# Patient Record
Sex: FEMALE | Race: WHITE | Employment: UNEMPLOYED | URBAN - METROPOLITAN AREA
[De-identification: names, ages, dates, MRNs, and addresses within clinical notes are randomized per-mention and may not be internally consistent; named-entity substitution may affect disease eponyms.]

---

## 2017-02-21 RX ORDER — ROPINIROLE 2 MG/1
TABLET, FILM COATED ORAL
Qty: 90 TABLET | Refills: 0 | Status: SHIPPED | OUTPATIENT
Start: 2017-02-21 | End: 2017-03-23 | Stop reason: SDUPTHER

## 2017-02-24 DIAGNOSIS — E11.9 TYPE 2 DIABETES MELLITUS WITHOUT COMPLICATION, WITHOUT LONG-TERM CURRENT USE OF INSULIN (HCC): Primary | ICD-10-CM

## 2017-03-22 ENCOUNTER — HOSPITAL ENCOUNTER (OUTPATIENT)
Age: 57
Setting detail: SPECIMEN
Discharge: HOME | End: 2017-03-22
Payer: COMMERCIAL

## 2017-03-22 DIAGNOSIS — E11.9 TYPE 2 DIABETES MELLITUS WITHOUT COMPLICATION, WITHOUT LONG-TERM CURRENT USE OF INSULIN (HCC): ICD-10-CM

## 2017-03-22 LAB
ESTIMATED AVERAGE GLUCOSE: 209 MG/DL
HBA1C MFR BLD: 8.9 % (ref 4–6)

## 2017-03-23 ENCOUNTER — OFFICE VISIT (OUTPATIENT)
Dept: FAMILY MEDICINE CLINIC | Age: 57
End: 2017-03-23
Payer: COMMERCIAL

## 2017-03-23 VITALS
HEART RATE: 97 BPM | DIASTOLIC BLOOD PRESSURE: 72 MMHG | HEIGHT: 63 IN | TEMPERATURE: 97.8 F | WEIGHT: 203.8 LBS | SYSTOLIC BLOOD PRESSURE: 125 MMHG | BODY MASS INDEX: 36.11 KG/M2

## 2017-03-23 DIAGNOSIS — E11.9 TYPE 2 DIABETES MELLITUS WITHOUT COMPLICATION, WITHOUT LONG-TERM CURRENT USE OF INSULIN (HCC): Primary | ICD-10-CM

## 2017-03-23 PROCEDURE — 99213 OFFICE O/P EST LOW 20 MIN: CPT | Performed by: FAMILY MEDICINE

## 2017-03-23 RX ORDER — GLYBURIDE 5 MG/1
10 TABLET ORAL 2 TIMES DAILY WITH MEALS
Qty: 90 TABLET | Refills: 3 | Status: SHIPPED | OUTPATIENT
Start: 2017-03-23 | End: 2017-07-17 | Stop reason: SDUPTHER

## 2017-03-23 RX ORDER — ASPIRIN AND DIPYRIDAMOLE 25; 200 MG/1; MG/1
1 CAPSULE, EXTENDED RELEASE ORAL 2 TIMES DAILY
Qty: 60 CAPSULE | Refills: 3 | Status: SHIPPED | OUTPATIENT
Start: 2017-03-23 | End: 2018-03-12 | Stop reason: SDUPTHER

## 2017-03-23 RX ORDER — ROPINIROLE 2 MG/1
TABLET, FILM COATED ORAL
Qty: 180 TABLET | Refills: 3 | Status: SHIPPED | OUTPATIENT
Start: 2017-03-23 | End: 2017-12-06 | Stop reason: SDUPTHER

## 2017-03-23 ASSESSMENT — ENCOUNTER SYMPTOMS
WHEEZING: 0
ABDOMINAL PAIN: 0
DIARRHEA: 0
SHORTNESS OF BREATH: 0
CONSTIPATION: 0

## 2017-03-31 ENCOUNTER — TELEPHONE (OUTPATIENT)
Dept: FAMILY MEDICINE CLINIC | Age: 57
End: 2017-03-31

## 2017-03-31 RX ORDER — BLOOD-GLUCOSE METER
1 KIT MISCELLANEOUS DAILY
Qty: 1 KIT | Refills: 0 | Status: SHIPPED | OUTPATIENT
Start: 2017-03-31 | End: 2018-08-21

## 2017-03-31 RX ORDER — LANCETS 28 GAUGE
1 EACH MISCELLANEOUS 2 TIMES DAILY
Qty: 100 EACH | Refills: 3 | Status: SHIPPED | OUTPATIENT
Start: 2017-03-31 | End: 2018-08-21

## 2017-05-20 ENCOUNTER — EMPLOYEE WELLNESS (OUTPATIENT)
Dept: OTHER | Age: 57
End: 2017-05-20

## 2017-05-20 LAB
CHOLESTEROL/HDL RATIO: 5.5
CHOLESTEROL: 266 MG/DL
GLUCOSE BLD-MCNC: 239 MG/DL (ref 70–99)
HDLC SERPL-MCNC: 48 MG/DL
LDL CHOLESTEROL DIRECT: 156 MG/DL
LDL CHOLESTEROL: ABNORMAL MG/DL (ref 0–130)
PATIENT FASTING?: YES
TRIGL SERPL-MCNC: 526 MG/DL
VLDLC SERPL CALC-MCNC: ABNORMAL MG/DL (ref 1–30)

## 2017-05-22 LAB
ESTIMATED AVERAGE GLUCOSE: 212 MG/DL
HBA1C MFR BLD: 9 % (ref 4–6)

## 2017-06-07 ENCOUNTER — TELEPHONE (OUTPATIENT)
Dept: FAMILY MEDICINE CLINIC | Age: 57
End: 2017-06-07

## 2017-06-07 RX ORDER — HYDROCHLOROTHIAZIDE 25 MG/1
25 TABLET ORAL DAILY
Qty: 90 TABLET | Refills: 3 | Status: SHIPPED | OUTPATIENT
Start: 2017-06-07 | End: 2018-03-29 | Stop reason: SDUPTHER

## 2017-06-20 ENCOUNTER — TELEPHONE (OUTPATIENT)
Dept: SURGERY | Age: 57
End: 2017-06-20

## 2017-07-17 ENCOUNTER — CARE COORDINATION (OUTPATIENT)
Dept: CARE COORDINATION | Age: 57
End: 2017-07-17

## 2017-07-17 DIAGNOSIS — E11.9 TYPE 2 DIABETES MELLITUS WITHOUT COMPLICATION, WITHOUT LONG-TERM CURRENT USE OF INSULIN (HCC): Primary | ICD-10-CM

## 2017-07-17 RX ORDER — GLYBURIDE 5 MG/1
TABLET ORAL
Qty: 90 TABLET | Refills: 0 | Status: SHIPPED | OUTPATIENT
Start: 2017-07-17 | End: 2017-07-17 | Stop reason: SDUPTHER

## 2017-07-18 RX ORDER — GLYBURIDE 5 MG/1
TABLET ORAL
Qty: 90 TABLET | Refills: 3 | Status: SHIPPED | OUTPATIENT
Start: 2017-07-18 | End: 2017-07-20 | Stop reason: DRUGHIGH

## 2017-07-20 ENCOUNTER — HOSPITAL ENCOUNTER (OUTPATIENT)
Age: 57
Setting detail: SPECIMEN
Discharge: HOME | End: 2017-07-20
Payer: COMMERCIAL

## 2017-07-20 ENCOUNTER — OFFICE VISIT (OUTPATIENT)
Dept: FAMILY MEDICINE CLINIC | Age: 57
End: 2017-07-20
Payer: COMMERCIAL

## 2017-07-20 VITALS
HEIGHT: 63 IN | TEMPERATURE: 98.6 F | BODY MASS INDEX: 37.6 KG/M2 | DIASTOLIC BLOOD PRESSURE: 82 MMHG | SYSTOLIC BLOOD PRESSURE: 118 MMHG | WEIGHT: 212.2 LBS

## 2017-07-20 DIAGNOSIS — E78.5 DYSLIPIDEMIA: ICD-10-CM

## 2017-07-20 DIAGNOSIS — E11.9 TYPE 2 DIABETES MELLITUS WITHOUT COMPLICATION, WITHOUT LONG-TERM CURRENT USE OF INSULIN (HCC): ICD-10-CM

## 2017-07-20 DIAGNOSIS — R53.83 FATIGUE, UNSPECIFIED TYPE: ICD-10-CM

## 2017-07-20 DIAGNOSIS — E11.9 TYPE 2 DIABETES MELLITUS WITHOUT COMPLICATION, WITHOUT LONG-TERM CURRENT USE OF INSULIN (HCC): Primary | ICD-10-CM

## 2017-07-20 LAB
ALBUMIN SERPL-MCNC: 4.4 G/DL (ref 3.5–5.2)
ALBUMIN/GLOBULIN RATIO: 1.4 (ref 1–2.5)
ALP BLD-CCNC: 97 U/L (ref 35–104)
ALT SERPL-CCNC: 59 U/L (ref 5–33)
ANION GAP SERPL CALCULATED.3IONS-SCNC: 17 MMOL/L (ref 9–17)
AST SERPL-CCNC: 33 U/L
BILIRUB SERPL-MCNC: 0.23 MG/DL (ref 0.3–1.2)
BUN BLDV-MCNC: 20 MG/DL (ref 6–20)
BUN/CREAT BLD: ABNORMAL (ref 9–20)
CALCIUM SERPL-MCNC: 9.8 MG/DL (ref 8.6–10.4)
CHLORIDE BLD-SCNC: 95 MMOL/L (ref 98–107)
CHOLESTEROL/HDL RATIO: 3.3
CHOLESTEROL: 202 MG/DL
CO2: 25 MMOL/L (ref 20–31)
CREAT SERPL-MCNC: 0.87 MG/DL (ref 0.5–0.9)
CREATININE URINE: 36.8 MG/DL (ref 28–217)
GFR AFRICAN AMERICAN: >60 ML/MIN
GFR NON-AFRICAN AMERICAN: >60 ML/MIN
GFR SERPL CREATININE-BSD FRML MDRD: ABNORMAL ML/MIN/{1.73_M2}
GFR SERPL CREATININE-BSD FRML MDRD: ABNORMAL ML/MIN/{1.73_M2}
GLUCOSE BLD-MCNC: 194 MG/DL (ref 70–99)
HBA1C MFR BLD: 9.6 %
HDLC SERPL-MCNC: 62 MG/DL
LDL CHOLESTEROL: 90 MG/DL (ref 0–130)
MICROALBUMIN/CREAT 24H UR: <12 MG/L
MICROALBUMIN/CREAT UR-RTO: 33 MCG/MG CREAT
POTASSIUM SERPL-SCNC: 4.4 MMOL/L (ref 3.7–5.3)
SODIUM BLD-SCNC: 137 MMOL/L (ref 135–144)
TOTAL PROTEIN: 7.5 G/DL (ref 6.4–8.3)
TRIGL SERPL-MCNC: 251 MG/DL
VLDLC SERPL CALC-MCNC: ABNORMAL MG/DL (ref 1–30)

## 2017-07-20 PROCEDURE — 99214 OFFICE O/P EST MOD 30 MIN: CPT | Performed by: FAMILY MEDICINE

## 2017-07-20 PROCEDURE — 83036 HEMOGLOBIN GLYCOSYLATED A1C: CPT | Performed by: FAMILY MEDICINE

## 2017-07-20 RX ORDER — GLYBURIDE 5 MG/1
TABLET ORAL
Qty: 90 TABLET | Refills: 3 | Status: SHIPPED | OUTPATIENT
Start: 2017-07-20 | End: 2017-11-15 | Stop reason: SDUPTHER

## 2017-07-20 RX ORDER — LISINOPRIL 20 MG/1
TABLET ORAL
Qty: 90 TABLET | Refills: 3 | Status: SHIPPED | OUTPATIENT
Start: 2017-07-20 | End: 2018-07-09 | Stop reason: SDUPTHER

## 2017-07-20 RX ORDER — OMEPRAZOLE 20 MG/1
20 CAPSULE, DELAYED RELEASE ORAL 2 TIMES DAILY
Qty: 180 CAPSULE | Refills: 3 | Status: SHIPPED | OUTPATIENT
Start: 2017-07-20 | End: 2018-02-06 | Stop reason: SDUPTHER

## 2017-07-20 ASSESSMENT — ENCOUNTER SYMPTOMS
SHORTNESS OF BREATH: 0
CONSTIPATION: 0
DIARRHEA: 0
WHEEZING: 0
CHEST TIGHTNESS: 0

## 2017-07-20 ASSESSMENT — PATIENT HEALTH QUESTIONNAIRE - PHQ9
1. LITTLE INTEREST OR PLEASURE IN DOING THINGS: 0
SUM OF ALL RESPONSES TO PHQ QUESTIONS 1-9: 0
SUM OF ALL RESPONSES TO PHQ9 QUESTIONS 1 & 2: 0
2. FEELING DOWN, DEPRESSED OR HOPELESS: 0

## 2017-07-22 LAB — VITAMIN D 1,25-DIHYDROXY: 25.9 PG/ML (ref 19.9–79.3)

## 2017-08-01 DIAGNOSIS — E11.9 TYPE 2 DIABETES MELLITUS WITHOUT COMPLICATION, WITHOUT LONG-TERM CURRENT USE OF INSULIN (HCC): Primary | ICD-10-CM

## 2017-08-03 ENCOUNTER — CARE COORDINATION (OUTPATIENT)
Dept: CARE COORDINATION | Age: 57
End: 2017-08-03

## 2017-08-07 ENCOUNTER — CARE COORDINATION (OUTPATIENT)
Dept: CARE COORDINATION | Age: 57
End: 2017-08-07

## 2017-08-14 ENCOUNTER — HOSPITAL ENCOUNTER (OUTPATIENT)
Dept: MRI IMAGING | Age: 57
Discharge: HOME | End: 2017-08-14
Payer: COMMERCIAL

## 2017-08-14 DIAGNOSIS — M79.609 PAIN IN EXTREMITY, UNSPECIFIED EXTREMITY: ICD-10-CM

## 2017-08-14 PROCEDURE — 73721 MRI JNT OF LWR EXTRE W/O DYE: CPT

## 2017-08-21 ENCOUNTER — HOSPITAL ENCOUNTER (OUTPATIENT)
Dept: DIABETES SERVICES | Age: 57
Setting detail: THERAPIES SERIES
Discharge: HOME | End: 2017-08-21
Payer: COMMERCIAL

## 2017-08-21 VITALS
DIASTOLIC BLOOD PRESSURE: 74 MMHG | WEIGHT: 215.39 LBS | HEIGHT: 63 IN | SYSTOLIC BLOOD PRESSURE: 133 MMHG | HEART RATE: 80 BPM | BODY MASS INDEX: 38.16 KG/M2

## 2017-08-21 PROCEDURE — G0108 DIAB MANAGE TRN  PER INDIV: HCPCS

## 2017-08-31 ENCOUNTER — HOSPITAL ENCOUNTER (OUTPATIENT)
Dept: DIABETES SERVICES | Age: 57
Setting detail: THERAPIES SERIES
Discharge: HOME | End: 2017-08-31
Payer: COMMERCIAL

## 2017-08-31 PROCEDURE — G0108 DIAB MANAGE TRN  PER INDIV: HCPCS

## 2017-09-07 ENCOUNTER — CARE COORDINATION (OUTPATIENT)
Dept: CARE COORDINATION | Age: 57
End: 2017-09-07

## 2017-09-08 ENCOUNTER — HOSPITAL ENCOUNTER (OUTPATIENT)
Dept: DIABETES SERVICES | Age: 57
Setting detail: THERAPIES SERIES
Discharge: HOME | End: 2017-09-08
Payer: COMMERCIAL

## 2017-09-08 PROCEDURE — G0108 DIAB MANAGE TRN  PER INDIV: HCPCS

## 2017-09-14 RX ORDER — DAPAGLIFLOZIN 10 MG/1
TABLET, FILM COATED ORAL
Qty: 30 TABLET | Refills: 0 | Status: SHIPPED | OUTPATIENT
Start: 2017-09-14 | End: 2017-10-17 | Stop reason: SDUPTHER

## 2017-09-26 ENCOUNTER — CARE COORDINATION (OUTPATIENT)
Dept: CARE COORDINATION | Age: 57
End: 2017-09-26

## 2017-10-16 ENCOUNTER — CARE COORDINATION (OUTPATIENT)
Dept: CARE COORDINATION | Age: 57
End: 2017-10-16

## 2017-10-17 RX ORDER — DAPAGLIFLOZIN 10 MG/1
TABLET, FILM COATED ORAL
Qty: 30 TABLET | Refills: 0 | Status: SHIPPED | OUTPATIENT
Start: 2017-10-17 | End: 2017-10-26 | Stop reason: ALTCHOICE

## 2017-10-26 ENCOUNTER — CARE COORDINATION (OUTPATIENT)
Dept: CARE COORDINATION | Age: 57
End: 2017-10-26

## 2017-10-26 ENCOUNTER — OFFICE VISIT (OUTPATIENT)
Dept: FAMILY MEDICINE CLINIC | Age: 57
End: 2017-10-26
Payer: COMMERCIAL

## 2017-10-26 VITALS
WEIGHT: 208.6 LBS | HEART RATE: 77 BPM | BODY MASS INDEX: 36.96 KG/M2 | SYSTOLIC BLOOD PRESSURE: 124 MMHG | HEIGHT: 63 IN | DIASTOLIC BLOOD PRESSURE: 72 MMHG | TEMPERATURE: 97.6 F

## 2017-10-26 DIAGNOSIS — E11.9 TYPE 2 DIABETES MELLITUS WITHOUT COMPLICATION, WITHOUT LONG-TERM CURRENT USE OF INSULIN (HCC): Primary | ICD-10-CM

## 2017-10-26 DIAGNOSIS — R79.89 ABNORMAL LFTS: ICD-10-CM

## 2017-10-26 LAB — HBA1C MFR BLD: 9.6 %

## 2017-10-26 PROCEDURE — 99213 OFFICE O/P EST LOW 20 MIN: CPT | Performed by: FAMILY MEDICINE

## 2017-10-26 PROCEDURE — 83036 HEMOGLOBIN GLYCOSYLATED A1C: CPT | Performed by: FAMILY MEDICINE

## 2017-10-26 NOTE — PROGRESS NOTES
Visit Information    Have you changed or started any medications since your last visit including any over-the-counter medicines, vitamins, or herbal medicines? no   Have you stopped taking any of your medications? Is so, why? -  no  Are you having any side effects from any of your medications? - no    Have you seen any other physician or provider since your last visit? Yes- ortho   Have you had any other diagnostic tests since your last visit? yes - MRI, X-RAY    Have you been seen in the emergency room and/or had an admission in a hospital since we last saw you?  no   Have you had your routine dental cleaning in the past 6 months?  yes      Do you have an active MyChart account? If no, what is the barrier?   YES     Patient Care Team:  Tasneem Rudolph MD as PCP - General (Family Medicine)  Tasneem Rudolph MD as PCP - S Attributed Provider  Antony Nichols MD as Consulting Physician (Gastroenterology)  Joel Morales as Care Coordinator    Medical History Review  Past Medical, Family, and Social History reviewed and does contribute to the patient presenting condition    Health Maintenance   Topic Date Due    Diabetic retinal exam  01/12/2017    Cervical cancer screen  08/19/2017    Diabetic hemoglobin A1C test  08/20/2017    Flu vaccine (1) 09/01/2017    Diabetic foot exam  12/01/2017    Diabetic microalbuminuria test  07/20/2018    Lipid screen  07/20/2018    Breast cancer screen  12/23/2018    DTaP/Tdap/Td vaccine (2 - Td) 12/15/2020    Colon cancer screen colonoscopy  09/17/2022    Pneumococcal med risk  Completed    Hepatitis C screen  Completed    HIV screen  Completed
and thyroid not enlarged, symmetric, no tenderness/mass/nodules  Heart: regular rate and rhythm, S1, S2 normal, no murmur, click, rub or gallop  Abdomen: soft, non-tender; bowel sounds normal; no masses,  no organomegaly  Extremities: extremities normal, atraumatic, no cyanosis or edema and R leg in boot. Lab Review  Lab Results   Component Value Date    CHOL 202 07/20/2017    CHOL 266 05/20/2017    CHOL 168 12/07/2016    HDL 62 07/20/2017    HDL 48 05/20/2017    HDL 52 12/07/2016    LDLDIRECT 156 05/20/2017         Assessment:      Dyslipidemia under elecated TG control. Hypertension, stage 2 well controlled. Evidence of target organ damage: none. Diabetes mellitus Type II, under poor control. Plan:      1. Continue dietary measures. 2. Continue regular exercise. 3. Lipid-lowering medications: Crestor 10  4. Discussed general issues about diabetes pathophysiology and management. Reminded to bring in blood sugar diary at next visit. Follow up in 6 weeks or as needed. 5. Follow up in 6 weeks.   Flu shot on Tuesday  A1c- 12.4  DC farxiga and start Invokana            HPI    Review of Systems    Physical Exam

## 2017-10-31 ENCOUNTER — NURSE ONLY (OUTPATIENT)
Dept: FAMILY MEDICINE CLINIC | Age: 57
End: 2017-10-31
Payer: COMMERCIAL

## 2017-10-31 DIAGNOSIS — Z23 FLU VACCINE NEED: Primary | ICD-10-CM

## 2017-10-31 PROCEDURE — 90688 IIV4 VACCINE SPLT 0.5 ML IM: CPT | Performed by: FAMILY MEDICINE

## 2017-10-31 PROCEDURE — 90471 IMMUNIZATION ADMIN: CPT | Performed by: FAMILY MEDICINE

## 2017-11-10 ENCOUNTER — CARE COORDINATION (OUTPATIENT)
Dept: CARE COORDINATION | Age: 57
End: 2017-11-10

## 2017-11-10 NOTE — CARE COORDINATION
Ambulatory Care Coordination Note  11/10/2017  CM Risk Score: 2  Naomi Mortality Risk Score:      ACC: Cyndi Rodriguez    Summary Note: Margarita Valenzuela is home on a medical leave. She states she does not have the paperwork to fill out and obtain a glucometer at home. She did take Count includes the [facility] email to request him to send paperwork to her so she can fill it out and obtain a glucometer. She currently is not checking her blood sugars. Diabetes Assessment    Medic Alert ID:  No  Meal Planning:  Avoidance of concentrated sweets, Other   How often do you test your blood sugar?:  No Testing   Do you have barriers with adherence to non-pharmacologic self-management interventions? (Nutrition/Exercise/Self-Monitoring):  Yes   Have you ever had to go to the ED for symptoms of low blood sugar?:  No                  Care Coordination Interventions    Program Enrollment:  Rising Risk  Referral from Primary Care Provider:  No  Suggested Interventions and Community Resources  Diabetes Education:  Completed         Goals Addressed     None          Prior to Admission medications    Medication Sig Start Date End Date Taking?  Authorizing Provider   canagliflozin (INVOKANA) 100 MG TABS tablet Take 1 tablet by mouth every morning (before breakfast) 10/26/17   Lauryn Chandler MD   lisinopril (PRINIVIL;ZESTRIL) 20 MG tablet Take 1tabs daily 7/20/17   Lauryn Chandler MD   omeprazole (PRILOSEC) 20 MG delayed release capsule Take 1 capsule by mouth 2 times daily 7/20/17   Lauryn Chandler MD   glyBURIDE (DIABETA) 5 MG tablet TAKE 2 TABLETS BY MOUTH TWICE DAILY WITH MEALS 7/20/17   Lauryn Chandler MD   hydrochlorothiazide (HYDRODIURIL) 25 MG tablet Take 1 tablet by mouth daily 6/7/17   Lauryn Chandler MD   glucose monitoring kit (FREESTYLE) monitoring kit 1 kit by Does not apply route daily 3/31/17   Lauryn Chandler MD   FREESTYLE LANCETS MISC 1 each by Does not apply route 2 times daily 3/31/17   Lauryn Chandler MD   glucose blood VI test strips

## 2017-11-15 RX ORDER — GLYBURIDE 5 MG/1
TABLET ORAL
Qty: 90 TABLET | Refills: 3 | Status: SHIPPED | OUTPATIENT
Start: 2017-11-15 | End: 2018-02-14 | Stop reason: SDUPTHER

## 2017-12-04 ENCOUNTER — CARE COORDINATION (OUTPATIENT)
Dept: CARE COORDINATION | Age: 57
End: 2017-12-04

## 2017-12-04 NOTE — CARE COORDINATION
Ambulatory Care Coordination Note  12/4/2017  CM Risk Score: 2  Naomi Mortality Risk Score:      ACC: Andria Meter    Summary Note: Nayeli Mendoza reports she does not have the necessary paper work to obtain the glucometer. She has not reached out to Kal Osborn. She today states she thinks she has it in a bag that she carries back and forth to work. She states she will get the bag out of her car and look for the papers. She agreed to keep CC informed. Care Coordination Interventions    Program Enrollment:  Rising Risk  Referral from Primary Care Provider:  No  Suggested Interventions and Community Resources  Diabetes Education:  Completed         Goals Addressed     None          Prior to Admission medications    Medication Sig Start Date End Date Taking? Authorizing Provider   glyBURIDE (DIABETA) 5 MG tablet TAKE 2 TABLETS BY MOUTH TWICE DAILY WITH MEALS 11/15/17   Aly Donaldson MD   canagliflozin [facility]) 100 MG TABS tablet Take 1 tablet by mouth every morning (before breakfast) 10/26/17   Aly Donaldson MD   lisinopril (PRINIVIL;ZESTRIL) 20 MG tablet Take 1tabs daily 7/20/17   Aly Donaldson MD   omeprazole (PRILOSEC) 20 MG delayed release capsule Take 1 capsule by mouth 2 times daily 7/20/17   Aly Donaldson MD   hydrochlorothiazide (HYDRODIURIL) 25 MG tablet Take 1 tablet by mouth daily 6/7/17   Aly Donaldson MD   glucose monitoring kit (FREESTYLE) monitoring kit 1 kit by Does not apply route daily 3/31/17   Aly Donaldson MD   FREESTYLE LANCETS MISC 1 each by Does not apply route 2 times daily 3/31/17   Aly Donaldson MD   glucose blood VI test strips (FREESTYLE TEST STRIPS) strip 1 each by In Vitro route daily As needed. 3/31/17   Aly Donaldson MD   dipyridamole-aspirin (AGGRENOX)  MG per extended release capsule Take 1 capsule by mouth 2 times daily 3/23/17   Aly Donaldson MD   rOPINIRole (REQUIP) 2 MG tablet TAKE (2) TABLETS BY MOUTH ONCE DAILY AT NIGHT.  3/23/17   Aly Donaldson MD   metFORMIN (GLUCOPHAGE) 1000 MG tablet Take 1 tablet by mouth 2 times daily (with meals) 10/12/16   Josefina Bird MD   rosuvastatin (CRESTOR) 10 MG tablet Take 1 tablet by mouth daily 10/12/16   Josefina Bird MD   loratadine (CLARITIN) 10 MG tablet Take 1 tablet by mouth daily 2/23/16   Josefina Bird MD   meloxicam [facility]) 15 MG tablet Take 1 tablet by mouth daily 1/19/16   Josefina Bird MD   Calcium Carbonate-Vitamin D (OYSTER SHELL CALCIUM/D) 500-200 MG-UNIT TABS Take 2 tablets by mouth daily 1/19/16 10/26/17  Josefina Bird MD   glucose monitoring kit (FREESTYLE) monitoring kit 1 kit by Does not apply route daily as needed 6/9/15   Arleth James MD       Future Appointments  Date Time Provider Nicola Zaidi   12/20/2017 5:00 PM Josefina Bird MD [address] [facility]

## 2017-12-06 RX ORDER — LANOLIN ALCOHOL/MO/W.PET/CERES
3 CREAM (GRAM) TOPICAL NIGHTLY PRN
Qty: 30 TABLET | Refills: 1 | Status: SHIPPED | OUTPATIENT
Start: 2017-12-06 | End: 2018-02-13 | Stop reason: ALTCHOICE

## 2017-12-06 RX ORDER — ROPINIROLE 2 MG/1
TABLET, FILM COATED ORAL
Qty: 180 TABLET | Refills: 3 | Status: SHIPPED | OUTPATIENT
Start: 2017-12-06 | End: 2018-11-12 | Stop reason: SDUPTHER

## 2017-12-06 RX ORDER — BUSPIRONE HYDROCHLORIDE 10 MG/1
10 TABLET ORAL 2 TIMES DAILY
Qty: 60 TABLET | Refills: 1 | Status: SHIPPED | OUTPATIENT
Start: 2017-12-06 | End: 2018-02-06 | Stop reason: SDUPTHER

## 2018-01-17 ENCOUNTER — CARE COORDINATION (OUTPATIENT)
Dept: CARE COORDINATION | Age: 58
End: 2018-01-17

## 2018-02-05 ENCOUNTER — OFFICE VISIT (OUTPATIENT)
Dept: FAMILY MEDICINE CLINIC | Age: 58
End: 2018-02-05
Payer: COMMERCIAL

## 2018-02-05 VITALS
TEMPERATURE: 98.3 F | HEART RATE: 82 BPM | DIASTOLIC BLOOD PRESSURE: 80 MMHG | SYSTOLIC BLOOD PRESSURE: 110 MMHG | WEIGHT: 213.8 LBS | BODY MASS INDEX: 37.88 KG/M2

## 2018-02-05 DIAGNOSIS — B34.9 ACUTE VIRAL SYNDROME: Primary | ICD-10-CM

## 2018-02-05 PROCEDURE — 99213 OFFICE O/P EST LOW 20 MIN: CPT | Performed by: FAMILY MEDICINE

## 2018-02-05 ASSESSMENT — ENCOUNTER SYMPTOMS
VOMITING: 0
COUGH: 0
ABDOMINAL PAIN: 0
FLU SYMPTOMS: 1
NAUSEA: 0

## 2018-02-05 NOTE — PATIENT INSTRUCTIONS
Visit Information    Have you changed or started any medications since your last visit including any over-the-counter medicines, vitamins, or herbal medicines? no   Have you stopped taking any of your medications? Is so, why? -  no  Are you having any side effects from any of your medications? - no    Have you seen any other physician or provider since your last visit?  no   Have you had any other diagnostic tests since your last visit?  no   Have you been seen in the emergency room and/or had an admission in a hospital since we last saw you?  no   Have you had your routine dental cleaning in the past 6 months?  no     Do you have an active MyChart account? If no, what is the barrier? No:     Patient Care Team:  Linda Jones MD as PCP - General (Family Medicine)  Linda Jones MD as PCP - S Attributed Provider  Mirian Munguia MD as Consulting Physician (Gastroenterology)  Charly Mccarthy RN as Care Coordinator    Medical History Review  Past Medical, Family, and Social History reviewed and does not contribute to the patient presenting condition    Health Maintenance   Topic Date Due    Diabetic retinal exam  01/12/2017    Cervical cancer screen  08/19/2017    Diabetic foot exam  12/01/2017    A1C test (Diabetic or Prediabetic)  01/26/2018    Diabetic microalbuminuria test  07/20/2018    Lipid screen  07/20/2018    Potassium monitoring  07/20/2018    Creatinine monitoring  07/20/2018    Breast cancer screen  12/23/2018    DTaP/Tdap/Td vaccine (2 - Td) 12/15/2020    Colon cancer screen colonoscopy  09/17/2022    Flu vaccine  Completed    Pneumococcal med risk  Completed    Hepatitis C screen  Completed    HIV screen  Completed   Thank you for letting us take care of you today. We hope all your questions were addressed. If a question was overlooked or something else comes to mind after you return home, please contact a member of your Care Team listed below.     Please make sure you have a

## 2018-02-07 NOTE — TELEPHONE ENCOUNTER
dysfunction     Chronic serous otitis media     Nasal polyps     Nasal congestion     Osteoarthritis of left knee     Osteoarthritis of right knee     DIEGO (nonalcoholic steatohepatitis)     Vitamin D deficiency     Iron deficiency anemia     Dyslipidemia     Diabetes mellitus type 2, uncontrolled (HCC)     Left hip pain     Trochanteric bursitis     Fatigue     Daytime somnolence     Snoring

## 2018-02-08 RX ORDER — BUSPIRONE HYDROCHLORIDE 10 MG/1
TABLET ORAL
Qty: 60 TABLET | Refills: 1 | Status: SHIPPED | OUTPATIENT
Start: 2018-02-08 | End: 2018-04-08 | Stop reason: SDUPTHER

## 2018-02-08 RX ORDER — OMEPRAZOLE 20 MG/1
CAPSULE, DELAYED RELEASE ORAL
Qty: 180 CAPSULE | Refills: 3 | Status: SHIPPED | OUTPATIENT
Start: 2018-02-08 | End: 2019-06-18 | Stop reason: ALTCHOICE

## 2018-02-13 ENCOUNTER — OFFICE VISIT (OUTPATIENT)
Dept: FAMILY MEDICINE CLINIC | Age: 58
End: 2018-02-13
Payer: COMMERCIAL

## 2018-02-13 VITALS
HEIGHT: 63 IN | DIASTOLIC BLOOD PRESSURE: 78 MMHG | OXYGEN SATURATION: 99 % | TEMPERATURE: 98.6 F | SYSTOLIC BLOOD PRESSURE: 134 MMHG | WEIGHT: 215.6 LBS | HEART RATE: 72 BPM | BODY MASS INDEX: 38.2 KG/M2

## 2018-02-13 DIAGNOSIS — I10 ESSENTIAL HYPERTENSION: ICD-10-CM

## 2018-02-13 DIAGNOSIS — E78.5 DYSLIPIDEMIA: ICD-10-CM

## 2018-02-13 DIAGNOSIS — E11.9 TYPE 2 DIABETES MELLITUS WITHOUT COMPLICATION, WITHOUT LONG-TERM CURRENT USE OF INSULIN (HCC): Primary | ICD-10-CM

## 2018-02-13 DIAGNOSIS — M89.8X9 BONE PAIN: ICD-10-CM

## 2018-02-13 LAB — HBA1C MFR BLD: 9.4 %

## 2018-02-13 PROCEDURE — 99214 OFFICE O/P EST MOD 30 MIN: CPT | Performed by: FAMILY MEDICINE

## 2018-02-13 PROCEDURE — 83036 HEMOGLOBIN GLYCOSYLATED A1C: CPT | Performed by: FAMILY MEDICINE

## 2018-02-13 RX ORDER — B-COMPLEX WITH VITAMIN C
2 TABLET ORAL DAILY
Qty: 180 TABLET | Refills: 5 | Status: SHIPPED | OUTPATIENT
Start: 2018-02-13 | End: 2019-07-01

## 2018-02-13 NOTE — LETTER
[address]  [address] [place] [address]  Phone: [phone]  Fax: [phone]    Isidro Orellana MD        February 13, 2018     Patient: Danette Johnston   YOB: 1960   Date of Visit: 2/13/2018       To Whom it May Concern:    Danette Johnston was seen in my clinic on 2/13/2018. It is in my medical opinion she be excused from jury duty due to severe restless leg syndrome. If you have any questions or concerns, please don't hesitate to call.     Sincerely,         Isidro Orellana MD

## 2018-02-13 NOTE — PROGRESS NOTES
Subjective:      Indu Denton is here for follow up of elevated cholesterol, elevated blood pressure, and diabetes. Compliance with treatment has been fair. Patient denies muscle pain associated with her medications. She is not exercising and is not adherent to a low-salt diet. Blood pressure is well controlled at home. Cardiac symptoms: none. Patient denies: chest pain, chest pressure/discomfort, dyspnea, fatigue and syncope. Cardiovascular risk factors: diabetes mellitus, dyslipidemia, hypertension, obesity (BMI >= 30 kg/m2) and sedentary lifestyle. Use of agents associated with hypertension: none. History of target organ damage: none. Current diabetic symptoms include: none. Patient denies foot ulcerations, nausea, polydipsia, polyuria and visual disturbances. Evaluation to date has included: hemoglobin A1C and microalbuminuria. Home sugars: patient does not check sugars. Current treatments: . Last dilated eye exam 1 year ago. Patient's medications, allergies, past medical, surgical, social and family histories were reviewed and updated as appropriate. Review of Systems  Constitutional: negative  Respiratory: negative  Cardiovascular: negative  Gastrointestinal: negative  Genitourinary:negative    Patient is in a boot  Objective:      /78 (Site: Left Arm, Position: Sitting, Cuff Size: Medium Adult)   Pulse 72   Temp 98.6 °F (37 °C) (Temporal)   Ht 5' 2.99\" (1.6 m)   Wt 215 lb 9.6 oz (97.8 kg)   SpO2 99%   BMI 38.20 kg/m²   Lungs: clear to auscultation bilaterally  Heart: regular rate and rhythm, S1, S2 normal, no murmur, click, rub or gallop  Abdomen: soft, non-tender; bowel sounds normal; no masses,  no organomegaly  Extremities: extremities normal, atraumatic, no cyanosis or edema and wearing a boot R leg.     Lab Review  Lab Results   Component Value Date    CHOL 202 07/20/2017    CHOL 266 05/20/2017    CHOL 168 12/07/2016    HDL 62 07/20/2017    HDL 48 05/20/2017    HDL 52 12/07/2016 LDLDIRECT 156 05/20/2017         Assessment:      Dyslipidemia under good control. Hypertension, normal blood pressure . Evidence of target organ damage: none. Diabetes mellitus Type II, under poor control. Plan:      1. Continue dietary measures. 2. Continue regular exercise. 3. Lipid-lowering medications: Atorvastatin 10mg QHS  4. Encouraged aerobic exercise. Discussed foot care. Reminded to get yearly retinal exam.  Labs: hemoglobin A1C and microalbuminuria. Invokana increased to 200 mg daily  5. Follow up in 3 months.   6. Labs ordered          HPI    Review of Systems    Physical Exam

## 2018-02-13 NOTE — PROGRESS NOTES
DIABETES and HYPERTENSION visit    BP Readings from Last 3 Encounters:   02/13/18 134/78   02/05/18 110/80   10/26/17 124/72        Hemoglobin A1C (%)   Date Value   10/26/2017 9.6   07/20/2017 9.6   05/20/2017 9.0 (H)     Microalb/Crt. Ratio (mcg/mg creat)   Date Value   07/20/2017 33 (H)     LDL Cholesterol (mg/dL)   Date Value   07/20/2017 90     HDL (mg/dL)   Date Value   07/20/2017 62     BUN (mg/dL)   Date Value   07/20/2017 20     CREATININE (mg/dL)   Date Value   07/20/2017 0.87     Glucose (mg/dL)   Date Value   07/20/2017 194 (H)   05/19/2012 137 (H)            Have you changed or started any medications since your last visit including any over-the-counter medicines, vitamins, or herbal medicines? no   Have you stopped taking any of your medications? Is so, why? -  no  Are you having any side effects from any of your medications? - no    Have you seen any other physician or provider since your last visit?  no   Have you had any other diagnostic tests since your last visit?  no   Have you been seen in the emergency room and/or had an admission in a hospital since we last saw you?  no   Have you had your routine dental cleaning in the past 6 months?  no     Have you had your annual diabetic retinal (eye) exam? Yes   (ensure copy of exam is in the chart)    Do you have an active MyChart account? If no, what is the barrier?   Yes    Patient Care Team:  Cassandra Strauss MD as PCP - General (Family Medicine)  Cassandra Strauss MD as PCP - S Attributed Provider  Treva Heimlich, MD as Consulting Physician (Gastroenterology)  Wanda De La Rosa RN as Care Coordinator    Medical History Review  Past Medical, Family, and Social History reviewed and does contribute to the patient presenting condition    Health Maintenance   Topic Date Due    Diabetic retinal exam  01/12/2017    Cervical cancer screen  08/19/2017    Diabetic foot exam  12/01/2017    A1C test (Diabetic or Prediabetic)  01/26/2018    Diabetic

## 2018-02-13 NOTE — PATIENT INSTRUCTIONS
Thank you for letting us take care of you today. We hope all your questions were addressed. If a question was overlooked or something else comes to mind after you return home, please contact a member of your Care Team listed below. Please make sure you have a routine office visit set up to follow-up on [address]. Your Care Team at [place] [address] is Team #1  Georges Mandujano MD (Faculty)  Alek Muller MD (Faculty  Gracielacody Meza MD (Resident)  Azalia Juarez MD (Resident)  Karina Charlton MD (Resident)  Preeti Rodriguez MD (Resident)  Paulo Brown MD (Resident)  Nancy Cerna [facility]  Kindra Mensah CODY CASTELAN, [address] [facility], ([facility])  SHERRY Burns, ([address] [facility])  Brent Avalos, Ph.D., ([address])  Jamari Snow, [address] (Clinical Pharmacist)     Office phone number: [phone]    If you need to get in right away due to illness, please be advised we have \"Same Day\" appointments available Monday-Friday. Please call us at [phone] option #1 to schedule your \"Same Day\" appointment.

## 2018-02-14 ENCOUNTER — TELEPHONE (OUTPATIENT)
Dept: FAMILY MEDICINE CLINIC | Age: 58
End: 2018-02-14

## 2018-02-14 RX ORDER — GLYBURIDE 5 MG/1
TABLET ORAL
Qty: 90 TABLET | Refills: 3 | Status: SHIPPED | OUTPATIENT
Start: 2018-02-14 | End: 2018-05-24 | Stop reason: SDUPTHER

## 2018-02-14 NOTE — TELEPHONE ENCOUNTER
Pt called asking for a refill of Glyburide 10 mg 1 tab 2x's daily. Please send to [facility]      Next Visit Date:  Future Appointments  Date Time Provider Nicola Zaidi   2/15/2018 5:15 PM Chepe Telles, PT G[place]   2/26/2018 5:15  [facility] [place] [place] DIGITAL RM [address] [facility] [place] [place] Radiolog   5/15/2018 8:15 AM Nia Welch MD [address] [place] [address] Maintenance   Topic Date Due    Diabetic retinal exam  01/12/2017    Cervical cancer screen  08/19/2017    Diabetic foot exam  12/01/2017    A1C test (Diabetic or Prediabetic)  05/13/2018    Diabetic microalbuminuria test  07/20/2018    Lipid screen  07/20/2018    Potassium monitoring  07/20/2018    Creatinine monitoring  07/20/2018    Breast cancer screen  12/23/2018    DTaP/Tdap/Td vaccine (2 - Td) 12/15/2020    Colon cancer screen colonoscopy  09/17/2022    Flu vaccine  Completed    Pneumococcal med risk  Completed    Hepatitis C screen  Completed    HIV screen  Completed       Hemoglobin A1C (%)   Date Value   02/13/2018 9.4   10/26/2017 9.6   07/20/2017 9.6             ( goal A1C is < 7)   Microalb/Crt.  Ratio (mcg/mg creat)   Date Value   07/20/2017 33 (H)     LDL Cholesterol (mg/dL)   Date Value   07/20/2017 90       (goal LDL is <100)   AST (U/L)   Date Value   07/20/2017 33 (H)     ALT (U/L)   Date Value   07/20/2017 59 (H)     BUN (mg/dL)   Date Value   07/20/2017 20     BP Readings from Last 3 Encounters:   02/13/18 134/78   02/05/18 110/80   10/26/17 124/72          (goal 120/80)    All Future Testing planned in [facility]  Lab Frequency Next Occurrence   ALT Once 03/08/2018   Comprehensive Metabolic Panel, Fasting Once 02/13/2018   Vitamin D 1,25 Dihydroxy Once 02/13/2018               Patient Active Problem List:     Cervical spondylosis     Type II or unspecified type diabetes mellitus without mention of complication, not stated as uncontrolled     Hypertension     Abnormal auditory perception     Eustachian tube

## 2018-02-15 ENCOUNTER — HOSPITAL ENCOUNTER (OUTPATIENT)
Dept: PHYSICAL THERAPY | Age: 58
Setting detail: THERAPIES SERIES
Discharge: HOME | End: 2018-02-15
Payer: COMMERCIAL

## 2018-02-15 PROCEDURE — 97162 PT EVAL MOD COMPLEX 30 MIN: CPT

## 2018-02-15 PROCEDURE — 97110 THERAPEUTIC EXERCISES: CPT

## 2018-02-15 ASSESSMENT — PAIN DESCRIPTION - ORIENTATION: ORIENTATION: RIGHT;DISTAL

## 2018-02-15 ASSESSMENT — PAIN DESCRIPTION - LOCATION: LOCATION: ANKLE;FOOT

## 2018-02-15 ASSESSMENT — PAIN DESCRIPTION - ONSET: ONSET: PROGRESSIVE

## 2018-02-15 ASSESSMENT — PAIN SCALES - GENERAL: PAINLEVEL_OUTOF10: 5

## 2018-02-15 ASSESSMENT — PAIN DESCRIPTION - FREQUENCY: FREQUENCY: CONTINUOUS

## 2018-02-15 ASSESSMENT — PAIN DESCRIPTION - PROGRESSION: CLINICAL_PROGRESSION: GRADUALLY WORSENING

## 2018-02-15 ASSESSMENT — PAIN DESCRIPTION - PAIN TYPE: TYPE: CHRONIC PAIN

## 2018-02-15 NOTE — PROGRESS NOTES
Within Functional Limits  Hearing  Hearing: Within functional limits    Orientation  Orientation  Overall Orientation Status: Within Normal Limits    Social/Functional History  Social/Functional History  Lives With: Spouse  Type of Home: Apartment  Home Layout: One level  Bathroom Shower/Tub: Tub/Shower unit;Curtain  Bathroom Toilet: Standard  Bathroom Accessibility: Accessible  ADL Assistance: Independent  Homemaking Assistance: Independent  Homemaking Responsibilities: Yes  Ambulation Assistance: Independent  Transfer Assistance: Independent  Active : Yes  Mode of Transportation: Car  Occupation: Full time employment  Type of occupation: Office   Leisure & Hobbies: read, walk, crafts   Objective  IADL History  Homemaking Responsibilities: Yes  Observation/Palpation  Posture: Poor  Palpation: Tenderness on both side of right posterior foot/ankle. AROM RLE (degrees)  RLE AROM: Exceptions  R Ankle Dorsiflexion 0-20: 0-10  R Ankle Plantar Flexion 0-45: 0-25  R Ankle Forefoot Inversion 0-40: 0-20  R Ankle Forefoot Eversion 0-20: 0-20    Strength RLE  Strength RLE: Exception  Comment: painful moments   R Ankle Dorsiflexion: 2/5  R Ankle Plantar flexion: 2/5  R Ankle Inversion: 2/5  R Ankle Eversion: 2/5  R Great Toe Extension: 2/5  R Great Toe Flexion: 2/5        Sensation  Overall Sensation Status: WNL  Bed mobility  Bridging: Independent  Transfers  Sit to Stand: Independent  Ambulation  Ambulation?: Yes  WB Status: WBAT RLE  Ambulation 1  Surface: level tile  Device: No Device  Assistance: Independent  Quality of Gait: Fair-poor:  foot plant to toe off is limited due to ankle/foot pain, LE abducted and ER at stance phase. Distance: limited to 1/4 mile or less   Stairs/Curb  Stairs?: Yes  Stairs  # Steps : 10  Stairs Height: 8\"  Rails: Right ascending  Assistance: Independent  Comment: up and down steps are painful and slower than normal due to pain of right foot and ankle.   Balance  Posture: Good  Sitting

## 2018-02-19 ENCOUNTER — HOSPITAL ENCOUNTER (OUTPATIENT)
Dept: PHYSICAL THERAPY | Age: 58
Setting detail: THERAPIES SERIES
Discharge: HOME | End: 2018-02-19
Payer: COMMERCIAL

## 2018-02-19 PROCEDURE — 97110 THERAPEUTIC EXERCISES: CPT

## 2018-02-19 ASSESSMENT — PAIN DESCRIPTION - LOCATION: LOCATION: FOOT;ANKLE

## 2018-02-19 ASSESSMENT — PAIN SCALES - GENERAL: PAINLEVEL_OUTOF10: 4

## 2018-02-19 ASSESSMENT — PAIN DESCRIPTION - FREQUENCY: FREQUENCY: INTERMITTENT

## 2018-02-19 ASSESSMENT — PAIN DESCRIPTION - PAIN TYPE: TYPE: CHRONIC PAIN

## 2018-02-19 ASSESSMENT — PAIN DESCRIPTION - PROGRESSION: CLINICAL_PROGRESSION: GRADUALLY IMPROVING

## 2018-02-19 ASSESSMENT — PAIN DESCRIPTION - ONSET: ONSET: PROGRESSIVE

## 2018-02-19 ASSESSMENT — PAIN DESCRIPTION - DESCRIPTORS: DESCRIPTORS: TENDER;ACHING

## 2018-02-19 ASSESSMENT — PAIN DESCRIPTION - ORIENTATION: ORIENTATION: RIGHT;DISTAL

## 2018-02-19 NOTE — FLOWSHEET NOTE
[address]   Outpatient Physical Therapy  [address] [facility]. [address]  Phone: [phone]  Fax: [phone]  Daily Progress Note    Date: 18    Patient Name: Mary Gomes        MRN: [id number]  Account: [de-identified] : 1960      General Information:  Chart Reviewed: Yes  Patient assessed for rehabilitation services?: Yes  Response To Previous Treatment: Not applicable  Family / Caregiver Present: Yes  Referring Practitioner: Grant Wood  Referral Date : 18  Diagnosis: Right peroneal tendinitis  Follows Commands: Within Functional Limits  Onset Date: 16  PT Insurance Information: [email or web address]   Total # of Visits Approved: 12  Total # of Visits to Date: 2  Plan of Care/Certification Expiration Date: 18  No Show: 0  Progress Note Due Date: 18  Canceled Appointment: 0    Subjective:  Subjective: Patient arrived with no ambulation boot with FWB. States she only wears her boot when her R foot pain is increased. Pain noted on R lateral foot (pointing to base of 5th metatarsal). Pain:  Patient Currently in Pain: Yes  Pain Assessment: 0-10  Pain Level: 4  Pain Type: Chronic pain  Pain Location: Foot; Ankle  Pain Orientation: Right;Distal  Pain Descriptors: Tender;Aching  Pain Frequency: Intermittent  Pain Onset: Progressive  Clinical Progression: Gradually improving  Patient's Stated Pain Goal: No pain  Pain Intervention(s): Medication (see eMar)  Response to Pain Intervention: None       Objective:  Exercise 1: HEP - AROM right foot/ankle PF/DF EV/IV CW/CCW 10 x each   Exercise 2: Heat to foot/ankle as carter  Exercise 3: Boot as before   Exercise 4: Elevation  Exercise 5: Bike next treatment  Exercise 6: BAPS level 1 A/P, M/L, CW, CCW 10x  Exercise 7: Wedge  Exercise 8: AROM 4 way ankle/ CW/ CCW 10x ea  Exercise 10: CP to R foot at end of session  Exercise 11:  Toes curls 10x  Exercise 12: PROM 10'          Comment:  Comments: Pain noted following

## 2018-02-21 ENCOUNTER — HOSPITAL ENCOUNTER (OUTPATIENT)
Dept: PHYSICAL THERAPY | Age: 58
Setting detail: THERAPIES SERIES
Discharge: HOME | End: 2018-02-21
Payer: COMMERCIAL

## 2018-02-21 PROCEDURE — 97110 THERAPEUTIC EXERCISES: CPT

## 2018-02-21 ASSESSMENT — PAIN DESCRIPTION - LOCATION: LOCATION: FOOT;ANKLE

## 2018-02-21 ASSESSMENT — PAIN DESCRIPTION - PROGRESSION: CLINICAL_PROGRESSION: GRADUALLY IMPROVING

## 2018-02-21 ASSESSMENT — PAIN DESCRIPTION - FREQUENCY: FREQUENCY: CONTINUOUS

## 2018-02-21 ASSESSMENT — PAIN DESCRIPTION - PAIN TYPE: TYPE: CHRONIC PAIN

## 2018-02-21 ASSESSMENT — PAIN DESCRIPTION - ONSET: ONSET: ON-GOING

## 2018-02-21 ASSESSMENT — PAIN SCALES - GENERAL: PAINLEVEL_OUTOF10: 4

## 2018-02-21 ASSESSMENT — PAIN DESCRIPTION - ORIENTATION: ORIENTATION: RIGHT;DISTAL

## 2018-02-21 ASSESSMENT — PAIN DESCRIPTION - DESCRIPTORS: DESCRIPTORS: TENDER;ACHING

## 2018-02-21 NOTE — FLOWSHEET NOTE
[address]   Outpatient Physical Therapy  [address] [facility]. [address]  Phone: [phone]  Fax: [phone]  Daily Progress Note    Date: 18    Patient Name: Benny Livingston        MRN: [id number]  Account: [de-identified] : 1960      General Information:  Chart Reviewed: Yes  Patient assessed for rehabilitation services?: Yes  Response To Previous Treatment: Not applicable  Family / Caregiver Present: Yes  Referring Practitioner: Tere Dolan  Referral Date : 18  Diagnosis: Right peroneal tendinitis  Follows Commands: Within Functional Limits  Onset Date: 16  PT Insurance Information: [employer] 63   Total # of Visits Approved: 12  Total # of Visits to Date: 3  Plan of Care/Certification Expiration Date: 18  No Show: 0  Progress Note Due Date: 18  Canceled Appointment: 0    Subjective:  Subjective: Patient reports lateral ankle pain into the side of her foot     Pain:  Patient Currently in Pain: Yes  Pain Assessment: 0-10  Pain Level: 4  Pain Type: Chronic pain  Pain Location: Foot; Ankle  Pain Orientation: Right;Distal  Pain Descriptors: Tender;Aching  Pain Frequency: Continuous  Pain Onset: On-going  Clinical Progression: Gradually improving  Patient's Stated Pain Goal: No pain  Pain Intervention(s): Medication (see eMar)  Response to Pain Intervention: None       Objective:  Exercise 1: HEP - AROM right foot/ankle PF/DF EV/IV CW/CCW 10 x each   Exercise 2: Heat to foot/ankle as carter  Exercise 3: Boot as before   Exercise 4: Elevation  Exercise 5: NuStep L1 5 min  Exercise 6: BAPS level 1 A/P, M/L, CW, CCW 10x  Exercise 7: Wedge  Exercise 8: AROM 4 way ankle/ CW/ CCW 10x ea  Exercise 10: CP to R foot at end of session  Exercise 11: Toes curls 10x  Exercise 12: PROM 10'  Exercise 13: Isometric 4 way ankle          Comment:  Comments: Patient asked to bring boot next week to therapy to start standing exercises    Assessment:   Body structures, Functions,

## 2018-02-26 ENCOUNTER — HOSPITAL ENCOUNTER (OUTPATIENT)
Dept: WOMENS IMAGING | Age: 58
Discharge: HOME | End: 2018-02-28
Payer: COMMERCIAL

## 2018-02-26 ENCOUNTER — HOSPITAL ENCOUNTER (OUTPATIENT)
Dept: PHYSICAL THERAPY | Age: 58
Setting detail: THERAPIES SERIES
Discharge: HOME | End: 2018-02-26
Payer: COMMERCIAL

## 2018-02-26 DIAGNOSIS — Z13.9 ENCOUNTER FOR SCREENING: ICD-10-CM

## 2018-02-26 PROCEDURE — 77063 BREAST TOMOSYNTHESIS BI: CPT

## 2018-02-26 PROCEDURE — 97110 THERAPEUTIC EXERCISES: CPT

## 2018-02-26 ASSESSMENT — PAIN SCALES - GENERAL: PAINLEVEL_OUTOF10: 4

## 2018-02-26 ASSESSMENT — PAIN DESCRIPTION - FREQUENCY: FREQUENCY: CONTINUOUS

## 2018-02-26 ASSESSMENT — PAIN DESCRIPTION - ORIENTATION: ORIENTATION: RIGHT;DISTAL

## 2018-02-26 ASSESSMENT — PAIN DESCRIPTION - PROGRESSION: CLINICAL_PROGRESSION: GRADUALLY IMPROVING

## 2018-02-26 ASSESSMENT — PAIN DESCRIPTION - DESCRIPTORS: DESCRIPTORS: TENDER;ACHING

## 2018-02-26 ASSESSMENT — PAIN DESCRIPTION - PAIN TYPE: TYPE: CHRONIC PAIN

## 2018-02-26 ASSESSMENT — PAIN DESCRIPTION - LOCATION: LOCATION: FOOT;ANKLE

## 2018-02-28 ENCOUNTER — HOSPITAL ENCOUNTER (OUTPATIENT)
Dept: PHYSICAL THERAPY | Age: 58
Setting detail: THERAPIES SERIES
Discharge: HOME | End: 2018-02-28
Payer: COMMERCIAL

## 2018-02-28 PROCEDURE — 97110 THERAPEUTIC EXERCISES: CPT

## 2018-02-28 ASSESSMENT — PAIN DESCRIPTION - PAIN TYPE: TYPE: CHRONIC PAIN

## 2018-02-28 ASSESSMENT — PAIN DESCRIPTION - LOCATION: LOCATION: ANKLE;FOOT

## 2018-02-28 ASSESSMENT — PAIN DESCRIPTION - ORIENTATION: ORIENTATION: RIGHT;DISTAL

## 2018-02-28 ASSESSMENT — PAIN DESCRIPTION - DESCRIPTORS: DESCRIPTORS: TENDER;ACHING

## 2018-02-28 ASSESSMENT — PAIN DESCRIPTION - PROGRESSION: CLINICAL_PROGRESSION: GRADUALLY IMPROVING

## 2018-02-28 ASSESSMENT — PAIN DESCRIPTION - FREQUENCY: FREQUENCY: CONTINUOUS

## 2018-02-28 ASSESSMENT — PAIN SCALES - GENERAL: PAINLEVEL_OUTOF10: 4

## 2018-02-28 NOTE — PROGRESS NOTES
Physical Therapy  Update Note  Date: 2018  Patient Name: Han Carballo  MRN: [id number]     :   1960    Subjective:   General  Referring Practitioner: Fiona Cortez  PT Visit Information  Onset Date: 16  PT Insurance Information: Noé 63   Total # of Visits Approved: 12  Total # of Visits to Date: 5  Plan of Care/Certification Expiration Date: 18  No Show: 0  Progress Note Due Date: 18  Canceled Appointment: 0  Subjective  Subjective: Able to due home program 4 -6 times a day with same change in her foot pain. Does still have pain on the top of her foot   Pain Screening  Patient Currently in Pain: Yes  Pain Assessment  Pain Assessment: 0-10  Pain Level: 4  Pain Type: Chronic pain  Pain Location: Ankle; Foot  Pain Orientation: Right;Distal  Pain Descriptors: Tender;Aching  Pain Frequency: Continuous  Clinical Progression: Gradually improving  Patient's Stated Pain Goal: No pain  Pain Intervention(s): Medication (see eMar);MD notified (comment); Ambulation/Increased activity; Rest;Relaxation techniques  Response to Pain Intervention: None  Vital Signs  Patient Currently in Pain: Yes  Patient Observation  Observations: Limited mobility of right foot and ankle due to pain. Treatment Activities:      Bed mobility  Bridging: Independent  Transfers  Sit to Stand: Independent        Ambulation  Ambulation?: Yes  WB Status: WBAT RLE  Ambulation 1  Surface: level tile  Device: No Device  Assistance: Independent  Quality of Gait: Fair-poor:  foot plant to toe off is limited due to ankle/foot pain, LE abducted and ER at stance phase. Distance: limited to 1/4 mile or less   Stairs/Curb  Stairs?: Yes  Stairs  # Steps : 10  Stairs Height: 8\"  Rails: Right ascending  Assistance: Independent  Comment: up and down steps are painful and slower than normal due to pain of right foot and ankle.      Balance  Posture: Good  Sitting - Static: Good  Sitting - Dynamic: Good  Standing - for Long term goals : 4 weeks   Long term goal 1: OPTIMAL score of 3/3  at the time of her discharge. (not met )  Patient Goals   Patient goals : Walk without pain     Plan:     Continue with lower back evaluation and changing foot symptoms.    Timed Code Treatment Minutes: 45 Minutes  Total Treatment Time: 45  Frequency and duration of TX  Days: 3  Weeks: 4     Therapy Time   Individual Concurrent Group Co-treatment   Time In  1700         Time Out  1745         Minutes  45 min   The          Timed Code Treatment Minutes: 70 Reva Joya, PT

## 2018-03-01 ENCOUNTER — HOSPITAL ENCOUNTER (OUTPATIENT)
Dept: PHYSICAL THERAPY | Age: 58
Setting detail: THERAPIES SERIES
Discharge: HOME | End: 2018-03-01
Payer: COMMERCIAL

## 2018-03-01 ENCOUNTER — TELEPHONE (OUTPATIENT)
Dept: FAMILY MEDICINE CLINIC | Age: 58
End: 2018-03-01

## 2018-03-01 ASSESSMENT — PAIN DESCRIPTION - PROGRESSION: CLINICAL_PROGRESSION: GRADUALLY IMPROVING

## 2018-03-07 ENCOUNTER — HOSPITAL ENCOUNTER (OUTPATIENT)
Dept: PHYSICAL THERAPY | Age: 58
Setting detail: THERAPIES SERIES
Discharge: HOME | End: 2018-03-07
Payer: COMMERCIAL

## 2018-03-07 PROCEDURE — 97110 THERAPEUTIC EXERCISES: CPT

## 2018-03-07 ASSESSMENT — PAIN SCALES - GENERAL: PAINLEVEL_OUTOF10: 4

## 2018-03-07 ASSESSMENT — PAIN DESCRIPTION - FREQUENCY: FREQUENCY: CONTINUOUS

## 2018-03-07 ASSESSMENT — PAIN DESCRIPTION - DESCRIPTORS: DESCRIPTORS: TENDER

## 2018-03-07 ASSESSMENT — PAIN DESCRIPTION - ORIENTATION: ORIENTATION: RIGHT;DISTAL

## 2018-03-07 ASSESSMENT — PAIN DESCRIPTION - PAIN TYPE: TYPE: CHRONIC PAIN

## 2018-03-07 ASSESSMENT — PAIN DESCRIPTION - LOCATION: LOCATION: ANKLE;FOOT

## 2018-03-07 ASSESSMENT — PAIN DESCRIPTION - PROGRESSION: CLINICAL_PROGRESSION: GRADUALLY IMPROVING

## 2018-03-09 ENCOUNTER — TELEPHONE (OUTPATIENT)
Dept: FAMILY MEDICINE CLINIC | Age: 58
End: 2018-03-09

## 2018-03-12 ENCOUNTER — APPOINTMENT (OUTPATIENT)
Dept: PHYSICAL THERAPY | Age: 58
End: 2018-03-12
Payer: COMMERCIAL

## 2018-03-12 RX ORDER — ASPIRIN AND DIPYRIDAMOLE 25; 200 MG/1; MG/1
1 CAPSULE, EXTENDED RELEASE ORAL 2 TIMES DAILY
Qty: 180 CAPSULE | Refills: 3 | Status: SHIPPED | OUTPATIENT
Start: 2018-03-12 | End: 2018-08-21 | Stop reason: ALTCHOICE

## 2018-03-14 ENCOUNTER — HOSPITAL ENCOUNTER (OUTPATIENT)
Dept: PHYSICAL THERAPY | Age: 58
Setting detail: THERAPIES SERIES
Discharge: HOME | End: 2018-03-14
Payer: COMMERCIAL

## 2018-03-16 ENCOUNTER — CARE COORDINATION (OUTPATIENT)
Dept: CARE COORDINATION | Age: 58
End: 2018-03-16

## 2018-03-20 VITALS — WEIGHT: 212 LBS | BODY MASS INDEX: 37.56 KG/M2

## 2018-03-21 ENCOUNTER — HOSPITAL ENCOUNTER (OUTPATIENT)
Dept: PHYSICAL THERAPY | Age: 58
Setting detail: THERAPIES SERIES
Discharge: HOME | End: 2018-03-21
Payer: COMMERCIAL

## 2018-03-21 PROCEDURE — 97110 THERAPEUTIC EXERCISES: CPT

## 2018-03-21 ASSESSMENT — PAIN DESCRIPTION - PAIN TYPE: TYPE: CHRONIC PAIN

## 2018-03-21 ASSESSMENT — PAIN DESCRIPTION - LOCATION: LOCATION: ANKLE;FOOT

## 2018-03-21 ASSESSMENT — PAIN DESCRIPTION - DESCRIPTORS: DESCRIPTORS: SORE

## 2018-03-21 ASSESSMENT — PAIN DESCRIPTION - PROGRESSION: CLINICAL_PROGRESSION: GRADUALLY IMPROVING

## 2018-03-21 ASSESSMENT — PAIN DESCRIPTION - ORIENTATION: ORIENTATION: RIGHT;DISTAL

## 2018-03-21 ASSESSMENT — PAIN DESCRIPTION - FREQUENCY: FREQUENCY: CONTINUOUS

## 2018-03-21 ASSESSMENT — PAIN SCALES - GENERAL: PAINLEVEL_OUTOF10: 4

## 2018-03-29 ENCOUNTER — HOSPITAL ENCOUNTER (OUTPATIENT)
Dept: PHYSICAL THERAPY | Age: 58
Setting detail: THERAPIES SERIES
Discharge: HOME | End: 2018-03-29
Payer: COMMERCIAL

## 2018-03-29 RX ORDER — HYDROCHLOROTHIAZIDE 25 MG/1
25 TABLET ORAL DAILY
Qty: 90 TABLET | Refills: 3 | Status: SHIPPED | OUTPATIENT
Start: 2018-03-29 | End: 2018-08-22 | Stop reason: CLARIF

## 2018-03-29 NOTE — TELEPHONE ENCOUNTER
Chronic serous otitis media     Nasal polyps     Nasal congestion     Osteoarthritis of left knee     Osteoarthritis of right knee     DIEGO (nonalcoholic steatohepatitis)     Vitamin D deficiency     Iron deficiency anemia     Dyslipidemia     Diabetes mellitus type 2, uncontrolled (HCC)     Left hip pain     Trochanteric bursitis     Fatigue     Daytime somnolence     Snoring

## 2018-04-06 RX ORDER — ROSUVASTATIN CALCIUM 10 MG/1
TABLET, COATED ORAL
Qty: 90 TABLET | Refills: 3 | Status: SHIPPED | OUTPATIENT
Start: 2018-04-06 | End: 2019-04-08 | Stop reason: SDUPTHER

## 2018-04-09 RX ORDER — BUSPIRONE HYDROCHLORIDE 10 MG/1
TABLET ORAL
Qty: 60 TABLET | Refills: 1 | Status: SHIPPED | OUTPATIENT
Start: 2018-04-09 | End: 2018-06-04 | Stop reason: SDUPTHER

## 2018-04-12 ENCOUNTER — HOSPITAL ENCOUNTER (OUTPATIENT)
Dept: PHYSICAL THERAPY | Age: 58
Setting detail: THERAPIES SERIES
Discharge: HOME | End: 2018-04-12
Payer: COMMERCIAL

## 2018-04-14 ENCOUNTER — EMPLOYEE WELLNESS (OUTPATIENT)
Dept: OTHER | Age: 58
End: 2018-04-14

## 2018-04-14 LAB
CHOLESTEROL/HDL RATIO: 3.1
CHOLESTEROL: 173 MG/DL
GLUCOSE BLD-MCNC: 196 MG/DL (ref 70–99)
HDLC SERPL-MCNC: 55 MG/DL
LDL CHOLESTEROL: 56 MG/DL (ref 0–130)
PATIENT FASTING?: YES
TRIGL SERPL-MCNC: 312 MG/DL
VLDLC SERPL CALC-MCNC: ABNORMAL MG/DL (ref 1–30)

## 2018-04-15 LAB
ESTIMATED AVERAGE GLUCOSE: 223 MG/DL
HBA1C MFR BLD: 9.4 % (ref 4–6)

## 2018-04-16 ENCOUNTER — HOSPITAL ENCOUNTER (OUTPATIENT)
Dept: PHYSICAL THERAPY | Age: 58
Setting detail: THERAPIES SERIES
Discharge: HOME | End: 2018-04-16
Payer: COMMERCIAL

## 2018-04-16 PROCEDURE — 97110 THERAPEUTIC EXERCISES: CPT

## 2018-04-16 ASSESSMENT — PAIN DESCRIPTION - DESCRIPTORS: DESCRIPTORS: SORE

## 2018-04-16 ASSESSMENT — PAIN DESCRIPTION - FREQUENCY: FREQUENCY: INTERMITTENT

## 2018-04-16 ASSESSMENT — PAIN SCALES - GENERAL: PAINLEVEL_OUTOF10: 3

## 2018-04-16 ASSESSMENT — PAIN DESCRIPTION - ONSET: ONSET: ON-GOING

## 2018-04-16 ASSESSMENT — PAIN DESCRIPTION - PAIN TYPE: TYPE: CHRONIC PAIN

## 2018-04-16 ASSESSMENT — PAIN DESCRIPTION - LOCATION: LOCATION: ANKLE;FOOT

## 2018-04-16 ASSESSMENT — PAIN DESCRIPTION - ORIENTATION: ORIENTATION: RIGHT;DISTAL

## 2018-04-16 ASSESSMENT — PAIN DESCRIPTION - PROGRESSION: CLINICAL_PROGRESSION: GRADUALLY IMPROVING

## 2018-04-17 ENCOUNTER — OFFICE VISIT (OUTPATIENT)
Dept: FAMILY MEDICINE CLINIC | Age: 58
End: 2018-04-17
Payer: COMMERCIAL

## 2018-04-17 ENCOUNTER — TELEPHONE (OUTPATIENT)
Dept: PHARMACY | Facility: CLINIC | Age: 58
End: 2018-04-17

## 2018-04-17 ENCOUNTER — TELEPHONE (OUTPATIENT)
Dept: FAMILY MEDICINE CLINIC | Age: 58
End: 2018-04-17

## 2018-04-17 VITALS
SYSTOLIC BLOOD PRESSURE: 138 MMHG | BODY MASS INDEX: 38.27 KG/M2 | DIASTOLIC BLOOD PRESSURE: 87 MMHG | HEART RATE: 85 BPM | TEMPERATURE: 98.3 F | WEIGHT: 216 LBS

## 2018-04-17 DIAGNOSIS — M79.602 ARM PAIN, MEDIAL, LEFT: Primary | ICD-10-CM

## 2018-04-17 PROCEDURE — 99213 OFFICE O/P EST LOW 20 MIN: CPT | Performed by: FAMILY MEDICINE

## 2018-04-18 ENCOUNTER — HOSPITAL ENCOUNTER (OUTPATIENT)
Dept: PHYSICAL THERAPY | Age: 58
Setting detail: THERAPIES SERIES
Discharge: HOME | End: 2018-04-18
Payer: COMMERCIAL

## 2018-04-18 PROCEDURE — 97110 THERAPEUTIC EXERCISES: CPT

## 2018-04-18 ASSESSMENT — PAIN SCALES - GENERAL: PAINLEVEL_OUTOF10: 3

## 2018-04-18 ASSESSMENT — PAIN DESCRIPTION - PROGRESSION: CLINICAL_PROGRESSION: GRADUALLY IMPROVING

## 2018-04-23 ENCOUNTER — HOSPITAL ENCOUNTER (OUTPATIENT)
Dept: PHYSICAL THERAPY | Age: 58
Setting detail: THERAPIES SERIES
Discharge: HOME | End: 2018-04-23
Payer: COMMERCIAL

## 2018-04-23 VITALS — WEIGHT: 217 LBS | BODY MASS INDEX: 38.45 KG/M2

## 2018-04-23 PROCEDURE — 97110 THERAPEUTIC EXERCISES: CPT

## 2018-04-23 ASSESSMENT — PAIN DESCRIPTION - PAIN TYPE: TYPE: CHRONIC PAIN

## 2018-04-23 ASSESSMENT — PAIN DESCRIPTION - DESCRIPTORS: DESCRIPTORS: SORE

## 2018-04-23 ASSESSMENT — PAIN DESCRIPTION - PROGRESSION: CLINICAL_PROGRESSION: GRADUALLY IMPROVING

## 2018-04-23 ASSESSMENT — PAIN DESCRIPTION - LOCATION: LOCATION: ANKLE

## 2018-04-23 ASSESSMENT — PAIN SCALES - GENERAL: PAINLEVEL_OUTOF10: 3

## 2018-04-23 ASSESSMENT — PAIN DESCRIPTION - ORIENTATION: ORIENTATION: RIGHT;DISTAL

## 2018-04-23 ASSESSMENT — PAIN DESCRIPTION - ONSET: ONSET: ON-GOING

## 2018-04-23 ASSESSMENT — PAIN DESCRIPTION - FREQUENCY: FREQUENCY: INTERMITTENT

## 2018-04-24 ENCOUNTER — CARE COORDINATION (OUTPATIENT)
Dept: CARE COORDINATION | Age: 58
End: 2018-04-24

## 2018-04-25 ENCOUNTER — HOSPITAL ENCOUNTER (OUTPATIENT)
Dept: PHYSICAL THERAPY | Age: 58
Setting detail: THERAPIES SERIES
Discharge: HOME | End: 2018-04-25
Payer: COMMERCIAL

## 2018-04-25 PROCEDURE — 97110 THERAPEUTIC EXERCISES: CPT

## 2018-04-25 ASSESSMENT — PAIN DESCRIPTION - ORIENTATION: ORIENTATION: RIGHT;ANTERIOR

## 2018-04-25 ASSESSMENT — PAIN DESCRIPTION - FREQUENCY: FREQUENCY: INTERMITTENT

## 2018-04-25 ASSESSMENT — PAIN DESCRIPTION - ONSET: ONSET: ON-GOING

## 2018-04-25 ASSESSMENT — PAIN DESCRIPTION - LOCATION: LOCATION: ANKLE

## 2018-04-25 ASSESSMENT — PAIN SCALES - GENERAL: PAINLEVEL_OUTOF10: 2

## 2018-04-25 ASSESSMENT — PAIN DESCRIPTION - DESCRIPTORS: DESCRIPTORS: SORE

## 2018-04-25 ASSESSMENT — PAIN DESCRIPTION - PAIN TYPE: TYPE: CHRONIC PAIN

## 2018-04-25 ASSESSMENT — PAIN DESCRIPTION - PROGRESSION: CLINICAL_PROGRESSION: GRADUALLY IMPROVING

## 2018-04-30 ENCOUNTER — HOSPITAL ENCOUNTER (OUTPATIENT)
Dept: PHYSICAL THERAPY | Age: 58
Setting detail: THERAPIES SERIES
Discharge: HOME | End: 2018-04-30
Payer: COMMERCIAL

## 2018-04-30 PROCEDURE — 97110 THERAPEUTIC EXERCISES: CPT

## 2018-04-30 ASSESSMENT — PAIN DESCRIPTION - DESCRIPTORS: DESCRIPTORS: SORE

## 2018-04-30 ASSESSMENT — PAIN DESCRIPTION - PROGRESSION: CLINICAL_PROGRESSION: GRADUALLY IMPROVING

## 2018-04-30 ASSESSMENT — PAIN DESCRIPTION - PAIN TYPE: TYPE: CHRONIC PAIN

## 2018-04-30 ASSESSMENT — PAIN DESCRIPTION - ORIENTATION: ORIENTATION: RIGHT;ANTERIOR

## 2018-04-30 ASSESSMENT — PAIN DESCRIPTION - LOCATION: LOCATION: ANKLE

## 2018-04-30 ASSESSMENT — PAIN DESCRIPTION - ONSET: ONSET: ON-GOING

## 2018-04-30 ASSESSMENT — PAIN DESCRIPTION - FREQUENCY: FREQUENCY: INTERMITTENT

## 2018-04-30 ASSESSMENT — PAIN SCALES - GENERAL: PAINLEVEL_OUTOF10: 2

## 2018-05-02 ASSESSMENT — ENCOUNTER SYMPTOMS: SHORTNESS OF BREATH: 0

## 2018-05-09 ENCOUNTER — HOSPITAL ENCOUNTER (OUTPATIENT)
Dept: GENERAL RADIOLOGY | Facility: CLINIC | Age: 58
Discharge: HOME | End: 2018-05-11
Payer: COMMERCIAL

## 2018-05-09 ENCOUNTER — HOSPITAL ENCOUNTER (OUTPATIENT)
Facility: CLINIC | Age: 58
Discharge: HOME | End: 2018-05-11
Payer: COMMERCIAL

## 2018-05-09 ENCOUNTER — CLINICAL DOCUMENTATION (OUTPATIENT)
Dept: PHARMACY | Facility: CLINIC | Age: 58
End: 2018-05-09

## 2018-05-09 DIAGNOSIS — R52 PAIN: ICD-10-CM

## 2018-05-09 PROCEDURE — 72100 X-RAY EXAM L-S SPINE 2/3 VWS: CPT

## 2018-05-10 RX ORDER — CANAGLIFLOZIN 100 MG/1
TABLET, FILM COATED ORAL
Qty: 30 TABLET | Refills: 5 | Status: SHIPPED | OUTPATIENT
Start: 2018-05-10 | End: 2018-10-17 | Stop reason: ALTCHOICE

## 2018-05-25 RX ORDER — GLYBURIDE 5 MG/1
TABLET ORAL
Qty: 360 TABLET | Refills: 0 | Status: SHIPPED | OUTPATIENT
Start: 2018-05-25 | End: 2018-08-21 | Stop reason: SDUPTHER

## 2018-06-04 RX ORDER — BUSPIRONE HYDROCHLORIDE 10 MG/1
TABLET ORAL
Qty: 60 TABLET | Refills: 1 | Status: SHIPPED | OUTPATIENT
Start: 2018-06-04 | End: 2018-08-21 | Stop reason: SDUPTHER

## 2018-06-14 ENCOUNTER — CARE COORDINATION (OUTPATIENT)
Dept: CARE COORDINATION | Age: 58
End: 2018-06-14

## 2018-06-27 ENCOUNTER — TELEPHONE (OUTPATIENT)
Dept: PHARMACY | Facility: CLINIC | Age: 58
End: 2018-06-27

## 2018-06-27 ENCOUNTER — CLINICAL DOCUMENTATION (OUTPATIENT)
Dept: PHARMACY | Facility: CLINIC | Age: 58
End: 2018-06-27

## 2018-07-10 RX ORDER — LISINOPRIL 20 MG/1
TABLET ORAL
Qty: 90 TABLET | Refills: 3 | Status: SHIPPED | OUTPATIENT
Start: 2018-07-10 | End: 2018-08-22 | Stop reason: CLARIF

## 2018-07-10 NOTE — TELEPHONE ENCOUNTER
Abnormal auditory perception     Eustachian tube dysfunction     Chronic serous otitis media     Nasal polyps     Nasal congestion     Osteoarthritis of left knee     Osteoarthritis of right knee     DIEGO (nonalcoholic steatohepatitis)     Vitamin D deficiency     Iron deficiency anemia     Dyslipidemia     Diabetes mellitus type 2, uncontrolled (HCC)     Left hip pain     Trochanteric bursitis     Fatigue     Daytime somnolence     Snoring

## 2018-07-13 RX ORDER — CYCLOBENZAPRINE HYDROCHLORIDE 7.5 MG/1
7.5 TABLET, FILM COATED ORAL 2 TIMES DAILY PRN
Qty: 30 TABLET | Refills: 0 | Status: SHIPPED | OUTPATIENT
Start: 2018-07-13 | End: 2018-07-23

## 2018-08-08 ENCOUNTER — CARE COORDINATION (OUTPATIENT)
Dept: CARE COORDINATION | Age: 58
End: 2018-08-08

## 2018-08-08 NOTE — CARE COORDINATION
left again today requesting a return call to [phone]. Plan:  Discuss patient with her PCP, DR. Emily Parkinson and care coordination manager. Sha Nj is not engaged with care coordinator. She to date is not checking her blood sugars. She has not obtained a glucometer.

## 2018-08-20 ENCOUNTER — TELEPHONE (OUTPATIENT)
Dept: PHARMACY | Facility: CLINIC | Age: 58
End: 2018-08-20

## 2018-08-20 NOTE — TELEPHONE ENCOUNTER
Smoker   Smokeless Tobacco    Never Used      ASSESSMENT:  Initial Program Requirements (to be completed by 7/1/2018):  [x] OV with provider for DM (1st)  [x] A1c (1st)  [x] On statin  [x] On ACEi/ARB    Ongoing Program Requirements (to be completed by 12/15/2018):  [] OV with provider for DM (2nd)  [] ACC/diabetes educator visit (if A1c over 8%)  [x] A1c (2nd)  [x] Lipid panel  [] Urine protein  [x] Pneumococcal vaccination: up to date  [] Influenza vaccination for 1685-1494  [] Medication adherence over 70%    Formulary Medication Review:  Non-formulary or medications with cost-effective alternatives:   - Glyburide    Current medications eligible for copay waiver, up to $300, through [facility]) (mail order) Pharmacy:  - glipizide (if replaces glyburide), Invokana, lisinopril, metformin, rosuvastatin   - Generic ([facility] pharmacy-stocked) insulin syringes and pen needles  - Agamatrix meter and supplies     Other Considerations:  - Glycemic Goal: <7.0% and directed by provider. Not at goal; patient reports medication adherence. Noted has not followed up or returned outreach of [facility].   - Blood Pressure Goal: BP less than 140/90 mmHg due to history of DM: Is at blood pressure goal   - Lipids: on moderate intensity statin  - Smoking status: never smoked    PLAN:  - Consideration(s) for provider:   · Rx request to [facility]: Agamatrix meter/strips/lancets  · Tablet consolidation request to P: lisinopril 20mg + HCTZ 25mg to lisinopril-HCTZ 20-25mg daily  · Formulary conversion request to P: glyburide 5mg, 2 tabs BID to glipizide 10mg BID  · Increase Invokana to 300mg daily and/or addition of GLP-1, such as Trulicity 2.31-8.8EI SQ weekly, although if patient is adherent to current medications (which she reports she is), starting basal insulin may be most effective  *see telephone encounter to PCP re above  - Invokana, metformin and rosuvastatin have 90-day rx's with refills remaining  - DM program gaps

## 2018-08-21 ENCOUNTER — TELEPHONE (OUTPATIENT)
Dept: FAMILY MEDICINE CLINIC | Age: 58
End: 2018-08-21

## 2018-08-21 RX ORDER — LANCETS 33 GAUGE
EACH MISCELLANEOUS
Qty: 100 EACH | Refills: 3 | Status: SHIPPED | OUTPATIENT
Start: 2018-08-21 | End: 2018-10-19 | Stop reason: SDUPTHER

## 2018-08-21 RX ORDER — BLOOD-GLUCOSE METER
EACH MISCELLANEOUS
Qty: 1 KIT | Refills: 0 | Status: SHIPPED | OUTPATIENT
Start: 2018-08-21 | End: 2018-10-19 | Stop reason: SDUPTHER

## 2018-08-21 RX ORDER — LISINOPRIL AND HYDROCHLOROTHIAZIDE 25; 20 MG/1; MG/1
1 TABLET ORAL DAILY
Qty: 90 TABLET | Refills: 1 | Status: SHIPPED | OUTPATIENT
Start: 2018-08-21 | End: 2018-10-19 | Stop reason: SDUPTHER

## 2018-08-21 RX ORDER — GLIPIZIDE 10 MG/1
10 TABLET ORAL
Qty: 180 TABLET | Refills: 1 | Status: SHIPPED | OUTPATIENT
Start: 2018-08-21 | End: 2018-10-19 | Stop reason: SDUPTHER

## 2018-08-21 NOTE — TELEPHONE ENCOUNTER
Keiko Amaya MD,  Your patient is currently enrolled in [facility].   Please assist, orders pended in this encounter for your signature/modification if you agree:  · Rx request to [facility]: Agamatrix BG meter meter/strips/lancets ($0 for patient through [facility] )  · Tablet consolidation request to [facility]: lisinopril 20mg + HCTZ 25mg to lisinopril-HCTZ 20-25mg daily (combination tab eligible for copay waiver to $0 for patient through mail order pharmacy)  · Formulary conversion request to [facility]: glyburide 5mg, 2 tabs BID to glipizide 10mg BID (glipizide eligible for copay waiver to $0 for patient through mail order pharmacy)      May consider: (9/11 appt - other medications eligible for copay waiver to $0 for patient through mail order pharmacy)  · Increase Invokana to 300mg daily   · And/or addition of GLP-1, such as Trulicity 0.76-7.4MJ SQ weekly (Victoza also included)  · Although if patient is adherent to current medications (which she reports she is), starting basal insulin may be most effective - Lantus and Toujeo (pens or vials) are preferred    Thank you,  Candace Thacker, PharmD, [address] [facility] [address]  Direct: [phone]  Department, toll free: [phone], option 7

## 2018-08-22 RX ORDER — BUSPIRONE HYDROCHLORIDE 10 MG/1
TABLET ORAL
Qty: 60 TABLET | Refills: 1 | Status: SHIPPED | OUTPATIENT
Start: 2018-08-22 | End: 2018-09-11 | Stop reason: SDUPTHER

## 2018-08-22 NOTE — TELEPHONE ENCOUNTER
of left knee     Osteoarthritis of right knee     DIEGO (nonalcoholic steatohepatitis)     Vitamin D deficiency     Iron deficiency anemia     Dyslipidemia     Diabetes mellitus type 2, uncontrolled (HCC)     Left hip pain     Trochanteric bursitis     Fatigue     Daytime somnolence     Snoring       Please address the medication refill and close the encounter. If I can be of assistance, please route to the applicable pool. Thank you.

## 2018-09-10 ENCOUNTER — HOSPITAL ENCOUNTER (OUTPATIENT)
Age: 58
Setting detail: SPECIMEN
Discharge: HOME | End: 2018-09-10
Payer: COMMERCIAL

## 2018-09-10 DIAGNOSIS — M89.8X9 BONE PAIN: ICD-10-CM

## 2018-09-10 DIAGNOSIS — E78.5 DYSLIPIDEMIA: ICD-10-CM

## 2018-09-10 DIAGNOSIS — R79.89 ABNORMAL LFTS: ICD-10-CM

## 2018-09-10 LAB
ALBUMIN SERPL-MCNC: 4 G/DL (ref 3.5–5.2)
ALBUMIN/GLOBULIN RATIO: 1.3 (ref 1–2.5)
ALP BLD-CCNC: 102 U/L (ref 35–104)
ALT SERPL-CCNC: 61 U/L (ref 5–33)
ANION GAP SERPL CALCULATED.3IONS-SCNC: 15 MMOL/L (ref 9–17)
AST SERPL-CCNC: 32 U/L
BILIRUB SERPL-MCNC: <0.1 MG/DL (ref 0.3–1.2)
BUN BLDV-MCNC: 20 MG/DL (ref 6–20)
BUN/CREAT BLD: ABNORMAL (ref 9–20)
CALCIUM SERPL-MCNC: 9.2 MG/DL (ref 8.6–10.4)
CHLORIDE BLD-SCNC: 101 MMOL/L (ref 98–107)
CO2: 24 MMOL/L (ref 20–31)
CREAT SERPL-MCNC: 0.84 MG/DL (ref 0.5–0.9)
GFR AFRICAN AMERICAN: >60 ML/MIN
GFR NON-AFRICAN AMERICAN: >60 ML/MIN
GFR SERPL CREATININE-BSD FRML MDRD: ABNORMAL ML/MIN/{1.73_M2}
GFR SERPL CREATININE-BSD FRML MDRD: ABNORMAL ML/MIN/{1.73_M2}
GLUCOSE FASTING: 262 MG/DL (ref 70–99)
POTASSIUM SERPL-SCNC: 4.3 MMOL/L (ref 3.7–5.3)
SODIUM BLD-SCNC: 140 MMOL/L (ref 135–144)
TOTAL PROTEIN: 7.2 G/DL (ref 6.4–8.3)

## 2018-09-11 ENCOUNTER — OFFICE VISIT (OUTPATIENT)
Dept: FAMILY MEDICINE CLINIC | Age: 58
End: 2018-09-11
Payer: COMMERCIAL

## 2018-09-11 ENCOUNTER — CARE COORDINATION (OUTPATIENT)
Dept: CARE COORDINATION | Age: 58
End: 2018-09-11

## 2018-09-11 VITALS
WEIGHT: 211.6 LBS | HEIGHT: 63 IN | HEART RATE: 79 BPM | DIASTOLIC BLOOD PRESSURE: 73 MMHG | BODY MASS INDEX: 37.49 KG/M2 | TEMPERATURE: 98.1 F | SYSTOLIC BLOOD PRESSURE: 131 MMHG

## 2018-09-11 DIAGNOSIS — R79.89 ABNORMAL LFTS: ICD-10-CM

## 2018-09-11 DIAGNOSIS — E11.9 TYPE 2 DIABETES MELLITUS WITHOUT COMPLICATION, WITHOUT LONG-TERM CURRENT USE OF INSULIN (HCC): Primary | ICD-10-CM

## 2018-09-11 LAB
HBA1C MFR BLD: 10.3 %
VITAMIN D 1,25-DIHYDROXY: 25.5 PG/ML (ref 19.9–79.3)

## 2018-09-11 PROCEDURE — 99213 OFFICE O/P EST LOW 20 MIN: CPT | Performed by: FAMILY MEDICINE

## 2018-09-11 PROCEDURE — 83036 HEMOGLOBIN GLYCOSYLATED A1C: CPT | Performed by: FAMILY MEDICINE

## 2018-09-11 RX ORDER — REPAGLINIDE 1 MG/1
1 TABLET ORAL
Qty: 90 TABLET | Refills: 3 | Status: SHIPPED | OUTPATIENT
Start: 2018-09-11 | End: 2018-10-19 | Stop reason: SDUPTHER

## 2018-09-11 RX ORDER — BUSPIRONE HYDROCHLORIDE 10 MG/1
TABLET ORAL
Qty: 180 TABLET | Refills: 3 | Status: SHIPPED | OUTPATIENT
Start: 2018-09-11 | End: 2019-06-17 | Stop reason: SDUPTHER

## 2018-09-11 RX ORDER — ASPIRIN 81 MG/1
81 TABLET ORAL DAILY
Qty: 30 TABLET | Refills: 3 | Status: SHIPPED | OUTPATIENT
Start: 2018-09-11 | End: 2018-10-19 | Stop reason: SDUPTHER

## 2018-09-11 ASSESSMENT — PATIENT HEALTH QUESTIONNAIRE - PHQ9
1. LITTLE INTEREST OR PLEASURE IN DOING THINGS: 0
SUM OF ALL RESPONSES TO PHQ QUESTIONS 1-9: 0
SUM OF ALL RESPONSES TO PHQ9 QUESTIONS 1 & 2: 0
SUM OF ALL RESPONSES TO PHQ QUESTIONS 1-9: 0
2. FEELING DOWN, DEPRESSED OR HOPELESS: 0

## 2018-09-11 NOTE — PROGRESS NOTES
DIABETES and HYPERTENSION visit    BP Readings from Last 3 Encounters:   09/11/18 131/73   04/17/18 138/87   02/13/18 134/78        Hemoglobin A1C (%)   Date Value   04/14/2018 9.4 (H)   02/13/2018 9.4   10/26/2017 9.6     Microalb/Crt. Ratio (mcg/mg creat)   Date Value   07/20/2017 33 (H)     LDL Cholesterol (mg/dL)   Date Value   04/14/2018 56     HDL (mg/dL)   Date Value   04/14/2018 55     BUN (mg/dL)   Date Value   09/10/2018 20     CREATININE (mg/dL)   Date Value   09/10/2018 0.84     Glucose (mg/dL)   Date Value   04/14/2018 196 (H)   05/19/2012 137 (H)            Have you changed or started any medications since your last visit including any over-the-counter medicines, vitamins, or herbal medicines? no   Have you stopped taking any of your medications? Is so, why? -  no  Are you having any side effects from any of your medications? - no    Have you seen any other physician or provider since your last visit? yes - PT   Have you had any other diagnostic tests since your last visit? yes - X-RAY, labs   Have you been seen in the emergency room and/or had an admission in a hospital since we last saw you?  no   Have you had your routine dental cleaning in the past 6 months?  yes      Have you had your annual diabetic retinal (eye) exam? Yes   (ensure copy of exam is in the chart)    Do you have an active MyChart account? If no, what is the barrier?   Yes    Patient Care Team:  Thomas Green MD as PCP - General (Family Medicine)  Thomas Green MD as PCP - S Attributed Provider  Jesús Salcido MD as Consulting Physician (Gastroenterology)  Jasiel Mac as Care Coordinator    Medical History Review  Past Medical, Family, and Social History reviewed and does contribute to the patient presenting condition    Health Maintenance   Topic Date Due    Shingles Vaccine (1 of 2 - 2 Dose Series) 03/10/2011    Diabetic retinal exam  01/12/2017    Cervical cancer screen  08/19/2017    Diabetic foot exam  12/01/2017    A1C test (Diabetic or Prediabetic)  07/14/2018    Diabetic microalbuminuria test  07/20/2018    Flu vaccine (1) 09/01/2018    Lipid screen  04/14/2019    Potassium monitoring  09/10/2019    Creatinine monitoring  09/10/2019    Breast cancer screen  02/26/2020    DTaP/Tdap/Td vaccine (2 - Tdap) 12/15/2020    Colon cancer screen colonoscopy  09/20/2022    Pneumococcal med risk  Completed    Hepatitis C screen  Completed    HIV screen  Completed

## 2018-09-11 NOTE — PROGRESS NOTES
2018     Jason Cedeño (:  1960) is a 63 y.o. female, here for evaluation of the following medical concerns:    HPI  Treatment Adherence:   Medication compliance:  compliant most of the time  Diet compliance:  compliant most of the time  Weight trend: stable  Current exercise: no regular exercise  Barriers: lack of motivation and stress    Diabetes Mellitus Type 2: Current symptoms/problems include none. Home blood sugar records: patient does not test  Any episodes of hypoglycemia? no  Eye exam current (within one year): yes  Tobacco history: She  reports that she has never smoked. She has never used smokeless tobacco.   Daily Aspirin? No: was taking Aggrenox    Hypertension:  Home blood pressure monitoring: No.  She is not adherent to a low sodium diet. Patient complains of chest pain, shortness of breath, headache, blurred vision and dry cough. Antihypertensive medication side effects: no medication side effects noted. Use of agents associated with hypertension: none. Hyperlipidemia:  No new myalgias or GI upset on rosuvastatin (Crestor). Lab Results   Component Value Date    LABA1C 9.4 (H) 2018    LABA1C 9.4 2018    LABA1C 9.6 10/26/2017     Lab Results   Component Value Date    LABMICR 33 (H) 2017    CREATININE 0.84 09/10/2018     Lab Results   Component Value Date    ALT 61 (H) 09/10/2018    AST 32 (H) 09/10/2018     Lab Results   Component Value Date    CHOL 173 2018    TRIG 312 (H) 2018    HDL 55 2018    LDLDIRECT 156 (H) 2017          Review of Systems    Prior to Visit Medications    Medication Sig Taking?  Authorizing Provider   busPIRone (BUSPAR) 10 MG tablet TAKE ONE TABLET BY MOUTH TWICE A DAY Yes Jovon Law MD   glipiZIDE (GLUCOTROL) 10 MG tablet Take 1 tablet by mouth 2 times daily (before meals) (replaces glyburide) Yes Susan Vera MD   lisinopril-hydrochlorothiazide (PRINZIDE;ZESTORETIC) 20-25 MG per tablet 47.1  - Basic Metabolic Panel; Future  Prandin increased to tid  Diet control    2. Abnormal LFTs  Crestor every 2 days  - Hepatitis Panel, Acute; Future      No Follow-up on file. An electronic signature was used to authenticate this note.     --Alexsandra Frias MD on 9/11/2018 at 10:20 AM

## 2018-09-11 NOTE — PATIENT INSTRUCTIONS
Thank you for letting us take care of you today. We hope all your questions were addressed. If a question was overlooked or something else comes to mind after you return home, please contact a member of your Care Team listed below. Please make sure you have a routine office visit set up to follow-up on [address]. Your Care Team at [place] [address] is Team #1  Ankur Gill MD (Faculty)  Anali Hamm MD (Faculty)  Magdiel Santiago MD (Resident)  Agueda Reyes MD (Resident)  Ольга Suero MD (Resident)  Quinton Flores MD (Resident)  Conrad Barrow., [facility], ([address])  SHERRY Leon, (Clinical Practice Manager)  Misbah Marroquin, Ph.D., (Behavioral Services)  Janeen Brady, [address] [place] [address] (Clinical Pharmacist)     Office phone number: [phone]    If you need to get in right away due to illness, please be advised we have \"Same Day\" appointments available Monday-Friday. Please call us at [phone] option #3 to schedule your \"Same Day\" appointment.

## 2018-10-02 ENCOUNTER — TELEPHONE (OUTPATIENT)
Dept: PHARMACY | Age: 58
End: 2018-10-02

## 2018-10-10 ENCOUNTER — PATIENT MESSAGE (OUTPATIENT)
Dept: PHARMACY | Facility: CLINIC | Age: 58
End: 2018-10-10

## 2018-10-12 ENCOUNTER — TELEPHONE (OUTPATIENT)
Dept: FAMILY MEDICINE CLINIC | Age: 58
End: 2018-10-12

## 2018-10-15 ENCOUNTER — CARE COORDINATION (OUTPATIENT)
Dept: CARE COORDINATION | Age: 58
End: 2018-10-15

## 2018-10-17 ENCOUNTER — CARE COORDINATION (OUTPATIENT)
Dept: CARE COORDINATION | Age: 58
End: 2018-10-17

## 2018-10-17 ENCOUNTER — OFFICE VISIT (OUTPATIENT)
Dept: FAMILY MEDICINE CLINIC | Age: 58
End: 2018-10-17
Payer: COMMERCIAL

## 2018-10-17 VITALS — WEIGHT: 211 LBS | BODY MASS INDEX: 37.39 KG/M2 | HEIGHT: 63 IN | TEMPERATURE: 97.1 F

## 2018-10-17 DIAGNOSIS — E11.65 UNCONTROLLED TYPE 2 DIABETES MELLITUS WITH HYPERGLYCEMIA (HCC): Primary | ICD-10-CM

## 2018-10-17 LAB
GLUCOSE BLD-MCNC: 228 MG/DL
HBA1C MFR BLD: 10.4 %

## 2018-10-17 PROCEDURE — 83036 HEMOGLOBIN GLYCOSYLATED A1C: CPT | Performed by: FAMILY MEDICINE

## 2018-10-17 PROCEDURE — 99213 OFFICE O/P EST LOW 20 MIN: CPT | Performed by: FAMILY MEDICINE

## 2018-10-17 PROCEDURE — 82962 GLUCOSE BLOOD TEST: CPT | Performed by: FAMILY MEDICINE

## 2018-10-17 ASSESSMENT — ENCOUNTER SYMPTOMS
CONSTIPATION: 0
SHORTNESS OF BREATH: 0
COUGH: 0
DIARRHEA: 0
CHEST TIGHTNESS: 0

## 2018-10-17 NOTE — PROGRESS NOTES
10/17/2018     Casey Melton (:  1960) is a 63 y.o. female, here for evaluation of the following medical concerns:    HPI  Treatment Adherence:   Medication compliance:  compliant most of the time  Diet compliance:  noncompliant: trying to eat better. Weight trend: stable  Current exercise: no regular exercise  Barriers: none    Diabetes Mellitus Type 2: Current symptoms/problems include none. Home blood sugar records: patient does not test  Any episodes of hypoglycemia? yes - yes was warm and shaky  Eye exam current (within one year): 2018}  Tobacco history: She  reports that she has never smoked. She has never used smokeless tobacco.   Daily Aspirin? Yes    Hypertension:  Home blood pressure monitoring: No.  She is not adherent to a low sodium diet. Patient denies chest pain, shortness of breath, headache, lightheadedness and blurred vision. Antihypertensive medication side effects: no medication side effects noted. Use of agents associated with hypertension: none. Hyperlipidemia:  No new myalgias or GI upset on rosuvastatin (Crestor). Lab Results   Component Value Date    LABA1C 10.3 2018    LABA1C 9.4 (H) 2018    LABA1C 9.4 2018     Lab Results   Component Value Date    LABMICR 33 (H) 2017    CREATININE 0.84 09/10/2018     Lab Results   Component Value Date    ALT 61 (H) 09/10/2018    AST 32 (H) 09/10/2018     Lab Results   Component Value Date    CHOL 173 2018    TRIG 312 (H) 2018    HDL 55 2018    LDLDIRECT 156 (H) 2017          Review of Systems   Constitutional: Negative for activity change and appetite change. Respiratory: Negative for cough, chest tightness and shortness of breath. Cardiovascular: Negative for chest pain and leg swelling. Gastrointestinal: Negative for constipation and diarrhea. Genitourinary: Negative for difficulty urinating and dysuria.        Prior to Visit Medications    Medication Sig

## 2018-10-18 NOTE — CARE COORDINATION
Benjamin Hendricks MD   rosuvastatin (CRESTOR) 10 MG tablet TAKE (1) TABLET BY MOUTH ONCE DAILY. 4/6/18   Kristina Olguin MD   Calcium Carbonate-Vitamin D (OYSTER SHELL CALCIUM/D) 500-200 MG-UNIT TABS Take 2 tablets by mouth daily 2/13/18 2/13/19  Kristina Olguin MD   omeprazole (PRILOSEC) 20 MG delayed release capsule TAKE (1) CAPSULE BY MOUTH TWICE DAILY. 2/8/18   Kristina Olguin MD   rOPINIRole (REQUIP) 2 MG tablet TAKE (2) TABLETS BY MOUTH ONCE DAILY AT NIGHT.  12/6/17   Kristina Olguin MD   metFORMIN (GLUCOPHAGE) 1000 MG tablet Take 1 tablet by mouth 2 times daily (with meals) 12/6/17   Kristina Olguin MD       Future Appointments  Date Time Provider Nicola Zaidi   11/20/2018 8:30 AM Kristina Olguin MD [facility]

## 2018-10-24 RX ORDER — ASPIRIN 81 MG/1
81 TABLET ORAL DAILY
Qty: 30 TABLET | Refills: 3 | Status: SHIPPED | OUTPATIENT
Start: 2018-10-24 | End: 2018-12-09 | Stop reason: SDUPTHER

## 2018-10-24 RX ORDER — REPAGLINIDE 1 MG/1
1 TABLET ORAL
Qty: 90 TABLET | Refills: 3 | Status: SHIPPED | OUTPATIENT
Start: 2018-10-24 | End: 2018-12-31 | Stop reason: SDUPTHER

## 2018-10-24 RX ORDER — BLOOD-GLUCOSE METER
EACH MISCELLANEOUS
Qty: 1 KIT | Refills: 0 | Status: SHIPPED | OUTPATIENT
Start: 2018-10-24 | End: 2019-06-26 | Stop reason: ALTCHOICE

## 2018-10-24 RX ORDER — LISINOPRIL AND HYDROCHLOROTHIAZIDE 25; 20 MG/1; MG/1
1 TABLET ORAL DAILY
Qty: 90 TABLET | Refills: 1 | Status: SHIPPED | OUTPATIENT
Start: 2018-10-24 | End: 2018-12-03 | Stop reason: DRUGHIGH

## 2018-10-24 RX ORDER — GLIPIZIDE 10 MG/1
10 TABLET ORAL
Qty: 180 TABLET | Refills: 1 | Status: SHIPPED | OUTPATIENT
Start: 2018-10-24 | End: 2019-06-18

## 2018-10-24 RX ORDER — LANCETS 33 GAUGE
EACH MISCELLANEOUS
Qty: 100 EACH | Refills: 3 | Status: SHIPPED | OUTPATIENT
Start: 2018-10-24 | End: 2019-06-26 | Stop reason: ALTCHOICE

## 2018-11-01 ENCOUNTER — TELEPHONE (OUTPATIENT)
Dept: PHARMACY | Facility: CLINIC | Age: 58
End: 2018-11-01

## 2018-11-13 RX ORDER — ROPINIROLE 2 MG/1
TABLET, FILM COATED ORAL
Qty: 180 TABLET | Refills: 3 | Status: SHIPPED | OUTPATIENT
Start: 2018-11-13 | End: 2019-10-19 | Stop reason: SDUPTHER

## 2018-11-16 ENCOUNTER — NURSE ONLY (OUTPATIENT)
Dept: FAMILY MEDICINE CLINIC | Age: 58
End: 2018-11-16
Payer: COMMERCIAL

## 2018-11-16 DIAGNOSIS — Z23 NEED FOR INFLUENZA VACCINATION: Primary | ICD-10-CM

## 2018-11-16 PROCEDURE — 90686 IIV4 VACC NO PRSV 0.5 ML IM: CPT | Performed by: FAMILY MEDICINE

## 2018-11-16 NOTE — PROGRESS NOTES
Patient here today for a nurse visit for annual flu shot. Screening checklist done and signed. Injected into  Left deltoid and patient tolerated well. VIS information given.

## 2018-11-26 ENCOUNTER — HOSPITAL ENCOUNTER (OUTPATIENT)
Age: 58
Setting detail: SPECIMEN
Discharge: HOME | End: 2018-11-26
Payer: COMMERCIAL

## 2018-11-26 ENCOUNTER — CARE COORDINATION (OUTPATIENT)
Dept: CARE COORDINATION | Age: 58
End: 2018-11-26

## 2018-11-26 LAB
CREATININE URINE: 117.5 MG/DL (ref 28–217)
MICROALBUMIN/CREAT 24H UR: <12 MG/L
MICROALBUMIN/CREAT UR-RTO: NORMAL MCG/MG CREAT

## 2018-12-03 RX ORDER — LISINOPRIL 40 MG/1
40 TABLET ORAL DAILY
Qty: 90 TABLET | Refills: 3 | Status: SHIPPED | OUTPATIENT
Start: 2018-12-03 | End: 2020-01-03

## 2018-12-03 RX ORDER — CHLORTHALIDONE 25 MG/1
25 TABLET ORAL DAILY
Qty: 90 TABLET | Refills: 3 | Status: SHIPPED | OUTPATIENT
Start: 2018-12-03 | End: 2020-02-10

## 2018-12-10 RX ORDER — ASPIRIN 81 MG/1
TABLET ORAL
Qty: 30 TABLET | Refills: 2 | Status: SHIPPED | OUTPATIENT
Start: 2018-12-10 | End: 2018-12-31 | Stop reason: SDUPTHER

## 2018-12-10 NOTE — TELEPHONE ENCOUNTER
Please address the medication refill and close the encounter. If I can be of assistance, please route to the applicable pool. Thank you. Last visit:10/16/18  Last Med refill:    Next Visit Date:  Future Appointments  Date Time Provider Nicola Gabrieli   12/12/2018 4:15 PM Elo Velasco MD [address] [place] [address] Maintenance   Topic Date Due    Shingles Vaccine (1 of 2 - 2 Dose Series) 03/10/2011    Diabetic retinal exam  01/12/2017    Diabetic foot exam  12/01/2017    A1C test (Diabetic or Prediabetic)  01/17/2019    Lipid screen  04/14/2019    Potassium monitoring  09/10/2019    Creatinine monitoring  09/10/2019    Diabetic microalbuminuria test  11/26/2019    Breast cancer screen  02/26/2020    DTaP/Tdap/Td vaccine (2 - Tdap) 12/15/2020    Colon cancer screen colonoscopy  09/20/2022    Flu vaccine  Completed    Pneumococcal med risk  Completed    Hepatitis C screen  Completed    HIV screen  Completed       Hemoglobin A1C (%)   Date Value   10/17/2018 10.4   09/11/2018 10.3   04/14/2018 9.4 (H)             ( goal A1C is < 7)   Microalb/Crt.  Ratio (mcg/mg creat)   Date Value   11/26/2018 CANNOT BE CALCULATED     LDL Cholesterol (mg/dL)   Date Value   04/14/2018 56   07/20/2017 90       (goal LDL is <100)   AST (U/L)   Date Value   09/10/2018 32 (H)     ALT (U/L)   Date Value   09/10/2018 61 (H)     BUN (mg/dL)   Date Value   09/10/2018 20     BP Readings from Last 3 Encounters:   09/11/18 131/73   04/17/18 138/87   02/13/18 134/78          (goal 120/80)    All Future Testing planned in [facility]  Lab Frequency Next Occurrence   Basic Metabolic Panel Once 69/00/3988   Hepatitis Panel, Acute Once 12/19/2018               Patient Active Problem List:     Cervical spondylosis     Type II or unspecified type diabetes mellitus without mention of complication, not stated as uncontrolled     Hypertension     Abnormal auditory perception     Eustachian tube dysfunction     Chronic serous otitis

## 2019-01-02 RX ORDER — ASPIRIN 81 MG/1
TABLET ORAL
Qty: 30 TABLET | Refills: 2 | Status: SHIPPED | OUTPATIENT
Start: 2019-01-02 | End: 2019-04-16 | Stop reason: SDUPTHER

## 2019-01-02 RX ORDER — REPAGLINIDE 1 MG/1
TABLET ORAL
Qty: 90 TABLET | Refills: 2 | Status: SHIPPED | OUTPATIENT
Start: 2019-01-02 | End: 2019-04-23 | Stop reason: SDUPTHER

## 2019-01-02 RX ORDER — EMPAGLIFLOZIN 10 MG/1
TABLET, FILM COATED ORAL
Qty: 30 TABLET | Refills: 3 | Status: SHIPPED | OUTPATIENT
Start: 2019-01-02 | End: 2019-06-18 | Stop reason: ALTCHOICE

## 2019-01-03 ENCOUNTER — OFFICE VISIT (OUTPATIENT)
Dept: FAMILY MEDICINE CLINIC | Age: 59
End: 2019-01-03
Payer: COMMERCIAL

## 2019-01-03 VITALS
HEART RATE: 74 BPM | WEIGHT: 203 LBS | HEIGHT: 63 IN | DIASTOLIC BLOOD PRESSURE: 70 MMHG | TEMPERATURE: 97.7 F | SYSTOLIC BLOOD PRESSURE: 104 MMHG | BODY MASS INDEX: 35.97 KG/M2

## 2019-01-03 DIAGNOSIS — M25.562 CHRONIC PAIN OF BOTH KNEES: ICD-10-CM

## 2019-01-03 DIAGNOSIS — G89.29 CHRONIC PAIN OF RIGHT KNEE: ICD-10-CM

## 2019-01-03 DIAGNOSIS — M25.561 CHRONIC PAIN OF BOTH KNEES: ICD-10-CM

## 2019-01-03 DIAGNOSIS — E11.9 TYPE 2 DIABETES MELLITUS WITHOUT COMPLICATION, WITH LONG-TERM CURRENT USE OF INSULIN (HCC): ICD-10-CM

## 2019-01-03 DIAGNOSIS — Z79.4 TYPE 2 DIABETES MELLITUS WITHOUT COMPLICATION, WITH LONG-TERM CURRENT USE OF INSULIN (HCC): ICD-10-CM

## 2019-01-03 DIAGNOSIS — E78.5 DYSLIPIDEMIA: Primary | ICD-10-CM

## 2019-01-03 DIAGNOSIS — G89.29 CHRONIC PAIN OF BOTH KNEES: ICD-10-CM

## 2019-01-03 DIAGNOSIS — M25.561 CHRONIC PAIN OF RIGHT KNEE: ICD-10-CM

## 2019-01-03 LAB — GLUCOSE BLD-MCNC: 114 MG/DL

## 2019-01-03 PROCEDURE — 99214 OFFICE O/P EST MOD 30 MIN: CPT | Performed by: FAMILY MEDICINE

## 2019-01-03 PROCEDURE — 82962 GLUCOSE BLOOD TEST: CPT | Performed by: FAMILY MEDICINE

## 2019-01-03 ASSESSMENT — ENCOUNTER SYMPTOMS
CHEST TIGHTNESS: 0
ABDOMINAL PAIN: 0
SHORTNESS OF BREATH: 0
CONSTIPATION: 0
COUGH: 0
NAUSEA: 0

## 2019-01-11 ENCOUNTER — CARE COORDINATION (OUTPATIENT)
Dept: CARE COORDINATION | Age: 59
End: 2019-01-11

## 2019-01-14 ENCOUNTER — HOSPITAL ENCOUNTER (OUTPATIENT)
Dept: PHYSICAL THERAPY | Age: 59
Setting detail: THERAPIES SERIES
Discharge: HOME | End: 2019-01-14
Payer: COMMERCIAL

## 2019-01-14 PROCEDURE — 97110 THERAPEUTIC EXERCISES: CPT

## 2019-01-14 PROCEDURE — 97162 PT EVAL MOD COMPLEX 30 MIN: CPT

## 2019-01-16 ENCOUNTER — HOSPITAL ENCOUNTER (OUTPATIENT)
Dept: PHYSICAL THERAPY | Age: 59
Setting detail: THERAPIES SERIES
Discharge: HOME | End: 2019-01-16
Payer: COMMERCIAL

## 2019-01-16 PROCEDURE — 97110 THERAPEUTIC EXERCISES: CPT

## 2019-01-16 PROCEDURE — 97140 MANUAL THERAPY 1/> REGIONS: CPT

## 2019-01-21 ENCOUNTER — HOSPITAL ENCOUNTER (OUTPATIENT)
Dept: PHYSICAL THERAPY | Age: 59
Setting detail: THERAPIES SERIES
Discharge: HOME | End: 2019-01-21
Payer: COMMERCIAL

## 2019-01-21 PROCEDURE — 97110 THERAPEUTIC EXERCISES: CPT

## 2019-01-21 PROCEDURE — 97140 MANUAL THERAPY 1/> REGIONS: CPT

## 2019-01-22 RX ORDER — DULAGLUTIDE 0.75 MG/.5ML
INJECTION, SOLUTION SUBCUTANEOUS
Qty: 2 ML | Refills: 3 | Status: SHIPPED | OUTPATIENT
Start: 2019-01-22 | End: 2019-04-23 | Stop reason: SDUPTHER

## 2019-01-23 ENCOUNTER — HOSPITAL ENCOUNTER (OUTPATIENT)
Dept: PHYSICAL THERAPY | Age: 59
Setting detail: THERAPIES SERIES
Discharge: HOME | End: 2019-01-23
Payer: COMMERCIAL

## 2019-01-23 PROCEDURE — 97140 MANUAL THERAPY 1/> REGIONS: CPT

## 2019-01-23 PROCEDURE — 97110 THERAPEUTIC EXERCISES: CPT

## 2019-01-28 ENCOUNTER — HOSPITAL ENCOUNTER (OUTPATIENT)
Dept: PHYSICAL THERAPY | Age: 59
Setting detail: THERAPIES SERIES
Discharge: HOME | End: 2019-01-28
Payer: COMMERCIAL

## 2019-01-30 ENCOUNTER — HOSPITAL ENCOUNTER (OUTPATIENT)
Dept: PHYSICAL THERAPY | Age: 59
Setting detail: THERAPIES SERIES
End: 2019-01-30
Payer: COMMERCIAL

## 2019-02-04 ENCOUNTER — HOSPITAL ENCOUNTER (OUTPATIENT)
Dept: PHYSICAL THERAPY | Age: 59
Setting detail: THERAPIES SERIES
Discharge: HOME | End: 2019-02-04
Payer: COMMERCIAL

## 2019-02-06 ENCOUNTER — HOSPITAL ENCOUNTER (OUTPATIENT)
Dept: PHYSICAL THERAPY | Age: 59
Setting detail: THERAPIES SERIES
Discharge: HOME | End: 2019-02-06
Payer: COMMERCIAL

## 2019-02-06 PROCEDURE — 97140 MANUAL THERAPY 1/> REGIONS: CPT

## 2019-02-06 PROCEDURE — 97110 THERAPEUTIC EXERCISES: CPT

## 2019-02-11 ENCOUNTER — HOSPITAL ENCOUNTER (OUTPATIENT)
Dept: PHYSICAL THERAPY | Age: 59
Setting detail: THERAPIES SERIES
End: 2019-02-11
Payer: COMMERCIAL

## 2019-02-13 ENCOUNTER — CARE COORDINATION (OUTPATIENT)
Dept: CARE COORDINATION | Age: 59
End: 2019-02-13

## 2019-02-13 ENCOUNTER — HOSPITAL ENCOUNTER (OUTPATIENT)
Dept: PHYSICAL THERAPY | Age: 59
Setting detail: THERAPIES SERIES
Discharge: HOME | End: 2019-02-13
Payer: COMMERCIAL

## 2019-02-13 PROCEDURE — 97140 MANUAL THERAPY 1/> REGIONS: CPT

## 2019-02-13 PROCEDURE — 97110 THERAPEUTIC EXERCISES: CPT

## 2019-02-18 ENCOUNTER — HOSPITAL ENCOUNTER (OUTPATIENT)
Dept: PHYSICAL THERAPY | Age: 59
Setting detail: THERAPIES SERIES
Discharge: HOME | End: 2019-02-18
Payer: COMMERCIAL

## 2019-02-18 ENCOUNTER — HOSPITAL ENCOUNTER (OUTPATIENT)
Age: 59
Setting detail: SPECIMEN
Discharge: HOME | End: 2019-02-18
Payer: COMMERCIAL

## 2019-02-18 DIAGNOSIS — E11.9 TYPE 2 DIABETES MELLITUS WITHOUT COMPLICATION, WITH LONG-TERM CURRENT USE OF INSULIN (HCC): ICD-10-CM

## 2019-02-18 DIAGNOSIS — Z79.4 TYPE 2 DIABETES MELLITUS WITHOUT COMPLICATION, WITH LONG-TERM CURRENT USE OF INSULIN (HCC): ICD-10-CM

## 2019-02-18 DIAGNOSIS — E78.5 DYSLIPIDEMIA: ICD-10-CM

## 2019-02-18 LAB
ALBUMIN SERPL-MCNC: 4.3 G/DL (ref 3.5–5.2)
ALBUMIN/GLOBULIN RATIO: 1.5 (ref 1–2.5)
ALP BLD-CCNC: 89 U/L (ref 35–104)
ALT SERPL-CCNC: 40 U/L (ref 5–33)
ANION GAP SERPL CALCULATED.3IONS-SCNC: 16 MMOL/L (ref 9–17)
AST SERPL-CCNC: 22 U/L
BILIRUB SERPL-MCNC: 0.28 MG/DL (ref 0.3–1.2)
BUN BLDV-MCNC: 19 MG/DL (ref 6–20)
BUN/CREAT BLD: ABNORMAL (ref 9–20)
CALCIUM SERPL-MCNC: 9.2 MG/DL (ref 8.6–10.4)
CHLORIDE BLD-SCNC: 100 MMOL/L (ref 98–107)
CHOLESTEROL/HDL RATIO: 3.7
CHOLESTEROL: 205 MG/DL
CO2: 24 MMOL/L (ref 20–31)
CREAT SERPL-MCNC: 0.92 MG/DL (ref 0.5–0.9)
ESTIMATED AVERAGE GLUCOSE: 183 MG/DL
GFR AFRICAN AMERICAN: >60 ML/MIN
GFR NON-AFRICAN AMERICAN: >60 ML/MIN
GFR SERPL CREATININE-BSD FRML MDRD: ABNORMAL ML/MIN/{1.73_M2}
GFR SERPL CREATININE-BSD FRML MDRD: ABNORMAL ML/MIN/{1.73_M2}
GLUCOSE BLD-MCNC: 166 MG/DL (ref 70–99)
HBA1C MFR BLD: 8 % (ref 4–6)
HDLC SERPL-MCNC: 55 MG/DL
LDL CHOLESTEROL: 102 MG/DL (ref 0–130)
POTASSIUM SERPL-SCNC: 4.4 MMOL/L (ref 3.7–5.3)
SODIUM BLD-SCNC: 140 MMOL/L (ref 135–144)
TOTAL PROTEIN: 7.2 G/DL (ref 6.4–8.3)
TRIGL SERPL-MCNC: 240 MG/DL
VLDLC SERPL CALC-MCNC: ABNORMAL MG/DL (ref 1–30)

## 2019-02-18 PROCEDURE — 97110 THERAPEUTIC EXERCISES: CPT

## 2019-02-18 PROCEDURE — 97140 MANUAL THERAPY 1/> REGIONS: CPT

## 2019-02-25 RX ORDER — GLIPIZIDE 10 MG/1
TABLET ORAL
Qty: 180 TABLET | Refills: 1 | Status: SHIPPED | OUTPATIENT
Start: 2019-02-25 | End: 2019-10-14 | Stop reason: SDUPTHER

## 2019-03-12 ENCOUNTER — OFFICE VISIT (OUTPATIENT)
Dept: FAMILY MEDICINE CLINIC | Age: 59
End: 2019-03-12
Payer: COMMERCIAL

## 2019-03-12 VITALS
BODY MASS INDEX: 35.47 KG/M2 | HEIGHT: 63 IN | DIASTOLIC BLOOD PRESSURE: 74 MMHG | HEART RATE: 75 BPM | TEMPERATURE: 97.5 F | WEIGHT: 200.2 LBS | SYSTOLIC BLOOD PRESSURE: 118 MMHG

## 2019-03-12 DIAGNOSIS — E78.5 DYSLIPIDEMIA: Primary | ICD-10-CM

## 2019-03-12 PROCEDURE — 99214 OFFICE O/P EST MOD 30 MIN: CPT | Performed by: FAMILY MEDICINE

## 2019-03-12 ASSESSMENT — ENCOUNTER SYMPTOMS
SHORTNESS OF BREATH: 0
CHEST TIGHTNESS: 0
COUGH: 0
DIARRHEA: 0
CONSTIPATION: 0

## 2019-03-12 ASSESSMENT — PATIENT HEALTH QUESTIONNAIRE - PHQ9
SUM OF ALL RESPONSES TO PHQ9 QUESTIONS 1 & 2: 0
2. FEELING DOWN, DEPRESSED OR HOPELESS: 0
1. LITTLE INTEREST OR PLEASURE IN DOING THINGS: 0
SUM OF ALL RESPONSES TO PHQ QUESTIONS 1-9: 0
SUM OF ALL RESPONSES TO PHQ QUESTIONS 1-9: 0

## 2019-03-29 ENCOUNTER — CARE COORDINATION (OUTPATIENT)
Dept: CARE COORDINATION | Age: 59
End: 2019-03-29

## 2019-04-09 RX ORDER — ROSUVASTATIN CALCIUM 10 MG/1
TABLET, COATED ORAL
Qty: 90 TABLET | Refills: 3 | Status: SHIPPED | OUTPATIENT
Start: 2019-04-09 | End: 2019-05-09 | Stop reason: SDUPTHER

## 2019-04-09 NOTE — TELEPHONE ENCOUNTER
Last visit:03/12/2019   Last Med refill:   Does patient have enough medication for 72 hours:     Next Visit Date:  Future Appointments   Date Time Provider Nicola Zaidi   6/18/2019  8:30 AM Omero Barnhart MD [address] [place] [address] Maintenance   Topic Date Due    Hepatitis B Vaccine (1 of 3 - Risk 3-dose series) 03/10/1980    Diabetic retinal exam  01/12/2017    Diabetic foot exam  12/01/2017    Shingles Vaccine (1 of 2) 02/27/2020 (Originally 3/10/2011)    Diabetic microalbuminuria test  11/26/2019    A1C test (Diabetic or Prediabetic)  02/18/2020    Lipid screen  02/18/2020    Potassium monitoring  02/18/2020    Creatinine monitoring  02/18/2020    Breast cancer screen  02/26/2020    DTaP/Tdap/Td vaccine (2 - Tdap) 12/15/2020    Colon cancer screen colonoscopy  09/20/2022    Flu vaccine  Completed    Pneumococcal 0-64 years Vaccine  Completed    Hepatitis C screen  Completed    HIV screen  Completed       Hemoglobin A1C (%)   Date Value   02/18/2019 8.0 (H)   10/17/2018 10.4   09/11/2018 10.3             ( goal A1C is < 7)   Microalb/Crt.  Ratio (mcg/mg creat)   Date Value   11/26/2018 CANNOT BE CALCULATED     LDL Cholesterol (mg/dL)   Date Value   02/18/2019 102   04/14/2018 56       (goal LDL is <100)   AST (U/L)   Date Value   02/18/2019 22     ALT (U/L)   Date Value   02/18/2019 40 (H)     BUN (mg/dL)   Date Value   02/18/2019 19     BP Readings from Last 3 Encounters:   03/12/19 118/74   01/03/19 104/70   09/11/18 131/73          (goal 120/80)    All Future Testing planned in [facility]  Lab Frequency Next Occurrence   Basic Metabolic Panel Once 86/68/7780   Hepatitis Panel, Acute Once 09/11/2019   ALT Once 04/12/2019               Patient Active Problem List:     Cervical spondylosis     Type II or unspecified type diabetes mellitus without mention of complication, not stated as uncontrolled     Hypertension     Abnormal auditory perception     Eustachian tube dysfunction     Chronic serous otitis media     Nasal polyps     Nasal congestion     Osteoarthritis of left knee     Osteoarthritis of right knee     DIEGO (nonalcoholic steatohepatitis)     Vitamin D deficiency     Iron deficiency anemia     Dyslipidemia     Diabetes mellitus type 2, uncontrolled (HCC)     Left hip pain     Trochanteric bursitis     Fatigue     Daytime somnolence     Snoring

## 2019-04-17 RX ORDER — ASPIRIN 81 MG/1
TABLET ORAL
Qty: 30 TABLET | Refills: 2 | Status: SHIPPED | OUTPATIENT
Start: 2019-04-17 | End: 2019-11-10 | Stop reason: SDUPTHER

## 2019-04-17 NOTE — TELEPHONE ENCOUNTER
mention of complication, not stated as uncontrolled     Hypertension     Abnormal auditory perception     Eustachian tube dysfunction     Chronic serous otitis media     Nasal polyps     Nasal congestion     Osteoarthritis of left knee     Osteoarthritis of right knee     DIEGO (nonalcoholic steatohepatitis)     Vitamin D deficiency     Iron deficiency anemia     Dyslipidemia     Diabetes mellitus type 2, uncontrolled (HCC)     Left hip pain     Trochanteric bursitis     Fatigue     Daytime somnolence     Snoring

## 2019-04-18 ENCOUNTER — TELEPHONE (OUTPATIENT)
Dept: PHARMACY | Age: 59
End: 2019-04-18

## 2019-04-18 NOTE — TELEPHONE ENCOUNTER
CLINICAL PHARMACY NOTE  Employee Diabetes Program     Patient is participating in [address] First attempt made to reach patient by telephone to schedule medication review with clinical pharmacist. Patient unavailable at this time and scheduled to complete visit over the phone on April 23rd at 10:00 AM at extension [phone].      Lee Andersen, PharmD  PGY-2 Ambulatory Care Pharmacy Resident  [facility] ([facility]) Medication Management  Phone: [phone]  4/18/2019 2:29 PM

## 2019-04-23 ENCOUNTER — TELEPHONE (OUTPATIENT)
Dept: PHARMACY | Age: 59
End: 2019-04-23

## 2019-04-23 RX ORDER — REPAGLINIDE 1 MG/1
TABLET ORAL
Qty: 90 TABLET | Refills: 2 | Status: SHIPPED | OUTPATIENT
Start: 2019-04-23 | End: 2019-06-17 | Stop reason: SDUPTHER

## 2019-04-23 NOTE — TELEPHONE ENCOUNTER
Patient is currently enrolled in [employer] . After the patient's recent visit with a Khloe R RICCO De [place] Se, the below were identified as opportunities to assist with their diabetes management. · Combining metformin and Jardiance to reduce pill burden  · Refills for Trulicity and repaglinide    See pharmacist note dated 4/23/19 for complete details.      Yesica Rios, PharmD  PGY-2 Ambulatory Care Pharmacy Resident  [facility] Medication Management  Phone: [phone]  4/23/2019 10:39 AM

## 2019-04-23 NOTE — TELEPHONE ENCOUNTER
[facility]) Employee Diabetes Program  =================================================================  Herb Mackey is a 63 y.o. female enrolled in the [address] [place] [address] Diabetes Program.    Medications:  Current Outpatient Medications   Medication Sig Dispense Refill    ASPIRIN ADULT LOW STRENGTH 81 MG EC tablet TAKE 1 TABLET BY MOUTH ONE TIME DAILY 30 tablet 2    metFORMIN (GLUCOPHAGE) 1000 MG tablet TAKE 1 TABLET BY MOUTH TWO TIMES A DAY WITH MEALS 180 tablet 0    rosuvastatin (CRESTOR) 10 MG tablet TAKE 1 TABLET BY MOUTH ONE TIME A DAY 90 tablet 3    glipiZIDE (GLUCOTROL) 10 MG tablet TAKE 1 TABLET BY MOUTH 2 TIMES A DAY BEFORE MEALS(REPLACES GLYBURIDE) 180 tablet 1    TRULICITY 5.78 GZ/3.0JY SOPN INJECT THE CONTENTS OF ONE SYRINGE UNDER THE SKIN ONCE WEEKLY 2 mL 3    repaglinide (PRANDIN) 1 MG tablet TAKE 1 TABLET BY MOUTH 3 TIMES A DAY BEFORE MEALS 90 tablet 2    JARDIANCE 10 MG tablet TAKE 1 TABLET BY MOUTH ONE TIME A DAY 30 tablet 3    lisinopril (PRINIVIL;ZESTRIL) 40 MG tablet Take 1 tablet by mouth daily 90 tablet 3    chlorthalidone (HYGROTON) 25 MG tablet Take 1 tablet by mouth daily 90 tablet 3    rOPINIRole (REQUIP) 2 MG tablet TAKE (2) TABLETS BY MOUTH ONCE DAILY AT NIGHT. 180 tablet 3    AGAMATRIX ULTRA-THIN LANCETS MISC Use as directed to test up to 1 time daily 100 each 3    Blood Glucose Monitoring Suppl (AGAMATRIX PRESTO) w/Device KIT Use as directed to test up to 1 time daily 1 kit 0    blood glucose test strips (AGAMATRIX PRESTO TEST) strip 1 each by In Vitro route daily As needed.  100 each 3    glipiZIDE (GLUCOTROL) 10 MG tablet Take 1 tablet by mouth 2 times daily (before meals) (replaces glyburide) 180 tablet 1    busPIRone (BUSPAR) 10 MG tablet TAKE ONE TABLET BY MOUTH Three  A  tablet 3    Calcium Carbonate-Vitamin D (OYSTER SHELL CALCIUM/D) 500-200 MG-UNIT TABS Take 2 tablets by mouth daily 180 tablet 5    omeprazole (PRILOSEC) 20 MG delayed release capsule TAKE (1) CAPSULE BY MOUTH TWICE DAILY. 180 capsule 3     No current facility-administered medications for this visit. Current Pharmacy: Mail Order pharmacy/ [facility]  Current testing supplies/frequency: Agamatrix, once daily  Pen needles/syringes: None    Allergies: Allergies   Allergen Reactions    Codeine Nausea And Vomiting      Vitals/Labs:  BP Readings from Last 3 Encounters:   03/12/19 118/74   01/03/19 104/70   09/11/18 131/73     Lab Results   Component Value Date    LABMICR CANNOT BE CALCULATED 11/26/2018     Lab Results   Component Value Date    LABA1C 8.0 (H) 02/18/2019    LABA1C 10.4 10/17/2018    LABA1C 10.3 09/11/2018     Lab Results   Component Value Date    CHOL 205 (H) 02/18/2019    TRIG 240 (H) 02/18/2019    HDL 55 02/18/2019    LDLCHOLESTEROL 102 02/18/2019    LDLDIRECT 156 (H) 05/20/2017     ALT   Date Value Ref Range Status   02/18/2019 40 (H) 5 - 33 U/L Final     AST   Date Value Ref Range Status   02/18/2019 22 <32 U/L Final     The 10-year ASCVD risk score (Dean Menon, et al., 2013) is: 6%    Values used to calculate the score:      Age: 63 years      Sex: Female      Is Non- : No      Diabetic: Yes      Tobacco smoker: No      Systolic Blood Pressure: 906 mmHg      Is BP treated: Yes      HDL Cholesterol: 55 mg/dL      Total Cholesterol: 205 mg/dL     Lab Results   Component Value Date    CREATININE 0.92 (H) 02/18/2019     Estimated Creatinine Clearance: 71 mL/min (A) (based on SCr of 0.92 mg/dL (H)).   eGFR: >60 mL/min/1.73 m^2    Immunizations:  Immunization History   Administered Date(s) Administered    DTaP 12/15/2010    Influenza Virus Vaccine 12/06/2013, 11/27/2015    Influenza, Nancy Dashawn, 3 yrs and older, IM, PF (Fluzone 3 yrs and older or Afluria 5 yrs and older) 11/04/2016, 11/16/2018    Influenza, Nancy Dashawn, 6 mo and older, IM (Flulaval) 10/31/2017    Pneumococcal Polysaccharide ([id number]) 11/27/2015      Social History:  Social History Tobacco Use    Smoking status: Never Smoker    Smokeless tobacco: Never Used   Substance Use Topics    Alcohol use: Yes     Alcohol/week: 0.0 oz     Comment: rarely/ 4 times a year     ASSESSMENT:  Initial Program Requirements (Y indicates has completed for the year, N indicates needs to be completed by 7/1/19): Yes - OV with provider for DM (1st)  Yes - A1c (1st)  Yes - On statin or contraindication(s)   Yes - On ACEi/ARB or contraindication(s)       Ongoing Program Requirements (Y indicates has completed for the year, N indicates needs to be completed by 12/31/19): No - OV with provider for DM (2nd)  Yes - ACC/diabetes educator visit (if A1c over 8%)  No - A1c (2nd)  Yes - Lipid panel  No - Urine microalbumin  Yes - Pneumococcal vaccination  No - Influenza vaccination for Fall 2019  No - Medication adherence over 70%    Formulary Medication Review:  Non-formulary or medications with cost-effective alternatives: None. Current medications eligible for copay waiver, up to $600, through [facility]) (mail order) Pharmacy:  - Glipizide  - Jardiance  - Metformin  - Trulicity  - Repaglinide  - Lisinopril  - Aspirin  - Rosuvastatin  - Generic ([employer] pharmacy-stocked) insulin syringes and pen needles  - Agamatrix or Prodigy meter and supplies     Diabetes Care:   - Glycemic Goal: <7.0%. Is not at blood glucose goal but is on 5 anti-diabetic therapy. - Duplicate MOA: None  - Reduce Pill [place]: Combination metformin + Jardiance or glipizide available  - Appropriateness of Insulin Therapy: N/A  - Therapy Optimization: N/A  - De-escalation of Therapy: None  - Adherent to ACEi/ARB and/or statin: Yes    Other Considerations:  - Blood Pressure Goal: BP less than 140/90 mmHg due to history of DM: Is at blood pressure goal.   - Lipids: Patient is prescribed moderate-intensity statin therapy.   - Smoking status: never smoked  - Other: Patient needs refills on testing supplies, Trulicity, and repaglinide    PLAN:  -

## 2019-04-26 ENCOUNTER — TELEPHONE (OUTPATIENT)
Dept: FAMILY MEDICINE CLINIC | Age: 59
End: 2019-04-26

## 2019-04-26 RX ORDER — BLOOD-GLUCOSE CONTROL, LOW
1 EACH MISCELLANEOUS 3 TIMES DAILY
Qty: 100 EACH | Refills: 3 | Status: SHIPPED | OUTPATIENT
Start: 2019-04-26

## 2019-04-26 RX ORDER — BLOOD-GLUCOSE METER
1 EACH MISCELLANEOUS 3 TIMES DAILY
Qty: 1 KIT | Refills: 0 | Status: SHIPPED | OUTPATIENT
Start: 2019-04-26

## 2019-04-26 NOTE — TELEPHONE ENCOUNTER
Pharmacy called stating that the patient called them asking about the prodigy diabetic supplies. Patient would like this to be filled. The pharmacy would need new scripts for prodigy monitor, testing strips, how many times a day to test as well as any refills.  Please advise

## 2019-05-07 RX ORDER — CYCLOBENZAPRINE HCL 5 MG
5 TABLET ORAL 2 TIMES DAILY PRN
Qty: 20 TABLET | Refills: 0 | Status: SHIPPED | OUTPATIENT
Start: 2019-05-07 | End: 2019-05-17

## 2019-05-09 NOTE — TELEPHONE ENCOUNTER
otitis media     Nasal polyps     Nasal congestion     Osteoarthritis of left knee     Osteoarthritis of right knee     DIEGO (nonalcoholic steatohepatitis)     Vitamin D deficiency     Iron deficiency anemia     Dyslipidemia     Diabetes mellitus type 2, uncontrolled (HCC)     Left hip pain     Trochanteric bursitis     Fatigue     Daytime somnolence     Snoring

## 2019-05-10 RX ORDER — ROSUVASTATIN CALCIUM 10 MG/1
TABLET, COATED ORAL
Qty: 90 TABLET | Refills: 3 | Status: SHIPPED
Start: 2019-05-10 | End: 2020-09-29 | Stop reason: DRUGHIGH

## 2019-05-29 ENCOUNTER — CARE COORDINATION (OUTPATIENT)
Dept: CARE COORDINATION | Age: 59
End: 2019-05-29

## 2019-05-29 NOTE — CARE COORDINATION
VM left for [facility] requesting a return call to [phone]. CC would like to discuss a \"refresher\" visit to DM Ed. Kavya's A1c on 2.18.19 was 8. Plan:  Meet [facility] at her appointment with her PCP on 6.18.19 if no return call.

## 2019-06-07 ENCOUNTER — CARE COORDINATION (OUTPATIENT)
Dept: CARE COORDINATION | Age: 59
End: 2019-06-07

## 2019-06-07 NOTE — CARE COORDINATION
Ambulatory Care Coordination Note  6/7/2019  CM Risk Score: 5  Naomi Mortality Risk Score:      ACC: Donelle Ormond    Summary Note: Tin Sahni returns CC's VM after seeing CC in the main lobby this morning. She reports she has been very busy assisting with moving her mother in law to assisted living, cleaning out her home and getting the house sold. CC asked what we could do to assist her in getting her A1c under 8. She reports she needs to \"become more compliant\". She states during the time of assisting with her mother in [facility] move and home sale she was \"eating a lot of fast food and pizza\". She was also not eating her meals at regular times. She is not checking her blood sugars. CC discussed a yearly \"refresher\" with DM Ed. CC stressed Tin Sahni to discuss how to eat healthier at a fast food restaurant. She will then have the working knowledge for future reference. She was agreeable to this. Kavya kennyyped JUAN PABLO while typing the encounter that she left a VM on the DM Ed voice mail requesting a return call to schedule. Plan:  Meet Tin Sahni at her appointment on 6.18.19. Care Coordination Interventions    Program Enrollment:  Rising Risk  Referral from Primary Care Provider:  No  Suggested Interventions and Community Resources  Diabetes Education:  Completed         Goals Addressed     None          Prior to Admission medications    Medication Sig Start Date End Date Taking? Authorizing Provider   rosuvastatin (CRESTOR) 10 MG tablet TAKE 1 TABLET BY MOUTH ONE TIME A DAY 5/10/19   MD PRESTON Wong LANCETS 28G MISC 1 Bottle by Does not apply route three times daily 4/26/19   Yady Matamoros MD   Blood Glucose Monitoring Suppl (PRODIGY AUTOCODE BLOOD GLUCOSE) w/Device KIT 1 Device by Does not apply route 3 times daily 4/26/19   Yady Matamoros MD   blood glucose test strips (PRODIGY NO CODING BLOOD GLUC) strip 1 each by In Vitro route daily As needed.  4/26/19   Yady Matamoros MD   Dulaglutide (TRULICITY) 4.14 MG/0.5ML SOPN INJECT THE CONTENTS OF ONE SYRINGE UNDER THE SKIN ONCE WEEKLY 4/23/19   Igor Orona MD   repaglinide (PRANDIN) 1 MG tablet TAKE 1 TABLET BY MOUTH 3 TIMES A DAY BEFORE MEALS 4/23/19   Igor Orona MD   Empagliflozin-metFORMIN HCl [facility]) 5-1000 MG TABS Take 1 tablet by mouth 2 times daily 4/23/19   Igor Orona MD   ASPIRIN ADULT LOW STRENGTH 81 MG EC tablet TAKE 1 TABLET BY MOUTH ONE TIME DAILY 4/17/19   Igor Orona MD   metFORMIN (GLUCOPHAGE) 1000 MG tablet TAKE 1 TABLET BY MOUTH TWO TIMES A DAY WITH MEALS 4/9/19   Igor Orona MD   glipiZIDE (GLUCOTROL) 10 MG tablet TAKE 1 TABLET BY MOUTH 2 TIMES A DAY BEFORE MEALS(REPLACES GLYBURIDE) 2/25/19   Igor Orona MD   JARDIANCE 10 MG tablet TAKE 1 TABLET BY MOUTH ONE TIME A DAY 1/2/19   Igor Orona MD   lisinopril (PRINIVIL;ZESTRIL) 40 MG tablet Take 1 tablet by mouth daily 12/3/18 11/28/19  Igor Orona MD   chlorthalidone (HYGROTON) 25 MG tablet Take 1 tablet by mouth daily 12/3/18   Igor Orona MD   rOPINIRole (REQUIP) 2 MG tablet TAKE (2) TABLETS BY MOUTH ONCE DAILY AT NIGHT. 11/13/18   Igor Orona MD   AGAMATRIX ULTRA-THIN LANCETS MISC Use as directed to test up to 1 time daily 10/24/18   Igor Orona MD   Blood Glucose Monitoring Suppl (AGAMATRIX PRESTO) w/Device KIT Use as directed to test up to 1 time daily 10/24/18   Igor Orona MD   glipiZIDE (GLUCOTROL) 10 MG tablet Take 1 tablet by mouth 2 times daily (before meals) (replaces glyburide) 10/24/18   Igor Orona MD   busPIRone (BUSPAR) 10 MG tablet TAKE ONE TABLET BY MOUTH Three  A DAY 9/11/18   Igor Orona MD   Calcium Carbonate-Vitamin D (OYSTER SHELL CALCIUM/D) 500-200 MG-UNIT TABS Take 2 tablets by mouth daily 2/13/18 3/12/19  Igor Orona MD   omeprazole (PRILOSEC) 20 MG delayed release capsule TAKE (1) CAPSULE BY MOUTH TWICE DAILY.  2/8/18   Igor Orona MD       Future Appointments   Date Time Provider Nicola Zaidi   6/18/2019 8:30 AM Reanna Appiah MD [address] [facility]

## 2019-06-11 DIAGNOSIS — E11.9 TYPE 2 DIABETES MELLITUS WITHOUT COMPLICATION, WITHOUT LONG-TERM CURRENT USE OF INSULIN (HCC): Primary | ICD-10-CM

## 2019-06-17 NOTE — TELEPHONE ENCOUNTER
Please address the medication refill and close the encounter. If I can be of assistance, please route to the applicable pool. Thank you. Last visit: [id number]  Last Med refill: [id number]  Does patient have enough medication for 72 hours:   Next Visit Date:  Future Appointments   Date Time Provider Nicola Zaidi   6/18/2019  8:30 AM Abelardo Walker MD [address] [place] [address] Maintenance   Topic Date Due    Hepatitis B Vaccine (1 of 3 - Risk 3-dose series) 03/10/1980    Diabetic retinal exam  01/12/2017    Diabetic foot exam  12/01/2017    Shingles Vaccine (1 of 2) 02/27/2020 (Originally 3/10/2011)    Diabetic microalbuminuria test  11/26/2019    A1C test (Diabetic or Prediabetic)  02/18/2020    Lipid screen  02/18/2020    Potassium monitoring  02/18/2020    Creatinine monitoring  02/18/2020    Breast cancer screen  02/26/2020    DTaP/Tdap/Td vaccine (2 - Tdap) 12/15/2020    Colon cancer screen colonoscopy  09/20/2022    Flu vaccine  Completed    Pneumococcal 0-64 years Vaccine  Completed    Hepatitis C screen  Completed    HIV screen  Completed       Hemoglobin A1C (%)   Date Value   02/18/2019 8.0 (H)   10/17/2018 10.4   09/11/2018 10.3             ( goal A1C is < 7)   Microalb/Crt.  Ratio (mcg/mg creat)   Date Value   11/26/2018 CANNOT BE CALCULATED     LDL Cholesterol (mg/dL)   Date Value   02/18/2019 102   04/14/2018 56       (goal LDL is <100)   AST (U/L)   Date Value   02/18/2019 22     ALT (U/L)   Date Value   02/18/2019 40 (H)     BUN (mg/dL)   Date Value   02/18/2019 19     BP Readings from Last 3 Encounters:   03/12/19 118/74   01/03/19 104/70   09/11/18 131/73          (goal 120/80)    All Future Testing planned in [facility]  Lab Frequency Next Occurrence   Basic Metabolic Panel Once 49/83/6728   Hepatitis Panel, Acute Once 09/11/2019   ALT Once 03/12/2020               Patient Active Problem List:     Cervical spondylosis     Type II or unspecified type diabetes mellitus without mention of complication, not stated as uncontrolled     Hypertension     Abnormal auditory perception     Eustachian tube dysfunction     Chronic serous otitis media     Nasal polyps     Nasal congestion     Osteoarthritis of left knee     Osteoarthritis of right knee     DIEGO (nonalcoholic steatohepatitis)     Vitamin D deficiency     Iron deficiency anemia     Dyslipidemia     Diabetes mellitus type 2, uncontrolled (HCC)     Left hip pain     Trochanteric bursitis     Fatigue     Daytime somnolence     Snoring

## 2019-06-18 ENCOUNTER — CARE COORDINATION (OUTPATIENT)
Dept: CARE COORDINATION | Age: 59
End: 2019-06-18

## 2019-06-18 ENCOUNTER — OFFICE VISIT (OUTPATIENT)
Dept: FAMILY MEDICINE CLINIC | Age: 59
End: 2019-06-18
Payer: COMMERCIAL

## 2019-06-18 VITALS
WEIGHT: 197.2 LBS | HEART RATE: 72 BPM | HEIGHT: 63 IN | BODY MASS INDEX: 34.94 KG/M2 | TEMPERATURE: 97.2 F | SYSTOLIC BLOOD PRESSURE: 105 MMHG | DIASTOLIC BLOOD PRESSURE: 67 MMHG

## 2019-06-18 DIAGNOSIS — Z00.00 HEALTH CARE MAINTENANCE: ICD-10-CM

## 2019-06-18 DIAGNOSIS — E78.5 DYSLIPIDEMIA: Primary | ICD-10-CM

## 2019-06-18 LAB — HBA1C MFR BLD: 7.8 %

## 2019-06-18 PROCEDURE — 83036 HEMOGLOBIN GLYCOSYLATED A1C: CPT | Performed by: FAMILY MEDICINE

## 2019-06-18 PROCEDURE — 99214 OFFICE O/P EST MOD 30 MIN: CPT | Performed by: FAMILY MEDICINE

## 2019-06-18 PROCEDURE — 99211 OFF/OP EST MAY X REQ PHY/QHP: CPT | Performed by: FAMILY MEDICINE

## 2019-06-18 RX ORDER — EZETIMIBE 10 MG/1
10 TABLET ORAL DAILY
Qty: 90 TABLET | Refills: 3 | Status: SHIPPED | OUTPATIENT
Start: 2019-06-18 | End: 2021-02-04

## 2019-06-18 RX ORDER — BUSPIRONE HYDROCHLORIDE 10 MG/1
TABLET ORAL
Qty: 180 TABLET | Refills: 0 | Status: SHIPPED | OUTPATIENT
Start: 2019-06-18 | End: 2019-08-20 | Stop reason: SDUPTHER

## 2019-06-18 RX ORDER — REPAGLINIDE 1 MG/1
TABLET ORAL
Qty: 90 TABLET | Refills: 2 | Status: SHIPPED | OUTPATIENT
Start: 2019-06-18 | End: 2020-03-16

## 2019-06-18 ASSESSMENT — ENCOUNTER SYMPTOMS
ABDOMINAL PAIN: 0
CHEST TIGHTNESS: 0
CONSTIPATION: 0
DIARRHEA: 0
SHORTNESS OF BREATH: 0
COUGH: 0
WHEEZING: 0

## 2019-06-18 NOTE — PROGRESS NOTES
2019     Leandro Grady (:  1960) is a 63 y.o. female, here for evaluation of the following medical concerns:    HPI  Treatment Adherence:   Medication compliance:  compliant most of the time  Diet compliance:  noncompliant: eats all over  Weight trend: stable  Current exercise: no regular exercise  Barriers: lack of motivation    Diabetes Mellitus Type 2: Current symptoms/problems include none. Home blood sugar records: patient does not test  Any episodes of hypoglycemia? no  Eye exam current (within one year): yes  Tobacco history: She  reports that she has never smoked. She has never used smokeless tobacco.   Daily Aspirin? Yes    Hypertension:  Home blood pressure monitoring: No.  She is adherent to a low sodium diet. Patient denies chest pain, shortness of breath, headache, lightheadedness, blurred vision, peripheral edema, palpitations, dry cough and fatigue. Antihypertensive medication side effects: no medication side effects noted. Use of agents associated with hypertension: none. Hyperlipidemia:  No new myalgias or GI upset on rosuvastatin (Crestor). Lab Results   Component Value Date    LABA1C 8.0 (H) 2019    LABA1C 10.4 10/17/2018    LABA1C 10.3 2018     Lab Results   Component Value Date    LABMICR CANNOT BE CALCULATED 2018    CREATININE 0.92 (H) 2019     Lab Results   Component Value Date    ALT 40 (H) 2019    AST 22 2019     Lab Results   Component Value Date    CHOL 205 (H) 2019    TRIG 240 (H) 2019    HDL 55 2019    LDLDIRECT 156 (H) 2017          Review of Systems   Constitutional: Negative for activity change and appetite change. Respiratory: Negative for cough, chest tightness, shortness of breath and wheezing. Cardiovascular: Negative for chest pain and palpitations. Gastrointestinal: Negative for abdominal pain, constipation and diarrhea. Genitourinary: Negative for difficulty urinating. Neurological: Negative for dizziness and light-headedness. Prior to Visit Medications    Medication Sig Taking? Authorizing Provider   rosuvastatin (CRESTOR) 10 MG tablet TAKE 1 TABLET BY MOUTH ONE TIME A DAY Yes MD PRESTON Fermin LANCETS 28G MISC 1 Bottle by Does not apply route three times daily Yes Eleni Warner MD   Blood Glucose Monitoring Suppl (PRODIGY AUTOCODE BLOOD GLUCOSE) w/Device KIT 1 Device by Does not apply route 3 times daily Yes Eleni Warner MD   blood glucose test strips (PRODIGY NO CODING BLOOD GLUC) strip 1 each by In Vitro route daily As needed. Yes Eleni Warner MD   Dulaglutide (TRULICITY) 3.23 ZK/3.8WS SOPN INJECT THE CONTENTS OF ONE SYRINGE UNDER THE SKIN ONCE WEEKLY Yes Eleni Warner MD   repaglinide (PRANDIN) 1 MG tablet TAKE 1 TABLET BY MOUTH 3 TIMES A DAY BEFORE MEALS Yes Eleni Warner MD   Empagliflozin-metFORMIN HCl (SYNJARDY) 5-1000 MG TABS Take 1 tablet by mouth 2 times daily Yes Eleni Warner MD   ASPIRIN ADULT LOW STRENGTH 81 MG EC tablet TAKE 1 TABLET BY MOUTH ONE TIME DAILY Yes Eleni Warner MD   metFORMIN (GLUCOPHAGE) 1000 MG tablet TAKE 1 TABLET BY MOUTH TWO TIMES A DAY WITH MEALS Yes Eleni Warner MD   glipiZIDE (GLUCOTROL) 10 MG tablet TAKE 1 TABLET BY MOUTH 2 TIMES A DAY BEFORE MEALS(REPLACES GLYBURIDE) Yes Eleni Warner MD   JARDIANCE 10 MG tablet TAKE 1 TABLET BY MOUTH ONE TIME A DAY Yes Eleni Warner MD   lisinopril (PRINIVIL;ZESTRIL) 40 MG tablet Take 1 tablet by mouth daily Yes Eleni Warner MD   chlorthalidone (HYGROTON) 25 MG tablet Take 1 tablet by mouth daily Yes Eleni Warner MD   rOPINIRole (REQUIP) 2 MG tablet TAKE (2) TABLETS BY MOUTH ONCE DAILY AT NIGHT.  Yes MD Jason Fermin ULTRA-THIN LANCETS MISC Use as directed to test up to 1 time daily Yes Eleni Warner MD   Blood Glucose Monitoring Suppl (AGAMATRIX PRESTO) w/Device KIT Use as directed to test up to 1 time daily Yes Eleni Warner MD   glipiZIDE (GLUCOTROL) 10 MG tablet Take 1 tablet by mouth 2 times daily (before meals) (replaces glyburide) Yes Carina Freed MD   busPIRone (BUSPAR) 10 MG tablet TAKE ONE TABLET BY MOUTH Three  A DAY Yes Carina Freed MD   Calcium Carbonate-Vitamin D (OYSTER SHELL CALCIUM/D) 500-200 MG-UNIT TABS Take 2 tablets by mouth daily Yes Carina Freed MD   omeprazole (PRILOSEC) 20 MG delayed release capsule TAKE (1) CAPSULE BY MOUTH TWICE DAILY. Yes Carina Freed MD        Social History     Tobacco Use    Smoking status: Never Smoker    Smokeless tobacco: Never Used   Substance Use Topics    Alcohol use: Yes     Alcohol/week: 0.0 oz     Comment: rarely/ 4 times a year        Vitals:    06/18/19 0839   BP: 105/67   Site: Left Upper Arm   Position: Sitting   Cuff Size: Large Adult   Pulse: 72   Temp: 97.2 °F (36.2 °C)   TempSrc: Temporal   Weight: 197 lb 3.2 oz (89.4 kg)   Height: 5' 2.99\" (1.6 m)     Estimated body mass index is 34.94 kg/m² as calculated from the following:    Height as of this encounter: 5' 2.99\" (1.6 m). Weight as of this encounter: 197 lb 3.2 oz (89.4 kg). Physical Exam   Constitutional: She appears well-developed and well-nourished. Neck: No thyromegaly present. Cardiovascular: Normal rate, regular rhythm and normal heart sounds. Pulmonary/Chest: Effort normal and breath sounds normal.   Abdominal: Soft. She exhibits no distension. Musculoskeletal: She exhibits deformity. Nursing note and vitals reviewed. ASSESSMENT/PLAN:  DM- well controlled A1c 7.8/ Decrease Glipizide to once at bed time./Eye exam done 1 week ago/Foot exam done in April. Diabetic education. HTN- well controlled  Dyslipidemia- add Zetia 10 mg elevated tg  Hep B shot given    No follow-ups on file. An electronic signature was used to authenticate this note.     --Carina Freed MD on 6/18/2019 at 8:59 AM

## 2019-06-18 NOTE — PATIENT INSTRUCTIONS
Thank you for letting us take care of you today. We hope all your questions were addressed. If a question was overlooked or something else comes to mind after you return home, please contact a member of your Care Team listed below. Please make sure you have a routine office visit set up to follow-up on [address]. Your Care Team at [place] [address] is Team #1  Jennifer Strickland MD (Faculty)  Cosmo Flanagan MD (Faculty)  Steffen Morris MD (Resident)  Cale López MD (Resident)  Rachele Perez MD (Resident)  Jefferson Bardales MD (Resident)  Berniece Primrose., [facility]  Miryam Melendez., A  Harsh Rosa., [facility] office)  [facility])  Keara Pedraza [facility] office)  SHERRY Jaimes RMA ([address] [facility])  Lila Tirado, Ph.D., (Behavioral Services)  Kalpana Sánchez [facility] (Clinical Pharmacist)     Office phone number: [phone]    If you need to get in right away due to illness, please be advised we have \"Same Day\" appointments available Monday-Friday. Please call us at [phone] option #3 to schedule your \"Same Day\" appointment.

## 2019-06-18 NOTE — PROGRESS NOTES
DIABETES and HYPERTENSION visit    BP Readings from Last 3 Encounters:   03/12/19 118/74   01/03/19 104/70   09/11/18 131/73        Hemoglobin A1C (%)   Date Value   02/18/2019 8.0 (H)   10/17/2018 10.4   09/11/2018 10.3     Microalb/Crt. Ratio (mcg/mg creat)   Date Value   11/26/2018 CANNOT BE CALCULATED     LDL Cholesterol (mg/dL)   Date Value   02/18/2019 102     HDL (mg/dL)   Date Value   02/18/2019 55     BUN (mg/dL)   Date Value   02/18/2019 19     CREATININE (mg/dL)   Date Value   02/18/2019 0.92 (H)     Glucose (mg/dL)   Date Value   02/18/2019 166 (H)   05/19/2012 137 (H)            Have you changed or started any medications since your last visit including any over-the-counter medicines, vitamins, or herbal medicines? no   Have you stopped taking any of your medications? Is so, why? -  no  Are you having any side effects from any of your medications? - no    Have you seen any other physician or provider since your last visit? yes - podiatry (dr Hilda Goldstein) , eye ( dr Moustapha Bernabe)   Have you had any other diagnostic tests since your last visit?  no   Have you been seen in the emergency room and/or had an admission in a hospital since we last saw you?  no   Have you had your routine dental cleaning in the past 6 months?  yes      Have you had your annual diabetic retinal (eye) exam? Yes   (ensure copy of exam is in the chart)    Do you have an active MyChart account? If no, what is the barrier?   Yes    Patient Care Team:  Chloe Piper MD as PCP - General (Family Medicine)  Chloe Piper MD as PCP - [facility]  Coral Gannon MD as Consulting Physician (Gastroenterology)  Dacia Padilla as Care Coordinator    Medical History Review  Past Medical, Family, and Social History reviewed and does contribute to the patient presenting condition    Health Maintenance   Topic Date Due    Hepatitis B Vaccine (1 of 3 - Risk 3-dose series) 03/10/1980    Diabetic retinal exam  01/12/2017    Diabetic foot exam 12/01/2017    Shingles Vaccine (1 of 2) 02/27/2020 (Originally 3/10/2011)    Diabetic microalbuminuria test  11/26/2019    A1C test (Diabetic or Prediabetic)  02/18/2020    Lipid screen  02/18/2020    Potassium monitoring  02/18/2020    Creatinine monitoring  02/18/2020    Breast cancer screen  02/26/2020    DTaP/Tdap/Td vaccine (2 - Tdap) 12/15/2020    Colon cancer screen colonoscopy  09/20/2022    Flu vaccine  Completed    Pneumococcal 0-64 years Vaccine  Completed    Hepatitis C screen  Completed    HIV screen  Completed

## 2019-06-18 NOTE — LETTER
6/18/2019    Jaspal Oas  66 [birth year]2 Osmin Velasquez 87     Congratulations on the progress you have made improving and taking charge of your health! Your recent follow up with Jenna Miner MD finds you doing well and are no longer in need of Care Coordination services. I know you will continue to use the knowledge and tools you have gained to continue down a healthy path. As you have demonstrated that you are able to successfully manage your health and wellness, I will no longer contact you on a regular basis. Again, congratulations and please know if there are any changes or you have a need for my services in the future, you may always contact me for questions or concerns.   In good health,       Danis Elam RN

## 2019-06-18 NOTE — CARE COORDINATION
Ambulatory Care Coordination Note  6/18/2019  CM Risk Score: 5  Naomi Mortality Risk Score:      ACC: Jaqueline Richardson    Summary Note: CC met with Blanca Li and her PCP. Blanca Li is not checking her blood sugars as requested. She reports she did get a VM from DM Ed to schedule a refresher for DM. She states she will return a call to get scheduled. She has lost another 3# in 3 months. She is not exercising regularly. Her A1c in the office today is 7.8. This is down from 8 on 2.18.19. Her medications were adjusted today. She expresses an understanding. Plan:  Graduate from care coordination. Dr. David Esquivel is agreeable. Care Coordination Interventions    Program Enrollment:  Rising Risk  Referral from Primary Care Provider:  No  Suggested Interventions and Community Resources  Diabetes Education:  Completed         Goals Addressed     None          Prior to Admission medications    Medication Sig Start Date End Date Taking?  Authorizing Provider   metFORMIN (GLUCOPHAGE) 1000 MG tablet TAKE 1 TABLET BY MOUTH 2 TIMES A DAY WITH MEALS 6/18/19   Junaid Zhao MD   busPIRone (BUSPAR) 10 MG tablet TAKE 1 TABLET BY MOUTH 3 TIMES A DAY 6/18/19   Junaid Zhao MD   Dulaglutide (TRULICITY) 6.41 AF/4.1QP SOPN INJECT THE CONTENTS OF ONE SYRINGE UNDER THE SKIN ONCE WEEKLY 6/18/19   Junaid Zhao MD   repaglinide (PRANDIN) 1 MG tablet TAKE 1 TABLET BY MOUTH 3 TIMES A DAY BEFORE MEALS 6/18/19   Junaid Zhao MD   ezetimibe (ZETIA) 10 MG tablet Take 1 tablet by mouth daily 6/18/19   Junaid Zhao MD   rosuvastatin (CRESTOR) 10 MG tablet TAKE 1 TABLET BY MOUTH ONE TIME A DAY 5/10/19   MD PRE[facility]ON [facility] LANCETS 28G MISC 1 Bottle by Does not apply route three times daily 4/26/19   Junaid Zhao MD   Blood Glucose Monitoring Suppl (PRODIGY AUTOCODE BLOOD GLUCOSE) w/Device KIT 1 Device by Does not apply route 3 times daily 4/26/19   Junaid Zhao MD   blood glucose test strips (PRODIGY NO CODING BLOOD GLUC) strip 1

## 2019-06-24 ENCOUNTER — EMPLOYEE WELLNESS (OUTPATIENT)
Dept: OTHER | Age: 59
End: 2019-06-24

## 2019-06-24 LAB
CHOLESTEROL/HDL RATIO: 2.4
CHOLESTEROL: 137 MG/DL
ESTIMATED AVERAGE GLUCOSE: 194 MG/DL
GLUCOSE BLD-MCNC: 203 MG/DL (ref 70–99)
HBA1C MFR BLD: 8.4 % (ref 4–6)
HDLC SERPL-MCNC: 57 MG/DL
LDL CHOLESTEROL: 42 MG/DL (ref 0–130)
PATIENT FASTING?: YES
TRIGL SERPL-MCNC: 190 MG/DL
VLDLC SERPL CALC-MCNC: ABNORMAL MG/DL (ref 1–30)

## 2019-06-26 ENCOUNTER — TELEPHONE (OUTPATIENT)
Dept: PHARMACY | Facility: CLINIC | Age: 59
End: 2019-06-26

## 2019-06-26 NOTE — TELEPHONE ENCOUNTER
tablet TAKE 1 TABLET BY MOUTH ONE TIME DAILY Covered via DM program    glipiZIDE (GLUCOTROL) 10 MG tablet TAKE 1 TABLET BY MOUTH 2 TIMES A DAY BEFORE MEALS(REPLACES GLYBURIDE) Covered via DM program    lisinopril (PRINIVIL;ZESTRIL) 40 MG tablet Take 1 tablet by mouth daily Covered via DM program    chlorthalidone (HYGROTON) 25 MG tablet Take 1 tablet by mouth daily Not covered via DM program, generic    rOPINIRole (REQUIP) 2 MG tablet TAKE (2) TABLETS BY MOUTH ONCE DAILY AT NIGHT. Not covered via DM program, generic - patient reports restless legs and needs therapy     Allergies: Allergen Reactions    Codeine Nausea And Vomiting     Pertinent Labs/Vitals:  BP Readings from Last 3 Encounters:   06/18/19 105/67   03/12/19 118/74   01/03/19 104/70     Mircoalb, Ur   Component Value Date    LABMICR <12 11/26/2018     A1c   Component Value Date    LABA1C 8.4 (H) 06/24/2019    LABA1C 7.8 06/18/2019    LABA1C 8.0 (H) 02/18/2019     Lipids   Component Value Date    CHOL 137 06/24/2019    TRIG 190 (H) 06/24/2019    HDL 57 06/24/2019    LDLCHOLESTEROL 42 06/24/2019     ALT   Date Value Ref Range Status   02/18/2019 40 (H) 5 - 33 U/L Final     AST   Date Value Ref Range Status   02/18/2019 22 <32 U/L Final     The 10-year ASCVD risk score (Renato Aviles et al., 2013) is: 3.3%    Values used to calculate the score:      Age: 63 years      Sex: Female      Is Non- : No      Diabetic: Yes      Tobacco smoker: No      Systolic Blood Pressure: 883 mmHg      Is BP treated: Yes      HDL Cholesterol: 57 mg/dL      Total Cholesterol: 137 mg/dL     SCr   Component Value Date    CREATININE 0.92 (H) 02/18/2019     CrCL ~71 mL/min (calculated using sCr 0.92 mg/dL, Ht 1.6 m, Adj. BW 67.2 kg, using C-G equation)  eGFR: >60 mL/min/1.73 m^2    Social History:   Tobacco Use    Smoking status: Never Smoker    Smokeless tobacco: Never Used   Substance Use Topics    Alcohol use:  Yes     Alcohol/week: 0.0 oz Comment: rarely/ 4 times a year     Immunizations:   Administered Date(s) Administered    DTaP 12/15/2010    Influenza Virus Vaccine 11/27/2015    Influenza, Epifanio Parent, 3 yrs and older, IM, PF (Fluzone 3 yrs and older or Afluria 5 yrs and older) 11/16/2018    Influenza, Epifanio Parent, 6 mo and older, IM (Flulaval) 10/31/2017    Pneumococcal Polysaccharide ([id number]) 11/27/2015     ASSESSMENT/PLAN:   - General Assessment:   · Adherence: States adherent  · Cost: Concern now that she is unable to use payroll deduction for prescriptions  · Non-formulary or medications with cost-effective alternatives: repaglinide, chlorthalidone, ezetimibe (see below)  · Drug interactions: No clinically significant interactions identified via Differential Interaction Analysis as category D or higher. · Renal dosing: No renal adjustments necessary. · Blood pressure: Is at BP target of <140/90. · Lipids: Patient is prescribed moderate-intensity statin therapy. · Other: Patient no longer taking taking calcium-vitamin D, she does not know if she needs; if restarts best price would likely be an OTC product    Recommended changes to medications:   1.) Discontinue repaglinide     Repaglinide and glipizide have duplicate mechanism of actions as both stimulate insulin release from beta cells in pancreas. Recommend stopping repaglinide to decrease pill burden and as it is not covered by the DM program copay waiver. Patient mentioned she thought repaglinide helped a lot with decreasing A1c. Per chart review, this medication was started in September 2018. Noted Trulicity started in January 2019. Decrease in A1c could be due to Trulicity rather than repaglinide. See A1c below. If A1c increases after repaglinide stopped, other therapies can be optimized including Trulicity and Synjardy. Ref.  Range 9/11/2018 10:20 10/17/2018 16:28 2/18/2019 09:15 6/18/2019 08:35 6/24/2019 08:00   Hemoglobin A1C Latest Ref Range: 4.0 - 6.0 % 10.3 10.4 8.0 (H) 7.8

## 2019-06-28 ENCOUNTER — HOSPITAL ENCOUNTER (OUTPATIENT)
Dept: DIABETES SERVICES | Age: 59
Setting detail: THERAPIES SERIES
Discharge: HOME | End: 2019-06-28
Payer: COMMERCIAL

## 2019-06-28 DIAGNOSIS — E11.65 UNCONTROLLED TYPE 2 DIABETES MELLITUS WITH HYPERGLYCEMIA (HCC): Primary | ICD-10-CM

## 2019-06-28 PROCEDURE — G0108 DIAB MANAGE TRN  PER INDIV: HCPCS

## 2019-06-28 SDOH — ECONOMIC STABILITY: FOOD INSECURITY: ADDITIONAL INFORMATION: NO

## 2019-06-28 NOTE — PROGRESS NOTES
GLUCOSE) w/Device KIT 1 Device by Does not apply route 3 times daily 1 kit 0    blood glucose test strips (PRODIGY NO CODING BLOOD GLUC) strip 1 each by In Vitro route daily As needed. 100 each 3    Empagliflozin-metFORMIN HCl (SYNJARDY) 5-1000 MG TABS Take 1 tablet by mouth 2 times daily 180 tablet 2    ASPIRIN ADULT LOW STRENGTH 81 MG EC tablet TAKE 1 TABLET BY MOUTH ONE TIME DAILY 30 tablet 2    glipiZIDE (GLUCOTROL) 10 MG tablet TAKE 1 TABLET BY MOUTH 2 TIMES A DAY BEFORE MEALS(REPLACES GLYBURIDE) 180 tablet 1    lisinopril (PRINIVIL;ZESTRIL) 40 MG tablet Take 1 tablet by mouth daily 90 tablet 3    chlorthalidone (HYGROTON) 25 MG tablet Take 1 tablet by mouth daily 90 tablet 3    rOPINIRole (REQUIP) 2 MG tablet TAKE (2) TABLETS BY MOUTH ONCE DAILY AT NIGHT. 180 tablet 3    Calcium Carbonate-Vitamin D (OYSTER SHELL CALCIUM/D) 500-200 MG-UNIT TABS Take 2 tablets by mouth daily 180 tablet 5     No current facility-administered medications for this encounter.    :     Comments:  Allergies: Allergies   Allergen Reactions    Codeine Nausea And Vomiting     Diabetes 5  / Health Status    A1C blood level - at goal < 7%   Lab Results   Component Value Date    LABA1C 8.4 (H) 06/24/2019    LABA1C 7.8 06/18/2019    LABA1C 8.0 (H) 02/18/2019     Lab Results   Component Value Date    GLUF 262 (H) 09/10/2018    LABMICR CANNOT BE CALCULATED 11/26/2018    CREATININE 0.92 (H) 02/18/2019       Blood pressure ( 130/ 80)  Or less  BP Readings from Last 3 Encounters:   06/18/19 105/67   03/12/19 118/74   01/03/19 104/70        Cholesterol ( LDL under  100)   Lab Results   Component Value Date    LDLCHOLESTEROL 42 06/24/2019    LDLDIRECT 156 (H) 05/20/2017       4 . Smoking ? []Yes   [x]No    5. Taking an Asprin daily?   [x]Yes   []No          Diabetes Self- Management Education Record    Participant Name: Aurora Patiño  Referring Provider: Jessica Nicole MD   Assessment/Evaluation Ratings:  1=Needs Instruction   4=Demonstrates Understanding/Competency  2=Needs Review   NC=Not Covered    3=Comprehends Key Points  N/A=Not Applicable  Topics/Learning Objectives Pre-session Assess Date:  8/21/17 CB    6/28/19rs Instr. Date Reinforce Date Post- session Eval Comments   Diabetes disease process & Treatment process: Define diabetes & pre-diabetes; Identify own type of diabetes; role of the pancreas; signs/symptoms; diagnostic criteria; prevention & treatment options; contributing factors. 1    2- 6/28/19rs 8/31/17 CB 6/28/19rs  [id number] ,diet controlled 2 weeks as delivered @30 weeks  No f/u until second pregnancy 1999 with diabtes in pregnancy on insulin, educated at Dr. Antonieta Sanches office  8/21/17 CB    6/28/19rs- A1C trend has been in 8.0% range and on rise - following [facility] and diabetes management [facility] program - reinforced progressive nature of DM with patient      Incorporating nutritional management into lifestyle: Describe effect of type, amount & timing of food on blood glucose; Describe basic meal planning techniques & current nutrition guideline   1, sometimes skips breakfast, avoids sweets, but loves CHO, drinks tea with splenda, occ diet pop.  does grocery shopping 8/21/17 CB      6/28/19  BK - some times skipped  Glucerna or eggs at work    at home skipped    ALLY - most meals at [employer] veggies added - trying to eat salads but does not really enjoy + portion or soup    Dn - is a problem area - 82 yo  friend cooks and bakes- mashed potatoes and noodles and high CHO meals for patient and her family - jello salads  Has - at home  - 2 girls - both picky    PM snacks - ice creams at night     Only sugar free beverages -   Overall has good understaning of CHO food groups and portions - working on practice of CHO meal intake     9/8/17 JW    6/28/19rs   What to eat - Food groups, When to eat - timing of meals and snacks, and How much to eat - portions control.   1500 calories/ day   CHO choices/ meal  3,1,3,1,3,1   CHO choices/  day   grams of protein /day   gram of fat /day     6/28/19- Discussed with patient CHO targets of meals 45 - 06 grams - CHO counting and meal planning resources. Also need to look at relationship of food and health - OK to eat some and then get rid of high CHO food that her friend Owen prepares for her and family. Recommended try to log food and CHO and then check BG post meals to see response - given a log Book for both    Correctly read food labels & demonstrate CHO counting & portion control with personalized meal plan. Identify dining out strategies, & dietary sick day guidelines. 1    2- 6/28/19rs 9/8/17 JW      Incorporating physical activity into lifestyle:   Verbalize effect of exercise on blood glucose levels; benefits of regular exercise; safety considerations; contraindications; maintenance of activity. 1    2- 6/28/19rs 8/21/17 CB 8/31/17 CB  Moving at [employer]  Limited, has tendenitis of right foot and Doctor wants to put her in cast and be off work 6 weeks. Uses boot now and limits walking. Discussed other moving exercises at [employer]. 8/21/17 CB   Using medications safely:  Identify effects of diabetes medicines on blood glucose levels; List diabetes medication taken, action & side effects;    1    2-6/28/19rs 8/31/17 CB   Metformin 1000 mg 2x/d  Glyburide 2tabs 2x/d  farxiga 10 daily    8/21/17 CB     Insulin / Injectable - Appropriate injection sites; proper storage; supplies needed; proper technique; safe needle disposal guidelines. 1    Took insulin with second pregnancy but  had to do the shots, fearful of needles 8/21/17 CB   Monitoring blood glucose, interpreting and using results:  Identify recommended & personal blood glucose targets; importance of testing; testing supplies; HgbA1C target levels; Factors affecting blood glucose;  Importance of logging blood glucose levels for pattern recognition; ketone testing; safe lancet disposal.   1    1- 6/28/19rs 8/21/17 CB 8/31/17 CB  Plans to get meter and supplies. Did BG in office today 278      6/28/19rs- has meter is not checking - suggested she try to do more checks and reviewed A1C trend with patient    Stopped checking BG as \"does not like needles\"  Will need new meter that insurance will cover. Discussed the chronic disease management for free supplies. Current A1C 9.6% on 7/21/17. Patient aware this out of target range 8/21/17 CB   Prevention, detection & treatment of acute complications:  Identify symptoms of hyper & hypoglycemia, and prevention & treatment strategies. 1    2- 6/28/19rs 8/21/17 CB 8/31/17 CB  Low BG sx but not sure how low as she does not check, takes piece of fruit which helps. Discussed rule of 15 and to check BG to confirm. 8/21/17 CB   Describe sick day guidelines & indications for  physician notification. Identify short term consequences of poor control. 1    1-6/28/19rs 9/8/17 JW      Prevention, detection & treatment of chronic complications:  Define the natural course of diabetes & describe the relationship of blood glucose levels to long term complications of diabetes. Identify preventative measures & standards of care. 1     8/31/17 CB   Sees Podiatrist for tendonitis, sees eye Dr for start of glaucoma. Has seen toe amputation with brother who has DM   helps to trim toenails. 8/31/17 CB   Developing strategies to address psychosocial issues:  Describe feelings about living with diabetes; Describe how stress, depression & anxiety affect blood glucose; Identify coping strategies; Identify support needed & support network available. 1    1- 6/28/19rs 8/31/17 CB   Was told she needed to come here for insurance. Admits to being noncompliant and knows what she has to do but not motivated.  supportive. Her 21 y daughter has some medical issues and borderline Asperger's    8/21/17 CB    Still fearful of needles but willing to start checking BG .  Will look into getting supplies  8/31/17 CB   Developing strategies to promote health/change behavior: Identify 7 self-care behaviors; Personal health risk factors; Benefits, challenges & strategies for behavioral change;    1         Individualized goal selection. 8/31/17 CB    My goal , to help me improve my health, I will:   1.learn to CHO count and follow a meal plan    2.try not to skip meals  ( started eating greek yogurt for breakfast 8/31/17 CB)    3.to start checking BG at least 1x/d 8/31/17 CB     Plan  Follow-up Appointments planned in individual setting. Next Appointment- will call as needed - plan for phone call in sept and thinking about oct class session in [facility] [address] [facility] [address] Instruction Method: [x]Lecture/Discussion  []Power Point Presentation  [x]Handouts  [x]Return Demonstration      Education Materials/Equipment Provided:    [x]Self-Management - Initial assessment - Enrolment in to ADA  Where do I Begin, Living with Type 2 diabetes ADA home support program and handout for no concentrated sweets and diet meal planning basics, handout on diabetes education classes.  8/21/17 CB      [x]Self-Management  Class 1 - Todd Stevens Well with Diabetes Booklet and Healthy i On the Road to better managing your diabetes map handouts8/31/17 CB    [x] Self-Management  Class 2 - Meal Plan and handout for serving sizes, smarter snacking, Ready Set Carb Counting / Plate Method, Nutrient Conversion and International Diabetes [address] Eating for People with Diabetes and Nutrition in the [employer] - fast facts about fast food9/8/17 JW  [x] Self-Management  Class 3 -  Diabetes ID card,  foot care tips sheet, Healthy I  Continuing Your Journey with Diabetes map handout, Individualized Diabetes report card8/31/17 CB    [x] Self-Management Class 4 - BD Booklet  Sick Day [place] and  Dinning Out Guide , recipe hand outs and tips, diabetes Cookbooks  ( when available) 9/8/17 JW    []Self-Management - 3 month follow - up  AADE booklet Side by Lillie Jaime a partnership approach to diabetes self- care, [place] Tobacco Quit [place], [place] Diabetes support program information sheet, [facility] information sheet       []Self-Management  Gestational - RN class -Resource materials provided today include educational self care booklet - \" Gestational Diabetes - A Healthy pregnancy and beyond\"  with SMGB and 3 small meals and 3 small snacks diet plan. SMBG sheets to fax back to [facility] weekly.  [facility] guidelines for SMGB and blood glucose log sheets, Never too early to prevent diabetes handout from [facility]      []Self-Management Gestational - RD class - My Food Plan for Gestational diabetes    []Glucose Meter     []Insulin Kit     []Other      Encounter Type Date Start Time End Time Comments No Show Dates   Assessment 8/21/17 CB    6/28/19rs- / annual   4:30    8 30  5:45    9 30    [x]In Person  []Telephone     X    Class 1 - Understanding diabetes 8/31/17 CB 8:30 9:30  x    Class 2- Nutrition and diabetes   9/8/17 JW 11:30 1:30 x    Class 3 - Preventing Complications 9/96/73 CB 8:30 9:30  x    Class 4 -  In depth Nutrition and sick day care 9/8/17 JW 11:30 1:30 x    Class 5 - 3 month follow up / goal reassessment        Gestational - RN         Gestational - RD        Individual MNT         Shared Med Appt         Yearly Follow-up        Meter Instrx        Insulin Instrx      []Pen  []Vial & Syringe      DSMS Support Plan:  Follow-up plan:     [] MNT referral request / Appointment     [] Annual update referral request and appointment  after      [x]ADA  Where do I Begin, Living with Type 2 diabetes ADA home support program-- 6/28/19 rs   [] Healthy U - Diabetes workshops via [address]       [] Starting [employer] program /  [employer] [phone]    []  Support Group / Janae Menard of [address] [place] [address] Drive 8 week diabetes education support   classes James Acosta [phone]      []   [facility] application  / scholarship program     []ADA care 4 life ignacia      [x]  Internet web sites - ADA 6/28/19rs--     Post Education Referrals:      [] [address] information sheet and [address] [facility] , 21       [] Dental care - Dental care of [employer] or [facility]    [] [facility]) link  phone number - for information and referral to [facility]  Clinically  [address], FOOT, CARDIAC, WOUND, WEIGHT MANAGEMENT        [x]Other-- following with CC, BBW and [facility] medication managment  Lilian Carey RN

## 2019-07-01 VITALS — BODY MASS INDEX: 35.44 KG/M2 | WEIGHT: 200 LBS

## 2019-08-21 RX ORDER — BUSPIRONE HYDROCHLORIDE 10 MG/1
TABLET ORAL
Qty: 180 TABLET | Refills: 0 | Status: SHIPPED | OUTPATIENT
Start: 2019-08-21 | End: 2019-11-10 | Stop reason: SDUPTHER

## 2019-09-26 ENCOUNTER — OFFICE VISIT (OUTPATIENT)
Dept: FAMILY MEDICINE CLINIC | Age: 59
End: 2019-09-26
Payer: COMMERCIAL

## 2019-09-26 VITALS
SYSTOLIC BLOOD PRESSURE: 107 MMHG | WEIGHT: 197 LBS | DIASTOLIC BLOOD PRESSURE: 65 MMHG | HEIGHT: 63 IN | HEART RATE: 79 BPM | BODY MASS INDEX: 34.91 KG/M2

## 2019-09-26 DIAGNOSIS — E11.9 TYPE 2 DIABETES MELLITUS WITHOUT COMPLICATION, WITHOUT LONG-TERM CURRENT USE OF INSULIN (HCC): Primary | ICD-10-CM

## 2019-09-26 LAB — HBA1C MFR BLD: 7.4 %

## 2019-09-26 PROCEDURE — 83036 HEMOGLOBIN GLYCOSYLATED A1C: CPT | Performed by: FAMILY MEDICINE

## 2019-09-26 PROCEDURE — 99213 OFFICE O/P EST LOW 20 MIN: CPT | Performed by: FAMILY MEDICINE

## 2019-09-26 RX ORDER — RANITIDINE 150 MG/1
150 TABLET ORAL 2 TIMES DAILY
Qty: 60 TABLET | Refills: 3 | Status: SHIPPED | OUTPATIENT
Start: 2019-09-26 | End: 2020-03-16

## 2019-09-26 ASSESSMENT — ENCOUNTER SYMPTOMS
CONSTIPATION: 0
SHORTNESS OF BREATH: 0
ABDOMINAL PAIN: 0
DIARRHEA: 0
COUGH: 0

## 2019-09-26 NOTE — PROGRESS NOTES
Taking? Authorizing Provider   busPIRone (BUSPAR) 10 MG tablet TAKE 1 TABLET BY MOUTH 3 TIMES A DAY Yes Neymar Hines MD   bimatoprost (LUMIGAN) 0.01 % SOLN ophthalmic drops Place 1 drop into both eyes nightly Yes Historical Provider, MD   Dulaglutide (TRULICITY) 6.67 YX/5.4TB SOPN INJECT THE CONTENTS OF ONE SYRINGE UNDER THE SKIN ONCE WEEKLY Yes Neymar Hines MD   repaglinide (PRANDIN) 1 MG tablet TAKE 1 TABLET BY MOUTH 3 TIMES A DAY BEFORE MEALS Yes Neymar Hines MD   ezetimibe (ZETIA) 10 MG tablet Take 1 tablet by mouth daily Yes Neymar Hines MD   rosuvastatin (CRESTOR) 10 MG tablet TAKE 1 TABLET BY MOUTH ONE TIME A DAY Yes Neymar Hines MD   PRODIGY LANCETS 28G MISC 1 Bottle by Does not apply route three times daily Yes Neymar Hines MD   Blood Glucose Monitoring Suppl (PRODIGY AUTOCODE BLOOD GLUCOSE) w/Device KIT 1 Device by Does not apply route 3 times daily Yes Neymar Hines MD   blood glucose test strips (PRODIGY NO CODING BLOOD GLUC) strip 1 each by In Vitro route daily As needed. Yes Neymar Hines MD   Empagliflozin-metFORMIN HCl (SYNJARDY) 5-1000 MG TABS Take 1 tablet by mouth 2 times daily Yes Neymar Hines MD   ASPIRIN ADULT LOW STRENGTH 81 MG EC tablet TAKE 1 TABLET BY MOUTH ONE TIME DAILY Yes Neymar Hines MD   glipiZIDE (GLUCOTROL) 10 MG tablet TAKE 1 TABLET BY MOUTH 2 TIMES A DAY BEFORE MEALS(REPLACES GLYBURIDE) Yes Neymar Hines MD   lisinopril (PRINIVIL;ZESTRIL) 40 MG tablet Take 1 tablet by mouth daily Yes Neymar Hines MD   chlorthalidone (HYGROTON) 25 MG tablet Take 1 tablet by mouth daily Yes Neymar Hines MD   rOPINIRole (REQUIP) 2 MG tablet TAKE (2) TABLETS BY MOUTH ONCE DAILY AT NIGHT. Yes Neymar Hines MD        Social History     Tobacco Use    Smoking status: Never Smoker    Smokeless tobacco: Never Used   Substance Use Topics    Alcohol use:  Yes     Alcohol/week: 0.0 standard drinks     Comment: rarely/ 4 times a year        Vitals:    09/26/19 0839   BP:

## 2019-10-14 RX ORDER — GLIPIZIDE 10 MG/1
TABLET ORAL
Qty: 180 TABLET | Refills: 1 | Status: SHIPPED | OUTPATIENT
Start: 2019-10-14 | End: 2020-04-08

## 2019-10-21 RX ORDER — ROPINIROLE 2 MG/1
TABLET, FILM COATED ORAL
Qty: 180 TABLET | Refills: 0 | Status: SHIPPED | OUTPATIENT
Start: 2019-10-21 | End: 2019-10-27 | Stop reason: SDUPTHER

## 2019-10-28 RX ORDER — ROPINIROLE 2 MG/1
TABLET, FILM COATED ORAL
Qty: 180 TABLET | Refills: 0 | Status: SHIPPED | OUTPATIENT
Start: 2019-10-28 | End: 2020-04-08

## 2019-11-11 RX ORDER — BUSPIRONE HYDROCHLORIDE 10 MG/1
TABLET ORAL
Qty: 180 TABLET | Refills: 0 | Status: SHIPPED | OUTPATIENT
Start: 2019-11-11 | End: 2020-02-24

## 2019-11-11 RX ORDER — ASPIRIN 81 MG/1
TABLET ORAL
Qty: 30 TABLET | Refills: 2 | Status: SHIPPED | OUTPATIENT
Start: 2019-11-11 | End: 2020-03-16

## 2019-11-25 ENCOUNTER — TELEPHONE (OUTPATIENT)
Dept: PHARMACY | Facility: CLINIC | Age: 59
End: 2019-11-25

## 2019-12-05 ENCOUNTER — NURSE ONLY (OUTPATIENT)
Dept: FAMILY MEDICINE CLINIC | Age: 59
End: 2019-12-05
Payer: COMMERCIAL

## 2019-12-05 DIAGNOSIS — Z23 NEED FOR INFLUENZA VACCINATION: Primary | ICD-10-CM

## 2019-12-05 PROCEDURE — 90686 IIV4 VACC NO PRSV 0.5 ML IM: CPT

## 2019-12-13 ENCOUNTER — HOSPITAL ENCOUNTER (OUTPATIENT)
Facility: CLINIC | Age: 59
Discharge: HOME | End: 2019-12-13
Payer: COMMERCIAL

## 2019-12-13 DIAGNOSIS — E11.9 TYPE 2 DIABETES MELLITUS WITHOUT COMPLICATION, WITHOUT LONG-TERM CURRENT USE OF INSULIN (HCC): ICD-10-CM

## 2019-12-13 LAB
CREATININE URINE: 93.7 MG/DL (ref 28–217)
MICROALBUMIN/CREAT 24H UR: <12 MG/L
MICROALBUMIN/CREAT UR-RTO: NORMAL MCG/MG CREAT

## 2019-12-13 PROCEDURE — 82043 UR ALBUMIN QUANTITATIVE: CPT

## 2019-12-13 PROCEDURE — 82570 ASSAY OF URINE CREATININE: CPT

## 2020-01-02 NOTE — TELEPHONE ENCOUNTER
E-scribe request for Trulicity and Lisinopril. Please review and e-scribe if applicable. Next Visit Date:  Visit date not found    Health Maintenance   Topic Date Due    Hepatitis B vaccine (1 of 3 - Risk 3-dose series) [birth year]10/1980    Diabetic retinal exam  01/12/2017    Diabetic foot exam  12/01/2017    Shingles Vaccine (1 of 2) 02/27/2020 (Originally 3/10/2011)    Potassium monitoring  02/18/2020    Creatinine monitoring  02/18/2020    Breast cancer screen  02/26/2020    Lipid screen  06/24/2020    A1C test (Diabetic or Prediabetic)  09/26/2020    Diabetic microalbuminuria test  12/13/2020    DTaP/Tdap/Td vaccine (2 - Tdap) 12/15/2020    Colon cancer screen colonoscopy  09/20/2022    Flu vaccine  Completed    Pneumococcal 0-64 years Vaccine  Completed    Hepatitis C screen  Completed    HIV screen  Completed             (applicable per patient's age: Cancer Screenings, Depression Screening, Fall Risk Screening, Immunizations)    Hemoglobin A1C (%)   Date Value   09/26/2019 7.4   06/24/2019 8.4 (H)   06/18/2019 7.8     Microalb/Crt. Ratio (mcg/mg creat)   Date Value   12/13/2019 CANNOT BE CALCULATED     LDL Cholesterol (mg/dL)   Date Value   06/24/2019 42     AST (U/L)   Date Value   02/18/2019 22     ALT (U/L)   Date Value   02/18/2019 40 (H)     BUN (mg/dL)   Date Value   02/18/2019 19      (goal A1C is < 7)   (goal LDL is <100) need 30-50% reduction from baseline     BP Readings from Last 3 Encounters:   09/26/19 107/65   06/18/19 105/67   03/12/19 118/74    (goal /80)      All Future Testing planned in [facility]:  Lab Frequency Next Occurrence   ALT Once 06/18/2020       Next Visit Date:  No future appointments.          Patient Active Problem List:     Cervical spondylosis     Type II or unspecified type diabetes mellitus without mention of complication, not stated as uncontrolled     Hypertension     Abnormal auditory perception     Eustachian tube dysfunction     Chronic serous

## 2020-01-03 RX ORDER — LISINOPRIL 40 MG/1
TABLET ORAL
Qty: 90 TABLET | Refills: 2 | Status: SHIPPED | OUTPATIENT
Start: 2020-01-03 | End: 2020-09-25

## 2020-01-15 ENCOUNTER — TELEPHONE (OUTPATIENT)
Dept: PHARMACY | Facility: CLINIC | Age: 60
End: 2020-01-15

## 2020-01-15 NOTE — TELEPHONE ENCOUNTER
Called patient to schedule yearly pharmacist appointment to discuss medications for Diabetes Management Program.     Spoke to patient and appointment scheduled for 01/21/20 at 200PM and patient gave consent to remain in the DM Program for 2020     [address] [facility], toll free: [phone], option 7

## 2020-01-15 NOTE — LETTER
[address]  [address], [address]  Phone: [phone]  Fax: Xtfizpzf Justina Garcia  [place] [address]         01/15/20     Dear Glenn Pinto,    Congratulations! You have completed the 2019 requirements for the [address] will automatically be re-enrolled into the [facility]) Diabetes Management Program for 2020. One of the requirements to participate in the [facility] Diabetes Management Program is to complete a Clinical Pharmacist Telephone appointment yearly. We would like to work with you and your doctor to:  - Review your medications, including over-the-counter and herbal medications  - Answer questions about your medications and how to get the most benefit from them  - Identify potential drug interactions or side effects and help fix them  - Identify preferred medications that are equally effective, but available at a lower cost to you  - Help you reach the necessary requirements to remain enrolled in the Diabetes Management Program offered by [facility]     Please call [phone] and select option #7 to schedule this appointment to take advantage of this service. Telephone appointments are available Monday thru Friday from 7:30 AM till 5:30 PM.     This is a courtesy reminder. If you have this appointment already scheduled for your 2020 enrollment in the program, please disregard this message. If you have not scheduled this appointment yet, please contact us at the above number to schedule.      Sincerely,      [address]  Phone: [phone], option 7

## 2020-01-21 ENCOUNTER — SCHEDULED TELEPHONE ENCOUNTER (OUTPATIENT)
Dept: PHARMACY | Facility: CLINIC | Age: 60
End: 2020-01-21

## 2020-01-21 RX ORDER — ACETAMINOPHEN 500 MG
1000 TABLET ORAL DAILY PRN
Status: ON HOLD | COMMUNITY
End: 2022-06-09 | Stop reason: HOSPADM

## 2020-01-21 RX ORDER — IBUPROFEN 200 MG
400 TABLET ORAL DAILY PRN
COMMUNITY
End: 2020-09-29 | Stop reason: ALTCHOICE

## 2020-01-21 NOTE — TELEPHONE ENCOUNTER
Glucose Monitoring Suppl (PRODIGY AUTOCODE BLOOD GLUCOSE) w/Device KIT 1 Device by Does not apply route 3 times daily $0 copay - tests every couple weeks    blood glucose test strips (PRODIGY NO CODING BLOOD GLUC) strip 1 each by In Vitro route daily As needed. $0 copay - tests every couple weeks    Empagliflozin-metFORMIN HCl (SYNJARDY) 5-1000 MG TABS Take 1 tablet by mouth 2 times daily Covered via DM program    chlorthalidone (HYGROTON) 25 MG tablet Take 1 tablet by mouth daily Not cover via program - getting filled at mail order for cheapest copay      Current Pharmacy: mail order, some [facility]  Current testing supplies/frequency: [employer], maybe once per week    Allergies: Allergen Reactions    Codeine Nausea And Vomiting      Vitals/Labs:  BP Readings from Last 3 Encounters:   09/26/19 107/65   06/18/19 105/67   03/12/19 118/74     Microalbumin, Urine   Component Value Date    LABMICR <12 12/13/2019      Ref. Range 2/13/2018 09:14 4/14/2018 09:13 9/11/2018 10:20 10/17/2018 16:28 2/18/2019 09:15 6/18/2019 08:35 6/24/2019 08:00 9/26/2019 08:49   Hemoglobin A1C Latest Units: % 9.4 9.4 (H) 10.3 10.4 8.0 (H) 7.8 8.4 (H) 7.4       LIPIDS Ref. Range 2/18/2019 09:15 6/24/2019 08:00   Chol/HDL Ratio Latest Ref Range: <5  3.7 2.4   Cholesterol Latest Ref Range: <200 mg/dL 205 (H) 137   HDL Cholesterol Latest Ref Range: >40 mg/dL 55 57   LDL Cholesterol Latest Ref Range: 0 - 130 mg/dL 102 42   Triglycerides Latest Ref Range: <150 mg/dL 240 (H) 190 (H)     LFTs Ref.  Range 9/10/2018 09:16 2/18/2019 09:15   ALT Latest Ref Range: 5 - 33 U/L 61 (H) 40 (H)   AST Latest Ref Range: <32 U/L 32 (H) 22     The 10-year ASCVD risk score ([address] et al., 2013) is: 3.4%    Values used to calculate the score:      Age: 63 years      Sex: Female      Is Non- : No      Diabetic: Yes      Tobacco smoker: No      Systolic Blood Pressure: 371 mmHg      Is BP treated: Yes      HDL Cholesterol: 57 mg/dL      Total Cholesterol: 137 mg/dL     SCr   Component Value Date    CREATININE 0.92 (H) 02/18/2019     CrCL ~71 mL/min (calculated using sCr 0.92 mg/dL, Ht 1.6 m, Adj. BW 67.2 kg, using C-G equation)   eGFR: >60 mL/min/1.73 m^2    Immunizations:  Administered Date(s) Administered    DTaP 12/15/2010    DTaP vaccine 12/15/2010    Influenza Virus Vaccine 12/06/2013, 11/27/2015, 11/04/2016, 10/31/2017    Influenza Whole 12/06/2013    Influenza, Quadv, 6 mo and older, IM (Fluzone, Flulaval) 10/31/2017    Influenza, Quadv, IM, PF (6 mo and older Fluzone, Flulaval, Fluarix, and 3 yrs and older Afluria) 11/04/2016, 11/16/2018, 12/05/2019    Pneumococcal Polysaccharide ([id number]) 11/27/2015      Social History:  Tobacco Use    Smoking status: Never Smoker    Smokeless tobacco: Never Used   Substance Use Topics    Alcohol use: Yes     Alcohol/week: 0.0 standard drinks     Comment: rarely/ 4 times a year     ASSESSMENT:  Initial Program Requirements (Y indicates has completed for the year, N indicates needs to be completed by 07/01/2020): No - OV with provider for DM (1st)  No - A1c (1st)     Ongoing Program Requirements (Y indicates has completed for the year, N indicates needs to be completed by 12/31/2020): No - OV with provider for DM (2nd)  No - ACC/diabetes educator visit (if A1c over 8%) - not currently needed  No - A1c (2nd)  No - Lipid panel  No - Urine microalbumin  Yes - Pneumococcal vaccination: UTD, not needed again until age 72  No - Influenza vaccination for Fall 2020  No - Medication adherence over 70%  Yes - On statin or contraindication(s) LDL < 70 mg/dL  Yes - On ACEi/ARB or contraindication(s) - lisinopril     Formulary Medication Review:  Non-formulary or medications with cost-effective alternatives: will recommend switching to HCTZ from chlorthalidone, consider switching to rosuvastatin from ezetimibe.      Current medications eligible for copay waiver, up to $600, through [facility]  43. Given that patient now has cost concerns and combination product is covered via DM program copay waiver, recommend switching to lisinopril-hctz 20-12.5mg 2 tablets daily - this would keep lisinopril dose at 40mg/day. Per chart, recent BP readings at goal. --> Update: There have been issues with lisinopril-HCTZ combo products but HCTZ was added to formulary to account for this so can still consider switching from chlorthalidone back to HCTZ. - Therapy Optimization: Synjardy and Trulicity could be increased  - Medication compliance: compliant most of the time - uses pill box; per [employer] may have a few adherence problems but aware of cost concerns in past, trying to address and optimize program to patient's benefit   - Current exercise: no regular exercise - plans to increase walking with warmer weather     Other Considerations:  - Blood Pressure Goal: BP less than 140/90 mmHg due to history of DM: Is at blood pressure goal.   - Lipids: Patient is not currently prescribed moderate-intensity statin therapy. From June 2019 note/recommendation: Ezetimibe started in June d/t high TGs. Per June labwork, cholesterol and TGs decreased. Could increase rosuvastatin as patient is on a moderate-intensity dose - although LDL = 42. Given that patient has been losing weight over the past year and A1c improving, could continue to monitor lipids and discontinue ezetimibe at this point. If patient can afford, could stay on but if patient is not able to afford medication then will need to consider alternative option such as increasing the statin dose. --> Update: Patient not currently taking rosuvastatin. Need to clarify if patient should still be taking. Can consider increasing rosuvastatin if want patient on. Could consider stopping ezetimibe if lipids improve.    - Smoking status: never smoked  - Other: patient also takes OTC acetaminophen and ibuprofen - discussed max use; patient expressed concerns for liver, recent labs OK     PLAN:  - Consideration(s) for provider:   · Cost concerns and maximizing therapy  · Ezetimbe --> rosuvastatin  · Chlorthalidone --> HCTZ  · Repaglinide --> increasing Synjardy and Trulicity  - DM program gaps identified:   · Initial requirements: OV with provider for DM (1st) and A1c (1st)   · Ongoing requirements: OV with provider for DM (2nd), A1c (2nd), Lipid panel, Urine microalbumin, Influenza vaccination for [id number] and Medication adherence over 70%   - Education to patient: OTC apap and nsaids, optimizing therapy, program review  - Follow up: PCP for identified gaps or as scheduled below and Pharmacist for identified gaps or as scheduled below    Marianne Dougherty, PharmD, [facility], [address] [place] [address], [address] [place] [address] Pharmacist   [phone]    [facility]  [phone], Option 7    =======================================================    For Pharmacy Admin Tracking Only  PHSO: Yes  Total # of Interventions Recommended: 5 - med rev, rosuva, hctz, optimize therapy, refill  - New Order #: 0 New Medication Order Reason(s): Cost Conversion  - Refills Provided #: 2  - Updated Order #: 1 Updated Order Reason(s):  Other  Recommended intervention potential cost savings: 1  Total Interventions Accepted: 2 - med rev, refills  Time Spent (min): 120

## 2020-01-23 ENCOUNTER — TELEPHONE (OUTPATIENT)
Dept: PHARMACY | Facility: CLINIC | Age: 60
End: 2020-01-23

## 2020-01-23 RX ORDER — BLOOD-GLUCOSE CONTROL, LOW
EACH MISCELLANEOUS
Qty: 300 EACH | Refills: 3 | Status: CANCELLED | OUTPATIENT
Start: 2020-01-23

## 2020-02-05 RX ORDER — BLOOD-GLUCOSE CONTROL, LOW
EACH MISCELLANEOUS
Qty: 100 EACH | Refills: 3 | Status: SHIPPED | OUTPATIENT
Start: 2020-02-05 | End: 2020-11-12 | Stop reason: SDUPTHER

## 2020-02-05 NOTE — TELEPHONE ENCOUNTER
CLINICAL PHARMACY NOTE - [facility] Employee Diabetes Program     PGY2 Am Care Resident, Tasneem Fowler, spoke with PCP during clinic about recommendations last week. Dr. Maynor Mejia does not want to make any changes. Writer will email patient and let her know. Will also send separate encounter to PCP requesting refill of testing supplies again.      Oscar Qureshi, PharmD, [facility]  Ambulatory Clinical Care Pharmacist   [phone]    [facility]  [phone], Option 7

## 2020-02-05 NOTE — TELEPHONE ENCOUNTER
Dr. Araceli Dubon MD,     Your patient is currently enrolled in [facility]. After your patient's recent visit with a [facility], the below were identified as opportunities to assist with their diabetes management (if patient is not eligible for below recommendations, please reply with reason/contraindication):   · Consider issuing refills for formulary testing supplies as patient is almost out. Orders have been pended for you convenience.      See pharmacist note dated 01/21/2020 for complete details.     Pablo Lomeli, PharmD, [facility]  Ambulatory Clinical Care Pharmacist   [phone]    [facility]  [phone], Option 7

## 2020-02-10 RX ORDER — CHLORTHALIDONE 25 MG/1
TABLET ORAL
Qty: 90 TABLET | Refills: 1 | Status: SHIPPED | OUTPATIENT
Start: 2020-02-10 | End: 2020-08-25

## 2020-02-10 NOTE — TELEPHONE ENCOUNTER
E-scribe request for hygroton. Please review and e-scribe if applicable. Last Visit Date:  12-5-19  Next Visit Date:  Visit date not found    Hemoglobin A1C (%)   Date Value   09/26/2019 7.4   06/24/2019 8.4 (H)   06/18/2019 7.8             ( goal A1C is < 7)   Microalb/Crt.  Ratio (mcg/mg creat)   Date Value   12/13/2019 CANNOT BE CALCULATED     LDL Cholesterol (mg/dL)   Date Value   06/24/2019 42       (goal LDL is <100)   AST (U/L)   Date Value   02/18/2019 22     ALT (U/L)   Date Value   02/18/2019 40 (H)     BUN (mg/dL)   Date Value   02/18/2019 19     BP Readings from Last 3 Encounters:   09/26/19 107/65   06/18/19 105/67   03/12/19 118/74          (goal 120/80)        Patient Active Problem List:     Cervical spondylosis     Type II or unspecified type diabetes mellitus without mention of complication, not stated as uncontrolled     Hypertension     Abnormal auditory perception     Eustachian tube dysfunction     Chronic serous otitis media     Nasal polyps     Nasal congestion     Osteoarthritis of left knee     Osteoarthritis of right knee     DIEGO (nonalcoholic steatohepatitis)     Vitamin D deficiency     Iron deficiency anemia     Dyslipidemia     Diabetes mellitus type 2, uncontrolled (HCC)     Left hip pain     Trochanteric bursitis     Fatigue     Daytime somnolence     Snoring      ----Gato Starks

## 2020-02-24 RX ORDER — BUSPIRONE HYDROCHLORIDE 10 MG/1
TABLET ORAL
Qty: 180 TABLET | Refills: 0 | Status: SHIPPED | OUTPATIENT
Start: 2020-02-24 | End: 2020-05-05

## 2020-02-24 NOTE — TELEPHONE ENCOUNTER
E-scribe request for buspar. Please review and e-scribe if applicable. Last Visit Date:  12-5-19  Next Visit Date:  Visit date not found    Hemoglobin A1C (%)   Date Value   09/26/2019 7.4   06/24/2019 8.4 (H)   06/18/2019 7.8             ( goal A1C is < 7)   Microalb/Crt.  Ratio (mcg/mg creat)   Date Value   12/13/2019 CANNOT BE CALCULATED     LDL Cholesterol (mg/dL)   Date Value   06/24/2019 42       (goal LDL is <100)   AST (U/L)   Date Value   02/18/2019 22     ALT (U/L)   Date Value   02/18/2019 40 (H)     BUN (mg/dL)   Date Value   02/18/2019 19     BP Readings from Last 3 Encounters:   09/26/19 107/65   06/18/19 105/67   03/12/19 118/74          (goal 120/80)        Patient Active Problem List:     Cervical spondylosis     Type II or unspecified type diabetes mellitus without mention of complication, not stated as uncontrolled     Hypertension     Abnormal auditory perception     Eustachian tube dysfunction     Chronic serous otitis media     Nasal polyps     Nasal congestion     Osteoarthritis of left knee     Osteoarthritis of right knee     DIEGO (nonalcoholic steatohepatitis)     Vitamin D deficiency     Iron deficiency anemia     Dyslipidemia     Diabetes mellitus type 2, uncontrolled (HCC)     Left hip pain     Trochanteric bursitis     Fatigue     Daytime somnolence     Snoring      ----Racheal Herrera

## 2020-03-11 RX ORDER — DULAGLUTIDE 0.75 MG/.5ML
INJECTION, SOLUTION SUBCUTANEOUS
Qty: 2 ML | Refills: 2 | Status: SHIPPED | OUTPATIENT
Start: 2020-03-11 | End: 2020-06-10

## 2020-03-11 NOTE — TELEPHONE ENCOUNTER
Last visit:   Last Med refill:   Does patient have enough medication for 72 hours: no    Next Visit Date:  No future appointments. Health Maintenance   Topic Date Due    Hepatitis B vaccine (1 of 3 - Risk 3-dose series) 03/10/1980    Shingles Vaccine (1 of 2) 03/10/2011    Diabetic retinal exam  01/12/2017    Diabetic foot exam  12/01/2017    Potassium monitoring  02/18/2020    Creatinine monitoring  02/18/2020    Breast cancer screen  02/26/2020    Lipid screen  06/24/2020    A1C test (Diabetic or Prediabetic)  09/26/2020    Diabetic microalbuminuria test  12/13/2020    DTaP/Tdap/Td vaccine (2 - Tdap) 12/15/2020    Colon cancer screen colonoscopy  09/20/2022    Flu vaccine  Completed    Pneumococcal 0-64 years Vaccine  Completed    Hepatitis C screen  Completed    HIV screen  Completed    Hepatitis A vaccine  Aged Out    Hib vaccine  Aged Out    Meningococcal (ACWY) vaccine  Aged Out       Hemoglobin A1C (%)   Date Value   09/26/2019 7.4   06/24/2019 8.4 (H)   06/18/2019 7.8             ( goal A1C is < 7)   Microalb/Crt.  Ratio (mcg/mg creat)   Date Value   12/13/2019 CANNOT BE CALCULATED     LDL Cholesterol (mg/dL)   Date Value   06/24/2019 42   02/18/2019 102       (goal LDL is <100)   AST (U/L)   Date Value   02/18/2019 22     ALT (U/L)   Date Value   02/18/2019 40 (H)     BUN (mg/dL)   Date Value   02/18/2019 19     BP Readings from Last 3 Encounters:   09/26/19 107/65   06/18/19 105/67   03/12/19 118/74          (goal 120/80)    All Future Testing planned in [facility]  Lab Frequency Next Occurrence   ALT Once 06/18/2020               Patient Active Problem List:     Cervical spondylosis     Type II or unspecified type diabetes mellitus without mention of complication, not stated as uncontrolled     Hypertension     Abnormal auditory perception     Eustachian tube dysfunction     Chronic serous otitis media     Nasal polyps     Nasal congestion     Osteoarthritis of left knee Osteoarthritis of right knee     DIEGO (nonalcoholic steatohepatitis)     Vitamin D deficiency     Iron deficiency anemia     Dyslipidemia     Diabetes mellitus type 2, uncontrolled (HCC)     Left hip pain     Trochanteric bursitis     Fatigue     Daytime somnolence     Snoring           Please address the medication refill and close the encounter. If I can be of assistance, please route to the applicable pool. Thank you.

## 2020-03-16 RX ORDER — RANITIDINE 150 MG/1
TABLET ORAL
Qty: 60 TABLET | Refills: 3 | Status: SHIPPED
Start: 2020-03-16 | End: 2020-08-04 | Stop reason: SINTOL

## 2020-03-16 RX ORDER — EMPAGLIFLOZIN AND METFORMIN HYDROCHLORIDE 5; 1000 MG/1; MG/1
TABLET ORAL
Qty: 180 TABLET | Refills: 2 | Status: SHIPPED | OUTPATIENT
Start: 2020-03-16 | End: 2021-01-12

## 2020-03-16 RX ORDER — ASPIRIN 81 MG/1
TABLET ORAL
Qty: 30 TABLET | Refills: 2 | Status: SHIPPED | OUTPATIENT
Start: 2020-03-16 | End: 2020-06-17

## 2020-03-16 RX ORDER — REPAGLINIDE 1 MG/1
TABLET ORAL
Qty: 90 TABLET | Refills: 2 | Status: SHIPPED | OUTPATIENT
Start: 2020-03-16 | End: 2020-07-28

## 2020-03-20 RX ORDER — ONDANSETRON 4 MG/1
4 TABLET, FILM COATED ORAL 3 TIMES DAILY PRN
Qty: 30 TABLET | Refills: 0 | Status: SHIPPED | OUTPATIENT
Start: 2020-03-20 | End: 2022-01-13

## 2020-04-08 RX ORDER — ROPINIROLE 2 MG/1
TABLET, FILM COATED ORAL
Qty: 180 TABLET | Refills: 0 | Status: SHIPPED | OUTPATIENT
Start: 2020-04-08 | End: 2020-09-14 | Stop reason: SDUPTHER

## 2020-04-08 RX ORDER — GLIPIZIDE 10 MG/1
TABLET ORAL
Qty: 180 TABLET | Refills: 1 | Status: SHIPPED | OUTPATIENT
Start: 2020-04-08 | End: 2020-12-02 | Stop reason: SDUPTHER

## 2020-04-08 NOTE — TELEPHONE ENCOUNTER
Osteoarthritis of right knee     DIEGO (nonalcoholic steatohepatitis)     Vitamin D deficiency     Iron deficiency anemia     Dyslipidemia     Diabetes mellitus type 2, uncontrolled (HCC)     Left hip pain     Trochanteric bursitis     Fatigue     Daytime somnolence     Snoring No

## 2020-05-05 RX ORDER — BUSPIRONE HYDROCHLORIDE 10 MG/1
TABLET ORAL
Qty: 180 TABLET | Refills: 0 | Status: SHIPPED | OUTPATIENT
Start: 2020-05-05 | End: 2020-07-07 | Stop reason: SDUPTHER

## 2020-05-05 RX ORDER — OMEPRAZOLE 20 MG/1
20 CAPSULE, DELAYED RELEASE ORAL 2 TIMES DAILY
Qty: 180 CAPSULE | Refills: 3 | Status: SHIPPED | OUTPATIENT
Start: 2020-05-05 | End: 2021-04-20 | Stop reason: ALTCHOICE

## 2020-06-08 ENCOUNTER — TELEPHONE (OUTPATIENT)
Dept: PHARMACY | Facility: CLINIC | Age: 60
End: 2020-06-08

## 2020-06-10 RX ORDER — DULAGLUTIDE 0.75 MG/.5ML
INJECTION, SOLUTION SUBCUTANEOUS
Qty: 2 ML | Refills: 2 | Status: SHIPPED | OUTPATIENT
Start: 2020-06-10 | End: 2020-09-09

## 2020-06-17 RX ORDER — ASPIRIN 81 MG/1
TABLET ORAL
Qty: 30 TABLET | Refills: 2 | Status: SHIPPED | OUTPATIENT
Start: 2020-06-17 | End: 2020-09-25

## 2020-06-17 NOTE — TELEPHONE ENCOUNTER
auditory perception     Eustachian tube dysfunction     Chronic serous otitis media     Nasal polyps     Nasal congestion     Osteoarthritis of left knee     Osteoarthritis of right knee     DIEGO (nonalcoholic steatohepatitis)     Vitamin D deficiency     Iron deficiency anemia     Dyslipidemia     Diabetes mellitus type 2, uncontrolled (HCC)     Left hip pain     Trochanteric bursitis     Fatigue     Daytime somnolence     Snoring

## 2020-07-07 NOTE — TELEPHONE ENCOUNTER
Next Visit Date:  No future appointments. Health Maintenance   Topic Date Due    Hepatitis B vaccine (1 of 3 - Risk 3-dose series) 03/10/1980    Shingles Vaccine (1 of 2) 03/10/2011    Diabetic retinal exam  01/12/2017    Diabetic foot exam  12/01/2017    Potassium monitoring  02/18/2020    Creatinine monitoring  02/18/2020    Breast cancer screen  02/26/2020    Lipid screen  06/24/2020    Flu vaccine (1) 09/01/2020    A1C test (Diabetic or Prediabetic)  09/26/2020    Diabetic microalbuminuria test  12/13/2020    DTaP/Tdap/Td vaccine (2 - Tdap) 12/15/2020    Colon cancer screen colonoscopy  09/20/2022    Pneumococcal 0-64 years Vaccine  Completed    Hepatitis C screen  Completed    HIV screen  Completed    Hepatitis A vaccine  Aged Out    Hib vaccine  Aged Out    Meningococcal (ACWY) vaccine  Aged Out       Hemoglobin A1C (%)   Date Value   09/26/2019 7.4   06/24/2019 8.4 (H)   06/18/2019 7.8             ( goal A1C is < 7)   Microalb/Crt.  Ratio (mcg/mg creat)   Date Value   12/13/2019 CANNOT BE CALCULATED     LDL Cholesterol (mg/dL)   Date Value   06/24/2019 42   02/18/2019 102       (goal LDL is <100)   AST (U/L)   Date Value   02/18/2019 22     ALT (U/L)   Date Value   02/18/2019 40 (H)     BUN (mg/dL)   Date Value   02/18/2019 19     BP Readings from Last 3 Encounters:   09/26/19 107/65   06/18/19 105/67   03/12/19 118/74          (goal 120/80)    All Future Testing planned in [facility]  Lab Frequency Next Occurrence               Patient Active Problem List:     Cervical spondylosis     Type II or unspecified type diabetes mellitus without mention of complication, not stated as uncontrolled     Hypertension     Abnormal auditory perception     Eustachian tube dysfunction     Chronic serous otitis media     Nasal polyps     Nasal congestion     Osteoarthritis of left knee     Osteoarthritis of right knee     DIEGO (nonalcoholic steatohepatitis)     Vitamin D deficiency     Iron deficiency anemia     Dyslipidemia     Diabetes mellitus type 2, uncontrolled (HCC)     Left hip pain     Trochanteric bursitis     Fatigue     Daytime somnolence     Snoring

## 2020-07-09 RX ORDER — BUSPIRONE HYDROCHLORIDE 10 MG/1
TABLET ORAL
Qty: 180 TABLET | Refills: 0 | Status: SHIPPED | OUTPATIENT
Start: 2020-07-09 | End: 2020-09-15

## 2020-07-23 ENCOUNTER — TELEPHONE (OUTPATIENT)
Dept: PHARMACY | Facility: CLINIC | Age: 60
End: 2020-07-23

## 2020-07-23 ENCOUNTER — PATIENT MESSAGE (OUTPATIENT)
Dept: PHARMACY | Facility: CLINIC | Age: 60
End: 2020-07-23

## 2020-07-23 NOTE — TELEPHONE ENCOUNTER
Pharmacy Pop Care Documentation:   Called patient with reminder for requirements for Diabetes Management Program.     According to our records, patient is missing the following requirement(s) that must be completed by September 23, 2020:   · 1st 2020 Provider Visit for DM  · 1st 2020 A1C        Patient not available at the time of call. Left message on home/cell TAD with the above information. [employer] message sent.      Aviva Hartmann, [facility]   Department, toll free: [phone], option 7

## 2020-07-28 RX ORDER — REPAGLINIDE 1 MG/1
TABLET ORAL
Qty: 90 TABLET | Refills: 2 | Status: SHIPPED | OUTPATIENT
Start: 2020-07-28 | End: 2020-12-08

## 2020-07-28 NOTE — TELEPHONE ENCOUNTER
Please address the medication refill and close the encounter. If I can be of assistance, please route to the applicable pool. Thank you. Last visit: 09/26/19  Last Med refill: 03/16/20  Does patient have enough medication for 72 hours: No:     Next Visit Date:  Future Appointments   Date Time Provider Nicola Zaidi   8/4/2020  8:30 AM Salma Jones MD [address] [place] [address] Maintenance   Topic Date Due    Hepatitis B vaccine (1 of 3 - Risk 3-dose series) 03/10/1980    Shingles Vaccine (1 of 2) 03/10/2011    Diabetic retinal exam  01/12/2017    Diabetic foot exam  12/01/2017    Potassium monitoring  02/18/2020    Creatinine monitoring  02/18/2020    Breast cancer screen  02/26/2020    Lipid screen  06/24/2020    Flu vaccine (1) 09/01/2020    A1C test (Diabetic or Prediabetic)  09/26/2020    Diabetic microalbuminuria test  12/13/2020    DTaP/Tdap/Td vaccine (2 - Tdap) 12/15/2020    Colon cancer screen colonoscopy  09/20/2022    Pneumococcal 0-64 years Vaccine  Completed    Hepatitis C screen  Completed    HIV screen  Completed    Hepatitis A vaccine  Aged Out    Hib vaccine  Aged Out    Meningococcal (ACWY) vaccine  Aged Out       Hemoglobin A1C (%)   Date Value   09/26/2019 7.4   06/24/2019 8.4 (H)   06/18/2019 7.8             ( goal A1C is < 7)   Microalb/Crt.  Ratio (mcg/mg creat)   Date Value   12/13/2019 CANNOT BE CALCULATED     LDL Cholesterol (mg/dL)   Date Value   06/24/2019 42   02/18/2019 102       (goal LDL is <100)   AST (U/L)   Date Value   02/18/2019 22     ALT (U/L)   Date Value   02/18/2019 40 (H)     BUN (mg/dL)   Date Value   02/18/2019 19     BP Readings from Last 3 Encounters:   09/26/19 107/65   06/18/19 105/67   03/12/19 118/74          (goal 120/80)    All Future Testing planned in [facility]  Lab Frequency Next Occurrence               Patient Active Problem List:     Cervical spondylosis     Type II or unspecified type diabetes mellitus without mention of complication, not stated as uncontrolled     Hypertension     Abnormal auditory perception     Eustachian tube dysfunction     Chronic serous otitis media     Nasal polyps     Nasal congestion     Osteoarthritis of left knee     Osteoarthritis of right knee     DIEGO (nonalcoholic steatohepatitis)     Vitamin D deficiency     Iron deficiency anemia     Dyslipidemia     Diabetes mellitus type 2, uncontrolled (HCC)     Left hip pain     Trochanteric bursitis     Fatigue     Daytime somnolence     Snoring

## 2020-07-29 NOTE — TELEPHONE ENCOUNTER
Received the following e-mail from patient:  I was able to schedule an appointment w/my PCP, Mili Olsen, for August 4th at 8:30. I know Ill need an A1C, but is there anything else thats required? Thank you.     Kandace Pappas   [phone]

## 2020-07-29 NOTE — TELEPHONE ENCOUNTER
Response sent to patient:  You will also need to have a Lipid Panel and Urine Albumin test (both are due yearly for the program) by the end of the year (12/31/20). So if you would like to have those completed along with your 1st A1C test you will not have to worry about those later this year. If you do not complete those at this appointment you can complete them at your 2nd office visit along with your 2nd A1C. Here is a list of requirements that need to be completed for the Diabetes Management Program:  ? Requirements to be completed throughout year:   Take diabetes medication as prescribed as well as cholesterol (Statin) or high blood pressure (ACE/ARB), if needed. o 70% adherence is required of diabetes medications  o Provide documentation if cholesterol and/or high blood pressure medications are not needed.  Lipid panel (once yearly)   Urine albumin (once yearly)   Pneumonia Vaccination (as indicated)  ? Requirements to be completed between Jan. 1 and June 30:   Visit with your physician (First yearly visit)   Meet with a [address] [place] [address] pharmacist in person at a hospital location or by phone (This visit may be conducted prior to the start of the requirements period.)   First A1c  ? Requirements to be completed between July 1 and Dec. 31   Visit with your physician (Second yearly visit)   Second A1c   Flu vaccination   ? Requirements if A1c is greater than 8 percent:   Engage with a [facility]) diabetes educator at one of our hospital locations or an ambulatory care coordinator by phone, if your A1c is greater than 8 percent.

## 2020-08-04 ENCOUNTER — HOSPITAL ENCOUNTER (OUTPATIENT)
Dept: GENERAL RADIOLOGY | Facility: CLINIC | Age: 60
Discharge: HOME | End: 2020-08-06
Payer: COMMERCIAL

## 2020-08-04 ENCOUNTER — HOSPITAL ENCOUNTER (OUTPATIENT)
Facility: CLINIC | Age: 60
Discharge: HOME | End: 2020-08-06
Payer: COMMERCIAL

## 2020-08-04 ENCOUNTER — HOSPITAL ENCOUNTER (OUTPATIENT)
Age: 60
Setting detail: SPECIMEN
Discharge: HOME | End: 2020-08-04
Payer: COMMERCIAL

## 2020-08-04 ENCOUNTER — OFFICE VISIT (OUTPATIENT)
Dept: FAMILY MEDICINE CLINIC | Age: 60
End: 2020-08-04
Payer: COMMERCIAL

## 2020-08-04 VITALS
DIASTOLIC BLOOD PRESSURE: 69 MMHG | HEIGHT: 62 IN | TEMPERATURE: 96.5 F | HEART RATE: 83 BPM | BODY MASS INDEX: 37.36 KG/M2 | WEIGHT: 203 LBS | SYSTOLIC BLOOD PRESSURE: 104 MMHG

## 2020-08-04 LAB
ALBUMIN SERPL-MCNC: 4.1 G/DL (ref 3.5–5.2)
ALBUMIN/GLOBULIN RATIO: 1.5 (ref 1–2.5)
ALP BLD-CCNC: 73 U/L (ref 35–104)
ALT SERPL-CCNC: 30 U/L (ref 5–33)
ANION GAP SERPL CALCULATED.3IONS-SCNC: 14 MMOL/L (ref 9–17)
AST SERPL-CCNC: 21 U/L
BILIRUB SERPL-MCNC: 0.17 MG/DL (ref 0.3–1.2)
BILIRUBIN DIRECT: <0.08 MG/DL
BILIRUBIN, INDIRECT: ABNORMAL MG/DL (ref 0–1)
BUN BLDV-MCNC: 26 MG/DL (ref 6–20)
CALCIUM SERPL-MCNC: 9.4 MG/DL (ref 8.6–10.4)
CHLORIDE BLD-SCNC: 101 MMOL/L (ref 98–107)
CHOLESTEROL/HDL RATIO: 3.7
CHOLESTEROL: 220 MG/DL
CO2: 23 MMOL/L (ref 20–31)
CREAT SERPL-MCNC: 1.13 MG/DL (ref 0.5–0.9)
GFR AFRICAN AMERICAN: 60 ML/MIN
GFR NON-AFRICAN AMERICAN: 49 ML/MIN
GFR SERPL CREATININE-BSD FRML MDRD: ABNORMAL ML/MIN/{1.73_M2}
GFR SERPL CREATININE-BSD FRML MDRD: ABNORMAL ML/MIN/{1.73_M2}
GLUCOSE BLD-MCNC: 109 MG/DL (ref 70–99)
HBA1C MFR BLD: 7.5 %
HDLC SERPL-MCNC: 60 MG/DL
LDL CHOLESTEROL: 114 MG/DL (ref 0–130)
POTASSIUM SERPL-SCNC: 4.8 MMOL/L (ref 3.7–5.3)
SODIUM BLD-SCNC: 138 MMOL/L (ref 135–144)
TOTAL PROTEIN: 6.8 G/DL (ref 6.4–8.3)
TRIGL SERPL-MCNC: 232 MG/DL
TSH SERPL DL<=0.05 MIU/L-ACNC: 2.25 MIU/L (ref 0.3–5)
VLDLC SERPL CALC-MCNC: ABNORMAL MG/DL (ref 1–30)

## 2020-08-04 PROCEDURE — 73030 X-RAY EXAM OF SHOULDER: CPT

## 2020-08-04 PROCEDURE — 83036 HEMOGLOBIN GLYCOSYLATED A1C: CPT | Performed by: FAMILY MEDICINE

## 2020-08-04 PROCEDURE — 99213 OFFICE O/P EST LOW 20 MIN: CPT | Performed by: FAMILY MEDICINE

## 2020-08-04 PROCEDURE — 3051F HG A1C>EQUAL 7.0%<8.0%: CPT | Performed by: FAMILY MEDICINE

## 2020-08-04 RX ORDER — ZOSTER VACCINE RECOMBINANT, ADJUVANTED 50 MCG/0.5
0.5 KIT INTRAMUSCULAR SEE ADMIN INSTRUCTIONS
Qty: 0.5 ML | Refills: 0 | Status: SHIPPED | OUTPATIENT
Start: 2020-08-04 | End: 2021-01-31

## 2020-08-04 ASSESSMENT — ENCOUNTER SYMPTOMS
SORE THROAT: 0
SINUS PAIN: 0
VOMITING: 0
SHORTNESS OF BREATH: 0
ABDOMINAL PAIN: 0
CHEST TIGHTNESS: 0
DIARRHEA: 0
ABDOMINAL DISTENTION: 0
NAUSEA: 0
WHEEZING: 0
COLOR CHANGE: 0
SINUS PRESSURE: 0

## 2020-08-04 ASSESSMENT — PATIENT HEALTH QUESTIONNAIRE - PHQ9
2. FEELING DOWN, DEPRESSED OR HOPELESS: 0
SUM OF ALL RESPONSES TO PHQ QUESTIONS 1-9: 0
SUM OF ALL RESPONSES TO PHQ QUESTIONS 1-9: 0
1. LITTLE INTEREST OR PLEASURE IN DOING THINGS: 0
SUM OF ALL RESPONSES TO PHQ9 QUESTIONS 1 & 2: 0

## 2020-08-04 NOTE — PATIENT INSTRUCTIONS
Thank you for letting us take care of you today. We hope all your questions were addressed. If a question was overlooked or something else comes to mind after you return home, please contact a member of your Care Team listed below. Your Care Team at [place] [id number] is Team #1  Maryse Hills MD (Faculty)  Vane Palomino (Resident)  Ashley Uriostegui MD (Resident)  Angel Oshea., SHERRY Morillo, SHERRY  [facility] office)  Rikki Taveras, [address] [facility] (Clinical Practice Manager)  Yvon Robert, [address] [facility] (Clinical Pharmacist)     Office phone number: [phone]    If you need to get in right away due to illness, please be advised we have \"Same Day\" appointments available Monday-Friday. Please call us at [phone] option #3 to schedule your \"Same Day\" appointment. Patient Education        Frozen Shoulder: Exercises  Introduction  Here are some examples of exercises for you to try. The exercises may be suggested for a condition or for rehabilitation. Start each exercise slowly. Ease off the exercises if you start to have pain. You will be told when to start these exercises and which ones will work best for you. How to do the exercises  Neck stretches   1. Look straight ahead, and tip your right ear to your right shoulder. Do not let your left shoulder rise up as you tip your head to the right. 2. Hold 15 to 30 seconds. 3. Tilt your head to the left. Do not let your right shoulder rise up as you tip your head to the left. 4. Hold for 15 to 30 seconds. 5. Repeat 2 to 4 times to each side. Shoulder rolls   1. Sit comfortably with your feet shoulder-width apart. You can also do this exercise while standing. 2. Roll your shoulders up, then back, and then down in a smooth, circular motion. 3. Repeat 2 to 4 times. Shoulder flexion (lying down)   For this exercise, you will need a wand. To make a wand, use a piece of PVC pipe or a broom handle with the broom removed.  Make the wand about a foot wider than your shoulders. 1. Lie on your back, holding a wand with your hands. Your palms should face down as you hold the wand. Place your hands slightly wider than your shoulders. 2. Keeping your elbows straight, slowly raise your arms over your head until you feel a stretch in your shoulders, upper back, and chest.  3. Hold 15 to 30 seconds. 4. Repeat 2 to 4 times. Shoulder rotation (lying down)   To make a wand, use a piece of PVC pipe or a broom handle with the broom removed. Make the wand about a foot wider than your shoulders. 1. Lie on your back and hold a wand in both hands with your elbows bent and your palms up. 2. Keeping your elbows close to your body, move the wand across your body toward the arm that has pain. 3. Hold for 15 to 30 seconds. 4. Repeat 2 to 4 times. Shoulder internal rotation with towel   1. Roll up a towel lengthwise. Hold the towel above and behind your head with the arm that is not sore. 2. With your sore arm, reach behind your back and grasp the towel. 3. Using the arm above your head, pull the towel upward until you feel a stretch on the front and outside of your sore shoulder. 4. Hold 15 to 30 seconds. 5. Relax and move the towel back down to the starting position. 6. Repeat 2 to 4 times. Shoulder blade squeeze   1. While standing with your arms at your sides, squeeze your shoulder blades together. Do not raise your shoulders up as you are squeezing. 2. Hold for 6 seconds. 3. Repeat 8 to 12 times. Follow-up care is a key part of your treatment and safety. Be sure to make and go to all appointments, and call your doctor if you are having problems. It's also a good idea to know your test results and keep a list of the medicines you take. Where can you learn more? Go to [email or web address]://[email or web address].[email or web address] org and sign in to your [employer] account. Enter 0601 [id number] in the [employer] box to learn more about \"Frozen Shoulder: Exercises. \"     If you do not have an account, please click on the \"Sign Up Now\" link. Current as of: March 2, 2020               Content Version: 12.5  © 2006-2020 Healthwise, Incorporated. Care instructions adapted under license by [facility] [place] ([facility]). If you have questions about a medical condition or this instruction, always ask your healthcare professional. [address] any warranty or liability for your use of this information.

## 2020-08-04 NOTE — PROGRESS NOTES
2020     Anat Urrutia (:  1960) is a 62 y.o. female, here for evaluation of the following medical concerns:    HPI    Treatment Adherence:   Medication compliance:  compliant most of the time  Diet compliance:  compliant most of the time  Weight trend: stable  Current exercise: no regular exercise  Barriers: lack of motivation    Diabetes Mellitus Type 2: Current symptoms/problems include none. Home blood sugar records: patient does not test  Any episodes of hypoglycemia? no  Eye exam current (within one year): yes 1 week ago  Tobacco history: She  reports that she has never smoked. She has never used smokeless tobacco.   Daily Aspirin? Yes    Hypertension:  Home blood pressure monitoring: No.  She is adherent to a low sodium diet. Patient denies chest pain, shortness of breath, headache, lightheadedness, blurred vision, peripheral edema and palpitations. Antihypertensive medication side effects: no medication side effects noted. Use of agents associated with hypertension: none. Hyperlipidemia:  No new myalgias or GI upset on rosuvastatin (Crestor). The 10-year ASCVD risk score (Rosetta Benito, et al., 2013) is: 3.6%    Values used to calculate the score:      Age: 62 years      Sex: Female      Is Non- : No      Diabetic: Yes      Tobacco smoker: No      Systolic Blood Pressure: 467 mmHg      Is BP treated: Yes      HDL Cholesterol: 57 mg/dL      Total Cholesterol: 137 mg/dL    Left shoulder pain  Patient has chronic left shoulder pain that has worsened over the past several months. She denies any history of trauma or any falls. She has a desk job. It is associated with some neck pain. There is no numbness/tingling. She notes decreased strength with using the left shoulder, especially with overhead, reaching activities and also reaching behind her back. She has not had any XRAY's in the past before.  She has not tried PT or injections in the past.      Lab Results   Component Value Date    LABA1C 7.5 08/04/2020    LABA1C 7.4 09/26/2019    LABA1C 8.4 (H) 06/24/2019     Lab Results   Component Value Date    LABMICR CANNOT BE CALCULATED 12/13/2019    CREATININE 0.92 (H) 02/18/2019     Lab Results   Component Value Date    ALT 40 (H) 02/18/2019    AST 22 02/18/2019     Lab Results   Component Value Date    CHOL 137 06/24/2019    TRIG 190 (H) 06/24/2019    HDL 57 06/24/2019    LDLDIRECT 156 (H) 05/20/2017          Review of Systems   Constitutional: Negative for activity change, appetite change, fatigue and fever. HENT: Negative for congestion, sinus pressure, sinus pain and sore throat. Respiratory: Negative for chest tightness, shortness of breath and wheezing. Cardiovascular: Negative for chest pain, palpitations and leg swelling. Gastrointestinal: Negative for abdominal distention, abdominal pain, diarrhea, nausea and vomiting. Genitourinary: Negative for difficulty urinating, dyspareunia, dysuria, enuresis, flank pain and frequency. Musculoskeletal: Positive for neck pain. Negative for gait problem, joint swelling, myalgias and neck stiffness. Skin: Negative for color change, pallor, rash and wound. Neurological: Negative for dizziness, syncope, facial asymmetry, light-headedness and headaches. Psychiatric/Behavioral: Negative for self-injury and sleep disturbance. The patient is not nervous/anxious. Prior to Visit Medications    Medication Sig Taking?  Authorizing Provider   zoster recombinant adjuvanted vaccine [facility]) 50 MCG/0.5ML SUSR injection Inject 0.5 mLs into the muscle See Admin Instructions 1 dose now and repeat in 2-6 months Yes Melissa Hinton MD   repaglinide (PRANDIN) 1 MG tablet TAKE 1 TABLET BY MOUTH 3 TIMES A DAY BEFORE MEALS Yes Melissa Hinton MD   busPIRone (BUSPAR) 10 MG tablet Take 1 tab 3 times daily Yes Melissa Hinton MD   ASPIRIN ADULT LOW STRENGTH 81 MG EC tablet TAKE 1 TABLET BY MOUTH ONE TIME A DAY Yes [employer] Lola Lopez MD   TRULICITY 0.68 RH/6.0YV SOPN INJECT THE CONTENTS OF ONE SYRINGE UNDER THE SKIN ONCE WEEKLY Yes Chantale Morales MD   omeprazole (PRILOSEC) 20 MG delayed release capsule Take 1 capsule by mouth 2 times daily Yes Chantale Morales MD   rOPINIRole (REQUIP) 2 MG tablet TAKE TWO TABLETS BY MOUTH NIGHTLY Yes Chantale Morales MD   glipiZIDE (GLUCOTROL) 10 MG tablet TAKE 1 TABLET BY MOUTH 2 TIMES A DAY BEFORE MEALS (REPLACES GLYBURIDE) Yes Chantale Morales MD   ondansetron (ZOFRAN) 4 MG tablet Take 1 tablet by mouth 3 times daily as needed for Nausea or Vomiting Yes Chantale Morales MD   SYNJARDY 5-1000 MG TABS TAKE 1 TABLET BY MOUTH 2 TIMES A DAY Yes Chantale Morales MD   chlorthalidone (HYGROTON) 25 MG tablet TAKE 1 TABLET BY MOUTH ONE TIME A DAY Yes MD Preston Colin Lancets 28G MISC Use to test once daily and as needed as directed by provider Yes Chantale Morales MD   blood glucose test strips (PRODIGY NO CODING BLOOD GLUC) strip Use to test once daily and as needed as directed by provider Yes Chantale Morales MD   acetaminophen (ACETAMINOPHEN EXTRA STRENGTH) 500 MG tablet Take 1,000 mg by mouth daily as needed for Pain Yes Historical Provider, MD   ibuprofen (ADVIL;MOTRIN) 200 MG tablet Take 400 mg by mouth daily as needed for Pain Yes Historical Provider, MD   lisinopril (PRINIVIL;ZESTRIL) 40 MG tablet TAKE 1 TABLET BY MOUTH ONE TIME A DAY Yes Chantale Morales MD   bimatoprost (LUMIGAN) 0.01 % SOLN ophthalmic drops Place 1 drop into both eyes nightly Yes Historical Provider, MD   ezetimibe (ZETIA) 10 MG tablet Take 1 tablet by mouth daily Yes Chantale Morales MD   rosuvastatin (CRESTOR) 10 MG tablet TAKE 1 TABLET BY MOUTH ONE TIME A DAY Yes MD PRESTON Colin LANCETS 28G MISC 1 Bottle by Does not apply route three times daily Yes Chantale Morales MD   Blood Glucose Monitoring Suppl (PRODIGY AUTOCODE BLOOD GLUCOSE) w/Device KIT 1 Device by Does not apply route 3 times daily Yes Chantale Morales MD Social History     Tobacco Use    Smoking status: Never Smoker    Smokeless tobacco: Never Used   Substance Use Topics    Alcohol use: Yes     Alcohol/week: 0.0 standard drinks     Comment: rarely/ 4 times a year        Vitals:    08/04/20 1012   BP: 104/69  Comment: machine   Site: Left Upper Arm   Position: Sitting   Cuff Size: Large Adult   Pulse: 83   Temp: 96.5 °F (35.8 °C)   TempSrc: Temporal   Weight: 203 lb (92.1 kg)   Height: 5' 2\" (1.575 m)     Estimated body mass index is 37.13 kg/m² as calculated from the following:    Height as of this encounter: 5' 2\" (1.575 m). Weight as of this encounter: 203 lb (92.1 kg). Physical Exam  Vitals signs and nursing note reviewed. Constitutional:       Appearance: Normal appearance. Cardiovascular:      Rate and Rhythm: Normal rate and regular rhythm. Pulses: Normal pulses. Heart sounds: Normal heart sounds. Pulmonary:      Effort: Pulmonary effort is normal.      Breath sounds: Normal breath sounds. Abdominal:      Palpations: Abdomen is soft. Tenderness: There is no abdominal tenderness. Musculoskeletal:      Right shoulder: Normal.      Left shoulder: She exhibits decreased range of motion, tenderness, bony tenderness, pain and decreased strength. She exhibits no swelling, no effusion, no crepitus, no deformity, no laceration, no spasm and normal pulse. Right lower leg: No edema. Left lower leg: No edema. Neurological:      Mental Status: She is alert. ASSESSMENT/PLAN:  1. Type 2 diabetes mellitus without complication, without long-term current use of insulin (HCC)  - continue with current medications today  - POCT glycosylated hemoglobin (Hb A1C)  - Comprehensive Metabolic w/Bili Profile; Future    2. Encounter for screening mammogram for breast cancer  - [facility] Digital Screen Bilateral [[id number]]; Future    3. Screening for hyperlipidemia  -continue w/ Crestor  - Lipid Panel; Future    4.  Chronic left shoulder pain  - XR SHOULDER LEFT (MIN 2 VIEWS); Future  - We discussed the various treatment alternatives including anti-inflammatory medications, physical therapy, injections, further imaging studies and as a last resort surgery  -Dr. Reich S [facility] appointment tomorrow at [place] [id number]    5. Fatigue, unspecified type  - TSH; Future    6. Need for shingles vaccine  - zoster recombinant adjuvanted vaccine [facility]) 50 MCG/0.5ML SUSR injection; Inject 0.5 mLs into the muscle See Admin Instructions 1 dose now and repeat in 2-6 months  Dispense: 0.5 mL; Refill: 0      Return in about 3 months (around 11/4/2020), or if symptoms worsen or fail to improve, for DM. An electronic signature was used to authenticate this note.     --Christ Luu MD on 8/4/2020 at 11:14 AM

## 2020-08-05 ENCOUNTER — OFFICE VISIT (OUTPATIENT)
Dept: FAMILY MEDICINE CLINIC | Age: 60
End: 2020-08-05
Payer: COMMERCIAL

## 2020-08-05 ENCOUNTER — HOSPITAL ENCOUNTER (OUTPATIENT)
Age: 60
Setting detail: SPECIMEN
Discharge: HOME | End: 2020-08-05
Payer: COMMERCIAL

## 2020-08-05 VITALS
TEMPERATURE: 97.8 F | HEIGHT: 62 IN | BODY MASS INDEX: 37.36 KG/M2 | HEART RATE: 54 BPM | WEIGHT: 203 LBS | SYSTOLIC BLOOD PRESSURE: 112 MMHG | DIASTOLIC BLOOD PRESSURE: 67 MMHG

## 2020-08-05 LAB
ABSOLUTE EOS #: 0.82 K/UL (ref 0–0.44)
ABSOLUTE IMMATURE GRANULOCYTE: 0.03 K/UL (ref 0–0.3)
ABSOLUTE LYMPH #: 2.82 K/UL (ref 1.1–3.7)
ABSOLUTE MONO #: 0.73 K/UL (ref 0.1–1.2)
BASOPHILS # BLD: 1 % (ref 0–2)
BASOPHILS ABSOLUTE: 0.07 K/UL (ref 0–0.2)
C-REACTIVE PROTEIN: 7 MG/L (ref 0–5)
DIFFERENTIAL TYPE: ABNORMAL
EOSINOPHILS RELATIVE PERCENT: 8 % (ref 1–4)
HCT VFR BLD CALC: 39 % (ref 36.3–47.1)
HEMOGLOBIN: 12 G/DL (ref 11.9–15.1)
IMMATURE GRANULOCYTES: 0 %
LYMPHOCYTES # BLD: 29 % (ref 24–43)
MCH RBC QN AUTO: 28.5 PG (ref 25.2–33.5)
MCHC RBC AUTO-ENTMCNC: 30.8 G/DL (ref 28.4–34.8)
MCV RBC AUTO: 92.6 FL (ref 82.6–102.9)
MONOCYTES # BLD: 7 % (ref 3–12)
NRBC AUTOMATED: 0 PER 100 WBC
PDW BLD-RTO: 13.2 % (ref 11.8–14.4)
PLATELET # BLD: 366 K/UL (ref 138–453)
PLATELET ESTIMATE: ABNORMAL
PMV BLD AUTO: 10.7 FL (ref 8.1–13.5)
RBC # BLD: 4.21 M/UL (ref 3.95–5.11)
RBC # BLD: ABNORMAL 10*6/UL
RHEUMATOID FACTOR: <10 IU/ML
SEDIMENTATION RATE, ERYTHROCYTE: 14 MM (ref 0–30)
SEG NEUTROPHILS: 55 % (ref 36–65)
SEGMENTED NEUTROPHILS ABSOLUTE COUNT: 5.38 K/UL (ref 1.5–8.1)
WBC # BLD: 9.9 K/UL (ref 3.5–11.3)
WBC # BLD: ABNORMAL 10*3/UL

## 2020-08-05 PROCEDURE — 99203 OFFICE O/P NEW LOW 30 MIN: CPT | Performed by: FAMILY MEDICINE

## 2020-08-05 ASSESSMENT — PATIENT HEALTH QUESTIONNAIRE - PHQ9
SUM OF ALL RESPONSES TO PHQ QUESTIONS 1-9: 0
SUM OF ALL RESPONSES TO PHQ QUESTIONS 1-9: 0
SUM OF ALL RESPONSES TO PHQ9 QUESTIONS 1 & 2: 0
1. LITTLE INTEREST OR PLEASURE IN DOING THINGS: 0
2. FEELING DOWN, DEPRESSED OR HOPELESS: 0

## 2020-08-05 NOTE — PROGRESS NOTES
Sports Medicine Consultation    CHIEF COMPLAINT:  Shoulder Pain        HPI:   The patient is a 62 y.o. female who is being seen as a new patient being seen for regarding new problem - chronic shoulder pain L > R associated with neck stiffness, also associated with pains in multiple joints and muscular sites (B/L traps, small joints of hands and feet, anterior knees/thighs B/L). The patient is a right hand dominant female who has had shoulder pain for months. As far as trauma to the shoulder, the patient indicates no trauma. The pain is  worse at night and when doing overhead activities. Weakness of the shoulder has  been noted. The pain restricts activities such as brushing hair, brushing teeth, dressing/unhooking bra. The pain does not seem to improve with time. The following medications have been tried: NSAID/tylenol without benefit. Physical Therapy has not been tried. Corticosteroid injection has not been done. Neck pain has been present. Having prolonged stiffness in the morning >1 hr.  she has a past medical history of Cervical spondylosis, Hyperlipidemia, Hypertension, DIEGO (nonalcoholic steatohepatitis), Obesity, Osteoarthritis of left knee, Restless legs syndrome, and Type II or unspecified type diabetes mellitus without mention of complication, not stated as uncontrolled. she has a past surgical history that includes Upper gastrointestinal endoscopy (2012); Colonoscopy (2012);  section; and Hysterectomy, total abdominal ().     Past Medical History:   Diagnosis Date    Cervical spondylosis 2009    c-4 c-5, c-5 c-6, c-6 c-7    Hyperlipidemia     Hypertension     DIEGO (nonalcoholic steatohepatitis) 10/12/2014    Obesity     Osteoarthritis of left knee 2014    Restless legs syndrome     Type II or unspecified type diabetes mellitus without mention of complication, not stated as uncontrolled        Past Surgical History:   Procedure Laterality Date   SECTION      [facility]    HYSTERECTOMY, TOTAL ABDOMINAL  2008    Polymenorrhagia    UPPER GASTROINTESTINAL ENDOSCOPY  2012    [facility]       family history is not on file. Social History     Socioeconomic History    Marital status:      Spouse name: Not on file    Number of children: Not on file    Years of education: Not on file    Highest education level: Not on file   Occupational History    Not on file   Social Needs    Financial resource strain: Not on file    Food insecurity     Worry: Not on file     Inability: Not on file    Transportation needs     Medical: Not on file     Non-medical: Not on file   Tobacco Use    Smoking status: Never Smoker    Smokeless tobacco: Never Used   Substance and Sexual Activity    Alcohol use:  Yes     Alcohol/week: 0.0 standard drinks     Comment: rarely/ 4 times a year    Drug use: No    Sexual activity: Yes   Lifestyle    Physical activity     Days per week: Not on file     Minutes per session: Not on file    Stress: Not on file   Relationships    Social connections     Talks on phone: Not on file     Gets together: Not on file     Attends Voodoo service: Not on file     Active member of club or organization: Not on file     Attends meetings of clubs or organizations: Not on file     Relationship status: Not on file    Intimate partner violence     Fear of current or ex partner: Not on file     Emotionally abused: Not on file     Physically abused: Not on file     Forced sexual activity: Not on file   Other Topics Concern    Not on file   Social History Narrative    Not on file       Current Outpatient Medications   Medication Sig Dispense Refill    zoster recombinant adjuvanted vaccine (SHINGRIX) 50 MCG/0.5ML SUSR injection Inject 0.5 mLs into the muscle See Admin Instructions 1 dose now and repeat in 2-6 months 0.5 mL 0    repaglinide (PRANDIN) 1 MG tablet TAKE 1 TABLET BY MOUTH 3 TIMES A DAY BEFORE MEALS 90 tablet 2    busPIRone (BUSPAR) 10 MG tablet Take 1 tab 3 times daily 180 tablet 0    ASPIRIN ADULT LOW STRENGTH 81 MG EC tablet TAKE 1 TABLET BY MOUTH ONE TIME A DAY 30 tablet 2    TRULICITY 4.95 RK/4.5YL SOPN INJECT THE CONTENTS OF ONE SYRINGE UNDER THE SKIN ONCE WEEKLY 2 mL 2    omeprazole (PRILOSEC) 20 MG delayed release capsule Take 1 capsule by mouth 2 times daily 180 capsule 3    rOPINIRole (REQUIP) 2 MG tablet TAKE TWO TABLETS BY MOUTH NIGHTLY 180 tablet 0    glipiZIDE (GLUCOTROL) 10 MG tablet TAKE 1 TABLET BY MOUTH 2 TIMES A DAY BEFORE MEALS (REPLACES GLYBURIDE) 180 tablet 1    ondansetron (ZOFRAN) 4 MG tablet Take 1 tablet by mouth 3 times daily as needed for Nausea or Vomiting 30 tablet 0    SYNJARDY 5-1000 MG TABS TAKE 1 TABLET BY MOUTH 2 TIMES A  tablet 2    chlorthalidone (HYGROTON) 25 MG tablet TAKE 1 TABLET BY MOUTH ONE TIME A DAY 90 tablet 1    Prodigy Lancets 28G MISC Use to test once daily and as needed as directed by provider 100 each 3    blood glucose test strips (PRODIGY NO CODING BLOOD GLUC) strip Use to test once daily and as needed as directed by provider 100 each 3    acetaminophen (ACETAMINOPHEN EXTRA STRENGTH) 500 MG tablet Take 1,000 mg by mouth daily as needed for Pain      ibuprofen (ADVIL;MOTRIN) 200 MG tablet Take 400 mg by mouth daily as needed for Pain      lisinopril (PRINIVIL;ZESTRIL) 40 MG tablet TAKE 1 TABLET BY MOUTH ONE TIME A DAY 90 tablet 2    bimatoprost (LUMIGAN) 0.01 % SOLN ophthalmic drops Place 1 drop into both eyes nightly      ezetimibe (ZETIA) 10 MG tablet Take 1 tablet by mouth daily 90 tablet 3    rosuvastatin (CRESTOR) 10 MG tablet TAKE 1 TABLET BY MOUTH ONE TIME A DAY 90 tablet 3    PRODIGY LANCETS 28G MISC 1 Bottle by Does not apply route three times daily 100 each 3    Blood Glucose Monitoring Suppl (PRODIGY AUTOCODE BLOOD GLUCOSE) w/Device KIT 1 Device by Does not apply route 3 times daily 1 kit 0     No current facility-administered medications for this visit. Allergies:  sheis allergic to codeine. ROS:  CV:  Denies chest pain; palpitations; shortness of breath; swelling of feet, ankles; and loss of consciousness. CON: Denies fever and dizziness. ENT:  Denies hearing loss / ringing, ear infections hoarseness, and swallowing problems. RESP:  Denies chronic cough, spitting up blood, and asthma/wheezing. GI: Denies abdominal pain, change in bowel habits, nausea or vomiting, and blood in stools. :  Denies frequent urination, burning or painful urination, blood in the urine, and bladder incontinence. NEURO:  Denies headache, memory loss, sleep disturbance, and tremor or movement disorder. PHYSICAL EXAM:   /67 (Site: Right Upper Arm, Position: Sitting, Cuff Size: Large Adult) Comment: machine  Pulse 54   Temp 97.8 °F (36.6 °C) (Temporal)   Ht 5' 2\" (1.575 m)   Wt 203 lb (92.1 kg)   BMI 37.13 kg/m²   GENERAL: Eva Armijo is a 62 y.o. female who is alert and oriented and sitting comfortably in our office. SKIN:  Intact without rashes, lesions or ulcerations. No obvious deformity or swelling. NEURO: Musculoskeletal and axillary nerves intact to sensory and motor testing. EYES:  Extraocular muscles intact. MOUTH: Oral mucosa moist.  No perioral lesions. PULM:  Respirations unlabored and regular. VASC:  Capillary refill less than 3 seconds. Cervical spine ROM Reduced significantly  Spurlings: negative,     MSK:  Forward elevation 70 degrees, external rotation in neutral 80 degrees, abduction 75 degrees, internal rotation to 90. Supraspinatus 4/5   External rotators 4/5  Internal 5/5  Full Can negative   Empty Can negative   Neer's test negative   Quintana-Taco test. positive. Pain with cross body adduction positive. Anterior Labral Stress test negative. Speed's test positive   Audrain's test negative. Pain over anterolateral acromion positive.     Subscap liftoff positive. Belly press test negative. Pain over AC joint positive. Pain over traps/rhomboids positive. PSYCH:  Patient has good fund of knowledge and displays understanding of exam.    RADIOLOGY: L shoulder XR     1. Mild degenerative change of the left AC joint.  Mild calcific tendinitis. 2. No acute fracture or dislocation. IMPRESSION:     1. Polyarthritis    2. Adhesive capsulitis of left shoulder      PLAN:   We discussed some of the etiologies and natural histories of     ICD-10-CM    1. Polyarthritis  M13.0 Rheumatoid Factor     KAYLEN Screen With Reflex     C-Reactive Protein     Sedimentation Rate     CBC With Auto Differential   2. Adhesive capsulitis of left shoulder  M75.02      We discussed the various treatment alternatives including anti-inflammatory medications, physical therapy, injections, further imaging studies and as a last resort surgery. - Concern for Fibromyalgia, need to rule out inflammatory process with prolonged morning stiffness and small joint involvement  - F/u RF, KAYLEN, CRP, ESR, CBC w/ auto diff  - XR L shoulder reviewed with patient - mild calcific tendinitis  - Discussed shoulder injections in future, patient is considering    Return to clinic Return in about 8 weeks (around 9/30/2020) for f/u labs, shoulder pain, multiple joint pains. .    Please be aware portions of this note were completed using voice recognition software and unforeseen errors may have occurred    Electronically signed by Luis Chandler DO, FAOASM on 8/5/20 at 4:46 PM EDT

## 2020-08-05 NOTE — PROGRESS NOTES
Visit Information    Have you changed or started any medications since your last visit including any over-the-counter medicines, vitamins, or herbal medicines? no   Have you stopped taking any of your medications? Is so, why? -  no  Are you having any side effects from any of your medications? - no    Have you seen any other physician or provider since your last visit?  no   Have you had any other diagnostic tests since your last visit?  no   Have you been seen in the emergency room and/or had an admission in a hospital since we last saw you?  no   Have you had your routine dental cleaning in the past 6 months?  yes      Do you have an active MyChart account? If no, what is the barrier?   Yes    Patient Care Team:  Melissa Hinton MD as PCP - General (Family Medicine)  Melissa Hinton MD as PCP - [facility] Provider  Karen Herman MD as Consulting Physician (Gastroenterology)    Medical History Review  Past Medical, Family, and Social History reviewed and does contribute to the patient presenting condition    Health Maintenance   Topic Date Due    Hepatitis B vaccine (1 of 3 - Risk 3-dose series) 03/10/1980    Shingles Vaccine (1 of 2) 03/10/2011    Diabetic retinal exam  01/12/2017    Diabetic foot exam  12/01/2017    Creatinine monitoring  02/18/2020    Breast cancer screen  02/26/2020    Flu vaccine (1) 09/01/2020    Diabetic microalbuminuria test  12/13/2020    DTaP/Tdap/Td vaccine (2 - Tdap) 12/15/2020    A1C test (Diabetic or Prediabetic)  08/04/2021    Lipid screen  08/04/2021    Potassium monitoring  08/04/2021    Colon cancer screen colonoscopy  09/20/2022    Pneumococcal 0-64 years Vaccine  Completed    Hepatitis C screen  Completed    HIV screen  Completed    Hepatitis A vaccine  Aged Out    Hib vaccine  Aged Out    Meningococcal (ACWY) vaccine  Aged Out

## 2020-08-05 NOTE — PROGRESS NOTES
I performed a history and physical examination of the patient and discussed management with the resident. I reviewed the residents note and agree with the documented findings and plan of care. Any areas of disagreement are noted on the chart. I have personally evaluated this patient and have completed at least one if not all key elements of the E/M (history, physical exam, and MDM). Additional findings are as noted. I agree with the chief complaint, past medical history, past surgical history, allergies, medications, social and family history as documented unless otherwise noted below.      Electronically signed by Kimmy Hammonds DO on 8/5/2020 at 3:50 PM

## 2020-08-06 LAB — ANTI-NUCLEAR ANTIBODY (ANA): NEGATIVE

## 2020-08-19 ENCOUNTER — OFFICE VISIT (OUTPATIENT)
Dept: FAMILY MEDICINE CLINIC | Age: 60
End: 2020-08-19
Payer: COMMERCIAL

## 2020-08-19 VITALS
BODY MASS INDEX: 36.99 KG/M2 | SYSTOLIC BLOOD PRESSURE: 100 MMHG | DIASTOLIC BLOOD PRESSURE: 62 MMHG | WEIGHT: 201 LBS | TEMPERATURE: 97.1 F | HEIGHT: 62 IN

## 2020-08-19 PROCEDURE — 99213 OFFICE O/P EST LOW 20 MIN: CPT | Performed by: FAMILY MEDICINE

## 2020-08-19 RX ORDER — DULOXETIN HYDROCHLORIDE 30 MG/1
30 CAPSULE, DELAYED RELEASE ORAL DAILY
Qty: 90 CAPSULE | Refills: 1 | Status: SHIPPED | OUTPATIENT
Start: 2020-08-19 | End: 2020-09-29 | Stop reason: SINTOL

## 2020-08-19 ASSESSMENT — ENCOUNTER SYMPTOMS
ABDOMINAL PAIN: 0
DIARRHEA: 0
VOMITING: 0
CHEST TIGHTNESS: 0
WHEEZING: 0
BACK PAIN: 1
COUGH: 0
NAUSEA: 0
SHORTNESS OF BREATH: 0

## 2020-08-19 NOTE — PROGRESS NOTES
I performed a history and physical examination of the patient and discussed management with the resident. I reviewed the residents note and agree with the documented findings and plan of care. Any areas of disagreement are noted on the chart. I have personally evaluated this patient and have completed at least one if not all key elements of the E/M (history, physical exam, and MDM). Additional findings are as noted. I agree with the chief complaint, past medical history, past surgical history, allergies, medications, social and family history as documented unless otherwise noted below.      Electronically signed by Obed Blas DO on 8/19/2020 at 3:30 PM

## 2020-08-19 NOTE — PROGRESS NOTES
Subjective:      Patient ID: Jose Boyer is a 62 y.o. female. Pt presents for multiple joint/muscle aches, and to review inflammatory labs ordered at last visit  Pt reports continued pain, denies any new injury or exacerbation  Reports continued fatigue, depressed mood with low energy that affects sleep  Denies SI/HI    Discussed isolated slight elevation of CRP  Educated on the diagnosis of fibromyalgia, patient was already considering this for herself  Pt amenable to do trial of medication       Review of Systems   Constitutional: Positive for fatigue. Negative for appetite change, chills, fever and unexpected weight change. Respiratory: Negative for cough, chest tightness, shortness of breath and wheezing. Cardiovascular: Negative for chest pain, palpitations and leg swelling. Gastrointestinal: Negative for abdominal pain, diarrhea, nausea and vomiting. Genitourinary: Negative for difficulty urinating, dysuria, flank pain and frequency. Musculoskeletal: Positive for arthralgias, back pain, myalgias, neck pain and neck stiffness. Negative for gait problem and joint swelling. Skin: Negative for rash. Neurological: Negative for dizziness, syncope, weakness and headaches. Psychiatric/Behavioral: Positive for decreased concentration, dysphoric mood and sleep disturbance. Negative for self-injury and suicidal ideas. The patient is nervous/anxious. Objective:   Physical Exam  Vitals signs reviewed. Constitutional:       General: She is not in acute distress. Appearance: She is well-developed. She is not diaphoretic. HENT:      Head: Normocephalic and atraumatic. Cardiovascular:      Rate and Rhythm: Normal rate and regular rhythm. Heart sounds: Normal heart sounds. No murmur. No gallop. Pulmonary:      Effort: Pulmonary effort is normal. No respiratory distress. Breath sounds: Normal breath sounds. No wheezing or rales.    Abdominal:      General: Bowel sounds are

## 2020-08-25 RX ORDER — CHLORTHALIDONE 25 MG/1
TABLET ORAL
Qty: 90 TABLET | Refills: 1 | Status: SHIPPED | OUTPATIENT
Start: 2020-08-25 | End: 2021-03-08

## 2020-09-08 NOTE — TELEPHONE ENCOUNTER
Please address the medication refill and close the encounter. If I can be of assistance, please route to the applicable pool. Thank you. Last visit: 08/19/20  Last Med refill: 06/10/20  Does patient have enough medication for 72 hours: No:     Next Visit Date:  Future Appointments   Date Time Provider Nicola Zaidi   9/17/2020  8:30 AM Elena Sharp MD [address] [place] [address] Maintenance   Topic Date Due    Hepatitis B vaccine (1 of 3 - Risk 3-dose series) 03/10/1980    Shingles Vaccine (1 of 2) 03/10/2011    Diabetic retinal exam  01/12/2017    Diabetic foot exam  12/01/2017    Creatinine monitoring  02/18/2020    Breast cancer screen  02/26/2020    Flu vaccine (1) 09/01/2020    Diabetic microalbuminuria test  12/13/2020    DTaP/Tdap/Td vaccine (2 - Tdap) 12/15/2020    A1C test (Diabetic or Prediabetic)  08/04/2021    Lipid screen  08/04/2021    Potassium monitoring  08/04/2021    Colon cancer screen colonoscopy  09/20/2022    Pneumococcal 0-64 years Vaccine  Completed    Hepatitis C screen  Completed    HIV screen  Completed    Hepatitis A vaccine  Aged Out    Hib vaccine  Aged Out    Meningococcal (ACWY) vaccine  Aged Out       Hemoglobin A1C (%)   Date Value   08/04/2020 7.5   09/26/2019 7.4   06/24/2019 8.4 (H)             ( goal A1C is < 7)   Microalb/Crt.  Ratio (mcg/mg creat)   Date Value   12/13/2019 CANNOT BE CALCULATED     LDL Cholesterol (mg/dL)   Date Value   08/04/2020 114   06/24/2019 42       (goal LDL is <100)   AST (U/L)   Date Value   08/04/2020 21     ALT (U/L)   Date Value   08/04/2020 30     BUN (mg/dL)   Date Value   08/04/2020 26 (H)     BP Readings from Last 3 Encounters:   08/19/20 100/62   08/05/20 112/67   08/04/20 104/69          (goal 120/80)    All Future Testing planned in [facility]  Lab Frequency Next Occurrence   FORREST Digital Screen Bilateral [[id number]] Once 09/03/2020               Patient Active Problem List:     Cervical spondylosis     Type

## 2020-09-09 RX ORDER — DULAGLUTIDE 0.75 MG/.5ML
INJECTION, SOLUTION SUBCUTANEOUS
Qty: 2 ML | Refills: 2 | Status: SHIPPED
Start: 2020-09-09 | End: 2020-11-12 | Stop reason: DRUGHIGH

## 2020-09-14 ENCOUNTER — TELEPHONE (OUTPATIENT)
Dept: FAMILY MEDICINE CLINIC | Age: 60
End: 2020-09-14

## 2020-09-14 NOTE — TELEPHONE ENCOUNTER
Last Visit Date:  8-19-20  Next Visit Date:  9/17/2020    Hemoglobin A1C (%)   Date Value   08/04/2020 7.5   09/26/2019 7.4   06/24/2019 8.4 (H)             ( goal A1C is < 7)   Microalb/Crt.  Ratio (mcg/mg creat)   Date Value   12/13/2019 CANNOT BE CALCULATED     LDL Cholesterol (mg/dL)   Date Value   08/04/2020 114       (goal LDL is <100)   AST (U/L)   Date Value   08/04/2020 21     ALT (U/L)   Date Value   08/04/2020 30     BUN (mg/dL)   Date Value   08/04/2020 26 (H)     BP Readings from Last 3 Encounters:   08/19/20 100/62   08/05/20 112/67   08/04/20 104/69          (goal 120/80)        Patient Active Problem List:     Cervical spondylosis     Type II or unspecified type diabetes mellitus without mention of complication, not stated as uncontrolled     Hypertension     Abnormal auditory perception     Eustachian tube dysfunction     Chronic serous otitis media     Nasal polyps     Nasal congestion     Osteoarthritis of left knee     Osteoarthritis of right knee     DIEGO (nonalcoholic steatohepatitis)     Vitamin D deficiency     Iron deficiency anemia     Dyslipidemia     Diabetes mellitus type 2, uncontrolled (HCC)     Left hip pain     Trochanteric bursitis     Fatigue     Daytime somnolence     Snoring      ----Dayana Friday

## 2020-09-14 NOTE — TELEPHONE ENCOUNTER
Writer call patient wanted to asked if she could reschedule for Tuesday but did not get patient and could not leave a message.

## 2020-09-15 RX ORDER — ROPINIROLE 2 MG/1
TABLET, FILM COATED ORAL
Qty: 180 TABLET | Refills: 3 | Status: SHIPPED | OUTPATIENT
Start: 2020-09-15 | End: 2020-12-08 | Stop reason: SDUPTHER

## 2020-09-15 RX ORDER — BUSPIRONE HYDROCHLORIDE 10 MG/1
TABLET ORAL
Qty: 180 TABLET | Refills: 0 | Status: SHIPPED | OUTPATIENT
Start: 2020-09-15 | End: 2020-11-27

## 2020-09-15 NOTE — TELEPHONE ENCOUNTER
Please address the medication refill and close the encounter. If I can be of assistance, please route to the applicable pool. Thank you. Last visit: 08/19/20  Last Med refill: 07/09/20  Does patient have enough medication for 72 hours: No:     Next Visit Date:  Future Appointments   Date Time Provider Nicola Zaidi   9/17/2020  8:30 AM Melissa Hinton MD [address] [place] [address] Maintenance   Topic Date Due    Hepatitis B vaccine (1 of 3 - Risk 3-dose series) 03/10/1980    Shingles Vaccine (1 of 2) 03/10/2011    Diabetic retinal exam  01/12/2017    Diabetic foot exam  12/01/2017    Creatinine monitoring  02/18/2020    Breast cancer screen  02/26/2020    Flu vaccine (1) 09/01/2020    Diabetic microalbuminuria test  12/13/2020    DTaP/Tdap/Td vaccine (2 - Tdap) 12/15/2020    A1C test (Diabetic or Prediabetic)  08/04/2021    Lipid screen  08/04/2021    Potassium monitoring  08/04/2021    Colon cancer screen colonoscopy  09/20/2022    Pneumococcal 0-64 years Vaccine  Completed    Hepatitis C screen  Completed    HIV screen  Completed    Hepatitis A vaccine  Aged Out    Hib vaccine  Aged Out    Meningococcal (ACWY) vaccine  Aged Out       Hemoglobin A1C (%)   Date Value   08/04/2020 7.5   09/26/2019 7.4   06/24/2019 8.4 (H)             ( goal A1C is < 7)   Microalb/Crt.  Ratio (mcg/mg creat)   Date Value   12/13/2019 CANNOT BE CALCULATED     LDL Cholesterol (mg/dL)   Date Value   08/04/2020 114   06/24/2019 42       (goal LDL is <100)   AST (U/L)   Date Value   08/04/2020 21     ALT (U/L)   Date Value   08/04/2020 30     BUN (mg/dL)   Date Value   08/04/2020 26 (H)     BP Readings from Last 3 Encounters:   08/19/20 100/62   08/05/20 112/67   08/04/20 104/69          (goal 120/80)    All Future Testing planned in [facility]  Lab Frequency Next Occurrence   FORREST Digital Screen Bilateral [[id number]] Once 09/03/2020               Patient Active Problem List:     Cervical spondylosis     Type II or unspecified type diabetes mellitus without mention of complication, not stated as uncontrolled     Hypertension     Abnormal auditory perception     Eustachian tube dysfunction     Chronic serous otitis media     Nasal polyps     Nasal congestion     Osteoarthritis of left knee     Osteoarthritis of right knee     DIEGO (nonalcoholic steatohepatitis)     Vitamin D deficiency     Iron deficiency anemia     Dyslipidemia     Diabetes mellitus type 2, uncontrolled (HCC)     Left hip pain     Trochanteric bursitis     Fatigue     Daytime somnolence     Snoring

## 2020-09-25 RX ORDER — ASPIRIN 81 MG/1
TABLET ORAL
Qty: 30 TABLET | Refills: 2 | Status: SHIPPED | OUTPATIENT
Start: 2020-09-25 | End: 2020-12-28

## 2020-09-25 RX ORDER — LISINOPRIL 40 MG/1
TABLET ORAL
Qty: 90 TABLET | Refills: 2 | Status: SHIPPED | OUTPATIENT
Start: 2020-09-25 | End: 2021-06-09

## 2020-09-29 ENCOUNTER — HOSPITAL ENCOUNTER (OUTPATIENT)
Facility: CLINIC | Age: 60
Discharge: HOME | End: 2020-10-01
Payer: COMMERCIAL

## 2020-09-29 ENCOUNTER — OFFICE VISIT (OUTPATIENT)
Dept: FAMILY MEDICINE CLINIC | Age: 60
End: 2020-09-29
Payer: COMMERCIAL

## 2020-09-29 ENCOUNTER — HOSPITAL ENCOUNTER (OUTPATIENT)
Dept: GENERAL RADIOLOGY | Facility: CLINIC | Age: 60
Discharge: HOME | End: 2020-10-01
Payer: COMMERCIAL

## 2020-09-29 VITALS
HEIGHT: 63 IN | TEMPERATURE: 93.2 F | DIASTOLIC BLOOD PRESSURE: 65 MMHG | BODY MASS INDEX: 35.61 KG/M2 | HEART RATE: 85 BPM | SYSTOLIC BLOOD PRESSURE: 103 MMHG

## 2020-09-29 PROCEDURE — 90686 IIV4 VACC NO PRSV 0.5 ML IM: CPT | Performed by: FAMILY MEDICINE

## 2020-09-29 PROCEDURE — 73030 X-RAY EXAM OF SHOULDER: CPT

## 2020-09-29 PROCEDURE — 99211 OFF/OP EST MAY X REQ PHY/QHP: CPT | Performed by: FAMILY MEDICINE

## 2020-09-29 PROCEDURE — 99214 OFFICE O/P EST MOD 30 MIN: CPT | Performed by: FAMILY MEDICINE

## 2020-09-29 RX ORDER — ROSUVASTATIN CALCIUM 20 MG/1
20 TABLET, COATED ORAL NIGHTLY
Qty: 30 TABLET | Refills: 3 | Status: SHIPPED | OUTPATIENT
Start: 2020-09-29 | End: 2020-11-12 | Stop reason: SDUPTHER

## 2020-09-29 RX ORDER — CYCLOBENZAPRINE HCL 10 MG
10 TABLET ORAL NIGHTLY PRN
Qty: 30 TABLET | Refills: 0 | Status: SHIPPED | OUTPATIENT
Start: 2020-09-29 | End: 2020-10-28

## 2020-09-29 RX ORDER — AMITRIPTYLINE HYDROCHLORIDE 10 MG/1
10 TABLET, FILM COATED ORAL NIGHTLY
Qty: 90 TABLET | Refills: 0 | Status: SHIPPED | OUTPATIENT
Start: 2020-09-29 | End: 2020-11-12 | Stop reason: ALTCHOICE

## 2020-09-29 ASSESSMENT — PATIENT HEALTH QUESTIONNAIRE - PHQ9
SUM OF ALL RESPONSES TO PHQ9 QUESTIONS 1 & 2: 2
2. FEELING DOWN, DEPRESSED OR HOPELESS: 1
SUM OF ALL RESPONSES TO PHQ QUESTIONS 1-9: 2
1. LITTLE INTEREST OR PLEASURE IN DOING THINGS: 1
SUM OF ALL RESPONSES TO PHQ QUESTIONS 1-9: 2

## 2020-09-29 ASSESSMENT — ENCOUNTER SYMPTOMS
ABDOMINAL PAIN: 0
CHEST TIGHTNESS: 0
DIARRHEA: 0
SHORTNESS OF BREATH: 0
CONSTIPATION: 0

## 2020-09-29 NOTE — PATIENT INSTRUCTIONS
Thank you for letting us take care of you today. We hope all your questions were addressed. If a question was overlooked or something else comes to mind after you return home, please contact a member of your Care Team listed below. Your Care Team at [place] [address] is Team #5  Danielle Bear MD (Faculty)  Lois Roberts MD (Resident)  Ihsan Florian MD (Resident)  Rosa Maria Bello MD (Resident)  SHERRY Armando. ,CHIVO BROWN, RMA  [place] ([address] [facility] office)  [place] [facility] office)  Светлана Nair, [address] (Clinical Practice Manager)  Lilly oDe, [address] [place] [address] (Clinical Pharmacist)       Office phone number: [phone]    If you need to get in right away due to illness, please be advised we have \"Same Day\" appointments available Monday-Friday. Please call us at [phone] option #3 to schedule your \"Same Day\" appointment.

## 2020-09-29 NOTE — PROGRESS NOTES
Visit Information    Have you changed or started any medications since your last visit including any over-the-counter medicines, vitamins, or herbal medicines? no   Have you stopped taking any of your medications? Is so, why? -  no  Are you having any side effects from any of your medications? - no    Have you seen any other physician or provider since your last visit?  no   Have you had any other diagnostic tests since your last visit?  no   Have you been seen in the emergency room and/or had an admission in a hospital since we last saw you?  no   Have you had your routine dental cleaning in the past 6 months?  no     Do you have an active MyChart account? If no, what is the barrier?   Yes    Patient Care Team:  Sherice Amador MD as PCP - General (Family Medicine)  Sherice Amador MD as PCP - [facility]  Sangeeta Borrero MD as Consulting Physician (Gastroenterology)    Medical History Review  Past Medical, Family, and Social History reviewed and does contribute to the patient presenting condition    Health Maintenance   Topic Date Due    Hepatitis B vaccine (1 of 3 - Risk 3-dose series) 03/10/1980    Shingles Vaccine (1 of 2) 03/10/2011    Diabetic retinal exam  01/12/2017    Diabetic foot exam  12/01/2017    Creatinine monitoring  02/18/2020    Breast cancer screen  02/26/2020    Flu vaccine (1) 09/01/2020    Diabetic microalbuminuria test  12/13/2020    DTaP/Tdap/Td vaccine (2 - Tdap) 12/15/2020    A1C test (Diabetic or Prediabetic)  08/04/2021    Lipid screen  08/04/2021    Potassium monitoring  08/04/2021    Colon cancer screen colonoscopy  09/20/2022    Pneumococcal 0-64 years Vaccine  Completed    Hepatitis C screen  Completed    HIV screen  Completed    Hepatitis A vaccine  Aged Out    Hib vaccine  Aged Out    Meningococcal (ACWY) vaccine  Aged Out

## 2020-09-29 NOTE — PROGRESS NOTES
2020     Ness Quinones (:  1960) is a 62 y.o. female, here for evaluation of the following medical concerns:    HPI    for follow up of diabetes. . Current symptoms include: none. Patient denies hyperglycemia, hypoglycemia , nausea, polydipsia, polyuria, visual disturbances and weight loss. Evaluation to date has been: fasting blood sugar, fasting lipid panel, hemoglobin A1C and microalbuminuria. Home sugars: BGs have been labile ranging between 140 and 160 Fasting. Current treatments: no recent interventions. Last dilated eye exam in 1 month. Chronic Joint Pain:  Patient presents for follow-up of pain in multiple joints. Pain is unchanged. On average, pain is perceived as severe (6-8 pain scale). Change in quality of symptoms: no.  Current treatment: acetaminophen- Cymbalta, NA. Medication side effects: fatigue, sleepy/tremors after starting Cymbalta. Recent diagnostic testing: labs reviewed WNL. left shoulder pain. The symptoms began several weeks ago. Symptoms were initiated by no known event. Pain is located diffusely throughout the shoulder and now in upper middle arm. Discomfort is described as aching and sharp / stabbing. Symptoms are exacerbated by repetitive movements. Evaluation to date: none. Therapy to date includes nothing specific and avoidance of offending activity. Patient's medications, allergies, past medical, surgical, social and family histories were reviewed and updated as appropriate. Objective:      /65 (Site: Left Upper Arm, Position: Sitting, Cuff Size: Large Adult)   Pulse 85   Temp 93.2 °F (34 °C) (Temporal)   Ht 5' 3\" (1.6 m)   BMI 35.61 kg/m²   Left shoulder: Positive impingement sign  tender in mid upper arm to elbow.  ROM very limited   Right shoulder: Normal active ROM, no tenderness, no impingement sign       Patient's medications, allergies, past medical, surgical, social and family histories were reviewed and updated as appropriate. Review of Systems   Constitutional: Positive for fatigue. Negative for activity change and appetite change. Respiratory: Negative for chest tightness and shortness of breath. Gastrointestinal: Negative for abdominal pain, constipation and diarrhea. Genitourinary: Negative for difficulty urinating. Musculoskeletal:        L mid arm pain       Prior to Visit Medications    Medication Sig Taking?  Authorizing Provider   amitriptyline (ELAVIL) 10 MG tablet Take 1 tablet by mouth nightly Yes Jeannine Erickson MD   cyclobenzaprine (FLEXERIL) 10 MG tablet Take 1 tablet by mouth nightly as needed for Muscle spasms Yes Jeannine Erickson MD   rosuvastatin (CRESTOR) 20 MG tablet Take 1 tablet by mouth nightly Yes Jeannine Erickson MD   ASPIRIN ADULT LOW STRENGTH 81 MG EC tablet TAKE 1 TABLET BY MOUTH ONE TIME A DAY  Shefali Bobo MD   lisinopril (PRINIVIL;ZESTRIL) 40 MG tablet TAKE 1 TABLET BY MOUTH ONE TIME A DAY  Romeo Valladares MD   rOPINIRole (REQUIP) 2 MG tablet TAKE TWO TABLETS BY MOUTH NIGHTLY  Jeannine Erickson MD   busPIRone (BUSPAR) 10 MG tablet TAKE 1 TABLET BY MOUTH 3 TIMES A DAY  Jeannine Erickson MD   TRULICITY 8.76 DH/6.6DK SOPN INJECT THE CONTENTS OF ONE SYRINGE UNDER THE SKIN ONCE WEEKLY  Jeannine Erickson MD   chlorthalidone (HYGROTON) 25 MG tablet TAKE 1 TABLET BY MOUTH ONE TIME A DAY  Jeannine Erickson MD   zoster recombinant adjuvanted vaccine [facility]) 50 MCG/0.5ML SUSR injection Inject 0.5 mLs into the muscle See Admin Instructions 1 dose now and repeat in 2-6 months  Jeannine Erickson MD   repaglinide (PRANDIN) 1 MG tablet TAKE 1 TABLET BY MOUTH 3 TIMES A DAY BEFORE MEALS  Jeannine Erickson MD   omeprazole (PRILOSEC) 20 MG delayed release capsule Take 1 capsule by mouth 2 times daily  Jeannine Erickson MD   glipiZIDE (GLUCOTROL) 10 MG tablet TAKE 1 TABLET BY MOUTH 2 TIMES A DAY BEFORE MEALS (REPLACES GLYBURIDE)  Jeannine Erickson MD   ondansetron (ZOFRAN) 4 MG tablet Take 1 tablet by mouth 3 is soft. Tenderness: There is no abdominal tenderness. Musculoskeletal:      Left upper arm: She exhibits tenderness and bony tenderness. She exhibits no swelling, no edema, no deformity and no laceration. Right lower leg: No edema. Left lower leg: No edema. Lymphadenopathy:      Cervical: No cervical adenopathy. Neurological:      Mental Status: She is alert. Left shoulder: Positive impingement sign  tender in mid upper arm to elbow. ROM very limited   Right shoulder: Normal active ROM, no tenderness, no impingement sign      ASSESSMENT/PLAN:  1. Fibromyalgia- DC Cymbalta- Start Elavil 10 mg/Flexeril/PT  2. Dyslipidemia- Increase Crestor to 20 mg/ ALT in 4 weeks  3. Encounter for screening mammogram for breast cancer  4. DM- Addressed ADA diet. Trulicity increased to 1.40 mg  Encouraged aerobic exercise. Discussed foot care  - FORREST Digital Screen Bilateral   5. Left Mid arm pain- Musculo skeleta \\l Natural history and expected course discussed. Questions answered. Tylenol per medication orders. Plain film x-rays. Flu shot given      Return in about 6 weeks (around 11/10/2020) for R arm pain. An electronic signature was used to authenticate this note.     --Carmelina Leonardo MD on 9/29/2020 at 8:40 PM

## 2020-10-12 ENCOUNTER — HOSPITAL ENCOUNTER (OUTPATIENT)
Dept: PHYSICAL THERAPY | Age: 60
Setting detail: THERAPIES SERIES
Discharge: HOME | End: 2020-10-12
Payer: COMMERCIAL

## 2020-10-12 PROCEDURE — 97161 PT EVAL LOW COMPLEX 20 MIN: CPT

## 2020-10-12 PROCEDURE — 97112 NEUROMUSCULAR REEDUCATION: CPT

## 2020-10-12 PROCEDURE — 97110 THERAPEUTIC EXERCISES: CPT

## 2020-10-12 ASSESSMENT — PAIN DESCRIPTION - DESCRIPTORS: DESCRIPTORS: SORE

## 2020-10-12 ASSESSMENT — PAIN SCALES - GENERAL: PAINLEVEL_OUTOF10: 7

## 2020-10-12 ASSESSMENT — PAIN DESCRIPTION - FREQUENCY: FREQUENCY: INTERMITTENT

## 2020-10-12 ASSESSMENT — PAIN DESCRIPTION - ONSET: ONSET: ON-GOING

## 2020-10-12 ASSESSMENT — PAIN - FUNCTIONAL ASSESSMENT: PAIN_FUNCTIONAL_ASSESSMENT: PREVENTS OR INTERFERES SOME ACTIVE ACTIVITIES AND ADLS

## 2020-10-12 ASSESSMENT — PAIN DESCRIPTION - PROGRESSION: CLINICAL_PROGRESSION: GRADUALLY WORSENING

## 2020-10-12 ASSESSMENT — PAIN DESCRIPTION - PAIN TYPE: TYPE: CHRONIC PAIN

## 2020-10-12 ASSESSMENT — PAIN DESCRIPTION - ORIENTATION: ORIENTATION: LEFT;ANTERIOR

## 2020-10-12 ASSESSMENT — PAIN DESCRIPTION - LOCATION: LOCATION: SHOULDER

## 2020-10-13 ASSESSMENT — PAIN DESCRIPTION - PROGRESSION: CLINICAL_PROGRESSION: GRADUALLY WORSENING

## 2020-10-13 ASSESSMENT — PAIN DESCRIPTION - ORIENTATION: ORIENTATION: LEFT;ANTERIOR

## 2020-10-13 ASSESSMENT — PAIN DESCRIPTION - ONSET: ONSET: ON-GOING

## 2020-10-13 ASSESSMENT — PAIN DESCRIPTION - PAIN TYPE: TYPE: CHRONIC PAIN

## 2020-10-13 ASSESSMENT — PAIN DESCRIPTION - LOCATION: LOCATION: SHOULDER

## 2020-10-13 ASSESSMENT — PAIN DESCRIPTION - FREQUENCY: FREQUENCY: INTERMITTENT

## 2020-10-13 ASSESSMENT — PAIN SCALES - GENERAL: PAINLEVEL_OUTOF10: 7

## 2020-10-13 ASSESSMENT — PAIN - FUNCTIONAL ASSESSMENT: PAIN_FUNCTIONAL_ASSESSMENT: PREVENTS OR INTERFERES SOME ACTIVE ACTIVITIES AND ADLS

## 2020-10-13 ASSESSMENT — ACTIVITIES OF DAILY LIVING (ADL): EFFECT OF PAIN ON DAILY ACTIVITIES: 100% OF HER ADL'S

## 2020-10-13 ASSESSMENT — PAIN DESCRIPTION - DESCRIPTORS: DESCRIPTORS: SORE

## 2020-10-13 NOTE — PROGRESS NOTES
Physical Therapy  Initial Assessment  Date: 10/12/2020  Patient Name: Chato Flower  MRN: [id number]  : 1960     Treatment Diagnosis: Pain Weakness Left Shoulder. Restrictions  Restrictions/Precautions  Required Braces or Orthoses?: No    Subjective   General  Chart Reviewed: Yes  Patient assessed for rehabilitation services?: Yes  Response To Previous Treatment: Not applicable  Family / Caregiver Present: Yes  Referring Practitioner: Adán Toussaint  Referral Date : 20  Diagnosis: left shoulder pain   Follows Commands: Within Functional Limits  PT Visit Information  Onset Date: 06/15/20  PT Insurance Information: Medical [place]   Total # of Visits Approved: 12  Total # of Visits to Date: 1  Plan of Care/Certification Expiration Date: 20  No Show: 0  Progress Note Due Date: 10/30/20  Canceled Appointment: 0  Progress Note Counter:   Subjective  Subjective: \"The pain started in  or July of this summer. When the pain became so bad that I could not reach behind my back, I went to the doctor. \"   Pain Screening  Patient Currently in Pain: Yes  Pain Assessment  Pain Assessment: 0-10  Pain Level: 7  Patient's Stated Pain Goal: No pain  Pain Type: Chronic pain  Pain Location: Shoulder  Pain Orientation: Left; Anterior  Pain Descriptors: Sore  Pain Frequency: Intermittent  Pain Onset: On-going  Clinical Progression: Gradually worsening  Effect of Pain on Daily Activities: 100% of her ADL's   Functional Pain Assessment: Prevents or interferes some active activities and ADLs  Non-Pharmaceutical Pain Intervention(s): Ambulation/Increased Activity  Response to Pain Intervention: None  Vital Signs  Patient Currently in Pain: Yes  Patient Observation  Observations: Limited mobility of right foot and ankle due to pain.      Vision/Hearing  Vision  Vision: Within Functional Limits  Hearing  Hearing: Within functional limits    Orientation  Orientation  Overall Orientation Status: Within Normal Limits    Social/Functional History  Social/Functional History  Lives With: Spouse  Type of Home: Apartment  Home Layout: One level  Bathroom Shower/Tub: Tub/Shower unit;Curtain  Bathroom Toilet: Standard  Bathroom Accessibility: Accessible  ADL Assistance: Independent  Homemaking Assistance: Independent  Homemaking Responsibilities: Yes  Ambulation Assistance: Independent  Transfer Assistance: Independent  Active : Yes  Mode of Transportation: Car  Occupation: Full time employment  Type of occupation: Office   Leisure & Hobbies: read, walk, crafts     Objective  IADL History  Homemaking Responsibilities: Yes  Observation/Palpation  Posture: Fair  Palpation: light palpation of cervical spine is mildly painful   Observation: limited use of left arm and cervical ROM.       PROM RUE (degrees)  RUE PROM: WNL  AROM RUE (degrees)  RUE AROM : WNL  PROM LUE (degrees)  LUE PROM: Exceptions  LUE General PROM: painful in all directions with testing   L Shoulder Flex  0-170: 0-100  L Shoulder Ext  0-45: 0-25  L Shoulder ABduction 0-140: 0-80  L Shoulder Int Rotation  0-70: 0-50  L Shoulder Ext Rotation  0-90: 0-25  AROM LUE (degrees)  LUE AROM : Exceptions  LUE General AROM: painful with testing   L Shoulder Flexion 0-180: 0-80  L Shoulder Extension 0-45: 0-20  L Shoulder ABduction 0-180: 0-70  L Shoulder Int Rotation  0-70: 0-45  L Shoulder Ext Rotation  0-90: 0-15  Spine  Cervical: 50-75 % loss of AROM   Special Tests: open can Speeds (+) left UE     Strength RUE  Strength RUE: WFL  Strength LUE  Strength LUE: Exception  Comment: painful and weak   L Shoulder Flexion: 2/5  L Shoulder Internal Rotation: 2/5  L Shoulder External Rotation: 2/5  Strength Other  Other: Cerviacl spine 2/5 - loss of full AROM         Sensation  Overall Sensation Status: WNL   Balance  Posture: Good  Sitting - Static: Good  Sitting - Dynamic: Good  Standing - Static: Good  Standing - Dynamic: Fair     Ortho Screen  Posture: fair-poor - forward head and rounded shoulders     Assessment   Neurological  Neurological (WDL): Within Defined Limits  Conditions Requiring Skilled Therapeutic Intervention  Body structures, Functions, Activity limitations: Decreased functional mobility ; Decreased ADL status; Decreased ROM; Decreased strength;Decreased endurance;Decreased balance;Decreased high-level IADLs  Assessment: The patient should do fair with physical therapy to the left shoulder. (May also screen her for a lower back pain. )  Treatment Diagnosis: Pain weakness left shoulder. Prognosis: Good  Decision Making: Medium Complexity  REQUIRES PT FOLLOW UP: Yes  Treatment Initiated : HEP, see list above in the exercise section. Discharge Recommendations: Continue to assess pending progress  Activity Tolerance  Activity Tolerance: Patient limited by pain         Plan   Plan of care initiated    OutComes Score   OPTIMAL score 12/3 initial     Goals  Short term goals  Time Frame for Short term goals: 2 weeks   Short term goal 1: OPTIMAL score of 12/3 initial, her goal is 6/3. Short term goal 2: Worst pain is 10/10 at the time of her evaluation, her goal is 0/10. Short term goal 3: The patient will be independent with her home program. (HEP)  Long term goals  Time Frame for Long term goals : 4 weeks   Long term goal 1: OPTIMAL score of 3/3  at the time of her discharge.    Patient Goals   Patient goals : Use left arm without pain        Therapy Time   Individual Concurrent Group Co-treatment   Time In  1700         Time Out  1800         Minutes  60 min          Timed Code Treatment Minutes: 60 Minutes  Treatment Charges: Minutes Units   []  Ultrasound     []  Electrical-Stim     []  Iontophoresis     []  Traction     []  Massage       [x]  Eval 45 1   []  Gait     [x]  Ther Exercise  15  1   []  Manual Therapy       []  Ther Activities       []  Aquatics     []  Vasopneumatic Device     []  Neuro Re-Ed       []  Other       Total Treatment Time: 60 2        Rehab

## 2020-10-15 ENCOUNTER — HOSPITAL ENCOUNTER (OUTPATIENT)
Dept: PHYSICAL THERAPY | Age: 60
Setting detail: THERAPIES SERIES
Discharge: HOME | End: 2020-10-15
Payer: COMMERCIAL

## 2020-10-15 PROCEDURE — G0283 ELEC STIM OTHER THAN WOUND: HCPCS

## 2020-10-15 PROCEDURE — 97110 THERAPEUTIC EXERCISES: CPT

## 2020-10-15 PROCEDURE — 97140 MANUAL THERAPY 1/> REGIONS: CPT

## 2020-10-15 ASSESSMENT — PAIN SCALES - GENERAL: PAINLEVEL_OUTOF10: 6

## 2020-10-15 ASSESSMENT — PAIN DESCRIPTION - PAIN TYPE: TYPE: CHRONIC PAIN

## 2020-10-15 ASSESSMENT — PAIN DESCRIPTION - LOCATION: LOCATION: SHOULDER

## 2020-10-15 ASSESSMENT — PAIN DESCRIPTION - ONSET: ONSET: ON-GOING

## 2020-10-15 ASSESSMENT — PAIN DESCRIPTION - ORIENTATION: ORIENTATION: LEFT;ANTERIOR;PROXIMAL

## 2020-10-15 ASSESSMENT — PAIN DESCRIPTION - FREQUENCY: FREQUENCY: CONTINUOUS

## 2020-10-15 ASSESSMENT — PAIN DESCRIPTION - PROGRESSION: CLINICAL_PROGRESSION: GRADUALLY WORSENING

## 2020-10-15 ASSESSMENT — PAIN DESCRIPTION - DESCRIPTORS: DESCRIPTORS: BURNING;DULL;SHARP

## 2020-10-15 ASSESSMENT — PAIN - FUNCTIONAL ASSESSMENT: PAIN_FUNCTIONAL_ASSESSMENT: PREVENTS OR INTERFERES SOME ACTIVE ACTIVITIES AND ADLS

## 2020-10-19 ENCOUNTER — HOSPITAL ENCOUNTER (OUTPATIENT)
Dept: PHYSICAL THERAPY | Age: 60
Setting detail: THERAPIES SERIES
Discharge: HOME | End: 2020-10-19
Payer: COMMERCIAL

## 2020-10-19 PROCEDURE — G0283 ELEC STIM OTHER THAN WOUND: HCPCS

## 2020-10-19 PROCEDURE — 97110 THERAPEUTIC EXERCISES: CPT

## 2020-10-19 ASSESSMENT — PAIN DESCRIPTION - PAIN TYPE
TYPE: CHRONIC PAIN
TYPE: CHRONIC PAIN

## 2020-10-19 ASSESSMENT — PAIN DESCRIPTION - ORIENTATION
ORIENTATION: LEFT;ANTERIOR
ORIENTATION: LEFT;ANTERIOR

## 2020-10-19 ASSESSMENT — PAIN SCALES - GENERAL
PAINLEVEL_OUTOF10: 5
PAINLEVEL_OUTOF10: 6

## 2020-10-19 ASSESSMENT — PAIN DESCRIPTION - DESCRIPTORS
DESCRIPTORS: BURNING;ACHING;SHARP;SHOOTING
DESCRIPTORS: BURNING;DULL;SHARP

## 2020-10-19 ASSESSMENT — PAIN DESCRIPTION - FREQUENCY
FREQUENCY: CONTINUOUS
FREQUENCY: CONTINUOUS

## 2020-10-19 ASSESSMENT — PAIN - FUNCTIONAL ASSESSMENT: PAIN_FUNCTIONAL_ASSESSMENT: PREVENTS OR INTERFERES SOME ACTIVE ACTIVITIES AND ADLS

## 2020-10-19 ASSESSMENT — PAIN DESCRIPTION - PROGRESSION: CLINICAL_PROGRESSION: GRADUALLY WORSENING

## 2020-10-19 ASSESSMENT — PAIN DESCRIPTION - ONSET: ONSET: ON-GOING

## 2020-10-19 ASSESSMENT — PAIN DESCRIPTION - LOCATION
LOCATION: SHOULDER
LOCATION: SHOULDER

## 2020-10-19 NOTE — PROGRESS NOTES
(degrees)  LUE AROM : Exceptions  LUE General AROM: painful with testing   L Shoulder Flexion 0-180: 0-80  L Shoulder Extension 0-45: 0-20  L Shoulder ABduction 0-180: 0-70  L Shoulder Int Rotation  0-70: 0-45  L Shoulder Ext Rotation  0-90: 0-15  Spine  Cervical: 50-75 % loss of AROM   Special Tests: open can Speeds (+) left UE   Strength RUE  Strength RUE: WFL  Strength LUE  Strength LUE: Exception  Comment: painful and weak   L Shoulder Flexion: 2/5  L Shoulder Internal Rotation: 2/5  L Shoulder External Rotation: 2/5  Strength Other  Other: Cerviacl spine 2/5 - loss of full AROM     Exercises  Exercise 1: pulley 3 min   Exercise 2: wall walk 3 x   Exercise 3: table slides   Exercise 4: codman's CW/CCW  Exercise 5: Ball on wall   Exercise 6: PROM L shoulder   Exercise 7: IFC+ HP 15 min            Ortho Screen  Posture: fair-poor - forward head and rounded shoulders      Assessment:   Conditions Requiring Skilled Therapeutic Intervention  Body structures, Functions, Activity limitations: Decreased functional mobility ; Decreased ADL status; Decreased ROM; Decreased strength;Decreased endurance;Decreased balance;Decreased high-level IADLs  Assessment: The patient is getting stated on a concentrated program for her left shoulder. Strengthing and improved AROM should help to reduce her symptoms . The use of IFC and heat at the conclusion of her treatment should make the stress from exercise bearable. Treatment Diagnosis: Pain weakness left shoulder. Prognosis: Good  Decision Making: Medium Complexity  REQUIRES PT FOLLOW UP: Yes  Treatment Initiated : HEP, see list above in the exercise section. Discharge Recommendations: Continue to assess pending progress  Activity Tolerance  Activity Tolerance: Patient limited by pain               Goals:  Short term goals  Time Frame for Short term goals: 2 weeks   Short term goal 1: OPTIMAL score of 12/3 initial, her goal is 6/3.   Short term goal 2: Worst pain is 10/10 at the time of her evaluation, her goal is 0/10. Short term goal 3: The patient will be independent with her home program. (HEP)  Long term goals  Time Frame for Long term goals : 4 weeks   Long term goal 1: OPTIMAL score of 3/3  at the time of her discharge.    Patient Goals   Patient goals : Use left arm without pain     Plan:     Continue  Timed Code Treatment Minutes: 45 Minutes  Total Treatment Time: 45  Frequency and duration of TX  Days: 2  Weeks: 6     Therapy Time   Individual Concurrent Group Co-treatment   Time In  1645         Time Out  7391         Minutes  45 min          Timed Code Treatment Minutes: 45 Minutes      Treatment Charges: Minutes Units   []  Ultrasound     [x]  Electrical-Stim 15 1   []  Iontophoresis     []  Traction     []  Massage       []  Eval     []  Gait     [x]  Ther Exercise 30  2    []  Manual Therapy       []  Ther Activities       []  Aquatics     []  Vasopneumatic Device     []  Neuro Re-Ed       []  Other       Total Treatment Time: 39 3            Keila Child, PT

## 2020-10-20 NOTE — PROGRESS NOTES
[facility]   Outpatient Physical Therapy  [address] [place] [address]. [address]  Phone: [phone]  Fax: [phone]  Daily Progress Note    Date: 10/19/20    Patient Name: Benny Holt        MRN: [id number]  Account: [de-identified] : 1960       10/19/20 1536   General   Chart Reviewed Yes   Patient assessed for rehabilitation services? Yes   Referring Practitioner Neetu Hamilton MD   Referral Date  20   Diagnosis left shoulder pain    Follows Commands [facility]   PT Visit Information   Onset Date 06/15/20   PT Insurance Information Medical [place]    Total # of Visits Approved 12   Total # of Visits to Date 3   Plan of Care/Certification Expiration Date 20   No Show 0   Progress Note Due Date 10/30/20   Canceled Appointment 0   Progress Note Counter 3/6   Subjective   Subjective Pt notes having a good day so far. denies any issues after last visit. Pain Screening   Patient Currently in Pain Yes   Pain Assessment   Pain Assessment 0-10   Pain Level 5   Patient's Stated Pain Goal No pain   Pain Type Chronic pain   Pain Location Shoulder   Pain Orientation Left; Anterior   Pain Descriptors Burning;Aching; Omid Leo; Shooting   Pain Frequency Continuous   Exercises   Exercise 1 pulley 3 min    Exercise 3 table slides    Exercise 4 codmans CW/CCW   Exercise 5 Ball on wall    Exercise 6 PROM L shoulder    Exercise 7 IFC+ HP 15 min    Exercise 8 scapular retractions 5\" hold x10 reps   Exercise 9 Bicep curls x10 reps  (neutral position)   Conditions Requiring Skilled Therapeutic Intervention   Body structures, Functions, Activity limitations Decreased functional mobility ; Decreased ADL status; Decreased ROM; Decreased strength;Decreased endurance;Decreased balance;Decreased high-level IADLs   Treatment Diagnosis Pain weakness left shoulder. Prognosis Good   REQUIRES PT FOLLOW UP Yes   Activity Tolerance   Activity Tolerance Patient limited by pain   Comments Continued limitations due to pain.

## 2020-10-21 ENCOUNTER — HOSPITAL ENCOUNTER (OUTPATIENT)
Dept: PHYSICAL THERAPY | Age: 60
Setting detail: THERAPIES SERIES
Discharge: HOME | End: 2020-10-21
Payer: COMMERCIAL

## 2020-10-21 NOTE — FLOWSHEET NOTE
[address]   Outpatient Physical Therapy  Cancel/No Show Note    Date: 10/21/20    Patient Name: Juju Castorena        MRN: [id number]  Account: [de-identified] : 1960      General Information:  Referring Practitioner: Ramana Telles  Referral Date : 20  Diagnosis: left [place]oulder pain   Follows Commands: Within Functional Limits  Onset Date: 06/15/20  PT Insurance Information: Medical [place]   Total # of Visits Approved: 12  Total # of Visits to Date: 3  Plan of Care/Certification Expiration Date: 20  No Show: 0  Canceled Appointment: 1        Comments: Pt cancelled today due to a family emergency.     Jayne Meyers, PTA

## 2020-10-26 ENCOUNTER — HOSPITAL ENCOUNTER (OUTPATIENT)
Dept: PHYSICAL THERAPY | Age: 60
Setting detail: THERAPIES SERIES
Discharge: HOME | End: 2020-10-26
Payer: COMMERCIAL

## 2020-10-26 PROCEDURE — 97110 THERAPEUTIC EXERCISES: CPT

## 2020-10-26 PROCEDURE — G0283 ELEC STIM OTHER THAN WOUND: HCPCS

## 2020-10-26 ASSESSMENT — PAIN DESCRIPTION - DESCRIPTORS: DESCRIPTORS: BURNING;ACHING

## 2020-10-26 ASSESSMENT — PAIN DESCRIPTION - PAIN TYPE: TYPE: CHRONIC PAIN

## 2020-10-26 ASSESSMENT — PAIN DESCRIPTION - ORIENTATION: ORIENTATION: LEFT;ANTERIOR

## 2020-10-26 ASSESSMENT — PAIN SCALES - GENERAL: PAINLEVEL_OUTOF10: 5

## 2020-10-26 ASSESSMENT — PAIN DESCRIPTION - LOCATION: LOCATION: SHOULDER

## 2020-10-26 ASSESSMENT — PAIN DESCRIPTION - FREQUENCY: FREQUENCY: CONTINUOUS

## 2020-10-27 NOTE — PROGRESS NOTES
[address]   Outpatient Physical Therapy  [address] [facility]. [address]  Phone: [phone]  Fax: [phone]  Daily Progress Note    Date: 10/26/20    Patient Name: Juanita Cross        MRN: [id number]  Account: [de-identified] : 1960         10/26/20 7571   General   Chart Reviewed Yes   Patient assessed for rehabilitation services? Yes   Referring Practitioner Bella Hamilton MD   Referral Date  20   Diagnosis left shoulder pain    Follows Commands [facility]   PT Visit Information   Onset Date 06/15/20   PT Insurance Information Medical [place]    Total # of Visits Approved 12   Total # of Visits to Date 4   No Show 0   Canceled Appointment 1   Progress Note Counter /   Subjective   Subjective Pt notes having a busy week and unable to do much stretching/exercises. notes some increased discomfort and not much change in symptoms at this time. Pain Screening   Patient Currently in Pain Yes   Pain Assessment   Pain Assessment 0-10   Pain Level 5   Patient's Stated Pain Goal No pain   Pain Type Chronic pain   Pain Location Shoulder   Pain Orientation Left; Anterior   Pain Descriptors Burning;Aching   Pain Frequency Continuous   Pain Radiating Towards Bicep groove   Exercises   Exercise 1 pulley 3 min    Exercise 3 table slides x20 Scap/Flex   Exercise 4 codmans CW/CCW   Exercise 5 Ball on wall    Exercise 6 PROM L shoulder    Exercise 7 IFC+ HP 15 min    Exercise 8 scapular retractions 5\" hold x10 reps   Exercise 9 Bicep curls x10 reps   Conditions Requiring Skilled Therapeutic Intervention   Body structures, Functions, Activity limitations Decreased functional mobility ; Decreased ADL status; Decreased ROM; Decreased strength;Decreased endurance;Decreased balance;Decreased high-level IADLs   Treatment Diagnosis Pain weakness left shoulder.     Prognosis Good   REQUIRES PT FOLLOW UP Yes   Activity Tolerance   Activity Tolerance Patient limited by pain   Comments Still very limited with shoulder movement to and above shoulder level. pain in Biceps groove of left shoulder.    Plan   Plan Continue with current plan     Therapy Time:        10/26/20 0811   PT Individual Minutes   Time In 1645   Time Out 5704   Minutes 45     Treatment Charges: Minutes Units   []  Ultrasound     [x]  Electrical-Stim 15 1   []  Iontophoresis     []  Traction     []  Massage       []  Eval     []  Gait     []  Vasopneumatic Device     [x]  Ther Exercise 30 2    []  Manual Therapy       []  Ther Activities       []  Aquatics     []  Neuro Re-Ed       [x]  Other  15' 0   Total Treatment Time: 39  3     Nick Rodríguez, PTA

## 2020-10-28 ENCOUNTER — HOSPITAL ENCOUNTER (OUTPATIENT)
Dept: PHYSICAL THERAPY | Age: 60
Setting detail: THERAPIES SERIES
Discharge: HOME | End: 2020-10-28
Payer: COMMERCIAL

## 2020-10-28 PROCEDURE — 97110 THERAPEUTIC EXERCISES: CPT

## 2020-10-28 PROCEDURE — 97016 VASOPNEUMATIC DEVICE THERAPY: CPT

## 2020-10-28 PROCEDURE — G0283 ELEC STIM OTHER THAN WOUND: HCPCS

## 2020-10-28 RX ORDER — CYCLOBENZAPRINE HCL 10 MG
10 TABLET ORAL NIGHTLY PRN
Qty: 30 TABLET | Refills: 0 | Status: SHIPPED | OUTPATIENT
Start: 2020-10-28 | End: 2020-11-12 | Stop reason: SDUPTHER

## 2020-10-28 ASSESSMENT — PAIN DESCRIPTION - LOCATION: LOCATION: SHOULDER

## 2020-10-28 ASSESSMENT — PAIN DESCRIPTION - FREQUENCY: FREQUENCY: CONTINUOUS

## 2020-10-28 ASSESSMENT — PAIN DESCRIPTION - DIRECTION: RADIATING_TOWARDS: THE (L) ELBOW

## 2020-10-28 ASSESSMENT — PAIN SCALES - GENERAL: PAINLEVEL_OUTOF10: 8

## 2020-10-28 ASSESSMENT — PAIN DESCRIPTION - ONSET: ONSET: ON-GOING

## 2020-10-28 ASSESSMENT — PAIN DESCRIPTION - ORIENTATION: ORIENTATION: LEFT;ANTERIOR

## 2020-10-28 ASSESSMENT — PAIN DESCRIPTION - PAIN TYPE: TYPE: CHRONIC PAIN

## 2020-10-28 ASSESSMENT — PAIN DESCRIPTION - PROGRESSION: CLINICAL_PROGRESSION: GRADUALLY WORSENING

## 2020-10-28 ASSESSMENT — PAIN DESCRIPTION - DESCRIPTORS: DESCRIPTORS: CONSTANT;ACHING

## 2020-10-28 NOTE — TELEPHONE ENCOUNTER
E-scribe request for cyclobenzaprine . Please review and e-scribe if applicable. Last Visit Date:  09/29/2020  Next Visit Date:  11/12/2020    Hemoglobin A1C (%)   Date Value   08/04/2020 7.5   09/26/2019 7.4   06/24/2019 8.4 (H)             ( goal A1C is < 7)   Microalb/Crt.  Ratio (mcg/mg creat)   Date Value   12/13/2019 CANNOT BE CALCULATED     LDL Cholesterol (mg/dL)   Date Value   08/04/2020 114       (goal LDL is <100)   AST (U/L)   Date Value   08/04/2020 21     ALT (U/L)   Date Value   08/04/2020 30     BUN (mg/dL)   Date Value   08/04/2020 26 (H)     BP Readings from Last 3 Encounters:   09/29/20 103/65   08/19/20 100/62   08/05/20 112/67          (goal 120/80)        Patient Active Problem List:     Cervical spondylosis     Type II or unspecified type diabetes mellitus without mention of complication, not stated as uncontrolled     Hypertension     Abnormal auditory perception     Eustachian tube dysfunction     Chronic serous otitis media     Nasal polyps     Nasal congestion     Osteoarthritis of left knee     Osteoarthritis of right knee     DIEGO (nonalcoholic steatohepatitis)     Vitamin D deficiency     Iron deficiency anemia     Dyslipidemia     Diabetes mellitus type 2, uncontrolled (HCC)     Left hip pain     Trochanteric bursitis     Fatigue     Daytime somnolence     Snoring      ----Josh Pulliam

## 2020-10-28 NOTE — PROGRESS NOTES
[facility] Exercise Log  Protocol Fibromyalgia    Date of Eval: 08/28/2020       Primary PT: Tyshawn Scott PT, DPT  Diagnosis: Fibromyalgia/(L) shoulder pain   Things to Focus On (goals): Pain Control, ROM   Surgical Precautions:  Medical Precautions: DM, hyperlipidemia, hypertension, restless leg syndrome, obesity  [] C-9 dates  [] Occ Med   [] Medicare     Name: Dallas Freedman   Date        Visit #        Walk F/L/R        LE Exercise        Marching        Squats        Step-Ups F/L        Heel-toe raises        SLR F/L/R        HS Curl        Lunges        Knee flex/ext        UE exercises        Shoulder Rolls        Shoulder shrugs        Horiz abd/add        Fwd flex        Abd/add        PNF        Bicep Curls        IR/ER        Wall Push-Ups                Kickboard Ex. ROM w/ kickboard        Iso Abd.         Push-pull        Paddling        Breast-stroke        Rope pull                UE Stretches        Corner stretch        Post. Capsule stretch        LE Stretches        Hamstring        Achilles        Quad        C-Spine Stretches        Upper trap        Chin tucks        Cerv ROM        Deep H2O        Cycling        Jacks        Cross-country        Hang                                                        Pain Rating

## 2020-10-28 NOTE — PROGRESS NOTES
Physical Therapy  Progress Note Update  Date: 10/28/2020  Patient Name: Zeeshan Young  MRN: [id number]  : 1960     Treatment Diagnosis: (L) shoulder pain with mobility deficits    General  Chart Reviewed: Yes  Patient assessed for rehabilitation services?: Yes  Response To Previous Treatment: Not applicable  Family / Caregiver Present: No  Referring Practitioner: Víctor Becerril  Referral Date : 20  Diagnosis: Left shoulder pain  Follows Commands: Within Functional Limits  PT Visit Information  Onset Date: 06/15/20  PT Insurance Information: Medical [place]   Total # of Visits Approved: 12  Total # of Visits to Date: 5  No Show: 0  Canceled Appointment: 1    Subjective  Pain Screening  Patient Currently in Pain: Yes  Pain Assessment  Pain Assessment: 0-10  Pain Level: 8  Patient's Stated Pain Goal: No pain  Pain Type: Chronic pain  Pain Location: Shoulder  Pain Orientation: Left; Anterior  Pain Radiating Towards: the (L) elbow  Pain Descriptors: Constant; Aching  Pain Frequency: Continuous  Pain Onset: On-going  Clinical Progression: Gradually worsening  Non-Pharmaceutical Pain Intervention(s): Repositioned; Rest  Response to Pain Intervention: None  Multiple Pain Sites: No  Vision/Hearing  Vision  Vision: Within Functional Limits  Hearing  Hearing: Within functional limits  Orientation  Overall Orientation Status: Within Normal Limits  Social/Functional History  Lives With: Spouse  Type of Home: House  Home Layout: One level  Bathroom Shower/Tub: Tub/Shower unit;Curtain  Bathroom Toilet: Standard  ADL Assistance: Independent  Homemaking Assistance: Independent  Homemaking Responsibilities: Yes  Ambulation Assistance: Independent  Transfer Assistance: Independent  Active : Yes  Mode of Transportation: Car  Occupation: Full time employment  Type of occupation: Office   Leisure & Hobbies: read, walk, crafts     Objective  Observation/Palpation  Palpation: Tenderness to the touch throughout the (L) shoulder. Activity Tolerance  Patient limited by pain    Plan  Times per week: 2x  Plan weeks: 12 visits  Specific instructions for Next Treatment: Aquatics  Current Treatment Recommendations: Aquatics, Patient/Caregiver Education & Training, ROM, Home Exercise Program  Plan Comment: Going to discontinue the land based exercise program due to poor tolerance and lack of success. Initiate an aquatic program for pain control and ROM    Goals  Short term goals  Time Frame for Short term goals: 2 weeks   Short term goal 1: OPTIMAL score of 12/3 initial, her goal is 6/3. (Not Met)  Short term goal 2: Worst pain is 10/10 at the time of her evaluation, her goal is 0/10. (Not Met)  Short term goal 3: The patient will be independent with her home program. (HEP) (Not Met)  Long term goals  Time Frame for Long term goals : 4 weeks   Long term goal 1: OPTIMAL score of 3/3  at the time of her discharge. (Not Met)  Patient Goals   Patient goals : Use left arm without pain     Patient demonstrated improvement from condition with _0_ of_3__ short term goals   Patient demonstrated improvement from condition with _0_ of_1__ long term goals     Comments/Function: Function relatively unchanged since her initial evaluation. PT Education: [] Home Exercise Program  Comprehension [] Good [] Fair [] Poor  [x] Plans/Goals developed/discussed with pt/family [] Other    Recommendations: Continue with current prescription/transition to an aquatic program for pain control and ROM for her (L) shoulder.     [] Patient is Discharged At This Time Unless Further Orders Are Received. New Script Needed To Continue Treatment: [x] No   [] Yes  (visits left on current prescription:       7        ) Please sign orders at the bottom of this page and return by faxing. Thank you.      Current Treatment:  [x] Therapeutic Exercise    [] Modalities                [] Work Conditioning  [] Therapeutic Activity    [] Ultrasound  [x] Electrical Stimulation  [] Gait Training     [] Massage       [] Lumbar/Cervical Traction  [] Neuromuscular Re-education [] Cold/hot pack [] Iontophoresis: 4 mg/mL  [x] Instruction in Home Exercise Program                     Dexamethasone Sodium  [] Manual Therapy             Phosphate 40-80 mA min  [] Aquatic Therapy                                 [] Vaso Pneumatic compression  [] Other:  More objective information is available upon request.                      Physical Therapy Prescription:      [] Continue current Rx    [] Therapist Discretion    [] Rx as below  Recommended Changes to Treatment:  [] Heat/Cold  [] Stretching  [] Therapeutic Exercise  [] Reconditioning  [] Massage  [] Ultrasound  [] Electrical Stim  [] Iontophoresis: 40 mg/ml Dexamethasone Sodium Phosphate 40-80 mA min  [] Aquatics  [] Vasocompression  [] Other:  Frequency:          X/wk for          wks    Medicare/Regulatory Requirements:  I have reviewed this plan of care and certify a need for medically necessary rehabilitation services.   [] Physician Signature                                                   Date:       Thank you for your referral                    Electronically signed by: Manan Vizcarra PT DPT    [facility]) @ [address]  Phone [phone]  Fax [phone]    Therapy Time   Individual Concurrent Group Co-treatment   Time In 1700         Time Out 1800         Minutes 60           Treatment Charges: Minutes Units   []  Ultrasound     [x]  Electrical-Stim 15 1   []  Iontophoresis     []  Traction     []  Massage       []  Eval     []  Gait     [x]  Ther Exercise  15 1    []  Manual Therapy       []  Ther Activities       []  Aquatics     [x]  Vasopneumatic Device 15 1   []  Neuro Re-Ed       [x]  Other: Re-Evaluation 15  0    Total Treatment Time: 60 3      Vasopneumatic and E-stim performed concurrently     DYANA BradleyT

## 2020-11-02 ENCOUNTER — APPOINTMENT (OUTPATIENT)
Dept: PHYSICAL THERAPY | Age: 60
End: 2020-11-02
Payer: COMMERCIAL

## 2020-11-03 ENCOUNTER — HOSPITAL ENCOUNTER (OUTPATIENT)
Dept: PHYSICAL THERAPY | Age: 60
Setting detail: THERAPIES SERIES
Discharge: HOME | End: 2020-11-03
Payer: COMMERCIAL

## 2020-11-03 PROCEDURE — 97113 AQUATIC THERAPY/EXERCISES: CPT

## 2020-11-03 ASSESSMENT — PAIN DESCRIPTION - ORIENTATION: ORIENTATION: LEFT;ANTERIOR

## 2020-11-03 ASSESSMENT — PAIN DESCRIPTION - LOCATION: LOCATION: SHOULDER

## 2020-11-03 ASSESSMENT — PAIN SCALES - GENERAL: PAINLEVEL_OUTOF10: 6

## 2020-11-03 ASSESSMENT — PAIN DESCRIPTION - PAIN TYPE: TYPE: CHRONIC PAIN

## 2020-11-03 NOTE — PROGRESS NOTES
[address]   Outpatient Physical Therapy  [address] [facility]. [address]  Phone: [phone]  Fax: [phone]  Daily Progress Note    Date: 11/3/20    Patient Name: Christina Rolle        MRN: [id number]  Account: [de-identified] : 1960      General Information  Referring Practitioner: Matthias Navarrete  Referral Date : 20  Diagnosis: Left shoulder pain  Onset Date: 06/15/20  PT Insurance Information: Medical [employer]   Total # of Visits Approved: 12  Total # of Visits to Date: 6  No Show: 0  Canceled Appointment: 1    Subjective  Patient states she is always in pain with upper back, neck and L UE. Pain is constant in shoulder and upper arm; occasionally travels from elbow to forearm and hand with N/T in pinky finger     Pain  Patient Currently in Pain: Yes  Pain Assessment: 0-10  Pain Level: 6  Pain Type: Chronic pain  Pain Location: Shoulder  Pain Orientation: Left; Anterior    Objective  [facility]   Rehabilitation Services Exercise Log  Protocol Fibromyalgia    Date of Eval: 2020       Primary PT: Orlando Del Valle PT, DPT  Diagnosis: Fibromyalgia/(L) shoulder pain   Things to Focus On (goals): Pain Control, ROM   Surgical Precautions:  Medical Precautions: DM, hyperlipidemia, hypertension, restless leg syndrome, obesity  [] C-9 dates  [] Occ Med   [] Medicare     Name: Christina Rolle   Date 11/3/20       Visit # 6/12       Walk F/L/R 2 Laps w/ UE ROM       LE Exercise        Marching 10x       Squats 10x5\"       Step-Ups F/L        Heel-toe raises 10x       SLR F/L/R 10x       HS Curl        Lunges        Knee flex/ext        UE exercises        Retro Shrugs 10x       Horiz abd/add 10x       Fwd flex 10x       Abd/add 10x       PNF        Bicep Curls 10x       IR/ER 10x       Wall Push-Ups                Ball Shapes  Add              Kickboard Ex. Add      ROM w/ kickboard        Iso Abd.         Push-pull        Paddling        Breast-stroke        Rope pull UE Stretches        Corner stretch        Post. Capsule stretch        LE Stretches        Hamstring        Achilles        Quad                Pain Rating          Comment  Initiated aquatic therapy with emphasis on core & dorsal stability. Educated on postural awareness and benefits of aquatic therapy. Patient slightly fearful of water. Assessment: Body structures, Functions, Activity limitations: Decreased functional mobility ; Decreased ADL status; Decreased ROM; Decreased strength;Decreased endurance;Decreased balance;Decreased high-level IADLs  Treatment Diagnosis: (L) shoulder pain with mobility deficits  Prognosis: Fair   control/ROM for her (L) shoulder. Activity Tolerance  Patient limited by pain  Comments: Guarded with movement; cues for technique throughout treatment.     Plan  Continue with current plan(Assess response and progress as able)    Therapy Time:  Time In: 1325  Time Out: 1355  Minutes: 30  Timed Code Treatment Minutes: 25 Minutes    Treatment Charges: Minutes Units   []  Ultrasound     []  Electrical-Stim     []  Iontophoresis     []  Traction     []  Massage       []  Eval     []  Gait     []  Vasopneumatic Device     []  Ther Exercise       []  Manual Therapy       []  Ther Activities       [x]  Aquatics 25 2   []  Neuro Re-Ed       []  Other       Total Treatment Time: 25 2     Aura Mayen, PTA

## 2020-11-04 ENCOUNTER — APPOINTMENT (OUTPATIENT)
Dept: PHYSICAL THERAPY | Age: 60
End: 2020-11-04
Payer: COMMERCIAL

## 2020-11-05 ENCOUNTER — HOSPITAL ENCOUNTER (OUTPATIENT)
Dept: PHYSICAL THERAPY | Age: 60
Setting detail: THERAPIES SERIES
Discharge: HOME | End: 2020-11-05
Payer: COMMERCIAL

## 2020-11-05 PROCEDURE — 97113 AQUATIC THERAPY/EXERCISES: CPT

## 2020-11-05 ASSESSMENT — PAIN DESCRIPTION - PAIN TYPE: TYPE: CHRONIC PAIN

## 2020-11-05 ASSESSMENT — PAIN SCALES - GENERAL: PAINLEVEL_OUTOF10: 5

## 2020-11-05 ASSESSMENT — PAIN DESCRIPTION - ORIENTATION: ORIENTATION: LEFT;ANTERIOR

## 2020-11-05 ASSESSMENT — PAIN DESCRIPTION - LOCATION: LOCATION: SHOULDER

## 2020-11-05 NOTE — PROGRESS NOTES
[address] [facility]   Outpatient Physical Therapy  [address] [place] [address]. [address]  Phone: [phone]  Fax: [phone]  Daily Progress Note    Date: 20    Patient Name: Cookie Gonzalez        MRN: [id number]  Account: [de-identified] : 1960      General Information  Referring Practitioner: Billie Cortes  Referral Date : 20  Diagnosis: Left shoulder pain  Onset Date: 06/15/20  PT Insurance Information: Medical [employer]   Total # of Visits Approved: 12  Total # of Visits to Date: 7  No Show: 0  Canceled Appointment: 1    Subjective  No complaints after last visit- states she did not notice any increased soreness     Pain  Patient Currently in Pain: Yes  Pain Assessment: 0-10  Pain Level: 5  Pain Type: Chronic pain  Pain Location: Shoulder  Pain Orientation: Left; Anterior    Objective    [facility]   Rehabilitation Services Exercise Log  Protocol Fibromyalgia    Date of Eval: 2020       Primary PT: Damian Listen PT, DPT  Diagnosis: Fibromyalgia/(L) shoulder pain   Things to Focus On (goals): Pain Control, ROM   Surgical Precautions:  Medical Precautions: DM, hyperlipidemia, hypertension, restless leg syndrome, obesity  [] C-9 dates  [] Occ Med   [] Medicare     Name: Cookie oGnzalez   Date 11/3/20 11/5/20      Visit # 6/12       Walk F/L/R 2 Laps w/ UE ROM 2 Laps w/ UE ROM      LE Exercise        Marching 10x 10x      Squats 10x5\" 10x5\"      Step-Ups F/L        Heel-toe raises 10x 10x      SLR F/L/R 10x 10x      HS Curl        Lunges        Knee flex/ext        UE exercises        Retro Shrugs 10x 10x      Horiz abd/add 10x 10x      Fwd flex 10x 10x      Abd/add 10x 10x      PNF        Bicep Curls 10x 10x      IR/ER 10x 10x      Wall Push-Ups                Ball Shapes  Sm Ball  5x each   cw/ccw              Kickboard Ex. Small      ROM w/ kickboard  3x10\" 3-Ways      Iso Abd.         Push-pull        Paddling        Breast-stroke  2 Laps      Rope pull                UE Stretches        Corner stretch        Post. Capsule stretch        LE Stretches        Hamstring        Achilles        Quad                Pain Rating 6 5        Comment  Reviewed importance of postural awareness and core stability    Assessment  Body structures, Functions, Activity limitations: Decreased functional mobility ; Decreased ADL status; Decreased ROM; Decreased strength;Decreased endurance;Decreased balance;Decreased high-level IADLs  Treatment Diagnosis: (L) shoulder pain with mobility deficits  Prognosis: Fair  Treatment Initiated : Home exercise program - hot pack x 20 minutes to the (L) shoulder followed by AROM in all planes/motion 10 reps with each one. Vasopneumatic with E-stim x15 minutes - 34 degrees, low compression and shoulder sleeve. (semi-reclined position). Educated on the benefits of an aquatic program for pain control/ROM for her (L) shoulder. Activity Tolerance  Patient limited by pain  Comments: Remains guarded with L UE mobility- performed in deep water. Added ball shapes and kickboard stretches to tolerance.  Reviewed technique and dorsal stability    Plan  Continue with current plan(Progress kickboard exercise)    Therapy Time:  Time In: 1257  Time Out: 1332  Minutes: 35    Treatment Charges: Minutes Units   []  Ultrasound     []  Electrical-Stim     []  Iontophoresis     []  Traction     []  Massage       []  Eval     []  Gait     []  Vasopneumatic Device     []  Ther Exercise       []  Manual Therapy       []  Ther Activities       [x]  Aquatics 33 2   []  Neuro Re-Ed       []  Other       Total Treatment Time: 35 2     Fredy Palmer PTA

## 2020-11-09 ENCOUNTER — HOSPITAL ENCOUNTER (OUTPATIENT)
Dept: PHYSICAL THERAPY | Age: 60
Setting detail: THERAPIES SERIES
Discharge: HOME | End: 2020-11-09
Payer: COMMERCIAL

## 2020-11-09 ENCOUNTER — APPOINTMENT (OUTPATIENT)
Dept: PHYSICAL THERAPY | Age: 60
End: 2020-11-09
Payer: COMMERCIAL

## 2020-11-09 PROCEDURE — 97113 AQUATIC THERAPY/EXERCISES: CPT

## 2020-11-09 ASSESSMENT — PAIN DESCRIPTION - LOCATION: LOCATION: SHOULDER

## 2020-11-09 ASSESSMENT — PAIN DESCRIPTION - ORIENTATION: ORIENTATION: LEFT;ANTERIOR

## 2020-11-09 ASSESSMENT — PAIN DESCRIPTION - PAIN TYPE: TYPE: CHRONIC PAIN

## 2020-11-09 ASSESSMENT — PAIN SCALES - GENERAL: PAINLEVEL_OUTOF10: 5

## 2020-11-09 NOTE — PROGRESS NOTES
[address]   Outpatient Physical Therapy  [address] [facility]. [address]  Phone: [phone]  Fax: [phone]  Daily Progress Note    Date: 20    Patient Name: Kelly Bradford        MRN: [id number]  Account: [de-identified] : 1960      General Information  Referring Practitioner: Domi Loera  Referral Date : 20  Diagnosis: Left shoulder pain  Onset Date: 06/15/20  PT Insurance Information: Medical [place]   Total # of Visits Approved: 12  Total # of Visits to Date: 8  No Show: 0  Canceled Appointment: 1    Subjective  Mild soreness after last visit in L shoulder but states it did not last long. Has noticed more mobility in L Shoulder and less pain in hips since beginning pool   Pain  Patient Currently in Pain: Yes  Pain Assessment: 0-10  Pain Level: 5  Pain Type: Chronic pain  Pain Location: Shoulder  Pain Orientation: Left; Anterior    Objective  [facility]   Rehabilitation Services Exercise Log  Protocol Fibromyalgia    Date of Eval: 2020       Primary PT: Stefan Dang PT, DPT  Diagnosis: Fibromyalgia/(L) shoulder pain   Things to Focus On (goals): Pain Control, ROM   Surgical Precautions:  Medical Precautions: DM, hyperlipidemia, hypertension, restless leg syndrome, obesity  [] C-9 dates  [] Occ Med   [] Medicare     Name: Kelly Held   Date 11/3/20 11/5/20 11/9/20     Visit #      Walk F/L/R 2 Laps w/ UE ROM 2 Laps w/ UE ROM 2 Laps w/ UE ROM      LE Exercise        Marching 10x 10x 12x     Squats 10x5\" 10x5\" 12x5\"     Step-Ups F/L    Add    Heel-toe raises 10x 10x 12x     SLR F/L/R 10x 10x 12x     HS Curl    Add    Lunges        Knee flex/ext   10x     UE exercises        Retro Shrugs 10x 10x 10x     Horiz abd/add 10x 10x 10x     Fwd flex 10x 10x 10x     Abd/add 10x 10x 10x     PNF        Bicep Curls 10x 10x 10x     IR/ER 10x 10x 10x     Wall Push-Ups                Ball Shapes  Sm Ball  5x each   cw/ccw Sm Ball  5x each  cw/ccw Kickboard Ex. Small Small     ROM w/ kickboard  3x10\" 3-Ways 3x10\"  3-Ways     Iso Abd. Add    Push-pull        Paddling        Breast-stroke  2 Laps 2 Laps     Rope pull                UE Stretches        Corner stretch        Post. Capsule stretch        LE Stretches        Hamstring        Achilles        Quad                Pain Rating 6 5 5         Comment  Reviewed importance of postural awareness and core stability    Assessment: Body structures, Functions, Activity limitations: Decreased functional mobility ; Decreased ADL status; Decreased ROM; Decreased strength;Decreased endurance;Decreased balance;Decreased high-level IADLs  Treatment Diagnosis: (L) shoulder pain with mobility deficits  Prognosis: Fair  REQUIRES PT FOLLOW UP: Yes    Activity Tolerance  Patient limited by pain  Comments: Guarded with mobility especially on L UE; requires extra time. Increased reps with LE exercise and added hip/knee flex/ext without issue.  Emphasis on technique throughout program.    Plan  Continue with current plan(Progress kickboard exercise; add step ups)    Therapy Time:  Time In: 1629  Time Out: 1658  Minutes: 29    Treatment Charges: Minutes Units   []  Ultrasound     []  Electrical-Stim     []  Iontophoresis     []  Traction     []  Massage       []  Eval     []  Gait     []  Vasopneumatic Device     []  Ther Exercise       []  Manual Therapy       []  Ther Activities       [x]  Aquatics 29 2   []  Neuro Re-Ed       []  Other       Total Treatment Time: 34 2       Tamika Salazar PTA

## 2020-11-11 ENCOUNTER — HOSPITAL ENCOUNTER (OUTPATIENT)
Dept: PHYSICAL THERAPY | Age: 60
Setting detail: THERAPIES SERIES
Discharge: HOME | End: 2020-11-11
Payer: COMMERCIAL

## 2020-11-11 ENCOUNTER — APPOINTMENT (OUTPATIENT)
Dept: PHYSICAL THERAPY | Age: 60
End: 2020-11-11
Payer: COMMERCIAL

## 2020-11-11 NOTE — PROGRESS NOTES
[address]   Outpatient Physical Therapy  Cancel/No Show Note    Date: 20    Patient Name: Alexander Darling        MRN: [id number]  Account: [de-identified] : 1960      General Information:  Referring Practitioner: Rashmi Fontaine  Referral Date : 20  Diagnosis: Left shoulder pain  Onset Date: 06/15/20  PT Insurance Information: Medical [place]   Total # of Visits Approved: 12  Total # of Visits to Date: 8  No Show: 0  Canceled Appointment: 2    Comments: PATIENT CANCELED AQUATICS AND RE-EVAL- PER  NOT FEELING WELL.      Jacek Gonzales, PTA

## 2020-11-12 ENCOUNTER — OFFICE VISIT (OUTPATIENT)
Dept: FAMILY MEDICINE CLINIC | Age: 60
End: 2020-11-12
Payer: COMMERCIAL

## 2020-11-12 VITALS
HEIGHT: 63 IN | WEIGHT: 201.4 LBS | SYSTOLIC BLOOD PRESSURE: 128 MMHG | DIASTOLIC BLOOD PRESSURE: 80 MMHG | TEMPERATURE: 97.5 F | BODY MASS INDEX: 35.68 KG/M2

## 2020-11-12 PROCEDURE — 99214 OFFICE O/P EST MOD 30 MIN: CPT | Performed by: STUDENT IN AN ORGANIZED HEALTH CARE EDUCATION/TRAINING PROGRAM

## 2020-11-12 PROCEDURE — 3051F HG A1C>EQUAL 7.0%<8.0%: CPT | Performed by: STUDENT IN AN ORGANIZED HEALTH CARE EDUCATION/TRAINING PROGRAM

## 2020-11-12 RX ORDER — BLOOD SUGAR DIAGNOSTIC
STRIP MISCELLANEOUS
Qty: 100 EACH | Refills: 3 | Status: SHIPPED | OUTPATIENT
Start: 2020-11-12 | End: 2022-07-12 | Stop reason: SDUPTHER

## 2020-11-12 RX ORDER — DULAGLUTIDE 0.75 MG/.5ML
INJECTION, SOLUTION SUBCUTANEOUS
Qty: 2 ML | Refills: 2 | Status: CANCELLED | OUTPATIENT
Start: 2020-11-12

## 2020-11-12 RX ORDER — DULAGLUTIDE 1.5 MG/.5ML
1.5 INJECTION, SOLUTION SUBCUTANEOUS WEEKLY
Qty: 2 ML | Refills: 1 | Status: SHIPPED | OUTPATIENT
Start: 2020-11-12 | End: 2020-12-28

## 2020-11-12 RX ORDER — CYCLOBENZAPRINE HCL 10 MG
10 TABLET ORAL NIGHTLY PRN
Qty: 30 TABLET | Refills: 0 | Status: SHIPPED | OUTPATIENT
Start: 2020-11-12 | End: 2020-12-12

## 2020-11-12 RX ORDER — ROSUVASTATIN CALCIUM 20 MG/1
20 TABLET, COATED ORAL NIGHTLY
Qty: 30 TABLET | Refills: 3 | Status: SHIPPED | OUTPATIENT
Start: 2020-11-12 | End: 2021-03-22

## 2020-11-12 RX ORDER — BLOOD-GLUCOSE CONTROL, LOW
EACH MISCELLANEOUS
Qty: 100 EACH | Refills: 3 | Status: SHIPPED | OUTPATIENT
Start: 2020-11-12 | End: 2021-06-16

## 2020-11-12 NOTE — PATIENT INSTRUCTIONS
Thank you for letting us take care of you today. We hope all your questions were addressed. If a question was overlooked or something else comes to mind after you return home, please contact a member of your Care Team listed below. Your Care Team at [place] [address] is Team #1  Blanca Ratliff MD (Faculty)  Tori Salas (Resident)  Lilo Bradford MD (Resident)  SHERRY Jasmine LPN [facility] office)  Venecia Medina, [facility] (Clinical Practice Manager)  [facility] (Clinical Pharmacist)     Office phone number: [phone]    If you need to get in right away due to illness, please be advised we have \"Same Day\" appointments available Monday-Friday. Please call us at [phone] option #3 to schedule your \"Same Day\" appointment.

## 2020-11-12 NOTE — PROGRESS NOTES
Visit Information    Have you changed or started any medications since your last visit including any over-the-counter medicines, vitamins, or herbal medicines? no   Have you stopped taking any of your medications? Is so, why? -  no  Are you having any side effects from any of your medications? - no    Have you seen any other physician or provider since your last visit? yes - PT   Have you had any other diagnostic tests since your last visit? yes - XR   Have you been seen in the emergency room and/or had an admission in a hospital since we last saw you?  no   Have you had your routine dental cleaning in the past 6 months?  no     Do you have an active MyChart account? If no, what is the barrier?   Yes    Patient Care Team:  Gordo Smith MD as PCP - General (Family Medicine)  Gordo Smith MD as PCP - [facility]  Hal Romberg, MD as Consulting Physician (Gastroenterology)    Medical History Review  Past Medical, Family, and Social History reviewed and does not contribute to the patient presenting condition    Health Maintenance   Topic Date Due    Hepatitis B vaccine (1 of 3 - Risk 3-dose series) 03/10/1980    Shingles Vaccine (1 of 2) 03/10/2011    Diabetic retinal exam  01/12/2017    Diabetic foot exam  12/01/2017    Creatinine monitoring  02/18/2020    Breast cancer screen  02/26/2020    Diabetic microalbuminuria test  12/13/2020    DTaP/Tdap/Td vaccine (2 - Tdap) 12/15/2020    A1C test (Diabetic or Prediabetic)  08/04/2021    Lipid screen  08/04/2021    Potassium monitoring  08/04/2021    Colon cancer screen colonoscopy  09/20/2022    Flu vaccine  Completed    Pneumococcal 0-64 years Vaccine  Completed    Hepatitis C screen  Completed    HIV screen  Completed    Hepatitis A vaccine  Aged Out    Hib vaccine  Aged Out    Meningococcal (ACWY) vaccine  Aged Out

## 2020-11-12 NOTE — PROGRESS NOTES
[facility] MEDICINE RESIDENCY PROGRAM    Subjective:    Vaughn Espinoza is a 62 y.o. female with  has a past medical history of Cervical spondylosis, Hyperlipidemia, Hypertension, DIEGO (nonalcoholic steatohepatitis), Obesity, Osteoarthritis of left knee, Restless legs syndrome, and Type II or unspecified type diabetes mellitus without mention of complication, not stated as uncontrolled. No family history on file. Presented to the office today for:  Chief Complaint   Patient presents with    Arm Pain     Left arm / shoulder pain - No injury reported    Medication Refill     Home delivery - asking about Voltarin Gel       HPI    T2DM  Patient Reports Diabetes is Improving/ Controlled  Meds: Trulicity 1.5 weekly, Glipizide 10 OD, and Synjardy 5-1000 OD  Compliance: Reports good  Home blood sugar records: fasting range: <200  Any episodes of hypoglycemia: No  Fluctuating blood sugars: No  Weight trend: stable 201 lbs today, was 197 Sept 2019  Current diet: Varied  Current exercise: As tolerated, in PT currently  Known diabetic complications: NA  Urine microalb: <12 on 12/13/2019  Hba1c 7.5 on 8/4/2020 was 7.4 9/26/2019    Sees Dr. Jailyn Beyer for foot exam     HTN  Controlled in office  Is having dry cough that she has had for some time.  Is tolerable but could be related to ACEi use  No other concerns  Needs creat monitoring     Dyslipidemia   Stable no complaints   Lipid panel 8/4/2020 has elevated cholesterol at 220 and Tri at 323 but WNL HDL and LDL  Needs refill on medications     Right Arm Pain/ shoulder pain   Not improving   Adhesive capsulitis vs fibromyalgia or a combination there of  Is in PT, not feeling much benefit   Stopped Cymbalta due to side effects   Topical treatments provide some relief as does Tylenol   Unable to life or carry objects  Is distressing  Denies Trauma  Pain is over entire shoulder but worse over anterior and lateral aspect  Described as feeling like a some sawed part of her arm bone (points to humerus at the proximal end) then is tugging on it. Can be sharp  Also still describes generalized pain that is everywhere that comes and goes, in legs left arm and leg/ hips/ shoulders     Review of Systems   Constitutional: Negative for activity change, appetite change, chills, diaphoresis, fatigue, fever and unexpected weight change. HENT: Negative for congestion, rhinorrhea, sneezing, sore throat, trouble swallowing and voice change. Eyes: Negative for pain and visual disturbance. Respiratory: Negative for cough. Negative for shortness of breath, wheezing and stridor. Cardiovascular: Negative for chest pain, palpitations and leg swelling. Gastrointestinal: Negative for abdominal pain, constipation, diarrhea, nausea and vomiting. Genitourinary: Negative for difficulty urinating and dysuria. Musculoskeletal: Positive for arthralgias, gait problem, neck pain and neck stiffness. Neurological: Negative for dizziness, seizures, syncope, weakness and headaches. Objective:    /80 (Site: Left Lower Arm, Position: Sitting, Cuff Size: Medium Adult) Comment: manual  Temp 97.5 °F (36.4 °C) (Temporal)   Ht 5' 2.99\" (1.6 m)   Wt 201 lb 6.4 oz (91.4 kg)   BMI 35.69 kg/m²    BP Readings from Last 3 Encounters:   11/12/20 128/80   09/29/20 103/65   08/19/20 100/62     Physical Exam  Constitutional:       General: He is not in acute distress. Appearance: Normal appearance. HENT:      Head: Normocephalic and atraumatic. Cardiovascular:      Rate and Rhythm: Normal rate and regular rhythm. No murmur   Pulmonary:      Effort: Pulmonary effort is normal. No respiratory distress. Breath sounds: Normal breath sounds. No wheezing. Abdominal:      General: Abdomen is flat. Bowel sounds are normal. There is no distension. Palpations: Abdomen is soft. Tenderness: There is no tenderness.    Musculoskeletal:      Right shoulder: Tenderness to palpation. No erythema or swelling. AROM and Passive limited by pain strength decreased to 4 or 3 out of 5. Sensation and pulses intact and symmetrical.       Right lower leg: No edema. Left lower leg: No edema. Skin:     General: Skin is warm and dry. Neurological:      General: No focal deficit present. Mental Status: He is alert and oriented to person, place, and time. Psychiatric:         Mood and Affect: Mood normal.      Lab Results   Component Value Date    WBC 9.9 08/05/2020    HGB 12.0 08/05/2020    HCT 39.0 08/05/2020     08/05/2020    CHOL 220 (H) 08/04/2020    TRIG 232 (H) 08/04/2020    HDL 60 08/04/2020    LDLDIRECT 156 (H) 05/20/2017    ALT 30 08/04/2020    AST 21 08/04/2020     08/04/2020    K 4.8 08/04/2020     08/04/2020    CREATININE 1.13 (H) 08/04/2020    BUN 26 (H) 08/04/2020    CO2 23 08/04/2020    TSH 2.25 08/04/2020    INR 1.0 07/23/2013    GLUF 262 (H) 09/10/2018    LABA1C 7.5 08/04/2020    LABMICR CANNOT BE CALCULATED 12/13/2019     Lab Results   Component Value Date    CALCIUM 9.4 08/04/2020     Lab Results   Component Value Date    LDLCHOLESTEROL 114 08/04/2020    LDLDIRECT 156 (H) 05/20/2017       Assessment:     1. Type 2 diabetes mellitus without complication, without long-term current use of insulin ([address]    2. Dyslipidemia    3. Essential hypertension    4. Adhesive capsulitis of left shoulder    5. Fibromyalgia        Plan:   1. Type 2 diabetes mellitus without complication, without long-term current use of insulin ([facility])  - Stable   - discussed diet and lifestyle modifications   - Prodigy Lancets 28G MISC; Use to test once daily and as needed as directed by provider  Dispense: 100 each; Refill: 3  - blood glucose test strips (PRODIGY NO CODING BLOOD GLUC) strip; Use to test once daily and as needed as directed by provider  Dispense: 100 each;  Refill: 3  - Dulaglutide (TRULICITY) 1.5 AK/3.2MZ SOPN; Inject 1.5 mg into the skin once a week  Dispense: 2 strips (PRODIGY NO CODING BLOOD GLUC) strip REORDER       Return in about 3 months (around 2/12/2021) for F/U .

## 2020-11-14 ENCOUNTER — HOSPITAL ENCOUNTER (OUTPATIENT)
Dept: WOMENS IMAGING | Age: 60
Discharge: HOME | End: 2020-11-16
Payer: COMMERCIAL

## 2020-11-14 PROCEDURE — 77063 BREAST TOMOSYNTHESIS BI: CPT

## 2020-11-25 NOTE — TELEPHONE ENCOUNTER
Last visit: 11/12/20  Last Med refill:   Does patient have enough medication for 72 hours:     Next Visit Date:  Future Appointments   Date Time Provider Nicola Zaidi   12/7/2020  4:00 PM Caryn Henry, [facility]   12/7/2020  4:30 PM Jacob Rosa, [place] [facility] [place]   12/8/2020 10:00 AM Charlene Celeste MD [address] [place] [address] Maintenance   Topic Date Due    Diabetic retinal exam  01/12/2017    Creatinine monitoring  02/18/2020    Hepatitis B vaccine (1 of 3 - Risk 3-dose series) 03/01/2021 (Originally 3/10/1980)    Shingles Vaccine (1 of 2) 03/01/2021 (Originally 3/10/2011)    Diabetic foot exam  11/12/2021 (Originally 12/1/2017)    Diabetic microalbuminuria test  12/13/2020    DTaP/Tdap/Td vaccine (2 - Tdap) 12/15/2020    A1C test (Diabetic or Prediabetic)  08/04/2021    Lipid screen  08/04/2021    Potassium monitoring  08/04/2021    Colon cancer screen colonoscopy  09/20/2022    Breast cancer screen  11/14/2022    Flu vaccine  Completed    Hepatitis C screen  Completed    HIV screen  Completed    Hepatitis A vaccine  Aged Out    Hib vaccine  Aged Out    Meningococcal (ACWY) vaccine  Aged Out    Pneumococcal 0-64 years Vaccine  Aged Out       Hemoglobin A1C (%)   Date Value   08/04/2020 7.5   09/26/2019 7.4   06/24/2019 8.4 (H)             ( goal A1C is < 7)   Microalb/Crt.  Ratio (mcg/mg creat)   Date Value   12/13/2019 CANNOT BE CALCULATED     LDL Cholesterol (mg/dL)   Date Value   08/04/2020 114   06/24/2019 42       (goal LDL is <100)   AST (U/L)   Date Value   08/04/2020 21     ALT (U/L)   Date Value   08/04/2020 30     BUN (mg/dL)   Date Value   08/04/2020 26 (H)     BP Readings from Last 3 Encounters:   11/12/20 128/80   09/29/20 103/65   08/19/20 100/62          (goal 120/80)    All Future Testing planned in [facility]  Lab Frequency Next Occurrence   ALT Once 65/11/6189   Basic Metabolic Panel Once 70/19/3639               Patient Active Problem List:     Cervical spondylosis     Type II or unspecified type diabetes mellitus without mention of complication, not stated as uncontrolled     Hypertension     Abnormal auditory perception     Eustachian tube dysfunction     Chronic serous otitis media     Nasal polyps     Nasal congestion     Osteoarthritis of left knee     Osteoarthritis of right knee     DIEGO (nonalcoholic steatohepatitis)     Vitamin D deficiency     Iron deficiency anemia     Dyslipidemia     Diabetes mellitus type 2, uncontrolled (HCC)     Left hip pain     Trochanteric bursitis     Fatigue     Daytime somnolence     Snoring

## 2020-11-27 RX ORDER — BUSPIRONE HYDROCHLORIDE 10 MG/1
TABLET ORAL
Qty: 180 TABLET | Refills: 0 | Status: SHIPPED | OUTPATIENT
Start: 2020-11-27 | End: 2020-12-08 | Stop reason: SDUPTHER

## 2020-12-02 RX ORDER — GLIPIZIDE 10 MG/1
TABLET ORAL
Qty: 180 TABLET | Refills: 1 | Status: SHIPPED | OUTPATIENT
Start: 2020-12-02 | End: 2021-06-09

## 2020-12-02 NOTE — TELEPHONE ENCOUNTER
Last visit:   Last Med refill:   Does patient have enough medication for 72 hours: No:     Next Visit Date:  Future Appointments   Date Time Provider Nicola Zaidi   12/7/2020  4:00 PM Laina Barrera, [address]   12/7/2020  4:30 PM Garcia Araujo, PTA [facility] [address] [place] [address]   12/8/2020 10:00 AM Mike Appiah MD [address] [place] [address] Maintenance   Topic Date Due    Diabetic retinal exam  01/12/2017    Creatinine monitoring  02/18/2020    Diabetic microalbuminuria test  12/13/2020    Hepatitis B vaccine (1 of 3 - Risk 3-dose series) 03/01/2021 (Originally 3/10/1980)    Shingles Vaccine (1 of 2) 03/01/2021 (Originally 3/10/2011)    Diabetic foot exam  11/12/2021 (Originally 12/1/2017)    DTaP/Tdap/Td vaccine (2 - Tdap) 12/15/2020    A1C test (Diabetic or Prediabetic)  08/04/2021    Lipid screen  08/04/2021    Potassium monitoring  08/04/2021    Colon cancer screen colonoscopy  09/20/2022    Breast cancer screen  11/14/2022    Flu vaccine  Completed    Hepatitis C screen  Completed    HIV screen  Completed    Hepatitis A vaccine  Aged Out    Hib vaccine  Aged Out    Meningococcal (ACWY) vaccine  Aged Out    Pneumococcal 0-64 years Vaccine  Aged Out       Hemoglobin A1C (%)   Date Value   08/04/2020 7.5   09/26/2019 7.4   06/24/2019 8.4 (H)             ( goal A1C is < 7)   Microalb/Crt.  Ratio (mcg/mg creat)   Date Value   12/13/2019 CANNOT BE CALCULATED     LDL Cholesterol (mg/dL)   Date Value   08/04/2020 114   06/24/2019 42       (goal LDL is <100)   AST (U/L)   Date Value   08/04/2020 21     ALT (U/L)   Date Value   08/04/2020 30     BUN (mg/dL)   Date Value   08/04/2020 26 (H)     BP Readings from Last 3 Encounters:   11/12/20 128/80   09/29/20 103/65   08/19/20 100/62          (goal 120/80)    All Future Testing planned in [facility]  Lab Frequency Next Occurrence   ALT Once 68/18/6345   Basic Metabolic Panel Once 68/70/9905               Patient Active Problem List: Cervical spondylosis     Type II or unspecified type diabetes mellitus without mention of complication, not stated as uncontrolled     Hypertension     Abnormal auditory perception     Eustachian tube dysfunction     Chronic serous otitis media     Nasal polyps     Nasal congestion     Osteoarthritis of left knee     Osteoarthritis of right knee     DIEGO (nonalcoholic steatohepatitis)     Vitamin D deficiency     Iron deficiency anemia     Dyslipidemia     Diabetes mellitus type 2, uncontrolled (HCC)     Left hip pain     Trochanteric bursitis     Fatigue     Daytime somnolence     Snoring           Please address the medication refill and close the encounter. If I can be of assistance, please route to the applicable pool. Thank you.

## 2020-12-03 ENCOUNTER — HOSPITAL ENCOUNTER (OUTPATIENT)
Age: 60
Setting detail: SPECIMEN
Discharge: HOME | End: 2020-12-03
Payer: COMMERCIAL

## 2020-12-03 LAB
ALT SERPL-CCNC: 28 U/L (ref 5–33)
ANION GAP SERPL CALCULATED.3IONS-SCNC: 15 MMOL/L (ref 9–17)
BUN BLDV-MCNC: 22 MG/DL (ref 6–20)
BUN/CREAT BLD: ABNORMAL (ref 9–20)
CALCIUM SERPL-MCNC: 9 MG/DL (ref 8.6–10.4)
CHLORIDE BLD-SCNC: 102 MMOL/L (ref 98–107)
CO2: 25 MMOL/L (ref 20–31)
CREAT SERPL-MCNC: 1.21 MG/DL (ref 0.5–0.9)
GFR AFRICAN AMERICAN: 55 ML/MIN
GFR NON-AFRICAN AMERICAN: 46 ML/MIN
GFR SERPL CREATININE-BSD FRML MDRD: ABNORMAL ML/MIN/{1.73_M2}
GFR SERPL CREATININE-BSD FRML MDRD: ABNORMAL ML/MIN/{1.73_M2}
GLUCOSE BLD-MCNC: 152 MG/DL (ref 70–99)
POTASSIUM SERPL-SCNC: 4.5 MMOL/L (ref 3.7–5.3)
SODIUM BLD-SCNC: 142 MMOL/L (ref 135–144)

## 2020-12-07 ENCOUNTER — HOSPITAL ENCOUNTER (OUTPATIENT)
Dept: PHYSICAL THERAPY | Age: 60
Setting detail: THERAPIES SERIES
Discharge: HOME | End: 2020-12-07
Payer: COMMERCIAL

## 2020-12-07 PROCEDURE — 97113 AQUATIC THERAPY/EXERCISES: CPT

## 2020-12-07 PROCEDURE — 97110 THERAPEUTIC EXERCISES: CPT

## 2020-12-07 ASSESSMENT — PAIN SCALES - GENERAL
PAINLEVEL_OUTOF10: 8
PAINLEVEL_OUTOF10: 8

## 2020-12-07 ASSESSMENT — PAIN DESCRIPTION - ORIENTATION
ORIENTATION: LEFT;ANTERIOR
ORIENTATION: LEFT;ANTERIOR

## 2020-12-07 ASSESSMENT — PAIN DESCRIPTION - PAIN TYPE
TYPE: CHRONIC PAIN
TYPE: CHRONIC PAIN

## 2020-12-07 ASSESSMENT — PAIN DESCRIPTION - LOCATION
LOCATION: SHOULDER
LOCATION: SHOULDER

## 2020-12-07 ASSESSMENT — PAIN DESCRIPTION - PROGRESSION: CLINICAL_PROGRESSION: NOT CHANGED

## 2020-12-07 NOTE — PROGRESS NOTES
Physical Therapy  Progress note  Date: 2020  Patient Name: Kaela Guillen  MRN: [id number]  : 1960     Treatment Diagnosis: (L) shoulder pain with mobility deficits    Subjective   General  Referring Practitioner: Jerica Luu  Referral Date : 20  Diagnosis: Left shoulder pain  PT Visit Information  Onset Date: 06/15/20  PT Insurance Information: Medical [place]   Total # of Visits Approved: 12  Total # of Visits to Date: 9  No Show: 0  Canceled Appointment: 3  Subjective  Subjective: I had some L anterior chest pain today. The arm isn't much better. Pain Screening  Patient Currently in Pain: Yes  Pain Assessment  Pain Assessment: 0-10  Pain Level: 8  Patient's Stated Pain Goal: No pain  Pain Type: Chronic pain  Pain Location: Shoulder  Pain Orientation: Left; Anterior  Clinical Progression: Not changed  Vital Signs  Patient Currently in Pain: Yes      Objective     Observation/Palpation  Palpation: tenderness inferior to L clavicle     Additional Measures  Special Tests: speeds and open can both + L        Assessment   Conditions Requiring Skilled Therapeutic Intervention  Assessment: Pt continues with L shoulder pain. Pt's therapy interrupted due to illness. She may improve with more consistent therapy for the last 3 visits. Treatment Diagnosis: (L) shoulder pain with mobility deficits  Activity Tolerance  Activity Tolerance: Patient limited by pain         Plan Continue for last 3 aquatic visits then d/c PT. Pt may benefit from further testing to r/o more serious pathology. Goals  Long term goals  Time Frame for Long term goals : 12 visits(Increased at eval (2x6))  Long term goal 1: OPTIMAL score of 3/3  at the time of her discharge.  (Not Met)       Therapy Time   Individual Concurrent Group Co-treatment   Time In 1602         Time Out 1614         Minutes 12             There ex x 1    Cherrie Enriquez, PT

## 2020-12-07 NOTE — PROGRESS NOTES
[address]   Outpatient Physical Therapy  [address] [facility]. [address]  Phone: [phone]  Fax: [phone]  Daily Progress Note    Date: 20    Patient Name: Ryan Almaguer        MRN: [id number]  Account: [de-identified] : 1960      General Information:  Referring Practitioner: Jeffrey Terrazas  Referral Date : 20  Diagnosis: Left shoulder pain  Onset Date: 06/15/20  PT Insurance Information: Medical [place]   Total # of Visits Approved: 12  Total # of Visits to Date: 9  No Show: 0  Canceled Appointment: 3    Subjective:  Subjective: Reports shoulder moves better while in the water but does not notice much carry over outside of pool. per patient  wants an MRI of shoulder. Pain:  Patient Currently in Pain: Yes  Pain Assessment: 0-10  Pain Level: 8  Pain Type: Chronic pain  Pain Location: Shoulder  Pain Orientation: Left; Anterior       Objective:  [facility] Exercise Log  Protocol Fibromyalgia    Date of Eval: 2020       Primary PT: Laina Barrera PT, DPT  Diagnosis: Fibromyalgia/(L) shoulder pain   Things to Focus On (goals): Pain Control, ROM   Surgical Precautions:  Medical Precautions: DM, hyperlipidemia, hypertension, restless leg syndrome, obesity  [] C-9 dates  [] Occ Med   [] Medicare     Name: Ryan Almaguer   Date 11/3/20 11/5/20 11/9/20 12/7/20    Visit #     Walk F/L/R 2 Laps w/ UE ROM 2 Laps w/ UE ROM 2 Laps w/ UE ROM  2 Laps w/ UE ROM    LE Exercise        Marching 10x 10x 12x 12x    Squats 10x5\" 10x5\" 12x5\" 12x5\"    Step-Ups F/L     Add   Heel-toe raises 10x 10x 12x 12x    SLR F/L/R 10x 10x 12x 12x    HS Curl     Add   Lunges        Knee flex/ext   10x 12x    UE exercises        Retro Shrugs 10x 10x 10x 10x    Horiz abd/add 10x 10x 10x 10x    Fwd flex 10x 10x 10x 10x    Abd/add 10x 10x 10x 10x    PNF        Bicep Curls 10x 10x 10x 10x    IR/ER 10x 10x 10x 10x    Wall Push-Ups                Nissa Deter Shapes  Sm Ball  5x each   cw/ccw Sm Ball  5x each  cw/ccw Small Ball  5x each  cw/ccw            Kickboard Ex. Small Small Small    ROM w/ kickboard  3x10\" 3-Ways 3x10\"  3-Ways 3x10\"  3-Ways    Push Down    5x    Push-pull        Paddling        Breast-stroke  2 Laps 2 Laps 2 Laps    Rope pull                UE Stretches        Corner stretch        Post. Capsule stretch        LE Stretches        Hamstring        Achilles        Quad                Pain Rating 6 5 5 8          Comment:  Comments: Reviewed importance of postural awareness and core stability    Assessment: Body structures, Functions, Activity limitations: Decreased functional mobility ; Decreased ADL status; Decreased ROM; Decreased strength;Decreased endurance;Decreased balance;Decreased high-level IADLs  Treatment Diagnosis: (L) shoulder pain with mobility deficits  Prognosis: Fair  REQUIRES PT FOLLOW UP: Yes  Activity Tolerance: Patient limited by pain  Comments: Slow paced- requires extra time. Cues needed throughout program for technique. resumed program to tolerance this date    Plan:  Plan: Continue with current plan(to finish 3 more visits and DC)    Therapy Time:  Time In: 1625  Time Out: 1708  Minutes: 43    Physical therapy treatment completed today in part by physical therapist assistant (PTA).  43 min and RE EVAL by PT 12 minutes- documented on separate note for 1 ther ex    Treatment Charges: Minutes Units   []  Ultrasound     []  Electrical-Stim     []  Iontophoresis     []  Traction     []  Massage       []  Eval     []  Gait     []  Vasopneumatic Device     []  Ther Exercise       []  Manual Therapy       []  Ther Activities       [x]  Aquatics 43 3   []  Neuro Re-Ed       []  Other       Total Treatment Time: 37 [address], PTA   Electronically signed by Kathy Rae PT on 12/10/2020 at 9:12 AM

## 2020-12-07 NOTE — TELEPHONE ENCOUNTER
Active Problem List:     Cervical spondylosis     Type II or unspecified type diabetes mellitus without mention of complication, not stated as uncontrolled     Hypertension     Abnormal auditory perception     Eustachian tube dysfunction     Chronic serous otitis media     Nasal polyps     Nasal congestion     Osteoarthritis of left knee     Osteoarthritis of right knee     DIEGO (nonalcoholic steatohepatitis)     Vitamin D deficiency     Iron deficiency anemia     Dyslipidemia     Diabetes mellitus type 2, uncontrolled (HCC)     Left hip pain     Trochanteric bursitis     Fatigue     Daytime somnolence     Snoring

## 2020-12-08 ENCOUNTER — OFFICE VISIT (OUTPATIENT)
Dept: FAMILY MEDICINE CLINIC | Age: 60
End: 2020-12-08
Payer: COMMERCIAL

## 2020-12-08 VITALS
DIASTOLIC BLOOD PRESSURE: 79 MMHG | HEART RATE: 62 BPM | HEIGHT: 63 IN | OXYGEN SATURATION: 96 % | SYSTOLIC BLOOD PRESSURE: 116 MMHG | TEMPERATURE: 96.7 F | WEIGHT: 199 LBS | BODY MASS INDEX: 35.26 KG/M2

## 2020-12-08 PROBLEM — G25.81 RESTLESS LEGS SYNDROME: Status: ACTIVE | Noted: 2020-12-08

## 2020-12-08 PROBLEM — F41.1 GENERALIZED ANXIETY DISORDER: Status: ACTIVE | Noted: 2020-12-08

## 2020-12-08 PROBLEM — M67.912 TENDINOPATHY OF LEFT SHOULDER: Status: ACTIVE | Noted: 2020-12-08

## 2020-12-08 PROBLEM — G89.29 CHRONIC LEFT SHOULDER PAIN: Status: ACTIVE | Noted: 2020-12-08

## 2020-12-08 PROBLEM — M25.512 CHRONIC LEFT SHOULDER PAIN: Status: ACTIVE | Noted: 2020-12-08

## 2020-12-08 PROBLEM — M19.012 PRIMARY OSTEOARTHRITIS, LEFT SHOULDER: Status: ACTIVE | Noted: 2020-12-08

## 2020-12-08 LAB — HBA1C MFR BLD: 7.9 %

## 2020-12-08 PROCEDURE — 99211 OFF/OP EST MAY X REQ PHY/QHP: CPT | Performed by: FAMILY MEDICINE

## 2020-12-08 PROCEDURE — 83036 HEMOGLOBIN GLYCOSYLATED A1C: CPT | Performed by: STUDENT IN AN ORGANIZED HEALTH CARE EDUCATION/TRAINING PROGRAM

## 2020-12-08 PROCEDURE — 99213 OFFICE O/P EST LOW 20 MIN: CPT | Performed by: STUDENT IN AN ORGANIZED HEALTH CARE EDUCATION/TRAINING PROGRAM

## 2020-12-08 PROCEDURE — 3051F HG A1C>EQUAL 7.0%<8.0%: CPT | Performed by: STUDENT IN AN ORGANIZED HEALTH CARE EDUCATION/TRAINING PROGRAM

## 2020-12-08 RX ORDER — BUSPIRONE HYDROCHLORIDE 10 MG/1
TABLET ORAL
Qty: 180 TABLET | Refills: 0 | Status: SHIPPED
Start: 2020-12-08 | End: 2020-12-08 | Stop reason: SDUPTHER

## 2020-12-08 RX ORDER — BUSPIRONE HYDROCHLORIDE 10 MG/1
TABLET ORAL
Qty: 180 TABLET | Refills: 0 | Status: SHIPPED | OUTPATIENT
Start: 2020-12-08 | End: 2021-04-19

## 2020-12-08 RX ORDER — REPAGLINIDE 1 MG/1
TABLET ORAL
Qty: 90 TABLET | Refills: 2 | Status: SHIPPED | OUTPATIENT
Start: 2020-12-08 | Stop reason: SDUPTHER

## 2020-12-08 RX ORDER — ROPINIROLE 2 MG/1
TABLET, FILM COATED ORAL
Qty: 180 TABLET | Refills: 3 | Status: SHIPPED | OUTPATIENT
Start: 2020-12-08 | End: 2020-12-21 | Stop reason: SDUPTHER

## 2020-12-08 ASSESSMENT — PATIENT HEALTH QUESTIONNAIRE - PHQ9
2. FEELING DOWN, DEPRESSED OR HOPELESS: 0
SUM OF ALL RESPONSES TO PHQ QUESTIONS 1-9: 0
SUM OF ALL RESPONSES TO PHQ9 QUESTIONS 1 & 2: 0
1. LITTLE INTEREST OR PLEASURE IN DOING THINGS: 0

## 2020-12-08 ASSESSMENT — ENCOUNTER SYMPTOMS
NAUSEA: 0
SHORTNESS OF BREATH: 0
DIARRHEA: 0
ABDOMINAL PAIN: 0
VOMITING: 0

## 2020-12-08 NOTE — PROGRESS NOTES
Visit Information    Have you changed or started any medications since your last visit including any over-the-counter medicines, vitamins, or herbal medicines? no   Have you stopped taking any of your medications? Is so, why? -  no  Are you having any side effects from any of your medications? - no    Have you seen any other physician or provider since your last visit?  no   Have you had any other diagnostic tests since your last visit?  no   Have you been seen in the emergency room and/or had an admission in a hospital since we last saw you?  no   Have you had your routine dental cleaning in the past 6 months?  no     Do you have an active MyChart account? If no, what is the barrier?   Yes    Patient Care Team:  Tricia Turk MD as PCP - General (Family Medicine)  Tricia Turk MD as PCP - [facility]  Chris Renae MD as Consulting Physician (Gastroenterology)    Medical History Review  Past Medical, Family, and Social History reviewed and does contribute to the patient presenting condition    Health Maintenance   Topic Date Due    Diabetic retinal exam  01/12/2017    Diabetic microalbuminuria test  12/13/2020    Hepatitis B vaccine (1 of 3 - Risk 3-dose series) 03/01/2021 (Originally 3/10/1980)    Shingles Vaccine (1 of 2) 03/01/2021 (Originally 3/10/2011)    Diabetic foot exam  11/12/2021 (Originally 12/1/2017)    DTaP/Tdap/Td vaccine (2 - Tdap) 12/15/2020    A1C test (Diabetic or Prediabetic)  08/04/2021    Lipid screen  08/04/2021    Potassium monitoring  12/03/2021    Creatinine monitoring  12/03/2021    Colon cancer screen colonoscopy  09/20/2022    Breast cancer screen  11/14/2022    Flu vaccine  Completed    Hepatitis C screen  Completed    HIV screen  Completed    Hepatitis A vaccine  Aged Out    Hib vaccine  Aged Out    Meningococcal (ACWY) vaccine  Aged Out    Pneumococcal 0-64 years Vaccine  Aged Out

## 2020-12-08 NOTE — PROGRESS NOTES
Attending Physician Statement  I have discussed the care of Saira Maharaj, including pertinent history and exam findings,  with the resident. I have reviewed the key elements of all parts of the encounter with the resident. I agree with the assessment, plan and orders as documented by the resident.   (GE Modifier)    Cameron Client

## 2020-12-08 NOTE — PATIENT INSTRUCTIONS
Thank you for letting us take care of you today. We hope all your questions were addressed. If a question was overlooked or something else comes to mind after you return home, please contact a member of your Care Team listed below. Your Care Team at [place] [address] is Team #5  Patricia Angulo MD (Faculty)  Mariel Thornton MD (Resident)  Rhiannon Jordan MD (Resident)  Parker Oshea MD (Resident)  Vonn Pert, LPN Arie Kawasaki. ,CHIVO BROWN, CHIVO Kinney ([address] [facility] office)  [facility] office)  Mariela Beasley, [address] (Clinical Practice Manager)  Janice Morales [facility] (Clinical Pharmacist)       Office phone number: [phone]    If you need to get in right away due to illness, please be advised we have \"Same Day\" appointments available Monday-Friday. Please call us at [phone] option #[phone] to schedule your \"Same Day\" appointment.

## 2020-12-08 NOTE — PROGRESS NOTES
Subjective:    Julita Paulson is a 62 y.o. female with  has a past medical history of Cervical spondylosis, Generalized anxiety disorder, Hyperlipidemia, Hypertension, DIEGO (nonalcoholic steatohepatitis), Obesity, Osteoarthritis of left knee, Restless legs syndrome, and Type II or unspecified type diabetes mellitus without mention of complication, not stated as uncontrolled. Presented to the office today for:  Chief Complaint   Patient presents with    Follow-up     one month followup        HPI      DM2 - recheck A1c 7.9 today. Patient stated that she her diet has been much worse for the past few months. Patient is currently on trulicity, glipizide, syngardy, prandin. Patient stated she is complaint and regularly takes all her medications. Counseled patient on improving diet. Saw ophthalmologist last week. HTN - well controlled on hygroton. Asymptomatic. No headaches, chest pain, visual changes. Left shoulder pain - currently in physical therapy. Stated minimal improvement with aquatic therapy. Uses tylenol and voltaren gel for pain control. Review of Systems   Constitutional: Negative for chills and fever. Respiratory: Negative for shortness of breath. Cardiovascular: Negative for chest pain. Gastrointestinal: Negative for abdominal pain, diarrhea, nausea and vomiting. Musculoskeletal: Positive for arthralgias (left shoulder pain). The patient has a No family history on file. Objective:    /79 (Site: Right Upper Arm, Position: Sitting, Cuff Size: Large Adult)   Pulse 62   Temp 96.7 °F (35.9 °C) (Temporal)   Ht 5' 3\" (1.6 m)   Wt 199 lb (90.3 kg)   SpO2 96%   BMI 35.25 kg/m²    BP Readings from Last 3 Encounters:   12/08/20 116/79   11/12/20 128/80   09/29/20 103/65       Physical Exam  Vitals signs reviewed. HENT:      Head: Normocephalic and atraumatic. Cardiovascular:      Rate and Rhythm: Normal rate and regular rhythm. Pulses: Normal pulses.       Heart sounds: Normal heart sounds. Pulmonary:      Effort: Pulmonary effort is normal. No respiratory distress. Breath sounds: Normal breath sounds. No wheezing. Abdominal:      Palpations: Abdomen is soft. Tenderness: There is no abdominal tenderness. There is no guarding. Musculoskeletal:      Comments: Left shoulder painful abduction above 90 degrees. Tenderness to palpation of left shoulder laterally   Skin:     General: Skin is warm and dry. Neurological:      General: No focal deficit present. Mental Status: She is alert and oriented to person, place, and time. Lab Results   Component Value Date    WBC 9.9 08/05/2020    HGB 12.0 08/05/2020    HCT 39.0 08/05/2020     08/05/2020    CHOL 220 (H) 08/04/2020    TRIG 232 (H) 08/04/2020    HDL 60 08/04/2020    LDLDIRECT 156 (H) 05/20/2017    ALT 28 12/03/2020    AST 21 08/04/2020     12/03/2020    K 4.5 12/03/2020     12/03/2020    CREATININE 1.21 (H) 12/03/2020    BUN 22 (H) 12/03/2020    CO2 25 12/03/2020    TSH 2.25 08/04/2020    INR 1.0 07/23/2013    GLUF 262 (H) 09/10/2018    LABA1C 7.9 12/08/2020    LABMICR CANNOT BE CALCULATED 12/13/2019     Lab Results   Component Value Date    CALCIUM 9.0 12/03/2020     Lab Results   Component Value Date    LDLCHOLESTEROL 114 08/04/2020    LDLDIRECT 156 (H) 05/20/2017       Assessment and Plan:    1. Uncontrolled type 2 diabetes mellitus with hyperglycemia (HCC)  - A1c 7.9 today  - continue current medication regimen  - counseled patient on improving her diet  - Microalbumin, Ur; Future  - POCT glycosylated hemoglobin (Hb A1C)    2. Essential hypertension  - BP stable  - continue current medication regimen hygroton and lisinopril    3. Chronic left shoulder pain  - current doing aquatic PT, 2 sessions left  - minimal improvement  - voltaren and tylenol PRN for pain  - MRI SHOULDER LEFT WO CONTRAST; Future    4.  Restless legs syndrome  - rOPINIRole (REQUIP) 2 MG tablet; TAKE TWO TABLETS BY MOUTH NIGHTLY  Dispense: 180 tablet; Refill: 3    5. Generalized anxiety disorder  - busPIRone (BUSPAR) 10 MG tablet; TAKE 1 TABLET BY MOUTH 3 TIMES A DAY  Dispense: 180 tablet; Refill: 0    6. Primary osteoarthritis, left shoulder  - MRI SHOULDER LEFT WO CONTRAST; Future    7. Tendinopathy of left shoulder  - MRI SHOULDER LEFT WO CONTRAST; Future          Requested Prescriptions     Signed Prescriptions Disp Refills    rOPINIRole (REQUIP) 2 MG tablet 180 tablet 3     Sig: TAKE TWO TABLETS BY MOUTH NIGHTLY    busPIRone (BUSPAR) 10 MG tablet 180 tablet 0     Sig: TAKE 1 TABLET BY MOUTH 3 TIMES A DAY       Medications Discontinued During This Encounter   Medication Reason    rOPINIRole (REQUIP) 2 MG tablet REORDER    busPIRone (BUSPAR) 10 MG tablet REORDER       Kavya received counseling on the following healthy behaviors: nutrition, exercise and medication adherence    Discussed use,benefit, and side effects of prescribed medications. Barriers to medication compliance addressed. All patient questions answered. Pt voiced understanding. Return in about 4 weeks (around 1/5/2021) for left shoulder pain. Disclaimer: Some orall of this note was transcribed using voice-recognition software. This may cause typographical errors occasionally. Although all effort is made to fix these errors, please do not hesitate to contact our office if there Manual Jackson concern with the understanding of this note.

## 2020-12-09 ENCOUNTER — HOSPITAL ENCOUNTER (OUTPATIENT)
Dept: PHYSICAL THERAPY | Age: 60
Setting detail: THERAPIES SERIES
Discharge: HOME | End: 2020-12-09
Payer: COMMERCIAL

## 2020-12-09 PROCEDURE — 97113 AQUATIC THERAPY/EXERCISES: CPT

## 2020-12-09 ASSESSMENT — PAIN DESCRIPTION - FREQUENCY: FREQUENCY: CONTINUOUS

## 2020-12-09 ASSESSMENT — PAIN DESCRIPTION - PAIN TYPE: TYPE: CHRONIC PAIN

## 2020-12-09 ASSESSMENT — PAIN SCALES - GENERAL: PAINLEVEL_OUTOF10: 5

## 2020-12-09 ASSESSMENT — PAIN DESCRIPTION - LOCATION: LOCATION: SHOULDER

## 2020-12-09 ASSESSMENT — PAIN DESCRIPTION - DESCRIPTORS: DESCRIPTORS: CONSTANT;ACHING

## 2020-12-09 ASSESSMENT — PAIN DESCRIPTION - ORIENTATION: ORIENTATION: LEFT;ANTERIOR

## 2020-12-09 NOTE — FLOWSHEET NOTE
[address]   Outpatient Physical Therapy  [address] [facility]. [address]  Phone: [phone]  Fax: [phone]  Daily Progress Note    Date: 20    Patient Name: Tiarra Tran        MRN: [id number]  Account: [de-identified] : 1960      General Information:  Chart Reviewed: Yes  Patient assessed for rehabilitation services?: Yes  Referring Practitioner: Marie Barrera  Referral Date : 20  Diagnosis: Left shoulder pain  Follows Commands: Within Functional Limits  Onset Date: 06/15/20  PT Insurance Information: Medical [place]   Total # of Visits Approved: 12  Total # of Visits to Date: 10  No Show: 0  Canceled Appointment: 3    Subjective:  Subjective: Pt reports decreased pain in shoulder the last few days. notes ROM has not changed but pain decreased. states always feels better after exercises in pool. Pain:  Patient Currently in Pain: Yes  Pain Assessment: 0-10  Pain Level: 5  Pain Type: Chronic pain  Pain Location: Shoulder  Pain Orientation: Left; Anterior  Pain Descriptors: Constant; Aching  Pain Frequency: Continuous       Objective:    [address] [facility] Exercise Log  Protocol Fibromyalgia     Date of Eval: 2020       Primary PT: Siomara Coronado PT, DPT  Diagnosis: Fibromyalgia/(L) shoulder pain   Things to Focus On (goals): Pain Control, ROM   Surgical Precautions:  Medical Precautions: DM, hyperlipidemia, hypertension, restless leg syndrome, obesity  []? C-9 dates  []? Occ Med   []?  Medicare      Name: Sissy Rose   Date 11/3/20 11/5/20 11/9/20 12/7/20 12/9/20   Visit # 6/12 7/12 8/12 9/12  10/12   Walk F/L/R 2 Laps w/ UE ROM 2 Laps w/ UE ROM 2 Laps w/ UE ROM  2 Laps w/ UE ROM  2 Laps W/ UE ROM   LE Exercise             Marching 10x 10x 12x 12x 12x    Squats 10x5\" 10x5\" 12x5\" 12x5\"  12x5\"   Step-Ups F/L         F Low 10x   Heel-toe raises 10x 10x 12x 12x  12x   SLR F/L/R 10x 10x 12x 12x  12x   HS Curl         12x   Lunges           Time: 40 3     Duarte Goldstein, PTA

## 2020-12-14 ENCOUNTER — HOSPITAL ENCOUNTER (OUTPATIENT)
Dept: PHYSICAL THERAPY | Age: 60
Setting detail: THERAPIES SERIES
Discharge: HOME | End: 2020-12-14
Payer: COMMERCIAL

## 2020-12-14 ENCOUNTER — HOSPITAL ENCOUNTER (OUTPATIENT)
Dept: MRI IMAGING | Facility: CLINIC | Age: 60
Discharge: HOME | End: 2020-12-16
Payer: COMMERCIAL

## 2020-12-14 PROCEDURE — 97113 AQUATIC THERAPY/EXERCISES: CPT

## 2020-12-14 PROCEDURE — 73221 MRI JOINT UPR EXTREM W/O DYE: CPT

## 2020-12-14 ASSESSMENT — PAIN DESCRIPTION - ORIENTATION: ORIENTATION: LEFT;ANTERIOR

## 2020-12-14 ASSESSMENT — PAIN SCALES - GENERAL: PAINLEVEL_OUTOF10: 7

## 2020-12-14 ASSESSMENT — PAIN DESCRIPTION - LOCATION: LOCATION: SHOULDER

## 2020-12-14 ASSESSMENT — PAIN DESCRIPTION - PAIN TYPE: TYPE: CHRONIC PAIN

## 2020-12-14 NOTE — FLOWSHEET NOTE
[address]   Outpatient Physical Therapy  [address] [facility]. [address]  Phone: [phone]  Fax: [phone]  Daily Progress Note    Date: 20    Patient Name: Darrell Serrano        MRN: [id number]  Account: [de-identified] : 1960      General Information  Referring Practitioner: Domi Loera  Referral Date : 20  Diagnosis: Left shoulder pain  Onset Date: 06/15/20  PT Insurance Information: Medical [place]   Total # of Visits Approved: 12  Total # of Visits to Date: 11  No Show: 0  Canceled Appointment: 3    Subjective  States shoulder moves better while in the water and notes a little of improvement/carry over but still has limitations. Had MRI today. Pain:  Patient Currently in Pain: Yes  Pain Assessment: 0-10  Pain Level: 7  Pain Type: Chronic pain  Pain Location: Shoulder  Pain Orientation: Left; Anterior    Objective  Priscella Mainland. [facility]   [facility] Exercise Log  Protocol Fibromyalgia     Date of Eval: 2020       Primary PT: Stefan Dang PT, DPT  Diagnosis: Fibromyalgia/(L) shoulder pain   Things to Focus On (goals): Pain Control, ROM   Surgical Precautions:  Medical Precautions: DM, hyperlipidemia, hypertension, restless leg syndrome, obesity  []? C-9 dates  []? Occ Med   []?  Medicare      Name: Kelly Held   Date 20   Visit # 8/12 9/12  10/12 11/12   Walk F/L/R 2 Laps w/ UE ROM  2 Laps w/ UE ROM  2 Laps W/ UE ROM 2 Laps w/ UE ROM   LE Exercise          Marching 12x 12x 12x  12x   Squats 12x5\" 12x5\"  12x5\" 12x5\"   Step-Ups F/L     F Low 10x Low F 10x   Heel-toe raises 12x 12x  12x 12x   SLR F/L/R 12x 12x  12x 12x   HS Curl     12x 12x   Lunges          Knee flex/ext 10x 12x  12x 12x   UE exercises          Retro Shrugs 10x 10x  12x 12x   Horiz abd/add 10x 10x  12x 12x   Fwd flex 10x 10x  12x 12x   Abd/add 10x 10x  12x 12x   PNF      10x 12x   Bicep Curls 10x 10x  12x 12x   IR/ER 10x 10x  12x 12x Wall Push-Ups                     Ball Shapes Sm Ball  5x each  cw/ccw Small Ball  5x each  cw/ccw  Small ball  5x ea dir  Cw/CCW Small Ball  5x each  cw/ccw              Kickboard Ex. Small Small  Small Small   ROM w/ kickboard 3x10\"  3-Ways 3x10\"  3-Ways  3x10\"  3-ways 3x10\"  3-ways   Push Down   5x  5x 5x   Push-pull      10x 10x   Paddling          Breast-stroke 2 Laps 2 Laps  2 Laps 2 Laps   Rope pull                     UE Stretches          Corner stretch          Post. Capsule stretch          LE Stretches          Hamstring          Achilles          Quad                     Pain Rating 5 8  5 8        Comment:  Reviewed importance of postural awareness and core stability    Assessment  Body structures, Functions, Activity limitations: Decreased functional mobility ; Decreased ADL status; Decreased ROM; Decreased strength;Decreased endurance;Decreased balance;Decreased high-level IADLs  Treatment Diagnosis: (L) shoulder pain with mobility deficits  Prognosis: Fair  REQUIRES PT FOLLOW UP: Yes    Activity Tolerance  Patient Tolerated treatment well  Comments: Progressed with speed to patients tolerance.  Encouraged proper technique and posture    Plan:  Continue with current plan(1 more visit and DC)    Therapy Time:  Time In: 1558  Time Out: 1637  Minutes: 39  Timed Code Treatment Minutes: 38 Minutes    Treatment Charges: Minutes Units   []  Ultrasound     []  Electrical-Stim     []  Iontophoresis     []  Traction     []  Massage       []  Eval     []  Gait     []  Vasopneumatic Device     []  Ther Exercise       []  Manual Therapy       []  Ther Activities       [x]  Aquatics 38 3   []  Neuro Re-Ed       []  Other       Total Treatment Time: 45 [id number] [place], [place]

## 2020-12-14 NOTE — TELEPHONE ENCOUNTER
Last visit: 12/08/20  Last Med refill: 11/13/20  Does patient have enough medication for 72 hours: Yes    Next Visit Date: None Scheduled  Future Appointments   Date Time Provider Nicola Zaidi   12/14/2020  3:00 PM [facility] MRI RM 1 [place] [facility] [place]   12/14/2020  4:30 PM Fredyfrida Palmer, PTA [facility] PT [place]   12/16/2020  4:30 PM Fredy Palmer  [facility] Maintenance   Topic Date Due    Diabetic retinal exam  01/12/2017    Diabetic microalbuminuria test  12/13/2020    Hepatitis B vaccine (1 of 3 - Risk 3-dose series) 03/01/2021 (Originally 3/10/1980)    Shingles Vaccine (1 of 2) 03/01/2021 (Originally 3/10/2011)    Diabetic foot exam  11/12/2021 (Originally 12/1/2017)    DTaP/Tdap/Td vaccine (2 - Tdap) 12/15/2020    Lipid screen  08/04/2021    Potassium monitoring  12/03/2021    Creatinine monitoring  12/03/2021    A1C test (Diabetic or Prediabetic)  12/08/2021    Colon cancer screen colonoscopy  09/20/2022    Breast cancer screen  11/14/2022    Flu vaccine  Completed    Pneumococcal 0-64 years Vaccine  Completed    Hepatitis C screen  Completed    HIV screen  Completed    Hepatitis A vaccine  Aged Out    Hib vaccine  Aged Out    Meningococcal (ACWY) vaccine  Aged Out       Hemoglobin A1C (%)   Date Value   12/08/2020 7.9   08/04/2020 7.5   09/26/2019 7.4             ( goal A1C is < 7)   Microalb/Crt.  Ratio (mcg/mg creat)   Date Value   12/13/2019 CANNOT BE CALCULATED     LDL Cholesterol (mg/dL)   Date Value   08/04/2020 114   06/24/2019 42       (goal LDL is <100)   AST (U/L)   Date Value   08/04/2020 21     ALT (U/L)   Date Value   12/03/2020 28     BUN (mg/dL)   Date Value   12/03/2020 22 (H)     BP Readings from Last 3 Encounters:   12/08/20 116/79   11/12/20 128/80   09/29/20 103/65          (goal 120/80)    All Future Testing planned in [facility]  Lab Frequency Next Occurrence   Microalbumin, Ur Once 01/07/2021   MRI SHOULDER LEFT WO CONTRAST Once 12/08/2020               Patient Active Problem List:     Cervical spondylosis     Type II or unspecified type diabetes mellitus without mention of complication, not stated as uncontrolled     Hypertension     Abnormal auditory perception     Eustachian tube dysfunction     Chronic serous otitis media     Nasal polyps     Nasal congestion     Osteoarthritis of left knee     Osteoarthritis of right knee     DIEGO (nonalcoholic steatohepatitis)     Vitamin D deficiency     Iron deficiency anemia     Dyslipidemia     Diabetes mellitus type 2, uncontrolled (HCC)     Left hip pain     Trochanteric bursitis     Fatigue     Daytime somnolence     Snoring     Chronic left shoulder pain     Restless legs syndrome     Generalized anxiety disorder     Primary osteoarthritis, left shoulder     Tendinopathy of left shoulder

## 2020-12-16 ENCOUNTER — TELEPHONE (OUTPATIENT)
Dept: FAMILY MEDICINE CLINIC | Age: 60
End: 2020-12-16

## 2020-12-16 ENCOUNTER — NURSE ONLY (OUTPATIENT)
Dept: PRIMARY CARE CLINIC | Age: 60
End: 2020-12-16

## 2020-12-16 ENCOUNTER — HOSPITAL ENCOUNTER (OUTPATIENT)
Age: 60
Setting detail: SPECIMEN
Discharge: HOME | End: 2020-12-16
Payer: COMMERCIAL

## 2020-12-16 ENCOUNTER — HOSPITAL ENCOUNTER (OUTPATIENT)
Dept: PHYSICAL THERAPY | Age: 60
Setting detail: THERAPIES SERIES
End: 2020-12-16
Payer: COMMERCIAL

## 2020-12-16 RX ORDER — REPAGLINIDE 1 MG/1
TABLET ORAL
Qty: 90 TABLET | Refills: 2 | Status: SHIPPED | OUTPATIENT
Start: 2020-12-16 | End: 2021-04-20 | Stop reason: ALTCHOICE

## 2020-12-16 RX ORDER — CYCLOBENZAPRINE HCL 10 MG
TABLET ORAL
Qty: 30 TABLET | Refills: 0 | Status: SHIPPED | OUTPATIENT
Start: 2020-12-16 | End: 2021-01-12

## 2020-12-16 NOTE — PROGRESS NOTES
[address]   Outpatient Physical Therapy  Cancel/No Show Note-DISCHARGE    Date: 20    Patient Name: Courtney Mcallister        MRN: [id number]  Account: [de-identified] : 1960      General Information  Referring Practitioner: Pina Orourke  Referral Date : 20  Diagnosis: Left shoulder pain  Onset Date: 06/15/20  PT Insurance Information: [employer] [place]   Total # of Visits Approved: 12  Total # of Visits to Date: 11  No Show: 0  Canceled Appointment: 4    Comments: CANCELLED- [address] [place] [address]. [facility] TODAY.  WISHES TO DC AT THIS TIME- WILL CALL TO RESUME IF DR [facility]ARD [facility] MORE THERAPY    Renato Erickson, PTA

## 2020-12-16 NOTE — TELEPHONE ENCOUNTER
Pt called in stating she has no smell, aches, congestion and headaches.  Pt stated she called Dr. Han Gutiérrez and Dr. Han Gutiérrez told her she is out of town and to call the office to have someone put a order in for a COVID test.

## 2020-12-18 LAB — SARS-COV-2, NAA: NOT DETECTED

## 2020-12-20 ENCOUNTER — TELEPHONE (OUTPATIENT)
Dept: FAMILY MEDICINE CLINIC | Age: 60
End: 2020-12-20

## 2020-12-21 RX ORDER — ROPINIROLE 2 MG/1
TABLET, FILM COATED ORAL
Qty: 14 TABLET | Refills: 0 | Status: SHIPPED | OUTPATIENT
Start: 2020-12-21 | End: 2021-07-26

## 2020-12-28 RX ORDER — ASPIRIN 81 MG/1
TABLET ORAL
Qty: 30 TABLET | Refills: 2 | Status: SHIPPED | OUTPATIENT
Start: 2020-12-28 | End: 2021-03-08

## 2020-12-28 RX ORDER — DULAGLUTIDE 1.5 MG/.5ML
INJECTION, SOLUTION SUBCUTANEOUS
Qty: 2 PEN | Refills: 2 | Status: SHIPPED | OUTPATIENT
Start: 2020-12-28 | End: 2021-03-18

## 2020-12-28 NOTE — TELEPHONE ENCOUNTER
trulicity pending for refill     Health Maintenance   Topic Date Due    Diabetic retinal exam  01/12/2017    Diabetic microalbuminuria test  12/13/2020    DTaP/Tdap/Td vaccine (2 - Tdap) 12/15/2020    Hepatitis B vaccine (1 of 3 - Risk 3-dose series) 03/01/2021 (Originally 3/10/1980)    Shingles Vaccine (1 of 2) 03/01/2021 (Originally 3/10/2011)    Diabetic foot exam  11/12/2021 (Originally 12/1/2017)    Lipid screen  08/04/2021    Potassium monitoring  12/03/2021    Creatinine monitoring  12/03/2021    A1C test (Diabetic or Prediabetic)  12/08/2021    Colon cancer screen colonoscopy  09/20/2022    Breast cancer screen  11/14/2022    Flu vaccine  Completed    Pneumococcal 0-64 years Vaccine  Completed    Hepatitis C screen  Completed    HIV screen  Completed    Hepatitis A vaccine  Aged Out    Hib vaccine  Aged Out    Meningococcal (ACWY) vaccine  Aged Out             (applicable per patient's age: Cancer Screenings, Depression Screening, Fall Risk Screening, Immunizations)    Hemoglobin A1C (%)   Date Value   12/08/2020 7.9   08/04/2020 7.5   09/26/2019 7.4     Microalb/Crt. Ratio (mcg/mg creat)   Date Value   12/13/2019 CANNOT BE CALCULATED     LDL Cholesterol (mg/dL)   Date Value   08/04/2020 114     AST (U/L)   Date Value   08/04/2020 21     ALT (U/L)   Date Value   12/03/2020 28     BUN (mg/dL)   Date Value   12/03/2020 22 (H)      (goal A1C is < 7)   (goal LDL is <100) need 30-50% reduction from baseline     BP Readings from Last 3 Encounters:   12/08/20 116/79   11/12/20 128/80   09/29/20 103/65    (goal /80)      All Future Testing planned in [facility]:  Lab Frequency Next Occurrence   Microalbumin, Ur Once 01/07/2021       Next Visit Date:  No future appointments.          Patient Active Problem List:     Cervical spondylosis     Type II or unspecified type diabetes mellitus without mention of complication, not stated as uncontrolled     Hypertension     Abnormal auditory perception Eustachian tube dysfunction     Chronic serous otitis media     Nasal polyps     Nasal congestion     Osteoarthritis of left knee     Osteoarthritis of right knee     DIEGO (nonalcoholic steatohepatitis)     Vitamin D deficiency     Iron deficiency anemia     Dyslipidemia     Diabetes mellitus type 2, uncontrolled (HCC)     Left hip pain     Trochanteric bursitis     Fatigue     Daytime somnolence     Snoring     Chronic left shoulder pain     Restless legs syndrome     Generalized anxiety disorder     Primary osteoarthritis, left shoulder     Tendinopathy of left shoulder

## 2020-12-28 NOTE — TELEPHONE ENCOUNTER
Aspirin pending for refill     Health Maintenance   Topic Date Due    Diabetic retinal exam  01/12/2017    Diabetic microalbuminuria test  12/13/2020    DTaP/Tdap/Td vaccine (2 - Tdap) 12/15/2020    Hepatitis B vaccine (1 of 3 - Risk 3-dose series) 03/01/2021 (Originally 3/10/1980)    Shingles Vaccine (1 of 2) 03/01/2021 (Originally 3/10/2011)    Diabetic foot exam  11/12/2021 (Originally 12/1/2017)    Lipid screen  08/04/2021    Potassium monitoring  12/03/2021    Creatinine monitoring  12/03/2021    A1C test (Diabetic or Prediabetic)  12/08/2021    Colon cancer screen colonoscopy  09/20/2022    Breast cancer screen  11/14/2022    Flu vaccine  Completed    Pneumococcal 0-64 years Vaccine  Completed    Hepatitis C screen  Completed    HIV screen  Completed    Hepatitis A vaccine  Aged Out    Hib vaccine  Aged Out    Meningococcal (ACWY) vaccine  Aged Out             (applicable per patient's age: Cancer Screenings, Depression Screening, Fall Risk Screening, Immunizations)    Hemoglobin A1C (%)   Date Value   12/08/2020 7.9   08/04/2020 7.5   09/26/2019 7.4     Microalb/Crt. Ratio (mcg/mg creat)   Date Value   12/13/2019 CANNOT BE CALCULATED     LDL Cholesterol (mg/dL)   Date Value   08/04/2020 114     AST (U/L)   Date Value   08/04/2020 21     ALT (U/L)   Date Value   12/03/2020 28     BUN (mg/dL)   Date Value   12/03/2020 22 (H)      (goal A1C is < 7)   (goal LDL is <100) need 30-50% reduction from baseline     BP Readings from Last 3 Encounters:   12/08/20 116/79   11/12/20 128/80   09/29/20 103/65    (goal /80)      All Future Testing planned in [facility]:  Lab Frequency Next Occurrence   Microalbumin, Ur Once 01/07/2021       Next Visit Date:  No future appointments.          Patient Active Problem List:     Cervical spondylosis     Type II or unspecified type diabetes mellitus without mention of complication, not stated as uncontrolled     Hypertension     Abnormal auditory perception

## 2021-01-12 RX ORDER — EMPAGLIFLOZIN AND METFORMIN HYDROCHLORIDE 5; 1000 MG/1; MG/1
TABLET ORAL
Qty: 180 TABLET | Refills: 2 | Status: SHIPPED | OUTPATIENT
Start: 2021-01-12 | End: 2021-11-01

## 2021-01-12 RX ORDER — CYCLOBENZAPRINE HCL 10 MG
TABLET ORAL
Qty: 30 TABLET | Refills: 0 | Status: SHIPPED | OUTPATIENT
Start: 2021-01-12 | End: 2021-02-17

## 2021-01-12 NOTE — TELEPHONE ENCOUNTER
Last visit:   Last Med refill:   Does patient have enough medication for 72 hours: No:     Next Visit Date:  Future Appointments   Date Time Provider Nicola Gabrieli   1/21/2021  9:00 AM Curt Quinones MD [address] [place] [address] Maintenance   Topic Date Due    Diabetic retinal exam  01/12/2017    Diabetic microalbuminuria test  12/13/2020    DTaP/Tdap/Td vaccine (2 - Tdap) 12/15/2020    Hepatitis B vaccine (1 of 3 - Risk 3-dose series) 03/01/2021 (Originally 3/10/1980)    Shingles Vaccine (1 of 2) 03/01/2021 (Originally 3/10/2011)    Diabetic foot exam  11/12/2021 (Originally 12/1/2017)    Lipid screen  08/04/2021    Potassium monitoring  12/03/2021    Creatinine monitoring  12/03/2021    A1C test (Diabetic or Prediabetic)  12/08/2021    Colon cancer screen colonoscopy  09/20/2022    Breast cancer screen  11/14/2022    Flu vaccine  Completed    Pneumococcal 0-64 years Vaccine  Completed    Hepatitis C screen  Completed    HIV screen  Completed    Hepatitis A vaccine  Aged Out    Hib vaccine  Aged Out    Meningococcal (ACWY) vaccine  Aged Out       Hemoglobin A1C (%)   Date Value   12/08/2020 7.9   08/04/2020 7.5   09/26/2019 7.4             ( goal A1C is < 7)   Microalb/Crt.  Ratio (mcg/mg creat)   Date Value   12/13/2019 CANNOT BE CALCULATED     LDL Cholesterol (mg/dL)   Date Value   08/04/2020 114   06/24/2019 42       (goal LDL is <100)   AST (U/L)   Date Value   08/04/2020 21     ALT (U/L)   Date Value   12/03/2020 28     BUN (mg/dL)   Date Value   12/03/2020 22 (H)     BP Readings from Last 3 Encounters:   12/08/20 116/79   11/12/20 128/80   09/29/20 103/65          (goal 120/80)    All Future Testing planned in [facility]  Lab Frequency Next Occurrence   Microalbumin, Ur Once 01/07/2021               Patient Active Problem List:     Cervical spondylosis     Type II or unspecified type diabetes mellitus without mention of complication, not stated as uncontrolled     Hypertension Abnormal auditory perception     Eustachian tube dysfunction     Chronic serous otitis media     Nasal polyps     Nasal congestion     Osteoarthritis of left knee     Osteoarthritis of right knee     DIEGO (nonalcoholic steatohepatitis)     Vitamin D deficiency     Iron deficiency anemia     Dyslipidemia     Diabetes mellitus type 2, uncontrolled (HCC)     Left hip pain     Trochanteric bursitis     Fatigue     Daytime somnolence     Snoring     Chronic left shoulder pain     Restless legs syndrome     Generalized anxiety disorder     Primary osteoarthritis, left shoulder     Tendinopathy of left shoulder           Please address the medication refill and close the encounter. If I can be of assistance, please route to the applicable pool. Thank you.

## 2021-01-21 ENCOUNTER — OFFICE VISIT (OUTPATIENT)
Dept: FAMILY MEDICINE CLINIC | Age: 61
End: 2021-01-21
Payer: COMMERCIAL

## 2021-01-21 ENCOUNTER — HOSPITAL ENCOUNTER (OUTPATIENT)
Facility: CLINIC | Age: 61
Discharge: HOME | End: 2021-01-23
Payer: COMMERCIAL

## 2021-01-21 ENCOUNTER — HOSPITAL ENCOUNTER (OUTPATIENT)
Dept: GENERAL RADIOLOGY | Facility: CLINIC | Age: 61
Discharge: HOME | End: 2021-01-23
Payer: COMMERCIAL

## 2021-01-21 VITALS
DIASTOLIC BLOOD PRESSURE: 77 MMHG | TEMPERATURE: 97.4 F | HEIGHT: 63 IN | WEIGHT: 199.8 LBS | BODY MASS INDEX: 35.4 KG/M2 | SYSTOLIC BLOOD PRESSURE: 121 MMHG | HEART RATE: 89 BPM

## 2021-01-21 DIAGNOSIS — R05.3 CHRONIC COUGH: Primary | ICD-10-CM

## 2021-01-21 DIAGNOSIS — R05.3 CHRONIC COUGH: ICD-10-CM

## 2021-01-21 DIAGNOSIS — M19.012 OSTEOARTHRITIS OF LEFT SHOULDER, UNSPECIFIED OSTEOARTHRITIS TYPE: ICD-10-CM

## 2021-01-21 PROCEDURE — 99213 OFFICE O/P EST LOW 20 MIN: CPT | Performed by: FAMILY MEDICINE

## 2021-01-21 PROCEDURE — 71046 X-RAY EXAM CHEST 2 VIEWS: CPT

## 2021-01-21 PROCEDURE — 99211 OFF/OP EST MAY X REQ PHY/QHP: CPT | Performed by: FAMILY MEDICINE

## 2021-01-21 RX ORDER — ALBUTEROL SULFATE 90 UG/1
2 AEROSOL, METERED RESPIRATORY (INHALATION) EVERY 4 HOURS PRN
Qty: 2 INHALER | Refills: 5 | Status: SHIPPED | OUTPATIENT
Start: 2021-01-21 | End: 2021-04-20 | Stop reason: SDUPTHER

## 2021-01-21 RX ORDER — DIPHENHYDRAMINE HCL 25 MG
5 CAPSULE ORAL 3 TIMES DAILY
Qty: 1 BOTTLE | Refills: 1 | Status: SHIPPED | OUTPATIENT
Start: 2021-01-21 | End: 2021-02-04 | Stop reason: ALTCHOICE

## 2021-01-21 ASSESSMENT — PATIENT HEALTH QUESTIONNAIRE - PHQ9
SUM OF ALL RESPONSES TO PHQ QUESTIONS 1-9: 0
SUM OF ALL RESPONSES TO PHQ QUESTIONS 1-9: 0

## 2021-01-21 NOTE — PROGRESS NOTES
Visit Information    Have you changed or started any medications since your last visit including any over-the-counter medicines, vitamins, or herbal medicines? no   Have you stopped taking any of your medications? Is so, why? -  no  Are you having any side effects from any of your medications? - no    Have you seen any other physician or provider since your last visit?  no   Have you had any other diagnostic tests since your last visit?  no   Have you been seen in the emergency room and/or had an admission in a hospital since we last saw you?  no   Have you had your routine dental cleaning in the past 6 months?  no     Do you have an active MyChart account? If no, what is the barrier?   Yes    Patient Care Team:  Jamal Zavaleta MD as PCP - General (Family Medicine)  Jamal Zavaleta MD as PCP - [facility]  Bill Flanagan MD as Consulting Physician (Gastroenterology)    Medical History Review  Past Medical, Family, and Social History reviewed and does contribute to the patient presenting condition    Health Maintenance   Topic Date Due    Diabetic retinal exam  01/12/2017    Diabetic microalbuminuria test  12/13/2020    DTaP/Tdap/Td vaccine (2 - Tdap) 12/15/2020    Hepatitis B vaccine (1 of 3 - Risk 3-dose series) 03/01/2021 (Originally 3/10/1980)    Shingles Vaccine (1 of 2) 03/01/2021 (Originally 3/10/2011)    Diabetic foot exam  11/12/2021 (Originally 12/1/2017)    Lipid screen  08/04/2021    Potassium monitoring  12/03/2021    Creatinine monitoring  12/03/2021    A1C test (Diabetic or Prediabetic)  12/08/2021    Colon cancer screen colonoscopy  09/20/2022    Breast cancer screen  11/14/2022    Flu vaccine  Completed    Pneumococcal 0-64 years Vaccine  Completed    Hepatitis C screen  Completed    HIV screen  Completed    Hepatitis A vaccine  Aged Out    Hib vaccine  Aged Out    Meningococcal (ACWY) vaccine  Aged Out

## 2021-01-21 NOTE — PROGRESS NOTES
Subjective:       Brandon Yuan is a 62 y.o. female here for evaluation of a cough. The cough is non-productive, with wheezing, waxing and waning over time and is aggravated by nothing . Sometimes wakes her from sleep     Onset of symptoms was 2 months ago, stable since that time. Associated symptoms include NA. She has tried OTC Cough suppressants with not much improvement. Patient does not have a history of asthma. Patient has not had recent travel. Patient does not have a history of smoking. Patient  has not had a previous chest x-ray. Patient has not had a PPD done. No weight loss/ no night sweats/ ADL are not affected. he has been tested for COVID-19-Negative in Dec 2020  Patient's medications, allergies, past medical, surgical, social and family histories were reviewed and updated as appropriate. Review of Systems  Ears, nose, mouth, throat, and face: negative  Respiratory: positive for cough  Cardiovascular: negative  Gastrointestinal: negative  Genitourinary:negative  Allergic/Immunologic: negative       Objective:      Oxygen saturation 94% on room air  /77 (Site: Right Upper Arm, Position: Sitting, Cuff Size: Large Adult)   Pulse 89   Temp 97.4 °F (36.3 °C) (Temporal)   Ht 5' 3\" (1.6 m)   Wt 199 lb 12.8 oz (90.6 kg)   BMI 35.39 kg/m²   General appearance: alert, appears stated age and cooperative  Throat: lips, mucosa, and tongue normal; teeth and gums normal  Neck: no adenopathy and thyroid not enlarged, symmetric, no tenderness/mass/nodules  Lungs: wheezes base - Bilateral and bilaterally  Heart: regular rate and rhythm, S1, S2 normal, no murmur, click, rub or gallop  Abdomen: soft, non-tender; bowel sounds normal; no masses,  no organomegaly  Extremities: extremities normal, atraumatic, no cyanosis or edema      Assessment:   Chronic cough- C XR/ Cough suppressant  ordered.  Sleep propped   Gastroesophageal Reflux and Reactive Airway Disease Chronic L shoulder pain- DJD AC joint- refer to Dr. Daryl Sagastume:      Explained lack of efficacy of antibiotics in viral disease. Antitussives per medication orders. Avoid exposure to tobacco smoke and fumes. B-agonist inhaler. Call if shortness of breath worsens, blood in sputum, change in character of cough, development of fever or chills, inability to maintain nutrition and hydration. Avoid exposure to tobacco smoke and fumes.     Weight loss advised

## 2021-01-21 NOTE — PATIENT INSTRUCTIONS
Patient Education        Cough: Care Instructions  Your Care Instructions     A cough is your body's response to something that bothers your throat or airways. Many things can cause a cough. You might cough because of a cold or the flu, bronchitis, or asthma. Smoking, postnasal drip, allergies, and stomach acid that backs up into your throat also can cause coughs. A cough is a symptom, not a disease. Most coughs stop when the cause, such as a cold, goes away. You can take a few steps at home to cough less and feel better. Follow-up care is a key part of your treatment and safety. Be sure to make and go to all appointments, and call your doctor if you are having problems. It's also a good idea to know your test results and keep a list of the medicines you take. How can you care for yourself at home? · Drink lots of water and other fluids. This helps thin the mucus and soothes a dry or sore throat. Honey or lemon juice in hot water or tea may ease a dry cough. · Take cough medicine as directed by your doctor. · Prop up your head on pillows to help you breathe and ease a dry cough. · Try cough drops to soothe a dry or sore throat. Cough drops don't stop a cough. Medicine-flavored cough drops are no better than candy-flavored drops or hard candy. · Do not smoke. Avoid secondhand smoke. If you need help quitting, talk to your doctor about stop-smoking programs and medicines. These can increase your chances of quitting for good. When should you call for help? Call 911 anytime you think you may need emergency care. For example, call if:    · You have severe trouble breathing. Call your doctor now or seek immediate medical care if:    · You cough up blood.     · You have new or worse trouble breathing.     · You have a new or higher fever.     · You have a new rash.    Watch closely for changes in your health, and be sure to contact your doctor if:   · You cough more deeply or more often, especially if you notice more mucus or a change in the color of your mucus.     · You have new symptoms, such as a sore throat, an earache, or sinus pain.     · You do not get better as expected. Where can you learn more? Go to https://chpepiceweb.ParkVu. org and sign in to your Dafitit account. Enter D279 in the Shoette box to learn more about \"Cough: Care Instructions. \"     If you do not have an account, please click on the \"Sign Up Now\" link. Current as of: February 24, 2020               Content Version: 12.6  © 7587-3369 D[employer]iv[employer]. Care instructions adapted under license by [facility] ([facility]). If you have questions about a medical condition or this instruction, always ask your healthcare professional. [place] [address] any warranty or liability for your use of this information. Patient Education        Chronic Cough: Care Instructions  Your Care Instructions     A cough is your body's response to something that bothers your throat or airways. Many things can cause a cough. You might cough because of a cold or the flu, bronchitis, or asthma. Smoking, postnasal drip, allergies, and stomach acid that backs up into your throat also can cause a cough. A cough can be short-term (acute) or long-term (chronic). A chronic cough lasts more than 3 weeks. A chronic cough is often caused by a long-term problem, such as asthma. Another cause might be a medicine, such as an ACE inhibitor. A cough is a symptom, not a disease. To treat a chronic cough, you may need to treat the problem that causes it. You can take a few steps at home to cough less and feel better. Some people cough or clear their throat out of habit for no clear reason. Follow-up care is a key part of your treatment and safety. Be sure to make and go to all appointments, and call your doctor if you are having problems. It's also a good idea to know your test results and keep a list of the medicines you take. How can you care for yourself at home? · Drink plenty of water and other fluids. This may help soothe a dry or sore throat. Honey or lemon juice in hot water or tea may ease a dry cough. · Prop up your head on pillows to help you breathe and ease a cough. · Do not smoke or allow others to smoke around you. Smoke can make a cough worse. If you need help quitting, talk to your doctor about stop-smoking programs and medicines. These can increase your chances of quitting for good. · Avoid exposure to smoke, dust, or other pollutants, or wear a face mask. Check with your doctor or pharmacist to find out which type of face mask will give you the most benefit. · Take cough medicine as directed by your doctor. · Try cough drops to soothe a dry or sore throat. Cough drops don't stop a cough. Medicine-flavored cough drops are no better than candy-flavored drops or hard candy. Throat clearing  When you have a chronic cough or a disease that may cause this type of cough, you may often feel like you want to clear your throat. This helps bring up mucus. But throat clearing does not always have a cause. Throat clearing can become a habit. The more you do it, the more you feel like you need to do it. But frequent throat clearing can be hard on your vocal cords. It's like slamming them together. To help lessen throat clearing, you can try:  · Taking small sips of water. · Not clearing your throat when you feel you need to. · Swallowing hard when you want to clear your throat. You may want to ask your doctor if a medicine that thins mucus would help. When should you call for help? Call 911 anytime you think you may need emergency care.  For example, call if:   · You have severe trouble breathing. Call your doctor now or seek immediate medical care if:    · You cough up blood.     · You have new or worse trouble breathing.     · You have a new or higher fever. Watch closely for changes in your health, and be sure to contact your doctor if:    · You cough more deeply or more often, especially if you notice more mucus or a change in the color of your mucus.     · You do not get better as expected. Where can you learn more? Go to https://Spredfast.Robotics Inventions. org and sign in to your Worlize account. Enter Q899 in the Apriva box to learn more about \"Chronic Cough: Care Instructions. \"     If you do not have an account, please click on the \"Sign Up Now\" link. Current as of: February 24, 2020               Content Version: 12.6  © 1057-1078 Soysuper[employer]. Care instructions adapted under license by [facility] ([facility]). If you have questions about a medical condition or this instruction, always ask your healthcare professional. [place] [address] any warranty or liability for your use of this information. Thank you for letting us take care of you today. We hope all your questions were addressed. If a question was overlooked or something else comes to mind after you return home, please contact a member of your Care Team listed below. Your Care Team at [place] [address] is Team #1  Smith De Los Santos MD (Faculty)  Razia Bang (Resident)  Nakul Ryder MD (Resident)  Adolfo Jackson, SH[facility] office)  Jeanie Arellano, [facility] (Clinical Practice Manager)  Gerson Coronado, [address] [facility] (Clinical Pharmacist)     Office phone number: [phone]    If you need to get in right away due to illness, please be advised we have \"Same Day\" appointments available Monday-Friday. Please call us at [phone] option #3 to schedule your \"Same Day\" appointment.

## 2021-01-27 ENCOUNTER — OFFICE VISIT (OUTPATIENT)
Dept: ORTHOPEDIC SURGERY | Age: 61
End: 2021-01-27
Payer: COMMERCIAL

## 2021-01-27 VITALS — HEIGHT: 63 IN | BODY MASS INDEX: 35.44 KG/M2 | WEIGHT: 200 LBS

## 2021-01-27 DIAGNOSIS — M25.512 LEFT SHOULDER PAIN, UNSPECIFIED CHRONICITY: ICD-10-CM

## 2021-01-27 DIAGNOSIS — M75.02 ADHESIVE CAPSULITIS OF LEFT SHOULDER: Primary | ICD-10-CM

## 2021-01-27 PROCEDURE — 99244 OFF/OP CNSLTJ NEW/EST MOD 40: CPT | Performed by: ORTHOPAEDIC SURGERY

## 2021-01-27 ASSESSMENT — ENCOUNTER SYMPTOMS
APNEA: 0
ABDOMINAL PAIN: 0
COUGH: 0
CHEST TIGHTNESS: 0
VOMITING: 0

## 2021-01-27 NOTE — LETTER
Dr. Emma Devine  [address] [facility] [address]  Dept: [phone]  Dept Fax: [phone]        2/1/21    Patient: Kelechi Velasco  YOB: 1960    Dear Noam Ernst MD,    I had the pleasure of seeing one of your patients, Luis RAI today in the office. Below are the relevant portions of my assessment and plan of care. IMPRESSION:  1. Adhesive capsulitis of left shoulder    2. Left shoulder pain, unspecified chronicity      PLAN:  Discussed etiology and natural history of left frozen shoulder. The treatment options may include oral anti-inflammatories, bracing, injections, advanced imaging, activity modification, physical therapy and/or surgical intervention. Since the patient has tried formal therapy without relief, we discussed CRISTOBAL of the left shoulder. The patient would like to proceed with Manipulation of the left shoulder under anesthesia and formal therapy after surgery 5 days a week for 2 weeks. The patient will follow up in the office 2 weeks after surgery. We discussed that the patient should call us with any concerns or questions. Thank you for allowing me to participate in the care of this patient. I will keep you updated on this patient's follow up and I look forward to serving you and your patients again in the future. Please don't hesitate to contact me at my mobile number [phone].         Luda Houston

## 2021-01-27 NOTE — PROGRESS NOTES
[facility]  [address] [place] [address]  Dept: [phone]    Ambulatory Orthopedic Consult      CHIEF COMPLAINT:    Chief Complaint   Patient presents with    Shoulder Pain     left       HISTORY OF PRESENT ILLNESS:      The patient is a 62 y.o. female who is being seen at the request of  Smith De Los Santos MD for consultation and evaluation of  Left shoulder pain. Jemal Han  is a 62 y.o. Right hand dominant  female who has had left shoulder pain for 1 year(s). The patient has not any trauma to the shoulder. The pain is  worse at night and when doing overhead activities. Weakness of the shoulder has been noted. The pain restricts activities such as overhead work. The pain has not improved with time. The patient does notice loss in ROM of the shoulder. The pain is improved with resting and activity modification. Physical therapy has been tried from 2020 until 2020 without relief. Corticosteriod injection has not been administered. There has not been previous history of surgery performed on the shoulder. MRI completed on 2020.          Past Medical History:    Past Medical History:   Diagnosis Date    Cervical spondylosis 2009    c-4 c-5, c-5 c-6, c-6 c-7    Generalized anxiety disorder 2020    Hyperlipidemia     Hypertension     DIEGO (nonalcoholic steatohepatitis) 10/12/2014    Obesity     Osteoarthritis of left knee 2014    Restless legs syndrome     Type II or unspecified type diabetes mellitus without mention of complication, not stated as uncontrolled        Past Surgical History:    Past Surgical History:   Procedure Laterality Date     SECTION      COLONOSCOPY  2012    [facility]    HYSTERECTOMY, TOTAL ABDOMINAL  2008    Polymenorrhagia    UPPER GASTROINTESTINAL ENDOSCOPY  2012    [facility]       Current Medications: Current Outpatient Medications   Medication Sig Dispense Refill    albuterol sulfate HFA (PROVENTIL HFA) 108 (90 Base) MCG/ACT inhaler Inhale 2 puffs into the lungs every 4 hours as needed for Wheezing 2 Inhaler 5    Dextromethorphan HBr (VICKS DAYQUIL COUGH) 15 MG/15ML LIQD Take 5 mLs by mouth three times daily 1 Bottle 1    cyclobenzaprine (FLEXERIL) 10 MG tablet TAKE ONE TABLET BY MOUTH NIGHTLY AS NEEDED FOR MUSCLE SPASMS 30 tablet 0    SYNJARDY 5-1000 MG TABS TAKE 1 TABLET BY MOUTH 2 TIMES A  tablet 2    ASPIRIN ADULT LOW STRENGTH 81 MG EC tablet TAKE 1 TABLET BY MOUTH ONE TIME A DAY 30 tablet 2    TRULICITY 1.5 DQ/4.6RE SOPN INJECT THE CONTENTS OF ONE SYRINGE UNDER THE SKIN ONCE WEEKLY 2 pen 2    rOPINIRole (REQUIP) 2 MG tablet TAKE TWO TABLETS BY MOUTH NIGHTLY 14 tablet 0    repaglinide (PRANDIN) 1 MG tablet TAKE 1 TABLET BY MOUTH 3 TIMES A DAY BEFORE MEALS 90 tablet 2    busPIRone (BUSPAR) 10 MG tablet TAKE 1 TABLET BY MOUTH 3 TIMES A  tablet 0    glipiZIDE (GLUCOTROL) 10 MG tablet TAKE 1 TABLET BY MOUTH 2 TIMES A DAY BEFORE MEALS (REPLACES GLYBURIDE) 180 tablet 1    rosuvastatin (CRESTOR) 20 MG tablet Take 1 tablet by mouth nightly 30 tablet 3    Prodigy Lancets 28G MISC Use to test once daily and as needed as directed by provider 100 each 3    blood glucose test strips (PRODIGY NO CODING BLOOD GLUC) strip Use to test once daily and as needed as directed by provider 100 each 3    diclofenac sodium (VOLTAREN) 1 % GEL Apply 2 g topically 2 times daily 100 g 2    lisinopril (PRINIVIL;ZESTRIL) 40 MG tablet TAKE 1 TABLET BY MOUTH ONE TIME A DAY 90 tablet 2    chlorthalidone (HYGROTON) 25 MG tablet TAKE 1 TABLET BY MOUTH ONE TIME A DAY 90 tablet 1    omeprazole (PRILOSEC) 20 MG delayed release capsule Take 1 capsule by mouth 2 times daily 180 capsule 3    ondansetron (ZOFRAN) 4 MG tablet Take 1 tablet by mouth 3 times daily as needed for Nausea or Vomiting 30 tablet 0  acetaminophen (ACETAMINOPHEN EXTRA STRENGTH) 500 MG tablet Take 1,000 mg by mouth daily as needed for Pain      bimatoprost (LUMIGAN) 0.01 % SOLN ophthalmic drops Place 1 drop into both eyes nightly      ezetimibe (ZETIA) 10 MG tablet Take 1 tablet by mouth daily 90 tablet 3    PRODIGY LANCETS 28G MISC 1 Bottle by Does not apply route three times daily 100 each 3    Blood Glucose Monitoring Suppl (PRODIGY AUTOCODE BLOOD GLUCOSE) w/Device KIT 1 Device by Does not apply route 3 times daily 1 kit 0     No current facility-administered medications for this visit. Allergies:    Codeine    Social History:   Social History     Socioeconomic History    Marital status:      Spouse name: Not on file    Number of children: Not on file    Years of education: Not on file    Highest education level: Not on file   Occupational History    Not on file   Social Needs    Financial resource strain: Not on file    Food insecurity     Worry: Not on file     Inability: Not on file    Transportation needs     Medical: Not on file     Non-medical: Not on file   Tobacco Use    Smoking status: Never Smoker    Smokeless tobacco: Never Used   Substance and Sexual Activity    Alcohol use:  Yes     Alcohol/week: 0.0 standard drinks     Comment: rarely/ 4 times a year    Drug use: No    Sexual activity: Yes   Lifestyle    Physical activity     Days per week: Not on file     Minutes per session: Not on file    Stress: Not on file   Relationships    Social connections     Talks on phone: Not on file     Gets together: Not on file     Attends Sikh service: Not on file     Active member of club or organization: Not on file     Attends meetings of clubs or organizations: Not on file     Relationship status: Not on file    Intimate partner violence     Fear of current or ex partner: Not on file     Emotionally abused: Not on file     Physically abused: Not on file     Forced sexual activity: Not on file Other Topics Concern    Not on file   Social History Narrative    Not on file       Family History:  No family history on file. REVIEW OF SYSTEMS:  Review of Systems   Constitutional: Negative for chills and fever. Respiratory: Negative for apnea, cough and chest tightness. Cardiovascular: Negative for chest pain and palpitations. Gastrointestinal: Negative for abdominal pain and vomiting. Genitourinary: Negative for difficulty urinating. Musculoskeletal: Positive for arthralgias (left shoulder). Negative for gait problem, joint swelling and myalgias. Neurological: Negative for dizziness, weakness and numbness. I have reviewed the CC, HPI, ROS, PMH, FHX, Social History, and if not present in this note, I have reviewed in the patient's chart. I agree with the documentation provided by other staff and have reviewed their documentation prior to providing my signature indicating agreement. PHYSICAL EXAM:  Ht 5' 3\" (1.6 m)   Wt 200 lb (90.7 kg)   BMI 35.43 kg/m²  Body mass index is 35.43 kg/m². Physical Exam  Gen: alert and oriented to person and place. Psych:  Appropriate affect; Appropriate knowledge base; Appropriate mood; No hallucinations; Head: normocephalic, atraumatic   Chest: symmetric chest excursion; nonlabored respiratory effort. Pelvis: stable; no obvious pelvis deformity  Ortho Exam  Extremity:  No significant outward deformity of the left shoulder is appreciated. No signs of trauma or swelling or signs of infection left shoulder noted.   Active range of motion of the left shoulder is f=90 left shoulder, AB=70 left shoulder. PROM with scapular stabilization left shoulder F=50, AB=40, ER=10, IR=40. Rotator cuff strength appears to be mostly preserved to the left shoulder. No gross instability of the left shoulder is appreciated. Patient has full range of motion cervical spine and left elbow. Motor, sensory, vascular examination of the left upper extremity is grossly intact without focal deficits.     Radiology:   Narrative   EXAMINATION:   MRI OF THE LEFT SHOULDER WITHOUT CONTRAST   12/14/2020 2:49 pm       TECHNIQUE:   Multiplanar multisequence MRI of the left shoulder was performed without the   administration of intravenous contrast.       COMPARISON:   Left shoulder plain radiographs from 09/29/2020       HISTORY:   ORDERING SYSTEM PROVIDED HISTORY: Chronic left shoulder pain   TECHNOLOGIST PROVIDED HISTORY:   chronic left shoulder pain, minimal improvement after PT   Reason for Exam:   Pt states left shoulder pain radiating into left upper arm   pt states limited ROM and tightness x  A few mths   Acuity: Chronic   Type of Exam: Subsequent/Follow-up   Additional signs and symptoms: left shoulder pain       60-year-old female with chronic left shoulder pain       FINDINGS:   ROTATOR CUFF: Trace fluid in the subacromial-subdeltoid bursa.       Low-grade partial-thickness interstitial tearing of mid supraspinatus near   the footplate on image 11, series 7.  Moderate underlying supraspinatus   tendinopathy.       Low-grade partial-thickness articular-surface tearing of anterior superior   infraspinatus along the footplate.  Mild underlying infraspinatus   tendinopathy.       Mild subscapularis tendinopathy.       Teres minor muscle/tendon appears grossly intact without evidence of tearing.       No significant atrophy or fatty degeneration of the visualized rotator cuff   musculature.       BICEPS TENDON: Long head of biceps tendon properly located in the bicipital groove and seen extending to the biceps labral anchor.       LABRUM: Mild diffuse labral degeneration.       GLENOHUMERAL JOINT: Small glenohumeral joint effusion.  Thickening of the   inferior glenohumeral ligament.  Mild-to-moderate glenohumeral chondromalacia   posteriorly.       AC JOINT AND ACROMIOCLAVICULAR ARCH: Mild degenerative change of the left AC   joint.  Type 2 acromion.       BONE MARROW: Mild marrow edema about the [facility] joint.  Subcortical cystic   changes at the posterolateral humeral head.  Bone marrow signal intensity   within the visualized osseous structures otherwise within normal limits.  No   acute fracture or dislocation involving the osseous components of the   shoulder.       OUTLET SPACES: Suprascapular notch and quadrilateral space grossly   unremarkable in appearance.       No left axillary lymphadenopathy.           Impression   1. Low-grade partial-thickness interstitial tearing of mid supraspinatus near   the footplate.  Moderate underlying supraspinatus tendinopathy. 2. Low-grade partial-thickness articular-surface tearing of anterior superior   infraspinatus along the footplate.  Mild underlying infraspinatus   tendinopathy. 3. Mild subscapularis tendinopathy. 4. Mild diffuse labral degeneration.  Small glenohumeral joint effusion. 5. Thickening of the inferior glenohumeral ligament can be seen with adhesive   capsulitis. 6. Mild-to-moderate glenohumeral chondromalacia. 7. Mild degenerative change of the left AC joint           ASSESSMENT:     1. Adhesive capsulitis of left shoulder    2.  Left shoulder pain, unspecified chronicity         PLAN: Discussed etiology and natural history of left frozen shoulder. The treatment options may include oral anti-inflammatories, bracing, injections, advanced imaging, activity modification, physical therapy and/or surgical intervention. Since the patient has tried formal therapy without relief, we discussed CRISTOBAL of the left shoulder. The patient would like to proceed with Manipulation of the left shoulder under anesthesia and formal therapy after surgery 5 days a week for 2 weeks. The patient will follow up in the office 2 weeks after surgery. We discussed that the patient should call us with any concerns or questions. Return for Two weeks postoperatively. No orders of the defined types were placed in this encounter. Orders Placed This Encounter   Procedures    XR SHOULDER LEFT (MIN 2 VIEWS)     Standing Status:   Future     Number of Occurrences:   1     Standing Expiration Date:   1/27/2022     IGregory LPN am scribing for and in the presence of Dr. Ida Cruz  2/1/2021 7:55 AM    I have reviewed and made changes accordingly to the work scribed by Gregory Nelson LPN. The documentation accurately reflects work and decisions made by me. I have also reviewed documentation completed by clinical staff.     Ida Cruz DO, [address] [facility]  2/1/2021 7:56 AM    This note is created with the assistance of a speech recognition program.  While intending to generate a document that actually reflects the content of the visit, the document can still have some errors including those of syntax and sound a like substitutions which may escape proof reading.  In such instances, actual meaning can be extrapolated by contextual diversion      Electronically signed by Lonny Sandoval DO, FAOAO on 2/1/2021 at 7:55 AM

## 2021-02-02 ENCOUNTER — TELEPHONE (OUTPATIENT)
Dept: ORTHOPEDIC SURGERY | Age: 61
End: 2021-02-02

## 2021-02-02 DIAGNOSIS — Z01.818 PRE-OP TESTING: Primary | ICD-10-CM

## 2021-02-02 DIAGNOSIS — M75.02 ADHESIVE CAPSULITIS OF LEFT SHOULDER: Primary | ICD-10-CM

## 2021-02-04 ENCOUNTER — TELEPHONE (OUTPATIENT)
Dept: PHARMACY | Facility: CLINIC | Age: 61
End: 2021-02-04

## 2021-02-04 ENCOUNTER — HOSPITAL ENCOUNTER (OUTPATIENT)
Dept: PREADMISSION TESTING | Age: 61
Discharge: HOME | End: 2021-02-08
Payer: COMMERCIAL

## 2021-02-04 ENCOUNTER — ANESTHESIA EVENT (OUTPATIENT)
Dept: OPERATING ROOM | Age: 61
End: 2021-02-04
Payer: COMMERCIAL

## 2021-02-04 ENCOUNTER — HOSPITAL ENCOUNTER (OUTPATIENT)
Dept: GENERAL RADIOLOGY | Age: 61
Discharge: HOME | End: 2021-02-06
Payer: COMMERCIAL

## 2021-02-04 VITALS
RESPIRATION RATE: 16 BRPM | SYSTOLIC BLOOD PRESSURE: 116 MMHG | WEIGHT: 199 LBS | DIASTOLIC BLOOD PRESSURE: 71 MMHG | TEMPERATURE: 96 F | HEART RATE: 81 BPM | OXYGEN SATURATION: 100 % | HEIGHT: 63 IN | BODY MASS INDEX: 35.26 KG/M2

## 2021-02-04 DIAGNOSIS — Z01.818 PRE-OP TESTING: ICD-10-CM

## 2021-02-04 LAB
ALBUMIN SERPL-MCNC: 4 G/DL (ref 3.5–5.2)
ALBUMIN/GLOBULIN RATIO: 1.3 (ref 1–2.5)
ALP BLD-CCNC: 96 U/L (ref 35–104)
ALT SERPL-CCNC: 20 U/L (ref 5–33)
ANION GAP SERPL CALCULATED.3IONS-SCNC: 15 MMOL/L (ref 9–17)
AST SERPL-CCNC: 18 U/L
BILIRUB SERPL-MCNC: 0.21 MG/DL (ref 0.3–1.2)
BILIRUBIN URINE: NEGATIVE
BUN BLDV-MCNC: 25 MG/DL (ref 6–20)
BUN/CREAT BLD: ABNORMAL (ref 9–20)
CALCIUM SERPL-MCNC: 9 MG/DL (ref 8.6–10.4)
CHLORIDE BLD-SCNC: 102 MMOL/L (ref 98–107)
CO2: 22 MMOL/L (ref 20–31)
COLOR: YELLOW
COMMENT UA: ABNORMAL
CREAT SERPL-MCNC: 1.16 MG/DL (ref 0.5–0.9)
EKG ATRIAL RATE: 75 BPM
EKG P AXIS: 62 DEGREES
EKG P-R INTERVAL: 158 MS
EKG Q-T INTERVAL: 398 MS
EKG QRS DURATION: 98 MS
EKG QTC CALCULATION (BAZETT): 444 MS
EKG R AXIS: -19 DEGREES
EKG T AXIS: 15 DEGREES
EKG VENTRICULAR RATE: 75 BPM
GFR AFRICAN AMERICAN: 58 ML/MIN
GFR NON-AFRICAN AMERICAN: 48 ML/MIN
GFR SERPL CREATININE-BSD FRML MDRD: ABNORMAL ML/MIN/{1.73_M2}
GFR SERPL CREATININE-BSD FRML MDRD: ABNORMAL ML/MIN/{1.73_M2}
GLUCOSE BLD-MCNC: 90 MG/DL (ref 70–99)
GLUCOSE URINE: ABNORMAL
HCT VFR BLD CALC: 40 % (ref 36.3–47.1)
HEMOGLOBIN: 11.9 G/DL (ref 11.9–15.1)
KETONES, URINE: NEGATIVE
LEUKOCYTE ESTERASE, URINE: NEGATIVE
MCH RBC QN AUTO: 27.5 PG (ref 25.2–33.5)
MCHC RBC AUTO-ENTMCNC: 29.8 G/DL (ref 28.4–34.8)
MCV RBC AUTO: 92.4 FL (ref 82.6–102.9)
NITRITE, URINE: NEGATIVE
NRBC AUTOMATED: 0 PER 100 WBC
PDW BLD-RTO: 12.8 % (ref 11.8–14.4)
PH UA: 5.5 (ref 5–8)
PLATELET # BLD: 325 K/UL (ref 138–453)
PMV BLD AUTO: 10.3 FL (ref 8.1–13.5)
POTASSIUM SERPL-SCNC: 4.8 MMOL/L (ref 3.7–5.3)
PROTEIN UA: NEGATIVE
RBC # BLD: 4.33 M/UL (ref 3.95–5.11)
SODIUM BLD-SCNC: 139 MMOL/L (ref 135–144)
SPECIFIC GRAVITY UA: 1.01 (ref 1–1.03)
TOTAL PROTEIN: 7.1 G/DL (ref 6.4–8.3)
TURBIDITY: CLEAR
URINE HGB: NEGATIVE
UROBILINOGEN, URINE: NORMAL
WBC # BLD: 14.4 K/UL (ref 3.5–11.3)

## 2021-02-04 PROCEDURE — 81003 URINALYSIS AUTO W/O SCOPE: CPT

## 2021-02-04 PROCEDURE — 93005 ELECTROCARDIOGRAM TRACING: CPT | Performed by: ORTHOPAEDIC SURGERY

## 2021-02-04 PROCEDURE — 36415 COLL VENOUS BLD VENIPUNCTURE: CPT

## 2021-02-04 PROCEDURE — 80053 COMPREHEN METABOLIC PANEL: CPT

## 2021-02-04 PROCEDURE — 85027 COMPLETE CBC AUTOMATED: CPT

## 2021-02-04 PROCEDURE — 71046 X-RAY EXAM CHEST 2 VIEWS: CPT

## 2021-02-04 ASSESSMENT — PAIN DESCRIPTION - PAIN TYPE: TYPE: CHRONIC PAIN

## 2021-02-04 ASSESSMENT — PAIN DESCRIPTION - ORIENTATION: ORIENTATION: LEFT

## 2021-02-04 NOTE — TELEPHONE ENCOUNTER
**Spouse: Cosmo Arana also enrolled in the DM Program**    Called patient to schedule 2021 yearly pharmacist appointment to discuss medications for Diabetes Management Program.    Attempted to contact patient at the cell number but there was no answer. The TAD was full and would not accept any further messages. Left VM on home TAD: Please call back at [phone] Option #7.      Keshav Hayward, [facility]   Department, toll free: [phone], option 7

## 2021-02-04 NOTE — PROGRESS NOTES
DAY OF SURGERY/PROCEDURE  GUIDELINES    As a patient at the [facility] you can expect quality medical and nursing care that is centered on your individual needs. It is our goal to make your surgical experience as comfortable and excellent as possible.  ________________________________________________________________________    The following instructions are general guidelines, if any information on this sheet is different from what your doctor has instructed you to do, please follow your doctor's instructions. · Please arrive on time @10:30am or your procedure may be rescheduled. · Enter through front entrance C of the building. · Upon arrival you will be taken to the pre-operative area to get ready for surgery  · You will be given a specific time when you will NOT be able to eat, drink, smoke, suck or chew ANYTHING (no water, gum, mints, cigarettes, cigars, pipes, snuff, chewing tobacco, etc.) or your surgery may be canceled. This will occur during your PAT appointment or by phone 1-2 days before surgery  · Take a shower or bath on the morning of your surgery/procedure (Hibiclens if directed)  · Brush your teeth, but do not swallow any water  · IN CASE OF ILLNESS - If you have a cold or flu symptoms (high fever, runny nose, sore throat, cough, etc.) rash, nausea, vomiting, loose stools, and/or recent contact with someone who has a contagious disease (chick pox, measles, etc.) please call your doctor before coming to the surgery center  · Take a small sip of water with heart, blood pressure, and/or seizure medication the morning of surgery.  (DO NOT take blood pressure medications that contain a diuretic)  · If applicable bring your:  · Inhaler (s)(May take morning of surgery)  · Hearing aid(s)  · Eyeglasses and Case (If you wear contacts they have to be removed before surgery, bring case and solution)  · Any paperwork given to you by your doctor  · Any X-rays you were told to bring · A copy of your Living Will or Durable Power of     · DO NOT take anticoagulants (blood thinners, aspiring or aspirin-containing products) two weeks prior to your surgery. · DO NOT take any diabetic pills or insulin. Please bring your sliding scale instructions and sick day plan with you. If you have a low blood sugar reaction after midnight, drink 4 ounces of apple juice or regular pop. · Wear loose, comfortable clothing that is easy to put on and take off. · You will be returning home the same day as your surgery, you will need to have a responsible adult (25years of age or older) present to drive you home. You will need someone to stay with you at home for the first 24 hours following your surgery. This is due to the anesthesia and the medications given to you during surgery and recovery. · Your doctor may talk with your family immediately following your procedure. Depending on your needs, you will stay in the recovery room between 30 minutes to 2 hours. · While you are recovering, the surgery family waiting room  can answer many of your family's questions. All medically related questions will be answered by a medical professional. If you had outpatient surgery, you will meet your family for discharge instructions before leaving.

## 2021-02-10 NOTE — TELEPHONE ENCOUNTER
Second attempt made to contact patient to schedule 2021 yearly pharmacist appointment to discuss medications for Diabetes Management Program.    Spoke to patient and appointment scheduled for 2/15/21 at 10 AM.     Per patient she would like to schedule separate appointments for herself and her spouse Marley Mehta.     Kiko Heads, [facility] [employer]   Department, toll free: [phone], option 7

## 2021-02-13 ENCOUNTER — HOSPITAL ENCOUNTER (OUTPATIENT)
Dept: LAB | Age: 61
Setting detail: SPECIMEN
Discharge: HOME | End: 2021-02-13
Payer: COMMERCIAL

## 2021-02-13 DIAGNOSIS — Z01.818 PREOP TESTING: Primary | ICD-10-CM

## 2021-02-13 PROCEDURE — U0003 INFECTIOUS AGENT DETECTION BY NUCLEIC ACID (DNA OR RNA); SEVERE ACUTE RESPIRATORY SYNDROME CORONAVIRUS 2 (SARS-COV-2) (CORONAVIRUS DISEASE [COVID-19]), AMPLIFIED PROBE TECHNIQUE, MAKING USE OF HIGH THROUGHPUT TECHNOLOGIES AS DESCRIBED BY CMS-2020-01-R: HCPCS

## 2021-02-13 PROCEDURE — U0005 INFEC AGEN DETEC AMPLI PROBE: HCPCS

## 2021-02-15 ENCOUNTER — TELEPHONE (OUTPATIENT)
Dept: PHARMACY | Facility: CLINIC | Age: 61
End: 2021-02-15

## 2021-02-15 ENCOUNTER — SCHEDULED TELEPHONE ENCOUNTER (OUTPATIENT)
Dept: PHARMACY | Facility: CLINIC | Age: 61
End: 2021-02-15

## 2021-02-15 LAB
SARS-COV-2, RAPID: NORMAL
SARS-COV-2: NORMAL
SARS-COV-2: NOT DETECTED
SOURCE: NORMAL

## 2021-02-15 NOTE — TELEPHONE ENCOUNTER
Dr. Audie Cross MD,    Your patient is currently enrolled in the Be Well with Diabetes program. After your patient's recent visit with a [facility] Clinical Pharmacist, the below were identified as opportunities to assist with their diabetes management:  · Consider discontinuing repaglinide due to overlap of mechanism with glipizide. · Consider initiating pioglitazone 15 to 30 mg once daily. See pharmacist note dated 2/15/2121 for complete details. I would be happy to contact patient regarding any follow-up or changes in therapy!     Thank you,  Gregorio Gilmore, PharmD, [address]  Direct: [phone]  Department, toll free: [phone], option 7

## 2021-02-15 NOTE — TELEPHONE ENCOUNTER
[facility] CLINICAL PHARMACY REVIEW - Be Well with Diabetes    Yfn Rubio is a 62 y.o. female enrolled in the [address] [place] [address],[address] Floor Employee Diabetes Program. Patient provided Leslie Lundy with verbal consent to remain in the program for this year.     Medications:  Medication Sig Notes    albuterol sulfate HFA (PROVENTIL HFA) 108 (90 Base) MCG/ACT inhaler Inhale 2 puffs into the lungs every 4 hours as needed for Wheezing     cyclobenzaprine (FLEXERIL) 10 MG tablet TAKE ONE TABLET BY MOUTH NIGHTLY AS NEEDED FOR MUSCLE SPASMS     SYNJARDY 5-1000 MG TABS TAKE 1 TABLET BY MOUTH 2 TIMES A DAY     ASPIRIN ADULT LOW STRENGTH 81 MG EC tablet TAKE 1 TABLET BY MOUTH ONE TIME A DAY     TRULICITY 1.5 JM/8.3IL SOPN INJECT THE CONTENTS OF ONE SYRINGE UNDER THE SKIN ONCE WEEKLY     rOPINIRole (REQUIP) 2 MG tablet TAKE TWO TABLETS BY MOUTH NIGHTLY     repaglinide (PRANDIN) 1 MG tablet TAKE 1 TABLET BY MOUTH 3 TIMES A DAY BEFORE MEALS     busPIRone (BUSPAR) 10 MG tablet TAKE 1 TABLET BY MOUTH 3 TIMES A DAY     glipiZIDE (GLUCOTROL) 10 MG tablet TAKE 1 TABLET BY MOUTH 2 TIMES A DAY BEFORE MEALS (REPLACES GLYBURIDE)     rosuvastatin (CRESTOR) 20 MG tablet Take 1 tablet by mouth nightly     Prodigy Lancets 28G MISC Use to test once daily and as needed as directed by provider     blood glucose test strips (PRODIGY NO CODING BLOOD GLUC) strip Use to test once daily and as needed as directed by provider     diclofenac sodium (VOLTAREN) 1 % GEL Apply 2 g topically 2 times daily     lisinopril (PRINIVIL;ZESTRIL) 40 MG tablet TAKE 1 TABLET BY MOUTH ONE TIME A DAY     chlorthalidone (HYGROTON) 25 MG tablet TAKE 1 TABLET BY MOUTH ONE TIME A DAY     omeprazole (PRILOSEC) 20 MG delayed release capsule Take 1 capsule by mouth 2 times daily     ondansetron (ZOFRAN) 4 MG tablet Take 1 tablet by mouth 3 times daily as needed for Nausea or Vomiting     acetaminophen (ACETAMINOPHEN EXTRA STRENGTH) 500 MG tablet Take vaccine 12/15/2010    Influenza Virus Vaccine 12/06/2013, 11/27/2015, 11/04/2016, 10/31/2017    Influenza Whole 12/06/2013    Influenza, Quadv, 6 mo and older, IM (Fluzone, Flulaval) 10/31/2017    Influenza, Quadv, IM, PF (6 mo and older Fluzone, Flulaval, Fluarix, and 3 yrs and older Afluria) 11/04/2016, 11/16/2018, 12/05/2019, 09/29/2020    Pneumococcal Polysaccharide ([id number]) 11/27/2015      Social History:  Social History     Tobacco Use    Smoking status: Never Smoker    Smokeless tobacco: Never Used   Substance Use Topics    Alcohol use: Yes     Alcohol/week: 0.0 standard drinks     Comment: rarely/ 4 times a year     ASSESSMENT:  Initial Program Requirements (Y indicates has completed for the year, N indicates needs to be completed by 07/01/2021): No - Provider Visit for DM (1st)  No - A1c (1st)     Ongoing Program Requirements (Y indicates has completed for the year, N indicates needs to be completed by 12/31/2021): No - Provider Visit for DM (2nd)  N/a - ACC/diabetes educator visit (if A1c over 8%)  No - A1c (2nd)  No - Lipid panel  No - Urine microalbumin  Yes - Pneumococcal vaccination: Up-to-date, not needed again until age 72  No - Influenza vaccination for Fall 2021  n/a - Medication adherence over 70%; [facility] November 2020 100%  Yes - On statin or contraindication(s) rosuvastatin 20 mg tablet  Yes - On ACEi/ARB or contraindication(s) lisinopril 40 mg tablet     Current medications eligible for copay waiver, up to $600, through [employer]:  - aspirin (with prescription), glipizide, lisinopril, rosuvastatin, Synjardy, Trulicity, chlorthalidone  - Prodigy     Diabetes Care:   T2DM  - Glycemic Goal: <7.0%. Is not at blood glucose goal.  - 12/9/20 had appointment with PCP, DM discussed. Patient to focus on improving her diet. - Home blood sugar records:  fasting range: 110-190.  States she tests 1-2 times every other week  - Any episodes of hypoglycemia? occasionally if she skips breakfast; Reminded glipizide and repaglinide need taken in regards to meals  - Eye exam current (within one year): yes  - Foot exam current (within one year): yes Podiatrist 2/0/37  - Duplicate MOA: repaglinide and glipizide  - Daily aspirin? Yes  - Medication compliance: compliant most of the time  - Diet compliance: Patient states she is working to improve her diet. States a friend often makes dinner for them and it is not always \"diabetes friendly. \" States she monitors her portions though. Reviewed diet. Discussed continuing to monitor portion control, types of carbohydrates, including a non starchy vegetable with dinner.    - Current exercise: no regular exercise; walks outside when it's nicer. Discussed some at home exercises. Other Considerations:  - Blood Pressure Goal: BP less than 140/90 mmHg due to history of DM: Is at blood pressure goal.   - Lipids: Patient is prescribed high-intensity statin therapy. - Smoking status: Never smoked    PLAN:  - Consideration(s) for provider:   · Would consider discontinuing repaglinide due to overlap of mechanism with glipizide.    · Would consider initiating pioglitazone 15 to 30 mg once daily  - DM program gaps identified:   · Initial requirements: Provider Visit for DM (1st) and A1c (1st)   · Ongoing requirements: Provider visit for DM (2nd), ACC/diabetes educator visit (if A1c over 8%), A1c (2nd), Lipid panel, Urine microalbumin, Influenza vaccination for [id number] and Medication adherence over 70%   - Education to patient: Addressed diet and exercise, Discussed foot care, Reminded to get yearly retinal exam, Discussed ways to avoid symptomatic hypoglycemia and Addressed medication adherence   - Follow up: PCP for identified gaps or as scheduled below  - Upcoming appointments:   Future Appointments   Date Time Provider Nicola Zaidi   2/18/2021  9:30 AM Bo Salcido PT [place] [facility]   2/22/2021  9:30 AM Bo Salcido PT [facility] PT [facility]   2/23/2021 11:15 AM Amber Gruber, PT [facility]   2/24/2021 10:00 AM Amber Gruber, PT [facility]   2/25/2021 10:00 AM Amber Gruber, PT [facility]   3/1/2021 10:00 AM Amber Gruber, PT [facility]   3/2/2021 10:00 AM Amber Gruber, PT [facility] 1 [facility]   3/3/2021  8:40 AM [facility], DO [facility]   3/3/2021 10:00 AM Amber Gruber, PT [facility] 1 [facility]   3/4/2021 10:00 AM Amber Gruber, PT [facility] 1 [facility]   3/8/2021 10:00 AM Amber Gruber, PT [facility] 1 [facility]   3/9/2021 10:00 AM Amber Gruber, PT [facility]   3/10/2021 10:00 AM Amber Gruber, PT [facility] [place]   3/11/2021 10:00 AM Amber Gruber, PT [facility] 1 [facility]   3/18/2021  8:30 AM Dulce Reddy  [address], PharmD, [facility], [address]  Direct: [phone]  Department, toll free: [phone], option 7    CLINICAL PHARMACY CONSULT: MED RECONCILIATION/REVIEW ADDENDUM    For Pharmacy Admin Tracking Only    PHSO: Yes  Total # of Interventions Recommended: 2  - Discontinued Medication #: 1 Discontinue Reason(s): Duplicate  - New Order #: 1 New Medication Order Reason(s): Needs Additional Medication Therapy  Recommended intervention potential cost savings: 1  Total Interventions Accepted: 1  Time Spent (min): 45

## 2021-02-16 ENCOUNTER — TELEPHONE (OUTPATIENT)
Dept: INTERNAL MEDICINE | Age: 61
End: 2021-02-16

## 2021-02-16 NOTE — TELEPHONE ENCOUNTER
Last visit: 1/21/21  Last Med refill: 1/18/21  Does patient have enough medication for 72 hours: Yes    Next Visit Date:  Future Appointments   Date Time Provider Nicola Dyan   2/18/2021  9:30 AM Olive Pencil, PT [facility]   2/22/2021  9:30 AM Olive Pencil, PT [facility]   2/23/2021 11:15 AM Olive Pencil, PT [facility]   2/24/2021 10:00 AM Olive Pencil, PT [facility]   2/25/2021 10:00 AM Olive Pencil, PT [facility]   3/1/2021 10:00 AM Olive Pencil, PT [facility]   3/2/2021 10:00 AM Olive Pencil, PT [facility]   3/3/2021  8:40 AM DO LANCE Vieira [facility]   3/3/2021 10:00 AM Olive Pencil, PT [facility]   3/4/2021 10:00 AM Olive Pencil, PT [facility]   3/8/2021 10:00 AM Olive Pencil, PT [facility]   3/9/2021 10:00 AM Olive Pencil, PT [facility]   3/10/2021 10:00 AM Olive Pencil, PT [facility]   3/11/2021 10:00 AM Olive Pencil, PT [facility]   3/18/2021  8:30 AM Sofi Leyva MD [address] [place] [address] Maintenance   Topic Date Due    Diabetic retinal exam  01/12/2017    Diabetic microalbuminuria test  12/13/2020    DTaP/Tdap/Td vaccine (2 - Tdap) 12/15/2020    Hepatitis B vaccine (1 of 3 - Risk 3-dose series) 03/01/2021 (Originally 3/10/1980)    Shingles Vaccine (1 of 2) 03/01/2021 (Originally 3/10/2011)    Diabetic foot exam  11/12/2021 (Originally 12/1/2017)    Lipid screen  08/04/2021    A1C test (Diabetic or Prediabetic)  12/08/2021    Potassium monitoring  02/04/2022    Creatinine monitoring  02/04/2022    Colon cancer screen colonoscopy  09/20/2022    Breast cancer screen  11/14/2022    Flu vaccine  Completed    Pneumococcal 0-64 years Vaccine  Completed    Hepatitis C screen  Completed    HIV screen  Completed    Hepatitis A vaccine  Aged Out    Hib vaccine  Aged Out    Meningococcal (ACWY) vaccine  Aged Out       Hemoglobin A1C (%)   Date Value   12/08/2020 7.9   08/04/2020 7.5   09/26/2019 7.4             ( goal A1C is < 7)   Microalb/Crt.  Ratio (mcg/mg creat)   Date Value   12/13/2019 CANNOT BE CALCULATED     LDL Cholesterol (mg/dL)   Date Value   08/04/2020 114   06/24/2019 42       (goal LDL is <100)   AST (U/L)   Date Value   02/04/2021 18     ALT (U/L)   Date Value   02/04/2021 20     BUN (mg/dL)   Date Value   02/04/2021 25 (H)     BP Readings from Last 3 Encounters:   02/04/21 116/71   01/21/21 121/77   12/08/20 116/79          (goal 120/80)    All Future Testing planned in [facility]  Lab Frequency Next Occurrence   Microalbumin, Ur Once 12/08/2021   EKG 12 Lead Once 02/09/2021   COVID-19 Once 02/12/2021               Patient Active Problem List:     Cervical spondylosis     Type II or unspecified type diabetes mellitus without mention of complication, not stated as uncontrolled     Hypertension     Abnormal auditory perception     Eustachian tube dysfunction     Chronic serous otitis media     Nasal polyps     Nasal congestion     Osteoarthritis of left knee     Osteoarthritis of right knee     DIEGO (nonalcoholic steatohepatitis)     Vitamin D deficiency     Iron deficiency anemia     Dyslipidemia     Diabetes mellitus type 2, uncontrolled (HCC)     Left hip pain     Trochanteric bursitis     Fatigue     Daytime somnolence     Snoring     Chronic left shoulder pain     Restless legs syndrome     Generalized anxiety disorder     Primary osteoarthritis, left shoulder     Tendinopathy of left shoulder

## 2021-02-17 ENCOUNTER — HOSPITAL ENCOUNTER (OUTPATIENT)
Age: 61
Setting detail: OUTPATIENT SURGERY
Discharge: HOME | End: 2021-02-17
Attending: ORTHOPAEDIC SURGERY | Admitting: ORTHOPAEDIC SURGERY
Payer: COMMERCIAL

## 2021-02-17 ENCOUNTER — ANESTHESIA (OUTPATIENT)
Dept: OPERATING ROOM | Age: 61
End: 2021-02-17
Payer: COMMERCIAL

## 2021-02-17 VITALS
RESPIRATION RATE: 18 BRPM | HEART RATE: 81 BPM | WEIGHT: 199.6 LBS | TEMPERATURE: 97.2 F | HEIGHT: 63 IN | SYSTOLIC BLOOD PRESSURE: 101 MMHG | BODY MASS INDEX: 35.37 KG/M2 | OXYGEN SATURATION: 100 % | DIASTOLIC BLOOD PRESSURE: 64 MMHG

## 2021-02-17 VITALS — DIASTOLIC BLOOD PRESSURE: 60 MMHG | SYSTOLIC BLOOD PRESSURE: 108 MMHG | OXYGEN SATURATION: 100 %

## 2021-02-17 DIAGNOSIS — M75.02 ADHESIVE CAPSULITIS OF LEFT SHOULDER: Primary | ICD-10-CM

## 2021-02-17 LAB — GLUCOSE BLD-MCNC: 113 MG/DL (ref 65–105)

## 2021-02-17 PROCEDURE — 6360000002 HC RX W HCPCS

## 2021-02-17 PROCEDURE — 3600000002 HC SURGERY LEVEL 2 BASE: Performed by: ORTHOPAEDIC SURGERY

## 2021-02-17 PROCEDURE — 64415 NJX AA&/STRD BRCH PLXS IMG: CPT | Performed by: ANESTHESIOLOGY

## 2021-02-17 PROCEDURE — C9290 INJ, BUPIVACAINE LIPOSOME: HCPCS | Performed by: ANESTHESIOLOGY

## 2021-02-17 PROCEDURE — 2709999900 HC NON-CHARGEABLE SUPPLY: Performed by: ORTHOPAEDIC SURGERY

## 2021-02-17 PROCEDURE — 2500000003 HC RX 250 WO HCPCS: Performed by: ANESTHESIOLOGY

## 2021-02-17 PROCEDURE — 7100000001 HC PACU RECOVERY - ADDTL 15 MIN: Performed by: ORTHOPAEDIC SURGERY

## 2021-02-17 PROCEDURE — 97161 PT EVAL LOW COMPLEX 20 MIN: CPT

## 2021-02-17 PROCEDURE — 97110 THERAPEUTIC EXERCISES: CPT

## 2021-02-17 PROCEDURE — 6360000002 HC RX W HCPCS: Performed by: ORTHOPAEDIC SURGERY

## 2021-02-17 PROCEDURE — 82947 ASSAY GLUCOSE BLOOD QUANT: CPT

## 2021-02-17 PROCEDURE — 7100000000 HC PACU RECOVERY - FIRST 15 MIN: Performed by: ORTHOPAEDIC SURGERY

## 2021-02-17 PROCEDURE — 7100000011 HC PHASE II RECOVERY - ADDTL 15 MIN: Performed by: ORTHOPAEDIC SURGERY

## 2021-02-17 PROCEDURE — 2500000003 HC RX 250 WO HCPCS: Performed by: ORTHOPAEDIC SURGERY

## 2021-02-17 PROCEDURE — 23700 MNPJ ANES SHO JT FIXJ APRATS: CPT | Performed by: ORTHOPAEDIC SURGERY

## 2021-02-17 PROCEDURE — 6360000002 HC RX W HCPCS: Performed by: NURSE ANESTHETIST, CERTIFIED REGISTERED

## 2021-02-17 PROCEDURE — 7100000010 HC PHASE II RECOVERY - FIRST 15 MIN: Performed by: ORTHOPAEDIC SURGERY

## 2021-02-17 PROCEDURE — 6360000002 HC RX W HCPCS: Performed by: ANESTHESIOLOGY

## 2021-02-17 PROCEDURE — 3700000000 HC ANESTHESIA ATTENDED CARE: Performed by: ORTHOPAEDIC SURGERY

## 2021-02-17 RX ORDER — OXYCODONE HYDROCHLORIDE AND ACETAMINOPHEN 5; 325 MG/1; MG/1
2 TABLET ORAL PRN
Status: DISCONTINUED | OUTPATIENT
Start: 2021-02-17 | End: 2021-02-17 | Stop reason: HOSPADM

## 2021-02-17 RX ORDER — LABETALOL 20 MG/4 ML (5 MG/ML) INTRAVENOUS SYRINGE
5 EVERY 10 MIN PRN
Status: DISCONTINUED | OUTPATIENT
Start: 2021-02-17 | End: 2021-02-17 | Stop reason: HOSPADM

## 2021-02-17 RX ORDER — MEPERIDINE HYDROCHLORIDE 50 MG/ML
12.5 INJECTION INTRAMUSCULAR; INTRAVENOUS; SUBCUTANEOUS EVERY 5 MIN PRN
Status: DISCONTINUED | OUTPATIENT
Start: 2021-02-17 | End: 2021-02-17 | Stop reason: HOSPADM

## 2021-02-17 RX ORDER — LIDOCAINE HYDROCHLORIDE 10 MG/ML
1 INJECTION, SOLUTION EPIDURAL; INFILTRATION; INTRACAUDAL; PERINEURAL
Status: DISCONTINUED | OUTPATIENT
Start: 2021-02-17 | End: 2021-02-17 | Stop reason: HOSPADM

## 2021-02-17 RX ORDER — BUPIVACAINE HYDROCHLORIDE 5 MG/ML
INJECTION, SOLUTION EPIDURAL; INTRACAUDAL
Status: COMPLETED | OUTPATIENT
Start: 2021-02-17 | End: 2021-02-17

## 2021-02-17 RX ORDER — MIDAZOLAM HYDROCHLORIDE 1 MG/ML
INJECTION INTRAMUSCULAR; INTRAVENOUS
Status: COMPLETED
Start: 2021-02-17 | End: 2021-02-17

## 2021-02-17 RX ORDER — MIDAZOLAM HYDROCHLORIDE 2 MG/2ML
2 INJECTION, SOLUTION INTRAMUSCULAR; INTRAVENOUS ONCE
Status: COMPLETED | OUTPATIENT
Start: 2021-02-17 | End: 2021-02-17

## 2021-02-17 RX ORDER — METHYLPREDNISOLONE ACETATE 80 MG/ML
INJECTION, SUSPENSION INTRA-ARTICULAR; INTRALESIONAL; INTRAMUSCULAR; SOFT TISSUE PRN
Status: DISCONTINUED | OUTPATIENT
Start: 2021-02-17 | End: 2021-02-17 | Stop reason: ALTCHOICE

## 2021-02-17 RX ORDER — OXYCODONE HYDROCHLORIDE AND ACETAMINOPHEN 5; 325 MG/1; MG/1
1 TABLET ORAL PRN
Status: DISCONTINUED | OUTPATIENT
Start: 2021-02-17 | End: 2021-02-17 | Stop reason: HOSPADM

## 2021-02-17 RX ORDER — FENTANYL CITRATE 50 UG/ML
100 INJECTION, SOLUTION INTRAMUSCULAR; INTRAVENOUS ONCE
Status: COMPLETED | OUTPATIENT
Start: 2021-02-17 | End: 2021-02-17

## 2021-02-17 RX ORDER — PROPOFOL 10 MG/ML
INJECTION, EMULSION INTRAVENOUS PRN
Status: DISCONTINUED | OUTPATIENT
Start: 2021-02-17 | End: 2021-02-17 | Stop reason: SDUPTHER

## 2021-02-17 RX ORDER — CYCLOBENZAPRINE HCL 10 MG
TABLET ORAL
Qty: 30 TABLET | Refills: 0 | Status: SHIPPED | OUTPATIENT
Start: 2021-02-17 | End: 2021-02-19

## 2021-02-17 RX ORDER — BUPIVACAINE HYDROCHLORIDE 2.5 MG/ML
INJECTION, SOLUTION EPIDURAL; INFILTRATION; INTRACAUDAL PRN
Status: DISCONTINUED | OUTPATIENT
Start: 2021-02-17 | End: 2021-02-17 | Stop reason: ALTCHOICE

## 2021-02-17 RX ORDER — MIDAZOLAM HYDROCHLORIDE 1 MG/ML
INJECTION INTRAMUSCULAR; INTRAVENOUS PRN
Status: DISCONTINUED | OUTPATIENT
Start: 2021-02-17 | End: 2021-02-17 | Stop reason: SDUPTHER

## 2021-02-17 RX ORDER — ONDANSETRON 2 MG/ML
4 INJECTION INTRAMUSCULAR; INTRAVENOUS
Status: DISCONTINUED | OUTPATIENT
Start: 2021-02-17 | End: 2021-02-17 | Stop reason: HOSPADM

## 2021-02-17 RX ORDER — FENTANYL CITRATE 50 UG/ML
INJECTION, SOLUTION INTRAMUSCULAR; INTRAVENOUS
Status: COMPLETED
Start: 2021-02-17 | End: 2021-02-17

## 2021-02-17 RX ORDER — SODIUM CHLORIDE 0.9 % (FLUSH) 0.9 %
10 SYRINGE (ML) INJECTION EVERY 12 HOURS SCHEDULED
Status: DISCONTINUED | OUTPATIENT
Start: 2021-02-17 | End: 2021-02-17 | Stop reason: HOSPADM

## 2021-02-17 RX ORDER — SODIUM CHLORIDE 0.9 % (FLUSH) 0.9 %
10 SYRINGE (ML) INJECTION PRN
Status: DISCONTINUED | OUTPATIENT
Start: 2021-02-17 | End: 2021-02-17 | Stop reason: HOSPADM

## 2021-02-17 RX ORDER — 0.9 % SODIUM CHLORIDE 0.9 %
500 INTRAVENOUS SOLUTION INTRAVENOUS
Status: DISCONTINUED | OUTPATIENT
Start: 2021-02-17 | End: 2021-02-17 | Stop reason: HOSPADM

## 2021-02-17 RX ORDER — PROMETHAZINE HYDROCHLORIDE 25 MG/ML
12.5 INJECTION, SOLUTION INTRAMUSCULAR; INTRAVENOUS
Status: DISCONTINUED | OUTPATIENT
Start: 2021-02-17 | End: 2021-02-17 | Stop reason: HOSPADM

## 2021-02-17 RX ORDER — DIPHENHYDRAMINE HYDROCHLORIDE 50 MG/ML
12.5 INJECTION INTRAMUSCULAR; INTRAVENOUS
Status: DISCONTINUED | OUTPATIENT
Start: 2021-02-17 | End: 2021-02-17 | Stop reason: HOSPADM

## 2021-02-17 RX ORDER — METHYLPREDNISOLONE ACETATE 80 MG/ML
INJECTION, SUSPENSION INTRA-ARTICULAR; INTRALESIONAL; INTRAMUSCULAR; SOFT TISSUE
Status: DISCONTINUED
Start: 2021-02-17 | End: 2021-02-17 | Stop reason: HOSPADM

## 2021-02-17 RX ORDER — SODIUM CHLORIDE, SODIUM LACTATE, POTASSIUM CHLORIDE, CALCIUM CHLORIDE 600; 310; 30; 20 MG/100ML; MG/100ML; MG/100ML; MG/100ML
INJECTION, SOLUTION INTRAVENOUS CONTINUOUS
Status: DISCONTINUED | OUTPATIENT
Start: 2021-02-17 | End: 2021-02-17 | Stop reason: HOSPADM

## 2021-02-17 RX ORDER — MORPHINE SULFATE 2 MG/ML
2 INJECTION, SOLUTION INTRAMUSCULAR; INTRAVENOUS EVERY 5 MIN PRN
Status: DISCONTINUED | OUTPATIENT
Start: 2021-02-17 | End: 2021-02-17 | Stop reason: HOSPADM

## 2021-02-17 RX ORDER — BUPIVACAINE HYDROCHLORIDE 2.5 MG/ML
INJECTION, SOLUTION EPIDURAL; INFILTRATION; INTRACAUDAL
Status: DISCONTINUED
Start: 2021-02-17 | End: 2021-02-17 | Stop reason: HOSPADM

## 2021-02-17 RX ORDER — BUPIVACAINE HYDROCHLORIDE 5 MG/ML
INJECTION, SOLUTION EPIDURAL; INTRACAUDAL
Status: COMPLETED
Start: 2021-02-17 | End: 2021-02-17

## 2021-02-17 RX ORDER — ONDANSETRON 2 MG/ML
INJECTION INTRAMUSCULAR; INTRAVENOUS PRN
Status: DISCONTINUED | OUTPATIENT
Start: 2021-02-17 | End: 2021-02-17 | Stop reason: SDUPTHER

## 2021-02-17 RX ORDER — HYDROCODONE BITARTRATE AND ACETAMINOPHEN 5; 325 MG/1; MG/1
1 TABLET ORAL EVERY 6 HOURS PRN
Qty: 28 TABLET | Refills: 0 | Status: SHIPPED | OUTPATIENT
Start: 2021-02-17 | End: 2021-02-24

## 2021-02-17 RX ADMIN — BUPIVACAINE HYDROCHLORIDE 10 ML: 5 INJECTION, SOLUTION EPIDURAL; INTRACAUDAL; PERINEURAL at 12:10

## 2021-02-17 RX ADMIN — FENTANYL CITRATE 100 MCG: 50 INJECTION, SOLUTION INTRAMUSCULAR; INTRAVENOUS at 12:01

## 2021-02-17 RX ADMIN — MIDAZOLAM HYDROCHLORIDE 2 MG: 1 INJECTION, SOLUTION INTRAMUSCULAR; INTRAVENOUS at 12:00

## 2021-02-17 RX ADMIN — BUPIVACAINE 10 ML: 13.3 INJECTION, SUSPENSION, LIPOSOMAL INFILTRATION at 12:10

## 2021-02-17 RX ADMIN — ONDANSETRON 4 MG: 2 INJECTION INTRAMUSCULAR; INTRAVENOUS at 12:42

## 2021-02-17 RX ADMIN — MIDAZOLAM HYDROCHLORIDE 2 MG: 2 INJECTION, SOLUTION INTRAMUSCULAR; INTRAVENOUS at 12:00

## 2021-02-17 RX ADMIN — MIDAZOLAM HYDROCHLORIDE 2 MG: 1 INJECTION, SOLUTION INTRAMUSCULAR; INTRAVENOUS at 12:35

## 2021-02-17 RX ADMIN — PROPOFOL 150 MG: 10 INJECTION, EMULSION INTRAVENOUS at 12:37

## 2021-02-17 ASSESSMENT — PAIN SCALES - GENERAL
PAINLEVEL_OUTOF10: 0
PAINLEVEL_OUTOF10: 0

## 2021-02-17 ASSESSMENT — PULMONARY FUNCTION TESTS
PIF_VALUE: 3
PIF_VALUE: 2
PIF_VALUE: 0
PIF_VALUE: 13
PIF_VALUE: 3
PIF_VALUE: 0

## 2021-02-17 ASSESSMENT — PAIN - FUNCTIONAL ASSESSMENT: PAIN_FUNCTIONAL_ASSESSMENT: 0-10

## 2021-02-17 ASSESSMENT — PAIN DESCRIPTION - DESCRIPTORS: DESCRIPTORS: ACHING

## 2021-02-17 NOTE — PROGRESS NOTES
CLINICAL PHARMACY NOTE: MEDS TO [address] Select Patient?: Yes  Total # of Prescriptions Filled: 1   The following medications were delivered to the patient:  · Norco 5-325  Total # of Interventions Completed: 0  Time Spent (min): 0    Additional Documentation:

## 2021-02-17 NOTE — ANESTHESIA PRE PROCEDURE
Department of Anesthesiology  Preprocedure Note       Name:  Efra Boyle   Age:  62 y.o.  :  1960                                          MRN:  [id number]         Date:  2021      Surgeon: Anabelle Baker):  Mily Arredondo DO    Procedure: Procedure(s):  SHOULDER MANIPULATION WITH PRE OP INTERSCALENE BLOCK    Medications prior to admission:   Prior to Admission medications    Medication Sig Start Date End Date Taking?  Authorizing Provider   albuterol sulfate HFA (PROVENTIL HFA) 108 (90 Base) MCG/ACT inhaler Inhale 2 puffs into the lungs every 4 hours as needed for Wheezing 21 Yes Katie Celestin MD   cyclobenzaprine (FLEXERIL) 10 MG tablet TAKE ONE TABLET BY MOUTH NIGHTLY AS NEEDED FOR MUSCLE SPASMS 21  Yes Axel Adam MD   SYNJARDY 5-1000 MG TABS TAKE 1 TABLET BY MOUTH 2 TIMES A DAY 21  Yes Katie Celestin MD   ASPIRIN ADULT LOW STRENGTH 81 MG EC tablet TAKE 1 TABLET BY MOUTH ONE TIME A DAY 20  Yes Katie Celestin MD   TRULICITY 1.5 RQ/1.4UO [facility] INJECT THE CONTENTS OF ONE SYRINGE UNDER THE SKIN ONCE WEEKLY 20  Yes Katie Celestin MD   rOPINIRole (REQUIP) 2 MG tablet TAKE TWO TABLETS BY MOUTH NIGHTLY 20  Yes Rea Baires MD   repaglinide (PRANDIN) 1 MG tablet TAKE 1 TABLET BY MOUTH 3 TIMES A DAY BEFORE MEALS 20  Yes Axel ΜΑΡΙ, MD   busPIRone (BUSPAR) 10 MG tablet TAKE 1 TABLET BY MOUTH 3 TIMES A DAY 20  Yes Katie Celestin MD   glipiZIDE (GLUCOTROL) 10 MG tablet TAKE 1 TABLET BY MOUTH 2 TIMES A DAY BEFORE MEALS (REPLACES GLYBURIDE) 20  Yes Katie Celestin MD   rosuvastatin (CRESTOR) 20 MG tablet Take 1 tablet by mouth nightly 20  Yes Lizz Wheeler MD   diclofenac sodium (VOLTAREN) 1 % GEL Apply 2 g topically 2 times daily  Patient taking differently: Apply 2 g topically 2 times daily Prn pain 20  Yes Lizz Wheeler MD lisinopril (PRINIVIL;ZESTRIL) 40 MG tablet TAKE 1 TABLET BY MOUTH ONE TIME A DAY 9/25/20  Yes Marisa Munguia MD   chlorthalidone (HYGROTON) 25 MG tablet TAKE 1 TABLET BY MOUTH ONE TIME A DAY 8/25/20  Yes Noam Ernst MD   omeprazole (PRILOSEC) 20 MG delayed release capsule Take 1 capsule by mouth 2 times daily 5/5/20  Yes Noam Ernst MD   acetaminophen (ACETAMINOPHEN EXTRA STRENGTH) 500 MG tablet Take 1,000 mg by mouth daily as needed for Pain   Yes Historical Provider, MD   repaglinide (PRANDIN) 1 MG tablet TAKE 1 TABLET BY MOUTH 3 TIMES A DAY BEFORE MEALS 12/8/20   MD Haroldo Chavez Lancets 28G MISC Use to test once daily and as needed as directed by provider 11/12/20   Irvin Willard MD   blood glucose test strips (PRODIGY NO CODING BLOOD GLUC) strip Use to test once daily and as needed as directed by provider 11/12/20   Irvin Willard MD   ondansetron (ZOFRAN) 4 MG tablet Take 1 tablet by mouth 3 times daily as needed for Nausea or Vomiting 3/20/20   Noam Ernst MD   bimatoprost (LUMIGAN) 0.01 % SOLN ophthalmic drops Place 1 drop into both eyes nightly 4/26/18   Historical Provider, MD   PRODIGY LANCETS 28G MISC 1 Bottle by Does not apply route three times daily 4/26/19   Noam Ernst MD   Blood Glucose Monitoring Suppl (PRODIGY AUTOCODE BLOOD GLUCOSE) w/Device KIT 1 Device by Does not apply route 3 times daily 4/26/19   Noam Ernst MD       Current medications:    Current Facility-Administered Medications   Medication Dose Route Frequency Provider Last Rate Last Admin    lactated ringers infusion   Intravenous Continuous Amy Keita MD        sodium chloride flush 0.9 % injection 10 mL  10 mL Intravenous 2 times per day Amy Keita MD        sodium chloride flush 0.9 % injection 10 mL  10 mL Intravenous PRN Amy Keita MD        lidocaine PF 1 % injection 1 mL  1 mL Intradermal Once PRN Amy Keita MD  bupivacaine (PF) (MARCAINE) 0.5 % injection             fentaNYL (SUBLIMAZE) 100 MCG/2ML injection             midazolam (VERSED) 2 MG/2ML injection             bupivacaine (PF) (MARCAINE) 0.25 % injection             bupivacaine liposome (EXPAREL) 1.3 % injection             methylPREDNISolone acetate (DEPO-MEDROL) 80 MG/ML injection                Allergies:     Allergies   Allergen Reactions    Codeine Nausea And Vomiting       Problem List:    Patient Active Problem List   Diagnosis Code    Cervical spondylosis M47.812    Type II or unspecified type diabetes mellitus without mention of complication, not stated as uncontrolled E11.9    Hypertension I10    Abnormal auditory perception H93.299    Eustachian tube dysfunction H69.80    Chronic serous otitis media H65.20    Nasal polyps J33.9    Nasal congestion R09.81    Osteoarthritis of left knee M17.12    Osteoarthritis of right knee M17.11    DIEGO (nonalcoholic steatohepatitis) K75.81    Vitamin D deficiency E55.9    Iron deficiency anemia D50.9    Dyslipidemia E78.5    Diabetes mellitus type 2, uncontrolled (HCC) E11.65    Left hip pain M25.552    Trochanteric bursitis M70.60    Fatigue R53.83    Daytime somnolence R40.0    Snoring R06.83    Chronic left shoulder pain M25.512, G89.29    Restless legs syndrome G25.81    Generalized anxiety disorder F41.1    Primary osteoarthritis, left shoulder M19.012    Tendinopathy of left shoulder M67.912       Past Medical History:        Diagnosis Date    Cervical spondylosis 4/2009    c-4 c-5, c-5 c-6, c-6 c-7    Generalized anxiety disorder 12/8/2020    Hyperlipidemia     Hypertension     DIEGO (nonalcoholic steatohepatitis) 10/12/2014    Obesity     Osteoarthritis of left knee 8/19/2014    Restless legs syndrome     Type II or unspecified type diabetes mellitus without mention of complication, not stated as uncontrolled        Past Surgical History: Procedure Laterality Date     SECTION      [facility]    HYSTERECTOMY, TOTAL ABDOMINAL  2008    Polymenorrhagia    UPPER GASTROINTESTINAL ENDOSCOPY  2012    [facility]       Social History:    Social History     Tobacco Use    Smoking status: Never Smoker    Smokeless tobacco: Never Used   Substance Use Topics    Alcohol use: Yes     Alcohol/week: 0.0 standard drinks     Comment: rarely/ 4 times a year                                Counseling given: Not Answered      Vital Signs (Current):   Vitals:    21 1049   BP: 128/73   Pulse: 92   Resp: 16   Temp: 97.5 °F (36.4 °C)   TempSrc: Temporal   SpO2: 100%   Weight: 199 lb 9.6 oz (90.5 kg)   Height: 5' 3\" (1.6 m)                                              BP Readings from Last 3 Encounters:   21 128/73   21 116/71   21 121/77       NPO Status: Time of last liquid consumption:                         Time of last solid consumption:                         Date of last liquid consumption: 21                        Date of last solid food consumption: 21    BMI:   Wt Readings from Last 3 Encounters:   21 199 lb 9.6 oz (90.5 kg)   21 199 lb (90.3 kg)   21 200 lb (90.7 kg)     Body mass index is 35.36 kg/m².     CBC:   Lab Results   Component Value Date    WBC 14.4 2021    RBC 4.33 2021    HGB 11.9 2021    HCT 40.0 2021    MCV 92.4 2021    RDW 12.8 2021     2021       CMP:   Lab Results   Component Value Date     2021    K 4.8 2021     2021    CO2 22 2021    BUN 25 2021    CREATININE 1.16 2021    GFRAA 58 2021    LABGLOM 48 2021    GLUCOSE 90 2021    GLUCOSE 137 2012    PROT 7.1 2021    CALCIUM 9.0 2021    BILITOT 0.21 2021    ALKPHOS 96 2021    AST 18 2021    ALT 20 2021       POC Tests:   Recent Labs 02/17/21  1104   POCGLU 113*       Coags:   Lab Results   Component Value Date    PROTIME 10.0 07/23/2013    INR 1.0 07/23/2013    APTT 25.7 07/23/2013       HCG (If Applicable): No results found for: PREGTESTUR, PREGSERUM, HCG, HCGQUANT     ABGs: No results found for: PHART, PO2ART, UAT2KGB, JMN2MPN, BEART, R4CCRDYN     Type & Screen (If Applicable):  No results found for: LABABO, LABRH    Drug/Infectious Status (If Applicable):  Lab Results   Component Value Date    HEPCAB NONREACTIVE 01/15/2014       COVID-19 Screening (If Applicable):   Lab Results   Component Value Date    COVID19 Not Detected 02/13/2021    COVID19 Not Detected 12/16/2020         Anesthesia Evaluation  Patient summary reviewed and Nursing notes reviewed  Airway: Mallampati: II  TM distance: >3 FB   Neck ROM: full  Mouth opening: > = 3 FB Dental: normal exam         Pulmonary:Negative Pulmonary ROS and normal exam                               Cardiovascular:    (+) hypertension:,       ECG reviewed                     ROS comment: -EKG - SR @ 75     Neuro/Psych:   (+) TIA,              ROS comment: -TIA - 10 YEARS AGO  -DDD - CERVICAL, POOR NECK MOBILITY GI/Hepatic/Renal:   (+) GERD:, liver disease:, morbid obesity         ROS comment: -NON-ALCOHOLIC STEATOHEPATITIS. Endo/Other:    (+) Diabetes, : arthritis:., .                  ROS comment: -NPO AFTER MIDNIGHT  -ALLERGIES - CODEINE Abdominal:           Vascular: negative vascular ROS. Anesthesia Plan      MAC and regional     ASA 3     (INTERSCALENE BLOCK; IF UNABLE TO TOLERATE MAC, THEN PLACE LMA)  Induction: intravenous. MIPS: Postoperative opioids intended and Prophylactic antiemetics administered. Anesthetic plan and risks discussed with patient. Plan discussed with CRNA.     Attending anesthesiologist reviewed and agrees with Pre Eval content              Bolivar Farah MD   2/17/2021

## 2021-02-17 NOTE — ANESTHESIA PROCEDURE NOTES
Peripheral Block    Patient location during procedure: pre-op  Start time: 2/17/2021 12:07 PM  End time: 2/17/2021 12:10 PM  Staffing  Performed: anesthesiologist   Anesthesiologist: Zbigniew Garrido MD  Preanesthetic Checklist  Completed: patient identified, IV checked, site marked, risks and benefits discussed, surgical consent, monitors and equipment checked, pre-op evaluation, timeout performed, anesthesia consent given, oxygen available and patient being monitored  Peripheral Block  Patient position: sitting  Prep: ChloraPrep  Patient monitoring: cardiac monitor, continuous pulse ox and frequent blood pressure checks  Block type: Brachial plexus  Laterality: left  Injection technique: single-shot  Guidance: ultrasound guided  Interscalene  Provider prep: mask and sterile gloves  Needle  Needle type: combined needle/nerve stimulator   Needle gauge: 22 G  Needle length: 2 IN.   Needle localization: anatomical landmarks, nerve stimulator and ultrasound guidance  Assessment  Injection assessment: negative aspiration for heme, no paresthesia on injection and local visualized surrounding nerve on ultrasound  Paresthesia pain: none  Slow fractionated injection: yes  Hemodynamics: stable  Additional Notes  (+) LEFT DELTOID TWITCH FROM 1.5MA DOWN TO 0.7MA  Medications Administered  Bupivacaine liposome (EXPAREL) injection 1.3%, 10 mL  bupivacaine (MARCAINE) PF injection 0.5%, 10 mL  Reason for block: post-op pain management and at surgeon's request

## 2021-02-17 NOTE — ANESTHESIA POSTPROCEDURE EVALUATION
Department of Anesthesiology  Postprocedure Note    Patient: Toña Potter  MRN: [id number]  YOB: 1960  Date of evaluation: 2/17/2021  Time:  1:14 PM     Procedure Summary     Date: 02/17/21 Room / Location: [address] [place] [address] [facility]    Anesthesia Start: 2612 Anesthesia Stop: 1250    Procedure: SHOULDER MANIPULATION WITH PRE OP INTERSCALENE BLOCK and intraop injection (Left Shoulder) Diagnosis: (FROZEN SHOULDER LEFT)    Surgeons: Sandra Larose DO Responsible Provider: Daniel Schmidt MD    Anesthesia Type: MAC, regional ASA Status: 3          Anesthesia Type: MAC, regional    Elise Phase I: Elise Score: 3    Elise Phase II:      Last vitals: Reviewed and per EMR flowsheets.        Anesthesia Post Evaluation    Patient location during evaluation: PACU  Patient participation: complete - patient participated  Level of consciousness: awake and alert  Airway patency: patent  Nausea & Vomiting: no nausea and no vomiting  Complications: no  Cardiovascular status: hemodynamically stable  Respiratory status: face mask and spontaneous ventilation  Hydration status: euvolemic

## 2021-02-17 NOTE — H&P
Orthopedic Preoperative H&P Note      CHIEF COMPLAINT:  Left shoulder adhesive capsulitis    HISTORY OF PRESENT ILLNESS:      The patient is a 62 y.o. female with left shoulder adhesive capsulitis here for left shoulder manipulation under anesthesia. NPO since midnight. Past Medical History:    Past Medical History:   Diagnosis Date    Cervical spondylosis 2009    c-4 c-5, c-5 c-6, c-6 c-7    Generalized anxiety disorder 2020    Hyperlipidemia     Hypertension     DIEGO (nonalcoholic steatohepatitis) 10/12/2014    Obesity     Osteoarthritis of left knee 2014    Restless legs syndrome     Type II or unspecified type diabetes mellitus without mention of complication, not stated as uncontrolled      Past Surgical History:    Past Surgical History:   Procedure Laterality Date     SECTION      [facility]  2012    [facility]    HYSTERECTOMY, TOTAL ABDOMINAL  2008    Polymenorrhagia    UPPER GASTROINTESTINAL ENDOSCOPY  2012    [facility]     Medications Prior to Admission:   Prior to Admission medications    Medication Sig Start Date End Date Taking?  Authorizing Provider   albuterol sulfate HFA (PROVENTIL HFA) 108 (90 Base) MCG/ACT inhaler Inhale 2 puffs into the lungs every 4 hours as needed for Wheezing 21 Yes Sultana Carrera MD   cyclobenzaprine (FLEXERIL) 10 MG tablet TAKE ONE TABLET BY MOUTH NIGHTLY AS NEEDED FOR MUSCLE SPASMS 21  Yes Axel Adam MD   SYNJARDY 5-1000 MG TABS TAKE 1 TABLET BY MOUTH 2 TIMES A DAY 21  Yes Sultana Carrera MD   ASPIRIN ADULT LOW STRENGTH 81 MG EC tablet TAKE 1 TABLET BY MOUTH ONE TIME A DAY 20  Yes Sultana Carrera MD   TRULICITY 1.5 LD/8.4DE [facility] INJECT THE CONTENTS OF ONE SYRINGE UNDER THE SKIN ONCE WEEKLY 20  Yes Sultana Carrera MD   rOPINIRole (REQUIP) 2 MG tablet TAKE TWO TABLETS BY MOUTH NIGHTLY 20  Yes Darnell Meigs, MD repaglinide (PRANDIN) 1 MG tablet TAKE 1 TABLET BY MOUTH 3 TIMES A DAY BEFORE MEALS 12/16/20  Yes Aisha Buckner MD   busPIRone (BUSPAR) 10 MG tablet TAKE 1 TABLET BY MOUTH 3 TIMES A DAY 12/8/20  Yes Anne Marie Jameson MD   glipiZIDE (GLUCOTROL) 10 MG tablet TAKE 1 TABLET BY MOUTH 2 TIMES A DAY BEFORE MEALS (REPLACES GLYBURIDE) 12/2/20  Yes Anne Marie Jameson MD   rosuvastatin (CRESTOR) 20 MG tablet Take 1 tablet by mouth nightly 11/12/20  Yes Polina Corbett MD   diclofenac sodium (VOLTAREN) 1 % GEL Apply 2 g topically 2 times daily  Patient taking differently: Apply 2 g topically 2 times daily Prn pain 11/12/20  Yes Polina Corbett MD   lisinopril (PRINIVIL;ZESTRIL) 40 MG tablet TAKE 1 TABLET BY MOUTH ONE TIME A DAY 9/25/20  Yes Corina Andrade MD   chlorthalidone (HYGROTON) 25 MG tablet TAKE 1 TABLET BY MOUTH ONE TIME A DAY 8/25/20  Yes Anne Marie Jameson MD   omeprazole (PRILOSEC) 20 MG delayed release capsule Take 1 capsule by mouth 2 times daily 5/5/20  Yes Anne Marie Jameson MD   acetaminophen (ACETAMINOPHEN EXTRA STRENGTH) 500 MG tablet Take 1,000 mg by mouth daily as needed for Pain   Yes Historical Provider, MD   repaglinide (PRANDIN) 1 MG tablet TAKE 1 TABLET BY MOUTH 3 TIMES A DAY BEFORE MEALS 12/8/20   MD Haroldo Smith 28G MISC Use to test once daily and as needed as directed by provider 11/12/20   Polina Corbett MD   blood glucose test strips (PRODIGY NO CODING BLOOD GLUC) strip Use to test once daily and as needed as directed by provider 11/12/20   Polina Corbett MD   ondansetron (ZOFRAN) 4 MG tablet Take 1 tablet by mouth 3 times daily as needed for Nausea or Vomiting 3/20/20   Anne Marie Jameson MD   bimatoprost (LUMIGAN) 0.01 % SOLN ophthalmic drops Place 1 drop into both eyes nightly 4/26/18   Historical Provider, MD   PRODIGY LANCETS 28G MISC 1 Bottle by Does not apply route three times daily 4/26/19   Anne Marie Jameson MD Blood Glucose Monitoring Suppl (PRODIGY AUTOCODE BLOOD GLUCOSE) w/Device KIT 1 Device by Does not apply route 3 times daily 4/26/19   Corbin Salmon MD     Allergies:    Codeine  Social History:   Social History     Socioeconomic History    Marital status:      Spouse name: Not on file    Number of children: Not on file    Years of education: Not on file    Highest education level: Not on file   Occupational History    Not on file   Social Needs    Financial resource strain: Not on file    Food insecurity     Worry: Not on file     Inability: Not on file    Transportation needs     Medical: Not on file     Non-medical: Not on file   Tobacco Use    Smoking status: Never Smoker    Smokeless tobacco: Never Used   Substance and Sexual Activity    Alcohol use: Yes     Alcohol/week: 0.0 standard drinks     Comment: rarely/ 4 times a year    Drug use: No    Sexual activity: Yes   Lifestyle    Physical activity     Days per week: Not on file     Minutes per session: Not on file    Stress: Not on file   Relationships    Social connections     Talks on phone: Not on file     Gets together: Not on file     Attends Hindu service: Not on file     Active member of club or organization: Not on file     Attends meetings of clubs or organizations: Not on file     Relationship status: Not on file    Intimate partner violence     Fear of current or ex partner: Not on file     Emotionally abused: Not on file     Physically abused: Not on file     Forced sexual activity: Not on file   Other Topics Concern    Not on file   Social History Narrative    Not on file     Family History:  Family History   Problem Relation Age of Onset    Heart Attack Mother     Diabetes Mother     Diabetes Father     Heart Attack Father        REVIEW OF SYSTEMS:  No changes from baseline regarding Fever, Chills, Chest pain, SOB, Respiratory, Cardiovascular, GI, , Dermatologic, Endocrine.      PHYSICAL EXAM: Blood pressure 122/79, pulse 87, temperature 97.5 °F (36.4 °C), temperature source Temporal, resp. rate 11, height 5' 3\" (1.6 m), weight 199 lb 9.6 oz (90.5 kg), SpO2 100 %, not currently breastfeeding. Gen: alert and oriented  Resp: symmetric chest excursion, non labored breathing  Heart: RRR     Left shoulder: Skin intact. No signs of infection. Active range of motion of the left shoulder is: ff=90 left shoulder, AB=70 left shoulder. PROM with scapular stabilization left shoulder F=50, AB=40, ER=10, IR=40. Rotator cuff strength appears to be mostly preserved to the left shoulder. No gross instability of the left shoulder is appreciated. Patient has full range of motion cervical spine and left elbow. Motor, sensory, vascular examination of the left upper extremity is grossly intact without focal deficits. LABS:  No results for input(s): WBC, HGB, HCT, PLT, INR, PTT, NA, K, BUN, CREATININE, GLUCOSE in the last 72 hours. Invalid input(s): PT     A/P: 62 y.o. female with left shoulder arthrofibrosis.     - OR for left shoulder manipulation under anesthesia  - NPO since midnight  - site marked  - consent in chart    Adrian Kang DO  PGY-2 Orthopedic Surgery  12:16 PM 2/17/2021

## 2021-02-17 NOTE — PROGRESS NOTES
Physical Therapy    Facility/Department: [facility]  Initial Assessment    NAME: Gage Johnson  : 1960  MRN: [id number]    Date of Service: 2021    Discharge Recommendations:  Patient would benefit from continued therapy after discharge   PT Equipment Recommendations  Equipment Needed: No    Assessment   Body structures, Functions, Activity limitations: Decreased ROM; Decreased strength  Assessment: Pt with good tolerance to PROM this afternoon. Pt with improvement in ROM after manipulation this morning- significant improvement compared to last noted measurement at [address] [place] [address] PT. Pt will continue her therapy at [address] [place] [address] PT for continued intense ROM/stretching following today's manipulation. Prognosis: Good  Decision Making: Low Complexity  PT Education: Home Exercise Program  REQUIRES PT FOLLOW UP: Yes  Activity Tolerance  Activity Tolerance: Patient Tolerated treatment well       Patient Diagnosis(es): The encounter diagnosis was Adhesive capsulitis of left shoulder. has a past medical history of Cervical spondylosis, Generalized anxiety disorder, Hyperlipidemia, Hypertension, DIEGO (nonalcoholic steatohepatitis), Obesity, Osteoarthritis of left knee, Restless legs syndrome, and Type II or unspecified type diabetes mellitus without mention of complication, not stated as uncontrolled. has a past surgical history that includes Upper gastrointestinal endoscopy (2012); Colonoscopy (2012);  section; Hysterectomy, total abdominal (); and shoulder surgery (Left, 2021).     Restrictions  Restrictions/Precautions  Required Braces or Orthoses?: No  Position Activity Restriction  Other position/activity restrictions: left interscalene block; s/p left shoudler manipulation secondary to adhesive capsulitis with Dr Julia Adair  Response To Previous Treatment: Not applicable  Family / Caregiver Present: No  Follows Commands: Within Functional Limits  Subjective  Subjective: Pt supine on PACU cot and agreeable to therapy this afternoon. Pt denies any pain and states overall she feels well. RN agreeable to therapy. Pain Screening  Patient Currently in Pain: Denies  Vital Signs  Patient Currently in Pain: Denies       Social/Functional History  Social/Functional History  Lives With: Family(Lives with her spouse and two daughters (ages 24 and 25))  Type of Home: Apartment  Home Layout: One level(2nd floor apartment)  Home Access: Stairs to enter with rails  Entrance Stairs - Number of Steps: 21  Entrance Stairs - Rails: Right  Bathroom Shower/Tub: Tub/Shower unit  Bathroom Toilet: Standard  Bathroom Equipment: (None)  Home Equipment: (No DME)  ADL Assistance: Independent(Reports difficulty at times but able to complete)  Homemaking Assistance: Independent  Homemaking Responsibilities: Yes  Meal Prep Responsibility: Primary  Laundry Responsibility: Primary  Cleaning Responsibility: Primary  Shopping Responsibility: Primary  Other (Comment):  Independent but states her family is prepared to perform if necessary  Ambulation Assistance: Independent  Transfer Assistance: Independent  Active : Yes  Occupation: Full time employment  Type of occupation: Works full time from home for [employer] ([employer]  Additional Comments: Pt reports she has an appointment scheduled for OP PT at [facility]  Cognition   Cognition  Overall Cognitive Status: WFL    Objective          AROM RUE (degrees)  RUE AROM : WFL  PROM LUE (degrees)  LUE PROM: Exceptions  L Shoulder Flex  0-170: 173  L Shoulder Ext  0-45: 30  L Shoulder ABduction 0-140: 177  L Shoulder Int Rotation  0-70: 70  L Shoulder Ext Rotation  0-90: 46  L Elbow Flex/Ext 0-145: WFL  L Wrist Flex 0-80: WFL  L Wrist Ext 0-70: WFL  AROM LUE (degrees)  LUE General AROM: Not active movement of shoulder or elbow at this time (interscalene block was performed)- noted left  strength with ability to hold therapists hand while therapist lifted UE into shoulder flexion (therefore was provided with AAROM cane versus HHA shoulder ROM exercises to perform at home upon discharge from PACU)  Strength LUE  Comment: No active movement to wrist/elbow/shoulder- pt with interscalene block performed prior to manipulation     Sensation  Overall Sensation Status: Impaired  Additional Comments: Impaired to light touch to left UE following interscalene block performed prior to manipulation                 Exercises  Shoulder Passive Range of Motion: Shoulder flexion, extension, abduction, adduction, internal rotation, external rotation x12 reps x2 sets in each direction as specified with prolonged holds at end range to promote ROM/stretching  Comments: Provided pt with written HEP for cane AAROM for supine shoulder flexion and abduction along with standing internal/external rotation and extension to perform 3x per day until start of outpatient physical therapy tomorrow. Pt was able to demonstrate ability to  writers hand with her left hand and writer was able to [facility] lift shoulder into planes therefore should be able to perform cane exercises using RUE to guide at home. Educated pt on if unable to perform this way to use RUE to lift LUE into the specified movements on HEP. Instructed pt on HEP and pt verbalized understanding. Plan   Plan  Discharge from acute physical therapy- goals of PACU therapy order have been met at this time.    Current Treatment Recommendations: ROM, Strengthening, Modalities, Patient/Caregiver Education & Training  Safety Devices  Type of devices: Nurse notified, Left in bed  Restraints  Initially in place: No      AM-PAC Score     AM-PAC Inpatient Mobility without Stair Climbing Raw Score : 17 (02/17/21 1456)  AM-PAC Inpatient without Stair Climbing T-Scale Score : 48.47 (02/17/21 1456)  Mobility Inpatient CMS 0-100% Score: 32.72 (02/17/21 1456)  Mobility Inpatient without Stair CMS G-Code Modifier : Florestine Bottom (02/17/21 1456)       Goals    Discharge from acute physical therapy- goals of PACU therapy order have been met at this time. Goals for this therapy order: PROM with measurement/evaluation and education to pt on ROM exercises to be performed prior to arrival at OP PT in the morning. Pt tolerated PROM well, initial measurements obtained and ROM exercises provided in written handout and educated pt on with good understanding. Discharge from this physical therapy order (PACU PT order) with follow up at her outpatient therapy appointment tomorrow morning at [address] PT.      Therapy Time   Individual Concurrent Group Co-treatment   Time In 1326         Time Out 1411         Minutes 45         Timed Code Treatment Minutes: [address] [facility] Codey Moreno, DYANA

## 2021-02-17 NOTE — BRIEF OP NOTE
Brief Postoperative Note      Patient: Lizbeth Soria  YOB: 1960  MRN: [id number]    Date of Procedure: 2/17/2021    Pre-Op Diagnosis: Adhesive capsulitis, left shoulder    Post-Op Diagnosis: Adhesive capsulitis, left shoulder       Procedure(s):  SHOULDER MANIPULATION WITH PRE OP INTERSCALENE BLOCK    Surgeon(s):  Jamie Carr DO    Assistant:  Resident: Thom Rao DO    Anesthesia: Monitor Anesthesia Care    Fluids: 200 cc crystalloid    Estimated Blood Loss (mL): None    Complications: None    Specimens:   * No specimens in log *    Implants:  * No implants in log *      Drains: * No LDAs found *    Findings:   Pre-op PROM: ab 40 --> 160; ff 50 --> 165; ER 10 --> 90; IR 40 --> 95    See op note.     Electronically signed by Thom Rao DO on 2/17/2021 at 12:49 PM

## 2021-02-18 ENCOUNTER — HOSPITAL ENCOUNTER (OUTPATIENT)
Dept: PHYSICAL THERAPY | Age: 61
Setting detail: THERAPIES SERIES
Discharge: HOME | End: 2021-02-18
Payer: COMMERCIAL

## 2021-02-18 ENCOUNTER — TELEPHONE (OUTPATIENT)
Dept: FAMILY MEDICINE CLINIC | Age: 61
End: 2021-02-18

## 2021-02-18 ENCOUNTER — CLINICAL DOCUMENTATION (OUTPATIENT)
Dept: FAMILY MEDICINE CLINIC | Age: 61
End: 2021-02-18

## 2021-02-18 PROCEDURE — 97110 THERAPEUTIC EXERCISES: CPT

## 2021-02-18 PROCEDURE — 97161 PT EVAL LOW COMPLEX 20 MIN: CPT

## 2021-02-18 NOTE — CONSULTS
[address]   Outpatient Physical Therapy  Physical Therapy Upper Extremity Evaluation    Date:  2021  Patient: Melo Joya  : 1960  MRN: [id number]  Physician: Carma Frankel, DO  Insurance: [employer]. Visits [facility]  Medical Diagnosis: M75.02 (ICD-10-CM) - Adhesive capsulitis of left shoulder   Rehab Codes: M75.02, R53.1, M25.561  Onset Date: 2020    Next 's appt: 3/3/21    Subjective:   CC: L shoulder pain & stiffness  HPI: ~7 month hx of hx of L shoulder pain and stiffness with no specific mechanism of injury, just gradual worsening pain and ROM loss. Attended 11 PT visits at this location through 2020 with minimal improvement. Follow up MRI imaging at that time revealed low grade partial thickness tears of L supraspinatus and infraspinatus, with adhesive capsulitis and general degenerative changes. Was recommended at that time for a  CRISTOBAL which was successfully completed yesterday via Dr. Oumou Ponce. Has now been referred to for follow up outpt PT with emphasis on high frequency care for first 2 weeks. PMHx: [] Unremarkable [x] Diabetes [x] HTN  [] Pacemaker   [] MI/Heart Problems [] Cancer [x] Arthritis  [x] Other: cervical spondylosis, anxiety              [x] Refer to full medical chart  In EPIC     Comorbidities:   [x] Obesity [] Dialysis  [] Other:   [] Asthma/COPD [] Dementia [] Other:   [] Stroke [] Sleep apnea [] Other:   [] Vascular disease [] Rheumatic disease [] Other:     Prior Imaging: MRI from 2020 revealed low-grade partial thickness L supraspinatus and infraspinatus, mild subscapularis tendinopathy, mild diffuse labral degeneration, thickening of the L inferior glenohumeral ligament consistent with adhesive capsulitis, mild to moderate chrondomalacia and mild degenerative changes at L ACJ. Previous Treatment: 11 PT visits completed at this facility (primarily aquatics) through December 2020 for L shoulder pain and stiffness with limited results. Ended up requiring a CRISTOBAL which was performed successfully on 2/17/21. 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  Minimal pain levels but is not currently able to actively move her L shoulder  Alleviating factors: Rest      Functional Status Previous level of function Current level of function Comments   Bathing  [x] Independent  [] Deficit [] Independent  [x] Deficit Unable to use L arm   Dress/grooming [x] Independent  [] Deficit [] Independent  [x] Deficit Unable to use L arm   Driving [x] Independent  [] Deficit [] Independent  [x] Deficit Unable to use L arm   Housekeeping/Meal Preparation [x] Independent  [] Deficit [] Independent  [x] Deficit Unable to use L arm   Lifting/Carrying [x] Independent  [] Deficit [] Independent  [x] Deficit Unable to use L arm   Reaching [x] Independent  [] Deficit [] Independent  [x] Deficit Unable to use L arm   Grasping [x] Independent  [] Deficit [] Independent  [x] Deficit Limited/difficult   Writing/Typing [x] Independent  [] Deficit [] Independent  [x] Deficit Unable to use L arm   Other:  [] Independent  [] Deficit [] Independent  [] Deficit Patient Goals/Rehab Expectations: To be able to move normally        Objective:    Observations: Limited to no active movement in L UE. Arm is hanging limply by side. Cervical Clearing:  [x] Not tested            [] No Deficit              [] Deficit:      Sensation:  [] No Deficit         [x] Deficit: Minimal ability to recognize light touch sensation through entire L arm/wrist/hand    Fall risk:  [x] No            [] Yes:        Range of Motion  Left Range of Motion  Right Strength  Left Strength  Right   Shoulder Flexion 0 AROM  150 deg AA/PROM 155 1/5 4/5   Shoulder Abduction 0 AROM  150 deg AA/PROM 155 1/5 4/5   Shoulder Extension 0 AROM  AA/PROM NT 60     Shoulder ER 0 AROM  28 AA/PROM 60 1/5 4/5   Shoulder IR 0 AROM  75 AA/PROM 75 1/5 4+/5   Elbow Flexion 90 AROM  120 AA/PROM WNL 2-/5 4+/5   Elbow Extension 0 0 2-/5 4/5   Pronation Limited active range  PROM WNL      Supination Limited active range  PROM WNL      Wrist Flexion Limited active range  PROM WNL WNL 2-/5 4+/5   Wrist Extension Limited active range  PROM WNL WNL 2-/5 4+/5   Radial Deviation Limited active range  PROM WNL  2-/5 4+/5   Ulnar Deviation Limited active range  PROM WNL  2-/5 4+/5      3/5 5/5   Apley- Cross Body Reach Unable Posterior shoulder     Apley- Gross ER Unable T2     Apley- Gross IR Unable T9     *Limited to no active movement in L UE which pt attributes to minimal to no sensation    Joint Mobility: Accessory GHJ mobility WNL    Palpation: Increased tone and TTP at L anterior deltoid, distal lat, subscap and infraspinatus    Gait: Independent [x]           Modified Independent []            Not independent []  Comments:       Assessment:  Problems:    [x] ? Pain:  [x] ? ROM:  [x] ? Strength:  [x] ?  Function:  [] Other: Pt is a 61 y/o F presenting to PT now 1 day s/p L shoulder CRISTOBAL performed via Dr. Chceo East s/p chronic adhesive capsulitis with failed conservative mgmt. Pt presents today with minimal pain levels and good initial passive mobility in L shoulder, but with near absent sensation and poor active movement in L UE, likely due to residual effects from nerve block. Pt is visibly concerned with lack of sensation and active movement today, but is currently less than 24 hours post-op and explained to pt that current sx are likely a result of lasting effects from her procedural nerve block. Educated pt that sensation and movement should improve through the course of the rest of today/tonight, and initiated HEP emphasizing gentle AAROM of shoulder, elbow and wrist/hand. Sx will be assessed next visit tomorrow, and if not improved, may require medical follow up. Otherwise, good tolerance and initial range with passive/active assistive mobility of L shoulder today, showing favorable prognosis. Skilled PT intervention is required to restore full functional use and strength of L UE to PLOF. STG: (to be met in 5 treatments)  1. Pt will self report worst pain no greater than 2/10 in order to better tolerate ADLs with minimal dysfunction  2. Pt will improve PROM of L shoulder to full symmetry (155 deg elevation, 60 deg extension and ER) in order to demonstrate successful CRISTOBAL and full available mobility in shoulder  LTG: (to be met in 10 treatments)  1. Pt will demonstrate improved L UE strength to 4/5 or greater in order to demonstrate improved stability/strength necessary for unrestricted ADLs/work activities  2. Pt will improve AROM of L UE to full symmetry (150 deg elevation, 60 deg extension and ER, 75 deg IR, elbow flexion 120 deg) for full unrestricted reach in all planes  3.  Pt will demonstrate symmetrical Apley scratch test (cross body to posterior shoulder, ER to T2, IR to T9) for unrestricted dressing at PLOF 4. Pt will decrease SPADI to 10% impaired or less in order to demonstrate improved functional tolerances at PLOF with minimal restriction/dysfunction  5. Pt will demonstrate independence with a long term HEP for continued progress/maintenance after completion of PT      Functional Assessment Used: SPADI   Current Status Score: 96% impaired  Goal Status Score: 10% impaired    Evaluation Complexity:  History (Personal factors, comorbidities) [] 0 [x] 1-2 [] 3+   Exam (limitations, restrictions) [x] 1-2 [] 3 [] 4+   Clinical presentation (progression) [x] Stable [] Evolving  [] Unstable   Decision Making [x] Low [] Moderate [] High    [x] Low Complexity [] Moderate Complexity [] High Complexity     Rehab Potential:  [x] Good  [] Fair  [] Poor   Suggested Professional Referral:  [x] No  [] Yes:  Barriers to Goal Achievement[de-identified]  [x] No  [] Yes:  Domestic Concerns:  [x] No  [] Yes:    Pt. Education:  [x] Plans/Goals, Risks/Benefits discussed  [x] Home exercise program  Method of Education: [x] Verbal  [x] Demo  [x] Written  Comprehension of Education:  [x] Verbalizes understanding. [x] Demonstrates understanding. [] Needs Review. [] Demonstrates/verbalizes understanding of HEP/Ed previously given.     Treatment Plan:  [x] Therapeutic Exercise   03899  [] Iontophoresis: 4 mg/mL Dexamethasone Sodium Phosphate  mAmin  44795   [x] Therapeutic Activity  73493 [] Vasopneumatic cold with compression  48737    [] Gait Training   66268 [] Ultrasound   40675   [x] Neuromuscular Re-education  44767 [] Electrical Stimulation Unattended  00238   [x] Manual Therapy  19496 [] Electrical Stimulation Attended  32398   [x] Instruction in HEP  [] Lumbar/Cervical Traction  45316   [] Aquatic Therapy   23790 [] Cold/hotpack    [] Massage   10083      [] Dry Needling, 1 or 2 muscles  93304   [] Biofeedback, first 15 minutes   02155  [] Biofeedback, additional 15 minutes   12474 [] Dry Needling, 3 or more muscles  02176 Frequency: 5x/week for 10 visits. Additional skilled PT needs will be re-evaluated after initial 2 weeks, with appropriate POC adjustment as needed      Todays Treatment:  Modalities:   Precautions: None  Exercises:  Exercise Reps/ Time Weight/ Level Comments   Table slides 10 reps  AAROM on L; Initial HEP   Elbow and wrist AAROM 3'  Initial HEP   Gross grasp 2'  Closed fist to open hand; Initial HEP               Other:     Specific Instructions for next treatment: Emphasis on ROM restoration followed by strengthening & stabilization as tolerated. Assess sensory deficits and active movement capabilities to determine need for sooner physician follow up. Treatment Charges: Mins Units   [x] Evaluation       [x]  Low       []  Moderate       []  High 47 1   []  Modalities     [x]  Ther Exercise 8 1   []  Manual Therapy     []  Ther Activities     []  Aquatics     []  Neuromuscular     []  Gait Training     []  Dry Needling           1-2 muscles     []  Dry Needling           3 or more muscles     [] Vasocompression     []  Other       TOTAL TREATMENT TIME: 55    Time in: 9:45am    Time Out: 10:40am    Electronically signed by: Moraima Cobos PT        Physician Signature:________________________________Date:__________________  By signing above or cosigning this note, I have reviewed this plan of care and certify a need for medically necessary rehabilitation services.      *PLEASE SIGN ABOVE AND FAX BACK ALL PAGES*

## 2021-02-19 ENCOUNTER — HOSPITAL ENCOUNTER (OUTPATIENT)
Dept: PHYSICAL THERAPY | Age: 61
Setting detail: THERAPIES SERIES
Discharge: HOME | End: 2021-02-19
Payer: COMMERCIAL

## 2021-02-19 PROCEDURE — 97110 THERAPEUTIC EXERCISES: CPT

## 2021-02-19 PROCEDURE — 97140 MANUAL THERAPY 1/> REGIONS: CPT

## 2021-02-19 RX ORDER — CYCLOBENZAPRINE HCL 10 MG
TABLET ORAL
Qty: 30 TABLET | Refills: 0 | Status: SHIPPED | OUTPATIENT
Start: 2021-02-19 | End: 2021-03-08

## 2021-02-19 NOTE — OP NOTE
[address] [facility]                                OPERATIVE REPORT    PATIENT NAME: Manjit CARRASQUILLO               :        1960  MED REC NO:   [id number]                             ROOM:  ACCOUNT NO:   [de-identified]                           ADMIT DATE: 2021  PROVIDER:     Adelina Triana DO    DATE OF PROCEDURE:  2021    PREOPERATIVE DIAGNOSIS:  Left shoulder adhesive capsulitis. POSTOPERATIVE DIAGNOSIS:  Left shoulder adhesive capsulitis. PROCEDURE PERFORMED:  Left shoulder manipulation under anesthesia with  interscalene block. SURGEON:  Zakiya Moe DO    ASSISTANT:  So Chang DO    ANESTHESIA TYPE:  Monitored anesthesia care. FLUIDS:  200 mL of crystalloid. ESTIMATED BLOOD LOSS:  None. COMPLICATIONS:  None. SPECIMENS: None. FINDINGS:  Preoperative range of motion:  40 degrees abduction, forward  flexion 50 degrees, external rotation 10 degrees, internal rotation 40  degrees. Postoperative passive range of motion:  Abduction 160 degrees,  forward flexion 165 degrees, external rotation 90 degrees, internal  rotation 95 degrees. INDICATIONS FOR PROCEDURE:  This is a 64-year-old female who presented  to the office with left shoulder decreased range of motion in both  active range of motion and passive range of motion. She has MRI  findings consistent with mild intra-substance rotator cuff tendinopathy  and tearing; however, most of her issues are with range of motion and an  MRI demonstrated tightening of the inferior glenohumeral ligament, which  was indicative of adhesive capsulitis. We discussed surgical and  non-surgical intervention at this time and the patient wished to proceed  with manipulation of the left shoulder under anesthesia.     DETAILED DESCRIPTION OF PROCEDURE:  The patient was met in the preoperative area, where written consent was obtained for the procedure  for left shoulder manipulation under anesthesia. The patient was marked  for the indicated procedure. Anesthesia did go ahead with an  interscalene block on the left side. We discussed the risks, benefits,  and alternatives to the procedure which included, but were not limited  to, bleeding, blood clots, infection, continued stiffness, chronic pain,  risks of anesthesia, loss of limb, loss of life, intraoperative  fracture. The patient understood these risks and benefits and still  wished to undergo the indicated procedure. The patient was then brought to the operating room on her hospital bed  stretcher, where she was placed under monitored anesthesia care with an  LMA. After under appropriate anesthesia, we first identified her  pre-manipulation range of motion which was similar, exactly the same as  the findings in the office. We then began gentle manipulation of the  left shoulder, first beginning with forward flexion. We felt a  significant release of the arthrofibrosis at that time. After complete  release in the forward flexion, we then moved and repeated the steps for  abduction, internal and external rotation. There was good release and  then we checked range of motion at that time which was significantly  improved. Abduction was to 160 degrees, forward flexion of 165 degrees,  external rotation to 90 degrees, and internal rotation to 95 degrees. After this portion of the procedure, we then gave an injection in the  posterior portal within the glenohumeral joint of the left shoulder after prepping with Betadine  solution. The injection solution consisted of 1 mL of Depo-Medrol and 2  mL of Marcaine. After an injection, a Band-Aid was applied, and the  patient was gently brought out of monitored anesthesia care by the  Anesthesia Department and brought back to the PACU in stable condition. Dr. Sameer Conway was actively present and participating through  the entirety of the procedure. POSTOPERATIVE INSTRUCTIONS:  The patient was started in physical therapy  directly after the manipulation. She was given Norco for pain control,  and she is started in a daily physical therapy regimen throughout the  next week, where she will then be evaluated in the office and assess for  range of motion at that time.         Sarah Galvan DO    D: 02/18/2021 8:28:49       T: 02/18/2021 11:39:18     [email or web address]  Job#: [id number]     Doc#: [id number]    CC:

## 2021-02-19 NOTE — FLOWSHEET NOTE
[facility]   [address]   Phone: [phone]   Fax: [phone]    Physical Therapy Daily Treatment Note      Date:  2021  Patient Name:  Elieser Kang    :  1960  MRN: [id number]  Medical Diagnosis: M75.02 (ICD-10-CM) - Adhesive capsulitis of left shoulder      Rehab Codes: M75.02, R53.1, M25.561  Onset Date: 2020                                   Next 's appt: 3/3/21  Visit# / total visits: 2/10   Cancels/No Shows: 0/0    Subjective:    Pain:  [x] Yes  [] No Location:  L shoulder Pain Rating: (0-10 scale) 4/10  Pain altered Tx:  [x] No  [] Yes  Action:  Comments: Patient reports today that AROM in LUE is improving grossly. Reported that skin sensation is improving although cont to be diminished. Patient reported that she is able to make more of a fist, although not full. Objective:  Modalities:   Precautions:  Exercises:  Exercise Reps/ Time Weight/ Level Comments   PROM 25  All Planes         Wand AAROM 20x ea  Flexion, ER, Abduction   Pulleys 2'/2'  Flexion, Scaption         Other:    Specific Instructions for next treatment:      Assessment: [x] Progressing toward goals:     [] No change. [] Other:    [x] Patient would continue to benefit from skilled physical therapy services in order to: restore full functional use and strength of L UE to PLOF    : Assessed patient's gross AROM of hand, elbow, wrist and shoulder. Patient demonstrated WNL ROM in elbow/wrist. Patient also demonstrated full opposition to each digit, although difficult to perform. Patient demonstrated ability to make full fist after several reps with difficulty achieving full fist. Assessed skin sensation, patient able to distinguish light touch through entire LUE, although reported sensation continues to be diminished. Patient demonstrated improved ROM both passively and active assistive with continued reps with exercises initiated above. Patient did report slight increase in pain post exercises this date. STG: (to be met in 5 treatments)  1. Pt will self report worst pain no greater than 2/10 in order to better tolerate ADLs with minimal dysfunction  2. Pt will improve PROM of L shoulder to full symmetry (155 deg elevation, 60 deg extension and ER) in order to demonstrate successful CRISTOBAL and full available mobility in shoulder  LTG: (to be met in 10 treatments)  1. Pt will demonstrate improved L UE strength to 4/5 or greater in order to demonstrate improved stability/strength necessary for unrestricted ADLs/work activities  2. Pt will improve AROM of L UE to full symmetry (150 deg elevation, 60 deg extension and ER, 75 deg IR, elbow flexion 120 deg) for full unrestricted reach in all planes  3. Pt will demonstrate symmetrical Apley scratch test (cross body to posterior shoulder, ER to T2, IR to T9) for unrestricted dressing at PLOF  4. Pt will decrease SPADI to 10% impaired or less in order to demonstrate improved functional tolerances at PLOF with minimal restriction/dysfunction  5. Pt will demonstrate independence with a long term HEP for continued progress/maintenance after completion of PT        Functional Assessment Used: SPADI   Current Status Score: 96% impaired  Goal Status Score: 10% impaired    Pt. Education:  [x] Yes  [] No  [x] Reviewed Prior HEP/Ed  Method of Education: [x] Verbal  [x] Demo  [] Written  Comprehension of Education: Demonstrated wall slides/walks, instructed to perform per tolerance. [x] Verbalizes understanding. [x] Demonstrates understanding. [] Needs review. [] Demonstrates/verbalizes HEP/Ed previously given. Plan: [x] Continue per plan of care.    [] Other:      Treatment Charges: Mins Units   []  Modalities     [x]  Ther Exercise 18 1   [x]  Manual Therapy 25 2   []  Ther Activities     []  Aquatics     []  Neuromuscular     [] Vasocompression     [] Gait Training     [] Dry needling        [] 1 or 2 muscles        [] 3 or more muscles     []  Other     Total Treatment time 43 3     Time In: 1910            Time Out: 1828    Electronically signed by:  Losi Aiken PTA

## 2021-02-22 ENCOUNTER — HOSPITAL ENCOUNTER (OUTPATIENT)
Dept: PHYSICAL THERAPY | Age: 61
Setting detail: THERAPIES SERIES
Discharge: HOME | End: 2021-02-22
Payer: COMMERCIAL

## 2021-02-22 PROCEDURE — 97110 THERAPEUTIC EXERCISES: CPT

## 2021-02-22 PROCEDURE — 97140 MANUAL THERAPY 1/> REGIONS: CPT

## 2021-02-22 NOTE — FLOWSHEET NOTE
[facility] Outpatient Physical Therapy   [address]   Phone: [phone]   Fax: [phone]    Physical Therapy Daily Treatment Note      Date:  2021  Patient Name:  Toña Potter    :  1960  MRN: [id number]   Physician: Dennise Starr DO                        Insurance: Medical [place]. Visits BMN  Medical Diagnosis: M75.02 (ICD-10-CM) - Adhesive capsulitis of left shoulder      Rehab Codes: M75.02, R53.1, M25.561  Onset Date: 2020                                   Next 's appt: 3/3/21  Visit# / total visits: 3/10    Cancels/No Shows: 0/0    Subjective:    Pain:  [x] Yes  [] No Location:  L shoulder Pain Rating: (0-10 scale) 4/10  Pain altered Tx:  [x] No  [] Yes  Action:  Comments: : Pt states that she's doing much better and feels that her sensation in her L arm is finally back to normal and has been able to use her L UE for much more. Feels that her  and elbow mobility is back to normal with no complaints in those aspects. 3-4/10 discomfort in L shoulder currently at the start of today's visit. Objective:     All below objective measures last assessed 21      Range of Motion  Left Range of Motion  Right Strength  Left Strength  Right   Shoulder Flexion 90 (AROM standing)  150 (PROM supine) 155 3/5 4/5   Shoulder Abduction 90 (AROM standing)  150 (PROM supine) 155 3/5 4/5   Shoulder Extension 60 60     Shoulder ER 40 60 3/5 4/5   Shoulder IR 70 75 3/5 4+/5   Elbow Flexion WNL  3+/5 4+/5   Elbow Extension 0  3/5 4/5   Pronation WNL      Supination WNL      Wrist Flexion WNL      Wrist Extension WNL       --- ---            Apley- Cross Body  Posterior shoulder --- ---   Jamie Austin- Gross ER  T2 --- ---   Apley- Gross IR  T9 --- ---       Modalities:   Precautions: None  Exercises:  Exercise Reps/ Time Weight/ Level Comments Performed=x   Wand AAROM 20x ea  Flexion, ER, Abduction x   Pulleys 2'/2'  Flexion, Scaption    Standing wall slides 10 reps x 4  2 sets flexion, 2 sets scaption x   Supine punches 15 reps x 2   x                                      Other: Manual therapy x 18'  -PROM in all planes  -L inferior and posterior GHJ glides (Grade III-IV)    Re-evaluation of L shoulder AROM and strength x 8'      Specific Instructions for next treatment: Progress active assistive ROM activities as tolerated followed by progressing RTC/scapular strengthening and stabilization as tolerated      Assessment: [x] Progressing toward goals:     [] No change. [] Other:    [x] Patient would continue to benefit from skilled physical therapy services in order to: restore full functional use and strength of L UE to PLOF    2/22: Significant improvement in sensation and L UE mobility since initial evaluation last week, with all objective measures updated today. PROM is near full (except ER), but active elevation continues to be significantly more limited due to weakness and impaired neuromuscular control. Continued to emphasize AAROM in all planes which will require additional focus over next 1-2 weeks. Mild fatigue and discomfort especially with antigravity wall slides, but overall good tolerance through all. Updated written home program provided today with good understanding. STG: (to be met in 5 treatments)  1. Pt will self report worst pain no greater than 2/10 in order to better tolerate ADLs with minimal dysfunction  2. Pt will improve PROM of L shoulder to full symmetry (155 deg elevation, 60 deg extension and ER) in order to demonstrate successful CRISTOBAL and full available mobility in shoulder  LTG: (to be met in 10 treatments)  1. Pt will demonstrate improved L UE strength to 4/5 or greater in order to demonstrate improved stability/strength necessary for unrestricted ADLs/work activities  2.  Pt will improve AROM of L UE to full symmetry (150 deg elevation, 60 deg extension and ER, 75 deg IR, elbow flexion 120 deg) for full unrestricted reach in all planes  3. Pt will demonstrate symmetrical Apley scratch test (cross body to posterior shoulder, ER to T2, IR to T9) for unrestricted dressing at PLOF   4. Pt will decrease SPADI to 10% impaired or less in order to demonstrate improved functional tolerances at PLOF with minimal restriction/dysfunction  5. Pt will demonstrate independence with a long term HEP for continued progress/maintenance after completion of PT        Functional Assessment Used: SPADI   Current Status Score: 96% impaired  Goal Status Score: 10% impaired    Pt. Education:  [x] Yes  [] No  [x] Reviewed Prior HEP/Ed  Method of Education: [x] Verbal  [x] Demo  [x] Written  Comprehension of Education: Demonstrated wall slides/walks, instructed to perform per tolerance. [x] Verbalizes understanding. [x] Demonstrates understanding. [] Needs review. [] Demonstrates/verbalizes HEP/Ed previously given. Plan: [x] Continue per plan of care.    [] Other:      Treatment Charges: Mins Units   []  Modalities     [x]  Ther Exercise 27 2   [x]  Manual Therapy 18 1   []  Ther Activities     []  Aquatics     []  Neuromuscular     [] Vasocompression     [] Gait Training     [] Dry needling        [] 1 or 2 muscles        [] 3 or more muscles     []  Other     Total Treatment time 45 3     Time In: 9:35am            Time Out: 10:15am    Electronically signed by:  Olive Madrigal PT

## 2021-02-23 ENCOUNTER — HOSPITAL ENCOUNTER (OUTPATIENT)
Dept: PHYSICAL THERAPY | Age: 61
Setting detail: THERAPIES SERIES
Discharge: HOME | End: 2021-02-23
Payer: COMMERCIAL

## 2021-02-23 PROCEDURE — 97140 MANUAL THERAPY 1/> REGIONS: CPT

## 2021-02-23 PROCEDURE — 97110 THERAPEUTIC EXERCISES: CPT

## 2021-02-23 NOTE — FLOWSHEET NOTE
[address] [place] [address] Outpatient Physical Therapy   [facility] [address] [facility] [address]   Phone: [phone]   Fax: [phone]    Physical Therapy Daily Treatment Note      Date:  2021  Patient Name:  Lizbeth July    :  1960  MRN: [id number]   Physician: Elias Rodriguez DO                        Insurance: Medical [place]. Visits BMN  Medical Diagnosis: M75.02 (ICD-10-CM) - Adhesive capsulitis of left shoulder      Rehab Codes: M75.02, R53.1, M25.561  Onset Date: 2020                                   Next 's appt: 3/3/21  Visit# / total visits: /10    Cancels/No Shows: 0/0    Subjective:    Pain:  [x] Yes  [] No Location:  L shoulder Pain Rating: (0-10 scale) 4/10  Pain altered Tx:  [x] No  [] Yes  Action:  Comments: : Pt states that she was sore after yesterday's visit but feels mostly ok today. 4/10 discomfort currently at the start of today's visit in L shoulder. Objective:     All below objective measures last assessed 21      Range of Motion  Left Range of Motion  Right Strength  Left Strength  Right   Shoulder Flexion 90 (AROM standing)  150 (PROM supine) 155 3/5 4/5   Shoulder Abduction 90 (AROM standing)  150 (PROM supine) 155 3/5 4/5   Shoulder Extension 60 60     Shoulder ER 40 60 3/5 4/5   Shoulder IR 70 75 3/5 4+/5   Elbow Flexion WNL  3+/5 4+/5   Elbow Extension 0  3/5 4/5   Pronation WNL      Supination WNL      Wrist Flexion WNL      Wrist Extension WNL       --- ---            Apley- Cross Body  Posterior shoulder --- ---   Marikay Hodgkins- Gross ER  T2 --- ---   Apley- Gross IR  T9 --- ---       Modalities:   Precautions: None  Exercises:  Exercise Reps/ Time Weight/ Level Comments Performed=x   Wand AAROM 20x ea  Flexion, ER, Abduction    Pulleys 2'/2'  Flexion, Scaption x   Standing wall slides 10 reps x 4  2 sets flexion, 2 sets scaption x   Supine punches 15 reps x 2      Supine shoulder flexion AROM 10 reps x 2  Weight of arm for passive overpressure at end range x   Supine shoulder abduction AAROM 10 reps x 2  Light support from PT under L arm x   Alternating isometrics and rhythmic stabilizations to L shoulder in supine 5'  RS challenging ER/IR at 0 deg  AI in all planes at 90 deg flex x   UBE 5'  Forwards; AAROM on L x          Other: Manual therapy x 15'  -PROM in all planes  -L inferior and posterior GHJ glides (Grade III-IV)      Specific Instructions for next treatment: Progress active assistive ROM activities as tolerated followed by progressing RTC/scapular strengthening and stabilization as tolerated      Assessment: [x] Progressing toward goals:     [] No change. [] Other:    [x] Patient would continue to benefit from skilled physical therapy services in order to: restore full functional use and strength of L UE to PLOF    2/23: 6/10 discomfort at end range mobility in all planes, but otherwise good effort through all interventions today. AROM continues to improve with pt able to actively elevate L shoulder against gravity >100 deg, although with high levels of effort required. Updated formal measurements to be obtained at next visit for progress note. STG: (to be met in 5 treatments)  1. Pt will self report worst pain no greater than 2/10 in order to better tolerate ADLs with minimal dysfunction   2. Pt will improve PROM of L shoulder to full symmetry (155 deg elevation, 60 deg extension and ER) in order to demonstrate successful CRISTOBAL and full available mobility in shoulder   LTG: (to be met in 10 treatments)  1. Pt will demonstrate improved L UE strength to 4/5 or greater in order to demonstrate improved stability/strength necessary for unrestricted ADLs/work activities   2. Pt will improve AROM of L UE to full symmetry (150 deg elevation, 60 deg extension and ER, 75 deg IR, elbow flexion 120 deg) for full unrestricted reach in all planes   3.  Pt will demonstrate symmetrical Apley scratch test (cross body to posterior shoulder, ER to T2, IR to T9) for unrestricted dressing at PLOF   4. Pt will decrease SPADI to 10% impaired or less in order to demonstrate improved functional tolerances at PLOF with minimal restriction/dysfunction   5. Pt will demonstrate independence with a long term HEP for continued progress/maintenance after completion of PT         Functional Assessment Used: SPADI   Current Status Score: 96% impaired  Goal Status Score: 10% impaired    Pt. Education:  [x] Yes  [] No  [x] Reviewed Prior HEP/Ed  Method of Education: [x] Verbal  [x] Demo  [] Written  Comprehension of Education:   [x] Verbalizes understanding. [x] Demonstrates understanding. [] Needs review. [] Demonstrates/verbalizes HEP/Ed previously given. Plan: [x] Continue per plan of care.    [] Other: Progress note next visit       Treatment Charges: Mins Units   []  Modalities     [x]  Ther Exercise 24 2   [x]  Manual Therapy 15 1   []  Ther Activities     []  Aquatics     []  Neuromuscular     [] Vasocompression     [] Gait Training     [] Dry needling        [] 1 or 2 muscles        [] 3 or more muscles     []  Other     Total Treatment time 39 3     Time In: 11:18am            Time Out: 11:57am    Electronically signed by:  Marah Alvarez, PT

## 2021-02-24 ENCOUNTER — HOSPITAL ENCOUNTER (OUTPATIENT)
Dept: PHYSICAL THERAPY | Age: 61
Setting detail: THERAPIES SERIES
Discharge: HOME | End: 2021-02-24
Payer: COMMERCIAL

## 2021-02-24 PROCEDURE — 97110 THERAPEUTIC EXERCISES: CPT

## 2021-02-24 PROCEDURE — 97140 MANUAL THERAPY 1/> REGIONS: CPT

## 2021-02-24 NOTE — PROGRESS NOTES
509 [facility] Outpatient Physical Therapy   [address]   Phone: [phone]   Fax: [phone]    Physical Therapy Progress Note      Date:  2021  Patient Name:  Keila Weller    :  1960  MRN: [id number]   Physician: Catherine Block DO                        Insurance: Medical [place]. Visits BMN  Medical Diagnosis: M75.02 (ICD-10-CM) - Adhesive capsulitis of left shoulder      Rehab Codes: M75.02, R53.1, M25.561  Onset Date: 2020. CRISTOBAL 21                       Next 's appt: 3/3/21  Visit# / total visits: 5/10    Cancels/No Shows: 0/0    Subjective:    Pain:  [x] Yes  [] No Location:  L shoulder Pain Rating: (0-10 scale) 4/10  Pain altered Tx:  [x] No  [] Yes  Action:  Comments: : Pt states that her shoulder was very sore last night after doing a lot of cleaning around her house, but feels better this morning. 4/10 discomfort currently but still feels very tight. Progress note performed today for reporting period 21-21. Objective:     All below objective measures last assessed 21      Range of Motion  Left Range of Motion  Right Strength  Left Strength  Right   Shoulder Flexion 115 / 145 155 3/5 4/5   Shoulder Abduction 130 / 150   155 3/5 4/5   Shoulder Extension 60 60     Shoulder ER 50 / 52 60 3/5 4/5   Shoulder IR 75 75 3/5 4+/5   Elbow Flexion WNL  3+/5 4+/5   Elbow Extension 0  3/5 4/5   Pronation WNL      Supination WNL      Wrist Flexion WNL      Wrist Extension WNL       --- ---            Apley- Cross Body Lateral shoulder Posterior shoulder --- ---   Aretta Nearing ER T1 T2 --- ---   Mariah De La Torre- Gross IR Lateral buttock T9 --- ---     Sensation: WNL with no numbness or paresthesia in L UE    Modalities:   Precautions: None  Exercises:  Exercise Reps/ Time Weight/ Level Comments Performed=x   Wand AAROM 20x ea  Flexion, ER, Abduction    Pulleys 2'/2'  Flexion, Scaption    Standing wall slides 10 reps x 4  2 sets flexion, 2 sets scaption x   Supine punches 15 reps x 2      Supine shoulder flexion AROM 10 reps x 2  Weight of arm for passive overpressure at end range x   Supine shoulder abduction AAROM 10 reps x 2  Light support from PT under L arm x   Alternating isometrics and rhythmic stabilizations to L shoulder in supine 5'  RS challenging ER/IR at 0 deg  AI in all planes at 90 deg flex x   UBE 5'  Forwards; AAROM on L x          Other: Manual therapy x 15'  -PROM in all planes  -L inferior and posterior GHJ glides (Grade III-IV)    Re-evaluation of all objective measures- billed as therapeutic exercise      Specific Instructions for next treatment: Progress active assistive ROM activities as tolerated followed by progressing RTC/scapular strengthening and stabilization as tolerated      Assessment: [x] Progressing toward goals:     [] No change. [] Other:    [x] Patient would continue to benefit from skilled physical therapy services in order to: restore full functional use and strength of L UE to PLOF    Pt has been seen for 5 PT visits to date, now 1 week s/p L shoulder CRISTOBAL. Pt overall is making good initial progress in PT with improving pain levels and notable improvements in active and passive ROM in all planes. Pt's PROM is approaching full symmetry, but continues to be mildly restricted. AROM still more limited at this time with compensatory patterns noted with antigravity elevation due to RTC and scapular weakness. Good effort through PT visits and with home program to date, and will continue to require skilled PT intervention to restore full functional use of L UE. Good prognosis based on progress to date. STG: (to be met in 8 treatments)  1. Pt will self report worst pain no greater than 2/10 in order to better tolerate ADLs with minimal dysfunction GOAL PROGRESSING AND EXTENDED 2/24  2.  Pt will improve PROM of L shoulder to full symmetry (155 deg elevation, 60 deg extension and ER) in order to demonstrate successful CRISTOBAL and full available mobility in shoulder GOAL PROGRESSING AND EXTENDED 2/24  LTG: (to be met in 10 treatments)  1. Pt will demonstrate improved L UE strength to 4/5 or greater in order to demonstrate improved stability/strength necessary for unrestricted ADLs/work activities   2. Pt will improve AROM of L UE to full symmetry (150 deg elevation, 60 deg extension and ER, 75 deg IR, elbow flexion 120 deg) for full unrestricted reach in all planes   3. Pt will demonstrate symmetrical Apley scratch test (cross body to posterior shoulder, ER to T2, IR to T9) for unrestricted dressing at PLOF   4. Pt will decrease SPADI to 10% impaired or less in order to demonstrate improved functional tolerances at PLOF with minimal restriction/dysfunction   5. Pt will demonstrate independence with a long term HEP for continued progress/maintenance after completion of PT         Functional Assessment Used: SPADI   Current Status Score: 96% impaired  Goal Status Score: 10% impaired    Pt. Education:  [x] Yes  [] No  [x] Reviewed Prior HEP/Ed  Method of Education: [x] Verbal  [x] Demo  [] Written  Comprehension of Education:   [x] Verbalizes understanding. [x] Demonstrates understanding. [] Needs review. [] Demonstrates/verbalizes HEP/Ed previously given. Plan: [x] Continue per plan of care.    [] Other:       Treatment Charges: Mins Units   []  Modalities     [x]  Ther Exercise 26 2   [x]  Manual Therapy 15 1   []  Ther Activities     []  Aquatics     []  Neuromuscular     [] Vasocompression     [] Gait Training     [] Dry needling        [] 1 or 2 muscles        [] 3 or more muscles     []  Other     Total Treatment time 41 3     Time In: 10:02am            Time Out: 10:43am    Electronically signed by:  Rodríguez Hassan PT

## 2021-02-25 ENCOUNTER — HOSPITAL ENCOUNTER (OUTPATIENT)
Dept: PHYSICAL THERAPY | Age: 61
Setting detail: THERAPIES SERIES
Discharge: HOME | End: 2021-02-25
Payer: COMMERCIAL

## 2021-02-25 PROCEDURE — 97110 THERAPEUTIC EXERCISES: CPT

## 2021-02-25 PROCEDURE — 97140 MANUAL THERAPY 1/> REGIONS: CPT

## 2021-02-25 NOTE — FLOWSHEET NOTE
Supine shoulder flexion AROM 10 reps x 2  Weight of arm for passive overpressure at end range    Supine shoulder abduction AAROM 10 reps x 2  Light support from PT under L arm    Supine D2 PNF 10 reps x 2  AROM only; no resistance today x   Alternating isometrics and rhythmic stabilizations to L shoulder in supine 5'  RS challenging ER/IR at 0 deg  AI in all planes at 90 deg flex x   UBE 5'  Forwards; AAROM on L x   Sidelying ER 15 reps x 3   x   Banded isometric ER/IR stepouts 15 reps x 2 ea Green tubing  x   Banded rows (low) 15 reps x 2 Green tubing  x          Other: Manual therapy x 10'  -PROM in all planes  -L inferior and posterior GHJ glides (Grade III-IV)      Specific Instructions for next treatment: Progress active assistive ROM activities as tolerated followed by progressing RTC/scapular strengthening and stabilization as tolerated      Assessment: [x] Progressing toward goals:     [] No change. [] Other:    [x] Patient would continue to benefit from skilled physical therapy services in order to: restore full functional use and strength of L UE to PLOF    2/25: AROM continues to get easier each visit, but had difficulty with addition of active PNF diagonals and sidelying ER today. Progressed strengthening/stabilization activities with fatigue through all but overall good tolerance and motivation through visit today. Improved speed and effort with L UE on UBE today, with ability to go both forwards and backwards for the first time with minimal discomfort. Compliance with HEP continues to be consistent and steady gains noted with each visit. STG: (to be met in 8 treatments)  1. Pt will self report worst pain no greater than 2/10 in order to better tolerate ADLs with minimal dysfunction GOAL PROGRESSING AND EXTENDED 2/24  2.  Pt will improve PROM of L shoulder to full symmetry (155 deg elevation, 60 deg extension and ER) in order to demonstrate successful CRISTOBAL and full available mobility in shoulder GOAL PROGRESSING AND EXTENDED 2/24  LTG: (to be met in 10 treatments)  1. Pt will demonstrate improved L UE strength to 4/5 or greater in order to demonstrate improved stability/strength necessary for unrestricted ADLs/work activities   2. Pt will improve AROM of L UE to full symmetry (150 deg elevation, 60 deg extension and ER, 75 deg IR, elbow flexion 120 deg) for full unrestricted reach in all planes   3. Pt will demonstrate symmetrical Apley scratch test (cross body to posterior shoulder, ER to T2, IR to T9) for unrestricted dressing at PLOF   4. Pt will decrease SPADI to 10% impaired or less in order to demonstrate improved functional tolerances at PLOF with minimal restriction/dysfunction   5. Pt will demonstrate independence with a long term HEP for continued progress/maintenance after completion of PT        Pt. Education:  [x] Yes  [] No  [x] Reviewed Prior HEP/Ed  Method of Education: [x] Verbal  [x] Demo  [] Written  Comprehension of Education:   [x] Verbalizes understanding. [x] Demonstrates understanding. [] Needs review. [] Demonstrates/verbalizes HEP/Ed previously given. Plan: [x] Continue per plan of care.    [] Other:       Treatment Charges: Mins Units   []  Modalities     [x]  Ther Exercise 30 2   [x]  Manual Therapy 10 1   []  Ther Activities     []  Aquatics     []  Neuromuscular     [] Vasocompression     [] Gait Training     [] Dry needling        [] 1 or 2 muscles        [] 3 or more muscles     []  Other     Total Treatment time 40 3     Time In: 10:00am            Time Out: 10:40am    Electronically signed by:  Eyad Jackson PT

## 2021-02-26 ENCOUNTER — HOSPITAL ENCOUNTER (OUTPATIENT)
Dept: PHYSICAL THERAPY | Age: 61
Setting detail: THERAPIES SERIES
Discharge: HOME | End: 2021-02-26
Payer: COMMERCIAL

## 2021-02-26 PROCEDURE — 97140 MANUAL THERAPY 1/> REGIONS: CPT

## 2021-02-26 PROCEDURE — 97110 THERAPEUTIC EXERCISES: CPT

## 2021-02-26 NOTE — FLOWSHEET NOTE
509 [facility] Outpatient Physical Therapy   [address]   Phone: [phone]   Fax: [phone]    Physical Therapy Daily Treatment Note      Date:  2021  Patient Name:  Patricio Jose    :  1960  MRN: [id number]   Physician: Cuauhtemoc Fuentes DO                        Insurance: Medical [place]. Visits BMN  Medical Diagnosis: M75.02 (ICD-10-CM) - Adhesive capsulitis of left shoulder      Rehab Codes: M75.02, R53.1, M25.561  Onset Date: 2020. CRISTOBAL 21                       Next 's appt: 3/3/21  Visit# / total visits: 7/10    Cancels/No Shows: 0/0    Subjective:    Pain:  [x] Yes  [] No Location:  L shoulder Pain Rating: (0-10 scale) 2/10  Pain altered Tx:  [x] No  [] Yes  Action:  Comments: : Patient reports with no new complaints. Reports continued soreness and discomfort in shoulder with minimal pain, even with progressions made last visit. Objective:     All below objective measures last assessed 21      Range of Motion  Left Range of Motion  Right Strength  Left Strength  Right   Shoulder Flexion 115 / 145 155 3/5 4/5   Shoulder Abduction 130 / 150   155 3/5 4/5   Shoulder Extension 60 60     Shoulder ER 50 / 52 60 3/5 4/5   Shoulder IR 75 75 3/5 4+/5   Elbow Flexion WNL  3+/5 4+/5   Elbow Extension 0  3/5 4/5   Pronation WNL      Supination WNL      Wrist Flexion WNL      Wrist Extension WNL       --- ---            Apley- Cross Body Lateral shoulder Posterior shoulder --- ---   Lj Fire ER T1 T2 --- ---   Anthony Avila- Gross IR Lateral buttock T9 --- ---     Sensation: WNL with no numbness or paresthesia in L UE    Modalities:   Precautions: None  Exercises:  Exercise Reps/ Time Weight/ Level Comments Performed=x   Wand AAROM 20x ea  Flexion, ER, Abduction    Pulleys 2'/2'  Flexion, Scaption x   Standing wall slides 10 reps x 4  2 sets flexion, 2 sets scaption x   Supine punches 15 reps x 2      Supine shoulder flexion AROM 10 reps x 2 ADLs/work activities   2. Pt will improve AROM of L UE to full symmetry (150 deg elevation, 60 deg extension and ER, 75 deg IR, elbow flexion 120 deg) for full unrestricted reach in all planes   3. Pt will demonstrate symmetrical Apley scratch test (cross body to posterior shoulder, ER to T2, IR to T9) for unrestricted dressing at PLOF   4. Pt will decrease SPADI to 10% impaired or less in order to demonstrate improved functional tolerances at PLOF with minimal restriction/dysfunction   5. Pt will demonstrate independence with a long term HEP for continued progress/maintenance after completion of PT        Pt. Education:  [x] Yes  [] No  [x] Reviewed Prior HEP/Ed  Method of Education: [x] Verbal  [x] Demo  [] Written  Comprehension of Education:   [x] Verbalizes understanding. [x] Demonstrates understanding. [] Needs review. [] Demonstrates/verbalizes HEP/Ed previously given. Plan: [x] Continue per plan of care.    [] Other:       Treatment Charges: Mins Units   []  Modalities     [x]  Ther Exercise 30 2   [x]  Manual Therapy 15 1   []  Ther Activities     []  Aquatics     []  Neuromuscular     [] Vasocompression     [] Gait Training     [] Dry needling        [] 1 or 2 muscles        [] 3 or more muscles     []  Other     Total Treatment time 45 3     Time In: 9:30am            Time Out: 10:20 am    Electronically signed by:  Norma Jama PTA

## 2021-03-01 ENCOUNTER — HOSPITAL ENCOUNTER (OUTPATIENT)
Dept: PHYSICAL THERAPY | Age: 61
Setting detail: THERAPIES SERIES
Discharge: HOME | End: 2021-03-01
Payer: COMMERCIAL

## 2021-03-01 PROCEDURE — 97110 THERAPEUTIC EXERCISES: CPT

## 2021-03-01 PROCEDURE — 97140 MANUAL THERAPY 1/> REGIONS: CPT

## 2021-03-01 NOTE — FLOWSHEET NOTE
x 2      Supine shoulder flexion  10 reps x 2 1# 1# DB for passive overpressure at end range    Sidelying shoulder abduction AROM 10 reps x 2  Weight of arm for passive overpressure at end range x   Supine D2 PNF 10 reps x 2  AROM only; no resistance today    Alternating isometrics and rhythmic stabilizations to L shoulder in supine 5'  RS challenging ER/IR at 0 deg  AI in all planes at 90 deg flex x   UBE 5'  Forwards; AAROM on L x   Sidelying ER 15 reps x 3   x   Banded isometric ER/IR stepouts 15 reps x 2 ea Green tubing  x   Banded rows (low) 15 reps x 2 Green tubing  x   Banded shoulder flexion to 90 deg 10 reps x 2 Green tubing Bilateral x                 Other: Manual therapy x 11'  -PROM in all planes  -L inferior and posterior GHJ glides (Grade III-IV)      Specific Instructions for next treatment: Progress active assistive ROM activities as tolerated followed by progressing RTC/scapular strengthening and stabilization as tolerated      Assessment: [x] Progressing toward goals:     [] No change. [] Other:    [x] Patient would continue to benefit from skilled physical therapy services in order to: restore full functional use and strength of L UE to PLOF    3/1: Pt's comfort level through all planes of AROM continues to improve, but ER continues to be pt's most limited plane. Pt has been compliant with all home exercises to date and is overall progressing steadily towards goals although will still require additional range of motion and strength for full unrestricted use of L UE. Improved javed on UBE today and good tolerance with banded shoulder flexion progression although with notable difficulty/fatigue. Progress note due in 2 visits to determine additional skilled PT needs. STG: (to be met in 8 treatments)  1. Pt will self report worst pain no greater than 2/10 in order to better tolerate ADLs with minimal dysfunction GOAL PROGRESSING AND EXTENDED 2/24  2.  Pt will improve PROM of L shoulder to Eye Protection Verbiage: Before proceeding with the stage, a plastic scleral shield was inserted. The globe was anesthetized with a few drops of 1% lidocaine with 1:100,000 epinephrine. Then, an appropriate sized scleral shield was chosen and coated with lacrilube ointment. The shield was gently inserted and left in place for the duration of each stage. After the stage was completed, the shield was gently removed.

## 2021-03-02 ENCOUNTER — HOSPITAL ENCOUNTER (OUTPATIENT)
Dept: PHYSICAL THERAPY | Age: 61
Setting detail: THERAPIES SERIES
Discharge: HOME | End: 2021-03-02
Payer: COMMERCIAL

## 2021-03-02 PROCEDURE — 97140 MANUAL THERAPY 1/> REGIONS: CPT

## 2021-03-02 PROCEDURE — 97110 THERAPEUTIC EXERCISES: CPT

## 2021-03-02 NOTE — FLOWSHEET NOTE
[facility]   [address]   Phone: [phone]   Fax: [phone]    Physical Therapy Daily Treatment Note      Date:  3/2/2021  Patient Name:  Iva Cano    :  1960  MRN: [id number]   Physician: Ashleigh Gonzalez DO                        Insurance: [facility]. Visits BMN  Medical Diagnosis: M75.02 (ICD-10-CM) - Adhesive capsulitis of left shoulder      Rehab Codes: M75.02, R53.1, M25.561  Onset Date: 2020. CRISTOBAL 21                       Next 's appt: 3/3/21  Visit# / total visits: 9/10    Cancels/No Shows: 0/0    Subjective:    Pain:  [x] Yes  [] No Location:  L shoulder Pain Rating: (0-10 scale) 1/10  Pain altered Tx:  [x] No  [] Yes  Action:  Comments: 3/2: Pt states that she was quite sore after her previous visit but that it only lasted about an hour and a half after her appointment. 1/10 discomfort in L shoulder currently. Objective:     All below objective measures last assessed 21      Range of Motion  Left Range of Motion  Right Strength  Left Strength  Right   Shoulder Flexion 115 / 145 155 3/5 4/5   Shoulder Abduction 130 / 150   155 3/5 4/5   Shoulder Extension 60 60     Shoulder ER 50 / 52 60 3/5 4/5   Shoulder IR 75 75 3/5 4+/5   Elbow Flexion WNL  3+/5 4+/5   Elbow Extension 0  3/5 4/5   Pronation WNL      Supination WNL      Wrist Flexion WNL      Wrist Extension WNL       --- ---            Apley- Cross Body Lateral shoulder Posterior shoulder --- ---   Sofi Xu ER T1 T2 --- ---   Beverley Todd- Gross IR Lateral buttock T9 --- ---     Sensation: WNL with no numbness or paresthesia in L UE    Modalities:   Precautions: None  Exercises:  Exercise Reps/ Time Weight/ Level Comments Performed=x   Wand AAROM 20x ea  Flexion, ER, Abduction    Pulleys 2'  Abduction only x   Standing wall slides 15 reps x 2  1 set flexion, 1 set scaption    Supine punches 15 reps x 2      Supine shoulder flexion  15 reps x 2 1# 1# DB for passive overpressure at end range x   Sidelying shoulder abduction AROM 15 reps x 2  Weight of arm for passive overpressure at end range x   Supine D2 PNF 10 reps x 2  Light manual resistance through range x   Alternating isometrics and rhythmic stabilizations to L shoulder in supine 5'  RS challenging ER/IR at 0 deg  AI in all planes at 90 deg flex    UBE 5'  Forwards; AAROM on L x   Sidelying ER 15 reps x 3      Banded isometric ER/IR stepouts 15 reps x 2 ea Green tubing     Banded rows (low) 15 reps x 2 Green tubing  x   Banded shoulder flexion to 90 deg 10 reps x 2 Green tubing Bilateral                  Other: Manual therapy x 10'  -PROM in all planes  -L inferior and posterior GHJ glides (Grade III-IV)      Specific Instructions for next treatment: Progress active assistive ROM activities as tolerated followed by progressing RTC/scapular strengthening and stabilization as tolerated      Assessment: [x] Progressing toward goals:     [] No change. [] Other:    [x] Patient would continue to benefit from skilled physical therapy services in order to: restore full functional use and strength of L UE to PLOF    3/2: Pt was able to perform active PNF patterns with light manual resistance today for the first time, showing improving strength and neuromuscular control in L shoulder. Initial discomfort with this activity but alleviated with repetition, with primarily just fatigue by the end of this activity. Continued to use pulleys to improve range with overhead abduction, which continues to be restricted and difficult for patient. Formal progress note due at next visit to determine additional skilled PT needs. STG: (to be met in 8 treatments)  1. Pt will self report worst pain no greater than 2/10 in order to better tolerate ADLs with minimal dysfunction GOAL PROGRESSING AND EXTENDED 2/24  2.  Pt will improve PROM of L shoulder to full symmetry (155 deg elevation, 60 deg extension and ER) in order to demonstrate successful CRISTOBAL and full available mobility in shoulder GOAL PROGRESSING AND EXTENDED 2/24  LTG: (to be met in 10 treatments)  1. Pt will demonstrate improved L UE strength to 4/5 or greater in order to demonstrate improved stability/strength necessary for unrestricted ADLs/work activities   2. Pt will improve AROM of L UE to full symmetry (150 deg elevation, 60 deg extension and ER, 75 deg IR, elbow flexion 120 deg) for full unrestricted reach in all planes   3. Pt will demonstrate symmetrical Apley scratch test (cross body to posterior shoulder, ER to T2, IR to T9) for unrestricted dressing at PLOF   4. Pt will decrease SPADI to 10% impaired or less in order to demonstrate improved functional tolerances at PLOF with minimal restriction/dysfunction   5. Pt will demonstrate independence with a long term HEP for continued progress/maintenance after completion of PT        Pt. Education:  [x] Yes  [] No  [x] Reviewed Prior HEP/Ed  Method of Education: [x] Verbal  [x] Demo  [] Written  Comprehension of Education:   [x] Verbalizes understanding. [x] Demonstrates understanding. [] Needs review. [] Demonstrates/verbalizes HEP/Ed previously given. Plan: [x] Continue per plan of care.    [x] Other: Progress note next visit       Treatment Charges: Mins Units   []  Modalities     [x]  Ther Exercise 34 2   [x]  Manual Therapy 10 1   []  Ther Activities     []  Aquatics     []  Neuromuscular     [] Vasocompression     [] Gait Training     [] Dry needling        [] 1 or 2 muscles        [] 3 or more muscles     []  Other     Total Treatment time 44 3     Time In: 10:00am            Time Out: 10:44 am    Electronically signed by:  Kerry Meredith PT

## 2021-03-03 ENCOUNTER — OFFICE VISIT (OUTPATIENT)
Dept: ORTHOPEDIC SURGERY | Age: 61
End: 2021-03-03
Payer: COMMERCIAL

## 2021-03-03 ENCOUNTER — HOSPITAL ENCOUNTER (OUTPATIENT)
Dept: PHYSICAL THERAPY | Age: 61
Setting detail: THERAPIES SERIES
Discharge: HOME | End: 2021-03-03
Payer: COMMERCIAL

## 2021-03-03 VITALS — BODY MASS INDEX: 35.26 KG/M2 | WEIGHT: 199 LBS | HEIGHT: 63 IN

## 2021-03-03 DIAGNOSIS — M75.02 ADHESIVE CAPSULITIS OF LEFT SHOULDER: Primary | ICD-10-CM

## 2021-03-03 PROCEDURE — 99213 OFFICE O/P EST LOW 20 MIN: CPT | Performed by: ORTHOPAEDIC SURGERY

## 2021-03-03 PROCEDURE — 97110 THERAPEUTIC EXERCISES: CPT

## 2021-03-03 ASSESSMENT — ENCOUNTER SYMPTOMS
APNEA: 0
CHEST TIGHTNESS: 0
VOMITING: 0
COUGH: 0
ABDOMINAL PAIN: 0

## 2021-03-03 NOTE — PROGRESS NOTES
[facility]  [address] [address]  Dept: [phone]  Dept Fax: [phone]        Postoperative follow-up note    Subjective:   Lizzy Mann is a 62y.o. year old female who presents to our office today for postoperative followup regarding her   1. Adhesive capsulitis of left shoulder    . Chief Complaint   Patient presents with    Post-Op Check     DOS 02/17/2021 Left Shoulder CRISTOBAL with injection        Lizzy Mann  is a 62y.o. year old female who presents to our office today for postoperative follow up after having undergone a Left shoulder manipulation under anesthesia with corticosteroid injection on 2/17/21. The patient denies fevers, chills, nausea, vomiting, diarrhea. The patient has started physical therapy. The patient states that she has been working very aggressively on formal therapy and home therapy. The patient has noted less pain and increased range of motion. The patient is very happy with the shoulder so far. The patient has been off of work at this time but is ready to return to work. Review of Systems   Constitutional: Negative for chills and fever. Respiratory: Negative for apnea, cough and chest tightness. Cardiovascular: Negative for chest pain and palpitations. Gastrointestinal: Negative for abdominal pain and vomiting. Genitourinary: Negative for difficulty urinating. Musculoskeletal: Positive for arthralgias (left shoulder). Negative for gait problem, joint swelling and myalgias. Neurological: Negative for dizziness, weakness and numbness. I have reviewed the CC, HPI, ROS, PMH, FHX, Social History, and if not present in this note, I have reviewed in the patient's chart. I agree with the documentation provided by other staff and have reviewed their documentation prior to providing my signature indicating agreement.     Objective : General: Elieser Kang is a 62 y.o. female who is alert and oriented and sitting comfortably in our office. Ortho Exam  MS:   F=160 left shoulder , GQ=074 left shoulder, good rotator cuff strength on the left. No outward deformity of the left shoulder is appreciated. Rotator cuff strength appears to be intact and symmetric bilaterally. Motor, sensory, vascular examination to the left upper extremity is grossly intact without focal deficits. Neuro: alert. oriented  Eyes: Extra-ocular muscles intact  Mouth: Oral mucosa moist. No perioral lesions  Pulm: Respirations unlabored and regular. Skin: warm, well perfused  Psych:   Patient has good fund of knowledge and displays understanging of exam, diagnosis, and plan. Assessment:      1. Adhesive capsulitis of left shoulder         Plan:      Patient should continue to go to formal therapy. She is okay to move from 5 days of the week to 3 days a week for formal therapy. The patient is okay to continue all activities as tolerates. The patient is okay to return to work without restrictions. The patient to follow up in the office in 3 months for range of motion check. The patient knows to call with any questions or concerns. Follow up: Return in about 3 months (around 6/3/2021). No orders of the defined types were placed in this encounter. No orders of the defined types were placed in this encounter. Phuong Jacob LPN am scribing for and in the presence of Dr. Parth Thornton  3/4/2021 1:00 PM      I have reviewed and made changes accordingly to the work scribed by Po Leach LPN. The documentation accurately reflects work and decisions made by me. I have also reviewed documentation completed by clinical staff.     Parth Thornton DO, [facility]  3/4/2021 1:02 PM

## 2021-03-03 NOTE — PROGRESS NOTES
[facility]   [address]   Phone: [phone]   Fax: [phone]    Physical Therapy Progress Note      Date:  3/3/2021  Patient Name:  Araceli Vicente    :  1960  MRN: [id number]   Physician: Suly Varela DO                        Insurance: [employer] [place]. Visits BMN  Medical Diagnosis: M75.02 (ICD-10-CM) - Adhesive capsulitis of left shoulder      Rehab Codes: M75.02, R53.1, M25.561  Onset Date: 2020. CRISTOBAL 21                       Next 's appt: 21  Visit# / total visits: 10/20    Cancels/No Shows: 0/0    Subjective:    Pain:  [x] Yes  [] No Location:  L shoulder Pain Rating: (0-10 scale) 1/10  Pain altered Tx:  [x] No  [] Yes  Action:  Comments: 3/3: Pt states that her shoulder is still doing pretty well but is very stressed at the moment as she has a last minute Zoom interview to do later this morning. Requests to leave therapy early at 10:25 today. Feels about 60% back to normal in regard to her L shoulder function. Formal progress note performed today for reporting period -3/1. SPADI score (3/3): 31% impaired    Objective:     All below objective measures last assessed 3/3/21      Range of Motion  Left Range of Motion  Right Strength  Left Strength  Right   Shoulder Flexion 150 /160 155 3+/5 4/5   Shoulder Abduction 150 / 160   155 3+/5 4/5   Shoulder Extension 60 60     Shoulder ER 55 / 60 60 3+/5 4/5   Shoulder IR 75 75 4/5 4+/5   Elbow Flexion WNL  4+/5 4+/5   Elbow Extension 0  3+/5 4/5   Pronation WNL      Supination WNL      Wrist Flexion WNL      Wrist Extension WNL       --- ---            Apley- Cross Body Lateral shoulder Posterior shoulder --- ---   Giselle Nice- Gross ER T1 T2 --- ---   Apley- Gross IR Buttock T9 --- ---     Sensation: WNL with no numbness or paresthesia in L UE    Modalities:   Precautions: None  Exercises:  Exercise Reps/ Time Weight/ Level Comments Performed=x   Wand AAROM 20x ea Flexion, ER, Abduction    Pulleys 2'  Abduction only    Standing wall slides 15 reps x 2  1 set flexion, 1 set scaption    Supine punches 15 reps x 2      Supine shoulder flexion  15 reps x 2 1# 1# DB for passive overpressure at end range    Sidelying shoulder abduction AROM 15 reps x 2  Weight of arm for passive overpressure at end range    Supine D2 PNF 10 reps x 2  Light manual resistance through range    Alternating isometrics and rhythmic stabilizations to L shoulder in supine 5'  RS challenging ER/IR at 0 deg  AI in all planes at 90 deg flex    UBE 5'  Forwards; AAROM on L    Sidelying ER 15 reps x 3      Banded isometric ER/IR stepouts 15 reps x 2 ea Green tubing     Banded rows (low) 15 reps x 2 Green tubing     Banded shoulder flexion to 90 deg 10 reps x 2 Green tubing Bilateral                  Other:   Re-evaluation of all subjective and objective measures x 25'- billed as therex    Specific Instructions for next treatment: Progress active assistive ROM activities as tolerated followed by progressing RTC/scapular strengthening and stabilization as tolerated      Assessment: [x] Progressing toward goals:     [] No change. [] Other:    [x] Patient would continue to benefit from skilled physical therapy services in order to: restore full functional use and strength of L UE to PLOF    Pt has been seen for 10 PT visits to date, now 2 weeks s/p L shoulder CRISTOBAL. Overall, pt has made very good initial progress to date with regard to pain levels, AROM and strength in L shoulder with improvement in all objective measures today. PROM is full in L shoulder, but continues to be mildly limited and difficult actively due to weakness. Pain levels have been minimal aside from immediate post-exercise soreness and pt states that she has been able to utilize her L shoulder to do more overhead reaching at home.  Pt will continue to benefit from skilled PT intervention in order to restore full functional mobility and strength in L shoulder for unrestricted function at prior level. Updated POC extension to include continued therapy at 2x/week x 10 additional visits. HEP compliance has been excellent to date and is appropriate for a 2x/week frequency at this time. Shortened visit today secondary to pt needing to leave early for a work conflict. STG: (to be met in 8 treatments)  1. Pt will self report worst pain no greater than 2/10 in order to better tolerate ADLs with minimal dysfunction GOAL MET 3/3 (aside from brief period of post-exercise soreness at 4/10)  2. Pt will improve PROM of L shoulder to full symmetry (155 deg elevation, 60 deg extension and ER) in order to demonstrate successful CRISTOBAL and full available mobility in shoulder GOAL MET 3/3  LTG: (to be met in 10 treatments)  1. Pt will demonstrate improved L UE strength to 4/5 or greater in order to demonstrate improved stability/strength necessary for unrestricted ADLs/work activities GOAL PROGRESSING AND EXTENDED 3/3  2. Pt will improve AROM of L UE to full symmetry (155 deg elevation, 60 deg extension and ER, 75 deg IR, elbow flexion 120 deg) for full unrestricted reach in all planes GOAL PROGRESSING AND EXTENDED 3/3  3. Pt will demonstrate symmetrical Apley scratch test (cross body to posterior shoulder, ER to T2, IR to T9) for unrestricted dressing at [facility] 91 3/3  4. Pt will decrease SPADI to 10% impaired or less in order to demonstrate improved functional tolerances at PLOF with minimal restriction/dysfunction GOAL PROGRESSING AND EXTENDED 3/3  5. Pt will demonstrate independence with a long term HEP for continued progress/maintenance after completion of PT GOAL PROGRESSING AND EXTENDED 3/3       Pt. Education:  [x] Yes  [] No  [x] Reviewed Prior HEP/Ed  Method of Education: [x] Verbal  [x] Demo  [] Written  Comprehension of Education:   [x] Verbalizes understanding. [x] Demonstrates understanding. [] Needs review.   [x] Demonstrates/verbalizes HEP/Ed previously given. Plan: [x] Continue per plan of care. [] Other:       Treatment Plan:  [x] Therapeutic Exercise   56766  [] Iontophoresis: 4 mg/mL Dexamethasone Sodium Phosphate  mAmin  16529   [x] Therapeutic Activity  44645 [] Vasopneumatic cold with compression  21664    [] Gait Training   65617 [] Ultrasound   51451   [x] Neuromuscular Re-education  32171 [] Electrical Stimulation Unattended  07975   [x] Manual Therapy  72641 [] Electrical Stimulation Attended  67751   [x] Instruction in HEP  [] Lumbar/Cervical Traction  27408   [] Aquatic Therapy   00514 [] Cold/hotpack    [] Massage   48539      [] Dry Needling, 1 or 2 muscles  93641   [] Biofeedback, first 15 minutes   95067  [] Biofeedback, additional 15 minutes   82181 [] Dry Needling, 3 or more muscles  45573      Patient Status:     [] Continue per initial plan of care. [x] Additional visits necessary.     [] Other:     Requested Frequency/Duration: 2 times per week for 10 treatments (20 visits total)         Treatment Charges: Mins Units   []  Modalities     [x]  Ther Exercise 25 2   []  Manual Therapy     []  Ther Activities     []  Aquatics     []  Neuromuscular     [] Vasocompression     [] Gait Training     [] Dry needling        [] 1 or 2 muscles        [] 3 or more muscles     []  Other     Total Treatment time 25 2     Time In: 10:00am            Time Out: 10:25 am    Electronically signed by:  Gina Ro, PT

## 2021-03-04 ENCOUNTER — APPOINTMENT (OUTPATIENT)
Dept: PHYSICAL THERAPY | Age: 61
End: 2021-03-04
Payer: COMMERCIAL

## 2021-03-08 ENCOUNTER — APPOINTMENT (OUTPATIENT)
Dept: PHYSICAL THERAPY | Age: 61
End: 2021-03-08
Payer: COMMERCIAL

## 2021-03-08 RX ORDER — CHLORTHALIDONE 25 MG/1
TABLET ORAL
Qty: 90 TABLET | Refills: 1 | Status: SHIPPED | OUTPATIENT
Start: 2021-03-08 | End: 2021-11-16

## 2021-03-08 RX ORDER — ASPIRIN 81 MG/1
TABLET, FILM COATED ORAL
Qty: 30 TABLET | Refills: 2 | Status: SHIPPED | OUTPATIENT
Start: 2021-03-08 | End: 2021-07-26

## 2021-03-08 RX ORDER — CYCLOBENZAPRINE HCL 10 MG
TABLET ORAL
Qty: 30 TABLET | Refills: 0 | Status: SHIPPED | OUTPATIENT
Start: 2021-03-08 | End: 2021-04-06

## 2021-03-08 NOTE — TELEPHONE ENCOUNTER
E-scribe request for aspirin adult low strength, cyclobenzaprine, chlorthalidone  . Please review and e-scribe if applicable. Last Visit Date: 1/21/2021  Next Visit Date:  3/18/2021    Hemoglobin A1C (%)   Date Value   12/08/2020 7.9   08/04/2020 7.5   09/26/2019 7.4             ( goal A1C is < 7)   Microalb/Crt.  Ratio (mcg/mg creat)   Date Value   12/13/2019 CANNOT BE CALCULATED     LDL Cholesterol (mg/dL)   Date Value   08/04/2020 114       (goal LDL is <100)   AST (U/L)   Date Value   02/04/2021 18     ALT (U/L)   Date Value   02/04/2021 20     BUN (mg/dL)   Date Value   02/04/2021 25 (H)     BP Readings from Last 3 Encounters:   02/17/21 108/60   02/17/21 101/64   02/04/21 116/71          (goal 120/80)        Patient Active Problem List:     Cervical spondylosis     Type II or unspecified type diabetes mellitus without mention of complication, not stated as uncontrolled     Hypertension     Abnormal auditory perception     Eustachian tube dysfunction     Chronic serous otitis media     Nasal polyps     Nasal congestion     Osteoarthritis of left knee     Osteoarthritis of right knee     DIEGO (nonalcoholic steatohepatitis)     Vitamin D deficiency     Iron deficiency anemia     Dyslipidemia     Diabetes mellitus type 2, uncontrolled (HCC)     Left hip pain     Trochanteric bursitis     Fatigue     Daytime somnolence     Snoring     Chronic left shoulder pain     Restless legs syndrome     Generalized anxiety disorder     Primary osteoarthritis, left shoulder     Tendinopathy of left shoulder     Adhesive capsulitis of left shoulder

## 2021-03-09 ENCOUNTER — HOSPITAL ENCOUNTER (OUTPATIENT)
Dept: PHYSICAL THERAPY | Age: 61
Setting detail: THERAPIES SERIES
Discharge: HOME | End: 2021-03-09
Payer: COMMERCIAL

## 2021-03-09 PROCEDURE — 97110 THERAPEUTIC EXERCISES: CPT

## 2021-03-09 NOTE — FLOWSHEET NOTE
[address] [place] [address] Outpatient Physical Therapy   [address]   Phone: [phone]   Fax: [phone]    Physical Therapy Daily Treatment Note      Date:  3/9/2021  Patient Name:  Efra Boyle    :  1960  MRN: [id number]   Physician: Mariana Corea DO                        Insurance: Medical [place]. Visits BMN  Medical Diagnosis: M75.02 (ICD-10-CM) - Adhesive capsulitis of left shoulder      Rehab Codes: M75.02, R53.1, M25.561  Onset Date: 2020. CRISTOBAL 21                       Next 's appt: 21  Visit# / total visits:     Cancels/No Shows: 0/0    Subjective:    Pain:  [x] Yes  [] No Location:  L shoulder Pain Rating: (0-10 scale) 0/10  Pain altered Tx:  [x] No  [] Yes  Action:  Comments: 3/9: Pt states that she's doing better each week and that it's been getting easier to move her L shoulder. Denies any pain or discomfort currently at the start of today's visit. SPADI score (3/3): 31% impaired    Objective:     All below objective measures last assessed 3/3/21      Range of Motion  Left Range of Motion  Right Strength  Left Strength  Right   Shoulder Flexion 150 /160 155 3+/5 4/5   Shoulder Abduction 150 / 160   155 3+/5 4/5   Shoulder Extension 60 60     Shoulder ER 55 / 60 60 3+/5 4/5   Shoulder IR 75 75 4/5 4+/5   Elbow Flexion WNL  4+/5 4+/5   Elbow Extension 0  3+/5 4/5   Pronation WNL      Supination WNL      Wrist Flexion WNL      Wrist Extension WNL       --- ---            Apley- Cross Body Lateral shoulder Posterior shoulder --- ---   Pinto Presto ER T1 T2 --- ---   Apley- Gross IR Buttock T9 --- ---     Sensation: WNL with no numbness or paresthesia in L UE    Modalities:   Precautions: None  Exercises:  Exercise Reps/ Time Weight/ Level Comments Performed=x   Wand AAROM 20x ea  Flexion, ER, Abduction    Pulleys 2'  Abduction only    Standing wall slides 15 reps x 2  1 set flexion, 1 set scaption    Supine punches 15 reps x 2      Supine shoulder flexion  15 reps x 2 1# 1# DB for passive overpressure at end range x   Sidelying shoulder abduction 15 reps x 2 1# Weight of arm for passive overpressure at end range x   Supine D2 PNF 10 reps x 2  Light manual resistance through range x   Alternating isometrics and rhythmic stabilizations to L shoulder in supine 5'  RS challenging ER/IR at 0 deg  AI in all planes at 90 deg flex    UBE 6' L2.5 3' fwds/3' bwds x   Sidelying ER 15 reps x 3   x   Banded ER at 0 deg 10 reps x 2  Green tubing  x   Banded IR at 0 deg 15 reps x 2 Green tubing  x   Banded rows (low) 15 reps x 2 Green tubing     Banded shoulder flexion to 90 deg 10 reps x 2 Green tubing Bilateral    Banded shoulder extension 10 reps x 2 Green tubing Bilateral x          Other:     Specific Instructions for next treatment: Progress active assistive ROM activities as tolerated followed by progressing RTC/scapular strengthening and stabilization as tolerated      Assessment: [x] Progressing toward goals:     [] No change. [] Other:    [x] Patient would continue to benefit from skilled physical therapy services in order to: restore full functional use and strength of L UE to PLOF    3/9: No need for manual therapy today as pt reported 0/10 pain at start of today's visit and very minimal passive range restrictions. Improved comfort and neuromuscular control with resisted PNF patterns and was able to progress to concentric banded ER and IR at 0 deg with fatigue through all, but no significant pain. Progressing well towards goals. STG: (to be met in 8 treatments)  1. Pt will self report worst pain no greater than 2/10 in order to better tolerate ADLs with minimal dysfunction GOAL MET 3/3 (aside from brief period of post-exercise soreness at 4/10)  2.  Pt will improve PROM of L shoulder to full symmetry (155 deg elevation, 60 deg extension and ER) in order to demonstrate successful CRISTOBAL and full available mobility in shoulder GOAL MET 3/3  LTG: (to be met in 10 treatments)  1. Pt will demonstrate improved L UE strength to 4/5 or greater in order to demonstrate improved stability/strength necessary for unrestricted ADLs/work activities GOAL PROGRESSING AND EXTENDED 3/3  2. Pt will improve AROM of L UE to full symmetry (155 deg elevation, 60 deg extension and ER, 75 deg IR, elbow flexion 120 deg) for full unrestricted reach in all planes GOAL PROGRESSING AND EXTENDED 3/3  3. Pt will demonstrate symmetrical Apley scratch test (cross body to posterior shoulder, ER to T2, IR to T9) for unrestricted dressing at [place] [id number] 3/3  4. Pt will decrease SPADI to 10% impaired or less in order to demonstrate improved functional tolerances at [facility] with minimal restriction/dysfunction GOAL PROGRESSING AND EXTENDED 3/3  5. Pt will demonstrate independence with a long term HEP for continued progress/maintenance after completion of PT GOAL PROGRESSING AND EXTENDED 3/3       Pt. Education:  [x] Yes  [] No  [x] Reviewed Prior HEP/Ed  Method of Education: [x] Verbal  [x] Demo  [] Written  Comprehension of Education:   [x] Verbalizes understanding. [x] Demonstrates understanding. [] Needs review. [x] Demonstrates/verbalizes HEP/Ed previously given. Plan: [x] Continue per plan of care.    [] Other:         Treatment Charges: Mins Units   []  Modalities     [x]  Ther Exercise 40 3   []  Manual Therapy     []  Ther Activities     []  Aquatics     []  Neuromuscular     [] Vasocompression     [] Gait Training     [] Dry needling        [] 1 or 2 muscles        [] 3 or more muscles     []  Other     Total Treatment time 40 3     Time In: 10:00am            Time Out: 10:40 am    Electronically signed by:  Tory Petersen PT

## 2021-03-10 ENCOUNTER — APPOINTMENT (OUTPATIENT)
Dept: PHYSICAL THERAPY | Age: 61
End: 2021-03-10
Payer: COMMERCIAL

## 2021-03-11 ENCOUNTER — HOSPITAL ENCOUNTER (OUTPATIENT)
Dept: PHYSICAL THERAPY | Age: 61
Setting detail: THERAPIES SERIES
Discharge: HOME | End: 2021-03-11
Payer: COMMERCIAL

## 2021-03-11 PROCEDURE — 97110 THERAPEUTIC EXERCISES: CPT

## 2021-03-11 PROCEDURE — 97140 MANUAL THERAPY 1/> REGIONS: CPT

## 2021-03-11 NOTE — FLOWSHEET NOTE
[address] Outpatient Physical Therapy   [address]   Phone: [phone]   Fax: [phone]    Physical Therapy Daily Treatment Note      Date:  3/11/2021  Patient Name:  Jemal Han    :  1960  MRN: [id number]   Physician: Han Hutchinson DO                        Insurance: Medical [place]. Visits BMN  Medical Diagnosis: M75.02 (ICD-10-CM) - Adhesive capsulitis of left shoulder      Rehab Codes: M75.02, R53.1, M25.561  Onset Date: 2020. CRISTOBAL 21                       Next 's appt: 21  Visit# / total visits:     Cancels/No Shows: 0/0    Subjective:    Pain:  [x] Yes  [] No Location:  L shoulder Pain Rating: (0-10 scale) 4/10  Pain altered Tx:  [x] No  [] Yes  Action:  Comments: 3/11: Pt reports that her shoulder is not doing so well today. Feels stiffer and more sore than usual which she attributes to working until 10pm last night and not getting a chance to do her stretches yesterday. SPADI score (3/3): 31% impaired    Objective:     All below objective measures last assessed 3/3/21      Range of Motion  Left Range of Motion  Right Strength  Left Strength  Right   Shoulder Flexion 150 /160 155 3+/5 4/5   Shoulder Abduction 150 / 160   155 3+/5 4/5   Shoulder Extension 60 60     Shoulder ER 55 / 60 60 3+/5 4/5   Shoulder IR 75 75 4/5 4+/5   Elbow Flexion WNL  4+/5 4+/5   Elbow Extension 0  3+/5 4/5   Pronation WNL      Supination WNL      Wrist Flexion WNL      Wrist Extension WNL       --- ---            Apley- Cross Body Lateral shoulder Posterior shoulder --- ---   Conor Hannahe- Gross ER T1 T2 --- ---   Apley- Gross IR Buttock T9 --- ---     Sensation: WNL with no numbness or paresthesia in L UE    Modalities:   Precautions: None  Exercises:  Exercise Reps/ Time Weight/ Level Comments Performed=x   Wand AAROM 20x ea  Flexion, ER, Abduction    Pulleys 20 reps   Flexion and abduction each x   Standing wall slides 15 reps x 2  1 set flexion, 1 set scaption    Supine punches 15 reps x 2      Supine shoulder flexion  15 reps x 2 1# 1# DB for passive overpressure at end range    Sidelying shoulder abduction 15 reps x 2 1# Weight of arm for passive overpressure at end range x   Supine D2 PNF 10 reps x 2  Light manual resistance through range x   Alternating isometrics and rhythmic stabilizations to L shoulder in supine 5'  RS challenging ER/IR at 0 deg  AI in all planes at 90 deg flex    UBE 6' L2.5 3' fwds/3' bwds x   Sidelying ER 15 reps x 3   x   Banded ER at 0 deg 10 reps x 2  Green tubing     Banded IR at 0 deg 15 reps x 2 Green tubing     Banded rows (low) 15 reps x 2 Green tubing     Banded shoulder flexion to 90 deg 10 reps x 2 Green tubing Bilateral    Banded shoulder extension 10 reps x 2 Green tubing Bilateral           Other: Manual therapy x 10'  -STM/MFR to R anterolateral deltoid and biceps  -Grade III/IV inferior and posterior GHJ glides to L shoulder    Specific Instructions for next treatment: Progress active assistive ROM activities as tolerated followed by progressing RTC/scapular strengthening and stabilization as tolerated      Assessment: [x] Progressing toward goals:     [] No change. [] Other:    [x] Patient would continue to benefit from skilled physical therapy services in order to: restore full functional use and strength of L UE to PLOF    3/11: Increase pain levels and stiffness compared to previous visit, likely as a result of returning to work and working extremely late last night with no chance to do HEP. Notable tenderness, tightness and trigger points along L anterolateral deltoid and biceps, as well as global stiffness around GHJ. Revisited manual interventions to address mobility limitations today with soreness but overall good tolerance. Banded strengthening activities deferred to next visit. STG: (to be met in 8 treatments)  1.  Pt will self report worst pain no greater than 2/10 in order to better tolerate ADLs with minimal dysfunction GOAL MET 3/3 (aside from brief period of post-exercise soreness at 4/10)  2. Pt will improve PROM of L shoulder to full symmetry (155 deg elevation, 60 deg extension and ER) in order to demonstrate successful CRISTOBAL and full available mobility in shoulder GOAL MET 3/3  LTG: (to be met in 10 treatments)  1. Pt will demonstrate improved L UE strength to 4/5 or greater in order to demonstrate improved stability/strength necessary for unrestricted ADLs/work activities GOAL PROGRESSING AND EXTENDED 3/3  2. Pt will improve AROM of L UE to full symmetry (155 deg elevation, 60 deg extension and ER, 75 deg IR, elbow flexion 120 deg) for full unrestricted reach in all planes GOAL PROGRESSING AND EXTENDED 3/3  3. Pt will demonstrate symmetrical Apley scratch test (cross body to posterior shoulder, ER to T2, IR to T9) for unrestricted dressing at [facility] 3/3  4. Pt will decrease SPADI to 10% impaired or less in order to demonstrate improved functional tolerances at [facility] with minimal restriction/dysfunction GOAL PROGRESSING AND EXTENDED 3/3  5. Pt will demonstrate independence with a long term HEP for continued progress/maintenance after completion of PT GOAL PROGRESSING AND EXTENDED 3/3       Pt. Education:  [x] Yes  [] No  [x] Reviewed Prior HEP/Ed  Method of Education: [x] Verbal  [x] Demo  [] Written  Comprehension of Education:   [x] Verbalizes understanding. [x] Demonstrates understanding. [] Needs review. [x] Demonstrates/verbalizes HEP/Ed previously given. Plan: [x] Continue per plan of care.    [] Other:         Treatment Charges: Mins Units   []  Modalities     [x]  Ther Exercise 30 2   [x]  Manual Therapy 10 1   []  Ther Activities     []  Aquatics     []  Neuromuscular     [] Vasocompression     [] Gait Training     [] Dry needling        [] 1 or 2 muscles        [] 3 or more muscles     []  Other     Total Treatment time 40 3     Time In: 8:45am            Time

## 2021-03-16 ENCOUNTER — HOSPITAL ENCOUNTER (OUTPATIENT)
Dept: PHYSICAL THERAPY | Age: 61
Setting detail: THERAPIES SERIES
Discharge: HOME | End: 2021-03-16
Payer: COMMERCIAL

## 2021-03-16 PROCEDURE — 97110 THERAPEUTIC EXERCISES: CPT

## 2021-03-16 NOTE — FLOWSHEET NOTE
reps x 2 1# 1# DB for passive overpressure at end range    Sidelying shoulder abduction 15 reps x 2 1# Weight of arm for passive overpressure at end range x   Supine D2 PNF 10 reps x 2  Light manual resistance through range x   Alternating isometrics and rhythmic stabilizations to L shoulder in supine 5'  RS challenging ER/IR at 0 deg  AI in all planes at 90 deg flex    UBE 6' L2.5 3' fwds/3' bwds x   Sidelying ER 10 reps x 3 1#  x   Scapular push ups (standing in modified plank) 10 reps x 2  Hands elevated at high low mat (low thigh height) x   Standing OH lift offs from wall 20 reps x 2   x   Ball on wall 20 reps ea x 2 Volleyball Up/down, side/side, circles x   Banded ER at 0 deg 10 reps x 2  Green tubing  x   Banded IR at 0 deg 15 reps x 2 Green tubing  x   Banded rows (low) 15 reps x 2 Green tubing     Banded shoulder flexion to 90 deg 10 reps x 2 Green tubing Bilateral    Banded shoulder extension 10 reps x 2 Green tubing Bilateral           Other:     Specific Instructions for next treatment: Progress active assistive ROM activities as tolerated followed by progressing RTC/scapular strengthening and stabilization as tolerated      Assessment: [x] Progressing toward goals:     [] No change. [] Other:    [x] Patient would continue to benefit from skilled physical therapy services in order to: restore full functional use and strength of L UE to PLOF    3/16: Improved soreness compared to previous visit with minimal ROM restrictions today and no need for manual intervention. Progressed overhead mobility exercises and standing stabilization activities including OH lift offs from wall, scapular push ups, and ball on wall with notable fatigue through all interventions but no specific pain. Verbal and tactile cues required for technique, especially with new addition of scapular push ups for serratus anterior activation, with improved neuromuscular control afterwards. STG: (to be met in 8 treatments)  1.  Pt []  Other     Total Treatment time 39 3     Time In: 8:18am            Time Out: 8:57am    Electronically signed by:  Catrina Quintana PT

## 2021-03-18 ENCOUNTER — HOSPITAL ENCOUNTER (OUTPATIENT)
Dept: PHYSICAL THERAPY | Age: 61
Setting detail: THERAPIES SERIES
Discharge: HOME | End: 2021-03-18
Payer: COMMERCIAL

## 2021-03-18 DIAGNOSIS — E11.9 TYPE 2 DIABETES MELLITUS WITHOUT COMPLICATION, WITHOUT LONG-TERM CURRENT USE OF INSULIN (HCC): ICD-10-CM

## 2021-03-18 PROCEDURE — 97110 THERAPEUTIC EXERCISES: CPT

## 2021-03-18 RX ORDER — DULAGLUTIDE 1.5 MG/.5ML
INJECTION, SOLUTION SUBCUTANEOUS
Qty: 2 PEN | Refills: 2 | Status: SHIPPED | OUTPATIENT
Start: 2021-03-18 | End: 2021-05-05

## 2021-03-18 NOTE — TELEPHONE ENCOUNTER
trulicity pending for refill     Health Maintenance   Topic Date Due    Shingles Vaccine (1 of 2) Never done    Diabetic retinal exam  01/12/2017    Diabetic microalbuminuria test  12/13/2020    DTaP/Tdap/Td vaccine (2 - Tdap) 12/15/2020    Diabetic foot exam  11/12/2021 (Originally 12/1/2017)    Lipid screen  08/04/2021    A1C test (Diabetic or Prediabetic)  12/08/2021    Potassium monitoring  02/04/2022    Creatinine monitoring  02/04/2022    Colon cancer screen colonoscopy  09/20/2022    Breast cancer screen  11/14/2022    Flu vaccine  Completed    Pneumococcal 0-64 years Vaccine  Completed    COVID-19 Vaccine  Completed    Hepatitis C screen  Completed    HIV screen  Completed    Hepatitis A vaccine  Aged Out    Hib vaccine  Aged Out    Meningococcal (ACWY) vaccine  Aged Out             (applicable per patient's age: Cancer Screenings, Depression Screening, Fall Risk Screening, Immunizations)    Hemoglobin A1C (%)   Date Value   12/08/2020 7.9   08/04/2020 7.5   09/26/2019 7.4     Microalb/Crt.  Ratio (mcg/mg creat)   Date Value   12/13/2019 CANNOT BE CALCULATED     LDL Cholesterol (mg/dL)   Date Value   08/04/2020 114     AST (U/L)   Date Value   02/04/2021 18     ALT (U/L)   Date Value   02/04/2021 20     BUN (mg/dL)   Date Value   02/04/2021 25 (H)      (goal A1C is < 7)   (goal LDL is <100) need 30-50% reduction from baseline     BP Readings from Last 3 Encounters:   02/17/21 108/60   02/17/21 101/64   02/04/21 116/71    (goal /80)      All Future Testing planned in [facility]:  Lab Frequency Next Occurrence   Microalbumin, Ur Once 12/08/2021   EKG 12 Lead Once 02/09/2021   COVID-19 Once 02/12/2021       Next Visit Date:  Future Appointments   Date Time Provider Nicola Zaidi   3/23/2021  8:15 AM Marah Mote, PT [facility] [place]   3/25/2021  9:45 AM Marah Kamilahe, PT [facility]   3/30/2021  8:15 AM Marah Kamilahe, PT [facility]   4/1/2021  9:45 AM Olive Madrigal, PT [facility] [place]   4/6/2021  8:15 AM Olive Madrigal, PT [facility] [place]   4/8/2021  8:15 AM Olive Madrigal, PT [facility] [place]   4/20/2021  9:00 [facility]   6/9/2021  8:30 AM [facility]            Patient Active Problem List:     Cervical spondylosis     Type II or unspecified type diabetes mellitus without mention of complication, not stated as uncontrolled     Hypertension     Abnormal auditory perception     Eustachian tube dysfunction     Chronic serous otitis media     Nasal polyps     Nasal congestion     Osteoarthritis of left knee     Osteoarthritis of right knee     DIEGO (nonalcoholic steatohepatitis)     Vitamin D deficiency     Iron deficiency anemia     Dyslipidemia     Diabetes mellitus type 2, uncontrolled (HCC)     Left hip pain     Trochanteric bursitis     Fatigue     Daytime somnolence     Snoring     Chronic left shoulder pain     Restless legs syndrome     Generalized anxiety disorder     Primary osteoarthritis, left shoulder     Tendinopathy of left shoulder     Adhesive capsulitis of left shoulder

## 2021-03-18 NOTE — FLOWSHEET NOTE
[facility] Outpatient Physical Therapy   [address]   Phone: [phone]   Fax: [phone]    Physical Therapy Daily Treatment Note      Date:  3/18/2021  Patient Name:  Rosa Mccall    :  1960  MRN: [id number]   Physician: Keiko White DO                        Insurance: [facility] [place]. Visits BMN  Medical Diagnosis: M75.02 (ICD-10-CM) - Adhesive capsulitis of left shoulder      Rehab Codes: M75.02, R53.1, M25.561  Onset Date: 2020. CRISTOBAL 21                       Next 's appt: 21  Visit# / total visits:     Cancels/No Shows: 0/0    Subjective:    Pain:  [x] Yes  [] No Location:  L shoulder Pain Rating: (0-10 scale) 4/10  Pain altered Tx:  [x] No  [] Yes  Action:  Comments: 3/18: Pt states that she's doing well with no complaints currently. Continues to verbalize good compliance with home program.     SPADI score (3/3): 31% impaired    Objective:     All below objective measures last assessed 3/3/21      Range of Motion  Left Range of Motion  Right Strength  Left Strength  Right   Shoulder Flexion 150 /160 155 3+/5 4/5   Shoulder Abduction 150 / 160   155 3+/5 4/5   Shoulder Extension 60 60     Shoulder ER 55 / 60 60 3+/5 4/5   Shoulder IR 75 75 4/5 4+/5   Elbow Flexion WNL  4+/5 4+/5   Elbow Extension 0  3+/5 4/5   Pronation WNL      Supination WNL      Wrist Flexion WNL      Wrist Extension WNL       --- ---            Apley- Cross Body Lateral shoulder Posterior shoulder --- ---   Cleatrice Niece- Gross ER T1 T2 --- ---   Apley- Gross IR Buttock T9 --- ---     Sensation: WNL with no numbness or paresthesia in L UE    Modalities:   Precautions: None  Exercises:  Exercise Reps/ Time Weight/ Level Comments Performed=x   Wand AAROM 20x ea  Flexion, ER, Abduction    Pulleys 20 reps   Flexion and abduction each    Standing wall slides 15 reps x 2  1 set flexion, 1 set scaption    Supine punches 15 reps x 3 2# L only x   Supine shoulder flexion  15 reps x 2 1# 1# DB for passive overpressure at end range    Sidelying shoulder abduction 15 reps x 2 1# Weight of arm for passive overpressure at end range    Supine D2 PNF 10 reps x 3  Light manual resistance through range x   Alternating isometrics and rhythmic stabilizations to L shoulder in supine 5'  RS challenging ER/IR at 0 deg  AI in all planes at 90 deg flex    UBE 6' L2.5 3' fwds/3' bwds x   Sidelying ER 10 reps x 3 2#  x   Scapular push ups (standing in modified plank) 10 reps x 2  Hands elevated at high low mat (low thigh height)    Standing OH lift offs from wall 15 reps x 3 Orange  x   Ball on wall 20 reps ea x 4 Volleyball Up/down, side/side, circles  2 sets flexion, 2 sets abduction x   Banded ER at 0 deg 10 reps x 2  Green tubing  x   Banded IR at 0 deg 15 reps x 2 Green tubing  x   Banded rows (low) 20 reps x 2 Green tubing  x   Banded shoulder flexion to 90 deg 10 reps x 2 Green tubing Bilateral    Banded shoulder extension 15 reps x 2 Green tubing Bilateral x          Other:     Specific Instructions for next treatment: Progress active assistive ROM activities as tolerated followed by progressing RTC/scapular strengthening and stabilization as tolerated      Assessment: [x] Progressing toward goals:     [] No change. [] Other:    [x] Patient would continue to benefit from skilled physical therapy services in order to: restore full functional use and strength of L UE to PLOF    3/18: Continued exercises per chart with added resistance through all activities for progressive strengthening. Fatigue with all interventions but is able to access full overhead range with less strain. Good progress towards goals each week and anticipate that pt will likely be ready to successfully transition to an independent HEP in ~4 visits. STG: (to be met in 8 treatments)  1.  Pt will self report worst pain no greater than 2/10 in order to better tolerate ADLs with minimal dysfunction GOAL MET 3/3 (aside from brief period of post-exercise soreness at 4/10)  2. Pt will improve PROM of L shoulder to full symmetry (155 deg elevation, 60 deg extension and ER) in order to demonstrate successful CRISTOBAL and full available mobility in shoulder GOAL MET 3/3  LTG: (to be met in 10 treatments)  1. Pt will demonstrate improved L UE strength to 4/5 or greater in order to demonstrate improved stability/strength necessary for unrestricted ADLs/work activities GOAL PROGRESSING AND EXTENDED 3/3  2. Pt will improve AROM of L UE to full symmetry (155 deg elevation, 60 deg extension and ER, 75 deg IR, elbow flexion 120 deg) for full unrestricted reach in all planes GOAL PROGRESSING AND EXTENDED 3/3  3. Pt will demonstrate symmetrical Apley scratch test (cross body to posterior shoulder, ER to T2, IR to T9) for unrestricted dressing at [place] [address] 3/3  4. Pt will decrease SPADI to 10% impaired or less in order to demonstrate improved functional tolerances at PLOF with minimal restriction/dysfunction GOAL PROGRESSING AND EXTENDED 3/3  5. Pt will demonstrate independence with a long term HEP for continued progress/maintenance after completion of PT GOAL PROGRESSING AND EXTENDED 3/3       Pt. Education:  [x] Yes  [] No  [x] Reviewed Prior HEP/Ed  Method of Education: [x] Verbal  [x] Demo  [] Written  Comprehension of Education:   [x] Verbalizes understanding. [x] Demonstrates understanding. [] Needs review. [x] Demonstrates/verbalizes HEP/Ed previously given. Plan: [x] Continue per plan of care.    [] Other:         Treatment Charges: Mins Units   []  Modalities     [x]  Ther Exercise 40 3   []  Manual Therapy     []  Ther Activities     []  Aquatics     []  Neuromuscular     [] Vasocompression     [] Gait Training     [] Dry needling        [] 1 or 2 muscles        [] 3 or more muscles     []  Other     Total Treatment time 40 3     Time In: 9:45am            Time Out: 10:25am    Electronically signed by:  Lakisha Chen Geiss, PT

## 2021-03-21 DIAGNOSIS — E78.5 DYSLIPIDEMIA: ICD-10-CM

## 2021-03-22 RX ORDER — ROSUVASTATIN CALCIUM 20 MG/1
20 TABLET, COATED ORAL NIGHTLY
Qty: 30 TABLET | Refills: 2 | Status: SHIPPED | OUTPATIENT
Start: 2021-03-22 | End: 2021-09-13

## 2021-03-22 NOTE — TELEPHONE ENCOUNTER
Trang Request for pending medication. Last Visit Date: 1/21/21  Next Visit Date:  Future Appointments   Date Time Provider Nicola Zaidi   3/23/2021  8:15 AM Elliott Laming, PT [facility]   3/25/2021  9:45 AM Elliott Laming, PT [facility]   3/30/2021  8:15 AM Elliott Laming, PT [facility]   4/1/2021  9:45 AM Elliott Laming, PT [facility]   4/6/2021  8:15 AM Elliott Laming, PT [facility]   4/8/2021  8:15 AM Elliott Laming, PT [facility]   4/20/2021  9:00 AM MD Paulina Mei  [email or web address]   6/9/2021  8:30 AM DO Sola Alexandra Brandyn [address] Maintenance   Topic Date Due    Shingles Vaccine (1 of 2) Never done    Diabetic retinal exam  01/12/2017    Diabetic microalbuminuria test  12/13/2020    DTaP/Tdap/Td vaccine (2 - Tdap) 12/15/2020    Diabetic foot exam  11/12/2021 (Originally 12/1/2017)    Lipid screen  08/04/2021    A1C test (Diabetic or Prediabetic)  12/08/2021    Potassium monitoring  02/04/2022    Creatinine monitoring  02/04/2022    Colon cancer screen colonoscopy  09/20/2022    Breast cancer screen  11/14/2022    Flu vaccine  Completed    Pneumococcal 0-64 years Vaccine  Completed    COVID-19 Vaccine  Completed    Hepatitis C screen  Completed    HIV screen  Completed    Hepatitis A vaccine  Aged Out    Hib vaccine  Aged Out    Meningococcal (ACWY) vaccine  Aged Out       Hemoglobin A1C (%)   Date Value   12/08/2020 7.9   08/04/2020 7.5   09/26/2019 7.4             ( goal A1C is < 7)   Microalb/Crt.  Ratio (mcg/mg creat)   Date Value   12/13/2019 CANNOT BE CALCULATED     LDL Cholesterol (mg/dL)   Date Value   08/04/2020 114       (goal LDL is <100)   AST (U/L)   Date Value   02/04/2021 18     ALT (U/L)   Date Value   02/04/2021 20     BUN (mg/dL)   Date Value   02/04/2021 25 (H)     BP Readings from Last 3 Encounters:   02/17/21 108/60   02/17/21 101/64   02/04/21 116/71          (goal 120/80)    All Future Testing planned in [facility]  Lab Frequency Next Occurrence   Microalbumin, Ur Once 12/08/2021   EKG 12 Lead Once 02/09/2021   COVID-19 Once 02/12/2021       Next Visit Date:  Future Appointments   Date Time Provider Nicola Dyan   3/23/2021  8:15 AM Merian [place], PT [facility] [place]   3/25/2021  9:45 AM Merian [place], PT [facility] [place]   3/30/2021  8:15 AM Merian [place], PT [facility] [place]   4/1/2021  9:45 AM Merian [place], PT [facility] [place]   4/6/2021  8:15 AM Merian [place], PT [facility] [place]   4/8/2021  8:15 AM Merian [place], PT [facility] [place]   4/20/2021  9:00 AM [facility]ones [facility]   6/9/2021  8:30 AM Ahs[facility] Carbajal DO [facility]         Patient Active Problem List:     Cervical spondylosis     Type II or unspecified type diabetes mellitus without mention of complication, not stated as uncontrolled     Hypertension     Abnormal auditory perception     Eustachian tube dysfunction     Chronic serous otitis media     Nasal polyps     Nasal congestion     Osteoarthritis of left knee     Osteoarthritis of right knee     DIEGO (nonalcoholic steatohepatitis)     Vitamin D deficiency     Iron deficiency anemia     Dyslipidemia     Diabetes mellitus type 2, uncontrolled (HCC)     Left hip pain     Trochanteric bursitis     Fatigue     Daytime somnolence     Snoring     Chronic left shoulder pain     Restless legs syndrome     Generalized anxiety disorder     Primary osteoarthritis, left shoulder     Tendinopathy of left shoulder     Adhesive capsulitis of left shoulder

## 2021-03-23 ENCOUNTER — HOSPITAL ENCOUNTER (OUTPATIENT)
Dept: PHYSICAL THERAPY | Age: 61
Setting detail: THERAPIES SERIES
Discharge: HOME | End: 2021-03-23
Payer: COMMERCIAL

## 2021-03-23 PROCEDURE — 97110 THERAPEUTIC EXERCISES: CPT

## 2021-03-23 NOTE — FLOWSHEET NOTE
[facility]   [address]   Phone: [phone]   Fax: [phone]    Physical Therapy Daily Treatment Note      Date:  3/23/2021  Patient Name:  Damien Francisco    :  1960  MRN: [id number]   Physician: Will Clemente DO                        Insurance: Medical [place]. Visits BMN  Medical Diagnosis: M75.02 (ICD-10-CM) - Adhesive capsulitis of left shoulder      Rehab Codes: M75.02, R53.1, M25.561  Onset Date: 2020. CRISTOBAL 21                       Next 's appt: 21  Visit# / total visits: 15/20    Cancels/No Shows: 0/0    Subjective:    Pain:  [x] Yes  [] No Location:  L shoulder Pain Rating: (0-10 scale) 4/10  Pain altered Tx:  [x] No  [] Yes  Action:  Comments: 3/23: Pt states that she's feeling pretty well with no significant pain at start of session, just mild achiness currently. Continues to report good compliance with HEP to date. SPADI score (3/3): 31% impaired    Objective:     All below objective measures last assessed 3/3/21      Range of Motion  Left Range of Motion  Right Strength  Left Strength  Right   Shoulder Flexion 150 /160 155 3+/5 4/5   Shoulder Abduction 150 / 160   155 3+/5 4/5   Shoulder Extension 60 60     Shoulder ER 55 / 60 60 3+/5 4/5   Shoulder IR 75 75 4/5 4+/5   Elbow Flexion WNL  4+/5 4+/5   Elbow Extension 0  3+/5 4/5   Pronation WNL      Supination WNL      Wrist Flexion WNL      Wrist Extension WNL       --- ---            Apley- Cross Body Lateral shoulder Posterior shoulder --- ---   Jinny Reyez- Gross ER T1 T2 --- ---   Apley- Gross IR Buttock T9 --- ---     Sensation: WNL with no numbness or paresthesia in L UE    Modalities:   Precautions: None  Exercises:  Exercise Reps/ Time Weight/ Level Comments Performed=x   Wand AAROM 20x ea  Flexion, ER, Abduction    Pulleys 20 reps   Flexion and abduction each    Standing wall slides 15 reps x 2  1 set flexion, 1 set scaption    Supine punches 20 reps x 2 2# L only x   Supine shoulder flexion  15 reps x 2 1# 1# DB for passive overpressure at end range    Sidelying shoulder abduction 15 reps x 2 1# Weight of arm for passive overpressure at end range    Supine D2 PNF 15 reps x 2  Moderate manual resistance through range x   Alternating isometrics and rhythmic stabilizations to L shoulder in supine 5'  RS challenging ER/IR at 0 deg  AI in all planes at 90 deg flex    UBE 6' L2.5 3' fwds/3' bwds x   Sidelying ER 10 reps x 3 2#     Scapular push ups (standing in modified plank) 10 reps x 2  Hands elevated at high low mat (low thigh height)    Standing OH lift offs from wall 20 reps x 2 Orange L/R alternating each rep x   Towel assisted IR stretch behind back 5'' hold x 15   x   Ball on wall 20 reps ea x 4 Volleyball Up/down, side/side, circles  2 sets flexion, 2 sets abduction x   Banded ER at 0 deg 10 reps x 2  Green tubing  x   Banded IR at 0 deg 15 reps x 2 Green tubing  x   Banded rows (low) 20 reps x 2 Green tubing     Banded shoulder flexion to 90 deg 10 reps x 2 Green tubing Bilateral    Banded shoulder extension 15 reps x 2 Green tubing Bilateral    Banded horizontal abduction 10 reps x 2 Orange Bilateral in standing x   Other:     Specific Instructions for next treatment: Progress active assistive ROM activities as tolerated followed by progressing RTC/scapular strengthening and stabilization as tolerated      Assessment: [x] Progressing toward goals:     [] No change. [] Other:    [x] Patient would continue to benefit from skilled physical therapy services in order to: restore full functional use and strength of L UE to PLOF    3/23: Initiated towel assisted IR mobility today as behind the back continues to be limited on L compared to R. Moderate soreness through all additions today but otherwise good effort to complete.  Continues to fatigue quickly with banded strengthening exercises but neuromuscular control and scapulohumeral rhythm is consistently progressing with overhead reach. STG: (to be met in 8 treatments)  1. Pt will self report worst pain no greater than 2/10 in order to better tolerate ADLs with minimal dysfunction GOAL MET 3/3 (aside from brief period of post-exercise soreness at 4/10)  2. Pt will improve PROM of L shoulder to full symmetry (155 deg elevation, 60 deg extension and ER) in order to demonstrate successful CRISTOBAL and full available mobility in shoulder GOAL MET 3/3  LTG: (to be met in 10 treatments)  1. Pt will demonstrate improved L UE strength to 4/5 or greater in order to demonstrate improved stability/strength necessary for unrestricted ADLs/work activities GOAL PROGRESSING AND EXTENDED 3/3  2. Pt will improve AROM of L UE to full symmetry (155 deg elevation, 60 deg extension and ER, 75 deg IR, elbow flexion 120 deg) for full unrestricted reach in all planes GOAL PROGRESSING AND EXTENDED 3/3  3. Pt will demonstrate symmetrical Apley scratch test (cross body to posterior shoulder, ER to T2, IR to T9) for unrestricted dressing at [place] [address] 3/3  4. Pt will decrease SPADI to 10% impaired or less in order to demonstrate improved functional tolerances at OF with minimal restriction/dysfunction GOAL PROGRESSING AND EXTENDED 3/3  5. Pt will demonstrate independence with a long term HEP for continued progress/maintenance after completion of PT GOAL PROGRESSING AND EXTENDED 3/3       Pt. Education:  [x] Yes  [] No  [x] Reviewed Prior HEP/Ed  Method of Education: [x] Verbal  [x] Demo  [] Written  Comprehension of Education:   [x] Verbalizes understanding. [x] Demonstrates understanding. [] Needs review. [x] Demonstrates/verbalizes HEP/Ed previously given. Plan: [x] Continue per plan of care.    [] Other:         Treatment Charges: Mins Units   []  Modalities     [x]  Ther Exercise 39 3   []  Manual Therapy     []  Ther Activities     []  Aquatics     []  Neuromuscular     [] Vasocompression     [] Gait Training     [] Dry needling        [] 1 or 2 muscles        [] 3 or more muscles     []  Other     Total Treatment time 39 3     Time In: 8:15am            Time Out: 8:54am    Electronically signed by:  Nohelia Garcia PT

## 2021-03-25 ENCOUNTER — HOSPITAL ENCOUNTER (OUTPATIENT)
Dept: PHYSICAL THERAPY | Age: 61
Setting detail: THERAPIES SERIES
Discharge: HOME | End: 2021-03-25
Payer: COMMERCIAL

## 2021-03-25 PROCEDURE — 97110 THERAPEUTIC EXERCISES: CPT

## 2021-03-25 PROCEDURE — 97140 MANUAL THERAPY 1/> REGIONS: CPT

## 2021-03-25 NOTE — FLOWSHEET NOTE
[facility]   [address]   Phone: [phone]   Fax: [phone]    Physical Therapy Daily Treatment Note      Date:  3/25/2021  Patient Name:  Melo Joya    :  1960  MRN: [id number]   Physician: Carma Frankel, DO                        Insurance: Medical [place]. Visits BMN  Medical Diagnosis: M75.02 (ICD-10-CM) - Adhesive capsulitis of left shoulder      Rehab Codes: M75.02, R53.1, M25.561  Onset Date: 2020. CRISTOBAL 21                       Next 's appt: 21  Visit# / total visits:     Cancels/No Shows: 0/0    Subjective:    Pain:  [x] Yes  [] No Location:  L shoulder Pain Rating: (0-10 scale) 4/10  Pain altered Tx:  [x] No  [] Yes  Action:  Comments: 3/25: Pt states that she was very sore after her previous visit with the towel assisted IR stretch. Feels slightly better today but still mildly painful, reporting sx currently at 3/10. SPADI score (3/3): 31% impaired    Objective:     All below objective measures last assessed 3/3/21      Range of Motion  Left Range of Motion  Right Strength  Left Strength  Right   Shoulder Flexion 150 /160 155 3+/5 4/5   Shoulder Abduction 150 / 160   155 3+/5 4/5   Shoulder Extension 60 60     Shoulder ER 55 / 60 60 3+/5 4/5   Shoulder IR 75 75 4/5 4+/5   Elbow Flexion WNL  4+/5 4+/5   Elbow Extension 0  3+/5 4/5   Pronation WNL      Supination WNL      Wrist Flexion WNL      Wrist Extension WNL       --- ---            Apley- Cross Body Lateral shoulder Posterior shoulder --- ---   [employer]- Gross ER T1 T2 --- ---   Apley- Gross IR Buttock T9 --- ---     Sensation: WNL with no numbness or paresthesia in L UE    Modalities:   Precautions: None  Exercises:  Exercise Reps/ Time Weight/ Level Comments Performed=x   Wand AAROM 20x ea  Flexion, ER, Abduction    Pulleys 20 reps   Flexion and abduction each    Standing wall slides 15 reps x 2  1 set flexion, 1 set scaption    Supine punches 20 reps x 2 2# L only x   Supine shoulder flexion  15 reps x 2 1# 1# DB for passive overpressure at end range x   Sidelying shoulder abduction 15 reps x 2  Weight of arm for passive overpressure at end range x   Supine D2 PNF 15 reps x 2  Moderate manual resistance through range    Alternating isometrics and rhythmic stabilizations to L shoulder in supine 5'  RS challenging ER/IR at 0 deg  AI in all planes at 90 deg flex    UBE 6' L2.5 3' fwds/3' bwds x   Sidelying ER 10 reps x 3 No weight today  x   Scapular push ups (standing in modified plank) 10 reps x 2  Hands elevated at high low mat (low thigh height)    Standing OH lift offs from wall 20 reps x 2 Orange L/R alternating each rep    Towel assisted IR stretch behind back 5'' hold x 15      Ball on wall 20 reps ea x 4 Volleyball Up/down, side/side, circles  2 sets flexion, 2 sets abduction    Banded ER at 0 deg 10 reps x 2  Green tubing     Banded IR at 0 deg 15 reps x 2 Green tubing     Banded rows (low) 20 reps x 2 Green tubing  x   Banded shoulder flexion to 90 deg 10 reps x 2 Green tubing Bilateral    Banded shoulder extension 15 reps x 2 Green tubing Bilateral x   Banded horizontal abduction 10 reps x 2 Orange Bilateral in standing    Other: Manual therapy x 10'  -MFR to L lateral deltoid and proximal biceps  -Grade III L GHJ glides (inferior and posterior)  -PROM to L shoulder in all planes    Specific Instructions for next treatment: Progress active assistive ROM activities as tolerated followed by progressing RTC/scapular strengthening and stabilization as tolerated      Assessment: [x] Progressing toward goals:     [] No change. [] Other:    [x] Patient would continue to benefit from skilled physical therapy services in order to: restore full functional use and strength of L UE to PLOF    3/25: Elevated pain levels today and increased stiffness compared to previous visit with manual therapy required to restore functional range today. Treatment interventions modified to minimize excessive pain, with higher level strengthening/stabilization activities deferred to next visit. Improved sx post session from 3/10 to 1/10. STG: (to be met in 8 treatments)  1. Pt will self report worst pain no greater than 2/10 in order to better tolerate ADLs with minimal dysfunction GOAL MET 3/3 (aside from brief period of post-exercise soreness at 4/10)  2. Pt will improve PROM of L shoulder to full symmetry (155 deg elevation, 60 deg extension and ER) in order to demonstrate successful CRISTOBAL and full available mobility in shoulder GOAL MET 3/3  LTG: (to be met in 10 treatments)  1. Pt will demonstrate improved L UE strength to 4/5 or greater in order to demonstrate improved stability/strength necessary for unrestricted ADLs/work activities GOAL PROGRESSING AND EXTENDED 3/3  2. Pt will improve AROM of L UE to full symmetry (155 deg elevation, 60 deg extension and ER, 75 deg IR, elbow flexion 120 deg) for full unrestricted reach in all planes GOAL PROGRESSING AND EXTENDED 3/3  3. Pt will demonstrate symmetrical Apley scratch test (cross body to posterior shoulder, ER to T2, IR to T9) for unrestricted dressing at [place] [address] 3/3  4. Pt will decrease SPADI to 10% impaired or less in order to demonstrate improved functional tolerances at OF with minimal restriction/dysfunction GOAL PROGRESSING AND EXTENDED 3/3  5. Pt will demonstrate independence with a long term HEP for continued progress/maintenance after completion of PT GOAL PROGRESSING AND EXTENDED 3/3       Pt. Education:  [x] Yes  [] No  [x] Reviewed Prior HEP/Ed  Method of Education: [x] Verbal  [x] Demo  [] Written  Comprehension of Education:   [x] Verbalizes understanding. [x] Demonstrates understanding. [] Needs review. [x] Demonstrates/verbalizes HEP/Ed previously given. Plan: [x] Continue per plan of care.    [] Other:         Treatment Charges: Mins Units   []  Modalities [x]  Ther Exercise 28 2   [x]  Manual Therapy 10 1   []  Ther Activities     []  Aquatics     []  Neuromuscular     [] Vasocompression     [] Gait Training     [] Dry needling        [] 1 or 2 muscles        [] 3 or more muscles     []  Other     Total Treatment time 38 3     Time In: 9:55am            Time Out: 10:33am    Electronically signed by:  Luma Barreto PT

## 2021-03-30 ENCOUNTER — HOSPITAL ENCOUNTER (OUTPATIENT)
Dept: PHYSICAL THERAPY | Age: 61
Setting detail: THERAPIES SERIES
Discharge: HOME | End: 2021-03-30
Payer: COMMERCIAL

## 2021-03-30 PROCEDURE — 97140 MANUAL THERAPY 1/> REGIONS: CPT

## 2021-03-30 PROCEDURE — 97110 THERAPEUTIC EXERCISES: CPT

## 2021-03-30 NOTE — FLOWSHEET NOTE
[facility]   [address]   Phone: [phone]   Fax: [phone]    Physical Therapy Daily Treatment Note      Date:  3/30/2021  Patient Name:  Larry Medina    :  1960  MRN: [id number]   Physician: Lucero Leon DO                        Insurance: Medical [place]. Visits BMN  Medical Diagnosis: M75.02 (ICD-10-CM) - Adhesive capsulitis of left shoulder      Rehab Codes: M75.02, R53.1, M25.561  Onset Date: 2020. CRISTOBAL 21                       Next 's appt: 21  Visit# / total visits:     Cancels/No Shows: 0/0    Subjective:    Pain:  [x] Yes  [] No Location:  L shoulder and upper back  Pain Rating: (0-10 scale) 1/10  Pain altered Tx:  [x] No  [] Yes  Action:  Comments: 3/30: Pt states that she's doing ok today but had a very rough day yesterday. 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 1/10 discomfort currently in L shoulder and upper back. SPADI score (3/3): 31% impaired    Objective:     All below objective measures last assessed 3/3/21      Range of Motion  Left Range of Motion  Right Strength  Left Strength  Right   Shoulder Flexion 150 /160 155 3+/5 4/5   Shoulder Abduction 150 / 160   155 3+/5 4/5   Shoulder Extension 60 60     Shoulder ER 55 / 60 60 3+/5 4/5   Shoulder IR 75 75 4/5 4+/5   Elbow Flexion WNL  4+/5 4+/5   Elbow Extension 0  3+/5 4/5   Pronation WNL      Supination WNL      Wrist Flexion WNL      Wrist Extension WNL       --- ---            Apley- Cross Body Lateral shoulder Posterior shoulder --- ---   Imagene  ER T1 T2 --- ---   Apley- Gross IR Buttock T9 --- ---     Sensation: WNL with no numbness or paresthesia in L UE    Modalities:   Precautions: None  Exercises:  Exercise Reps/ Time Weight/ Level Comments Performed=x Wand AAROM 20x ea  Flexion, ER, Abduction    Pulleys 20 reps   Flexion and abduction each    Standing wall slides 15 reps x 2  1 set flexion, 1 set scaption    Supine punches 15 reps x 3 3# Bilateral x   Supine shoulder flexion  15 reps x 2 1# 1# DB for passive overpressure at end range    Sidelying shoulder abduction 15 reps x 2  Weight of arm for passive overpressure at end range    Supine D2 PNF 10 reps x 2  Moderate manual resistance through range x   Alternating isometrics and rhythmic stabilizations to L shoulder in supine 5'  RS challenging ER/IR at 0 deg  AI in all planes at 90 deg flex    UBE 6' L2.5 3' fwds/3' bwds x   Sidelying ER 10 reps x 3 No weight today     Scapular push ups (standing in modified plank) 10 reps x 2  Hands elevated at high low mat (low thigh height)    Standing OH lift offs from wall 20 reps x 2 Orange L/R alternating each rep    Towel assisted IR stretch behind back 5'' hold x 15      Ball on wall 20 reps ea x 4 Volleyball Up/down, side/side, circles  2 sets flexion, 2 sets abduction x   Banded ER at 0 deg 10 reps x 2  Green tubing  x   Banded IR at 0 deg 15 reps x 2 Green tubing  x   Banded rows (low) 20 reps x 2 Green tubing     Banded shoulder flexion to 90 deg 10 reps x 2 Green tubing Bilateral    Banded shoulder extension 15 reps x 2 Green tubing Bilateral x   Banded horizontal abduction 10 reps x 2 Orange Bilateral in standing    Other: Manual therapy x 10'  -MFR to L infraspinatus, thoracic paraspinals and rhomboids  -Grade III L GHJ glides (inferior and posterior)  -PROM to L shoulder in all planes    Specific Instructions for next treatment: Progress active assistive ROM activities as tolerated followed by progressing RTC/scapular strengthening and stabilization as tolerated      Assessment: [x] Progressing toward goals:     [] No change.      [] Other:    [x] Patient would continue to benefit from skilled physical therapy services in order to: restore full functional use and strength of L UE to PLOF    3/30: Improved tolerances compared to previous visit, with only mild to moderate discomfort today primarily at L shoulder blade region. Was able to resume resistive activities without significant discomfort, just muscular fatigue and mild difficulty. Anticipate good prognosis to meet all goals in remaining 3 visits on POC. STG: (to be met in 8 treatments)  1. Pt will self report worst pain no greater than 2/10 in order to better tolerate ADLs with minimal dysfunction GOAL MET 3/3 (aside from brief period of post-exercise soreness at 4/10)  2. Pt will improve PROM of L shoulder to full symmetry (155 deg elevation, 60 deg extension and ER) in order to demonstrate successful CRISTOBAL and full available mobility in shoulder GOAL MET 3/3  LTG: (to be met in 10 treatments)  1. Pt will demonstrate improved L UE strength to 4/5 or greater in order to demonstrate improved stability/strength necessary for unrestricted ADLs/work activities GOAL PROGRESSING AND EXTENDED 3/3  2. Pt will improve AROM of L UE to full symmetry (155 deg elevation, 60 deg extension and ER, 75 deg IR, elbow flexion 120 deg) for full unrestricted reach in all planes GOAL PROGRESSING AND EXTENDED 3/3  3. Pt will demonstrate symmetrical Apley scratch test (cross body to posterior shoulder, ER to T2, IR to T9) for unrestricted dressing at [place] [address] 3/3  4. Pt will decrease SPADI to 10% impaired or less in order to demonstrate improved functional tolerances at PLOF with minimal restriction/dysfunction GOAL PROGRESSING AND EXTENDED 3/3  5. Pt will demonstrate independence with a long term HEP for continued progress/maintenance after completion of PT GOAL PROGRESSING AND EXTENDED 3/3       Pt. Education:  [x] Yes  [] No  [x] Reviewed Prior HEP/Ed  Method of Education: [x] Verbal  [x] Demo  [] Written  Comprehension of Education:   [x] Verbalizes understanding. [x] Demonstrates understanding.   [] Needs review. [x] Demonstrates/verbalizes HEP/Ed previously given. Plan: [x] Continue per plan of care.    [] Other:         Treatment Charges: Mins Units   []  Modalities     [x]  Ther Exercise 30 2   [x]  Manual Therapy 10 1   []  Ther Activities     []  Aquatics     []  Neuromuscular     [] Vasocompression     [] Gait Training     [] Dry needling        [] 1 or 2 muscles        [] 3 or more muscles     []  Other     Total Treatment time 40 3     Time In: 8:15am            Time Out: 8:55am    Electronically signed by:  Rubin Saravia PT

## 2021-04-01 ENCOUNTER — HOSPITAL ENCOUNTER (OUTPATIENT)
Dept: PHYSICAL THERAPY | Age: 61
Setting detail: THERAPIES SERIES
Discharge: HOME | End: 2021-04-01
Payer: COMMERCIAL

## 2021-04-01 PROCEDURE — 97110 THERAPEUTIC EXERCISES: CPT

## 2021-04-01 PROCEDURE — 97140 MANUAL THERAPY 1/> REGIONS: CPT

## 2021-04-01 NOTE — FLOWSHEET NOTE
[facility] Outpatient Physical Therapy   [address]   Phone: [phone]   Fax: [phone]    Physical Therapy Daily Treatment Note      Date:  2021  Patient Name:  Maninder Herrera    :  1960  MRN: [id number]   Physician: Ayden Mcdaniel DO                        Insurance: Medical [place]. Visits BMN  Medical Diagnosis: M75.02 (ICD-10-CM) - Adhesive capsulitis of left shoulder      Rehab Codes: M75.02, R53.1, M25.561  Onset Date: 2020. CRISTOBAL 21                       Next 's appt: 21  Visit# / total visits:     Cancels/No Shows: 0/0    Subjective:    Pain:  [x] Yes  [] No Location:  L shoulder and upper back  Pain Rating: (0-10 scale) 1/10  Pain altered Tx:  [x] No  [] Yes  Action:  Comments: : Pt states that she's doing well today and feeling much better than she did last week. 0.5/10 discomfort currently with no major complaints. SPADI score (3/3): 31% impaired    Objective:     All below objective measures last assessed 3/3/21      Range of Motion  Left Range of Motion  Right Strength  Left Strength  Right   Shoulder Flexion 150 /160 155 3+/5 4/5   Shoulder Abduction 150 / 160   155 3+/5 4/5   Shoulder Extension 60 60     Shoulder ER 55 / 60 60 3+/5 4/5   Shoulder IR 75 75 4/5 4+/5   Elbow Flexion WNL  4+/5 4+/5   Elbow Extension 0  3+/5 4/5   Pronation WNL      Supination WNL      Wrist Flexion WNL      Wrist Extension WNL       --- ---            Apley- Cross Body Lateral shoulder Posterior shoulder --- ---   Vearl Armando- Gross ER T1 T2 --- ---   Apley- Gross IR Buttock T9 --- ---     Sensation: WNL with no numbness or paresthesia in L UE    Modalities:   Precautions: None  Exercises:  Exercise Reps/ Time Weight/ Level Comments Performed=x   Wand AAROM 20x ea  Flexion, ER, Abduction    Pulleys 20 reps   Flexion and abduction each    Standing wall slides 15 reps x 2  1 set flexion, 1 set scaption    Supine punches 15 reps x 3 3# with emphasis on comfort at end range positions and RTC/scapular strengthening with overall good tolerance and goal to discharge from PT after completion of 2 remaining visits on POC. STG: (to be met in 8 treatments)  1. Pt will self report worst pain no greater than 2/10 in order to better tolerate ADLs with minimal dysfunction GOAL MET 3/3 (aside from brief period of post-exercise soreness at 4/10)  2. Pt will improve PROM of L shoulder to full symmetry (155 deg elevation, 60 deg extension and ER) in order to demonstrate successful CRISTOBAL and full available mobility in shoulder GOAL MET 3/3  LTG: (to be met in 10 treatments)  1. Pt will demonstrate improved L UE strength to 4/5 or greater in order to demonstrate improved stability/strength necessary for unrestricted ADLs/work activities GOAL PROGRESSING AND EXTENDED 3/3  2. Pt will improve AROM of L UE to full symmetry (155 deg elevation, 60 deg extension and ER, 75 deg IR, elbow flexion 120 deg) for full unrestricted reach in all planes GOAL PROGRESSING AND EXTENDED 3/3  3. Pt will demonstrate symmetrical Apley scratch test (cross body to posterior shoulder, ER to T2, IR to T9) for unrestricted dressing at [place] [address] 3/3  4. Pt will decrease SPADI to 10% impaired or less in order to demonstrate improved functional tolerances at PLOF with minimal restriction/dysfunction GOAL PROGRESSING AND EXTENDED 3/3  5. Pt will demonstrate independence with a long term HEP for continued progress/maintenance after completion of PT GOAL PROGRESSING AND EXTENDED 3/3       Pt. Education:  [x] Yes  [] No  [x] Reviewed Prior HEP/Ed  Method of Education: [x] Verbal  [x] Demo  [] Written  Comprehension of Education:   [x] Verbalizes understanding. [x] Demonstrates understanding. [] Needs review. [x] Demonstrates/verbalizes HEP/Ed previously given. Plan: [x] Continue per plan of care.    [] Other:         Treatment Charges: Mins Units   []  Modalities [x]  Ther Exercise 29 2   [x]  Manual Therapy 10 1   []  Ther Activities     []  Aquatics     []  Neuromuscular     [] Vasocompression     [] Gait Training     [] Dry needling        [] 1 or 2 muscles        [] 3 or more muscles     []  Other     Total Treatment time 39 3     Time In: 9:49am            Time Out: 10:28am    Electronically signed by:  Chloé Jeff, PT

## 2021-04-06 ENCOUNTER — HOSPITAL ENCOUNTER (OUTPATIENT)
Dept: PHYSICAL THERAPY | Age: 61
Setting detail: THERAPIES SERIES
Discharge: HOME | End: 2021-04-06
Payer: COMMERCIAL

## 2021-04-06 RX ORDER — CYCLOBENZAPRINE HCL 10 MG
TABLET ORAL
Qty: 30 TABLET | Refills: 1 | Status: SHIPPED | OUTPATIENT
Start: 2021-04-06 | End: 2021-06-16

## 2021-04-06 NOTE — TELEPHONE ENCOUNTER
Please address the medication refill and close the encounter. If I can be of assistance, please route to the applicable pool. Thank you. Last visit: 1/21/21  Last Med refill: 3-8-21  Does patient have enough medication for 72 hours: No:     Next Visit Date:  Future Appointments   Date Time Provider Nicola Zaidi   4/6/2021  8:15 AM Lorrin Rei, PT [facility] [place]   4/8/2021  8:15 AM Lorrin Rei, PT [facility] [place]   4/15/2021  9:45 AM Lorrin Rei, PT [facility] [place]   4/20/2021  9:00 AM Josse Quiroz MD [facility]   6/9/2021  8:30 AM DO Sola Mares [address] Maintenance   Topic Date Due    Shingles Vaccine (1 of 2) Never done    Diabetic retinal exam  01/12/2017    Diabetic microalbuminuria test  12/13/2020    DTaP/Tdap/Td vaccine (2 - Tdap) 12/15/2020    Diabetic foot exam  11/12/2021 (Originally 12/1/2017)    Lipid screen  08/04/2021    A1C test (Diabetic or Prediabetic)  12/08/2021    Potassium monitoring  02/04/2022    Creatinine monitoring  02/04/2022    Colon cancer screen colonoscopy  09/20/2022    Breast cancer screen  11/14/2022    Flu vaccine  Completed    Pneumococcal 0-64 years Vaccine  Completed    COVID-19 Vaccine  Completed    Hepatitis C screen  Completed    HIV screen  Completed    Hepatitis A vaccine  Aged Out    Hib vaccine  Aged Out    Meningococcal (ACWY) vaccine  Aged Out       Hemoglobin A1C (%)   Date Value   12/08/2020 7.9   08/04/2020 7.5   09/26/2019 7.4             ( goal A1C is < 7)   Microalb/Crt.  Ratio (mcg/mg creat)   Date Value   12/13/2019 CANNOT BE CALCULATED     LDL Cholesterol (mg/dL)   Date Value   08/04/2020 114   06/24/2019 42       (goal LDL is <100)   AST (U/L)   Date Value   02/04/2021 18     ALT (U/L)   Date Value   02/04/2021 20     BUN (mg/dL)   Date Value   02/04/2021 25 (H)     BP Readings from Last 3 Encounters:   02/17/21 108/60   02/17/21 101/64   02/04/21 116/71 (goal 120/80)    All Future Testing planned in [facility]  Lab Frequency Next Occurrence   Microalbumin, Ur Once 12/08/2021   EKG 12 Lead Once 02/09/2021   COVID-19 Once 02/12/2021               Patient Active Problem List:     Cervical spondylosis     Type II or unspecified type diabetes mellitus without mention of complication, not stated as uncontrolled     Hypertension     Abnormal auditory perception     Eustachian tube dysfunction     Chronic serous otitis media     Nasal polyps     Nasal congestion     Osteoarthritis of left knee     Osteoarthritis of right knee     DIEGO (nonalcoholic steatohepatitis)     Vitamin D deficiency     Iron deficiency anemia     Dyslipidemia     Diabetes mellitus type 2, uncontrolled (HCC)     Left hip pain     Trochanteric bursitis     Fatigue     Daytime somnolence     Snoring     Chronic left shoulder pain     Restless legs syndrome     Generalized anxiety disorder     Primary osteoarthritis, left shoulder     Tendinopathy of left shoulder     Adhesive capsulitis of left shoulder

## 2021-04-06 NOTE — FLOWSHEET NOTE
[x] [facility]) [facility] & Therapy  [address]  [place]: [phone]   F: [phone]     Physical Therapy Cancel/No Show note    Date: 2021  Patient: Rebecca Buckner  : 1960  MRN: [id number]    Visit Count:   Cancels/No Shows to date:     For today's appointment patient:    [x]  Cancelled    [] Rescheduled appointment    [] No-show     Reason given by patient:    [x]  Patient ill    []  Conflicting appointment    [] No transportation      [] Conflict with work    [] No reason given    [] Weather related    [] COVID-19    [] Other:      Comments:        [x] Next appointment was confirmed    Electronically signed by: Bria Lane, PT

## 2021-04-08 ENCOUNTER — HOSPITAL ENCOUNTER (OUTPATIENT)
Dept: PHYSICAL THERAPY | Age: 61
Setting detail: THERAPIES SERIES
Discharge: HOME | End: 2021-04-08
Payer: COMMERCIAL

## 2021-04-08 PROCEDURE — 97110 THERAPEUTIC EXERCISES: CPT

## 2021-04-08 NOTE — FLOWSHEET NOTE
[address] [facility]   [address]   Phone: [phone]   Fax: [phone]    Physical Therapy Daily Treatment Note      Date:  2021  Patient Name:  Anais Van    :  1960  MRN: [id number]   Physician: Benigno Zuñiga DO                        Insurance: Medical [place]. Visits BMN  Medical Diagnosis: M75.02 (ICD-10-CM) - Adhesive capsulitis of left shoulder      Rehab Codes: M75.02, R53.1, M25.561  Onset Date: 2020. CRISTOBAL 21                       Next 's appt: 21  Visit# / total visits:     Cancels/No Shows: 10    Subjective:    Pain:  [x] Yes  [] No Location:  L shoulder and upper back  Pain Rating: (0-10 scale) 1/10  Pain altered Tx:  [x] No  [] Yes  Action:  Comments: : Pt states that she's feeling much better than she was on Tuesday. States that she was dealing with severe dizziness/vertigo all day Tuesday. Feels like it's mostly resolved now with no complaints currently. No pain in L shoulder currently, just mild soreness/stiffness. SPADI score (3/3): 31% impaired    Objective:     All below objective measures last assessed 3/3/21      Range of Motion  Left Range of Motion  Right Strength  Left Strength  Right   Shoulder Flexion 150 /160 155 3+/5 4/5   Shoulder Abduction 150 / 160   155 3+/5 4/5   Shoulder Extension 60 60     Shoulder ER 55 / 60 60 3+/5 4/5   Shoulder IR 75 75 4/5 4+/5   Elbow Flexion WNL  4+/5 4+/5   Elbow Extension 0  3+/5 4/5   Pronation WNL      Supination WNL      Wrist Flexion WNL      Wrist Extension WNL       --- ---            Apley- Cross Body Lateral shoulder Posterior shoulder --- ---   [place] Marie- Gross ER T1 T2 --- ---   Apley- Gross IR Buttock T9 --- ---     Sensation: WNL with no numbness or paresthesia in L UE    Modalities:   Precautions: None  Exercises:  Exercise Reps/ Time Weight/ Level Comments Performed=x   Wand AAROM 20x ea  Flexion, ER, Abduction    Pulleys 20 reps Flexion and abduction each    Standing wall slides 15 reps x 2  1 set flexion, 1 set scaption    Supine punches 15 reps x 3 3# Bilateral    Supine shoulder flexion  15 reps x 2 1# 1# DB for passive overpressure at end range    Sidelying shoulder abduction 15 reps x 2 2# 2# DB for passive overpressure at end range    Supine D2 PNF 10 reps x 2  Moderate manual resistance through range    Alternating isometrics and rhythmic stabilizations to L shoulder in supine 5'  RS challenging ER/IR at 0 deg  AI in all planes at 90 deg flex    UBE 6' L2 3' fwds/3' bwds x   Sidelying ER 10 reps x 3 No weight today     Behind the back IR lift offs 10 reps x 2   x   Scapular push ups (standing in modified plank) 10 reps x 2  Hands elevated at high low mat (low thigh height) x   Standing OH lift offs from wall 20 reps x 2 Orange L/R alternating each rep x   Banded wall clocks 20 reps x 3 Orange Reaching to 10:00 and 2:00 only  Maintain ER tension on band  L/R alt each rep x   Towel assisted IR stretch behind back 5'' hold x 15      Ball on wall 20 reps ea x 2 2# Up/down, side/side, circles  1 set flexion, 1 set abduction x   Banded ER at 0 deg 10 reps x 2  Green tubing     Banded IR at 0 deg 15 reps x 2 Green tubing     Banded rows (low) 20 reps x 2 Green tubing     Banded shoulder flexion to 90 deg 10 reps x 2 Green tubing Bilateral    Banded shoulder extension 15 reps x 2 Green tubing Bilateral    Banded horizontal abduction 15 reps x 3 Orange Bilateral in standing x   Other: Manual therapy x 10' - withheld today  -MFR to L infraspinatus, thoracic paraspinals and rhomboids  -Grade III L GHJ glides (inferior and posterior)  -PROM to L shoulder in all planes    Specific Instructions for next treatment: Progress active assistive ROM activities as tolerated followed by progressing RTC/scapular strengthening and stabilization as tolerated      Assessment: [x] Progressing toward goals:     [] No change.      [] Other:    [x] Patient would continue to benefit from skilled physical therapy services in order to: restore full functional use and strength of L UE to PLOF    4/8: Pt's L shoulder was moving well today with no significant limitations or discomfort with overhead range, with no need for manual intervention today. Continued to emphasize high level strength and stability in L shoulder with good tolerance through all resistive activities. Progress note due at next visit and anticipate good prognosis to safely transition to an independent HEP at that time. STG: (to be met in 8 treatments)  1. Pt will self report worst pain no greater than 2/10 in order to better tolerate ADLs with minimal dysfunction GOAL MET 3/3 (aside from brief period of post-exercise soreness at 4/10)  2. Pt will improve PROM of L shoulder to full symmetry (155 deg elevation, 60 deg extension and ER) in order to demonstrate successful CRISTOBAL and full available mobility in shoulder GOAL MET 3/3  LTG: (to be met in 10 treatments)  1. Pt will demonstrate improved L UE strength to 4/5 or greater in order to demonstrate improved stability/strength necessary for unrestricted ADLs/work activities GOAL PROGRESSING AND EXTENDED 3/3  2. Pt will improve AROM of L UE to full symmetry (155 deg elevation, 60 deg extension and ER, 75 deg IR, elbow flexion 120 deg) for full unrestricted reach in all planes GOAL PROGRESSING AND EXTENDED 3/3  3. Pt will demonstrate symmetrical Apley scratch test (cross body to posterior shoulder, ER to T2, IR to T9) for unrestricted dressing at [place] [address] 3/3  4. Pt will decrease SPADI to 10% impaired or less in order to demonstrate improved functional tolerances at PLOF with minimal restriction/dysfunction GOAL PROGRESSING AND EXTENDED 3/3  5. Pt will demonstrate independence with a long term HEP for continued progress/maintenance after completion of PT GOAL PROGRESSING AND EXTENDED 3/3       Pt.  Education:  [x] Yes  [] No  [x] Reviewed Prior HEP/Ed  Method of Education: [x] Verbal  [x] Demo  [] Written  Comprehension of Education:   [x] Verbalizes understanding. [x] Demonstrates understanding. [] Needs review. [x] Demonstrates/verbalizes HEP/Ed previously given. Plan: [x] Continue per plan of care.    [x] Other: Progress note due next visit to determine additional skilled PT needs        Treatment Charges: Mins Units   []  Modalities     [x]  Ther Exercise 39 3   [x]  Manual Therapy     []  Ther Activities     []  Aquatics     []  Neuromuscular     [] Vasocompression     [] Gait Training     [] Dry needling        [] 1 or 2 muscles        [] 3 or more muscles     []  Other     Total Treatment time 39 3     Time In: 8:15am            Time Out: 8:54am    Electronically signed by:  Tobin Navarro PT

## 2021-04-15 ENCOUNTER — HOSPITAL ENCOUNTER (OUTPATIENT)
Dept: PHYSICAL THERAPY | Age: 61
Setting detail: THERAPIES SERIES
Discharge: HOME | End: 2021-04-15
Payer: COMMERCIAL

## 2021-04-15 PROCEDURE — 97110 THERAPEUTIC EXERCISES: CPT

## 2021-04-15 NOTE — DISCHARGE SUMMARY
[facility] Outpatient Physical Therapy   [address]   Phone: [phone]   Fax: [phone]    Physical Therapy Progress Note and Discharge Summary      Date:  4/15/2021  Patient Name:  Alex Powell    :  1960  MRN: [id number]   Physician: Kadyen Hampton DO                        Insurance: Medical [place]. Visits BMN  Medical Diagnosis: M75.02 (ICD-10-CM) - Adhesive capsulitis of left shoulder      Rehab Codes: M75.02, R53.1, M25.561  Onset Date: 2020. CRISTOBAL 21                       Next 's appt: 21  Visit# / total visits:     Cancels/No Shows: 10    Subjective:    Pain:  [x] Yes  [] No Location:  L shoulder and upper back  Pain Rating: (0-10 scale) 0/10  Pain altered Tx:  [x] No  [] Yes  Action:  Comments: 4/15: Pt states that overall she's doing very well in regard to her L shoulder with no major issues, just having a slightly more off day today with feeling a little stiffer than usual. Otherwise, states that she feels like she's made very good progress since starting PT and denies any major reaching limitations. Formal progress note performed today for reporting period 3/3/21-4/15/21    SPADI score (4/15): 10% impaired    Objective:     All below objective measures last assessed 4/15/21      Range of Motion  Left Range of Motion  Right Strength  Left Strength  Right   Shoulder Flexion 155 155 4/5 4/5   Shoulder Abduction 160   160 3+/5 4/5   Shoulder Extension 60 60     Shoulder ER 60 60 4/5 4/5   Shoulder IR 75 75 4/5 4+/5   Elbow Flexion WNL  4+/5 4+/5   Elbow Extension 0  4/5 4/5   Pronation WNL      Supination WNL      Wrist Flexion WNL      Wrist Extension WNL       --- ---            Apley- Cross Body Lateral shoulder Posterior shoulder --- ---   Jamse Dome- Gross ER T2 T2 --- ---   Apley- Gross IR T11 T9 --- ---     Sensation: WNL with no numbness or paresthesia in L UE    Modalities:   Precautions: None  Exercises:  Exercise Reps/ Time Weight/ Level Comments Performed=x   Wand AAROM 20x ea  Flexion, ER, Abduction    Pulleys 20 reps   Flexion and abduction each    Standing wall slides 15 reps x 2  1 set flexion, 1 set scaption    Supine punches 15 reps x 3 3# Bilateral    Supine shoulder flexion  15 reps x 2 1# 1# DB for passive overpressure at end range    Sidelying shoulder abduction 15 reps x 2 2# 2# DB for passive overpressure at end range    Inferior capsule stretch (behind head) 30'' x 3   x   Posterior capsule stretch (cross body) 30'' x 3   x   Dowel AAROM- extension and IR behind the back 10 reps ea   x   Supine D2 PNF 10 reps x 2  Moderate manual resistance through range    Alternating isometrics and rhythmic stabilizations to L shoulder in supine 5'  RS challenging ER/IR at 0 deg  AI in all planes at 90 deg flex    UBE 6' L2 3' fwds/3' bwds x   Sidelying ER 10 reps x 3 No weight today     Behind the back IR lift offs 10 reps x 2      Scapular push ups (standing in modified plank) 10 reps x 2  Hands elevated at high low mat (low thigh height)    Standing OH lift offs from wall 20 reps x 2 Orange L/R alternating each rep    Banded wall clocks 20 reps x 3 Orange Reaching to 10:00 and 2:00 only  Maintain ER tension on band  L/R alt each rep    Towel assisted IR stretch behind back 5'' hold x 15      Ball on wall 20 reps ea x 2 2# Up/down, side/side, circles  1 set flexion, 1 set abduction    Banded ER at 0 deg 10 reps x 2  Green tubing     Banded IR at 0 deg 15 reps x 2 Green tubing     Banded rows (low) 20 reps x 2 Green tubing     Banded shoulder flexion to 90 deg 10 reps x 2 Green tubing Bilateral    Banded shoulder extension 15 reps x 2 Green tubing Bilateral    Banded horizontal abduction 15 reps x 3 Orange Bilateral in standing    Other:     Re-evaluation of all subjective and objective measures + final HEP review- billed as therex    Specific Instructions for next treatment: Progress active assistive ROM activities as tolerated followed by progressing RTC/scapular strengthening and stabilization as tolerated      Assessment: [x] Progressing toward goals:     [] No change. [x] Other: Appropriate to transition to an independent HEP    [] Patient would continue to benefit from skilled physical therapy services in order to: restore full functional use and strength of L UE to PLOF    Pt has been seen for 20 PT visits to date, now 2 months s/p L shoulder CRISTOBAL. Overall, demonstrates excellent progress to date with regard to pain levels, AROM and functional reaching tolerances in all planes with no reported limitations with any ADLs or work tolerances. Upon re-evaluation, pt demonstrates full symmetry with all goniometry today and only mild residual limitations with cross body and IR reach behind back. Good improvement in UE strength with all resistive testing today and pt is appropriate at this time to transition to an independent HEP for continued progress/maintenance. Final written HEP issued today with good understanding of all instructions and pt demonstrating good motivation to continue compliance. Pt will be discharged from PT at this time and anticipate excellent long term prognosis. STG: (to be met in 8 treatments)  1. Pt will self report worst pain no greater than 2/10 in order to better tolerate ADLs with minimal dysfunction GOAL MET 3/3 (aside from brief period of post-exercise soreness at 4/10)  2. Pt will improve PROM of L shoulder to full symmetry (155 deg elevation, 60 deg extension and ER) in order to demonstrate successful CRISTOBAL and full available mobility in shoulder GOAL MET 3/3  LTG: (to be met in 10 treatments)  1. Pt will demonstrate improved L UE strength to 4/5 or greater in order to demonstrate improved stability/strength necessary for unrestricted ADLs/work activities GOAL 90% MET; PROGRESSING W/HEP 4/15  2.  Pt will improve AROM of L UE to full symmetry (155 deg elevation, 60 deg extension and ER, 75 deg IR, elbow flexion 120 deg) for full unrestricted reach in all planes GOAL MET 4/15  3. Pt will demonstrate symmetrical Apley scratch test (cross body to posterior shoulder, ER to T2, IR to T9) for unrestricted dressing at PLOF GOAL 90% MET; PROGRESSING W/HEP  4. Pt will decrease SPADI to 10% impaired or less in order to demonstrate improved functional tolerances at PLOF with minimal restriction/dysfunction GOAL MET 4/15  5. Pt will demonstrate independence with a long term HEP for continued progress/maintenance after completion of PT GOAL MET 4/15       Pt. Education:  [x] Yes  [] No  [x] Reviewed Prior HEP/Ed  Method of Education: [x] Verbal  [x] Demo  [x] Written  Comprehension of Education:   [x] Verbalizes understanding. [x] Demonstrates understanding. [] Needs review. [x] Demonstrates/verbalizes HEP/Ed previously given. Plan: [] Continue per plan of care.    [x] Other: Discharge to independent HEP        Treatment Charges: Mins Units   []  Modalities     [x]  Ther Exercise 39 3   []  Manual Therapy     []  Ther Activities     []  Aquatics     []  Neuromuscular     [] Vasocompression     [] Gait Training     [] Dry needling        [] 1 or 2 muscles        [] 3 or more muscles     []  Other     Total Treatment time 39 3     Time In: 9:46am            Time Out: 10:25am    Electronically signed by:  Davy Cueto PT

## 2021-04-19 ENCOUNTER — TELEPHONE (OUTPATIENT)
Dept: FAMILY MEDICINE CLINIC | Age: 61
End: 2021-04-19

## 2021-04-19 RX ORDER — BUSPIRONE HYDROCHLORIDE 10 MG/1
TABLET ORAL
Qty: 180 TABLET | Refills: 0 | Status: SHIPPED | OUTPATIENT
Start: 2021-04-19 | End: 2021-06-09

## 2021-04-19 NOTE — TELEPHONE ENCOUNTER
[facility] pending for refill     Health Maintenance   Topic Date Due    Shingles Vaccine (1 of 2) Never done    Diabetic retinal exam  01/12/2017    Diabetic microalbuminuria test  12/13/2020    DTaP/Tdap/Td vaccine (2 - Tdap) 12/15/2020    Diabetic foot exam  11/12/2021 (Originally 12/1/2017)    Lipid screen  08/04/2021    A1C test (Diabetic or Prediabetic)  12/08/2021    Potassium monitoring  02/04/2022    Creatinine monitoring  02/04/2022    Colon cancer screen colonoscopy  09/20/2022    Breast cancer screen  11/14/2022    Flu vaccine  Completed    Pneumococcal 0-64 years Vaccine  Completed    COVID-19 Vaccine  Completed    Hepatitis C screen  Completed    HIV screen  Completed    Hepatitis A vaccine  Aged Out    Hib vaccine  Aged Out    Meningococcal (ACWY) vaccine  Aged Out             (applicable per patient's age: Cancer Screenings, Depression Screening, Fall Risk Screening, Immunizations)    Hemoglobin A1C (%)   Date Value   12/08/2020 7.9   08/04/2020 7.5   09/26/2019 7.4     Microalb/Crt.  Ratio (mcg/mg creat)   Date Value   12/13/2019 CANNOT BE CALCULATED     LDL Cholesterol (mg/dL)   Date Value   08/04/2020 114     AST (U/L)   Date Value   02/04/2021 18     ALT (U/L)   Date Value   02/04/2021 20     BUN (mg/dL)   Date Value   02/04/2021 25 (H)      (goal A1C is < 7)   (goal LDL is <100) need 30-50% reduction from baseline     BP Readings from Last 3 Encounters:   02/17/21 108/60   02/17/21 101/64   02/04/21 116/71    (goal /80)      All Future Testing planned in [facility]:  Lab Frequency Next Occurrence   Microalbumin, Ur Once 12/08/2021   EKG 12 Lead Once 02/09/2021   COVID-19 Once 02/12/2021       Next Visit Date:  Future Appointments   Date Time Provider Nicola Zaidi   4/20/2021  9:00 AM Jackie Hernadez MD [id number] [facility]   6/9/2021  8:30 AM Ahsan Hernandez DO [facility] [address] [facility]            Patient Active Problem List:     Cervical spondylosis     Type II or unspecified type diabetes mellitus without mention of complication, not stated as uncontrolled     Hypertension     Abnormal auditory perception     Eustachian tube dysfunction     Chronic serous otitis media     Nasal polyps     Nasal congestion     Osteoarthritis of left knee     Osteoarthritis of right knee     DIEGO (nonalcoholic steatohepatitis)     Vitamin D deficiency     Iron deficiency anemia     Dyslipidemia     Diabetes mellitus type 2, uncontrolled (HCC)     Left hip pain     Trochanteric bursitis     Fatigue     Daytime somnolence     Snoring     Chronic left shoulder pain     Restless legs syndrome     Generalized anxiety disorder     Primary osteoarthritis, left shoulder     Tendinopathy of left shoulder     Adhesive capsulitis of left shoulder

## 2021-04-20 ENCOUNTER — OFFICE VISIT (OUTPATIENT)
Dept: FAMILY MEDICINE CLINIC | Age: 61
End: 2021-04-20
Payer: COMMERCIAL

## 2021-04-20 ENCOUNTER — HOSPITAL ENCOUNTER (OUTPATIENT)
Age: 61
Setting detail: SPECIMEN
Discharge: HOME | End: 2021-04-20
Payer: COMMERCIAL

## 2021-04-20 ENCOUNTER — HOSPITAL ENCOUNTER (OUTPATIENT)
Dept: GENERAL RADIOLOGY | Facility: CLINIC | Age: 61
Discharge: HOME | End: 2021-04-22
Payer: COMMERCIAL

## 2021-04-20 ENCOUNTER — HOSPITAL ENCOUNTER (OUTPATIENT)
Facility: CLINIC | Age: 61
Discharge: HOME | End: 2021-04-22
Payer: COMMERCIAL

## 2021-04-20 VITALS
WEIGHT: 193.4 LBS | DIASTOLIC BLOOD PRESSURE: 71 MMHG | BODY MASS INDEX: 34.27 KG/M2 | HEIGHT: 63 IN | SYSTOLIC BLOOD PRESSURE: 105 MMHG | TEMPERATURE: 97.1 F | HEART RATE: 99 BPM

## 2021-04-20 DIAGNOSIS — E11.9 TYPE 2 DIABETES MELLITUS WITHOUT COMPLICATION, WITHOUT LONG-TERM CURRENT USE OF INSULIN (HCC): ICD-10-CM

## 2021-04-20 DIAGNOSIS — E78.5 DYSLIPIDEMIA: ICD-10-CM

## 2021-04-20 DIAGNOSIS — M16.11 OSTEOARTHRITIS OF RIGHT HIP, UNSPECIFIED OSTEOARTHRITIS TYPE: ICD-10-CM

## 2021-04-20 DIAGNOSIS — E11.9 TYPE 2 DIABETES MELLITUS WITHOUT COMPLICATION, WITHOUT LONG-TERM CURRENT USE OF INSULIN (HCC): Primary | ICD-10-CM

## 2021-04-20 LAB
BUN BLDV-MCNC: 28 MG/DL (ref 8–23)
CHOLESTEROL/HDL RATIO: 2.5
CHOLESTEROL: 154 MG/DL
CREAT SERPL-MCNC: 1.16 MG/DL (ref 0.5–0.9)
CREATININE URINE: 65.4 MG/DL (ref 28–217)
GFR AFRICAN AMERICAN: 58 ML/MIN
GFR NON-AFRICAN AMERICAN: 48 ML/MIN
GFR SERPL CREATININE-BSD FRML MDRD: ABNORMAL ML/MIN/{1.73_M2}
GFR SERPL CREATININE-BSD FRML MDRD: ABNORMAL ML/MIN/{1.73_M2}
HBA1C MFR BLD: 7.3 %
HDLC SERPL-MCNC: 62 MG/DL
LDL CHOLESTEROL: 48 MG/DL (ref 0–130)
MICROALBUMIN/CREAT 24H UR: <12 MG/L
MICROALBUMIN/CREAT UR-RTO: NORMAL MCG/MG CREAT
TRIGL SERPL-MCNC: 219 MG/DL
VLDLC SERPL CALC-MCNC: ABNORMAL MG/DL (ref 1–30)

## 2021-04-20 PROCEDURE — 73502 X-RAY EXAM HIP UNI 2-3 VIEWS: CPT

## 2021-04-20 PROCEDURE — 99214 OFFICE O/P EST MOD 30 MIN: CPT | Performed by: FAMILY MEDICINE

## 2021-04-20 PROCEDURE — 3051F HG A1C>EQUAL 7.0%<8.0%: CPT | Performed by: FAMILY MEDICINE

## 2021-04-20 PROCEDURE — 99211 OFF/OP EST MAY X REQ PHY/QHP: CPT | Performed by: FAMILY MEDICINE

## 2021-04-20 PROCEDURE — 83036 HEMOGLOBIN GLYCOSYLATED A1C: CPT | Performed by: FAMILY MEDICINE

## 2021-04-20 RX ORDER — ALBUTEROL SULFATE 90 UG/1
2 AEROSOL, METERED RESPIRATORY (INHALATION) EVERY 4 HOURS PRN
Qty: 2 INHALER | Refills: 5 | Status: SHIPPED | OUTPATIENT
Start: 2021-04-20 | End: 2022-04-20

## 2021-04-20 RX ORDER — SUCRALFATE 1 G/1
1 TABLET ORAL 4 TIMES DAILY
Qty: 120 TABLET | Refills: 3 | Status: SHIPPED | OUTPATIENT
Start: 2021-04-20 | End: 2021-06-16

## 2021-04-20 NOTE — PATIENT INSTRUCTIONS
Thank you for letting us take care of you today. We hope all your questions were addressed. If a question was overlooked or something else comes to mind after you return home, please contact a member of your Care Team listed below. Your Care Team at [place] [address] is Team #1  Jose Mcgee MD (Faculty)  Flo Jaramillo (Resident)  Gloria Miner MD (Resident)  Mila Crane., SHERRY Cotter, LPN  [facility] office)  Stormy Rome, [address] (Clinical Practice Manager)  Norma Santacruz [facility] (Clinical Pharmacist)     Office phone number: [phone]    If you need to get in right away due to illness, please be advised we have \"Same Day\" appointments available Monday-Friday. Please call us at [phone] option #3 to schedule your \"Same Day\" appointment. Patient Education        Learning About a Hip Bursa Injection  What is a hip bursa injection? A bursa is a small sac of fluid that cushions an area between tendons and bones. The bursa on the outer side of the hip bone is called the trochanteric (say \"VJIW-igc-UEDW-ik\") bursa. Sometimes it can become swollen and painful. This condition is called bursitis. An injection into the bursa is a shot of medicine to reduce pain and swelling. The medicines may include pain relievers and steroid medicines. A steroid shot can sometimes help with short-term pain relief when other treatments haven't worked. How is a hip bursa injection done? First the area over the bursa will be cleaned. Your doctor may use a tiny needle to numb the skin over the area where you will get the injection. If a tiny needle is used to numb the area, your doctor will use another needle to inject the medicine. Your doctor may use a pain reliever, a steroid, or both. You may feel some pressure or discomfort. Your doctor may put ice on the area before you go home. What can you expect after the injection? You will probably go home soon after your shot.  You may have numbness over your hip for a few hours. If your shot included both a pain reliever and a steroid, the pain will probably go away right away. But it might come back after a few hours. This might happen if the pain reliever wears off and the steroid has not started to work yet. The steroid medicine generally takes a few days to work. If the pain comes back, you can put ice or a cold pack on your hip for 10 to 20 minutes at a time. Put a thin cloth between the ice and your skin. Follow your doctor's instructions carefully. Avoid strenuous activities on the day you get the shot, especially those that put stress on your hip. Steroids don't always work. But when they do, the pain relief can last for several days to a few months or longer. Follow-up care is a key part of your treatment and safety. Be sure to make and go to all appointments, and call your doctor if you are having problems. It's also a good idea to know your test results and keep a list of the medicines you take. Where can you learn more? Go to https://InVisage Technologies.Kashless. org and sign in to your [employer] account. Enter L104 in the OneBuckResume box to learn more about \"Learning About a Hip Bursa Injection. \"     If you do not have an account, please click on the \"Sign Up Now\" link. Current as of: November 16, 2020               Content Version: 12.8  © 2006-2021 TimeBridge. Care instructions adapted under license by [facility] ([facility]). If you have questions about a medical condition or this instruction, always ask your healthcare professional. [place] [address] any warranty or liability for your use of this information. Patient Education        Hip Bursitis: Care Instructions  Your Care Instructions     Bursitis is inflammation of the bursa. A bursa is a small sac of fluid that cushions a joint and helps it move easily.  A bursa sits between a bone in the hip and the muscles and tendons in the thigh and buttock. Injury or overuse of the hip can cause bursitis. Activities that can lead to bursitis include twisting and rapid joint movement. Bursitis can cause hip pain. Bursitis usually gets better if you avoid the activity that caused it. If pain lasts or gets worse despite home treatment, your doctor may draw fluid from the bursa through a needle. This may relieve your pain and help your doctor know if you have an infection. If so, your doctor will prescribe antibiotics. If you have inflammation only, you may get a corticosteroid shot to reduce swelling and pain. Sometimes surgery is needed to drain or remove the bursa. Follow-up care is a key part of your treatment and safety. Be sure to make and go to all appointments, and call your doctor if you are having problems. It's also a good idea to know your test results and keep a list of the medicines you take. How can you care for yourself at home? · Put ice or a cold pack on your hip for 10 to 20 minutes at a time. Put a thin cloth between the ice and your skin. · After 3 days of using ice, you may use heat on your hip. You can use a hot water bottle, a heating pad set on low, or a warm, moist towel. · Rest your hip. Stop any activities that cause pain. Switch to activities that do not stress your hip. · Take your medicines exactly as prescribed. Call your doctor if you think you are having a problem with your medicine. · Ask your doctor if you can take an over-the-counter pain medicine, such as acetaminophen (Tylenol), ibuprofen (Advil, Motrin), or naproxen (Aleve). Be safe with medicines. Read and follow all instructions on the label. · To prevent stiffness, gently move the hip joint as much as you can without pain every day. As the pain gets better, keep doing range-of-motion exercises. Ask your doctor for exercises that will make the muscles around the hip joint stronger. Do these as directed.   · You can slowly return to the activity that caused the pain, but do it with less effort until you can do it without pain or swelling. Be sure to warm up before and stretch after you do the activity. When should you call for help? Call your doctor now or seek immediate medical care if:    · You have a fever.     · You have increased swelling or redness in your hip.     · You cannot use your hip, or the pain in your hip gets worse. Watch closely for changes in your health, and be sure to contact your doctor if:    · You have pain for 2 weeks or longer despite home treatment. Where can you learn more? Go to https://Meet.compePushkart.Pandora Media. org and sign in to your [employer] account. Enter O282 in the Tails.com box to learn more about \"Hip Bursitis: Care Instructions. \"     If you do not have an account, please click on the \"Sign Up Now\" link. Current as of: November 16, 2020               Content Version: 12.8  © 2006-2021 Lithium Technologies. Care instructions adapted under license by [facility] ([facility]). If you have questions about a medical condition or this instruction, always ask your healthcare professional. [place] [address] any warranty or liability for your use of this information. Patient Education        Hip Bursitis: Exercises  Introduction  Here are some examples of exercises for you to try. The exercises may be suggested for a condition or for rehabilitation. Start each exercise slowly. Ease off the exercises if you start to have pain. You will be told when to start these exercises and which ones will work best for you. How to do the exercises  Hip rotator stretch   1. Lie on your back with both knees bent and your feet flat on the floor. 2. Put the ankle of your affected leg on your opposite thigh near your knee. 3. Use your hand to gently push your knee away from your body until you feel a gentle stretch around your hip. 4. Hold the stretch for 15 to 30 seconds. 5. Repeat 2 to 4 times.   6. Repeat steps link.  Current as of: November 16, 2020               Content Version: 12.8  © 2006-2021 [employer]ikes [employer]. Care instructions adapted under license by [facility] ([facility]). If you have questions about a medical condition or this instruction, always ask your healthcare professional. [place] [address] any warranty or liability for your use of this information. Patient Education        The Hip Joint: Anatomy Sketch    Current as of: November 16, 2020               Content Version: 12.8  © 2006-2021 [employer]. Care instructions adapted under license by [facility] ([facility]). If you have questions about a medical condition or this instruction, always ask your healthcare professional. [facility] [address] any warranty or liability for your use of this information. Patient Education        Hip Pain: Care Instructions  Your Care Instructions     Hip pain may be caused by many things, including overuse, a fall, or a twisting movement. Another cause of hip pain is arthritis. Your pain may increase when you stand up, walk, or squat. The pain may come and go or may be constant. Home treatment can help relieve hip pain, swelling, and stiffness. If your pain is ongoing, you may need more tests and treatment. Follow-up care is a key part of your treatment and safety. Be sure to make and go to all appointments, and call your doctor if you are having problems. It's also a good idea to know your test results and keep a list of the medicines you take. How can you care for yourself at home? · Take pain medicines exactly as directed. ? If the doctor gave you a prescription medicine for pain, take it as prescribed. ? If you are not taking a prescription pain medicine, ask your doctor if you can take an over-the-counter medicine. · Rest and protect your hip. Take a break from any activity, including standing or walking, that may cause pain.   · Put ice or a cold pack against your hip for 10 to 20 minutes at a time. Try to do this every 1 to 2 hours for the next 3 days (when you are awake) or until the swelling goes down. Put a thin cloth between the ice and your skin. · Sleep on your healthy side with a pillow between your knees, or sleep on your back with pillows under your knees. · If there is no swelling, you can put moist heat, a heating pad, or a warm cloth on your hip. Do gentle stretching exercises to help keep your hip flexible. · Learn how to prevent falls. Have your vision and hearing checked regularly. Wear slippers or shoes with a nonskid sole. · Stay at a healthy weight. · Wear comfortable shoes. When should you call for help? Call 911 anytime you think you may need emergency care. For example, call if:    · You have sudden chest pain and shortness of breath, or you cough up blood.     · You are not able to stand or walk or bear weight.     · Your buttocks, legs, or feet feel numb or tingly.     · Your leg or foot is cool or pale or changes color.     · You have severe pain. Call your doctor now or seek immediate medical care if:    · You have signs of infection, such as:  ? Increased pain, swelling, warmth, or redness in the hip area. ? Red streaks leading from the hip area. ? Pus draining from the hip area. ? A fever.     · You have signs of a blood clot, such as:  ? Pain in your calf, back of the knee, thigh, or groin. ? Redness and swelling in your leg or groin.     · You are not able to bend, straighten, or move your leg normally.     · You have trouble urinating or having bowel movements. Watch closely for changes in your health, and be sure to contact your doctor if:    · You do not get better as expected. Where can you learn more? Go to https://TutorpeApplauzeeb.ZenSuite. org and sign in to your [employer] account. Enter Y228 in the Privia box to learn more about \"Hip Pain: Care Instructions. \"     If you do not have an account, please click on the \"Sign Up Now\" link. Current as of: February 26, 2020               Content Version: 12.8  © 2006-2021 Appurify. Care instructions adapted under license by [facility] ([facility]). If you have questions about a medical condition or this instruction, always ask your healthcare professional. [address] any warranty or liability for your use of this information. Patient Education        Hip Arthritis: Care Instructions  Your Care Instructions     Arthritis, also called osteoarthritis, is a breakdown of the tissue (cartilage) that cushions your joints. Many people have some arthritis as they age. When the cartilage in your hip joints wears down, your hip bone rubs against the hip socket. This causes pain and stiffness. Work with your doctor to find the right mix of treatments for your arthritis. There are things you can do at home to protect your hip joints, ease your pain, and help you stay active. But if your arthritis becomes so bad that you cannot walk, you may need surgery to replace the hip joint. Follow-up care is a key part of your treatment and safety. Be sure to make and go to all appointments, and call your doctor if you are having problems. It's also a good idea to know your test results and keep a list of the medicines you take. How can you care for yourself at home? · Stay at a healthy weight. Being overweight puts extra strain on your hip joints. · Talk to your doctor or physical therapist about exercises that will help ease hip pain. These tips may help. ? Stretch to help prevent stiffness and to prevent injury before you exercise. You may enjoy gentle forms of yoga to help keep your joints and muscles flexible. ? Walk instead of jog. Other types of exercise that are less stressful on the joints include riding a bike, swimming, and doing water exercise. ? Lift weights. Strong muscles help reduce stress on your joints.  Stronger thigh muscles, for example, take some of the stress off of the knees and hips. Learn the right way to lift weights so you do not make joint pain worse. · Take pain medicines exactly as directed. ? If the doctor gave you a prescription medicine for pain, take it as prescribed. ? If you are not taking a prescription pain medicine, ask your doctor if you can take an over-the-counter medicine. · Use a cane, crutch, walker, or another device if you need help to get around. These can help rest your hips. You also can use other things to make life easier, such as a higher toilet seat. · Do not sit in low chairs, which can make it painful to get up. · Put heat or cold on your sore hips as needed. Use whichever helps you most. You also can go back and forth between hot and cold packs. ? Apply heat 2 or 3 times a day for 20 to 30 minutes--using a heating pad, hot shower, or hot pack--to relieve pain and stiffness. ? Put ice or a cold pack on your sore hips for 10 to 20 minutes at a time to numb the area. Put a thin cloth between the ice and your skin. · Think about talking to your doctor about using capsaicin, a cream you apply to the skin for pain relief. When should you call for help? Call your doctor now or seek immediate medical care if:    · You have sudden swelling, warmth, or pain in any joint.     · You have joint pain and a fever or rash.     · You have such bad pain that you cannot use the joint. Watch closely for changes in your health, and be sure to contact your doctor if:    · You have mild joint symptoms that continue even with more than 6 weeks of care at home.     · You do not get better as expected.     · You have stomach pain or other problems with your medicine. Where can you learn more? Go to https://Hansen And Sonvictorinoeb.Backplane. org and sign in to your [employer] account. Enter X330 in the Transphorm box to learn more about \"Hip Arthritis: Care Instructions. \"     If you do not have an account, please click on the \"Sign Up Now\" link. Current as of: August 5, 2020               Content Version: 12.8  © 2006-2021 Webify Solutions. Care instructions adapted under license by [facility] ([facility]). If you have questions about a medical condition or this instruction, always ask your healthcare professional. [address] any warranty or liability for your use of this information. Patient Education        Hip Arthritis: Exercises  Introduction  Here are some examples of exercises for you to try. The exercises may be suggested for a condition or for rehabilitation. Start each exercise slowly. Ease off the exercises if you start to have pain. You will be told when to start these exercises and which ones will work best for you. How to do the exercises  Straight-leg raises to the outside   1. Lie on your side, with your affected hip on top. 2. Tighten the front thigh muscles of your top leg to keep your knee straight. 3. Keep your hip and your leg straight in line with the rest of your body, and keep your knee pointing forward. Do not drop your hip back. 4. Lift your top leg straight up toward the ceiling, about 12 inches off the floor. Hold for about 6 seconds, then slowly lower your leg. 5. Repeat 8 to 12 times. 6. Switch legs and repeat steps 1 through 5, even if only one hip is sore. Straight-leg raises to the inside   1. Lie on your side with your affected hip on the floor. 2. You can either prop up your other leg on a chair, or you can bend that knee and put that foot in front of your other knee. Do not drop your hip back. 3. Tighten the muscles on the front thigh of your bottom leg to straighten that knee. 4. Keep your kneecap pointing forward and your leg straight, and lift your bottom leg up toward the ceiling about 6 inches. Hold for about 6 seconds, then lower slowly. 5. Repeat 8 to 12 times.   6. Switch legs and repeat steps 1 through 5, even if only one hip is sore.    Hip hike   1. Stand sideways on the bottom step of a staircase, and hold on to the banister or wall. 2. Keeping both knees straight, lift your good leg off the step and let it hang down. Then hike your good hip up to the same level as your affected hip or a little higher. 3. Repeat 8 to 12 times. 4. Switch legs and repeat steps 1 through 3, even if only one hip is sore. Bridging   1. Lie on your back with both knees bent. Your knees should be bent about 90 degrees. 2. Then push your feet into the floor, squeeze your buttocks, and lift your hips off the floor until your shoulders, hips, and knees are all in a straight line. 3. Hold for about 6 seconds as you continue to breathe normally, and then slowly lower your hips back down to the floor and rest for up to 10 seconds. 4. Repeat 8 to 12 times. Hamstring stretch (lying down)   1. Lie flat on your back with your legs straight. If you feel discomfort in your back, place a small towel roll under your lower back. 2. Holding the back of your affected leg, lift your leg straight up and toward your body until you feel a stretch at the back of your thigh. 3. Hold the stretch for at least 30 seconds. 4. Repeat 2 to 4 times. 5. Switch legs and repeat steps 1 through 4, even if only one hip is sore. Standing quadriceps stretch   1. If you are not steady on your feet, hold on to a chair, counter, or wall. You can also lie on your stomach or your side to do this exercise. 2. Bend the knee of the leg you want to stretch, and reach behind you to grab the front of your foot or ankle with the hand on the same side. For example, if you are stretching your right leg, use your right hand. 3. Keeping your knees next to each other, pull your foot toward your buttock until you feel a gentle stretch across the front of your hip and down the front of your thigh. Your knee should be pointed directly to the ground, and not out to the side.   4. Hold the stretch for at least 15 to 30 seconds. 5. Repeat 2 to 4 times. 6. Switch legs and repeat steps 1 through 5, even if only one hip is sore. Hip rotator stretch   1. Lie on your back with both knees bent and your feet flat on the floor. 2. Put the ankle of your affected leg on your opposite thigh near your knee. 3. Use your hand to gently push your knee away from your body until you feel a gentle stretch around your hip. 4. Hold the stretch for 15 to 30 seconds. 5. Repeat 2 to 4 times. 6. Repeat steps 1 through 5, but this time use your hand to gently pull your knee toward your opposite shoulder. 7. Switch legs and repeat steps 1 through 6, even if only one hip is sore. Knee-to-chest   1. Lie on your back with your knees bent and your feet flat on the floor. 2. Bring your affected leg to your chest, keeping the other foot flat on the floor (or keeping the other leg straight, whichever feels better on your lower back). 3. Keep your lower back pressed to the floor. Hold for at least 15 to 30 seconds. 4. Relax, and lower the knee to the starting position. 5. Repeat 2 to 4 times. 6. Switch legs and repeat steps 1 through 5, even if only one hip is sore. 7. To get more stretch, put your other leg flat on the floor while pulling your knee to your chest.    Clamshell   1. Lie on your side, with your affected hip on top. Keep your feet and knees together and your knees bent. 2. Raise your top knee, but keep your feet together. Do not let your hips roll back. Your legs should open up like a clamshell. 3. Hold for 6 seconds. 4. Slowly lower your knee back down. Rest for 10 seconds. 5. Repeat 8 to 12 times. 6. Switch legs and repeat steps 1 through 5, even if only one hip is sore. Follow-up care is a key part of your treatment and safety. Be sure to make and go to all appointments, and call your doctor if you are having problems.  It's also a good idea to know your test results and keep a list of the medicines you take. Where can you learn more? Go to https://chpepiceweb.healthUTOPY. org and sign in to your YOOWALKt account. Enter X979 in the [facility] box to learn more about \"Hip Arthritis: Exercises. \"     If you do not have an account, please click on the \"Sign Up Now\" link. Current as of: November 16, 2020               Content Version: 12.8  © 2006-2021 Healthwise, Incorporated. Care instructions adapted under license by [facility] ([facility]). If you have questions about a medical condition or this instruction, always ask your healthcare professional. [address] any warranty or liability for your use of this information.

## 2021-04-20 NOTE — PROGRESS NOTES
The 10-year ASCVD risk score (Melina Murillo et al., 2013) is: 5.8%    Values used to calculate the score:      Age: 62 years      Sex: Female      Is Non- : No      Diabetic: Yes      Tobacco smoker: No      Systolic Blood Pressure: 663 mmHg      Is BP treated: Yes      HDL Cholesterol: 60 mg/dL      Total Cholesterol: 220 mg/dL  Subjective:      Jose Martin De Santiago is here for follow up of elevated cholesterol, elevated blood pressure, and diabetes. Compliance with treatment has been good. Patient denies muscle pain associated with her medications. She is exercising and is not adherent to a low-salt diet. Blood pressure does not check well controlled at home. Cardiac symptoms: none. Patient denies: chest pain, chest pressure/discomfort, claudication, dyspnea, exertional chest pressure/discomfort, irregular heart beat, lower extremity edema, orthopnea and palpitations. Cardiovascular risk factors: diabetes mellitus, dyslipidemia, hypertension, obesity (BMI >= 30 kg/m2) and sedentary lifestyle. Use of agents associated with hypertension: none. History of target organ damage: none. Current diabetic symptoms include: NA    [facility] exam in [facility]- [facility]  Foot exam in May- sees      Patient denies foot ulcerations, hypoglycemia , nausea, paresthesia of the feet, visual disturbances and vomiting. Evaluation to date has included: fasting blood sugar, fasting lipid panel, hemoglobin A1C and microalbuminuria. Home sugars: BGs consistently in an acceptable range. Current treatments: no recent interventions. Last dilated eye exam due in 3 weeks. Vasu Cui is a 62 y.o. female who presents with right hip pain. Onset of the symptoms was several weeks ago. Inciting event: none. The patient reports the hip pain is aggravated by walking and is worse after period of inactivity.  Aggravating symptoms include: any weight bearing, going up and down stairs, kneeling, rising after sitting and standing. Patient has had no prior hip problems. Previous visits for this problem: none. Evaluation to date: none. Treatment to date: none. Eye exam in May- [facility]  Foot exam in May- sees Dr.Wolf MORALES shoulder pain- costochondritis- resolved after Manipulation  Patient's medications, allergies, past medical, surgical, social and family histories were reviewed and updated as appropriate. Review of Systems  Constitutional: negative  Respiratory: negative for asthma and cough  Cardiovascular: negative  Gastrointestinal: negative     Patient's medications, allergies, past medical, surgical, social and family histories were reviewed and updated as appropriate. Objective:      /71 (Site: Left Lower Arm, Position: Sitting, Cuff Size: Medium Adult)   Pulse 99   Temp 97.1 °F (36.2 °C) (Temporal)   Ht 5' 3\" (1.6 m)   Wt 193 lb 6.4 oz (87.7 kg)   BMI 34.26 kg/m²   Right hip: positives: pain with flexion, pain with movement of hip, tenderness over greater trochanter and tender post hip and lateral at trochenteric bursa site   Left hip: normal     Neck: no adenopathy, supple, symmetrical, trachea midline and thyroid not enlarged, symmetric, no tenderness/mass/nodules   Lungs: clear to auscultation bilaterally  Heart: regular rate and rhythm, S1, S2 normal, no murmur, click, rub or gallop  Abdomen: soft, non-tender; bowel sounds normal; no masses,  no organomegaly  Extremities: extremities normal, atraumatic, no cyanosis or edema    Lab Review  Lab Results   Component Value Date    CHOL 220 08/04/2020    CHOL 137 06/24/2019    CHOL 205 02/18/2019    HDL 60 08/04/2020    HDL 57 06/24/2019    HDL 55 02/18/2019    LDLDIRECT 156 05/20/2017         Assessment:Plan: 1. Diabetes mellitus Type II, under good control. Reminded to get yearly retinal exam A1c 7.3 Follow up in 3 months   2. Dyslipidemia under good control. Continue dietary measures. Continue regular exercise.        Lipid-lowering medications: Crestor   3. Hypertension, normal blood pressure . Evidence of target organ damage: none. 4. R hip Pain- DJD                     Trochanteric bursitis   Natural history and expected course discussed. Questions answered. Educational materials distributed. Home exercises discussed. X-rays per orders. Follow up in 1 month.     Steroid shot next visit per patient request

## 2021-04-20 NOTE — PROGRESS NOTES
DIABETES and HYPERTENSION visit    BP Readings from Last 3 Encounters:   04/20/21 105/71   02/17/21 108/60   02/17/21 101/64        Hemoglobin A1C (%)   Date Value   12/08/2020 7.9   08/04/2020 7.5   09/26/2019 7.4     Microalb/Crt. Ratio (mcg/mg creat)   Date Value   12/13/2019 CANNOT BE CALCULATED     LDL Cholesterol (mg/dL)   Date Value   08/04/2020 114     HDL (mg/dL)   Date Value   08/04/2020 60     BUN (mg/dL)   Date Value   02/04/2021 25 (H)     CREATININE (mg/dL)   Date Value   02/04/2021 1.16 (H)     Glucose (mg/dL)   Date Value   02/04/2021 90   05/19/2012 137 (H)            Have you changed or started any medications since your last visit including any over-the-counter medicines, vitamins, or herbal medicines? no   Have you stopped taking any of your medications? Is so, why? -  yes - see list  Are you having any side effects from any of your medications? - no    Have you seen any other physician or provider since your last visit?  no   Have you had any other diagnostic tests since your last visit?  no   Have you been seen in the emergency room and/or had an admission in a hospital since we last saw you?  no   Have you had your routine dental cleaning in the past 6 months?  04/2021    Have you had your annual diabetic retinal (eye) exam? No   (ensure copy of exam is in the chart)    Do you have an active MyChart account? If no, what is the barrier?   Yes    Patient Care Team:  Tatiana Acosta MD as PCP - General (Family Medicine)  Tatiana Acosta MD as PCP - [facility]  Elva Yarbrough MD as Consulting Physician (Gastroenterology)    Medical History Review  Past Medical, Family, and Social History reviewed and does not contribute to the patient presenting condition    Health Maintenance   Topic Date Due    Shingles Vaccine (1 of 2) Never done    Diabetic retinal exam  01/12/2017    Diabetic microalbuminuria test  12/13/2020    DTaP/Tdap/Td vaccine (2 - Tdap) 12/15/2020    Diabetic foot exam 11/12/2021 (Originally 12/1/2017)    Lipid screen  08/04/2021    A1C test (Diabetic or Prediabetic)  12/08/2021    Potassium monitoring  02/04/2022    Creatinine monitoring  02/04/2022    Colon cancer screen colonoscopy  09/20/2022    Breast cancer screen  11/14/2022    Flu vaccine  Completed    Pneumococcal 0-64 years Vaccine  Completed    COVID-19 Vaccine  Completed    Hepatitis C screen  Completed    HIV screen  Completed    Hepatitis A vaccine  Aged Out    Hib vaccine  Aged Out    Meningococcal (ACWY) vaccine  Aged Out

## 2021-04-20 NOTE — LETTER
[address]  [address]  [address] [facility] [email or web address] [address]  Phone: [phone]  Fax: [phone]    Tea Lopez MD        April 20, 2021    To whom it may concern,  This is to say that Royer Matthews may be excused from jury duty as she has R hip pain and restless leg syndrome.     Sincerely,        Tea Lopez MD

## 2021-04-21 ENCOUNTER — TELEPHONE (OUTPATIENT)
Dept: FAMILY MEDICINE CLINIC | Age: 61
End: 2021-04-21

## 2021-04-21 DIAGNOSIS — N18.31 STAGE 3A CHRONIC KIDNEY DISEASE (HCC): Primary | ICD-10-CM

## 2021-04-21 NOTE — TELEPHONE ENCOUNTER
----- Message from Tana Ortiz MD sent at 4/21/2021  2:43 PM EDT -----  Spoke to Melissa about her being referred to Nephrology. She voiced understanding.  Please mail her her referral.

## 2021-05-05 DIAGNOSIS — E11.9 TYPE 2 DIABETES MELLITUS WITHOUT COMPLICATION, WITHOUT LONG-TERM CURRENT USE OF INSULIN (HCC): ICD-10-CM

## 2021-05-05 RX ORDER — DULAGLUTIDE 1.5 MG/.5ML
INJECTION, SOLUTION SUBCUTANEOUS
Qty: 1 PEN | Refills: 2 | Status: SHIPPED | OUTPATIENT
Start: 2021-05-05 | End: 2022-01-03

## 2021-05-05 NOTE — TELEPHONE ENCOUNTER
E-scribe request for trulicity. Please review and e-scribe if applicable. Last Visit Date:  04/20/2021  Next Visit Date:  5/20/2021    Hemoglobin A1C (%)   Date Value   04/20/2021 7.3   12/08/2020 7.9   08/04/2020 7.5             ( goal A1C is < 7)   Microalb/Crt.  Ratio (mcg/mg creat)   Date Value   04/20/2021 CANNOT BE CALCULATED     LDL Cholesterol (mg/dL)   Date Value   04/20/2021 48       (goal LDL is <100)   AST (U/L)   Date Value   02/04/2021 18     ALT (U/L)   Date Value   02/04/2021 20     BUN (mg/dL)   Date Value   04/20/2021 28 (H)     BP Readings from Last 3 Encounters:   04/20/21 105/71   02/17/21 108/60   02/17/21 101/64          (goal 120/80)        Patient Active Problem List:     Cervical spondylosis     Type II or unspecified type diabetes mellitus without mention of complication, not stated as uncontrolled     Hypertension     Abnormal auditory perception     Eustachian tube dysfunction     Chronic serous otitis media     Nasal polyps     Nasal congestion     Osteoarthritis of left knee     Osteoarthritis of right knee     DIEGO (nonalcoholic steatohepatitis)     Vitamin D deficiency     Iron deficiency anemia     Dyslipidemia     Diabetes mellitus type 2, uncontrolled (HCC)     Left hip pain     Trochanteric bursitis     Fatigue     Daytime somnolence     Snoring     Chronic left shoulder pain     Restless legs syndrome     Generalized anxiety disorder     Primary osteoarthritis, left shoulder     Tendinopathy of left shoulder     Adhesive capsulitis of left shoulder      ----Anne Hill

## 2021-05-06 ENCOUNTER — TELEPHONE (OUTPATIENT)
Dept: FAMILY MEDICINE CLINIC | Age: 61
End: 2021-05-06

## 2021-05-06 NOTE — TELEPHONE ENCOUNTER
Received call from Darnell Mccoy at [facility] requesting if they can fill 6 pens which would last 84 days. They received a script yesterday for only One Pen. Please review and advise Thank you .

## 2021-05-07 NOTE — TELEPHONE ENCOUNTER
Attempted to call Hannah Hendricks at [facility] but the pharmacy was closed will have to follow up on 05//10/21

## 2021-05-14 ENCOUNTER — TELEPHONE (OUTPATIENT)
Dept: FAMILY MEDICINE CLINIC | Age: 61
End: 2021-05-14

## 2021-05-14 NOTE — TELEPHONE ENCOUNTER
vm full, attempted to contact patient per provider to see if she could come in at [address] [place] [address].

## 2021-05-20 ENCOUNTER — OFFICE VISIT (OUTPATIENT)
Dept: FAMILY MEDICINE CLINIC | Age: 61
End: 2021-05-20
Payer: COMMERCIAL

## 2021-05-20 VITALS
HEIGHT: 63 IN | BODY MASS INDEX: 34.3 KG/M2 | HEART RATE: 90 BPM | WEIGHT: 193.6 LBS | TEMPERATURE: 98.1 F | SYSTOLIC BLOOD PRESSURE: 95 MMHG | DIASTOLIC BLOOD PRESSURE: 60 MMHG

## 2021-05-20 DIAGNOSIS — M70.61 TROCHANTERIC BURSITIS OF RIGHT HIP: Primary | ICD-10-CM

## 2021-05-20 PROCEDURE — 99214 OFFICE O/P EST MOD 30 MIN: CPT | Performed by: FAMILY MEDICINE

## 2021-05-20 PROCEDURE — 6360000002 HC RX W HCPCS

## 2021-05-20 PROCEDURE — 96374 THER/PROPH/DIAG INJ IV PUSH: CPT | Performed by: FAMILY MEDICINE

## 2021-05-20 RX ORDER — TRIAMCINOLONE ACETONIDE 40 MG/ML
40 INJECTION, SUSPENSION INTRA-ARTICULAR; INTRAMUSCULAR ONCE
Status: COMPLETED | OUTPATIENT
Start: 2021-05-20 | End: 2021-05-20

## 2021-05-20 RX ORDER — LIDOCAINE HYDROCHLORIDE 10 MG/ML
5 INJECTION, SOLUTION INFILTRATION; PERINEURAL ONCE
Status: COMPLETED | OUTPATIENT
Start: 2021-05-20 | End: 2021-05-20

## 2021-05-20 RX ORDER — TRIAMCINOLONE ACETONIDE 40 MG/ML
40 INJECTION, SUSPENSION INTRA-ARTICULAR; INTRAMUSCULAR ONCE
Qty: 1 ML | Refills: 0 | Status: CANCELLED | OUTPATIENT
Start: 2021-05-20 | End: 2021-05-20

## 2021-05-20 RX ORDER — LIDOCAINE HYDROCHLORIDE 10 MG/ML
5 INJECTION, SOLUTION INFILTRATION; PERINEURAL ONCE
Qty: 5 ML | Refills: 0 | Status: CANCELLED | OUTPATIENT
Start: 2021-05-20 | End: 2021-05-20

## 2021-05-20 RX ADMIN — LIDOCAINE HYDROCHLORIDE 5 ML: 10 INJECTION, SOLUTION INFILTRATION; PERINEURAL at 10:04

## 2021-05-20 RX ADMIN — TRIAMCINOLONE ACETONIDE 40 MG: 40 INJECTION, SUSPENSION INTRA-ARTICULAR; INTRAMUSCULAR at 10:05

## 2021-05-20 NOTE — PATIENT INSTRUCTIONS
Thank you for letting us take care of you today. We hope all your questions were addressed. If a question was overlooked or something else comes to mind after you return home, please contact a member of your Care Team listed below. Your Care Team at [place] [address] is Team #1  Anjali Torres MD (Faculty)  James Ruelas (Resident)  Myrna Riojas MD (Resident)  Sarah Mar, SHERRY Mendo[facility] office)  Mj Middleton, [address] (Clinical Practice Manager)  Amparo Gupta [facility] (Clinical Pharmacist)     Office phone number: [phone]    If you need to get in right away due to illness, please be advised we have \"Same Day\" appointments available Monday-Friday. Please call us at [phone] option #3 to schedule your \"Same Day\" appointment.

## 2021-05-20 NOTE — PROGRESS NOTES
Subjective:       Og Hunter is an 62 y.o. female presents for evaluation of right hip pain. Patient has a a few months history of pain in the area of the right hip. The pain is sharp at times and is on the outside of the hip. There is no radiation down the leg. The patient has not had this type of problem before. The patient does not exercise regularly. There was no known injury to the affected area. Onset was gradual. The symptoms are of moderate in severity. They are made worse by: walking. They are helped by OTC pain medications (Tylenol). Previous treatments include OTC meds. Limitation on activities include difficulty with walking. Associated symptoms include morning stiffness. Patient denies associated alopecia, arthralgia, bleeding/clotting problems, depression, fatigue, fevers, memory loss and muscle weakness  Patient's medications, allergies, past medical, surgical, social and family histories were reviewed and updated as appropriate. Review of Systems  Constitutional: negative  Respiratory: negative  Cardiovascular: negative  Gastrointestinal: negative  Musculoskeletal:positive for hip pain worse on laying on R side/ Climbing the steps and getting into the car. Objective:      BP 95/60 (Site: Left Lower Arm, Position: Sitting, Cuff Size: Medium Adult) Comment: machine  Pulse 90   Temp 98.1 °F (36.7 °C) (Temporal)   Ht 5' 2.99\" (1.6 m)   Wt 193 lb 9.6 oz (87.8 kg)   BMI 34.30 kg/m²   General appearance: alert, appears stated age and cooperative  Lungs: clear to auscultation bilaterally  Heart: regular rate and rhythm, S1, S2 normal, no murmur, click, rub or gallop  Abdomen: soft, non-tender; bowel sounds normal; no masses,  no organomegaly  Extremities: extremities normal, atraumatic, no cyanosis or edema  R hip tender lat upper thigh. Imaging  X-rays: 2 views of the hip show no evidence of fracture or severe arthritic changes about the femoral head or acetabulum. Assessment:      Right trochanteric bursitis      Plan:      1. I explained the condition to the patient. 2. All questions answered. 3. We discussed the option of using a steroid injection to settle the inflammation- injected. 4. I recommended regular icing for 20 min, 3x's per day. 5. I offered the use of anti-inflammatory Tylenol to help with the pain. 6. I gave the patient a handout on this condition and instructed them on the exercises. Increasing flexibility should help take the tension off of the IT-band as it crosses the greater trochanter. Follow up in 1 month and as needed. Pre-operative Diagnosis: right trochanteric bursitis    Post-operative Diagnosis: normal    Indications: Symptom relief from inflamed bursa    Anesthesia: Lidocaine 1% without epinephrine without added sodium bicarbonate    Procedure Details     Verbal consent was obtained for the procedure. The point of maximum tenderness was identified and marked. The skin prepped with Betadine and a  A needle was advanced into the right trochanteric bursa point of max. tenderness without difficulty. Injection- Kenallog 40 mg with 5 ml of 1% lidocaine   Complications:  None; patient tolerated the procedure well.

## 2021-05-20 NOTE — PROGRESS NOTES
Visit Information    Have you changed or started any medications since your last visit including any over-the-counter medicines, vitamins, or herbal medicines? no   Have you stopped taking any of your medications? Is so, why? -  no  Are you having any side effects from any of your medications? - no    Have you seen any other physician or provider since your last visit?  no   Have you had any other diagnostic tests since your last visit? yes - Labs, XR   Have you been seen in the emergency room and/or had an admission in a hospital since we last saw you?  no   Have you had your routine dental cleaning in the past 6 months?  no     Do you have an active MyChart account? If no, what is the barrier?   Yes    Patient Care Team:  Jose Mcgee MD as PCP - General (Family Medicine)  Jose Mcgee MD as PCP - [facility]  Callie Gallo MD as Consulting Physician (Gastroenterology)    Medical History Review  Past Medical, Family, and Social History reviewed and does not contribute to the patient presenting condition    Health Maintenance   Topic Date Due    Shingles Vaccine (1 of 2) Never done    Diabetic retinal exam  01/12/2017    DTaP/Tdap/Td vaccine (2 - Tdap) 12/15/2020    Diabetic foot exam  11/12/2021 (Originally 12/1/2017)    Potassium monitoring  02/04/2022    A1C test (Diabetic or Prediabetic)  04/20/2022    Diabetic microalbuminuria test  04/20/2022    Lipid screen  04/20/2022    Creatinine monitoring  04/20/2022    Colon cancer screen colonoscopy  09/20/2022    Breast cancer screen  11/14/2022    Pneumococcal 0-64 years Vaccine (2 of 2) 03/10/2026    Flu vaccine  Completed    COVID-19 Vaccine  Completed    Hepatitis C screen  Completed    HIV screen  Completed    Hepatitis A vaccine  Aged Out    Hib vaccine  Aged Out    Meningococcal (ACWY) vaccine  Aged Out

## 2021-06-09 RX ORDER — LISINOPRIL 40 MG/1
TABLET ORAL
Qty: 90 TABLET | Refills: 2 | Status: SHIPPED | OUTPATIENT
Start: 2021-06-09 | End: 2022-03-18 | Stop reason: SDUPTHER

## 2021-06-09 NOTE — TELEPHONE ENCOUNTER
E-scribe request for lisinopril. Please review and e-scribe if applicable. Last Visit Date:  05/20/2021  Next Visit Date:  Visit date not found    Hemoglobin A1C (%)   Date Value   04/20/2021 7.3   12/08/2020 7.9   08/04/2020 7.5             ( goal A1C is < 7)   Microalb/Crt.  Ratio (mcg/mg creat)   Date Value   04/20/2021 CANNOT BE CALCULATED     LDL Cholesterol (mg/dL)   Date Value   04/20/2021 48       (goal LDL is <100)   AST (U/L)   Date Value   02/04/2021 18     ALT (U/L)   Date Value   02/04/2021 20     BUN (mg/dL)   Date Value   04/20/2021 28 (H)     BP Readings from Last 3 Encounters:   05/20/21 95/60   04/20/21 105/71   02/17/21 108/60          (goal 120/80)        Patient Active Problem List:     Cervical spondylosis     Type II or unspecified type diabetes mellitus without mention of complication, not stated as uncontrolled     Hypertension     Abnormal auditory perception     Eustachian tube dysfunction     Chronic serous otitis media     Nasal polyps     Nasal congestion     Osteoarthritis of left knee     Osteoarthritis of right knee     DIEGO (nonalcoholic steatohepatitis)     Vitamin D deficiency     Iron deficiency anemia     Dyslipidemia     Diabetes mellitus type 2, uncontrolled (HCC)     Left hip pain     Trochanteric bursitis     Fatigue     Daytime somnolence     Snoring     Chronic left shoulder pain     Restless legs syndrome     Generalized anxiety disorder     Primary osteoarthritis, left shoulder     Tendinopathy of left shoulder     Adhesive capsulitis of left shoulder      ----Heiid Ballard

## 2021-06-11 ENCOUNTER — HOSPITAL ENCOUNTER (OUTPATIENT)
Age: 61
Setting detail: SPECIMEN
Discharge: HOME | End: 2021-06-11
Payer: COMMERCIAL

## 2021-06-11 DIAGNOSIS — N18.30 BENIGN HYPERTENSION WITH CKD (CHRONIC KIDNEY DISEASE) STAGE III (HCC): ICD-10-CM

## 2021-06-11 DIAGNOSIS — I12.9 BENIGN HYPERTENSION WITH CKD (CHRONIC KIDNEY DISEASE) STAGE III (HCC): ICD-10-CM

## 2021-06-11 DIAGNOSIS — N18.30 SECONDARY DIABETES MELLITUS WITH STAGE 3 CHRONIC KIDNEY DISEASE (HCC): ICD-10-CM

## 2021-06-11 DIAGNOSIS — N18.31 STAGE 3A CHRONIC KIDNEY DISEASE (HCC): ICD-10-CM

## 2021-06-11 DIAGNOSIS — E13.22 SECONDARY DIABETES MELLITUS WITH STAGE 3 CHRONIC KIDNEY DISEASE (HCC): ICD-10-CM

## 2021-06-11 LAB
-: ABNORMAL
ABSOLUTE EOS #: 0.43 K/UL (ref 0–0.44)
ABSOLUTE IMMATURE GRANULOCYTE: 0.03 K/UL (ref 0–0.3)
ABSOLUTE LYMPH #: 3 K/UL (ref 1.1–3.7)
ABSOLUTE MONO #: 0.69 K/UL (ref 0.1–1.2)
AMORPHOUS: ABNORMAL
ANION GAP SERPL CALCULATED.3IONS-SCNC: 14 MMOL/L (ref 9–17)
BACTERIA: ABNORMAL
BASOPHILS # BLD: 1 % (ref 0–2)
BASOPHILS ABSOLUTE: 0.06 K/UL (ref 0–0.2)
BILIRUBIN URINE: NEGATIVE
BUN BLDV-MCNC: 28 MG/DL (ref 8–23)
CALCIUM SERPL-MCNC: 9.1 MG/DL (ref 8.6–10.4)
CASTS UA: ABNORMAL /LPF (ref 0–8)
CHLORIDE BLD-SCNC: 106 MMOL/L (ref 98–107)
CO2: 22 MMOL/L (ref 20–31)
COLOR: YELLOW
CREAT SERPL-MCNC: 1.12 MG/DL (ref 0.5–0.9)
CREATININE URINE: 76 MG/DL (ref 28–217)
CRYSTALS, UA: ABNORMAL /HPF
DIFFERENTIAL TYPE: ABNORMAL
EOSINOPHILS RELATIVE PERCENT: 4 % (ref 1–4)
EPITHELIAL CELLS UA: ABNORMAL /HPF (ref 0–5)
GFR AFRICAN AMERICAN: >60 ML/MIN
GFR NON-AFRICAN AMERICAN: 50 ML/MIN
GFR SERPL CREATININE-BSD FRML MDRD: ABNORMAL ML/MIN/{1.73_M2}
GFR SERPL CREATININE-BSD FRML MDRD: ABNORMAL ML/MIN/{1.73_M2}
GLUCOSE URINE: ABNORMAL
HCT VFR BLD CALC: 36.2 % (ref 36.3–47.1)
HEMOGLOBIN: 10.7 G/DL (ref 11.9–15.1)
IMMATURE GRANULOCYTES: 0 %
KETONES, URINE: NEGATIVE
LEUKOCYTE ESTERASE, URINE: ABNORMAL
LYMPHOCYTES # BLD: 30 % (ref 24–43)
MAGNESIUM: 1.8 MG/DL (ref 1.6–2.6)
MCH RBC QN AUTO: 28.3 PG (ref 25.2–33.5)
MCHC RBC AUTO-ENTMCNC: 29.6 G/DL (ref 28.4–34.8)
MCV RBC AUTO: 95.8 FL (ref 82.6–102.9)
MICROALBUMIN/CREAT 24H UR: <12 MG/L
MICROALBUMIN/CREAT UR-RTO: NORMAL MCG/MG CREAT
MONOCYTES # BLD: 7 % (ref 3–12)
MUCUS: ABNORMAL
NITRITE, URINE: NEGATIVE
NRBC AUTOMATED: 0 PER 100 WBC
OTHER OBSERVATIONS UA: ABNORMAL
PDW BLD-RTO: 12.5 % (ref 11.8–14.4)
PH UA: 5.5 (ref 5–8)
PHOSPHORUS: 3.8 MG/DL (ref 2.6–4.5)
PLATELET # BLD: 325 K/UL (ref 138–453)
PLATELET ESTIMATE: ABNORMAL
PMV BLD AUTO: 10.1 FL (ref 8.1–13.5)
POTASSIUM SERPL-SCNC: 5.3 MMOL/L (ref 3.7–5.3)
PROTEIN UA: NEGATIVE
RBC # BLD: 3.78 M/UL (ref 3.95–5.11)
RBC # BLD: ABNORMAL 10*6/UL
RBC UA: ABNORMAL /HPF (ref 0–4)
RENAL EPITHELIAL, UA: ABNORMAL /HPF
SEG NEUTROPHILS: 58 % (ref 36–65)
SEGMENTED NEUTROPHILS ABSOLUTE COUNT: 5.77 K/UL (ref 1.5–8.1)
SODIUM BLD-SCNC: 142 MMOL/L (ref 135–144)
SPECIFIC GRAVITY UA: 1.02 (ref 1–1.03)
TRICHOMONAS: ABNORMAL
TURBIDITY: CLEAR
URINE HGB: NEGATIVE
UROBILINOGEN, URINE: NORMAL
VITAMIN D 25-HYDROXY: 16.7 NG/ML (ref 30–100)
WBC # BLD: 10 K/UL (ref 3.5–11.3)
WBC # BLD: ABNORMAL 10*3/UL
WBC UA: ABNORMAL /HPF (ref 0–5)
YEAST: ABNORMAL

## 2021-06-24 LAB
CHOLESTEROL/HDL RATIO: 2.6
CHOLESTEROL: 154 MG/DL
ESTIMATED AVERAGE GLUCOSE: 180 MG/DL
GLUCOSE BLD-MCNC: 233 MG/DL (ref 70–99)
HBA1C MFR BLD: 7.9 % (ref 4–6)
HDLC SERPL-MCNC: 59 MG/DL
LDL CHOLESTEROL: 52 MG/DL (ref 0–130)
PATIENT FASTING?: YES
TRIGL SERPL-MCNC: 217 MG/DL
VLDLC SERPL CALC-MCNC: ABNORMAL MG/DL (ref 1–30)

## 2021-07-21 ENCOUNTER — HOSPITAL ENCOUNTER (OUTPATIENT)
Age: 61
Setting detail: SPECIMEN
Discharge: HOME | End: 2021-07-21
Payer: COMMERCIAL

## 2021-07-21 DIAGNOSIS — I12.9 BENIGN HYPERTENSION WITH CKD (CHRONIC KIDNEY DISEASE) STAGE III (HCC): ICD-10-CM

## 2021-07-21 DIAGNOSIS — N18.31 STAGE 3A CHRONIC KIDNEY DISEASE (HCC): ICD-10-CM

## 2021-07-21 DIAGNOSIS — E87.5 HYPERKALEMIA: ICD-10-CM

## 2021-07-21 DIAGNOSIS — N18.30 SECONDARY DIABETES MELLITUS WITH STAGE 3 CHRONIC KIDNEY DISEASE (HCC): ICD-10-CM

## 2021-07-21 DIAGNOSIS — E13.22 SECONDARY DIABETES MELLITUS WITH STAGE 3 CHRONIC KIDNEY DISEASE (HCC): ICD-10-CM

## 2021-07-21 DIAGNOSIS — N18.30 BENIGN HYPERTENSION WITH CKD (CHRONIC KIDNEY DISEASE) STAGE III (HCC): ICD-10-CM

## 2021-07-21 DIAGNOSIS — E55.9 VITAMIN D DEFICIENCY: ICD-10-CM

## 2021-07-21 LAB
ABSOLUTE EOS #: 0.57 K/UL (ref 0–0.44)
ABSOLUTE IMMATURE GRANULOCYTE: 0.05 K/UL (ref 0–0.3)
ABSOLUTE LYMPH #: 2.72 K/UL (ref 1.1–3.7)
ABSOLUTE MONO #: 0.74 K/UL (ref 0.1–1.2)
ANION GAP SERPL CALCULATED.3IONS-SCNC: 13 MMOL/L (ref 9–17)
BASOPHILS # BLD: 1 % (ref 0–2)
BASOPHILS ABSOLUTE: 0.07 K/UL (ref 0–0.2)
BUN BLDV-MCNC: 30 MG/DL (ref 8–23)
BUN/CREAT BLD: ABNORMAL (ref 9–20)
CALCIUM SERPL-MCNC: 9.1 MG/DL (ref 8.6–10.4)
CHLORIDE BLD-SCNC: 102 MMOL/L (ref 98–107)
CO2: 23 MMOL/L (ref 20–31)
CREAT SERPL-MCNC: 1.01 MG/DL (ref 0.5–0.9)
DIFFERENTIAL TYPE: ABNORMAL
EOSINOPHILS RELATIVE PERCENT: 6 % (ref 1–4)
GFR AFRICAN AMERICAN: >60 ML/MIN
GFR NON-AFRICAN AMERICAN: 56 ML/MIN
GFR SERPL CREATININE-BSD FRML MDRD: ABNORMAL ML/MIN/{1.73_M2}
GFR SERPL CREATININE-BSD FRML MDRD: ABNORMAL ML/MIN/{1.73_M2}
GLUCOSE BLD-MCNC: 159 MG/DL (ref 70–99)
HCT VFR BLD CALC: 37.1 % (ref 36.3–47.1)
HEMOGLOBIN: 11.1 G/DL (ref 11.9–15.1)
IMMATURE GRANULOCYTES: 1 %
LYMPHOCYTES # BLD: 27 % (ref 24–43)
MAGNESIUM: 1.6 MG/DL (ref 1.6–2.6)
MCH RBC QN AUTO: 27.8 PG (ref 25.2–33.5)
MCHC RBC AUTO-ENTMCNC: 29.9 G/DL (ref 28.4–34.8)
MCV RBC AUTO: 92.8 FL (ref 82.6–102.9)
MONOCYTES # BLD: 7 % (ref 3–12)
NRBC AUTOMATED: 0 PER 100 WBC
PDW BLD-RTO: 13 % (ref 11.8–14.4)
PHOSPHORUS: 3.1 MG/DL (ref 2.6–4.5)
PLATELET # BLD: 342 K/UL (ref 138–453)
PLATELET ESTIMATE: ABNORMAL
PMV BLD AUTO: 10.3 FL (ref 8.1–13.5)
POTASSIUM SERPL-SCNC: 4.6 MMOL/L (ref 3.7–5.3)
RBC # BLD: 4 M/UL (ref 3.95–5.11)
RBC # BLD: ABNORMAL 10*6/UL
SEG NEUTROPHILS: 58 % (ref 36–65)
SEGMENTED NEUTROPHILS ABSOLUTE COUNT: 6.03 K/UL (ref 1.5–8.1)
SODIUM BLD-SCNC: 138 MMOL/L (ref 135–144)
VITAMIN D 25-HYDROXY: 19.9 NG/ML (ref 30–100)
WBC # BLD: 10.2 K/UL (ref 3.5–11.3)
WBC # BLD: ABNORMAL 10*3/UL

## 2021-07-23 DIAGNOSIS — G25.81 RESTLESS LEGS SYNDROME: ICD-10-CM

## 2021-07-25 NOTE — TELEPHONE ENCOUNTER
Escribe Request for pending medication. Last Visit Date: 5/20/21  Next Visit Date:  No future appointments. Health Maintenance   Topic Date Due    Shingles Vaccine (1 of 2) Never done    Diabetic retinal exam  01/12/2017    DTaP/Tdap/Td vaccine (2 - Tdap) 12/15/2020    Diabetic foot exam  11/12/2021 (Originally 12/1/2017)    Flu vaccine (1) 09/01/2021    Diabetic microalbuminuria test  06/11/2022    A1C test (Diabetic or Prediabetic)  06/24/2022    Lipid screen  06/24/2022    Potassium monitoring  07/21/2022    Creatinine monitoring  07/21/2022    Colon cancer screen colonoscopy  09/20/2022    Breast cancer screen  11/14/2022    Pneumococcal 0-64 years Vaccine (2 of 2 - PPSV23) 03/10/2026    COVID-19 Vaccine  Completed    Hepatitis C screen  Completed    HIV screen  Completed    Hepatitis A vaccine  Aged Out    Hib vaccine  Aged Out    Meningococcal (ACWY) vaccine  Aged Out       Hemoglobin A1C (%)   Date Value   06/24/2021 7.9 (H)   04/20/2021 7.3   12/08/2020 7.9             ( goal A1C is < 7)   Microalb/Crt. Ratio (mcg/mg creat)   Date Value   06/11/2021 CANNOT BE CALCULATED     LDL Cholesterol (mg/dL)   Date Value   06/24/2021 52       (goal LDL is <100)   AST (U/L)   Date Value   02/04/2021 18     ALT (U/L)   Date Value   02/04/2021 20     BUN (mg/dL)   Date Value   07/21/2021 30 (H)     BP Readings from Last 3 Encounters:   06/16/21 114/68   05/20/21 95/60   04/20/21 105/71          (goal 120/80)    All Future Testing planned in [facility]  Lab Frequency Next Occurrence   EKG 12 Lead Once 02/09/2021   COVID-19 Once 02/12/2021       Next Visit Date:  No future appointments.       Patient Active Problem List:     Cervical spondylosis     Type II or unspecified type diabetes mellitus without mention of complication, not stated as uncontrolled     Hypertension     Abnormal auditory perception     Eustachian tube dysfunction     Chronic serous otitis media     Nasal polyps     Nasal congestion     Osteoarthritis of left knee     Osteoarthritis of right knee     DIEGO (nonalcoholic steatohepatitis)     Vitamin D deficiency     Iron deficiency anemia     Dyslipidemia     Diabetes mellitus type 2, uncontrolled (HCC)     Left hip pain     Trochanteric bursitis     Fatigue     Daytime somnolence     Snoring     Chronic left shoulder pain     Restless legs syndrome     Generalized anxiety disorder     Primary osteoarthritis, left shoulder     Tendinopathy of left shoulder     Adhesive capsulitis of left shoulder

## 2021-07-26 DIAGNOSIS — G25.81 RESTLESS LEGS SYNDROME: ICD-10-CM

## 2021-07-26 RX ORDER — ROPINIROLE 2 MG/1
TABLET, FILM COATED ORAL
Qty: 180 TABLET | Refills: 3 | Status: SHIPPED | OUTPATIENT
Start: 2021-07-26 | End: 2021-07-26 | Stop reason: SDUPTHER

## 2021-07-26 RX ORDER — ROPINIROLE 2 MG/1
TABLET, FILM COATED ORAL
Qty: 180 TABLET | Refills: 3 | Status: ON HOLD | OUTPATIENT
Start: 2021-07-26 | End: 2022-06-09 | Stop reason: HOSPADM

## 2021-07-26 RX ORDER — OMEPRAZOLE 20 MG/1
20 CAPSULE, DELAYED RELEASE ORAL 2 TIMES DAILY
Qty: 180 CAPSULE | Refills: 3 | Status: SHIPPED | OUTPATIENT
Start: 2021-07-26

## 2021-07-26 RX ORDER — OMEPRAZOLE 20 MG/1
20 CAPSULE, DELAYED RELEASE ORAL 2 TIMES DAILY
Qty: 180 CAPSULE | Refills: 3 | Status: CANCELLED | OUTPATIENT
Start: 2021-07-26

## 2021-07-26 RX ORDER — ASPIRIN 81 MG/1
TABLET ORAL
Qty: 30 TABLET | Refills: 2 | Status: SHIPPED | OUTPATIENT
Start: 2021-07-26 | End: 2021-12-13

## 2021-07-26 NOTE — TELEPHONE ENCOUNTER
Chronic serous otitis media     Nasal polyps     Nasal congestion     Osteoarthritis of left knee     Osteoarthritis of right knee     DIEGO (nonalcoholic steatohepatitis)     Vitamin D deficiency     Iron deficiency anemia     Dyslipidemia     Diabetes mellitus type 2, uncontrolled (HCC)     Left hip pain     Trochanteric bursitis     Fatigue     Daytime somnolence     Snoring     Chronic left shoulder pain     Restless legs syndrome     Generalized anxiety disorder     Primary osteoarthritis, left shoulder     Tendinopathy of left shoulder     Adhesive capsulitis of left shoulder

## 2021-07-31 LAB
RENIN ACTIVITY: 27 NG/ML/HR
RENIN COMMENT: NORMAL

## 2021-08-13 ENCOUNTER — NURSE TRIAGE (OUTPATIENT)
Dept: OTHER | Facility: CLINIC | Age: 61
End: 2021-08-13

## 2021-08-13 ENCOUNTER — HOSPITAL ENCOUNTER (EMERGENCY)
Age: 61
Discharge: HOME | End: 2021-08-14
Attending: STUDENT IN AN ORGANIZED HEALTH CARE EDUCATION/TRAINING PROGRAM
Payer: COMMERCIAL

## 2021-08-13 ENCOUNTER — APPOINTMENT (OUTPATIENT)
Dept: CT IMAGING | Age: 61
End: 2021-08-13
Payer: COMMERCIAL

## 2021-08-13 DIAGNOSIS — M54.40 LOW BACK PAIN WITH SCIATICA, SCIATICA LATERALITY UNSPECIFIED, UNSPECIFIED BACK PAIN LATERALITY, UNSPECIFIED CHRONICITY: Primary | ICD-10-CM

## 2021-08-13 PROCEDURE — 96372 THER/PROPH/DIAG INJ SC/IM: CPT

## 2021-08-13 PROCEDURE — 6370000000 HC RX 637 (ALT 250 FOR IP): Performed by: EMERGENCY MEDICINE

## 2021-08-13 PROCEDURE — 99285 EMERGENCY DEPT VISIT HI MDM: CPT

## 2021-08-13 PROCEDURE — 6360000002 HC RX W HCPCS: Performed by: EMERGENCY MEDICINE

## 2021-08-13 PROCEDURE — 72131 CT LUMBAR SPINE W/O DYE: CPT

## 2021-08-13 RX ORDER — GABAPENTIN 300 MG/1
300 CAPSULE ORAL 3 TIMES DAILY
Status: DISCONTINUED | OUTPATIENT
Start: 2021-08-13 | End: 2021-08-13

## 2021-08-13 RX ORDER — OXYCODONE HYDROCHLORIDE 5 MG/1
5 TABLET ORAL ONCE
Status: COMPLETED | OUTPATIENT
Start: 2021-08-13 | End: 2021-08-13

## 2021-08-13 RX ORDER — GABAPENTIN 300 MG/1
300 CAPSULE ORAL ONCE
Status: COMPLETED | OUTPATIENT
Start: 2021-08-14 | End: 2021-08-14

## 2021-08-13 RX ORDER — PREDNISONE 20 MG/1
60 TABLET ORAL ONCE
Status: COMPLETED | OUTPATIENT
Start: 2021-08-13 | End: 2021-08-13

## 2021-08-13 RX ORDER — ACETAMINOPHEN 325 MG/1
650 TABLET ORAL ONCE
Status: COMPLETED | OUTPATIENT
Start: 2021-08-13 | End: 2021-08-13

## 2021-08-13 RX ORDER — KETOROLAC TROMETHAMINE 30 MG/ML
30 INJECTION, SOLUTION INTRAMUSCULAR; INTRAVENOUS ONCE
Status: COMPLETED | OUTPATIENT
Start: 2021-08-13 | End: 2021-08-13

## 2021-08-13 RX ORDER — CYCLOBENZAPRINE HCL 10 MG
10 TABLET ORAL ONCE
Status: COMPLETED | OUTPATIENT
Start: 2021-08-14 | End: 2021-08-14

## 2021-08-13 RX ORDER — DIAZEPAM 5 MG/1
5 TABLET ORAL ONCE
Status: COMPLETED | OUTPATIENT
Start: 2021-08-13 | End: 2021-08-13

## 2021-08-13 RX ORDER — LIDOCAINE 4 G/G
1 PATCH TOPICAL DAILY
Status: DISCONTINUED | OUTPATIENT
Start: 2021-08-13 | End: 2021-08-14 | Stop reason: HOSPADM

## 2021-08-13 RX ORDER — MORPHINE SULFATE 4 MG/ML
4 INJECTION, SOLUTION INTRAMUSCULAR; INTRAVENOUS ONCE
Status: COMPLETED | OUTPATIENT
Start: 2021-08-13 | End: 2021-08-13

## 2021-08-13 RX ADMIN — PREDNISONE 60 MG: 20 TABLET ORAL at 19:34

## 2021-08-13 RX ADMIN — ACETAMINOPHEN 650 MG: 325 TABLET ORAL at 20:35

## 2021-08-13 RX ADMIN — Medication 4 MG: at 22:56

## 2021-08-13 RX ADMIN — KETOROLAC TROMETHAMINE 30 MG: 30 INJECTION, SOLUTION INTRAMUSCULAR; INTRAVENOUS at 19:31

## 2021-08-13 RX ADMIN — OXYCODONE HYDROCHLORIDE 5 MG: 5 TABLET ORAL at 21:26

## 2021-08-13 RX ADMIN — DIAZEPAM 5 MG: 5 TABLET ORAL at 20:35

## 2021-08-13 ASSESSMENT — PAIN DESCRIPTION - ORIENTATION
ORIENTATION: LEFT;LOWER
ORIENTATION: LEFT

## 2021-08-13 ASSESSMENT — PAIN SCALES - GENERAL
PAINLEVEL_OUTOF10: 10

## 2021-08-13 ASSESSMENT — ENCOUNTER SYMPTOMS
DIARRHEA: 0
SHORTNESS OF BREATH: 0
BACK PAIN: 1
ABDOMINAL PAIN: 0

## 2021-08-13 ASSESSMENT — PAIN DESCRIPTION - LOCATION
LOCATION: BACK
LOCATION: BACK

## 2021-08-13 ASSESSMENT — PAIN DESCRIPTION - PAIN TYPE: TYPE: ACUTE PAIN

## 2021-08-13 ASSESSMENT — PAIN DESCRIPTION - FREQUENCY: FREQUENCY: CONTINUOUS

## 2021-08-13 ASSESSMENT — PAIN DESCRIPTION - DESCRIPTORS: DESCRIPTORS: ACHING

## 2021-08-13 NOTE — ED PROVIDER NOTES
EMERGENCY DEPARTMENT ENCOUNTER   ATTENDING ATTESTATION     Pt Name: Radha Watson  MRN: [id number]  [place] 1960  Date of evaluation: 8/13/21       Radha Watson is a 62 y.o. female who presents with Back Pain      MDM:   26-year-old female presented for evaluation of lower back pain. Present for the past 2 months or so but acutely worsening today. Is having some radicular type symptoms with left foot numbness. No previous cross-sectional imaging. Plan for CT scan, pain control, discharge if negative. Vitals:   Vitals:    08/13/21 1851   BP: (!) 107/54   Pulse: 91   Resp: 16   Temp: 98 °F (36.7 °C)   SpO2: 95%         I personally evaluated and examined the patient in conjunction with the resident and agree with the assessment, treatment plan, and disposition of the patient as recorded by the resident. I performed a history and physical examination of the patient and discussed management with the resident. I reviewed the residents note and agree with the documented findings and plan of care. Any areas of disagreement are noted on the chart. I was personally present for the key portions of any procedures. I have documented in the chart those procedures where I was not present during the key portions. I have personally reviewed all images and agree with the resident's interpretation. I have reviewed the emergency nurses triage note. I agree with the chief complaint, past medical history, past surgical history, allergies, medications, social and family history as documented unless otherwise noted.     Tom Ly MD  Attending Emergency Physician            Tom Ly MD  08/13/21 [id number]

## 2021-08-13 NOTE — TELEPHONE ENCOUNTER
Reason for Disposition   [1] SEVERE back pain (e.g., excruciating) AND [2] sudden onset AND [3] age > 61    Answer Assessment - Initial Assessment Questions  1. ONSET: \"When did the pain begin? \"       Wednesday, 8/11/21    2. LOCATION: \"Where does it hurt? \" (upper, mid or lower back)    L buttock very painful and goes down her L leg, posterior all the way to her ankle    3. SEVERITY: \"How bad is the pain? \"  (e.g., Scale 1-10; mild, moderate, or severe)    - MILD (1-3): doesn't interfere with normal activities     - MODERATE (4-7): interferes with normal activities or awakens from sleep     - SEVERE (8-10): excruciating pain, unable to do any normal activities       Severe rated at a 10    4. PATTERN: \"Is the pain constant? \" (e.g., yes, no; constant, intermittent)       Pain is constant    5. RADIATION: \"Does the pain shoot into your legs or elsewhere? \"      Goes down her leg    6. CAUSE:  \"What do you think is causing the back pain? \"       sciatic nerve pain    7. BACK OVERUSE:  Sallie Ravi recent lifting of heavy objects, strenuous work or exercise? \"      Doesn't think she did anything to it, it just happened    8. MEDICATIONS: \"What have you taken so far for the pain? \" (e.g., nothing, acetamin   No pain meds such as Norco or hydrocodone did anything for the pain    9. NEUROLOGIC SYMPTOMS: \"Do you have any weakness, numbness, or problems with bowel/bladder control? \"      Some numbness by her ankle    10. OTHER SYMPTOMS: \"Do you have any other symptoms? \" (e.g., fever, abdominal pain, burning with urination, blood in urine)  none          11. PREGNANCY: \"Is there any chance you are pregnant? \" (e.g., yes, no; LMP)       n/a    Protocols used: BACK PAIN-ADULT-AH    Brief description of triage: pt reports extreme pain d/t sciatic nerve pain in L buttock, down the posterior L leg. PO pain meds are not effective     Triage indicates for patient to go to ED now.     Care advice provided, patient verbalizes understanding; denies any other questions or concerns; instructed to call back for any new or worsening symptoms. Attention Provider: Thank you for allowing me to participate in the care of your patient. The patient was connected to triage in response to symptoms provided. Please do not respond through this encounter as the response is not directed to a shared pool.

## 2021-08-13 NOTE — ED PROVIDER NOTES
[address]  Emergency Department Encounter  EmergencyMedicine Resident     Pt Name:Kavya Albright  MRN: [id number]  [place] 1960  Date of evaluation: 21  PCP:  Jerome Mccollum MD    CHIEF COMPLAINT       Chief Complaint   Patient presents with    Back Pain       HISTORY OF PRESENT ILLNESS  (Location/Symptom, Timing/Onset, Context/Setting, Quality, Duration, Modifying Factors, Severity.)      Amarilys Jones is a 62 y.o. female who presents with left-sided lower back pain that radiates into the leg described as sharp constant pain. Patient was experiencing shooting pain, and has had dull achy pain x2 months has been progressively worsening, it acutely worsened in the last 2 days. Patient denies any change in urination, no incontinence of stool or urine, no numbness in the perineal area, no chest pain, shortness of breath, abdominal pain, fever, chills, sick symptoms. Patient was seen in the bedside, hunched over the bed in obvious acute distress. Patient took a Norco earlier today, also took Tylenol and Motrin with minimal benefit this morning. PAST MEDICAL / SURGICAL / SOCIAL / FAMILY HISTORY      has a past medical history of Cervical spondylosis, Generalized anxiety disorder, Hyperlipidemia, Hypertension, DIEGO (nonalcoholic steatohepatitis), Obesity, Osteoarthritis of left knee, Restless legs syndrome, and Type II or unspecified type diabetes mellitus without mention of complication, not stated as uncontrolled. No additional pertinent     has a past surgical history that includes Upper gastrointestinal endoscopy (2012); Colonoscopy (2012);  section; Hysterectomy, total abdominal (); shoulder surgery (Left, 2021); and shoulder surgery (Left, 2021).   No additional pertinent    Social History     Socioeconomic History    Marital status:      Spouse name: Not on file    Number of children: Not on file    Years of education: Not on file    Highest education level: Not on file   Occupational History    Not on file   Tobacco Use    Smoking status: Never Smoker    Smokeless tobacco: Never Used   Vaping Use    Vaping Use: Never used   Substance and Sexual Activity    Alcohol use: Yes     Alcohol/week: 0.0 standard drinks     Comment: rarely/ 4 times a year    Drug use: No    Sexual activity: Yes   Other Topics Concern    Not on file   Social History Narrative    Not on file     Social Determinants of Health     Financial Resource Strain:     Difficulty of Paying Living Expenses:    Food Insecurity:     Worried About Running Out of Food in the Last Year:     [address] in the Last Year:    Transportation Needs:     Lack of Transportation (Medical):  Lack of Transportation (Non-Medical):    Physical Activity:     Days of Exercise per Week:     Minutes of Exercise per Session:    Stress:     Feeling of Stress :    Social Connections:     Frequency of Communication with Friends and Family:     Frequency of Social Gatherings with Friends and Family:     Attends Manda[employer] Services:     Active Member of Clubs or Organizations:     Attends Club or Organization Meetings:     Marital Status:    Intimate Partner Violence:     Fear of Current or Ex-Partner:     Emotionally Abused:     Physically Abused:     Sexually Abused:        Family History   Problem Relation Age of Onset    Heart Attack Mother     Diabetes Mother     Diabetes Father     Heart Attack Father        Allergies:  Codeine    Home Medications:  Prior to Admission medications    Medication Sig Start Date End Date Taking?  Authorizing Provider   ASPIRIN ADULT LOW STRENGTH 81 MG EC tablet TAKE 1 TABLET BY MOUTH ONE TIME A DAY 7/26/21   Dodie Hammans, MD   omeprazole (PRILOSEC) 20 MG delayed release capsule Take 1 capsule by mouth 2 times daily 7/26/21   Dodie Hammans, MD   rOPINIRole (REQUIP) 2 MG tablet TAKE 2 TABLETS BY MOUTH NIGHTLY 7/26/21   Jesús Miner Yousif Bradford MD   vitamin D (ERGOCALCIFEROL) 1.25 MG (25178 UT) CAPS capsule Take 1 capsule by mouth once a week 6/16/21   MD Annalee   lisinopril (PRINIVIL;ZESTRIL) 40 MG tablet TAKE 1 TABLET BY MOUTH ONE TIME A DAY 6/9/21   Romeo Valladares MD   glipiZIDE (GLUCOTROL) 10 MG tablet TAKE 1 TABLET BY MOUTH 2 TIMES A DAY BEFORE MEALS (REPLACES GLYBURIDE) 6/9/21   Romeo Valladares MD   busPIRone (BUSPAR) 10 MG tablet TAKE 1 TABLET BY MOUTH 3 TIMES A DAY 6/9/21   Army Jasvir MD   TRULICITY 1.5 JT/2.7IG SOPN INJECT THE CONTENTS OF ONE SYRINGE UNDER THE SKIN ONCE WEEKLY 5/5/21   Shy Davis MD   albuterol sulfate HFA (PROVENTIL HFA) 108 (90 Base) MCG/ACT inhaler Inhale 2 puffs into the lungs every 4 hours as needed for Wheezing 4/20/21 4/20/22  Brannon Shelby MD   rosuvastatin (CRESTOR) 20 MG tablet TAKE 1 TABLET BY MOUTH NIGHTLY 3/22/21   Brannon Shelby MD   chlorthalidone (HYGROTON) 25 MG tablet TAKE 1 TABLET BY MOUTH ONE TIME A DAY 3/8/21   Brannon Shelby MD   SYNJARDY 5-1000 MG TABS TAKE 1 TABLET BY MOUTH 2 TIMES A DAY 1/12/21   Brannon Shelby MD   repaglinide (PRANDIN) 1 MG tablet TAKE 1 TABLET BY MOUTH 3 TIMES A DAY BEFORE MEALS 12/8/20   Brannon Shelby MD   blood glucose test strips (PRODIGY NO CODING BLOOD GLUC) strip Use to test once daily and as needed as directed by provider 11/12/20   Galen Hoang MD   diclofenac sodium (VOLTAREN) 1 % GEL Apply 2 g topically 2 times daily  Patient taking differently: Apply 2 g topically 2 times daily Prn pain 11/12/20   Galen Hoang MD   ondansetron (ZOFRAN) 4 MG tablet Take 1 tablet by mouth 3 times daily as needed for Nausea or Vomiting 3/20/20   Brannon Shelby MD   acetaminophen (ACETAMINOPHEN EXTRA STRENGTH) 500 MG tablet Take 1,000 mg by mouth daily as needed for Pain    Historical Provider, MD   bimatoprost (LUMIGAN) 0.01 % SOLN ophthalmic drops Place 1 drop into both eyes nightly 4/26/18   Historical Provider, MD   PRODIGY LANCETS 28G [address] [facility] 1 Bottle by Does not apply route three times daily 4/26/19   Buddy Arora MD   Blood Glucose Monitoring Suppl (PRODIGY AUTOCODE BLOOD GLUCOSE) w/Device KIT 1 Device by Does not apply route 3 times daily 4/26/19   Buddy Arora MD       REVIEW OF SYSTEMS    (2-9 systems for level 4, 10 or more for level 5)      Review of Systems   Constitutional: Negative for chills and fever. Respiratory: Negative for shortness of breath. Cardiovascular: Negative for chest pain. Gastrointestinal: Negative for abdominal pain and diarrhea. No incontinence of stool   Endocrine: Negative for polyuria. Genitourinary: Negative for enuresis. Musculoskeletal: Positive for back pain and gait problem (Difficult to me with shooting pain down left lower extremity). Negative for neck pain and neck stiffness. Neurological: Positive for weakness (Left lower extremity) and numbness (Down left lower extremity). PHYSICAL EXAM   (up to 7 for level 4, 8 or more for level 5)      INITIAL VITALS:   /70   Pulse 84   Temp 97.6 °F (36.4 °C) (Oral)   Resp 16   Ht 5' 3\" (1.6 m)   Wt 196 lb (88.9 kg)   SpO2 97%   BMI 34.72 kg/m²     Physical Exam  Constitutional:       General: She is in acute distress. Appearance: Normal appearance. HENT:      Head: Normocephalic and atraumatic. Mouth/Throat:      Mouth: Mucous membranes are moist.      Pharynx: Oropharynx is clear. Eyes:      Extraocular Movements: Extraocular movements intact. Conjunctiva/sclera: Conjunctivae normal.   Cardiovascular:      Rate and Rhythm: Normal rate and regular rhythm. Pulses: Normal pulses. Heart sounds: Normal heart sounds. No murmur heard. Pulmonary:      Effort: Pulmonary effort is normal.      Breath sounds: Normal breath sounds. Abdominal:      General: Bowel sounds are normal. There is no distension. Tenderness: There is no abdominal tenderness. There is no guarding.    Musculoskeletal: General: Tenderness present. Cervical back: No tenderness or bony tenderness. Thoracic back: No tenderness or bony tenderness. Lumbar back: No signs of trauma or spasms. Decreased range of motion. Positive left straight leg raise test (Modified due to patient's position of comfort was standing and not wanting to lay down). Comments: Patient having difficulty to passively move left lower extremity due to pain, pain with flexion of the hip, decreases with extension of the hip. Skin:     General: Skin is warm and dry. Findings: No rash (On exposed skin). Neurological:      General: No focal deficit present. Mental Status: She is alert and oriented to person, place, and time. Psychiatric:         Mood and Affect: Mood normal.         Behavior: Behavior normal.         DIFFERENTIAL  DIAGNOSIS     PLAN (LABS / IMAGING / EKG):  Orders Placed This Encounter   Procedures    CT LUMBAR SPINE WO CONTRAST       MEDICATIONS ORDERED:  Orders Placed This Encounter   Medications    lidocaine 4 % external patch 1 patch    ketorolac (TORADOL) injection 30 mg    predniSONE (DELTASONE) tablet 60 mg    diazePAM (VALIUM) tablet 5 mg    acetaminophen (TYLENOL) tablet 650 mg    oxyCODONE (ROXICODONE) immediate release tablet 5 mg    morphine sulfate (PF) injection 4 mg    DISCONTD: gabapentin (NEURONTIN) capsule 300 mg    gabapentin (NEURONTIN) capsule 300 mg    cyclobenzaprine (FLEXERIL) tablet 10 mg         DIAGNOSTIC RESULTS / EMERGENCY DEPARTMENT COURSE / MDM   LAB RESULTS:  No results found for this visit on 08/13/21. RADIOLOGY:  CT LUMBAR SPINE WO CONTRAST   Final Result   No acute fracture or traumatic malalignment. Multilevel degenerative disc and facet disease, greatest L5-S1 as described   above. EMERGENCY DEPARTMENT COURSE:  ED Course as of Aug 13 2351   Fri Aug 13, 2021   2027 Continue to have lower lumbar pain.   Will give Valium and Tylenol.    [CR]   9351 She continues to have sciatic-like pain that radiates down to the left lower extremity, patient has minimal improvement in pain with multiple medications. We will continue to evaluate patient. And to give IM morphine at this time.    [CR]   2337 Patient was able to stand up going to the bathroom, patient states that her pain went from 10-9.5 with the morphine IM. Patient feels that she would be able to ambulate up her stairs home, will continue to treat patient's pain and reevaluate.    [CR]      ED Course User Index  [CR] Ira Johnson DO          PROCEDURES:  None    CONSULTS:  None    MEDICAL DECISION MAKING:   Patient denies any change in urination or bowel habits, no incontinence of urine or stool. Patient describes no numbness in the perineal area, no fevers or chills or IV drug use that would make sign or concerns for epidural abscess. Patient's numbness and tingling down the leg, no perineal or changing bladder incontinence stools that would be indicative of cauda equina. Patient is able to ambulate though it is very difficult and painful. Patient's pain increases with leg flexion of the left and gets better with extension of the left, most likely piriformis or sciatic syndrome. Will evaluate CT scan for further evaluation of any mass occupying lesions. Plan to also give steroids, lidocaine patch, and Toradol for pain and inflammation control. CRITICAL CARE:  Please see attending note    FINAL IMPRESSION      1. Low back pain with sciatica, sciatica laterality unspecified, unspecified back pain laterality, unspecified chronicity          DISPOSITION / PLAN     DISPOSITION        PATIENT REFERRED TO:  No follow-up provider specified.     DISCHARGE MEDICATIONS:  New Prescriptions    No medications on file       Elizabeth Sena DO  Emergency Medicine Resident    (Please note that portions of thisnote were completed with a voice recognition program.  Efforts were made to edit the dictations but occasionally words are mis-transcribed.)       Yue Goldman DO  Resident  08/13/21 5605

## 2021-08-14 VITALS
DIASTOLIC BLOOD PRESSURE: 77 MMHG | HEIGHT: 63 IN | WEIGHT: 196 LBS | BODY MASS INDEX: 34.73 KG/M2 | SYSTOLIC BLOOD PRESSURE: 145 MMHG | OXYGEN SATURATION: 95 % | HEART RATE: 94 BPM | TEMPERATURE: 98.1 F | RESPIRATION RATE: 16 BRPM

## 2021-08-14 PROCEDURE — 6370000000 HC RX 637 (ALT 250 FOR IP): Performed by: EMERGENCY MEDICINE

## 2021-08-14 RX ORDER — CYCLOBENZAPRINE HCL 10 MG
10 TABLET ORAL 3 TIMES DAILY PRN
Qty: 10 TABLET | Refills: 0 | Status: SHIPPED | OUTPATIENT
Start: 2021-08-14 | End: 2021-08-17 | Stop reason: SDUPTHER

## 2021-08-14 RX ORDER — PREDNISONE 50 MG/1
50 TABLET ORAL DAILY
Qty: 4 TABLET | Refills: 0 | Status: SHIPPED | OUTPATIENT
Start: 2021-08-14 | End: 2021-10-07

## 2021-08-14 RX ORDER — OXYCODONE HYDROCHLORIDE 5 MG/1
5 TABLET ORAL EVERY 6 HOURS PRN
Qty: 20 TABLET | Refills: 0 | Status: SHIPPED | OUTPATIENT
Start: 2021-08-14 | End: 2021-08-19

## 2021-08-14 RX ORDER — GABAPENTIN 300 MG/1
300 CAPSULE ORAL 3 TIMES DAILY
Qty: 21 CAPSULE | Refills: 0 | Status: SHIPPED | OUTPATIENT
Start: 2021-08-14 | End: 2022-01-13

## 2021-08-14 RX ADMIN — GABAPENTIN 300 MG: 300 CAPSULE ORAL at 00:02

## 2021-08-14 RX ADMIN — CYCLOBENZAPRINE 10 MG: 10 TABLET, FILM COATED ORAL at 00:02

## 2021-08-14 NOTE — ED PROVIDER NOTES
[address] [facility]   Emergency Department  Emergency Medicine Attending Sign-out     Care of Morena Vigil was assumed from Dr. Bria Real  and is being seen for Back Pain  . The patient's initial evaluation and plan have been discussed with the prior provider who initially evaluated the patient. BRIEF PATIENT SUMMARY/MDM COURSE PER INITIAL PROVIDER:   RECENT VITALS:     Temp: 97.6 °F (36.4 °C),  Pulse: 84, Resp: 16, BP: 132/70, SpO2: 97 %    This patient is a 62 y.o. Female with back pain. Patient has had back pain for several months, but now is having radiation down the leg. Awaiting CT scan. And symptom relief.     DIAGNOSTICS/MEDICATIONS:     MEDICATIONS GIVEN:  ED Medication Orders (From admission, onward)    Start Ordered     Status Ordering Provider    08/14/21 0000 08/13/21 2349  gabapentin (NEURONTIN) capsule 300 mg  ONCE      Ordered QUIANA GILL    08/14/21 0000 08/13/21 2349  cyclobenzaprine (FLEXERIL) tablet 10 mg  ONCE      Ordered QUIANA GILL    08/13/21 2300 08/13/21 2249  morphine sulfate (PF) injection 4 mg  ONCE      Last MAR action: Given - by Chani Setting on 08/13/21 at [facility]    08/13/21 2130 08/13/21 2123  oxyCODONE (ROXICODONE) immediate release tablet 5 mg  ONCE      Last MAR action: Given - by Chani Setting on 08/13/21 at [facility]    08/13/21 2030 08/13/21 2015  diazePAM (VALIUM) tablet 5 mg  ONCE      Last MAR action: Given - by Chani Setting on 08/13/21 at [facility]35 [facility]    08/13/21 2030 08/13/21 2015  acetaminophen (TYLENOL) tablet 650 mg  ONCE      Last MAR action: Given - by Chani Setting on 08/13/21 at [facility]35 [facility]    08/13/21 1915 08/13/21 1905  lidocaine 4 % external patch 1 patch  DAILY      Last MAR action: Patch Applied - by Chani Setting on 08/13/21 at 1936 [facility]    08/13/21 1915 08/13/21 1905  ketorolac (TORADOL) injection 30 mg  ONCE      Last MAR Changes result in mild central canal stenosis. No foraminal stenosis. L5-S1: Moderate disc height loss. Moderate circumferential disc bulge. Moderate bilateral facet hypertrophy and ligamentum flavum hypertrophy. Changes result in mild central canal stenosis. Mild right foraminal stenosis. SOFT TISSUES/RETROPERITONEUM: Paravertebral muscles are unremarkable, with maintenance of intramuscular fat planes. Visualized abdominal viscera are unremarkable. No acute fracture or traumatic malalignment. Multilevel degenerative disc and facet disease, greatest L5-S1 as described above. OUTSTANDING TASKS / RECOMMENDATIONS:    1. Re-eval after pain meds and CT     ED COURSE AFTER ASSUMING CARE:     ED Course as of Aug 13 2350   Fri Aug 13, 2021   2027 Continue to have lower lumbar pain. Will give Valium and Tylenol.    [CR]   2323 She continues to have sciatic-like pain that radiates down to the left lower extremity, patient has minimal improvement in pain with multiple medications. We will continue to evaluate patient. And to give IM morphine at this time.    [CR]   2337 Patient was able to stand up going to the bathroom, patient states that her pain went from 10-9.5 with the morphine IM. Patient feels that she would be able to ambulate up her stairs home, will continue to treat patient's pain and reevaluate.    [CR]      ED Course User Index  [CR] Azam Witt, DO     I took over primary when resident signed out. At this time, patient still having pain, morphine did help slightly. Had a long discussion with her regarding the fact that do not feel that there is much more that we build to do for even in the hospital as she is now having clinical signs or symptoms of cauda equina. Finished having neuropathic pain, will try gabapentin, and will also try Flexeril. We discussed that the Flexeril works can send home on that versus the Valium.   And plan to send home with a short course of pain medications. And follow-up outpatient. FINAL IMPRESSION:     1.  Low back pain with sciatica, sciatica laterality unspecified, unspecified back pain laterality, unspecified chronicity        DISPOSITION:         DISPOSITION:  [x]  Discharge   []  Transfer -    []  Admission -     []  Against Medical Advice   []  James Talyor MD  Emergency Medicine Attending  [facility]       Lyudmila Crooks MD  08/18/21 4480

## 2021-08-17 ENCOUNTER — OFFICE VISIT (OUTPATIENT)
Dept: FAMILY MEDICINE CLINIC | Age: 61
End: 2021-08-17
Payer: COMMERCIAL

## 2021-08-17 ENCOUNTER — TELEPHONE (OUTPATIENT)
Dept: FAMILY MEDICINE CLINIC | Age: 61
End: 2021-08-17

## 2021-08-17 VITALS
SYSTOLIC BLOOD PRESSURE: 118 MMHG | WEIGHT: 195 LBS | TEMPERATURE: 97 F | HEART RATE: 94 BPM | DIASTOLIC BLOOD PRESSURE: 73 MMHG | HEIGHT: 63 IN | BODY MASS INDEX: 34.55 KG/M2

## 2021-08-17 DIAGNOSIS — M54.17 LUMBOSACRAL RADICULOPATHY: Primary | ICD-10-CM

## 2021-08-17 PROCEDURE — 99211 OFF/OP EST MAY X REQ PHY/QHP: CPT | Performed by: FAMILY MEDICINE

## 2021-08-17 PROCEDURE — 99213 OFFICE O/P EST LOW 20 MIN: CPT | Performed by: FAMILY MEDICINE

## 2021-08-17 RX ORDER — CYCLOBENZAPRINE HCL 10 MG
10 TABLET ORAL 3 TIMES DAILY PRN
Qty: 10 TABLET | Refills: 0 | Status: SHIPPED | OUTPATIENT
Start: 2021-08-17 | End: 2022-01-13

## 2021-08-17 RX ORDER — KETOROLAC TROMETHAMINE 10 MG/1
10 TABLET, FILM COATED ORAL EVERY 6 HOURS PRN
Qty: 20 TABLET | Refills: 0 | Status: SHIPPED | OUTPATIENT
Start: 2021-08-17 | End: 2022-01-13

## 2021-08-17 NOTE — PROGRESS NOTES
Visit Information    Have you changed or started any medications since your last visit including any over-the-counter medicines, vitamins, or herbal medicines? no   Have you stopped taking any of your medications? Is so, why? -  no  Are you having any side effects from any of your medications? - no    Have you seen any other physician or provider since your last visit?  no   Have you had any other diagnostic tests since your last visit?  no   Have you been seen in the emergency room and/or had an admission in a hospital since we last saw you?  [facility]    Have you had your routine dental cleaning in the past 6 months? Yes, feb or march     Do you have an active MyChart account? If no, what is the barrier?   Yes    Patient Care Team:  Joelle Pro MD as PCP - General (Family Medicine)  Joelle Pro MD as PCP - [facility]  Saray Slater MD as Consulting Physician (Gastroenterology)    Medical History Review  Past Medical, Family, and Social History reviewed and does not contribute to the patient presenting condition    Health Maintenance   Topic Date Due    Shingles Vaccine (1 of 2) Never done    Diabetic retinal exam  01/12/2017    DTaP/Tdap/Td vaccine (2 - Tdap) 12/15/2020    Diabetic foot exam  11/12/2021 (Originally 12/1/2017)    Flu vaccine (1) 09/01/2021    Diabetic microalbuminuria test  06/11/2022    A1C test (Diabetic or Prediabetic)  06/24/2022    Lipid screen  06/24/2022    Potassium monitoring  07/21/2022    Creatinine monitoring  07/21/2022    Colon cancer screen colonoscopy  09/20/2022    Breast cancer screen  11/14/2022    Pneumococcal 0-64 years Vaccine (2 of 2 - PPSV23) 03/10/2026    COVID-19 Vaccine  Completed    Hepatitis C screen  Completed    HIV screen  Completed    Hepatitis A vaccine  Aged Out    Hib vaccine  Aged Out    Meningococcal (ACWY) vaccine  Aged Out

## 2021-08-17 NOTE — PATIENT INSTRUCTIONS
Thank you for letting us take care of you today. We hope all your questions were addressed. If a question was overlooked or something else comes to mind after you return home, please contact a member of your Care Team listed below. Your Care Team at [place] [address] is Team #1  Peg Adamson MD (Faculty)  Abi Nunez (Resident)  Etta Serrano MD (Resident)  SHERRY Crowell LPN [facility] office)  Cori Osman [place] (Clinical Practice Manager)  [facility] (Clinical Pharmacist)     Office phone number: [phone]    If you need to get in right away due to illness, please be advised we have \"Same Day\" appointments available Monday-Friday. Please call us at [phone] option #3 to schedule your \"Same Day\" appointment. Patient Education        Sciatica: Care Instructions  Your Care Instructions     Sciatica (say \"vtv-VG-hq-kuh\") is an irritation of one of the sciatic nerves, which come from the spinal cord in the lower back. The sciatic nerves and their branches extend down through the buttock to the foot. Sciatica can develop when an injured disc in the back irritates or presses against a spinal nerve root. Its main symptom is pain, numbness, or weakness that is often worse in the leg or foot than in the back. Sciatica often will improve and go away with time. Early treatment usually includes medicines and exercises to relieve pain. Follow-up care is a key part of your treatment and safety. Be sure to make and go to all appointments, and call your doctor if you are having problems. It's also a good idea to know your test results and keep a list of the medicines you take. How can you care for yourself at home? · Take pain medicines exactly as directed. ? If the doctor gave you a prescription medicine for pain, take it as prescribed.   ? If you are not taking a prescription pain medicine, ask your doctor if you can take an over-the-counter medicine. · Use heat or ice to relieve pain. ? To apply heat, put a warm water bottle, heating pad set on low, or warm cloth on your back. Do not go to sleep with a heating pad on your skin. ? To use ice, put ice or a cold pack on the area for 10 to 20 minutes at a time. Put a thin cloth between the ice and your skin. · Avoid sitting if possible, unless it feels better than standing. · Alternate lying down with short walks. Increase your walking distance as you are able to without making your symptoms worse. · Do not do anything that makes your symptoms worse. When should you call for help? Call 911 anytime you think you may need emergency care. For example, call if:    · You are unable to move a leg at all. Call your doctor now or seek immediate medical care if:    · You have new or worse symptoms in your legs or buttocks. Symptoms may include:  ? Numbness or tingling. ? Weakness. ? Pain.     · You lose bladder or bowel control. Watch closely for changes in your health, and be sure to contact your doctor if:    · You are not getting better as expected. Where can you learn more? Go to https://WhiskpeWesabe.Youxiduo. org and sign in to your [employer] account. Enter [phone] in the [facility] box to learn more about \"Sciatica: Care Instructions. \"     If you do not have an account, please click on the \"Sign Up Now\" link. Current as of: November 16, 2020               Content Version: 12.9  © 2006-2021 [employer]. Care instructions adapted under license by [facility] ([facility]). If you have questions about a medical condition or this instruction, always ask your healthcare professional. [place] [address] any warranty or liability for your use of this information. Patient Education        Sciatica: Exercises  Introduction  Here are some examples of typical rehabilitation exercises for your condition. Start each exercise slowly.  Ease off the exercise if you start to have pain. Your doctor or physical therapist will tell you when you can start these exercises and which ones will work best for you. When you are not being active, find a comfortable position for rest. Some people are comfortable on the floor or a medium-firm bed with a small pillow under their head and another under their knees. Some people prefer to lie on their side with a pillow between their knees. Don't stay in one position for too long. Take short walks (10 to 20 minutes) every 2 to 3 hours. Avoid slopes, hills, and stairs until you feel better. Walk only distances you can manage without pain, especially leg pain. How to do the exercises  Back stretches   1. Get down on your hands and knees on the floor. 2. Relax your head and allow it to droop. Round your back up toward the ceiling until you feel a nice stretch in your upper, middle, and lower back. Hold this stretch for as long as it feels comfortable, or about 15 to 30 seconds. 3. Return to the starting position with a flat back while you are on your hands and knees. 4. Let your back sway by pressing your stomach toward the floor. Lift your buttocks toward the ceiling. 5. Hold this position for 15 to 30 seconds. 6. Repeat 2 to 4 times. Follow-up care is a key part of your treatment and safety. Be sure to make and go to all appointments, and call your doctor if you are having problems. It's also a good idea to know your test results and keep a list of the medicines you take. Where can you learn more? Go to https://[employer] AnalyticsvictorinoTRUECar.Voxound. org and sign in to your [employer] account. Enter L979 in the [employer] box to learn more about \"Sciatica: Exercises. \"     If you do not have an account, please click on the \"Sign Up Now\" link. Current as of: November 16, 2020               Content Version: 12.9  © 2025-2463 [employer]. Care instructions adapted under license by [facility] ([facility]).  If you have questions about a medical condition or this instruction, always ask your healthcare professional. [place] [address] any warranty or liability for your use of this information.

## 2021-08-17 NOTE — PROGRESS NOTES
Subjective:      Courtney Mcallister is a 62 y.o. female The patient first noted symptoms 3 months ago. It was not related to no known injury, remote injury, twisting and lifting heavy object. The pain is rated 8 /10, severe, and is located at the left lumbar area, left sacroiliac area or left gluteal area. The pain is described as aching, sharp, stabbing, burning or radiating to left leg and foot and occurs all day. The symptoms are not progressive. Symptoms are exacerbated by nothing in particular and sitting. Factors which relieve the pain include change in body position, narcotics and Gabapentin. Other associated symptoms include mild weakness in the left leg and numbness in the toes- improved . Previous history of symptoms: never. Treatment efforts have included oral steroids and Narcotics and PO steroids given at [facility] ER, with relief. Outside reports reviewed: ER records and imaging reports: CT spine- DJD and disc dx at L5-S1. Patient's medications, allergies, past medical, surgical, social and family histories were reviewed and updated as appropriate. Review of Systems  Constitutional: negative  Respiratory: negative  Cardiovascular: negative  Gastrointestinal: negative  Musculoskeletal:positive for back pain      Objective:   Physical Exam  Constitutional:       Appearance: Normal appearance. Cardiovascular:      Rate and Rhythm: Normal rate and regular rhythm. Heart sounds: Normal heart sounds. No murmur heard. Pulmonary:      Effort: Pulmonary effort is normal.      Breath sounds: Normal breath sounds. Abdominal:      Palpations: Abdomen is soft. Tenderness: There is no abdominal tenderness. Musculoskeletal:         General: No swelling or tenderness. Neurological:      Mental Status: She is alert. General :    alert, appears stated age and cooperative   Gait:  Normal. The patient can bear weight on the injured extremity.    Tenderness:   left sacroiliac region   Edema:   absent.    Back ROM:  flexion 100, extension +30, lateral rotation 90/90, lateral bending 50/50   Straight Leg Raise:   L leg40/Normal-R leg   Imaging  As per ER     Assessment:      Low back pain, likely secondary to Sciatica and DJD       Plan:      I discussed the following treatment options: Physical Therapy discussed and ordered  Exercise options discussed and encouraged  Medication options discussed and recommended  Questions solicited and answered  Patient requested to return prn

## 2021-08-17 NOTE — PROGRESS NOTES
Writer didn't check out patient, did give orders, didn't give AVS,  Coral Barker checked out patient.

## 2021-08-17 NOTE — TELEPHONE ENCOUNTER
Pharmacy calling stating that Oral Toradol can not be given to patient until it is a continuation of an IV or IM after a procedure

## 2021-08-30 NOTE — TELEPHONE ENCOUNTER
Last visit: 08/17/21  Last Med refill: 06/09/21  Does patient have enough medication for 72 hours: Yes    Next Visit Date:  Future Appointments   Date Time Provider Nicola Zaidi   8/31/2021  8:00 AM Tenisha Callejas PT [facility]   9/14/2021  8:30 AM Cassie Zambrano MD [address] [place] [address] Maintenance   Topic Date Due    Shingles Vaccine (1 of 2) Never done    Diabetic retinal exam  01/12/2017    DTaP/Tdap/Td vaccine (2 - Tdap) 12/15/2020    Diabetic foot exam  11/12/2021 (Originally 12/1/2017)    Flu vaccine (1) 09/01/2021    Diabetic microalbuminuria test  06/11/2022    A1C test (Diabetic or Prediabetic)  06/24/2022    Lipid screen  06/24/2022    Potassium monitoring  07/21/2022    Creatinine monitoring  07/21/2022    Colon cancer screen colonoscopy  09/20/2022    Breast cancer screen  11/14/2022    Pneumococcal 0-64 years Vaccine (2 of 2 - PPSV23) 03/10/2026    COVID-19 Vaccine  Completed    Hepatitis C screen  Completed    HIV screen  Completed    Hepatitis A vaccine  Aged Out    Hib vaccine  Aged Out    Meningococcal (ACWY) vaccine  Aged Out       Hemoglobin A1C (%)   Date Value   06/24/2021 7.9 (H)   04/20/2021 7.3   12/08/2020 7.9             ( goal A1C is < 7)   Microalb/Crt.  Ratio (mcg/mg creat)   Date Value   06/11/2021 CANNOT BE CALCULATED     LDL Cholesterol (mg/dL)   Date Value   06/24/2021 52   04/20/2021 48       (goal LDL is <100)   AST (U/L)   Date Value   02/04/2021 18     ALT (U/L)   Date Value   02/04/2021 20     BUN (mg/dL)   Date Value   07/21/2021 30 (H)     BP Readings from Last 3 Encounters:   08/17/21 118/73   08/14/21 (!) 145/77   06/16/21 114/68          (goal 120/80)    All Future Testing planned in [facility]  Lab Frequency Next Occurrence   EKG 12 Lead Once 02/09/2021   COVID-19 Once 02/12/2021               Patient Active Problem List:     Cervical spondylosis     Type II or unspecified type diabetes mellitus without mention of complication, not stated as uncontrolled     Hypertension     Abnormal auditory perception     Eustachian tube dysfunction     Chronic serous otitis media     Nasal polyps     Nasal congestion     Osteoarthritis of left knee     Osteoarthritis of right knee     DIEGO (nonalcoholic steatohepatitis)     Vitamin D deficiency     Iron deficiency anemia     Dyslipidemia     Diabetes mellitus type 2, uncontrolled (HCC)     Left hip pain     Trochanteric bursitis     Fatigue     Daytime somnolence     Snoring     Chronic left shoulder pain     Restless legs syndrome     Generalized anxiety disorder     Primary osteoarthritis, left shoulder     Tendinopathy of left shoulder     Adhesive capsulitis of left shoulder

## 2021-08-31 ENCOUNTER — HOSPITAL ENCOUNTER (OUTPATIENT)
Dept: PHYSICAL THERAPY | Age: 61
Setting detail: THERAPIES SERIES
Discharge: HOME | End: 2021-08-31
Payer: COMMERCIAL

## 2021-08-31 PROCEDURE — 97162 PT EVAL MOD COMPLEX 30 MIN: CPT

## 2021-08-31 PROCEDURE — 97110 THERAPEUTIC EXERCISES: CPT

## 2021-08-31 RX ORDER — BUSPIRONE HYDROCHLORIDE 10 MG/1
TABLET ORAL
Qty: 180 TABLET | Refills: 3 | Status: SHIPPED | OUTPATIENT
Start: 2021-08-31 | End: 2022-01-13 | Stop reason: SDUPTHER

## 2021-08-31 NOTE — CONSULTS
[address] [facility]   Outpatient Physical Therapy  Physical Therapy Lumbar Evaluation    Date:  2021  Patient: Fadia Mccollum  : 1960  MRN: [id number]  Physician: Omid Vasquez MD  Insurance: Medical [place]. Visits BMN. Medical Diagnosis: M54.17 (ICD-10-CM) - Lumbosacral radiculopathy   Rehab Codes: M54.17, R53.1  Onset Date: 2021    Next 's appt: 21    Subjective:   CC: LBP radiating into L posterior hip and entire L LE. Frequent numbness in L lateral lower leg, ankle and foot. HPI: Pt reports insidious onset of vague L posterior hip pain around  of this year with no known mechanism of injury or other contributing factors. However, states that pain began to progressively worsen and start radiating into her L thigh and lower leg, as well as intermittently in her R LE as well. States that she was working at her desk on 21 and pain got so severe that she was in tears with uncontrolled burning/throbbing pain in her L posterior hip radiating into her L LE with pt eventually deciding to go for an ED consult. Sx were managed somewhat in ED with extensive medication with lumbar CT + for multilevel DDD most notable at L5-S1. Followed up with her PCP after ED visit who recommended pt to PT for conservative mgmt.     PMHx: [] Unremarkable [x] Diabetes [x] HTN  [] Pacemaker   [] MI/Heart Problems [] Cancer [x] Arthritis [x] Other: HLD, RLS,               [x] Refer to full medical chart  In EPIC     Comorbidities:   [] Obesity [] Dialysis  [] Other:   [] Asthma/COPD [] Dementia [] Other:   [] Stroke [] Sleep apnea [] Other:   [] Vascular disease [] Rheumatic disease [] Other:     Preferred Language:   [x] English           [] Other:    Prior Imaging: Lumbar CT from 21 revealed multilevel DDD most notable at L5-S1 with moderate disc height loss present    Previous Treatment: Attended 20 visits at this facility through 2021 for her L shoulder for her adhesive capsulitis and s/p CRISTOBAL. 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  Denies ever experiencing radiating pain into her L LE prior to onset of this     Pain:  [x] Yes  [] No Location: L posterior hip Pain Rating: (0-10 scale) 8/10  Pain altered Tx:  [] Yes  [x] No  Action:    Symptoms:  [] Improving [] Worsening [x] Same  Aggravating factors: Extended sitting, forward bending, L LE numbness with any period of walking  Alleviating factors: Heat, shifting away from L side in sitting      Functional Status Previous level of function Current level of function Comments   Sitting [x] Independent  [] Deficit [] Independent  [] Deficit    Standing/walking [x] Independent  [] Deficit [] Independent  [x] Deficit 5 minutes or less   Driving [x] Independent  [] Deficit [] Independent  [x] Deficit Back pain   Housekeeping/Meal Preparation [x] Independent  [] Deficit [] Independent  [x] Deficit Severe pain and difficulty   Lifting/Carrying [x] Independent  [] Deficit [] Independent  [x] Deficit Unable   Bending/Reaching [x] Independent  [] Deficit [] Independent  [x] Deficit Severe pain and difficulty with forward bending   Stair climbing [x] Independent  [] Deficit [] Independent  [x] Deficit Difficulty/discomfort   Pivoting [x] Independent  [] Deficit [] Independent  [] Deficit    Squatting [] Independent  [] Deficit [] Independent  [] Deficit    Other [] Independent  [] Deficit [] Independent  [] Deficit      Patient Goals/Rehab Expectations: Reduce pain and restore movement to normal      Objective:      OBSERVATION No Deficit Deficit Not Tested Comments   Forward Head [] [x] []    Rounded Shoulders [] [x] []    Kyphosis [] [] []    Lordosis [] [x] [] Decreased   Scoliosis [x] [] []    Innominate Alignment [x] [] []    NEUROLOGICAL       Reflexes [] [] [x]    Compression/Distraction [] [x] [] Pain with segmental PA mobility at L4-L5   Sensation [] [x] [] Reduced light touch sensation over L lateral lower leg, ankle and foot   FALL RISK?  [x] []     Comments:        Range of Motion  Left Range of Motion  Right Strength  Left Strength  Right   Lumbar Flexion 50%  L LE pain 50%     Lumbar Extension 50%  Back pain 50%  Back pain     Lumbar Rotation 50% 25%  L posterior hip/back pain     Lumbar Side Bend 25%  L posterior hip/back pain 75%     Hip Flexion   3+/5 4-/5   Hip Abduction   4-/5 4-/5   Hip Adduction   4/5 4/5   Hip Extension   3/5 4-/5   Hip ER       Hip IR       Knee Flexion   3+/5 4/5   Knee Extension   3-/5  Pain with active knee ext in sitting 4/5   Dorsiflexion   3+/5 4/5   Plantar flexion       Inversion       Eversion       *All LE MMT assessed grossly in modified (seated) positioning    Core Strength: Sahrmann Grade 1/5    LUMBAR/SIJ SPECIAL TESTS Left Right   Standing Flexion + [x]         - []           NT []  + []         - [x]           NT []    Repeated Flexion + []         - []           NT [x]  + []         - []           NT [x]    Repeated Extension + []         - []           NT []   Tolerates prone prop well but not prone press ups + []         - [x]           NT []    SLR + [x]         - []           NT []  + []         - [x]           NT []    Slump Test + [x]         - []           NT []  + []         - [x]           NT []    Prone Instability Test + []         - []           NT [x]  + []         - []           NT [x]    Anterior Gapping + []         - [x] NT []  + []         - [x]           NT []    Posterior Gapping + []         - [x]           NT []  + []         - [x]           NT []    Sacral Thrust + []         - [x]           NT []  + []         - [x]           NT []    Thigh Thrust + []         - []           NT [x]  + []         - []           NT [x]    Gaenslen's + []         - []           NT [x]  + []         - []           NT [x]    Other:  + []         - []           NT []  + []         - []           NT []      HIP SPECIAL TESTS Left Right   MILES + [x]         - []           NT []   Tightness only + [x]         - []           NT []   Tightness only   FADIR + []         - []           NT []  + []         - []           NT []    Scour + []         - []           NT [x]  + []         - []           NT [x]    Trendelenburg Sign + []         - []           NT [x]  + []         - []           NT [x]    Patrick + []         - []           NT [x]  + []         - []           NT []    Long axis traction + []         - [x]           NT []  + []         - [x]           NT []    Other: + []         - []           NT []  + []         - []           NT []        NEURAL/VASCULAR TESTS Left Right   Sciatic Nerve Tensioning + [x]         - []           NT []  + []         - [x]           NT []    Tibial Nerve Tensioning + [x]         - []           NT []  + []         - [x]           NT []    Sural Nerve Tensioning + []         - [x]           NT []  + []         - [x]           NT []    Common Peroneal Nerve Tensioning + []         - [x]           NT []  + []         - [x]           NT []    Femoral Nerve Tensioning + []         - []           NT []  + []         - []           NT []    Other: + []         - []           NT []  + []         - []           NT []      MUSCLE LENGTH TESTING Normal Left Tight Right Tight   Glutes []  [x]  [x]    Piriformis []  [x]  [x]    ITB/TFL []  []  []    Hip Flexors []  []  []    Quads []  []  []    Hamstrings []  []  [] Gastrocnemius []  []  []    Soleus []  []  []     []  []  []     []  []  []        Joint Mobility: Diffusely hypomobile at all lumbar segments with significant pain at L4-L5. Palpation: Significant tightness and restriction in B lumbar paraspinals, glutes, and piriformis (L>R). Gait: Independent [x]           Modified Independent []            Not independent []  Comments: Ambulates independently without an AD with a slow, guarded javed. Near absent trunk rotation. Visible discomfort upon rising from a seated position and amb initial few steps. Assessment:  Problems:    [x] ? Pain:  [x] ? ROM:  [x] ? Strength:  [x] ? Function:  [] Other:    Pt is a 61 y/o F referred to PT with 2 month hx of L sided lumbar radiculopathy causing severe pain and dysfunction in pt's L LE. Upon evaluation today, pt presents with high pain levels and very limited lumbar AROM with radicular sx most triggered with forward flexion. Also moderately irritable with end range lumbar extension but able to tolerate prone on elbows without increase in sx. + SLR test with bias for sciatic nerve and tibial nerve irritability on L. Notable weakness/inhibition with all strength testing of B LEs and core, and also presents with reduced sensation at L lateral lower leg/ankle/foot. Skilled PT intervention is required to help centralize sx, improve pain free lumbar AROM, and maximize neuromuscular strength & stability necessary for unrestricted ADL/work tolerances at her prior level of function. STG: (to be met in 6 treatments)  1. Pt will self report worst pain no greater than 3/10 in order to better tolerate ADLs/work activities with minimal dysfunction  2. Pt will improve lumbar AROM to 90% or greater in all planes in order to demonstrate ability to move/reach in all planes unrestricted at PLOF  LTG: (to be met in 12 treatments)  1.  Pt will demonstrate improved B LE strength to 4/5 or greater in order to demonstrate improved stability/strength necessary for unrestricted ADLs/work activities   2. Pt will self report ability to complete all work duties with pain no greater than 1/10 to demonstrate unrestricted functional tolerances at prior level  3. Pt will improve core strength Sahrmann Grade 3/5 or greater to demonstrate better support and stability to lumbar spine  4. Pt will decrease SHEY to 10% impaired or less in order to demonstrate improved functional tolerances at PLOF with minimal restriction/dysfunction  5. Pt will demonstrate independence with a long term HEP for continued progress/maintenance after completion of PT      Functional Assessment Used: Mod. SHEY  Current Status Score: 46% impaired  Goal Status Score: 10% impaired    Evaluation Complexity:  History (Personal factors, comorbidities) [] 0 [x] 1-2 [] 3+   Exam (limitations, restrictions) [] 1-2 [x] 3 [] 4+   Clinical presentation (progression) [] Stable [x] Evolving  [] Unstable   Decision Making [] Low [x] Moderate [] High    [] Low Complexity [x] Moderate Complexity [] High Complexity     Rehab Potential:  [x] Good  [] Fair  [] Poor   Suggested Professional Referral:  [x] No  [] Yes:  Barriers to Goal Achievement[de-identified]  [x] No  [] Yes:  Domestic Concerns:  [x] No  [] Yes:    Pt. Education:  [x] Plans/Goals, Risks/Benefits discussed  [x] Home exercise program  Method of Education: [x] Verbal  [x] Demo  [x] Written ([address] [place] [address] Access Code [email or web address])  Comprehension of Education:  [x] Verbalizes understanding. [x] Demonstrates understanding. [] Needs Review. [] Demonstrates/verbalizes understanding of HEP/Ed previously given.     Treatment Plan:  [x] Therapeutic Exercise   48559  [] Iontophoresis: 4 mg/mL Dexamethasone Sodium Phosphate  mAmin  04172   [x] Therapeutic Activity  76261 [] Vasopneumatic cold with compression  84772    [] Gait Training   41679 [] Ultrasound   35215   [x] Neuromuscular Re-education  94207 [] Electrical Stimulation Unattended  23489   [x] Manual Therapy  65300 [] Electrical Stimulation Attended  [id number]   [x] Instruction in HEP  [] Lumbar/Cervical Traction  [id number]   [] Aquatic Therapy   [id number] [x] Cold/hotpack    [] Massage   [id number]      [] Dry Needling, 1 or 2 muscles  72363   [] Biofeedback, first 15 minutes   97144  [] Biofeedback, additional 15 minutes   25617 [] Dry Needling, 3 or more muscles  65407          Frequency: 2x/week for 12 visits    Todays Treatment:  Modalities:   Precautions: None  Exercises:  Exercise Reps/ Time Weight/ Level Comments   Prone prop 2'  Initial HEP   SKTC stretch  30'' x 3  Left; Initial HEP   LTRs 15 reps  Initial HEP   Seated figure 4 30'' x 3  Left in pain free range; initial HEP         Other:    Specific Instructions for next treatment: Manual therapy as needed to address soft tissue restrictions in hips and low back (L>R) and progressive emphasis on sx centralization, pain free mobility, and core/hip stabilization as tolerated    Treatment Charges: Mins Units   [x] Evaluation       [x]  Low       [x]  Moderate       []  High 47 1   []  Modalities     [x]  Ther Exercise 8 1   []  Manual Therapy     []  Ther Activities     []  Aquatics     []  Neuromuscular     []  Gait Training     []  Dry Needling           1-2 muscles     []  Dry Needling           3 or more muscles     [] Vasocompression     []  Other       TOTAL TREATMENT TIME: 54    Time in: 8:05am    Time Out: 9:00am    Electronically signed by: Sharonda Payton PT        Physician Signature:________________________________Date:__________________  By signing above or cosigning this note, I have reviewed this plan of care and certify a need for medically necessary rehabilitation services.      *PLEASE SIGN ABOVE AND FAX BACK ALL PAGES*

## 2021-09-09 ENCOUNTER — HOSPITAL ENCOUNTER (OUTPATIENT)
Dept: PHYSICAL THERAPY | Age: 61
Setting detail: THERAPIES SERIES
Discharge: HOME | End: 2021-09-09
Payer: COMMERCIAL

## 2021-09-09 PROCEDURE — 97140 MANUAL THERAPY 1/> REGIONS: CPT

## 2021-09-09 PROCEDURE — 97110 THERAPEUTIC EXERCISES: CPT

## 2021-09-09 NOTE — FLOWSHEET NOTE
[facility]   [address]   Phone: [phone]   Fax: [phone]    Physical Therapy Daily Treatment Note      Date:  2021  Patient Name:  Radha Kohler    :  1960  MRN: [id number]  Physician: Cody Adair MD     Insurance: Medical [place]. Visits BMN. Diagnosis: M54.17 (ICD-10-CM) - Lumbosacral radiculopathy    Onset Date: 2021   Next Dr. Ivania Silva: 21  Visit# / total visits: 2/  Cancels/No Shows: 0/0    Subjective:    Pain:  [x] Yes  [] No Location: L posterior hip Pain Rating: (0-10 scale) 8/10  Pain altered Tx:  [] No  [] Yes  Action:  Comments: : Pt states that she has still been struggling with a lot of pain recently in her L leg which has been frustrating her, but does report some relief with figure 4 stretching and LTRs. However, also states that since Thursday or Friday of last week, she has been struggling with dizziness when she gets in and out of bed. States that she briefly feels like the room is spinning which seems to resolve after about a minute. Objective:  Modalities:   Precautions: None  Exercises:  Exercise Reps/ Time Weight/ Level Completed  Today Comments   Manual therapy 10'  x MFR to L glute max/med and piriformis          Prone prop       Bridges 10 reps x 3  x    LTRs       Supine marching (2 up- 2 down) with TrA activation 10 reps x 2  x    Seated figure 4 30'' x 3  x    Sidelying clams 15 reps x 2  x Left only   Other:    Specific Instructions for next treatment: Manual therapy as needed to address soft tissue restrictions in hips and low back (L>R) and progressive emphasis on sx centralization, pain free mobility, and core/hip stabilization as tolerated      Assessment: [x] Progressing toward goals. [] No change.      [] Other:    [x] Patient would continue to benefit from skilled physical therapy services in order to: help centralize sx, improve pain free lumbar AROM, and maximize neuromuscular strength & stability necessary for unrestricted ADL/work tolerances at her prior level of function    9/9: Pt continues to struggle with consistent radiating pain into her L posterior hip and LE. Good carryover understanding of initial written HEP established at last visit but pt has not been able to perform as consistently secondary to new onset vertigo. Vertigo sx last only briefly and seem to be provoked by bed mobility and changes in head position, consistent with possible BPPV. Discussed benefits of vestibular rehab with pt and advised pt to consider requesting a physician referral to add on new dx. Progressed stabilization activities today with good tolerance, just fatigue in glutes and core through all. No increased radicular sx with any interventions but no significant improvement in pain post session today. STG: (to be met in 6 treatments)  1. Pt will self report worst pain no greater than 3/10 in order to better tolerate ADLs/work activities with minimal dysfunction  2. Pt will improve lumbar AROM to 90% or greater in all planes in order to demonstrate ability to move/reach in all planes unrestricted at PLOF  LTG: (to be met in 12 treatments)  1. Pt will demonstrate improved B LE strength to 4/5 or greater in order to demonstrate improved stability/strength necessary for unrestricted ADLs/work activities   2. Pt will self report ability to complete all work duties with pain no greater than 1/10 to demonstrate unrestricted functional tolerances at prior level  3. Pt will improve core strength Sahrmann Grade 3/5 or greater to demonstrate better support and stability to lumbar spine  4. Pt will decrease SHEY to 10% impaired or less in order to demonstrate improved functional tolerances at PLOF with minimal restriction/dysfunction  5. Pt will demonstrate independence with a long term HEP for continued progress/maintenance after completion of PT    Pt.  Education:  [x] Yes  [] No  [] Reviewed Prior HEP/Ed  Method of Education: [x] Verbal  [x] Demo  [] Written  Comprehension of Education:  [] Verbalizes understanding. [] Demonstrates understanding. [] Needs review. [] Demonstrates/verbalizes HEP/Ed previously given. Plan: [x] Continue per plan of care.    [] Other:      Treatment Charges: Mins Units   []  Modalities     [x]  Ther Exercise 30 2   [x]  Manual Therapy 10 1   []  Ther Activities     []  Aquatics     []  Neuromuscular     [] Vasocompression     [] Gait Training     [] Dry needling        [] 1 or 2 muscles        [] 3 or more muscles     []  Other     Total Treatment time 40 3     Time In: 9:30am            Time Out: 10:10am    Electronically signed by:  Luda Fleming PT

## 2021-09-12 DIAGNOSIS — E78.5 DYSLIPIDEMIA: ICD-10-CM

## 2021-09-13 ENCOUNTER — HOSPITAL ENCOUNTER (OUTPATIENT)
Dept: PHYSICAL THERAPY | Age: 61
Setting detail: THERAPIES SERIES
Discharge: HOME | End: 2021-09-13
Payer: COMMERCIAL

## 2021-09-13 ENCOUNTER — TELEPHONE (OUTPATIENT)
Dept: FAMILY MEDICINE CLINIC | Age: 61
End: 2021-09-13

## 2021-09-13 PROCEDURE — 97140 MANUAL THERAPY 1/> REGIONS: CPT

## 2021-09-13 PROCEDURE — 97110 THERAPEUTIC EXERCISES: CPT

## 2021-09-13 RX ORDER — ROSUVASTATIN CALCIUM 20 MG/1
TABLET, COATED ORAL
Qty: 30 TABLET | Refills: 2 | Status: SHIPPED | OUTPATIENT
Start: 2021-09-13 | End: 2022-01-11

## 2021-09-13 NOTE — FLOWSHEET NOTE
[facility]   [address]   Phone: [phone]   Fax: [phone]    Physical Therapy Daily Treatment Note      Date:  2021  Patient Name:  Farhad Bishop    :  1960  MRN: [id number]  Physician: Brannon Shelby MD     Insurance: Medical [place]. Visits BMN. Diagnosis: M54.17 (ICD-10-CM) - Lumbosacral radiculopathy    Onset Date: 2021   Next Dr. Devries Dy: 21  Visit# / total visits: 3  Cancels/No Shows: 0/0    Subjective:    Pain:  [x] Yes  [] No Location: L posterior hip Pain Rating: (0-10 scale) 6/10  Pain altered Tx:  [] No  [] Yes  Action:  Comments: : Patient reports today that back pain is slightly improved. Reported minimal radiating pain today. Objective:  Modalities:   Precautions: None  Exercises:  Exercise Reps/ Time Weight/ Level Completed  Today Comments   Manual therapy 10'  x MFR to L glute max/med and piriformis          Prone prop       Bridges 10 reps x 3  x    LTRs 10 reps  x    Supine marching (2 up- 2 down) with TrA activation 10 reps x 2  x    Seated figure 4 30'' x 3  x    Sidelying clams 15 reps x 2  x BLE   TrA Activation with heel hover 10 reps x 2  x    Other:    Specific Instructions for next treatment: Manual therapy as needed to address soft tissue restrictions in hips and low back (L>R) and progressive emphasis on sx centralization, pain free mobility, and core/hip stabilization as tolerated      Assessment: [x] Progressing toward goals. [] No change. [] Other:    [x] Patient would continue to benefit from skilled physical therapy services in order to: help centralize sx, improve pain free lumbar AROM, and maximize neuromuscular strength & stability necessary for unrestricted ADL/work tolerances at her prior level of function    : Contitnued to focus on core strengthening to improve stability and reduce pain. Added heel hovers and Paloff press for additional core strengthening. Good tolerance to exercises overall with slight increase in soreness reported at end of session with no change in pain. STG: (to be met in 6 treatments)  1. Pt will self report worst pain no greater than 3/10 in order to better tolerate ADLs/work activities with minimal dysfunction  2. Pt will improve lumbar AROM to 90% or greater in all planes in order to demonstrate ability to move/reach in all planes unrestricted at PLOF  LTG: (to be met in 12 treatments)  1. Pt will demonstrate improved B LE strength to 4/5 or greater in order to demonstrate improved stability/strength necessary for unrestricted ADLs/work activities   2. Pt will self report ability to complete all work duties with pain no greater than 1/10 to demonstrate unrestricted functional tolerances at prior level  3. Pt will improve core strength Sahrmann Grade 3/5 or greater to demonstrate better support and stability to lumbar spine  4. Pt will decrease SHEY to 10% impaired or less in order to demonstrate improved functional tolerances at PLOF with minimal restriction/dysfunction  5. Pt will demonstrate independence with a long term HEP for continued progress/maintenance after completion of PT    Pt. Education:  [x] Yes  [] No  [] Reviewed Prior HEP/Ed  Method of Education: [x] Verbal  [x] Demo  [] Written  Comprehension of Education:  [] Verbalizes understanding. [] Demonstrates understanding. [] Needs review. [] Demonstrates/verbalizes HEP/Ed previously given. Plan: [x] Continue per plan of care.    [] Other:      Treatment Charges: Mins Units   []  Modalities     [x]  Ther Exercise 31 2   [x]  Manual Therapy 10 1   []  Ther Activities     []  Aquatics     []  Neuromuscular     [] Vasocompression     [] Gait Training     [] Dry needling        [] 1 or 2 muscles        [] 3 or more muscles     []  Other     Total Treatment time 41 3     Time In: 8:02 am            Time Out: 8:43 am     Electronically signed by:  Earlene Diaz PTA

## 2021-09-13 NOTE — TELEPHONE ENCOUNTER
[facility] pending for refill     Health Maintenance   Topic Date Due    Shingles Vaccine (1 of 2) Never done    Diabetic retinal exam  01/12/2017    DTaP/Tdap/Td vaccine (2 - Tdap) 12/15/2020    Flu vaccine (1) 09/01/2021    Diabetic foot exam  11/12/2021 (Originally 12/1/2017)    Diabetic microalbuminuria test  06/11/2022    A1C test (Diabetic or Prediabetic)  06/24/2022    Lipid screen  06/24/2022    Potassium monitoring  07/21/2022    Creatinine monitoring  07/21/2022    Colon cancer screen colonoscopy  09/20/2022    Breast cancer screen  11/14/2022    Pneumococcal 0-64 years Vaccine (2 of 2 - PPSV23) 03/10/2026    COVID-19 Vaccine  Completed    Hepatitis C screen  Completed    HIV screen  Completed    Hepatitis A vaccine  Aged Out    Hib vaccine  Aged Out    Meningococcal (ACWY) vaccine  Aged Out             (applicable per patient's age: Cancer Screenings, Depression Screening, Fall Risk Screening, Immunizations)    Hemoglobin A1C (%)   Date Value   06/24/2021 7.9 (H)   04/20/2021 7.3   12/08/2020 7.9     Microalb/Crt.  Ratio (mcg/mg creat)   Date Value   06/11/2021 CANNOT BE CALCULATED     LDL Cholesterol (mg/dL)   Date Value   06/24/2021 52     AST (U/L)   Date Value   02/04/2021 18     ALT (U/L)   Date Value   02/04/2021 20     BUN (mg/dL)   Date Value   07/21/2021 30 (H)      (goal A1C is < 7)   (goal LDL is <100) need 30-50% reduction from baseline     BP Readings from Last 3 Encounters:   08/17/21 118/73   08/14/21 (!) 145/77   06/16/21 114/68    (goal /80)      All Future Testing planned in [facility]:  Lab Frequency Next Occurrence   EKG 12 Lead Once 02/09/2021   COVID-19 Once 02/12/2021       Next Visit Date:  Future Appointments   Date Time Provider Nicola Zaidi   9/14/2021  8:30 AM Archana George MD [facility]   9/15/2021  8:45 AM Heidi Bee, PT [facility]   9/20/2021  8:00 AM Billie Domínguez, MARGO [facility] [place]   9/22/2021  8:45 AM Heidi Bee, PT [facility]   9/27/2021  8:00 AM Kelly Job, PTA [facility]   9/29/2021  8:45 AM Lisa Bodily, PT [facility]   10/4/2021  8:00 AM Lisa Bodily, PT [facility]   10/6/2021  8:00 AM Kelly Job, PTA [facility]   10/11/2021  8:00 AM Lisa Bodily, PT [facility]   10/13/2021  8:00 AM Lisa Bodily, PT [facility]            Patient Active Problem List:     Cervical spondylosis     Type II or unspecified type diabetes mellitus without mention of complication, not stated as uncontrolled     Hypertension     Abnormal auditory perception     Eustachian tube dysfunction     Chronic serous otitis media     Nasal polyps     Nasal congestion     Osteoarthritis of left knee     Osteoarthritis of right knee     DIEGO (nonalcoholic steatohepatitis)     Vitamin D deficiency     Iron deficiency anemia     Dyslipidemia     Diabetes mellitus type 2, uncontrolled (HCC)     Left hip pain     Trochanteric bursitis     Fatigue     Daytime somnolence     Snoring     Chronic left shoulder pain     Restless legs syndrome     Generalized anxiety disorder     Primary osteoarthritis, left shoulder     Tendinopathy of left shoulder     Adhesive capsulitis of left shoulder

## 2021-09-14 ENCOUNTER — OFFICE VISIT (OUTPATIENT)
Dept: FAMILY MEDICINE CLINIC | Age: 61
End: 2021-09-14
Payer: COMMERCIAL

## 2021-09-14 VITALS
WEIGHT: 194 LBS | HEIGHT: 63 IN | SYSTOLIC BLOOD PRESSURE: 130 MMHG | DIASTOLIC BLOOD PRESSURE: 74 MMHG | BODY MASS INDEX: 34.38 KG/M2 | TEMPERATURE: 99.1 F | HEART RATE: 88 BPM

## 2021-09-14 DIAGNOSIS — G89.29 CHRONIC RIGHT-SIDED LOW BACK PAIN WITH RIGHT-SIDED SCIATICA: ICD-10-CM

## 2021-09-14 DIAGNOSIS — E11.9 TYPE 2 DIABETES MELLITUS WITHOUT COMPLICATION, WITHOUT LONG-TERM CURRENT USE OF INSULIN (HCC): Primary | ICD-10-CM

## 2021-09-14 DIAGNOSIS — G89.29 CHRONIC LEFT-SIDED LOW BACK PAIN WITH LEFT-SIDED SCIATICA: ICD-10-CM

## 2021-09-14 DIAGNOSIS — M54.41 CHRONIC RIGHT-SIDED LOW BACK PAIN WITH RIGHT-SIDED SCIATICA: ICD-10-CM

## 2021-09-14 DIAGNOSIS — M54.42 CHRONIC LEFT-SIDED LOW BACK PAIN WITH LEFT-SIDED SCIATICA: ICD-10-CM

## 2021-09-14 LAB — HBA1C MFR BLD: 7.7 %

## 2021-09-14 PROCEDURE — 90686 IIV4 VACC NO PRSV 0.5 ML IM: CPT | Performed by: FAMILY MEDICINE

## 2021-09-14 PROCEDURE — 83036 HEMOGLOBIN GLYCOSYLATED A1C: CPT | Performed by: FAMILY MEDICINE

## 2021-09-14 PROCEDURE — 99211 OFF/OP EST MAY X REQ PHY/QHP: CPT | Performed by: FAMILY MEDICINE

## 2021-09-14 PROCEDURE — 3051F HG A1C>EQUAL 7.0%<8.0%: CPT | Performed by: FAMILY MEDICINE

## 2021-09-14 PROCEDURE — 99213 OFFICE O/P EST LOW 20 MIN: CPT | Performed by: FAMILY MEDICINE

## 2021-09-14 RX ORDER — TIZANIDINE 2 MG/1
2 TABLET ORAL EVERY 6 HOURS PRN
Qty: 30 TABLET | Refills: 0 | Status: SHIPPED | OUTPATIENT
Start: 2021-09-14 | End: 2022-03-28 | Stop reason: ALTCHOICE

## 2021-09-14 NOTE — PROGRESS NOTES
Vaccine Information Sheet, \"Influenza - Inactivated\"  given to [employer], or parent/legal guardian of  [employer] and verbalized understanding. Patient responses:    Have you ever had a reaction to a flu vaccine? No  Do you have any current illness? No  Have you ever had [place] Syndrome? No  Do you have a serious allergy to any of the following: Neomycin, Polymyxin, Thimerosal, eggs or egg products? No    Flu vaccine given per order. Please see immunization tab. Risks and benefits explained. Current VIS given.

## 2021-09-14 NOTE — PROGRESS NOTES
Diabetic visit information    BP Readings from Last 3 Encounters:   08/17/21 118/73   08/14/21 (!) 145/77   06/16/21 114/68       Hemoglobin A1C (%)   Date Value   06/24/2021 7.9 (H)   04/20/2021 7.3   12/08/2020 7.9     Microalb/Crt. Ratio (mcg/mg creat)   Date Value   06/11/2021 CANNOT BE CALCULATED     LDL Cholesterol (mg/dL)   Date Value   06/24/2021 52               Have you changed or started any medications since your last visit including any over-the-counter medicines, vitamins, or herbal medicines? no   Have you stopped taking any of your medications? Is so, why? -  no  Are you having any side effects from any of your medications? - no    Have you seen any other physician or provider since your last visit?  no   Have you had any other diagnostic tests since your last visit?  no   Have you been seen in the emergency room and/or had an admission in a hospital since we last saw you?  no     Have you had your annual diabetic retinal (eye) exam? Yes   (ensure copy of exam is in the chart)    Have you had your routine dental cleaning in the past 6 months? no    Do you have an active MyChart account? If not, what are your barriers? Yes    Patient Care Team:  Paxton Zuñiga MD as PCP - General (Family Medicine)  Paxton Zu[facility]iga MD as [facility]  Wes Lin MD as Consulting Physician (Gastroenterology)    Medical history Review  Past Medical, Family, and Social History reviewed and does not contribute to the patient presenting condition.     Health Maintenance   Topic Date Due    Shingles Vaccine (1 of 2) Never done    Diabetic retinal exam  01/12/2017    DTaP/Tdap/Td vaccine (2 - Tdap) 12/15/2020    Flu vaccine (1) 09/01/2021    Diabetic foot exam  11/12/2021 (Originally 12/1/2017)    Diabetic microalbuminuria test  06/11/2022    A1C test (Diabetic or Prediabetic)  06/24/2022    Lipid screen  06/24/2022    Potassium monitoring  07/21/2022    Creatinine monitoring  07/21/2022    Colon cancer screen colonoscopy  09/20/2022    Breast cancer screen  11/14/2022    Pneumococcal 0-64 years Vaccine (2 of 2 - PPSV23) 03/10/2026    COVID-19 Vaccine  Completed    Hepatitis C screen  Completed    HIV screen  Completed    Hepatitis A vaccine  Aged Out    Hib vaccine  Aged Out    Meningococcal (ACWY) vaccine  Aged Out

## 2021-09-14 NOTE — PROGRESS NOTES
Subjective:       Maynard Brunner is an 62 y.o. female who presents for follow up of diabetes. Current symptoms include: none. Patient denies none. Evaluation to date has included: fasting blood sugar, fasting lipid panel, hemoglobin A1C and microalbuminuria. Home sugars: BGs consistently in an acceptable range. Current treatments: more intensive attention to diet which has been Yes: Trtulicity dose increased, which has been effective. and Discontinued Repaglinide which has been Yes: NA, which has been effective. . Last dilated eye exam done in May. Follow up of low back problems. Current symptoms include: numbness in lat thigh on Left and pain in back (aching in character; 7/10 in severity). Symptoms have improved from the previous visit. Exacerbating factors identified by the patient are none. Patient's medications, allergies, past medical, surgical, social and family histories were reviewed and updated as appropriate. Review of Systems  Constitutional: negative  Respiratory: negative  Cardiovascular: negative  Gastrointestinal: negative  Musculoskeletal:positive for back pain      Objective:      /74 (Site: Right Lower Arm, Position: Sitting, Cuff Size: Medium Adult) Comment: machine  Pulse 88   Temp 99.1 °F (37.3 °C) (Temporal)   Ht 5' 3\" (1.6 m)   Wt 194 lb (88 kg)   BMI 34.37 kg/m²   General appearance: alert, appears stated age and cooperative  Back: symmetric, no curvature. ROM normal. No CVA tenderness. , Tender S SI joint. Lungs: clear to auscultation bilaterally  Heart: regular rate and rhythm, S1, S2 normal, no murmur, click, rub or gallop  Abdomen: soft, non-tender; bowel sounds normal; no masses,  no organomegaly  Extremities: extremities normal, atraumatic, no cyanosis or edema    Laboratory:  No components found for: A1C      Assessment:      Diabetes mellitus Type II, under good control. - A1c 7.7Suggested low cholesterol diet.   Reminded to get yearly retinal exam. LBP-Sciatica possibly due to degenerative joint disease at intervertebral facet joints -Stretching exercises discussed with patient and written info given. Muscle relaxants per medication orders. Orthopedic referral  PT referral    Labs reviewed with the patient. HM- Flu shot given.

## 2021-09-15 ENCOUNTER — TELEPHONE (OUTPATIENT)
Dept: FAMILY MEDICINE CLINIC | Age: 61
End: 2021-09-15

## 2021-09-15 ENCOUNTER — HOSPITAL ENCOUNTER (OUTPATIENT)
Dept: PHYSICAL THERAPY | Age: 61
Setting detail: THERAPIES SERIES
Discharge: HOME | End: 2021-09-15
Payer: COMMERCIAL

## 2021-09-15 PROCEDURE — 97140 MANUAL THERAPY 1/> REGIONS: CPT

## 2021-09-15 PROCEDURE — 97110 THERAPEUTIC EXERCISES: CPT

## 2021-09-15 NOTE — FLOWSHEET NOTE
[address] [facility]   [id number] [facility] [address]   Phone: [phone]   Fax: [phone]    Physical Therapy Daily Treatment Note      Date:  9/15/2021  Patient Name:  Jenny Wiley    :  1960  MRN: [id number]  Physician: Bran Leija MD     Insurance: Medical [place]. Visits BMN. Diagnosis: M54.17 (ICD-10-CM) - Lumbosacral radiculopathy    Onset Date: 2021   Next Dr. Parra Edith: 21  Visit# / total visits:   Cancels/No Shows: 0/0    Subjective:    Pain:  [x] Yes  [] No Location: L posterior hip Pain Rating: (0-10 scale) 6/10  Pain altered Tx:  [] No  [] Yes  Action:  Comments: 9/15: Pt states that her pain has been improving slightly but still feels a fair amount of discomfort in the back of her L hip and numbness into the lateral aspect of her L LE. Does report that getting up out of a chair after a prolonged period of sitting had gotten better. 6/10 discomfort currently in L hip.      Objective:  Modalities:   Precautions: None  Exercises:  Exercise Reps/ Time Weight/ Level Completed  Today Comments   Manual therapy 10'  x MFR to L glute max/med and piriformis          Prone prop       Bridges with 3'' hold 15 reps x 2  x    LTRs 20 reps  x    Supine marching (2 up- 2 down) with TrA activation 10 reps x 2  x    Seated figure 4 30'' x 3  x    Sidelying clams 15 reps x 2   BLE   TrA Activation with heel hover 10 reps x 2             Child's pose 30'' x 3  x           Slow standing marches 30 reps x 2  x 2-3'' hold emphasizing upright posture and core stability   Forward step ups 20 reps 6'' x L leading; brief SLS hold at top   Lateral step ups 20 reps 6''  x L leading; brief SLS hold at top   Other:    Specific Instructions for next treatment: Manual therapy as needed to address soft tissue restrictions in hips and low back (L>R) and progressive emphasis on sx centralization, pain free mobility, and core/hip stabilization as tolerated      Assessment: [x] Progressing toward goals. [] No change. [] Other:    [x] Patient would continue to benefit from skilled physical therapy services in order to: help centralize sx, improve pain free lumbar AROM, and maximize neuromuscular strength & stability necessary for unrestricted ADL/work tolerances at her prior level of function    9/15: Sx seem to have centralized well to L glute region but continues to have persistent numbness around lateral aspect of L LE. Continues to demonstrate soft tissue dysfunction at L glute region but otherwise is able to better tolerate usual ADLs and work activities. Progressed to more standing strengthening and stabilization activities as pt seems to have difficulty stabilizing through L stance phase of gait, evident by contralateral trunk lean. Added slow standing marches emphasizing upright posture, and loading of L hip with forward and lateral step ups. No increased pain with progressions but fatigue evident after standing activities. Minimal pain and pt reporting that her hip felt looser by end of session today. STG: (to be met in 6 treatments)  1. Pt will self report worst pain no greater than 3/10 in order to better tolerate ADLs/work activities with minimal dysfunction  2. Pt will improve lumbar AROM to 90% or greater in all planes in order to demonstrate ability to move/reach in all planes unrestricted at PLOF  LTG: (to be met in 12 treatments)  1. Pt will demonstrate improved B LE strength to 4/5 or greater in order to demonstrate improved stability/strength necessary for unrestricted ADLs/work activities   2. Pt will self report ability to complete all work duties with pain no greater than 1/10 to demonstrate unrestricted functional tolerances at prior level  3. Pt will improve core strength Sahrmann Grade 3/5 or greater to demonstrate better support and stability to lumbar spine  4.  Pt will decrease SHEY to 10% impaired or less in order to demonstrate improved functional tolerances at PLOF with minimal restriction/dysfunction  5. Pt will demonstrate independence with a long term HEP for continued progress/maintenance after completion of PT    Pt. Education:  [x] Yes  [] No  [] Reviewed Prior HEP/Ed  Method of Education: [x] Verbal  [x] Demo  [] Written  Comprehension of Education:  [x] Verbalizes understanding. [x] Demonstrates understanding. [] Needs review. [] Demonstrates/verbalizes HEP/Ed previously given. Plan: [x] Continue per plan of care.    [] Other:      Treatment Charges: Mins Units   []  Modalities     [x]  Ther Exercise 29 2   [x]  Manual Therapy 10 1   []  Ther Activities     []  Aquatics     []  Neuromuscular     [] Vasocompression     [] Gait Training     [] Dry needling        [] 1 or 2 muscles        [] 3 or more muscles     []  Other     Total Treatment time 39 3     Time In: 8:45am            Time Out: 9:24am     Electronically signed by:  Ardith Gowers, PT

## 2021-09-20 ENCOUNTER — HOSPITAL ENCOUNTER (OUTPATIENT)
Dept: PHYSICAL THERAPY | Age: 61
Setting detail: THERAPIES SERIES
Discharge: HOME | End: 2021-09-20
Payer: COMMERCIAL

## 2021-09-20 PROCEDURE — 97140 MANUAL THERAPY 1/> REGIONS: CPT

## 2021-09-20 PROCEDURE — 97110 THERAPEUTIC EXERCISES: CPT

## 2021-09-20 NOTE — FLOWSHEET NOTE
[address] [facility] Physical [facility]   [address] [facility] [address]   Phone: [phone]   Fax: [phone]    Physical Therapy Daily Treatment Note      Date:  2021  Patient Name:  Raghav Fitzpatrick    :  1960  MRN: [id number]  Physician: Aleksandr Avila MD     Insurance: Medical [place]. Visits BMN. Diagnosis: M54.17 (ICD-10-CM) - Lumbosacral radiculopathy    Onset Date: 2021   Next Dr. Lakisha Brito: 21  Visit# / total visits:   Cancels/No Shows: 0/0    Subjective:    Pain:  [x] Yes  [] No Location: L posterior hip Pain Rating: (0-10 scale) 6/10  Pain altered Tx:  [] No  [] Yes  Action:  Comments: : Patient reports today that numbness that she has been feeling has decreased. Largest concern continues to be high pain levels which has not significantly improved since beginning therapy. Patient questioned appropriateness of aquatic therapy. Objective:  Modalities:   Precautions: None  Exercises:  Exercise Reps/ Time Weight/ Level Completed  Today Comments   Manual therapy 10'  x MFR to L glute max/med and piriformis          Prone prop       Bridges with 3'' hold 15 reps x 2  x    LTRs 20 reps  x    Supine marching (2 up- 2 down) with TrA activation 10 reps x 2  x    Seated figure 4 30'' x 3  x    Sidelying clams 15 reps x 2   BLE   TrA Activation with heel hover 10 reps x 2             Child's pose 30'' x 3  x           Slow standing marches 30 reps x 2  x 2-3'' hold emphasizing upright posture and core stability   Forward step ups 20 reps 6'' x L leading; brief SLS hold at top   Lateral step ups 20 reps 6''  x L leading; brief SLS hold at top   Other:    Specific Instructions for next treatment: Manual therapy as needed to address soft tissue restrictions in hips and low back (L>R) and progressive emphasis on sx centralization, pain free mobility, and core/hip stabilization as tolerated      Assessment: [x] Progressing toward goals. [] No change.      [] Other:    [x] Patient would continue to benefit from skilled physical therapy services in order to: help centralize sx, improve pain free lumbar AROM, and maximize neuromuscular strength & stability necessary for unrestricted ADL/work tolerances at her prior level of function    9/15: Educated patient on indications of aquatics and benefits/disadvantages. Deferred all other questions on aquatics and potential appropriateness for individual case to primary PT. Able to decrease pain slightly with manual and stretches to 5/10. Hip instability/weakness continues to be evident with standing single leg exercises with patient demonstrating inability to perform steps and marches without occasional UE support. Patient demonstrated less use of UE with cues to minimize. STG: (to be met in 6 treatments)  1. Pt will self report worst pain no greater than 3/10 in order to better tolerate ADLs/work activities with minimal dysfunction  2. Pt will improve lumbar AROM to 90% or greater in all planes in order to demonstrate ability to move/reach in all planes unrestricted at PLOF  LTG: (to be met in 12 treatments)  1. Pt will demonstrate improved B LE strength to 4/5 or greater in order to demonstrate improved stability/strength necessary for unrestricted ADLs/work activities   2. Pt will self report ability to complete all work duties with pain no greater than 1/10 to demonstrate unrestricted functional tolerances at prior level  3. Pt will improve core strength Sahrmann Grade 3/5 or greater to demonstrate better support and stability to lumbar spine  4. Pt will decrease SHEY to 10% impaired or less in order to demonstrate improved functional tolerances at PLOF with minimal restriction/dysfunction  5. Pt will demonstrate independence with a long term HEP for continued progress/maintenance after completion of PT    Pt.  Education:  [x] Yes  [] No  [] Reviewed Prior HEP/Ed  Method of Education: [x] Verbal  [x] Demo  [] Written  Comprehension of Education:  [x] Verbalizes understanding. [x] Demonstrates understanding. [] Needs review. [] Demonstrates/verbalizes HEP/Ed previously given. Plan: [x] Continue per plan of care.    [] Other:      Treatment Charges: Mins Units   []  Modalities     [x]  Ther Exercise 34 2   [x]  Manual Therapy 10 1   []  Ther Activities     []  Aquatics     []  Neuromuscular     [] Vasocompression     [] Gait Training     [] Dry needling        [] 1 or 2 muscles        [] 3 or more muscles     []  Other     Total Treatment time 44 3     Time In: 8:01 am            Time Out: 8:45 am     Electronically signed by:  Precious Britton PTA

## 2021-09-22 ENCOUNTER — HOSPITAL ENCOUNTER (OUTPATIENT)
Dept: PHYSICAL THERAPY | Age: 61
Setting detail: THERAPIES SERIES
Discharge: HOME | End: 2021-09-22
Payer: COMMERCIAL

## 2021-09-22 PROCEDURE — 97110 THERAPEUTIC EXERCISES: CPT

## 2021-09-22 NOTE — PROGRESS NOTES
[facility] Outpatient Physical Therapy   [address] [facility] [address]   Phone: [phone]   Fax: [phone]    Physical Therapy Daily Treatment Note      Date:  2021  Patient Name:  Morena Vigil    :  1960  MRN: [id number]  Physician: Jammie Ramos MD     Insurance: Medical [place]. Visits BMN. Diagnosis: M54.17 (ICD-10-CM) - Lumbosacral radiculopathy    Onset Date: 2021   Next Dr. Howe Solum: 21 with Dr. Kaz Kimbrough  Visit# / total visits:    Cancels/No Shows: 0/0    Subjective:    Pain:  [x] Yes  [] No Location: L posterior hip Pain Rating: (0-10 scale) 8/10  Pain altered Tx:  [] No  [] Yes  Action:  Comments: : Pt reports higher pain levels than usual today which she attributes possibly from weather changes today. Feels like she's made some functional progress since starting therapy but expresses frustration with continued high pain levels and numbness in her L lateral lower leg. Has an initial consult scheduled with Dr. Kza Kimbrough on . Asks about whether or not aquatic therapy could be beneficial for her. Formal progress note performed today for reporting period -. Objective:  All below objective measures updated       Range of Motion  Left Range of Motion  Right Strength  Left Strength  Right   Lumbar Flexion 75%  L LE pain 75%       Lumbar Extension 90%  Discomfort/stiffness 90%  Discomfort/stiffness       Lumbar Rotation 90% 90%       Lumbar Side Bend 100%   100%  Tightness and discomfort in L hip       Hip Flexion     3+/5 4/5   Hip Abduction     4-/5 4-/5   Hip Adduction     4/5 4/5   Hip Extension     3+/5 4/5   Hip ER           Hip IR           Knee Flexion     4-/5 4/5   Knee Extension     3-/5  Pain with active knee ext in sitting 4/5   Dorsiflexion     4/5 4/5   Plantar flexion     4/5  4/5   Inversion           Eversion           *All LE MMT assessed grossly in modified (seated) positioning     Core Strength: Sahrmann Grade 2/5      Modalities:   Precautions: None  Exercises:  Exercise Reps/ Time Weight/ Level Completed  Today Comments   Manual therapy 10'   MFR to L glute max/med and piriformis          Prone prop       Bridges with 3'' hold 15 reps x 2  x    LTRs 20 reps      Supine marching (2 up- 2 down) with TrA activation 10 reps x 2  x    Supine sciatic nerve glides (90-90) 10 reps x 2  x Towel behind L knee   Seated figure 4 30'' x 3  x    Sidelying clams 15 reps x 2   BLE   TrA Activation with heel hover 10 reps x 2             Child's pose 30'' x 3             Slow standing marches 30 reps x 2   2-3'' hold emphasizing upright posture and core stability   Forward step ups 20 reps 6''  L leading; brief SLS hold at top   Lateral step ups 20 reps 6''   L leading; brief SLS hold at top   Patient education   x Sitting posture and neutral lumbar spine at work station    Other: Re-evaluation of all subjective & objective measures 9/22- billed as therex    Specific Instructions for next treatment: Manual therapy as needed to address soft tissue restrictions in hips and low back (L>R) and progressive emphasis on sx centralization, pain free mobility, and core/hip stabilization as tolerated. Aquatic therapy 1x/week for stabilization activities in gravity reduced environment. Assessment: [x] Progressing toward goals. [] No change. [] Other:    [x] Patient would continue to benefit from skilled physical therapy services in order to: help centralize sx, improve pain free lumbar AROM, and maximize neuromuscular strength & stability necessary for unrestricted ADL/work tolerances at her prior level of function    9/22: Pt has been seen for 6 PT visits to date for her L sided lumbar radiculopathy. Pt has made some progress to date in regard to functional tolerances with ADL and work activities, but continues to report moderate to high pain levels in L posterior hip/thigh region with numbness in L lateral lower leg.  Good improvement in lumbar AROM in all planes since initial evaluation as slight improvement in LE/core strength, but continues to be inhibited/diminished. Also still has fairly high levels of sciatic nerve irritability with pt unable to actively extend knee fully in sitting without increased neural sx. Pt's sx seem to improve with stabilization exercises and extension based mobilization but relief seems to be temporary, lasting only 12-24 hours. Discussed aquatic therapy today as pt may benefit from 1x/week doing traditional land based PT and 1x/week doing aquatic PT for progressive standing stabilization activities in a gravity minimized environment. POC adjusted today to reflect these changes and will continue to monitor response to therapy. Limited treatment time today secondary to lengthy re-evaluation time and discussion of adjustments to POC. STG: (to be met in 12 treatments)  1. Pt will self report worst pain no greater than 3/10 in order to better tolerate ADLs/work activities with minimal dysfunction GOAL PROGRESSING AND EXTENDED 9/22  2. Pt will improve lumbar AROM to 90% or greater in all planes in order to demonstrate ability to move/reach in all planes unrestricted at [place] [id number] 9/22  LTG: (to be met in 16 treatments)  1. Pt will demonstrate improved B LE strength to 4/5 or greater in order to demonstrate improved stability/strength necessary for unrestricted ADLs/work activities GOAL PROGRESSING 9/22  2. Pt will self report ability to complete all work duties with pain no greater than 1/10 to demonstrate unrestricted functional tolerances at prior level  3. Pt will improve core strength Sahrmann Grade 3/5 or greater to demonstrate better support and stability to lumbar spine GOAL PROGRESSING 9/22  4. Pt will decrease SHEY to 10% impaired or less in order to demonstrate improved functional tolerances at PLOF with minimal restriction/dysfunction  5.  Pt will demonstrate independence with a long

## 2021-09-27 ENCOUNTER — HOSPITAL ENCOUNTER (OUTPATIENT)
Dept: PHYSICAL THERAPY | Age: 61
Setting detail: THERAPIES SERIES
Discharge: HOME | End: 2021-09-27
Payer: COMMERCIAL

## 2021-09-27 PROCEDURE — 97140 MANUAL THERAPY 1/> REGIONS: CPT

## 2021-09-27 PROCEDURE — 97110 THERAPEUTIC EXERCISES: CPT

## 2021-09-27 NOTE — PROGRESS NOTES
[address] [facility] Outpatient Physical Therapy   [address] [facility] [address]   Phone: [phone]   Fax: [phone]    Physical Therapy Daily Treatment Note      Date:  2021  Patient Name:  Harley Estrada    :  1960  MRN: [id number]  Physician: Mike Appiah MD     Insurance: [employer]. Visits BMN. Diagnosis: M54.17 (ICD-10-CM) - Lumbosacral radiculopathy    Onset Date: 2021   Next Dr. Gil Cariaser: 21 with Dr. Luz Horne  Visit# / total visits:    Cancels/No Shows: 0/0    Subjective:    Pain:  [x] Yes  [] No Location: L posterior hip Pain Rating: (0-10 scale) 8/10  Pain altered Tx:  [] No  [] Yes  Action:  Comments: : Patient reports today that pain has slightly improved since last visit. Reported that pain overall is relatively unchanged. Objective:  All below objective measures updated       Range of Motion  Left Range of Motion  Right Strength  Left Strength  Right   Lumbar Flexion 75%  L LE pain 75%       Lumbar Extension 90%  Discomfort/stiffness 90%  Discomfort/stiffness       Lumbar Rotation 90% 90%       Lumbar Side Bend 100%   100%  Tightness and discomfort in L hip       Hip Flexion     3+/5 4/5   Hip Abduction     4-/5 4-/5   Hip Adduction     4/5 4/5   Hip Extension     3+/5 4/5   Hip ER           Hip IR           Knee Flexion     4-/5 4/5   Knee Extension     3-/5  Pain with active knee ext in sitting 4/5   Dorsiflexion     4/5 4/5   Plantar flexion     4/5  4/5   Inversion           Eversion           *All LE MMT assessed grossly in modified (seated) positioning     Core Strength: Sahrmann Grade 2/5      Modalities:    Precautions: None  Exercises:  Exercise Reps/ Time Weight/ Level Completed  Today Comments   Manual therapy 10'   MFR to L glute max/med and piriformis          Prone prop       Bridges with 3'' hold 15 reps x 2  x    LTRs 20 reps  x    Supine marching (2 up- 2 down) with TrA activation 10 reps x 2  x    Supine sciatic nerve glides (90-90) 10 reps x 2  x Towel behind L knee   Seated figure 4 30'' x 3  x    Sidelying clams 15 reps x 2   BLE   TrA Activation with heel hover 10 reps x 2             Child's pose 30'' x 3  x           Slow standing marches 30 reps x 2   2-3'' hold emphasizing upright posture and core stability   Forward step ups 20 reps 6'' x L leading; brief SLS hold at top   Lateral step ups 20 reps 6''  x L leading; brief SLS hold at top   Patient education    Sitting posture and neutral lumbar spine at work station    Other:     Specific Instructions for next treatment: Manual therapy as needed to address soft tissue restrictions in hips and low back (L>R) and progressive emphasis on sx centralization, pain free mobility, and core/hip stabilization as tolerated. Aquatic therapy 1x/week for stabilization activities in gravity reduced environment. Assessment: [x] Progressing toward goals. [] No change. [] Other:    [x] Patient would continue to benefit from skilled physical therapy services in order to: help centralize sx, improve pain free lumbar AROM, and maximize neuromuscular strength & stability necessary for unrestricted ADL/work tolerances at her prior level of function    9/27: Continued to work on core and hip strengthening per chart. Jenifer reported no change in pain with nerve glide but did note improved hamstring flexibility with exercise. Good tolerance to exercises performed today with no change in pain/soreness at end of session.      STG: (to be met in 12 treatments)  Pt will self report worst pain no greater than 3/10 in order to better tolerate ADLs/work activities with minimal dysfunction GOAL PROGRESSING AND EXTENDED 9/22  Pt will improve lumbar AROM to 90% or greater in all planes in order to demonstrate ability to move/reach in all planes unrestricted at [place] [id number] 9/22  LTG: (to be met in 16 treatments)  Pt will demonstrate improved B LE strength to 4/5 or greater in order to demonstrate improved stability/strength necessary for unrestricted ADLs/work activities GOAL PROGRESSING 9/22  Pt will self report ability to complete all work duties with pain no greater than 1/10 to demonstrate unrestricted functional tolerances at prior level  Pt will improve core strength Sahrmann Grade 3/5 or greater to demonstrate better support and stability to lumbar spine GOAL PROGRESSING 9/22  Pt will decrease SHEY to 10% impaired or less in order to demonstrate improved functional tolerances at PLOF with minimal restriction/dysfunction  Pt will demonstrate independence with a long term HEP for continued progress/maintenance after completion of PT    Pt. Education:  [x] Yes  [] No  [] Reviewed Prior HEP/Ed  Method of Education: [x] Verbal  [x] Demo  [] Written  Comprehension of Education:  [x] Verbalizes understanding. [x] Demonstrates understanding. [] Needs review. [] Demonstrates/verbalizes HEP/Ed previously given. Plan: [x] Continue per plan of care.    [] Other:           Treatment Charges: Mins Units   []  Modalities     [x]  Ther Exercise 30 2   [x]  Manual Therapy 10 1   []  Ther Activities     []  Aquatics     []  Neuromuscular     [] Vasocompression     [] Gait Training     [] Dry needling        [] 1 or 2 muscles        [] 3 or more muscles     []  Other     Total Treatment time 45 3     Time In: 8:05 am            Time Out: 8:45 am    Electronically signed by:  Gonzalo Salcedo PTA

## 2021-09-28 ENCOUNTER — OFFICE VISIT (OUTPATIENT)
Dept: ORTHOPEDIC SURGERY | Age: 61
End: 2021-09-28
Payer: COMMERCIAL

## 2021-09-28 VITALS — HEIGHT: 63 IN | RESPIRATION RATE: 16 BRPM | BODY MASS INDEX: 34.38 KG/M2 | WEIGHT: 194 LBS

## 2021-09-28 DIAGNOSIS — M54.50 LOW BACK PAIN, UNSPECIFIED BACK PAIN LATERALITY, UNSPECIFIED CHRONICITY, UNSPECIFIED WHETHER SCIATICA PRESENT: Primary | ICD-10-CM

## 2021-09-28 DIAGNOSIS — M54.16 LUMBAR RADICULAR PAIN: ICD-10-CM

## 2021-09-28 PROCEDURE — 99203 OFFICE O/P NEW LOW 30 MIN: CPT | Performed by: ORTHOPAEDIC SURGERY

## 2021-09-28 NOTE — PROGRESS NOTES
Patient ID: Mitzi Neumann is a 62 y.o. female    Chief Compliant:  No chief complaint on file. Diagnostic imaging:  cT scan lumbar spine 8/2021 age-appropriate some lumbar spondylosis greatest at L4-5 and L5-S1    Diagnostic x-rays 2018 age-appropriate      Assessment and Plan:  1. Low back pain, unspecified back pain laterality, unspecified chronicity, unspecified whether sciatica present      Low back pain with left radicular leg pain and numbness in the S1 distribution. Patient with claudication symptoms    Failure of 4 weeks of PT     MRI lumbar spine    Follow up 3 weeks    HPI:  This is a 62 y.o. female who presents to the clinic today as a new patient for lower back evaluation. Patient with left sided lower back pain with left radicular leg pain to the lateral hip. Patient also notes numbness along the left lateral leg to to the lateral toes. She is able to walk long distances however it is difficult and she has improvement with pushing a shopping cart    She has been in PT for about 4 weeks with minimal relief and was given a muscle relaxer by her PCP. Review of Systems   All other systems reviewed and are negative.       Past History:    Current Outpatient Medications:     tiZANidine (ZANAFLEX) 2 MG tablet, Take 1 tablet by mouth every 6 hours as needed (back pain), Disp: 30 tablet, Rfl: 0    rosuvastatin (CRESTOR) 20 MG tablet, TAKE ONE TABLET BY MOUTH NIGHTLY, Disp: 30 tablet, Rfl: 2    busPIRone (BUSPAR) 10 MG tablet, Take 1 tab twice daily as needed, Disp: 180 tablet, Rfl: 3    cyclobenzaprine (FLEXERIL) 10 MG tablet, Take 1 tablet by mouth 3 times daily as needed for Muscle spasms, Disp: 10 tablet, Rfl: 0    ketorolac (TORADOL) 10 MG tablet, Take 1 tablet by mouth every 6 hours as needed for Pain, Disp: 20 tablet, Rfl: 0    predniSONE (DELTASONE) 50 MG tablet, Take 1 tablet by mouth daily, Disp: 4 tablet, Rfl: 0    gabapentin (NEURONTIN) 300 MG capsule, Take 1 capsule by mouth 3 times daily for 7 days.  Intended supply: 30 days, Disp: 21 capsule, Rfl: 0    ASPIRIN ADULT LOW STRENGTH 81 MG EC tablet, TAKE 1 TABLET BY MOUTH ONE TIME A DAY, Disp: 30 tablet, Rfl: 2    omeprazole (PRILOSEC) 20 MG delayed release capsule, Take 1 capsule by mouth 2 times daily, Disp: 180 capsule, Rfl: 3    rOPINIRole (REQUIP) 2 MG tablet, TAKE 2 TABLETS BY MOUTH NIGHTLY, Disp: 180 tablet, Rfl: 3    vitamin D (ERGOCALCIFEROL) 1.25 MG (54195 UT) CAPS capsule, Take 1 capsule by mouth once a week, Disp: 24 capsule, Rfl: 0    lisinopril (PRINIVIL;ZESTRIL) 40 MG tablet, TAKE 1 TABLET BY MOUTH ONE TIME A DAY, Disp: 90 tablet, Rfl: 2    glipiZIDE (GLUCOTROL) 10 MG tablet, TAKE 1 TABLET BY MOUTH 2 TIMES A DAY BEFORE MEALS (REPLACES GLYBURIDE), Disp: 180 tablet, Rfl: 1    TRULICITY 1.5 OF/0.4JR SOPN, INJECT THE CONTENTS OF ONE SYRINGE UNDER THE SKIN ONCE WEEKLY, Disp: 1 pen, Rfl: 2    albuterol sulfate HFA (PROVENTIL HFA) 108 (90 Base) MCG/ACT inhaler, Inhale 2 puffs into the lungs every 4 hours as needed for Wheezing, Disp: 2 Inhaler, Rfl: 5    chlorthalidone (HYGROTON) 25 MG tablet, TAKE 1 TABLET BY MOUTH ONE TIME A DAY, Disp: 90 tablet, Rfl: 1    SYNJARDY 5-1000 MG TABS, TAKE 1 TABLET BY MOUTH 2 TIMES A DAY, Disp: 180 tablet, Rfl: 2    blood glucose test strips (PRODIGY NO CODING BLOOD GLUC) strip, Use to test once daily and as needed as directed by provider, Disp: 100 each, Rfl: 3    diclofenac sodium (VOLTAREN) 1 % GEL, Apply 2 g topically 2 times daily (Patient taking differently: Apply 2 g topically 2 times daily Prn pain), Disp: 100 g, Rfl: 2    ondansetron (ZOFRAN) 4 MG tablet, Take 1 tablet by mouth 3 times daily as needed for Nausea or Vomiting, Disp: 30 tablet, Rfl: 0    acetaminophen (ACETAMINOPHEN EXTRA STRENGTH) 500 MG tablet, Take 1,000 mg by mouth daily as needed for Pain, Disp: , Rfl:     bimatoprost (LUMIGAN) 0.01 % SOLN ophthalmic drops, Place 1 drop into both eyes nightly, Disp: , Rfl:     PRODIGY LANCETS 28G MISC, 1 Bottle by Does not apply route three times daily, Disp: 100 each, Rfl: 3    Blood Glucose Monitoring Suppl (PRODIGY AUTOCODE BLOOD GLUCOSE) w/Device KIT, 1 Device by Does not apply route 3 times daily, Disp: 1 kit, Rfl: 0  Allergies   Allergen Reactions    Codeine Nausea And Vomiting     Social History     Socioeconomic History    Marital status:      Spouse name: Not on file    Number of children: Not on file    Years of education: Not on file    Highest education level: Not on file   Occupational History    Not on file   Tobacco Use    Smoking status: Never Smoker    Smokeless tobacco: Never Used   Vaping Use    Vaping Use: Never used   Substance and Sexual Activity    Alcohol use: Yes     Alcohol/week: 0.0 standard drinks     Comment: rarely/ 4 times a year    Drug use: No    Sexual activity: Yes   Other Topics Concern    Not on file   Social History Narrative    Not on file     Social Determinants of Health     Financial Resource Strain:     Difficulty of Paying Living Expenses:    Food Insecurity:     Worried About Running Out of Food in the Last Year:     [address] in the Last Year:    Transportation Needs:     Lack of Transportation (Medical):      Lack of Transportation (Non-Medical):    Physical Activity:     Days of Exercise per Week:     Minutes of Exercise per Session:    Stress:     Feeling of Stress :    Social Connections:     Frequency of Communication with Friends and Family:     Frequency of Social Gatherings with Friends and Family:     Attends Jain Services:     Active Member of Clubs or Organizations:     Attends Club or Organization Meetings:     Marital Status:    Intimate Partner Violence:     Fear of Current or Ex-Partner:     Emotionally Abused:     Physically Abused:     Sexually Abused:      Past Medical History:   Diagnosis Date    Cervical spondylosis 4/2009    c-4 c-5, c-5 c-6, c-6 c-7    have reviewed the chart and discharge instructions and agree that the records reflect my personal performance and is accurate and complete. Julian Almaraz MD 9/28/21     Scribe Attestation:  By signing my name below, Ximena Gonzalez, attest that this documentation has been prepared under the direction and in the presence of Dr. Terald Goltz. Electronically signed: Oseas Murphy, 9/28/21     Please note that this chart was generated using voice recognition Dragon dictation software. Although every effort was made to ensure the accuracy of this automated transcription, some errors in transcription may have occurred.

## 2021-09-29 ENCOUNTER — APPOINTMENT (OUTPATIENT)
Dept: PHYSICAL THERAPY | Age: 61
End: 2021-09-29
Payer: COMMERCIAL

## 2021-10-01 ENCOUNTER — HOSPITAL ENCOUNTER (OUTPATIENT)
Dept: PHYSICAL THERAPY | Age: 61
Setting detail: THERAPIES SERIES
Discharge: HOME | End: 2021-10-01
Payer: COMMERCIAL

## 2021-10-01 PROCEDURE — 97113 AQUATIC THERAPY/EXERCISES: CPT

## 2021-10-01 NOTE — PROGRESS NOTES
[address] [place] [address] Outpatient Physical Therapy   [address] [facility] [address]   Phone: [phone]   Fax: [phone]    Physical Therapy Daily Treatment Note      Date:  10/1/2021  Patient Name:  Uvaldo Dunaway    :  1960  MRN: [id number]  Physician: Gregorio Gill MD     Insurance: [employer]. Visits BMN. Diagnosis: M54.17 (ICD-10-CM) - Lumbosacral radiculopathy    Onset Date: 2021   Next Dr. Crowell Lj: 21 with Dr. Telma Lopez  Visit# / total visits:    Cancels/No Shows: 0/0    Subjective:    Pain:  [x] Yes  [] No Location: L posterior hip Pain Rating: (0-10 scale) 6/10  Pain altered Tx:  [] No  [] Yes  Action:  Comments: 10/1: Patient arrived with mild headache this date, pain in L posterior hip remains the same, in the morning prior to activity it Is a little lower in intensity. [facility] Exercise Log  Aquatic, Hip & DLS Program- Phase 1    Date of Eval: 21                               Primary PT:PALLAVI Gomes  Diagnosis: Things to Focus On (goals):   Surgical Precautions:  Medical Precautions:  [] C-9 dates  [] Occ Med   [] Medicare     FEARFUL OF DEEP WATER      Date 10/1/21       Visit #        Walk F/L/R 2 laps @ bar       Marching 10x2\"        Squats 10x5\"       Step-Ups F/L        Step Down F/L        Heel-toe raises 10x       SLR F/L/R 10x       Hip/Knee Flex/Ext        F/L Lunges        HS curls  10x               Kickboard Ex.  Sm       Iso Abd. 10x5\"       Push-pull 10x       Paddling                UE Format:        Horiz Abd/Add        IR/ER (wipers)        Alt Flex/Ext        Alt Press Down        Abd/Add                Deep Water: 5FT        Hang 5'       Cycling        Jacks        X-Country                Balance        SLS                Stretches        Achllies        Hamstring                Cool Down 2 laps @ bar        Pain Rating 4             Specific Instructions for next treatment: Manual therapy as needed to address soft tissue restrictions in hips and low back (L>R) and progressive emphasis on sx centralization, pain free mobility, and core/hip stabilization as tolerated. Aquatic therapy 1x/week for stabilization activities in gravity reduced environment. Assessment: [x] Progressing toward goals. Initiated aquatic thearpy for LLE and core strengthening, decreased lumbar back/L hip pain and increase lumbar mobility, and strengthen core mucles. Educated on postural awareness with emphasis on core stability, benefits of aquatic therapy and working in pain free ranges. Denies any increased pain symptoms, fearful of Deep water, able to hang in 5ft section only. [] No change. [] Other:    [x] Patient would continue to benefit from skilled physical therapy services in order to: help centralize sx, improve pain free lumbar AROM, and maximize neuromuscular strength & stability necessary for unrestricted ADL/work tolerances at her prior level of function    10/1: Continued to work on core and hip strengthening per chart. Jamaaln reported no change in pain with nerve glide but did note improved hamstring flexibility with exercise. Good tolerance to exercises performed today with no change in pain/soreness at end of session. STG: (to be met in 12 treatments)  1. Pt will self report worst pain no greater than 3/10 in order to better tolerate ADLs/work activities with minimal dysfunction GOAL PROGRESSING AND EXTENDED 9/22  2. Pt will improve lumbar AROM to 90% or greater in all planes in order to demonstrate ability to move/reach in all planes unrestricted at [facility] [address] 9/22  LTG: (to be met in 16 treatments)  1. Pt will demonstrate improved B LE strength to 4/5 or greater in order to demonstrate improved stability/strength necessary for unrestricted ADLs/work activities GOAL PROGRESSING 9/22  2.  Pt will self report ability to complete all work duties with pain no greater than 1/10 to demonstrate unrestricted functional tolerances at prior level  3. Pt will improve core strength Sahrmann Grade 3/5 or greater to demonstrate better support and stability to lumbar spine GOAL PROGRESSING 9/22  4. Pt will decrease SHEY to 10% impaired or less in order to demonstrate improved functional tolerances at PLOF with minimal restriction/dysfunction  5. Pt will demonstrate independence with a long term HEP for continued progress/maintenance after completion of PT    Pt. Education:  [x] Yes  [] No  [] Reviewed Prior HEP/Ed  Method of Education: [x] Verbal  [x] Demo  [] Written  Comprehension of Education:  [x] Verbalizes understanding. [x] Demonstrates understanding. [] Needs review. [] Demonstrates/verbalizes HEP/Ed previously given. Plan: [x] Continue per plan of care.    [] Other:           Treatment Charges: Mins Units   []  Modalities     []  Ther Exercise     []  Manual Therapy     []  Ther Activities     []  Aquatics 30 2   []  Neuromuscular     [] Vasocompression     [] Gait Training     [] Dry needling        [] 1 or 2 muscles        [] 3 or more muscles     []  Other     Total Treatment time 30 2     Time In: 0424JQ       Time Out: 0905am    Electronically signed by:  Nicole Flower PTA

## 2021-10-04 ENCOUNTER — HOSPITAL ENCOUNTER (OUTPATIENT)
Dept: PHYSICAL THERAPY | Age: 61
Setting detail: THERAPIES SERIES
Discharge: HOME | End: 2021-10-04
Payer: COMMERCIAL

## 2021-10-04 RX ORDER — SULFAMETHOXAZOLE AND TRIMETHOPRIM 800; 160 MG/1; MG/1
1 TABLET ORAL 2 TIMES DAILY
Qty: 14 TABLET | Refills: 0 | Status: SHIPPED | OUTPATIENT
Start: 2021-10-04 | End: 2021-10-11

## 2021-10-04 NOTE — FLOWSHEET NOTE
[] [facility]) - [facility] & Therapy  [address]  [place]: [phone]   F: [phone]     Physical Therapy Cancel/No Show note    Date: 10/4/2021  Patient: Gayle Gonsalez  : 1960  MRN: [id number]    Visit Count:   Cancels/No Shows to date:     For today's appointment patient:    [x]  Cancelled    [] Rescheduled appointment    [] No-show     Reason given by patient:    [x]  Patient ill    []  Conflicting appointment    [] No transportation      [] Conflict with work    [] No reason given    [] Weather related    [] COVID-19    [] Other:      Comments:        [x] Next appointment was confirmed    Electronically signed by: Osvaldo Bundy PT

## 2021-10-05 ENCOUNTER — HOSPITAL ENCOUNTER (OUTPATIENT)
Dept: MRI IMAGING | Facility: CLINIC | Age: 61
Discharge: HOME | End: 2021-10-07
Payer: COMMERCIAL

## 2021-10-05 DIAGNOSIS — M54.50 LOW BACK PAIN, UNSPECIFIED BACK PAIN LATERALITY, UNSPECIFIED CHRONICITY, UNSPECIFIED WHETHER SCIATICA PRESENT: ICD-10-CM

## 2021-10-05 DIAGNOSIS — M54.16 LUMBAR RADICULAR PAIN: ICD-10-CM

## 2021-10-05 PROCEDURE — 72148 MRI LUMBAR SPINE W/O DYE: CPT

## 2021-10-06 ENCOUNTER — APPOINTMENT (OUTPATIENT)
Dept: PHYSICAL THERAPY | Age: 61
End: 2021-10-06
Payer: COMMERCIAL

## 2021-10-07 ENCOUNTER — HOSPITAL ENCOUNTER (OUTPATIENT)
Age: 61
Setting detail: SPECIMEN
Discharge: HOME | End: 2021-10-07
Payer: COMMERCIAL

## 2021-10-07 ENCOUNTER — OFFICE VISIT (OUTPATIENT)
Dept: FAMILY MEDICINE CLINIC | Age: 61
End: 2021-10-07
Payer: COMMERCIAL

## 2021-10-07 VITALS
SYSTOLIC BLOOD PRESSURE: 126 MMHG | DIASTOLIC BLOOD PRESSURE: 76 MMHG | BODY MASS INDEX: 34.41 KG/M2 | TEMPERATURE: 98.1 F | HEIGHT: 63 IN | HEART RATE: 83 BPM | WEIGHT: 194.2 LBS

## 2021-10-07 DIAGNOSIS — E11.9 TYPE 2 DIABETES MELLITUS WITHOUT COMPLICATION, WITHOUT LONG-TERM CURRENT USE OF INSULIN (HCC): ICD-10-CM

## 2021-10-07 DIAGNOSIS — I10 ESSENTIAL HYPERTENSION: ICD-10-CM

## 2021-10-07 DIAGNOSIS — N30.00 ACUTE CYSTITIS WITHOUT HEMATURIA: ICD-10-CM

## 2021-10-07 DIAGNOSIS — M43.07 LUMBOSACRAL SPONDYLOLYSIS: ICD-10-CM

## 2021-10-07 DIAGNOSIS — N30.00 ACUTE CYSTITIS WITHOUT HEMATURIA: Primary | ICD-10-CM

## 2021-10-07 PROCEDURE — 81003 URINALYSIS AUTO W/O SCOPE: CPT | Performed by: STUDENT IN AN ORGANIZED HEALTH CARE EDUCATION/TRAINING PROGRAM

## 2021-10-07 PROCEDURE — 99213 OFFICE O/P EST LOW 20 MIN: CPT | Performed by: STUDENT IN AN ORGANIZED HEALTH CARE EDUCATION/TRAINING PROGRAM

## 2021-10-07 PROCEDURE — 3051F HG A1C>EQUAL 7.0%<8.0%: CPT | Performed by: STUDENT IN AN ORGANIZED HEALTH CARE EDUCATION/TRAINING PROGRAM

## 2021-10-07 ASSESSMENT — ENCOUNTER SYMPTOMS
VOMITING: 0
COUGH: 0
SHORTNESS OF BREATH: 0
CHEST TIGHTNESS: 0
COLOR CHANGE: 0
BACK PAIN: 0
ABDOMINAL PAIN: 1
DIARRHEA: 0
NAUSEA: 0
CONSTIPATION: 0

## 2021-10-08 LAB
BILIRUBIN, POC: NEGATIVE
BLOOD URINE, POC: NEGATIVE
CLARITY, POC: CLEAR
COLOR, POC: NORMAL
CULTURE: NORMAL
GLUCOSE URINE, POC: 500
KETONES, POC: NEGATIVE
LEUKOCYTE EST, POC: NEGATIVE
Lab: NORMAL
NITRITE, POC: NEGATIVE
PH, POC: 5.5
PROTEIN, POC: NEGATIVE
SPECIFIC GRAVITY, POC: 1.02
SPECIMEN DESCRIPTION: NORMAL
UROBILINOGEN, POC: 0.2

## 2021-10-11 ENCOUNTER — HOSPITAL ENCOUNTER (OUTPATIENT)
Age: 61
Setting detail: SPECIMEN
Discharge: HOME | End: 2021-10-11
Payer: COMMERCIAL

## 2021-10-11 ENCOUNTER — NURSE ONLY (OUTPATIENT)
Dept: FAMILY MEDICINE CLINIC | Age: 61
End: 2021-10-11

## 2021-10-11 ENCOUNTER — HOSPITAL ENCOUNTER (OUTPATIENT)
Dept: PHYSICAL THERAPY | Age: 61
Setting detail: THERAPIES SERIES
Discharge: HOME | End: 2021-10-11
Payer: COMMERCIAL

## 2021-10-11 DIAGNOSIS — R51.9 ACUTE NONINTRACTABLE HEADACHE, UNSPECIFIED HEADACHE TYPE: Primary | ICD-10-CM

## 2021-10-11 DIAGNOSIS — R51.9 ACUTE NONINTRACTABLE HEADACHE, UNSPECIFIED HEADACHE TYPE: ICD-10-CM

## 2021-10-11 NOTE — FLOWSHEET NOTE
[] [facility]) - [facility] & Therapy  [address]  [place]: [phone]   F: [phone]     Physical Therapy Cancel/No Show note    Date: 10/11/2021  Patient: Drake Brambila  : 1960  MRN: [id number]    Visit Count:   Cancels/No Shows to date:     For today's appointment patient:    [x]  Cancelled    [] Rescheduled appointment    [] No-show     Reason given by patient:    [x]  Patient ill    []  Conflicting appointment    [] No transportation      [] Conflict with work    [] No reason given    [] Weather related    [] COVID-19    [x] Other:      Comments: Pt arrived for her scheduled appt but states that she feels very unwell and has been sick for the past week. States that she initially thought she had a UTI but now is experiencing more global sx including extreme fatigue, headache, nausea and body aches. States that she felt badly about cancelling and wanted to try to do therapy but does not think that she can today. Encouraged pt to follow up with PCP to determine need for additional diagnostics given prolonged cold/flu like sx.        [x] Next appointment was confirmed    Electronically signed by: Anjelica Blue PT

## 2021-10-12 LAB
SARS-COV-2: NORMAL
SARS-COV-2: NOT DETECTED
SOURCE: NORMAL

## 2021-10-13 ENCOUNTER — APPOINTMENT (OUTPATIENT)
Dept: PHYSICAL THERAPY | Age: 61
End: 2021-10-13
Payer: COMMERCIAL

## 2021-10-13 ENCOUNTER — HOSPITAL ENCOUNTER (OUTPATIENT)
Dept: PHYSICAL THERAPY | Age: 61
Setting detail: THERAPIES SERIES
Discharge: HOME | End: 2021-10-13
Payer: COMMERCIAL

## 2021-10-13 NOTE — FLOWSHEET NOTE
[x] [facility]) - [facility] & Therapy  [address]  [place]: [phone]   F: [phone]     Physical Therapy Cancel/No Show note    Date: 10/13/2021  Patient: Jackie Bravo  : 1960  MRN: [id number]    Visit Count:   Cancels/No Shows to date: 3/0    For today's appointment patient:    [x]  Cancelled    [] Rescheduled appointment    [] No-show     Reason given by patient:    [x]  Patient ill    []  Conflicting appointment    [] No transportation      [] Conflict with work    [] No reason given    [] Weather related    [] [id number]    [x] Other:      Comments:       [x] Next appointment was confirmed    Electronically signed by: Marjorie Nieto PTA

## 2021-10-14 ENCOUNTER — OFFICE VISIT (OUTPATIENT)
Dept: ORTHOPEDIC SURGERY | Age: 61
End: 2021-10-14
Payer: COMMERCIAL

## 2021-10-14 VITALS — BODY MASS INDEX: 35.7 KG/M2 | RESPIRATION RATE: 16 BRPM | WEIGHT: 194 LBS | HEIGHT: 62 IN

## 2021-10-14 DIAGNOSIS — M54.50 LOW BACK PAIN, UNSPECIFIED BACK PAIN LATERALITY, UNSPECIFIED CHRONICITY, UNSPECIFIED WHETHER SCIATICA PRESENT: ICD-10-CM

## 2021-10-14 DIAGNOSIS — M54.16 LUMBAR RADICULAR PAIN: Primary | ICD-10-CM

## 2021-10-14 PROCEDURE — 99213 OFFICE O/P EST LOW 20 MIN: CPT | Performed by: ORTHOPAEDIC SURGERY

## 2021-10-14 NOTE — PROGRESS NOTES
Patient ID: Arleen Mcmanus is a 62 y.o. female    Chief Compliant:  No chief complaint on file. Diagnostic imaging:  MRI lumbar spine reveals a very minor disc bulge L5-S1 but paracentral to the right greater than left with a little bit of lateral recess stenosis      Assessment and Plan:  1. Low back pain, unspecified back pain laterality, unspecified chronicity, unspecified whether sciatica present    2. Lumbar radicular pain      Left S1 radiculopathy patient does fortunately report improvement since she was last seen    Refer to pain management for lumbar epidural steroid injections    Follow up 12 weeks    HPI:  This is a 62 y.o. female who presents to the clinic today for lower back pain. Patient is here for MRI results. Patient with ongoing left sided lower back pain radiating to the left leg pain along the lateral aspect to the lateral toes. Patient reports her pain has moderately improved since the last visit. Review of Systems   All other systems reviewed and are negative. Past History:    Current Outpatient Medications:     tiZANidine (ZANAFLEX) 2 MG tablet, Take 1 tablet by mouth every 6 hours as needed (back pain), Disp: 30 tablet, Rfl: 0    rosuvastatin (CRESTOR) 20 MG tablet, TAKE ONE TABLET BY MOUTH NIGHTLY, Disp: 30 tablet, Rfl: 2    busPIRone (BUSPAR) 10 MG tablet, Take 1 tab twice daily as needed, Disp: 180 tablet, Rfl: 3    cyclobenzaprine (FLEXERIL) 10 MG tablet, Take 1 tablet by mouth 3 times daily as needed for Muscle spasms, Disp: 10 tablet, Rfl: 0    ketorolac (TORADOL) 10 MG tablet, Take 1 tablet by mouth every 6 hours as needed for Pain, Disp: 20 tablet, Rfl: 0    gabapentin (NEURONTIN) 300 MG capsule, Take 1 capsule by mouth 3 times daily for 7 days.  Intended supply: 30 days, Disp: 21 capsule, Rfl: 0    ASPIRIN ADULT LOW STRENGTH 81 MG EC tablet, TAKE 1 TABLET BY MOUTH ONE TIME A DAY, Disp: 30 tablet, Rfl: 2    omeprazole (PRILOSEC) 20 MG delayed release capsule, Take 1 capsule by mouth 2 times daily, Disp: 180 capsule, Rfl: 3    rOPINIRole (REQUIP) 2 MG tablet, TAKE 2 TABLETS BY MOUTH NIGHTLY, Disp: 180 tablet, Rfl: 3    vitamin D (ERGOCALCIFEROL) 1.25 MG (81828 UT) CAPS capsule, Take 1 capsule by mouth once a week, Disp: 24 capsule, Rfl: 0    lisinopril (PRINIVIL;ZESTRIL) 40 MG tablet, TAKE 1 TABLET BY MOUTH ONE TIME A DAY, Disp: 90 tablet, Rfl: 2    glipiZIDE (GLUCOTROL) 10 MG tablet, TAKE 1 TABLET BY MOUTH 2 TIMES A DAY BEFORE MEALS (REPLACES GLYBURIDE), Disp: 180 tablet, Rfl: 1    TRULICITY 1.5 FRAGOSO/3.5RO SOPN, INJECT THE CONTENTS OF ONE SYRINGE UNDER THE SKIN ONCE WEEKLY, Disp: 1 pen, Rfl: 2    albuterol sulfate HFA (PROVENTIL HFA) 108 (90 Base) MCG/ACT inhaler, Inhale 2 puffs into the lungs every 4 hours as needed for Wheezing, Disp: 2 Inhaler, Rfl: 5    chlorthalidone (HYGROTON) 25 MG tablet, TAKE 1 TABLET BY MOUTH ONE TIME A DAY, Disp: 90 tablet, Rfl: 1    SYNJARDY 5-1000 MG TABS, TAKE 1 TABLET BY MOUTH 2 TIMES A DAY, Disp: 180 tablet, Rfl: 2    blood glucose test strips (PRODIGY NO CODING BLOOD GLUC) strip, Use to test once daily and as needed as directed by provider, Disp: 100 each, Rfl: 3    diclofenac sodium (VOLTAREN) 1 % GEL, Apply 2 g topically 2 times daily (Patient taking differently: Apply 2 g topically 2 times daily Prn pain), Disp: 100 g, Rfl: 2    ondansetron (ZOFRAN) 4 MG tablet, Take 1 tablet by mouth 3 times daily as needed for Nausea or Vomiting, Disp: 30 tablet, Rfl: 0    acetaminophen (ACETAMINOPHEN EXTRA STRENGTH) 500 MG tablet, Take 1,000 mg by mouth daily as needed for Pain, Disp: , Rfl:     bimatoprost (LUMIGAN) 0.01 % SOLN ophthalmic drops, Place 1 drop into both eyes nightly, Disp: , Rfl:     PRODIGY LANCETS 28G MISC, 1 Bottle by Does not apply route three times daily, Disp: 100 each, Rfl: 3    Blood Glucose Monitoring Suppl (PRODIGY AUTOCODE BLOOD GLUCOSE) w/Device KIT, 1 Device by Does not apply route 3 times daily, Disp: 1 kit, Rfl: 0  Allergies   Allergen Reactions    Codeine Nausea And Vomiting     Social History     Socioeconomic History    Marital status:      Spouse name: Not on file    Number of children: Not on file    Years of education: Not on file    Highest education level: Not on file   Occupational History    Not on file   Tobacco Use    Smoking status: Never Smoker    Smokeless tobacco: Never Used   Vaping Use    Vaping Use: Never used   Substance and Sexual Activity    Alcohol use: Yes     Alcohol/week: 0.0 standard drinks     Comment: rarely/ 4 times a year    Drug use: No    Sexual activity: Yes   Other Topics Concern    Not on file   Social History Narrative    Not on file     Social Determinants of Health     Financial Resource Strain:     Difficulty of Paying Living Expenses:    Food Insecurity:     Worried About Running Out of Food in the Last Year:     [address] in the Last Year:    Transportation Needs:     Lack of Transportation (Medical):      Lack of Transportation (Non-Medical):    Physical Activity:     Days of Exercise per Week:     Minutes of Exercise per Session:    Stress:     Feeling of Stress :    Social Connections:     Frequency of Communication with Friends and Family:     Frequency of Social Gatherings with Friends and Family:     Attends Temple Services:     Active Member of Clubs or Organizations:     Attends Club or Organization Meetings:     Marital Status:    Intimate Partner Violence:     Fear of Current or Ex-Partner:     Emotionally Abused:     Physically Abused:     Sexually Abused:      Past Medical History:   Diagnosis Date    Cervical spondylosis 4/2009    c-4 c-5, c-5 c-6, c-6 c-7    Generalized anxiety disorder 12/8/2020    Hyperlipidemia     Hypertension     DIEGO (nonalcoholic steatohepatitis) 10/12/2014    Obesity     Osteoarthritis of left knee 8/19/2014    Restless legs syndrome     Type II or unspecified type diabetes mellitus without mention of complication, not stated as uncontrolled      Past Surgical History:   Procedure Laterality Date     SECTION      [facility]COPY  2012    [facility]    HYSTERECTOMY, TOTAL ABDOMINAL  2008    Polymenorrhagia    SHOULDER SURGERY Left 2021     SHOULDER MANIPULATION WITH PRE OP INTERSCALENE BLOCK     SHOULDER SURGERY Left 2021    SHOULDER MANIPULATION WITH PRE OP INTERSCALENE BLOCK and intraop injection performed by Andrei Henderson DO at [facility]  2012    [facility]     Family History   Problem Relation Age of Onset    Heart Attack Mother     Diabetes Mother     Diabetes Father     Heart Attack Father         Physical Exam:  Vitals signs and nursing note reviewed. Constitutional:       Appearance: well-developed. HENT:      Head: Normocephalic and atraumatic. Nose: Nose normal.   Eyes:      Conjunctiva/sclera: Conjunctivae normal.   Neck:      Musculoskeletal: Normal range of motion and neck supple. Pulmonary:      Effort: Pulmonary effort is normal. No respiratory distress. Musculoskeletal:      Comments: Normal gait     Skin:     General: Skin is warm and dry. Neurological:      Mental Status: Alert and oriented to person, place, and time. Sensory: No sensory deficit. Psychiatric:         Behavior: Behavior normal.         Thought Content: Thought content normal.        Provider Attestation:  Gordon Jordan personally performed the services described in this documentation. All medical record entries made by the scribe were at my direction and in my presence. I have reviewed the chart and discharge instructions and agree that the records reflect my personal performance and is accurate and complete. Fabio German MD 10/14/21     Scribe Attestation:  By signing my name below, Tricia Simpson, attest that this documentation has been prepared under the direction and in the presence of Dr. Makenna Walton. Electronically signed: Oseas Lau, 10/14/21     Please note that this chart was generated using voice recognition Dragon dictation software. Although every effort was made to ensure the accuracy of this automated transcription, some errors in transcription may have occurred.

## 2021-10-18 ENCOUNTER — HOSPITAL ENCOUNTER (OUTPATIENT)
Dept: PHYSICAL THERAPY | Age: 61
Setting detail: THERAPIES SERIES
Discharge: HOME | End: 2021-10-18
Payer: COMMERCIAL

## 2021-10-18 PROCEDURE — 97140 MANUAL THERAPY 1/> REGIONS: CPT

## 2021-10-18 PROCEDURE — 97110 THERAPEUTIC EXERCISES: CPT

## 2021-10-18 NOTE — FLOWSHEET NOTE
[facility]   [address] [facility] [address]   Phone: [phone]   Fax: [phone]    Physical Therapy Daily Treatment Note      Date:  10/18/2021  Patient Name:  Gayle Gonsalez    :  1960  MRN: [id number]  Physician: Koffi Warner MD     Insurance: Medical [place]. Visits BMN. Diagnosis: M54.17 (ICD-10-CM) - Lumbosacral radiculopathy    Onset Date: 2021   Next Dr. Bailey Bias: 21 with referring provider. Pending consult with pain mgmt. Visit# / total visits:    Cancels/No Shows: 3/0    Subjective:    Pain:  [x] Yes  [] No Location: L posterior hip Pain Rating: (0-10 scale) 3/10  Pain altered Tx:  [] No  [] Yes  Action:  Comments: 10/18: Pt states that she had to miss her past few appts secondary to coming down with a virus (non-COVID), but started feeling much better over the weekend. Followed up with Dr. Edi Kwon last week who states that she does have a couple small disc bulges in her lower back, but recommends continued conservative mgmt. Pt states that pain has significantly improved recently and enjoyed her recent aquatics visit. 3/10 discomfort currently in L posterior hip but numbness into lower leg still persists. Objective:  All below objective measures updated       Range of Motion  Left Range of Motion  Right Strength  Left Strength  Right   Lumbar Flexion 75%  L LE pain 75%       Lumbar Extension 90%  Discomfort/stiffness 90%  Discomfort/stiffness       Lumbar Rotation 90% 90%       Lumbar Side Bend 100%   100%  Tightness and discomfort in L hip       Hip Flexion     3+/5 4/5   Hip Abduction     4-/5 4-/5   Hip Adduction     4/5 4/5   Hip Extension     3+/5 4/5   Hip ER           Hip IR           Knee Flexion     4-/5 4/5   Knee Extension     3-/5  Pain with active knee ext in sitting 4/5   Dorsiflexion     4/5 4/5   Plantar flexion     4/5  4/5   Inversion           Eversion           *All LE MMT assessed grossly in modified (seated) positioning     Core Strength: Sahrmann Grade 2/5      Modalities:    Precautions: None  Exercises:  Exercise Reps/ Time Weight/ Level Completed  Today Comments   Manual therapy 10'  x MFR to L glute max/med, piriformis and L lumbar paraspinals    Long axis traction through L LE          Prone prop       Bridges with 3'' hold 15 reps x 2  x    LTRs 20 reps      Supine marching (2 up- 2 down) with TrA activation 10 reps x 2      Supine alt kick outs with TrA activation 10 reps x 2  x LE component only of dead bugs   Supine sciatic nerve glides (90-90) 10 reps x 2  x Towel behind L knee   Seated figure 4 30'' x 3  x    Sidelying clams 15 reps x 2   BLE   TrA Activation with heel hover 10 reps x 2             Child's pose 30'' x 3             Nustep 5' L5 x LEs only   Slow standing marches 30 reps x 2   2-3'' hold emphasizing upright posture and core stability   Forward step ups 20 reps 6''  L leading; brief SLS hold at top   Lateral step ups 20 reps 6''   L leading; brief SLS hold at top   Pallof press 15 reps R/L Green x    Patient education    Sitting posture and neutral lumbar spine at work station    Other:     Specific Instructions for next treatment: Manual therapy as needed to address soft tissue restrictions in hips and low back (L>R) and progressive emphasis on sx centralization, pain free mobility, and core/hip stabilization as tolerated. Aquatic therapy 1x/week for stabilization activities in gravity reduced environment. Assessment: [x] Progressing toward goals. [] No change. [] Other:    [x] Patient would continue to benefit from skilled physical therapy services in order to: help centralize sx, improve pain free lumbar AROM, and maximize neuromuscular strength & stability necessary for unrestricted ADL/work tolerances at her prior level of function    10/18: Improved sx noted compared to previous visits, with just mild to moderate discomfort at L posterior hip region only.  Continues to have residual numbness at L lower leg but otherwise states that radiating pain is much improved. Progressed core stabilization activities with good tolerance, fatigue in core and shoulders with pallof press addition. Overall good progress to date. STG: (to be met in 12 treatments)  1. Pt will self report worst pain no greater than 3/10 in order to better tolerate ADLs/work activities with minimal dysfunction GOAL PROGRESSING AND EXTENDED 9/22  2. Pt will improve lumbar AROM to 90% or greater in all planes in order to demonstrate ability to move/reach in all planes unrestricted at [facility] 91 9/22  LTG: (to be met in 16 treatments)  1. Pt will demonstrate improved B LE strength to 4/5 or greater in order to demonstrate improved stability/strength necessary for unrestricted ADLs/work activities GOAL PROGRESSING 9/22  2. Pt will self report ability to complete all work duties with pain no greater than 1/10 to demonstrate unrestricted functional tolerances at prior level  3. Pt will improve core strength Sahrmann Grade 3/5 or greater to demonstrate better support and stability to lumbar spine GOAL PROGRESSING 9/22  4. Pt will decrease SHEY to 10% impaired or less in order to demonstrate improved functional tolerances at PLOF with minimal restriction/dysfunction  5. Pt will demonstrate independence with a long term HEP for continued progress/maintenance after completion of PT    Pt. Education:  [x] Yes  [] No  [] Reviewed Prior HEP/Ed  Method of Education: [x] Verbal  [x] Demo  [] Written  Comprehension of Education:  [x] Verbalizes understanding. [x] Demonstrates understanding. [] Needs review. [] Demonstrates/verbalizes HEP/Ed previously given. Plan: [x] Continue per plan of care.    [] Other:           Treatment Charges: Mins Units   []  Modalities     [x]  Ther Exercise 30 2   [x]  Manual Therapy 10 1   []  Ther Activities     []  Aquatics     []  Neuromuscular     [] Vasocompression [] Gait Training     [] Dry needling        [] 1 or 2 muscles        [] 3 or more muscles     []  Other     Total Treatment time 40 3     Time In: 8:00am            Time Out: 8:40am    Electronically signed by:  Juan Diego Canales PT

## 2021-10-20 ENCOUNTER — HOSPITAL ENCOUNTER (OUTPATIENT)
Dept: PHYSICAL THERAPY | Age: 61
Setting detail: THERAPIES SERIES
Discharge: HOME | End: 2021-10-20
Payer: COMMERCIAL

## 2021-10-20 PROCEDURE — 97113 AQUATIC THERAPY/EXERCISES: CPT

## 2021-10-20 NOTE — FLOWSHEET NOTE
[address] [facility]   [address]   Phone: [phone]   Fax: [phone]    Physical Therapy Daily Treatment Note      Date:  10/20/2021  Patient Name:  Randell Kim    :  1960  MRN: [id number]  Physician: Mingo Clay MD     Insurance: Medical [place]. Visits BMN. Diagnosis: M54.17 (ICD-10-CM) - Lumbosacral radiculopathy    Onset Date: 2021   Next Dr. Rosemarie Hsu: 21 with Dr. Samia Tate  Visit# / total visits: 10/16   Cancels/No Shows: 0/0    Subjective:  Denies any issues after initial aquatic therapy visit. States felt better for the rest of the day. Continues with complaints of pain in posterior/lateral hip down lateral LE to mid calf. Pain:  [x] Yes  [] No Location: L posterior hip radiating down lateral thigh to lateral mid calf   Pain Rating: (0-10 scale) 4/10  Pain altered Tx:  [x] No  [] Yes  Action:  Comments:         [address] [facility] J Exercise Log  Aquatic, Hip & DLS Program- Phase 1    Date of Eval: 21                               Primary PT:PALLAVI Gomes  Diagnosis: Things to Focus On (goals):   Surgical Precautions:  Medical Precautions:  [] C-9 dates  [] Occ Med   [] Medicare     FEARFUL OF DEEP WATER      Date 10/1/21 10/20/21      Visit # 8/16 10/16      Walk F/L/R 2 laps @ bar 2 Laps @ Rail      Marching 10x2\"  10x      Squats 10x5\" 10x5\"      Step-Ups F/L        Step Down F/L        Heel-toe raises 10x 10x      SLR F/L/R 10x 10x      Hip/Knee Flex/Ext  10x      F/L Lunges        HS curls  10x 10x              Kickboard Ex.  Sm Sm      Iso Abd. 10x5\" 10x5\"      Push-pull 10x 10x      Paddling                UE Format:        Horiz Abd/Add        IR/ER (wipers)        Alt Flex/Ext        Alt Press Down        Abd/Add                Deep Water: 5FT  1 Noodle      Hang 5' 5'      Cycling        Jacks        X-Country                Balance        SLS                Stretches        Achllies Hamstring  2X20\"              Cool Down 2 laps @ bar  2 Laps @ Rail      Pain Rating 4 4          Specific Instructions for next treatment:  Assess tolerance to recent progression and add cycling in deep water, step ups and increased reps to patient's tolerance. Assessment: [x] Progressing toward goals. Added hip/knee flex/ext exercise for ROM and LE/core strengthening. Also added hamstring stretch today due to patient c/o tightness in hamstrings pulling on lumbar back. Good tolerance to both added exercises. Emphasis throughout on postural awareness, core stability and working in pain free ranges. Notes RLE more limited with forward SLR today. Ended with deep water hang for pain relief. [] No change. [] Other:    [x] Patient would continue to benefit from skilled physical therapy services in order to: help centralize sx, improve pain free lumbar AROM, and maximize neuromuscular strength & stability necessary for unrestricted ADL/work tolerances at her prior level of function       STG: (to be met in 12 treatments)  1. Pt will self report worst pain no greater than 3/10 in order to better tolerate ADLs/work activities with minimal dysfunction GOAL PROGRESSING AND EXTENDED 9/22  2. Pt will improve lumbar AROM to 90% or greater in all planes in order to demonstrate ability to move/reach in all planes unrestricted at [facility] 91 9/22  LTG: (to be met in 16 treatments)  1. Pt will demonstrate improved B LE strength to 4/5 or greater in order to demonstrate improved stability/strength necessary for unrestricted ADLs/work activities GOAL PROGRESSING 9/22  2. Pt will self report ability to complete all work duties with pain no greater than 1/10 to demonstrate unrestricted functional tolerances at prior level  3. Pt will improve core strength Sahrmann Grade 3/5 or greater to demonstrate better support and stability to lumbar spine GOAL PROGRESSING 9/22  4.  Pt will decrease SHEY to 10% impaired or less in order to demonstrate improved functional tolerances at PLOF with minimal restriction/dysfunction  5. Pt will demonstrate independence with a long term HEP for continued progress/maintenance after completion of PT    Pt. Education:  [x] Yes  [] No  [] Reviewed Prior HEP/Ed  Method of Education: [x] Verbal  [x] Demo  [] Written  Comprehension of Education:  [x] Verbalizes understanding. [x] Demonstrates understanding. [] Needs review. [] Demonstrates/verbalizes HEP/Ed previously given. Plan: [x] Continue per plan of care.    [] Other:           Treatment Charges: Mins Units   []  Modalities     []  Ther Exercise     []  Manual Therapy     []  Ther Activities     [x]  Aquatics 35 2   []  Neuromuscular     [] Vasocompression     [] Gait Training     [] Dry needling        [] 1 or 2 muscles        [] 3 or more muscles     []  Other     Total Treatment time 35 2     Time In: 5295     Time Out: 0840    Electronically signed by:  Tommas Spurling, PTA

## 2021-10-25 ENCOUNTER — HOSPITAL ENCOUNTER (OUTPATIENT)
Dept: PHYSICAL THERAPY | Age: 61
Setting detail: THERAPIES SERIES
Discharge: HOME | End: 2021-10-25
Payer: COMMERCIAL

## 2021-10-25 PROCEDURE — 97110 THERAPEUTIC EXERCISES: CPT

## 2021-10-25 PROCEDURE — 97140 MANUAL THERAPY 1/> REGIONS: CPT

## 2021-10-27 ENCOUNTER — HOSPITAL ENCOUNTER (OUTPATIENT)
Dept: PHYSICAL THERAPY | Age: 61
Setting detail: THERAPIES SERIES
Discharge: HOME | End: 2021-10-27
Payer: COMMERCIAL

## 2021-10-27 NOTE — FLOWSHEET NOTE
[x] [facility]) - [facility]  [address]  [place]: [phone]   F: [phone]     Physical Therapy Cancel/No Show note    Date: 10/27/2021  Patient: Nabor Galindo  : 1960  MRN: [id number]    Visit Count:   Cancels/No Shows to date:     For today's appointment patient:    [x]  Cancelled    [] Rescheduled appointment    [] No-show     Reason given by patient:    [x]  Patient ill    []  Conflicting appointment    [] No transportation      [] Conflict with work    [] No reason given    [] Weather related    [] [id number]    [x] Other:      Comments:  Head ache       [x] Next appointment was confirmed    Electronically signed by: Millie Pinon PTA

## 2021-11-01 ENCOUNTER — HOSPITAL ENCOUNTER (OUTPATIENT)
Dept: PHYSICAL THERAPY | Age: 61
Setting detail: THERAPIES SERIES
Discharge: HOME | End: 2021-11-01
Payer: COMMERCIAL

## 2021-11-01 PROCEDURE — 97110 THERAPEUTIC EXERCISES: CPT

## 2021-11-01 PROCEDURE — 97140 MANUAL THERAPY 1/> REGIONS: CPT

## 2021-11-01 RX ORDER — EMPAGLIFLOZIN AND METFORMIN HYDROCHLORIDE 5; 1000 MG/1; MG/1
TABLET ORAL
Qty: 180 TABLET | Refills: 2 | Status: SHIPPED | OUTPATIENT
Start: 2021-11-01 | End: 2022-08-30

## 2021-11-01 NOTE — FLOWSHEET NOTE
[address] [facility]   [address] [facility] [address]   Phone: [phone]   Fax: [phone]    Physical Therapy Daily Treatment Note      Date:  2021  Patient Name:  Shabbir Jha    :  1960  MRN: [id number]  Physician: Morris Ceja MD     Insurance: Medical [place]. Visits BMN. Diagnosis: M54.17 (ICD-10-CM) - Lumbosacral radiculopathy    Onset Date: 2021   Next Dr. Bender Dadds: 21 with referring provider. Consult with pain mgmt 11/10   Visit# / total visits:    Cancels/No Shows: 4/0    Subjective:    Pain:  [x] Yes  [] No Location: L posterior hip Pain Rating: (0-10 scale) 1/10  Pain altered Tx:  [] No  [] Yes  Action:  Comments: : Pt states that today is the best she's felt in a long time and feels almost back to her normal. Still feels very tight in the back of her L hip and still feeling numbness at the lateral aspect of her L LE but overall feels like she's doing much better. Objective:  All below objective measures updated       Range of Motion  Left Range of Motion  Right Strength  Left Strength  Right   Lumbar Flexion 75%  L LE pain 75%       Lumbar Extension 90%  Discomfort/stiffness 90%  Discomfort/stiffness       Lumbar Rotation 90% 90%       Lumbar Side Bend 100%   100%  Tightness and discomfort in L hip       Hip Flexion     3+/5 4/5   Hip Abduction     4-/5 4-/5   Hip Adduction     4/5 4/5   Hip Extension     3+/5 4/5   Hip ER           Hip IR           Knee Flexion     4-/5 4/5   Knee Extension     3-/5  Pain with active knee ext in sitting 4/5   Dorsiflexion     4/5 4/5   Plantar flexion     4/5  4/5   Inversion           Eversion           *All LE MMT assessed grossly in modified (seated) positioning     Core Strength: Sahrmann Grade 2/5      Modalities:    Precautions: None  Exercises:  Exercise Reps/ Time Weight/ Level Completed  Today Comments   Manual therapy 10'  x MFR to L glute max/med, piriformis and L lumbar paraspinals    Long axis traction through L LE          Prone prop       Bridges with alt knee extensions 8 reps x 2  x    LTRs 20 reps      Supine marching (2 up- 2 down) with TrA activation 10 reps x 2      Supine alt kick outs with TrA activation 10 reps x 2  x LE component only of dead bugs   Supine sciatic nerve glides (90-90) 10 reps x 2  x Towel behind L knee   Seated figure 4 30'' x 3      Sidelying clams 15 reps x 2   BLE   TrA Activation with heel hover 10 reps x 2             Child's pose 30'' x 3             Nustep 5' L5 x LEs only   Slow standing marches 30 reps x 2   2-3'' hold emphasizing upright posture and core stability   Forward step ups 20 reps 6''  L leading; brief SLS hold at top   Lateral step ups 20 reps 8''  x L leading; brief SLS hold at top   Pallof press 15 reps R/L Green     Pallof step outs 20 reps R/L Green x    SLS on airex 20'' x 3  x Left; light intermittent UE support PRN   Mini squats 10 reps x 2  x ~1/2 depth   Patient education    Sitting posture and neutral lumbar spine at work station    Other:     Specific Instructions for next treatment: Manual therapy as needed to address soft tissue restrictions in hips and low back (L>R) and progressive emphasis on sx centralization, pain free mobility, and core/hip stabilization as tolerated. Aquatic therapy 1x/week for stabilization activities in gravity reduced environment. Assessment: [x] Progressing toward goals. [] No change. [] Other:    [x] Patient would continue to benefit from skilled physical therapy services in order to: help centralize sx, improve pain free lumbar AROM, and maximize neuromuscular strength & stability necessary for unrestricted ADL/work tolerances at her prior level of function    11/1: Pt continues to demonstrate tightness at L posterior hip but overall sx have been improving each visit.  Reviewed supine core stabilization as pt admits to not doing these as much at home, and continued to progress standing exercises. Notable fatigue in quads and glutes by end of session but no increased pain noted in back. Overall very good progress towards all goals. STG: (to be met in 12 treatments)  1. Pt will self report worst pain no greater than 3/10 in order to better tolerate ADLs/work activities with minimal dysfunction GOAL PROGRESSING AND EXTENDED 9/22  2. Pt will improve lumbar AROM to 90% or greater in all planes in order to demonstrate ability to move/reach in all planes unrestricted at [facility] 91 9/22  LTG: (to be met in 16 treatments)  1. Pt will demonstrate improved B LE strength to 4/5 or greater in order to demonstrate improved stability/strength necessary for unrestricted ADLs/work activities GOAL PROGRESSING 9/22  2. Pt will self report ability to complete all work duties with pain no greater than 1/10 to demonstrate unrestricted functional tolerances at prior level  3. Pt will improve core strength Sahrmann Grade 3/5 or greater to demonstrate better support and stability to lumbar spine GOAL PROGRESSING 9/22  4. Pt will decrease SHEY to 10% impaired or less in order to demonstrate improved functional tolerances at PLOF with minimal restriction/dysfunction  5. Pt will demonstrate independence with a long term HEP for continued progress/maintenance after completion of PT    Pt. Education:  [x] Yes  [] No  [] Reviewed Prior HEP/Ed  Method of Education: [x] Verbal  [x] Demo  [] Written  Comprehension of Education:  [x] Verbalizes understanding. [x] Demonstrates understanding. [] Needs review. [] Demonstrates/verbalizes HEP/Ed previously given. Plan: [x] Continue per plan of care.    [] Other:           Treatment Charges: Mins Units   []  Modalities     [x]  Ther Exercise 32 2   [x]  Manual Therapy 10 1   []  Ther Activities     []  Aquatics     []  Neuromuscular     [] Vasocompression     [] Gait Training     [] Dry needling        [] 1 or 2 muscles        [] 3 or more muscles     []  Other     Total Treatment time 42 3     Time In: 3:45pm            Time Out: 4:27pm    Electronically signed by:  Codi Steward PT

## 2021-11-01 NOTE — TELEPHONE ENCOUNTER
Trang Request for pending medication. Last Visit Date: 10/7/21  Next Visit Date:  Future Appointments   Date Time Provider Nicola Gabrieli   11/1/2021  3:45 PM Zelda Diallote, PT [facility] [address] [facility]   11/3/2021  4:15 PM Elnoria Dys, PTA [facility]   11/8/2021  4:15 PM Zelda Polite, PT [facility]   11/10/2021  2:00 PM MD Emir Bass   11/10/2021  4:15 PM Elnoria Dys, PTA [facility]   1/6/2022  4:00 PM Giuliana Starks MD [facility] [address] Maintenance   Topic Date Due    Shingles Vaccine (1 of 2) Never done    Diabetic retinal exam  01/12/2017    DTaP/Tdap/Td vaccine (2 - Tdap) 12/15/2020    Diabetic foot exam  11/12/2021 (Originally 12/1/2017)    Diabetic microalbuminuria test  06/11/2022    Lipid screen  06/24/2022    Potassium monitoring  07/21/2022    Creatinine monitoring  07/21/2022    A1C test (Diabetic or Prediabetic)  09/14/2022    Colon cancer screen colonoscopy  09/20/2022    Breast cancer screen  11/14/2022    Pneumococcal 0-64 years Vaccine (2 of 2 - PPSV23) 03/10/2026    Flu vaccine  Completed    COVID-19 Vaccine  Completed    Hepatitis C screen  Completed    HIV screen  Completed    Hepatitis A vaccine  Aged Out    Hib vaccine  Aged Out    Meningococcal (ACWY) vaccine  Aged Out       Hemoglobin A1C (%)   Date Value   09/14/2021 7.7   06/24/2021 7.9 (H)   04/20/2021 7.3             ( goal A1C is < 7)   Microalb/Crt.  Ratio (mcg/mg creat)   Date Value   06/11/2021 CANNOT BE CALCULATED     LDL Cholesterol (mg/dL)   Date Value   06/24/2021 52       (goal LDL is <100)   AST (U/L)   Date Value   02/04/2021 18     ALT (U/L)   Date Value   02/04/2021 20     BUN (mg/dL)   Date Value   07/21/2021 30 (H)     BP Readings from Last 3 Encounters:   10/07/21 126/76   09/14/21 130/74   08/17/21 118/73          (goal 120/80)    All Future Testing planned in [facility]  Lab Frequency Next Occurrence   Basic Metabolic Panel Once 76/20/7090       Next Visit Date:  Future Appointments   Date Time Provider Nicola Gabrieli   11/1/2021  3:45 PM Emma Alter, PT [facility] [address]   11/3/2021  4:15 PM Clarine Haste, PTA [facility] [address]   11/8/2021  4:15 PM Emma Alter, PT [facility] [address]   11/10/2021  2:00 PM Elieser Prajapati MD [address]   11/10/2021  4:15 PM Ekaterinaine Haste, PTA [facility] [address]   1/6/2022  4:00 PM Lynnette Lucero MD [email or web address]         Patient Active Problem List:     Cervical spondylosis     Type II or unspecified type diabetes mellitus without mention of complication, not stated as uncontrolled     Hypertension     Abnormal auditory perception     Eustachian tube dysfunction     Chronic serous otitis media     Nasal polyps     Nasal congestion     Osteoarthritis of left knee     Osteoarthritis of right knee     DIEGO (nonalcoholic steatohepatitis)     Vitamin D deficiency     Iron deficiency anemia     Dyslipidemia     Diabetes mellitus type 2, uncontrolled (HCC)     Left hip pain     Trochanteric bursitis     Fatigue     Daytime somnolence     Snoring     Chronic left shoulder pain     Restless legs syndrome     Generalized anxiety disorder     Primary osteoarthritis, left shoulder     Tendinopathy of left shoulder     Adhesive capsulitis of left shoulder

## 2021-11-03 ENCOUNTER — HOSPITAL ENCOUNTER (OUTPATIENT)
Dept: PHYSICAL THERAPY | Age: 61
Setting detail: THERAPIES SERIES
Discharge: HOME | End: 2021-11-03
Payer: COMMERCIAL

## 2021-11-03 PROCEDURE — 97113 AQUATIC THERAPY/EXERCISES: CPT

## 2021-11-03 NOTE — FLOWSHEET NOTE
[facility] Out[facility] Physical Therapy   [address] [facility] [address]   Phone: [phone]   Fax: [phone]    Physical Therapy Daily Treatment Note      Date:  11/3/2021  Patient Name:  Angelica Young    :  1960  MRN: [id number]  Physician: Maureen Alfred MD     Insurance: Medical [place]. Visits BMN. Diagnosis: M54.17 (ICD-10-CM) - Lumbosacral radiculopathy    Onset Date: 2021   Next Dr. Rush Maple: 11/10/21 Dr Savannah Garces  Visit# / total visits:    Cancels/No Shows: 4/0    Subjective:  Feels aquatics is helping overall. Pain is less and more tolerable however symptoms remain down L LE. Noted some shoulder irritation after last land visit. Pain:  [x] Yes  [] No Location: Left lower back/buttock down L posterior/lateral thigh to calf; continues with numbness and tingling in entire L LE to toes   Pain Rating: (0-10 scale) 1-2/10  Pain altered Tx:  [x] No  [] Yes  Action:  Comments:       [address] [facility] Exercise Log  Aquatic, Hip & DLS Program- Phase 1    Date of Eval: 21                               Primary PT:PALLAVI Gomes  Diagnosis: Things to Focus On (goals):   Surgical Precautions:  Medical Precautions:  [] C-9 dates  [] Occ Med   [] Medicare     FEARFUL OF DEEP WATER      Date 10/1/21 10/20/21 11/3/21     Visit # 8/16 10/16 13/16     Walk F/L/R 2 laps @ bar 2 Laps @ Rail 2 Laps @ Rail     Marching 10x2\"  10x 2 Laps     Squats 10x5\" 10x5\" 10x5\"     Step-Ups F/L    Add    Step Down F/L        Heel-toe raises 10x 10x 10x     SLR F/L/R 10x 10x 10x     Hip/Knee Flex/Ext  10x 10x     F/L Lunges        HS curls  10x 10x 10x             Kickboard Ex.  Sm Sm Small     Iso Abd. 10x5\" 10x5\" 10x5\"     Push-pull 10x 10x 10x     Paddling                UE Format:    Add    Horiz Abd/Add        IR/ER (wipers)        Alt Flex/Ext        Alt Press Down        Abd/Add                Deep Water: 5FT  1 Noodle 1 Noodle     Hang 5' 5' 5'     Cycling   1'     Jacks 1'     X-Country                Balance        SLS                Stretches        Achllies        Hamstring  2X20\" 2x20\"             Cool Down 2 laps @ bar  2 Laps @ Rail 2 Laps @ Rail     Pain Rating 4 4 1-2         Specific Instructions for next treatment:  Progress with step ups and UE format to tolerance to challenge core stability      Assessment: [x] Progressing toward goals. Progressed with marching laps to challenge core stability performing dynamic mobility. Continued patient education on centralization of symptoms vs radicular symptoms. Progressed with deep water aerobic exercise to tolerance- patient slightly uncomfortable in deeper water but improving with confidence and notes less pain when completely unloaded. Requires extra time to complete program but offers fair+ effort; emphasis on technique and postural awareness throughout program.    [] No change. [] Other:    [x] Patient would continue to benefit from skilled physical therapy services in order to: help centralize sx, improve pain free lumbar AROM, and maximize neuromuscular strength & stability necessary for unrestricted ADL/work tolerances at her prior level of function       STG: (to be met in 12 treatments)  1. Pt will self report worst pain no greater than 3/10 in order to better tolerate ADLs/work activities with minimal dysfunction GOAL PROGRESSING AND EXTENDED 9/22  2. Pt will improve lumbar AROM to 90% or greater in all planes in order to demonstrate ability to move/reach in all planes unrestricted at [facility] 91 9/22  LTG: (to be met in 16 treatments)  1. Pt will demonstrate improved B LE strength to 4/5 or greater in order to demonstrate improved stability/strength necessary for unrestricted ADLs/work activities GOAL PROGRESSING 9/22  2. Pt will self report ability to complete all work duties with pain no greater than 1/10 to demonstrate unrestricted functional tolerances at prior level  3.  Pt will improve core strength Sahrmann Grade 3/5 or greater to demonstrate better support and stability to lumbar spine GOAL PROGRESSING 9/22  4. Pt will decrease SHEY to 10% impaired or less in order to demonstrate improved functional tolerances at PLOF with minimal restriction/dysfunction  5. Pt will demonstrate independence with a long term HEP for continued progress/maintenance after completion of PT    Pt. Education:  [x] Yes  [] No  [x] Reviewed Prior HEP/Ed  Method of Education: [x] Verbal  [x] Demo  [] Written  Comprehension of Education:  [x] Verbalizes understanding. [x] Demonstrates understanding. [] Needs review. [] Demonstrates/verbalizes HEP/Ed previously given. Plan: [x] Continue per plan of care.    [] Other:           Treatment Charges: Mins Units   []  Modalities     []  Ther Exercise     []  Manual Therapy     []  Ther Activities     [x]  Aquatics 34 2   []  Neuromuscular     [] Vasocompression     [] Gait Training     [] Dry needling        [] 1 or 2 muscles        [] 3 or more muscles     []  Other     Total Treatment time 34 2     Time In: 024 PM     Time Out: 450 PM    Electronically signed by:  Micky Alvarez PTA

## 2021-11-08 ENCOUNTER — HOSPITAL ENCOUNTER (OUTPATIENT)
Dept: PHYSICAL THERAPY | Age: 61
Setting detail: THERAPIES SERIES
Discharge: HOME | End: 2021-11-08
Payer: COMMERCIAL

## 2021-11-08 PROCEDURE — 97110 THERAPEUTIC EXERCISES: CPT

## 2021-11-08 ASSESSMENT — ENCOUNTER SYMPTOMS
BACK PAIN: 1
RESPIRATORY NEGATIVE: 1
GASTROINTESTINAL NEGATIVE: 1
EYES NEGATIVE: 1

## 2021-11-08 ASSESSMENT — PAIN DESCRIPTION - PROGRESSION: CLINICAL_PROGRESSION: GRADUALLY IMPROVING

## 2021-11-08 ASSESSMENT — PAIN DESCRIPTION - PAIN TYPE: TYPE: CHRONIC PAIN

## 2021-11-08 ASSESSMENT — PAIN DESCRIPTION - FREQUENCY: FREQUENCY: INTERMITTENT

## 2021-11-08 ASSESSMENT — PAIN DESCRIPTION - DESCRIPTORS: DESCRIPTORS: NUMBNESS;DULL

## 2021-11-08 ASSESSMENT — PAIN - FUNCTIONAL ASSESSMENT: PAIN_FUNCTIONAL_ASSESSMENT: PREVENTS OR INTERFERES SOME ACTIVE ACTIVITIES AND ADLS

## 2021-11-08 ASSESSMENT — PAIN DESCRIPTION - ONSET: ONSET: ON-GOING

## 2021-11-08 ASSESSMENT — PAIN SCALES - GENERAL: PAINLEVEL_OUTOF10: 1

## 2021-11-08 ASSESSMENT — PAIN DESCRIPTION - LOCATION: LOCATION: BACK

## 2021-11-08 ASSESSMENT — PAIN DESCRIPTION - ORIENTATION: ORIENTATION: LOWER;LEFT

## 2021-11-08 NOTE — PROGRESS NOTES
[facility]   [address]   Phone: [phone]   Fax: [phone]    Physical Therapy Daily Treatment Note      Date:  2021  Patient Name:  Heydi Ramirez    :  1960  MRN: [id number]  Physician: Rowdy Carmichael MD     Insurance: Medical [place]. Visits BMN. Diagnosis: M54.17 (ICD-10-CM) - Lumbosacral radiculopathy    Onset Date: 2021   Next Dr. Gutierrez Giacomo: 21 with referring provider. Consult with pain mgmt 11/10   Visit# / total visits:    Cancels/No Shows: 4/0    Subjective:    Pain:  [x] Yes  [] No Location: L posterior hip Pain Rating: (0-10 scale) 0/10  Pain altered Tx:  [] No  [] Yes  Action:  Comments: : Pt states that overall she feels like she's been doing very well with minimal recent pain or limitations. Continues to express mild discomfort at L posterior hip with extended periods of sitting but states that she's now able to self manage sx with her HEP. Only area that has not improved has been persistent numbness/paresthesias into L lower leg/foot but states that it is not significantly limiting any of her desired activities at this time. Follows up with pain mgmt on 11/10 and pt is scheduled for her final aquatic visit this coming Wednesday. Formal progress note performed today for reporting period - to determine additional skilled PT needs     SHEY score (): 26% impaired    Objective:  All below objective measures updated       Range of Motion  Left Range of Motion  Right Strength  Left Strength  Right   Lumbar Flexion 100% 100%       Lumbar Extension 100% 100%       Lumbar Rotation 100% 100%       Lumbar Side Bend 100%   100%         Hip Flexion     4/5 4/5   Hip Abduction     4-/5 4-/5   Hip Adduction     4+/5 4+/5   Hip Extension     4-/5 4/5   Hip ER           Hip IR           Knee Flexion     4/5 4/5   Knee Extension     4/5  No pain but inc tinging in foot 4/5   Dorsiflexion     4/5 4/5 Plantar flexion     4/5  4/5   Inversion           Eversion           *All LE MMT assessed grossly in modified (seated) positioning     Core Strength: Sahrmann Grade 3+/5      Modalities:    Precautions: None  Exercises:  Exercise Reps/ Time Weight/ Level Completed  Today Comments   Manual therapy 8'   MFR to L glute max/med, piriformis and L lumbar paraspinals    Long axis traction through L LE          Prone prop       Bridges with alt knee extensions 8 reps x 2      LTRs 20 reps      Supine marching (2 up- 2 down) with TrA activation 10 reps x 2      Supine alt kick outs with TrA activation 10 reps x 2   LE component only of dead bugs   Supine sciatic nerve glides (90-90) 10 reps x 2   Towel behind L knee   Seated figure 4 30'' x 3      Sidelying clams 15 reps x 2   BLE   TrA Activation with heel hover 10 reps x 2             Child's pose 30'' x 3             Nustep 5' L5  LEs only   Slow standing marches 30 reps x 2   2-3'' hold emphasizing upright posture and core stability   Forward step ups 20 reps 6''  L leading; brief SLS hold at top   Lateral step ups 20 reps 8''   L leading; brief SLS hold at top   Pallof press 15 reps R/L Green     Pallof step outs 20 reps R/L Green     SLS on airex 20'' x 3   Left; light intermittent UE support PRN   Mini squats 10 reps x 2   ~1/2 depth   Patient education    Sitting posture and neutral lumbar spine at work station           Re-evaluation of all subjective & objective measures + final HEP review and d/c planning 30'  x 11/8 by primary PT          Other:     Specific Instructions for next treatment: Review final aquatics program to prepare for transition to an independent fitness routine      Assessment: [x] Progressing toward goals. [] No change.      [x] Other: Plan to discharge after completion of final aquatics visit on 11/10/21    [] Patient would continue to benefit from skilled physical therapy services in order to: help centralize sx, improve pain free lumbar AROM, and maximize neuromuscular strength & stability necessary for unrestricted ADL/work tolerances at her prior level of function    11/8: Pt has been seen for 14 PT visits to date for her chronic back and L LE pain. Overall, pt demonstrates excellent progress since previous assessment with decreased pain levels and improved self-reported functional tolerances with all ADLs and work activities. Pt has been able to complete all usual activities without significant pain or limitation, and only c/o mild residual soreness at L posterior hip with extended periods of sitting which she is able to self manage with her HEP. Also demonstrates good improvement in all objective measure re-evaluation today, with lumbar AROM and strength now safe and WFL. At this time, pt is able to complete all ADLs and desired activities with minimal to no discomfort or limitations. Majority of all PT goals have been met and discussed transition to an independent land-based HEP at this time. Updated written home program instructions were provided with good understanding. 1 additional aquatics visit is scheduled for this Wednesday, 11/10, which pt would like to attend to review her final aquatics program. Expresses possible interest in joining the [facility] to continue to utilize aquatic facilities. Plan to formally d/c after completion of this last remaining aquatics visit. Anticipate good long term prognosis with continued maintenance of strength and mobility at home or in gym. STG: (to be met in 12 treatments)  1. Pt will self report worst pain no greater than 3/10 in order to better tolerate ADLs/work activities with minimal dysfunction GOAL MET 11/8  2. Pt will improve lumbar AROM to 90% or greater in all planes in order to demonstrate ability to move/reach in all planes unrestricted at PLOF GOAL MET 11/8  LTG: (to be met in 16 treatments)  1.  Pt will demonstrate improved B LE strength to 4/5 or greater in order to demonstrate improved stability/strength necessary for unrestricted ADLs/work activities GOAL 90% MET 11/8  2. Pt will self report ability to complete all work duties with pain no greater than 1/10 to demonstrate unrestricted functional tolerances at prior level GOAL ESSENTIALLY MET 11/8 (ONLY BRIEF DISCOMFORT WITH VERY EXTENDED SITTING)  3. Pt will improve core strength Sahrmann Grade 3/5 or greater to demonstrate better support and stability to lumbar spine GOAL MET 11/8  4. Pt will decrease SHEY to 10% impaired or less in order to demonstrate improved functional tolerances at PLOF with minimal restriction/dysfunction GOAL MOSTLY MET 11/8 AND CONTINUING TO PROGRESS W/HEP  5. Pt will demonstrate independence with a long term HEP for continued progress/maintenance after completion of PT GOAL PROGRESSING; TO BE FINALIZED AFTER COMPLETION OF LAST AQUATICS VISIT 11/10    Pt. Education:  [x] Yes  [] No  [] Reviewed Prior HEP/Ed  Method of Education: [x] Verbal  [x] Demo  [x] Written ([address] [place] [address] Access Code [email or web address])- updated 11/8  Comprehension of Education:  [x] Verbalizes understanding. [x] Demonstrates understanding. [] Needs review. [] Demonstrates/verbalizes HEP/Ed previously given. Plan: [] Continue per plan of care.    [x] Other: Plan to discharge to independent HEP after completion of final aquatics visit on 11/10          Treatment Charges: Mins Units   []  Modalities     [x]  Ther Exercise 30 2   []  Manual Therapy     []  Ther Activities     []  Aquatics     []  Neuromuscular     [] Vasocompression     [] Gait Training     [] Dry needling        [] 1 or 2 muscles        [] 3 or more muscles     []  Other     Total Treatment time 30 2     Time In: 4:15pm            Time Out: 4:45pm    Electronically signed by:  Laura Cavanaugh, PT

## 2021-11-08 NOTE — PROGRESS NOTES
[facility]  Patient Pain Assessment  Consultation - Dr. Jailene Garcia    Primary Care Physician: Bam Pierson MD    Chief complaint:   Chief Complaint   Patient presents with    Lower Back Pain   . Arcelia Salcedo is a 62 y.o. female evaluated on 11/10/2021. Patient is referred by Dr. Georgi Dance      Patient identification was verified at the start of the visit: Yes    Chief complaint: Arcelia Salcedo is 62 y.o.,  female, with  Lower Back Pain  . HISTORY OF PRESENT ILLNESS:  Arcelia Salcedo is 62 y.o. female with    M54.50 (ICD-10-CM) - Low back pain, unspecified back pain laterality, unspecified chronicity, unspecified whether sciatica present  M54.16 (ICD-10-CM) - Lumbar radicular pain  Patient is a 61-year-old female referred to the pain clinic in consultation for low back pain. Patient has her pain since June but it got worse in August and since then she tried physical therapy. She reports physical therapy helped her pain by at least 50% by more. Patient pain is mostly in the left lower back with pain radiating to the left lower extremity. She also has numbness involving the left leg and the fourth and the fifth toes on the left side. Presents for further evaluation of low back pain  Presently in physical therapy  Reports pain to be on the low back radiating down the left side. Feels the leg to be numb. Denies weakness  Aggravated by laying on left side. Prolonged sitting. Alleviated by walking  Completed physical therapy and has stretching exercises at home. Back Pain  This is a chronic problem. The current episode started more than 1 month ago (June 2021). The problem occurs intermittently. The problem has been waxing and waning since onset. The pain is present in the lumbar spine, gluteal and sacro-iliac. The quality of the pain is described as shooting and aching. The pain radiates to the left knee, left thigh and left foot. no    FOCUSED PAIN SCALE:  Highest: 5  Lowest: 0  Average: Range- varies  When and What was your last procedure:  na     Was your procedure effective: not applicable    ACTIVITY/SOCIAL/EMOTIONAL:  Sleep Pattern: 6 hours per night. nightime awakenings  Energy Level: varies  Currently attending Physical Therapy: Yes twice a week  Home Exercises: every 2 days/home exercises . Mobility: no current mobility problem   Do you use assistive devices? No  Have you fallen in the last 30 days? No  Currently seeing a Psychiatrist or Psychologist: No  Emotional Issues: anxiety/ nervousness,patient denies depression   Mood: unable to assess due to phone interview     ABERRANT BEHAVIORS SINCE LAST VISIT:  Have you ever been treated in another Pain Clinic yes. [facility] for neck pain  Refills for prescriptions appropriate: not applicable  Lost Rx/pills: not applicable  Taking more medication than prescribed: no  Are you receiving PAIN medications from other doctors: no  Last Urine/Serum Drug Screen: to be collected if ordered  Was Serum/UDS as anticipated?  not applicable  Brought pill bottles in:not applicable   Was Pill count appropriate? :not applicable   Are currently pregnant? not applicable  Recent ER visits: No  Have you had a COVID 19 Vaccine? Yes.    Total SOAP points: 0             Past Medical History      Diagnosis Date    Cervical spondylosis 2009    c-4 c-5, c-5 c-6, c-6 c-7    Generalized anxiety disorder 2020    Hyperlipidemia     Hypertension     DIEGO (nonalcoholic steatohepatitis) 10/12/2014    Obesity     Osteoarthritis of left knee 2014    Restless legs syndrome     Type II or unspecified type diabetes mellitus without mention of complication, not stated as uncontrolled        Surgical History  Past Surgical History:   Procedure Laterality Date     SECTION      COLONOSCOPY  2012     jay    HYSTERECTOMY, TOTAL ABDOMINAL  2008    Polymenorrhagia    SHOULDER SURGERY Left 02/17/2021     SHOULDER MANIPULATION WITH PRE OP INTERSCALENE BLOCK     SHOULDER SURGERY Left 2/17/2021    SHOULDER MANIPULATION WITH PRE OP INTERSCALENE BLOCK and intraop injection performed by Flavia Duran DO at [address] [place] [address]  07-    [facility]       Medications  Current Outpatient Medications   Medication Sig Dispense Refill    SYNJARDY 5-1000 MG TABS TAKE 1 TABLET BY MOUTH 2 TIMES A  tablet 2    tiZANidine (ZANAFLEX) 2 MG tablet Take 1 tablet by mouth every 6 hours as needed (back pain) 30 tablet 0    busPIRone (BUSPAR) 10 MG tablet Take 1 tab twice daily as needed 180 tablet 3    ASPIRIN ADULT LOW STRENGTH 81 MG EC tablet TAKE 1 TABLET BY MOUTH ONE TIME A DAY 30 tablet 2    omeprazole (PRILOSEC) 20 MG delayed release capsule Take 1 capsule by mouth 2 times daily 180 capsule 3    rOPINIRole (REQUIP) 2 MG tablet TAKE 2 TABLETS BY MOUTH NIGHTLY 180 tablet 3    vitamin D (ERGOCALCIFEROL) 1.25 MG (28030 UT) CAPS capsule Take 1 capsule by mouth once a week 24 capsule 0    lisinopril (PRINIVIL;ZESTRIL) 40 MG tablet TAKE 1 TABLET BY MOUTH ONE TIME A DAY 90 tablet 2    glipiZIDE (GLUCOTROL) 10 MG tablet TAKE 1 TABLET BY MOUTH 2 TIMES A DAY BEFORE MEALS (REPLACES GLYBURIDE) 180 tablet 1    TRULICITY 1.5 IV/9.9KV SOPN INJECT THE CONTENTS OF ONE SYRINGE UNDER THE SKIN ONCE WEEKLY 1 pen 2    albuterol sulfate HFA (PROVENTIL HFA) 108 (90 Base) MCG/ACT inhaler Inhale 2 puffs into the lungs every 4 hours as needed for Wheezing 2 Inhaler 5    chlorthalidone (HYGROTON) 25 MG tablet TAKE 1 TABLET BY MOUTH ONE TIME A DAY 90 tablet 1    diclofenac sodium (VOLTAREN) 1 % GEL Apply 2 g topically 2 times daily (Patient taking differently: Apply 2 g topically 2 times daily Prn pain) 100 g 2    acetaminophen (ACETAMINOPHEN EXTRA STRENGTH) 500 MG tablet Take 1,000 mg by mouth daily as needed for Pain      bimatoprost (LUMIGAN) 0.01 % SOLN ophthalmic drops Place 1 drop into both eyes nightly      rosuvastatin (CRESTOR) 20 MG tablet TAKE ONE TABLET BY MOUTH NIGHTLY 30 tablet 2    cyclobenzaprine (FLEXERIL) 10 MG tablet Take 1 tablet by mouth 3 times daily as needed for Muscle spasms 10 tablet 0    ketorolac (TORADOL) 10 MG tablet Take 1 tablet by mouth every 6 hours as needed for Pain 20 tablet 0    gabapentin (NEURONTIN) 300 MG capsule Take 1 capsule by mouth 3 times daily for 7 days. Intended supply: 30 days 21 capsule 0    blood glucose test strips (PRODIGY NO CODING BLOOD GLUC) strip Use to test once daily and as needed as directed by provider 100 each 3    ondansetron (ZOFRAN) 4 MG tablet Take 1 tablet by mouth 3 times daily as needed for Nausea or Vomiting 30 tablet 0    PRODIGY LANCETS 28G MISC 1 Bottle by Does not apply route three times daily 100 each 3    Blood Glucose Monitoring Suppl (PRODIGY AUTOCODE BLOOD GLUCOSE) w/Device KIT 1 Device by Does not apply route 3 times daily 1 kit 0     No current facility-administered medications for this encounter. Allergies  Codeine    Family History  family history includes Diabetes in her father and mother; Heart Attack in her father and mother. Social History  Social History     Socioeconomic History    Marital status:      Spouse name: None    Number of children: None    Years of education: None    Highest education level: None   Occupational History    None   Tobacco Use    Smoking status: Never Smoker    Smokeless tobacco: Never Used   Vaping Use    Vaping Use: Never used   Substance and Sexual Activity    Alcohol use:  Yes     Alcohol/week: 0.0 standard drinks     Comment: rarely/ 4 times a year    Drug use: No    Sexual activity: Yes   Other Topics Concern    None   Social History Narrative    None     Social Determinants of Health     Financial Resource Strain:     Difficulty of Paying Living Expenses: Not on file   Food Insecurity:     Worried About Running Out of Food in Appearance: Normal appearance. HENT:      Head: Normocephalic and atraumatic. Mouth/Throat:      Mouth: Mucous membranes are moist.   Eyes:      Extraocular Movements: Extraocular movements intact. Cardiovascular:      Rate and Rhythm: Normal rate and regular rhythm. Heart sounds: Normal heart sounds. Pulmonary:      Effort: Pulmonary effort is normal.      Breath sounds: Normal breath sounds. Abdominal:      Palpations: Abdomen is soft. Musculoskeletal:      Cervical back: Neck supple. Lumbar back: Positive left straight leg raise test. Negative right straight leg raise test.   Neurological:      Mental Status: She is alert. Right Ankle Exam   Right ankle exam is normal.      Left Ankle Exam   Left ankle exam is normal.      Right Knee Exam   Right knee exam is normal.      Left Knee Exam   Left knee exam is normal.      Right Hip Exam     Tests   MILES: negative      Left Hip Exam     Tenderness   The patient is experiencing tenderness in the anterior and posterior. Range of Motion   Left hip abduction: Minimally painful. Muscle Strength   The patient has normal left hip strength. Tests   MILES: positive      Back Exam     Tenderness   The patient is experiencing tenderness in the sacroiliac and lumbar. Tests   Straight leg raise right: negative  Straight leg raise left: positive    Reflexes   Patellar: 1/4  Biceps: 1/4    Other   Gait: antalgic   Scars: absent    Comments:  Clint's test is minimally positive on the left side. Palpation also revealed paraspinal tenderness over the facet joints bilaterally worse on the left side tenderness also present for the left SI joint. Nurses Notes and Vital Signs reviewed.     DATA  Labs:  Benzodiazepines   Date Value Ref Range Status   12/07/2011 NEGATIVE NEG Final     Comment:           (Positive cutoff 200 ng/mL)              Imaging:  Radiology Images and Reports reviewed where indicated and necessary    MRI OF THE LUMBAR SPINE WITHOUT CONTRAST, 10/5/2021 4:44 pm       TECHNIQUE:   Multiplanar multisequence MRI of the lumbar spine was performed without the   administration of intravenous contrast.       COMPARISON:   CT lumbar spine 08/13/2021       HISTORY:   ORDERING SYSTEM PROVIDED HISTORY: Low back pain, unspecified back pain   laterality, unspecified chronicity, unspecified whether sciatica present   TECHNOLOGIST PROVIDED HISTORY:   Reason for Exam: Pt states low back pain left side into left hip pt states   neuropathy down left leg into toes x 5 mths neuropathy x 2 1/2 mths   Acuity: Chronic   Type of Exam: Subsequent/Follow-up   Additional signs and symptoms: low left side back and hip pain radiates into   left leg       FINDINGS:   BONES/ALIGNMENT:Loss of the normal lordotic curvature of the lumbar spine. The vertebral body heights are maintained. The bone marrow signal appears   unremarkable. SPINAL CORD:  The conus terminates normally. SOFT TISSUES: No paraspinal mass identified. L1-L2: There is no significant disc protrusion, spinal canal stenosis or   neural foraminal narrowing. L2-L3: There is no significant disc protrusion, spinal canal stenosis or   neural foraminal narrowing. L3-L4: Disc bulge along with facet arthropathy resulting in mild canal   stenosis. There is no significant foraminal stenosis. L4-L5: Disc bulge along with facet arthropathy resulting in mild canal   stenosis. There is no significant foraminal stenosis. L5-S1: Mild diffuse disc bulge with superimposed, broad-based left   subarticular disc protrusion which abuts the transversing left S1 nerve root. There is facet arthropathy. There is moderate right and mild left foraminal   stenosis. Impression   Broad-based left subarticular disc protrusion at L5-S1 abutting the   transversing left S1 nerve root.        Otherwise, multilevel lower lumbar spondylosis without significant canal stenosis. L4-L5    L 5S1:              Patient Active Problem List   Diagnosis    Cervical spondylosis    Type II or unspecified type diabetes mellitus without mention of complication, not stated as uncontrolled    Hypertension    Abnormal auditory perception    Eustachian tube dysfunction    Chronic serous otitis media    Nasal polyps    Nasal congestion    Osteoarthritis of left knee    Osteoarthritis of right knee    DIEGO (nonalcoholic steatohepatitis)    Vitamin D deficiency    Iron deficiency anemia    Dyslipidemia    Diabetes mellitus type 2, uncontrolled (HCC)    Left hip pain    Trochanteric bursitis    Fatigue    Daytime somnolence    Snoring    Chronic left shoulder pain    Restless legs syndrome    Generalized anxiety disorder    Primary osteoarthritis, left shoulder    Tendinopathy of left shoulder    Adhesive capsulitis of left shoulder        ASSESSMENT    Nabor Galindo is a 62 y.o. female with     1. Lumbar radiculopathy    2. Lumbar degenerative disc disease    3. Lumbar spondylosis    4. Sacroiliitis ([address]    5. Primary osteoarthritis of left hip           PLAN  Patient's   [x] x-ray    [x] CT scan    [x] MRI  Were/was  Reviewed. These findings are consistent with the patient's symptoms and physical examination. [x] Patient's findings on the x-ray were explained to the patient using a bone modal.    Other reports reviewed include    [] Bone scan   [] EMG and nerve conduction studies   [x] Referral reports-  I also discussed with him the following treatment options Including advantages and disadvantages of each:    [x] Physical therapy    [x] Interventional pain treatment    [] Medication management    [] Surgical options    Patient's OARRS were reviewed. It is acceptable and appears patient is not receiving prescriptions from multiple prescribers.   Patient is  forthcoming regarding prescriptions for pain medication in the past  Controlled Substances Monitoring: The following screens were also reviewed  SOAPP- the score is 0. (Values:cates patient is  <4minimal potential  4-7 Moderate potential  >7 High potential  for drug addiction  Counselling/Preventive measures for pain  Control:    [x]  Spine strengthening exercises are discussed with patient in detail. Patient is counseled on importance of exercise and,core strengthening. Some  specific exercises to strengthen the abdominal muscles and low back muscles Were discussed. Also aquatic (water) physical therapy and benefits were explained to patient. including \" Water supports the body and minimizes the effect of gravity, making it easier for patients to start an exercise program.\"   The following important principles were discussed with patient:  1. Limit Bed Rest--Studies show that people with short-term low-back pain who rest feel more pain and have a harder time with daily tasks than those who stay active. 2. Keep Exercising-patient is advised to stay away from strenuous activities like gardening and avoid whatever motion caused the pain in the first place.   3. Maintain Good Posture-Exercises  to maintain good posture were shown to patient. 4. To do exercises learned in PT regularly. [x]Information (handout) on exercise was  given to patient. Patient is counseled on importance of diet,exercise  and weight loss. I discussed with patient  Ill health effects of excessive weight on spine and weight bearing joints. Patient is encouraged to join a weight loss program. Importance of  promote lifestyle changes and diet modification including  eating smaller meals, cutting down on certain types of food, and making a conscious effort to exercise more were discussed with patient. Patient is encouraged to folow up with primary care physician for further management.    The following treatment plan was developed after discussion with patient:    We discussed Lumbar Epidural steroid Injections x 1  at L5-S1    Patient tried and failed NSAIDS,Home exercises, Physical Therapy, Chiropractic manipulations without relief. Patient exhibited signs of radiculopathy with positive straight leg raising test on left    Patient has not had prior Lumbar Surgery. We will see the patient in 2 weeks after the procedures and re-evaluate symptoms. Orders Placed This Encounter   Procedures    Lumbar Epidural Steroid Injection/Caudal     Standing Status:   Future     Standing Expiration Date:   11/10/2022    FLUORO FOR SURGICAL PROCEDURES     Standing Status:   Future     Standing Expiration Date:   11/10/2022    Saline lock IV     Standing Status:   Future     Standing Expiration Date:   5/10/2023       Decision Making Process : Patient's health history and referral records thoroughly reviewed before focused physical examination and discussion with patient. Over 50% of today's visit is spent on examining the patient and counseling. Level of complexity of date to be reviewed is Moderate. The chart date reviewed include the following: Imaging Reports. Summary of Care. Time spent reviewing with patient the below reports:   Medication safety, Treatment options. Level of diagnosis and management options of this case is multiple: involving the following management options: Interventions as needed, medication management as appropriate, future visits, activity modification, heat/ice as needed, Urine drug screen as required. [x]The patient's questions were answered to the best of my abilities. Electronically signed by Katherine Tyson MD on 11/10/2021 at 3:29 PM       NAME:  Jacque Borja  MRN: [id number]   YOB: 1960   Date: 11/10/2021   Age: 62 y.o.   Gender: female       Jacque Borja is 62 y.o. ,female, referred because of Lower Back Pain        I Performed a history and physical examination of the patient and discussed management with the resident/ Physician Assistant/ Nurse Practitioner. I reviewed the Resident/ Physician Assistant/ Nurse Practitioner note and agree with the documented findings and plan of care. Any areas of disagreement are noted on the chart. I was present for any key portions of any procedure. I have documented in the chart those procedures where I was not present during key Portions. I agree with the chief complaint, past medical history, past surgical history, Social & family history, Allergies, medications as documented unless noted below. I have personally evaluated this patient and have completed at least one if not all key elements of the (History, physical exam and plan of care). Additional findings are added to the note above    Diagnosis:   1. Lumbar radiculopathy    2. Lumbar degenerative disc disease    3. Lumbar spondylosis    4. Sacroiliitis ([address]    5. Primary osteoarthritis of left hip        Plan of Care:   As indicated above  Documentation:  I communicated with the patient and/or health care decision maker about plan of care  Details of this discussion including any medical advice provided: Total Time: 55 minutes  . This note was created using voice recognition software. There may be inaccuracies of transcription  that are inadvertently overlooked prior to the signature. There is any questions about the transcription please contact me.   Electronically signed by Stephanie Fregoso MD on 11/10/2021 at 9:46 PM

## 2021-11-10 ENCOUNTER — HOSPITAL ENCOUNTER (OUTPATIENT)
Dept: PHYSICAL THERAPY | Age: 61
Setting detail: THERAPIES SERIES
Discharge: HOME | End: 2021-11-10
Payer: COMMERCIAL

## 2021-11-10 ENCOUNTER — HOSPITAL ENCOUNTER (OUTPATIENT)
Dept: PAIN MANAGEMENT | Age: 61
Discharge: HOME | End: 2021-11-10
Payer: COMMERCIAL

## 2021-11-10 VITALS
HEART RATE: 91 BPM | WEIGHT: 194 LBS | SYSTOLIC BLOOD PRESSURE: 111 MMHG | OXYGEN SATURATION: 96 % | TEMPERATURE: 97.8 F | RESPIRATION RATE: 16 BRPM | BODY MASS INDEX: 34.38 KG/M2 | HEIGHT: 63 IN | DIASTOLIC BLOOD PRESSURE: 77 MMHG

## 2021-11-10 DIAGNOSIS — M46.1 SACROILIITIS (HCC): ICD-10-CM

## 2021-11-10 DIAGNOSIS — M51.36 LUMBAR DEGENERATIVE DISC DISEASE: ICD-10-CM

## 2021-11-10 DIAGNOSIS — M16.12 PRIMARY OSTEOARTHRITIS OF LEFT HIP: ICD-10-CM

## 2021-11-10 DIAGNOSIS — M47.816 LUMBAR SPONDYLOSIS: ICD-10-CM

## 2021-11-10 DIAGNOSIS — M54.16 LUMBAR RADICULOPATHY: Primary | ICD-10-CM

## 2021-11-10 PROCEDURE — 99244 OFF/OP CNSLTJ NEW/EST MOD 40: CPT | Performed by: PAIN MEDICINE

## 2021-11-10 PROCEDURE — 99203 OFFICE O/P NEW LOW 30 MIN: CPT

## 2021-11-10 PROCEDURE — 97113 AQUATIC THERAPY/EXERCISES: CPT

## 2021-11-10 ASSESSMENT — PAIN SCALES - GENERAL: PAINLEVEL_OUTOF10: 1

## 2021-11-10 ASSESSMENT — PAIN DESCRIPTION - LOCATION: LOCATION: BACK

## 2021-11-10 ASSESSMENT — PAIN DESCRIPTION - PAIN TYPE: TYPE: CHRONIC PAIN

## 2021-11-10 ASSESSMENT — PAIN DESCRIPTION - PROGRESSION: CLINICAL_PROGRESSION: GRADUALLY WORSENING

## 2021-11-10 ASSESSMENT — PAIN DESCRIPTION - ONSET: ONSET: ON-GOING

## 2021-11-10 ASSESSMENT — PAIN DESCRIPTION - ORIENTATION: ORIENTATION: LOWER;LEFT

## 2021-11-10 ASSESSMENT — PAIN - FUNCTIONAL ASSESSMENT: PAIN_FUNCTIONAL_ASSESSMENT: PREVENTS OR INTERFERES SOME ACTIVE ACTIVITIES AND ADLS

## 2021-11-10 ASSESSMENT — PAIN DESCRIPTION - FREQUENCY: FREQUENCY: INTERMITTENT

## 2021-11-10 NOTE — DISCHARGE SUMMARY
[x] [facility]) @ [facility]  [address]  [place], [address] [facility]  Phone [phone]  Fax [phone]    Physical Therapy Discharge Note    Date: 11/10/2021      Patient: Coral Smalls  : 1960  MRN: [id number]    Physician: Gianna Platt MD          Diagnosis: M54.17 (ICD-10-CM) - Lumbosacral radiculopathy Onset Date: 2021    Rehab Diagnosis: Back/hip pain and weakness  ICD-10 Codes: M54.17, R53.1  Total visits attended:   Cancels/No shows: 4/0  Date of initial visit: 21                   [x] Patient recovered from conditions. Treatment goals were met. [] Patient received maximum benefit. No further therapy indicated at this time. [] Patient demonstrated improvement from condition with  ** Of  ** Short term goals met. []Patient demonstrated improvement from condition with **   Of **  Long term goals met. [x] Patient to continue exercise/home instructions independently. [] Therapy interrupted due to:    [] Patient has 2 or more no shows/cancels, is discontinued per our policy. [] Patient has completed prescribed number of treatment sessions. [] Other:      Pain level at evaluation was      8 /10 and at discharge was      0 /10    It Is My Understanding That The:  [] Patient returned to work. [] Patient demonstrated improved level of function. [x] Patient returned to previous functional level.   [] Patient's current functional status is unknown due to no-shows  [] Other:     Recommendations/Comments:             Treatment Included:     [x] Therapeutic Exercise   92969  [] Iontophoresis: 4 mg/mL Dexamethasone Sodium Phosphate  mAmin  50412   [] Therapeutic Activity  62744 [] Vasopneumatic cold with compression  92182    [] Gait Training   16829 [] Ultrasound   19627   [x] Neuromuscular Re-education  84646 [] Electrical Stimulation Unattended  96068   [x] Manual Therapy  78208 [] Electrical Stimulation Attended  98297   [x] Instruction in HEP  [] Lumbar/Cervical Traction  [id number]   [x] Aquatic Therapy   [id number] [] Cold/hotpack    [] Massage   41236      [] Dry Needling, 1 or 2 muscles  10258   [] Biofeedback, first 15 minutes   49947  [] Biofeedback, additional 15 minutes   23406 [] Dry Needling, 3 or more muscles  94120                If you have any questions or concerns regarding this patient's care, please contact us.    Thank you for your referral.      Electronically signed by: Adrian Escoto PT

## 2021-11-10 NOTE — FLOWSHEET NOTE
[facility] Outpatient Physical Therapy   [id number] [facility] [address]   Phone: [phone]   Fax: [phone]    Physical Therapy Daily Treatment Note- DISCHARGE      Date:  11/10/2021  Patient Name:  Nabor Galindo    :  1960  MRN: [id number]  Physician: Mikael Dey MD     Insurance: [employer]. Visits BMN. Diagnosis: M54.17 (ICD-10-CM) - Lumbosacral radiculopathy    Onset Date: 2021   Next Dr. Yaniv Guerrerots: 11/10/21 Dr Chauncey Luu  Visit# / total visits: 15/16   Cancels/No Shows: 4/0    Subjective:  Saw pain management today- a little sore from visit. Plans to look into using [employer] pool to continue aquatics. Scheduled to have injection 21  Pain:  [x] Yes  [] No Location: Left lower back/buttock down L posterior/lateral thigh to calf; continues with numbness and tingling in entire L LE to toes   Pain Rating: (0-10 scale) 1/10  Pain altered Tx:  [x] No  [] Yes  Action:  Comments:       [address] [facility] J Exercise Log  Aquatic, Hip & DLS Program- Phase 1    Date of Eval: 21                               Primary PT:PALLAVI Gomes  Diagnosis: Things to Focus On (goals):   Surgical Precautions:  Medical Precautions:  [] C-9 dates  [] Occ Med   [] Medicare     FEARFUL OF DEEP WATER      Date 10/1/21 10/20/21 11/3/21 11/10/21    Visit # 8/16 10/16 13/16 15/16    Walk F/L/R 2 laps @ bar 2 Laps @ Rail 2 Laps @ Rail 2 Laps @ [employer] 10x2\"  10x 2 Laps 2 Laps    Squats 10x5\" 10x5\" 10x5\" 10X5\"    Step-Ups F/L        Step Down F/L        Heel-toe raises 10x 10x 10x 10X    SLR F/L/R 10x 10x 10x 10X    Hip/Knee Flex/Ext  10x 10x 10X    F/L Lunges        HS curls  10x 10x 10x 10x            Kickboard Ex.  Sm Sm Small Small    Iso Abd. 10x5\" 10x5\" 10x5\" 10x5\"    Push-pull 10x 10x 10x 10x    Paddling                UE Format:        Horiz Abd/Add    10x    IR/ER (wipers)        Alt Flex/Ext    10x    Alt Press Down        Abd/Add    10x            Deep Water: 5FT  1 Noodle 1 Noodle 1 Noodle    Hang 5' 5' 5' 5'    Cycling   1' 1'    Jacks   1' 1'    X-Country                Balance        SLS                Stretches        Achllies        Hamstring  2X20\" 2x20\" 2x20\"            Cool Down 2 laps @ bar  2 Laps @ Rail 2 Laps @ Rail NT    Pain Rating 4 4 1-2 1        Specific Instructions for next treatment:  Progress with step ups and UE format to tolerance to challenge core stability      Assessment: [x] Progressing toward goals. Reviewed HEP and provided patient with written HEP to continue aquatics Indep. [] No change. [] Other:    [x] Patient would continue to benefit from skilled physical therapy services in order to: help centralize sx, improve pain free lumbar AROM, and maximize neuromuscular strength & stability necessary for unrestricted ADL/work tolerances at her prior level of function         STG: (to be met in 12 treatments)  1. Pt will self report worst pain no greater than 3/10 in order to better tolerate ADLs/work activities with minimal dysfunction GOAL MET 11/8  2. Pt will improve lumbar AROM to 90% or greater in all planes in order to demonstrate ability to move/reach in all planes unrestricted at PLOF GOAL MET 11/8  LTG: (to be met in 16 treatments)  1. Pt will demonstrate improved B LE strength to 4/5 or greater in order to demonstrate improved stability/strength necessary for unrestricted ADLs/work activities GOAL 90% MET 11/8  2. Pt will self report ability to complete all work duties with pain no greater than 1/10 to demonstrate unrestricted functional tolerances at prior level GOAL ESSENTIALLY MET 11/8 (ONLY BRIEF DISCOMFORT WITH VERY EXTENDED SITTING)  3. Pt will improve core strength Sahrmann Grade 3/5 or greater to demonstrate better support and stability to lumbar spine GOAL MET 11/8  4.  Pt will decrease SHEY to 10% impaired or less in order to demonstrate improved functional tolerances at PLOF with minimal restriction/dysfunction GOAL MOSTLY MET 11/8 AND CONTINUING TO PROGRESS W/HEP  5. Pt will demonstrate independence with a long term HEP for continued progress/maintenance after completion of PT GOAL MET (LAND BASED HEP PROVIDED 11/8, AQUATIC BASED HEP PROVIDED BY PTA 11/10)    Pt. Education:  [x] Yes  [] No  [x] Reviewed Prior HEP/Ed  Method of Education: [x] Verbal  [x] Demo  [] Written  Comprehension of Education:  [x] Verbalizes understanding. [x] Demonstrates understanding. [] Needs review. [] Demonstrates/verbalizes HEP/Ed previously given. Plan: [] Continue per plan of care.    [x] Other: DISCHARGE        Treatment Charges: Mins Units   []  Modalities     []  Ther Exercise     []  Manual Therapy     []  Ther Activities     [x]  Aquatics 34 2   []  Neuromuscular     [] Vasocompression     [] Gait Training     [] Dry needling        [] 1 or 2 muscles        [] 3 or more muscles     []  Other     Total Treatment time 34 2     Time In: 385 PM     Time Out: 568 PM    Electronically signed by:  Lyudmila Feliciano PTA

## 2021-11-16 RX ORDER — CHLORTHALIDONE 25 MG/1
TABLET ORAL
Qty: 90 TABLET | Refills: 1 | Status: ON HOLD | OUTPATIENT
Start: 2021-11-16 | End: 2022-06-09 | Stop reason: HOSPADM

## 2021-11-16 NOTE — TELEPHONE ENCOUNTER
Hygroton pending for refill     ,  Health Maintenance   Topic Date Due    Shingles Vaccine (1 of 2) Never done    Diabetic retinal exam  01/12/2017    Diabetic foot exam  12/01/2017    DTaP/Tdap/Td vaccine (2 - Tdap) 12/15/2020    COVID-19 Vaccine (3 - Booster for Moderna series) 08/11/2021    Diabetic microalbuminuria test  06/11/2022    Lipid screen  06/24/2022    Potassium monitoring  07/21/2022    Creatinine monitoring  07/21/2022    A1C test (Diabetic or Prediabetic)  09/14/2022    Colon cancer screen colonoscopy  09/20/2022    Breast cancer screen  11/14/2022    Pneumococcal 0-64 years Vaccine (2 of 2 - PPSV23) 03/10/2026    Flu vaccine  Completed    Hepatitis C screen  Completed    HIV screen  Completed    Hepatitis A vaccine  Aged Out    Hib vaccine  Aged Out    Meningococcal (ACWY) vaccine  Aged Out             (applicable per patient's age: Cancer Screenings, Depression Screening, Fall Risk Screening, Immunizations)    Hemoglobin A1C (%)   Date Value   09/14/2021 7.7   06/24/2021 7.9 (H)   04/20/2021 7.3     Microalb/Crt.  Ratio (mcg/mg creat)   Date Value   06/11/2021 CANNOT BE CALCULATED     LDL Cholesterol (mg/dL)   Date Value   06/24/2021 52     AST (U/L)   Date Value   02/04/2021 18     ALT (U/L)   Date Value   02/04/2021 20     BUN (mg/dL)   Date Value   07/21/2021 30 (H)      (goal A1C is < 7)   (goal LDL is <100) need 30-50% reduction from baseline     BP Readings from Last 3 Encounters:   11/10/21 111/77   10/07/21 126/76   09/14/21 130/74    (goal /80)      All Future Testing planned in [facility]:  Lab Frequency Next Occurrence   Basic Metabolic Panel Once 94/00/8141   Saline lock IV Once 05/10/2022   FLUORO FOR SURGICAL PROCEDURES Once 05/10/2022   Lumbar Epidural Steroid Injection/Caudal Once 11/10/2021       Next Visit Date:  Future Appointments   Date Time Provider Nicola Zaidi   11/22/2021 10:20 AM Loida Prieto MD [address]   12/6/2021 2:40 PM Jamarcus PostORLY - CNP [facility] [id number]   1/6/2022  4:00 PM Oz Jackson MD [facility]            Patient Active Problem List:     Cervical spondylosis     Type II or unspecified type diabetes mellitus without mention of complication, not stated as uncontrolled     Hypertension     Abnormal auditory perception     Eustachian tube dysfunction     Chronic serous otitis media     Nasal polyps     Nasal congestion     Osteoarthritis of left knee     Osteoarthritis of right knee     DIEGO (nonalcoholic steatohepatitis)     Vitamin D deficiency     Iron deficiency anemia     Dyslipidemia     Diabetes mellitus type 2, uncontrolled (HCC)     Left hip pain     Trochanteric bursitis     Fatigue     Daytime somnolence     Snoring     Chronic left shoulder pain     Restless legs syndrome     Generalized anxiety disorder     Primary osteoarthritis, left shoulder     Tendinopathy of left shoulder     Adhesive capsulitis of left shoulder     Lumbar radiculopathy

## 2021-11-22 ENCOUNTER — HOSPITAL ENCOUNTER (OUTPATIENT)
Dept: GENERAL RADIOLOGY | Age: 61
Discharge: HOME | End: 2021-11-24
Payer: COMMERCIAL

## 2021-11-22 ENCOUNTER — HOSPITAL ENCOUNTER (OUTPATIENT)
Dept: PAIN MANAGEMENT | Age: 61
Discharge: HOME | End: 2021-11-22
Payer: COMMERCIAL

## 2021-11-22 VITALS
BODY MASS INDEX: 35.08 KG/M2 | WEIGHT: 198 LBS | HEART RATE: 75 BPM | HEIGHT: 63 IN | RESPIRATION RATE: 20 BRPM | DIASTOLIC BLOOD PRESSURE: 70 MMHG | OXYGEN SATURATION: 98 % | SYSTOLIC BLOOD PRESSURE: 115 MMHG

## 2021-11-22 DIAGNOSIS — M54.16 LUMBAR RADICULOPATHY: Primary | ICD-10-CM

## 2021-11-22 DIAGNOSIS — M51.36 LUMBAR DEGENERATIVE DISC DISEASE: ICD-10-CM

## 2021-11-22 DIAGNOSIS — M47.816 LUMBAR SPONDYLOSIS: ICD-10-CM

## 2021-11-22 DIAGNOSIS — M54.16 LUMBAR RADICULOPATHY: ICD-10-CM

## 2021-11-22 PROCEDURE — 62323 NJX INTERLAMINAR LMBR/SAC: CPT | Performed by: PAIN MEDICINE

## 2021-11-22 PROCEDURE — 3209999900 FLUORO FOR SURGICAL PROCEDURES

## 2021-11-22 PROCEDURE — 6360000004 HC RX CONTRAST MEDICATION: Performed by: PAIN MEDICINE

## 2021-11-22 PROCEDURE — 6360000002 HC RX W HCPCS: Performed by: PAIN MEDICINE

## 2021-11-22 PROCEDURE — 62323 NJX INTERLAMINAR LMBR/SAC: CPT

## 2021-11-22 RX ORDER — TRIAMCINOLONE ACETONIDE 40 MG/ML
INJECTION, SUSPENSION INTRA-ARTICULAR; INTRAMUSCULAR
Status: COMPLETED | OUTPATIENT
Start: 2021-11-22 | End: 2021-11-22

## 2021-11-22 RX ADMIN — IOHEXOL 2 ML: 180 INJECTION INTRAVENOUS at 11:09

## 2021-11-22 RX ADMIN — TRIAMCINOLONE ACETONIDE 80 MG: 40 INJECTION, SUSPENSION INTRA-ARTICULAR; INTRAMUSCULAR at 11:10

## 2021-11-22 NOTE — PROGRESS NOTES
Discharge criteria met. Post procedure dressing dry and intact. Sensory and motor function intact as per pre-procedure. Patient alert and oriented x3  Instructions and follow up reviewed with pt at patient at discharge. Discharged home with daughter driving.   Discharged @  [phone]

## 2021-11-22 NOTE — PROCEDURES
Pre-Procedure Note    Patient Name: Agnes Cuevas   YOB: 1960  Room/Bed: Room/bed info not found  Medical Record Number: [id number]  Date: 2021       Indication:    1. Lumbar radiculopathy    2. Lumbar degenerative disc disease    3. Lumbar spondylosis        Consent: On file. Vital Signs:   Vitals:    21 1108   BP: 130/81   Pulse: 85   Resp: 20   SpO2: 96%       Past Medical History:   has a past medical history of Cervical spondylosis, Generalized anxiety disorder, Hyperlipidemia, Hypertension, Lumbar radiculopathy, DIEGO (nonalcoholic steatohepatitis), Obesity, Osteoarthritis of left knee, Restless legs syndrome, and Type II or unspecified type diabetes mellitus without mention of complication, not stated as uncontrolled. Past Surgical History:   has a past surgical history that includes Upper gastrointestinal endoscopy (2012); Colonoscopy (2012);  section; Hysterectomy, total abdominal (); shoulder surgery (Left, 2021); and shoulder surgery (Left, 2021). Pre-Sedation Documentation and Exam:   Vital signs have been reviewed (see flow sheet for vitals). Mallampati Airway Assessment:  normal    ASA Classification:  Class 3 - A patient with severe systemic disease that limits activity but is not incapacitating    Sedation/ Anesthesia Plan:   intravenous sedation   as needed. Medications Planned:   midazolam (Versed) / Fentanyl  Intravenously  as needed. Patient is an appropriate candidate for plan of sedation: yes  Patient's History and Physical examination was reviewed and there is no change. Electronically signed by Marah Ashley MD on 2021 at 11:19 AM        Preoperative Diagnosis:    1. Lumbar radiculopathy    2. Lumbar degenerative disc disease    3. Lumbar spondylosis        Postoperative Diagnosis:    1. Lumbar radiculopathy    2. Lumbar degenerative disc disease    3.  Lumbar spondylosis        Procedure Performed:  Lumbar epidural steroid injection under fluoroscopy guidance without IV sedation. Indication for the Procedure: The patient failed conservative management  for pain in the low back radiating to lower extremities. As the patient is not responding to conservative management and it is interfering with activities of daily living we decided to proceed with lumbar epidural steroid injection. The procedure and risks were discussed with the patient and an informed consent was obtained  Current Pain Assessment        Procedure:       Patient's vital signs including BP, EKG and SaO2 were monitored by the RN and they remained stable during the procedure. A meaningful communication was kept up with the patient throughout  the procedure. The patient is placed in prone position. Skin over the back was prepped and draped in sterile manner. Then using fluoroscopy the L5, S1 interspace was observed and the skin and deep tissues in the left paramedian area were infiltrated with 4 ml of 1% lidocaine. The #20-gauge, 3-1/2 inch Tuohy needle was inserted through the skin wheal and the epidural space was identified using loss of resistance technique with normal saline. This was confirmed with AP and lateral views using fluoroscopy after injecting about 2 ml of Omnipaque-180 and observing the spread of the contrast in the epidural space. Then after negative aspiration a total of 80 mg of triamcinolone with 8 ml of normal saline was injected into the epidural space. The needle is removed and a Band-Aid was placed over the needle insertion site. Patient's vital signs remained stable and the patient tolerated the procedure well. The patient was discharged home in stable condition and will be followed in the pain clinic in the next few weeks for further planning.     Electronically signed by Arturo Vargas MD on 11/22/2021 at 11:19 AM

## 2021-12-06 ENCOUNTER — HOSPITAL ENCOUNTER (OUTPATIENT)
Age: 61
Setting detail: SPECIMEN
Discharge: HOME | End: 2021-12-06

## 2021-12-06 ENCOUNTER — HOSPITAL ENCOUNTER (OUTPATIENT)
Dept: PAIN MANAGEMENT | Age: 61
Discharge: HOME | End: 2021-12-06
Payer: COMMERCIAL

## 2021-12-06 ENCOUNTER — NURSE ONLY (OUTPATIENT)
Dept: FAMILY MEDICINE CLINIC | Age: 61
End: 2021-12-06

## 2021-12-06 ENCOUNTER — TELEPHONE (OUTPATIENT)
Dept: FAMILY MEDICINE CLINIC | Age: 61
End: 2021-12-06

## 2021-12-06 DIAGNOSIS — M54.16 LUMBAR RADICULOPATHY: Primary | ICD-10-CM

## 2021-12-06 DIAGNOSIS — U07.1 COVID: Primary | ICD-10-CM

## 2021-12-06 PROCEDURE — 99213 OFFICE O/P EST LOW 20 MIN: CPT | Performed by: NURSE PRACTITIONER

## 2021-12-06 PROCEDURE — 99213 OFFICE O/P EST LOW 20 MIN: CPT

## 2021-12-06 ASSESSMENT — ENCOUNTER SYMPTOMS
BACK PAIN: 1
SHORTNESS OF BREATH: 0
COUGH: 0
CONSTIPATION: 0

## 2021-12-06 NOTE — TELEPHONE ENCOUNTER
Patient is requesting a Covid-19 order put in the chart, due to patient taking at home tests and the test coming back positive.     Please advise

## 2021-12-06 NOTE — PROGRESS NOTES
Patient completed a telephone visit today for follow up after procedure    Chief Complaint: back pain    PMH Pt complains of back pain. MRI lumbar with Broad-based left subarticular disc protrusion at L5-S1 abutting the transversing left S1 nerve root. She has seen a surgeon -  Dr. Alex Mayfield - who recommended lumbar epidural steroid injections. She completed aquatic therapy last month with benefit. She had LESI 21 and reports 75-80% relief of back pain -  she does still have some numbness in left leg. She currently has Covid. Back Pain  This is a chronic problem. The current episode started more than 1 year ago. The problem has been gradually improving since onset. The pain is present in the lumbar spine. The pain is at a severity of 1/10. The pain is mild. The symptoms are aggravated by position. Associated symptoms include numbness. Pertinent negatives include no chest pain or fever. Treatments tried: LESI. The treatment provided significant relief. Patient denies any new neurological symptoms. No bowel or bladder incontinence, no weakness, and no falling.       Past Medical History:   Diagnosis Date    Cervical spondylosis 2009    c-4 c-5, c-5 c-6, c-6 c-7    Generalized anxiety disorder 2020    Hyperlipidemia     Hypertension     Lumbar radiculopathy     DIEGO (nonalcoholic steatohepatitis) 10/12/2014    Obesity     Osteoarthritis of left knee 2014    Restless legs syndrome     Type II or unspecified type diabetes mellitus without mention of complication, not stated as uncontrolled        Past Surgical History:   Procedure Laterality Date     SECTION      COLONOSCOPY  2012    [facility]    HYSTERECTOMY, TOTAL ABDOMINAL  2008    Polymenorrhagia    SHOULDER SURGERY Left 2021     SHOULDER MANIPULATION WITH PRE OP INTERSCALENE BLOCK     SHOULDER SURGERY Left 2021    SHOULDER MANIPULATION WITH PRE OP INTERSCALENE BLOCK and intraop injection performed by Prateek Robert DO at [address] [place] [address]  07-    [facility]       Allergies   Allergen Reactions    Codeine Nausea And Vomiting         Current Outpatient Medications:     chlorthalidone (HYGROTON) 25 MG tablet, TAKE 1 TABLET BY MOUTH ONE TIME A DAY, Disp: 90 tablet, Rfl: 1    SYNJARDY 5-1000 MG TABS, TAKE 1 TABLET BY MOUTH 2 TIMES A DAY, Disp: 180 tablet, Rfl: 2    tiZANidine (ZANAFLEX) 2 MG tablet, Take 1 tablet by mouth every 6 hours as needed (back pain), Disp: 30 tablet, Rfl: 0    rosuvastatin (CRESTOR) 20 MG tablet, TAKE ONE TABLET BY MOUTH NIGHTLY, Disp: 30 tablet, Rfl: 2    busPIRone (BUSPAR) 10 MG tablet, Take 1 tab twice daily as needed, Disp: 180 tablet, Rfl: 3    cyclobenzaprine (FLEXERIL) 10 MG tablet, Take 1 tablet by mouth 3 times daily as needed for Muscle spasms, Disp: 10 tablet, Rfl: 0    ketorolac (TORADOL) 10 MG tablet, Take 1 tablet by mouth every 6 hours as needed for Pain, Disp: 20 tablet, Rfl: 0    ASPIRIN ADULT LOW STRENGTH 81 MG EC tablet, TAKE 1 TABLET BY MOUTH ONE TIME A DAY, Disp: 30 tablet, Rfl: 2    omeprazole (PRILOSEC) 20 MG delayed release capsule, Take 1 capsule by mouth 2 times daily, Disp: 180 capsule, Rfl: 3    rOPINIRole (REQUIP) 2 MG tablet, TAKE 2 TABLETS BY MOUTH NIGHTLY, Disp: 180 tablet, Rfl: 3    vitamin D (ERGOCALCIFEROL) 1.25 MG (64034 UT) CAPS capsule, Take 1 capsule by mouth once a week, Disp: 24 capsule, Rfl: 0    lisinopril (PRINIVIL;ZESTRIL) 40 MG tablet, TAKE 1 TABLET BY MOUTH ONE TIME A DAY, Disp: 90 tablet, Rfl: 2    glipiZIDE (GLUCOTROL) 10 MG tablet, TAKE 1 TABLET BY MOUTH 2 TIMES A DAY BEFORE MEALS (REPLACES GLYBURIDE), Disp: 180 tablet, Rfl: 1    TRULICITY 1.5 NS/4.0BU SOPN, INJECT THE CONTENTS OF ONE SYRINGE UNDER THE SKIN ONCE WEEKLY, Disp: 1 pen, Rfl: 2    albuterol sulfate HFA (PROVENTIL HFA) 108 (90 Base) MCG/ACT inhaler, Inhale 2 puffs into the lungs every 4 hours as needed for Wheezing, Disp: 2 Inhaler, Rfl: 5    blood glucose test strips (PRODIGY NO CODING BLOOD GLUC) strip, Use to test once daily and as needed as directed by provider, Disp: 100 each, Rfl: 3    diclofenac sodium (VOLTAREN) 1 % GEL, Apply 2 g topically 2 times daily (Patient taking differently: Apply 2 g topically 2 times daily Prn pain), Disp: 100 g, Rfl: 2    ondansetron (ZOFRAN) 4 MG tablet, Take 1 tablet by mouth 3 times daily as needed for Nausea or Vomiting, Disp: 30 tablet, Rfl: 0    acetaminophen (ACETAMINOPHEN EXTRA STRENGTH) 500 MG tablet, Take 1,000 mg by mouth daily as needed for Pain, Disp: , Rfl:     bimatoprost (LUMIGAN) 0.01 % SOLN ophthalmic drops, Place 1 drop into both eyes nightly, Disp: , Rfl:     PRODIGY LANCETS 28G MISC, 1 Bottle by Does not apply route three times daily, Disp: 100 each, Rfl: 3    Blood Glucose Monitoring Suppl (PRODIGY AUTOCODE BLOOD GLUCOSE) w/Device KIT, 1 Device by Does not apply route 3 times daily, Disp: 1 kit, Rfl: 0    gabapentin (NEURONTIN) 300 MG capsule, Take 1 capsule by mouth 3 times daily for 7 days. Intended supply: 30 days, Disp: 21 capsule, Rfl: 0    Family History   Problem Relation Age of Onset    Heart Attack Mother     Diabetes Mother     Diabetes Father     Heart Attack Father        Social History     Socioeconomic History    Marital status:      Spouse name: Not on file    Number of children: Not on file    Years of education: Not on file    Highest education level: Not on file   Occupational History    Not on file   Tobacco Use    Smoking status: Never Smoker    Smokeless tobacco: Never Used   Vaping Use    Vaping Use: Never used   Substance and Sexual Activity    Alcohol use:  Yes     Alcohol/week: 0.0 standard drinks     Comment: rarely/ 4 times a year    Drug use: No    Sexual activity: Yes   Other Topics Concern    Not on file   Social History Narrative    Not on file     Social Determinants of Health     Financial Resource Strain:     Difficulty of Paying Living Expenses: Not on file   Food Insecurity:     Worried About Running Out of Food in the Last Year: Not on file    Valerie of Food in the Last Year: Not on file   Transportation Needs:     Lack of Transportation (Medical): Not on file    Lack of Transportation (Non-Medical): Not on file   Physical Activity:     Days of Exercise per Week: Not on file    Minutes of Exercise per Session: Not on file   Stress:     Feeling of Stress : Not on file   Social Connections:     Frequency of Communication with Friends and Family: Not on file    Frequency of Social Gatherings with Friends and Family: Not on file    Attends [employer]ism Services: Not on file    Active Member of [address] [place] [address] or Organizations: Not on file    Attends Club or Organization Meetings: Not on file    Marital Status: Not on file   Intimate Partner Violence:     Fear of Current or Ex-Partner: Not on file    Emotionally Abused: Not on file    Physically Abused: Not on file    Sexually Abused: Not on file   Housing Stability:     Unable to Pay for Housing in the Last Year: Not on file    Number of [place] in the Last Year: Not on file    Unstable Housing in the Last Year: Not on file       Review of Systems:  Review of Systems   Constitutional: Negative for chills and fever. Cardiovascular: Negative for chest pain and palpitations. Respiratory: Negative for cough and shortness of breath. Musculoskeletal: Positive for back pain. Gastrointestinal: Negative for constipation. Neurological: Positive for numbness. Negative for disturbances in coordination and loss of balance. Physical Exam:  There were no vitals taken for this visit. Physical Exam  Neurological:      Mental Status: She is alert.    Psychiatric:         Mood and Affect: Mood normal.         Record/Diagnostics Review:    As reviewed in [facility]    Assessment:  Problem List Items Addressed This Visit     Lumbar radiculopathy - Primary             Treatment Plan:  Patient reports significant relief following LESI - 75-80% ongoing  Follow up as needed    [address], was evaluated through a synchronous (real-time) audio-video encounter. The patient (or guardian if applicable) is aware that this is a billable service. Verbal consent to proceed has been obtained within the past 12 months. The visit was conducted pursuant to the emergency declaration under the [address] [place] [address] and the [employer] Act. Patient identification was verified, and a caregiver was present when appropriate. The patient was located in a state where the provider was credentialed to provide care. Total time spent for this encounter: 20 minutes    --ORLY Healy CNP on 12/6/2021 at 1:18 PM    An electronic signature was used to authenticate this note.

## 2021-12-07 DIAGNOSIS — U07.1 COVID: ICD-10-CM

## 2021-12-07 LAB
SARS-COV-2: ABNORMAL
SARS-COV-2: DETECTED
SOURCE: ABNORMAL

## 2021-12-12 NOTE — TELEPHONE ENCOUNTER
spondylosis     Type II or unspecified type diabetes mellitus without mention of complication, not stated as uncontrolled     Hypertension     Abnormal auditory perception     Eustachian tube dysfunction     Chronic serous otitis media     Nasal polyps     Nasal congestion     Osteoarthritis of left knee     Osteoarthritis of right knee     DIEGO (nonalcoholic steatohepatitis)     Vitamin D deficiency     Iron deficiency anemia     Dyslipidemia     Diabetes mellitus type 2, uncontrolled (HCC)     Left hip pain     Trochanteric bursitis     Fatigue     Daytime somnolence     Snoring     Chronic left shoulder pain     Restless legs syndrome     Generalized anxiety disorder     Primary osteoarthritis, left shoulder     Tendinopathy of left shoulder     Adhesive capsulitis of left shoulder     Lumbar radiculopathy

## 2021-12-13 RX ORDER — GLIPIZIDE 10 MG/1
TABLET ORAL
Qty: 180 TABLET | Refills: 1 | Status: SHIPPED | OUTPATIENT
Start: 2021-12-13 | End: 2022-01-13 | Stop reason: SDUPTHER

## 2021-12-13 RX ORDER — ASPIRIN 81 MG/1
TABLET, COATED ORAL
Qty: 30 TABLET | Refills: 2 | Status: SHIPPED | OUTPATIENT
Start: 2021-12-13 | End: 2022-04-06

## 2021-12-13 NOTE — TELEPHONE ENCOUNTER
Trang Request for pending medication. Last Visit Date: 10/7/21  Next Visit Date:  Future Appointments   Date Time Provider Nicola Zaidi   1/6/2022  4:00 PM Kapil Chamberlain  [facility] Maintenance   Topic Date Due    Shingles Vaccine (1 of 2) Never done    Diabetic retinal exam  01/12/2017    Diabetic foot exam  12/01/2017    DTaP/Tdap/Td vaccine (2 - Tdap) 12/15/2020    COVID-19 Vaccine (3 - Booster for Moderna series) 08/11/2021    Diabetic microalbuminuria test  06/11/2022    Lipid screen  06/24/2022    Potassium monitoring  07/21/2022    Creatinine monitoring  07/21/2022    A1C test (Diabetic or Prediabetic)  09/14/2022    Colon cancer screen colonoscopy  09/20/2022    Breast cancer screen  11/14/2022    Pneumococcal 0-64 years Vaccine (2 of 2 - PPSV23) 03/10/2026    Flu vaccine  Completed    Hepatitis C screen  Completed    HIV screen  Completed    Hepatitis A vaccine  Aged Out    Hib vaccine  Aged Out    Meningococcal (ACWY) vaccine  Aged Out       Hemoglobin A1C (%)   Date Value   09/14/2021 7.7   06/24/2021 7.9 (H)   04/20/2021 7.3             ( goal A1C is < 7)   Microalb/Crt.  Ratio (mcg/mg creat)   Date Value   06/11/2021 CANNOT BE CALCULATED     LDL Cholesterol (mg/dL)   Date Value   06/24/2021 52       (goal LDL is <100)   AST (U/L)   Date Value   02/04/2021 18     ALT (U/L)   Date Value   02/04/2021 20     BUN (mg/dL)   Date Value   07/21/2021 30 (H)     BP Readings from Last 3 Encounters:   11/22/21 115/70   11/10/21 111/77   10/07/21 126/76          (goal 120/80)    All Future Testing planned in [facility]  Lab Frequency Next Occurrence   Basic Metabolic Panel Once 58/76/3642   Saline lock IV Once 05/10/2022   Lumbar Epidural Steroid Injection/Caudal Once 11/10/2021       Next Visit Date:  Future Appointments   Date Time Provider Nicola Zaidi   1/6/2022  4:00 PM Kapil Chamberlain MD [id number]         Patient Active Problem List:     Cervical spondylosis     Type II or unspecified type diabetes mellitus without mention of complication, not stated as uncontrolled     Hypertension     Abnormal auditory perception     Eustachian tube dysfunction     Chronic serous otitis media     Nasal polyps     Nasal congestion     Osteoarthritis of left knee     Osteoarthritis of right knee     DIEGO (nonalcoholic steatohepatitis)     Vitamin D deficiency     Iron deficiency anemia     Dyslipidemia     Diabetes mellitus type 2, uncontrolled (HCC)     Left hip pain     Trochanteric bursitis     Fatigue     Daytime somnolence     Snoring     Chronic left shoulder pain     Restless legs syndrome     Generalized anxiety disorder     Primary osteoarthritis, left shoulder     Tendinopathy of left shoulder     Adhesive capsulitis of left shoulder     Lumbar radiculopathy

## 2021-12-15 ENCOUNTER — TELEPHONE (OUTPATIENT)
Dept: FAMILY MEDICINE CLINIC | Age: 61
End: 2021-12-15

## 2021-12-15 NOTE — TELEPHONE ENCOUNTER
Patient called and stated that she still has a cough, headache/sinus pressure, buzzing in ears/head, no smell, can barely taste and is exhausted. She would like to know what to do. Symptoms started 12/4/2021. She also is having paper work sent over for [facility]. And was asked when her doctor is going to release her to go back to work. Please advise.

## 2021-12-16 ENCOUNTER — VIRTUAL VISIT (OUTPATIENT)
Dept: FAMILY MEDICINE CLINIC | Age: 61
End: 2021-12-16
Payer: COMMERCIAL

## 2021-12-16 DIAGNOSIS — U07.1 COVID: Primary | ICD-10-CM

## 2021-12-16 PROCEDURE — 99442 PR PHYS/QHP TELEPHONE EVALUATION 11-20 MIN: CPT | Performed by: STUDENT IN AN ORGANIZED HEALTH CARE EDUCATION/TRAINING PROGRAM

## 2021-12-16 SDOH — ECONOMIC STABILITY: FOOD INSECURITY: WITHIN THE PAST 12 MONTHS, THE FOOD YOU BOUGHT JUST DIDN'T LAST AND YOU DIDN'T HAVE MONEY TO GET MORE.: NEVER TRUE

## 2021-12-16 SDOH — ECONOMIC STABILITY: FOOD INSECURITY: WITHIN THE PAST 12 MONTHS, YOU WORRIED THAT YOUR FOOD WOULD RUN OUT BEFORE YOU GOT MONEY TO BUY MORE.: NEVER TRUE

## 2021-12-16 ASSESSMENT — SOCIAL DETERMINANTS OF HEALTH (SDOH): HOW HARD IS IT FOR YOU TO PAY FOR THE VERY BASICS LIKE FOOD, HOUSING, MEDICAL CARE, AND HEATING?: NOT VERY HARD

## 2021-12-16 NOTE — PROGRESS NOTES
[facility] Medicine Residency Program - Virtual Visit        Blanca Elizondo is a 62 y.o. female evaluated via telephone on 12/16/2021. Consent:  She and/or health care decision maker is aware that that she may receive a bill for this telephone service, depending on her insurance coverage, and has provided verbal consent to proceed: Yes      Documentation:  I communicated with the patient and/or health care decision maker about not feeling well, on 12/4 had cold sx, on 12/6 positive for covid. No energy, still not feeling well, mostly fatigue and cough. Will stay out of work until 12/21/21. Denies dyspnea. Details of this discussion including any medical advice provided:     ASSESSMENT/PLAN:    1. COVID  - Patient is recovering slowly, would benefit from additional days off work due to fatigue and continued cough. Symptomatic treatment for now, if worsening sx patient will contact our office or go to ED. Requested Prescriptions      No prescriptions requested or ordered in this encounter       There are no discontinued medications. Return in about 1 month (around 1/16/2022). I affirm this is a Patient Initiated Episode with a Patient who has not had a related appointment within my department in the past 7 days or scheduled within the next 24 hours. Patient identification was verified at the start of the visit: Yes    Total Time: minutes: 11-20 minutes    Note: not billable if this call serves to triage the patient into an appointment for the relevant concern      Lluvia Bruner.  Deena Kaur MD  Family Medicine PGY-3  12/16/21 at 8:56 AM

## 2021-12-17 NOTE — PROGRESS NOTES
Attending Physician Statement  I  have discussed the care of Yumiko Alcantar including pertinent history and exam findings with the resident. I agree with the assessment, plan and orders as documented by the resident. There were no vitals taken for this visit. BP Readings from Last 3 Encounters:   11/22/21 115/70   11/10/21 111/77   10/07/21 126/76     Wt Readings from Last 3 Encounters:   11/22/21 198 lb (89.8 kg)   11/10/21 194 lb (88 kg)   10/14/21 194 lb (88 kg)          Diagnosis Orders   1.  CHUNG Garvin DO 12/17/2021 2:56 PM

## 2021-12-20 ENCOUNTER — TELEPHONE (OUTPATIENT)
Dept: PHARMACY | Facility: CLINIC | Age: 61
End: 2021-12-20

## 2021-12-20 NOTE — TELEPHONE ENCOUNTER
Called patient to schedule 2022 annual pharmacist appointment to discuss medications for Diabetes Management Program.    Left message on voicemail for patient to call us back @ [phone] option 3 to schedule 2022 Be Well With Diabetes pharmacist appt.

## 2021-12-20 NOTE — LETTER
Rudi [facility]rett  [address], [place] [address]  Phone: toll free [phone] Option #3      [address] [address] [place] [address]         12/30/21     Dear Drake Brambila,    Congratulations! You have completed the 2021 requirements for the [facility] Be Well With Diabetes Program. You have been automatically re-enrolled into the [facility] Be Well With Diabetes Program for 2022. One of the requirements to participate in the [facility] Be Well With Diabetes Program is to complete a Clinical Pharmacist Telephone appointment yearly. The [facility]i [facility] Team has attempted to contact you to schedule your 2022 Diabetes Management telephone appointment but was unable to reach you. We would like to work with you and your doctor to:  - Review your medications, including over-the-counter and herbal medications  - Answer questions about your medications and how to get the most benefit from them  - Identify potential drug interactions or side effects and help fix them  - Identify preferred medications that are equally effective, but available at a lower cost to you  - Help you reach the necessary requirements to remain enrolled in the Diabetes Management Program offered by [facility]     Please call [phone] and select option #3 to schedule this appointment to take advantage of this service. Telephone appointments are available Monday thru Friday from 7:30 AM till 5:30 PM.     This is a courtesy reminder. If you have this appointment already scheduled for your 2022 enrollment in the program, please disregard this message. If you have not scheduled this appointment yet, please contact us at the above number to schedule.      Sincerely,      [address]  Phone: [phone] Option #3

## 2021-12-21 ENCOUNTER — TELEPHONE (OUTPATIENT)
Dept: FAMILY MEDICINE CLINIC | Age: 61
End: 2021-12-21

## 2021-12-21 NOTE — TELEPHONE ENCOUNTER
Letter is in patients chart, please have it printed and faxed, if it needs signing I will be in on Thursday, or another provider in the clinic can sign it. Thanks.

## 2021-12-21 NOTE — LETTER
[address]  [address]  [address] [facility] [address]  Phone: [phone]  Fax: [phone]    Brigitte Nugent MD        December 21, 2021     Patient: Michelle Mathews   YOB: 1960   Date of Visit: 12/21/2021       To Whom It May Concern: It is my medical opinion that Barbara Doll should remain out of work until 12/27/2021. If you have any questions or concerns, please don't hesitate to call.     Sincerely,        Brigitte Nugent MD

## 2021-12-30 NOTE — TELEPHONE ENCOUNTER
Jigna letter sent and letter mailed to home address to schedule 2022 Annual Pharmacist Visit.     For [place] in place:  No   Recommendation Provided To: Patient/Caregiver: 1 via Telephone and Sam Summers 20 Intervention Detail: Scheduled Appointment   Gap Closed?: yes   Intervention Accepted By:   Jam Lopez Time Spent (min): 5

## 2022-01-03 DIAGNOSIS — E11.9 TYPE 2 DIABETES MELLITUS WITHOUT COMPLICATION, WITHOUT LONG-TERM CURRENT USE OF INSULIN (HCC): ICD-10-CM

## 2022-01-03 RX ORDER — DULAGLUTIDE 1.5 MG/.5ML
INJECTION, SOLUTION SUBCUTANEOUS
Qty: 6 ML | Refills: 2 | Status: SHIPPED | OUTPATIENT
Start: 2022-01-03 | End: 2022-09-21

## 2022-01-03 NOTE — TELEPHONE ENCOUNTER
E-scribe request for med refill. Please review and e-scribe if applicable. Last Visit Date:  12/21/2021  Next Visit Date:  Visit date not found    Hemoglobin A1C (%)   Date Value   09/14/2021 7.7   06/24/2021 7.9 (H)   04/20/2021 7.3             ( goal A1C is < 7)   Microalb/Crt.  Ratio (mcg/mg creat)   Date Value   06/11/2021 CANNOT BE CALCULATED     LDL Cholesterol (mg/dL)   Date Value   06/24/2021 52       (goal LDL is <100)   AST (U/L)   Date Value   02/04/2021 18     ALT (U/L)   Date Value   02/04/2021 20     BUN (mg/dL)   Date Value   07/21/2021 30 (H)     BP Readings from Last 3 Encounters:   11/22/21 115/70   11/10/21 111/77   10/07/21 126/76          (goal 120/80)        Patient Active Problem List:     Cervical spondylosis     Type II or unspecified type diabetes mellitus without mention of complication, not stated as uncontrolled     Hypertension     Abnormal auditory perception     Eustachian tube dysfunction     Chronic serous otitis media     Nasal polyps     Nasal congestion     Osteoarthritis of left knee     Osteoarthritis of right knee     DIEGO (nonalcoholic steatohepatitis)     Vitamin D deficiency     Iron deficiency anemia     Dyslipidemia     Diabetes mellitus type 2, uncontrolled (HCC)     Left hip pain     Trochanteric bursitis     Fatigue     Daytime somnolence     Snoring     Chronic left shoulder pain     Restless legs syndrome     Generalized anxiety disorder     Primary osteoarthritis, left shoulder     Tendinopathy of left shoulder     Adhesive capsulitis of left shoulder     Lumbar radiculopathy      ----Al Cortez

## 2022-01-10 DIAGNOSIS — E78.5 DYSLIPIDEMIA: ICD-10-CM

## 2022-01-11 RX ORDER — ROSUVASTATIN CALCIUM 20 MG/1
TABLET, COATED ORAL
Qty: 30 TABLET | Refills: 2 | Status: SHIPPED | OUTPATIENT
Start: 2022-01-11 | End: 2022-05-16

## 2022-01-11 NOTE — TELEPHONE ENCOUNTER
Last visit: 12/16/2021  Last Med refill: 11/13/2021  Does patient have enough medication for 72 hours: No:     Next Visit Date:  Future Appointments   Date Time Provider Nicola Zaidi   1/13/2022 10:00 AM SCHEDULE, [facility] Rx None       Health Maintenance   Topic Date Due    Shingles Vaccine (1 of 2) Never done    Diabetic retinal exam  01/12/2017    Diabetic foot exam  12/01/2017    DTaP/Tdap/Td vaccine (2 - Tdap) 12/15/2020    COVID-19 Vaccine (3 - Booster for Moderna series) 08/11/2021    Depression Screen  01/21/2022    Diabetic microalbuminuria test  06/11/2022    Lipid screen  06/24/2022    Potassium monitoring  07/21/2022    Creatinine monitoring  07/21/2022    A1C test (Diabetic or Prediabetic)  09/14/2022    Colon cancer screen colonoscopy  09/20/2022    Breast cancer screen  11/14/2022    Pneumococcal 0-64 years Vaccine (2 of 2 - PPSV23) 03/10/2026    Flu vaccine  Completed    Hepatitis C screen  Completed    HIV screen  Completed    Hepatitis A vaccine  Aged Out    Hib vaccine  Aged Out    Meningococcal (ACWY) vaccine  Aged Out       Hemoglobin A1C (%)   Date Value   09/14/2021 7.7   06/24/2021 7.9 (H)   04/20/2021 7.3             ( goal A1C is < 7)   Microalb/Crt.  Ratio (mcg/mg creat)   Date Value   06/11/2021 CANNOT BE CALCULATED     LDL Cholesterol (mg/dL)   Date Value   06/24/2021 52   04/20/2021 48       (goal LDL is <100)   AST (U/L)   Date Value   02/04/2021 18     ALT (U/L)   Date Value   02/04/2021 20     BUN (mg/dL)   Date Value   07/21/2021 30 (H)     BP Readings from Last 3 Encounters:   11/22/21 115/70   11/10/21 111/77   10/07/21 126/76          (goal 120/80)    All Future Testing planned in [facility]  Lab Frequency Next Occurrence   Basic Metabolic Panel Once 97/49/2038   Saline lock IV Once 05/10/2022   Lumbar Epidural Steroid Injection/Caudal Once 11/10/2021               Patient Active Problem List:     Cervical spondylosis     Type II or unspecified type diabetes mellitus without mention of complication, not stated as uncontrolled     Hypertension     Abnormal auditory perception     Eustachian tube dysfunction     Chronic serous otitis media     Nasal polyps     Nasal congestion     Osteoarthritis of left knee     Osteoarthritis of right knee     DIEGO (nonalcoholic steatohepatitis)     Vitamin D deficiency     Iron deficiency anemia     Dyslipidemia     Diabetes mellitus type 2, uncontrolled (HCC)     Left hip pain     Trochanteric bursitis     Fatigue     Daytime somnolence     Snoring     Chronic left shoulder pain     Restless legs syndrome     Generalized anxiety disorder     Primary osteoarthritis, left shoulder     Tendinopathy of left shoulder     Adhesive capsulitis of left shoulder     Lumbar radiculopathy

## 2022-01-12 NOTE — TELEPHONE ENCOUNTER
Called patient to confirm pharmacist appointment tomorrow. Left vm asking patient to call back if their appointment need to be rescheduled.

## 2022-01-13 ENCOUNTER — SCHEDULED TELEPHONE ENCOUNTER (OUTPATIENT)
Dept: PHARMACY | Facility: CLINIC | Age: 62
End: 2022-01-13

## 2022-01-13 RX ORDER — GLIPIZIDE 10 MG/1
TABLET ORAL
Qty: 180 TABLET | Refills: 1 | Status: SHIPPED | OUTPATIENT
Start: 2022-01-13

## 2022-01-13 RX ORDER — BUSPIRONE HYDROCHLORIDE 10 MG/1
TABLET ORAL
Qty: 270 TABLET | Refills: 3 | Status: ON HOLD | OUTPATIENT
Start: 2022-01-13 | End: 2022-06-09 | Stop reason: HOSPADM

## 2022-01-13 NOTE — TELEPHONE ENCOUNTER
Dr. Madeline Shelby MD,    Your patient is currently enrolled in the Be Well with Diabetes program. After your patient's recent visit with a [facility] Clinical Pharmacist, the below were identified as opportunities to assist with their diabetes management (if patient is not eligible for below recommendations, please reply with reason/contraindication):   · Patient reported that she is taking her buspirone 3x daily. It looks like it had been prescribed that way at one point, but changed to 2x daily recently. If you are ok with her continuing 3x daily, please send a new prescription to the pharmacy so she will be able to refill on time. I have pended an order for your convenience     If not, please reach out and discuss with patient or route back to me and I can let her know. See pharmacist note dated 1/13/21 for complete details. Thank you,  ASTER Scott, PharmD, [address] [facility], toll free: [phone]

## 2022-01-13 NOTE — TELEPHONE ENCOUNTER
Last visit:   Last Med refill:   Does patient have enough medication for 72 hours: No:     Next Visit Date:  Future Appointments   Date Time Provider Nicola Zaidi   1/13/2022 10:00 AM SCHEDULE, [facility] None       Health Maintenance   Topic Date Due    Shingles Vaccine (1 of 2) Never done    Diabetic retinal exam  01/12/2017    Diabetic foot exam  12/01/2017    DTaP/Tdap/Td vaccine (2 - Tdap) 12/15/2020    COVID-19 Vaccine (3 - Booster for Moderna series) 08/11/2021    Depression Screen  01/21/2022    Diabetic microalbuminuria test  06/11/2022    Lipid screen  06/24/2022    Potassium monitoring  07/21/2022    Creatinine monitoring  07/21/2022    A1C test (Diabetic or Prediabetic)  09/14/2022    Colon cancer screen colonoscopy  09/20/2022    Breast cancer screen  11/14/2022    Pneumococcal 0-64 years Vaccine (2 of 2 - PPSV23) 03/10/2026    Flu vaccine  Completed    Hepatitis C screen  Completed    HIV screen  Completed    Hepatitis A vaccine  Aged Out    Hib vaccine  Aged Out    Meningococcal (ACWY) vaccine  Aged Out       Hemoglobin A1C (%)   Date Value   09/14/2021 7.7   06/24/2021 7.9 (H)   04/20/2021 7.3             ( goal A1C is < 7)   Microalb/Crt.  Ratio (mcg/mg creat)   Date Value   06/11/2021 CANNOT BE CALCULATED     LDL Cholesterol (mg/dL)   Date Value   06/24/2021 52   04/20/2021 48       (goal LDL is <100)   AST (U/L)   Date Value   02/04/2021 18     ALT (U/L)   Date Value   02/04/2021 20     BUN (mg/dL)   Date Value   07/21/2021 30 (H)     BP Readings from Last 3 Encounters:   11/22/21 115/70   11/10/21 111/77   10/07/21 126/76          (goal 120/80)    All Future Testing planned in [facility]  Lab Frequency Next Occurrence   Basic Metabolic Panel Once 63/14/2792   Saline lock IV Once 05/10/2022   Lumbar Epidural Steroid Injection/Caudal Once 11/10/2021               Patient Active Problem List:     Cervical spondylosis     Type II or unspecified type diabetes mellitus without mention of complication, not stated as uncontrolled     Hypertension     Abnormal auditory perception     Eustachian tube dysfunction     Chronic serous otitis media     Nasal polyps     Nasal congestion     Osteoarthritis of left knee     Osteoarthritis of right knee     DIEGO (nonalcoholic steatohepatitis)     Vitamin D deficiency     Iron deficiency anemia     Dyslipidemia     Diabetes mellitus type 2, uncontrolled (HCC)     Left hip pain     Trochanteric bursitis     Fatigue     Daytime somnolence     Snoring     Chronic left shoulder pain     Restless legs syndrome     Generalized anxiety disorder     Primary osteoarthritis, left shoulder     Tendinopathy of left shoulder     Adhesive capsulitis of left shoulder     Lumbar radiculopathy           Please address the medication refill and close the encounter. If I can be of assistance, please route to the applicable pool. Thank you.

## 2022-01-13 NOTE — TELEPHONE ENCOUNTER
[facility]– [place]INICAL PHARMACY REVIEW - Be Well with Diabetes    Winter Deras is a 62 y.o. female enrolled in the [address] [place] [address] Diabetes Program. Patient provided Wade Meek with verbal consent to remain in the program for this year. Patient enrolled 2018.     Medications:  Current Outpatient Medications   Medication Sig Dispense Refill    rosuvastatin (CRESTOR) 20 MG tablet TAKE ONE TABLET BY MOUTH NIGHTLY 30 tablet 2    vitamin D (ERGOCALCIFEROL) 1.25 MG (42089 UT) CAPS capsule TAKE ONE CAPSULE BY MOUTH ONCE A WEEK (EVERY 7 DAYS) 24 capsule 0    TRULICITY 1.5 BQ/6.0LK SOPN INJECT THE CONTENTS OF ONE SYRINGE UNDER THE SKIN ONCE WEEKLY 6 mL 2    ASPIRIN LOW DOSE 81 MG EC tablet TAKE 1 TABLET BY MOUTH ONE TIME A DAY 30 tablet 2    glipiZIDE (GLUCOTROL) 10 MG tablet Take 1 tab bid 180 tablet 1    chlorthalidone (HYGROTON) 25 MG tablet TAKE 1 TABLET BY MOUTH ONE TIME A DAY 90 tablet 1    SYNJARDY 5-1000 MG TABS TAKE 1 TABLET BY MOUTH 2 TIMES A  tablet 2    tiZANidine (ZANAFLEX) 2 MG tablet Take 1 tablet by mouth every 6 hours as needed (back pain) 30 tablet 0    busPIRone (BUSPAR) 10 MG tablet Take 1 tab twice daily as needed (Patient taking differently: Take 1 tab three times daily as needed) 180 tablet 3    omeprazole (PRILOSEC) 20 MG delayed release capsule Take 1 capsule by mouth 2 times daily 180 capsule 3    rOPINIRole (REQUIP) 2 MG tablet TAKE 2 TABLETS BY MOUTH NIGHTLY 180 tablet 3    lisinopril (PRINIVIL;ZESTRIL) 40 MG tablet TAKE 1 TABLET BY MOUTH ONE TIME A DAY 90 tablet 2    albuterol sulfate HFA (PROVENTIL HFA) 108 (90 Base) MCG/ACT inhaler Inhale 2 puffs into the lungs every 4 hours as needed for Wheezing 2 Inhaler 5    blood glucose test strips (PRODIGY NO CODING BLOOD GLUC) strip Use to test once daily and as needed as directed by provider 100 each 3    diclofenac sodium (VOLTAREN) 1 % GEL Apply 2 g topically 2 times daily (Patient taking differently: Apply 2 g topically 2 times daily Prn pain) 100 g 2    acetaminophen (ACETAMINOPHEN EXTRA STRENGTH) 500 MG tablet Take 1,000 mg by mouth daily as needed for Pain      bimatoprost (LUMIGAN) 0.01 % SOLN ophthalmic drops Place 1 drop into both eyes nightly      PRODIGY LANCETS 28G MISC 1 Bottle by Does not apply route three times daily 100 each 3    Blood Glucose Monitoring Suppl (PRODIGY AUTOCODE BLOOD GLUCOSE) w/Device KIT 1 Device by Does not apply route 3 times daily 1 kit 0     No current facility-administered medications for this visit. Current Pharmacy: [facility]  Current testing supplies/frequency: [facility] Prescription to test 3x daily. Has not filled in ~1yr  Pen needles/syringes: n/a    Allergies:   Allergies   Allergen Reactions    Codeine Nausea And Vomiting      Vitals/Labs:  BP Readings from Last 3 Encounters:   11/22/21 115/70   11/10/21 111/77   10/07/21 126/76     Lab Results   Component Value Date    LABMICR CANNOT BE CALCULATED 06/11/2021     Lab Results   Component Value Date    LABA1C 7.7 09/14/2021    LABA1C 7.9 (H) 06/24/2021    LABA1C 7.3 04/20/2021     Lab Results   Component Value Date    CHOL 154 06/24/2021    TRIG 217 (H) 06/24/2021    HDL 59 06/24/2021    LDLCHOLESTEROL 52 06/24/2021    LDLDIRECT 156 (H) 05/20/2017     ALT   Date Value Ref Range Status   02/04/2021 20 5 - 33 U/L Final     AST   Date Value Ref Range Status   02/04/2021 18 <32 U/L Final     The 10-year ASCVD risk score (Citlaly Tilley et al., 2013) is: 5.3%    Values used to calculate the score:      Age: 62 years      Sex: Female      Is Non- : No      Diabetic: Yes      Tobacco smoker: No      Systolic Blood Pressure: 126 mmHg      Is BP treated: Yes      HDL Cholesterol: 59 mg/dL      Total Cholesterol: 154 mg/dL     Lab Results   Component Value Date    CREATININE 1.01 (H) 07/21/2021     Lab Results   Component Value Date    LABGLOM 56 07/21/2021    LABGLOM 50 06/11/2021    LABGLOM 48 04/20/2021    LABGLOM 48 02/04/2021     Immunizations:  Immunization History   Administered Date(s) Administered    COVID-19, Moderna, Primary or Immunocompromised, PF, 100mcg/0.5mL 01/14/2021, 02/11/2021    DTaP 12/15/2010    DTaP vaccine 12/15/2010    Influenza Virus Vaccine 12/06/2013, 11/27/2015, 11/04/2016, 10/31/2017    Influenza Whole 12/06/2013    Influenza, Quadv, 6 mo and older, IM (Fluzone, Flulaval) 10/31/2017    Influenza, Quadv, IM, (6 mo and older Fluzone, Flulaval, Fluarix and 3 yrs and older Afluria) 11/04/2016, 10/31/2017    Influenza, Megan Copas, IM, PF (6 mo and older Fluzone, Flulaval, Fluarix, and 3 yrs and older Afluria) 11/04/2016, 11/16/2018, 12/05/2019, 09/29/2020, 09/14/2021    Pneumococcal Polysaccharide ([id number]) 11/27/2015      Social History:  Social History     Tobacco Use    Smoking status: Never Smoker    Smokeless tobacco: Never Used   Substance Use Topics    Alcohol use: Yes     Alcohol/week: 0.0 standard drinks     Comment: rarely/ 4 times a year     ASSESSMENT:  Initial Program Requirements (Y indicates has completed for the year, N indicates needs to be completed by 07/01/2022): No - Provider Visit for DM (1st)  No - A1c (1st)     Ongoing Program Requirements (Y indicates has completed for the year, N indicates needs to be completed by 12/31/2022):   No - Provider Visit for DM (2nd)  Yes - ACC/diabetes educator visit (if A1c over 8%)  No - A1c (2nd)  No - Lipid panel  No - Urine microalbumin  Yes - Pneumococcal vaccination: Up-to-date, not needed again until age 72  No - Influenza vaccination for Fall 2022  Yes - Medication adherence over 70%- per [place]   Yes - On statin or contraindication(s) Rosuvastatin  Yes - On ACEi/ARB or contraindication(s) Lisinopril     Current medications eligible for copay waiver, up to $600, through [employer]:  - ASA, Chlorthalidone, Glipizide, Lisinopril, Rosuvastatin, Synjardy, Trulicity  - Prodigy    Diabetes Care:   - Glycemic Goal: <7.0%. Is not at blood glucose goal.  Patient and provider have been ok with an A1c in the mid-7's which is where she has been. Discussed the benefits of further lowering to <7% and with a few small changes, this should be pretty easily attainable. - Home blood sugar records:  Patient reports testing very rarely, but when she does she sees readings in the 100-180 range. She says it is typically higher or lower depending on what she eats in the evenings prior  - Any episodes of hypoglycemia? no  - Duplicate MOA: Repaglinide and Glipizide- Patient was recently taken off Repaglinide  - Therapy Optimization: Discussed potential medication increases if A1c is still slightly elevated. These include Trulicity to 3mg or Synjardy to 12.5-1000mg BID  - Daily aspirin? Yes  - Adherent to ACEi/ARB and/or statin: Yes patient is adherent based on [place] refill history  - Medication compliance: compliant all of the time  - Diet compliance: compliant most of the time  - Current exercise: no regular exercise. Patient works from home and has trouble getting regular exercise in. Discussed small things she can do to be more active throughout the day such as setting reminders to get up and walk around for a few minutes. - In December patient had COVID and has recovered. - In the fall, patient had in issue with Sciatica causing significant pain. She did get a steroid injection at one point, but does not expect to need another. Discussed how steroids can increase blood sugar. Encouraged more frequent self monitoring if she has to have another.  - Patient mentioned a supplement containing Echinacea, vit C, and elderberry. No concern with interactions. Other Considerations:  - Blood Pressure Goal: BP less than 140/90 mmHg due to history of DM: Is at blood pressure goal.   - Lipids: Patient is prescribed high-intensity statin therapy.   - Smoking status: Never smoked  - Other: She is taking Buspirone TID, but current prescription only shows BID dosing. Will attempt to get an updated prescription    PLAN:  - Consideration(s) for provider:   · Buspirone 10mg TID- pended script  - DM program gaps identified:   · Initial requirements: Provider Visit for DM (1st) and A1c (1st)   · Ongoing requirements: Provider visit for DM (2nd), A1c (2nd), Lipid panel, Urine microalbumin, Influenza vaccination for [id number] and Medication adherence over 70%   - Education to patient: Discussed general issues about diabetes pathophysiology and management., Addressed diet and exercise, Addressed medication adherence, Overview of [facility] Well With Diabetes program, Overview of [facility] and Reminder A1c and lipid panel can be completed for free at [facility] screenings   - Follow up: PCP for identified gaps or as scheduled below  - Upcoming appointments:   Future Appointments   Date Time Provider Nicola Zaidi   1/13/2022 10:00 AM SCHEDULE, [facility] Rx None       ASTER Pa, PharmD, [address] [facility], toll free: [phone]      For Pharmacy Admin Tracking Only     CPA in place:  No   Recommendation Provided To: Provider: 1 via Note to Provider   Intervention Detail: New Rx: 1, reason: Cost/Formulary Change   Gap Closed?: Yes    Intervention Accepted By: Provider: 1   Time Spent (min): 60

## 2022-03-15 ENCOUNTER — OFFICE VISIT (OUTPATIENT)
Dept: ORTHOPEDIC SURGERY | Age: 62
End: 2022-03-15
Payer: COMMERCIAL

## 2022-03-15 VITALS — RESPIRATION RATE: 14 BRPM | WEIGHT: 198 LBS | HEIGHT: 63 IN | BODY MASS INDEX: 35.08 KG/M2

## 2022-03-15 DIAGNOSIS — M54.16 LUMBAR RADICULAR PAIN: Primary | ICD-10-CM

## 2022-03-15 DIAGNOSIS — M54.50 LOW BACK PAIN, UNSPECIFIED BACK PAIN LATERALITY, UNSPECIFIED CHRONICITY, UNSPECIFIED WHETHER SCIATICA PRESENT: ICD-10-CM

## 2022-03-15 PROCEDURE — 99213 OFFICE O/P EST LOW 20 MIN: CPT | Performed by: ORTHOPAEDIC SURGERY

## 2022-03-15 RX ORDER — PREDNISONE 10 MG/1
TABLET ORAL
Qty: 15 TABLET | Refills: 0 | Status: SHIPPED | OUTPATIENT
Start: 2022-03-15 | End: 2022-03-25

## 2022-03-15 NOTE — PROGRESS NOTES
Patient ID: Kelsey Thapa is a 65 y.o. female    Chief Compliant:  Chief Complaint   Patient presents with    Lower Back Pain        Diagnostic imaging:    Previous MRI from last summer I interpret is having largely a central disc bulge at L5-S1 if anything favoring the right not the left side    Assessment and Plan:  1. Lumbar radicular pain    2. Low back pain, unspecified back pain laterality, unspecified chronicity, unspecified whether sciatica present      Textbook classic S1 left radicular pain with a positive straight leg raise    Unfortunately prior MRI did not demonstrate a substantial nerve impingement or disc herniation in my opinion    Patient has had a significant increase in her pain      Left S1 radiculopathy acute worsening 2 weeks ago    MRI lumbar spine at [facility]    Prednisone    Follow up after MRI    HPI:  This is a 65 y.o. female who presents to the clinic today for lower back pain. Patient reports a few weeks ago her pain has worsened over the last month and her leg numbness radiating along the lateral aspect of the left leg to the ankle has progressed to pain. She underwent one LESI with marked temporary improvement in her buttock pain. She is ambulating via cane and is having significant pain with walking    Review of Systems   All other systems reviewed and are negative.       Past History:    Current Outpatient Medications:     glipiZIDE (GLUCOTROL) 10 MG tablet, Take 1 tab bid, Disp: 180 tablet, Rfl: 1    busPIRone (BUSPAR) 10 MG tablet, Take 1 tab three times daily as needed, Disp: 270 tablet, Rfl: 3    rosuvastatin (CRESTOR) 20 MG tablet, TAKE ONE TABLET BY MOUTH NIGHTLY, Disp: 30 tablet, Rfl: 2    vitamin D (ERGOCALCIFEROL) 1.25 MG (23675 UT) CAPS capsule, TAKE ONE CAPSULE BY MOUTH ONCE A WEEK (EVERY 7 DAYS), Disp: 24 capsule, Rfl: 0    TRULICITY 1.5 IA/1.9KF SOPN, INJECT THE CONTENTS OF ONE SYRINGE UNDER THE SKIN ONCE WEEKLY, Disp: 6 mL, Rfl: 2    ASPIRIN LOW DOSE 81 MG EC tablet, TAKE 1 TABLET BY MOUTH ONE TIME A DAY, Disp: 30 tablet, Rfl: 2    chlorthalidone (HYGROTON) 25 MG tablet, TAKE 1 TABLET BY MOUTH ONE TIME A DAY, Disp: 90 tablet, Rfl: 1    SYNJARDY 5-1000 MG TABS, TAKE 1 TABLET BY MOUTH 2 TIMES A DAY, Disp: 180 tablet, Rfl: 2    tiZANidine (ZANAFLEX) 2 MG tablet, Take 1 tablet by mouth every 6 hours as needed (back pain), Disp: 30 tablet, Rfl: 0    omeprazole (PRILOSEC) 20 MG delayed release capsule, Take 1 capsule by mouth 2 times daily, Disp: 180 capsule, Rfl: 3    rOPINIRole (REQUIP) 2 MG tablet, TAKE 2 TABLETS BY MOUTH NIGHTLY, Disp: 180 tablet, Rfl: 3    lisinopril (PRINIVIL;ZESTRIL) 40 MG tablet, TAKE 1 TABLET BY MOUTH ONE TIME A DAY, Disp: 90 tablet, Rfl: 2    albuterol sulfate HFA (PROVENTIL HFA) 108 (90 Base) MCG/ACT inhaler, Inhale 2 puffs into the lungs every 4 hours as needed for Wheezing, Disp: 2 Inhaler, Rfl: 5    blood glucose test strips (PRODIGY NO CODING BLOOD GLUC) strip, Use to test once daily and as needed as directed by provider, Disp: 100 each, Rfl: 3    diclofenac sodium (VOLTAREN) 1 % GEL, Apply 2 g topically 2 times daily (Patient taking differently: Apply 2 g topically 2 times daily Prn pain), Disp: 100 g, Rfl: 2    acetaminophen (ACETAMINOPHEN EXTRA STRENGTH) 500 MG tablet, Take 1,000 mg by mouth daily as needed for Pain, Disp: , Rfl:     bimatoprost (LUMIGAN) 0.01 % SOLN ophthalmic drops, Place 1 drop into both eyes nightly, Disp: , Rfl:     PRODIGY LANCETS 28G MISC, 1 Bottle by Does not apply route three times daily, Disp: 100 each, Rfl: 3    Blood Glucose Monitoring Suppl (PRODIGY AUTOCODE BLOOD GLUCOSE) w/Device KIT, 1 Device by Does not apply route 3 times daily, Disp: 1 kit, Rfl: 0  Allergies   Allergen Reactions    Codeine Nausea And Vomiting     Social History     Socioeconomic History    Marital status:      Spouse name: Not on file    Number of children: Not on file    Years of education: Not on file  Highest education level: Not on file   Occupational History    Not on file   Tobacco Use    Smoking status: Never Smoker    Smokeless tobacco: Never Used   Vaping Use    Vaping Use: Never used   Substance and Sexual Activity    Alcohol use: Yes     Alcohol/week: 0.0 standard drinks     Comment: rarely/ 4 times a year    Drug use: No    Sexual activity: Yes   Other Topics Concern    Not on file   Social History Narrative    Not on file     Social Determinants of Health     Financial Resource Strain: Low Risk     Difficulty of Paying Living Expenses: Not very hard   Food Insecurity: No Food Insecurity    Worried About Running Out of Food in the Last Year: Never true    Valerie of Food in the Last Year: Never true   Transportation Needs:     Lack of Transportation (Medical): Not on file    Lack of Transportation (Non-Medical):  Not on file   Physical Activity:     Days of Exercise per Week: Not on file    Minutes of Exercise per Session: Not on file   Stress:     Feeling of Stress : Not on file   Social Connections:     Frequency of Communication with Friends and Family: Not on file    Frequency of Social Gatherings with Friends and Family: Not on file    Attends [employer] Services: Not on file    Active Member of [address] [place] [address] or Organizations: Not on file    Attends Club or Organization Meetings: Not on file    Marital Status: Not on file   Intimate Partner Violence:     Fear of Current or Ex-Partner: Not on file    Emotionally Abused: Not on file    Physically Abused: Not on file    Sexually Abused: Not on file   Housing Stability:     Unable to Pay for Housing in the Last Year: Not on file    Number of [place] in the Last Year: Not on file    Unstable Housing in the Last Year: Not on file     Past Medical History:   Diagnosis Date    Cervical spondylosis 4/2009    c-4 c-5, c-5 c-6, c-6 c-7    Generalized anxiety disorder 12/8/2020    Hyperlipidemia     Hypertension     Lumbar radiculopathy     DIEGO (nonalcoholic steatohepatitis) 10/12/2014    Obesity     Osteoarthritis of left knee 2014    Restless legs syndrome     Type II or unspecified type diabetes mellitus without mention of complication, not stated as uncontrolled      Past Surgical History:   Procedure Laterality Date     SECTION      COLONOSCOPY  2012    [facility]    HYSTERECTOMY, TOTAL ABDOMINAL  2008    Polymenorrhagia    SHOULDER SURGERY Left 2021     SHOULDER MANIPULATION WITH PRE OP INTERSCALENE BLOCK     SHOULDER SURGERY Left 2021    SHOULDER MANIPULATION WITH PRE OP INTERSCALENE BLOCK and intraop injection performed by Edd Masters DO at [address] [facility]  2012    [facility]     Family History   Problem Relation Age of Onset    Heart Attack Mother     Diabetes Mother     Diabetes Father     Heart Attack Father         Physical Exam:  Vitals signs and nursing note reviewed. Constitutional:       Appearance: well-developed. HENT:      Head: Normocephalic and atraumatic. Nose: Nose normal.   Eyes:      Conjunctiva/sclera: Conjunctivae normal.   Neck:      Musculoskeletal: Normal range of motion and neck supple. Pulmonary:      Effort: Pulmonary effort is normal. No respiratory distress. Musculoskeletal:      Comments: Normal gait     Skin:     General: Skin is warm and dry. Neurological:      Mental Status: Alert and oriented to person, place, and time. Sensory: No sensory deficit. Psychiatric:         Behavior: Behavior normal.         Thought Content: Thought content normal.    Straight leg raise positive on the left    Provider Attestation:  Johnny Jordan, personally performed the services described in this documentation. All medical record entries made by the scribe were at my direction and in my presence.  I have reviewed the chart and discharge instructions and agree that the records reflect my personal performance and is accurate and complete. Golden Mane MD 3/15/22     Scribe Attestation:  By signing my name below, Veronica Bro, attest that this documentation has been prepared under the direction and in the presence of Dr. Geneva Schulte. Electronically signed: Oseas Wray, 3/15/22     Please note that this chart was generated using voice recognition Dragon dictation software. Although every effort was made to ensure the accuracy of this automated transcription, some errors in transcription may have occurred.

## 2022-03-18 RX ORDER — LISINOPRIL 40 MG/1
40 TABLET ORAL DAILY
Qty: 90 TABLET | Refills: 2 | Status: ON HOLD | OUTPATIENT
Start: 2022-03-18 | End: 2022-06-09 | Stop reason: HOSPADM

## 2022-03-18 NOTE — TELEPHONE ENCOUNTER
Last visit: 12/16/2021  Last Med refill:   Does patient have enough medication for 72 hours: No:     Next Visit Date:  Future Appointments   Date Time Provider Nicola Zaidi   3/22/2022  7:00  [facility] MRI RM 80 [id number]  [facility] Radiolog   3/31/2022  4:10 PM Giorgio Guevara MD [id number]       Health Maintenance   Topic Date Due    Depression Screen  Never done    Shingles Vaccine (1 of 2) Never done    Diabetic retinal exam  01/12/2017    Diabetic foot exam  12/01/2017    DTaP/Tdap/Td vaccine (2 - Tdap) 12/15/2020    COVID-19 Vaccine (3 - Booster for Moderna series) 07/11/2021    Diabetic microalbuminuria test  06/11/2022    Lipid screen  06/24/2022    Potassium monitoring  07/21/2022    Creatinine monitoring  07/21/2022    A1C test (Diabetic or Prediabetic)  09/14/2022    Colorectal Cancer Screen  09/20/2022    Breast cancer screen  11/14/2022    Pneumococcal 0-64 years Vaccine (2 of 2 - PPSV23) 03/10/2026    Flu vaccine  Completed    Hepatitis C screen  Completed    HIV screen  Completed    Hepatitis A vaccine  Aged Out    Hib vaccine  Aged Out    Meningococcal (ACWY) vaccine  Aged Out       Hemoglobin A1C (%)   Date Value   09/14/2021 7.7   06/24/2021 7.9 (H)   04/20/2021 7.3             ( goal A1C is < 7)   Microalb/Crt.  Ratio (mcg/mg creat)   Date Value   06/11/2021 CANNOT BE CALCULATED     LDL Cholesterol (mg/dL)   Date Value   06/24/2021 52   04/20/2021 48       (goal LDL is <100)   AST (U/L)   Date Value   02/04/2021 18     ALT (U/L)   Date Value   02/04/2021 20     BUN (mg/dL)   Date Value   07/21/2021 30 (H)     BP Readings from Last 3 Encounters:   11/22/21 115/70   11/10/21 111/77   10/07/21 126/76          (goal 120/80)    All Future Testing planned in [facility]  Lab Frequency Next Occurrence   Basic Metabolic Panel Once 34/85/4988   Saline lock IV Once 05/10/2022   Lumbar Epidural Steroid Injection/Caudal Once 11/10/2021   MRI LUMBAR SPINE WO CONTRAST Once 03/15/2022 Patient Active Problem List:     Cervical spondylosis     Type II or unspecified type diabetes mellitus without mention of complication, not stated as uncontrolled     Hypertension     Abnormal auditory perception     Eustachian tube dysfunction     Chronic serous otitis media     Nasal polyps     Nasal congestion     Osteoarthritis of left knee     Osteoarthritis of right knee     DIEGO (nonalcoholic steatohepatitis)     Vitamin D deficiency     Iron deficiency anemia     Dyslipidemia     Diabetes mellitus type 2, uncontrolled (HCC)     Left hip pain     Trochanteric bursitis     Fatigue     Daytime somnolence     Snoring     Chronic left shoulder pain     Restless legs syndrome     Generalized anxiety disorder     Primary osteoarthritis, left shoulder     Tendinopathy of left shoulder     Adhesive capsulitis of left shoulder     Lumbar radiculopathy

## 2022-03-22 ENCOUNTER — HOSPITAL ENCOUNTER (OUTPATIENT)
Dept: MRI IMAGING | Age: 62
Discharge: HOME | End: 2022-03-24
Payer: COMMERCIAL

## 2022-03-22 DIAGNOSIS — M54.16 LUMBAR RADICULAR PAIN: ICD-10-CM

## 2022-03-22 DIAGNOSIS — M54.50 LOW BACK PAIN, UNSPECIFIED BACK PAIN LATERALITY, UNSPECIFIED CHRONICITY, UNSPECIFIED WHETHER SCIATICA PRESENT: ICD-10-CM

## 2022-03-22 PROCEDURE — 72148 MRI LUMBAR SPINE W/O DYE: CPT

## 2022-03-28 ENCOUNTER — TELEPHONE (OUTPATIENT)
Dept: FAMILY MEDICINE CLINIC | Age: 62
End: 2022-03-28

## 2022-03-28 RX ORDER — CYCLOBENZAPRINE HCL 5 MG
5 TABLET ORAL 2 TIMES DAILY PRN
Qty: 10 TABLET | Refills: 0 | Status: SHIPPED | OUTPATIENT
Start: 2022-03-28 | End: 2022-04-07

## 2022-03-28 NOTE — TELEPHONE ENCOUNTER
Patient calling crying stating that Dr Stevan Ramos  already knows the issue she has with her sciatica, but today it is way worse, she can not even put pressure to walk on her RT side - she states she can barely make it to her bathroom. She is asking if she can get flexeril sent to her pharmacy - she is unable to come in due to being unable to get any where due to her pain.

## 2022-03-31 ENCOUNTER — OFFICE VISIT (OUTPATIENT)
Dept: ORTHOPEDIC SURGERY | Age: 62
End: 2022-03-31
Payer: COMMERCIAL

## 2022-03-31 VITALS — HEIGHT: 63 IN | BODY MASS INDEX: 35.08 KG/M2 | WEIGHT: 198 LBS | RESPIRATION RATE: 16 BRPM

## 2022-03-31 DIAGNOSIS — M54.50 LOW BACK PAIN, UNSPECIFIED BACK PAIN LATERALITY, UNSPECIFIED CHRONICITY, UNSPECIFIED WHETHER SCIATICA PRESENT: ICD-10-CM

## 2022-03-31 DIAGNOSIS — M54.16 LUMBAR RADICULAR PAIN: Primary | ICD-10-CM

## 2022-03-31 PROCEDURE — 99213 OFFICE O/P EST LOW 20 MIN: CPT | Performed by: ORTHOPAEDIC SURGERY

## 2022-03-31 RX ORDER — GABAPENTIN 300 MG/1
300 CAPSULE ORAL 3 TIMES DAILY
Qty: 90 CAPSULE | Refills: 3 | Status: ON HOLD | OUTPATIENT
Start: 2022-03-31 | End: 2022-06-09 | Stop reason: HOSPADM

## 2022-03-31 NOTE — PROGRESS NOTES
Patient ID: Marky Cummings is a 65 y.o. female    Chief Compliant:  Chief Complaint   Patient presents with    Back Pain        Diagnostic imaging:    New MRI lumbar spine is reviewed some minor L5-S1 degenerative changes no significant nerve impingement disc herniation stenosis    Assessment and Plan:  1. Lumbar radicular pain    2. Low back pain, unspecified back pain laterality, unspecified chronicity, unspecified whether sciatica present      MRI cervical and thoracic spine     Refer to pain management for lumbar epidural steroid injections    Neurontin    Follow up after MRIs    HPI:  This is a 65 y.o. female who presents to the clinic today for MRI results. Patient reports ongoing severe left radicular leg pain and numbness along the lateral aspect of the leg to the ankle. Patient notes severe left ankle pain. Her first LESI was with Dr. Danuta Loyola    Patient notes it is very difficult for her to walk and she has been essentially bed ridden due to her pain. She is ambulating via cane    Review of Systems   All other systems reviewed and are negative.       Past History:    Current Outpatient Medications:     cyclobenzaprine (FLEXERIL) 5 MG tablet, Take 1 tablet by mouth 2 times daily as needed for Muscle spasms, Disp: 10 tablet, Rfl: 0    lisinopril (PRINIVIL;ZESTRIL) 40 MG tablet, Take 1 tablet by mouth daily, Disp: 90 tablet, Rfl: 2    glipiZIDE (GLUCOTROL) 10 MG tablet, Take 1 tab bid, Disp: 180 tablet, Rfl: 1    busPIRone (BUSPAR) 10 MG tablet, Take 1 tab three times daily as needed, Disp: 270 tablet, Rfl: 3    rosuvastatin (CRESTOR) 20 MG tablet, TAKE ONE TABLET BY MOUTH NIGHTLY, Disp: 30 tablet, Rfl: 2    vitamin D (ERGOCALCIFEROL) 1.25 MG (71257 UT) CAPS capsule, TAKE ONE CAPSULE BY MOUTH ONCE A WEEK (EVERY 7 DAYS), Disp: 24 capsule, Rfl: 0    TRULICITY 1.5 LM/1.5CN SOPN, INJECT THE CONTENTS OF ONE SYRINGE UNDER THE SKIN ONCE WEEKLY, Disp: 6 mL, Rfl: 2    ASPIRIN LOW DOSE 81 MG EC tablet, TAKE 1 TABLET BY MOUTH ONE TIME A DAY, Disp: 30 tablet, Rfl: 2    chlorthalidone (HYGROTON) 25 MG tablet, TAKE 1 TABLET BY MOUTH ONE TIME A DAY, Disp: 90 tablet, Rfl: 1    SYNJARDY 5-1000 MG TABS, TAKE 1 TABLET BY MOUTH 2 TIMES A DAY, Disp: 180 tablet, Rfl: 2    omeprazole (PRILOSEC) 20 MG delayed release capsule, Take 1 capsule by mouth 2 times daily, Disp: 180 capsule, Rfl: 3    rOPINIRole (REQUIP) 2 MG tablet, TAKE 2 TABLETS BY MOUTH NIGHTLY, Disp: 180 tablet, Rfl: 3    albuterol sulfate HFA (PROVENTIL HFA) 108 (90 Base) MCG/ACT inhaler, Inhale 2 puffs into the lungs every 4 hours as needed for Wheezing, Disp: 2 Inhaler, Rfl: 5    blood glucose test strips (PRODIGY NO CODING BLOOD GLUC) strip, Use to test once daily and as needed as directed by provider, Disp: 100 each, Rfl: 3    diclofenac sodium (VOLTAREN) 1 % GEL, Apply 2 g topically 2 times daily (Patient taking differently: Apply 2 g topically 2 times daily Prn pain), Disp: 100 g, Rfl: 2    acetaminophen (ACETAMINOPHEN EXTRA STRENGTH) 500 MG tablet, Take 1,000 mg by mouth daily as needed for Pain, Disp: , Rfl:     bimatoprost (LUMIGAN) 0.01 % SOLN ophthalmic drops, Place 1 drop into both eyes nightly, Disp: , Rfl:     PRODIGY LANCETS 28G MISC, 1 Bottle by Does not apply route three times daily, Disp: 100 each, Rfl: 3    Blood Glucose Monitoring Suppl (PRODIGY AUTOCODE BLOOD GLUCOSE) w/Device KIT, 1 Device by Does not apply route 3 times daily, Disp: 1 kit, Rfl: 0  Allergies   Allergen Reactions    Codeine Nausea And Vomiting     Social History     Socioeconomic History    Marital status:      Spouse name: Not on file    Number of children: Not on file    Years of education: Not on file    Highest education level: Not on file   Occupational History    Not on file   Tobacco Use    Smoking status: Never Smoker    Smokeless tobacco: Never Used   Vaping Use    Vaping Use: Never used   Substance and Sexual Activity    Alcohol use: Yes     Alcohol/week: 0.0 standard drinks     Comment: rarely/ 4 times a year    Drug use: No    Sexual activity: Yes   Other Topics Concern    Not on file   Social History Narrative    Not on file     Social Determinants of Health     Financial Resource Strain: Low Risk     Difficulty of Paying Living Expenses: Not very hard   Food Insecurity: No Food Insecurity    Worried About Running Out of Food in the Last Year: Never true    Valerie of Food in the Last Year: Never true   Transportation Needs:     Lack of Transportation (Medical): Not on file    Lack of Transportation (Non-Medical):  Not on file   Physical Activity:     Days of Exercise per Week: Not on file    Minutes of Exercise per Session: Not on file   Stress:     Feeling of Stress : Not on file   Social Connections:     Frequency of Communication with Friends and Family: Not on file    Frequency of Social Gatherings with Friends and Family: Not on file    Attends Jew Services: Not on file    Active Member of [address] [place] [address] or Organizations: Not on file    Attends Club or Organization Meetings: Not on file    Marital Status: Not on file   Intimate Partner Violence:     Fear of Current or Ex-Partner: Not on file    Emotionally Abused: Not on file    Physically Abused: Not on file    Sexually Abused: Not on file   Housing Stability:     Unable to Pay for Housing in the Last Year: Not on file    Number of [place] in the Last Year: Not on file    Unstable Housing in the Last Year: Not on file     Past Medical History:   Diagnosis Date    Cervical spondylosis 4/2009    c-4 c-5, c-5 c-6, c-6 c-7    Generalized anxiety disorder 12/8/2020    Hyperlipidemia     Hypertension     Lumbar radiculopathy     DIEGO (nonalcoholic steatohepatitis) 10/12/2014    Obesity     Osteoarthritis of left knee 8/19/2014    Restless legs syndrome     Type II or unspecified type diabetes mellitus without mention of complication, not stated as uncontrolled      Past Surgical History:   Procedure Laterality Date     SECTION      [facility]    HYSTERECTOMY, TOTAL ABDOMINAL  2008    Polymenorrhagia    SHOULDER SURGERY Left 2021     SHOULDER MANIPULATION WITH PRE OP INTERSCALENE BLOCK     SHOULDER SURGERY Left 2021    SHOULDER MANIPULATION WITH PRE OP INTERSCALENE BLOCK and intraop injection performed by Brenda Darnell DO at [facility]  2012    [facility]     Family History   Problem Relation Age of Onset    Heart Attack Mother     Diabetes Mother     Diabetes Father     Heart Attack Father         Physical Exam:  Vitals signs and nursing note reviewed. Constitutional:       Appearance: well-developed. HENT:      Head: Normocephalic and atraumatic. Nose: Nose normal.   Eyes:      Conjunctiva/sclera: Conjunctivae normal.   Neck:      Musculoskeletal: Normal range of motion and neck supple. Pulmonary:      Effort: Pulmonary effort is normal. No respiratory distress. Musculoskeletal:      Comments: Normal gait     Skin:     General: Skin is warm and dry. Neurological:      Mental Status: Alert and oriented to person, place, and time. Sensory: No sensory deficit. Psychiatric:         Behavior: Behavior normal.         Thought Content: Thought content normal.    Patient tearful and crying throughout clinic visit    Ambulates with a cane with very short stride wide-based Gault gait    Upper and lower extremity DTRs 3+ symmetrical and in tact    Negative Boyer, negative Clonus    Decreased sensation over the lateral left S1 nerve root distribution lateral shin lateral foot        Provider Attestation:  Quiana Jordan, personally performed the services described in this documentation. All medical record entries made by the scribe were at my direction and in my presence.  I have reviewed the chart and discharge instructions and agree that the records reflect my personal performance and is accurate and complete. Carlin Betancourt MD 3/31/22     Scribe Attestation:  By signing my name below, Aditi Cristina, attest that this documentation has been prepared under the direction and in the presence of Dr. Wolfgang Harada. Electronically signed: Oseas Manley, 3/31/22     Please note that this chart was generated using voice recognition Dragon dictation software. Although every effort was made to ensure the accuracy of this automated transcription, some errors in transcription may have occurred.

## 2022-04-04 ENCOUNTER — OFFICE VISIT (OUTPATIENT)
Dept: FAMILY MEDICINE CLINIC | Age: 62
End: 2022-04-04
Payer: COMMERCIAL

## 2022-04-04 VITALS
HEIGHT: 63 IN | HEART RATE: 98 BPM | SYSTOLIC BLOOD PRESSURE: 121 MMHG | BODY MASS INDEX: 35.44 KG/M2 | DIASTOLIC BLOOD PRESSURE: 75 MMHG | TEMPERATURE: 97.1 F | WEIGHT: 200 LBS

## 2022-04-04 DIAGNOSIS — M54.16 LUMBAR RADICULOPATHY: Primary | ICD-10-CM

## 2022-04-04 PROCEDURE — 6360000002 HC RX W HCPCS

## 2022-04-04 PROCEDURE — 99213 OFFICE O/P EST LOW 20 MIN: CPT

## 2022-04-04 RX ORDER — KETOROLAC TROMETHAMINE 30 MG/ML
30 INJECTION, SOLUTION INTRAMUSCULAR; INTRAVENOUS ONCE
Status: COMPLETED | OUTPATIENT
Start: 2022-04-04 | End: 2022-04-04

## 2022-04-04 RX ORDER — KETOROLAC TROMETHAMINE 10 MG/1
10 TABLET, FILM COATED ORAL EVERY 6 HOURS PRN
Qty: 20 TABLET | Refills: 0 | Status: SHIPPED | OUTPATIENT
Start: 2022-04-04 | End: 2022-05-20

## 2022-04-04 RX ORDER — KETOROLAC TROMETHAMINE 30 MG/ML
30 INJECTION, SOLUTION INTRAMUSCULAR; INTRAVENOUS ONCE
Qty: 1 ML | Refills: 0
Start: 2022-04-04 | End: 2022-04-04

## 2022-04-04 RX ORDER — KETOROLAC TROMETHAMINE 30 MG/ML
30 INJECTION, SOLUTION INTRAMUSCULAR; INTRAVENOUS ONCE
Qty: 1 ML | Refills: 0 | Status: SHIPPED | OUTPATIENT
Start: 2022-04-04 | End: 2022-05-20

## 2022-04-04 RX ADMIN — KETOROLAC TROMETHAMINE 30 MG: 30 INJECTION, SOLUTION INTRAMUSCULAR; INTRAVENOUS at 11:37

## 2022-04-04 RX ADMIN — KETOROLAC TROMETHAMINE 30 MG: 30 INJECTION, SOLUTION INTRAMUSCULAR; INTRAVENOUS at 11:32

## 2022-04-04 ASSESSMENT — PATIENT HEALTH QUESTIONNAIRE - PHQ9
2. FEELING DOWN, DEPRESSED OR HOPELESS: 0
SUM OF ALL RESPONSES TO PHQ QUESTIONS 1-9: 0
DEPRESSION UNABLE TO ASSESS: PT REFUSES
SUM OF ALL RESPONSES TO PHQ9 QUESTIONS 1 & 2: 0
1. LITTLE INTEREST OR PLEASURE IN DOING THINGS: 0

## 2022-04-04 ASSESSMENT — ENCOUNTER SYMPTOMS: BACK PAIN: 1

## 2022-04-04 NOTE — PROGRESS NOTES
Attending Physician Statement  I have discussed the care off. First name Rony pertinent history and exam findings,  with the resident. I have seen and examined the patient and the key elements of all parts of the encounter have been performed by me. I agree with the assessment, plan and orders as documented by the resident. (GC Modifier)    LBP acute on chronic LBP- seen Ortho/RI- mild DJD Lumbar spine. Pain management referral/Gabapentin advised  Patient is tearful c/o severe pain/  Toradol shot 60 mg given.  Patient reported some relief of pain Neurology referral done

## 2022-04-04 NOTE — PROGRESS NOTES
Visit Information    Have you changed or started any medications since your last visit including any over-the-counter medicines, vitamins, or herbal medicines? no   Have you stopped taking any of your medications? Is so, why? -  no  Are you having any side effects from any of your medications? - no    Have you seen any other physician or provider since your last visit? yes - Ortho   Have you had any other diagnostic tests since your last visit? yes - XR, MRI   Have you been seen in the emergency room and/or had an admission in a hospital since we last saw you?  no   Have you had your routine dental cleaning in the past 6 months?  no     Do you have an active MyChart account? If no, what is the barrier?   Yes    Patient Care Team:  Harley Hendrix MD as PCP - General (Family Medicine)  Harley Hendrix MD as PCP - [facility] Provider  Mouna Echeverria MD as Consulting Physician (Gastroenterology)  Timbo Roberts RN as [address] [place] [address] History Review  Past Medical, Family, and Social History reviewed and does not contribute to the patient presenting condition    Health Maintenance   Topic Date Due    Shingles Vaccine (1 of 2) Never done    Diabetic retinal exam  01/12/2017    Diabetic foot exam  12/01/2017    DTaP/Tdap/Td vaccine (2 - Tdap) 12/15/2020    COVID-19 Vaccine (3 - Booster for Ellan Sox series) 07/11/2021    Depression Screen  01/21/2022    Diabetic microalbuminuria test  06/11/2022    Lipid screen  06/24/2022    Potassium monitoring  07/21/2022    Creatinine monitoring  07/21/2022    A1C test (Diabetic or Prediabetic)  09/14/2022    Colorectal Cancer Screen  09/20/2022    Breast cancer screen  11/14/2022    Pneumococcal 0-64 years Vaccine (2 of 2 - PPSV23) 03/10/2026    Flu vaccine  Completed    Hepatitis C screen  Completed    HIV screen  Completed    Hepatitis A vaccine  Aged Out    Hib vaccine  Aged Out    Meningococcal (ACWY) vaccine  Aged Out

## 2022-04-04 NOTE — PATIENT INSTRUCTIONS
Thank you for letting us take care of you today. We hope all your questions were addressed. If a question was overlooked or something else comes to mind after you return home, please contact a member of your Care Team listed below. Your Care Team at [place] [address] is Team #5  Dagmar Pro MD (Faculty)  Freedom Mars MD (Resident)  Sri Wolfe MD (Resident)  Aria Vargas MD (Resident)  Wendi Hoffman MD (Resident)  Ambar Sandoval., [facility]  Naomi Levy., SHERRY Bowers., Francesco Landry., [facility] office)  Radha Marcial, [address] (Clinical Practice Manager)  [facility] (Clinical Pharmacist)       Office phone number: [phone]    If you need to get in right away due to illness, please be advised we have \"Same Day\" appointments available Monday-Friday. Please call us at [phone] option #3 to schedule your \"Same Day\" appointment.

## 2022-04-04 NOTE — PROGRESS NOTES
[address] Residency Program - Outpatient Note        Subjective:    Deanne Renteria is a 65 y.o. female with  has a past medical history of Cervical spondylosis, Generalized anxiety disorder, Hyperlipidemia, Hypertension, Lumbar radiculopathy, DIEGO (nonalcoholic steatohepatitis), Obesity, Osteoarthritis of left knee, Restless legs syndrome, and Type II or unspecified type diabetes mellitus without mention of complication, not stated as uncontrolled. Presented to the office today for:  Chief Complaint   Patient presents with    Hip Pain     lower back all the down hip to foot       HPI    Patient is here for worsening left hip, low back, and leg pain. Initial onset of pain was 1 year ago but has been acutely worsening over the past week. Pain is now a 9 out of 10 intensity and is affecting ADLs; affecting patient's ability to walk or sleep. Patient seen by orthopedics and pain management in the past.  Pain is intractable with no improvement with previous courses of therapy, different pain medication including over-the-counter medications and gabapentin, or steroids. Gabapentin started by orthopedics last week; no improvement seen as of yet. Mild improvement seen in past with aquatic therapy and Flexeril. Recent imaging reviewed and remarkable for mild degenerative changes with spinal stenosis of L3-L4 but no other acute processes seen. Patient has associated numbness in the lateral leg from thigh to ankle. She has also started seeing a chiropractor x2 encounters. Denies any recent trauma or falls. No recent illnesses other than Covid last year. Review of Systems   Constitutional: Negative for chills and fever. Musculoskeletal: Positive for arthralgias and back pain. Neurological: Positive for weakness and numbness. Psychiatric/Behavioral: Positive for sleep disturbance.              The patient has a   Family History   Problem Relation Age of Onset    Heart Attack Mother     Diabetes Mother     Diabetes Father     Heart Attack Father        Objective:    /75 (Site: Left Upper Arm, Position: Sitting, Cuff Size: Large Adult)   Pulse 98   Temp 97.1 °F (36.2 °C) (Temporal)   Ht 5' 2.99\" (1.6 m)   Wt 200 lb (90.7 kg)   BMI 35.44 kg/m²    BP Readings from Last 3 Encounters:   04/04/22 121/75   11/22/21 115/70   11/10/21 111/77       Physical Exam  Constitutional:       Appearance: She is obese. Comments: Pt is tearful and appears uncomfortable   Eyes:      Conjunctiva/sclera: Conjunctivae normal.   Cardiovascular:      Rate and Rhythm: Normal rate. Pulmonary:      Effort: Pulmonary effort is normal. No respiratory distress. Musculoskeletal:         General: Tenderness present. Comments: Positive straight leg test on the left lower extremity,  Positive femoral stretch test on the left lower extremity,  Tenderness to palpation of PSIS bilaterally,  No tenderness to palpation of spinal processes at any level,  No edema or erythema spine noted. Tenderness to palpation at sacral areas. Neurological:      Mental Status: She is alert. Sensory: Sensory deficit (left lateral LE proximal and distal) present. Motor: Weakness (Left lower extremity distal and proximal muscles) present.       Gait: Gait abnormal.      Deep Tendon Reflexes: Reflexes normal.      Comments: Mild left foot drop         Lab Results   Component Value Date    WBC 10.2 07/21/2021    HGB 11.1 (L) 07/21/2021    HCT 37.1 07/21/2021     07/21/2021    CHOL 154 06/24/2021    TRIG 217 (H) 06/24/2021    HDL 59 06/24/2021    LDLDIRECT 156 (H) 05/20/2017    ALT 20 02/04/2021    AST 18 02/04/2021     07/21/2021    K 4.6 07/21/2021     07/21/2021    CREATININE 1.01 (H) 07/21/2021    BUN 30 (H) 07/21/2021    CO2 23 07/21/2021    TSH 2.25 08/04/2020    INR 1.0 07/23/2013    GLUF 262 (H) 09/10/2018    LABA1C 7.7 09/14/2021    LABMICR CANNOT BE CALCULATED 06/11/2021     Lab Results   Component Value Date    CALCIUM 9.1 07/21/2021    PHOS 3.1 07/21/2021     Lab Results   Component Value Date    LDLCHOLESTEROL 52 06/24/2021    LDLDIRECT 156 (H) 05/20/2017       Assessment and Plan:    1. Lumbar radiculopathy    - L3 radiculopathy per recent MRI  - Intractable pain with disturbance of gait and ADL's  - Patient to see OP Neurology today or tomorrow. If pain worsens prior to seeing Neurologist, patient to go to ED  - [facility], Neurology, [place] => appointment made for Wednesday  - ketorolac (TORADOL) 10 MG tablet; Take 1 tablet by mouth every 6 hours as needed for Pain  Dispense: 20 tablet; Refill: 0  - ketorolac (TORADOL) 30 MG/ML injection; Inject 1 mL into the muscle once for 1 dose  Dispense: 1 mL; Refill: 0 - done in office  - [facility]'s          Requested Prescriptions     Signed Prescriptions Disp Refills    ketorolac (TORADOL) 10 MG tablet 20 tablet 0     Sig: Take 1 tablet by mouth every 6 hours as needed for Pain    ketorolac (TORADOL) 30 MG/ML injection 1 mL 0     Sig: Inject 1 mL into the muscle once for 1 dose       Medications Discontinued During This Encounter   Medication Reason    ketorolac (TORADOL) 30 MG/ML injection        Kavya received counseling on the following healthy behaviors: nutrition, exercise and medication adherence    Discussed use,benefit, and side effects of prescribed medications. Barriers to medication compliance addressed. All patient questions answered. Pt voiced understanding. No follow-ups on file. Disclaimer: Some orall of this note was transcribed using voice-recognition software. This may cause typographical errors occasionally. Although all effort is made to fix these errors, please do not hesitate to contact our office if there Nikolas Broach concern with the understanding of this note.

## 2022-04-05 NOTE — TELEPHONE ENCOUNTER
Last visit: 4/4/22  Last Med refill: 3/28/22  Does patient have enough medication for 72 hours: Yes    Next Visit Date:  Future Appointments   Date Time Provider Nicola Zaidi   4/6/2022 12:30 PM Omkar Recinos MD Neuro [address] Maintenance   Topic Date Due    Shingles Vaccine (1 of 2) Never done    Diabetic retinal exam  01/12/2017    Diabetic foot exam  12/01/2017    DTaP/Tdap/Td vaccine (2 - Tdap) 12/15/2020    COVID-19 Vaccine (3 - Booster for Moderna series) 07/11/2021    Diabetic microalbuminuria test  06/11/2022    Lipid screen  06/24/2022    Potassium monitoring  07/21/2022    Creatinine monitoring  07/21/2022    A1C test (Diabetic or Prediabetic)  09/14/2022    Colorectal Cancer Screen  09/20/2022    Breast cancer screen  11/14/2022    Depression Screen  04/04/2023    Pneumococcal 0-64 years Vaccine (2 of 2 - PPSV23) 03/10/2026    Flu vaccine  Completed    Hepatitis C screen  Completed    HIV screen  Completed    Hepatitis A vaccine  Aged Out    Hib vaccine  Aged Out    Meningococcal (ACWY) vaccine  Aged Out       Hemoglobin A1C (%)   Date Value   09/14/2021 7.7   06/24/2021 7.9 (H)   04/20/2021 7.3             ( goal A1C is < 7)   Microalb/Crt.  Ratio (mcg/mg creat)   Date Value   06/11/2021 CANNOT BE CALCULATED     LDL Cholesterol (mg/dL)   Date Value   06/24/2021 52   04/20/2021 48       (goal LDL is <100)   AST (U/L)   Date Value   02/04/2021 18     ALT (U/L)   Date Value   02/04/2021 20     BUN (mg/dL)   Date Value   07/21/2021 30 (H)     BP Readings from Last 3 Encounters:   04/04/22 121/75   11/22/21 115/70   11/10/21 111/77          (goal 120/80)    All Future Testing planned in [facility]  Lab Frequency Next Occurrence   Basic Metabolic Panel Once 81/26/2656   Saline lock IV Once 05/10/2022   Lumbar Epidural Steroid Injection/Caudal Once 11/10/2021   MRI CERVICAL SPINE WO CONTRAST Once 03/31/2022   MRI THORACIC SPINE WO CONTRAST Once 03/31/2022 Patient Active Problem List:     Cervical spondylosis     Type II or unspecified type diabetes mellitus without mention of complication, not stated as uncontrolled     Hypertension     Abnormal auditory perception     Eustachian tube dysfunction     Chronic serous otitis media     Nasal polyps     Nasal congestion     Osteoarthritis of left knee     Osteoarthritis of right knee     DIEGO (nonalcoholic steatohepatitis)     Vitamin D deficiency     Iron deficiency anemia     Dyslipidemia     Diabetes mellitus type 2, uncontrolled (HCC)     Left hip pain     Trochanteric bursitis     Fatigue     Daytime somnolence     Snoring     Chronic left shoulder pain     Restless legs syndrome     Generalized anxiety disorder     Primary osteoarthritis, left shoulder     Tendinopathy of left shoulder     Adhesive capsulitis of left shoulder     Lumbar radiculopathy

## 2022-04-06 ENCOUNTER — OFFICE VISIT (OUTPATIENT)
Dept: NEUROLOGY | Age: 62
End: 2022-04-06
Payer: COMMERCIAL

## 2022-04-06 VITALS
WEIGHT: 200 LBS | HEIGHT: 62 IN | BODY MASS INDEX: 36.8 KG/M2 | SYSTOLIC BLOOD PRESSURE: 96 MMHG | OXYGEN SATURATION: 99 % | DIASTOLIC BLOOD PRESSURE: 66 MMHG | HEART RATE: 68 BPM

## 2022-04-06 DIAGNOSIS — M62.838 MUSCLE SPASMS OF BOTH LOWER EXTREMITIES: ICD-10-CM

## 2022-04-06 DIAGNOSIS — M54.16 LUMBAR RADICULOPATHY: Primary | ICD-10-CM

## 2022-04-06 PROCEDURE — 99204 OFFICE O/P NEW MOD 45 MIN: CPT | Performed by: STUDENT IN AN ORGANIZED HEALTH CARE EDUCATION/TRAINING PROGRAM

## 2022-04-06 RX ORDER — TIZANIDINE 2 MG/1
2 TABLET ORAL 2 TIMES DAILY PRN
Qty: 10 TABLET | Refills: 0 | Status: SHIPPED | OUTPATIENT
Start: 2022-04-06 | End: 2022-04-06 | Stop reason: SDUPTHER

## 2022-04-06 RX ORDER — TIZANIDINE 2 MG/1
TABLET ORAL
Qty: 60 TABLET | Refills: 1 | Status: ON HOLD | OUTPATIENT
Start: 2022-04-06 | End: 2022-06-09 | Stop reason: HOSPADM

## 2022-04-06 RX ORDER — GABAPENTIN 100 MG/1
CAPSULE ORAL
Qty: 60 CAPSULE | Refills: 3 | Status: ON HOLD | OUTPATIENT
Start: 2022-04-06 | End: 2022-06-09 | Stop reason: HOSPADM

## 2022-04-06 RX ORDER — GABAPENTIN 100 MG/1
100 CAPSULE ORAL 2 TIMES DAILY
Qty: 120 CAPSULE | Refills: 0 | Status: SHIPPED | OUTPATIENT
Start: 2022-04-06 | End: 2022-04-06 | Stop reason: CLARIF

## 2022-04-06 RX ORDER — ASPIRIN 81 MG/1
TABLET, COATED ORAL
Qty: 30 TABLET | Refills: 2 | Status: SHIPPED | OUTPATIENT
Start: 2022-04-06 | End: 2022-08-10

## 2022-04-06 RX ORDER — CYCLOBENZAPRINE HCL 5 MG
5 TABLET ORAL 2 TIMES DAILY PRN
Qty: 10 TABLET | Refills: 0 | OUTPATIENT
Start: 2022-04-06 | End: 2022-04-16

## 2022-04-06 ASSESSMENT — ENCOUNTER SYMPTOMS
BACK PAIN: 1
GASTROINTESTINAL NEGATIVE: 1
RESPIRATORY NEGATIVE: 1

## 2022-04-06 NOTE — PROGRESS NOTES
Attending Physician Statement  I have discussed the case of Amarilys Jones including pertinent history and exam findings with the resident/ CNP. I have seen and examined the patient and the key elements of the encounter have been performed by me. I agree with the assessment, plan and orders as documented by the resident or CNP with changes made to the note. Impression and Plan: Ms. Amarilys Jones is a 65 y.o. female with   ~ 1 year history of low backache extending down along left lateral thigh and left lateral leg extending to dorsal aspect of left foot; gets aggravated with cough and sneeze; but denied bladder and bowel dysfunction. MRI lumbar spine demonstrating mild degenerative changes with no significant stenosis. MRI Lumbar spine images findings reviewed with the patient. As patient has noticed moderate improvement with the gabapentin 300 tid; will slightly increase the dose by 100 mg for optimal relief. We will also start her on tizanidine 2 mg twice daily to be taken in evening and bedtime for relief of spasms. Also advised her physical therapy. Patient has not kept her follow-up appointments with the pain clinic for epidural injection therapy. Follow-up visit in 2 months or sooner for further questions and concerns. I personally spent total of 45  minutes with > 50% of time spent face-to-face, counseling, reviewing labs and images with residents; coordinating care and examining the patient.   Rajeev Clemente MD 4/6/2022 2:13 PM

## 2022-04-06 NOTE — PROGRESS NOTES
[address] [place] [address] # [address] [id number]  Dept: [phone]  Dept Fax: [phone]    NEUROLOGY NEW PATIENT NOTE       PATIENT NAME: Damien Bradley  PATIENT MRN: [id number]  PRIMARY CARE PHYSICIAN: Kenney Wright MD      HPI:        First neuro visit note:    PMH of Cervical spondylosis, Generalized anxiety disorder, Hyperlipidemia, Hypertension, Lumbar radiculopathy, DIEGO (nonalcoholic steatohepatitis), Obesity, Osteoarthritis of left knee, Restless legs syndrome, and Type II or unspecified type diabetes mellitus without mention of complication    65 female, referred from family medicine clinic this week; was tearing in pain; had left hip, low back, and leg pain. Initial onset of pain was 1 year ago but has been acutely worsening over the past week. Pain is was 9 out of 10 and is affecting ADLs; affecting patient's ability to walk or sleep. Had toradol injection at the family medicine office which partially helped with her pain    Patient was seen by orthopedics and pain management in the past; had epidural lumbar injection back in November 2021 and worked with physical therapy but reported partial relief of her symptoms     She now describes a low back pain that is more on the left side radiating down to the posterior aspect of the thigh down to the knee then travels laterally over the tibia down to the big toe associated with numbness. Constant. Partial relief with leaning forward and toradol . Sharp and stabbing in nature, increased by lying on the left side. Now she's using a cane for ambulation. She tried over-the-counter medications and steroids in the past with no relief. Mild improvement seen in past with aquatic therapy and Flexeril. She was started on gabapentin by orthopedics last week; 300 mg 3 times daily with no improvement seen as of yet.      No red flags; no weight loss, no fever, no loss of appetite, no bladder or bowel incontinence      Recent imaging reviewed and remarkable for mild degenerative changes with spinal stenosis of L3-L4, ligamentum flavum hypertrophy with face joint hypertrophy and paraspinal multifidus muscle atrophy. PREVIOUS WORKUP:     Lab Results   Component Value Date    WBC 10.2 2021    HGB 11.1 (L) 2021    HCT 37.1 2021    MCV 92.8 2021     2021       Past Medical History:   Diagnosis Date    Cervical spondylosis 2009    c-4 c-5, c-5 c-6, c-6 c-7    Generalized anxiety disorder 2020    Hyperlipidemia     Hypertension     Lumbar radiculopathy     DIEGO (nonalcoholic steatohepatitis) 10/12/2014    Obesity     Osteoarthritis of left knee 2014    Restless legs syndrome     Type II or unspecified type diabetes mellitus without mention of complication, not stated as uncontrolled         Past Surgical History:   Procedure Laterality Date     SECTION      COLONOSCOPY  2012    [facility]    HYSTERECTOMY, TOTAL ABDOMINAL  2008    Polymenorrhagia    SHOULDER SURGERY Left 2021     SHOULDER MANIPULATION WITH PRE OP INTERSCALENE BLOCK     SHOULDER SURGERY Left 2021    SHOULDER MANIPULATION WITH PRE OP INTERSCALENE BLOCK and intraop injection performed by Zbigniew Palomo DO at [address] [facility]  2012    [facility]        Social History     Socioeconomic History    Marital status:      Spouse name: Not on file    Number of children: Not on file    Years of education: Not on file    Highest education level: Not on file   Occupational History    Not on file   Tobacco Use    Smoking status: Never Smoker    Smokeless tobacco: Never Used   Vaping Use    Vaping Use: Never used   Substance and Sexual Activity    Alcohol use:  Yes     Alcohol/week: 0.0 standard drinks     Comment: rarely/ 4 times a year    Drug use: No    Sexual activity: Yes   Other Topics Concern    Not on file   Social History Narrative    Not on file     Social Determinants of Health     Financial Resource Strain: Low Risk     Difficulty of Paying Living Expenses: Not very hard   Food Insecurity: No Food Insecurity    Worried About Running Out of Food in the Last Year: Never true    Valerie of Food in the Last Year: Never true   Transportation Needs:     Lack of Transportation (Medical): Not on file    Lack of Transportation (Non-Medical): Not on file   Physical Activity:     Days of Exercise per Week: Not on file    Minutes of Exercise per Session: Not on file   Stress:     Feeling of Stress : Not on file   Social Connections:     Frequency of Communication with Friends and Family: Not on file    Frequency of Social Gatherings with Friends and Family: Not on file    Attends Christian Services: Not on file    Active Member of [address] [place] [address] or Organizations: Not on file    Attends Club or Organization Meetings: Not on file    Marital Status: Not on file   Intimate Partner Violence:     Fear of Current or Ex-Partner: Not on file    Emotionally Abused: Not on file    Physically Abused: Not on file    Sexually Abused: Not on file   Housing Stability:     Unable to Pay for Housing in the Last Year: Not on file    Number of [place] in the Last Year: Not on file    Unstable Housing in the Last Year: Not on file        Current Outpatient Medications   Medication Sig Dispense Refill    ketorolac (TORADOL) 10 MG tablet Take 1 tablet by mouth every 6 hours as needed for Pain 20 tablet 0    gabapentin (NEURONTIN) 300 MG capsule Take 1 capsule by mouth 3 times daily for 30 days.  90 capsule 3    cyclobenzaprine (FLEXERIL) 5 MG tablet Take 1 tablet by mouth 2 times daily as needed for Muscle spasms 10 tablet 0    lisinopril (PRINIVIL;ZESTRIL) 40 MG tablet Take 1 tablet by mouth daily 90 tablet 2    glipiZIDE (GLUCOTROL) 10 MG tablet Take 1 tab bid 180 tablet 1    busPIRone (BUSPAR) 10 MG tablet Take 1 tab three times daily as needed 270 tablet 3    rosuvastatin (CRESTOR) 20 MG tablet TAKE ONE TABLET BY MOUTH NIGHTLY 30 tablet 2    vitamin D (ERGOCALCIFEROL) 1.25 MG (06570 UT) CAPS capsule TAKE ONE CAPSULE BY MOUTH ONCE A WEEK (EVERY 7 DAYS) 24 capsule 0    TRULICITY 1.5 CF/8.2ZP SOPN INJECT THE CONTENTS OF ONE SYRINGE UNDER THE SKIN ONCE WEEKLY 6 mL 2    ASPIRIN LOW DOSE 81 MG EC tablet TAKE 1 TABLET BY MOUTH ONE TIME A DAY 30 tablet 2    chlorthalidone (HYGROTON) 25 MG tablet TAKE 1 TABLET BY MOUTH ONE TIME A DAY 90 tablet 1    SYNJARDY 5-1000 MG TABS TAKE 1 TABLET BY MOUTH 2 TIMES A  tablet 2    omeprazole (PRILOSEC) 20 MG delayed release capsule Take 1 capsule by mouth 2 times daily 180 capsule 3    rOPINIRole (REQUIP) 2 MG tablet TAKE 2 TABLETS BY MOUTH NIGHTLY 180 tablet 3    albuterol sulfate HFA (PROVENTIL HFA) 108 (90 Base) MCG/ACT inhaler Inhale 2 puffs into the lungs every 4 hours as needed for Wheezing 2 Inhaler 5    blood glucose test strips (PRODIGY NO CODING BLOOD GLUC) strip Use to test once daily and as needed as directed by provider 100 each 3    diclofenac sodium (VOLTAREN) 1 % GEL Apply 2 g topically 2 times daily (Patient taking differently: Apply 2 g topically 2 times daily Prn pain) 100 g 2    acetaminophen (ACETAMINOPHEN EXTRA STRENGTH) 500 MG tablet Take 1,000 mg by mouth daily as needed for Pain      bimatoprost (LUMIGAN) 0.01 % SOLN ophthalmic drops Place 1 drop into both eyes nightly      Agendia LANCETS 28G MISC 1 Bottle by Does not apply route three times daily 100 each 3    Blood Glucose Monitoring Suppl (PRODIGY AUTOCODE BLOOD GLUCOSE) w/Device KIT 1 Device by Does not apply route 3 times daily 1 kit 0    ketorolac (TORADOL) 30 MG/ML injection Inject 1 mL into the muscle once for 1 dose 1 mL 0     No current facility-administered medications for this visit.         Allergies   Allergen Reactions    Codeine Nausea And Vomiting        REVIEW OF SYSTEMS:     Review of Systems   Respiratory: Negative. Cardiovascular: Negative. Gastrointestinal: Negative. Musculoskeletal: Positive for back pain and gait problem. VITALS  Ht 5' 2\" (1.575 m)   Wt 200 lb (90.7 kg)   BMI 36.58 kg/m²      PHYSICAL EXAMINATION:     Physical Exam   General appearance: cooperative  Skin: no rash or skin lesions. HEENT: normocephalic  Optic Fundi: deferred  Neck: supple, no cervcical adenopathy or carotid bruit  Lungs: clear to auscultation  Heart: Regular rate and rhythm, normal S1, S2. No murmurs, clicks or gallops. Peripheral pulses: radial pulses palpable  Abdominal: BS present, soft, NT, ND  Extremities: no edema.  Multiple tender points concerning for fibromyalgia    Positive straight leg raising test   Positive femoral stretch test   Positive Clint test   Positive paraspinal muscle spasm and tenderness    NEUROLOGICAL EXAMINATION:     GENERAL  Appears comfortable and in no distress   HEENT  NC/ AT   HEART  S1 and S2 heard; palpation of pulses: radial pulse    NECK  Supple and no bruits heard   MENTAL STATUS:  Alert, oriented, intact memory, no confusion, normal speech, normal language, no hallucination or delusion   CRANIAL NERVES: II     -      Visual fields intact to confrontation  III,IV,VI -  PERR, EOMs full, no ptosis  V     -     Normal facial sensation   VII    -     Normal facial symmetry  VIII   -     Intact hearing   IX,X -     Symmetrical palate  XI    -     Symmetrical shoulder shrug  XII   -     Midline tongue, no atrophy    MOTOR FUNCTION: RUE: Significant for good strength of grade 5/5 in proximal and distal muscle groups   LUE: Significant for good strength of grade 5/5 in proximal and distal muscle groups   RLE: Significant for good strength of grade 5/5 in proximal and distal muscle groups   LLE: Significant for good strength of grade 4/5 in proximal and distal muscle groups      Normal bulk, normal tone and no involuntary movements, no tremor   SENSORY FUNCTION:  Slightly decreased sensation over the left lateral aspect of the leg   CEREBELLAR FUNCTION:  Intact fine motor control over upper limbs and lower limbs   REFLEX FUNCTION:  increased knee jerk on the left. STATION and GAIT  patient uses a cane for walking      IMAGING:     Imaging/Diagnostics:  XR LUMBAR SPINE (MIN 4 VIEWS)    Result Date: 3/18/2022  AP lateral lumbar spine with lateral flexion-extension disc base collapse at L5-S1 otherwise age-appropriate    MRI LUMBAR SPINE WO CONTRAST    Result Date: 3/23/2022  EXAMINATION: MRI OF THE LUMBAR SPINE WITHOUT CONTRAST, 3/22/2022 8:20 pm TECHNIQUE: Multiplanar multisequence MRI of the lumbar spine was performed without the administration of intravenous contrast. COMPARISON: 10/05/2021 HISTORY: ORDERING SYSTEM PROVIDED HISTORY: Lumbar radicular pain TECHNOLOGIST PROVIDED HISTORY: Reason for Exam: Lumbar radicular pain M54.16 (ICD-10-CM); Low back pain, unspecified back pain laterality, unspecified chronicity, unspecified whether sciatica present FINDINGS: BONES/ALIGNMENT: There is normal alignment of the spine. The vertebral body heights are maintained. The bone marrow signal appears unremarkable. SPINAL CORD: The conus terminates normally. SOFT TISSUES: No paraspinal mass identified. L1-L2: There is no significant disc herniation, spinal canal stenosis or neural foraminal narrowing. L2-L3: There is no significant disc herniation, spinal canal stenosis or neural foraminal narrowing. L3-L4: Disc protrusion, facet DJD, and ligamentum flavum hypertrophy with mild spinal canal stenosis. No significant neural foraminal stenosis. L4-L5: There is no significant disc herniation, spinal canal stenosis or neural foraminal narrowing. L5-S1: There is no significant disc herniation, spinal canal stenosis or neural foraminal narrowing. Mild degenerative change at L3-4 with mild spinal canal stenosis. No significant neural foraminal narrowing. ASSESSMENT:     65 female patient, presented to the clinic with low back pain (more on the left) radiating to the left lower ext with numbness and decreased sensation. MRI spine showed degenerative changes with mild disc bulge on the left side at the level of L3-L4 with ligamentum flavum hypertrophy and adjacent facet joint hypertrophy with multifidus muscle atrophy. Exam showed positive straight leg rasing and positive femoral stretch testing and paraspinal tenderness and spasm in addition to multiple tender points over the C spine, elbows and knees concerning for possible concomitant fibromyalgia    PLAN:     1. Will increase Gabapentin to (300, 400, 400) mg  2. Zanaflex 2 mg qhs prn   3. PT referral; patient was strongly encouraged to engage with physical therapy and I explained the importance of muscle strengthening to establish spinal stability especially reviewing her MRI spine showing significant multifidus muscle atrophy    4. Will follow up in 3 months  5. Dr. Pepe Medina ordered C and T spine MRIs, we could follow with their results. Ms. Kari Ramos received counseling on the following healthy behaviors: medical compliance, smoking cessation, blood pressure control, regular follow up with primary doctor.         Electronically signed by Curtis Hutchinson MD on 4/6/2022 at 12:36 PM

## 2022-04-11 ENCOUNTER — TELEPHONE (OUTPATIENT)
Dept: FAMILY MEDICINE CLINIC | Age: 62
End: 2022-04-11

## 2022-04-11 NOTE — TELEPHONE ENCOUNTER
Dr Ade Madison, pt is requesting medication to help her with MRI test on 04/20/22, said she's clautrophobia. Send Rx to [facility]. Next Visit Date:  Future Appointments   Date Time Provider Nicola Zaidi   4/13/2022  2:30 PM [facility] EMG  [facility] EMG [address] [facility]   4/13/2022  2:30 PM Daphnie Cohen MD [facility] med/reha [address]   4/19/2022  2:45 PM Neil nAand PT [facility] MOB PT Michael   4/20/2022  7:00 PM [facility] MRI RM 80 [facility]Z MRI STC Radiolog   4/20/2022  7:45 PM STC MRI  STCZ MRI STC Radiolog   7/6/2022 12:30 PM Omkar Roque MD Neuro [address] [facility] Maintenance   Topic Date Due    Shingles Vaccine (1 of 2) Never done    Diabetic retinal exam  01/12/2017    Diabetic foot exam  12/01/2017    DTaP/Tdap/Td vaccine (2 - Tdap) 12/15/2020    COVID-19 Vaccine (3 - Booster for Moderna series) 07/11/2021    Diabetic microalbuminuria test  06/11/2022    Lipid screen  06/24/2022    Potassium monitoring  07/21/2022    Creatinine monitoring  07/21/2022    A1C test (Diabetic or Prediabetic)  09/14/2022    Colorectal Cancer Screen  09/20/2022    Breast cancer screen  11/14/2022    Depression Screen  04/04/2023    Pneumococcal 0-64 years Vaccine (2 of 2 - PPSV23) 03/10/2026    Flu vaccine  Completed    Hepatitis C screen  Completed    HIV screen  Completed    Hepatitis A vaccine  Aged Out    Hib vaccine  Aged Out    Meningococcal (ACWY) vaccine  Aged Out       Hemoglobin A1C (%)   Date Value   09/14/2021 7.7   06/24/2021 7.9 (H)   04/20/2021 7.3             ( goal A1C is < 7)   Microalb/Crt.  Ratio (mcg/mg creat)   Date Value   06/11/2021 CANNOT BE CALCULATED     LDL Cholesterol (mg/dL)   Date Value   06/24/2021 52   04/20/2021 48       (goal LDL is <100)   AST (U/L)   Date Value   02/04/2021 18     ALT (U/L)   Date Value   02/04/2021 20     BUN (mg/dL)   Date Value   07/21/2021 30 (H)     BP Readings from Last 3 Encounters:   04/06/22 96/66   04/04/22 121/75   11/22/21 115/70 (goal 120/80)    All Future Testing planned in [facility]  Lab Frequency Next Occurrence   Basic Metabolic Panel Once 90/73/8008   Saline lock IV Once 05/10/2022   Lumbar Epidural Steroid Injection/Caudal Once 11/10/2021   MRI CERVICAL SPINE WO CONTRAST Once 03/31/2022   MRI THORACIC SPINE WO CONTRAST Once 03/31/2022               Patient Active Problem List:     Cervical spondylosis     Type II or unspecified type diabetes mellitus without mention of complication, not stated as uncontrolled     Hypertension     Abnormal auditory perception     Eustachian tube dysfunction     Chronic serous otitis media     Nasal polyps     Nasal congestion     Osteoarthritis of left knee     Osteoarthritis of right knee     DIEGO (nonalcoholic steatohepatitis)     Vitamin D deficiency     Iron deficiency anemia     Dyslipidemia     Diabetes mellitus type 2, uncontrolled (HCC)     Left hip pain     Trochanteric bursitis     Fatigue     Daytime somnolence     Snoring     Chronic left shoulder pain     Restless legs syndrome     Generalized anxiety disorder     Primary osteoarthritis, left shoulder     Tendinopathy of left shoulder     Adhesive capsulitis of left shoulder     Lumbar radiculopathy

## 2022-04-11 NOTE — TELEPHONE ENCOUNTER
Pt wanted to know if received her medical leave paperwork. Writer didn't see this paperwork on the fax machine.

## 2022-04-11 NOTE — TELEPHONE ENCOUNTER
CP-to let her know office received [facility] forms for completion, scanned in chart, placed in Dr Cheri Henriquez box. Will need form completed with dates from work excuse letter.

## 2022-04-12 DIAGNOSIS — F41.8 TEST ANXIETY: Primary | ICD-10-CM

## 2022-04-12 RX ORDER — LORAZEPAM 0.5 MG/1
0.5 TABLET ORAL EVERY 8 HOURS PRN
Qty: 1 TABLET | Refills: 0 | Status: SHIPPED | OUTPATIENT
Start: 2022-04-12 | End: 2022-04-13

## 2022-04-13 ENCOUNTER — HOSPITAL ENCOUNTER (OUTPATIENT)
Dept: NEUROLOGY | Age: 62
Discharge: HOME | End: 2022-04-13
Payer: COMMERCIAL

## 2022-04-13 DIAGNOSIS — M54.16 LUMBAR RADICULOPATHY: ICD-10-CM

## 2022-04-13 PROCEDURE — 95909 NRV CNDJ TST 5-6 STUDIES: CPT | Performed by: PHYSICAL MEDICINE & REHABILITATION

## 2022-04-13 PROCEDURE — 95886 MUSC TEST DONE W/N TEST COMP: CPT | Performed by: PHYSICAL MEDICINE & REHABILITATION

## 2022-04-13 RX ORDER — KETOROLAC TROMETHAMINE 30 MG/ML
30 INJECTION, SOLUTION INTRAMUSCULAR; INTRAVENOUS ONCE
Qty: 1 ML | Refills: 0 | OUTPATIENT
Start: 2022-04-13 | End: 2022-04-13

## 2022-04-13 NOTE — TELEPHONE ENCOUNTER
Last visit: 4/4/22  Last Med refill: 4/4/2022  Does patient have enough medication for 72 hours: Yes    Next Visit Date:  Future Appointments   Date Time Provider Nicola Zaidi   4/19/2022  2:45 PM Katharine Conley, PT [facility] PT [facility]   4/20/2022  7:00  [facility] MRI  STCZ MRI ST Radiolog   4/20/2022  7:45 PM ST[facility] MRI  STCZ MRI ST Radiolog   7/6/2022 12:30 PM Omkar Vega MD Neuro  [address] [facility] Maintenance   Topic Date Due    Shingles Vaccine (1 of 2) Never done    Pneumococcal 0-64 years Vaccine (2 - PCV) 11/27/2016    Diabetic retinal exam  01/12/2017    Diabetic foot exam  12/01/2017    DTaP/Tdap/Td vaccine (2 - Tdap) 12/15/2020    COVID-19 Vaccine (3 - Booster for Moderna series) 07/11/2021    Diabetic microalbuminuria test  06/11/2022    Lipid screen  06/24/2022    Potassium monitoring  07/21/2022    Creatinine monitoring  07/21/2022    A1C test (Diabetic or Prediabetic)  09/14/2022    Colorectal Cancer Screen  09/20/2022    Breast cancer screen  11/14/2022    Depression Screen  04/04/2023    Flu vaccine  Completed    Hepatitis C screen  Completed    HIV screen  Completed    Hepatitis A vaccine  Aged Out    Hib vaccine  Aged Out    Meningococcal (ACWY) vaccine  Aged Out       Hemoglobin A1C (%)   Date Value   09/14/2021 7.7   06/24/2021 7.9 (H)   04/20/2021 7.3             ( goal A1C is < 7)   Microalb/Crt.  Ratio (mcg/mg creat)   Date Value   06/11/2021 CANNOT BE CALCULATED     LDL Cholesterol (mg/dL)   Date Value   06/24/2021 52   04/20/2021 48       (goal LDL is <100)   AST (U/L)   Date Value   02/04/2021 18     ALT (U/L)   Date Value   02/04/2021 20     BUN (mg/dL)   Date Value   07/21/2021 30 (H)     BP Readings from Last 3 Encounters:   04/06/22 96/66   04/04/22 121/75   11/22/21 115/70          (goal 120/80)    All Future Testing planned in [facility]  Lab Frequency Next Occurrence   Basic Metabolic Panel Once 27/03/4436   Saline lock IV Once 05/10/2022   Lumbar Epidural Steroid Injection/Caudal Once 11/10/2021   MRI CERVICAL SPINE WO CONTRAST Once 03/31/2022   MRI THORACIC SPINE WO CONTRAST Once 03/31/2022               Patient Active Problem List:     Cervical spondylosis     Type II or unspecified type diabetes mellitus without mention of complication, not stated as uncontrolled     Hypertension     Abnormal auditory perception     Eustachian tube dysfunction     Chronic serous otitis media     Nasal polyps     Nasal congestion     Osteoarthritis of left knee     Osteoarthritis of right knee     DIEGO (nonalcoholic steatohepatitis)     Vitamin D deficiency     Iron deficiency anemia     Dyslipidemia     Diabetes mellitus type 2, uncontrolled (HCC)     Left hip pain     Trochanteric bursitis     Fatigue     Daytime somnolence     Snoring     Chronic left shoulder pain     Restless legs syndrome     Generalized anxiety disorder     Primary osteoarthritis, left shoulder     Tendinopathy of left shoulder     Adhesive capsulitis of left shoulder     Lumbar radiculopathy

## 2022-04-15 RX ORDER — CELECOXIB 100 MG/1
100 CAPSULE ORAL 2 TIMES DAILY
Qty: 120 CAPSULE | Refills: 1 | Status: SHIPPED | OUTPATIENT
Start: 2022-04-15 | End: 2022-08-16

## 2022-04-15 NOTE — TELEPHONE ENCOUNTER
Pt called asking for either Percocet or Toradol for her back pain? Does patient have enough medication for 72 hours: No:     Next Visit Date:  Future Appointments   Date Time Provider Nicola Zaidi   4/19/2022  2:45 PM Terence Taylor, PT [facility] MOB PT [facility]   4/20/2022  7:00  [facility] MRI RM 80 STCZ MRI ST Radiolog   4/20/2022  7:45 PM STC MRI  STCZ MRI ST Radiolog   7/6/2022 12:30 PM Omkar Rao MD Neuro  [address] [place] Maintenance   Topic Date Due    Shingles Vaccine (1 of 2) Never done    Pneumococcal 0-64 years Vaccine (2 - PCV) 11/27/2016    Diabetic retinal exam  01/12/2017    Diabetic foot exam  12/01/2017    DTaP/Tdap/Td vaccine (2 - Tdap) 12/15/2020    COVID-19 Vaccine (3 - Booster for Moderna series) 07/11/2021    Diabetic microalbuminuria test  06/11/2022    Lipid screen  06/24/2022    Potassium monitoring  07/21/2022    Creatinine monitoring  07/21/2022    A1C test (Diabetic or Prediabetic)  09/14/2022    Colorectal Cancer Screen  09/20/2022    Breast cancer screen  11/14/2022    Depression Screen  04/04/2023    Flu vaccine  Completed    Hepatitis C screen  Completed    HIV screen  Completed    Hepatitis A vaccine  Aged Out    Hib vaccine  Aged Out    Meningococcal (ACWY) vaccine  Aged Out       Hemoglobin A1C (%)   Date Value   09/14/2021 7.7   06/24/2021 7.9 (H)   04/20/2021 7.3             ( goal A1C is < 7)   Microalb/Crt.  Ratio (mcg/mg creat)   Date Value   06/11/2021 CANNOT BE CALCULATED     LDL Cholesterol (mg/dL)   Date Value   06/24/2021 52   04/20/2021 48       (goal LDL is <100)   AST (U/L)   Date Value   02/04/2021 18     ALT (U/L)   Date Value   02/04/2021 20     BUN (mg/dL)   Date Value   07/21/2021 30 (H)     BP Readings from Last 3 Encounters:   04/06/22 96/66   04/04/22 121/75   11/22/21 115/70          (goal 120/80)    All Future Testing planned in [facility]  Lab Frequency Next Occurrence   Basic Metabolic Panel Once 68/75/2705 Saline lock IV Once 05/10/2022   Lumbar Epidural Steroid Injection/Caudal Once 11/10/2021   MRI CERVICAL SPINE WO CONTRAST Once 03/31/2022   MRI THORACIC SPINE WO CONTRAST Once 03/31/2022               Patient Active Problem List:     Cervical spondylosis     Type II or unspecified type diabetes mellitus without mention of complication, not stated as uncontrolled     Hypertension     Abnormal auditory perception     Eustachian tube dysfunction     Chronic serous otitis media     Nasal polyps     Nasal congestion     Osteoarthritis of left knee     Osteoarthritis of right knee     DIEGO (nonalcoholic steatohepatitis)     Vitamin D deficiency     Iron deficiency anemia     Dyslipidemia     Diabetes mellitus type 2, uncontrolled (HCC)     Left hip pain     Trochanteric bursitis     Fatigue     Daytime somnolence     Snoring     Chronic left shoulder pain     Restless legs syndrome     Generalized anxiety disorder     Primary osteoarthritis, left shoulder     Tendinopathy of left shoulder     Adhesive capsulitis of left shoulder     Lumbar radiculopathy

## 2022-04-19 ENCOUNTER — HOSPITAL ENCOUNTER (OUTPATIENT)
Dept: PHYSICAL THERAPY | Age: 62
Setting detail: THERAPIES SERIES
Discharge: HOME | End: 2022-04-19
Payer: COMMERCIAL

## 2022-04-19 PROCEDURE — 97162 PT EVAL MOD COMPLEX 30 MIN: CPT

## 2022-04-19 PROCEDURE — 97110 THERAPEUTIC EXERCISES: CPT

## 2022-04-19 PROCEDURE — 97140 MANUAL THERAPY 1/> REGIONS: CPT

## 2022-04-19 NOTE — CONSULTS
[address] [facility]   Outpatient Physical Therapy  Physical Therapy Lumbar Evaluation    Date:  2022  Patient: Trevor Momin  : 1960  MRN: [id number]  Physician: Leyla Pyle MD (resident physician)  Marge Hess MD (attending physician)   Insurance: [employer]. Auth required after 30 visits  Medical Diagnosis: M54.16 (ICD-10-CM) - Lumbar radiculopathy  Rehab Codes: M54.16  Onset Date: 2021 with recent exacerbation 2022   Next 's appt: 22 for thoracic and cervical MRI. 22 follow up with neurology    Subjective:   CC: Recent exacerbation of chronic L sided lumbar radiculopathy  HPI: Pt c/o chronic hx of L sided lumbar radiculopathy (radiating into L posterior hip, thigh and lower leg) which started insidiously in 2021. Pt was seen at this facility for 14 PT visits from August to 2021 with very good results and near complete resolution of peripheral LE sx, discharged to an independent aquatic HEP at that time. Shortly after completing PT pt also had a LESI at L5-S1 with good relief and sx were stable for a while. However, pt reports a recent exacerbation of similar sx in the beginning of 2022, again with no mechanism of injury or contributing factors. States that sx this time around seemed to be even more severe with very high levels of pain into her L ankle which has made it difficult for her to walk or mobilize in any capacity. Updated lumbar MRI was mostly unremarkable aside from mild DJD at L3-L4 and pt was referred back to pain mgmt and neurology for continued mgmt. Attended neurology consult on 22 where pt was put on Zanaflex and an increased dosage of Gabapentin, along with a new PT referral. Pt has also been attending 2x/week chiropractic treatment with some improvement. However, continues to report very high levels of pain through lower back radiating pain and numbness down L posterolateral LE.  Feels pain constantly through lower leg/ankle and has been having to walk with a [facility] for support. PMHx: [] Unremarkable [x] Diabetes [x] HTN  [] Pacemaker   [] MI/Heart Problems [] Cancer [x] Arthritis [x] Other: HLD              [x] Refer to full medical chart  In EPIC     Comorbidities:   [x] Obesity [] Dialysis  [] Other:   [] Asthma/COPD [] Dementia [] Other:   [] Stroke [] Sleep apnea [] Other:   [] Vascular disease [] Rheumatic disease [] Other:     Preferred Language:   [x] English           [] Other:    Prior Imaging: Recent lumbar MRI from 3/15/22 showed mild degenerative changes at L3-L4 with mild spinal canal stenosis and multifidus atrophy. No significant neural foraminal stenosis. Recent EMG limited/unremarkable. Previous Treatment: Attended 14 PT visits (primarily aquatics) at this facility from August-November 2021 with good relief. Plan was to continue independent land and aquatic based exercise at the [facility] but pt admits that she did not end up following through with this plan. 1 prior LESI at L5-S1. 1 recent Toradol injection. Currently receiving chiropractic treatment at [facility] in [place] 2x/week with some improvement. 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 Was able to work full-time and ambulate without a SPC without significant limitations.      Pain:  [x] Yes  [] No Location: L low back to L posterolateral LE  Pain Rating: (0-10 scale) 8/10  Pain altered Tx:  [] Yes  [x] No  Action:    Symptoms:  [] Improving [] Worsening [x] Same  Aggravating factors: Extended sitting, forward bending, L LE numbness with any period of walking  Alleviating factors: Heat, shifting away from L side in sitting, laying on back       Functional Status Previous level of function Current level of function Comments   Sitting [x] Independent  [] Deficit [] Independent  [x] Deficit Pain   Standing/walking [x] Independent  [] Deficit [] Independent  [x] Deficit High levels of pain weight bearing through L LE. Currently using [address] [employer]   Driving [x] Independent  [] Deficit [] Independent  [x] Deficit Pain   Housekeeping/Meal Preparation [x] Independent  [] Deficit [] Independent  [x] Deficit    Lifting/Carrying [x] Independent  [] Deficit [] Independent  [x] Deficit Unable   Bending/Reaching [x] Independent  [] Deficit [] Independent  [x] Deficit High levels of pain into L low back and L LE   Stair climbing [x] Independent  [] Deficit [] Independent  [x] Deficit Painful   Pivoting [x] Independent  [] Deficit [] Independent  [x] Deficit High pain levels   Squatting [x] Independent  [] Deficit [] Independent  [x] Deficit Unable   Other [] Independent  [] Deficit [] Independent  [] Deficit      Patient Goals/Rehab Expectations: To alleviate pain and get back to normal      Objective:      OBSERVATION No Deficit Deficit Not Tested Comments   Forward Head [] [x] []    Rounded Shoulders [] [x] []    Kyphosis [] [x] []    Lordosis [] [x] [] Reduced    Scoliosis [] [] [x]    Innominate Alignment [] [] [x]    NEUROLOGICAL       Reflexes [] [] [x]    Compression/Distraction [] [x] [] Pain with lumbar/SIJ compression  Relief with long axis traction   Sensation [] [x] [] Intermittent numbness in L LE, primarily at toes/ankle   FALL RISK?  [x] []     Comments:        Range of Motion  Left Range of Motion  Right Strength  Left Strength  Right   Lumbar Flexion 50%  Increased pain 50% 3+/5 3+/5   Lumbar Extension 50%  Increased pain 50% 4-/5 4-/5   Lumbar Rotation 25%  Increased pain 100% 4/5 4/5   Lumbar Side Bend 100% 100% 4/5 4/5   Hip Flexion WNL for all below WNL for all below 3/5 4/5   Hip Abduction   3/5 4/5   Hip Adduction   3+/5 4/5   Hip Extension   3-/5 4/5   Hip ER       Hip IR       Knee Flexion   4/5 4+/5   Knee Extension   3-/5 4+/5   Dorsiflexion   4/5 4+/5   Plantar flexion   4-/5 4+/5   Inversion       Eversion           LUMBAR/SIJ SPECIAL TESTS Left Right   Standing Flexion + [x]         - []           NT []  + []         - []           NT []    Repeated Flexion + []         - [x]           NT []  + []         - []           NT []    Repeated Extension + []         - [x]           NT []  + []         - []           NT []    SLR + [x]         - []           NT []  + []         - []           NT []    Slump Test + [x]         - []           NT []  + []         - []           NT []    Prone Instability Test + []         - []           NT [x]  + []         - []           NT []    Anterior Gapping + [x]         - []           NT []  + []         - []           NT []    Posterior Gapping + [x]         - []           NT []  + []         - []           NT []    Sacral Thrust + [x]         - []           NT []  + []         - []           NT []    Thigh Thrust + [x]         - []           NT []  + []         - []           NT []    Gaenslen's + []         - []           NT [x]  + []         - []           NT []    Other:  + []         - []           NT []  + []         - []           NT []      HIP SPECIAL TESTS Left Right   MILES + []         - [x]           NT []  + []         - []           NT []    FADIR + []         - [x]           NT []  + []         - []           NT []    Scour + []         - []           NT [x]  + []         - []           NT []    Trendelenburg Sign + [x]         - []           NT []  + []         - []           NT []    Patrick + []         - [x]           NT []  + []         - [] NT []    Long axis traction + [x]         - []           NT []   Relieves pain + []         - []           NT []    Other: + []         - []           NT []  + []         - []           NT []        NEURAL/VASCULAR TESTS Left Right   Sciatic Nerve Tensioning + [x]         - []           NT []  + []         - []           NT []    Tibial Nerve Tensioning + []         - [x]           NT []  + []         - []           NT []    Sural Nerve Tensioning + [x]         - []           NT []  + []         - []           NT []    Common Peroneal Nerve Tensioning + []         - [x]           NT []  + []         - []           NT []    Femoral Nerve Tensioning + []         - []           NT [x]  + []         - []           NT []    Other: + []         - []           NT []  + []         - []           NT []      MUSCLE LENGTH TESTING Normal Left Tight Right Tight   Glutes []  [x]  []    Piriformis []  [x]  []    ITB/TFL []  []  []    Hip Flexors []  []  []    Quads []  []  []    Hamstrings []  []  []    Gastrocnemius []  []  []    Soleus []  []  []     []  []  []     []  []  []        Joint Mobility: Pain/guarding and diffuse hypomobility at all lumbar segments. Palpation: Hypertonic and TTP at L glute max/med, piriformis and L sided lumbar paraspinals. Pain to direct palpation at all lumbosacral segments and L SIJ. Gait: Independent []           Modified Independent [x]            Not independent []  Comments:         Assessment:  Problems:    [x] ? Pain:  [x] ? ROM:  [x] ? Strength:  [x] ? Function:  [] Other:    Pt is a 64 y/o F referred to PT with recent (1.5 month hx) exacerbation of chronic L sided lumbar radiculopathy. Pt presents to evaluation today with high levels of pain in her low back, radiating into her L posterior hip and posterolateral LE as well as persistent numbness in L lower leg.  Significant pain noted with lumbar flexion, extension and L rotation and pt demonstrates a positive SLR with sciatic and sural nerve bias. Very significant weakness present in core, lumbar stabilizers and L hip with pt currently relying on a [facility] for support while ambulating. Skilled PT intervention is required to help centralize/modulate sx, improve pain free lumbar AROM and maximize neuromuscular strength & stability necessary for unrestricted ADL/ work tolerances at her prior level of function. Education provided today on safe use of SPC contralateral to painful side, as well as importance of upright postural stability in sitting to mitigate sx. Piriformis stretch also provided today given significant soft tissue tightness in L posterior hip. Recommending pt start PT in aquatic environment given very high pain levels and good response to this setting in the past.       STG: (to be met in 6 treatments)  1. Pt will self report worst pain no greater than 3/10 in order to better tolerate ADLs/work activities with minimal dysfunction  2. Pt will improve lumbar AROM to 90% or greater in all planes in order to demonstrate ability to move/reach in all planes unrestricted at PLOF  3. Pt will improve trunk strength to 4/5 or greater in all planes to show improved stability at prior level  4. Pt will self report reduced peripheral L LE pain (no distal than L knee) to demonstrate initial  favorable centralization response to therapy  LTG: (to be met in 12 treatments)  1. Pt will demonstrate improved B LE strength to 4/5 or greater in order to demonstrate improved stability/strength necessary for unrestricted ADLs/work activities   2. Pt will self report ability to complete all work duties with pain no greater than 1/10 to demonstrate unrestricted functional tolerances at prior level  3. Pt will improve core strength Sahrmann Grade 2/5 or greater to demonstrate better support and stability to lumbar spine  4.  Pt will decrease SHEY to 10% impaired or less in order to demonstrate improved functional tolerances at PLOF with minimal restriction/dysfunction  5. Pt will demonstrate independence with a long term HEP for continued progress/maintenance after completion of PT        Functional Assessment Used: Mod. SHEY  Current Status Score: 68% impaired  Goal Status Score: 10% impaired    Evaluation Complexity:  History (Personal factors, comorbidities) [] 0 [] 1-2 [x] 3+   Exam (limitations, restrictions) [] 1-2 [x] 3 [] 4+   Clinical presentation (progression) [] Stable [x] Evolving  [] Unstable   Decision Making [] Low [x] Moderate [] High    [] Low Complexity [x] Moderate Complexity [] High Complexity     Rehab Potential:  [x] Good  [x] Fair  [] Poor   Suggested Professional Referral:  [x] No  [] Yes:  Barriers to Goal Achievement[de-identified]  [x] No  [] Yes:  Domestic Concerns:  [x] No  [] Yes:    Pt. Education:  [x] Plans/Goals, Risks/Benefits discussed  [x] Home exercise program  Method of Education: [x] Verbal  [x] Demo  [] Written   Comprehension of Education:  [x] Verbalizes understanding. [x] Demonstrates understanding. [] Needs Review. [] Demonstrates/verbalizes understanding of HEP/Ed previously given.     Treatment Plan:  [x] Therapeutic Exercise   05686  [] Iontophoresis: 4 mg/mL Dexamethasone Sodium Phosphate  mAmin  64764   [] Therapeutic Activity  49651 [] Vasopneumatic cold with compression  13873    [] Gait Training   33441 [] Ultrasound   48648   [] Neuromuscular Re-education  25293 [] Electrical Stimulation Unattended  39150   [] Manual Therapy  43762 [] Electrical Stimulation Attended  22085   [x] Instruction in HEP  [] Lumbar/Cervical Traction  70515   [x] Aquatic Therapy   20444 [] Cold/hotpack    [] Massage   24773      [] Dry Needling, 1 or 2 muscles  37326   [] Biofeedback, first 15 minutes   36795  [] Biofeedback, additional 15 minutes   29083 [] Dry Needling, 3 or more muscles  35221        Frequency: 2x/week for 12 visits    Todays Treatment:  Modalities:   Precautions: None  Exercises:  Exercise Reps/ Time Weight/ Level Comments   Manual therapy 8'  MFR to L glute max/med and piriformis  Long axis distraction through L LE   Gait training 3'  Instruction on proper use of SPC in R hand and adjustment to proper height to improve support through L stance phase   Seated piriformis stretch 30'' x 2  Initial HEP; L LE         Postural education 2'  Importance of upright posture and neutral lumbar spine in sitting   Aquatic orientation      Other:    Specific Instructions for next treatment: Initiate aquatic based PT intervention focusing on lumbopelvic stability and progressive core/hip strengthening as appropriate. Emphasis on centralization/modulation of sx. Relief noted with long axis traction through L LE and will likely feel relief with deep water hang at end of visit if comfortable    Treatment Charges: Mins Units   [x] Evaluation       []  Low       [x]  Moderate       []  High 45 1   []  Modalities     [x]  Ther Exercise 4 0   [x]  Manual Therapy 8 1   []  Ther Activities     []  Aquatics     []  Neuromuscular     [x]  Gait Training 3 0   []  Dry Needling           1-2 muscles     []  Dry Needling           3 or more muscles     [] Vasocompression     []  Other       TOTAL TREATMENT TIME: 60    Time in: 2:40pm   Time Out: 3:40pm    Electronically signed by: Aisha Miller PT        Physician Signature:________________________________Date:__________________  By signing above or cosigning this note, I have reviewed this plan of care and certify a need for medically necessary rehabilitation services.      *PLEASE SIGN ABOVE AND FAX BACK ALL PAGES*

## 2022-04-20 ENCOUNTER — HOSPITAL ENCOUNTER (OUTPATIENT)
Dept: MRI IMAGING | Age: 62
Discharge: HOME | End: 2022-04-22
Payer: COMMERCIAL

## 2022-04-20 DIAGNOSIS — M54.50 LOW BACK PAIN, UNSPECIFIED BACK PAIN LATERALITY, UNSPECIFIED CHRONICITY, UNSPECIFIED WHETHER SCIATICA PRESENT: ICD-10-CM

## 2022-04-20 DIAGNOSIS — M54.16 LUMBAR RADICULAR PAIN: ICD-10-CM

## 2022-04-20 PROCEDURE — 72141 MRI NECK SPINE W/O DYE: CPT

## 2022-04-20 PROCEDURE — 72146 MRI CHEST SPINE W/O DYE: CPT

## 2022-04-26 ENCOUNTER — HOSPITAL ENCOUNTER (OUTPATIENT)
Dept: PHYSICAL THERAPY | Age: 62
Setting detail: THERAPIES SERIES
Discharge: HOME | End: 2022-04-26
Payer: COMMERCIAL

## 2022-04-26 PROCEDURE — 97113 AQUATIC THERAPY/EXERCISES: CPT

## 2022-04-26 NOTE — FLOWSHEET NOTE
[facility] Outpatient Physical Therapy   [address]   Phone: [phone]   Fax: [phone]    Physical Therapy Daily Treatment Note      Date:  2022  Patient Name:  Darrell Serrano    :  1960  MRN: [id number]  Physician: Abdiaziz Mclaughlin MD (resident physician)  Gurdeep Finnegan MD (attending physician)                                  Insurance: [employer]. Auth required after 30 visits  Medical Diagnosis: M54.16 (ICD-10-CM) - Lumbar radiculopathy              Rehab Codes: M54.16  Onset Date: 2021 with recent exacerbation 2022                    Next 's appt: 22 for thoracic and cervical MRI. 22   Visit# / total visits: 2 Cancels/No Shows: *0/0    Subjective:  Pain increases with prolonged sitting, standing and walking. States she had her leave from work due to intolerance sitting. Since onset of increased left lower back pain Dr has increased her gabapentin and gave her muscle relaxers with some relief; Took steroids initially without relief. Reports most comfortable position is flat on her back in bed or short walks within house  Pain:  [] Yes  [] No Location:(L) Lower back into buttock, posterior/lateral thigh into calf, ankle and foot Pain Rating: (0-10 scale) 6/10  Pain altered Tx:  [] No  [] Yes  Action:  Comments: Initiated aquatic therapy with emphasis on pain control, postural awareness and importance of core stability. Educated patient on benefits of aquatic therapy. Arrives to pool area with straight cane    Objective:  Modalities:   Precautions:  Exercises:    [address] Exercise Log  Aquatic, Hip & DLS Program- Phase 1    Date of Eval:                                Primary PT: Blas Cuadra, PT  Diagnosis:   Things to Focus On (goals):   Surgical Precautions:  Medical Precautions:  [] C-9 dates  [] Occ Med   [] Medicare       Date 22       Visit # 2/       Walk F/L/R 2 Laps Marching 10x       Squats 10x5\"       Step-Ups F/L        Step Down F/L        Heel-toe raises 10x       SLR F/L/R 10x       Hip/Knee Flex/Ext        F/L Lunges                Kickboard Ex. Small       Iso Abd. 4x5\"       Push-pull 10x       Paddling                UE Format:        Horiz Abd/Add        IR/ER (wipers)        Alt Flex/Ext        Alt Press Down        Abd/Add                Deep Water: 1 Noodle       Hang 4'       Cycling 1'       Jacks        X-Country                Balance        SLS                Stretches  Add      Achllies        Hamstring        Piriformis                Cool Down  Add      Pain Rating 6           Specific Instructions for next treatment: Assess response to initial visit and progress as able    Assessment: [x] Progressing toward goals. Emphasis throughout program on posture and technique. Patient educated on centralization of symptoms vs radicular symptoms and encouraged to avoid increasing L LE symptoms. Increased left lower back pain with kick board isometric abdominal press down- decreased size of kickboard however pain persisted- deferred further reps this date. Finished program with deep water cycling and decompression to assist with pain. [] No change. [] Other:  Skilled PT intervention is required to help centralize/modulate sx, improve pain free lumbar AROM and maximize neuromuscular strength & stability necessary for unrestricted ADL/ work tolerances at her prior level of function.      STG: (to be met in 6 treatments)  1. Pt will self report worst pain no greater than 3/10 in order to better tolerate ADLs/work activities with minimal dysfunction  2. Pt will improve lumbar AROM to 90% or greater in all planes in order to demonstrate ability to move/reach in all planes unrestricted at PLOF  3. Pt will improve trunk strength to 4/5 or greater in all planes to show improved stability at prior level  4.  Pt will self report reduced peripheral L LE pain (no distal than L knee) to demonstrate initial  favorable centralization response to therapy  LTG: (to be met in 12 treatments)  1. Pt will demonstrate improved B LE strength to 4/5 or greater in order to demonstrate improved stability/strength necessary for unrestricted ADLs/work activities   2. Pt will self report ability to complete all work duties with pain no greater than 1/10 to demonstrate unrestricted functional tolerances at prior level  3. Pt will improve core strength Sahrmann Grade 2/5 or greater to demonstrate better support and stability to lumbar spine  4. Pt will decrease SHEY to 10% impaired or less in order to demonstrate improved functional tolerances at PLOF with minimal restriction/dysfunction  5. Pt will demonstrate independence with a long term HEP for continued progress/maintenance after completion of PT      Patient Goals/Rehab Expectations: To alleviate pain and get back to normal    Pt. Education:  [x] Yes  [] No  [] Reviewed Prior HEP/Ed  Method of Education: [x] Verbal  [x] Demo  [] Written  Comprehension of Education: Benefits of aquatics and postural awareness  [x] Verbalizes understanding. [x] Demonstrates understanding. [] Needs review. [] Demonstrates/verbalizes HEP/Ed previously given. Plan: [x] Continue per plan of care.    [] Other:      Treatment Charges: Mins Units   []  Modalities     []  Ther Exercise     []  Manual Therapy     []  Ther Activities     [x]  Aquatics 30 2   []  Neuromuscular     [] Vasocompression     [] Gait Training     [] Dry needling        [] 1 or 2 muscles        [] 3 or more muscles     []  Other     Total Treatment time 30 2     Time In: 928 PM            Time Out: 446 PM    Electronically signed by:  Koffi De Leon PTA

## 2022-04-28 ENCOUNTER — HOSPITAL ENCOUNTER (OUTPATIENT)
Dept: PHYSICAL THERAPY | Age: 62
Setting detail: THERAPIES SERIES
Discharge: HOME | End: 2022-04-28
Payer: COMMERCIAL

## 2022-04-28 PROCEDURE — 97113 AQUATIC THERAPY/EXERCISES: CPT

## 2022-04-28 NOTE — FLOWSHEET NOTE
[address] [facility]   [address] [facility] [address]   Phone: [phone]   Fax: [phone]    Physical Therapy Daily Treatment Note      Date:  2022  Patient Name:  Juanita Pollard    :  1960  MRN: [id number]  Physician: Paige Abad MD (resident physician)  Oz Cisneros MD (attending physician)                                  Insurance: Sedrick Felixchasleticiaabdimesha JASONkamrynjesus 150. Auth required after 30 visits  Medical Diagnosis: M54.16 (ICD-10-CM) - Lumbar radiculopathy              Rehab Codes: M54.16  Onset Date: 2021 with recent exacerbation 2022                    Next 's appt: 22 for thoracic and cervical MRI. 22   Visit# / total visits: 3/12 Cancels/No Shows: *0/0    Subjective: Increased pain after last visit in left posterior thigh/hamstring and mildly on right posterior thigh- feels squats and heel/toe raises were too much. Pain:  [] Yes  [] No Location:(L) Lower back into buttock, posterior/lateral thigh into calf, ankle and foot Pain Rating: (0-10 scale) 6/10  Pain altered Tx:  [] No  [] Yes  Action:  Comments: Emphasis on pain control, postural awareness and importance of core stability. Objective:  Modalities:   Precautions:  Exercises:    [address] Services Exercise Log  Aquatic, Hip & DLS Program- Phase 1    Date of Eval:                                Primary PT: Luda Garcia, PT  Diagnosis: Things to Focus On (goals):   Surgical Precautions:  Medical Precautions:  [] C-9 dates  [] Occ Med   [] Medicare       Date 22      Visit # 2/12 3/12      Walk F/L/R 2 Laps 2 Laps @ Rail      Marching 10x 10x      Squats 10x5\" Patient Deferred      Step-Ups F/L        Step Down F/L        Heel-toe raises 10x Pt Deferred      SLR F/L/R 10x 10x      Hip/Knee Flex/Ext  10x + Pain L LE      F/L Lunges                Kickboard Ex. Small Small      Iso Abd.  Verticle 4x5\" 10x5\"      Push-pull 10x 10x Paddling                UE Format:        Horiz Abd/Add        IR/ER (wipers)        Alt Flex/Ext        Alt Press Down        Abd/Add                Deep Water: 1 Noodle 1 Noodle      Hang 4' 5'      Cycling 1' 2'      Jacks  1'      X-Country                Balance        SLS                Stretches   Add     Achllies        Hamstring        Piriformis                Cool Down   Add     Pain Rating 6 6          Specific Instructions for next treatment: Add stretches and progress with deep water exercise as able    Assessment: [x] Progressing toward goals. Emphasis throughout program on posture and technique. Patient educated on centralization of symptoms vs radicular symptoms and encouraged to avoid increasing L LE symptoms. Patient remains guarded with mobility due to pain and continues to feel imbalance requiring UE support throughout program. Improved tolerance with kick board isometric abdominal press down. Per patient request held squats and heel/toe raises this date due to increased L hamstring pain last visit. Progressed deep water aerobics as noted above. Finished program with deep water decompression to assist with pain; patient notes minimal symptoms while unloaded. [] No change. [] Other:  Skilled PT intervention is required to help centralize/modulate sx, improve pain free lumbar AROM and maximize neuromuscular strength & stability necessary for unrestricted ADL/ work tolerances at her prior level of function.      STG: (to be met in 6 treatments)  1. Pt will self report worst pain no greater than 3/10 in order to better tolerate ADLs/work activities with minimal dysfunction  2. Pt will improve lumbar AROM to 90% or greater in all planes in order to demonstrate ability to move/reach in all planes unrestricted at PLOF  3. Pt will improve trunk strength to 4/5 or greater in all planes to show improved stability at prior level  4.  Pt will self report reduced peripheral L LE pain (no distal than L knee) to demonstrate initial  favorable centralization response to therapy  LTG: (to be met in 12 treatments)  1. Pt will demonstrate improved B LE strength to 4/5 or greater in order to demonstrate improved stability/strength necessary for unrestricted ADLs/work activities   2. Pt will self report ability to complete all work duties with pain no greater than 1/10 to demonstrate unrestricted functional tolerances at prior level  3. Pt will improve core strength Sahrmann Grade 2/5 or greater to demonstrate better support and stability to lumbar spine  4. Pt will decrease SHEY to 10% impaired or less in order to demonstrate improved functional tolerances at PLOF with minimal restriction/dysfunction  5. Pt will demonstrate independence with a long term HEP for continued progress/maintenance after completion of PT      Patient Goals/Rehab Expectations: To alleviate pain and get back to normal    Pt. Education:  [x] Yes  [] No  [] Reviewed Prior HEP/Ed  Method of Education: [x] Verbal  [x] Demo  [] Written  Comprehension of Education: Benefits of aquatics and postural awareness  [x] Verbalizes understanding. [x] Demonstrates understanding. [] Needs review. [] Demonstrates/verbalizes HEP/Ed previously given. Plan: [x] Continue per plan of care.    [] Other:      Treatment Charges: Mins Units   []  Modalities     []  Ther Exercise     []  Manual Therapy     []  Ther Activities     [x]  Aquatics 35 2   []  Neuromuscular     [] Vasocompression     [] Gait Training     [] Dry needling        [] 1 or 2 muscles        [] 3 or more muscles     []  Other     Total Treatment time 35 2     Time In: 130 PM            Time Out: 904 PM    Electronically signed by:  Kathy Allred PTA

## 2022-05-03 ENCOUNTER — HOSPITAL ENCOUNTER (OUTPATIENT)
Dept: PHYSICAL THERAPY | Age: 62
Setting detail: THERAPIES SERIES
Discharge: HOME | End: 2022-05-03
Payer: COMMERCIAL

## 2022-05-03 PROCEDURE — 97113 AQUATIC THERAPY/EXERCISES: CPT

## 2022-05-03 NOTE — FLOWSHEET NOTE
[facility]   [address] [facility] [address]   Phone: [phone]   Fax: [phone]    Physical Therapy Daily Treatment Note      Date:  5/3/2022  Patient Name:  Damien Bradley    :  1960  MRN: [id number]  Physician: Jacque Segal MD (resident physician)  Mara Zuniga MD (attending physician)                                  Insurance: [employer]. Auth required after 30 visits  Medical Diagnosis: M54.16 (ICD-10-CM) - Lumbar radiculopathy              Rehab Codes: M54.16  Onset Date: 2021 with recent exacerbation 2022                    Next 's appt: 22 for thoracic and cervical MRI. 22   Visit# / total visits:  Cancels/No Shows: 0/0    Subjective:  Denies increased pain after last visit. States pain is a little less today than least week  Pain:  [x] Yes  [] No Location:(L) Lower back into buttock, posterior/lateral thigh into calf, ankle and foot Pain Rating: (0-10 scale) 5/10  Pain altered Tx:  [x] No  [] Yes  Action:  Comments: Emphasis on pain control, postural awareness and importance of core stability. Objective:  Modalities:   Precautions:  Exercises:    [address] Services Exercise Log  Aquatic, Hip & DLS Program- Phase 1    Date of Eval:                                Primary PT: Reina Stallworth PT  Diagnosis: Things to Focus On (goals):   Surgical Precautions:  Medical Precautions:  [] C-9 dates  [] Occ Med   [] Medicare       Date 4/26/22 4/28/22 5/3/22     Visit # 2/12 3/12 4/12     Walk F/L/R 2 Laps 2 Laps @ Rail 2 Laps @ Rail     Marching 10x 10x 10x Lap    Squats 10x5\" Patient Deferred 10x3\"     Step-Ups F/L        Step Down F/L        Heel-toe raises 10x Pt Deferred Pt Deferred - -   SLR F/L/R 10x 10x 10x     Hip/Knee Flex/Ext  10x + Pain L LE 10x     F/L Lunges                Kickboard Ex. Small Small Small     Iso Abd.  Verticle 4x5\" 10x5\" 10x5\"     Push-pull 10x 10x 10x Paddling                UE Format:        Horiz Abd/Add        IR/ER (wipers)        Alt Flex/Ext        Alt Press Down        Abd/Add                Deep Water: 1 Noodle 1 Noodle 1 Noodle     Hang 4' 5' 5'     Cycling 1' 2' 2'     Jacks  1' 1'     X-Country   1'             Balance        SLS                Stretches        Achllies        Hamstring   2x20\"   1st Step     Piriformis                Cool Down    Add    Pain Rating 6 6 5         Specific Instructions for next treatment: Add marching lap as able    Assessment: [x] Progressing toward goals. Emphasis throughout program on posture and technique. Requires UE assist at rail in 30% WB Aquatic environment during walking and LE exercise due to imbalance and pain. Patient remains guarded with pain requiring extra time. Patient requests to hold heel/toe raises but willing to resume squats. Progressed with deep water hip flex/ext and modified hamstring stretches to first step. Finished program with deep water decompression to assist with pain; patient notes minimal symptoms while unloaded however notes increased stiffness when exiting pool via steps. Extremely cautious with progressions and worried as to whether squats and hamstring stretches will be too much. [] No change. [] Other:  Skilled PT intervention is required to help centralize/modulate sx, improve pain free lumbar AROM and maximize neuromuscular strength & stability necessary for unrestricted ADL/ work tolerances at her prior level of function.      STG: (to be met in 6 treatments)  1. Pt will self report worst pain no greater than 3/10 in order to better tolerate ADLs/work activities with minimal dysfunction  2. Pt will improve lumbar AROM to 90% or greater in all planes in order to demonstrate ability to move/reach in all planes unrestricted at PLOF  3. Pt will improve trunk strength to 4/5 or greater in all planes to show improved stability at prior level  4.  Pt will self report reduced peripheral L LE pain (no distal than L knee) to demonstrate initial  favorable centralization response to therapy  LTG: (to be met in 12 treatments)  1. Pt will demonstrate improved B LE strength to 4/5 or greater in order to demonstrate improved stability/strength necessary for unrestricted ADLs/work activities   2. Pt will self report ability to complete all work duties with pain no greater than 1/10 to demonstrate unrestricted functional tolerances at prior level  3. Pt will improve core strength Sahrmann Grade 2/5 or greater to demonstrate better support and stability to lumbar spine  4. Pt will decrease SHEY to 10% impaired or less in order to demonstrate improved functional tolerances at PLOF with minimal restriction/dysfunction  5. Pt will demonstrate independence with a long term HEP for continued progress/maintenance after completion of PT      Patient Goals/Rehab Expectations: To alleviate pain and get back to normal    Pt. Education:  [x] Yes  [] No  [] Reviewed Prior HEP/Ed  Method of Education: [x] Verbal  [x] Demo  [] Written  Comprehension of Education: Benefits of aquatics and postural awareness  [x] Verbalizes understanding. [x] Demonstrates understanding. [] Needs review. [] Demonstrates/verbalizes HEP/Ed previously given. Plan: [x] Continue per plan of care.    [] Other:      Treatment Charges: Mins Units   []  Modalities     []  Ther Exercise     []  Manual Therapy     []  Ther Activities     [x]  Aquatics 34 2   []  Neuromuscular     [] Vasocompression     [] Gait Training     [] Dry needling        [] 1 or 2 muscles        [] 3 or more muscles     []  Other     Total Treatment time 34 2     Time In: 156 PM            Time Out: 972 PM    Electronically signed by:  Kenny Cooper PTA

## 2022-05-04 ENCOUNTER — HOSPITAL ENCOUNTER (OUTPATIENT)
Dept: PHYSICAL THERAPY | Age: 62
Setting detail: THERAPIES SERIES
Discharge: HOME | End: 2022-05-04
Payer: COMMERCIAL

## 2022-05-04 PROCEDURE — 97113 AQUATIC THERAPY/EXERCISES: CPT

## 2022-05-04 NOTE — FLOWSHEET NOTE
[address] [address] [facility]   [address] [facility] [address]   Phone: [phone]   Fax: [phone]    Physical Therapy Daily Treatment Note      Date:  2022  Patient Name:  Ren Salinas    :  1960  MRN: [id number]  Physician: Zana Irizarry MD (resident physician)  Gaby Lewis MD (attending physician)                                  Insurance: [employer]. Auth required after 30 visits  Medical Diagnosis: M54.16 (ICD-10-CM) - Lumbar radiculopathy              Rehab Codes: M54.16  Onset Date: 2021 with recent exacerbation 2022                    Next 's appt: 22 for thoracic and cervical MRI. 22   Visit# / total visits:  Cancels/No Shows: 0/0    Subjective:  Reports she is sleeping better since starting therapy; feels pain has become more manageable. Noted some hamstring soreness/cramping after last visit but less painful than last visit. Pain:  [x] Yes  [] No Location:(L) Lower back into buttock, posterior/lateral thigh into calf, ankle and foot Pain Rating: (0-10 scale) 5/10  Pain altered Tx:  [x] No  [] Yes  Action:  Comments: Emphasis on pain control, postural awareness and importance of core stability. Objective:  Modalities:   Precautions:  Exercises:    [address] Services Exercise Log  Aquatic, Hip & DLS Program- Phase 1    Date of Eval:                                Primary PT: Mikayla Records, PT  Diagnosis:   Things to Focus On (goals):   Surgical Precautions:  Medical Precautions:  [] C-9 dates  [] Occ Med   [] Medicare       Date 4/26/22 4/28/22 5/3/22 5/4/22    Visit # 2/12 3/12 4/12 5/12    Walk F/L/R 2 Laps 2 Laps @ Rail 2 Laps @ Rail 2 Laps @ Rail    Marching 10x 10x 10x 1 Lap    Squats 10x5\" Patient Deferred 10x3\" 5x3\"    Step-Ups F/L        Step Down F/L        Heel-toe raises 10x Pt Deferred Pt Deferred - -   SLR F/L/R 10x 10x 10x 10x    Hip/Knee Flex/Ext  10x + Pain L LE 10x 10x F/L Lunges                Kickboard Ex. Small Small Small Small    Iso Abd. Verticle 4x5\" 10x5\" 10x5\" 10x5\"    Push-pull 10x 10x 10x 10x    Paddling                UE Format:     Add   Horiz Abd/Add        IR/ER (wipers)        Alt Flex/Ext        Alt Press Down        Abd/Add                Deep Water: 1 Noodle 1 Noodle 1 Noodle 1 Noodle    Hang 4' 5' 5' 5'    Cycling 1' 2' 2' 2'    Jacks  1' 1' 1'    X-Country   1' 1'            Balance        SLS                Stretches        Achllies        Hamstring   2x20\"   1st Step 2x20\"  1st step    Piriformis                Cool Down    1 Lap    Pain Rating 6 6 5 5        Specific Instructions for next treatment: Progress with speed and reps as able; add UE format. Assessment: [x] Progressing toward goals. Emphasis throughout program on posture and technique. Requires UE assist at rail in 30% WB Aquatic environment during walking and LE exercise due to imbalance and pain. Patient remains guarded with pain requiring extra time. Added marching lap without issue. Finished program with deep water decompression to assist with pain; patient notes minimal symptoms while unloaded however notes increased stiffness when exiting pool via steps. [] No change. [] Other:  Skilled PT intervention is required to help centralize/modulate sx, improve pain free lumbar AROM and maximize neuromuscular strength & stability necessary for unrestricted ADL/ work tolerances at her prior level of function.      STG: (to be met in 6 treatments)  1. Pt will self report worst pain no greater than 3/10 in order to better tolerate ADLs/work activities with minimal dysfunction  2. Pt will improve lumbar AROM to 90% or greater in all planes in order to demonstrate ability to move/reach in all planes unrestricted at PLOF  3. Pt will improve trunk strength to 4/5 or greater in all planes to show improved stability at prior level  4.  Pt will self report reduced peripheral L LE pain (no distal than L knee) to demonstrate initial  favorable centralization response to therapy  LTG: (to be met in 12 treatments)  1. Pt will demonstrate improved B LE strength to 4/5 or greater in order to demonstrate improved stability/strength necessary for unrestricted ADLs/work activities   2. Pt will self report ability to complete all work duties with pain no greater than 1/10 to demonstrate unrestricted functional tolerances at prior level  3. Pt will improve core strength Sahrmann Grade 2/5 or greater to demonstrate better support and stability to lumbar spine  4. Pt will decrease SHEY to 10% impaired or less in order to demonstrate improved functional tolerances at PLOF with minimal restriction/dysfunction  5. Pt will demonstrate independence with a long term HEP for continued progress/maintenance after completion of PT      Patient Goals/Rehab Expectations: To alleviate pain and get back to normal    Pt. Education:  [x] Yes  [] No  [] Reviewed Prior HEP/Ed  Method of Education: [x] Verbal  [x] Demo  [] Written  Comprehension of Education: Benefits of aquatics and postural awareness  [x] Verbalizes understanding. [x] Demonstrates understanding. [] Needs review. [] Demonstrates/verbalizes HEP/Ed previously given. Plan: [x] Continue per plan of care.    [] Other:      Treatment Charges: Mins Units   []  Modalities     []  Ther Exercise     []  Manual Therapy     []  Ther Activities     [x]  Aquatics 36 2   []  Neuromuscular     [] Vasocompression     [] Gait Training     [] Dry needling        [] 1 or 2 muscles        [] 3 or more muscles     []  Other     Total Treatment time 36 2     Time In: 832 PM            Time Out: 090 PM    Electronically signed by:  Randy Hughes PTA

## 2022-05-09 ENCOUNTER — HOSPITAL ENCOUNTER (OUTPATIENT)
Dept: PHYSICAL THERAPY | Age: 62
Setting detail: THERAPIES SERIES
Discharge: HOME | End: 2022-05-09
Payer: COMMERCIAL

## 2022-05-09 PROCEDURE — 97110 THERAPEUTIC EXERCISES: CPT

## 2022-05-09 PROCEDURE — 97113 AQUATIC THERAPY/EXERCISES: CPT

## 2022-05-09 NOTE — FLOWSHEET NOTE
[address] [facility],[address] Outpatient Physical Therapy   [address] [facility] [address]   Phone: [phone]   Fax: [phone]    Physical Therapy Daily Treatment Note      Date:  2022  Patient Name:  Harley Estrada    :  1960  MRN: [id number]  Physician: Pepe Contreras MD (resident physician)  Alex Mccoy MD (attending physician)                                  Insurance: [employer]. Auth required after 30 visits  Medical Diagnosis: M54.16 (ICD-10-CM) - Lumbar radiculopathy              Rehab Codes: M54.16  Onset Date: 2021 with recent exacerbation 2022                    Next 's appt: 22 for thoracic and cervical MRI. 22   Visit# / total visits:  Cancels/No Shows: 0/0    Subjective:  Returned to work today- sat a lot despite knowing she needed to get up. Patient reports continued soreness with (L) hamstring. Denies increased pain after last visit but pain levels remain relatively high  Pain:  [x] Yes  [] No Location:(L) Lower back into buttock, posterior/lateral thigh into calf, ankle and foot Pain Rating: (0-10 scale) 6/10  Pain altered Tx:  [x] No  [] Yes  Action:  Comments: Emphasis on pain control, postural awareness and importance of core stability. Objective:  Modalities:   Precautions:  Exercises:    [address] Services Exercise Log  Aquatic, Hip & DLS Program- Phase 1    Date of Eval:                                Primary PT: Marisela Arciniega, PT  Diagnosis:   Things to Focus On (goals):   Surgical Precautions:  Medical Precautions:  [] C-9 dates  [] Occ Med   [] Medicare       Date 4/26/22 4/28/22 5/3/22 5/4/22 5/9/22   Visit # 2/12 3/12 4/12 5/12 6/12   Walk F/L/R 2 Laps 2 Laps @ Rail 2 Laps @ Rail 2 Laps @ Rail 2 Laps @ Rail   Marching 10x 10x 10x 1 Lap @ Rail 2 Laps @ [facility] 10x5\" Patient Deferred 10x3\" 5x3\" 5x3\"   Step-Ups F/L        Step Down F/L        Heel-toe raises 10x Pt Deferred Pt Deferred - -   SLR F/L/R 10x 10x 10x 10x 10x   Hip/Knee Flex/Ext  10x + Pain L LE 10x 10x 10x   F/L Lunges                Kickboard Ex. Small Small Small Small Small   Iso Abd. Verticle 4x5\" 10x5\" 10x5\" 10x5\" 10x5\"   Push-pull 10x 10x 10x 10x 10x   Paddling                UE Format:        Horiz Abd/Add     10x   IR/ER (wipers)        Alt Flex/Ext     10x   Alt Press Down        Abd/Add     10x           Deep Water: 1 Noodle 1 Noodle 1 Noodle 1 Noodle 1 Noodle   Hang 4' 5' 5' 5' 5'   Cycling 1' 2' 2' 2' 2'   Jacks  1' 1' 1' 1'   X-Country   1' 1' 1'           Balance        SLS                Stretches        Achllies        Hamstring   2x20\"   1st Step 2x20\"  1st step 2x20\"  1st Step   Piriformis                Cool Down    1 Lap NT   Pain Rating 6 6 5 5 6       Specific Instructions for next treatment: Add step ups; progress UE format    Assessment: [x] Progressing toward goals. Emphasis throughout program on posture and technique. Requires UE assist at rail in 30% WB Aquatic environment during walking and LE exercise due to imbalance and pain. Patient remains guarded with pain requiring extra time. Added UE format- long lever activity only- patient reports she notes a good challenge with core stability. Finished program with deep water decompression to assist with pain; patient notes minimal symptoms while unloaded. [] No change. [] Other:  Skilled PT intervention is required to help centralize/modulate sx, improve pain free lumbar AROM and maximize neuromuscular strength & stability necessary for unrestricted ADL/ work tolerances at her prior level of function. STG: (to be met in 6 treatments)  1. Pt will self report worst pain no greater than 3/10 in order to better tolerate ADLs/work activities with minimal dysfunction GOAL PROGRESSING AND EXTENDED THROUGH POC 5/9  2.  Pt will improve lumbar AROM to 90% or greater in all planes in order to demonstrate ability to move/reach in all planes unrestricted at PLOF GOAL PROGRESSING AND EXTENDED THROUGH POC 5/9  3. Pt will improve trunk strength to 4/5 or greater in all planes to show improved stability at prior level GOAL PROGRESSING 5/9  4. Pt will self report reduced peripheral L LE pain (no distal than L knee) to demonstrate initial  favorable centralization response to therapy GOAL MET 5/9  LTG: (to be met in 12 treatments)  1. Pt will demonstrate improved B LE strength to 4/5 or greater in order to demonstrate improved stability/strength necessary for unrestricted ADLs/work activities GOAL PROGRESSING 5/9  2. Pt will self report ability to complete all work duties with pain no greater than 1/10 to demonstrate unrestricted functional tolerances at prior level GOAL PROGRESSING 5/9  3. Pt will improve core strength Sahrmann Grade 2/5 or greater to demonstrate better support and stability to lumbar spine  4. Pt will decrease SHEY to 10% impaired or less in order to demonstrate improved functional tolerances at PLOF with minimal restriction/dysfunction  5. Pt will demonstrate independence with a long term HEP for continued progress/maintenance after completion of PT    Patient Goals/Rehab Expectations: To alleviate pain and get back to normal    Pt. Education:  [x] Yes  [] No  [] Reviewed Prior HEP/Ed  Method of Education: [x] Verbal  [x] Demo  [] Written  Comprehension of Education: Benefits of aquatics and postural awareness  [x] Verbalizes understanding. [x] Demonstrates understanding. [] Needs review. [] Demonstrates/verbalizes HEP/Ed previously given. Plan: [x] Continue per plan of care.    [x] Other: See PT progress note from today's DOS for comments on progress to date      Treatment Charges: Mins Units   []  Modalities     [x]  Ther Exercise 15 1   []  Manual Therapy     []  Ther Activities     [x]  Aquatics 39 3   []  Neuromuscular     [] Vasocompression     [] Gait Training     [] Dry needling        [] 1 or 2 muscles        [] 3 or more muscles     []  Other Total Treatment time 54 4     Time In: 407 PM            Time Out: 468 PM    Physical therapy treatment completed today in part by physical therapist assistant (PTA). Treatment times reflect total treatment time combined by both PT and PTA for today's service.       Electronically signed by:  Jersey Villagran PTA

## 2022-05-09 NOTE — PROGRESS NOTES
[address] [facility]  [address] [facility]         Phone: [phone]       Fax: [phone]    Physical Therapy Progress Note    Date: 2022      Patient: Karolina Thakkar  : 1960  MRN: [id number]    Physician: Jaqueline Owens MD (resident physician)  Shawn Chilel MD (attending [id number]  Insurance: [facility]. Auth required after 30 visits   Diagnosis: M54.16 (ICD-10-CM) - Lumbar radiculopathy Onset Date: 2021 with recent exacerbation 2022  Next Dr. Sultana Stern: 22  Total visits attended:   Cancels/No shows: 0/0  Date of initial visit: 22                    Subjective:  Pain:  [x] Yes  [] No  Location: L low back into L posterior thigh Pain Rating: (0-10 scale) 6/10  Pain altered Tx:  [x] No  [] Yes  Action:  Comments:  Pt states that she feels like she's made considerable improvements since starting aquatic PT with less pain and numbness into her L LE. Feels more discomfort than usual today after returning to work and spending a lot of time sitting at her desk, but states that pain has not really been affecting her lower leg/ankle as much and seems to be mostly concentrated in her L lower back/posterior thigh. 6/10 discomfort currently.      Objective:     Range of Motion  Left Range of Motion  Right Strength  Left Strength  Right   Lumbar Flexion 100%  Increased pain in L posterior thigh at end range 100% 3+/5 3+/5   Lumbar Extension 50%  Increased pain in back 50% 4-/5 4-/5   Lumbar Rotation 75% 100% 4/5 4/5   Lumbar Side Bend 75%  Increased pain in L back/thigh 100% 4/5 4/5   Hip Flexion WNL for all below WNL for all below 3+/5 4/5   Hip Abduction     3+/5 4/5   Hip Adduction     4/5 4/5   Hip Extension     3+/5 4/5   Hip ER           Hip IR           Knee Flexion     4+/5 4+/5   Knee Extension     4/5 4+/5   Dorsiflexion     4+/5 4+/5   Plantar flexion     4+/5 4+/5   Inversion           Eversion         Assessment:  Pt has been seen for 6 PT visits to date for her chronic L lumbar radiculopathy. Overall, pt is progressing well towards goal with favorable centralization response evidenced by minimal recent sx in L lower leg. Continues to ambulate with SPC in community for support but states that she has not needed at home much. Noted good improvement in lumbar AROM and LE strength, although continues to be mildly painful and limited. Reinforced the importance of proper workstation ergonomics and upright posture to minimize recurrence of pain with return to work, and also provided education on seated sciatic nerve glides in pain free ranges to reduce sx irritability with neural tension. Pt is overall much improved compared to status at initial evaluation but recommending continued PT in accordance with original POC to continue working towards all goals. STG: (to be met in 6 treatments)  1. Pt will self report worst pain no greater than 3/10 in order to better tolerate ADLs/work activities with minimal dysfunction GOAL PROGRESSING AND EXTENDED THROUGH POC 5/9  2. Pt will improve lumbar AROM to 90% or greater in all planes in order to demonstrate ability to move/reach in all planes unrestricted at [facility] POC 5/9  3. Pt will improve trunk strength to 4/5 or greater in all planes to show improved stability at prior level GOAL PROGRESSING 5/9  4. Pt will self report reduced peripheral L LE pain (no distal than L knee) to demonstrate initial  favorable centralization response to therapy GOAL MET 5/9  LTG: (to be met in 12 treatments)  1. Pt will demonstrate improved B LE strength to 4/5 or greater in order to demonstrate improved stability/strength necessary for unrestricted ADLs/work activities GOAL PROGRESSING 5/9  2.  Pt will self report ability to complete all work duties with pain no greater than 1/10 to demonstrate unrestricted functional tolerances at prior level GOAL PROGRESSING 5/9  3. Pt will improve core strength Sahrmann Grade 2/5 or greater to demonstrate better support and stability to lumbar spine  4. Pt will decrease SHEY to 10% impaired or less in order to demonstrate improved functional tolerances at PLOF with minimal restriction/dysfunction  5. Pt will demonstrate independence with a long term HEP for continued progress/maintenance after completion of PT      Treatment to Date:  [] Therapeutic Exercise   59454  [] Iontophoresis: 4 mg/mL Dexamethasone Sodium Phosphate  mAmin  10819   [] Therapeutic Activity  78620 [] Vasopneumatic cold with compression  50614    [] Gait Training   53939 [] Ultrasound   42786   [] Neuromuscular Re-education  21505 [] Electrical Stimulation Unattended  37528   [] Manual Therapy  93570 [] Electrical Stimulation Attended  20784   [x] Instruction in HEP  [] Lumbar/Cervical Traction  49132   [x] Aquatic Therapy   32464 [] Cold/hotpack    [] Massage   54848      [] Dry Needling, 1 or 2 muscles  26284   [] Biofeedback, first 15 minutes   59456  [] Biofeedback, additional 15 minutes   09003 [] Dry Needling, 3 or more muscles  69747      Patient Status:     [x] Continue per initial plan of care. [] Additional visits necessary. [] Other:      PT Re-evaluation Time: 5:15-5:30pm (15' billed for skilled reassessment of all subjective & objective measures, all goals and extension of current POC)   *SEE PTA NOTE FROM TODAY'S DOS FOR COMPLETE BILLING SUMMARY*    Electronically signed by: Katharine Cnoley PT    If you have any questions or concerns, please don't hesitate to call. Thank you for your referral.    Physician Signature:________________________________Date:__________________  By signing above or cosigning this note, I have reviewed this plan of care and certify a need for medically necessary rehabilitation services.      *PLEASE SIGN ABOVE AND FAX BACK ALL PAGES*

## 2022-05-12 ENCOUNTER — HOSPITAL ENCOUNTER (OUTPATIENT)
Dept: PHYSICAL THERAPY | Age: 62
Setting detail: THERAPIES SERIES
Discharge: HOME | End: 2022-05-12
Payer: COMMERCIAL

## 2022-05-12 NOTE — FLOWSHEET NOTE
[] [facility]) - [facility]  [address]  [place]: [phone]   F: [phone]     Physical Therapy Cancel/No Show note    Date: 2022  Patient: Kaylene Baker  : 1960  MRN: [id number]    Visit Count:   Cancels/No Shows to date:     For today's appointment patient:    [x]  Cancelled    [] Rescheduled appointment    [] No-show     Reason given by patient:    [x]  Patient ill    []  Conflicting appointment    [] No transportation      [] Conflict with work    [] No reason given    [] Weather related    [] COVID-19    [] Other:      Comments:        [x] Next appointment was confirmed    Electronically signed by: Kayden Pineda PTA

## 2022-05-14 DIAGNOSIS — E78.5 DYSLIPIDEMIA: ICD-10-CM

## 2022-05-16 RX ORDER — ROSUVASTATIN CALCIUM 20 MG/1
TABLET, COATED ORAL
Qty: 30 TABLET | Refills: 2 | Status: SHIPPED | OUTPATIENT
Start: 2022-05-16 | End: 2022-07-15

## 2022-05-16 NOTE — TELEPHONE ENCOUNTER
E-scribe request for med refills. Please review and e-scribe if applicable. Last Visit Date:  4/4/22  Next Visit Date:  Visit date not found    Hemoglobin A1C (%)   Date Value   09/14/2021 7.7   06/24/2021 7.9 (H)   04/20/2021 7.3             ( goal A1C is < 7)   Microalb/Crt.  Ratio (mcg/mg creat)   Date Value   06/11/2021 CANNOT BE CALCULATED     LDL Cholesterol (mg/dL)   Date Value   06/24/2021 52       (goal LDL is <100)   AST (U/L)   Date Value   02/04/2021 18     ALT (U/L)   Date Value   02/04/2021 20     BUN (mg/dL)   Date Value   07/21/2021 30 (H)     BP Readings from Last 3 Encounters:   04/06/22 96/66   04/04/22 121/75   11/22/21 115/70          (goal 120/80)        Patient Active Problem List:     Cervical spondylosis     Type II or unspecified type diabetes mellitus without mention of complication, not stated as uncontrolled     Hypertension     Abnormal auditory perception     Eustachian tube dysfunction     Chronic serous otitis media     Nasal polyps     Nasal congestion     Osteoarthritis of left knee     Osteoarthritis of right knee     DIEGO (nonalcoholic steatohepatitis)     Vitamin D deficiency     Iron deficiency anemia     Dyslipidemia     Diabetes mellitus type 2, uncontrolled (HCC)     Left hip pain     Trochanteric bursitis     Fatigue     Daytime somnolence     Snoring     Chronic left shoulder pain     Restless legs syndrome     Generalized anxiety disorder     Primary osteoarthritis, left shoulder     Tendinopathy of left shoulder     Adhesive capsulitis of left shoulder     Lumbar radiculopathy      ----Bo Carr

## 2022-05-17 ENCOUNTER — HOSPITAL ENCOUNTER (OUTPATIENT)
Dept: PHYSICAL THERAPY | Age: 62
Setting detail: THERAPIES SERIES
Discharge: HOME | End: 2022-05-17
Payer: COMMERCIAL

## 2022-05-17 PROCEDURE — 97113 AQUATIC THERAPY/EXERCISES: CPT

## 2022-05-17 NOTE — FLOWSHEET NOTE
[address] 234 beds at the [facility]   [address]   Phone: [phone]   Fax: [phone]    Physical Therapy Daily Treatment Note      Date:  2022  Patient Name:  Amarilys Jones    :  1960  MRN: [id number]  Physician: Catrachito Mayes MD (resident physician)  Rajeev Clemente MD (attending physician)                                  Insurance: [employer] [address]. Auth required after 30 visits  Medical Diagnosis: M54.16 (ICD-10-CM) - Lumbar radiculopathy              Rehab Codes: M54.16  Onset Date: 2021 with recent exacerbation 2022                    Next 's appt: 22 for thoracic and cervical MRI. 22   Visit# / total visits:  Cancels/No Shows: 1/0    Subjective:  Was able to tolerate going to her daughters graduation this weekend however pain was increased to 8/10. Feels like she has turned a corner today- pain has been minimal , tolerating return to work well however still not sleeping well last few nights  Pain:  [x] Yes  [] No Location:(L) Lower back into buttock, posterior/lateral thigh Pain Rating: (0-10 scale) 3/10  Pain altered Tx:  [x] No  [] Yes  Action:  Comments: Emphasis on pain control, postural awareness and importance of core stability. Objective:  Modalities:   Precautions:  Exercises:    [address]  [employer] Exercise Log  Aquatic, Hip & DLS Program- Phase 1    Date of Eval:                                Primary PT: Wendy Schwartz PT  Diagnosis:   Things to Focus On (goals):   Surgical Precautions:  Medical Precautions:  [] C-9 dates  [] Occ Med   [] Medicare       Date 22    Visit #     Walk F/L/R 2 Laps @ Rail 2 Laps @ Rail 2 Laps @ Rail     1 Lap @ Rail 2 Laps @ Rail 2 Laps @ [facility] 5x3\" 5x3\" 9x3\"    Step-Ups F/L    Add   Step Down F/L       Heel-toe raises - -     SLR F/L/R 10x 10x 15x    Hip/Knee Flex/Ext 10x 10x 15x    F/L Lunges Kickboard Ex. Small Small Med    Iso Abd. Verticle 10x5\" 10x5\" 10x5\"    Push-pull 10x 10x 10x    Paddling              UE Format:       Horiz Abd/Add  10x 10x    IR/ER (wipers)   10x    Alt Flex/Ext  10x 10x    Alt Press Down   10x    Abd/Add  10x 10x           Deep Water: 1 Noodle 1 Noodle 1 Noodle    Hang 5' 5'     Cycling 2' 2' 2'    Jacks 1' 1' 1'    X-Country 1' 1' 1'           Balance       SLS              Stretches       Achllies       Hamstring 2x20\"  1st step 2x20\"  1st Step 2x20\"  1st Step    Piriformis              Cool Down 1 Lap NT 1 Lap    Pain Rating 5 6 3        Specific Instructions for next treatment: Attempt forward step ups    Assessment: [x] Progressing toward goals. Emphasis throughout program on posture and technique. Requires UE assist at rail in 30% WB Aquatic environment during walking and LE exercise due to imbalance/fear of LE's giving out. Increased reps this date and progressed resistance with kickboard activity to challenge core. Resumed cool down lap without issue and progressed UE format with long and short lever exercise for improve trunk stability. Patient tolerated well overall. Continues with slow progression overall and remains guarded with movement- especially squats/heel toe raises. Finished with deep water unloading/aerobic exercise to relief pressure. [] No change. [] Other:  Skilled PT intervention is required to help centralize/modulate sx, improve pain free lumbar AROM and maximize neuromuscular strength & stability necessary for unrestricted ADL/ work tolerances at her prior level of function. STG: (to be met in 6 treatments)  1. Pt will self report worst pain no greater than 3/10 in order to better tolerate ADLs/work activities with minimal dysfunction GOAL PROGRESSING AND EXTENDED THROUGH POC 5/9  2.  Pt will improve lumbar AROM to 90% or greater in all planes in order to demonstrate ability to move/reach in all planes unrestricted at PLOF GOAL PROGRESSING AND EXTENDED THROUGH POC 5/9  3. Pt will improve trunk strength to 4/5 or greater in all planes to show improved stability at prior level GOAL PROGRESSING 5/9  4. Pt will self report reduced peripheral L LE pain (no distal than L knee) to demonstrate initial  favorable centralization response to therapy GOAL MET 5/9  LTG: (to be met in 12 treatments)  1. Pt will demonstrate improved B LE strength to 4/5 or greater in order to demonstrate improved stability/strength necessary for unrestricted ADLs/work activities GOAL PROGRESSING 5/9  2. Pt will self report ability to complete all work duties with pain no greater than 1/10 to demonstrate unrestricted functional tolerances at prior level GOAL PROGRESSING 5/9  3. Pt will improve core strength Sahrmann Grade 2/5 or greater to demonstrate better support and stability to lumbar spine  4. Pt will decrease SHEY to 10% impaired or less in order to demonstrate improved functional tolerances at PLOF with minimal restriction/dysfunction  5. Pt will demonstrate independence with a long term HEP for continued progress/maintenance after completion of PT    Patient Goals/Rehab Expectations: To alleviate pain and get back to normal    Pt. Education:  [x] Yes  [] No  [] Reviewed Prior HEP/Ed  Method of Education: [x] Verbal  [x] Demo  [] Written  Comprehension of Education: Benefits of aquatics and postural awareness  [x] Verbalizes understanding. [x] Demonstrates understanding. [] Needs review. [] Demonstrates/verbalizes HEP/Ed previously given. Plan: [x] Continue per plan of care.    [] Other:       Treatment Charges: Mins Units   []  Modalities     []  Ther Exercise     []  Manual Therapy     []  Ther Activities     [x]  Aquatics 45 3   []  Neuromuscular     [] Vasocompression     [] Gait Training     [] Dry needling        [] 1 or 2 muscles        [] 3 or more muscles     []  Other     Total Treatment time 45 3     Time In: 406 PM            Time Out: 528 PM      Electronically signed by:  Tamika Salazar PTA

## 2022-05-19 ENCOUNTER — HOSPITAL ENCOUNTER (OUTPATIENT)
Dept: PHYSICAL THERAPY | Age: 62
Setting detail: THERAPIES SERIES
Discharge: HOME | End: 2022-05-19
Payer: COMMERCIAL

## 2022-05-19 PROCEDURE — 97113 AQUATIC THERAPY/EXERCISES: CPT

## 2022-05-19 NOTE — FLOWSHEET NOTE
[address] [facility]   [address]   Phone: [phone]   Fax: [phone]    Physical Therapy Daily Treatment Note      Date:  2022  Patient Name:  Tiarra Tran    :  1960  MRN: [id number]  Physician: Isaac Mane MD (resident physician)  Bebeto Peña MD (attending physician)                                  Insurance: [employer]. Auth required after 30 visits  Medical Diagnosis: M54.16 (ICD-10-CM) - Lumbar radiculopathy              Rehab Codes: M54.16  Onset Date: 2021 with recent exacerbation 2022                    Next 's appt: 22 for thoracic and cervical MRI. 22   Visit# / total visits:  Cancels/No Shows: 1/0    Subjective:    : Patient report aquatic continues to help improve over all symptoms. Patient states she had some residual pain from activities the other day but short lived. Pain:  [x] Yes  [] No Location:(L) Lower back into buttock, posterior/lateral thigh Pain Rating: (0-10 scale) 3/10  Pain altered Tx:  [x] No  [] Yes  Action:  Comments:     Objective:  Modalities:   Precautions:  Exercises:    [address] [facility] Exercise Log  Aquatic, Hip & DLS Program- Phase 1    Date of Eval:                                Primary PT: Srikanth Patel, PT  Diagnosis: Things to Focus On (goals):   Surgical Precautions:  Medical Precautions:  [] C-9 dates  [] Occ Med   [] Medicare       Date 22   Visit #    Walk F/L/R 2 Laps @ Rail 2 Laps @ Rail 2 Laps @ Rail 2 Laps @ Rail    1 Lap @ Rail 2 Laps @ Rail 2 Laps @ Rail 2 Laps @ [address] 5x3\" 5x3\" 9x3\" 10x3\"   Step-Ups F/L    Low 10x F   Step Down F/L       Heel-toe raises - -     SLR F/L/R 10x 10x 15x 15x   Hip/Knee Flex/Ext 10x 10x 15x 15x   F/L Lunges              Kickboard Ex. Small Small Med Med   Iso Abd.  Verticle 10x5\" 10x5\" 10x5\" 10x5\"   Push-pull 10x 10x 10x Patient Education     Paronychia of the Finger or Toe  Paronychia is an infection near a fingernail or toenail. It usually occurs when an opening in the cuticle or an ingrown toenail lets bacteria under the skin.   The infection will need to be drained if pus is present. If the infection has been caught early, you may need only antibiotic treatment. Healing will take about 1 to 2 weeks.   Home care  Follow these guidelines when caring for yourself at home:   · Clean and soak the toe or finger. Do this 2 times a day for the first 3 days. To do so:  ? Soak your foot or hand in a tub of warm water for 5 minutes. Or hold your toe or finger under a faucet of warm running water for 5 minutes.  ? Clean any crust away with soap and water using a cotton swab.  ? Put antibiotic ointment on the infected area.  · Change the dressing daily or any time it gets dirty.  · If you were given antibiotics, take them as directed until they are all gone.  · If your infection is on a toe, wear comfortable shoes with a lot of toe room. You can also wear open-toed sandals while your toe heals.  · You may use over-the-counter medicine (acetaminophen or ibuprofen) to help with pain, unless another medicine was prescribed. If you have chronic liver or kidney disease, talk with your healthcare provider before using these medicines. Also talk with your provider if you've had a stomach ulcer or gastrointestinal bleeding.  Prevention  The following can prevent paronychia:   · Don't cut or play with your cuticles at home. A healthy cuticle maintains a seal between your skin and nail and keeps out infection.  · Don't bite your nails.  · Don't suck on your thumbs or fingers.  Follow-up care  Follow up with your healthcare provider, or as advised.   When to seek medical advice  Call your healthcare provider right away if any of these occur:   · Redness, pain, or swelling of the finger or toe gets worse  · You have trouble moving or bending the finger  10x   Paddling              UE Format:       Horiz Abd/Add  10x 10x 12x   IR/ER (wipers)   10x 12x   Alt Flex/Ext  10x 10x 12x   Alt Press Down   10x 12x   Abd/Add  10x 10x 12x          Deep Water: 1 Noodle 1 Noodle 1 Noodle 1 Noodle      Hang 5' 5'  5'   Cycling 2' 2' 2' 2'   Jacks 1' 1' 1' 1'   X-Country 1' 1' 1' 1'          Balance       SLS              Stretches       Achllies       Hamstring 2x20\"  1st step 2x20\"  1st Step 2x20\"  1st Step 2x20\"  1st Step   Piriformis              Cool Down 1 Lap NT 1 Lap 1 lap    Pain Rating 5 6 3 3       Specific Instructions for next treatment: Attempt forward step ups    Assessment: [x] Progressing toward goals. 5/19: Patient continues to required cueing for core engagement and proper technique of exercises. Patient continues to require UE assist at rail due to fear of falling. Able to progress with addition of step ups, and increase in UE format repetitions to continue working on overall strength and stability. [] No change. [] Other:  Skilled PT intervention is required to help centralize/modulate sx, improve pain free lumbar AROM and maximize neuromuscular strength & stability necessary for unrestricted ADL/ work tolerances at her prior level of function. STG: (to be met in 6 treatments)  1. Pt will self report worst pain no greater than 3/10 in order to better tolerate ADLs/work activities with minimal dysfunction GOAL PROGRESSING AND EXTENDED THROUGH POC 5/9  2. Pt will improve lumbar AROM to 90% or greater in all planes in order to demonstrate ability to move/reach in all planes unrestricted at [facility] POC 5/9  3. Pt will improve trunk strength to 4/5 or greater in all planes to show improved stability at prior level GOAL PROGRESSING 5/9  4.  Pt will self report reduced peripheral L LE pain (no distal than L knee) to demonstrate initial  favorable centralization response to therapy GOAL MET 5/9  LTG: (to be met or toe  · Red streaks in the skin leading away from the wound  · Pus or fluid draining from the nail area  · Fever of 100.4ºF (38ºC) or higher, or as directed by your provider  Reymundo last reviewed this educational content on 7/1/2019 © 2000-2021 [employer]. All rights reserved. This information is not intended as a substitute for professional medical care. Always follow your healthcare professional's instructions.

## 2022-05-20 ENCOUNTER — HOSPITAL ENCOUNTER (OUTPATIENT)
Dept: PAIN MANAGEMENT | Age: 62
Discharge: HOME | End: 2022-05-20
Payer: COMMERCIAL

## 2022-05-20 VITALS
HEART RATE: 87 BPM | SYSTOLIC BLOOD PRESSURE: 121 MMHG | TEMPERATURE: 97.5 F | DIASTOLIC BLOOD PRESSURE: 70 MMHG | OXYGEN SATURATION: 97 %

## 2022-05-20 DIAGNOSIS — M54.16 LUMBAR RADICULOPATHY: Primary | ICD-10-CM

## 2022-05-20 PROCEDURE — 99213 OFFICE O/P EST LOW 20 MIN: CPT | Performed by: NURSE PRACTITIONER

## 2022-05-20 PROCEDURE — 99213 OFFICE O/P EST LOW 20 MIN: CPT

## 2022-05-20 ASSESSMENT — ENCOUNTER SYMPTOMS
SHORTNESS OF BREATH: 0
BOWEL INCONTINENCE: 0
COUGH: 0
BACK PAIN: 1
CONSTIPATION: 0

## 2022-05-20 ASSESSMENT — PAIN SCALES - GENERAL: PAINLEVEL_OUTOF10: 3

## 2022-05-20 NOTE — PROGRESS NOTES
Chief Complaint   Patient presents with    Back Pain    Medication Refill       [facility]  Pt complains of back pain. MRI lumbar with Broad-based left subarticular disc protrusion at L5-S1 abutting the transversing left S1 nerve root. She has seen a surgeon -  Dr. Skylar Jones - who recommended lumbar epidural steroid injections. She completed aquatic therapy with benefit. She had LESI 21 and reported 75-80% relief of back pain. Pain did return in February. She started aquatic PT again and is seeing a chiropractor and feels this is managing her pain. She does not feel she needs to repeat injections at this time. Back Pain  This is a chronic problem. The current episode started more than 1 year ago. The problem occurs intermittently. The problem has been gradually improving since onset. The pain is present in the lumbar spine (sciatica). The pain radiates to the left thigh. The pain is at a severity of 3/10. The pain is mild. The pain is worse during the day. The symptoms are aggravated by sitting and standing. Associated symptoms include weakness. Pertinent negatives include no bladder incontinence, bowel incontinence, chest pain, fever, headaches, numbness or tingling. She has tried analgesics and ice (Aquatics) for the symptoms. The treatment provided mild relief. Patient denies any new neurological symptoms. No bowel or bladder incontinence, no weakness, and no falling.       Past Medical History:   Diagnosis Date    Cervical spondylosis 2009    c-4 c-5, c-5 c-6, c-6 c-7    Generalized anxiety disorder 2020    Hyperlipidemia     Hypertension     Lumbar radiculopathy     DIEGO (nonalcoholic steatohepatitis) 10/12/2014    Obesity     Osteoarthritis of left knee 2014    Restless legs syndrome     Type II or unspecified type diabetes mellitus without mention of complication, not stated as uncontrolled        Past Surgical History:   Procedure Laterality Date     SECTION      COLONOSCOPY  09/17/2012    [facility]    HYSTERECTOMY, TOTAL ABDOMINAL  2008    Polymenorrhagia    SHOULDER SURGERY Left 02/17/2021     SHOULDER MANIPULATION WITH PRE OP INTERSCALENE BLOCK     SHOULDER SURGERY Left 2/17/2021    SHOULDER MANIPULATION WITH PRE OP INTERSCALENE BLOCK and intraop injection performed by Josefina Nair DO at [address] [place] [address]  07-    [facility]       Allergies   Allergen Reactions    Codeine Nausea And Vomiting         Current Outpatient Medications:     rosuvastatin (CRESTOR) 20 MG tablet, TAKE ONE TABLET BY MOUTH NIGHTLY, Disp: 30 tablet, Rfl: 2    celecoxib (CELEBREX) 100 MG capsule, Take 1 capsule by mouth 2 times daily, Disp: 120 capsule, Rfl: 1    ASPIRIN LOW DOSE 81 MG EC tablet, TAKE 1 TABLET BY MOUTH ONE TIME A DAY, Disp: 30 tablet, Rfl: 2    gabapentin (NEURONTIN) 100 MG capsule, Take one cap at 4 pm and 8 pm, Disp: 60 capsule, Rfl: 3    tiZANidine (ZANAFLEX) 2 MG tablet, Take one tab at 4 pm and 8 pm as needed for muscle spasms, Disp: 60 tablet, Rfl: 1    ketorolac (TORADOL) 10 MG tablet, Take 1 tablet by mouth every 6 hours as needed for Pain, Disp: 20 tablet, Rfl: 0    ketorolac (TORADOL) 30 MG/ML injection, Inject 1 mL into the muscle once for 1 dose, Disp: 1 mL, Rfl: 0    gabapentin (NEURONTIN) 300 MG capsule, Take 1 capsule by mouth 3 times daily for 30 days. , Disp: 90 capsule, Rfl: 3    lisinopril (PRINIVIL;ZESTRIL) 40 MG tablet, Take 1 tablet by mouth daily, Disp: 90 tablet, Rfl: 2    glipiZIDE (GLUCOTROL) 10 MG tablet, Take 1 tab bid, Disp: 180 tablet, Rfl: 1    busPIRone (BUSPAR) 10 MG tablet, Take 1 tab three times daily as needed, Disp: 270 tablet, Rfl: 3    vitamin D (ERGOCALCIFEROL) 1.25 MG (03952 UT) CAPS capsule, TAKE ONE CAPSULE BY MOUTH ONCE A WEEK (EVERY 7 DAYS), Disp: 24 capsule, Rfl: 0    TRULICITY 1.5 JS/2.2UK SOPN, INJECT THE CONTENTS OF ONE SYRINGE UNDER THE SKIN ONCE WEEKLY, Disp: 6 mL, Rfl: 2    chlorthalidone (HYGROTON) 25 MG tablet, TAKE 1 TABLET BY MOUTH ONE TIME A DAY, Disp: 90 tablet, Rfl: 1    SYNJARDY 5-1000 MG TABS, TAKE 1 TABLET BY MOUTH 2 TIMES A DAY, Disp: 180 tablet, Rfl: 2    omeprazole (PRILOSEC) 20 MG delayed release capsule, Take 1 capsule by mouth 2 times daily, Disp: 180 capsule, Rfl: 3    rOPINIRole (REQUIP) 2 MG tablet, TAKE 2 TABLETS BY MOUTH NIGHTLY, Disp: 180 tablet, Rfl: 3    albuterol sulfate HFA (PROVENTIL HFA) 108 (90 Base) MCG/ACT inhaler, Inhale 2 puffs into the lungs every 4 hours as needed for Wheezing, Disp: 2 Inhaler, Rfl: 5    blood glucose test strips (PRODIGY NO CODING BLOOD GLUC) strip, Use to test once daily and as needed as directed by provider, Disp: 100 each, Rfl: 3    diclofenac sodium (VOLTAREN) 1 % GEL, Apply 2 g topically 2 times daily (Patient taking differently: Apply 2 g topically 2 times daily Prn pain), Disp: 100 g, Rfl: 2    acetaminophen (ACETAMINOPHEN EXTRA STRENGTH) 500 MG tablet, Take 1,000 mg by mouth daily as needed for Pain, Disp: , Rfl:     bimatoprost (LUMIGAN) 0.01 % SOLN ophthalmic drops, Place 1 drop into both eyes nightly, Disp: , Rfl:     PRODIGY LANCETS 28G MISC, 1 Bottle by Does not apply route three times daily, Disp: 100 each, Rfl: 3    Blood Glucose Monitoring Suppl (PRODIGY AUTOCODE BLOOD GLUCOSE) w/Device KIT, 1 Device by Does not apply route 3 times daily, Disp: 1 kit, Rfl: 0    Family History   Problem Relation Age of Onset    Heart Attack Mother     Diabetes Mother     Diabetes Father     Heart Attack Father        Social History     Socioeconomic History    Marital status:      Spouse name: Not on file    Number of children: Not on file    Years of education: Not on file    Highest education level: Not on file   Occupational History    Not on file   Tobacco Use    Smoking status: Never Smoker    Smokeless tobacco: Never Used   Vaping Use    Vaping Use: Never used   Substance and Sexual Activity    Alcohol use: Yes     Alcohol/week: 0.0 standard drinks     Comment: rarely/ 4 times a year    Drug use: No    Sexual activity: Yes   Other Topics Concern    Not on file   Social History Narrative    Not on file     Social Determinants of Health     Financial Resource Strain: Low Risk     Difficulty of Paying Living Expenses: Not very hard   Food Insecurity: No Food Insecurity    Worried About Running Out of Food in the Last Year: Never true    Valerie of Food in the Last Year: Never true   Transportation Needs:     Lack of Transportation (Medical): Not on file    Lack of Transportation (Non-Medical): Not on file   Physical Activity:     Days of Exercise per Week: Not on file    Minutes of Exercise per Session: Not on file   Stress:     Feeling of Stress : Not on file   Social Connections:     Frequency of Communication with Friends and Family: Not on file    Frequency of Social Gatherings with Friends and Family: Not on file    Attends Catholic Services: Not on file    Active Member of [address] [place] [address] or Organizations: Not on file    Attends Club or Organization Meetings: Not on file    Marital Status: Not on file   Intimate Partner Violence:     Fear of Current or Ex-Partner: Not on file    Emotionally Abused: Not on file    Physically Abused: Not on file    Sexually Abused: Not on file   Housing Stability:     Unable to Pay for Housing in the Last Year: Not on file    Number of [place] in the Last Year: Not on file    Unstable Housing in the Last Year: Not on file       Review of Systems:  Review of Systems   Constitutional: Negative for chills and fever. Cardiovascular: Negative for chest pain and palpitations. Respiratory: Negative for cough and shortness of breath. Musculoskeletal: Positive for back pain, joint pain and neck pain. Gastrointestinal: Negative for bowel incontinence and constipation. Genitourinary: Negative for bladder incontinence.    Neurological: Positive for weakness. Negative for disturbances in coordination, headaches, loss of balance, numbness and tingling. Physical Exam:  /70   Pulse 87   Temp 97.5 °F (36.4 °C)   SpO2 97%     Physical Exam  HENT:      Head: Normocephalic. Pulmonary:      Effort: Pulmonary effort is normal.   Musculoskeletal:         General: Normal range of motion. Cervical back: Normal range of motion. Lumbar back: Tenderness present. Skin:     General: Skin is warm and dry. Neurological:      Mental Status: She is alert and oriented to person, place, and time. Record/Diagnostics Review:    FINDINGS:   BONES/ALIGNMENT: There is normal alignment of the spine. The vertebral body   heights are maintained. The bone marrow signal appears unremarkable.       SPINAL CORD: The conus terminates normally.       SOFT TISSUES: No paraspinal mass identified.       L1-L2: There is no significant disc herniation, spinal canal stenosis or   neural foraminal narrowing.       L2-L3: There is no significant disc herniation, spinal canal stenosis or   neural foraminal narrowing.       L3-L4: Disc protrusion, facet DJD, and ligamentum flavum hypertrophy with   mild spinal canal stenosis.  No significant neural foraminal stenosis.       L4-L5: There is no significant disc herniation, spinal canal stenosis or   neural foraminal narrowing.       L5-S1: There is no significant disc herniation, spinal canal stenosis or   neural foraminal narrowing.           Impression   Mild degenerative change at L3-4 with mild spinal canal stenosis.  No   significant neural foraminal narrowing.          Assessment:  Problem List Items Addressed This Visit     Lumbar radiculopathy - Primary             Treatment Plan:  Continue PT  Continue chiropractic care  Follow up as needed    I have reviewed the chief complaint and history of present illness (including ROS and PFSH) and vital documentation by my staff and I agree with their

## 2022-05-24 ENCOUNTER — APPOINTMENT (OUTPATIENT)
Dept: PHYSICAL THERAPY | Age: 62
End: 2022-05-24
Payer: COMMERCIAL

## 2022-05-26 ENCOUNTER — OFFICE VISIT (OUTPATIENT)
Dept: FAMILY MEDICINE CLINIC | Age: 62
End: 2022-05-26
Payer: COMMERCIAL

## 2022-05-26 ENCOUNTER — APPOINTMENT (OUTPATIENT)
Dept: PHYSICAL THERAPY | Age: 62
End: 2022-05-26
Payer: COMMERCIAL

## 2022-05-26 ENCOUNTER — HOSPITAL ENCOUNTER (OUTPATIENT)
Dept: PHYSICAL THERAPY | Age: 62
Setting detail: THERAPIES SERIES
Discharge: HOME | End: 2022-05-26
Payer: COMMERCIAL

## 2022-05-26 VITALS
DIASTOLIC BLOOD PRESSURE: 67 MMHG | BODY MASS INDEX: 37.61 KG/M2 | TEMPERATURE: 96.7 F | HEIGHT: 62 IN | HEART RATE: 87 BPM | WEIGHT: 204.4 LBS | SYSTOLIC BLOOD PRESSURE: 117 MMHG

## 2022-05-26 DIAGNOSIS — E11.9 TYPE 2 DIABETES MELLITUS WITHOUT COMPLICATION, WITHOUT LONG-TERM CURRENT USE OF INSULIN (HCC): Primary | ICD-10-CM

## 2022-05-26 DIAGNOSIS — Z13.220 SCREENING FOR HYPERLIPIDEMIA: ICD-10-CM

## 2022-05-26 DIAGNOSIS — E55.9 VITAMIN D DEFICIENCY: ICD-10-CM

## 2022-05-26 LAB — HBA1C MFR BLD: 7.9 %

## 2022-05-26 PROCEDURE — 99213 OFFICE O/P EST LOW 20 MIN: CPT | Performed by: STUDENT IN AN ORGANIZED HEALTH CARE EDUCATION/TRAINING PROGRAM

## 2022-05-26 PROCEDURE — 3051F HG A1C>EQUAL 7.0%<8.0%: CPT | Performed by: STUDENT IN AN ORGANIZED HEALTH CARE EDUCATION/TRAINING PROGRAM

## 2022-05-26 PROCEDURE — 83036 HEMOGLOBIN GLYCOSYLATED A1C: CPT | Performed by: STUDENT IN AN ORGANIZED HEALTH CARE EDUCATION/TRAINING PROGRAM

## 2022-05-26 PROCEDURE — 97113 AQUATIC THERAPY/EXERCISES: CPT

## 2022-05-26 ASSESSMENT — PATIENT HEALTH QUESTIONNAIRE - PHQ9
SUM OF ALL RESPONSES TO PHQ QUESTIONS 1-9: 3
2. FEELING DOWN, DEPRESSED OR HOPELESS: 0
1. LITTLE INTEREST OR PLEASURE IN DOING THINGS: 3
SUM OF ALL RESPONSES TO PHQ QUESTIONS 1-9: 3
SUM OF ALL RESPONSES TO PHQ9 QUESTIONS 1 & 2: 3
SUM OF ALL RESPONSES TO PHQ QUESTIONS 1-9: 3
SUM OF ALL RESPONSES TO PHQ QUESTIONS 1-9: 3

## 2022-05-26 ASSESSMENT — ENCOUNTER SYMPTOMS
SHORTNESS OF BREATH: 0
ABDOMINAL PAIN: 0

## 2022-05-26 NOTE — PATIENT INSTRUCTIONS
Thank you for letting us take care of you today. We hope all your questions were addressed. If a question was overlooked or something else comes to mind after you return home, please contact a member of your Care Team listed below. Your Care Team at [place] [address] is Team #1  Manjit Yin MD (Faculty)  Jaelyn Qiunn MD(Resident)  Bruce Magana MD (Resident)  Vanda Sosa., UMESH Wiggins., Lizzette Richard., [facility] office)  Denver Zhang, [address] [place], [place] [address] [facility] (Clinical Practice Manager)  Doreene Phalen, [facility] (Clinical Pharmacist)     Office phone number: [phone]    If you need to get in right away due to illness, please be advised we have \"Same Day\" appointments available Monday-Friday. Please call us at [phone] option #3 to schedule your \"Same Day\" appointment.

## 2022-05-26 NOTE — PROGRESS NOTES
DIABETES and HYPERTENSION visit    BP Readings from Last 3 Encounters:   05/20/22 121/70   04/06/22 96/66   04/04/22 121/75        Hemoglobin A1C (%)   Date Value   09/14/2021 7.7   06/24/2021 7.9 (H)   04/20/2021 7.3     Microalb/Crt. Ratio (mcg/mg creat)   Date Value   06/11/2021 CANNOT BE CALCULATED     LDL Cholesterol (mg/dL)   Date Value   06/24/2021 52     HDL (mg/dL)   Date Value   06/24/2021 59     BUN (mg/dL)   Date Value   07/21/2021 30 (H)     CREATININE (mg/dL)   Date Value   07/21/2021 1.01 (H)     Glucose (mg/dL)   Date Value   07/21/2021 159 (H)   05/19/2012 137 (H)            Have you changed or started any medications since your last visit including any over-the-counter medicines, vitamins, or herbal medicines? no   Have you stopped taking any of your medications? Is so, why? -  no  Are you having any side effects from any of your medications? - no    Have you seen any other physician or provider since your last visit? Dr. Jenae Baugh, Pain Management, Neurology, chiropractor. Have you had any other diagnostic tests since your last visit?  no   Have you been seen in the emergency room and/or had an admission in a hospital since we last saw you?  no   Have you had your routine dental cleaning in the past 6 months?  no     Have you had your annual diabetic retinal (eye) exam? Yes   (ensure copy of exam is in the chart)    Do you have an active [employer] account? If no, what is the barrier?   Yes    Patient Care Team:  Buddy Arora MD as PCP - General (Family Medicine)  Buddy Arora MD as PCP - [facility]  Jason Edmonds MD as Consulting Physician (Gastroenterology)    Medical History Review  Past Medical, Family, and Social History reviewed and does not contribute to the patient presenting condition    Health Maintenance   Topic Date Due    Shingles vaccine (1 of 2) Never done    Pneumococcal 0-64 years Vaccine (2 - PCV) 11/27/2016    Diabetic retinal exam  11/01/2017    Diabetic foot exam  12/01/2017    DTaP/Tdap/Td vaccine (2 - Tdap) 12/15/2020    COVID-19 Vaccine (3 - Booster for Moderna series) 07/11/2021    Diabetic microalbuminuria test  06/11/2022    Lipids  06/24/2022    A1C test (Diabetic or Prediabetic)  09/14/2022    Colorectal Cancer Screen  09/20/2022    Breast cancer screen  11/14/2022    Depression Screen  04/04/2023    Flu vaccine  Completed    Hepatitis C screen  Completed    HIV screen  Completed    Hepatitis A vaccine  Aged Out    Hib vaccine  Aged Out    Meningococcal (ACWY) vaccine  Aged Out

## 2022-05-26 NOTE — FLOWSHEET NOTE
Push-pull 10x 10x 10x 10x 10x   Paddling                UE Format:        Horiz Abd/Add  10x 10x 12x 12x   IR/ER (wipers)   10x 12x 12x   Alt Flex/Ext  10x 10x 12x 12x   Alt Press Down   10x 12x 12x   Abd/Add  10x 10x 12x 12x           Deep Water: 1 Noodle 1 Noodle 1 Noodle 1 Noodle    1 Noodle   Hang 5' 5'  5' 5'   Cycling 2' 2' 2' 2' 2'   Jacks 1' 1' 1' 1' 1'   X-Country 1' 1' 1' 1' 1'           Balance        SLS                Stretches        Achllies        Hamstring 2x20\"  1st step 2x20\"  1st Step 2x20\"  1st Step 2x20\"  1st Step 2x20\"  1st Step   Piriformis                Cool Down 1 Lap NT 1 Lap 1 lap  1 lap    Pain Rating 5 6 3 3 4       Specific Instructions for next treatment: Attempt forward step ups    Assessment: [x] Progressing toward goals. 5/26: Patient initiated session with slow guarded movements secondary to higher pain levels today. As session progressed Patient notes less pain especially after deep water hang. Less cueing required for postural awareness as patient self corrected multiple times. [] No change. [] Other:  Skilled PT intervention is required to help centralize/modulate sx, improve pain free lumbar AROM and maximize neuromuscular strength & stability necessary for unrestricted ADL/ work tolerances at her prior level of function. STG: (to be met in 6 treatments)  1. Pt will self report worst pain no greater than 3/10 in order to better tolerate ADLs/work activities with minimal dysfunction GOAL PROGRESSING AND EXTENDED THROUGH POC 5/9  2. Pt will improve lumbar AROM to 90% or greater in all planes in order to demonstrate ability to move/reach in all planes unrestricted at [facility] POC 5/9  3. Pt will improve trunk strength to 4/5 or greater in all planes to show improved stability at prior level GOAL PROGRESSING 5/9  4.  Pt will self report reduced peripheral L LE pain (no distal than L knee) to demonstrate initial  favorable centralization response to therapy GOAL MET 5/9  LTG: (to be met in 12 treatments)  1. Pt will demonstrate improved B LE strength to 4/5 or greater in order to demonstrate improved stability/strength necessary for unrestricted ADLs/work activities GOAL PROGRESSING 5/9  2. Pt will self report ability to complete all work duties with pain no greater than 1/10 to demonstrate unrestricted functional tolerances at prior level GOAL PROGRESSING 5/9  3. Pt will improve core strength Sahrmann Grade 2/5 or greater to demonstrate better support and stability to lumbar spine  4. Pt will decrease SHEY to 10% impaired or less in order to demonstrate improved functional tolerances at PLOF with minimal restriction/dysfunction  5. Pt will demonstrate independence with a long term HEP for continued progress/maintenance after completion of PT    Patient Goals/Rehab Expectations: To alleviate pain and get back to normal    Pt. Education:  [x] Yes  [] No  [] Reviewed Prior HEP/Ed  Method of Education: [x] Verbal  [x] Demo  [] Written  Comprehension of Education: Benefits of aquatics and postural awareness  [x] Verbalizes understanding. [x] Demonstrates understanding. [] Needs review. [] Demonstrates/verbalizes HEP/Ed previously given. Plan: [x] Continue per plan of care.    [] Other:       Treatment Charges: Mins Units   []  Modalities     []  Ther Exercise     []  Manual Therapy     []  Ther Activities     [x]  Aquatics 45 3   []  Neuromuscular     [] Vasocompression     [] Gait Training     [] Dry needling        [] 1 or 2 muscles        [] 3 or more muscles     []  Other     Total Treatment time 45 3     Time In: 880 PM            Time Out:500 PM      Electronically signed by:  Carol Jenkins PTA

## 2022-05-26 NOTE — PROGRESS NOTES
Subjective:    Morena Vigil is a 65 y.o. female with  has a past medical history of Cervical spondylosis, Generalized anxiety disorder, Hyperlipidemia, Hypertension, Lumbar radiculopathy, DIEGO (nonalcoholic steatohepatitis), Obesity, Osteoarthritis of left knee, Restless legs syndrome, and Type II or unspecified type diabetes mellitus without mention of complication, not stated as uncontrolled. Presented to the office today for:  Chief Complaint   Patient presents with    Diabetes     A1C     Hypertension     BP check     Back Pain     unable to things because of the pain    Health Maintenance     Positive PHQ-9, mammogram pended, declined vaccines        HPI    65year old female here today to follow up for HTN and DM2. No acute concerns today. HTN - controlled, currently on chlorthalidone 25 mg PO daily, lisinopril 40 mg PO daily. Continue current regimen. DM2 - A1c 7.9. Continue current regimen. Currently on glipizide, trulicity, synjardy. Will be following up for eye exam and podiatry. Will update labs. Deferred other HM till next visit. Review of Systems   Constitutional: Negative for chills and fever. Respiratory: Negative for shortness of breath. Cardiovascular: Negative for chest pain. Gastrointestinal: Negative for abdominal pain. The patient has a   Family History   Problem Relation Age of Onset    Heart Attack Mother     Diabetes Mother     Diabetes Father     Heart Attack Father        Objective:    /67 (Site: Right Lower Arm, Position: Sitting, Cuff Size: Medium Adult) Comment: machine  Pulse 87   Temp (!) 96.7 °F (35.9 °C) (Temporal)   Ht 5' 2\" (1.575 m)   Wt 204 lb 6.4 oz (92.7 kg)   BMI 37.39 kg/m²    BP Readings from Last 3 Encounters:   05/26/22 117/67   05/20/22 121/70   04/06/22 96/66       Physical Exam  HENT:      Head: Normocephalic and atraumatic. Cardiovascular:      Rate and Rhythm: Normal rate and regular rhythm.       Pulses: Normal pulses. Heart sounds: Normal heart sounds. Pulmonary:      Effort: Pulmonary effort is normal. No respiratory distress. Breath sounds: Normal breath sounds. No wheezing. Abdominal:      Palpations: Abdomen is soft. Tenderness: There is no abdominal tenderness. There is no guarding. Musculoskeletal:      Right lower leg: No edema. Left lower leg: No edema. Skin:     General: Skin is warm and dry. Neurological:      General: No focal deficit present. Mental Status: She is alert and oriented to person, place, and time. Lab Results   Component Value Date    WBC 10.2 07/21/2021    HGB 11.1 (L) 07/21/2021    HCT 37.1 07/21/2021     07/21/2021    CHOL 154 06/24/2021    TRIG 217 (H) 06/24/2021    HDL 59 06/24/2021    LDLDIRECT 156 (H) 05/20/2017    ALT 20 02/04/2021    AST 18 02/04/2021     07/21/2021    K 4.6 07/21/2021     07/21/2021    CREATININE 1.01 (H) 07/21/2021    BUN 30 (H) 07/21/2021    CO2 23 07/21/2021    TSH 2.25 08/04/2020    INR 1.0 07/23/2013    GLUF 262 (H) 09/10/2018    LABA1C 7.9 05/26/2022    LABMICR CANNOT BE CALCULATED 06/11/2021     Lab Results   Component Value Date    CALCIUM 9.1 07/21/2021    PHOS 3.1 07/21/2021     Lab Results   Component Value Date    LDLCHOLESTEROL 52 06/24/2021    LDLDIRECT 156 (H) 05/20/2017       Assessment and Plan:    1. Type 2 diabetes mellitus without complication, without long-term current use of insulin ([facility])  - A1c 7.9   - continue current regimen  - Microalbumin, Ur; Future  - POCT glycosylated hemoglobin (Hb A1C)  - Comprehensive Metabolic Panel; Future    2. Screening for hyperlipidemia  - Lipid Panel; Future    3. Vitamin D deficiency  - recheck Vit D  - Vitamin D 25 Hydroxy; Future      Requested Prescriptions      No prescriptions requested or ordered in this encounter       There are no discontinued medications.     Kavya received counseling on the following healthy behaviors: nutrition, exercise and medication adherence    Discussed use,benefit, and side effects of prescribed medications. Barriers to medication compliance addressed. All patient questions answered. Pt voiced understanding. Return in about 2 months (around 7/26/2022). Disclaimer: Some orall of this note was transcribed using voice-recognition software. This may cause typographical errors occasionally. Although all effort is made to fix these errors, please do not hesitate to contact our office if there Manual Jackson concern with the understanding of this note.

## 2022-05-26 NOTE — PROGRESS NOTES
I have reviewed and discussed key elements of Serge 40 with the resident including plan of care and follow up and agree with the care cullen plan. Past Medical History:   Diagnosis Date    Cervical spondylosis 4/2009    c-4 c-5, c-5 c-6, c-6 c-7    Generalized anxiety disorder 12/8/2020    Hyperlipidemia     Hypertension     Lumbar radiculopathy     DIEGO (nonalcoholic steatohepatitis) 10/12/2014    Obesity     Osteoarthritis of left knee 8/19/2014    Restless legs syndrome     Type II or unspecified type diabetes mellitus without mention of complication, not stated as uncontrolled        A1C today 7.9     Diagnosis Orders   1. Type 2 diabetes mellitus without complication, without long-term current use of insulin (HCC)  Microalbumin, Ur    POCT glycosylated hemoglobin (Hb A1C)    Comprehensive Metabolic Panel   2. Screening for hyperlipidemia  Lipid Panel   3.  Vitamin D deficiency  Vitamin D 25 Hydroxy

## 2022-05-31 ENCOUNTER — APPOINTMENT (OUTPATIENT)
Dept: GENERAL RADIOLOGY | Age: 62
DRG: 551 | End: 2022-05-31
Payer: COMMERCIAL

## 2022-05-31 ENCOUNTER — HOSPITAL ENCOUNTER (INPATIENT)
Age: 62
LOS: 4 days | Discharge: NURSING, REHAB OR LONG-TERM CARE | DRG: 551 | End: 2022-06-10
Attending: STUDENT IN AN ORGANIZED HEALTH CARE EDUCATION/TRAINING PROGRAM | Admitting: INTERNAL MEDICINE
Payer: COMMERCIAL

## 2022-05-31 DIAGNOSIS — M54.32 SCIATICA OF LEFT SIDE: Primary | ICD-10-CM

## 2022-05-31 DIAGNOSIS — M54.40 LOW BACK PAIN WITH SCIATICA, SCIATICA LATERALITY UNSPECIFIED, UNSPECIFIED BACK PAIN LATERALITY, UNSPECIFIED CHRONICITY: ICD-10-CM

## 2022-05-31 DIAGNOSIS — R40.0 DAYTIME SOMNOLENCE: ICD-10-CM

## 2022-05-31 DIAGNOSIS — R53.83 FATIGUE, UNSPECIFIED TYPE: ICD-10-CM

## 2022-05-31 DIAGNOSIS — E66.09 CLASS 2 OBESITY DUE TO EXCESS CALORIES WITH BODY MASS INDEX (BMI) OF 39.0 TO 39.9 IN ADULT, UNSPECIFIED WHETHER SERIOUS COMORBIDITY PRESENT: ICD-10-CM

## 2022-05-31 DIAGNOSIS — M54.9 BACK PAIN, UNSPECIFIED BACK LOCATION, UNSPECIFIED BACK PAIN LATERALITY, UNSPECIFIED CHRONICITY: ICD-10-CM

## 2022-05-31 LAB
ABSOLUTE EOS #: 0 K/UL (ref 0–0.4)
ABSOLUTE LYMPH #: 1.1 K/UL (ref 1–4.8)
ABSOLUTE MONO #: 0.2 K/UL (ref 0.1–1.3)
ALBUMIN SERPL-MCNC: 4.5 G/DL (ref 3.5–5.2)
ALP BLD-CCNC: 84 U/L (ref 35–104)
ALT SERPL-CCNC: 36 U/L (ref 5–33)
ANION GAP SERPL CALCULATED.3IONS-SCNC: 16 MMOL/L (ref 9–17)
AST SERPL-CCNC: 27 U/L
BASOPHILS # BLD: 0 % (ref 0–2)
BASOPHILS ABSOLUTE: 0 K/UL (ref 0–0.2)
BILIRUB SERPL-MCNC: 0.31 MG/DL (ref 0.3–1.2)
BUN BLDV-MCNC: 34 MG/DL (ref 8–23)
CALCIUM SERPL-MCNC: 9 MG/DL (ref 8.6–10.4)
CHLORIDE BLD-SCNC: 100 MMOL/L (ref 98–107)
CO2: 23 MMOL/L (ref 20–31)
CREAT SERPL-MCNC: 1.58 MG/DL (ref 0.5–0.9)
EOSINOPHILS RELATIVE PERCENT: 0 % (ref 0–4)
GFR AFRICAN AMERICAN: 40 ML/MIN
GFR NON-AFRICAN AMERICAN: 33 ML/MIN
GFR SERPL CREATININE-BSD FRML MDRD: ABNORMAL ML/MIN/{1.73_M2}
GLUCOSE BLD-MCNC: 263 MG/DL (ref 65–105)
GLUCOSE BLD-MCNC: 278 MG/DL (ref 65–105)
GLUCOSE BLD-MCNC: 301 MG/DL (ref 70–99)
HCT VFR BLD CALC: 39.5 % (ref 36–46)
HEMOGLOBIN: 12.6 G/DL (ref 12–16)
LYMPHOCYTES # BLD: 11 % (ref 24–44)
MCH RBC QN AUTO: 28.2 PG (ref 26–34)
MCHC RBC AUTO-ENTMCNC: 31.9 G/DL (ref 31–37)
MCV RBC AUTO: 88.5 FL (ref 80–100)
MONOCYTES # BLD: 2 % (ref 1–7)
PDW BLD-RTO: 14 % (ref 11.5–14.9)
PLATELET # BLD: 365 K/UL (ref 150–450)
PMV BLD AUTO: 7.6 FL (ref 6–12)
POTASSIUM SERPL-SCNC: 5.2 MMOL/L (ref 3.7–5.3)
RBC # BLD: 4.47 M/UL (ref 4–5.2)
SEG NEUTROPHILS: 87 % (ref 36–66)
SEGMENTED NEUTROPHILS ABSOLUTE COUNT: 8.8 K/UL (ref 1.3–9.1)
SODIUM BLD-SCNC: 139 MMOL/L (ref 135–144)
TOTAL PROTEIN: 7.5 G/DL (ref 6.4–8.3)
WBC # BLD: 10.1 K/UL (ref 3.5–11)

## 2022-05-31 PROCEDURE — 6370000000 HC RX 637 (ALT 250 FOR IP): Performed by: NURSE PRACTITIONER

## 2022-05-31 PROCEDURE — 36415 COLL VENOUS BLD VENIPUNCTURE: CPT

## 2022-05-31 PROCEDURE — 6360000002 HC RX W HCPCS: Performed by: INTERNAL MEDICINE

## 2022-05-31 PROCEDURE — 6360000002 HC RX W HCPCS: Performed by: STUDENT IN AN ORGANIZED HEALTH CARE EDUCATION/TRAINING PROGRAM

## 2022-05-31 PROCEDURE — G0378 HOSPITAL OBSERVATION PER HR: HCPCS

## 2022-05-31 PROCEDURE — 6370000000 HC RX 637 (ALT 250 FOR IP): Performed by: STUDENT IN AN ORGANIZED HEALTH CARE EDUCATION/TRAINING PROGRAM

## 2022-05-31 PROCEDURE — 96374 THER/PROPH/DIAG INJ IV PUSH: CPT

## 2022-05-31 PROCEDURE — 6370000000 HC RX 637 (ALT 250 FOR IP): Performed by: INTERNAL MEDICINE

## 2022-05-31 PROCEDURE — 82947 ASSAY GLUCOSE BLOOD QUANT: CPT

## 2022-05-31 PROCEDURE — 85025 COMPLETE CBC W/AUTO DIFF WBC: CPT

## 2022-05-31 PROCEDURE — 96376 TX/PRO/DX INJ SAME DRUG ADON: CPT

## 2022-05-31 PROCEDURE — 96375 TX/PRO/DX INJ NEW DRUG ADDON: CPT

## 2022-05-31 PROCEDURE — 99220 PR INITIAL OBSERVATION CARE/DAY 70 MINUTES: CPT | Performed by: INTERNAL MEDICINE

## 2022-05-31 PROCEDURE — 96372 THER/PROPH/DIAG INJ SC/IM: CPT

## 2022-05-31 PROCEDURE — 99285 EMERGENCY DEPT VISIT HI MDM: CPT

## 2022-05-31 PROCEDURE — 80053 COMPREHEN METABOLIC PANEL: CPT

## 2022-05-31 PROCEDURE — 72100 X-RAY EXAM L-S SPINE 2/3 VWS: CPT

## 2022-05-31 PROCEDURE — 2580000003 HC RX 258: Performed by: INTERNAL MEDICINE

## 2022-05-31 RX ORDER — SODIUM CHLORIDE 9 MG/ML
INJECTION, SOLUTION INTRAVENOUS CONTINUOUS
Status: DISCONTINUED | OUTPATIENT
Start: 2022-05-31 | End: 2022-06-01

## 2022-05-31 RX ORDER — ALBUTEROL SULFATE 90 UG/1
2 AEROSOL, METERED RESPIRATORY (INHALATION) EVERY 4 HOURS PRN
Status: DISCONTINUED | OUTPATIENT
Start: 2022-05-31 | End: 2022-06-10 | Stop reason: HOSPADM

## 2022-05-31 RX ORDER — DEXTROSE MONOHYDRATE 50 MG/ML
100 INJECTION, SOLUTION INTRAVENOUS PRN
Status: DISCONTINUED | OUTPATIENT
Start: 2022-05-31 | End: 2022-06-01 | Stop reason: SDUPTHER

## 2022-05-31 RX ORDER — METHYLPREDNISOLONE 4 MG/1
4 TABLET ORAL NIGHTLY
Status: DISCONTINUED | OUTPATIENT
Start: 2022-06-02 | End: 2022-06-01

## 2022-05-31 RX ORDER — ACETAMINOPHEN 500 MG
500 TABLET ORAL EVERY 6 HOURS PRN
Status: DISCONTINUED | OUTPATIENT
Start: 2022-05-31 | End: 2022-06-10 | Stop reason: HOSPADM

## 2022-05-31 RX ORDER — LISINOPRIL 20 MG/1
40 TABLET ORAL DAILY
Status: DISCONTINUED | OUTPATIENT
Start: 2022-05-31 | End: 2022-06-10 | Stop reason: HOSPADM

## 2022-05-31 RX ORDER — LORAZEPAM 0.5 MG/1
0.5 TABLET ORAL NIGHTLY PRN
Status: DISCONTINUED | OUTPATIENT
Start: 2022-05-31 | End: 2022-06-07

## 2022-05-31 RX ORDER — METHYLPREDNISOLONE 4 MG/1
4 TABLET ORAL
Status: DISCONTINUED | OUTPATIENT
Start: 2022-06-01 | End: 2022-06-01

## 2022-05-31 RX ORDER — METHYLPREDNISOLONE 4 MG/1
8 TABLET ORAL NIGHTLY
Status: DISCONTINUED | OUTPATIENT
Start: 2022-06-01 | End: 2022-06-01

## 2022-05-31 RX ORDER — CYCLOBENZAPRINE HCL 10 MG
10 TABLET ORAL 3 TIMES DAILY PRN
Status: DISCONTINUED | OUTPATIENT
Start: 2022-05-31 | End: 2022-06-02

## 2022-05-31 RX ORDER — ORPHENADRINE CITRATE 30 MG/ML
60 INJECTION INTRAMUSCULAR; INTRAVENOUS ONCE
Status: COMPLETED | OUTPATIENT
Start: 2022-05-31 | End: 2022-05-31

## 2022-05-31 RX ORDER — LIDOCAINE 4 G/G
1 PATCH TOPICAL ONCE
Status: COMPLETED | OUTPATIENT
Start: 2022-05-31 | End: 2022-05-31

## 2022-05-31 RX ORDER — BUSPIRONE HYDROCHLORIDE 10 MG/1
10 TABLET ORAL 2 TIMES DAILY
Status: DISCONTINUED | OUTPATIENT
Start: 2022-05-31 | End: 2022-06-10 | Stop reason: HOSPADM

## 2022-05-31 RX ORDER — CHLORTHALIDONE 25 MG/1
25 TABLET ORAL DAILY
Status: DISCONTINUED | OUTPATIENT
Start: 2022-05-31 | End: 2022-06-10 | Stop reason: HOSPADM

## 2022-05-31 RX ORDER — KETOROLAC TROMETHAMINE 30 MG/ML
15 INJECTION, SOLUTION INTRAMUSCULAR; INTRAVENOUS ONCE
Status: COMPLETED | OUTPATIENT
Start: 2022-05-31 | End: 2022-05-31

## 2022-05-31 RX ORDER — METHYLPREDNISOLONE SODIUM SUCCINATE 125 MG/2ML
125 INJECTION, POWDER, LYOPHILIZED, FOR SOLUTION INTRAMUSCULAR; INTRAVENOUS ONCE
Status: COMPLETED | OUTPATIENT
Start: 2022-05-31 | End: 2022-05-31

## 2022-05-31 RX ORDER — MORPHINE SULFATE 4 MG/ML
4 INJECTION, SOLUTION INTRAMUSCULAR; INTRAVENOUS ONCE
Status: COMPLETED | OUTPATIENT
Start: 2022-05-31 | End: 2022-05-31

## 2022-05-31 RX ORDER — METHYLPREDNISOLONE 4 MG/1
24 TABLET ORAL ONCE
Status: COMPLETED | OUTPATIENT
Start: 2022-05-31 | End: 2022-05-31

## 2022-05-31 RX ORDER — DIPHENHYDRAMINE HCL 25 MG
50 TABLET ORAL NIGHTLY PRN
Status: DISCONTINUED | OUTPATIENT
Start: 2022-05-31 | End: 2022-05-31

## 2022-05-31 RX ORDER — PANTOPRAZOLE SODIUM 40 MG/1
40 TABLET, DELAYED RELEASE ORAL
Status: DISCONTINUED | OUTPATIENT
Start: 2022-06-01 | End: 2022-06-10 | Stop reason: HOSPADM

## 2022-05-31 RX ORDER — ACETAMINOPHEN 500 MG
1000 TABLET ORAL ONCE
Status: COMPLETED | OUTPATIENT
Start: 2022-05-31 | End: 2022-05-31

## 2022-05-31 RX ORDER — GLIPIZIDE 5 MG/1
10 TABLET ORAL
Status: DISCONTINUED | OUTPATIENT
Start: 2022-05-31 | End: 2022-06-06

## 2022-05-31 RX ORDER — CYCLOBENZAPRINE HCL 10 MG
10 TABLET ORAL ONCE
Status: COMPLETED | OUTPATIENT
Start: 2022-05-31 | End: 2022-05-31

## 2022-05-31 RX ORDER — ENOXAPARIN SODIUM 100 MG/ML
40 INJECTION SUBCUTANEOUS DAILY
Status: DISCONTINUED | OUTPATIENT
Start: 2022-05-31 | End: 2022-06-06

## 2022-05-31 RX ORDER — ROSUVASTATIN CALCIUM 20 MG/1
20 TABLET, COATED ORAL NIGHTLY
Status: DISCONTINUED | OUTPATIENT
Start: 2022-05-31 | End: 2022-06-10 | Stop reason: HOSPADM

## 2022-05-31 RX ORDER — MORPHINE SULFATE 4 MG/ML
4 INJECTION, SOLUTION INTRAMUSCULAR; INTRAVENOUS EVERY 4 HOURS PRN
Status: DISCONTINUED | OUTPATIENT
Start: 2022-05-31 | End: 2022-06-01

## 2022-05-31 RX ORDER — ONDANSETRON 2 MG/ML
4 INJECTION INTRAMUSCULAR; INTRAVENOUS ONCE
Status: COMPLETED | OUTPATIENT
Start: 2022-05-31 | End: 2022-05-31

## 2022-05-31 RX ORDER — INSULIN LISPRO 100 [IU]/ML
0-6 INJECTION, SOLUTION INTRAVENOUS; SUBCUTANEOUS
Status: DISCONTINUED | OUTPATIENT
Start: 2022-05-31 | End: 2022-06-10 | Stop reason: HOSPADM

## 2022-05-31 RX ORDER — GABAPENTIN 300 MG/1
300 CAPSULE ORAL 3 TIMES DAILY
Status: DISCONTINUED | OUTPATIENT
Start: 2022-05-31 | End: 2022-06-06

## 2022-05-31 RX ORDER — ASPIRIN 81 MG/1
81 TABLET ORAL DAILY
Status: DISCONTINUED | OUTPATIENT
Start: 2022-05-31 | End: 2022-06-10 | Stop reason: HOSPADM

## 2022-05-31 RX ORDER — INSULIN LISPRO 100 [IU]/ML
0-3 INJECTION, SOLUTION INTRAVENOUS; SUBCUTANEOUS NIGHTLY
Status: DISCONTINUED | OUTPATIENT
Start: 2022-05-31 | End: 2022-06-10 | Stop reason: HOSPADM

## 2022-05-31 RX ORDER — LATANOPROST 50 UG/ML
1 SOLUTION/ DROPS OPHTHALMIC NIGHTLY
Status: DISCONTINUED | OUTPATIENT
Start: 2022-05-31 | End: 2022-06-10 | Stop reason: HOSPADM

## 2022-05-31 RX ORDER — ROPINIROLE 2 MG/1
2 TABLET, FILM COATED ORAL NIGHTLY
Status: DISCONTINUED | OUTPATIENT
Start: 2022-05-31 | End: 2022-06-09

## 2022-05-31 RX ADMIN — GLIPIZIDE 10 MG: 5 TABLET ORAL at 16:47

## 2022-05-31 RX ADMIN — ASPIRIN 81 MG: 81 TABLET, COATED ORAL at 16:54

## 2022-05-31 RX ADMIN — GABAPENTIN 300 MG: 300 CAPSULE ORAL at 21:35

## 2022-05-31 RX ADMIN — MORPHINE SULFATE 4 MG: 4 INJECTION, SOLUTION INTRAMUSCULAR; INTRAVENOUS at 06:05

## 2022-05-31 RX ADMIN — LORAZEPAM 0.5 MG: 0.5 TABLET ORAL at 21:54

## 2022-05-31 RX ADMIN — ONDANSETRON 4 MG: 2 INJECTION INTRAMUSCULAR; INTRAVENOUS at 06:04

## 2022-05-31 RX ADMIN — LATANOPROST 1 DROP: 50 SOLUTION OPHTHALMIC at 21:55

## 2022-05-31 RX ADMIN — METHYLPREDNISOLONE SODIUM SUCCINATE 125 MG: 125 INJECTION, POWDER, FOR SOLUTION INTRAMUSCULAR; INTRAVENOUS at 06:01

## 2022-05-31 RX ADMIN — MORPHINE SULFATE 4 MG: 4 INJECTION, SOLUTION INTRAMUSCULAR; INTRAVENOUS at 21:36

## 2022-05-31 RX ADMIN — ORPHENADRINE CITRATE 60 MG: 60 INJECTION INTRAMUSCULAR; INTRAVENOUS at 06:03

## 2022-05-31 RX ADMIN — LISINOPRIL 40 MG: 20 TABLET ORAL at 16:54

## 2022-05-31 RX ADMIN — ENOXAPARIN SODIUM 40 MG: 100 INJECTION SUBCUTANEOUS at 18:48

## 2022-05-31 RX ADMIN — GABAPENTIN 300 MG: 300 CAPSULE ORAL at 16:54

## 2022-05-31 RX ADMIN — INSULIN LISPRO 2 UNITS: 100 INJECTION, SOLUTION INTRAVENOUS; SUBCUTANEOUS at 21:41

## 2022-05-31 RX ADMIN — MORPHINE SULFATE 4 MG: 4 INJECTION, SOLUTION INTRAMUSCULAR; INTRAVENOUS at 16:46

## 2022-05-31 RX ADMIN — ROSUVASTATIN CALCIUM 20 MG: 20 TABLET, FILM COATED ORAL at 21:35

## 2022-05-31 RX ADMIN — INSULIN LISPRO 3 UNITS: 100 INJECTION, SOLUTION INTRAVENOUS; SUBCUTANEOUS at 16:56

## 2022-05-31 RX ADMIN — ROPINIROLE HYDROCHLORIDE 2 MG: 2 TABLET, FILM COATED ORAL at 19:50

## 2022-05-31 RX ADMIN — BUSPIRONE HYDROCHLORIDE 10 MG: 10 TABLET ORAL at 21:35

## 2022-05-31 RX ADMIN — CYCLOBENZAPRINE 10 MG: 10 TABLET, FILM COATED ORAL at 17:24

## 2022-05-31 RX ADMIN — CYCLOBENZAPRINE 10 MG: 10 TABLET, FILM COATED ORAL at 10:34

## 2022-05-31 RX ADMIN — METHYLPREDNISOLONE 24 MG: 4 TABLET ORAL at 16:46

## 2022-05-31 RX ADMIN — CHLORTHALIDONE 25 MG: 25 TABLET ORAL at 16:55

## 2022-05-31 RX ADMIN — ACETAMINOPHEN 1000 MG: 500 TABLET ORAL at 10:34

## 2022-05-31 RX ADMIN — SODIUM CHLORIDE: 9 INJECTION, SOLUTION INTRAVENOUS at 18:06

## 2022-05-31 RX ADMIN — KETOROLAC TROMETHAMINE 15 MG: 30 INJECTION, SOLUTION INTRAMUSCULAR; INTRAVENOUS at 10:34

## 2022-05-31 ASSESSMENT — PAIN SCALES - GENERAL
PAINLEVEL_OUTOF10: 9
PAINLEVEL_OUTOF10: 10

## 2022-05-31 ASSESSMENT — ENCOUNTER SYMPTOMS
VOMITING: 0
COUGH: 0
BACK PAIN: 0
SHORTNESS OF BREATH: 0
NAUSEA: 0
RHINORRHEA: 0
ABDOMINAL PAIN: 0

## 2022-05-31 ASSESSMENT — PAIN DESCRIPTION - LOCATION
LOCATION: BACK;LEG
LOCATION: HIP;BUTTOCKS

## 2022-05-31 ASSESSMENT — PAIN DESCRIPTION - DESCRIPTORS
DESCRIPTORS: SHARP;SHOOTING
DESCRIPTORS: SHARP
DESCRIPTORS: SHARP;SHOOTING
DESCRIPTORS: SHARP;SHOOTING

## 2022-05-31 ASSESSMENT — PAIN DESCRIPTION - ORIENTATION
ORIENTATION: LEFT

## 2022-05-31 ASSESSMENT — PAIN DESCRIPTION - PAIN TYPE: TYPE: ACUTE PAIN;OTHER (COMMENT)

## 2022-05-31 NOTE — ED NOTES
Mode of arrival (squad #, walk in, police, etc) : Wheelchair        Chief complaint(s): Back pain        Arrival Note (brief scenario, treatment PTA, etc). : Pt complaining of sciatica pain that began this morning. She states she took her gabapentin around 9 the evening and 5 200mg motrin at 0300 with no relief. Pt leaning over bed refusing to get in bed. Pt rating pain 10/10. C= \"Have you ever felt that you should Cut down on your drinking? \"  No  A= \"Have people Annoyed you by criticizing your drinking? \"  No  G= \"Have you ever felt bad or Guilty about your drinking? \"  No  E= \"Have you ever had a drink as an Eye-opener first thing in the morning to steady your nerves or to help a hangover? \"  No      Deferred []      Reason for deferring: N/A    *If yes to two or more: probable alcohol abuse. Britt Oliveira [place], LEELA  05/31/22 0056

## 2022-05-31 NOTE — PROGRESS NOTES
Patient quiet at this time. Not crying. States pain has improved slightly. Did ask however if there was something besides morphine that could be given for her pain. Then she asked about Toradol. Explained to patient that she did not have toradol ordered.

## 2022-05-31 NOTE — ED PROVIDER NOTES
[facility]  Emergency Department Encounter  Emergency Medicine Physician     Pt Name: Farhad Bishop  MRN: [id number]  Simongfcarey 1960  Date of evaluation: 22  PCP:  Brannon Shelby MD    [address] [place] [address]       Chief Complaint   Patient presents with    Back Pain       HISTORY OF PRESENT ILLNESS  (Location/Symptom, Timing/Onset, Context/Setting, Quality, Duration, Modifying Factors, Severity.)    Farhad Bishop is a 65 y.o. female who presents with left buttock pain with radiation down the left lateral leg. Patient states that she has a history of sciatica had a flareup several months ago. States that otherwise she has been doing well. States that she rolled over in bed last night and then began having sudden onset pain to the area. She states this feels like radiculopathy from sciatica that she has had in the past.  Denies any trauma. Denies any heavy lifting or heavy straining. She denies any fevers or chills. She denies any nausea or vomiting. States that she took a gram of Motrin and her evening dose of gabapentin around 2 AM without any significant relief of either. PAST MEDICAL / SURGICAL / SOCIAL / FAMILY HISTORY    has a past medical history of Cervical spondylosis, Generalized anxiety disorder, Hyperlipidemia, Hypertension, Lumbar radiculopathy, DIEGO (nonalcoholic steatohepatitis), Obesity, Osteoarthritis of left knee, Restless legs syndrome, and Type II or unspecified type diabetes mellitus without mention of complication, not stated as uncontrolled. has a past surgical history that includes Upper gastrointestinal endoscopy (2012); Colonoscopy (2012);  section; Hysterectomy, total abdominal (); shoulder surgery (Left, 2021); and shoulder surgery (Left, 2021).     Social History     Socioeconomic History    Marital status:      Spouse name: Not on file    Number of children: Not on file    Years of education: Not on file    Highest education level: Not on file   Occupational History    Not on file   Tobacco Use    Smoking status: Never Smoker    Smokeless tobacco: Never Used   Vaping Use    Vaping Use: Never used   Substance and Sexual Activity    Alcohol use: Yes     Alcohol/week: 0.0 standard drinks     Comment: rarely/ 4 times a year    Drug use: No    Sexual activity: Yes   Other Topics Concern    Not on file   Social History Narrative    Not on file     Social Determinants of Health     Financial Resource Strain: Low Risk     Difficulty of Paying Living Expenses: Not very hard   Food Insecurity: No Food Insecurity    Worried About Running Out of Food in the Last Year: Never true    Valerie of Food in the Last Year: Never true   Transportation Needs:     Lack of Transportation (Medical): Not on file    Lack of Transportation (Non-Medical):  Not on file   Physical Activity:     Days of Exercise per Week: Not on file    Minutes of Exercise per Session: Not on file   Stress:     Feeling of Stress : Not on file   Social Connections:     Frequency of Communication with Friends and Family: Not on file    Frequency of Social Gatherings with Friends and Family: Not on file    Attends Congregation Services: Not on file    Active Member of [address] [place] [address] or Organizations: Not on file    Attends Club or Organization Meetings: Not on file    Marital Status: Not on file   Intimate Partner Violence:     Fear of Current or Ex-Partner: Not on file    Emotionally Abused: Not on file    Physically Abused: Not on file    Sexually Abused: Not on file   Housing Stability:     Unable to Pay for Housing in the Last Year: Not on file    Number of [place] in the Last Year: Not on file    Unstable Housing in the Last Year: Not on file       Family History   Problem Relation Age of Onset    Heart Attack Mother     Diabetes Mother     Diabetes Father     Heart Attack Father        Allergies: Codeine    Home Medications:  Prior to Admission medications    Medication Sig Start Date End Date Taking? Authorizing Provider   rosuvastatin (CRESTOR) 20 MG tablet TAKE ONE TABLET BY MOUTH NIGHTLY 5/16/22   Paula Mccormick MD   celecoxib (CELEBREX) 100 MG capsule Take 1 capsule by mouth 2 times daily 4/15/22   Abrahan Tolentino MD   ASPIRIN LOW DOSE 81 MG EC tablet TAKE 1 TABLET BY MOUTH ONE TIME A DAY 4/6/22   Rico Snow MD   gabapentin (NEURONTIN) 100 MG capsule Take one cap at 4 pm and 8 pm 4/6/22 10/6/22  Jamil Kamara MD   tiZANidine (ZANAFLEX) 2 MG tablet Take one tab at 4 pm and 8 pm as needed for muscle spasms 4/6/22   Jamil Kamara MD   gabapentin (NEURONTIN) 300 MG capsule Take 1 capsule by mouth 3 times daily for 30 days.  3/31/22 4/30/22  Timbo Conley MD   lisinopril (PRINIVIL;ZESTRIL) 40 MG tablet Take 1 tablet by mouth daily 3/18/22   Arely Garcia MD   glipiZIDE (GLUCOTROL) 10 MG tablet Take 1 tab bid 1/13/22   Abrahan Tolentino MD   busPIRone (BUSPAR) 10 MG tablet Take 1 tab three times daily as needed 1/13/22   Abrahan Tolentino MD   vitamin D (ERGOCALCIFEROL) 1.25 MG (19344 UT) CAPS capsule TAKE ONE CAPSULE BY MOUTH ONCE A WEEK (EVERY 7 DAYS) 1/11/22   MD Annalee   TRULICITY 1.5 SX/6.5AV [facility] INJECT THE CONTENTS OF ONE SYRINGE UNDER THE SKIN ONCE WEEKLY 1/3/22   Pari Hernandez MD   chlorthalidone (HYGROTON) 25 MG tablet TAKE 1 TABLET BY MOUTH ONE TIME A DAY 11/16/21   Sangeeta Gonzalez MD   SYNJARDY 5-1000 MG TABS TAKE 1 TABLET BY MOUTH 2 TIMES A DAY 11/1/21   Abrahan Tolentino MD   omeprazole (PRILOSEC) 20 MG delayed release capsule Take 1 capsule by mouth 2 times daily 7/26/21   Abrahan Tolentino MD   rOPINIRole (REQUIP) 2 MG tablet TAKE 2 TABLETS BY MOUTH NIGHTLY 7/26/21   Abrahan Tolentino MD   albuterol sulfate HFA (PROVENTIL HFA) 108 (90 Base) MCG/ACT inhaler Inhale 2 puffs into the lungs every 4 hours as needed for Wheezing 4/20/21 4/20/22  [facility] Yony Acosta MD   repaglinide (PRANDIN) 1 MG tablet TAKE 1 TABLET BY MOUTH 3 TIMES A DAY BEFORE MEALS 12/8/20   Tricia Turk MD   blood glucose test strips (PRODIGY NO CODING BLOOD GLUC) strip Use to test once daily and as needed as directed by provider 11/12/20   Jung Madrid MD   diclofenac sodium (VOLTAREN) 1 % GEL Apply 2 g topically 2 times daily  Patient taking differently: Apply 2 g topically 2 times daily Prn pain 11/12/20   Jung Madrid MD   acetaminophen (ACETAMINOPHEN EXTRA STRENGTH) 500 MG tablet Take 1,000 mg by mouth daily as needed for Pain    Historical Provider, MD   bimatoprost (LUMIGAN) 0.01 % SOLN ophthalmic drops Place 1 drop into both eyes nightly 4/26/18   Historical Provider, MD   PRODIGY LANCETS 28G MISC 1 Bottle by Does not apply route three times daily 4/26/19   Tricia Turk MD   Blood Glucose Monitoring Suppl (PRODIGY AUTOCODE BLOOD GLUCOSE) w/Device KIT 1 Device by Does not apply route 3 times daily 4/26/19   Tricia Turk MD       REVIEW OF SYSTEMS    (2-9 systems for level 4, 10 or more for level 5)    Review of Systems   Constitutional: Negative for chills, fatigue and fever. HENT: Negative for congestion and rhinorrhea. Respiratory: Negative for cough and shortness of breath. Cardiovascular: Negative for chest pain. Gastrointestinal: Negative for abdominal pain, nausea and vomiting. Genitourinary: Negative for dysuria and flank pain. Musculoskeletal: Negative for back pain and myalgias. Left buttock and left lateral leg pain   Neurological: Negative for headaches. All other systems reviewed and are negative. PHYSICAL EXAM   (up to 7 for level 4, 8 or more for level 5)    INITIAL VITALS:   ED Triage Vitals [05/31/22 0522]   BP Temp Temp Source Heart Rate Resp SpO2 Height Weight   (!) 147/79 97.7 °F (36.5 °C) Oral (!) 105 22 95 % -- --       Physical Exam  Vitals and nursing note reviewed.    Constitutional:       General: She is not in acute distress. Appearance: She is well-developed. She is not ill-appearing. Cardiovascular:      Rate and Rhythm: Normal rate and regular rhythm. Pulses:           Radial pulses are 2+ on the right side and 2+ on the left side. Heart sounds: Normal heart sounds. No murmur heard. Pulmonary:      Effort: Pulmonary effort is normal. No respiratory distress. Breath sounds: Normal breath sounds. No decreased breath sounds. Abdominal:      General: There is no distension. Palpations: Abdomen is soft. Tenderness: There is no abdominal tenderness. Musculoskeletal:         General: Tenderness present. Legs:       Comments: Tenderness on palpation to the indicated area. Skin:     General: Skin is warm and dry. Capillary Refill: Capillary refill takes less than 2 seconds. Neurological:      General: No focal deficit present. Mental Status: She is alert and oriented to person, place, and time. DIFFERENTIAL  DIAGNOSIS   PLAN (LABS / IMAGING / EKG):  No orders of the defined types were placed in this encounter. MEDICATIONS ORDERED:  Orders Placed This Encounter   Medications    morphine sulfate (PF) injection 4 mg    orphenadrine (NORFLEX) injection 60 mg    methylPREDNISolone sodium (SOLU-MEDROL) injection 125 mg    ondansetron (ZOFRAN) injection 4 mg    lidocaine 4 % external patch 1 patch         DIAGNOSTIC RESULTS / EMERGENCYDEPARTMENT COURSE / MDM   LABS:  Labs Reviewed - No data to display    RADIOLOGY:  No results found. Impression:  Patient with longstanding history of sciatica. Appears to be having a flare. No midline lumbar tenderness. Patient bent over on the bed which she states is most comfortable position for her. She is unable to sit down, or lay down secondary to the pain. Tenderness on palpation to the left lateral buttock with radiation on the posterior aspect of the left leg to just above the knee. No trauma.   There is no erythema to the area, no cellulitis or other infection present. Already took gabapentin and Motrin. Will utilize morphine, Norflex, and Solu-Medrol. EMERGENCY DEPARTMENT COURSE:   Patient reassessed. She states that the pain level has decreased from 11 out of 10 down to a 9 out of 10. She is able to sit down but she is only able to lean forward at this time. She states that she is not able to lean back or relax. Will give the medication a little more time to work. I did explain the patient that if necessary we could always admit for intractable pain. Patient signed out to Dr. Radha Mccallum      CONSULTS:  None    CRITICAL CARE:  There was a high probability of clinically significant/life threatening deterioration in this patient's condition which required my urgent intervention. Total critical care time was 7 minutes. This excludes any time for separately reportable procedures. FINAL IMPRESSION     1. Sciatica of left side          DISPOSITION / PLAN   DISPOSITION  - to be determined    PATIENT REFERRED TO:  No follow-up provider specified.     DISCHARGE MEDICATIONS:  New Prescriptions    No medications on file       Bahman Babb DO  Emergency Medicine Attending    (Please note that portions of this note were completed with a voice recognition program.  Efforts were made to edit the dictations but occasionally words are mis-transcribed.)         Bahman Babb DO  05/31/22 9556

## 2022-05-31 NOTE — ED PROVIDER NOTES
ADDENDUM:        Care of this patient was assumed from Dr. Oscar Paulson    at  0700    . The patient was seen for Back Pain  . The patient's initial evaluation and plan have been discussed with the prior provider who initially evaluated the patient. Nursing Notes, Past Medical Hx, Past Surgical Hx, Social Hx, Allergies, and Family Hx were all reviewed. I performed a repeat evaluation of the patient and reviewed tests completed so far. 50-year-old female history of sciatica presenting for evaluation with acute on chronic lower back pain radiating around her left leg. Signed out to me pending reassessment after medication. Patient with persistent pain after multiple doses of IV and IM medicine. Discussed discharge home versus admission to the hospital for pain control patient prefers to be admitted. Discussed with internal medicine we will admit. ED Course        The patient was given the following medications:  Orders Placed This Encounter   Medications    morphine sulfate (PF) injection 4 mg    orphenadrine (NORFLEX) injection 60 mg    methylPREDNISolone sodium (SOLU-MEDROL) injection 125 mg    ondansetron (ZOFRAN) injection 4 mg    lidocaine 4 % external patch 1 patch    acetaminophen (TYLENOL) tablet 1,000 mg    ketorolac (TORADOL) injection 15 mg    cyclobenzaprine (FLEXERIL) tablet 10 mg       RECENT VITALS:  BP: (!) 147/79, Temp: 97.7 °F (36.5 °C), Heart Rate: (!) 105, Resp: 22     RADIOLOGY:All plain film, CT, MRI, and formal ultrasound images (except ED bedside ultrasound) are read by the radiologist and the images and interpretations are directly viewed by the emergency physician. No orders to display         LABS: All lab results were reviewed by myself, and all abnormals are listed below. Labs Reviewed - No data to display        Disposition   DISPOSITION:    DISPOSITION Admitted 05/31/2022 01:41:17 PM      CLINICAL IMPRESSION:  1. Sciatica of left side    2.  Low back pain with sciatica, sciatica laterality unspecified, unspecified back pain laterality, unspecified chronicity        PATIENT REFERRED TO:  No follow-up provider specified. DISCHARGE MEDICATIONS:  New Prescriptions    No medications on file     The care is provided during an unprecedented national emergency due to the novel coronavirus, COVID 19.   Mo Reed MD  Attending Emergency Physician              Mo Reed MD  05/31/22 3223

## 2022-05-31 NOTE — PROGRESS NOTES
Patient continues to cry states morphine did not help her pain. States she can not lay down in the bed (she attempted for approximately 20 minutes but got up again), is sitting in chair lying across bed. Patient was given flexeril upon request.    Patient asked if she had anything for sleep tonight and informed her that she did not. Patient started to cry harder.

## 2022-05-31 NOTE — PROGRESS NOTES
Medication History completed:    New medications: none    Medications discontinued: none    Changes to dosing:   Ergocalciferol is taken on Wednesdays  Trulicity is taken on Saturdays    Stated allergies: As listed    Other pertinent information: Medications confirmed with patient.      Thank you,  Neil Morfin, PharmD, BCPS  [phone]

## 2022-05-31 NOTE — ED NOTES
Assisted patient into bed. Patient did most of the movement per self. Patient is requesting to be admitted. Dr. Radames Villar to be notified.       Katty Alston RN  05/31/22 1045

## 2022-05-31 NOTE — PROGRESS NOTES
Patient crying. Finally is lying on bed. Was sitting in wheelchair with upper body bent over bed in pain from the time she arrived on the floor. No

## 2022-05-31 NOTE — H&P
[place] [address] Internal Medicine    CONSULTATION / HISTORY AND PHYSICAL EXAMINATION            Date:   2022  Patient name:  Karolina Thakkar  Date of admission:  2022  5:16 AM  MRN:   [id number]  Account:  [de-identified]  YOB: 1960  PCP:    Charlene Celeste MD  Room:     Code Status:    Full Code    Physician Requesting Consult: Primitivo Arreola MD    Reason for Consult:  medical management    Chief Complaint:     Chief Complaint   Patient presents with    Back Pain       History Obtained From:     Patient medical record nursing staff    History of Present Illness:   Patient has past medical history multiple medical problems, morbid obesity, diabetes, history of TIA, CKD, chronic back pain. Patient follows with pain management, orthopedic surgeon. She has history of epidural shots in her back  Admitted with complaints of back pain which started today morning, yesterday she was bending frequently, she was collecting some old close to donate, and likely that has precipitated her back pain.   Back pain is radiating to left buttock and left leg, no difficulty in passing urine, stools  No injury, trauma, fall  In emergency room, patient was given multiple pain shots, with minimal benefit, patient is actively crying in the pain, only comfortable position is leaning on the bed    Past Medical History:     Past Medical History:   Diagnosis Date    Cervical spondylosis 2009    c-4 c-5, c-5 c-6, c-6 c-7    Generalized anxiety disorder 2020    Hyperlipidemia     Hypertension     Lumbar radiculopathy     DIEGO (nonalcoholic steatohepatitis) 10/12/2014    Obesity     Osteoarthritis of left knee 2014    Restless legs syndrome     Type II or unspecified type diabetes mellitus without mention of complication, not stated as uncontrolled         Past Surgical History:     Past Surgical History:   Procedure Laterality Date     SECTION  COLONOSCOPY  09/17/2012    [facility]    HYSTERECTOMY, TOTAL ABDOMINAL  2008    Polymenorrhagia    SHOULDER SURGERY Left 02/17/2021     SHOULDER MANIPULATION WITH PRE OP INTERSCALENE BLOCK     SHOULDER SURGERY Left 2/17/2021    SHOULDER MANIPULATION WITH PRE OP INTERSCALENE BLOCK and intraop injection performed by Stacy Kaur DO at [address] [place] [address] [address]  [address]    [facility]        Medications Prior to Admission:     Prior to Admission medications    Medication Sig Start Date End Date Taking? Authorizing Provider   rosuvastatin (CRESTOR) 20 MG tablet TAKE ONE TABLET BY MOUTH NIGHTLY 5/16/22   Abbie Aranda MD   celecoxib (CELEBREX) 100 MG capsule Take 1 capsule by mouth 2 times daily 4/15/22   Wing Uriel MD   ASPIRIN LOW DOSE 81 MG EC tablet TAKE 1 TABLET BY MOUTH ONE TIME A DAY 4/6/22   Reta Vincent MD   gabapentin (NEURONTIN) 100 MG capsule Take one cap at 4 pm and 8 pm 4/6/22 10/6/22  Araseli Cheng MD   tiZANidine (ZANAFLEX) 2 MG tablet Take one tab at 4 pm and 8 pm as needed for muscle spasms 4/6/22   Araseli Cheng MD   gabapentin (NEURONTIN) 300 MG capsule Take 1 capsule by mouth 3 times daily for 30 days.  3/31/22 4/30/22  Opal Granger MD   lisinopril (PRINIVIL;ZESTRIL) 40 MG tablet Take 1 tablet by mouth daily 3/18/22   Franc Almanza MD   glipiZIDE (GLUCOTROL) 10 MG tablet Take 1 tab bid 1/13/22   Wing Uriel MD   busPIRone (BUSPAR) 10 MG tablet Take 1 tab three times daily as needed 1/13/22   Wing Uriel MD   vitamin D (ERGOCALCIFEROL) 1.25 MG (22457 UT) CAPS capsule TAKE ONE CAPSULE BY MOUTH ONCE A WEEK (EVERY 7 DAYS) 1/11/22   Aron Friedman MD   TRULICITY 1.5 WZ/2.7AE [facility] INJECT THE CONTENTS OF ONE SYRINGE UNDER THE SKIN ONCE WEEKLY 1/3/22   Li Camacho MD   chlorthalidone (HYGROTON) 25 MG tablet TAKE 1 TABLET BY MOUTH ONE TIME A DAY 11/16/21   Sangeeta Gonzalez MD   SYNJARDY 5-1000 MG TABS TAKE 1 TABLET BY MOUTH 2 TIMES A DAY 11/1/21   Maciej Cuenca MD   omeprazole (PRILOSEC) 20 MG delayed release capsule Take 1 capsule by mouth 2 times daily 7/26/21   Maciej Cuenca MD   rOPINIRole (REQUIP) 2 MG tablet TAKE 2 TABLETS BY MOUTH NIGHTLY 7/26/21   Maciej Cuenca MD   albuterol sulfate HFA (PROVENTIL HFA) 108 (90 Base) MCG/ACT inhaler Inhale 2 puffs into the lungs every 4 hours as needed for Wheezing 4/20/21 4/20/22  Maciej Cuenca MD   repaglinide (PRANDIN) 1 MG tablet TAKE 1 TABLET BY MOUTH 3 TIMES A DAY BEFORE MEALS 12/8/20   Maciej Cuenca MD   blood glucose test strips (PRODIGY NO CODING BLOOD GLUC) strip Use to test once daily and as needed as directed by provider 11/12/20   Luke San MD   diclofenac sodium (VOLTAREN) 1 % GEL Apply 2 g topically 2 times daily  Patient taking differently: Apply 2 g topically 2 times daily Prn pain 11/12/20   Luke San MD   acetaminophen (ACETAMINOPHEN EXTRA STRENGTH) 500 MG tablet Take 1,000 mg by mouth daily as needed for Pain    Historical Provider, MD   bimatoprost (LUMIGAN) 0.01 % SOLN ophthalmic drops Place 1 drop into both eyes nightly 4/26/18   Historical Provider, MD   PRODIGY LANCETS 28G MISC 1 Bottle by Does not apply route three times daily 4/26/19   Maciej Cuenca MD   Blood Glucose Monitoring Suppl (PRODIGY AUTOCODE BLOOD GLUCOSE) w/Device KIT 1 Device by Does not apply route 3 times daily 4/26/19   Maciej Cuenca MD        Allergies:     Codeine    Social History:     Tobacco:    reports that she has never smoked. She has never used smokeless tobacco.  Alcohol:      reports current alcohol use. Drug Use:  reports no history of drug use. Family History:     Family History   Problem Relation Age of Onset    Heart Attack Mother     Diabetes Mother     Diabetes Father     Heart Attack Father        Review of Systems:     Positive and Negative as described in HPI.     CONSTITUTIONAL:  negative for fevers, chills, sweats, fatigue, weight loss  HEENT:  negative for vision, hearing changes, runny nose, throat pain  RESPIRATORY:  negative for shortness of breath, cough, congestion, wheezing. CARDIOVASCULAR:  negative for chest pain, palpitations. GASTROINTESTINAL:  negative for nausea, vomiting, diarrhea, constipation, change in bowel habits, abdominal pain   GENITOURINARY:  negative for difficulty of urination, burning with urination, frequency   INTEGUMENT:  negative for rash, skin lesions, easy bruising   HEMATOLOGIC/LYMPHATIC:  negative for swelling/edema   ALLERGIC/IMMUNOLOGIC:  negative for urticaria , itching  ENDOCRINE:  negative increase in drinking, increase in urination, hot or cold intolerance  MUSCULOSKELETAL: Lower back pain  NEUROLOGICAL:  negative for headaches, dizziness, lightheadedness, numbness, pain, tingling extremities  BEHAVIOR/PSYCH:      Physical Exam:     BP (!) 147/87   Pulse (!) 103   Temp 97.7 °F (36.5 °C) (Oral)   Resp 20   SpO2 91%   Temp (24hrs), Av °F (36.7 °C), Min:97.7 °F (36.5 °C), Max:98.6 °F (37 °C)    No results for input(s): POCGLU in the last 72 hours. No intake or output data in the 24 hours ending 22 1607    General Appearance:  alert, well appearing, and in no acute distress, central obesity present  Mental status: oriented to person, place, and time with normal affect  Head:  normocephalic, atraumatic. Eye: no icterus, redness, pupils equal and reactive, extraocular eye movements intact, conjunctiva clear  Ear: normal external ear, no discharge, hearing intact  Nose:  no drainage noted  Mouth: mucous membranes moist  Neck: supple, no carotid bruits, thyroid not palpable  Lungs: Bilateral equal air entry, clear to ausculation, no wheezing, rales or rhonchi, normal effort  Cardiovascular: normal rate, regular rhythm, no murmur, gallop, rub.   Abdomen: Soft, nontender, nondistended, normal bowel sounds, no hepatomegaly or splenomegaly  Neurologic: Power in left leg is 5/5, patient is not moving left leg because of pain  Skin: No gross lesions, rashes, bruising or bleeding on exposed skin area  Extremities: Tenderness, and left buttock area  Psych: Investigations:      Laboratory Testing:  No results found for this or any previous visit (from the past 24 hour(s)). Consultations:   IP CONSULT TO ORTHOPEDIC SURGERY  Assessment :      Primary Problem  Back pain    Active Hospital Problems    Diagnosis Date Noted    Back pain [M54.9] 05/31/2022     Priority: Medium   Principal Problem:    Back pain  Active Problems:    Hypertension    DIEGO (nonalcoholic steatohepatitis)    Dyslipidemia    Diabetes mellitus type 2, uncontrolled ([facility] [address]  Resolved Problems:    * No resolved hospital problems. *        Plan:     Lower back pain with left-sided sciatica, patient had lumbar spine MRI back in April of this year, suggestive of DJD, there is disc problem at the level of L5  Starting patient on Medrol Dosepak, Flexeril, morphine for pain control  Diabetes, restarting home medication, sliding scale  Orthopedic surgery consult  Lovenox for DVT prophylaxis          Primitivo Arreola MD  5/31/2022  4:07 PM    Copy sent to Dr. Charlene Celeste MD    Please note that this chart was generated using voice recognition Dragon dictation software. Although every effort was made to ensure the accuracy of this automated transcription, some errors in transcription may have occurred.

## 2022-05-31 NOTE — ED NOTES
Report given to LEELA Shipley from [place]. Report method by phone   The following was reviewed with receiving RN:   Current vital signs:  /82   Pulse 99   Temp 98.6 °F (37 °C) (Oral)   Resp 18   SpO2 96%                MEWS Score: 1     Any medication or safety alerts were reviewed. Any pending diagnostics and notifications were also reviewed, as well as any safety concerns or issues, abnormal labs, abnormal imaging, and abnormal assessment findings. Questions were answered.             Shi Pruitt RN  05/31/22 5915

## 2022-06-01 ENCOUNTER — APPOINTMENT (OUTPATIENT)
Dept: MRI IMAGING | Age: 62
DRG: 551 | End: 2022-06-01
Payer: COMMERCIAL

## 2022-06-01 LAB
GLUCOSE BLD-MCNC: 193 MG/DL (ref 65–105)
GLUCOSE BLD-MCNC: 198 MG/DL (ref 65–105)
GLUCOSE BLD-MCNC: 209 MG/DL (ref 65–105)
GLUCOSE BLD-MCNC: 227 MG/DL (ref 65–105)

## 2022-06-01 PROCEDURE — 96375 TX/PRO/DX INJ NEW DRUG ADDON: CPT

## 2022-06-01 PROCEDURE — 82947 ASSAY GLUCOSE BLOOD QUANT: CPT

## 2022-06-01 PROCEDURE — 96372 THER/PROPH/DIAG INJ SC/IM: CPT

## 2022-06-01 PROCEDURE — 6360000002 HC RX W HCPCS: Performed by: INTERNAL MEDICINE

## 2022-06-01 PROCEDURE — 99225 PR SBSQ OBSERVATION CARE/DAY 25 MINUTES: CPT | Performed by: INTERNAL MEDICINE

## 2022-06-01 PROCEDURE — 2580000003 HC RX 258: Performed by: INTERNAL MEDICINE

## 2022-06-01 PROCEDURE — 6360000002 HC RX W HCPCS: Performed by: ORTHOPAEDIC SURGERY

## 2022-06-01 PROCEDURE — 96376 TX/PRO/DX INJ SAME DRUG ADON: CPT

## 2022-06-01 PROCEDURE — 6370000000 HC RX 637 (ALT 250 FOR IP): Performed by: ORTHOPAEDIC SURGERY

## 2022-06-01 PROCEDURE — G0378 HOSPITAL OBSERVATION PER HR: HCPCS

## 2022-06-01 PROCEDURE — 6370000000 HC RX 637 (ALT 250 FOR IP): Performed by: INTERNAL MEDICINE

## 2022-06-01 RX ORDER — DEXTROSE MONOHYDRATE 50 MG/ML
100 INJECTION, SOLUTION INTRAVENOUS PRN
Status: DISCONTINUED | OUTPATIENT
Start: 2022-06-01 | End: 2022-06-10 | Stop reason: HOSPADM

## 2022-06-01 RX ORDER — DEXAMETHASONE SODIUM PHOSPHATE 10 MG/ML
10 INJECTION INTRAMUSCULAR; INTRAVENOUS EVERY 6 HOURS
Status: COMPLETED | OUTPATIENT
Start: 2022-06-01 | End: 2022-06-02

## 2022-06-01 RX ORDER — DIAZEPAM 5 MG/1
10 TABLET ORAL
Status: COMPLETED | OUTPATIENT
Start: 2022-06-01 | End: 2022-06-01

## 2022-06-01 RX ORDER — OXYCODONE HYDROCHLORIDE AND ACETAMINOPHEN 5; 325 MG/1; MG/1
1 TABLET ORAL EVERY 4 HOURS PRN
Status: DISCONTINUED | OUTPATIENT
Start: 2022-06-01 | End: 2022-06-02

## 2022-06-01 RX ADMIN — INSULIN LISPRO 1 UNITS: 100 INJECTION, SOLUTION INTRAVENOUS; SUBCUTANEOUS at 21:28

## 2022-06-01 RX ADMIN — CYCLOBENZAPRINE 10 MG: 10 TABLET, FILM COATED ORAL at 00:21

## 2022-06-01 RX ADMIN — INSULIN LISPRO 2 UNITS: 100 INJECTION, SOLUTION INTRAVENOUS; SUBCUTANEOUS at 17:05

## 2022-06-01 RX ADMIN — ACETAMINOPHEN 500 MG: 500 TABLET ORAL at 06:13

## 2022-06-01 RX ADMIN — CHLORTHALIDONE 25 MG: 25 TABLET ORAL at 10:23

## 2022-06-01 RX ADMIN — SODIUM CHLORIDE: 9 INJECTION, SOLUTION INTRAVENOUS at 06:05

## 2022-06-01 RX ADMIN — METHYLPREDNISOLONE 4 MG: 4 TABLET ORAL at 06:07

## 2022-06-01 RX ADMIN — GABAPENTIN 300 MG: 300 CAPSULE ORAL at 21:28

## 2022-06-01 RX ADMIN — MORPHINE SULFATE 4 MG: 4 INJECTION, SOLUTION INTRAMUSCULAR; INTRAVENOUS at 07:31

## 2022-06-01 RX ADMIN — METHYLPREDNISOLONE 4 MG: 4 TABLET ORAL at 14:26

## 2022-06-01 RX ADMIN — ROPINIROLE HYDROCHLORIDE 2 MG: 2 TABLET, FILM COATED ORAL at 19:46

## 2022-06-01 RX ADMIN — GLIPIZIDE 10 MG: 5 TABLET ORAL at 06:08

## 2022-06-01 RX ADMIN — DIAZEPAM 10 MG: 5 TABLET ORAL at 17:27

## 2022-06-01 RX ADMIN — BUSPIRONE HYDROCHLORIDE 10 MG: 10 TABLET ORAL at 07:31

## 2022-06-01 RX ADMIN — CYCLOBENZAPRINE 10 MG: 10 TABLET, FILM COATED ORAL at 14:26

## 2022-06-01 RX ADMIN — GABAPENTIN 300 MG: 300 CAPSULE ORAL at 07:31

## 2022-06-01 RX ADMIN — GLIPIZIDE 10 MG: 5 TABLET ORAL at 19:46

## 2022-06-01 RX ADMIN — GABAPENTIN 300 MG: 300 CAPSULE ORAL at 14:26

## 2022-06-01 RX ADMIN — ASPIRIN 81 MG: 81 TABLET, COATED ORAL at 07:31

## 2022-06-01 RX ADMIN — MORPHINE SULFATE 4 MG: 4 INJECTION, SOLUTION INTRAMUSCULAR; INTRAVENOUS at 03:01

## 2022-06-01 RX ADMIN — OXYCODONE HYDROCHLORIDE AND ACETAMINOPHEN 1 TABLET: 5; 325 TABLET ORAL at 12:28

## 2022-06-01 RX ADMIN — ENOXAPARIN SODIUM 40 MG: 100 INJECTION SUBCUTANEOUS at 10:23

## 2022-06-01 RX ADMIN — LATANOPROST 1 DROP: 50 SOLUTION OPHTHALMIC at 21:29

## 2022-06-01 RX ADMIN — ROSUVASTATIN CALCIUM 20 MG: 20 TABLET, FILM COATED ORAL at 21:28

## 2022-06-01 RX ADMIN — BUSPIRONE HYDROCHLORIDE 10 MG: 10 TABLET ORAL at 21:28

## 2022-06-01 RX ADMIN — CYCLOBENZAPRINE 10 MG: 10 TABLET, FILM COATED ORAL at 07:37

## 2022-06-01 RX ADMIN — INSULIN LISPRO 1 UNITS: 100 INJECTION, SOLUTION INTRAVENOUS; SUBCUTANEOUS at 07:28

## 2022-06-01 RX ADMIN — PANTOPRAZOLE SODIUM 40 MG: 40 TABLET, DELAYED RELEASE ORAL at 06:08

## 2022-06-01 RX ADMIN — OXYCODONE HYDROCHLORIDE AND ACETAMINOPHEN 1 TABLET: 5; 325 TABLET ORAL at 17:05

## 2022-06-01 RX ADMIN — DEXAMETHASONE SODIUM PHOSPHATE 10 MG: 10 INJECTION INTRAMUSCULAR; INTRAVENOUS at 19:46

## 2022-06-01 RX ADMIN — LISINOPRIL 40 MG: 20 TABLET ORAL at 07:31

## 2022-06-01 RX ADMIN — INSULIN LISPRO 2 UNITS: 100 INJECTION, SOLUTION INTRAVENOUS; SUBCUTANEOUS at 11:40

## 2022-06-01 ASSESSMENT — PAIN DESCRIPTION - ORIENTATION
ORIENTATION: LEFT;LOWER
ORIENTATION: RIGHT;LEFT
ORIENTATION: LEFT;LOWER

## 2022-06-01 ASSESSMENT — PAIN SCALES - GENERAL
PAINLEVEL_OUTOF10: 8
PAINLEVEL_OUTOF10: 9
PAINLEVEL_OUTOF10: 10
PAINLEVEL_OUTOF10: 9
PAINLEVEL_OUTOF10: 10
PAINLEVEL_OUTOF10: 8
PAINLEVEL_OUTOF10: 10
PAINLEVEL_OUTOF10: 10
PAINLEVEL_OUTOF10: 9
PAINLEVEL_OUTOF10: 10

## 2022-06-01 ASSESSMENT — PAIN DESCRIPTION - DIRECTION
RADIATING_TOWARDS: LEFT LEG

## 2022-06-01 ASSESSMENT — PAIN DESCRIPTION - PAIN TYPE
TYPE: ACUTE PAIN

## 2022-06-01 ASSESSMENT — PAIN - FUNCTIONAL ASSESSMENT
PAIN_FUNCTIONAL_ASSESSMENT: PREVENTS OR INTERFERES WITH MANY ACTIVE NOT PASSIVE ACTIVITIES
PAIN_FUNCTIONAL_ASSESSMENT: PREVENTS OR INTERFERES WITH ALL ACTIVE AND SOME PASSIVE ACTIVITIES
PAIN_FUNCTIONAL_ASSESSMENT: PREVENTS OR INTERFERES WITH MANY ACTIVE NOT PASSIVE ACTIVITIES
PAIN_FUNCTIONAL_ASSESSMENT: PREVENTS OR INTERFERES WITH MANY ACTIVE NOT PASSIVE ACTIVITIES
PAIN_FUNCTIONAL_ASSESSMENT: PREVENTS OR INTERFERES WITH ALL ACTIVE AND SOME PASSIVE ACTIVITIES
PAIN_FUNCTIONAL_ASSESSMENT: PREVENTS OR INTERFERES WITH MANY ACTIVE NOT PASSIVE ACTIVITIES

## 2022-06-01 ASSESSMENT — PAIN DESCRIPTION - ONSET
ONSET: ON-GOING

## 2022-06-01 ASSESSMENT — PAIN DESCRIPTION - DESCRIPTORS
DESCRIPTORS: ACHING;SHARP;SPASM
DESCRIPTORS: SPASM;SHARP;ACHING
DESCRIPTORS: SPASM;SHARP
DESCRIPTORS: NAGGING;SHARP
DESCRIPTORS: STABBING
DESCRIPTORS: SPASM

## 2022-06-01 ASSESSMENT — PAIN DESCRIPTION - LOCATION
LOCATION: BACK
LOCATION: BACK
LOCATION: BACK;BUTTOCKS
LOCATION: BACK
LOCATION: HIP;BUTTOCKS;LEG

## 2022-06-01 ASSESSMENT — PAIN DESCRIPTION - FREQUENCY
FREQUENCY: CONTINUOUS

## 2022-06-01 NOTE — CARE COORDINATION
CASE MANAGEMENT NOTE:    Admission Date:  5/31/2022 Stefan Khalil is a 65 y.o.  female    Admitted for : Back pain [M54.9]  Sciatica of left side [M54.32]  Low back pain with sciatica, sciatica laterality unspecified, unspecified back pain laterality, unspecified chronicity [M54.40]    Met with:  Patient    PCP:  Dr. Angie Mccloud:  Abdoul Villareal      Is patient alert and oriented at time of discussion:  Yes    Current Residence/ Living Arrangements:  independently at home with her spouse and daughters             Current Services PTA:  Pt has been going to OP PT at [facility]. States she will need a new order as she is near the end of her visits. Does patient go to outpatient dialysis: No  If yes, location and chair time: n/a    Is patient agreeable to VNS: No    Freedom of choice provided:  Yes    List of [address] provided: No    VNS chosen:  NA    DME:  straight cane    Home Oxygen: No    Nebulizer: No    CPAP/BIPAP: No    Supplier: N/A    Potential Assistance Needed: No    SNF needed: No    Freedom of choice and list provided: NA    Pharmacy:  [id number] at [facility]       Is patient currently receiving oral anticoagulation therapy? No    Is the Patient an [facility] with Readmission Risk Score greater than 14%? No  If yes, pt needs a follow up appointment made within 7 days. Family Members/Caregivers that pt would like involved in their care:    Yes    If yes, list name here:  Benjamín Suggs and Matt Srivastava    Transportation Provider:  Patient and Family             Discharge Plan:  Home with OP PT.                  Electronically signed by: Dale Avina RN on 6/1/2022 at 12:35 PM

## 2022-06-01 NOTE — PROGRESS NOTES
Physical Therapy        Physical Therapy Cancel Note      DATE: 2022    NAME: Mel Kaur  MRN: [id number]   : 1960      Patient not seen this date for Physical Therapy due to: Other: Pt in severe pain, difficult to get into comfortable position, Dr Laurel Cortez has been consulted, will await forhis assessmemnt to be completed prior to therapy assessment.        Electronically signed by Tricia Burroughs PT on 2022 at 12:07 PM

## 2022-06-01 NOTE — CONSULTS
Consults  Patient: Toñito Cheney  Unit/Bed: 7171/8203-35  YOB: 1960  MRN: [id number]  Acct: [de-identified]   Admitting Diagnosis: Back pain [M54.9]  Sciatica of left side [M54.32]  Low back pain with sciatica, sciatica laterality unspecified, unspecified back pain laterality, unspecified chronicity [M54.40]  Admit Date:  5/31/2022  Hospital Day: 0    Subjective:    Patient is having problems with Left buttock pain  HPI  Patient Seen, Chart, Labs, Radiologystudies, and Consults reviewed. Patient seen 3 mo ago with significant S1 left radicular pain    MRI negative    Referred to pain mgmt    Pt working with DC    Reports was doing a lot better with resolution of numbness and tingling to her left foot    Reports did some more activity this week but by description minimal     Now with Left buttock pain        Objective:   /79   Pulse 86   Temp 98.2 °F (36.8 °C) (Oral)   Resp 20   SpO2 95%   Intake/Output Summary (Last 24 hours) at 6/1/2022 1706  Last data filed at 6/1/2022 1447  Gross per 24 hour   Intake 1851.6 ml   Output 500 ml   Net 1351.6 ml     Review of Systems  Physical Exam  Vitals and nursing note reviewed. Constitutional:       Appearance: She is well-developed. HENT:      Head: Normocephalic and atraumatic. Nose: Nose normal.   Eyes:      Conjunctiva/sclera: Conjunctivae normal.   Pulmonary:      Effort: Pulmonary effort is normal. No respiratory distress. Musculoskeletal:      Cervical back: Normal range of motion and neck supple. Comments: Normal gait     Skin:     General: Skin is warm and dry. Neurological:      Mental Status: She is alert and oriented to person, place, and time. Sensory: No sensory deficit. Psychiatric:         Behavior: Behavior normal.         Thought Content:  Thought content normal.     left hip no pain with rom    Gait normal slow    Positive left straight leg raise      Drains:No  Imaging:Yes MRI earlier this year reviewed do no correlate to patients pain      Medications:    dexamethasone  10 mg IntraVENous Q6H    aspirin  81 mg Oral Daily    latanoprost  1 drop Both Eyes Nightly    busPIRone  10 mg Oral BID    chlorthalidone  25 mg Oral Daily    gabapentin  300 mg Oral TID    glipiZIDE  10 mg Oral BID AC    pantoprazole  40 mg Oral QAM AC    lisinopril  40 mg Oral Daily    rOPINIRole  2 mg Oral Nightly    rosuvastatin  20 mg Oral Nightly    insulin lispro  0-6 Units SubCUTAneous TID WC    insulin lispro  0-3 Units SubCUTAneous Nightly    enoxaparin  40 mg SubCUTAneous Daily     PRN Meds:oxyCODONE-acetaminophen, glucose, dextrose bolus **OR** dextrose bolus, glucagon (rDNA), dextrose, diazePAM, acetaminophen, cyclobenzaprine, albuterol sulfate HFA, LORazepam      Data:  CBC:   Recent Labs     05/31/22  1654   WBC 10.1   RBC 4.47   HGB 12.6   HCT 39.5   MCV 88.5   RDW 14.0        BNP: No results for input(s): BNP in the last 72 hours. PT/INR: No results for input(s): PROTIME, INR in the last 72 hours. Assessment/Plan:  Principal Problem:    Back pain  Active Problems:    Hypertension    DIEGO (nonalcoholic steatohepatitis)    Dyslipidemia    Diabetes mellitus type 2, uncontrolled ([address]  Resolved Problems:    * No resolved hospital problems.  *    PT  Decadron  MRI    Electronically signed by Arlette Burkitt, MD on 6/1/2022 at 5:06 PM    [address] [place] [address]

## 2022-06-01 NOTE — PROGRESS NOTES
[facility]    OCCUPATIONAL THERAPY MISSED TREATMENT NOTE   INPATIENT   Date: 22  Patient Name: Morena Vigil       Room:   MRN: [id number]   Account #: [de-identified]    : 1960  (63 y.o.)  Gender: female                 REASON FOR MISSED TREATMENT:  Pt in severe pain, difficult to get into comfortable position, Dr Kaz Kimbrough has been consulted, will await for his assessmemnt to be completed prior to therapy assessment.    Drake Bauman, OT

## 2022-06-01 NOTE — PLAN OF CARE
Attempted to scan patient today around 630 pm. Patient was willing to try and lay on MRI table. She stated she was in a lot of pain and was just given pain meds 30 minutes ago. She tried laying flat and started breathing heavily and crying. I attempted to stack more support under her legs to try and relieve the pressure from her lumbar. The patient could not lay still enough and was in too much pain to complete the exam. I told patient we would attempt to scan her again tomorrow. Any questions please call [phone].

## 2022-06-01 NOTE — PROGRESS NOTES
[facility]    CONSULTATION / HISTORY AND PHYSICAL EXAMINATION            Date:   6/1/2022  Patient name:  Kaylene Baker  Date of admission:  5/31/2022  5:16 AM  MRN:   [id number]  Account:  [de-identified]  YOB: 1960  PCP:    Ryan Cardozo MD  Room:   2064/2064-01  Code Status:    Full Code    Physician Requesting Consult: Cameron Garcia MD    Reason for Consult:  medical management    Chief Complaint:     Chief Complaint   Patient presents with    Back Pain       History Obtained From:     Patient medical record nursing staff    History of Present Illness:   Patient has past medical history multiple medical problems, morbid obesity, diabetes, history of TIA, CKD, chronic back pain. Patient follows with pain management, orthopedic surgeon. She has history of epidural shots in her back  Admitted with complaints of back pain which started today morning, yesterday she was bending frequently, she was collecting some old close to donate, and likely that has precipitated her back pain.   Back pain is radiating to left buttock and left leg, no difficulty in passing urine, stools  No injury, trauma, fall  In emergency room, patient was given multiple pain shots, with minimal benefit, patient is actively crying in the pain, only comfortable position is leaning on the bed  6/1  Patient still complaining of lower back pain  Morphine is not working  Past Medical History:     Past Medical History:   Diagnosis Date    Cervical spondylosis 4/2009    c-4 c-5, c-5 c-6, c-6 c-7    Generalized anxiety disorder 12/8/2020    Hyperlipidemia     Hypertension     Lumbar radiculopathy     DIEGO (nonalcoholic steatohepatitis) 10/12/2014    Obesity     Osteoarthritis of left knee 8/19/2014    Restless legs syndrome     Type II or unspecified type diabetes mellitus without mention of complication, not stated as uncontrolled         Past Surgical History: Past Surgical History:   Procedure Laterality Date     SECTION      COLONOSCOPY  2012    [facility]    HYSTERECTOMY, TOTAL ABDOMINAL  2008    Polymenorrhagia    SHOULDER SURGERY Left 2021     SHOULDER MANIPULATION WITH PRE OP INTERSCALENE BLOCK     SHOULDER SURGERY Left 2021    SHOULDER MANIPULATION WITH PRE OP INTERSCALENE BLOCK and intraop injection performed by Elieser Li DO at [address] [place] [address]  2012    [facility]        Medications Prior to Admission:     Prior to Admission medications    Medication Sig Start Date End Date Taking? Authorizing Provider   rosuvastatin (CRESTOR) 20 MG tablet TAKE ONE TABLET BY MOUTH NIGHTLY 22   Maryuri Mendes MD   celecoxib (CELEBREX) 100 MG capsule Take 1 capsule by mouth 2 times daily 4/15/22   Jammie Ramos MD   ASPIRIN LOW DOSE 81 MG EC tablet TAKE 1 TABLET BY MOUTH ONE TIME A DAY 22   Ibis Osullivan MD   gabapentin (NEURONTIN) 100 MG capsule Take one cap at 4 pm and 8 pm 4/6/22 10/6/22  Prosper Wilkins MD   tiZANidine (ZANAFLEX) 2 MG tablet Take one tab at 4 pm and 8 pm as needed for muscle spasms 22   Prosper Wilkins MD   gabapentin (NEURONTIN) 300 MG capsule Take 1 capsule by mouth 3 times daily for 30 days.  3/31/22 4/30/22  Juanita Stone MD   lisinopril (PRINIVIL;ZESTRIL) 40 MG tablet Take 1 tablet by mouth daily 3/18/22   Jose Constantino MD   glipiZIDE (GLUCOTROL) 10 MG tablet Take 1 tab bid 22   Jammie Ramos MD   busPIRone (BUSPAR) 10 MG tablet Take 1 tab three times daily as needed 22   Jammie Ramos MD   vitamin D (ERGOCALCIFEROL) 1.25 MG (20298 UT) CAPS capsule TAKE ONE CAPSULE BY MOUTH ONCE A WEEK (EVERY 7 DAYS) 22   [facility], MD   TRULICITY 1.5 PZ/6.3[facility] INJECT THE CONTENTS OF ONE SYRINGE UNDER THE SKIN ONCE WEEKLY 1/3/22   Ru Loving MD   chlorthalidone (HYGROTON) 25 MG tablet TAKE 1 TABLET BY MOUTH ONE TIME A DAY 11/16/21   Sangeeta Gonzalez MD   SYNJARDY 5-1000 MG TABS TAKE 1 TABLET BY MOUTH 2 TIMES A DAY 11/1/21   Andrews Gonzalez MD   omeprazole (PRILOSEC) 20 MG delayed release capsule Take 1 capsule by mouth 2 times daily 7/26/21   Andrews Gonzalez MD   rOPINIRole (REQUIP) 2 MG tablet TAKE 2 TABLETS BY MOUTH NIGHTLY 7/26/21   Andrews Gonzalez MD   albuterol sulfate HFA (PROVENTIL HFA) 108 (90 Base) MCG/ACT inhaler Inhale 2 puffs into the lungs every 4 hours as needed for Wheezing 4/20/21 4/20/22  Andrews Gonzalez MD   repaglinide (PRANDIN) 1 MG tablet TAKE 1 TABLET BY MOUTH 3 TIMES A DAY BEFORE MEALS 12/8/20   Andrews Gonzalez MD   blood glucose test strips (PRODIGY NO CODING BLOOD GLUC) strip Use to test once daily and as needed as directed by provider 11/12/20   Rafael Carballo MD   diclofenac sodium (VOLTAREN) 1 % GEL Apply 2 g topically 2 times daily  Patient taking differently: Apply 2 g topically 2 times daily Prn pain 11/12/20   Rafael Carballo MD   acetaminophen (ACETAMINOPHEN EXTRA STRENGTH) 500 MG tablet Take 1,000 mg by mouth daily as needed for Pain    Historical Provider, MD   bimatoprost (LUMIGAN) 0.01 % SOLN ophthalmic drops Place 1 drop into both eyes nightly 4/26/18   Historical Provider, MD   PRODIGY LANCETS 28G MISC 1 Bottle by Does not apply route three times daily 4/26/19   Andrews Gonzalez MD   Blood Glucose Monitoring Suppl (PRODIGY AUTOCODE BLOOD GLUCOSE) w/Device KIT 1 Device by Does not apply route 3 times daily 4/26/19   Andrews Gonzalez MD        Allergies:     Codeine    Social History:     Tobacco:    reports that she has never smoked. She has never used smokeless tobacco.  Alcohol:      reports current alcohol use. Drug Use:  reports no history of drug use.     Family History:     Family History   Problem Relation Age of Onset    Heart Attack Mother     Diabetes Mother     Diabetes Father     Heart Attack Father        Review of Systems:     Positive and Negative as described in HPI. CONSTITUTIONAL:  negative for fevers, chills, sweats, fatigue, weight loss  HEENT:  negative for vision, hearing changes, runny nose, throat pain  RESPIRATORY:  negative for shortness of breath, cough, congestion, wheezing. CARDIOVASCULAR:  negative for chest pain, palpitations. GASTROINTESTINAL:  negative for nausea, vomiting, diarrhea, constipation, change in bowel habits, abdominal pain   GENITOURINARY:  negative for difficulty of urination, burning with urination, frequency   INTEGUMENT:  negative for rash, skin lesions, easy bruising   HEMATOLOGIC/LYMPHATIC:  negative for swelling/edema   ALLERGIC/IMMUNOLOGIC:  negative for urticaria , itching  ENDOCRINE:  negative increase in drinking, increase in urination, hot or cold intolerance  MUSCULOSKELETAL: Lower back pain  NEUROLOGICAL:  negative for headaches, dizziness, lightheadedness, numbness, pain, tingling extremities  BEHAVIOR/PSYCH:      Physical Exam:     /79   Pulse 86   Temp 98.2 °F (36.8 °C) (Oral)   Resp 20   SpO2 95%   Temp (24hrs), Av °F (36.7 °C), Min:97.7 °F (36.5 °C), Max:98.2 °F (36.8 °C)    Recent Labs     22  1645 22  2008 22  0653 22  1132   POCGLU 278* 263* 198* 227*       Intake/Output Summary (Last 24 hours) at 2022 1411  Last data filed at 2022 2879  Gross per 24 hour   Intake 20 ml   Output --   Net 20 ml       General Appearance:  alert, well appearing, and in no acute distress, central obesity present  Mental status: oriented to person, place, and time with normal affect  Head:  normocephalic, atraumatic.   Eye: no icterus, redness, pupils equal and reactive, extraocular eye movements intact, conjunctiva clear  Ear: normal external ear, no discharge, hearing intact  Nose:  no drainage noted  Mouth: mucous membranes moist  Neck: supple, no carotid bruits, thyroid not palpable  Lungs: Bilateral equal air entry, clear to ausculation, no wheezing, rales or rhonchi, normal effort  Cardiovascular: normal rate, regular rhythm, no murmur, gallop, rub. Abdomen: Soft, nontender, nondistended, normal bowel sounds, no hepatomegaly or splenomegaly  Neurologic: Power in left leg is 5/5, patient is not moving left leg because of pain  Skin: No gross lesions, rashes, bruising or bleeding on exposed skin area  Extremities: Tenderness, and left buttock area  Psych:      Investigations:      Laboratory Testing:  Recent Results (from the past 24 hour(s))   POC Glucose Fingerstick    Collection Time: 05/31/22  4:45 PM   Result Value Ref Range    POC Glucose 278 (H) 65 - 105 mg/dL   Comprehensive Metabolic Panel w/ Reflex to MG    Collection Time: 05/31/22  4:54 PM   Result Value Ref Range    Glucose 301 (H) 70 - 99 mg/dL    BUN 34 (H) 8 - 23 mg/dL    CREATININE 1.58 (H) 0.50 - 0.90 mg/dL    Calcium 9.0 8.6 - 10.4 mg/dL    Sodium 139 135 - 144 mmol/L    Potassium 5.2 3.7 - 5.3 mmol/L    Chloride 100 98 - 107 mmol/L    CO2 23 20 - 31 mmol/L    Anion Gap 16 9 - 17 mmol/L    Alkaline Phosphatase 84 35 - 104 U/L    ALT 36 (H) 5 - 33 U/L    AST 27 <32 U/L    Total Bilirubin 0.31 0.3 - 1.2 mg/dL    Total Protein 7.5 6.4 - 8.3 g/dL    Albumin 4.5 3.5 - 5.2 g/dL    GFR Non- 33 (L) >60 mL/min    GFR African American 40 (L) >60 mL/min    GFR Comment         CBC with Auto Differential    Collection Time: 05/31/22  4:54 PM   Result Value Ref Range    WBC 10.1 3.5 - 11.0 k/uL    RBC 4.47 4.0 - 5.2 m/uL    Hemoglobin 12.6 12.0 - 16.0 g/dL    Hematocrit 39.5 36 - 46 %    MCV 88.5 80 - 100 fL    MCH 28.2 26 - 34 pg    MCHC 31.9 31 - 37 g/dL    RDW 14.0 11.5 - 14.9 %    Platelets 796 462 - 523 k/uL    MPV 7.6 6.0 - 12.0 fL    Seg Neutrophils 87 (H) 36 - 66 %    Lymphocytes 11 (L) 24 - 44 %    Monocytes 2 1 - 7 %    Eosinophils % 0 0 - 4 %    Basophils 0 0 - 2 %    Segs Absolute 8.80 1.3 - 9.1 k/uL    Absolute Lymph # 1.10 1.0 - 4.8 k/uL    Absolute Mono # 0.20 0.1 - 1.3 k/uL    Absolute Eos # 0.00 0.0 - 0.4 k/uL    Basophils Absolute 0.00 0.0 - 0.2 k/uL   POC Glucose Fingerstick    Collection Time: 05/31/22  8:08 PM   Result Value Ref Range    POC Glucose 263 (H) 65 - 105 mg/dL   POC Glucose Fingerstick    Collection Time: 06/01/22  6:53 AM   Result Value Ref Range    POC Glucose 198 (H) 65 - 105 mg/dL   POC Glucose Fingerstick    Collection Time: 06/01/22 11:32 AM   Result Value Ref Range    POC Glucose 227 (H) 65 - 105 mg/dL           Consultations:   IP CONSULT TO ORTHOPEDIC SURGERY  Assessment :      Primary Problem  Back pain    Active Hospital Problems    Diagnosis Date Noted    Back pain [M54.9] 05/31/2022     Priority: Medium    Diabetes mellitus type 2, uncontrolled ([address] [E11.65] 10/12/2014    Dyslipidemia [E78.5] 10/12/2014    DIEGO (nonalcoholic steatohepatitis) [K75.81] 10/12/2014    Hypertension [I10]    Principal Problem:    Back pain  Active Problems:    Hypertension    DIEGO (nonalcoholic steatohepatitis)    Dyslipidemia    Diabetes mellitus type 2, uncontrolled ([address]  Resolved Problems:    * No resolved hospital problems. *        Plan:     Lower back pain with left-sided sciatica, patient had lumbar spine MRI back in April of this year, suggestive of DJD, there is disc problem at the level of L5  Starting patient on Medrol Dosepak, Flexeril, morphine for pain control  Diabetes, restarting home medication, sliding scale  Orthopedic surgery consult  Lovenox for DVT prophylaxis    6/1  Patient is on Medrol Dosepak, Flexeril, starting patient on Percocet  Awaiting input from orthopedics        Navdeep Walsh MD  6/1/2022  2:11 PM    Copy sent to Dr. Jammie Ramos MD    Please note that this chart was generated using voice recognition Dragon dictation software. Although every effort was made to ensure the accuracy of this automated transcription, some errors in transcription may have occurred.

## 2022-06-01 NOTE — PLAN OF CARE
Please have patient or patients family member fill out online electronic MRI screening form. Once the form is complete we will send for the patient. If there are any questions please call [phone]. Thanks!

## 2022-06-01 NOTE — PLAN OF CARE
Problem: Discharge Planning  Goal: Discharge to home or other facility with appropriate resources  Outcome: Progressing  Flowsheets  Taken 5/31/2022 1930 by Fabrice Lara RN  Discharge to home or other facility with appropriate resources: Identify barriers to discharge with patient and caregiver  Taken 5/31/2022 1450 by Peng Gonzalez RN  Discharge to home or other facility with appropriate resources: Identify barriers to discharge with patient and caregiver     Problem: Safety - Adult  Goal: Free from fall injury  Outcome: Progressing  Flowsheets (Taken 5/31/2022 1930)  Free From Fall Injury: Instruct family/caregiver on patient safety     Problem: Pain  Goal: Verbalizes/displays adequate comfort level or baseline comfort level  Outcome: Progressing

## 2022-06-02 ENCOUNTER — APPOINTMENT (OUTPATIENT)
Dept: MRI IMAGING | Age: 62
DRG: 551 | End: 2022-06-02
Payer: COMMERCIAL

## 2022-06-02 ENCOUNTER — HOSPITAL ENCOUNTER (OUTPATIENT)
Dept: PHYSICAL THERAPY | Age: 62
Setting detail: THERAPIES SERIES
Discharge: HOME | End: 2022-06-02

## 2022-06-02 LAB
ANION GAP SERPL CALCULATED.3IONS-SCNC: 13 MMOL/L (ref 9–17)
BUN BLDV-MCNC: 38 MG/DL (ref 8–23)
CALCIUM SERPL-MCNC: 9.3 MG/DL (ref 8.6–10.4)
CHLORIDE BLD-SCNC: 101 MMOL/L (ref 98–107)
CO2: 22 MMOL/L (ref 20–31)
CREAT SERPL-MCNC: 1.14 MG/DL (ref 0.5–0.9)
GFR AFRICAN AMERICAN: 59 ML/MIN
GFR NON-AFRICAN AMERICAN: 48 ML/MIN
GFR SERPL CREATININE-BSD FRML MDRD: ABNORMAL ML/MIN/{1.73_M2}
GLUCOSE BLD-MCNC: 210 MG/DL (ref 65–105)
GLUCOSE BLD-MCNC: 257 MG/DL (ref 65–105)
GLUCOSE BLD-MCNC: 324 MG/DL (ref 65–105)
GLUCOSE BLD-MCNC: 352 MG/DL (ref 70–99)
POTASSIUM SERPL-SCNC: 5.1 MMOL/L (ref 3.7–5.3)
SODIUM BLD-SCNC: 136 MMOL/L (ref 135–144)

## 2022-06-02 PROCEDURE — 82947 ASSAY GLUCOSE BLOOD QUANT: CPT

## 2022-06-02 PROCEDURE — 97166 OT EVAL MOD COMPLEX 45 MIN: CPT

## 2022-06-02 PROCEDURE — 6370000000 HC RX 637 (ALT 250 FOR IP): Performed by: INTERNAL MEDICINE

## 2022-06-02 PROCEDURE — 80048 BASIC METABOLIC PNL TOTAL CA: CPT

## 2022-06-02 PROCEDURE — 6360000002 HC RX W HCPCS: Performed by: INTERNAL MEDICINE

## 2022-06-02 PROCEDURE — 96372 THER/PROPH/DIAG INJ SC/IM: CPT

## 2022-06-02 PROCEDURE — 6360000002 HC RX W HCPCS: Performed by: ORTHOPAEDIC SURGERY

## 2022-06-02 PROCEDURE — 97161 PT EVAL LOW COMPLEX 20 MIN: CPT

## 2022-06-02 PROCEDURE — 6370000000 HC RX 637 (ALT 250 FOR IP): Performed by: NURSE PRACTITIONER

## 2022-06-02 PROCEDURE — 72148 MRI LUMBAR SPINE W/O DYE: CPT

## 2022-06-02 PROCEDURE — G0378 HOSPITAL OBSERVATION PER HR: HCPCS

## 2022-06-02 PROCEDURE — 96376 TX/PRO/DX INJ SAME DRUG ADON: CPT

## 2022-06-02 PROCEDURE — 97535 SELF CARE MNGMENT TRAINING: CPT

## 2022-06-02 PROCEDURE — 99232 SBSQ HOSP IP/OBS MODERATE 35: CPT | Performed by: INTERNAL MEDICINE

## 2022-06-02 PROCEDURE — 36415 COLL VENOUS BLD VENIPUNCTURE: CPT

## 2022-06-02 PROCEDURE — 97530 THERAPEUTIC ACTIVITIES: CPT

## 2022-06-02 RX ORDER — BACLOFEN 10 MG/1
5 TABLET ORAL 3 TIMES DAILY
Status: DISCONTINUED | OUTPATIENT
Start: 2022-06-02 | End: 2022-06-07

## 2022-06-02 RX ORDER — OXYCODONE HCL 10 MG/1
10 TABLET, FILM COATED, EXTENDED RELEASE ORAL EVERY 12 HOURS SCHEDULED
Status: DISCONTINUED | OUTPATIENT
Start: 2022-06-02 | End: 2022-06-10 | Stop reason: HOSPADM

## 2022-06-02 RX ORDER — OXYCODONE HYDROCHLORIDE AND ACETAMINOPHEN 5; 325 MG/1; MG/1
1 TABLET ORAL EVERY 6 HOURS PRN
Status: DISCONTINUED | OUTPATIENT
Start: 2022-06-02 | End: 2022-06-07

## 2022-06-02 RX ORDER — INSULIN GLARGINE 100 [IU]/ML
10 INJECTION, SOLUTION SUBCUTANEOUS DAILY
Status: DISCONTINUED | OUTPATIENT
Start: 2022-06-02 | End: 2022-06-07

## 2022-06-02 RX ORDER — ALPRAZOLAM 1 MG/1
1 TABLET ORAL ONCE
Status: COMPLETED | OUTPATIENT
Start: 2022-06-02 | End: 2022-06-02

## 2022-06-02 RX ADMIN — OXYCODONE HYDROCHLORIDE AND ACETAMINOPHEN 1 TABLET: 5; 325 TABLET ORAL at 23:57

## 2022-06-02 RX ADMIN — LORAZEPAM 0.5 MG: 0.5 TABLET ORAL at 21:05

## 2022-06-02 RX ADMIN — INSULIN LISPRO 3 UNITS: 100 INJECTION, SOLUTION INTRAVENOUS; SUBCUTANEOUS at 11:29

## 2022-06-02 RX ADMIN — INSULIN LISPRO 5 UNITS: 100 INJECTION, SOLUTION INTRAVENOUS; SUBCUTANEOUS at 08:01

## 2022-06-02 RX ADMIN — OXYCODONE HYDROCHLORIDE 10 MG: 10 TABLET, FILM COATED, EXTENDED RELEASE ORAL at 13:15

## 2022-06-02 RX ADMIN — ENOXAPARIN SODIUM 40 MG: 100 INJECTION SUBCUTANEOUS at 07:57

## 2022-06-02 RX ADMIN — ROPINIROLE HYDROCHLORIDE 2 MG: 2 TABLET, FILM COATED ORAL at 20:36

## 2022-06-02 RX ADMIN — OXYCODONE HYDROCHLORIDE AND ACETAMINOPHEN 1 TABLET: 5; 325 TABLET ORAL at 09:33

## 2022-06-02 RX ADMIN — GABAPENTIN 300 MG: 300 CAPSULE ORAL at 13:59

## 2022-06-02 RX ADMIN — BUSPIRONE HYDROCHLORIDE 10 MG: 10 TABLET ORAL at 07:57

## 2022-06-02 RX ADMIN — GLIPIZIDE 10 MG: 5 TABLET ORAL at 05:16

## 2022-06-02 RX ADMIN — INSULIN LISPRO 2 UNITS: 100 INJECTION, SOLUTION INTRAVENOUS; SUBCUTANEOUS at 16:15

## 2022-06-02 RX ADMIN — ALPRAZOLAM 1 MG: 1 TABLET ORAL at 11:28

## 2022-06-02 RX ADMIN — LISINOPRIL 40 MG: 20 TABLET ORAL at 07:57

## 2022-06-02 RX ADMIN — OXYCODONE HYDROCHLORIDE AND ACETAMINOPHEN 1 TABLET: 5; 325 TABLET ORAL at 05:26

## 2022-06-02 RX ADMIN — BACLOFEN 5 MG: 10 TABLET ORAL at 20:36

## 2022-06-02 RX ADMIN — DEXAMETHASONE SODIUM PHOSPHATE 10 MG: 10 INJECTION INTRAMUSCULAR; INTRAVENOUS at 00:12

## 2022-06-02 RX ADMIN — OXYCODONE HYDROCHLORIDE 10 MG: 10 TABLET, FILM COATED, EXTENDED RELEASE ORAL at 21:08

## 2022-06-02 RX ADMIN — GABAPENTIN 300 MG: 300 CAPSULE ORAL at 20:36

## 2022-06-02 RX ADMIN — BUSPIRONE HYDROCHLORIDE 10 MG: 10 TABLET ORAL at 20:36

## 2022-06-02 RX ADMIN — BACLOFEN 5 MG: 10 TABLET ORAL at 13:59

## 2022-06-02 RX ADMIN — CYCLOBENZAPRINE 10 MG: 10 TABLET, FILM COATED ORAL at 06:52

## 2022-06-02 RX ADMIN — GLIPIZIDE 10 MG: 5 TABLET ORAL at 16:13

## 2022-06-02 RX ADMIN — LATANOPROST 1 DROP: 50 SOLUTION OPHTHALMIC at 21:07

## 2022-06-02 RX ADMIN — ROSUVASTATIN CALCIUM 20 MG: 20 TABLET, FILM COATED ORAL at 21:04

## 2022-06-02 RX ADMIN — INSULIN GLARGINE 10 UNITS: 100 INJECTION, SOLUTION SUBCUTANEOUS at 11:29

## 2022-06-02 RX ADMIN — GABAPENTIN 300 MG: 300 CAPSULE ORAL at 07:57

## 2022-06-02 RX ADMIN — DEXAMETHASONE SODIUM PHOSPHATE 10 MG: 10 INJECTION INTRAMUSCULAR; INTRAVENOUS at 05:16

## 2022-06-02 RX ADMIN — ASPIRIN 81 MG: 81 TABLET, COATED ORAL at 07:57

## 2022-06-02 RX ADMIN — PANTOPRAZOLE SODIUM 40 MG: 40 TABLET, DELAYED RELEASE ORAL at 05:16

## 2022-06-02 RX ADMIN — OXYCODONE HYDROCHLORIDE AND ACETAMINOPHEN 1 TABLET: 5; 325 TABLET ORAL at 17:21

## 2022-06-02 RX ADMIN — CHLORTHALIDONE 25 MG: 25 TABLET ORAL at 07:58

## 2022-06-02 ASSESSMENT — PAIN SCALES - GENERAL
PAINLEVEL_OUTOF10: 2
PAINLEVEL_OUTOF10: 9
PAINLEVEL_OUTOF10: 8
PAINLEVEL_OUTOF10: 7
PAINLEVEL_OUTOF10: 10
PAINLEVEL_OUTOF10: 8

## 2022-06-02 ASSESSMENT — PAIN DESCRIPTION - ORIENTATION
ORIENTATION: LEFT
ORIENTATION: LEFT;LOWER

## 2022-06-02 ASSESSMENT — PAIN DESCRIPTION - DESCRIPTORS
DESCRIPTORS: SHARP;SHOOTING
DESCRIPTORS: SHARP

## 2022-06-02 ASSESSMENT — PAIN - FUNCTIONAL ASSESSMENT
PAIN_FUNCTIONAL_ASSESSMENT: PREVENTS OR INTERFERES SOME ACTIVE ACTIVITIES AND ADLS
PAIN_FUNCTIONAL_ASSESSMENT: PREVENTS OR INTERFERES SOME ACTIVE ACTIVITIES AND ADLS

## 2022-06-02 ASSESSMENT — PAIN DESCRIPTION - LOCATION
LOCATION: BACK
LOCATION: BUTTOCKS;BACK

## 2022-06-02 ASSESSMENT — PAIN DESCRIPTION - FREQUENCY: FREQUENCY: CONTINUOUS

## 2022-06-02 ASSESSMENT — PAIN DESCRIPTION - ONSET: ONSET: ON-GOING

## 2022-06-02 NOTE — PLAN OF CARE
Problem: Discharge Planning  Goal: Discharge to home or other facility with appropriate resources  Outcome: Not Progressing  Flowsheets  Taken 6/2/2022 1815 by Martha Young RN  Discharge to home or other facility with appropriate resources:   Identify barriers to discharge with patient and caregiver   Arrange for needed discharge resources and transportation as appropriate   Identify discharge learning needs (meds, wound care, etc)  Taken 6/2/2022 0656 by Yuriy Horvath RN  Discharge to home or other facility with appropriate resources: Identify barriers to discharge with patient and caregiver     Problem: Safety - Adult  Goal: Free from fall injury  Outcome: Not Progressing  Flowsheets (Taken 6/2/2022 0832 by Yuriy Horvath RN)  Free From Fall Injury: Instruct family/caregiver on patient safety  Note: No injury replaced     Problem: Pain  Goal: Verbalizes/displays adequate comfort level or baseline comfort level  Outcome: Not Progressing  Flowsheets (Taken 6/1/2022 2142 by Tori Carrero RN)  Verbalizes/displays adequate comfort level or baseline comfort level:   Encourage patient to monitor pain and request assistance   Administer analgesics based on type and severity of pain and evaluate response   Assess pain using appropriate pain scale   Implement non-pharmacological measures as appropriate and evaluate response  Note: Medications changed per primary

## 2022-06-02 NOTE — FLOWSHEET NOTE
Patient, who is known to writer, shared her frustrated and anxiety over the level of pain and limited mobility she is experiencing and has been for a couple months. There is not a specific diagnosis or plan of care as far as the patient understands, although will probably be d/c to [facility] for rehab since she is not able to walk much or navigate steps, etc. Patient has a strong zion, and prayer is a foundational coping skill. Writer provided listening presence, emotional support, and prayer. 06/02/22 1817   Encounter Summary   Encounter Overview/Reason  Spiritual/Emotional Needs   Service Provided For: Patient   Referral/Consult From: Patient; Other    Support System Spouse; Children;Sabianism/zion community   Last Encounter  06/02/22   Complexity of Encounter Moderate   Begin Time 1615   End Time  1650   Total Time Calculated 35 min   Spiritual/Emotional needs   Type Spiritual Support;Emotional Distress   Assessment/Intervention/Outcome   Assessment Anxious; Concerns with suffering; Impaired resilience   Intervention Active listening;Discussed belief system/Latter day practices/zion;Discussed illness injury and its impact; Discussed meaning/purpose;Explored/Affirmed feelings, thoughts, concerns;Explored Coping Skills/Resources;Prayer (assurance of)/Springs;Sustaining Presence/Ministry of presence   Outcome Comfort;Engaged in conversation;Expressed feelings, needs, and concerns;Expressed Gratitude;Receptive;Venting emotion

## 2022-06-02 NOTE — PROGRESS NOTES
[facility]    CONSULTATION / HISTORY AND PHYSICAL EXAMINATION            Date:   6/2/2022  Patient name:  Laina Adjutant  Date of admission:  5/31/2022  5:16 AM  MRN:   [id number]  Account:  [de-identified]  YOB: 1960  PCP:    Paxton Zuñiga MD  Room:   2064/2064-01  Code Status:    Full Code    Physician Requesting Consult: Lex Cowden, MD    Reason for Consult:  medical management    Chief Complaint:     Chief Complaint   Patient presents with    Back Pain       History Obtained From:     Patient medical record nursing staff    History of Present Illness:   Patient has past medical history multiple medical problems, morbid obesity, diabetes, history of TIA, CKD, chronic back pain. Patient follows with pain management, orthopedic surgeon. She has history of epidural shots in her back  Admitted with complaints of back pain which started today morning, yesterday she was bending frequently, she was collecting some old close to donate, and likely that has precipitated her back pain.   Back pain is radiating to left buttock and left leg, no difficulty in passing urine, stools  No injury, trauma, fall  In emergency room, patient was given multiple pain shots, with minimal benefit, patient is actively crying in the pain, only comfortable position is leaning on the bed  6/1  Patient still complaining of lower back pain  Morphine is not working    6/2  Patient still complaining of severe back pain, could not get MRI done due to pain and anxiety  Evaluated by Ortho  Past Medical History:     Past Medical History:   Diagnosis Date    Cervical spondylosis 4/2009    c-4 c-5, c-5 c-6, c-6 c-7    Generalized anxiety disorder 12/8/2020    Hyperlipidemia     Hypertension     Lumbar radiculopathy     DIEGO (nonalcoholic steatohepatitis) 10/12/2014    Obesity     Osteoarthritis of left knee 8/19/2014    Restless legs syndrome     Type II or unspecified type diabetes mellitus without mention of complication, not stated as uncontrolled         Past Surgical History:     Past Surgical History:   Procedure Laterality Date     SECTION      COL[facility]OSCOPY  2012    [facility]    HYSTERECTOMY, TOTAL ABDOMINAL  2008    Polymenorrhagia    SHOULDER SURGERY Left 2021     SHOULDER MANIPULATION WITH PRE OP INTERSCALENE BLOCK     SHOULDER SURGERY Left 2021    SHOULDER MANIPULATION WITH PRE OP INTERSCALENE BLOCK and intraop injection performed by Reba Castellano DO at [address]  2012    [facility]        Medications Prior to Admission:     Prior to Admission medications    Medication Sig Start Date End Date Taking? Authorizing Provider   rosuvastatin (CRESTOR) 20 MG tablet TAKE ONE TABLET BY MOUTH NIGHTLY 22   Ольга Morejon MD   celecoxib (CELEBREX) 100 MG capsule Take 1 capsule by mouth 2 times daily 4/15/22   Inga Quintana MD   ASPIRIN LOW DOSE 81 MG EC tablet TAKE 1 TABLET BY MOUTH ONE TIME A DAY 22   Jocelyn Mccray MD   gabapentin (NEURONTIN) 100 MG capsule Take one cap at 4 pm and 8 pm 4/6/22 10/6/22  Oz Cisneros MD   tiZANidine (ZANAFLEX) 2 MG tablet Take one tab at 4 pm and 8 pm as needed for muscle spasms 22   Oz Cisneros MD   gabapentin (NEURONTIN) 300 MG capsule Take 1 capsule by mouth 3 times daily for 30 days.  3/31/22 4/30/22  Rebel Patiño MD   lisinopril (PRINIVIL;ZESTRIL) 40 MG tablet Take 1 tablet by mouth daily 3/18/22   Noel Scherer MD   glipiZIDE (GLUCOTROL) 10 MG tablet Take 1 tab bid 22   Inga Quintana MD   busPIRone (BUSPAR) 10 MG tablet Take 1 tab three times daily as needed 22   Inga Quintana MD   vitamin D (ERGOCALCIFEROL) 1.25 MG (52564 UT) CAPS capsule TAKE ONE CAPSULE BY MOUTH ONCE A WEEK (EVERY 7 DAYS) 22   MD MAHESH MohanULICITY 1.5 SL/6.6XO [facility] INJECT THE CONTENTS OF ONE SYRINGE UNDER THE SKIN ONCE WEEKLY 1/3/22   Lillian Freeman MD   chlorthalidone (HYGROTON) 25 MG tablet TAKE 1 TABLET BY MOUTH ONE TIME A DAY 11/16/21   Sangeeta Gonzalez MD   SYNJARDY 5-1000 MG TABS TAKE 1 TABLET BY MOUTH 2 TIMES A DAY 11/1/21   Bran Leija MD   omeprazole (PRILOSEC) 20 MG delayed release capsule Take 1 capsule by mouth 2 times daily 7/26/21   Bran Leija MD   rOPINIRole (REQUIP) 2 MG tablet TAKE 2 TABLETS BY MOUTH NIGHTLY 7/26/21   Bran Leija MD   albuterol sulfate HFA (PROVENTIL HFA) 108 (90 Base) MCG/ACT inhaler Inhale 2 puffs into the lungs every 4 hours as needed for Wheezing 4/20/21 4/20/22  Bran Leija MD   repaglinide (PRANDIN) 1 MG tablet TAKE 1 TABLET BY MOUTH 3 TIMES A DAY BEFORE MEALS 12/8/20   Bran Leija MD   blood glucose test strips (PRODIGY NO CODING BLOOD GLUC) strip Use to test once daily and as needed as directed by provider 11/12/20   Rufino Vigil MD   diclofenac sodium (VOLTAREN) 1 % GEL Apply 2 g topically 2 times daily  Patient taking differently: Apply 2 g topically 2 times daily Prn pain 11/12/20   Rufino Vigil MD   acetaminophen (ACETAMINOPHEN EXTRA STRENGTH) 500 MG tablet Take 1,000 mg by mouth daily as needed for Pain    Historical Provider, MD   bimatoprost (LUMIGAN) 0.01 % SOLN ophthalmic drops Place 1 drop into both eyes nightly 4/26/18   Historical Provider, MD   PRODIGY LANCETS 28G MISC 1 Bottle by Does not apply route three times daily 4/26/19   Bran Leija MD   Blood Glucose Monitoring Suppl (PRODIGY AUTOCODE BLOOD GLUCOSE) w/Device KIT 1 Device by Does not apply route 3 times daily 4/26/19   Bran Lejia MD        Allergies:     Codeine    Social History:     Tobacco:    reports that she has never smoked. She has never used smokeless tobacco.  Alcohol:      reports current alcohol use. Drug Use:  reports no history of drug use.     Family History:     Family History   Problem Relation Age of Onset    Heart Attack Mother     Diabetes Mother     Diabetes Father     Heart Attack Father        Review of Systems:     Positive and Negative as described in HPI. CONSTITUTIONAL:  negative for fevers, chills, sweats, fatigue, weight loss  HEENT:  negative for vision, hearing changes, runny nose, throat pain  RESPIRATORY:  negative for shortness of breath, cough, congestion, wheezing. CARDIOVASCULAR:  negative for chest pain, palpitations. GASTROINTESTINAL:  negative for nausea, vomiting, diarrhea, constipation, change in bowel habits, abdominal pain   GENITOURINARY:  negative for difficulty of urination, burning with urination, frequency   INTEGUMENT:  negative for rash, skin lesions, easy bruising   HEMATOLOGIC/LYMPHATIC:  negative for swelling/edema   ALLERGIC/IMMUNOLOGIC:  negative for urticaria , itching  ENDOCRINE:  negative increase in drinking, increase in urination, hot or cold intolerance  MUSCULOSKELETAL: Lower back pain  NEUROLOGICAL:  negative for headaches, dizziness, lightheadedness, numbness, pain, tingling extremities  BEHAVIOR/PSYCH:      Physical Exam:     /88   Pulse 84   Temp 98.1 °F (36.7 °C)   Resp 20   SpO2 96%   Temp (24hrs), Av.1 °F (36.7 °C), Min:97.9 °F (36.6 °C), Max:98.2 °F (36.8 °C)    Recent Labs     22  0653 22  1132 22  1619 22   POCGLU 198* 227* 209* 193*       Intake/Output Summary (Last 24 hours) at 2022 1050  Last data filed at 2022 0557  Gross per 24 hour   Intake 1831.6 ml   Output 2600 ml   Net -768.4 ml       General Appearance:  alert, well appearing, and in no acute distress, central obesity present  Mental status: oriented to person, place, and time with normal affect  Head:  normocephalic, atraumatic.   Eye: no icterus, redness, pupils equal and reactive, extraocular eye movements intact, conjunctiva clear  Ear: normal external ear, no discharge, hearing intact  Nose:  no drainage noted  Mouth: mucous membranes moist  Neck: supple, no carotid bruits, thyroid not palpable  Lungs: Bilateral equal air entry, clear to ausculation, no wheezing, rales or rhonchi, normal effort  Cardiovascular: normal rate, regular rhythm, no murmur, gallop, rub. Abdomen: Soft, nontender, nondistended, normal bowel sounds, no hepatomegaly or splenomegaly  Neurologic: Power in left leg is 5/5, patient is not moving left leg because of pain  Skin: No gross lesions, rashes, bruising or bleeding on exposed skin area  Extremities: Tenderness, and left buttock area  Psych:      Investigations:      Laboratory Testing:  Recent Results (from the past 24 hour(s))   POC Glucose Fingerstick    Collection Time: 06/01/22 11:32 AM   Result Value Ref Range    POC Glucose 227 (H) 65 - 105 mg/dL   POC Glucose Fingerstick    Collection Time: 06/01/22  4:19 PM   Result Value Ref Range    POC Glucose 209 (H) 65 - 105 mg/dL   POC Glucose Fingerstick    Collection Time: 06/01/22  8:03 PM   Result Value Ref Range    POC Glucose 193 (H) 65 - 105 mg/dL   Basic Metabolic Panel    Collection Time: 06/02/22  5:57 AM   Result Value Ref Range    Glucose 352 (H) 70 - 99 mg/dL    BUN 38 (H) 8 - 23 mg/dL    CREATININE 1.14 (H) 0.50 - 0.90 mg/dL    Calcium 9.3 8.6 - 10.4 mg/dL    Sodium 136 135 - 144 mmol/L    Potassium 5.1 3.7 - 5.3 mmol/L    Chloride 101 98 - 107 mmol/L    CO2 22 20 - 31 mmol/L    Anion Gap 13 9 - 17 mmol/L    GFR Non-African American 48 (L) >60 mL/min    GFR  59 (L) >60 mL/min    GFR Comment                 Consultations:   IP CONSULT TO ORTHOPEDIC SURGERY  Assessment :      Primary Problem  Back pain    Active Hospital Problems    Diagnosis Date Noted    Back pain [M54.9] 05/31/2022     Priority: Medium    Diabetes mellitus type 2, uncontrolled ([facility] [address] [E11.65] 10/12/2014    Dyslipidemia [E78.5] 10/12/2014    DIEGO (nonalcoholic steatohepatitis) [K75.81] 10/12/2014    Hypertension [I10]    Principal Problem:    Back pain  Active Problems:    Hypertension    DIEGO (nonalcoholic steatohepatitis)    Dyslipidemia    Diabetes mellitus type 2, uncontrolled ([address]  Resolved Problems:    * No resolved hospital problems. *        Plan:     Lower back pain with left-sided sciatica, patient had lumbar spine MRI back in April of this year, suggestive of DJD, there is disc problem at the level of L5  Starting patient on Medrol Dosepak, Flexeril, morphine for pain control  Diabetes, restarting home medication, sliding scale  Orthopedic surgery consult  Lovenox for DVT prophylaxis    6/1  Patient is on Medrol Dosepak, Flexeril, starting patient on Percocet  Awaiting input from orthopedics  6/2   Patient blood sugars going high due to Decadron  Starting patient on Lantus 10 unit  Putting patient on scheduled OxyContin extended release 10 mg twice a day with Percocet every 6 for breakthrough  Xanax 1 mg 30 minutes before the procedure  Patient does not like Flexeril, she feels that it is not helping  We will try with baclofen   will have MRI today      Norberto Romano MD  6/2/2022  10:50 AM    Copy sent to Dr. Dodie Hammans, MD    Please note that this chart was generated using voice recognition Dragon dictation software. Although every effort was made to ensure the accuracy of this automated transcription, some errors in transcription may have occurred.

## 2022-06-02 NOTE — CARE COORDINATION
ONGOING DISCHARGE PLAN:    Patient is alert and oriented x4. Spoke with patient regarding discharge plan and patient confirms that plan is still home with no needs. Pt does wish to have OUTPT PT orders,need orders for discharge. IV decadron x1 . Ortho following. Pain control. Will continue to follow for additional discharge needs.     Electronically signed by Fantasma Reinoso RN on 6/2/2022 at 2:16 PM

## 2022-06-02 NOTE — CARE COORDINATION
SW spoke with Patient about possible SNF at D/C for additional Therapy. Patient stated that she was considering [facility], but she wanted a list. SW provided her with a list, and SW stated she will stop back in tomorrow to discuss and answer any questions she has. Patient was in agreement.      Electronically signed by Jeronimo Anne on 6/2/22 at 4:11 PM EDT

## 2022-06-02 NOTE — PROGRESS NOTES
Physical Therapy  Facility/Department: [facility] MED SURG  Physical Therapy Initial Assessment    Name: Sania San  : 1960  MRN: [id number]  Date of Service: 2022    Discharge Recommendations:  Home with assist PRN,Patient would benefit from continued therapy after discharge   PT Equipment Recommendations  Equipment Needed: Yes  Mobility Devices: Nobie [place]: Rolling      Patient Diagnosis(es): The primary encounter diagnosis was Sciatica of left side. A diagnosis of Low back pain with sciatica, sciatica laterality unspecified, unspecified back pain laterality, unspecified chronicity was also pertinent to this visit. Past Medical History:  has a past medical history of Cervical spondylosis, Generalized anxiety disorder, Hyperlipidemia, Hypertension, Lumbar radiculopathy, DIEGO (nonalcoholic steatohepatitis), Obesity, Osteoarthritis of left knee, Restless legs syndrome, and Type II or unspecified type diabetes mellitus without mention of complication, not stated as uncontrolled. Past Surgical History:  has a past surgical history that includes Upper gastrointestinal endoscopy (2012); Colonoscopy (2012);  section; Hysterectomy, total abdominal (); shoulder surgery (Left, 2021); and shoulder surgery (Left, 2021).     Assessment   Body Structures, Functions, Activity Limitations Requiring Skilled Therapeutic Intervention: Increased pain;Decreased balance  Assessment: Impaired mobility due to pain  Decision Making: Low Complexity  History: LBP  Exam: decreased mobility; increased pain  Clinical Presentation: evolving  Barriers to Learning: no  Requires PT Follow-Up: Yes  Activity Tolerance  Activity Tolerance: Patient limited by pain     Plan   Plan  Plan: 1 time a day 7 days a week  Current Treatment Recommendations: Gait training,Stair training,Safety education & training     Restrictions  Restrictions/Precautions  Restrictions/Precautions: General Precautions Subjective   Pain: pt rates pain 5/10  General  Family / Caregiver Present: No  Follows Commands: Within Functional Limits  Subjective  Subjective: pt has c/c of L>R buttock pain with pain down L LE to foot         Social/Functional History  Social/Functional History  Lives With: Spouse,Daughter  Type of Home: Apartment  Home Layout: One level  Home Access: Stairs to enter with rails  Entrance Stairs - Number of Steps: 21  Entrance Stairs - Rails: Right  Bathroom Shower/Tub: Tub/Shower unit  Bathroom Toilet: Standard  Home Equipment: Cane  Has the patient had two or more falls in the past year or any fall with injury in the past year?: No  ADL Assistance: Independent  Ambulation Assistance: Independent  Transfer Assistance: Independent  Active : Yes  Mode of Transportation: [place]  Occupation: Full time employment       Cognition   Orientation  Overall Orientation Status: Within Normal Limits  Cognition  Overall Cognitive Status: WNL     Objective       AROM RLE (degrees)  RLE AROM: WFL  AROM LLE (degrees)  LLE AROM : WFL  Strength RLE  Strength RLE: WFL  Strength LLE  Strength LLE: WFL  Strength Other  Other: no MMT due to pain with AROM at LEs         Transfers  Sit to Stand: Stand by assistance  Stand to sit: Stand by assistance  Ambulation  Surface: level tile  Device: Rolling Walker  Assistance: Stand by assistance  Gait Deviations: Slow Kaelyn;Decreased step length;Decreased step height  Distance: 10ft x 2  Comments: pt walks forward flexed at hips  Stairs/Curb  Stairs?: Yes  Stairs  # Steps : 4  Stairs Height: 6\"  Rails: Right ascending  Device: Single pt cane  Assistance: Stand by assistance     Balance  Sitting - Static: Good  Sitting - Dynamic: Good  Standing - Static: Fair;+ (SBA without device)  Standing - Dynamic: Fair (SBA with RW)  Comments: pt unable to stand fully erect due to pain         Goals  Short Term Goals  Time Frame for Short term goals: 1-2 visits  Short term goal 1: gait with RW mod-I x 50-75ft to ambulate safely within home  Short term goal 2: negotiate flight of stairs with rail and SBA to safely enter home       Education  Patient Education  Education Given To: Patient  Education Provided Comments:  Instructed in stairs with one rail and cane and also side-stepping with one rail and using both hands  Education Method: Demonstration;Verbal  Barriers to Learning: None  Education Outcome: Demonstrated understanding      Therapy Time   Individual Concurrent Group Co-treatment   Time In 1343         Time Out 1420         Minutes 37         Timed Code Treatment Minutes: [facility] [facility],

## 2022-06-02 NOTE — PLAN OF CARE
Patient has continually been rating pain at 9-10 on painscale. Had no relief with morphine and medication was changed to Percocet which did offer a little better relief, enough so that she couls sleep. She has been up in the chair all day in a forward leaning position over the bed. I tried to get her in to bed and teach her how to reposition self with appropriate body mechanics and how to use pillows for comfort beneath the knees and between the knees when resting on her side. None of these techniques provided any relief of pain and in fact, made her left leg go numb and the pain worse, therefore we got her back up to the chair and allowed her to remain int he only position of comfort she could find. Patient was unable to tolerate the MRI this evening despite having had Percocet and valium prior to the procedure and the MRI tech trying to make her comfortable with pillows. If the test is rescheduled, they may need to consider heavier sedation. Up to bathroom with help using the cane. Is slow but steady. Legs are somewhat shaky and weak, but she is able to bear weight. Felt like she did a little better after having had Percocet the first time.

## 2022-06-02 NOTE — PROGRESS NOTES
[] [facility]) - [facility] & Therapy  [address]  [place]: [phone]   F: [phone]     Physical Therapy Cancel/No Show note    Date: 2022  Patient: Morena Vigil  : 1960  MRN: [id number]    Visit Count:   Cancels/No Shows to date:     For today's appointment patient:    [x]  Cancelled    [] Rescheduled appointment    [] No-show     Reason given by patient:    []  Patient ill    []  Conflicting appointment    [] No transportation      [] Conflict with work    [] No reason given    [] Weather related    [] COVID-19    [x] Other:      Comments: Patient in hospital currently for Lower back pain flare up per .        [] Next appointment was confirmed    Electronically signed by: Joao Pate PTA

## 2022-06-02 NOTE — PROGRESS NOTES
Occupational Therapy  Facility/Department: [facility]  Occupational Therapy Initial Assessment    Name: Morena Vigil  : 1960  MRN: [id number]  Date of Service: 2022         OT Equipment Recommendations  Equipment Needed: Yes  Other: written handout provided and adl equipment ed initiated re raised toilet seat, tub seat vs tub bench, hand held shower, long sponge, long shoe horn, sock aide, reachers. ed re sponge bathe and wash hair in basin if needed due to mobility -cannot access tub. Patient Diagnosis(es): The primary encounter diagnosis was Sciatica of left side. A diagnosis of Low back pain with sciatica, sciatica laterality unspecified, unspecified back pain laterality, unspecified chronicity was also pertinent to this visit. Past Medical History:  has a past medical history of Cervical spondylosis, Generalized anxiety disorder, Hyperlipidemia, Hypertension, Lumbar radiculopathy, DIEGO (nonalcoholic steatohepatitis), Obesity, Osteoarthritis of left knee, Restless legs syndrome, and Type II or unspecified type diabetes mellitus without mention of complication, not stated as uncontrolled. Past Surgical History:  has a past surgical history that includes Upper gastrointestinal endoscopy (2012); Colonoscopy (2012);  section; Hysterectomy, total abdominal (); shoulder surgery (Left, 2021); and shoulder surgery (Left, 2021). Assessment   Performance deficits / Impairments: Decreased functional mobility ; Decreased endurance;Decreased ADL status; Decreased sensation;Decreased posture;Decreased ROM; Decreased balance;Decreased strength;Decreased high-level IADLs  Prognosis: Fair  Decision Making: Medium Complexity  History: Low back pain with sciatica LLE buttocks to foot.  Lumbar MRI completed  showing \" Disc bulging at L5-S1 with asymmetric encroachment on the left lateral recessand abutment of the transversing left S1 nerve root\" which is not a significant change from MRI in March. pt notes numbness digits 4 + 5 LUE and LLE. Exam: 9 performance deficits  Assistance / Modification: mod due to pain, mobility  REQUIRES OT FOLLOW-UP: Yes  Activity Tolerance  Activity Tolerance: Patient limited by fatigue;Patient limited by pain  Activity Tolerance Comments: pain 8/10 on meds, seated with forward trunk flexion with heating pad. Plan   Plan  Times per Week: 4-5  Times per Day: Twice a day (1-2 times daily)  Current Treatment Recommendations: ROM,Balance training,Strengthening,Functional mobility training,Endurance training,Equipment evaluation, education, & procurement,Patient/Caregiver education & training,Pain management,Self-Care / ADL,Home management training     Restrictions  Restrictions/Precautions  Restrictions/Precautions: General Precautions  Position Activity Restriction  Other position/activity restrictions: back pain limiting mobility    Subjective   General  Additional Pertinent Hx: pt was in water physical therapy and making progress with back pain and ambulation deficits since March and recently achieved ambulation without cane. post doing house work with reaching and bending she experienced new onset severe back pain prompting this admit. Diagnosis: Low back pain with sciatica LLE buttocks to foot. Lumbar MRI completed 6-2 showing \" Disc bulging at L5-S1 with asymmetric encroachment on the left lateral recessand abutment of the transversing left S1 nerve root\" which is not a significant change from MRI in March. pt notes numbness digits 4 + 5 LUE and LLE. Subjective  Subjective: \"If you can call it sleep. \"  pt has been unable to tolerate being in bed and has been sleeping while seated in room chair beside bed with forward trunk flexion to rest arms and head onto bed.   Pain: pt rates pain 5/10  Social/Functional History  Social/Functional History  Lives With: Spouse,Daughter  Type of Home: Apartment  Home Layout: One level  Home Access: Stairs to enter with rails  Entrance Stairs - Number of Steps: 21  Entrance Stairs - Rails: Right  Bathroom Shower/Tub: Tub/Shower unit  Bathroom Toilet: Standard  Home Equipment: Cane  Has the patient had two or more falls in the past year or any fall with injury in the past year?: No  ADL Assistance: Independent  Homemaking Assistance: Needs assistance  Homemaking Responsibilities: Yes  Ambulation Assistance: Independent  Transfer Assistance: Independent  Active : Yes  Mode of Transportation: [facility]  Occupation: Full time employment  Type of Occupation: pt works from home for [employer] with [employer]. was on leave due to back issues and recently returned to work May 5. IADL Comments: has stackable washer, dryer, needs assist to reach dryer, spouse does most cooking. Objective   Heart Rate: 84  BP: 138/88  MAP (Calculated): 104.67  Resp: 16  SpO2: 96 %  Vision Exceptions: Wears glasses at all times  Hearing: Within functional limits             Balance  Standing: With support  Transfer Training  Transfer Training: Yes  Overall Level of Assistance: Contact-guard assistance (RW)  Sit to Stand: Contact-guard assistance  Stand to Sit: Contact-guard assistance  Gait  Overall Level of Assistance: Contact-guard assistance  Step Length:  (difficult for pt to lift LLE during ambulation and needed assist to lift LLEonto legrest on w/c)  Distance (ft): 4 Feet  Assistive Device: Cane, straight     Strength:  (unable to assess due to back pain)  Coordination: Within functional limits  Tone: Normal  Sensation: Impaired (pt notes numbness digits 4 + 5 LUE and LLE.)  ADL  Feeding: Independent  Grooming: Setup  UE Bathing: Setup  LE Bathing: Setup; Moderate assistance  UE Dressing: Setup  LE Dressing: Setup; Moderate assistance  Toileting: Minimal assistance  Additional Comments: pre pt report: pt showered today,seated on tub seat, she could reach to ankles to wash, remained seated and leaned to wash most of bottom. pt has been ambulating with cane and CGA into bathroom for toileting, this pm walker provided for pt and she notes she feels more steady with this compared to cane. Activity Tolerance  Activity Tolerance: Patient limited by pain  Bed mobility  Bed Mobility Comments: pt has been unable to tolerate being in bed and has been sleeping while seated in room chair beside bed with forward trunk flexion to rest arms and head onto bed. pt sits like this when awake too in effort to keep pain manageable. pt in this position upon OT arrival am and pm.  Transfers  Stand Step Transfers: Contact guard assistance  Stand Pivot Transfers: Contact guard assistance  Sit to stand: Contact guard assistance  Stand to sit: Contact guard assistance  Transfer Comments: from chair to w/c to go to MRI     Cognition  Overall Cognitive Status: WNL               Dynamic Standing Balance Exercises: 3 minutes  Education Given To: Patient  Education Provided: Role of Therapy;Plan of Care;Equipment;Precautions; ADL Adaptive Strategies;Transfer Training  Education Provided Comments: written handout provided and adl equipment ed initiated re raised toilet seat, tub seat vs tub bench, hand held shower, long sponge, long shoe horn, sock aide, reachers. ed re sponge bathe and wash hair in basin if needed due to mobility -cannot access tub.   Education Method: Demonstration;Verbal;Teach Back  Barriers to Learning: None  Education Outcome: Continued education needed;Verbalized understanding  LUE AROM (degrees)  LUE AROM : Exceptions  L Shoulder Flexion 0-180: 0-100- limited by pulling pain in back  RUE AROM (degrees)  RUE AROM : [facility]                                                                       AM-PAC Score        AM-PAC Inpatient Daily Activity Raw Score: 17 (06/02/22 1635)  AM-PAC Inpatient ADL T-Scale Score : 37.26 (06/02/22 1635)  ADL Inpatient CMS 0-100% Score: 50.11 (06/02/22 1635)  ADL Inpatient CMS G-Code Modifier : CK (06/02/22 0325)    Goals  Short Term Goals  Time Frame for Short term goals: 1 week  Short Term Goal 1: mod indep LE bathe and dress  Short Term Goal 2: mod indep amb to obtain set up for adls with fair pain control  Short Term Goal 3: carter 6-8 min stand, ambulate for adls with fair pain control  Short Term Goal 4: indep with UE HEP for LUE ROM and BUE strengthening (when tolerated)  Short Term Goal 5: pt to verbalize understanding of bathroom equipment which may be helpful to her.   Patient Goals   Patient goals : return to prior level of indep with adls including work and amb without cane       Therapy Time   Individual Concurrent Group Co-treatment   Time In 1507         Time Out 1550         Minutes 43             also 12:54 to 12:21 this am    Kathleen Hernandez, OT

## 2022-06-03 ENCOUNTER — TELEPHONE (OUTPATIENT)
Dept: FAMILY MEDICINE CLINIC | Age: 62
End: 2022-06-03

## 2022-06-03 LAB
GLUCOSE BLD-MCNC: 225 MG/DL (ref 65–105)
GLUCOSE BLD-MCNC: 256 MG/DL (ref 65–105)
GLUCOSE BLD-MCNC: 266 MG/DL (ref 65–105)
GLUCOSE BLD-MCNC: 295 MG/DL (ref 65–105)

## 2022-06-03 PROCEDURE — 97530 THERAPEUTIC ACTIVITIES: CPT

## 2022-06-03 PROCEDURE — 97535 SELF CARE MNGMENT TRAINING: CPT

## 2022-06-03 PROCEDURE — 97110 THERAPEUTIC EXERCISES: CPT

## 2022-06-03 PROCEDURE — G0378 HOSPITAL OBSERVATION PER HR: HCPCS

## 2022-06-03 PROCEDURE — 82947 ASSAY GLUCOSE BLOOD QUANT: CPT

## 2022-06-03 PROCEDURE — 6360000002 HC RX W HCPCS: Performed by: INTERNAL MEDICINE

## 2022-06-03 PROCEDURE — 99232 SBSQ HOSP IP/OBS MODERATE 35: CPT | Performed by: INTERNAL MEDICINE

## 2022-06-03 PROCEDURE — 96372 THER/PROPH/DIAG INJ SC/IM: CPT

## 2022-06-03 PROCEDURE — 6370000000 HC RX 637 (ALT 250 FOR IP): Performed by: INTERNAL MEDICINE

## 2022-06-03 PROCEDURE — 97116 GAIT TRAINING THERAPY: CPT

## 2022-06-03 RX ADMIN — BACLOFEN 5 MG: 10 TABLET ORAL at 14:30

## 2022-06-03 RX ADMIN — LISINOPRIL 40 MG: 20 TABLET ORAL at 09:31

## 2022-06-03 RX ADMIN — GABAPENTIN 300 MG: 300 CAPSULE ORAL at 09:30

## 2022-06-03 RX ADMIN — BUSPIRONE HYDROCHLORIDE 10 MG: 10 TABLET ORAL at 21:42

## 2022-06-03 RX ADMIN — ENOXAPARIN SODIUM 40 MG: 100 INJECTION SUBCUTANEOUS at 09:30

## 2022-06-03 RX ADMIN — GABAPENTIN 300 MG: 300 CAPSULE ORAL at 21:43

## 2022-06-03 RX ADMIN — BACLOFEN 5 MG: 10 TABLET ORAL at 21:42

## 2022-06-03 RX ADMIN — BACLOFEN 5 MG: 10 TABLET ORAL at 09:30

## 2022-06-03 RX ADMIN — LATANOPROST 1 DROP: 50 SOLUTION OPHTHALMIC at 21:43

## 2022-06-03 RX ADMIN — ASPIRIN 81 MG: 81 TABLET, COATED ORAL at 09:31

## 2022-06-03 RX ADMIN — GLIPIZIDE 10 MG: 5 TABLET ORAL at 14:30

## 2022-06-03 RX ADMIN — OXYCODONE HYDROCHLORIDE 10 MG: 10 TABLET, FILM COATED, EXTENDED RELEASE ORAL at 21:42

## 2022-06-03 RX ADMIN — CHLORTHALIDONE 25 MG: 25 TABLET ORAL at 09:31

## 2022-06-03 RX ADMIN — ROPINIROLE HYDROCHLORIDE 2 MG: 2 TABLET, FILM COATED ORAL at 20:26

## 2022-06-03 RX ADMIN — ROSUVASTATIN CALCIUM 20 MG: 20 TABLET, FILM COATED ORAL at 21:42

## 2022-06-03 RX ADMIN — GABAPENTIN 300 MG: 300 CAPSULE ORAL at 14:30

## 2022-06-03 RX ADMIN — INSULIN LISPRO 3 UNITS: 100 INJECTION, SOLUTION INTRAVENOUS; SUBCUTANEOUS at 12:20

## 2022-06-03 RX ADMIN — OXYCODONE HYDROCHLORIDE 10 MG: 10 TABLET, FILM COATED, EXTENDED RELEASE ORAL at 09:30

## 2022-06-03 RX ADMIN — INSULIN LISPRO 2 UNITS: 100 INJECTION, SOLUTION INTRAVENOUS; SUBCUTANEOUS at 17:23

## 2022-06-03 RX ADMIN — BUSPIRONE HYDROCHLORIDE 10 MG: 10 TABLET ORAL at 09:31

## 2022-06-03 RX ADMIN — INSULIN GLARGINE 10 UNITS: 100 INJECTION, SOLUTION SUBCUTANEOUS at 09:33

## 2022-06-03 RX ADMIN — INSULIN LISPRO 3 UNITS: 100 INJECTION, SOLUTION INTRAVENOUS; SUBCUTANEOUS at 09:33

## 2022-06-03 RX ADMIN — INSULIN LISPRO 1 UNITS: 100 INJECTION, SOLUTION INTRAVENOUS; SUBCUTANEOUS at 21:44

## 2022-06-03 ASSESSMENT — PAIN - FUNCTIONAL ASSESSMENT
PAIN_FUNCTIONAL_ASSESSMENT: PREVENTS OR INTERFERES WITH MANY ACTIVE NOT PASSIVE ACTIVITIES
PAIN_FUNCTIONAL_ASSESSMENT: PREVENTS OR INTERFERES WITH ALL ACTIVE AND SOME PASSIVE ACTIVITIES

## 2022-06-03 ASSESSMENT — PAIN DESCRIPTION - LOCATION
LOCATION: BACK
LOCATION: BACK

## 2022-06-03 ASSESSMENT — PAIN DESCRIPTION - PAIN TYPE
TYPE: ACUTE PAIN
TYPE: ACUTE PAIN

## 2022-06-03 ASSESSMENT — PAIN DESCRIPTION - DESCRIPTORS
DESCRIPTORS: SHOOTING;SHARP
DESCRIPTORS: SHARP

## 2022-06-03 ASSESSMENT — PAIN DESCRIPTION - FREQUENCY
FREQUENCY: CONTINUOUS
FREQUENCY: CONTINUOUS

## 2022-06-03 ASSESSMENT — PAIN DESCRIPTION - ORIENTATION
ORIENTATION: LOWER
ORIENTATION: LOWER

## 2022-06-03 ASSESSMENT — PAIN SCALES - GENERAL
PAINLEVEL_OUTOF10: 8
PAINLEVEL_OUTOF10: 8
PAINLEVEL_OUTOF10: 9

## 2022-06-03 NOTE — DISCHARGE INSTR - COC
Continuity of Care Form    Patient Name: Sapphire Martinez   :  1960  MRN:  [id number]    Admit date:  2022  Discharge date:  ***    Code Status Order: Full Code   Advance Directives:      Admitting Physician:  Sherin Almaraz MD  PCP: Archana George MD    Discharging Nurse: [facility] Unit/Room#: 2064/2064-01  Discharging Unit Phone Number: ***    Emergency Contact:   Extended Emergency Contact Information  Primary Emergency Contact: Margarita Henderson  Address: [address] [place], [address] Vince Kocher of [address] Phone: [phone]  Mobile Phone: [phone]  Relation: Spouse  Secondary Emergency Contact: [facility] Phone: [phone]  Mobile Phone: [phone]  Relation: Child    Past Surgical History:  Past Surgical History:   Procedure Laterality Date     SECTION      COLONOSCOPY  2012    [facility]    HYSTERECTOMY, TOTAL ABDOMINAL  2008    Polymenorrhagia    SHOULDER SURGERY Left 2021     SHOULDER MANIPULATION WITH PRE OP INTERSCALENE BLOCK     SHOULDER SURGERY Left 2021    SHOULDER MANIPULATION WITH PRE OP INTERSCALENE BLOCK and intraop injection performed by Krishna Healy DO at [address] [facility]  2012    [facility]       Immunization History:   Immunization History   Administered Date(s) Administered    COVID-19, Moderna, Primary or Immunocompromised, PF, 100mcg/0.5mL 2021, 2021    DTaP 12/15/2010    DTaP vaccine 12/15/2010    Influenza Virus Vaccine 2013, 2015, 2016, 10/31/2017    Influenza Whole 2013    Influenza, Quadv, 6 mo and older, IM (Fluzone, Flulaval) 10/31/2017    Influenza, Quadv, IM, (6 mo and older Fluzone, Flulaval, Fluarix and 3 yrs and older Afluria) 2016, 10/31/2017    Influenza, Quadv, IM, PF (6 mo and older Fluzone, Flulaval, Fluarix, and 3 yrs and older Afluria) 2016, 2018, 2019, 2020, Temp 97.7 °F (36.5 °C)   Resp 19   SpO2 98%     Last documented pain score (0-10 scale): Pain Level: 8  Last Weight:   Wt Readings from Last 1 Encounters:   06/01/22 202 lb (91.6 kg)     Mental Status:  oriented and alert    IV Access:  - None    Nursing Mobility/ADLs:  Walking   Independent  Transfer  Assisted  Bathing  Assisted  Dressing  Assisted  Toileting  Independent  Feeding  Independent  Med Admin  Independent  Med Delivery   whole    Wound Care Documentation and Therapy:  Incision 02/17/21 Shoulder Left (Active)   Number of days: 470        Elimination:  Continence: Bowel: Yes  Bladder: No  Urinary Catheter: {Urinary Catheter:254897604}   Colostomy/Ileostomy/Ileal Conduit: {YES / TO:16120}       Date of Last BM: ***  No intake or output data in the 24 hours ending 06/03/22 1119  I/O last 3 completed shifts:  In: -   Out: 1600 [Urine:1600]    Safety Concerns:     None    Impairments/Disabilities:      None    Nutrition Therapy:  Current Nutrition Therapy:   - Oral Diet:  General    Routes of Feeding: Oral  Liquids: No Liquids  Daily Fluid Restriction: no  Last Modified Barium Swallow with Video (Video Swallowing Test): not done    Treatments at the Time of Hospital Discharge:   Respiratory Treatments: ***  Oxygen Therapy:  is not on home oxygen therapy. Ventilator:    - No ventilator support    Rehab Therapies: Physical Therapy and Occupational Therapy  Weight Bearing Status/Restrictions: No weight bearing restrictions  Other Medical Equipment (for information only, NOT a DME order):  {EQUIPMENT:537454527}  Other Treatments: Skilled Nursing assessment and monitoring. Medication education and monitoring per protocol.       Patient's personal belongings (please select all that are sent with patient):  Betsy SWENSON SIGNATURE:  {Esignature:534201194}    CASE MANAGEMENT/SOCIAL WORK SECTION    Inpatient Status Date: ***    Readmission Risk Assessment Score:  Readmission Risk              Risk of Unplanned Readmission:  0           Discharging to Facility/ Radha Glass  Phone: [phone]  Fax: [phone]     Dialysis Facility (if applicable)   Name:  Address:  Dialysis Schedule:  Phone:  Fax:    / signature: Electronically signed by Johanna Alvraez RN on 6/3/22 at 11:19 AM EDT    PHYSICIAN SECTION    Prognosis: {Prognosis:6824931608}    Condition at Discharge: [facility] Joslyn Talley Patient Condition:818587839}    Rehab Potential (if transferring to Rehab): {Prognosis:9227360309}    Recommended Labs or Other Treatments After Discharge: ***    Physician Certification: I certify the above information and transfer of Tiarra Tran  is necessary for the continuing treatment of the diagnosis listed and that she requires {Admit to Appropriate Level of Care:23308} for {GREATER/LESS:565717196} 30 days.      Update Admission H&P: {CHP DME Changes in PZQMQ:815245904}    PHYSICIAN SIGNATURE:  Electronically signed by Alissa Asif MD on 6/9/22 at 10:31 AM EDT

## 2022-06-03 NOTE — CARE COORDINATION
SW attempted to meet with patient to follow up regarding SNF facility list given yesterday. Patient was asleep , SW did not awaken her. SW will stop by again later.     Electronically signed by Tawana Pinon on 6/3/22 at 8:37 AM EDT

## 2022-06-03 NOTE — PROGRESS NOTES
Physical Therapy  Facility/Department: [facility] MED SURG  Daily Treatment Note  NAME: Mel Kaur  : 1960  MRN: [id number]    Date of Service: 6/3/2022    Discharge Recommendations:  Home with assist PRN,Patient would benefit from continued therapy after discharge   PT Equipment Recommendations  Equipment Needed: Yes  Mobility Devices: Aidan Reno: Rolling    Patient Diagnosis(es): The primary encounter diagnosis was Sciatica of left side. A diagnosis of Low back pain with sciatica, sciatica laterality unspecified, unspecified back pain laterality, unspecified chronicity was also pertinent to this visit. Assessment   Activity Tolerance: Patient limited by pain  Equipment Needed: Yes  Mobility Devices: [address]: 1 time a day 7 days a week  Current Treatment Recommendations: Gait training;Stair training; Safety education & training     Restrictions  Restrictions/Precautions  Restrictions/Precautions: General Precautions  Position Activity Restriction  Other position/activity restrictions: back pain limiting mobility     Subjective    Subjective  Subjective: Pt reports having high pain with all activities. Pain: Pain 9/10  Orientation  Overall Orientation Status: Within Normal Limits  Cognition  Overall Cognitive Status: WNL     Objective   Vitals     Balance  Sitting:  (RW positioning)  Standing: Impaired (RW, pt tolerates 3 min stand during func mobility, VCs for R)  Standing - Static: Fair (heavily forward flexed posture)  Standing - Dynamic: Poor (Head downward and torso forward flexed)  Transfer Training  Transfer Training: Yes  Overall Level of Assistance: Contact-guard assistance (RW)  Sit to Stand: Contact-guard assistance  Stand to Sit: Contact-guard assistance (VCs for hand placement)  Gait Training  Gait Training: Yes  Gait  Overall Level of Assistance: Contact-guard assistance (2nd person SBA for safety)  Interventions: Safety awareness training; Tactile cues; Verbal cues;Manual cues  Base of Support: Widened  Speed/Kaelyn: Slow;Pace decreased (< 100 feet/min)  Step Length: Left shortened;Right shortened (difficult for pt to lift LLE during ambulation and needed assist to lift LLEonto legrest on w/c)  Distance (ft): 20 Feet  Assistive Device: Walker, rolling     PT Exercises  A/AROM Exercises: Standing posterior pelvic tilt exercises, Seated posterior pelvic tilt, pt reports unable to tolerate laying down for exercises. Dynamic Sitting Balance Exercises: Seated dynamic reaching out of base of support, no LOB (limited range)  Static Standing Balance Exercises: Static Standing tolerance 90sec             Goals  Short Term Goals  Time Frame for Short term goals: 1-2 visits  Short term goal 1: gait with RW mod-I x 50-75ft to ambulate safely within home  Short term goal 2: negotiate flight of stairs with rail and SBA to safely enter home    Education  Patient Education  Education Given To: Patient  Education Provided Comments:  Instructed in stairs with one rail and cane and also side-stepping with one rail and using both hands  Education Method: Demonstration;Verbal  Barriers to Learning: None  Education Outcome: Demonstrated understanding    Therapy Time   Individual Concurrent Group Co-treatment   Time In 1414         Time Out 1449         Minutes [address]

## 2022-06-03 NOTE — PLAN OF CARE
Problem: Discharge Planning  Goal: Discharge to home or other facility with appropriate resources  Outcome: Progressing     Problem: Safety - Adult  Goal: Free from fall injury  Outcome: Progressing  Note: Patient remains free of falls and injuries throughout shift. Bed remains in the lowest position, wheels locked, call light and bedside table are within reach. Problem: Pain  Goal: Verbalizes/displays adequate comfort level or baseline comfort level  Outcome: Progressing  Note: Assess the location, characteristics, onset, duration, frequency, quality, and severity of pain. Encourage immediate report of pain. Use appropriate pain scale to rate pain. Manage pain using nonpharmacologic/pharmacologic interventions.       Problem: Chronic Conditions and Co-morbidities  Goal: Patient's chronic conditions and co-morbidity symptoms are monitored and maintained or improved  Outcome: Progressing     Problem: ABCDS Injury Assessment  Goal: Absence of physical injury  Outcome: Progressing

## 2022-06-03 NOTE — PROGRESS NOTES
Occupational Therapy  Facility/Department: [facility] MED SURG  Daily Treatment Note  NAME: Raghav Fitzpatrick  : 1960  MRN: [id number]    Date of Service: 6/3/2022    Discharge Recommendations:     OT Equipment Recommendations  Other: written handout provided and adl equipment ed initiated re raised toilet seat, tub seat vs tub bench, hand held shower, long sponge, long shoe horn, sock aide, reachers. Patient Diagnosis(es): The primary encounter diagnosis was Sciatica of left side. A diagnosis of Low back pain with sciatica, sciatica laterality unspecified, unspecified back pain laterality, unspecified chronicity was also pertinent to this visit. Assessment    Activity Tolerance: Patient limited by pain     Plan   Plan  Times per Week: 4-5  Times per Day: Twice a day (1-2 times daily)  Current Treatment Recommendations: ROM;Balance training;Strengthening; Functional mobility training; Endurance training;Equipment evaluation, education, & procurement;Patient/Caregiver education & training;Pain management;Self-Care / ADL; Home management training     Restrictions       Subjective   Subjective  Subjective: pt sitting in chair with UB laying on bed. pt stated that her back problems have been bad since March and this is the most comfortable position for her at this time.   Orientation  Overall Orientation Status: Within Normal Limits  Cognition  Overall Cognitive Status: WNL        Objective    Vitals  Vitals  BP Location: Right upper arm  O2 Device: None (Room air)  Balance  Sitting:  With support   Standing: With support (RW, pt tolerates 3 min stand during func mobility, VCs for RW positioning)  Transfer Training  Transfer Training: Yes  Overall Level of Assistance: Contact-guard assistance (RW)  Sit to Stand: Contact-guard assistance  Stand to Sit: Contact-guard assistance (VCs for hand placement)  Gait  Overall Level of Assistance: Contact-guard assistance    Assistive Device: Walker, rolling     ADL  Feeding: Independent  Grooming: Setup  UE Bathing: Setup  LE Bathing: Setup; Moderate assistance  UE Dressing: Setup  LE Dressing: Setup; Moderate assistance  Toileting: Minimal assistance  Additional Comments: answers based on skilled observation/reasoniing., pt edu on AE/DME to increase independence and safety during ADLs and transfers. ie reacher, tub transfer bench, GB placement, shower chair,               Patient Education  Education Given To: Patient  Education Provided: Role of Therapy;Plan of Care;Equipment;Precautions; ADL Adaptive Strategies;Transfer Training; Fall Prevention Strategies  Education Provided Comments: written handout provided and adl equipment ed initiated re raised toilet seat, tub seat vs tub bench, hand held shower, long sponge, long shoe horn, sock aide, reachers. ed re sponge bathe and wash hair in basin if needed due to mobility -cannot access tub. Education Method: Demonstration;Verbal;Video;Printed Information/Hand-outs  Barriers to Learning: None  Education Outcome: Continued education needed;Verbalized understanding    Goals  Short Term Goals  Time Frame for Short term goals: 1 week  Short Term Goal 1: mod indep LE bathe and dress  Short Term Goal 2: mod indep amb to obtain set up for adls with fair pain control  Short Term Goal 3: carter 6-8 min stand, ambulate for adls with fair pain control  Short Term Goal 4: indep with UE HEP for LUE ROM and BUE strengthening (when tolerated)  Short Term Goal 5: pt to verbalize understanding of bathroom equipment which may be helpful to her.   Patient Goals   Patient goals : return to prior level of indep with adls including work and amb without cane       Therapy Time   Individual Concurrent Group Co-treatment   Time In 1415         Time Out 1446         Minutes [address], [place]

## 2022-06-03 NOTE — PROGRESS NOTES
Patient having difficulty ambulating and being able to sleep in bed. Patient tried to move into bed but was unable after a few minutes to stay there and is in chair with head on bed for ease of pain. Pain medication that is given lasts for very short amounts of time.  Patient given heating pad for comfort

## 2022-06-03 NOTE — CARE COORDINATION
SW notified by Tayler Stover RN that patient would like 1. [facility] 2. [facility] 3. [facility] for SNF preference. Referrals sent to the places listed above . SW will follow up.      Electronically signed by Radha Conway on 6/3/22 at 11:11 AM EDT

## 2022-06-03 NOTE — TELEPHONE ENCOUNTER
Wanted you to know that she is in [facility] for her back issue, and she will be going to a rehab facility and she will have another set of [place] paperwork faxed for you to complete. Does patient have enough medication for 72 hours: Yes    Next Visit Date:  Future Appointments   Date Time Provider Nicola Zaidi   7/6/2022  1:00  Geovani López MD Neuro  [place]   7/28/2022  8:30 AM Archana George MD [address] [place] [address] Maintenance   Topic Date Due    Shingles vaccine (1 of 2) Never done    Diabetic retinal exam  11/01/2017    Diabetic foot exam  12/01/2017    COVID-19 Vaccine (3 - Booster for Moderna series) 07/11/2021    Diabetic microalbuminuria test  06/11/2022    Lipids  06/24/2022    DTaP/Tdap/Td vaccine (2 - Tdap) 07/26/2022 (Originally 12/15/2020)    Pneumococcal 0-64 years Vaccine (2 - PCV) 07/26/2022 (Originally 11/27/2016)    Colorectal Cancer Screen  09/20/2022    Breast cancer screen  11/14/2022    A1C test (Diabetic or Prediabetic)  05/26/2023    Depression Screen  05/26/2023    Flu vaccine  Completed    Hepatitis C screen  Completed    HIV screen  Completed    Hepatitis A vaccine  Aged Out    Hib vaccine  Aged Out    Meningococcal (ACWY) vaccine  Aged Out       Hemoglobin A1C (%)   Date Value   05/26/2022 7.9   09/14/2021 7.7   06/24/2021 7.9 (H)             ( goal A1C is < 7)   Microalb/Crt.  Ratio (mcg/mg creat)   Date Value   06/11/2021 CANNOT BE CALCULATED     LDL Cholesterol (mg/dL)   Date Value   06/24/2021 52   04/20/2021 48       (goal LDL is <100)   AST (U/L)   Date Value   05/31/2022 27     ALT (U/L)   Date Value   05/31/2022 36 (H)     BUN (mg/dL)   Date Value   06/02/2022 38 (H)     BP Readings from Last 3 Encounters:   06/03/22 109/69   05/26/22 117/67   05/20/22 121/70          (goal 120/80)    All Future Testing planned in [facility]  Lab Frequency Next Occurrence   Basic Metabolic Panel Once 76/75/5933   Saline lock IV Once 05/10/2022 Lumbar Epidural Steroid Injection/Caudal Once 11/10/2021   Microalbumin, Ur Once 06/26/2022   Lipid Panel Once 06/26/2022   Comprehensive Metabolic Panel Once 92/14/9742   Vitamin D 25 Hydroxy Once 05/26/2022   POCT Glucose 4 TIMES DAILY BEFORE MEALS & AT BEDTIME    HYPOGLYCEMIA TREATMENT: blood glucose less than 50 mg/dL and patient  ALERT and TOLERATING PO PRN    HYPOGLYCEMIA TREATMENT: blood glucose less than 70 mg/dL and patient ALERT and TOLERATING PO PRN    HYPOGLYCEMIA TREATMENT: blood glucose less than 70 mg/dL and patient NOT ALERT or NPO PRN    POCT Glucose PRN    HYPOGLYCEMIA TREATMENT: blood glucose less than 50 mg/dL and patient  ALERT and TOLERATING PO PRN    HYPOGLYCEMIA TREATMENT: blood glucose less than 70 mg/dL and patient ALERT and TOLERATING PO PRN    HYPOGLYCEMIA TREATMENT: blood glucose less than 70 mg/dL and patient NOT ALERT or NPO PRN    POCT Glucose PRN                Patient Active Problem List:     Cervical spondylosis     Type II or unspecified type diabetes mellitus without mention of complication, not stated as uncontrolled     Hypertension     Abnormal auditory perception     Eustachian tube dysfunction     Chronic serous otitis media     Nasal polyps     Nasal congestion     Osteoarthritis of left knee     Osteoarthritis of right knee     DIEGO (nonalcoholic steatohepatitis)     Vitamin D deficiency     Iron deficiency anemia     Dyslipidemia     Diabetes mellitus type 2, uncontrolled (HCC)     Left hip pain     Trochanteric bursitis     Fatigue     Daytime somnolence     Snoring     Chronic left shoulder pain     Restless legs syndrome     Generalized anxiety disorder     Primary osteoarthritis, left shoulder     Tendinopathy of left shoulder     Adhesive capsulitis of left shoulder     Lumbar radiculopathy     Back pain

## 2022-06-03 NOTE — PROGRESS NOTES
[facility] Internal Medicine    CONSULTATION / HISTORY AND PHYSICAL EXAMINATION            Date:   6/3/2022  Patient name:  Toñito Cheney  Date of admission:  5/31/2022  5:16 AM  MRN:   [id number]  Account:  [de-identified]  YOB: 1960  PCP:    Warden Randy MD  Room:   2064/2064-01  Code Status:    Full Code    Physician Requesting Consult: Vane Arevalo MD    Reason for Consult:  medical management    Chief Complaint:     Chief Complaint   Patient presents with    Back Pain       History Obtained From:     Patient medical record nursing staff    History of Present Illness:   Patient has past medical history multiple medical problems, morbid obesity, diabetes, history of TIA, CKD, chronic back pain. Patient follows with pain management, orthopedic surgeon. She has history of epidural shots in her back  Admitted with complaints of back pain which started today morning, yesterday she was bending frequently, she was collecting some old close to donate, and likely that has precipitated her back pain.   Back pain is radiating to left buttock and left leg, no difficulty in passing urine, stools  No injury, trauma, fall  In emergency room, patient was given multiple pain shots, with minimal benefit, patient is actively crying in the pain, only comfortable position is leaning on the bed  6/1  Patient still complaining of lower back pain  Morphine is not working    6/2  Patient still complaining of severe back pain, could not get MRI done due to pain and anxiety  Evaluated by Ortho    6/3  Patient feels that pain is slightly better  On scheduled pain medication  Muscle relaxant    Past Medical History:     Past Medical History:   Diagnosis Date    Cervical spondylosis 4/2009    c-4 c-5, c-5 c-6, c-6 c-7    Generalized anxiety disorder 12/8/2020    Hyperlipidemia     Hypertension     Lumbar radiculopathy     DIEGO (nonalcoholic steatohepatitis) 10/12/2014    Obesity     Osteoarthritis of left knee 2014    Restless legs syndrome     Type II or unspecified type diabetes mellitus without mention of complication, not stated as uncontrolled         Past Surgical History:     Past Surgical History:   Procedure Laterality Date     SECTION      COLONOSCOPY  2012    [facility]    HYSTERECTOMY, TOTAL ABDOMINAL  2008    Polymenorrhagia    SHOULDER SURGERY Left 2021     SHOULDER MANIPULATION WITH PRE OP INTERSCALENE BLOCK     SHOULDER SURGERY Left 2021    SHOULDER MANIPULATION WITH PRE OP INTERSCALENE BLOCK and intraop injection performed by Lidia Monique DO at [address] [place] [address] Avenue  2012    [facility]        Medications Prior to Admission:     Prior to Admission medications    Medication Sig Start Date End Date Taking? Authorizing Provider   rosuvastatin (CRESTOR) 20 MG tablet TAKE ONE TABLET BY MOUTH NIGHTLY 22   Lennox Noa, MD   celecoxib (CELEBREX) 100 MG capsule Take 1 capsule by mouth 2 times daily 4/15/22   Kenney Wright MD   ASPIRIN LOW DOSE 81 MG EC tablet TAKE 1 TABLET BY MOUTH ONE TIME A DAY 22   James Torres MD   gabapentin (NEURONTIN) 100 MG capsule Take one cap at 4 pm and 8 pm 4/6/22 10/6/22  Mara Zuniga MD   tiZANidine (ZANAFLEX) 2 MG tablet Take one tab at 4 pm and 8 pm as needed for muscle spasms 22   Mara Zuniga MD   gabapentin (NEURONTIN) 300 MG capsule Take 1 capsule by mouth 3 times daily for 30 days.  3/31/22 4/30/22  Melinda Eden MD   lisinopril (PRINIVIL;ZESTRIL) 40 MG tablet Take 1 tablet by mouth daily 3/18/22   Deonte Edouard MD   glipiZIDE (GLUCOTROL) 10 MG tablet Take 1 tab bid 22   Kenney Wright MD   busPIRone (BUSPAR) 10 MG tablet Take 1 tab three times daily as needed 22   Kenney Wright MD   vitamin D (ERGOCALCIFEROL) 1.25 MG (67305 UT) CAPS capsule TAKE ONE CAPSULE BY MOUTH ONCE A WEEK (EVERY 7 DAYS) 1/11/22   Maria Esther Ferguson MD   TRULICITY 1.5 LD[facility] INJECT THE CONTENTS OF ONE SYRINGE UNDER THE SKIN ONCE WEEKLY 1/3/22   Marcelino Ortega MD   chlorthalidone (HYGROTON) 25 MG tablet TAKE 1 TABLET BY MOUTH ONE TIME A DAY 11/16/21   Sangeeta Gonzalez MD   SYNJARDY 5-1000 MG TABS TAKE 1 TABLET BY MOUTH 2 TIMES A DAY 11/1/21   Mesha River MD   omeprazole (PRILOSEC) 20 MG delayed release capsule Take 1 capsule by mouth 2 times daily 7/26/21   Mesha River MD   rOPINIRole (REQUIP) 2 MG tablet TAKE 2 TABLETS BY MOUTH NIGHTLY 7/26/21   Mesha River MD   albuterol sulfate HFA (PROVENTIL HFA) 108 (90 Base) MCG/ACT inhaler Inhale 2 puffs into the lungs every 4 hours as needed for Wheezing 4/20/21 4/20/22  Mesha River MD   repaglinide (PRANDIN) 1 MG tablet TAKE 1 TABLET BY MOUTH 3 TIMES A DAY BEFORE MEALS 12/8/20   Mesha River MD   blood glucose test strips (PRODIGY NO CODING BLOOD GLUC) strip Use to test once daily and as needed as directed by provider 11/12/20   Laura Cervantes MD   diclofenac sodium (VOLTAREN) 1 % GEL Apply 2 g topically 2 times daily  Patient taking differently: Apply 2 g topically 2 times daily Prn pain 11/12/20   Laura Cervantes MD   acetaminophen (ACETAMINOPHEN EXTRA STRENGTH) 500 MG tablet Take 1,000 mg by mouth daily as needed for Pain    Historical Provider, MD   bimatoprost (LUMIGAN) 0.01 % SOLN ophthalmic drops Place 1 drop into both eyes nightly 4/26/18   Historical Provider, MD   PRODIGY LANCETS 28G MISC 1 Bottle by Does not apply route three times daily 4/26/19   Mesha River MD   Blood Glucose Monitoring Suppl (PRODIGY AUTOCODE BLOOD GLUCOSE) w/Device KIT 1 Device by Does not apply route 3 times daily 4/26/19   Mesha River MD        Allergies:     Codeine    Social History:     Tobacco:    reports that she has never smoked. She has never used smokeless tobacco.  Alcohol:      reports current alcohol use.   Drug Use:  reports no history of drug use.    Family History:     Family History   Problem Relation Age of Onset    Heart Attack Mother     Diabetes Mother     Diabetes Father     Heart Attack Father        Review of Systems:     Positive and Negative as described in HPI. CONSTITUTIONAL:  negative for fevers, chills, sweats, fatigue, weight loss  HEENT:  negative for vision, hearing changes, runny nose, throat pain  RESPIRATORY:  negative for shortness of breath, cough, congestion, wheezing. CARDIOVASCULAR:  negative for chest pain, palpitations. GASTROINTESTINAL:  negative for nausea, vomiting, diarrhea, constipation, change in bowel habits, abdominal pain   GENITOURINARY:  negative for difficulty of urination, burning with urination, frequency   INTEGUMENT:  negative for rash, skin lesions, easy bruising   HEMATOLOGIC/LYMPHATIC:  negative for swelling/edema   ALLERGIC/IMMUNOLOGIC:  negative for urticaria , itching  ENDOCRINE:  negative increase in drinking, increase in urination, hot or cold intolerance  MUSCULOSKELETAL: Lower back pain  NEUROLOGICAL:  negative for headaches, dizziness, lightheadedness, numbness, pain, tingling extremities  BEHAVIOR/PSYCH:      Physical Exam:     /78   Pulse 90   Temp 97.7 °F (36.5 °C)   Resp 19   SpO2 98%   Temp (24hrs), Av.9 °F (36.6 °C), Min:97.7 °F (36.5 °C), Max:98.1 °F (36.7 °C)    Recent Labs     22  0609 22  0919 22  1149   POCGLU 266* 225* 295* 256*     No intake or output data in the 24 hours ending 22 1457    General Appearance:  alert, well appearing, and in no acute distress, central obesity present  Mental status: oriented to person, place, and time with normal affect  Head:  normocephalic, atraumatic.   Eye: no icterus, redness, pupils equal and reactive, extraocular eye movements intact, conjunctiva clear  Ear: normal external ear, no discharge, hearing intact  Nose:  no drainage noted  Mouth: mucous membranes moist  Neck: supple, no carotid bruits, thyroid not palpable  Lungs: Bilateral equal air entry, clear to ausculation, no wheezing, rales or rhonchi, normal effort  Cardiovascular: normal rate, regular rhythm, no murmur, gallop, rub. Abdomen: Soft, nontender, nondistended, normal bowel sounds, no hepatomegaly or splenomegaly  Neurologic: Power in left leg is 5/5, patient is not moving left leg because of pain  Skin: No gross lesions, rashes, bruising or bleeding on exposed skin area  Extremities: Tenderness, and left buttock area  Psych: Investigations:      Laboratory Testing:  Recent Results (from the past 24 hour(s))   POC Glucose Fingerstick    Collection Time: 06/02/22  4:15 PM   Result Value Ref Range    POC Glucose 210 (H) 65 - 105 mg/dL   POC Glucose Fingerstick    Collection Time: 06/02/22  8:06 PM   Result Value Ref Range    POC Glucose 266 (H) 65 - 105 mg/dL   POC Glucose Fingerstick    Collection Time: 06/03/22  6:09 AM   Result Value Ref Range    POC Glucose 225 (H) 65 - 105 mg/dL   POC Glucose Fingerstick    Collection Time: 06/03/22  9:19 AM   Result Value Ref Range    POC Glucose 295 (H) 65 - 105 mg/dL   POC Glucose Fingerstick    Collection Time: 06/03/22 11:49 AM   Result Value Ref Range    POC Glucose 256 (H) 65 - 105 mg/dL           Consultations:   IP CONSULT TO ORTHOPEDIC SURGERY  Assessment :      Primary Problem  Back pain    Active Hospital Problems    Diagnosis Date Noted    Back pain [M54.9] 05/31/2022     Priority: Medium    Diabetes mellitus type 2, uncontrolled ([address] [E11.65] 10/12/2014    Dyslipidemia [E78.5] 10/12/2014    DIEGO (nonalcoholic steatohepatitis) [K75.81] 10/12/2014    Hypertension [I10]    Principal Problem:    Back pain  Active Problems:    Hypertension    DIEGO (nonalcoholic steatohepatitis)    Dyslipidemia    Diabetes mellitus type 2, uncontrolled ([address]  Resolved Problems:    * No resolved hospital problems.  *        Plan:     Lower back pain with left-sided sciatica, patient had lumbar spine MRI back in April of this year, suggestive of DJD, there is disc problem at the level of L5  Starting patient on Medrol Dosepak, Flexeril, morphine for pain control  Diabetes, restarting home medication, sliding scale  Orthopedic surgery consult  Lovenox for DVT prophylaxis    6/1  Patient is on Medrol Dosepak, Flexeril, starting patient on Percocet  Awaiting input from orthopedics  6/2   Patient blood sugars going high due to Decadron  Starting patient on Lantus 10 unit  Putting patient on scheduled OxyContin extended release 10 mg twice a day with Percocet every 6 for breakthrough  Xanax 1 mg 30 minutes before the procedure  Patient does not like Flexeril, she feels that it is not helping  We will try with baclofen   will have MRI today    6/3  Patient underwent MRI, which is essentially the same  Awaiting placement to SNF  On scheduled pain medication    Fela Mccollum MD  6/3/2022  2:57 PM    Copy sent to Dr. Pooja Monae MD    Please note that this chart was generated using voice recognition Dragon dictation software. Although every effort was made to ensure the accuracy of this automated transcription, some errors in transcription may have occurred.

## 2022-06-03 NOTE — CARE COORDINATION
Carlin Loaiza [facility] out of network. Will follow up with Nicole and [employer] at [place].      Electronically signed by Arturo Blake on 6/3/22 at 4:17 PM EDT

## 2022-06-04 PROCEDURE — 6370000000 HC RX 637 (ALT 250 FOR IP): Performed by: INTERNAL MEDICINE

## 2022-06-04 PROCEDURE — 99232 SBSQ HOSP IP/OBS MODERATE 35: CPT | Performed by: INTERNAL MEDICINE

## 2022-06-04 PROCEDURE — 97530 THERAPEUTIC ACTIVITIES: CPT

## 2022-06-04 PROCEDURE — 97110 THERAPEUTIC EXERCISES: CPT

## 2022-06-04 PROCEDURE — G0378 HOSPITAL OBSERVATION PER HR: HCPCS

## 2022-06-04 PROCEDURE — 99024 POSTOP FOLLOW-UP VISIT: CPT | Performed by: ORTHOPAEDIC SURGERY

## 2022-06-04 PROCEDURE — 6360000002 HC RX W HCPCS: Performed by: INTERNAL MEDICINE

## 2022-06-04 PROCEDURE — 96372 THER/PROPH/DIAG INJ SC/IM: CPT

## 2022-06-04 RX ORDER — DOCUSATE SODIUM 100 MG/1
100 CAPSULE, LIQUID FILLED ORAL 2 TIMES DAILY
Status: DISCONTINUED | OUTPATIENT
Start: 2022-06-04 | End: 2022-06-10 | Stop reason: HOSPADM

## 2022-06-04 RX ADMIN — OXYCODONE HYDROCHLORIDE 10 MG: 10 TABLET, FILM COATED, EXTENDED RELEASE ORAL at 21:01

## 2022-06-04 RX ADMIN — CHLORTHALIDONE 25 MG: 25 TABLET ORAL at 08:57

## 2022-06-04 RX ADMIN — BACLOFEN 5 MG: 10 TABLET ORAL at 14:35

## 2022-06-04 RX ADMIN — INSULIN GLARGINE 10 UNITS: 100 INJECTION, SOLUTION SUBCUTANEOUS at 08:50

## 2022-06-04 RX ADMIN — DOCUSATE SODIUM 100 MG: 100 CAPSULE, LIQUID FILLED ORAL at 21:01

## 2022-06-04 RX ADMIN — BACLOFEN 5 MG: 10 TABLET ORAL at 08:45

## 2022-06-04 RX ADMIN — GABAPENTIN 300 MG: 300 CAPSULE ORAL at 08:45

## 2022-06-04 RX ADMIN — PANTOPRAZOLE SODIUM 40 MG: 40 TABLET, DELAYED RELEASE ORAL at 06:07

## 2022-06-04 RX ADMIN — LISINOPRIL 40 MG: 20 TABLET ORAL at 08:45

## 2022-06-04 RX ADMIN — ROSUVASTATIN CALCIUM 20 MG: 20 TABLET, FILM COATED ORAL at 21:05

## 2022-06-04 RX ADMIN — ROPINIROLE HYDROCHLORIDE 2 MG: 2 TABLET, FILM COATED ORAL at 21:01

## 2022-06-04 RX ADMIN — GABAPENTIN 300 MG: 300 CAPSULE ORAL at 14:35

## 2022-06-04 RX ADMIN — OXYCODONE HYDROCHLORIDE 10 MG: 10 TABLET, FILM COATED, EXTENDED RELEASE ORAL at 08:45

## 2022-06-04 RX ADMIN — ENOXAPARIN SODIUM 40 MG: 100 INJECTION SUBCUTANEOUS at 08:51

## 2022-06-04 RX ADMIN — LATANOPROST 1 DROP: 50 SOLUTION OPHTHALMIC at 21:01

## 2022-06-04 RX ADMIN — GABAPENTIN 300 MG: 300 CAPSULE ORAL at 21:01

## 2022-06-04 RX ADMIN — GLIPIZIDE 10 MG: 5 TABLET ORAL at 06:07

## 2022-06-04 RX ADMIN — BUSPIRONE HYDROCHLORIDE 10 MG: 10 TABLET ORAL at 08:45

## 2022-06-04 RX ADMIN — ASPIRIN 81 MG: 81 TABLET, COATED ORAL at 08:45

## 2022-06-04 RX ADMIN — INSULIN LISPRO 1 UNITS: 100 INJECTION, SOLUTION INTRAVENOUS; SUBCUTANEOUS at 20:57

## 2022-06-04 RX ADMIN — BUSPIRONE HYDROCHLORIDE 10 MG: 10 TABLET ORAL at 21:01

## 2022-06-04 RX ADMIN — INSULIN LISPRO 2 UNITS: 100 INJECTION, SOLUTION INTRAVENOUS; SUBCUTANEOUS at 12:22

## 2022-06-04 RX ADMIN — INSULIN LISPRO 2 UNITS: 100 INJECTION, SOLUTION INTRAVENOUS; SUBCUTANEOUS at 17:03

## 2022-06-04 RX ADMIN — INSULIN LISPRO 1 UNITS: 100 INJECTION, SOLUTION INTRAVENOUS; SUBCUTANEOUS at 08:51

## 2022-06-04 RX ADMIN — GLIPIZIDE 10 MG: 5 TABLET ORAL at 14:35

## 2022-06-04 RX ADMIN — BACLOFEN 5 MG: 10 TABLET ORAL at 21:01

## 2022-06-04 ASSESSMENT — PAIN SCALES - GENERAL
PAINLEVEL_OUTOF10: 10
PAINLEVEL_OUTOF10: 8
PAINLEVEL_OUTOF10: 7

## 2022-06-04 ASSESSMENT — PAIN DESCRIPTION - ORIENTATION: ORIENTATION: LOWER;LEFT

## 2022-06-04 ASSESSMENT — PAIN DESCRIPTION - LOCATION: LOCATION: BACK

## 2022-06-04 NOTE — FLOWSHEET NOTE
Patient is still unable to ambulate well or get into bed for very long--looks exhausted. Disappointed that her 1st choice for rehab is [facility]. Writer provided listening presence and prayer. 06/04/22 1234   Encounter Summary   Encounter Overview/Reason  Spiritual/Emotional Needs   Service Provided For: Patient   Referral/Consult From: Manish   Last Encounter  06/04/22   Complexity of Encounter Moderate   Spiritual/Emotional needs   Type Spiritual Support   Assessment/Intervention/Outcome   Assessment Powerlessness  (Frustrated)   Intervention Active listening;Discussed illness injury and its impact; Explored/Affirmed feelings, thoughts, concerns;Prayer (assurance of)/Fulda;Nurtured Hope;Sustaining Presence/Ministry of presence   Outcome Engaged in conversation;Expressed feelings, needs, and concerns;Expressed Gratitude;Receptive

## 2022-06-04 NOTE — PROGRESS NOTES
While giving nighttime medications, writer noticed a pill on pt tray table. Writer asked pt if she knew what medication it was. Pt told writer that her  had brought it from home for her. She stated that she normally takes 4mg of Requip at home and they have only been giving her 2mg while in the hospital. Writer explained the importance of only taking medications ordered by the hospital doctors while in the hospital. Amy Campos had spoken with both pt and her  regarding bringing in home medications and the hospital policy against this as well. Pt and spouse verbalized understanding.

## 2022-06-04 NOTE — PROGRESS NOTES
Formerly [facility], later [facility]    CONSULTATION / HISTORY AND PHYSICAL EXAMINATION            Date:   6/4/2022  Patient name:  Harley Estrada  Date of admission:  5/31/2022  5:16 AM  MRN:   [id number]  Account:  [de-identified]  YOB: 1960  PCP:    Mike Appiah MD  Room:   2064/2064-01  Code Status:    Full Code    Physician Requesting Consult: Fidel Chow MD    Reason for Consult:  medical management    Chief Complaint:     Chief Complaint   Patient presents with    Back Pain       History Obtained From:     Patient medical record nursing staff    History of Present Illness:   Patient has past medical history multiple medical problems, morbid obesity, diabetes, history of TIA, CKD, chronic back pain. Patient follows with pain management, orthopedic surgeon. She has history of epidural shots in her back  Admitted with complaints of back pain which started today morning, yesterday she was bending frequently, she was collecting some old close to donate, and likely that has precipitated her back pain.   Back pain is radiating to left buttock and left leg, no difficulty in passing urine, stools  No injury, trauma, fall  In emergency room, patient was given multiple pain shots, with minimal benefit, patient is actively crying in the pain, only comfortable position is leaning on the bed  6/1  Patient still complaining of lower back pain  Morphine is not working    6/2  Patient still complaining of severe back pain, could not get MRI done due to pain and anxiety  Evaluated by Ortho    6/3  Patient feels that pain is slightly better  On scheduled pain medication  Muscle relaxant    Past Medical History:     Past Medical History:   Diagnosis Date    Cervical spondylosis 4/2009    c-4 c-5, c-5 c-6, c-6 c-7    Generalized anxiety disorder 12/8/2020    Hyperlipidemia     Hypertension     Lumbar radiculopathy     DIEGO (nonalcoholic steatohepatitis) 10/12/2014    Obesity     Osteoarthritis of left knee 2014    Restless legs syndrome     Type II or unspecified type diabetes mellitus without mention of complication, not stated as uncontrolled         Past Surgical History:     Past Surgical History:   Procedure Laterality Date     SECTION      COLONOSCOPY  2012    [facility]    HYSTERECTOMY, TOTAL ABDOMINAL  2008    Polymenorrhagia    SHOULDER SURGERY Left 2021     SHOULDER MANIPULATION WITH PRE OP INTERSCALENE BLOCK     SHOULDER SURGERY Left 2021    SHOULDER MANIPULATION WITH PRE OP INTERSCALENE BLOCK and intraop injection performed by Reba Castellano DO at [address] [place] [address]  2012    [facility]        Medications Prior to Admission:     Prior to Admission medications    Medication Sig Start Date End Date Taking? Authorizing Provider   rosuvastatin (CRESTOR) 20 MG tablet TAKE ONE TABLET BY MOUTH NIGHTLY 22   Juanita Black MD   celecoxib (CELEBREX) 100 MG capsule Take 1 capsule by mouth 2 times daily 4/15/22   Inga Quintana MD   ASPIRIN LOW DOSE 81 MG EC tablet TAKE 1 TABLET BY MOUTH ONE TIME A DAY 22   Jocelyn Mccray MD   gabapentin (NEURONTIN) 100 MG capsule Take one cap at 4 pm and 8 pm 4/6/22 10/6/22  Oz Cisneros MD   tiZANidine (ZANAFLEX) 2 MG tablet Take one tab at 4 pm and 8 pm as needed for muscle spasms 22   Oz Cisneros MD   gabapentin (NEURONTIN) 300 MG capsule Take 1 capsule by mouth 3 times daily for 30 days.  3/31/22 4/30/22  Rebel Patiño MD   lisinopril (PRINIVIL;ZESTRIL) 40 MG tablet Take 1 tablet by mouth daily 3/18/22   Noel Scherer MD   glipiZIDE (GLUCOTROL) 10 MG tablet Take 1 tab bid 22   Inga Quintana MD   busPIRone (BUSPAR) 10 MG tablet Take 1 tab three times daily as needed 22   Inga Quintana MD   vitamin D (ERGOCALCIFEROL) 1.25 MG (23426 UT) CAPS capsule TAKE ONE CAPSULE BY MOUTH ONCE A WEEK (EVERY 7 DAYS) 1/11/22   Patt Ruggiero MD   TRULICITY 1.5 IN/0.8WF [facility] INJECT THE CONTENTS OF ONE SYRINGE UNDER THE SKIN ONCE WEEKLY 1/3/22   Ru Loving MD   chlorthalidone (HYGROTON) 25 MG tablet TAKE 1 TABLET BY MOUTH ONE TIME A DAY 11/16/21   Sangeeta Gonzalez MD   SYNJARDY 5-1000 MG TABS TAKE 1 TABLET BY MOUTH 2 TIMES A DAY 11/1/21   Jammie Ramos MD   omeprazole (PRILOSEC) 20 MG delayed release capsule Take 1 capsule by mouth 2 times daily 7/26/21   Jammie Ramos MD   rOPINIRole (REQUIP) 2 MG tablet TAKE 2 TABLETS BY MOUTH NIGHTLY 7/26/21   Jammie Ramos MD   albuterol sulfate HFA (PROVENTIL HFA) 108 (90 Base) MCG/ACT inhaler Inhale 2 puffs into the lungs every 4 hours as needed for Wheezing 4/20/21 4/20/22  Jammie Ramos MD   repaglinide (PRANDIN) 1 MG tablet TAKE 1 TABLET BY MOUTH 3 TIMES A DAY BEFORE MEALS 12/8/20   Jammie Ramos MD   blood glucose test strips (PRODIGY NO CODING BLOOD GLUC) strip Use to test once daily and as needed as directed by provider 11/12/20   Dalila Carrero MD   diclofenac sodium (VOLTAREN) 1 % GEL Apply 2 g topically 2 times daily  Patient taking differently: Apply 2 g topically 2 times daily Prn pain 11/12/20   Dalila Carrero MD   acetaminophen (ACETAMINOPHEN EXTRA STRENGTH) 500 MG tablet Take 1,000 mg by mouth daily as needed for Pain    Historical Provider, MD   bimatoprost (LUMIGAN) 0.01 % SOLN ophthalmic drops Place 1 drop into both eyes nightly 4/26/18   Historical Provider, MD   PRODIGY LANCETS 28G MISC 1 Bottle by Does not apply route three times daily 4/26/19   Jammie Ramos MD   Blood Glucose Monitoring Suppl (PRODIGY AUTOCODE BLOOD GLUCOSE) w/Device KIT 1 Device by Does not apply route 3 times daily 4/26/19   Jammie Ramos MD        Allergies:     Codeine    Social History:     Tobacco:    reports that she has never smoked. She has never used smokeless tobacco.  Alcohol:      reports current alcohol use.   Drug Use:  reports no history of drug use.    Family History:     Family History   Problem Relation Age of Onset    Heart Attack Mother     Diabetes Mother     Diabetes Father     Heart Attack Father        Review of Systems:     Positive and Negative as described in HPI. CONSTITUTIONAL:  negative for fevers, chills, sweats, fatigue, weight loss  HEENT:  negative for vision, hearing changes, runny nose, throat pain  RESPIRATORY:  negative for shortness of breath, cough, congestion, wheezing. CARDIOVASCULAR:  negative for chest pain, palpitations. GASTROINTESTINAL:  negative for nausea, vomiting, diarrhea, constipation, change in bowel habits, abdominal pain   GENITOURINARY:  negative for difficulty of urination, burning with urination, frequency   INTEGUMENT:  negative for rash, skin lesions, easy bruising   HEMATOLOGIC/LYMPHATIC:  negative for swelling/edema   ALLERGIC/IMMUNOLOGIC:  negative for urticaria , itching  ENDOCRINE:  negative increase in drinking, increase in urination, hot or cold intolerance  MUSCULOSKELETAL: Lower back pain  NEUROLOGICAL:  negative for headaches, dizziness, lightheadedness, numbness, pain, tingling extremities  BEHAVIOR/PSYCH:      Physical Exam:     /64   Pulse 97   Temp 98.1 °F (36.7 °C)   Resp 17   SpO2 100%   Temp (24hrs), Av °F (36.7 °C), Min:97.5 °F (36.4 °C), Max:98.2 °F (36.8 °C)    Recent Labs     22  2006 22  0609 22  0919 22  1149   POCGLU 266* 225* 295* 256*       Intake/Output Summary (Last 24 hours) at 2022 1550  Last data filed at 2022 5951  Gross per 24 hour   Intake --   Output 100 ml   Net -100 ml       General Appearance:  alert, well appearing, and in no acute distress, central obesity present  Mental status: oriented to person, place, and time with normal affect  Head:  normocephalic, atraumatic.   Eye: no icterus, redness, pupils equal and reactive, extraocular eye movements intact, conjunctiva clear  Ear: normal external ear, no discharge, hearing intact  Nose:  no drainage noted  Mouth: mucous membranes moist  Neck: supple, no carotid bruits, thyroid not palpable  Lungs: Bilateral equal air entry, clear to ausculation, no wheezing, rales or rhonchi, normal effort  Cardiovascular: normal rate, regular rhythm, no murmur, gallop, rub. Abdomen: Soft, nontender, nondistended, normal bowel sounds, no hepatomegaly or splenomegaly  Neurologic: Power in left leg is 5/5, patient is not moving left leg because of pain  Skin: No gross lesions, rashes, bruising or bleeding on exposed skin area  Extremities: Tenderness, and left buttock area  Psych: Investigations:      Laboratory Testing:  No results found for this or any previous visit (from the past 24 hour(s)). Consultations:   IP CONSULT TO ORTHOPEDIC SURGERY  Assessment :      Primary Problem  Back pain    Active Hospital Problems    Diagnosis Date Noted    Back pain [M54.9] 05/31/2022     Priority: Medium    Diabetes mellitus type 2, uncontrolled ([address] [E11.65] 10/12/2014    Dyslipidemia [E78.5] 10/12/2014    DIEGO (nonalcoholic steatohepatitis) [K75.81] 10/12/2014    Hypertension [I10]    Principal Problem:    Back pain  Active Problems:    Hypertension    DIEGO (nonalcoholic steatohepatitis)    Dyslipidemia    Diabetes mellitus type 2, uncontrolled ([address]  Resolved Problems:    * No resolved hospital problems.  *        Plan:     Lower back pain with left-sided sciatica, patient had lumbar spine MRI back in April of this year, suggestive of DJD, there is disc problem at the level of L5  Starting patient on Medrol Dosepak, Flexeril, morphine for pain control  Diabetes, restarting home medication, sliding scale  Orthopedic surgery consult  Lovenox for DVT prophylaxis    6/1  Patient is on Medrol Dosepak, Flexeril, starting patient on Percocet  Awaiting input from orthopedics  6/2   Patient blood sugars going high due to Decadron  Starting patient on Lantus 10 unit  Putting patient on scheduled OxyContin extended release 10 mg twice a day with Percocet every 6 for breakthrough  Xanax 1 mg 30 minutes before the procedure  Patient does not like Flexeril, she feels that it is not helping  We will try with baclofen   will have MRI today    6/3  Patient underwent MRI, which is essentially the same  Awaiting placement to SNF  On scheduled pain medication    6/4  Patient pain is controlled  Has not passed stools in last few days,  Has good bowel sounds  Starting patient on Colace  Awaiting placement    Navdeep Walsh MD  6/4/2022  3:50 PM    Copy sent to Dr. Jammie Ramos MD    Please note that this chart was generated using voice recognition Dragon dictation software. Although every effort was made to ensure the accuracy of this automated transcription, some errors in transcription may have occurred.

## 2022-06-04 NOTE — PROGRESS NOTES
Physical Therapy  Facility/Department: [facility] MED SURG  Daily Treatment Note  NAME: Farhad Bishop  : 1960  MRN: [id number]    Date of Service: 2022    Discharge Recommendations:  Home with assist PRN,Patient would benefit from continued therapy after discharge   PT Equipment Recommendations  Equipment Needed: Yes  Jerome Bal: Rolling    Patient Diagnosis(es): The primary encounter diagnosis was Sciatica of left side. A diagnosis of Low back pain with sciatica, sciatica laterality unspecified, unspecified back pain laterality, unspecified chronicity was also pertinent to this visit. Assessment   Activity Tolerance: Patient limited by pain  Equipment Needed: Yes     Plan    Plan  Plan: 1 time a day 7 days a week     Restrictions  Restrictions/Precautions  Restrictions/Precautions: General Precautions  Position Activity Restriction  Other position/activity restrictions: back pain limiting mobility     Subjective    Subjective  Subjective: Pt positioned seated in bedside chair facing bed and resting head and B UE on bed with pillow x1. LEELA Olson ok's tx and pt is agreeablt to tx while remaining seated in chair.    Orientation  Overall Orientation Status: Within Normal Limits  Cognition  Overall Cognitive Status: WNL     Objective   Vitals   10/10 low back pain  Bed Mobility Training  Bed Mobility Training: No (Pt unable to tolerate bed mobility at this time. )  Balance  Sitting: With support  Transfer Training  Transfer Training: No (Pt declining d/t dizziness . )  Gait Training  Gait Training: Yes     PT Exercises  Exercise Treatment: ISO quads in sitting 3sec hold x10   A/AROM Exercises: Seated B LE ankle PF/DF, LAQ x15-20 , alt. hip flex   Resistive Exercises: HS curls with orange therand x9 bilat              Goals  Short Term Goals  Time Frame for Short term goals: 1-2 visits  Short term goal 1: gait with RW mod-I x 50-75ft to ambulate safely within home  Short term goal 2: negotiate flight of stairs with rail and SBA to safely enter home    Education  Patient Education  Education Given To: Patient  Education Provided: Energy Conservation;Transfer Training;Family Education; Fall Prevention Strategies  Education Provided Comments: importance of B TKE for mobiltiy, Energy conservation techniques, and benefits of continued PT.    Education Method: Demonstration;Verbal  Barriers to Learning: None  Education Outcome: Verbalized understanding    Therapy Time   Individual Concurrent Group Co-treatment   Time In 1530         Time Out 1555         Minutes [address] [facility],[address], [place] Unknown if ever smoked

## 2022-06-05 ENCOUNTER — APPOINTMENT (OUTPATIENT)
Dept: GENERAL RADIOLOGY | Age: 62
DRG: 551 | End: 2022-06-05
Payer: COMMERCIAL

## 2022-06-05 LAB
-: ABNORMAL
ABSOLUTE EOS #: 0.2 K/UL (ref 0–0.4)
ABSOLUTE LYMPH #: 2.6 K/UL (ref 1–4.8)
ABSOLUTE MONO #: 1.7 K/UL (ref 0.1–1.3)
ANION GAP SERPL CALCULATED.3IONS-SCNC: 16 MMOL/L (ref 9–17)
ANION GAP SERPL CALCULATED.3IONS-SCNC: 18 MMOL/L (ref 9–17)
BACTERIA: ABNORMAL
BASOPHILS # BLD: 0 % (ref 0–2)
BASOPHILS ABSOLUTE: 0 K/UL (ref 0–0.2)
BILIRUBIN URINE: NEGATIVE
BUN BLDV-MCNC: 87 MG/DL (ref 8–23)
BUN BLDV-MCNC: 91 MG/DL (ref 8–23)
CALCIUM SERPL-MCNC: 8.9 MG/DL (ref 8.6–10.4)
CALCIUM SERPL-MCNC: 8.9 MG/DL (ref 8.6–10.4)
CARBOXYHEMOGLOBIN: 1.5 % (ref 0–5)
CHLORIDE BLD-SCNC: 97 MMOL/L (ref 98–107)
CHLORIDE BLD-SCNC: 98 MMOL/L (ref 98–107)
CHLORIDE, UR: 20 MMOL/L
CO2: 17 MMOL/L (ref 20–31)
CO2: 21 MMOL/L (ref 20–31)
COLOR: YELLOW
CREAT SERPL-MCNC: 3.08 MG/DL (ref 0.5–0.9)
CREAT SERPL-MCNC: 3.53 MG/DL (ref 0.5–0.9)
CREATININE URINE: 145.5 MG/DL (ref 28–217)
EOSINOPHIL,URINE: NORMAL
EOSINOPHILS RELATIVE PERCENT: 1 % (ref 0–4)
EPITHELIAL CELLS UA: ABNORMAL /HPF
GFR AFRICAN AMERICAN: 16 ML/MIN
GFR AFRICAN AMERICAN: 19 ML/MIN
GFR NON-AFRICAN AMERICAN: 13 ML/MIN
GFR NON-AFRICAN AMERICAN: 15 ML/MIN
GFR SERPL CREATININE-BSD FRML MDRD: ABNORMAL ML/MIN/{1.73_M2}
GFR SERPL CREATININE-BSD FRML MDRD: ABNORMAL ML/MIN/{1.73_M2}
GLUCOSE BLD-MCNC: 265 MG/DL (ref 70–99)
GLUCOSE BLD-MCNC: 303 MG/DL (ref 70–99)
GLUCOSE URINE: NEGATIVE
HCO3 VENOUS: 18.3 MMOL/L (ref 24–30)
HCT VFR BLD CALC: 40.7 % (ref 36–46)
HEMOGLOBIN: 13.1 G/DL (ref 12–16)
KETONES, URINE: NEGATIVE
LEUKOCYTE ESTERASE, URINE: NEGATIVE
LYMPHOCYTES # BLD: 17 % (ref 24–44)
MCH RBC QN AUTO: 28.6 PG (ref 26–34)
MCHC RBC AUTO-ENTMCNC: 32.3 G/DL (ref 31–37)
MCV RBC AUTO: 88.5 FL (ref 80–100)
METHEMOGLOBIN: 0.9 % (ref 0–1.9)
MONOCYTES # BLD: 11 % (ref 1–7)
NEGATIVE BASE EXCESS, VEN: 7.7 MMOL/L (ref 0–2)
NITRITE, URINE: NEGATIVE
O2 SAT, VEN: 94.4 % (ref 60–85)
OSMOLALITY URINE: 335 MOSM/KG (ref 80–1300)
PATIENT TEMP: 37
PCO2, VEN: 35.2 (ref 39–55)
PDW BLD-RTO: 13.6 % (ref 11.5–14.9)
PH UA: 5 (ref 5–8)
PH VENOUS: 7.33 (ref 7.32–7.42)
PLATELET # BLD: 374 K/UL (ref 150–450)
PMV BLD AUTO: 8.3 FL (ref 6–12)
PO2, VEN: 88.6 (ref 30–50)
POTASSIUM SERPL-SCNC: 5 MMOL/L (ref 3.7–5.3)
POTASSIUM SERPL-SCNC: 5.3 MMOL/L (ref 3.7–5.3)
POTASSIUM, UR: 39.4 MMOL/L
PROTEIN UA: ABNORMAL
RBC # BLD: 4.6 M/UL (ref 4–5.2)
RBC UA: ABNORMAL /HPF
SEG NEUTROPHILS: 71 % (ref 36–66)
SEGMENTED NEUTROPHILS ABSOLUTE COUNT: 11.2 K/UL (ref 1.3–9.1)
SODIUM BLD-SCNC: 132 MMOL/L (ref 135–144)
SODIUM BLD-SCNC: 135 MMOL/L (ref 135–144)
SODIUM,UR: 27 MMOL/L
SPECIFIC GRAVITY UA: 1.02 (ref 1–1.03)
TEXT FOR RESPIRATORY: ABNORMAL
TOTAL PROTEIN, URINE: 18 MG/DL
TURBIDITY: CLEAR
URINE HGB: ABNORMAL
URINE TOTAL PROTEIN CREATININE RATIO: 0.12 (ref 0–0.2)
UROBILINOGEN, URINE: NORMAL
WBC # BLD: 15.6 K/UL (ref 3.5–11)
WBC UA: ABNORMAL /HPF

## 2022-06-05 PROCEDURE — 82800 BLOOD PH: CPT

## 2022-06-05 PROCEDURE — G0378 HOSPITAL OBSERVATION PER HR: HCPCS

## 2022-06-05 PROCEDURE — 2580000003 HC RX 258: Performed by: INTERNAL MEDICINE

## 2022-06-05 PROCEDURE — 6370000000 HC RX 637 (ALT 250 FOR IP): Performed by: INTERNAL MEDICINE

## 2022-06-05 PROCEDURE — 83935 ASSAY OF URINE OSMOLALITY: CPT

## 2022-06-05 PROCEDURE — 96372 THER/PROPH/DIAG INJ SC/IM: CPT

## 2022-06-05 PROCEDURE — 84300 ASSAY OF URINE SODIUM: CPT

## 2022-06-05 PROCEDURE — 6360000002 HC RX W HCPCS: Performed by: INTERNAL MEDICINE

## 2022-06-05 PROCEDURE — 80048 BASIC METABOLIC PNL TOTAL CA: CPT

## 2022-06-05 PROCEDURE — 85025 COMPLETE CBC W/AUTO DIFF WBC: CPT

## 2022-06-05 PROCEDURE — 84133 ASSAY OF URINE POTASSIUM: CPT

## 2022-06-05 PROCEDURE — 51798 US URINE CAPACITY MEASURE: CPT

## 2022-06-05 PROCEDURE — 87205 SMEAR GRAM STAIN: CPT

## 2022-06-05 PROCEDURE — 82436 ASSAY OF URINE CHLORIDE: CPT

## 2022-06-05 PROCEDURE — 82570 ASSAY OF URINE CREATININE: CPT

## 2022-06-05 PROCEDURE — 51702 INSERT TEMP BLADDER CATH: CPT

## 2022-06-05 PROCEDURE — 94761 N-INVAS EAR/PLS OXIMETRY MLT: CPT

## 2022-06-05 PROCEDURE — 99233 SBSQ HOSP IP/OBS HIGH 50: CPT | Performed by: INTERNAL MEDICINE

## 2022-06-05 PROCEDURE — 84156 ASSAY OF PROTEIN URINE: CPT

## 2022-06-05 PROCEDURE — 36415 COLL VENOUS BLD VENIPUNCTURE: CPT

## 2022-06-05 PROCEDURE — 82805 BLOOD GASES W/O2 SATURATION: CPT

## 2022-06-05 PROCEDURE — 51701 INSERT BLADDER CATHETER: CPT

## 2022-06-05 PROCEDURE — 81001 URINALYSIS AUTO W/SCOPE: CPT

## 2022-06-05 PROCEDURE — 71045 X-RAY EXAM CHEST 1 VIEW: CPT

## 2022-06-05 RX ORDER — 0.9 % SODIUM CHLORIDE 0.9 %
500 INTRAVENOUS SOLUTION INTRAVENOUS ONCE
Status: COMPLETED | OUTPATIENT
Start: 2022-06-05 | End: 2022-06-06

## 2022-06-05 RX ORDER — HEPARIN SODIUM 5000 [USP'U]/ML
5000 INJECTION, SOLUTION INTRAVENOUS; SUBCUTANEOUS EVERY 8 HOURS SCHEDULED
Status: DISCONTINUED | OUTPATIENT
Start: 2022-06-05 | End: 2022-06-10 | Stop reason: HOSPADM

## 2022-06-05 RX ORDER — SODIUM CHLORIDE 9 MG/ML
INJECTION, SOLUTION INTRAVENOUS CONTINUOUS
Status: DISCONTINUED | OUTPATIENT
Start: 2022-06-05 | End: 2022-06-08

## 2022-06-05 RX ADMIN — ENOXAPARIN SODIUM 40 MG: 100 INJECTION SUBCUTANEOUS at 08:48

## 2022-06-05 RX ADMIN — HEPARIN SODIUM 5000 UNITS: 5000 INJECTION INTRAVENOUS; SUBCUTANEOUS at 22:23

## 2022-06-05 RX ADMIN — GLIPIZIDE 10 MG: 5 TABLET ORAL at 05:40

## 2022-06-05 RX ADMIN — SODIUM CHLORIDE 500 ML: 9 INJECTION, SOLUTION INTRAVENOUS at 23:17

## 2022-06-05 RX ADMIN — LATANOPROST 1 DROP: 50 SOLUTION OPHTHALMIC at 21:52

## 2022-06-05 RX ADMIN — INSULIN LISPRO 3 UNITS: 100 INJECTION, SOLUTION INTRAVENOUS; SUBCUTANEOUS at 13:56

## 2022-06-05 RX ADMIN — SODIUM CHLORIDE: 9 INJECTION, SOLUTION INTRAVENOUS at 11:03

## 2022-06-05 RX ADMIN — HEPARIN SODIUM 5000 UNITS: 5000 INJECTION INTRAVENOUS; SUBCUTANEOUS at 13:55

## 2022-06-05 RX ADMIN — SODIUM CHLORIDE: 9 INJECTION, SOLUTION INTRAVENOUS at 19:39

## 2022-06-05 RX ADMIN — PANTOPRAZOLE SODIUM 40 MG: 40 TABLET, DELAYED RELEASE ORAL at 05:40

## 2022-06-05 ASSESSMENT — PAIN SCALES - GENERAL: PAINLEVEL_OUTOF10: 0

## 2022-06-05 NOTE — PROGRESS NOTES
Surgical Progress Note    POD:      Patient doing poorly  Vitals:    22   BP: (!) 116/98   Pulse: (!) 121   Resp: 20   Temp: 99.5 °F (37.5 °C)   SpO2: 93%      Temp (24hrs), Av.7 °F (37.1 °C), Min:98.1 °F (36.7 °C), Max:99.5 °F (37.5 °C)       Pain Control unable to assess  No unusual nausea    Exam: sitting with head on bed has been doing same since admission  icreased bun cr  Decreased loc        Lungs:  No respiratory distress    Labs reviewed:  Labs:  WBC/Hgb/Hct/Plts:  15.6/13.1/40.7/374 (944)  BUN/Cr/glu/ALT/AST/amyl/lip:  87/3.08/--/--/--/--/-- (486)     Na/K/Cl/CO2:  132/5.3/97/17 (944)    I/O last 3 completed shifts:  In: -   Out: 100 [Urine:100]    Assessment:    Patient Active Problem List   Diagnosis    Cervical spondylosis    Type II or unspecified type diabetes mellitus without mention of complication, not stated as uncontrolled    Hypertension    Abnormal auditory perception    Eustachian tube dysfunction    Chronic serous otitis media    Nasal polyps    Nasal congestion    Osteoarthritis of left knee    Osteoarthritis of right knee    DIEGO (nonalcoholic steatohepatitis)    Vitamin D deficiency    Iron deficiency anemia    Dyslipidemia    Diabetes mellitus type 2, uncontrolled (HCC)    Left hip pain    Trochanteric bursitis    Fatigue    Daytime somnolence    Snoring    Chronic left shoulder pain    Restless legs syndrome    Generalized anxiety disorder    Primary osteoarthritis, left shoulder    Tendinopathy of left shoulder    Adhesive capsulitis of left shoulder    Lumbar radiculopathy    Back pain       Plan:  See my orders    Medical [facility]  Dayron Sue MD MD  2022 12:08 PM

## 2022-06-05 NOTE — PROGRESS NOTES
Patient arrived from Med Surg. Family acclimated to room and call light. Telemetry applied. Bed in low locked position.

## 2022-06-05 NOTE — PROGRESS NOTES
Patient transferred to 2011. Transferred to bed safely. Hooked up to cardiac monitor and vital signs taken. Assessment to follow.

## 2022-06-05 NOTE — PROGRESS NOTES
RN received call from Dr. Naomi Murphy regarding patient's status. MD states he will place orders to transfer patient and consult Dr. Carleen Perez.

## 2022-06-05 NOTE — PROGRESS NOTES
Physical Therapy        Physical Therapy Cancel Note      DATE: 2022    NAME: Trevor Momin  MRN: [id number]   : 1960      Patient not seen this date for Physical Therapy due to: Other: Attempt @ 1058, Arriving with Raine Smith RN to check on appropriateness of PT tx. Pt is resting in bedside chair with head and B UE on bed. Tx differed at this time per RN, PT will continue to follow.        Electronically signed by Vika Escalona PTA on 2022 at 4:00 PM

## 2022-06-05 NOTE — PROGRESS NOTES
[facility] Internal Medicine    CONSULTATION / HISTORY AND PHYSICAL EXAMINATION            Date:   6/5/2022  Patient name:  Ren Salinas  Date of admission:  5/31/2022  5:16 AM  MRN:   [id number]  Account:  [de-identified]  YOB: 1960  PCP:    Blanca Ratliff MD  Room:   2064/2064-01  Code Status:    Full Code    Physician Requesting Consult: Livan Briggs MD    Reason for Consult:  medical management    Chief Complaint:     Chief Complaint   Patient presents with    Back Pain       History Obtained From:     Patient medical record nursing staff    History of Present Illness:   Patient has past medical history multiple medical problems, morbid obesity, diabetes, history of TIA, CKD, chronic back pain. Patient follows with pain management, orthopedic surgeon. She has history of epidural shots in her back  Admitted with complaints of back pain which started today morning, yesterday she was bending frequently, she was collecting some old close to donate, and likely that has precipitated her back pain.   Back pain is radiating to left buttock and left leg, no difficulty in passing urine, stools  No injury, trauma, fall  In emergency room, patient was given multiple pain shots, with minimal benefit, patient is actively crying in the pain, only comfortable position is leaning on the bed  6/1  Patient still complaining of lower back pain  Morphine is not working    6/2  Patient still complaining of severe back pain, could not get MRI done due to pain and anxiety  Evaluated by Ortho    6/3  Patient feels that pain is slightly better  On scheduled pain medication  Muscle relaxant    6/5  As per nursing report, patient is more sleepy  During my evaluation, she is little slow to respond, but appropriate  On muscle relaxant and pain medication  History of sleep apnea, does not want to use CPAP    Past Medical History:     Past Medical History:   Diagnosis Date    Cervical spondylosis 2009    c-4 c-5, c-5 c-6, c-6 c-7    Generalized anxiety disorder 2020    Hyperlipidemia     Hypertension     Lumbar radiculopathy     DIEGO (nonalcoholic steatohepatitis) 10/12/2014    Obesity     Osteoarthritis of left knee 2014    Restless legs syndrome     Type II or unspecified type diabetes mellitus without mention of complication, not stated as uncontrolled         Past Surgical History:     Past Surgical History:   Procedure Laterality Date     SECTION      [facility]OSCOPY  2012    [facility]    HYSTERECTOMY, TOTAL ABDOMINAL  2008    Polymenorrhagia    SHOULDER SURGERY Left 2021     SHOULDER MANIPULATION WITH PRE OP INTERSCALENE BLOCK     SHOULDER SURGERY Left 2021    SHOULDER MANIPULATION WITH PRE OP INTERSCALENE BLOCK and intraop injection performed by Brissa Drew DO at [address]  2012    [facility]        Medications Prior to Admission:     Prior to Admission medications    Medication Sig Start Date End Date Taking? Authorizing Provider   rosuvastatin (CRESTOR) 20 MG tablet TAKE ONE TABLET BY MOUTH NIGHTLY 22   Heri Anderson MD   celecoxib (CELEBREX) 100 MG capsule Take 1 capsule by mouth 2 times daily 4/15/22   Ashli Stevens MD   ASPIRIN LOW DOSE 81 MG EC tablet TAKE 1 TABLET BY MOUTH ONE TIME A DAY 22   Marga Bullock MD   gabapentin (NEURONTIN) 100 MG capsule Take one cap at 4 pm and 8 pm 4/6/22 10/6/22  Joe Joe MD   tiZANidine (ZANAFLEX) 2 MG tablet Take one tab at 4 pm and 8 pm as needed for muscle spasms 22   Joe Joe MD   gabapentin (NEURONTIN) 300 MG capsule Take 1 capsule by mouth 3 times daily for 30 days.  3/31/22 4/30/22  Jeannine Tafoya MD   lisinopril (PRINIVIL;ZESTRIL) 40 MG tablet Take 1 tablet by mouth daily 3/18/22   Sid Fowler MD   glipiZIDE (GLUCOTROL) 10 MG tablet Take 1 tab bid 22   Ashli Stevens MD busPIRone (BUSPAR) 10 MG tablet Take 1 tab three times daily as needed 1/13/22   Mesha River MD   vitamin D (ERGOCALCIFEROL) 1.25 MG (07089 UT) CAPS capsule TAKE ONE CAPSULE BY MOUTH ONCE A WEEK (EVERY 7 DAYS) 1/11/22   Maria Esther Ferguson MD   TRULICITY 1.5 YK/9.3HT [facility] INJECT THE CONTENTS OF ONE SYRINGE UNDER THE SKIN ONCE WEEKLY 1/3/22   Marcelino Ortega MD   chlorthalidone (HYGROTON) 25 MG tablet TAKE 1 TABLET BY MOUTH ONE TIME A DAY 11/16/21   Sangeeta Gonzalez MD   SYNJARDY 5-1000 MG TABS TAKE 1 TABLET BY MOUTH 2 TIMES A DAY 11/1/21   Mesha River MD   omeprazole (PRILOSEC) 20 MG delayed release capsule Take 1 capsule by mouth 2 times daily 7/26/21   Mesha River MD   rOPINIRole (REQUIP) 2 MG tablet TAKE 2 TABLETS BY MOUTH NIGHTLY 7/26/21   Mesha River MD   albuterol sulfate HFA (PROVENTIL HFA) 108 (90 Base) MCG/ACT inhaler Inhale 2 puffs into the lungs every 4 hours as needed for Wheezing 4/20/21 4/20/22  Mesha River MD   repaglinide (PRANDIN) 1 MG tablet TAKE 1 TABLET BY MOUTH 3 TIMES A DAY BEFORE MEALS 12/8/20   Mesha River MD   blood glucose test strips (PRODIGY NO CODING BLOOD GLUC) strip Use to test once daily and as needed as directed by provider 11/12/20   Laura Cervantes MD   diclofenac sodium (VOLTAREN) 1 % GEL Apply 2 g topically 2 times daily  Patient taking differently: Apply 2 g topically 2 times daily Prn pain 11/12/20   Laura Cervantes MD   acetaminophen (ACETAMINOPHEN EXTRA STRENGTH) 500 MG tablet Take 1,000 mg by mouth daily as needed for Pain    Historical Provider, MD   bimatoprost (LUMIGAN) 0.01 % SOLN ophthalmic drops Place 1 drop into both eyes nightly 4/26/18   Historical Provider, MD   PRODIGY LANCETS 28G MISC 1 Bottle by Does not apply route three times daily 4/26/19   Mesha River MD   Blood Glucose Monitoring Suppl (PRODIGY AUTOCODE BLOOD GLUCOSE) w/Device KIT 1 Device by Does not apply route 3 times daily 4/26/19   Mesha River MD        Allergies: Juan Manuel    Social History:     Tobacco:    reports that she has never smoked. She has never used smokeless tobacco.  Alcohol:      reports current alcohol use. Drug Use:  reports no history of drug use. Family History:     Family History   Problem Relation Age of Onset    Heart Attack Mother     Diabetes Mother     Diabetes Father     Heart Attack Father        Review of Systems:     Positive and Negative as described in HPI. CONSTITUTIONAL:  negative for fevers, chills, sweats, fatigue, weight loss  HEENT:  negative for vision, hearing changes, runny nose, throat pain  RESPIRATORY:  negative for shortness of breath, cough, congestion, wheezing. CARDIOVASCULAR:  negative for chest pain, palpitations. GASTROINTESTINAL:  negative for nausea, vomiting, diarrhea, constipation, change in bowel habits, abdominal pain   GENITOURINARY:  negative for difficulty of urination, burning with urination, frequency   INTEGUMENT:  negative for rash, skin lesions, easy bruising   HEMATOLOGIC/LYMPHATIC:  negative for swelling/edema   ALLERGIC/IMMUNOLOGIC:  negative for urticaria , itching  ENDOCRINE:  negative increase in drinking, increase in urination, hot or cold intolerance  MUSCULOSKELETAL: Lower back pain  NEUROLOGICAL:  negative for headaches, dizziness, lightheadedness, numbness, pain, tingling extremities  BEHAVIOR/PSYCH:      Physical Exam:     BP (!) 116/98   Pulse (!) 121   Temp 99.5 °F (37.5 °C)   Resp 20   SpO2 93%   Temp (24hrs), Av.7 °F (37.1 °C), Min:98.1 °F (36.7 °C), Max:99.5 °F (37.5 °C)    Recent Labs     22  0609 22  0919 22  1149   POCGLU 266* 225* 295* 256*     No intake or output data in the 24 hours ending 22 0938    General Appearance:  alert, well appearing, and in no acute distress, central obesity present  Mental status: oriented to person, place, and time with normal affect  Head:  normocephalic, atraumatic.   Eye: no icterus, redness, pupils equal and reactive, extraocular eye movements intact, conjunctiva clear  Ear: normal external ear, no discharge, hearing intact  Nose:  no drainage noted  Mouth: mucous membranes moist  Neck: supple, no carotid bruits, thyroid not palpable  Lungs: Bilateral equal air entry, clear to ausculation, no wheezing, rales or rhonchi, normal effort  Cardiovascular: normal rate, regular rhythm, no murmur, gallop, rub. Abdomen: Soft, nontender, nondistended, normal bowel sounds, no hepatomegaly or splenomegaly  Neurologic: Power in left leg is 5/5, patient is not moving left leg because of pain  Skin: No gross lesions, rashes, bruising or bleeding on exposed skin area  Extremities: Tenderness, and left buttock area  Psych: Investigations:      Laboratory Testing:  No results found for this or any previous visit (from the past 24 hour(s)). Consultations:   IP CONSULT TO ORTHOPEDIC SURGERY  Assessment :      Primary Problem  Back pain    Active Hospital Problems    Diagnosis Date Noted    Back pain [M54.9] 05/31/2022     Priority: Medium    Diabetes mellitus type 2, uncontrolled ([address] [E11.65] 10/12/2014    Dyslipidemia [E78.5] 10/12/2014    DIEGO (nonalcoholic steatohepatitis) [K75.81] 10/12/2014    Hypertension [I10]    Principal Problem:    Back pain  Active Problems:    Hypertension    DIEGO (nonalcoholic steatohepatitis)    Dyslipidemia    Diabetes mellitus type 2, uncontrolled ([address]  Resolved Problems:    * No resolved hospital problems.  *        Plan:     Lower back pain with left-sided sciatica, patient had lumbar spine MRI back in April of this year, suggestive of DJD, there is disc problem at the level of L5  Starting patient on Medrol Dosepak, Flexeril, morphine for pain control  Diabetes, restarting home medication, sliding scale  Orthopedic surgery consult  Lovenox for DVT prophylaxis    6/1  Patient is on Medrol Dosepak, Flexeril, starting patient on Percocet  Awaiting input from orthopedics  6/2 Patient blood sugars going high due to Decadron  Starting patient on Lantus 10 unit  Putting patient on scheduled OxyContin extended release 10 mg twice a day with Percocet every 6 for breakthrough  Xanax 1 mg 30 minutes before the procedure  Patient does not like Flexeril, she feels that it is not helping  We will try with baclofen   will have MRI today    6/3  Patient underwent MRI, which is essentially the same  Awaiting placement to SNF  On scheduled pain medication    6/4  Patient pain is controlled  Has not passed stools in last few days,  Has good bowel sounds  Starting patient on Colace  Awaiting placement    6/5  Patient, has altered mentation  Likely due to opiates  Patient is tachycardic, will start on IV fluids  Will hold gabapentin, opiates  Stat ABG  Stat CBC and BMP  Discussed with RN at bedside, will keep a close eye  Will hold diet  May need BiPAP    Crissy Fraser MD  6/5/2022  9:38 AM    Copy sent to Dr. Andrews Gonzalez MD    Please note that this chart was generated using voice recognition Dragon dictation software. Although every effort was made to ensure the accuracy of this automated transcription, some errors in transcription may have occurred.

## 2022-06-05 NOTE — PROGRESS NOTES
Patient transferred to PCU room 2084, from [facility]- Room 2064. Report called to [facility]. All belongings gathered and sent with patient. Family at bedside with patient.

## 2022-06-05 NOTE — PROGRESS NOTES
Writer in to see patient d/t to patient shaking and acting off. Patient is able to answer all orientation questions but keeps repeating \"I eat to the left\". She is able to tell writer what food is on her plate and that she was just sleeping. Patient is having spasm like behaviors. Writer notified Dr. Tracy Viveros and he will be on his way to assess patient.

## 2022-06-05 NOTE — PROGRESS NOTES
Reviewed patient labs, her creatinine is increased  Will hold lisinopril, baclofen, hydrochlorothiazide, Crestor  Pain medication gabapentin already on hold  Repeating BMP at 3 PM  Starting patient IV fluids  Will do bladder scan  Will repeat BMP daily  Transferring to progressive unit for close monitoring

## 2022-06-06 ENCOUNTER — APPOINTMENT (OUTPATIENT)
Dept: NON INVASIVE DIAGNOSTICS | Age: 62
DRG: 551 | End: 2022-06-06
Payer: COMMERCIAL

## 2022-06-06 ENCOUNTER — TELEPHONE (OUTPATIENT)
Dept: FAMILY MEDICINE CLINIC | Age: 62
End: 2022-06-06

## 2022-06-06 PROBLEM — G92.8 TOXIC METABOLIC ENCEPHALOPATHY: Status: ACTIVE | Noted: 2022-06-06

## 2022-06-06 LAB
ANION GAP SERPL CALCULATED.3IONS-SCNC: 14 MMOL/L (ref 9–17)
BUN BLDV-MCNC: 80 MG/DL (ref 8–23)
C-REACTIVE PROTEIN: 169.6 MG/L (ref 0–5)
CALCIUM SERPL-MCNC: 8.7 MG/DL (ref 8.6–10.4)
CHLORIDE BLD-SCNC: 106 MMOL/L (ref 98–107)
CO2: 18 MMOL/L (ref 20–31)
CORTISOL COLLECTION INFO: NORMAL
CORTISOL: 17.8 UG/DL (ref 2.7–18.4)
CREAT SERPL-MCNC: 2.19 MG/DL (ref 0.5–0.9)
FOLATE: 11.5 NG/ML
GFR AFRICAN AMERICAN: 28 ML/MIN
GFR NON-AFRICAN AMERICAN: 23 ML/MIN
GFR SERPL CREATININE-BSD FRML MDRD: ABNORMAL ML/MIN/{1.73_M2}
GLUCOSE BLD-MCNC: 180 MG/DL (ref 65–105)
GLUCOSE BLD-MCNC: 205 MG/DL (ref 65–105)
GLUCOSE BLD-MCNC: 207 MG/DL (ref 65–105)
GLUCOSE BLD-MCNC: 212 MG/DL (ref 65–105)
GLUCOSE BLD-MCNC: 213 MG/DL (ref 65–105)
GLUCOSE BLD-MCNC: 215 MG/DL (ref 65–105)
GLUCOSE BLD-MCNC: 215 MG/DL (ref 65–105)
GLUCOSE BLD-MCNC: 217 MG/DL (ref 65–105)
GLUCOSE BLD-MCNC: 238 MG/DL (ref 65–105)
GLUCOSE BLD-MCNC: 239 MG/DL (ref 65–105)
GLUCOSE BLD-MCNC: 244 MG/DL (ref 65–105)
GLUCOSE BLD-MCNC: 245 MG/DL (ref 70–99)
GLUCOSE BLD-MCNC: 274 MG/DL (ref 65–105)
GLUCOSE BLD-MCNC: 288 MG/DL (ref 65–105)
GLUCOSE BLD-MCNC: 293 MG/DL (ref 65–105)
GLUCOSE BLD-MCNC: 295 MG/DL (ref 65–105)
HCT VFR BLD CALC: 37.4 % (ref 36–46)
HEMOGLOBIN: 12 G/DL (ref 12–16)
LV EF: 68 %
LVEF MODALITY: NORMAL
MCH RBC QN AUTO: 28.9 PG (ref 26–34)
MCHC RBC AUTO-ENTMCNC: 32.1 G/DL (ref 31–37)
MCV RBC AUTO: 90.3 FL (ref 80–100)
PDW BLD-RTO: 13.6 % (ref 11.5–14.9)
PLATELET # BLD: 235 K/UL (ref 150–450)
PMV BLD AUTO: 8.4 FL (ref 6–12)
POTASSIUM SERPL-SCNC: 4.8 MMOL/L (ref 3.7–5.3)
PROCALCITONIN: 0.26 NG/ML
RBC # BLD: 4.14 M/UL (ref 4–5.2)
SODIUM BLD-SCNC: 138 MMOL/L (ref 135–144)
TSH SERPL DL<=0.05 MIU/L-ACNC: 0.45 UIU/ML (ref 0.3–5)
VITAMIN B-12: 579 PG/ML (ref 232–1245)
WBC # BLD: 11.7 K/UL (ref 3.5–11)

## 2022-06-06 PROCEDURE — 82746 ASSAY OF FOLIC ACID SERUM: CPT

## 2022-06-06 PROCEDURE — 6360000002 HC RX W HCPCS: Performed by: INTERNAL MEDICINE

## 2022-06-06 PROCEDURE — 6370000000 HC RX 637 (ALT 250 FOR IP): Performed by: INTERNAL MEDICINE

## 2022-06-06 PROCEDURE — 80048 BASIC METABOLIC PNL TOTAL CA: CPT

## 2022-06-06 PROCEDURE — 97116 GAIT TRAINING THERAPY: CPT

## 2022-06-06 PROCEDURE — 84443 ASSAY THYROID STIM HORMONE: CPT

## 2022-06-06 PROCEDURE — 83835 ASSAY OF METANEPHRINES: CPT

## 2022-06-06 PROCEDURE — 97530 THERAPEUTIC ACTIVITIES: CPT

## 2022-06-06 PROCEDURE — 99232 SBSQ HOSP IP/OBS MODERATE 35: CPT | Performed by: INTERNAL MEDICINE

## 2022-06-06 PROCEDURE — 82607 VITAMIN B-12: CPT

## 2022-06-06 PROCEDURE — 96372 THER/PROPH/DIAG INJ SC/IM: CPT

## 2022-06-06 PROCEDURE — 94761 N-INVAS EAR/PLS OXIMETRY MLT: CPT

## 2022-06-06 PROCEDURE — 93306 TTE W/DOPPLER COMPLETE: CPT

## 2022-06-06 PROCEDURE — 82533 TOTAL CORTISOL: CPT

## 2022-06-06 PROCEDURE — 36415 COLL VENOUS BLD VENIPUNCTURE: CPT

## 2022-06-06 PROCEDURE — 86140 C-REACTIVE PROTEIN: CPT

## 2022-06-06 PROCEDURE — 6370000000 HC RX 637 (ALT 250 FOR IP): Performed by: STUDENT IN AN ORGANIZED HEALTH CARE EDUCATION/TRAINING PROGRAM

## 2022-06-06 PROCEDURE — 97535 SELF CARE MNGMENT TRAINING: CPT

## 2022-06-06 PROCEDURE — 2060000000 HC ICU INTERMEDIATE R&B

## 2022-06-06 PROCEDURE — 84145 PROCALCITONIN (PCT): CPT

## 2022-06-06 PROCEDURE — 2580000003 HC RX 258: Performed by: NURSE PRACTITIONER

## 2022-06-06 PROCEDURE — 97110 THERAPEUTIC EXERCISES: CPT

## 2022-06-06 PROCEDURE — 85027 COMPLETE CBC AUTOMATED: CPT

## 2022-06-06 RX ORDER — MIDODRINE HYDROCHLORIDE 10 MG/1
10 TABLET ORAL 3 TIMES DAILY PRN
Status: DISCONTINUED | OUTPATIENT
Start: 2022-06-06 | End: 2022-06-10 | Stop reason: HOSPADM

## 2022-06-06 RX ORDER — HYDRALAZINE HYDROCHLORIDE 20 MG/ML
10 INJECTION INTRAMUSCULAR; INTRAVENOUS EVERY 6 HOURS PRN
Status: DISCONTINUED | OUTPATIENT
Start: 2022-06-06 | End: 2022-06-10 | Stop reason: HOSPADM

## 2022-06-06 RX ORDER — 0.9 % SODIUM CHLORIDE 0.9 %
500 INTRAVENOUS SOLUTION INTRAVENOUS ONCE
Status: COMPLETED | OUTPATIENT
Start: 2022-06-06 | End: 2022-06-06

## 2022-06-06 RX ORDER — GABAPENTIN 100 MG/1
100 CAPSULE ORAL DAILY
Status: DISCONTINUED | OUTPATIENT
Start: 2022-06-06 | End: 2022-06-07

## 2022-06-06 RX ADMIN — ACETAMINOPHEN 500 MG: 500 TABLET ORAL at 22:25

## 2022-06-06 RX ADMIN — INSULIN LISPRO 2 UNITS: 100 INJECTION, SOLUTION INTRAVENOUS; SUBCUTANEOUS at 12:28

## 2022-06-06 RX ADMIN — GABAPENTIN 100 MG: 100 CAPSULE ORAL at 12:28

## 2022-06-06 RX ADMIN — HEPARIN SODIUM 5000 UNITS: 5000 INJECTION INTRAVENOUS; SUBCUTANEOUS at 06:04

## 2022-06-06 RX ADMIN — SODIUM CHLORIDE 500 ML: 0.9 INJECTION, SOLUTION INTRAVENOUS at 03:33

## 2022-06-06 RX ADMIN — INSULIN LISPRO 2 UNITS: 100 INJECTION, SOLUTION INTRAVENOUS; SUBCUTANEOUS at 17:02

## 2022-06-06 RX ADMIN — LATANOPROST 1 DROP: 50 SOLUTION OPHTHALMIC at 20:44

## 2022-06-06 RX ADMIN — DOCUSATE SODIUM 100 MG: 100 CAPSULE, LIQUID FILLED ORAL at 20:50

## 2022-06-06 RX ADMIN — INSULIN LISPRO 1 UNITS: 100 INJECTION, SOLUTION INTRAVENOUS; SUBCUTANEOUS at 21:02

## 2022-06-06 RX ADMIN — HEPARIN SODIUM 5000 UNITS: 5000 INJECTION INTRAVENOUS; SUBCUTANEOUS at 20:50

## 2022-06-06 RX ADMIN — HEPARIN SODIUM 5000 UNITS: 5000 INJECTION INTRAVENOUS; SUBCUTANEOUS at 14:51

## 2022-06-06 ASSESSMENT — PAIN SCALES - GENERAL
PAINLEVEL_OUTOF10: 7
PAINLEVEL_OUTOF10: 0
PAINLEVEL_OUTOF10: 7
PAINLEVEL_OUTOF10: 4

## 2022-06-06 ASSESSMENT — PAIN DESCRIPTION - LOCATION
LOCATION: HIP
LOCATION: BACK

## 2022-06-06 ASSESSMENT — PAIN DESCRIPTION - FREQUENCY
FREQUENCY: CONTINUOUS
FREQUENCY: CONTINUOUS

## 2022-06-06 ASSESSMENT — PAIN DESCRIPTION - DESCRIPTORS
DESCRIPTORS: SHARP
DESCRIPTORS: DISCOMFORT
DESCRIPTORS: SHARP

## 2022-06-06 ASSESSMENT — PAIN - FUNCTIONAL ASSESSMENT
PAIN_FUNCTIONAL_ASSESSMENT: PREVENTS OR INTERFERES SOME ACTIVE ACTIVITIES AND ADLS
PAIN_FUNCTIONAL_ASSESSMENT: ACTIVITIES ARE NOT PREVENTED

## 2022-06-06 ASSESSMENT — ENCOUNTER SYMPTOMS: ABDOMINAL PAIN: 0

## 2022-06-06 ASSESSMENT — PAIN DESCRIPTION - PAIN TYPE
TYPE: ACUTE PAIN
TYPE: ACUTE PAIN

## 2022-06-06 ASSESSMENT — PAIN DESCRIPTION - ORIENTATION
ORIENTATION: LOWER;MID
ORIENTATION: LEFT
ORIENTATION: LEFT

## 2022-06-06 NOTE — PROGRESS NOTES
Occupational Therapy  Facility/Department: [facility]  Daily Treatment Note  NAME: Freda Singh  : 1960  MRN: [id number]    Date of Service: 2022    Discharge Recommendations:     OT Equipment Recommendations  Other: written handout provided and adl equipment ed initiated re raised toilet seat, tub seat vs tub bench, hand held shower, long sponge, long shoe horn, sock aide, reachers. ed re sponge bathe and wash hair in basin if needed due to mobility -cannot access tub. Patient Diagnosis(es): The primary encounter diagnosis was Sciatica of left side. A diagnosis of Low back pain with sciatica, sciatica laterality unspecified, unspecified back pain laterality, unspecified chronicity was also pertinent to this visit. Assessment    Activity Tolerance: Patient limited by pain  Other: written handout provided and adl equipment ed initiated re raised toilet seat, tub seat vs tub bench, hand held shower, long sponge, long shoe horn, sock aide, reachers. ed re sponge bathe and wash hair in basin if needed due to mobility -cannot access tub. Plan   Plan  Times per Week: 4-5  Times per Day: Twice a day (1-2 times daily)  Current Treatment Recommendations: ROM;Balance training;Strengthening; Functional mobility training; Endurance training;Equipment evaluation, education, & procurement;Patient/Caregiver education & training;Pain management;Self-Care / ADL; Home management training     Restrictions       Subjective   Subjective  Subjective: \" can't. \" pt states this multiple times before even attempting transfer, pt sitting in chair upon arrival. decreased cognition noted compared to Friday when writer worked with pt last. delayed responses noted, pt requires extended time for 1 step directions this date.  co-tx with Supa PTA  Orientation  Overall Orientation Status: Within Normal Limits  Pain: (P) Pain 8/10  Cognition  Overall Cognitive Status: WNL  Arousal/Alertness: Delayed responses to stimuli  Following Commands: Follows one step commands with increased time  Safety Judgement: Decreased awareness of need for assistance  Problem Solving: Assistance required to identify errors made  Insights: Fully aware of deficits  Initiation: Requires cues for all  Sequencing: Requires cues for some  Cognition Comment: pt   Patient affect[de-identified] Flat        Objective    Vitals  Vitals  BP Location: Right upper arm  O2 Device: None (Room air)  Bed Mobility Training  Bed Mobility Training: Yes   Overall Level of Assistance: Maximum assistance;Assist X2  Interventions: Manual cues; Safety awareness training; Tactile cues; Verbal cues  Sit to Supine:  Maximum assistance;Assist X2;Additional time  Scooting: Maximum assistance; Additional time;Assist X2  Balance  Sitting: Impaired (RW positioning)  Standing: With support  Standing - Static: Fair (heavily forward flexed posture)  Standing - Dynamic: Poor (Head downward and torso forward flexed)  Transfer Training  Transfer Training: Yes  Overall Level of Assistance: Contact-guard assistance (RW)  Sit to Stand: Contact-guard assistance  Stand to Sit: Contact-guard assistance (VCs for hand placement, P safety awareness note, pt attempts to sit EOB without being lined up safely)  Gait Training  Gait Training: Yes  Gait  Overall Level of Assistance: Contact-guard assistance  Interventions: Safety awareness training; Tactile cues; Verbal cues;Manual cues  Assistive Device: Walker, rolling     ADL  Feeding: Maximum assistance (pt asks writer to hold cup to drink even though pt can do it c setup)  Grooming: Maximum assistance; Increased time to complete (  UE Bathing: Maximum assistance; Increased time to complete  LE Bathing: Setup; Moderate assistance  UE Dressing: Maximum assistance; Increased time to complete  LE Dressing: Setup; Moderate assistance  Toileting: Minimal assistance  Additional Comments: VCs for initiation and continuation of tasks this date, grooming/UE bathing completed sitting up in chair, other answers based on skilled observation/reasoniing.,pt limited by decreased endurance, decreased cognition  OT Exercises  A/AROM Exercises: BUE, AROM x 10-15 reps to support mobility and transfers. pt requires VCs for correct counting and initiation of exercises           Patient Education  Education Given To: Patient  Education Provided: Role of Therapy;Plan of Care;Precautions;Transfer Training (functional transfer saferty and sequencing c RW)  Education Method: Demonstration;Verbal;Video;Printed Information/Hand-outs  Barriers to Learning: Cognition (pt self limiting during al;l tasks this date)  Education Outcome: Continued education needed    Goals  Short Term Goals  Time Frame for Short term goals: 1 week  Short Term Goal 1: mod indep LE bathe and dress  Short Term Goal 2: mod indep amb to obtain set up for adls with fair pain control  Short Term Goal 3: carter 6-8 min stand, ambulate for adls with fair pain control  Short Term Goal 4: indep with UE HEP for LUE ROM and BUE strengthening (when tolerated)  Short Term Goal 5: pt to verbalize understanding of bathroom equipment which may be helpful to her.   Patient Goals   Patient goals : return to prior level of indep with adls including work and amb without cane       Therapy Time   Individual Concurrent Group Co-treatment   Time In 1319         Time Out 1406         Minutes [address] [place] [address]

## 2022-06-06 NOTE — PROGRESS NOTES
training; Tactile cues; Verbal cues  Sit to Supine: Maximum assistance;Assist X2;Additional time  Scooting: Maximum assistance; Additional time;Assist X2  Balance  Sitting: Impaired   Sitting - Static: Good (unsupported)  Sitting - Dynamic: Fair (occasional)  Standing: Impaired  Standing - Static: Fair (heavily forward flexed posture)  Standing - Dynamic: Poor (Head downward and torso forward flexed)  Transfer Training  Transfer Training: Yes  Overall Level of Assistance: Maximum assistance;Assist X2;Additional time; Adaptive equipment (Max cues, pt is self limiting)  Sit to Stand: Maximum assistance;Assist X2;Additional time; Adaptive equipment  Stand to Sit: Maximum assistance;Assist X2;Adaptive equipment (Max cues for safe hand placement)  Stand Pivot Transfers: Maximum assistance; Adaptive equipment; Additional time;Assist X2 (Max cues for safe use of RW)  Bed to Chair: Maximum assistance; Adaptive equipment; Additional time;Assist X2 (Max cues for safe sequencing)  Gait Training  Gait Training: Yes  Gait  Overall Level of Assistance: Maximum assistance;Assist X2;Additional time; Adaptive equipment (Pt is self limiting, \"I can't\" \"I can't\")  Interventions: Safety awareness training; Tactile cues; Verbal cues;Manual cues  Base of Support: Widened  Speed/Kaelyn: Slow;Pace decreased (< 100 feet/min)  Step Length: Left shortened;Right shortened  Swing Pattern: Left asymmetrical;Right asymmetrical  Stance: Left decreased;Right decreased  Gait Abnormalities: Decreased step clearance; Path deviations; Shuffling gait;Trunk sway increased  Distance (ft): 3 Feet  Assistive Device: Walker, rolling     PT Exercises  A/AROM Exercises: Seated bilat LE exercises x8-10reps (pt reports \"I can't\")  Static Standing Balance Exercises: Standing tolerance ~2min. with max encouragement, pt attempts to sit prior to reaching seat.              Goals  Short Term Goals  Time Frame for Short term goals: 1-2 visits  Short term goal 1: gait with RW mod-I x 50-75ft to ambulate safely within home  Short term goal 2: negotiate flight of stairs with rail and SBA to safely enter home    Education  Patient Education  Education Given To: Patient  Education Provided: Energy Conservation;Transfer Training;Family Education; Fall Prevention Strategies  Education Provided Comments: importance of B TKE for mobiltiy, Energy conservation techniques, and benefits of continued PT.    Education Method: Demonstration;Verbal  Barriers to Learning: None  Education Outcome: Verbalized understanding    Therapy Time   Individual Concurrent Group Co-treatment   Time In 1337         Time Out 1408         Minutes [address] [place], [place]

## 2022-06-06 NOTE — CARE COORDINATION
[facility] can accept patient, and will start precert today 6/6. LSW will continue to follow.      Electronically signed by [facility] on 6/6/22 at 1:33 PM EDT

## 2022-06-06 NOTE — CONSULTS
[facility] PULMON[facility] & CRITICAL CARE SPECIALISTS   CONSULT NOTE:      DATE OF CONSULT 6/6/2022    REASON FOR CONSULTATION:   Altered mental status. Dr. Donnette Bence note states she may need BiPAP. Patient has history of sleep apnea and refuses CPAP. PCP Beth Sommers MD     CHIEF COMPLAINT: Back Pain    HISTORY OF PRESENT ILLNESS:     Tanna Estrada is a 65year old female with past medical history of KVNG, obesity, DM2, hypertension, cervical spondylosis, and lumbar radiculopathy who presented to the ED with complaints of left buttock pain that radiated down her left lateral leg. Dr. Tony Valdez has been in charge of her care while in the hospital and has been working on pain management. On 6/5 patient showed signs of altered mental status believed to be due to opiate pain medication, low blood pressure, and tachycardia and was transferred to ICU. Dr. Donnette Bence note states that he believes the patient may need Bipap and has consulted respiratory. During interview today patient was alert and lying in bed, but was confused and had latency in answering questions and was confused about her occupation. The patient states she thinks she used to work for [employer]. She denies ever smoking. The patient denies a history of sleep apnea and states that she does not remember anyone telling her that she snores. She denies waking up at night gasping. The patient denies shortness of breath or chest pain, but states that she sleeps with 2 pillows at night. She states she has pain in her legs. Denies any chest pain, fevers, chills, cough or sputum production she denies any dyspnea. She is a non-smoker.       ALLERGIES:  Allergies   Allergen Reactions    Codeine Nausea And Vomiting       HOME MEDICATIONS:  Medications Prior to Admission: rosuvastatin (CRESTOR) 20 MG tablet, TAKE ONE TABLET BY MOUTH NIGHTLY  celecoxib (CELEBREX) 100 MG capsule, Take 1 capsule by mouth 2 times daily  ASPIRIN LOW DOSE 81 MG EC tablet, TAKE 1 TABLET BY MOUTH ONE TIME A DAY  gabapentin (NEURONTIN) 100 MG capsule, Take one cap at 4 pm and 8 pm  tiZANidine (ZANAFLEX) 2 MG tablet, Take one tab at 4 pm and 8 pm as needed for muscle spasms  gabapentin (NEURONTIN) 300 MG capsule, Take 1 capsule by mouth 3 times daily for 30 days.   lisinopril (PRINIVIL;ZESTRIL) 40 MG tablet, Take 1 tablet by mouth daily  glipiZIDE (GLUCOTROL) 10 MG tablet, Take 1 tab bid  busPIRone (BUSPAR) 10 MG tablet, Take 1 tab three times daily as needed  vitamin D (ERGOCALCIFEROL) 1.25 MG (39919 UT) CAPS capsule, TAKE ONE CAPSULE BY MOUTH ONCE A WEEK (EVERY 7 DAYS)  TRULICITY 1.5 GQ/3.2ZO SOPN, INJECT THE CONTENTS OF ONE SYRINGE UNDER THE SKIN ONCE WEEKLY  chlorthalidone (HYGROTON) 25 MG tablet, TAKE 1 TABLET BY MOUTH ONE TIME A DAY  SYNJARDY 5-1000 MG TABS, TAKE 1 TABLET BY MOUTH 2 TIMES A DAY  omeprazole (PRILOSEC) 20 MG delayed release capsule, Take 1 capsule by mouth 2 times daily  rOPINIRole (REQUIP) 2 MG tablet, TAKE 2 TABLETS BY MOUTH NIGHTLY  albuterol sulfate HFA (PROVENTIL HFA) 108 (90 Base) MCG/ACT inhaler, Inhale 2 puffs into the lungs every 4 hours as needed for Wheezing  blood glucose test strips (PRODIGY NO CODING BLOOD GLUC) strip, Use to test once daily and as needed as directed by provider  diclofenac sodium (VOLTAREN) 1 % GEL, Apply 2 g topically 2 times daily (Patient taking differently: Apply 2 g topically 2 times daily Prn pain)  acetaminophen (ACETAMINOPHEN EXTRA STRENGTH) 500 MG tablet, Take 1,000 mg by mouth daily as needed for Pain  bimatoprost (LUMIGAN) 0.01 % SOLN ophthalmic drops, Place 1 drop into both eyes nightly  [employer] LANCETS 28G MISC, 1 Bottle by Does not apply route three times daily  Blood Glucose Monitoring Suppl (PRODIGY AUTOCODE BLOOD GLUCOSE) w/Device KIT, 1 Device by Does not apply route 3 times daily      PAST MEDICAL HISTORY:  Past Medical History:   Diagnosis Date    Cervical spondylosis 4/2009    c-4 c-5, c-5 c-6, c-6 c-7    Generalized anxiety disorder 2020    Hyperlipidemia     Hypertension     Lumbar radiculopathy     DIEGO (nonalcoholic steatohepatitis) 10/12/2014    Obesity     Osteoarthritis of left knee 2014    Restless legs syndrome     Type II or unspecified type diabetes mellitus without mention of complication, not stated as uncontrolled        PAST SURGICAL HISTORY:  Past Surgical History:   Procedure Laterality Date     SECTION      COLONOSCOPY  2012    [facility]    HYSTERECTOMY, TOTAL ABDOMINAL  2008    Polymenorrhagia    SHOULDER SURGERY Left 2021     SHOULDER MANIPULATION WITH PRE OP INTERSCALENE BLOCK     SHOULDER SURGERY Left 2021    SHOULDER MANIPULATION WITH PRE OP INTERSCALENE BLOCK and intraop injection performed by Heri Hinds DO at [address] [place] [address] Avenue  2012    [facility]          SOCIAL HISTORY:  Social History     Socioeconomic History    Marital status:      Spouse name: Not on file    Number of children: Not on file    Years of education: Not on file    Highest education level: Not on file   Occupational History    Not on file   Tobacco Use    Smoking status: Never Smoker    Smokeless tobacco: Never Used   Vaping Use    Vaping Use: Never used   Substance and Sexual Activity    Alcohol use: Yes     Alcohol/week: 0.0 standard drinks     Comment: rarely/ 4 times a year    Drug use: No    Sexual activity: Yes   Other Topics Concern    Not on file   Social History Narrative    Not on file     Social Determinants of Health     Financial Resource Strain: Low Risk     Difficulty of Paying Living Expenses: Not very hard   Food Insecurity: No Food Insecurity    Worried About Running Out of Food in the Last Year: Never true    Valerie of Food in the Last Year: Never true   Transportation Needs:     Lack of Transportation (Medical):  Not on file    Lack of Transportation (Non-Medical): Not on file   Physical Activity:     Days of Exercise per Week: Not on file    Minutes of Exercise per Session: Not on file   Stress:     Feeling of Stress : Not on file   Social Connections:     Frequency of Communication with Friends and Family: Not on file    Frequency of Social Gatherings with Friends and Family: Not on file    Attends Restorationist Services: Not on file    Active Member of [address] [place] [address] or Organizations: Not on file    Attends Club or Organization Meetings: Not on file    Marital Status: Not on file   Intimate Partner Violence:     Fear of Current or Ex-Partner: Not on file    Emotionally Abused: Not on file    Physically Abused: Not on file    Sexually Abused: Not on file   Housing Stability:     Unable to Pay for Housing in the Last Year: Not on file    Number of [place] in the Last Year: Not on file    Unstable Housing in the Last Year: Not on file       FAMILY HISTORY:  Family History   Problem Relation Age of Onset    Heart Attack Mother     Diabetes Mother     Diabetes Father     Heart Attack Father        REVIEW OF SYSTEMS:  All other systems reviewed and are negative. MSK: bilaterally leg pain  Psych: confusion    PHYSICAL EXAM:  Vital Signs Blood pressure 111/61, pulse (!) 116, temperature 98.6 °F (37 °C), temperature source Oral, resp. rate 16, weight 216 lb 7.9 oz (98.2 kg), SpO2 93 %, not currently breastfeeding. Admission Weight Weight: 216 lb 7.9 oz (98.2 kg)    General Appearance  Pleasant, confused female in moderate distress. Head  Normocephalic, without obvious abnormality, atraumatic    Eyes  conjunctivae/corneas clear. PERRL, EOM's intact.   ENT  Mucosa pink and moist, no erythema, wide based tongue and small aperture throat with low hanging palate  Neck  no adenopathy, no carotid bruit, no JVD, supple, symmetrical, trachea midline and thyroid not enlarged, symmetric, no tenderness/mass/nodules  Lungs  CTA bilaterally, no wheezes rales or ronchi  Heart: regular rate and rhythm, S1, S2 normal, no murmur, click, rub or gallop  Abdomen  soft, non-tender; bowel sounds normal; no masses,  no organomegaly  Extremities Warm, without edema    Skin  Skin color, texture, turgor normal. No rashes or lesions  Neurologic: Alert and oriented X 3, normal strength and tone. Confused about occupation and has slow response time to questions. Imaging       Lab Review  CBC     Lab Results   Component Value Date    WBC 11.7 06/06/2022    RBC 4.14 06/06/2022    HGB 12.0 06/06/2022    HCT 37.4 06/06/2022     06/06/2022    MCV 90.3 06/06/2022    MCH 28.9 06/06/2022    MCHC 32.1 06/06/2022    RDW 13.6 06/06/2022    LYMPHOPCT 17 06/05/2022    MONOPCT 11 06/05/2022    BASOPCT 0 06/05/2022    MONOSABS 1.70 06/05/2022    LYMPHSABS 2.60 06/05/2022    EOSABS 0.20 06/05/2022    BASOSABS 0.00 06/05/2022    DIFFTYPE NOT REPORTED 07/21/2021       BMP   Lab Results   Component Value Date     06/06/2022    K 4.8 06/06/2022     06/06/2022    CO2 18 06/06/2022    BUN 80 06/06/2022    CREATININE 2.19 06/06/2022    GLUCOSE 245 06/06/2022    GLUCOSE 137 05/19/2012    CALCIUM 8.7 06/06/2022       LFTS  Lab Results   Component Value Date    ALKPHOS 84 05/31/2022    ALT 36 05/31/2022    AST 27 05/31/2022    PROT 7.5 05/31/2022    BILITOT 0.31 05/31/2022    BILIDIR <0.08 08/04/2020    IBILI CANNOT BE CALCULATED 08/04/2020    LABALBU 4.5 05/31/2022    LABALBU 4.1 05/19/2012       INR  No results for input(s): PROTIME, INR in the last 72 hours. APTT  No results for input(s): APTT in the last 72 hours. Lactic Acid  No results found for: LACTA     PRO-BNP   No results for input(s): PROBNP in the last 72 hours. ABGs: No results found for: PHART, PO2ART, DGL6ERS    No results found for: IFIO2, MODE, SETTIDVOL, SETPEEP      Impression    Altered Mental Status and confusion  Morbid obesity  Possible-high suspicion for sleep apnea- no sleep study history.  Monitor O2 levels and assess need for BiPAP or CPAP as SPO2 is 93%. Tachycardia  HTN- within normal limits. Continue medication management. DM2- glucose remains elevated at 245. Consider insulin. Acute kidney injury  Back and Leg Pain- persists despite Gabapentin. Opiates were discontinued due to mental status  SNF placement pending    Aleshasrini Alexisjose carlos MS 3 OU-HOCM      Patient seen and examined independently by me. Above discussed and I agree with medical student note except where indicated in the EMR revision history. Also see my additional comments and changes indicated by discrete font, text color, italics, and/or initials. Labs, cultures, and radiographs where available were reviewed. Mental status seems to be improved compared to yesterday. I suspect that this is a due in part to her worsening renal failure which has improved. She did have urinary retention. Bourgeois catheter is now in place and creatinine has come down. However, even before she had the acute mental status changes her renal function had deteriorated. While she is improved she is not back to her baseline. There is no evidence for CO2 retention based on her venous blood gas and also her end-tidal CO2 monitor. I would slowly reintroduce her pain medications and gabapentin so she has no withdrawal symptoms. She may benefit from outpatient chronic pain services. She appears to have signs and symptoms consistent with obstructive sleep apnea as well as the body habitus. Additionally, she has underlying risk factors for sleep apnea including diabetes and hypertension as well as her morbid obesity. She can transfer out of the ICU back to progressive unit.   Case discussed with housestaff and primary service attending, Dr. Minh Perea        Electronically signed by Saniya Young MD on 6/6/2022 at 11:54 AM

## 2022-06-06 NOTE — PROGRESS NOTES
Writer spoke with Dr. Saima To regarding pt low BP both manual and automatic. Orders received for 500 mL fluid bolus.

## 2022-06-06 NOTE — CARE COORDINATION
[address] or [facility] cannot accept patient. [facility] is still reviewing . LSW will continue to follow.      Electronically signed by Jazzmine Moreau on 6/6/22 at 9:36 AM EDT

## 2022-06-06 NOTE — TELEPHONE ENCOUNTER
FMLA form faxed to office from [employer] [facility] [employer], for Dr Bhakta Press to fill-out.  Form scanned in chart and placed in [email or web address] box

## 2022-06-06 NOTE — PROGRESS NOTES
[facility] [facility]    PROGRESS NOTE             6/6/2022    7:38 AM    Name:   Darrell Serrano  MRN:     [id number]     Acct:      [de-identified]   Room:   2011/2011-01  IP Day:  0  Admit Date:  5/31/2022  5:16 AM    PCP:  Mesha River MD  Code Status:  Full Code    Subjective:     C/C:   Chief Complaint   Patient presents with    Back Pain     Interval History Status: not changed. Patient seen and examined at bedside. Patient was transferred to the ICU due to low BP, tachycardia and AMS. All pain medications were held. This morning, patient is minimally responsive to questions, stares off and occasionally doesn't answer questions at all. Patient mentions she just has bilateral lower extremity pain, not back pain. Could not describe the pain to me. Did mention she does not have any saddle anesthesia or bowel/bladder incontinence     Review of Systems:     Review of Systems   Cardiovascular: Negative for chest pain. Gastrointestinal: Negative for abdominal pain. Genitourinary: Negative for difficulty urinating. Musculoskeletal:        Bilateral leg pain   Neurological: Positive for dizziness. Psychiatric/Behavioral: Positive for dysphoric mood. Medications: Allergies:     Allergies   Allergen Reactions    Codeine Nausea And Vomiting       Current Meds:   Scheduled Meds:    heparin (porcine)  5,000 Units SubCUTAneous 3 times per day    docusate sodium  100 mg Oral BID    insulin glargine  10 Units SubCUTAneous Daily    [Held by provider] oxyCODONE  10 mg Oral 2 times per day    [Held by provider] baclofen  5 mg Oral TID    aspirin  81 mg Oral Daily    latanoprost  1 drop Both Eyes Nightly    [Held by provider] busPIRone  10 mg Oral BID    [Held by provider] chlorthalidone  25 mg Oral Daily    [Held by provider] gabapentin  300 mg Oral TID    [Held by provider] glipiZIDE  10 mg Oral BID AC    [Held by provider] pantoprazole  40 mg Oral QAM AC    [Held by provider] lisinopril  40 mg Oral Daily    [Held by provider] rOPINIRole  2 mg Oral Nightly    [Held by provider] rosuvastatin  20 mg Oral Nightly    insulin lispro  0-6 Units SubCUTAneous TID WC    insulin lispro  0-3 Units SubCUTAneous Nightly    [Held by provider] enoxaparin  40 mg SubCUTAneous Daily     Continuous Infusions:    sodium chloride 100 mL/hr at 22 1939    dextrose       PRN Meds: [Held by provider] oxyCODONE-acetaminophen, glucose, dextrose bolus **OR** dextrose bolus, glucagon (rDNA), dextrose, acetaminophen, albuterol sulfate HFA, [Held by provider] LORazepam    Data:     Past Medical History:   has a past medical history of Cervical spondylosis, Generalized anxiety disorder, Hyperlipidemia, Hypertension, Lumbar radiculopathy, DIEGO (nonalcoholic steatohepatitis), Obesity, Osteoarthritis of left knee, Restless legs syndrome, and Type II or unspecified type diabetes mellitus without mention of complication, not stated as uncontrolled. Social History:   reports that she has never smoked. She has never used smokeless tobacco. She reports current alcohol use. She reports that she does not use drugs. Family History:   Family History   Problem Relation Age of Onset    Heart Attack Mother     Diabetes Mother     Diabetes Father     Heart Attack Father        Vitals:  /61   Pulse (!) 114   Temp 98.6 °F (37 °C) (Oral)   Resp 14   Wt 216 lb 7.9 oz (98.2 kg)   SpO2 93%   BMI 39.60 kg/m²   Temp (24hrs), Av °F (37.2 °C), Min:98.4 °F (36.9 °C), Max:99.7 °F (37.6 °C)    Recent Labs     22  0919 22  1149   POCGLU 295* 256*       I/O(24Hr):     Intake/Output Summary (Last 24 hours) at 2022 0738  Last data filed at 2022 0629  Gross per 24 hour   Intake 2108.39 ml   Output 3200 ml   Net -1091.61 ml       Labs:  [unfilled]    Lab Results   Component Value Date/Time    SPECIAL NOT REPORTED 10/07/2021 06:24 PM     Lab Results   Component Value Date/Time    CULTURE NO SIGNIFICANT GROWTH 10/07/2021 06:24 PM       [unfilled]    Radiology:    XR LUMBAR SPINE (2-3 VIEWS)    Result Date: 5/31/2022  EXAMINATION: THREE XRAY VIEWS OF THE LUMBAR SPINE 5/31/2022 4:27 pm COMPARISON: None. HISTORY: ORDERING SYSTEM PROVIDED HISTORY: Lower back Pain TECHNOLOGIST PROVIDED HISTORY: Lower back Pain Reason for Exam: PT CO acute lower back pain X several days with NKI per pt. FINDINGS: There is a mild dextroscoliosis of the lower lumbar spine. There are 5 lumbar segments There is mild anterior spurring of the visualized lumbar vertebral bodies There is moderate L5-S1 joint space compromise. The remaining disc spaces and vertical heights of the vertebral bodies are maintained There is no pedicular/pars interarticularis defect, fracture or dislocation seen     Scoliosis and L5-S1 degenerative changes     MRI LUMBAR SPINE WO CONTRAST    Result Date: 6/2/2022  EXAMINATION: MRI OF THE LUMBAR SPINE WITHOUT CONTRAST, 6/2/2022 12:46 pm TECHNIQUE: Multiplanar multisequence MRI of the lumbar spine was performed without the administration of intravenous contrast. COMPARISON: 03/22/2022 HISTORY: ORDERING SYSTEM PROVIDED HISTORY: lumbar left radicular pain FINDINGS: BONES/ALIGNMENT: There is loss of the normal lordotic curvature. The vertebral body heights are maintained. The bone marrow signal appears unremarkable. Mild degenerative disc height loss at L5-S1. Mild multilevel facet arthropathy. SPINAL CORD: The conus terminates normally. SOFT TISSUES: No paraspinal mass identified. L1-L2: There is no significant disc herniation, spinal canal stenosis or neural foraminal narrowing. L2-L3: There is no significant disc herniation, spinal canal stenosis or neural foraminal narrowing. L3-L4: There is no significant disc herniation, spinal canal stenosis or neural foraminal narrowing.  L4-L5: There is no significant disc herniation, spinal canal stenosis or neural [E78.5] 10/12/2014    DIEGO (nonalcoholic steatohepatitis) [K75.81] 10/12/2014    Hypertension [I10]        Plan:        Acute on chronic lumbar back pain  - MRI 6/2 - L5-S1 disc bulging  - Given Medrol Dosepak, Baclofen, Morphine   - Oxycodone, Ativan held   - Ortho consulted   - AMS and tachycardia -> transferred to ICU    Delirium   - CXR yesterday - bibasilar atelectasis   - UA neg  - Bedside swallow study  - TSH, B12/folate, procal, CRP ordered    Diabetes mellitus, type 2  - On home meds  - Lantus 10U daily   - Low sliding scale   - A1C 7.9    HAYLEY on CKD Stage 3a  - GFR normally around 50  - Creatinine 3-> 2.19  - Baseline creatinine 1.1     KVNG  - Does not wear CPAP     DVT prophylaxis: Heparin     Pending SNF placement     Jackie Branham MD  6/6/2022  7:38 AM     Attending Physician Statement  I have discussed the care of [facility] [address] and I have examined the patient myselft and taken ros and hpi , including pertinent history and exam findings,  with the resident. I have reviewed the key elements of all parts of the encounter with the resident. I agree with the assessment, plan and orders as documented by the resident.   Back pain with polypharmacy  On flexeril  And opioids  Hypotensive and tachy with ams  transfer to icu  Now improved  But not at baseline  moribid obese  Mri rev  No fracture      Electronically signed by Eagle Seo MD

## 2022-06-06 NOTE — PLAN OF CARE
Problem: Discharge Planning  Goal: Discharge to home or other facility with appropriate resources  Outcome: Progressing     Problem: Safety - Adult  Goal: Free from fall injury  Outcome: Progressing  Flowsheets (Taken 6/5/2022 2121)  Free From Fall Injury: Instruct family/caregiver on patient safety     Problem: Pain  Goal: Verbalizes/displays adequate comfort level or baseline comfort level  Outcome: Progressing     Problem: Chronic Conditions and Co-morbidities  Goal: Patient's chronic conditions and co-morbidity symptoms are monitored and maintained or improved  Outcome: Progressing     Problem: ABCDS Injury Assessment  Goal: Absence of physical injury  Outcome: Progressing  Flowsheets (Taken 6/5/2022 2121)  Absence of Physical Injury: Implement safety measures based on patient assessment

## 2022-06-06 NOTE — FLOWSHEET NOTE
Patient, who is known to writer, appears to be mildly confused and struggles to voice her thoughts in a logical manner. She seems to be unsure about why she's in ICU and expresses frustration with \"all the lines everywhere. \" She thinks she's going to rehab today and was unhappy when Drake Strickland suggested that was probably not happening today. Prayer did not have its normal calming impact on patient. 06/06/22 1531   Encounter Summary   Encounter Overview/Reason  Spiritual/Emotional Needs   Service Provided For: Patient   Referral/Consult From: Manish   Last Encounter  06/06/22   Complexity of Encounter Moderate   Spiritual/Emotional needs   Type Spiritual Support;Emotional Distress   Assessment/Intervention/Outcome   Assessment Anxious; Impaired resilience   Intervention Active listening;Discussed illness injury and its impact; Explored/Affirmed feelings, thoughts, concerns;Nurtured Hope;Prayer (assurance of)/[place];Sustaining Presence/Ministry of presence   Outcome Engaged in conversation;Expressed feelings, needs, and concerns;Expressed Gratitude;Venting emotion

## 2022-06-06 NOTE — CARE COORDINATION
SW attempted to notify patient that [facility] can accept patient, and that her other two choices could not. Patient's nurse was in her room providing care. SW will stop by again at a later time.      Electronically signed by Jeronimo Anne on 6/6/22 at 2:09 PM EDT

## 2022-06-06 NOTE — PLAN OF CARE
Problem: Discharge Planning  Goal: Discharge to home or other facility with appropriate resources  Outcome: Progressing  Flowsheets  Taken 6/6/2022 1200  Discharge to home or other facility with appropriate resources: Identify barriers to discharge with patient and caregiver  Taken 6/6/2022 0800  Discharge to home or other facility with appropriate resources: Identify barriers to discharge with patient and caregiver     Problem: Safety - Adult  Goal: Free from fall injury  Outcome: Progressing     Problem: Pain  Goal: Verbalizes/displays adequate comfort level or baseline comfort level  Outcome: Progressing  Flowsheets  Taken 6/6/2022 1200  Verbalizes/displays adequate comfort level or baseline comfort level: Encourage patient to monitor pain and request assistance  Taken 6/6/2022 0800  Verbalizes/displays adequate comfort level or baseline comfort level: Encourage patient to monitor pain and request assistance     Problem: ABCDS Injury Assessment  Goal: Absence of physical injury  Outcome: Progressing

## 2022-06-06 NOTE — CONSULTS
NEPHROLOGY CONSULT     Patient :  Samira Romero; 65 y.o. MRN# [id number]  Location:    Attending:  Moshe Marshall MD  Admit Date:  2022   Hospital Day: 0      Reason for Consult: Acute kidney injury      Chief Complaint: Follow back pain  History Obtained From: Patient, EMR    History of Present Illness: This is a 65 y.o. female past medical history of cervical spinal lysis, history of essential hypertension, history of Benetta Balk, type 2 diabetes presented to the hospital on 2022 with complaints of fall and back pain going to the legs  . Serum creatinine on admission was 1.5 mg/dL which improved to 1.1 mg/dL on 2022-----------> serum creatinine on 2022 peaked 3.5mg/dL  Patient was noted to be hypotensive on 2022  Patient also required Bourgeois catheter placement because of urinary retention  Nephrology consulted for evaluation management of acute kidney injury    Patient is was using NSAIDs. There has been no recent exposure to IV contrast.   There is no history  of paraprotein disease. Pt denies any history of recurrent UTI or kidney stones. Medication review shows use of ACE-inhibitor/diuretics.   Chlorthalidone and lisinopril were stopped    Past Medical History:        Diagnosis Date    Cervical spondylosis 2009    c-4 c-5, c-5 c-6, c-6 c-7    Generalized anxiety disorder 2020    Hyperlipidemia     Hypertension     Lumbar radiculopathy     DIEGO (nonalcoholic steatohepatitis) 10/12/2014    Obesity     Osteoarthritis of left knee 2014    Restless legs syndrome     Type II or unspecified type diabetes mellitus without mention of complication, not stated as uncontrolled        Past Surgical History:        Procedure Laterality Date     SECTION      COLONOSCOPY  2012    st jay    HYSTERECTOMY, TOTAL ABDOMINAL  2008    Polymenorrhagia    SHOULDER SURGERY Left 2021     SHOULDER MANIPULATION WITH PRE OP INTERSCALENE BLOCK     SHOULDER SURGERY Left 2/17/2021    SHOULDER MANIPULATION WITH PRE OP INTERSCALENE BLOCK and intraop injection performed by Brissa Ward DO at [address]  07-    [facility]       Current Medications:    midodrine (PROAMATINE) tablet 10 mg, TID PRN  hydrALAZINE (APRESOLINE) injection 10 mg, Q6H PRN  gabapentin (NEURONTIN) capsule 100 mg, Daily  perflutren lipid microspheres (DEFINITY) injection 1.65 mg, ONCE PRN  0.9 % sodium chloride infusion, Continuous  heparin (porcine) injection 5,000 Units, 3 times per day  docusate sodium (COLACE) capsule 100 mg, BID  insulin glargine (LANTUS) injection vial 10 Units, Daily  [Held by provider] oxyCODONE (OXYCONTIN) extended release tablet 10 mg, 2 times per day  [Held by provider] oxyCODONE-acetaminophen (PERCOCET) 5-325 MG per tablet 1 tablet, Q6H PRN  [Held by provider] baclofen (LIORESAL) tablet 5 mg, TID  glucose chewable tablet 16 g, PRN  dextrose bolus 10% 125 mL, PRN   Or  dextrose bolus 10% 250 mL, PRN  glucagon (rDNA) injection 1 mg, PRN  dextrose 5 % solution, PRN  acetaminophen (TYLENOL) tablet 500 mg, Q6H PRN  albuterol sulfate  (90 Base) MCG/ACT inhaler 2 puff, Q4H PRN  aspirin EC tablet 81 mg, Daily  latanoprost (XALATAN) 0.005 % ophthalmic solution 1 drop, Nightly  [Held by provider] busPIRone (BUSPAR) tablet 10 mg, BID  [Held by provider] chlorthalidone (HYGROTON) tablet 25 mg, Daily  pantoprazole (PROTONIX) tablet 40 mg, QAM AC  [Held by provider] lisinopril (PRINIVIL;ZESTRIL) tablet 40 mg, Daily  [Held by provider] rOPINIRole (REQUIP) tablet 2 mg, Nightly  [Held by provider] rosuvastatin (CRESTOR) tablet 20 mg, Nightly  insulin lispro (HUMALOG) injection vial 0-6 Units, TID WC  insulin lispro (HUMALOG) injection vial 0-3 Units, Nightly  [Held by provider] LORazepam (ATIVAN) tablet 0.5 mg, Nightly PRN        Allergies:  Codeine    Social History:   Social History     Socioeconomic History    Marital status:      Spouse name: Not on file    Number of children: Not on file    Years of education: Not on file    Highest education level: Not on file   Occupational History    Not on file   Tobacco Use    Smoking status: Never Smoker    Smokeless tobacco: Never Used   Vaping Use    Vaping Use: Never used   Substance and Sexual Activity    Alcohol use: Yes     Alcohol/week: 0.0 standard drinks     Comment: rarely/ 4 times a year    Drug use: No    Sexual activity: Yes   Other Topics Concern    Not on file   Social History Narrative    Not on file     Social Determinants of Health     Financial Resource Strain: Low Risk     Difficulty of Paying Living Expenses: Not very hard   Food Insecurity: No Food Insecurity    Worried About Running Out of Food in the Last Year: Never true    Valerie of Food in the Last Year: Never true   Transportation Needs:     Lack of Transportation (Medical): Not on file    Lack of Transportation (Non-Medical):  Not on file   Physical Activity:     Days of Exercise per Week: Not on file    Minutes of Exercise per Session: Not on file   Stress:     Feeling of Stress : Not on file   Social Connections:     Frequency of Communication with Friends and Family: Not on file    Frequency of Social Gatherings with Friends and Family: Not on file    Attends [employer] Services: Not on file    Active Member of [address] [place] [address] [employer] or Organizations: Not on file    Attends Club or Organization Meetings: Not on file    Marital Status: Not on file   Intimate Partner Violence:     Fear of Current or Ex-Partner: Not on file    Emotionally Abused: Not on file    Physically Abused: Not on file    Sexually Abused: Not on file   Housing Stability:     Unable to Pay for Housing in the Last Year: Not on file    Number of [place] in the Last Year: Not on file    Unstable Housing in the Last Year: Not on file       Family History:   Family History   Problem Relation Age of Onset    Heart Attack Mother     Diabetes Mother     Diabetes Father     Heart Attack Father        Review of Systems:    Constitutional: No fever, no chills, no night sweats, fatigue, generalized weakness, loss of appetite  HEENT:  No headache, otalgia, itchy eyes, epistaxis, nasal discharge or sore throat. Cardiac:  No chest pain, dyspnea, orthopnea or PND, palpitations  Chest:  No cough, hemoptysis, pleuritic chest pain, wheezing,SOB  Abdomen:  No abdominal pain, nausea, vomiting, diarrhea, melena, dysphagia hematemesis,constipation, abdominal bloating, flank pain  Neuro:  No CVA, TIA or seizure like activity. Skin:   No rashes, no itching. :   No hematuria, no pyuria, no dysuria, no flank pain. Extremities:  Neck pain, back pain      Objective:  CURRENT TEMPERATURE:  Temp: 98.6 °F (37 °C)  MAXIMUM TEMPERATURE OVER 24HRS:  Temp (24hrs), Av.5 °F (36.9 °C), Min:98.4 °F (36.9 °C), Max:98.6 °F (37 °C)    CURRENT RESPIRATORY RATE:  Resp: 16  CURRENT PULSE:  Heart Rate: (!) 116  CURRENT BLOOD PRESSURE:  BP: 111/61  24HR BLOOD PRESSURE RANGE:  Systolic (40YWL), CAB:31 , Min:75 , SGL:340   ; Diastolic (35PVH), RQB:60, Min:34, Max:80    24HR INTAKE/OUTPUT:      Intake/Output Summary (Last 24 hours) at 2022 1738  Last data filed at 2022 1200  Gross per 24 hour   Intake 2108.39 ml   Output 4100 ml   Net -1991.61 ml     Patient Vitals for the past 96 hrs (Last 3 readings):   Weight   22 0407 216 lb 7.9 oz (98.2 kg)       Physical Exam:  GENERAL APPEARANCE: Alert and cooperative, and appears to be in no acute distress. HEAD: normocephalic  EYES:  EOMI. Not pale, anicteric   NOSE:  No nasal discharge. CARDIAC: Normal S1 and S2. No S3, S4 or murmurs. Rhythm is regular. LUNGS: Clear to auscultation and percussion without rales, rhonchi, wheezing or diminished breath sounds. GI-soft nontender  MUSKULOSKELETAL: Adequately aligned spine. No joint erythema or tenderness. EXTREMITIES: No edema.  Peripheral pulses intact. NEURO: Nonfocal      Labs:   CBC:  Recent Labs     06/05/22  0944 06/06/22  0336   WBC 15.6* 11.7*   RBC 4.60 4.14   HGB 13.1 12.0   HCT 40.7 37.4   MCV 88.5 90.3   MCH 28.6 28.9   MCHC 32.3 32.1   RDW 13.6 13.6    235   MPV 8.3 8.4      BMP:   Recent Labs     06/05/22  0944 06/05/22  1527 06/06/22  0336   * 135 138   K 5.3 5.0 4.8   CL 97* 98 106   CO2 17* 21 18*   BUN 87* 91* 80*   CREATININE 3.08* 3.53* 2.19*   GLUCOSE 303* 265* 245*   CALCIUM 8.9 8.9 8.7      Phosphorus:  No results for input(s): PHOS in the last 72 hours. Magnesium: No results for input(s): MG in the last 72 hours. Albumin: No results for input(s): LABALBU in the last 72 hours. IRON:  No results for input(s): IRON in the last 72 hours. Iron Saturation:  Invalid input(s): PERCENTFE  TIBC:  No results for input(s): TIBC in the last 72 hours. FERRITIN:  No results for input(s): FERRITIN in the last 72 hours. SPEP: No results for input(s): SPEP in the last 72 hours. No results for input(s): PROT, ALBCAL, ALBCAL, ALBPCT, LABALPH, A1PCT, LABALPH, A2PCT, LABBETA, BETAPCT, GAMGLOB, GGPCT, PATH in the last 72 hours. UPEP: No results for input(s): TPU in the last 72 hours. Urine Sodium:    Recent Labs     06/05/22 2121   SHANTELLE 27      Urine Potassium:   Recent Labs     06/05/22 2121   KUR 39.4     Urine Chloride:  No results for input(s): CLU in the last 72 hours. Urine Ph:  Invalid input(s): PO4U  Urine Osmolarity:   Recent Labs     06/05/22 2121   OSMOU 335     Urine Creatinine:    Recent Labs     06/05/22 2121   LABCREA 145.5     Urine Eosinophils: Invalid input(s): EOSU  Urine Protein:  No results for input(s): TPU in the last 72 hours.   Urinalysis:    Recent Labs     06/05/22  1328   NITRU NEGATIVE   COLORU Yellow   PHUR 5.0   WBCUA 6 TO 9   RBCUA 0 TO 2   BACTERIA FEW*   SPECGRAV 1.016   LEUKOCYTESUR NEGATIVE   UROBILINOGEN Normal   BILIRUBINUR NEGATIVE   GLUCOSEU NEGATIVE   KETUA NEGATIVE Radiology:  Reviewed as available. Assessment:  1. HAYLEY nonoliguric most likely secondary to prerenal azotemia versus ATN due to hypotension use of chlorthalidone and lisinopril, urine sodium 27, urine analysis UA unremarkable except for trace hemoglobin trace protein    2. Urinary retention status post Bourgeois catheter placement    3. Hypotension improved continue IV fluids    4. Type 2 diabetes    5. CKD stage IIIa baseline creatinine 1.0 to 1.1 mg/dL secondary to diabetic/hypertensive nephropathy      2. Plan:  Continue IV fluids   continue to hold chlorthalidone and lisinopril  BMP daily  Strict I's and  Avoid hypotension, avoid nephrotoxic agent  Avoid NSAIDs  kidney ultrasound    Thank you for the consultation.       Electronically signed by MD Annalee on 6/6/2022 at 5:38 PM

## 2022-06-07 ENCOUNTER — APPOINTMENT (OUTPATIENT)
Dept: ULTRASOUND IMAGING | Age: 62
DRG: 551 | End: 2022-06-07
Payer: COMMERCIAL

## 2022-06-07 PROBLEM — E66.812 CLASS 2 OBESITY IN ADULT: Status: ACTIVE | Noted: 2022-06-07

## 2022-06-07 PROBLEM — E66.9 CLASS 2 OBESITY IN ADULT: Status: ACTIVE | Noted: 2022-06-07

## 2022-06-07 LAB
ANION GAP SERPL CALCULATED.3IONS-SCNC: 11 MMOL/L (ref 9–17)
BUN BLDV-MCNC: 41 MG/DL (ref 8–23)
CALCIUM SERPL-MCNC: 8.9 MG/DL (ref 8.6–10.4)
CHLORIDE BLD-SCNC: 109 MMOL/L (ref 98–107)
CO2: 20 MMOL/L (ref 20–31)
CREAT SERPL-MCNC: 1.02 MG/DL (ref 0.5–0.9)
GFR AFRICAN AMERICAN: >60 ML/MIN
GFR NON-AFRICAN AMERICAN: 55 ML/MIN
GFR SERPL CREATININE-BSD FRML MDRD: ABNORMAL ML/MIN/{1.73_M2}
GLUCOSE BLD-MCNC: 198 MG/DL (ref 65–105)
GLUCOSE BLD-MCNC: 212 MG/DL (ref 65–105)
GLUCOSE BLD-MCNC: 216 MG/DL (ref 65–105)
GLUCOSE BLD-MCNC: 231 MG/DL (ref 65–105)
GLUCOSE BLD-MCNC: 241 MG/DL (ref 70–99)
HCT VFR BLD CALC: 33 % (ref 36–46)
HEMOGLOBIN: 10.6 G/DL (ref 12–16)
MCH RBC QN AUTO: 28.2 PG (ref 26–34)
MCHC RBC AUTO-ENTMCNC: 32 G/DL (ref 31–37)
MCV RBC AUTO: 88.1 FL (ref 80–100)
PDW BLD-RTO: 13.3 % (ref 11.5–14.9)
PLATELET # BLD: 277 K/UL (ref 150–450)
PMV BLD AUTO: 8 FL (ref 6–12)
POTASSIUM SERPL-SCNC: 4.7 MMOL/L (ref 3.7–5.3)
RBC # BLD: 3.75 M/UL (ref 4–5.2)
SODIUM BLD-SCNC: 140 MMOL/L (ref 135–144)
TOTAL CK: 5076 U/L (ref 26–192)
WBC # BLD: 10.7 K/UL (ref 3.5–11)

## 2022-06-07 PROCEDURE — 6360000002 HC RX W HCPCS: Performed by: INTERNAL MEDICINE

## 2022-06-07 PROCEDURE — 6370000000 HC RX 637 (ALT 250 FOR IP): Performed by: INTERNAL MEDICINE

## 2022-06-07 PROCEDURE — 36415 COLL VENOUS BLD VENIPUNCTURE: CPT

## 2022-06-07 PROCEDURE — 83835 ASSAY OF METANEPHRINES: CPT

## 2022-06-07 PROCEDURE — 82550 ASSAY OF CK (CPK): CPT

## 2022-06-07 PROCEDURE — 80048 BASIC METABOLIC PNL TOTAL CA: CPT

## 2022-06-07 PROCEDURE — 97530 THERAPEUTIC ACTIVITIES: CPT

## 2022-06-07 PROCEDURE — 6370000000 HC RX 637 (ALT 250 FOR IP): Performed by: STUDENT IN AN ORGANIZED HEALTH CARE EDUCATION/TRAINING PROGRAM

## 2022-06-07 PROCEDURE — 76770 US EXAM ABDO BACK WALL COMP: CPT

## 2022-06-07 PROCEDURE — 1200000000 HC SEMI PRIVATE

## 2022-06-07 PROCEDURE — 82947 ASSAY GLUCOSE BLOOD QUANT: CPT

## 2022-06-07 PROCEDURE — 2580000003 HC RX 258: Performed by: STUDENT IN AN ORGANIZED HEALTH CARE EDUCATION/TRAINING PROGRAM

## 2022-06-07 PROCEDURE — 6370000000 HC RX 637 (ALT 250 FOR IP)

## 2022-06-07 PROCEDURE — 99233 SBSQ HOSP IP/OBS HIGH 50: CPT | Performed by: INTERNAL MEDICINE

## 2022-06-07 PROCEDURE — 85027 COMPLETE CBC AUTOMATED: CPT

## 2022-06-07 RX ORDER — OXYCODONE HYDROCHLORIDE AND ACETAMINOPHEN 5; 325 MG/1; MG/1
1 TABLET ORAL EVERY 6 HOURS PRN
Status: DISCONTINUED | OUTPATIENT
Start: 2022-06-07 | End: 2022-06-10 | Stop reason: HOSPADM

## 2022-06-07 RX ORDER — GABAPENTIN 300 MG/1
300 CAPSULE ORAL 3 TIMES DAILY
Status: DISCONTINUED | OUTPATIENT
Start: 2022-06-07 | End: 2022-06-10 | Stop reason: HOSPADM

## 2022-06-07 RX ORDER — INSULIN GLARGINE 100 [IU]/ML
12 INJECTION, SOLUTION SUBCUTANEOUS DAILY
Status: DISCONTINUED | OUTPATIENT
Start: 2022-06-07 | End: 2022-06-10 | Stop reason: HOSPADM

## 2022-06-07 RX ADMIN — INSULIN LISPRO 1 UNITS: 100 INJECTION, SOLUTION INTRAVENOUS; SUBCUTANEOUS at 22:02

## 2022-06-07 RX ADMIN — ACETAMINOPHEN 500 MG: 500 TABLET ORAL at 07:07

## 2022-06-07 RX ADMIN — GABAPENTIN 300 MG: 300 CAPSULE ORAL at 20:43

## 2022-06-07 RX ADMIN — GABAPENTIN 100 MG: 100 CAPSULE ORAL at 08:04

## 2022-06-07 RX ADMIN — ASPIRIN 81 MG: 81 TABLET, COATED ORAL at 08:04

## 2022-06-07 RX ADMIN — DOCUSATE SODIUM 100 MG: 100 CAPSULE, LIQUID FILLED ORAL at 08:04

## 2022-06-07 RX ADMIN — HEPARIN SODIUM 5000 UNITS: 5000 INJECTION INTRAVENOUS; SUBCUTANEOUS at 22:01

## 2022-06-07 RX ADMIN — SODIUM CHLORIDE: 9 INJECTION, SOLUTION INTRAVENOUS at 00:05

## 2022-06-07 RX ADMIN — INSULIN GLARGINE 12 UNITS: 100 INJECTION, SOLUTION SUBCUTANEOUS at 08:04

## 2022-06-07 RX ADMIN — PANTOPRAZOLE SODIUM 40 MG: 40 TABLET, DELAYED RELEASE ORAL at 05:51

## 2022-06-07 RX ADMIN — OXYCODONE HYDROCHLORIDE AND ACETAMINOPHEN 1 TABLET: 5; 325 TABLET ORAL at 23:03

## 2022-06-07 RX ADMIN — INSULIN LISPRO 2 UNITS: 100 INJECTION, SOLUTION INTRAVENOUS; SUBCUTANEOUS at 18:30

## 2022-06-07 RX ADMIN — INSULIN LISPRO 2 UNITS: 100 INJECTION, SOLUTION INTRAVENOUS; SUBCUTANEOUS at 08:42

## 2022-06-07 RX ADMIN — HEPARIN SODIUM 5000 UNITS: 5000 INJECTION INTRAVENOUS; SUBCUTANEOUS at 13:24

## 2022-06-07 RX ADMIN — LATANOPROST 1 DROP: 50 SOLUTION OPHTHALMIC at 21:01

## 2022-06-07 RX ADMIN — INSULIN LISPRO 1 UNITS: 100 INJECTION, SOLUTION INTRAVENOUS; SUBCUTANEOUS at 13:21

## 2022-06-07 RX ADMIN — DOCUSATE SODIUM 100 MG: 100 CAPSULE, LIQUID FILLED ORAL at 20:43

## 2022-06-07 RX ADMIN — ACETAMINOPHEN 500 MG: 500 TABLET ORAL at 13:23

## 2022-06-07 RX ADMIN — HEPARIN SODIUM 5000 UNITS: 5000 INJECTION INTRAVENOUS; SUBCUTANEOUS at 05:51

## 2022-06-07 RX ADMIN — ACETAMINOPHEN 500 MG: 500 TABLET ORAL at 20:43

## 2022-06-07 RX ADMIN — GABAPENTIN 300 MG: 300 CAPSULE ORAL at 13:23

## 2022-06-07 RX ADMIN — SODIUM CHLORIDE: 9 INJECTION, SOLUTION INTRAVENOUS at 19:37

## 2022-06-07 RX ADMIN — BUSPIRONE HYDROCHLORIDE 10 MG: 10 TABLET ORAL at 20:43

## 2022-06-07 ASSESSMENT — PAIN DESCRIPTION - LOCATION
LOCATION: BACK
LOCATION: BACK;HIP
LOCATION: BACK;HIP

## 2022-06-07 ASSESSMENT — ENCOUNTER SYMPTOMS
DIARRHEA: 0
ABDOMINAL PAIN: 0
NAUSEA: 0
ABDOMINAL DISTENTION: 0
BACK PAIN: 1
SHORTNESS OF BREATH: 0
VOMITING: 0
CONSTIPATION: 0

## 2022-06-07 ASSESSMENT — PAIN SCALES - GENERAL
PAINLEVEL_OUTOF10: 10
PAINLEVEL_OUTOF10: 7
PAINLEVEL_OUTOF10: 8
PAINLEVEL_OUTOF10: 7

## 2022-06-07 ASSESSMENT — PAIN DESCRIPTION - DESCRIPTORS
DESCRIPTORS: THROBBING
DESCRIPTORS: THROBBING;STABBING
DESCRIPTORS: SHARP

## 2022-06-07 ASSESSMENT — PAIN DESCRIPTION - PAIN TYPE: TYPE: ACUTE PAIN

## 2022-06-07 ASSESSMENT — PAIN DESCRIPTION - ORIENTATION
ORIENTATION: LOWER;MID
ORIENTATION: MID;LOWER
ORIENTATION: LEFT

## 2022-06-07 ASSESSMENT — PAIN - FUNCTIONAL ASSESSMENT: PAIN_FUNCTIONAL_ASSESSMENT: PREVENTS OR INTERFERES WITH ALL ACTIVE AND SOME PASSIVE ACTIVITIES

## 2022-06-07 ASSESSMENT — PAIN DESCRIPTION - ONSET: ONSET: ON-GOING

## 2022-06-07 ASSESSMENT — PAIN DESCRIPTION - FREQUENCY: FREQUENCY: CONTINUOUS

## 2022-06-07 NOTE — PROGRESS NOTES
Patient voids for first time post leigh removal, up to bathroom with one assist.. Unmeasured occurrence, patient states she went \"quite a bit. \" Writer places hat in toilet for accurate monitoring.

## 2022-06-07 NOTE — PROGRESS NOTES
Physician Progress Note      PATIENT:               Megan Fish  CSN #:                  [id number]  :                       1960  ADMIT DATE:       2022 5:16 AM  DISCH DATE:  RESPONDING  PROVIDER #:        Ashley Collier MD          QUERY TEXT:    Pt admitted with back pain and noted to have AMS. If possible, please document   in the progress notes and discharge summary if you are evaluating and / or   treating any of the following: The medical record reflects the following:  Risk Factors: DIEGO, pain  Clinical Indicators: AMS, oriented x4 on arrival, then oriented to time,   place, person after multiple attempts, delayed responses with poor   concentration per assessment  Treatment: Supportive, labs, imaging,  Options provided:  -- Metabolic encephalopathy  -- Hepatic encephalopathy acute  -- Hepatic encephalopathy chronic  -- Toxic encephalopathy  -- Toxic metabolic encephalopathy  -- Delirium due to, Please specify cause. -- Delirium  -- Other - I will add my own diagnosis  -- Disagree - Not applicable / Not valid  -- Disagree - Clinically unable to determine / Unknown  -- Refer to Clinical Documentation Reviewer    PROVIDER RESPONSE TEXT:    This patient has metabolic encephalopathy.     Query created by: Augustine Zavala on 2022 11:15 AM      Electronically signed by:  Ashley Collier MD 2022 11:27 AM

## 2022-06-07 NOTE — PROGRESS NOTES
Occupational Therapy  Facility/Department: [facility] MED SURG  Daily Treatment Note  NAME: Damien Bradley  : 1960  MRN: [id number]    Date of Service: 2022    Discharge Recommendations:  Patient would benefit from continued therapy after discharge         Patient Diagnosis(es): The primary encounter diagnosis was Sciatica of left side. A diagnosis of Low back pain with sciatica, sciatica laterality unspecified, unspecified back pain laterality, unspecified chronicity was also pertinent to this visit. Assessment    Activity Tolerance: Patient limited by pain; Patient limited by endurance  Discharge Recommendations: Patient would benefit from continued therapy after discharge      Plan   Plan  Times per Week: 4-5  Times per Day: Twice a day (1-2 times daily)  Current Treatment Recommendations: ROM;Balance training;Strengthening; Functional mobility training; Endurance training;Equipment evaluation, education, & procurement;Patient/Caregiver education & training;Pain management;Self-Care / ADL; Home management training     Restrictions       Subjective   Subjective  Subjective: \"It just won't go away\" Pt was qutie emotional the duration of the session due to severe pain, debilitating            Objective    Vitals     Bed Mobility Training  Bed Mobility Training: Yes  Overall Level of Assistance: Stand-by assistance  Interventions: Safety awareness training;Verbal cues; Tactile cues  Rolling: Stand-by assistance  Supine to Sit: Stand-by assistance  Sit to Supine: Contact-guard assistance  Scooting: Contact-guard assistance  Balance  Sitting - Static: Good (unsupported)  Sitting - Dynamic: Fair (occasional)  Standing: With support  Standing - Static: Fair  Standing - Dynamic: Poor (Forward flexion, difficulty bearing weight LLE)  Transfer Training  Transfer Training: Yes  Interventions: Manual cues; Safety awareness training;Verbal cues; Weight shifting training/pressure relief  Sit to Stand: Contact-guard assistance  Stand to Sit: Contact-guard assistance  Gait  Overall Level of Assistance: Minimum assistance  Interventions: Safety awareness training; Tactile cues; Weight shifting training/pressure relief  Distance (ft): 2 Feet (Side steps towards [facility])  Assistive Device: Walker, rolling     ADL  Feeding: Setup  Grooming: Setup  UE Bathing: Moderate assistance  LE Bathing: Dependent/Total  UE Dressing: Moderate assistance  LE Dressing: Maximum assistance  Toileting: Maximum assistance  Additional Comments: ADL scores based on skilled observation and clinical reasoning, unless otherwise noted. Assistance required due to severe pain, limiting mobility and independence with self care              Patient Education  Education Given To: Patient  Education Provided: Role of Therapy;Plan of Care  Education Provided Comments: OT discussed pain management at length. Encouraged pt to consider looking into relaxation/mediation videos on YouTube, pt verbalized understanding. Pt also complained of numbness in L 4th and 5th digits. OT educated/demostrated ulnar nerve glides to facilitate smooth movements  Education Method: Verbal;Demonstration; Teach Back  Barriers to Learning: Other (Comment) (Pain)  Education Outcome: Continued education needed    Goals  Short Term Goals  Time Frame for Short term goals: 1 week  Short Term Goal 1: mod indep LE bathe and dress  Short Term Goal 2: mod indep amb to obtain set up for adls with fair pain control  Short Term Goal 3: carter 6-8 min stand, ambulate for adls with fair pain control  Short Term Goal 4: indep with UE HEP for LUE ROM and BUE strengthening (when tolerated)  Short Term Goal 5: pt to verbalize understanding of bathroom equipment which may be helpful to her.   Patient Goals   Patient goals : return to prior level of indep with adls including work and amb without cane       Therapy Time   Individual Concurrent Group Co-treatment   Time In 1455         Time Out 1528         Minutes 33 Timed Code Treatment Minutes: [address] [facility] , OT

## 2022-06-07 NOTE — PLAN OF CARE
Problem: Pain  Goal: Verbalizes/displays adequate comfort level or baseline comfort level  6/6/2022 1820 by Leona Mann RN  Outcome: Progressing  Flowsheets  Taken 6/6/2022 1200  Verbalizes/displays adequate comfort level or baseline comfort level: Encourage patient to monitor pain and request assistance  Taken 6/6/2022 0800  Verbalizes/displays adequate comfort level or baseline comfort level: Encourage patient to monitor pain and request assistance     Problem: Chronic Conditions and Co-morbidities  Goal: Patient's chronic conditions and co-morbidity symptoms are monitored and maintained or improved  Outcome: Progressing

## 2022-06-07 NOTE — PLAN OF CARE
Assisted patient to bathroom, she had extremely large BM with red streaks, assisted to shower, bathed and then returned to chair.

## 2022-06-07 NOTE — PROGRESS NOTES
Physical Therapy        Physical Therapy Cancel Note      DATE: 2022    NAME: Farhad Bishop  MRN: [id number]   : 1960      Patient not seen this date for Physical Therapy due to: Other: Attempted to see pt. Pt unavailable with other disciplinary.  Will check back as time allows      Electronically signed by Tim Munoz PTA on 2022 at 2:47 PM

## 2022-06-07 NOTE — PROGRESS NOTES
NEPHROLOGY progress note    Patient :  Farhana Diaz; 65 y.o. MRN# [id number]  Location:  2011/2011-01  Attending:  Lindy Parham MD  Admit Date:  5/31/2022   Hospital Day: 1      Reason for Consult: Acute kidney injury      Chief Complaint: Follow back pain      Subjective/interval history. Patient seen and examined doing better no acute events overnight. Blood pressure is stable. Kidney function improved serum creatinine improved to 1.0 mg/dL. Patient has a Bourgeois catheter draining well patient requesting for removal of Bourgeois catheter    History of Present Illness: This is a 65 y.o. female past medical history of cervical spinal lysis, history of essential hypertension, history of Leanne Royals, type 2 diabetes presented to the hospital on 5/31/2022 with complaints of fall and back pain going to the legs  . Serum creatinine on admission was 1.5 mg/dL which improved to 1.1 mg/dL on 6/2/2022-----------> serum creatinine on 6/5/2022 peaked 3.5mg/dL  Patient was noted to be hypotensive on 6/5/2022  Patient also required Bourgeois catheter placement because of urinary retention  Nephrology consulted for evaluation management of acute kidney injury    Patient is was using NSAIDs. There has been no recent exposure to IV contrast.   There is no history  of paraprotein disease. Pt denies any history of recurrent UTI or kidney stones. Medication review shows use of ACE-inhibitor/diuretics.   Chlorthalidone and lisinopril were stopped    Past Medical History:        Diagnosis Date    Cervical spondylosis 4/2009    c-4 c-5, c-5 c-6, c-6 c-7    Generalized anxiety disorder 12/8/2020    Hyperlipidemia     Hypertension     Lumbar radiculopathy     DIEGO (nonalcoholic steatohepatitis) 10/12/2014    Obesity     Osteoarthritis of left knee 8/19/2014    Restless legs syndrome     Type II or unspecified type diabetes mellitus without mention of complication, not stated as uncontrolled        Past Surgical History: Procedure Laterality Date     SECTION      COLONOSCOPY  2012    [facility]    HYSTERECTOMY, TOTAL ABDOMINAL  2008    Polymenorrhagia    SHOULDER SURGERY Left 2021     SHOULDER MANIPULATION WITH PRE OP INTERSCALENE BLOCK     SHOULDER SURGERY Left 2021    SHOULDER MANIPULATION WITH PRE OP INTERSCALENE BLOCK and intraop injection performed by Ollie Jaimes DO at [facility]  2012    [facility]       Current Medications:    insulin glargine (LANTUS) injection vial 12 Units, Daily  oxyCODONE-acetaminophen (PERCOCET) 5-325 MG per tablet 1 tablet, Q6H PRN  midodrine (PROAMATINE) tablet 10 mg, TID PRN  hydrALAZINE (APRESOLINE) injection 10 mg, Q6H PRN  gabapentin (NEURONTIN) capsule 100 mg, Daily  perflutren lipid microspheres (DEFINITY) injection 1.65 mg, ONCE PRN  0.9 % sodium chloride infusion, Continuous  heparin (porcine) injection 5,000 Units, 3 times per day  docusate sodium (COLACE) capsule 100 mg, BID  [Held by provider] oxyCODONE (OXYCONTIN) extended release tablet 10 mg, 2 times per day  [Held by provider] baclofen (LIORESAL) tablet 5 mg, TID  glucose chewable tablet 16 g, PRN  dextrose bolus 10% 125 mL, PRN   Or  dextrose bolus 10% 250 mL, PRN  glucagon (rDNA) injection 1 mg, PRN  dextrose 5 % solution, PRN  acetaminophen (TYLENOL) tablet 500 mg, Q6H PRN  albuterol sulfate  (90 Base) MCG/ACT inhaler 2 puff, Q4H PRN  aspirin EC tablet 81 mg, Daily  latanoprost (XALATAN) 0.005 % ophthalmic solution 1 drop, Nightly  [Held by provider] busPIRone (BUSPAR) tablet 10 mg, BID  [Held by provider] chlorthalidone (HYGROTON) tablet 25 mg, Daily  pantoprazole (PROTONIX) tablet 40 mg, QAM AC  [Held by provider] lisinopril (PRINIVIL;ZESTRIL) tablet 40 mg, Daily  [Held by provider] rOPINIRole (REQUIP) tablet 2 mg, Nightly  [Held by provider] rosuvastatin (CRESTOR) tablet 20 mg, Nightly  insulin lispro (HUMALOG) injection vial 0-6 Units, TID WC  insulin lispro (HUMALOG) injection vial 0-3 Units, Nightly        Allergies:  Codeine    Objective:  CURRENT TEMPERATURE:  Temp: 98.3 °F (36.8 °C)  MAXIMUM TEMPERATURE OVER 24HRS:  Temp (24hrs), Av.5 °F (36.9 °C), Min:98.3 °F (36.8 °C), Max:98.6 °F (37 °C)    CURRENT RESPIRATORY RATE:  Resp: 22  CURRENT PULSE:  Heart Rate: 98  CURRENT BLOOD PRESSURE:  BP: 122/85  24HR BLOOD PRESSURE RANGE:  Systolic (17RHU), QSP:587 , Min:84 , CSY:863   ; Diastolic (70SIN), ZUR:73, Min:49, Max:104    24HR INTAKE/OUTPUT:      Intake/Output Summary (Last 24 hours) at 2022 1041  Last data filed at 2022 0600  Gross per 24 hour   Intake 2840 ml   Output 6400 ml   Net -3560 ml     Patient Vitals for the past 96 hrs (Last 3 readings):   Weight   22 0407 216 lb 7.9 oz (98.2 kg)       Physical Exam:  GENERAL APPEARANCE: Alert and cooperative, and appears to be in no acute distress. HEAD: normocephalic  EYES:  EOMI. Not pale, anicteric   NOSE:  No nasal discharge. CARDIAC: Normal S1 and S2. No S3, S4 or murmurs. Rhythm is regular. LUNGS: Clear to auscultation and percussion without rales, rhonchi, wheezing or diminished breath sounds. GI-soft nontender  MUSKULOSKELETAL: Adequately aligned spine. No joint erythema or tenderness. EXTREMITIES: Trace edema. Peripheral pulses intact. NEURO: Nonfocal      Labs:   CBC:  Recent Labs     22  0944 22  0336 22  0448   WBC 15.6* 11.7* 10.7   RBC 4.60 4.14 3.75*   HGB 13.1 12.0 10.6*   HCT 40.7 37.4 33.0*   MCV 88.5 90.3 88.1   MCH 28.6 28.9 28.2   MCHC 32.3 32.1 32.0   RDW 13.6 13.6 13.3    235 277   MPV 8.3 8.4 8.0      BMP:   Recent Labs     22  1527 22  0336 22  0448    138 140   K 5.0 4.8 4.7   CL 98 106 109*   CO2 21 18* 20   BUN 91* 80* 41*   CREATININE 3.53* 2.19* 1.02*   GLUCOSE 265* 245* 241*   CALCIUM 8.9 8.7 8.9      Phosphorus:  No results for input(s): PHOS in the last 72 hours.   Magnesium: No results for input(s): MG in the last 72 hours. Albumin: No results for input(s): LABALBU in the last 72 hours. IRON:  No results for input(s): IRON in the last 72 hours. Iron Saturation:  Invalid input(s): PERCENTFE  TIBC:  No results for input(s): TIBC in the last 72 hours. FERRITIN:  No results for input(s): FERRITIN in the last 72 hours. SPEP: No results for input(s): SPEP in the last 72 hours. No results for input(s): PROT, ALBCAL, ALBCAL, ALBPCT, LABALPH, A1PCT, LABALPH, A2PCT, LABBETA, BETAPCT, GAMGLOB, GGPCT, PATH in the last 72 hours. UPEP: No results for input(s): TPU in the last 72 hours. Urine Sodium:    Recent Labs     06/05/22 2121   SHANTELLE 27      Urine Potassium:   Recent Labs     06/05/22 2121   KUR 39.4     Urine Chloride:  No results for input(s): CLU in the last 72 hours. Urine Ph:  Invalid input(s): PO4U  Urine Osmolarity:   Recent Labs     06/05/22 2121   OSMOU 335     Urine Creatinine:    Recent Labs     06/05/22 2121   LABCREA 145.5     Urine Eosinophils: Invalid input(s): EOSU  Urine Protein:  No results for input(s): TPU in the last 72 hours. Urinalysis:    Recent Labs     06/05/22  1328   NITRU NEGATIVE   COLORU Yellow   PHUR 5.0   WBCUA 6 TO 9   RBCUA 0 TO 2   BACTERIA FEW*   SPECGRAV 1.016   LEUKOCYTESUR NEGATIVE   UROBILINOGEN Normal   BILIRUBINUR NEGATIVE   GLUCOSEU NEGATIVE   [address] NEGATIVE         Radiology:  Reviewed as available. Assessment:  1. HAYLEY nonoliguric most likely secondary to prerenal azotemia versus ATN due to hypotension use of chlorthalidone and lisinopril, urine sodium 27, urine analysis UA unremarkable except for trace hemoglobin trace protein, kidney function is improved back to baseline serum creatinine is 1.0 mg/dL    2. Urinary retention status post Bourgeois catheter placement    3. Hypotension improved continue IV fluids    4. Type 2 diabetes    5.   CKD stage IIIa baseline creatinine 1.0 to 1.1 mg/dL secondary to diabetic/hypertensive nephropathy      2. Plan:  Continue IV fluids   continue to hold chlorthalidone and lisinopril  Void trial  BMP daily  Strict I's and  Avoid hypotension, avoid nephrotoxic agent  Avoid NSAIDs      Thank you for the consultation.       Electronically signed by Maria Esther Ferguson MD on 6/7/2022 at 10:41 AM

## 2022-06-07 NOTE — PROGRESS NOTES
[address] [facility] [employer]    PROGRESS NOTE             6/7/2022    7:08 AM    Name:   Maynard Brunner  MRN:     [id number]     Acct:      [de-identified]   Room:   2011/2011-01  IP Day:  1  Admit Date:  5/31/2022  5:16 AM    PCP:  Gordo Smith MD  Code Status:  Full Code    Subjective:     C/C:   Chief Complaint   Patient presents with    Back Pain     Interval History Status: improved. Seen and examined at bedside, no acute events overnight. Patients mentation has improved today. Did have urinary retention yesterday, Bourgeois catheter placed. Good urine output today. Was very tearful at bedside, stating that she is still having leg pain, primarily on the left side radiating through the buttocks and posterior left thigh. Neurontin was restarted at lower dose, restarting pain management cautiously due to drop in blood pressure previously. Review of Systems:     Review of Systems   Constitutional: Negative for chills and fever. Respiratory: Negative for shortness of breath. Cardiovascular: Negative for chest pain, palpitations and leg swelling. Gastrointestinal: Negative for abdominal distention, abdominal pain, constipation, diarrhea, nausea and vomiting. Genitourinary: Negative for dysuria. Musculoskeletal: Positive for back pain. Neurological: Negative for dizziness. Psychiatric/Behavioral: Negative for agitation. Medications: Allergies:     Allergies   Allergen Reactions    Codeine Nausea And Vomiting       Current Meds:   Scheduled Meds:    gabapentin  100 mg Oral Daily    heparin (porcine)  5,000 Units SubCUTAneous 3 times per day    docusate sodium  100 mg Oral BID    insulin glargine  10 Units SubCUTAneous Daily    [Held by provider] oxyCODONE  10 mg Oral 2 times per day    [Held by provider] baclofen  5 mg Oral TID    aspirin  81 mg Oral Daily    latanoprost  1 drop Both Eyes Nightly    [Held by provider] busPIRone  10 mg Oral BID    [Held by provider] chlorthalidone  25 mg Oral Daily    pantoprazole  40 mg Oral QAM AC    [Held by provider] lisinopril  40 mg Oral Daily    [Held by provider] rOPINIRole  2 mg Oral Nightly    [Held by provider] rosuvastatin  20 mg Oral Nightly    insulin lispro  0-6 Units SubCUTAneous TID WC    insulin lispro  0-3 Units SubCUTAneous Nightly     Continuous Infusions:    sodium chloride 75 mL/hr at 22 0005    dextrose       PRN Meds: midodrine, hydrALAZINE, perflutren lipid microspheres, [Held by provider] oxyCODONE-acetaminophen, glucose, dextrose bolus **OR** dextrose bolus, glucagon (rDNA), dextrose, acetaminophen, albuterol sulfate HFA, [Held by provider] LORazepam    Data:     Past Medical History:   has a past medical history of Cervical spondylosis, Generalized anxiety disorder, Hyperlipidemia, Hypertension, Lumbar radiculopathy, DIEGO (nonalcoholic steatohepatitis), Obesity, Osteoarthritis of left knee, Restless legs syndrome, and Type II or unspecified type diabetes mellitus without mention of complication, not stated as uncontrolled. Social History:   reports that she has never smoked. She has never used smokeless tobacco. She reports current alcohol use. She reports that she does not use drugs. Family History:   Family History   Problem Relation Age of Onset    Heart Attack Mother     Diabetes Mother     Diabetes Father     Heart Attack Father        Vitals:  /62   Pulse 92   Temp 98.3 °F (36.8 °C) (Oral)   Resp 15   Wt 216 lb 7.9 oz (98.2 kg)   SpO2 97%   BMI 39.60 kg/m²   Temp (24hrs), Av.5 °F (36.9 °C), Min:98.3 °F (36.8 °C), Max:98.6 °F (37 °C)    Recent Labs     22  1946 22  1140 22  1624 22   POCGLU 215* 239* 213* 212*       I/O(24Hr):     Intake/Output Summary (Last 24 hours) at 2022 0708  Last data filed at 2022 0600  Gross per 24 hour   Intake 2840 ml   Output 6400 ml   Net -3560 ml Labs:  [unfilled]    Lab Results   Component Value Date/Time    SPECIAL NOT REPORTED 10/07/2021 06:24 PM     Lab Results   Component Value Date/Time    CULTURE NO SIGNIFICANT GROWTH 10/07/2021 06:24 PM       [unfilled]    Radiology:    ECHO Complete 2D W Doppler W Color    Result Date: 6/6/2022  [facility] Transthoracic Echocardiography Report (TTE)  Patient Name Jennyfer Marquis Date of Study                 06/06/2022               LEE ANN CARRASQUILLO   Date of      1960  Gender                        Female  Birth   Age          65 year(s)  Race                             Room Number  2011   Corporate ID [id number]    Weight:                       216 pounds, 98 kg  #   Patient Dread [de-identified]  #   MR #         [id number]      Sonographer                   AniaMarisol   Accession #  [id number]  Interpreting Physician        Tawny Zelaya   Fellow                   Referring Nurse Practitioner   Interpreting             Referring Physician           Venu Lorenzo  Fellow                                                 Anastacia Huitron  Type of Study   TTE procedure:2D Echocardiogram, M-Mode, Doppler, Color Doppler. Procedure Date Date: 06/06/2022 Start: 02:10 PM Study Location: [facility] Technical Quality: Fair visualization Indications:Congestive heart failure. Patient Status: Inpatient Weight: 216 pounds Rhythm: Sinus tachycardia HR: 111 bpm BP: 111/61 mmHg CONCLUSIONS Summary Technically difficult study. Normal LV size. Hyperdynamic left ventricular function. Estimated LV EF 65-70 %. Normal wall thickness. No segmental wall motion abnormalities. Grade I (mild) left ventricular diastolic dysfunction. Normal right ventricular size and function. Left atrium is normal in size. Right atrium was not well visualized. Aortic valve was not well visualized. No aortic stenosis. No aortic insufficiency. Normal aortic root dimension. Normal mitral valve structure and function.  No significant mitral regurgitation. Tricuspid valve was not well visualized. Insignificant tricuspid regurgitation, unable to estimate RVSP. IVC not well visualized. No significant pericardial effusion is seen. Signature ----------------------------------------------------------------------------  Electronically signed by Tawny Zelaya(Interpreting physician) on  06/06/2022 05:07 PM ---------------------------------------------------------------------------- ----------------------------------------------------------------------------  Electronically signed by Marisol Jorge(Sonographer) on 06/06/2022 02:37  PM ---------------------------------------------------------------------------- FINDINGS Left Atrium Left atrium is normal in size. Left Ventricle Small LV cavity. Hyperdynamic left ventricular function. Estimated LV EF 65-70 %. Normal wall thickness. No segmental wall motion abnormalities. Grade I (mild) left ventricular diastolic dysfunction. Right Atrium Right atrium was not well visualized. Right Ventricle Normal right ventricular size and function. Mitral Valve Normal mitral valve structure and function. No significant mitral regurgitation. Aortic Valve Aortic valve was not well visualized. No aortic insufficiency. No aortic stenosis. Tricuspid Valve Tricuspid valve was not well visualized. Insignificant tricuspid regurgitation, unable to estimate RVSP. Pulmonic Valve Pulmonic valve not well visualized but Doppler velocities are normal. No pulmonic insufficiency. Pericardial Effusion No significant pericardial effusion is seen. Miscellaneous Normal aortic root dimension. E/e' average 7.4 IVC not well visualized.  M-mode / 2D Measurements & Calculations:   LVIDd:3.19 cm(3.7 - 5.6 cm)      Diastolic HWYXOX:11.0 ml  YWABC:3.38 cm(2.2 - 4.0 cm)      Systolic CKTFEH:0.70 ml  TZFA:3.48 cm(0.6 - 1.1 cm)       Aortic Root:2.4 cm(2.0 - 3.7 cm)  LVPWd:1.07 cm(0.6 - 1.1 cm)      LA Dimension: 2.9 cm(1.9 - 4.0 cm) Fractional Shortenin.81 %    LA volume/Index: 17.7 ml  Calculated LVEF (%): 78.22 %     LVOT:1.9 cm   Mitral:                              Aortic   Peak E-Wave: 0.61 m/s                Peak Velocity: 1.69 m/s  Peak A-Wave: 0.86 m/s                Mean Velocity: 1.22 m/s  E/A Ratio: 0.71                      Peak Gradient: 11.42 mmHg  Peak Gradient: 1.47 mmHg             Mean Gradient: 7 mmHg  Deceleration Time: 162 msec                                        Area (continuity): 2.44 cm^2                                       AV VTI: 27.1 cm  Septal Wall E' velocity:0.08 m/s Lateral Wall E' velocity:0.08 m/s    XR LUMBAR SPINE (2-3 VIEWS)    Result Date: 2022  EXAMINATION: THREE XRAY VIEWS OF THE LUMBAR SPINE 2022 4:27 pm COMPARISON: None. HISTORY: ORDERING SYSTEM PROVIDED HISTORY: Lower back Pain TECHNOLOGIST PROVIDED HISTORY: Lower back Pain Reason for Exam: PT CO acute lower back pain X several days with NKI per pt. FINDINGS: There is a mild dextroscoliosis of the lower lumbar spine. There are 5 lumbar segments There is mild anterior spurring of the visualized lumbar vertebral bodies There is moderate L5-S1 joint space compromise. The remaining disc spaces and vertical heights of the vertebral bodies are maintained There is no pedicular/pars interarticularis defect, fracture or dislocation seen     Scoliosis and L5-S1 degenerative changes     MRI LUMBAR SPINE WO CONTRAST    Result Date: 2022  EXAMINATION: MRI OF THE LUMBAR SPINE WITHOUT CONTRAST, 2022 12:46 pm TECHNIQUE: Multiplanar multisequence MRI of the lumbar spine was performed without the administration of intravenous contrast. COMPARISON: 2022 HISTORY: ORDERING SYSTEM PROVIDED HISTORY: lumbar left radicular pain FINDINGS: BONES/ALIGNMENT: There is loss of the normal lordotic curvature. The vertebral body heights are maintained. The bone marrow signal appears unremarkable. Mild degenerative disc height loss at L5-S1.   Mild multilevel facet arthropathy. SPINAL CORD: The conus terminates normally. SOFT TISSUES: No paraspinal mass identified. L1-L2: There is no significant disc herniation, spinal canal stenosis or neural foraminal narrowing. L2-L3: There is no significant disc herniation, spinal canal stenosis or neural foraminal narrowing. L3-L4: There is no significant disc herniation, spinal canal stenosis or neural foraminal narrowing. L4-L5: There is no significant disc herniation, spinal canal stenosis or neural foraminal narrowing. L5-S1: Disc bulging with asymmetric encroachment on the left lateral recess and abutment of the transversing left S1 nerve root. No significant foraminal stenosis. No significant change compared to MR lumbar spine from 03/22/2022. Disc bulging at L5-S1 with asymmetric encroachment on the left lateral recess and abutment of the transversing left S1 nerve root. XR CHEST PORTABLE    Result Date: 6/5/2022  EXAMINATION: ONE XRAY VIEW OF THE CHEST 6/5/2022 6:35 pm COMPARISON: 02/04/2021 HISTORY: ORDERING SYSTEM PROVIDED HISTORY: fever TECHNOLOGIST PROVIDED HISTORY: fever Reason for Exam: Fever FINDINGS: The cardiomediastinal silhouette is normal in size and contour. The lungs demonstrate mild bibasilar atelectasis versus scarring. No pleural effusion or pneumothorax is present. No acute cardiopulmonary process. Minimal bibasilar atelectasis         Physical Examination:        Physical Exam  Constitutional:       Appearance: She is obese. Cardiovascular:      Rate and Rhythm: Normal rate and regular rhythm. Pulses: Normal pulses. Heart sounds: Normal heart sounds. No murmur heard. No gallop. Pulmonary:      Effort: Pulmonary effort is normal.      Breath sounds: Normal breath sounds. No wheezing, rhonchi or rales. Abdominal:      General: Bowel sounds are normal. There is no distension. Palpations: Abdomen is soft. Tenderness: There is no abdominal tenderness. Musculoskeletal:      Right lower leg: No edema. Left lower leg: No edema. Skin:     General: Skin is warm. Neurological:      Mental Status: She is alert. Assessment:        Primary Problem  Back pain    Active Hospital Problems    Diagnosis Date Noted    Toxic metabolic encephalopathy [V17.1] 06/06/2022     Priority: Medium    Back pain [M54.9] 05/31/2022     Priority: Medium    Diabetes mellitus type 2, uncontrolled ([facility] [address] [E11.65] 10/12/2014    Dyslipidemia [E78.5] 10/12/2014    DIEGO (nonalcoholic steatohepatitis) [K75.81] 10/12/2014    Hypertension [I10]        Plan:        Acute on chronic lumbar back pain  - MRI 6/2 - L5-S1 disc bulging  - Given Medrol Dosepak, Baclofen, Morphine   - Oxycodone and Ativan held   - Neurontin started at lower dose  - Oxycodone 5mg q6h PRN   - Ortho consulted - no intervention at this time     Delirium   - CXR yesterday - bibasilar atelectasis   - UA neg  - Passed bedside swallow  - TSH, B12/folate, procal wnl  - No CO2 rentention per crit care  - Likely related to urinary retention      Diabetes mellitus, type 2  - On home meds  - Lantus increased to 12U daily   - Low sliding scale   - A1C 7.9    HAYLEY on CKD Stage 3a, resolved  - GFR normally around 50  - Creatinine 1.02  - Baseline creatinine 1.1      KVNG  - Does not wear CPAP    Obesity, class 2     DVT prophylaxis: Heparin      Pending SNF placement     Abel Nelson MD  6/7/2022  7:08 AM     Attending Physician Statement  I have discussed the care of [address] and I have examined the patient myselft and taken ros and hpi , including pertinent history and exam findings,  with the resident. I have reviewed the key elements of all parts of the encounter with the resident. I agree with the assessment, plan and orders as documented by the resident.   ot pt  Start buspar  And gabapentin dose to 300 tid  Avoid sedative no trazodone  No benzo  ot pt  Dc planning    Electronically signed by Rudy Reagan Alida Duane, MD

## 2022-06-07 NOTE — PROGRESS NOTES
Pulmonary Progress Note  [facility] Critical [facility] Special[facility]      Patient - Toñito Cheney,  Age - 65 y.o.    - 1960      Room Number -    MRN -  [id number]   [facility]  Date of Admission -  2022  5:16 AM        Elinor Rashid MD  Primary Care Physician - Warden Randy MD     SUBJECTIVE     The patient is alert, oriented, and seated in her chair upon interview. She appears in moderate distress and states that she is having sharp pain in the back of her thighs and buttox. The patient appears to be less confused today and she is oriented to person, place, and time. However, she still has delayed speech and thought processes. She denies shortness of breath, cough, and chest pain. She states that she was constipated, but finally had a bowel movement last night and she wants her catheter out. She says that she feels very tired and has had difficulty sleeping. OBJECTIVE   VITALS    weight is 216 lb 7.9 oz (98.2 kg). Her oral temperature is 98.3 °F (36.8 °C). Her blood pressure is 122/85 and her pulse is 98. Her respiration is 22 and oxygen saturation is 94%. Body mass index is 39.6 kg/m².   Temperature Range: Temp: 98.3 °F (36.8 °C) Temp  Av.5 °F (36.9 °C)  Min: 98.3 °F (36.8 °C)  Max: 98.6 °F (37 °C)  BP Range:  Systolic (37YVS), CH[place]:130 , Min:84 , TW     Diastolic (05DZE), SQA:89, Min:49, Max:104    Pulse Range: Pulse  Av.1  Min: 91  Max: 124  Respiration Range: Resp  Av.9  Min: 12  Max: 25  Current Pulse Ox[de-identified]  SpO2: 94 %  24HR Pulse Ox Range:  SpO2  Av.1 %  Min: 90 %  Max: 99 %  Oxygen Amount and Delivery: O2 Flow Rate (L/min): 1 L/min    Wt Readings from Last 3 Encounters:   22 216 lb 7.9 oz (98.2 kg)   22 202 lb (91.6 kg)   22 204 lb 6.4 oz (92.7 kg)       I/O (24 Hours)    Intake/Output Summary (Last 24 hours) at 2022 0856  Last data filed at 6/7/2022 0600  Gross per 24 hour   Intake 2840 ml   Output 6400 ml   Net -3560 ml       EXAM     General Appearance  Awake, alert, oriented, in no acute distress  HEENT - normocephalic, atraumatic.  []  Mallampati  [x] Crowded airway   [x] Macroglossia  []  Retrognathia  [] Micrognathia  []  Normal tongue size []  Normal Bite  [] Putnam sign positive    Neck - Supple,  trachea midline   Lungs - CTA bilaterally, no wheezes rales or ronchi  Cardiovascular - Heart sounds are normal.  Regular rate and rhythm   Abdomen - Soft, nontender, nondistended, no masses or organomegaly  Neurologic - Delayed speech and thought processes  Skin - No bruising or bleeding  Extremities - No clubbing, cyanosis, edema    MEDS      insulin glargine  12 Units SubCUTAneous Daily    gabapentin  100 mg Oral Daily    heparin (porcine)  5,000 Units SubCUTAneous 3 times per day    docusate sodium  100 mg Oral BID    [Held by provider] oxyCODONE  10 mg Oral 2 times per day    [Held by provider] baclofen  5 mg Oral TID    aspirin  81 mg Oral Daily    latanoprost  1 drop Both Eyes Nightly    [Held by provider] busPIRone  10 mg Oral BID    [Held by provider] chlorthalidone  25 mg Oral Daily    pantoprazole  40 mg Oral QAM AC    [Held by provider] lisinopril  40 mg Oral Daily    [Held by provider] rOPINIRole  2 mg Oral Nightly    [Held by provider] rosuvastatin  20 mg Oral Nightly    insulin lispro  0-6 Units SubCUTAneous TID WC    insulin lispro  0-3 Units SubCUTAneous Nightly      sodium chloride 75 mL/hr at 06/07/22 0005    dextrose       midodrine, hydrALAZINE, perflutren lipid microspheres, glucose, dextrose bolus **OR** dextrose bolus, glucagon (rDNA), dextrose, acetaminophen, albuterol sulfate HFA    LABS   CBC   Recent Labs     06/07/22  0448   WBC 10.7   HGB 10.6*   HCT 33.0*   MCV 88.1        BMP:   Lab Results   Component Value Date     06/07/2022    K 4.7 06/07/2022     06/07/2022    CO2 20 06/07/2022    BUN 41 06/07/2022    LABALBU 4.5 05/31/2022    LABALBU 4.1 05/19/2012    CREATININE 1.02 06/07/2022    CALCIUM 8.9 06/07/2022    GFRAA >60 06/07/2022    LABGLOM 55 06/07/2022     ABGs:No results found for: PHART, PO2ART, [id number] No results found for: IFIO2, MODE, SETTIDVOL, SETPEEP  Ionized Calcium:  No results found for: IONCA  Magnesium:    Lab Results   Component Value Date    MG 1.6 07/21/2021     Phosphorus:    Lab Results   Component Value Date    PHOS 3.1 07/21/2021        LIVER PROFILE No results for input(s): AST, ALT, LIPASE, BILIDIR, BILITOT, ALKPHOS in the last 72 hours. Invalid input(s): AMYLASE,  ALB  INR No results for input(s): INR in the last 72 hours. PTT   Lab Results   Component Value Date    APTT 25.7 07/23/2013         RADIOLOGY         (See actual reports for details)    ASSESSMENT/PLAN     Altered Mental Status improving  Morbid obesity  Possible-high suspicion for sleep apnea- no sleep study history. HTN- within normal limits  DM2- glucose remains elevated at 212. HAYLEY improving  Back and Leg Pain- persists despite Tylenol  Bedside swallow study within normal limits      Monitor O2 levels and assess need for BiPAP or CPAP  Lantus and sliding scale insulin for management of hyperglycemia  Monitor creatine and electrolytes  Hold opiates due to altered mental status  DVT prophylaxis with Heparin  Awaiting SNF placement    Electronically signed by Po Bustamante on 6/7/2022 at 8:53 AM    Patient seen and examined independently by me. Above discussed and I agree with medical student note except where indicated in the EMR revision history. Also see my additional comments and changes indicated by discrete font, text color, italics, and/or initials. Labs, cultures, and radiographs where available were reviewed. She seems to be much more alert and oriented today. Pain is also better controlled. Her creatinine is almost normal now.   She is asking for Bourgeois catheter to be out and IV fluids to be discontinued. No objection from my standpoint. She can be transferred to the medical surgical floor. She will need outpatient sleep study. I think it is okay to reintroduce her pain medications although I would do so at a lower dose.   Electronically signed by Kaina Ortega MD on 6/7/2022 at 11:49 AM

## 2022-06-07 NOTE — PROGRESS NOTES
Physical Therapy  Facility/Department: [facility] MED SURG  Daily Treatment Note  NAME: Mel Kaur  : 1960  MRN: [id number]    Date of Service: 2022    Discharge Recommendations:  Patient would benefit from continued therapy after discharge   PT Equipment Recommendations  Equipment Needed: Yes  Mobility Devices: [place]: Rolling    Patient Diagnosis(es): The primary encounter diagnosis was Sciatica of left side. A diagnosis of Low back pain with sciatica, sciatica laterality unspecified, unspecified back pain laterality, unspecified chronicity was also pertinent to this visit. Assessment   Activity Tolerance: Patient limited by pain; Patient limited by endurance  Equipment Needed: Yes  Mobility Devices: [address]: 1 time a day 7 days a week  Current Treatment Recommendations: Gait training;Stair training; Safety education & training     Restrictions  Restrictions/Precautions  Restrictions/Precautions: General Precautions  Position Activity Restriction  Other position/activity restrictions: back pain limiting mobility     Subjective    Subjective  Subjective: Pt tearful on arrival stating \"it hurts so bad\". Pt in extreme pain but willing to cooperate with therapy  Pain: 10/10 in B hips radiating through legs. Sciatica pain  Orientation  Overall Orientation Status: Within Normal Limits  Cognition  Overall Cognitive Status: WNL     Objective   Vitals     Bed Mobility Training  Bed Mobility Training: Yes  Overall Level of Assistance: Stand-by assistance  Interventions: Safety awareness training;Verbal cues; Tactile cues  Rolling: Stand-by assistance  Supine to Sit: Stand-by assistance  Sit to Supine: Contact-guard assistance  Scooting: Contact-guard assistance  Balance  Sitting: With support (with and without support)  Sitting - Static: Good (unsupported)  Sitting - Dynamic: Fair (occasional)  Standing: With support  Standing - Static: Fair  Standing - Dynamic: Poor (Forward

## 2022-06-08 LAB
ANION GAP SERPL CALCULATED.3IONS-SCNC: 10 MMOL/L (ref 9–17)
BUN BLDV-MCNC: 32 MG/DL (ref 8–23)
CALCIUM SERPL-MCNC: 8.8 MG/DL (ref 8.6–10.4)
CHLORIDE BLD-SCNC: 107 MMOL/L (ref 98–107)
CO2: 23 MMOL/L (ref 20–31)
CREAT SERPL-MCNC: 0.97 MG/DL (ref 0.5–0.9)
GFR AFRICAN AMERICAN: >60 ML/MIN
GFR NON-AFRICAN AMERICAN: 58 ML/MIN
GFR SERPL CREATININE-BSD FRML MDRD: ABNORMAL ML/MIN/{1.73_M2}
GLUCOSE BLD-MCNC: 157 MG/DL (ref 65–105)
GLUCOSE BLD-MCNC: 185 MG/DL (ref 70–99)
GLUCOSE BLD-MCNC: 204 MG/DL (ref 65–105)
GLUCOSE BLD-MCNC: 210 MG/DL (ref 65–105)
GLUCOSE BLD-MCNC: 253 MG/DL (ref 65–105)
HCT VFR BLD CALC: 30.1 % (ref 36–46)
HEMOGLOBIN: 9.7 G/DL (ref 12–16)
MCH RBC QN AUTO: 28.7 PG (ref 26–34)
MCHC RBC AUTO-ENTMCNC: 32.1 G/DL (ref 31–37)
MCV RBC AUTO: 89.3 FL (ref 80–100)
PDW BLD-RTO: 13.4 % (ref 11.5–14.9)
PLATELET # BLD: 287 K/UL (ref 150–450)
PMV BLD AUTO: 7.6 FL (ref 6–12)
POTASSIUM SERPL-SCNC: 4.5 MMOL/L (ref 3.7–5.3)
RBC # BLD: 3.37 M/UL (ref 4–5.2)
SODIUM BLD-SCNC: 140 MMOL/L (ref 135–144)
WBC # BLD: 8.5 K/UL (ref 3.5–11)

## 2022-06-08 PROCEDURE — 1200000000 HC SEMI PRIVATE

## 2022-06-08 PROCEDURE — 6370000000 HC RX 637 (ALT 250 FOR IP): Performed by: STUDENT IN AN ORGANIZED HEALTH CARE EDUCATION/TRAINING PROGRAM

## 2022-06-08 PROCEDURE — 6360000002 HC RX W HCPCS: Performed by: INTERNAL MEDICINE

## 2022-06-08 PROCEDURE — 97110 THERAPEUTIC EXERCISES: CPT

## 2022-06-08 PROCEDURE — 80048 BASIC METABOLIC PNL TOTAL CA: CPT

## 2022-06-08 PROCEDURE — 6370000000 HC RX 637 (ALT 250 FOR IP): Performed by: INTERNAL MEDICINE

## 2022-06-08 PROCEDURE — 36415 COLL VENOUS BLD VENIPUNCTURE: CPT

## 2022-06-08 PROCEDURE — 2580000003 HC RX 258: Performed by: STUDENT IN AN ORGANIZED HEALTH CARE EDUCATION/TRAINING PROGRAM

## 2022-06-08 PROCEDURE — 99232 SBSQ HOSP IP/OBS MODERATE 35: CPT | Performed by: INTERNAL MEDICINE

## 2022-06-08 PROCEDURE — 97116 GAIT TRAINING THERAPY: CPT

## 2022-06-08 PROCEDURE — 82947 ASSAY GLUCOSE BLOOD QUANT: CPT

## 2022-06-08 PROCEDURE — 6370000000 HC RX 637 (ALT 250 FOR IP)

## 2022-06-08 PROCEDURE — 97530 THERAPEUTIC ACTIVITIES: CPT

## 2022-06-08 PROCEDURE — 85027 COMPLETE CBC AUTOMATED: CPT

## 2022-06-08 RX ORDER — CYCLOBENZAPRINE HCL 10 MG
5 TABLET ORAL NIGHTLY
Status: DISCONTINUED | OUTPATIENT
Start: 2022-06-08 | End: 2022-06-10 | Stop reason: HOSPADM

## 2022-06-08 RX ADMIN — ASPIRIN 81 MG: 81 TABLET, COATED ORAL at 08:54

## 2022-06-08 RX ADMIN — OXYCODONE HYDROCHLORIDE AND ACETAMINOPHEN 1 TABLET: 5; 325 TABLET ORAL at 12:30

## 2022-06-08 RX ADMIN — HEPARIN SODIUM 5000 UNITS: 5000 INJECTION INTRAVENOUS; SUBCUTANEOUS at 13:07

## 2022-06-08 RX ADMIN — ACETAMINOPHEN 500 MG: 500 TABLET ORAL at 16:49

## 2022-06-08 RX ADMIN — INSULIN LISPRO 1 UNITS: 100 INJECTION, SOLUTION INTRAVENOUS; SUBCUTANEOUS at 08:56

## 2022-06-08 RX ADMIN — DOCUSATE SODIUM 100 MG: 100 CAPSULE, LIQUID FILLED ORAL at 08:54

## 2022-06-08 RX ADMIN — GABAPENTIN 300 MG: 300 CAPSULE ORAL at 13:07

## 2022-06-08 RX ADMIN — DOCUSATE SODIUM 100 MG: 100 CAPSULE, LIQUID FILLED ORAL at 20:38

## 2022-06-08 RX ADMIN — INSULIN GLARGINE 12 UNITS: 100 INJECTION, SOLUTION SUBCUTANEOUS at 08:55

## 2022-06-08 RX ADMIN — ACETAMINOPHEN 500 MG: 500 TABLET ORAL at 08:55

## 2022-06-08 RX ADMIN — INSULIN LISPRO 2 UNITS: 100 INJECTION, SOLUTION INTRAVENOUS; SUBCUTANEOUS at 11:45

## 2022-06-08 RX ADMIN — OXYCODONE HYDROCHLORIDE AND ACETAMINOPHEN 1 TABLET: 5; 325 TABLET ORAL at 19:35

## 2022-06-08 RX ADMIN — HEPARIN SODIUM 5000 UNITS: 5000 INJECTION INTRAVENOUS; SUBCUTANEOUS at 20:38

## 2022-06-08 RX ADMIN — HEPARIN SODIUM 5000 UNITS: 5000 INJECTION INTRAVENOUS; SUBCUTANEOUS at 06:22

## 2022-06-08 RX ADMIN — OXYCODONE HYDROCHLORIDE AND ACETAMINOPHEN 1 TABLET: 5; 325 TABLET ORAL at 06:22

## 2022-06-08 RX ADMIN — PANTOPRAZOLE SODIUM 40 MG: 40 TABLET, DELAYED RELEASE ORAL at 06:21

## 2022-06-08 RX ADMIN — GABAPENTIN 300 MG: 300 CAPSULE ORAL at 20:38

## 2022-06-08 RX ADMIN — LATANOPROST 1 DROP: 50 SOLUTION OPHTHALMIC at 23:20

## 2022-06-08 RX ADMIN — GABAPENTIN 300 MG: 300 CAPSULE ORAL at 08:54

## 2022-06-08 RX ADMIN — CYCLOBENZAPRINE 5 MG: 10 TABLET, FILM COATED ORAL at 20:38

## 2022-06-08 RX ADMIN — BUSPIRONE HYDROCHLORIDE 10 MG: 10 TABLET ORAL at 08:55

## 2022-06-08 RX ADMIN — SODIUM CHLORIDE: 9 INJECTION, SOLUTION INTRAVENOUS at 09:02

## 2022-06-08 RX ADMIN — INSULIN LISPRO 2 UNITS: 100 INJECTION, SOLUTION INTRAVENOUS; SUBCUTANEOUS at 21:29

## 2022-06-08 RX ADMIN — BUSPIRONE HYDROCHLORIDE 10 MG: 10 TABLET ORAL at 20:38

## 2022-06-08 RX ADMIN — ACETAMINOPHEN 500 MG: 500 TABLET ORAL at 02:55

## 2022-06-08 RX ADMIN — INSULIN LISPRO 2 UNITS: 100 INJECTION, SOLUTION INTRAVENOUS; SUBCUTANEOUS at 16:49

## 2022-06-08 ASSESSMENT — PAIN DESCRIPTION - LOCATION
LOCATION: BACK;HIP
LOCATION: BACK
LOCATION: BACK;HIP
LOCATION: BACK

## 2022-06-08 ASSESSMENT — PAIN DESCRIPTION - DESCRIPTORS
DESCRIPTORS: THROBBING
DESCRIPTORS: THROBBING
DESCRIPTORS: ACHING
DESCRIPTORS: THROBBING;STABBING

## 2022-06-08 ASSESSMENT — ENCOUNTER SYMPTOMS
VOMITING: 0
ABDOMINAL DISTENTION: 0
SHORTNESS OF BREATH: 0
ABDOMINAL PAIN: 0
NAUSEA: 0
CONSTIPATION: 0
DIARRHEA: 0
BACK PAIN: 1

## 2022-06-08 ASSESSMENT — PAIN SCALES - GENERAL
PAINLEVEL_OUTOF10: 8
PAINLEVEL_OUTOF10: 4
PAINLEVEL_OUTOF10: 8
PAINLEVEL_OUTOF10: 9
PAINLEVEL_OUTOF10: 9
PAINLEVEL_OUTOF10: 4
PAINLEVEL_OUTOF10: 8
PAINLEVEL_OUTOF10: 9

## 2022-06-08 ASSESSMENT — PAIN DESCRIPTION - ORIENTATION
ORIENTATION: LEFT
ORIENTATION: LOWER
ORIENTATION: LEFT

## 2022-06-08 NOTE — CARE COORDINATION
Precert still pending for [facility]. SW was informed that they did send updated notes to the insurance , and are waiting to hear. LSW will continue to follow.      Electronically signed by Morris Young on 6/8/22 at 2:55 PM EDT

## 2022-06-08 NOTE — PROGRESS NOTES
Pulmonary Progress Note  [facility]      Patient - Tracey Pineda,  Age - 65 y.o.    - 1960      Room Number - 6-01   MRN -  [id number]   [facility]  Date of Admission -  2022  5:16 AM        Wing Geo MD  Primary Care Physician - Maicej Cuenca MD     SUBJECTIVE     The patient is alert, oriented and seated up in her chair during interview. Rehab was preparing to start their session and walk with her. She states that she is still having pain in her posterior thighs and buttox that has not improved since restarting her pain medication. She is relieved to have her leigh catheter removed and states that she has not had difficulty urinating. She denies shortness of breath, chest pain, or cough. She says that her appetite is decreased today. OBJECTIVE   VITALS    weight is 216 lb 7.9 oz (98.2 kg). Her oral temperature is 98.3 °F (36.8 °C). Her blood pressure is 137/75 and her pulse is 76. Her respiration is 20 and oxygen saturation is 96%. Body mass index is 39.6 kg/m².   Temperature Range: Temp: 98.3 °F (36.8 °C) Temp  Av.4 °F (36.9 °C)  Min: 98.2 °F (36.8 °C)  Max: 98.8 °F (37.1 °C)  BP Range:  Systolic (27LQT), ZIC:329 , Min:126 , YGN:094     Diastolic (31XBC), QWT:65, Min:70, Max:82    Pulse Range: Pulse  Av  Min: 76  Max: 101  Respiration Range: Resp  Av.2  Min: 15  Max: 20  Current Pulse Ox[de-identified]  SpO2: 96 %  24HR Pulse Ox Range:  SpO2  Av.3 %  Min: 95 %  Max: 100 %  Oxygen Amount and Delivery: O2 Flow Rate (L/min): 1 L/min    Wt Readings from Last 3 Encounters:   22 216 lb 7.9 oz (98.2 kg)   22 202 lb (91.6 kg)   22 204 lb 6.4 oz (92.7 kg)       I/O (24 Hours)    Intake/Output Summary (Last 24 hours) at 2022 1101  Last data filed at 2022 0948  Gross per 24 hour   Intake 480 ml   Output 2175 ml   Net -1695 ml       EXAM General Appearance  Awake, alert, oriented, in no acute distress  HEENT - normocephalic, atraumatic.  []  Mallampati  [x] Crowded airway   [x] Macroglossia  []  Retrognathia  [] Micrognathia  []  Normal tongue size []  Normal Bite  [] Salvador sign positive    Neck - Supple,  trachea midline   Lungs -  CTA bilaterally, no wheezes rales or ronchi  Cardiovascular - Heart sounds are normal.  Regular rate and rhythm   Abdomen - Soft, nontender, nondistended, no masses or organomegaly  Neurologic - There are no focal motor or sensory deficits  Skin - No bruising or bleeding  Extremities - No clubbing, cyanosis, edema    MEDS      insulin glargine  12 Units SubCUTAneous Daily    gabapentin  300 mg Oral TID    heparin (porcine)  5,000 Units SubCUTAneous 3 times per day    docusate sodium  100 mg Oral BID    [Held by provider] oxyCODONE  10 mg Oral 2 times per day    aspirin  81 mg Oral Daily    latanoprost  1 drop Both Eyes Nightly    busPIRone  10 mg Oral BID    [Held by provider] chlorthalidone  25 mg Oral Daily    pantoprazole  40 mg Oral QAM AC    [Held by provider] lisinopril  40 mg Oral Daily    [Held by provider] rOPINIRole  2 mg Oral Nightly    [Held by provider] rosuvastatin  20 mg Oral Nightly    insulin lispro  0-6 Units SubCUTAneous TID WC    insulin lispro  0-3 Units SubCUTAneous Nightly      sodium chloride 75 mL/hr at 06/08/22 0902    dextrose       oxyCODONE-acetaminophen, midodrine, hydrALAZINE, perflutren lipid microspheres, glucose, dextrose bolus **OR** dextrose bolus, glucagon (rDNA), dextrose, acetaminophen, albuterol sulfate HFA    LABS   CBC   Recent Labs     06/08/22  0657   WBC 8.5   HGB 9.7*   HCT 30.1*   MCV 89.3        BMP:   Lab Results   Component Value Date     06/08/2022    K 4.5 06/08/2022     06/08/2022    CO2 23 06/08/2022    BUN 32 06/08/2022    LABALBU 4.5 05/31/2022    LABALBU 4.1 05/19/2012    CREATININE 0.97 06/08/2022    CALCIUM 8.8 06/08/2022 GFRAA >60 06/08/2022    LABGLOM 58 06/08/2022     ABGs:No results found for: PHART, PO2ART, [id number] No results found for: IFIO2, MODE, SETTIDVOL, SETPEEP  Ionized Calcium:  No results found for: IONCA  Magnesium:    Lab Results   Component Value Date    MG 1.6 07/21/2021     Phosphorus:    Lab Results   Component Value Date    PHOS 3.1 07/21/2021        LIVER PROFILE No results for input(s): AST, ALT, LIPASE, BILIDIR, BILITOT, ALKPHOS in the last 72 hours. Invalid input(s): AMYLASE,  ALB  INR No results for input(s): INR in the last 72 hours. PTT   Lab Results   Component Value Date    APTT 25.7 07/23/2013         RADIOLOGY         (See actual reports for details)    ASSESSMENT/PLAN     Altered Mental Status improving  Morbid obesity  Possible-high suspicion for sleep apnea- no sleep study history. HTN- within normal limits  DM2- hyperglycemia improving 157  Anemia- hemoglobin 9.7  HAYLEY improving  Back and Leg Pain- persists despite pain management  Bourgeois catheter removed  Bedside swallow study within normal limits      Lantus and sliding scale insulin for management of hyperglycemia  Monitor hemoglobin levels as they are trending down  Monitor creatine and electrolytes  Continue gabapentin and oxycodone 5mg PRN  Continue 0.9 NS IV fluids  DVT prophylaxis with Heparin  Monitor O2 levels and assess need for BiPAP or CPAP  Outpatient sleep study for KVNG  PT/OT  Awaiting SNF placement        Electronically signed by Coretta Pritchett on 6/8/2022 at 11:01 AM    Patient seen and examined independently by me. Above discussed and I agree with medical student note except where indicated in the EMR revision history. Also see my additional comments and changes indicated by discrete font, text color, italics, and/or initials. Labs, cultures, and radiographs where available were reviewed.        She appears to be close to her baseline but is still fatigued and skilled nursing has been recommended  She will need to follow-up outpatient sleep study, KVNG may be contributing to her fatigue  (She has risk factors of diabetes, hypertension, and morbid obesity)  Do not think she needs fluids anymore, she is eating and drinking. No objection to skilled nursing from our standpoint. Follow-up in the office in approximately 4 to 6 weeks. I have ordered her sleep study in the discharge planning section. The order should go to [facility].   Electronically signed by Karen Degroot MD on 6/8/2022 at 3:36 PM

## 2022-06-08 NOTE — CARE COORDINATION
SW notified Patient that [facility] center can accept and that a pre-cert was started. SW explained this process and answered pt questions. LSW will continue to follow.      Electronically signed by Meredith Hdez on 6/8/22 at 3:04 PM EDT

## 2022-06-08 NOTE — PROGRESS NOTES
NEPHROLOGY progress note    Patient :  Trevor Momin; 65 y.o. MRN# [id number]  Location:  2076/2076-01  Attending:  Danielle Macario MD  Admit Date:  5/31/2022   Hospital Day: 2      Reason for Consult: Acute kidney injury      Chief Complaint: Follow back pain      Subjective/interval history. Patient seen and examined doing better no acute events overnight. Blood pressure is stable. Kidney function improved serum creatinine improved to 1.0 mg/dL. Patient has a Bourgeois catheter draining well patient requesting for removal of Bourgeois catheter    History of Present Illness: This is a 65 y.o. female past medical history of cervical spinal lysis, history of essential hypertension, history of [place], type 2 diabetes presented to the hospital on 5/31/2022 with complaints of fall and back pain going to the legs  . Serum creatinine on admission was 1.5 mg/dL which improved to 1.1 mg/dL on 6/2/2022-----------> serum creatinine on 6/5/2022 peaked 3.5mg/dL  Patient was noted to be hypotensive on 6/5/2022  Patient also required Bourgeois catheter placement because of urinary retention  Nephrology consulted for evaluation management of acute kidney injury    Patient is was using NSAIDs. There has been no recent exposure to IV contrast.   There is no history  of paraprotein disease. Pt denies any history of recurrent UTI or kidney stones. Medication review shows use of ACE-inhibitor/diuretics.   Chlorthalidone and lisinopril were stopped    Past Medical History:        Diagnosis Date    Cervical spondylosis 4/2009    c-4 c-5, c-5 c-6, c-6 c-7    Generalized anxiety disorder 12/8/2020    Hyperlipidemia     Hypertension     Lumbar radiculopathy     DIEGO (nonalcoholic steatohepatitis) 10/12/2014    Obesity     Osteoarthritis of left knee 8/19/2014    Restless legs syndrome     Type II or unspecified type diabetes mellitus without mention of complication, not stated as uncontrolled        Past Surgical History: Procedure Laterality Date     SECTION      [facility]COPY  2012    [facility]    SHOULDER SURGERY Left 2021     SHOULDER MANIPULATION WITH PRE OP INTERSCALENE BLOCK     SHOULDER SURGERY Left 2021    SHOULDER MANIPULATION WITH PRE OP INTERSCALENE BLOCK and intraop injection performed by Marcelina Holm DO at [address] [place] [address]      Polymenorrhagia    UPPER GASTROINTESTINAL ENDOSCOPY  2012    [facility]       Current Medications:    cyclobenzaprine (FLEXERIL) tablet 5 mg, Nightly  insulin glargine (LANTUS) injection vial 12 Units, Daily  oxyCODONE-acetaminophen (PERCOCET) 5-325 MG per tablet 1 tablet, Q6H PRN  gabapentin (NEURONTIN) capsule 300 mg, TID  midodrine (PROAMATINE) tablet 10 mg, TID PRN  hydrALAZINE (APRESOLINE) injection 10 mg, Q6H PRN  perflutren lipid microspheres (DEFINITY) injection 1.65 mg, ONCE PRN  heparin (porcine) injection 5,000 Units, 3 times per day  docusate sodium (COLACE) capsule 100 mg, BID  [Held by provider] oxyCODONE (OXYCONTIN) extended release tablet 10 mg, 2 times per day  glucose chewable tablet 16 g, PRN  dextrose bolus 10% 125 mL, PRN   Or  dextrose bolus 10% 250 mL, PRN  glucagon (rDNA) injection 1 mg, PRN  dextrose 5 % solution, PRN  acetaminophen (TYLENOL) tablet 500 mg, Q6H PRN  albuterol sulfate  (90 Base) MCG/ACT inhaler 2 puff, Q4H PRN  aspirin EC tablet 81 mg, Daily  latanoprost (XALATAN) 0.005 % ophthalmic solution 1 drop, Nightly  busPIRone (BUSPAR) tablet 10 mg, BID  [Held by provider] chlorthalidone (HYGROTON) tablet 25 mg, Daily  pantoprazole (PROTONIX) tablet 40 mg, QAM AC  [Held by provider] lisinopril (PRINIVIL;ZESTRIL) tablet 40 mg, Daily  [Held by provider] rOPINIRole (REQUIP) tablet 2 mg, Nightly  [Held by provider] rosuvastatin (CRESTOR) tablet 20 mg, Nightly  insulin lispro (HUMALOG) injection vial 0-6 Units, TID WC  insulin lispro (HUMALOG) injection vial 0-3 Units, Nightly        Allergies:  Codeine    Objective:  CURRENT TEMPERATURE:  Temp: 97.8 °F (36.6 °C)  MAXIMUM TEMPERATURE OVER 24HRS:  Temp (24hrs), Av.1 °F (36.7 °C), Min:97.8 °F (36.6 °C), Max:98.3 °F (36.8 °C)    CURRENT RESPIRATORY RATE:  Resp: 20  CURRENT PULSE:  Heart Rate: 82  CURRENT BLOOD PRESSURE:  BP: (!) 158/82  24HR BLOOD PRESSURE RANGE:  Systolic (39FQG), SDQ:476 , Min:137 , LEJ:851   ; Diastolic (01CSI), FHE:85, Min:75, Max:82    24HR INTAKE/OUTPUT:      Intake/Output Summary (Last 24 hours) at 2022 1620  Last data filed at 2022 1557  Gross per 24 hour   Intake 3178.45 ml   Output 2250 ml   Net 928.45 ml     Patient Vitals for the past 96 hrs (Last 3 readings):   Weight   22 0407 216 lb 7.9 oz (98.2 kg)       Physical Exam:  GENERAL APPEARANCE: Alert and cooperative, and appears to be in no acute distress. HEAD: normocephalic  EYES:  EOMI. Not pale, anicteric   NOSE:  No nasal discharge. CARDIAC: Normal S1 and S2. No S3, S4 or murmurs. Rhythm is regular. LUNGS: Clear to auscultation and percussion without rales, rhonchi, wheezing or diminished breath sounds. GI-soft nontender  MUSKULOSKELETAL: Adequately aligned spine. No joint erythema or tenderness. EXTREMITIES: Trace edema. Peripheral pulses intact. NEURO: Nonfocal      Labs:   CBC:  Recent Labs     22  0336 22  0448 22  0657   WBC 11.7* 10.7 8.5   RBC 4.14 3.75* 3.37*   HGB 12.0 10.6* 9.7*   HCT 37.4 33.0* 30.1*   MCV 90.3 88.1 89.3   MCH 28.9 28.2 28.7   MCHC 32.1 32.0 32.1   RDW 13.6 13.3 13.4    277 287   MPV 8.4 8.0 7.6      BMP:   Recent Labs     22  0336 22  0448 22  0657    140 140   K 4.8 4.7 4.5    109* 107   CO2 18* 20 23   BUN 80* 41* 32*   CREATININE 2.19* 1.02* 0.97*   GLUCOSE 245* 241* 185*   CALCIUM 8.7 8.9 8.8      Phosphorus:  No results for input(s): PHOS in the last 72 hours. Magnesium: No results for input(s): MG in the last 72 hours.   Albumin: No results for input(s): LABALBU in the last 72 hours. IRON:  No results for input(s): IRON in the last 72 hours. Iron Saturation:  Invalid input(s): PERCENTFE  TIBC:  No results for input(s): TIBC in the last 72 hours. FERRITIN:  No results for input(s): FERRITIN in the last 72 hours. SPEP: No results for input(s): SPEP in the last 72 hours. No results for input(s): PROT, ALBCAL, ALBCAL, ALBPCT, LABALPH, A1PCT, LABALPH, A2PCT, LABBETA, BETAPCT, GAMGLOB, GGPCT, PATH in the last 72 hours. UPEP: No results for input(s): TPU in the last 72 hours. Urine Sodium:    Recent Labs     06/05/22 2121   SHANTELLE 27      Urine Potassium:   Recent Labs     06/05/22 2121   KUR 39.4     Urine Chloride:  No results for input(s): CLU in the last 72 hours. Urine Ph:  Invalid input(s): PO4U  Urine Osmolarity:   Recent Labs     06/05/22 2121   OSMOU 335     Urine Creatinine:    Recent Labs     06/05/22 2121   LABCREA 145.5     Urine Eosinophils: Invalid input(s): EOSU  Urine Protein:  No results for input(s): TPU in the last 72 hours. Urinalysis:    No results for input(s): Roxana [place], PHUR, LABCAST, WBCUA, RBCUA, MUCUS, TRICHOMONAS, YEAST, BACTERIA, CLARITYU, SPECGRAV, LEUKOCYTESUR, UROBILINOGEN, BILIRUBINUR, BLOODU, GLUCOSEU, KETUA, AMORPHOUS in the last 72 hours. Radiology:  Reviewed as available. Assessment:  1. HAYLEY nonoliguric most likely secondary to prerenal azotemia versus ATN due to hypotension use of chlorthalidone and lisinopril, urine sodium 27, urine analysis UA unremarkable except for trace hemoglobin trace protein, kidney function is improved back to baseline     2. Urinary retention status post Bourgeois catheter placement, Bourgeois catheter removed    3. Hypotension improved     4. Type 2 diabetes    5. CKD stage IIIa baseline creatinine 1.0 to 1.1 mg/dL secondary to diabetic/hypertensive nephropathy      2.         Plan:  DC IV fluids   continue to hold chlorthalidone and lisinopril  Strict I's and  Avoid hypotension, avoid nephrotoxic agent  Avoid NSAIDs  We will sign off and follow on as-needed basis    Thank you for the consultation.       Electronically signed by Nohemi Gregorio MD on 6/8/2022 at 4:20 PM

## 2022-06-08 NOTE — PROGRESS NOTES
Occupational Therapy  Facility/Department: [facility] MED SURG  Daily Treatment Note  NAME: Morena Vigil  : 1960  MRN: [id number]    Date of Service: 2022    Discharge Recommendations:  Patient would benefit from continued therapy after discharge         Patient Diagnosis(es): The primary encounter diagnosis was Sciatica of left side. Diagnoses of Low back pain with sciatica, sciatica laterality unspecified, unspecified back pain laterality, unspecified chronicity, Daytime somnolence, Class 2 obesity due to excess calories with body mass index (BMI) of 39.0 to 39.9 in adult, unspecified whether serious comorbidity present, and Fatigue, unspecified type were also pertinent to this visit. Assessment    Activity Tolerance: Patient limited by pain; Patient limited by endurance  Discharge Recommendations: Patient would benefit from continued therapy after discharge      Plan   Plan  Times per Week: 4-5  Times per Day: Twice a day  Current Treatment Recommendations: ROM;Balance training;Strengthening; Functional mobility training; Endurance training;Equipment evaluation, education, & procurement;Patient/Caregiver education & training;Pain management;Self-Care / ADL; Home management training     Restrictions  Restrictions/Precautions  Restrictions/Precautions: General Precautions  Position Activity Restriction  Other position/activity restrictions: back pain limiting mobility    Subjective   Subjective  Subjective: \"Better than I was. \" and pt also reporting autumn hand numbness this date, ELELA Hodgson OT/PT co-tx this date with MARGO Tracy.    Orientation  Overall Orientation Status: Within Normal Limits  Cognition  Overall Cognitive Status: WNL  Orientation  Overall Orientation Status: Within Functional Limits     Objective    Vitals     Bed Mobility Training  Bed Mobility Training: No  Balance  Sitting: Without support  Standing: With support  Transfer Training  Transfer Training: Yes  Interventions: Safety awareness training;Verbal cues  Sit to Stand: Contact-guard assistance  Stand to Sit: Minimum assistance (for contolled decent)  Stand Pivot Transfers: Contact-guard assistance (VC for minimizing twisting of the spine. )  Gait Training  Gait Training: Yes  Gait  Interventions: Safety awareness training;Verbal cues  Base of Support: Widened  Speed/Kaelyn: Slow;Shuffled  Step Length: Left shortened;Right shortened  Swing Pattern: Left asymmetrical;Right asymmetrical  Stance: Left decreased;Right decreased  Gait Abnormalities: Decreased step clearance; Path deviations; Shuffling gait;Trunk sway increased  Distance (ft): 30 Feet  Assistive Device: Walker, rolling     ADL  Feeding: Setup  Grooming: Setup  UE Bathing: Moderate assistance  LE Bathing: Dependent/Total  UE Dressing: Moderate assistance  LE Dressing: Maximum assistance  Toileting: Maximum assistance  Additional Comments: ADL scores based on skilled observation and clinical reasoning, unless otherwise noted. Assistance required due to severe pain, limiting mobility and independence with self care  OT Exercises  Exercise Treatment: BUE excerises with 12 fl oz ~ 3.2# for 10 reps elicited to support overall strength needed to participate in functional tasks safely and independendtly. Pt engaged in elbow flex/ext, shoulder flex/ext, shoulder protratcion/retraction, and shoulder horizontal ABD/ADD.  G tolerance noted with exercises           Patient Education  Education Given To: Patient  Education Provided: Role of Therapy;Plan of Care  Education Method: Verbal;Demonstration  Education Outcome: Continued education needed    Goals  Short Term Goals  Time Frame for Short term goals: 1 week  Short Term Goal 1: mod indep LE bathe and dress  Short Term Goal 2: mod indep amb to obtain set up for adls with fair pain control  Short Term Goal 3: carter 6-8 min stand, ambulate for adls with fair pain control  Short Term Goal 4: indep with UE HEP for LUE ROM and BUE strengthening (when tolerated)  Short Term Goal 5: pt to verbalize understanding of bathroom equipment which may be helpful to her.   Patient Goals   Patient goals : return to prior level of indep with adls including work and amb without cane       Therapy Time   Individual Concurrent Group Co-treatment   Time In 1111         Time Out EDITH Becker

## 2022-06-08 NOTE — PLAN OF CARE
Problem: Discharge Planning  Goal: Discharge to home or other facility with appropriate resources  Outcome: Progressing  Flowsheets (Taken 6/8/2022 1708)  Discharge to home or other facility with appropriate resources:   Identify barriers to discharge with patient and caregiver   Arrange for needed discharge resources and transportation as appropriate   Identify discharge learning needs (meds, wound care, etc)   Arrange for interpreters to assist at discharge as needed   Refer to discharge planning if patient needs post-hospital services based on physician order or complex needs related to functional status, cognitive ability or social support system  Note: Discharge planners following for further discharge needs      Problem: Safety - Adult  Goal: Free from fall injury  Outcome: Progressing  Flowsheets (Taken 6/8/2022 1708)  Free From Fall Injury:   Instruct family/caregiver on patient safety   Based on caregiver fall risk screen, instruct family/caregiver to ask for assistance with transferring infant if caregiver noted to have fall risk factors  Note: No falls this shift. Call light with in reach, side rails up x2, bed in lowest postion. Patients safety maintained. Problem: Pain  Goal: Verbalizes/displays adequate comfort level or baseline comfort level  Outcome: Progressing  Flowsheets (Taken 6/8/2022 1708)  Verbalizes/displays adequate comfort level or baseline comfort level:   Encourage patient to monitor pain and request assistance   Assess pain using appropriate pain scale   Administer analgesics based on type and severity of pain and evaluate response   Implement non-pharmacological measures as appropriate and evaluate response   Consider cultural and social influences on pain and pain management   Notify Licensed Independent Practitioner if interventions unsuccessful or patient reports new pain  Note: Patients pain level decreased with prescribed pain medications.       Problem: Chronic Conditions and Co-morbidities  Goal: Patient's chronic conditions and co-morbidity symptoms are monitored and maintained or improved  Outcome: Progressing     Problem: ABCDS Injury Assessment  Goal: Absence of physical injury  Outcome: Progressing  Flowsheets (Taken 6/8/2022 1708)  Absence of Physical Injury: Implement safety measures based on patient assessment  Note: No injury this shift. Patients safety maintained.

## 2022-06-08 NOTE — PROGRESS NOTES
Transfer of Care Note    Pt is a 71-year-old female initially admitted for an acute on chronic back pain possibly exacerbated from repetitive movements/overuse, repeat MRI did not show any changes from prior imaging, patient has disc bulging at multiple levels in the lumbar spine. Patient was admitted to our service once transferred to the ICU due to tachycardia, low blood pressure and altered mentation likely secondary to multiple medications for pain management. In the ICU patient did have urinary retention, relieved with placement of Bourgeois catheter which is now removed, ultrasound of kidneys was unremarkable. Have restarted patient's gabapentin and provided oxycodone 5 mg as needed. Patient most likely will require placement at SNF.     Other conditions that we are managing:  Diabetes mellitus - A1C 7.0, on Latnus 12U nightly   HAYLEY on CKD - resolved   Suspected KVNG - Will likely need outpatient sleep study     Electronically signed by Lilia Reddy MD on 6/8/2022 at 6:57 AM

## 2022-06-08 NOTE — PROGRESS NOTES
Occupational Therapy  Facility/Department: [facility] MED SURG  Daily Treatment Note  NAME: Jenny Wiley  : 1960  MRN: [id number]    Date of Service: 2022    Discharge Recommendations:  Patient would benefit from continued therapy after discharge         Patient Diagnosis(es): The primary encounter diagnosis was Sciatica of left side. Diagnoses of Low back pain with sciatica, sciatica laterality unspecified, unspecified back pain laterality, unspecified chronicity, Daytime somnolence, Class 2 obesity due to excess calories with body mass index (BMI) of 39.0 to 39.9 in adult, unspecified whether serious comorbidity present, and Fatigue, unspecified type were also pertinent to this visit. Assessment    Activity Tolerance: Patient limited by pain; Patient limited by endurance  Discharge Recommendations: Patient would benefit from continued therapy after discharge      Plan   Plan  Times per Week: 4-5  Times per Day: Twice a day  Current Treatment Recommendations: ROM;Balance training;Strengthening; Functional mobility training; Endurance training;Equipment evaluation, education, & procurement;Patient/Caregiver education & training;Pain management;Self-Care / ADL; Home management training     Restrictions  Restrictions/Precautions  Restrictions/Precautions: General Precautions  Position Activity Restriction  Other position/activity restrictions: back pain limiting mobility    Subjective   Subjective  Subjective: \"Better than I was. \" and pt also reporting autumn hand numbness this date, LEELA Hodgson OT/PT co-tx this date with MARGO Tracy.    Orientation  Overall Orientation Status: Within Normal Limits  Cognition  Overall Cognitive Status: WNL  Orientation  Overall Orientation Status: Within Functional Limits     Objective    Vitals     Bed Mobility Training  Bed Mobility Training: No  Balance  Sitting: Without support  Standing: With support  Transfer Training  Transfer Training: Yes  Interventions: Safety awareness training;Verbal cues  Sit to Stand: Contact-guard assistance  Stand to Sit: Minimum assistance (for contolled decent)  Stand Pivot Transfers: Contact-guard assistance (VC for minimizing twisting of the spine. )  Gait Training  Gait Training: Yes  Gait  Interventions: Safety awareness training;Verbal cues  Base of Support: Widened  Speed/Kaelyn: Slow;Shuffled  Step Length: Left shortened;Right shortened  Swing Pattern: Left asymmetrical;Right asymmetrical  Stance: Left decreased;Right decreased  Gait Abnormalities: Decreased step clearance; Path deviations; Shuffling gait;Trunk sway increased  Distance (ft): 30 Feet  Assistive Device: Walker, rolling     ADL  Feeding: Setup  Grooming: Setup  UE Bathing: Moderate assistance  LE Bathing: Dependent/Total  UE Dressing: Moderate assistance  LE Dressing: Maximum assistance  Toileting: Maximum assistance  Additional Comments: ADL scores based on skilled observation and clinical reasoning, unless otherwise noted. Assistance required due to severe pain, limiting mobility and independence with self care                   Goals  Short Term Goals  Time Frame for Short term goals: 1 week  Short Term Goal 1: mod indep LE bathe and dress  Short Term Goal 2: mod indep amb to obtain set up for adls with fair pain control  Short Term Goal 3: carter 6-8 min stand, ambulate for adls with fair pain control  Short Term Goal 4: indep with UE HEP for LUE ROM and BUE strengthening (when tolerated)  Short Term Goal 5: pt to verbalize understanding of bathroom equipment which may be helpful to her.   Patient Goals   Patient goals : return to prior level of indep with adls including work and amb without cane       Therapy Time   Individual Concurrent Group Co-treatment   Time In 1111         Time Out EDITH Becker

## 2022-06-08 NOTE — PROGRESS NOTES
[address]    PROGRESS NOTE             6/8/2022    12:54 PM    Name:   Stefan Khalil  MRN:     [id number]     Acct:      [de-identified]   Room:   2076/2076-01   Day:  2  Admit Date:  5/31/2022  5:16 AM    PCP:  Erwin Lewis MD  Code Status:  Full Code    Subjective:     C/C:   Chief Complaint   Patient presents with    Back Pain     Interval History Status: improved. 6/8/22    Patient continues to complain of back pain. Will add Flexeril at bedtime. Vitals stable, blood pressure 158/82  Labs reviewed, blood sugar in 200s this morning. Elevated CRP and CK levels. Unremarkable renal ultrasound however it does show hepatic steatosis  Minimal urine output. Awaiting placement             Review of Systems:     Review of Systems   Constitutional: Negative for chills and fever. Respiratory: Negative for shortness of breath. Cardiovascular: Negative for chest pain, palpitations and leg swelling. Gastrointestinal: Negative for abdominal distention, abdominal pain, constipation, diarrhea, nausea and vomiting. Genitourinary: Negative for dysuria. Musculoskeletal: Positive for back pain. Neurological: Negative for dizziness. Psychiatric/Behavioral: Negative for agitation. Medications: Allergies:     Allergies   Allergen Reactions    Codeine Nausea And Vomiting       Current Meds:   Scheduled Meds:    insulin glargine  12 Units SubCUTAneous Daily    gabapentin  300 mg Oral TID    heparin (porcine)  5,000 Units SubCUTAneous 3 times per day    docusate sodium  100 mg Oral BID    [Held by provider] oxyCODONE  10 mg Oral 2 times per day    aspirin  81 mg Oral Daily    latanoprost  1 drop Both Eyes Nightly    busPIRone  10 mg Oral BID    [Held by provider] chlorthalidone  25 mg Oral Daily    pantoprazole  40 mg Oral QAM AC    [Held by provider] lisinopril  40 mg Oral Daily    [Held by provider] rOPINIRole  2 mg Oral Nightly    [Held by provider] rosuvastatin  20 mg Oral Nightly    insulin lispro  0-6 Units SubCUTAneous TID WC    insulin lispro  0-3 Units SubCUTAneous Nightly     Continuous Infusions:    sodium chloride 75 mL/hr at 22 0902    dextrose       PRN Meds: oxyCODONE-acetaminophen, midodrine, hydrALAZINE, perflutren lipid microspheres, glucose, dextrose bolus **OR** dextrose bolus, glucagon (rDNA), dextrose, acetaminophen, albuterol sulfate HFA    Data:     Past Medical History:   has a past medical history of Cervical spondylosis, Generalized anxiety disorder, Hyperlipidemia, Hypertension, Lumbar radiculopathy, DIEGO (nonalcoholic steatohepatitis), Obesity, Osteoarthritis of left knee, Restless legs syndrome, and Type II or unspecified type diabetes mellitus without mention of complication, not stated as uncontrolled. Social History:   reports that she has never smoked. She has never used smokeless tobacco. She reports current alcohol use. She reports that she does not use drugs. Family History:   Family History   Problem Relation Age of Onset    Heart Attack Mother     Diabetes Mother     Diabetes Father     Heart Attack Father        Vitals:  BP (!) 158/82   Pulse 82   Temp 97.8 °F (36.6 °C) (Oral)   Resp 20   Wt 216 lb 7.9 oz (98.2 kg)   SpO2 96%   BMI 39.60 kg/m²   Temp (24hrs), Av.1 °F (36.7 °C), Min:97.8 °F (36.6 °C), Max:98.3 °F (36.8 °C)    Recent Labs     22  1825 22  1954 22  0719 22  1114   POCGLU 216* 231* 157* 210*       I/O(24Hr):     Intake/Output Summary (Last 24 hours) at 2022 1254  Last data filed at 2022 0948  Gross per 24 hour   Intake 480 ml   Output 1750 ml   Net -1270 ml       Labs:  [unfilled]    Lab Results   Component Value Date/Time    SPECIAL NOT REPORTED 10/07/2021 06:24 PM     Lab Results   Component Value Date/Time    CULTURE NO SIGNIFICANT GROWTH 10/07/2021 06:24 PM [facility]    Radiology:    ECHO Complete 2D W Doppler W Color    Result Date: 6/6/2022  [facility] Transthoracic Echocardiography Report (TTE)  Patient Name Bonnielee Gilford Date of Study                 06/06/2022               LEE ANN CARRASQUILLO   Date of      1960  Gender                        Female  Birth   Age          65 year(s)  Race                             Room Number  2011   Corporate ID [id number]    Weight:                       216 pounds, 98 kg  #   Patient Dread [de-identified]  #   MR #         [id number]      Sonographer                   JosefinarosemohsenMarisol   Accession #  [id number]  Interpreting Physician        Tawny Zelaya   Fellow                   Referring Nurse Practitioner   Interpreting             Referring Physician           Venu Lorenzo  Fellow                                                 Anastacia Huitron  Type of Study   TTE procedure:2D Echocardiogram, M-Mode, Doppler, Color Doppler. Procedure Date Date: 06/06/2022 Start: 02:10 PM Study Location: [address] [place] [address] Technical Quality: Fair visualization Indications:Congestive heart failure. Patient Status: Inpatient Weight: 216 pounds Rhythm: Sinus tachycardia HR: 111 bpm BP: 111/61 mmHg CONCLUSIONS Summary Technically difficult study. Normal LV size. Hyperdynamic left ventricular function. Estimated LV EF 65-70 %. Normal wall thickness. No segmental wall motion abnormalities. Grade I (mild) left ventricular diastolic dysfunction. Normal right ventricular size and function. Left atrium is normal in size. Right atrium was not well visualized. Aortic valve was not well visualized. No aortic stenosis. No aortic insufficiency. Normal aortic root dimension. Normal mitral valve structure and function. No significant mitral regurgitation. Tricuspid valve was not well visualized. Insignificant tricuspid regurgitation, unable to estimate RVSP. IVC not well visualized. No significant pericardial effusion is seen. Signature ----------------------------------------------------------------------------  Electronically signed by Tawny Zelaya(Interpreting physician) on  2022 05:07 PM ---------------------------------------------------------------------------- ----------------------------------------------------------------------------  Electronically signed by Marisol Jorge(Sonographer) on 2022 02:37  PM ---------------------------------------------------------------------------- FINDINGS Left Atrium Left atrium is normal in size. Left Ventricle Small LV cavity. Hyperdynamic left ventricular function. Estimated LV EF 65-70 %. Normal wall thickness. No segmental wall motion abnormalities. Grade I (mild) left ventricular diastolic dysfunction. Right Atrium Right atrium was not well visualized. Right Ventricle Normal right ventricular size and function. Mitral Valve Normal mitral valve structure and function. No significant mitral regurgitation. Aortic Valve Aortic valve was not well visualized. No aortic insufficiency. No aortic stenosis. Tricuspid Valve Tricuspid valve was not well visualized. Insignificant tricuspid regurgitation, unable to estimate RVSP. Pulmonic Valve Pulmonic valve not well visualized but Doppler velocities are normal. No pulmonic insufficiency. Pericardial Effusion No significant pericardial effusion is seen. Miscellaneous Normal aortic root dimension. E/e' average 7.4 IVC not well visualized.  M-mode / 2D Measurements & Calculations:   LVIDd:3.19 cm(3.7 - 5.6 cm)      Diastolic DOTELS:34.4 ml  ELWFS:4.90 cm(2.2 - 4.0 cm)      Systolic NFEDUC:8.20 ml  YYNW:5.13 cm(0.6 - 1.1 cm)       Aortic Root:2.4 cm(2.0 - 3.7 cm)  LVPWd:1.07 cm(0.6 - 1.1 cm)      LA Dimension: 2.9 cm(1.9 - 4.0 cm)  Fractional Shortenin.81 %    LA volume/Index: 17.7 ml  Calculated LVEF (%): 78.22 %     LVOT:1.9 cm   Mitral:                              Aortic   Peak E-Wave: 0.61 m/s                Peak Velocity: 1.69 m/s Peak A-Wave: 0.86 m/s                Mean Velocity: 1.22 m/s  E/A Ratio: 0.71                      Peak Gradient: 11.42 mmHg  Peak Gradient: 1.47 mmHg             Mean Gradient: 7 mmHg  Deceleration Time: 162 msec                                        Area (continuity): 2.44 cm^2                                       AV VTI: 27.1 cm  Septal Wall E' velocity:0.08 m/s Lateral Wall E' velocity:0.08 m/s    XR LUMBAR SPINE (2-3 VIEWS)    Result Date: 5/31/2022  EXAMINATION: THREE XRAY VIEWS OF THE LUMBAR SPINE 5/31/2022 4:27 pm COMPARISON: None. HISTORY: ORDERING SYSTEM PROVIDED HISTORY: Lower back Pain TECHNOLOGIST PROVIDED HISTORY: Lower back Pain Reason for Exam: PT CO acute lower back pain X several days with NKI per pt. FINDINGS: There is a mild dextroscoliosis of the lower lumbar spine. There are 5 lumbar segments There is mild anterior spurring of the visualized lumbar vertebral bodies There is moderate L5-S1 joint space compromise. The remaining disc spaces and vertical heights of the vertebral bodies are maintained There is no pedicular/pars interarticularis defect, fracture or dislocation seen     Scoliosis and L5-S1 degenerative changes     MRI LUMBAR SPINE WO CONTRAST    Result Date: 6/2/2022  EXAMINATION: MRI OF THE LUMBAR SPINE WITHOUT CONTRAST, 6/2/2022 12:46 pm TECHNIQUE: Multiplanar multisequence MRI of the lumbar spine was performed without the administration of intravenous contrast. COMPARISON: 03/22/2022 HISTORY: ORDERING SYSTEM PROVIDED HISTORY: lumbar left radicular pain FINDINGS: BONES/ALIGNMENT: There is loss of the normal lordotic curvature. The vertebral body heights are maintained. The bone marrow signal appears unremarkable. Mild degenerative disc height loss at L5-S1. Mild multilevel facet arthropathy. SPINAL CORD: The conus terminates normally. SOFT TISSUES: No paraspinal mass identified.  L1-L2: There is no significant disc herniation, spinal canal stenosis or neural foraminal narrowing. L2-L3: There is no significant disc herniation, spinal canal stenosis or neural foraminal narrowing. L3-L4: There is no significant disc herniation, spinal canal stenosis or neural foraminal narrowing. L4-L5: There is no significant disc herniation, spinal canal stenosis or neural foraminal narrowing. L5-S1: Disc bulging with asymmetric encroachment on the left lateral recess and abutment of the transversing left S1 nerve root. No significant foraminal stenosis. No significant change compared to MR lumbar spine from 03/22/2022. Disc bulging at L5-S1 with asymmetric encroachment on the left lateral recess and abutment of the transversing left S1 nerve root. XR CHEST PORTABLE    Result Date: 6/5/2022  EXAMINATION: ONE XRAY VIEW OF THE CHEST 6/5/2022 6:35 pm COMPARISON: 02/04/2021 HISTORY: ORDERING SYSTEM PROVIDED HISTORY: fever TECHNOLOGIST PROVIDED HISTORY: fever Reason for Exam: Fever FINDINGS: The cardiomediastinal silhouette is normal in size and contour. The lungs demonstrate mild bibasilar atelectasis versus scarring. No pleural effusion or pneumothorax is present. No acute cardiopulmonary process. Minimal bibasilar atelectasis         Physical Examination:        Physical Exam  Constitutional:       Appearance: She is obese. Cardiovascular:      Rate and Rhythm: Normal rate and regular rhythm. Pulses: Normal pulses. Heart sounds: Normal heart sounds. No murmur heard. No gallop. Pulmonary:      Effort: Pulmonary effort is normal.      Breath sounds: Normal breath sounds. No wheezing, rhonchi or rales. Abdominal:      General: Bowel sounds are normal. There is no distension. Palpations: Abdomen is soft. Tenderness: There is no abdominal tenderness. Musculoskeletal:      Right lower leg: No edema. Left lower leg: No edema. Skin:     General: Skin is warm. Neurological:      Mental Status: She is alert.        Assessment:        Primary Problem  Back pain    Active Hospital Problems    Diagnosis Date Noted    Class 2 obesity in adult [E66.9] 06/07/2022     Priority: Medium    Toxic metabolic encephalopathy [L35.9] 06/06/2022     Priority: Medium    Back pain [M54.9] 05/31/2022     Priority: Medium    Diabetes mellitus type 2, uncontrolled ([facility] [address] [E11.65] 10/12/2014    Dyslipidemia [E78.5] 10/12/2014    DIEGO (nonalcoholic steatohepatitis) [K75.81] 10/12/2014    Hypertension [I10]        Plan:          6/8/22    Awaiting placement  We will add Flexeril at bedtime. Katharine Mcintyre MD  PGY-3 Internal Medicine Resident  [address] [place] [address]  6/8/2022 12:58 PM    Attestation and add on       I have discussed the care of [address] , including pertinent history and exam findings,      6/8/22    with the resident. I have seen and examined the patient and the key elements of all parts of the encounter have been performed by me . I agree with the assessment, plan and orders as documented by the resident. Hospital Problems           Last Modified POA    * (Principal) Back pain 5/31/2022 Yes    Toxic metabolic encephalopathy 3/1/8695 Yes    Class 2 obesity in adult 6/7/2022 Yes    Hypertension 5/31/2022 Yes    DIEGO (nonalcoholic steatohepatitis) 5/31/2022 Yes    Dyslipidemia 5/31/2022 Yes    Diabetes mellitus type 2, uncontrolled ([facility] [address] 5/31/2022 Yes             ''''''''''       MD JOSÉ Azul[address], [address] [place] [address].    Phone [phone]   Fax: [phone]  Answering Service: [phone]

## 2022-06-08 NOTE — PROGRESS NOTES
Physical Therapy  Facility/Department: [facility] [[email or web address]] MED SURG  Daily Treatment Note  NAME: Farhana Diaz  : 1960  MRN: [id number]    Date of Service: 2022    Discharge Recommendations:  Patient would benefit from continued therapy after discharge   PT Equipment Recommendations  Equipment Needed: Yes  Walker: Rolling    Patient Diagnosis(es): The primary encounter diagnosis was Sciatica of left side. A diagnosis of Low back pain with sciatica, sciatica laterality unspecified, unspecified back pain laterality, unspecified chronicity was also pertinent to this visit. Assessment   Activity Tolerance: Patient limited by pain; Patient limited by endurance  Equipment Needed: Yes     Plan    Plan  Plan: 1 time a day 7 days a week     Restrictions  Restrictions/Precautions  Restrictions/Precautions: General Precautions  Position Activity Restriction  Other position/activity restrictions: back pain limiting mobility     Subjective    Subjective  Subjective: Pt seated in reclining chair with reports of numbness in B hands. , LEELA banks's tx and pt is agreeable to co-tx with Yu Jovel from OT.    Orientation  Overall Orientation Status: Within Normal Limits  Cognition  Overall Cognitive Status: WNL     Objective   Bed Mobility Training  Bed Mobility Training: No  Balance  Sitting: Without support  Standing: With support  Transfer Training  Transfer Training: Yes  Interventions: Safety awareness training;Verbal cues  Sit to Stand: Contact-guard assistance  Stand to Sit: Minimum assistance (for contolled decent)  Stand Pivot Transfers: Contact-guard assistance (VC for minimizing twisting of the spine. )  Gait Training  Gait Training: Yes  Gait  Interventions: Safety awareness training;Verbal cues  Base of Support: Widened  Speed/Kaelyn: Slow;Shuffled  Step Length: Left shortened;Right shortened  Swing Pattern: Left asymmetrical;Right asymmetrical  Stance: Left decreased;Right decreased  Gait Abnormalities: Decreased step clearance; Path deviations; Shuffling gait;Trunk sway increased  Distance (ft): 30 Feet  Assistive Device: Walker, rolling     PT Exercises  Exercise Treatment: ISO quads in sitting 3sec hold x10   A/AROM Exercises: Seated bilat LE exercises to pt tolerance with encoragment to perform throughout day as tolerable  Dynamic Sitting Balance Exercises: Seated in recliner performing reaching OOBOS             Goals  Short Term Goals  Time Frame for Short term goals: 1-2 visits  Short term goal 1: gait with RW mod-I x 50-75ft to ambulate safely within home  Short term goal 2: negotiate flight of stairs with rail and SBA to safely enter home    Education  Patient Education  Education Given To: Patient  Education Provided: Energy Conservation;Transfer Training; Fall Prevention Strategies;Precautions  Education Provided Comments: importance of B TKE for mobiltiy, Energy conservation techniques.    Education Method: Demonstration;Verbal (Pt unable to teachback from previous tx )  Barriers to Learning: None  Education Outcome: Verbalized understanding     Safety measures utilized: Gait belt, Call light within reach and Sitting in chair    Therapy Time         06/08/22 1114   PT Individual Minutes   Time In 1114   Time Out 1142   Minutes Solacody Sevilla [address] [place] [address]

## 2022-06-09 ENCOUNTER — TELEPHONE (OUTPATIENT)
Dept: FAMILY MEDICINE CLINIC | Age: 62
End: 2022-06-09

## 2022-06-09 LAB
ANION GAP SERPL CALCULATED.3IONS-SCNC: 9 MMOL/L (ref 9–17)
BUN BLDV-MCNC: 27 MG/DL (ref 8–23)
CALCIUM SERPL-MCNC: 8.9 MG/DL (ref 8.6–10.4)
CHLORIDE BLD-SCNC: 106 MMOL/L (ref 98–107)
CO2: 25 MMOL/L (ref 20–31)
CREAT SERPL-MCNC: 0.94 MG/DL (ref 0.5–0.9)
GFR AFRICAN AMERICAN: >60 ML/MIN
GFR NON-AFRICAN AMERICAN: >60 ML/MIN
GFR SERPL CREATININE-BSD FRML MDRD: ABNORMAL ML/MIN/{1.73_M2}
GLUCOSE BLD-MCNC: 136 MG/DL (ref 65–105)
GLUCOSE BLD-MCNC: 148 MG/DL (ref 70–99)
GLUCOSE BLD-MCNC: 150 MG/DL (ref 65–105)
GLUCOSE BLD-MCNC: 237 MG/DL (ref 65–105)
GLUCOSE BLD-MCNC: 269 MG/DL (ref 65–105)
HCT VFR BLD CALC: 29.4 % (ref 36–46)
HEMOGLOBIN: 9.3 G/DL (ref 12–16)
MCH RBC QN AUTO: 28.5 PG (ref 26–34)
MCHC RBC AUTO-ENTMCNC: 31.8 G/DL (ref 31–37)
MCV RBC AUTO: 89.6 FL (ref 80–100)
METANEPH/PLASMA INTERP: ABNORMAL
METANEPHRINE: 0.27 NMOL/L (ref 0–0.49)
NORMETANEPHRINE PLASMA: 1.49 NMOL/L (ref 0–0.89)
PDW BLD-RTO: 13.6 % (ref 11.5–14.9)
PLATELET # BLD: 290 K/UL (ref 150–450)
PMV BLD AUTO: 8 FL (ref 6–12)
POTASSIUM SERPL-SCNC: 4.5 MMOL/L (ref 3.7–5.3)
RBC # BLD: 3.28 M/UL (ref 4–5.2)
SODIUM BLD-SCNC: 140 MMOL/L (ref 135–144)
WBC # BLD: 7.8 K/UL (ref 3.5–11)

## 2022-06-09 PROCEDURE — 6370000000 HC RX 637 (ALT 250 FOR IP)

## 2022-06-09 PROCEDURE — 6370000000 HC RX 637 (ALT 250 FOR IP): Performed by: STUDENT IN AN ORGANIZED HEALTH CARE EDUCATION/TRAINING PROGRAM

## 2022-06-09 PROCEDURE — 1200000000 HC SEMI PRIVATE

## 2022-06-09 PROCEDURE — 85027 COMPLETE CBC AUTOMATED: CPT

## 2022-06-09 PROCEDURE — 6370000000 HC RX 637 (ALT 250 FOR IP): Performed by: INTERNAL MEDICINE

## 2022-06-09 PROCEDURE — 97116 GAIT TRAINING THERAPY: CPT

## 2022-06-09 PROCEDURE — 80048 BASIC METABOLIC PNL TOTAL CA: CPT

## 2022-06-09 PROCEDURE — 36415 COLL VENOUS BLD VENIPUNCTURE: CPT

## 2022-06-09 PROCEDURE — 99232 SBSQ HOSP IP/OBS MODERATE 35: CPT | Performed by: INTERNAL MEDICINE

## 2022-06-09 PROCEDURE — 6360000002 HC RX W HCPCS: Performed by: INTERNAL MEDICINE

## 2022-06-09 PROCEDURE — 82947 ASSAY GLUCOSE BLOOD QUANT: CPT

## 2022-06-09 RX ORDER — GABAPENTIN 300 MG/1
300 CAPSULE ORAL 3 TIMES DAILY
Qty: 90 CAPSULE | Refills: 0 | Status: SHIPPED | OUTPATIENT
Start: 2022-06-09 | End: 2022-07-09

## 2022-06-09 RX ORDER — ROPINIROLE 0.5 MG/1
1 TABLET, FILM COATED ORAL NIGHTLY
Status: DISCONTINUED | OUTPATIENT
Start: 2022-06-09 | End: 2022-06-10 | Stop reason: HOSPADM

## 2022-06-09 RX ORDER — DOCUSATE SODIUM 100 MG/1
100 CAPSULE, LIQUID FILLED ORAL 2 TIMES DAILY
Qty: 60 CAPSULE | Refills: 0 | Status: SHIPPED | OUTPATIENT
Start: 2022-06-09 | End: 2022-07-09

## 2022-06-09 RX ORDER — OXYCODONE HYDROCHLORIDE AND ACETAMINOPHEN 5; 325 MG/1; MG/1
1 TABLET ORAL EVERY 6 HOURS PRN
Qty: 12 TABLET | Refills: 0 | Status: SHIPPED | OUTPATIENT
Start: 2022-06-09 | End: 2022-06-12

## 2022-06-09 RX ORDER — CYCLOBENZAPRINE HCL 5 MG
5 TABLET ORAL NIGHTLY
Qty: 10 TABLET | Refills: 0 | Status: SHIPPED | OUTPATIENT
Start: 2022-06-09 | End: 2022-06-19

## 2022-06-09 RX ORDER — ROPINIROLE 1 MG/1
1 TABLET, FILM COATED ORAL NIGHTLY
Qty: 90 TABLET | Refills: 3 | Status: SHIPPED | OUTPATIENT
Start: 2022-06-09 | End: 2022-07-15

## 2022-06-09 RX ORDER — BUSPIRONE HYDROCHLORIDE 10 MG/1
10 TABLET ORAL 2 TIMES DAILY
Qty: 60 TABLET | Refills: 0 | Status: SHIPPED | OUTPATIENT
Start: 2022-06-09 | End: 2022-07-18

## 2022-06-09 RX ADMIN — OXYCODONE HYDROCHLORIDE AND ACETAMINOPHEN 1 TABLET: 5; 325 TABLET ORAL at 23:32

## 2022-06-09 RX ADMIN — OXYCODONE HYDROCHLORIDE AND ACETAMINOPHEN 1 TABLET: 5; 325 TABLET ORAL at 01:30

## 2022-06-09 RX ADMIN — DOCUSATE SODIUM 100 MG: 100 CAPSULE, LIQUID FILLED ORAL at 20:43

## 2022-06-09 RX ADMIN — BUSPIRONE HYDROCHLORIDE 10 MG: 10 TABLET ORAL at 08:33

## 2022-06-09 RX ADMIN — DOCUSATE SODIUM 100 MG: 100 CAPSULE, LIQUID FILLED ORAL at 08:33

## 2022-06-09 RX ADMIN — INSULIN LISPRO 3 UNITS: 100 INJECTION, SOLUTION INTRAVENOUS; SUBCUTANEOUS at 12:04

## 2022-06-09 RX ADMIN — ASPIRIN 81 MG: 81 TABLET, COATED ORAL at 08:33

## 2022-06-09 RX ADMIN — ROPINIROLE HYDROCHLORIDE 1 MG: 0.5 TABLET, FILM COATED ORAL at 20:36

## 2022-06-09 RX ADMIN — OXYCODONE HYDROCHLORIDE AND ACETAMINOPHEN 1 TABLET: 5; 325 TABLET ORAL at 15:50

## 2022-06-09 RX ADMIN — GABAPENTIN 300 MG: 300 CAPSULE ORAL at 08:33

## 2022-06-09 RX ADMIN — HEPARIN SODIUM 5000 UNITS: 5000 INJECTION INTRAVENOUS; SUBCUTANEOUS at 05:49

## 2022-06-09 RX ADMIN — INSULIN LISPRO 1 UNITS: 100 INJECTION, SOLUTION INTRAVENOUS; SUBCUTANEOUS at 16:55

## 2022-06-09 RX ADMIN — HEPARIN SODIUM 5000 UNITS: 5000 INJECTION INTRAVENOUS; SUBCUTANEOUS at 14:02

## 2022-06-09 RX ADMIN — BUSPIRONE HYDROCHLORIDE 10 MG: 10 TABLET ORAL at 20:43

## 2022-06-09 RX ADMIN — GABAPENTIN 300 MG: 300 CAPSULE ORAL at 14:02

## 2022-06-09 RX ADMIN — ACETAMINOPHEN 500 MG: 500 TABLET ORAL at 05:49

## 2022-06-09 RX ADMIN — ACETAMINOPHEN 500 MG: 500 TABLET ORAL at 00:30

## 2022-06-09 RX ADMIN — GABAPENTIN 300 MG: 300 CAPSULE ORAL at 20:43

## 2022-06-09 RX ADMIN — PANTOPRAZOLE SODIUM 40 MG: 40 TABLET, DELAYED RELEASE ORAL at 05:49

## 2022-06-09 RX ADMIN — INSULIN LISPRO 1 UNITS: 100 INJECTION, SOLUTION INTRAVENOUS; SUBCUTANEOUS at 20:47

## 2022-06-09 RX ADMIN — CYCLOBENZAPRINE 5 MG: 10 TABLET, FILM COATED ORAL at 20:36

## 2022-06-09 RX ADMIN — INSULIN GLARGINE 12 UNITS: 100 INJECTION, SOLUTION SUBCUTANEOUS at 08:34

## 2022-06-09 RX ADMIN — OXYCODONE HYDROCHLORIDE AND ACETAMINOPHEN 1 TABLET: 5; 325 TABLET ORAL at 08:33

## 2022-06-09 ASSESSMENT — PAIN DESCRIPTION - LOCATION
LOCATION: BACK

## 2022-06-09 ASSESSMENT — PAIN DESCRIPTION - DESCRIPTORS
DESCRIPTORS: THROBBING
DESCRIPTORS: THROBBING

## 2022-06-09 ASSESSMENT — PAIN DESCRIPTION - ORIENTATION
ORIENTATION: MID;LOWER
ORIENTATION: LOWER;LEFT
ORIENTATION: LEFT

## 2022-06-09 ASSESSMENT — PAIN SCALES - GENERAL
PAINLEVEL_OUTOF10: 9
PAINLEVEL_OUTOF10: 7
PAINLEVEL_OUTOF10: 8
PAINLEVEL_OUTOF10: 9

## 2022-06-09 ASSESSMENT — ENCOUNTER SYMPTOMS
ABDOMINAL DISTENTION: 0
VOMITING: 0
ABDOMINAL PAIN: 0
BACK PAIN: 1
NAUSEA: 0
SHORTNESS OF BREATH: 0
DIARRHEA: 0
CONSTIPATION: 0

## 2022-06-09 ASSESSMENT — PAIN DESCRIPTION - FREQUENCY: FREQUENCY: CONTINUOUS

## 2022-06-09 ASSESSMENT — PAIN DESCRIPTION - PAIN TYPE: TYPE: CHRONIC PAIN

## 2022-06-09 ASSESSMENT — PAIN DESCRIPTION - ONSET: ONSET: ON-GOING

## 2022-06-09 NOTE — FLOWSHEET NOTE
Patient was sitting in chair and was alert and more like herself, although still in considerable pain. She is anxiously awaiting d/c to [facility] for rehab. Writer provided emotional support and prayer. 06/09/22 1802   Encounter Summary   Encounter Overview/Reason  Spiritual/Emotional Needs   Service Provided For: Patient   Referral/Consult From: Manish   Last Encounter  06/09/22   Complexity of Encounter Moderate   Spiritual/Emotional needs   Type Spiritual Support   Assessment/Intervention/Outcome   Assessment Anxious; Concerns with suffering   Intervention Active listening;Explored/Affirmed feelings, thoughts, concerns; Discussed illness injury and its impact; Explored Coping Skills/Resources;Prayer (assurance of)/[place];Sustaining Presence/Ministry of presence   Outcome Engaged in conversation;Expressed feelings, needs, and concerns;Expressed Gratitude;Receptive

## 2022-06-09 NOTE — PROGRESS NOTES
Physical Therapy  Facility/Department: [facility] MED SURG  Daily Treatment Note  NAME: Raghav Fitzpatrick  : 1960  MRN: [id number]    Date of Service: 2022    Discharge Recommendations:  Patient would benefit from continued therapy after discharge   PT Equipment Recommendations  Equipment Needed: Yes  Walker: Rolling    Patient Diagnosis(es): The primary encounter diagnosis was Sciatica of left side. Diagnoses of Low back pain with sciatica, sciatica laterality unspecified, unspecified back pain laterality, unspecified chronicity, Daytime somnolence, Class 2 obesity due to excess calories with body mass index (BMI) of 39.0 to 39.9 in adult, unspecified whether serious comorbidity present, and Fatigue, unspecified type were also pertinent to this visit. Assessment   Activity Tolerance: Patient limited by pain; Patient limited by endurance  Equipment Needed: Yes     Plan    Plan  Plan: 1 time a day 7 days a week     Restrictions  Restrictions/Precautions  Restrictions/Precautions: General Precautions  Position Activity Restriction  Other position/activity restrictions: back pain limiting mobility     Subjective    Subjective  Subjective: Pt seated EOB reporting \"I feel like we over did it yesterday\".  RN Yani Shepard ok's tx and pt is agreeable  Orientation  Overall Orientation Status: Within Normal Limits  Cognition  Overall Cognitive Status: WNL     Objective      Bed Mobility Training  Bed Mobility Training: No  Balance  Sitting: Without support  Standing: With support  Transfer Training  Transfer Training: Yes (with RW )  Sit to Stand: Contact-guard assistance  Stand to Sit: Contact-guard assistance  Stand Pivot Transfers: Contact-guard assistance  Bed to Chair: Contact-guard assistance  Gait Training  Gait Training: Yes  Gait  Overall Level of Assistance: Contact-guard assistance  Base of Support: Widened  Speed/Kaelyn: Slow;Shuffled  Step Length: Left shortened;Right shortened  Swing Pattern: Left asymmetrical;Right asymmetrical  Stance: Left decreased;Right decreased  Gait Abnormalities: Decreased step clearance; Path deviations; Shuffling gait;Trunk sway increased  Assistive Device: Walker, rolling     PT Exercises  A/AROM Exercises: Seated R LE ex's x10 , pt unable to tolerate ex's on L LE           Goals  Short Term Goals  Time Frame for Short term goals: 1-2 visits  Short term goal 1: gait with RW mod-I x 50-75ft to ambulate safely within home  Short term goal 2: negotiate flight of stairs with rail and SBA to safely enter home    Safety measures utilized: Gait belt, Call light within reach and Sitting in chair    Therapy Time   Individual Concurrent Group Co-treatment   Time In 0942         Time Out 0956         Minutes [address] [place] [address]  [place]

## 2022-06-09 NOTE — PROGRESS NOTES
[facility]    PROGRESS NOTE             6/9/2022    10:25 AM    Name:   Kaylene Baker  MRN:     [id number]     Acct:      [de-identified]   Room:   2076/2076-01   Day:  3  Admit Date:  5/31/2022  5:16 AM    PCP:  Ryan Cardozo MD  Code Status:  Full Code    Subjective:     C/C:   Chief Complaint   Patient presents with    Back Pain     Interval History Status: improved. 6/8/22    · Patient continues to complain of back pain. · Will add Flexeril at bedtime. · Vitals stable, blood pressure 158/82  · Labs reviewed, blood sugar in 200s this morning. · Elevated CRP and CK levels. · Unremarkable renal ultrasound however it does show hepatic steatosis  · Minimal urine output. · Awaiting placement             Review of Systems:     Review of Systems   Constitutional: Negative for chills and fever. Respiratory: Negative for shortness of breath. Cardiovascular: Negative for chest pain, palpitations and leg swelling. Gastrointestinal: Negative for abdominal distention, abdominal pain, constipation, diarrhea, nausea and vomiting. Genitourinary: Negative for dysuria. Musculoskeletal: Positive for back pain. Neurological: Negative for dizziness. Psychiatric/Behavioral: Negative for agitation. Medications: Allergies:     Allergies   Allergen Reactions    Codeine Nausea And Vomiting       Current Meds:   Scheduled Meds:    cyclobenzaprine  5 mg Oral Nightly    insulin glargine  12 Units SubCUTAneous Daily    gabapentin  300 mg Oral TID    heparin (porcine)  5,000 Units SubCUTAneous 3 times per day    docusate sodium  100 mg Oral BID    [Held by provider] oxyCODONE  10 mg Oral 2 times per day    aspirin  81 mg Oral Daily    latanoprost  1 drop Both Eyes Nightly    busPIRone  10 mg Oral BID    [Held by provider] chlorthalidone  25 mg Oral Daily    pantoprazole  40 mg Oral QAM AC    [Held by provider] lisinopril  40 mg Oral Daily    [Held by provider] rOPINIRole  2 mg Oral Nightly    [Held by provider] rosuvastatin  20 mg Oral Nightly    insulin lispro  0-6 Units SubCUTAneous TID WC    insulin lispro  0-3 Units SubCUTAneous Nightly     Continuous Infusions:    dextrose       PRN Meds: oxyCODONE-acetaminophen, midodrine, hydrALAZINE, perflutren lipid microspheres, glucose, dextrose bolus **OR** dextrose bolus, glucagon (rDNA), dextrose, acetaminophen, albuterol sulfate HFA    Data:     Past Medical History:   has a past medical history of Cervical spondylosis, Generalized anxiety disorder, Hyperlipidemia, Hypertension, Lumbar radiculopathy, DIEGO (nonalcoholic steatohepatitis), Obesity, Osteoarthritis of left knee, Restless legs syndrome, and Type II or unspecified type diabetes mellitus without mention of complication, not stated as uncontrolled. Social History:   reports that she has never smoked. She has never used smokeless tobacco. She reports current alcohol use. She reports that she does not use drugs. Family History:   Family History   Problem Relation Age of Onset    Heart Attack Mother     Diabetes Mother     Diabetes Father     Heart Attack Father        Vitals:  BP (!) 140/77   Pulse 76   Temp 97.7 °F (36.5 °C)   Resp 18   Wt 216 lb 7.9 oz (98.2 kg)   SpO2 97%   BMI 39.60 kg/m²   Temp (24hrs), Av.7 °F (36.5 °C), Min:97.7 °F (36.5 °C), Max:97.8 °F (36.6 °C)    Recent Labs     22  1114 22  1600 22  0612   POCGLU 210* 204* 253* 136*       I/O(24Hr):     Intake/Output Summary (Last 24 hours) at 2022 1025  Last data filed at 2022 0400  Gross per 24 hour   Intake 2938.45 ml   Output 2050 ml   Net 888.45 ml       Labs:  [unfilled]    Lab Results   Component Value Date/Time    SPECIAL NOT REPORTED 10/07/2021 06:24 PM     Lab Results   Component Value Date/Time    CULTURE NO SIGNIFICANT GROWTH 10/07/2021 06:24 PM [facility]    Radiology:    ECHO Complete 2D W Doppler W Color    Result Date: 6/6/2022  [facility] Transthoracic Echocardiography Report (TTE)  Patient Name Ann Rosado Date of Study                 06/06/2022               LEE ANN CARRASQUILLO   Date of      1960  Gender                        Female  Birth   Age          65 year(s)  Race                             Room Number  2011   Corporate ID [id number]    Weight:                       216 pounds, 98 kg  #   Patient Alee Jerez [de-identified]  #   MR #         [id number]      Sonographer                   Marisol Jorge   Accession #  [id number]  Interpreting Physician        Tawny Zelaya   Fellow                   Referring Nurse Practitioner   Interpreting             Referring Physician           Venu Lorenzo  Fellow                                                 Anastacia Huitron  Type of Study   TTE procedure:2D Echocardiogram, M-Mode, Doppler, Color Doppler. Procedure Date Date: 06/06/2022 Start: 02:10 PM Study Location: [address] [place] [address] Technical Quality: Fair visualization Indications:Congestive heart failure. Patient Status: Inpatient Weight: 216 pounds Rhythm: Sinus tachycardia HR: 111 bpm BP: 111/61 mmHg CONCLUSIONS Summary Technically difficult study. Normal LV size. Hyperdynamic left ventricular function. Estimated LV EF 65-70 %. Normal wall thickness. No segmental wall motion abnormalities. Grade I (mild) left ventricular diastolic dysfunction. Normal right ventricular size and function. Left atrium is normal in size. Right atrium was not well visualized. Aortic valve was not well visualized. No aortic stenosis. No aortic insufficiency. Normal aortic root dimension. Normal mitral valve structure and function. No significant mitral regurgitation. Tricuspid valve was not well visualized. Insignificant tricuspid regurgitation, unable to estimate RVSP. IVC not well visualized. No significant pericardial effusion is seen. Signature ----------------------------------------------------------------------------  Electronically signed by Tawny Zelaya(Interpreting physician) on  2022 05:07 PM ---------------------------------------------------------------------------- ----------------------------------------------------------------------------  Electronically signed by Marisol Jorge(Sonographer) on 2022 02:37  PM ---------------------------------------------------------------------------- FINDINGS Left Atrium Left atrium is normal in size. Left Ventricle Small LV cavity. Hyperdynamic left ventricular function. Estimated LV EF 65-70 %. Normal wall thickness. No segmental wall motion abnormalities. Grade I (mild) left ventricular diastolic dysfunction. Right Atrium Right atrium was not well visualized. Right Ventricle Normal right ventricular size and function. Mitral Valve Normal mitral valve structure and function. No significant mitral regurgitation. Aortic Valve Aortic valve was not well visualized. No aortic insufficiency. No aortic stenosis. Tricuspid Valve Tricuspid valve was not well visualized. Insignificant tricuspid regurgitation, unable to estimate RVSP. Pulmonic Valve Pulmonic valve not well visualized but Doppler velocities are normal. No pulmonic insufficiency. Pericardial Effusion No significant pericardial effusion is seen. Miscellaneous Normal aortic root dimension. E/e' average 7.4 IVC not well visualized.  M-mode / 2D Measurements & Calculations:   LVIDd:3.19 cm(3.7 - 5.6 cm)      Diastolic ZNBOAC:51.5 ml  NGQYA:0.27 cm(2.2 - 4.0 cm)      Systolic PKDIBD:9.54 ml  MM.34 cm(0.6 - 1.1 cm)       Aortic Root:2.4 cm(2.0 - 3.7 cm)  LVPWd:1.07 cm(0.6 - 1.1 cm)      LA Dimension: 2.9 cm(1.9 - 4.0 cm)  Fractional Shortenin.81 %    LA volume/Index: 17.7 ml  Calculated LVEF (%): 78.22 %     LVOT:1.9 cm   Mitral:                              Aortic   Peak E-Wave: 0.61 m/s                Peak Velocity: 1.69 m/s Peak A-Wave: 0.86 m/s                Mean Velocity: 1.22 m/s  E/A Ratio: 0.71                      Peak Gradient: 11.42 mmHg  Peak Gradient: 1.47 mmHg             Mean Gradient: 7 mmHg  Deceleration Time: 162 msec                                        Area (continuity): 2.44 cm^2                                       AV VTI: 27.1 cm  Septal Wall E' velocity:0.08 m/s Lateral Wall E' velocity:0.08 m/s    XR LUMBAR SPINE (2-3 VIEWS)    Result Date: 5/31/2022  EXAMINATION: THREE XRAY VIEWS OF THE LUMBAR SPINE 5/31/2022 4:27 pm COMPARISON: None. HISTORY: ORDERING SYSTEM PROVIDED HISTORY: Lower back Pain TECHNOLOGIST PROVIDED HISTORY: Lower back Pain Reason for Exam: PT CO acute lower back pain X several days with NKI per pt. FINDINGS: There is a mild dextroscoliosis of the lower lumbar spine. There are 5 lumbar segments There is mild anterior spurring of the visualized lumbar vertebral bodies There is moderate L5-S1 joint space compromise. The remaining disc spaces and vertical heights of the vertebral bodies are maintained There is no pedicular/pars interarticularis defect, fracture or dislocation seen     Scoliosis and L5-S1 degenerative changes     MRI LUMBAR SPINE WO CONTRAST    Result Date: 6/2/2022  EXAMINATION: MRI OF THE LUMBAR SPINE WITHOUT CONTRAST, 6/2/2022 12:46 pm TECHNIQUE: Multiplanar multisequence MRI of the lumbar spine was performed without the administration of intravenous contrast. COMPARISON: 03/22/2022 HISTORY: ORDERING SYSTEM PROVIDED HISTORY: lumbar left radicular pain FINDINGS: BONES/ALIGNMENT: There is loss of the normal lordotic curvature. The vertebral body heights are maintained. The bone marrow signal appears unremarkable. Mild degenerative disc height loss at L5-S1. Mild multilevel facet arthropathy. SPINAL CORD: The conus terminates normally. SOFT TISSUES: No paraspinal mass identified.  L1-L2: There is no significant disc herniation, spinal canal stenosis or neural foraminal narrowing. L2-L3: There is no significant disc herniation, spinal canal stenosis or neural foraminal narrowing. L3-L4: There is no significant disc herniation, spinal canal stenosis or neural foraminal narrowing. L4-L5: There is no significant disc herniation, spinal canal stenosis or neural foraminal narrowing. L5-S1: Disc bulging with asymmetric encroachment on the left lateral recess and abutment of the transversing left S1 nerve root. No significant foraminal stenosis. No significant change compared to MR lumbar spine from 03/22/2022. Disc bulging at L5-S1 with asymmetric encroachment on the left lateral recess and abutment of the transversing left S1 nerve root. XR CHEST PORTABLE    Result Date: 6/5/2022  EXAMINATION: ONE XRAY VIEW OF THE CHEST 6/5/2022 6:35 pm COMPARISON: 02/04/2021 HISTORY: ORDERING SYSTEM PROVIDED HISTORY: fever TECHNOLOGIST PROVIDED HISTORY: fever Reason for Exam: Fever FINDINGS: The cardiomediastinal silhouette is normal in size and contour. The lungs demonstrate mild bibasilar atelectasis versus scarring. No pleural effusion or pneumothorax is present. No acute cardiopulmonary process. Minimal bibasilar atelectasis         Physical Examination:        Physical Exam  Constitutional:       Appearance: She is obese. Cardiovascular:      Rate and Rhythm: Normal rate and regular rhythm. Pulses: Normal pulses. Heart sounds: Normal heart sounds. No murmur heard. No gallop. Pulmonary:      Effort: Pulmonary effort is normal.      Breath sounds: Normal breath sounds. No wheezing, rhonchi or rales. Abdominal:      General: Bowel sounds are normal. There is no distension. Palpations: Abdomen is soft. Tenderness: There is no abdominal tenderness. Musculoskeletal:      Right lower leg: No edema. Left lower leg: No edema. Skin:     General: Skin is warm. Neurological:      Mental Status: She is alert.        Assessment:        Primary Problem  Back pain    Active Hospital Problems    Diagnosis Date Noted    Class 2 obesity in adult [E66.9] 06/07/2022     Priority: Medium    Toxic metabolic encephalopathy [H61.8] 06/06/2022     Priority: Medium    Back pain [M54.9] 05/31/2022     Priority: Medium    Diabetes mellitus type 2, uncontrolled ([facility] [address] [E11.65] 10/12/2014    Dyslipidemia [E78.5] 10/12/2014    DIEGO (nonalcoholic steatohepatitis) [K75.81] 10/12/2014    Hypertension [I10]        Plan:          6/8/22    · Awaiting placement  · We will add Flexeril at bedtime. Did not sleep well .  Has restless leg syndrome   Resume Mahamed Gibbs MD

## 2022-06-09 NOTE — PROGRESS NOTES
Patient medically clear for discharge once precert obtained. JETHRO signed and RX for gabapentin and percocet in the chart.  Electronically signed by Meghan Slater RN on 6/9/2022 at 11:10 AM

## 2022-06-09 NOTE — PROGRESS NOTES
[address]    OCCUPATIONAL THERAPY MISSED TREATMENT NOTE   INPATIENT   Date: 22  Patient Name: Jose Vance       Room:   MRN: [id number]   Account #: [de-identified]    : 1960  (65 y.o.)  Gender: female   Diagnosis: Low back pain with sciatica LLE buttocks to foot. Lumbar MRI completed  showing \" Disc bulging at L5-S1 with asymmetric encroachment on the left lateral recessand abutment of the transversing left S1 nerve root\" which is not a significant change from MRI in March. pt notes numbness digits 4 + 5 LUE and LLE. REASON FOR MISSED TREATMENT:  Okay for OT treatment per RN Galen Ramirez. Pt was sound asleep upon arrival. OT will re-attempt as able.  [address], OT

## 2022-06-09 NOTE — PLAN OF CARE
Problem: Discharge Planning  Goal: Discharge to home or other facility with appropriate resources  6/9/2022 0407 by Jackie Koenig RN  Outcome: Progressing  Flowsheets (Taken 6/8/2022 2000)  Discharge to home or other facility with appropriate resources: Identify barriers to discharge with patient and caregiver  6/8/2022 1708 by Robbie Mathews RN  Outcome: Progressing  Flowsheets (Taken 6/8/2022 1708)  Discharge to home or other facility with appropriate resources:   Identify barriers to discharge with patient and caregiver   Arrange for needed discharge resources and transportation as appropriate   Identify discharge learning needs (meds, wound care, etc)   Arrange for interpreters to assist at discharge as needed   Refer to discharge planning if patient needs post-hospital services based on physician order or complex needs related to functional status, cognitive ability or social support system  Note: Discharge planners following for further discharge needs      Problem: Safety - Adult  Goal: Free from fall injury  6/9/2022 0407 by Jackie Koenig RN  Outcome: Progressing  6/8/2022 1708 by Robbie Mathews RN  Outcome: Progressing  Flowsheets (Taken 6/8/2022 1708)  Free From Fall Injury:   Instruct family/caregiver on patient safety   Based on caregiver fall risk screen, instruct family/caregiver to ask for assistance with transferring infant if caregiver noted to have fall risk factors  Note: No falls this shift. Call light with in reach, side rails up x2, bed in lowest postion. Patients safety maintained.       Problem: Pain  Goal: Verbalizes/displays adequate comfort level or baseline comfort level  6/9/2022 0407 by Jackie Koenig RN  Outcome: Progressing  6/8/2022 1708 by Robbie Mathews RN  Outcome: Progressing  Flowsheets (Taken 6/8/2022 1708)  Verbalizes/displays adequate comfort level or baseline comfort level:   Encourage patient to monitor pain and request assistance   Assess pain using appropriate pain scale   Administer analgesics based on type and severity of pain and evaluate response   Implement non-pharmacological measures as appropriate and evaluate response   Consider cultural and social influences on pain and pain management   Notify Licensed Independent Practitioner if interventions unsuccessful or patient reports new pain  Note: Patients pain level decreased with prescribed pain medications.       Problem: Chronic Conditions and Co-morbidities  Goal: Patient's chronic conditions and co-morbidity symptoms are monitored and maintained or improved  Recent Flowsheet Documentation  Taken 6/8/2022 2000 by Susan Galicia RN  Care Plan - Patient's Chronic Conditions and Co-Morbidity Symptoms are Monitored and Maintained or Improved: Monitor and assess patient's chronic conditions and comorbid symptoms for stability, deterioration, or improvement  6/8/2022 1708 by Sanchez Rai RN  Outcome: Progressing

## 2022-06-09 NOTE — TELEPHONE ENCOUNTER
Patient called back, she is currently admitted in hospital and will be discharged to rehab. She has no ability to come in and sign a ERNIE. Patient is requesting if ERNIE can be emailed to her  at: [email or web address] She will sign it then have it faxed back. Is this possible? Please advise.

## 2022-06-09 NOTE — PLAN OF CARE
Problem: Discharge Planning  Goal: Discharge to home or other facility with appropriate resources  6/9/2022 1714 by Brooke Constantino RN  Outcome: Progressing  Flowsheets (Taken 6/9/2022 1714)  Discharge to home or other facility with appropriate resources:   Identify barriers to discharge with patient and caregiver   Arrange for needed discharge resources and transportation as appropriate   Identify discharge learning needs (meds, wound care, etc)   Arrange for interpreters to assist at discharge as needed   Refer to discharge planning if patient needs post-hospital services based on physician order or complex needs related to functional status, cognitive ability or social support system  Note: Discharge planners following for further discharge needs   6/9/2022 0407 by Neymar Escobedo RN  Outcome: Progressing  Flowsheets (Taken 6/8/2022 2000)  Discharge to home or other facility with appropriate resources: Identify barriers to discharge with patient and caregiver     Problem: Safety - Adult  Goal: Free from fall injury  6/9/2022 1714 by Brooke Constantino RN  Outcome: Progressing  Flowsheets (Taken 6/9/2022 1714)  Free From Fall Injury:   Instruct family/caregiver on patient safety   Based on caregiver fall risk screen, instruct family/caregiver to ask for assistance with transferring infant if caregiver noted to have fall risk factors  Note: No falls this shift. Call light with in reach, side rails up x2, bed in lowest postion. Patients safety maintained.    6/9/2022 0407 by Neymar Escobedo RN  Outcome: Progressing     Problem: Pain  Goal: Verbalizes/displays adequate comfort level or baseline comfort level  6/9/2022 1714 by Brooke Constantino RN  Outcome: Progressing  Flowsheets (Taken 6/9/2022 1714)  Verbalizes/displays adequate comfort level or baseline comfort level:   Assess pain using appropriate pain scale   Administer analgesics based on type and severity of pain and evaluate response   Encourage patient to monitor pain and request assistance   Implement non-pharmacological measures as appropriate and evaluate response   Consider cultural and social influences on pain and pain management   Notify Licensed Independent Practitioner if interventions unsuccessful or patient reports new pain  Note: Patients pain level decreased with prescribed pain medications. 6/9/2022 0407 by Andrey Green RN  Outcome: Progressing     Problem: Chronic Conditions and Co-morbidities  Goal: Patient's chronic conditions and co-morbidity symptoms are monitored and maintained or improved  Outcome: Progressing     Problem: ABCDS Injury Assessment  Goal: Absence of physical injury  Outcome: Progressing  Flowsheets (Taken 6/9/2022 1714)  Absence of Physical Injury: Implement safety measures based on patient assessment  Note: No injury this shift. Patients safety maintained.

## 2022-06-10 ENCOUNTER — OUTSIDE SERVICES (OUTPATIENT)
Dept: PRIMARY CARE CLINIC | Age: 62
End: 2022-06-10

## 2022-06-10 VITALS
TEMPERATURE: 97.7 F | SYSTOLIC BLOOD PRESSURE: 114 MMHG | DIASTOLIC BLOOD PRESSURE: 86 MMHG | RESPIRATION RATE: 20 BRPM | HEART RATE: 85 BPM

## 2022-06-10 VITALS
OXYGEN SATURATION: 96 % | DIASTOLIC BLOOD PRESSURE: 78 MMHG | RESPIRATION RATE: 19 BRPM | BODY MASS INDEX: 39.6 KG/M2 | HEART RATE: 95 BPM | SYSTOLIC BLOOD PRESSURE: 126 MMHG | TEMPERATURE: 98.2 F | WEIGHT: 216.49 LBS

## 2022-06-10 DIAGNOSIS — R60.0 LOCALIZED EDEMA: ICD-10-CM

## 2022-06-10 DIAGNOSIS — E11.65 UNCONTROLLED TYPE 2 DIABETES MELLITUS WITH HYPERGLYCEMIA (HCC): ICD-10-CM

## 2022-06-10 DIAGNOSIS — M54.16 LUMBAR RADICULOPATHY: Primary | ICD-10-CM

## 2022-06-10 LAB
ANION GAP SERPL CALCULATED.3IONS-SCNC: 9 MMOL/L (ref 9–17)
BUN BLDV-MCNC: 22 MG/DL (ref 8–23)
CALCIUM SERPL-MCNC: 9.2 MG/DL (ref 8.6–10.4)
CHLORIDE BLD-SCNC: 106 MMOL/L (ref 98–107)
CO2: 25 MMOL/L (ref 20–31)
CREAT SERPL-MCNC: 0.94 MG/DL (ref 0.5–0.9)
CREATININE URINE /24 HR: ABNORMAL MG/D (ref 500–1400)
CREATININE URINE /VOLUME: 48 MG/DL
GFR AFRICAN AMERICAN: >60 ML/MIN
GFR NON-AFRICAN AMERICAN: >60 ML/MIN
GFR SERPL CREATININE-BSD FRML MDRD: ABNORMAL ML/MIN/{1.73_M2}
GLUCOSE BLD-MCNC: 174 MG/DL (ref 65–105)
GLUCOSE BLD-MCNC: 197 MG/DL (ref 70–99)
GLUCOSE BLD-MCNC: 211 MG/DL (ref 65–105)
HCT VFR BLD CALC: 30 % (ref 36–46)
HEMOGLOBIN: 9.9 G/DL (ref 12–16)
HOURS COLLECTED: 0
MCH RBC QN AUTO: 29.5 PG (ref 26–34)
MCHC RBC AUTO-ENTMCNC: 33.1 G/DL (ref 31–37)
MCV RBC AUTO: 89 FL (ref 80–100)
METANEPHRINE UF INTERPRETATION: ABNORMAL
METANEPHRINE UG/G CRE: 125 UG/G CRT (ref 0–300)
METANEPHRINES 24 HOUR URINE: ABNORMAL UG/D (ref 36–229)
METANEPHRINES, URINE (UMOL/L): 60 UG/L
NORMETANEPHRINE, (G/CRT): 665 UG/G CRT (ref 0–400)
NORMETANEPHRINE, (NMOL/DAY): ABNORMAL UG/D (ref 95–650)
NORMETANEPHRINES, NMOL/L: 319 UG/L
PDW BLD-RTO: 13.6 % (ref 11.5–14.9)
PLATELET # BLD: 326 K/UL (ref 150–450)
PMV BLD AUTO: 7.6 FL (ref 6–12)
POTASSIUM SERPL-SCNC: 4.4 MMOL/L (ref 3.7–5.3)
RBC # BLD: 3.37 M/UL (ref 4–5.2)
SODIUM BLD-SCNC: 140 MMOL/L (ref 135–144)
URINE VOLUME: 10
WBC # BLD: 7.8 K/UL (ref 3.5–11)

## 2022-06-10 PROCEDURE — 6370000000 HC RX 637 (ALT 250 FOR IP)

## 2022-06-10 PROCEDURE — 99239 HOSP IP/OBS DSCHRG MGMT >30: CPT | Performed by: INTERNAL MEDICINE

## 2022-06-10 PROCEDURE — 6370000000 HC RX 637 (ALT 250 FOR IP): Performed by: INTERNAL MEDICINE

## 2022-06-10 PROCEDURE — 6370000000 HC RX 637 (ALT 250 FOR IP): Performed by: STUDENT IN AN ORGANIZED HEALTH CARE EDUCATION/TRAINING PROGRAM

## 2022-06-10 PROCEDURE — 6360000002 HC RX W HCPCS: Performed by: INTERNAL MEDICINE

## 2022-06-10 PROCEDURE — 80048 BASIC METABOLIC PNL TOTAL CA: CPT

## 2022-06-10 PROCEDURE — 36415 COLL VENOUS BLD VENIPUNCTURE: CPT

## 2022-06-10 PROCEDURE — 82947 ASSAY GLUCOSE BLOOD QUANT: CPT

## 2022-06-10 PROCEDURE — 85027 COMPLETE CBC AUTOMATED: CPT

## 2022-06-10 RX ADMIN — PANTOPRAZOLE SODIUM 40 MG: 40 TABLET, DELAYED RELEASE ORAL at 05:46

## 2022-06-10 RX ADMIN — OXYCODONE HYDROCHLORIDE AND ACETAMINOPHEN 1 TABLET: 5; 325 TABLET ORAL at 13:04

## 2022-06-10 RX ADMIN — OXYCODONE HYDROCHLORIDE AND ACETAMINOPHEN 1 TABLET: 5; 325 TABLET ORAL at 05:34

## 2022-06-10 RX ADMIN — HEPARIN SODIUM 5000 UNITS: 5000 INJECTION INTRAVENOUS; SUBCUTANEOUS at 13:08

## 2022-06-10 RX ADMIN — DOCUSATE SODIUM 100 MG: 100 CAPSULE, LIQUID FILLED ORAL at 08:12

## 2022-06-10 RX ADMIN — INSULIN LISPRO 1 UNITS: 100 INJECTION, SOLUTION INTRAVENOUS; SUBCUTANEOUS at 08:12

## 2022-06-10 RX ADMIN — GABAPENTIN 300 MG: 300 CAPSULE ORAL at 13:08

## 2022-06-10 RX ADMIN — INSULIN LISPRO 2 UNITS: 100 INJECTION, SOLUTION INTRAVENOUS; SUBCUTANEOUS at 12:07

## 2022-06-10 RX ADMIN — INSULIN GLARGINE 12 UNITS: 100 INJECTION, SOLUTION SUBCUTANEOUS at 08:12

## 2022-06-10 RX ADMIN — ASPIRIN 81 MG: 81 TABLET, COATED ORAL at 08:12

## 2022-06-10 RX ADMIN — BUSPIRONE HYDROCHLORIDE 10 MG: 10 TABLET ORAL at 08:12

## 2022-06-10 RX ADMIN — ACETAMINOPHEN 500 MG: 500 TABLET ORAL at 08:37

## 2022-06-10 RX ADMIN — GABAPENTIN 300 MG: 300 CAPSULE ORAL at 08:12

## 2022-06-10 ASSESSMENT — ENCOUNTER SYMPTOMS
CONSTIPATION: 0
NAUSEA: 0
SHORTNESS OF BREATH: 0
BACK PAIN: 1
VOMITING: 0
ABDOMINAL PAIN: 0
ABDOMINAL DISTENTION: 0
DIARRHEA: 0

## 2022-06-10 ASSESSMENT — PAIN SCALES - GENERAL
PAINLEVEL_OUTOF10: 6
PAINLEVEL_OUTOF10: 8
PAINLEVEL_OUTOF10: 8

## 2022-06-10 ASSESSMENT — PAIN DESCRIPTION - DESCRIPTORS: DESCRIPTORS: ACHING;CRAMPING

## 2022-06-10 ASSESSMENT — PAIN DESCRIPTION - LOCATION
LOCATION: BACK
LOCATION: HIP

## 2022-06-10 ASSESSMENT — PAIN - FUNCTIONAL ASSESSMENT: PAIN_FUNCTIONAL_ASSESSMENT: ACTIVITIES ARE NOT PREVENTED

## 2022-06-10 ASSESSMENT — PAIN DESCRIPTION - ORIENTATION: ORIENTATION: LEFT

## 2022-06-10 NOTE — PROGRESS NOTES
Physical Therapy        Physical Therapy Cancel Note      DATE: 6/10/2022    NAME: Laina Maceutant  MRN: [id number]   : 1960      Patient not seen this date for Physical Therapy due to:    Patient Declined: Attempted to see pt @ [phone] 69. Upon arrival, pt states \"I'm not doing good today\". Pt reports having done too much the previous two days and feeling effects of increased pain this date. Pt defers therapy at this time. Will continue to follow for PT needs.       Electronically signed by Daniel Lopes PTA on 6/10/2022 at 10:34 AM

## 2022-06-10 NOTE — DISCHARGE INSTR - DIET

## 2022-06-10 NOTE — CARE COORDINATION
spoke with Zulema from [facility]. Pre-cert is still pending for patient.      Electronically signed by King Cortney on 6/10/2022 at 9:45 AM

## 2022-06-10 NOTE — CARE COORDINATION
arranged transporatation for patient to go to [facility]. [facility] pickup @2:00pm.    Number for Report:   [phone]    Electronically signed by Villa Medina on 6/10/2022 at 12:44 PM     informed spouse Stella Conley of patients discharge plans.      Electronically signed by Villa Medina on 6/10/2022 at 12:47 PM

## 2022-06-10 NOTE — PLAN OF CARE
Problem: Discharge Planning  Goal: Discharge to home or other facility with appropriate resources  Outcome: Completed  Flowsheets (Taken 6/10/2022 0815 by Yaniv Johnson, RN)  Discharge to home or other facility with appropriate resources: Identify barriers to discharge with patient and caregiver     Problem: Safety - Adult  Goal: Free from fall injury  Outcome: Completed  Flowsheets (Taken 6/10/2022 0816 by Yaniv Johnson RN)  Free From Fall Injury: Instruct family/caregiver on patient safety     Problem: Pain  Goal: Verbalizes/displays adequate comfort level or baseline comfort level  Outcome: Completed     Problem: Chronic Conditions and Co-morbidities  Goal: Patient's chronic conditions and co-morbidity symptoms are monitored and maintained or improved  6/10/2022 1411 by Gustavo Boogie RN  Outcome: Completed  Flowsheets (Taken 6/10/2022 0815 by Yaniv Johnson RN)  Care Plan - Patient's Chronic Conditions and Co-Morbidity Symptoms are Monitored and Maintained or Improved: Monitor and assess patient's chronic conditions and comorbid symptoms for stability, deterioration, or improvement  6/10/2022 0229 by Lady Melendez RN  Outcome: Progressing     Problem: ABCDS Injury Assessment  Goal: Absence of physical injury  Outcome: Completed  Flowsheets (Taken 6/10/2022 0816 by Yaniv Johnson RN)  Absence of Physical Injury: Implement safety measures based on patient assessment

## 2022-06-10 NOTE — PLAN OF CARE
Problem: Chronic Conditions and Co-morbidities  Goal: Patient's chronic conditions and co-morbidity symptoms are monitored and maintained or improved  6/10/2022 0229 by Felix Durant RN  Outcome: Progressing  6/9/2022 1714 by Charmaine Wells RN  Outcome: Progressing

## 2022-06-10 NOTE — PROGRESS NOTES
Geovani Lee MD/Rosendo Blake MD/ Nitish Noel MD/Dr Evon VILLALBA AGACNP-BC, NP-C      Gabby VILLALBA NP-C     Ceferino VILLALBA NP-C                                           Pulmonary Progress Note    Patient - Farhad Bishop   Age - 65 y.o.   - 1960  MRN - [id number]  Acct # - [de-identified]  Date of Admission - 2022  5:16 AM    Consulting Service/Physician:       Primary Care Physician: Brannon Shelby MD    SUBJECTIVE:     Chief Complaint:   Chief Complaint   Patient presents with    Back Pain     Subjective:    Enrique Tavares tells me she is doing okay today. She is awaiting placement at rehab. She remains on room air. She denies any current shortness of breath or cough. VITALS  BP (!) 140/83   Pulse 79   Temp 98.1 °F (36.7 °C)   Resp 19   Wt 216 lb 7.9 oz (98.2 kg)   SpO2 94%   BMI 39.60 kg/m²   Wt Readings from Last 3 Encounters:   22 216 lb 7.9 oz (98.2 kg)   22 202 lb (91.6 kg)   22 204 lb 6.4 oz (92.7 kg)     I/O (24 Hours)    Intake/Output Summary (Last 24 hours) at 6/10/2022 1051  Last data filed at 2022 2300  Gross per 24 hour   Intake --   Output 1100 ml   Net -1100 ml     Ventilator:      Exam:   Physical Exam   Constitutional: Lying in bed on room air no distress  HENT: Unremarkable  Head: Normocephalic and atraumatic. Eyes: EOM are normal. Pupils are equal, round, and reactive to light. Neck: Neck supple. Cardiovascular:  Regular rate and rhythm. Normal heart tones. No JVD. Pulmonary/Chest: Respirations even and unlabored, lungs clear anteriorly, on room air with pulse ox 94%  Abdominal: Soft. Bowel sounds are normal.   Musculoskeletal: Normal range of motion. Generalized weakness  Neurological: Patient is alert and oriented to person, place, and time. Skin: Skin is warm and dry. No rash noted.    Extremities: No edema or discoloration  Infusions:      dextrose       Meds:     Current Facility-Administered Medications:     rOPINIRole (REQUIP) tablet 1 mg, 1 mg, Oral, Nightly, Hesham Del Valle MD, 1 mg at 06/09/22 2036    cyclobenzaprine (FLEXERIL) tablet 5 mg, 5 mg, Oral, Nightly, Brendan Lloyd MD, 5 mg at 06/09/22 2036    insulin glargine (LANTUS) injection vial 12 Units, 12 Units, SubCUTAneous, Daily, Lizz Steen MD, 12 Units at 06/10/22 0812    oxyCODONE-acetaminophen (PERCOCET) 5-325 MG per tablet 1 tablet, 1 tablet, Oral, Q6H PRN, Lizz Steen MD, 1 tablet at 06/10/22 0534    gabapentin (NEURONTIN) capsule 300 mg, 300 mg, Oral, TID, Rhonda Jorgensen MD, 300 mg at 06/10/22 1737    midodrine (PROAMATINE) tablet 10 mg, 10 mg, Oral, TID PRN, Lizz Steen MD    hydrALAZINE (APRESOLINE) injection 10 mg, 10 mg, IntraVENous, Q6H PRN, Lizz Steen MD    perflutren lipid microspheres (DEFINITY) injection 1.65 mg, 1.5 mL, IntraVENous, ONCE PRN, Xiang Isidro MD    heparin (porcine) injection 5,000 Units, 5,000 Units, SubCUTAneous, 3 times per day, Mango Díaz MD, 5,000 Units at 06/09/22 1402    docusate sodium (COLACE) capsule 100 mg, 100 mg, Oral, BID, Mango Díaz MD, 100 mg at 06/10/22 1416    [Held by provider] oxyCODONE (OXYCONTIN) extended release tablet 10 mg, 10 mg, Oral, 2 times per day, Mango Díaz MD, 10 mg at 06/04/22 2101    glucose chewable tablet 16 g, 4 tablet, Oral, PRN, Mango Díaz MD    dextrose bolus 10% 125 mL, 125 mL, IntraVENous, PRN **OR** dextrose bolus 10% 250 mL, 250 mL, IntraVENous, PRN, Mango Díaz MD    glucagon (rDNA) injection 1 mg, 1 mg, IntraMUSCular, PRN, Mango Díaz MD    dextrose 5 % solution, 100 mL/hr, IntraVENous, PRN, Mango Díaz MD    acetaminophen (TYLENOL) tablet 500 mg, 500 mg, Oral, Q6H PRN, Mango Díaz MD, 500 mg at 06/10/22 0837    albuterol sulfate  (90 Base) MCG/ACT inhaler 2 puff, 2 puff, Inhalation, Q4H PRN, Mango Díaz MD    aspirin EC tablet 81 mg, 81 mg, Oral, Daily, Sherin Almaraz MD, 81 mg at 06/10/22 0812    latanoprost (XALATAN) 0.005 % ophthalmic solution 1 drop, 1 drop, Both Eyes, Nightly, Venu Lorenzo MD, 1 drop at 06/08/22 2320    busPIRone (BUSPAR) tablet 10 mg, 10 mg, Oral, BID, Sherin Almaraz MD, 10 mg at 06/10/22 0812    [Held by provider] chlorthalidone (HYGROTON) tablet 25 mg, 25 mg, Oral, Daily, Sherin Almaraz MD, 25 mg at 06/04/22 0857    pantoprazole (PROTONIX) tablet 40 mg, 40 mg, Oral, QAM AC, Sherin Almaraz MD, 40 mg at 06/10/22 0546    [Held by provider] lisinopril (PRINIVIL;ZESTRIL) tablet 40 mg, 40 mg, Oral, Daily, Sherin Almaraz MD, 40 mg at 06/04/22 0845    [Held by provider] rosuvastatin (CRESTOR) tablet 20 mg, 20 mg, Oral, Nightly, Sherin Almaraz MD, 20 mg at 06/04/22 2105    insulin lispro (HUMALOG) injection vial 0-6 Units, 0-6 Units, SubCUTAneous, TID WC, Sherin Almaraz MD, 1 Units at 06/10/22 5173    insulin lispro (HUMALOG) injection vial 0-3 Units, 0-3 Units, SubCUTAneous, Nightly, Sherin Almaraz MD, 1 Units at 06/09/22 2047    Lab Results:     Lab Results   Component Value Date    WBC 7.8 06/10/2022    HGB 9.9 (L) 06/10/2022    HCT 30.0 (L) 06/10/2022    MCV 89.0 06/10/2022     06/10/2022     Lab Results   Component Value Date    CALCIUM 9.2 06/10/2022     06/10/2022    K 4.4 06/10/2022    CO2 25 06/10/2022     06/10/2022    BUN 22 06/10/2022    CREATININE 0.94 (H) 06/10/2022       Lab Results   Component Value Date    INR 1.0 07/23/2013    PROTIME 10.0 07/23/2013       Radiology:     ASSESSMENT:       Altered Mental Status improved  Morbid obesity  Possible-high suspicion for sleep apnea- no sleep study history. HTN- within normal limits  DM2- hyperglycemia, improved  Anemia- hemoglobin 9.9  HAYLEY- resolved  Back and Leg Pain- persists despite pain management  Full Code    PLAN:   1. Okay for transfer to rehab from pulmonary standpoint  2.  Recommend outpatient sleep study, order was placed in the system per Dr. Sathya Barker  3. Recommend follow-up in our office in 4 to 6 weeks, [phone]  4. Pulmonary will sign off at this time, please reconsult for any issues or concerns. Electronically signed by ORLY Hylton CNP on 06/10/22     This progress note was completed using a voice transcription system. Every effort was made to ensure accuracy. However, inadvertent computerized transcription errors may be present.     Rhys Wiley, NP-C, MSN  [facility] Pulmonary, Critical Care & Sleep

## 2022-06-10 NOTE — DISCHARGE SUMMARY
Critical access hospital Internal Medicine    Discharge Summary     Patient ID: Alexandre Shabazz  :  1960   MRN: [id number]     ACCOUNT:  [de-identified]   Patient's PCP: Harley Hendrix MD  Admit Date: 2022   Discharge Date: 6/10/2022    Length of Stay: 4  Code Status:  Full Code  Admitting Physician: Zoë Donaldson MD  Discharge Physician: Zoë Donaldson MD     Active Discharge Diagnoses:     Primary Problem  Back pain      [place] Problems    Diagnosis Date Noted    Class 2 obesity in adult [E66.9] 2022     Priority: Medium    Toxic metabolic encephalopathy [S57.3] 2022     Priority: Medium    Back pain [M54.9] 2022     Priority: Medium    Diabetes mellitus type 2, uncontrolled ([address] [E11.65] 10/12/2014    Dyslipidemia [E78.5] 10/12/2014    DIEGO (nonalcoholic steatohepatitis) [K75.81] 10/12/2014    Hypertension [I10]        Admission Condition:  fair     Discharged Condition: fair    Hospital Stay:     Hospital Course:  Alexandre Shabazz is a 65 y.o. female who was admitted for the management of Back pain , presented to ER with Back Pain    Patient has past medical history multiple medical problems, morbid obesity, diabetes, history of TIA, CKD, chronic back pain. Patient follows with pain management, orthopedic surgeon. She has history of epidural shots in her back  Admitted with complaints of back pain which started today morning, yesterday she was bending frequently, she was collecting some old close to donate, and likely that has precipitated her back pain. Back pain is radiating to left buttock and left leg, no difficulty in passing urine, stools  No injury, trauma, fall  In emergency room, patient was given multiple pain shots, with minimal benefit, patient is actively crying in the pain, only comfortable position is leaning on the bed        22     · Awaiting placement  · We will add Flexeril at bedtime. Did not sleep well . Has restless leg syndrome   Resume requip         6/10/22     · To [facility] . 1.    Pulm input; Altered Mental Status and confusion  Morbid obesity  Possible-high suspicion for sleep apnea- no sleep study history. Monitor O2 levels and assess need for BiPAP or CPAP as SPO2 is 93%. Tachycardia  HTN- within normal limits. Continue medication management. DM2- glucose remains elevated at 245. Consider insulin. Acute kidney injury  Back and Leg Pain- persists despite Gabapentin. Opiates were discontinued due to mental status  SNF placement pending  2.            1.  HAYLEY nonoliguric most likely secondary to prerenal azotemia versus ATN due to hypotension use of chlorthalidone and lisinopril, urine sodium 27, urine analysis UA unremarkable except for trace hemoglobin trace protein     2.   Urinary retention status post Bourgeois catheter placement   Stopping anticholinergics helped           Significant therapeutic interventions:     Significant Diagnostic Studies:   Labs / Micro:        ,     Radiology:    ECHO Complete 2D W Doppler W Color    Result Date: 6/6/2022  [facility]04 [facility] Transthoracic Echocardiography Report (TTE)  Patient Name Eulalio Barnes Date of Study                 06/06/2022               LEE ANN CARRASQUILLO   Date of      1960  Gender                        Female  Birth   Age          65 year(s)  Race                             Room Number  2011   Corporate ID [id number]    Weight:                       216 pounds, 98 kg  #   Patient Hulon Seats [de-identified]  #   MR #         [id number]      Sonographer                   Marisol Jorge   Accession #  [id number]  Interpreting Physician        Tawny Zelaya   Fellow                   Referring Nurse Practitioner   Interpreting             Referring Physician           Venu Lorenzo  Fellow                                                 Anastacia Huitron  Type of Study   TTE procedure:2D Echocardiogram, M-Mode, Doppler, Color Doppler. Procedure Date Date: 06/06/2022 Start: 02:10 PM Study Location: [address] [place] [address] Technical Quality: Fair visualization Indications:Congestive heart failure. Patient Status: Inpatient Weight: 216 pounds Rhythm: Sinus tachycardia HR: 111 bpm BP: 111/61 mmHg CONCLUSIONS Summary Technically difficult study. Normal LV size. Hyperdynamic left ventricular function. Estimated LV EF 65-70 %. Normal wall thickness. No segmental wall motion abnormalities. Grade I (mild) left ventricular diastolic dysfunction. Normal right ventricular size and function. Left atrium is normal in size. Right atrium was not well visualized. Aortic valve was not well visualized. No aortic stenosis. No aortic insufficiency. Normal aortic root dimension. Normal mitral valve structure and function. No significant mitral regurgitation. Tricuspid valve was not well visualized. Insignificant tricuspid regurgitation, unable to estimate RVSP. IVC not well visualized. No significant pericardial effusion is seen. Signature ----------------------------------------------------------------------------  Electronically signed by Tawny Zelaya(Interpreting physician) on  06/06/2022 05:07 PM ---------------------------------------------------------------------------- ----------------------------------------------------------------------------  Electronically signed by Marisol Jorge(Sonographer) on 06/06/2022 02:37  PM ---------------------------------------------------------------------------- FINDINGS Left Atrium Left atrium is normal in size. Left Ventricle Small LV cavity. Hyperdynamic left ventricular function. Estimated LV EF 65-70 %. Normal wall thickness. No segmental wall motion abnormalities. Grade I (mild) left ventricular diastolic dysfunction. Right Atrium Right atrium was not well visualized. Right Ventricle Normal right ventricular size and function. Mitral Valve Normal mitral valve structure and function.  No significant mitral regurgitation. Aortic Valve Aortic valve was not well visualized. No aortic insufficiency. No aortic stenosis. Tricuspid Valve Tricuspid valve was not well visualized. Insignificant tricuspid regurgitation, unable to estimate RVSP. Pulmonic Valve Pulmonic valve not well visualized but Doppler velocities are normal. No pulmonic insufficiency. Pericardial Effusion No significant pericardial effusion is seen. Miscellaneous Normal aortic root dimension. E/e' average 7.4 IVC not well visualized. M-mode / 2D Measurements & Calculations:   LVIDd:3.19 cm(3.7 - 5.6 cm)      Diastolic UIVUPT:44.8 ml  KVKJU:0.72 cm(2.2 - 4.0 cm)      Systolic CUASAH:2.65 ml  FYEI:1.38 cm(0.6 - 1.1 cm)       Aortic Root:2.4 cm(2.0 - 3.7 cm)  LVPWd:1.07 cm(0.6 - 1.1 cm)      LA Dimension: 2.9 cm(1.9 - 4.0 cm)  Fractional Shortenin.81 %    LA volume/Index: 17.7 ml  Calculated LVEF (%): 78.22 %     LVOT:1.9 cm   Mitral:                              Aortic   Peak E-Wave: 0.61 m/s                Peak Velocity: 1.69 m/s  Peak A-Wave: 0.86 m/s                Mean Velocity: 1.22 m/s  E/A Ratio: 0.71                      Peak Gradient: 11.42 mmHg  Peak Gradient: 1.47 mmHg             Mean Gradient: 7 mmHg  Deceleration Time: 162 msec                                        Area (continuity): 2.44 cm^2                                       AV VTI: 27.1 cm  Septal Wall E' velocity:0.08 m/s Lateral Wall E' velocity:0.08 m/s    XR LUMBAR SPINE (2-3 VIEWS)    Result Date: 2022  EXAMINATION: THREE XRAY VIEWS OF THE LUMBAR SPINE 2022 4:27 pm COMPARISON: None. HISTORY: ORDERING SYSTEM PROVIDED HISTORY: Lower back Pain TECHNOLOGIST PROVIDED HISTORY: Lower back Pain Reason for Exam: PT CO acute lower back pain X several days with NKI per pt. FINDINGS: There is a mild dextroscoliosis of the lower lumbar spine.  There are 5 lumbar segments There is mild anterior spurring of the visualized lumbar vertebral bodies There is moderate L5-S1 joint space compromise. The remaining disc spaces and vertical heights of the vertebral bodies are maintained There is no pedicular/pars interarticularis defect, fracture or dislocation seen     Scoliosis and L5-S1 degenerative changes     MRI LUMBAR SPINE WO CONTRAST    Result Date: 6/2/2022  EXAMINATION: MRI OF THE LUMBAR SPINE WITHOUT CONTRAST, 6/2/2022 12:46 pm TECHNIQUE: Multiplanar multisequence MRI of the lumbar spine was performed without the administration of intravenous contrast. COMPARISON: 03/22/2022 HISTORY: ORDERING SYSTEM PROVIDED HISTORY: lumbar left radicular pain FINDINGS: BONES/ALIGNMENT: There is loss of the normal lordotic curvature. The vertebral body heights are maintained. The bone marrow signal appears unremarkable. Mild degenerative disc height loss at L5-S1. Mild multilevel facet arthropathy. SPINAL CORD: The conus terminates normally. SOFT TISSUES: No paraspinal mass identified. L1-L2: There is no significant disc herniation, spinal canal stenosis or neural foraminal narrowing. L2-L3: There is no significant disc herniation, spinal canal stenosis or neural foraminal narrowing. L3-L4: There is no significant disc herniation, spinal canal stenosis or neural foraminal narrowing. L4-L5: There is no significant disc herniation, spinal canal stenosis or neural foraminal narrowing. L5-S1: Disc bulging with asymmetric encroachment on the left lateral recess and abutment of the transversing left S1 nerve root. No significant foraminal stenosis. No significant change compared to MR lumbar spine from 03/22/2022. Disc bulging at L5-S1 with asymmetric encroachment on the left lateral recess and abutment of the transversing left S1 nerve root.      XR CHEST PORTABLE    Result Date: 6/5/2022  EXAMINATION: ONE XRAY VIEW OF THE CHEST 6/5/2022 6:35 pm COMPARISON: 02/04/2021 HISTORY: ORDERING SYSTEM PROVIDED HISTORY: fever TECHNOLOGIST PROVIDED HISTORY: fever Reason for Exam: Fever FINDINGS: The cardiomediastinal silhouette is normal in size and contour. The lungs demonstrate mild bibasilar atelectasis versus scarring. No pleural effusion or pneumothorax is present. No acute cardiopulmonary process. Minimal bibasilar atelectasis     US RETROPERITONEAL COMPLETE    Result Date: 6/7/2022  EXAMINATION: RETROPERITONEAL ULTRASOUND OF THE KIDNEYS AND URINARY BLADDER 6/7/2022 COMPARISON: None HISTORY: ORDERING SYSTEM PROVIDED HISTORY: Acute kidney injury TECHNOLOGIST PROVIDED HISTORY: Acute kidney injury FINDINGS: Kidneys: The right kidney measures 10.6 cm in length and the left kidney measures 0.7 cm in length. Kidneys demonstrate normal cortical echogenicity. No evidence of hydronephrosis or intrarenal stones. Incidental finding of increased echogenicity through the liver consistent with hepatic steatosis. Unremarkable ultrasound kidneys Liver shows increased echogenicity suggesting hepatic steatosis without focal lesion. RECOMMENDATIONS: Unavailable         Consultations:    Consults:     Final Specialist Recommendations/Findings:   IP CONSULT TO ORTHOPEDIC SURGERY  IP CONSULT TO NEPHROLOGY  IP CONSULT TO PULMONOLOGY  IP CONSULT TO SOCIAL WORK      The patient was seen and examined on day of discharge and this discharge summary is in conjunction with any daily progress note from day of discharge.     Discharge plan:     Disposition: Non [facility] facility   [place]    Physician Follow Up:     Gordo Smith MD  [address] [place] [address]  [phone]    Schedule an appointment as soon as possible for a visit in 2 weeks  post-hospital visit    Quinn Lord MD  [address], [address]  [phone]    In 4 weeks  Follow up    Gordo Smith MD  [address] [facility]  [phone]             Requiring Further Evaluation/Follow Up POST HOSPITALIZATION/Incidental Findings:    Diet: cardiac diet    Activity: As tolerated    Instructions to Patient: Discharge Medications:      Medication List      START taking these medications    cyclobenzaprine 5 MG tablet  Commonly known as: FLEXERIL  Take 1 tablet by mouth nightly for 10 days     docusate sodium 100 mg capsule  Commonly known as: COLACE  Take 1 capsule by mouth 2 times daily     oxyCODONE-acetaminophen 5-325 MG per tablet  Commonly known as: PERCOCET  Take 1 tablet by mouth every 6 hours as needed for Pain for up to 3 days. CHANGE how you take these medications    busPIRone 10 MG tablet  Commonly known as: BUSPAR  Take 1 tablet by mouth 2 times daily  What changed:   · how much to take  · how to take this  · when to take this  · additional instructions     diclofenac sodium 1 % Gel  Commonly known as: VOLTAREN  Apply 2 g topically 2 times daily  What changed: additional instructions     gabapentin 300 MG capsule  Commonly known as: NEURONTIN  Take 1 capsule by mouth 3 times daily for 30 days. What changed: Another medication with the same name was removed. Continue taking this medication, and follow the directions you see here.      rOPINIRole 1 MG tablet  Commonly known as: REQUIP  Take 1 tablet by mouth nightly  What changed:   · medication strength  · how much to take  · how to take this  · when to take this  · additional instructions        CONTINUE taking these medications    albuterol sulfate  (90 Base) MCG/ACT inhaler  Commonly known as: Proventil HFA  Inhale 2 puffs into the lungs every 4 hours as needed for Wheezing     Aspirin Low Dose 81 MG EC tablet  Generic drug: aspirin  TAKE 1 TABLET BY MOUTH ONE TIME A DAY     celecoxib 100 MG capsule  Commonly known as: CELEBREX  Take 1 capsule by mouth 2 times daily     glipiZIDE 10 MG tablet  Commonly known as: GLUCOTROL  Take 1 tab bid     Lumigan 0.01 % Soln ophthalmic drops  Generic drug: bimatoprost     omeprazole 20 MG delayed release capsule  Commonly known as: PriLOSEC  Take 1 capsule by mouth 2 times daily     Prodigy Autocode Blood Glucose w/Device Kit  1 Device by Does not apply route 3 times daily     Prodigy Lancets 28G Misc  1 Bottle by Does not apply route three times daily     Prodigy No Coding Blood Gluc strip  Generic drug: blood glucose test strips  Use to test once daily and as needed as directed by provider     rosuvastatin 20 MG tablet  Commonly known as: CRESTOR  TAKE ONE TABLET BY MOUTH NIGHTLY     Synjardy 5-1000 MG Tabs  Generic drug: Empagliflozin-metFORMIN HCl  TAKE 1 TABLET BY MOUTH 2 TIMES A DAY     Trulicity 1.5 IO/1.4KX Sopn  Generic drug: Dulaglutide  INJECT THE CONTENTS OF ONE SYRINGE UNDER THE SKIN ONCE WEEKLY        STOP taking these medications    Acetaminophen Extra Strength 500 MG tablet  Generic drug: acetaminophen     chlorthalidone 25 MG tablet  Commonly known as: HYGROTON     lisinopril 40 MG tablet  Commonly known as: PRINIVIL;ZESTRIL     tiZANidine 2 MG tablet  Commonly known as: ZANAFLEX     vitamin D 1.25 MG (11965 UT) Caps capsule  Commonly known as: ERGOCALCIFEROL           Where to Get Your Medications      These medications were sent to [id number] [address] Felix Cosme [address] [address]    Phone: [phone]   · busPIRone 10 MG tablet  · cyclobenzaprine 5 MG tablet  · docusate sodium 100 mg capsule  · rOPINIRole 1 MG tablet     You can get these medications from any pharmacy    Bring a paper prescription for each of these medications  · gabapentin 300 MG capsule  · oxyCODONE-acetaminophen 5-325 MG per tablet         Time Spent on discharge is  35 mins in patient examination, evaluation, counseling as well as medication reconciliation, prescriptions for required medications, discharge plan and follow up. Electronically signed by   Crystal Aguilera MD  6/10/2022  1:57 PM      Thank you Dr. Sofy Ontiveros MD for the opportunity to be involved in this patient's care.

## 2022-06-10 NOTE — PROGRESS NOTES
[address]    PROGRESS NOTE             6/10/2022    1:55 PM    Name:   Maynard Brunner  MRN:     [id number]     Acct:      [de-identified]   Room:   2076/2076-01   Day:  4  Admit Date:  5/31/2022  5:16 AM    PCP:  Gordo Smith MD  Code Status:  Full Code    Subjective:     C/C:   Chief Complaint   Patient presents with    Back Pain     Interval History Status: improved. 6/8/22    · Patient continues to complain of back pain. · Will add Flexeril at bedtime. · Vitals stable, blood pressure 158/82  · Labs reviewed, blood sugar in 200s this morning. · Elevated CRP and CK levels. · Unremarkable renal ultrasound however it does show hepatic steatosis  · Minimal urine output. · Awaiting placement             Review of Systems:     Review of Systems   Constitutional: Negative for chills and fever. Respiratory: Negative for shortness of breath. Cardiovascular: Negative for chest pain, palpitations and leg swelling. Gastrointestinal: Negative for abdominal distention, abdominal pain, constipation, diarrhea, nausea and vomiting. Genitourinary: Negative for dysuria. Musculoskeletal: Positive for back pain. Neurological: Negative for dizziness. Psychiatric/Behavioral: Negative for agitation. Medications: Allergies:     Allergies   Allergen Reactions    Codeine Nausea And Vomiting       Current Meds:   Scheduled Meds:    rOPINIRole  1 mg Oral Nightly    cyclobenzaprine  5 mg Oral Nightly    insulin glargine  12 Units SubCUTAneous Daily    gabapentin  300 mg Oral TID    heparin (porcine)  5,000 Units SubCUTAneous 3 times per day    docusate sodium  100 mg Oral BID    [Held by provider] oxyCODONE  10 mg Oral 2 times per day    aspirin  81 mg Oral Daily    latanoprost  1 drop Both Eyes Nightly    busPIRone  10 mg Oral BID    [Held by provider] chlorthalidone  25 mg Oral Daily    pantoprazole  40 mg Oral QAM AC    [Held by provider] lisinopril  40 mg Oral Daily    [Held by provider] rosuvastatin  20 mg Oral Nightly    insulin lispro  0-6 Units SubCUTAneous TID WC    insulin lispro  0-3 Units SubCUTAneous Nightly     Continuous Infusions:    dextrose       PRN Meds: oxyCODONE-acetaminophen, midodrine, hydrALAZINE, perflutren lipid microspheres, glucose, dextrose bolus **OR** dextrose bolus, glucagon (rDNA), dextrose, acetaminophen, albuterol sulfate HFA    Data:     Past Medical History:   has a past medical history of Cervical spondylosis, Generalized anxiety disorder, Hyperlipidemia, Hypertension, Lumbar radiculopathy, DIEGO (nonalcoholic steatohepatitis), Obesity, Osteoarthritis of left knee, Restless legs syndrome, and Type II or unspecified type diabetes mellitus without mention of complication, not stated as uncontrolled. Social History:   reports that she has never smoked. She has never used smokeless tobacco. She reports current alcohol use. She reports that she does not use drugs. Family History:   Family History   Problem Relation Age of Onset    Heart Attack Mother     Diabetes Mother     Diabetes Father     Heart Attack Father        Vitals:  /78   Pulse 95   Temp 98.2 °F (36.8 °C)   Resp 19   Wt 216 lb 7.9 oz (98.2 kg)   SpO2 96%   BMI 39.60 kg/m²   Temp (24hrs), Av.1 °F (36.7 °C), Min:97.9 °F (36.6 °C), Max:98.2 °F (36.8 °C)    Recent Labs     22  1621 22  1950 06/10/22  0606 06/10/22  1047   POCGLU 150* 237* 174* 211*       I/O(24Hr):     Intake/Output Summary (Last 24 hours) at 6/10/2022 1355  Last data filed at 2022 2300  Gross per 24 hour   Intake --   Output 1100 ml   Net -1100 ml       Labs:  [unfilled]    Lab Results   Component Value Date/Time    SPECIAL NOT REPORTED 10/07/2021 06:24 PM     Lab Results   Component Value Date/Time    CULTURE NO SIGNIFICANT GROWTH 10/07/2021 06:24 PM       [unfilled]    Radiology:    ECHO Complete 2D W Doppler W Color    Result Date: 6/6/2022  [facility] Transthoracic Echocardiography Report (TTE)  Patient Name Jasmine Guerra Date of Study                 06/06/2022               LEE ANN CARRASQUILLO   Date of      1960  Gender                        Female  Birth   Age          65 year(s)  Race                             Room Number  2011   Corporate ID [id number]    Weight:                       216 pounds, 98 kg  #   Patient Dread [de-identified]  #   MR #         [id number]      Sonographer                   Marisol Jorge   Accession #  [id number]  Interpreting Physician        Tawny Zelaya   Fellow                   Referring Nurse Practitioner   Interpreting             Referring Physician           Venu Lorenzo  Fellow                                                 Anastacia Huitron  Type of Study   TTE procedure:2D Echocardiogram, M-Mode, Doppler, Color Doppler. Procedure Date Date: 06/06/2022 Start: 02:10 PM Study Location: [facility] Technical Quality: Fair visualization Indications:Congestive heart failure. Patient Status: Inpatient Weight: 216 pounds Rhythm: Sinus tachycardia HR: 111 bpm BP: 111/61 mmHg CONCLUSIONS Summary Technically difficult study. Normal LV size. Hyperdynamic left ventricular function. Estimated LV EF 65-70 %. Normal wall thickness. No segmental wall motion abnormalities. Grade I (mild) left ventricular diastolic dysfunction. Normal right ventricular size and function. Left atrium is normal in size. Right atrium was not well visualized. Aortic valve was not well visualized. No aortic stenosis. No aortic insufficiency. Normal aortic root dimension. Normal mitral valve structure and function. No significant mitral regurgitation. Tricuspid valve was not well visualized. Insignificant tricuspid regurgitation, unable to estimate RVSP. IVC not well visualized. No significant pericardial effusion is seen.  Signature ----------------------------------------------------------------------------  Electronically signed by Tawny Zelaya(Interpreting physician) on  2022 05:07 PM ---------------------------------------------------------------------------- ----------------------------------------------------------------------------  Electronically signed by Marisol Jorge(Sonographer) on 2022 02:37  PM ---------------------------------------------------------------------------- FINDINGS Left Atrium Left atrium is normal in size. Left Ventricle Small LV cavity. Hyperdynamic left ventricular function. Estimated LV EF 65-70 %. Normal wall thickness. No segmental wall motion abnormalities. Grade I (mild) left ventricular diastolic dysfunction. Right Atrium Right atrium was not well visualized. Right Ventricle Normal right ventricular size and function. Mitral Valve Normal mitral valve structure and function. No significant mitral regurgitation. Aortic Valve Aortic valve was not well visualized. No aortic insufficiency. No aortic stenosis. Tricuspid Valve Tricuspid valve was not well visualized. Insignificant tricuspid regurgitation, unable to estimate RVSP. Pulmonic Valve Pulmonic valve not well visualized but Doppler velocities are normal. No pulmonic insufficiency. Pericardial Effusion No significant pericardial effusion is seen. Miscellaneous Normal aortic root dimension. E/e' average 7.4 IVC not well visualized.  M-mode / 2D Measurements & Calculations:   LVIDd:3.19 cm(3.7 - 5.6 cm)      Diastolic GSTWJK:89.3 ml  IGDVL:6.20 cm(2.2 - 4.0 cm)      Systolic ETGBR.06 ml  NWEM:3.94 cm(0.6 - 1.1 cm)       Aortic Root:2.4 cm(2.0 - 3.7 cm)  LVPWd:1.07 cm(0.6 - 1.1 cm)      LA Dimension: 2.9 cm(1.9 - 4.0 cm)  Fractional Shortenin.81 %    LA volume/Index: 17.7 ml  Calculated LVEF (%): 78.22 %     LVOT:1.9 cm   Mitral:                              Aortic   Peak E-Wave: 0.61 m/s                Peak Velocity: 1.69 m/s  Peak A-Wave: 0.86 m/s                Mean Velocity: 1.22 m/s  E/A Ratio: 0.71                      Peak Gradient: 11.42 mmHg  Peak Gradient: 1.47 mmHg             Mean Gradient: 7 mmHg  Deceleration Time: 162 msec                                        Area (continuity): 2.44 cm^2                                       AV VTI: 27.1 cm  Septal Wall E' velocity:0.08 m/s Lateral Wall E' velocity:0.08 m/s    XR LUMBAR SPINE (2-3 VIEWS)    Result Date: 5/31/2022  EXAMINATION: THREE XRAY VIEWS OF THE LUMBAR SPINE 5/31/2022 4:27 pm COMPARISON: None. HISTORY: ORDERING SYSTEM PROVIDED HISTORY: Lower back Pain TECHNOLOGIST PROVIDED HISTORY: Lower back Pain Reason for Exam: PT CO acute lower back pain X several days with NKI per pt. FINDINGS: There is a mild dextroscoliosis of the lower lumbar spine. There are 5 lumbar segments There is mild anterior spurring of the visualized lumbar vertebral bodies There is moderate L5-S1 joint space compromise. The remaining disc spaces and vertical heights of the vertebral bodies are maintained There is no pedicular/pars interarticularis defect, fracture or dislocation seen     Scoliosis and L5-S1 degenerative changes     MRI LUMBAR SPINE WO CONTRAST    Result Date: 6/2/2022  EXAMINATION: MRI OF THE LUMBAR SPINE WITHOUT CONTRAST, 6/2/2022 12:46 pm TECHNIQUE: Multiplanar multisequence MRI of the lumbar spine was performed without the administration of intravenous contrast. COMPARISON: 03/22/2022 HISTORY: ORDERING SYSTEM PROVIDED HISTORY: lumbar left radicular pain FINDINGS: BONES/ALIGNMENT: There is loss of the normal lordotic curvature. The vertebral body heights are maintained. The bone marrow signal appears unremarkable. Mild degenerative disc height loss at L5-S1. Mild multilevel facet arthropathy. SPINAL CORD: The conus terminates normally. SOFT TISSUES: No paraspinal mass identified. L1-L2: There is no significant disc herniation, spinal canal stenosis or neural foraminal narrowing. L2-L3: There is no significant disc herniation, spinal canal stenosis or neural foraminal narrowing. L3-L4: There is no significant disc herniation, spinal canal stenosis or neural foraminal narrowing. L4-L5: There is no significant disc herniation, spinal canal stenosis or neural foraminal narrowing. L5-S1: Disc bulging with asymmetric encroachment on the left lateral recess and abutment of the transversing left S1 nerve root. No significant foraminal stenosis. No significant change compared to MR lumbar spine from 03/22/2022. Disc bulging at L5-S1 with asymmetric encroachment on the left lateral recess and abutment of the transversing left S1 nerve root. XR CHEST PORTABLE    Result Date: 6/5/2022  EXAMINATION: ONE XRAY VIEW OF THE CHEST 6/5/2022 6:35 pm COMPARISON: 02/04/2021 HISTORY: ORDERING SYSTEM PROVIDED HISTORY: fever TECHNOLOGIST PROVIDED HISTORY: fever Reason for Exam: Fever FINDINGS: The cardiomediastinal silhouette is normal in size and contour. The lungs demonstrate mild bibasilar atelectasis versus scarring. No pleural effusion or pneumothorax is present. No acute cardiopulmonary process. Minimal bibasilar atelectasis         Physical Examination:        Physical Exam  Constitutional:       Appearance: She is obese. Cardiovascular:      Rate and Rhythm: Normal rate and regular rhythm. Pulses: Normal pulses. Heart sounds: Normal heart sounds. No murmur heard. No gallop. Pulmonary:      Effort: Pulmonary effort is normal.      Breath sounds: Normal breath sounds. No wheezing, rhonchi or rales. Abdominal:      General: Bowel sounds are normal. There is no distension. Palpations: Abdomen is soft. Tenderness: There is no abdominal tenderness. Musculoskeletal:      Right lower leg: No edema. Left lower leg: No edema. Skin:     General: Skin is warm. Neurological:      Mental Status: She is alert.        Assessment:        Primary Problem  Back pain    Active Hospital Problems    Diagnosis Date Noted    Class 2 obesity in adult [E66.9] 06/07/2022     Priority: Medium    Toxic metabolic encephalopathy [B51.8] 06/06/2022     Priority: Medium    Back pain [M54.9] 05/31/2022     Priority: Medium    Diabetes mellitus type 2, uncontrolled ([facility] [address] [E11.65] 10/12/2014    Dyslipidemia [E78.5] 10/12/2014    DIEGO (nonalcoholic steatohepatitis) [K75.81] 10/12/2014    Hypertension [I10]        Plan:          6/8/22    · Awaiting placement  · We will add Flexeril at bedtime. Did not sleep well . Has restless leg syndrome   Resume requip        6/10/22    · To [facility] . 1.              Ludin Santos MD

## 2022-06-11 ASSESSMENT — ENCOUNTER SYMPTOMS
NAUSEA: 0
COUGH: 0
DIARRHEA: 0
SHORTNESS OF BREATH: 0
BACK PAIN: 1
CONSTIPATION: 1

## 2022-06-11 NOTE — PROGRESS NOTES
Kaylene Baker is a 65 y.o. female being seen for her weekly follow up. Location of visit: [facility]    Visit Date:  6/10/2022    Reason for Visit:    Chief Complaint   Patient presents with    Back Pain        HPI   New admit is having intense pain in lower back with sciatica. She does not routinely check blood sugar at home. She is having more edema then usual. Her diuretic was stopped in hospital.    Review of Systems   Constitutional: Positive for fatigue. Negative for diaphoresis and fever. HENT: Negative for congestion. Respiratory: Negative for cough and shortness of breath. Cardiovascular: Positive for leg swelling. Negative for chest pain and palpitations. Gastrointestinal: Positive for constipation. Negative for diarrhea and nausea. Genitourinary: Negative for difficulty urinating and dysuria. Musculoskeletal: Positive for arthralgias, back pain and gait problem. Skin: Negative for rash and wound. Neurological: Negative for dizziness, light-headedness and headaches. Psychiatric/Behavioral: Negative for dysphoric mood and sleep disturbance. The patient is nervous/anxious. Physical Exam  Vitals and nursing note reviewed. Constitutional:       Appearance: She is obese. HENT:      Head: Normocephalic and atraumatic. Right Ear: External ear normal.      Left Ear: External ear normal.   Cardiovascular:      Rate and Rhythm: Normal rate and regular rhythm. Pulmonary:      Effort: Pulmonary effort is normal.      Breath sounds: Decreased breath sounds present. Abdominal:      General: Bowel sounds are normal.      Palpations: Abdomen is soft. Musculoskeletal:      Right lower le+ Pitting Edema present. Left lower le+ Pitting Edema present. Neurological:      Mental Status: She is alert. Psychiatric:         Mood and Affect: Mood is anxious. Behavior: Behavior normal.         Thought Content:  Thought content normal. Cognition and Memory: Cognition and memory normal.          Allergies   Allergen Reactions    Codeine Nausea And Vomiting        Past Medical History:   Diagnosis Date    Cervical spondylosis 4/2009    c-4 c-5, c-5 c-6, c-6 c-7    Generalized anxiety disorder 12/8/2020    Hyperlipidemia     Hypertension     Lumbar radiculopathy     DIEGO (nonalcoholic steatohepatitis) 10/12/2014    Obesity     Osteoarthritis of left knee 8/19/2014    Restless legs syndrome     Type II or unspecified type diabetes mellitus without mention of complication, not stated as uncontrolled         ASSESSMENT:  /86   Pulse 85   Temp 97.7 °F (36.5 °C)   Resp 20       Diagnosis Orders   1. Lumbar radiculopathy     2. Uncontrolled type 2 diabetes mellitus with hyperglycemia ([facility] [address]     3.  Localized edema          Plan:  Lasix 20 mg PO QOD  CBC & BMP on Monday  Insulin sliding scale AC and HS

## 2022-06-13 ENCOUNTER — HOSPITAL ENCOUNTER (OUTPATIENT)
Age: 62
Setting detail: SPECIMEN
Discharge: HOME | End: 2022-06-13

## 2022-06-13 ENCOUNTER — CARE COORDINATION (OUTPATIENT)
Dept: OTHER | Facility: CLINIC | Age: 62
End: 2022-06-13

## 2022-06-13 ENCOUNTER — TELEPHONE (OUTPATIENT)
Dept: FAMILY MEDICINE CLINIC | Age: 62
End: 2022-06-13

## 2022-06-13 LAB
ABSOLUTE EOS #: 0.67 K/UL (ref 0–0.44)
ABSOLUTE IMMATURE GRANULOCYTE: 0.09 K/UL (ref 0–0.3)
ABSOLUTE LYMPH #: 2.71 K/UL (ref 1.1–3.7)
ABSOLUTE MONO #: 0.87 K/UL (ref 0.1–1.2)
ANION GAP SERPL CALCULATED.3IONS-SCNC: 14 MMOL/L (ref 9–17)
BASOPHILS # BLD: 1 % (ref 0–2)
BASOPHILS ABSOLUTE: 0.07 K/UL (ref 0–0.2)
BUN BLDV-MCNC: 22 MG/DL (ref 8–23)
CALCIUM SERPL-MCNC: 8.9 MG/DL (ref 8.6–10.4)
CHLORIDE BLD-SCNC: 101 MMOL/L (ref 98–107)
CO2: 25 MMOL/L (ref 20–31)
CREAT SERPL-MCNC: 1.07 MG/DL (ref 0.5–0.9)
EOSINOPHILS RELATIVE PERCENT: 6 % (ref 1–4)
GFR AFRICAN AMERICAN: >60 ML/MIN
GFR NON-AFRICAN AMERICAN: 52 ML/MIN
GFR SERPL CREATININE-BSD FRML MDRD: ABNORMAL ML/MIN/{1.73_M2}
GLUCOSE BLD-MCNC: 157 MG/DL (ref 70–99)
HCT VFR BLD CALC: 33.7 % (ref 36.3–47.1)
HEMOGLOBIN: 10.3 G/DL (ref 11.9–15.1)
IMMATURE GRANULOCYTES: 1 %
LYMPHOCYTES # BLD: 25 % (ref 24–43)
MCH RBC QN AUTO: 28.7 PG (ref 25.2–33.5)
MCHC RBC AUTO-ENTMCNC: 30.6 G/DL (ref 28.4–34.8)
MCV RBC AUTO: 93.9 FL (ref 82.6–102.9)
MONOCYTES # BLD: 8 % (ref 3–12)
NRBC AUTOMATED: 0 PER 100 WBC
PDW BLD-RTO: 13.4 % (ref 11.8–14.4)
PLATELET # BLD: 294 K/UL (ref 138–453)
PMV BLD AUTO: 10.2 FL (ref 8.1–13.5)
POTASSIUM SERPL-SCNC: 4.2 MMOL/L (ref 3.7–5.3)
RBC # BLD: 3.59 M/UL (ref 3.95–5.11)
SEG NEUTROPHILS: 59 % (ref 36–65)
SEGMENTED NEUTROPHILS ABSOLUTE COUNT: 6.51 K/UL (ref 1.5–8.1)
SODIUM BLD-SCNC: 140 MMOL/L (ref 135–144)
WBC # BLD: 10.9 K/UL (ref 3.5–11.3)

## 2022-06-13 PROCEDURE — 85025 COMPLETE CBC W/AUTO DIFF WBC: CPT

## 2022-06-13 PROCEDURE — 36415 COLL VENOUS BLD VENIPUNCTURE: CPT

## 2022-06-13 PROCEDURE — P9603 ONE-WAY ALLOW PRORATED MILES: HCPCS

## 2022-06-13 PROCEDURE — 80048 BASIC METABOLIC PNL TOTAL CA: CPT

## 2022-06-13 NOTE — TELEPHONE ENCOUNTER
[facility] Initial Follow Up Call    Outreach made within 2 business days of discharge: Yes    Patient: Kaylene Baker   Patient : 1960   MRN: [id number]    Reason for Admission: Back pain  Discharge Date: 6/10/2022       Spoke with: care coordination    Discharge department/facility: [facility]    Scheduled appointment with PCP within 7-14 days    Patient has been discharged to [facility] for inpatient rehab.     Follow Up  Future Appointments   Date Time Provider Nicola Zaidi   2022  1:00  [place] Kenneth López MD [facility] [address] [place] [address]   2022  8:30 AM Ryan Cardozo MD [address] [place] [address]

## 2022-06-13 NOTE — CARE COORDINATION
ACM assigned. Chart reviewed. Patient is currently at [facility] for inpatient rehab. Will contact patient after discharge.     Randa Bartlett RN  Associate Care Manager  Cell: [phone]

## 2022-06-14 ENCOUNTER — OUTSIDE SERVICES (OUTPATIENT)
Dept: PRIMARY CARE CLINIC | Age: 62
End: 2022-06-14

## 2022-06-14 DIAGNOSIS — I10 PRIMARY HYPERTENSION: ICD-10-CM

## 2022-06-14 DIAGNOSIS — G56.23 ULNAR NEUROPATHY OF BOTH UPPER EXTREMITIES: ICD-10-CM

## 2022-06-14 DIAGNOSIS — R60.0 LOCALIZED EDEMA: Primary | ICD-10-CM

## 2022-06-14 DIAGNOSIS — M54.16 LUMBAR RADICULOPATHY: ICD-10-CM

## 2022-06-14 ASSESSMENT — ENCOUNTER SYMPTOMS
NAUSEA: 0
VOMITING: 0
SHORTNESS OF BREATH: 0
COUGH: 0
DIARRHEA: 0

## 2022-06-14 NOTE — ASSESSMENT & PLAN NOTE
unknown cause, but jsut happened since she was in the hospital-? due to walker use?  continue same meds- ppt advised of ways to take pressure off of the ulnar nerve- if not better could increase the gabapenin

## 2022-06-14 NOTE — PROGRESS NOTES
Tracey Pineda is a 65 y.o. female being seen for her weekly follow up. Location of visit: [facility]    HPI:  New today:Pt c/o ulnar dist numbness. Also left sided pyriformis/ sciatic type pain. Has 21 steps to get into dwelling so could not go home. Pain is better but still there. Here to work on strength so she can get home         Review of Systems   Constitutional: Negative for activity change, appetite change, chills, diaphoresis and fever. HENT: Negative for congestion. Respiratory: Negative for cough and shortness of breath. Cardiovascular: Negative for chest pain and palpitations. Gastrointestinal: Negative for diarrhea, nausea and vomiting. Genitourinary: Negative for hematuria. Musculoskeletal: Negative for arthralgias and myalgias. Skin: Negative for rash and wound. Neurological: Negative for weakness and headaches. Psychiatric/Behavioral: Negative for agitation, dysphoric mood and sleep disturbance. The patient is not nervous/anxious. Physical Exam  Vitals reviewed. Constitutional:       General: She is not in acute distress. HENT:      Head: Normocephalic and atraumatic. Nose: No congestion or rhinorrhea. Eyes:      General:         Right eye: No discharge. Left eye: No discharge. Cardiovascular:      Rate and Rhythm: Normal rate and regular rhythm. Pulmonary:      Effort: Pulmonary effort is normal. No respiratory distress. Breath sounds: Normal breath sounds. Abdominal:      Palpations: Abdomen is soft. Tenderness: There is no abdominal tenderness. Musculoskeletal:      Right lower leg: No edema. Left lower leg: No edema. Skin:     General: Skin is warm and dry. Neurological:      Mental Status: She is alert. Mental status is at baseline. ASSESSMENT:   Diagnosis Orders   1. Localized edema      restart chlorthalidone   2. Primary hypertension     3. Lumbar radiculopathy     4.  Ulnar neuropathy of both upper extremities         PLAN:    1. Localized edema  Comments:  restart chlorthalidone  2. Primary hypertension  Assessment & Plan:   Well-controlled, continue current medications  3. Lumbar radiculopathy  Assessment & Plan:   Uncontrolled, continue current medications, continue therapy- if not improving will consider increasing gabapentin  4.  Ulnar neuropathy of both upper extremities  Assessment & Plan:   unknown cause, but jsut happened since she was in the hospital-? due to walker use?  continue same meds- ppt advised of ways to take pressure off of the ulnar nerve- if not better could increase the gabapenin

## 2022-06-14 NOTE — ASSESSMENT & PLAN NOTE
Uncontrolled, continue current medications, continue therapy- if not improving will consider increasing gabapentin

## 2022-06-20 ENCOUNTER — CARE COORDINATION (OUTPATIENT)
Dept: OTHER | Facility: CLINIC | Age: 62
End: 2022-06-20

## 2022-06-20 NOTE — CARE COORDINATION
[facility] Update Call    2022    Patient: Tiarra Tran Patient : 1960   MRN: [id number]  Reason for Admission: Back Pain  Discharge Date: 6/10/22 RARS: Readmission Risk Score: 12.6 ( )      ACM contacted [facility] ARU department. Per Jude Carrizales RN the patient is currently admitted to their ARU department, receiving daily PT and OT therapies. No anticipated discharge date or barriers noted today. ACM explaned Associate Care Management Program's role and provided contact information should discharge barriers be identified in the future. ACM will send referral to [facility]SS to follow for discharge and monitor for discharge barriers.

## 2022-06-21 ENCOUNTER — TELEPHONE (OUTPATIENT)
Dept: FAMILY MEDICINE CLINIC | Age: 62
End: 2022-06-21

## 2022-06-21 ENCOUNTER — OUTSIDE SERVICES (OUTPATIENT)
Dept: PRIMARY CARE CLINIC | Age: 62
End: 2022-06-21

## 2022-06-21 DIAGNOSIS — I10 PRIMARY HYPERTENSION: ICD-10-CM

## 2022-06-21 DIAGNOSIS — G56.23 ULNAR NEUROPATHY OF BOTH UPPER EXTREMITIES: ICD-10-CM

## 2022-06-21 DIAGNOSIS — M54.16 LUMBAR RADICULOPATHY: ICD-10-CM

## 2022-06-21 ASSESSMENT — ENCOUNTER SYMPTOMS
VOMITING: 0
SHORTNESS OF BREATH: 0
NAUSEA: 0
DIARRHEA: 0
COUGH: 0

## 2022-06-21 NOTE — PROGRESS NOTES
Julio Granger is a 65 y.o. female being seen for her weekly follow up. Location of visit: [facility]    HPI:  New today:Pt still with a lot of pain in right wrist sally. Thinks she overdid it with the sponge and hand and wrist exercises        Review of Systems   Constitutional: Negative for activity change, appetite change, chills, diaphoresis and fever. HENT: Negative for congestion. Respiratory: Negative for cough and shortness of breath. Cardiovascular: Negative for chest pain and palpitations. Gastrointestinal: Negative for diarrhea, nausea and vomiting. Genitourinary: Negative for hematuria. Musculoskeletal: Negative for arthralgias and myalgias. Skin: Negative for rash and wound. Neurological: Negative for weakness and headaches. Psychiatric/Behavioral: Negative for agitation, dysphoric mood and sleep disturbance. The patient is not nervous/anxious. Physical Exam  Vitals reviewed. Constitutional:       General: She is not in acute distress. HENT:      Head: Normocephalic and atraumatic. Nose: No congestion or rhinorrhea. Eyes:      General:         Right eye: No discharge. Left eye: No discharge. Cardiovascular:      Rate and Rhythm: Normal rate and regular rhythm. Pulmonary:      Effort: Pulmonary effort is normal. No respiratory distress. Breath sounds: Normal breath sounds. Abdominal:      Palpations: Abdomen is soft. Tenderness: There is no abdominal tenderness. Musculoskeletal:      Right lower leg: No edema. Left lower leg: No edema. Skin:     General: Skin is warm and dry. Neurological:      Mental Status: She is alert. Mental status is at baseline. ASSESSMENT:   Diagnosis Orders   1. Ulnar neuropathy of both upper extremities     2. Lumbar radiculopathy     3. Primary hypertension         PLAN:    1.  Ulnar neuropathy of both upper extremities  Assessment & Plan:   still having a lot of pain but does not want to become addicted to pain meds. Gabapentin increased   2. Lumbar radiculopathy  Assessment & Plan:   continue with therapy  3.  Primary hypertension  Assessment & Plan:   Well-controlled, continue current medications

## 2022-06-21 NOTE — TELEPHONE ENCOUNTER
6/21/2022 [employer] FMLA form sent to office for completion, scanned blank copy in chart and placed in [address] box.

## 2022-06-24 ENCOUNTER — OUTSIDE SERVICES (OUTPATIENT)
Dept: PRIMARY CARE CLINIC | Age: 62
End: 2022-06-24

## 2022-06-24 ENCOUNTER — CARE COORDINATION (OUTPATIENT)
Dept: OTHER | Facility: CLINIC | Age: 62
End: 2022-06-24

## 2022-06-24 VITALS
SYSTOLIC BLOOD PRESSURE: 122 MMHG | RESPIRATION RATE: 16 BRPM | DIASTOLIC BLOOD PRESSURE: 86 MMHG | TEMPERATURE: 98.4 F | HEART RATE: 80 BPM

## 2022-06-24 DIAGNOSIS — G56.23 ULNAR NEUROPATHY OF BOTH UPPER EXTREMITIES: ICD-10-CM

## 2022-06-24 DIAGNOSIS — E11.65 UNCONTROLLED TYPE 2 DIABETES MELLITUS WITH HYPERGLYCEMIA (HCC): ICD-10-CM

## 2022-06-24 DIAGNOSIS — M54.16 LUMBAR RADICULOPATHY: Primary | ICD-10-CM

## 2022-06-24 ASSESSMENT — ENCOUNTER SYMPTOMS
CONSTIPATION: 0
BACK PAIN: 1
SHORTNESS OF BREATH: 0
COUGH: 0
DIARRHEA: 0
NAUSEA: 0

## 2022-06-24 NOTE — CARE COORDINATION
[facility]SS performed chart review to monitor patient status. [address] per Alexandra Roberts (RN) patient is being discharged home today with [facility] . [facility] will update RN on patient status.

## 2022-06-25 NOTE — PROGRESS NOTES
Harley Estrada is a 65 y.o. female being seen for her weekly follow up. Location of visit: [facility]    Visit Date:  6/24/2022     Reason for Visit:    Chief Complaint   Patient presents with    Back Pain        HPI   Resident was cut by insurance. She would like to stay longer and continue therapy. She is still having a lot of pain. Blood sugars improved. She is having some bilateral upper extremity numbness. Review of Systems   Constitutional: Negative for chills and fever. HENT: Negative for congestion and nosebleeds. Respiratory: Negative for cough and shortness of breath. Cardiovascular: Negative for chest pain and palpitations. Gastrointestinal: Negative for constipation, diarrhea and nausea. Genitourinary: Negative for difficulty urinating and dysuria. Musculoskeletal: Positive for arthralgias, back pain and gait problem. Skin: Negative for rash and wound. Neurological: Positive for weakness and numbness. Psychiatric/Behavioral: Negative for sleep disturbance. The patient is nervous/anxious. Physical Exam  Vitals and nursing note reviewed. Constitutional:       Appearance: She is obese. Cardiovascular:      Rate and Rhythm: Normal rate and regular rhythm. Pulmonary:      Effort: Pulmonary effort is normal.      Breath sounds: Normal breath sounds. Abdominal:      General: Bowel sounds are normal.      Palpations: Abdomen is soft. Musculoskeletal:      Lumbar back: Tenderness present. Decreased range of motion. Skin:     Findings: No rash. Neurological:      Mental Status: She is alert.    Psychiatric:         Mood and Affect: Mood normal.         Behavior: Behavior normal.          Allergies   Allergen Reactions    Codeine Nausea And Vomiting        Past Medical History:   Diagnosis Date    Cervical spondylosis 4/2009    c-4 c-5, c-5 c-6, c-6 c-7    Generalized anxiety disorder 12/8/2020    Hyperlipidemia     Hypertension     Lumbar radiculopathy     DIEGO (nonalcoholic steatohepatitis) 10/12/2014    Obesity     Osteoarthritis of left knee 8/19/2014    Restless legs syndrome     Type II or unspecified type diabetes mellitus without mention of complication, not stated as uncontrolled         ASSESSMENT:  /86   Pulse 80   Temp 98.4 °F (36.9 °C)   Resp 16       Diagnosis Orders   1. Lumbar radiculopathy     2. Uncontrolled type 2 diabetes mellitus with hyperglycemia ([facility] [address]     3. Ulnar neuropathy of both upper extremities          Plan:  1. Continue Percocet 5/325 mg PO Q 6 hours PRN pain  2. Outpatient physical therapy and aquatic therapy.

## 2022-06-27 ENCOUNTER — CARE COORDINATION (OUTPATIENT)
Dept: OTHER | Facility: CLINIC | Age: 62
End: 2022-06-27

## 2022-06-27 ENCOUNTER — TELEPHONE (OUTPATIENT)
Dept: FAMILY MEDICINE CLINIC | Age: 62
End: 2022-06-27

## 2022-06-27 NOTE — CARE COORDINATION
Raul [facility] Transitions Initial Follow Up Call    Call within 2 business days of discharge: Yes    Patient: Stefan Garzonmp Patient : 1960   MRN: [id number]  Reason for Admission:  sciatica of left side, low back pain with sciatica  Discharge Date: 6/10/22 RARS: Readmission Risk Score: 12.6 ( )      Last Discharge [facility]       Complaint Diagnosis Description Type Department Provider    22 Back Pain Sciatica of left side . .. ED to Hosp-Admission (Discharged) (ADMITTED) Brannon Rod MD; Elvira Quiñones. .. Transitions of Care Initial Call    Was this an external facility discharge? Yes, 2022 Discharge Facility: [address] [place] [address] to be reviewed by the provider   Additional needs identified to be addressed with provider: No  none             Method of communication with provider : none    Advance Care Planning:   Does patient have an Advance Directive: not on file. Ambulatory Care Manager contacted the patient by telephone to perform post hospital discharge assessment. Verified name and  with patient as identifiers. Provided introduction to self, and explanation of the ACM role. ACM reviewed discharge instructions, medical action plan and red flags with patient who verbalized understanding. Patient given an opportunity to ask questions and does not have any further questions or concerns at this time. Were discharge instructions available to patient? Yes. Reviewed appropriate site of care based on symptoms and resources available to patient including: PCP  Specialist  Benefits related nurse triage line  Urgent care clinics  [facility]   When to call [address] The patient agrees to contact the PCP office for questions related to their healthcare. Medication reconciliation was performed with patient, who verbalizes understanding of administration of home medications.  Advised obtaining a 90-day supply of all daily and as-needed medications. Was patient discharged with a pulse oximeter? no    [facility] provided contact information. Plan for follow-up call in 5-7 days based on severity of symptoms and risk factors. Plan for next call: aquatic therapy, neuro appt, back pain status     Patient was inpatient at [facility] from 5/31/2022 to 6/10/2022. Patient then went to [facility] for inpatient rehab and was discharged on 6/24/2022. Patient was discharged with outpatient PT and aquatic therapy. Patient states she does not have home care. No medication changes since discharge. Patient states she was just getting ready to call to schedule her aquatic therapy. States she was doing this prior to hospitalization and had made great progress. Patient states she is still having some discomfort in lower back that extends to her hip, thigh, and down her left leg. Using a cane or walker to ambulate. Patient states when she was in the hospital, the only comfortable position she could tolerate was sitting in a chair and leaning over on pillows onto the bed. Patient states this has caused some ulnar nerve issues and now has numbness in pinky and ring fingers and palm of hands. States it is bilaterally but left hand is worse. States it is difficult for her to make a fist. Patient states she also has a order for OT for hands if aquatic therapy does not help. Patient also has a neurology appt next week and will discuss this. Patient states when she was at [facility], they did not give her meals that were diabetic. States she had a ton of carbs on the plate so she really didn't eat that much during her stay. Last A1C was 7.9 on 5/26/2022. States her blood sugars fluctuated while at [facility] but now since she is home they are running 119. [facility] offered patient diabetic education sheets and Dial a Dietician number. Patient would like this emailed to [email or web address].     [facility] E-Mailed the following DM education handouts:    Tegan: What is Diabetes, Know Your Numbers, Checking BG, Hypoglycemia, Hyperglycemia, DM & Activity, Sick Day Management, Planning Healthy Meals, Nutrition Label   Abbott: Dial a Dietitian            Care Transitions 24 Hour Call    Schedule Follow Up Appointment with PCP: Completed  Do you have a copy of your discharge instructions?: Yes  Do you have all of your prescriptions and are they filled?: Yes  Have you been contacted by a "[employer]" Avenue?: No  Have you scheduled your follow up appointment?: Yes  How are you going to get to your appointment?: Car - family or friend to transport  Do you have support at home?: Partner/Spouse/SO, Child  Do you feel like you have everything you need to keep you well at home?: Yes  Are you an active caregiver in your home?: Yes  Care Transitions Interventions         Follow Up  Future Appointments   Date Time Provider Nicola Zaidi   7/6/2022  1:00  Geovani López MD Neuro Nicola Sarkar RN

## 2022-06-27 NOTE — TELEPHONE ENCOUNTER
Pt contacted office, was suppose to return to work soon, unable to due to not being able to walk, using walker still pt stated, hands are messed up also, would like for provider to contact her at [phone]. FMLA forms will be faxed over to be updated.  Extension needed until 7-, pt returning to work on 7-21-22 pt stated

## 2022-06-29 ENCOUNTER — OFFICE VISIT (OUTPATIENT)
Dept: FAMILY MEDICINE CLINIC | Age: 62
End: 2022-06-29
Payer: COMMERCIAL

## 2022-06-29 VITALS
WEIGHT: 194 LBS | HEIGHT: 62 IN | SYSTOLIC BLOOD PRESSURE: 118 MMHG | TEMPERATURE: 96.7 F | DIASTOLIC BLOOD PRESSURE: 76 MMHG | BODY MASS INDEX: 35.7 KG/M2 | HEART RATE: 86 BPM

## 2022-06-29 DIAGNOSIS — M54.16 LUMBAR RADICULOPATHY: Primary | ICD-10-CM

## 2022-06-29 PROCEDURE — 99213 OFFICE O/P EST LOW 20 MIN: CPT | Performed by: STUDENT IN AN ORGANIZED HEALTH CARE EDUCATION/TRAINING PROGRAM

## 2022-06-29 NOTE — PROGRESS NOTES
Visit Information    Have you changed or started any medications since your last visit including any over-the-counter medicines, vitamins, or herbal medicines? no   Have you stopped taking any of your medications? Is so, why? -  no  Are you having any side effects from any of your medications? - no    Have you seen any other physician or provider since your last visit? yes - Rehab   Have you had any other diagnostic tests since your last visit? yes - Labs, US, XR, MRI, Echo   Have you been seen in the emergency room and/or had an admission in a hospital since we last saw you?  yes - [facility]   Have you had your routine dental cleaning in the past 6 months?  no     Do you have an active MyChart account? If no, what is the barrier?   Yes    Patient Care Team:  Cassie Zambrano MD as PCP - General (Family Medicine)  Cassie Zambrano MD as PCP [facility]  Karen Bowens MD as Consulting Physician (Gastroenterology)  Nikki Parson RN as [address] [place] [address] History Review  Past Medical, Family, and Social History reviewed and does not contribute to the patient presenting condition    Health Maintenance   Topic Date Due    Shingles vaccine (1 of 2) Never done    Diabetic retinal exam  11/01/2017    Diabetic foot exam  12/01/2017    COVID-19 Vaccine (3 - Booster for Jackquline [place] series) 07/11/2021    Diabetic microalbuminuria test  06/11/2022    Lipids  06/24/2022    DTaP/Tdap/Td vaccine (2 - Tdap) 07/26/2022 (Originally 12/15/2020)    Pneumococcal 0-64 years Vaccine (2 - PCV) 07/26/2022 (Originally 11/27/2016)    Colorectal Cancer Screen  09/20/2022    Breast cancer screen  11/14/2022    A1C test (Diabetic or Prediabetic)  05/26/2023    Depression Screen  05/26/2023    Flu vaccine  Completed    Hepatitis C screen  Completed    HIV screen  Completed    Hepatitis A vaccine  Aged Out    Hib vaccine  Aged Out    Meningococcal (ACWY) vaccine  Aged Out

## 2022-06-29 NOTE — PROGRESS NOTES
[address] [facility] - Outpatient Note      Subjective:      Julio Granger is a 65 y.o. female  presented to the office on 06/29/22 with complaints of: Back pain follow-up    Patient has chronic back pain for last several years. She was hospitalized last month and stayed there for 10 days. Recent MRI shows disc bulge at L5-S1 causing impingement on nerve root of S1. She is here for [facility] paperwork. She works at [employer], stays mainly a desk. She was seeing Dr. Dion Donaldson, spine orthopedics for her back pain but wants to try a neurosurgeon now. Review of systems  CONSTITUTIONAL: Negative  HEENT: Negative  RESPIRATORY: Negative  CARDIOVASCULAR: Negative  GASTROINTESTINAL: Negative  GENITOURINARY: Negative   ENDOCRINE: Negative  MUSCULOSKELETAL: Chronic lower back pain  NEUROLOGICAL: Negative  BEHAVIOR/PSYCH: Negative      Objective:      Vitals:    06/29/22 1436   BP: 118/76   Pulse: 86   Temp: (!) 96.7 °F (35.9 °C)        General Appearance - Alert and oriented x 3. In some discomfort from back pain   HEENT - No obvious deformity   Lungs - Bilateral good air entry , no wheezes or rales   Cardiovascular - Regular rate and rhythm. No murmur   Abdomen - Soft and nontender   Neurologic - No new focal motor or sensory deficits   Skin - No bruising or bleeding on exposed skin area   MSK -lumbar spine tenderness   Psych - normal affect       Assessment and Plan:    1. [facility] paperwork filled. Do believe she will benefit from some time off from work to go to rehab and get his strength back. She will have some limitation using keyboard because of ulnar neuropathy which she believes is from leaning on her elbows during hospital stay due to back pain. She reports she is trying home exercises for neuropathy. If it does not improve in a couple weeks time we will try EMG. 2. Follow-up with neurosurgery for worsening back pain.   3. Advised to go to emergency if he develops severe back pain, hypertension, weakness in legs, bowel/bladder incontinence. ICD-10-CM    1. Lumbar radiculopathy  [place]  Brooke Britton DO, Neurosurgery, [place]       Return in about 6 weeks (around 8/10/2022) for back pain f/u. Requested Prescriptions      No prescriptions requested or ordered in this encounter       There are no discontinued medications. Kavya received counseling on the following healthy behaviors: nutrition, exercise and medication adherence    Discussed use, benefit, and side effects of prescribed medications. Barriers to medication compliance addressed. All patient questions answered. Pt voiced understanding. Disclaimer: Some orall of this note was transcribed using voice-recognition software. This may cause typographical errors occasionally. Although all effort is made to fix these errors, please do not hesitate to contact our office if there [place] concern with the understanding of this note.     Pam Wayne MD  Family Medicine PGY-2  06/29/22 at 4:19 PM

## 2022-06-29 NOTE — PATIENT INSTRUCTIONS
Thank you for letting us take care of you today. We hope all your questions were addressed. If a question was overlooked or something else comes to mind after you return home, please contact a member of your Care Team listed below. Your Care Team at [place] [address] is Team #3  Elsie Mcpherson MD (Faculty)  Ramirez Heath MD (Faculty  Alejandramary alice Pierson MD (Resident)  Naresh Woods (Resident)   Macrina Bingham MD (Resident)  Anais Tanner MD (Resident)  Flako Urban., FOREIGN Chow., FOREIGN Michele., LPN  Donna Cohn., Dotlester Allred., Charleen Bond ([facility])  Komal Goldberg, [facility] (Clinical Practice Manager)  Jonathan Harris [facility] (Clinical Pharmacist)     Office phone number: [phone]    If you need to get in right away due to illness, please be advised we have \"Same Day\" appointments available Monday-Friday. Please call us at [phone] option #3 to schedule your \"Same Day\" appointment.

## 2022-07-01 ENCOUNTER — TELEPHONE (OUTPATIENT)
Dept: PRIMARY CARE CLINIC | Age: 62
End: 2022-07-01

## 2022-07-01 DIAGNOSIS — M54.16 LUMBAR RADICULOPATHY: Primary | ICD-10-CM

## 2022-07-01 RX ORDER — OXYCODONE HYDROCHLORIDE AND ACETAMINOPHEN 5; 325 MG/1; MG/1
1 TABLET ORAL EVERY 6 HOURS PRN
Qty: 20 TABLET | Refills: 0 | Status: SHIPPED | OUTPATIENT
Start: 2022-07-01 | End: 2022-07-08

## 2022-07-01 NOTE — TELEPHONE ENCOUNTER
Pt was seen in [facility] gave pt a written rx for Percocet. Pharmacy will not take a written rx. Asking, if you can send it in to [facility]. Percocet 5/325 q 6 hrs prn, # 20.  BARBARA

## 2022-07-05 ENCOUNTER — CARE COORDINATION (OUTPATIENT)
Dept: OTHER | Facility: CLINIC | Age: 62
End: 2022-07-05

## 2022-07-05 ENCOUNTER — HOSPITAL ENCOUNTER (OUTPATIENT)
Dept: PHYSICAL THERAPY | Age: 62
Setting detail: THERAPIES SERIES
Discharge: HOME | End: 2022-07-05
Payer: COMMERCIAL

## 2022-07-05 PROCEDURE — 97110 THERAPEUTIC EXERCISES: CPT

## 2022-07-05 NOTE — PROGRESS NOTES
[address] [facility]  [address] [facility]         Phone: [phone]       Fax: [phone]    Physical Therapy Progress Note    Date: 2022      Patient: Gayle Gonsalez  : 1960  MRN: [id number]    Physician: Meryle Loan, MD (resident physician)  Alexsandra Green MD (attending [id number]  Insurance: [employer]ok [employer]. Auth required after 30 visits   Diagnosis: M54.16 (ICD-10-CM) - Lumbar radiculopathy Onset Date: 2021 with recent exacerbation 2022  Next Dr. Bailey Bias: 22  Total visits attended: 10/26  Cancels/No shows: 0/0  Date of initial visit: 22                    Subjective:  Pain:  [x] Yes  [] No  Location: L low back into L posterior thigh Pain Rating: (0-10 scale) 7/10 currently at rest. Elevates up to 10/10 at worst mostly with walking or bending, decreases to 4/10 at best when laying down. Pain altered Tx:  [x] No  [] Yes  Action:  Comments:  Pt returns to PT today after nearly a 6 week lapse in treatment secondary to a recent hospital admission that arose from intractable pain associated with her lumbar radiculopathy. Pt states that she had been feeling very good after participating in aquatic therapy and decided to start cleaning out some of her dresser drawers to get together some clothes to donate. After completing this activity and resting awhile, pt experienced severe shooting pains into her L LE to the point where she could not straighten her leg, or walk. Ended up going to the ED for an evaluation and where the medical team was unable to get her pain control, resulting in a hospital admission. Pt states that she was admitted for 10 days and was actually in the ICU for a short period of time secondary to disorientation and slowed respirations due to the amount of pain medication that she had to be on to control her sx.  Following her hospitalization, pt had to transition to subacute rehab until  due to severe weakness, deconditioning and continued trouble with extremely high pain levels. Pt was discharged home on 6/24 and pt states that pain has lessened slightly but is having to use a walker for mobility and is still unable to bend forwards or straighten her L leg out completely. Currently taking oxycodone on a PRN basis and follows up with neurology tomorrow. 7/10 pain currently in her L low back extending down to her posterior hip, thigh and calf. Pt also states that she has been having ulnar nerve issues since her hospitalization which she attributes to spending the majority of her hospital stay sitting with her forearms propped on a table due to inability to lay in bed at the time. Reports numbness in her 4th and 5th fingers, now primarily affecting her L hand, and states that for a couple weeks she was unable to fully close her hands. States that this is slowly improving but is still not able to type. Expresses high levels of frustration with this recent setback, tearful upon re-evaluation, but hopes that she can get back in the pool as she feels like this has been the most beneficial thing for improving her back and L LE sx.      Objective:     Range of Motion  Left Range of Motion  Right Strength  Left Strength  Right   Lumbar Flexion 0%  Unable 0%  Unable 3+/5 3+/5   Lumbar Extension 50%  L sided back tightness but no radicular sx 50% 3+/5 3+/5   Lumbar Rotation 50% 50% 3+/5 3+/5   Lumbar Side Bend 75%  Increased L LE radicular sx 75% 3+/5 3+/5   Hip Flexion WNL for all below WNL for all below 3/5 3+/5   Hip Abduction     3+/5 3+/5   Hip Adduction     4-/5 4-/5   Hip Extension     3/5 3+/5   Hip ER           Hip IR           Knee Flexion     3+/5 4/5   Knee Extension     3-/5 4/5   Dorsiflexion     4+/5 4+/5   Plantar flexion     4+/5 4+/5   Inversion           Eversion           *All LE MMT performed in modified (seated) positioning    30 second timed chair stand: 6 repetitions with moderate UE support      Assessment:  Pt has been seen for 10 PT visits to date, returning to clinic today after nearly a 6 week lapse in care secondary to a recent hospital admission due to a severe exacerbation of L LE radicular sx. Pt presents to PT re-evaluation ambulating in a very guarded manner, but at a modified independent level with a 2WW. The majority of session today was spent discussing recent sx and events surrounding pt's hospitalization where she stayed at [facility] for 10 days before transitioning to subacute rehab until 6/24. Pt demonstrates a significant change in status since prior reassessment, although still presenting to PT for same referring diagnosis. Continues to demonstrate L LE radicular sx extending from L lumbar region to L calf, worsening with standing/walking and forward bending with pt unable to initiate any kind of lumbar flexion today. +Slump test with pt unable to fully extend her L knee in sitting, suggesting likely neural involvement from known L5-S1 disc herniation compressing on pt's L S1 nerve root. Worsening weakness in L LE, but also bilaterally through B UEs and LEs from deconditioning associated from a prolonged hospital stay. Pt is following up with neurology tomorrow and is pending a neurosurgery consult, but in the meantime would like to continue PT to maximize strength and function again. Pt was previously getting very good relief from aquatics and pt expresses that she would like to resume her aquatics program again. Pt is guarded in her mobility but still appropriate to resume aquatics as tolerated. Educated pt to take her time entering/exiting pool, using handrail for B UE support and a step-to pattern on the stairs. Also provided option of use of lift chair to exit pool post-session if she feels like her legs are too fatigued, but I anticipate that pt will likely still be able to utilize the stairs with UE support.  Verbally discussed/reviewed home program from subacute rehab and also added seated lumbar extensions, standing marches/kicks, and hand intrinsics to regain function in pt's L hand. Good understanding and agreement to plan as discussed and POC extended to include 26 visits total given recent major setback from her hospitalization. STG: (to be met in 18 treatments)  1. Pt will self report worst pain no greater than 3/10 in order to better tolerate ADLs/work activities with minimal dysfunction SETBACK FROM RECENT HOSPITALIZATION   2. Pt will improve lumbar AROM to 90% or greater in all planes in order to demonstrate ability to move/reach in all planes unrestricted at PLOF SETBACK FROM RECENT HOSPITALIZATION  3. Pt will improve trunk strength to 4/5 or greater in all planes to show improved stability at prior level SETBACK FROM RECENT HOSPITALIZATION   4. Pt will self report reduced peripheral L LE pain (no distal than L knee) to demonstrate initial  favorable centralization response to therapy SETBACK FROM RECENT HOSPITALIZATION   LTG: (to be met in 26 treatments)  1. Pt will demonstrate improved B LE strength to 4/5 or greater in order to demonstrate improved stability/strength necessary for unrestricted ADLs/work activities SETBACK FROM RECENT HOSPITALIZATION  2. Pt will self report ability to complete all work duties with pain no greater than 1/10 to demonstrate unrestricted functional tolerances at prior level SETBACK FROM RECENT HOSPITALIZATION  3. Pt will improve core strength Sahrmann Grade 2/5 or greater to demonstrate better support and stability to lumbar spine SETBACK FROM RECENT HOSPITALIZATION   4. Pt will decrease SHEY to 10% impaired or less in order to demonstrate improved functional tolerances at PLOF with minimal restriction/dysfunction SETBACK FROM RECENT HOSPITALIZATION   5.  Pt will demonstrate independence with a long term HEP for continued progress/maintenance after completion of PT SETBACK FROM RECENT HOSPITALIZATION       Treatment to Date:  [x] Therapeutic Exercise   10229  [] Iontophoresis: 4 mg/mL Dexamethasone Sodium Phosphate  mAmin  38621   [] Therapeutic Activity  06923 [] Vasopneumatic cold with compression  05874    [] Gait Training   55330 [] Ultrasound   97715   [] Neuromuscular Re-education  52272 [] Electrical Stimulation Unattended  30074   [] Manual Therapy  26065 [] Electrical Stimulation Attended  70165   [x] Instruction in HEP  [] Lumbar/Cervical Traction  65795   [x] Aquatic Therapy   15058 [] Cold/hotpack    [] Massage   98294      [] Dry Needling, 1 or 2 muscles  00669   [] Biofeedback, first 15 minutes   85560  [] Biofeedback, additional 15 minutes   17320 [] Dry Needling, 3 or more muscles  41272      Patient Status:     [x] Continue per initial plan of care. [] Additional visits necessary. [] Other:      Treatment Plan:  [x] Therapeutic Exercise   16936  [] Iontophoresis: 4 mg/mL Dexamethasone Sodium Phosphate  mAmin  37904   [] Therapeutic Activity  91583 [] Vasopneumatic cold with compression  91947    [] Gait Training   00588 [] Ultrasound   47219   [] Neuromuscular Re-education  86271 [] Electrical Stimulation Unattended  28234   [] Manual Therapy  07636 [] Electrical Stimulation Attended  32834   [x] Instruction in HEP  [] Lumbar/Cervical Traction  29338   [x] Aquatic Therapy   88409 [] Cold/hotpack    [] Massage   51071      [] Dry Needling, 1 or 2 muscles  54084   [] Biofeedback, first 15 minutes   87904  [] Biofeedback, additional 15 minutes   94734 [] Dry Needling, 3 or more muscles  42662      Patient Status:     [] Continue per initial plan of care. [x] Additional visits necessary.     [] Other:     Requested Frequency/Duration: 2 times per week for 12 additional treatments (26 total visits on current POC)        PT Re-evaluation Time: 4:15-5:10pm (54' billed for skilled reassessment of all subjective & objective measures, all goals and extension of current POC)   Today's treatment also includes HEP of seated lumbar extensions (10 reps x 2 TID), standing marches/kicks, and verbally discussing hand intrinsic activities to regain function in L hand/fingers    Treatment Charges: Minutes Units   []  Ultrasound     []  Electrical-Stim     []  Iontophoresis     []  Traction     []  Massage       []  Eval     []  Gait     [x]  Ther Exercise 55  4   []  Manual Therapy       []  Ther Activities       []  Aquatics     []  Vasopneumatic Device     []  Neuro Re-Ed       []  Other       Total Treatment Time: 54 4         Electronically signed by: Kaz Alejandro PT    If you have any questions or concerns, please don't hesitate to call. Thank you for your referral.    Physician Signature:________________________________Date:__________________  By signing above or cosigning this note, I have reviewed this plan of care and certify a need for medically necessary rehabilitation services.      *PLEASE SIGN ABOVE AND FAX BACK ALL PAGES*

## 2022-07-05 NOTE — CARE COORDINATION
Care Transitions Outreach Attempt    Call within 2 business days of discharge: No   Attempted to reach patient for transitions of care follow up. Unable to reach patient. Patient: Mir Aiken Patient : 1960 MRN: [id number]    Last Discharge [address] [address]       Complaint Diagnosis Description Type Department Provider    22 Back Pain Sciatica of left side . .. ED to Hosp-Admission (Discharged) (ADMITTED) Rony Cooper MD; Rumalda Leventhal. .. Was this an external facility discharge?  Yes, 2022 Discharge Facility: [facility]    Noted following upcoming appointments from discharge chart review:   [facility] follow up appointment(s):   Future Appointments   Date Time Provider Nicola Zaidi   2022  4:15 PM Nolvia President, [address] [place] [address]   2022  1:00  [facility], MD Neuro [facility]   2022  8:30 AM MD Paulina Castaneda TOP     Non-[facility] follow up appointment(s):

## 2022-07-06 ENCOUNTER — HOSPITAL ENCOUNTER (OUTPATIENT)
Age: 62
Discharge: HOME | End: 2022-07-06
Payer: COMMERCIAL

## 2022-07-06 ENCOUNTER — TELEPHONE (OUTPATIENT)
Dept: FAMILY MEDICINE CLINIC | Age: 62
End: 2022-07-06

## 2022-07-06 ENCOUNTER — OFFICE VISIT (OUTPATIENT)
Dept: NEUROLOGY | Age: 62
End: 2022-07-06
Payer: COMMERCIAL

## 2022-07-06 VITALS
HEIGHT: 62 IN | DIASTOLIC BLOOD PRESSURE: 85 MMHG | OXYGEN SATURATION: 97 % | WEIGHT: 194 LBS | HEART RATE: 79 BPM | BODY MASS INDEX: 35.7 KG/M2 | SYSTOLIC BLOOD PRESSURE: 136 MMHG

## 2022-07-06 DIAGNOSIS — M54.42 CHRONIC LEFT-SIDED LOW BACK PAIN WITH LEFT-SIDED SCIATICA: ICD-10-CM

## 2022-07-06 DIAGNOSIS — M54.42 CHRONIC LEFT-SIDED LOW BACK PAIN WITH LEFT-SIDED SCIATICA: Primary | ICD-10-CM

## 2022-07-06 DIAGNOSIS — G89.29 CHRONIC LEFT-SIDED LOW BACK PAIN WITH LEFT-SIDED SCIATICA: Primary | ICD-10-CM

## 2022-07-06 DIAGNOSIS — G89.29 CHRONIC LEFT-SIDED LOW BACK PAIN WITH LEFT-SIDED SCIATICA: ICD-10-CM

## 2022-07-06 LAB
ANION GAP SERPL CALCULATED.3IONS-SCNC: 16 MMOL/L (ref 9–17)
BUN BLDV-MCNC: 15 MG/DL (ref 8–23)
CALCIUM SERPL-MCNC: 9.3 MG/DL (ref 8.6–10.4)
CHLORIDE BLD-SCNC: 100 MMOL/L (ref 98–107)
CO2: 25 MMOL/L (ref 20–31)
CREAT SERPL-MCNC: 1 MG/DL (ref 0.5–0.9)
FOLATE: 11.1 NG/ML
GFR AFRICAN AMERICAN: >60 ML/MIN
GFR NON-AFRICAN AMERICAN: 56 ML/MIN
GFR SERPL CREATININE-BSD FRML MDRD: ABNORMAL ML/MIN/{1.73_M2}
GLUCOSE BLD-MCNC: 114 MG/DL (ref 70–99)
MAGNESIUM: 1.3 MG/DL (ref 1.6–2.6)
POTASSIUM SERPL-SCNC: 4.6 MMOL/L (ref 3.7–5.3)
SODIUM BLD-SCNC: 141 MMOL/L (ref 135–144)
TSH SERPL DL<=0.05 MIU/L-ACNC: 1.37 UIU/ML (ref 0.3–5)
VITAMIN B-12: 617 PG/ML (ref 232–1245)
VITAMIN D 25-HYDROXY: 45.9 NG/ML

## 2022-07-06 PROCEDURE — 84443 ASSAY THYROID STIM HORMONE: CPT

## 2022-07-06 PROCEDURE — 36415 COLL VENOUS BLD VENIPUNCTURE: CPT

## 2022-07-06 PROCEDURE — 83735 ASSAY OF MAGNESIUM: CPT

## 2022-07-06 PROCEDURE — 82306 VITAMIN D 25 HYDROXY: CPT

## 2022-07-06 PROCEDURE — 99212 OFFICE O/P EST SF 10 MIN: CPT | Performed by: STUDENT IN AN ORGANIZED HEALTH CARE EDUCATION/TRAINING PROGRAM

## 2022-07-06 PROCEDURE — 82607 VITAMIN B-12: CPT

## 2022-07-06 PROCEDURE — 80048 BASIC METABOLIC PNL TOTAL CA: CPT

## 2022-07-06 PROCEDURE — 82746 ASSAY OF FOLIC ACID SERUM: CPT

## 2022-07-06 RX ORDER — CALCIUM CARBONATE/VITAMIN D3 500-10/5ML
400 LIQUID (ML) ORAL DAILY
Qty: 30 CAPSULE | Refills: 3 | Status: SHIPPED | OUTPATIENT
Start: 2022-07-06

## 2022-07-06 NOTE — TELEPHONE ENCOUNTER
Pharmacy contacting office because they need new orders for patient DM supplies. Patients insurance will only cover [employer] product. Please advise.

## 2022-07-06 NOTE — PROGRESS NOTES
[address] [place] [address]  [address] [id number]  Dept: [phone]  Dept Fax: [phone]    NEUROLOGY CLINIC NOTE       PATIENT NAME: Drake Brambila  PATIENT MRN: [id number]  PRIMARY CARE PHYSICIAN: Faina Teresa MD    HPI:      Drake Brambila is a 65 y.o. female. 65years old female patient. She follows up with neurology as a case of left hip and low back pain, was referred by family medicine in 4/6/22 which was her first visit to our neurology service. This is a follow up visit to our neurology clinic. Last office visit was 4/6/22. Today:  - After the last visit 4/6/22, the patient had some relief in her back pain. However in May 31st/2022 it appears she had an exacerbation of her low back pain following just simply folding some clothes. She went to [facility] and was hospitalized for severe low back pain that was treated with opioids. She was hospitalized for 10 days after she had hypotension and unresponsivenes and was transferred to the ICU. She was discharged awake and alert and oriented but her back pain persisted. She is to start physical therapy next week. - she reported bilateral left ulnar side of each wist numbness that extends all the way back to the elbow. She mentioned she was lying on her abdomen in the ER for prolonged time on the examination bed. .  - low back pain still there, constant all day. She was discharged on some percocet with gabapentin that was given as 300 mg in the am and 600 mg in the pm. No new red flags, no incontiencce or fever. Interval history:     65 female patient. She was first seen and evaluated in our neurology service in 4/6/22; she was tearing in pain with low back, left hip and leg pain. Initial onset of pain was 1 year ago but has been acutely worsening over the past week prior to this visit.  Pain is was 9 out of 10 and is affecting ADLs; affecting patient's ability to walk or sleep. Had Toradol injection at the family medicine office which partially helped with her pain. She now describes a low back pain that is more on the left side radiating down to the posterior aspect of the thigh down to the knee then travels laterally over the tibia down to the big toe associated with numbness. Constant. Partial relief with leaning forward and Toradol . Sharp and stabbing in nature, increased by lying on the left side. Now she's using a cane for ambulation. She tried over-the-counter medications and steroids in the past with no relief. Mild improvement seen in past with aquatic therapy and Flexeril. She was started on gabapentin by orthopedics one week before the neurology office visit; 300 mg 3 times daily with no improvement seen as of yet back at that time. She had epidural lumbar injection back in November 2021 and worked with physical therapy but reported partial relief of her symptoms      No red flags; no weight loss, no fever, no loss of appetite, no bladder or bowel incontinence      Recent imaging of the MRI C/T/L spine reviewed and were remarkable for mild degenerative changes with spinal stenosis of L3-L4, ligamentum flavum hypertrophy with face joint hypertrophy and paraspinal multifidus muscle atrophy. The patient had a follow up MRI L spine in 6/5/22 which showed no significant change. There was mentioning disc bulging at L5-S1 1 with asymmetric engorgement on the left lateral recess and abutment of the transversing the left S1 nerve root. MRI C-spine and T-spine showed multilevel degenerative change with moderate spinal canal narrowing at C5-6 and mild spinal canal narrowing at C4-5 and C6-7 to level neuroforaminal narrowing. Also degenerative change with mild spinal canal narrowing at T8-9 and T9-10 with bilateral neuroforaminal narrowing at T8-9.     PREVIOUS WORKUP:     Lab Results   Component Value Date    WBC 10.9 06/13/2022    HGB 10.3 (L) 06/13/2022    HCT 33.7 (L) 2022    MCV 93.9 2022     2022       Past Medical History:   Diagnosis Date    Cervical spondylosis 2009    c-4 c-5, c-5 c-6, c-6 c-7    Generalized anxiety disorder 2020    Hyperlipidemia     Hypertension     Lumbar radiculopathy     DIEGO (nonalcoholic steatohepatitis) 10/12/2014    Obesity     Osteoarthritis of left knee 2014    Restless legs syndrome     Type II or unspecified type diabetes mellitus without mention of complication, not stated as uncontrolled         Past Surgical History:   Procedure Laterality Date     SECTION      [facility]ONOSCOPY  2012    [facility]    HYSTERECTOMY, TOTAL ABDOMINAL (CERVIX REMOVED)      Polymenorrhagia    SHOULDER SURGERY Left 2021     SHOULDER MANIPULATION WITH PRE OP INTERSCALENE BLOCK     SHOULDER SURGERY Left 2021    SHOULDER MANIPULATION WITH PRE OP INTERSCALENE BLOCK and intraop injection performed by Jesus Hanna DO at [address] [place] [address]  2012    [facility]        Social History     Socioeconomic History    Marital status:      Spouse name: Not on file    Number of children: Not on file    Years of education: Not on file    Highest education level: Not on file   Occupational History    Not on file   Tobacco Use    Smoking status: Never Smoker    Smokeless tobacco: Never Used   Vaping Use    Vaping Use: Never used   Substance and Sexual Activity    Alcohol use:  Yes     Alcohol/week: 0.0 standard drinks     Comment: rarely/ 4 times a year    Drug use: No    Sexual activity: Yes   Other Topics Concern    Not on file   Social History Narrative    Not on file     Social Determinants of Health     Financial Resource Strain: Low Risk     Difficulty of Paying Living Expenses: Not very hard   Food Insecurity: No Food Insecurity    Worried About Running Out of Food in the Last Year: Never true    Valerie of Food in the Last Year: Never true   Transportation Needs:     Lack of Transportation (Medical): Not on file    Lack of Transportation (Non-Medical): Not on file   Physical Activity:     Days of Exercise per Week: Not on file    Minutes of Exercise per Session: Not on file   Stress:     Feeling of Stress : Not on file   Social Connections:     Frequency of Communication with Friends and Family: Not on file    Frequency of Social Gatherings with Friends and Family: Not on file    Attends Bahai Services: Not on file    Active Member of [employer] or Organizations: Not on file    Attends Club or Organization Meetings: Not on file    Marital Status: Not on file   Intimate Partner Violence:     Fear of Current or Ex-Partner: Not on file    Emotionally Abused: Not on file    Physically Abused: Not on file    Sexually Abused: Not on file   Housing Stability:     Unable to Pay for Housing in the Last Year: Not on file    Number of [place] in the Last Year: Not on file    Unstable Housing in the Last Year: Not on file        Current Outpatient Medications   Medication Sig Dispense Refill    oxyCODONE-acetaminophen (PERCOCET) 5-325 MG per tablet Take 1 tablet by mouth every 6 hours as needed for Pain for up to 7 days. Intended supply: 7 days. Take lowest dose possible to manage pain 20 tablet 0    busPIRone (BUSPAR) 10 MG tablet Take 1 tablet by mouth 2 times daily 60 tablet 0    gabapentin (NEURONTIN) 300 MG capsule Take 1 capsule by mouth 3 times daily for 30 days.  90 capsule 0    rOPINIRole (REQUIP) 1 MG tablet Take 1 tablet by mouth nightly 90 tablet 3    docusate sodium (COLACE) 100 mg capsule Take 1 capsule by mouth 2 times daily 60 capsule 0    rosuvastatin (CRESTOR) 20 MG tablet TAKE ONE TABLET BY MOUTH NIGHTLY 30 tablet 2    celecoxib (CELEBREX) 100 MG capsule Take 1 capsule by mouth 2 times daily 120 capsule 1    ASPIRIN LOW DOSE 81 MG EC tablet TAKE 1 TABLET BY MOUTH ONE TIME A DAY 30 tablet 2    glipiZIDE (GLUCOTROL) 10 MG tablet Take 1 tab bid 180 tablet 1    TRULICITY 1.5 HS/6.4II SOPN INJECT THE CONTENTS OF ONE SYRINGE UNDER THE SKIN ONCE WEEKLY 6 mL 2    SYNJARDY 5-1000 MG TABS TAKE 1 TABLET BY MOUTH 2 TIMES A  tablet 2    omeprazole (PRILOSEC) 20 MG delayed release capsule Take 1 capsule by mouth 2 times daily 180 capsule 3    albuterol sulfate HFA (PROVENTIL HFA) 108 (90 Base) MCG/ACT inhaler Inhale 2 puffs into the lungs every 4 hours as needed for Wheezing 2 Inhaler 5    blood glucose test strips (PRODIGY NO CODING BLOOD GLUC) strip Use to test once daily and as needed as directed by provider 100 each 3    diclofenac sodium (VOLTAREN) 1 % GEL Apply 2 g topically 2 times daily (Patient taking differently: Apply 2 g topically 2 times daily Prn pain) 100 g 2    bimatoprost (LUMIGAN) 0.01 % SOLN ophthalmic drops Place 1 drop into both eyes nightly      Cantargia LANCETS 28G MISC 1 Bottle by Does not apply route three times daily 100 each 3    Blood Glucose Monitoring Suppl (PRODIGY AUTOCODE BLOOD GLUCOSE) w/Device KIT 1 Device by Does not apply route 3 times daily 1 kit 0     No current facility-administered medications for this visit. Allergies   Allergen Reactions    Codeine Nausea And Vomiting        REVIEW OF SYSTEMS:     Review of Systems     Review of Systems    Constitutional: Negative for appetite change, chills, fever and unexpected weight change. Eyes: Negative for photophobia and pain. Negative for visual disturbance. Respiratory: Negative for cough and shortness of breath. Cardiovascular: Negative  for chest pain. Negative for palpitations. Gastrointestinal: Negative for abdominal pain, constipation, diarrhea, nausea and vomiting. Endocrine: Negative for polydipsia and polyuria. Genitourinary: Negative for difficulty urinating, dysuria and hematuria. Musculoskeletal: Negative for back pain and neck pain. Skin: Negative for pallor and rash. Neurological: Negative for facial asymmetry, weakness, numbness and headaches. Negative for dizziness, tremors, seizures, syncope, speech difficulty and light-headedness. Psychiatric/Behavioral: Negative for behavioral problems and hallucinations. VITALS  There were no vitals taken for this visit. PHYSICAL EXAMINATION:     Physical Exam   General appearance: cooperative  Skin: no rash or skin lesions. HEENT: normocephalic  Optic Fundi: deferred  Neck: supple: no cervcical adenopathy or carotid bruit  Lungs: clear to auscultation  Heart: Regular rate and rhythm, normal S1, S2. No murmurs, clicks or gallops.   Peripheral pulses: radial pulses palpable  Abdominal: BS present, soft, NT, ND  Extremities: no edema    NEUROLOGICAL EXAMINATION:     GENERAL  Appears comfortable and in no distress   HEENT  NC/ AT   HEART  S1 and S2 heard; palpation of pulses: radial pulse    NECK  Supple and no bruits heard   MENTAL STATUS:  Alert, oriented, intact memory, no confusion, normal speech, normal language, no hallucination or delusion   CRANIAL NERVES: II     -      Visual fields intact to confrontation  III,IV,VI -  PERR, EOMs full, no ptosis  V     -     Normal facial sensation   VII    -     Normal facial symmetry  VIII   -     Intact hearing   IX,X -     Symmetrical palate  XI    -     Symmetrical shoulder shrug  XII   -     Midline tongue, no atrophy    MOTOR FUNCTION: RUE: Significant for good strength of grade 5/5 in proximal and distal muscle groups   LUE: Significant for good strength of grade 5/5 in proximal and distal muscle groups   RLE: Significant for good strength of grade +4/5 in proximal and distal muscle groups   LLE: Significant for good strength of grade +4/5 in proximal and distal muscle groups      Normal bulk, normal tone and no involuntary movements, no tremor   SENSORY FUNCTION:  reduced sensation over the left radial nerve distrubution bilaterally    CEREBELLAR FUNCTION:  Intact fine motor mitral valve structure and function. No significant mitral regurgitation. Tricuspid valve was not well visualized. Insignificant tricuspid regurgitation, unable to estimate RVSP. IVC not well visualized. No significant pericardial effusion is seen. Signature ----------------------------------------------------------------------------  Electronically signed by Tawny Zelaya(Interpreting physician) on  06/06/2022 05:07 PM ---------------------------------------------------------------------------- ----------------------------------------------------------------------------  Electronically signed by Marisol Jorge(Sonographer) on 06/06/2022 02:37  PM ---------------------------------------------------------------------------- FINDINGS Left Atrium Left atrium is normal in size. Left Ventricle Small LV cavity. Hyperdynamic left ventricular function. Estimated LV EF 65-70 %. Normal wall thickness. No segmental wall motion abnormalities. Grade I (mild) left ventricular diastolic dysfunction. Right Atrium Right atrium was not well visualized. Right Ventricle Normal right ventricular size and function. Mitral Valve Normal mitral valve structure and function. No significant mitral regurgitation. Aortic Valve Aortic valve was not well visualized. No aortic insufficiency. No aortic stenosis. Tricuspid Valve Tricuspid valve was not well visualized. Insignificant tricuspid regurgitation, unable to estimate RVSP. Pulmonic Valve Pulmonic valve not well visualized but Doppler velocities are normal. No pulmonic insufficiency. Pericardial Effusion No significant pericardial effusion is seen. Miscellaneous Normal aortic root dimension. E/e' average 7.4 IVC not well visualized.  M-mode / 2D Measurements & Calculations:   LVIDd:3.19 cm(3.7 - 5.6 cm)      Diastolic GOHCUS:97.9 ml  GBCEF:8.20 cm(2.2 - 4.0 cm)      Systolic SMXHHM:9.64 ml  POMJ:1.31 cm(0.6 - 1.1 cm)       Aortic Root:2.4 cm(2.0 - 3.7 cm)  LVPWd:1.07 cm(0.6 - 1.1 cm)      LA explained by pain     PLAN:     1. Will refer the patient to neurosurgery; Pain management to see as well following neurosurgery appointment. 2. Patient will start PT next week  3. We can go back to the gabapentin of 300, 400, 300 as the patient reported more relief with this regimen   4. Will check B12, folate, Vit D, Mg, BMP (as the patient had some elevation in Cr in recent hospitalization) and some concerns for applying Voltaren gel on the trunk. Mg to be checked for possible addition of Mg as a muscle relaxant   5. Will R/O osteoporosis contribution to low back pain; will check Dexa scan of the bone and the hip  6. For the wrist paraesthesias, will monitor for now and consider EMG if symptoms persist.      Ms. De Santiago received counseling on the following healthy behaviors: medical compliance, smoking cessation, blood pressure control, regular follow up with primary doctor.         Electronically signed by Jacquie Mtz MD on 7/6/2022 at 12:59 PM

## 2022-07-07 ENCOUNTER — CARE COORDINATION (OUTPATIENT)
Dept: OTHER | Facility: CLINIC | Age: 62
End: 2022-07-07

## 2022-07-07 NOTE — CARE COORDINATION
Care Transitions Outreach Attempt    Call within 2 business days of discharge: Yes   Attempted to reach patient for transitions of care follow up. Unable to reach patient. Patient: Reba Cotton Patient : 1960 MRN: [id number]    Last Discharge [facility]       Complaint Diagnosis Description Type Department Provider    22 Back Pain Sciatica of left side . .. ED to Hosp-Admission (Discharged) (ADMITTED) Gurvinder Spears MD; Lenny Swartz. .. Was this an external facility discharge? Yes, [id number] Discharge Facility: [facility]    Noted following upcoming appointments from discharge chart review:   [facility] follow up appointment(s):   Future Appointments   Date Time Provider Nicola Zaidi   2022  3:30 PM Denece Burow, [place] [facility] [place]   2022  4:30 PM Georganne Jaime, PTA [facility] [place]   2022  3:30 PM Denece Burow, PTA [facility] [place]   2022  5:00 PM Georganne Jaime, PTA [facility] [place]   2022  4:15 PM Denece Burow, PTA [facility] [place]   2022  5:00 PM Georganne Jaime, PTA [facility] [place]   2022  4:15 PM Denece Burow, PTA [facility] [place]   2022  5:00 PM Georganne Jaime, PTA [place]   2022  6:00 PM Faby Martines[address]   2022  8:30 AM Chantel Michel MD [facility]   2022  9:30 AM [address] [place] [address], APRN - CNP [place] [facility]   2022  2:00  [employer] Kenneth López MD Neuro Rob the listed home number, male who answered said I need to call Kavya's cell phone, he provided me the number [phone]. I attempted to call the google assistant asked what I was calling about, before I could answer it replied they cannot take your call at this time. Oniel mishra. Will forward note to  ACM following patient for care transitions .

## 2022-07-08 ENCOUNTER — TELEPHONE (OUTPATIENT)
Dept: FAMILY MEDICINE CLINIC | Age: 62
End: 2022-07-08

## 2022-07-11 ENCOUNTER — TELEPHONE (OUTPATIENT)
Dept: FAMILY MEDICINE CLINIC | Age: 62
End: 2022-07-11

## 2022-07-11 ENCOUNTER — HOSPITAL ENCOUNTER (OUTPATIENT)
Dept: WOMENS IMAGING | Age: 62
Discharge: HOME | End: 2022-07-13
Payer: COMMERCIAL

## 2022-07-11 DIAGNOSIS — G89.29 CHRONIC LEFT-SIDED LOW BACK PAIN WITH LEFT-SIDED SCIATICA: ICD-10-CM

## 2022-07-11 DIAGNOSIS — M54.42 CHRONIC LEFT-SIDED LOW BACK PAIN WITH LEFT-SIDED SCIATICA: ICD-10-CM

## 2022-07-11 PROCEDURE — 77080 DXA BONE DENSITY AXIAL: CPT

## 2022-07-11 NOTE — TELEPHONE ENCOUNTER
Patient called office stating that the Chlorthalidone she was once taken was stopped, and she has a complaint of feet swelling. Patient was scheduled for an appointment on 07/15/22 to discuss medications.

## 2022-07-12 ENCOUNTER — TELEPHONE (OUTPATIENT)
Dept: FAMILY MEDICINE CLINIC | Age: 62
End: 2022-07-12

## 2022-07-12 ENCOUNTER — HOSPITAL ENCOUNTER (OUTPATIENT)
Dept: PHYSICAL THERAPY | Age: 62
Setting detail: THERAPIES SERIES
Discharge: HOME | End: 2022-07-12
Payer: COMMERCIAL

## 2022-07-12 DIAGNOSIS — E11.9 TYPE 2 DIABETES MELLITUS WITHOUT COMPLICATION, WITHOUT LONG-TERM CURRENT USE OF INSULIN (HCC): ICD-10-CM

## 2022-07-12 PROCEDURE — 97113 AQUATIC THERAPY/EXERCISES: CPT

## 2022-07-12 RX ORDER — BLOOD SUGAR DIAGNOSTIC
STRIP MISCELLANEOUS
Qty: 100 EACH | Refills: 3 | Status: SHIPPED | OUTPATIENT
Start: 2022-07-12

## 2022-07-12 NOTE — FLOWSHEET NOTE
[facility]   [address] [facility] [address]   Phone: [phone]   Fax: [phone]    Physical Therapy Daily Treatment Note      Date:  2022  Patient Name:  Ayan Austin    :  1960  MRN: [id number]  Physician: Sumeet Bahena MD (resident physician)  Jason Osman MD (attending physician)                                  Insurance: Duffy Kanner. Auth required after 30 visits  Medical Diagnosis: M54.16 (ICD-10-CM) - Lumbar radiculopathy              Rehab Codes: M54.16  Onset Date: 2021 with recent exacerbation 2022                    Next 's appt: 7/15/22; NP at neurosurgeon 22   Visit# / total visits:  Cancels/No Shows: 2/0    Subjective:  Patient reports since most recent hospitalization she feels as though she had a stroke: reports she has trouble finding words, difficulty writing with right hand, can not type with left hand and notable memory issues. Also feels she has left sided weakness with both U/LE's. Saw neurology last week and was referred to neurosurgery. Has appt with NP at neurosurgeon's office 22. To see PCP 7/15/22. Reports she has not been able to drive- relies on  or daughter. Concerned with RTW 8/10/22. Had a bone scan last night to check for osteoporosis. Pain:  [x] Yes  [] No Location:(L) Lower back into buttock, posterior/lateral thigh to knee; numbness in lower leg to ankle Pain Rating: (0-10 scale) 8/10  Pain altered Tx:  [x] No  [] Yes  Action:  Comments: Resumed aquatic therapy with emphasis on postural awareness and core stability; patient encouraged to avoid increasing pain    Objective:  Modalities:   Precautions:  Exercises:    [facility] J Exercise Log  Aquatic, Hip & DLS Program- Phase 1    Date of Eval:                                Primary PT: Yue Ruggiero PT  Diagnosis:   Things to Focus On (goals):   Surgical Precautions:  Medical Precautions:  [] C-9 dates  [] Occ Med   [] Medicare       Date 7/12/22     Visit # 11/26     Walk F/L/R 2 Laps @ Rail +R    Marching 10x     Squats 10x5\"     Step-Ups F/L      Step Down F/L      SLR F/L/R 10x     Hip/Knee Flex/Ext  Add    F/L Lunges            Kickboard Ex. Small     Iso Abd.  8x5\"     Push-pull 10x     Paddling            UE Format:      Horiz Abd/Add      IR/ER (wipers)      Alt Flex/Ext      Alt Press Down      Abd/Add            Deep Water: 1 Noodle     Hang 5'     Cycling 2'     Jacks      X-Country            Balance      SLS            Stretches      Achllies      Hamstring      Piriformis            Cool Down      Pain Rating 8         Specific Instructions for next treatment: Assess response to resuming aquatic therapy and progress as able; add retro ambulation and hip/knee flex/ext     Assessment: [x] Progressing toward goals. Patient guarded with movement and cautious to increase pain. Completed exercise with emphasis on technique and limited ROM. Patient defers heel toe raises due to previous ankle injury. Finished aquatic treatment with unloading in deep water with relief of symptoms noted- 7/10 with less intense radicular pain into left LE. Patient slow progressing with therapy but able to complete program    [] No change. [] Other:  Skilled PT intervention is required to help centralize/modulate sx, improve pain free lumbar AROM and maximize neuromuscular strength & stability necessary for unrestricted ADL/ work tolerances at her prior level of function. STG: (to be met in 18 treatments)  1. Pt will self report worst pain no greater than 3/10 in order to better tolerate ADLs/work activities with minimal dysfunction SETBACK FROM RECENT HOSPITALIZATION   2. Pt will improve lumbar AROM to 90% or greater in all planes in order to demonstrate ability to move/reach in all planes unrestricted at PLOF SETBACK FROM RECENT HOSPITALIZATION  3.  Pt will improve trunk strength to 4/5 or greater in all planes to show improved stability at prior level SETBACK FROM RECENT HOSPITALIZATION   4. Pt will self report reduced peripheral L LE pain (no distal than L knee) to demonstrate initial  favorable centralization response to therapy SETBACK FROM RECENT HOSPITALIZATION   LTG: (to be met in 26 treatments)  1. Pt will demonstrate improved B LE strength to 4/5 or greater in order to demonstrate improved stability/strength necessary for unrestricted ADLs/work activities SETBACK FROM RECENT HOSPITALIZATION  2. Pt will self report ability to complete all work duties with pain no greater than 1/10 to demonstrate unrestricted functional tolerances at prior level SETBACK FROM RECENT HOSPITALIZATION  3. Pt will improve core strength Sahrmann Grade 2/5 or greater to demonstrate better support and stability to lumbar spine SETBACK FROM RECENT HOSPITALIZATION   4. Pt will decrease SHEY to 10% impaired or less in order to demonstrate improved functional tolerances at PLOF with minimal restriction/dysfunction SETBACK FROM RECENT HOSPITALIZATION   5. Pt will demonstrate independence with a long term HEP for continued progress/maintenance after completion of PT SETBACK FROM RECENT HOSPITALIZATION       Patient Goals/Rehab Expectations: To alleviate pain and get back to normal    Pt. Education:  [x] Yes  [] No  [] Reviewed Prior HEP/Ed  Method of Education: [x] Verbal  [x] Demo  [] Written  Comprehension of Education: Benefits of aquatics and postural awareness  [x] Verbalizes understanding. [x] Demonstrates understanding. [] Needs review. [] Demonstrates/verbalizes HEP/Ed previously given. Plan: [x] Continue per plan of care.    [] Other:       Treatment Charges: Mins Units   []  Modalities     []  Ther Exercise     []  Manual Therapy     []  Ther Activities     [x]  Aquatics 27 2   []  Neuromuscular     [] Vasocompression     [] Gait Training     [] Dry needling        [] 1 or 2 muscles        [] 3 or more muscles     []  Other     Total Treatment time 27 2     Time In: 330 PM            Time Out: 403 PM      Electronically signed by:  Blima Crigler, PTA

## 2022-07-14 ENCOUNTER — HOSPITAL ENCOUNTER (OUTPATIENT)
Dept: PHYSICAL THERAPY | Age: 62
Setting detail: THERAPIES SERIES
Discharge: HOME | End: 2022-07-14
Payer: COMMERCIAL

## 2022-07-14 PROCEDURE — 97113 AQUATIC THERAPY/EXERCISES: CPT

## 2022-07-14 NOTE — FLOWSHEET NOTE
[address] [facility] Outpatient Physical Therapy   [facility] [facility] [address]   Phone: [phone]   Fax: [phone]    Physical Therapy Daily Treatment Note      Date:  2022  Patient Name:  Margarita Branham    :  1960  MRN: [id number]  Physician: Indigo Valdes MD (resident physician)  Aaron Stack MD (attending physician)                                  Insurance: [id number]. Auth required after 30 visits  Medical Diagnosis: M54.16 (ICD-10-CM) - Lumbar radiculopathy              Rehab Codes: M54.16  Onset Date: 2021 with recent exacerbation 2022                    Next 's appt: 7/15/22; NP at neurosurgeon 22   Visit# / total visits:  Cancels/No Shows: 2/0    Subjective:    : Patient arrived noting symptoms remain unchanged. Patient reports since her hospitalization she finds it difficult to speak feeling like she \"spaces out\" and can't find the right words and it takes some time to process/think then she recalls the words. Pain:  [x] Yes  [] No Location:(L) Lower back into buttock, posterior/lateral thigh to knee; numbness in lower leg to ankle Pain Rating: (0-10 scale) 8/10  Pain altered Tx:  [x] No  [] Yes  Action:  Comments: Resumed aquatic therapy with emphasis on postural awareness and core stability; patient encouraged to avoid increasing pain    Objective:  Modalities:   Precautions:  Exercises:    [facility] Exercise Log  Aquatic, Hip & DLS Program- Phase 1    Date of Eval:                                Primary PT: Scar Rod, PT  Diagnosis:   Things to Focus On (goals):   Surgical Precautions:  Medical Precautions:  [] C-9 dates  [] Occ Med   [] Medicare       Date 22    Visit #     Walk F/L/R 2 Laps @ Rail 2 laps @ rail  1 lap retro    Marching 10x 10x    Squats 10x5\" 10x5\"    Step-Ups F/L      Step Down F/L      SLR F/L/R 10x 10x    Hip/Knee Flex/Ext  10x    F/L Lunges Kickboard Ex. Small Small     Iso Abd.  8x5\" 10x5\"    Push-pull 10x 10x    Paddling            UE Format:      Horiz Abd/Add      IR/ER (wipers)      Alt Flex/Ext      Alt Press Down      Abd/Add            Deep Water: 1 Noodle 1 noodle     Hang 5' 5'    Cycling 2' 2'    Jacks      X-Country            Balance      SLS            Stretches      Achllies      Hamstring      Piriformis            Cool Down      Pain Rating 8 8        Specific Instructions for next treatment: Assess response to resuming aquatic therapy and progress as able; add retro ambulation and hip/knee flex/ext     Assessment: [x] Progressing toward goals. 7/14: Patient guarded with movement and cautious to increase pain. Completed exercise with  limited ROM. Patient unable to complete more than 1 lap for retro walking due to increase pain symptoms. Patient continues to defer heel/toe raises because of fear of increased pain symptoms. Patient was able to tolerate hip flexion/knee extension exercise with minimum range. [] No change. [] Other:  Skilled PT intervention is required to help centralize/modulate sx, improve pain free lumbar AROM and maximize neuromuscular strength & stability necessary for unrestricted ADL/ work tolerances at her prior level of function. STG: (to be met in 18 treatments)  1. Pt will self report worst pain no greater than 3/10 in order to better tolerate ADLs/work activities with minimal dysfunction SETBACK FROM RECENT HOSPITALIZATION   2. Pt will improve lumbar AROM to 90% or greater in all planes in order to demonstrate ability to move/reach in all planes unrestricted at PLOF SETBACK FROM RECENT HOSPITALIZATION  3. Pt will improve trunk strength to 4/5 or greater in all planes to show improved stability at prior level SETBACK FROM RECENT HOSPITALIZATION   4.  Pt will self report reduced peripheral L LE pain (no distal than L knee) to demonstrate initial  favorable centralization response to therapy SETBACK FROM RECENT HOSPITALIZATION   LTG: (to be met in 26 treatments)  1. Pt will demonstrate improved B LE strength to 4/5 or greater in order to demonstrate improved stability/strength necessary for unrestricted ADLs/work activities SETBACK FROM RECENT HOSPITALIZATION  2. Pt will self report ability to complete all work duties with pain no greater than 1/10 to demonstrate unrestricted functional tolerances at prior level SETBACK FROM RECENT HOSPITALIZATION  3. Pt will improve core strength Sahrmann Grade 2/5 or greater to demonstrate better support and stability to lumbar spine SETBACK FROM RECENT HOSPITALIZATION   4. Pt will decrease SHEY to 10% impaired or less in order to demonstrate improved functional tolerances at PLOF with minimal restriction/dysfunction SETBACK FROM RECENT HOSPITALIZATION   5. Pt will demonstrate independence with a long term HEP for continued progress/maintenance after completion of PT SETBACK FROM RECENT HOSPITALIZATION       Patient Goals/Rehab Expectations: To alleviate pain and get back to normal    Pt. Education:  [x] Yes  [] No  [] Reviewed Prior HEP/Ed  Method of Education: [x] Verbal  [x] Demo  [] Written  Comprehension of Education: Benefits of aquatics and postural awareness  [x] Verbalizes understanding. [x] Demonstrates understanding. [] Needs review. [] Demonstrates/verbalizes HEP/Ed previously given. Plan: [x] Continue per plan of care.    [] Other:       Treatment Charges: Mins Units   []  Modalities     []  Ther Exercise     []  Manual Therapy     []  Ther Activities     [x]  Aquatics 29 2   []  Neuromuscular     [] Vasocompression     [] Gait Training     [] Dry needling        [] 1 or 2 muscles        [] 3 or more muscles     []  Other     Total Treatment time 29 2     Time In: 587 PM            Time Out: 458 PM      Electronically signed by:  Benton Ponce PTA

## 2022-07-15 ENCOUNTER — OFFICE VISIT (OUTPATIENT)
Dept: FAMILY MEDICINE CLINIC | Age: 62
End: 2022-07-15
Payer: COMMERCIAL

## 2022-07-15 VITALS — TEMPERATURE: 92.8 F | HEIGHT: 62 IN | BODY MASS INDEX: 35.88 KG/M2 | WEIGHT: 195 LBS

## 2022-07-15 DIAGNOSIS — G25.81 RESTLESS LEG SYNDROME: ICD-10-CM

## 2022-07-15 DIAGNOSIS — M54.42 CHRONIC MIDLINE LOW BACK PAIN WITH LEFT-SIDED SCIATICA: ICD-10-CM

## 2022-07-15 DIAGNOSIS — F33.9 RECURRENT MAJOR DEPRESSIVE DISORDER, REMISSION STATUS UNSPECIFIED (HCC): ICD-10-CM

## 2022-07-15 DIAGNOSIS — L30.9 DERMATITIS: ICD-10-CM

## 2022-07-15 DIAGNOSIS — G56.01 CARPAL TUNNEL SYNDROME OF RIGHT WRIST: Primary | ICD-10-CM

## 2022-07-15 DIAGNOSIS — R60.0 LEG EDEMA: ICD-10-CM

## 2022-07-15 DIAGNOSIS — G89.29 CHRONIC MIDLINE LOW BACK PAIN WITH LEFT-SIDED SCIATICA: ICD-10-CM

## 2022-07-15 PROCEDURE — 99214 OFFICE O/P EST MOD 30 MIN: CPT | Performed by: STUDENT IN AN ORGANIZED HEALTH CARE EDUCATION/TRAINING PROGRAM

## 2022-07-15 RX ORDER — ATORVASTATIN CALCIUM 40 MG/1
40 TABLET, FILM COATED ORAL NIGHTLY
Qty: 90 TABLET | Refills: 2 | Status: SHIPPED | OUTPATIENT
Start: 2022-07-15

## 2022-07-15 RX ORDER — ATORVASTATIN CALCIUM 40 MG/1
TABLET, FILM COATED ORAL
COMMUNITY
Start: 2022-06-10 | End: 2022-07-15 | Stop reason: SDUPTHER

## 2022-07-15 RX ORDER — BLOOD-GLUCOSE METER
1 KIT MISCELLANEOUS DAILY
Qty: 1 KIT | Refills: 0 | OUTPATIENT
Start: 2022-07-15

## 2022-07-15 RX ORDER — OXYCODONE HYDROCHLORIDE AND ACETAMINOPHEN 5; 325 MG/1; MG/1
TABLET ORAL EVERY 4 HOURS PRN
COMMUNITY

## 2022-07-15 RX ORDER — LANCETS 30 GAUGE
1 EACH MISCELLANEOUS DAILY
Qty: 100 EACH | Refills: 11 | OUTPATIENT
Start: 2022-07-15

## 2022-07-15 RX ORDER — SENNOSIDES 8.6 MG
650 CAPSULE ORAL EVERY 8 HOURS PRN
Qty: 60 TABLET | Refills: 3 | Status: SHIPPED | OUTPATIENT
Start: 2022-07-15 | End: 2022-10-11 | Stop reason: ALTCHOICE

## 2022-07-15 RX ORDER — ROPINIROLE 1 MG/1
2 TABLET, FILM COATED ORAL NIGHTLY
Qty: 90 TABLET | Refills: 3 | Status: SHIPPED | OUTPATIENT
Start: 2022-07-15 | End: 2022-10-11 | Stop reason: ALTCHOICE

## 2022-07-15 RX ORDER — MICONAZOLE NITRATE 20.6 MG/G
43 POWDER TOPICAL PRN
Qty: 45 G | Refills: 1 | Status: SHIPPED | OUTPATIENT
Start: 2022-07-15 | End: 2022-10-11 | Stop reason: ALTCHOICE

## 2022-07-15 RX ORDER — FUROSEMIDE 20 MG/1
10 TABLET ORAL EVERY OTHER DAY
Qty: 60 TABLET | Refills: 3 | Status: SHIPPED | OUTPATIENT
Start: 2022-07-15

## 2022-07-15 ASSESSMENT — ENCOUNTER SYMPTOMS
DIARRHEA: 0
SINUS PAIN: 0
COUGH: 0
WHEEZING: 0
BACK PAIN: 0
VOMITING: 0
NAUSEA: 0
CONSTIPATION: 0
SHORTNESS OF BREATH: 0
BLOOD IN STOOL: 0
COLOR CHANGE: 0
SINUS PRESSURE: 0
ABDOMINAL PAIN: 0

## 2022-07-15 NOTE — PROGRESS NOTES
Nol  Subjective:    Nancy Bradshaw is a 65 y.o. female with  has a past medical history of Cervical spondylosis, Generalized anxiety disorder, Hyperlipidemia, Hypertension, Lumbar radiculopathy, DIEGO (nonalcoholic steatohepatitis), Obesity, Osteoarthritis of left knee, Restless legs syndrome, and Type II or unspecified type diabetes mellitus without mention of complication, not stated as uncontrolled. Presented to the office today for:  Chief Complaint   Patient presents with    Hypertension     Patient is here for a follow up and want to discuss medications     Referral - General     For occupational therapy     Rash     Under the left breast     Arm Pain     Starts at the left  elbow and travels to the last three fingers     Back Pain       HPI  This is a 61-year-old female with history of chronic back pain, hyperlipidemia, restless leg syndrome came in today with a concern of numbness and tingling on the left upper extremity. 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 Patient said that she never had any direct trauma but of the symptoms are aggravating since the hospitalization. Review of Systems   Constitutional:  Negative for chills and fever. HENT:  Negative for congestion, sinus pressure and sinus pain. Respiratory:  Negative for cough, shortness of breath and wheezing. Cardiovascular:  Negative for chest pain, palpitations and leg swelling. Gastrointestinal:  Negative for abdominal pain, blood in stool, constipation, diarrhea, nausea and vomiting. Genitourinary:  Negative for difficulty urinating, flank pain and hematuria. Musculoskeletal:  Negative for arthralgias, back pain and myalgias. Skin:  Negative for color change and wound.    Neurological:  Negative for dizziness, light-headedness and headaches. Psychiatric/Behavioral:  Negative for agitation and confusion. ROS negative except what mentioned in HPI. The patient has a   Family History   Problem Relation Age of Onset    Heart Attack Mother     Diabetes Mother     Diabetes Father     Heart Attack Father        Objective:    Temp (!) 92.8 °F (33.8 °C) (Temporal)   Ht 5' 2.01\" (1.575 m)   Wt 195 lb (88.5 kg)   BMI 35.66 kg/m²    BP Readings from Last 3 Encounters:   07/06/22 136/85   06/29/22 118/76   06/24/22 122/86       Physical Exam  Vitals and nursing note reviewed. Constitutional:       Appearance: Normal appearance. HENT:      Mouth/Throat:      Mouth: Mucous membranes are moist.   Eyes:      Conjunctiva/sclera: Conjunctivae normal.   Cardiovascular:      Rate and Rhythm: Normal rate and regular rhythm. Pulmonary:      Effort: Pulmonary effort is normal.      Breath sounds: Normal breath sounds. Abdominal:      General: Bowel sounds are normal. There is no distension. Palpations: Abdomen is soft. There is no mass. Tenderness: There is no abdominal tenderness. There is no guarding. Musculoskeletal:      Right lower leg: No edema. Left lower leg: No edema. Comments: Phalen sign is negative, Tinel's sign is positive. Neurological:      General: No focal deficit present. Mental Status: She is alert and oriented to person, place, and time.    Psychiatric:         Mood and Affect: Mood normal.         Behavior: Behavior normal.       Lab Results   Component Value Date    WBC 10.9 06/13/2022    HGB 10.3 (L) 06/13/2022    HCT 33.7 (L) 06/13/2022     06/13/2022    CHOL 154 06/24/2021    TRIG 217 (H) 06/24/2021    HDL 59 06/24/2021    LDLDIRECT 156 (H) 05/20/2017    ALT 36 (H) 05/31/2022    AST 27 05/31/2022     07/06/2022    K 4.6 07/06/2022     07/06/2022    CREATININE 1.00 (H) 07/06/2022    BUN 15 07/06/2022    CO2 25 07/06/2022    TSH 1.37 07/06/2022    INR 1.0 07/23/2013 understands and agree to the plan. Teach back method was used while having the conversation. All patient questions answered. Pt voiced understanding. No follow-ups on file. Disclaimer: Some oral of this note was transcribed using voice-recognition software. This may cause typographical errors occasionally, please review it with the context of the note. Although all effort is made to fix these errors, please do not hesitate to contact our office if there Nicolasa Blunt concern with the understanding of this note.

## 2022-07-15 NOTE — TELEPHONE ENCOUNTER
Pharmacy contacting office because the can not bill patients insurance unless they receive separate scripts for the pending medications. Please sign pending orders.

## 2022-07-15 NOTE — PROGRESS NOTES
Attending Physician Statement  I have discussed the care off. First name Rony pertinent history and exam findings,  with the resident. I have seen and examined the patient and the key elements of all parts of the encounter have been performed by me. I agree with the assessment, plan and orders as documented by the resident. (GC Modifier)    FU from Hospital  1. LCTS- Splint  2. HTN- Controlled  3. Lumbar radiculopathy-Gabapentin per Neuro

## 2022-07-15 NOTE — PATIENT INSTRUCTIONS
Thank you for letting us take care of you today. We hope all your questions were addressed. If a question was overlooked or something else comes to mind after you return home, please contact a member of your Care Team listed below. Your Care Team at [place] [address] is Team #5  Telma Coe MD (Faculty)  Santiago Amaro MD (Resident)  Ras Callahan MD (Resident)  Juanita Millan MD (Resident)  Chago Quintanilla MD, (Resident)  Tori Tirado., [facility]  Darius Oliveira., A  Hay Duque.,  N  [facility] Marian Hooker., Radha Liz [facility] office)  Darian Porter, [address] [facility] [address] (Clinical Practice Manager)  Erika Clark [facility] (Clinical Pharmacist)       Office phone number: [phone]    If you need to get in right away due to illness, please be advised we have \"Same Day\" appointments available Monday-Friday. Please call us at [phone] option #3 to schedule your \"Same Day\" appointment.

## 2022-07-18 RX ORDER — BUSPIRONE HYDROCHLORIDE 10 MG/1
TABLET ORAL
Qty: 180 TABLET | Refills: 0 | Status: SHIPPED | OUTPATIENT
Start: 2022-07-18 | End: 2022-08-23

## 2022-07-18 NOTE — TELEPHONE ENCOUNTER
E-scribe request for med refill. Please review and e-scribe if applicable. Last Visit Date:  07/15/2022  Next Visit Date:  8/18/2022    Hemoglobin A1C (%)   Date Value   05/26/2022 7.9   09/14/2021 7.7   06/24/2021 7.9 (H)             ( goal A1C is < 7)   Microalb/Crt.  Ratio (mcg/mg creat)   Date Value   06/11/2021 CANNOT BE CALCULATED     LDL Cholesterol (mg/dL)   Date Value   06/24/2021 52       (goal LDL is <100)   AST (U/L)   Date Value   05/31/2022 27     ALT (U/L)   Date Value   05/31/2022 36 (H)     BUN (mg/dL)   Date Value   07/06/2022 15     BP Readings from Last 3 Encounters:   07/06/22 136/85   06/29/22 118/76   06/24/22 122/86          (goal 120/80)        Patient Active Problem List:     Cervical spondylosis     Type II or unspecified type diabetes mellitus without mention of complication, not stated as uncontrolled     Hypertension     Abnormal auditory perception     Eustachian tube dysfunction     Chronic serous otitis media     Nasal polyps     Nasal congestion     Osteoarthritis of left knee     Osteoarthritis of right knee     DIEGO (nonalcoholic steatohepatitis)     Vitamin D deficiency     Iron deficiency anemia     Dyslipidemia     Diabetes mellitus type 2, uncontrolled (HCC)     Left hip pain     Trochanteric bursitis     Fatigue     Daytime somnolence     Snoring     Chronic left shoulder pain     Restless legs syndrome     Generalized anxiety disorder     Primary osteoarthritis, left shoulder     Tendinopathy of left shoulder     Adhesive capsulitis of left shoulder     Lumbar radiculopathy     Back pain     Toxic metabolic encephalopathy     Class 2 obesity in adult     Ulnar neuropathy of both upper extremities     Leg edema     Recurrent major depressive disorder ([address]     Dermatitis      ----Evia Hams

## 2022-07-19 ENCOUNTER — CARE COORDINATION (OUTPATIENT)
Dept: OTHER | Facility: CLINIC | Age: 62
End: 2022-07-19

## 2022-07-19 ENCOUNTER — HOSPITAL ENCOUNTER (OUTPATIENT)
Dept: PHYSICAL THERAPY | Age: 62
Setting detail: THERAPIES SERIES
Discharge: HOME | End: 2022-07-19
Payer: COMMERCIAL

## 2022-07-19 PROCEDURE — 97113 AQUATIC THERAPY/EXERCISES: CPT

## 2022-07-19 NOTE — CARE COORDINATION
Care Transitions Outreach Attempt    Call within 2 business days of discharge: No   Attempted to reach patient for transitions of care follow up. Unable to reach patient. Patient: Agnes Cuevas Patient : 1960 MRN: [id number]    Last Discharge [address] [place] [address]       Date Complaint Diagnosis Description Type Department Provider    22 Back Pain Sciatica of left side . .. ED to Hosp-Admission (Discharged) (ADMITTED) Bill Hebert MD; Agus Drake. .. Was this an external facility discharge? Yes, 22  Discharge Facility: [facility]    Noted following upcoming appointments from discharge chart review:   [facility] follow up appointment(s):   Future Appointments   Date Time Provider Nicola Zaidi   2022  3:30 PM Ana Laura Constantino, PTA [facility]   2022  5:00 PM Octavio Kicks, PTA [facility] 1 [facility]   2022  4:15 PM Ana Laura Constantino, PTA [facility] [place]   2022  5:00 PM Octavio Kicks, PTA [facility] [place]   2022  4:15 PM Ana Laura Constantino, PTA [facility] 1 [facility]   2022  5:00 PM Octavio Kicks, PTA [place]   2022  6:00 PM Caren Edgar, [address] [place] [address] 71   2022  8:30 AM [facility]   2022  9:30 AM [address] [place] [address]   2022  2:00 PM Senait Harrell MD Neuro [place] [address]     Non-[facility] follow up appointment(s): This is the second attempt. Lost to follow up letter sent via [employer].

## 2022-07-19 NOTE — FLOWSHEET NOTE
[address] [facility] Outpatient Physical Therapy   [address] [facility] [address]   Phone: [phone]   Fax: [phone]    Physical Therapy Daily Treatment Note      Date:  2022  Patient Name:  Arcelia Salcedo    :  1960  MRN: [id number]  Physician: Tayler Carrillo MD (resident physician)  Anup Durham MD (attending physician)                                  Insurance: [facility] [employer]. Auth required after 30 visits  Medical Diagnosis: M54.16 (ICD-10-CM) - Lumbar radiculopathy              Rehab Codes: M54.16  Onset Date: 2021 with recent exacerbation 2022                    Next 's appt: 7/15/22; NP at neurosurgeon 22   Visit# / total visits:  Cancels/No Shows: 2/0    Subjective:  Feels a little better overall- states she had 2 good days after last visit and thought she could stand and do dishes as part of therapy for her back and UE's- states she was exhausted; states pain was down to 4-5/10. Continues with pain in lower back and into L LE- a little less intense- still relies on cane to get around long distances. (B) UE's/hands remain N/T    Pain:  [x] Yes  [] No Location:(L) Lower back into buttock, (L) posterior/lateral thigh to knee; numbness in lower leg to ankle Pain Rating: (0-10 scale) 5-6/10  Pain altered Tx:  [x] No  [] Yes  Action:  Comments:     Objective:  Modalities:   Precautions:  Exercises:    [address] [facility] Exercise Log  Aquatic, Hip & DLS Program- Phase 1    Date of Eval:                                Primary PT: Charo An, PT  Diagnosis:   Things to Focus On (goals):   Surgical Precautions:  Medical Precautions:  [] C-9 dates  [] Occ Med   [] Medicare       Date 22    Visit #     Walk F/L/R 2 Laps @ Rail 2 laps @ rail  1 lap retro 2 Laps @ Rail    Marching 10x 10x 12x    Squats 10x5\" 10x5\" 12x5\"    Step-Ups F/L       Step Down F/L       SLR F/L/R 10x 10x 12x    Hip/Knee Flex/Ext  10x 12x    F/L Lunges              Kickboard Ex. Small Small  Small    Iso Abd.  8x5\" 10x5\" 12x5\"    Push-pull 10x 10x 12x    Paddling              UE Format:    Add   Horiz Abd/Add       IR/ER (wipers)       Alt Flex/Ext       Alt Press Down       Abd/Add              Deep Water: 1 Noodle 1 noodle  1 Noodle    Hang 5' 5' 5'    Cycling 2' 2' 2'    Jacks   1'    X-Country   1'           Balance       SLS              Stretches       Achllies       Hamstring   3x30\"    Piriformis              Cool Down       Pain Rating 8 8 5-6        Specific Instructions for next treatment: Progress with UE format to challenge core stability. Assessment: [x] Progressing toward goals. Increased reps to tolerance with LE exercise- emphasis on posture and activation of core muscles. Added hamstring stretches to assist with flexibility- some soreness/tightness noted (B). Also progressed with deep water aerobics as tolerated. Finished with deep water decompression to assist with pain. [] No change. [] Other:    Skilled PT intervention is required to help centralize/modulate sx, improve pain free lumbar AROM and maximize neuromuscular strength & stability necessary for unrestricted ADL/ work tolerances at her prior level of function.      STG: (to be met in 18 treatments)  Pt will self report worst pain no greater than 3/10 in order to better tolerate ADLs/work activities with minimal dysfunction SETBACK FROM RECENT HOSPITALIZATION   Pt will improve lumbar AROM to 90% or greater in all planes in order to demonstrate ability to move/reach in all planes unrestricted at PLOF SETBACK FROM RECENT HOSPITALIZATION  Pt will improve trunk strength to 4/5 or greater in all planes to show improved stability at prior level SETBACK FROM RECENT HOSPITALIZATION   Pt will self report reduced peripheral L LE pain (no distal than L knee) to demonstrate initial  favorable centralization response to therapy SETBACK FROM RECENT HOSPITALIZATION   LTG: (to be met in 26 treatments)  Pt will demonstrate improved B LE strength to 4/5 or greater in order to demonstrate improved stability/strength necessary for unrestricted ADLs/work activities SETBACK FROM RECENT HOSPITALIZATION  Pt will self report ability to complete all work duties with pain no greater than 1/10 to demonstrate unrestricted functional tolerances at prior level SETBACK FROM RECENT HOSPITALIZATION  Pt will improve core strength Sahrmann Grade 2/5 or greater to demonstrate better support and stability to lumbar spine SETBACK FROM RECENT HOSPITALIZATION   Pt will decrease SHEY to 10% impaired or less in order to demonstrate improved functional tolerances at PLOF with minimal restriction/dysfunction SETBACK FROM RECENT HOSPITALIZATION   Pt will demonstrate independence with a long term HEP for continued progress/maintenance after completion of PT SETBACK FROM RECENT HOSPITALIZATION       Patient Goals/Rehab Expectations: To alleviate pain and get back to normal    Pt. Education:  [] Yes  [] No  [x] Reviewed Prior HEP/Ed  Method of Education: [x] Verbal  [x] Demo  [] Written  Comprehension of Education:   [x] Verbalizes understanding. [x] Demonstrates understanding. [] Needs review. [] Demonstrates/verbalizes HEP/Ed previously given. Plan: [x] Continue per plan of care.    [] Other:       Treatment Charges: Mins Units   []  Modalities     []  Ther Exercise     []  Manual Therapy     []  Ther Activities     [x]  Aquatics 34 2   []  Neuromuscular     [] Vasocompression     [] Gait Training     [] Dry needling        [] 1 or 2 muscles        [] 3 or more muscles     []  Other     Total Treatment time 34 2     Time In: 340 PM            Time Out: 419 PM      Electronically signed by:  Nathaniel Patel PTA

## 2022-07-21 ENCOUNTER — HOSPITAL ENCOUNTER (OUTPATIENT)
Dept: PHYSICAL THERAPY | Age: 62
Setting detail: THERAPIES SERIES
Discharge: HOME | End: 2022-07-21
Payer: COMMERCIAL

## 2022-07-21 PROCEDURE — 97113 AQUATIC THERAPY/EXERCISES: CPT

## 2022-07-21 NOTE — FLOWSHEET NOTE
Kickboard Ex. Small Small  Small Small   Iso Abd.  8x5\" 10x5\" 12x5\" 12x5\"   Push-pull 10x 10x 12x 12x   Paddling              UE Format:    Chest deep   Horiz Abd/Add    10x   IR/ER (wipers)    10x   Alt Flex/Ext    10x   Alt Press Down    10x   Abd/Add    10x          Deep Water: 1 Noodle 1 noodle  1 Noodle 1 Noodle   Hang 5' 5' 5' 5'   Cycling 2' 2' 2' 2'   Jacks   1' 2'   X-Country   1' 2'          Balance       SLS              Stretches       Achllies       Hamstring   3x30\" 3x30\"   Piriformis              Cool Down       Pain Rating 8 8 5-6 5-6       Specific Instructions for next treatment: Progress with UE format to challenge core stability. Assessment: [x] Progressing toward goals. 7/21: emphasis on postural awareness and core stability. Able to add UE format to continue to challenge dorsal/core stability and strengthening. Increased time for deep water aerobics with good tolerance. Patient demonstrates quicker pace with exercise with less guarded movement today. [] No change. [] Other:    Skilled PT intervention is required to help centralize/modulate sx, improve pain free lumbar AROM and maximize neuromuscular strength & stability necessary for unrestricted ADL/ work tolerances at her prior level of function.      STG: (to be met in 18 treatments)  Pt will self report worst pain no greater than 3/10 in order to better tolerate ADLs/work activities with minimal dysfunction SETBACK FROM RECENT HOSPITALIZATION   Pt will improve lumbar AROM to 90% or greater in all planes in order to demonstrate ability to move/reach in all planes unrestricted at PLOF SETBACK FROM RECENT HOSPITALIZATION  Pt will improve trunk strength to 4/5 or greater in all planes to show improved stability at prior level SETBACK FROM RECENT HOSPITALIZATION   Pt will self report reduced peripheral L LE pain (no distal than L knee) to demonstrate initial  favorable centralization response to therapy SETBACK FROM RECENT HOSPITALIZATION   LTG: (to be met in 26 treatments)  Pt will demonstrate improved B LE strength to 4/5 or greater in order to demonstrate improved stability/strength necessary for unrestricted ADLs/work activities SETBACK FROM RECENT HOSPITALIZATION  Pt will self report ability to complete all work duties with pain no greater than 1/10 to demonstrate unrestricted functional tolerances at prior level SETBACK FROM RECENT HOSPITALIZATION  Pt will improve core strength Sahrmann Grade 2/5 or greater to demonstrate better support and stability to lumbar spine SETBACK FROM RECENT HOSPITALIZATION   Pt will decrease SHEY to 10% impaired or less in order to demonstrate improved functional tolerances at PLOF with minimal restriction/dysfunction SETBACK FROM RECENT HOSPITALIZATION   Pt will demonstrate independence with a long term HEP for continued progress/maintenance after completion of PT SETBACK FROM RECENT HOSPITALIZATION       Patient Goals/Rehab Expectations: To alleviate pain and get back to normal    Pt. Education:  [] Yes  [] No  [x] Reviewed Prior HEP/Ed  Method of Education: [x] Verbal  [x] Demo  [] Written  Comprehension of Education:   [x] Verbalizes understanding. [x] Demonstrates understanding. [] Needs review. [] Demonstrates/verbalizes HEP/Ed previously given. Plan: [x] Continue per plan of care.    [] Other:       Treatment Charges: Mins Units   []  Modalities     []  Ther Exercise     []  Manual Therapy     []  Ther Activities     [x]  Aquatics 39 3   []  Neuromuscular     [] Vasocompression     [] Gait Training     [] Dry needling        [] 1 or 2 muscles        [] 3 or more muscles     []  Other     Total Treatment time 39 3     Time In: 502 PM            Time Out:  961 PM      Electronically signed by:  Shanti Nieves PTA

## 2022-07-26 ENCOUNTER — CARE COORDINATION (OUTPATIENT)
Dept: OTHER | Facility: CLINIC | Age: 62
End: 2022-07-26

## 2022-07-26 ENCOUNTER — HOSPITAL ENCOUNTER (OUTPATIENT)
Dept: PHYSICAL THERAPY | Age: 62
Setting detail: THERAPIES SERIES
Discharge: HOME | End: 2022-07-26
Payer: COMMERCIAL

## 2022-07-26 PROCEDURE — 97113 AQUATIC THERAPY/EXERCISES: CPT

## 2022-07-26 NOTE — FLOWSHEET NOTE
[facility] Outpatient Physical Therapy   [address]   Phone: [phone]   Fax: [phone]    Physical Therapy Daily Treatment Note      Date:  2022  Patient Name:  Tammy White    :  1960  MRN: [id number]  Physician: Ryan Dubose MD (resident physician)  Vignesh Davis MD (attending physician)                                  Insurance: Susana Salt. Auth required after 30 visits  Medical Diagnosis: M54.16 (ICD-10-CM) - Lumbar radiculopathy              Rehab Codes: M54.16  Onset Date: 2021 with recent exacerbation 2022                    Next 's appt: 7/15/22; NP at neurosurgeon 22   Visit# / total visits: 15/26 Cancels/No Shows: 2/0    Subjective:  Reports numbness in left lower leg remains unchanged; denies R LE symptoms. Overall feeling a little better as far as pain intensity; denies issue after last visit      Pain:  [x] Yes  [] No Location:(L) Lower back into buttock, (L) posterior/lateral thigh to knee; numbness in lower leg to ankle Pain Rating: (0-10 scale) 4/10  Pain altered Tx:  [x] No  [] Yes  Action:  Comments:     Objective:  Modalities:   Precautions:  Exercises:    [address] [facility] Exercise Log  Aquatic, Hip & DLS Program- Phase 1    Date of Eval:                                Primary PT: Felicita Vega, PT  Diagnosis: Things to Focus On (goals):   Surgical Precautions:  Medical Precautions:  [] C-9 dates  [] Occ Med   [] Medicare       Date 22    Visit # 11/26 12/26 13/26 14/26 15/26    Walk F/L/R 2 Laps @ Rail 2 laps @ rail  1 lap retro 2 Laps @ Rail 2 Laps @ Rail 2 Laps @ Rail    Marching 10x 10x 12x 12x 2 Laps @ Hansel Gomez 10x5\" 10x5\" 12x5\" 12x5\" 15x5\"    Step-Ups F/L      Add   Step Down F/L         SLR F/L/R 10x 10x 12x 12x 15x    Hip/Knee Flex/Ext  10x 12x 12x 15x    F/L Lunges                  Kickboard Ex.  Small Small  Small Small Small Iso Abd.  8x5\" 10x5\" 12x5\" 12x5\" 15x5\"    Push-pull 10x 10x 12x 12x 15x    Paddling                  UE Format:    Chest deep Chest Deep    Horiz Abd/Add    10x 10x    IR/ER (wipers)    10x 10x    Alt Flex/Ext    10x 10x    Alt Press Down    10x 10x    Abd/Add    10x 10x             Deep Water: 1 Noodle 1 noodle  1 Noodle 1 Noodle 1 Noodle    Hang 5' 5' 5' 5' 5'    Cycling 2' 2' 2' 2' 2'    Jacks   1' 2' 2'    X-Country   1' 2' 2'             Balance         SLS                  Stretches         Achllies         Hamstring   3x30\" 3x30\" 3x30\"    Piriformis                  Breast Stroke     1 Lap    Pain Rating 8 8 5-6 5-6 4        Specific Instructions for next treatment: Add step ups    Assessment: [x] Progressing toward goals. Progressed with marching and breast stroke laps- patient continues to require be close/ near rail with walking program due to instability and fear of falling. Emphasis on posture and technique throughout program. Patient denies increased pain with activity. Finished with deep water unloading and aerobic activity. [] No change. [] Other:    Skilled PT intervention is required to help centralize/modulate sx, improve pain free lumbar AROM and maximize neuromuscular strength & stability necessary for unrestricted ADL/ work tolerances at her prior level of function.      STG: (to be met in 18 treatments)  Pt will self report worst pain no greater than 3/10 in order to better tolerate ADLs/work activities with minimal dysfunction SETBACK FROM RECENT HOSPITALIZATION   Pt will improve lumbar AROM to 90% or greater in all planes in order to demonstrate ability to move/reach in all planes unrestricted at PLOF SETBACK FROM RECENT HOSPITALIZATION  Pt will improve trunk strength to 4/5 or greater in all planes to show improved stability at prior level SETBACK FROM RECENT HOSPITALIZATION   Pt will self report reduced peripheral L LE pain (no distal than L knee) to demonstrate initial favorable centralization response to therapy SETBACK FROM RECENT HOSPITALIZATION   LTG: (to be met in 26 treatments)  Pt will demonstrate improved B LE strength to 4/5 or greater in order to demonstrate improved stability/strength necessary for unrestricted ADLs/work activities SETBACK FROM RECENT HOSPITALIZATION  Pt will self report ability to complete all work duties with pain no greater than 1/10 to demonstrate unrestricted functional tolerances at prior level SETBACK FROM RECENT HOSPITALIZATION  Pt will improve core strength Sahrmann Grade 2/5 or greater to demonstrate better support and stability to lumbar spine SETBACK FROM RECENT HOSPITALIZATION   Pt will decrease SHEY to 10% impaired or less in order to demonstrate improved functional tolerances at PLOF with minimal restriction/dysfunction SETBACK FROM RECENT HOSPITALIZATION   Pt will demonstrate independence with a long term HEP for continued progress/maintenance after completion of PT SETBACK FROM RECENT HOSPITALIZATION       Patient Goals/Rehab Expectations: To alleviate pain and get back to normal    Pt. Education:  [] Yes  [] No  [x] Reviewed Prior HEP/Ed  Method of Education: [x] Verbal  [x] Demo  [] Written  Comprehension of Education:   [x] Verbalizes understanding. [x] Demonstrates understanding. [] Needs review. [] Demonstrates/verbalizes HEP/Ed previously given. Plan: [x] Continue per plan of care.    [] Other:       Treatment Charges: Mins Units   []  Modalities     []  Ther Exercise     []  Manual Therapy     []  Ther Activities     [x]  Aquatics 50 3   []  Neuromuscular     [] Vasocompression     [] Gait Training     [] Dry needling        [] 1 or 2 muscles        [] 3 or more muscles     []  Other     Total Treatment time 50 3     Time In: 203 PM            Time Out:  258 PM      Electronically signed by:  Jayme Mariscal PTA

## 2022-07-26 NOTE — CARE COORDINATION
Ambulatory Care Coordination Note  2022    ACC: Michelle Edge RN    Summary Note: Ambulatory Care Manager [facility]) contacted the patient by telephone to follow up on progress, discuss new issues or concerns, and reinforce/ provide patient education. Verified name and  with patient as identifiers. Patient followed up with PCP on 2022. Patient states she was told she has ulnar neuropathy. If no improvement may need a EMG. Also was told to follow up with neurosurgery for increased back pain. Patient followed up with with PCP on 7/15/2022. Was told she has carpal tunnel in right wrist and given night splint. Patient states she is not using splint. Right wrist is improving. Still has numbness but not as bad. Left wrist is not improving as well and taking longer. Can make a fist now with left hand. She is doing exercises for ulnar neuropathy and strengthening exercises. Patient is attending aquatic physical therapy 2x week for back. Also doing arm strengthening exercises at aquatic therapy. States aquatic therapy is helping her back pain. She is weaning off of the walker and is using cane. Pateint follows up with neurosurgery on 2022. Will follow up with PCP in Sept.   Blood sugars are under 200. Yesterday it was 119. Reinforced/ Provided Education:  Discussed red flags and appropriate site of care based on symptoms and resources available to patient including: PCP  Specialist  Benefits related nurse triage line  Urgent care clinics  [facility]   When to call [address] Importance and benefits of: Follow up with PCP and specialist, medication adherence, self monitoring and reporting of symptoms. Plan:  Continue 3 week outreaches to provide telephonic support, education and resources as needed. Discuss / follow up on: Goal progress and outcome of f/u appt with neurosurgery, progress with aquatic therapy, and numbness in bilateral wrists.      Pt verbalized understanding and is agreeable to follow up contact. Lab Results       None                 Goals Addressed                   This Visit's Progress     Conditions and Symptoms   On track     I will schedule office visits, as directed by my provider. I will keep my appointment or reschedule if I have to cancel. I will notify my provider of any barriers to my plan of care. I will notify my provider of any symptoms that indicate a worsening of my condition. Barriers: none  Plan for overcoming my barriers: work with [facility]  Confidence: 10/10  Anticipated Goal Completion Date: 7/27/2022                Prior to Admission medications    Medication Sig Start Date End Date Taking? Authorizing Provider   busPIRone (BUSPAR) 10 MG tablet TAKE 1 TABLET BY MOUTH 3 TIMES A DAY 7/18/22   Yuri Villegas MD   oxyCODONE-acetaminophen (PERCOCET) 5-325 MG per tablet Take by mouth every 4 hours as needed for Pain. Historical Provider, MD   atorvastatin (LIPITOR) 40 MG tablet Take 1 tablet by mouth at bedtime 7/15/22   Anastacia Steinberg MD   rOPINIRole (REQUIP) 1 MG tablet Take 2 tablets by mouth nightly 7/15/22   Anastacia Steinberg MD   acetaminophen (TYLENOL 8 HOUR) 650 MG extended release tablet Take 1 tablet by mouth every 8 hours as needed for Pain 7/15/22   Josie Carcamo MD   miconazole (ZEASORB-AF) 2 % powder Apply 43 g topically as needed for Itching Apply topically 2 times daily. 7/15/22   Anastacia Steinberg MD   sertraline (ZOLOFT) 50 MG tablet Take 1 tablet by mouth in the morning. 7/15/22   Anastacia Steinberg MD   furosemide (LASIX) 20 MG tablet Take 0.5 tablets by mouth every other day 7/15/22   Josie Carcamo MD   diclofenac sodium (VOLTAREN) 1 % GEL Apply 2 g topically in the morning and 2 g at noon and 2 g in the evening and 2 g before bedtime.  7/15/22   Anastacia Steinberg MD   blood glucose test strips (PRODIGY NO CODING BLOOD GLUC) strip Use to test once daily and as needed as directed by provider. Please dispense Prodigy brand. 7/12/22   Alessia Echavarria MD   blood glucose monitor kit and supplies Dispense sufficient amount for indicated testing frequency plus additional to accommodate PRN testing needs. Dispense all needed supplies to include: monitor, strips, lancing device, lancets, control solutions, alcohol swabs. Prodigy Brand 7/6/22   Alessia Echavarria MD   Magnesium Oxide 400 MG CAPS Take 400 mg by mouth daily 7/6/22   Mo'Men Joesph Kussmaul, MD   gabapentin (NEURONTIN) 300 MG capsule Take 1 capsule by mouth 3 times daily for 30 days.  6/9/22 7/9/22  Radha Farah MD   celecoxib (CELEBREX) 100 MG capsule Take 1 capsule by mouth 2 times daily 4/15/22   Alessia Echavarria MD   ASPIRIN LOW DOSE 81 MG EC tablet TAKE 1 TABLET BY MOUTH ONE TIME A DAY 4/6/22   Miguel Angel Bird MD   glipiZIDE (GLUCOTROL) 10 MG tablet Take 1 tab bid 1/13/22   Alessia Echavarria MD   TRULICITY 1.5 ZL/8.0GX [facility] INJECT THE CONTENTS OF ONE SYRINGE UNDER THE SKIN ONCE WEEKLY 1/3/22   Paras Flower MD   SYNJARDY 5-1000 MG TABS TAKE 1 TABLET BY MOUTH 2 TIMES A DAY 11/1/21   Alessia Echavarria MD   omeprazole (PRILOSEC) 20 MG delayed release capsule Take 1 capsule by mouth 2 times daily 7/26/21   Alessia Echavarria MD   albuterol sulfate HFA (PROVENTIL HFA) 108 (90 Base) MCG/ACT inhaler Inhale 2 puffs into the lungs every 4 hours as needed for Wheezing 4/20/21 4/20/22  Alessia Echavarria MD   repaglinide (PRANDIN) 1 MG tablet TAKE 1 TABLET BY MOUTH 3 TIMES A DAY BEFORE MEALS 12/8/20   Alessia Echavarria MD   bimatoprost (LUMIGAN) 0.01 % SOLN ophthalmic drops Place 1 drop into both eyes nightly 4/26/18   Historical Provider, MD   PRODIGY LANCETS 28G MISC 1 Bottle by Does not apply route three times daily 4/26/19   Alessia Echavarria MD   Blood Glucose Monitoring Suppl (PRODIGY AUTOCODE BLOOD GLUCOSE) w/Device KIT 1 Device by Does not apply route 3 times daily 4/26/19   Alessia Echavarria MD       Future Appointments   Date Time Provider Department [place]   7/26/2022  2:00 PM Tremayne Rose, [place] [facility]   7/28/2022  5:00 PM Rosalio Dietrich, PTA [facility]   8/2/2022  4:15 PM Tremayne Rose, PTA [facility]   8/4/2022  5:00 PM Rosalio Dietrich, PTA [facility]   8/4/2022  6:00 PM Pina Bradley, PT [facility]   8/18/2022  8:30 AM [facility]   8/18/2022  9:30 AM [address], APRN - CNP [place] [facility]   11/9/2022  2:00 PM Omkar Snow MD [facility]

## 2022-07-28 ENCOUNTER — HOSPITAL ENCOUNTER (OUTPATIENT)
Dept: PHYSICAL THERAPY | Age: 62
Setting detail: THERAPIES SERIES
Discharge: HOME | End: 2022-07-28
Payer: COMMERCIAL

## 2022-07-28 PROCEDURE — 97113 AQUATIC THERAPY/EXERCISES: CPT

## 2022-07-28 NOTE — FLOWSHEET NOTE
[facility] Outpatient Physical Therapy   [address]   Phone: [phone]   Fax: [phone]    Physical Therapy Daily Treatment Note      Date:  2022  Patient Name:  Jacque Borja    :  1960  MRN: [id number]  Physician: Telly Urrutia MD (resident physician)  Carlos Delatorre MD (attending physician)                                  Insurance: Dorothyann Core. Auth required after 30 visits  Medical Diagnosis: M54.16 (ICD-10-CM) - Lumbar radiculopathy              Rehab Codes: M54.16  Onset Date: 2021 with recent exacerbation 2022                    Next 's appt: 7/15/22; NP at neurosurgeon 22   Visit# / total visits:  Cancels/No Shows: 2/0    Subjective:    : Patient reports her pain is more intense today. Patient states she has been volunteering at a community [employer] and sat for 3 hours and put laundry in the washer but that's only new activity she could think would cause more pain. Patient states B forearms and hands continue to be numb and tingling. Pain:  [x] Yes  [] No Location:(L) Lower back into buttock, (L) posterior/lateral thigh to knee; numbness in lower leg to ankle Pain Rating: (0-10 scale) 7/10  Pain altered Tx:  [x] No  [] Yes  Action:  Comments:     Objective:  Modalities:   Precautions:  Exercises:    [address] [facility] Exercise Log  Aquatic, Hip & DLS Program- Phase 1    Date of Eval:                                Primary PT: Brianda , PT  Diagnosis:   Things to Focus On (goals):   Surgical Precautions:  Medical Precautions:  [] C-9 dates  [] Occ Med   [] Medicare       Date 22    Visit # 12/26 13/26 14/26 15/26 16/26    Walk F/L/R 2 laps @ rail  1 lap retro 2 Laps @ Rail 2 Laps @ Rail 2 Laps @ Rail 2 Laps @ Rail    Marching 10x 12x 12x 2 Laps @ Rail 2 Laps @ Ger[employer] Mater 10x5\" 12x5\" 12x5\" 15x5\" 15x5\"    Step-Ups F/L      add   Step Down F/L SLR F/L/R 10x 12x 12x 15x 15x    Hip/Knee Flex/Ext 10x 12x 12x 15x 15x    F/L Lunges                  Kickboard Ex. Small  Small Small Small small    Iso Abd.  10x5\" 12x5\" 12x5\" 15x5\" 15x5\"    Push-pull 10x 12x 12x 15x 15x    Paddling                  UE Format:   Chest deep Chest Deep Chest deep    Horiz Abd/Add   10x 10x 15x    IR/ER (wipers)   10x 10x 15x    Alt Flex/Ext   10x 10x 15x    Alt Press Down   10x 10x 15x    Abd/Add   10x 10x 15x             Deep Water: 1 noodle  1 Noodle 1 Noodle 1 Noodle 1 Noodle    Hang 5' 5' 5' 5' 5'    Cycling 2' 2' 2' 2' 2'    Jacks  1' 2' 2' 2'    X-Country  1' 2' 2' 2'             Balance         SLS                  Stretches         Achllies         Hamstring  3x30\" 3x30\" 3x30\" 3x30\"    Piriformis                  Breast Stroke    1 Lap 1 Lap    Pain Rating 8 5-6 5-6 4 6        Specific Instructions for next treatment: Add step ups    Assessment: [x] Progressing toward goals. 7/28: Patient completed all exercise with slow guarded movements due to increase in pain. Progressed with increase repetitions during UE format but unable to complete step ups secondary to increase pain symptoms-will attempt next session if patients pain decreased. [] No change. [] Other:    Skilled PT intervention is required to help centralize/modulate sx, improve pain free lumbar AROM and maximize neuromuscular strength & stability necessary for unrestricted ADL/ work tolerances at her prior level of function.      STG: (to be met in 18 treatments)  Pt will self report worst pain no greater than 3/10 in order to better tolerate ADLs/work activities with minimal dysfunction SETBACK FROM RECENT HOSPITALIZATION   Pt will improve lumbar AROM to 90% or greater in all planes in order to demonstrate ability to move/reach in all planes unrestricted at PLOF SETBACK FROM RECENT HOSPITALIZATION  Pt will improve trunk strength to 4/5 or greater in all planes to show improved stability at prior level SETBACK FROM RECENT HOSPITALIZATION   Pt will self report reduced peripheral L LE pain (no distal than L knee) to demonstrate initial  favorable centralization response to therapy SETBACK FROM RECENT HOSPITALIZATION   LTG: (to be met in 26 treatments)  Pt will demonstrate improved B LE strength to 4/5 or greater in order to demonstrate improved stability/strength necessary for unrestricted ADLs/work activities SETBACK FROM RECENT HOSPITALIZATION  Pt will self report ability to complete all work duties with pain no greater than 1/10 to demonstrate unrestricted functional tolerances at prior level SETBACK FROM RECENT HOSPITALIZATION  Pt will improve core strength Sahrmann Grade 2/5 or greater to demonstrate better support and stability to lumbar spine SETBACK FROM RECENT HOSPITALIZATION   Pt will decrease SHEY to 10% impaired or less in order to demonstrate improved functional tolerances at PLOF with minimal restriction/dysfunction SETBACK FROM RECENT HOSPITALIZATION   Pt will demonstrate independence with a long term HEP for continued progress/maintenance after completion of PT SETBACK FROM RECENT HOSPITALIZATION       Patient Goals/Rehab Expectations: To alleviate pain and get back to normal    Pt. Education:  [] Yes  [] No  [x] Reviewed Prior HEP/Ed  Method of Education: [x] Verbal  [x] Demo  [] Written  Comprehension of Education:   [x] Verbalizes understanding. [x] Demonstrates understanding. [] Needs review. [] Demonstrates/verbalizes HEP/Ed previously given. Plan: [x] Continue per plan of care.    [] Other:       Treatment Charges: Mins Units   []  Modalities     []  Ther Exercise     []  Manual Therapy     []  Ther Activities     [x]  Aquatics 50 3   []  Neuromuscular     [] Vasocompression     [] Gait Training     [] Dry needling        [] 1 or 2 muscles        [] 3 or more muscles     []  Other     Total Treatment time 50 3     Time In: 500 PM            Time Out:  550 PM      Electronically signed by:  Henrry Bui, PTA

## 2022-07-29 ENCOUNTER — OFFICE VISIT (OUTPATIENT)
Dept: FAMILY MEDICINE CLINIC | Age: 62
End: 2022-07-29
Payer: COMMERCIAL

## 2022-07-29 VITALS
WEIGHT: 191.2 LBS | BODY MASS INDEX: 35.19 KG/M2 | TEMPERATURE: 96.9 F | DIASTOLIC BLOOD PRESSURE: 80 MMHG | HEART RATE: 76 BPM | HEIGHT: 62 IN | SYSTOLIC BLOOD PRESSURE: 130 MMHG

## 2022-07-29 DIAGNOSIS — G56.01 CARPAL TUNNEL SYNDROME OF RIGHT WRIST: Primary | ICD-10-CM

## 2022-07-29 DIAGNOSIS — M85.80 OSTEOPENIA, UNSPECIFIED LOCATION: ICD-10-CM

## 2022-07-29 PROCEDURE — 99213 OFFICE O/P EST LOW 20 MIN: CPT | Performed by: STUDENT IN AN ORGANIZED HEALTH CARE EDUCATION/TRAINING PROGRAM

## 2022-07-29 RX ORDER — ALENDRONATE SODIUM 70 MG/1
70 TABLET ORAL
Qty: 12 TABLET | Refills: 1 | Status: SHIPPED | OUTPATIENT
Start: 2022-07-29

## 2022-07-29 RX ORDER — INSULIN LISPRO 100 [IU]/ML
INJECTION, SOLUTION INTRAVENOUS; SUBCUTANEOUS
COMMUNITY
Start: 2022-06-11 | End: 2022-08-25 | Stop reason: ALTCHOICE

## 2022-07-29 ASSESSMENT — ENCOUNTER SYMPTOMS
VOMITING: 0
DIARRHEA: 0
ABDOMINAL PAIN: 0
CHEST TIGHTNESS: 0
BACK PAIN: 0
SHORTNESS OF BREATH: 0
NAUSEA: 0
COUGH: 0
COLOR CHANGE: 0
CONSTIPATION: 0

## 2022-07-29 NOTE — PATIENT INSTRUCTIONS
Thank you for letting us take care of you today. We hope all your questions were addressed. If a question was overlooked or something else comes to mind after you return home, please contact a member of your Care Team listed below. Your Care Team at [place] [address] is Team #1  Edith Abernathy MD (Faculty)  Shayne Mcdaniel MD(Resident)  Angely Klein MD (Resident)  Burton Barroso DO (Resident)  Alessandro Conte., UMESH Fagan., CHIVO Ferrara., SHERRY Ivey., Sania Cassidy., Lisa [facility] office)  Elba Mac, [address] (Clinical Practice Manager)  Jessica Grossman [facility] (Clinical Pharmacist)     Office phone number: [phone]    If you need to get in right away due to illness, please be advised we have \"Same Day\" appointments available Monday-Friday. Please call us at [phone] option #3 to schedule your \"Same Day\" appointment.

## 2022-07-29 NOTE — PROGRESS NOTES
Visit Information    Have you changed or started any medications since your last visit including any over-the-counter medicines, vitamins, or herbal medicines? no   Have you stopped taking any of your medications? Is so, why? -  no  Are you having any side effects from any of your medications? - no    Have you seen any other physician or provider since your last visit?  no   Have you had any other diagnostic tests since your last visit?  no   Have you been seen in the emergency room and/or had an admission in a hospital since we last saw you?  no   Have you had your routine dental cleaning in the past 6 months?  no     Do you have an active MyChart account? If no, what is the barrier?   Yes    Patient Care Team:  Cain Thompson MD as PCP - General (Family Medicine)  Cain Thompson MD as PCP - [facility]  Monica Torrez MD as Consulting Physician (Gastroenterology)  Saintclair Prey, RN as [address] [place] [address] History Review  Past Medical, Family, and Social History reviewed and does not contribute to the patient presenting condition    Health Maintenance   Topic Date Due    Shingles vaccine (1 of 2) Never done    Pneumococcal 0-64 years Vaccine (2 - PCV) 11/27/2016    Diabetic retinal exam  11/01/2017    Diabetic foot exam  12/01/2017    DTaP/Tdap/Td vaccine (2 - Tdap) 12/15/2020    COVID-19 Vaccine (3 - Booster for Moderna series) 07/11/2021    Diabetic microalbuminuria test  06/11/2022    Lipids  06/24/2022    Flu vaccine (1) 09/01/2022    Colorectal Cancer Screen  09/20/2022    Breast cancer screen  11/14/2022    A1C test (Diabetic or Prediabetic)  05/26/2023    Depression Monitoring  05/26/2023    Hepatitis C screen  Completed    HIV screen  Completed    Hepatitis A vaccine  Aged Out    Hib vaccine  Aged Out    Meningococcal (ACWY) vaccine  Aged Out

## 2022-07-29 NOTE — PROGRESS NOTES
Subjective:    Tammy White is a 65 y.o. female with  has a past medical history of Cervical spondylosis, Generalized anxiety disorder, Hyperlipidemia, Hypertension, Lumbar radiculopathy, DIEGO (nonalcoholic steatohepatitis), Obesity, Osteoarthritis of left knee, Recurrent major depressive disorder ([address] Restless legs syndrome, and Type II or unspecified type diabetes mellitus without mention of complication, not stated as uncontrolled. Presented to the office today for:  Chief Complaint   Patient presents with    Diabetes     Patient states she would like to discuss a EMG for her hands and patient refused A1C       HPI    65year-old with PMHx of HTN, HLD, depression and DM presents for DM follow up. HbA1c was 7.9 2 months ago. On trulicity. Patient has right wrist  CTS, was prescribed wrist splint previously. Reports no improvement of symptoms. Reports numbness in both arms and hands radiates down to ring and pinky fingers. She does exercises daily with no improvement. Pain most profound in the left hand. She is frustrated with her pain. She was seen by neurology who referred her to neurosurgery, she will has an appointment next month. Patient is already on leave till August 10. However she does not think that she will be able to go back to work by then. Review of Systems   Constitutional:  Negative for activity change, appetite change, chills, fatigue and fever. HENT: Negative. Respiratory:  Negative for cough, chest tightness and shortness of breath. Cardiovascular:  Negative for chest pain, palpitations and leg swelling. Gastrointestinal:  Negative for abdominal pain, constipation, diarrhea, nausea and vomiting. Genitourinary:  Negative for decreased urine volume, difficulty urinating, dysuria, frequency and hematuria. Musculoskeletal:  Positive for neck pain. Negative for back pain. Skin:  Negative for color change. Neurological:  Positive for numbness.  Negative for dizziness, weakness, light-headedness and headaches. The patient has a   Family History   Problem Relation Age of Onset    Heart Attack Mother     Diabetes Mother     Diabetes Father     Heart Attack Father        Objective:    /80 (Site: Left Upper Arm, Position: Sitting, Cuff Size: Large Adult) Comment: machine  Pulse 76   Temp 96.9 °F (36.1 °C) (Temporal)   Ht 5' 2.01\" (1.575 m)   Wt 191 lb 3.2 oz (86.7 kg)   BMI 34.96 kg/m²    BP Readings from Last 3 Encounters:   07/29/22 130/80   07/06/22 136/85   06/29/22 118/76       Physical Exam  Vitals and nursing note reviewed. Constitutional:       General: She is not in acute distress. Appearance: Normal appearance. She is not ill-appearing. HENT:      Head: Normocephalic and atraumatic. Mouth/Throat:      Pharynx: Oropharynx is clear. Cardiovascular:      Rate and Rhythm: Normal rate and regular rhythm. Pulses: Normal pulses. Heart sounds: No murmur heard. Pulmonary:      Effort: Pulmonary effort is normal.      Breath sounds: Normal breath sounds. Abdominal:      Palpations: Abdomen is soft. There is no mass. Tenderness: There is no abdominal tenderness. Musculoskeletal:         General: Tenderness present. No swelling. Cervical back: Tenderness present. Neurological:      General: No focal deficit present. Mental Status: She is alert and oriented to person, place, and time. Sensory: No sensory deficit. Motor: No weakness.        Lab Results   Component Value Date    WBC 10.9 06/13/2022    HGB 10.3 (L) 06/13/2022    HCT 33.7 (L) 06/13/2022     06/13/2022    CHOL 154 06/24/2021    TRIG 217 (H) 06/24/2021    HDL 59 06/24/2021    LDLDIRECT 156 (H) 05/20/2017    ALT 36 (H) 05/31/2022    AST 27 05/31/2022     07/06/2022    K 4.6 07/06/2022     07/06/2022    CREATININE 1.00 (H) 07/06/2022    BUN 15 07/06/2022    CO2 25 07/06/2022    TSH 1.37 07/06/2022    INR 1.0 07/23/2013    GLUF

## 2022-08-01 ENCOUNTER — APPOINTMENT (OUTPATIENT)
Dept: PHYSICAL THERAPY | Age: 62
End: 2022-08-01
Payer: COMMERCIAL

## 2022-08-01 ENCOUNTER — HOSPITAL ENCOUNTER (OUTPATIENT)
Dept: PHYSICAL THERAPY | Age: 62
Setting detail: THERAPIES SERIES
Discharge: HOME | End: 2022-08-01
Payer: COMMERCIAL

## 2022-08-01 ENCOUNTER — OFFICE VISIT (OUTPATIENT)
Dept: FAMILY MEDICINE CLINIC | Age: 62
End: 2022-08-01
Payer: COMMERCIAL

## 2022-08-01 VITALS
BODY MASS INDEX: 35.26 KG/M2 | HEIGHT: 62 IN | TEMPERATURE: 91.9 F | SYSTOLIC BLOOD PRESSURE: 134 MMHG | HEART RATE: 76 BPM | WEIGHT: 191.6 LBS | DIASTOLIC BLOOD PRESSURE: 80 MMHG

## 2022-08-01 DIAGNOSIS — M54.16 LUMBAR RADICULOPATHY: Primary | ICD-10-CM

## 2022-08-01 DIAGNOSIS — G56.03 BILATERAL CARPAL TUNNEL SYNDROME: ICD-10-CM

## 2022-08-01 DIAGNOSIS — M43.10 RETROLISTHESIS OF VERTEBRAE: ICD-10-CM

## 2022-08-01 PROCEDURE — 97110 THERAPEUTIC EXERCISES: CPT

## 2022-08-01 PROCEDURE — 97113 AQUATIC THERAPY/EXERCISES: CPT

## 2022-08-01 PROCEDURE — 99213 OFFICE O/P EST LOW 20 MIN: CPT | Performed by: STUDENT IN AN ORGANIZED HEALTH CARE EDUCATION/TRAINING PROGRAM

## 2022-08-01 NOTE — PROGRESS NOTES
Subjective:    Ayan Austin is a 65 y.o. female with  has a past medical history of Cervical spondylosis, Generalized anxiety disorder, Hyperlipidemia, Hypertension, Lumbar radiculopathy, DIEGO (nonalcoholic steatohepatitis), Obesity, Osteoarthritis of left knee, Recurrent major depressive disorder ([facility] [address] Restless legs syndrome, and Type II or unspecified type diabetes mellitus without mention of complication, not stated as uncontrolled. Presented to the office today for:  Chief Complaint   Patient presents with    Referral - General     Occupational     Diabetes     paperwork       HPI    65year-old with PMHx of HTN, HLD, depression and DM presents for short term disability paper work. She still reports sever pain and numbness in both hands, radiating to both elbows, back pain with sciatica, uses a cane to amublate. Short term disability paperwork was filled with return to work date on 9/1/2022    Has an appointment with neurosurgery in 2 weeks. Review of Systems  Constitutional:  Negative for activity change, appetite change, chills, fatigue and fever. HENT: Negative. Respiratory:  Negative for cough, chest tightness and shortness of breath. Cardiovascular:  Negative for chest pain, palpitations and leg swelling. Gastrointestinal:  Negative for abdominal pain, constipation, diarrhea, nausea and vomiting. Genitourinary:  Negative for decreased urine volume, difficulty urinating, dysuria, frequency and hematuria. Musculoskeletal:  Positive for neck pain. Negative for back pain. Skin:  Negative for color change. Neurological:  Positive for numbness. Negative for dizziness, weakness, light-headedness and headaches.        The patient has a   Family History   Problem Relation Age of Onset    Heart Attack Mother     Diabetes Mother     Diabetes Father     Heart Attack Father        Objective:    /80 (Site: Right Upper Arm, Position: Sitting, Cuff Size: Large Adult) Comment: machine Pulse 76   Temp (!) 91.9 °F (33.3 °C) (Temporal)   Ht 5' 2.01\" (1.575 m)   Wt 191 lb 9.6 oz (86.9 kg)   BMI 35.04 kg/m²    BP Readings from Last 3 Encounters:   08/01/22 134/80   07/29/22 130/80   07/06/22 136/85          Physical Exam  Vitals and nursing note reviewed. Constitutional:       General: She is not in acute distress. Appearance: Normal appearance. She is not ill-appearing. HENT:     Head: Normocephalic and atraumatic. Mouth/Throat:     Pharynx: Oropharynx is clear. Cardiovascular:     Rate and Rhythm: Normal rate and regular rhythm. Pulses: Normal pulses. Heart sounds: No murmur heard. Pulmonary:     Effort: Pulmonary effort is normal.     Breath sounds: Normal breath sounds. Abdominal:     Palpations: Abdomen is soft. There is no mass. Tenderness: There is no abdominal tenderness. Musculoskeletal:         General: Tenderness present. No swelling. Cervical back: Tenderness present. Neurological:     General: No focal deficit present. Mental Status: She is alert and oriented to person, place, and time. Sensory: No sensory deficit. Motor: No weakness. Lab Results   Component Value Date    WBC 10.9 06/13/2022    HGB 10.3 (L) 06/13/2022    HCT 33.7 (L) 06/13/2022     06/13/2022    CHOL 154 06/24/2021    TRIG 217 (H) 06/24/2021    HDL 59 06/24/2021    LDLDIRECT 156 (H) 05/20/2017    ALT 36 (H) 05/31/2022    AST 27 05/31/2022     07/06/2022    K 4.6 07/06/2022     07/06/2022    CREATININE 1.00 (H) 07/06/2022    BUN 15 07/06/2022    CO2 25 07/06/2022    TSH 1.37 07/06/2022    INR 1.0 07/23/2013    GLUF 262 (H) 09/10/2018    LABA1C 7.9 05/26/2022    LABMICR CANNOT BE CALCULATED 06/11/2021     Lab Results   Component Value Date    CALCIUM 9.3 07/06/2022    PHOS 3.1 07/21/2021     Lab Results   Component Value Date    LDLCHOLESTEROL 52 06/24/2021    LDLDIRECT 156 (H) 05/20/2017       Assessment and Plan:    1. Lumbar radiculopathy      2. Retrolisthesis of vertebrae    - [place]    3. Bilateral carpal tunnel syndrome    - [facility]        Requested Prescriptions      No prescriptions requested or ordered in this encounter       There are no discontinued medications. Kavya received counseling on the following healthy behaviors: nutrition, exercise and medication adherence    Discussed use,benefit, and side effects of prescribed medications. Barriers to medication compliance addressed. All patient questions answered. Pt voiced understanding. Return in about 3 weeks (around 8/22/2022) for follow up, back pain. Disclaimer: Some orall of this note was transcribed using voice-recognition software. This may cause typographical errors occasionally. Although all effort is made to fix these errors, please do not hesitate to contact our office if there Gary Roland concern with the understanding of this note.

## 2022-08-01 NOTE — PROGRESS NOTES
[facility]  [address] [place] [address]. [address]         Phone: [phone]       Fax: [phone]    Physical Therapy Progress Note    Date: 2022      Patient: Madelaine Castellanos  : 1960  MRN: [id number]    Physician: Desiree Schultz MD (resident physician)  Bridget Solis MD (attending physician)      Insurance: [employer]. Auth required after 30 visits   Diagnosis: M54.16 (ICD-10-CM) - Lumbar radiculopathy   Onset Date: 2021 with recent exacerbation 2022  Next  [address]:  with PCP.  with neurology  Total visits attended:   Cancels/No shows: 0/0  Date of initial visit: 22                    Subjective: Pt states that she's doing much better than she was at her previous assessment right out of the hospital, no longer using her walker as much. Has been managing ambulating around her home and short community distances primarily with her cane and feels like she has been getting stronger with aquatic therapy. Still feels weak in her L LE and especially in her L hand. Pt feels like she's regained a lot of use in her R hand but expresses concern with going back to work as she still has a lot of difficulty with fine motor control in her L hand. Follows up with her PCP later this afternoon and then neurology in the middle of August. Would like to continue working in the pool as she feels like it's been very helpful in managing her pain levels and improving her strength.   Pain:  [x] Yes  [] No  Location: L low back into L posterior thigh Pain Rating: (0-10 scale) 4/10 currently at rest. Elevates up to 8/10 at worst with excessive activity  Pain altered Tx:  [x] No  [] Yes  Action:  Comments:      Objective:     Range of Motion  Left Range of Motion  Right Strength  Left Strength  Right   Lumbar Flexion 50%  Inc pain in L low back and thigh 50% 3+/5 3+/5   Lumbar Extension 50%  L sided back tightness but no radicular sx 50% 4-/5 4-/5 Lumbar Rotation 50% 50% 4/5 4/5   Lumbar Side Bend 90% 90% 4-/5 4-/5   Hip Flexion WNL for all below WNL for all below 3/5 4-/5   Hip Abduction     3+/5 4-/5   Hip Adduction     4-/5 4/5   Hip Extension     3/5 4-/5   Hip ER           Hip IR           Knee Flexion     4-/5 4/5   Knee Extension     3/5 4/5   Dorsiflexion     4+/5 4+/5   Plantar flexion     4+/5 4+/5   Inversion           Eversion           *All LE MMT performed in modified (seated) positioning    30 second timed chair stand: 8 repetitions with minimal to no UE support      Assessment:  Pt has been seen for 17 PT visits to date, now nearly 2 months post-discharge from her recent hospital admission due to a severe exacerbation of L LE radicular sx. Overall pt demonstrates very good progress since previous assessment with improved gait safety and efficiency, using only a SPC versus 2WW at last assessment. Pt reports improved pain levels and less peripheralization with pt's sx primarily localized to L low back and posterior thigh also still reports numbness to L lower leg as well. Strength in B LEs and core is improving but continues to be weak most notably in her L LE. UE symptoms also seem to be improving after her hospitalization but pt still demonstrates a lot of fine motor control deficits in her L UE, limiting her ability to grasp and type. Encouraged pt to discuss with PCP at follow up visit today on an occupational therapy referral so that pt can work on L hand strength and fine motor control in OT, in addition to continuing aquatic therapy for gross motor deficits in PT. Otherwise, pt seems to be responding well to aquatic therapy and will continue POC as prescribed to address all stated limitations and goals below to restore this patient to prior independent level of function.      STG: (to be met in 18 treatments)  Pt will self report worst pain no greater than 3/10 in order to better tolerate ADLs/work activities with minimal dysfunction GOAL PROGRESSING AND EXTENDED THROUGH POC 8/1  Pt will improve lumbar AROM to 90% or greater in all planes in order to demonstrate ability to move/reach in all planes unrestricted at [facility] POC 8/1  Pt will improve trunk strength to 4/5 or greater in all planes to show improved stability at prior level GOAL PROGRESSING AND EXTENDED THROUGH POC 8/1  Pt will self report reduced peripheral L LE pain (no distal than L knee) to demonstrate initial  favorable centralization response to therapy GOAL PROGRESSING AND EXTENDED THROUGH POC 8/1 (SX PRIMARILY EXTEND TO KNEE, BUT OCCASIONALLY STILL RADIATE INTO LOWER LEG)  LTG: (to be met in 26 treatments)  Pt will demonstrate improved B LE strength to 4/5 or greater in order to demonstrate improved stability/strength necessary for unrestricted ADLs/work activities GOAL [address] POC 8/1  Pt will self report ability to complete all work duties with pain no greater than 1/10 to demonstrate unrestricted functional tolerances at prior level GOAL PROGRESSING AND EXTENDED THROUGH POC 8/1  Pt will improve core strength Sah[place]ann Grade 2/5 or greater to demonstrate better support and stability to lumbar spine GOAL PROGRESSING AND EXTENDED THROUGH POC 8/1  Pt will decrease SHEY to 10% impaired or less in order to demonstrate improved functional tolerances at PLOF with minimal restriction/dysfunction GOAL PROGRESSING AND EXTENDED THROUGH POC 8/1  Pt will demonstrate independence with a long term HEP for continued progress/maintenance after completion of PT GOAL PROGRESSING AND EXTENDED THROUGH POC 8/1      Treatment to Date:  [x] Therapeutic Exercise   69858  [] Iontophoresis: 4 mg/mL Dexamethasone Sodium Phosphate  mAmin  01290   [] Therapeutic Activity  97250 [] Vasopneumatic cold with compression  37412    [] Gait Training   05384 [] Ultrasound   [id number]   [] Neuromuscular Re-education  67106 [] Electrical Stimulation Unattended  22 [id number]   [] Manual Therapy  84284 [] Electrical Stimulation Attended  [id number]   [x] Instruction in HEP  [] Lumbar/Cervical Traction  [id number]   [x] Aquatic Therapy   [id number] [] Cold/hotpack    [] Massage   90290      [] Dry Needling, 1 or 2 muscles  80889   [] Biofeedback, first 15 minutes   86807  [] Biofeedback, additional 15 minutes   96145 [] Dry Needling, 3 or more muscles  64521      Patient Status:     [x] Continue per initial plan of care. [] Additional visits necessary. [] Other:      PT Re-evaluation Time: 9:05am-9:25am(20' billed for skilled reassessment of all subjective & objective measures, all goals and extension of current POC)   *SEE AQUATICS PTA NOTE FOR COMPLETE BILLING SUMMARY FOR TODAY'S DOS    Treatment Charges: Minutes Units   []  Ultrasound     []  Electrical-Stim     []  Iontophoresis     []  Traction     []  Massage       []  Eval     []  Gait     [x]  Ther Exercise 20 1   []  Manual Therapy       []  Ther Activities       []  Aquatics     []  Vasopneumatic Device     []  Neuro Re-Ed       []  Other       Total Treatment Time: 20 1         Electronically signed by: Omer Butler PT    If you have any questions or concerns, please don't hesitate to call. Thank you for your referral.    Physician Signature:________________________________Date:__________________  By signing above or cosigning this note, I have reviewed this plan of care and certify a need for medically necessary rehabilitation services.      *PLEASE SIGN ABOVE AND FAX BACK ALL PAGES*

## 2022-08-01 NOTE — PROGRESS NOTES
Visit Information    Have you changed or started any medications since your last visit including any over-the-counter medicines, vitamins, or herbal medicines? no   Have you stopped taking any of your medications? Is so, why? -  no  Are you having any side effects from any of your medications? - no    Have you seen any other physician or provider since your last visit?  no   Have you had any other diagnostic tests since your last visit?  no   Have you been seen in the emergency room and/or had an admission in a hospital since we last saw you?  no   Have you had your routine dental cleaning in the past 6 months?  no     Do you have an active MyChart account? If no, what is the barrier?   Yes    Patient Care Team:  Rowdy Carmichael MD as PCP - General (Family Medicine)  Rowdy Carmichael MD as PCP [place] [facility] [place]  Melissa Ray MD as Consulting Physician (Gastroenterology)  Petra Carrera RN as [address] [place] [address] History Review  Past Medical, Family, and Social History reviewed and does not contribute to the patient presenting condition    Health Maintenance   Topic Date Due    Shingles vaccine (1 of 2) Never done    Pneumococcal 0-64 years Vaccine (2 - PCV) 11/27/2016    Diabetic retinal exam  11/01/2017    Diabetic foot exam  12/01/2017    DTaP/Tdap/Td vaccine (2 - Tdap) 12/15/2020    COVID-19 Vaccine (3 - Booster for Moderna series) 07/11/2021    Diabetic microalbuminuria test  06/11/2022    Lipids  06/24/2022    Flu vaccine (1) 09/01/2022    Colorectal Cancer Screen  09/20/2022    Breast cancer screen  11/14/2022    A1C test (Diabetic or Prediabetic)  05/26/2023    Depression Monitoring  05/26/2023    Hepatitis C screen  Completed    HIV screen  Completed    Hepatitis A vaccine  Aged Out    Hib vaccine  Aged Out    Meningococcal (ACWY) vaccine  Aged Out

## 2022-08-01 NOTE — PROGRESS NOTES
Attending Physician Statement  I  have discussed the care of Janice Andrade including pertinent history and exam findings with the resident. I agree with the assessment, plan and orders as documented by the resident. /80 (Site: Right Upper Arm, Position: Sitting, Cuff Size: Large Adult) Comment: machine  Pulse 76   Temp (!) 91.9 °F (33.3 °C) (Temporal)   Ht 5' 2.01\" (1.575 m)   Wt 191 lb 9.6 oz (86.9 kg)   BMI 35.04 kg/m²    BP Readings from Last 3 Encounters:   08/01/22 134/80   07/29/22 130/80   07/06/22 136/85     Wt Readings from Last 3 Encounters:   08/01/22 191 lb 9.6 oz (86.9 kg)   07/29/22 191 lb 3.2 oz (86.7 kg)   07/15/22 195 lb (88.5 kg)          Diagnosis Orders   1. Lumbar radiculopathy        2. Retrolisthesis of vertebrae  [place]      3.  Bilateral carpal tunnel syndrome  [facility] [place], [facility] [address], [address] [place] [address] 8/1/2022 4:41 PM

## 2022-08-01 NOTE — FLOWSHEET NOTE
[address] [facility] Outpatient Physical Therapy   [address] [facility] [address]   Phone: [phone]   Fax: [phone]    Physical Therapy Daily Treatment Note      Date:  2022  Patient Name:  Jackie Bravo    :  1960  MRN: [id number]  Physician: Jaleel Fallon MD (resident physician)  Hugh Galindo MD (attending physician)                                  Insurance: Lucrezia Deep. Auth required after 30 visits  Medical Diagnosis: M54.16 (ICD-10-CM) - Lumbar radiculopathy              Rehab Codes: M54.16  Onset Date: 2021 with recent exacerbation 2022                    Next 's appt: 7/15/22; NP at neurosurgeon 22   Visit# / total visits:  Cancels/No Shows: 2/0    Subjective:    : Patient arrived noting she has had a busy day and feels a little fatigued/sore. Patient states she had a follow up with her MD and was given OT referral to work on L finger dexterity. Patient states she was told there is a lot of narrowing in her cervical spine so that could be cause of new onset symptoms, was advised to keep neurosurgeon appointment in mid august.       Pain:  [x] Yes  [] No Location:(L) Lower back into buttock, (L) posterior/lateral thigh to knee; numbness in lower leg to ankle Pain Rating: (0-10 scale) 5/10  Pain altered Tx:  [x] No  [] Yes  Action:  Comments:     Objective:  Modalities:   Precautions:  Exercises:    [address] [facility] Exercise Log  Aquatic, Hip & DLS Program- Phase 1    Date of Eval:                                Primary PT: Nicole Life, PT  Diagnosis:   Things to Focus On (goals):   Surgical Precautions:  Medical Precautions:  [] C-9 dates  [] Occ Med   [] Medicare       Date 22   Visit # 12/26 13/26 14/26 15/26 16/26 17/26   Walk F/L/R 2 laps @ rail  1 lap retro 2 Laps @ Rail 2 Laps @ Rail 2 Laps @ Rail 2 Laps @ Rail 2 Laps @ Rail   Marching 10x 12x 12x 2 Laps @ Rail 2 Laps @ Rail 2 Laps @ [place] Rose 10x5\" 12x5\" 12x5\" 15x5\" 15x5\" 15x5\"   Step-Ups F/L      Low box  10x F    Step Down F/L         SLR F/L/R 10x 12x 12x 15x 15x 15x   Hip/Knee Flex/Ext 10x 12x 12x 15x 15x 15x   F/L Lunges                  Kickboard Ex. Small  Small Small Small small Medium    Iso Abd.  10x5\" 12x5\" 12x5\" 15x5\" 15x5\" 15x5\"   Push-pull 10x 12x 12x 15x 15x 15x   Paddling                  UE Format:   Chest deep Chest Deep Chest deep Chest deep    Horiz Abd/Add   10x 10x 15x 15x   IR/ER (wipers)   10x 10x 15x 15x   Alt Flex/Ext   10x 10x 15x 15x   Alt Press Down   10x 10x 15x 15x   Abd/Add   10x 10x 15x 15x            Deep Water: 1 noodle  1 Noodle 1 Noodle 1 Noodle 1 Noodle 1 Noodle   Hang 5' 5' 5' 5' 5' 5'   Cycling 2' 2' 2' 2' 2' 2'   Jacks  1' 2' 2' 2' 2'   X-Country  1' 2' 2' 2' 2'            Balance         SLS                  Stretches         Achllies         Hamstring  3x30\" 3x30\" 3x30\" 3x30\" 3x30\"   Piriformis                  Breast Stroke    1 Lap 1 Lap 1 Lap   Pain Rating 8 5-6 5-6 4 6 4       Specific Instructions for next treatment: Add step ups    Assessment: [x] Progressing toward goals. 8/1: Despite increase fatigue and soreness patient able to complete all charted exercises with addition of forward step ups, and progression of medium kick board to continue to work on LE strength and core stability deficits. No reports of increased symptoms noted while in aquatic environment. Patient continues to have most relief of pain with deep water hang stating \"pressure\" in lower lumbar dissipates. [] No change. [] Other:    Skilled PT intervention is required to help centralize/modulate sx, improve pain free lumbar AROM and maximize neuromuscular strength & stability necessary for unrestricted ADL/ work tolerances at her prior level of function.      STG: (to be met in 18 treatments)  Pt will self report worst pain no greater than 3/10 in order to better tolerate ADLs/work activities with minimal dysfunction GOAL PROGRESSING AND EXTENDED THROUGH POC 8/1  Pt will improve lumbar AROM to 90% or greater in all planes in order to demonstrate ability to move/reach in all planes unrestricted at [facility] POC 8/1  Pt will improve trunk strength to 4/5 or greater in all planes to show improved stability at prior level GOAL PROGRESSING AND EXTENDED THROUGH POC 8/1  Pt will self report reduced peripheral L LE pain (no distal than L knee) to demonstrate initial  favorable centralization response to therapy GOAL PROGRESSING AND EXTENDED THROUGH POC 8/1 (SX PRIMARILY EXTEND TO KNEE, BUT OCCASIONALLY STILL RADIATE INTO LOWER LEG)  LTG: (to be met in 26 treatments)  Pt will demonstrate improved B LE strength to 4/5 or greater in order to demonstrate improved stability/strength necessary for unrestricted ADLs/work activities GOAL [address] POC 8/1  Pt will self report ability to complete all work duties with pain no greater than 1/10 to demonstrate unrestricted functional tolerances at prior level GOAL PROGRESSING AND EXTENDED THROUGH POC 8/1  Pt will improve core strength Sahrmann Grade 2/5 or greater to demonstrate better support and stability to lumbar spine GOAL PROGRESSING AND EXTENDED THROUGH POC 8/1  Pt will decrease SHEY to 10% impaired or less in order to demonstrate improved functional tolerances at PLOF with minimal restriction/dysfunction GOAL PROGRESSING AND EXTENDED THROUGH POC 8/1  Pt will demonstrate independence with a long term HEP for continued progress/maintenance after completion of PT GOAL PROGRESSING AND EXTENDED THROUGH POC 8/1      Patient Goals/Rehab Expectations: To alleviate pain and get back to normal    Pt. Education:  [] Yes  [] No  [x] Reviewed Prior HEP/Ed  Method of Education: [x] Verbal  [x] Demo  [] Written  Comprehension of Education:   [x] Verbalizes understanding. [x] Demonstrates understanding. [] Needs review.   []

## 2022-08-01 NOTE — PATIENT INSTRUCTIONS
Thank you for letting us take care of you today. We hope all your questions were addressed. If a question was overlooked or something else comes to mind after you return home, please contact a member of your Care Team listed below. Your Care Team at [place] [address] is Team #1  Priya Hunter MD (Faculty)  Yonas Borja MD(Resident)  Willian Walker MD (Resident)  Shane Loving DO (Resident)  Pancho Mendoza., UMESH Holly., CHIVO Easley., SHERRY Ordonez., Lee Campo., Brittani Curling [facility] office)  Bartolo Ahumada, [address] (Clinical Practice Manager)  Jasper Gabriel [facility] (Clinical Pharmacist)     Office phone number: [phone]    If you need to get in right away due to illness, please be advised we have \"Same Day\" appointments available Monday-Friday. Please call us at [phone] option #3 to schedule your \"Same Day\" appointment.

## 2022-08-02 ENCOUNTER — APPOINTMENT (OUTPATIENT)
Dept: PHYSICAL THERAPY | Age: 62
End: 2022-08-02
Payer: COMMERCIAL

## 2022-08-04 ENCOUNTER — APPOINTMENT (OUTPATIENT)
Dept: PHYSICAL THERAPY | Age: 62
End: 2022-08-04
Payer: COMMERCIAL

## 2022-08-09 DIAGNOSIS — F33.9 RECURRENT MAJOR DEPRESSIVE DISORDER, REMISSION STATUS UNSPECIFIED (HCC): ICD-10-CM

## 2022-08-10 RX ORDER — ASPIRIN 81 MG/1
TABLET, COATED ORAL
Qty: 30 TABLET | Refills: 2 | Status: SHIPPED | OUTPATIENT
Start: 2022-08-10

## 2022-08-10 NOTE — TELEPHONE ENCOUNTER
E-scribe request for med refill. Please review and e-scribe if applicable. Last Visit Date:  08/01/2022  Next Visit Date:  8/9/2022    Hemoglobin A1C (%)   Date Value   05/26/2022 7.9   09/14/2021 7.7   06/24/2021 7.9 (H)             ( goal A1C is < 7)   Microalb/Crt.  Ratio (mcg/mg creat)   Date Value   06/11/2021 CANNOT BE CALCULATED     LDL Cholesterol (mg/dL)   Date Value   06/24/2021 52       (goal LDL is <100)   AST (U/L)   Date Value   05/31/2022 27     ALT (U/L)   Date Value   05/31/2022 36 (H)     BUN (mg/dL)   Date Value   07/06/2022 15     BP Readings from Last 3 Encounters:   08/01/22 134/80   07/29/22 130/80   07/06/22 136/85          (goal 120/80)        Patient Active Problem List:     Cervical spondylosis     Type II or unspecified type diabetes mellitus without mention of complication, not stated as uncontrolled     Hypertension     Abnormal auditory perception     Eustachian tube dysfunction     Chronic serous otitis media     Nasal polyps     Nasal congestion     Osteoarthritis of left knee     Osteoarthritis of right knee     DIEGO (nonalcoholic steatohepatitis)     Vitamin D deficiency     Iron deficiency anemia     Dyslipidemia     Diabetes mellitus type 2, uncontrolled (HCC)     Left hip pain     Trochanteric bursitis     Fatigue     Daytime somnolence     Snoring     Chronic left shoulder pain     Restless legs syndrome     Generalized anxiety disorder     Primary osteoarthritis, left shoulder     Tendinopathy of left shoulder     Adhesive capsulitis of left shoulder     Lumbar radiculopathy     Back pain     Toxic metabolic encephalopathy     Class 2 obesity in adult     Ulnar neuropathy of both upper extremities     Leg edema     Recurrent major depressive disorder (HCC)     Dermatitis     Retrolisthesis of vertebrae     Bilateral carpal tunnel syndrome      ----Mary Ka

## 2022-08-10 NOTE — TELEPHONE ENCOUNTER
Last visit: [id number]  Last Med refill: [id number]  Does patient have enough medication for 72 hours: Yes    Next Visit Date:  Future Appointments   Date Time Provider Nicola Zaidi   8/11/2022  4:00 PM Elige Sacks, PTA [facility]   8/12/2022  1:00 PM Kevin Zavaleta, OT [facility]   8/18/2022  9:30 AM Tracey Mcgowan APRN - CNP [place] [facility]   8/23/2022  3:00 PM Lul Steiner MD [facility] FP [facility]   9/15/2022  2:30 PM [facility]  [facility]   9/15/2022  2:30 PM Axel Moralez MD [facility]   11/9/2022  2:00 PM MoMal Jones MD Neuro [address] Maintenance   Topic Date Due    Shingles vaccine (1 of 2) Never done    Pneumococcal 0-64 years Vaccine (2 - PCV) 11/27/2016    Diabetic retinal exam  11/01/2017    Diabetic foot exam  12/01/2017    DTaP/Tdap/Td vaccine (2 - Tdap) 12/15/2020    COVID-19 Vaccine (3 - Booster for Moderna series) 07/11/2021    Diabetic microalbuminuria test  06/11/2022    Lipids  06/24/2022    Flu vaccine (1) 09/01/2022    Colorectal Cancer Screen  09/20/2022    Breast cancer screen  11/14/2022    A1C test (Diabetic or Prediabetic)  05/26/2023    Depression Monitoring  05/26/2023    Hepatitis C screen  Completed    HIV screen  Completed    Hepatitis A vaccine  Aged Out    Hib vaccine  Aged Out    Meningococcal (ACWY) vaccine  Aged Out       Hemoglobin A1C (%)   Date Value   05/26/2022 7.9   09/14/2021 7.7   06/24/2021 7.9 (H)             ( goal A1C is < 7)   Microalb/Crt.  Ratio (mcg/mg creat)   Date Value   06/11/2021 CANNOT BE CALCULATED     LDL Cholesterol (mg/dL)   Date Value   06/24/2021 52   04/20/2021 48       (goal LDL is <100)   AST (U/L)   Date Value   05/31/2022 27     ALT (U/L)   Date Value   05/31/2022 36 (H)     BUN (mg/dL)   Date Value   07/06/2022 15     BP Readings from Last 3 Encounters:   08/01/22 134/80   07/29/22 130/80   07/06/22 136/85          (goal 120/80)    All Future Testing planned in [facility]  Lab Frequency Next Occurrence   Basic Metabolic Panel Once 64/63/3975   Saline lock IV Once 05/10/2022   Lumbar Epidural Steroid Injection/Caudal Once 11/10/2021   Microalbumin, Ur Once 06/26/2022   Lipid Panel Once 06/26/2022   Comprehensive Metabolic Panel Once 33/50/0011   Vitamin D 25 Hydroxy Once 05/26/2022   Sleep Study with PAP Titration Once 07/08/2022   Baseline Diagnostic Sleep Study Once 07/08/2022   EMG Once 07/29/2022               Patient Active Problem List:     Cervical spondylosis     Type II or unspecified type diabetes mellitus without mention of complication, not stated as uncontrolled     Hypertension     Abnormal auditory perception     Eustachian tube dysfunction     Chronic serous otitis media     Nasal polyps     Nasal congestion     Osteoarthritis of left knee     Osteoarthritis of right knee     DIEGO (nonalcoholic steatohepatitis)     Vitamin D deficiency     Iron deficiency anemia     Dyslipidemia     Diabetes mellitus type 2, uncontrolled (HCC)     Left hip pain     Trochanteric bursitis     Fatigue     Daytime somnolence     Snoring     Chronic left shoulder pain     Restless legs syndrome     Generalized anxiety disorder     Primary osteoarthritis, left shoulder     Tendinopathy of left shoulder     Adhesive capsulitis of left shoulder     Lumbar radiculopathy     Back pain     Toxic metabolic encephalopathy     Class 2 obesity in adult     Ulnar neuropathy of both upper extremities     Leg edema     Recurrent major depressive disorder (HCC)     Dermatitis     Retrolisthesis of vertebrae     Bilateral carpal tunnel syndrome

## 2022-08-11 ENCOUNTER — HOSPITAL ENCOUNTER (OUTPATIENT)
Dept: PHYSICAL THERAPY | Age: 62
Setting detail: THERAPIES SERIES
Discharge: HOME | End: 2022-08-11
Payer: COMMERCIAL

## 2022-08-11 PROCEDURE — 97113 AQUATIC THERAPY/EXERCISES: CPT

## 2022-08-11 NOTE — FLOWSHEET NOTE
15x   F/L Lunges                  Kickboard Ex. Small Small Small small Medium  Medium    Iso Abd.  12x5\" 12x5\" 15x5\" 15x5\" 15x5\" 15x5\"   Push-pull 12x 12x 15x 15x 15x 15x   Paddling                  UE Format:  Chest deep Chest Deep Chest deep Chest deep  Chest deep  2 sm balls    Horiz Abd/Add  10x 10x 15x 15x 15x   IR/ER (wipers)  10x 10x 15x 15x 15x   Alt Flex/Ext  10x 10x 15x 15x 15x   Alt Press Down  10x 10x 15x 15x 15x   Abd/Add  10x 10x 15x 15x 15x            Deep Water: 1 Noodle 1 Noodle 1 Noodle 1 Noodle 1 Noodle 1 Noodle   Hang 5' 5' 5' 5' 5' 5'   Cycling 2' 2' 2' 2' 2' 2'   Jacks 1' 2' 2' 2' 2' 2'   X-Country 1' 2' 2' 2' 2' 2'            Balance         SLS                  Stretches         Achllies         Hamstring 3x30\" 3x30\" 3x30\" 3x30\" 3x30\" 3x30\"   Piriformis                  Breast Stroke   1 Lap 1 Lap 1 Lap 1 Lap   Pain Rating 5-6 5-6 4 6 4 4       Specific Instructions for next treatment:     Assessment: [x] Progressing toward goals. 8/11: Completed charted exercises with addition of resistance using 2 small balls during UE format in order to continue to challenge core and dorsal stability. Patient did not have increase pain symptoms entire session. Cueing required for deeper Hamstring stretch at step with hip hinging in attempt to centralize pain with minimal success today. Continues to have most relief with deep water hang. [] No change. [] Other:    Skilled PT intervention is required to help centralize/modulate sx, improve pain free lumbar AROM and maximize neuromuscular strength & stability necessary for unrestricted ADL/ work tolerances at her prior level of function.      STG: (to be met in 18 treatments)  Pt will self report worst pain no greater than 3/10 in order to better tolerate ADLs/work activities with minimal dysfunction GOAL PROGRESSING AND EXTENDED THROUGH POC 8/1  Pt will improve lumbar AROM to 90% or greater in all planes in order to demonstrate ability to move/reach in all planes unrestricted at [facility] POC 8/1  Pt will improve trunk strength to 4/5 or greater in all planes to show improved stability at prior level GOAL PROGRESSING AND EXTENDED THROUGH POC 8/1  Pt will self report reduced peripheral L LE pain (no distal than L knee) to demonstrate initial  favorable centralization response to therapy GOAL PROGRESSING AND EXTENDED THROUGH POC 8/1 (SX PRIMARILY EXTEND TO KNEE, BUT OCCASIONALLY STILL RADIATE INTO LOWER LEG)  LTG: (to be met in 26 treatments)  Pt will demonstrate improved B LE strength to 4/5 or greater in order to demonstrate improved stability/strength necessary for unrestricted ADLs/work activities [address] [facility] POC 8/1  Pt will self report ability to complete all work duties with pain no greater than 1/10 to demonstrate unrestricted functional tolerances at prior level GOAL PROGRESSING AND EXTENDED THROUGH POC 8/1  Pt will improve core strength Sahrmann Grade 2/5 or greater to demonstrate better support and stability to lumbar spine GOAL PROGRESSING AND EXTENDED THROUGH POC 8/1  Pt will decrease SHEY to 10% impaired or less in order to demonstrate improved functional tolerances at PLOF with minimal restriction/dysfunction GOAL PROGRESSING AND EXTENDED THROUGH POC 8/1  Pt will demonstrate independence with a long term HEP for continued progress/maintenance after completion of PT GOAL PROGRESSING AND EXTENDED THROUGH POC 8/1      Patient Goals/Rehab Expectations: To alleviate pain and get back to normal    Pt. Education:  [] Yes  [] No  [x] Reviewed Prior HEP/Ed  Method of Education: [x] Verbal  [x] Demo  [] Written  Comprehension of Education:   [x] Verbalizes understanding. [x] Demonstrates understanding. [] Needs review. [] Demonstrates/verbalizes HEP/Ed previously given. Plan: [x] Continue per plan of care.    [] Other:       Treatment Charges: Mins Units   []  Modalities     []  Ther Exercise     []  Manual Therapy     []  Ther Activities     [x]  Aquatics 55 4   []  Neuromuscular     [] Vasocompression     [] Gait Training     [] Dry needling        [] 1 or 2 muscles        [] 3 or more muscles     []  Other     Total Treatment time 55 4     Time In: 618 PM            Time Out:  976 PM    Electronically signed by:  Debbie Mathews PTA

## 2022-08-12 ENCOUNTER — HOSPITAL ENCOUNTER (OUTPATIENT)
Dept: OCCUPATIONAL THERAPY | Age: 62
Setting detail: THERAPIES SERIES
Discharge: HOME | End: 2022-08-12
Payer: COMMERCIAL

## 2022-08-12 PROCEDURE — 97110 THERAPEUTIC EXERCISES: CPT

## 2022-08-12 PROCEDURE — 97165 OT EVAL LOW COMPLEX 30 MIN: CPT

## 2022-08-12 NOTE — CONSULTS
[] [facility]       Occupational Therapy 1st floor       [address] [facility]         Phone: [phone]       Fax: [phone]  [x] [facility] ([facility]) @ [facility]  [address] [facility] [address]  [place] [address] [facility]  Phone [phone]  Fax [phone]        Occupational Therapy Hand & Upper Extremity  Initial Evaluation    Date: 2022      Patient: Skye Cantrell  : 1960  MRN: [id number]  Referring Provider:  Katherine Foy MD  [facility]  Medical Diagnosis: Retrolisthesis of vertebrae (M43.10) Bilateral Carpal Tunnel (G56.03)   Rehab Codes: pain in right upper arm M79.621,, pain in left upper arm M79.622, , lack of coordination unspecified R27.0,, or parasthesia of skin R20.2,  Onset Date: 22    Next  [address]: 22    Subjective:   CC: [facility], numbness in B hands   HPI: 22, pt stated that she was trying to pull a drawer when she got a sharp pn in her back. Pt went to ED where Pt stated that she sat w/ arms crossed on the bed leaning on it for \"days\". After sitting in that postion pt reports the numbness and tingling in B UE began. Mechanism of Injury: Leaning on arms for prolonged period of time Surgery Date:  N/A Precautions: None    Involved Extremity: Bilateral   Dominant Extremity: Right    Past Medical History: Refer to [facility]          Comorbidities: NA    Medications: Refer to Medical chart in [facility] Allergies:  Refer to Medical chart in [facility]    Pain: Intensity:  2 /10 Location: B forearm at night     Pain Type: Intermittant  Pain Altered Tx: no  Action Taken:none            Home Environment:    Pt lives in a  and [employer] With 4+ and L Handrail STORM  The washing machine is on and the main level    [employer] credentialing    Job Status []Normal duty []Light duty [x] Off due to condition []Retired  []Not employed []Disability  []Other:  []  Return to work:    Work activities/duties Avocational Activities     [] Keo De Oliveira     [] Grandparenting     [] Care giver [] OTHER    [x] NA       ADL/IADL Previous level of function Current level of function Who assists or modifications made or needed   Bathing  [x] Independent    [] Assist  [x] Independent    [] Assist     Dress/grooming [x] Independent    [] Assist  [x] Independent    [] Assist   Buttons are difficult    Transfer/mobility [x] Independent    [] Assist  [x] Independent    [] Assist     Feeding [x] Independent    [] Assist  [x] Independent    [] Assist     Toileting [x] Independent    [] Assist  [x] Independent    [] Assist     Driving [x] Independent    [] Assist  [] Independent    [x] Assist      Housekeeping [x] Independent    [] Assist  [] Independent    [x] Assist     Grocery shop/meal prep [x] Independent    [] Assist  [] Independent    [x] Assist       Device: Straight Cane   Orthosis: Currently has    Objective: Tests/Measurements: Upper Extremity Functional Index  Current Functional Level:  32 functionally impaired as measured with the Upper Extremity Functional Index Survey. 0-80 scale, with 80 = no Deficits  (The UEFI model does not provide any specific cut off points that could classify the upper limb disability degree, however, a minimal detectable change of 9 points is provided. This means that for improvement or deterioration to be considered, between two subsequent evaluations, the scores must differ by at least 9 points.)      UE ROM/MMT   Right Left MMT   Shoulder      Flexion [facility]    [facility] WFL    Adduction WFL WFL    Abduction [facility]    Internal Rotation [facility]    External Rotation [facility] WFL    Elbow      Flexion WFL WFL    [facility] WFL    Pronation WFL WFL    Supination  WFL WFL    Wrist      Flexion WFL WFL    [facility] WFL    Radial deviation WFL WFL    Ulnar deviation WFL WFL         COORDINATION  - Fine Motor (speed/dexterity)   Right in seconds Percentile Left in seconds Percentile   9 Hole Peg Test 39 sec  49      STRENGTH       Right   (pounds) Left   (pounds)    Position 1 2 1    position 2 15 15   Lateral pinch 6 3   2 point pinch 5 2   3 jaw pinch 5 2     Bilateral  strength is normally symmetrical or up to 10% stronger on the dominant extremity, depending on the individual's physically activity level. Sensibility: Numbness, Tingling, and Constant Edema: None      Skin Color: Normal Skin/Scar condition Not tested    Problems: Function, strength, sensation     Assessment:  Patient would benefit from skilled occupational therapy services in order to: Improve, Increase, and Restore  functional /grasp, Motor control/Tone in UE, Strength, Activity tolerance, and Coordination deficit in order to improve functional use of UE in ADL performance and return to PLOF     Short Term Goals: (  8    Treatments)  Decrease numbness/tingling per subjective report   Increase strength (pounds);  strength by 5 lbs  Increase pinch strength by 3lbs  Pt will increase 9-hole pef test by 10 seconds on each UE  Increase function:UE Functional Index Score 8 or more points to promote increased functional abilities  Demo knowledge of fall prevention in 2 sessions. Patient to be independent with home exercise program as demonstrated by performance with correct form without cues. Long Term Goals: (  16  Treatments)  Pt will increase 9 hole peg test time by 15 seconds   Pt will improve UEFI score by 15 points  Pt will increase  strength by 10 lbs  Pt will increase pinch strength by 5 lbs    Patient Goals: to reduce symptoms so she can return to work    Treatment Potential: Good Suggested Professional Referral: No  Domestic Concerns: No   Barriers to Goal Achievement: No    Comments/Assessment: Pt is a 62/ yr/old R hand dominate female who presents to clinic w/ B hand paresthesia.  Per pt subjective it began on 5/30 after being in the hospital for a back injury and leaning on hospital bed w/ her arms crossed. Signs  and symptoms consistent w/ ulnar nerve entrapment as evidenced by paresthesia along the distrubution of the ulnar nerve in the RF/SF of B hands w/ positive tinel's sign at the arcades of struthers, medial epicondyle, and FCU muscle belly. Home Program Initiated: Written Comprehension of Education: Yes       Goals, Discussed with, and Patient    Treatment Plan   [x]  Therapeutic Exercise   49402              []  Iontophoresis: 4 mg/mL Dexamethasone Sodium Phosphate  mAmin  15912    [x]  Therapeutic Activity  16234  []  Vasopneumatic cold with compression  39591                []  ADL training  27668  []  Ultrasound        [id number]    []  Neuromuscular Re-education  67155  []  Electrical Stimulation Attended  43396    [x]  Manual Therapy  13272  Orthotic    []  Fit  05936     []  Train 05144    []  Instruction in HEP        Prosthetic    []  Fit 02626      []  Train 67842    []  Cognitive Interventions, (first 15 min 12765, subsequent 15 min 80307)  []  Cold/hotpack      [x]  Massage   75783           []  Medication allergies reviewed for use of    Dexamethasone Sodium Phosphate 4mg/ml     with iontophoresis treatments. Pt is not allergic. Treatment Plan:  Frequency: 2 x/week for 30 visits     Today's Treatment:  Modalities:  Precautions: N/A   Exercise Flow Sheet:  Exercise Reps/Time Weight/Level Comments   Tendon glide 10  Completed   Ulnar nerve glide 10  Completed    gripping 10 yellow Completed    Thumb opposition 10  Completed               Other:     Specific Instructions for Next Treatment: cont w/ AROM, strengthening, and nerve glides  Evaluation Complexity:  History (Personal factors, comorbidities) []  0 [x]  1-2 []  3+   Exam (limitations, restrictions) [x]  1-2 []  3 []  4+   Decision Making [x]  Low []  Moderate []  High   ?  [x]  Low Complexity []  Moderate   Complexity []  High Complexity      Treatment Charges: Mins Units Time In/Out Evaluation  []  High  []  Moderate  [x]  Low  30  1    [x]  Ther Exercise 15 1    []  Manual Therapy      []  Ther Activities      []  Orthotic fit/train      []  Orthotic recheck      []        Total Treatment time 45 min 2      Time In: 9251   Time out: 1400 Electronically signed by Millie Ross OT on 8/12/2022 at 1:00 PM    Physician Signature: _________________________ Date: _______________  By signing above or cosigning this note, I have reviewed this plan of care and certify a need for medically necessary rehabilitation services.      *PLEASE SIGN ABOVE AND FAX BACK ALL PAGES*

## 2022-08-12 NOTE — PROGRESS NOTES
Attending Physician Statement  I  have discussed the care of Adriane Sellers including pertinent history and exam findings with the resident. I agree with the assessment, plan and orders as documented by the resident. /80 (Site: Left Upper Arm, Position: Sitting, Cuff Size: Large Adult) Comment: machine  Pulse 76   Temp 96.9 °F (36.1 °C) (Temporal)   Ht 5' 2.01\" (1.575 m)   Wt 191 lb 3.2 oz (86.7 kg)   BMI 34.96 kg/m²    BP Readings from Last 3 Encounters:   08/01/22 134/80   07/29/22 130/80   07/06/22 136/85     Wt Readings from Last 3 Encounters:   08/01/22 191 lb 9.6 oz (86.9 kg)   07/29/22 191 lb 3.2 oz (86.7 kg)   07/15/22 195 lb (88.5 kg)          Diagnosis Orders   1. Carpal tunnel syndrome of right wrist  EMG      2.  Osteopenia, unspecified location  alendronate (FOSAMAX) 70 MG tablet          Ravinder Olvera DO 8/12/2022 9:31 AM

## 2022-08-15 ENCOUNTER — HOSPITAL ENCOUNTER (OUTPATIENT)
Dept: PHYSICAL THERAPY | Age: 62
Setting detail: THERAPIES SERIES
Discharge: HOME | End: 2022-08-15
Payer: COMMERCIAL

## 2022-08-15 PROCEDURE — 97113 AQUATIC THERAPY/EXERCISES: CPT

## 2022-08-15 NOTE — FLOWSHEET NOTE
[facility]   [address] [place] [address] [address]   Phone: [phone]   Fax: [phone]    Physical Therapy Daily Treatment Note      Date:  8/15/2022  Patient Name:  Adán Nguyen    :  1960  MRN: [id number]  Physician: Kamila Bryson MD (resident physician)  Gurdeep Perea MD (attending physician)                                  Insurance: [employer]. Auth required after 30 visits  Medical Diagnosis: M54.16 (ICD-10-CM) - Lumbar radiculopathy              Rehab Codes: M54.16  Onset Date: 2021 with recent exacerbation 2022                    Next 's appt: 7/15/22; NP at neurosurgeon 22   Visit# / total visits:  Cancels/No Shows: 2/0    Subjective:    8/15:Patient arrived noting her pain remains the same. Pain Radiating into L posterior thigh. States she sees surgeons this week to hopefully have more answers. Pain:  [x] Yes  [] No Location:(L) Lower back into buttock, (L) posterior/lateral thigh to knee; numbness in lower leg to ankle Pain Rating: (0-10 scale) 5-6/10  Pain altered Tx:  [x] No  [] Yes  Action:  Comments:     Objective:  Modalities:   Precautions:  Exercises:    [facility] Exercise Log  Aquatic, Hip & DLS Program- Phase 1    Date of Eval:                                Primary PT: Amslavalimartha Cordero, PT  Diagnosis:   Things to Focus On (goals):   Surgical Precautions:  Medical Precautions:  [] C-9 dates  [] Occ Med   [] Medicare       Date 7/21/22 7/26/22 7/28/22 8/1/22 8/11/22 8/15/22   Visit # 14/26 15/26 16/26 17/26 18/26 19/26   Walk F/L/R 2 Laps @ Rail 2 Laps @ Rail 2 Laps @ Rail 2 Laps @ Rail 2 Laps @ Rail 2 Laps @ [address] [place] [address] 12x 2 Laps @ Rail 2 Laps @ Rail 2 Laps @ [facility] @ [address] @ [address] 12x5\" 15x5\" 15x5\" 15x5\" 15x5\" 15x5\"   Step-Ups F/L    Low box  10x F  Low box  15x  Low box  15x    Step Down F/L         SLR F/L/R 12x 15x 15x 15x 15x 15x   Hip/Knee Flex/Ext 12x 15x 15x 15x 15x 15x   F/L Lunges                  Kickboard Ex. Small Small small Medium  Medium  Medium    Iso Abd.  12x5\" 15x5\" 15x5\" 15x5\" 15x5\" 15x5\"   Push-pull 12x 15x 15x 15x 15x 15x   Paddling                  UE Format: Chest deep Chest Deep Chest deep Chest deep  Chest deep  2 sm balls  Chest deep  2 sm balls   Horiz Abd/Add 10x 10x 15x 15x 15x 15x   IR/ER (wipers) 10x 10x 15x 15x 15x 15x   Alt Flex/Ext 10x 10x 15x 15x 15x 15x   Alt Press Down 10x 10x 15x 15x 15x 15x   Abd/Add 10x 10x 15x 15x 15x 15x            Deep Water: 1 Noodle 1 Noodle 1 Noodle 1 Noodle 1 Noodle 1 Noodle   Hang 5' 5' 5' 5' 5' 5'   Cycling 2' 2' 2' 2' 2' 2'   Jacks 2' 2' 2' 2' 2' 2'   X-Country 2' 2' 2' 2' 2' 2'            Balance         SLS                  Stretches         Achllies         Hamstring 3x30\" 3x30\" 3x30\" 3x30\" 3x30\" 3x30\"   Piriformis                  Breast Stroke  1 Lap 1 Lap 1 Lap 1 Lap 1 Lap   Pain Rating 5-6 4 6 4 4 5       Specific Instructions for next treatment:     Assessment: [x] Progressing toward goals. 8/15: Continued with most recent progressions with addition of lateral step ups today without increased pain symptoms. Educated patient on seated hamstring stretch to be completed daily at home to promote tissue extensibility and attempt to lessen radiating symptoms. Cues required for core engagement and scapular retraction during UE format to continue strengthening. [] No change. [] Other:    Skilled PT intervention is required to help centralize/modulate sx, improve pain free lumbar AROM and maximize neuromuscular strength & stability necessary for unrestricted ADL/ work tolerances at her prior level of function.      STG: (to be met in 18 treatments)  Pt will self report worst pain no greater than 3/10 in order to better tolerate ADLs/work activities with minimal dysfunction GOAL PROGRESSING AND EXTENDED THROUGH POC 8/1  Pt will improve lumbar AROM to 90% or greater in all planes in order to demonstrate ability to move/reach in all planes unrestricted at [facility] POC 8/1  Pt will improve trunk strength to 4/5 or greater in all planes to show improved stability at prior level GOAL PROGRESSING AND EXTENDED THROUGH POC 8/1  Pt will self report reduced peripheral L LE pain (no distal than L knee) to demonstrate initial  favorable centralization response to therapy GOAL PROGRESSING AND EXTENDED THROUGH POC 8/1 (SX PRIMARILY EXTEND TO KNEE, BUT OCCASIONALLY STILL RADIATE INTO LOWER LEG)  LTG: (to be met in 26 treatments)  Pt will demonstrate improved B LE strength to 4/5 or greater in order to demonstrate improved stability/strength necessary for unrestricted ADLs/work activities [facility] POC 8/1  Pt will self report ability to complete all work duties with pain no greater than 1/10 to demonstrate unrestricted functional tolerances at prior level GOAL PROGRESSING AND EXTENDED THROUGH POC 8/1  Pt will improve core strength Sahrmann Grade 2/5 or greater to demonstrate better support and stability to lumbar spine GOAL PROGRESSING AND EXTENDED THROUGH POC 8/1  Pt will decrease SHEY to 10% impaired or less in order to demonstrate improved functional tolerances at PLOF with minimal restriction/dysfunction GOAL PROGRESSING AND EXTENDED THROUGH POC 8/1  Pt will demonstrate independence with a long term HEP for continued progress/maintenance after completion of PT GOAL PROGRESSING AND EXTENDED THROUGH POC 8/1      Patient Goals/Rehab Expectations: To alleviate pain and get back to normal    Pt. Education:  [] Yes  [] No  [x] Reviewed Prior HEP/Ed  Method of Education: [x] Verbal  [x] Demo  [] Written  Comprehension of Education:   [x] Verbalizes understanding. [x] Demonstrates understanding. [] Needs review. [] Demonstrates/verbalizes HEP/Ed previously given. Plan: [x] Continue per plan of care.    [] Other:       Treatment Charges: Mins Units   []  Modalities     []  Ther Exercise     []  Manual Therapy     []  Ther Activities     [x]  Aquatics 56 4   []  Neuromuscular     [] Vasocompression     [] Gait Training     [] Dry needling        [] 1 or 2 muscles        [] 3 or more muscles     []  Other     Total Treatment time 56 4     Time In: 410 PM            Time Out: 506 PM    Electronically signed by:  Radha Butler PTA

## 2022-08-16 ENCOUNTER — HOSPITAL ENCOUNTER (OUTPATIENT)
Dept: OCCUPATIONAL THERAPY | Age: 62
Setting detail: THERAPIES SERIES
Discharge: HOME | End: 2022-08-16
Payer: COMMERCIAL

## 2022-08-16 PROCEDURE — 97110 THERAPEUTIC EXERCISES: CPT

## 2022-08-16 RX ORDER — CELECOXIB 100 MG/1
CAPSULE ORAL
Qty: 120 CAPSULE | Refills: 1 | Status: SHIPPED | OUTPATIENT
Start: 2022-08-16 | End: 2022-09-08

## 2022-08-16 NOTE — FLOWSHEET NOTE
fit/train     []  Orthotic recheck     []  Other     Total Treatment time 45 3       Assessment: [x] Progressing toward goals. Pt cont to display decreased strength in B hands and subjectively reports cont numbness and tingling. Began [address] cont activities of placing small pegs into a peg board. Introduced posture exercises of shoulder extension and scapular retractions. Changed ulnar nerve glides to \"okay\" sign to forehaed. Pt completed them w/out issue. Pt tolerated tx well today w/out complaint. [] No change. [] Other     [x] Patient would continue to benefit from skilled occupational therapy services in order to: Improve, Increase, and Restore  functional /grasp, Motor control/Tone in UE, Strength, Activity tolerance, and Coordination deficit in order to improve functional use of UE in ADL performance and return to PLOF     STG/LTG  Short Term Goals: (  8    Treatments)  Decrease numbness/tingling per subjective report   Increase strength (pounds);  strength by 5 lbs  Increase pinch strength by 3lbs  Pt will increase 9-hole pef test by 10 seconds on each UE  Increase function:UE Functional Index Score 8 or more points to promote increased functional abilities  Demo knowledge of fall prevention in 2 sessions. Patient to be independent with home exercise program as demonstrated by performance with correct form without cues. Long Term Goals: (  16  Treatments)  Pt will increase 9 hole peg test time by 15 seconds  Pt will improve UEFI score by 15 points  Pt will increase  strength by 10 lbs  Pt will increase pinch strength by 5 lbs    Pt. Education:  [x] Yes  [] No  [x] Reviewed Prior HEP/Ed  Method of Education: [x] Verbal  [x] Demo  [] Written  Re: Review HEP w/ pt   Comprehension of Education:  [x] Verbalizes understanding. [x] Demonstrates understanding. [] Needs review. [] Demonstrates/verbalizes HEP/Ed previously given.       Plan: [x] Continue current frequency toward short and long term

## 2022-08-16 NOTE — TELEPHONE ENCOUNTER
E-scribe request for med refills. Please review and e-scribe if applicable. Last Visit Date:  8/1/22  Next Visit Date:  8/23/2022    Hemoglobin A1C (%)   Date Value   05/26/2022 7.9   09/14/2021 7.7   06/24/2021 7.9 (H)             ( goal A1C is < 7)   Microalb/Crt.  Ratio (mcg/mg creat)   Date Value   06/11/2021 CANNOT BE CALCULATED     LDL Cholesterol (mg/dL)   Date Value   06/24/2021 52       (goal LDL is <100)   AST (U/L)   Date Value   05/31/2022 27     ALT (U/L)   Date Value   05/31/2022 36 (H)     BUN (mg/dL)   Date Value   07/06/2022 15     BP Readings from Last 3 Encounters:   08/01/22 134/80   07/29/22 130/80   07/06/22 136/85          (goal 120/80)        Patient Active Problem List:     Cervical spondylosis     Type II or unspecified type diabetes mellitus without mention of complication, not stated as uncontrolled     Hypertension     Abnormal auditory perception     Eustachian tube dysfunction     Chronic serous otitis media     Nasal polyps     Nasal congestion     Osteoarthritis of left knee     Osteoarthritis of right knee     DIEGO (nonalcoholic steatohepatitis)     Vitamin D deficiency     Iron deficiency anemia     Dyslipidemia     Diabetes mellitus type 2, uncontrolled (HCC)     Left hip pain     Trochanteric bursitis     Fatigue     Daytime somnolence     Snoring     Chronic left shoulder pain     Restless legs syndrome     Generalized anxiety disorder     Primary osteoarthritis, left shoulder     Tendinopathy of left shoulder     Adhesive capsulitis of left shoulder     Lumbar radiculopathy     Back pain     Toxic metabolic encephalopathy     Class 2 obesity in adult     Ulnar neuropathy of both upper extremities     Leg edema     Recurrent major depressive disorder (HCC)     Dermatitis     Retrolisthesis of vertebrae     Bilateral carpal tunnel syndrome      ----Parth Casey

## 2022-08-17 ENCOUNTER — TELEPHONE (OUTPATIENT)
Dept: FAMILY MEDICINE CLINIC | Age: 62
End: 2022-08-17

## 2022-08-17 NOTE — TELEPHONE ENCOUNTER
Pharmacy is requesting patients Celecoxib to be changed to 180 capsules for a 90 day supply.     Please advise

## 2022-08-18 ENCOUNTER — HOSPITAL ENCOUNTER (OUTPATIENT)
Dept: PHYSICAL THERAPY | Age: 62
Setting detail: THERAPIES SERIES
Discharge: HOME | End: 2022-08-18
Payer: COMMERCIAL

## 2022-08-18 ENCOUNTER — OFFICE VISIT (OUTPATIENT)
Dept: NEUROSURGERY | Age: 62
End: 2022-08-18
Payer: COMMERCIAL

## 2022-08-18 VITALS
BODY MASS INDEX: 34.78 KG/M2 | HEIGHT: 62 IN | OXYGEN SATURATION: 98 % | WEIGHT: 189 LBS | DIASTOLIC BLOOD PRESSURE: 74 MMHG | HEART RATE: 73 BPM | TEMPERATURE: 97.8 F | SYSTOLIC BLOOD PRESSURE: 138 MMHG

## 2022-08-18 DIAGNOSIS — G56.23 ULNAR NEUROPATHY OF BOTH UPPER EXTREMITIES: Primary | ICD-10-CM

## 2022-08-18 PROCEDURE — 99203 OFFICE O/P NEW LOW 30 MIN: CPT | Performed by: NURSE PRACTITIONER

## 2022-08-18 PROCEDURE — 97113 AQUATIC THERAPY/EXERCISES: CPT

## 2022-08-18 NOTE — PROGRESS NOTES
[address] [facility] NEUROSURGERY  [address] [facility], [address] [address]  Dept: [phone]    Patient:  Gayle Gonsalez  YOB: 1960  Date: 22    The patient is a 65 y.o. female who presents today for consult of the following problems:     Chief Complaint   Patient presents with    Back Pain     Lumbar Radiculopathy         HPI:     Gayle Gonsalez is a 65 y.o. female on whom neurosurgical consultation was requested by Koffi Warner MD for management of left leg pain. Has been having ongoing issues with painful numbness and tingling in the bilateral ulnar distribution. Has been undergoing aquatic therapy for her lower back and leg pain. Recently started occupational therapy to help with upper extremities. Does have upcoming EMG scheduled for . Symptoms started after she was in a forward leaning position on her elbows for prolonged period of time.      History:     Past Medical History:   Diagnosis Date    Cervical spondylosis 2009    c-4 c-5, c-5 c-6, c-6 c-7    Generalized anxiety disorder 2020    Hyperlipidemia     Hypertension     Lumbar radiculopathy     DIEGO (nonalcoholic steatohepatitis) 10/12/2014    Obesity     Osteoarthritis of left knee 2014    Recurrent major depressive disorder ([facility] [address] 7/15/2022    Restless legs syndrome     Type II or unspecified type diabetes mellitus without mention of complication, not stated as uncontrolled      Past Surgical History:   Procedure Laterality Date     SECTION      COLONOSCOPY  2012    [facility]    HYSTERECTOMY, TOTAL ABDOMINAL (CERVIX REMOVED)      Polymenorrhagia    SHOULDER SURGERY Left 2021     SHOULDER MANIPULATION WITH PRE OP INTERSCALENE BLOCK     SHOULDER SURGERY Left 2021    SHOULDER MANIPULATION WITH PRE OP INTERSCALENE BLOCK and intraop injection performed by Flavia Duran DO at [place] [address] GASTROINTESTINAL ENDOSCOPY  07-    [facility]     Family History   Problem Relation Age of Onset    Heart Attack Mother     Diabetes Mother     Diabetes Father     Heart Attack Father      Current Outpatient Medications on File Prior to Visit   Medication Sig Dispense Refill    celecoxib (CELEBREX) 100 MG capsule TAKE 1 CAPSULE BY MOUTH 2 TIMES A  capsule 1    ASPIRIN LOW DOSE 81 MG EC tablet TAKE 1 TABLET BY MOUTH ONE TIME A DAY 30 tablet 2    sertraline (ZOLOFT) 50 MG tablet TAKE ONE TABLET BY MOUTH EVERY MORNING 30 tablet 0    alendronate (FOSAMAX) 70 MG tablet Take 1 tablet by mouth every 7 days 12 tablet 1    busPIRone (BUSPAR) 10 MG tablet TAKE 1 TABLET BY MOUTH 3 TIMES A  tablet 0    oxyCODONE-acetaminophen (PERCOCET) 5-325 MG per tablet Take by mouth every 4 hours as needed for Pain. atorvastatin (LIPITOR) 40 MG tablet Take 1 tablet by mouth at bedtime 90 tablet 2    rOPINIRole (REQUIP) 1 MG tablet Take 2 tablets by mouth nightly 90 tablet 3    acetaminophen (TYLENOL 8 HOUR) 650 MG extended release tablet Take 1 tablet by mouth every 8 hours as needed for Pain 60 tablet 3    miconazole (ZEASORB-AF) 2 % powder Apply 43 g topically as needed for Itching Apply topically 2 times daily. 45 g 1    furosemide (LASIX) 20 MG tablet Take 0.5 tablets by mouth every other day 60 tablet 3    diclofenac sodium (VOLTAREN) 1 % GEL Apply 2 g topically in the morning and 2 g at noon and 2 g in the evening and 2 g before bedtime. 150 g 2    blood glucose test strips (PRODIGY NO CODING BLOOD GLUC) strip Use to test once daily and as needed as directed by provider. Please dispense Prodigy brand.  100 each 3    Magnesium Oxide 400 MG CAPS Take 400 mg by mouth daily 30 capsule 3    glipiZIDE (GLUCOTROL) 10 MG tablet Take 1 tab bid 793 tablet 1    TRULICITY 1.5 MH/9.6EI SOPN INJECT THE CONTENTS OF ONE SYRINGE UNDER THE SKIN ONCE WEEKLY 6 mL 2    SYNJARDY 5-1000 MG TABS TAKE 1 TABLET BY MOUTH 2 TIMES A DAY 180 tablet 2    omeprazole (PRILOSEC) 20 MG delayed release capsule Take 1 capsule by mouth 2 times daily 180 capsule 3    bimatoprost (LUMIGAN) 0.01 % SOLN ophthalmic drops Place 1 drop into both eyes nightly      PRODIGY LANCETS 28G MISC 1 Bottle by Does not apply route three times daily 100 each 3    HUMALOG 100 UNIT/ML SOLN injection vial       HUMALOG KWIKPEN 100 UNIT/ML SOPN       blood glucose monitor kit and supplies Dispense sufficient amount for indicated testing frequency plus additional to accommodate PRN testing needs. Dispense all needed supplies to include: monitor, strips, lancing device, lancets, control solutions, alcohol swabs. Prodigy Brand 1 kit 0    gabapentin (NEURONTIN) 300 MG capsule Take 1 capsule by mouth 3 times daily for 30 days. 90 capsule 0    albuterol sulfate HFA (PROVENTIL HFA) 108 (90 Base) MCG/ACT inhaler Inhale 2 puffs into the lungs every 4 hours as needed for Wheezing 2 Inhaler 5    [DISCONTINUED] repaglinide (PRANDIN) 1 MG tablet TAKE 1 TABLET BY MOUTH 3 TIMES A DAY BEFORE MEALS 90 tablet 2    Blood Glucose Monitoring Suppl (PRODIGY AUTOCODE BLOOD GLUCOSE) w/Device KIT 1 Device by Does not apply route 3 times daily 1 kit 0     No current facility-administered medications on file prior to visit. Social History     Tobacco Use    Smoking status: Never    Smokeless tobacco: Never   Vaping Use    Vaping Use: Never used   Substance Use Topics    Alcohol use: Yes     Alcohol/week: 0.0 standard drinks     Comment: rarely/ 4 times a year    Drug use: No       Allergies   Allergen Reactions    Codeine Nausea And Vomiting       Review of Systems  Constitutional: Negative for activity change and appetite change. HENT: Negative for ear pain and facial swelling. Eyes: Negative for discharge and itching. Respiratory: Negative for choking and chest tightness. Cardiovascular: Negative for chest pain and leg swelling. Gastrointestinal: Negative for nausea and abdominal pain. Endocrine: Negative for cold intolerance and heat intolerance. Genitourinary: Negative for frequency and flank pain. Musculoskeletal: Negative for myalgias and joint swelling. Skin: Negative for rash and wound. Allergic/Immunologic: Negative for environmental allergies and food allergies. Hematological: Negative for adenopathy. Does not bruise/bleed easily. Psychiatric/Behavioral: Negative for self-injury. The patient is not nervous/anxious. Physical Exam:      /74 (Site: Right Upper Arm)   Pulse 73   Temp 97.8 °F (36.6 °C)   Ht 5' 2\" (1.575 m)   Wt 189 lb (85.7 kg)   SpO2 98%   BMI 34.57 kg/m²   Estimated body mass index is 34.57 kg/m² as calculated from the following:    Height as of this encounter: 5' 2\" (1.575 m). Weight as of this encounter: 189 lb (85.7 kg). General:  Ashley Craig is a 65y.o. year old female who appears her stated age. HEENT: Normocephalic atraumatic. Neck supple. Chest: regular rate; pulses equal  Abdomen: Soft nontender nondistended.   Ext: DP and PT pulses 2+, good cap refill  Neuro    Mentation  Appropriate affect  Registration intact  Orientation intact  Judgement intact to situation    Cranial Nerves:   Pupils equal and reactive to light  Extraocular motion intact  Face and shrug symmetric  Tongue midline  No dysarthria  v1-3 sensation symmetric, masseter tone symmetric  Hearing symmetric    Sensation: Decreased bilateral ulnar distribution    Motor  L deltoid 5/5; R deltoid 5/5  L biceps 5/5; R biceps 5/5  L triceps 5/5; R triceps 5/5  L wrist extension 5/5; R wrist extension 5/5  L intrinsics 5/5; R intrinsics 5/5     Reflexes  L Brachioradialis 2+/4; R brachioradialis 2+/4  L Biceps 2+/4; R Biceps 2+/4  L Triceps 2+/4; R Triceps 2+/4  L Patellar 2+/4: R Patellar 2+/4  L Achilles 2+/4; R Achilles 2+/4    hoffmans L: neg  hoffmans R: neg  Clonus L: neg  Clonus R: neg  Babinski L: neg  Babinski R: neg    + Tinel's bilateral orders of the defined types were placed in this encounter. Electronically signed by ORLY Martinez CNP on 8/22/2022 at 12:14 PM    Please note that this chart was generated using voice recognition Dragon dictation software. Although every effort was made to ensure the accuracy of this automated transcription, some errors in transcription may have occurred.

## 2022-08-18 NOTE — FLOWSHEET NOTE
[facility] Outpatient Physical Therapy   [address] [facility] [address]   Phone: [phone]   Fax: [phone]    Physical Therapy Daily Treatment Note      Date:  2022  Patient Name:  Margarita Branham    :  1960  MRN: [id number]  Physician: Indigo Valdes MD (resident physician)  Aaron Stack MD (attending physician)                                  Insurance: [employer]. Auth required after 30 visits  Medical Diagnosis: M54.16 (ICD-10-CM) - Lumbar radiculopathy              Rehab Codes: M54.16  Onset Date: 2021 with recent exacerbation 2022                    Next 's appt: PCP 22; Neurosurgeon 22; neurology 10/2002  Visit# / total visits:  Cancels/No Shows: 2/0    Subjective:  Patient reports she was terminated from employment Friday due to budget cuts just shy of her 29 year anniversary. Arrives to therapy stating today is not such a good day. Reports she is not very active at home as she is fearful to re-injure herself. States she tries to walk around some, loads the washer with laundry but does not transfer it to the dryer due to it being too heavy. To get an \"elbow\" brace for ulnar nerve entrapment- saw neurosurgery today. Feels pool is helping overall with strength and endurance. Patient reports no new changes as far as lower back with neuro surgery visit this date. Reports she plans to contact the dr and inquire on any types of injections but states steroid in lower back from 2021 did not offer any relief.       Pain:  [x] Yes  [] No Location:(L) Lower back into buttock, (L) posterior/lateral thigh to knee; numbness in lower leg to ankle Pain Rating: (0-10 scale) 5/10  Pain altered Tx:  [x] No  [] Yes  Action:    Comments:     Objective:  Modalities:   Precautions:  Exercises:    [address] [facility] J Exercise Log  Aquatic, Hip & DLS Program- Phase 1    Date of Eval:                                Primary PT: Faby Garces, PT  Diagnosis: Things to Focus On (goals):   Surgical Precautions:  Medical Precautions:  [] C-9 dates  [] Occ Med   [] Medicare       Date 8/1/22 8/11/22 8/15/22 8/18/22    Visit # 17/26 18/26 19/26 20/26    Walk F/L/R 2 Laps @ Rail 2 Laps @ Rail 2 Laps @ Rail 2 Laps @ Rail    Marching 2 Laps @ Rail 2 Laps @ Rail 2 Laps @ Rail 2 Laps @ [id number]  [address] 15x5\" 15x5\" 15x5\" 15x5\"    Step-Ups F/L Low box  10x F  Low box  15x  Low box  15x  Low 15x    Step Down F/L        Heel/Toe Raise    7x    SLR F/L/R 15x 15x 15x 15x    Hip/Knee Flex/Ext 15x 15x 15x 15x    F/L Lunges     Add   Figure 8s                Kickboard Ex. Medium  Medium  Medium  Med    Iso Abd.  15x5\" 15x5\" 15x5\" 10x5\"    Push-pull 15x 15x 15x 15x    Paddling    10x            UE Format: Chest deep  Chest deep  2 sm balls  Chest deep  2 sm balls Chest Deep 2 Small Balls    Horiz Abd/Add 15x 15x 15x 15x    IR/ER (wipers) 15x 15x 15x 15x    Alt Flex/Ext 15x 15x 15x 15x    Alt Press Down 15x 15x 15x 15x    Abd/Add 15x 15x 15x 15x            Deep Water: 1 Noodle 1 Noodle 1 Noodle 1 Noodle    Hang 5' 5' 5' 5'    Cycling 2' 2' 2' 2'    Jacks 2' 2' 2' 2'    X-Country 2' 2' 2' 2'            Balance        SLS                Stretches        Achllies        Hamstring 3x30\" 3x30\" 3x30\" -    Piriformis                Breast Stroke 1 Lap 1 Lap 1 Lap -    Pain Rating 4 4 5 5        Specific Instructions for next treatment: Progress with forward lunges and increase WB with LE exercise to challenge LE strength and dynamic lumbar stability    Assessment: [] Progressing toward goals. [x] No change. Completed program to tolerance- requires extra time to complete program-slower paces/progressing with exercise. Added heel toe raises to tolerance- mild discomfort in lower back with (B) ankle DF- minimal reps performed. Added kickboard paddling to challenge core.  Discussed increasing activity level and walking at home with gradual progressions to assist with endurance and general strength. Reviewed proper technique with kickboard and UE format to improve core musculature activation. Limited time this date for stretching and breast stroke laps. [] Other:    Skilled PT intervention is required to help centralize/modulate sx, improve pain free lumbar AROM and maximize neuromuscular strength & stability necessary for unrestricted ADL/ work tolerances at her prior level of function.      STG: (to be met in 18 treatments)  Pt will self report worst pain no greater than 3/10 in order to better tolerate ADLs/work activities with minimal dysfunction GOAL PROGRESSING AND EXTENDED THROUGH POC 8/1  Pt will improve lumbar AROM to 90% or greater in all planes in order to demonstrate ability to move/reach in all planes unrestricted at [facility] POC 8/1  Pt will improve trunk strength to 4/5 or greater in all planes to show improved stability at prior level GOAL PROGRESSING AND EXTENDED THROUGH POC 8/1  Pt will self report reduced peripheral L LE pain (no distal than L knee) to demonstrate initial  favorable centralization response to therapy GOAL PROGRESSING AND EXTENDED THROUGH POC 8/1 (SX PRIMARILY EXTEND TO KNEE, BUT OCCASIONALLY STILL RADIATE INTO LOWER LEG)  LTG: (to be met in 26 treatments)  Pt will demonstrate improved B LE strength to 4/5 or greater in order to demonstrate improved stability/strength necessary for unrestricted ADLs/work activities GOAL PROGRESSING AND EXTENDED THROUGH POC 8/1  Pt will self report ability to complete all work duties with pain no greater than 1/10 to demonstrate unrestricted functional tolerances at prior level GOAL PROGRESSING AND EXTENDED THROUGH POC 8/1  Pt will improve core strength Sahrmann Grade 2/5 or greater to demonstrate better support and stability to lumbar spine GOAL PROGRESSING AND EXTENDED THROUGH POC 8/1  Pt will decrease SHEY to 10% impaired or less in order to demonstrate improved functional tolerances at PLOF with minimal restriction/dysfunction GOAL PROGRESSING AND EXTENDED THROUGH POC 8/1  Pt will demonstrate independence with a long term HEP for continued progress/maintenance after completion of PT GOAL PROGRESSING AND EXTENDED THROUGH POC 8/1      Patient Goals/Rehab Expectations: To alleviate pain and get back to normal    Pt. Education:  [] Yes  [] No  [x] Reviewed Prior HEP/Ed  Method of Education: [x] Verbal  [x] Demo  [] Written  Comprehension of Education:   [x] Verbalizes understanding. [x] Demonstrates understanding. [] Needs review. [] Demonstrates/verbalizes HEP/Ed previously given. Plan: [x] Continue per plan of care.    [] Other:       Treatment Charges: Mins Units   []  Modalities     []  Ther Exercise     []  Manual Therapy     []  Ther Activities     [x]  Aquatics 48 3   []  Neuromuscular     [] Vasocompression     [] Gait Training     [] Dry needling        [] 1 or 2 muscles        [] 3 or more muscles     []  Other     Total Treatment time 48 3     Time In: 897 PM            Time Out: 341 PM    Electronically signed by:  Haley Bob PTA

## 2022-08-19 ENCOUNTER — HOSPITAL ENCOUNTER (OUTPATIENT)
Dept: OCCUPATIONAL THERAPY | Age: 62
Setting detail: THERAPIES SERIES
Discharge: HOME | End: 2022-08-19
Payer: COMMERCIAL

## 2022-08-19 ENCOUNTER — CARE COORDINATION (OUTPATIENT)
Dept: OTHER | Facility: CLINIC | Age: 62
End: 2022-08-19

## 2022-08-19 PROCEDURE — 97110 THERAPEUTIC EXERCISES: CPT

## 2022-08-19 NOTE — CARE COORDINATION
Ambulatory Care Coordination Note  8/19/2022    ACC: Lila Louis, RN    Summary Note: ACM attempted to reach patient for follow up call regarding outcome of f/u appt with PCP and neurosurgery, PT progress, OT progress. HIPAA compliant message left requesting a return phone call at patients convenience. Will continue to follow.      Future Appointments   Date Time Provider Nicola Zaidi   8/19/2022  3:15 PM Carmen Zelaya, OT [facility] [place]   8/22/2022  2:30 PM Leane Darwin, OT [facility] [place]   8/22/2022  3:30 PM Laura Lane [facility] [place]   8/23/2022  3:00 PM Jonathan Garcia MD [facility]   8/25/2022  4:00 PM Nichelle Granados, OT [facility] [place]   8/25/2022  5:00 PM Patsy Medrano PTA [facility] [place]   9/15/2022  2:30 PM [facility]  [facility]   9/15/2022  2:30 PM Justin Cope MD [facility] med/reha [facility]   9/22/2022 10:30 AM Fredi Shen DO [place] [facility]   11/9/2022  2:00 PM Omkar Coleman MD Neuro [facility]

## 2022-08-19 NOTE — FLOWSHEET NOTE
[] [place]       Occupational Therapy            1st floor       [address] [facility]         Phone: [phone]       Fax: [phone] [] La[facility] Putnam Occupational Therapy  [address]  [facility]  Phone: [phone]  Fax: [phone]  [x] [facility] @ [facility]  [address] [facility] [address] [facility] [address]  [address] [facility] [place]  Phone [phone]  Fax [phone]      Occupational Therapy Daily Treatment Note    Date:  2022  Patient Name:  Dav Rausch    :  1960  MRN: [id number]  Referring Provider:  Stephanie Connelly MD  Insurance: Other [facility]  Medical Diagnosis: Retrolisthesis of vertebrae (M43.10) Bilateral Carpal Tunnel (G56.03)     Rehab Codes: pain in right upper arm M79.621,, pain in left upper arm M79.622, , lack of coordination unspecified R27.0,, or parasthesia of skin R20.2,  Onset Date: 22                 Next  [address]: 22  Visit# / total visits: ; Progress note for Medicare patient due at visit 8-9    Cancels/No Shows: 0/0      Subjective:    Pain:  [] Yes  [x] No Location: N/A Pain Rating: (0-10 scale) 0/10  Pain altered Tx:  [x] No  [] Yes  Action:  Pt Comments: Pt report \"feeling about the same\" \"no improvement\". Pt states HEP is going well.  Nerve glides are feeling some tension     Objective:  Modalities:  Precautions: N/A  Exercises:  Exrcise Reps/Time Weight/Level Comments   Tendon glide 10   Completed   Ulnar nerve glide 10   \"Okay\" to forehead  Completed   gripping 10 yellow Completed   Thumb opposition 10   Completed   Scapula retraction   2x10 Green   Completed    Shoulder extention 2x10   Green  Completed   Clip placing  Yellow/red/green/Blue Completed   [facility] 4 rows  Placing pegs in board   Rows 2x10 green completed   Other:      Treatment Charges: Mins Units   []  Modalities:      []  Ultrasound     [x]  Ther Exercise 45 3   []  Manual Therapy     []  Ther Activities     [] Orthotic fit/train     []  Orthotic recheck     []  Other     Total Treatment time 45 3       Assessment: [x] Progressing toward goals. Pt cont to display decreased strength in B hands and subjectively reports cont numbness and tingling. Began massage throughout ulnar nerve pathway. Cont [address] activities of placing small pegs into a peg board. Cont w/ posture exercises of shoulder extension and scapular retractions. Updated ulnar nerve glides to shoulder at 90 while flex/ext elbow and wrist, bilaterally. Pt completed them w/out issue. Pt tolerated tx well today w/out complaint. [] No change. [] Other     [x] Patient would continue to benefit from skilled occupational therapy services in order to: Improve, Increase, and Restore  functional /grasp, Motor control/Tone in UE, Strength, Activity tolerance, and Coordination deficit in order to improve functional use of UE in ADL performance and return to PLOF     STG/LTG  Short Term Goals: (  8    Treatments)  Decrease numbness/tingling per subjective report   Increase strength (pounds);  strength by 5 lbs  Increase pinch strength by 3lbs  Pt will increase 9-hole pef test by 10 seconds on each UE  Increase function:UE Functional Index Score 8 or more points to promote increased functional abilities  Demo knowledge of fall prevention in 2 sessions. Patient to be independent with home exercise program as demonstrated by performance with correct form without cues. Long Term Goals: (  16  Treatments)  Pt will increase 9 hole peg test time by 15 seconds  Pt will improve UEFI score by 15 points  Pt will increase  strength by 10 lbs  Pt will increase pinch strength by 5 lbs    Pt. Education:  [x] Yes  [] No  [x] Reviewed Prior HEP/Ed  Method of Education: [x] Verbal  [x] Demo  [] Written  Re: Review HEP w/ pt   Comprehension of Education:  [x] Verbalizes understanding. [x] Demonstrates understanding. [] Needs review.   [] Demonstrates/verbalizes HEP/Ed previously given. Plan: [x] Continue current frequency toward short and long term goals. [x] Specific Instructions for subsequent treatments: Cont w/ nerve glides and strengthening.    [] Other:       Time In: 6611 Time Out: 1600        Electronically signed by:  YAAKOV Cazares/CARMEN

## 2022-08-22 ENCOUNTER — HOSPITAL ENCOUNTER (OUTPATIENT)
Dept: OCCUPATIONAL THERAPY | Age: 62
Setting detail: THERAPIES SERIES
Discharge: HOME | End: 2022-08-22
Payer: COMMERCIAL

## 2022-08-22 ENCOUNTER — HOSPITAL ENCOUNTER (OUTPATIENT)
Dept: PHYSICAL THERAPY | Age: 62
Setting detail: THERAPIES SERIES
Discharge: HOME | End: 2022-08-22
Payer: COMMERCIAL

## 2022-08-22 DIAGNOSIS — F33.9 RECURRENT MAJOR DEPRESSIVE DISORDER, REMISSION STATUS UNSPECIFIED (HCC): ICD-10-CM

## 2022-08-22 PROCEDURE — 97110 THERAPEUTIC EXERCISES: CPT

## 2022-08-22 PROCEDURE — 97113 AQUATIC THERAPY/EXERCISES: CPT

## 2022-08-22 ASSESSMENT — PAIN DESCRIPTION - ORIENTATION: ORIENTATION: RIGHT;LEFT

## 2022-08-22 ASSESSMENT — PAIN DESCRIPTION - DESCRIPTORS: DESCRIPTORS: NUMBNESS

## 2022-08-22 ASSESSMENT — PAIN DESCRIPTION - LOCATION: LOCATION: HAND

## 2022-08-22 ASSESSMENT — PAIN SCALES - GENERAL: PAINLEVEL_OUTOF10: 0

## 2022-08-22 ASSESSMENT — PAIN DESCRIPTION - PAIN TYPE: TYPE: ACUTE PAIN

## 2022-08-22 NOTE — FLOWSHEET NOTE
[facility] Outpatient Physical Therapy   [address] [facility] [address]   Phone: [phone]   Fax: [phone]    Physical Therapy Daily Treatment Note      Date:  2022  Patient Name:  Ca Singh    :  1960  MRN: [id number]  Physician: Meghan Sofia MD (resident physician)  Florence Bean MD (attending physician)                                  Insurance: [employer]. Auth required after 30 visits  Medical Diagnosis: M54.16 (ICD-10-CM) - Lumbar radiculopathy              Rehab Codes: M54.16  Onset Date: 2021 with recent exacerbation 2022                    Next 's appt: PCP 22; Neurosurgeon 22; neurology 10/2002  Visit# / total visits:  Cancels/No Shows: 2/0    Subjective:  Feels left lower leg symptoms are less intense and more intermittent- seem to be relieved with sitting and aggravated with prolonged standing/walking      Pain:  [x] Yes  [] No Location:(L) Lower back into buttock, (L) posterior/lateral thigh to knee; numbness in lower leg to ankle Pain Rating: (0-10 scale) 4-5/10  Pain altered Tx:  [x] No  [] Yes  Action:    Comments:     Objective:  Modalities:   Precautions:  Exercises:    [address] [facility] Exercise Log  Aquatic, Hip & DLS Program- Phase 1    Date of Eval:                                Primary PT: Caron White, PT  Diagnosis: Things to Focus On (goals):   Surgical Precautions:  Medical Precautions:  [] C-9 dates  [] Occ Med   [] Medicare       Date 22    Visit #     Walk F/L/R 2 Laps @ Rail 2 Laps       (no rail)    Marching 2 Laps @ Rail 2 Laps      (no rail)      Low Box    Squats 15x5\" 10x5\"    Step-Ups F/L Low 15x Low 15x    Step Down F/L      Heel/Toe Raise 7x Floor 15x    SLR F/L/R 15x 15x    Hip/Knee Flex/Ext 15x 15x    F/L Lunges   Add   Figure 8s            Kickboard Ex.  Med Med    Iso Abd.  10x5\" 10x5\"    Push-pull 15x 15x    Paddling 10x 15x          UE Format: Chest Deep 2 Small Balls 2 Small Balls    Horiz Abd/Add 15x 15x    IR/ER (wipers) 15x 15x    Alt Flex/Ext 15x 15x    Alt Press Down 15x 15x    Abd/Add 15x 15x          Deep Water: 1 Noodle 1 Noodle    Hang 5' 5'    Cycling 2' 2    Jacks 2' 2'    X-Country 2' 2'          Balance      SLS            Stretches      Achllies      Hamstring - 2x30\"    Piriformis            Breast Stroke - 2 Laps    Pain Rating 5 4-5        Specific Instructions for next treatment: Progress with forward lunges     Assessment: [x] Progressing toward goals. Progressed WB with LE exercise to step level without issue. Patient able to perform walking this date without UE support and noted increased challenge to core stability and balance. Overall patient tolerated well without increased pain complaints     [] No change. [] Other:    Skilled PT intervention is required to help centralize/modulate sx, improve pain free lumbar AROM and maximize neuromuscular strength & stability necessary for unrestricted ADL/ work tolerances at her prior level of function.      STG: (to be met in 18 treatments)  Pt will self report worst pain no greater than 3/10 in order to better tolerate ADLs/work activities with minimal dysfunction GOAL PROGRESSING AND EXTENDED THROUGH POC 8/1  Pt will improve lumbar AROM to 90% or greater in all planes in order to demonstrate ability to move/reach in all planes unrestricted at [facility] POC 8/1  Pt will improve trunk strength to 4/5 or greater in all planes to show improved stability at prior level GOAL PROGRESSING AND EXTENDED THROUGH POC 8/1  Pt will self report reduced peripheral L LE pain (no distal than L knee) to demonstrate initial  favorable centralization response to therapy GOAL PROGRESSING AND EXTENDED THROUGH POC 8/1 (SX PRIMARILY EXTEND TO KNEE, BUT OCCASIONALLY STILL RADIATE INTO LOWER LEG)  LTG: (to be met in 26 treatments)  Pt will demonstrate improved B LE strength to 4/5 or greater in order to demonstrate improved stability/strength necessary for unrestricted ADLs/work activities [address] [facility] POC 8/1  Pt will self report ability to complete all work duties with pain no greater than 1/10 to demonstrate unrestricted functional tolerances at prior level [address] POC 8/1  Pt will improve core strength Sahrmann Grade 2/5 or greater to demonstrate better support and stability to lumbar spine GOAL PROGRESSING AND EXTENDED THROUGH POC 8/1  Pt will decrease SHEY to 10% impaired or less in order to demonstrate improved functional tolerances at PLOF with minimal restriction/dysfunction GOAL PROGRESSING AND EXTENDED THROUGH POC 8/1  Pt will demonstrate independence with a long term HEP for continued progress/maintenance after completion of PT GOAL PROGRESSING AND EXTENDED THROUGH POC 8/1      Patient Goals/Rehab Expectations: To alleviate pain and get back to normal    Pt. Education:  [] Yes  [] No  [x] Reviewed Prior HEP/Ed  Method of Education: [x] Verbal  [x] Demo  [] Written  Comprehension of Education:   [x] Verbalizes understanding. [x] Demonstrates understanding. [] Needs review. [] Demonstrates/verbalizes HEP/Ed previously given. Plan: [x] Continue per plan of care.    [] Other:       Treatment Charges: Mins Units   []  Modalities     []  Ther Exercise     []  Manual Therapy     []  Ther Activities     [x]  Aquatics 50 3   []  Neuromuscular     [] Vasocompression     [] Gait Training     [] Dry needling        [] 1 or 2 muscles        [] 3 or more muscles     []  Other     Total Treatment time 50 3     Time In: 824 PM            Time Out: 598 PM    Electronically signed by:  Nicholas Rogers PTA

## 2022-08-22 NOTE — PROGRESS NOTES
[address]         Phone: [phone]       Fax: [phone]     Occupational Therapy Daily Treatment note     Occupational Therapy: Daily Note   Patient: Meron Stanley (89 y.o. female)   Examination Date: 2022  No data recorded  No data recorded   :  1960 # of Visits since [facility]:   Visit count could not be calculated. Make sure you are using a visit which is associated with an episode. MRN: [id number]  CSN: [id number] Start of Care Date:2022   Insurance: Payor: Vitor Dean / Plan: [employer] [address] / Product Type: *No Product type* /   Insurance ID: [id number] - ([employer]) Secondary Insurance (if applicable): Insurance Information: [address] [place] [address]   Referring Physician: MD Bryan Arlelano MD   PCP: Abe Dickinson MD Visits to Date/Visits Approved:     No Show/Cancelled Appts:   /       Medical Diagnosis: Radiculopathy, lumbar region [M54.16] Retrolisthesis of vertebrae (M43.10) Bilateral Carpal Tunnel (G56.03)  Treatment Diagnosis: pain in right upper arm M79.621,, pain in left upper arm M79.622, , lack of coordination unspecified R27.0,, or parasthesia of skin R20.2,        SUBJECTIVE EXAMINATION   Pain Level: Pain Screening  Patient Currently in Pain: Yes  Pain Assessment: 0-10  Pain Level: 0  Post Treatment Pain Level: 1 (lt hand/forearm)  Patient's Stated Pain Goal: 0 - No pain  Pain Type: Acute pain  Pain Location: Hand  Pain Orientation: Right, Left  Pain Descriptors: Numbness    Patient Comments: Subjective: Pt states that her lt hand is numb , rt ring/little finger numb. Pt states on saturday she helped with food drive and she held clipboard for about 2 hrs. Pt states later that day her hands were stiff & she had difficulty doing opposition & her nerve glides.   Pt states  she is using a different type pen & her hand writing is getting better. OOT toold pt that a wider base pen is better for grasping. Pt states they are downsizing her job the 1st week in Sept 2022. Pt states she will have to reschedule her next OT/PT appt d/t having to take her daughter to an appointment. HEP Compliance: Good        OBJECTIVE EXAMINATION     TREATMENT     Exercises:     Treatment Reasoning    OT Exercise 1: Massage bilateral hands/forearms, PROM/stretching bilateral wrist/fingers. bilateral hand tendon gliding /finger blocking exercises: PIPs flexion/extension 3 sets 10x (OT stabilzed proximal phalanxes ), DIPs flex/extension 2 sets 10x (OT stabilzed proximal & middle phalanxes). OT Exercise 2: Green theratubing: bilateral scapula retraction 2 sets of 10x,bilateral shoulder extension 2 sets of 10x  OT Exercise 3: small colored pegs: using rt hand place 4 rows of 10 pegs each in board, then remove pegs & toss in container, using lt hand place 4 rows of 10 pers each in board, then remove pegs & toss in container. Limitations addressed: Coordination, Flexibility, Strength  Limitations addressed: Bilateral hand weakness,impaired fine motor coordination  Therapist provided: Verbal cuing  Progressed: Progressed with tendon gliding /finger blocking exercises. ASSESSMENT     Assessment: Assessment: Pt states she was doing ulnar nerve glides at home. OT tx focus on massage & PROM to decrease bilateral muscle tightness forearms,hands. Finger blocking exercises to facilitate muscle motion fingers inervated by the ulnar nerve. During massage forearm ulnar side & PROM bilateral wrist into extension pt reported some discomfort & a little pain. Pt completes bilateral UE strengthening & fine motor exercises. OT had pt to toss small pegs into container after removing from board to work ondexterity & finger extension. Pt states it wasn't easy tossing peg into container & her hands got tired/fatigued. See OT exercises for detail.   Performance deficits / Impairments: Decreased coordination, Decreased sensation, Decreased strength, Decreased high-level IADLs    Post-Treatment Pain Level: 1 (lt hand/forearm)    Prognosis: Good    Activity Tolerance: Patient limited by endurance (Pt's hands fatigued)               TREATMENT PLAN   REQUIRES OT FOLLOW-UP: Yes  Type: Outpatient  Plan  Plan Frequency: 2x/week  Plan Weeks: LT goals for 16 txs  Current Treatment Recommendations: Strengthening, Coordination training, Patient/Caregiver education & training, ROM (Sensory/nerve glides)       Therapy Time  Individual Time In: 9155  Individual Time Out: 7459  Minutes: 44  Timed Code Treatment Minutes: 44 Minutes       Electronically signed by Dario Burkett OT  on 8/22/2022 at 6:16 PM       Treatment Charges: Mins Units Time In/Out   [] Evaluation       []  Low       []  Moderate       []  High      []  Electrical Stim      []  Iontophoresis      []  Paraffin      []  Ultrasound        []  Masage      []  Neuromuscular Re-ed      []  ADL      [x]  Ther Exercise 44 3    []  Ther Activities      []  Neuro Re-Ed      []  Splinting      []  Other      Total Treatment time 44 min 3          POC NOTE

## 2022-08-23 RX ORDER — CELECOXIB 100 MG/1
CAPSULE ORAL
Qty: 120 CAPSULE | Refills: 1 | OUTPATIENT
Start: 2022-08-23

## 2022-08-23 RX ORDER — ASPIRIN 81 MG/1
TABLET, COATED ORAL
Qty: 30 TABLET | Refills: 2 | OUTPATIENT
Start: 2022-08-23

## 2022-08-23 RX ORDER — BUSPIRONE HYDROCHLORIDE 10 MG/1
TABLET ORAL
Qty: 180 TABLET | Refills: 0 | Status: SHIPPED | OUTPATIENT
Start: 2022-08-23

## 2022-08-23 NOTE — TELEPHONE ENCOUNTER
E-scribe request for med refill. Please review and e-scribe if applicable. Last Visit Date:  08/01/2022  Next Visit Date:  8/22/2022    Hemoglobin A1C (%)   Date Value   05/26/2022 7.9   09/14/2021 7.7   06/24/2021 7.9 (H)             ( goal A1C is < 7)   Microalb/Crt.  Ratio (mcg/mg creat)   Date Value   06/11/2021 CANNOT BE CALCULATED     LDL Cholesterol (mg/dL)   Date Value   06/24/2021 52       (goal LDL is <100)   AST (U/L)   Date Value   05/31/2022 27     ALT (U/L)   Date Value   05/31/2022 36 (H)     BUN (mg/dL)   Date Value   07/06/2022 15     BP Readings from Last 3 Encounters:   08/18/22 138/74   08/01/22 134/80   07/29/22 130/80          (goal 120/80)        Patient Active Problem List:     Cervical spondylosis     Type II or unspecified type diabetes mellitus without mention of complication, not stated as uncontrolled     Hypertension     Abnormal auditory perception     Eustachian tube dysfunction     Chronic serous otitis media     Nasal polyps     Nasal congestion     Osteoarthritis of left knee     Osteoarthritis of right knee     DIEGO (nonalcoholic steatohepatitis)     Vitamin D deficiency     Iron deficiency anemia     Dyslipidemia     Diabetes mellitus type 2, uncontrolled (HCC)     Left hip pain     Trochanteric bursitis     Fatigue     Daytime somnolence     Snoring     Chronic left shoulder pain     Restless legs syndrome     Generalized anxiety disorder     Primary osteoarthritis, left shoulder     Tendinopathy of left shoulder     Adhesive capsulitis of left shoulder     Lumbar radiculopathy     Back pain     Toxic metabolic encephalopathy     Class 2 obesity in adult     Ulnar neuropathy of both upper extremities     Leg edema     Recurrent major depressive disorder (HCC)     Dermatitis     Retrolisthesis of vertebrae     Bilateral carpal tunnel syndrome      ----Haris Green

## 2022-08-23 NOTE — TELEPHONE ENCOUNTER
E-scribe request for med refills. Please review and e-scribe if applicable. Last Visit Date:  08/01/2022  Next Visit Date:  8/22/2022    Hemoglobin A1C (%)   Date Value   05/26/2022 7.9   09/14/2021 7.7   06/24/2021 7.9 (H)             ( goal A1C is < 7)   Microalb/Crt.  Ratio (mcg/mg creat)   Date Value   06/11/2021 CANNOT BE CALCULATED     LDL Cholesterol (mg/dL)   Date Value   06/24/2021 52       (goal LDL is <100)   AST (U/L)   Date Value   05/31/2022 27     ALT (U/L)   Date Value   05/31/2022 36 (H)     BUN (mg/dL)   Date Value   07/06/2022 15     BP Readings from Last 3 Encounters:   08/18/22 138/74   08/01/22 134/80   07/29/22 130/80          (goal 120/80)        Patient Active Problem List:     Cervical spondylosis     Type II or unspecified type diabetes mellitus without mention of complication, not stated as uncontrolled     Hypertension     Abnormal auditory perception     Eustachian tube dysfunction     Chronic serous otitis media     Nasal polyps     Nasal congestion     Osteoarthritis of left knee     Osteoarthritis of right knee     DIEGO (nonalcoholic steatohepatitis)     Vitamin D deficiency     Iron deficiency anemia     Dyslipidemia     Diabetes mellitus type 2, uncontrolled (HCC)     Left hip pain     Trochanteric bursitis     Fatigue     Daytime somnolence     Snoring     Chronic left shoulder pain     Restless legs syndrome     Generalized anxiety disorder     Primary osteoarthritis, left shoulder     Tendinopathy of left shoulder     Adhesive capsulitis of left shoulder     Lumbar radiculopathy     Back pain     Toxic metabolic encephalopathy     Class 2 obesity in adult     Ulnar neuropathy of both upper extremities     Leg edema     Recurrent major depressive disorder (HCC)     Dermatitis     Retrolisthesis of vertebrae     Bilateral carpal tunnel syndrome      ----Heather Anguiano

## 2022-08-24 ENCOUNTER — HOSPITAL ENCOUNTER (OUTPATIENT)
Dept: OCCUPATIONAL THERAPY | Age: 62
Setting detail: THERAPIES SERIES
Discharge: HOME | End: 2022-08-24
Payer: COMMERCIAL

## 2022-08-24 ENCOUNTER — HOSPITAL ENCOUNTER (OUTPATIENT)
Dept: PHYSICAL THERAPY | Age: 62
Setting detail: THERAPIES SERIES
Discharge: HOME | End: 2022-08-24
Payer: COMMERCIAL

## 2022-08-24 PROCEDURE — 97113 AQUATIC THERAPY/EXERCISES: CPT

## 2022-08-24 PROCEDURE — 97110 THERAPEUTIC EXERCISES: CPT

## 2022-08-24 ASSESSMENT — PAIN DESCRIPTION - ORIENTATION: ORIENTATION: LEFT;RIGHT

## 2022-08-24 ASSESSMENT — PAIN DESCRIPTION - FREQUENCY: FREQUENCY: CONTINUOUS

## 2022-08-24 ASSESSMENT — PAIN DESCRIPTION - DESCRIPTORS: DESCRIPTORS: NUMBNESS;SORE

## 2022-08-24 ASSESSMENT — PAIN DESCRIPTION - PAIN TYPE: TYPE: ACUTE PAIN

## 2022-08-24 ASSESSMENT — PAIN DESCRIPTION - LOCATION: LOCATION: HAND

## 2022-08-24 ASSESSMENT — PAIN SCALES - GENERAL: PAINLEVEL_OUTOF10: 1

## 2022-08-24 NOTE — FLOWSHEET NOTE
[facility] Outpatient Physical Therapy   [address] [address]   Phone: [phone]   Fax: [phone]    Physical Therapy Daily Treatment Note      Date:  2022  Patient Name:  Michelle Mathews    :  1960  MRN: [id number]  Physician: José Lua MD (resident physician)  Parker Wilkinson MD (attending physician)                                  Insurance: [employer]. Auth required after 30 visits  Medical Diagnosis: M54.16 (ICD-10-CM) - Lumbar radiculopathy              Rehab Codes: M54.16  Onset Date: 2021 with recent exacerbation 2022                    Next 's appt: PCP 22; Neurosurgeon 22; neurology 10/2002  Visit# / total visits:  Cancels/No Shows: 2/0    Subjective:  Noted a little muscle soreness after last visit but tolerable. States B hands are N/T after OT. Pain:  [x] Yes  [] No Location:(L) Lower back into buttock, (L) posterior/lateral thigh to knee; numbness in lower leg to ankle Pain Rating: (0-10 scale) 4/10  Pain altered Tx:  [x] No  [] Yes  Action:    Comments:     Objective:  Modalities:   Precautions:  Exercises:    [address] [facility] Exercise Log  Aquatic, Hip & DLS Program- Phase 1    Date of Eval:                                Primary PT: Sourav Solis, PT  Diagnosis: Things to Focus On (goals):   Surgical Precautions:  Medical Precautions:  [] C-9 dates  [] Occ Med   [] Medicare       Date 22    Visit #     Walk F/L/R 2 Laps @ Rail 2 Laps       (no rail) 2 Laps      (no rail)    Marching 2 Laps @ Rail 2 Laps      (no rail) 2 Laps      (no rail)      Low Box Low Box    Squats 15x5\" 10x5\" 10x5\"    Step-Ups F/L Low 15x Low 15x Low 10x    Step Down F/L       Heel/Toe Raise 7x Floor 15x Floor 10x    SLR F/L/R 15x 15x 10x    Hip/Knee Flex/Ext 15x 15x 10x    F/L Lunges   Low 10x    Figure 8s    Add          Kickboard Ex.  Med Med Med    Iso Abd.  10x5\" 10x5\" 10x5\"    Push-pull 15x 15x 15x    Paddling 10x 15x 15x           UE Format: Chest Deep 2 Small Balls 2 Small Balls 2 Small Balls    Horiz Abd/Add 15x 15x 15x    IR/ER (wipers) 15x 15x 15x    Alt Flex/Ext 15x 15x 15x    Alt Press Down 15x 15x 15x    Abd/Add 15x 15x 15x           Deep Water: 1 Noodle 1 Noodle 1 Noodle    Hang 5' 5' 5'    Cycling 2' 2 2'    Jacks 2' 2' 2'    X-Country 2' 2' 2'           Balance       SLS              Stretches       Achllies       Hamstring - 2x30\" 2x30\"    Piriformis              Breast Stroke - 2 Laps 2 Laps    Pain Rating 5 4-5 4        Specific Instructions for next treatment: Increase reps and speed to build trunk stability and to assist with balance    Assessment: [x] Progressing toward goals. Continued with LE program with increased WB without issue this date. Added forward lunges- patient noted LE fatigue after completing. Continued to challenge balance and core stability with less UE support during LE exercise and no UE support with walking- patient performs slower paced with slightly wider base of support     [] No change. [] Other:    Skilled PT intervention is required to help centralize/modulate sx, improve pain free lumbar AROM and maximize neuromuscular strength & stability necessary for unrestricted ADL/ work tolerances at her prior level of function.      STG: (to be met in 18 treatments)  Pt will self report worst pain no greater than 3/10 in order to better tolerate ADLs/work activities with minimal dysfunction GOAL PROGRESSING AND EXTENDED THROUGH POC 8/1  Pt will improve lumbar AROM to 90% or greater in all planes in order to demonstrate ability to move/reach in all planes unrestricted at [facility] POC 8/1  Pt will improve trunk strength to 4/5 or greater in all planes to show improved stability at prior level GOAL PROGRESSING AND EXTENDED THROUGH POC 8/1  Pt will self report reduced peripheral L LE pain (no distal than L knee) to demonstrate initial  favorable centralization response to therapy GOAL PROGRESSING AND EXTENDED THROUGH POC 8/1 (SX PRIMARILY EXTEND TO KNEE, BUT OCCASIONALLY STILL RADIATE INTO LOWER LEG)  LTG: (to be met in 26 treatments)  Pt will demonstrate improved B LE strength to 4/5 or greater in order to demonstrate improved stability/strength necessary for unrestricted ADLs/work activities [address] [facility] POC 8/1  Pt will self report ability to complete all work duties with pain no greater than 1/10 to demonstrate unrestricted functional tolerances at prior level [address] [facility] POC 8/1  Pt will improve core strength Sahrmann Grade 2/5 or greater to demonstrate better support and stability to lumbar spine GOAL PROGRESSING AND EXTENDED THROUGH POC 8/1  Pt will decrease SHEY to 10% impaired or less in order to demonstrate improved functional tolerances at PLOF with minimal restriction/dysfunction GOAL PROGRESSING AND EXTENDED THROUGH POC 8/1  Pt will demonstrate independence with a long term HEP for continued progress/maintenance after completion of PT GOAL PROGRESSING AND EXTENDED THROUGH POC 8/1      Patient Goals/Rehab Expectations: To alleviate pain and get back to normal    Pt. Education:  [] Yes  [] No  [x] Reviewed Prior HEP/Ed  Method of Education: [x] Verbal  [x] Demo  [] Written  Comprehension of Education:   [x] Verbalizes understanding. [x] Demonstrates understanding. [] Needs review. [] Demonstrates/verbalizes HEP/Ed previously given. Plan: [x] Continue per plan of care.    [] Other:       Treatment Charges: Mins Units   []  Modalities     []  Ther Exercise     []  Manual Therapy     []  Ther Activities     [x]  Aquatics 54 4   []  Neuromuscular     [] Vasocompression     [] Gait Training     [] Dry needling        [] 1 or 2 muscles        [] 3 or more muscles     []  Other     Total Treatment time 54 4     Time In: 202 PM            Time Out: 368

## 2022-08-24 NOTE — PROGRESS NOTES
[address]         Phone: [phone]       Fax: [phone]     Occupational Therapy Daily Treatment note     Occupational Therapy: Daily Note   Patient: Arcelia Salcedo (40 y.o. female)   Examination Date: 2022  No data recorded  No data recorded   :  1960 # of Visits since [facility]:   Visit count could not be calculated. Make sure you are using a visit which is associated with an episode. MRN: [id number]  CSN: [id number] Start of Care Date: 2022   Insurance: Payor: Joyce Harry / Plan: [employer] [address] / Product Type: *No Product type* /   Insurance ID: [id number] - ([employer]) Secondary Insurance (if applicable): Insurance Information: [facility]   Referring Physician: MD Karen Helms MD   PCP: Bam Pierson MD Visits to Date/Visits Approved:     No Show/Cancelled Appts:   /       Medical Diagnosis: Radiculopathy, lumbar region [M54.16] Retrolisthesis of vertebrae (M43.10) Bilateral Carpal Tunnel (G56.03)  Treatment Diagnosis: pain in right upper arm M79.621,, pain in left upper arm M79.622, , lack of coordination unspecified R27.0,, or parasthesia of skin R20.2,        SUBJECTIVE EXAMINATION   Pain Level: Pain Screening  Patient Currently in Pain: Yes  Pain Assessment: 0-10  Pain Level: 1  Post Treatment Pain Level: 1  Patient's Stated Pain Goal: 0 - No pain  Pain Type: Acute pain  Pain Location: Hand  Pain Orientation: Left, Right  Pain Descriptors: Numbness, Sore  Pain Frequency: Continuous    Patient Comments: Subjective: Pt states she has some brain fog today. Pt reports decrease sleep last night. HEP Compliance: Good        OBJECTIVE EXAMINATION       TREATMENT     Exercises:     Treatment Reasoning    OT Exercise 1: Massage bilateral hands/forearms, PROM/stretching bilateral wrist/fingers.  bilateral hand tendon gliding /finger blocking exercises: PIPs flexion/extension 3 sets 10x (OT stabilzed proximal phalanxes ), DIPs flex/extension 2 sets 10x (OT stabilzed proximal & middle phalanxes). manual resistance( bilateral hand/wrist flexion 10x, bilateral hand/wrist extension 10x)  OT Exercise 2: Green theratubing: bilateral scapula retraction 2 sets of 10x,bilateral shoulder extension 2 sets of 10x, PNF diagonal rt UE going from rt shoulder to lt hip 10x , PNF diagonal lt UE going from lt shoudler to rt hip  OT Exercise 3: small colored pegs: using rt hand place 4 rows of 10 pegs each in board, then remove pegs & toss in container, using lt hand place 4 rows of 10 pegs each in board, then remove pegs & toss in container. OT Exercise 4: graded clothespins 6 yellow 1#, 6 red 2#, 6 green 4#, 6 blue 8#  -using rt hand place/remove,  6 yellow, 6 red , 4 green using lt hand place/remove  OT Exercise 5: yellow 1.5# digiflex : bilateral hand gripping 10x, bilateral  hand each finger opposition pinch 10x    Limitations addressed: Coordination, Flexibility, Strength  Limitations addressed: Bilateral hand weakness,impaired fine motor coordination  Therapist provided: Verbal cuing  Progressed: Progressed to manual resistance bilateral wrist /hands. PNF shoulder reaching using theratubing. Begun digiflex exercise to improve bilateral hand  & pinch strength. ASSESSMENT     Assessment: Assessment: OT tx focus on massage,tendon gliding & PROM bilateral hand/wrist/forearm,Pt states she did her ulnar nerve glides  at home. OT progressed pt with bilateral hand strengthening using digiflex. Pt has difficulty pinching with bilateral little fingers using digiflex. Pt states her hands felt weak during bessie manual resistance exercise where OT applied light resistance. PNF theratubing exercises added for shoulder stability & strength.   Pt reports increase difficulty pinching green clothespins today -only able to do 4 & not able to do blue using lt hand. Pt reports increase numbness bilateral hands at end of tx. See OT exercises for detail.   Performance deficits / Impairments: Decreased coordination, Decreased sensation, Decreased strength, Decreased high-level IADLs    Post-Treatment Pain Level: 1    Prognosis: Good    Activity Tolerance: Patient limited by endurance (Pt's hands fatigued)                    TREATMENT PLAN   REQUIRES OT FOLLOW-UP: Yes  Type: Outpatient  Plan  Plan Frequency: 2x/week  Plan Weeks: LT goals for 16 visits  Current Treatment Recommendations: Strengthening, Coordination training, Patient/Caregiver education & training, ROM (sensory /nerve glides)  Plan Comment: continue OT       Therapy Time  Individual Time In: 1303  Individual Time Out: 9479  Minutes: 45  Timed Code Treatment Minutes: 45 Minutes       Electronically signed by Coby Figueredo OT  on 8/24/2022 at 4:18 PM       Treatment Charges: Mins Units Time In/Out   [] Evaluation       []  Low       []  Moderate       []  High      []  Electrical Stim      []  Iontophoresis      []  Paraffin      []  Ultrasound        []  Masage      []  Neuromuscular Re-ed      []  ADL      [x]  Ther Exercise 45 3    []  Ther Activities      []  Neuro Re-Ed      []  Splinting      []  Other      Total Treatment time 45 min 3          POC NOTE

## 2022-08-25 ENCOUNTER — CARE COORDINATION (OUTPATIENT)
Dept: OTHER | Facility: CLINIC | Age: 62
End: 2022-08-25

## 2022-08-25 ENCOUNTER — OFFICE VISIT (OUTPATIENT)
Dept: FAMILY MEDICINE CLINIC | Age: 62
End: 2022-08-25
Payer: COMMERCIAL

## 2022-08-25 ENCOUNTER — APPOINTMENT (OUTPATIENT)
Dept: OCCUPATIONAL THERAPY | Age: 62
End: 2022-08-25
Payer: COMMERCIAL

## 2022-08-25 ENCOUNTER — APPOINTMENT (OUTPATIENT)
Dept: PHYSICAL THERAPY | Age: 62
End: 2022-08-25
Payer: COMMERCIAL

## 2022-08-25 VITALS
WEIGHT: 188.8 LBS | HEART RATE: 76 BPM | BODY MASS INDEX: 34.74 KG/M2 | TEMPERATURE: 96.9 F | HEIGHT: 62 IN | DIASTOLIC BLOOD PRESSURE: 84 MMHG | SYSTOLIC BLOOD PRESSURE: 143 MMHG

## 2022-08-25 DIAGNOSIS — E11.9 TYPE 2 DIABETES MELLITUS WITHOUT COMPLICATION, WITHOUT LONG-TERM CURRENT USE OF INSULIN (HCC): Primary | ICD-10-CM

## 2022-08-25 DIAGNOSIS — Z00.00 HEALTHCARE MAINTENANCE: ICD-10-CM

## 2022-08-25 DIAGNOSIS — G56.23 ULNAR NEUROPATHY OF BOTH UPPER EXTREMITIES: ICD-10-CM

## 2022-08-25 DIAGNOSIS — I10 PRIMARY HYPERTENSION: ICD-10-CM

## 2022-08-25 LAB — HBA1C MFR BLD: 6.9 %

## 2022-08-25 PROCEDURE — 99214 OFFICE O/P EST MOD 30 MIN: CPT | Performed by: STUDENT IN AN ORGANIZED HEALTH CARE EDUCATION/TRAINING PROGRAM

## 2022-08-25 PROCEDURE — 90715 TDAP VACCINE 7 YRS/> IM: CPT | Performed by: FAMILY MEDICINE

## 2022-08-25 PROCEDURE — 90677 PCV20 VACCINE IM: CPT | Performed by: FAMILY MEDICINE

## 2022-08-25 PROCEDURE — 3044F HG A1C LEVEL LT 7.0%: CPT | Performed by: STUDENT IN AN ORGANIZED HEALTH CARE EDUCATION/TRAINING PROGRAM

## 2022-08-25 PROCEDURE — 83036 HEMOGLOBIN GLYCOSYLATED A1C: CPT | Performed by: STUDENT IN AN ORGANIZED HEALTH CARE EDUCATION/TRAINING PROGRAM

## 2022-08-25 RX ORDER — LISINOPRIL 40 MG/1
40 TABLET ORAL DAILY
Qty: 30 TABLET | Refills: 2 | Status: SHIPPED | OUTPATIENT
Start: 2022-08-25

## 2022-08-25 NOTE — PROGRESS NOTES
Attending Physician Statement  I  have discussed the care of Char Lopez including pertinent history and exam findings with the resident. I agree with the assessment, plan and orders as documented by the resident. BP (!) 143/84 Comment: machine  Pulse 76   Temp 96.9 °F (36.1 °C) (Temporal)   Ht 5' 2.01\" (1.575 m)   Wt 188 lb 12.8 oz (85.6 kg)   BMI 34.52 kg/m²    BP Readings from Last 3 Encounters:   08/25/22 (!) 143/84   08/18/22 138/74   08/01/22 134/80     Wt Readings from Last 3 Encounters:   08/25/22 188 lb 12.8 oz (85.6 kg)   08/18/22 189 lb (85.7 kg)   08/01/22 191 lb 9.6 oz (86.9 kg)          Diagnosis Orders   1. Type 2 diabetes mellitus without complication, without long-term current use of insulin (HCC)  POCT glycosylated hemoglobin (Hb A1C)    lisinopril (PRINIVIL;ZESTRIL) 40 MG tablet    Microalbumin, Ur      2. Ulnar neuropathy of both upper extremities        3. Primary hypertension  lisinopril (PRINIVIL;ZESTRIL) 40 MG tablet      4.  Healthcare maintenance  Pneumococcal, PCV20, PREVNAR 20, (age 25 yrs+), IM, PF    Tdap, BOOSTRIX, (age 8 yrs+), IM          Archana Valencia DO 8/25/2022 11:25 AM

## 2022-08-25 NOTE — PROGRESS NOTES
[address] [facility] Medicine Residency Program - Outpatient Note      Subjective:      Randell Kim is a 65 y.o. female  presented to the office on 08/25/22 with complaints of:    DM2 f/u:  Jenn Valenzuela. Reports she is compliant  A1c has improved today to 6.9 from 7.9 from 3 months ago  Has peripheral neuropathy, normal renal function. Follows up with ophthalmology for annual eye exams. Hypertension-not well controlled. Blood pressure checked 3 times in the office today running in between 140s-150s/80s-90s. Reports she is on lisinopril 20 mg. Compliance is good. Denies S/S of hypertension. Bilateral ulnar neuropathies  She also has history of bilateral ulnar neuropathy. She is using splints/braces with some improvement. She is scheduled to get her EMG done on September 10. She is also following with neurosurgery for same problem. Review of systems  CONSTITUTIONAL: Negative  RESPIRATORY: Negative for cough  CARDIOVASCULAR: Negative for chest pain or SOB  GASTROINTESTINAL: Negative for constipation or nausea  MUSCULOSKELETAL: Lower back pain, chronic  NEUROLOGICAL: Positive for bilateral upper extremities numbness and tingling and weakness  BEHAVIOR/PSYCH: Mood much improved      Objective:      Vitals:    08/25/22 0925   BP: (!) 143/84   Pulse: 76   Temp: 96.9 °F (36.1 °C)       General Appearance - Alert and oriented x 3  Lungs - Bilateral good air entry , no wheezes or rales  Cardiovascular - Regular rate and rhythm. No murmur  Neurologic -bilateral decrease  strength, paresthesias over ulnar distribution both forearms and hands. He did have lower cervical spine tenderness with elicitation of neuropathy symptoms on palpation  Skin - No bruising or bleeding on exposed skin area  MSK - No new joint/bone pains   Psych - normal affect       Assessment:        ICD-10-CM    1.  Type 2 diabetes mellitus without complication, without long-term current use of insulin (HCC)  E11.9 POCT glycosylated hemoglobin (Hb A1C)     lisinopril (PRINIVIL;ZESTRIL) 40 MG tablet     Microalbumin, Ur      2. Ulnar neuropathy of both upper extremities  G56.23       3. Primary hypertension  I10 lisinopril (PRINIVIL;ZESTRIL) 40 MG tablet      4. Healthcare maintenance  Z00.00 Pneumococcal, PCV20, PREVNAR 21, (age 25 yrs+), IM, PF     Tdap, BOOSTRIX, (age 8 yrs+), IM          Plan:    DM2-good control. Continue same medications  Hypertension-increase lisinopril to 40 mg daily  Bilateral ulnar neuropathy-scheduled for EMG. We will follow-up after that. Return in about 2 months (around 10/25/2022) for Dm2, HTN, neuropathy f/u. Requested Prescriptions     Signed Prescriptions Disp Refills    lisinopril (PRINIVIL;ZESTRIL) 40 MG tablet 30 tablet 2     Sig: Take 1 tablet by mouth daily       Medications Discontinued During This Encounter   Medication Reason    HUMALOG 100 UNIT/ML SOLN injection vial Therapy completed    HUMALOG KWIKPEN 100 UNIT/ML SOPN Therapy completed       Kavya received counseling on the following healthy behaviors: nutrition, exercise and medication adherence    Discussed use, benefit, and side effects of prescribed medications. Barriers to medication compliance addressed. All patient questions answered. Pt voiced understanding. Disclaimer: Some orall of this note was transcribed using voice-recognition software. This may cause typographical errors occasionally. Although all effort is made to fix these errors, please do not hesitate to contact our office if there Yg Green concern with the understanding of this note.     Jonathan Garcia MD  Family Medicine PGY-3  08/25/22 at 11:59 AM

## 2022-08-25 NOTE — CARE COORDINATION
Ambulatory Care Coordination Note  8/25/2022    ACC: Juan Keith RN    Summary Note: ACM attempted to reach patient for follow up call regarding outcome of f/u appt with PCP and neurosurgery, PT progress, OT progress. HIPAA compliant message left requesting a return phone call at patients convenience. Will continue to follow. This is the second attempt. Lost to follow up letter sent via "[employer].       Future Appointments   Date Time Provider [place]   8/30/2022 12:15 PM Dellar [place], OT [facility] OT 1 [facility]   8/30/2022  1:30 PM Daisha Felix, [place] [facility] PT 1 [facility]   9/2/2022  9:15 AM Dellar [place], OT [facility] OT 1 [facility]   9/2/2022 10:30 AM Jeanne Pyle, PTA [facility] PT 1 [facility]   9/6/2022  1:15 PM Octavio Miner, PT [facility] PT 1 [facility]   9/6/2022  1:45 PM Dellar [place], OT [facility] OT 1 [facility]   9/6/2022  2:45 PM Jeanne Pyle, PTA [facility] PT 1 [facility]   9/9/2022 10:00 AM Dellar [place], OT [facility] OT 1 [facility]   9/9/2022 11:00 AM Jeanne Pyle, PTA [facility] PT 1 [facility]   9/15/2022  2:30 PM [facility] EMG  [facility] EMG [facility]   9/15/2022  2:30 PM Dev Kilgore MD [facility] med/reha [facility]   9/22/2022 10:30 AM DO SHANNON Medina NEURO [facility]   10/25/2022  9:30 AM Evan Randolph MD [facility] FP [facility]   11/9/2022  2:00 PM Omkar Garnett MD Neuro  Mary Bhagat

## 2022-08-25 NOTE — PROGRESS NOTES
Visit Information    Have you changed or started any medications since your last visit including any over-the-counter medicines, vitamins, or herbal medicines? no   Have you stopped taking any of your medications? Is so, why? -  no  Are you having any side effects from any of your medications? - no    Have you seen any other physician or provider since your last visit?  no   Have you had any other diagnostic tests since your last visit?  no   Have you been seen in the emergency room and/or had an admission in a hospital since we last saw you?  no   Have you had your routine dental cleaning in the past 6 months?  no     Do you have an active MyChart account? If no, what is the barrier?   Yes    Patient Care Team:  Cornelius Poe MD as PCP - General (Family Medicine)  Cornelius Poe MD as PCP - [facility] Provider  Dwayne Parker MD as Consulting Physician (Gastroenterology)  Owen Bravo RN as [address] [place] [address] History Review  Past Medical, Family, and Social History reviewed and does not contribute to the patient presenting condition    Health Maintenance   Topic Date Due    Shingles vaccine (1 of 2) Never done    Pneumococcal 0-64 years Vaccine (2 - PCV) 11/27/2016    Diabetic retinal exam  11/01/2017    Diabetic foot exam  12/01/2017    DTaP/Tdap/Td vaccine (2 - Tdap) 12/15/2020    COVID-19 Vaccine (3 - Booster for Moderna series) 07/11/2021    Diabetic microalbuminuria test  06/11/2022    Lipids  06/24/2022    Colorectal Cancer Screen  09/20/2022    Flu vaccine (1) 09/01/2022    Breast cancer screen  11/14/2022    A1C test (Diabetic or Prediabetic)  05/26/2023    Depression Monitoring  05/26/2023    Hepatitis C screen  Completed    HIV screen  Completed    Hepatitis A vaccine  Aged Out    Hib vaccine  Aged Out    Meningococcal (ACWY) vaccine  Aged Out

## 2022-08-25 NOTE — PROGRESS NOTES
Attending Physician Statement  I have discussed the care off. First name Rony pertinent history and exam findings,  with the resident. I have seen and examined the patient and the key elements of all parts of the encounter have been performed by me. I agree with the assessment, plan and orders as documented by the resident. (GC Modifier)    LBP DJD doing better  Bilat Ulnar tendinopathy- EMG  Depression doing well.   DM well controlled  HTN- Lisinopril dose increased

## 2022-08-29 DIAGNOSIS — F33.9 RECURRENT MAJOR DEPRESSIVE DISORDER, REMISSION STATUS UNSPECIFIED (HCC): ICD-10-CM

## 2022-08-30 ENCOUNTER — HOSPITAL ENCOUNTER (OUTPATIENT)
Dept: PHYSICAL THERAPY | Age: 62
Setting detail: THERAPIES SERIES
Discharge: HOME | End: 2022-08-30
Payer: COMMERCIAL

## 2022-08-30 ENCOUNTER — HOSPITAL ENCOUNTER (OUTPATIENT)
Dept: OCCUPATIONAL THERAPY | Age: 62
Setting detail: THERAPIES SERIES
Discharge: HOME | End: 2022-08-30
Payer: COMMERCIAL

## 2022-08-30 PROCEDURE — 97110 THERAPEUTIC EXERCISES: CPT

## 2022-08-30 PROCEDURE — 97113 AQUATIC THERAPY/EXERCISES: CPT

## 2022-08-30 RX ORDER — EMPAGLIFLOZIN AND METFORMIN HYDROCHLORIDE 5; 1000 MG/1; MG/1
TABLET ORAL
Qty: 180 TABLET | Refills: 2 | Status: SHIPPED | OUTPATIENT
Start: 2022-08-30

## 2022-08-30 NOTE — FLOWSHEET NOTE
[address] [facility]   [address] [place] [address] [address]   Phone: [phone]   Fax: [phone]    Physical Therapy Daily Treatment Note      Date:  2022  Patient Name:  Rebecca Olivier    :  1960  MRN: [id number]  Physician: Deric Jesus MD (resident physician)  Isrrael Tay MD (attending physician)                                  Insurance: Sedrick Felixpjmesha [employer]. Auth required after 30 visits  Medical Diagnosis: M54.16 (ICD-10-CM) - Lumbar radiculopathy              Rehab Codes: M54.16  Onset Date: 2021 with recent exacerbation 2022                    Next 's appt: PCP 22; Neurosurgeon 22; neurology 10/2002  Visit# / total visits:  Cancels/No Shows: 2/0    Subjective: Increased pain this date- states she was on feet a lot for an event at [employer] this weekend. Reports her left leg and lower back are very painful. Is hoping to make a trip this weekend that involves a 5 hour car ride. Pain:  [x] Yes  [] No Location:(L) Lower back into buttock, (L) posterior/lateral thigh to knee; numbness in lower leg to ankle; R buttock and hip Pain Rating: (0-10 scale) 7/10  Pain altered Tx:  [x] No  [] Yes  Action:    Comments:     Objective:  Modalities:   Precautions:  Exercises:    [facility] Exercise Log  Aquatic, Hip & DLS Program- Phase 1    Date of Eval:                                Primary PT: Octavio Miner PT  Diagnosis:   Things to Focus On (goals):   Surgical Precautions:  Medical Precautions:  [] C-9 dates  [] Occ Med   [] Medicare       Date 22    Visit #     Walk F/L/R 2 Laps @ Rail 2 Laps       (no rail) 2 Laps      (no rail) 2 Laps   (no rail)    Marching 2 Laps @ Rail 2 Laps      (no rail) 2 Laps      (no rail) 2 Laps   (no rail)      Low Box Low Box Low Box    Squats 15x5\" 10x5\" 10x5\" 15x5\"    Step-Ups F/L Low 15x Low 15x Low 10x Low 15x Step Down F/L        Heel/Toe Raise 7x Floor 15x Floor 10x Floor 15x    SLR F/L/R 15x 15x 10x 15x    Hip/Knee Flex/Ext 15x 15x 10x 15x    F/L Lunges   Low 10x NT Resume   Figure 8s     Add           Kickboard Ex. Med Med Med Med    Iso Abd.  10x5\" 10x5\" 10x5\" 10x5\"    Push-pull 15x 15x 15x 15x    Paddling 10x 15x 15x 15x            UE Format: Chest Deep 2 Small Balls 2 Small Balls 2 Small Balls 2 Small Balls    Horiz Abd/Add 15x 15x 15x 15x    IR/ER (wipers) 15x 15x 15x 15x    Alt Flex/Ext 15x 15x 15x 15x    Alt Press Down 15x 15x 15x 15x    Abd/Add 15x 15x 15x 15x            Deep Water: 1 Noodle 1 Noodle 1 Noodle 1 Noodle    Hang 5' 5' 5' 5'    Cycling 2' 2 2' 2'    Jacks 2' 2' 2' 2'    X-Country 2' 2' 2' 2'            Balance        SLS                Stretches        Achllies        Hamstring - 2x30\" 2x30\" 2x30\"    Piriformis                Breast Stroke - 2 Laps 2 Laps 2 Laps    Pain Rating 5 4-5 4 7        Specific Instructions for next treatment: Increase reps and speed to build trunk stability and to assist with balance    Assessment: [x] Progressing toward goals. Completed program to tolerance due to patients increased pain this date. Emphasis on postural control and proper technique while encouraging increased speed to tolerance. Overall she tolerated everything well except WB L LE with R LE open chain hip ABD- decreased reps/range to tolerance; held lunges this date. Discussed with patient rest breaks if driving a 5 hour trip- encouraged stretching/walking every hour. Patient noted relief with deep water unloading and reported decreased pain exiting pool. [] No change. [] Other:    Skilled PT intervention is required to help centralize/modulate sx, improve pain free lumbar AROM and maximize neuromuscular strength & stability necessary for unrestricted ADL/ work tolerances at her prior level of function.      STG: (to be met in 18 treatments)  Pt will self report worst pain no greater than 3/10 in order to better tolerate ADLs/work activities with minimal dysfunction GOAL PROGRESSING AND EXTENDED THROUGH POC 8/1  Pt will improve lumbar AROM to 90% or greater in all planes in order to demonstrate ability to move/reach in all planes unrestricted at [facility] POC 8/1  Pt will improve trunk strength to 4/5 or greater in all planes to show improved stability at prior level GOAL PROGRESSING AND EXTENDED THROUGH POC 8/1  Pt will self report reduced peripheral L LE pain (no distal than L knee) to demonstrate initial  favorable centralization response to therapy GOAL PROGRESSING AND EXTENDED THROUGH POC 8/1 (SX PRIMARILY EXTEND TO KNEE, BUT OCCASIONALLY STILL RADIATE INTO LOWER LEG)  LTG: (to be met in 26 treatments)  Pt will demonstrate improved B LE strength to 4/5 or greater in order to demonstrate improved stability/strength necessary for unrestricted ADLs/work activities GOAL [address] POC 8/1  Pt will self report ability to complete all work duties with pain no greater than 1/10 to demonstrate unrestricted functional tolerances at prior level GOAL PROGRESSING AND EXTENDED THROUGH POC 8/1  Pt will improve core strength [place]ann Grade 2/5 or greater to demonstrate better support and stability to lumbar spine GOAL PROGRESSING AND EXTENDED THROUGH POC 8/1  Pt will decrease SHEY to 10% impaired or less in order to demonstrate improved functional tolerances at PLOF with minimal restriction/dysfunction GOAL PROGRESSING AND EXTENDED THROUGH POC 8/1  Pt will demonstrate independence with a long term HEP for continued progress/maintenance after completion of PT GOAL PROGRESSING AND EXTENDED THROUGH POC 8/1      Patient Goals/Rehab Expectations: To alleviate pain and get back to normal    Pt. Education:  [] Yes  [] No  [x] Reviewed Prior HEP/Ed  Method of Education: [x] Verbal  [x] Demo  [] Written  Comprehension of Education:   [x] Verbalizes understanding.   [x] Demonstrates understanding. [] Needs review. [] Demonstrates/verbalizes HEP/Ed previously given. Plan: [x] Continue per plan of care.    [] Other:       Treatment Charges: Mins Units   []  Modalities     []  Ther Exercise     []  Manual Therapy     []  Ther Activities     [x]  Aquatics 55 4   []  Neuromuscular     [] Vasocompression     [] Gait Training     [] Dry needling        [] 1 or 2 muscles        [] 3 or more muscles     []  Other     Total Treatment time 55 4     Time In: 127 PM            Time Out: 230 PM    Electronically signed by:  Tremayne Rose PTA

## 2022-08-30 NOTE — TELEPHONE ENCOUNTER
Last visit: 8/25/22  Last Med refill: 7/2022  Does patient have enough medication for 72 hours: No:     Next Visit Date:  Future Appointments   Date Time Provider Nicola Zaidi   8/30/2022 12:15 PM Yadira Pion, OT [facility] [address]   8/30/2022  1:30 PM Ana Laura Constantino, [place] [facility] PT [facility]   9/2/2022  9:15 AM Yadira Pion, OT [facility] OT [facility]   9/2/2022 10:30 AM Sheldon Gogn, PTA [facility] MOB PT [facility]   9/6/2022  1:15 PM Caren Edgar, PT [facility] PT [facility]   9/6/2022  1:45 PM Yadira Pion, OT [facility] OT [facility]   9/6/2022  2:45 PM Sheldon Gong, PTA [facility] PT [facility]   9/9/2022 10:00 AM Purlear Pion, OT [facility] OT [facility]   9/9/2022 11:00 AM Sheldon Gong, PTA [facility] MOB PT [facility]   9/15/2022  2:30 PM STC EMG  STCZ EMG [facility]   9/15/2022  2:30 PM Aleshia Bryan MD [facility] med/reha [facility]   9/22/2022 10:30 AM DO SHANNON Ghotra NEURO [facility]   10/25/2022  9:30 AM Kylah Wolff MD [facility]   11/9/2022  2:00 PM Mo'Men Lenor Ormond, MD Neuro [address] Maintenance   Topic Date Due    Shingles vaccine (1 of 2) Never done    Diabetic retinal exam  11/01/2017    Diabetic foot exam  12/01/2017    COVID-19 Vaccine (3 - Booster for Moderna series) 07/11/2021    Diabetic microalbuminuria test  06/11/2022    Lipids  06/24/2022    Colorectal Cancer Screen  09/20/2022    Flu vaccine (1) 09/01/2022    Breast cancer screen  11/14/2022    Depression Monitoring  05/26/2023    A1C test (Diabetic or Prediabetic)  08/25/2023    DTaP/Tdap/Td vaccine (3 - Td or Tdap) 08/25/2032    Pneumococcal 0-64 years Vaccine  Completed    Hepatitis C screen  Completed    HIV screen  Completed    Hepatitis A vaccine  Aged Out    Hib vaccine  Aged Out    Meningococcal (ACWY) vaccine  Aged Out       Hemoglobin A1C (%)   Date Value   08/25/2022 6.9   05/26/2022 7.9   09/14/2021 7.7             ( goal A1C is < 7)   Microalb/Crt.  Ratio (mcg/mg creat)   Date Value 06/11/2021 CANNOT BE CALCULATED     LDL Cholesterol (mg/dL)   Date Value   06/24/2021 52   04/20/2021 48       (goal LDL is <100)   AST (U/L)   Date Value   05/31/2022 27     ALT (U/L)   Date Value   05/31/2022 36 (H)     BUN (mg/dL)   Date Value   07/06/2022 15     BP Readings from Last 3 Encounters:   08/25/22 (!) 143/84   08/18/22 138/74   08/01/22 134/80          (goal 120/80)    All Future Testing planned in [facility]  Lab Frequency Next Occurrence   Basic Metabolic Panel Once 62/03/5743   Saline lock IV Once 05/10/2022   Lumbar Epidural Steroid Injection/Caudal Once 11/10/2021   Lipid Panel Once 06/26/2022   Comprehensive Metabolic Panel Once 37/33/4366   Vitamin D 25 Hydroxy Once 05/26/2022   Sleep Study with PAP Titration Once 07/08/2022   Baseline Diagnostic Sleep Study Once 07/08/2022   EMG Once 07/29/2022   Microalbumin, Ur Once 08/25/2022               Patient Active Problem List:     Cervical spondylosis     Type 2 diabetes mellitus without complication, without long-term current use of insulin (HCC)     Hypertension     Abnormal auditory perception     Eustachian tube dysfunction     Chronic serous otitis media     Nasal polyps     Nasal congestion     Osteoarthritis of left knee     Osteoarthritis of right knee     DIEGO (nonalcoholic steatohepatitis)     Vitamin D deficiency     Iron deficiency anemia     Dyslipidemia     Diabetes mellitus type 2, uncontrolled (HCC)     Left hip pain     Trochanteric bursitis     Fatigue     Daytime somnolence     Snoring     Chronic left shoulder pain     Restless legs syndrome     Generalized anxiety disorder     Primary osteoarthritis, left shoulder     Tendinopathy of left shoulder     Adhesive capsulitis of left shoulder     Lumbar radiculopathy     Back pain     Toxic metabolic encephalopathy     Class 2 obesity in adult     Ulnar neuropathy of both upper extremities     Leg edema     Recurrent major depressive disorder (HCC)     Dermatitis     Retrolisthesis of vertebrae     Bilateral carpal tunnel syndrome

## 2022-08-30 NOTE — FLOWSHEET NOTE
[] [place] [facility]       Occupational Therapy            1st floor       [address] [place], [place]         Phone: [phone]       Fax: [phone] [] [facility]  [address]  [place], [place]  Phone: [phone]  Fax: [phone]  [x] [facility] ([facility]) @ [facility]  [address] [facility] [address]  [place], [address] [place] [address] [employer]  Phone [phone]  Fax [phone]      Occupational Therapy Daily Treatment Note    Date:  2022  Patient Name:  Madelaine Castellanos    :  1960  MRN: [id number]  Referring Provider:  Ahsan Leon MD  Insurance: Other [facility]  Medical Diagnosis: Retrolisthesis of vertebrae (M43.10) Bilateral Carpal Tunnel (G56.03)     Rehab Codes: pain in right upper arm M79.621,, pain in left upper arm M79.622, , lack of coordination unspecified R27.0,, or parasthesia of skin R20.2,  Onset Date: 22                 Next  [address]: 22  Visit# / total visits: ; Progress note for Medicare patient due at visit 8-9    Cancels/No Shows: 0/0      Subjective:    Pain:  [] Yes  [x] No Location: N/A Pain Rating: (0-10 scale) 0/10  Pain altered Tx:  [x] No  [] Yes  Action:  Pt Comments: Pt reports having a long weekend w/ a lot of activities and is feeling very tired upon arrival to OT.      Objective:  Modalities:  Precautions: N/A  Exercises:  Exrcise Reps/Time Weight/Level Comments   Tendon glide 10   Completed   Ulnar nerve glide 2x10   Shoulder 90 w/ elbow flex/ext   Completed   gripping 10 yellow Digiflex  Completed   Thumb opposition 10   Completed   Scapula retraction   3x10 Green   Completed    Shoulder extention 3x10   Green  Completed   Tricep extension 3x10 green    Clip placing  Yellow/red/green/Blue Completed   FMC 4 rows  Placing pegs in board   Rows 3x10 green completed   PNF 3x10 Green rt UE going from rt shoulder to lt hip 10x , PNF diagonal lt UE going from lt shoudler to rt hip Flexbar    Wrist Flexion/Ext  Wrist pronation  Wrist supination 2x10 red completed   Other:      Treatment Charges: Mins Units   []  Modalities:      []  Ultrasound     [x]  Ther Exercise 45 3   []  Manual Therapy     []  Ther Activities     []  Orthotic fit/train     []  Orthotic recheck     []  Other     Total Treatment time 45 3       Assessment: [x] Progressing toward goals. Pt cont to display decreased strength in B hands and subjectively reports cont numbness and tingling. Cont massage throughout ulnar nerve pathway. Cont [address] activities of placing small pegs into a peg board. Cont w/ posture exercises of shoulder extension and scapular retractions. Cont PNF diagonal patterns w/ green theraband. Introduced flexbar exercises. cont ulnar nerve glides to shoulder at 90 while flex/ext elbow and wrist, bilaterally. Pt completed them w/out issue. Pt tolerated tx well today w/out complaint. [] No change. [] Other     [x] Patient would continue to benefit from skilled occupational therapy services in order to: Improve, Increase, and Restore  functional /grasp, Motor control/Tone in UE, Strength, Activity tolerance, and Coordination deficit in order to improve functional use of UE in ADL performance and return to PLOF     STG/LTG  Short Term Goals: (  8    Treatments)  Decrease numbness/tingling per subjective report   Increase strength (pounds);  strength by 5 lbs  Increase pinch strength by 3lbs  Pt will increase 9-hole pef test by 10 seconds on each UE  Increase function:UE Functional Index Score 8 or more points to promote increased functional abilities  Demo knowledge of fall prevention in 2 sessions. Patient to be independent with home exercise program as demonstrated by performance with correct form without cues.      Long Term Goals: (  16  Treatments)  Pt will increase 9 hole peg test time by 15 seconds  Pt will improve UEFI score by 15 points  Pt will increase  strength by 10 lbs  Pt will

## 2022-09-01 ENCOUNTER — HOSPITAL ENCOUNTER (OUTPATIENT)
Dept: PHYSICAL THERAPY | Age: 62
Setting detail: THERAPIES SERIES
Discharge: HOME | End: 2022-09-01
Payer: COMMERCIAL

## 2022-09-01 PROCEDURE — 97113 AQUATIC THERAPY/EXERCISES: CPT

## 2022-09-01 NOTE — FLOWSHEET NOTE
[facility]   [address] [place] [address] [address]   Phone: [phone]   Fax: [phone]    Physical Therapy Daily Treatment Note      Date:  2022  Patient Name:  Drake Brambila    :  1960  MRN: [id number]  Physician: Moisés Jimenez MD (resident physician)  Kalyani Pena MD (attending physician)                                  Insurance: [employer]. Auth required after 30 visits  Medical Diagnosis: M54.16 (ICD-10-CM) - Lumbar radiculopathy              Rehab Codes: M54.16  Onset Date: 2021 with recent exacerbation 2022                    Next 's appt: PCP 22; Neurosurgeon 22; neurology 10/2002  Visit# / total visits:  Cancels/No Shows: 2/0    Subjective:   : Patient states symptoms remain unchanged since last session. States she is still planning to take a trip up [facility] that is a 5 hour car ride. Pain:  [x] Yes  [] No Location:(L) Lower back into buttock, (L) posterior/lateral thigh to knee; numbness in lower leg to ankle; R buttock and hip Pain Rating: (0-10 scale) 7/10  Pain altered Tx:  [x] No  [] Yes  Action:    Comments:     Objective:  Modalities:   Precautions:  Exercises:    [facility] Exercise Log  Aquatic, Hip & DLS Program- Phase 1    Date of Eval:                                Primary PT: Heri Pa PT  Diagnosis:   Things to Focus On (goals):   Surgical Precautions:  Medical Precautions:  [] C-9 dates  [] Occ Med   [] Medicare       Date 22    Visit #     Walk F/L/R 2 Laps       (no rail) 2 Laps      (no rail) 2 Laps   (no rail) 2 Laps   (no rail)    Marching 2 Laps      (no rail) 2 Laps      (no rail) 2 Laps   (no rail) 2 Laps   (no rail)     Low Box Low Box Low Box Low Box    Squats 10x5\" 10x5\" 15x5\" 15x5\"    Step-Ups F/L Low 15x Low 10x Low 15x Low 15x    Step Down F/L        Heel/Toe Raise Floor 15x Floor 10x Floor 15x Floor 15x    SLR F/L/R 15x 10x 15x 15x    Hip/Knee Flex/Ext 15x 10x 15x 15x    F/L Lunges  Low 10x NT Low 10x    Figure 8s     Add            Kickboard Ex. Med Med Med Med    Iso Abd.  10x5\" 10x5\" 10x5\" 10x5\"    Push-pull 15x 15x 15x 15x    Paddling 15x 15x 15x 15x            UE Format: 2 Small Balls 2 Small Balls 2 Small Balls 2 Small Balls    Horiz Abd/Add 15x 15x 15x 15x    IR/ER (wipers) 15x 15x 15x 15x    Alt Flex/Ext 15x 15x 15x 15x    Alt Press Down 15x 15x 15x 15x    Abd/Add 15x 15x 15x 15x            Deep Water: 1 Noodle 1 Noodle 1 Noodle 1 Noodle    Hang 5' 5' 5' 5'    Cycling 2 2' 2' 2'    Jacks 2' 2' 2' 2'    X-Country 2' 2' 2' 2'            Balance        SLS                Stretches        Achllies        Hamstring 2x30\" 2x30\" 2x30\" 2x30\"    Piriformis                Breast Stroke 2 Laps 2 Laps 2 Laps 2 Laps    Pain Rating 4-5 4 7 7         Specific Instructions for next treatment: Increase reps and speed to build trunk stability and to assist with balance    Assessment: [x] Progressing toward goals. 9/1: Patient completed all exercise as charted above without increased pain symptoms. Required cues for posture awareness and ore engagement especially with standing 3 way hip kick, and standing knee flex/ext. Challenged patient with standing exercise using minimal UE support finger tip touch-light touch only this date. Patient noted most relief of symptoms with deep water hang. [] No change. [] Other:    Skilled PT intervention is required to help centralize/modulate sx, improve pain free lumbar AROM and maximize neuromuscular strength & stability necessary for unrestricted ADL/ work tolerances at her prior level of function.      STG: (to be met in 18 treatments)  Pt will self report worst pain no greater than 3/10 in order to better tolerate ADLs/work activities with minimal dysfunction GOAL PROGRESSING AND EXTENDED THROUGH POC 8/1  Pt will improve lumbar AROM to 90% or greater in all planes in order to demonstrate ability to move/reach in all planes unrestricted at [facility] POC 8/1  Pt will improve trunk strength to 4/5 or greater in all planes to show improved stability at prior level GOAL PROGRESSING AND EXTENDED THROUGH POC 8/1  Pt will self report reduced peripheral L LE pain (no distal than L knee) to demonstrate initial  favorable centralization response to therapy GOAL PROGRESSING AND EXTENDED THROUGH POC 8/1 (SX PRIMARILY EXTEND TO KNEE, BUT OCCASIONALLY STILL RADIATE INTO LOWER LEG)  LTG: (to be met in 26 treatments)  Pt will demonstrate improved B LE strength to 4/5 or greater in order to demonstrate improved stability/strength necessary for unrestricted ADLs/work activities [facility] POC 8/1  Pt will self report ability to complete all work duties with pain no greater than 1/10 to demonstrate unrestricted functional tolerances at prior level GOAL PROGRESSING AND EXTENDED THROUGH POC 8/1  Pt will improve core strength Sahrmann Grade 2/5 or greater to demonstrate better support and stability to lumbar spine GOAL PROGRESSING AND EXTENDED THROUGH POC 8/1  Pt will decrease SHEY to 10% impaired or less in order to demonstrate improved functional tolerances at PLOF with minimal restriction/dysfunction GOAL PROGRESSING AND EXTENDED THROUGH POC 8/1  Pt will demonstrate independence with a long term HEP for continued progress/maintenance after completion of PT GOAL PROGRESSING AND EXTENDED THROUGH POC 8/1      Patient Goals/Rehab Expectations: To alleviate pain and get back to normal    Pt. Education:  [] Yes  [] No  [x] Reviewed Prior HEP/Ed  Method of Education: [x] Verbal  [x] Demo  [] Written  Comprehension of Education:   [x] Verbalizes understanding. [x] Demonstrates understanding. [] Needs review. [] Demonstrates/verbalizes HEP/Ed previously given. Plan: [x] Continue per plan of care.    [] Other:       Treatment Charges: Mins Units   []  Modalities     []  Ther Exercise     []  Manual Therapy     []  Ther Activities     [x]  Aquatics 56 4   []  Neuromuscular     [] Vasocompression     [] Gait Training     [] Dry needling        [] 1 or 2 muscles        [] 3 or more muscles     []  Other     Total Treatment time 56 4     Time In: 780 PM            Time Out: 580 PM (-5 deep water hang)     Electronically signed by:  Benton Ponce PTA

## 2022-09-02 ENCOUNTER — HOSPITAL ENCOUNTER (OUTPATIENT)
Dept: OCCUPATIONAL THERAPY | Age: 62
Setting detail: THERAPIES SERIES
Discharge: HOME | End: 2022-09-02
Payer: COMMERCIAL

## 2022-09-02 PROCEDURE — 97110 THERAPEUTIC EXERCISES: CPT

## 2022-09-02 NOTE — FLOWSHEET NOTE
[] [facility]       Occupational Therapy            1st floor       [address] [place], [place]         Phone: [phone]       Fax: [phone] [] [facility]  [address]  [place], [place]  Phone: [phone]  Fax: [phone]  [x] [facility] ([facility]) @ [facility]  [address] [facility] [address]  [place], [address] [place] [address]  Phone [phone]  Fax [phone]      Occupational Therapy Daily Treatment Note    Date:  2022  Patient Name:  Meron Stanley    :  1960  MRN: [id number]  Referring Provider:  Bryan Rangel MD  Insurance: Other [facility]  Medical Diagnosis: Retrolisthesis of vertebrae (M43.10) Bilateral Carpal Tunnel (G56.03)     Rehab Codes: pain in right upper arm M79.621,, pain in left upper arm M79.622, , lack of coordination unspecified R27.0,, or parasthesia of skin R20.2,  Onset Date: 22                 Next  [address]: 22  Visit# / total visits: ; Progress note for Medicare patient due at visit 8-9    Cancels/No Shows: 0/0      Subjective:    Pain:  [] Yes  [x] No Location: N/A Pain Rating: (0-10 scale) 0/10  Pain altered Tx:  [x] No  [] Yes  Action:  Pt Comments: Pt reports R UE doesn't feel \"as bad\"  L UE no improvement.      Objective:  Modalities:  Precautions: N/A  Exercises:  Exrcise Reps/Time Weight/Level Comments   Tendon glide 10   Completed   Ulnar nerve glide 2x10   Shoulder 90 w/ elbow flex/ext   Completed   gripping 10 yellow Digiflex  Completed   Thumb opposition 10   Completed   Scapula retraction   3x10 Green   Completed    Shoulder extention 3x10   Green  Completed   Tricep extension 3x10 green    Clip placing  Yellow/red/green/Blue Completed   FMC 4 rows  Placing pegs in board   Rows 3x10 green completed   PNF 3x10 Green rt UE going from rt shoulder to lt hip 10x , PNF diagonal lt UE going from lt shoudler to rt hip   Flexbar    Wrist Flexion/Ext  Wrist pronation  Wrist supination 2x15 red completed   Other:      Treatment Charges: Mins Units   []  Modalities:      []  Ultrasound     [x]  Ther Exercise 35 2   []  Manual Therapy     []  Ther Activities     []  Orthotic fit/train     []  Orthotic recheck     []  Other     Total Treatment time 35 2       Assessment: [x] Progressing toward goals. Pt arrived approximately 10 minutes late, tx adjusted it accordingly. Cont massage throughout ulnar nerve pathway. Cont [address] activities of placing small pegs into a peg board. Cont w/ posture exercises of shoulder extension and scapular retractions. Cont PNF diagonal patterns w/ green theraband. Introduced flexbar exercises. cont ulnar nerve glides to shoulder at 90 while flex/ext elbow and wrist, bilaterally. Pt completed them w/out issue. Pt tolerated tx well today w/out complaint. [] No change. [] Other     [x] Patient would continue to benefit from skilled occupational therapy services in order to: Improve, Increase, and Restore  functional /grasp, Motor control/Tone in UE, Strength, Activity tolerance, and Coordination deficit in order to improve functional use of UE in ADL performance and return to PLOF     STG/LTG  Short Term Goals: (  8    Treatments)  Decrease numbness/tingling per subjective report   Increase strength (pounds);  strength by 5 lbs  Increase pinch strength by 3lbs  Pt will increase 9-hole pef test by 10 seconds on each UE  Increase function:UE Functional Index Score 8 or more points to promote increased functional abilities  Demo knowledge of fall prevention in 2 sessions. Patient to be independent with home exercise program as demonstrated by performance with correct form without cues. Long Term Goals: (  16  Treatments)  Pt will increase 9 hole peg test time by 15 seconds  Pt will improve UEFI score by 15 points  Pt will increase  strength by 10 lbs  Pt will increase pinch strength by 5 lbs    Pt.  Education:  [x] Yes  [] No  [x] Reviewed Prior HEP/Ed  Method of Education: [x] Verbal  [x] Demo  [] Written  Re: Review HEP w/ pt   Comprehension of Education:  [x] Verbalizes understanding. [x] Demonstrates understanding. [] Needs review. [] Demonstrates/verbalizes HEP/Ed previously given. Plan: [x] Continue current frequency toward short and long term goals. [x] Specific Instructions for subsequent treatments: Cont w/ nerve glides and strengthening.    [] Other:       Time In: 0840  Time Out: 135 [address]      Electronically signed by:  Radha Thrasher OTR/L

## 2022-09-06 ENCOUNTER — HOSPITAL ENCOUNTER (OUTPATIENT)
Dept: PHYSICAL THERAPY | Age: 62
Setting detail: THERAPIES SERIES
Discharge: HOME | End: 2022-09-06
Payer: COMMERCIAL

## 2022-09-06 ENCOUNTER — HOSPITAL ENCOUNTER (OUTPATIENT)
Dept: OCCUPATIONAL THERAPY | Age: 62
Setting detail: THERAPIES SERIES
Discharge: HOME | End: 2022-09-06
Payer: COMMERCIAL

## 2022-09-06 PROCEDURE — 97113 AQUATIC THERAPY/EXERCISES: CPT

## 2022-09-06 PROCEDURE — 97110 THERAPEUTIC EXERCISES: CPT

## 2022-09-06 NOTE — PROGRESS NOTES
[address] [facility]  [address] [facility]         Phone: [phone]       Fax: [phone]    Physical Therapy Progress Note    Date: 2022      Patient: Vivica Heimlich  : 1960  MRN: [id number]    Physician: Junior Clayton MD (resident physician)  Janet Hoffman MD (attending physician)      Insurance: [employer]. Auth required after 30 visits   Diagnosis: M54.16 (ICD-10-CM) - Lumbar radiculopathy   Onset Date: 2021 with recent exacerbation 2022  Next . [address]:  with PCP. 22 with neurosurgery. 10/20/22 with neurology  Total visits attended:   Cancels/No shows: 00  Date of initial visit: 22                    Subjective: Pt states that her pain is higher than usual in the L side of her low back radiating into her L hip, which she attributes to sitting in the car for several hours yesterday as she spent the labor day weekend in a cabin out of town with her family. Feels like her strength is getting better, but still quite weak in her L hand and L leg. Continues to feel numbness in the lower part of her L LE but that pain no longer radiates past her knee. Still using a SPC for community mobility but has been able to walk short distances in her home without an AD.    Pain:  [x] Yes  [] No  Location: L low back into L posterior thigh Pain Rating: (0-10 scale) 6/10 currently at rest  Pain altered Tx:  [x] No  [] Yes  Action:  Comments:      Objective:     Range of Motion  Left Range of Motion  Right Strength  Left Strength  Right   Lumbar Flexion 75%  Increase pain in L hip 75% 3+/5 3+/5   Lumbar Extension 75%  Low back stiffness 75%  Low back stiffness 4/5 4/5   Lumbar Rotation 50%  Pain 50%  Pain 4/5 4/5   Lumbar Side Bend 90%  L sided LBP 90% 4+/5 4+/5   Hip Flexion WNL for all below WNL for all below 3+/5 4/5   Hip Abduction     3+/5 4/5   Hip Adduction     4-/5 4/5   Hip Extension     3+/5 4/5   Hip ER Hip IR           Knee Flexion     4-/5 4+/5   Knee Extension     3+/5 4+/5   Dorsiflexion     4+/5 4+/5   Plantar flexion     4+/5 4+/5   Inversion           Eversion           *All LE MMT performed in modified (seated) positioning    30 second timed chair stand: 9 repetitions without UE support      Assessment:  Pt has been seen for 25 PT visits to date, now 3 months post-discharge from her recent hospital admission due to a severe exacerbation of L LE radicular sx. Overall pt demonstrates very good progress since previous assessment with improved gait safety and efficiency, still using a SPC in community but now able to amb short distances without an AD Pt reports improved pain levels and less peripheralization with pt's pain no longer radiating past her L knee, although continues to feel mild residual numbness in her L lower leg. . Strength in B LEs and core is improving but continues to be weak most notably in her L LE, although measurable improved from previous assessment 1 month ago. Pt is also now under the care of OT at this facility to address her L UE fine motor deficits which she states is going well. Expresses interest in continuing aquatic therapy for PT to continue to work on rebuilding her strength in her L LE and core. Pt seems to be responding well to aquatic therapy with steady improvement to tdate and will extend POC as prescribed to address all stated limitations and goals below to restore this patient to prior independent level of function.      STG: (to be met in 18 treatments)  Pt will self report worst pain no greater than 3/10 in order to better tolerate ADLs/work activities with minimal dysfunction GOAL PROGRESSING AND EXTENDED 9/6  Pt will improve lumbar AROM to 90% or greater in all planes in order to demonstrate ability to move/reach in all planes [facility]/6  Pt will improve trunk strength to 4/5 or greater in all planes to show improved stability at prior level GOAL PROGRESSING AND EXTENDED 9/6  Pt will self report reduced peripheral L LE pain (no distal than L knee) to demonstrate initial  favorable centralization response to therapy GOAL MET 9/6  LTG: (to be met in 26 treatments)  Pt will demonstrate improved B LE strength to 4/5 or greater in order to demonstrate improved stability/strength necessary for unrestricted ADLs/work activities GOAL PROGRESSING AND EXTENDED 9/6  Pt will self report ability to complete all work duties with pain no greater than 1/10 to demonstrate unrestricted functional tolerances at prior level Sola Hayley Shwetacody Butchersus 906 9/6  Pt will improve core strength Sahrmann Grade 2/5 or greater to demonstrate better support and stability to lumbar spine GOAL PROGRESSING AND EXTENDED 9/6  Pt will decrease SHEY to 10% impaired or less in order to demonstrate improved functional tolerances at PLOF with minimal restriction/dysfunction GOAL PROGRESSING AND EXTENDED 9/6  Pt will demonstrate independence with a long term HEP for continued progress/maintenance after completion of PT GOAL PROGRESSING AND EXTENDED 9/6      Treatment to Date:  [x] Therapeutic Exercise   51770  [] Iontophoresis: 4 mg/mL Dexamethasone Sodium Phosphate  mAmin  09288   [] Therapeutic Activity  65750 [] Vasopneumatic cold with compression  79680    [] Gait Training   54605 [] Ultrasound   91348   [] Neuromuscular Re-education  94559 [] Electrical Stimulation Unattended  26042   [] Manual Therapy  98774 [] Electrical Stimulation Attended  15431   [x] Instruction in HEP  [] Lumbar/Cervical Traction  74409   [x] Aquatic Therapy   71268 [] Cold/hotpack    [] Massage   34614      [] Dry Needling, 1 or 2 muscles  78753   [] Biofeedback, first 15 minutes   69917  [] Biofeedback, additional 15 minutes   83723 [] Dry Needling, 3 or more muscles  86568      Patient Status:     [] Continue per initial plan of care. [x] Additional visits necessary.     [] Other:         Requested Frequency/Duration: 2 times per week for 10 additional treatments (36 total visits on current POC). However, will require additional insurance authorization after completion of 30 visits. PT Re-evaluation Time: 1:18pm-1:39pm(21' billed for skilled reassessment of all subjective & objective measures, all goals and extension of current POC)   *SEE AQUATICS PTA NOTE FOR COMPLETE BILLING SUMMARY FOR TODAY'S DOS        Electronically signed by: Octavio Miner PT    If you have any questions or concerns, please don't hesitate to call. Thank you for your referral.    Physician Signature:________________________________Date:__________________  By signing above or cosigning this note, I have reviewed this plan of care and certify a need for medically necessary rehabilitation services.      *PLEASE SIGN ABOVE AND FAX BACK ALL PAGES*

## 2022-09-06 NOTE — FLOWSHEET NOTE
[facility]   [address] [place] [address] [address]   Phone: [phone]   Fax: [phone]    Physical Therapy Daily Treatment Note      Date:  2022  Patient Name:  Cale Mcbride    :  1960  MRN: [id number]  Physician: Eli Tucker MD (resident physician)  Leisa Mauricio MD (attending physician)                                  Insurance: [employer]. Auth required after 30 visits  Medical Diagnosis: M54.16 (ICD-10-CM) - Lumbar radiculopathy              Rehab Codes: M54.16  Onset Date: 2021 with recent exacerbation 2022                    Next 's appt: PCP 22; Neurosurgeon 22; neurology 10/2002  Visit# / total visits:  Cancels/No Shows: 2/0    Subjective: Pt reports hurting more then normal today. States still mostly on left side but does hurt all across lumbar back and into right buttocks/hip. States she was very active this weekend and has been hurting more since then. Pain:  [x] Yes  [] No Location:(L) Lower back into buttock, (L) posterior/lateral thigh to knee; numbness in lower leg to ankle; R buttock and hip Pain Rating: (0-10 scale) 7/10  Pain altered Tx:  [x] No  [] Yes  Action:    Comments:     Objective:  Modalities:   Precautions:  Exercises:    [facility] Exercise Log  Aquatic, Hip & DLS Program- Phase 1    Date of Eval:                                Primary PT: Pina Bradley PT  Diagnosis:   Things to Focus On (goals):   Surgical Precautions:  Medical Precautions:  [] C-9 dates  [] Occ Med   [] Medicare       Date 22   Visit #    Walk F/L/R 2 Laps       (no rail) 2 Laps      (no rail) 2 Laps   (no rail) 2 Laps   (no rail) 2 Laps   Marching 2 Laps      (no rail) 2 Laps      (no rail) 2 Laps   (no rail) 2 Laps   (no rail) 2 Laps    Low Box Low Box Low Box Low Box Low box   Squats 10x5\" 10x5\" 15x5\" 15x5\" 15x5\"   Step-Ups F/L Low 15x Low 10x Low 15x Low 15x Low 15x   Step Down F/L        Heel/Toe Raise Floor 15x Floor 10x Floor 15x Floor 15x Floor 15x   SLR F/L/R 15x 10x 15x 15x 15x   Hip/Knee Flex/Ext 15x 10x 15x 15x 15x   F/L Lunges  Low 10x NT Low 10x Low 10x   Figure 8s                Kickboard Ex. Med Med Med Med Med   Iso Abd.  10x5\" 10x5\" 10x5\" 10x5\" 10x5\"   Push-pull 15x 15x 15x 15x 15x   Paddling 15x 15x 15x 15x 15x           UE Format: 2 Small Balls 2 Small Balls 2 Small Balls 2 Small Balls 2 Sm Balls   Horiz Abd/Add 15x 15x 15x 15x 15x   IR/ER (wipers) 15x 15x 15x 15x 15x   Alt Flex/Ext 15x 15x 15x 15x 15x   Alt Press Down 15x 15x 15x 15x 15x   Abd/Add 15x 15x 15x 15x 15x           Deep Water: 1 Noodle 1 Noodle 1 Noodle 1 Noodle 1 Noodle   Hang 5' 5' 5' 5' 5'   Cycling 2 2' 2' 2' 2'   Jacks 2' 2' 2' 2' 2'   X-Country 2' 2' 2' 2' 2'           Balance        SLS                Stretches        Achllies        Hamstring 2x30\" 2x30\" 2x30\" 2x30\" 2x30\"   Piriformis                Breast Stroke 2 Laps 2 Laps 2 Laps 2 Laps 2 Laps   Pain Rating 4-5 4 7 7  7       Specific Instructions for next treatment: Increase reps and speed to build trunk stability and to assist with balance    Assessment: [x] Progressing toward goals. Increased speeds on all exercises performed today. Also pt continued to challenge balance with little to no UE support with all exercises. Continues to have more pain and less stability while WB on LLE for SLR and hip/knee flex/ext exercises. Notes less pain after deep water hang. [] No change. [x] Other: Skilled PT intervention is required to help centralize/modulate sx, improve pain free lumbar AROM and maximize neuromuscular strength & stability necessary for unrestricted ADL/ work tolerances at her prior level of function.      STG: (to be met in 18 treatments)  Pt will self report worst pain no greater than 3/10 in order to better tolerate ADLs/work activities with minimal dysfunction GOAL PROGRESSING AND EXTENDED 9/6  Pt will improve lumbar AROM to 90% or greater in all planes in order to demonstrate ability to move/reach in all planes unrestricted at [facility] 9/6  Pt will improve trunk strength to 4/5 or greater in all planes to show improved stability at prior level GOAL PROGRESSING AND EXTENDED 9/6  Pt will self report reduced peripheral L LE pain (no distal than L knee) to demonstrate initial  favorable centralization response to therapy GOAL MET 9/6  LTG: (to be met in 26 treatments)  Pt will demonstrate improved B LE strength to 4/5 or greater in order to demonstrate improved stability/strength necessary for unrestricted ADLs/work activities GOAL PROGRESSING AND EXTENDED 9/6  Pt will self report ability to complete all work duties with pain no greater than 1/10 to demonstrate unrestricted functional tolerances at prior level GOAL PROGRESSING AND EXTENDED 9/6  Pt will improve core strength Sahrmann Grade 2/5 or greater to demonstrate better support and stability to lumbar spine GOAL PROGRESSING AND EXTENDED 9/6  Pt will decrease SHEY to 10% impaired or less in order to demonstrate improved functional tolerances at PLOF with minimal restriction/dysfunction GOAL PROGRESSING AND EXTENDED 9/6  Pt will demonstrate independence with a long term HEP for continued progress/maintenance after completion of PT GOAL PROGRESSING AND EXTENDED 9/6      Patient Goals/Rehab Expectations: To alleviate pain and get back to normal    Pt. Education:  [] Yes  [] No  [x] Reviewed Prior HEP/Ed  Method of Education: [x] Verbal  [x] Demo  [] Written  Comprehension of Education:   [x] Verbalizes understanding. [x] Demonstrates understanding. [] Needs review. [] Demonstrates/verbalizes HEP/Ed previously given. Plan: [x] Continue per plan of care.    [x] Other: See separate PT progress note for this DOS      Treatment Charges: Mins Units   []  Modalities     [x]  Ther Exercise 21 1   []  Manual Therapy     []  Ther Activities     [x]  Aquatics 50 3   []  Neuromuscular     [] Vasocompression     [] Gait Training     [] Dry needling        [] 1 or 2 muscles        [] 3 or more muscles     []  Other     Total Treatment time 71 4     Time In: 245 PM            Time Out: 340 PM (-5 deep water hang)   PT Re-evaluation Time: 1:18pm-1:39pm(21' billed for skilled reassessment of all subjective & objective measures, all goals and extension of current POC) - see separate PT progress note    Physical therapy treatment completed today in part by physical therapist assistant (PTA). Treatment times reflect total treatment time combined by both PT and PTA for today's service.       Electronically signed by:  Geo Maria PTA  / Марина Jade PT

## 2022-09-09 ENCOUNTER — HOSPITAL ENCOUNTER (OUTPATIENT)
Dept: OCCUPATIONAL THERAPY | Age: 62
Setting detail: THERAPIES SERIES
Discharge: HOME | End: 2022-09-09
Payer: COMMERCIAL

## 2022-09-09 ENCOUNTER — CARE COORDINATION (OUTPATIENT)
Dept: OTHER | Facility: CLINIC | Age: 62
End: 2022-09-09

## 2022-09-09 ENCOUNTER — HOSPITAL ENCOUNTER (OUTPATIENT)
Dept: PHYSICAL THERAPY | Age: 62
Setting detail: THERAPIES SERIES
Discharge: HOME | End: 2022-09-09
Payer: COMMERCIAL

## 2022-09-09 PROCEDURE — 97113 AQUATIC THERAPY/EXERCISES: CPT

## 2022-09-09 PROCEDURE — 97110 THERAPEUTIC EXERCISES: CPT

## 2022-09-09 NOTE — FLOWSHEET NOTE
[facility]   [address] [place] [address] [address]   Phone: [phone]   Fax: [phone]    Physical Therapy Daily Treatment Note      Date:  2022  Patient Name:  Uvaldo Dunaway    :  1960  MRN: [id number]  Physician: Ngoc Case MD (resident physician)  Nolberto Najjar, MD (attending physician)                                  Insurance: [employer]. Auth required after 30 visits  Medical Diagnosis: M54.16 (ICD-10-CM) - Lumbar radiculopathy              Rehab Codes: M54.16  Onset Date: 2021 with recent exacerbation 2022                    Next 's appt: PCP 22; Neurosurgeon 22; neurology 10/2002  Visit# / total visits:  Cancels/No Shows: 2/0    Subjective: Pt reports feeling better then last visit. States increased soreness after last visit but no increased pain. Pain:  [x] Yes  [] No Location:(L) Lower back into buttock, (L) posterior/lateral thigh to knee; numbness in lower leg to ankle; R buttock and hip Pain Rating: (0-10 scale) 5/10  Pain altered Tx:  [x] No  [] Yes  Action:    Comments:     Objective:  Modalities:   Precautions:  Exercises:    [facility] Exercise Log  Aquatic, Hip & DLS Program- Phase 1    Date of Eval:                                Primary PT: Darrin Morton PT  Diagnosis:   Things to Focus On (goals):   Surgical Precautions:  Medical Precautions:  [] C-9 dates  [] Occ Med   [] Medicare       Date 22   Visit #    Walk F/L/R 2 Laps       (no rail) 2 Laps      (no rail) 2 Laps   (no rail) 2 Laps   (no rail) 2 Laps 2 Laps   Marching 2 Laps      (no rail) 2 Laps      (no rail) 2 Laps   (no rail) 2 Laps   (no rail) 2 Laps 2 Laps    Low Box Low Box Low Box Low Box Low box Low box   Squats 10x5\" 10x5\" 15x5\" 15x5\" 15x5\" 15x5\"   Step-Ups F/L Low 15x Low 10x Low 15x Low 15x Low 15x Low 15x []  Ther Exercise     []  Manual Therapy     []  Ther Activities     [x]  Aquatics 55 4   []  Neuromuscular     [] Vasocompression     [] Gait Training     [] Dry needling        [] 1 or 2 muscles        [] 3 or more muscles     []  Other     Total Treatment time 55 4     Time In:  0861           Time Out: 1998      Electronically signed by:  Kandace Frey PTA

## 2022-09-09 NOTE — FLOWSHEET NOTE
[] [place]       Occupational Therapy            1st floor       [address] [place], [place]         Phone: [phone]       Fax: [phone] [] [facility]  [address]  [place], [place]  Phone: [phone]  Fax: [phone]  [x] [facility] @ [facility]  [address] [facility] [address]  [address] [facility]  Phone [phone]  Fax [phone]      Occupational Therapy Daily Treatment Note    Date:  2022  Patient Name:  Jacque Borja    :  1960  MRN: [id number]  Referring Provider:  Benny Haji MD  Insurance: Other [facility]  Medical Diagnosis: Retrolisthesis of vertebrae (M43.10) Bilateral Carpal Tunnel (G56.03)     Rehab Codes: pain in right upper arm M79.621,, pain in left upper arm M79.622, , lack of coordination unspecified R27.0,, or parasthesia of skin R20.2,  Onset Date: 22                 Next Dr. Darryn Reardon: 22  Visit# / total visits: ; Progress note for Medicare patient due at visit 8-9    Cancels/No Shows: 0/0      Subjective:    Pain:  [] Yes  [x] No Location: N/A Pain Rating: (0-10 scale) 0/10  Pain altered Tx:  [x] No  [] Yes  Action:  Pt Comments: Pt states that she has no improvement w/ numbness and tingling. Pt reports that aquatic PT has been helpful in B UE. Pt reports that she is happy w/ the progress she has made w/ R UE.      Objective:  Modalities:  Precautions: N/A  Exercises:  Exrcise Reps/Time Weight/Level Comments   Tendon glide 10   Completed   Ulnar nerve glide 2x10   Shoulder 90 w/ elbow flex/ext   Completed   gripping 10 yellow Digiflex  Completed   Thumb opposition 10   Completed   Scapula retraction   3x10 Green   Completed    Shoulder extention 3x10   Green  Completed   Tricep extension 3x10 green    Clip placing  Yellow/red/green/Blue/Black Completed   [address] 4 rows  Placing pegs in board   Rows 3x10 green completed   PNF 3x10 Green rt UE going from rt shoulder to lt hip 10x , PNF diagonal lt UE going from lt shoudler to rt hip   Flexbar    Wrist Flexion/Ext  Wrist pronation  Wrist supination 2x20 red completed   Serratus anterior wall slide w/ lift off 2x10 AROM Completed    Other:      Treatment Charges: Mins Units   []  Modalities:      []  Ultrasound     [x]  Ther Exercise 40 3   []  Manual Therapy     []  Ther Activities     []  Orthotic fit/train     []  Orthotic recheck     []  Other     Total Treatment time 40 3       Assessment: [x] Progressing toward goals. Cont massage throughout ulnar nerve pathway. Cont w/ posture exercises of shoulder extension and scapular retractions. Cont flexbar exercises. Changed ulnar nerve glides to prayer stretch. Pt completed them w/out issue. Began serratus anterior wall slides w/ lift off. Pt tolerated tx well today w/out complaint. [] No change. [] Other     [x] Patient would continue to benefit from skilled occupational therapy services in order to: Improve, Increase, and Restore  functional /grasp, Motor control/Tone in UE, Strength, Activity tolerance, and Coordination deficit in order to improve functional use of UE in ADL performance and return to PLOF     STG/LTG  Short Term Goals: (  8    Treatments)  Decrease numbness/tingling per subjective report   Increase strength (pounds);  strength by 5 lbs  Increase pinch strength by 3lbs  Pt will increase 9-hole pef test by 10 seconds on each UE  Increase function:UE Functional Index Score 8 or more points to promote increased functional abilities  Demo knowledge of fall prevention in 2 sessions. Patient to be independent with home exercise program as demonstrated by performance with correct form without cues. Long Term Goals: (  16  Treatments)  Pt will increase 9 hole peg test time by 15 seconds  Pt will improve UEFI score by 15 points  Pt will increase  strength by 10 lbs  Pt will increase pinch strength by 5 lbs    Pt.  Education:  [x] Yes  [] No [] Reviewed Prior HEP/Ed  Method of Education: [x] Verbal  [x] Demo  [] Written  Re: Discussed the benefit of aquatic PT for the UE  Comprehension of Education:  [x] Verbalizes understanding. [x] Demonstrates understanding. [] Needs review. [] Demonstrates/verbalizes HEP/Ed previously given. Plan: [x] Continue current frequency toward short and long term goals. [x] Specific Instructions for subsequent treatments: Cont w/ nerve glides and strengthening.    [] Other:       Time In: 1000  Time Out: [address]      Electronically signed by:  Ezra Pacheco OTR/L

## 2022-09-13 ENCOUNTER — HOSPITAL ENCOUNTER (OUTPATIENT)
Dept: OCCUPATIONAL THERAPY | Age: 62
Setting detail: THERAPIES SERIES
Discharge: HOME | End: 2022-09-13
Payer: COMMERCIAL

## 2022-09-13 ENCOUNTER — HOSPITAL ENCOUNTER (OUTPATIENT)
Dept: PHYSICAL THERAPY | Age: 62
Setting detail: THERAPIES SERIES
Discharge: HOME | End: 2022-09-13
Payer: COMMERCIAL

## 2022-09-13 ENCOUNTER — APPOINTMENT (OUTPATIENT)
Dept: OCCUPATIONAL THERAPY | Age: 62
End: 2022-09-13
Payer: COMMERCIAL

## 2022-09-13 PROCEDURE — 97113 AQUATIC THERAPY/EXERCISES: CPT

## 2022-09-13 PROCEDURE — 97110 THERAPEUTIC EXERCISES: CPT

## 2022-09-13 NOTE — FLOWSHEET NOTE
[] [place]       Occupational Therapy            1st floor       [address] [place], [place]         Phone: [phone]       Fax: [phone] [] [facility]  [address]  [place], [place]  Phone: [phone]  Fax: [phone]  [x] [facility] ([place] [facility]) @ [facility]  [address] [facility] [address]  [place], [address] [place] [address]  Phone [phone]  Fax [phone]      Occupational Therapy Daily Treatment Note    Date:  2022  Patient Name:  Blanca Elizondo    :  1960  MRN: [id number]  Referring Provider:  Erwin Lilly MD  Insurance: Other [facility]  Medical Diagnosis: Retrolisthesis of vertebrae (M43.10) Bilateral Carpal Tunnel (G56.03)     Rehab Codes: pain in right upper arm M79.621,, pain in left upper arm M79.622, , lack of coordination unspecified R27.0,, or parasthesia of skin R20.2,  Onset Date: 22                 Next Dr. Rosalina Boogie: 22  Visit# / total visits: ; Progress note for Medicare patient due at visit 8-9    Cancels/No Shows: 0/0      Subjective:    Pain:  [] Yes  [x] No Location: N/A Pain Rating: (0-10 scale) 0/10  Pain altered Tx:  [x] No  [] Yes  Action:  Pt Comments: Pt reports R arm is doing better but L arm is still the same. Pt reports prayer ulnar nerve glide has been more effective than other glides. Pt reports aquatic PT has been beneficial for B UE. Pt reports helping at [employer] over the weekend and was sore for 2 days but is feeling back to normal today.     Objective:  Modalities:  Precautions: N/A  Exercises:  Exrcise Reps/Time Weight/Level Comments   Tendon glide 10   Completed   Ulnar nerve glide 2x10   Shoulder 90 w/ elbow flex/ext   Completed   gripping 2 minutes yellow Theraputty    Thumb opposition 10   Completed   Scapula retraction   3x10 Green   Completed    Shoulder extention 3x10   Green  Completed   Tricep extension 3x10 green    Clip placing Yellow/red/green/Blue/Black Completed   [facility] 4 rows   Not completed   Placing pegs in board   Rows 3x10 green completed   PNF 3x10 Green rt UE going from rt shoulder to lt hip 10x , PNF diagonal lt UE going from lt shoudler to rt hip   Flexbar    Wrist Flexion/Ext  Wrist pronation  Wrist supination 2x20 red completed   Serratus anterior wall slide w/ lift off 2x10 AROM Completed    Other:      Treatment Charges: Mins Units   []  Modalities:      []  Ultrasound     [x]  Ther Exercise 40 3   []  Manual Therapy     []  Ther Activities     []  Orthotic fit/train     []  Orthotic recheck     []  Other     Total Treatment time 40 3       Assessment: [x] Progressing toward goals. Cont massage throughout ulnar nerve pathway. Cont w/ posture exercises of shoulder extension and scapular retractions. Cont flexbar exercises. Changed ulnar nerve glides to prayer stretch. Pt completed them w/out issue. cont serratus anterior wall slides w/ lift off. Issued yellow theraputty on this date due to subjective report of sponge being \"easy\". Pt tolerated tx well today w/out complaint. [] No change. [] Other     [x] Patient would continue to benefit from skilled occupational therapy services in order to: Improve, Increase, and Restore  functional /grasp, Motor control/Tone in UE, Strength, Activity tolerance, and Coordination deficit in order to improve functional use of UE in ADL performance and return to PLOF     STG/LTG  Short Term Goals: (  8    Treatments)  Decrease numbness/tingling per subjective report   Increase strength (pounds);  strength by 5 lbs  Increase pinch strength by 3lbs  Pt will increase 9-hole pef test by 10 seconds on each UE  Increase function:UE Functional Index Score 8 or more points to promote increased functional abilities  Demo knowledge of fall prevention in 2 sessions. Patient to be independent with home exercise program as demonstrated by performance with correct form without cues.      Long Term Goals: (  16  Treatments)  Pt will increase 9 hole peg test time by 15 seconds  Pt will improve UEFI score by 15 points  Pt will increase  strength by 10 lbs  Pt will increase pinch strength by 5 lbs    Pt. Education:  [x] Yes  [] No  [] Reviewed Prior HEP/Ed  Method of Education: [x] Verbal  [x] Demo  [] Written  Re: issued yellow theraputty. Comprehension of Education:  [x] Verbalizes understanding. [x] Demonstrates understanding. [] Needs review. [] Demonstrates/verbalizes HEP/Ed previously given. Plan: [x] Continue current frequency toward short and long term goals. [x] Specific Instructions for subsequent treatments: Cont w/ nerve glides and strengthening.    [] Other:       Time In: 1520  Time Out: 1600      Electronically signed by:  YAAKOV Merritt/CARMEN

## 2022-09-13 NOTE — FLOWSHEET NOTE
[facility]  Outpatient Physical Therapy   [address] [facility] [address]   Phone: [phone]   Fax: [phone]    Physical Therapy Daily Treatment Note      Date:  2022  Patient Name:  Heydi Ramirez    :  1960  MRN: [id number]  Physician: Bridget De La Garza MD (resident physician)  Gerson Garland MD (attending physician)                                  Insurance: [employer]. Auth required after 30 visits  Medical Diagnosis: M54.16 (ICD-10-CM) - Lumbar radiculopathy              Rehab Codes: M54.16  Onset Date: 2021 with recent exacerbation 2022                    Next 's appt: PCP 10/2022; Neurosurgeon 22; neurology 2002  Visit# / total visits:  Cancels/No Shows: 2/0    Subjective: Reports standing a lot over the weekend working the [employer]- stood for 3 hours + in one place and lifting one garbage bag - pain increased up to 8/10 from lower back into buttocks down to (B) knees. Reports she felt a lump in left lower back yesterday which has since subsided- denies use of ice but took tylenol PRN- finally feeling a little better today. Feels OT has been helping with hand/UE weakness. Reports she has increased her walking/activity at home - feels she is tolerating about 5' of walking without increasing pain. Does not use cane at home but still has been taking it into public/long distance for support     Pain:  [x] Yes  [] No Location:(L) Lower back into buttock, (L) posterior/lateral thigh to knee; numbness in lower leg to ankle; R buttock and hip- denies symptoms to knees Pain Rating: (0-10 scale) 6/10  Pain altered Tx:  [x] No  [] Yes  Action:    Comments:  Arrives to pool deck with use of straight cane for support.      Objective:  Modalities:   Precautions:  Exercises:    [address] Services Exercise Log  Aquatic, Hip & DLS Program- Phase 1    Date of Eval:                                Primary PT: Marga Anderson DYANA Edwards  Diagnosis: Things to Focus On (goals):   Surgical Precautions:  Medical Precautions:  [] C-9 dates  [] Occ Med   [] Medicare       Date 9/6/22 9/9/22 9/13/22    Visit # 25/26 26/36 27/36    Walk F/L/R 2 Laps 2 Laps 1 Lap    Marching 2 Laps 2 Laps 1 Lap     Low box Low box Low Box- Try No Hands    Squats 15x5\" 15x5\" 15x5\"    Step-Ups F/L Low 15x Low 15x Low 15x    Step Down F/L       Heel/Toe Raise Floor 15x Floor 15x Low Box 11x    SLR F/L/R 15x 15x 15x    Hip/Knee Flex/Ext 15x 15x 15x    F/L Lunges Low 10x Low 10x Low 12x    Figure 8s              Kickboard Ex. Med Med Med    Iso Abd.  10x5\" 10x5\" 10x5\"    Push-pull 15x 15x 15x    Paddling 15x 15x 15x           UE Format: 2 Sm Balls 2 Sm Balls 2 Small Balls Paddles Level 1? Horiz Abd/Add 15x 15x 1' (18 )    IR/ER (wipers) 15x 15x 1'    Alt Flex/Ext 15x 15x 1' (21)    Alt Press Down 15x 15x 1'    Abd/Add 15x 15x 1' (29)            Deep Water: 1 Noodle 1 Noodle 1 Noodle    Hang 5' 5' 2'    Cycling 2' 2' 2'    Jacks 2' 2' 1'    X-Country 2' 2' No time           Balance       SLS              Stretches       Achllies       Hamstring 2x30\" 2x30\" No Time    Piriformis              Breast Stroke 2 Laps 2 Laps No time    Pain Rating 7 5 6        Specific Instructions for next treatment: Attempt Paddles with UE format    Assessment: [x] Progressing toward goals. Continued to challenge balance and core stability by encouraging increased speed and little to no UE support during SL WB LE exercise- patient notes significant weakness/instability with L LE compared to R LE even at 35-40% WB in an aquatic environment. However, patient does demonstrate improved ability to perform activity without UE support and with increased confidence in regards to balance. Slight discomfort down L LE with stepping up onto low box with R LE- noted pain increase with engaging her core. Continues to require extra time to complete program due to slower paced with activity. [] No change. [x] Other: Skilled PT intervention is required to help centralize/modulate sx, improve pain free lumbar AROM and maximize neuromuscular strength & stability necessary for unrestricted ADL/ work tolerances at her prior level of function. STG: (to be met in 18 treatments)  Pt will self report worst pain no greater than 3/10 in order to better tolerate ADLs/work activities with minimal dysfunction GOAL PROGRESSING AND EXTENDED 9/6  Pt will improve lumbar AROM to 90% or greater in all planes in order to demonstrate ability to move/reach in all planes unrestricted at [facility] 91 9/6  Pt will improve trunk strength to 4/5 or greater in all planes to show improved stability at prior level GOAL PROGRESSING AND EXTENDED 9/6  Pt will self report reduced peripheral L LE pain (no distal than L knee) to demonstrate initial  favorable centralization response to therapy GOAL MET 9/6  LTG: (to be met in 26 treatments)  Pt will demonstrate improved B LE strength to 4/5 or greater in order to demonstrate improved stability/strength necessary for unrestricted ADLs/work activities GOAL PROGRESSING AND EXTENDED 9/6  Pt will self report ability to complete all work duties with pain no greater than 1/10 to demonstrate unrestricted functional tolerances at prior level GOAL PROGRESSING AND EXTENDED 9/6  Pt will improve core strength Sahrmann Grade 2/5 or greater to demonstrate better support and stability to lumbar spine GOAL PROGRESSING AND EXTENDED 9/6  Pt will decrease SHEY to 10% impaired or less in order to demonstrate improved functional tolerances at PLOF with minimal restriction/dysfunction GOAL PROGRESSING AND EXTENDED 9/6  Pt will demonstrate independence with a long term HEP for continued progress/maintenance after completion of PT GOAL PROGRESSING AND EXTENDED 9/6      Patient Goals/Rehab Expectations: To alleviate pain and get back to normal    Pt.  Education:  [] Yes  [] No  [x] Reviewed Prior HEP/Ed  Method of Education: [x] Verbal  [x] Demo  [] Written  Comprehension of Education:   [x] Verbalizes understanding. [x] Demonstrates understanding. [] Needs review. [] Demonstrates/verbalizes HEP/Ed previously given. Plan: [x] Continue per plan of care.    [] Other:       Treatment Charges: Mins Units   []  Modalities     []  Ther Exercise     []  Manual Therapy     []  Ther Activities     [x]  Aquatics 48 3   []  Neuromuscular     [] Vasocompression     [] Gait Training     [] Dry needling        [] 1 or 2 muscles        [] 3 or more muscles     []  Other     Total Treatment time 48 3     Time In: 407 PM          Time Out: 458 PM      Electronically signed by:  Amna Jesus PTA

## 2022-09-15 ENCOUNTER — HOSPITAL ENCOUNTER (OUTPATIENT)
Dept: NEUROLOGY | Age: 62
Discharge: HOME | End: 2022-09-15
Payer: COMMERCIAL

## 2022-09-15 DIAGNOSIS — G56.01 CARPAL TUNNEL SYNDROME OF RIGHT WRIST: ICD-10-CM

## 2022-09-15 PROCEDURE — 95886 MUSC TEST DONE W/N TEST COMP: CPT | Performed by: PHYSICAL MEDICINE & REHABILITATION

## 2022-09-15 PROCEDURE — 95911 NRV CNDJ TEST 9-10 STUDIES: CPT | Performed by: PHYSICAL MEDICINE & REHABILITATION

## 2022-09-16 ENCOUNTER — HOSPITAL ENCOUNTER (OUTPATIENT)
Dept: PHYSICAL THERAPY | Age: 62
Setting detail: THERAPIES SERIES
Discharge: HOME | End: 2022-09-16
Payer: COMMERCIAL

## 2022-09-16 ENCOUNTER — HOSPITAL ENCOUNTER (OUTPATIENT)
Dept: OCCUPATIONAL THERAPY | Age: 62
Setting detail: THERAPIES SERIES
Discharge: HOME | End: 2022-09-16
Payer: COMMERCIAL

## 2022-09-16 ENCOUNTER — APPOINTMENT (OUTPATIENT)
Dept: OCCUPATIONAL THERAPY | Age: 62
End: 2022-09-16
Payer: COMMERCIAL

## 2022-09-16 PROCEDURE — 97113 AQUATIC THERAPY/EXERCISES: CPT

## 2022-09-16 PROCEDURE — 97110 THERAPEUTIC EXERCISES: CPT

## 2022-09-16 ASSESSMENT — PAIN SCALES - GENERAL: PAINLEVEL_OUTOF10: 3

## 2022-09-16 ASSESSMENT — PAIN DESCRIPTION - LOCATION: LOCATION: HAND;LEG

## 2022-09-16 ASSESSMENT — PAIN DESCRIPTION - FREQUENCY: FREQUENCY: CONTINUOUS

## 2022-09-16 ASSESSMENT — PAIN DESCRIPTION - PAIN TYPE: TYPE: ACUTE PAIN

## 2022-09-16 ASSESSMENT — PAIN DESCRIPTION - DESCRIPTORS: DESCRIPTORS: NUMBNESS;SORE

## 2022-09-16 ASSESSMENT — PAIN DESCRIPTION - ORIENTATION: ORIENTATION: RIGHT;LEFT

## 2022-09-16 NOTE — FLOWSHEET NOTE
[address] [place] [address]  Outpatient Physical Therapy   [address] [place] [address] [address]   Phone: [phone]   Fax: [phone]    Physical Therapy Daily Treatment Note      Date:  2022  Patient Name:  Randell Kim    :  1960  MRN: [id number]  Physician: Mayra Camargo MD (resident physician)  Tariq Patel MD (attending physician)                                  Insurance: [employer]. Auth required after 30 visits  Medical Diagnosis: M54.16 (ICD-10-CM) - Lumbar radiculopathy              Rehab Codes: M54.16  Onset Date: 2021 with recent exacerbation 2022                    Next 's appt: PCP 10/2022; Neurosurgeon 22; neurology 2002  Visit# / total visits:  Cancels/No Shows: 2/0    Subjective: Pt reports no change in symptoms for lumbar back and LEs. Still having numbness into left ankle. States no issues after last visit but notes some fatigue. Reports UEs more irritated today due to EMG performed yesterday     Pain:  [x] Yes  [] No Location:(L) Lower back into buttock, (L) posterior/lateral thigh to knee; numbness in lower leg to ankle; R buttock and hip- denies symptoms to knees Pain Rating: (0-10 scale) 3/10  Pain altered Tx:  [x] No  [] Yes  Action:    Comments:  Arrives to pool deck with use of straight cane for support. Objective:  Modalities:   Precautions:  Exercises:    [address] Services Exercise Log  Aquatic, Hip & DLS Program- Phase 1    Date of Eval:                                Primary PT: Cresencio Allen PT  Diagnosis:   Things to Focus On (goals):   Surgical Precautions:  Medical Precautions:  [] C-9 dates  [] Occ Med   [] Medicare       Date 22   Visit #    Walk F/L/R 2 Laps 2 Laps 1 Lap 2 Laps   Marching 2 Laps 2 Laps 1 Lap 2 Laps    Low box Low box Low Box- Try No Hands Low box- Try No hands   Squats 15x5\" 15x5\" 15x5\" 15x5\"   Step-Ups F/L Low 15x Low 15x to move/reach in all planes unrestricted at [facility] 91 9/6  Pt will improve trunk strength to 4/5 or greater in all planes to show improved stability at prior level GOAL PROGRESSING AND EXTENDED 9/6  Pt will self report reduced peripheral L LE pain (no distal than L knee) to demonstrate initial  favorable centralization response to therapy GOAL MET 9/6  LTG: (to be met in 26 treatments)  Pt will demonstrate improved B LE strength to 4/5 or greater in order to demonstrate improved stability/strength necessary for unrestricted ADLs/work activities GOAL PROGRESSING AND EXTENDED 9/6  Pt will self report ability to complete all work duties with pain no greater than 1/10 to demonstrate unrestricted functional tolerances at prior level GOAL PROGRESSING AND EXTENDED 9/6  Pt will improve core strength Sahrmann Grade 2/5 or greater to demonstrate better support and stability to lumbar spine GOAL PROGRESSING AND EXTENDED 9/6  Pt will decrease SHEY to 10% impaired or less in order to demonstrate improved functional tolerances at PLOF with minimal restriction/dysfunction GOAL PROGRESSING AND EXTENDED 9/6  Pt will demonstrate independence with a long term HEP for continued progress/maintenance after completion of PT GOAL PROGRESSING AND EXTENDED 9/6      Patient Goals/Rehab Expectations: To alleviate pain and get back to normal    Pt. Education:  [] Yes  [] No  [x] Reviewed Prior HEP/Ed  Method of Education: [x] Verbal  [x] Demo  [] Written  Comprehension of Education:   [x] Verbalizes understanding. [x] Demonstrates understanding. [] Needs review. [] Demonstrates/verbalizes HEP/Ed previously given. Plan: [x] Continue per plan of care.    [] Other:       Treatment Charges: Mins Units   []  Modalities     []  Ther Exercise     []  Manual Therapy     []  Ther Activities     [x]  Aquatics 58 4   []  Neuromuscular     [] Vasocompression     [] Gait Training     [] Dry needling        [] 1 or 2 muscles [] 3 or more muscles     []  Other     Total Treatment time 58 4     Time In: 5840          Time Out: 1144      Electronically signed by:  Bakari Bates PTA

## 2022-09-16 NOTE — PROGRESS NOTES
[address]         Phone: [phone]       Fax: [phone]     Occupational Therapy Daily Treatment note     Occupational Therapy: Daily Note   Patient: Tammy White (50 y.o. female)   Examination Date: 2022  No data recorded  No data recorded   :  1960 # of Visits since [facility]:   Visit count could not be calculated. Make sure you are using a visit which is associated with an episode. MRN: [id number]  CSN: [id number] Start of Care Date: 2022   Insurance: Payor: Susana Salt / Plan: [employer] [address] / Product Type: *No Product type* /   Insurance ID: [id number] - ([employer] Data) Secondary Insurance (if applicable): Insurance Information: [address]   Referring Physician: MD Paty Granda MD   PCP: Cain Thompson MD Visits to Date/Visits Approved:     No Show/Cancelled Appts:   /       Medical Diagnosis: Radiculopathy, lumbar region [M54.16] Retrolisthesis of vertebrae (M43.10) Bilateral Carpal Tunnel (G56.03)  Treatment Diagnosis: pain in right upper arm M79.621,, pain in left upper arm M79.622, , lack of coordination unspecified R27.0,, or parasthesia of skin R20.2,        SUBJECTIVE EXAMINATION   Pain Level: Pain Screening  Patient Currently in Pain: Yes  Pain Assessment: 0-10  Pain Level: 3  Post Treatment Pain Level: 3  Patient's Stated Pain Goal: 0 - No pain  Pain Type: Acute pain  Pain Location: Hand, Leg  Pain Orientation: Right, Left  Pain Descriptors: Numbness, Sore (numbness in fingers)  Pain Frequency: Continuous    Patient Comments: Subjective: Pt states she is going without her cane today. Pt states she is doing her HEP. Pt states she had EMG and their is a deficiency in lt hand. Pt states Tame[employer] Barnes is having fall harvest festival this weekend.     HEP Compliance: Good        OBJECTIVE EXAMINATION   Restrictions: Restrictions/Precautions: General Precautions          TREATMENT     Exercises:     Treatment Reasoning    OT Exercise 1: Massage bilateral hands/forearms, PROM/stretching bilateral wrist/fingers. bilateral hand tendon gliding /finger blocking exercises: PIPs flexion/extension 3 sets 10x (OT stabilzed proximal phalanxes ), DIPs flex/extension 2 sets 10x (OT stabilzed proximal & middle phalanxes). OT Exercise 2: Green theratubing: bilateral scapula retraction 3 sets of 10x,bilateral shoulder extension 3sets of 10x, PNF diagonal rt UE going from rt shoulder to lt hip 10x , PNF diagonal lt UE going from lt shoudler to rt hip, 3 sets 10 triceps extension  OT Exercise 3: small colored pegs: using rt hand place 4 rows of 10 pegs each in board, then remove pegs & toss in container, using lt hand place 4 rows of 10 pegs each in board, then remove pegs & toss in container. OT Exercise 4: graded clothespins 6 yellow 1#, 6 red 2#, 6 green 4#, 6 blue 6# 6 black 8# -using rt hand place/remove,  6 yellow, 6 red , 5 green using lt hand place/remove  OT Exercise 5: yellow 1.5# digiflex : bilateral hand gripping 10x, bilateral  hand each finger opposition pinch 10x  OT Exercise 6: Ulnar nerve glide 2x10   Shoulder 90 w/ elbow flex/ext  rt/lt  OT Exercise 7: Thumb opposition 10  each    Limitations addressed: Coordination, Flexibility, Strength  Limitations addressed: Bilateral hand weakness,impaired fine motor coordination                            ASSESSMENT     Assessment: Assessment: OT tx focus on tendon & nerve glides, massage & PROM bilateral hands to decrease the tightness. Pt completes bilateral UE/hand strengtheing exercises. Pt has a little difficulty using rt hand for oppositional pinch with digiflex. Pt demo difficulty using lt hand to pinch green clothespins & unable to pinch blue higher resistance clothespins using lt hand. Pt reports pain the same at end of tx. See OT exercises for details.  OT instructed pt to pace at home when shredding cheese later because she has a lot to do.  .  Performance deficits / Impairments: Decreased coordination, Decreased sensation, Decreased strength, Decreased high-level IADLs    Post-Treatment Pain Level: 3    Prognosis: Good    Activity Tolerance: Patient tolerated treatment well             GOALS      TREATMENT PLAN   REQUIRES OT FOLLOW-UP: Yes  Type: Outpatient  Plan  Plan Frequency: 2x/week  Plan Weeks: LT goals for 16 visits  Current Treatment Recommendations: Strengthening, Coordination training, Patient/Caregiver education & training, ROM (sensory nerve glides)  Plan Comment: continue OT       Therapy Time  Individual Time In: 0930  Individual Time Out: 1020  Minutes: 50  Timed Code Treatment Minutes: 50 Minutes       Electronically signed by Paresh Plaza OT  on 9/16/2022 at 10:31 AM       Treatment Charges: Mins Units Time In/Out   [] Evaluation       []  Low       []  Moderate       []  High      []  Electrical Stim      []  Iontophoresis      []  Paraffin      []  Ultrasound        []  Masage      []  Neuromuscular Re-ed      []  ADL      [x]  Ther Exercise 50 3    []  Ther Activities      []  Neuro Re-Ed      []  Splinting      []  Other      Total Treatment time 50 min 3          POC NOTE

## 2022-09-20 ENCOUNTER — APPOINTMENT (OUTPATIENT)
Dept: OCCUPATIONAL THERAPY | Age: 62
End: 2022-09-20
Payer: COMMERCIAL

## 2022-09-20 ENCOUNTER — HOSPITAL ENCOUNTER (OUTPATIENT)
Dept: OCCUPATIONAL THERAPY | Age: 62
Setting detail: THERAPIES SERIES
Discharge: HOME | End: 2022-09-20
Payer: COMMERCIAL

## 2022-09-20 ENCOUNTER — HOSPITAL ENCOUNTER (OUTPATIENT)
Dept: PHYSICAL THERAPY | Age: 62
Setting detail: THERAPIES SERIES
Discharge: HOME | End: 2022-09-20
Payer: COMMERCIAL

## 2022-09-20 DIAGNOSIS — F33.9 RECURRENT MAJOR DEPRESSIVE DISORDER, REMISSION STATUS UNSPECIFIED (HCC): ICD-10-CM

## 2022-09-20 PROCEDURE — 97113 AQUATIC THERAPY/EXERCISES: CPT

## 2022-09-20 PROCEDURE — 97110 THERAPEUTIC EXERCISES: CPT

## 2022-09-20 NOTE — FLOWSHEET NOTE
[facility]  Outpatient Physical Therapy   [address] [facility] [address]   Phone: [phone]   Fax: [phone]    Physical Therapy Daily Treatment Note      Date:  2022  Patient Name:  Skye Cantrell    :  1960  MRN: [id number]  Physician: Sherry Barfield MD (resident physician)  Rocio Figueredo MD (attending physician)                                  Insurance: [employer]. Auth required after 30 visits  Medical Diagnosis: M54.16 (ICD-10-CM) - Lumbar radiculopathy              Rehab Codes: M54.16  Onset Date: 2021 with recent exacerbation 2022                    Next 's appt: PCP 10/2022; Neurosurgeon 22; neurology 2022  Visit# / total visits:  Cancels/No Shows: 2/0    Subjective: Denies issues after last visit but reports she over did it over the weekend and is having more pain today. She had another Mac/ off so she spent a lot of time on her feet/ up and down. Arrives to pool area with use of straight cane. To see neurosurgeon 22. Expresses frustration due to inability to tolerate activity like she used to without increasing pain significantly     Pain:  [x] Yes  [] No Location:(L) Lower back into buttock, (L) posterior/lateral thigh to knee; numbness in lower leg to ankle; R buttock and hip- denies symptoms to knees Pain Rating: (0-10 scale) 6/10  Pain altered Tx:  [x] No  [] Yes  Action:    Comments:      Objective:  Modalities:   Precautions:  Exercises:    [address] [facility] J Exercise Log  Aquatic, Hip & DLS Program- Phase 1    Date of Eval:                                Primary PT: Jeffrey Hood PT  Diagnosis:   Things to Focus On (goals):   Surgical Precautions:  Medical Precautions:  [] C-9 dates  [] Occ Med   [] Medicare       Date 22    Visit # 25/36     Walk F/L/R 2 Laps 2 Laps 1 Lap 2 Laps 2 Laps    Marching 2 Laps 2 Laps 1 Lap 2 neuromuscular strength & stability necessary for unrestricted ADL/ work tolerances at her prior level of function. STG: (to be met in 18 treatments)  Pt will self report worst pain no greater than 3/10 in order to better tolerate ADLs/work activities with minimal dysfunction GOAL PROGRESSING AND EXTENDED 9/6  Pt will improve lumbar AROM to 90% or greater in all planes in order to demonstrate ability to move/reach in all planes unrestricted at [facility] 91 9/6  Pt will improve trunk strength to 4/5 or greater in all planes to show improved stability at prior level GOAL PROGRESSING AND EXTENDED 9/6  Pt will self report reduced peripheral L LE pain (no distal than L knee) to demonstrate initial  favorable centralization response to therapy GOAL MET 9/6  LTG: (to be met in 26 treatments)  Pt will demonstrate improved B LE strength to 4/5 or greater in order to demonstrate improved stability/strength necessary for unrestricted ADLs/work activities GOAL PROGRESSING AND EXTENDED 9/6  Pt will self report ability to complete all work duties with pain no greater than 1/10 to demonstrate unrestricted functional tolerances at prior level GOAL PROGRESSING AND EXTENDED 9/6  Pt will improve core strength Sahrmann Grade 2/5 or greater to demonstrate better support and stability to lumbar spine GOAL PROGRESSING AND EXTENDED 9/6  Pt will decrease SHEY to 10% impaired or less in order to demonstrate improved functional tolerances at PLOF with minimal restriction/dysfunction GOAL PROGRESSING AND EXTENDED 9/6  Pt will demonstrate independence with a long term HEP for continued progress/maintenance after completion of PT GOAL PROGRESSING AND EXTENDED 9/6      Patient Goals/Rehab Expectations: To alleviate pain and get back to normal    Pt. Education:  [] Yes  [] No  [x] Reviewed Prior HEP/Ed  Method of Education: [x] Verbal  [x] Demo  [] Written  Comprehension of Education:   [x] Verbalizes understanding.   [x]

## 2022-09-20 NOTE — FLOWSHEET NOTE
[] [place]       Occupational Therapy            1st floor       [address] [place], [place]         Phone: [phone]       Fax: [phone] [] [facility]  [address]  [place], [place]  Phone: [phone]  Fax: [phone]  [x] [facility] ([facility] @ [facility]  [address] [facility] [address]  [address]  Phone [phone]  Fax [phone]      Occupational Therapy Daily Treatment Note    Date:  2022  Patient Name:  Cale Mcbride    :  1960  MRN: [id number]  Referring Provider:  Tacos Craig MD  Insurance: Other [facility]  Medical Diagnosis: Retrolisthesis of vertebrae (M43.10) Bilateral Carpal Tunnel (G56.03)     Rehab Codes: pain in right upper arm M79.621,, pain in left upper arm M79.622, , lack of coordination unspecified R27.0,, or parasthesia of skin R20.2,  Onset Date: 22                 Next  [address]: 22  Visit# / total visits: ; Progress note for Medicare patient due at visit 8-9    Cancels/No Shows: 0/0      Subjective:    Pain:  [] Yes  [x] No Location: N/A Pain Rating: (0-10 scale) 6/10  Pain altered Tx:  [x] No  [] Yes  Action:  Pt Comments: Pt reports L arm 6/10 pn. Pt thinks she over did it over the weekend. Pt reports she had her EMG last weeks and her L hand has been more numb since then.      Objective:  Modalities:  Precautions: N/A  Exercises:  Exrcise Reps/Time Weight/Level Comments   Tendon glide 10   Completed   Ulnar nerve glide 2x10   Shoulder 90 w/ elbow flex/ext   Completed   gripping 2 minutes yellow Theraputty    Thumb opposition 10   Completed   Scapula retraction   3x10 Green   Completed    Shoulder extention 3x10   Green  Completed   Tricep extension 3x10 green    Clip placing  Yellow/red/green/Blue/Black Completed   [address] 4 rows   Not completed   Placing pegs in board   Rows 3x10 green completed   PNF 3x10 Green rt UE going from rt shoulder to lt hip 10x , PNF diagonal lt UE going from lt shoudler to rt hip   Flexbar    Wrist Flexion/Ext  Wrist pronation  Wrist supination 3x10 red completed   Serratus anterior wall slide w/ lift off 2x10 AROM Completed    Other:      Treatment Charges: Mins Units   []  Modalities:      []  Ultrasound     [x]  Ther Exercise 40 3   []  Manual Therapy     []  Ther Activities     []  Orthotic fit/train     []  Orthotic recheck     []  Other     Total Treatment time 40 3       Assessment: [x] Progressing toward goals. Cont massage throughout ulnar nerve pathway. Cont w/ posture exercises of shoulder extension and scapular retractions. Cont flexbar exercises. cont ulnar nerve glides (prayer stretch). Pt completed them w/out issue. cont serratus anterior wall slides w/ lift off. Issued yellow theraputty on this date due to subjective report of sponge being \"easy\". Pt tolerated tx well today w/out complaint. [] No change. [] Other     [x] Patient would continue to benefit from skilled occupational therapy services in order to: Improve, Increase, and Restore  functional /grasp, Motor control/Tone in UE, Strength, Activity tolerance, and Coordination deficit in order to improve functional use of UE in ADL performance and return to PLOF     STG/LTG  Short Term Goals: (  8    Treatments)  Decrease numbness/tingling per subjective report   Increase strength (pounds);  strength by 5 lbs  Increase pinch strength by 3lbs  Pt will increase 9-hole pef test by 10 seconds on each UE  Increase function:UE Functional Index Score 8 or more points to promote increased functional abilities  Demo knowledge of fall prevention in 2 sessions. Patient to be independent with home exercise program as demonstrated by performance with correct form without cues.      Long Term Goals: (  16  Treatments)  Pt will increase 9 hole peg test time by 15 seconds  Pt will improve UEFI score by 15 points  Pt will increase  strength by 10 lbs  Pt will increase pinch strength by 5 lbs    Pt. Education:  [x] Yes  [] No  [] Reviewed Prior HEP/Ed  Method of Education: [x] Verbal  [x] Demo  [] Written  Re: issued yellow theraputty. Comprehension of Education:  [x] Verbalizes understanding. [x] Demonstrates understanding. [] Needs review. [] Demonstrates/verbalizes HEP/Ed previously given. Plan: [x] Continue current frequency toward short and long term goals. [x] Specific Instructions for subsequent treatments: Cont w/ nerve glides and strengthening.    [] Other:       Time In: 0143 Time Out: 8479      Electronically signed by:  Roger Mckeon OTR/CARMEN

## 2022-09-21 DIAGNOSIS — E11.9 TYPE 2 DIABETES MELLITUS WITHOUT COMPLICATION, WITHOUT LONG-TERM CURRENT USE OF INSULIN (HCC): ICD-10-CM

## 2022-09-21 RX ORDER — DULAGLUTIDE 1.5 MG/.5ML
INJECTION, SOLUTION SUBCUTANEOUS
Qty: 6 ML | Refills: 2 | Status: SHIPPED | OUTPATIENT
Start: 2022-09-21

## 2022-09-21 NOTE — TELEPHONE ENCOUNTER
Please address the medication refill and close the encounter. If I can be of assistance, please route to the applicable pool. Thank you. Last visit: 8-25-22  Last Med refill: 7-2-22  Does patient have enough medication for 72 hours: No:     Next Visit Date:  Future Appointments   Date Time Provider Nicola Zaidi   9/22/2022 10:30 AM Pattie Jimenes DO [place] [facility]   9/23/2022  9:30 AM Desma Para, OT [facility] [place]   9/23/2022 10:30 AM Gautam Asp, PTA [facility] [place]   9/27/2022  2:30 PM Desma Para, OT [facility] [place]   9/27/2022  3:30 PM Sanjuanita Julian, PTA [facility] [place]   9/30/2022  9:00 AM Desma Para, OT [facility] [place]   9/30/2022  9:45 AM Sachi Santiago, PTA [facility] [place]   10/25/2022  9:30 AM Arcenio Marshall MD [facility]   11/9/2022  2:00 PM Mo'Rosanne Torres MD [address] Maintenance   Topic Date Due    Shingles vaccine (1 of 2) Never done    Diabetic retinal exam  11/01/2017    Diabetic foot exam  12/01/2017    COVID-19 Vaccine (3 - Booster for Moderna series) 07/11/2021    Diabetic microalbuminuria test  06/11/2022    Lipids  06/24/2022    Flu vaccine (1) 09/01/2022    Colorectal Cancer Screen  09/20/2022    Breast cancer screen  11/14/2022    Depression Monitoring  05/26/2023    A1C test (Diabetic or Prediabetic)  08/25/2023    DTaP/Tdap/Td vaccine (3 - Td or Tdap) 08/25/2032    Pneumococcal 0-64 years Vaccine  Completed    Hepatitis C screen  Completed    HIV screen  Completed    Hepatitis A vaccine  Aged Out    Hib vaccine  Aged Out    Meningococcal (ACWY) vaccine  Aged Out       Hemoglobin A1C (%)   Date Value   08/25/2022 6.9   05/26/2022 7.9   09/14/2021 7.7             ( goal A1C is < 7)   Microalb/Crt.  Ratio (mcg/mg creat)   Date Value   06/11/2021 CANNOT BE CALCULATED     LDL Cholesterol (mg/dL)   Date Value   06/24/2021 52   04/20/2021 48       (goal LDL is <100)   AST (U/L)   Date

## 2022-09-21 NOTE — TELEPHONE ENCOUNTER
Last visit: 8/25/22  Last Med refill: 8/30/22  Does patient have enough medication for 72 hours: Yes    Next Visit Date:  Future Appointments   Date Time Provider Nicola Zaidi   9/22/2022 10:30 AM DO SHANNON [place] [facility]   9/23/2022  9:30 AM Radha Jumbo, OT [facility] [place]   9/23/2022 10:30 AM Skye Mccord, PTA [facility] [place]   9/27/2022  2:30 PM Radha Jumbo, OT [facility] [place]   9/27/2022  3:30 PM Adalberto Nogueira, PTA [facility] [place]   9/30/2022  9:00 AM Radha Jumbo, OT [facility] [place]   9/30/2022  9:45 AM Claribel Perez, PTA [facility] [place]   10/25/2022  9:30 AM Nadeen Barker MD [facility] FP [facility]   11/9/2022  2:00 PM Omkar Boyle MD Neuro [address] Maintenance   Topic Date Due    Shingles vaccine (1 of 2) Never done    Diabetic retinal exam  11/01/2017    Diabetic foot exam  12/01/2017    COVID-19 Vaccine (3 - Booster for Moderna series) 07/11/2021    Diabetic microalbuminuria test  06/11/2022    Lipids  06/24/2022    Flu vaccine (1) 09/01/2022    Colorectal Cancer Screen  09/20/2022    Breast cancer screen  11/14/2022    Depression Monitoring  05/26/2023    A1C test (Diabetic or Prediabetic)  08/25/2023    DTaP/Tdap/Td vaccine (3 - Td or Tdap) 08/25/2032    Pneumococcal 0-64 years Vaccine  Completed    Hepatitis C screen  Completed    HIV screen  Completed    Hepatitis A vaccine  Aged Out    Hib vaccine  Aged Out    Meningococcal (ACWY) vaccine  Aged Out       Hemoglobin A1C (%)   Date Value   08/25/2022 6.9   05/26/2022 7.9   09/14/2021 7.7             ( goal A1C is < 7)   Microalb/Crt.  Ratio (mcg/mg creat)   Date Value   06/11/2021 CANNOT BE CALCULATED     LDL Cholesterol (mg/dL)   Date Value   06/24/2021 52   04/20/2021 48       (goal LDL is <100)   AST (U/L)   Date Value   05/31/2022 27     ALT (U/L)   Date Value   05/31/2022 36 (H)     BUN (mg/dL)   Date Value   07/06/2022 15     BP Readings from Last 3 Encounters:   08/25/22 (!) 143/84   08/18/22 138/74   08/01/22 134/80          (goal 120/80)    All Future Testing planned in [facility]  Lab Frequency Next Occurrence   Saline lock IV Once 05/10/2022   Lumbar Epidural Steroid Injection/Caudal Once 11/10/2021   Lipid Panel Once 06/26/2022   Comprehensive Metabolic Panel Once 10/90/2162   Vitamin D 25 Hydroxy Once 05/26/2022   Sleep Study with PAP Titration Once 07/08/2022   Baseline Diagnostic Sleep Study Once 07/08/2022   Microalbumin, Ur Once 08/25/2022               Patient Active Problem List:     Cervical spondylosis     Type 2 diabetes mellitus without complication, without long-term current use of insulin (HCC)     Hypertension     Abnormal auditory perception     Eustachian tube dysfunction     Chronic serous otitis media     Nasal polyps     Nasal congestion     Osteoarthritis of left knee     Osteoarthritis of right knee     DIEGO (nonalcoholic steatohepatitis)     Vitamin D deficiency     Iron deficiency anemia     Dyslipidemia     Diabetes mellitus type 2, uncontrolled (HCC)     Left hip pain     Trochanteric bursitis     Fatigue     Daytime somnolence     Snoring     Chronic left shoulder pain     Restless legs syndrome     Generalized anxiety disorder     Primary osteoarthritis, left shoulder     Tendinopathy of left shoulder     Adhesive capsulitis of left shoulder     Lumbar radiculopathy     Back pain     Toxic metabolic encephalopathy     Class 2 obesity in adult     Ulnar neuropathy of both upper extremities     Leg edema     Recurrent major depressive disorder (HCC)     Dermatitis     Retrolisthesis of vertebrae     Bilateral carpal tunnel syndrome

## 2022-09-22 ENCOUNTER — HOSPITAL ENCOUNTER (OUTPATIENT)
Age: 62
Setting detail: SPECIMEN
Discharge: HOME | End: 2022-09-22

## 2022-09-22 ENCOUNTER — OFFICE VISIT (OUTPATIENT)
Dept: NEUROSURGERY | Age: 62
End: 2022-09-22
Payer: COMMERCIAL

## 2022-09-22 VITALS
OXYGEN SATURATION: 98 % | HEIGHT: 62 IN | WEIGHT: 188 LBS | SYSTOLIC BLOOD PRESSURE: 127 MMHG | HEART RATE: 70 BPM | DIASTOLIC BLOOD PRESSURE: 86 MMHG | BODY MASS INDEX: 34.6 KG/M2

## 2022-09-22 DIAGNOSIS — G56.22 ULNAR NEUROPATHY AT ELBOW, LEFT: ICD-10-CM

## 2022-09-22 DIAGNOSIS — G25.2 INTENTION TREMOR: Primary | ICD-10-CM

## 2022-09-22 DIAGNOSIS — E11.9 TYPE 2 DIABETES MELLITUS WITHOUT COMPLICATION, WITHOUT LONG-TERM CURRENT USE OF INSULIN (HCC): ICD-10-CM

## 2022-09-22 DIAGNOSIS — G56.21 ULNAR NEUROPATHY AT ELBOW, RIGHT: ICD-10-CM

## 2022-09-22 LAB
CREATININE URINE: 32.1 MG/DL (ref 28–217)
MICROALBUMIN/CREAT 24H UR: <12 MG/L
MICROALBUMIN/CREAT UR-RTO: NORMAL MCG/MG CREAT

## 2022-09-22 PROCEDURE — 99214 OFFICE O/P EST MOD 30 MIN: CPT | Performed by: NEUROLOGICAL SURGERY

## 2022-09-22 NOTE — PROGRESS NOTES
Department of Neurosurgery                                                      Follow up visit      History Obtained From:  patient, family member - daughter    CHIEF COMPLAINT:         Chief Complaint   Patient presents with    Follow-up       HISTORY OF PRESENT ILLNESS:       The patient is a 65 y.o. female who presents for follow up for ulnar neuropathy of both upper extremities. Patient reports one year of left lower back pain that radiates into left buttock and down left leg into calf. Patient reports back and buttock pain is worse than leg pain. Patient went to the ER due to back pain, her blood pressure dropped due to pain medication and she ended up in ICU for 10 days. Patient reports she was not intubated in the hospital. Patient reports she had received injections in her mid-back previously and did not experience any relief. Patient reports 6 months of left arm, left hand and finger, and right hand and finger numbness and weakness, worse in the left hand and arm. Patient reports she experienced hand pain shortly after she was discharged from hospital and has significantly worsened since. Patient has been using brace that Ricky Daniel gave her at night for 2-3 weeks, and has not noticed relief. Patient reports receiving PT on hands twice a week for 2 weeks, and she has been doing recommended exercises. Patient reports that since starting PT she has noticed some improvement in her hand mobility, and at the very least it has stopped her from getting worse. Patient reports since starting PT she is now able to close her hands, which she wasn't able to do before. Patient admits neck pain. Patient reports hx of diabetes. Patient was working [facility] [employer], until September.     PAST MEDICAL HISTORY :       Past Medical History:        Diagnosis Date    Cervical spondylosis 04/2009    c-4 c-5, c-5 c-6, c-6 c-7    Generalized anxiety disorder 12/08/2020    Hyperlipidemia     Hypertension Lumbar radiculopathy     DIEGO (nonalcoholic steatohepatitis) 10/12/2014    Obesity     Osteoarthritis of left knee 2014    Recurrent major depressive disorder ([facility] [address] 07/15/2022    Restless legs syndrome     TIA (transient ischemic attack)     no residual symptoms    Type II or unspecified type diabetes mellitus without mention of complication, not stated as uncontrolled        Past Surgical History:        Procedure Laterality Date    BACK INJECTION Left 10/03/2022    SI Joint injection     SECTION      COLONOSCOPY  2012    [facility]    HYSTERECTOMY, TOTAL ABDOMINAL (CERVIX REMOVED)      Polymenorrhagia    SHOULDER SURGERY Left 2021    SHOULDER MANIPULATION WITH PRE OP INTERSCALENE BLOCK and intraop injection performed by Denny Gonzalez DO at [address] Left 10/3/2022    LEFT SI JOINT INJECTION performed by Jen Moreno MD at [address]  2012    [facility]       Social History:   Social History     Socioeconomic History    Marital status:      Spouse name: Not on file    Number of children: Not on file    Years of education: Not on file    Highest education level: Not on file   Occupational History    Not on file   Tobacco Use    Smoking status: Never    Smokeless tobacco: Never   Vaping Use    Vaping Use: Never used   Substance and Sexual Activity    Alcohol use:  Yes     Alcohol/week: 0.0 standard drinks     Comment: rarely/ 4 times a year    Drug use: No    Sexual activity: Yes   Other Topics Concern    Not on file   Social History Narrative    Not on file     Social Determinants of Health     Financial Resource Strain: Low Risk     Difficulty of Paying Living Expenses: Not hard at all   Food Insecurity: No Food Insecurity    Worried About [address] in the Last Year: Never true    [address] in the Last Year: Never true   Transportation Needs: No Transportation Needs    Lack of Transportation (Medical): No    Lack of Transportation (Non-Medical): No   Physical Activity: Unknown    Days of Exercise per Week: 0 days    Minutes of Exercise per Session: Not on file   Stress: Stress Concern Present    Feeling of Stress : To some extent   Social Connections: Socially Integrated    Frequency of Communication with Friends and Family: More than three times a week    Frequency of Social Gatherings with Friends and Family: Once a week    Attends [employer]oration[employer] Services: More than 4 times per year    Active Member of [employer] or Organizations: Yes    Attends Club or Organization Meetings: More than 4 times per year    Marital Status:    Intimate Partner Violence: Not At Risk    Fear of Current or Ex-Partner: No    Emotionally Abused: No    Physically Abused: No    Sexually Abused: No   Housing Stability: Low Risk     Unable to Pay for Housing in the Last Year: No    Number of [place] in the Last Year: 1    Unstable Housing in the Last Year: No       Family History:       Problem Relation Age of Onset    Heart Attack Mother     Diabetes Mother     Diabetes Father     Heart Attack Father        Allergies:  Codeine    Home Medications:  Prior to Admission medications    Medication Sig Start Date End Date Taking?  Authorizing Provider   sertraline (ZOLOFT) 50 MG tablet TAKE ONE TABLET BY MOUTH EVERY MORNING 9/21/22  Yes Ashwini Booth MD   TRULICITY 1.5 ID/0.2AI SOPN INJECT THE CONTENTS OF ONE SYRINGE UNDER THE SKIN ONCE WEEKLY 9/21/22  Yes Ashwini Booth MD   celecoxib (CELEBREX) 100 MG capsule TAKE 1 CAPSULE BY MOUTH 2 TIMES A DAY 9/8/22  Yes Algernon Felty, MD   SYNJARDY 5-1000 MG TABS TAKE 1 TABLET BY MOUTH 2 TIMES A DAY 8/30/22  Yes Ashwini Booth MD   lisinopril (PRINIVIL;ZESTRIL) 40 MG tablet Take 1 tablet by mouth daily 8/25/22  Yes Rosa Perry MD   busPIRone (BUSPAR) 10 MG tablet TAKE 1 TABLET BY MOUTH 3 TIMES A DAY 8/23/22  Yes Sangeeta Gonzalez MD   Elastic Bandages & Supports (ELBOW BRACE) MISC 1 each by Does not apply route at bedtime Please dispense left cubital tunnel brace 8/18/22 11/16/22 Yes ORLY Kuhn - CNP   ASPIRIN LOW DOSE 81 MG EC tablet TAKE 1 TABLET BY MOUTH ONE TIME A DAY 8/10/22  Yes Duane Galicia MD   alendronate (FOSAMAX) 70 MG tablet Take 1 tablet by mouth every 7 days 7/29/22  Yes Abhay Piedra MD   oxyCODONE-acetaminophen (PERCOCET) 5-325 MG per tablet Take by mouth every 4 hours as needed for Pain. Yes Historical Provider, MD   atorvastatin (LIPITOR) 40 MG tablet Take 1 tablet by mouth at bedtime 7/15/22  Yes Anastacia Tolbert MD   rOPINIRole (REQUIP) 1 MG tablet Take 2 tablets by mouth nightly 7/15/22  Yes Anastacia Tolbert MD   acetaminophen (TYLENOL 8 HOUR) 650 MG extended release tablet Take 1 tablet by mouth every 8 hours as needed for Pain 7/15/22  Yes Anastacia Tolbert MD   miconazole (ZEASORB-AF) 2 % powder Apply 43 g topically as needed for Itching Apply topically 2 times daily. 7/15/22  Yes Anastacia Tolbert MD   furosemide (LASIX) 20 MG tablet Take 0.5 tablets by mouth every other day 7/15/22  Yes Anastacia Tolbert MD   diclofenac sodium (VOLTAREN) 1 % GEL Apply 2 g topically in the morning and 2 g at noon and 2 g in the evening and 2 g before bedtime. 7/15/22  Yes Anastacia Tolbert MD   blood glucose test strips (PRODIGY NO CODING BLOOD GLUC) strip Use to test once daily and as needed as directed by provider. Please dispense Prodigy brand.  7/12/22  Yes Duane Galicia MD   Magnesium Oxide 400 MG CAPS Take 400 mg by mouth daily 7/6/22  Yes Omkar Montaño MD   glipiZIDE (GLUCOTROL) 10 MG tablet Take 1 tab bid 1/13/22  Yes Duane Galicia MD   omeprazole (PRILOSEC) 20 MG delayed release capsule Take 1 capsule by mouth 2 times daily 7/26/21  Yes Duane Galicia MD   bimatoprost (LUMIGAN) 0.01 % SOLN ophthalmic drops Place 1 drop into both eyes nightly 4/26/18  Yes Historical Provider, MD   PRODIGY LANCETS 28G MISC 1 Bottle by Does not apply route three times daily 4/26/19  Yes Yaa Robles MD   blood glucose monitor kit and supplies Dispense sufficient amount for indicated testing frequency plus additional to accommodate PRN testing needs. Dispense all needed supplies to include: monitor, strips, lancing device, lancets, control solutions, alcohol swabs. Prodigy Brand 7/6/22   Yaa Robles MD   gabapentin (NEURONTIN) 300 MG capsule Take 1 capsule by mouth 3 times daily for 30 days. 6/9/22 7/9/22  Glenroy Miner MD   albuterol sulfate HFA (PROVENTIL HFA) 108 (90 Base) MCG/ACT inhaler Inhale 2 puffs into the lungs every 4 hours as needed for Wheezing 4/20/21 4/20/22  Yaa Robles MD   repaglinide (PRANDIN) 1 MG tablet TAKE 1 TABLET BY MOUTH 3 TIMES A DAY BEFORE MEALS 12/8/20   Yaa Robles MD   Blood Glucose Monitoring Suppl (PRODIGY AUTOCODE BLOOD GLUCOSE) w/Device KIT 1 Device by Does not apply route 3 times daily 4/26/19   Yaa Robles MD       Current Medications:   No current facility-administered medications for this visit.   Facility-Administered Medications Ordered in Other Visits: glucose chewable tablet 16 g, 4 tablet, Oral, PRN  dextrose bolus 10% 125 mL, 125 mL, IntraVENous, PRN **OR** dextrose bolus 10% 250 mL, 250 mL, IntraVENous, PRN  glucagon (rDNA) injection 1 mg, 1 mg, SubCUTAneous, PRN  dextrose 10 % infusion, , IntraVENous, Continuous PRN  cefTRIAXone (ROCEPHIN) 1,000 mg in sterile water 10 mL IV syringe, 1,000 mg, IntraVENous, Once  cyclobenzaprine (FLEXERIL) tablet 10 mg, 10 mg, Oral, TID PRN  Empagliflozin-metFORMIN HCl 5-1000 MG TABS 1 tablet (Patient Supplied), 1 tablet, Oral, BID  oxyCODONE (ROXICODONE) immediate release tablet 10 mg, 10 mg, Oral, Q4H PRN  lidocaine 4 % external patch 1 patch, 1 patch, TransDERmal, Daily  atorvastatin (LIPITOR) tablet 40 mg, 40 mg, Oral, Nightly  busPIRone (BUSPAR) tablet 10 mg, 10 mg, Oral, TID  celecoxib (CELEBREX) capsule 100 mg, 100 mg, Oral, BID  furosemide (LASIX) tablet 10 mg, 10 mg, Oral, Every Other Day  gabapentin (NEURONTIN) capsule 300 mg, 300 mg, Oral, TID  glipiZIDE (GLUCOTROL) tablet 10 mg, 10 mg, Oral, BID AC  lisinopril (PRINIVIL;ZESTRIL) tablet 40 mg, 40 mg, Oral, Daily  magnesium oxide (MAG-OX) tablet 400 mg, 400 mg, Oral, Daily  pantoprazole (PROTONIX) tablet 40 mg, 40 mg, Oral, BID AC  rOPINIRole (REQUIP) tablet 2 mg, 2 mg, Oral, Nightly  sertraline (ZOLOFT) tablet 50 mg, 50 mg, Oral, Daily  sodium chloride flush 0.9 % injection 5-40 mL, 5-40 mL, IntraVENous, 2 times per day  sodium chloride flush 0.9 % injection 5-40 mL, 5-40 mL, IntraVENous, PRN  0.9 % sodium chloride infusion, , IntraVENous, PRN  ondansetron (ZOFRAN-ODT) disintegrating tablet 4 mg, 4 mg, Oral, Q8H PRN **OR** ondansetron (ZOFRAN) injection 4 mg, 4 mg, IntraVENous, Q6H PRN  polyethylene glycol (GLYCOLAX) packet 17 g, 17 g, Oral, Daily PRN  acetaminophen (TYLENOL) tablet 1,000 mg, 1,000 mg, Oral, 3 times per day      PHYSICAL EXAM:       /86   Pulse 70   Ht 5' 2\" (1.575 m)   Wt 188 lb (85.3 kg)   SpO2 98%   BMI 34.39 kg/m²   Physical Exam     Gen: NAD  HEENT: moist mucus membranes  Cardio: RRR  Pulm: chest rise symmetrically  GI: abd soft  Ext: no edema  Skin: warm    Neuro:    AOX3  CN 2-12 grossly intact  Speech articulate  Motor 5/5  No pronator drift  Sensation symmetrical   Positive left Tiffany's sign  Subtle left conte's sign  Right Tromner's sign  Possible tinel's sign bilaterally cubital tunnel  Tenderness in the bilateral cubital tunnel  Left tinel's sign on the carpal tunnel  Left  4/5   Intercostal 4-/5  Bicep-tricep giveaway weakness 4+/5  Bilateral intension tremors    Radiology Review:    None new.       ASSESSMENT AND PLAN:       Patient Active Problem List   Diagnosis    Cervical spondylosis    Type 2 diabetes mellitus without complication, without long-term current use of insulin (HCC)    Hypertension    Abnormal auditory perception    Nasal polyps    Osteoarthritis of left knee    Osteoarthritis of right knee    DIEGO (nonalcoholic steatohepatitis)    Vitamin D deficiency    Iron deficiency anemia    Dyslipidemia    Trochanteric bursitis    Chronic left shoulder pain    Restless legs syndrome    Generalized anxiety disorder    Toxic metabolic encephalopathy    Class 2 obesity in adult    Leg edema    Recurrent major depressive disorder (HCC)    Dermatitis    Retrolisthesis of vertebrae    Bilateral carpal tunnel syndrome    Intention tremor    Sciatica    Back pain with radiation         A/P:  This is a 65 y.o. female with Intention tremor  -     [facility], [place], operated by [facility], Neurology, [facility]  Ulnar neuropathy at elbow, left  -     [facility], [place], operated [facility], Neurology, [facility]  Ulnar neuropathy at elbow, right  -     [facility], [place], operated by [facility], Neurology, [employer]     I thoroughly discussed details of diagnosis, natural histories of disease, and treatment options. We discussed surgical and non-surgical options, namely cubital tunnel release. I detailed the benefits, risks, recovery period, and alternatives of this surgery. I used the imaging to depict the surgery and diagnosis to the patient. She does have significant motor deficit on the left hand with stagnant progress with conservative management, I do recommend surgical decompression    Patient is agreeable to proceed with surgery at this time. I ordered a referral to neurology. Follow up in 2 months. Patient and/or family was counseled on the diagnosis and treatment plan    By signing my name below, I, Joie Donaldson, attest that this documentation has been prepared under the direction and in the presence of Chava Last DO. Electronically signed: Oseas Maldonado, 9/22/22       This note was created using voice recognition software. There may be inaccuracies of transcription  that are inadvertently overlooked prior to the signature.   There is any questions about the transcription please contact me.

## 2022-09-23 ENCOUNTER — HOSPITAL ENCOUNTER (OUTPATIENT)
Dept: PHYSICAL THERAPY | Age: 62
Setting detail: THERAPIES SERIES
Discharge: HOME | End: 2022-09-23
Payer: COMMERCIAL

## 2022-09-23 ENCOUNTER — APPOINTMENT (OUTPATIENT)
Dept: OCCUPATIONAL THERAPY | Age: 62
End: 2022-09-23
Payer: COMMERCIAL

## 2022-09-23 ENCOUNTER — HOSPITAL ENCOUNTER (OUTPATIENT)
Dept: OCCUPATIONAL THERAPY | Age: 62
Setting detail: THERAPIES SERIES
Discharge: HOME | End: 2022-09-23
Payer: COMMERCIAL

## 2022-09-23 PROCEDURE — 97110 THERAPEUTIC EXERCISES: CPT

## 2022-09-23 PROCEDURE — 97113 AQUATIC THERAPY/EXERCISES: CPT

## 2022-09-23 NOTE — FLOWSHEET NOTE
[facility]   [address] [place] [address] [address]   Phone: [phone]   Fax: [phone]    Physical Therapy Daily Treatment Note      Date:  2022  Patient Name:  Heydi Ramirez    :  1960  MRN: [id number]  Physician: Bridget De La Garza MD (resident physician)  Gerson Garland MD (attending physician)                                  Insurance: exsulin. Auth required after 30 visits  Medical Diagnosis: M54.16 (ICD-10-CM) - Lumbar radiculopathy              Rehab Codes: M54.16  Onset Date: 2021 with recent exacerbation 2022                    Next 's appt: PCP 10/2022; Neurosurgeon 22; neurology 2022  Visit# / total visits:  Cancels/No Shows: 2/0    Subjective: Patient reporting to aquatic therapy post OT stating she has less pain coming into therapy session. Pain:  [x] Yes  [] No Location:(L) Lower back into buttock, (L) posterior/lateral thigh to knee; numbness in lower leg to ankle; R buttock and hip- denies symptoms to knees Pain Rating: (0-10 scale) 4/10  Pain altered Tx:  [x] No  [] Yes  Action:    Comments:      Objective:  Modalities:   Precautions:  Exercises:    [facility] Exercise Log  Aquatic, Hip & DLS Program- Phase 1    Date of Eval:                                Primary PT: [place] , PT  Diagnosis:   Things to Focus On (goals):   Surgical Precautions:  Medical Precautions:  [] C-9 dates  [] Occ Med   [] Medicare       Date 22   Visit #    Walk F/L/R 1 Lap 2 Laps 2 Laps 2Laps   Marching 1 Lap 2 Laps 2 Laps 2Laps    Low Box- Try No Hands Low box- Try No hands Low Box  Try No Hands Low Box  Try No Hands   Squats 15x5\" 15x5\" 15x5\" 15x5\"   Step-Ups F/L Low 15x Low 15x Tall 10x Tall 10x   Step Down F/L       Heel/Toe Raise Low Box 11x Low 15x Low 15x Low 15x   SLR F/L/R 15x 15x 15x DC   Steam Boats    10x   Figure 8s    8x Hip/Knee Flex/Ext 15x 15x 15x 15x   F/L Lunges Low 12x Low 12x Tall 5x Tall 5x          Kickboard Ex. Med Med Med Med   Iso Abd.  10x5\" 10x5\" 10x5\" 10x 5\"   Push-pull 15x 15x 15x 15x   Paddling 15x 15x 15x 15x          UE Format: 2 Small Balls Paddles Level 1 Paddles  Level 1 Paddles  Level 1   Horiz Abd/Add 1' (18 ) 1'(13) 1' (13) 1' (19)   IR/ER (wipers) 1' 1' 1' 1'   Alt Flex/Ext 1' (21) 1'(15) 1' (17 ) 1' (20)   Alt Press Down 1' 1' 1' 1'   Abd/Add 1' (29)  1'(21) 1' ( 25) 1' (23)          Deep Water: 1 Noodle 1 Noodle 1 noodle 1 Noodle   Hang 2' 5' 5' 5'   Cycling 2' 2' 2' 2'   Jacks 1' 2' 2' 2'   X-Country No time 2' 2' 2'          Balance       SLS              Stretches       Achllies       Hamstring No Time 2x20\" 2x20\" No time   Piriformis              Breast Stroke No time 2 Laps 2 Laps No time   Pain Rating 6 6 6 4     Specific Instructions for next treatment:     Assessment: [x] Progressing toward goals. Continued with exercises as charted above adding steamboats and figure 8s. Patient reporting inc difficulty with hip extesnion with steam boats and was not able to perform them as quickly as hip ABD and flexion. Patient reported feeling like her LLE felt more weak than usual this session. Patient reporting her pain was unchanged. [] No change. [x] Other: Skilled PT intervention is required to help centralize/modulate sx, improve pain free lumbar AROM and maximize neuromuscular strength & stability necessary for unrestricted ADL/ work tolerances at her prior level of function.      STG: (to be met in 18 treatments)  Pt will self report worst pain no greater than 3/10 in order to better tolerate ADLs/work activities with minimal dysfunction GOAL PROGRESSING AND EXTENDED 9/6  Pt will improve lumbar AROM to 90% or greater in all planes in order to demonstrate ability to move/reach in all planes unrestricted at [facility] 91 9/6  Pt will improve trunk strength to 4/5 or greater in all planes to show improved stability at prior level GOAL PROGRESSING AND EXTENDED 9/6  Pt will self report reduced peripheral L LE pain (no distal than L knee) to demonstrate initial  favorable centralization response to therapy GOAL MET 9/6  LTG: (to be met in 26 treatments)  Pt will demonstrate improved B LE strength to 4/5 or greater in order to demonstrate improved stability/strength necessary for unrestricted ADLs/work activities GOAL PROGRESSING AND EXTENDED 9/6  Pt will self report ability to complete all work duties with pain no greater than 1/10 to demonstrate unrestricted functional tolerances at prior level GOAL PROGRESSING AND EXTENDED 9/6  Pt will improve core strength Sahrmann Grade 2/5 or greater to demonstrate better support and stability to lumbar spine GOAL PROGRESSING AND EXTENDED 9/6  Pt will decrease SHEY to 10% impaired or less in order to demonstrate improved functional tolerances at PLOF with minimal restriction/dysfunction GOAL PROGRESSING AND EXTENDED 9/6  Pt will demonstrate independence with a long term HEP for continued progress/maintenance after completion of PT GOAL PROGRESSING AND EXTENDED 9/6      Patient Goals/Rehab Expectations: To alleviate pain and get back to normal    Pt. Education:  [] Yes  [] No  [x] Reviewed Prior HEP/Ed  Method of Education: [x] Verbal  [x] Demo  [] Written  Comprehension of Education:   [x] Verbalizes understanding. [x] Demonstrates understanding. [] Needs review. [] Demonstrates/verbalizes HEP/Ed previously given. Plan: [x] Continue per plan of care.    [] Other:       Treatment Charges: Mins Units   []  Modalities     []  Ther Exercise     []  Manual Therapy     []  Ther Activities     [x]  Aquatics 57 4   []  Neuromuscular     [] Vasocompression     [] Gait Training     [] Dry needling        [] 1 or 2 muscles        [] 3 or more muscles     []  Other     Total Treatment time 57 4     Time In: 8492         Time Out: 6600      Electronically signed by:  Yunior Paz, PTA

## 2022-09-23 NOTE — FLOWSHEET NOTE
[] [facility]       Occupational Therapy            1st floor       [address] [place], [place]         Phone: [phone]       Fax: [phone] [] [facility]  [address]  [place], [place]  Phone: [phone]  Fax: [phone]  [x] [facility] ([facility]) @ [facility]  [address] [facility] [address]  [address]  Phone [phone]  Fax [phone]      Occupational Therapy Daily Treatment Note    Date:  2022  Patient Name:  Rosalba Carter    :  1960  MRN: [id number]  Referring Provider:  Zoraida Middleton MD  Insurance: Other [facility]  Medical Diagnosis: Retrolisthesis of vertebrae (M43.10) Bilateral Carpal Tunnel (G56.03)     Rehab Codes: pain in right upper arm M79.621,, pain in left upper arm M79.622, , lack of coordination unspecified R27.0,, or parasthesia of skin R20.2,  Onset Date: 22                 Next Dr. Dejon Jeterar: 22  Visit# / total visits: ; Progress note for Medicare patient due at visit 8-9    Cancels/No Shows: 0/0      Subjective:    Pain:  [] Yes  [x] No Location: N/A Pain Rating: (0-10 scale) 4/10  Pain altered Tx:  [x] No  [] Yes  Action:  Pt Comments: Pt reports MD wishes to complete a cubital tunnel release (pt to be called to schedule appointment) MD stated they would start w/ L UE. Pt reports continued numbness and tingling in B UE.     Objective:  Modalities:  Precautions: N/A  Exercises:  Exrcise Reps/Time Weight/Level Comments   Tendon glide 10   Completed   Ulnar nerve glide 2x10   Shoulder 90 w/ elbow flex/ext   Completed   gripping 2 minutes yellow Theraputty    Thumb opposition 10   Completed   Scapula retraction   3x10 Green   Completed    Shoulder extention 3x10   Green  Completed   Tricep extension 3x10 green    Clip placing  Yellow/red/green/Blue/Black Completed   [address] 4 rows   Not completed   Placing pegs in board   Rows 3x10 green completed   PNF 3x10 Green rt UE going from rt shoulder to lt hip 10x , PNF diagonal lt UE going from lt shoudler to rt hip   Flexbar    Wrist Flexion/Ext  Wrist pronation  Wrist supination 3x10 red completed   Serratus anterior wall slide w/ lift off 2x10 AROM Completed    Other:      Treatment Charges: Mins Units   []  Modalities:      []  Ultrasound     [x]  Ther Exercise 40 3   []  Manual Therapy     []  Ther Activities     []  Orthotic fit/train     []  Orthotic recheck     []  Other     Total Treatment time 40 3       Assessment: [x] Progressing toward goals. Discussed information pt receieved from MD concerning cubital tunnel release. Pt waiting to be scheduled for L cubital tunnel release. Cont w/ massage  throughout ulnar nerve pathway. Cont w/ posture exercises of shoulder extension and scapular retractions. Cont flexbar exercises. cont ulnar nerve glides (prayer stretch). Pt completed them w/out issue. cont serratus anterior wall slides w/ lift off. Pt tolerated tx well today w/out complaint. [] No change. [] Other     [x] Patient would continue to benefit from skilled occupational therapy services in order to: Improve, Increase, and Restore  functional /grasp, Motor control/Tone in UE, Strength, Activity tolerance, and Coordination deficit in order to improve functional use of UE in ADL performance and return to PLOF     STG/LTG  Short Term Goals: (  8    Treatments)  Decrease numbness/tingling per subjective report   Increase strength (pounds);  strength by 5 lbs  Increase pinch strength by 3lbs  Pt will increase 9-hole pef test by 10 seconds on each UE  Increase function:UE Functional Index Score 8 or more points to promote increased functional abilities  Demo knowledge of fall prevention in 2 sessions. Patient to be independent with home exercise program as demonstrated by performance with correct form without cues.      Long Term Goals: (  16  Treatments)  Pt will increase 9 hole peg test time by 15 seconds  Pt will improve UEFI score by 15 points  Pt will increase  strength by 10 lbs  Pt will increase pinch strength by 5 lbs    Pt. Education:  [x] Yes  [] No  [] Reviewed Prior HEP/Ed  Method of Education: [x] Verbal  [x] Demo  [] Written  Re: issued yellow theraputty. Comprehension of Education:  [x] Verbalizes understanding. [x] Demonstrates understanding. [] Needs review. [] Demonstrates/verbalizes HEP/Ed previously given. Plan: [x] Continue current frequency toward short and long term goals. [x] Specific Instructions for subsequent treatments: Cont w/ nerve glides and strengthening.    [] Other:       Time In: 0930  Time Out: 1010      Electronically signed by:  YAAKOV Ann/CARMEN

## 2022-09-27 ENCOUNTER — HOSPITAL ENCOUNTER (OUTPATIENT)
Dept: PHYSICAL THERAPY | Age: 62
Setting detail: THERAPIES SERIES
Discharge: HOME | End: 2022-09-27
Payer: COMMERCIAL

## 2022-09-27 ENCOUNTER — HOSPITAL ENCOUNTER (OUTPATIENT)
Dept: OCCUPATIONAL THERAPY | Age: 62
Setting detail: THERAPIES SERIES
Discharge: HOME | End: 2022-09-27
Payer: COMMERCIAL

## 2022-09-27 PROCEDURE — 97110 THERAPEUTIC EXERCISES: CPT

## 2022-09-27 PROCEDURE — 97113 AQUATIC THERAPY/EXERCISES: CPT

## 2022-09-27 NOTE — FLOWSHEET NOTE
[facility]   [address] [facility] [address]   Phone: [phone]   Fax: [phone]    Physical Therapy Daily Treatment Note      Date:  2022  Patient Name:  Margarita Branham    :  1960  MRN: [id number]  Physician: Indigo Valdes MD (resident physician)  Aaron Stack MD (attending physician)                                  Insurance: [employer]. Auth required after 30 visits  Medical Diagnosis: M54.16 (ICD-10-CM) - Lumbar radiculopathy              Rehab Codes: M54.16  Onset Date: 2021 with recent exacerbation 2022                    Next 's appt: PCP 10/2022; Neurosurgeon 22; neurology 2022  Visit# / total visits:  Cancels/No Shows: 2/0    Subjective: slightly increased back and left hip/buttock pain this date; spent a lot of time on her feet over the weekend. No symptoms in L Lower leg and no symptoms in R buttock or LE this date. Had mild symptoms over the weekend from working the [employer] for 4 hours. Having surgery on her L arm for ulnar nerve/carpal tunnel-  with Dr Fredi Cast but she is hoping it is sooner as her health insurance terms in December     Pain:  [x] Yes  [] No Location:(L) Lower back into buttock, (L) posterior/lateral thigh; Pain Rating: (0-10 scale) 5/10  Pain altered Tx:  [x] No  [] Yes  Action:    Comments:      Objective:  Modalities:   Precautions:  Exercises:    [facility] J Exercise Log  Aquatic, Hip & DLS Program- Phase 1    Date of Eval:                                Primary PT: Scar Rod PT  Diagnosis:   Things to Focus On (goals):   Surgical Precautions:  Medical Precautions:  [] C-9 dates  [] Occ Med   [] Medicare       Date 22   Visit #    Walk F/L/R 1 Lap 2 Laps 2 Laps 2Laps 2 Laps   Marching 1 Lap 2 Laps 2 Laps 2Laps 2 Laps    Low Box- Try No Hands Low box- Try No hands Low Box  Try No Hands Low Box  Try No Hands Tall box/Light Touch   Squats 15x5\" 15x5\" 15x5\" 15x5\" 10x5\"   Step-Ups F/L Low 15x Low 15x Tall 10x Tall 10x Tall 10x   Step Down F/L        Heel/Toe Raise Low Box 11x Low 15x Low 15x Low 15x -   SLR F/L/R 15x 15x 15x DC -   Steam Boats    10x 10x   Figure 8s    8x 10x   Hip/Knee Flex/Ext 15x 15x 15x 15x 10x   F/L Lunges Low 12x Low 12x Tall 5x Tall 5x Tall 10x           Kickboard Ex. Med Med Med Med Med   Iso Abd.  10x5\" 10x5\" 10x5\" 10x 5\" -   Push-pull 15x 15x 15x 15x 15x   Paddling 15x 15x 15x 15x 15x           UE Format: 2 Small Balls Paddles Level 1 Paddles  Level 1 Paddles  Level 1 Paddles Level 1     Horiz Abd/Add 1' (18 ) 1'(13) 1' (13) 1' (19) 1' ( 18 )   IR/ER (wipers) 1' 1' 1' 1' 1'   Alt Flex/Ext 1' (21) 1'(15) 1' (17 ) 1' (20) 1' ( 21 )   Alt Press Down 1' 1' 1' 1' 1'   Abd/Add 1' (29)  1'(21) 1' ( 22) 1' (23) 1' (25  )           Deep Water: 1 Noodle 1 Noodle 1 noodle 1 Noodle 1 Noodle   Hang 2' 5' 5' 5' 5'   Cycling 2' 2' 2' 2' 2'   Jacks 1' 2' 2' 2' 2'   X-Country No time 2' 2' 2' 2'           Balance        SLS                Stretches        Achllies        Hamstring No Time 2x20\" 2x20\" No time 2x20\"   Piriformis                Breast Stroke No time 2 Laps 2 Laps No time 2 Laps   Pain Rating 6 6 6 4 5     Specific Instructions for next treatment: PT re-assess next visit due to insurance limitations    Assessment: [x] Progressing toward goals. Increased WB with LE exercise to 50% WB- patient notes continued difficulty with WB on L LE- feels much more unstable and weaker. Good effort noted to perform exercise without UE support- requires light touch with WB on L LE. Increased speed with UE format noted- improved core stability. [] No change.       [x] Other: Skilled PT intervention is required to help centralize/modulate sx, improve pain free lumbar AROM and maximize neuromuscular strength & stability necessary for unrestricted ADL/ work tolerances at her prior level of function. STG: (to be met in 18 treatments)  Pt will self report worst pain no greater than 3/10 in order to better tolerate ADLs/work activities with minimal dysfunction GOAL PROGRESSING AND EXTENDED 9/6  Pt will improve lumbar AROM to 90% or greater in all planes in order to demonstrate ability to move/reach in all planes unrestricted at [facility] 9/6  Pt will improve trunk strength to 4/5 or greater in all planes to show improved stability at prior level GOAL PROGRESSING AND EXTENDED 9/6  Pt will self report reduced peripheral L LE pain (no distal than L knee) to demonstrate initial  favorable centralization response to therapy GOAL MET 9/6  LTG: (to be met in 26 treatments)  Pt will demonstrate improved B LE strength to 4/5 or greater in order to demonstrate improved stability/strength necessary for unrestricted ADLs/work activities GOAL PROGRESSING AND EXTENDED 9/6  Pt will self report ability to complete all work duties with pain no greater than 1/10 to demonstrate unrestricted functional tolerances at prior level GOAL PROGRESSING AND EXTENDED 9/6  Pt will improve core strength Sahrmann Grade 2/5 or greater to demonstrate better support and stability to lumbar spine GOAL PROGRESSING AND EXTENDED 9/6  Pt will decrease SHEY to 10% impaired or less in order to demonstrate improved functional tolerances at PLOF with minimal restriction/dysfunction GOAL PROGRESSING AND EXTENDED 9/6  Pt will demonstrate independence with a long term HEP for continued progress/maintenance after completion of PT GOAL PROGRESSING AND EXTENDED 9/6      Patient Goals/Rehab Expectations: To alleviate pain and get back to normal    Pt. Education:  [] Yes  [] No  [x] Reviewed Prior HEP/Ed  Method of Education: [x] Verbal  [x] Demo  [] Written  Comprehension of Education:   [x] Verbalizes understanding. [x] Demonstrates understanding. [] Needs review.   [] Demonstrates/verbalizes HEP/Ed previously given.     Plan: [] Continue per plan of care.    [x] Other: Re-assess next visit      Treatment Charges: Mins Units   []  Modalities     []  Ther Exercise     []  Manual Therapy     []  Ther Activities     [x]  Aquatics 54 4   []  Neuromuscular     [] Vasocompression     [] Gait Training     [] Dry needling        [] 1 or 2 muscles        [] 3 or more muscles     []  Other     Total Treatment time 54 4     Time In: 619 PM        Time Out: 424 PM      Electronically signed by:  Micky Alvarez PTA

## 2022-09-27 NOTE — FLOWSHEET NOTE
[] [facility]       Occupational Therapy            1st floor       [address] [place], [place]         Phone: [phone]       Fax: [phone] [] [facility]  [address]  [place], [place]  Phone: [phone]  Fax: [phone]  [x] [facility] ([facility]) @ [facility]  [address] [facility] [address]  [address]  Phone [phone]  Fax [phone]      Occupational Therapy Daily Treatment Note    Date:  2022  Patient Name:  Rosalba Carter    :  1960  MRN: [id number]  Referring Provider:  Zoraida Middleton MD  Insurance: Other [facility]  Medical Diagnosis: Retrolisthesis of vertebrae (M43.10) Bilateral Carpal Tunnel (G56.03)     Rehab Codes: pain in right upper arm M79.621,, pain in left upper arm M79.622, , lack of coordination unspecified R27.0,, or parasthesia of skin R20.2,  Onset Date: 22                 Next  [address]: 22  Visit# / total visits: ; Progress note for Medicare patient due at visit 8-9    Cancels/No Shows: 0/0      Subjective:    Pain:  [] Yes  [x] No Location: N/A Pain Rating: (0-10 scale) 4/10  Pain altered Tx:  [x] No  [] Yes  Action:  Pt Comments: Pt reports she is scheduled for sx on 22. Pt reports she is doing \"okay\" and is hoping she can get into sx sooner.      Objective:  Modalities:  Precautions: N/A  Exercises:  Exrcise Reps/Time Weight/Level Comments   Tendon glide 10   Completed   Ulnar nerve glide 2x10   Shoulder 90 w/ elbow flex/ext   Completed   gripping 2 minutes yellow Theraputty    Thumb opposition 10   Completed   Scapula retraction   3x10 Green   Completed    Shoulder extention 3x10   Green  Completed   Tricep extension 3x10 green    Clip placing  Yellow/red/green/Blue/Black Completed   [address] 4 rows   Not completed   Placing pegs in board   Rows 3x10 green completed   PNF 3x10 Green rt UE going from rt shoulder to lt hip 10x , PNF diagonal lt UE going from lt shoudler to rt hip   Flexbar    Wrist Flexion/Ext  Wrist pronation  Wrist supination 3x10 red completed   Serratus anterior wall slide w/ lift off 2x10 AROM Completed    Other:      Treatment Charges: Mins Units   []  Modalities:      []  Ultrasound     [x]  Ther Exercise 40 3   []  Manual Therapy     []  Ther Activities     []  Orthotic fit/train     []  Orthotic recheck     []  Other     Total Treatment time 40 3       Assessment: [x] Progressing toward goals. Pt is doing well on this date and tolerated tx well. Pt is scheduled for cubital tunnel release on 11/21/22. Cont w/ massage  throughout ulnar nerve pathway. Cont w/ posture exercises of shoulder extension and scapular retractions. Cont flexbar exercises. cont ulnar nerve glides (prayer stretch). [] No change. [] Other     [x] Patient would continue to benefit from skilled occupational therapy services in order to: Improve, Increase, and Restore  functional /grasp, Motor control/Tone in UE, Strength, Activity tolerance, and Coordination deficit in order to improve functional use of UE in ADL performance and return to PLOF     STG/LTG  Short Term Goals: (  8    Treatments)  Decrease numbness/tingling per subjective report   Increase strength (pounds);  strength by 5 lbs  Increase pinch strength by 3lbs  Pt will increase 9-hole pef test by 10 seconds on each UE  Increase function:UE Functional Index Score 8 or more points to promote increased functional abilities  Demo knowledge of fall prevention in 2 sessions. Patient to be independent with home exercise program as demonstrated by performance with correct form without cues. Long Term Goals: (  16  Treatments)  Pt will increase 9 hole peg test time by 15 seconds  Pt will improve UEFI score by 15 points  Pt will increase  strength by 10 lbs  Pt will increase pinch strength by 5 lbs    Pt.  Education:  [x] Yes  [] No  [] Reviewed Prior HEP/Ed  Method of Education: [x] Verbal  [x] Oneil  [] Written  Re: discussed sx date. Comprehension of Education:  [x] Verbalizes understanding. [x] Demonstrates understanding. [] Needs review. [] Demonstrates/verbalizes HEP/Ed previously given. Plan: [x] Continue current frequency toward short and long term goals. [x] Specific Instructions for subsequent treatments: Cont w/ nerve glides and strengthening.    [] Other:       Time In: 1430  Time Out: 6590      Electronically signed by:  YAAKOV Duggan/CARMEN

## 2022-09-29 ENCOUNTER — CARE COORDINATION (OUTPATIENT)
Dept: OTHER | Facility: CLINIC | Age: 62
End: 2022-09-29

## 2022-09-29 ENCOUNTER — TELEPHONE (OUTPATIENT)
Dept: FAMILY MEDICINE CLINIC | Age: 62
End: 2022-09-29

## 2022-09-29 RX ORDER — SODIUM CHLORIDE, SODIUM LACTATE, POTASSIUM CHLORIDE, CALCIUM CHLORIDE 600; 310; 30; 20 MG/100ML; MG/100ML; MG/100ML; MG/100ML
1000 INJECTION, SOLUTION INTRAVENOUS CONTINUOUS
Status: CANCELLED | OUTPATIENT
Start: 2022-09-29

## 2022-09-29 NOTE — DISCHARGE INSTRUCTIONS
Pre-operative Instructions    Please arrive at the surgery center by 9:20 AM on 10/17/2022  (or as directed by your surgeon's office). See Directons to Surgery Center below. FASTING    NOTHING TO EAT OR DRINK AFTER MIDNIGHT the night prior to surgery (This includes gum, candy, mints, chewing tobacco, etc). MEDICATIONS    What to STOP: ANY BLOOD THINNING MEDICATION(S) as directed by your surgeon or prescribing physician. FAILURE TO STOP CERTAIN MEDICATIONS MAY INTERFERE WITH YOUR SCHEDULED SURGERY. According to the medication list you provided today, PLEASE STOP: aspirin, diclofenac sodium (Voltaren)    2. What to CONTINUE leading up to your surgery:   Please take all your other daily medications except the medications listed above that you were instructed to hold. 3. What to [place] with SMALL SIP OF WATER: lisinopril (Prinvil), gabapentin (Neurontin), omeprazole (Prilosec), and if needed: percocet                        IF APPLICABLE:  -If you have been given a blood band, you must bring it with you the day of surgery, unclasped.  -Use routine inhalers and bring inhalers the day of surgery.   -Bring C-Pap/Bi-pap with you morning of surgery if planning on staying in the hospital overnight.  -Do not take diabetic medications on the day of surgery. OTHER IMPORTANT REMINDERS    1) Please REMEMBER to get Covid-19 Screening if scheduled    2) You may be required to provide a urine sample upon your arrival to the pre-op area, so please take this into consideration. 3) If  NOT planning on staying in the hospital overnight : A. You will need an adult family member /friend to drive you home after your procedure.  Taxi cabs or any form of public transportation ALONE is not acceptable.   -Your  must be 25years of age or older and able to sign off on your discharge instructions.     -It is preferable that the friend or family member stay at the hospital throughout your procedure. Lorna Mccabe must remain with you once you have arrived home for the first 24 hours after your surgery if you receive anesthesia or medication. If you do not have someone to stay with you, your procedure may be cancelled. 4) Do not wear any jewelry or body piercings day of surgery. 5) In case of illness - If you have cold or flu like symptoms (high fever, runny nose, sore throat, cough, etc.) rash, nausea, vomiting, loose stools, and/or recent contact with someone who has a contagious disease (Covid-19, chicken pox, measles, etc.) PLEASE notify your surgeon as soon as possible. 9/29/22  11:59 AM      ___________________  _______________________  Signature (Provider)              Signature (Patient)     Day of Surgery/Procedure    As a patient at [address] [place] [address] you can expect quality medical and nursing care that is centered on your individual needs. Our goal is to make your surgical experience as comfortable as possible  . Directions to the [address] [place] [address] is located at [address] [facility], [address] [facility]. Please pull into the Emergency/Surgery Center parking lot or there is additional parking across the street. You will enter the facility under through the glass doors and proceed to registration check-in which is right inside the door. Thereafter you will be directed to the [address] [place] [address]. Patient Instructions    ·Please shower the night before and the morning of surgery with an antibacterial soap. Please use the cleaning solution (bottle) given to you the night before your surgery after your shower. Unless otherwise told by your physician, please do not shave legs or any part of your body below your neck the night before or day of your surgery. You may shave your face or neck. ·Please wear loose, comfortable clothing.   If you are potentially going to have a cast or brace bring clothing that will fit over them. ·Bring a list of all medications you take, along with the dose of the medications and how often you take it. If more convenient bring the pharmacy bottles in a zip lock bag. ·Brush your teeth but do not swallow water. ·Bring your eyeglasses and case with you. No contacts are to be worn the day of surgery. You also may bring your hearing aids. ·Do not bring any valuables, such as jewelry, cash or credit cards. If you are staying overnight with us, please bring a SMALL bag of personal items. We cannot accommodate large items, like suitcases. ·If your child is having surgery please make arrangements for any other children to be cared for at home on the day of surgery. Other children are not permitted in recovery room and we want you to be able to spend time with the patient. If other arrangements are not available then we suggest that you have a second adult to stay in the waiting room. ·If you are having any type of anesthesia you are to have nothing to eat or drink after midnight the night before your surgery. This includes gum, mints, water or smoking or chewing tobacco.  The only exception to this is a small sip of water to take with any morning dose of heart, blood pressure, or seizure medications. ·Bring your inhaler if you are currently using one. ·Bring your blood band if one has been given to you. Please do not close the clasp. ·If you are on C-PAP or Bi-PAP at home and plan on staying in the hospital overnight for your surgery please bring the machine with you. ·Do not wear any jewelry or body piercings day of surgery. Also, NO lotion, perfume or deodorant to be used the day of surgery. If you have any other questions regarding your procedure/surgery please call  your surgeon's office.      If you have a last minute question(s) the DAY OF your surgery, you may call [phone]

## 2022-09-29 NOTE — CARE COORDINATION
Ambulatory Care Coordination Note  2022    ACC: Yen Chase, RN    Ambulatory Care Manager [facility]) contacted the patient by telephone to follow up on progress, discuss new issues or concerns, and reinforce/ provide patient education. Verified name and  with patient as identifiers. Patient is continuing aquatic therapy. States this continues to help her left leg. Right leg is stronger than left leg. Patient had EMG of bilateral upper extremities and is scheduled for cubital tunnel release (left extremity) on 10/17/2022. Patient states she has no questions or concerns at this time. Reinforced/ Provided Education:  Discussed red flags and appropriate site of care based on symptoms and resources available to patient including: PCP  Specialist  Benefits related nurse triage line  Urgent care clinics  [facility]   When to call [address] Importance and benefits of: Follow up with PCP and specialist, medication adherence, self monitoring and reporting of symptoms. Plan:  Continue outreaches after surgery to provide telephonic support, education and resources as needed. Discuss / follow up on: Goal progress and outcome of cubital tunnel release     Pt verbalized understanding and is agreeable to follow up contact. Lab Results       None                 Goals Addressed                   This Visit's Progress     Conditions and Symptoms   On track     I will schedule office visits, as directed by my provider. I will keep my appointment or reschedule if I have to cancel. I will notify my provider of any barriers to my plan of care. I will notify my provider of any symptoms that indicate a worsening of my condition. Barriers: none  Plan for overcoming my barriers: work with [facility]  Confidence: 10/10  Anticipated Goal Completion Date: 2022                Prior to Admission medications    Medication Sig Start Date End Date Taking?  Authorizing Provider   sertraline (ZOLOFT) 50 MG tablet TAKE ONE TABLET BY MOUTH EVERY MORNING 9/21/22   Preethi Romero MD   TRULICITY 1.5 MJ/1.2CK [facility] INJECT THE CONTENTS OF ONE SYRINGE UNDER THE SKIN ONCE WEEKLY 9/21/22   Preethi Romero MD   celecoxib (CELEBREX) 100 MG capsule TAKE 1 CAPSULE BY MOUTH 2 TIMES A DAY 9/8/22   Mariano Price MD   SYNJARDY 5-1000 MG TABS TAKE 1 TABLET BY MOUTH 2 TIMES A DAY 8/30/22   Preethi Romero MD   lisinopril (PRINIVIL;ZESTRIL) 40 MG tablet Take 1 tablet by mouth daily 8/25/22   Kaiser Sapp MD   busPIRone (BUSPAR) 10 MG tablet TAKE 1 TABLET BY MOUTH 3 TIMES A DAY 8/23/22   Sangeeta Gonzalez MD   Elastic Bandages & Supports (ELBOW BRACE) MISC 1 each by Does not apply route at bedtime Please dispense left cubital tunnel brace 8/18/22 11/16/22  ORLY Nogueira - CNP   ASPIRIN LOW DOSE 81 MG EC tablet TAKE 1 TABLET BY MOUTH ONE TIME A DAY 8/10/22   Preethi Romero MD   alendronate (FOSAMAX) 70 MG tablet Take 1 tablet by mouth every 7 days 7/29/22   Dg Purdy MD   oxyCODONE-acetaminophen (PERCOCET) 5-325 MG per tablet Take by mouth every 4 hours as needed for Pain. Historical Provider, MD   atorvastatin (LIPITOR) 40 MG tablet Take 1 tablet by mouth at bedtime 7/15/22   Anastacia Diez MD   rOPINIRole (REQUIP) 1 MG tablet Take 2 tablets by mouth nightly 7/15/22   Anastacia Diez MD   acetaminophen (TYLENOL 8 HOUR) 650 MG extended release tablet Take 1 tablet by mouth every 8 hours as needed for Pain 7/15/22   Fabio Bishop MD   miconazole (ZEASORB-AF) 2 % powder Apply 43 g topically as needed for Itching Apply topically 2 times daily. 7/15/22   Anastacia Diez MD   furosemide (LASIX) 20 MG tablet Take 0.5 tablets by mouth every other day 7/15/22   Fabio Bishop MD   diclofenac sodium (VOLTAREN) 1 % GEL Apply 2 g topically in the morning and 2 g at noon and 2 g in the evening and 2 g before bedtime.  7/15/22   Anastacia Diez MD   blood glucose test strips (PRODIGY NO CODING BLOOD GLUC) strip Use to test once daily and as needed as directed by provider. Please dispense Prodigy brand. 7/12/22   Bam Pierson MD   blood glucose monitor kit and supplies Dispense sufficient amount for indicated testing frequency plus additional to accommodate PRN testing needs. Dispense all needed supplies to include: monitor, strips, lancing device, lancets, control solutions, alcohol swabs. Prodigy Brand 7/6/22   Bam Pierson MD   Magnesium Oxide 400 MG CAPS Take 400 mg by mouth daily 7/6/22   Mo'Men Josue Gonzalez MD   gabapentin (NEURONTIN) 300 MG capsule Take 1 capsule by mouth 3 times daily for 30 days.  6/9/22 7/9/22  John Vila MD   glipiZIDE (GLUCOTROL) 10 MG tablet Take 1 tab bid 1/13/22   Bam Pierson MD   omeprazole (PRILOSEC) 20 MG delayed release capsule Take 1 capsule by mouth 2 times daily 7/26/21   Bam Pierson MD   albuterol sulfate HFA (PROVENTIL HFA) 108 (90 Base) MCG/ACT inhaler Inhale 2 puffs into the lungs every 4 hours as needed for Wheezing 4/20/21 4/20/22  Bam Pierson MD   repaglinide (PRANDIN) 1 MG tablet TAKE 1 TABLET BY MOUTH 3 TIMES A DAY BEFORE MEALS 12/8/20   Bam Pierson MD   bimatoprost (LUMIGAN) 0.01 % SOLN ophthalmic drops Place 1 drop into both eyes nightly 4/26/18   Historical Provider, MD   PRODIGY LANCETS 28G MISC 1 Bottle by Does not apply route three times daily 4/26/19   Bam Pierson MD   Blood Glucose Monitoring Suppl (PRODIGY AUTOCODE BLOOD GLUCOSE) w/Device KIT 1 Device by Does not apply route 3 times daily 4/26/19   Bam Pierson MD       Future Appointments   Date Time Provider Nicola Zaidi   9/30/2022  9:00 AM MELVIN Najera MOB OT Susan Bodily   10/4/2022 11:00 AM ST[facility] PAT RM 1 [facility]Z PAT [facility]   10/4/2022  2:00 PM MELVIN Najera MOB OT Susan Bodily   10/4/2022  2:45 PM Charo An, PT ST[facility]KARI MOB PT Susan Pandya   10/25/2022  9:30 AM MD Paulina Abdalla [facility]   11/1/2022  3:00 PM [address] [place] [address], APRN - [facility] Artemio Neuro [facility]   11/9/2022  2:00 PM Omkar Luther MD [facility]   12/20/2022 10:30 AM DO Artemio Chamberlain Neuro [facility]

## 2022-09-29 NOTE — TELEPHONE ENCOUNTER
Spoke to patient to inform her that PCP will not be available on 10/25/22, and that patient can either reschedule for a later date with PCP, or can be rescheduled with another physician for same day as scheduled appointment. Patient said she would have liked to see her PCP, but she will see a resident. Patient scheduled with Dr. Lauren Cunningham for same day and time as previous appointment with PCP.

## 2022-09-30 ENCOUNTER — APPOINTMENT (OUTPATIENT)
Dept: PHYSICAL THERAPY | Age: 62
End: 2022-09-30
Payer: COMMERCIAL

## 2022-09-30 ENCOUNTER — HOSPITAL ENCOUNTER (OUTPATIENT)
Dept: OCCUPATIONAL THERAPY | Age: 62
Setting detail: THERAPIES SERIES
Discharge: HOME | End: 2022-09-30
Payer: COMMERCIAL

## 2022-09-30 ENCOUNTER — HOSPITAL ENCOUNTER (INPATIENT)
Age: 62
LOS: 7 days | Discharge: HOME | DRG: 552 | End: 2022-10-09
Attending: EMERGENCY MEDICINE | Admitting: EMERGENCY MEDICINE
Payer: COMMERCIAL

## 2022-09-30 DIAGNOSIS — M54.32 SCIATICA OF LEFT SIDE: Primary | ICD-10-CM

## 2022-09-30 DIAGNOSIS — M54.9 BACK PAIN WITH RADIATION: ICD-10-CM

## 2022-09-30 PROBLEM — M54.30 SCIATICA: Status: ACTIVE | Noted: 2022-09-30

## 2022-09-30 LAB
SARS-COV-2, RAPID: NOT DETECTED
SPECIMEN DESCRIPTION: NORMAL

## 2022-09-30 PROCEDURE — 6370000000 HC RX 637 (ALT 250 FOR IP): Performed by: STUDENT IN AN ORGANIZED HEALTH CARE EDUCATION/TRAINING PROGRAM

## 2022-09-30 PROCEDURE — 96372 THER/PROPH/DIAG INJ SC/IM: CPT

## 2022-09-30 PROCEDURE — 87635 SARS-COV-2 COVID-19 AMP PRB: CPT

## 2022-09-30 PROCEDURE — 6360000002 HC RX W HCPCS: Performed by: STUDENT IN AN ORGANIZED HEALTH CARE EDUCATION/TRAINING PROGRAM

## 2022-09-30 PROCEDURE — G0378 HOSPITAL OBSERVATION PER HR: HCPCS

## 2022-09-30 PROCEDURE — 99285 EMERGENCY DEPT VISIT HI MDM: CPT

## 2022-09-30 PROCEDURE — 2580000003 HC RX 258: Performed by: STUDENT IN AN ORGANIZED HEALTH CARE EDUCATION/TRAINING PROGRAM

## 2022-09-30 PROCEDURE — 97110 THERAPEUTIC EXERCISES: CPT

## 2022-09-30 RX ORDER — ONDANSETRON 4 MG/1
4 TABLET, ORALLY DISINTEGRATING ORAL EVERY 8 HOURS PRN
Status: DISCONTINUED | OUTPATIENT
Start: 2022-09-30 | End: 2022-10-09 | Stop reason: HOSPADM

## 2022-09-30 RX ORDER — ROPINIROLE 1 MG/1
2 TABLET, FILM COATED ORAL NIGHTLY
Status: DISCONTINUED | OUTPATIENT
Start: 2022-09-30 | End: 2022-10-09 | Stop reason: HOSPADM

## 2022-09-30 RX ORDER — ORPHENADRINE CITRATE 30 MG/ML
60 INJECTION INTRAMUSCULAR; INTRAVENOUS ONCE
Status: COMPLETED | OUTPATIENT
Start: 2022-09-30 | End: 2022-09-30

## 2022-09-30 RX ORDER — SODIUM CHLORIDE 9 MG/ML
INJECTION, SOLUTION INTRAVENOUS PRN
Status: DISCONTINUED | OUTPATIENT
Start: 2022-09-30 | End: 2022-10-09 | Stop reason: HOSPADM

## 2022-09-30 RX ORDER — ATORVASTATIN CALCIUM 40 MG/1
40 TABLET, FILM COATED ORAL NIGHTLY
Status: DISCONTINUED | OUTPATIENT
Start: 2022-09-30 | End: 2022-10-09 | Stop reason: HOSPADM

## 2022-09-30 RX ORDER — POLYETHYLENE GLYCOL 3350 17 G/17G
17 POWDER, FOR SOLUTION ORAL DAILY PRN
Status: DISCONTINUED | OUTPATIENT
Start: 2022-09-30 | End: 2022-10-09 | Stop reason: HOSPADM

## 2022-09-30 RX ORDER — GABAPENTIN 300 MG/1
300 CAPSULE ORAL 3 TIMES DAILY
Status: DISCONTINUED | OUTPATIENT
Start: 2022-09-30 | End: 2022-10-09 | Stop reason: HOSPADM

## 2022-09-30 RX ORDER — ACETAMINOPHEN 500 MG
1000 TABLET ORAL EVERY 8 HOURS SCHEDULED
Status: DISCONTINUED | OUTPATIENT
Start: 2022-09-30 | End: 2022-10-09 | Stop reason: HOSPADM

## 2022-09-30 RX ORDER — KETOROLAC TROMETHAMINE 30 MG/ML
30 INJECTION, SOLUTION INTRAMUSCULAR; INTRAVENOUS ONCE
Status: COMPLETED | OUTPATIENT
Start: 2022-09-30 | End: 2022-09-30

## 2022-09-30 RX ORDER — CELECOXIB 100 MG/1
100 CAPSULE ORAL 2 TIMES DAILY
Status: DISCONTINUED | OUTPATIENT
Start: 2022-09-30 | End: 2022-10-09 | Stop reason: HOSPADM

## 2022-09-30 RX ORDER — ASPIRIN 81 MG/1
81 TABLET ORAL ONCE
Status: COMPLETED | OUTPATIENT
Start: 2022-09-30 | End: 2022-09-30

## 2022-09-30 RX ORDER — ACETAMINOPHEN 500 MG
1000 TABLET ORAL ONCE
Status: COMPLETED | OUTPATIENT
Start: 2022-09-30 | End: 2022-09-30

## 2022-09-30 RX ORDER — OXYCODONE HYDROCHLORIDE 5 MG/1
5 TABLET ORAL EVERY 4 HOURS PRN
Status: DISCONTINUED | OUTPATIENT
Start: 2022-09-30 | End: 2022-10-01

## 2022-09-30 RX ORDER — LIDOCAINE 4 G/G
1 PATCH TOPICAL DAILY
Status: DISCONTINUED | OUTPATIENT
Start: 2022-09-30 | End: 2022-10-09 | Stop reason: HOSPADM

## 2022-09-30 RX ORDER — LISINOPRIL 20 MG/1
40 TABLET ORAL DAILY
Status: DISCONTINUED | OUTPATIENT
Start: 2022-10-01 | End: 2022-10-09 | Stop reason: HOSPADM

## 2022-09-30 RX ORDER — FUROSEMIDE 20 MG/1
10 TABLET ORAL EVERY OTHER DAY
Status: DISCONTINUED | OUTPATIENT
Start: 2022-10-01 | End: 2022-10-09 | Stop reason: HOSPADM

## 2022-09-30 RX ORDER — LANOLIN ALCOHOL/MO/W.PET/CERES
400 CREAM (GRAM) TOPICAL DAILY
Status: DISCONTINUED | OUTPATIENT
Start: 2022-10-01 | End: 2022-10-09 | Stop reason: HOSPADM

## 2022-09-30 RX ORDER — GLIPIZIDE 10 MG/1
10 TABLET ORAL
Status: DISCONTINUED | OUTPATIENT
Start: 2022-10-01 | End: 2022-10-09 | Stop reason: HOSPADM

## 2022-09-30 RX ORDER — ORPHENADRINE CITRATE 30 MG/ML
60 INJECTION INTRAMUSCULAR; INTRAVENOUS EVERY 12 HOURS
Status: DISCONTINUED | OUTPATIENT
Start: 2022-10-01 | End: 2022-10-01

## 2022-09-30 RX ORDER — PANTOPRAZOLE SODIUM 40 MG/1
40 TABLET, DELAYED RELEASE ORAL
Status: DISCONTINUED | OUTPATIENT
Start: 2022-10-01 | End: 2022-10-09 | Stop reason: HOSPADM

## 2022-09-30 RX ORDER — SODIUM CHLORIDE 0.9 % (FLUSH) 0.9 %
5-40 SYRINGE (ML) INJECTION PRN
Status: DISCONTINUED | OUTPATIENT
Start: 2022-09-30 | End: 2022-10-09 | Stop reason: HOSPADM

## 2022-09-30 RX ORDER — ONDANSETRON 2 MG/ML
4 INJECTION INTRAMUSCULAR; INTRAVENOUS EVERY 6 HOURS PRN
Status: DISCONTINUED | OUTPATIENT
Start: 2022-09-30 | End: 2022-10-09 | Stop reason: HOSPADM

## 2022-09-30 RX ORDER — BUSPIRONE HYDROCHLORIDE 10 MG/1
10 TABLET ORAL 3 TIMES DAILY
Status: DISCONTINUED | OUTPATIENT
Start: 2022-09-30 | End: 2022-10-09 | Stop reason: HOSPADM

## 2022-09-30 RX ORDER — SODIUM CHLORIDE 0.9 % (FLUSH) 0.9 %
5-40 SYRINGE (ML) INJECTION EVERY 12 HOURS SCHEDULED
Status: DISCONTINUED | OUTPATIENT
Start: 2022-09-30 | End: 2022-10-09 | Stop reason: HOSPADM

## 2022-09-30 RX ADMIN — CELECOXIB 100 MG: 100 CAPSULE ORAL at 22:43

## 2022-09-30 RX ADMIN — Medication 81 MG: at 22:42

## 2022-09-30 RX ADMIN — GABAPENTIN 300 MG: 300 CAPSULE ORAL at 22:43

## 2022-09-30 RX ADMIN — KETOROLAC TROMETHAMINE 30 MG: 30 INJECTION, SOLUTION INTRAMUSCULAR at 16:05

## 2022-09-30 RX ADMIN — BUSPIRONE HYDROCHLORIDE 10 MG: 10 TABLET ORAL at 22:42

## 2022-09-30 RX ADMIN — ACETAMINOPHEN 1000 MG: 500 TABLET ORAL at 16:05

## 2022-09-30 RX ADMIN — DESMOPRESSIN ACETATE 40 MG: 0.2 TABLET ORAL at 22:42

## 2022-09-30 RX ADMIN — ORPHENADRINE CITRATE 60 MG: 30 INJECTION INTRAMUSCULAR; INTRAVENOUS at 16:06

## 2022-09-30 RX ADMIN — ACETAMINOPHEN 1000 MG: 500 TABLET ORAL at 22:42

## 2022-09-30 RX ADMIN — HYDROMORPHONE HYDROCHLORIDE 1 MG: 1 INJECTION, SOLUTION INTRAMUSCULAR; INTRAVENOUS; SUBCUTANEOUS at 17:20

## 2022-09-30 RX ADMIN — OXYCODONE 5 MG: 5 TABLET ORAL at 22:42

## 2022-09-30 RX ADMIN — ROPINIROLE HYDROCHLORIDE 2 MG: 1 TABLET, FILM COATED ORAL at 22:43

## 2022-09-30 RX ADMIN — SODIUM CHLORIDE, PRESERVATIVE FREE 10 ML: 5 INJECTION INTRAVENOUS at 22:53

## 2022-09-30 SDOH — ECONOMIC STABILITY: TRANSPORTATION INSECURITY
IN THE PAST 12 MONTHS, HAS THE LACK OF TRANSPORTATION KEPT YOU FROM MEDICAL APPOINTMENTS OR FROM GETTING MEDICATIONS?: NO

## 2022-09-30 SDOH — ECONOMIC STABILITY: FOOD INSECURITY: WITHIN THE PAST 12 MONTHS, YOU WORRIED THAT YOUR FOOD WOULD RUN OUT BEFORE YOU GOT MONEY TO BUY MORE.: NEVER TRUE

## 2022-09-30 SDOH — HEALTH STABILITY: PHYSICAL HEALTH: ON AVERAGE, HOW MANY DAYS PER WEEK DO YOU ENGAGE IN MODERATE TO STRENUOUS EXERCISE (LIKE A BRISK WALK)?: 0 DAYS

## 2022-09-30 SDOH — ECONOMIC STABILITY: HOUSING INSECURITY
IN THE LAST 12 MONTHS, WAS THERE A TIME WHEN YOU DID NOT HAVE A STEADY PLACE TO SLEEP OR SLEPT IN A SHELTER (INCLUDING NOW)?: NO

## 2022-09-30 SDOH — ECONOMIC STABILITY: TRANSPORTATION INSECURITY
IN THE PAST 12 MONTHS, HAS LACK OF TRANSPORTATION KEPT YOU FROM MEETINGS, WORK, OR FROM GETTING THINGS NEEDED FOR DAILY LIVING?: NO

## 2022-09-30 SDOH — ECONOMIC STABILITY: FOOD INSECURITY: WITHIN THE PAST 12 MONTHS, THE FOOD YOU BOUGHT JUST DIDN'T LAST AND YOU DIDN'T HAVE MONEY TO GET MORE.: NEVER TRUE

## 2022-09-30 SDOH — ECONOMIC STABILITY: INCOME INSECURITY: IN THE LAST 12 MONTHS, WAS THERE A TIME WHEN YOU WERE NOT ABLE TO PAY THE MORTGAGE OR RENT ON TIME?: NO

## 2022-09-30 SDOH — ECONOMIC STABILITY: HOUSING INSECURITY: IN THE LAST 12 MONTHS, HOW MANY PLACES HAVE YOU LIVED?: 1

## 2022-09-30 ASSESSMENT — PATIENT HEALTH QUESTIONNAIRE - PHQ9
DEPRESSION UNABLE TO ASSESS: YES
2. FEELING DOWN, DEPRESSED OR HOPELESS: SEVERAL DAYS
1. LITTLE INTEREST OR PLEASURE IN DOING THINGS: SEVERAL DAYS
SUM OF ALL RESPONSES TO PHQ9 QUESTIONS 1 & 2: 2
10. IF YOU CHECKED OFF ANY PROBLEMS, HOW DIFFICULT HAVE THESE PROBLEMS MADE IT FOR YOU TO DO YOUR WORK, TAKE CARE OF THINGS AT HOME, OR GET ALONG WITH OTHER PEOPLE: NOT DIFFICULT AT ALL

## 2022-09-30 ASSESSMENT — PAIN DESCRIPTION - DESCRIPTORS
DESCRIPTORS: PENETRATING
DESCRIPTORS: ACHING;DISCOMFORT;SHARP;SHOOTING

## 2022-09-30 ASSESSMENT — PAIN SCALES - GENERAL
PAINLEVEL_OUTOF10: 10
PAINLEVEL_OUTOF10: 8
PAINLEVEL_OUTOF10: 6
PAINLEVEL_OUTOF10: 10

## 2022-09-30 ASSESSMENT — PAIN DESCRIPTION - LOCATION
LOCATION: BACK;HIP;LEG
LOCATION: BACK
LOCATION: BACK;HIP;LEG

## 2022-09-30 ASSESSMENT — PAIN DESCRIPTION - PAIN TYPE: TYPE: ACUTE PAIN;CHRONIC PAIN

## 2022-09-30 ASSESSMENT — PAIN DESCRIPTION - DIRECTION: RADIATING_TOWARDS: LEFT FOOT

## 2022-09-30 ASSESSMENT — PAIN - FUNCTIONAL ASSESSMENT
PAIN_FUNCTIONAL_ASSESSMENT: PREVENTS OR INTERFERES SOME ACTIVE ACTIVITIES AND ADLS
PAIN_FUNCTIONAL_ASSESSMENT: 0-10
PAIN_FUNCTIONAL_ASSESSMENT: 0-10
PAIN_FUNCTIONAL_ASSESSMENT: PREVENTS OR INTERFERES SOME ACTIVE ACTIVITIES AND ADLS

## 2022-09-30 ASSESSMENT — LIFESTYLE VARIABLES
HOW MANY STANDARD DRINKS CONTAINING ALCOHOL DO YOU HAVE ON A TYPICAL DAY: 1 OR 2
HOW OFTEN DO YOU HAVE A DRINK CONTAINING ALCOHOL: MONTHLY OR LESS
HOW MANY STANDARD DRINKS CONTAINING ALCOHOL DO YOU HAVE ON A TYPICAL DAY: 1 OR 2
HOW OFTEN DO YOU HAVE A DRINK CONTAINING ALCOHOL: NEVER

## 2022-09-30 ASSESSMENT — PAIN DESCRIPTION - ORIENTATION
ORIENTATION: LOWER
ORIENTATION: LEFT
ORIENTATION: LEFT

## 2022-09-30 ASSESSMENT — ENCOUNTER SYMPTOMS
COUGH: 0
VOMITING: 0
RHINORRHEA: 0
NAUSEA: 0
SHORTNESS OF BREATH: 0
DIARRHEA: 0
ABDOMINAL PAIN: 0
BACK PAIN: 1

## 2022-09-30 ASSESSMENT — SOCIAL DETERMINANTS OF HEALTH (SDOH)
IN A TYPICAL WEEK, HOW MANY TIMES DO YOU TALK ON THE PHONE WITH FAMILY, FRIENDS, OR NEIGHBORS?: MORE THAN THREE TIMES A WEEK
WITHIN THE LAST YEAR, HAVE TO BEEN RAPED OR FORCED TO HAVE ANY KIND OF SEXUAL ACTIVITY BY YOUR PARTNER OR EX-PARTNER?: NO
WITHIN THE LAST YEAR, HAVE YOU BEEN AFRAID OF YOUR PARTNER OR EX-PARTNER?: NO
WITHIN THE LAST YEAR, HAVE YOU BEEN KICKED, HIT, SLAPPED, OR OTHERWISE PHYSICALLY HURT BY YOUR PARTNER OR EX-PARTNER?: NO
HOW OFTEN DO YOU ATTEND CHURCH OR RELIGIOUS SERVICES?: MORE THAN 4 TIMES PER YEAR
WITHIN THE LAST YEAR, HAVE YOU BEEN HUMILIATED OR EMOTIONALLY ABUSED IN OTHER WAYS BY YOUR PARTNER OR EX-PARTNER?: NO
HOW OFTEN DO YOU GET TOGETHER WITH FRIENDS OR RELATIVES?: ONCE A WEEK
HOW HARD IS IT FOR YOU TO PAY FOR THE VERY BASICS LIKE FOOD, HOUSING, MEDICAL CARE, AND HEATING?: NOT HARD AT ALL
DO YOU BELONG TO ANY CLUBS OR ORGANIZATIONS SUCH AS CHURCH GROUPS UNIONS, FRATERNAL OR ATHLETIC GROUPS, OR SCHOOL GROUPS?: YES
HOW OFTEN DO YOU ATTENT MEETINGS OF THE CLUB OR ORGANIZATION YOU BELONG TO?: MORE THAN 4 TIMES PER YEAR

## 2022-09-30 ASSESSMENT — PAIN DESCRIPTION - FREQUENCY: FREQUENCY: CONTINUOUS

## 2022-09-30 NOTE — ED TRIAGE NOTES
Pt presents to the ED with c/o of left sided sciatic pain. Pt states she has history of back pain with recent MRI done in June. Pt states she is having upcoming serve surgery on her elbow to help with pain. Pt states pain started last night and has progressively gotten worse today. Pt is tearful upon arrival.   Pt states pain is 10/10, pt took percocet and Zanaflex around noon PTA and states no relief. Pt denies any issues with bowel and bladder.

## 2022-09-30 NOTE — ED NOTES
Patient ambulated to bathroom with spouse. Pt using cane for ambulation.        Luis Fleming RN  09/30/22 2105

## 2022-09-30 NOTE — ED PROVIDER NOTES
[address] ED     Emergency Department     Faculty Attestation        I performed a history and physical examination of the patient and discussed management with the resident. I reviewed the residents note and agree with the documented findings and plan of care. Any areas of disagreement are noted on the chart. I was personally present for the key portions of any procedures. I have documented in the chart those procedures where I was not present during the key portions. I have reviewed the emergency nurses triage note. I agree with the chief complaint, past medical history, past surgical history, allergies, medications, social and family history as documented unless otherwise noted below. For mid-level providers such as nurse practitioners as well as physicians assistants:    I have personally seen and evaluated the patient. I find the patient's history and physical exam are consistent with NP/PA documentation. I agree with the care provided, treatment rendered, disposition, & follow-up plan. Additional findings are as noted. Vital Signs: BP (!) 165/84   Pulse 81   Temp 97.5 °F (36.4 °C) (Oral)   Resp 18   Ht 5' 2\" (1.575 m)   Wt 185 lb (83.9 kg)   SpO2 96%   BMI 33.84 kg/m²   PCP:  Virginia Celis MD    Pertinent Comments:     Complains of left lower back pain. She has a history of sciatica and was admitted in May for this and had a 4-day hospital stay and extensive work-up which was unremarkable she has seen PT OT. She states she turned over in her bed and felt pain or significant sciatica. No bowel or bladder incontinence. Pain does radiate down the leg but no numbness tingling or weakness. Exam she is afebrile nontoxic. She is laying on her side appears uncomfortable. She has 5 out of 5 strength in bilateral lower extremities.   Doubt significant spinal pathology such as significant disc herniation or protrusion, cauda equina or epidural abscess.   Will provide analgesics, muscle relaxants, reassessment      Critical Care  None          Alan Summers MD    Attending Emergency Medicine Physician            Jorie Bumpers, MD  09/30/22 4876

## 2022-09-30 NOTE — ED NOTES
Dr. Tejal Randolph at bedside     Ric Howard, [facility]  09/30/22 6602

## 2022-09-30 NOTE — PROGRESS NOTES
SPIRITUAL CARE DEPARTMENT - [address]  PROGRESS NOTE    Shift date: 9/30/2022  Shift day: Friday   Shift # 2    Room # 02/02   Name: Estelle Palomino                Yazidi: Zoroastrianism     Referral: Routine Visit    Admit Date & Time: 9/30/2022  3:23 PM    Assessment:  Estelle Palomino is a 65 y.o. female in the hospital because \"back pain. \" Upon entering the room writer observes the patient laying on their side, constantly adjusting their position, and regularly wincing in pain. The patient's significant other was sitting in chair facing the back of the patient. The patient appeared to be in extreme pain yet coping. And the patient's significant other appeared to be coping and expressed mild frustration at the inability of the medication to calm the patient's pain. The patient shared about their previous hospitalization in June for the same the injury as well as other injuries that resulted from their hospitalization and employment. The patient also engaged in [address] [place] [address]. Intervention:  Writer introduced self and title as  Writer offered space for the patient and patient's significant other  to express feelings, needs, and concerns and provided a ministry presence. The writer also provided prayer and at the patient's request healing touch to the patient's injury. Outcome: The patient and the patient's significant other appeared to welcome the visit and expressed gratitude. Plan:  Chaplains will remain available to offer spiritual and emotional support as needed. 09/30/22 1719   Encounter Summary   Service Provided For: Patient and family together   Referral/Consult From: [address] [facility] Significant other   Last Encounter  09/30/22   Complexity of Encounter Low   Begin Time 1700   End Time  1720   Total Time Calculated 20 min   Assessment/Intervention/Outcome   Assessment Coping;Concerns with suffering; Powerlessness   Intervention Sustaining Presence/Ministry of presence;Prayer (assurance of)/[place];Guided Imagery, Healing touch, etc.;Explored/Affirmed feelings, thoughts, concerns; Active listening   Outcome Expressed Gratitude;Expressed feelings, needs, and concerns;Engaged in conversation       Electronically signed by Shahab [place] , on 9/30/2022 at 5:24 PM.  [facility]  [phone]

## 2022-09-30 NOTE — FLOWSHEET NOTE
[] [place]       Occupational Therapy            1st floor       [address] [place], [place]         Phone: [phone]       Fax: [phone] [] [address] Occupational Therapy  [address]  [place], [place]  Phone: [phone]  Fax: [phone]  [x] [facility] ([facility]) @ [facility]  [address] [facility] [address]  [place], [address] [place] [address]  Phone [phone]  Fax [phone]      Occupational Therapy Daily Treatment Note    Date:  2022  Patient Name:  Benito Powell    :  1960  MRN: [id number]  Referring Provider:  Edwina White MD  Insurance: Other [facility]  Medical Diagnosis: Retrolisthesis of vertebrae (M43.10) Bilateral Carpal Tunnel (G56.03)     Rehab Codes: pain in right upper arm M79.621,, pain in left upper arm M79.622, , lack of coordination unspecified R27.0,, or parasthesia of skin R20.2,  Onset Date: 22                 Next Dr. Ron Whitmore: 22  Visit# / total visits: ; Progress note for Medicare patient due at visit 8-9    Cancels/No Shows: 0/0      Subjective:    Pain:  [x] Yes  [] No Location: Low back Pain Rating: (0-10 scale) 8/10  Pain altered Tx:  [] No  [x] Yes  Action: completed only seated exercises. Pt Comments: Pt reports she was laying in bed last night when she rolled over and felt a sharp/shooting pn in back that traveled down her leg. B UE remain the same w/ numbness and tingling. Pt reports cubital tunnel release has been moved up to 10/17/22.       Objective:  Modalities:  Precautions: N/A  Exercises:  Exrcise Reps/Time Weight/Level Comments   Tendon glide 10   Completed   Ulnar nerve glide 2x10   Shoulder 90 w/ elbow flex/ext   Completed   gripping 2 minutes yellow Theraputty    Thumb opposition 10   Completed   Scapula retraction   3x10 Green   Completed    Shoulder extention 3x10   Green  Completed   Tricep extension 3x10 green    Clip placing  Yellow/red/green/Blue/Black Completed   [address] 4 rows   Not completed   Placing pegs in board   Rows 3x10 green  Not completed   PNF 3x10 Green  Not completed  rt UE going from rt shoulder to lt hip 10x , PNF diagonal lt UE going from lt shoudler to rt hip   Flexbar    Wrist Flexion/Ext  Wrist pronation  Wrist supination 3x10 red completed   Serratus anterior wall slide w/ lift off 2x10 AROM  Not Completed    Other:      Treatment Charges: Mins Units   []  Modalities:      []  Ultrasound     [x]  Ther Exercise 40 3   []  Manual Therapy     []  Ther Activities     []  Orthotic fit/train     []  Orthotic recheck     []  Other     Total Treatment time 40 3       Assessment: [x] Progressing toward goals. Pt arrived to OT session moving very slow reporting 8/10 pain in back. Adjusted tx to accommodate for pn in the back. Pts cubital tunnel release for L UE has been moved up to 10/17/22. Pt fatigued quickly today w/  and pinch strengthening exercises. Completed light strengthening and ulnar nerve glides on this date. Cont w/ massage  throughout ulnar nerve pathway. [] No change. [] Other     [x] Patient would continue to benefit from skilled occupational therapy services in order to: Improve, Increase, and Restore  functional /grasp, Motor control/Tone in UE, Strength, Activity tolerance, and Coordination deficit in order to improve functional use of UE in ADL performance and return to PLOF     STG/LTG  Short Term Goals: (  8    Treatments)  Decrease numbness/tingling per subjective report   Increase strength (pounds);  strength by 5 lbs  Increase pinch strength by 3lbs  Pt will increase 9-hole pef test by 10 seconds on each UE  Increase function:UE Functional Index Score 8 or more points to promote increased functional abilities  Demo knowledge of fall prevention in 2 sessions. Patient to be independent with home exercise program as demonstrated by performance with correct form without cues.      Long Term Goals: (  16  Treatments)  Pt will increase 9 hole peg test time by 15 seconds  Pt will improve UEFI score by 15 points  Pt will increase  strength by 10 lbs  Pt will increase pinch strength by 5 lbs    Pt. Education:  [] Yes  [x] No  [] Reviewed Prior HEP/Ed  Method of Education: [x] Verbal  [] Demo  [] Written  Re:  Comprehension of Education:  [] Verbalizes understanding. [] Demonstrates understanding. [] Needs review. [] Demonstrates/verbalizes HEP/Ed previously given. Plan: [x] Continue current frequency toward short and long term goals. [x] Specific Instructions for subsequent treatments: Cont w/ nerve glides and strengthening.    [] Other:       Time In: 0900  Time Out: 0940      Electronically signed by:  YAAKOV Sparks/CARMEN

## 2022-09-30 NOTE — ED PROVIDER NOTES
needed as directed by provider. Please dispense Prodigy brand. BUSPIRONE (BUSPAR) 10 MG TABLET    TAKE 1 TABLET BY MOUTH 3 TIMES A DAY    CELECOXIB (CELEBREX) 100 MG CAPSULE    TAKE 1 CAPSULE BY MOUTH 2 TIMES A DAY    DICLOFENAC SODIUM (VOLTAREN) 1 % GEL    Apply 2 g topically in the morning and 2 g at noon and 2 g in the evening and 2 g before bedtime. ELASTIC BANDAGES & SUPPORTS (ELBOW BRACE) MISC    1 each by Does not apply route at bedtime Please dispense left cubital tunnel brace    FUROSEMIDE (LASIX) 20 MG TABLET    Take 0.5 tablets by mouth every other day    GABAPENTIN (NEURONTIN) 300 MG CAPSULE    Take 1 capsule by mouth 3 times daily for 30 days. GLIPIZIDE (GLUCOTROL) 10 MG TABLET    Take 1 tab bid    LISINOPRIL (PRINIVIL;ZESTRIL) 40 MG TABLET    Take 1 tablet by mouth daily    MAGNESIUM OXIDE 400 MG CAPS    Take 400 mg by mouth daily    MICONAZOLE (ZEASORB-AF) 2 % POWDER    Apply 43 g topically as needed for Itching Apply topically 2 times daily. OMEPRAZOLE (PRILOSEC) 20 MG DELAYED RELEASE CAPSULE    Take 1 capsule by mouth 2 times daily    OXYCODONE-ACETAMINOPHEN (PERCOCET) 5-325 MG PER TABLET    Take by mouth every 4 hours as needed for Pain. PRODIGY LANCETS 28G MISC    1 Bottle by Does not apply route three times daily    ROPINIROLE (REQUIP) 1 MG TABLET    Take 2 tablets by mouth nightly    SERTRALINE (ZOLOFT) 50 MG TABLET    TAKE ONE TABLET BY MOUTH EVERY MORNING    SYNJARDY 5-1000 MG TABS    TAKE 1 TABLET BY MOUTH 2 TIMES A DAY    TRULICITY 1.5 ZT/5.5WR SOPN    INJECT THE CONTENTS OF ONE SYRINGE UNDER THE SKIN ONCE WEEKLY       Encounter Medications  Orders Placed This Encounter   Medications    acetaminophen (TYLENOL) tablet 1,000 mg    ketorolac (TORADOL) injection 30 mg    lidocaine 4 % external patch 1 patch    orphenadrine (NORFLEX) injection 60 mg    HYDROmorphone (DILAUDID) injection 1 mg       ALLERGIES     is allergic to codeine.       RECENT VITALS:   Temp: 97.5 °F (36.4 °C), Heart Rate: 81, Resp: 20, BP: (!) 148/80    RADIOLOGY:   No orders to display       LABS:  Labs Reviewed - No data to display    Exacerbation of her chronic left sciatica. No weakness. No red flags for epidural abscess or neurologic dysfunction. Patient may require admission for PT/OT    PLAN/ TASKS OUTSTANDING       Analgesics, reassess, disposition    (Please note that portions of this note were completed with a voice recognition program.  Efforts were made to edit the dictations but occasionally words are mis-transcribed. )    Sheridan Cleaning MD,, MD, F.A.C.E.P.   Attending Emergency Physician        Sheridan Cleaning MD  09/30/22 0722

## 2022-09-30 NOTE — ED NOTES
Patient here c/o left lower back and left hip pain that extends into posterior of LLE. Pt states she was in bed and felt like she \"tweaked\" something in her lower back and started feeling pain. States she has hx of sciatic pain and that symptoms today feel very similar. Pt is unsure what has helped sciatic pain in the past.     On arrival, pt A+Ox4, respirations even and unlabored, speaking in complete sentences. Pt tearful and states she is unable to get comfortable. Pt to ED room in wheelchair. Able to ambulate and stand/pivot transfer.       Kodi Gillespie RN  09/30/22 1704

## 2022-09-30 NOTE — ED PROVIDER NOTES
[facility]  ED  Emergency Department Encounter  Emergency Medicine Resident     Pt Name: Arjun Gill  MRN: [id number]  [place] 1960  Date of evaluation: 22  PCP:  Temi Beasley MD    CHIEF COMPLAINT       Chief Complaint   Patient presents with    Back Pain     \"Sciatica back pain\"        HISTORY OFPRESENT ILLNESS  (Location/Symptom, Timing/Onset, Context/Setting, Quality, Duration, Modifying Factors,Severity.)      Arjun Gill is a 65 y.o. female who presents with left-sided back pain. Patient has extensive history related to sciatica pain. She was admitted for 4 days due to this pain in May 2022. Underwent imaging that showed bulging disc but no other acute injury. Patient has been in regular physical therapy including aquatic therapy since that time. She was doing very well and gaining strength back. She states she may have overdone it last week working at her [employer]. She did not do any heavy lifting but when she rolled over in bed this morning, she had sudden onset of severe pain in the left lower back, radiating through the left buttock into the left leg. Slight tingling down to the foot. No weakness. No recent falls or injuries. No saddle anesthesia. No other complaints. PAST MEDICAL / SURGICAL / SOCIAL / FAMILY HISTORY      has a past medical history of Cervical spondylosis, Generalized anxiety disorder, Hyperlipidemia, Hypertension, Lumbar radiculopathy, DIEGO (nonalcoholic steatohepatitis), Obesity, Osteoarthritis of left knee, Recurrent major depressive disorder ([facility] [address] Restless legs syndrome, and Type II or unspecified type diabetes mellitus without mention of complication, not stated as uncontrolled. has a past surgical history that includes Upper gastrointestinal endoscopy (2012); Colonoscopy (2012);  section; Hysterectomy, total abdominal (); and shoulder surgery (Left, 2021).      Social History     Socioeconomic History    Marital status:      Spouse name: Not on file    Number of children: Not on file    Years of education: Not on file    Highest education level: Not on file   Occupational History    Not on file   Tobacco Use    Smoking status: Never    Smokeless tobacco: Never   Vaping Use    Vaping Use: Never used   Substance and Sexual Activity    Alcohol use: Yes     Alcohol/week: 0.0 standard drinks     Comment: rarely/ 4 times a year    Drug use: No    Sexual activity: Yes   Other Topics Concern    Not on file   Social History Narrative    Not on file     Social Determinants of Health     Financial Resource Strain: Low Risk     Difficulty of Paying Living Expenses: Not very hard   Food Insecurity: No Food Insecurity    Worried About Running Out of Food in the Last Year: Never true    Ran Out of Food in the Last Year: Never true   Transportation Needs: Not on file   Physical Activity: Not on file   Stress: Not on file   Social Connections: Not on file   Intimate Partner Violence: Not on file   Housing Stability: Not on file       Family History   Problem Relation Age of Onset    Heart Attack Mother     Diabetes Mother     Diabetes Father     Heart Attack Father         Allergies:  Codeine    Home Medications:  Prior to Admission medications    Medication Sig Start Date End Date Taking?  Authorizing Provider   sertraline (ZOLOFT) 50 MG tablet TAKE ONE TABLET BY MOUTH EVERY MORNING 9/21/22   Chela Cano MD   TRULICITY 1.5 CI/3.1LQ [facility] INJECT THE CONTENTS OF ONE SYRINGE UNDER THE SKIN ONCE WEEKLY 9/21/22   Chela Cano MD   celecoxib (CELEBREX) 100 MG capsule TAKE 1 CAPSULE BY MOUTH 2 TIMES A DAY 9/8/22   Oma Fregoso MD   SYNJARDY 5-1000 MG TABS TAKE 1 TABLET BY MOUTH 2 TIMES A DAY 8/30/22   Chela Cano MD   lisinopril (PRINIVIL;ZESTRIL) 40 MG tablet Take 1 tablet by mouth daily 8/25/22   Ayo Witt MD   busPIRone (BUSPAR) 10 MG tablet TAKE 1 TABLET BY MOUTH 3 TIMES A DAY 8/23/22   Sangeeta Gonzalez MD   Elastic Bandages & Supports (ELBOW BRACE) MISC 1 each by Does not apply route at bedtime Please dispense left cubital tunnel brace 8/18/22 11/16/22  Sumit Purvis, APRN - CNP   ASPIRIN LOW DOSE 81 MG EC tablet TAKE 1 TABLET BY MOUTH ONE TIME A DAY 8/10/22   Edward Chand MD   alendronate (FOSAMAX) 70 MG tablet Take 1 tablet by mouth every 7 days 7/29/22   Paola Carrero MD   oxyCODONE-acetaminophen (PERCOCET) 5-325 MG per tablet Take by mouth every 4 hours as needed for Pain. Historical Provider, MD   atorvastatin (LIPITOR) 40 MG tablet Take 1 tablet by mouth at bedtime 7/15/22   Muhaddis Rawland Cranker, MD   rOPINIRole (REQUIP) 1 MG tablet Take 2 tablets by mouth nightly 7/15/22   Muhaddis Rawland Cranker, MD   acetaminophen (TYLENOL 8 HOUR) 650 MG extended release tablet Take 1 tablet by mouth every 8 hours as needed for Pain 7/15/22   Kalina Cooper MD   miconazole (ZEASORB-AF) 2 % powder Apply 43 g topically as needed for Itching Apply topically 2 times daily. 7/15/22   Muhaddis Rawland Cranker, MD   furosemide (LASIX) 20 MG tablet Take 0.5 tablets by mouth every other day 7/15/22   Kalina Cooper MD   diclofenac sodium (VOLTAREN) 1 % GEL Apply 2 g topically in the morning and 2 g at noon and 2 g in the evening and 2 g before bedtime. 7/15/22   Muhaddis Rawland Cranker, MD   blood glucose test strips (PRODIGY NO CODING BLOOD GLUC) strip Use to test once daily and as needed as directed by provider. Please dispense Prodigy brand. 7/12/22   Edward Chand MD   blood glucose monitor kit and supplies Dispense sufficient amount for indicated testing frequency plus additional to accommodate PRN testing needs. Dispense all needed supplies to include: monitor, strips, lancing device, lancets, control solutions, alcohol swabs.    Prodigy Brand 7/6/22   Edward Chand MD   Magnesium Oxide 400 MG CAPS Take 400 mg by mouth daily 7/6/22 Mo'Rosanne Hooper MD   gabapentin (NEURONTIN) 300 MG capsule Take 1 capsule by mouth 3 times daily for 30 days. 6/9/22 7/9/22  Latesha Dubon MD   glipiZIDE (GLUCOTROL) 10 MG tablet Take 1 tab bid 1/13/22   Erica Castellanos MD   omeprazole (PRILOSEC) 20 MG delayed release capsule Take 1 capsule by mouth 2 times daily 7/26/21   Erica Castellanos MD   albuterol sulfate HFA (PROVENTIL HFA) 108 (90 Base) MCG/ACT inhaler Inhale 2 puffs into the lungs every 4 hours as needed for Wheezing 4/20/21 4/20/22  Erica Castellanos MD   repaglinide (PRANDIN) 1 MG tablet TAKE 1 TABLET BY MOUTH 3 TIMES A DAY BEFORE MEALS 12/8/20   Erica Castellanos MD   bimatoprost (LUMIGAN) 0.01 % SOLN ophthalmic drops Place 1 drop into both eyes nightly 4/26/18   Historical Provider, MD   PRODIGY LANCETS 28G MISC 1 Bottle by Does not apply route three times daily 4/26/19   Erica Castellanos MD   Blood Glucose Monitoring Suppl (PRODIGY AUTOCODE BLOOD GLUCOSE) w/Device KIT 1 Device by Does not apply route 3 times daily 4/26/19   Erica Castellanos MD       REVIEW OFSYSTEMS    (2-9 systems for level 4, 10 or more for level 5)      Review of Systems   Constitutional:  Negative for chills and fever. HENT:  Negative for congestion and rhinorrhea. Eyes:  Negative for visual disturbance. Respiratory:  Negative for cough and shortness of breath. Cardiovascular:  Negative for chest pain. Gastrointestinal:  Negative for abdominal pain, diarrhea, nausea and vomiting. Genitourinary:  Negative for dysuria. Musculoskeletal:  Positive for back pain. Negative for neck pain. Skin:  Negative for rash. Neurological:  Negative for weakness, numbness and headaches. PHYSICAL EXAM   (up to 7 for level 4, 8 or more forlevel 5)      INITIAL VITALS:   ED Triage Vitals [09/30/22 1518]   BP Temp Temp Source Heart Rate Resp SpO2 Height Weight   (!) 165/84 97.5 °F (36.4 °C) Oral 81 18 96 % 5' 2\" (1.575 m) 185 lb (83.9 kg)       Physical Exam  Constitutional:       Appearance: Normal appearance. She is normal weight.  She is not ill-appearing, toxic-appearing or diaphoretic. Comments: Patient tearful, lying on her right side, clutching to the bed   HENT:      Head: Normocephalic and atraumatic. Nose: Nose normal.      Mouth/Throat:      Mouth: Mucous membranes are moist.      Pharynx: Oropharynx is clear. No oropharyngeal exudate or posterior oropharyngeal erythema. Eyes:      Extraocular Movements: Extraocular movements intact. Pupils: Pupils are equal, round, and reactive to light. Cardiovascular:      Rate and Rhythm: Normal rate and regular rhythm. Heart sounds: Normal heart sounds. No murmur heard. Pulmonary:      Effort: Pulmonary effort is normal. No respiratory distress. Breath sounds: Normal breath sounds. No wheezing, rhonchi or rales. Abdominal:      General: There is no distension. Palpations: Abdomen is soft. Tenderness: There is no abdominal tenderness. There is no guarding or rebound. Musculoskeletal:         General: Normal range of motion. Cervical back: Normal range of motion and neck supple. Right lower leg: No edema. Left lower leg: No edema. Comments: Tenderness palpation over the left SI joint, left piriformis area. Pain exacerbates with any movement of the left leg. Full range of motion of all joints but patient is unwilling to do so. Skin:     General: Skin is warm and dry. Neurological:      General: No focal deficit present. Mental Status: She is alert and oriented to person, place, and time. Motor: No weakness. Comments: Subjective paresthesias in the left lower extremity down to the toes. Mild sensation change compared to right. Weakness on plantarflexion likely secondary to pain. Psychiatric:         Mood and Affect: Mood normal.       DIFFERENTIAL  DIAGNOSIS     PLAN (LABS / IMAGING / EKG):  No orders of the defined types were placed in this encounter.       MEDICATIONS ORDERED:  Orders Placed This Encounter   Medications acetaminophen (TYLENOL) tablet 1,000 mg    ketorolac (TORADOL) injection 30 mg    lidocaine 4 % external patch 1 patch    orphenadrine (NORFLEX) injection 60 mg       DDX: Sciatica, radiculopathy, bulging disc, acute fracture    Initial MDM/Plan/ED COURSE:    65 y.o. female who presents with acute on chronic back pain, with known history of left sciatica. Patient has been [facility] with physical therapy follow-up. On exam, patient is acutely distressed and in pain. She is otherwise stable. She has good neurologic function but unable to fully participate in neurologic exam secondary to pain. Vitals are stable. She has taken Percocet and tizanidine approximately 4 hours ago now. We will plan to treat pain aggressively and work towards discharge home with close follow-up. Will reevaluate after pain medication. ED Course as of 10/01/22 0224   Fri Sep 30, 2022   1707 Patient reevaluated, states she still has 10/10 pain. States all the medications have not helped at all. We will try 1 dose of IM Dilaudid as patient states morphine has not helped in the past.  Will reevaluate for possible discharge after administration [JS]      ED Course User Index  [JS] [place] , DO    :   Patient continues to have 9/10 pain. Unable to ambulate without significant pain. We discussed discharge home with follow-up with her physical therapist versus admission for physical therapy and patient would like to be admitted after discussion with family member. Plan to admit for continued pain management and physical therapy in the morning. DIAGNOSTIC RESULTS / EMERGENCYDEPARTMENT COURSE / MDM     LABS:  Labs Reviewed - No data to display        No results found.       EKG      All EKG's are interpreted by the Emergency Department Physicianwho either signs or Co-signs this chart in the absence of a cardiologist.      PROCEDURES:  None    CONSULTS:  None    CRITICAL CARE:  Please see attending note    FINAL IMPRESSION 1. Sciatica of left side          DISPOSITION / PLAN     DISPOSITION        PATIENT REFERRED TO:  No follow-up provider specified.     DISCHARGE MEDICATIONS:  New Prescriptions    No medications on file       Sherrie Quiroz DO  Emergency Medicine Resident    (Please note that portions of this note were completed with a voice recognition program.Efforts were made to edit the dictations but occasionally words are mis-transcribed.)       Sherrie Quiorz DO  Resident  10/01/22 [id number]

## 2022-10-01 LAB — GLUCOSE BLD-MCNC: 88 MG/DL (ref 65–105)

## 2022-10-01 PROCEDURE — 6370000000 HC RX 637 (ALT 250 FOR IP): Performed by: STUDENT IN AN ORGANIZED HEALTH CARE EDUCATION/TRAINING PROGRAM

## 2022-10-01 PROCEDURE — 96374 THER/PROPH/DIAG INJ IV PUSH: CPT

## 2022-10-01 PROCEDURE — 6370000000 HC RX 637 (ALT 250 FOR IP)

## 2022-10-01 PROCEDURE — G0378 HOSPITAL OBSERVATION PER HR: HCPCS

## 2022-10-01 PROCEDURE — 96376 TX/PRO/DX INJ SAME DRUG ADON: CPT

## 2022-10-01 PROCEDURE — 96372 THER/PROPH/DIAG INJ SC/IM: CPT

## 2022-10-01 PROCEDURE — 2580000003 HC RX 258: Performed by: STUDENT IN AN ORGANIZED HEALTH CARE EDUCATION/TRAINING PROGRAM

## 2022-10-01 PROCEDURE — 97530 THERAPEUTIC ACTIVITIES: CPT

## 2022-10-01 PROCEDURE — 6360000002 HC RX W HCPCS

## 2022-10-01 PROCEDURE — 82947 ASSAY GLUCOSE BLOOD QUANT: CPT

## 2022-10-01 PROCEDURE — 97162 PT EVAL MOD COMPLEX 30 MIN: CPT

## 2022-10-01 PROCEDURE — 6360000002 HC RX W HCPCS: Performed by: STUDENT IN AN ORGANIZED HEALTH CARE EDUCATION/TRAINING PROGRAM

## 2022-10-01 RX ORDER — DIAZEPAM 2 MG/1
2 TABLET ORAL ONCE
Status: COMPLETED | OUTPATIENT
Start: 2022-10-01 | End: 2022-10-02

## 2022-10-01 RX ORDER — MORPHINE SULFATE 4 MG/ML
4 INJECTION, SOLUTION INTRAMUSCULAR; INTRAVENOUS ONCE
Status: COMPLETED | OUTPATIENT
Start: 2022-10-01 | End: 2022-10-01

## 2022-10-01 RX ORDER — CYCLOBENZAPRINE HCL 10 MG
10 TABLET ORAL 3 TIMES DAILY PRN
Status: DISCONTINUED | OUTPATIENT
Start: 2022-10-01 | End: 2022-10-09 | Stop reason: HOSPADM

## 2022-10-01 RX ORDER — OXYCODONE HYDROCHLORIDE 5 MG/1
10 TABLET ORAL EVERY 4 HOURS PRN
Status: DISCONTINUED | OUTPATIENT
Start: 2022-10-01 | End: 2022-10-09 | Stop reason: HOSPADM

## 2022-10-01 RX ADMIN — PANTOPRAZOLE SODIUM 40 MG: 40 TABLET, DELAYED RELEASE ORAL at 17:30

## 2022-10-01 RX ADMIN — PANTOPRAZOLE SODIUM 40 MG: 40 TABLET, DELAYED RELEASE ORAL at 09:40

## 2022-10-01 RX ADMIN — ORPHENADRINE CITRATE 60 MG: 30 INJECTION INTRAMUSCULAR; INTRAVENOUS at 04:07

## 2022-10-01 RX ADMIN — OXYCODONE 10 MG: 5 TABLET ORAL at 17:30

## 2022-10-01 RX ADMIN — GABAPENTIN 300 MG: 300 CAPSULE ORAL at 14:48

## 2022-10-01 RX ADMIN — BUSPIRONE HYDROCHLORIDE 10 MG: 10 TABLET ORAL at 14:48

## 2022-10-01 RX ADMIN — SERTRALINE 50 MG: 50 TABLET, FILM COATED ORAL at 09:40

## 2022-10-01 RX ADMIN — GABAPENTIN 300 MG: 300 CAPSULE ORAL at 07:58

## 2022-10-01 RX ADMIN — CYCLOBENZAPRINE 10 MG: 10 TABLET, FILM COATED ORAL at 21:37

## 2022-10-01 RX ADMIN — CYCLOBENZAPRINE 10 MG: 10 TABLET, FILM COATED ORAL at 12:04

## 2022-10-01 RX ADMIN — ACETAMINOPHEN 1000 MG: 500 TABLET ORAL at 14:48

## 2022-10-01 RX ADMIN — OXYCODONE 10 MG: 5 TABLET ORAL at 21:37

## 2022-10-01 RX ADMIN — ACETAMINOPHEN 1000 MG: 500 TABLET ORAL at 04:07

## 2022-10-01 RX ADMIN — GABAPENTIN 300 MG: 300 CAPSULE ORAL at 19:55

## 2022-10-01 RX ADMIN — OXYCODONE 5 MG: 5 TABLET ORAL at 04:07

## 2022-10-01 RX ADMIN — MORPHINE SULFATE 4 MG: 4 INJECTION INTRAVENOUS at 15:07

## 2022-10-01 RX ADMIN — LISINOPRIL 40 MG: 20 TABLET ORAL at 09:40

## 2022-10-01 RX ADMIN — SODIUM CHLORIDE, PRESERVATIVE FREE 10 ML: 5 INJECTION INTRAVENOUS at 20:01

## 2022-10-01 RX ADMIN — MORPHINE SULFATE 4 MG: 4 INJECTION INTRAVENOUS at 08:35

## 2022-10-01 RX ADMIN — GLIPIZIDE 10 MG: 10 TABLET ORAL at 17:30

## 2022-10-01 RX ADMIN — MAGNESIUM GLUCONATE 500 MG ORAL TABLET 400 MG: 500 TABLET ORAL at 09:41

## 2022-10-01 RX ADMIN — BUSPIRONE HYDROCHLORIDE 10 MG: 10 TABLET ORAL at 19:56

## 2022-10-01 RX ADMIN — DESMOPRESSIN ACETATE 40 MG: 0.2 TABLET ORAL at 19:56

## 2022-10-01 RX ADMIN — ACETAMINOPHEN 1000 MG: 500 TABLET ORAL at 21:36

## 2022-10-01 RX ADMIN — CELECOXIB 100 MG: 100 CAPSULE ORAL at 19:56

## 2022-10-01 RX ADMIN — ROPINIROLE HYDROCHLORIDE 2 MG: 1 TABLET, FILM COATED ORAL at 19:55

## 2022-10-01 RX ADMIN — OXYCODONE 5 MG: 5 TABLET ORAL at 07:58

## 2022-10-01 RX ADMIN — SODIUM CHLORIDE, PRESERVATIVE FREE 10 ML: 5 INJECTION INTRAVENOUS at 09:43

## 2022-10-01 RX ADMIN — GLIPIZIDE 10 MG: 10 TABLET ORAL at 09:41

## 2022-10-01 RX ADMIN — CELECOXIB 100 MG: 100 CAPSULE ORAL at 09:41

## 2022-10-01 RX ADMIN — OXYCODONE 5 MG: 5 TABLET ORAL at 12:04

## 2022-10-01 RX ADMIN — BUSPIRONE HYDROCHLORIDE 10 MG: 10 TABLET ORAL at 09:41

## 2022-10-01 ASSESSMENT — PAIN - FUNCTIONAL ASSESSMENT: PAIN_FUNCTIONAL_ASSESSMENT: PREVENTS OR INTERFERES SOME ACTIVE ACTIVITIES AND ADLS

## 2022-10-01 ASSESSMENT — PAIN SCALES - GENERAL
PAINLEVEL_OUTOF10: 9
PAINLEVEL_OUTOF10: 9
PAINLEVEL_OUTOF10: 10
PAINLEVEL_OUTOF10: 9
PAINLEVEL_OUTOF10: 9
PAINLEVEL_OUTOF10: 8

## 2022-10-01 ASSESSMENT — PAIN DESCRIPTION - DESCRIPTORS
DESCRIPTORS: ACHING;DISCOMFORT
DESCRIPTORS: STABBING

## 2022-10-01 ASSESSMENT — PAIN DESCRIPTION - DIRECTION: RADIATING_TOWARDS: LEFT FOOT

## 2022-10-01 ASSESSMENT — PAIN DESCRIPTION - LOCATION
LOCATION: BACK;HIP;LEG
LOCATION: HIP

## 2022-10-01 ASSESSMENT — PAIN DESCRIPTION - ORIENTATION
ORIENTATION: LEFT
ORIENTATION: LEFT

## 2022-10-01 ASSESSMENT — PAIN DESCRIPTION - FREQUENCY: FREQUENCY: CONTINUOUS

## 2022-10-01 ASSESSMENT — PAIN DESCRIPTION - PAIN TYPE: TYPE: ACUTE PAIN;CHRONIC PAIN

## 2022-10-01 NOTE — PROGRESS NOTES
[facility]  CDU / OBSERVATION ENCOUNTER  ATTENDING NOTE       I performed a history and physical examination of the patient and discussed management with the resident or midlevel provider. I reviewed the resident or midlevel provider's note and agree with the documented findings and plan of care. Any areas of disagreement are noted on the chart. I was personally present for the key portions of any procedures. I have documented in the chart those procedures where I was not present during the key portions. I have reviewed the nurses notes. I agree with the chief complaint, past medical history, past surgical history, allergies, medications, social and family history as documented unless otherwise noted below. The Family history, social history, and ROS are effectively unchanged since admission unless noted elsewhere in the chart. Eli Gallardo MD [facility]  Attending Emergency  Physician      Noted to the emergency department with complaints of severe low back pain for the past several days which was initiated when she attempted to turn over in bed. Denies any fall or any direct trauma to the back. She reporting pain radiating down the left lower extremity but without paresthesias or weakness. No abdominal pain. No fevers or chills. No disturbance of bowel or bladder function. Medical history is positive for low back pain. [place] the patient is awake and alert. She is cooperative and responsive. Examination of the low back reveals tenderness in the left low lumbar paraspinal region but also over the left piriformis muscle region. Extremities are symmetric and normal for strength and sensation. Clear to auscultation and percussion bilaterally. No CVA tenderness. Cardiac exam demonstrates an S1-S2, regular rate and rhythm. No murmurs, rubs, gallop. Abdomen is soft, nondistended, nontender with normal bowel sounds.   Impression: Left sciatica with possible piriformis syndrome. Plan: We will continue to treat symptomatically but also consult neurosurgery.

## 2022-10-01 NOTE — CONSULTS
Sarika  22.    Department of Neurosurgery  Resident Consult Note      Reason for Consult: Left lower back pain radiating to left lower extremity, history of sciatica  Requesting Physician:  hospitalist  Neurosurgeon:   []Dr. Gabriela Brooks  []Dr. Alexsandra Faustin  []Dr. Meredith Bonner  []Dr. Dieudonne Castorena  []Dr. Peggy Meredith  []Dr. Alfreda Qeuvedo    History Obtained From:  patient    CHIEF COMPLAINT:         Left lower back pain radiating to left lower extremity, history of sciatica    HISTORY OF PRESENT ILLNESS:       58-year-old female presented with complaint of left-sided back pain radiating to left leg. As per patient, she had left lower back pain started in June 2021 and had multiple exacerbations with few ED visits. She was admitted into pain in May 2022. She follows with Dr. Meredith Bonner  for ulnar neuropathy and left lower radiculopathy and is being worked up for ulnar nerve surgery as she was told that it is for acute. She has been doing regular PT and aquatic therapy and has been getting strength back. She states that she may have overdone it last week working at her [employer] but denies any heavy lifting. Yesterday she complained of sudden onset severe sharp left lumbar pain 8/10 intensity radiating down to her leg up till ankle. Neurosurgery has been consulted for evaluation of acute left lower back pain. On my evaluation, patient was awake alert and oriented without any cranial or visual or motor deficit in the bilateral upper extremity no sensory deficit noticed. No motor or sensory deficit noticed in the right lower extremity. She was in moderate pain which was worsened by significant change in posture the back. Straight leg raise test positive on the left side. Strength 3+/5. Poor effort due to complaint of pain. Decreased sensation in the left lower extremity. Plan to get MRI lumbar spine without contrast for evaluation.     Of note, patient was recently seen by neurosurgery on 9/22 by  Salina Najjar for bilateral ulnar neuropathy and being worked up for upcoming surgery. Previous MRI lumbar spine without contrast showed:    No significant change compared to MR lumbar spine from 2022. Disc bulging at L5-S1 with asymmetric encroachment on the left lateral recess   and abutment of the transversing left S1 nerve root. PAST MEDICAL HISTORY :       Past Medical History:        Diagnosis Date    Cervical spondylosis 2009    c-4 c-5, c-5 c-6, c-6 c-7    Generalized anxiety disorder 2020    Hyperlipidemia     Hypertension     Lumbar radiculopathy     DIEGO (nonalcoholic steatohepatitis) 10/12/2014    Obesity     Osteoarthritis of left knee 2014    Recurrent major depressive disorder ([facility] [address] 07/15/2022    Restless legs syndrome     TIA (transient ischemic attack)     no residual symptoms    Type II or unspecified type diabetes mellitus without mention of complication, not stated as uncontrolled        Past Surgical History:        Procedure Laterality Date     SECTION      COLONOSCOPY  2012    [facility]    HYSTERECTOMY, TOTAL ABDOMINAL (CERVIX REMOVED)      Polymenorrhagia    SHOULDER SURGERY Left 2021    SHOULDER MANIPULATION WITH PRE OP INTERSCALENE BLOCK and intraop injection performed by Denny Gonzalez DO at [facility]  2012    [facility]       Social History:   Social History     Socioeconomic History    Marital status:      Spouse name: Not on file    Number of children: Not on file    Years of education: Not on file    Highest education level: Not on file   Occupational History    Not on file   Tobacco Use    Smoking status: Never    Smokeless tobacco: Never   Vaping Use    Vaping Use: Never used   Substance and Sexual Activity    Alcohol use:  Yes     Alcohol/week: 0.0 standard drinks     Comment: rarely/ 4 times a year    Drug use: No    Sexual activity: Yes   Other Topics Concern    Not on file   Social History Narrative    Not on file     Social Determinants of Health     Financial Resource Strain: Low Risk     Difficulty of Paying Living Expenses: Not hard at all   Food Insecurity: No Food Insecurity    Worried About [address] in the Last Year: Never true    [address] in the Last Year: Never true   Transportation Needs: No Transportation Needs    Lack of Transportation (Medical): No    Lack of Transportation (Non-Medical): No   Physical Activity: Unknown    Days of Exercise per Week: 0 days    Minutes of Exercise per Session: Not on file   Stress: Stress Concern Present    Feeling of Stress : To some extent   Social Connections: Socially Integrated    Frequency of Communication with Friends and Family: More than three times a week    Frequency of Social Gatherings with Friends and Family: Once a week    Attends [employer] Services: More than 4 times per year    Active Member of [employer] or Organizations: Yes    Attends Club or Organization Meetings: More than 4 times per year    Marital Status:    Intimate Partner Violence: Not At Risk    Fear of Current or Ex-Partner: No    Emotionally Abused: No    Physically Abused: No    Sexually Abused: No   Housing Stability: Low Risk     Unable to Pay for Housing in the Last Year: No    Number of [place] in the Last Year: 1    Unstable Housing in the Last Year: No       Family History:       Problem Relation Age of Onset    Heart Attack Mother     Diabetes Mother     Diabetes Father     Heart Attack Father        Allergies:   Codeine    Home Medications:  Prior to Admission medications    Medication Sig Start Date End Date Taking?  Authorizing Provider   sertraline (ZOLOFT) 50 MG tablet TAKE ONE TABLET BY MOUTH EVERY MORNING 9/21/22   Ashwini Booth MD   TRULICITY 1.5 CQ/2.5BI [facility] INJECT THE CONTENTS OF ONE SYRINGE UNDER THE SKIN ONCE WEEKLY 9/21/22   Ashwini Booth MD   celecoxib (CELEBREX) 100 MG capsule TAKE 1 CAPSULE BY MOUTH 2 TIMES A DAY 9/8/22   Zamzam Madrid MD   SYNJARDY 5-1000 MG TABS TAKE 1 TABLET BY MOUTH 2 TIMES A DAY 8/30/22   Estela Tellez MD   lisinopril (PRINIVIL;ZESTRIL) 40 MG tablet Take 1 tablet by mouth daily 8/25/22   Mouna Mcgee MD   busPIRone (BUSPAR) 10 MG tablet TAKE 1 TABLET BY MOUTH 3 TIMES A DAY 8/23/22   Sangeeta Gonzalez MD   Elastic Bandages & Supports (ELBOW BRACE) MISC 1 each by Does not apply route at bedtime Please dispense left cubital tunnel brace 8/18/22 11/16/22  Lorenda Hazard, APRN - CNP   ASPIRIN LOW DOSE 81 MG EC tablet TAKE 1 TABLET BY MOUTH ONE TIME A DAY 8/10/22   Estela Tellez MD   alendronate (FOSAMAX) 70 MG tablet Take 1 tablet by mouth every 7 days 7/29/22   Jj Arias MD   oxyCODONE-acetaminophen (PERCOCET) 5-325 MG per tablet Take by mouth every 4 hours as needed for Pain. Historical Provider, MD   atorvastatin (LIPITOR) 40 MG tablet Take 1 tablet by mouth at bedtime 7/15/22   Anastacia Marroquin MD   rOPINIRole (REQUIP) 1 MG tablet Take 2 tablets by mouth nightly 7/15/22   Anastacia Marroquin MD   acetaminophen (TYLENOL 8 HOUR) 650 MG extended release tablet Take 1 tablet by mouth every 8 hours as needed for Pain 7/15/22   Stacie Gayle MD   miconazole (ZEASORB-AF) 2 % powder Apply 43 g topically as needed for Itching Apply topically 2 times daily. 7/15/22   Anastacia Marroquin MD   furosemide (LASIX) 20 MG tablet Take 0.5 tablets by mouth every other day 7/15/22   Stacie Gayle MD   diclofenac sodium (VOLTAREN) 1 % GEL Apply 2 g topically in the morning and 2 g at noon and 2 g in the evening and 2 g before bedtime. 7/15/22   Anastacia Marroquin MD   blood glucose test strips (PRODIGY NO CODING BLOOD GLUC) strip Use to test once daily and as needed as directed by provider. Please dispense Prodigy brand.  7/12/22   Estela Tellez MD   blood glucose monitor kit and supplies Dispense sufficient amount for indicated testing frequency plus additional to accommodate PRN testing needs. Dispense all needed supplies to include: monitor, strips, lancing device, lancets, control solutions, alcohol swabs. Prodigy Brand 7/6/22   Jennifer Couch MD   Magnesium Oxide 400 MG CAPS Take 400 mg by mouth daily 7/6/22   Mo'Men Toro Cast MD   gabapentin (NEURONTIN) 300 MG capsule Take 1 capsule by mouth 3 times daily for 30 days.  6/9/22 7/9/22  Cindy Cast MD   glipiZIDE (GLUCOTROL) 10 MG tablet Take 1 tab bid 1/13/22   Jennifer Couch MD   omeprazole (PRILOSEC) 20 MG delayed release capsule Take 1 capsule by mouth 2 times daily 7/26/21   Jennifer Couch MD   albuterol sulfate HFA (PROVENTIL HFA) 108 (90 Base) MCG/ACT inhaler Inhale 2 puffs into the lungs every 4 hours as needed for Wheezing 4/20/21 4/20/22  Jennifer Couch MD   repaglinide (PRANDIN) 1 MG tablet TAKE 1 TABLET BY MOUTH 3 TIMES A DAY BEFORE MEALS 12/8/20   Jennifer Couch MD   bimatoprost (LUMIGAN) 0.01 % SOLN ophthalmic drops Place 1 drop into both eyes nightly 4/26/18   Historical Provider, MD   PRODIGY LANCETS 28G MISC 1 Bottle by Does not apply route three times daily 4/26/19   Jennifer Couch MD   Blood Glucose Monitoring Suppl (PRODIGY AUTOCODE BLOOD GLUCOSE) w/Device KIT 1 Device by Does not apply route 3 times daily 4/26/19   Jennifer Couch MD       Current Medications:   Current Facility-Administered Medications: cyclobenzaprine (FLEXERIL) tablet 10 mg, 10 mg, Oral, TID PRN  Empagliflozin-metFORMIN HCl 5-1000 MG TABS 1 tablet (Patient Supplied), 1 tablet, Oral, BID  diazePAM (VALIUM) tablet 2 mg, 2 mg, Oral, Once  lidocaine 4 % external patch 1 patch, 1 patch, TransDERmal, Daily  atorvastatin (LIPITOR) tablet 40 mg, 40 mg, Oral, Nightly  busPIRone (BUSPAR) tablet 10 mg, 10 mg, Oral, TID  celecoxib (CELEBREX) capsule 100 mg, 100 mg, Oral, BID  furosemide (LASIX) tablet 10 mg, 10 mg, Oral, Every Other Day  gabapentin (NEURONTIN) capsule 300 mg, 300 mg, Oral, TID  glipiZIDE (GLUCOTROL) tablet 10 mg, 10 mg, Oral, BID AC  lisinopril (PRINIVIL;ZESTRIL) tablet 40 mg, 40 mg, Oral, Daily  magnesium oxide (MAG-OX) tablet 400 mg, 400 mg, Oral, Daily  pantoprazole (PROTONIX) tablet 40 mg, 40 mg, Oral, BID AC  rOPINIRole (REQUIP) tablet 2 mg, 2 mg, Oral, Nightly  sertraline (ZOLOFT) tablet 50 mg, 50 mg, Oral, Daily  sodium chloride flush 0.9 % injection 5-40 mL, 5-40 mL, IntraVENous, 2 times per day  sodium chloride flush 0.9 % injection 5-40 mL, 5-40 mL, IntraVENous, PRN  0.9 % sodium chloride infusion, , IntraVENous, PRN  ondansetron (ZOFRAN-ODT) disintegrating tablet 4 mg, 4 mg, Oral, Q8H PRN **OR** ondansetron (ZOFRAN) injection 4 mg, 4 mg, IntraVENous, Q6H PRN  polyethylene glycol (GLYCOLAX) packet 17 g, 17 g, Oral, Daily PRN  acetaminophen (TYLENOL) tablet 1,000 mg, 1,000 mg, Oral, 3 times per day  oxyCODONE (ROXICODONE) immediate release tablet 5 mg, 5 mg, Oral, Q4H PRN    REVIEW OF SYSTEMS:       General ROS - No fevers, no chills  Ophthalmic ROS - No changes in vision from baseline  ENT ROS -  No sore throat, no rhinorrhea  Respiratory ROS - no cough, no shortness of breath  Cardiovascular ROS - No chest pain  Gastrointestinal ROS - No abdominal pain, no nausea, no vomiting  Genito-Urinary ROS - No dysuria  Musculoskeletal ROS - No neck pain, left lower back pain  Neurological ROS - No focal weakness or loss of sensation, no headache except left lower extremity weakness and numbness  Dermatological ROS - No lesions, no rash  Vascular/Lymphatic ROS - No edema    PHYSICAL EXAM:       Vitals:    10/01/22 0754   BP: (!) 153/81   Pulse: 83   Resp: 18   Temp: 98.4 °F (36.9 °C)   SpO2: 93%       CONSTITUTIONAL:  Well developed, well nourished, alert and oriented x 3, in no acute distress, nontoxic. No dysarthria, no aphasia. EOMI.     HEAD:  normocephalic, atraumatic    EYES:  PERRLA, EOMI   ENT:  moist mucous membranes, no stridor, no tracheal deviation   NECK:  no midline tenderness to palpation, no step-offs or deformities   BACK: no midline tenderness to palpation, no step-offs or deformities    LUNGS:  CTAB, equal air entry bilaterally, no wheezes or rales   CARDIOVASCULAR:  RRR, no murmur   ABDOMEN:  Soft, no rigidity   NEUROLOGIC:  EYE OPENING     Spontaneous - 4 [x]       To voice - 3 []       To pain - 2 []       None - 1 []    VERBAL RESPONSE     Appropriate, oriented - 5 [x]       Dazed or confused - 4 []       Syllables, expletives - 3 []       Grunts - 2 []       None - 1 []    MOTOR RESPONSE     Spontaneous, command - 6 [x]       Localizes pain - 5 []       Withdraws pain - 4 []       Abnormal flexion - 3 []       Abnormal extension - 2 []       None - 1 []            Total GCS: 15    Mental Status:  A & O x3, awake             Cranial Nerves:    cranial nerves II-XII are grossly intact    Motor Exam:    Drift:  absent  Tone:  normal    Motor exam is 5 out of 5 all extremities with the exception of LUE 3+/5 (poor effort due to pain)    Sensory:    Touch:    Right Upper Extremity:  normal  Left Upper Extremity:  normal  Right Lower Extremity:  normal  Left Lower Extremity:  abnormal - decrease pinprick and gross touch sensation. Deep Tendon Reflexes:    Right Bicep:  1+  Left Bicep:  1+  Right Knee:  2+  Left Knee:  2+    Plantar Response:    Right:  downgoing  Left:  downgoing    Clonus:  present  Boyer's:  absent    Coordination/Dysmetria:  Heel to Shin:  Right:  normal  Left:  unable to perform due to weakness  Finger to Nose:   Right:  normal  Left:  normal     Gait:  deffered. Striaght leg raise test positive on the left side.    SKIN:  no rash      LABS AND IMAGING:     Labs:  CBC with Differential:    Lab Results   Component Value Date/Time    WBC 10.9 06/13/2022 05:50 AM    RBC 3.59 06/13/2022 05:50 AM    HGB 10.3 06/13/2022 05:50 AM    HCT 33.7 06/13/2022 05:50 AM     06/13/2022 05:50 AM    MCV 93.9 06/13/2022 05:50 AM    MCH 28.7 06/13/2022 05:50 AM    MCHC 30.6 06/13/2022 05:50 AM    RDW 13.4 06/13/2022 05:50 AM    LYMPHOPCT 25 06/13/2022 05:50 AM    MONOPCT 8 06/13/2022 05:50 AM    BASOPCT 1 06/13/2022 05:50 AM    MONOSABS 0.87 06/13/2022 05:50 AM    LYMPHSABS 2.71 06/13/2022 05:50 AM    EOSABS 0.67 06/13/2022 05:50 AM    BASOSABS 0.07 06/13/2022 05:50 AM    DIFFTYPE NOT REPORTED 07/21/2021 10:15 AM     BMP:    Lab Results   Component Value Date/Time     07/06/2022 03:40 PM    K 4.6 07/06/2022 03:40 PM     07/06/2022 03:40 PM    CO2 25 07/06/2022 03:40 PM    BUN 15 07/06/2022 03:40 PM    LABALBU 4.5 05/31/2022 04:54 PM    LABALBU 4.1 05/19/2012 12:05 PM    CREATININE 1.00 07/06/2022 03:40 PM    CALCIUM 9.3 07/06/2022 03:40 PM    GFRAA >60 07/06/2022 03:40 PM    LABGLOM 56 07/06/2022 03:40 PM    GLUCOSE 114 07/06/2022 03:40 PM    GLUCOSE 137 05/19/2012 12:05 PM       Radiology Review:      No results found.       ASSESSMENT AND PLAN:       Patient Active Problem List   Diagnosis    Cervical spondylosis    Type 2 diabetes mellitus without complication, without long-term current use of insulin ([facility])    Hypertension    Abnormal auditory perception    Eustachian tube dysfunction    Chronic serous otitis media    Nasal polyps    Nasal congestion    Osteoarthritis of left knee    Osteoarthritis of right knee    DIEGO (nonalcoholic steatohepatitis)    Vitamin D deficiency    Iron deficiency anemia    Dyslipidemia    Diabetes mellitus type 2, uncontrolled    Left hip pain    Trochanteric bursitis    Fatigue    Daytime somnolence    Snoring    Chronic left shoulder pain    Restless legs syndrome    Generalized anxiety disorder    Primary osteoarthritis, left shoulder    Tendinopathy of left shoulder    Adhesive capsulitis of left shoulder    Lumbar radiculopathy    Back pain    Toxic metabolic encephalopathy    Class 2 obesity in adult    Ulnar neuropathy at elbow, right    Leg edema    Recurrent major depressive disorder (HCC)    Dermatitis    Retrolisthesis of vertebrae    Bilateral carpal tunnel syndrome Intention tremor    Sciatica       A/P:  Ayan Sanchez is a 65 y.o. female who presents with left lower back pain radiating to left lower extremity. History disc bulge at L5-S1 with concern for encroachment at S1 nerve root. Patient care will be discussed with attending, will reevaluate patient along with attending     - No neurosurgical interventions planned for now. - Repeat MRI lumbar spine without contrast  - Avoid lifting heavy objects. Additional recommendations may follow    Please contact neurosurgery with any changes in patient's neurologic status. Thank you for your consult.        Quentin Hall MD    PGY-2 Emergency Medicine Resident Physician  Neurosurgery Team - pager [phone]  10/1/2022 2:33 PM

## 2022-10-01 NOTE — PROGRESS NOTES
Physical Therapy  Facility/Department: [address] OBSERVATION  Physical Therapy Initial Assessment    Name: Benito Powell  : 1960  MRN: [id number]  Date of Service: 10/1/2022  Chief Complaint   Patient presents with    Back Pain     \"Sciatica back pain\"       Discharge Recommendations:  Patient would benefit from continued therapy after discharge      Patient Diagnosis(es): The encounter diagnosis was Sciatica of left side. Past Medical History:  has a past medical history of Cervical spondylosis, Generalized anxiety disorder, Hyperlipidemia, Hypertension, Lumbar radiculopathy, DIEGO (nonalcoholic steatohepatitis), Obesity, Osteoarthritis of left knee, Recurrent major depressive disorder ([facility] [address] Restless legs syndrome, TIA (transient ischemic attack), and Type II or unspecified type diabetes mellitus without mention of complication, not stated as uncontrolled. Past Surgical History:  has a past surgical history that includes Upper gastrointestinal endoscopy (2012); Colonoscopy (2012);  section; Hysterectomy, total abdominal (); and shoulder surgery (Left, 2021). Assessment   Body Structures, Functions, Activity Limitations Requiring Skilled Therapeutic Intervention: Decreased functional mobility ; Decreased strength;Decreased endurance  Assessment: The pt ambulated 25 ft with a RW x CGA. She reported increased pain in her L LE with mobilization. She had been in aquatic therapy in  the past and may benefit from it again.  Will continue with gait and strengthening  Therapy Prognosis: Good  Decision Making: Medium Complexity  Requires PT Follow-Up: Yes  Activity Tolerance  Activity Tolerance: Patient limited by fatigue;Patient limited by endurance     Plan   Physcial Therapy Plan  General Plan:  (5-6x wk)  Current Treatment Recommendations: Strengthening, Functional mobility training, Transfer training, Stair training, Gait training, Safety education & training  Safety Devices  Type of Devices: Nurse notified, Left in bed, Call light within reach     Restrictions  Restrictions/Precautions  Restrictions/Precautions: Up as Tolerated     Subjective      Social/Functional History  Social/Functional History  Lives With: Spouse  Type of Home: Apartment  Home Layout: One level (2nd fl apt)  Home Access: Stairs to enter with rails  Entrance Stairs - Number of Steps: 21  Entrance Stairs - Rails: Right  Bathroom Shower/Tub: Tub/Shower unit  Bathroom Toilet: Standard  Home Equipment: Mae Lanius, rolling (Walker)  ADL Assistance: [address]: Independent  Homemaking Responsibilities: Yes  Ambulation Assistance: Independent  Transfer Assistance: Independent  Active : Yes  Mode of Transportation: Car  Occupation: Unemployed  Vision/Hearing  Vision  Vision: Within Functional Limits  Hearing  Hearing: Within functional limits    Cognition   Orientation  Overall Orientation Status: Within Functional Limits  Cognition  Overall Cognitive Status: WFL     Objective   Heart Rate: 83  Heart Rate Source: Monitor  BP:   BP Location: Right upper arm  BP Method: Automatic  Patient Position: Semi fowlers  MAP (Calculated): 105  Resp: 18  SpO2: 93 %  O2 Device: None (Room air)     AROM RLE (degrees)  RLE AROM: WNL  AROM LLE (degrees)  LLE AROM : WFL  AROM RUE (degrees)  RUE AROM : WFL  AROM LUE (degrees)  LUE AROM : WFL  Strength RLE  Strength RLE: WFL  Strength LLE  Comment: N/T  Strength RUE  Strength RUE: WFL  Strength LUE  Strength LUE: WFL     Bed mobility  Supine to Sit: Contact guard assistance  Sit to Supine: Contact guard assistance  Scooting: Contact guard assistance  Transfers  Sit to Stand: Contact guard assistance  Stand to Sit: Contact guard assistance  Ambulation  Surface: Level tile  Device: Rolling Walker  Assistance: Contact guard assistance  Distance: amb 25 ft with a RW x CGA     Balance  Posture: Good  Sitting - Static: Good  Sitting - Dynamic: Fair  Standing - Static: Good  Standing - Dynamic: Fair     OutComes Score     AM-PAC Score  AM-PAC Inpatient Mobility Raw Score : 19 (10/01/22 1059)  AM-PAC Inpatient T-Scale Score : 45.44 (10/01/22 1059)  Mobility Inpatient CMS 0-100% Score: 41.77 (10/01/22 1059)  Mobility Inpatient CMS G-Code Modifier : CK (10/01/22 1059)     Tinneti Score     Goals  Short Term Goals  Time Frame for Short Term Goals: 10 visits  Short Term Goal 1: transfers with SBA  Short Term Goal 2: amb 150 ft with a RW x SBA  Short Term Goal 3: ascend/descend 4 steps with SBA  Short Term Goal 4: 20 min strengthening exercise program x SBA     Education  Patient Education  Education Given To: Patient  Education Provided: Role of Therapy;Plan of Care  Education Method: Verbal  Barriers to Learning: None  Education Outcome: Verbalized understanding    Therapy Time   Individual Concurrent Group Co-treatment   Time In 0950         Time Out 1016         Minutes 26             1 of [facility], PT

## 2022-10-01 NOTE — CARE COORDINATION
10/01/22 0944   Service Assessment   Patient Orientation Alert and Oriented   History Provided By Patient   PCP Verified by CM Yes  Amrita Leija MD)   Last Visit to PCP Within last 3 months   Current Functional Level Independent in ADLs/IADLs   Social/Functional History   Lives With [facility]   Home Equipment   Clark Gaffney)   Discharge Planning   Potential Assistance Purchasing Medications No   Patient expects to be discharged to: [address] Discharge   Confirm Follow Up Transport Family       Plan return home with family

## 2022-10-01 NOTE — ED NOTES
The following labs were labeled with appropriate pt sticker and tubed to lab:     [] Blue     [] Lavender   [] on ice  [] Green/yellow  [] Green/black [] on ice  [] Ryland Crate  [] on ice  [] Yellow  [] Red  [] Pink  [] ABG  [] VBG    [x] COVID-19 swab    [x] Rapid  [] PCR  [] Flu swab  [] Peds Viral Panel     [] Urine Sample  [] Pelvic Cultures  [] Blood Cultures  [] X 2  [] STREP Cultures         Chi Tijerina RN  09/30/22 2013

## 2022-10-01 NOTE — H&P
[facility]  CDU / OBSERVATION eNCOUnter  Resident Note     Pt Name: Lawson Tinajero  MRN: [id number]  [place] 1960  Date of evaluation: 10/1/22  Patient's PCP is :  Sidney Cool MD    CHIEF COMPLAINT       Chief Complaint   Patient presents with    Back Pain     \"Sciatica back pain\"          HISTORY OF PRESENT ILLNESS    Lawson Tinajero is a 65 y.o. female who presents with left-sided back pain. Patient has extensive history related to sciatica pain. She was admitted for 4 days due to this pain in May 2022. Underwent imaging that showed bulging disc but no other acute injury. Patient has been in regular physical therapy including aquatic therapy since that time. She was doing very well and gaining strength back. Patient may have overdone it last week while working at [employer]. She did not do any heavy lifting but when she rolled over in bed this morning, she had sudden onset of severe pain in the left lower back, radiating through the left buttock into the left leg. Location/Symptom: Left lower back  Timing/Onset: Yesterday morning  Provocation: Rolling out of bed, unknown per patient   Quality: Sharp radiating  Radiation: Left lower extremity  Severity: Acute  Timing/Duration: Continuous  Modifying Factors:  Movement exacerbates pain    REVIEW OF SYSTEMS       General ROS - No fevers, No malaise   Ophthalmic ROS - No discharge, No changes in vision  ENT ROS -  No sore throat, No rhinorrhea,   Respiratory ROS - no shortness of breath, no cough, no  wheezing  Cardiovascular ROS - No chest pain, no dyspnea on exertion  Gastrointestinal ROS - No abdominal pain, no nausea or vomiting, no change in bowel habits, no black or bloody stools  Genito-Urinary ROS - No dysuria, trouble voiding, or hematuria  Musculoskeletal ROS -left lower extremity pain radiating from left gluteal region down to knee  Neurological ROS - No headache, no dizziness/lightheadedness, No focal weakness, no loss of sensation  Dermatological ROS - No lesions, No rash     (PQRS) Advance directives on face sheet per hospital policy. No change unless specifically mentioned in chart    [address]    has a past medical history of Cervical spondylosis, Generalized anxiety disorder, Hyperlipidemia, Hypertension, Lumbar radiculopathy, DIEGO (nonalcoholic steatohepatitis), Obesity, Osteoarthritis of left knee, Recurrent major depressive disorder ([address] Restless legs syndrome, TIA (transient ischemic attack), and Type II or unspecified type diabetes mellitus without mention of complication, not stated as uncontrolled. I have reviewed the past medical history with the patient and it is pertinent to this complaint. SURGICAL HISTORY      has a past surgical history that includes Upper gastrointestinal endoscopy (2012); Colonoscopy (2012);  section; Hysterectomy, total abdominal (); and shoulder surgery (Left, 2021). I have reviewed and agree with Surgical History entered and it is pertinent to this complaint.      CURRENT MEDICATIONS     lidocaine 4 % external patch 1 patch, Daily  atorvastatin (LIPITOR) tablet 40 mg, Nightly  busPIRone (BUSPAR) tablet 10 mg, TID  celecoxib (CELEBREX) capsule 100 mg, BID  furosemide (LASIX) tablet 10 mg, Every Other Day  gabapentin (NEURONTIN) capsule 300 mg, TID  glipiZIDE (GLUCOTROL) tablet 10 mg, BID AC  lisinopril (PRINIVIL;ZESTRIL) tablet 40 mg, Daily  magnesium oxide (MAG-OX) tablet 400 mg, Daily  pantoprazole (PROTONIX) tablet 40 mg, BID AC  rOPINIRole (REQUIP) tablet 2 mg, Nightly  sertraline (ZOLOFT) tablet 50 mg, Daily  sodium chloride flush 0.9 % injection 5-40 mL, 2 times per day  sodium chloride flush 0.9 % injection 5-40 mL, PRN  0.9 % sodium chloride infusion, PRN  ondansetron (ZOFRAN-ODT) disintegrating tablet 4 mg, Q8H PRN   Or  ondansetron (ZOFRAN) injection 4 mg, Q6H PRN  polyethylene glycol (GLYCOLAX) packet 17 g, Daily PRN  acetaminophen (TYLENOL) tablet 1,000 mg, 3 times per day  oxyCODONE (ROXICODONE) immediate release tablet 5 mg, Q4H PRN  orphenadrine (NORFLEX) injection 60 mg, Q12H        All medication charted and reviewed. ALLERGIES     is allergic to codeine. FAMILY HISTORY     She indicated that her mother is . She indicated that her father is . family history includes Diabetes in her father and mother; Heart Attack in her father and mother. The patient denies any pertinent family history. I have reviewed and agree with the family history entered. I have reviewed the Family History and it is not significant to the case    SOCIAL HISTORY      reports that she has never smoked. She has never used smokeless tobacco. She reports current alcohol use. She reports that she does not use drugs. I have reviewed and agree with all Social.  There are no concerns for substance abuse/use. PHYSICAL EXAM     INITIAL VITALS:  height is 5' 3\" (1.6 m) and weight is 190 lb 14.7 oz (86.6 kg). Her oral temperature is 98.4 °F (36.9 °C). Her blood pressure is 153/81 (abnormal) and her pulse is 83. Her respiration is 18 and oxygen saturation is 93%.       CONSTITUTIONAL: AOx4, no apparent distress, appears stated age    HEAD: normocephalic, atraumatic   EYES: PERRLA, EOMI    ENT: moist mucous membranes, uvula midline   NECK: supple, symmetric   BACK: symmetric   LUNGS: clear to auscultation bilaterally   CARDIOVASCULAR: regular rate and rhythm, no murmurs, rubs or gallops   ABDOMEN: soft, non-tender, non-distended with normal active bowel sounds   NEUROLOGIC:  MAEx4, no focal sensory or motor deficits   MUSCULOSKELETAL: no clubbing, cyanosis or edema, normal range of motion, pain with manipulation of left lower extremity   SKIN: no rash or wounds       DIFFERENTIAL DIAGNOSIS/MDM:     Leg pain:  DDX: Trauma, knee fracture, ACL tear, collaterals, meniscus, ankle injury, DVT, abscess/ cellulitis, septic joint, gout, arthritis, patellar dislocation    DIAGNOSTIC RESULTS     RADIOLOGY:   I directly visualized the following  images and reviewed the radiologist interpretations:    No results found. LABS:  I have reviewed and interpreted all available lab results. Labs Reviewed   COVID-19, RAPID       SCREENING TOOLS:    HEART Risk Score for Chest Pain Patients  History and Physical Exam Suspicion Level  (Nausea, Vomiting, Diaphoresis, Radiation, Exertion)  Slightly Suspicious (0 pts)  Moderately Suspicious (1 pt)  Highly Suspicious (2 pts)  EKG Interpretation  Normal (0 pts)  Non-Specific Repolarization Disturbance (1 pt)  Significant ST-Depression (2 pts)  Age of Patient (in years)  = 39 (0 pts)  46-64 (1 pt)  = 65 (2 pts)  Risk Factors  No Risk Factors (0 pts)  1-2 Risk Factors (1 pt)  = 3 Risk Factors (2 pts)  Risk Factors Include:  Hypercholesterolemia  Hypertension  Diabetes Mellitus  Cigarette smoking  Positive family history  Obesity  CAD  (SLE, CKDz, HIV, Cocaine abuse)  Troponin Levels  = Normal Limit (0 pts)  1-3 Times Normal Limit (1 pt)  > 3 Times Normal Limit (2 pts)  TOTAL:    Percent Risk for Major Adverse Cardiac Event (MACE)  0-3 pts indicates low risk for MACE   2.5% (DISCHARGE)   4-7 pts indicates moderate risk for MACE  20.3% (OBS)  8-10 pts indicates high risk for MACE  72.7% (EARLY INVASIVE TX)    CDU IMPRESSION / Tamie Perez is a 65 y.o. female who presents with left lower back pain/sciatica pain    Neurosurgery consulted:  No surgical intervention planned at this time. Recommend repeat MRI of the lumbar spine without contrast  Avoid lifting heavy objects  Neurosurgery has placed a referral to neurology  Patient should follow-up in 2 months as outpatient  Physical therapy consulted:   They recommend 5 to 6 weeks of outpatient therapy  Continue home medications and pain control  Monitor vitals, labs, and imaging  DISPO: pending consults and clinical improvement    CONSULTS:    None    PROCEDURES:  Not indicated       PATIENT REFERRED TO:    No follow-up provider specified. --  Madiha Verdin,    Emergency Medicine Resident     This dictation was generated by voice recognition computer software. Although all attempts are made to edit the dictation for accuracy, there may be errors in the transcription that are not intended.

## 2022-10-02 ENCOUNTER — APPOINTMENT (OUTPATIENT)
Dept: MRI IMAGING | Age: 62
DRG: 552 | End: 2022-10-02

## 2022-10-02 ENCOUNTER — ANESTHESIA EVENT (OUTPATIENT)
Dept: OPERATING ROOM | Age: 62
DRG: 552 | End: 2022-10-02

## 2022-10-02 PROBLEM — M54.9 BACK PAIN WITH RADIATION: Status: ACTIVE | Noted: 2022-10-02

## 2022-10-02 LAB
GLUCOSE BLD-MCNC: 132 MG/DL (ref 65–105)
GLUCOSE BLD-MCNC: 134 MG/DL (ref 65–105)
GLUCOSE BLD-MCNC: 139 MG/DL (ref 65–105)
GLUCOSE BLD-MCNC: 192 MG/DL (ref 65–105)

## 2022-10-02 PROCEDURE — 6370000000 HC RX 637 (ALT 250 FOR IP): Performed by: STUDENT IN AN ORGANIZED HEALTH CARE EDUCATION/TRAINING PROGRAM

## 2022-10-02 PROCEDURE — 6360000002 HC RX W HCPCS

## 2022-10-02 PROCEDURE — 96376 TX/PRO/DX INJ SAME DRUG ADON: CPT

## 2022-10-02 PROCEDURE — 82947 ASSAY GLUCOSE BLOOD QUANT: CPT

## 2022-10-02 PROCEDURE — 6370000000 HC RX 637 (ALT 250 FOR IP)

## 2022-10-02 PROCEDURE — 1200000000 HC SEMI PRIVATE

## 2022-10-02 PROCEDURE — 2580000003 HC RX 258: Performed by: STUDENT IN AN ORGANIZED HEALTH CARE EDUCATION/TRAINING PROGRAM

## 2022-10-02 PROCEDURE — 72148 MRI LUMBAR SPINE W/O DYE: CPT

## 2022-10-02 RX ORDER — DEXTROSE MONOHYDRATE 100 MG/ML
INJECTION, SOLUTION INTRAVENOUS CONTINUOUS PRN
Status: DISCONTINUED | OUTPATIENT
Start: 2022-10-02 | End: 2022-10-09 | Stop reason: HOSPADM

## 2022-10-02 RX ORDER — MORPHINE SULFATE 4 MG/ML
4 INJECTION, SOLUTION INTRAMUSCULAR; INTRAVENOUS ONCE
Status: COMPLETED | OUTPATIENT
Start: 2022-10-02 | End: 2022-10-02

## 2022-10-02 RX ADMIN — BUSPIRONE HYDROCHLORIDE 10 MG: 10 TABLET ORAL at 13:34

## 2022-10-02 RX ADMIN — BUSPIRONE HYDROCHLORIDE 10 MG: 10 TABLET ORAL at 19:58

## 2022-10-02 RX ADMIN — CELECOXIB 100 MG: 100 CAPSULE ORAL at 08:27

## 2022-10-02 RX ADMIN — GABAPENTIN 300 MG: 300 CAPSULE ORAL at 08:26

## 2022-10-02 RX ADMIN — GLIPIZIDE 10 MG: 10 TABLET ORAL at 15:55

## 2022-10-02 RX ADMIN — OXYCODONE 10 MG: 5 TABLET ORAL at 06:35

## 2022-10-02 RX ADMIN — GLIPIZIDE 10 MG: 10 TABLET ORAL at 08:25

## 2022-10-02 RX ADMIN — CELECOXIB 100 MG: 100 CAPSULE ORAL at 19:58

## 2022-10-02 RX ADMIN — SERTRALINE 50 MG: 50 TABLET, FILM COATED ORAL at 08:28

## 2022-10-02 RX ADMIN — SODIUM CHLORIDE, PRESERVATIVE FREE 10 ML: 5 INJECTION INTRAVENOUS at 08:29

## 2022-10-02 RX ADMIN — MAGNESIUM GLUCONATE 500 MG ORAL TABLET 400 MG: 500 TABLET ORAL at 08:25

## 2022-10-02 RX ADMIN — MORPHINE SULFATE 4 MG: 4 INJECTION INTRAVENOUS at 18:24

## 2022-10-02 RX ADMIN — CYCLOBENZAPRINE 10 MG: 10 TABLET, FILM COATED ORAL at 13:34

## 2022-10-02 RX ADMIN — ACETAMINOPHEN 1000 MG: 500 TABLET ORAL at 06:35

## 2022-10-02 RX ADMIN — GABAPENTIN 300 MG: 300 CAPSULE ORAL at 19:59

## 2022-10-02 RX ADMIN — OXYCODONE 10 MG: 5 TABLET ORAL at 19:59

## 2022-10-02 RX ADMIN — DIAZEPAM 2 MG: 2 TABLET ORAL at 09:09

## 2022-10-02 RX ADMIN — CYCLOBENZAPRINE 10 MG: 10 TABLET, FILM COATED ORAL at 06:35

## 2022-10-02 RX ADMIN — ACETAMINOPHEN 1000 MG: 500 TABLET ORAL at 13:34

## 2022-10-02 RX ADMIN — DESMOPRESSIN ACETATE 40 MG: 0.2 TABLET ORAL at 19:59

## 2022-10-02 RX ADMIN — PANTOPRAZOLE SODIUM 40 MG: 40 TABLET, DELAYED RELEASE ORAL at 08:25

## 2022-10-02 RX ADMIN — OXYCODONE 10 MG: 5 TABLET ORAL at 15:55

## 2022-10-02 RX ADMIN — GABAPENTIN 300 MG: 300 CAPSULE ORAL at 13:34

## 2022-10-02 RX ADMIN — MORPHINE SULFATE 4 MG: 4 INJECTION INTRAVENOUS at 08:00

## 2022-10-02 RX ADMIN — LISINOPRIL 40 MG: 20 TABLET ORAL at 08:26

## 2022-10-02 RX ADMIN — ROPINIROLE HYDROCHLORIDE 2 MG: 1 TABLET, FILM COATED ORAL at 19:59

## 2022-10-02 RX ADMIN — SODIUM CHLORIDE, PRESERVATIVE FREE 10 ML: 5 INJECTION INTRAVENOUS at 20:01

## 2022-10-02 RX ADMIN — BUSPIRONE HYDROCHLORIDE 10 MG: 10 TABLET ORAL at 08:25

## 2022-10-02 RX ADMIN — OXYCODONE 10 MG: 5 TABLET ORAL at 11:35

## 2022-10-02 RX ADMIN — PANTOPRAZOLE SODIUM 40 MG: 40 TABLET, DELAYED RELEASE ORAL at 15:55

## 2022-10-02 ASSESSMENT — PAIN SCALES - GENERAL
PAINLEVEL_OUTOF10: 0
PAINLEVEL_OUTOF10: 9
PAINLEVEL_OUTOF10: 9
PAINLEVEL_OUTOF10: 10
PAINLEVEL_OUTOF10: 9
PAINLEVEL_OUTOF10: 9
PAINLEVEL_OUTOF10: 10

## 2022-10-02 ASSESSMENT — PAIN DESCRIPTION - DESCRIPTORS
DESCRIPTORS: THROBBING
DESCRIPTORS: SHOOTING
DESCRIPTORS: THROBBING

## 2022-10-02 ASSESSMENT — PAIN DESCRIPTION - LOCATION
LOCATION: BACK
LOCATION: HIP
LOCATION: HIP
LOCATION: LEG

## 2022-10-02 ASSESSMENT — PAIN DESCRIPTION - ORIENTATION
ORIENTATION: LEFT
ORIENTATION: LOWER;LEFT
ORIENTATION: LEFT
ORIENTATION: LEFT

## 2022-10-02 NOTE — PROGRESS NOTES
OBS/CDU   RESIDENT NOTE      Patients PCP is:  Royden Boast, MD        SUBJECTIVE      No acute events overnight. Has been able to tolerate a full diet without nausea or vomiting. The patient is urinating on his own and is passing flatus. Denies fever, chills, nausea, vomiting, chest pain, shortness of breath, abdominal pain, focal weakness, numbness, tingling, urinary/bowel symptoms, vision changes, visual hallucinations, or headache. This morning the patient was in no acute distress. Patient resting comfortably in bed however she did complain of some pain in her left lower extremity especially when moving the limb. Earliest morning the patient had some breakthrough pain and was given 4 mg IV morphine, which the patient reports has helped. Pending MRI results and neurosurgery follow-up. PHYSICAL EXAM      General: NAD, AO X 3  Heent: EMOI, PERRL  Neck: SUPPLE, NO JVD  Cardiovascular: RRR, S1S2  Pulmonary: CTAB, NO SOB  Abdomen: SOFT, NTTP, ND, +BS  Extremities: +2/4 PULSES DISTAL, NO SWELLING  Neuro / Psych: NO NUMBNESS OR TINGLING, MENTATION AT BASELINE    PERTINENT TEST /EXAMS      I have reviewed all available laboratory results.     MEDICATIONS CURRENT   cyclobenzaprine (FLEXERIL) tablet 10 mg, TID PRN  Empagliflozin-metFORMIN HCl 5-1000 MG TABS 1 tablet (Patient Supplied), BID  diazePAM (VALIUM) tablet 2 mg, Once  oxyCODONE (ROXICODONE) immediate release tablet 10 mg, Q4H PRN  lidocaine 4 % external patch 1 patch, Daily  atorvastatin (LIPITOR) tablet 40 mg, Nightly  busPIRone (BUSPAR) tablet 10 mg, TID  celecoxib (CELEBREX) capsule 100 mg, BID  furosemide (LASIX) tablet 10 mg, Every Other Day  gabapentin (NEURONTIN) capsule 300 mg, TID  glipiZIDE (GLUCOTROL) tablet 10 mg, BID AC  lisinopril (PRINIVIL;ZESTRIL) tablet 40 mg, Daily  magnesium oxide (MAG-OX) tablet 400 mg, Daily  pantoprazole (PROTONIX) tablet 40 mg, BID AC  rOPINIRole (REQUIP) tablet 2 mg, Nightly  sertraline (ZOLOFT) tablet 50 mg, Daily  sodium chloride flush 0.9 % injection 5-40 mL, 2 times per day  sodium chloride flush 0.9 % injection 5-40 mL, PRN  0.9 % sodium chloride infusion, PRN  ondansetron (ZOFRAN-ODT) disintegrating tablet 4 mg, Q8H PRN   Or  ondansetron (ZOFRAN) injection 4 mg, Q6H PRN  polyethylene glycol (GLYCOLAX) packet 17 g, Daily PRN  acetaminophen (TYLENOL) tablet 1,000 mg, 3 times per day        All medication charted and reviewed. CONSULTS      IP CONSULT TO NEUROSURGERY    ASSESSMENT/PLAN       Gill Diana is a 65 y.o. female who presents with left lower back pain/sciatica pain. Neurosurgery consulted:  MRI lumbar spine without contrast:  Degenerative change most pronounced in the lower lumbar spine with mild right foraminal narrowing at L5-S1 and lateral recess narrowing that is worse on the right compared to the left. Pending neurosurgery's reassessment of patient. Initial plan heard with Dr. Chauncey Costello that he would perform a lumbar injection to help patient with pain. Will reconfirm plan once neurosurgery finalizes their note. Physical therapy consulted: They recommend 5 to 6 weeks of outpatient therapy for strengthening and functional mobility. Continue home medications and pain control  Monitor vitals, labs, and imaging  DISPO: pending consults and clinical improvement    --  [facility], DO  Emergency Medicine Resident Physician     This dictation was generated by voice recognition computer software. Although all attempts are made to edit the dictation for accuracy, there may be errors in the transcription that are not intended.

## 2022-10-02 NOTE — PROGRESS NOTES
Sarika  22.    Department of Neurosurgery  Resident Progress Note      Reason for Consult: Left lower back pain radiating to left lower extremity, history of sciatica  Requesting Physician:  hospitalist  Neurosurgeon:   []Dr. Maite Yarbrough  []Dr. Katie Courtney  []Dr. Elma Edmonds  []Dr. Kathy Delgado  []Dr. Katherine Patel  [x]Dr. Ayleen George    History Obtained From:  patient    CHIEF COMPLAINT:         Left lower back pain radiating to left lower extremity, history of sciatica    HISTORY OF PRESENT ILLNESS:       71-year-old female presented with complaint of left-sided back pain radiating to left leg. As per patient, she had left lower back pain started in June 2021 and had multiple exacerbations with few ED visits. She was admitted into pain in May 2022. She follows with Dr. Elma Edmonds  for ulnar neuropathy and left lower radiculopathy and is being worked up for ulnar nerve surgery as she was told that it is for acute. She has been doing regular PT and aquatic therapy and has been getting strength back. She states that she may have overdone it last week working at her [employer] but denies any heavy lifting. Yesterday she complained of sudden onset severe sharp left lumbar pain 8/10 intensity radiating down to her leg up till ankle. Neurosurgery has been consulted for evaluation of acute left lower back pain. On my evaluation, patient was awake alert and oriented without any cranial or visual or motor deficit in the bilateral upper extremity no sensory deficit noticed. No motor or sensory deficit noticed in the right lower extremity. She was in moderate pain which was worsened by significant change in posture the back. Straight leg raise test positive on the left side. Strength 3+/5. Poor effort due to complaint of pain. Decreased sensation in the left lower extremity. Plan to get MRI lumbar spine without contrast for evaluation.     Of note, patient was recently seen by neurosurgery on 9/22 by Dr. Nargis Crain for bilateral ulnar neuropathy and being worked up for upcoming surgery. Previous MRI lumbar spine without contrast showed:    No significant change compared to MR lumbar spine from 2022. Disc bulging at L5-S1 with asymmetric encroachment on the left lateral recess   and abutment of the transversing left S1 nerve root. 10/2: Discussed with patient MRI results and plan to go ahead with sacroiliac analgesic injection tomorrow under anesthesia. N.p.o. after midnight. On my evaluation, patient has weakness in the right lower extremity 4 -/5.     PAST MEDICAL HISTORY :       Past Medical History:        Diagnosis Date    Cervical spondylosis 2009    c-4 c-5, c-5 c-6, c-6 c-7    Generalized anxiety disorder 2020    Hyperlipidemia     Hypertension     Lumbar radiculopathy     DIEGO (nonalcoholic steatohepatitis) 10/12/2014    Obesity     Osteoarthritis of left knee 2014    Recurrent major depressive disorder ([facility] [address] 07/15/2022    Restless legs syndrome     TIA (transient ischemic attack)     no residual symptoms    Type II or unspecified type diabetes mellitus without mention of complication, not stated as uncontrolled        Past Surgical History:        Procedure Laterality Date     SECTION      COLONOSCOPY  2012    [facility]    HYSTERECTOMY, TOTAL ABDOMINAL (CERVIX REMOVED)      Polymenorrhagia    SHOULDER SURGERY Left 2021    SHOULDER MANIPULATION WITH PRE OP INTERSCALENE BLOCK and intraop injection performed by Rosetta Lyman DO at [facility]  2012    [facility]       Social History:   Social History     Socioeconomic History    Marital status:      Spouse name: Not on file    Number of children: Not on file    Years of education: Not on file    Highest education level: Not on file   Occupational History    Not on file   Tobacco Use    Smoking status: Never    Smokeless tobacco: Never Vaping Use    Vaping Use: Never used   Substance and Sexual Activity    Alcohol use: Yes     Alcohol/week: 0.0 standard drinks     Comment: rarely/ 4 times a year    Drug use: No    Sexual activity: Yes   Other Topics Concern    Not on file   Social History Narrative    Not on file     Social Determinants of Health     Financial Resource Strain: Low Risk     Difficulty of Paying Living Expenses: Not hard at all   Food Insecurity: No Food Insecurity    Worried About [address] in the Last Year: Never true    [address] in the Last Year: Never true   Transportation Needs: No Transportation Needs    Lack of Transportation (Medical): No    Lack of Transportation (Non-Medical): No   Physical Activity: Unknown    Days of Exercise per Week: 0 days    Minutes of Exercise per Session: Not on file   Stress: Stress Concern Present    Feeling of Stress : To some extent   Social Connections: Socially Integrated    Frequency of Communication with Friends and Family: More than three times a week    Frequency of Social Gatherings with Friends and Family: Once a week    Attends [employer] Services: More than 4 times per year    Active Member of [employer] or Organizations: Yes    Attends Club or Organization Meetings: More than 4 times per year    Marital Status:    Intimate Partner Violence: Not At Risk    Fear of Current or Ex-Partner: No    Emotionally Abused: No    Physically Abused: No    Sexually Abused: No   Housing Stability: Low Risk     Unable to Pay for Housing in the Last Year: No    Number of [place] in the Last Year: 1    Unstable Housing in the Last Year: No       Family History:       Problem Relation Age of Onset    Heart Attack Mother     Diabetes Mother     Diabetes Father     Heart Attack Father        Allergies:   Codeine    Home Medications:  Prior to Admission medications    Medication Sig Start Date End Date Taking?  Authorizing Provider   sertraline (ZOLOFT) 50 MG tablet TAKE ONE TABLET BY MOUTH EVERY MORNING 9/21/22   Yaa Robles MD   TRULICITY 1.5 HE/4.3BW [facility] INJECT THE CONTENTS OF ONE SYRINGE UNDER THE SKIN ONCE WEEKLY 9/21/22   Yaa Robles MD   celecoxib (CELEBREX) 100 MG capsule TAKE 1 CAPSULE BY MOUTH 2 TIMES A DAY 9/8/22   Chai Archibald MD   SYNJARDY 5-1000 MG TABS TAKE 1 TABLET BY MOUTH 2 TIMES A DAY 8/30/22   Yaa Robles MD   lisinopril (PRINIVIL;ZESTRIL) 40 MG tablet Take 1 tablet by mouth daily 8/25/22   Brien Paiz MD   busPIRone (BUSPAR) 10 MG tablet TAKE 1 TABLET BY MOUTH 3 TIMES A DAY 8/23/22   Sangeeta Gonzalez MD   Elastic Bandages & Supports (ELBOW BRACE) [facility] 1 each by Does not apply route at bedtime Please dispense left cubital tunnel brace 8/18/22 11/16/22  [employer], APRN - CNP   ASPIRIN LOW DOSE 81 MG EC tablet TAKE 1 TABLET BY MOUTH ONE TIME A DAY 8/10/22   Yaa Roblse MD   alendronate (FOSAMAX) 70 MG tablet Take 1 tablet by mouth every 7 days 7/29/22   Temo Velarde MD   oxyCODONE-acetaminophen (PERCOCET) 5-325 MG per tablet Take by mouth every 4 hours as needed for Pain. Historical Provider, MD   atorvastatin (LIPITOR) 40 MG tablet Take 1 tablet by mouth at bedtime 7/15/22   Anastacia Landa MD   rOPINIRole (REQUIP) 1 MG tablet Take 2 tablets by mouth nightly 7/15/22   Anastacia Landa MD   acetaminophen (TYLENOL 8 HOUR) 650 MG extended release tablet Take 1 tablet by mouth every 8 hours as needed for Pain 7/15/22   Kian Sloan MD   miconazole (ZEASORB-AF) 2 % powder Apply 43 g topically as needed for Itching Apply topically 2 times daily. 7/15/22   Anastacia Landa MD   furosemide (LASIX) 20 MG tablet Take 0.5 tablets by mouth every other day 7/15/22   Kian Sloan MD   diclofenac sodium (VOLTAREN) 1 % GEL Apply 2 g topically in the morning and 2 g at noon and 2 g in the evening and 2 g before bedtime.  7/15/22   Anastacia Landa MD   blood glucose test strips (PRODIGY NO CODING BLOOD GLUC) strip Use to test once daily and as needed as directed by provider. Please dispense Prodigy brand. 7/12/22   Erica Castellanos MD   blood glucose monitor kit and supplies Dispense sufficient amount for indicated testing frequency plus additional to accommodate PRN testing needs. Dispense all needed supplies to include: monitor, strips, lancing device, lancets, control solutions, alcohol swabs. Prodigy Brand 7/6/22   Erica Castellanos MD   Magnesium Oxide 400 MG CAPS Take 400 mg by mouth daily 7/6/22   Mo'Men Cindy Stover MD   gabapentin (NEURONTIN) 300 MG capsule Take 1 capsule by mouth 3 times daily for 30 days.  6/9/22 7/9/22  Latesha Dubon MD   glipiZIDE (GLUCOTROL) 10 MG tablet Take 1 tab bid 1/13/22   Erica Castellanos MD   omeprazole (PRILOSEC) 20 MG delayed release capsule Take 1 capsule by mouth 2 times daily 7/26/21   Erica Castellanos MD   albuterol sulfate HFA (PROVENTIL HFA) 108 (90 Base) MCG/ACT inhaler Inhale 2 puffs into the lungs every 4 hours as needed for Wheezing 4/20/21 4/20/22  Erica Castellanos MD   repaglinide (PRANDIN) 1 MG tablet TAKE 1 TABLET BY MOUTH 3 TIMES A DAY BEFORE MEALS 12/8/20   Erica Castellanos MD   bimatoprost (LUMIGAN) 0.01 % SOLN ophthalmic drops Place 1 drop into both eyes nightly 4/26/18   Historical Provider, MD   PRODIGY LANCETS 28G MISC 1 Bottle by Does not apply route three times daily 4/26/19   Erica Castellanos MD   Blood Glucose Monitoring Suppl (PRODIGY AUTOCODE BLOOD GLUCOSE) w/Device KIT 1 Device by Does not apply route 3 times daily 4/26/19   Erica Castellanos MD       Current Medications:   Current Facility-Administered Medications: cyclobenzaprine (FLEXERIL) tablet 10 mg, 10 mg, Oral, TID PRN  Empagliflozin-metFORMIN HCl 5-1000 MG TABS 1 tablet (Patient Supplied), 1 tablet, Oral, BID  diazePAM (VALIUM) tablet 2 mg, 2 mg, Oral, Once  oxyCODONE (ROXICODONE) immediate release tablet 10 mg, 10 mg, Oral, Q4H PRN  lidocaine 4 % external patch 1 patch, 1 patch, TransDERmal, Daily  atorvastatin (LIPITOR) tablet 40 mg, 40 mg, Oral, Nightly  busPIRone (BUSPAR) tablet 10 mg, 10 mg, Oral, TID  celecoxib (CELEBREX) capsule 100 mg, 100 mg, Oral, BID  furosemide (LASIX) tablet 10 mg, 10 mg, Oral, Every Other Day  gabapentin (NEURONTIN) capsule 300 mg, 300 mg, Oral, TID  glipiZIDE (GLUCOTROL) tablet 10 mg, 10 mg, Oral, BID AC  lisinopril (PRINIVIL;ZESTRIL) tablet 40 mg, 40 mg, Oral, Daily  magnesium oxide (MAG-OX) tablet 400 mg, 400 mg, Oral, Daily  pantoprazole (PROTONIX) tablet 40 mg, 40 mg, Oral, BID AC  rOPINIRole (REQUIP) tablet 2 mg, 2 mg, Oral, Nightly  sertraline (ZOLOFT) tablet 50 mg, 50 mg, Oral, Daily  sodium chloride flush 0.9 % injection 5-40 mL, 5-40 mL, IntraVENous, 2 times per day  sodium chloride flush 0.9 % injection 5-40 mL, 5-40 mL, IntraVENous, PRN  0.9 % sodium chloride infusion, , IntraVENous, PRN  ondansetron (ZOFRAN-ODT) disintegrating tablet 4 mg, 4 mg, Oral, Q8H PRN **OR** ondansetron (ZOFRAN) injection 4 mg, 4 mg, IntraVENous, Q6H PRN  polyethylene glycol (GLYCOLAX) packet 17 g, 17 g, Oral, Daily PRN  acetaminophen (TYLENOL) tablet 1,000 mg, 1,000 mg, Oral, 3 times per day    REVIEW OF SYSTEMS:       General ROS - No fevers, no chills  Ophthalmic ROS - No changes in vision from baseline  ENT ROS -  No sore throat, no rhinorrhea  Respiratory ROS - no cough, no shortness of breath  Cardiovascular ROS - No chest pain  Gastrointestinal ROS - No abdominal pain, no nausea, no vomiting  Genito-Urinary ROS - No dysuria  Musculoskeletal ROS - No neck pain, left lower back pain  Neurological ROS - No focal weakness or loss of sensation, no headache except left lower extremity weakness and numbness  Dermatological ROS - No lesions, no rash  Vascular/Lymphatic ROS - No edema    PHYSICAL EXAM:       Vitals:    10/01/22 2022   BP: 101/70   Pulse: 70   Resp: 17   Temp: 98.1 °F (36.7 °C)   SpO2: 92%       CONSTITUTIONAL:  Well developed, well nourished, alert and oriented x 3, in no acute distress, nontoxic.  No dysarthria, no aphasia. EOMI. HEAD:  normocephalic, atraumatic    EYES:  PERRLA, EOMI   ENT:  moist mucous membranes, no stridor, no tracheal deviation   NECK:  no midline tenderness to palpation, no step-offs or deformities   BACK:  no midline tenderness to palpation, no step-offs or deformities    LUNGS:  CTAB, equal air entry bilaterally, no wheezes or rales   CARDIOVASCULAR:  RRR, no murmur   ABDOMEN:  Soft, no rigidity   NEUROLOGIC:  EYE OPENING     Spontaneous - 4 [x]       To voice - 3 []       To pain - 2 []       None - 1 []    VERBAL RESPONSE     Appropriate, oriented - 5 [x]       Dazed or confused - 4 []       Syllables, expletives - 3 []       Grunts - 2 []       None - 1 []    MOTOR RESPONSE     Spontaneous, command - 6 [x]       Localizes pain - 5 []       Withdraws pain - 4 []       Abnormal flexion - 3 []       Abnormal extension - 2 []       None - 1 []            Total GCS: 15    Mental Status:  A & O x3, awake             Cranial Nerves:    cranial nerves II-XII are grossly intact    Motor Exam:    Drift:  absent  Tone:  normal    Motor exam is 5 out of 5 all extremities with the exception of LUE 4-/5 (poor effort due to pain)    Sensory:    Touch:    Right Upper Extremity:  normal  Left Upper Extremity:  normal  Right Lower Extremity:  normal  Left Lower Extremity:  abnormal - decrease pinprick and gross touch sensation. Deep Tendon Reflexes:    Right Bicep:  1+  Left Bicep:  1+  Right Knee:  2+  Left Knee:  2+    Plantar Response:    Right:  downgoing  Left:  downgoing    Clonus:  present  Boyer's:  absent    Coordination/Dysmetria:  Heel to Shin:  Right:  normal  Left:  unable to perform due to weakness  Finger to Nose:   Right:  normal  Left:  normal     Gait:  deffered. Striaght leg raise test positive on the left side.    SKIN:  no rash      LABS AND IMAGING:     Labs:  CBC with Differential:    Lab Results   Component Value Date/Time    WBC 10.9 06/13/2022 05:50 AM    RBC 3.59 06/13/2022 05:50 AM    HGB 10.3 06/13/2022 05:50 AM    HCT 33.7 06/13/2022 05:50 AM     06/13/2022 05:50 AM    MCV 93.9 06/13/2022 05:50 AM    MCH 28.7 06/13/2022 05:50 AM    MCHC 30.6 06/13/2022 05:50 AM    RDW 13.4 06/13/2022 05:50 AM    LYMPHOPCT 25 06/13/2022 05:50 AM    MONOPCT 8 06/13/2022 05:50 AM    BASOPCT 1 06/13/2022 05:50 AM    MONOSABS 0.87 06/13/2022 05:50 AM    LYMPHSABS 2.71 06/13/2022 05:50 AM    EOSABS 0.67 06/13/2022 05:50 AM    BASOSABS 0.07 06/13/2022 05:50 AM    DIFFTYPE NOT REPORTED 07/21/2021 10:15 AM     BMP:    Lab Results   Component Value Date/Time     07/06/2022 03:40 PM    K 4.6 07/06/2022 03:40 PM     07/06/2022 03:40 PM    CO2 25 07/06/2022 03:40 PM    BUN 15 07/06/2022 03:40 PM    LABALBU 4.5 05/31/2022 04:54 PM    LABALBU 4.1 05/19/2012 12:05 PM    CREATININE 1.00 07/06/2022 03:40 PM    CALCIUM 9.3 07/06/2022 03:40 PM    GFRAA >60 07/06/2022 03:40 PM    LABGLOM 56 07/06/2022 03:40 PM    GLUCOSE 114 07/06/2022 03:40 PM    GLUCOSE 137 05/19/2012 12:05 PM       Radiology Review:      MRI LUMBAR SPINE WO CONTRAST    Result Date: 10/2/2022  Degenerative change most pronounced in the lower lumbar spine with mild right foraminal narrowing at L5-S1 and lateral recess narrowing that is worse on the right compared to the left.       ASSESSMENT AND PLAN:       Patient Active Problem List   Diagnosis    Cervical spondylosis    Type 2 diabetes mellitus without complication, without long-term current use of insulin ([facility])    Hypertension    Abnormal auditory perception    Eustachian tube dysfunction    Chronic serous otitis media    Nasal polyps    Nasal congestion    Osteoarthritis of left knee    Osteoarthritis of right knee    DIEGO (nonalcoholic steatohepatitis)    Vitamin D deficiency    Iron deficiency anemia    Dyslipidemia    Diabetes mellitus type 2, uncontrolled    Left hip pain    Trochanteric bursitis    Fatigue    Daytime somnolence    Snoring    Chronic left shoulder pain    Restless legs syndrome    Generalized anxiety disorder    Primary osteoarthritis, left shoulder    Tendinopathy of left shoulder    Adhesive capsulitis of left shoulder    Lumbar radiculopathy    Back pain    Toxic metabolic encephalopathy    Class 2 obesity in adult    Ulnar neuropathy at elbow, right    Leg edema    Recurrent major depressive disorder (HCC)    Dermatitis    Retrolisthesis of vertebrae    Bilateral carpal tunnel syndrome    Intention tremor    Sciatica       A/P:  Kaylee Arroyo is a 65 y.o. female who presents with left lower back pain radiating to left lower extremity. History disc bulge at L5-S1 with concern for encroachment at S1 nerve root. Patient care will be discussed with attending, will reevaluate patient along with attending     - No neurosurgical interventions planned for now. - Repeat MRI lumbar spine without contrast showed Degenerative changes most pronounced in the lower lumbar spine with mild right foraminal narrowing at L5-S1 and lateral recess narrowing that is worse on the right compared to the left. - Discussed with patient MRI results and plan to go ahead with sacroiliac analgesic injection tomorrow under anesthesia. N.p.o. after midnight. - Avoid lifting heavy objects. Additional recommendations may follow    Please contact neurosurgery with any changes in patient's neurologic status. Thank you for your consult.        Sangeeta Llamas MD    PGY-2 Emergency Medicine Resident Physician  Neurosurgery Team - pager [phone]  10/2/2022 6:21 AM

## 2022-10-02 NOTE — PROGRESS NOTES
[facility]  CDU / OBSERVATION eNCOUnter  Attending NOte       I performed a history and physical examination of the patient and discussed management with the resident. I reviewed the residents note and agree with the documented findings and plan of care. Any areas of disagreement are noted on the chart. I was personally present for the key portions of any procedures. I have documented in the chart those procedures where I was not present during the key portions. I have reviewed the nurses notes. I agree with the chief complaint, past medical history, past surgical history, allergies, medications, social and family history as documented unless otherwise noted below. The Family history, social history, and ROS are effectively unchanged since admission unless noted elsewhere in the chart. Patient just returned from MRI. She has continued sciatica symptoms on the left, which has been an ongoing intermittent issue for over a year. Neurosurgery consulted.      Rexie Bosworth, MD  Attending Emergency  Physician

## 2022-10-03 ENCOUNTER — ANESTHESIA (OUTPATIENT)
Dept: OPERATING ROOM | Age: 62
DRG: 552 | End: 2022-10-03

## 2022-10-03 ENCOUNTER — APPOINTMENT (OUTPATIENT)
Dept: GENERAL RADIOLOGY | Age: 62
DRG: 552 | End: 2022-10-03

## 2022-10-03 PROBLEM — M19.012 PRIMARY OSTEOARTHRITIS, LEFT SHOULDER: Status: RESOLVED | Noted: 2020-12-08 | Resolved: 2022-10-03

## 2022-10-03 PROBLEM — G56.21 ULNAR NEUROPATHY AT ELBOW, RIGHT: Status: RESOLVED | Noted: 2022-06-14 | Resolved: 2022-10-03

## 2022-10-03 PROBLEM — M54.9 BACK PAIN: Status: RESOLVED | Noted: 2022-05-31 | Resolved: 2022-10-03

## 2022-10-03 PROBLEM — M67.912 TENDINOPATHY OF LEFT SHOULDER: Status: RESOLVED | Noted: 2020-12-08 | Resolved: 2022-10-03

## 2022-10-03 LAB
GLUCOSE BLD-MCNC: 105 MG/DL (ref 65–105)
GLUCOSE BLD-MCNC: 207 MG/DL (ref 65–105)
GLUCOSE BLD-MCNC: 232 MG/DL (ref 65–105)
GLUCOSE BLD-MCNC: 263 MG/DL (ref 65–105)

## 2022-10-03 PROCEDURE — 7100000011 HC PHASE II RECOVERY - ADDTL 15 MIN: Performed by: NEUROLOGICAL SURGERY

## 2022-10-03 PROCEDURE — 3700000000 HC ANESTHESIA ATTENDED CARE: Performed by: NEUROLOGICAL SURGERY

## 2022-10-03 PROCEDURE — 3600000003 HC SURGERY LEVEL 3 BASE: Performed by: NEUROLOGICAL SURGERY

## 2022-10-03 PROCEDURE — 6360000002 HC RX W HCPCS: Performed by: NEUROLOGICAL SURGERY

## 2022-10-03 PROCEDURE — 2580000003 HC RX 258

## 2022-10-03 PROCEDURE — 97530 THERAPEUTIC ACTIVITIES: CPT

## 2022-10-03 PROCEDURE — 2500000003 HC RX 250 WO HCPCS: Performed by: NEUROLOGICAL SURGERY

## 2022-10-03 PROCEDURE — 2709999900 HC NON-CHARGEABLE SUPPLY: Performed by: NEUROLOGICAL SURGERY

## 2022-10-03 PROCEDURE — 6360000004 HC RX CONTRAST MEDICATION: Performed by: NEUROLOGICAL SURGERY

## 2022-10-03 PROCEDURE — 6370000000 HC RX 637 (ALT 250 FOR IP)

## 2022-10-03 PROCEDURE — 3600000013 HC SURGERY LEVEL 3 ADDTL 15MIN: Performed by: NEUROLOGICAL SURGERY

## 2022-10-03 PROCEDURE — 6360000002 HC RX W HCPCS

## 2022-10-03 PROCEDURE — 2580000003 HC RX 258: Performed by: PHYSICIAN ASSISTANT

## 2022-10-03 PROCEDURE — 2700000000 HC OXYGEN THERAPY PER DAY

## 2022-10-03 PROCEDURE — 1200000000 HC SEMI PRIVATE

## 2022-10-03 PROCEDURE — 6370000000 HC RX 637 (ALT 250 FOR IP): Performed by: PHYSICIAN ASSISTANT

## 2022-10-03 PROCEDURE — 3E0U3GC INTRODUCTION OF OTHER THERAPEUTIC SUBSTANCE INTO JOINTS, PERCUTANEOUS APPROACH: ICD-10-PCS | Performed by: NEUROLOGICAL SURGERY

## 2022-10-03 PROCEDURE — 7100000010 HC PHASE II RECOVERY - FIRST 15 MIN: Performed by: NEUROLOGICAL SURGERY

## 2022-10-03 PROCEDURE — APPSS15 APP SPLIT SHARED TIME 0-15 MINUTES: Performed by: PHYSICIAN ASSISTANT

## 2022-10-03 PROCEDURE — 97116 GAIT TRAINING THERAPY: CPT

## 2022-10-03 PROCEDURE — 6360000002 HC RX W HCPCS: Performed by: STUDENT IN AN ORGANIZED HEALTH CARE EDUCATION/TRAINING PROGRAM

## 2022-10-03 PROCEDURE — 3209999900 FLUORO FOR SURGICAL PROCEDURES

## 2022-10-03 PROCEDURE — 6370000000 HC RX 637 (ALT 250 FOR IP): Performed by: STUDENT IN AN ORGANIZED HEALTH CARE EDUCATION/TRAINING PROGRAM

## 2022-10-03 PROCEDURE — 3700000001 HC ADD 15 MINUTES (ANESTHESIA): Performed by: NEUROLOGICAL SURGERY

## 2022-10-03 PROCEDURE — 82947 ASSAY GLUCOSE BLOOD QUANT: CPT

## 2022-10-03 RX ORDER — HYDRALAZINE HYDROCHLORIDE 20 MG/ML
10 INJECTION INTRAMUSCULAR; INTRAVENOUS
Status: DISCONTINUED | OUTPATIENT
Start: 2022-10-03 | End: 2022-10-03 | Stop reason: HOSPADM

## 2022-10-03 RX ORDER — SODIUM CHLORIDE 9 MG/ML
INJECTION, SOLUTION INTRAVENOUS PRN
Status: DISCONTINUED | OUTPATIENT
Start: 2022-10-03 | End: 2022-10-03 | Stop reason: HOSPADM

## 2022-10-03 RX ORDER — SODIUM CHLORIDE 0.9 % (FLUSH) 0.9 %
5-40 SYRINGE (ML) INJECTION PRN
Status: DISCONTINUED | OUTPATIENT
Start: 2022-10-03 | End: 2022-10-03 | Stop reason: HOSPADM

## 2022-10-03 RX ORDER — SODIUM CHLORIDE 0.9 % (FLUSH) 0.9 %
5-40 SYRINGE (ML) INJECTION EVERY 12 HOURS SCHEDULED
Status: DISCONTINUED | OUTPATIENT
Start: 2022-10-03 | End: 2022-10-03 | Stop reason: HOSPADM

## 2022-10-03 RX ORDER — LIDOCAINE HYDROCHLORIDE 10 MG/ML
INJECTION, SOLUTION EPIDURAL; INFILTRATION; INTRACAUDAL; PERINEURAL PRN
Status: DISCONTINUED | OUTPATIENT
Start: 2022-10-03 | End: 2022-10-03 | Stop reason: HOSPADM

## 2022-10-03 RX ORDER — DEXAMETHASONE SODIUM PHOSPHATE 10 MG/ML
INJECTION INTRAMUSCULAR; INTRAVENOUS PRN
Status: DISCONTINUED | OUTPATIENT
Start: 2022-10-03 | End: 2022-10-03 | Stop reason: HOSPADM

## 2022-10-03 RX ORDER — MIDAZOLAM HYDROCHLORIDE 1 MG/ML
INJECTION INTRAMUSCULAR; INTRAVENOUS PRN
Status: DISCONTINUED | OUTPATIENT
Start: 2022-10-03 | End: 2022-10-03 | Stop reason: SDUPTHER

## 2022-10-03 RX ORDER — ONDANSETRON 2 MG/ML
4 INJECTION INTRAMUSCULAR; INTRAVENOUS
Status: DISCONTINUED | OUTPATIENT
Start: 2022-10-03 | End: 2022-10-03 | Stop reason: HOSPADM

## 2022-10-03 RX ORDER — FENTANYL CITRATE 50 UG/ML
INJECTION, SOLUTION INTRAMUSCULAR; INTRAVENOUS PRN
Status: DISCONTINUED | OUTPATIENT
Start: 2022-10-03 | End: 2022-10-03 | Stop reason: SDUPTHER

## 2022-10-03 RX ORDER — SODIUM CHLORIDE, SODIUM LACTATE, POTASSIUM CHLORIDE, CALCIUM CHLORIDE 600; 310; 30; 20 MG/100ML; MG/100ML; MG/100ML; MG/100ML
INJECTION, SOLUTION INTRAVENOUS CONTINUOUS PRN
Status: DISCONTINUED | OUTPATIENT
Start: 2022-10-03 | End: 2022-10-03 | Stop reason: SDUPTHER

## 2022-10-03 RX ADMIN — GLIPIZIDE 10 MG: 10 TABLET ORAL at 10:21

## 2022-10-03 RX ADMIN — BUSPIRONE HYDROCHLORIDE 10 MG: 10 TABLET ORAL at 10:16

## 2022-10-03 RX ADMIN — GLIPIZIDE 10 MG: 10 TABLET ORAL at 16:51

## 2022-10-03 RX ADMIN — GABAPENTIN 300 MG: 300 CAPSULE ORAL at 20:07

## 2022-10-03 RX ADMIN — BUSPIRONE HYDROCHLORIDE 10 MG: 10 TABLET ORAL at 14:32

## 2022-10-03 RX ADMIN — CELECOXIB 100 MG: 100 CAPSULE ORAL at 20:07

## 2022-10-03 RX ADMIN — Medication 2 G: at 07:39

## 2022-10-03 RX ADMIN — MAGNESIUM GLUCONATE 500 MG ORAL TABLET 400 MG: 500 TABLET ORAL at 10:23

## 2022-10-03 RX ADMIN — FENTANYL CITRATE 25 MCG: 50 INJECTION, SOLUTION INTRAMUSCULAR; INTRAVENOUS at 07:32

## 2022-10-03 RX ADMIN — DESMOPRESSIN ACETATE 40 MG: 0.2 TABLET ORAL at 20:07

## 2022-10-03 RX ADMIN — OXYCODONE 10 MG: 5 TABLET ORAL at 18:41

## 2022-10-03 RX ADMIN — SERTRALINE 50 MG: 50 TABLET, FILM COATED ORAL at 10:23

## 2022-10-03 RX ADMIN — CYCLOBENZAPRINE 10 MG: 10 TABLET, FILM COATED ORAL at 20:06

## 2022-10-03 RX ADMIN — GABAPENTIN 300 MG: 300 CAPSULE ORAL at 10:20

## 2022-10-03 RX ADMIN — PANTOPRAZOLE SODIUM 40 MG: 40 TABLET, DELAYED RELEASE ORAL at 10:23

## 2022-10-03 RX ADMIN — FUROSEMIDE 10 MG: 20 TABLET ORAL at 10:19

## 2022-10-03 RX ADMIN — MIDAZOLAM 1 MG: 1 INJECTION INTRAMUSCULAR; INTRAVENOUS at 07:40

## 2022-10-03 RX ADMIN — HYDROMORPHONE HYDROCHLORIDE 0.5 MG: 1 INJECTION, SOLUTION INTRAMUSCULAR; INTRAVENOUS; SUBCUTANEOUS at 08:05

## 2022-10-03 RX ADMIN — ACETAMINOPHEN 1000 MG: 500 TABLET ORAL at 22:01

## 2022-10-03 RX ADMIN — ROPINIROLE HYDROCHLORIDE 2 MG: 1 TABLET, FILM COATED ORAL at 20:07

## 2022-10-03 RX ADMIN — PANTOPRAZOLE SODIUM 40 MG: 40 TABLET, DELAYED RELEASE ORAL at 16:51

## 2022-10-03 RX ADMIN — MIDAZOLAM 1 MG: 1 INJECTION INTRAMUSCULAR; INTRAVENOUS at 07:32

## 2022-10-03 RX ADMIN — OXYCODONE 10 MG: 5 TABLET ORAL at 22:54

## 2022-10-03 RX ADMIN — ACETAMINOPHEN 1000 MG: 500 TABLET ORAL at 05:09

## 2022-10-03 RX ADMIN — LISINOPRIL 40 MG: 20 TABLET ORAL at 10:22

## 2022-10-03 RX ADMIN — BUSPIRONE HYDROCHLORIDE 10 MG: 10 TABLET ORAL at 20:07

## 2022-10-03 RX ADMIN — HYDROMORPHONE HYDROCHLORIDE 0.5 MG: 1 INJECTION, SOLUTION INTRAMUSCULAR; INTRAVENOUS; SUBCUTANEOUS at 08:20

## 2022-10-03 RX ADMIN — CYCLOBENZAPRINE 10 MG: 10 TABLET, FILM COATED ORAL at 05:09

## 2022-10-03 RX ADMIN — GABAPENTIN 300 MG: 300 CAPSULE ORAL at 14:32

## 2022-10-03 RX ADMIN — FENTANYL CITRATE 25 MCG: 50 INJECTION, SOLUTION INTRAMUSCULAR; INTRAVENOUS at 07:46

## 2022-10-03 RX ADMIN — SODIUM CHLORIDE, POTASSIUM CHLORIDE, SODIUM LACTATE AND CALCIUM CHLORIDE: 600; 310; 30; 20 INJECTION, SOLUTION INTRAVENOUS at 07:30

## 2022-10-03 RX ADMIN — FENTANYL CITRATE 25 MCG: 50 INJECTION, SOLUTION INTRAMUSCULAR; INTRAVENOUS at 07:53

## 2022-10-03 RX ADMIN — ACETAMINOPHEN 1000 MG: 500 TABLET ORAL at 14:37

## 2022-10-03 RX ADMIN — FENTANYL CITRATE 25 MCG: 50 INJECTION, SOLUTION INTRAMUSCULAR; INTRAVENOUS at 07:41

## 2022-10-03 RX ADMIN — OXYCODONE 10 MG: 5 TABLET ORAL at 14:37

## 2022-10-03 RX ADMIN — SODIUM CHLORIDE, PRESERVATIVE FREE 10 ML: 5 INJECTION INTRAVENOUS at 20:07

## 2022-10-03 RX ADMIN — CELECOXIB 100 MG: 100 CAPSULE ORAL at 10:17

## 2022-10-03 ASSESSMENT — PAIN DESCRIPTION - ORIENTATION
ORIENTATION: LEFT
ORIENTATION: LEFT;LOWER
ORIENTATION: LEFT
ORIENTATION: LOWER;LEFT
ORIENTATION: LEFT
ORIENTATION: LOWER;LEFT

## 2022-10-03 ASSESSMENT — PAIN SCALES - GENERAL
PAINLEVEL_OUTOF10: 0
PAINLEVEL_OUTOF10: 7
PAINLEVEL_OUTOF10: 9
PAINLEVEL_OUTOF10: 9
PAINLEVEL_OUTOF10: 0
PAINLEVEL_OUTOF10: 7
PAINLEVEL_OUTOF10: 9
PAINLEVEL_OUTOF10: 0
PAINLEVEL_OUTOF10: 9
PAINLEVEL_OUTOF10: 7
PAINLEVEL_OUTOF10: 9
PAINLEVEL_OUTOF10: 9

## 2022-10-03 ASSESSMENT — PAIN DESCRIPTION - LOCATION
LOCATION: BACK
LOCATION: OTHER (COMMENT)
LOCATION: BACK
LOCATION: BACK
LOCATION: HIP;LEG

## 2022-10-03 ASSESSMENT — PAIN DESCRIPTION - DESCRIPTORS
DESCRIPTORS: SHOOTING
DESCRIPTORS: ACHING;DISCOMFORT

## 2022-10-03 ASSESSMENT — PAIN DESCRIPTION - PAIN TYPE: TYPE: ACUTE PAIN

## 2022-10-03 NOTE — OP NOTE
Operative Note      Patient: Lawson Tinajero  YOB: 1960  MRN: [id number]    Date of Procedure: 10/3/2022    Reason for surgery:SI joint disease left  Pre-Op Diagnosis: Chronic left SI joint pain [  M53.3, G89.29]    Post-Op Diagnosis:  same       Procedure(s):  LEFT SI JOINT INJECTION    Surgeon(s):  Marcos Awad MD    Assistant:   * No surgical staff found *    Anesthesia: Choice    Estimated Blood Loss (mL): minimal    Complications: None, Other: none    Specimens:   * No specimens in log *    Implants:  * No implants in log *      Drains: * No LDAs found *    Findings: si joint filled well with dye    Detailed Description of Procedure:   Under sterile conditions with fluro guidance 2cc lidocaine and 2cc decadron was injected into left SI joint    Electronically signed by Marcos Awad MD on 10/3/2022 at 7:54 AM

## 2022-10-03 NOTE — PLAN OF CARE
Problem: Chronic Conditions and Co-morbidities  Goal: Patient's chronic conditions and co-morbidity symptoms are monitored and maintained or improved  10/2/2022 2339 by Atif Barakat RN  Outcome: Progressing  10/2/2022 1343 by Chacho Weems RN  Outcome: Progressing     Problem: Pain  Goal: Verbalizes/displays adequate comfort level or baseline comfort level  10/2/2022 2339 by Atif Barakat RN  Outcome: Progressing  10/2/2022 1343 by Chacho Weems RN  Outcome: Progressing     Problem: Safety - Adult  Goal: Free from fall injury  10/2/2022 2339 by Atif Barakat RN  Outcome: Progressing  10/2/2022 1343 by Chacho Weems RN  Outcome: Progressing     Problem: ABCDS Injury Assessment  Goal: Absence of physical injury  10/2/2022 2339 by Atif Barakat RN  Outcome: Progressing  10/2/2022 1343 by Chacho Weems RN  Outcome: Progressing

## 2022-10-03 NOTE — ANESTHESIA POSTPROCEDURE EVALUATION
Department of Anesthesiology  Postprocedure Note    Patient: Brook Vega  MRN: [id number]  YOB: 1960  Date of evaluation: 10/3/2022      Procedure Summary     Date: 10/03/22 Room / Location: [address]    Anesthesia Start: 0730 Anesthesia Stop: 8540    Procedure: LEFT SI JOINT INJECTION (Left: Back) Diagnosis:       Chronic left SI joint pain      (Chronic left SI joint pain [M53.3, G89.29])    Surgeons: Doroteo Hooks MD Responsible Provider: Shanae Prieto MD    Anesthesia Type: MAC ASA Status: 3          Anesthesia Type: No value filed.     Elise Phase I:      Elise Phase II: Elise Score: 9      Anesthesia Post Evaluation    Patient location during evaluation: bedside  Patient participation: complete - patient participated  Level of consciousness: awake  Airway patency: patent  Nausea & Vomiting: no nausea and no vomiting  Complications: no  Cardiovascular status: blood pressure returned to baseline  Respiratory status: acceptable  Hydration status: euvolemic  Comments: /75   Pulse 78   Temp 97.5 °F (36.4 °C) (Oral)   Resp 16   Ht 5' 3\" (1.6 m)   Wt 190 lb (86.2 kg)   SpO2 94%   BMI 33.66 kg/m²

## 2022-10-03 NOTE — PROGRESS NOTES
[facility]  CDU / OBSERVATION eNCOUnter  Attending NOte       I performed a history and physical examination of the patient and discussed management with the resident. I reviewed the residents note and agree with the documented findings and plan of care. Any areas of disagreement are noted on the chart. I was personally present for the key portions of any procedures. I have documented in the chart those procedures where I was not present during the key portions. I have reviewed the nurses notes. I agree with the chief complaint, past medical history, past surgical history, allergies, medications, social and family history as documented unless otherwise noted below. The Family history, social history, and ROS are effectively unchanged since admission unless noted elsewhere in the chart. S/p left SI joint injection with Dr Ja Merrill from neurosurgery this AM. Feeling well now. No complaint of pain, but she has not been out of bed yet. We will check on her this afternoon.      Adriane Villalpando MD  Attending Emergency  Physician

## 2022-10-03 NOTE — PROGRESS NOTES
[facility] ([facility])  Occupational Therapy Not Seen Note    DATE: 10/3/2022    NAME: Arjun Gill  MRN: [id number]   : 1960      Patient not seen this date for Occupational Therapy due to:    Surgery/Procedure: L SI joint injection    Next Scheduled Treatment: PM or 10/04/2022    Electronically signed by Merdis Severance, OTR/L on 10/3/2022 at 9:05 AM

## 2022-10-03 NOTE — CARE COORDINATION
Transitional planning    Spoke to patient about plan for discharge. Plan is home with  she has a ride.

## 2022-10-03 NOTE — PROGRESS NOTES
Physical Therapy        Physical Therapy Cancel Note      DATE: 10/3/2022    NAME: Sunny Poe  MRN: [id number]   : 1960      Patient not seen this date for Physical Therapy due to:    Surgery/Procedure: LEFT SI JOINT INJECTION      Electronically signed by Daryle Code, PTA on 10/3/2022 at 8:10 AM

## 2022-10-03 NOTE — PROGRESS NOTES
Neurosurgery Post op Progress Note      POD# 0    s/p left SI joint injection     SUBJECTIVE:      Patient presents with chronic left SI joint pain       OBJECTIVE      Physical exam   VITALS:    Vitals:    10/03/22 0845   BP: (!) 102/51   Pulse: 79   Resp: 16   Temp:    SpO2: 94%     INTAKE:    Intake/Output Summary (Last 24 hours) at 10/3/2022 0905  Last data filed at 10/3/2022 0845  Gross per 24 hour   Intake 450 ml   Output --   Net 450 ml            Neurological exam   alert, oriented x3, affect appropriate, no focal neurological deficits, and moves all extremities well  alert, oriented, normal speech, no focal findings or movement disorder noted.       Wound   Post op wound:    Dressing is clean/dry/intact with no signs of drainage     ASSESSMENT AND PLAN    65 y.o. female status post left SI joint injection post op day # 0    - Activity: As tolerated  - DVT prophylaxis: SCDs and lovenox  - Diet: Advance as tolerated      Electronically signed by TIGIST Tom on 10/3/2022 at 9:05 AM

## 2022-10-03 NOTE — ANESTHESIA PRE PROCEDURE
Department of Anesthesiology  Preprocedure Note       Name:  Bard Smith   Age:  65 y.o.  :  1960                                          MRN:  [id number]         Date:  10/3/2022      Surgeon: Octavio Coffey):  Stephanie Baez MD    Procedure: Procedure(s):  LEFT SI JOINT INJECTION C-ARM, PRONE ON LENKA TABLE    Medications prior to admission:   Prior to Admission medications    Medication Sig Start Date End Date Taking? Authorizing Provider   sertraline (ZOLOFT) 50 MG tablet TAKE ONE TABLET BY MOUTH EVERY MORNING 22   Gloria Childers MD   TRULICITY 1.5 QX/9.2[facility] INJECT THE CONTENTS OF ONE SYRINGE UNDER THE SKIN ONCE WEEKLY 22   Gloria Childers MD   celecoxib (CELEBREX) 100 MG capsule TAKE 1 CAPSULE BY MOUTH 2 TIMES A DAY 22   Tashi Cantrell MD   SYNJARDY 5-1000 MG TABS TAKE 1 TABLET BY MOUTH 2 TIMES A DAY 22   Gloria Childers MD   lisinopril (PRINIVIL;ZESTRIL) 40 MG tablet Take 1 tablet by mouth daily 22   Kari Small MD   busPIRone (BUSPAR) 10 MG tablet TAKE 1 TABLET BY MOUTH 3 TIMES A DAY 22   Sangeeta Gonzalez MD   Elastic Bandages & Supports (ELBOW BRACE) MISC 1 each by Does not apply route at bedtime Please dispense left cubital tunnel brace 22  Crespo Patella, APRN - CNP   ASPIRIN LOW DOSE 81 MG EC tablet TAKE 1 TABLET BY MOUTH ONE TIME A DAY 8/10/22   Gloria Childers MD   alendronate (FOSAMAX) 70 MG tablet Take 1 tablet by mouth every 7 days 22   Angus Mejias MD   oxyCODONE-acetaminophen (PERCOCET) 5-325 MG per tablet Take by mouth every 4 hours as needed for Pain.     Historical Provider, MD   atorvastatin (LIPITOR) 40 MG tablet Take 1 tablet by mouth at bedtime 7/15/22   Leon Patient, MD   rOPINIRole (REQUIP) 1 MG tablet Take 2 tablets by mouth nightly 7/15/22   Leon Barrera, MD   acetaminophen (TYLENOL 8 HOUR) 650 MG extended release tablet Take 1 tablet by mouth every 8 hours as needed for Pain 7/15/22   Leon Barrera MD miconazole (ZEASORB-AF) 2 % powder Apply 43 g topically as needed for Itching Apply topically 2 times daily. 7/15/22   Anastacia Canchola MD   furosemide (LASIX) 20 MG tablet Take 0.5 tablets by mouth every other day 7/15/22   Kala White MD   diclofenac sodium (VOLTAREN) 1 % GEL Apply 2 g topically in the morning and 2 g at noon and 2 g in the evening and 2 g before bedtime. 7/15/22   Anastacia Canchola MD   blood glucose test strips (PRODIGY NO CODING BLOOD GLUC) strip Use to test once daily and as needed as directed by provider. Please dispense Prodigy brand. 7/12/22   Bea Winkler MD   blood glucose monitor kit and supplies Dispense sufficient amount for indicated testing frequency plus additional to accommodate PRN testing needs. Dispense all needed supplies to include: monitor, strips, lancing device, lancets, control solutions, alcohol swabs. Haroldo Brand 7/6/22   Bea Winkler MD   Magnesium Oxide 400 MG CAPS Take 400 mg by mouth daily 7/6/22   Mo'Men Racheal Moreno MD   gabapentin (NEURONTIN) 300 MG capsule Take 1 capsule by mouth 3 times daily for 30 days.  6/9/22 7/9/22  Jones Packer MD   glipiZIDE (GLUCOTROL) 10 MG tablet Take 1 tab bid 1/13/22   Bea Winkler MD   omeprazole (PRILOSEC) 20 MG delayed release capsule Take 1 capsule by mouth 2 times daily 7/26/21   Bea Winkler MD   albuterol sulfate HFA (PROVENTIL HFA) 108 (90 Base) MCG/ACT inhaler Inhale 2 puffs into the lungs every 4 hours as needed for Wheezing 4/20/21 4/20/22  Bea Winkler MD   repaglinide (PRANDIN) 1 MG tablet TAKE 1 TABLET BY MOUTH 3 TIMES A DAY BEFORE MEALS 12/8/20   Bea Winkler MD   bimatoprost (LUMIGAN) 0.01 % SOLN ophthalmic drops Place 1 drop into both eyes nightly 4/26/18   Historical Provider, MD   PRODIGY LANCETS 28G MISC 1 Bottle by Does not apply route three times daily 4/26/19   Bea Winkler MD   Blood Glucose Monitoring Suppl (PRODIGY AUTOCODE BLOOD GLUCOSE) w/Device KIT 1 Device by Does not apply route 3 times daily 4/26/19   Elana Le MD       Current medications:    No current facility-administered medications for this visit. No current outpatient medications on file.      Facility-Administered Medications Ordered in Other Visits   Medication Dose Route Frequency Provider Last Rate Last Admin    glucose chewable tablet 16 g  4 tablet Oral PRN Wesley Jackson MD        dextrose bolus 10% 125 mL  125 mL IntraVENous PRN Wesley Jackson MD        Or    dextrose bolus 10% 250 mL  250 mL IntraVENous PRN Wesley Jackson MD        glucagon (rDNA) injection 1 mg  1 mg SubCUTAneous PRN Wesley Jackson MD        dextrose 10 % infusion   IntraVENous Continuous PRN Wesley Jackson MD        cefTRIAXone (ROCEPHIN) 1,000 mg in sterile water 10 mL IV syringe  1,000 mg IntraVENous Once Amrita Franks MD        cyclobenzaprine (FLEXERIL) tablet 10 mg  10 mg Oral TID PRN Don Duster, DO   10 mg at 10/03/22 0509    Empagliflozin-metFORMIN HCl 5-1000 MG TABS 1 tablet (Patient Supplied)  1 tablet Oral BID Don Duster, DO   1 tablet at 10/02/22 2000    oxyCODONE (ROXICODONE) immediate release tablet 10 mg  10 mg Oral Q4H PRN Don Duster, DO   10 mg at 10/02/22 1959    lidocaine 4 % external patch 1 patch  1 patch TransDERmal Daily Bella King Sampair, DO   1 patch at 10/02/22 0827    atorvastatin (LIPITOR) tablet 40 mg  40 mg Oral Nightly Bernie Lucasir, DO   40 mg at 10/02/22 1959    busPIRone (BUSPAR) tablet 10 mg  10 mg Oral TID Bella King Sampair, DO   10 mg at 10/02/22 1958    celecoxib (CELEBREX) capsule 100 mg  100 mg Oral BID Bella King Sampair, DO   100 mg at 10/02/22 1958    furosemide (LASIX) tablet 10 mg  10 mg Oral Every Other Day Bernie PAGAN Sampair, DO        gabapentin (NEURONTIN) capsule 300 mg  300 mg Oral TID Bella King Sampair, DO   300 mg at 10/02/22 1959    glipiZIDE (GLUCOTROL) tablet 10 mg  10 mg Oral BID AC Bernie PAGAN Sampair, DO   10 mg at 10/02/22 1555    lisinopril (PRINIVIL;ZESTRIL) tablet 40 mg  40 mg Oral Daily Bernie M Sampair, DO   40 mg at 10/02/22 0826    magnesium oxide (MAG-OX) tablet 400 mg  400 mg Oral Daily Lázaro Bile Sampair, DO   400 mg at 10/02/22 0825    pantoprazole (PROTONIX) tablet 40 mg  40 mg Oral BID AC Bernie M Sampair, DO   40 mg at 10/02/22 1555    rOPINIRole (REQUIP) tablet 2 mg  2 mg Oral Nightly Lázaro Bile Sampair, DO   2 mg at 10/02/22 1959    sertraline (ZOLOFT) tablet 50 mg  50 mg Oral Daily Bernie M Sampair, DO   50 mg at 10/02/22 2282    sodium chloride flush 0.9 % injection 5-40 mL  5-40 mL IntraVENous 2 times per day Melene Deandre, DO   10 mL at 10/02/22 2001    sodium chloride flush 0.9 % injection 5-40 mL  5-40 mL IntraVENous PRN Lázaro Bile Sampair, DO        0.9 % sodium chloride infusion   IntraVENous PRN Lázaro Bile Sampair, DO        ondansetron (ZOFRAN-ODT) disintegrating tablet 4 mg  4 mg Oral Q8H PRN Bernie M Sampair, DO        Or    ondansetron [facility]) injection 4 mg  4 mg IntraVENous Q6H PRN Bernie M Sampair, DO        polyethylene glycol (GLYCOLAX) packet 17 g  17 g Oral Daily PRN Melene Deandre, DO        acetaminophen (TYLENOL) tablet 1,000 mg  1,000 mg Oral 3 times per day Melene Deandre, DO   1,000 mg at 10/03/22 0489       Allergies:     Allergies   Allergen Reactions    Codeine Nausea And Vomiting       Problem List:    Patient Active Problem List   Diagnosis Code    Cervical spondylosis M47.812    Type 2 diabetes mellitus without complication, without long-term current use of insulin ([facility]) E11.9    Hypertension I10    Abnormal auditory perception H93.299    Eustachian tube dysfunction H69.80    Chronic serous otitis media H65.20    Nasal polyps J33.9    Nasal congestion R09.81    Osteoarthritis of left knee M17.12    Osteoarthritis of right knee M17.11    DIEGO (nonalcoholic steatohepatitis) K75.81    Vitamin D deficiency E55.9    Iron deficiency anemia D50.9    Dyslipidemia E78.5    Diabetes mellitus type 2, uncontrolled [id number]    Left hip pain M25.552    Trochanteric bursitis M70.60    Fatigue R53.83    Daytime somnolence R40.0    Snoring R06.83    Chronic left shoulder pain M25.512, G89.29    Restless legs syndrome G25.81    Generalized anxiety disorder F41.1    Primary osteoarthritis, left shoulder M19.012    Tendinopathy of left shoulder M67.912    Adhesive capsulitis of left shoulder M75.02    Lumbar radiculopathy M54.16    Back pain M54.9    Toxic metabolic encephalopathy X37.3    Class 2 obesity in adult E66.9    Ulnar neuropathy at elbow, right G56.21    Leg edema R60.0    Recurrent major depressive disorder (HCC) F33.9    Dermatitis L30.9    Retrolisthesis of vertebrae M43.10    Bilateral carpal tunnel syndrome G56.03    Intention tremor G25.2    Sciatica M54.30    Back pain with radiation M54.9       Past Medical History:        Diagnosis Date    Cervical spondylosis 2009    c-4 c-5, c-5 c-6, c-6 c-7    Generalized anxiety disorder 2020    Hyperlipidemia     Hypertension     Lumbar radiculopathy     DIEGO (nonalcoholic steatohepatitis) 10/12/2014    Obesity     Osteoarthritis of left knee 2014    Recurrent major depressive disorder ([facility] [address] 07/15/2022    Restless legs syndrome     TIA (transient ischemic attack)     no residual symptoms    Type II or unspecified type diabetes mellitus without mention of complication, not stated as uncontrolled        Past Surgical History:        Procedure Laterality Date     SECTION      [facility]OSCOPY  2012    [facility]    HYSTERECTOMY, TOTAL ABDOMINAL (CERVIX REMOVED)      Polymenorrhagia    SHOULDER SURGERY Left 2021    SHOULDER MANIPULATION WITH PRE OP INTERSCALENE BLOCK and intraop injection performed by Rosetta Lyman DO at [address] [place] [address]  2012    [facility]       Social History:    Social History     Tobacco Use  Smoking status: Never    Smokeless tobacco: Never   Substance Use Topics    Alcohol use: Yes     Alcohol/week: 0.0 standard drinks     Comment: rarely/ 4 times a year                                Counseling given: Not Answered      Vital Signs (Current): There were no vitals filed for this visit.                                            BP Readings from Last 3 Encounters:   10/02/22 139/83   09/22/22 127/86   08/25/22 (!) 143/84       NPO Status:                                                                                 BMI:   Wt Readings from Last 3 Encounters:   09/30/22 190 lb 14.7 oz (86.6 kg)   09/22/22 188 lb (85.3 kg)   08/25/22 188 lb 12.8 oz (85.6 kg)     There is no height or weight on file to calculate BMI.    CBC:   Lab Results   Component Value Date/Time    WBC 10.9 06/13/2022 05:50 AM    RBC 3.59 06/13/2022 05:50 AM    HGB 10.3 06/13/2022 05:50 AM    HCT 33.7 06/13/2022 05:50 AM    MCV 93.9 06/13/2022 05:50 AM    RDW 13.4 06/13/2022 05:50 AM     06/13/2022 05:50 AM       CMP:   Lab Results   Component Value Date/Time     07/06/2022 03:40 PM    K 4.6 07/06/2022 03:40 PM     07/06/2022 03:40 PM    CO2 25 07/06/2022 03:40 PM    BUN 15 07/06/2022 03:40 PM    CREATININE 1.00 07/06/2022 03:40 PM    GFRAA >60 07/06/2022 03:40 PM    LABGLOM 56 07/06/2022 03:40 PM    GLUCOSE 114 07/06/2022 03:40 PM    GLUCOSE 137 05/19/2012 12:05 PM    PROT 7.5 05/31/2022 04:54 PM    CALCIUM 9.3 07/06/2022 03:40 PM    BILITOT 0.31 05/31/2022 04:54 PM    ALKPHOS 84 05/31/2022 04:54 PM    AST 27 05/31/2022 04:54 PM    ALT 36 05/31/2022 04:54 PM       POC Tests:   Recent Labs     10/02/22  1957   POCGLU 192*       Coags:   Lab Results   Component Value Date/Time    PROTIME 10.0 07/23/2013 06:39 PM    INR 1.0 07/23/2013 06:39 PM    APTT 25.7 07/23/2013 06:39 PM       HCG (If Applicable): No results found for: PREGTESTUR, PREGSERUM, HCG, HCGQUANT     ABGs: No results found for: PHART, PO2ART, DAV9MYX, DNA8RSC, BEART, B2WSBQWA     Type & Screen (If Applicable):  No results found for: LABABO, LABRH    Drug/Infectious Status (If Applicable):  Lab Results   Component Value Date/Time    HEPCAB NONREACTIVE 01/15/2014 09:54 AM       COVID-19 Screening (If Applicable):   Lab Results   Component Value Date/Time    COVID19 Not Detected 09/30/2022 08:19 PM    COVID19 DETECTED 12/06/2021 08:20 AM    COVID19 Not Detected 12/16/2020 07:45 PM         Anesthesia Evaluation  Patient summary reviewed and Nursing notes reviewed no history of anesthetic complications:   Airway: Mallampati: II  TM distance: >3 FB   Neck ROM: full  Mouth opening: > = 3 FB   Dental: normal exam         Pulmonary:Negative Pulmonary ROS and normal exam                               Cardiovascular:    (+) hypertension:,       ECG reviewed      Echocardiogram reviewed               ROS comment: -EKG - SR @ 75     Neuro/Psych:   (+) TIA, psychiatric history:depression/anxiety              ROS comment: -TIA - 10 YEARS AGO  -DDD - CERVICAL, POOR NECK MOBILITY GI/Hepatic/Renal:   (+) GERD:, liver disease:, morbid obesity         ROS comment: -NON-ALCOHOLIC STEATOHEPATITIS. Endo/Other:    (+) DiabetesType II DM, , : arthritis:., .                  ROS comment: -NPO AFTER MIDNIGHT  -ALLERGIES - CODEINE Abdominal:             Vascular: negative vascular ROS. Other Findings:             Anesthesia Plan      MAC     ASA 3       Induction: intravenous. MIPS: Postoperative opioids intended and Prophylactic antiemetics administered. Anesthetic plan and risks discussed with patient. Plan discussed with CRNA.                     Jennifer Manriquez MD   10/3/2022

## 2022-10-03 NOTE — PROGRESS NOTES
Physical Therapy  Facility/Department: [address] ORTHO/MED SURG  Physical Therapy Daily Treatment Note    Name: Gil Jackson  : 1960  MRN: [id number]  Date of Service: 10/3/2022    Discharge Recommendations:  Patient would benefit from continued therapy after discharge   PT Equipment Recommendations  Equipment Needed: No      Patient Diagnosis(es): The encounter diagnosis was Sciatica of left side. Past Medical History:  has a past medical history of Cervical spondylosis, Generalized anxiety disorder, Hyperlipidemia, Hypertension, Lumbar radiculopathy, DIEGO (nonalcoholic steatohepatitis), Obesity, Osteoarthritis of left knee, Recurrent major depressive disorder ([facility] [address] Restless legs syndrome, TIA (transient ischemic attack), and Type II or unspecified type diabetes mellitus without mention of complication, not stated as uncontrolled. Past Surgical History:  has a past surgical history that includes Upper gastrointestinal endoscopy (2012); Colonoscopy (2012);  section; Hysterectomy, total abdominal (); shoulder surgery (Left, 2021); and Back Injection (Left, 10/03/2022). Assessment   Body Structures, Functions, Activity Limitations Requiring Skilled Therapeutic Intervention: Decreased functional mobility ; Decreased strength;Decreased endurance  Assessment: Pt performed bed mobility and transfers with CGA. Pt ambulated 50ft with RW and CGA throughout for safety, limited by pain. Recommending continued PT to address deficits and maximize safety and independence with mobility. Therapy Prognosis: Good  Requires PT Follow-Up: Yes  Activity Tolerance  Activity Tolerance: Patient limited by fatigue;Patient limited by endurance; Patient limited by pain     Plan   Physcial Therapy Plan  General Plan:  (5-6x/week)  Current Treatment Recommendations: Strengthening, Functional mobility training, Transfer training, Stair training, Gait training, Safety education & training  Safety Devices  Type of Devices: Nurse notified, Left in bed, Call light within reach, Gait belt  Restraints  Restraints Initially in Place: No     Restrictions  Restrictions/Precautions  Restrictions/Precautions: Up as Tolerated  Position Activity Restriction  Other position/activity restrictions: LEFT SI JOINT INJECTION (Left: Back) on 10/3. Subjective   General  Chart Reviewed: Yes  Patient assessed for rehabilitation services?: Yes  Response To Previous Treatment: Patient with no complaints from previous session. Family / Caregiver Present: No  Follows Commands: Within Functional Limits  General Comment  Comments: Pt returned to supine in bed at end of session with call light in reach. Subjective  Subjective: Pt and RN agreeable to PT this afternoon. Pt supine in bed upon arrival with c/o 8/10 pain in L posterior hip with mobility. Pt pleasant and cooperative throughout session. Cognition   Orientation  Overall Orientation Status: Within Functional Limits  Cognition  Overall Cognitive Status: WFL     Objective   Bed mobility  Supine to Sit: Contact guard assistance  Sit to Supine: Contact guard assistance  Scooting: Contact guard assistance  Bed Mobility Comments: HOB slightly elevated throughout bed mobility. Transfers  Sit to Stand: Contact guard assistance  Stand to Sit: Contact guard assistance  Comment: RW used for transfers, good hand placement throughout. Ambulation  Surface: Level tile  Device: Rolling Walker  Assistance: Contact guard assistance  Gait Deviations: Slow Kaelyn;Decreased step length  Distance: 50ft  Comments: Pt ambulated slow and steady with no noted LOB. Pt deferring further ambulation d/t just getting SI joint injection and wanting to take it easy. More Ambulation?: No  Stairs/Curb  Stairs?: No     Balance  Posture: Good  Sitting - Static: Good  Sitting - Dynamic: Fair  Standing - Static: Good  Standing - Dynamic: Fair  Comments: RW used to assess standing balance.   Exercise Treatment: Pt deferring exercises d/t fatigue.       AM-PAC Score  AM-PAC Inpatient Mobility Raw Score : 17 (10/03/22 1503)  AM-PAC Inpatient T-Scale Score : 42.13 (10/03/22 1503)  Mobility Inpatient CMS 0-100% Score: 50.57 (10/03/22 1503)  Mobility Inpatient CMS G-Code Modifier : CK (10/03/22 1503)      Goals  Short Term Goals  Time Frame for Short Term Goals: 10 visits  Short Term Goal 1: transfers with SBA  Short Term Goal 2: amb 150 ft with a RW x SBA  Short Term Goal 3: ascend/descend 4 steps with SBA  Short Term Goal 4: 20 min strengthening exercise program x SBA       Education  Patient Education  Education Given To: Patient  Education Provided: Role of Therapy;Plan of Care  Education Method: Verbal  Barriers to Learning: None  Education Outcome: Verbalized understanding      Therapy Time   Individual Concurrent Group Co-treatment   Time In 1406         Time Out 1431         Minutes 25         Timed Code Treatment Minutes: Kelli 28, PTA

## 2022-10-03 NOTE — PROGRESS NOTES
OBS/CDU   RESIDENT NOTE      Patients PCP is:  Blayne Elizalde MD        SUBJECTIVE      No acute events overnight. Has been able to tolerate a full diet without nausea or vomiting. The patient is urinating on his own and is passing flatus. Denies fever, chills, nausea, vomiting, chest pain, shortness of breath, abdominal pain, focal weakness, numbness, tingling, urinary/bowel symptoms, vision changes, visual hallucinations, or headache. This morning the patient was in the OR w/ Dr. Star Brown for sacroiliac injection. Patient was resting comfortably in bed in no acute distress when seen by provider. Patient had no pain in her left leg but was sleepy and still recovering from anesthesia. She was on 2 L nasal cannula with no shortness of breath. PHYSICAL EXAM      General: NAD, AO X 3  Heent: EMOI, PERRL  Neck: SUPPLE, NO JVD  Cardiovascular: RRR, S1S2  Pulmonary: CTAB, NO SOB  Abdomen: SOFT, NTTP, ND, +BS  Extremities: +2/4 PULSES DISTAL, NO SWELLING  Neuro / Psych: NO NUMBNESS OR TINGLING, MENTATION AT BASELINE    PERTINENT TEST /EXAMS      I have reviewed all available laboratory results.     MEDICATIONS CURRENT   sodium chloride flush 0.9 % injection 5-40 mL, 2 times per day  sodium chloride flush 0.9 % injection 5-40 mL, PRN  0.9 % sodium chloride infusion, PRN  HYDROmorphone (DILAUDID) injection 0.25 mg, Q5 Min PRN  HYDROmorphone (DILAUDID) injection 0.5 mg, Q5 Min PRN  ondansetron (ZOFRAN) injection 4 mg, Once PRN  hydrALAZINE (APRESOLINE) injection 10 mg, Q15 Min PRN  [MAR Hold] glucose chewable tablet 16 g, PRN  [MAR Hold] dextrose bolus 10% 125 mL, PRN   Or  [MAR Hold] dextrose bolus 10% 250 mL, PRN  [MAR Hold] glucagon (rDNA) injection 1 mg, PRN  [MAR Hold] dextrose 10 % infusion, Continuous PRN  [MAR Hold] cefTRIAXone (ROCEPHIN) 1,000 mg in sterile water 10 mL IV syringe, Once  [MAR Hold] cyclobenzaprine (FLEXERIL) tablet 10 mg, TID PRN  [MAR Hold] Empagliflozin-metFORMIN HCl 5-1000 MG TABS 1 tablet (Patient Supplied), BID  [MAR Hold] oxyCODONE (ROXICODONE) immediate release tablet 10 mg, Q4H PRN  [MAR Hold] lidocaine 4 % external patch 1 patch, Daily  [MAR Hold] atorvastatin (LIPITOR) tablet 40 mg, Nightly  [MAR Hold] busPIRone (BUSPAR) tablet 10 mg, TID  [MAR Hold] celecoxib (CELEBREX) capsule 100 mg, BID  [MAR Hold] furosemide (LASIX) tablet 10 mg, Every Other Day  [MAR Hold] gabapentin (NEURONTIN) capsule 300 mg, TID  [MAR Hold] glipiZIDE (GLUCOTROL) tablet 10 mg, BID AC  [MAR Hold] lisinopril (PRINIVIL;ZESTRIL) tablet 40 mg, Daily  [MAR Hold] magnesium oxide (MAG-OX) tablet 400 mg, Daily  [MAR Hold] pantoprazole (PROTONIX) tablet 40 mg, BID AC  [MAR Hold] rOPINIRole (REQUIP) tablet 2 mg, Nightly  [MAR Hold] sertraline (ZOLOFT) tablet 50 mg, Daily  [MAR Hold] sodium chloride flush 0.9 % injection 5-40 mL, 2 times per day  [facility] Hold] sodium chloride flush 0.9 % injection 5-40 mL, PRN  [MAR Hold] 0.9 % sodium chloride infusion, PRN  [MAR Hold] ondansetron (ZOFRAN-ODT) disintegrating tablet 4 mg, Q8H PRN   Or  [MAR Hold] ondansetron (ZOFRAN) injection 4 mg, Q6H PRN  [MAR Hold] polyethylene glycol (GLYCOLAX) packet 17 g, Daily PRN  [MAR Hold] acetaminophen (TYLENOL) tablet 1,000 mg, 3 times per day      All medication charted and reviewed. CONSULTS      IP CONSULT TO NEUROSURGERY    ASSESSMENT/PLAN       Gadiel Pathak is a 65 y.o. female who presents with left lower back pain/sciatica pain. Neurosurgery consulted:  MRI lumbar spine without contrast:  Degenerative change most pronounced in the lower lumbar spine with mild right foraminal narrowing at L5-S1 and lateral recess narrowing that is worse on the right compared to the left. Status post sacroiliac joint injection under fluoroscopy. Per neurosurgery, if patient has not relief they will consider taking operative treatment for either L5-S1 disc rupture or perform S1 foraminal injection. Pending neurosurgery reassessment of patient.   Physical therapy consulted: They recommend 5 to 6 weeks of outpatient therapy for strengthening and functional mobility. Continue home medications and pain control  Monitor vitals, labs, and imaging  DISPO: pending consults and clinical improvement    --  Arnav Maceutant,   Emergency Medicine Resident Physician     This dictation was generated by voice recognition computer software. Although all attempts are made to edit the dictation for accuracy, there may be errors in the transcription that are not intended.

## 2022-10-04 ENCOUNTER — APPOINTMENT (OUTPATIENT)
Dept: GENERAL RADIOLOGY | Age: 62
DRG: 552 | End: 2022-10-04

## 2022-10-04 ENCOUNTER — HOSPITAL ENCOUNTER (OUTPATIENT)
Dept: PHYSICAL THERAPY | Age: 62
Setting detail: THERAPIES SERIES
Discharge: HOME | End: 2022-10-04

## 2022-10-04 ENCOUNTER — HOSPITAL ENCOUNTER (OUTPATIENT)
Dept: OCCUPATIONAL THERAPY | Age: 62
Setting detail: THERAPIES SERIES
Discharge: HOME | End: 2022-10-04

## 2022-10-04 ENCOUNTER — HOSPITAL ENCOUNTER (OUTPATIENT)
Dept: PREADMISSION TESTING | Age: 62
Discharge: HOME | End: 2022-10-04

## 2022-10-04 LAB
ABO/RH: NORMAL
ABSOLUTE EOS #: 0.07 K/UL (ref 0–0.44)
ABSOLUTE IMMATURE GRANULOCYTE: 0.08 K/UL (ref 0–0.3)
ABSOLUTE LYMPH #: 1.69 K/UL (ref 1.1–3.7)
ABSOLUTE MONO #: 1.18 K/UL (ref 0.1–1.2)
ALBUMIN SERPL-MCNC: 4.1 G/DL (ref 3.5–5.2)
ALBUMIN/GLOBULIN RATIO: 1.4 (ref 1–2.5)
ALP BLD-CCNC: 126 U/L (ref 35–104)
ALT SERPL-CCNC: 58 U/L (ref 5–33)
ANION GAP SERPL CALCULATED.3IONS-SCNC: 14 MMOL/L (ref 9–17)
ANTIBODY SCREEN: NEGATIVE
ARM BAND NUMBER: NORMAL
AST SERPL-CCNC: 35 U/L
BACTERIA: ABNORMAL
BASOPHILS # BLD: 0 % (ref 0–2)
BASOPHILS ABSOLUTE: <0.03 K/UL (ref 0–0.2)
BILIRUB SERPL-MCNC: 0.3 MG/DL (ref 0.3–1.2)
BILIRUBIN URINE: NEGATIVE
BUN BLDV-MCNC: 41 MG/DL (ref 8–23)
CALCIUM SERPL-MCNC: 9.3 MG/DL (ref 8.6–10.4)
CHLORIDE BLD-SCNC: 102 MMOL/L (ref 98–107)
CO2: 23 MMOL/L (ref 20–31)
COLOR: YELLOW
CREAT SERPL-MCNC: 1.24 MG/DL (ref 0.5–0.9)
EOSINOPHILS RELATIVE PERCENT: 1 % (ref 1–4)
EPITHELIAL CELLS UA: ABNORMAL /HPF (ref 0–5)
EXPIRATION DATE: NORMAL
GFR SERPL CREATININE-BSD FRML MDRD: 50 ML/MIN/1.73M2
GLUCOSE BLD-MCNC: 104 MG/DL (ref 70–99)
GLUCOSE BLD-MCNC: 152 MG/DL (ref 65–105)
GLUCOSE BLD-MCNC: 155 MG/DL (ref 65–105)
GLUCOSE BLD-MCNC: 62 MG/DL (ref 65–105)
GLUCOSE BLD-MCNC: 63 MG/DL (ref 65–105)
GLUCOSE BLD-MCNC: 77 MG/DL (ref 65–105)
GLUCOSE URINE: ABNORMAL
HCT VFR BLD CALC: 38 % (ref 36.3–47.1)
HEMOGLOBIN: 11.9 G/DL (ref 11.9–15.1)
IMMATURE GRANULOCYTES: 1 %
INR BLD: 0.9
KETONES, URINE: NEGATIVE
LEUKOCYTE ESTERASE, URINE: ABNORMAL
LYMPHOCYTES # BLD: 12 % (ref 24–43)
MCH RBC QN AUTO: 27.7 PG (ref 25.2–33.5)
MCHC RBC AUTO-ENTMCNC: 31.3 G/DL (ref 28.4–34.8)
MCV RBC AUTO: 88.4 FL (ref 82.6–102.9)
MONOCYTES # BLD: 8 % (ref 3–12)
NITRITE, URINE: NEGATIVE
NRBC AUTOMATED: 0 PER 100 WBC
PARTIAL THROMBOPLASTIN TIME: 20.7 SEC (ref 20.5–30.5)
PDW BLD-RTO: 13.8 % (ref 11.8–14.4)
PH UA: 5 (ref 5–8)
PLATELET # BLD: 379 K/UL (ref 138–453)
PMV BLD AUTO: 10.9 FL (ref 8.1–13.5)
POTASSIUM SERPL-SCNC: 4.8 MMOL/L (ref 3.7–5.3)
PROTEIN UA: NEGATIVE
PROTHROMBIN TIME: 9.4 SEC (ref 9.1–12.3)
RBC # BLD: 4.3 M/UL (ref 3.95–5.11)
SEG NEUTROPHILS: 78 % (ref 36–65)
SEGMENTED NEUTROPHILS ABSOLUTE COUNT: 11.61 K/UL (ref 1.5–8.1)
SODIUM BLD-SCNC: 139 MMOL/L (ref 135–144)
SPECIFIC GRAVITY UA: 1.02 (ref 1–1.03)
TOTAL PROTEIN: 7 G/DL (ref 6.4–8.3)
TURBIDITY: CLEAR
URINE HGB: NEGATIVE
UROBILINOGEN, URINE: NORMAL
WBC # BLD: 14.6 K/UL (ref 3.5–11.3)
WBC UA: ABNORMAL /HPF (ref 0–5)
YEAST: ABNORMAL

## 2022-10-04 PROCEDURE — 97110 THERAPEUTIC EXERCISES: CPT

## 2022-10-04 PROCEDURE — 36415 COLL VENOUS BLD VENIPUNCTURE: CPT

## 2022-10-04 PROCEDURE — 86901 BLOOD TYPING SEROLOGIC RH(D): CPT

## 2022-10-04 PROCEDURE — 81001 URINALYSIS AUTO W/SCOPE: CPT

## 2022-10-04 PROCEDURE — 86900 BLOOD TYPING SEROLOGIC ABO: CPT

## 2022-10-04 PROCEDURE — 97535 SELF CARE MNGMENT TRAINING: CPT

## 2022-10-04 PROCEDURE — 1200000000 HC SEMI PRIVATE

## 2022-10-04 PROCEDURE — 80053 COMPREHEN METABOLIC PANEL: CPT

## 2022-10-04 PROCEDURE — 6370000000 HC RX 637 (ALT 250 FOR IP): Performed by: PHYSICIAN ASSISTANT

## 2022-10-04 PROCEDURE — 2580000003 HC RX 258: Performed by: PHYSICIAN ASSISTANT

## 2022-10-04 PROCEDURE — 82947 ASSAY GLUCOSE BLOOD QUANT: CPT

## 2022-10-04 PROCEDURE — 97166 OT EVAL MOD COMPLEX 45 MIN: CPT

## 2022-10-04 PROCEDURE — 97116 GAIT TRAINING THERAPY: CPT

## 2022-10-04 PROCEDURE — APPSS30 APP SPLIT SHARED TIME 16-30 MINUTES: Performed by: PHYSICIAN ASSISTANT

## 2022-10-04 PROCEDURE — 93005 ELECTROCARDIOGRAM TRACING: CPT | Performed by: PHYSICIAN ASSISTANT

## 2022-10-04 PROCEDURE — 85730 THROMBOPLASTIN TIME PARTIAL: CPT

## 2022-10-04 PROCEDURE — 86850 RBC ANTIBODY SCREEN: CPT

## 2022-10-04 PROCEDURE — 71045 X-RAY EXAM CHEST 1 VIEW: CPT

## 2022-10-04 PROCEDURE — 85610 PROTHROMBIN TIME: CPT

## 2022-10-04 PROCEDURE — 85025 COMPLETE CBC W/AUTO DIFF WBC: CPT

## 2022-10-04 PROCEDURE — 97530 THERAPEUTIC ACTIVITIES: CPT

## 2022-10-04 RX ADMIN — CELECOXIB 100 MG: 100 CAPSULE ORAL at 08:46

## 2022-10-04 RX ADMIN — CYCLOBENZAPRINE 10 MG: 10 TABLET, FILM COATED ORAL at 09:12

## 2022-10-04 RX ADMIN — GABAPENTIN 300 MG: 300 CAPSULE ORAL at 14:41

## 2022-10-04 RX ADMIN — ACETAMINOPHEN 1000 MG: 500 TABLET ORAL at 14:40

## 2022-10-04 RX ADMIN — SODIUM CHLORIDE, PRESERVATIVE FREE 10 ML: 5 INJECTION INTRAVENOUS at 20:09

## 2022-10-04 RX ADMIN — GABAPENTIN 300 MG: 300 CAPSULE ORAL at 08:46

## 2022-10-04 RX ADMIN — GLIPIZIDE 10 MG: 10 TABLET ORAL at 08:46

## 2022-10-04 RX ADMIN — OXYCODONE 10 MG: 5 TABLET ORAL at 09:12

## 2022-10-04 RX ADMIN — MAGNESIUM GLUCONATE 500 MG ORAL TABLET 400 MG: 500 TABLET ORAL at 08:45

## 2022-10-04 RX ADMIN — SERTRALINE 50 MG: 50 TABLET, FILM COATED ORAL at 08:45

## 2022-10-04 RX ADMIN — OXYCODONE 10 MG: 5 TABLET ORAL at 04:34

## 2022-10-04 RX ADMIN — OXYCODONE 10 MG: 5 TABLET ORAL at 20:09

## 2022-10-04 RX ADMIN — BUSPIRONE HYDROCHLORIDE 10 MG: 10 TABLET ORAL at 08:46

## 2022-10-04 RX ADMIN — GLIPIZIDE 10 MG: 10 TABLET ORAL at 16:00

## 2022-10-04 RX ADMIN — DESMOPRESSIN ACETATE 40 MG: 0.2 TABLET ORAL at 20:09

## 2022-10-04 RX ADMIN — PANTOPRAZOLE SODIUM 40 MG: 40 TABLET, DELAYED RELEASE ORAL at 16:00

## 2022-10-04 RX ADMIN — BUSPIRONE HYDROCHLORIDE 10 MG: 10 TABLET ORAL at 14:41

## 2022-10-04 RX ADMIN — CELECOXIB 100 MG: 100 CAPSULE ORAL at 20:09

## 2022-10-04 RX ADMIN — BUSPIRONE HYDROCHLORIDE 10 MG: 10 TABLET ORAL at 20:09

## 2022-10-04 RX ADMIN — PANTOPRAZOLE SODIUM 40 MG: 40 TABLET, DELAYED RELEASE ORAL at 08:45

## 2022-10-04 RX ADMIN — LISINOPRIL 40 MG: 20 TABLET ORAL at 08:45

## 2022-10-04 RX ADMIN — GABAPENTIN 300 MG: 300 CAPSULE ORAL at 20:09

## 2022-10-04 RX ADMIN — ROPINIROLE HYDROCHLORIDE 2 MG: 1 TABLET, FILM COATED ORAL at 20:09

## 2022-10-04 ASSESSMENT — PAIN DESCRIPTION - LOCATION
LOCATION: LEG
LOCATION: BUTTOCKS;HIP
LOCATION: LEG

## 2022-10-04 ASSESSMENT — PAIN SCALES - GENERAL
PAINLEVEL_OUTOF10: 9
PAINLEVEL_OUTOF10: 8

## 2022-10-04 ASSESSMENT — PAIN DESCRIPTION - ORIENTATION
ORIENTATION: LEFT

## 2022-10-04 ASSESSMENT — PAIN DESCRIPTION - DESCRIPTORS
DESCRIPTORS: SHOOTING
DESCRIPTORS: SHOOTING
DESCRIPTORS: SHARP;STABBING;OTHER (COMMENT)
DESCRIPTORS: SHARP

## 2022-10-04 NOTE — PROGRESS NOTES
CDU Daily Progress Note  Attending Physician       Pt Name: Josse Moe  MRN: [id number]  [place] 1960  Date of evaluation: 10/4/22    I performed a history and physical examination of the patient and discussed management with the resident. I reviewed the residents note and agree with the documented findings and plan of care. Any areas of disagreement are noted on the chart. I was personally present for the key portions of any procedures. I have documented in the chart those procedures where I was not present during the key portions. I have reviewed the emergency nurses triage note. I agree with the chief complaint, past medical history, past surgical history, allergies, medications, social and family history as documented unless otherwise noted below. Documentation of the HPI, Physical Exam and Medical Decision Making performed by medical students or scribes is based on my personal performance of the HPI, PE and MDM. For Physician Assistant/ Nurse Practitioner cases/documentation I have personally evaluated this patient and have completed at least one if not all key elements of the E/M (history, physical exam, and MDM). Additional findings are as noted. The Family History, Social History and Review of Systems are unchanged from the previous day. No significant events overnight. Patient relates she is still having quite a bit of pain. She will be getting nerve root injection tomorrow with neurosurgery.     Krista Vasquez MD,  MD  Attending Physician  Critical Decision Unit

## 2022-10-04 NOTE — PROGRESS NOTES
Patient placed on 2 liters of oxygen to maintain oxygen saturation of greater than 90% while sleeping. Patients oxygen saturation is WNL when awake.

## 2022-10-04 NOTE — FLOWSHEET NOTE
[x] [facility]) - [facility] & Therapy  [address]  [place]: [phone]   F: [phone]     Physical Therapy Cancel/No Show note    Date: 10/4/2022  Patient: Liang Simpson  : 1960  MRN: [id number]    Visit Count:   Cancels/No Shows to date: 3/0    For today's appointment patient:    [x]  Cancelled    [] Rescheduled appointment    [] No-show     Reason given by patient:    []  Patient ill    []  Conflicting appointment    [] No transportation      [] Conflict with work    [] No reason given    [] Weather related    [] COVID-19    [x] Other:      Comments: Admitted in hospital. Will call back to schedule.        [] Next appointment was confirmed    Electronically signed by: Mortimer Craven, PT

## 2022-10-04 NOTE — SIGNIFICANT EVENT
[] [address]        Occupational Therapy       [address]       Phone: [phone]       Fax: [phone]  [x] [facility]) @ [facility]  [address] [facility], [address] [address]  Phone [phone]  Fax [phone]            Occupational Therapy Cancel/No Show note    Date: 10/4/2022  Patient: Myrtle Ly  : 1960  MRN: [id number]  Cancels/No Shows to date:     For today's appointment patient:  [x]  Cancelled  []  Rescheduled appointment  []  No-show     Reason given by patient:  []  Patient ill  []  Conflicting appointment  []  No transportation    []  Conflict with work  []  No reason given  [x]  Other:     Comments:  Pt admitted to hospital.    Electronically signed by: YAAKOV Jara/CARMEN

## 2022-10-04 NOTE — PLAN OF CARE
Problem: Chronic Conditions and Co-morbidities  Goal: Patient's chronic conditions and co-morbidity symptoms are monitored and maintained or improved  Outcome: Progressing     Problem: Pain  Goal: Verbalizes/displays adequate comfort level or baseline comfort level  Outcome: Progressing     Problem: Safety - Adult  Goal: Free from fall injury  Outcome: Progressing     Problem: ABCDS Injury Assessment  Goal: Absence of physical injury  Outcome: Progressing     Problem: Discharge Planning  Goal: Discharge to home or other facility with appropriate resources  Outcome: Progressing

## 2022-10-04 NOTE — PROGRESS NOTES
Physical Therapy  Facility/Department: [address] ORTHO/MED SURG  Physical Therapy Daily Treatment Note    Name: Marco Antonio Borja  : 1960  MRN: [id number]  Date of Service: 10/4/2022    Discharge Recommendations:  Patient would benefit from continued therapy after discharge   PT Equipment Recommendations  Equipment Needed: No      Patient Diagnosis(es): The encounter diagnosis was Sciatica of left side. Past Medical History:  has a past medical history of Cervical spondylosis, Generalized anxiety disorder, Hyperlipidemia, Hypertension, Lumbar radiculopathy, DIEGO (nonalcoholic steatohepatitis), Obesity, Osteoarthritis of left knee, Recurrent major depressive disorder ([facility] [address] Restless legs syndrome, TIA (transient ischemic attack), and Type II or unspecified type diabetes mellitus without mention of complication, not stated as uncontrolled. Past Surgical History:  has a past surgical history that includes Upper gastrointestinal endoscopy (2012); Colonoscopy (2012);  section; Hysterectomy, total abdominal (); shoulder surgery (Left, 2021); Back Injection (Left, 10/03/2022); and Spine surgery (Left, 10/3/2022). Assessment   Body Structures, Functions, Activity Limitations Requiring Skilled Therapeutic Intervention: Decreased functional mobility ; Decreased strength;Decreased endurance; Increased pain;Decreased balance  Assessment: Pt continues to demonstrate improving functional mobility, requiring SBA to ambulate 130 feet with a RW. Pt limited secondary to increased low back pain, impaired endurance, and LLE pain. Pt would benefit from additional high level PT upon discharge. Pt will require assistance with mobility upon discharge. Therapy Prognosis: Good  Decision Making: Medium Complexity  Requires PT Follow-Up: Yes  Activity Tolerance  Activity Tolerance: Patient limited by fatigue;Patient limited by endurance; Patient limited by pain     Plan   Physcial Therapy Plan  General Plan:  (5-6x/week)  Current Treatment Recommendations: Strengthening, Functional mobility training, Transfer training, Stair training, Gait training, Safety education & training, Balance training, Therapeutic activities, Equipment evaluation, education, & procurement, Home exercise program, Patient/Caregiver education & training, Endurance training  Safety Devices  Type of Devices: Nurse notified, Call light within reach, Gait belt, Left in chair  Restraints  Restraints Initially in Place: No     Restrictions  Restrictions/Precautions  Restrictions/Precautions: Up as Tolerated  Required Braces or Orthoses?: No  Position Activity Restriction  Other position/activity restrictions: L SI joint injection 10/03/2022. Pt reports hx of cubital tunnel with plans for release this month     Subjective   General  Patient assessed for rehabilitation services?: Yes  Response To Previous Treatment: Patient with no complaints from previous session. Family / Caregiver Present: No  Follows Commands: Within Functional Limits  Subjective  Subjective: Pt supine in bed and agreeable to therapy, RN agreeable to therapy. Pt complains of 9/10 L posterior hip pain. Pt pleasant and cooperative throughout. Cognition   Cognition  Overall Cognitive Status: WFL     Objective                           Bed mobility  Supine to Sit: Stand by assistance  Sit to Supine:  (pt retired up to a chair at the conclusion of today's session.)  Scooting: Stand by assistance  Bed Mobility Comments: HOB elevated ~30 degrees. Increased effort to perform.   Transfers  Sit to Stand: Stand by assistance  Stand to Sit: Stand by assistance  Ambulation  Surface: Level tile  Device: Rolling Walker  Assistance: Stand by assistance  Gait Deviations: Slow Kaelyn;Decreased step length;Decreased step height  Distance: 130 feet  More Ambulation?: No  Stairs/Curb  Stairs?: No     Balance  Posture: Good  Sitting - Static: Good  Sitting - Dynamic: Fair;+  Standing - Static: Good;-  Standing - Dynamic: Fair  Comments: RW used to assess standing balance.   Exercise Treatment: x10 BLE in sitting: LAQs, hip flexion, hip abduction, ankle pumps                                                     AM-PAC Score  AM-PAC Inpatient Mobility Raw Score : 18 (10/04/22 1600)  AM-PAC Inpatient T-Scale Score : 43.63 (10/04/22 1600)  Mobility Inpatient CMS 0-100% Score: 46.58 (10/04/22 1600)  Mobility Inpatient CMS G-Code Modifier : CK (10/04/22 1600)            Goals  Short Term Goals  Time Frame for Short Term Goals: 10 visits  Short Term Goal 1: transfers with SBA  Short Term Goal 2: amb 150 ft with a RW x SBA  Short Term Goal 3: ascend/descend 4 steps with SBA  Short Term Goal 4: 20 min strengthening exercise program x SBA  Additional Goals?: No       Education  Patient Education  Education Given To: Patient  Education Provided: Role of Therapy;Plan of Care  Education Method: Verbal  Barriers to Learning: None  Education Outcome: Verbalized understanding      Therapy Time   Individual Concurrent Group Co-treatment   Time In 1417         Time Out 1440         Minutes 23         Timed Code Treatment Minutes: 23 Minutes       Vernon Linares PT

## 2022-10-04 NOTE — PROGRESS NOTES
Occupational Therapy  Facility/Department: [address] ORTHO/MED SURG  Occupational Therapy Initial Assessment    Name: Gill Diana  : 1960  MRN: [id number]  Date of Service: 10/4/2022    Chief Complaint   Patient presents with    Back Pain     \"Sciatica back pain\"        Discharge Recommendations:  Patient would benefit from continued therapy after discharge  OT Equipment Recommendations  Equipment Needed: Yes  Mobility Devices: ADL Assistive Devices  ADL Assistive Devices: Shower Chair with back       Patient Diagnosis(es): The encounter diagnosis was Sciatica of left side. Past Medical History:  has a past medical history of Cervical spondylosis, Generalized anxiety disorder, Hyperlipidemia, Hypertension, Lumbar radiculopathy, DIEGO (nonalcoholic steatohepatitis), Obesity, Osteoarthritis of left knee, Recurrent major depressive disorder ([facility] [address] Restless legs syndrome, TIA (transient ischemic attack), and Type II or unspecified type diabetes mellitus without mention of complication, not stated as uncontrolled. Past Surgical History:  has a past surgical history that includes Upper gastrointestinal endoscopy (2012); Colonoscopy (2012);  section; Hysterectomy, total abdominal (); shoulder surgery (Left, 2021); Back Injection (Left, 10/03/2022); and Spine surgery (Left, 10/3/2022). Assessment   Performance deficits / Impairments: Decreased functional mobility ; Decreased ADL status; Decreased ROM; Decreased strength;Decreased sensation;Decreased balance;Decreased high-level IADLs;Decreased coordination  Assessment: Pt agreeable to OT eval however reporting significant pain initially. RN was called to administer pain medication at beginning of session. Pt exhibits decrease B hand strength, coordination, and sensation with reports of cubital tunnel.  Pt completed functional transfers multiple times throughout session with CGA, RW use, and requiring VCs for hand placement for 50% of transfers. Pt completed functional mobility with CGA and RW use with decreased speed and slight balance concerns noted. Pt engaged in toileting, LB dressing, and grooming tasks this date; see below for specifics regarding levels of assistance. Pt was issues a yellow, light resistance foam block at end of session and instructed to engage in strengthening ex throughout day with good understanding verbalized. Pt will require continued OT services to increase independence and safety with ADLs/IADLs and functional transfers/mobility. Prognosis: Good  Decision Making: Medium Complexity  REQUIRES OT FOLLOW-UP: Yes  Activity Tolerance  Activity Tolerance: Patient Tolerated treatment well;Patient limited by pain        Plan   Occupational Therapy Plan  Times Per Week: 3-4 x/wk  Current Treatment Recommendations: Strengthening, Balance training, Functional mobility training, Pain management, Safety education & training, Patient/Caregiver education & training, Equipment evaluation, education, & procurement, Self-Care / ADL, Home management training, Coordination training     Restrictions  Restrictions/Precautions  Restrictions/Precautions: Up as Tolerated  Required Braces or Orthoses?: No  Position Activity Restriction  Other position/activity restrictions: L SI joint injection 10/03/2022. Pt reports hx of cubital tunnel with plans for release this month    Subjective   General  Patient assessed for rehabilitation services?: Yes  Family / Caregiver Present: No  General Comment  Comments: RN ok'd pt for OT eval this date. Pt agreeable to session and pleasant/cooperative throughout. Pt reports 9/10 pain to L lower back, L hip, L buttock that occasionally shoots down LLE. RN called to administer pain medication at beginning of session.     Social/Functional History  Social/Functional History  Lives With: Spouse, Daughter ( and 2 daughters)  Type of Home: Apartment  Home Layout: One level (2nd fl apt)  Home Access: Stairs to enter with rails  Entrance Stairs - Number of Steps: 21  Entrance Stairs - Rails: Right  Bathroom Shower/Tub: Tub/Shower unit  Bathroom Toilet: Standard  Home Equipment: Lum Griffon, rolling, Rollator (pt reports utilizing cane for community distances only)  ADL Assistance: Independent  Homemaking Assistance: Independent  Homemaking Responsibilities: Yes (pt reports being independent with cleaning and laundry however  completes cooking)  Ambulation Assistance: Independent  Transfer Assistance: Independent  Active : No (pt reports recently stopped driving secondary to L hand deficits, dtr drives)  Mode of Transportation: SUV  Occupation: Unemployed  Type of Occupation: pt reports recently being let go from [employer] position  [address]: read, go for walks  Additional Comments: Pt reports one daughter could provide 24/7 assistance if necessary       Objective   Safety Devices  Type of Devices: Nurse notified; Left in bed;Call light within reach;Gait belt  Restraints  Restraints Initially in Place: No    Bed Mobility Training  Bed Mobility Training: Yes  Supine to Sit:  (pt seated in recliner upon OT entrance)  Sit to Supine: Independent (HOB flat to simulate household distances)  Balance  Sitting: Intact (pt engaged in static and dynamic sitting independently this date while engaging in toileting and LB dressing ~35 minutes)  Standing: With support (SBA for static and dynamic standing sinkside, CGA for standing with bilateral hand release from RW or sink. Total standing time ~8 minutes)  Transfer Training  Transfer Training: Yes  Overall Level of Assistance: Contact-guard assistance; Adaptive equipment (pt completed transfers from recliner, toilet, and EOB 2-3X with CGA and RW use. Pt ed on proper hand placement for 50% of transfers)  Interventions: Verbal cues  Sit to Stand: Contact-guard assistance; Adaptive equipment  Stand to Sit: Contact-guard assistance; Adaptive equipment  Toilet Transfer: Contact-guard assistance; Adaptive equipment  Gait  Overall Level of Assistance: Contact-guard assistance; Adaptive equipment; Additional time (pt completed functional mobility from recliner > bathroom > EOB with CGA and RW use. No gross LOB noted this date however pt exhibits mild unsteadiness and slowed mobility)    AROM: Within functional limits (with exceptions of L hand)  Strength: Generally decreased, functional (B shoulders and elbows grossly 4/5, R hand 4-/5, L hand 3+/5)  Coordination: Generally decreased, functional (decreased speed and accuracy bilaterally however coordination worse in L hand)  Tone: Normal  Sensation: Impaired (pt reports numbness/tingling to digits 4-5 bilaterally extending proximally to medial aspect of B elbows; pt reports worse on L)    ADL  Feeding: Modified independent ;Setup; Increased time to complete  Grooming: Modified independent ;Setup; Increased time to complete  Grooming Skilled Clinical Factors: pt engaged in oral hygiene, hand hygiene, and hair care standing sinkside requiring increased time d/t [address] deficits  UE Bathing: Modified independent ;Setup; Increased time to complete  LE Bathing: Stand by assistance;Setup; Increased time to complete  UE Dressing: Modified independent ;Setup; Increased time to complete  LE Dressing: Contact guard assistance;Setup; Increased time to complete  LE Dressing Skilled Clinical Factors: pt engaged in donning personal underwear and personal pants requiring CGA for standing portions d/t slight balance concerns. Pt completing 2 functional transfers throughout LB dressing  Toileting: Contact guard assistance;Setup; Increased time to complete  Toileting Skilled Clinical Factors: pt completed toileting task requiring CGA for transfers and standing portions of clothing management this date    Vision  Vision: Impaired  Vision Exceptions: Wears glasses at all times  Hearing  Hearing: Within functional limits  Cognition  Overall Cognitive Status: [facility]    Education Provided Comments: Pt ed on OT role, OT POC, safety awareness, transfer training, DME use, exercises for B hand strengthening, ADL adaptive tech, and importance of continued OT. Good return noted.     LUE AROM (degrees)  LUE AROM : WFL  Left Hand AROM (degrees)  Left Hand AROM: Exceptions  Left Hand General AROM: difficulty achiving full composite fist with L hand this date, reaching ~1/2 inch away from distal palmar crease with digits 4-5  RUE AROM (degrees)  RUE AROM : WFL  Right Hand AROM (degrees)  Right Hand AROM: WFL    Hand Dominance  Hand Dominance: Right            AM-PAC Score        AM-PAC Inpatient Daily Activity Raw Score: 21 (10/04/22 1526)  AM-PAC Inpatient ADL T-Scale Score : 44.27 (10/04/22 1526)  ADL Inpatient CMS 0-100% Score: 32.79 (10/04/22 1526)  ADL Inpatient CMS G-Code Modifier : CJ (10/04/22 1526)    Goals  Short Term Goals  Time Frame for Short Term Goals: By discharge, pt will:  Short Term Goal 1: Demo Mod I for functional transfers and functional mobility for engagement in ADLs/IADLs  Short Term Goal 2: Demo Mod I for all ADLs, utilizing adaptive tech PRN  Short Term Goal 3: Engage in B hand strengthening ex with use of foam block ( & pinch) for X10 reps/3 sets for improved functional use in meaningful occupations  Short Term Goal 4: Participate in 5 min [facility] task bilaterally with <2 fumbles of items to increase independence in ADLs/IADLs  Short Term Goal 5: Demo 10+ min dynamic standing and reaching outside RICA with uni/bilateral hand release and SUP for participation in daily activities       Therapy Time   Individual Concurrent Group Co-treatment   Time In 0905         Time Out 0954         Minutes 49         Timed Code Treatment Minutes: [address] YAAKOV Braga/L

## 2022-10-04 NOTE — PLAN OF CARE
Problem: Chronic Conditions and Co-morbidities  Goal: Patient's chronic conditions and co-morbidity symptoms are monitored and maintained or improved  10/4/2022 0023 by Saurabh Farrell RN  Outcome: Progressing  10/3/2022 1855 by Elio Kincaid RN  Outcome: Progressing     Problem: Pain  Goal: Verbalizes/displays adequate comfort level or baseline comfort level  10/4/2022 0023 by Saurabh Farrell RN  Outcome: Progressing  10/3/2022 1855 by Elio Kincaid RN  Outcome: Progressing     Problem: Safety - Adult  Goal: Free from fall injury  10/4/2022 0023 by Saurabh Farrell RN  Outcome: Progressing  10/3/2022 1855 by Elio Kincaid RN  Outcome: Progressing     Problem: ABCDS Injury Assessment  Goal: Absence of physical injury  10/4/2022 0023 by Saurabh Farrell RN  Outcome: Progressing  10/3/2022 1855 by Elio Kincaid RN  Outcome: Progressing     Problem: Discharge Planning  Goal: Discharge to home or other facility with appropriate resources  10/4/2022 0023 by Saurabh Farrell RN  Outcome: Progressing  10/3/2022 1855 by Elio Kincaid RN  Outcome: Progressing

## 2022-10-04 NOTE — PROGRESS NOTES
Neurosurgery CALEB/Resident    Daily Progress Note   Chief Complaint   Patient presents with    Back Pain     \"Sciatica back pain\"      10/4/2022  10:48 AM    Chart reviewed. No acute events overnight. No new complaints. Continues to complain of left leg pain, no relief with SI joint injection. Vitals:    10/03/22 1548 10/03/22 1957 10/04/22 0430 10/04/22 0726   BP: 119/70 128/69  138/80   Pulse:  88  75   Resp:  20  16   Temp: 97.9 °F (36.6 °C) 98.2 °F (36.8 °C)  98.7 °F (37.1 °C)   TempSrc:  Oral  Temporal   SpO2: 95% 93% 93% 94%   Weight:       Height:             PE: AOx3   Motor   L deltoid 5/5; R deltoid 5/5  L biceps 5/5; R biceps 5/5  L triceps 5/5; R triceps 5/5  L wrist extension 5/5; R wrist extension 5/5  L intrinsics 5/5; R intrinsics 5/5      L iliopsoas 4/5 , R iliopsoas 5/5  L quadriceps 4/5; R quadriceps 5/5  L Dorsiflexion 4/5; R dorsiflexion 5/5  L Plantarflexion 4/5; R plantarflexion 5/5  L EHL 4/5; R EHL 5/5    Sensation diminished to light touch      Lab Results   Component Value Date    WBC 10.9 06/13/2022    HGB 10.3 (L) 06/13/2022    HCT 33.7 (L) 06/13/2022     06/13/2022    CHOL 154 06/24/2021    TRIG 217 (H) 06/24/2021    HDL 59 06/24/2021    LDLDIRECT 156 (H) 05/20/2017    ALT 36 (H) 05/31/2022    AST 27 05/31/2022     07/06/2022    K 4.6 07/06/2022     07/06/2022    CREATININE 1.00 (H) 07/06/2022    BUN 15 07/06/2022    CO2 25 07/06/2022    TSH 1.37 07/06/2022    INR 1.0 07/23/2013    GLUF 262 (H) 09/10/2018    LABA1C 6.9 08/25/2022    LABMICR Can not be calculated 09/22/2022    .6 (H) 06/06/2022    SEDRATE 14 08/05/2020         A/P  65 y.o. female with left lower back pain radiating to left lower extremity. History disc bulge at L5-S1 with concern for encroachment at S1 nerve root. - Discussed option for surgical discectomy vs left S1 nerve root injection, patient prefers nerve root injection. Will discuss with Dr Anjelica Mathew when he returns tomorrow.     - PT/OT eval and treat   - Activity as tolerated   - Neuro checks per floor protocol      Please contact neurosurgery with any changes in patients neurologic status.        Eduardo Jasso PA-C  10/4/22  10:48 AM

## 2022-10-04 NOTE — PROGRESS NOTES
OBS/CDU   RESIDENT NOTE      Patients PCP is:  Sanchez Yan MD        SUBJECTIVE      No acute events overnight. Has been able to tolerate a full diet without nausea or vomiting. The patient is urinating on his own and is passing flatus. Denies fever, chills, nausea, vomiting, chest pain, shortness of breath, abdominal pain, focal weakness, numbness, tingling, urinary/bowel symptoms, vision changes, visual hallucinations, or headache. This morning the patient was in no acute distress and in mild discomfort while laying in bed. Patient states that she has not achieved any relief following the sacroiliac injection done with Dr. Carly Melton. This morning neurosurgery came and assessed the patient and gave her options for a intraforaminal injection at S1 or a discectomy. Patient chose to try the injection first.   Physician assistant will discuss with Dr. Eduardo Aguilera tomorrow regarding the procedure. PHYSICAL EXAM      General: NAD, AO X 3  Heent: EMOI, PERRL  Neck: SUPPLE, NO JVD  Cardiovascular: RRR, S1S2  Pulmonary: CTAB, NO SOB  Abdomen: SOFT, NTTP, ND, +BS  Extremities: +2/4 PULSES DISTAL, NO SWELLING, pain with movement of the left lower extremity, decreased muscle strength in comparison to right due to pain. Patient able to flex hip and knee, extend knee, dorsiflex and plantarflex foot  Neuro / Psych: NO NUMBNESS OR TINGLING, MENTATION AT BASELINE    PERTINENT TEST /EXAMS      I have reviewed all available laboratory results.     MEDICATIONS CURRENT   glucose chewable tablet 16 g, PRN  dextrose bolus 10% 125 mL, PRN   Or  dextrose bolus 10% 250 mL, PRN  glucagon (rDNA) injection 1 mg, PRN  dextrose 10 % infusion, Continuous PRN  cefTRIAXone (ROCEPHIN) 1,000 mg in sterile water 10 mL IV syringe, Once  cyclobenzaprine (FLEXERIL) tablet 10 mg, TID PRN  Empagliflozin-metFORMIN HCl 5-1000 MG TABS 1 tablet (Patient Supplied), BID  oxyCODONE (ROXICODONE) immediate release tablet 10 mg, Q4H PRN  lidocaine 4 % external patch 1 patch, Daily  atorvastatin (LIPITOR) tablet 40 mg, Nightly  busPIRone (BUSPAR) tablet 10 mg, TID  celecoxib (CELEBREX) capsule 100 mg, BID  furosemide (LASIX) tablet 10 mg, Every Other Day  gabapentin (NEURONTIN) capsule 300 mg, TID  glipiZIDE (GLUCOTROL) tablet 10 mg, BID AC  lisinopril (PRINIVIL;ZESTRIL) tablet 40 mg, Daily  magnesium oxide (MAG-OX) tablet 400 mg, Daily  pantoprazole (PROTONIX) tablet 40 mg, BID AC  rOPINIRole (REQUIP) tablet 2 mg, Nightly  sertraline (ZOLOFT) tablet 50 mg, Daily  sodium chloride flush 0.9 % injection 5-40 mL, 2 times per day  sodium chloride flush 0.9 % injection 5-40 mL, PRN  0.9 % sodium chloride infusion, PRN  ondansetron (ZOFRAN-ODT) disintegrating tablet 4 mg, Q8H PRN   Or  ondansetron (ZOFRAN) injection 4 mg, Q6H PRN  polyethylene glycol (GLYCOLAX) packet 17 g, Daily PRN  acetaminophen (TYLENOL) tablet 1,000 mg, 3 times per day      All medication charted and reviewed. CONSULTS      IP CONSULT TO NEUROSURGERY    ASSESSMENT/PLAN       Marco Antonio Borja is a 65 y.o. female who presents with left lower back pain/sciatica pain. Neurosurgery consulted:  MRI lumbar spine without contrast:  Degenerative change most pronounced in the lower lumbar spine with mild right foraminal narrowing at L5-S1 and lateral recess narrowing that is worse on the right compared to the left. Status post sacroiliac joint injection under fluoroscopy. Per neurosurgery, if patient has not relief they will consider taking operative treatment for either L5-S1 disc rupture or perform S1 foraminal injection. Patient chose to try nerve root injection; physician assistant to talk with Dr. Brinda Rosas on 10/5 regarding the procedure. Physical therapy consulted: They recommend 5 to 6 weeks of outpatient therapy for strengthening and functional mobility.   Continue home medications and pain control  Monitor vitals, labs, and imaging  DISPO: pending procedure date/time with neurosurgery and clinical improvement    --  Lola Cancino DO  Emergency Medicine Resident Physician     This dictation was generated by voice recognition computer software. Although all attempts are made to edit the dictation for accuracy, there may be errors in the transcription that are not intended.

## 2022-10-05 ENCOUNTER — ANESTHESIA EVENT (OUTPATIENT)
Dept: OPERATING ROOM | Age: 62
DRG: 552 | End: 2022-10-05

## 2022-10-05 LAB
EKG ATRIAL RATE: 69 BPM
EKG P AXIS: 58 DEGREES
EKG P-R INTERVAL: 158 MS
EKG Q-T INTERVAL: 410 MS
EKG QRS DURATION: 72 MS
EKG QTC CALCULATION (BAZETT): 439 MS
EKG R AXIS: -16 DEGREES
EKG T AXIS: -13 DEGREES
EKG VENTRICULAR RATE: 69 BPM
GLUCOSE BLD-MCNC: 104 MG/DL (ref 65–105)
GLUCOSE BLD-MCNC: 120 MG/DL (ref 65–105)
GLUCOSE BLD-MCNC: 130 MG/DL (ref 65–105)
GLUCOSE BLD-MCNC: 206 MG/DL (ref 65–105)

## 2022-10-05 PROCEDURE — 82947 ASSAY GLUCOSE BLOOD QUANT: CPT

## 2022-10-05 PROCEDURE — 94761 N-INVAS EAR/PLS OXIMETRY MLT: CPT

## 2022-10-05 PROCEDURE — 1200000000 HC SEMI PRIVATE

## 2022-10-05 PROCEDURE — 6370000000 HC RX 637 (ALT 250 FOR IP): Performed by: PHYSICIAN ASSISTANT

## 2022-10-05 PROCEDURE — 97110 THERAPEUTIC EXERCISES: CPT

## 2022-10-05 PROCEDURE — 2580000003 HC RX 258: Performed by: PHYSICIAN ASSISTANT

## 2022-10-05 PROCEDURE — 97116 GAIT TRAINING THERAPY: CPT

## 2022-10-05 PROCEDURE — APPSS30 APP SPLIT SHARED TIME 16-30 MINUTES: Performed by: PHYSICIAN ASSISTANT

## 2022-10-05 PROCEDURE — 93010 ELECTROCARDIOGRAM REPORT: CPT | Performed by: INTERNAL MEDICINE

## 2022-10-05 RX ADMIN — OXYCODONE 10 MG: 5 TABLET ORAL at 19:52

## 2022-10-05 RX ADMIN — GLIPIZIDE 10 MG: 10 TABLET ORAL at 08:36

## 2022-10-05 RX ADMIN — GABAPENTIN 300 MG: 300 CAPSULE ORAL at 20:32

## 2022-10-05 RX ADMIN — OXYCODONE 10 MG: 5 TABLET ORAL at 23:51

## 2022-10-05 RX ADMIN — BUSPIRONE HYDROCHLORIDE 10 MG: 10 TABLET ORAL at 08:36

## 2022-10-05 RX ADMIN — ROPINIROLE HYDROCHLORIDE 2 MG: 1 TABLET, FILM COATED ORAL at 20:31

## 2022-10-05 RX ADMIN — PANTOPRAZOLE SODIUM 40 MG: 40 TABLET, DELAYED RELEASE ORAL at 16:50

## 2022-10-05 RX ADMIN — DESMOPRESSIN ACETATE 40 MG: 0.2 TABLET ORAL at 20:32

## 2022-10-05 RX ADMIN — PANTOPRAZOLE SODIUM 40 MG: 40 TABLET, DELAYED RELEASE ORAL at 08:36

## 2022-10-05 RX ADMIN — BUSPIRONE HYDROCHLORIDE 10 MG: 10 TABLET ORAL at 20:32

## 2022-10-05 RX ADMIN — ACETAMINOPHEN 1000 MG: 500 TABLET ORAL at 21:55

## 2022-10-05 RX ADMIN — SODIUM CHLORIDE, PRESERVATIVE FREE 10 ML: 5 INJECTION INTRAVENOUS at 08:44

## 2022-10-05 RX ADMIN — ACETAMINOPHEN 1000 MG: 500 TABLET ORAL at 13:38

## 2022-10-05 RX ADMIN — FUROSEMIDE 10 MG: 20 TABLET ORAL at 08:36

## 2022-10-05 RX ADMIN — CYCLOBENZAPRINE 10 MG: 10 TABLET, FILM COATED ORAL at 21:56

## 2022-10-05 RX ADMIN — OXYCODONE 10 MG: 5 TABLET ORAL at 06:15

## 2022-10-05 RX ADMIN — SERTRALINE 50 MG: 50 TABLET, FILM COATED ORAL at 08:36

## 2022-10-05 RX ADMIN — CELECOXIB 100 MG: 100 CAPSULE ORAL at 08:36

## 2022-10-05 RX ADMIN — GABAPENTIN 300 MG: 300 CAPSULE ORAL at 08:36

## 2022-10-05 RX ADMIN — BUSPIRONE HYDROCHLORIDE 10 MG: 10 TABLET ORAL at 13:38

## 2022-10-05 RX ADMIN — ACETAMINOPHEN 1000 MG: 500 TABLET ORAL at 06:14

## 2022-10-05 RX ADMIN — CELECOXIB 100 MG: 100 CAPSULE ORAL at 20:32

## 2022-10-05 RX ADMIN — SODIUM CHLORIDE, PRESERVATIVE FREE 10 ML: 5 INJECTION INTRAVENOUS at 20:32

## 2022-10-05 RX ADMIN — MAGNESIUM GLUCONATE 500 MG ORAL TABLET 400 MG: 500 TABLET ORAL at 08:36

## 2022-10-05 RX ADMIN — LISINOPRIL 40 MG: 20 TABLET ORAL at 08:36

## 2022-10-05 RX ADMIN — OXYCODONE 10 MG: 5 TABLET ORAL at 16:21

## 2022-10-05 RX ADMIN — OXYCODONE 10 MG: 5 TABLET ORAL at 11:10

## 2022-10-05 RX ADMIN — GLIPIZIDE 10 MG: 10 TABLET ORAL at 16:50

## 2022-10-05 RX ADMIN — CYCLOBENZAPRINE 10 MG: 10 TABLET, FILM COATED ORAL at 16:22

## 2022-10-05 RX ADMIN — GABAPENTIN 300 MG: 300 CAPSULE ORAL at 13:38

## 2022-10-05 ASSESSMENT — PAIN SCALES - GENERAL
PAINLEVEL_OUTOF10: 8
PAINLEVEL_OUTOF10: 9
PAINLEVEL_OUTOF10: 10
PAINLEVEL_OUTOF10: 8
PAINLEVEL_OUTOF10: 10
PAINLEVEL_OUTOF10: 8
PAINLEVEL_OUTOF10: 0
PAINLEVEL_OUTOF10: 8
PAINLEVEL_OUTOF10: 9
PAINLEVEL_OUTOF10: 0

## 2022-10-05 ASSESSMENT — PAIN DESCRIPTION - PAIN TYPE
TYPE: CHRONIC PAIN
TYPE: CHRONIC PAIN

## 2022-10-05 ASSESSMENT — PAIN DESCRIPTION - DESCRIPTORS
DESCRIPTORS: SHARP

## 2022-10-05 ASSESSMENT — PAIN DESCRIPTION - LOCATION
LOCATION: BACK;BUTTOCKS;HIP;LEG
LOCATION: BUTTOCKS;BACK;HIP

## 2022-10-05 ASSESSMENT — PAIN DESCRIPTION - FREQUENCY
FREQUENCY: CONTINUOUS
FREQUENCY: CONTINUOUS

## 2022-10-05 ASSESSMENT — PAIN SCALES - WONG BAKER: WONGBAKER_NUMERICALRESPONSE: 8

## 2022-10-05 ASSESSMENT — PAIN DESCRIPTION - ORIENTATION
ORIENTATION: LEFT;LOWER

## 2022-10-05 ASSESSMENT — PAIN - FUNCTIONAL ASSESSMENT
PAIN_FUNCTIONAL_ASSESSMENT: PREVENTS OR INTERFERES WITH MANY ACTIVE NOT PASSIVE ACTIVITIES
PAIN_FUNCTIONAL_ASSESSMENT: PREVENTS OR INTERFERES WITH MANY ACTIVE NOT PASSIVE ACTIVITIES

## 2022-10-05 ASSESSMENT — PAIN DESCRIPTION - ONSET
ONSET: ON-GOING
ONSET: ON-GOING

## 2022-10-05 NOTE — PROGRESS NOTES
Physical Therapy  Facility/Department: [address] ORTHO/MED SURG  Physical Therapy Daily Treatment Note    Name: Reyna Packer  : 1960  MRN: [id number]  Date of Service: 10/5/2022    Discharge Recommendations:  Patient would benefit from continued therapy after discharge   PT Equipment Recommendations  Equipment Needed: No      Patient Diagnosis(es): The encounter diagnosis was Sciatica of left side. Past Medical History:  has a past medical history of Cervical spondylosis, Generalized anxiety disorder, Hyperlipidemia, Hypertension, Lumbar radiculopathy, DIEGO (nonalcoholic steatohepatitis), Obesity, Osteoarthritis of left knee, Recurrent major depressive disorder ([facility] [address] Restless legs syndrome, TIA (transient ischemic attack), and Type II or unspecified type diabetes mellitus without mention of complication, not stated as uncontrolled. Past Surgical History:  has a past surgical history that includes Upper gastrointestinal endoscopy (2012); Colonoscopy (2012);  section; Hysterectomy, total abdominal (); shoulder surgery (Left, 2021); Back Injection (Left, 10/03/2022); and Spine surgery (Left, 10/3/2022). Assessment   Body Structures, Functions, Activity Limitations Requiring Skilled Therapeutic Intervention: Decreased functional mobility ; Decreased strength;Decreased endurance; Increased pain;Decreased balance  Assessment: Pt continues to demonstrate improving functional mobility, requiring SBA to ambulate 300ft with a RW. Pt limited secondary to increased low back pain, impaired endurance, and LLE pain. Pt would benefit from additional high level PT upon discharge to address deficits and maximize safety and independence with mobility. Therapy Prognosis: Good  Requires PT Follow-Up: Yes  Activity Tolerance  Activity Tolerance: Patient limited by pain; Patient tolerated treatment well     Plan   Physcial Therapy Plan  General Plan:  (5-6x/week)  Current Treatment Deviations: Slow Kaelyn;Decreased step length;Decreased step height  Distance: 300ft  Comments: Pt ambulated slow and steady with no noted LOB. More Ambulation?: No  Stairs/Curb  Stairs?: No     Balance  Posture: Good  Sitting - Static: Good  Sitting - Dynamic: Fair;+  Standing - Static: Good;-  Standing - Dynamic: Fair  Comments: RW used to assess standing balance. Exercise Treatment:   Seated LE exercises: LAQ, seated marches, hip abduction, ankle pumps. Reps: x10 BLE  Comments: Pt performed while seated in chair.       AM-PAC Score  AM-PAC Inpatient Mobility Raw Score : 18 (10/05/22 1535)  AM-PAC Inpatient T-Scale Score : 43.63 (10/05/22 1535)  Mobility Inpatient CMS 0-100% Score: 46.58 (10/05/22 1535)  Mobility Inpatient CMS G-Code Modifier : CK (10/05/22 1535)      Goals  Short Term Goals  Time Frame for Short Term Goals: 10 visits  Short Term Goal 1: transfers with SBA  Short Term Goal 2: amb 150 ft with a RW x SBA  Short Term Goal 3: ascend/descend 4 steps with SBA  Short Term Goal 4: 20 min strengthening exercise program x SBA  Additional Goals?: No       Education  Patient Education  Education Given To: Patient  Education Provided: Role of Therapy;Plan of Care  Education Method: Verbal  Barriers to Learning: None  Education Outcome: Verbalized understanding      Therapy Time   Individual Concurrent Group Co-treatment   Time In 1500         Time Out 1526         Minutes 26         Timed Code Treatment Minutes: [address]arro PTA

## 2022-10-05 NOTE — ANESTHESIA PRE PROCEDURE
Department of Anesthesiology  Preprocedure Note       Name:  Bard Smith   Age:  65 y.o.  :  1960                                          MRN:  [id number]         Date:  10/5/2022      Surgeon: Octavio Coffey):  Stephanie Baez MD    Procedure: Procedure(s):  S1 NERVE ROOT INJECTION    Medications prior to admission:   Prior to Admission medications    Medication Sig Start Date End Date Taking? Authorizing Provider   sertraline (ZOLOFT) 50 MG tablet TAKE ONE TABLET BY MOUTH EVERY MORNING 22   Gloria Childers MD   TRULICITY 1.5 OD/1.6EG [facility] INJECT THE CONTENTS OF ONE SYRINGE UNDER THE SKIN ONCE WEEKLY 22   Gloria Childers MD   celecoxib (CELEBREX) 100 MG capsule TAKE 1 CAPSULE BY MOUTH 2 TIMES A DAY 22   Tashi Cantrell MD   SYNJARDY 5-1000 MG TABS TAKE 1 TABLET BY MOUTH 2 TIMES A DAY 22   Gloria Childers MD   lisinopril (PRINIVIL;ZESTRIL) 40 MG tablet Take 1 tablet by mouth daily 22   Kari Small MD   busPIRone (BUSPAR) 10 MG tablet TAKE 1 TABLET BY MOUTH 3 TIMES A DAY 22   Sangeeta Gonzalez MD   Elastic Bandages & Supports (ELBOW BRACE) MISC 1 each by Does not apply route at bedtime Please dispense left cubital tunnel brace 22  Crespo Patella, APRN - CNP   ASPIRIN LOW DOSE 81 MG EC tablet TAKE 1 TABLET BY MOUTH ONE TIME A DAY 8/10/22   Gloria Childers MD   alendronate (FOSAMAX) 70 MG tablet Take 1 tablet by mouth every 7 days 22   Angus Mejias MD   oxyCODONE-acetaminophen (PERCOCET) 5-325 MG per tablet Take by mouth every 4 hours as needed for Pain.     Historical Provider, MD   atorvastatin (LIPITOR) 40 MG tablet Take 1 tablet by mouth at bedtime 7/15/22   Anastacia Fonseca MD   rOPINIRole (REQUIP) 1 MG tablet Take 2 tablets by mouth nightly 7/15/22   Leon Barrera MD   acetaminophen (TYLENOL 8 HOUR) 650 MG extended release tablet Take 1 tablet by mouth every 8 hours as needed for Pain 7/15/22   Leon Barrera MD   miconazole (ZEASORB-AF) 2 % powder Apply 43 g topically as needed for Itching Apply topically 2 times daily. 7/15/22   Anastacia Keith MD   furosemide (LASIX) 20 MG tablet Take 0.5 tablets by mouth every other day 7/15/22   Abdiaziz Angeles MD   diclofenac sodium (VOLTAREN) 1 % GEL Apply 2 g topically in the morning and 2 g at noon and 2 g in the evening and 2 g before bedtime. 7/15/22   Anastacia Keith MD   blood glucose test strips (PRODIGY NO CODING BLOOD GLUC) strip Use to test once daily and as needed as directed by provider. Please dispense Prodigy brand. 7/12/22   Maldonado Hidalgo MD   blood glucose monitor kit and supplies Dispense sufficient amount for indicated testing frequency plus additional to accommodate PRN testing needs. Dispense all needed supplies to include: monitor, strips, lancing device, lancets, control solutions, alcohol swabs. Prodigy Brand 7/6/22   Maldonado Hidalgo MD   Magnesium Oxide 400 MG CAPS Take 400 mg by mouth daily 7/6/22   Mo'Men Carissa Ortiz MD   gabapentin (NEURONTIN) 300 MG capsule Take 1 capsule by mouth 3 times daily for 30 days.  6/9/22 7/9/22  Darius Magana MD   glipiZIDE (GLUCOTROL) 10 MG tablet Take 1 tab bid 1/13/22   Maldonado Hidalgo MD   omeprazole (PRILOSEC) 20 MG delayed release capsule Take 1 capsule by mouth 2 times daily 7/26/21   Maldonado Hidalgo MD   albuterol sulfate HFA (PROVENTIL HFA) 108 (90 Base) MCG/ACT inhaler Inhale 2 puffs into the lungs every 4 hours as needed for Wheezing 4/20/21 4/20/22  Maldonado Hidalgo MD   repaglinide (PRANDIN) 1 MG tablet TAKE 1 TABLET BY MOUTH 3 TIMES A DAY BEFORE MEALS 12/8/20   Maldonado Hidalgo MD   bimatoprost (LUMIGAN) 0.01 % SOLN ophthalmic drops Place 1 drop into both eyes nightly 4/26/18   Historical Provider, MD   PRODIGY LANCETS 28G MISC 1 Bottle by Does not apply route three times daily 4/26/19   Maldonado Hidalgo MD   Blood Glucose Monitoring Suppl (PRODIGY AUTOCODE BLOOD GLUCOSE) w/Device KIT 1 Device by Does not apply route 3 times daily 4/26/19   Neisha Calixto MD       Current medications:    No current facility-administered medications for this visit. No current outpatient medications on file.      Facility-Administered Medications Ordered in Other Visits   Medication Dose Route Frequency Provider Last Rate Last Admin    glucose chewable tablet 16 g  4 tablet Oral PRN Owen Madisyn, PA        dextrose bolus 10% 125 mL  125 mL IntraVENous PRN Owen Madisyn, PA        Or    dextrose bolus 10% 250 mL  250 mL IntraVENous PRN Owen Madisyn, PA        glucagon (rDNA) injection 1 mg  1 mg SubCUTAneous PRN Owen Madisyn, PA        dextrose 10 % infusion   IntraVENous Continuous PRN Owen Madisyn, PA        cefTRIAXone (ROCEPHIN) 1,000 mg in sterile water 10 mL IV syringe  1,000 mg IntraVENous Once Owen Madisyn, PA        cyclobenzaprine (FLEXERIL) tablet 10 mg  10 mg Oral TID PRN Owen Madisyn, PA   10 mg at 10/04/22 0912    Empagliflozin-metFORMIN HCl 5-1000 MG TABS 1 tablet (Patient Supplied)  1 tablet Oral BID Owen Madisyn, PA   1 tablet at 10/05/22 9207    oxyCODONE (ROXICODONE) immediate release tablet 10 mg  10 mg Oral Q4H PRN Owen Madisyn, PA   10 mg at 10/05/22 1110    lidocaine 4 % external patch 1 patch  1 patch TransDERmal Daily Owen Madisyn, PA   1 patch at 10/05/22 0839    atorvastatin (LIPITOR) tablet 40 mg  40 mg Oral Nightly Owen Madisyn, PA   40 mg at 10/04/22 2009    busPIRone (BUSPAR) tablet 10 mg  10 mg Oral TID Owen Madisyn, PA   10 mg at 10/05/22 0836    celecoxib (CELEBREX) capsule 100 mg  100 mg Oral BID Owen Madisyn, PA   100 mg at 10/05/22 0447    furosemide (LASIX) tablet 10 mg  10 mg Oral Every Other Day Owen Madisyn, PA   10 mg at 10/05/22 0836    gabapentin (NEURONTIN) capsule 300 mg  300 mg Oral TID Owen Madisyn, PA   300 mg at 10/05/22 0836    glipiZIDE (GLUCOTROL) tablet 10 mg  10 mg Oral BID AC Owen Madisyn, PA   10 mg at 10/05/22 0836    lisinopril (PRINIVIL;ZESTRIL) tablet 40 mg  40 mg Oral Daily Jennyfer Flattery, PA   40 mg at 10/05/22 0836    magnesium oxide (MAG-OX) tablet 400 mg  400 mg Oral Daily Jennyfer Flattery, PA   400 mg at 10/05/22 0836    pantoprazole (PROTONIX) tablet 40 mg  40 mg Oral BID AC Jennyfer Flattery, PA   40 mg at 10/05/22 0836    rOPINIRole (REQUIP) tablet 2 mg  2 mg Oral Nightly Jennyfer Flattery, PA   2 mg at 10/04/22 2009    sertraline (ZOLOFT) tablet 50 mg  50 mg Oral Daily Jennyfer Flattery, PA   50 mg at 10/05/22 7939    sodium chloride flush 0.9 % injection 5-40 mL  5-40 mL IntraVENous 2 times per day Jennyfer Flattery, PA   10 mL at 10/05/22 0844    sodium chloride flush 0.9 % injection 5-40 mL  5-40 mL IntraVENous PRN Jennyfer Flattery, PA        0.9 % sodium chloride infusion   IntraVENous PRN Jennyfer Flattery, PA        ondansetron (ZOFRAN-ODT) disintegrating tablet 4 mg  4 mg Oral Q8H PRN Jennyfer Flattery, PA        Or    ondansetron [facility]) injection 4 mg  4 mg IntraVENous Q6H PRN Jennyfer Flattery, PA        polyethylene glycol (GLYCOLAX) packet 17 g  17 g Oral Daily PRN Jennyfer Flattery, PA        acetaminophen (TYLENOL) tablet 1,000 mg  1,000 mg Oral 3 times per day Jennyfer Flattery, PA   1,000 mg at 10/05/22 5086       Allergies:     Allergies   Allergen Reactions    Codeine Nausea And Vomiting       Problem List:    Patient Active Problem List   Diagnosis Code    Cervical spondylosis M47.812    Type 2 diabetes mellitus without complication, without long-term current use of insulin ([facility]) E11.9    Hypertension I10    Abnormal auditory perception H93.299    Nasal polyps J33.9    Osteoarthritis of left knee M17.12    Osteoarthritis of right knee M17.11    DIEGO (nonalcoholic steatohepatitis) K75.81    Vitamin D deficiency E55.9    Iron deficiency anemia D50.9    Dyslipidemia E78.5    Trochanteric bursitis M70.60    Chronic left shoulder pain M25.512, G89.29    Restless legs syndrome G25.81    Generalized anxiety disorder F41.1    Toxic metabolic encephalopathy G92.8    Class 2 obesity in adult E66.9    Leg edema R60.0    Recurrent major depressive disorder (HCC) F33.9    Dermatitis L30.9    Retrolisthesis of vertebrae M43.10    Bilateral carpal tunnel syndrome G56.03    Intention tremor G25.2    Sciatica M54.30    Back pain with radiation M54.9       Past Medical History:        Diagnosis Date    Cervical spondylosis 2009    c-4 c-5, c-5 c-6, c-6 c-7    Generalized anxiety disorder 2020    Hyperlipidemia     Hypertension     Lumbar radiculopathy     DIEGO (nonalcoholic steatohepatitis) 10/12/2014    Obesity     Osteoarthritis of left knee 2014    Recurrent major depressive disorder ([address] 07/15/2022    Restless legs syndrome     TIA (transient ischemic attack)     no residual symptoms    Type II or unspecified type diabetes mellitus without mention of complication, not stated as uncontrolled        Past Surgical History:        Procedure Laterality Date    BACK INJECTION Left 10/03/2022    SI Joint injection     SECTION      [facility]ONOSCOPY  2012    [facility]    HYSTERECTOMY, TOTAL ABDOMINAL (CERVIX REMOVED)      Polymenorrhagia    SHOULDER SURGERY Left 2021    SHOULDER MANIPULATION WITH PRE OP INTERSCALENE BLOCK and intraop injection performed by Shy Rod DO at [address] 10/3/2022    LEFT SI JOINT INJECTION performed by Yoshi Albright MD at [address] [place] [address]  2012    [facility]       Social History:    Social History     Tobacco Use    Smoking status: Never    Smokeless tobacco: Never   Substance Use Topics    Alcohol use: Yes     Alcohol/week: 0.0 standard drinks     Comment: rarely/ 4 times a year                                Counseling given: Not Answered      Vital Signs (Current): There were no vitals filed for this visit.                                            BP Readings from Last 3 Encounters: 10/05/22 (!) 148/82   09/22/22 127/86   08/25/22 (!) 143/84       NPO Status:                                                                                 BMI:   Wt Readings from Last 3 Encounters:   10/03/22 190 lb (86.2 kg)   09/22/22 188 lb (85.3 kg)   08/25/22 188 lb 12.8 oz (85.6 kg)     There is no height or weight on file to calculate BMI.    CBC:   Lab Results   Component Value Date/Time    WBC 14.6 10/04/2022 12:52 PM    RBC 4.30 10/04/2022 12:52 PM    HGB 11.9 10/04/2022 12:52 PM    HCT 38.0 10/04/2022 12:52 PM    MCV 88.4 10/04/2022 12:52 PM    RDW 13.8 10/04/2022 12:52 PM     10/04/2022 12:52 PM       CMP:   Lab Results   Component Value Date/Time     10/04/2022 12:52 PM    K 4.8 10/04/2022 12:52 PM     10/04/2022 12:52 PM    CO2 23 10/04/2022 12:52 PM    BUN 41 10/04/2022 12:52 PM    CREATININE 1.24 10/04/2022 12:52 PM    GFRAA >60 07/06/2022 03:40 PM    LABGLOM 50 10/04/2022 12:52 PM    GLUCOSE 104 10/04/2022 12:52 PM    GLUCOSE 137 05/19/2012 12:05 PM    PROT 7.0 10/04/2022 12:52 PM    CALCIUM 9.3 10/04/2022 12:52 PM    BILITOT 0.3 10/04/2022 12:52 PM    ALKPHOS 126 10/04/2022 12:52 PM    AST 35 10/04/2022 12:52 PM    ALT 58 10/04/2022 12:52 PM       POC Tests:   Recent Labs     10/05/22  1040   POCGLU 104       Coags:   Lab Results   Component Value Date/Time    PROTIME 9.4 10/04/2022 12:52 PM    INR 0.9 10/04/2022 12:52 PM    APTT 20.7 10/04/2022 12:52 PM       HCG (If Applicable): No results found for: PREGTESTUR, PREGSERUM, HCG, HCGQUANT     ABGs: No results found for: PHART, PO2ART, AFV2ZDI, JTH7FLE, BEART, I9FKEYAJ     Type & Screen (If Applicable):  No results found for: LABABO, LABRH    Drug/Infectious Status (If Applicable):  Lab Results   Component Value Date/Time    HEPCAB NONREACTIVE 01/15/2014 09:54 AM       COVID-19 Screening (If Applicable):   Lab Results   Component Value Date/Time    COVID19 Not Detected 09/30/2022 08:19 PM    COVID19 DETECTED 12/06/2021 08:20 AM    COVID19 Not Detected 12/16/2020 07:45 PM         Anesthesia Evaluation  Patient summary reviewed and Nursing notes reviewed no history of anesthetic complications:   Airway: Mallampati: II  TM distance: >3 FB   Neck ROM: limited  Mouth opening: > = 3 FB   Dental: normal exam         Pulmonary:Negative Pulmonary ROS and normal exam                               Cardiovascular:  Exercise tolerance: poor (<4 METS),   (+) hypertension:, hyperlipidemia    (-) pacemaker, valvular problems/murmurs, past MI, CAD, CABG/stent, dysrhythmias,  angina,  CHF, orthopnea, PND,  PRABHAKAR and no pulmonary hypertension    ECG reviewed      Echocardiogram reviewed               ROS comment: -EKG - SR @ 75     Neuro/Psych:   (+) neuromuscular disease:, TIA, psychiatric history:depression/anxiety              ROS comment: -TIA - 10 YEARS AGO- no residual symptoms  -DDD - CERVICAL, POOR NECK MOBILITY GI/Hepatic/Renal:   (+) GERD:, liver disease:, morbid obesity         ROS comment: -NON-ALCOHOLIC STEATOHEPATITIS. Endo/Other:    (+) DiabetesType II DM, , : arthritis:., .                  ROS comment: -NPO AFTER MIDNIGHT  -ALLERGIES - CODEINE Abdominal:             Vascular: negative vascular ROS. Other Findings:             Anesthesia Plan      MAC     ASA 3       Induction: intravenous. MIPS: Postoperative opioids intended and Prophylactic antiemetics administered. Anesthetic plan and risks discussed with patient. Plan discussed with CRNA.                     Whitney Pimentel, ORLY - RAYMUNDO   10/5/2022

## 2022-10-05 NOTE — PROGRESS NOTES
Neurosurgery CALEB/Resident    Daily Progress Note   Chief Complaint   Patient presents with    Back Pain     \"Sciatica back pain\"      10/5/2022  11:24 AM    Chart reviewed. No acute events overnight. No new complaints. Vitals:    10/04/22 0430 10/04/22 0726 10/04/22 1919 10/05/22 0714   BP:  138/80 127/72 (!) 148/82   Pulse:  75 80 71   Resp:  16 22 16   Temp:  98.7 °F (37.1 °C) 97.7 °F (36.5 °C) 98.1 °F (36.7 °C)   TempSrc:  Temporal Oral Temporal   SpO2: 93% 94% 98% 98%   Weight:       Height:             PE: AOx3   Motor   L deltoid 5/5; R deltoid 5/5  L biceps 5/5; R biceps 5/5  L triceps 5/5; R triceps 5/5  L wrist extension 5/5; R wrist extension 5/5  L intrinsics 5/5; R intrinsics 5/5      L iliopsoas 4/5 , R iliopsoas 5/5  L quadriceps 4/5; R quadriceps 5/5  L Dorsiflexion 4/5; R dorsiflexion 5/5  L Plantarflexion 4/5; R plantarflexion 5/5  L EHL 4/5; R EHL 5/5     Sensation diminished to light touch      Lab Results   Component Value Date    WBC 14.6 (H) 10/04/2022    HGB 11.9 10/04/2022    HCT 38.0 10/04/2022     10/04/2022    CHOL 154 06/24/2021    TRIG 217 (H) 06/24/2021    HDL 59 06/24/2021    LDLDIRECT 156 (H) 05/20/2017    ALT 58 (H) 10/04/2022    AST 35 (H) 10/04/2022     10/04/2022    K 4.8 10/04/2022     10/04/2022    CREATININE 1.24 (H) 10/04/2022    BUN 41 (H) 10/04/2022    CO2 23 10/04/2022    TSH 1.37 07/06/2022    INR 0.9 10/04/2022    GLUF 262 (H) 09/10/2018    LABA1C 6.9 08/25/2022    LABMICR Can not be calculated 09/22/2022    .6 (H) 06/06/2022    SEDRATE 14 08/05/2020       A/P  65 y.o. female with left lower back pain radiating to left lower extremity. History disc bulge at L5-S1 with concern for encroachment at S1 nerve root.                      - npo midnight for left S1 nerve root block in am              - PT/OT eval and treat              - Activity as tolerated   - Neuro checks per floor protocol  - consent in chart       Please contact neurosurgery with any changes in patients neurologic status.        Patti Olea PA-C  10/5/22  11:24 AM

## 2022-10-05 NOTE — PROGRESS NOTES
OBS/CDU   RESIDENT NOTE      Patients PCP is:  Gloria Childers MD        SUBJECTIVE      No acute events overnight. Has been able to tolerate a full diet without nausea or vomiting. The patient is urinating on his own and is passing flatus. Denies fever, chills, nausea, vomiting, chest pain, shortness of breath, abdominal pain, focal weakness, numbness, tingling, urinary/bowel symptoms, vision changes, visual hallucinations, or headache. This morning the patient was in no acute distress and in mild discomfort while laying in bed. Patient states that she has not achieved any relief following the sacroiliac injection done with Dr. Andres Orourke. This morning neurosurgery came and assessed the patient and gave her options for a intraforaminal injection at S1 or a discectomy. Patient chose to try the injection first.    Is scheduled to go to the OR with neurosurgery tomorrow for the intraforaminal injection. She will have her perioperative labs and orders placed by the surgical team.    PHYSICAL EXAM      General: NAD, AO X 3  Heent: EMOI, PERRL  Neck: SUPPLE, NO JVD  Cardiovascular: RRR, S1S2  Pulmonary: CTAB, NO SOB  Abdomen: SOFT, NTTP, ND, +BS  Extremities: +2/4 PULSES DISTAL, NO SWELLING, pain with movement of the left lower extremity, decreased muscle strength in comparison to right due to pain. Patient able to flex hip and knee, extend knee, dorsiflex and plantarflex foot  Neuro / Psych: NO NUMBNESS OR TINGLING, MENTATION AT BASELINE    PERTINENT TEST /EXAMS      I have reviewed all available laboratory results.     MEDICATIONS CURRENT   glucose chewable tablet 16 g, PRN  dextrose bolus 10% 125 mL, PRN   Or  dextrose bolus 10% 250 mL, PRN  glucagon (rDNA) injection 1 mg, PRN  dextrose 10 % infusion, Continuous PRN  cefTRIAXone (ROCEPHIN) 1,000 mg in sterile water 10 mL IV syringe, Once  cyclobenzaprine (FLEXERIL) tablet 10 mg, TID PRN  Empagliflozin-metFORMIN HCl 5-1000 MG TABS 1 tablet (Patient Supplied), BID  oxyCODONE (ROXICODONE) immediate release tablet 10 mg, Q4H PRN  lidocaine 4 % external patch 1 patch, Daily  atorvastatin (LIPITOR) tablet 40 mg, Nightly  busPIRone (BUSPAR) tablet 10 mg, TID  celecoxib (CELEBREX) capsule 100 mg, BID  furosemide (LASIX) tablet 10 mg, Every Other Day  gabapentin (NEURONTIN) capsule 300 mg, TID  glipiZIDE (GLUCOTROL) tablet 10 mg, BID AC  lisinopril (PRINIVIL;ZESTRIL) tablet 40 mg, Daily  magnesium oxide (MAG-OX) tablet 400 mg, Daily  pantoprazole (PROTONIX) tablet 40 mg, BID AC  rOPINIRole (REQUIP) tablet 2 mg, Nightly  sertraline (ZOLOFT) tablet 50 mg, Daily  sodium chloride flush 0.9 % injection 5-40 mL, 2 times per day  sodium chloride flush 0.9 % injection 5-40 mL, PRN  0.9 % sodium chloride infusion, PRN  ondansetron (ZOFRAN-ODT) disintegrating tablet 4 mg, Q8H PRN   Or  ondansetron (ZOFRAN) injection 4 mg, Q6H PRN  polyethylene glycol (GLYCOLAX) packet 17 g, Daily PRN  acetaminophen (TYLENOL) tablet 1,000 mg, 3 times per day      All medication charted and reviewed. CONSULTS      IP CONSULT TO NEUROSURGERY    ASSESSMENT/PLAN       Gadiel Pathak is a 65 y.o. female who presents with left lower back pain/sciatica pain. Neurosurgery consulted:  MRI lumbar spine without contrast:  Degenerative change most pronounced in the lower lumbar spine with mild right foraminal narrowing at L5-S1 and lateral recess narrowing that is worse on the right compared to the left. Status post sacroiliac joint injection under fluoroscopy. Per neurosurgery, if patient has not relief they will consider taking operative treatment for either L5-S1 disc rupture or perform S1 foraminal injection. Patient chose to try nerve root injection; scheduled procedure time for 10/6  Physical therapy consulted: They recommend 5 to 6 weeks of outpatient therapy for strengthening and functional mobility.   Continue home medications and pain control  Monitor vitals, labs, and imaging  DISPO: pending procedure date/time with neurosurgery and clinical improvement    --  Patricia Bell DO  Emergency Medicine Resident Physician     This dictation was generated by voice recognition computer software. Although all attempts are made to edit the dictation for accuracy, there may be errors in the transcription that are not intended.

## 2022-10-05 NOTE — PROGRESS NOTES
[address]  CDU / OBSERVATION ENCOUNTER  ATTENDING NOTE       I performed a history and physical examination of the patient and discussed management with the resident. I reviewed the residents note and agree with the documented findings and plan of care. Any areas of disagreement are noted on the chart. I was personally present for the key portions of any procedures. I have documented in the chart those procedures where I was not present during the key portions. I have reviewed the nurses notes. I agree with the chief complaint, past medical history, past surgical history, allergies, medications, social and family history as documented unless otherwise noted below. The Family history, social history, and ROS are effectively unchanged since admission unless noted elsewhere in the chart. The patient is a 65year old female with history of L5-S1 disc bulge that was admitted for left lower back pain that radiates into her left lower extremity. There is concern for encroachment at the S1 nerve root. Neurosurgery has given the patient different surgical options and today she is scheduled to have a left S1 nerve root injection. This morning she is complaining of persistent pain. We will re-evaluate her post op.      Ching Durbin DO  Attending Emergency Physician

## 2022-10-05 NOTE — PLAN OF CARE
Problem: Chronic Conditions and Co-morbidities  Goal: Patient's chronic conditions and co-morbidity symptoms are monitored and maintained or improved  10/4/2022 2359 by Richarda Bernheim, RN  Outcome: Progressing  10/4/2022 1746 by Yohannes Jean Baptiste RN  Outcome: Progressing     Problem: Pain  Goal: Verbalizes/displays adequate comfort level or baseline comfort level  10/4/2022 2359 by Richarda Bernheim, RN  Outcome: Progressing  10/4/2022 1746 by Yohannes Jean Baptiste RN  Outcome: Progressing     Problem: Safety - Adult  Goal: Free from fall injury  10/4/2022 2359 by Richarda Bernheim, RN  Outcome: Progressing  10/4/2022 1746 by Yohannes Jean Baptiste RN  Outcome: Progressing     Problem: ABCDS Injury Assessment  Goal: Absence of physical injury  10/4/2022 2359 by Richarda Bernheim, RN  Outcome: Progressing  10/4/2022 1746 by Yohannes Jean Baptiste RN  Outcome: Progressing     Problem: Discharge Planning  Goal: Discharge to home or other facility with appropriate resources  10/4/2022 2359 by Richarda Bernheim, RN  Outcome: Progressing  10/4/2022 1746 by Yohannes Jean Baptiste RN  Outcome: Progressing

## 2022-10-06 ENCOUNTER — ANESTHESIA (OUTPATIENT)
Dept: OPERATING ROOM | Age: 62
DRG: 552 | End: 2022-10-06

## 2022-10-06 ENCOUNTER — APPOINTMENT (OUTPATIENT)
Dept: GENERAL RADIOLOGY | Age: 62
DRG: 552 | End: 2022-10-06

## 2022-10-06 LAB
GLUCOSE BLD-MCNC: 107 MG/DL (ref 65–105)
GLUCOSE BLD-MCNC: 111 MG/DL (ref 65–105)
GLUCOSE BLD-MCNC: 122 MG/DL (ref 65–105)
GLUCOSE BLD-MCNC: 222 MG/DL (ref 65–105)
GLUCOSE BLD-MCNC: 239 MG/DL (ref 65–105)
GLUCOSE BLD-MCNC: 95 MG/DL (ref 65–105)

## 2022-10-06 PROCEDURE — 97530 THERAPEUTIC ACTIVITIES: CPT

## 2022-10-06 PROCEDURE — 2580000003 HC RX 258: Performed by: REGISTERED NURSE

## 2022-10-06 PROCEDURE — 1200000000 HC SEMI PRIVATE

## 2022-10-06 PROCEDURE — 3600000012 HC SURGERY LEVEL 2 ADDTL 15MIN: Performed by: NEUROLOGICAL SURGERY

## 2022-10-06 PROCEDURE — 6370000000 HC RX 637 (ALT 250 FOR IP): Performed by: REGISTERED NURSE

## 2022-10-06 PROCEDURE — 94761 N-INVAS EAR/PLS OXIMETRY MLT: CPT

## 2022-10-06 PROCEDURE — 2700000000 HC OXYGEN THERAPY PER DAY

## 2022-10-06 PROCEDURE — 2580000003 HC RX 258

## 2022-10-06 PROCEDURE — 97535 SELF CARE MNGMENT TRAINING: CPT

## 2022-10-06 PROCEDURE — 3600000002 HC SURGERY LEVEL 2 BASE: Performed by: NEUROLOGICAL SURGERY

## 2022-10-06 PROCEDURE — 6360000002 HC RX W HCPCS

## 2022-10-06 PROCEDURE — 6360000004 HC RX CONTRAST MEDICATION: Performed by: NEUROLOGICAL SURGERY

## 2022-10-06 PROCEDURE — 3700000000 HC ANESTHESIA ATTENDED CARE: Performed by: NEUROLOGICAL SURGERY

## 2022-10-06 PROCEDURE — 3E0T3GC INTRODUCTION OF OTHER THERAPEUTIC SUBSTANCE INTO PERIPHERAL NERVES AND PLEXI, PERCUTANEOUS APPROACH: ICD-10-PCS | Performed by: NEUROLOGICAL SURGERY

## 2022-10-06 PROCEDURE — 82947 ASSAY GLUCOSE BLOOD QUANT: CPT

## 2022-10-06 PROCEDURE — 6360000002 HC RX W HCPCS: Performed by: NEUROLOGICAL SURGERY

## 2022-10-06 PROCEDURE — 7100000001 HC PACU RECOVERY - ADDTL 15 MIN: Performed by: NEUROLOGICAL SURGERY

## 2022-10-06 PROCEDURE — 6370000000 HC RX 637 (ALT 250 FOR IP): Performed by: PHYSICIAN ASSISTANT

## 2022-10-06 PROCEDURE — 7100000000 HC PACU RECOVERY - FIRST 15 MIN: Performed by: NEUROLOGICAL SURGERY

## 2022-10-06 PROCEDURE — 3209999900 FLUORO FOR SURGICAL PROCEDURES

## 2022-10-06 PROCEDURE — 3700000001 HC ADD 15 MINUTES (ANESTHESIA): Performed by: NEUROLOGICAL SURGERY

## 2022-10-06 PROCEDURE — 97164 PT RE-EVAL EST PLAN CARE: CPT

## 2022-10-06 PROCEDURE — 2500000003 HC RX 250 WO HCPCS: Performed by: NEUROLOGICAL SURGERY

## 2022-10-06 PROCEDURE — 2709999900 HC NON-CHARGEABLE SUPPLY: Performed by: NEUROLOGICAL SURGERY

## 2022-10-06 RX ORDER — FENTANYL CITRATE 50 UG/ML
25 INJECTION, SOLUTION INTRAMUSCULAR; INTRAVENOUS EVERY 5 MIN PRN
Status: DISCONTINUED | OUTPATIENT
Start: 2022-10-06 | End: 2022-10-06 | Stop reason: HOSPADM

## 2022-10-06 RX ORDER — SODIUM CHLORIDE 0.9 % (FLUSH) 0.9 %
5-40 SYRINGE (ML) INJECTION PRN
Status: DISCONTINUED | OUTPATIENT
Start: 2022-10-06 | End: 2022-10-06 | Stop reason: HOSPADM

## 2022-10-06 RX ORDER — MORPHINE SULFATE 2 MG/ML
2 INJECTION, SOLUTION INTRAMUSCULAR; INTRAVENOUS EVERY 5 MIN PRN
Status: DISCONTINUED | OUTPATIENT
Start: 2022-10-06 | End: 2022-10-06 | Stop reason: HOSPADM

## 2022-10-06 RX ORDER — SODIUM CHLORIDE 9 MG/ML
INJECTION, SOLUTION INTRAVENOUS PRN
Status: DISCONTINUED | OUTPATIENT
Start: 2022-10-06 | End: 2022-10-06 | Stop reason: HOSPADM

## 2022-10-06 RX ORDER — LIDOCAINE HYDROCHLORIDE 10 MG/ML
INJECTION, SOLUTION EPIDURAL; INFILTRATION; INTRACAUDAL; PERINEURAL PRN
Status: DISCONTINUED | OUTPATIENT
Start: 2022-10-06 | End: 2022-10-06 | Stop reason: HOSPADM

## 2022-10-06 RX ORDER — DEXAMETHASONE SODIUM PHOSPHATE 10 MG/ML
INJECTION INTRAMUSCULAR; INTRAVENOUS PRN
Status: DISCONTINUED | OUTPATIENT
Start: 2022-10-06 | End: 2022-10-06 | Stop reason: ALTCHOICE

## 2022-10-06 RX ORDER — SODIUM CHLORIDE, SODIUM LACTATE, POTASSIUM CHLORIDE, CALCIUM CHLORIDE 600; 310; 30; 20 MG/100ML; MG/100ML; MG/100ML; MG/100ML
INJECTION, SOLUTION INTRAVENOUS CONTINUOUS PRN
Status: DISCONTINUED | OUTPATIENT
Start: 2022-10-06 | End: 2022-10-06 | Stop reason: SDUPTHER

## 2022-10-06 RX ORDER — DIPHENHYDRAMINE HYDROCHLORIDE 50 MG/ML
12.5 INJECTION INTRAMUSCULAR; INTRAVENOUS
Status: DISCONTINUED | OUTPATIENT
Start: 2022-10-06 | End: 2022-10-06 | Stop reason: HOSPADM

## 2022-10-06 RX ORDER — MIDAZOLAM HYDROCHLORIDE 1 MG/ML
INJECTION INTRAMUSCULAR; INTRAVENOUS PRN
Status: DISCONTINUED | OUTPATIENT
Start: 2022-10-06 | End: 2022-10-06 | Stop reason: SDUPTHER

## 2022-10-06 RX ORDER — LIDOCAINE HYDROCHLORIDE 10 MG/ML
INJECTION, SOLUTION EPIDURAL; INFILTRATION; INTRACAUDAL; PERINEURAL PRN
Status: DISCONTINUED | OUTPATIENT
Start: 2022-10-06 | End: 2022-10-06 | Stop reason: ALTCHOICE

## 2022-10-06 RX ORDER — FENTANYL CITRATE 50 UG/ML
INJECTION, SOLUTION INTRAMUSCULAR; INTRAVENOUS PRN
Status: DISCONTINUED | OUTPATIENT
Start: 2022-10-06 | End: 2022-10-06 | Stop reason: SDUPTHER

## 2022-10-06 RX ORDER — ONDANSETRON 2 MG/ML
4 INJECTION INTRAMUSCULAR; INTRAVENOUS
Status: DISCONTINUED | OUTPATIENT
Start: 2022-10-06 | End: 2022-10-06 | Stop reason: HOSPADM

## 2022-10-06 RX ORDER — SODIUM CHLORIDE 0.9 % (FLUSH) 0.9 %
5-40 SYRINGE (ML) INJECTION EVERY 12 HOURS SCHEDULED
Status: DISCONTINUED | OUTPATIENT
Start: 2022-10-06 | End: 2022-10-06 | Stop reason: HOSPADM

## 2022-10-06 RX ADMIN — FENTANYL CITRATE 25 MCG: 50 INJECTION, SOLUTION INTRAMUSCULAR; INTRAVENOUS at 08:52

## 2022-10-06 RX ADMIN — MIDAZOLAM 2 MG: 1 INJECTION INTRAMUSCULAR; INTRAVENOUS at 08:43

## 2022-10-06 RX ADMIN — GABAPENTIN 300 MG: 300 CAPSULE ORAL at 14:09

## 2022-10-06 RX ADMIN — CELECOXIB 100 MG: 100 CAPSULE ORAL at 21:17

## 2022-10-06 RX ADMIN — ACETAMINOPHEN 1000 MG: 500 TABLET ORAL at 14:09

## 2022-10-06 RX ADMIN — SODIUM CHLORIDE, POTASSIUM CHLORIDE, SODIUM LACTATE AND CALCIUM CHLORIDE: 600; 310; 30; 20 INJECTION, SOLUTION INTRAVENOUS at 08:35

## 2022-10-06 RX ADMIN — GLIPIZIDE 10 MG: 10 TABLET ORAL at 16:20

## 2022-10-06 RX ADMIN — PANTOPRAZOLE SODIUM 40 MG: 40 TABLET, DELAYED RELEASE ORAL at 06:30

## 2022-10-06 RX ADMIN — CYCLOBENZAPRINE 10 MG: 10 TABLET, FILM COATED ORAL at 23:43

## 2022-10-06 RX ADMIN — ROPINIROLE HYDROCHLORIDE 2 MG: 1 TABLET, FILM COATED ORAL at 21:17

## 2022-10-06 RX ADMIN — SODIUM CHLORIDE, PRESERVATIVE FREE 10 ML: 5 INJECTION INTRAVENOUS at 23:43

## 2022-10-06 RX ADMIN — DESMOPRESSIN ACETATE 40 MG: 0.2 TABLET ORAL at 21:17

## 2022-10-06 RX ADMIN — ACETAMINOPHEN 1000 MG: 500 TABLET ORAL at 06:30

## 2022-10-06 RX ADMIN — FENTANYL CITRATE 25 MCG: 50 INJECTION, SOLUTION INTRAMUSCULAR; INTRAVENOUS at 08:50

## 2022-10-06 RX ADMIN — BUSPIRONE HYDROCHLORIDE 10 MG: 10 TABLET ORAL at 14:09

## 2022-10-06 RX ADMIN — GABAPENTIN 300 MG: 300 CAPSULE ORAL at 21:17

## 2022-10-06 RX ADMIN — PANTOPRAZOLE SODIUM 40 MG: 40 TABLET, DELAYED RELEASE ORAL at 16:20

## 2022-10-06 RX ADMIN — OXYCODONE 10 MG: 5 TABLET ORAL at 20:49

## 2022-10-06 RX ADMIN — BUSPIRONE HYDROCHLORIDE 10 MG: 10 TABLET ORAL at 21:17

## 2022-10-06 RX ADMIN — OXYCODONE 10 MG: 5 TABLET ORAL at 16:21

## 2022-10-06 RX ADMIN — ACETAMINOPHEN 1000 MG: 500 TABLET ORAL at 20:49

## 2022-10-06 RX ADMIN — GLIPIZIDE 10 MG: 10 TABLET ORAL at 07:02

## 2022-10-06 RX ADMIN — OXYCODONE 10 MG: 5 TABLET ORAL at 03:57

## 2022-10-06 ASSESSMENT — PAIN - FUNCTIONAL ASSESSMENT
PAIN_FUNCTIONAL_ASSESSMENT: 0-10
PAIN_FUNCTIONAL_ASSESSMENT: PREVENTS OR INTERFERES SOME ACTIVE ACTIVITIES AND ADLS

## 2022-10-06 ASSESSMENT — PAIN SCALES - GENERAL
PAINLEVEL_OUTOF10: 8
PAINLEVEL_OUTOF10: 7
PAINLEVEL_OUTOF10: 9
PAINLEVEL_OUTOF10: 7

## 2022-10-06 ASSESSMENT — PAIN DESCRIPTION - DESCRIPTORS: DESCRIPTORS: ACHING;DISCOMFORT;NUMBNESS

## 2022-10-06 NOTE — PROGRESS NOTES
Occupational [address]  Occupational Therapy Not Seen Note    DATE: 10/6/2022    NAME: Arjun Gill  MRN: [id number]   : 1960      Patient not seen this date for Occupational Therapy due to: Nerve root injection.     Next Scheduled Treatment: 10/7/22    Electronically signed by LUCILLE King on 10/6/2022 at 12:06 PM

## 2022-10-06 NOTE — ANESTHESIA POSTPROCEDURE EVALUATION
Department of Anesthesiology  Postprocedure Note    Patient: Shayna Morris  MRN: [id number]  YOB: 1960  Date of evaluation: 10/6/2022      Procedure Summary     Date: 10/06/22 Room / Location: [address]    Anesthesia Start: 0419 Anesthesia Stop: 5895    Procedure: S1 NERVE ROOT INJECTION (Left) Diagnosis:       Sciatica of left side      (SCIATICA)    Surgeons: Luiza Fournier MD Responsible Provider: Ly Wayne MD    Anesthesia Type: MAC ASA Status: 3          Anesthesia Type: No value filed.     Elise Phase I: Elise Score: 10    Elise Phase II: Elise Score: 9      Anesthesia Post Evaluation    Patient location during evaluation: PACU  Patient participation: complete - patient participated  Level of consciousness: awake  Airway patency: patent  Nausea & Vomiting: no nausea and no vomiting  Complications: no  Cardiovascular status: blood pressure returned to baseline  Respiratory status: acceptable  Hydration status: euvolemic  Comments: BP (!) 140/70   Pulse 64   Temp 97.4 °F (36.3 °C) (Oral)   Resp 16   Ht 5' 3\" (1.6 m)   Wt 190 lb (86.2 kg)   SpO2 96%   BMI 33.66 kg/m²

## 2022-10-06 NOTE — PROGRESS NOTES
Occupational Therapy  Facility/Department: [address] ORTHO/MED SURG  Occupational Therapy Daily Treatment Note    Name: Iliana Blackwell  : 1960  MRN: [id number]  Date of Service: 10/6/2022    Discharge Recommendations:  Patient would benefit from continued therapy after discharge  OT Equipment Recommendations  ADL Assistive Devices: Transfer Tub Bench;Reacher;Sock-Aid Soft;Long-handled Sponge (Reacher)       Patient Diagnosis(es): The encounter diagnosis was Sciatica of left side. Past Medical History:  has a past medical history of Cervical spondylosis, Generalized anxiety disorder, Hyperlipidemia, Hypertension, Lumbar radiculopathy, DIEGO (nonalcoholic steatohepatitis), Obesity, Osteoarthritis of left knee, [facility] Restless legs syndrome, TIA (transient ischemic attack), and Type II or unspecified type diabetes mellitus without mention of complication, not stated as uncontrolled. Past Surgical History:  has a past surgical history that includes Upper gastrointestinal endoscopy (2012); Colonoscopy (2012);  section; Hysterectomy, total abdominal (); shoulder surgery (Left, 2021); Back Injection (Left, 10/03/2022); Spine surgery (Left, 10/03/2022); and Injection (Left, 10/06/2022). Treatment Diagnosis: Sciatica      Assessment   Performance deficits / Impairments: Decreased functional mobility ; Decreased ADL status; Decreased ROM; Decreased strength;Decreased sensation;Decreased balance;Decreased high-level IADLs;Decreased coordination  Assessment: Pt would benefit from continued acute care and post acute care OT to address above listed deficits.   Treatment Diagnosis: Sciatica  Prognosis: Good  Activity Tolerance  Activity Tolerance: Patient Tolerated treatment well;Patient limited by pain        Plan   Occupational Therapy Plan  Times Per Week: 3-4 x/wk  Current Treatment Recommendations: Strengthening, Balance training, Functional mobility training, Pain management, Safety education & training, Patient/Caregiver education & training, Equipment evaluation, education, & procurement, Self-Care / ADL, Home management training, Coordination training     Restrictions  Restrictions/Precautions  Restrictions/Precautions: Up as Tolerated, Fall Risk  Required Braces or Orthoses?: No  Position Activity Restriction  Other position/activity restrictions: L SI joint injection 10/03/2022. Pt reports hx of cubital tunnel with plans for release this month    Subjective   General  Patient assessed for rehabilitation services?: Yes  Family / Caregiver Present: No  General Comment  Comments: RN ok'd pt for OT tx this date. Pt agreeable to session and pleasant/cooperative throughout. Pt reports 7/10 pain to R lower back, R hip, R buttock that occasionally shoots down RLE. Pt had a Nerve Root Injection on left side d/t sciatica and pain is now in Right side. RN notified and Dr notified by RN. .     Objective   O2 Device: None (Room air)  Safety Devices  Type of Devices: Nurse notified;Call light within reach;Gait belt;Left in chair;Patient at risk for falls  Bed Mobility Training  Bed Mobility Training: Yes  Overall Level of Assistance: Contact-guard assistance  Rolling: Stand-by assistance  Supine to Sit: Contact-guard assistance  Sit to Supine: Stand-by assistance  Scooting: Stand-by assistance  Balance  Sitting: Without support (seated in recliner and on EOB ~45 min SBA)  Standing: With support (stood ~10 min w/CGA using RW)  Transfer Training  Transfer Training: Yes  Overall Level of Assistance: Contact-guard assistance; Adaptive equipment  Interventions: Verbal cues  Sit to Stand: Contact-guard assistance; Adaptive equipment  Stand to Sit: Contact-guard assistance; Adaptive equipment  Bed to Chair: Contact-guard assistance  Gait  Overall Level of Assistance: Contact-guard assistance; Adaptive equipment; Additional time  Interventions: Safety awareness training;Verbal cues  Assistive Device: Walker, rolling        ADL  Feeding: Setup; Increased time to complete;Minimal assistance (assist needed to open containers, cut food, able to feed self d/t left hand weakness)  Grooming: Setup; Increased time to complete;Stand by assistance (wash face, brush hair and teeth)  UE Bathing: Setup; Increased time to complete;Stand by assistance (assist to wash back)  LE Bathing: Moderate assistance;Setup; Increased time to complete (assist to wash lower legs and feet)  UE Dressing: Stand by assistance;Setup; Increased time to complete (assist to snap and tie gown)  LE Dressing: Setup; Increased time to complete;Maximum assistance (assist to doff/don footies)  Toileting: Unable to assess    Pt up in chair upon arrival. STS and func mob in room for bed mob and functional transfers in room w/CGA using RW. Pt had no LOB but is C/O pain in right side of sciatic and no pain in left side after injection today. Pt transferred back to recliner and set up for self care (see above for LOF). Pt needs increased time and assist d/t pain and limited ROM to LE's. Issued pink resistant foam block for /pinch strengthening exercises to improve functional use for ADL's. Pt demo exercises without difficulty. Activity Tolerance  Activity Tolerance: Patient limited by pain; Patient tolerated treatment well        Cognition  Overall Cognitive Status: WFL  Orientation  Overall Orientation Status: Within Functional Limits  Orientation Level: Oriented X4   Education Given To: Patient  Education Provided: Role of Therapy;Plan of Care;Precautions; ADL Adaptive Strategies;Transfer Training;Equipment  Education Provided Comments: Pt ed on OT role, OT POC, safety awareness, transfer training, DME use, exercises for B hand strengthening, ADL adaptive tech, and importance of continued OT. Good return noted.   Education Method: Demonstration;Verbal  Barriers to Learning: None  Education Outcome: Verbalized understanding;Demonstrated understanding     AM-PAC Score  AM-PAC Inpatient Daily Activity Raw Score: 17 (10/06/22 1734)  AM-PAC Inpatient ADL T-Scale Score : 37.26 (10/06/22 1734)  ADL Inpatient CMS 0-100% Score: 50.11 (10/06/22 1734)  ADL Inpatient CMS G-Code Modifier : CK (10/06/22 1734)     Goals  Short Term Goals  Time Frame for Short Term Goals: By discharge, pt will:  Short Term Goal 1: Demo Mod I for functional transfers and functional mobility for engagement in ADLs/IADLs  Short Term Goal 2: Demo Mod I for all ADLs, utilizing adaptive tech PRN  Short Term Goal 3: Engage in B hand strengthening ex with use of foam block ( & pinch) for X10 reps/3 sets for improved functional use in meaningful occupations  Short Term Goal 4: Participate in 5 min [address] task bilaterally with <2 fumbles of items to increase independence in ADLs/IADLs  Short Term Goal 5: Demo 10+ min dynamic standing and reaching outside RICA with uni/bilateral hand release and SUP for participation in daily activities     Therapy Time   Individual Concurrent Group Co-treatment   Time In 1450         Time Out 1545         Minutes 55         Timed Code Treatment Minutes: [address]

## 2022-10-06 NOTE — OP NOTE
Operative Note      Patient: Josse Moe  YOB: 1960  MRN: [id number]    Date of Procedure: 10/6/2022    Pre-Op Diagnosis: SCIATICA    Post-Op Diagnosis:  Left S1 radiculopathy       Procedure(s):  S1 NERVE ROOT INJECTION    Surgeon(s):  Per Naylor MD    Assistant:   * No surgical staff found *    Anesthesia: Monitor Anesthesia Care    Estimated Blood Loss (mL): Minimal    Complications: Other: None    Specimens:   * No specimens in log *    Implants:  * No implants in log *      Drains:   [REMOVED] External Urinary Catheter (Removed)       Findings: Left S1 foramen injected without complication    Detailed Description of Procedure:   Patient brought the operating room placed under IV sedation. She was rolled prone upon the Matthew table all pressure points were padded. Under fluoroscopic guidance a point just lateral to the S1 foramen was identified the skin and subcutaneous tissues were infiltrated with 1% lidocaine. A 22-gauge spinal needle was then inserted into the lateral aspect of the S1 foramen. 1-1/2 cc of Isovue-300 was injected and confirmed the presence of the foramen. 2 cc of lidocaine and 2 cc of Decadron the Decadron was 10 mg/mL was injected into the foramen. No complications were noted.     Electronically signed by Per Naylor MD on 10/6/2022 at 9:15 AM

## 2022-10-06 NOTE — PROGRESS NOTES
OBS/CDU   RESIDENT NOTE      Patients PCP is:  Arya Hawley MD        SUBJECTIVE      Patient seen status post neurosurgical procedure with intraforaminal injection on the left side. Patient states she is now having similar pain on the right side. Does not radiate. Notes small \"lump \"in that area on the right side. No loss of bowel or bladder control, no numbness or tingling. No acute events overnight. Has been able to tolerate a full diet without nausea or vomiting. Denies fever, chills, nausea, vomiting, chest pain, shortness of breath, abdominal pain. PHYSICAL EXAM      General: NAD, AO X 3  Heent: EMOI, PERRL  Neck: SUPPLE, NO JVD  Cardiovascular: RRR, S1S2  Pulmonary: CTAB, NO SOB  Abdomen: SOFT, NTTP, ND, +BS  Extremities: +2/4 PULSES DISTAL, NO SWELLING, tender to palpation just lateral to right side of SI joint, small knot of muscle felt  Neuro / Psych: NO NUMBNESS OR TINGLING, MENTATION AT BASELINE    PERTINENT TEST /EXAMS      I have reviewed all available laboratory results.     MEDICATIONS CURRENT   glucose chewable tablet 16 g, PRN  dextrose bolus 10% 125 mL, PRN   Or  dextrose bolus 10% 250 mL, PRN  glucagon (rDNA) injection 1 mg, PRN  dextrose 10 % infusion, Continuous PRN  cefTRIAXone (ROCEPHIN) 1,000 mg in sterile water 10 mL IV syringe, Once  cyclobenzaprine (FLEXERIL) tablet 10 mg, TID PRN  Empagliflozin-metFORMIN HCl 5-1000 MG TABS 1 tablet (Patient Supplied), BID  oxyCODONE (ROXICODONE) immediate release tablet 10 mg, Q4H PRN  lidocaine 4 % external patch 1 patch, Daily  atorvastatin (LIPITOR) tablet 40 mg, Nightly  busPIRone (BUSPAR) tablet 10 mg, TID  celecoxib (CELEBREX) capsule 100 mg, BID  furosemide (LASIX) tablet 10 mg, Every Other Day  gabapentin (NEURONTIN) capsule 300 mg, TID  glipiZIDE (GLUCOTROL) tablet 10 mg, BID AC  lisinopril (PRINIVIL;ZESTRIL) tablet 40 mg, Daily  magnesium oxide (MAG-OX) tablet 400 mg, Daily  pantoprazole (PROTONIX) tablet 40 mg, BID AC  rOPINIRole (REQUIP) tablet 2 mg, Nightly  sertraline (ZOLOFT) tablet 50 mg, Daily  sodium chloride flush 0.9 % injection 5-40 mL, 2 times per day  sodium chloride flush 0.9 % injection 5-40 mL, PRN  0.9 % sodium chloride infusion, PRN  ondansetron (ZOFRAN-ODT) disintegrating tablet 4 mg, Q8H PRN   Or  ondansetron (ZOFRAN) injection 4 mg, Q6H PRN  polyethylene glycol (GLYCOLAX) packet 17 g, Daily PRN  acetaminophen (TYLENOL) tablet 1,000 mg, 3 times per day        All medication charted and reviewed. CONSULTS      IP CONSULT TO NEUROSURGERY    ASSESSMENT/PLAN       Gil Jackson is a 65 y.o. female who presents with   left lower back pain/sciatica pain. Neurosurgery consulted:  MRI lumbar spine without contrast:  Degenerative change most pronounced in the lower lumbar spine with mild right foraminal narrowing at L5-S1 and lateral recess narrowing that is worse on the right compared to the left. Status post sacroiliac joint injection under fluoroscopy. Per neurosurgery, if patient has not relief they will consider taking operative treatment for either L5-S1 disc rupture or perform S1 foraminal injection. Patient chose to try nerve root injection  PLAN FOR SURGERY TODAY - S1 nerve root block  Physical therapy consulted: They recommend 5 to 6 weeks of outpatient therapy for strengthening and functional mobility. Continue home medications and pain control  Monitor vitals, labs, and imaging  DISPO: pending consults and clinical improvement    --  Vanda Palmer DO  Emergency Medicine Resident Physician     This dictation was generated by voice recognition computer software. Although all attempts are made to edit the dictation for accuracy, there may be errors in the transcription that are not intended.

## 2022-10-06 NOTE — PROGRESS NOTES
Neurosurgery Post op Progress Note      POD# 0    s/p S1 nerve root injection    SUBJECTIVE:      Patient presents with left S1 radiculopathy. Evaluated after returning to her room. States leg pain has resolved.        OBJECTIVE      Physical exam   VITALS:    Vitals:    10/06/22 0937   BP: 103/62   Pulse: 62   Resp: 11   Temp: 96.8 °F (36 °C)   SpO2: 94%     INTAKE:    Intake/Output Summary (Last 24 hours) at 10/6/2022 1034  Last data filed at 10/6/2022 0940  Gross per 24 hour   Intake 540 ml   Output 0 ml   Net 540 ml            Neurological exam   alert, oriented x3, affect appropriate, no focal neurological deficits, and moves all extremities well      Wound   Post op wound:    Dressing is clean/dry/intact with no signs of drainage     ASSESSMENT AND PLAN    65 y.o. female  s/p S1 nerve root injection post op day # 0    - Analgesia: PRN pain meds  - Activity: As tolerated, PT and OT eval today  - DVT prophylaxis: SCDs  - Diet: Advance as tolerated      Electronically signed by ORLY Locke CNP on 10/6/2022 at 10:34 AM

## 2022-10-06 NOTE — PROGRESS NOTES
Physical Therapy        Physical Therapy Cancel Note      DATE: 10/6/2022    NAME: Myrtle Ly  MRN: [id number]   : 1960      Patient not seen this date for Physical Therapy due to:    Surgery/Procedure: S1 NERVE ROOT INJECTION      Electronically signed by Lindajean Sandhoff, PTA on 10/6/2022 at 8:21 AM

## 2022-10-06 NOTE — PROGRESS NOTES
[address]  CDU / OBSERVATION ENCOUNTER  ATTENDING NOTE       I performed a history and physical examination of the patient and discussed management with the resident or midlevel provider. I reviewed the resident or midlevel provider's note and agree with the documented findings and plan of care. Any areas of disagreement are noted on the chart. I was personally present for the key portions of any procedures. I have documented in the chart those procedures where I was not present during the key portions. I have reviewed the nurses notes. I agree with the chief complaint, past medical history, past surgical history, allergies, medications, social and family history as documented unless otherwise noted below. The Family history, social history, and ROS are effectively unchanged since admission unless noted elsewhere in the chart. Patient with complaints of sciatica. Evaluated by neurosurgery. Patient was given choice between intraforaminal injection at S1 or discectomy. Patient chose discectomy. Patient n.p.o. at midnight. Patient for procedure today. For reassessment after procedure regarding ongoing treatment. On my evaluation shortly after the procedure patient stated she was doing well. On the residents reevaluation later patient had ongoing complaints. Will reevaluate with neurosurgery tomorrow.     Jennifer Officer MD  Attending Emergency  Physician

## 2022-10-06 NOTE — PROGRESS NOTES
Physical Therapy  Facility/Department: [address] ORTHO/MED SURG  Physical Therapy Reassessment    Name: Debi Jordan  : 1960  MRN: [id number]  Date of Service: 10/6/2022  Pre-Op Diagnosis: SCIATICA   Post-Op Diagnosis:  Left S1 radiculopathy   Procedure(s):  S1 NERVE ROOT INJECTION  Discharge Recommendations:  Patient would benefit from continued therapy after discharge   PT Equipment Recommendations  Equipment Needed: No      Patient Diagnosis(es): The encounter diagnosis was Sciatica of left side. Past Medical History:  has a past medical history of Cervical spondylosis, Generalized anxiety disorder, Hyperlipidemia, Hypertension, Lumbar radiculopathy, DIEGO (nonalcoholic steatohepatitis), Obesity, Osteoarthritis of left knee, Recurrent major depressive disorder ([facility] Restless legs syndrome, TIA (transient ischemic attack), and Type II or unspecified type diabetes mellitus without mention of complication, not stated as uncontrolled. Past Surgical History:  has a past surgical history that includes Upper gastrointestinal endoscopy (2012); Colonoscopy (2012);  section; Hysterectomy, total abdominal (); shoulder surgery (Left, 2021); Back Injection (Left, 10/03/2022); Spine surgery (Left, 10/03/2022); and Injection (Left, 10/06/2022). Assessment   Body Structures, Functions, Activity Limitations Requiring Skilled Therapeutic Intervention: Decreased functional mobility ; Decreased strength;Decreased endurance; Increased pain  Assessment: The pt ambulated 150 ft with a RW x SBA. She moved slowly with a decreased stride length but ambulated in a safe manner. She reported less pain with mobilization than in the previous session. will continue with gait strengthening  Therapy Prognosis: Good  Decision Making: Medium Complexity  Requires PT Follow-Up: Yes  Activity Tolerance  Activity Tolerance: Patient limited by pain; Patient tolerated treatment well     Plan   Physcial Therapy Plan  General Plan:  (5-6x wk)  Current Treatment Recommendations: Strengthening, Functional mobility training, Transfer training, Stair training, Gait training, Safety education & training, Balance training, Therapeutic activities, Equipment evaluation, education, & procurement, Home exercise program, Patient/Caregiver education & training, Endurance training  Safety Devices  Type of Devices: Nurse notified, Call light within reach, Gait belt, Left in chair  Restraints  Restraints Initially in Place: No     Restrictions  Restrictions/Precautions  Restrictions/Precautions: Up as Tolerated  Required Braces or Orthoses?: No  Position Activity Restriction  Other position/activity restrictions: L SI joint injection 10/03/2022. Pt reports hx of cubital tunnel with plans for release this month     Subjective   General  Patient assessed for rehabilitation services?: Yes  Response To Previous Treatment: Patient with no complaints from previous session. Family / Caregiver Present: No  Follows Commands: Within Functional Limits  Subjective  Subjective: Pt and RN agreeable to PT this afternoon. Pt   upon arrival with c/o 7/10 pain in L posterior hip. Pt pleasant and cooperative throughout session.      Social/Functional History  Social/Functional History  Lives With: Spouse, Daughter ( and 2 daughters)  Type of Home: Apartment  Home Layout: One level (2nd fl apt)  Home Access: Stairs to enter with rails  Entrance Stairs - Number of Steps: 21  Entrance Stairs - Rails: Right  Bathroom Shower/Tub: Tub/Shower unit  Bathroom Toilet: Standard  Home Equipment: [employer], rolling, Rollator (pt reports utilizing cane for community distances only)  ADL Assistance: Independent  Homemaking Assistance: Independent  Homemaking Responsibilities: Yes (pt reports being independent with cleaning and laundry however  completes cooking)  Ambulation Assistance: Independent  Transfer Assistance: Independent  Active : No (pt 1059)  Mobility Inpatient CMS G-Code Modifier : CK (10/01/22 1059)     Tinneti Score     Goals  Short Term Goals  Time Frame for Short Term Goals: 10 visits  Short Term Goal 1: transfers with SBA  Short Term Goal 2: amb 300 ft with a RW x SBA  Short Term Goal 3: ascend/descend 4 steps with SBA  Short Term Goal 4: 20 min strengthening exercise program x SBA  Additional Goals?: No     Education  Patient Education  Education Given To: Patient  Education Provided: Role of Therapy;Plan of Care  Education Method: Verbal  Barriers to Learning: None  Education Outcome: Verbalized understanding    Therapy Time   Individual Concurrent Group Co-treatment   Time In 1330         Time Out 1355         Minutes 25             1 of [facility], PT

## 2022-10-07 LAB
GLUCOSE BLD-MCNC: 137 MG/DL (ref 65–105)
GLUCOSE BLD-MCNC: 138 MG/DL (ref 65–105)
GLUCOSE BLD-MCNC: 143 MG/DL (ref 65–105)
GLUCOSE BLD-MCNC: 148 MG/DL (ref 65–105)

## 2022-10-07 PROCEDURE — 1200000000 HC SEMI PRIVATE

## 2022-10-07 PROCEDURE — 6370000000 HC RX 637 (ALT 250 FOR IP): Performed by: REGISTERED NURSE

## 2022-10-07 PROCEDURE — 6370000000 HC RX 637 (ALT 250 FOR IP): Performed by: EMERGENCY MEDICINE

## 2022-10-07 PROCEDURE — 6360000002 HC RX W HCPCS: Performed by: EMERGENCY MEDICINE

## 2022-10-07 PROCEDURE — 97110 THERAPEUTIC EXERCISES: CPT

## 2022-10-07 PROCEDURE — 6370000000 HC RX 637 (ALT 250 FOR IP)

## 2022-10-07 PROCEDURE — 82947 ASSAY GLUCOSE BLOOD QUANT: CPT

## 2022-10-07 PROCEDURE — 2580000003 HC RX 258: Performed by: REGISTERED NURSE

## 2022-10-07 PROCEDURE — 97116 GAIT TRAINING THERAPY: CPT

## 2022-10-07 RX ORDER — LATANOPROST 50 UG/ML
1 SOLUTION/ DROPS OPHTHALMIC NIGHTLY
Status: DISCONTINUED | OUTPATIENT
Start: 2022-10-07 | End: 2022-10-09 | Stop reason: HOSPADM

## 2022-10-07 RX ORDER — MORPHINE SULFATE 4 MG/ML
4 INJECTION, SOLUTION INTRAMUSCULAR; INTRAVENOUS EVERY 4 HOURS PRN
Status: DISCONTINUED | OUTPATIENT
Start: 2022-10-07 | End: 2022-10-08

## 2022-10-07 RX ORDER — DIAZEPAM 2 MG/1
2 TABLET ORAL EVERY 6 HOURS PRN
Status: DISCONTINUED | OUTPATIENT
Start: 2022-10-07 | End: 2022-10-09 | Stop reason: HOSPADM

## 2022-10-07 RX ADMIN — BUSPIRONE HYDROCHLORIDE 10 MG: 10 TABLET ORAL at 22:01

## 2022-10-07 RX ADMIN — ACETAMINOPHEN 1000 MG: 500 TABLET ORAL at 14:26

## 2022-10-07 RX ADMIN — BUSPIRONE HYDROCHLORIDE 10 MG: 10 TABLET ORAL at 14:26

## 2022-10-07 RX ADMIN — GLIPIZIDE 10 MG: 10 TABLET ORAL at 16:35

## 2022-10-07 RX ADMIN — GABAPENTIN 300 MG: 300 CAPSULE ORAL at 22:01

## 2022-10-07 RX ADMIN — MORPHINE SULFATE 4 MG: 4 INJECTION INTRAVENOUS at 18:01

## 2022-10-07 RX ADMIN — LISINOPRIL 40 MG: 20 TABLET ORAL at 08:26

## 2022-10-07 RX ADMIN — DESMOPRESSIN ACETATE 40 MG: 0.2 TABLET ORAL at 22:01

## 2022-10-07 RX ADMIN — DIAZEPAM 2 MG: 2 TABLET ORAL at 13:05

## 2022-10-07 RX ADMIN — CELECOXIB 100 MG: 100 CAPSULE ORAL at 22:01

## 2022-10-07 RX ADMIN — OXYCODONE 10 MG: 5 TABLET ORAL at 07:19

## 2022-10-07 RX ADMIN — SODIUM CHLORIDE, PRESERVATIVE FREE 10 ML: 5 INJECTION INTRAVENOUS at 08:32

## 2022-10-07 RX ADMIN — FUROSEMIDE 10 MG: 20 TABLET ORAL at 08:26

## 2022-10-07 RX ADMIN — ACETAMINOPHEN 1000 MG: 500 TABLET ORAL at 22:01

## 2022-10-07 RX ADMIN — ACETAMINOPHEN 1000 MG: 500 TABLET ORAL at 07:19

## 2022-10-07 RX ADMIN — CYCLOBENZAPRINE 10 MG: 10 TABLET, FILM COATED ORAL at 22:10

## 2022-10-07 RX ADMIN — PANTOPRAZOLE SODIUM 40 MG: 40 TABLET, DELAYED RELEASE ORAL at 07:39

## 2022-10-07 RX ADMIN — GLIPIZIDE 10 MG: 10 TABLET ORAL at 07:39

## 2022-10-07 RX ADMIN — SERTRALINE 50 MG: 50 TABLET, FILM COATED ORAL at 08:26

## 2022-10-07 RX ADMIN — BUSPIRONE HYDROCHLORIDE 10 MG: 10 TABLET ORAL at 08:26

## 2022-10-07 RX ADMIN — GABAPENTIN 300 MG: 300 CAPSULE ORAL at 08:26

## 2022-10-07 RX ADMIN — OXYCODONE 10 MG: 5 TABLET ORAL at 16:35

## 2022-10-07 RX ADMIN — ROPINIROLE HYDROCHLORIDE 2 MG: 1 TABLET, FILM COATED ORAL at 22:01

## 2022-10-07 RX ADMIN — PANTOPRAZOLE SODIUM 40 MG: 40 TABLET, DELAYED RELEASE ORAL at 16:35

## 2022-10-07 RX ADMIN — OXYCODONE 10 MG: 5 TABLET ORAL at 11:14

## 2022-10-07 RX ADMIN — CELECOXIB 100 MG: 100 CAPSULE ORAL at 08:26

## 2022-10-07 RX ADMIN — CYCLOBENZAPRINE 10 MG: 10 TABLET, FILM COATED ORAL at 08:30

## 2022-10-07 RX ADMIN — MAGNESIUM GLUCONATE 500 MG ORAL TABLET 400 MG: 500 TABLET ORAL at 08:26

## 2022-10-07 RX ADMIN — GABAPENTIN 300 MG: 300 CAPSULE ORAL at 14:26

## 2022-10-07 RX ADMIN — SODIUM CHLORIDE, PRESERVATIVE FREE 10 ML: 5 INJECTION INTRAVENOUS at 22:01

## 2022-10-07 ASSESSMENT — PAIN SCALES - GENERAL
PAINLEVEL_OUTOF10: 7
PAINLEVEL_OUTOF10: 9
PAINLEVEL_OUTOF10: 7
PAINLEVEL_OUTOF10: 9

## 2022-10-07 NOTE — PROGRESS NOTES
Neurosurgery CALEB/Resident    Daily Progress Note   Chief Complaint   Patient presents with    Back Pain     \"Sciatica back pain\"      10/7/2022  11:57 AM    Chart reviewed. No acute events overnight. Left leg and buttock pain improved after injection yesterday. Complaining of pain right low back and buttock today. Was able to get out of bed to chair yesterday and ambulated 150 ft with PT. Vitals:    10/06/22 2119 10/07/22 0745 10/07/22 0855 10/07/22 1102   BP:  132/68  123/69   Pulse:  73  65   Resp: 16 16  16   Temp:  98.4 °F (36.9 °C)  97.8 °F (36.6 °C)   TempSrc:  Temporal  Temporal   SpO2:  98%  98%   Weight:       Height:   5' 3\" (1.6 m)        PE:   AOx3   Motor   L deltoid 5/5; R deltoid 5/5  L biceps 5/5; R biceps 5/5  L triceps 5/5; R triceps 5/5  L intrinsics 5/5; R intrinsics 5/5      L iliopsoas 5/5 , R iliopsoas 5/5  L quadriceps 5/5; R quadriceps 5/5  L Dorsiflexion 5/5; R dorsiflexion 5/5  L Plantarflexion 5/5; R plantarflexion 5/5  L EHL 4+/5; R EHL 5/5      Lab Results   Component Value Date    WBC 14.6 (H) 10/04/2022    HGB 11.9 10/04/2022    HCT 38.0 10/04/2022     10/04/2022    CHOL 154 06/24/2021    TRIG 217 (H) 06/24/2021    HDL 59 06/24/2021    LDLDIRECT 156 (H) 05/20/2017    ALT 58 (H) 10/04/2022    AST 35 (H) 10/04/2022     10/04/2022    K 4.8 10/04/2022     10/04/2022    CREATININE 1.24 (H) 10/04/2022    BUN 41 (H) 10/04/2022    CO2 23 10/04/2022    TSH 1.37 07/06/2022    INR 0.9 10/04/2022    GLUF 262 (H) 09/10/2018    LABA1C 6.9 08/25/2022    LABMICR Can not be calculated 09/22/2022    .6 (H) 06/06/2022    SEDRATE 14 08/05/2020       A/P  65 y.o. female who presents with  left S1 radiculopathy  POD 4 s/p left SI joint injection   POD 1 s/p S1 nerve root injection     - PT and OT   - pain control   - Neuro checks per floor protocol      Please contact neurosurgery with any changes in patients neurologic status.        Adam Wharton CNP  10/7/22  11:57 AM

## 2022-10-07 NOTE — PLAN OF CARE
Problem: Chronic Conditions and Co-morbidities  Goal: Patient's chronic conditions and co-morbidity symptoms are monitored and maintained or improved  10/7/2022 1620 by Godfrey Piña RN  Outcome: Progressing     Problem: Pain  Goal: Verbalizes/displays adequate comfort level or baseline comfort level  10/7/2022 1620 by Godfrey Piña RN  Outcome: Progressing     Problem: Safety - Adult  Goal: Free from fall injury  10/7/2022 1620 by Godfrey Piña RN  Outcome: Progressing

## 2022-10-07 NOTE — PROGRESS NOTES
Physical Therapy  Facility/Department: [address] ORTHO/MED SURG  Physical Therapy Daily Treatment Note    Name: Marco Antonio Borja  : 1960  MRN: [id number]  Date of Service: 10/7/2022    Discharge Recommendations:  Patient would benefit from continued therapy after discharge   PT Equipment Recommendations  Equipment Needed: No      Patient Diagnosis(es): The encounter diagnosis was Sciatica of left side. Past Medical History:  has a past medical history of Cervical spondylosis, Generalized anxiety disorder, Hyperlipidemia, Hypertension, Lumbar radiculopathy, DIEGO (nonalcoholic steatohepatitis), Obesity, Osteoarthritis of left knee, [facility] Restless legs syndrome, TIA (transient ischemic attack), and Type II or unspecified type diabetes mellitus without mention of complication, not stated as uncontrolled. Past Surgical History:  has a past surgical history that includes Upper gastrointestinal endoscopy (2012); Colonoscopy (2012);  section; Hysterectomy, total abdominal (); shoulder surgery (Left, 2021); Back Injection (Left, 10/03/2022); Spine surgery (Left, 10/03/2022); Injection (Left, 10/06/2022); and Spine surgery (Left, 10/6/2022). Assessment   Body Structures, Functions, Activity Limitations Requiring Skilled Therapeutic Intervention: Decreased functional mobility ; Decreased strength;Decreased endurance; Increased pain  Assessment: Pt ambulated 150ft with RW and SBA, slow and steady with no noted LOB. Pt limited by increased pain in R hip > L hip. Recommending continued PT to address deficits and maximize safety and independence with mobility. Therapy Prognosis: Good  Requires PT Follow-Up: Yes  Activity Tolerance  Activity Tolerance: Patient limited by pain; Patient tolerated treatment well     Plan   Physcial Therapy Plan  General Plan:  (5-6x/week)  Current Treatment Recommendations: Strengthening, Functional mobility training, Transfer training, Stair training, Gait training, Safety education & training, Balance training, Therapeutic activities, Equipment evaluation, education, & procurement, Home exercise program, Patient/Caregiver education & training, Endurance training  Safety Devices  Type of Devices: Nurse notified, Call light within reach, Gait belt, Left in chair, Patient at risk for falls  Restraints  Restraints Initially in Place: No     Restrictions  Restrictions/Precautions  Restrictions/Precautions: Up as Tolerated, Fall Risk  Required Braces or Orthoses?: No  Position Activity Restriction  Other position/activity restrictions: L SI nerve root injection 10/6. L SI joint injection 10/03/2022. Pt reports hx of cubital tunnel with plans for release this month     Subjective   General  Chart Reviewed: Yes  Patient assessed for rehabilitation services?: Yes  Response To Previous Treatment: Patient with no complaints from previous session. Family / Caregiver Present: Yes (daughter)  Follows Commands: Within Functional Limits  General Comment  Comments: Pt retired to chair at end of session with call light in reach. Subjective  Subjective: Pt and RN agreeable to PT this morning. Pt supine in bed upon arrival with c/o 9/10 pain in R posterior hip and 6/10 pain in L posterior hip. Pt pleasant and cooperative throughout session. Cognition   Orientation  Overall Orientation Status: Within Functional Limits  Cognition  Overall Cognitive Status: WFL     Objective   Bed mobility  Supine to Sit: Stand by assistance  Sit to Supine: Unable to assess (Pt retired to chair at end of session.)  Scooting: Stand by assistance  Bed Mobility Comments: HOB elevated, utilized bed rails. Transfers  Sit to Stand: Stand by assistance  Stand to Sit: Stand by assistance  Comment: RW used for transfers, good hand placement throughout. Performed x1 from bed.   Ambulation  Surface: Level tile  Device: Rolling Walker  Assistance: Stand by assistance  Gait Deviations: Slow Kaelyn;Decreased step length;Decreased step height  Distance: 150ft  Comments: Pt ambulated slow and steady with no noted LOB. Pt with reports of new onset R posterior hip pain following injections to L posterior hip (SI joint)  More Ambulation?: No  Stairs/Curb  Stairs?: No     Balance  Posture: Good  Sitting - Static: Good  Sitting - Dynamic: Fair;+  Standing - Static: Good  Standing - Dynamic: Fair;+  Comments: RW used to assess standing balance. Exercise Treatment:  BLE exercises: ankle pumps, heel slides, hip abduction. Reps: 2 sets of 10  Comments: Pt performed while seated in chair with BLE reclined.       AM-PAC Score  AM-PAC Inpatient Mobility Raw Score : 21 (10/07/22 1305)  AM-PAC Inpatient T-Scale Score : 50.25 (10/07/22 1305)  Mobility Inpatient CMS 0-100% Score: 28.97 (10/07/22 1305)  Mobility Inpatient CMS G-Code Modifier : CJ (10/07/22 1305)      Goals  Short Term Goals  Time Frame for Short Term Goals: 10 visits  Short Term Goal 1: transfers with SBA  Short Term Goal 2: amb 300 ft with a RW x SBA  Short Term Goal 3: ascend/descend 4 steps with SBA  Short Term Goal 4: 20 min strengthening exercise program x SBA  Additional Goals?: No       Education  Patient Education  Education Given To: Patient  Education Provided: Role of Therapy;Plan of Care;Home Exercise Program;Transfer Training  Education Method: Verbal  Barriers to Learning: None  Education Outcome: Verbalized understanding      Therapy Time   Individual Concurrent Group Co-treatment   Time In 0878         Time Out 1114         Minutes 27         Timed Code Treatment Minutes: 1778 Jorge Navarro PTA

## 2022-10-07 NOTE — PROGRESS NOTES
OBS/CDU   Attending NOTE      Patients PCP is:  Arya Hawley MD        SUBJECTIVE      Patient with complaints of lower back pain sciatica. Status post MRI showing foraminal narrowing L5-S1 lateral recess narrowing that is worse on right compared to left. Patient had sacroiliac joint injection under fluoroscopy. Patient was given options of treatment. Patient was felt to need reassessment. Patient followed by neurosurgery with possible discectomy depending on clinical success of procedure. Patient with some discomfort. Appreciate neurosurgical invention. For further plan per clinical course today. Palpable muscle spasms    PHYSICAL EXAM      General: NAD, AO X 3  Heent: EMOI, PERRL  Neck: SUPPLE, NO JVD  Cardiovascular: RRR, S1S2  Pulmonary: CTAB, NO SOB  Abdomen: SOFT, NTTP, ND, +BS  Extremities: +2/4 PULSES DISTAL, NO SWELLING  Neuro / Psych: NO NUMBNESS OR TINGLING, MENTATION AT BASELINE    PERTINENT TEST /EXAMS      I have reviewed all available laboratory results.     MEDICATIONS CURRENT   glucose chewable tablet 16 g, PRN  dextrose bolus 10% 125 mL, PRN   Or  dextrose bolus 10% 250 mL, PRN  glucagon (rDNA) injection 1 mg, PRN  dextrose 10 % infusion, Continuous PRN  cefTRIAXone (ROCEPHIN) 1,000 mg in sterile water 10 mL IV syringe, Once  cyclobenzaprine (FLEXERIL) tablet 10 mg, TID PRN  Empagliflozin-metFORMIN HCl 5-1000 MG TABS 1 tablet (Patient Supplied), BID  oxyCODONE (ROXICODONE) immediate release tablet 10 mg, Q4H PRN  lidocaine 4 % external patch 1 patch, Daily  atorvastatin (LIPITOR) tablet 40 mg, Nightly  busPIRone (BUSPAR) tablet 10 mg, TID  celecoxib (CELEBREX) capsule 100 mg, BID  furosemide (LASIX) tablet 10 mg, Every Other Day  gabapentin (NEURONTIN) capsule 300 mg, TID  glipiZIDE (GLUCOTROL) tablet 10 mg, BID AC  lisinopril (PRINIVIL;ZESTRIL) tablet 40 mg, Daily  magnesium oxide (MAG-OX) tablet 400 mg, Daily  pantoprazole (PROTONIX) tablet 40 mg, BID AC  rOPINIRole (REQUIP) tablet 2 mg, Nightly  sertraline (ZOLOFT) tablet 50 mg, Daily  sodium chloride flush 0.9 % injection 5-40 mL, 2 times per day  sodium chloride flush 0.9 % injection 5-40 mL, PRN  0.9 % sodium chloride infusion, PRN  ondansetron (ZOFRAN-ODT) disintegrating tablet 4 mg, Q8H PRN   Or  ondansetron (ZOFRAN) injection 4 mg, Q6H PRN  polyethylene glycol (GLYCOLAX) packet 17 g, Daily PRN  acetaminophen (TYLENOL) tablet 1,000 mg, 3 times per day        All medication charted and reviewed. CONSULTS      IP CONSULT TO NEUROSURGERY    ASSESSMENT/PLAN       Iliana Blackwell is a 65 y.o. female who presents with S1 radiculopathy    S1 radiculopathy. Followed by neurosurgery. Postop day 1 S1 nerve root injection. For reassessment by surgical team today. Patient did receive some relief yesterday post procedure but needs reassessment. Continue home medications and pain control  Monitor vitals, labs, and imaging  DISPO: pending consults and clinical improvement    --  Subhash Alegre MD  Emergency Medicine Attending Physician     This dictation was generated by voice recognition computer software. Although all attempts are made to edit the dictation for accuracy, there may be errors in the transcription that are not intended.

## 2022-10-08 LAB
GLUCOSE BLD-MCNC: 125 MG/DL (ref 65–105)
GLUCOSE BLD-MCNC: 135 MG/DL (ref 65–105)
GLUCOSE BLD-MCNC: 82 MG/DL (ref 65–105)
GLUCOSE BLD-MCNC: 91 MG/DL (ref 65–105)

## 2022-10-08 PROCEDURE — 2580000003 HC RX 258: Performed by: REGISTERED NURSE

## 2022-10-08 PROCEDURE — 6370000000 HC RX 637 (ALT 250 FOR IP): Performed by: REGISTERED NURSE

## 2022-10-08 PROCEDURE — 6370000000 HC RX 637 (ALT 250 FOR IP)

## 2022-10-08 PROCEDURE — 1200000000 HC SEMI PRIVATE

## 2022-10-08 PROCEDURE — 6360000002 HC RX W HCPCS: Performed by: EMERGENCY MEDICINE

## 2022-10-08 PROCEDURE — 6370000000 HC RX 637 (ALT 250 FOR IP): Performed by: EMERGENCY MEDICINE

## 2022-10-08 PROCEDURE — 82947 ASSAY GLUCOSE BLOOD QUANT: CPT

## 2022-10-08 RX ORDER — MORPHINE SULFATE 2 MG/ML
2 INJECTION, SOLUTION INTRAMUSCULAR; INTRAVENOUS EVERY 4 HOURS PRN
Status: DISCONTINUED | OUTPATIENT
Start: 2022-10-08 | End: 2022-10-09 | Stop reason: HOSPADM

## 2022-10-08 RX ADMIN — SODIUM CHLORIDE, PRESERVATIVE FREE 10 ML: 5 INJECTION INTRAVENOUS at 20:21

## 2022-10-08 RX ADMIN — CYCLOBENZAPRINE 10 MG: 10 TABLET, FILM COATED ORAL at 06:43

## 2022-10-08 RX ADMIN — OXYCODONE 10 MG: 5 TABLET ORAL at 00:38

## 2022-10-08 RX ADMIN — GABAPENTIN 300 MG: 300 CAPSULE ORAL at 08:19

## 2022-10-08 RX ADMIN — SODIUM CHLORIDE, PRESERVATIVE FREE 10 ML: 5 INJECTION INTRAVENOUS at 08:19

## 2022-10-08 RX ADMIN — LATANOPROST 1 DROP: 50 SOLUTION OPHTHALMIC at 20:22

## 2022-10-08 RX ADMIN — OXYCODONE 10 MG: 5 TABLET ORAL at 08:19

## 2022-10-08 RX ADMIN — GLIPIZIDE 10 MG: 10 TABLET ORAL at 15:57

## 2022-10-08 RX ADMIN — OXYCODONE 10 MG: 5 TABLET ORAL at 04:39

## 2022-10-08 RX ADMIN — BUSPIRONE HYDROCHLORIDE 10 MG: 10 TABLET ORAL at 08:18

## 2022-10-08 RX ADMIN — ACETAMINOPHEN 1000 MG: 500 TABLET ORAL at 13:51

## 2022-10-08 RX ADMIN — OXYCODONE 10 MG: 5 TABLET ORAL at 12:46

## 2022-10-08 RX ADMIN — OXYCODONE 10 MG: 5 TABLET ORAL at 18:24

## 2022-10-08 RX ADMIN — BUSPIRONE HYDROCHLORIDE 10 MG: 10 TABLET ORAL at 20:22

## 2022-10-08 RX ADMIN — ROPINIROLE HYDROCHLORIDE 2 MG: 1 TABLET, FILM COATED ORAL at 20:21

## 2022-10-08 RX ADMIN — BUSPIRONE HYDROCHLORIDE 10 MG: 10 TABLET ORAL at 13:51

## 2022-10-08 RX ADMIN — ACETAMINOPHEN 1000 MG: 500 TABLET ORAL at 20:22

## 2022-10-08 RX ADMIN — LISINOPRIL 40 MG: 20 TABLET ORAL at 08:19

## 2022-10-08 RX ADMIN — GLIPIZIDE 10 MG: 10 TABLET ORAL at 08:00

## 2022-10-08 RX ADMIN — SODIUM CHLORIDE, PRESERVATIVE FREE 10 ML: 5 INJECTION INTRAVENOUS at 10:44

## 2022-10-08 RX ADMIN — CELECOXIB 100 MG: 100 CAPSULE ORAL at 20:22

## 2022-10-08 RX ADMIN — MORPHINE SULFATE 2 MG: 2 INJECTION, SOLUTION INTRAMUSCULAR; INTRAVENOUS at 10:44

## 2022-10-08 RX ADMIN — PANTOPRAZOLE SODIUM 40 MG: 40 TABLET, DELAYED RELEASE ORAL at 15:57

## 2022-10-08 RX ADMIN — GABAPENTIN 300 MG: 300 CAPSULE ORAL at 13:51

## 2022-10-08 RX ADMIN — DESMOPRESSIN ACETATE 40 MG: 0.2 TABLET ORAL at 20:22

## 2022-10-08 RX ADMIN — CYCLOBENZAPRINE 10 MG: 10 TABLET, FILM COATED ORAL at 13:51

## 2022-10-08 RX ADMIN — ACETAMINOPHEN 1000 MG: 500 TABLET ORAL at 06:43

## 2022-10-08 RX ADMIN — DIAZEPAM 2 MG: 2 TABLET ORAL at 15:14

## 2022-10-08 RX ADMIN — PANTOPRAZOLE SODIUM 40 MG: 40 TABLET, DELAYED RELEASE ORAL at 08:00

## 2022-10-08 RX ADMIN — GABAPENTIN 300 MG: 300 CAPSULE ORAL at 20:22

## 2022-10-08 RX ADMIN — MAGNESIUM GLUCONATE 500 MG ORAL TABLET 400 MG: 500 TABLET ORAL at 08:19

## 2022-10-08 RX ADMIN — CELECOXIB 100 MG: 100 CAPSULE ORAL at 08:18

## 2022-10-08 RX ADMIN — SERTRALINE 50 MG: 50 TABLET, FILM COATED ORAL at 08:19

## 2022-10-08 ASSESSMENT — PAIN SCALES - GENERAL
PAINLEVEL_OUTOF10: 8
PAINLEVEL_OUTOF10: 7
PAINLEVEL_OUTOF10: 8

## 2022-10-08 ASSESSMENT — PAIN DESCRIPTION - LOCATION: LOCATION: BACK

## 2022-10-08 NOTE — PROGRESS NOTES
[facility]  CDU / OBSERVATION ENCOUNTER  ATTENDING NOTE       I performed a history and physical examination of the patient and discussed management with the resident or midlevel provider. I reviewed the resident or midlevel provider's note and agree with the documented findings and plan of care. Any areas of disagreement are noted on the chart. I was personally present for the key portions of any procedures. I have documented in the chart those procedures where I was not present during the key portions. I have reviewed the nurses notes. I agree with the chief complaint, past medical history, past surgical history, allergies, medications, social and family history as documented unless otherwise noted below. The Family history, social history, and ROS are effectively unchanged since admission unless noted elsewhere in the chart. Patient is some improvement. Patient with ongoing discomfort however and discussed with neurosurgery. Patient to be reevaluated again by Dr. Shania Jovel. Long discussion with patient regarding narcotic analgesics. We will have patient treated but will use low doses as patient has had difficulty with analgesia previously. Patient currently improved from yesterday in appearance. Patient states ongoing discomfort is significant. Will follow neurosurgical recommendations after evaluation this afternoon.     Herminio Tello MD  Attending Emergency  Physician

## 2022-10-08 NOTE — PROGRESS NOTES
Neurosurgery CALEB/Resident    Daily Progress Note   Chief Complaint   Patient presents with    Back Pain     \"Sciatica back pain\"      10/8/2022  4:01 PM    Chart reviewed. No acute events overnight. No new complaints. Continues to have back and bilat buttock pain. Ambulated 150 feet yesterday with PT. Vitals:    10/08/22 0849 10/08/22 1114 10/08/22 1120 10/08/22 1316   BP:   101/66    Pulse:   64    Resp: 18 18 18 18   Temp:   98.2 °F (36.8 °C)    TempSrc:   Oral    SpO2:   94%    Weight:       Height:             PE: AOx3   Motor   L deltoid 5/5; R deltoid 5/5  L biceps 5/5; R biceps 5/5  L triceps 5/5; R triceps 5/5  L intrinsics 5/5; R intrinsics 5/5      L iliopsoas 5/5 , R iliopsoas 5/5  L quadriceps 5/5; R quadriceps 5/5  L Dorsiflexion 5/5; R dorsiflexion 5/5  L Plantarflexion 5/5; R plantarflexion 5/5  L EHL 5/5; R EHL 5/5    Sensation intact        Lab Results   Component Value Date    WBC 14.6 (H) 10/04/2022    HGB 11.9 10/04/2022    HCT 38.0 10/04/2022     10/04/2022    CHOL 154 06/24/2021    TRIG 217 (H) 06/24/2021    HDL 59 06/24/2021    LDLDIRECT 156 (H) 05/20/2017    ALT 58 (H) 10/04/2022    AST 35 (H) 10/04/2022     10/04/2022    K 4.8 10/04/2022     10/04/2022    CREATININE 1.24 (H) 10/04/2022    BUN 41 (H) 10/04/2022    CO2 23 10/04/2022    TSH 1.37 07/06/2022    INR 0.9 10/04/2022    GLUF 262 (H) 09/10/2018    LABA1C 6.9 08/25/2022    LABMICR Can not be calculated 09/22/2022    .6 (H) 06/06/2022    SEDRATE 14 08/05/2020       Radiology   MRI LUMBAR SPINE WO CONTRAST    Result Date: 10/2/2022  EXAMINATION: MRI OF THE LUMBAR SPINE WITHOUT CONTRAST, 10/2/2022 10:15 am TECHNIQUE: Multiplanar multisequence MRI of the lumbar spine was performed without the administration of intravenous contrast. COMPARISON: Spine MRI performed 06/02/2022. HISTORY: ORDERING SYSTEM PROVIDED HISTORY: History of L5-S1 radiculopathy.   Complain of shooting pain down TECHNOLOGIST PROVIDED HISTORY: History of L5-S1 radiculopathy. Complain of shooting pain down Reason for Exam: History of L5-S1 radiculopathy. Complain of shooting pain down FINDINGS: BONES/ALIGNMENT: Vertebral body heights are maintained. There is age-appropriate bone marrow signal.  There is degenerative disc disease with loss of disc signal.  There is disc space narrowing at L5-S1. There is no significant spondylolisthesis. SPINAL CORD: The conus is normal in caliber and signal and terminates at L1. The cauda equina is unremarkable. SOFT TISSUES: The posterior paraspinal soft tissues are unremarkable. The visualized abdominal structures are unremarkable. L1-L2: There is a tiny circumferential disc bulge. There is no canal stenosis or foraminal narrowing. L2-L3: There is no significant disc herniation, spinal canal stenosis or neural foraminal narrowing. L3-L4: There is a small circumferential disc bulge with facet and ligamentous hypertrophy. There is no canal stenosis or foraminal narrowing. L4-L5: There is a circumferential disc bulge with facet hypertrophy. There is no canal stenosis or foraminal narrowing. L5-S1: There is a circumferential disc bulge with midline and right paracentral disc protrusion. There is no canal stenosis or left foraminal narrowing. There is mild right foraminal narrowing. There is narrowing of the lateral recesses that is worse on the right compared to the left. Degenerative change most pronounced in the lower lumbar spine with mild right foraminal narrowing at L5-S1 and lateral recess narrowing that is worse on the right compared to the left. A/P  65 y.o. female who presents with  left S1 radiculopathy  POD 5 s/p left SI joint injection   POD 2 s/p S1 nerve root injection     - PT and OT, continue at discharge  - pain control   - follow up with Dr Little Keith in 4-6 weeks or sooner if symptoms worsen      Please contact neurosurgery with any changes in patients neurologic status.        Eric Lea Jevon Perez, CNP  10/8/22  4:01 PM

## 2022-10-08 NOTE — PLAN OF CARE
Problem: Chronic Conditions and Co-morbidities  Goal: Patient's chronic conditions and co-morbidity symptoms are monitored and maintained or improved  10/8/2022 0349 by Morris Owens RN  Outcome: Progressing  10/7/2022 1620 by Jose David Tatum RN  Outcome: Progressing     Problem: Pain  Goal: Verbalizes/displays adequate comfort level or baseline comfort level  10/8/2022 0349 by Morris Owens RN  Outcome: Progressing  10/7/2022 1620 by Jose David Tatum RN  Outcome: Progressing     Problem: Safety - Adult  Goal: Free from fall injury  10/8/2022 0349 by Morris Owens RN  Outcome: Progressing  10/7/2022 1620 by Jose David Tatum RN  Outcome: Progressing     Problem: ABCDS Injury Assessment  Goal: Absence of physical injury  Outcome: Progressing     Problem: Discharge Planning  Goal: Discharge to home or other facility with appropriate resources  Outcome: Progressing

## 2022-10-08 NOTE — PROGRESS NOTES
OBS/CDU   RESIDENT NOTE      Patients PCP is:  Arnulfo Carballo MD        SUBJECTIVE      No acute events overnight. Patient tolerating diet. Voiding and having bowel movements. Patient able to work with physical therapy; 150 feet of ambulation achieved. Patient continues to have pain following second neurosurgery procedure. Pain is now in her right lumbar and sciatic region. Patient states pain is only being controlled with morphine and Valium for muscle spasm. Encourage patient to space out medication so that she has become too sedated. Patient amenable to this plan. PHYSICAL EXAM      General: NAD, AO X 3  Heent: EMOI, PERRL  Neck: SUPPLE, NO JVD  Cardiovascular: RRR, S1S2  Pulmonary: CTAB, NO SOB  Abdomen: SOFT, NTTP, ND, +BS  Extremities: +2/4 PULSES DISTAL, NO SWELLING, tender to palpation just lateral to right side of SI joint, small knot of muscle felt  Neuro / Psych: NO NUMBNESS OR TINGLING, MENTATION AT BASELINE    PERTINENT TEST /EXAMS      I have reviewed all available laboratory results.     MEDICATIONS CURRENT   diazePAM (VALIUM) tablet 2 mg, Q6H PRN  morphine injection 4 mg, Q4H PRN  latanoprost (XALATAN) 0.005 % ophthalmic solution 1 drop, Nightly  glucose chewable tablet 16 g, PRN  dextrose bolus 10% 125 mL, PRN   Or  dextrose bolus 10% 250 mL, PRN  glucagon (rDNA) injection 1 mg, PRN  dextrose 10 % infusion, Continuous PRN  cefTRIAXone (ROCEPHIN) 1,000 mg in sterile water 10 mL IV syringe, Once  cyclobenzaprine (FLEXERIL) tablet 10 mg, TID PRN  Empagliflozin-metFORMIN HCl 5-1000 MG TABS 1 tablet (Patient Supplied), BID  oxyCODONE (ROXICODONE) immediate release tablet 10 mg, Q4H PRN  lidocaine 4 % external patch 1 patch, Daily  atorvastatin (LIPITOR) tablet 40 mg, Nightly  busPIRone (BUSPAR) tablet 10 mg, TID  celecoxib (CELEBREX) capsule 100 mg, BID  furosemide (LASIX) tablet 10 mg, Every Other Day  gabapentin (NEURONTIN) capsule 300 mg, TID  glipiZIDE (GLUCOTROL) tablet 10 mg, BID AC  lisinopril (PRINIVIL;ZESTRIL) tablet 40 mg, Daily  magnesium oxide (MAG-OX) tablet 400 mg, Daily  pantoprazole (PROTONIX) tablet 40 mg, BID AC  rOPINIRole (REQUIP) tablet 2 mg, Nightly  sertraline (ZOLOFT) tablet 50 mg, Daily  sodium chloride flush 0.9 % injection 5-40 mL, 2 times per day  sodium chloride flush 0.9 % injection 5-40 mL, PRN  0.9 % sodium chloride infusion, PRN  ondansetron (ZOFRAN-ODT) disintegrating tablet 4 mg, Q8H PRN   Or  ondansetron (ZOFRAN) injection 4 mg, Q6H PRN  polyethylene glycol (GLYCOLAX) packet 17 g, Daily PRN  acetaminophen (TYLENOL) tablet 1,000 mg, 3 times per day      All medication charted and reviewed. CONSULTS      IP CONSULT TO NEUROSURGERY    ASSESSMENT/PLAN       Jenn Andrew is a 65 y.o. female who presents with   left lower back pain/sciatica pain. Neurosurgery consulted:  MRI lumbar spine without contrast:  Degenerative change most pronounced in the lower lumbar spine with mild right foraminal narrowing at L5-S1 and lateral recess narrowing that is worse on the right compared to the left. Status post sacroiliac joint injection under fluoroscopy. Per neurosurgery, if patient has not relief they will consider taking operative treatment for either L5-S1 disc rupture or perform S1 foraminal injection. Patient continues to have pain after the S1 nerve root block. Postop day 2. Pending neurosurgery evaluation the patient 10/8/2022  Physical therapy consulted: They recommend 5 to 6 weeks of outpatient therapy for strengthening and functional mobility. Continue home medications and pain control  Monitor vitals, labs, and imaging  DISPO: pending consults and clinical improvement    --  Laura Wyman DO  Emergency Medicine Resident Physician     This dictation was generated by voice recognition computer software. Although all attempts are made to edit the dictation for accuracy, there may be errors in the transcription that are not intended.

## 2022-10-09 VITALS
BODY MASS INDEX: 33.66 KG/M2 | DIASTOLIC BLOOD PRESSURE: 81 MMHG | HEIGHT: 63 IN | OXYGEN SATURATION: 93 % | TEMPERATURE: 97.5 F | SYSTOLIC BLOOD PRESSURE: 146 MMHG | WEIGHT: 190 LBS | RESPIRATION RATE: 18 BRPM | HEART RATE: 74 BPM

## 2022-10-09 LAB
GLUCOSE BLD-MCNC: 100 MG/DL (ref 65–105)
GLUCOSE BLD-MCNC: 153 MG/DL (ref 65–105)
GLUCOSE BLD-MCNC: 158 MG/DL (ref 65–105)

## 2022-10-09 PROCEDURE — 97116 GAIT TRAINING THERAPY: CPT

## 2022-10-09 PROCEDURE — 6370000000 HC RX 637 (ALT 250 FOR IP): Performed by: REGISTERED NURSE

## 2022-10-09 PROCEDURE — 6360000002 HC RX W HCPCS: Performed by: EMERGENCY MEDICINE

## 2022-10-09 PROCEDURE — 6370000000 HC RX 637 (ALT 250 FOR IP)

## 2022-10-09 PROCEDURE — 97110 THERAPEUTIC EXERCISES: CPT

## 2022-10-09 PROCEDURE — 2580000003 HC RX 258: Performed by: REGISTERED NURSE

## 2022-10-09 PROCEDURE — 82947 ASSAY GLUCOSE BLOOD QUANT: CPT

## 2022-10-09 PROCEDURE — 6370000000 HC RX 637 (ALT 250 FOR IP): Performed by: EMERGENCY MEDICINE

## 2022-10-09 RX ORDER — CYCLOBENZAPRINE HCL 10 MG
10 TABLET ORAL 3 TIMES DAILY PRN
Qty: 21 TABLET | Refills: 0 | Status: SHIPPED | OUTPATIENT
Start: 2022-10-09 | End: 2022-10-19

## 2022-10-09 RX ORDER — DIAZEPAM 2 MG/1
2 TABLET ORAL EVERY 8 HOURS PRN
Qty: 21 TABLET | Refills: 0 | Status: SHIPPED | OUTPATIENT
Start: 2022-10-09 | End: 2022-10-16

## 2022-10-09 RX ORDER — OXYCODONE HYDROCHLORIDE 5 MG/1
5 TABLET ORAL EVERY 6 HOURS PRN
Qty: 28 TABLET | Refills: 0 | Status: SHIPPED | OUTPATIENT
Start: 2022-10-09 | End: 2022-10-11 | Stop reason: ALTCHOICE

## 2022-10-09 RX ADMIN — GLIPIZIDE 10 MG: 10 TABLET ORAL at 08:19

## 2022-10-09 RX ADMIN — GABAPENTIN 300 MG: 300 CAPSULE ORAL at 14:26

## 2022-10-09 RX ADMIN — FUROSEMIDE 10 MG: 20 TABLET ORAL at 08:20

## 2022-10-09 RX ADMIN — BUSPIRONE HYDROCHLORIDE 10 MG: 10 TABLET ORAL at 14:26

## 2022-10-09 RX ADMIN — DIAZEPAM 2 MG: 2 TABLET ORAL at 12:56

## 2022-10-09 RX ADMIN — OXYCODONE 10 MG: 5 TABLET ORAL at 10:41

## 2022-10-09 RX ADMIN — PANTOPRAZOLE SODIUM 40 MG: 40 TABLET, DELAYED RELEASE ORAL at 08:19

## 2022-10-09 RX ADMIN — GLIPIZIDE 10 MG: 10 TABLET ORAL at 16:12

## 2022-10-09 RX ADMIN — SODIUM CHLORIDE, PRESERVATIVE FREE 10 ML: 5 INJECTION INTRAVENOUS at 08:21

## 2022-10-09 RX ADMIN — PANTOPRAZOLE SODIUM 40 MG: 40 TABLET, DELAYED RELEASE ORAL at 16:12

## 2022-10-09 RX ADMIN — OXYCODONE 10 MG: 5 TABLET ORAL at 14:26

## 2022-10-09 RX ADMIN — MORPHINE SULFATE 2 MG: 2 INJECTION, SOLUTION INTRAMUSCULAR; INTRAVENOUS at 17:45

## 2022-10-09 RX ADMIN — LISINOPRIL 40 MG: 20 TABLET ORAL at 08:20

## 2022-10-09 RX ADMIN — OXYCODONE 10 MG: 5 TABLET ORAL at 04:01

## 2022-10-09 RX ADMIN — CYCLOBENZAPRINE 10 MG: 10 TABLET, FILM COATED ORAL at 04:01

## 2022-10-09 RX ADMIN — ACETAMINOPHEN 1000 MG: 500 TABLET ORAL at 14:26

## 2022-10-09 RX ADMIN — GABAPENTIN 300 MG: 300 CAPSULE ORAL at 08:20

## 2022-10-09 RX ADMIN — DIAZEPAM 2 MG: 2 TABLET ORAL at 06:04

## 2022-10-09 RX ADMIN — MAGNESIUM GLUCONATE 500 MG ORAL TABLET 400 MG: 500 TABLET ORAL at 08:19

## 2022-10-09 RX ADMIN — BUSPIRONE HYDROCHLORIDE 10 MG: 10 TABLET ORAL at 08:20

## 2022-10-09 RX ADMIN — SERTRALINE 50 MG: 50 TABLET, FILM COATED ORAL at 08:19

## 2022-10-09 RX ADMIN — CYCLOBENZAPRINE 10 MG: 10 TABLET, FILM COATED ORAL at 16:12

## 2022-10-09 RX ADMIN — CELECOXIB 100 MG: 100 CAPSULE ORAL at 08:19

## 2022-10-09 RX ADMIN — ACETAMINOPHEN 1000 MG: 500 TABLET ORAL at 06:00

## 2022-10-09 ASSESSMENT — PAIN SCALES - GENERAL
PAINLEVEL_OUTOF10: 7
PAINLEVEL_OUTOF10: 9

## 2022-10-09 NOTE — DISCHARGE SUMMARY
CDU Discharge Summary        Patient:  Kedar Watkins  YOB: 1960    MRN: [id number]   Acct: [de-identified]    Primary Care Physician: Barbara Olivares MD    Admit date:  9/30/2022  3:23 PM  Discharge date: No discharge date for patient encounter. 10/9/2022    Discharge Diagnoses:     Acute lower back pain due to acute on chronic lumbar stenosis/nerve impingement  Improved with pain control, neurosurgery consult with subsequent surgery    Follow-up:  Call today/tomorrow for a follow up appointment with Barbara Olivares MD , or return to the Emergency Room with worsening symptoms    Stressed to patient the importance of following up with primary care doctor for further workup/management of symptoms. Pt verbalizes understanding and agrees with plan. Discharge Medications:  Changes to medications            Medication List        CONTINUE taking these medications      Elbow Brace Misc  1 each by Does not apply route at bedtime Please dispense left cubital tunnel brace     * Prodigy Autocode Blood Glucose w/Device Kit  1 Device by Does not apply route 3 times daily     * blood glucose monitor kit and supplies  Dispense sufficient amount for indicated testing frequency plus additional to accommodate PRN testing needs. Dispense all needed supplies to include: monitor, strips, lancing device, lancets, control solutions, alcohol swabs. Prodigy Brand     Prodigy Lancets 28G Misc  1 Bottle by Does not apply route three times daily           * This list has 2 medication(s) that are the same as other medications prescribed for you. Read the directions carefully, and ask your doctor or other care provider to review them with you.                 ASK your doctor about these medications      acetaminophen 650 MG extended release tablet  Commonly known as: Tylenol 8 Hour  Take 1 tablet by mouth every 8 hours as needed for Pain     albuterol sulfate  (90 Base) MCG/ACT inhaler  Commonly known as: Proventil HFA  Inhale 2 puffs into the lungs every 4 hours as needed for Wheezing     alendronate 70 MG tablet  Commonly known as: FOSAMAX  Take 1 tablet by mouth every 7 days     Aspirin Low Dose 81 MG EC tablet  Generic drug: aspirin  TAKE 1 TABLET BY MOUTH ONE TIME A DAY     atorvastatin 40 MG tablet  Commonly known as: LIPITOR  Take 1 tablet by mouth at bedtime     bimatoprost 0.01 % Soln ophthalmic drops  Commonly known as: LUMIGAN     busPIRone 10 MG tablet  Commonly known as: BUSPAR  TAKE 1 TABLET BY MOUTH 3 TIMES A DAY     celecoxib 100 MG capsule  Commonly known as: CELEBREX  TAKE 1 CAPSULE BY MOUTH 2 TIMES A DAY     diclofenac sodium 1 % Gel  Commonly known as: VOLTAREN  Apply 2 g topically in the morning and 2 g at noon and 2 g in the evening and 2 g before bedtime. furosemide 20 MG tablet  Commonly known as: Lasix  Take 0.5 tablets by mouth every other day     gabapentin 300 MG capsule  Commonly known as: NEURONTIN  Take 1 capsule by mouth 3 times daily for 30 days. glipiZIDE 10 MG tablet  Commonly known as: GLUCOTROL  Take 1 tab bid     lisinopril 40 MG tablet  Commonly known as: PRINIVIL;ZESTRIL  Take 1 tablet by mouth daily     Magnesium Oxide 400 MG Caps  Take 400 mg by mouth daily     omeprazole 20 MG delayed release capsule  Commonly known as: PriLOSEC  Take 1 capsule by mouth 2 times daily     oxyCODONE-acetaminophen 5-325 MG per tablet  Commonly known as: PERCOCET     Prodigy No Coding Blood Gluc strip  Generic drug: blood glucose test strips  Use to test once daily and as needed as directed by provider. Please dispense Prodigy brand.      rOPINIRole 1 MG tablet  Commonly known as: REQUIP  Take 2 tablets by mouth nightly     sertraline 50 MG tablet  Commonly known as: ZOLOFT  TAKE ONE TABLET BY MOUTH EVERY MORNING     Synjardy 5-1000 MG Tabs  Generic drug: Empagliflozin-metFORMIN HCl  TAKE 1 TABLET BY MOUTH 2 TIMES A DAY     Trulicity 1.5 HC/7.7IS Sopn  Generic drug: Dulaglutide  INJECT THE CONTENTS OF ONE SYRINGE UNDER THE SKIN ONCE WEEKLY     Zeasorb-AF 2 % powder  Generic drug: miconazole  Apply 43 g topically as needed for Itching Apply topically 2 times daily. Diet:  ADULT DIET; Regular; 4 carb choices (60 gm/meal) , Advance as tolerated     Activity:  As tolerated    Consultants: IP CONSULT TO NEUROSURGERY    Procedures:  Not indicated     Diagnostic Test:   Results for orders placed or performed during the hospital encounter of 09/30/22   COVID-19, Rapid    Specimen: Nasopharyngeal Swab   Result Value Ref Range    Specimen Description . NASOPHARYNGEAL SWAB     SARS-CoV-2, Rapid Not Detected Not Detected   Comprehensive Metabolic Panel w/ Reflex to MG   Result Value Ref Range    Glucose 104 (H) 70 - 99 mg/dL    BUN 41 (H) 8 - 23 mg/dL    Creatinine 1.24 (H) 0.50 - 0.90 mg/dL    Calcium 9.3 8.6 - 10.4 mg/dL    Sodium 139 135 - 144 mmol/L    Potassium 4.8 3.7 - 5.3 mmol/L    Chloride 102 98 - 107 mmol/L    CO2 23 20 - 31 mmol/L    Anion Gap 14 9 - 17 mmol/L    Alkaline Phosphatase 126 (H) 35 - 104 U/L    ALT 58 (H) 5 - 33 U/L    AST 35 (H) <32 U/L    Total Bilirubin 0.3 0.3 - 1.2 mg/dL    Total Protein 7.0 6.4 - 8.3 g/dL    Albumin 4.1 3.5 - 5.2 g/dL    Albumin/Globulin Ratio 1.4 1.0 - 2.5    Est, Glom Filt Rate 50 (L) >60 mL/min/1.73m2   CBC with Auto Differential   Result Value Ref Range    WBC 14.6 (H) 3.5 - 11.3 k/uL    RBC 4.30 3.95 - 5.11 m/uL    Hemoglobin 11.9 11.9 - 15.1 g/dL    Hematocrit 38.0 36.3 - 47.1 %    MCV 88.4 82.6 - 102.9 fL    MCH 27.7 25.2 - 33.5 pg    MCHC 31.3 28.4 - 34.8 g/dL    RDW 13.8 11.8 - 14.4 %    Platelets 100 817 - 622 k/uL    MPV 10.9 8.1 - 13.5 fL    NRBC Automated 0.0 0.0 per 100 WBC    Seg Neutrophils 78 (H) 36 - 65 %    Lymphocytes 12 (L) 24 - 43 %    Monocytes 8 3 - 12 %    Eosinophils % 1 1 - 4 %    Basophils 0 0 - 2 %    Immature Granulocytes 1 (H) 0 %    Segs Absolute 11.61 (H) 1.50 - 8.10 k/uL    Absolute Lymph # 1.69 1.10 - 3.70 k/uL    Absolute Mono # 1.18 0.10 - 1.20 k/uL    Absolute Eos # 0.07 0.00 - 0.44 k/uL    Basophils Absolute <0.03 0.00 - 0.20 k/uL    Absolute Immature Granulocyte 0.08 0.00 - 0.30 k/uL   APTT   Result Value Ref Range    PTT 20.7 20.5 - 30.5 sec   Protime-INR   Result Value Ref Range    Protime 9.4 9.1 - 12.3 sec    INR 0.9    Urinalysis with Microscopic   Result Value Ref Range    Color, UA Yellow Yellow    Turbidity UA Clear Clear    Glucose, Ur 3+ (A) NEGATIVE    Bilirubin Urine NEGATIVE NEGATIVE    Ketones, Urine NEGATIVE NEGATIVE    Specific Gravity, UA 1.025 1.005 - 1.030    Urine Hgb NEGATIVE NEGATIVE    pH, UA 5.0 5.0 - 8.0    Protein, UA NEGATIVE NEGATIVE    Urobilinogen, Urine Normal Normal    Nitrite, Urine NEGATIVE NEGATIVE    Leukocyte Esterase, Urine SMALL (A) NEGATIVE    WBC, UA 5 TO 10 0 - 5 /HPF    Epithelial Cells UA 5 TO 10 0 - 5 /HPF    Bacteria, UA FEW (A) None    Yeast, UA FEW (A) None   POC Glucose Fingerstick   Result Value Ref Range    POC Glucose 88 65 - 105 mg/dL   POC Glucose Fingerstick   Result Value Ref Range    POC Glucose 134 (H) 65 - 105 mg/dL   POC Glucose Fingerstick   Result Value Ref Range    POC Glucose 139 (H) 65 - 105 mg/dL   POC Glucose Fingerstick   Result Value Ref Range    POC Glucose 132 (H) 65 - 105 mg/dL   POC Glucose Fingerstick   Result Value Ref Range    POC Glucose 192 (H) 65 - 105 mg/dL   POC Glucose Fingerstick   Result Value Ref Range    POC Glucose 105 65 - 105 mg/dL   POC Glucose Fingerstick   Result Value Ref Range    POC Glucose 207 (H) 65 - 105 mg/dL   POC Glucose Fingerstick   Result Value Ref Range    POC Glucose 232 (H) 65 - 105 mg/dL   POC Glucose Fingerstick   Result Value Ref Range    POC Glucose 263 (H) 65 - 105 mg/dL   POC Glucose Fingerstick   Result Value Ref Range    POC Glucose 155 (H) 65 - 105 mg/dL   POC Glucose Fingerstick   Result Value Ref Range    POC Glucose 62 (L) 65 - 105 mg/dL   POC Glucose Fingerstick   Result Value Ref Range    POC Glucose 63 (L) 65 - 105 mg/dL   POC Glucose Fingerstick   Result Value Ref Range    POC Glucose 77 65 - 105 mg/dL   POC Glucose Fingerstick   Result Value Ref Range    POC Glucose 152 (H) 65 - 105 mg/dL   POC Glucose Fingerstick   Result Value Ref Range    POC Glucose 130 (H) 65 - 105 mg/dL   POC Glucose Fingerstick   Result Value Ref Range    POC Glucose 104 65 - 105 mg/dL   POC Glucose Fingerstick   Result Value Ref Range    POC Glucose 120 (H) 65 - 105 mg/dL   POC Glucose Fingerstick   Result Value Ref Range    POC Glucose 206 (H) 65 - 105 mg/dL   POC Glucose Fingerstick   Result Value Ref Range    POC Glucose 111 (H) 65 - 105 mg/dL   POC Glucose Fingerstick   Result Value Ref Range    POC Glucose 107 (H) 65 - 105 mg/dL   POC Glucose Fingerstick   Result Value Ref Range    POC Glucose 95 65 - 105 mg/dL   POC Glucose Fingerstick   Result Value Ref Range    POC Glucose 122 (H) 65 - 105 mg/dL   POC Glucose Fingerstick   Result Value Ref Range    POC Glucose 222 (H) 65 - 105 mg/dL   POC Glucose Fingerstick   Result Value Ref Range    POC Glucose 239 (H) 65 - 105 mg/dL   POC Glucose Fingerstick   Result Value Ref Range    POC Glucose 137 (H) 65 - 105 mg/dL   POC Glucose Fingerstick   Result Value Ref Range    POC Glucose 138 (H) 65 - 105 mg/dL   POC Glucose Fingerstick   Result Value Ref Range    POC Glucose 143 (H) 65 - 105 mg/dL   POC Glucose Fingerstick   Result Value Ref Range    POC Glucose 148 (H) 65 - 105 mg/dL   POC Glucose Fingerstick   Result Value Ref Range    POC Glucose 91 65 - 105 mg/dL   POC Glucose Fingerstick   Result Value Ref Range    POC Glucose 82 65 - 105 mg/dL   POC Glucose Fingerstick   Result Value Ref Range    POC Glucose 125 (H) 65 - 105 mg/dL   POC Glucose Fingerstick   Result Value Ref Range    POC Glucose 135 (H) 65 - 105 mg/dL   POC Glucose Fingerstick   Result Value Ref Range    POC Glucose 153 (H) 65 - 105 mg/dL   EKG 12 Lead   Result Value Ref Range    Ventricular Rate 69 BPM    Atrial Rate 69 BPM    P-R Interval 158 ms    QRS Duration 72 ms    Q-T Interval 410 ms    QTc Calculation (Bazett) 439 ms    P Axis 58 degrees    R Axis -16 degrees    T Axis -13 degrees   TYPE AND SCREEN   Result Value Ref Range    Expiration Date 10/07/2022,2359     Arm Band Number [id number]     ABO/Rh A POSITIVE     Antibody Screen NEGATIVE      XR CHEST (SINGLE VIEW FRONTAL)    Result Date: 10/4/2022  EXAMINATION: ONE XRAY VIEW OF THE CHEST 10/4/2022 2:53 pm COMPARISON: Chest June 5, 2022 HISTORY: ORDERING SYSTEM PROVIDED HISTORY: pre op TECHNOLOGIST PROVIDED HISTORY: pre op Reason for Exam: uprt port chest FINDINGS: Heart is normal in size. Lungs are clear. No free air. No acute process. MRI LUMBAR SPINE WO CONTRAST    Result Date: 10/2/2022  EXAMINATION: MRI OF THE LUMBAR SPINE WITHOUT CONTRAST, 10/2/2022 10:15 am TECHNIQUE: Multiplanar multisequence MRI of the lumbar spine was performed without the administration of intravenous contrast. COMPARISON: Spine MRI performed 06/02/2022. HISTORY: ORDERING SYSTEM PROVIDED HISTORY: History of L5-S1 radiculopathy. Complain of shooting pain down TECHNOLOGIST PROVIDED HISTORY: History of L5-S1 radiculopathy. Complain of shooting pain down Reason for Exam: History of L5-S1 radiculopathy. Complain of shooting pain down FINDINGS: BONES/ALIGNMENT: Vertebral body heights are maintained. There is age-appropriate bone marrow signal.  There is degenerative disc disease with loss of disc signal.  There is disc space narrowing at L5-S1. There is no significant spondylolisthesis. SPINAL CORD: The conus is normal in caliber and signal and terminates at L1. The cauda equina is unremarkable. SOFT TISSUES: The posterior paraspinal soft tissues are unremarkable. The visualized abdominal structures are unremarkable. L1-L2: There is a tiny circumferential disc bulge. There is no canal stenosis or foraminal narrowing.  L2-L3: There is no significant disc herniation, spinal canal stenosis or neural foraminal narrowing. L3-L4: There is a small circumferential disc bulge with facet and ligamentous hypertrophy. There is no canal stenosis or foraminal narrowing. L4-L5: There is a circumferential disc bulge with facet hypertrophy. There is no canal stenosis or foraminal narrowing. L5-S1: There is a circumferential disc bulge with midline and right paracentral disc protrusion. There is no canal stenosis or left foraminal narrowing. There is mild right foraminal narrowing. There is narrowing of the lateral recesses that is worse on the right compared to the left. Degenerative change most pronounced in the lower lumbar spine with mild right foraminal narrowing at L5-S1 and lateral recess narrowing that is worse on the right compared to the left. FLUORO FOR SURGICAL PROCEDURES    Result Date: 10/6/2022  Radiology exam is complete. No Radiologist dictation. Please follow up with ordering provider. FLUORO FOR SURGICAL PROCEDURES    Result Date: 10/3/2022  Radiology exam is complete. No Radiologist dictation. Please follow up with ordering provider. Physical Exam:    General appearance - NAD, AOx 3   Lungs -CTAB, no R/R/R  Heart - RRR, no M/R/G  Abdomen - Soft, NT/ND  Neurological:  MAEx4, No focal motor deficit, sensory loss  Extremities - Cap refil <2 sec in all ext., no edema  Skin -warm, dry      Hospital Course:  Clinical course has improved, labs and imaging reviewed. Benito Powell originally presented to the hospital on 9/30/2022  3:23 PM. with lower back pain. At that time it was determined that She required further observation and neurosurgery consult with subsequent procedures. She was admitted and labs and imaging were followed daily. Imaging results as above. She is medically stable to be discharged.        Disposition: Home    Patient stated that they will not drive themselves home from the hospital if they have gotten pain killers/ narcotics earlier that day and that they will arrange for transportation on their own or work with the  for a ride. Patient counseled NOT to drive while under the influence of narcotics/ pain killers. Condition: Good    Patient stable and ready for discharge home. I have discussed plan of care with patient and they are in understanding. They were instructed to read discharge paperwork. All of their questions and concerns were addressed. Time Spent: 7 day      --  Liam Monae DO  Emergency Medicine Resident Physician    This dictation was generated by voice recognition computer software. Although all attempts are made to edit the dictation for accuracy, there may be errors in the transcription that are not intended.

## 2022-10-09 NOTE — PROGRESS NOTES
Discharge instructions reviewed with patient. Patient sent home with 3 prescriptions and order for PT. Patient taken via wheelchair x 1 staff to waiting transportation.

## 2022-10-09 NOTE — PROGRESS NOTES
OBS/CDU   RESIDENT NOTE      Patients PCP is:  Alda Fields MD        SUBJECTIVE      No acute events overnight. Patient tolerating diet. Voiding and having bowel movements. Patient states she still continues to have pain in her lower back. Discussed with her that neurosurgery is planning to have follow-up with her that she should see her primary care physician soon. Patient is amenable to going home and recovering there. PHYSICAL EXAM      General: NAD, AO X 3  Heent: EMOI, PERRL  Neck: SUPPLE, NO JVD  Cardiovascular: RRR, S1S2  Pulmonary: CTAB, NO SOB  Abdomen: SOFT, NTTP, ND, +BS  Extremities: +2/4 PULSES DISTAL, NO SWELLING  Neuro / Psych: NO NUMBNESS OR TINGLING, MENTATION AT BASELINE    PERTINENT TEST /EXAMS      I have reviewed all available laboratory results.     MEDICATIONS CURRENT   morphine (PF) injection 2 mg, Q4H PRN  diazePAM (VALIUM) tablet 2 mg, Q6H PRN  latanoprost (XALATAN) 0.005 % ophthalmic solution 1 drop, Nightly  glucose chewable tablet 16 g, PRN  dextrose bolus 10% 125 mL, PRN   Or  dextrose bolus 10% 250 mL, PRN  glucagon (rDNA) injection 1 mg, PRN  dextrose 10 % infusion, Continuous PRN  cefTRIAXone (ROCEPHIN) 1,000 mg in sterile water 10 mL IV syringe, Once  cyclobenzaprine (FLEXERIL) tablet 10 mg, TID PRN  Empagliflozin-metFORMIN HCl 5-1000 MG TABS 1 tablet (Patient Supplied), BID  oxyCODONE (ROXICODONE) immediate release tablet 10 mg, Q4H PRN  lidocaine 4 % external patch 1 patch, Daily  atorvastatin (LIPITOR) tablet 40 mg, Nightly  busPIRone (BUSPAR) tablet 10 mg, TID  celecoxib (CELEBREX) capsule 100 mg, BID  furosemide (LASIX) tablet 10 mg, Every Other Day  gabapentin (NEURONTIN) capsule 300 mg, TID  glipiZIDE (GLUCOTROL) tablet 10 mg, BID AC  lisinopril (PRINIVIL;ZESTRIL) tablet 40 mg, Daily  magnesium oxide (MAG-OX) tablet 400 mg, Daily  pantoprazole (PROTONIX) tablet 40 mg, BID AC  rOPINIRole (REQUIP) tablet 2 mg, Nightly  sertraline (ZOLOFT) tablet 50 mg, Daily  sodium chloride flush 0.9 % injection 5-40 mL, 2 times per day  sodium chloride flush 0.9 % injection 5-40 mL, PRN  0.9 % sodium chloride infusion, PRN  ondansetron (ZOFRAN-ODT) disintegrating tablet 4 mg, Q8H PRN   Or  ondansetron (ZOFRAN) injection 4 mg, Q6H PRN  polyethylene glycol (GLYCOLAX) packet 17 g, Daily PRN  acetaminophen (TYLENOL) tablet 1,000 mg, 3 times per day      All medication charted and reviewed. CONSULTS      IP CONSULT TO NEUROSURGERY    ASSESSMENT/PLAN       Dale Reed is a 65 y.o. female who presents with   left lower back pain/sciatica pain. Neurosurgery consulted:  MRI lumbar spine without contrast:  Degenerative change most pronounced in the lower lumbar spine with mild right foraminal narrowing at L5-S1 and lateral recess narrowing that is worse on the right compared to the left. Status post sacroiliac joint injection under fluoroscopy. Per neurosurgery, if patient has not relief they will consider taking operative treatment for either L5-S1 disc rupture or perform S1 foraminal injection. Patient continues to have pain after the S1 nerve root block. Postop day 2. Neuro surgery reassessment:  Continue PT OT on discharge. Pain control. Follow-up with Dr. Ministerio Orozco in 4 to 6 weeks or sooner if symptoms worsen. Physical therapy consulted: They recommend 5 to 6 weeks of outpatient therapy for strengthening and functional mobility. Continue home medications and pain control  Monitor vitals, labs, and imaging  DISPO: Discharge today 10/9/2022    --  Ariel Pedro DO  Emergency Medicine Resident Physician     This dictation was generated by voice recognition computer software. Although all attempts are made to edit the dictation for accuracy, there may be errors in the transcription that are not intended.

## 2022-10-09 NOTE — PROGRESS NOTES
[facility]  CDU / OBSERVATION ENCOUNTER  ATTENDING NOTE       I performed a history and physical examination of the patient and discussed management with the resident or midlevel provider. I reviewed the resident or midlevel provider's note and agree with the documented findings and plan of care. Any areas of disagreement are noted on the chart. I was personally present for the key portions of any procedures. I have documented in the chart those procedures where I was not present during the key portions. I have reviewed the nurses notes. I agree with the chief complaint, past medical history, past surgical history, allergies, medications, social and family history as documented unless otherwise noted below. The Family history, social history, and ROS are effectively unchanged since admission unless noted elsewhere in the chart. Patient with some improvement of pain. Patient still having discomfort but pain is tolerable and treated with oral analgesics. Patient has follow-up scheduled.   Patient is discharged in good condition with analgesia and close follow-up already scheduled    Rachid Juarez MD  Attending Emergency  Physician

## 2022-10-09 NOTE — PROGRESS NOTES
Physical Therapy  Facility/Department: [address] ORTHO/MED SURG  Physical Therapy daily treatment note    Name: Gill Diana  : 1960  MRN: [id number]  Date of Service: 10/9/2022    Discharge Recommendations:  Patient would benefit from continued therapy after discharge   PT Equipment Recommendations  Equipment Needed: No      Patient Diagnosis(es): The primary encounter diagnosis was Sciatica of left side. A diagnosis of Back pain with radiation was also pertinent to this visit. Past Medical History:  has a past medical history of Cervical spondylosis, Generalized anxiety disorder, Hyperlipidemia, Hypertension, Lumbar radiculopathy, DIEGO (nonalcoholic steatohepatitis), Obesity, Osteoarthritis of left knee, Recurrent major depressive disorder ([facility] [address] Restless legs syndrome, TIA (transient ischemic attack), and Type II or unspecified type diabetes mellitus without mention of complication, not stated as uncontrolled. Past Surgical History:  has a past surgical history that includes Upper gastrointestinal endoscopy (2012); Colonoscopy (2012);  section; Hysterectomy, total abdominal (); shoulder surgery (Left, 2021); Back Injection (Left, 10/03/2022); Spine surgery (Left, 10/03/2022); Injection (Left, 10/06/2022); and Spine surgery (Left, 10/6/2022). Assessment   Body Structures, Functions, Activity Limitations Requiring Skilled Therapeutic Intervention: Decreased functional mobility ; Decreased strength;Decreased endurance; Increased pain  Assessment: Pt amb 175 ft with RW and up to CGA . Limitd by increased c/o pain this date. Recommend contiuned PT after d/c to address deficits  Therapy Prognosis: Good  Activity Tolerance  Activity Tolerance: Patient limited by pain; Patient tolerated treatment well     Plan   Physcial Therapy Plan  General Plan:  (5-6x)  Current Treatment Recommendations: Strengthening, Functional mobility training, Transfer training, Stair training, Gait training, Safety education & training, Balance training, Therapeutic activities, Equipment evaluation, education, & procurement, Home exercise program, Patient/Caregiver education & training, Endurance training  Safety Devices  Type of Devices: Nurse notified, Call light within reach, Gait belt, Patient at risk for falls, Left in bed  Restraints  Restraints Initially in Place: No     Restrictions  Restrictions/Precautions  Restrictions/Precautions: Up as Tolerated, Fall Risk  Required Braces or Orthoses?: No  Position Activity Restriction  Other position/activity restrictions: L SI nerve root injection 10/6. L SI joint injection 10/03/2022. Pt reports hx of cubital tunnel with plans for release this month     Subjective   General  Patient assessed for rehabilitation services?: Yes  Response To Previous Treatment: Patient with no complaints from previous session. Family / Caregiver Present: No  Follows Commands: Within Functional Limits  Subjective  Subjective: Pt resting in recliner upon arrival, Agreeable to PT, c/o 9/10 back pain and requesting to get back into bed. Cognition   Orientation  Overall Orientation Status: Within Functional Limits     Objective      Bed mobility  Rolling to Right: Stand by assistance  Sit to Supine: Stand by assistance  Scooting: Stand by assistance  Bed Mobility Comments: Increased time to complete due to c/o leg pain  Transfers  Sit to Stand: Stand by assistance  Stand to Sit: Stand by assistance  Ambulation  Surface: Level tile  Device: Rolling Walker  Assistance: Contact guard assistance  Quality of Gait: very slow javed, short, shuffled steps, no LOB  Distance: 175 ft  More Ambulation?: No     Balance  Posture: Good  Sitting - Static: Good  Sitting - Dynamic: Good  Standing - Static: Good;-  Standing - Dynamic: Fair;+  Comments: RW used to assess standing balance.    Exercise  Supine ankle pumps, pelvic tilts, SLR, heel slide, quad set x 10, with increased c/o pain AM-PAC Score  AM-PAC Inpatient Mobility Raw Score : 19 (10/09/22 1325)  AM-PAC Inpatient T-Scale Score : 45.44 (10/09/22 1325)  Mobility Inpatient CMS 0-100% Score: 41.77 (10/09/22 1325)  Mobility Inpatient CMS G-Code Modifier : CK (10/09/22 1325)     Goals  Short Term Goals  Time Frame for Short Term Goals: 10 visits  Short Term Goal 1: transfers with SBA  Short Term Goal 2: amb 300 ft with a RW x SBA  Short Term Goal 3: ascend/descend 4 steps with SBA  Short Term Goal 4: 20 min strengthening exercise program x SBA  Additional Goals?: No     Therapy Time   Individual Concurrent Group Co-treatment   Time In 0945         Time Out 1010         Minutes 25         Timed Code Treatment Minutes: 25 Minutes       Guille Lozano, PTA

## 2022-10-09 NOTE — DISCHARGE INSTRUCTIONS
-Please follow-up with your primary care physician within the week following your recent stay in the hospital.    -Neurosurgery has placed follow-up information in your discharge summary. Please schedule an appointment as soon as possible for visit in 4 weeks. If your symptoms worsen please call the neurosurgery clinic and schedule an earlier time.    -You have been given a short duration prescription for the following: Flexeril, Roxicodone, Valium. Please use this medication only as needed for your back pain. Do not take all of them at once. Space them out at minimum 1 hour from each other to avoid oversedation. Do not operate any heavy machinery until you know the effects of this medication. Be aware that these are powerful medications and should be used sparingly.      -Pain medication such as Roxicodone can cause constipation. If this begins to occur please increase your fluids and use over-the-counter stool softeners to help you pass bowel movements. If you begin to experience abdominal pain, changes in bowel movements and/or are not passing gas please go to the nearest urgent center or emergency department for evaluation.    -If you experience any of the following symptoms acutely please notify your primary care physician or return to the emergency department for reassessment: Headache, fever, chills, nausea, vomiting, chest pain, shortness of breath, abdominal pain, decreased intake of food or water, changes with urination or bowel movements, pain not responding to medications, swelling in your extremities, change in sensation in your extremities and/or any change in your baseline health.

## 2022-10-09 NOTE — CARE COORDINATION
Discharge [facility] Case Management Department  Written by: Darrell Sheehan RN    Patient Name: Myrtle Ly  Attending Provider: Rowan Bah MD  Admit Date: 2022  3:23 PM  MRN: [id number]  Account: [de-identified]                     : 1960  Discharge Date: 10/9/22      Disposition: home    Met with patient to discuss transitional planning. Plan is home with spouse. Patient denies need for additional DME or services. Patient has ride home.  Patient agreeable to Anais Wu RN

## 2022-10-10 ENCOUNTER — CARE COORDINATION (OUTPATIENT)
Dept: OTHER | Facility: CLINIC | Age: 62
End: 2022-10-10

## 2022-10-10 NOTE — CARE COORDINATION
[facility] Care Transitions Initial Follow Up Call    Call within 2 business days of discharge: Yes    Care Transition Nurse contacted the patient by telephone to perform post hospital discharge assessment. Verified name and  with patient as identifiers. Provided introduction to self, and explanation of the Care Transition Nurse role. Patient: Kailash Gambino Patient : 1960   MRN: [id number]  Reason for Admission: Sciatica of left side  Discharge Date: 10/9/22 RARS: Readmission Risk Score: 9.4      Last Discharge  Street       Date Complaint Diagnosis Description Type Department Provider    22 Back Pain Sciatica of left side . .. ED to Hosp-Admission (Discharged) (ADMITTED) QUINCY Kwon MD; Jose Martin Hudson... Was this an external facility discharge? No Discharge Facility: [facility] [address] to be reviewed by the provider   Additional needs identified to be addressed with provider: No  none               Method of communication with provider: none. Pt is in some pain this morning. She is waiting for her prescriptions to be filled at [facility]. Her daughter has already dropped them off and is going to pick them up as soon as they are ready. Pain is 8/10 Pt and I discussed her starting one of the three medications when she gets them I reminded her that all the medications that have been ordered will most likely make her drowsy so to start with only one to begin with to see how she tolerates it. She understands. Pt is walking but doing so very slowly she said, she does have someone home with her to assist she said. She denies fever. She is eating and drinking well, no nausea or vomiting. Urinating per her usual she said. Pt states she has not had a chance to check her blood sugars since getting home. Pt said her discharge instructions are pretty clear and she has no questions r/t to that.  She is calling this morning to make her follow up with Dr Amanda Martin, neurosurgery. Will forward note to following ACM on our team for further follow up       Care Transition Nurse reviewed discharge instructions with patient who verbalized understanding. The patient was given an opportunity to ask questions and does not have any further questions or concerns at this time. Were discharge instructions available to patient? Yes. Reviewed appropriate site of care based on symptoms and resources available to patient including: Specialist. The patient agrees to contact the PCP office for questions related to their healthcare. Advance Care Planning:   Does patient have an Advance Directive: not on file. Medication reconciliation was performed with patient, who verbalizes understanding of administration of home medications.  Medications reviewed, 1111F entered: N/A    Was patient discharged with a pulse oximeter? no    Non-face-to-face services provided:  Obtained and reviewed discharge summary and/or continuity of care documents    Offered patient enrollment in the Remote Patient Monitoring (RPM) program for in-home monitoring: NA.    Care Transitions 24 Hour Call    Do you have a copy of your discharge instructions?: Yes  Do you have all of your prescriptions and are they filled?: Yes (Comment: Currently being filled at the pharmacy at [facility] her daughter will be picking them up shortly)  Have you been contacted by a [address]?: No  Have you scheduled your follow up appointment?: No (Comment: Pt is calling today, office did not open til 10 am)  Do you have support at home?: Partner/Spouse/SO, Child  Do you feel like you have everything you need to keep you well at home?: Yes  Are you an active caregiver in your home?: Yes  Care Transitions Interventions         Follow Up  Future Appointments   Date Time Provider Nicola Zaidi   10/25/2022  9:30 AM Estelle Andrews, [address] [place] [address],[address] [place] [address]   11/1/2022  3:00 PM ORLY Pulido CNP [facility]   11/9/2022  2:00 PM Josefina Armijo MD Neuro [facility]   12/20/2022 10:30 AM DO Artemio [facility] Transition Nurse provided contact information. Plan for follow-up call in 5-7 days based on severity of symptoms and risk factors.   Plan for next call:  Pain level, ambulation tolerance, intake, elimination pattern, verify follow up appt made, blood sugars     Julio Stern RN

## 2022-10-11 ENCOUNTER — TELEPHONE (OUTPATIENT)
Dept: NEUROSURGERY | Age: 62
End: 2022-10-11

## 2022-10-11 NOTE — TELEPHONE ENCOUNTER
Called patient and reminded her to stop blood thinners and pain medication now as it's 6 days before surgery.  Also reminded patient to be aware of her blood sugar and to contact PCP so those are under control before surgery

## 2022-10-11 NOTE — TELEPHONE ENCOUNTER
PAT called concerned about patient's surgery Monday. States she just contacted patient and patient was on pain medication. Is concerned cause patient is currently on pain medication and blood thinners and admitted to not checking her blood sugar since being home. States that patient seems to be in a lot of pain from back and unable to focus on anything else.

## 2022-10-14 ENCOUNTER — HOSPITAL ENCOUNTER (INPATIENT)
Age: 62
LOS: 10 days | Discharge: HOME | DRG: 552 | End: 2022-10-25
Attending: EMERGENCY MEDICINE | Admitting: EMERGENCY MEDICINE
Payer: COMMERCIAL

## 2022-10-14 ENCOUNTER — CARE COORDINATION (OUTPATIENT)
Dept: OTHER | Facility: CLINIC | Age: 62
End: 2022-10-14

## 2022-10-14 ENCOUNTER — TELEPHONE (OUTPATIENT)
Dept: NEUROSURGERY | Age: 62
End: 2022-10-14

## 2022-10-14 DIAGNOSIS — M54.9 INTRACTABLE BACK PAIN: ICD-10-CM

## 2022-10-14 DIAGNOSIS — M54.32 LEFT SCIATIC NERVE PAIN: Primary | ICD-10-CM

## 2022-10-14 PROBLEM — M54.30 SCIATICA: Status: ACTIVE | Noted: 2022-10-14

## 2022-10-14 LAB
ABSOLUTE EOS #: 0.5 K/UL (ref 0–0.44)
ABSOLUTE IMMATURE GRANULOCYTE: 0.05 K/UL (ref 0–0.3)
ABSOLUTE LYMPH #: 2.94 K/UL (ref 1.1–3.7)
ABSOLUTE MONO #: 1.28 K/UL (ref 0.1–1.2)
ANION GAP SERPL CALCULATED.3IONS-SCNC: 11 MMOL/L (ref 9–17)
BASOPHILS # BLD: 1 % (ref 0–2)
BASOPHILS ABSOLUTE: 0.06 K/UL (ref 0–0.2)
BUN BLDV-MCNC: 15 MG/DL (ref 8–23)
CALCIUM SERPL-MCNC: 9.3 MG/DL (ref 8.6–10.4)
CHLORIDE BLD-SCNC: 101 MMOL/L (ref 98–107)
CO2: 25 MMOL/L (ref 20–31)
CREAT SERPL-MCNC: 0.83 MG/DL (ref 0.5–0.9)
EOSINOPHILS RELATIVE PERCENT: 4 % (ref 1–4)
GFR SERPL CREATININE-BSD FRML MDRD: >60 ML/MIN/1.73M2
GLUCOSE BLD-MCNC: 149 MG/DL (ref 70–99)
HCT VFR BLD CALC: 42.1 % (ref 36.3–47.1)
HEMOGLOBIN: 13.5 G/DL (ref 11.9–15.1)
IMMATURE GRANULOCYTES: 0 %
INR BLD: 0.9
LYMPHOCYTES # BLD: 22 % (ref 24–43)
MCH RBC QN AUTO: 27.5 PG (ref 25.2–33.5)
MCHC RBC AUTO-ENTMCNC: 32.1 G/DL (ref 28.4–34.8)
MCV RBC AUTO: 85.7 FL (ref 82.6–102.9)
MONOCYTES # BLD: 10 % (ref 3–12)
NRBC AUTOMATED: 0 PER 100 WBC
PARTIAL THROMBOPLASTIN TIME: 20.2 SEC (ref 20.5–30.5)
PDW BLD-RTO: 14.2 % (ref 11.8–14.4)
PLATELET # BLD: 344 K/UL (ref 138–453)
PMV BLD AUTO: 10.3 FL (ref 8.1–13.5)
POTASSIUM SERPL-SCNC: 4.5 MMOL/L (ref 3.7–5.3)
PROTHROMBIN TIME: 10.2 SEC (ref 9.1–12.3)
RBC # BLD: 4.91 M/UL (ref 3.95–5.11)
SARS-COV-2, RAPID: NOT DETECTED
SEG NEUTROPHILS: 63 % (ref 36–65)
SEGMENTED NEUTROPHILS ABSOLUTE COUNT: 8.43 K/UL (ref 1.5–8.1)
SODIUM BLD-SCNC: 137 MMOL/L (ref 135–144)
SPECIMEN DESCRIPTION: NORMAL
WBC # BLD: 13.3 K/UL (ref 3.5–11.3)

## 2022-10-14 PROCEDURE — 6370000000 HC RX 637 (ALT 250 FOR IP): Performed by: EMERGENCY MEDICINE

## 2022-10-14 PROCEDURE — 85025 COMPLETE CBC W/AUTO DIFF WBC: CPT

## 2022-10-14 PROCEDURE — 6360000002 HC RX W HCPCS: Performed by: STUDENT IN AN ORGANIZED HEALTH CARE EDUCATION/TRAINING PROGRAM

## 2022-10-14 PROCEDURE — G0378 HOSPITAL OBSERVATION PER HR: HCPCS

## 2022-10-14 PROCEDURE — 87635 SARS-COV-2 COVID-19 AMP PRB: CPT

## 2022-10-14 PROCEDURE — 96375 TX/PRO/DX INJ NEW DRUG ADDON: CPT

## 2022-10-14 PROCEDURE — 96374 THER/PROPH/DIAG INJ IV PUSH: CPT

## 2022-10-14 PROCEDURE — 85610 PROTHROMBIN TIME: CPT

## 2022-10-14 PROCEDURE — 2580000003 HC RX 258: Performed by: STUDENT IN AN ORGANIZED HEALTH CARE EDUCATION/TRAINING PROGRAM

## 2022-10-14 PROCEDURE — 6360000002 HC RX W HCPCS: Performed by: EMERGENCY MEDICINE

## 2022-10-14 PROCEDURE — 96376 TX/PRO/DX INJ SAME DRUG ADON: CPT

## 2022-10-14 PROCEDURE — 80048 BASIC METABOLIC PNL TOTAL CA: CPT

## 2022-10-14 PROCEDURE — 99285 EMERGENCY DEPT VISIT HI MDM: CPT

## 2022-10-14 PROCEDURE — 85730 THROMBOPLASTIN TIME PARTIAL: CPT

## 2022-10-14 RX ORDER — ONDANSETRON 2 MG/ML
4 INJECTION INTRAMUSCULAR; INTRAVENOUS EVERY 4 HOURS PRN
Status: DISCONTINUED | OUTPATIENT
Start: 2022-10-14 | End: 2022-10-25 | Stop reason: HOSPADM

## 2022-10-14 RX ORDER — ALBUTEROL SULFATE 90 UG/1
2 AEROSOL, METERED RESPIRATORY (INHALATION) EVERY 6 HOURS PRN
COMMUNITY

## 2022-10-14 RX ORDER — GLIPIZIDE 10 MG/1
10 TABLET ORAL
COMMUNITY

## 2022-10-14 RX ORDER — OXYCODONE HYDROCHLORIDE 5 MG/1
5 TABLET ORAL EVERY 4 HOURS PRN
Status: ON HOLD | COMMUNITY
End: 2022-11-23 | Stop reason: HOSPADM

## 2022-10-14 RX ORDER — MORPHINE SULFATE 4 MG/ML
4 INJECTION, SOLUTION INTRAMUSCULAR; INTRAVENOUS ONCE
Status: DISCONTINUED | OUTPATIENT
Start: 2022-10-14 | End: 2022-10-25 | Stop reason: HOSPADM

## 2022-10-14 RX ORDER — GABAPENTIN 300 MG/1
300 CAPSULE ORAL 2 TIMES DAILY
COMMUNITY
End: 2022-11-09

## 2022-10-14 RX ORDER — SODIUM CHLORIDE 9 MG/ML
INJECTION, SOLUTION INTRAVENOUS CONTINUOUS
Status: DISCONTINUED | OUTPATIENT
Start: 2022-10-14 | End: 2022-10-16

## 2022-10-14 RX ORDER — ACETAMINOPHEN 325 MG/1
650 TABLET ORAL EVERY 6 HOURS PRN
Status: DISCONTINUED | OUTPATIENT
Start: 2022-10-14 | End: 2022-10-25 | Stop reason: HOSPADM

## 2022-10-14 RX ORDER — POTASSIUM CHLORIDE 7.45 MG/ML
10 INJECTION INTRAVENOUS PRN
Status: DISCONTINUED | OUTPATIENT
Start: 2022-10-14 | End: 2022-10-25 | Stop reason: HOSPADM

## 2022-10-14 RX ORDER — SODIUM CHLORIDE 0.9 % (FLUSH) 0.9 %
5-40 SYRINGE (ML) INJECTION EVERY 12 HOURS SCHEDULED
Status: DISCONTINUED | OUTPATIENT
Start: 2022-10-14 | End: 2022-10-25 | Stop reason: HOSPADM

## 2022-10-14 RX ORDER — ROPINIROLE 1 MG/1
2 TABLET, FILM COATED ORAL NIGHTLY
Status: DISCONTINUED | OUTPATIENT
Start: 2022-10-14 | End: 2022-10-16

## 2022-10-14 RX ORDER — ONDANSETRON 2 MG/ML
4 INJECTION INTRAMUSCULAR; INTRAVENOUS ONCE
Status: CANCELLED | OUTPATIENT
Start: 2022-10-14 | End: 2022-10-14

## 2022-10-14 RX ORDER — CELECOXIB 100 MG/1
100 CAPSULE ORAL 2 TIMES DAILY
COMMUNITY

## 2022-10-14 RX ORDER — ASPIRIN 81 MG/1
81 TABLET ORAL DAILY
COMMUNITY
End: 2022-11-08

## 2022-10-14 RX ORDER — ACETAMINOPHEN 650 MG/1
650 SUPPOSITORY RECTAL EVERY 6 HOURS PRN
Status: DISCONTINUED | OUTPATIENT
Start: 2022-10-14 | End: 2022-10-25 | Stop reason: HOSPADM

## 2022-10-14 RX ORDER — BUSPIRONE HYDROCHLORIDE 10 MG/1
10 TABLET ORAL 2 TIMES DAILY
COMMUNITY

## 2022-10-14 RX ORDER — EMPAGLIFLOZIN AND METFORMIN HYDROCHLORIDE 5; 1000 MG/1; MG/1
1000 TABLET ORAL
COMMUNITY

## 2022-10-14 RX ORDER — FUROSEMIDE 20 MG/1
20 TABLET ORAL DAILY
COMMUNITY

## 2022-10-14 RX ORDER — ENOXAPARIN SODIUM 100 MG/ML
40 INJECTION SUBCUTANEOUS DAILY
Status: DISCONTINUED | OUTPATIENT
Start: 2022-10-15 | End: 2022-10-25 | Stop reason: HOSPADM

## 2022-10-14 RX ORDER — SODIUM CHLORIDE 9 MG/ML
25 INJECTION, SOLUTION INTRAVENOUS PRN
Status: DISCONTINUED | OUTPATIENT
Start: 2022-10-14 | End: 2022-10-25 | Stop reason: HOSPADM

## 2022-10-14 RX ORDER — ATORVASTATIN CALCIUM 40 MG/1
40 TABLET, FILM COATED ORAL NIGHTLY
COMMUNITY

## 2022-10-14 RX ORDER — MORPHINE SULFATE 4 MG/ML
4 INJECTION, SOLUTION INTRAMUSCULAR; INTRAVENOUS
Status: COMPLETED | OUTPATIENT
Start: 2022-10-14 | End: 2022-10-16

## 2022-10-14 RX ORDER — DIAZEPAM 2 MG/1
2 TABLET ORAL EVERY 6 HOURS PRN
Status: ON HOLD | COMMUNITY
End: 2022-10-25 | Stop reason: SDUPTHER

## 2022-10-14 RX ORDER — DULAGLUTIDE 1.5 MG/.5ML
1.5 INJECTION, SOLUTION SUBCUTANEOUS WEEKLY
COMMUNITY

## 2022-10-14 RX ORDER — KETOROLAC TROMETHAMINE 30 MG/ML
15 INJECTION, SOLUTION INTRAMUSCULAR; INTRAVENOUS EVERY 6 HOURS PRN
Status: DISPENSED | OUTPATIENT
Start: 2022-10-14 | End: 2022-10-16

## 2022-10-14 RX ORDER — MORPHINE SULFATE 4 MG/ML
4 INJECTION, SOLUTION INTRAMUSCULAR; INTRAVENOUS ONCE
Status: COMPLETED | OUTPATIENT
Start: 2022-10-14 | End: 2022-10-14

## 2022-10-14 RX ORDER — LISINOPRIL 40 MG/1
40 TABLET ORAL DAILY
COMMUNITY
End: 2022-11-30 | Stop reason: SDUPTHER

## 2022-10-14 RX ORDER — CYCLOBENZAPRINE HCL 10 MG
10 TABLET ORAL 3 TIMES DAILY PRN
COMMUNITY

## 2022-10-14 RX ORDER — SODIUM CHLORIDE 0.9 % (FLUSH) 0.9 %
5-40 SYRINGE (ML) INJECTION PRN
Status: DISCONTINUED | OUTPATIENT
Start: 2022-10-14 | End: 2022-10-25 | Stop reason: HOSPADM

## 2022-10-14 RX ORDER — POLYETHYLENE GLYCOL 3350 17 G/17G
17 POWDER, FOR SOLUTION ORAL DAILY PRN
Status: DISCONTINUED | OUTPATIENT
Start: 2022-10-14 | End: 2022-10-25 | Stop reason: HOSPADM

## 2022-10-14 RX ORDER — MAGNESIUM OXIDE 400 MG/1
400 TABLET ORAL DAILY
COMMUNITY
End: 2022-11-10 | Stop reason: SDUPTHER

## 2022-10-14 RX ORDER — CYCLOBENZAPRINE HCL 10 MG
10 TABLET ORAL 3 TIMES DAILY PRN
Status: DISCONTINUED | OUTPATIENT
Start: 2022-10-14 | End: 2022-10-15

## 2022-10-14 RX ORDER — OMEPRAZOLE 20 MG/1
20 CAPSULE, DELAYED RELEASE ORAL DAILY
COMMUNITY

## 2022-10-14 RX ORDER — ONDANSETRON 2 MG/ML
4 INJECTION INTRAMUSCULAR; INTRAVENOUS ONCE
Status: COMPLETED | OUTPATIENT
Start: 2022-10-14 | End: 2022-10-14

## 2022-10-14 RX ORDER — DOCUSATE SODIUM 100 MG/1
100 CAPSULE, LIQUID FILLED ORAL 2 TIMES DAILY PRN
COMMUNITY

## 2022-10-14 RX ORDER — MORPHINE SULFATE 4 MG/ML
10 INJECTION, SOLUTION INTRAMUSCULAR; INTRAVENOUS ONCE
Status: DISCONTINUED | OUTPATIENT
Start: 2022-10-14 | End: 2022-10-14

## 2022-10-14 RX ORDER — POTASSIUM CHLORIDE 20 MEQ/1
40 TABLET, EXTENDED RELEASE ORAL PRN
Status: DISCONTINUED | OUTPATIENT
Start: 2022-10-14 | End: 2022-10-25 | Stop reason: HOSPADM

## 2022-10-14 RX ORDER — ROPINIROLE 2 MG/1
2 TABLET, FILM COATED ORAL NIGHTLY
COMMUNITY

## 2022-10-14 RX ADMIN — CYCLOBENZAPRINE 10 MG: 10 TABLET, FILM COATED ORAL at 22:46

## 2022-10-14 RX ADMIN — ROPINIROLE HYDROCHLORIDE 2 MG: 1 TABLET, FILM COATED ORAL at 22:48

## 2022-10-14 RX ADMIN — KETOROLAC TROMETHAMINE 15 MG: 30 INJECTION, SOLUTION INTRAMUSCULAR; INTRAVENOUS at 22:07

## 2022-10-14 RX ADMIN — SODIUM CHLORIDE, PRESERVATIVE FREE 10 ML: 5 INJECTION INTRAVENOUS at 22:07

## 2022-10-14 RX ADMIN — MORPHINE SULFATE 4 MG: 4 INJECTION INTRAVENOUS at 19:45

## 2022-10-14 RX ADMIN — SODIUM CHLORIDE: 9 INJECTION, SOLUTION INTRAVENOUS at 22:06

## 2022-10-14 RX ADMIN — ONDANSETRON 4 MG: 2 INJECTION INTRAMUSCULAR; INTRAVENOUS at 19:45

## 2022-10-14 ASSESSMENT — PAIN DESCRIPTION - ORIENTATION
ORIENTATION: LEFT;RIGHT;LOWER
ORIENTATION: RIGHT;LEFT;LOWER
ORIENTATION: LEFT
ORIENTATION: LEFT

## 2022-10-14 ASSESSMENT — PAIN - FUNCTIONAL ASSESSMENT: PAIN_FUNCTIONAL_ASSESSMENT: 0-10

## 2022-10-14 ASSESSMENT — PAIN SCALES - GENERAL
PAINLEVEL_OUTOF10: 10

## 2022-10-14 ASSESSMENT — PAIN DESCRIPTION - DESCRIPTORS
DESCRIPTORS: THROBBING;STABBING
DESCRIPTORS: STABBING;THROBBING
DESCRIPTORS: SHARP;SHOOTING

## 2022-10-14 ASSESSMENT — PAIN DESCRIPTION - LOCATION
LOCATION: BACK;HIP
LOCATION: HIP
LOCATION: HIP
LOCATION: BACK

## 2022-10-14 ASSESSMENT — PAIN DESCRIPTION - PAIN TYPE: TYPE: ACUTE PAIN

## 2022-10-14 ASSESSMENT — PAIN DESCRIPTION - FREQUENCY: FREQUENCY: CONTINUOUS

## 2022-10-14 NOTE — TELEPHONE ENCOUNTER
Patient is calling to report that she feels as though she would like to cancel her Arm Surgery on Monday, 10/17 because she is currently experiencing enormous amounts of back and leg pain. She states that if gets arm surgery, she would not be able to lift herself on the bed or do normal activities with both leg and arm pain.

## 2022-10-14 NOTE — CARE COORDINATION
[facility] Care Transitions Follow Up Call    Care Transition Nurse contacted the patient by telephone to follow up after admission on 2022. Verified name and  with patient as identifiers. Patient: Marco Antonio Borja  Patient : 1960   MRN: [id number]  Reason for Admission: sciatica of left side  Discharge Date: 10/9/22 RARS: Readmission Risk Score: 9.4      Needs to be reviewed by the provider   Additional needs identified to be addressed with provider: Yes  Patient is in a lot of pain. Only taking arthritis tylenol. Requesting an appt with neurosurgeon ASAP for sciatica pain. Method of communication with provider: phone. Patient states she is not doing well. She is in a lot of pain. States she received a call from neurosurgeon's office earlier this week stating she needed to stop all pain medication and blood thinners 6 days prior to surgery for cubital tunnel release. Patient states she is only taking arthritis tylenol. Patient states she is in a lot of pain that radiates from her back down her leg. Patient states her muscles are very tight and can feel lumps. She can barely stand. States she is miserable. Patient states the injections she had in the hospital did not work. States she would like to discuss with Dr. Albert Jordan about having a discectomy. Patient states she is home alone most of the day. Her daughter is there now. Patient is using a cane to ambulate but states she will start using a walker due to the amount of pain she is in. Patient states pain medication and muscle relaxers were not helping much when she was taking them. Patient states she made the decision today that she would like to cancel her cubital tunnel release surgery due to the amount of sciatica pain. States she can not get up without using her arms. Patient is very tearful on phone. Patient did not want to cancel this surgery since she needs it done and her insurance benefits run out at the end of the year. Patient states she was told by Dr. Hiro Riojas office when she scheduled surgery that they did not have anything until after the first of the year in 2023. Patient states she has called and left a vm message on Dr. Hiro Riojas surgery scheduler's vm advising them that she wants to cancel. I advised patient if she is in this amount of pain, she will most likely have to go to ED if it is not controlled at home. Patient verbalized understanding and states she prefers to stay out of the hospital. Patient would like an appointment as soon as possible with neurosurgery. I called and spoke with neurosurgery's office and advised them of this situation. They advised if patient is in that amount of pain, she should go to ER. States they will call her and direct her to go to ER. Patient called and thanked me for calling neurosurgery. Patient states she is on her way to [facility]'s ED now. Office is not cancelling surgery yet- will wait and see how she does. I advised patient I will call her after discharge. Addressed changes since last contact:   see note above  Discussed follow-up appointments. If no appointment was previously scheduled, appointment scheduling offered: Yes. Is follow up appointment scheduled within 7 days of discharge? N/a. Follow Up  Future Appointments   Date Time Provider Nicola Zaidi   10/25/2022  9:30 AM Lashon Mars MD [address] [place] [address]   11/1/2022  3:00 PM ORLY Bravo CNP [facility]   11/9/2022  2:00 PM Omkar Moreno MD Neuro [facility]   12/20/2022 10:30 AM Lord Lakia DO [facility]     Non-[facility] follow up appointment(s):     Care Transition Nurse reviewed discharge instructions and red flags with patient and discussed any barriers to care and/or understanding of plan of care after discharge.  Discussed appropriate site of care based on symptoms and resources available to patient including: PCP  Specialist  Benefits related nurse triage line  Urgent care clinics  [facility] 24/7  When to call [address] The patient agrees to contact the PCP office for questions related to their healthcare. Advance Care Planning:   not on file. Patients top risk factors for readmission: medical condition-sciatica pain  Interventions to address risk factors: Obtained and reviewed discharge summary and/or continuity of care documents, Communication with specialists who will assume or re-assume care of the patient's system-specific problems-call to neurosurgery, and Education of patient/family/caregiver/guardian to support self-management-discussed red flags and if pain is not manageable at home, she will need to go to ER. Offered patient enrollment in the Remote Patient Monitoring (RPM) program for in-home monitoring: NA.     Care Transitions Subsequent and Final Call    Subsequent and Final Calls  Do you have any ongoing symptoms?: Yes  Onset of Patient-reported symptoms: Other  Patient-reported symptoms: Pain  Interventions for patient-reported symptoms: Notified PCP/Physician  Have your medications changed?: No  Do you have any questions related to your medications?: No  Do you currently have any active services?: No  Do you have any needs or concerns that I can assist you with?: No  Identified Barriers: None  Care Transitions Interventions  Other Interventions:             Care Transition Nurse provided contact information for future needs. Plan for follow up call after discharge  based on severity of symptoms and risk factors. Plan for next call:  outcome of ER visit.     Airam Pena RN

## 2022-10-14 NOTE — ED PROVIDER NOTES
[facility]  ED  Emergency Department Encounter  Emergency Medicine Resident     Pt Name: Ayan Sanchez  MRN: [id number]  Lannytrongfcarey 1960  Date of evaluation: 10/14/22  PCP:  No primary care provider on file. CHIEF COMPLAINT       Chief Complaint   Patient presents with    Back Pain     Mainly left sided       HISTORY OFPRESENT ILLNESS  (Location/Symptom, Timing/Onset, Context/Setting, Quality, Duration, Modifying Leamon Jagdeep.)      Ayan Sanchez is a 65 y.o. female with past medical history significant for headache, left-sided. No new injuries. No new trauma. Patient unable to tolerate being at home with no p.o. medication secondary to neurosurgery team recommending that she stops taking p.o. medications for upcoming surgery. Patient has been unable unable to eat or drink secondary to pain. No new injury. Continued left sciatic pain. No numbness or tingling of the vagina or rectum. No chest pain or shortness of breath. 10 out of 10 in severity. Extensive history of similar. Previous admission at [facility] for similar complaints. Recent admission to our observation unit under Dr. Bryan Dorman for similar complaints. Discussed with neurosurgery outpatient today and they recommended the patient come in for continued pain control. PAST MEDICAL / SURGICAL / SOCIAL / FAMILY HISTORY      has no past medical history on file. has no past surgical history on file. Social:      Family Hx: No family history on file. Allergies:  Patient has no known allergies.     Home Medications:  Prior to Admission medications    Not on File       REVIEW OFSYSTEMS    (2-9 systems for level 4, 10 or more for level 5)      Positive: Left-sided pain, left elbow pain, chronic pain, unable to eat or drink secondary to pain, numbness, tingling of left lower extremity     Negative: Fevers, chills, headache, eye pain, ear pain, difficulty swallowing, chest pain, shortness of breath, abdominal pain, nausea, vomiting, dysuria, diarrhea, constipation,       PHYSICAL EXAM   (up to 7 for level 4, 8 or more forlevel 5)      INITIAL VITALS:   Vitals:    10/14/22 1818   BP: (!) 144/94   Pulse: 98   Resp: 22   Temp: 98 °F (36.7 °C)   SpO2: 99%        Physical Exam  Vitals and nursing note reviewed. Constitutional:       Comments: Patient is alert, oriented, answering questions appropriately, tearful on examination, appears uncomfortable although is in no acute respiratory distress. Moving all 4 extremities. HENT:      Head: Normocephalic and atraumatic. Nose: Nose normal.      Mouth/Throat:      Mouth: Mucous membranes are moist.      Pharynx: Oropharynx is clear. Eyes:      General:         Right eye: No discharge. Left eye: No discharge. Neck:      Comments: Moving neck freely on my examination, no rigidity noted    Cardiovascular:      Rate and Rhythm: Normal rate and regular rhythm. Pulses: Normal pulses. Heart sounds: Normal heart sounds. Pulmonary:      Effort: Pulmonary effort is normal. No respiratory distress. Breath sounds: Normal breath sounds. No wheezing. Comments: Speaking in full sentences, no acute respiratory distress  Abdominal:      General: There is no distension. Palpations: Abdomen is soft. Tenderness: There is no abdominal tenderness. There is no guarding or rebound. Musculoskeletal:      Right lower leg: No edema. Left lower leg: No edema. Comments: Significant tenderness to palpation of the left sciatic region. Tibial and dorsalis pedis pulse intact in left lower extremity. No mottling noted. Skin:     General: Skin is warm and dry. Neurological:      General: No focal deficit present. Mental Status: She is alert and oriented to person, place, and time.    Psychiatric:         Mood and Affect: Mood normal.       DIFFERENTIAL  DIAGNOSIS       Initial MDM/Plan: 65 y.o. female who presents with left sciatic pain. Patient sent in by neurosurgery team reportedly as outpatient. Pulm initial examination patient is tearful, appears to be in pain although is in no acute distress, no respiratory distress. Vital signs are within normal limits with exception of mild hypertension at 144/94, 6 suspect secondary to pain. Otherwise patient is afebrile, nontachycardic, saturating 99% on room air. Patient is nontoxic, non-lethargic. Physical exam is remarkable for significant tenderness to the left sciatic region. No new injuries, no falls, no injuries to the area. Patient does not require any further imaging at this time. Plan for admission to observation unit for pain control. Pain control here in the emergency department with morphine. Zofran for concerns for possible nausea with morphine. CBC, BMP, coags for possible surgical intervention per neurosurgery tomorrow. Inpatient neurosurgery consult. Patient and  both compliant and understanding of this plan. Patient admitted to observation unit in vitally stable condition after remaining vitally stable throughout Emergency Department stay. Patient boarding in the emergency department awaiting bed placement. COVID test for placement she has no COVID symptoms. DIAGNOSTIC RESULTS / EMERGENCYDEPARTMENT COURSE / MDM     LABS:  Labs Reviewed   COVID-19, RAPID   CBC WITH AUTO DIFFERENTIAL   BASIC METABOLIC PANEL   PROTIME-INR   APTT           PROCEDURES:  None    CONSULTS:  IP CONSULT TO NEUROSURGERY      FINAL IMPRESSION      1. Left sciatic nerve pain          DISPOSITION / PLAN     DISPOSITION Admitted 10/14/2022 07:05:52 PM      PATIENT REFERRED TO:  No follow-up provider specified.     DISCHARGE MEDICATIONS:  New Prescriptions    No medications on file       Ros Moulton DO  Emergency Medicine Resident    (Please note that portions of this note were completed with a voice recognition program.Efforts were made to edit the dictations but occasionally words are mis-transcribed.)        Lashanda Alonso, DO  Resident  10/14/22 1920

## 2022-10-14 NOTE — ED PROVIDER NOTES
[facility]     Emergency Department     Faculty Note/ Attestation      Pt Name: Kedar Watkins                                       MRN: [id number]  [place] 1960  Date of evaluation: 10/14/2022    Patients PCP:    No primary care provider on file. Attestation  I performed a history and physical examination of the patient and discussed management with the resident. I reviewed the residents note and agree with the documented findings and plan of care. Any areas of disagreement are noted on the chart. I was personally present for the key portions of any procedures. I have documented in the chart those procedures where I was not present during the key portions. I have reviewed the emergency nurses triage note. I agree with the chief complaint, past medical history, past surgical history, allergies, medications, social and family history as documented unless otherwise noted below. For Physician Assistant/ Nurse Practitioner cases/documentation I have personally evaluated this patient and have completed at least one if not all key elements of the E/M (history, physical exam, and MDM). Additional findings are as noted. Initial Screens:        Calvin Coma Scale  Eye Opening: Spontaneous  Best Verbal Response: Oriented  Best Motor Response: Obeys commands  Vielka Coma Scale Score: 15    Vitals:    Vitals:    10/14/22 1818   BP: (!) 144/94   Pulse: 98   Resp: 22   Temp: 98 °F (36.7 °C)   TempSrc: Oral   SpO2: 99%   Weight: 185 lb (83.9 kg)   Height: 5' 3\" (1.6 m)       CHIEF COMPLAINT       Chief Complaint   Patient presents with    Back Pain     Mainly left sided     The pt is a 50 yo F who presents with worsening L sciatic pain and required admit for sciatica and pain. The pt has been admitted at both [facility] and [facility]. The pt has been seen by neurosurgery. The pt has been DCed on large amounts of narcotic pain medications. No new injuries fevers or chills.   She has increasing L toe numbness surgery planned for Tuesday but pain control is an issue. EMERGENCY DEPARTMENT COURSE:     -------------------------  BP: (!) 144/94, Temp: 98 °F (36.7 °C), Heart Rate: 98, Resp: 22  Physical Exam  Constitutional:       Appearance: She is well-developed. She is not diaphoretic. HENT:      Head: Normocephalic and atraumatic. Right Ear: External ear normal.      Left Ear: External ear normal.   Eyes:      General: No scleral icterus. Right eye: No discharge. Left eye: No discharge. Neck:      Trachea: No tracheal deviation. Pulmonary:      Effort: Pulmonary effort is normal. No respiratory distress. Breath sounds: No stridor. Musculoskeletal:         General: Normal range of motion. Cervical back: Normal range of motion. Skin:     General: Skin is warm and dry. Neurological:      Mental Status: She is alert and oriented to person, place, and time. Coordination: Coordination normal.      Comments: Is moving knees and feet without difficulty no sensation deficit however patient does note a tingling sensation now in all her toes on the left   Psychiatric:         Behavior: Behavior normal.         Comments  Patient will need pain control and admission for neurosurgical evaluation as well as likely anesthesia care for possible nerve blocks    Griselda Soares DO,, DO, RDMS.   Attending Emergency Physician         Griselda Soares DO  10/15/22 1662

## 2022-10-14 NOTE — TELEPHONE ENCOUNTER
Patient is in extreme pain and would like to be seen as soon as possible. Informed patient that she will need to go to the ER if she is in that much pain. Patient was tearful on the phone.

## 2022-10-15 PROBLEM — M54.9 INTRACTABLE BACK PAIN: Status: ACTIVE | Noted: 2022-10-15

## 2022-10-15 PROBLEM — M51.16 LUMBAR DISC DISEASE WITH RADICULOPATHY: Status: ACTIVE | Noted: 2022-10-15

## 2022-10-15 PROCEDURE — 6370000000 HC RX 637 (ALT 250 FOR IP): Performed by: EMERGENCY MEDICINE

## 2022-10-15 PROCEDURE — 96376 TX/PRO/DX INJ SAME DRUG ADON: CPT

## 2022-10-15 PROCEDURE — 99223 1ST HOSP IP/OBS HIGH 75: CPT | Performed by: NEUROLOGICAL SURGERY

## 2022-10-15 PROCEDURE — 6360000002 HC RX W HCPCS: Performed by: STUDENT IN AN ORGANIZED HEALTH CARE EDUCATION/TRAINING PROGRAM

## 2022-10-15 PROCEDURE — 2580000003 HC RX 258: Performed by: STUDENT IN AN ORGANIZED HEALTH CARE EDUCATION/TRAINING PROGRAM

## 2022-10-15 PROCEDURE — 96372 THER/PROPH/DIAG INJ SC/IM: CPT

## 2022-10-15 PROCEDURE — 1200000000 HC SEMI PRIVATE

## 2022-10-15 RX ORDER — METHOCARBAMOL 750 MG/1
750 TABLET, FILM COATED ORAL 4 TIMES DAILY
Status: DISCONTINUED | OUTPATIENT
Start: 2022-10-15 | End: 2022-10-25 | Stop reason: HOSPADM

## 2022-10-15 RX ADMIN — ROPINIROLE HYDROCHLORIDE 2 MG: 1 TABLET, FILM COATED ORAL at 23:05

## 2022-10-15 RX ADMIN — CYCLOBENZAPRINE 10 MG: 10 TABLET, FILM COATED ORAL at 04:05

## 2022-10-15 RX ADMIN — SODIUM CHLORIDE: 9 INJECTION, SOLUTION INTRAVENOUS at 21:34

## 2022-10-15 RX ADMIN — METHOCARBAMOL TABLETS 750 MG: 750 TABLET, COATED ORAL at 18:29

## 2022-10-15 RX ADMIN — METHOCARBAMOL TABLETS 750 MG: 750 TABLET, COATED ORAL at 21:28

## 2022-10-15 RX ADMIN — KETOROLAC TROMETHAMINE 15 MG: 30 INJECTION, SOLUTION INTRAMUSCULAR; INTRAVENOUS at 22:44

## 2022-10-15 RX ADMIN — KETOROLAC TROMETHAMINE 15 MG: 30 INJECTION, SOLUTION INTRAMUSCULAR; INTRAVENOUS at 04:08

## 2022-10-15 RX ADMIN — SODIUM CHLORIDE, PRESERVATIVE FREE 10 ML: 5 INJECTION INTRAVENOUS at 21:32

## 2022-10-15 RX ADMIN — MORPHINE SULFATE 4 MG: 4 INJECTION INTRAVENOUS at 05:01

## 2022-10-15 RX ADMIN — MORPHINE SULFATE 4 MG: 4 INJECTION INTRAVENOUS at 22:44

## 2022-10-15 RX ADMIN — MORPHINE SULFATE 4 MG: 4 INJECTION INTRAVENOUS at 11:33

## 2022-10-15 RX ADMIN — MORPHINE SULFATE 4 MG: 4 INJECTION INTRAVENOUS at 02:54

## 2022-10-15 RX ADMIN — METHOCARBAMOL TABLETS 750 MG: 750 TABLET, COATED ORAL at 14:09

## 2022-10-15 ASSESSMENT — PAIN DESCRIPTION - DESCRIPTORS
DESCRIPTORS: THROBBING
DESCRIPTORS: THROBBING;STABBING
DESCRIPTORS: THROBBING;STABBING

## 2022-10-15 ASSESSMENT — ENCOUNTER SYMPTOMS
SHORTNESS OF BREATH: 0
ABDOMINAL DISTENTION: 0
DIARRHEA: 0
CONSTIPATION: 0
VOMITING: 0
BACK PAIN: 1

## 2022-10-15 ASSESSMENT — PAIN DESCRIPTION - FREQUENCY
FREQUENCY: CONTINUOUS
FREQUENCY: CONTINUOUS

## 2022-10-15 ASSESSMENT — PAIN - FUNCTIONAL ASSESSMENT
PAIN_FUNCTIONAL_ASSESSMENT: ACTIVITIES ARE NOT PREVENTED
PAIN_FUNCTIONAL_ASSESSMENT: PREVENTS OR INTERFERES SOME ACTIVE ACTIVITIES AND ADLS

## 2022-10-15 ASSESSMENT — PAIN DESCRIPTION - ORIENTATION
ORIENTATION: LOWER
ORIENTATION: LEFT;RIGHT;LOWER
ORIENTATION: LEFT
ORIENTATION: LOWER;LEFT
ORIENTATION: LEFT

## 2022-10-15 ASSESSMENT — PAIN SCALES - GENERAL
PAINLEVEL_OUTOF10: 9
PAINLEVEL_OUTOF10: 10
PAINLEVEL_OUTOF10: 0
PAINLEVEL_OUTOF10: 9
PAINLEVEL_OUTOF10: 9
PAINLEVEL_OUTOF10: 10
PAINLEVEL_OUTOF10: 10
PAINLEVEL_OUTOF10: 9
PAINLEVEL_OUTOF10: 0

## 2022-10-15 ASSESSMENT — PAIN DESCRIPTION - ONSET: ONSET: ON-GOING

## 2022-10-15 ASSESSMENT — PAIN DESCRIPTION - LOCATION
LOCATION: BACK;LEG;BUTTOCKS;HIP
LOCATION: BACK;HIP
LOCATION: BACK;BUTTOCKS;HIP
LOCATION: BACK;LEG;HIP;BUTTOCKS
LOCATION: BACK

## 2022-10-15 ASSESSMENT — PAIN DESCRIPTION - PAIN TYPE
TYPE: CHRONIC PAIN
TYPE: CHRONIC PAIN

## 2022-10-15 NOTE — H&P
[address]  CDU / OBSERVATION ENCOUNTER  RESIDENT NOTE     Pt Name: Debi Jordan  MRN: [id number]  [place] 1960  Date of evaluation: 10/15/22  Patient's PCP is : No primary care provider on file. CHIEF COMPLAINT       Chief Complaint   Patient presents with    Back Pain     Mainly left sided         HISTORY OF PRESENT ILLNESS    Debi Jordan is a 65 y.o. female who presents significant headache and left sided sciatic pain. Patient recently stopped taking PO medications for upcoming surgery on Tuesday. No numbness or tingling of vagina or rectum. Recent admission for similar complaints at [facility]. Instructed to come in for pain management by NS. Location/Symptom: back and back of left leg   Timing/Onset: months ago  Provocation: worse with movement, lying flat, sitting, standing   Quality: sharp, throbbing, aching   Radiation: down the back of her left leg   Severity: 10/10  Timing/Duration: constant   Modifying Factors: nothing improves the pain but medications     REVIEW OF SYSTEMS       Review of Systems   Constitutional:  Negative for chills and fever. Respiratory:  Negative for shortness of breath. Cardiovascular:  Negative for chest pain. Gastrointestinal:  Negative for abdominal distention, constipation, diarrhea and vomiting. Genitourinary:  Negative for difficulty urinating and urgency. Musculoskeletal:  Positive for back pain and gait problem. Skin:  Negative for wound. Allergic/Immunologic: Negative for immunocompromised state. Neurological:  Positive for weakness and numbness. (PQRS) Advance directives on face sheet per hospital policy. No change unless specifically mentioned in chart    PAST MEDICAL HISTORY    has no past medical history on file. I have reviewed the past medical history with the patient and it is pertinent to this complaint. SURGICAL HISTORY      has no past surgical history on file.   I have reviewed and agree with Surgical History entered and it is pertinent to this complaint. CURRENT MEDICATIONS     methocarbamol (ROBAXIN) tablet 750 mg, 4x Daily  0.9 % sodium chloride infusion, Continuous  sodium chloride flush 0.9 % injection 5-40 mL, 2 times per day  sodium chloride flush 0.9 % injection 5-40 mL, PRN  0.9 % sodium chloride infusion, PRN  potassium chloride (KLOR-CON M) extended release tablet 40 mEq, PRN   Or  potassium bicarb-citric acid (EFFER-K) effervescent tablet 40 mEq, PRN   Or  potassium chloride 10 mEq/100 mL IVPB (Peripheral Line), PRN  enoxaparin (LOVENOX) injection 40 mg, Daily  ondansetron (ZOFRAN) injection 4 mg, Q4H PRN  polyethylene glycol (GLYCOLAX) packet 17 g, Daily PRN  acetaminophen (TYLENOL) tablet 650 mg, Q6H PRN   Or  acetaminophen (TYLENOL) suppository 650 mg, Q6H PRN  morphine injection 4 mg, Q2H PRN  ketorolac (TORADOL) injection 15 mg, Q6H PRN  morphine injection 4 mg, Once  rOPINIRole (REQUIP) tablet 2 mg, Nightly      All medication charted and reviewed. ALLERGIES     has No Known Allergies. FAMILY HISTORY     has no family status information on file. family history is not on file. The patient denies any pertinent family history. I have reviewed and agree with the family history entered. I have reviewed the Family History and it is not significant to the case    SOCIAL HISTORY        I have reviewed and agree with all Social.  There are no concerns for substance abuse/use. PHYSICAL EXAM     INITIAL VITALS:  height is 5' 3\" (1.6 m) and weight is 185 lb (83.9 kg). Her oral temperature is 98.4 °F (36.9 °C). Her blood pressure is 129/81 and her pulse is 89. Her respiration is 20 and oxygen saturation is 96%. Physical Exam  Constitutional:       General: She is not in acute distress. HENT:      Head: Normocephalic and atraumatic. Mouth/Throat:      Mouth: Mucous membranes are moist.      Pharynx: Oropharynx is clear.    Eyes:      Extraocular Movements: Extraocular movements intact. Conjunctiva/sclera: Conjunctivae normal.   Cardiovascular:      Rate and Rhythm: Normal rate and regular rhythm. Heart sounds: Normal heart sounds. Pulmonary:      Effort: Pulmonary effort is normal.      Breath sounds: Normal breath sounds. Abdominal:      General: There is no distension. Palpations: Abdomen is soft. Tenderness: There is no abdominal tenderness. Skin:     General: Skin is warm. Neurological:      Mental Status: She is alert and oriented to person, place, and time. Sensory: No sensory deficit. Comments: LLE weakness in plantar and dorsi flexion, limited due to pain    Psychiatric:      Comments: tearful           DIFFERENTIAL DIAGNOSIS/MDM:     DDx: Sciatica    DIAGNOSTIC RESULTS       RADIOLOGY:   I directly visualized the following  images and reviewed the radiologist interpretations:    No results found. LABS:  I have reviewed and interpreted all available lab results. Labs Reviewed   CBC WITH AUTO DIFFERENTIAL - Abnormal; Notable for the following components:       Result Value    WBC 13.3 (*)     Lymphocytes 22 (*)     Segs Absolute 8.43 (*)     Absolute Mono # 1.28 (*)     Absolute Eos # 0.50 (*)     All other components within normal limits   BASIC METABOLIC PANEL - Abnormal; Notable for the following components:    Glucose 149 (*)     All other components within normal limits   APTT - Abnormal; Notable for the following components:    PTT 20.2 (*)     All other components within normal limits   COVID-19, RAPID   PROTIME-INR       CDU IMPRESSION / Seth Done is a 65 y.o. female who presents with sciatica back pain      Sciatica back pain   Neurosurgery consulted. Awaiting recommendations  Family medicine consulted as this is a family medicine practice patient and transfer of care would be appropriate.    Continue home medications and pain control  Monitor vitals, labs, and imaging  DISPO: pending consults and clinical improvement    CONSULTS:    IP CONSULT TO NEUROSURGERY  IP CONSULT TO FAMILY MEDICINE    PROCEDURES:  Not indicated       PATIENT REFERRED TO:    No follow-up provider specified. --  Mouna Rogers DO   Emergency Medicine Resident     This dictation was generated by voice recognition computer software. Although all attempts are made to edit the dictation for accuracy, there may be errors in the transcription that are not intended.

## 2022-10-15 NOTE — CONSULTS
[facility]    Department of Neurosurgery  Resident Consult Note      Reason for Consult: Back pain  Requesting Physician: Jose Silveira  Neurosurgeon:   [x]Dr. Eda Nuñez  []Dr. Tauna Cushing  []Dr. Sammy Collins  []Dr. Kaiser Irvin  []Dr. Evelina James  []Dr. Ralph Moulton    History Obtained From:  patient, electronic medical record    CHIEF COMPLAINT:         Low back pain    HISTORY OF PRESENT ILLNESS:       The patient is a 65 y.o. female who presents with left sciatic pain. Patient has had recent administration to [facility] for this. Patient was additionally admitted to [facility] about 2 weeks ago for the same complaint. Patient has been having chronic low back pain for several years, states that her symptoms have been progressively worsening. Patient is currently having severe back pain, she is able to ambulate however she states that her toes are somewhat tingling. This is also not new. Patient is not have any fevers no chills nausea vomiting or other constitutional symptoms. Patient has not had any recent trauma, no recent falls. Patient states that neurosurgery plan neck recommended that this patient be admitted for observation and continued pain control. Patient was admitted to the observation unit. PAST MEDICAL HISTORY :       Past Medical History:    No past medical history on file. Past Surgical History:    No past surgical history on file.     Social History:   Social History     Socioeconomic History    Marital status: Single     Spouse name: Not on file    Number of children: Not on file    Years of education: Not on file    Highest education level: Not on file   Occupational History    Not on file   Tobacco Use    Smoking status: Not on file    Smokeless tobacco: Not on file   Substance and Sexual Activity    Alcohol use: Not on file    Drug use: Not on file    Sexual activity: Not on file   Other Topics Concern    Not on file   Social History Narrative    Not on file Social Determinants of Health     Financial Resource Strain: Not on file   Food Insecurity: Not on file   Transportation Needs: Not on file   Physical Activity: Not on file   Stress: Not on file   Social Connections: Not on file   Intimate Partner Violence: Not on file   Housing Stability: Not on file       Family History:   No family history on file. Allergies:   Patient has no known allergies. Home Medications:  Prior to Admission medications    Medication Sig Start Date End Date Taking? Authorizing Provider   aspirin 81 MG EC tablet Take 81 mg by mouth daily   Yes Historical Provider, MD   busPIRone (BUSPAR) 10 MG tablet Take 10 mg by mouth in the morning and 10 mg in the evening. Yes Historical Provider, MD   celecoxib (CELEBREX) 100 MG capsule Take 100 mg by mouth 2 times daily   Yes Historical Provider, MD   gabapentin (NEURONTIN) 300 MG capsule Take 300 mg by mouth 2 times daily.    Yes Historical Provider, MD   glipiZIDE (GLUCOTROL) 10 MG tablet Take 10 mg by mouth 2 times daily (before meals)   Yes Historical Provider, MD   furosemide (LASIX) 20 MG tablet Take 20 mg by mouth daily   Yes Historical Provider, MD   lisinopril (PRINIVIL;ZESTRIL) 40 MG tablet Take 40 mg by mouth daily   Yes Historical Provider, MD   bimatoprost (LUMIGAN) 0.01 % SOLN ophthalmic drops Place 1 drop into both eyes nightly   Yes Historical Provider, MD   omeprazole (PRILOSEC) 20 MG delayed release capsule Take 20 mg by mouth daily   Yes Historical Provider, MD   rOPINIRole (REQUIP) 2 MG tablet Take 2 mg by mouth nightly   Yes Historical Provider, MD   atorvastatin (LIPITOR) 40 MG tablet Take 40 mg by mouth nightly   Yes Historical Provider, MD   magnesium oxide (MAG-OX) 400 MG tablet Take 400 mg by mouth daily   Yes Historical Provider, MD   sertraline (ZOLOFT) 50 MG tablet Take 50 mg by mouth daily   Yes Historical Provider, MD   Empagliflozin-metFORMIN HCl (SYNJARDY) 5-1000 MG TABS Take 1,000 mg by mouth 2 times daily (before meals)   Yes Historical Provider, MD   Dulaglutide (TRULICITY) 1.5 YB/0.9JO SOPN Inject 1.5 mg into the skin once a week   Yes Historical Provider, MD   albuterol sulfate HFA (VENTOLIN HFA) 108 (90 Base) MCG/ACT inhaler Inhale 2 puffs into the lungs every 6 hours as needed for Wheezing   Yes Historical Provider, MD   diclofenac sodium (VOLTAREN) 1 % GEL Apply 2 g topically as needed for Pain   Yes Historical Provider, MD   docusate sodium (COLACE) 100 MG capsule Take 100 mg by mouth 2 times daily as needed for Constipation   Yes Historical Provider, MD   cyclobenzaprine (FLEXERIL) 10 MG tablet Take 10 mg by mouth 3 times daily as needed for Muscle spasms   Yes Historical Provider, MD   oxyCODONE (ROXICODONE) 5 MG immediate release tablet Take 5 mg by mouth every 4 hours as needed for Pain. Yes Historical Provider, MD   diazePAM (VALIUM) 2 MG tablet Take 2 mg by mouth every 6 hours as needed for Anxiety.    Yes Historical Provider, MD       Current Medications:   Current Facility-Administered Medications: 0.9 % sodium chloride infusion, , IntraVENous, Continuous  sodium chloride flush 0.9 % injection 5-40 mL, 5-40 mL, IntraVENous, 2 times per day  sodium chloride flush 0.9 % injection 5-40 mL, 5-40 mL, IntraVENous, PRN  0.9 % sodium chloride infusion, 25 mL, IntraVENous, PRN  potassium chloride (KLOR-CON M) extended release tablet 40 mEq, 40 mEq, Oral, PRN **OR** potassium bicarb-citric acid (EFFER-K) effervescent tablet 40 mEq, 40 mEq, Oral, PRN **OR** potassium chloride 10 mEq/100 mL IVPB (Peripheral Line), 10 mEq, IntraVENous, PRN  enoxaparin (LOVENOX) injection 40 mg, 40 mg, SubCUTAneous, Daily  ondansetron (ZOFRAN) injection 4 mg, 4 mg, IntraVENous, Q4H PRN  polyethylene glycol (GLYCOLAX) packet 17 g, 17 g, Oral, Daily PRN  acetaminophen (TYLENOL) tablet 650 mg, 650 mg, Oral, Q6H PRN **OR** acetaminophen (TYLENOL) suppository 650 mg, 650 mg, Rectal, Q6H PRN  morphine injection 4 mg, 4 mg, IntraVENous, Q2H PRN  ketorolac (TORADOL) injection 15 mg, 15 mg, IntraVENous, Q6H PRN  morphine injection 4 mg, 4 mg, IntraVENous, Once  cyclobenzaprine (FLEXERIL) tablet 10 mg, 10 mg, Oral, TID PRN  rOPINIRole (REQUIP) tablet 2 mg, 2 mg, Oral, Nightly    REVIEW OF SYSTEMS:       General ROS - No fevers, no chills  Ophthalmic ROS - No changes in vision from baseline  ENT ROS -  No sore throat, no rhinorrhea  Respiratory ROS - no cough, no shortness of breath  Cardiovascular ROS - No chest pain  Gastrointestinal ROS - No abdominal pain, no nausea, no vomiting  Genito-Urinary ROS - No dysuria  Musculoskeletal ROS -low back, sciatic pain neurological ROS - No focal weakness or loss of sensation, no headache  Dermatological ROS - No lesions, no rash  Vascular/Lymphatic ROS - No edema    PHYSICAL EXAM:       Vitals:    10/14/22 2133   BP: 129/81   Pulse: 89   Resp: 20   Temp: 98.4 °F (36.9 °C)   SpO2: 96%       CONSTITUTIONAL:  Well developed, well nourished, alert and oriented x 3, in no acute distress, nontoxic. No dysarthria, no aphasia. EOMI.     HEAD:  normocephalic, atraumatic    EYES:  PERRLA, EOMI   ENT:  moist mucous membranes, no stridor, no tracheal deviation   NECK:  no midline tenderness to palpation, no step-offs or deformities   BACK:  no midline tenderness to palpation, no step-offs or deformities    LUNGS:  No resp distress   CARDIOVASCULAR:  Regular rate   ABDOMEN:  Soft, no rigidity   NEUROLOGIC:  GCS 15    Motor/sensory:   Range of motion bilateral upper extremities normal  Range of motion bilateral lower extremities reduced, limited by pain  Sensation in feet intact, patient is able to wiggle toes to command  Patient is able to ambulate with a cane  Tenderness to palpation on left sciatic area  Bilateral DP pulses intact, brisk  No lower extremity swelling     SKIN:  no rash      LABS AND IMAGING:     Labs:  CBC with Differential:    Lab Results   Component Value Date/Time    WBC 13.3 10/14/2022 07:54 PM    RBC 4.91 10/14/2022 07:54 PM    HGB 13.5 10/14/2022 07:54 PM    HCT 42.1 10/14/2022 07:54 PM     10/14/2022 07:54 PM    MCV 85.7 10/14/2022 07:54 PM    MCH 27.5 10/14/2022 07:54 PM    MCHC 32.1 10/14/2022 07:54 PM    RDW 14.2 10/14/2022 07:54 PM    LYMPHOPCT 22 10/14/2022 07:54 PM    MONOPCT 10 10/14/2022 07:54 PM    BASOPCT 1 10/14/2022 07:54 PM    MONOSABS 1.28 10/14/2022 07:54 PM    LYMPHSABS 2.94 10/14/2022 07:54 PM    EOSABS 0.50 10/14/2022 07:54 PM    BASOSABS 0.06 10/14/2022 07:54 PM     BMP:    Lab Results   Component Value Date/Time     10/14/2022 07:54 PM    K 4.5 10/14/2022 07:54 PM     10/14/2022 07:54 PM    CO2 25 10/14/2022 07:54 PM    BUN 15 10/14/2022 07:54 PM    CREATININE 0.83 10/14/2022 07:54 PM    CALCIUM 9.3 10/14/2022 07:54 PM    LABGLOM >60 10/14/2022 07:54 PM    GLUCOSE 149 10/14/2022 07:54 PM       Radiology Review: Imaging will be reviewed pending chart merge      ASSESSMENT AND PLAN:       Patient Active Problem List   Diagnosis    Sciatica       A/P:  Swathi Urrutia is a 65 y.o. female who presents with sciatic nerve pain, acute on chronic. Patient was seen at neurosurgery clinic and was sent to the ER, recommended admission for evaluation by neurosurgery team and pain control. Patient is hemodynamically stable, no focal deficits on exam.  Patient was registered in epic incorrectly, pending chart merge for image review. Patient had recent imaging completed already about 2 weeks ago when she was admitted for same complaint. Patient's symptoms today are not markedly different from the symptoms she has been experiencing over the past several weeks to months. No red flag symptoms on evaluation.       Patient care will be discussed with attending, will reevaluate patient along with attending     - No neurosurgical interventions planned for now  -We will consider obtaining additional imaging in the morning   - Neuro checks per protocol  - Ok to begin prophylactic anticoagulation from neurosurgery stand point. However, we recommend careful evaluation of all other risk factors associated with anticoagulation therapy as applied to this patient's medical condition  - We recommend SBP < 140  -Continue home medications      Additional recommendations may follow    Please contact neurosurgery with any changes in patient's neurologic status. Thank you for your consult.        Efrem Arriaga DO    PGY-2 Emergency Medicine Resident Physician  Neurosurgery Team - pager [phone]  10/15/2022 4:39 AM

## 2022-10-15 NOTE — ED NOTES
Patient arrives today with complaints of severe back/hip pain related to sciatic nerve pain. Patient states that her pain is effecting her ADLs. Patient denies any trauma to area. Patient is alert and oriented x4 and ambulates with a cane.      Sarahi Muniz RN  10/14/22 2041

## 2022-10-15 NOTE — CARE COORDINATION
10/15/22 1121   Service Assessment   Patient Orientation Alert and Oriented   Cognition Alert   History Provided By Patient   Primary Caregiver Self   Support Systems Spouse/Significant Other   PCP Verified by CM Yes  (follows with Estela Tellez, last seen august)   Last Visit to PCP Within last 3 months   Prior Functional Level Independent in ADLs/IADLs   Current Functional Level Independent in ADLs/IADLs   Can patient return to prior living arrangement Yes   Ability to make needs known: Good   Family able to assist with home care needs: Yes   Would you like for me to discuss the discharge plan with any other family members/significant others, and if so, who? No   Social/Functional History   Lives With Spouse   Type of [address] One level  (lives on second floor apt)   [facility] [address] to enter with rails   Entrance Stairs - Number of Steps flight   Entrance Stairs - Rails Right   Bathroom Shower/Tub Tub/Shower unit   Bathroom Toilet Standard   Bathroom Accessibility Not accessible   Home Equipment Cane;Walker, rolling  (glucometer (needs lancets and strips))   Receives Help From Family   ADL Assistance Independent   Homemaking Responsibilities Yes   Ambulation Assistance Independent   Transfer Assistance Independent   Active  No   Patient's  Info last drove in may   Mode of Transportation SUV   Occupation Unemployed   Discharge Planning   Type of [address] Prior To Admission None   Potential Assistance Needed N/A   DME Ordered? No   Potential Assistance Purchasing Medications No  (fills meds mail order or at [address] [place], [place] [address])   Patient expects to be discharged to: Apartment   One/Two Story Residence One story   History of falls?  0   Services At/After Discharge   Services At/After Discharge None   Mode of Transport at Discharge   (family to provide transportation home)   Confirm Follow Up Transport Family Condition of Participation: Discharge Planning   The Plan for Transition of Care is related to the following treatment goals: comfort

## 2022-10-15 NOTE — ED NOTES
Report called to [facility] RN. All questions answered.      Eric Liriano RN  10/14/22 2057

## 2022-10-16 LAB
GLUCOSE BLD-MCNC: 112 MG/DL (ref 65–105)
GLUCOSE BLD-MCNC: 128 MG/DL (ref 65–105)
GLUCOSE BLD-MCNC: 153 MG/DL (ref 65–105)
GLUCOSE BLD-MCNC: 162 MG/DL (ref 65–105)
GLUCOSE BLD-MCNC: 204 MG/DL (ref 65–105)
GLUCOSE BLD-MCNC: 69 MG/DL (ref 65–105)
GLUCOSE BLD-MCNC: 86 MG/DL (ref 65–105)

## 2022-10-16 PROCEDURE — 6370000000 HC RX 637 (ALT 250 FOR IP): Performed by: EMERGENCY MEDICINE

## 2022-10-16 PROCEDURE — 2580000003 HC RX 258: Performed by: STUDENT IN AN ORGANIZED HEALTH CARE EDUCATION/TRAINING PROGRAM

## 2022-10-16 PROCEDURE — 99233 SBSQ HOSP IP/OBS HIGH 50: CPT | Performed by: NEUROLOGICAL SURGERY

## 2022-10-16 PROCEDURE — 6360000002 HC RX W HCPCS: Performed by: STUDENT IN AN ORGANIZED HEALTH CARE EDUCATION/TRAINING PROGRAM

## 2022-10-16 PROCEDURE — 2580000003 HC RX 258: Performed by: NEUROLOGICAL SURGERY

## 2022-10-16 PROCEDURE — 1200000000 HC SEMI PRIVATE

## 2022-10-16 PROCEDURE — 6360000002 HC RX W HCPCS: Performed by: NEUROLOGICAL SURGERY

## 2022-10-16 PROCEDURE — 6370000000 HC RX 637 (ALT 250 FOR IP): Performed by: STUDENT IN AN ORGANIZED HEALTH CARE EDUCATION/TRAINING PROGRAM

## 2022-10-16 PROCEDURE — 6360000002 HC RX W HCPCS: Performed by: EMERGENCY MEDICINE

## 2022-10-16 PROCEDURE — 82947 ASSAY GLUCOSE BLOOD QUANT: CPT

## 2022-10-16 RX ORDER — CYCLOBENZAPRINE HCL 10 MG
10 TABLET ORAL 3 TIMES DAILY PRN
Status: DISCONTINUED | OUTPATIENT
Start: 2022-10-16 | End: 2022-10-19

## 2022-10-16 RX ORDER — DIAZEPAM 2 MG/1
2 TABLET ORAL EVERY 6 HOURS PRN
Status: DISCONTINUED | OUTPATIENT
Start: 2022-10-16 | End: 2022-10-25 | Stop reason: HOSPADM

## 2022-10-16 RX ORDER — GABAPENTIN 300 MG/1
300 CAPSULE ORAL 2 TIMES DAILY
Status: DISCONTINUED | OUTPATIENT
Start: 2022-10-16 | End: 2022-10-25 | Stop reason: HOSPADM

## 2022-10-16 RX ORDER — MORPHINE SULFATE 4 MG/ML
4 INJECTION, SOLUTION INTRAMUSCULAR; INTRAVENOUS
Status: COMPLETED | OUTPATIENT
Start: 2022-10-16 | End: 2022-10-18

## 2022-10-16 RX ORDER — SODIUM CHLORIDE 9 MG/ML
INJECTION, SOLUTION INTRAVENOUS CONTINUOUS
Status: DISCONTINUED | OUTPATIENT
Start: 2022-10-16 | End: 2022-10-18

## 2022-10-16 RX ORDER — BUSPIRONE HYDROCHLORIDE 10 MG/1
10 TABLET ORAL 2 TIMES DAILY
Status: DISCONTINUED | OUTPATIENT
Start: 2022-10-16 | End: 2022-10-25 | Stop reason: HOSPADM

## 2022-10-16 RX ORDER — ASPIRIN 81 MG/1
81 TABLET ORAL DAILY
Status: DISCONTINUED | OUTPATIENT
Start: 2022-10-16 | End: 2022-10-25 | Stop reason: HOSPADM

## 2022-10-16 RX ORDER — ROPINIROLE 1 MG/1
2 TABLET, FILM COATED ORAL NIGHTLY
Status: DISCONTINUED | OUTPATIENT
Start: 2022-10-16 | End: 2022-10-25 | Stop reason: HOSPADM

## 2022-10-16 RX ORDER — CELECOXIB 100 MG/1
100 CAPSULE ORAL 2 TIMES DAILY
Status: DISCONTINUED | OUTPATIENT
Start: 2022-10-16 | End: 2022-10-25

## 2022-10-16 RX ORDER — GLIPIZIDE 10 MG/1
10 TABLET ORAL
Status: DISCONTINUED | OUTPATIENT
Start: 2022-10-16 | End: 2022-10-25 | Stop reason: HOSPADM

## 2022-10-16 RX ORDER — DEXAMETHASONE 4 MG/1
4 TABLET ORAL EVERY 6 HOURS SCHEDULED
Status: DISCONTINUED | OUTPATIENT
Start: 2022-10-16 | End: 2022-10-17

## 2022-10-16 RX ORDER — FUROSEMIDE 20 MG/1
20 TABLET ORAL DAILY
Status: DISCONTINUED | OUTPATIENT
Start: 2022-10-16 | End: 2022-10-25 | Stop reason: HOSPADM

## 2022-10-16 RX ORDER — ALBUTEROL SULFATE 90 UG/1
2 AEROSOL, METERED RESPIRATORY (INHALATION) EVERY 6 HOURS PRN
Status: DISCONTINUED | OUTPATIENT
Start: 2022-10-16 | End: 2022-10-25 | Stop reason: HOSPADM

## 2022-10-16 RX ORDER — PANTOPRAZOLE SODIUM 40 MG/1
40 TABLET, DELAYED RELEASE ORAL DAILY
Status: DISCONTINUED | OUTPATIENT
Start: 2022-10-16 | End: 2022-10-25 | Stop reason: HOSPADM

## 2022-10-16 RX ORDER — LISINOPRIL 20 MG/1
40 TABLET ORAL DAILY
Status: DISCONTINUED | OUTPATIENT
Start: 2022-10-16 | End: 2022-10-25 | Stop reason: HOSPADM

## 2022-10-16 RX ORDER — ATORVASTATIN CALCIUM 40 MG/1
40 TABLET, FILM COATED ORAL NIGHTLY
Status: DISCONTINUED | OUTPATIENT
Start: 2022-10-16 | End: 2022-10-25 | Stop reason: HOSPADM

## 2022-10-16 RX ADMIN — ROPINIROLE HYDROCHLORIDE 2 MG: 1 TABLET, FILM COATED ORAL at 19:54

## 2022-10-16 RX ADMIN — KETOROLAC TROMETHAMINE 15 MG: 30 INJECTION, SOLUTION INTRAMUSCULAR; INTRAVENOUS at 07:33

## 2022-10-16 RX ADMIN — DEXAMETHASONE 4 MG: 4 TABLET ORAL at 23:12

## 2022-10-16 RX ADMIN — BUSPIRONE HYDROCHLORIDE 10 MG: 10 TABLET ORAL at 09:44

## 2022-10-16 RX ADMIN — SERTRALINE 50 MG: 50 TABLET, FILM COATED ORAL at 09:44

## 2022-10-16 RX ADMIN — GABAPENTIN 300 MG: 300 CAPSULE ORAL at 09:45

## 2022-10-16 RX ADMIN — DESMOPRESSIN ACETATE 40 MG: 0.2 TABLET ORAL at 19:54

## 2022-10-16 RX ADMIN — METHOCARBAMOL TABLETS 750 MG: 750 TABLET, COATED ORAL at 21:34

## 2022-10-16 RX ADMIN — METFORMIN HYDROCHLORIDE 1000 MG: 500 TABLET ORAL at 09:44

## 2022-10-16 RX ADMIN — METHOCARBAMOL TABLETS 750 MG: 750 TABLET, COATED ORAL at 07:31

## 2022-10-16 RX ADMIN — CELECOXIB 100 MG: 100 CAPSULE ORAL at 19:48

## 2022-10-16 RX ADMIN — ACETAMINOPHEN 650 MG: 325 TABLET ORAL at 11:07

## 2022-10-16 RX ADMIN — GLIPIZIDE 10 MG: 10 TABLET ORAL at 09:43

## 2022-10-16 RX ADMIN — BUSPIRONE HYDROCHLORIDE 10 MG: 10 TABLET ORAL at 15:39

## 2022-10-16 RX ADMIN — GABAPENTIN 300 MG: 300 CAPSULE ORAL at 21:34

## 2022-10-16 RX ADMIN — GABAPENTIN 300 MG: 300 CAPSULE ORAL at 04:13

## 2022-10-16 RX ADMIN — SODIUM CHLORIDE, PRESERVATIVE FREE 10 ML: 5 INJECTION INTRAVENOUS at 19:54

## 2022-10-16 RX ADMIN — SODIUM CHLORIDE: 9 INJECTION, SOLUTION INTRAVENOUS at 23:19

## 2022-10-16 RX ADMIN — METHOCARBAMOL TABLETS 750 MG: 750 TABLET, COATED ORAL at 15:38

## 2022-10-16 RX ADMIN — MORPHINE SULFATE 4 MG: 4 INJECTION INTRAVENOUS at 23:56

## 2022-10-16 RX ADMIN — PANTOPRAZOLE SODIUM 40 MG: 40 TABLET, DELAYED RELEASE ORAL at 09:43

## 2022-10-16 RX ADMIN — SODIUM CHLORIDE, PRESERVATIVE FREE 10 ML: 5 INJECTION INTRAVENOUS at 09:47

## 2022-10-16 RX ADMIN — MORPHINE SULFATE 4 MG: 4 INJECTION INTRAVENOUS at 19:34

## 2022-10-16 RX ADMIN — DESMOPRESSIN ACETATE 40 MG: 0.2 TABLET ORAL at 04:13

## 2022-10-16 RX ADMIN — FUROSEMIDE 20 MG: 20 TABLET ORAL at 09:44

## 2022-10-16 RX ADMIN — MORPHINE SULFATE 4 MG: 4 INJECTION INTRAVENOUS at 11:07

## 2022-10-16 ASSESSMENT — PAIN DESCRIPTION - DESCRIPTORS
DESCRIPTORS: ACHING;CRAMPING;DISCOMFORT
DESCRIPTORS: ACHING;DISCOMFORT;CRAMPING
DESCRIPTORS: ACHING;SHARP
DESCRIPTORS: SHARP;ACHING
DESCRIPTORS: ACHING;CRAMPING;DISCOMFORT

## 2022-10-16 ASSESSMENT — PAIN - FUNCTIONAL ASSESSMENT
PAIN_FUNCTIONAL_ASSESSMENT: ACTIVITIES ARE NOT PREVENTED
PAIN_FUNCTIONAL_ASSESSMENT: PREVENTS OR INTERFERES SOME ACTIVE ACTIVITIES AND ADLS
PAIN_FUNCTIONAL_ASSESSMENT: ACTIVITIES ARE NOT PREVENTED
PAIN_FUNCTIONAL_ASSESSMENT: ACTIVITIES ARE NOT PREVENTED
PAIN_FUNCTIONAL_ASSESSMENT: PREVENTS OR INTERFERES SOME ACTIVE ACTIVITIES AND ADLS

## 2022-10-16 ASSESSMENT — PAIN SCALES - GENERAL
PAINLEVEL_OUTOF10: 9
PAINLEVEL_OUTOF10: 5
PAINLEVEL_OUTOF10: 8
PAINLEVEL_OUTOF10: 7
PAINLEVEL_OUTOF10: 9
PAINLEVEL_OUTOF10: 9
PAINLEVEL_OUTOF10: 8

## 2022-10-16 ASSESSMENT — PAIN DESCRIPTION - FREQUENCY
FREQUENCY: INTERMITTENT
FREQUENCY: CONTINUOUS

## 2022-10-16 ASSESSMENT — PAIN DESCRIPTION - ORIENTATION
ORIENTATION: LEFT

## 2022-10-16 ASSESSMENT — PAIN DESCRIPTION - LOCATION
LOCATION: BACK
LOCATION: BACK;BUTTOCKS
LOCATION: BACK;BUTTOCKS;LEG
LOCATION: BACK;LEG;BUTTOCKS;HIP
LOCATION: BACK;BUTTOCKS

## 2022-10-16 ASSESSMENT — PAIN DESCRIPTION - PAIN TYPE
TYPE: CHRONIC PAIN;ACUTE PAIN
TYPE: CHRONIC PAIN

## 2022-10-16 ASSESSMENT — PAIN DESCRIPTION - ONSET: ONSET: ON-GOING

## 2022-10-16 NOTE — PROGRESS NOTES
Report called to 1 A spoke to nurse, rn taking patient from room 348-2 to 108-1. All questions answered.  Patient to be transferred to [facility]

## 2022-10-16 NOTE — PROGRESS NOTES
[address]  CDU / OBSERVATION ENCOUNTER  ATTENDING NOTE       I performed a history and physical examination of the patient and discussed management with the resident or midlevel provider. I reviewed the resident or midlevel provider's note and agree with the documented findings and plan of care. Any areas of disagreement are noted on the chart. I was personally present for the key portions of any procedures. I have documented in the chart those procedures where I was not present during the key portions. I have reviewed the nurses notes. I agree with the chief complaint, past medical history, past surgical history, allergies, medications, social and family history as documented unless otherwise noted below. The Family history, social history, and ROS are effectively unchanged since admission unless noted elsewhere in the chart. Difficulty with pain control necessitating neurosurgical reevaluation. Patient with outpatient treatment failure. Patient with recent neurosurgical procedure. Patient with procedure planned for Monday but may need to alter treatment plan given current symptoms. Awaiting neurosurgical recommendations. We will treat patient's pain in the meantime. Aggressive supportive therapy.     Warner Landau MD  Attending Emergency  Physician

## 2022-10-16 NOTE — PROGRESS NOTES
[address]  CDU / OBSERVATION ENCOUNTER  ATTENDING NOTE       I performed a history and physical examination of the patient and discussed management with the resident or midlevel provider. I reviewed the resident or midlevel provider's note and agree with the documented findings and plan of care. Any areas of disagreement are noted on the chart. I was personally present for the key portions of any procedures. I have documented in the chart those procedures where I was not present during the key portions. I have reviewed the nurses notes. I agree with the chief complaint, past medical history, past surgical history, allergies, medications, social and family history as documented unless otherwise noted below. The Family history, social history, and ROS are effectively unchanged since admission unless noted elsewhere in the chart. Patient with complaints of difficulty with pain control. Patient has failed outpatient therapy. Patient has had surgical intervention and recent admission with return after not doing well at home. Patient with multiple parenteral analgesics. Patient requiring ongoing intensive therapy and with intractable pain. Patient requiring inpatient status due to intensity of therapy, failed outpatient management, and need for probable surgical intervention.     Tonya Caba MD  Attending Emergency  Physician

## 2022-10-16 NOTE — PROGRESS NOTES
OBS/CDU   RESIDENT NOTE      Patients PCP is: No primary care provider on file. SUBJECTIVE      No acute events overnight. Has been able to tolerate a full diet without nausea or vomiting. The patient is urinating on his own and is passing flatus. Denies fever, chills, nausea, vomiting, chest pain, shortness of breath, abdominal pain, focal weakness, numbness, tingling, urinary/bowel symptoms, vision changes, visual hallucinations, or headache. Patient regarding her pain control regimen on outpatient. Patient stated that it helped decreased amount of pain she was experiencing. Recently had to stop the pain medication due to an upcoming neurosurgery procedure. Patient in discussion with neurosurgery regarding proceeding with ulnar release procedure versus spinal procedure. PHYSICAL EXAM      General: NAD, AO X 3, moderately discomfortable  Heent: EMOI, PERRL  Neck: SUPPLE, NO JVD  Cardiovascular: RRR, S1S2  Pulmonary: CTAB, NO SOB  Abdomen: SOFT, NTTP, ND, +BS  Extremities: +2/4 PULSES DISTAL, NO SWELLING  Neuro / Psych: NO NUMBNESS OR TINGLING, MENTATION AT BASELINE    PERTINENT TEST /EXAMS      I have reviewed all available laboratory results.     MEDICATIONS CURRENT   albuterol sulfate HFA (PROVENTIL;VENTOLIN;PROAIR) 108 (90 Base) MCG/ACT inhaler 2 puff, Q6H PRN  aspirin EC tablet 81 mg, Daily  atorvastatin (LIPITOR) tablet 40 mg, Nightly  busPIRone (BUSPAR) tablet 10 mg, BID  celecoxib (CELEBREX) capsule 100 mg, BID  cyclobenzaprine (FLEXERIL) tablet 10 mg, TID PRN  diazePAM (VALIUM) tablet 2 mg, Q6H PRN  [START ON 10/20/2022] Dulaglutide SOPN 1.5 mg, Weekly  furosemide (LASIX) tablet 20 mg, Daily  gabapentin (NEURONTIN) capsule 300 mg, BID  glipiZIDE (GLUCOTROL) tablet 10 mg, BID AC  lisinopril (PRINIVIL;ZESTRIL) tablet 40 mg, Daily  pantoprazole (PROTONIX) tablet 40 mg, Daily  rOPINIRole (REQUIP) tablet 2 mg, Nightly  sertraline (ZOLOFT) tablet 50 mg, Daily  metFORMIN (GLUCOPHAGE) tablet 1,000 mg, BID WC  methocarbamol (ROBAXIN) tablet 750 mg, 4x Daily  0.9 % sodium chloride infusion, Continuous  sodium chloride flush 0.9 % injection 5-40 mL, 2 times per day  sodium chloride flush 0.9 % injection 5-40 mL, PRN  0.9 % sodium chloride infusion, PRN  potassium chloride (KLOR-CON M) extended release tablet 40 mEq, PRN   Or  potassium bicarb-citric acid (EFFER-K) effervescent tablet 40 mEq, PRN   Or  potassium chloride 10 mEq/100 mL IVPB (Peripheral Line), PRN  enoxaparin (LOVENOX) injection 40 mg, Daily  ondansetron (ZOFRAN) injection 4 mg, Q4H PRN  polyethylene glycol (GLYCOLAX) packet 17 g, Daily PRN  acetaminophen (TYLENOL) tablet 650 mg, Q6H PRN   Or  acetaminophen (TYLENOL) suppository 650 mg, Q6H PRN  morphine injection 4 mg, Q2H PRN  ketorolac (TORADOL) injection 15 mg, Q6H PRN  morphine injection 4 mg, Once        All medication charted and reviewed. CONSULTS      IP CONSULT TO NEUROSURGERY  IP CONSULT TO FAMILY MEDICINE    ASSESSMENT/PLAN       Jenn Andrew is a 65 y.o. female who presents with back pain     Sciatica back pain   Neurosurgery consulted. Awaiting recommendations and determination on what procedure patient will undergo at this time. Family medicine consulted as this is a family medicine practice patient and transfer of care would be appropriate. Continue home medications and pain control  Monitor vitals, labs, and imaging  DISPO: pending consults and clinical improvement    --  Laura Wyman DO  Emergency Medicine Resident Physician     This dictation was generated by voice recognition computer software. Although all attempts are made to edit the dictation for accuracy, there may be errors in the transcription that are not intended.

## 2022-10-16 NOTE — PROGRESS NOTES
SPIRITUAL CARE DEPARTMENT - [facility] [address]  PROGRESS NOTE    Shift date: 10/16/2022  Shift day: Sunday   Shift # 2    Room # 0108/0108-01   Name: Gil Jackson                Yazidism: Unknown   Place of Mandaeism: Unknown    Referral: Routine Visit    Admit Date & Time: 10/14/2022  5:41 PM    Assessment:  Gil Jackson is a 65 y.o. female in the hospital because sciatica pain. Upon entering the room writer observes patient is laying in bed. Food tray is sitting on table beside bed, nothing has been eaten yet. Intervention:  Writer introduced self and title as  Writer offered space for patient  to express feelings, needs, and concerns and provided a ministry presence. Patient describes the last couple of months of being in and out of hospitals and medical facilities. Patient describes the need for back surgery and surgery on her arm. She is hoping to have surgery on her back first so that she can use her arm in recovery and for movement. She is also hoping to have everything completed before the end of the year when her insurance runs out. She is concerned because her Neurologist is going to be out of town for several weeks between now and the end of the year. She is glad for her zion in the [facility], but admits it is easy to say it, but another to actually live it.  prayed with patient. Outcome:  Patient was glad for visit and prayer. Patient mentions that you can never have enough prayer. Patient is hopeful to hear the directions the doctors will make tomorrow. Patient is hopeful that surgery will address the pain she is experiencing. Plan:  Chaplains will remain available to offer spiritual and emotional support as needed.       Electronically signed by Francisca Ruiz, on 10/16/2022 at 5:41 PM.  [facility]  [phone]           10/16/22 7767   Encounter Summary   Encounter Overview/Reason  Initial Encounter Service Provided For: Patient   Referral/Consult From: [facility] Spouse;Friends/neighbors   Last Encounter  10/16/22   Complexity of Encounter Low   Begin Time 1710   End Time  1725   Total Time Calculated 15 min   Encounter    Type Initial Screen/Assessment   Assessment/Intervention/Outcome   Assessment Calm; Concerns with suffering;Decisional conflict; Hopeful   Intervention Active listening;Discussed illness injury and its impact; Explored/Affirmed feelings, thoughts, concerns;Nurtured Hope;Prayer (assurance of)/[place];Sustaining Presence/Ministry of presence   Outcome Connection/Belonging;Coping;Encouraged;Engaged in conversation;Expressed feelings, needs, and concerns

## 2022-10-16 NOTE — PROGRESS NOTES
[facility]    Neurosurgery Service  Resident Daily Progress Note   10/16/2022 9:07 AM     Subjective    No acute events overnight. No new complaints. Continues to complain of back pain radiating into the left leg and numbness in the fourth and fifth digit of the left hand. Objective    Vitals:    10/15/22 2244 10/16/22 0022 10/16/22 0422 10/16/22 0755   BP:  126/77 122/80 127/77   Pulse:  85 78 70   Resp: 16 13 13 21   Temp:  98 °F (36.7 °C) 97.7 °F (36.5 °C) 97.6 °F (36.4 °C)   TempSrc:  Oral Oral Temporal   SpO2:   91% 96%   Weight:       Height:           Physical Exam:  Gen: Resting comfortably, no acute distress  CV: RRR  Resp: CTAB  GI: Abdomen soft, non-tender  Skin: Cap refill< 2 seconds  Neuro:    A&Ox3   PERRL, EOMI   CNII-XII intact   Normal speech and mentation  Motor exam intact except for 4/5 strength in the left lower extremity  Sensory exam intact except for numbness in the fourth and fifth digit of the left hand. Labs:  Lab Results   Component Value Date    WBC 13.3 (H) 10/14/2022    HGB 13.5 10/14/2022    HCT 42.1 10/14/2022     10/14/2022     10/14/2022    K 4.5 10/14/2022     10/14/2022    CREATININE 0.83 10/14/2022    BUN 15 10/14/2022    CO2 25 10/14/2022    INR 0.9 10/14/2022       Radiology (last 24 hours): No new studies    ASSESSMENT & PLAN:    Kat Gatica is a 65 y.o. female who presents with sciatic nerve pain, acute on chronic. Patient was seen at neurosurgery clinic and was sent to the ER, recommended admission for evaluation by neurosurgery team and pain control. MRI spine from previous admission reviewed. Likely plan for surgical intervention this admission.     Labs and imaging were reviewed with Dr. Marcelina Ndiaye     - Patient will require surgical intervention.   - CTLS recommendations: None  - HOB: 30 degrees  - Hold all antiplatelets and anticoagulants  - We recommend SBP < 160   - Determine the lower limit of SBP clinically based on mentation  - Neuro checks per floor protocol  - Additional recommendations may follow      Please contact neurosurgery with any changes in patient's neurologic status.       Adalgisa Hernandez MD  PGY-3 neurology resident Physician  Neurosurgery/Neuro Critical Care Team  Pager [phone]

## 2022-10-17 ENCOUNTER — APPOINTMENT (OUTPATIENT)
Dept: MRI IMAGING | Age: 62
DRG: 552 | End: 2022-10-17
Payer: COMMERCIAL

## 2022-10-17 LAB
GLUCOSE BLD-MCNC: 229 MG/DL (ref 65–105)
GLUCOSE BLD-MCNC: 248 MG/DL (ref 65–105)

## 2022-10-17 PROCEDURE — 6370000000 HC RX 637 (ALT 250 FOR IP): Performed by: EMERGENCY MEDICINE

## 2022-10-17 PROCEDURE — 6360000004 HC RX CONTRAST MEDICATION: Performed by: NEUROLOGICAL SURGERY

## 2022-10-17 PROCEDURE — 6370000000 HC RX 637 (ALT 250 FOR IP): Performed by: STUDENT IN AN ORGANIZED HEALTH CARE EDUCATION/TRAINING PROGRAM

## 2022-10-17 PROCEDURE — 6360000002 HC RX W HCPCS: Performed by: EMERGENCY MEDICINE

## 2022-10-17 PROCEDURE — 6360000002 HC RX W HCPCS

## 2022-10-17 PROCEDURE — 1200000000 HC SEMI PRIVATE

## 2022-10-17 PROCEDURE — A9576 INJ PROHANCE MULTIPACK: HCPCS | Performed by: NEUROLOGICAL SURGERY

## 2022-10-17 PROCEDURE — 6360000002 HC RX W HCPCS: Performed by: NEUROLOGICAL SURGERY

## 2022-10-17 PROCEDURE — 2580000003 HC RX 258: Performed by: STUDENT IN AN ORGANIZED HEALTH CARE EDUCATION/TRAINING PROGRAM

## 2022-10-17 PROCEDURE — 2580000003 HC RX 258: Performed by: NEUROLOGICAL SURGERY

## 2022-10-17 PROCEDURE — 72197 MRI PELVIS W/O & W/DYE: CPT

## 2022-10-17 PROCEDURE — 6360000002 HC RX W HCPCS: Performed by: REGISTERED NURSE

## 2022-10-17 PROCEDURE — 99232 SBSQ HOSP IP/OBS MODERATE 35: CPT | Performed by: NEUROLOGICAL SURGERY

## 2022-10-17 PROCEDURE — 82947 ASSAY GLUCOSE BLOOD QUANT: CPT

## 2022-10-17 RX ORDER — DEXAMETHASONE 2 MG/1
2 TABLET ORAL DAILY
Status: DISCONTINUED | OUTPATIENT
Start: 2022-10-26 | End: 2022-10-25 | Stop reason: HOSPADM

## 2022-10-17 RX ORDER — DEXAMETHASONE 4 MG/1
4 TABLET ORAL EVERY 8 HOURS SCHEDULED
Status: COMPLETED | OUTPATIENT
Start: 2022-10-19 | End: 2022-10-21

## 2022-10-17 RX ORDER — DEXAMETHASONE 4 MG/1
4 TABLET ORAL EVERY 12 HOURS SCHEDULED
Status: COMPLETED | OUTPATIENT
Start: 2022-10-21 | End: 2022-10-23

## 2022-10-17 RX ORDER — DEXAMETHASONE 4 MG/1
4 TABLET ORAL DAILY
Status: COMPLETED | OUTPATIENT
Start: 2022-10-24 | End: 2022-10-25

## 2022-10-17 RX ORDER — FENTANYL CITRATE 50 UG/ML
25 INJECTION, SOLUTION INTRAMUSCULAR; INTRAVENOUS ONCE
Status: COMPLETED | OUTPATIENT
Start: 2022-10-17 | End: 2022-10-17

## 2022-10-17 RX ORDER — DEXAMETHASONE 4 MG/1
4 TABLET ORAL EVERY 6 HOURS SCHEDULED
Status: DISPENSED | OUTPATIENT
Start: 2022-10-17 | End: 2022-10-19

## 2022-10-17 RX ADMIN — BUSPIRONE HYDROCHLORIDE 10 MG: 10 TABLET ORAL at 09:57

## 2022-10-17 RX ADMIN — GLIPIZIDE 10 MG: 10 TABLET ORAL at 17:12

## 2022-10-17 RX ADMIN — ACETAMINOPHEN 650 MG: 325 TABLET ORAL at 20:35

## 2022-10-17 RX ADMIN — SODIUM CHLORIDE, PRESERVATIVE FREE 10 ML: 5 INJECTION INTRAVENOUS at 21:17

## 2022-10-17 RX ADMIN — MORPHINE SULFATE 4 MG: 4 INJECTION INTRAVENOUS at 14:58

## 2022-10-17 RX ADMIN — METFORMIN HYDROCHLORIDE 1000 MG: 500 TABLET ORAL at 17:13

## 2022-10-17 RX ADMIN — METHOCARBAMOL TABLETS 750 MG: 750 TABLET, COATED ORAL at 20:35

## 2022-10-17 RX ADMIN — ROPINIROLE HYDROCHLORIDE 2 MG: 1 TABLET, FILM COATED ORAL at 20:34

## 2022-10-17 RX ADMIN — BUSPIRONE HYDROCHLORIDE 10 MG: 10 TABLET ORAL at 17:12

## 2022-10-17 RX ADMIN — MORPHINE SULFATE 4 MG: 4 INJECTION INTRAVENOUS at 06:04

## 2022-10-17 RX ADMIN — GABAPENTIN 300 MG: 300 CAPSULE ORAL at 09:57

## 2022-10-17 RX ADMIN — DEXAMETHASONE 4 MG: 4 TABLET ORAL at 17:13

## 2022-10-17 RX ADMIN — METFORMIN HYDROCHLORIDE 1000 MG: 500 TABLET ORAL at 09:57

## 2022-10-17 RX ADMIN — GABAPENTIN 300 MG: 300 CAPSULE ORAL at 20:35

## 2022-10-17 RX ADMIN — PANTOPRAZOLE SODIUM 40 MG: 40 TABLET, DELAYED RELEASE ORAL at 09:57

## 2022-10-17 RX ADMIN — DIAZEPAM 2 MG: 2 TABLET ORAL at 07:33

## 2022-10-17 RX ADMIN — SODIUM CHLORIDE: 9 INJECTION, SOLUTION INTRAVENOUS at 14:58

## 2022-10-17 RX ADMIN — CELECOXIB 100 MG: 100 CAPSULE ORAL at 09:58

## 2022-10-17 RX ADMIN — DEXAMETHASONE 4 MG: 4 TABLET ORAL at 12:04

## 2022-10-17 RX ADMIN — METHOCARBAMOL TABLETS 750 MG: 750 TABLET, COATED ORAL at 14:07

## 2022-10-17 RX ADMIN — FUROSEMIDE 20 MG: 20 TABLET ORAL at 09:58

## 2022-10-17 RX ADMIN — METHOCARBAMOL TABLETS 750 MG: 750 TABLET, COATED ORAL at 09:58

## 2022-10-17 RX ADMIN — DESMOPRESSIN ACETATE 40 MG: 0.2 TABLET ORAL at 20:34

## 2022-10-17 RX ADMIN — METHOCARBAMOL TABLETS 750 MG: 750 TABLET, COATED ORAL at 17:13

## 2022-10-17 RX ADMIN — GADOTERIDOL 17 ML: 279.3 INJECTION, SOLUTION INTRAVENOUS at 08:41

## 2022-10-17 RX ADMIN — CYCLOBENZAPRINE 10 MG: 10 TABLET, FILM COATED ORAL at 12:04

## 2022-10-17 RX ADMIN — FENTANYL CITRATE 25 MCG: 50 INJECTION, SOLUTION INTRAMUSCULAR; INTRAVENOUS at 18:25

## 2022-10-17 RX ADMIN — DEXAMETHASONE 4 MG: 4 TABLET ORAL at 06:04

## 2022-10-17 RX ADMIN — SERTRALINE 50 MG: 50 TABLET, FILM COATED ORAL at 09:57

## 2022-10-17 RX ADMIN — LISINOPRIL 40 MG: 20 TABLET ORAL at 09:57

## 2022-10-17 ASSESSMENT — PAIN SCALES - GENERAL
PAINLEVEL_OUTOF10: 9
PAINLEVEL_OUTOF10: 8
PAINLEVEL_OUTOF10: 9
PAINLEVEL_OUTOF10: 10
PAINLEVEL_OUTOF10: 8
PAINLEVEL_OUTOF10: 9
PAINLEVEL_OUTOF10: 9

## 2022-10-17 ASSESSMENT — PAIN DESCRIPTION - LOCATION
LOCATION: BACK;LEG
LOCATION: BUTTOCKS;LEG
LOCATION: BACK;LEG
LOCATION: BACK;LEG;BUTTOCKS
LOCATION: BACK
LOCATION: BUTTOCKS;LEG

## 2022-10-17 ASSESSMENT — PAIN DESCRIPTION - ORIENTATION
ORIENTATION: LEFT
ORIENTATION: LEFT
ORIENTATION: LEFT;UPPER;OUTER
ORIENTATION: LEFT
ORIENTATION: UPPER;OUTER
ORIENTATION: LEFT

## 2022-10-17 ASSESSMENT — PAIN DESCRIPTION - DESCRIPTORS
DESCRIPTORS: SHARP
DESCRIPTORS: SHOOTING
DESCRIPTORS: STABBING
DESCRIPTORS: SHARP
DESCRIPTORS: ACHING
DESCRIPTORS: SHOOTING
DESCRIPTORS: STABBING
DESCRIPTORS: ACHING

## 2022-10-17 NOTE — PROGRESS NOTES
Neurosurgery [facility]/Resident    Daily Progress Note   Chief Complaint   Patient presents with    Back Pain     Mainly left sided     10/17/2022  10:57 AM    Chart reviewed. No acute events overnight. No new complaints. Continues to have pain left buttock. Vitals:    10/16/22 2315 10/16/22 2356 10/17/22 0730 10/17/22 0934   BP: 133/79  (!) 144/88 (!) 160/89   Pulse: 84  66 80   Resp: 12 12 12 17   Temp: 98.1 °F (36.7 °C)      TempSrc: Oral      SpO2: 96%  95% 91%   Weight:       Height:             PE: AOx3   Motor   L deltoid 5/5; R deltoid 5/5  L biceps 5/5; R biceps 5/5  L triceps 5/5; R triceps 5/5  L intrinsics 5/5; R intrinsics 5/5      L iliopsoas 5/5 , R iliopsoas 5/5  L quadriceps 5/5; R quadriceps 5/5  L Dorsiflexion 4+/5; R dorsiflexion 5/5  L Plantarflexion 5/5; R plantarflexion 5/5  L EHL 4+/5; R EHL 5/5        Lab Results   Component Value Date    WBC 13.3 (H) 10/14/2022    HGB 13.5 10/14/2022    HCT 42.1 10/14/2022     10/14/2022     10/14/2022    K 4.5 10/14/2022     10/14/2022    CREATININE 0.83 10/14/2022    BUN 15 10/14/2022    CO2 25 10/14/2022    INR 0.9 10/14/2022       Radiology   MRI PELVIS W WO CONTRAST    Result Date: 10/17/2022  EXAMINATION: MRI OF THE PELVIS WITHOUT AND WITH CONTRAST, 10/17/2022 8:11 am TECHNIQUE: Multiplanar multisequence MRI of the pelvis was performed without and with the administration of intravenous contrast. COMPARISON: None. HISTORY: ORDERING SYSTEM PROVIDED HISTORY: left piriformis syndrome TECHNOLOGIST PROVIDED HISTORY: left piriformis syndrome Reason for Exam: left piriformis syndrome 81-year-old female with left-sided piriformis syndrome. FINDINGS: The right piriformis muscle demonstrates normal muscle bulk without significant edema or atrophy/fatty degeneration or hypertrophy. There is slight asymmetric atrophy of the left piriformis muscle.  Mild to moderate edema within the right gluteus julissa muscle on image 29, series 7 and images 16-15, series 4. On image 13, series 3. This is also well seen on image 21, series 7 and image 21, series 8. Marked distention of the urinary bladder. Visualized sciatic nerves demonstrate normal course, contour, and caliber on T1 weighted imaging. Low-grade partial-thickness tearing of the insertional fibers of the bilateral gluteus medius tendons. Mild edema in the posterior left obturator internus muscle on image 33, series 7. Both femoral heads properly located in the bilateral acetabula without clear evidence for acute fracture, dislocation or femoral head flattening. No significant marrow edema in the sacral ala, iliac wings, lumbar spine. No femoral head AVN. No sizable hip joint effusions. No significant osteophyte spurring of the bilateral hip joint spaces. No evidence for bilateral paralabral cyst formation. Mild nonspecific edema in the subcutaneous fat at the lateral aspect of the right hip/thigh. No organized fluid collection. Mild edema in the bilateral paraspinal musculature. 1. Slight asymmetric atrophy of the left-sided piriformis muscle which can be seen with left-sided piriformis syndrome. 2. Mild to moderate nonspecific edema in the right gluteus julissa muscle. However, no significant gluteal muscular atrophy. 3. Mild nonspecific edema in the posterior left obturator internus muscle. 4. No acute fracture or dislocation. No femoral head AVN. 5. Mild nonspecific edema in the subcutaneous fat at the lateral aspect of the right hip/thigh. A/P  65 y.o. female who presents with lumbar disc disease with radiculopathy and S1    - recommend caudal SOYN injection today  - MRI pelvis reviewed with Dr Esther Montes. No neurosurgical interventions needed.  Will have patient follow up with Dr Jose Antonio Silver at [facility] pain management for piriformis syndrome   - continue oral steroids 4 mg q6 hours for another day then wean off over 1 week      Please contact neurosurgery with any changes in patients neurologic status.        Eden Mistry, CNP  10/17/22  10:57 AM

## 2022-10-17 NOTE — PROGRESS NOTES
OBS/CDU   RESIDENT NOTE      Patients PCP is: No primary care provider on file. SUBJECTIVE      No acute events overnight. Has been able to tolerate a full diet without nausea or vomiting. The patient is urinating on his own and is passing flatus. Denies fever, chills, nausea, vomiting, chest pain, shortness of breath, abdominal pain, focal weakness, numbness, tingling, urinary/bowel symptoms, vision changes, visual hallucinations, or headache. Neurosurgery consult ulnar tunnel release. Plan for epidural spinal injection. Patient given regular diet. Lovenox and aspirin continue to be held. Patient moderately comfortable smiling in bed. No acute distress. PHYSICAL EXAM      General: NAD, AO X 3, moderately discomfortable  Heent: EMOI, PERRL  Neck: SUPPLE, NO JVD  Cardiovascular: RRR, S1S2  Pulmonary: CTAB, NO SOB  Abdomen: SOFT, NTTP, ND, +BS  Extremities: +2/4 PULSES DISTAL, NO SWELLING  Neuro / Psych: NO NUMBNESS OR TINGLING, MENTATION AT BASELINE    PERTINENT TEST /EXAMS      I have reviewed all available laboratory results.     MEDICATIONS CURRENT   albuterol sulfate HFA (PROVENTIL;VENTOLIN;PROAIR) 108 (90 Base) MCG/ACT inhaler 2 puff, Q6H PRN  aspirin EC tablet 81 mg, Daily  atorvastatin (LIPITOR) tablet 40 mg, Nightly  busPIRone (BUSPAR) tablet 10 mg, BID  celecoxib (CELEBREX) capsule 100 mg, BID  cyclobenzaprine (FLEXERIL) tablet 10 mg, TID PRN  diazePAM (VALIUM) tablet 2 mg, Q6H PRN  [START ON 10/20/2022] Dulaglutide SOPN 1.5 mg, Weekly  furosemide (LASIX) tablet 20 mg, Daily  gabapentin (NEURONTIN) capsule 300 mg, BID  glipiZIDE (GLUCOTROL) tablet 10 mg, BID AC  lisinopril (PRINIVIL;ZESTRIL) tablet 40 mg, Daily  pantoprazole (PROTONIX) tablet 40 mg, Daily  rOPINIRole (REQUIP) tablet 2 mg, Nightly  sertraline (ZOLOFT) tablet 50 mg, Daily  metFORMIN (GLUCOPHAGE) tablet 1,000 mg, BID WC  dexamethasone (DECADRON) tablet 4 mg, 4 times per day  0.9 % sodium chloride infusion, Continuous  morphine injection 4 mg, Q2H PRN  methocarbamol (ROBAXIN) tablet 750 mg, 4x Daily  sodium chloride flush 0.9 % injection 5-40 mL, 2 times per day  sodium chloride flush 0.9 % injection 5-40 mL, PRN  0.9 % sodium chloride infusion, PRN  potassium chloride (KLOR-CON M) extended release tablet 40 mEq, PRN   Or  potassium bicarb-citric acid (EFFER-K) effervescent tablet 40 mEq, PRN   Or  potassium chloride 10 mEq/100 mL IVPB (Peripheral Line), PRN  enoxaparin (LOVENOX) injection 40 mg, Daily  ondansetron (ZOFRAN) injection 4 mg, Q4H PRN  polyethylene glycol (GLYCOLAX) packet 17 g, Daily PRN  acetaminophen (TYLENOL) tablet 650 mg, Q6H PRN   Or  acetaminophen (TYLENOL) suppository 650 mg, Q6H PRN  morphine injection 4 mg, Once      All medication charted and reviewed. CONSULTS      IP CONSULT TO NEUROSURGERY  IP CONSULT TO FAMILY MEDICINE    ASSESSMENT/PLAN       Kedar Watkins is a 65 y.o. female who presents with back pain     Sciatica back pain   Neurosurgery consulted. Ulnar surgery canceled due to patient's back pain being a priority. Neurosurgery to perform epidural spinal injection today 10/17  Family medicine consulted as this is a family medicine practice patient and transfer of care would be appropriate. Continue home medications and pain control  Monitor vitals, labs, and imaging  DISPO: pending consults and clinical improvement    --  Lola Cancino, DO  Emergency Medicine Resident Physician     This dictation was generated by voice recognition computer software. Although all attempts are made to edit the dictation for accuracy, there may be errors in the transcription that are not intended.

## 2022-10-17 NOTE — PROGRESS NOTES
[address]  CDU / OBSERVATION ENCOUNTER  ATTENDING NOTE       I performed a history and physical examination of the patient and discussed management with the resident or midlevel provider. I reviewed the resident or midlevel provider's note and agree with the documented findings and plan of care. Any areas of disagreement are noted on the chart. I was personally present for the key portions of any procedures. I have documented in the chart those procedures where I was not present during the key portions. I have reviewed the nurses notes. I agree with the chief complaint, past medical history, past surgical history, allergies, medications, social and family history as documented unless otherwise noted below. The Family history, social history, and ROS are effectively unchanged since admission unless noted elsewhere in the chart. Patient has been evaluated by attending neurosurgeon. Patient with ongoing pain and patient had been scheduled for cubital tunnel decompression. In light of patient's significant back pain and ongoing discomfort and need for use of her upper extremities patient's cubital tunnel release will be held for now. Patient to be treated as piriformis syndrome. Patient has been seen by neurosurgery with injections planned.   Will reassess after injections    Jose Zavala MD  Attending Emergency  Physician

## 2022-10-18 ENCOUNTER — APPOINTMENT (OUTPATIENT)
Dept: INTERVENTIONAL RADIOLOGY/VASCULAR | Age: 62
DRG: 552 | End: 2022-10-18
Payer: COMMERCIAL

## 2022-10-18 LAB
GLUCOSE BLD-MCNC: 258 MG/DL (ref 65–105)
GLUCOSE BLD-MCNC: 260 MG/DL (ref 65–105)
GLUCOSE BLD-MCNC: 285 MG/DL (ref 65–105)

## 2022-10-18 PROCEDURE — 1200000000 HC SEMI PRIVATE

## 2022-10-18 PROCEDURE — 6360000004 HC RX CONTRAST MEDICATION: Performed by: EMERGENCY MEDICINE

## 2022-10-18 PROCEDURE — 82947 ASSAY GLUCOSE BLOOD QUANT: CPT

## 2022-10-18 PROCEDURE — 6360000002 HC RX W HCPCS: Performed by: REGISTERED NURSE

## 2022-10-18 PROCEDURE — 2580000003 HC RX 258: Performed by: REGISTERED NURSE

## 2022-10-18 PROCEDURE — 99232 SBSQ HOSP IP/OBS MODERATE 35: CPT | Performed by: NEUROLOGICAL SURGERY

## 2022-10-18 PROCEDURE — 2580000003 HC RX 258: Performed by: STUDENT IN AN ORGANIZED HEALTH CARE EDUCATION/TRAINING PROGRAM

## 2022-10-18 PROCEDURE — 6370000000 HC RX 637 (ALT 250 FOR IP)

## 2022-10-18 PROCEDURE — 2709999900 HC NON-CHARGEABLE SUPPLY

## 2022-10-18 PROCEDURE — A4216 STERILE WATER/SALINE, 10 ML: HCPCS | Performed by: REGISTERED NURSE

## 2022-10-18 PROCEDURE — 6360000002 HC RX W HCPCS: Performed by: EMERGENCY MEDICINE

## 2022-10-18 PROCEDURE — 2500000003 HC RX 250 WO HCPCS: Performed by: REGISTERED NURSE

## 2022-10-18 PROCEDURE — 62323 NJX INTERLAMINAR LMBR/SAC: CPT

## 2022-10-18 PROCEDURE — 6370000000 HC RX 637 (ALT 250 FOR IP): Performed by: STUDENT IN AN ORGANIZED HEALTH CARE EDUCATION/TRAINING PROGRAM

## 2022-10-18 PROCEDURE — 6370000000 HC RX 637 (ALT 250 FOR IP): Performed by: EMERGENCY MEDICINE

## 2022-10-18 PROCEDURE — 3E0R33Z INTRODUCTION OF ANTI-INFLAMMATORY INTO SPINAL CANAL, PERCUTANEOUS APPROACH: ICD-10-PCS | Performed by: RADIOLOGY

## 2022-10-18 RX ORDER — INSULIN LISPRO 100 [IU]/ML
0-4 INJECTION, SOLUTION INTRAVENOUS; SUBCUTANEOUS NIGHTLY
Status: DISCONTINUED | OUTPATIENT
Start: 2022-10-18 | End: 2022-10-25 | Stop reason: HOSPADM

## 2022-10-18 RX ORDER — FENTANYL CITRATE 50 UG/ML
50 INJECTION, SOLUTION INTRAMUSCULAR; INTRAVENOUS ONCE
Status: COMPLETED | OUTPATIENT
Start: 2022-10-18 | End: 2022-10-18

## 2022-10-18 RX ORDER — AMLODIPINE BESYLATE 5 MG/1
5 TABLET ORAL ONCE
Status: COMPLETED | OUTPATIENT
Start: 2022-10-18 | End: 2022-10-18

## 2022-10-18 RX ORDER — DEXTROSE MONOHYDRATE 100 MG/ML
INJECTION, SOLUTION INTRAVENOUS CONTINUOUS PRN
Status: DISCONTINUED | OUTPATIENT
Start: 2022-10-18 | End: 2022-10-25 | Stop reason: HOSPADM

## 2022-10-18 RX ORDER — INSULIN LISPRO 100 [IU]/ML
0-4 INJECTION, SOLUTION INTRAVENOUS; SUBCUTANEOUS
Status: DISCONTINUED | OUTPATIENT
Start: 2022-10-18 | End: 2022-10-25 | Stop reason: HOSPADM

## 2022-10-18 RX ORDER — LANOLIN ALCOHOL/MO/W.PET/CERES
3 CREAM (GRAM) TOPICAL ONCE
Status: COMPLETED | OUTPATIENT
Start: 2022-10-18 | End: 2022-10-18

## 2022-10-18 RX ADMIN — PANTOPRAZOLE SODIUM 40 MG: 40 TABLET, DELAYED RELEASE ORAL at 08:23

## 2022-10-18 RX ADMIN — GLIPIZIDE 10 MG: 10 TABLET ORAL at 08:22

## 2022-10-18 RX ADMIN — BUSPIRONE HYDROCHLORIDE 10 MG: 10 TABLET ORAL at 16:56

## 2022-10-18 RX ADMIN — ROPINIROLE HYDROCHLORIDE 2 MG: 1 TABLET, FILM COATED ORAL at 20:17

## 2022-10-18 RX ADMIN — METHOCARBAMOL TABLETS 750 MG: 750 TABLET, COATED ORAL at 12:33

## 2022-10-18 RX ADMIN — METFORMIN HYDROCHLORIDE 1000 MG: 500 TABLET ORAL at 16:56

## 2022-10-18 RX ADMIN — GABAPENTIN 300 MG: 300 CAPSULE ORAL at 20:17

## 2022-10-18 RX ADMIN — DEXAMETHASONE 4 MG: 4 TABLET ORAL at 12:33

## 2022-10-18 RX ADMIN — CELECOXIB 100 MG: 100 CAPSULE ORAL at 20:17

## 2022-10-18 RX ADMIN — ACETAMINOPHEN 650 MG: 325 TABLET ORAL at 20:17

## 2022-10-18 RX ADMIN — BUSPIRONE HYDROCHLORIDE 10 MG: 10 TABLET ORAL at 08:23

## 2022-10-18 RX ADMIN — GABAPENTIN 300 MG: 300 CAPSULE ORAL at 08:22

## 2022-10-18 RX ADMIN — LISINOPRIL 40 MG: 20 TABLET ORAL at 08:22

## 2022-10-18 RX ADMIN — METFORMIN HYDROCHLORIDE 1000 MG: 500 TABLET ORAL at 08:23

## 2022-10-18 RX ADMIN — FENTANYL CITRATE 50 MCG: 50 INJECTION, SOLUTION INTRAMUSCULAR; INTRAVENOUS at 02:45

## 2022-10-18 RX ADMIN — MORPHINE SULFATE 4 MG: 4 INJECTION INTRAVENOUS at 06:35

## 2022-10-18 RX ADMIN — AMLODIPINE BESYLATE 5 MG: 5 TABLET ORAL at 12:33

## 2022-10-18 RX ADMIN — DEXAMETHASONE 4 MG: 4 TABLET ORAL at 16:56

## 2022-10-18 RX ADMIN — METHOCARBAMOL TABLETS 750 MG: 750 TABLET, COATED ORAL at 20:17

## 2022-10-18 RX ADMIN — SODIUM CHLORIDE, PRESERVATIVE FREE 10 ML: 5 INJECTION INTRAVENOUS at 20:50

## 2022-10-18 RX ADMIN — IOPAMIDOL 3 ML: 408 INJECTION, SOLUTION INTRATHECAL at 09:23

## 2022-10-18 RX ADMIN — DEXAMETHASONE 4 MG: 4 TABLET ORAL at 00:02

## 2022-10-18 RX ADMIN — CELECOXIB 100 MG: 100 CAPSULE ORAL at 08:23

## 2022-10-18 RX ADMIN — FUROSEMIDE 20 MG: 20 TABLET ORAL at 08:23

## 2022-10-18 RX ADMIN — DEXAMETHASONE SODIUM PHOSPHATE: 10 INJECTION, SOLUTION INTRAMUSCULAR; INTRAVENOUS at 09:14

## 2022-10-18 RX ADMIN — SERTRALINE 50 MG: 50 TABLET, FILM COATED ORAL at 08:23

## 2022-10-18 RX ADMIN — MORPHINE SULFATE 4 MG: 4 INJECTION INTRAVENOUS at 00:21

## 2022-10-18 RX ADMIN — DESMOPRESSIN ACETATE 40 MG: 0.2 TABLET ORAL at 20:17

## 2022-10-18 RX ADMIN — DEXAMETHASONE 4 MG: 4 TABLET ORAL at 06:34

## 2022-10-18 RX ADMIN — INSULIN LISPRO 2 UNITS: 100 INJECTION, SOLUTION INTRAVENOUS; SUBCUTANEOUS at 12:35

## 2022-10-18 RX ADMIN — MORPHINE SULFATE 4 MG: 4 INJECTION INTRAVENOUS at 10:09

## 2022-10-18 RX ADMIN — METHOCARBAMOL TABLETS 750 MG: 750 TABLET, COATED ORAL at 16:55

## 2022-10-18 RX ADMIN — GLIPIZIDE 10 MG: 10 TABLET ORAL at 16:56

## 2022-10-18 RX ADMIN — METHOCARBAMOL TABLETS 750 MG: 750 TABLET, COATED ORAL at 08:23

## 2022-10-18 RX ADMIN — INSULIN LISPRO 2 UNITS: 100 INJECTION, SOLUTION INTRAVENOUS; SUBCUTANEOUS at 16:57

## 2022-10-18 RX ADMIN — Medication 3 MG: at 12:33

## 2022-10-18 ASSESSMENT — PAIN DESCRIPTION - ORIENTATION
ORIENTATION: LEFT
ORIENTATION: LEFT;OUTER;UPPER
ORIENTATION: LEFT;OUTER;UPPER
ORIENTATION: LEFT
ORIENTATION: LEFT;OUTER;UPPER
ORIENTATION: LEFT;OUTER;UPPER
ORIENTATION: LEFT;OUTER
ORIENTATION: LEFT
ORIENTATION: LEFT

## 2022-10-18 ASSESSMENT — PAIN DESCRIPTION - LOCATION
LOCATION: BUTTOCKS;LEG
LOCATION: BUTTOCKS;LEG
LOCATION: BACK;LEG
LOCATION: BACK;BUTTOCKS;LEG
LOCATION: BUTTOCKS;LEG
LOCATION: BACK;LEG
LOCATION: BUTTOCKS;LEG
LOCATION: BUTTOCKS;LEG
LOCATION: LEG;BACK

## 2022-10-18 ASSESSMENT — PAIN SCALES - GENERAL
PAINLEVEL_OUTOF10: 9
PAINLEVEL_OUTOF10: 7
PAINLEVEL_OUTOF10: 5
PAINLEVEL_OUTOF10: 7
PAINLEVEL_OUTOF10: 5
PAINLEVEL_OUTOF10: 9
PAINLEVEL_OUTOF10: 7
PAINLEVEL_OUTOF10: 10
PAINLEVEL_OUTOF10: 8
PAINLEVEL_OUTOF10: 5

## 2022-10-18 ASSESSMENT — PAIN DESCRIPTION - DESCRIPTORS
DESCRIPTORS: SHARP;SORE
DESCRIPTORS: NAGGING;ACHING
DESCRIPTORS: SORE;SHARP
DESCRIPTORS: SORE;SHARP
DESCRIPTORS: ACHING
DESCRIPTORS: SHARP;SORE
DESCRIPTORS: SHARP;SHOOTING;SORE
DESCRIPTORS: STABBING
DESCRIPTORS: SHOOTING

## 2022-10-18 ASSESSMENT — PAIN - FUNCTIONAL ASSESSMENT
PAIN_FUNCTIONAL_ASSESSMENT: PREVENTS OR INTERFERES SOME ACTIVE ACTIVITIES AND ADLS

## 2022-10-18 NOTE — PROGRESS NOTES
Pt arrives to room for caudal epidural steroid injection  Placed prone on table  KP RT to bedside  Dr Whatley Sinks to bedside  Site prepped and draped  Access obtained and epidural injection given per order  Access removed and site covered with band aid  Tolerated well  Return to floor

## 2022-10-18 NOTE — PLAN OF CARE
Problem: Pain  Goal: Verbalizes/displays adequate comfort level or baseline comfort level  10/18/2022 0306 by Kemi Pro RN  Outcome: Progressing  10/17/2022 1725 by Mariama Tatum RN  Outcome: Progressing     Problem: Safety - Adult  Goal: Free from fall injury  10/18/2022 0306 by Kemi Pro RN  Outcome: Progressing  10/17/2022 1725 by Mariama Tatum RN  Outcome: Progressing     Problem: Discharge Planning  Goal: Discharge to home or other facility with appropriate resources  10/18/2022 0306 by Kemi Pro RN  Outcome: Progressing  10/17/2022 1725 by Mariama Tatum RN  Outcome: Progressing

## 2022-10-18 NOTE — PROGRESS NOTES
OBS/CDU   RESIDENT NOTE      Patients PCP is: No primary care provider on file. SUBJECTIVE      No acute events overnight. Has been able to tolerate a full diet without nausea or vomiting. The patient is urinating on his own and is passing flatus. Denies fever, chills, nausea, vomiting, chest pain, shortness of breath, abdominal pain, focal weakness, numbness, tingling, urinary/bowel symptoms, vision changes, visual hallucinations, or headache. Patient sitting in bed and in no acute distress. Patient tolerating breakfast this morning. Neurosurgery planning to take patient for epidural spinal injection. Discussed with patient that we will reassess her following the procedure. Patient amenable to seeing pain management at [facility] following confirmation with insurance company. Also discussed steroid taper over the next week. PHYSICAL EXAM      General: NAD, AO X 3, moderately discomfortable  Heent: EMOI, PERRL  Neck: SUPPLE, NO JVD  Cardiovascular: RRR, S1S2  Pulmonary: CTAB, NO SOB  Abdomen: SOFT, NTTP, ND, +BS  Extremities: +2/4 PULSES DISTAL, NO SWELLING  Neuro / Psych: NO NUMBNESS OR TINGLING, MENTATION AT BASELINE    PERTINENT TEST /EXAMS      I have reviewed all available laboratory results.     MEDICATIONS CURRENT   dexamethasone (DECADRON) tablet 4 mg, 4 times per day   Followed by  [START ON 10/19/2022] dexamethasone (DECADRON) tablet 4 mg, 3 times per day   Followed by  [START ON 10/21/2022] dexamethasone (DECADRON) tablet 4 mg, 2 times per day   Followed by  [START ON 10/24/2022] dexamethasone (DECADRON) tablet 4 mg, Daily   Followed by  Gabriela Valerio ON 10/26/2022] dexamethasone (DECADRON) tablet 2 mg, Daily  albuterol sulfate HFA (PROVENTIL;VENTOLIN;PROAIR) 108 (90 Base) MCG/ACT inhaler 2 puff, Q6H PRN  aspirin EC tablet 81 mg, Daily  atorvastatin (LIPITOR) tablet 40 mg, Nightly  busPIRone (BUSPAR) tablet 10 mg, BID  celecoxib (CELEBREX) capsule 100 mg, BID  cyclobenzaprine (FLEXERIL) tablet 10 mg, TID PRN  diazePAM (VALIUM) tablet 2 mg, Q6H PRN  [START ON 10/20/2022] Dulaglutide SOPN 1.5 mg, Weekly  furosemide (LASIX) tablet 20 mg, Daily  gabapentin (NEURONTIN) capsule 300 mg, BID  glipiZIDE (GLUCOTROL) tablet 10 mg, BID AC  lisinopril (PRINIVIL;ZESTRIL) tablet 40 mg, Daily  pantoprazole (PROTONIX) tablet 40 mg, Daily  rOPINIRole (REQUIP) tablet 2 mg, Nightly  sertraline (ZOLOFT) tablet 50 mg, Daily  metFORMIN (GLUCOPHAGE) tablet 1,000 mg, BID WC  0.9 % sodium chloride infusion, Continuous  morphine injection 4 mg, Q2H PRN  methocarbamol (ROBAXIN) tablet 750 mg, 4x Daily  sodium chloride flush 0.9 % injection 5-40 mL, 2 times per day  sodium chloride flush 0.9 % injection 5-40 mL, PRN  0.9 % sodium chloride infusion, PRN  potassium chloride (KLOR-CON M) extended release tablet 40 mEq, PRN   Or  potassium bicarb-citric acid (EFFER-K) effervescent tablet 40 mEq, PRN   Or  potassium chloride 10 mEq/100 mL IVPB (Peripheral Line), PRN  enoxaparin (LOVENOX) injection 40 mg, Daily  ondansetron (ZOFRAN) injection 4 mg, Q4H PRN  polyethylene glycol (GLYCOLAX) packet 17 g, Daily PRN  acetaminophen (TYLENOL) tablet 650 mg, Q6H PRN   Or  acetaminophen (TYLENOL) suppository 650 mg, Q6H PRN  morphine injection 4 mg, Once      All medication charted and reviewed. CONSULTS      IP CONSULT TO NEUROSURGERY  IP CONSULT TO FAMILY MEDICINE    ASSESSMENT/PLAN       Jenn Andrew is a 65 y.o. female who presents with back pain     Sciatica back pain   Neurosurgery consulted. Ulnar surgery canceled due to patient's back pain being a priority. Neurosurgery to perform epidural spinal injection this morning 10/18  Patient recommended to see Dr. Hussein Singletary at [facility] for pain management for piriformis syndrome. Last day (10/18) of 4 mg of oral steroids every 6 hours. Will need to wean over the next 7 days. Family medicine consulted as this is a family medicine practice patient and transfer of care would be appropriate. Continue home medications and pain control  Monitor vitals, labs, and imaging  DISPO: pending consults and clinical improvement  Patient mentioned to nurse today 10/15 that she is concerned that she may have had a stroke in May of this year. She has not been evaluated for any strokelike symptoms but recalls having left-sided weakness, tremors, difficulty with memory and word searching. Discussed with patient that we can arrange for outpatient MRI of the head and neck. --  Cash Taylor,   Emergency Medicine Resident Physician     This dictation was generated by voice recognition computer software. Although all attempts are made to edit the dictation for accuracy, there may be errors in the transcription that are not intended.

## 2022-10-18 NOTE — BRIEF OP NOTE
Brief Postoperative Note    Jenn Andrew  YOB: 1960  [id number]    Pre-operative Diagnosis: Back pain    Post-operative Diagnosis: Same    Procedure: Caudal epidural steroid injection    Anesthesia: Local    Surgeons/Assistants: Celina Pereyra MD    Estimated Blood Loss: less than 50     Complications: None    Specimens: Was Not Obtained    Findings: Successful caudal epidural steroid injection     Electronically signed by TIGIST Acevedo on 10/18/2022 at 9:24 AM

## 2022-10-18 NOTE — PROGRESS NOTES
Neurosurgery CALEB/Resident    Daily Progress Note   Chief Complaint   Patient presents with    Back Pain     Mainly left sided     10/18/2022  1:12 PM    Chart reviewed. No acute events overnight. No new complaints. Vitals:    10/18/22 1036 10/18/22 1039 10/18/22 1053 10/18/22 1227   BP: (!) 174/86  (!) 176/93 (!) 171/86   Pulse: 62  75 78   Resp: 19 21 17 18   Temp:    98.1 °F (36.7 °C)   TempSrc:    Oral   SpO2:    95%   Weight:       Height:           PE:   AOx3   Motor   L deltoid 5/5; R deltoid 5/5  L biceps 5/5; R biceps 5/5  L triceps 5/5; R triceps 5/5  L intrinsics 5/5; R intrinsics 5/5      L iliopsoas 5/5 , R iliopsoas 5/5  L quadriceps 5/5; R quadriceps 5/5  L Dorsiflexion 4+/5; R dorsiflexion 5/5  L Plantarflexion 5/5; R plantarflexion 5/5  L EHL 4+/5; R EHL 5/5    Lab Results   Component Value Date    WBC 13.3 (H) 10/14/2022    HGB 13.5 10/14/2022    HCT 42.1 10/14/2022     10/14/2022     10/14/2022    K 4.5 10/14/2022     10/14/2022    CREATININE 0.83 10/14/2022    BUN 15 10/14/2022    CO2 25 10/14/2022    INR 0.9 10/14/2022         A/P  65 y.o. female who presents with lumbar disc disease with radiculopathy and S1     - f/u after caudal SONY injection today  - MRI pelvis reviewed with Dr Chanelle Gonzalez. No neurosurgical interventions needed. Will have patient follow up with Dr Yanna Sorensen at [facility] pain management for piriformis syndrome   - continue oral steroids 4 mg q6 hours for another day then wean off over 1 week      Please contact neurosurgery with any changes in patients neurologic status.        Paul Falcon CNP  10/18/22  1:12 PM

## 2022-10-18 NOTE — PROGRESS NOTES
[address]  CDU / OBSERVATION ENCOUNTER  ATTENDING NOTE       I performed a history and physical examination of the patient and discussed management with the resident or midlevel provider. I reviewed the resident or midlevel provider's note and agree with the documented findings and plan of care. Any areas of disagreement are noted on the chart. I was personally present for the key portions of any procedures. I have documented in the chart those procedures where I was not present during the key portions. I have reviewed the nurses notes. I agree with the chief complaint, past medical history, past surgical history, allergies, medications, social and family history as documented unless otherwise noted below. The Family history, social history, and ROS are effectively unchanged since admission unless noted elsewhere in the chart. Patient admitted to the ETU for back pain. Neurosurgery has been consulted and an epidural injection was done this morning. States that her pain has improved a little. She says instead of her pain being a 7-10 is now about a 5. She has no new complaints. We will reassess in a little while as patient just got back from the injection. If feeling better, will possibly discharge home today. Otherwise, we will continue pain control.     Arianne Wynn MD  Attending Emergency  Physician

## 2022-10-19 LAB
ANION GAP SERPL CALCULATED.3IONS-SCNC: 14 MMOL/L (ref 9–17)
BUN BLDV-MCNC: 33 MG/DL (ref 8–23)
CALCIUM SERPL-MCNC: 8.9 MG/DL (ref 8.6–10.4)
CHLORIDE BLD-SCNC: 102 MMOL/L (ref 98–107)
CO2: 22 MMOL/L (ref 20–31)
CREAT SERPL-MCNC: 1.08 MG/DL (ref 0.5–0.9)
GFR SERPL CREATININE-BSD FRML MDRD: 58 ML/MIN/1.73M2
GLUCOSE BLD-MCNC: 159 MG/DL (ref 70–99)
GLUCOSE BLD-MCNC: 169 MG/DL (ref 65–105)
GLUCOSE BLD-MCNC: 248 MG/DL (ref 65–105)
GLUCOSE BLD-MCNC: 283 MG/DL (ref 65–105)
GLUCOSE BLD-MCNC: 287 MG/DL (ref 65–105)
POTASSIUM SERPL-SCNC: 4.6 MMOL/L (ref 3.7–5.3)
SODIUM BLD-SCNC: 138 MMOL/L (ref 135–144)

## 2022-10-19 PROCEDURE — 6360000002 HC RX W HCPCS: Performed by: REGISTERED NURSE

## 2022-10-19 PROCEDURE — 36415 COLL VENOUS BLD VENIPUNCTURE: CPT

## 2022-10-19 PROCEDURE — 6360000002 HC RX W HCPCS: Performed by: EMERGENCY MEDICINE

## 2022-10-19 PROCEDURE — 6370000000 HC RX 637 (ALT 250 FOR IP): Performed by: STUDENT IN AN ORGANIZED HEALTH CARE EDUCATION/TRAINING PROGRAM

## 2022-10-19 PROCEDURE — 97530 THERAPEUTIC ACTIVITIES: CPT

## 2022-10-19 PROCEDURE — 99221 1ST HOSP IP/OBS SF/LOW 40: CPT | Performed by: PHYSICAL MEDICINE & REHABILITATION

## 2022-10-19 PROCEDURE — 6360000002 HC RX W HCPCS: Performed by: STUDENT IN AN ORGANIZED HEALTH CARE EDUCATION/TRAINING PROGRAM

## 2022-10-19 PROCEDURE — 97110 THERAPEUTIC EXERCISES: CPT

## 2022-10-19 PROCEDURE — 6370000000 HC RX 637 (ALT 250 FOR IP): Performed by: EMERGENCY MEDICINE

## 2022-10-19 PROCEDURE — 97162 PT EVAL MOD COMPLEX 30 MIN: CPT

## 2022-10-19 PROCEDURE — 82947 ASSAY GLUCOSE BLOOD QUANT: CPT

## 2022-10-19 PROCEDURE — 97166 OT EVAL MOD COMPLEX 45 MIN: CPT

## 2022-10-19 PROCEDURE — 2580000003 HC RX 258: Performed by: STUDENT IN AN ORGANIZED HEALTH CARE EDUCATION/TRAINING PROGRAM

## 2022-10-19 PROCEDURE — 80048 BASIC METABOLIC PNL TOTAL CA: CPT

## 2022-10-19 PROCEDURE — 1200000000 HC SEMI PRIVATE

## 2022-10-19 PROCEDURE — 6370000000 HC RX 637 (ALT 250 FOR IP)

## 2022-10-19 PROCEDURE — 99232 SBSQ HOSP IP/OBS MODERATE 35: CPT | Performed by: NEUROLOGICAL SURGERY

## 2022-10-19 RX ORDER — MORPHINE SULFATE 4 MG/ML
4 INJECTION, SOLUTION INTRAMUSCULAR; INTRAVENOUS
Status: COMPLETED | OUTPATIENT
Start: 2022-10-19 | End: 2022-10-20

## 2022-10-19 RX ORDER — OXYCODONE HYDROCHLORIDE 5 MG/1
10 TABLET ORAL EVERY 4 HOURS PRN
Status: DISCONTINUED | OUTPATIENT
Start: 2022-10-19 | End: 2022-10-25 | Stop reason: HOSPADM

## 2022-10-19 RX ADMIN — ACETAMINOPHEN 650 MG: 325 TABLET ORAL at 09:18

## 2022-10-19 RX ADMIN — MORPHINE SULFATE 4 MG: 4 INJECTION, SOLUTION INTRAMUSCULAR; INTRAVENOUS at 15:10

## 2022-10-19 RX ADMIN — DEXAMETHASONE 4 MG: 4 TABLET ORAL at 06:16

## 2022-10-19 RX ADMIN — INSULIN LISPRO 2 UNITS: 100 INJECTION, SOLUTION INTRAVENOUS; SUBCUTANEOUS at 12:13

## 2022-10-19 RX ADMIN — GABAPENTIN 300 MG: 300 CAPSULE ORAL at 21:21

## 2022-10-19 RX ADMIN — BUSPIRONE HYDROCHLORIDE 10 MG: 10 TABLET ORAL at 09:14

## 2022-10-19 RX ADMIN — Medication 81 MG: at 09:16

## 2022-10-19 RX ADMIN — BUSPIRONE HYDROCHLORIDE 10 MG: 10 TABLET ORAL at 15:12

## 2022-10-19 RX ADMIN — CYCLOBENZAPRINE 10 MG: 10 TABLET, FILM COATED ORAL at 09:14

## 2022-10-19 RX ADMIN — MORPHINE SULFATE 4 MG: 4 INJECTION, SOLUTION INTRAMUSCULAR; INTRAVENOUS at 11:41

## 2022-10-19 RX ADMIN — METFORMIN HYDROCHLORIDE 1000 MG: 500 TABLET ORAL at 09:14

## 2022-10-19 RX ADMIN — LISINOPRIL 40 MG: 20 TABLET ORAL at 09:16

## 2022-10-19 RX ADMIN — DEXAMETHASONE 4 MG: 4 TABLET ORAL at 21:22

## 2022-10-19 RX ADMIN — DIAZEPAM 2 MG: 2 TABLET ORAL at 23:35

## 2022-10-19 RX ADMIN — ENOXAPARIN SODIUM 40 MG: 100 INJECTION SUBCUTANEOUS at 09:16

## 2022-10-19 RX ADMIN — INSULIN LISPRO 1 UNITS: 100 INJECTION, SOLUTION INTRAVENOUS; SUBCUTANEOUS at 09:11

## 2022-10-19 RX ADMIN — SODIUM CHLORIDE, PRESERVATIVE FREE 10 ML: 5 INJECTION INTRAVENOUS at 21:25

## 2022-10-19 RX ADMIN — SERTRALINE 50 MG: 50 TABLET, FILM COATED ORAL at 09:14

## 2022-10-19 RX ADMIN — METHOCARBAMOL TABLETS 750 MG: 750 TABLET, COATED ORAL at 21:21

## 2022-10-19 RX ADMIN — METFORMIN HYDROCHLORIDE 1000 MG: 500 TABLET ORAL at 17:06

## 2022-10-19 RX ADMIN — GLIPIZIDE 10 MG: 10 TABLET ORAL at 15:12

## 2022-10-19 RX ADMIN — CELECOXIB 100 MG: 100 CAPSULE ORAL at 21:22

## 2022-10-19 RX ADMIN — METHOCARBAMOL TABLETS 750 MG: 750 TABLET, COATED ORAL at 15:11

## 2022-10-19 RX ADMIN — DIAZEPAM 2 MG: 2 TABLET ORAL at 01:17

## 2022-10-19 RX ADMIN — METHOCARBAMOL TABLETS 750 MG: 750 TABLET, COATED ORAL at 17:06

## 2022-10-19 RX ADMIN — ACETAMINOPHEN 650 MG: 325 TABLET ORAL at 01:17

## 2022-10-19 RX ADMIN — ROPINIROLE HYDROCHLORIDE 2 MG: 1 TABLET, FILM COATED ORAL at 21:22

## 2022-10-19 RX ADMIN — GLIPIZIDE 10 MG: 10 TABLET ORAL at 09:14

## 2022-10-19 RX ADMIN — PANTOPRAZOLE SODIUM 40 MG: 40 TABLET, DELAYED RELEASE ORAL at 09:14

## 2022-10-19 RX ADMIN — DEXAMETHASONE 4 MG: 4 TABLET ORAL at 00:22

## 2022-10-19 RX ADMIN — METHOCARBAMOL TABLETS 750 MG: 750 TABLET, COATED ORAL at 09:15

## 2022-10-19 RX ADMIN — FUROSEMIDE 20 MG: 20 TABLET ORAL at 09:14

## 2022-10-19 RX ADMIN — DESMOPRESSIN ACETATE 40 MG: 0.2 TABLET ORAL at 21:22

## 2022-10-19 RX ADMIN — OXYCODONE 10 MG: 5 TABLET ORAL at 21:31

## 2022-10-19 RX ADMIN — GABAPENTIN 300 MG: 300 CAPSULE ORAL at 09:15

## 2022-10-19 RX ADMIN — SODIUM CHLORIDE, PRESERVATIVE FREE 10 ML: 5 INJECTION INTRAVENOUS at 09:15

## 2022-10-19 RX ADMIN — DEXAMETHASONE 4 MG: 4 TABLET ORAL at 15:13

## 2022-10-19 RX ADMIN — CELECOXIB 100 MG: 100 CAPSULE ORAL at 09:15

## 2022-10-19 RX ADMIN — DIAZEPAM 2 MG: 2 TABLET ORAL at 09:18

## 2022-10-19 RX ADMIN — OXYCODONE 10 MG: 5 TABLET ORAL at 17:06

## 2022-10-19 ASSESSMENT — PAIN DESCRIPTION - LOCATION
LOCATION: BUTTOCKS;LEG
LOCATION: LEG;BUTTOCKS

## 2022-10-19 ASSESSMENT — PAIN SCALES - GENERAL
PAINLEVEL_OUTOF10: 9
PAINLEVEL_OUTOF10: 9
PAINLEVEL_OUTOF10: 8
PAINLEVEL_OUTOF10: 2
PAINLEVEL_OUTOF10: 9
PAINLEVEL_OUTOF10: 8

## 2022-10-19 ASSESSMENT — PAIN DESCRIPTION - ORIENTATION
ORIENTATION: LEFT;OUTER;UPPER
ORIENTATION: LEFT

## 2022-10-19 ASSESSMENT — PAIN DESCRIPTION - PAIN TYPE: TYPE: CHRONIC PAIN

## 2022-10-19 ASSESSMENT — PAIN DESCRIPTION - DESCRIPTORS
DESCRIPTORS: SORE;ACHING
DESCRIPTORS: SHARP;SORE;SHOOTING

## 2022-10-19 ASSESSMENT — PAIN - FUNCTIONAL ASSESSMENT: PAIN_FUNCTIONAL_ASSESSMENT: ACTIVITIES ARE NOT PREVENTED

## 2022-10-19 ASSESSMENT — PAIN DESCRIPTION - FREQUENCY: FREQUENCY: CONTINUOUS

## 2022-10-19 ASSESSMENT — PAIN DESCRIPTION - ONSET: ONSET: ON-GOING

## 2022-10-19 NOTE — PROGRESS NOTES
Occupational Therapy  Facility/Department: [facility] 8 NEURO  Occupational Therapy Initial Assessment    Name: Iliana Blackwell  : 1960  MRN: [id number]  Date of Service: 10/19/2022    Chief Complaint   Patient presents with    Back Pain     Mainly left sided      Discharge Recommendations:   Further therapy recommended at discharge. Equipment Recommendations: Shower Chair       Patient Diagnosis(es): The encounter diagnosis was Left sciatic nerve pain. Past Medical History:  has no past medical history on file. Past Surgical History:  has no past surgical history on file. Assessment   Performance deficits / Impairments: Decreased functional mobility ; Decreased ADL status; Decreased balance;Decreased strength;Decreased high-level IADLs; Decreased Sensation; Decreased cognition; Decreased coordination  Assessment: Prior to hosptilization, pt was independent in ADLs and IADLs. Pt currently expected to be unsafe to return home without assistance due to above listed deficits. Pt would benefit from continued skilled occupational therapy services to promote participation, independence, and safety in ADLs. Prognosis: Good  Decision Making: Medium Complexity  REQUIRES OT FOLLOW-UP: Yes  Activity Tolerance  Activity Tolerance: Patient limited by pain  Activity Tolerance Comments: Significant LLE pain limited treatment. Safety Devices  Type of Devices: Nurse notified;Gait belt;Left in chair;Call light within reach  Restraints  Restraints Initially in Place: No    Plan   Occupational Therapy Plan  Times Per Week: 3-4x/wk     Restrictions  Restrictions/Precautions  Restrictions/Precautions: Up as Tolerated  Required Braces or Orthoses?: No  Other: s/p SONY injection 10/18/2022    Subjective   General  Patient assessed for rehabilitation services?: Yes  Subjective  Subjective: RN ok'd pt for therapy this PM. Pt agreeable and pleasant/cooperative throughout session.  Pt reported 9/10 pain to start session that radiated from lower back to L knee. Pt also experiencing numbness throughout LLE. General Comment  Comments: Throughout session, pt demonstrated guarded movement, as she was slow and cautious. Social/Functional History  Social/Functional History  Lives With: Family ( and 2 dtrs)  Type of Home: Apartment  Home Layout: One level  Home Access: Stairs to enter with rails  Entrance Stairs - Number of Steps: 21  Entrance Stairs - Rails: Right  Bathroom Shower/Tub: Tub/Shower unit  Bathroom Toilet: Standard  Bathroom Accessibility: Not accessible  Home Equipment: Cane, Rollator, Walker, rolling (pt reports ambulating with SPC most often)  Receives Help From: Family  ADL Assistance: Independent  Homemaking Assistance: Independent  Homemaking Responsibilities: Yes  Ambulation Assistance: Independent  Transfer Assistance: Independent  Active : No  Patient's  Info: last drove in may  Mode of Transportation: SUV, Family  Occupation: Unemployed  Leisure & Hobbies: crafting  Additional Comments: Pt reports supportive family, able to provide assistance upon discharge. Objective   Vision  Vision: Impaired  Vision Exceptions: Wears glasses at all times  Hearing  Hearing: Within functional limits       Balance  Sitting: Intact (Pt sat unsupported at EOB ~3 minutes with SBA. Pt had no LOB and able to maintain position without indications of increased pain.)  Standing: Intact (Pt statically stood ~30 seconds at EOB before engaging in functional mobility. Pt CGA d/t unsteadiness and pain.)    Functional Mobility  Overall Level of Assistance: Contact-guard assistance (Pt completed functional mobility in hospital room to simulate distances at home for ~3 minutes with cane. Pt CGA secondary to unsteadiness however, no LOB.)    AROM: Within functional limits (BUE.)  Strength:  Within functional limits (4-/5 strength in bilateral shoulders, 4/5 strength in bilateral elbows, and 4/5  strength in R hand and 4-/5  strength in L hand.)  Coordination: Decreased, functional (Decreased coordination in L hand exhibited through decreased speed, however bilateral hands remaining functional.)  Tone: Normal  Sensation: Impaired (Numbness in LLE, lateral aspect. Numbness to LUE, ulnar nerve distribution)    ADL  Feeding: Independent  Grooming: Modified independent ; Increased time to complete  UE Bathing: Stand by assistance; Increased time to complete  LE Bathing: Increased time to complete;Minimal assistance  UE Dressing: Increased time to complete;Stand by assistance  LE Dressing: Increased time to complete; Moderate assistance  Toileting: Increased time to complete;Minimal assistance    Bed mobility  Supine to Sit: Stand by assistance (VC for initation of task.)  Sit to Supine:  (Retired pt to recliner chair.)  Scooting: Stand by assistance    Transfers  Sit to stand: Contact guard assistance (Pt demo'd 1 sit to stand transfer from EOB, requiring CGA d/t unsteadiness and pain.)  Stand to sit: Contact guard assistance (Pt completed 1 stand to sit transfer with CGA d/t unsteadiness.  Pt demo'd slow descend to chair and good safety awareness.)    Cognition  Overall Cognitive Status: Exceptions  Arousal/Alertness: Appropriate responses to stimuli  Attention Span: Attends with cues to redirect  Memory: Appears intact  Insights: Fully aware of deficits  Initiation: Requires cues for some  Sequencing: Requires cues for some  Orientation  Overall Orientation Status: Within Functional Limits       Education Given To: Patient  Education Provided: Role of Therapy;Plan of Care;Transfer Training  Education Method: Verbal       AM-PAC Score  AM-[facility] Inpatient Daily Activity Raw Score: 19 (10/19/22 1445)  AM-PAC Inpatient ADL T-Scale Score : 40.22 (10/19/22 1445)  ADL Inpatient CMS 0-100% Score: 42.8 (10/19/22 1445)  ADL Inpatient CMS G-Code Modifier : CK (10/19/22 1445)    Goals  Short Term Goals  Time Frame for Short Term Goals: By discharge, pt will  Short Term Goal 1: demo UB ADLs Mod I with AE/DME PRN  Short Term Goal 2: demo LB ADLs/toileting tasks CGA with AE/DME PRN  Short Term Goal 3: demo functional transfer/mobility SUP with least restrictive AD to engage in ADLs/IADLs  Short Term Goal 4: demo increased standing activity tolerance of 15+ minutes with SUP to engage in functional tasks  Short Term Goal 5: demo/ID 2 non-pharmaceutical pain management techniques with 0 VC's       Therapy Time   Individual Concurrent Group Co-treatment   Time In 1322         Time Out 1409         Minutes 47         Timed Code Treatment Minutes: [facility]    Edited and cosigned by Yadira Villegas OTR/CARMEN

## 2022-10-19 NOTE — PROGRESS NOTES
Neurosurgery CALEB/Resident    Daily Progress Note   Chief Complaint   Patient presents with    Back Pain     Mainly left sided     10/19/2022  12:26 PM    Chart reviewed. No acute events overnight. SONY injection yesterday. Complaining of pain mostly in left buttocks. States injection helped with back pain. Vitals:    10/19/22 0010 10/19/22 0403 10/19/22 0414 10/19/22 0830   BP: 133/75  133/81 (!) 145/85   Pulse: 74 65 81 84   Resp: 19 16 16 16   Temp: 97.1 °F (36.2 °C)  98.6 °F (37 °C) 98 °F (36.7 °C)   TempSrc: Temporal  Temporal Temporal   SpO2: 99%  97% 98%   Weight:       Height:           PE:   AOx3   Motor   L deltoid 5/5; R deltoid 5/5  L biceps 5/5; R biceps 5/5  L triceps 5/5; R triceps 5/5  L intrinsics 5/5; R intrinsics 5/5      L iliopsoas 5/5 , R iliopsoas 5/5  L quadriceps 5/5; R quadriceps 5/5  L Dorsiflexion 4+/5; R dorsiflexion 5/5  L Plantarflexion 5/5; R plantarflexion 5/5  L EHL 4+/5; R EHL 5/5      Lab Results   Component Value Date    WBC 13.3 (H) 10/14/2022    HGB 13.5 10/14/2022    HCT 42.1 10/14/2022     10/14/2022     10/14/2022    K 4.5 10/14/2022     10/14/2022    CREATININE 0.83 10/14/2022    BUN 15 10/14/2022    CO2 25 10/14/2022    INR 0.9 10/14/2022           A/P  65 y.o. female who presents with lumbar disc disease with radiculopathy and S1     - No neurosurgical interventions needed. Will have patient follow up with Dr Henry Husain at [facility] pain management for piriformis syndrome   - continue oral steroids wean off over 1 week  - follow up Dr Amanda Martin 4-6 weeks       Please contact neurosurgery with any changes in patients neurologic status.        Junaid De La Vega CNP  10/19/22  12:26 PM

## 2022-10-19 NOTE — CONSULTS
Physical Medicine & Rehabilitation  Consult Note      Admitting Physician: Warner Landau, MD    Primary Care Provider: No primary care provider on file. Reason for Consult:  Acute Inpatient Rehabilitation    Chief Complaint: Back pain    History of Present Illness:  Referring Provider is requesting an evaluation for appropriate placement upon discharge from acute care. Ms. Reyna Packer is a 65 y.o. right handed female who was admitted to [facility] on 10/14/2022 with Back Pain (Mainly left sided)    52-year-old female presenting with headache and left sciatic pain. Start p.o. medications as outpatient surgery on Tuesday. Please admissions to [facility] for similar complaints. Was to see pain management per neurosurgery    Per medicine-neurosurgery planning to take patient for epidural spinal injection and reassess postprocedure patient amenable to seeing pain management at [facility]. Consider steroid taper noted all neurosurgery canceled due to patient back pain as priority, noted a possible piriformis syndrome    Neurosurgery-lumbar disc disease with radiculopathy and S1, caudal SONY injection today, neurosurgical intervention will follow with pain management [facility] continue steroids and taper over 1 week    Radiology:  MRI PELVIS W WO CONTRAST    Result Date: 10/17/2022  1. Slight asymmetric atrophy of the left-sided piriformis muscle which can be seen with left-sided piriformis syndrome. 2. Mild to moderate nonspecific edema in the right gluteus julissa muscle. However, no significant gluteal muscular atrophy. 3. Mild nonspecific edema in the posterior left obturator internus muscle. 4. No acute fracture or dislocation. No femoral head AVN. 5. Mild nonspecific edema in the subcutaneous fat at the lateral aspect of the right hip/thigh.        Review of Systems:  Constitutional: negative for anorexia, chills, fatigue, fevers, sweats and weight loss  Eyes: negative for redness and visual disturbance  Ears, nose, mouth, throat, and face: negative for earaches, sore throat and tinnitus  Respiratory: negative for cough and shortness of breath  Cardiovascular: negative for chest pain, dyspnea and palpitations  Gastrointestinal: negative for abdominal pain, change in bowel habits, constipation, nausea and vomiting  Genitourinary:negative for dysuria, frequency, hesitancy and urinary incontinence  Integument/breast: negative for pruritus and rash  Musculoskeletal:negative for muscle weakness and stiff joints  Neurological: negative for dizziness, headaches and weakness  Behavioral/Psych: negative for decreased appetite, depression and fatigue    Functional History:  PTA: Independent with all activities. Current:  PT:                                 OT:             ST:      Past Medical History:    No past medical history on file. Past Surgical History:    No past surgical history on file.     Allergies:    No Known Allergies     Current Medications:   Current Facility-Administered Medications: morphine injection 4 mg, 4 mg, IntraVENous, Q2H PRN  glucose chewable tablet 16 g, 4 tablet, Oral, PRN  dextrose bolus 10% 125 mL, 125 mL, IntraVENous, PRN **OR** dextrose bolus 10% 250 mL, 250 mL, IntraVENous, PRN  glucagon (rDNA) injection 1 mg, 1 mg, SubCUTAneous, PRN  dextrose 10 % infusion, , IntraVENous, Continuous PRN  insulin lispro (HUMALOG) injection vial 0-4 Units, 0-4 Units, SubCUTAneous, TID WC  insulin lispro (HUMALOG) injection vial 0-4 Units, 0-4 Units, SubCUTAneous, Nightly  dexamethasone (DECADRON) tablet 4 mg, 4 mg, Oral, 4 times per day **FOLLOWED BY** dexamethasone (DECADRON) tablet 4 mg, 4 mg, Oral, 3 times per day **FOLLOWED BY** [START ON 10/21/2022] dexamethasone (DECADRON) tablet 4 mg, 4 mg, Oral, 2 times per day **FOLLOWED BY** [START ON 10/24/2022] dexamethasone (DECADRON) tablet 4 mg, 4 mg, Oral, Daily **FOLLOWED BY** [START ON 10/26/2022] dexamethasone (DECADRON) tablet 2 mg, 2 mg, Oral, Daily  albuterol sulfate HFA (PROVENTIL;VENTOLIN;PROAIR) 108 (90 Base) MCG/ACT inhaler 2 puff, 2 puff, Inhalation, Q6H PRN  aspirin EC tablet 81 mg, 81 mg, Oral, Daily  atorvastatin (LIPITOR) tablet 40 mg, 40 mg, Oral, Nightly  busPIRone (BUSPAR) tablet 10 mg, 10 mg, Oral, BID  celecoxib (CELEBREX) capsule 100 mg, 100 mg, Oral, BID  diazePAM (VALIUM) tablet 2 mg, 2 mg, Oral, Q6H PRN  [START ON 10/20/2022] Dulaglutide SOPN 1.5 mg, 1.5 mg, SubCUTAneous, Weekly  furosemide (LASIX) tablet 20 mg, 20 mg, Oral, Daily  gabapentin (NEURONTIN) capsule 300 mg, 300 mg, Oral, BID  glipiZIDE (GLUCOTROL) tablet 10 mg, 10 mg, Oral, BID AC  lisinopril (PRINIVIL;ZESTRIL) tablet 40 mg, 40 mg, Oral, Daily  pantoprazole (PROTONIX) tablet 40 mg, 40 mg, Oral, Daily  rOPINIRole (REQUIP) tablet 2 mg, 2 mg, Oral, Nightly  sertraline (ZOLOFT) tablet 50 mg, 50 mg, Oral, Daily  metFORMIN (GLUCOPHAGE) tablet 1,000 mg, 1,000 mg, Oral, BID WC  methocarbamol (ROBAXIN) tablet 750 mg, 750 mg, Oral, 4x Daily  sodium chloride flush 0.9 % injection 5-40 mL, 5-40 mL, IntraVENous, 2 times per day  sodium chloride flush 0.9 % injection 5-40 mL, 5-40 mL, IntraVENous, PRN  0.9 % sodium chloride infusion, 25 mL, IntraVENous, PRN  potassium chloride (KLOR-CON M) extended release tablet 40 mEq, 40 mEq, Oral, PRN **OR** potassium bicarb-citric acid (EFFER-K) effervescent tablet 40 mEq, 40 mEq, Oral, PRN **OR** potassium chloride 10 mEq/100 mL IVPB (Peripheral Line), 10 mEq, IntraVENous, PRN  enoxaparin (LOVENOX) injection 40 mg, 40 mg, SubCUTAneous, Daily  ondansetron (ZOFRAN) injection 4 mg, 4 mg, IntraVENous, Q4H PRN  polyethylene glycol (GLYCOLAX) packet 17 g, 17 g, Oral, Daily PRN  acetaminophen (TYLENOL) tablet 650 mg, 650 mg, Oral, Q6H PRN **OR** acetaminophen (TYLENOL) suppository 650 mg, 650 mg, Rectal, Q6H PRN  morphine injection 4 mg, 4 mg, IntraVENous, Once    Social History:  Social History     Socioeconomic History    Marital status: Single Spouse name: Not on file    Number of children: Not on file    Years of education: Not on file    Highest education level: Not on file   Occupational History    Not on file   Tobacco Use    Smoking status: Not on file    Smokeless tobacco: Not on file   Substance and Sexual Activity    Alcohol use: Not on file    Drug use: Not on file    Sexual activity: Not on file   Other Topics Concern    Not on file   Social History Narrative    Not on file     Social Determinants of Health     Financial Resource Strain: Not on file   Food Insecurity: Not on file   Transportation Needs: Not on file   Physical Activity: Not on file   Stress: Not on file   Social Connections: Not on file   Intimate Partner Violence: Not on file   Housing Stability: Not on file         Family History:   No family history on file. Physical Exam:    BP (!) 145/85   Pulse 84   Temp 98 °F (36.7 °C) (Temporal)   Resp 16   Ht 5' 3\" (1.6 m)   Wt 185 lb (83.9 kg)   SpO2 98%   BMI 32.77 kg/m²     General appearance: alert, appears stated age, cooperative, and no distress  HEENT: Normocephalic, without obvious abnormality, atraumatic               Eyes: conjunctivae clear. Throat: tongue normal.               Neck:  symmetrical, trachea midline. Pulm: clear to auscultation bilaterally. Cardiac: regular rate and rhythm, no murmur. Abdomen: soft, non-tender; bowel sounds normal.  MSK: extremities normal, atraumatic, no edema, normal tone. ROM: Functional range of motion right upper and lower extremity and distal left lower extremity limited proximal left lower extremity. Mental status/Psych: Alert, orientedX3, thought content appropriate. Year, president and location, follows commands, names object  Skin:     Neuro:      Sensory: Intact in BUE and BLE to soft and pin sensation. Station decreased medial aspect left hand, questionable decreased left lower extremity  Motor: Muscle tone and bulk are normal bilaterally.  No pronator drift. 4 my/5 left intrinsics otherwise 5/5 upper extremities, right upper and lower extremity 5/5 left lower extremity distally 5/5 proximally limited due to pain      Coordination: finger to nose normal bilaterally. Diagnostics:  CBC   Lab Results   Component Value Date/Time    WBC 13.3 10/14/2022 07:54 PM    RBC 4.91 10/14/2022 07:54 PM    HGB 13.5 10/14/2022 07:54 PM    HCT 42.1 10/14/2022 07:54 PM    MCV 85.7 10/14/2022 07:54 PM    RDW 14.2 10/14/2022 07:54 PM     10/14/2022 07:54 PM     BMP    Lab Results   Component Value Date/Time     10/14/2022 07:54 PM    K 4.5 10/14/2022 07:54 PM     10/14/2022 07:54 PM    CO2 25 10/14/2022 07:54 PM    BUN 15 10/14/2022 07:54 PM     Uric Acid  No components found for: URIC  VITAMIN B12 No components found for: B12  PT/INR  No results found for: PTINR      Impression: Ms. Brock Leung is a 65 y.o. female with a history of Sciatica    Back pain, radiculopathy possible piriformis syndrome-had epidural injection by neurosurgery 10/18-notes minimal improvement-Decadron taper,  Pain-Celebrex, IV morphine Valium, Neurontin, Robaxin  Ulnar surgery on hold was planned for surgery this week  Hypertension/hyperlipidemia-Norvasc, Lipitor, Lasix, Zestril  Depression-BuSpar, Zoloft  Diabetes-dulaglutide, glipizide metformin      Recommendations:  1. Diagnosis: Back pain possible piriformis syndrome  2. Therapy: Pending  3. Medical  Necessity: Above  4. Support: Clarify  5. Rehab recommendation: To follow therapy evaluations    Used IV morphine yesterday 3 times, needs to be off IV pain medication    6. DVT proph: Lovenox    It was my pleasure to evaluate Ai De Santiago today. Please call with questions. Srinivasan Kwon. Gisella Baldwin MD          This note is created with the assistance of a speech recognition program.  While intending to generate a document that actually reflects the content of the visit, the document can still have some errors including those of syntax and sound a like substitutions which may escape proof reading.   In such instances, actual meaning can be extrapolated by contextual diversion

## 2022-10-19 NOTE — PROGRESS NOTES
Physical Therapy  Facility/Department: [address] NEURO  Physical Therapy Initial Assessment    Name: Dale Reed  : 1960  MRN: [id number]  Date of Service: 10/19/2022  Chief Complaint   Patient presents with    Back Pain     Mainly left sided       Discharge Recommendations: Further therapy recommended at discharge. PT Equipment Recommendations  Equipment Needed: No (pt reports owning RW, cane, rollator)      Patient Diagnosis(es): The encounter diagnosis was Left sciatic nerve pain. Past Medical History:  has no past medical history on file. Past Surgical History:  has no past surgical history on file. Assessment   Body Structures, Functions, Activity Limitations Requiring Skilled Therapeutic Intervention: Decreased functional mobility ; Decreased endurance;Decreased strength;Decreased balance; Increased pain  Assessment: The pt performed bed mobility with SBA, transfers with CGA and ambulated 20ft with SPC and CGA. Recommend continued PT to address sciatic pain, piriformis tightness and improve functional mobility.   Therapy Prognosis: Good  Decision Making: Medium Complexity  Requires PT Follow-Up: Yes  Activity Tolerance  Activity Tolerance: Patient tolerated treatment well     Plan   Physcial Therapy Plan  General Plan: 3-5 times per week  Current Treatment Recommendations: Strengthening, ROM, Balance training, Functional mobility training, Transfer training, Endurance training, Therapeutic activities, Gait training, Stair training, Equipment evaluation, education, & procurement, Patient/Caregiver education & training, Safety education & training, Home exercise program  Safety Devices  Type of Devices: Nurse notified, Gait belt, Left in chair, Call light within reach  Restraints  Restraints Initially in Place: No     Restrictions  Restrictions/Precautions  Restrictions/Precautions: Up as Tolerated  Required Braces or Orthoses?: No     Subjective   General  Patient assessed for rehabilitation services?: Yes  Response To Previous Treatment: Not applicable  Family / Caregiver Present: No  Follows Commands: Within Functional Limits  Subjective  Subjective: RN and pt agreeable to PT. Pt supine in bed upon arrival, pleasant and cooperative throughout. Pt reports L sciatic pain 9/10         Social/Functional History  Social/Functional History  Lives With: Family ( and 2 dtrs)  Type of Home: Apartment  Home Layout: One level  Home Access: Stairs to enter with rails  Entrance Stairs - Number of Steps: 21  Entrance Stairs - Rails: Right  Bathroom Shower/Tub: Tub/Shower unit  Bathroom Toilet: Standard  Bathroom Accessibility: Not accessible  Home Equipment: Cane, Rollator, Walker, rolling (pt reports ambulating with SPC most often)  Receives Help From: Family  ADL Assistance: Independent  Homemaking Assistance: Independent  Homemaking Responsibilities: Yes  Ambulation Assistance: Independent  Transfer Assistance: Independent  Active : No  Patient's  Info: last drove in may  Mode of Transportation: SUV, Family  Occupation: Unemployed  Leisure & Hobbies: crafting  Additional Comments: Pt reports supportive family, able to provide assistance upon discharge.   Vision/Hearing  Vision  Vision: Impaired  Vision Exceptions: Wears glasses at all times  Hearing  Hearing: Within functional limits    Cognition   Orientation  Overall Orientation Status: Within Functional Limits  Cognition  Overall Cognitive Status: Exceptions  Arousal/Alertness: Appropriate responses to stimuli  Attention Span: Attends with cues to redirect  Initiation: Does not require cues  Sequencing: Requires cues for some       Gross Assessment  Tone: Normal  Sensation: Impaired (numbness extending from L hip to lateral LE and 4th-5th toes; numbness in LUE ulnar nerve distribution- pending cubital tunnel release)     Joint Mobility  Spine: WFL  ROM RLE: WFL  ROM LLE: WFL  ROM RUE: WFL  ROM LUE: WFL  Strength RLE  Strength RLE: WFL  Strength LLE  Strength LLE: WFL  Strength RUE  Strength RUE: WFL  Comment: formally assessed by OT  Strength LUE  Strength LUE: WFL  Comment: formally assessed by OT       Bed mobility  Supine to Sit: Stand by assistance  Sit to Supine:  (retired to bedside chair following mobility)  Scooting: Stand by assistance  Bed Mobility Comments: pt demonstrated log roll technique appropriately, HOB elevated ~20 degrees  Transfers  Sit to Stand: Contact guard assistance  Stand to Sit: Contact guard assistance  Comment: CGA for safety, steady, no LOB  Ambulation  Surface: Level tile  Device: Single point cane  Assistance: Contact guard assistance  Quality of Gait: steady, cautious  Gait Deviations: Slow Kaelyn;Decreased step height;Decreased step length;Decreased head and trunk rotation  Distance: 20ft  Stairs/Curb  Stairs?: No     Balance  Posture: Good  Sitting - Static: Good  Sitting - Dynamic: Good  Standing - Static: Good;-  Standing - Dynamic: Fair;+  Comments: standing balance assessed with SPC         Exercise  Performed supine figure 4 LLE stretching, incrementally increasing depth of stretch with manual pressure to patient's tolerance for a total of 3 minutes  Performed manual trigger point release to pt's L piriformis for 2 minutes in sidelying    AM-PAC Score  AM-PAC Inpatient Mobility Raw Score : 19 (10/19/22 1440)  AM-PAC Inpatient T-Scale Score : 45.44 (10/19/22 1440)  Mobility Inpatient CMS 0-100% Score: 41.77 (10/19/22 1440)  Mobility Inpatient CMS G-Code Modifier : CK (10/19/22 1440)   Goals  Short Term Goals  Time Frame for Short Term Goals: 10 visits  Short Term Goal 1: Perform bed mobility and functional transfers Mod I  Short Term Goal 2: Perform HEP independently  Short Term Goal 3: Ambulate 300ft with SPC or RW and supervision  Short Term Goal 4: Ascend/descend 20 steps with HR and supervision       Education  Patient Education  Education Given To: Patient  Education Provided: Role of Therapy;Plan of Care;Home Exercise Program  Education Provided Comments: increased time spent educating pt on supine/reclined piriformis stretch for LLE  Education Method: Verbal;Demonstration  Barriers to Learning: None  Education Outcome: Verbalized understanding;Demonstrated understanding      Therapy Time   Individual Concurrent Group Co-treatment   Time In 1327         Time Out 1410         Minutes 43         Timed Code Treatment Minutes: 23 Minutes; co-eval with OT       Krzysztof Montague, PT

## 2022-10-19 NOTE — PROGRESS NOTES
OBS/CDU   Attending NOTE      Patients PCP is: No primary care provider on file. SUBJECTIVE      Patient still frustrated with pain and ongoing difficulty with comfort. Patient complaining of radiculopathy which is not really being helped much with the medications. Patient states baclofen more effective than Flexeril. We will try benzodiazepine as muscle relaxant as well. Patient did get relief with Valium previously. Patient with prolonged hospitalization secondary to discomfort. Patient may be candidate for Christianity and inpatient acute rehab. We will have PT OT reevaluate. PMNR consulted. PHYSICAL EXAM      General: NAD, AO X 3  Heent: EMOI, PERRL  Neck: SUPPLE, NO JVD  Cardiovascular: RRR, S1S2  Pulmonary: CTAB, NO SOB  Abdomen: SOFT, NTTP, ND, +BS  Extremities: +2/4 PULSES DISTAL, NO SWELLING  Neuro / Psych: NO NUMBNESS OR TINGLING, MENTATION AT BASELINE    PERTINENT TEST /EXAMS      I have reviewed all available laboratory results.     MEDICATIONS CURRENT   morphine injection 4 mg, Q2H PRN  glucose chewable tablet 16 g, PRN  dextrose bolus 10% 125 mL, PRN   Or  dextrose bolus 10% 250 mL, PRN  glucagon (rDNA) injection 1 mg, PRN  dextrose 10 % infusion, Continuous PRN  insulin lispro (HUMALOG) injection vial 0-4 Units, TID WC  insulin lispro (HUMALOG) injection vial 0-4 Units, Nightly  dexamethasone (DECADRON) tablet 4 mg, 4 times per day   Followed by  dexamethasone (DECADRON) tablet 4 mg, 3 times per day   Followed by  [START ON 10/21/2022] dexamethasone (DECADRON) tablet 4 mg, 2 times per day   Followed by  [START ON 10/24/2022] dexamethasone (DECADRON) tablet 4 mg, Daily   Followed by  Ciara London ON 10/26/2022] dexamethasone (DECADRON) tablet 2 mg, Daily  albuterol sulfate HFA (PROVENTIL;VENTOLIN;PROAIR) 108 (90 Base) MCG/ACT inhaler 2 puff, Q6H PRN  aspirin EC tablet 81 mg, Daily  atorvastatin (LIPITOR) tablet 40 mg, Nightly  busPIRone (BUSPAR) tablet 10 mg, BID  celecoxib (CELEBREX) capsule 100 clinical improvement    --  Grady aWtkins MD  Emergency Medicine Attending Physician     This dictation was generated by voice recognition computer software. Although all attempts are made to edit the dictation for accuracy, there may be errors in the transcription that are not intended.

## 2022-10-19 NOTE — PROGRESS NOTES
Brief Note:    Patient seen by provider this afternoon. Patient in no acute distress and working with physical therapy and Occupational Therapy at bedside. Patient rates pain as 8 out of 10 but appears comfortable. Informed patient that she will be examined by the physical medicine rehabilitation team.  No other complaints at this time. We will follow-up on evaluation recommendations of PM&R. The sheath is inserted into the right internal jugular vein.  None known

## 2022-10-19 NOTE — PLAN OF CARE
Problem: Pain  Goal: Verbalizes/displays adequate comfort level or baseline comfort level  10/19/2022 0338 by Felice Monique RN  Outcome: Progressing  10/18/2022 1825 by Ildefonso Mckinnon RN  Outcome: Progressing     Problem: Safety - Adult  Goal: Free from fall injury  10/19/2022 0338 by Felice Monique RN  Outcome: Progressing  10/18/2022 1825 by Ildfeonso Mckinnon RN  Outcome: Progressing     Problem: Discharge Planning  Goal: Discharge to home or other facility with appropriate resources  10/19/2022 0338 by Felice Monique RN  Outcome: Progressing  10/18/2022 1825 by Ildefonso Mckinnon RN  Outcome: Progressing

## 2022-10-20 LAB
GLUCOSE BLD-MCNC: 233 MG/DL (ref 65–105)
GLUCOSE BLD-MCNC: 236 MG/DL (ref 65–105)
GLUCOSE BLD-MCNC: 249 MG/DL (ref 65–105)
GLUCOSE BLD-MCNC: 291 MG/DL (ref 65–105)

## 2022-10-20 PROCEDURE — 6370000000 HC RX 637 (ALT 250 FOR IP): Performed by: EMERGENCY MEDICINE

## 2022-10-20 PROCEDURE — 6360000002 HC RX W HCPCS: Performed by: REGISTERED NURSE

## 2022-10-20 PROCEDURE — 6360000002 HC RX W HCPCS: Performed by: EMERGENCY MEDICINE

## 2022-10-20 PROCEDURE — 1200000000 HC SEMI PRIVATE

## 2022-10-20 PROCEDURE — 6360000002 HC RX W HCPCS: Performed by: STUDENT IN AN ORGANIZED HEALTH CARE EDUCATION/TRAINING PROGRAM

## 2022-10-20 PROCEDURE — 82947 ASSAY GLUCOSE BLOOD QUANT: CPT

## 2022-10-20 PROCEDURE — 2580000003 HC RX 258: Performed by: STUDENT IN AN ORGANIZED HEALTH CARE EDUCATION/TRAINING PROGRAM

## 2022-10-20 PROCEDURE — 99232 SBSQ HOSP IP/OBS MODERATE 35: CPT | Performed by: PHYSICAL MEDICINE & REHABILITATION

## 2022-10-20 PROCEDURE — 6370000000 HC RX 637 (ALT 250 FOR IP)

## 2022-10-20 PROCEDURE — 6370000000 HC RX 637 (ALT 250 FOR IP): Performed by: STUDENT IN AN ORGANIZED HEALTH CARE EDUCATION/TRAINING PROGRAM

## 2022-10-20 RX ADMIN — ROPINIROLE HYDROCHLORIDE 2 MG: 1 TABLET, FILM COATED ORAL at 20:56

## 2022-10-20 RX ADMIN — METHOCARBAMOL TABLETS 750 MG: 750 TABLET, COATED ORAL at 16:34

## 2022-10-20 RX ADMIN — ACETAMINOPHEN 650 MG: 325 TABLET ORAL at 20:56

## 2022-10-20 RX ADMIN — METHOCARBAMOL TABLETS 750 MG: 750 TABLET, COATED ORAL at 08:43

## 2022-10-20 RX ADMIN — POLYETHYLENE GLYCOL 3350 17 G: 17 POWDER, FOR SOLUTION ORAL at 23:23

## 2022-10-20 RX ADMIN — METFORMIN HYDROCHLORIDE 1000 MG: 500 TABLET ORAL at 08:42

## 2022-10-20 RX ADMIN — SERTRALINE 50 MG: 50 TABLET, FILM COATED ORAL at 08:43

## 2022-10-20 RX ADMIN — INSULIN LISPRO 1 UNITS: 100 INJECTION, SOLUTION INTRAVENOUS; SUBCUTANEOUS at 11:48

## 2022-10-20 RX ADMIN — SODIUM CHLORIDE, PRESERVATIVE FREE 10 ML: 5 INJECTION INTRAVENOUS at 21:14

## 2022-10-20 RX ADMIN — DIAZEPAM 2 MG: 2 TABLET ORAL at 23:24

## 2022-10-20 RX ADMIN — Medication 81 MG: at 08:43

## 2022-10-20 RX ADMIN — ENOXAPARIN SODIUM 40 MG: 100 INJECTION SUBCUTANEOUS at 08:43

## 2022-10-20 RX ADMIN — METHOCARBAMOL TABLETS 750 MG: 750 TABLET, COATED ORAL at 20:56

## 2022-10-20 RX ADMIN — DEXAMETHASONE 4 MG: 4 TABLET ORAL at 20:58

## 2022-10-20 RX ADMIN — GLIPIZIDE 10 MG: 10 TABLET ORAL at 15:43

## 2022-10-20 RX ADMIN — MORPHINE SULFATE 4 MG: 4 INJECTION, SOLUTION INTRAMUSCULAR; INTRAVENOUS at 23:24

## 2022-10-20 RX ADMIN — METFORMIN HYDROCHLORIDE 1000 MG: 500 TABLET ORAL at 16:34

## 2022-10-20 RX ADMIN — PANTOPRAZOLE SODIUM 40 MG: 40 TABLET, DELAYED RELEASE ORAL at 08:43

## 2022-10-20 RX ADMIN — MORPHINE SULFATE 4 MG: 4 INJECTION, SOLUTION INTRAMUSCULAR; INTRAVENOUS at 16:35

## 2022-10-20 RX ADMIN — INSULIN LISPRO 1 UNITS: 100 INJECTION, SOLUTION INTRAVENOUS; SUBCUTANEOUS at 08:44

## 2022-10-20 RX ADMIN — LISINOPRIL 40 MG: 20 TABLET ORAL at 08:43

## 2022-10-20 RX ADMIN — BUSPIRONE HYDROCHLORIDE 10 MG: 10 TABLET ORAL at 08:43

## 2022-10-20 RX ADMIN — CELECOXIB 100 MG: 100 CAPSULE ORAL at 08:43

## 2022-10-20 RX ADMIN — GABAPENTIN 300 MG: 300 CAPSULE ORAL at 20:56

## 2022-10-20 RX ADMIN — BUSPIRONE HYDROCHLORIDE 10 MG: 10 TABLET ORAL at 15:43

## 2022-10-20 RX ADMIN — SODIUM CHLORIDE, PRESERVATIVE FREE 10 ML: 5 INJECTION INTRAVENOUS at 08:50

## 2022-10-20 RX ADMIN — GABAPENTIN 300 MG: 300 CAPSULE ORAL at 08:43

## 2022-10-20 RX ADMIN — GLIPIZIDE 10 MG: 10 TABLET ORAL at 06:53

## 2022-10-20 RX ADMIN — OXYCODONE 10 MG: 5 TABLET ORAL at 15:43

## 2022-10-20 RX ADMIN — METHOCARBAMOL TABLETS 750 MG: 750 TABLET, COATED ORAL at 13:46

## 2022-10-20 RX ADMIN — DIAZEPAM 2 MG: 2 TABLET ORAL at 11:48

## 2022-10-20 RX ADMIN — CELECOXIB 100 MG: 100 CAPSULE ORAL at 20:56

## 2022-10-20 RX ADMIN — INSULIN LISPRO 1 UNITS: 100 INJECTION, SOLUTION INTRAVENOUS; SUBCUTANEOUS at 16:35

## 2022-10-20 RX ADMIN — FUROSEMIDE 20 MG: 20 TABLET ORAL at 08:43

## 2022-10-20 RX ADMIN — OXYCODONE 10 MG: 5 TABLET ORAL at 20:55

## 2022-10-20 RX ADMIN — MORPHINE SULFATE 4 MG: 4 INJECTION, SOLUTION INTRAMUSCULAR; INTRAVENOUS at 20:54

## 2022-10-20 RX ADMIN — OXYCODONE 10 MG: 5 TABLET ORAL at 11:25

## 2022-10-20 RX ADMIN — MORPHINE SULFATE 4 MG: 4 INJECTION, SOLUTION INTRAMUSCULAR; INTRAVENOUS at 04:11

## 2022-10-20 RX ADMIN — DEXAMETHASONE 4 MG: 4 TABLET ORAL at 06:53

## 2022-10-20 RX ADMIN — DEXAMETHASONE 4 MG: 4 TABLET ORAL at 13:47

## 2022-10-20 RX ADMIN — DESMOPRESSIN ACETATE 40 MG: 0.2 TABLET ORAL at 20:56

## 2022-10-20 ASSESSMENT — PAIN DESCRIPTION - ORIENTATION
ORIENTATION: LEFT
ORIENTATION: LEFT
ORIENTATION: LEFT;OUTER;UPPER
ORIENTATION: LEFT;OUTER;UPPER
ORIENTATION: LEFT

## 2022-10-20 ASSESSMENT — PAIN DESCRIPTION - LOCATION
LOCATION: BUTTOCKS;LEG
LOCATION: BUTTOCKS;LEG
LOCATION: BACK
LOCATION: BUTTOCKS
LOCATION: BUTTOCKS;LEG
LOCATION: BUTTOCKS;LEG

## 2022-10-20 ASSESSMENT — PAIN SCALES - GENERAL
PAINLEVEL_OUTOF10: 8
PAINLEVEL_OUTOF10: 7
PAINLEVEL_OUTOF10: 8
PAINLEVEL_OUTOF10: 9
PAINLEVEL_OUTOF10: 8
PAINLEVEL_OUTOF10: 9
PAINLEVEL_OUTOF10: 8

## 2022-10-20 ASSESSMENT — PAIN DESCRIPTION - PAIN TYPE: TYPE: ACUTE PAIN;CHRONIC PAIN

## 2022-10-20 ASSESSMENT — PAIN DESCRIPTION - DESCRIPTORS
DESCRIPTORS: SHARP;SORE
DESCRIPTORS: SHARP;SORE
DESCRIPTORS: ACHING
DESCRIPTORS: SHARP;DISCOMFORT
DESCRIPTORS: SHARP;SORE

## 2022-10-20 ASSESSMENT — PAIN DESCRIPTION - FREQUENCY: FREQUENCY: CONTINUOUS

## 2022-10-20 ASSESSMENT — PAIN DESCRIPTION - ONSET: ONSET: AWAKENED FROM SLEEP

## 2022-10-20 ASSESSMENT — PAIN - FUNCTIONAL ASSESSMENT: PAIN_FUNCTIONAL_ASSESSMENT: PREVENTS OR INTERFERES SOME ACTIVE ACTIVITIES AND ADLS

## 2022-10-20 NOTE — PROGRESS NOTES
OBS/CDU   Resident NOTE      Patients PCP is: No primary care provider on file. SUBJECTIVE      No acute events overnight. Patient sitting comfortably at the side of the bed eating breakfast.  Tolerating meals. Patient reports pain at the site of the caudal epidural steroid injection has improved significantly. She still reports pain at her left piriformis. States that medications and physical therapy have helped reduce the pain but she still rates it as 8 out of 10. Patient thinks that if she can get it below a 7 she would feel comfortable enough. Patient amenable to a soft tissue manipulative treatment after discussion with the provider. PHYSICAL EXAM      General: NAD, AO X 3  Heent: EMOI, PERRL  Neck: SUPPLE, NO JVD  Cardiovascular: RRR, S1S2  Pulmonary: CTAB, NO SOB  Abdomen: SOFT, NTTP, ND, +BS  Extremities: +2/4 PULSES DISTAL, NO SWELLING  Neuro / Psych: NO NUMBNESS OR TINGLING, MENTATION AT BASELINE    PERTINENT TEST /EXAMS      I have reviewed all available laboratory results.     MEDICATIONS CURRENT   morphine injection 4 mg, Q2H PRN  oxyCODONE (ROXICODONE) immediate release tablet 10 mg, Q4H PRN  glucose chewable tablet 16 g, PRN  dextrose bolus 10% 125 mL, PRN   Or  dextrose bolus 10% 250 mL, PRN  glucagon (rDNA) injection 1 mg, PRN  dextrose 10 % infusion, Continuous PRN  insulin lispro (HUMALOG) injection vial 0-4 Units, TID WC  insulin lispro (HUMALOG) injection vial 0-4 Units, Nightly  dexamethasone (DECADRON) tablet 4 mg, 3 times per day   Followed by  [START ON 10/21/2022] dexamethasone (DECADRON) tablet 4 mg, 2 times per day   Followed by  [START ON 10/24/2022] dexamethasone (DECADRON) tablet 4 mg, Daily   Followed by  Miguel Nails ON 10/26/2022] dexamethasone (DECADRON) tablet 2 mg, Daily  albuterol sulfate HFA (PROVENTIL;VENTOLIN;PROAIR) 108 (90 Base) MCG/ACT inhaler 2 puff, Q6H PRN  aspirin EC tablet 81 mg, Daily  atorvastatin (LIPITOR) tablet 40 mg, Nightly  busPIRone (BUSPAR) tablet 10 mg, BID  celecoxib (CELEBREX) capsule 100 mg, BID  diazePAM (VALIUM) tablet 2 mg, Q6H PRN  Dulaglutide SOPN 1.5 mg, Weekly  furosemide (LASIX) tablet 20 mg, Daily  gabapentin (NEURONTIN) capsule 300 mg, BID  glipiZIDE (GLUCOTROL) tablet 10 mg, BID AC  lisinopril (PRINIVIL;ZESTRIL) tablet 40 mg, Daily  pantoprazole (PROTONIX) tablet 40 mg, Daily  rOPINIRole (REQUIP) tablet 2 mg, Nightly  sertraline (ZOLOFT) tablet 50 mg, Daily  metFORMIN (GLUCOPHAGE) tablet 1,000 mg, BID WC  methocarbamol (ROBAXIN) tablet 750 mg, 4x Daily  sodium chloride flush 0.9 % injection 5-40 mL, 2 times per day  sodium chloride flush 0.9 % injection 5-40 mL, PRN  0.9 % sodium chloride infusion, PRN  potassium chloride (KLOR-CON M) extended release tablet 40 mEq, PRN   Or  potassium bicarb-citric acid (EFFER-K) effervescent tablet 40 mEq, PRN   Or  potassium chloride 10 mEq/100 mL IVPB (Peripheral Line), PRN  enoxaparin (LOVENOX) injection 40 mg, Daily  ondansetron (ZOFRAN) injection 4 mg, Q4H PRN  polyethylene glycol (GLYCOLAX) packet 17 g, Daily PRN  acetaminophen (TYLENOL) tablet 650 mg, Q6H PRN   Or  acetaminophen (TYLENOL) suppository 650 mg, Q6H PRN  morphine injection 4 mg, Once      All medication charted and reviewed. CONSULTS      IP CONSULT TO NEUROSURGERY  IP CONSULT TO FAMILY MEDICINE  IP CONSULT TO PHYSICAL MEDICINE REHAB    ASSESSMENT/PLAN       Ayan Sanchez is a 65 y.o. female who presents with       Intractable back pain. Evaluated by neurosurgery. Piriformis syndrome   Patient on steroid taper. PT OT evaluation. PT OT evaluation recommends physical therapy several times per week. No recommended PM&R evaluation. Aggressive supportive therapy. Patient not considered a surgical candidate. For reassessment after more aggressive analgesia today. Patient frustrated and tearful due to ongoing symptoms. Patient with some level of anxiety and depression.   May need to consider psychiatric evaluation as well.  Continue home medications and pain control  Monitor vitals, labs, and imaging  DISPO: pending consults and clinical improvement    --  Georgiana Sprague DO  Emergency Medicine resident physician     This dictation was generated by voice recognition computer software. Although all attempts are made to edit the dictation for accuracy, there may be errors in the transcription that are not intended.

## 2022-10-20 NOTE — PROGRESS NOTES
Physical Medicine & Rehabilitation  Progress Note    10/20/2022 10:14 AM     CC: Ambulatory and ADL dysfunction due to back pain, sciatica, piriformis syndrome and left upper extremity ulnar neuropathy    Subjective:   Continues with pain, participate in therapies. Concern home discharge due to 20+ steps and feels weak. Denies difficulty with bowels or bladder    ROS:  Denies fevers, chills, sweats. No chest pain, palpitations, lightheadedness. Denies coughing, wheezing or shortness of breath. Denies abdominal pain, nausea, diarrhea or constipation. No new areas of joint pain. Denies new areas of numbness or weakness. Denies new anxiety or depression issues. No new skin problems.     Rehabilitation:   PT:  Restrictions/Precautions: Up as Tolerated   Transfers  Sit to Stand: Contact guard assistance  Stand to Sit: Contact guard assistance  Comment: CGA for safety, steady, no LOB  Ambulation  Surface: Level tile  Device: Single point cane  Assistance: Contact guard assistance  Quality of Gait: steady, cautious  Gait Deviations: Slow Kaelyn, Decreased step height, Decreased step length, Decreased head and trunk rotation  Distance: 20ft      OT:  ADL  Feeding: Independent  Grooming: Modified independent , Increased time to complete  UE Bathing: Stand by assistance, Increased time to complete  LE Bathing: Increased time to complete, Adaptive equipment, Minimal assistance  UE Dressing: Increased time to complete, Stand by assistance  LE Dressing: Increased time to complete, Moderate assistance  Toileting: Increased time to complete, Minimal assistance                      Bed mobility  Supine to Sit: Stand by assistance (VC for initation of task.)  Sit to Supine:  (Retired pt to recliner chair.)  Scooting: Stand by assistance  Bed Mobility Comments: pt demonstrated log roll technique appropriately, HOB elevated ~20 degrees  Transfers  Sit to stand: Contact guard assistance (Pt demo'd 1 sit to stand transfer from EOB, requiring CGA d/t unsteadiness and pain.)  Stand to sit: Contact guard assistance (Pt completed 1 stand to sit transfer with CGA d/t unsteadiness. Pt demo'd slow descend to chair and good safety awareness.)                   ST:        Objective:  BP (!) 154/90   Pulse 72   Temp 97.3 °F (36.3 °C) (Temporal)   Resp 16   Ht 5' 3\" (1.6 m)   Wt 188 lb 15 oz (85.7 kg)   SpO2 97%   BMI 33.47 kg/m²  I Body mass index is 33.47 kg/m². I   Wt Readings from Last 1 Encounters:   10/19/22 188 lb 15 oz (85.7 kg)      Temp (24hrs), Av.4 °F (36.3 °C), Min:96.7 °F (35.9 °C), Max:98.1 °F (36.7 °C)         GEN: well developed, well nourished, no acute distress  HEENT: Normocephalic atraumatic, EOMI, mucous membranes pink and moist  CV: RRR, no murmurs, rubs or gallops  PULM: CTAB, no rales or rhonchi. Respirations WNL and unlabored  ABD: soft, NT, ND, +BS and equal  NEURO: A&O x3. Sensation intact to light touch. MSK: 4/5 left lower extremity 5/5 right lower extremity and upper extremities  EXTREMITIES: No calf tenderness to palpation bilaterally. No edema BLEs  SKIN: warm dry and intact with good turgor  PSYCH: appropriately interactive. Affect WNL.           Medications   Scheduled Meds:   insulin lispro  0-4 Units SubCUTAneous TID     insulin lispro  0-4 Units SubCUTAneous Nightly    dexamethasone  4 mg Oral 3 times per day    Followed by    [START ON 10/21/2022] dexamethasone  4 mg Oral 2 times per day    Followed by    [START ON 10/24/2022] dexamethasone  4 mg Oral Daily    Followed by    Della Jackson ON 10/26/2022] dexamethasone  2 mg Oral Daily    aspirin  81 mg Oral Daily    atorvastatin  40 mg Oral Nightly    busPIRone  10 mg Oral BID    celecoxib  100 mg Oral BID    dulaglutide  1.5 mg SubCUTAneous Weekly    furosemide  20 mg Oral Daily    gabapentin  300 mg Oral BID    glipiZIDE  10 mg Oral BID AC    lisinopril  40 mg Oral Daily    pantoprazole  40 mg Oral Daily    rOPINIRole  2 mg Oral Nightly    sertraline 50 mg Oral Daily    metFORMIN  1,000 mg Oral BID WC    methocarbamol  750 mg Oral 4x Daily    sodium chloride flush  5-40 mL IntraVENous 2 times per day    enoxaparin  40 mg SubCUTAneous Daily    morphine  4 mg IntraVENous Once     Continuous Infusions:   dextrose      sodium chloride       PRN Meds:.morphine, oxyCODONE, glucose, dextrose bolus **OR** dextrose bolus, glucagon (rDNA), dextrose, albuterol sulfate HFA, diazePAM, sodium chloride flush, sodium chloride, potassium chloride **OR** potassium alternative oral replacement **OR** potassium chloride, ondansetron, polyethylene glycol, acetaminophen **OR** acetaminophen     Diagnostics:     CBC: No results for input(s): WBC, RBC, HGB, HCT, MCV, RDW, PLT in the last 72 hours. BMP:   Recent Labs     10/19/22  1655      K 4.6      CO2 22   BUN 33*   CREATININE 1.08*     BNP: No results for input(s): BNP in the last 72 hours. PT/INR: No results for input(s): PROTIME, INR in the last 72 hours. APTT: No results for input(s): APTT in the last 72 hours. CARDIAC ENZYMES: No results for input(s): CKMB, CKMBINDEX, TROPONINT in the last 72 hours. Invalid input(s): CKTOTAL;3  FASTING LIPID PANEL:No results found for: CHOL, HDL, TRIG  LIVER PROFILE: No results for input(s): AST, ALT, ALB, BILIDIR, BILITOT, ALKPHOS in the last 72 hours. I/O (24Hr): Intake/Output Summary (Last 24 hours) at 10/20/2022 1014  Last data filed at 10/20/2022 0548  Gross per 24 hour   Intake --   Output 1600 ml   Net -1600 ml       Glu last 24 hour  Recent Labs     10/19/22  1116 10/19/22  1535 10/19/22  2116 10/20/22  0657   POCGLU 283* 169* 287* 249*       No results for input(s): CLARITYU, COLORU, PHUR, SPECGRAV, PROTEINU, RBCUA, BLOODU, BACTERIA, NITRU, WBCUA, LEUKOCYTESUR, YEAST, GLUCOSEU, BILIRUBINUR in the last 72 hours. MRI pelvis 10/17  Impression:        1.  Slight asymmetric atrophy of the left-sided piriformis muscle which can be   seen with left-sided piriformis syndrome. 2. Mild to moderate nonspecific edema in the right gluteus julissa muscle. However, no significant gluteal muscular atrophy. 3. Mild nonspecific edema in the posterior left obturator internus muscle. 4. No acute fracture or dislocation. No femoral head AVN. 5. Mild nonspecific edema in the subcutaneous        Type of Home: Apartment  Home Layout: One level  Home Access: Stairs to enter with rails  Entrance Stairs - Number of Steps: 21  Entrance Stairs - Rails: Right  Bathroom Shower/Tub: Tub/Shower unit  Bathroom Toilet: Standard  Bathroom Accessibility: Not accessible  Home Equipment: Cane, Rollator, Walker, rolling (pt reports ambulating with SPC most often)  Receives Help From: Family  ADL Assistance: Independent  Homemaking Assistance: Independent  Homemaking Responsibilities: Yes  Ambulation Assistance: Independent  Transfer Assistance: Independent  Active : No  Patient's  Info: last drove in may  Mode of Transportation: SUV, Family  Occupation: Unemployed  Leisure & Hobbies: crafting  Additional Comments: Pt reports supportive family, able to provide assistance upon discharge. Impression: Ms. Liang Simpson is a 65 y.o. female with a history of Sciatica     Back pain, radiculopathy possible piriformis syndrome-had epidural injection by neurosurgery 10/18-notes minimal improvement-Decadron taper,  Pain-Celebrex, IV morphine Valium, Neurontin, Robaxin, Roxicodone  Left ulnar neuropathy-ulnar surgery on hold was planned for surgery this week, rescheduled for November  Hypertension/hyperlipidemia-Norvasc, Lipitor, Lasix, Zestril  Depression-BuSpar, Zoloft  Diabetes-dulaglutide, glipizide metformin  BUN/creatinine noted to be mildly increased-consider DC Celebrex        Recommendations:  1. Diagnosis: Left lower extremity radiculopathy piriformis syndrome, left ulnar neuropathy  2.  Therapy: Ambulate 20 feet contact-guard slow javed, transfers contact-guard, however is 21 steps to get into her home, min to mod assist lower extremities, min assist toileting  3. Medical  Necessity: Above  4. Support: Clarify  5. Rehab recommendation: Currently would benefit acute inpatient rehabilitation to improve functional status work on steps, ADLs, strengthening, pain management,-patient would like [facility] rehab     Used IV morphine this a.m.-needs to be off IV pain medication participate with therapies     6. DVT proph: Lovenox     It was my pleasure to evaluate Jude De Santiago today. Please call with questions. Benton Lieberman. Sonia Steward MD       This note is created with the assistance of a speech recognition program.  While intending to generate a document that actually reflects the content of the visit, the document can still have some errors including those of syntax and sound a like substitutions which may escape proof reading.   In such instances, actual meaning can be extrapolated by contextual diversion

## 2022-10-21 LAB
GLUCOSE BLD-MCNC: 251 MG/DL (ref 65–105)
GLUCOSE BLD-MCNC: 257 MG/DL (ref 65–105)
GLUCOSE BLD-MCNC: 317 MG/DL (ref 65–105)
GLUCOSE BLD-MCNC: 336 MG/DL (ref 65–105)

## 2022-10-21 PROCEDURE — 97530 THERAPEUTIC ACTIVITIES: CPT

## 2022-10-21 PROCEDURE — 6370000000 HC RX 637 (ALT 250 FOR IP)

## 2022-10-21 PROCEDURE — 82947 ASSAY GLUCOSE BLOOD QUANT: CPT

## 2022-10-21 PROCEDURE — 6360000002 HC RX W HCPCS: Performed by: EMERGENCY MEDICINE

## 2022-10-21 PROCEDURE — 6370000000 HC RX 637 (ALT 250 FOR IP): Performed by: EMERGENCY MEDICINE

## 2022-10-21 PROCEDURE — 97116 GAIT TRAINING THERAPY: CPT

## 2022-10-21 PROCEDURE — 6360000002 HC RX W HCPCS: Performed by: STUDENT IN AN ORGANIZED HEALTH CARE EDUCATION/TRAINING PROGRAM

## 2022-10-21 PROCEDURE — 1200000000 HC SEMI PRIVATE

## 2022-10-21 PROCEDURE — 97535 SELF CARE MNGMENT TRAINING: CPT

## 2022-10-21 PROCEDURE — 2580000003 HC RX 258: Performed by: STUDENT IN AN ORGANIZED HEALTH CARE EDUCATION/TRAINING PROGRAM

## 2022-10-21 PROCEDURE — 6360000002 HC RX W HCPCS: Performed by: REGISTERED NURSE

## 2022-10-21 PROCEDURE — 97110 THERAPEUTIC EXERCISES: CPT

## 2022-10-21 PROCEDURE — 99231 SBSQ HOSP IP/OBS SF/LOW 25: CPT | Performed by: PHYSICAL MEDICINE & REHABILITATION

## 2022-10-21 PROCEDURE — 6370000000 HC RX 637 (ALT 250 FOR IP): Performed by: STUDENT IN AN ORGANIZED HEALTH CARE EDUCATION/TRAINING PROGRAM

## 2022-10-21 RX ORDER — MORPHINE SULFATE 4 MG/ML
4 INJECTION, SOLUTION INTRAMUSCULAR; INTRAVENOUS
Status: DISCONTINUED | OUTPATIENT
Start: 2022-10-21 | End: 2022-10-25 | Stop reason: HOSPADM

## 2022-10-21 RX ADMIN — BUSPIRONE HYDROCHLORIDE 10 MG: 10 TABLET ORAL at 09:19

## 2022-10-21 RX ADMIN — METHOCARBAMOL TABLETS 750 MG: 750 TABLET, COATED ORAL at 20:46

## 2022-10-21 RX ADMIN — DEXAMETHASONE 4 MG: 4 TABLET ORAL at 05:14

## 2022-10-21 RX ADMIN — SERTRALINE 50 MG: 50 TABLET, FILM COATED ORAL at 09:19

## 2022-10-21 RX ADMIN — OXYCODONE 10 MG: 5 TABLET ORAL at 05:14

## 2022-10-21 RX ADMIN — CELECOXIB 100 MG: 100 CAPSULE ORAL at 20:46

## 2022-10-21 RX ADMIN — BUSPIRONE HYDROCHLORIDE 10 MG: 10 TABLET ORAL at 15:03

## 2022-10-21 RX ADMIN — LISINOPRIL 40 MG: 20 TABLET ORAL at 09:19

## 2022-10-21 RX ADMIN — INSULIN LISPRO 2 UNITS: 100 INJECTION, SOLUTION INTRAVENOUS; SUBCUTANEOUS at 09:26

## 2022-10-21 RX ADMIN — SODIUM CHLORIDE, PRESERVATIVE FREE 10 ML: 5 INJECTION INTRAVENOUS at 20:50

## 2022-10-21 RX ADMIN — ACETAMINOPHEN 650 MG: 325 TABLET ORAL at 05:14

## 2022-10-21 RX ADMIN — METHOCARBAMOL TABLETS 750 MG: 750 TABLET, COATED ORAL at 09:19

## 2022-10-21 RX ADMIN — ENOXAPARIN SODIUM 40 MG: 100 INJECTION SUBCUTANEOUS at 09:18

## 2022-10-21 RX ADMIN — GABAPENTIN 300 MG: 300 CAPSULE ORAL at 20:46

## 2022-10-21 RX ADMIN — DEXAMETHASONE 4 MG: 4 TABLET ORAL at 13:42

## 2022-10-21 RX ADMIN — FUROSEMIDE 20 MG: 20 TABLET ORAL at 09:19

## 2022-10-21 RX ADMIN — SODIUM CHLORIDE, PRESERVATIVE FREE 10 ML: 5 INJECTION INTRAVENOUS at 09:21

## 2022-10-21 RX ADMIN — OXYCODONE 10 MG: 5 TABLET ORAL at 09:35

## 2022-10-21 RX ADMIN — INSULIN LISPRO 3 UNITS: 100 INJECTION, SOLUTION INTRAVENOUS; SUBCUTANEOUS at 12:23

## 2022-10-21 RX ADMIN — GLIPIZIDE 10 MG: 10 TABLET ORAL at 09:19

## 2022-10-21 RX ADMIN — ROPINIROLE HYDROCHLORIDE 2 MG: 1 TABLET, FILM COATED ORAL at 19:41

## 2022-10-21 RX ADMIN — MORPHINE SULFATE 4 MG: 4 INJECTION INTRAVENOUS at 16:04

## 2022-10-21 RX ADMIN — INSULIN LISPRO 3 UNITS: 100 INJECTION, SOLUTION INTRAVENOUS; SUBCUTANEOUS at 17:58

## 2022-10-21 RX ADMIN — PANTOPRAZOLE SODIUM 40 MG: 40 TABLET, DELAYED RELEASE ORAL at 09:19

## 2022-10-21 RX ADMIN — MORPHINE SULFATE 4 MG: 4 INJECTION INTRAVENOUS at 20:54

## 2022-10-21 RX ADMIN — METHOCARBAMOL TABLETS 750 MG: 750 TABLET, COATED ORAL at 12:23

## 2022-10-21 RX ADMIN — OXYCODONE 10 MG: 5 TABLET ORAL at 13:44

## 2022-10-21 RX ADMIN — Medication 81 MG: at 09:19

## 2022-10-21 RX ADMIN — METHOCARBAMOL TABLETS 750 MG: 750 TABLET, COATED ORAL at 17:58

## 2022-10-21 RX ADMIN — DESMOPRESSIN ACETATE 40 MG: 0.2 TABLET ORAL at 20:46

## 2022-10-21 RX ADMIN — GABAPENTIN 300 MG: 300 CAPSULE ORAL at 09:18

## 2022-10-21 RX ADMIN — GLIPIZIDE 10 MG: 10 TABLET ORAL at 15:03

## 2022-10-21 RX ADMIN — DIAZEPAM 2 MG: 2 TABLET ORAL at 05:14

## 2022-10-21 RX ADMIN — CELECOXIB 100 MG: 100 CAPSULE ORAL at 09:18

## 2022-10-21 ASSESSMENT — PAIN SCALES - GENERAL
PAINLEVEL_OUTOF10: 9
PAINLEVEL_OUTOF10: 8
PAINLEVEL_OUTOF10: 9
PAINLEVEL_OUTOF10: 9
PAINLEVEL_OUTOF10: 7
PAINLEVEL_OUTOF10: 8
PAINLEVEL_OUTOF10: 9
PAINLEVEL_OUTOF10: 8

## 2022-10-21 ASSESSMENT — PAIN DESCRIPTION - DESCRIPTORS
DESCRIPTORS: SHARP;SORE
DESCRIPTORS: SHOOTING
DESCRIPTORS: ACHING
DESCRIPTORS: SHOOTING
DESCRIPTORS: ACHING
DESCRIPTORS: BURNING
DESCRIPTORS: NAGGING;SHOOTING

## 2022-10-21 ASSESSMENT — PAIN DESCRIPTION - ORIENTATION
ORIENTATION: LEFT
ORIENTATION: LEFT
ORIENTATION: LEFT;LOWER
ORIENTATION: LEFT
ORIENTATION: LEFT;OUTER;UPPER
ORIENTATION: LEFT
ORIENTATION: LEFT

## 2022-10-21 ASSESSMENT — PAIN DESCRIPTION - LOCATION
LOCATION: BACK;LEG
LOCATION: BACK;LEG
LOCATION: BUTTOCKS;LEG
LOCATION: BACK;LEG
LOCATION: BACK;LEG
LOCATION: SACRUM
LOCATION: BACK;LEG

## 2022-10-21 NOTE — PLAN OF CARE
Problem: Pain  Goal: Verbalizes/displays adequate comfort level or baseline comfort level  10/21/2022 0030 by Kvng Sepulveda RN  Outcome: Progressing  10/20/2022 1649 by Dorian Valdez RN  Outcome: Progressing     Problem: Safety - Adult  Goal: Free from fall injury  10/21/2022 0030 by Kvng Sepulveda RN  Outcome: Progressing  10/20/2022 1649 by Dorian Valdez RN  Outcome: Progressing     Problem: Discharge Planning  Goal: Discharge to home or other facility with appropriate resources  10/21/2022 0030 by Kvng Sepulveda RN  Outcome: Progressing  10/20/2022 1649 by Dorian Valdez RN  Outcome: Progressing

## 2022-10-21 NOTE — PROGRESS NOTES
Physical Medicine & Rehabilitation  Progress Note    10/21/2022 11:16 AM     CC: Ambulatory and ADL dysfunction due to back pain, sciatica, piriformis syndrome and left upper extremity ulnar neuropathy    Subjective:   Continues with pain, participate in therapies. Concernedhome discharge due to 20+ steps and feels weak. Denies difficulty with bowels or bladder work with therapies today. Notes continued discomfort - grateful for possible [facility] inpatient rehab     ROS:  Denies fevers, chills, sweats. No chest pain, palpitations, lightheadedness. Denies coughing, wheezing or shortness of breath. Denies abdominal pain, nausea, diarrhea or constipation. No new areas of joint pain. Denies new areas of numbness or weakness. Denies new anxiety or depression issues. No new skin problems.     Rehabilitation:   PT:  Restrictions/Precautions: Up as Tolerated   Transfers  Sit to Stand: Contact guard assistance  Stand to Sit: Contact guard assistance  Comment: CGA for safety, steady, no LOB  Ambulation  Surface: Level tile  Device: Single point cane  Assistance: Contact guard assistance  Quality of Gait: steady, cautious  Gait Deviations: Slow Kaelyn, Decreased step height, Decreased step length, Decreased head and trunk rotation  Distance: 20ft      OT:  ADL  Feeding: Independent  Grooming: Modified independent , Increased time to complete  UE Bathing: Stand by assistance, Increased time to complete  LE Bathing: Increased time to complete, Adaptive equipment, Minimal assistance  UE Dressing: Increased time to complete, Stand by assistance  LE Dressing: Increased time to complete, Moderate assistance  Toileting: Increased time to complete, Minimal assistance                      Bed mobility  Supine to Sit: Stand by assistance (VC for initation of task.)  Sit to Supine:  (Retired pt to recliner chair.)  Scooting: Stand by assistance  Bed Mobility Comments: pt demonstrated log roll technique appropriately, HOB elevated ~20 degrees  Transfers  Sit to stand: Contact guard assistance (Pt demo'd 1 sit to stand transfer from EOB, requiring CGA d/t unsteadiness and pain.)  Stand to sit: Contact guard assistance (Pt completed 1 stand to sit transfer with CGA d/t unsteadiness. Pt demo'd slow descend to chair and good safety awareness.)                   ST:        Objective:  BP (!) 150/90   Pulse 94   Temp 97.8 °F (36.6 °C) (Temporal)   Resp 12   Ht 5' 3\" (1.6 m)   Wt 188 lb 15 oz (85.7 kg)   SpO2 94%   BMI 33.47 kg/m²  I Body mass index is 33.47 kg/m². I   Wt Readings from Last 1 Encounters:   10/19/22 188 lb 15 oz (85.7 kg)      Temp (24hrs), Av.8 °F (36.6 °C), Min:97.4 °F (36.3 °C), Max:98.2 °F (36.8 °C)         GEN: well developed, well nourished, no acute distress  HEENT: Normocephalic atraumatic, EOMI, mucous membranes pink and moist  CV: RRR, no murmurs, rubs or gallops  PULM: CTAB, no rales or rhonchi. Respirations WNL and unlabored  ABD: soft, NT, ND, +BS and equal  NEURO: A&O x3. Sensation intact to light touch. MSK: 4/5 left lower extremity 5/5 right lower extremity and upper extremities  EXTREMITIES: No calf tenderness to palpation bilaterally. No edema BLEs  SKIN: warm dry and intact with good turgor  PSYCH: appropriately interactive. Affect WNL.           Medications   Scheduled Meds:   insulin lispro  0-4 Units SubCUTAneous TID WC    insulin lispro  0-4 Units SubCUTAneous Nightly    dexamethasone  4 mg Oral 2 times per day    Followed by    [START ON 10/24/2022] dexamethasone  4 mg Oral Daily    Followed by    Mingo Thayer ON 10/26/2022] dexamethasone  2 mg Oral Daily    aspirin  81 mg Oral Daily    atorvastatin  40 mg Oral Nightly    busPIRone  10 mg Oral BID    celecoxib  100 mg Oral BID    dulaglutide  1.5 mg SubCUTAneous Weekly    furosemide  20 mg Oral Daily    gabapentin  300 mg Oral BID    glipiZIDE  10 mg Oral BID AC    lisinopril  40 mg Oral Daily    pantoprazole  40 mg Oral Daily    rOPINIRole  2 mg Oral Nightly    sertraline  50 mg Oral Daily    metFORMIN  1,000 mg Oral BID WC    methocarbamol  750 mg Oral 4x Daily    sodium chloride flush  5-40 mL IntraVENous 2 times per day    enoxaparin  40 mg SubCUTAneous Daily    morphine  4 mg IntraVENous Once     Continuous Infusions:   dextrose      sodium chloride       PRN Meds:.oxyCODONE, glucose, dextrose bolus **OR** dextrose bolus, glucagon (rDNA), dextrose, albuterol sulfate HFA, diazePAM, sodium chloride flush, sodium chloride, potassium chloride **OR** potassium alternative oral replacement **OR** potassium chloride, ondansetron, polyethylene glycol, acetaminophen **OR** acetaminophen     Diagnostics:     CBC: No results for input(s): WBC, RBC, HGB, HCT, MCV, RDW, PLT in the last 72 hours. BMP:   Recent Labs     10/19/22  1655      K 4.6      CO2 22   BUN 33*   CREATININE 1.08*       BNP: No results for input(s): BNP in the last 72 hours. PT/INR: No results for input(s): PROTIME, INR in the last 72 hours. APTT: No results for input(s): APTT in the last 72 hours. CARDIAC ENZYMES: No results for input(s): CKMB, CKMBINDEX, TROPONINT in the last 72 hours. Invalid input(s): CKTOTAL;3  FASTING LIPID PANEL:No results found for: CHOL, HDL, TRIG  LIVER PROFILE: No results for input(s): AST, ALT, ALB, BILIDIR, BILITOT, ALKPHOS in the last 72 hours. I/O (24Hr): Intake/Output Summary (Last 24 hours) at 10/21/2022 1116  Last data filed at 10/21/2022 0539  Gross per 24 hour   Intake --   Output 1650 ml   Net -1650 ml         Glu last 24 hour  Recent Labs     10/20/22  1136 10/20/22  1526 10/20/22  2048 10/21/22  0745   POCGLU 236* 233* 291* 251*         No results for input(s): CLARITYU, COLORU, PHUR, SPECGRAV, PROTEINU, RBCUA, BLOODU, BACTERIA, NITRU, WBCUA, LEUKOCYTESUR, YEAST, GLUCOSEU, BILIRUBINUR in the last 72 hours. MRI pelvis 10/17  Impression:        1.  Slight asymmetric atrophy of the left-sided piriformis muscle which can be   seen with left-sided piriformis syndrome. 2. Mild to moderate nonspecific edema in the right gluteus julissa muscle. However, no significant gluteal muscular atrophy. 3. Mild nonspecific edema in the posterior left obturator internus muscle. 4. No acute fracture or dislocation. No femoral head AVN. 5. Mild nonspecific edema in the subcutaneous        Type of Home: Apartment  Home Layout: One level  Home Access: Stairs to enter with rails  Entrance Stairs - Number of Steps: 21  Entrance Stairs - Rails: Right  Bathroom Shower/Tub: Tub/Shower unit  Bathroom Toilet: Standard  Bathroom Accessibility: Not accessible  Home Equipment: Cane, Rollator, Walker, rolling (pt reports ambulating with SPC most often)  Receives Help From: Family  ADL Assistance: Independent  Homemaking Assistance: Independent  Homemaking Responsibilities: Yes  Ambulation Assistance: Independent  Transfer Assistance: Independent  Active : No  Patient's  Info: last drove in may  Mode of Transportation: SUV, Family  Occupation: Unemployed  Leisure & Hobbies: crafting  Additional Comments: Pt reports supportive family, able to provide assistance upon discharge. Impression: Ms. Kat Gatica is a 65 y.o. female with a history of Sciatica     Back pain, radiculopathy possible piriformis syndrome-had epidural injection by neurosurgery 10/18-notes minimal improvement-Decadron taper,  Pain-Celebrex, IV morphine Valium, Neurontin, Robaxin, Roxicodone  Left ulnar neuropathy-ulnar surgery on hold was planned for surgery this week, rescheduled for November  Hypertension/hyperlipidemia-Norvasc, Lipitor, Lasix, Zestril  Depression-BuSpar, Zoloft  Diabetes-dulaglutide, glipizide metformin  BUN/creatinine noted to be mildly increased-consider DC Celebrex        Recommendations:  1. Diagnosis: Left lower extremity radiculopathy piriformis syndrome, left ulnar neuropathy  2.  Therapy: Ambulate 20 feet contact-guard slow javed, transfers contact-guard, however has 21 steps to get into her home, min to mod assist lower extremities, min assist toileting  3. Medical  Necessity: Above  4. Support: Clarify  5. Rehab recommendation: Currently would benefit acute inpatient rehabilitation to improve functional status work on steps, ADLs, strengthening, pain management,-patient would like [facility]     Received IV morphine x4 yesterday, noted ordered discontinued today  BUN/creatinine slightly increased-consider DC Celebrex     6. DVT proph: Lovenox     It was my pleasure to evaluate Jermeiah Noé A Anyi today. Please call with questions. Anna Roberts. Juan Dent MD       This note is created with the assistance of a speech recognition program.  While intending to generate a document that actually reflects the content of the visit, the document can still have some errors including those of syntax and sound a like substitutions which may escape proof reading.   In such instances, actual meaning can be extrapolated by contextual diversion

## 2022-10-21 NOTE — PROGRESS NOTES
Comprehensive Nutrition Assessment    Type and Reason for Visit:  RD Nutrition Re-Screen/LOS    Nutrition Recommendations/Plan:   Continue current diet  Monitor labs, weight, and intake     Malnutrition Assessment:  Malnutrition Status:  No malnutrition (10/21/22 1222)    Context:  Acute Illness     Findings of the 6 clinical characteristics of malnutrition:  Energy Intake:  No significant decrease in energy intake  Weight Loss:  No significant weight loss     Body Fat Loss:  No significant body fat loss     Muscle Mass Loss:  No significant muscle mass loss    Fluid Accumulation:  No significant fluid accumulation     Strength:  Not Performed    Nutrition Assessment:    Patient seen for LOS. Admitted with sciatica, lumbar disease disc disease. glucose 233-291, lasix, humalog, protonix. Patient reports she is careful with her intake r/t high BS d/t steroids. She reports she is eating well and having a good appetite. Nutrition Related Findings:    Labs/meds reviewed Wound Type: None       Current Nutrition Intake & Therapies:    Average Meal Intake: 51-75%  Average Supplements Intake: None Ordered  ADULT DIET; Regular    Anthropometric Measures:  Height: 5' 3\" (160 cm)  Ideal Body Weight (IBW): 115 lbs (52 kg)       Current Body Weight: 188 lb (85.3 kg), 163.5 % IBW. Weight Source: Bed Scale  Current BMI (kg/m2): 33.3                          BMI Categories: Obese Class 1 (BMI 30.0-34. 9)    Estimated Daily Nutrient Needs:  Energy Requirements Based On: Formula  Weight Used for Energy Requirements: Current  Energy (kcal/day): 1800 kcal  Weight Used for Protein Requirements: Adjusted  Protein (g/day): 73 gm  Method Used for Fluid Requirements: 1 ml/kcal  Fluid (ml/day): 1800 mL    Nutrition Diagnosis:   No nutrition diagnosis at this time related to   as evidenced by      Nutrition Interventions:   Food and/or Nutrient Delivery: Continue Current Diet  Nutrition Education/Counseling: No recommendation at this time  Coordination of Nutrition Care: Continue to monitor while inpatient  Plan of Care discussed with: Patient    Goals:  Previous Goal Met: Goal(s) Achieved  Goals: Meet at least 75% of estimated needs       Nutrition Monitoring and Evaluation:   Behavioral-Environmental Outcomes: None Identified  Food/Nutrient Intake Outcomes: Food and Nutrient Intake  Physical Signs/Symptoms Outcomes: Biochemical Data, GI Status, Skin, Weight    Discharge Planning:    No discharge needs at this time     Janeice Merlin, [address]: [id number]

## 2022-10-21 NOTE — CARE COORDINATION
Transition planning  CM met with patient yesterday, she stated she wanted to go to [address] [place] [address] for rehab. Dr. Emy Goins note stated pt would be appropriate for ARU. Referral sent to [address] [place] [address]. Faxed copy of patient's insurance card ([employer]) to registration [id number]. Called and spoke with Martina Salazar at [address] [place] [address] re: referral, she states they are able to accept patient, she will confirm patient's insurance and then start precert.

## 2022-10-21 NOTE — PROGRESS NOTES
Occupational Therapy  Facility/Department: [address] NEURO  Occupational Therapy Daily Treatment Note    Name: Sunny Poe  : 1960  MRN: [id number]  Date of Service: 10/21/2022    Discharge Recommendations:  Patient would benefit from continued therapy after discharge    Patient Diagnosis(es): The encounter diagnosis was Left sciatic nerve pain. Past Medical History:  has no past medical history on file. Past Surgical History:  has no past surgical history on file. Assessment   Performance deficits / Impairments: Decreased functional mobility ; Decreased ADL status; Decreased balance;Decreased strength;Decreased high-level IADLs;Decreased sensation  Assessment: Pt making steady gains towards goals and will continue to benefit from further OT services following discharge from [facility] care hospital.  Prognosis: Good  Activity Tolerance  Activity Tolerance: Patient limited by pain  Activity Tolerance Comments: body mechanics and safety        Plan   Occupational Therapy Plan  Times Per Week: 3-4x/wk     Restrictions  Restrictions/Precautions  Restrictions/Precautions: Up as Tolerated, General Precautions  Required Braces or Orthoses?: No    Subjective   General  Patient assessed for rehabilitation services?: Yes  Response to previous treatment: Patient with no complaints from previous session  Subjective  Subjective: RN ok'd pt for OT treatment this morning. Pt seated in recliner upon HOGAN arrival. Pt agreeable to session with minimal encouragement due to fatigue from PT just prior.  Pt reports 9/10 sciatic pain      Objective   Safety Devices  Type of Devices: Nurse notified;Gait belt;Left in chair;Call light within reach  Restraints  Restraints Initially in Place: No  Balance  Sitting: Intact (Pt sits unsupported ~10 minutes at edge of chair in upright posture)  Standing: With support (Stood 1-2 minutes with one UE support)  Gait  Overall Level of Assistance: Contact-guard assistance;Stand-by assistance  Distance (ft):  (in room distances without device)        ADL  Feeding: Independent  Grooming: Modified independent ; Increased time to complete  Grooming Skilled Clinical Factors: Oral care standing at sink  UE Dressing: Supervision  LE Dressing: Contact guard assistance  LE Dressing Skilled Clinical Factors: standing for hike over hips and buttocks  Additional Comments: Pt completed 50% seated and 50% standing to increase functional tasks. Activity Tolerance  Activity Tolerance: Patient tolerated treatment well  Bed mobility  Supine to Sit: Unable to assess  Sit to Supine: Unable to assess  Bed Mobility Comments: Pt began and concluded session seated in recliner  Transfers  Sit to stand: Stand by assistance  Stand to sit: Stand by assistance  Transfer Comments: Pt stood for ~2-3 mintues for dynamic activity to increase ADL independence. Pt demonstrates no LOB throughout reaching tasks mildly out of RICA. Cognition  Overall Cognitive Status: Exceptions  Arousal/Alertness: Appropriate responses to stimuli  Following Commands: Follows all commands without difficulty  Attention Span: Attends with cues to redirect  Memory: Appears intact  Insights: Fully aware of deficits  Initiation: Does not require cues  Sequencing: Requires cues for some  Orientation  Overall Orientation Status: Within Normal Limits         Education Given To: Patient  Education Provided: Role of Therapy; ADL Adaptive Strategies;Transfer Training  Education Method: Verbal;Demonstration  Barriers to Learning: None  Education Outcome: Verbalized understanding;Demonstrated understanding;Continued education needed       AM-PAC Score  AM-[facility] Inpatient Daily Activity Raw Score: 21 (10/21/22 1412)  AM-PAC Inpatient ADL T-Scale Score : 44.27 (10/21/22 1412)  ADL Inpatient CMS 0-100% Score: 32.79 (10/21/22 1412)  ADL Inpatient CMS G-Code Modifier : CJ (10/21/22 1412)      Goals  Short Term Goals  Time Frame for Short Term Goals:  By discharge, pt will  Short Term Goal 1: demo UB ADLs Mod I with AE/DME PRN  Short Term Goal 2: demo LB ADLs/toileting tasks CGA with AE/DME PRN  Short Term Goal 3: demo functional transfer/mobility SUP with least restrictive AD to engage in ADLs/IADLs  Short Term Goal 4: demo increased standing activity tolerance of 15+ minutes with SUP to engage in functional tasks  Short Term Goal 5: demo/ID 2 non-pharmaceutical pain management techniques with 0 VC's       Therapy Time   Individual Concurrent Group Co-treatment   Time In 1026         Time Out 1113         Minutes 47         Timed Code Treatment Minutes: [facility], HOGAN/L

## 2022-10-21 NOTE — PRE-CERTIFICATION NOTE
Attempted to initiate prior authorization with [facility] per automated line office hours are Monday - Friday 8:00 am - 8:00 pm. Automated message at 5:05 pm stated office is closed. Prior authorization request for admission sent to [email or web address]. Will call Monday to follow up during [facility] office hours and verify Eligibility and Benefits. Karli CM updated via telephone at this time.

## 2022-10-21 NOTE — PROGRESS NOTES
[address] [facility]  CDU / OBSERVATION ENCOUNTER  ATTENDING NOTE       I performed a history and physical examination of the patient and discussed management with the resident or midlevel provider. I reviewed the resident or midlevel provider's note and agree with the documented findings and plan of care. Any areas of disagreement are noted on the chart. I was personally present for the key portions of any procedures. I have documented in the chart those procedures where I was not present during the key portions. I have reviewed the nurses notes. I agree with the chief complaint, past medical history, past surgical history, allergies, medications, social and family history as documented unless otherwise noted below. The Family history, social history, and ROS are effectively unchanged since admission unless noted elsewhere in the chart. Patient with ongoing difficulty with pain control. Patient still requiring IV analgesia. Patient had manipulation done by osteopath today with some relief. Patient still on intractable pain and without tolerable relief. Patient will need ongoing supportive therapy. Will reassess. Patient for PT OT and appreciate physiatry's consult.     Smiley Moeller MD  Attending Emergency  Physician

## 2022-10-21 NOTE — PROGRESS NOTES
Physical Therapy  Facility/Department: [address] NEURO  Daily treatment note    Name: Cornell Jacob  : 1960  MRN: [id number]  Date of Service: 10/21/2022    Discharge Recommendations: Further therapy recommended at discharge. PT Equipment Recommendations  Equipment Needed: No (pt reports owning RW, cane, rollator)      Patient Diagnosis(es): The encounter diagnosis was Left sciatic nerve pain. Past Medical History:  has no past medical history on file. Past Surgical History:  has no past surgical history on file. Assessment   Body Structures, Functions, Activity Limitations Requiring Skilled Therapeutic Intervention: Decreased functional mobility ; Decreased endurance;Decreased strength;Decreased balance; Increased pain  Assessment: The pt ambulated 150ft with RW and SBA and completed 25 steps with CGA. Recommend continued PT to address sciatic pain, piriformis tightness and improve functional mobility. Therapy Prognosis: Good  Decision Making: Medium Complexity  Activity Tolerance  Activity Tolerance: Patient tolerated treatment well     Plan   Physcial Therapy Plan  General Plan: 3-5 times per week  Current Treatment Recommendations: Strengthening, ROM, Balance training, Functional mobility training, Transfer training, Endurance training, Therapeutic activities, Gait training, Stair training, Equipment evaluation, education, & procurement, Patient/Caregiver education & training, Safety education & training, Home exercise program  Safety Devices  Type of Devices: Nurse notified, Gait belt, Left in chair, Call light within reach  Restraints  Restraints Initially in Place: No     Restrictions  Restrictions/Precautions  Restrictions/Precautions: Up as Tolerated  Required Braces or Orthoses?: No     Subjective   General  Patient assessed for rehabilitation services?: Yes  Response To Previous Treatment: Patient with no complaints from previous session.   Family / Caregiver Present: No  Follows Commands: Within Functional Limits  Subjective  Subjective: RN and pt agreeable to PT. Pt supine in bed upon arrival, pleasant and cooperative throughout. Pt reports L sciatic pain 9/10        Cognition   Orientation  Overall Orientation Status: Within Functional Limits  Cognition  Overall Cognitive Status: Exceptions  Arousal/Alertness: Appropriate responses to stimuli  Attention Span: Attends with cues to redirect  Initiation: Does not require cues  Sequencing: Requires cues for some     Objective     Gross Assessment  Tone: Normal  Sensation: Impaired (numbness extending from L hip to lateral LE and 4th-5th toes; numbness in LUE ulnar nerve distribution- pending cubital tunnel release)     Bed mobility  Supine to Sit: Modified independent  Sit to Supine: Modified independent  Scooting: Modified independent  Bed Mobility Comments: pt demonstrated log roll technique appropriately, HOB elevated ~20 degrees  Transfers  Sit to Stand: Stand by assistance  Stand to Sit: Stand by assistance  Comment: Performed 4 times throughout session  Ambulation  Surface: Level tile  Device: Rolling Walker  Assistance: Supervision  Gait Deviations: Slow Kaelyn;Decreased step height;Decreased step length  Distance: 150ft  More Ambulation?: Yes  Ambulation 2  Surface - 2: level tile  Device 2: Single point cane  Assistance 2: Contact guard assistance  Quality of Gait 2: antalgic, mild lateral sway  Gait Deviations: Slow Kaelyn;Decreased step length;Decreased step height;Decreased head and trunk rotation  Distance: 10ft  Comments: Educated pt on continued use of RW to improve gait  Stairs/Curb  Stairs?: Yes  Stairs  # Steps : 25  Stairs Height: 6\"  Device: No Device  Assistance: Contact guard assistance  Comment: Performed 5 steps utilizing box step in pt's room with use of RW to simulate B HR; 20 steps in stairwell with  R ascending HR and R descending HR. Instructed pt in step-to technique with good return.  CGA for safety, no buckling or LOB noted     Balance  Posture: Good  Sitting - Static: Good  Sitting - Dynamic: Good  Standing - Static: Good  Standing - Dynamic: Good;-  Comments: standing balance assessed with RW     Exercise    Performed supine figure 4 LLE stretching, incrementally increasing depth of stretch with manual pressure to patient's tolerance for a total of 3 minutes  Performed manual trigger point release to pt's L piriformis for 2 minutes x 2  in sidelying    AM-PAC Score  AM-PAC Inpatient Mobility Raw Score : 20 (10/21/22 1226)  AM-PAC Inpatient T-Scale Score : 47.67 (10/21/22 1226)  Mobility Inpatient CMS 0-100% Score: 35.83 (10/21/22 1226)  Mobility Inpatient CMS G-Code Modifier : CJ (10/21/22 1226)        Goals  Short Term Goals  Time Frame for Short Term Goals: 10 visits  Short Term Goal 1: Perform bed mobility and functional transfers Mod I  Short Term Goal 2: Perform HEP independently  Short Term Goal 3: Ambulate 300ft with SPC or RW and supervision  Short Term Goal 4: Ascend/descend 20 steps with HR and supervision       Education  Patient Education  Education Given To: Patient  Education Provided: Role of Therapy;Plan of Care;Home Exercise Program  Education Provided Comments: reviewed piriformis stretching and IT band rolling/massage  Education Method: Verbal;Demonstration  Barriers to Learning: None  Education Outcome: Verbalized understanding;Demonstrated understanding      Therapy Time   Individual Concurrent Group Co-treatment   Time In 0921         Time Out 1015         Minutes 54         Timed Code Treatment Minutes: DYANA Mendez

## 2022-10-22 LAB
GLUCOSE BLD-MCNC: 106 MG/DL (ref 65–105)
GLUCOSE BLD-MCNC: 124 MG/DL (ref 65–105)
GLUCOSE BLD-MCNC: 173 MG/DL (ref 65–105)
GLUCOSE BLD-MCNC: 321 MG/DL (ref 65–105)

## 2022-10-22 PROCEDURE — 6360000002 HC RX W HCPCS: Performed by: EMERGENCY MEDICINE

## 2022-10-22 PROCEDURE — 6370000000 HC RX 637 (ALT 250 FOR IP)

## 2022-10-22 PROCEDURE — 2580000003 HC RX 258: Performed by: STUDENT IN AN ORGANIZED HEALTH CARE EDUCATION/TRAINING PROGRAM

## 2022-10-22 PROCEDURE — 1200000000 HC SEMI PRIVATE

## 2022-10-22 PROCEDURE — 6360000002 HC RX W HCPCS: Performed by: REGISTERED NURSE

## 2022-10-22 PROCEDURE — 6370000000 HC RX 637 (ALT 250 FOR IP): Performed by: STUDENT IN AN ORGANIZED HEALTH CARE EDUCATION/TRAINING PROGRAM

## 2022-10-22 PROCEDURE — 6370000000 HC RX 637 (ALT 250 FOR IP): Performed by: EMERGENCY MEDICINE

## 2022-10-22 PROCEDURE — 82947 ASSAY GLUCOSE BLOOD QUANT: CPT

## 2022-10-22 PROCEDURE — 6360000002 HC RX W HCPCS: Performed by: STUDENT IN AN ORGANIZED HEALTH CARE EDUCATION/TRAINING PROGRAM

## 2022-10-22 RX ADMIN — SODIUM CHLORIDE, PRESERVATIVE FREE 10 ML: 5 INJECTION INTRAVENOUS at 09:14

## 2022-10-22 RX ADMIN — PANTOPRAZOLE SODIUM 40 MG: 40 TABLET, DELAYED RELEASE ORAL at 09:10

## 2022-10-22 RX ADMIN — GLIPIZIDE 10 MG: 10 TABLET ORAL at 09:10

## 2022-10-22 RX ADMIN — METHOCARBAMOL TABLETS 750 MG: 750 TABLET, COATED ORAL at 09:10

## 2022-10-22 RX ADMIN — ACETAMINOPHEN 650 MG: 325 TABLET ORAL at 21:08

## 2022-10-22 RX ADMIN — MORPHINE SULFATE 4 MG: 4 INJECTION INTRAVENOUS at 00:16

## 2022-10-22 RX ADMIN — GABAPENTIN 300 MG: 300 CAPSULE ORAL at 21:08

## 2022-10-22 RX ADMIN — BUSPIRONE HYDROCHLORIDE 10 MG: 10 TABLET ORAL at 09:10

## 2022-10-22 RX ADMIN — ENOXAPARIN SODIUM 40 MG: 100 INJECTION SUBCUTANEOUS at 09:11

## 2022-10-22 RX ADMIN — FUROSEMIDE 20 MG: 20 TABLET ORAL at 09:10

## 2022-10-22 RX ADMIN — MORPHINE SULFATE 4 MG: 4 INJECTION INTRAVENOUS at 21:12

## 2022-10-22 RX ADMIN — Medication 81 MG: at 09:09

## 2022-10-22 RX ADMIN — ROPINIROLE HYDROCHLORIDE 2 MG: 1 TABLET, FILM COATED ORAL at 21:08

## 2022-10-22 RX ADMIN — SERTRALINE 50 MG: 50 TABLET, FILM COATED ORAL at 09:10

## 2022-10-22 RX ADMIN — INSULIN LISPRO 4 UNITS: 100 INJECTION, SOLUTION INTRAVENOUS; SUBCUTANEOUS at 21:09

## 2022-10-22 RX ADMIN — METHOCARBAMOL TABLETS 750 MG: 750 TABLET, COATED ORAL at 14:10

## 2022-10-22 RX ADMIN — METHOCARBAMOL TABLETS 750 MG: 750 TABLET, COATED ORAL at 16:09

## 2022-10-22 RX ADMIN — GLIPIZIDE 10 MG: 10 TABLET ORAL at 16:08

## 2022-10-22 RX ADMIN — GABAPENTIN 300 MG: 300 CAPSULE ORAL at 09:09

## 2022-10-22 RX ADMIN — OXYCODONE 10 MG: 5 TABLET ORAL at 11:46

## 2022-10-22 RX ADMIN — CELECOXIB 100 MG: 100 CAPSULE ORAL at 21:08

## 2022-10-22 RX ADMIN — MORPHINE SULFATE 4 MG: 4 INJECTION INTRAVENOUS at 16:11

## 2022-10-22 RX ADMIN — CELECOXIB 100 MG: 100 CAPSULE ORAL at 09:10

## 2022-10-22 RX ADMIN — BUSPIRONE HYDROCHLORIDE 10 MG: 10 TABLET ORAL at 16:08

## 2022-10-22 RX ADMIN — LISINOPRIL 40 MG: 20 TABLET ORAL at 09:10

## 2022-10-22 RX ADMIN — SODIUM CHLORIDE, PRESERVATIVE FREE 5 ML: 5 INJECTION INTRAVENOUS at 21:17

## 2022-10-22 RX ADMIN — DEXAMETHASONE 4 MG: 4 TABLET ORAL at 14:10

## 2022-10-22 RX ADMIN — METHOCARBAMOL TABLETS 750 MG: 750 TABLET, COATED ORAL at 21:07

## 2022-10-22 RX ADMIN — DESMOPRESSIN ACETATE 40 MG: 0.2 TABLET ORAL at 21:07

## 2022-10-22 RX ADMIN — DEXAMETHASONE 4 MG: 4 TABLET ORAL at 21:09

## 2022-10-22 ASSESSMENT — PAIN - FUNCTIONAL ASSESSMENT
PAIN_FUNCTIONAL_ASSESSMENT: PREVENTS OR INTERFERES SOME ACTIVE ACTIVITIES AND ADLS

## 2022-10-22 ASSESSMENT — PAIN DESCRIPTION - ORIENTATION
ORIENTATION: LEFT;OUTER;UPPER
ORIENTATION: LEFT
ORIENTATION: LEFT
ORIENTATION: LEFT;OUTER;UPPER
ORIENTATION: LEFT
ORIENTATION: LEFT;LOWER
ORIENTATION: LEFT

## 2022-10-22 ASSESSMENT — PAIN DESCRIPTION - LOCATION
LOCATION: LEG;BACK
LOCATION: BACK;LEG
LOCATION: BACK;LEG
LOCATION: BUTTOCKS;LEG
LOCATION: SACRUM
LOCATION: BUTTOCKS;LEG
LOCATION: BACK;LEG

## 2022-10-22 ASSESSMENT — PAIN DESCRIPTION - DESCRIPTORS
DESCRIPTORS: SHARP;SORE
DESCRIPTORS: SHARP;SORE
DESCRIPTORS: BURNING
DESCRIPTORS: SHOOTING
DESCRIPTORS: ACHING
DESCRIPTORS: SHOOTING
DESCRIPTORS: SHOOTING

## 2022-10-22 ASSESSMENT — PAIN SCALES - GENERAL
PAINLEVEL_OUTOF10: 8
PAINLEVEL_OUTOF10: 10
PAINLEVEL_OUTOF10: 9
PAINLEVEL_OUTOF10: 9
PAINLEVEL_OUTOF10: 7
PAINLEVEL_OUTOF10: 7
PAINLEVEL_OUTOF10: 9
PAINLEVEL_OUTOF10: 9

## 2022-10-22 NOTE — PROGRESS NOTES
[address]  CDU / OBSERVATION ENCOUNTER  ATTENDING NOTE       I performed a history and physical examination of the patient and discussed management with the resident or midlevel provider. I reviewed the resident or midlevel provider's note and agree with the documented findings and plan of care. Any areas of disagreement are noted on the chart. I was personally present for the key portions of any procedures. I have documented in the chart those procedures where I was not present during the key portions. I have reviewed the nurses notes. I agree with the chief complaint, past medical history, past surgical history, allergies, medications, social and family history as documented unless otherwise noted below. The Family history, social history, and ROS are effectively unchanged since admission unless noted elsewhere in the chart. Ivory Byrd [facility]  Attending Emergency  Physician    Patient remains symptomatic. Neurosurgery service has evaluated the patient and will that her symptoms are related to piriformis syndrome and have made recommendations regarding referral to Dr. Rebel Chavarria at [facility]. We will continue to pursue insurance approval for acute rehab but that we will most likely occur on Monday.

## 2022-10-22 NOTE — PROGRESS NOTES
100 mg, BID  diazePAM (VALIUM) tablet 2 mg, Q6H PRN  Dulaglutide SOPN 1.5 mg, Weekly  furosemide (LASIX) tablet 20 mg, Daily  gabapentin (NEURONTIN) capsule 300 mg, BID  glipiZIDE (GLUCOTROL) tablet 10 mg, BID AC  lisinopril (PRINIVIL;ZESTRIL) tablet 40 mg, Daily  pantoprazole (PROTONIX) tablet 40 mg, Daily  rOPINIRole (REQUIP) tablet 2 mg, Nightly  sertraline (ZOLOFT) tablet 50 mg, Daily  methocarbamol (ROBAXIN) tablet 750 mg, 4x Daily  sodium chloride flush 0.9 % injection 5-40 mL, 2 times per day  sodium chloride flush 0.9 % injection 5-40 mL, PRN  0.9 % sodium chloride infusion, PRN  potassium chloride (KLOR-CON M) extended release tablet 40 mEq, PRN   Or  potassium bicarb-citric acid (EFFER-K) effervescent tablet 40 mEq, PRN   Or  potassium chloride 10 mEq/100 mL IVPB (Peripheral Line), PRN  enoxaparin (LOVENOX) injection 40 mg, Daily  ondansetron (ZOFRAN) injection 4 mg, Q4H PRN  polyethylene glycol (GLYCOLAX) packet 17 g, Daily PRN  acetaminophen (TYLENOL) tablet 650 mg, Q6H PRN   Or  acetaminophen (TYLENOL) suppository 650 mg, Q6H PRN  morphine injection 4 mg, Once        All medication charted and reviewed. CONSULTS      IP CONSULT TO NEUROSURGERY  IP CONSULT TO FAMILY MEDICINE  IP CONSULT TO PHYSICAL MEDICINE REHAB    ASSESSMENT/PLAN       Siri Valles is a 65 y.o. female who presents with intractable back pain. Piriformis syndrome   Evaluated by neurosurgery. Recommend comprehensive pain specialist,    Patient on steroid taper. PT OT evaluation. PT OT evaluation recommends physical therapy several times per week. Aggressive supportive therapy. Patient not considered a surgical candidate. Continue morphine analgesia.   Continue home medications and pain control  Monitor vitals, labs, and imaging  DISPO: Plan for acute rehab, pending insurance approval.    --  Eva Ricks,    Emergency Medicine Resident Physician     This dictation was generated by voice recognition computer software. Although all attempts are made to edit the dictation for accuracy, there may be errors in the transcription that are not intended.

## 2022-10-22 NOTE — PROGRESS NOTES
OBS/CDU   Attending NOTE      Patients PCP is:  Barbara Olviares MD        SUBJECTIVE        Control remains a significant issue for this patient. Patient requiring narcotic analgesics in addition to multiple orals. Patient evaluated by Faith. Patient here tearful at all times due to frustration with situation. With LOC patient will be able to qualify for acute rehab. Patient with ongoing discomfort. PHYSICAL EXAM      General: NAD, AO X 3  Heent: EMOI, PERRL  Neck: SUPPLE, NO JVD  Cardiovascular: RRR, S1S2  Pulmonary: CTAB, NO SOB  Abdomen: SOFT, NTTP, ND, +BS  Extremities: +2/4 PULSES DISTAL, NO SWELLING  Neuro / Psych: NO NUMBNESS OR TINGLING, MENTATION AT BASELINE    PERTINENT TEST /EXAMS      I have reviewed all available laboratory results. MEDICATIONS CURRENT   No current facility-administered medications for this encounter. All medication charted and reviewed. CONSULTS      IP CONSULT TO NEUROSURGERY  IP CONSULT TO FAMILY MEDICINE  IP CONSULT TO PHYSICAL MEDICINE REHAB    ASSESSMENT/PLAN       Kedar Watkins is a 65 y.o. female who presents with       Intractable back pain. Outpatient treatment failure  Muscle relaxants  PMNR evaluation  Analgesics  PT and OT evaluations and therapy  Reassessment  Continue home medications and pain control  Monitor vitals, labs, and imaging  DISPO: pending consults and clinical improvement    --  Grady Watkins MD  Emergency Medicine Attending Physician     This dictation was generated by voice recognition computer software. Although all attempts are made to edit the dictation for accuracy, there may be errors in the transcription that are not intended.

## 2022-10-23 LAB
GLUCOSE BLD-MCNC: 173 MG/DL (ref 65–105)
GLUCOSE BLD-MCNC: 199 MG/DL (ref 65–105)
GLUCOSE BLD-MCNC: 236 MG/DL (ref 65–105)
GLUCOSE BLD-MCNC: 364 MG/DL (ref 65–105)

## 2022-10-23 PROCEDURE — 6370000000 HC RX 637 (ALT 250 FOR IP): Performed by: EMERGENCY MEDICINE

## 2022-10-23 PROCEDURE — 97110 THERAPEUTIC EXERCISES: CPT

## 2022-10-23 PROCEDURE — 1200000000 HC SEMI PRIVATE

## 2022-10-23 PROCEDURE — 82947 ASSAY GLUCOSE BLOOD QUANT: CPT

## 2022-10-23 PROCEDURE — 6360000002 HC RX W HCPCS

## 2022-10-23 PROCEDURE — 2580000003 HC RX 258: Performed by: STUDENT IN AN ORGANIZED HEALTH CARE EDUCATION/TRAINING PROGRAM

## 2022-10-23 PROCEDURE — 6360000002 HC RX W HCPCS: Performed by: EMERGENCY MEDICINE

## 2022-10-23 PROCEDURE — 6360000002 HC RX W HCPCS: Performed by: STUDENT IN AN ORGANIZED HEALTH CARE EDUCATION/TRAINING PROGRAM

## 2022-10-23 PROCEDURE — 6360000002 HC RX W HCPCS: Performed by: REGISTERED NURSE

## 2022-10-23 PROCEDURE — 6370000000 HC RX 637 (ALT 250 FOR IP)

## 2022-10-23 RX ORDER — KETOROLAC TROMETHAMINE 30 MG/ML
15 INJECTION, SOLUTION INTRAMUSCULAR; INTRAVENOUS EVERY 6 HOURS PRN
Status: COMPLETED | OUTPATIENT
Start: 2022-10-23 | End: 2022-10-24

## 2022-10-23 RX ADMIN — SODIUM CHLORIDE, PRESERVATIVE FREE 10 ML: 5 INJECTION INTRAVENOUS at 21:30

## 2022-10-23 RX ADMIN — GLIPIZIDE 10 MG: 10 TABLET ORAL at 15:02

## 2022-10-23 RX ADMIN — CELECOXIB 100 MG: 100 CAPSULE ORAL at 08:40

## 2022-10-23 RX ADMIN — SERTRALINE 50 MG: 50 TABLET, FILM COATED ORAL at 08:40

## 2022-10-23 RX ADMIN — INSULIN LISPRO 4 UNITS: 100 INJECTION, SOLUTION INTRAVENOUS; SUBCUTANEOUS at 21:16

## 2022-10-23 RX ADMIN — GABAPENTIN 300 MG: 300 CAPSULE ORAL at 08:40

## 2022-10-23 RX ADMIN — METHOCARBAMOL TABLETS 750 MG: 750 TABLET, COATED ORAL at 20:55

## 2022-10-23 RX ADMIN — BUSPIRONE HYDROCHLORIDE 10 MG: 10 TABLET ORAL at 15:02

## 2022-10-23 RX ADMIN — LISINOPRIL 40 MG: 20 TABLET ORAL at 08:40

## 2022-10-23 RX ADMIN — SODIUM CHLORIDE, PRESERVATIVE FREE 10 ML: 5 INJECTION INTRAVENOUS at 08:44

## 2022-10-23 RX ADMIN — INSULIN LISPRO 1 UNITS: 100 INJECTION, SOLUTION INTRAVENOUS; SUBCUTANEOUS at 13:07

## 2022-10-23 RX ADMIN — METHOCARBAMOL TABLETS 750 MG: 750 TABLET, COATED ORAL at 17:25

## 2022-10-23 RX ADMIN — DIAZEPAM 2 MG: 2 TABLET ORAL at 01:09

## 2022-10-23 RX ADMIN — ENOXAPARIN SODIUM 40 MG: 100 INJECTION SUBCUTANEOUS at 08:41

## 2022-10-23 RX ADMIN — FUROSEMIDE 20 MG: 20 TABLET ORAL at 08:40

## 2022-10-23 RX ADMIN — Medication 81 MG: at 08:40

## 2022-10-23 RX ADMIN — GLIPIZIDE 10 MG: 10 TABLET ORAL at 08:39

## 2022-10-23 RX ADMIN — DEXAMETHASONE 4 MG: 4 TABLET ORAL at 08:39

## 2022-10-23 RX ADMIN — DESMOPRESSIN ACETATE 40 MG: 0.2 TABLET ORAL at 20:55

## 2022-10-23 RX ADMIN — BUSPIRONE HYDROCHLORIDE 10 MG: 10 TABLET ORAL at 08:40

## 2022-10-23 RX ADMIN — METHOCARBAMOL TABLETS 750 MG: 750 TABLET, COATED ORAL at 13:08

## 2022-10-23 RX ADMIN — METHOCARBAMOL TABLETS 750 MG: 750 TABLET, COATED ORAL at 08:40

## 2022-10-23 RX ADMIN — KETOROLAC TROMETHAMINE 15 MG: 30 INJECTION, SOLUTION INTRAMUSCULAR; INTRAVENOUS at 15:00

## 2022-10-23 RX ADMIN — PANTOPRAZOLE SODIUM 40 MG: 40 TABLET, DELAYED RELEASE ORAL at 08:39

## 2022-10-23 RX ADMIN — MORPHINE SULFATE 4 MG: 4 INJECTION INTRAVENOUS at 01:09

## 2022-10-23 RX ADMIN — ROPINIROLE HYDROCHLORIDE 2 MG: 1 TABLET, FILM COATED ORAL at 20:55

## 2022-10-23 RX ADMIN — GABAPENTIN 300 MG: 300 CAPSULE ORAL at 20:55

## 2022-10-23 RX ADMIN — KETOROLAC TROMETHAMINE 15 MG: 30 INJECTION, SOLUTION INTRAMUSCULAR; INTRAVENOUS at 21:30

## 2022-10-23 RX ADMIN — CELECOXIB 100 MG: 100 CAPSULE ORAL at 20:55

## 2022-10-23 RX ADMIN — OXYCODONE 10 MG: 5 TABLET ORAL at 10:55

## 2022-10-23 ASSESSMENT — PAIN DESCRIPTION - ONSET: ONSET: ON-GOING

## 2022-10-23 ASSESSMENT — PAIN DESCRIPTION - LOCATION
LOCATION: BUTTOCKS;LEG
LOCATION: BACK;LEG
LOCATION: LEG;BACK
LOCATION: BACK;LEG

## 2022-10-23 ASSESSMENT — PAIN DESCRIPTION - ORIENTATION
ORIENTATION: LEFT
ORIENTATION: LEFT;OUTER;UPPER
ORIENTATION: LEFT
ORIENTATION: LEFT

## 2022-10-23 ASSESSMENT — PAIN DESCRIPTION - DESCRIPTORS
DESCRIPTORS: ACHING;DISCOMFORT;SHOOTING
DESCRIPTORS: SHOOTING
DESCRIPTORS: ACHING;DISCOMFORT;SHOOTING
DESCRIPTORS: SORE;SHARP

## 2022-10-23 ASSESSMENT — PAIN SCALES - GENERAL
PAINLEVEL_OUTOF10: 8
PAINLEVEL_OUTOF10: 9
PAINLEVEL_OUTOF10: 9
PAINLEVEL_OUTOF10: 8

## 2022-10-23 ASSESSMENT — PAIN - FUNCTIONAL ASSESSMENT
PAIN_FUNCTIONAL_ASSESSMENT: ACTIVITIES ARE NOT PREVENTED

## 2022-10-23 ASSESSMENT — PAIN DESCRIPTION - PAIN TYPE: TYPE: CHRONIC PAIN

## 2022-10-23 ASSESSMENT — PAIN DESCRIPTION - FREQUENCY: FREQUENCY: CONTINUOUS

## 2022-10-23 NOTE — PRE-CERTIFICATION NOTE
Received e-mail from [facility], Medical Management Specilist with [email or web address]     \"This member's policy is not managed at this address:    Jesus Pena  1960  [id number]     Please contact the Provider Service number listed on the back of the member's insurance ID card for guidance on where to submit your request.\"    Will follow up tomorrow during first available business office hours for joel GUSMAN Monday.

## 2022-10-23 NOTE — PLAN OF CARE
Problem: Pain  Goal: Verbalizes/displays adequate comfort level or baseline comfort level  10/23/2022 1741 by Ken Silvestre RN  Outcome: Progressing  10/23/2022 0418 by Aimee Angelo RN  Outcome: Progressing     Problem: Safety - Adult  Goal: Free from fall injury  10/23/2022 1741 by Ken Silvestre RN  Outcome: Progressing  10/23/2022 0418 by Aimee Angelo RN  Outcome: Progressing     Problem: Discharge Planning  Goal: Discharge to home or other facility with appropriate resources  10/23/2022 1741 by Ken Silvestre RN  Outcome: Progressing  10/23/2022 0418 by Aimee Angelo RN  Outcome: Progressing

## 2022-10-23 NOTE — PLAN OF CARE
Problem: Pain  Goal: Verbalizes/displays adequate comfort level or baseline comfort level  10/23/2022 0418 by Felice Monique RN  Outcome: Progressing  10/22/2022 1733 by Ildefonso Mckinnon RN  Outcome: Progressing     Problem: Safety - Adult  Goal: Free from fall injury  10/23/2022 0418 by Felice Monique RN  Outcome: Progressing  10/22/2022 1733 by Ildefonso Mckinnon RN  Outcome: Progressing     Problem: Discharge Planning  Goal: Discharge to home or other facility with appropriate resources  10/23/2022 0418 by Felice Monique RN  Outcome: Progressing  10/22/2022 1733 by Ildefonso Mckinnon RN  Outcome: Progressing

## 2022-10-23 NOTE — PROGRESS NOTES
[address]  CDU / OBSERVATION ENCOUNTER  ATTENDING NOTE       I performed a history and physical examination of the patient and discussed management with the resident or midlevel provider. I reviewed the resident or midlevel provider's note and agree with the documented findings and plan of care. Any areas of disagreement are noted on the chart. I was personally present for the key portions of any procedures. I have documented in the chart those procedures where I was not present during the key portions. I have reviewed the nurses notes. I agree with the chief complaint, past medical history, past surgical history, allergies, medications, social and family history as documented unless otherwise noted below. The Family history, social history, and ROS are effectively unchanged since admission unless noted elsewhere in the chart. Patient with ongoing symptoms. Patient appropriate for inpatient rehab. Awaiting insurance approval.  Patient currently uncomfortable but analgesics have been blunting the pain for her. We will make an effort for transfer on Monday.     Benjamín Martins MD  Attending Emergency  Physician

## 2022-10-23 NOTE — PROGRESS NOTES
OBS/CDU   RESIDENT NOTE      Patients PCP is: No primary care provider on file. SUBJECTIVE      Is resting comfortably in bed. States that her pain got worse acutely yesterday after getting up for a shower. The patient also expresses that she is disheartened that her past few blood sugar readings have been high, she knows that this is because of steroids. Has been able to tolerate a full diet without nausea or vomiting. The patient is urinating on her own and is passing flatus. Denies fever, chills, nausea, vomiting, chest pain, shortness of breath, abdominal pain, urinary/bowel symptoms, vision changes, visual hallucinations, or headache. PHYSICAL EXAM      General: NAD, AO X 3  Heent: EMOI, PERRL  Neck: SUPPLE, NO JVD  Cardiovascular: RRR, S1S2  Pulmonary: CTAB, NO SOB  Abdomen: SOFT, NTTP, ND, +BS  Extremities: +2/4 PULSES DISTAL, NO SWELLING, tenderness elicited upon palpating over the left piriformis muscle. Neuro / Psych: NO NUMBNESS OR TINGLING, MENTATION AT BASELINE    PERTINENT TEST /EXAMS      I have reviewed all available laboratory results.     MEDICATIONS CURRENT   ketorolac (TORADOL) injection 15 mg, Q6H PRN  Empagliflozin-metFORMIN HCl 5-1000 MG TABS 5 mg (Patient Supplied), BID  morphine injection 4 mg, Q2H PRN  oxyCODONE (ROXICODONE) immediate release tablet 10 mg, Q4H PRN  glucose chewable tablet 16 g, PRN  dextrose bolus 10% 125 mL, PRN   Or  dextrose bolus 10% 250 mL, PRN  glucagon (rDNA) injection 1 mg, PRN  dextrose 10 % infusion, Continuous PRN  insulin lispro (HUMALOG) injection vial 0-4 Units, TID WC  insulin lispro (HUMALOG) injection vial 0-4 Units, Nightly  [START ON 10/24/2022] dexamethasone (DECADRON) tablet 4 mg, Daily   Followed by  Federico Lepe ON 10/26/2022] dexamethasone (DECADRON) tablet 2 mg, Daily  albuterol sulfate HFA (PROVENTIL;VENTOLIN;PROAIR) 108 (90 Base) MCG/ACT inhaler 2 puff, Q6H PRN  aspirin EC tablet 81 mg, Daily  atorvastatin (LIPITOR) tablet 40 mg, Nightly  busPIRone (BUSPAR) tablet 10 mg, BID  celecoxib (CELEBREX) capsule 100 mg, BID  diazePAM (VALIUM) tablet 2 mg, Q6H PRN  Dulaglutide SOPN 1.5 mg, Weekly  furosemide (LASIX) tablet 20 mg, Daily  gabapentin (NEURONTIN) capsule 300 mg, BID  glipiZIDE (GLUCOTROL) tablet 10 mg, BID AC  lisinopril (PRINIVIL;ZESTRIL) tablet 40 mg, Daily  pantoprazole (PROTONIX) tablet 40 mg, Daily  rOPINIRole (REQUIP) tablet 2 mg, Nightly  sertraline (ZOLOFT) tablet 50 mg, Daily  methocarbamol (ROBAXIN) tablet 750 mg, 4x Daily  sodium chloride flush 0.9 % injection 5-40 mL, 2 times per day  sodium chloride flush 0.9 % injection 5-40 mL, PRN  0.9 % sodium chloride infusion, PRN  potassium chloride (KLOR-CON M) extended release tablet 40 mEq, PRN   Or  potassium bicarb-citric acid (EFFER-K) effervescent tablet 40 mEq, PRN   Or  potassium chloride 10 mEq/100 mL IVPB (Peripheral Line), PRN  enoxaparin (LOVENOX) injection 40 mg, Daily  ondansetron (ZOFRAN) injection 4 mg, Q4H PRN  polyethylene glycol (GLYCOLAX) packet 17 g, Daily PRN  acetaminophen (TYLENOL) tablet 650 mg, Q6H PRN   Or  acetaminophen (TYLENOL) suppository 650 mg, Q6H PRN  morphine injection 4 mg, Once        All medication charted and reviewed. CONSULTS      IP CONSULT TO NEUROSURGERY  IP CONSULT TO FAMILY MEDICINE  IP CONSULT TO PHYSICAL MEDICINE REHAB    ASSESSMENT/PLAN       Myrtle Ly is a 65 y.o. female who presents with lower back pain radiating to her lower limbs. The pain was originally thought to be due to lumbar radiculopathy, however neurosurgery attending believes this is more likely to be due to sciatic compression/piriformis syndrome. No neurosurgical intervention, neurosurgery signed off the patient will follow-up as an outpatient  Pain specialist doctor was recommended  Continue with oral steroids taper  PT evaluate/treat commenced with the goal of 3-5 times per week.   Toradol added on top of the patient's morphine analgesia  Continue home medications and pain control  Monitor vitals, labs, and imaging    DISPO: Plan for acute rehab, initial attempt unsuccessful will try again on Monday    --  Sundra MD Sahra  Emergency Medicine Resident Physician     This dictation was generated by voice recognition computer software. Although all attempts are made to edit the dictation for accuracy, there may be errors in the transcription that are not intended.

## 2022-10-24 LAB
GLUCOSE BLD-MCNC: 190 MG/DL (ref 65–105)
GLUCOSE BLD-MCNC: 218 MG/DL (ref 65–105)
GLUCOSE BLD-MCNC: 78 MG/DL (ref 65–105)
GLUCOSE BLD-MCNC: 90 MG/DL (ref 65–105)

## 2022-10-24 PROCEDURE — 6370000000 HC RX 637 (ALT 250 FOR IP): Performed by: EMERGENCY MEDICINE

## 2022-10-24 PROCEDURE — 6360000002 HC RX W HCPCS: Performed by: EMERGENCY MEDICINE

## 2022-10-24 PROCEDURE — 6360000002 HC RX W HCPCS: Performed by: REGISTERED NURSE

## 2022-10-24 PROCEDURE — 97530 THERAPEUTIC ACTIVITIES: CPT

## 2022-10-24 PROCEDURE — 97116 GAIT TRAINING THERAPY: CPT

## 2022-10-24 PROCEDURE — 82947 ASSAY GLUCOSE BLOOD QUANT: CPT

## 2022-10-24 PROCEDURE — 1200000000 HC SEMI PRIVATE

## 2022-10-24 PROCEDURE — 6360000002 HC RX W HCPCS

## 2022-10-24 PROCEDURE — 2580000003 HC RX 258: Performed by: STUDENT IN AN ORGANIZED HEALTH CARE EDUCATION/TRAINING PROGRAM

## 2022-10-24 RX ORDER — METHOCARBAMOL 750 MG/1
750 TABLET, FILM COATED ORAL EVERY 8 HOURS PRN
Status: DISCONTINUED | OUTPATIENT
Start: 2022-10-24 | End: 2022-10-25 | Stop reason: HOSPADM

## 2022-10-24 RX ADMIN — Medication 81 MG: at 09:20

## 2022-10-24 RX ADMIN — METHOCARBAMOL TABLETS 750 MG: 750 TABLET, COATED ORAL at 16:38

## 2022-10-24 RX ADMIN — MORPHINE SULFATE 4 MG: 4 INJECTION INTRAVENOUS at 22:47

## 2022-10-24 RX ADMIN — DEXAMETHASONE 4 MG: 4 TABLET ORAL at 09:44

## 2022-10-24 RX ADMIN — CELECOXIB 100 MG: 100 CAPSULE ORAL at 09:20

## 2022-10-24 RX ADMIN — OXYCODONE 10 MG: 5 TABLET ORAL at 16:38

## 2022-10-24 RX ADMIN — OXYCODONE 10 MG: 5 TABLET ORAL at 02:20

## 2022-10-24 RX ADMIN — SERTRALINE 50 MG: 50 TABLET, FILM COATED ORAL at 09:20

## 2022-10-24 RX ADMIN — GABAPENTIN 300 MG: 300 CAPSULE ORAL at 20:39

## 2022-10-24 RX ADMIN — GABAPENTIN 300 MG: 300 CAPSULE ORAL at 09:20

## 2022-10-24 RX ADMIN — METHOCARBAMOL TABLETS 750 MG: 750 TABLET, COATED ORAL at 09:20

## 2022-10-24 RX ADMIN — KETOROLAC TROMETHAMINE 15 MG: 30 INJECTION, SOLUTION INTRAMUSCULAR; INTRAVENOUS at 18:27

## 2022-10-24 RX ADMIN — ROPINIROLE HYDROCHLORIDE 2 MG: 1 TABLET, FILM COATED ORAL at 20:39

## 2022-10-24 RX ADMIN — DIAZEPAM 2 MG: 2 TABLET ORAL at 20:39

## 2022-10-24 RX ADMIN — FUROSEMIDE 20 MG: 20 TABLET ORAL at 09:20

## 2022-10-24 RX ADMIN — SODIUM CHLORIDE, PRESERVATIVE FREE 10 ML: 5 INJECTION INTRAVENOUS at 20:41

## 2022-10-24 RX ADMIN — GLIPIZIDE 10 MG: 10 TABLET ORAL at 16:38

## 2022-10-24 RX ADMIN — KETOROLAC TROMETHAMINE 15 MG: 30 INJECTION, SOLUTION INTRAMUSCULAR; INTRAVENOUS at 09:44

## 2022-10-24 RX ADMIN — OXYCODONE 10 MG: 5 TABLET ORAL at 09:44

## 2022-10-24 RX ADMIN — SODIUM CHLORIDE, PRESERVATIVE FREE 10 ML: 5 INJECTION INTRAVENOUS at 09:50

## 2022-10-24 RX ADMIN — METHOCARBAMOL TABLETS 750 MG: 750 TABLET, COATED ORAL at 20:40

## 2022-10-24 RX ADMIN — DESMOPRESSIN ACETATE 40 MG: 0.2 TABLET ORAL at 20:39

## 2022-10-24 RX ADMIN — GLIPIZIDE 10 MG: 10 TABLET ORAL at 09:20

## 2022-10-24 RX ADMIN — PANTOPRAZOLE SODIUM 40 MG: 40 TABLET, DELAYED RELEASE ORAL at 09:20

## 2022-10-24 RX ADMIN — BUSPIRONE HYDROCHLORIDE 10 MG: 10 TABLET ORAL at 09:20

## 2022-10-24 RX ADMIN — METHOCARBAMOL TABLETS 750 MG: 750 TABLET, COATED ORAL at 13:00

## 2022-10-24 RX ADMIN — BUSPIRONE HYDROCHLORIDE 10 MG: 10 TABLET ORAL at 16:38

## 2022-10-24 RX ADMIN — CELECOXIB 100 MG: 100 CAPSULE ORAL at 20:39

## 2022-10-24 RX ADMIN — LISINOPRIL 40 MG: 20 TABLET ORAL at 09:20

## 2022-10-24 ASSESSMENT — PAIN DESCRIPTION - LOCATION
LOCATION: HIP;LEG
LOCATION: BACK;LEG
LOCATION: BUTTOCKS;HIP;LEG
LOCATION: BUTTOCKS;LEG
LOCATION: HIP;LEG
LOCATION: BUTTOCKS;LEG

## 2022-10-24 ASSESSMENT — PAIN DESCRIPTION - DESCRIPTORS
DESCRIPTORS: ACHING;DULL
DESCRIPTORS: ACHING;DISCOMFORT;SHARP;SHOOTING
DESCRIPTORS: SHARP;SORE
DESCRIPTORS: SHARP;SORE
DESCRIPTORS: ACHING
DESCRIPTORS: ACHING;DISCOMFORT;DULL

## 2022-10-24 ASSESSMENT — PAIN SCALES - GENERAL
PAINLEVEL_OUTOF10: 9
PAINLEVEL_OUTOF10: 8
PAINLEVEL_OUTOF10: 9
PAINLEVEL_OUTOF10: 8

## 2022-10-24 ASSESSMENT — PAIN DESCRIPTION - ORIENTATION
ORIENTATION: LEFT;OUTER;UPPER
ORIENTATION: LEFT
ORIENTATION: LEFT;OUTER;UPPER

## 2022-10-24 ASSESSMENT — PAIN DESCRIPTION - PAIN TYPE
TYPE: CHRONIC PAIN
TYPE: CHRONIC PAIN

## 2022-10-24 ASSESSMENT — PAIN - FUNCTIONAL ASSESSMENT
PAIN_FUNCTIONAL_ASSESSMENT: PREVENTS OR INTERFERES SOME ACTIVE ACTIVITIES AND ADLS
PAIN_FUNCTIONAL_ASSESSMENT: ACTIVITIES ARE NOT PREVENTED
PAIN_FUNCTIONAL_ASSESSMENT: PREVENTS OR INTERFERES SOME ACTIVE ACTIVITIES AND ADLS

## 2022-10-24 ASSESSMENT — PAIN DESCRIPTION - FREQUENCY
FREQUENCY: CONTINUOUS
FREQUENCY: CONTINUOUS

## 2022-10-24 ASSESSMENT — PAIN DESCRIPTION - ONSET
ONSET: ON-GOING
ONSET: ON-GOING

## 2022-10-24 NOTE — PRE-CERTIFICATION NOTE
Follow up completed for prior authorization request for Acute Inpatient Rehabilitation    Spoke with [facility] Representative, August B, Call [id number]. Pending U#[id number]  Clinicals to be faxed to [phone]. Need updated PT note. Eligibility and Benefits verified - see note    VM left for CM to update on Prior auth status, eligibility and benefit information and need for PT note to submit clinical documentation for review.

## 2022-10-24 NOTE — PROGRESS NOTES
OBS/CDU   RESIDENT NOTE      Patients PCP is: No primary care provider on file. SUBJECTIVE      Is resting comfortably in bed. States that she still in 8 out of 10 pain. Would like to try Robaxin as it has previously helped. Has been able to tolerate a full diet without nausea or vomiting. The patient is urinating on her own and is passing flatus. Denies fever, chills, nausea, vomiting, chest pain, shortness of breath, abdominal pain, urinary/bowel symptoms, vision changes, visual hallucinations, or headache. PHYSICAL EXAM      General: NAD, AO X 3  Heent: EMOI, PERRL  Neck: SUPPLE, NO JVD  Cardiovascular: RRR, S1S2  Pulmonary: CTAB, NO SOB  Abdomen: SOFT, NTTP, ND, +BS  Extremities: +2/4 PULSES DISTAL, NO SWELLING, tenderness elicited upon palpating over the left piriformis muscle. Neuro / Psych: NO NUMBNESS OR TINGLING, MENTATION AT BASELINE    PERTINENT TEST /EXAMS      I have reviewed all available laboratory results.     MEDICATIONS CURRENT   methocarbamol (ROBAXIN) tablet 750 mg, Q8H PRN  ketorolac (TORADOL) injection 15 mg, Q6H PRN  Empagliflozin-metFORMIN HCl 5-1000 MG TABS 5 mg (Patient Supplied), BID  morphine injection 4 mg, Q2H PRN  oxyCODONE (ROXICODONE) immediate release tablet 10 mg, Q4H PRN  glucose chewable tablet 16 g, PRN  dextrose bolus 10% 125 mL, PRN   Or  dextrose bolus 10% 250 mL, PRN  glucagon (rDNA) injection 1 mg, PRN  dextrose 10 % infusion, Continuous PRN  insulin lispro (HUMALOG) injection vial 0-4 Units, TID WC  insulin lispro (HUMALOG) injection vial 0-4 Units, Nightly  dexamethasone (DECADRON) tablet 4 mg, Daily   Followed by  Bear Rasonu ON 10/26/2022] dexamethasone (DECADRON) tablet 2 mg, Daily  albuterol sulfate HFA (PROVENTIL;VENTOLIN;PROAIR) 108 (90 Base) MCG/ACT inhaler 2 puff, Q6H PRN  aspirin EC tablet 81 mg, Daily  atorvastatin (LIPITOR) tablet 40 mg, Nightly  busPIRone (BUSPAR) tablet 10 mg, BID  celecoxib (CELEBREX) capsule 100 mg, BID  diazePAM (VALIUM) tablet 2 mg, Q6H PRN  Dulaglutide SOPN 1.5 mg (Patient Supplied), Weekly  furosemide (LASIX) tablet 20 mg, Daily  gabapentin (NEURONTIN) capsule 300 mg, BID  glipiZIDE (GLUCOTROL) tablet 10 mg, BID AC  lisinopril (PRINIVIL;ZESTRIL) tablet 40 mg, Daily  pantoprazole (PROTONIX) tablet 40 mg, Daily  rOPINIRole (REQUIP) tablet 2 mg, Nightly  sertraline (ZOLOFT) tablet 50 mg, Daily  methocarbamol (ROBAXIN) tablet 750 mg, 4x Daily  sodium chloride flush 0.9 % injection 5-40 mL, 2 times per day  sodium chloride flush 0.9 % injection 5-40 mL, PRN  0.9 % sodium chloride infusion, PRN  potassium chloride (KLOR-CON M) extended release tablet 40 mEq, PRN   Or  potassium bicarb-citric acid (EFFER-K) effervescent tablet 40 mEq, PRN   Or  potassium chloride 10 mEq/100 mL IVPB (Peripheral Line), PRN  enoxaparin (LOVENOX) injection 40 mg, Daily  ondansetron (ZOFRAN) injection 4 mg, Q4H PRN  polyethylene glycol (GLYCOLAX) packet 17 g, Daily PRN  acetaminophen (TYLENOL) tablet 650 mg, Q6H PRN   Or  acetaminophen (TYLENOL) suppository 650 mg, Q6H PRN  morphine injection 4 mg, Once      All medication charted and reviewed. CONSULTS      IP CONSULT TO NEUROSURGERY  IP CONSULT TO FAMILY MEDICINE  IP CONSULT TO PHYSICAL MEDICINE REHAB    ASSESSMENT/PLAN       Jenn Andrew is a 65 y.o. female who presents with lower back pain radiating to her lower limbs. The pain was originally thought to be due to lumbar radiculopathy, however neurosurgery attending believes this is more likely to be due to sciatic compression/piriformis syndrome. No neurosurgical intervention, neurosurgery signed off the patient will follow-up as an outpatient  Pain specialist doctor was recommended  Continue with oral steroids taper, last dose on 10/27 at 0900  PT treatment ongoing with the goal of 3-4 times per week.   Toradol added on top of the patient's morphine analgesia  Robaxin added today, 750mg TID PRN  Continue home medications and pain control  Monitor vitals, labs, and imaging     DISPO: Plan for acute rehab, initial attempt unsuccessful will continue attempts    --  Storm Jay MD  Emergency Medicine Resident Physician     This dictation was generated by voice recognition computer software. Although all attempts are made to edit the dictation for accuracy, there may be errors in the transcription that are not intended.

## 2022-10-24 NOTE — PROGRESS NOTES
[address]  CDU / OBSERVATION ENCOUNTER  ATTENDING NOTE       I performed a history and physical examination of the patient and discussed management with the resident or midlevel provider. I reviewed the resident or midlevel provider's note and agree with the documented findings and plan of care. Any areas of disagreement are noted on the chart. I was personally present for the key portions of any procedures. I have documented in the chart those procedures where I was not present during the key portions. I have reviewed the nurses notes. I agree with the chief complaint, past medical history, past surgical history, allergies, medications, social and family history as documented unless otherwise noted below. The Family history, social history, and ROS are effectively unchanged since admission unless noted elsewhere in the chart. Patient assessed by [employer]. Patient with significant ongoing disability secondary to piriformis syndrome. Appearing reasonably well but with significant discomfort. Patient rates pain as intolerable without current pain medication regimen. We have been trying to decrease patient's pain medications without success. Patient finding some better control with Toradol added. Improved pain control with Toradol but still significant discomfort. Patient requiring ongoing aggressive parenteral therapy.   Patient for PMNR acute rehab once certification is cleared    Fortunato Conley MD  Attending Emergency  Physician

## 2022-10-24 NOTE — CARE COORDINATION
ACUTE INPATIENT REHABILITATION  Financial Disclosure Statement: Eligibility and Benefit Verification    Patient Name: Iliana Blackwell MRN: [id number]     Disclosure [facility] at [facility] provides 24 hour individualized service to patients with functional limitations due to, but not limited to stroke, brain injury, spinal cord injury, major multiple trauma, hip fracture, amputation, and neurological disorders. Acute Inpatient Rehabilitation provides rehabilitative nursing, physician coverage, as well as physical therapy, occupational therapy, speech therapy, recreational therapy and other services as deemed necessary by our Board Certified Physical Medicine and [facility] Evangelist Romero, Dr. Walker Closs and Dr. Nigel Kemp and Dr. Melani Sands. [facility]) Acute Inpatient Rehabilitation at [facility] is fully accredited by the Commission on Accreditation of Rehabilitation Facilities (CARF) and The Joint Commission (TJC). At a minimum, you will receive 5 days of therapy services totaling at least 15 hours per week. Your treatment program will consist of physical therapy at least 7.5 hours per week; occupational therapy 7.5 hours per week; and speech therapy 1.5 hours per week, as applicable. Your estimated length of stay is currently:  7-10 days. Your insurance coverage for acute inpatient rehabilitation has been verified as follows:   Date Verified: 10/24/2022   Method:  Phone Call: Call Ref# [id number], Representative: Abisai Carbajal Insurance: Julita Devlin - [place]  Coverage:  Per Benefit Period  Plan Effective Date: 10/01/2022 Status: Active  Deductible: $1000/individual (Amount Currently Met $1000)  Co-Pay: No co-pay  Co-Insurance: 90% of maximum allowed amount  Maximum Out of Pocket: $2000 (Amount Currently Met $2000)      Secondary Insurance: Not Applicable  Secondary insurance policies often cover co-payments amounts. Please contact your insurance company to verify benefits. [facility]: [phone] for all billing questions.     Thank You for choosing [address] [place] [address] at [facility].                   Revised 10/04/2022

## 2022-10-24 NOTE — PROGRESS NOTES
Physical Therapy  Facility/Department: [address] NEURO  Physical Therapy Daily Treatment Note    Name: Marco Antonio Borja  : 1960  MRN: [id number]  Date of Service: 10/24/2022  Discharge Recommendations:    Further therapy recommended at discharge. PT Equipment Recommendations  Equipment Needed: No (pt reports owning RW, cane, rollator)      Patient Diagnosis(es): The encounter diagnosis was Left sciatic nerve pain. Past Medical History:  has no past medical history on file. Past Surgical History:  has no past surgical history on file. Assessment   Body Structures, Functions, Activity Limitations Requiring Skilled Therapeutic Intervention: Decreased functional mobility ; Decreased endurance;Decreased strength;Decreased balance; Increased pain  Assessment: The pt ambulated 500ft with RW with supervision. Recommend continued PT to address sciatic pain, piriformis tightness and improve functional mobility. Therapy Prognosis: Good  Decision Making: Medium Complexity  Requires PT Follow-Up: Yes  Activity Tolerance  Activity Tolerance: Patient tolerated treatment well     Plan   Physcial Therapy Plan  General Plan: 3-5 times per week  Current Treatment Recommendations: Strengthening, ROM, Balance training, Functional mobility training, Transfer training, Endurance training, Therapeutic activities, Gait training, Stair training, Equipment evaluation, education, & procurement, Patient/Caregiver education & training, Safety education & training, Home exercise program  Safety Devices  Type of Devices: Call light within reach, Nurse notified, Left in bed  Restraints  Restraints Initially in Place: No     Restrictions  Restrictions/Precautions  Restrictions/Precautions: Up as Tolerated, General Precautions  Required Braces or Orthoses?: No     Subjective   General  Patient assessed for rehabilitation services?: Yes  Response To Previous Treatment: Patient with no complaints from previous session.   Family / Caregiver Present: No  Follows Commands: Within Functional Limits  Subjective  Subjective: RN and pt agreeable to PT. Pt supine in bed upon arrival, pleasant and cooperative throughout. Pt reports L sciatic pain 8/10    Objective      Gross Assessment  Tone: Normal  Sensation: Impaired (numbness extending from L hip to lateral LE and 4th-5th toes; numbness in LUE ulnar nerve distribution- pending cubital tunnel release)   Bed mobility  Supine to Sit: Modified independent  Sit to Supine:  (Did not assess- pt seated in bedside chair upon writer's exit)  Bed Mobility Comments: HOB elevated 45 degrees  Transfers  Sit to Stand: Supervision  Stand to Sit: Supervision  Ambulation  Surface: Level tile  Device: Rolling Walker  Assistance: Supervision  Quality of Gait: steady, cautious  Gait Deviations: Slow Kaelyn;Decreased step height;Decreased step length  Distance: 500ft  More Ambulation?: No  Stairs/Curb  Stairs?: No (pt declining stair trial this date due to pain)     Balance  Posture: Good  Sitting - Static: Good  Sitting - Dynamic: Good  Standing - Static: Good  Standing - Dynamic: Good;-  Comments: standing balance assessed with RW         Reviewed piriformis stretching technique and proper dosage. Pt proper demonstrates performance with 2 x20s stretches performed in seated position.      AM-PAC Score  AM-PAC Inpatient Mobility Raw Score : 22 (10/24/22 1602)  AM-PAC Inpatient T-Scale Score : 53.28 (10/24/22 1602)  Mobility Inpatient CMS 0-100% Score: 20.91 (10/24/22 1602)  Mobility Inpatient CMS G-Code Modifier : CJ (10/24/22 1602)    Goals  Short Term Goals  Time Frame for Short Term Goals: 10 visits  Short Term Goal 1: Perform bed mobility and functional transfers Mod I  Short Term Goal 2: Perform HEP independently  Short Term Goal 3: Ambulate 300ft with SPC or RW and supervision  Short Term Goal 4: Ascend/descend 20 steps with HR and supervision       Education  Patient Education  Education Given To: Patient  Education Provided: Role of Therapy;Plan of Care;Home Exercise Program  Education Provided Comments: Reviewed piriformis stretching and proper dosage, purpose of hip and core strengthening  Education Method: Demonstration;Verbal  Barriers to Learning: None  Education Outcome: Verbalized understanding;Demonstrated understanding      Therapy Time   Individual Concurrent Group Co-treatment   Time In 1313         Time Out 1351         Minutes [id number] [facility],

## 2022-10-25 VITALS
HEART RATE: 70 BPM | WEIGHT: 188.93 LBS | TEMPERATURE: 98.2 F | RESPIRATION RATE: 20 BRPM | DIASTOLIC BLOOD PRESSURE: 78 MMHG | OXYGEN SATURATION: 97 % | SYSTOLIC BLOOD PRESSURE: 125 MMHG | BODY MASS INDEX: 33.48 KG/M2 | HEIGHT: 63 IN

## 2022-10-25 LAB
GLUCOSE BLD-MCNC: 139 MG/DL (ref 65–105)
GLUCOSE BLD-MCNC: 196 MG/DL (ref 65–105)
GLUCOSE BLD-MCNC: 74 MG/DL (ref 65–105)

## 2022-10-25 PROCEDURE — 2580000003 HC RX 258: Performed by: STUDENT IN AN ORGANIZED HEALTH CARE EDUCATION/TRAINING PROGRAM

## 2022-10-25 PROCEDURE — 6370000000 HC RX 637 (ALT 250 FOR IP): Performed by: EMERGENCY MEDICINE

## 2022-10-25 PROCEDURE — 82947 ASSAY GLUCOSE BLOOD QUANT: CPT

## 2022-10-25 PROCEDURE — 6360000002 HC RX W HCPCS: Performed by: REGISTERED NURSE

## 2022-10-25 PROCEDURE — 6360000002 HC RX W HCPCS: Performed by: EMERGENCY MEDICINE

## 2022-10-25 RX ORDER — METHOCARBAMOL 750 MG/1
750 TABLET, FILM COATED ORAL 4 TIMES DAILY
Qty: 40 TABLET | Refills: 1 | Status: SHIPPED | OUTPATIENT
Start: 2022-10-25 | End: 2022-11-04

## 2022-10-25 RX ORDER — OXYCODONE HYDROCHLORIDE 5 MG/1
10 TABLET ORAL EVERY 4 HOURS PRN
Qty: 60 TABLET | Refills: 0 | Status: ON HOLD | OUTPATIENT
Start: 2022-10-25 | End: 2022-11-23 | Stop reason: HOSPADM

## 2022-10-25 RX ORDER — DIAZEPAM 2 MG/1
2 TABLET ORAL EVERY 6 HOURS PRN
Qty: 20 TABLET | Refills: 0 | Status: SHIPPED | OUTPATIENT
Start: 2022-10-25 | End: 2022-11-30

## 2022-10-25 RX ORDER — KETOROLAC TROMETHAMINE 15 MG/ML
15 INJECTION, SOLUTION INTRAMUSCULAR; INTRAVENOUS EVERY 6 HOURS
Status: DISCONTINUED | OUTPATIENT
Start: 2022-10-25 | End: 2022-10-25 | Stop reason: HOSPADM

## 2022-10-25 RX ORDER — NAPROXEN 500 MG/1
500 TABLET ORAL 2 TIMES DAILY WITH MEALS
Qty: 60 TABLET | Refills: 0 | Status: SHIPPED | OUTPATIENT
Start: 2022-10-25 | End: 2022-11-08 | Stop reason: ALTCHOICE

## 2022-10-25 RX ADMIN — GLIPIZIDE 10 MG: 10 TABLET ORAL at 17:14

## 2022-10-25 RX ADMIN — METHOCARBAMOL TABLETS 750 MG: 750 TABLET, COATED ORAL at 17:14

## 2022-10-25 RX ADMIN — BUSPIRONE HYDROCHLORIDE 10 MG: 10 TABLET ORAL at 17:14

## 2022-10-25 RX ADMIN — SERTRALINE 50 MG: 50 TABLET, FILM COATED ORAL at 09:54

## 2022-10-25 RX ADMIN — GLIPIZIDE 10 MG: 10 TABLET ORAL at 09:55

## 2022-10-25 RX ADMIN — KETOROLAC TROMETHAMINE 15 MG: 15 INJECTION, SOLUTION INTRAMUSCULAR; INTRAVENOUS at 17:56

## 2022-10-25 RX ADMIN — DEXAMETHASONE 4 MG: 4 TABLET ORAL at 09:55

## 2022-10-25 RX ADMIN — KETOROLAC TROMETHAMINE 15 MG: 15 INJECTION, SOLUTION INTRAMUSCULAR; INTRAVENOUS at 09:56

## 2022-10-25 RX ADMIN — LISINOPRIL 40 MG: 20 TABLET ORAL at 09:59

## 2022-10-25 RX ADMIN — BUSPIRONE HYDROCHLORIDE 10 MG: 10 TABLET ORAL at 09:55

## 2022-10-25 RX ADMIN — Medication 81 MG: at 09:56

## 2022-10-25 RX ADMIN — FUROSEMIDE 20 MG: 20 TABLET ORAL at 09:55

## 2022-10-25 RX ADMIN — SODIUM CHLORIDE, PRESERVATIVE FREE 10 ML: 5 INJECTION INTRAVENOUS at 10:05

## 2022-10-25 RX ADMIN — PANTOPRAZOLE SODIUM 40 MG: 40 TABLET, DELAYED RELEASE ORAL at 09:54

## 2022-10-25 RX ADMIN — OXYCODONE 10 MG: 5 TABLET ORAL at 17:14

## 2022-10-25 RX ADMIN — OXYCODONE 10 MG: 5 TABLET ORAL at 09:56

## 2022-10-25 RX ADMIN — METHOCARBAMOL TABLETS 750 MG: 750 TABLET, COATED ORAL at 13:13

## 2022-10-25 RX ADMIN — GABAPENTIN 300 MG: 300 CAPSULE ORAL at 09:56

## 2022-10-25 ASSESSMENT — PAIN DESCRIPTION - LOCATION
LOCATION: BUTTOCKS;HIP;LEG
LOCATION: LEG;HIP

## 2022-10-25 ASSESSMENT — PAIN DESCRIPTION - PAIN TYPE: TYPE: CHRONIC PAIN

## 2022-10-25 ASSESSMENT — PAIN SCALES - GENERAL
PAINLEVEL_OUTOF10: 6
PAINLEVEL_OUTOF10: 9
PAINLEVEL_OUTOF10: 9

## 2022-10-25 ASSESSMENT — PAIN DESCRIPTION - DESCRIPTORS
DESCRIPTORS: ACHING;DULL
DESCRIPTORS: ACHING

## 2022-10-25 ASSESSMENT — PAIN DESCRIPTION - ORIENTATION
ORIENTATION: LEFT
ORIENTATION: LEFT

## 2022-10-25 ASSESSMENT — PAIN DESCRIPTION - ONSET: ONSET: ON-GOING

## 2022-10-25 ASSESSMENT — PAIN DESCRIPTION - FREQUENCY: FREQUENCY: CONTINUOUS

## 2022-10-25 NOTE — DISCHARGE INSTRUCTIONS
[facility] for Pain Management  Dr Lilliana Mariscal Nov 3, 9:45      [facility] [phone]

## 2022-10-25 NOTE — CARE COORDINATION
Spoke with Tatyana Washington from [employer] - cancelled prior authorization d/t patient's updated physician progress notes.

## 2022-10-25 NOTE — CARE COORDINATION
Transition planning  ANGEL and Dr. Jeremy Bowles speak with patient at bedside re: discharge plans. CM informs patient that per Dr. Mario Suarez patient has progressed with therapies and is now too high functioning for acute rehab. Discussed that patient ambulated 500 feet with PT yesterday and it is very likely that insurance would not approve ARU. Patient states she is upset and was hoping to go to ARU as she was there recently, she also states she has been going to outpatient therapy for a year now but feels she needs more intense therapy. Neuro-surgery has referred her to Dr. Edgard Lizarraga at [facility] for pain management. Discussed patient making an appt as soon as possible when she is discharged so she can continue outpatient therapy. Patient is agreeable to this plan.

## 2022-10-25 NOTE — CARE COORDINATION
Spoke with CM: pt. Might be too high level. Writer contacted Dr. Marci Allred for further input.  Will continue to follow

## 2022-10-25 NOTE — PROGRESS NOTES
SPIRITUAL CARE DEPARTMENT - [address] [address]  PROGRESS NOTE    Shift date: 10/25/2022   Shift day: Tuesday   Shift # 1    Room # 0108/0108-01   Name: Ray Rooney                Episcopal: [address] of Islam: [facility]    Referral: Routine Visit    Admit Date & Time: 10/14/2022  5:41 PM    Assessment:  Ray Rooney is a 65 y.o. female. Patient engaged well in conversation as she explained her health issues and concerns. Pt stated that her biggest concern is getting into rehab at [facility] as she feels it will make a real difference for her. Pt also spoke of her family and her zion. Intervention:  Writer introduced self and title as  Writer offered space for patient  to express feelings, needs, and concerns and provided a ministry presence.  assured her of prayers for all of her concerns. Outcome:  Patient expressed gratitude for visit and support. Plan:  Chaplains will remain available to offer spiritual and emotional support as needed. Electronically signed by Hang Kearney on 10/25/2022 at 10:08 AM.  [facility]  [phone]        10/25/22 1003   Encounter Summary   Service Provided For: Patient   [facility]   Support System Spouse; Children   Last Encounter  10/25/22   Complexity of Encounter Moderate   Begin Time 0932   End Time  0950   Total Time Calculated 18 min   Encounter    Type Follow up   Assessment/Intervention/Outcome   Assessment Coping; Anxious; Fearful   Intervention Active listening;Discussed belief system/Orthodoxy practices/zion; Explored/Affirmed feelings, thoughts, concerns; Discussed illness injury and its impact;Prayer (assurance of)/Crum   Outcome Engaged in conversation;Expressed feelings, needs, and concerns;Comfort;Expressed Gratitude;Receptive

## 2022-10-25 NOTE — PROGRESS NOTES
Reviewed patient's chart. She has progressed with therapies and is now too high functioning for acute rehab. She ambulated 500 feet with supervision during PT yesterday. Would recommend lower level of rehab.       Estela Kruse MD

## 2022-10-25 NOTE — PROGRESS NOTES
RN went over all discharge instructions with pt and spouse at bedside. All questions answered in full. Pt discharged with all personal belongings and wheeled to front entrance via wheelchair.

## 2022-10-25 NOTE — PROGRESS NOTES
Occupational [address]  Occupational Therapy Not Seen Note    DATE: 10/25/2022    NAME: Marco Antonio Borja  MRN: [id number]   : 1960      Patient not seen this date for Occupational Therapy due to: Dr jasso/pt    Next Scheduled Treatment: 10/26/22    Electronically signed by LUCILLE Albarado on 10/25/2022 at 4:14 PM

## 2022-10-25 NOTE — PLAN OF CARE
Problem: Pain  Goal: Verbalizes/displays adequate comfort level or baseline comfort level  Outcome: Completed     Problem: Safety - Adult  Goal: Free from fall injury  Outcome: Completed     Problem: Discharge Planning  Goal: Discharge to home or other facility with appropriate resources  Outcome: Completed

## 2022-10-25 NOTE — PLAN OF CARE
Problem: Pain  Goal: Verbalizes/displays adequate comfort level or baseline comfort level  10/25/2022 0325 by Deisi Boyle RN  Outcome: Progressing  10/24/2022 1736 by Eriberto Amaro RN  Outcome: Progressing     Problem: Safety - Adult  Goal: Free from fall injury  10/25/2022 0325 by Deisi Boyle RN  Outcome: Progressing  10/24/2022 1736 by Eriberto Amaro RN  Outcome: Progressing     Problem: Discharge Planning  Goal: Discharge to home or other facility with appropriate resources  10/25/2022 0325 by Deisi Boyle RN  Outcome: Progressing  10/24/2022 1736 by Eriberto Amaro RN  Outcome: Progressing

## 2022-10-25 NOTE — PROGRESS NOTES
OBS/CDU   Attending NOTE      Patients PCP is: No primary care provider on file. SUBJECTIVE      Patient with persistent pain. Patient was able to ambulate yesterday but states that she is still significantly uncomfortable. Patient very frustrated with situation. Patient has good functional ability but significant discomfort. Will discuss with case management and patient alternatives to acute rehab placement. Patient currently uncomfortable and anxious about her pain control and prognosis. PHYSICAL EXAM      General: NAD, AO X 3  Heent: EMOI, PERRL  Neck: SUPPLE, NO JVD  Cardiovascular: RRR, S1S2  Pulmonary: CTAB, NO SOB  Abdomen: SOFT, NTTP, ND, +BS  Extremities: +2/4 PULSES DISTAL, NO SWELLING  Neuro / Psych: NO NUMBNESS OR TINGLING, MENTATION AT BASELINE    PERTINENT TEST /EXAMS      I have reviewed all available laboratory results.     MEDICATIONS CURRENT   ketorolac (TORADOL) injection 15 mg, Q6H  methocarbamol (ROBAXIN) tablet 750 mg, Q8H PRN  Empagliflozin-metFORMIN HCl 5-1000 MG TABS 5 mg (Patient Supplied), BID  morphine injection 4 mg, Q2H PRN  oxyCODONE (ROXICODONE) immediate release tablet 10 mg, Q4H PRN  glucose chewable tablet 16 g, PRN  dextrose bolus 10% 125 mL, PRN   Or  dextrose bolus 10% 250 mL, PRN  glucagon (rDNA) injection 1 mg, PRN  dextrose 10 % infusion, Continuous PRN  insulin lispro (HUMALOG) injection vial 0-4 Units, TID WC  insulin lispro (HUMALOG) injection vial 0-4 Units, Nightly  [START ON 10/26/2022] dexamethasone (DECADRON) tablet 2 mg, Daily  albuterol sulfate HFA (PROVENTIL;VENTOLIN;PROAIR) 108 (90 Base) MCG/ACT inhaler 2 puff, Q6H PRN  aspirin EC tablet 81 mg, Daily  atorvastatin (LIPITOR) tablet 40 mg, Nightly  busPIRone (BUSPAR) tablet 10 mg, BID  diazePAM (VALIUM) tablet 2 mg, Q6H PRN  Dulaglutide SOPN 1.5 mg (Patient Supplied), Weekly  furosemide (LASIX) tablet 20 mg, Daily  gabapentin (NEURONTIN) capsule 300 mg, BID  glipiZIDE (GLUCOTROL) tablet 10 mg, BID AC  lisinopril (PRINIVIL;ZESTRIL) tablet 40 mg, Daily  pantoprazole (PROTONIX) tablet 40 mg, Daily  rOPINIRole (REQUIP) tablet 2 mg, Nightly  sertraline (ZOLOFT) tablet 50 mg, Daily  methocarbamol (ROBAXIN) tablet 750 mg, 4x Daily  sodium chloride flush 0.9 % injection 5-40 mL, 2 times per day  sodium chloride flush 0.9 % injection 5-40 mL, PRN  0.9 % sodium chloride infusion, PRN  potassium chloride (KLOR-CON M) extended release tablet 40 mEq, PRN   Or  potassium bicarb-citric acid (EFFER-K) effervescent tablet 40 mEq, PRN   Or  potassium chloride 10 mEq/100 mL IVPB (Peripheral Line), PRN  enoxaparin (LOVENOX) injection 40 mg, Daily  ondansetron (ZOFRAN) injection 4 mg, Q4H PRN  polyethylene glycol (GLYCOLAX) packet 17 g, Daily PRN  acetaminophen (TYLENOL) tablet 650 mg, Q6H PRN   Or  acetaminophen (TYLENOL) suppository 650 mg, Q6H PRN  morphine injection 4 mg, Once        All medication charted and reviewed. CONSULTS      IP CONSULT TO NEUROSURGERY  IP CONSULT TO FAMILY MEDICINE  IP CONSULT TO PHYSICAL MEDICINE REHAB    ASSESSMENT/PLAN       Gil Jackson is a 65 y.o. female who presents with back pain. Piriformis syndrome    Patient diagnosed with piriformis syndrome by neurosurgery. Outpatient physical therapy recommended  Patient for analgesics, muscle relaxants, physical therapy  Some improvement with manipulation. Ongoing monitoring and efforts of pain control and stretching. Uncontrolled pain. Patient with improved relief with Toradol. Patient with improved relief with Robaxin. Patient has [facility]  Discussed with patient case management treatment options.   Referral to outpatient pain management  Continue home medications and pain control  Monitor vitals, labs, and imaging  DISPO: pending consults and clinical improvement    --  Du Brooks MD  Emergency Medicine Attending Physician     This dictation was generated by voice recognition computer software. Although all attempts are made to edit the dictation for accuracy, there may be errors in the transcription that are not intended.

## 2022-10-26 ENCOUNTER — CARE COORDINATION (OUTPATIENT)
Dept: OTHER | Facility: CLINIC | Age: 62
End: 2022-10-26

## 2022-10-26 NOTE — CARE COORDINATION
[facility] Care Transitions Initial Follow Up Call    Call within 2 business days of discharge: Yes    Care Transition Nurse contacted the patient by telephone to perform post hospital discharge assessment. Verified name and  with patient as identifiers. Provided introduction to self, and explanation of the Care Transition Nurse role. Patient: Benito Powell Patient : 1960   MRN: [id number]  Reason for Admission: left sciatic nerve pain, intractable back pain  Discharge Date: 10/9/22 RARS: Readmission Risk Score: 9.4      Last Discharge  Street       Date Complaint Diagnosis Description Type Department Provider    22 Back Pain Sciatica of left side . .. ED to Hosp-Admission (Discharged) (ADMITTED) QUINCY Johnson MD; Viki Camargo... Was this an external facility discharge? No Discharge Facility: [facility] [address] to be reviewed by the provider   Additional needs identified to be addressed with provider: No  none               Method of communication with provider: none. Patient states she is not feeling 100%. Still having pain and feeling weak. Mostly laying down and sleeping. Patient states she is upset she couldn't go to acute rehab and have PT. Patient had Caudal SONY. States this helped her back pain but did not help the pain going down her leg. States she does have a diagnosis for this now- piriformis syndrome. States she was referred to pain management for this. Her appt is 11/3/2022 with Dr. Jarred Mcallister. Patient received medications before she left the hospital. States she is having no issues with medication and they are managing pain. Patient states both of her daughters are at the home to help. Patient also has cubital tunnel release scheduled for 2022. Care Transition Nurse reviewed discharge instructions and red flags with patient who verbalized understanding.  The patient was given an opportunity to ask questions and does not have any further questions or concerns at this time. Were discharge instructions available to patient? Yes. Reviewed appropriate site of care based on symptoms and resources available to patient including: PCP  Specialist  Benefits related nurse triage line  Urgent care clinics  [facility] 24/7  When to call [address] The patient agrees to contact the PCP office for questions related to their healthcare. Advance Care Planning:   Does patient have an Advance Directive: not on file. Medication reconciliation was performed with patient, who verbalizes understanding of administration of home medications.  Medications reviewed, 1111F entered: N/A    Was patient discharged with a pulse oximeter? no    Non-face-to-face services provided:  Obtained and reviewed discharge summary and/or continuity of care documents    Offered patient enrollment in the Remote Patient Monitoring (RPM) program for in-home monitoring: NA.    Care Transitions 24 Hour Call    Schedule Follow Up Appointment with PCP: Completed  Do you have a copy of your discharge instructions?: Yes  Do you have all of your prescriptions and are they filled?: Yes (Comment: Currently being filled at the pharmacy at [facility] her daughter will be picking them up shortly)  Have you been contacted by a [address]?: No  Have you scheduled your follow up appointment?: Yes  How are you going to get to your appointment?: Car - family or friend to transport  Do you have support at home?: Partner/Spouse/SO, Child  Do you feel like you have everything you need to keep you well at home?: Yes  Are you an active caregiver in your home?: Yes  Care Transitions Interventions         Follow Up  Future Appointments   Date Time Provider Nicola Zaidi   11/9/2022  2:00 PM Omkar Cast MD [facility]   12/8/2022 10:00 AM ORLY Hui - CNP [facility]   1/20/2023 10:30 AM Basia Madsen. [address] [facility] [address] Transition Nurse provided contact information. Plan for follow up call in 7-10 days  based on severity of symptoms and risk factors.   Plan for next call: follow-up appointment-pain management    Lynne Babcock RN

## 2022-10-26 NOTE — PROGRESS NOTES
CLINICAL PHARMACY NOTE: MEDS TO BEDS    Total # of Prescriptions Filled: 4   The following medications were delivered to the patient:  Methocarbamol  Naproxen  Oxycodone  Diazepam    Additional Documentation:   Delivered by  to room 108 $40 collected via [employer]

## 2022-10-30 NOTE — DISCHARGE SUMMARY
CDU Discharge Summary        Patient:  Marlene Cai  YOB: 1960    MRN: [id number]   Acct: [de-identified]    Primary Care Physician: Elana Le MD    Admit date:  10/14/2022  5:41 PM  Discharge date: 10/25/2022  6:49 PM      Discharge Diagnoses:     1.)  Intractable back pain. Outpatient treatment failure treated with IV and oral analgesics. Evaluated by physical therapy and Occupational Therapy. Evaluated by [employer]. Patient treated symptomatically. Improved after steroid treatment. Follow-up:  Call today/tomorrow for a follow up appointment with Elana Le MD , or return to the Emergency Room with worsening symptoms    Stressed to patient the importance of following up with primary care doctor for further workup/management of symptoms. Pt verbalizes understanding and agrees with plan. Discharge Medication Changes:       Medication List        START taking these medications      methocarbamol 750 MG tablet  Commonly known as: ROBAXIN  Take 1 tablet by mouth 4 times daily for 10 days     naproxen 500 MG tablet  Commonly known as: Naprosyn  Take 1 tablet by mouth 2 times daily (with meals)            CHANGE how you take these medications      * oxyCODONE 5 MG immediate release tablet  Commonly known as: ROXICODONE  What changed: Another medication with the same name was added. Make sure you understand how and when to take each. * oxyCODONE 5 MG immediate release tablet  Commonly known as: ROXICODONE  Take 2 tablets by mouth every 4 hours as needed for Pain for up to 30 days. What changed: You were already taking a medication with the same name, and this prescription was added. Make sure you understand how and when to take each. * This list has 2 medication(s) that are the same as other medications prescribed for you. Read the directions carefully, and ask your doctor or other care provider to review them with you.                 CONTINUE taking these medications aspirin 81 MG EC tablet     atorvastatin 40 MG tablet  Commonly known as: LIPITOR     bimatoprost 0.01 % Soln ophthalmic drops  Commonly known as: LUMIGAN     busPIRone 10 MG tablet  Commonly known as: BUSPAR     celecoxib 100 MG capsule  Commonly known as: CELEBREX     cyclobenzaprine 10 MG tablet  Commonly known as: FLEXERIL     diazePAM 2 MG tablet  Commonly known as: VALIUM  Take 1 tablet by mouth every 6 hours as needed for Anxiety for up to 20 doses.      diclofenac sodium 1 % Gel  Commonly known as: VOLTAREN     docusate sodium 100 MG capsule  Commonly known as: COLACE     furosemide 20 MG tablet  Commonly known as: LASIX     gabapentin 300 MG capsule  Commonly known as: NEURONTIN     glipiZIDE 10 MG tablet  Commonly known as: GLUCOTROL     lisinopril 40 MG tablet  Commonly known as: PRINIVIL;ZESTRIL     magnesium oxide 400 MG tablet  Commonly known as: MAG-OX     omeprazole 20 MG delayed release capsule  Commonly known as: PRILOSEC     rOPINIRole 2 MG tablet  Commonly known as: REQUIP     sertraline 50 MG tablet  Commonly known as: ZOLOFT     Synjardy 5-1000 MG Tabs  Generic drug: Empagliflozin-metFORMIN HCl     Trulicity 1.5 YJ/6.7XB SC injection  Generic drug: dulaglutide     Ventolin  (90 Base) MCG/ACT inhaler  Generic drug: albuterol sulfate HFA               Where to Get Your Medications        These medications were sent to [facility] [address] [place] [address]      Phone: [phone]   diazePAM 2 MG tablet  methocarbamol 750 MG tablet  naproxen 500 MG tablet  oxyCODONE 5 MG immediate release tablet         Diet:  No diet orders on file, advance as tolerated     Activity:  As tolerated    Consultants: IP CONSULT TO NEUROSURGERY  IP CONSULT TO FAMILY MEDICINE  IP CONSULT TO PHYSICAL MEDICINE REHAB    Procedures:  Not indicated      Diagnostic Test:   Results for orders placed or performed during the hospital encounter of 10/14/22   COVID-19, Rapid    Specimen: Nasopharyngeal Swab   Result Value Ref Range    Specimen Description . NASOPHARYNGEAL SWAB     SARS-CoV-2, Rapid Not Detected Not Detected   CBC with Auto Differential   Result Value Ref Range    WBC 13.3 (H) 3.5 - 11.3 k/uL    RBC 4.91 3.95 - 5.11 m/uL    Hemoglobin 13.5 11.9 - 15.1 g/dL    Hematocrit 42.1 36.3 - 47.1 %    MCV 85.7 82.6 - 102.9 fL    MCH 27.5 25.2 - 33.5 pg    MCHC 32.1 28.4 - 34.8 g/dL    RDW 14.2 11.8 - 14.4 %    Platelets 736 922 - 510 k/uL    MPV 10.3 8.1 - 13.5 fL    NRBC Automated 0.0 0.0 per 100 WBC    Seg Neutrophils 63 36 - 65 %    Lymphocytes 22 (L) 24 - 43 %    Monocytes 10 3 - 12 %    Eosinophils % 4 1 - 4 %    Basophils 1 0 - 2 %    Immature Granulocytes 0 0 %    Segs Absolute 8.43 (H) 1.50 - 8.10 k/uL    Absolute Lymph # 2.94 1.10 - 3.70 k/uL    Absolute Mono # 1.28 (H) 0.10 - 1.20 k/uL    Absolute Eos # 0.50 (H) 0.00 - 0.44 k/uL    Basophils Absolute 0.06 0.00 - 0.20 k/uL    Absolute Immature Granulocyte 0.05 0.00 - 0.30 k/uL   Basic Metabolic Panel   Result Value Ref Range    Glucose 149 (H) 70 - 99 mg/dL    BUN 15 8 - 23 mg/dL    Creatinine 0.83 0.50 - 0.90 mg/dL    Est, Glom Filt Rate >60 >60 mL/min/1.73m2    Calcium 9.3 8.6 - 10.4 mg/dL    Sodium 137 135 - 144 mmol/L    Potassium 4.5 3.7 - 5.3 mmol/L    Chloride 101 98 - 107 mmol/L    CO2 25 20 - 31 mmol/L    Anion Gap 11 9 - 17 mmol/L   Protime-INR   Result Value Ref Range    Protime 10.2 9.1 - 12.3 sec    INR 0.9    APTT   Result Value Ref Range    PTT 20.2 (L) 20.5 - 30.5 sec   Basic Metabolic Panel w/ Reflex to MG   Result Value Ref Range    Glucose 159 (H) 70 - 99 mg/dL    BUN 33 (H) 8 - 23 mg/dL    Creatinine 1.08 (H) 0.50 - 0.90 mg/dL    Est, Glom Filt Rate 58 (L) >60 mL/min/1.73m2    Calcium 8.9 8.6 - 10.4 mg/dL    Sodium 138 135 - 144 mmol/L    Potassium 4.6 3.7 - 5.3 mmol/L    Chloride 102 98 - 107 mmol/L    CO2 22 20 - 31 mmol/L    Anion Gap 14 9 - 17 mmol/L POC Glucose Fingerstick   Result Value Ref Range    POC Glucose 128 (H) 65 - 105 mg/dL   POC Glucose Fingerstick   Result Value Ref Range    POC Glucose 112 (H) 65 - 105 mg/dL   POC Glucose Fingerstick   Result Value Ref Range    POC Glucose 204 (H) 65 - 105 mg/dL   POC Glucose Fingerstick   Result Value Ref Range    POC Glucose 69 65 - 105 mg/dL   POC Glucose Fingerstick   Result Value Ref Range    POC Glucose 86 65 - 105 mg/dL   POC Glucose Fingerstick   Result Value Ref Range    POC Glucose 162 (H) 65 - 105 mg/dL   POC Glucose Fingerstick   Result Value Ref Range    POC Glucose 153 (H) 65 - 105 mg/dL   POC Glucose Fingerstick   Result Value Ref Range    POC Glucose 229 (H) 65 - 105 mg/dL   POC Glucose Fingerstick   Result Value Ref Range    POC Glucose 248 (H) 65 - 105 mg/dL   POC Glucose Fingerstick   Result Value Ref Range    POC Glucose 260 (H) 65 - 105 mg/dL   POC Glucose Fingerstick   Result Value Ref Range    POC Glucose 258 (H) 65 - 105 mg/dL   POC Glucose Fingerstick   Result Value Ref Range    POC Glucose 285 (H) 65 - 105 mg/dL   POC Glucose Fingerstick   Result Value Ref Range    POC Glucose 248 (H) 65 - 105 mg/dL   POC Glucose Fingerstick   Result Value Ref Range    POC Glucose 283 (H) 65 - 105 mg/dL   POC Glucose Fingerstick   Result Value Ref Range    POC Glucose 169 (H) 65 - 105 mg/dL   POC Glucose Fingerstick   Result Value Ref Range    POC Glucose 287 (H) 65 - 105 mg/dL   POC Glucose Fingerstick   Result Value Ref Range    POC Glucose 249 (H) 65 - 105 mg/dL   POC Glucose Fingerstick   Result Value Ref Range    POC Glucose 236 (H) 65 - 105 mg/dL   POC Glucose Fingerstick   Result Value Ref Range    POC Glucose 233 (H) 65 - 105 mg/dL   POC Glucose Fingerstick   Result Value Ref Range    POC Glucose 291 (H) 65 - 105 mg/dL   POC Glucose Fingerstick   Result Value Ref Range    POC Glucose 251 (H) 65 - 105 mg/dL   POC Glucose Fingerstick   Result Value Ref Range    POC Glucose 336 (H) 65 - 105 mg/dL POC Glucose Fingerstick   Result Value Ref Range    POC Glucose 317 (H) 65 - 105 mg/dL   POC Glucose Fingerstick   Result Value Ref Range    POC Glucose 257 (H) 65 - 105 mg/dL   POC Glucose Fingerstick   Result Value Ref Range    POC Glucose 173 (H) 65 - 105 mg/dL   POC Glucose Fingerstick   Result Value Ref Range    POC Glucose 124 (H) 65 - 105 mg/dL   POC Glucose Fingerstick   Result Value Ref Range    POC Glucose 106 (H) 65 - 105 mg/dL   POC Glucose Fingerstick   Result Value Ref Range    POC Glucose 321 (H) 65 - 105 mg/dL   POC Glucose Fingerstick   Result Value Ref Range    POC Glucose 173 (H) 65 - 105 mg/dL   POC Glucose Fingerstick   Result Value Ref Range    POC Glucose 236 (H) 65 - 105 mg/dL   POC Glucose Fingerstick   Result Value Ref Range    POC Glucose 199 (H) 65 - 105 mg/dL   POC Glucose Fingerstick   Result Value Ref Range    POC Glucose 364 (H) 65 - 105 mg/dL   POC Glucose Fingerstick   Result Value Ref Range    POC Glucose 78 65 - 105 mg/dL   POC Glucose Fingerstick   Result Value Ref Range    POC Glucose 90 65 - 105 mg/dL   POC Glucose Fingerstick   Result Value Ref Range    POC Glucose 190 (H) 65 - 105 mg/dL   POC Glucose Fingerstick   Result Value Ref Range    POC Glucose 218 (H) 65 - 105 mg/dL   POC Glucose Fingerstick   Result Value Ref Range    POC Glucose 74 65 - 105 mg/dL   POC Glucose Fingerstick   Result Value Ref Range    POC Glucose 139 (H) 65 - 105 mg/dL   POC Glucose Fingerstick   Result Value Ref Range    POC Glucose 196 (H) 65 - 105 mg/dL     No results found. Physical Exam:    General appearance - NAD, AOx 3    Lungs -CTAB, no R/R/R  Heart - RRR, no M/R/G  Abdomen - Soft, NT/ND  Neurological:  MAEx4, No focal motor deficit, sensory loss  Extremities - Cap refil <2 sec in all ext., no edema  Skin -warm, dry      Hospital Course:  Clinical course has improved, labs and imaging reviewed.      Liang Simpson originally presented to the hospital on 10/14/2022  5:41 PM with intractable back pain, outpatient treatment failure. At that time it was determined that She required further observation and supportive therapy and IV and oral analgesics. Patient required multiple rounds of analgesia and was treated with oral and IV analgesics muscle relaxants and ultimately steroids. Patient was evaluated by Islam. Patient was ultimately discharged after some symptom relief. Patient and outpatient follow-up arranged. . Labs and imaging were followed daily. Imaging results as above. She is medically stable to be discharged. Disposition: Home    Patient stated that they will not drive themselves home from the hospital if they have gotten pain killers/ narcotics earlier that day and that they will arrange for transportation on their own or work with the  for a ride. Patient counseled NOT to drive while under the influence of narcotics/ pain killers. Condition: Good    Patient stable and ready for discharge home. I have discussed plan of care with patient and they are in understanding. They were instructed to read discharge paperwork. All of their questions and concerns were addressed. Time Spent: 10 day      --  Humble Peters MD  Emergency Medicine Attending Physician    This dictation was generated by voice recognition computer software. Although all attempts are made to edit the dictation for accuracy, there may be errors in the transcription that are not intended.

## 2022-11-02 PROBLEM — M54.32 LEFT SCIATIC NERVE PAIN: Status: ACTIVE | Noted: 2022-10-14

## 2022-11-02 PROBLEM — G57.02 PIRIFORMIS SYNDROME OF LEFT SIDE: Status: ACTIVE | Noted: 2022-11-02

## 2022-11-04 NOTE — DISCHARGE SUMMARY
[] [place]       Occupational Therapy             1st floor       [address], [place]         Phone: [phone]       Fax: [phone] [] [facility]  [address] [place], [place]  Phone: [phone]  Fax: [phone]         Occupational Therapy Discharge Note    Date: 2022      Patient: Liang Simpson  : 1960  MRN: [id number]    Referring Provider:  Felix Guan MD  Insurance: Other 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 Abby Cortes: 22  Visit# / total visits: ; Progress note for Medicare patient due at visit 8-9                       Total visits attended:  Cancels/No shows: 0/1  Date of initial visit: 22                Date of final visit: 22      Subjective  See daily note dated  for details   Pain:  [] Yes  [] No  Location:  N/A Pain Rating: (0-10 scale) /10  Pain altered Tx:  [] No  [] Yes  Action:  Comments:    Objective:See IE dated  for details  Test Measurements:  Function:    Assessment:See IE dated  for details  Short Term Goals: (  8    Treatments)  Decrease numbness/tingling per subjective report   Increase strength (pounds);  strength by 5 lbs  Increase pinch strength by 3lbs  Pt will increase 9-hole pef test by 10 seconds on each UE  Increase function:UE Functional Index Score 8 or more points to promote increased functional abilities  Demo knowledge of fall prevention in 2 sessions. Patient to be independent with home exercise program as demonstrated by performance with correct form without cues.      Long Term Goals: (  16  Treatments)  Pt will increase 9 hole peg test time by 15 seconds  Pt will improve UEFI score by 15 points  Pt will increase  strength by 10 lbs  Pt will increase pinch strength by 5 lbs    Treatment to Date:     [x]  Therapeutic Exercise   94181              []  Iontophoresis: 4 mg/mL Dexamethasone Sodium Phosphate  mAmin  24494    [x]  Therapeutic Activity  93314  []  Vasopneumatic cold with compression  04402                []  ADL training  59657  []  Ultrasound        73326    []  Neuromuscular Re-education  06327  []  Electrical Stimulation Attended  02992    [x]  Manual Therapy  83319  Orthotic    []  Fit  47924     []  Train [id number]    []  Instruction in HEP        Prosthetic    []  Fit [id number]      []  Train [id number]    []  Cognitive Interventions, (first 15 min 14282, subsequent 15 min 34685)  []  Cold/hotpack      [x]  Massage   85751             Discharge Status:     [] Pt recovered from conditions. Treatment goals were met. [] Pt received maximum benefit. No further therapy indicated at this time. [] Pt to continue exercise/home instructions independently. [x] Therapy interrupted due to: Pt experienced increase in LBP w/ radiating symptoms done LE. Pt went to ED d/t LBP. Per chart review, pt canceled initial cubital tunnel release and has been rescheduled. Pt will be DC from formal outpatient OT at this time. Pt can call to schedule appointments as needed. [] Pt has 2 or more no shows/cancels, is discontinued per our policy. [] Pt has completed prescribed number of treatment sessions. [] Other:         Electronically signed by: Ty Vail OTR/L    If you have any questions or concerns, please don't hesitate to call.   Thank you for your referral.

## 2022-11-08 ENCOUNTER — OFFICE VISIT (OUTPATIENT)
Dept: FAMILY MEDICINE CLINIC | Age: 62
End: 2022-11-08

## 2022-11-08 ENCOUNTER — HOSPITAL ENCOUNTER (OUTPATIENT)
Dept: OCCUPATIONAL THERAPY | Age: 62
Setting detail: THERAPIES SERIES
Discharge: HOME | End: 2022-11-08
Payer: COMMERCIAL

## 2022-11-08 ENCOUNTER — HOSPITAL ENCOUNTER (OUTPATIENT)
Dept: PHYSICAL THERAPY | Age: 62
Setting detail: THERAPIES SERIES
Discharge: HOME | End: 2022-11-08
Payer: COMMERCIAL

## 2022-11-08 VITALS
DIASTOLIC BLOOD PRESSURE: 84 MMHG | SYSTOLIC BLOOD PRESSURE: 132 MMHG | OXYGEN SATURATION: 96 % | BODY MASS INDEX: 31.96 KG/M2 | HEIGHT: 63 IN | HEART RATE: 75 BPM | WEIGHT: 180.4 LBS

## 2022-11-08 DIAGNOSIS — Z09 HOSPITAL DISCHARGE FOLLOW-UP: ICD-10-CM

## 2022-11-08 DIAGNOSIS — Z12.11 SCREEN FOR COLON CANCER: ICD-10-CM

## 2022-11-08 DIAGNOSIS — E11.9 TYPE 2 DIABETES MELLITUS WITHOUT COMPLICATION, WITHOUT LONG-TERM CURRENT USE OF INSULIN (HCC): Primary | ICD-10-CM

## 2022-11-08 LAB — HBA1C MFR BLD: 7.7 %

## 2022-11-08 PROCEDURE — 97168 OT RE-EVAL EST PLAN CARE: CPT

## 2022-11-08 PROCEDURE — 97140 MANUAL THERAPY 1/> REGIONS: CPT

## 2022-11-08 PROCEDURE — 97110 THERAPEUTIC EXERCISES: CPT

## 2022-11-08 RX ORDER — ASPIRIN 81 MG/1
TABLET, COATED ORAL
Qty: 30 TABLET | Refills: 2 | Status: SHIPPED | OUTPATIENT
Start: 2022-11-08

## 2022-11-08 ASSESSMENT — ENCOUNTER SYMPTOMS
SHORTNESS OF BREATH: 0
DIARRHEA: 0

## 2022-11-08 NOTE — PROGRESS NOTES
[] [address]  [address]  [phone]  F: [phone]  [x] [facility] ([facility]) @ [facility]  [address] [place] [address] [address], [address] [address]  Phone [phone]  Fax [phone]        Occupational Therapy Progress Note    Date: 2022      Patient: Kailash Gambino  : 1960  MRN: [id number]    Referring Provider:  Temo Velarde MD  Insurance: [employer] approved for 12 visits (-)  Medical Diagnosis: Retrolisthesis of vertebrae (M43.10) Bilateral Carpal Tunnel (G56.03)     Rehab Codes: pain in right upper arm M79.621,, pain in left upper arm M79.622, , lack of coordination unspecified R27.0,, or parasthesia of skin R20.2,  Onset Date: 22                 Next Dr. Brewster Car: 22  Visit# / total visits: ; Progress note for Medicare patient due at visit 8-9    Total visits attended: 8  Cancels/No shows:   Date range of services:  to     Subjective:  Pain:  [x] Yes  [] No  Location:  N/A Pain Rating: (0-10 scale) 0/10 Pain altered Tx:  [x] No  [] Yes  Action:  Comments: Pt reports he has regressed. She states she was in [facility]. V's. Pt states she is no longer having any pn in her L UE but her forearm and hand are still numb    Objective:  Test Measurements:Tests/Measurements: Upper Extremity Functional Index  Current Functional Level:  17/80 functionally impaired as measured with the Upper Extremity Functional Index Survey. 0-80 scale, with 80 = no Deficits  (The UEFI model does not provide any specific cut off points that could classify the upper limb disability degree, however, a minimal detectable change of 9 points is provided. This means that for improvement or deterioration to be considered, between two subsequent evaluations, the scores must differ by at least 9 points.)    Function  UE ROM/MMT   Right Left MMT Right MMT Left   Shoulder       Flexion [facility] 5/5 3/5 Extension [facility] WFL 5/5 4/5   Adduction [facility] WFL 5/5 4/5   Abduction [facility] WFL 5/5 4/5   Internal Rotation [facility] WFL 5/5 4/5   External Rotation [facility] WFL 5/5 4/5   Elbow       Flexion [facility] WFL 5/5 4/5   Extension [facility] WFL 5/5 4/5   Pronation [facility] WFL 5/5 4/5   Supination  [facility] 5/5 4/5   Wrist       Flexion [facility] 5/5 4/5   Extension [facility] 5/5 4/5   Radial deviation [facility] 5/5 5/5   Ulnar deviation WFL WFL 5/5 3/5         COORDINATION  - Fine Motor (speed/dexterity)    Right in seconds Percentile Left in seconds Percentile   9 Hole Peg Test 30 sec   45        STRENGTH                   Right   (pounds) Left   (pounds)    Position 1 20 7 (35%)    position 2 35 12 (34%)   Lateral pinch 10 4 (40%)   2 point pinch 6 2 (33%)   3 jaw pinch 7 2 (28%)      Bilateral  strength is normally symmetrical or up to 10% stronger on the dominant extremity, depending on the individual's physically activity level. Assessment:  Short Term Goals: (  8    Treatments)  Decrease numbness/tingling per subjective report   Increase strength (pounds);  strength by 5 lbs  Increase pinch strength by 3lbs  Pt will increase 9-hole pef test by 10 seconds on each UE  Increase function:UE Functional Index Score 8 or more points to promote increased functional abilities  Demo knowledge of fall prevention in 2 sessions. Patient to be independent with home exercise program as demonstrated by performance with correct form without cues.      Long Term Goals: (  16  Treatments)  Pt will increase 9 hole peg test time by 15 seconds  Pt will improve UEFI score by 15 points  Pt will increase  strength by 10 lbs  Pt will increase pinch strength by 5 lbs      Treatment Plan:   [x]  Therapeutic Exercise   16230              []  Iontophoresis: 4 mg/mL Dexamethasone Sodium Phosphate  mAmin  60696    [x]  Therapeutic Activity  83755  []  Vasopneumatic cold with compression  82818                []  ADL training  03072  []  Ultrasound [id number]    []  Neuromuscular Re-education  [id number]  []  Electrical Stimulation Attended  B808082    [x]  Manual Therapy  72524  Orthotic    []  Fit  [id number]     []  Train [id number]    []  Instruction in HEP        Prosthetic    []  Fit [id number]      []  Train [id number]    []  Cognitive Interventions, (first 15 min 62041, subsequent 15 min 18185)  []  Cold/hotpack      [x]  Massage   35805           Assessment:Pt returned to outpatient OT to address pn, strength, and functional use of B UE. Pt was attending OT for cubital tunnel symptoms but was was held d/t being in the hospital for 20 days d/t LBP. Pt reports the pn in her L UE is gone, however she is still experiencing numbness into L hand. Formal strength testing indicate she has decreased  and pinch strength in L UE. [address] is improved from IE on 8/12/22. Pt is scheduled for L Cubital tunnel release on 11/23/22. POC to see pt for 2 visits prior to cubital tunnel release to focus on strengthening. Pt in agreement w/ this POC. Patient Status: (requested frequency/duration)     [x] Continue per initial/current plan of care 1-2 times per week for 2 remaining visits. [] Additional visits necessary. Electronically signed by COLLEEN Ponce on 11/8/2022 at 1:04 PM      If you have any questions or concerns, please don't hesitate to call. Thank you for your referral.    Physician Signature:________________________________Date:__________________  By signing above or cosigning this note, I have reviewed this plan of care and certify a need for medically necessary rehabilitation services.      *PLEASE SIGN ABOVE AND FAX BACK ALL PAGES

## 2022-11-08 NOTE — PATIENT INSTRUCTIONS
Thank you for letting us take care of you today. We hope all your questions were addressed. If a question was overlooked or something else comes to mind after you return home, please contact a member of your Care Team listed below. Your Care Team at [place] [address] is Team #2  Fernanda Luz DO (Faculty)  Angela Gutierrez (Faculty)  Bertha Gonzalez MD (Resident)  Kam Pacheco MD (Resident)  Julisa Tatum MD (Resident)  Sheridan Garcia MD (Resident)  Inga Thompson., CMA Lugene Bamberger.,  MA  Serafin Cheney., SHERRY Oden., [facility] office)  Lito Jarrell, [phone] (Clinical Practice Manager)  [facility] (Clinical Pharmacist)     Office phone number: [phone]    If you need to get in right away due to illness, please be advised we have \"Same Day\" appointments available Monday-Friday. Please call us at [phone] option #3 to schedule your \"Same Day\" appointment. Thank you for letting us take care of you today. We hope all your questions were addressed. If a question was overlooked or something else comes to mind after you return home, please contact a member of your Care Team listed below. Your Care Team at [place] [address] is Team #1  Alda Fields MD (Faculty)  Tanika Pritchett MD(Resident)  Kathrine Han MD (Resident)  Gay Silver DO (Resident)  Jovita Marcos [facility]  Radha Sherrell., [facility]  Serafin Cheney., SHERRY Turner., Aletha Malik., [facility] office)  Lito Jarrell, [phone] [employer] (Clinical Practice Manager)  [facility] (Clinical Pharmacist)     Office phone number: [phone]    If you need to get in right away due to illness, please be advised we have \"Same Day\" appointments available Monday-Friday. Please call us at [phone] option #3 to schedule your \"Same Day\" appointment.

## 2022-11-08 NOTE — PROGRESS NOTES
[facility]  [address] [place] [address]. [address]         Phone: [phone]       Fax: [phone]    Physical Therapy Progress Note    Date: 2022      Patient: Dale Reed  : 1960  MRN: [id number]    Physician: Alen Parks MD (resident physician)  Ricky Cui MD (attending physician)      Insurance: [employer]. Auth required after 30 visits. ([address] [place] [address] [address])   Diagnosis: M54.16 (ICD-10-CM) - Lumbar radiculopathy   Onset Date: 2021 with recent exacerbation 2022    Next Dr. Abdias Farah: 22 with neurology, 22 with neurosurgery, 22 with PCP  Total visits attended: 32/43  Cancels/No shows: 0/0  Date of initial visit: 22                    Subjective: Pt returns to therapy today after over a 1 month lapse in care secondary to pt recently getting re-admitted to the hospital again for intractable lumbar radicular pain which she states started after doing laundry and carrying a full laundry basket. Pt states that she was diagnosed with L piriformis syndrome and may be getting additional injections. Continues to express high pain levels but doing slightly better than when admitted in the hospital. Expresses interest in getting back into aquatic PT as soon as possible given that her insurance coverage will be ending at the end of December. Pt is also currently scheduled to undergo a cubital tunnel release on 22. Pain:  [x] Yes  [] No  Location: L posterior hip and thigh Pain Rating: (0-10 scale) 8/10 currently at rest  Pain altered Tx:  [x] No  [] Yes  Action:  Comments:  Progress note performed today for reporting period -.      Objective:     Range of Motion  Left Range of Motion  Right Strength  Left Strength  Right   Lumbar Flexion 25%  Increased pain in L hip/thigh 25% 3+/5 3+/5   Lumbar Extension 50%  Increase pain in L hip/thigh 75%  Low back stiffness 4/5 4/5   Lumbar Rotation 50%   25%  Increased pain in L hip/thigh 4/5 4/5   Lumbar Side Bend 90%   75%  Increased pain in L hip/thigh 4+/5 4+/5   Hip Flexion WNL for all below WNL for all below 3+/5 4/5   Hip Abduction     3+/5 4/5   Hip Adduction     4-/5 4/5   Hip Extension     3+/5 4/5   Hip ER           Hip IR           Knee Flexion     4-/5 4+/5   Knee Extension     3+/5 4+/5   Dorsiflexion     4+/5 4+/5   Plantar flexion     4+/5 4+/5   Inversion           Eversion           *All LE MMT performed in modified (seated) positioning    Today's Treatment  Exercise Reps/ Time Weight/ Level Completed  Today Comments   Manual 10'    x MFR to L glutes and piriformis    Sciatic nerve glides in 90-90 10x   x Towel behind L knee   Piriformis stretch 30'' x 2   x Seated figure 4 and supine cross body   Supported lumbar unloading in standing 1'  x Propped position on forearms on high table. Modified \"hang\" position simulating deep water hang in pool   Re-evaluation of all subjective & objective measures 30'   x 11/8 by primary PT for reassessment of all goals and HEP updated   Written HEP: [address] Access Code [email or web address]    Assessment:  Pt has been seen for 28 PT visits to date for her chronic L sided lumbar radiculopathy with now two recent hospital admissions for intractable pain. Resuming PT today after over a one month lapse in care secondary to a recent admission. Pt was previously making steady progress in aquatic therapy but unfortunately demonstrates a notable functional decline given her recent hospitalization. Pt arrived to visit ambulatory with a SPC but with a slow, cautious gait given L LE numbness and weakness. Worsening tolerances noted with lumbar mobility in all planes and continues to be very weak through her L LE. Significant time spent today discussing her recent hospitalization and symptoms.  Pt expresses strong motivation to resume aquatic therapy to restore progress previously made in therapy with pt still appropriate to proceed in this environment of care. Trialed light manual to L posterior hip and provided pt with an updated written home program which seemed to help sx slightly.      STG: (to be met in 18 treatments)  Pt will self report worst pain no greater than 3/10 in order to better tolerate ADLs/work activities with minimal dysfunction RECENT SETBACK/HOSPITALIZATION; GOAL EXTENDED THROUGH END OF UPDATED POC 11/8  Pt will improve lumbar AROM to 90% or greater in all planes in order to demonstrate ability to move/reach in all planes unrestricted at PLOF RECENT SETBACK/HOSPITALIZATION; GOAL EXTENDED THROUGH END OF UPDATED POC 11/8  Pt will improve trunk strength to 4/5 or greater in all planes to show improved stability at prior level RECENT SETBACK/HOSPITALIZATION; GOAL EXTENDED THROUGH END OF UPDATED POC 11/8  Pt will self report reduced peripheral L LE pain (no distal than L knee) to demonstrate initial  favorable centralization response to therapy RECENT SETBACK/HOSPITALIZATION; GOAL EXTENDED THROUGH END OF UPDATED POC 11/8  LTG: (to be met in 43 treatments)  Pt will demonstrate improved B LE strength to 4/5 or greater in order to demonstrate improved stability/strength necessary for unrestricted ADLs/work activities RECENT SETBACK/HOSPITALIZATION; GOAL EXTENDED THROUGH END OF UPDATED POC 11/8  Pt will self report ability to complete all ADLs with pain no greater than 1/10 to demonstrate unrestricted functional tolerances at prior level RECENT SETBACK/HOSPITALIZATION; GOAL EXTENDED THROUGH END OF UPDATED POC 11/8  Pt will improve core strength Sahrmann Grade 2/5 or greater to demonstrate better support and stability to lumbar spine RECENT SETBACK/HOSPITALIZATION; GOAL EXTENDED THROUGH END OF UPDATED POC 11/8  Pt will decrease SHEY to 10% impaired or less in order to demonstrate improved functional tolerances at PLOF with minimal restriction/dysfunction RECENT SETBACK/HOSPITALIZATION; GOAL EXTENDED THROUGH END OF UPDATED POC 11/8  Pt will demonstrate independence with a long term HEP for continued progress/maintenance after completion of PT GOAL PROGRESSING 11/8      Treatment to Date:  [x] Therapeutic Exercise   33572  [] Iontophoresis: 4 mg/mL Dexamethasone Sodium Phosphate  mAmin  43988   [] Therapeutic Activity  82237 [] Vasopneumatic cold with compression  85590    [] Gait Training   21860 [] Ultrasound   45668   [] Neuromuscular Re-education  56218 [] Electrical Stimulation Unattended  71699   [] Manual Therapy  90325 [] Electrical Stimulation Attended  33630   [x] Instruction in HEP  [] Lumbar/Cervical Traction  92022   [x] Aquatic Therapy   42175 [] Cold/hotpack    [] Massage   42884      [] Dry Needling, 1 or 2 muscles  85774   [] Biofeedback, first 15 minutes   47273  [] Biofeedback, additional 15 minutes   51199 [] Dry Needling, 3 or more muscles  08084      Patient Status:     [] Continue per initial plan of care. [x] Additional visits necessary. [x] Other: Continue aquatic PT as below       Requested Frequency/Duration: 2 times per week for 43 total visits on current POC to match current insurance authorization through 12/31/22        Treatment Charges: Minutes Units   []  Ultrasound     []  Electrical-Stim     []  Iontophoresis     []  Traction     []  Massage       []  Eval     []  Gait     [x]  Ther Exercise  33 2   [x]  Manual Therapy 10  1   []  Ther Activities       []  Aquatics     []  Vasopneumatic Device     []  Neuro Re-Ed       []  Other       Total Treatment Time: 43 3      Treatment Time: 2:03pm-2:43pm         Electronically signed by: Sandip Valles PT    If you have any questions or concerns, please don't hesitate to call. Thank you for your referral.    Physician Signature:________________________________Date:__________________  By signing above or cosigning this note, I have reviewed this plan of care and certify a need for medically necessary rehabilitation services.      *PLEASE SIGN ABOVE AND FAX BACK ALL PAGES*

## 2022-11-08 NOTE — PROGRESS NOTES
Post-Discharge Transitional Care  Follow Up      Kailash Gambino   YOB: 1960    Date of Office Visit:  11/8/2022  Date of Hospital Admission: 10/14/22  Date of Hospital Discharge: 10/25/22  Risk of hospital readmission (high >=14%. Medium >=10%) :Readmission Risk Score: 6.6      Care management risk score Rising risk (score 2-5) and Complex Care (Scores >=6): No Risk Score On File     Non face to face  following discharge, date last encounter closed (first attempt may have been earlier): 10/26/2022    Call initiated 2 business days of discharge: Yes    ASSESSMENT/PLAN:   Below is the assessment and plan developed based on review of pertinent history, physical exam, labs, studies, and medications. 1.Piriformis Syndrome L hip- patient is seeing pain management for Steroid shot  2. L Cubital Tunnel Syndrome- Surgery scheduled 11/23/2022  3. DM- A1c 7.7 continue with same treatment. Uptodate with eye/foot exam  4. HM- Cologard and Flu shot ordered. Medical Decision Making: moderate complexity  FU in 4 weeks          Subjective:   HPI:  Follow up of Hospital problems/diagnosis(es): Janel Rene 89  DM  Inpatient course: Discharge summary reviewed- see chart.     Interval history/Current status: baCK Herbert    Patient Active Problem List   Diagnosis    Cervical spondylosis    Type 2 diabetes mellitus without complication, without long-term current use of insulin (HCC)    Hypertension    Abnormal auditory perception    Nasal polyps    Osteoarthritis of left knee    Osteoarthritis of right knee    DIEGO (nonalcoholic steatohepatitis)    Vitamin D deficiency    Iron deficiency anemia    Dyslipidemia    Trochanteric bursitis    Chronic left shoulder pain    Restless legs syndrome    Generalized anxiety disorder    Toxic metabolic encephalopathy    Class 2 obesity in adult    Leg edema    Recurrent major depressive disorder (HCC)    Dermatitis    Retrolisthesis of vertebrae    Bilateral carpal tunnel syndrome    Intention tremor    Sciatica    Back pain with radiation    Left sciatic nerve pain    Lumbar disc disease with radiculopathy    Intractable back pain    Piriformis syndrome of left side       Medications listed as ordered at the time of discharge from hospital     Medication List            Accurate as of November 8, 2022  2:19 PM. If you have any questions, ask your nurse or doctor. CHANGE how you take these medications      alendronate 70 MG tablet  Commonly known as: FOSAMAX  Take 1 tablet by mouth every 7 days  What changed: additional instructions     * Trulicity 1.5 DF/1.1UB SC injection  Generic drug: dulaglutide  What changed: Another medication with the same name was changed. Make sure you understand how and when to take each. * Trulicity 1.5 OD/3.3WF SC injection  Generic drug: dulaglutide  INJECT THE CONTENTS OF ONE SYRINGE UNDER THE SKIN ONCE WEEKLY  What changed: See the new instructions. * This list has 2 medication(s) that are the same as other medications prescribed for you. Read the directions carefully, and ask your doctor or other care provider to review them with you.                 CONTINUE taking these medications      * albuterol sulfate  (90 Base) MCG/ACT inhaler  Commonly known as: PROVENTIL;VENTOLIN;PROAIR     * albuterol sulfate  (90 Base) MCG/ACT inhaler  Commonly known as: Proventil HFA  Inhale 2 puffs into the lungs every 4 hours as needed for Wheezing     Aspirin Low Dose 81 MG EC tablet  Generic drug: aspirin  TAKE 1 TABLET BY MOUTH ONE TIME A DAY     * atorvastatin 40 MG tablet  Commonly known as: LIPITOR     * atorvastatin 40 MG tablet  Commonly known as: LIPITOR  Take 1 tablet by mouth at bedtime     * bimatoprost 0.01 % Soln ophthalmic drops  Commonly known as: LUMIGAN     * bimatoprost 0.01 % Soln ophthalmic drops  Commonly known as: LUMIGAN     * busPIRone 10 MG tablet  Commonly known as: BUSPAR     * busPIRone 10 MG tablet  Commonly known as: BUSPAR  TAKE 1 TABLET BY MOUTH 3 TIMES A DAY     * celecoxib 100 MG capsule  Commonly known as: CELEBREX     * celecoxib 100 MG capsule  Commonly known as: CELEBREX  TAKE 1 CAPSULE BY MOUTH 2 TIMES A DAY     cyclobenzaprine 10 MG tablet  Commonly known as: FLEXERIL     diazePAM 2 MG tablet  Commonly known as: VALIUM  Take 1 tablet by mouth every 6 hours as needed for Anxiety for up to 20 doses. diclofenac sodium 1 % Gel  Commonly known as: VOLTAREN     docusate sodium 100 MG capsule  Commonly known as: COLACE     Elbow Brace Misc  1 each by Does not apply route at bedtime Please dispense left cubital tunnel brace     * furosemide 20 MG tablet  Commonly known as: LASIX     * furosemide 20 MG tablet  Commonly known as: Lasix  Take 0.5 tablets by mouth every other day     * gabapentin 300 MG capsule  Commonly known as: NEURONTIN     * gabapentin 300 MG capsule  Commonly known as: NEURONTIN  Take 1 capsule by mouth 3 times daily for 30 days. * glipiZIDE 10 MG tablet  Commonly known as: GLUCOTROL     * glipiZIDE 10 MG tablet  Commonly known as: GLUCOTROL  Take 1 tab bid     * lisinopril 40 MG tablet  Commonly known as: PRINIVIL;ZESTRIL     * lisinopril 40 MG tablet  Commonly known as: PRINIVIL;ZESTRIL  Take 1 tablet by mouth daily     * magnesium oxide 400 MG tablet  Commonly known as: MAG-OX     * Magnesium Oxide 400 MG Caps  Take 400 mg by mouth daily     * omeprazole 20 MG delayed release capsule  Commonly known as: PRILOSEC     * omeprazole 20 MG delayed release capsule  Commonly known as: PriLOSEC  Take 1 capsule by mouth 2 times daily     * oxyCODONE 5 MG immediate release tablet  Commonly known as: ROXICODONE     * oxyCODONE 5 MG immediate release tablet  Commonly known as: ROXICODONE  Take 2 tablets by mouth every 4 hours as needed for Pain for up to 30 days.      oxyCODONE-acetaminophen 5-325 MG per tablet  Commonly known as: PERCOCET     * Prodigy Autocode Blood Glucose w/Device Kit  1 Device by Does not apply route 3 times daily     * blood glucose monitor kit and supplies  Dispense sufficient amount for indicated testing frequency plus additional to accommodate PRN testing needs. Dispense all needed supplies to include: monitor, strips, lancing device, lancets, control solutions, alcohol swabs. Prodigy Brand     Prodigy Lancets 28G Misc  1 Bottle by Does not apply route three times daily     Prodigy No Coding Blood Gluc strip  Generic drug: blood glucose test strips  Use to test once daily and as needed as directed by provider. Please dispense Prodigy brand. * rOPINIRole 2 MG tablet  Commonly known as: REQUIP     * rOPINIRole 2 MG tablet  Commonly known as: REQUIP     * sertraline 50 MG tablet  Commonly known as: ZOLOFT     * sertraline 50 MG tablet  Commonly known as: ZOLOFT  TAKE ONE TABLET BY MOUTH EVERY MORNING     * Synjardy 5-1000 MG Tabs  Generic drug: Empagliflozin-metFORMIN HCl     * Synjardy 5-1000 MG Tabs  Generic drug: Empagliflozin-metFORMIN HCl  TAKE 1 TABLET BY MOUTH 2 TIMES A DAY           * This list has 32 medication(s) that are the same as other medications prescribed for you. Read the directions carefully, and ask your doctor or other care provider to review them with you. STOP taking these medications      naproxen 500 MG tablet  Commonly known as: Naprosyn  Stopped by: Ulysses Rave, MD                Medications marked \"taking\" at this time  Outpatient Medications Marked as Taking for the 11/8/22 encounter (Office Visit) with Ulysses Rave, MD   Medication Sig Dispense Refill    ASPIRIN LOW DOSE 81 MG EC tablet TAKE 1 TABLET BY MOUTH ONE TIME A DAY 30 tablet 2    diazePAM (VALIUM) 2 MG tablet Take 1 tablet by mouth every 6 hours as needed for Anxiety for up to 20 doses. 20 tablet 0    oxyCODONE (ROXICODONE) 5 MG immediate release tablet Take 2 tablets by mouth every 4 hours as needed for Pain for up to 30 days.  60 tablet 0 busPIRone (BUSPAR) 10 MG tablet Take 10 mg by mouth in the morning and 10 mg in the evening. celecoxib (CELEBREX) 100 MG capsule Take 100 mg by mouth 2 times daily      gabapentin (NEURONTIN) 300 MG capsule Take 300 mg by mouth 2 times daily. glipiZIDE (GLUCOTROL) 10 MG tablet Take 10 mg by mouth 2 times daily (before meals)      furosemide (LASIX) 20 MG tablet Take 20 mg by mouth daily      lisinopril (PRINIVIL;ZESTRIL) 40 MG tablet Take 40 mg by mouth daily      bimatoprost (LUMIGAN) 0.01 % SOLN ophthalmic drops Place 1 drop into both eyes nightly      omeprazole (PRILOSEC) 20 MG delayed release capsule Take 20 mg by mouth daily      rOPINIRole (REQUIP) 2 MG tablet Take 2 mg by mouth nightly      atorvastatin (LIPITOR) 40 MG tablet Take 40 mg by mouth nightly      magnesium oxide (MAG-OX) 400 MG tablet Take 400 mg by mouth daily      sertraline (ZOLOFT) 50 MG tablet Take 50 mg by mouth daily      Empagliflozin-metFORMIN HCl (SYNJARDY) 5-1000 MG TABS Take 1,000 mg by mouth 2 times daily (before meals)      Dulaglutide (TRULICITY) 1.5 RB/0.0PA SOPN Inject 1.5 mg into the skin once a week      albuterol sulfate HFA (PROVENTIL;VENTOLIN;PROAIR) 108 (90 Base) MCG/ACT inhaler Inhale 2 puffs into the lungs every 6 hours as needed for Wheezing      diclofenac sodium (VOLTAREN) 1 % GEL Apply 2 g topically as needed for Pain      docusate sodium (COLACE) 100 MG capsule Take 100 mg by mouth 2 times daily as needed for Constipation      cyclobenzaprine (FLEXERIL) 10 MG tablet Take 10 mg by mouth 3 times daily as needed for Muscle spasms      oxyCODONE (ROXICODONE) 5 MG immediate release tablet Take 5 mg by mouth every 4 hours as needed for Pain.       rOPINIRole (REQUIP) 2 MG tablet       sertraline (ZOLOFT) 50 MG tablet TAKE ONE TABLET BY MOUTH EVERY MORNING 30 tablet 0    TRULICITY 1.5 WC/4.8SE SOPN INJECT THE CONTENTS OF ONE SYRINGE UNDER THE SKIN ONCE WEEKLY (Patient taking differently: Inject 1.5 mg into the skin once a week Saturdays) 6 mL 2    celecoxib (CELEBREX) 100 MG capsule TAKE 1 CAPSULE BY MOUTH 2 TIMES A  capsule 1    SYNJARDY 5-1000 MG TABS TAKE 1 TABLET BY MOUTH 2 TIMES A  tablet 2    lisinopril (PRINIVIL;ZESTRIL) 40 MG tablet Take 1 tablet by mouth daily 30 tablet 2    busPIRone (BUSPAR) 10 MG tablet TAKE 1 TABLET BY MOUTH 3 TIMES A  tablet 0    Elastic Bandages & Supports (ELBOW BRACE) MISC 1 each by Does not apply route at bedtime Please dispense left cubital tunnel brace 1 each 0    alendronate (FOSAMAX) 70 MG tablet Take 1 tablet by mouth every 7 days (Patient taking differently: Take 70 mg by mouth every 7 days Wednesdays) 12 tablet 1    oxyCODONE-acetaminophen (PERCOCET) 5-325 MG per tablet Take by mouth every 4 hours as needed for Pain. atorvastatin (LIPITOR) 40 MG tablet Take 1 tablet by mouth at bedtime 90 tablet 2    furosemide (LASIX) 20 MG tablet Take 0.5 tablets by mouth every other day 60 tablet 3    blood glucose test strips (PRODIGY NO CODING BLOOD GLUC) strip Use to test once daily and as needed as directed by provider. Please dispense Prodigy brand. 100 each 3    blood glucose monitor kit and supplies Dispense sufficient amount for indicated testing frequency plus additional to accommodate PRN testing needs. Dispense all needed supplies to include: monitor, strips, lancing device, lancets, control solutions, alcohol swabs.    Prodigy Brand 1 kit 0    Magnesium Oxide 400 MG CAPS Take 400 mg by mouth daily 30 capsule 3    glipiZIDE (GLUCOTROL) 10 MG tablet Take 1 tab bid 180 tablet 1    omeprazole (PRILOSEC) 20 MG delayed release capsule Take 1 capsule by mouth 2 times daily 180 capsule 3    bimatoprost (LUMIGAN) 0.01 % SOLN ophthalmic drops Place 1 drop into both eyes nightly      [employer] LANCETS 28G MISC 1 Bottle by Does not apply route three times daily 100 each 3    Blood Glucose Monitoring Suppl (PRODIGY AUTOCODE BLOOD GLUCOSE) w/Device KIT 1 Device by Does not apply route 3 times daily 1 kit 0        Review of Systems   Constitutional:  Positive for activity change. Negative for appetite change. Using cane for ambulation. Eyes:  Negative for visual disturbance. Respiratory:  Negative for shortness of breath. Cardiovascular:  Negative for chest pain and leg swelling. Gastrointestinal:  Negative for diarrhea. Genitourinary:  Negative for difficulty urinating. Musculoskeletal:  Positive for gait problem. Psychiatric/Behavioral:  Negative for dysphoric mood. The patient is not nervous/anxious. Objective:      Vitals:    11/08/22 1053   BP: 132/84   Pulse: 75   SpO2: 96%       Physical Exam  Vitals and nursing note reviewed. Constitutional:       Appearance: Normal appearance. Cardiovascular:      Rate and Rhythm: Normal rate and regular rhythm. Heart sounds: No murmur heard. Pulmonary:      Effort: Pulmonary effort is normal.      Breath sounds: Normal breath sounds. Abdominal:      Palpations: Abdomen is soft. Tenderness: There is no abdominal tenderness. Musculoskeletal:      Right lower leg: No edema. Left lower leg: No edema. Lymphadenopathy:      Cervical: No cervical adenopathy. Neurological:      Mental Status: She is alert. An electronic signature was used to authenticate this note.   --Manny Ibarra MD

## 2022-11-08 NOTE — PROGRESS NOTES
DIABETES and HYPERTENSION visit    BP Readings from Last 3 Encounters:   10/25/22 125/78   10/09/22 (!) 146/81   09/22/22 127/86        Hemoglobin A1C (%)   Date Value   08/25/2022 6.9   05/26/2022 7.9   09/14/2021 7.7     Microalb/Crt. Ratio (mcg/mg creat)   Date Value   09/22/2022 Can not be calculated     LDL Cholesterol (mg/dL)   Date Value   06/24/2021 52     HDL (mg/dL)   Date Value   06/24/2021 59     BUN (mg/dL)   Date Value   10/19/2022 33 (H)     Creatinine (mg/dL)   Date Value   10/19/2022 1.08 (H)     Glucose (mg/dL)   Date Value   10/19/2022 159 (H)   05/19/2012 137 (H)            Have you changed or started any medications since your last visit including any over-the-counter medicines, vitamins, or herbal medicines? no   Have you stopped taking any of your medications? Is so, why? -  no  Are you having any side effects from any of your medications? - no    Have you seen any other physician or provider since your last visit?  no   Have you had any other diagnostic tests since your last visit? yes - EKG, MRI , XRAYS,LABS,FL   Have you been seen in the emergency room and/or had an admission in a hospital since we last saw you?  yes - [facility]   Have you had your routine dental cleaning in the past 6 months?  no     Have you had your annual diabetic retinal (eye) exam? Yes   (ensure copy of exam is in the chart)    Do you have an active [employer] account? If no, what is the barrier?   Yes    Patient Care Team:  Sanchez Yan MD as PCP - General (Family Medicine)  Sanchez Yan MD as PCP - [facility] Provider  Dinora Grajeda MD as Consulting Physician (Gastroenterology)  Sara Keane RN as [address] [place] [address] History Review  Past Medical, Family, and Social History reviewed and does not contribute to the patient presenting condition    Health Maintenance   Topic Date Due    Shingles vaccine (1 of 2) Never done    Diabetic retinal exam  11/01/2017    Diabetic foot exam 12/01/2017    COVID-19 Vaccine (3 - Booster for Moderna series) 04/08/2021    Lipids  06/24/2022    Flu vaccine (1) 08/01/2022    Colorectal Cancer Screen  09/20/2022    Breast cancer screen  11/14/2022    Depression Monitoring  05/26/2023    A1C test (Diabetic or Prediabetic)  08/25/2023    Diabetic microalbuminuria test  09/22/2023    DTaP/Tdap/Td vaccine (3 - Td or Tdap) 08/25/2032    Pneumococcal 0-64 years Vaccine  Completed    Hepatitis C screen  Completed    HIV screen  Completed    Hepatitis A vaccine  Aged Out    Hib vaccine  Aged Out    Meningococcal (ACWY) vaccine  Aged Out

## 2022-11-08 NOTE — TELEPHONE ENCOUNTER
E-scribe request for med refill. Please review and e-scribe if applicable. Last Visit Date:  08/25/2022  Next Visit Date:  11/8/2022    Hemoglobin A1C (%)   Date Value   08/25/2022 6.9   05/26/2022 7.9   09/14/2021 7.7             ( goal A1C is < 7)   Microalb/Crt.  Ratio (mcg/mg creat)   Date Value   09/22/2022 Can not be calculated     LDL Cholesterol (mg/dL)   Date Value   06/24/2021 52       (goal LDL is <100)   AST (U/L)   Date Value   10/04/2022 35 (H)     ALT (U/L)   Date Value   10/04/2022 58 (H)     BUN (mg/dL)   Date Value   10/19/2022 33 (H)     BP Readings from Last 3 Encounters:   10/25/22 125/78   10/09/22 (!) 146/81   09/22/22 127/86          (goal 120/80)        Patient Active Problem List:     Cervical spondylosis     Type 2 diabetes mellitus without complication, without long-term current use of insulin (HCC)     Hypertension     Abnormal auditory perception     Nasal polyps     Osteoarthritis of left knee     Osteoarthritis of right knee     DIEGO (nonalcoholic steatohepatitis)     Vitamin D deficiency     Iron deficiency anemia     Dyslipidemia     Trochanteric bursitis     Chronic left shoulder pain     Restless legs syndrome     Generalized anxiety disorder     Toxic metabolic encephalopathy     Class 2 obesity in adult     Leg edema     Recurrent major depressive disorder (HCC)     Dermatitis     Retrolisthesis of vertebrae     Bilateral carpal tunnel syndrome     Intention tremor     Sciatica     Back pain with radiation     Left sciatic nerve pain     Lumbar disc disease with radiculopathy     Intractable back pain     Piriformis syndrome of left side      ----Scar Martinez

## 2022-11-09 ENCOUNTER — OFFICE VISIT (OUTPATIENT)
Dept: NEUROLOGY | Age: 62
End: 2022-11-09
Payer: COMMERCIAL

## 2022-11-09 VITALS
DIASTOLIC BLOOD PRESSURE: 71 MMHG | TEMPERATURE: 97.1 F | OXYGEN SATURATION: 98 % | RESPIRATION RATE: 22 BRPM | WEIGHT: 180 LBS | HEART RATE: 84 BPM | BODY MASS INDEX: 31.89 KG/M2 | SYSTOLIC BLOOD PRESSURE: 102 MMHG

## 2022-11-09 DIAGNOSIS — M54.16 LUMBAR RADICULOPATHY: Primary | ICD-10-CM

## 2022-11-09 PROCEDURE — 3078F DIAST BP <80 MM HG: CPT | Performed by: STUDENT IN AN ORGANIZED HEALTH CARE EDUCATION/TRAINING PROGRAM

## 2022-11-09 PROCEDURE — 3074F SYST BP LT 130 MM HG: CPT | Performed by: STUDENT IN AN ORGANIZED HEALTH CARE EDUCATION/TRAINING PROGRAM

## 2022-11-09 PROCEDURE — 99214 OFFICE O/P EST MOD 30 MIN: CPT | Performed by: STUDENT IN AN ORGANIZED HEALTH CARE EDUCATION/TRAINING PROGRAM

## 2022-11-09 RX ORDER — GABAPENTIN 400 MG/1
400 CAPSULE ORAL 3 TIMES DAILY
Qty: 30 CAPSULE | Refills: 2 | Status: SHIPPED | OUTPATIENT
Start: 2022-11-09 | End: 2022-11-09

## 2022-11-09 RX ORDER — GABAPENTIN 400 MG/1
400 CAPSULE ORAL 3 TIMES DAILY
Qty: 90 CAPSULE | Refills: 0 | Status: SHIPPED | OUTPATIENT
Start: 2022-11-09 | End: 2022-12-09

## 2022-11-09 RX ORDER — GABAPENTIN 300 MG/1
300 CAPSULE ORAL 3 TIMES DAILY
Qty: 90 CAPSULE | Refills: 1 | Status: CANCELLED | OUTPATIENT
Start: 2022-11-09 | End: 2023-01-08

## 2022-11-09 NOTE — PROGRESS NOTES
[address] [place] [address]  [id number] [id number]  Dept: [phone]  Dept Fax: [phone]    NEUROLOGY CLINIC NOTE       PATIENT NAME: Lawson Tinajero  PATIENT MRN: [id number]  PRIMARY CARE PHYSICIAN: Sidney Cool MD    HPI:      Lawson Tinajero is a 65years old female patient. She follows up with neurology as a case of left hip and low back pain, was referred by family medicine in 4/6/22 which was her first visit to our neurology service. She had a recent hospital encounter in October 16 2022; she presented with constant left buttock pain for pain control and evaluation. She had MRI pelvis WWO; slight asymmetric atrophy of the left-sided piriformis muscle which can be seen with left-sided piriformis syndrome. Mild to moderate nonspecific edema in the right gluteus julissa muscle with no significant muscle atrophy. Mild nonspecific edema in the posterior left obturator internus muscle and mild nonspecific edema in the subcutaneous fat at the lateral aspect of the right hip/thigh; she was treated with steroids epidural injection IR guided in addition to percocet and analgesics. She has left cubital tunnel syndrome with surgery planned in 11/23/2022 with Dr. Virginia Lester and Dr. Dorothy Pulliam is going to have an injection in the piriformis area (pain management). She said she ran out of he gabapentin since September and she is still in pain. For her tremors; 1 year ago noticed, started in right hand; progressively worse now left hand in involved. It occurs at rest, no family hx, worse in the evening, worse with action. Scared to carry a cup of coffee. no relation with anxiety. Patient doesn't drink alcohol     Of note: she complained Word finding difficulty and memory issues and brain fog sensation ever being diagnosed with covid. Stable symptoms     Interval history:     65 female patient.  She was first seen and evaluated in our neurology service in 4/6/22; she was tearing in pain with low back, left hip and leg pain. Initial onset of pain was 1 year ago but has been acutely worsening over the past week prior to this visit. Pain is was 9 out of 10 and is affecting ADLs; affecting patient's ability to walk or sleep. Had Toradol injection at the family medicine office which partially helped with her pain. She now describes a low back pain that is more on the left side radiating down to the posterior aspect of the thigh down to the knee then travels laterally over the tibia down to the big toe associated with numbness. Constant. Partial relief with leaning forward and Toradol . Sharp and stabbing in nature, increased by lying on the left side. Now she's using a cane for ambulation. She tried over-the-counter medications and steroids in the past with no relief. Mild improvement seen in past with aquatic therapy and Flexeril. She was started on gabapentin by orthopedics one week before the neurology office visit; 300 mg 3 times daily with no improvement seen as of yet back at that time. She had epidural lumbar injection back in November 2021 and worked with physical therapy but reported partial relief of her symptoms    No red flags; no weight loss, no fever, no loss of appetite, no bladder or bowel incontinence      Recent imaging of the MRI C/T/L spine reviewed and were remarkable for mild degenerative changes with spinal stenosis of L3-L4, ligamentum flavum hypertrophy with face joint hypertrophy and paraspinal multifidus muscle atrophy. The patient had a follow up MRI L spine in 6/5/22 which showed no significant change. There was mentioning disc bulging at L5-S1 1 with asymmetric engorgement on the left lateral recess and abutment of the transversing the left S1 nerve root.   MRI C-spine and T-spine showed multilevel degenerative change with moderate spinal canal narrowing at C5-6 and mild spinal canal narrowing at C4-5 and C6-7 to level neuroforaminal narrowing. Also degenerative change with mild spinal canal narrowing at T8-9 and T9-10 with bilateral neuroforaminal narrowing at T8-9. After the last visit 4/6/22, the patient had some relief in her back pain. However in May 31st/2022 it appears she had an exacerbation of her low back pain following just simply folding some clothes. She went to [facility] and was hospitalized for severe low back pain that was treated with opioids. She was hospitalized for 10 days after she had hypotension and unresponsivenes and was transferred to the ICU. She was discharged awake and alert and oriented but her back pain persisted. She is to start physical therapy next week. she reported bilateral left ulnar side of each wist numbness that extends all the way back to the elbow. She mentioned she was lying on her abdomen in the ER for prolonged time on the examination bed. Low back pain still there, constant all day. She was discharged on some percocet with gabapentin that was given as 300 mg in the am and 600 mg in the pm. No new red flags, no incontiencce or fever.        PREVIOUS WORKUP:     Lab Results   Component Value Date    WBC 13.3 (H) 10/14/2022    HGB 13.5 10/14/2022    HCT 42.1 10/14/2022    MCV 85.7 10/14/2022     10/14/2022       Past Medical History:   Diagnosis Date    Cervical spondylosis 04/2009    c-4 c-5, c-5 c-6, c-6 c-7    CKD (chronic kidney disease)     per pt, does not have nephrologist    COVID-19 12/06/2021    nasal congestion, fatique and myalgia x 3 weeks    Generalized anxiety disorder 12/08/2020    GERD (gastroesophageal reflux disease)     History of recent hospitalization 09/30/2022    til 10/9/22 at [facility]. V's    History of swelling of feet     lasix QOD for this    Hyperlipidemia     Hypertension     Left hand weakness     and pain, can not make fist d/t cubital tunnel issue    Lumbar radiculopathy 11/10/2021    was following with pain management and has had epidural injections DIEGO (nonalcoholic steatohepatitis) 10/12/2014    Obesity     Osteoarthritis of left knee 2014    Recurrent major depressive disorder ([facility] [address] 07/15/2022    Restless legs syndrome     Snores 2022    was seen by Dr. Claudia Charlton for likely sleep apnea while hospitalized , to follow up as OP for sleep study, has not been done    TIA (transient ischemic attack)     no residual symptoms    Type II or unspecified type diabetes mellitus without mention of complication, not stated as uncontrolled     Wears glasses     Wellness examination     PCP, Dr. Ave Luis, last seen        Past Surgical History:   Procedure Laterality Date     SECTION      x2    COLONOSCOPY  2012    ? polyps per pt    HYSTERECTOMY, TOTAL ABDOMINAL (CERVIX REMOVED)      with BSO    SHOULDER SURGERY Left 2021    SHOULDER MANIPULATION WITH PRE OP INTERSCALENE BLOCK and intraop injection performed by Deyanira Mccoy DO at [facility] Left 10/03/2022    S1 nerve root injection, Dr. Laura Boogie Left 10/06/2022    S1 NERVE ROOT INJECTION performed by Roslyn Wayne MD at [address] [place] [address],[address]      as child    UPPER GASTROINTESTINAL ENDOSCOPY  2012    [facility]        Social History     Socioeconomic History    Marital status:      Spouse name: Not on file    Number of children: Not on file    Years of education: Not on file    Highest education level: Not on file   Occupational History    Not on file   Tobacco Use    Smoking status: Never    Smokeless tobacco: Never   Vaping Use    Vaping Use: Never used   Substance and Sexual Activity    Alcohol use:  Yes     Alcohol/week: 0.0 standard drinks     Comment: rarely/ 4 times a year    Drug use: Never    Sexual activity: Yes   Other Topics Concern    Not on file   Social History Narrative    Not on file     Social Determinants of Health     Financial Resource Strain: Low Risk     Difficulty of Paying Living Expenses: Not hard at all   Food Insecurity: No Food Insecurity    Worried About [facility] in the Last Year: Never true    Ran Out of Food in the Last Year: Never true   Transportation Needs: No Transportation Needs    Lack of Transportation (Medical): No    Lack of Transportation (Non-Medical): No   Physical Activity: Unknown    Days of Exercise per Week: 0 days    Minutes of Exercise per Session: Not on file   Stress: Stress Concern Present    Feeling of Stress : To some extent   Social Connections: Socially Integrated    Frequency of Communication with Friends and Family: More than three times a week    Frequency of Social Gatherings with Friends and Family: Once a week    Attends Sabianist Services: More than 4 times per year    Active Member of [address] [place] [address] or Organizations: Yes    Attends Club or Organization Meetings: More than 4 times per year    Marital Status:    Intimate Partner Violence: Not At Risk    Fear of Current or Ex-Partner: No    Emotionally Abused: No    Physically Abused: No    Sexually Abused: No   Housing Stability: Low Risk     Unable to Pay for Housing in the Last Year: No    Number of [place] in the Last Year: 1    Unstable Housing in the Last Year: No        Current Outpatient Medications   Medication Sig Dispense Refill    ASPIRIN LOW DOSE 81 MG EC tablet TAKE 1 TABLET BY MOUTH ONE TIME A DAY 30 tablet 2    diazePAM (VALIUM) 2 MG tablet Take 1 tablet by mouth every 6 hours as needed for Anxiety for up to 20 doses. 20 tablet 0    oxyCODONE (ROXICODONE) 5 MG immediate release tablet Take 2 tablets by mouth every 4 hours as needed for Pain for up to 30 days. 60 tablet 0    busPIRone (BUSPAR) 10 MG tablet Take 10 mg by mouth in the morning and 10 mg in the evening. celecoxib (CELEBREX) 100 MG capsule Take 100 mg by mouth 2 times daily      gabapentin (NEURONTIN) 300 MG capsule Take 300 mg by mouth 2 times daily.       glipiZIDE (GLUCOTROL) 10 MG tablet Take 10 mg by mouth 2 times daily (before meals)      furosemide (LASIX) 20 MG tablet Take 20 mg by mouth daily      lisinopril (PRINIVIL;ZESTRIL) 40 MG tablet Take 40 mg by mouth daily      bimatoprost (LUMIGAN) 0.01 % SOLN ophthalmic drops Place 1 drop into both eyes nightly      omeprazole (PRILOSEC) 20 MG delayed release capsule Take 20 mg by mouth daily      rOPINIRole (REQUIP) 2 MG tablet Take 2 mg by mouth nightly      atorvastatin (LIPITOR) 40 MG tablet Take 40 mg by mouth nightly      magnesium oxide (MAG-OX) 400 MG tablet Take 400 mg by mouth daily      sertraline (ZOLOFT) 50 MG tablet Take 50 mg by mouth daily      Empagliflozin-metFORMIN HCl (SYNJARDY) 5-1000 MG TABS Take 1,000 mg by mouth 2 times daily (before meals)      Dulaglutide (TRULICITY) 1.5 IH/1.3HK SOPN Inject 1.5 mg into the skin once a week      albuterol sulfate HFA (PROVENTIL;VENTOLIN;PROAIR) 108 (90 Base) MCG/ACT inhaler Inhale 2 puffs into the lungs every 6 hours as needed for Wheezing      diclofenac sodium (VOLTAREN) 1 % GEL Apply 2 g topically as needed for Pain      docusate sodium (COLACE) 100 MG capsule Take 100 mg by mouth 2 times daily as needed for Constipation      cyclobenzaprine (FLEXERIL) 10 MG tablet Take 10 mg by mouth 3 times daily as needed for Muscle spasms      oxyCODONE (ROXICODONE) 5 MG immediate release tablet Take 5 mg by mouth every 4 hours as needed for Pain.       rOPINIRole (REQUIP) 2 MG tablet       sertraline (ZOLOFT) 50 MG tablet TAKE ONE TABLET BY MOUTH EVERY MORNING 30 tablet 0    TRULICITY 1.5 SW/4.2ZO SOPN INJECT THE CONTENTS OF ONE SYRINGE UNDER THE SKIN ONCE WEEKLY (Patient taking differently: Inject 1.5 mg into the skin once a week Saturdays) 6 mL 2    celecoxib (CELEBREX) 100 MG capsule TAKE 1 CAPSULE BY MOUTH 2 TIMES A  capsule 1    SYNJARDY 5-1000 MG TABS TAKE 1 TABLET BY MOUTH 2 TIMES A  tablet 2    lisinopril (PRINIVIL;ZESTRIL) 40 MG tablet Take 1 tablet by mouth daily 30 tablet 2    busPIRone (BUSPAR) 10 MG tablet TAKE 1 TABLET BY MOUTH 3 TIMES A  tablet 0    Elastic Bandages & Supports (ELBOW BRACE) MISC 1 each by Does not apply route at bedtime Please dispense left cubital tunnel brace 1 each 0    alendronate (FOSAMAX) 70 MG tablet Take 1 tablet by mouth every 7 days (Patient taking differently: Take 70 mg by mouth every 7 days Wednesdays) 12 tablet 1    oxyCODONE-acetaminophen (PERCOCET) 5-325 MG per tablet Take by mouth every 4 hours as needed for Pain. atorvastatin (LIPITOR) 40 MG tablet Take 1 tablet by mouth at bedtime 90 tablet 2    furosemide (LASIX) 20 MG tablet Take 0.5 tablets by mouth every other day 60 tablet 3    blood glucose test strips (PRODIGY NO CODING BLOOD GLUC) strip Use to test once daily and as needed as directed by provider. Please dispense Prodigy brand. 100 each 3    blood glucose monitor kit and supplies Dispense sufficient amount for indicated testing frequency plus additional to accommodate PRN testing needs. Dispense all needed supplies to include: monitor, strips, lancing device, lancets, control solutions, alcohol swabs. Prodigy Brand 1 kit 0    Magnesium Oxide 400 MG CAPS Take 400 mg by mouth daily 30 capsule 3    gabapentin (NEURONTIN) 300 MG capsule Take 1 capsule by mouth 3 times daily for 30 days.  90 capsule 0    glipiZIDE (GLUCOTROL) 10 MG tablet Take 1 tab bid 180 tablet 1    omeprazole (PRILOSEC) 20 MG delayed release capsule Take 1 capsule by mouth 2 times daily 180 capsule 3    albuterol sulfate HFA (PROVENTIL HFA) 108 (90 Base) MCG/ACT inhaler Inhale 2 puffs into the lungs every 4 hours as needed for Wheezing 2 Inhaler 5    bimatoprost (LUMIGAN) 0.01 % SOLN ophthalmic drops Place 1 drop into both eyes nightly      [employer] LANCETS 28G MISC 1 Bottle by Does not apply route three times daily 100 each 3    Blood Glucose Monitoring Suppl (PRODIGY AUTOCODE BLOOD GLUCOSE) w/Device KIT 1 Device by Does not apply route 3 times daily 1 kit 0     No current facility-administered medications for this visit. Allergies   Allergen Reactions    Codeine Nausea Only        REVIEW OF SYSTEMS:     Review of Systems     Review of Systems    Constitutional: Negative for appetite change, chills, fever and unexpected weight change. Eyes: Negative for photophobia and pain. Negative for visual disturbance. Respiratory: Negative for cough and shortness of breath. Cardiovascular: Negative  for chest pain. Negative for palpitations. Gastrointestinal: Negative for abdominal pain, constipation, diarrhea, nausea and vomiting. Endocrine: Negative for polydipsia and polyuria. Genitourinary: Negative for difficulty urinating, dysuria and hematuria. Musculoskeletal: Negative for back pain and neck pain. Skin: Negative for pallor and rash. Neurological: Negative for facial asymmetry, weakness, numbness and headaches. Negative for dizziness, tremors, seizures, syncope, speech difficulty and light-headedness. Psychiatric/Behavioral: Negative for behavioral problems and hallucinations. VITALS  There were no vitals taken for this visit. PHYSICAL EXAMINATION:     Physical Exam   General appearance: cooperative  Skin: no rash or skin lesions. HEENT: normocephalic  Optic Fundi: deferred  Neck: supple: no cervcical adenopathy or carotid bruit  Lungs: clear to auscultation  Heart: Regular rate and rhythm, normal S1, S2. No murmurs, clicks or gallops.   Peripheral pulses: radial pulses palpable  Abdominal: BS present, soft, NT, ND  Extremities: no edema    NEUROLOGICAL EXAMINATION:     GENERAL  Appears comfortable and in no distress   HEENT  NC/ AT   HEART  S1 and S2 heard; palpation of pulses: radial pulse    NECK  Supple and no bruits heard   MENTAL STATUS:  Alert, oriented, intact memory, no confusion, normal speech, normal language, no hallucination or delusion   CRANIAL NERVES: II     -      Visual fields intact to confrontation  III,IV,VI -  PERR, EOMs full, no ptosis  V -     Normal facial sensation   VII    -     Normal facial symmetry  VIII   -     Intact hearing   IX,X -     Symmetrical palate  XI    -     Symmetrical shoulder shrug  XII   -     Midline tongue, no atrophy    MOTOR FUNCTION: RUE: Significant for good strength of grade 5/5 in proximal and 4/5 in distal muscles   LUE: Significant for good strength of grade 5/5 in proximal and 4/5 in distal muscles   RLE: Significant for good strength of grade 5/5 in proximal and distal muscle groups   LLE: Significant for good strength of grade 5/5 in proximal and distal muscle groups      Normal bulk, normal tone and no involuntary movements, no tremor   SENSORY FUNCTION:  reduced sensation over the left radial nerve distrubution   CEREBELLAR FUNCTION:  Intact fine motor control over upper limbs and lower limbs   REFLEX FUNCTION:  Symmetric in upper and lower extremities, no Babinski sign   STATION and GAIT  Normal gait and tandem station, normal tip toes and heel walking     Positive paraspinal tenderness on left side    IMAGING:     Imaging/Diagnostics:  ECHO Complete 2D W Doppler W Color    Result Date: 6/6/2022  [facility] Transthoracic Echocardiography Report (TTE)  Patient Name Rehana Damon Date of Study                 06/06/2022               LEE ANN A   Date of      1960  Gender                        Female  Birth   Age          65 year(s)  Race                             Room Number  2011   Corporate ID [id number]    Weight:                       216 pounds, 98 kg  #   Patient Dread [de-identified]  #   MR #         [id number]      Sonographer                   Marisol Jorge   Accession #  [id number]  Interpreting Physician        Tawny Zelaya   Fellow                   Referring Nurse Practitioner   Interpreting             Referring Physician           Venu Lorenzo  Fellow                                                 Anastacia Huitron  Type of Study   TTE procedure:2D Echocardiogram, M-Mode, Doppler, Color Doppler. Procedure Date Date: 06/06/2022 Start: 02:10 PM Study Location: [address] [place] [address] Technical Quality: Fair visualization Indications:Congestive heart failure. Patient Status: Inpatient Weight: 216 pounds Rhythm: Sinus tachycardia HR: 111 bpm BP: 111/61 mmHg CONCLUSIONS Summary Technically difficult study. Normal LV size. Hyperdynamic left ventricular function. Estimated LV EF 65-70 %. Normal wall thickness. No segmental wall motion abnormalities. Grade I (mild) left ventricular diastolic dysfunction. Normal right ventricular size and function. Left atrium is normal in size. Right atrium was not well visualized. Aortic valve was not well visualized. No aortic stenosis. No aortic insufficiency. Normal aortic root dimension. Normal mitral valve structure and function. No significant mitral regurgitation. Tricuspid valve was not well visualized. Insignificant tricuspid regurgitation, unable to estimate RVSP. IVC not well visualized. No significant pericardial effusion is seen. Signature ----------------------------------------------------------------------------  Electronically signed by Tawny Zelaya(Interpreting physician) on  06/06/2022 05:07 PM ---------------------------------------------------------------------------- ----------------------------------------------------------------------------  Electronically signed by Marisol Jorge(Sonographer) on 06/06/2022 02:37  PM ---------------------------------------------------------------------------- FINDINGS Left Atrium Left atrium is normal in size. Left Ventricle Small LV cavity. Hyperdynamic left ventricular function. Estimated LV EF 65-70 %. Normal wall thickness. No segmental wall motion abnormalities. Grade I (mild) left ventricular diastolic dysfunction. Right Atrium Right atrium was not well visualized. Right Ventricle Normal right ventricular size and function. Mitral Valve Normal mitral valve structure and function.  No significant mitral regurgitation. Aortic Valve Aortic valve was not well visualized. No aortic insufficiency. No aortic stenosis. Tricuspid Valve Tricuspid valve was not well visualized. Insignificant tricuspid regurgitation, unable to estimate RVSP. Pulmonic Valve Pulmonic valve not well visualized but Doppler velocities are normal. No pulmonic insufficiency. Pericardial Effusion No significant pericardial effusion is seen. Miscellaneous Normal aortic root dimension. E/e' average 7.4 IVC not well visualized. M-mode / 2D Measurements & Calculations:   LVIDd:3.19 cm(3.7 - 5.6 cm)      Diastolic PRVAZZ:32.3 ml  VPHKJ:7.59 cm(2.2 - 4.0 cm)      Systolic HOYIRS:0.62 ml  DABH:3.84 cm(0.6 - 1.1 cm)       Aortic Root:2.4 cm(2.0 - 3.7 cm)  LVPWd:1.07 cm(0.6 - 1.1 cm)      LA Dimension: 2.9 cm(1.9 - 4.0 cm)  Fractional Shortenin.81 %    LA volume/Index: 17.7 ml  Calculated LVEF (%): 78.22 %     LVOT:1.9 cm   Mitral:                              Aortic   Peak E-Wave: 0.61 m/s                Peak Velocity: 1.69 m/s  Peak A-Wave: 0.86 m/s                Mean Velocity: 1.22 m/s  E/A Ratio: 0.71                      Peak Gradient: 11.42 mmHg  Peak Gradient: 1.47 mmHg             Mean Gradient: 7 mmHg  Deceleration Time: 162 msec                                        Area (continuity): 2.44 cm^2                                       AV VTI: 27.1 cm  Septal Wall E' velocity:0.08 m/s Lateral Wall E' velocity:0.08 m/s    US RETROPERITONEAL COMPLETE    Result Date: 2022  EXAMINATION: RETROPERITONEAL ULTRASOUND OF THE KIDNEYS AND URINARY BLADDER 2022 COMPARISON: None HISTORY: ORDERING SYSTEM PROVIDED HISTORY: Acute kidney injury TECHNOLOGIST PROVIDED HISTORY: Acute kidney injury FINDINGS: Kidneys: The right kidney measures 10.6 cm in length and the left kidney measures 0.7 cm in length. Kidneys demonstrate normal cortical echogenicity. No evidence of hydronephrosis or intrarenal stones.  Incidental finding of increased echogenicity through the liver consistent with hepatic steatosis. Unremarkable ultrasound kidneys Liver shows increased echogenicity suggesting hepatic steatosis without focal lesion. RECOMMENDATIONS: Unavailable     ASSESSMENT:     65years old female patient. Follows with neurology as a case of low back pain. This is a follow up visit today to the office; the patient reported having tremors which seemed to be essential tremors and also has been recently diagnosed with piriformis syndrome. PLAN:     Restart gabapentin 400 mg tid to manage both the essential tremor and back pain   RTC in 2 months   Follow up with neurosurgery and pain management     Ms. De Santiago received counseling on the following healthy behaviors: medical compliance, smoking cessation, blood pressure control, regular follow up with primary doctor.         Electronically signed by Mark Mendiola MD on 11/9/2022 at 2:02 PM

## 2022-11-09 NOTE — FLOWSHEET NOTE
[facility]   [address] [facility] [address]   Phone: [phone]   Fax: [phone]    Physical Therapy Daily Treatment Note      Date:  11/15/22    Patient Name:  Iliana Blackwell    :  1960  MRN: [id number]  Physician: Candido Brewster MD (resident physician)  Mary Serrano MD (attending physician)                                  Insurance: Sedrick Felixpjmesha JASONkamrynjesus 150. Auth required after 30 visits  **AUTHORIZATION 12 VISITS 28525 & 70050 22-22 **    Medical Diagnosis: M54.16 (ICD-10-CM) - Lumbar radiculopathy              Rehab Codes: M54.16  Onset Date: 2021 with recent exacerbation 2022                    Next 's appt: PCP 10/2022; Neurosurgeon 22; neurology 2022  Visit# / total visits: 33/43 ( authorized visits) Cancels/No Shows: 0/0    Subjective: Increased pain overall. States her lower back and L LE are biggest issues with continues L LE weakness. Feels overall deconditioned. To have injection to piriformis next Monday PM and is scheduled for arm surgery 22. [employer] Friday- bending over to get water out of case off floor and stood up and fell backwards- landed on tail bone and hit back of head; states she did not pass out but felt dazed; watched clock for 30' waiting for her daughters because she needed help to get up- still sore from fall.  Feels afraid to move due to multiple hospitalizations from pain     Pain:  [x] Yes  [] No Location:(L) Lower back into buttock, (L) posterior/lateral/anterior thigh- stops at knee; Pain Rating: (0-10 scale) 7/10  Pain altered Tx:  [x] No  [] Yes  Action:    Comments:      Objective:  Modalities:   Precautions:  Exercises:    [address] [facility] J Exercise Log  Aquatic, Hip & DLS Program- Phase 1    Date of Eval:                                Primary PTRolanda Come, PT    [facility] [id number] [facility] [address]40 22-22 **      Date 9/27/22 11/15/22 Visit # 31/36 33/43  (2/12)     Walk F/L/R 2 Laps 2 Laps     Marching 2 Laps 10x      Tall box/Light Touch Floor     Squats 10x5\" 10x5\"     Step-Ups F/L Tall 10x -     Step Down F/L       Heel/Toe Raise - 10x     SLR F/L/R - 10x     Steam Boats 10x -     Figure 8s 10x -     Hip/Knee Flex/Ext 10x -     F/L Lunges Tall 10x -            Kickboard Ex. Med Med     Iso Abd. Vert - 10x5\"     Push-pull 15x 10x     Paddling 15x             UE Format: Paddles Level 1   No Paddles     Horiz Abd/Add 1' ( 18 ) 10x     IR/ER (wipers) 1'      Alt Flex/Ext 1' ( 21 ) 10x     Alt Press Down 1'      Abd/Add 1' (25  ) 10x            Deep Water: 1 Noodle 1 Noodle     Hang 5' 5'     Cycling 2' 1'     Jacks 2' -     X-Country 2' -            Balance       SLS              Stretches       Achllies       Hamstring 2x20\" 2x20\"     Piriformis              Breast Stroke 2 Laps      Pain Rating 5 7       Specific Instructions for next treatment: Assess response to aquatics visit and progress as able    Assessment: [x] Progressing toward goals. Resumed program to tolerance- last aquatics visit 9/27/22. Decreased WB to floor level ~20% in aquatic environment. Reviewed importance of core stability and avoidance of increasing pain. Patient reports increased L foot N/T with deep water cycling but symptoms subsided upon stopping motion. Finished with deep water unloading to assist with pain; patient notes she is very fatigue during exercise and feels overall endurance is decreased. [] No change. [x] Other: Skilled PT intervention is required to help centralize/modulate sx, improve pain free lumbar AROM and maximize neuromuscular strength & stability necessary for unrestricted ADL/ work tolerances at her prior level of function.      STG: (to be met in 18 treatments)  Pt will self report worst pain no greater than 3/10 in order to better tolerate ADLs/work activities with minimal dysfunction RECENT SETBACK/HOSPITALIZATION; GOAL EXTENDED THROUGH END OF UPDATED POC 11/8  Pt will improve lumbar AROM to 90% or greater in all planes in order to demonstrate ability to move/reach in all planes unrestricted at PLOF RECENT SETBACK/HOSPITALIZATION; GOAL EXTENDED THROUGH END OF UPDATED POC 11/8  Pt will improve trunk strength to 4/5 or greater in all planes to show improved stability at prior level RECENT SETBACK/HOSPITALIZATION; GOAL EXTENDED THROUGH END OF UPDATED POC 11/8  Pt will self report reduced peripheral L LE pain (no distal than L knee) to demonstrate initial  favorable centralization response to therapy RECENT SETBACK/HOSPITALIZATION; GOAL EXTENDED THROUGH END OF UPDATED POC 11/8  LTG: (to be met in 43 treatments)  Pt will demonstrate improved B LE strength to 4/5 or greater in order to demonstrate improved stability/strength necessary for unrestricted ADLs/work activities RECENT SETBACK/HOSPITALIZATION; GOAL EXTENDED THROUGH END OF UPDATED POC 11/8  Pt will self report ability to complete all ADLs with pain no greater than 1/10 to demonstrate unrestricted functional tolerances at prior level RECENT SETBACK/HOSPITALIZATION; GOAL EXTENDED THROUGH END OF UPDATED POC 11/8  Pt will improve core strength Sahrmann Grade 2/5 or greater to demonstrate better support and stability to lumbar spine RECENT SETBACK/HOSPITALIZATION; GOAL EXTENDED THROUGH END OF UPDATED POC 11/8  Pt will decrease SHEY to 10% impaired or less in order to demonstrate improved functional tolerances at PLOF with minimal restriction/dysfunction RECENT SETBACK/HOSPITALIZATION; GOAL EXTENDED THROUGH END OF UPDATED POC 11/8  Pt will demonstrate independence with a long term HEP for continued progress/maintenance after completion of PT GOAL PROGRESSING 11/8      Patient Goals/Rehab Expectations: To alleviate pain and get back to normal    Pt.  Education:  [] Yes  [] No  [x] Reviewed Prior HEP/Ed- Postural awareness and avoidance of increasing pain  Method of Education: [x] Verbal  [x] Demo  [] Written  Comprehension of Education:   [x] Verbalizes understanding. [x] Demonstrates understanding. [] Needs review. [] Demonstrates/verbalizes HEP/Ed previously given. Plan: [] Continue per plan of care.    [x] Other: Re-assess next visit      Treatment Charges: Mins Units   []  Modalities     []  Ther Exercise     []  Manual Therapy     []  Ther Activities     [x]  Aquatics 35 2   []  Neuromuscular     [] Vasocompression     [] Gait Training     [] Dry needling        [] 1 or 2 muscles        [] 3 or more muscles     []  Other     Total Treatment time 35 2     Time In: 341 PM        Time Out: 407 PM      Electronically signed by:  Miller Diaz PTA

## 2022-11-10 RX ORDER — MAGNESIUM OXIDE 400 MG/1
400 TABLET ORAL DAILY
Qty: 30 TABLET | Refills: 3 | Status: SHIPPED | OUTPATIENT
Start: 2022-11-10

## 2022-11-10 NOTE — TELEPHONE ENCOUNTER
Incoming fax requesting refill for Magnesium Oxide 400 (240mg). Please review and e-scribe if applicable.      Last Visit Date: 11/09/22    Next Visit Date:  Future Appointments   Date Time Provider Nicola Zaidi   11/15/2022  2:30 PM Christy Millan, OT [facility]   11/15/2022  3:30 PM Genny Goldberg, PTA [facility]   11/17/2022  1:00 PM Christy Millan, OT [facility]   11/17/2022  2:00 PM Genny Goldberg, [place] [facility]   11/21/2022  9:00 AM Kari White, PTA [facility]   11/22/2022  8:30 AM Selwyn Phelan DO [facility]   11/22/2022  3:30 PM Genny Goldberg, PTA [facility]   12/6/2022 10:30 AM Kristina Peabody, MD [facility]   12/8/2022 10:00 AM Gillermo Felty, APRN - CNP [facility]   1/20/2023 10:30 AM Selwyn Phelan DO [facility]   2/15/2023  3:30 PM Omkar Small MD [facility] -

## 2022-11-15 ENCOUNTER — HOSPITAL ENCOUNTER (OUTPATIENT)
Dept: PHYSICAL THERAPY | Age: 62
Setting detail: THERAPIES SERIES
Discharge: HOME | End: 2022-11-15
Payer: COMMERCIAL

## 2022-11-15 ENCOUNTER — HOSPITAL ENCOUNTER (OUTPATIENT)
Dept: OCCUPATIONAL THERAPY | Age: 62
Setting detail: THERAPIES SERIES
Discharge: HOME | End: 2022-11-15
Payer: COMMERCIAL

## 2022-11-15 PROCEDURE — 97110 THERAPEUTIC EXERCISES: CPT

## 2022-11-15 PROCEDURE — 97113 AQUATIC THERAPY/EXERCISES: CPT

## 2022-11-15 NOTE — FLOWSHEET NOTE
[] [place] [facility]       Occupational Therapy            1st floor       [address] [place], [place]         Phone: [phone]       Fax: [phone] [] [facility] Occupational Therapy  [address]  [place], [place]  Phone: [phone]  Fax: [phone]  [x] [facility] ([facility]) @ [facility]  [address] [facility] [address]  [address] [facility]  Phone [phone]  Fax [phone]      Occupational Therapy Daily Treatment Note    Date:  11/15/2022  Patient Name:  Debi Jordan    :  1960  MRN: [id number]  Referring Provider:  Uli Guan MD  Insurance: [employer] approved for 12 visits (-)  Medical Diagnosis: Retrolisthesis of vertebrae (M43.10) Bilateral Carpal Tunnel (G56.03)     Rehab Codes: pain in right upper arm M79.621,, pain in left upper arm M79.622, , lack of coordination unspecified R27.0,, or parasthesia of skin R20.2,  Onset Date: 22                 Next [address]: 22  Visit# / total visits: ; Progress note for Medicare patient due at visit 8-9    Cancels/No Shows: 0/0      Subjective:    Pain:  [x] Yes  [] No Location: Low back Pain Rating: (0-10 scale) 8/10  Pain altered Tx:  [x] No  [] Yes  Action:  Pt Comments: pt reports 8/10 LBP. Pt reports she was reaching down to  bottles of water and fell backwards and laid on floor for 30 mins before daughters could help her up.       Objective:  Modalities:  Precautions: N/A  Exercises:  Exrcise Reps/Time Weight/Level Comments   Tendon glide 10   Completed   Ulnar nerve glide 2x10   Shoulder 90 w/ elbow flex/ext   Completed   gripping 2 minutes yellow Theraputty    Thumb opposition 10   Completed   Scapula retraction   3x10 Green   Completed    Shoulder extention 3x10   Green  Completed   Tricep extension 3x10 green    Clip placing  Yellow/red/green/Blue/Black Completed   [address] 4 rows   Not completed   Placing pegs in board   Rows 3x10 green Not completed   PNF 3x10 Green  Not completed  rt UE going from rt shoulder to lt hip 10x , PNF diagonal lt UE going from lt shoudler to rt hip   Flexbar  Wrist Flexion/Ext  Wrist pronation  Wrist supination 3x10 red completed   Serratus anterior wall slide w/ lift off 2x10 AROM  Not Completed    Power web 3x10 Green Flexion/extension    Other:      Treatment Charges: Mins Units   []  Modalities:      []  Ultrasound     [x]  Ther Exercise 42 3   []  Manual Therapy     []  Ther Activities     []  Orthotic fit/train     []  Orthotic recheck     []  Other     Total Treatment time 42 mins 3       Assessment: [x] Progressing toward goals. Pt arrived to OT session reporting she is ready to get her cubital tunnel release. Began OT session w/ /pinching exercises. Pt displayed mod difficulty w/ /pinch exercises. Cont w/ B UE postural strengthening exercises. Pt fatigued quickly today w/  and pinch strengthening exercises. [] No change. [] Other     [x] Patient would continue to benefit from skilled occupational therapy services in order to: Improve, Increase, and Restore  functional /grasp, Motor control/Tone in UE, Strength, Activity tolerance, and Coordination deficit in order to improve functional use of UE in ADL performance and return to PLOF     STG/LTG  Short Term Goals: (  8    Treatments)  Decrease numbness/tingling per subjective report   Increase strength (pounds);  strength by 5 lbs  Increase pinch strength by 3lbs  Pt will increase 9-hole pef test by 10 seconds on each UE  Increase function:UE Functional Index Score 8 or more points to promote increased functional abilities  Demo knowledge of fall prevention in 2 sessions. Patient to be independent with home exercise program as demonstrated by performance with correct form without cues.      Long Term Goals: (  16  Treatments)  Pt will increase 9 hole peg test time by 15 seconds  Pt will improve UEFI score by 15 points  Pt will increase  strength by 10 lbs  Pt will increase pinch strength by 5 lbs    Pt. Education:  [] Yes  [x] No  [] Reviewed Prior HEP/Ed  Method of Education: [] Verbal  [] Demo  [] Written  Re:  Comprehension of Education:  [] Verbalizes understanding. [] Demonstrates understanding. [] Needs review. [] Demonstrates/verbalizes HEP/Ed previously given. Plan: [x] Continue current frequency toward short and long term goals.   [x] Specific Instructions for subsequent treatments: POC to formally DC for outpatient OT at next visit prior to cubital tunnel release on 11/23/22   [] Other:       Time In: 1430  Time Out: 1512      Electronically signed by:  YAAKOV Francois/CARMEN

## 2022-11-17 ENCOUNTER — HOSPITAL ENCOUNTER (OUTPATIENT)
Dept: PHYSICAL THERAPY | Age: 62
Setting detail: THERAPIES SERIES
Discharge: HOME | End: 2022-11-17
Payer: COMMERCIAL

## 2022-11-17 ENCOUNTER — HOSPITAL ENCOUNTER (OUTPATIENT)
Dept: OCCUPATIONAL THERAPY | Age: 62
Setting detail: THERAPIES SERIES
Discharge: HOME | End: 2022-11-17
Payer: COMMERCIAL

## 2022-11-17 PROCEDURE — 97113 AQUATIC THERAPY/EXERCISES: CPT

## 2022-11-17 PROCEDURE — 97110 THERAPEUTIC EXERCISES: CPT

## 2022-11-17 NOTE — DISCHARGE SUMMARY
[] [facility]       Occupational Therapy             1st floor       [address] [place], [place]         Phone: [phone]       Fax: [phone]  [x] [facility] ([facility]) @ [facility]  [address] [facility] [address]  [place], [address] [facility]  Phone [phone]  Fax [phone]         Occupational Therapy Discharge Note    Date: 2022      Patient: Cornell Jacob  : 1960  MRN: [id number]    Referring Provider:  Kwame Redmond MD  Insurance: Other [facility]  Medical Diagnosis: Retrolisthesis of vertebrae (M43.10) Bilateral Carpal Tunnel (G56.03)     Rehab Codes: pain in right upper arm M79.621,, pain in left upper arm M79.622, , lack of coordination unspecified R27.0,, or parasthesia of skin R20.2,  Onset Date: 22                 Next [address]: 22  Visit# / total visits: 3/12; Progress note for Medicare patient due at visit 8-9                       Total visits attended: 6  Cancels/No shows: 0/1  Date of initial visit: 22                Date of final visit: 22      Subjective  See PN dated  for details   Pain:  [] Yes  [] No  Location:  N/A Pain Rating: (0-10 scale) /10  Pain altered Tx:  [] No  [] Yes  Action:  Comments:    Objective:See PN dated  for details  Test Measurements:  Function:    Assessment:See PN dated  for details  Short Term Goals: (  8    Treatments)  Decrease numbness/tingling per subjective report Not Met  Increase strength (pounds);  strength by 5 lbsMet  Increase pinch strength by 3lbsMet  Pt will increase 9-hole pef test by 10 seconds on each UE Not Met  Increase function:UE Functional Index Score 8 or more points to promote increased functional abilities Met  Demo knowledge of fall prevention in 2 sessions. Not Met   Patient to be independent with home exercise program as demonstrated by performance with correct form without cues.  Met     Long Term Goals: (  16  Treatments)  Pt will increase 9 hole peg test time by 15 seconds Not Met  Pt will improve UEFI score by 15 points Not Met  Pt will increase  strength by 10 lbs Not Met  Pt will increase pinch strength by 5 lbs Not Met    Treatment to Date:     [x]  Therapeutic Exercise   55287              []  Iontophoresis: 4 mg/mL Dexamethasone Sodium Phosphate  mAmin  34131    [x]  Therapeutic Activity  03277  []  Vasopneumatic cold with compression  67383                []  ADL training  60565  []  Ultrasound        77780    []  Neuromuscular Re-education  83714  []  Electrical Stimulation Attended  59974    [x]  Manual Therapy  92583  Orthotic    []  Fit  01524     []  Train 11452    []  Instruction in HEP        Prosthetic    []  Fit [id number]      []  Train [id number]    []  Cognitive Interventions, (first 15 min 47481, subsequent 15 min 36912)  []  Cold/hotpack      [x]  Massage   31394             Discharge Status:     [] Pt recovered from conditions. Treatment goals were met. [] Pt received maximum benefit. No further therapy indicated at this time. [] Pt to continue exercise/home instructions independently. [x] Therapy interrupted due to: Upcoming cubital tunnel release on 11/23/22    [] Pt has 2 or more no shows/cancels, is discontinued per our policy. [] Pt has completed prescribed number of treatment sessions. [] Other:         Electronically signed by: Ashlyn Geiger OTR/L    If you have any questions or concerns, please don't hesitate to call.   Thank you for your referral.

## 2022-11-17 NOTE — FLOWSHEET NOTE
[] [place]       Occupational Therapy            1st floor       [address] [facility]         Phone: [phone]       Fax: [phone] [] [facility] Occup[facility] Therapy  [address] [facility]  Phone: [phone]  Fax: [phone]  [x] [facility] ([facility]) @ [facility]  [address] [facility] [address]  [place], [address] [place] [address] [facility]  Phone [phone]  Fax [phone]      Occupational Therapy Daily Treatment Note    Date:  2022  Patient Name:  Debi Jordan    :  1960  MRN: [id number]  Referring Provider:  Uli Guan MD  Insurance: [employer] approved for 12 visits (-)  Medical Diagnosis: Retrolisthesis of vertebrae (M43.10) Bilateral Carpal Tunnel (G56.03)     Rehab Codes: pain in right upper arm M79.621,, pain in left upper arm M79.622, , lack of coordination unspecified R27.0,, or parasthesia of skin R20.2,  Onset Date: 22                 Next Dr. Candyce Apgar: 22  Visit# / total visits: 3/12; Progress note for Medicare patient due at visit 8-9    Cancels/No Shows: 0/0      Subjective:    Pain:  [x] Yes  [] No Location: Low back Pain Rating: (0-10 scale) 8/10  Pain altered Tx:  [x] No  [] Yes  Action:  Pt Comments: pt reports she is ready for cubital tunnel release and trying to take it easy prior to sx.      Objective:  Modalities:  Precautions: N/A  Exercises:  Exrcise Reps/Time Weight/Level Comments   Tendon glide 10   Completed   Ulnar nerve glide 2x10   Shoulder 90 w/ elbow flex/ext   Completed   gripping 2 minutes yellow Theraputty    Thumb opposition 10   Completed   Scapula retraction   3x10 Green   Completed    Shoulder extention 3x10   Green  Completed   Tricep extension 3x10 green    Clip placing  Yellow/red/green/Blue/Black Completed   [address] 4 rows   Not completed   Placing pegs in board   Rows 3x10 green Completed   PNF 3x10 Green  Not completed  rt UE going from rt shoulder to lt hip 10x , PNF diagonal lt UE going from lt shoudler to rt hip   Flexbar  Wrist Flexion/Ext  Wrist pronation  Wrist supination 3x10 red Completed   Serratus anterior wall slide w/ lift off 2x10 AROM  Completed    Power web 3x10 Green Flexion/extension    Other:      Treatment Charges: Mins Units   []  Modalities:      []  Ultrasound     [x]  Ther Exercise 43 3   []  Manual Therapy     []  Ther Activities     []  Orthotic fit/train     []  Orthotic recheck     []  Other     Total Treatment time 43 mins 3       Assessment: [x] Progressing toward goals. POC to formally DC pt from OT at this time prior to cubital tunnel release 11/23/22. See DC note dated 11/17 for more details. Cont OT session w/ /pinching exercises. Pt displayed mod difficulty w/ /pinch exercises. Cont w/ B UE postural strengthening exercises. [] No change. [] Other     [x] Patient would continue to benefit from skilled occupational therapy services in order to: Improve, Increase, and Restore  functional /grasp, Motor control/Tone in UE, Strength, Activity tolerance, and Coordination deficit in order to improve functional use of UE in ADL performance and return to PLOF     STG/LTG  Short Term Goals: (  8    Treatments)  Decrease numbness/tingling per subjective report   Increase strength (pounds);  strength by 5 lbs  Increase pinch strength by 3lbs  Pt will increase 9-hole pef test by 10 seconds on each UE  Increase function:UE Functional Index Score 8 or more points to promote increased functional abilities  Demo knowledge of fall prevention in 2 sessions. Patient to be independent with home exercise program as demonstrated by performance with correct form without cues. Long Term Goals: (  16  Treatments)  Pt will increase 9 hole peg test time by 15 seconds  Pt will improve UEFI score by 15 points  Pt will increase  strength by 10 lbs  Pt will increase pinch strength by 5 lbs    Pt.  Education:  [x] Yes  [] No  [] Reviewed Prior HEP/Ed  Method of Education: [x] Verbal  [] Demo  [] Written  Re: discussed possible start of therapy follow cubital tunnel release will be up to her MD but is typically 4-6 weeks post-op  Comprehension of Education:  [] Verbalizes understanding. [] Demonstrates understanding. [] Needs review. [] Demonstrates/verbalizes HEP/Ed previously given. Plan: [x] Continue current frequency toward short and long term goals. [x] Specific Instructions for subsequent treatments:  Will formally DC pt at this time from OT.   [] Other:       Time In: 1300  Time Out: Yoko Mahan 47      Electronically signed by:  Janeen Edmonds OTR/L

## 2022-11-17 NOTE — FLOWSHEET NOTE
[facility] Outpatient Physical Therapy   [address]   Phone: [phone]   Fax: [phone]    Physical Therapy Daily Treatment Note      Date:  22    Patient Name:  Cornell Jacob    :  1960  MRN: [id number]  Physician: Nancy Pascual MD (resident physician)  Renuka Santos MD (attending physician)                                  Insurance: Sedrick Garciamechehellen Franklinjesus 150. Auth required after 30 visits  **AUTHORIZATION 12 VISITS [id number] & [id number] 22-22 **    Medical Diagnosis: M54.16 (ICD-10-CM) - Lumbar radiculopathy              Rehab Codes: M54.16  Onset Date: 2021 with recent exacerbation 2022                    Next 's appt: PCP 10/2022; Neurosurgeon 22; neurology 2022  Visit# / total visits: 34/43 (3/12 authorized visits) Cancels/No Shows: 0/0    Subjective: Lower back and left buttock very sore today. States she is nervous for her injection and surgery next week. Sore and tired after resuming OT/Aquatics las tvisit     Pain:  [x] Yes  [] No Location:(L) Lower back into buttock, (L) posterior/lateral/anterior thigh- stops at knee; Pain Rating: (0-10 scale) 7-8/10  Pain altered Tx:  [x] No  [] Yes  Action:    Comments:      Objective:  Modalities:   Precautions:  Exercises:    [facility] J Exercise Log  Aquatic, Hip & DLS Program- Phase 1    Date of Eval:                                Primary PTLutricia Snipe, PT    [place] 12 VIS[place] [id number] & [id number] 22-22 **      Date 9/27/22 11/15/22 11/17/22    Visit # 31/36 33/43  () 34/43  (3/12)    Walk F/L/R 2 Laps 2 Laps 2 Laps    Marching 2 Laps 10x 10x     Tall box/Light Touch Floor Floor    Squats 10x5\" 10x5\" 10x5\"    Step-Ups F/L Tall 10x -     Step Down F/L       Heel/Toe Raise - 10x 10x    SLR F/L/R - 10x 10x    Steam Boats 10x -     Figure 8s 10x -     Hip/Knee Flex/Ext 10x - 10x    F/L Lunges Tall 10x -            Kickboard Ex.  Med Med Med    Iso Abd. Vert - 10x5\" 10x5\"    Push-pull 15x 10x 10x    Paddling 15x             UE Format: Paddles Level 1   No Paddles No Paddles    Horiz Abd/Add 1' ( 18 ) 10x 10x    IR/ER (wipers) 1'  10x    Alt Flex/Ext 1' ( 21 ) 10x 10x    Alt Press Down 1'  10x    Abd/Add 1' (25  ) 10x 10x           Deep Water: 1 Noodle 1 Noodle 1 Noodle    Hang 5' 5' 5'    Cycling 2' 1' 1'    Jacks 2' - 1'    X-Country 2' - 1'           Balance       SLS              Stretches       Achllies       Hamstring 2x20\" 2x20\"     Piriformis              Breast Stroke 2 Laps      Pain Rating 5 7       Specific Instructions for next treatment: Resume aquatics post surgery as able    Assessment: [x] Progressing toward goals. Progressed with UE format and deep water aerobics to tolerance. Also resumed hip/knee flex/ext without issue. Patient denied increased left lower back or buttock pain throughout session     [] No change. [x] Other: Skilled PT intervention is required to help centralize/modulate sx, improve pain free lumbar AROM and maximize neuromuscular strength & stability necessary for unrestricted ADL/ work tolerances at her prior level of function.      STG: (to be met in 18 treatments)  Pt will self report worst pain no greater than 3/10 in order to better tolerate ADLs/work activities with minimal dysfunction RECENT SETBACK/HOSPITALIZATION; GOAL EXTENDED THROUGH END OF UPDATED POC 11/8  Pt will improve lumbar AROM to 90% or greater in all planes in order to demonstrate ability to move/reach in all planes unrestricted at PLOF RECENT SETBACK/HOSPITALIZATION; GOAL EXTENDED THROUGH END OF UPDATED POC 11/8  Pt will improve trunk strength to 4/5 or greater in all planes to show improved stability at prior level RECENT SETBACK/HOSPITALIZATION; GOAL EXTENDED THROUGH END OF UPDATED POC 11/8  Pt will self report reduced peripheral L LE pain (no distal than L knee) to demonstrate initial  favorable centralization response to therapy RECENT SETBACK/HOSPITALIZATION; GOAL EXTENDED THROUGH END OF UPDATED POC 11/8  LTG: (to be met in 43 treatments)  Pt will demonstrate improved B LE strength to 4/5 or greater in order to demonstrate improved stability/strength necessary for unrestricted ADLs/work activities RECENT SETBACK/HOSPITALIZATION; GOAL EXTENDED THROUGH END OF UPDATED POC 11/8  Pt will self report ability to complete all ADLs with pain no greater than 1/10 to demonstrate unrestricted functional tolerances at prior level RECENT SETBACK/HOSPITALIZATION; GOAL EXTENDED THROUGH END OF UPDATED POC 11/8  Pt will improve core strength Sahrmann Grade 2/5 or greater to demonstrate better support and stability to lumbar spine RECENT SETBACK/HOSPITALIZATION; GOAL EXTENDED THROUGH END OF UPDATED POC 11/8  Pt will decrease SHEY to 10% impaired or less in order to demonstrate improved functional tolerances at PLOF with minimal restriction/dysfunction RECENT SETBACK/HOSPITALIZATION; GOAL EXTENDED THROUGH END OF UPDATED POC 11/8  Pt will demonstrate independence with a long term HEP for continued progress/maintenance after completion of PT GOAL PROGRESSING 11/8      Patient Goals/Rehab Expectations: To alleviate pain and get back to normal    Pt. Education:  [] Yes  [x] No  [] Reviewed Prior HEP/Ed- P  Method of Education: [] Verbal  [] Demo  [] Written  Comprehension of Education:   [] Verbalizes understanding. [] Demonstrates understanding. [] Needs review. [] Demonstrates/verbalizes HEP/Ed previously given.      Plan: [x] Continue per plan of care post surgery once cleared   [] Other:       Treatment Charges: Mins Units   []  Modalities     []  Ther Exercise     []  Manual Therapy     []  Ther Activities     [x]  Aquatics 39 3   []  Neuromuscular     [] Vasocompression     [] Gait Training     [] Dry needling        [] 1 or 2 muscles        [] 3 or more muscles     []  Other     Total Treatment time 39 3     Time In: 157 PM        Time Out: 415 PM      Electronically signed by:  Nathan Wayne PTA

## 2022-11-18 NOTE — TELEPHONE ENCOUNTER
E-scribe request for med refill. Please review and e-scribe if applicable. Last Visit Date:  11/08/2022  Next Visit Date:  12/6/2022    Hemoglobin A1C (%)   Date Value   11/08/2022 7.7   08/25/2022 6.9   05/26/2022 7.9             ( goal A1C is < 7)   Microalb/Crt.  Ratio (mcg/mg creat)   Date Value   09/22/2022 Can not be calculated     LDL Cholesterol (mg/dL)   Date Value   06/24/2021 52       (goal LDL is <100)   AST (U/L)   Date Value   10/04/2022 35 (H)     ALT (U/L)   Date Value   10/04/2022 58 (H)     BUN (mg/dL)   Date Value   10/19/2022 33 (H)     BP Readings from Last 3 Encounters:   11/09/22 102/71   11/08/22 132/84   10/25/22 125/78          (goal 120/80)        Patient Active Problem List:     Cervical spondylosis     Type 2 diabetes mellitus without complication, without long-term current use of insulin (HCC)     Hypertension     Abnormal auditory perception     Nasal polyps     Osteoarthritis of left knee     Osteoarthritis of right knee     DIEGO (nonalcoholic steatohepatitis)     Vitamin D deficiency     Iron deficiency anemia     Dyslipidemia     Trochanteric bursitis     Chronic left shoulder pain     Restless legs syndrome     Generalized anxiety disorder     Toxic metabolic encephalopathy     Class 2 obesity in adult     Leg edema     Recurrent major depressive disorder (HCC)     Dermatitis     Retrolisthesis of vertebrae     Bilateral carpal tunnel syndrome     Intention tremor     Sciatica     Back pain with radiation     Left sciatic nerve pain     Lumbar disc disease with radiculopathy     Intractable back pain     Piriformis syndrome of left side      ----Leatha January

## 2022-11-18 NOTE — PROGRESS NOTES
Last visit: 11/8/22  Last Med refill: 4/6/19  Does patient have enough medication for 72 hours: Yes    Next Visit Date:  Future Appointments   Date Time Provider Nicola Dyan   11/21/2022  9:00 AM Ronnie Brownlee, PTA [facility]   11/22/2022  8:30 AM Madi Chua DO [facility]   11/22/2022  3:30 PM Nathan Wayne PTA [facility] 1 [facility]   12/6/2022 10:30 AM Edward Chand MD [id number] [facility]   12/8/2022 10:00 AM Sumit Purvis APRN - CNP [facility]   1/20/2023 10:30 AM Madi Chua DO [facility]   2/15/2023  3:30 PM Omkar Hooepr MD Neuro [facility] Neurology -       Health Maintenance   Topic Date Due    Shingles vaccine (1 of 2) Never done    Diabetic retinal exam  11/01/2017    Diabetic foot exam  12/01/2017    COVID-19 Vaccine (3 - Booster for Moderna series) 04/08/2021    Lipids  06/24/2022    Colorectal Cancer Screen  09/20/2022    Breast cancer screen  11/14/2022    Depression Monitoring  05/26/2023    Diabetic microalbuminuria test  09/22/2023    A1C test (Diabetic or Prediabetic)  11/08/2023    DTaP/Tdap/Td vaccine (3 - Td or Tdap) 08/25/2032    Flu vaccine  Completed    Pneumococcal 0-64 years Vaccine  Completed    Hepatitis C screen  Completed    HIV screen  Completed    Hepatitis A vaccine  Aged Out    Hib vaccine  Aged Out    Meningococcal (ACWY) vaccine  Aged Out       Hemoglobin A1C (%)   Date Value   11/08/2022 7.7   08/25/2022 6.9   05/26/2022 7.9             ( goal A1C is < 7)   Microalb/Crt.  Ratio (mcg/mg creat)   Date Value   09/22/2022 Can not be calculated     LDL Cholesterol (mg/dL)   Date Value   06/24/2021 52   04/20/2021 48       (goal LDL is <100)   AST (U/L)   Date Value   10/04/2022 35 (H)     ALT (U/L)   Date Value   10/04/2022 58 (H)     BUN (mg/dL)   Date Value   10/19/2022 33 (H)     BP Readings from Last 3 Encounters:   11/09/22 102/71   11/08/22 132/84   10/25/22 125/78          (goal 120/80)    All Future Testing planned in [facility]  Lab Frequency Next Occurrence   Saline lock IV Once 05/10/2022   Lipid Panel Once 06/26/2022   Comprehensive Metabolic Panel Once 37/20/3438   Vitamin D 25 Hydroxy Once 05/26/2022               Patient Active Problem List:     Cervical spondylosis     Type 2 diabetes mellitus without complication, without long-term current use of insulin (HCC)     Hypertension     Abnormal auditory perception     Nasal polyps     Osteoarthritis of left knee     Osteoarthritis of right knee     DIEGO (nonalcoholic steatohepatitis)     Vitamin D deficiency     Iron deficiency anemia     Dyslipidemia     Trochanteric bursitis     Chronic left shoulder pain     Restless legs syndrome     Generalized anxiety disorder     Toxic metabolic encephalopathy     Class 2 obesity in adult     Leg edema     Recurrent major depressive disorder (HCC)     Dermatitis     Retrolisthesis of vertebrae     Bilateral carpal tunnel syndrome     Intention tremor     Sciatica     Back pain with radiation     Left sciatic nerve pain     Lumbar disc disease with radiculopathy     Intractable back pain     Piriformis syndrome of left side

## 2022-11-20 RX ORDER — BLOOD-GLUCOSE CONTROL, LOW
1 EACH MISCELLANEOUS 3 TIMES DAILY
Qty: 100 EACH | Refills: 3 | Status: SHIPPED | OUTPATIENT
Start: 2022-11-20

## 2022-11-21 ENCOUNTER — HOSPITAL ENCOUNTER (OUTPATIENT)
Dept: PHYSICAL THERAPY | Age: 62
Setting detail: THERAPIES SERIES
End: 2022-11-21
Payer: COMMERCIAL

## 2022-11-22 ENCOUNTER — TELEPHONE (OUTPATIENT)
Dept: PHARMACY | Facility: CLINIC | Age: 62
End: 2022-11-22

## 2022-11-22 ENCOUNTER — APPOINTMENT (OUTPATIENT)
Dept: PHYSICAL THERAPY | Age: 62
End: 2022-11-22
Payer: COMMERCIAL

## 2022-11-22 ENCOUNTER — ANESTHESIA EVENT (OUTPATIENT)
Dept: OPERATING ROOM | Age: 62
End: 2022-11-22
Payer: COMMERCIAL

## 2022-11-22 ENCOUNTER — OFFICE VISIT (OUTPATIENT)
Dept: NEUROSURGERY | Age: 62
End: 2022-11-22
Payer: COMMERCIAL

## 2022-11-22 VITALS
BODY MASS INDEX: 31.89 KG/M2 | HEART RATE: 90 BPM | OXYGEN SATURATION: 98 % | WEIGHT: 180 LBS | DIASTOLIC BLOOD PRESSURE: 79 MMHG | HEIGHT: 63 IN | SYSTOLIC BLOOD PRESSURE: 124 MMHG

## 2022-11-22 DIAGNOSIS — G57.02 PIRIFORMIS SYNDROME OF LEFT SIDE: Primary | ICD-10-CM

## 2022-11-22 DIAGNOSIS — G56.22 ULNAR NEUROPATHY AT ELBOW, LEFT: ICD-10-CM

## 2022-11-22 PROCEDURE — 3078F DIAST BP <80 MM HG: CPT | Performed by: NEUROLOGICAL SURGERY

## 2022-11-22 PROCEDURE — 99214 OFFICE O/P EST MOD 30 MIN: CPT | Performed by: NEUROLOGICAL SURGERY

## 2022-11-22 PROCEDURE — 3074F SYST BP LT 130 MM HG: CPT | Performed by: NEUROLOGICAL SURGERY

## 2022-11-22 RX ORDER — METHOCARBAMOL 750 MG/1
TABLET, FILM COATED ORAL
COMMUNITY
Start: 2022-11-08

## 2022-11-22 NOTE — PROGRESS NOTES
Department of Neurosurgery                                                      Follow up visit      History Obtained From:  patient    CHIEF COMPLAINT:         Chief Complaint   Patient presents with    Neurologic Problem     Intention tremor       HISTORY OF PRESENT ILLNESS:       The patient is a 65 y.o. female who presents for follow up for ulnar neuropathy and piriformis syndrome. She reports increase hand weakness since her last visit with me and she would like to proceed with cubital tunnel decompression.  She had recent visit with pain management for her piriformis syndrome and has not yet had much relief    PAST MEDICAL HISTORY :       Past Medical History:        Diagnosis Date    Cervical spondylosis 04/2009    c-4 c-5, c-5 c-6, c-6 c-7    CKD (chronic kidney disease)     per pt, does not have nephrologist    COVID-19 12/06/2021    nasal congestion, fatique and myalgia x 3 weeks    Generalized anxiety disorder 12/08/2020    GERD (gastroesophageal reflux disease)     History of recent hospitalization 09/30/2022    til 10/9/22 at [facility]. V's    History of swelling of feet     lasix QOD for this    Hyperlipidemia     Hypertension     Left hand weakness     and pain, can not make fist d/t cubital tunnel issue    Lumbar radiculopathy 11/10/2021    was following with pain management and has had epidural injections    DIEGO (nonalcoholic steatohepatitis) 10/12/2014    Obesity     Osteoarthritis of left knee 08/19/2014    Recurrent major depressive disorder ([facility] [address] 07/15/2022    Restless legs syndrome     Snores 06/06/2022    was seen by Dr. Anisa Quinn for likely sleep apnea while hospitalized , to follow up as OP for sleep study, has not been done    TIA (transient ischemic attack) 2005    no residual symptoms    Type II or unspecified type diabetes mellitus without mention of complication, not stated as uncontrolled     Wears glasses     Wellness examination     PCP, Dr. Komal Mckeon, last seen       Past Surgical History:        Procedure Laterality Date     SECTION      x2    COLONOSCOPY  2012    ? polyps per pt    HYSTERECTOMY, TOTAL ABDOMINAL (CERVIX REMOVED)      with BSO    SHOULDER SURGERY Left 2021    SHOULDER MANIPULATION WITH PRE OP INTERSCALENE BLOCK and intraop injection performed by Jenelle Park DO at [facility] Left 10/03/2022    S1 nerve root injection, Dr. Ruiz Hills Left 10/06/2022    S1 NERVE ROOT INJECTION performed by Palak Christian MD at [address]      as child    UPPER GASTROINTESTINAL ENDOSCOPY  2012    [facility]       Social History:   Social History     Socioeconomic History    Marital status:      Spouse name: Not on file    Number of children: Not on file    Years of education: Not on file    Highest education level: Not on file   Occupational History    Not on file   Tobacco Use    Smoking status: Never    Smokeless tobacco: Never   Vaping Use    Vaping Use: Never used   Substance and Sexual Activity    Alcohol use: Yes     Alcohol/week: 0.0 standard drinks     Comment: rarely/ 4 times a year    Drug use: Never    Sexual activity: Yes   Other Topics Concern    Not on file   Social History Narrative    Not on file     Social Determinants of Health     Financial Resource Strain: Low Risk     Difficulty of Paying Living Expenses: Not hard at all   Food Insecurity: No Food Insecurity    Worried About [facility] in the Last Year: Never true    [facility] in the Last Year: Never true   Transportation Needs: No Transportation Needs    Lack of Transportation (Medical): No    Lack of Transportation (Non-Medical): No   Physical Activity: Unknown    Days of Exercise per Week: 0 days    Minutes of Exercise per Session: Not on file   Stress: Stress Concern Present    Feeling of Stress :  To some extent   Social Connections: Socially Integrated    Frequency of Communication with Friends and Family: More than three times a week    Frequency of Social Gatherings with Friends and Family: Once a week    Attends Adventism Services: More than 4 times per year    Active Member of [employer] or Organizations: Yes    Attends Club or Organization Meetings: More than 4 times per year    Marital Status:    Intimate Partner Violence: Not At Risk    Fear of Current or Ex-Partner: No    Emotionally Abused: No    Physically Abused: No    Sexually Abused: No   Housing Stability: Low Risk     Unable to Pay for Housing in the Last Year: No    Number of [place] in the Last Year: 1    Unstable Housing in the Last Year: No       Family History:       Problem Relation Age of Onset    Heart Attack Mother     Diabetes Mother     Diabetes Father     Heart Attack Father        Allergies:  Codeine    Home Medications:  Prior to Admission medications    Medication Sig Start Date End Date Taking? Authorizing Provider   methocarbamol (ROBAXIN) 750 MG tablet  11/8/22  Yes Historical Provider, MD   Prodigy Lancets 28G MISC 1 Bottle by Does not apply route three times daily 11/20/22  Yes Janet Regalado DO   magnesium oxide (MAG-OX) 400 MG tablet Take 1 tablet by mouth daily 11/10/22  Yes Daria Snell MD   gabapentin (NEURONTIN) 400 MG capsule Take 1 capsule by mouth 3 times daily for 30 days. 11/9/22 12/9/22 Yes Mo'Rosanne Gaspar MD   ASPIRIN LOW DOSE 81 MG EC tablet TAKE 1 TABLET BY MOUTH ONE TIME A DAY 11/8/22  Yes Tariq Rapp MD   diazePAM (VALIUM) 2 MG tablet Take 1 tablet by mouth every 6 hours as needed for Anxiety for up to 20 doses. 10/25/22 11/30/22 Yes Rachid Juarez MD   oxyCODONE (ROXICODONE) 5 MG immediate release tablet Take 2 tablets by mouth every 4 hours as needed for Pain for up to 30 days. 10/25/22 11/24/22 Yes Rachid Juarez MD   busPIRone (BUSPAR) 10 MG tablet Take 10 mg by mouth in the morning and 10 mg in the evening.    Yes Historical Provider, MD   celecoxib (CELEBREX) 100 MG capsule Take 100 mg by mouth 2 times daily   Yes Historical Provider, MD   glipiZIDE (GLUCOTROL) 10 MG tablet Take 10 mg by mouth 2 times daily (before meals)   Yes Historical Provider, MD   furosemide (LASIX) 20 MG tablet Take 20 mg by mouth daily   Yes Historical Provider, MD   lisinopril (PRINIVIL;ZESTRIL) 40 MG tablet Take 40 mg by mouth daily   Yes Historical Provider, MD   bimatoprost (LUMIGAN) 0.01 % SOLN ophthalmic drops Place 1 drop into both eyes nightly   Yes Historical Provider, MD   omeprazole (PRILOSEC) 20 MG delayed release capsule Take 20 mg by mouth daily   Yes Historical Provider, MD   rOPINIRole (REQUIP) 2 MG tablet Take 2 mg by mouth nightly   Yes Historical Provider, MD   atorvastatin (LIPITOR) 40 MG tablet Take 40 mg by mouth nightly   Yes Historical Provider, MD   sertraline (ZOLOFT) 50 MG tablet Take 50 mg by mouth daily   Yes Historical Provider, MD   Empagliflozin-metFORMIN HCl (SYNJARDY) 5-1000 MG TABS Take 1,000 mg by mouth 2 times daily (before meals)   Yes Historical Provider, MD   Dulaglutide (TRULICITY) 1.5 CD/6.2II SOPN Inject 1.5 mg into the skin once a week   Yes Historical Provider, MD   albuterol sulfate HFA (PROVENTIL;VENTOLIN;PROAIR) 108 (90 Base) MCG/ACT inhaler Inhale 2 puffs into the lungs every 6 hours as needed for Wheezing   Yes Historical Provider, MD   diclofenac sodium (VOLTAREN) 1 % GEL Apply 2 g topically as needed for Pain   Yes Historical Provider, MD   docusate sodium (COLACE) 100 MG capsule Take 100 mg by mouth 2 times daily as needed for Constipation   Yes Historical Provider, MD   cyclobenzaprine (FLEXERIL) 10 MG tablet Take 10 mg by mouth 3 times daily as needed for Muscle spasms   Yes Historical Provider, MD   oxyCODONE (ROXICODONE) 5 MG immediate release tablet Take 5 mg by mouth every 4 hours as needed for Pain.    Yes Historical Provider, MD   rOPINIRole (REQUIP) 2 MG tablet  8/29/22  Yes Historical Provider, MD   sertraline (ZOLOFT) 50 MG tablet TAKE ONE TABLET BY MOUTH EVERY MORNING 9/21/22  Yes Royden Boast, MD   TRULICITY 1.5 VD/1.0YK Jorge Luong OF ONE SYRINGE UNDER THE SKIN ONCE WEEKLY  Patient taking differently: Inject 1.5 mg into the skin once a week Saturdays 9/21/22  Yes Royden Boast, MD   celecoxib (CELEBREX) 100 MG capsule TAKE 1 CAPSULE BY MOUTH 2 TIMES A DAY 9/8/22  Yes Ana Villar MD   SYNJARDY 5-1000 MG TABS TAKE 1 TABLET BY MOUTH 2 TIMES A DAY 8/30/22  Yes Royden Boast, MD   lisinopril (PRINIVIL;ZESTRIL) 40 MG tablet Take 1 tablet by mouth daily 8/25/22  Yes Kendrick Greene MD   busPIRone (BUSPAR) 10 MG tablet TAKE 1 TABLET BY MOUTH 3 TIMES A DAY 8/23/22  Yes Sangeeta Gonzalez MD   alendronate (FOSAMAX) 70 MG tablet Take 1 tablet by mouth every 7 days  Patient taking differently: Take 70 mg by mouth every 7 days Wednesdays 7/29/22  Yes Nidia Motta MD   oxyCODONE-acetaminophen (PERCOCET) 5-325 MG per tablet Take by mouth every 4 hours as needed for Pain. Yes Historical Provider, MD   atorvastatin (LIPITOR) 40 MG tablet Take 1 tablet by mouth at bedtime 7/15/22  Yes Anastacia Flood MD   furosemide (LASIX) 20 MG tablet Take 0.5 tablets by mouth every other day 7/15/22  Yes Jose Venegas MD   blood glucose test strips (PRODIGY NO CODING BLOOD GLUC) strip Use to test once daily and as needed as directed by provider. Please dispense Prodigy brand. 7/12/22  Yes Royden Boast, MD   blood glucose monitor kit and supplies Dispense sufficient amount for indicated testing frequency plus additional to accommodate PRN testing needs. Dispense all needed supplies to include: monitor, strips, lancing device, lancets, control solutions, alcohol swabs.    Prodigy Brand 7/6/22  Yes Royden Boast, MD   glipiZIDE (GLUCOTROL) 10 MG tablet Take 1 tab bid 1/13/22  Yes Royden Boast, MD   omeprazole (PRILOSEC) 20 MG delayed release capsule Take 1 capsule by mouth 2 times daily 7/26/21  Yes Royden Boast, MD   bimatoprost (LUMIGAN) 0.01 % SOLN ophthalmic drops Place 1 drop into both eyes nightly 4/26/18  Yes Historical Provider, MD   Blood Glucose Monitoring Suppl (PRODIGY AUTOCODE BLOOD GLUCOSE) w/Device KIT 1 Device by Does not apply route 3 times daily 4/26/19  Yes Yaa Robles MD   Elastic Bandages & Supports (ELBOW BRACE) [address] 1 each by Does not apply route at bedtime Please dispense left cubital tunnel brace 8/18/22 11/16/22  [employer], APRN - CNP   albuterol sulfate HFA (PROVENTIL HFA) 108 (90 Base) MCG/ACT inhaler Inhale 2 puffs into the lungs every 4 hours as needed for Wheezing 4/20/21 4/20/22  Yaa Robles MD   repaglinide (PRANDIN) 1 MG tablet TAKE 1 TABLET BY MOUTH 3 TIMES A DAY BEFORE MEALS 12/8/20   Yaa Robles MD       Current Medications:   No current facility-administered medications for this visit. PHYSICAL EXAM:       /79   Pulse 90   Ht 5' 3\" (1.6 m)   Wt 180 lb (81.6 kg)   SpO2 98%   BMI 31.89 kg/m²   Physical Exam     Gen: NAD  HEENT: moist mucus membranes  Cardio: RRR  Pulm: chest rise symmetrically  GI: abd soft  Ext: no edema  Skin: warm    Neuro:    AOX3  CN 2-12 grossly intact  Speech articulate  Intrinsic left 4/5.   4  No pronator drift  Sensation symmetrical       ASSESSMENT AND PLAN:       Patient Active Problem List   Diagnosis    Cervical spondylosis    Type 2 diabetes mellitus without complication, without long-term current use of insulin (HCC)    Hypertension    Abnormal auditory perception    Nasal polyps    Osteoarthritis of left knee    Osteoarthritis of right knee    DIEGO (nonalcoholic steatohepatitis)    Vitamin D deficiency    Iron deficiency anemia    Dyslipidemia    Trochanteric bursitis    Chronic left shoulder pain    Restless legs syndrome    Generalized anxiety disorder    Toxic metabolic encephalopathy    Class 2 obesity in adult    Leg edema    Recurrent major depressive disorder (HCC)    Dermatitis    Retrolisthesis of vertebrae    Bilateral carpal tunnel syndrome    Intention tremor Sciatica    Back pain with radiation    Left sciatic nerve pain    Lumbar disc disease with radiculopathy    Intractable back pain    Piriformis syndrome of left side    Ulnar neuropathy at elbow, left         A/P:  This is a 65 y.o. 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 There may be inaccuracies of transcription  that are inadvertently overlooked prior to the signature. There is any questions about the transcription please contact me.

## 2022-11-22 NOTE — TELEPHONE ENCOUNTER
[address] [facility],4Th Floor Employee Diabetes Program    Iliana Blackwell is a 65 y.o. female enrolled in the [place] with Diabetes Program. The goal of this voluntary program is to help employees and covered dependents reach their health maintenance goals in regards to their diabetes diagnosis. According to our records, patient is missing the following requirement(s) that must be completed by December 31, 2022 to avoid discharge from the program:    Lipid panel     Plan:    Attempt made to reach patient by telephone to review above. Spoke to patient - verbalized understanding. Patient is having surgery tomorrow on her elbow but states she will get that done. A1c did increase to 7.7 but patient states she has had to get steroids injections. She will be keeping a close watch on her sugars and diet.  also in our program. Wife asking and educated what he is missing as well.      Hi Kilpatrick, PharmD, y [address] Pharmacist  Department: [phone]     For Pharmacy Admin Tracking Only    Time Spent (min): 15

## 2022-11-23 ENCOUNTER — HOSPITAL ENCOUNTER (OUTPATIENT)
Age: 62
Setting detail: OUTPATIENT SURGERY
Discharge: HOME | End: 2022-11-23
Attending: NEUROLOGICAL SURGERY | Admitting: NEUROLOGICAL SURGERY
Payer: COMMERCIAL

## 2022-11-23 ENCOUNTER — CARE COORDINATION (OUTPATIENT)
Dept: OTHER | Facility: CLINIC | Age: 62
End: 2022-11-23

## 2022-11-23 ENCOUNTER — ANESTHESIA (OUTPATIENT)
Dept: OPERATING ROOM | Age: 62
End: 2022-11-23
Payer: COMMERCIAL

## 2022-11-23 VITALS
DIASTOLIC BLOOD PRESSURE: 84 MMHG | OXYGEN SATURATION: 93 % | RESPIRATION RATE: 14 BRPM | SYSTOLIC BLOOD PRESSURE: 128 MMHG | HEART RATE: 93 BPM | TEMPERATURE: 97 F

## 2022-11-23 DIAGNOSIS — Z98.890 S/P CARPAL TUNNEL RELEASE: ICD-10-CM

## 2022-11-23 DIAGNOSIS — G56.22 ULNAR NEUROPATHY AT ELBOW, LEFT: Primary | ICD-10-CM

## 2022-11-23 LAB
GLUCOSE BLD-MCNC: 110 MG/DL (ref 74–100)
GLUCOSE BLD-MCNC: 197 MG/DL (ref 65–105)
POC POTASSIUM: 3.8 MMOL/L (ref 3.5–4.5)

## 2022-11-23 PROCEDURE — 7100000041 HC SPAR PHASE II RECOVERY - ADDTL 15 MIN: Performed by: NEUROLOGICAL SURGERY

## 2022-11-23 PROCEDURE — 7100000000 HC PACU RECOVERY - FIRST 15 MIN: Performed by: NEUROLOGICAL SURGERY

## 2022-11-23 PROCEDURE — 7100000001 HC PACU RECOVERY - ADDTL 15 MIN: Performed by: NEUROLOGICAL SURGERY

## 2022-11-23 PROCEDURE — 3600000004 HC SURGERY LEVEL 4 BASE: Performed by: NEUROLOGICAL SURGERY

## 2022-11-23 PROCEDURE — 3700000001 HC ADD 15 MINUTES (ANESTHESIA): Performed by: NEUROLOGICAL SURGERY

## 2022-11-23 PROCEDURE — 2580000003 HC RX 258: Performed by: STUDENT IN AN ORGANIZED HEALTH CARE EDUCATION/TRAINING PROGRAM

## 2022-11-23 PROCEDURE — 6370000000 HC RX 637 (ALT 250 FOR IP): Performed by: STUDENT IN AN ORGANIZED HEALTH CARE EDUCATION/TRAINING PROGRAM

## 2022-11-23 PROCEDURE — 6360000002 HC RX W HCPCS: Performed by: STUDENT IN AN ORGANIZED HEALTH CARE EDUCATION/TRAINING PROGRAM

## 2022-11-23 PROCEDURE — 2580000003 HC RX 258: Performed by: NEUROLOGICAL SURGERY

## 2022-11-23 PROCEDURE — 7100000040 HC SPAR PHASE II RECOVERY - FIRST 15 MIN: Performed by: NEUROLOGICAL SURGERY

## 2022-11-23 PROCEDURE — 3700000000 HC ANESTHESIA ATTENDED CARE: Performed by: NEUROLOGICAL SURGERY

## 2022-11-23 PROCEDURE — 2500000003 HC RX 250 WO HCPCS: Performed by: NEUROLOGICAL SURGERY

## 2022-11-23 PROCEDURE — 2500000003 HC RX 250 WO HCPCS: Performed by: NURSE ANESTHETIST, CERTIFIED REGISTERED

## 2022-11-23 PROCEDURE — 6360000002 HC RX W HCPCS: Performed by: NURSE ANESTHETIST, CERTIFIED REGISTERED

## 2022-11-23 PROCEDURE — 82947 ASSAY GLUCOSE BLOOD QUANT: CPT

## 2022-11-23 PROCEDURE — 2709999900 HC NON-CHARGEABLE SUPPLY: Performed by: NEUROLOGICAL SURGERY

## 2022-11-23 PROCEDURE — 6370000000 HC RX 637 (ALT 250 FOR IP): Performed by: NEUROLOGICAL SURGERY

## 2022-11-23 PROCEDURE — 84132 ASSAY OF SERUM POTASSIUM: CPT

## 2022-11-23 PROCEDURE — 3600000014 HC SURGERY LEVEL 4 ADDTL 15MIN: Performed by: NEUROLOGICAL SURGERY

## 2022-11-23 RX ORDER — WOUND DRESSING ADHESIVE - LIQUID
LIQUID MISCELLANEOUS PRN
Status: DISCONTINUED | OUTPATIENT
Start: 2022-11-23 | End: 2022-11-23 | Stop reason: HOSPADM

## 2022-11-23 RX ORDER — HYDRALAZINE HYDROCHLORIDE 20 MG/ML
10 INJECTION INTRAMUSCULAR; INTRAVENOUS
Status: DISCONTINUED | OUTPATIENT
Start: 2022-11-23 | End: 2022-11-23 | Stop reason: HOSPADM

## 2022-11-23 RX ORDER — SODIUM CHLORIDE, SODIUM LACTATE, POTASSIUM CHLORIDE, CALCIUM CHLORIDE 600; 310; 30; 20 MG/100ML; MG/100ML; MG/100ML; MG/100ML
INJECTION, SOLUTION INTRAVENOUS CONTINUOUS
Status: DISCONTINUED | OUTPATIENT
Start: 2022-11-23 | End: 2022-11-23 | Stop reason: HOSPADM

## 2022-11-23 RX ORDER — CYCLOBENZAPRINE HCL 5 MG
5 TABLET ORAL 2 TIMES DAILY PRN
Qty: 10 TABLET | Refills: 0 | Status: SHIPPED | OUTPATIENT
Start: 2022-11-23 | End: 2022-11-23 | Stop reason: SINTOL

## 2022-11-23 RX ORDER — ONDANSETRON 2 MG/ML
INJECTION INTRAMUSCULAR; INTRAVENOUS PRN
Status: DISCONTINUED | OUTPATIENT
Start: 2022-11-23 | End: 2022-11-23 | Stop reason: SDUPTHER

## 2022-11-23 RX ORDER — METHOCARBAMOL 500 MG/1
500 TABLET, FILM COATED ORAL 3 TIMES DAILY PRN
Status: DISCONTINUED | OUTPATIENT
Start: 2022-11-23 | End: 2022-11-23

## 2022-11-23 RX ORDER — SODIUM CHLORIDE 9 MG/ML
INJECTION, SOLUTION INTRAVENOUS PRN
Status: DISCONTINUED | OUTPATIENT
Start: 2022-11-23 | End: 2022-11-23 | Stop reason: HOSPADM

## 2022-11-23 RX ORDER — BUPIVACAINE HYDROCHLORIDE 5 MG/ML
INJECTION, SOLUTION PERINEURAL PRN
Status: DISCONTINUED | OUTPATIENT
Start: 2022-11-23 | End: 2022-11-23 | Stop reason: HOSPADM

## 2022-11-23 RX ORDER — PROPOFOL 10 MG/ML
INJECTION, EMULSION INTRAVENOUS PRN
Status: DISCONTINUED | OUTPATIENT
Start: 2022-11-23 | End: 2022-11-23 | Stop reason: SDUPTHER

## 2022-11-23 RX ORDER — DEXAMETHASONE SODIUM PHOSPHATE 10 MG/ML
INJECTION INTRAMUSCULAR; INTRAVENOUS PRN
Status: DISCONTINUED | OUTPATIENT
Start: 2022-11-23 | End: 2022-11-23 | Stop reason: SDUPTHER

## 2022-11-23 RX ORDER — LIDOCAINE HYDROCHLORIDE 10 MG/ML
INJECTION, SOLUTION EPIDURAL; INFILTRATION; INTRACAUDAL; PERINEURAL PRN
Status: DISCONTINUED | OUTPATIENT
Start: 2022-11-23 | End: 2022-11-23 | Stop reason: SDUPTHER

## 2022-11-23 RX ORDER — OXYCODONE HYDROCHLORIDE AND ACETAMINOPHEN 5; 325 MG/1; MG/1
1 TABLET ORAL ONCE
Status: COMPLETED | OUTPATIENT
Start: 2022-11-23 | End: 2022-11-23

## 2022-11-23 RX ORDER — FENTANYL CITRATE 50 UG/ML
INJECTION, SOLUTION INTRAMUSCULAR; INTRAVENOUS PRN
Status: DISCONTINUED | OUTPATIENT
Start: 2022-11-23 | End: 2022-11-23 | Stop reason: SDUPTHER

## 2022-11-23 RX ORDER — ONDANSETRON 2 MG/ML
4 INJECTION INTRAMUSCULAR; INTRAVENOUS
Status: COMPLETED | OUTPATIENT
Start: 2022-11-23 | End: 2022-11-23

## 2022-11-23 RX ORDER — SODIUM CHLORIDE 0.9 % (FLUSH) 0.9 %
5-40 SYRINGE (ML) INJECTION PRN
Status: DISCONTINUED | OUTPATIENT
Start: 2022-11-23 | End: 2022-11-23 | Stop reason: HOSPADM

## 2022-11-23 RX ORDER — EPHEDRINE SULFATE/0.9% NACL/PF 50 MG/5 ML
SYRINGE (ML) INTRAVENOUS PRN
Status: DISCONTINUED | OUTPATIENT
Start: 2022-11-23 | End: 2022-11-23 | Stop reason: SDUPTHER

## 2022-11-23 RX ORDER — MIDAZOLAM HYDROCHLORIDE 1 MG/ML
INJECTION INTRAMUSCULAR; INTRAVENOUS PRN
Status: DISCONTINUED | OUTPATIENT
Start: 2022-11-23 | End: 2022-11-23 | Stop reason: SDUPTHER

## 2022-11-23 RX ORDER — OXYCODONE HYDROCHLORIDE AND ACETAMINOPHEN 5; 325 MG/1; MG/1
1 TABLET ORAL EVERY 8 HOURS PRN
Qty: 21 TABLET | Refills: 0 | Status: SHIPPED | OUTPATIENT
Start: 2022-11-23 | End: 2022-11-30

## 2022-11-23 RX ORDER — MAGNESIUM HYDROXIDE 1200 MG/15ML
LIQUID ORAL CONTINUOUS PRN
Status: DISCONTINUED | OUTPATIENT
Start: 2022-11-23 | End: 2022-11-23 | Stop reason: HOSPADM

## 2022-11-23 RX ORDER — METHOCARBAMOL 750 MG/1
750 TABLET, FILM COATED ORAL 3 TIMES DAILY PRN
Qty: 15 TABLET | Refills: 0 | Status: SHIPPED | OUTPATIENT
Start: 2022-11-23 | End: 2022-11-28

## 2022-11-23 RX ORDER — SODIUM CHLORIDE 0.9 % (FLUSH) 0.9 %
5-40 SYRINGE (ML) INJECTION EVERY 12 HOURS SCHEDULED
Status: DISCONTINUED | OUTPATIENT
Start: 2022-11-23 | End: 2022-11-23 | Stop reason: HOSPADM

## 2022-11-23 RX ORDER — CEPHALEXIN 500 MG/1
500 CAPSULE ORAL EVERY 8 HOURS
Qty: 3 CAPSULE | Refills: 0 | Status: SHIPPED | OUTPATIENT
Start: 2022-11-23 | End: 2022-11-24

## 2022-11-23 RX ORDER — LIDOCAINE HYDROCHLORIDE AND EPINEPHRINE 10; 10 MG/ML; UG/ML
INJECTION, SOLUTION INFILTRATION; PERINEURAL PRN
Status: DISCONTINUED | OUTPATIENT
Start: 2022-11-23 | End: 2022-11-23 | Stop reason: HOSPADM

## 2022-11-23 RX ADMIN — LIDOCAINE HYDROCHLORIDE 50 MG: 10 INJECTION, SOLUTION EPIDURAL; INFILTRATION; INTRACAUDAL; PERINEURAL at 09:32

## 2022-11-23 RX ADMIN — PROPOFOL 130 MG: 10 INJECTION, EMULSION INTRAVENOUS at 09:32

## 2022-11-23 RX ADMIN — Medication 10 MG: at 10:50

## 2022-11-23 RX ADMIN — Medication 10 MG: at 10:06

## 2022-11-23 RX ADMIN — FENTANYL CITRATE 50 MCG: 50 INJECTION, SOLUTION INTRAMUSCULAR; INTRAVENOUS at 10:02

## 2022-11-23 RX ADMIN — ONDANSETRON 4 MG: 2 INJECTION INTRAMUSCULAR; INTRAVENOUS at 12:13

## 2022-11-23 RX ADMIN — Medication 10 MG: at 09:57

## 2022-11-23 RX ADMIN — OXYCODONE HYDROCHLORIDE AND ACETAMINOPHEN 1 TABLET: 5; 325 TABLET ORAL at 13:55

## 2022-11-23 RX ADMIN — Medication 10 MG: at 09:41

## 2022-11-23 RX ADMIN — Medication 2 G: at 09:57

## 2022-11-23 RX ADMIN — MIDAZOLAM 2 MG: 1 INJECTION INTRAMUSCULAR; INTRAVENOUS at 09:26

## 2022-11-23 RX ADMIN — FENTANYL CITRATE 50 MCG: 50 INJECTION, SOLUTION INTRAMUSCULAR; INTRAVENOUS at 11:52

## 2022-11-23 RX ADMIN — DEXAMETHASONE SODIUM PHOSPHATE 4 MG: 10 INJECTION INTRAMUSCULAR; INTRAVENOUS at 09:55

## 2022-11-23 RX ADMIN — HYDROMORPHONE HYDROCHLORIDE 0.5 MG: 1 INJECTION, SOLUTION INTRAMUSCULAR; INTRAVENOUS; SUBCUTANEOUS at 12:41

## 2022-11-23 RX ADMIN — SODIUM CHLORIDE, POTASSIUM CHLORIDE, SODIUM LACTATE AND CALCIUM CHLORIDE: 600; 310; 30; 20 INJECTION, SOLUTION INTRAVENOUS at 08:30

## 2022-11-23 RX ADMIN — FENTANYL CITRATE 50 MCG: 50 INJECTION, SOLUTION INTRAMUSCULAR; INTRAVENOUS at 10:41

## 2022-11-23 RX ADMIN — HYDROMORPHONE HYDROCHLORIDE 0.5 MG: 1 INJECTION, SOLUTION INTRAMUSCULAR; INTRAVENOUS; SUBCUTANEOUS at 13:01

## 2022-11-23 RX ADMIN — FENTANYL CITRATE 50 MCG: 50 INJECTION, SOLUTION INTRAMUSCULAR; INTRAVENOUS at 09:32

## 2022-11-23 RX ADMIN — SODIUM CHLORIDE, POTASSIUM CHLORIDE, SODIUM LACTATE AND CALCIUM CHLORIDE: 600; 310; 30; 20 INJECTION, SOLUTION INTRAVENOUS at 11:44

## 2022-11-23 RX ADMIN — ONDANSETRON 4 MG: 2 INJECTION INTRAMUSCULAR; INTRAVENOUS at 11:27

## 2022-11-23 ASSESSMENT — PAIN SCALES - GENERAL
PAINLEVEL_OUTOF10: 0
PAINLEVEL_OUTOF10: 7
PAINLEVEL_OUTOF10: 0
PAINLEVEL_OUTOF10: 7
PAINLEVEL_OUTOF10: 0
PAINLEVEL_OUTOF10: 5

## 2022-11-23 ASSESSMENT — PAIN DESCRIPTION - LOCATION: LOCATION: ELBOW

## 2022-11-23 ASSESSMENT — PAIN SCALES - WONG BAKER: WONGBAKER_NUMERICALRESPONSE: 2

## 2022-11-23 ASSESSMENT — PAIN DESCRIPTION - ORIENTATION: ORIENTATION: RIGHT

## 2022-11-23 ASSESSMENT — PAIN - FUNCTIONAL ASSESSMENT: PAIN_FUNCTIONAL_ASSESSMENT: 0-10

## 2022-11-23 ASSESSMENT — PAIN DESCRIPTION - DESCRIPTORS: DESCRIPTORS: ACHING

## 2022-11-23 NOTE — DISCHARGE INSTRUCTIONS
Wound care:    Keep sling on for 5 days, keep dressing on for 5 days. Can use the arm normally after    Can shower if dressing/wound is covered    Call office if there is increased pain, swelling, redness, drainage, fevers. Pain and swelling is expected 1st 2-4 days and should subside after    Can resume OT after evaluation after 1st visit. No alcoholic beverages, no driving or operating machinery, no making important decisions for 24 hours. You may have a normal diet but should eat lightly day of surgery. Drink plenty of fluids. Urinate within 8 hours after surgery, if unable to urinate call your doctor Call your doctor for the following:   Chills   Temperature greater than 101   Pain that is not tolerable despite taking pain medicine as ordered   There is increased swelling, redness or warmth at surgical site   There is increased drainage or bleeding from surgical site   Do not remove surgical dressing unless instructed to do so by your surgeon            No alcoholic beverages, no driving or operating machinery, no making important decisions for 24 hours. Children should maintain quiet play ( games, movies, books ) for 24 hours. You may have a normal diet but should eat lightly day of surgery. Drink plenty of fluids.   Urinate within 8 hours after surgery, if unable to urinate call your doctor

## 2022-11-23 NOTE — OP NOTE
Operative Note      Patient: Martha Leon  YOB: 1960  MRN: [id number]    Date of Procedure: 11/23/2022    Pre-Op Diagnosis: CUBITAL TUNNEL SYNDROME, left    Post-Op Diagnosis: Same       Procedure(s):  CUBITAL TUNNEL DECOMPRESSION, left(neurolysis of ulnar nerve)  Neurolysis of medial antebrachial cutaneous never  Surgeon(s):  Meena Cuevas DO    Assistant:   * No surgical staff found *    Anesthesia: General    Estimated Blood Loss (mL): Minimal    Complications: None    Specimens:   * No specimens in log *    Implants:  * No implants in log *      Drains: * No LDAs found *    Findings:     Indication for surgery: cubital tunnel syndrome, failure of conservative management with hand weakness and numbness. Detailed Description of Procedure: Incision was marked centered at the medial epicondyle and posterior to it approximately 12 centimeter long. The incision was infiltrated with 1% lidocaine with epinephrine. Skin was incised with a 10 blade down to the subcutaneous fat. Hemostasis was obtained with gentle bipolar cautery. Dissection was then done with blunt spreading of Metzenbaums or mosquito in order to avoid any injury to the medial antebrachial cutaneous nerve. This is was dissected and protected. Immediately posterior to the medial epicondyle, the cubital tunnel retinaculum was identified and dissected and divided to expose the underlying ulnar nerve. This was followed proximally, and the fascia was divided, was followed proximally onto and arcade of staceyers was divided to about 10 cm proximal to elbow. The elbow with was  flexed and extended to test for residual point of compression by direct palpation. The distal attachment to the medial intermuscular septum was divided to further give more freedom ti the ulnar never. Distal to Cagle's ligament the flexor carpi ulnaris aponeurosis was divided. I followed this distally and opened the fascia covering the FCU as well. I then flexed and extended the patient's elbow and saw no subluxation of the nerve. The wound was thoroughly irrigated to ensure no active bleeding. The wound then closed in standard fashion and skin was dressed with Dermabond  Patient was wrapped with Kerlix and Ace wrap as well as a sling.       Electronically signed by Maye Ramirez DO on 11/23/2022 at 11:48 AM

## 2022-11-23 NOTE — CARE COORDINATION
ACM reviewed chart. Patient having cubital tunnel release surgery today. Will call after discharge.     Tory Goel RN  Associate Care Manager  Cell: [phone]

## 2022-11-23 NOTE — ANESTHESIA PRE PROCEDURE
Department of Anesthesiology  Preprocedure Note       Name:  Evelyn Claros   Age:  65 y.o.  :  1960                                          MRN:  [id number]         Date:  2022      Surgeon: Jermain De La Rosa):  Mague Arteaga DO    Procedure: Procedure(s):  CUBITAL TUNNEL RELEASE    Medications prior to admission:   Prior to Admission medications    Medication Sig Start Date End Date Taking? Authorizing Provider   methocarbamol (ROBAXIN) 750 MG tablet  22   Historical Provider, MD   Prodigy Lancets 28G MISC 1 Bottle by Does not apply route three times daily 22   Jeronimo Pena DO   magnesium oxide (MAG-OX) 400 MG tablet Take 1 tablet by mouth daily 11/10/22   Mo'Rosanne Monson MD   gabapentin (NEURONTIN) 400 MG capsule Take 1 capsule by mouth 3 times daily for 30 days. 22  Mo'Rosanne Monson MD   ASPIRIN LOW DOSE 81 MG EC tablet TAKE 1 TABLET BY MOUTH ONE TIME A DAY 22   Peng Lowry MD   diazePAM (VALIUM) 2 MG tablet Take 1 tablet by mouth every 6 hours as needed for Anxiety for up to 20 doses. 10/25/22 11/30/22  Alanis Quintero MD   oxyCODONE (ROXICODONE) 5 MG immediate release tablet Take 2 tablets by mouth every 4 hours as needed for Pain for up to 30 days. 10/25/22 11/24/22  Alanis Quintero MD   busPIRone (BUSPAR) 10 MG tablet Take 10 mg by mouth in the morning and 10 mg in the evening.     Historical Provider, MD   celecoxib (CELEBREX) 100 MG capsule Take 100 mg by mouth 2 times daily    Historical Provider, MD   glipiZIDE (GLUCOTROL) 10 MG tablet Take 10 mg by mouth 2 times daily (before meals)    Historical Provider, MD   furosemide (LASIX) 20 MG tablet Take 20 mg by mouth daily    Historical Provider, MD   lisinopril (PRINIVIL;ZESTRIL) 40 MG tablet Take 40 mg by mouth daily    Historical Provider, MD   bimatoprost (LUMIGAN) 0.01 % SOLN ophthalmic drops Place 1 drop into both eyes nightly    Historical Provider, MD   omeprazole (PRILOSEC) 20 MG delayed release capsule Take 20 mg by mouth daily    Historical Provider, MD   rOPINIRole (REQUIP) 2 MG tablet Take 2 mg by mouth nightly    Historical Provider, MD   atorvastatin (LIPITOR) 40 MG tablet Take 40 mg by mouth nightly    Historical Provider, MD   sertraline (ZOLOFT) 50 MG tablet Take 50 mg by mouth daily    Historical Provider, MD   Empagliflozin-metFORMIN HCl (SYNJARDY) 5-1000 MG TABS Take 1,000 mg by mouth 2 times daily (before meals)    Historical Provider, MD   Dulaglutide (TRULICITY) 1.5 EP/8.9ND SOPN Inject 1.5 mg into the skin once a week    Historical Provider, MD   albuterol sulfate HFA (PROVENTIL;VENTOLIN;PROAIR) 108 (90 Base) MCG/ACT inhaler Inhale 2 puffs into the lungs every 6 hours as needed for Wheezing    Historical Provider, MD   diclofenac sodium (VOLTAREN) 1 % GEL Apply 2 g topically as needed for Pain    Historical Provider, MD   docusate sodium (COLACE) 100 MG capsule Take 100 mg by mouth 2 times daily as needed for Constipation    Historical Provider, MD   cyclobenzaprine (FLEXERIL) 10 MG tablet Take 10 mg by mouth 3 times daily as needed for Muscle spasms    Historical Provider, MD   oxyCODONE (ROXICODONE) 5 MG immediate release tablet Take 5 mg by mouth every 4 hours as needed for Pain.     Historical Provider, MD   rOPINIRole (REQUIP) 2 MG tablet  8/29/22   Historical Provider, MD   sertraline (ZOLOFT) 50 MG tablet TAKE ONE TABLET BY MOUTH EVERY MORNING 9/21/22   MD MAHESH DesaiULICITY 1.5 RY/4.9YJ SOPN INJECT THE CONTENTS OF ONE SYRINGE UNDER THE SKIN ONCE WEEKLY  Patient taking differently: Inject 1.5 mg into the skin once a week Saturdays 9/21/22   Nhan Bell MD   celecoxib (CELEBREX) 100 MG capsule TAKE 1 CAPSULE BY MOUTH 2 TIMES A DAY 9/8/22   Tin Morris MD   SYNJARDY 5-1000 MG TABS TAKE 1 TABLET BY MOUTH 2 TIMES A DAY 8/30/22   Nhan Bell MD   lisinopril (PRINIVIL;ZESTRIL) 40 MG tablet Take 1 tablet by mouth daily 8/25/22   Liam Peraza MD   busPIRone (BUSPAR) 10 MG tablet TAKE 1 TABLET BY MOUTH 3 TIMES A DAY 8/23/22   Sangeeta Gonzalez MD   Elastic Bandages & Supports (ELBOW BRACE) MISC 1 each by Does not apply route at bedtime Please dispense left cubital tunnel brace 8/18/22 11/16/22  Brayan Reese, APRN - CNP   alendronate (FOSAMAX) 70 MG tablet Take 1 tablet by mouth every 7 days  Patient taking differently: Take 70 mg by mouth every 7 days Wednesdays 7/29/22   Keyur Joyce MD   oxyCODONE-acetaminophen (PERCOCET) 5-325 MG per tablet Take by mouth every 4 hours as needed for Pain. Historical Provider, MD   atorvastatin (LIPITOR) 40 MG tablet Take 1 tablet by mouth at bedtime 7/15/22   Rosi Rizzo MD   furosemide (LASIX) 20 MG tablet Take 0.5 tablets by mouth every other day 7/15/22   Rosi Rizzo MD   blood glucose test strips (PRODIGY NO CODING BLOOD GLUC) strip Use to test once daily and as needed as directed by provider. Please dispense Prodigy brand. 7/12/22   Ruby Hannah MD   blood glucose monitor kit and supplies Dispense sufficient amount for indicated testing frequency plus additional to accommodate PRN testing needs. Dispense all needed supplies to include: monitor, strips, lancing device, lancets, control solutions, alcohol swabs.    Prodigy Brand 7/6/22   Ruby Hannah MD   glipiZIDE (GLUCOTROL) 10 MG tablet Take 1 tab bid 1/13/22   Ruby Hannah MD   omeprazole (PRILOSEC) 20 MG delayed release capsule Take 1 capsule by mouth 2 times daily 7/26/21   Ruby Hannah MD   albuterol sulfate HFA (PROVENTIL HFA) 108 (90 Base) MCG/ACT inhaler Inhale 2 puffs into the lungs every 4 hours as needed for Wheezing 4/20/21 4/20/22  Ruby Hannah MD   repaglinide (PRANDIN) 1 MG tablet TAKE 1 TABLET BY MOUTH 3 TIMES A DAY BEFORE MEALS 12/8/20   Ruby Hannah MD   bimatoprost (LUMIGAN) 0.01 % SOLN ophthalmic drops Place 1 drop into both eyes nightly 4/26/18   Historical Provider, MD   Blood Glucose Monitoring Suppl (PRODIGY AUTOCODE BLOOD GLUCOSE) w/Device KIT 1 Device by Does not apply route 3 times daily 4/26/19   Estela Tellez MD       Current medications:    No current outpatient medications on file. No current facility-administered medications for this visit. Allergies:     Allergies   Allergen Reactions    Codeine Nausea Only       Problem List:    Patient Active Problem List   Diagnosis Code    Cervical spondylosis M47.812    Type 2 diabetes mellitus without complication, without long-term current use of insulin (HCC) E11.9    Hypertension I10    Abnormal auditory perception H93.299    Nasal polyps J33.9    Osteoarthritis of left knee M17.12    Osteoarthritis of right knee M17.11    DIEGO (nonalcoholic steatohepatitis) K75.81    Vitamin D deficiency E55.9    Iron deficiency anemia D50.9    Dyslipidemia E78.5    Trochanteric bursitis M70.60    Chronic left shoulder pain M25.512, G89.29    Restless legs syndrome G25.81    Generalized anxiety disorder F41.1    Toxic metabolic encephalopathy R06.6    Class 2 obesity in adult E66.9    Leg edema R60.0    Recurrent major depressive disorder (HCC) F33.9    Dermatitis L30.9    Retrolisthesis of vertebrae M43.10    Bilateral carpal tunnel syndrome G56.03    Intention tremor G25.2    Sciatica M54.30    Back pain with radiation M54.9    Left sciatic nerve pain M54.32    Lumbar disc disease with radiculopathy M51.16    Intractable back pain M54.9    Piriformis syndrome of left side G57.02    Ulnar neuropathy at elbow, 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 V's    History of swelling of feet     lasix QOD for this    Hyperlipidemia     Hypertension     Left hand weakness     and pain, can not make fist d/t cubital tunnel issue    Lumbar radiculopathy 11/10/2021    was following with pain management and has had epidural injections    DIEGO (nonalcoholic steatohepatitis) 10/12/2014    Obesity     Osteoarthritis of left knee 2014    Recurrent major depressive disorder ([facility] [address] 07/15/2022    Restless legs syndrome     Snores 2022    was seen by Dr. Laine Arnold for likely sleep apnea while hospitalized , to follow up as OP for sleep study, has not been done    TIA (transient ischemic attack)     no residual symptoms    Type II or unspecified type diabetes mellitus without mention of complication, not stated as uncontrolled     Wears glasses     Wellness examination     PCP, Dr. Prashant Nash, last seen       Past Surgical History:        Procedure Laterality Date     SECTION      x2    COLONOSCOPY  2012    ? polyps per pt    HYSTERECTOMY, TOTAL ABDOMINAL (CERVIX REMOVED)      with BSO    SHOULDER SURGERY Left 2021    SHOULDER MANIPULATION WITH PRE OP INTERSCALENE BLOCK and intraop injection performed by Sherolyn Lesches, DO at [place] [id number] Left 10/03/2022    S1 nerve root injection, Dr. Tianna Silverman Left 10/06/2022    S1 NERVE ROOT INJECTION performed by Wiliam Delaney MD at [address] [place] [address]      as child    UPPER GASTROINTESTINAL ENDOSCOPY  2012    [facility]       Social History:    Social History     Tobacco Use    Smoking status: Never    Smokeless tobacco: Never   Substance Use Topics    Alcohol use: Yes     Alcohol/week: 0.0 standard drinks     Comment: rarely/ 4 times a year                                Counseling given: Not Answered      Vital Signs (Current): There were no vitals filed for this visit.                                            BP Readings from Last 3 Encounters: 11/22/22 124/79   11/09/22 102/71   11/08/22 132/84       NPO Status:                                                                                 BMI:   Wt Readings from Last 3 Encounters:   11/22/22 180 lb (81.6 kg)   11/09/22 180 lb (81.6 kg)   11/08/22 180 lb 6.4 oz (81.8 kg)     There is no height or weight on file to calculate BMI.    CBC:   Lab Results   Component Value Date/Time    WBC 13.3 10/14/2022 07:54 PM    RBC 4.91 10/14/2022 07:54 PM    HGB 13.5 10/14/2022 07:54 PM    HCT 42.1 10/14/2022 07:54 PM    MCV 85.7 10/14/2022 07:54 PM    RDW 14.2 10/14/2022 07:54 PM     10/14/2022 07:54 PM       CMP:   Lab Results   Component Value Date/Time     10/19/2022 04:55 PM    K 4.6 10/19/2022 04:55 PM     10/19/2022 04:55 PM    CO2 22 10/19/2022 04:55 PM    BUN 33 10/19/2022 04:55 PM    CREATININE 1.08 10/19/2022 04:55 PM    GFRAA >60 07/06/2022 03:40 PM    LABGLOM 58 10/19/2022 04:55 PM    GLUCOSE 159 10/19/2022 04:55 PM    GLUCOSE 137 05/19/2012 12:05 PM    PROT 7.0 10/04/2022 12:52 PM    CALCIUM 8.9 10/19/2022 04:55 PM    BILITOT 0.3 10/04/2022 12:52 PM    ALKPHOS 126 10/04/2022 12:52 PM    AST 35 10/04/2022 12:52 PM    ALT 58 10/04/2022 12:52 PM       POC Tests:   No results for input(s): POCGLU, POCNA, POCK, POCCL, POCBUN, POCHEMO, POCHCT in the last 72 hours.     Coags:   Lab Results   Component Value Date/Time    PROTIME 10.2 10/14/2022 07:54 PM    INR 0.9 10/14/2022 07:54 PM    APTT 20.2 10/14/2022 07:54 PM       HCG (If Applicable): No results found for: PREGTESTUR, PREGSERUM, HCG, HCGQUANT     ABGs: No results found for: PHART, PO2ART, YPX8NPW, DKZ1JIC, BEART, Q8PENRQQ     Type & Screen (If Applicable):  No results found for: LABABO, LABRH    Drug/Infectious Status (If Applicable):  Lab Results   Component Value Date/Time    HEPCAB NONREACTIVE 01/15/2014 09:54 AM       COVID-19 Screening (If Applicable):   Lab Results   Component Value Date/Time    COVID19 Not Detected 10/14/2022 07:55 PM    COVID19 DETECTED 12/06/2021 08:20 AM    COVID19 Not Detected 12/16/2020 07:45 PM         Anesthesia Evaluation  Patient summary reviewed and Nursing notes reviewed no history of anesthetic complications:   Airway: Mallampati: II  TM distance: >3 FB   Neck ROM: limited  Mouth opening: > = 3 FB   Dental: normal exam         Pulmonary:Negative Pulmonary ROS and normal exam                               Cardiovascular:  Exercise tolerance: poor (<4 METS),   (+) hypertension:, hyperlipidemia      ECG reviewed      Echocardiogram reviewed               ROS comment: -EKG - SR @ 75     Neuro/Psych:   (+) neuromuscular disease:, TIA, psychiatric history:depression/anxiety              ROS comment: -TIA - 10 YEARS AGO- no residual symptoms  -DDD - CERVICAL, POOR NECK MOBILITY GI/Hepatic/Renal:   (+) GERD:, liver disease:, morbid obesity         ROS comment: -NON-ALCOHOLIC STEATOHEPATITIS. Endo/Other:    (+) DiabetesType II DM, , : arthritis:., .                  ROS comment: -NPO AFTER MIDNIGHT  -ALLERGIES - CODEINE Abdominal:             Vascular: negative vascular ROS. Other Findings:             Anesthesia Plan      general     ASA 3       Induction: intravenous. MIPS: Postoperative opioids intended and Prophylactic antiemetics administered. Anesthetic plan and risks discussed with patient. Plan discussed with CRNA.                     Gasper Moreno MD   11/23/2022

## 2022-11-23 NOTE — BRIEF OP NOTE
Brief Postoperative Note      Patient: Caridad King  YOB: 1960  MRN: [id number]    Date of Procedure: 11/23/2022    Pre-Op Diagnosis: CUBITAL TUNNEL SYNDROME, left     Post-Op Diagnosis: Same       Procedure(s):  CUBITAL TUNNEL DECOMPRESSION, left    Surgeon(s):  Chance Julian DO    Assistant:  * No surgical staff found *    Anesthesia: General    Estimated Blood Loss (mL): Minimal    Complications: None    Specimens:   * No specimens in log *    Implants:  * No implants in log *      Drains: * No LDAs found *    Findings: successful decompression    Electronically signed by Chance Julian DO on 11/23/2022 at 11:47 AM

## 2022-11-23 NOTE — ANESTHESIA POSTPROCEDURE EVALUATION
Department of Anesthesiology  Postprocedure Note    Patient: Gill Diana  MRN: [id number]  YOB: 1960  Date of evaluation: 11/23/2022      Procedure Summary     Date: 11/23/22 Room / Location: [address]    Anesthesia Start: 7741 Anesthesia Stop: 7504    Procedure: CUBITAL TUNNEL DECOMPRESSION (Left: Arm Upper) Diagnosis:       Cubital tunnel syndrome on left      (CUBITAL TUNNEL SYNDROME)    Surgeons: Vineet Crain DO Responsible Provider: Marva Gibson MD    Anesthesia Type: general ASA Status: 3          Anesthesia Type: No value filed.     Elise Phase I: Elise Score: 8    Elise Phase II:        Anesthesia Post Evaluation    Patient location during evaluation: bedside  Patient participation: complete - patient participated  Level of consciousness: awake  Airway patency: patent  Nausea & Vomiting: no nausea and no vomiting  Complications: no  Cardiovascular status: blood pressure returned to baseline  Respiratory status: acceptable  Hydration status: euvolemic  Comments: /71   Pulse 89   Temp 96.8 °F (36 °C)   Resp 13   SpO2 97%

## 2022-11-23 NOTE — H&P
History and Physical    Pt Name: Debi Jordan  MRN: [id number]  YOB: 1960  Date of evaluation: 11/23/2022  Primary Care Physician: Blayne Elizalde MD    SUBJECTIVE:   History of Chief Complaint:    Debi Jordan is a 65 y.o. female who is scheduled today for CUBITAL TUNNEL RELEASE - Left. Patient reports history of left hand and arm pain with numbness to left fingers/hand since June of this year. Patient states her right fingers are also numb at times but denies any pain. Patient state she has tried a brace but this doesn't fit well and does not feel it helps much. Allergies  is allergic to codeine. Medications  Prior to Admission medications    Medication Sig Start Date End Date Taking? Authorizing Provider   methocarbamol (ROBAXIN) 750 MG tablet  11/8/22   Historical Provider, MD   Prodigy Lancets 28G MISC 1 Bottle by Does not apply route three times daily 11/20/22   Jhonatan Race, DO   magnesium oxide (MAG-OX) 400 MG tablet Take 1 tablet by mouth daily 11/10/22   Mo'Rosanne Sifuentes MD   gabapentin (NEURONTIN) 400 MG capsule Take 1 capsule by mouth 3 times daily for 30 days. 11/9/22 12/9/22  Mo'Rosanne Sifuentes MD   ASPIRIN LOW DOSE 81 MG EC tablet TAKE 1 TABLET BY MOUTH ONE TIME A DAY 11/8/22   Reina Arreola MD   diazePAM (VALIUM) 2 MG tablet Take 1 tablet by mouth every 6 hours as needed for Anxiety for up to 20 doses. 10/25/22 11/30/22  Humble Peters MD   oxyCODONE (ROXICODONE) 5 MG immediate release tablet Take 2 tablets by mouth every 4 hours as needed for Pain for up to 30 days. 10/25/22 11/24/22  Humble Peters MD   busPIRone (BUSPAR) 10 MG tablet Take 10 mg by mouth in the morning and 10 mg in the evening.     Historical Provider, MD   celecoxib (CELEBREX) 100 MG capsule Take 100 mg by mouth 2 times daily    Historical Provider, MD   glipiZIDE (GLUCOTROL) 10 MG tablet Take 10 mg by mouth 2 times daily (before meals)    Historical Provider, MD   furosemide (LASIX) 20 MG tablet Take 20 mg by mouth daily    Historical Provider, MD   lisinopril (PRINIVIL;ZESTRIL) 40 MG tablet Take 40 mg by mouth daily    Historical Provider, MD   bimatoprost (LUMIGAN) 0.01 % SOLN ophthalmic drops Place 1 drop into both eyes nightly    Historical Provider, MD   omeprazole (PRILOSEC) 20 MG delayed release capsule Take 20 mg by mouth daily    Historical Provider, MD   rOPINIRole (REQUIP) 2 MG tablet Take 2 mg by mouth nightly    Historical Provider, MD   atorvastatin (LIPITOR) 40 MG tablet Take 40 mg by mouth nightly    Historical Provider, MD   sertraline (ZOLOFT) 50 MG tablet Take 50 mg by mouth daily    Historical Provider, MD   Empagliflozin-metFORMIN HCl (SYNJARDY) 5-1000 MG TABS Take 1,000 mg by mouth 2 times daily (before meals)    Historical Provider, MD   Dulaglutide (TRULICITY) 1.5 LT/2.4RG SOPN Inject 1.5 mg into the skin once a week    Historical Provider, MD   albuterol sulfate HFA (PROVENTIL;VENTOLIN;PROAIR) 108 (90 Base) MCG/ACT inhaler Inhale 2 puffs into the lungs every 6 hours as needed for Wheezing    Historical Provider, MD   diclofenac sodium (VOLTAREN) 1 % GEL Apply 2 g topically as needed for Pain    Historical Provider, MD   docusate sodium (COLACE) 100 MG capsule Take 100 mg by mouth 2 times daily as needed for Constipation    Historical Provider, MD   cyclobenzaprine (FLEXERIL) 10 MG tablet Take 10 mg by mouth 3 times daily as needed for Muscle spasms    Historical Provider, MD   oxyCODONE (ROXICODONE) 5 MG immediate release tablet Take 5 mg by mouth every 4 hours as needed for Pain.     Historical Provider, MD   rOPINIRole (REQUIP) 2 MG tablet  8/29/22   Historical Provider, MD   sertraline (ZOLOFT) 50 MG tablet TAKE ONE TABLET BY MOUTH EVERY MORNING 9/21/22   MD CHANDRA Richards 1.5 JY/9.5WC SOPN INJECT THE CONTENTS OF ONE SYRINGE UNDER THE SKIN ONCE WEEKLY  Patient taking differently: Inject 1.5 mg into the skin once a week Saturdays 9/21/22   Temi Beasley MD celecoxib (CELEBREX) 100 MG capsule TAKE 1 CAPSULE BY MOUTH 2 TIMES A DAY 9/8/22   Tashi Cantrell MD   SYNJARDY 5-1000 MG TABS TAKE 1 TABLET BY MOUTH 2 TIMES A DAY 8/30/22   Gloria Childers MD   lisinopril (PRINIVIL;ZESTRIL) 40 MG tablet Take 1 tablet by mouth daily 8/25/22   Kari Small MD   busPIRone (BUSPAR) 10 MG tablet TAKE 1 TABLET BY MOUTH 3 TIMES A DAY 8/23/22   Sangeeta Gonzalez MD   Elastic Bandages & Supports (ELBOW BRACE) MISC 1 each by Does not apply route at bedtime Please dispense left cubital tunnel brace 8/18/22 11/16/22  ORLY Garcia - CNP   alendronate (FOSAMAX) 70 MG tablet Take 1 tablet by mouth every 7 days  Patient taking differently: Take 70 mg by mouth every 7 days Wednesdays 7/29/22   Angus Mejias MD   oxyCODONE-acetaminophen (PERCOCET) 5-325 MG per tablet Take by mouth every 4 hours as needed for Pain. Historical Provider, MD   atorvastatin (LIPITOR) 40 MG tablet Take 1 tablet by mouth at bedtime 7/15/22   Leon Patient, MD   furosemide (LASIX) 20 MG tablet Take 0.5 tablets by mouth every other day 7/15/22   Leon Barrera MD   blood glucose test strips (PRODIGY NO CODING BLOOD GLUC) strip Use to test once daily and as needed as directed by provider. Please dispense Prodigy brand. 7/12/22   Gloria Childers MD   blood glucose monitor kit and supplies Dispense sufficient amount for indicated testing frequency plus additional to accommodate PRN testing needs. Dispense all needed supplies to include: monitor, strips, lancing device, lancets, control solutions, alcohol swabs.    Prodigy Brand 7/6/22   Gloria Childers MD   glipiZIDE (GLUCOTROL) 10 MG tablet Take 1 tab bid 1/13/22   Gloria Childers MD   omeprazole (PRILOSEC) 20 MG delayed release capsule Take 1 capsule by mouth 2 times daily 7/26/21   Gloria Childers MD   albuterol sulfate HFA (PROVENTIL HFA) 108 (90 Base) MCG/ACT inhaler Inhale 2 puffs into the lungs every 4 hours as needed for Wheezing 4/20/21 22  Kristina Peabody, MD   repaglinide (PRANDIN) 1 MG tablet TAKE 1 TABLET BY MOUTH 3 TIMES A DAY BEFORE MEALS 20   Kristina Peabody, MD   bimatoprost (LUMIGAN) 0.01 % SOLN ophthalmic drops Place 1 drop into both eyes nightly 18   Historical Provider, MD   Blood Glucose Monitoring Suppl (PRODIGY AUTOCODE BLOOD GLUCOSE) w/Device KIT 1 Device by Does not apply route 3 times daily 19   Kristina Peabody, MD     Past Medical History    has a past medical history of Cervical spondylosis, CKD (chronic kidney disease), COVID-19, Fall, Generalized anxiety disorder, GERD (gastroesophageal reflux disease), History of recent hospitalization, History of swelling of feet, Hyperlipidemia, Hypertension, Left hand weakness, Lumbar radiculopathy, DIEGO (nonalcoholic steatohepatitis), Obesity, Osteoarthritis of left knee, Recurrent major depressive disorder ([facility] [address] Restless legs syndrome, Snores, TIA (transient ischemic attack), Type II or unspecified type diabetes mellitus without mention of complication, not stated as uncontrolled, Wears glasses, and Wellness examination. Past Surgical History   has a past surgical history that includes Upper gastrointestinal endoscopy (2012); Colonoscopy (2012);  section; Hysterectomy, total abdominal (); shoulder surgery (Left, 2021); Spine surgery (Left, 10/03/2022); Spine surgery (Left, 10/06/2022); and Tonsillectomy. Social History   reports that she has never smoked. She has never used smokeless tobacco.   reports current alcohol use. reports no history of drug use. Marital Status    Children 2  Occupation [employer] [facility] [employer]   Family History  Family Status   Relation Name Status    Mother      Father       family history includes Diabetes in her father and mother; Heart Attack in her father and mother.     Review of Systems:  CONSTITUTIONAL:   negative for fevers, chills, fatigue and malaise EYES:   negative for double vision, blurred vision and photophobia    HEENT:   negative for tinnitus, epistaxis and sore throat     RESPIRATORY:   negative for cough, shortness of breath, wheezing     CARDIOVASCULAR:   negative for chest pain, palpitations, syncope, edema     GASTROINTESTINAL:   negative for nausea, vomiting     GENITOURINARY:   negative for incontinence     MUSCULOSKELETAL:   negative for neck or back pain see HPI    NEUROLOGICAL:   Negative for weakness  negative for headaches and dizziness     PSYCHIATRIC:   negative for anxiety       OBJECTIVE:   VITALS:  temporal temperature is 97.7 °F (36.5 °C). Her blood pressure is 129/78 and her pulse is 76. Her respiration is 20 and oxygen saturation is 97%. CONSTITUTIONAL:alert & oriented x 3, no acute distress. Calm and pleasant. SKIN:  Warm and dry, no rashes to exposed areas of skin. HEAD:  Normocephalic, atraumatic. EYES: PERRL. EOMs intact. Wearing glasses. EARS:  Intact and equal bilaterally. No edema or thickening, without lumps, lesions, or discharge. Hearing grossly WNL. NOSE:  Nares patent. No rhinorrhea. MOUTH/THROAT:  Mucous membranes pink and moist, uvula midline, teeth appear to be intact. NECK: Supple, no lymphadenopathy. LUNGS: Respirations even and non-labored. Clear to auscultation bilaterally, no wheezes, rales, or rhonchi. CARDIOVASCULAR: Irregular rate and rhythm, no murmurs/rubs/gallops. ABDOMEN: soft, non-tender and non-distended, bowel sounds active x 4. EXTREMITIES: No edema to bilateral lower extremities. No varicosities to bilateral lower extremities. NEUROLOGIC: CN II-XII are grossly intact. Gait not assessed. IMPRESSIONS:   CUBITAL TUNNEL SYNDROME. PLANS:   CUBITAL TUNNEL RELEASE - Left. Notified Dr. Osman Parks regarding irregular HR.      ORLY Montano CNP   Electronically signed 11/23/2022 at 8:48 AM

## 2022-11-23 NOTE — PROGRESS NOTES
Neurosurgery Post op Progress Note      POD# 0 s/p left carpal tunnel release. SUBJECTIVE:      Patient presents with left wrist pain, s/p left carpal tunnel release. OBJECTIVE      Physical exam   VITALS:    Vitals:    11/23/22 1401   BP: 128/84   Pulse: 93   Resp: 14   Temp: 97 °F (36.1 °C)   SpO2: 93%       Gen: Resting comfortably, no acute distress  CV: RRR  Resp: Lungs clear to ausculation  Abd: soft, non-tender  Skin: cap refill <2 seconds    Neurological exam     CONSTITUTIONAL:  Well developed, well nourished, alert and oriented x 3, in no acute distress. GCS 15. Nontoxic. No dysarthria. No aphasia. HEAD:  normocephalic, atraumatic    EYES:  PERRLA, EOMI.   ENT:  moist mucous membranes   NECK:  supple, symmetric, no midline tenderness to palpation    BACK:  without midline tenderness, step-offs or deformities    LUNGS:  Equal air entry bilaterally       ABDOMEN:  Soft, no rigidity   NEUROLOGIC:  Mental Status:  A & O x3,awake                 Motor Exam:    Able to wiggle all 5 fingers, good capillary refill in all five fingers, sensation intact all 5 fingers. SKIN:  no rash        Wound   Post op wound:  left wrist    Dressing is clean/dry/intact with no signs of drainage   Sling in place        ASSESSMENT AND PLAN    65 y.o. female is post op day # 0 s/p left carpal tunnel release.      - Analgesia: percocet 5 mg q8 PRN, Flexeril 5 mg BID PRN  - Periop Antibiotics: Ancef 1g q8hrs for 3 doses   - Activity: As tolerated  - DVT prophylaxis: SCDs  - Diet: Advance as tolerated  - D/C home    Electronically signed by Geno Montemayor DO on 11/23/2022 at 2:31 PM

## 2022-11-25 ENCOUNTER — CARE COORDINATION (OUTPATIENT)
Dept: OTHER | Facility: CLINIC | Age: 62
End: 2022-11-25

## 2022-11-25 NOTE — CARE COORDINATION
Ambulatory Care Coordination Note  11/25/2022    ACC: Kapil Last, LEELA    Pt states she is not having a lot of pain, she expected more but is doing better than she thought. She has only taken 3 pain pills she said. She is wearing her sling, we reviewed her discharge instructions and she said she has not really looked at them yet, I suggested she could have her  get them and they could go through them together. She has a follow up with Dr Melissa Fish on 12/6/22 and with Patsy Fuentes on 12/8/22. Pt has Dr Hanna Pan phone number memorized and she repeated it back to me , for her to call with any concerns or onset of new symptoms. She denies fever, she is drinking well, had one episode of nausea no emesis, took a zofran and she said that took care of it. Her  is home with her today. She states she is urinating without any problem I reminded patient of constipation with the narcotic pain medication and to watch closely to avoid this. Will forward note onto ACM following patient for follow up     Offered patient enrollment in the Remote Patient Monitoring (RPM) program for in-home monitoring: NA.            Prior to Admission medications    Medication Sig Start Date End Date Taking? Authorizing Provider   oxyCODONE-acetaminophen (PERCOCET) 5-325 MG per tablet Take 1 tablet by mouth every 8 hours as needed for Pain for up to 7 days. Intended supply: 7 days.  Take lowest dose possible to manage pain 11/23/22 11/30/22  Ros Moulton, DO   methocarbamol (ROBAXIN-750) 750 MG tablet Take 1 tablet by mouth 3 times daily as needed (pain) 11/23/22 11/28/22  Adryan Carballo, DO   methocarbamol (ROBAXIN) 750 MG tablet  11/8/22   Historical Provider, MD   Prodigy Lancets 28G MISC 1 Bottle by Does not apply route three times daily 11/20/22   Traci Rogers, DO   magnesium oxide (MAG-OX) 400 MG tablet Take 1 tablet by mouth daily 11/10/22   Omkar Paulson MD   gabapentin (NEURONTIN) 400 MG capsule Take 1 capsule by mouth 3 times daily for 30 days. 11/9/22 12/9/22  Mo'Men Mekhi Johns MD   ASPIRIN LOW DOSE 81 MG EC tablet TAKE 1 TABLET BY MOUTH ONE TIME A DAY 11/8/22   Van Pinedo MD   diazePAM (VALIUM) 2 MG tablet Take 1 tablet by mouth every 6 hours as needed for Anxiety for up to 20 doses. 10/25/22 11/30/22  Jojo Story MD   busPIRone (BUSPAR) 10 MG tablet Take 10 mg by mouth in the morning and 10 mg in the evening.     Historical Provider, MD   celecoxib (CELEBREX) 100 MG capsule Take 100 mg by mouth 2 times daily    Historical Provider, MD   glipiZIDE (GLUCOTROL) 10 MG tablet Take 10 mg by mouth 2 times daily (before meals)    Historical Provider, MD   furosemide (LASIX) 20 MG tablet Take 20 mg by mouth daily    Historical Provider, MD   lisinopril (PRINIVIL;ZESTRIL) 40 MG tablet Take 40 mg by mouth daily    Historical Provider, MD   bimatoprost (LUMIGAN) 0.01 % SOLN ophthalmic drops Place 1 drop into both eyes nightly    Historical Provider, MD   omeprazole (PRILOSEC) 20 MG delayed release capsule Take 20 mg by mouth daily    Historical Provider, MD   rOPINIRole (REQUIP) 2 MG tablet Take 2 mg by mouth nightly    Historical Provider, MD   atorvastatin (LIPITOR) 40 MG tablet Take 40 mg by mouth nightly    Historical Provider, MD   sertraline (ZOLOFT) 50 MG tablet Take 50 mg by mouth daily    Historical Provider, MD   Empagliflozin-metFORMIN HCl (SYNJARDY) 5-1000 MG TABS Take 1,000 mg by mouth 2 times daily (before meals)    Historical Provider, MD   Dulaglutide (TRULICITY) 1.5 KV/8.6DA SOPN Inject 1.5 mg into the skin once a week    Historical Provider, MD   albuterol sulfate HFA (PROVENTIL;VENTOLIN;PROAIR) 108 (90 Base) MCG/ACT inhaler Inhale 2 puffs into the lungs every 6 hours as needed for Wheezing    Historical Provider, MD   diclofenac sodium (VOLTAREN) 1 % GEL Apply 2 g topically as needed for Pain    Historical Provider, MD   docusate sodium (COLACE) 100 MG capsule Take 100 mg by mouth 2 times daily as needed for Constipation    Historical Provider, MD   cyclobenzaprine (FLEXERIL) 10 MG tablet Take 10 mg by mouth 3 times daily as needed for Muscle spasms    Historical Provider, MD   rOPINIRole (REQUIP) 2 MG tablet  8/29/22   Historical Provider, MD   sertraline (ZOLOFT) 50 MG tablet TAKE ONE TABLET BY MOUTH EVERY MORNING 9/21/22   Edward Chand MD   TRULICITY 1.5 UX/6.3OM SOPN INJECT THE CONTENTS OF ONE SYRINGE UNDER THE SKIN ONCE WEEKLY  Patient taking differently: Inject 1.5 mg into the skin once a week Saturdays 9/21/22   Edward Chand MD   celecoxib (CELEBREX) 100 MG capsule TAKE 1 CAPSULE BY MOUTH 2 TIMES A DAY 9/8/22   Lenora Stephen MD   SYNJARDY 5-1000 MG TABS TAKE 1 TABLET BY MOUTH 2 TIMES A DAY 8/30/22   Edward Chand MD   lisinopril (PRINIVIL;ZESTRIL) 40 MG tablet Take 1 tablet by mouth daily 8/25/22   Renato Velasco MD   busPIRone (BUSPAR) 10 MG tablet TAKE 1 TABLET BY MOUTH 3 TIMES A DAY 8/23/22   Sangeeta Gonzalez MD   Elastic Bandages & Supports (ELBOW BRACE) MISC 1 each by Does not apply route at bedtime Please dispense left cubital tunnel brace 8/18/22 11/16/22  ORLY Hardy - CNP   alendronate (FOSAMAX) 70 MG tablet Take 1 tablet by mouth every 7 days  Patient taking differently: Take 70 mg by mouth every 7 days Wednesdays 7/29/22   Paola Carrero MD   oxyCODONE-acetaminophen (PERCOCET) 5-325 MG per tablet Take by mouth every 4 hours as needed for Pain. Historical Provider, MD   atorvastatin (LIPITOR) 40 MG tablet Take 1 tablet by mouth at bedtime 7/15/22   Kalina Cooper MD   furosemide (LASIX) 20 MG tablet Take 0.5 tablets by mouth every other day 7/15/22   Kalina Cooper MD   blood glucose test strips (PRODIGY NO CODING BLOOD GLUC) strip Use to test once daily and as needed as directed by provider. Please dispense Prodigy brand.  7/12/22   Edward Chand MD   blood glucose monitor kit and supplies Dispense sufficient amount for indicated testing frequency plus additional to accommodate PRN testing needs. Dispense all needed supplies to include: monitor, strips, lancing device, lancets, control solutions, alcohol swabs. Prodigy Brand 7/6/22   Duane Galicia MD   glipiZIDE (GLUCOTROL) 10 MG tablet Take 1 tab bid 1/13/22   Duane Galicia MD   omeprazole (PRILOSEC) 20 MG delayed release capsule Take 1 capsule by mouth 2 times daily 7/26/21   Duane Galicia MD   albuterol sulfate HFA (PROVENTIL HFA) 108 (90 Base) MCG/ACT inhaler Inhale 2 puffs into the lungs every 4 hours as needed for Wheezing 4/20/21 4/20/22  Duane Galicia MD   repaglinide (PRANDIN) 1 MG tablet TAKE 1 TABLET BY MOUTH 3 TIMES A DAY BEFORE MEALS 12/8/20   Duane Galicia MD   bimatoprost (LUMIGAN) 0.01 % SOLN ophthalmic drops Place 1 drop into both eyes nightly 4/26/18   Historical Provider, MD   Blood Glucose Monitoring Suppl (PRODIGY AUTOCODE BLOOD GLUCOSE) w/Device KIT 1 Device by Does not apply route 3 times daily 4/26/19   Duane Galicia MD       Future Appointments   Date Time Provider Nicola Zaidi   12[facility]2022 10:30 [facility]   12/8/2022 10:00 AM ORLY Zafar - CNP [facility]   1/20/2023 10:30 AM DO [facility]   2/15/2023  3:30 PM Omkar Montaño MD Neuro [facility] Neurology -          Discharge Instructions below copied from AVS:    Wound care:  Keep sling on for 5 days, keep dressing on for 5 days. Can use the arm normally after  Can shower if dressing/wound is covered  Call office if there is increased pain, swelling, redness, drainage, fevers. Pain and swelling is expected 1st 2-4 days and  should subside after  Can resume OT after evaluation after 1st visit. No alcoholic beverages, no driving or operating machinery, no making important decisions for 24 hours. You may have a normal diet but should eat lightly day of surgery. Drink plenty of fluids.   Urinate within 8 hours after surgery, if unable to urinate call your doctor Call your doctor for the following:  Chills  Temperature greater than 101  Pain that is not tolerable despite taking pain medicine as ordered  There is increased swelling, redness or warmth at surgical site  There is increased drainage or bleeding from surgical site  Do not remove surgical dressing unless instructed to do so by your surgeon

## 2022-11-29 DIAGNOSIS — E11.9 TYPE 2 DIABETES MELLITUS WITHOUT COMPLICATION, WITHOUT LONG-TERM CURRENT USE OF INSULIN (HCC): ICD-10-CM

## 2022-11-29 DIAGNOSIS — F33.9 RECURRENT MAJOR DEPRESSIVE DISORDER, REMISSION STATUS UNSPECIFIED (HCC): ICD-10-CM

## 2022-11-29 DIAGNOSIS — I10 PRIMARY HYPERTENSION: ICD-10-CM

## 2022-11-30 RX ORDER — BLOOD-GLUCOSE CONTROL, LOW
100 EACH MISCELLANEOUS 3 TIMES DAILY
Qty: 100 EACH | Refills: 3 | Status: SHIPPED | OUTPATIENT
Start: 2022-11-30

## 2022-11-30 RX ORDER — LISINOPRIL 40 MG/1
TABLET ORAL
Qty: 30 TABLET | Refills: 2 | Status: SHIPPED | OUTPATIENT
Start: 2022-11-30

## 2022-11-30 RX ORDER — OMEPRAZOLE 20 MG/1
CAPSULE, DELAYED RELEASE ORAL
Qty: 180 CAPSULE | Refills: 2 | Status: SHIPPED | OUTPATIENT
Start: 2022-11-30

## 2022-11-30 RX ORDER — GLIPIZIDE 10 MG/1
TABLET ORAL
Qty: 180 TABLET | Refills: 0 | Status: SHIPPED | OUTPATIENT
Start: 2022-11-30

## 2022-11-30 NOTE — TELEPHONE ENCOUNTER
Please address the medication refill and close the encounter. If I can be of assistance, please route to the applicable pool. Thank you. Last visit: 11-8-22  Last Med refill: 1-13-22  Does patient have enough medication for 72 hours: No:     Next Visit Date:  Future Appointments   Date Time Provider Nicola Dyan   12/1/2022  9:00 AM Tanesha Cardoza MD [address] [facility]   12/6/2022 10:30 AM Tanesha Cardoza MD [facility]   12/8/2022 10:00 AM ORLY Mejias - CNP [facility]   1/20/2023 10:30 AM Maye Ramirez DO [facility]   2/15/2023  3:30 PM Mo'Rosanne Contreras MD Neuro [facility] Neurology -       Health Maintenance   Topic Date Due    Shingles vaccine (1 of 2) Never done    Diabetic retinal exam  11/01/2017    Diabetic foot exam  12/01/2017    COVID-19 Vaccine (3 - Booster for Collie Piper series) 04/08/2021    Lipids  06/24/2022    Breast cancer screen  11/14/2022    Depression Monitoring  05/26/2023    Diabetic microalbuminuria test  09/22/2023    A1C test (Diabetic or Prediabetic)  11/08/2023    Colorectal Cancer Screen  11/22/2025    DTaP/Tdap/Td vaccine (3 - Td or Tdap) 08/25/2032    Flu vaccine  Completed    Pneumococcal 0-64 years Vaccine  Completed    Hepatitis C screen  Completed    HIV screen  Completed    Hepatitis A vaccine  Aged Out    Hib vaccine  Aged Out    Meningococcal (ACWY) vaccine  Aged Out       Hemoglobin A1C (%)   Date Value   11/08/2022 7.7   08/25/2022 6.9   05/26/2022 7.9             ( goal A1C is < 7)   Microalb/Crt.  Ratio (mcg/mg creat)   Date Value   09/22/2022 Can not be calculated     LDL Cholesterol (mg/dL)   Date Value   06/24/2021 52   04/20/2021 48       (goal LDL is <100)   AST (U/L)   Date Value   10/04/2022 35 (H)     ALT (U/L)   Date Value   10/04/2022 58 (H)     BUN (mg/dL)   Date Value   10/19/2022 33 (H)     BP Readings from Last 3 Encounters:   11/23/22 128/84   11/22/22 124/79   11/09/22 102/71          (goal 120/80)    All Future Testing planned in [facility]  Lab Frequency Next Occurrence   Saline lock IV Once 05/10/2022   Lipid Panel Once 06/26/2022   Comprehensive Metabolic Panel Once 92/87/4433   Vitamin D 25 Hydroxy Once 05/26/2022               Patient Active Problem List:     Cervical spondylosis     Type 2 diabetes mellitus without complication, without long-term current use of insulin (HCC)     Hypertension     Abnormal auditory perception     Nasal polyps     Osteoarthritis of left knee     Osteoarthritis of right knee     DIEGO (nonalcoholic steatohepatitis)     Vitamin D deficiency     Iron deficiency anemia     Dyslipidemia     Trochanteric bursitis     Chronic left shoulder pain     Restless legs syndrome     Generalized anxiety disorder     Toxic metabolic encephalopathy     Class 2 obesity in adult     Leg edema     Recurrent major depressive disorder (HCC)     Dermatitis     Retrolisthesis of vertebrae     Bilateral carpal tunnel syndrome     Intention tremor     Sciatica     Back pain with radiation     Left sciatic nerve pain     Lumbar disc disease with radiculopathy     Intractable back pain     Piriformis syndrome of left side     Ulnar neuropathy at elbow, left     S/P carpal tunnel release

## 2022-11-30 NOTE — TELEPHONE ENCOUNTER
Please address the medication refill and close the encounter. If I can be of assistance, please route to the applicable pool. Thank you. Last visit: 11-8-22  Last Med refill: ? Does patient have enough medication for 72 hours: No:     Next Visit Date:  Future Appointments   Date Time Provider Nicola Gabrieli   12/1/2022  9:00 AM Sidney Cool MD [facility]   12/6/2022 10:30 AM Sidney Cool MD [facility]   12/8/2022 10:00 AM ORLY Wood - CNP [facility]   1/20/2023 10:30 AM Yessi Lindsay DO [facility]   2/15/2023  3:30 PM Mo'Rosanne Stanley MD Neuro [facility] Neurology -       Health Maintenance   Topic Date Due    Shingles vaccine (1 of 2) Never done    Diabetic retinal exam  11/01/2017    Diabetic foot exam  12/01/2017    COVID-19 Vaccine (3 - Booster for Lorenda Meigs series) 04/08/2021    Lipids  06/24/2022    Breast cancer screen  11/14/2022    Depression Monitoring  05/26/2023    Diabetic microalbuminuria test  09/22/2023    A1C test (Diabetic or Prediabetic)  11/08/2023    Colorectal Cancer Screen  11/22/2025    DTaP/Tdap/Td vaccine (3 - Td or Tdap) 08/25/2032    Flu vaccine  Completed    Pneumococcal 0-64 years Vaccine  Completed    Hepatitis C screen  Completed    HIV screen  Completed    Hepatitis A vaccine  Aged Out    Hib vaccine  Aged Out    Meningococcal (ACWY) vaccine  Aged Out       Hemoglobin A1C (%)   Date Value   11/08/2022 7.7   08/25/2022 6.9   05/26/2022 7.9             ( goal A1C is < 7)   Microalb/Crt.  Ratio (mcg/mg creat)   Date Value   09/22/2022 Can not be calculated     LDL Cholesterol (mg/dL)   Date Value   06/24/2021 52   04/20/2021 48       (goal LDL is <100)   AST (U/L)   Date Value   10/04/2022 35 (H)     ALT (U/L)   Date Value   10/04/2022 58 (H)     BUN (mg/dL)   Date Value   10/19/2022 33 (H)     BP Readings from Last 3 Encounters:   11/23/22 128/84   11/22/22 124/79   11/09/22 102/71          (goal 120/80)    All Future Testing planned in [facility]  Lab Frequency Next Occurrence   Saline lock IV Once 05/10/2022   Lipid Panel Once 06/26/2022   Comprehensive Metabolic Panel Once 31/01/1808   Vitamin D 25 Hydroxy Once 05/26/2022               Patient Active Problem List:     Cervical spondylosis     Type 2 diabetes mellitus without complication, without long-term current use of insulin (HCC)     Hypertension     Abnormal auditory perception     Nasal polyps     Osteoarthritis of left knee     Osteoarthritis of right knee     DIEGO (nonalcoholic steatohepatitis)     Vitamin D deficiency     Iron deficiency anemia     Dyslipidemia     Trochanteric bursitis     Chronic left shoulder pain     Restless legs syndrome     Generalized anxiety disorder     Toxic metabolic encephalopathy     Class 2 obesity in adult     Leg edema     Recurrent major depressive disorder (HCC)     Dermatitis     Retrolisthesis of vertebrae     Bilateral carpal tunnel syndrome     Intention tremor     Sciatica     Back pain with radiation     Left sciatic nerve pain     Lumbar disc disease with radiculopathy     Intractable back pain     Piriformis syndrome of left side     Ulnar neuropathy at elbow, left     S/P carpal tunnel release

## 2022-12-01 ENCOUNTER — CARE COORDINATION (OUTPATIENT)
Dept: OTHER | Facility: CLINIC | Age: 62
End: 2022-12-01

## 2022-12-01 ENCOUNTER — TELEMEDICINE (OUTPATIENT)
Dept: FAMILY MEDICINE CLINIC | Age: 62
End: 2022-12-01
Payer: COMMERCIAL

## 2022-12-01 DIAGNOSIS — J06.9 VIRAL URI: Primary | ICD-10-CM

## 2022-12-01 PROCEDURE — 99442 PR PHYS/QHP TELEPHONE EVALUATION 11-20 MIN: CPT | Performed by: FAMILY MEDICINE

## 2022-12-01 RX ORDER — BLOOD-GLUCOSE CONTROL, LOW
1 EACH MISCELLANEOUS 3 TIMES DAILY
Qty: 100 EACH | Refills: 3 | OUTPATIENT
Start: 2022-12-01

## 2022-12-01 RX ORDER — BENZONATATE 100 MG/1
100 CAPSULE ORAL EVERY 6 HOURS PRN
Qty: 28 CAPSULE | Refills: 2 | Status: SHIPPED | OUTPATIENT
Start: 2022-12-01 | End: 2023-12-01

## 2022-12-01 NOTE — PROGRESS NOTES
Debi Jordan is a 65 y.o. female evaluated via telephone on 12/1/2022 for URI (Chest congestion, cough with white mucus, runny nose, headache, body aches - lasting 5 days. - daughter tested positive for flu)    Documentation:  I communicated with the patient and/or health care decision maker about . Rhinorrhea X 4 days  Cough with White phlegm  Fever X 48 hrs. Resolved  Able to eat and drink  Sick Contacts- positive for Flu  Details of this discussion including any medical advice provided: URI- Expectorant/Tlenol/Increased Fluid intake and Symptomatic treatment  Patient s/p CT release surgery- FU in 1 week with the Surgeon. Total Time: minutes: 11-20 minutes    Kavya De Santiago was evaluated through a synchronous (real-time) audio encounter. Patient identification was verified at the start of the visit. She (or guardian if applicable) is aware that this is a billable service, which includes applicable co-pays. This visit was conducted with the patient's (and/or legal guardian's) verbal consent. She has not had a related appointment within my department in the past 7 days or scheduled within the next 24 hours. The patient was located at Home: [address] [place] [address]. The provider was located at [facility] (Appt Dept): [address] [place] [address].     Note: not billable if this call serves to triage the patient into an appointment for the relevant concern    Blayne Elizalde MD

## 2022-12-01 NOTE — CARE COORDINATION
Ambulatory Care Coordination Note  2022    ACC: Presley Cortes RN    Ambulatory Care Manager [facility]) contacted the patient by telephone to follow up on progress, discuss new issues or concerns, and reinforce/ provide patient education. Verified name and  with patient as identifiers. Patient states her surgery went better than she expected. Patient states she did have pain that night after surgery. She took pain medication and by the time she woke up the next day it was resolved. Patient currently has no pain. Still c/o numbness in left ring and small finger. Patient removed her sling post op day 5 (Tuesday). States incision looks good and steri strips are intact. Patient will follow up with Dr. Soraya Kennedy NP on 2022. Patient states she had a steroid injection in pyriformis on Monday. She will have another injection on 2022. Patient c/o pain in medial thigh area so she is going to see if this area can be injected also. Patient states she does have some relief from pain after injection. Patient had a vv today with her PCP. Patient's household currently has flu/flu symptoms. Patient's daughter was tested for flu yesterday and was positive. Patient c/o cough, congestion, sore throat, and body aches. Her symptoms started Saturday. Patient states she is starting to feel somewhat better. States she was advised by her PCP to contact her tomorrow if she does not feel any better and she will send in a prescription for amoxicillin. I advised patient to stay hydrated and drink plenty of fluids. Also to make sure she is up walking and doing deep breathing exercises. Patient was coughing multiple times during our conversation. Patient states she has been doing these recommendations and will continue. Patient has no questions or concerns at this time.     Reinforced/ Provided Education:  Discussed red flags and appropriate site of care based on symptoms and resources available to patient including: PCP  Specialist  Benefits related nurse triage line  Urgent care clinics  [facility] 24/7  When to call [address] Importance and benefits of: Follow up with PCP and specialist, medication adherence, self monitoring and reporting of symptoms. Plan:  Continue weekly outreaches to provide telephonic support, education and resources as needed. Discuss / follow up on: Goal progress and how she is recovering from illness, if antibiotics were prescribed, if she has any improvement after her injection. Pt verbalized understanding and is agreeable to follow up contact. Offered patient enrollment in the Remote Patient Monitoring (RPM) program for in-home monitoring: NA. Lab Results       None                 Goals Addressed                   This Visit's Progress     Conditions and Symptoms   On track     I will schedule office visits, as directed by my provider. I will keep my appointment or reschedule if I have to cancel. I will notify my provider of any barriers to my plan of care. I will notify my provider of any symptoms that indicate a worsening of my condition. Barriers: none  Plan for overcoming my barriers: work with ACM  Confidence: 10/10  Anticipated Goal Completion Date: 7/27/2022                Prior to Admission medications    Medication Sig Start Date End Date Taking?  Authorizing Provider   benzonatate (TESSALON PERLES) 100 MG capsule Take 1 capsule by mouth every 6 hours as needed for Cough 12/1/22 12/1/23  Arnulfo Carballo MD   glipiZIDE (GLUCOTROL) 10 MG tablet TAKE 1 TABLET BY MOUTH TWO TIMES A DAY 11/30/22   Arnulfo Carballo MD   sertraline (ZOLOFT) 50 MG tablet TAKE ONE TABLET BY MOUTH EVERY MORNING 11/30/22   Arnulfo Carballo MD   omeprazole (PRILOSEC) 20 MG delayed release capsule TAKE 1 CAPSULE BY MOUTH 2 TIMES A DAY 11/30/22   Arnulfo Carballo MD   lisinopril (PRINIVIL;ZESTRIL) 40 MG tablet TAKE 1 TABLET BY MOUTH ONE TIME A DAY 11/30/22   Arnulfo Carballo MD Prodigy Lancets 28G MISC 100 Lancet by Does not apply route three times daily 11/30/22   Barbara Oilvares MD   methocarbamol (ROBAXIN) 750 MG tablet  11/8/22   Historical Provider, MD   Prodigy Lancets 28G MISC 1 Bottle by Does not apply route three times daily 11/20/22   Traci Rogers DO   magnesium oxide (MAG-OX) 400 MG tablet Take 1 tablet by mouth daily 11/10/22   Mo'Rosanne Paulson MD   gabapentin (NEURONTIN) 400 MG capsule Take 1 capsule by mouth 3 times daily for 30 days. 11/9/22 12/9/22  Mo'Rosanne Paulson MD   ASPIRIN LOW DOSE 81 MG EC tablet TAKE 1 TABLET BY MOUTH ONE TIME A DAY 11/8/22   Tyler Mckeon MD   busPIRone (BUSPAR) 10 MG tablet Take 10 mg by mouth in the morning and 10 mg in the evening.     Historical Provider, MD   celecoxib (CELEBREX) 100 MG capsule Take 100 mg by mouth 2 times daily    Historical Provider, MD   glipiZIDE (GLUCOTROL) 10 MG tablet Take 10 mg by mouth 2 times daily (before meals)    Historical Provider, MD   furosemide (LASIX) 20 MG tablet Take 20 mg by mouth daily    Historical Provider, MD   bimatoprost (LUMIGAN) 0.01 % SOLN ophthalmic drops Place 1 drop into both eyes nightly    Historical Provider, MD   omeprazole (PRILOSEC) 20 MG delayed release capsule Take 20 mg by mouth daily    Historical Provider, MD   rOPINIRole (REQUIP) 2 MG tablet Take 2 mg by mouth nightly    Historical Provider, MD   atorvastatin (LIPITOR) 40 MG tablet Take 40 mg by mouth nightly    Historical Provider, MD   sertraline (ZOLOFT) 50 MG tablet Take 50 mg by mouth daily    Historical Provider, MD   Empagliflozin-metFORMIN HCl (SYNJARDY) 5-1000 MG TABS Take 1,000 mg by mouth 2 times daily (before meals)    Historical Provider, MD   Dulaglutide (TRULICITY) 1.5 SZ/9.6LY SOPN Inject 1.5 mg into the skin once a week    Historical Provider, MD   albuterol sulfate HFA (PROVENTIL;VENTOLIN;PROAIR) 108 (90 Base) MCG/ACT inhaler Inhale 2 puffs into the lungs every 6 hours as needed for Wheezing Historical Provider, MD   diclofenac sodium (VOLTAREN) 1 % GEL Apply 2 g topically as needed for Pain    Historical Provider, MD   docusate sodium (COLACE) 100 MG capsule Take 100 mg by mouth 2 times daily as needed for Constipation    Historical Provider, MD   cyclobenzaprine (FLEXERIL) 10 MG tablet Take 10 mg by mouth 3 times daily as needed for Muscle spasms    Historical Provider, MD   rOPINIRole (REQUIP) 2 MG tablet  8/29/22   Historical Provider, MD   TRULICITY 1.5 ZB/3.6PC [address]  Patient taking differently: Inject 1.5 mg into the skin once a week Saturdays 9/21/22   Duane Galicia MD   celecoxib (CELEBREX) 100 MG capsule TAKE 1 CAPSULE BY MOUTH 2 TIMES A DAY 9/8/22   Gary Dave MD   SYNJARDY 5-1000 MG TABS TAKE 1 TABLET BY MOUTH 2 TIMES A DAY 8/30/22   Duane Galicia MD   busPIRone (BUSPAR) 10 MG tablet TAKE 1 TABLET BY MOUTH 3 TIMES A DAY 8/23/22   Sangeeta Gonzalez MD   Elastic Bandages & Supports (ELBOW BRACE) MISC 1 each by Does not apply route at bedtime Please dispense left cubital tunnel brace 8/18/22 11/16/22  ORLY Zafar - CNP   alendronate (FOSAMAX) 70 MG tablet Take 1 tablet by mouth every 7 days  Patient taking differently: Take 70 mg by mouth every 7 days Wednesdays 7/29/22   Abhay Piedra MD   oxyCODONE-acetaminophen (PERCOCET) 5-325 MG per tablet Take by mouth every 4 hours as needed for Pain. Historical Provider, MD   atorvastatin (LIPITOR) 40 MG tablet Take 1 tablet by mouth at bedtime 7/15/22   Clinton Ibarra MD   furosemide (LASIX) 20 MG tablet Take 0.5 tablets by mouth every other day 7/15/22   Clinton Ibarra MD   blood glucose test strips (PRODIGY NO CODING BLOOD GLUC) strip Use to test once daily and as needed as directed by provider. Please dispense Prodigy brand.  7/12/22   Duane Galicia MD   blood glucose monitor kit and supplies Dispense sufficient amount for indicated testing frequency plus additional to accommodate PRN testing needs. Dispense all needed supplies to include: monitor, strips, lancing device, lancets, control solutions, alcohol swabs.    Prodigy Brand 7/6/22   Vanda Berger MD   albuterol sulfate HFA (PROVENTIL HFA) 108 (90 Base) MCG/ACT inhaler Inhale 2 puffs into the lungs every 4 hours as needed for Wheezing 4/20/21 4/20/22  Vanda Berger MD   repaglinide (PRANDIN) 1 MG tablet TAKE 1 TABLET BY MOUTH 3 TIMES A DAY BEFORE MEALS 12/8/20   Vanda Berger MD   bimatoprost (LUMIGAN) 0.01 % SOLN ophthalmic drops Place 1 drop into both eyes nightly 4/26/18   Historical Provider, MD   Blood Glucose Monitoring Suppl (PRODIGY AUTOCODE BLOOD GLUCOSE) w/Device KIT 1 Device by Does not apply route 3 times daily 4/26/19   Vanda Berger MD       Future Appointments   Date Time Provider [facility]   12/8/2022 10:00 AM ORLY [facility] Baptiste [facility]   1/20/2023 10:30 AM DO Artemio Kincaid [facility]   2/15/2023  3:30 PM Mo'Rosanne Monson MD Neuro [facility] Neurology -

## 2022-12-01 NOTE — PROGRESS NOTES
Visit Information    Have you changed or started any medications since your last visit including any over-the-counter medicines, vitamins, or herbal medicines? no   Have you stopped taking any of your medications? Is so, why? -  no  Are you having any side effects from any of your medications? - no    Have you seen any other physician or provider since your last visit? yes - Neurology, Neurosurgery, PT/OT   Have you had any other diagnostic tests since your last visit? yes - Labs, MRI, FL, US, Dexa, XR, EKG   Have you been seen in the emergency room and/or had an admission in a hospital since we last saw you?  yes - [address]   Have you had your routine dental cleaning in the past 6 months?  no     Do you have an active MyChart account? If no, what is the barrier?   Yes    Patient Care Team:  Nhan Bell MD as PCP - General (Family Medicine)  Nhan Bell MD [facility]  Maria Esther Felix MD as Consulting Physician (Gastroenterology)  Moni Cleary RN as [address] [place] [address] History Review  Past Medical, Family, and Social History reviewed and does not contribute to the patient presenting condition    Health Maintenance   Topic Date Due    Shingles vaccine (1 of 2) Never done    Diabetic retinal exam  11/01/2017    Diabetic foot exam  12/01/2017    COVID-19 Vaccine (3 - Booster for John [place] series) 04/08/2021    Lipids  06/24/2022    Breast cancer screen  11/14/2022    Depression Monitoring  05/26/2023    Diabetic microalbuminuria test  09/22/2023    A1C test (Diabetic or Prediabetic)  11/08/2023    Colorectal Cancer Screen  11/22/2025    DTaP/Tdap/Td vaccine (3 - Td or Tdap) 08/25/2032    Flu vaccine  Completed    Pneumococcal 0-64 years Vaccine  Completed    Hepatitis C screen  Completed    HIV screen  Completed    Hepatitis A vaccine  Aged Out    Hib vaccine  Aged Out    Meningococcal (ACWY) vaccine  Aged Out

## 2022-12-01 NOTE — PATIENT INSTRUCTIONS
Thank you for letting us take care of you today. We hope all your questions were addressed. If a question was overlooked or something else comes to mind after you return home, please contact a member of your Care Team listed below. Your Care Team at [place] [address] is Team #1  Chela Cano MD (Faculty)  Abbey Clemens MD(Resident)  Zelda oSng MD (Resident)  Tucker White DO (Resident)  Kavya Willett, CHIVO Lion., CHIVO Nesbitt., SHERRY Sibley., Lydia Salas., Caryle Cree [facility] office)  Chava Barker, [address] (Clinical Practice Manager)  Yanci Forte, [facility] (Clinical Pharmacist)     Office phone number: [phone]    If you need to get in right away due to illness, please be advised we have \"Same Day\" appointments available Monday-Friday. Please call us at [phone] option #[phone] to schedule your \"Same Day\" appointment.

## 2022-12-02 RX ORDER — AMOXICILLIN 500 MG/1
500 CAPSULE ORAL 2 TIMES DAILY
Qty: 14 CAPSULE | Refills: 0 | Status: SHIPPED | OUTPATIENT
Start: 2022-12-02 | End: 2022-12-09

## 2022-12-05 RX ORDER — LANOLIN ALCOHOL/MO/W.PET/CERES
CREAM (GRAM) TOPICAL
Qty: 30 TABLET | Refills: 3 | OUTPATIENT
Start: 2022-12-05

## 2022-12-05 NOTE — TELEPHONE ENCOUNTER
E-scribe requesting refill for magnesium. Please review and e-scribe if applicable.      Last Visit Date: 12.5.22    Next Visit Date:  Future Appointments   Date Time Provider Nicola Dyan   12/8/2022 10:00 AM ORLY Mejias - CNP [facility]   12/28/2022  3:45 PM Cordell Shabazz MD [facility]   1/20/2023 10:30 AM Maye Ramirez DO [facility]   2/15/2023  3:30 PM Omkar Contreras MD Neuro [facility] Neurology -

## 2022-12-07 ENCOUNTER — CARE COORDINATION (OUTPATIENT)
Dept: OTHER | Facility: CLINIC | Age: 62
End: 2022-12-07

## 2022-12-07 NOTE — CARE COORDINATION
Ambulatory Care Coordination Note  2022    ACC: Yesy Zhang, RN    Ambulatory Care Manager [facility]) contacted the patient by telephone to follow up on progress, discuss new issues or concerns, and reinforce/ provide patient education. Verified name and  with patient as identifiers. Patient states she still has a lingering cough. States she is sitting in a chair at night and coughing all night. She was prescribed amoxicillin by PCP and states it is just not helping much. Tessalon perles are not helping either. Patient states she isn't getting worse but doesn't feel much better. She is walking and practicing coughing/deep breathing. Staying hydrated with water, hot tea, hot coffee. States she will call her PCP today. Patient had injection in pyriformis Monday. States this was given a little lower in buttocks area due to still having thigh pain. Patient is hoping this will help. States she has to call to schedule her next appt since the doctor was not sure when he could do the next injection. Patient will follow up with Dr. Esther Montes tomorrow for post op. States incision looks good. Still has no pain. Reports she still has a lot of numbness in fingers. Will discuss with [facility] tomorrow. Reinforced/ Provided Education:  Discussed red flags and appropriate site of care based on symptoms and resources available to patient including: PCP  Specialist  Benefits related nurse triage line  Urgent care clinics  When to call [address] Importance and benefits of: Follow up with PCP and specialist, medication adherence, self monitoring and reporting of symptoms. Plan:  Continue weekly outreaches to provide telephonic support, education and resources as needed. Discuss / follow up on: Goal progress and cough status, outcome of f/u appt with Dr. Esther Montes, status of pyriformis/thigh pain. Pt verbalized understanding and is agreeable to follow up contact.        Offered patient enrollment in the Remote Patient Monitoring (RPM) program for in-home monitoring: NA. Lab Results       None                 Goals Addressed                   This Visit's Progress     Conditions and Symptoms   On track     I will schedule office visits, as directed by my provider. I will keep my appointment or reschedule if I have to cancel. I will notify my provider of any barriers to my plan of care. I will notify my provider of any symptoms that indicate a worsening of my condition. Barriers: none  Plan for overcoming my barriers: work with [facility]  Confidence: 10/10  Anticipated Goal Completion Date: 7/27/2022                Prior to Admission medications    Medication Sig Start Date End Date Taking? Authorizing Provider   amoxicillin (AMOXIL) 500 MG capsule Take 1 capsule by mouth 2 times daily for 7 days 12/2/22 12/9/22  Jennifer Couch MD   benzonatate (TESSALON PERLES) 100 MG capsule Take 1 capsule by mouth every 6 hours as needed for Cough 12/1/22 12/1/23  Jennifer Couch MD   glipiZIDE (GLUCOTROL) 10 MG tablet TAKE 1 TABLET BY MOUTH TWO TIMES A DAY 11/30/22   Jennifer Couch MD   sertraline (ZOLOFT) 50 MG tablet TAKE ONE TABLET BY MOUTH EVERY MORNING 11/30/22   Jennifer Couch MD   omeprazole (PRILOSEC) 20 MG delayed release capsule TAKE 1 CAPSULE BY MOUTH 2 TIMES A DAY 11/30/22   Jennifer Couch MD   lisinopril (PRINIVIL;ZESTRIL) 40 MG tablet TAKE 1 TABLET BY MOUTH ONE TIME A DAY 11/30/22   Jennifer Amelia, MD   Prodigy Lancets 28G MISC 100 Lancet by Does not apply route three times daily 11/30/22   Jennifer Couch MD   methocarbamol (ROBAXIN) 750 MG tablet  11/8/22   Historical Provider, MD   Prodigy Lancets 28G MISC 1 Bottle by Does not apply route three times daily 11/20/22   Jamarcustis President,    magnesium oxide (MAG-OX) 400 MG tablet Take 1 tablet by mouth daily 11/10/22   Mo'Rosanne Cast MD   gabapentin (NEURONTIN) 400 MG capsule Take 1 capsule by mouth 3 times daily for 30 days.  11/9/22 12/9/22  Mo'Men K Rose Marti MD   ASPIRIN LOW DOSE 81 MG EC tablet TAKE 1 TABLET BY MOUTH ONE TIME A DAY 11/8/22   Yumiko Sosa MD   busPIRone (BUSPAR) 10 MG tablet Take 10 mg by mouth in the morning and 10 mg in the evening.     Historical Provider, MD   celecoxib (CELEBREX) 100 MG capsule Take 100 mg by mouth 2 times daily    Historical Provider, MD   glipiZIDE (GLUCOTROL) 10 MG tablet Take 10 mg by mouth 2 times daily (before meals)    Historical Provider, MD   furosemide (LASIX) 20 MG tablet Take 20 mg by mouth daily    Historical Provider, MD   bimatoprost (LUMIGAN) 0.01 % SOLN ophthalmic drops Place 1 drop into both eyes nightly    Historical Provider, MD   omeprazole (PRILOSEC) 20 MG delayed release capsule Take 20 mg by mouth daily    Historical Provider, MD   rOPINIRole (REQUIP) 2 MG tablet Take 2 mg by mouth nightly    Historical Provider, MD   atorvastatin (LIPITOR) 40 MG tablet Take 40 mg by mouth nightly    Historical Provider, MD   sertraline (ZOLOFT) 50 MG tablet Take 50 mg by mouth daily    Historical Provider, MD   Empagliflozin-metFORMIN HCl (SYNJARDY) 5-1000 MG TABS Take 1,000 mg by mouth 2 times daily (before meals)    Historical Provider, MD   Dulaglutide (TRULICITY) 1.5 WB/3.0ZS SOPN Inject 1.5 mg into the skin once a week    Historical Provider, MD   albuterol sulfate HFA (PROVENTIL;VENTOLIN;PROAIR) 108 (90 Base) MCG/ACT inhaler Inhale 2 puffs into the lungs every 6 hours as needed for Wheezing    Historical Provider, MD   diclofenac sodium (VOLTAREN) 1 % GEL Apply 2 g topically as needed for Pain    Historical Provider, MD   docusate sodium (COLACE) 100 MG capsule Take 100 mg by mouth 2 times daily as needed for Constipation    Historical Provider, MD   cyclobenzaprine (FLEXERIL) 10 MG tablet Take 10 mg by mouth 3 times daily as needed for Muscle spasms    Historical Provider, MD   rOPINIRole (REQUIP) 2 MG tablet  8/29/22   Historical Provider, MD   TRULICITY 1.5 IG/5.8QG SOPN INJECT THE CONTENTS OF ONE SYRINGE UNDER THE SKIN ONCE WEEKLY  Patient taking differently: Inject 1.5 mg into the skin once a week Saturdays 9/21/22   Ashwini Booth MD   celecoxib (CELEBREX) 100 MG capsule TAKE 1 CAPSULE BY MOUTH 2 TIMES A DAY 9/8/22   Algernon Felty, MD   SYNJARDY 5-1000 MG TABS TAKE 1 TABLET BY MOUTH 2 TIMES A DAY 8/30/22   Ashwini Booth MD   busPIRone (BUSPAR) 10 MG tablet TAKE 1 TABLET BY MOUTH 3 TIMES A DAY 8/23/22   Sangeeta Gonzalez MD   Elastic Bandages & Supports (ELBOW BRACE) MISC 1 each by Does not apply route at bedtime Please dispense left cubital tunnel brace 8/18/22 11/16/22  ORLY Christina - CNP   alendronate (FOSAMAX) 70 MG tablet Take 1 tablet by mouth every 7 days  Patient taking differently: Take 70 mg by mouth every 7 days Wednesdays 7/29/22   Godfrey Emanuel MD   oxyCODONE-acetaminophen (PERCOCET) 5-325 MG per tablet Take by mouth every 4 hours as needed for Pain. Historical Provider, MD   atorvastatin (LIPITOR) 40 MG tablet Take 1 tablet by mouth at bedtime 7/15/22   Maxime Newman MD   furosemide (LASIX) 20 MG tablet Take 0.5 tablets by mouth every other day 7/15/22   Maxime Newman MD   blood glucose test strips (PRODIGY NO CODING BLOOD GLUC) strip Use to test once daily and as needed as directed by provider. Please dispense Prodigy brand. 7/12/22   Ashwini Booth MD   blood glucose monitor kit and supplies Dispense sufficient amount for indicated testing frequency plus additional to accommodate PRN testing needs. Dispense all needed supplies to include: monitor, strips, lancing device, lancets, control solutions, alcohol swabs.    Prodigy Brand 7/6/22   Ashwini Booth MD   albuterol sulfate HFA (PROVENTIL HFA) 108 (90 Base) MCG/ACT inhaler Inhale 2 puffs into the lungs every 4 hours as needed for Wheezing 4/20/21 4/20/22  Ashwini Booth MD   repaglinide (PRANDIN) 1 MG tablet TAKE 1 TABLET BY MOUTH 3 TIMES A DAY BEFORE MEALS 12/8/20   Ashwini Booth MD   bimatoprost (LUMIGAN) 0.01 % SOLN ophthalmic drops Place 1 drop into both eyes nightly 4/26/18   Historical Provider, MD   Blood Glucose Monitoring Suppl (PRODIGY AUTOCODE BLOOD GLUCOSE) w/Device KIT 1 Device by Does not apply route 3 times daily 4/26/19   Ulysses Rave, MD       Future Appointments   Date Time Provider Nicola Gabrieli   12/8/2022 10:00 AM ORLY Ortega - CNP [facility]   12/28/2022  3:45 PM [facility], MD [facility]   1/20/2023 10:30 AM DO Artemio Rodriguez   2/15/2023  3:30 PM Omkar Olvera MD Neuro [facility] -

## 2022-12-08 ENCOUNTER — HOSPITAL ENCOUNTER (OUTPATIENT)
Dept: OCCUPATIONAL THERAPY | Age: 62
Setting detail: THERAPIES SERIES
Discharge: HOME | End: 2022-12-08
Payer: COMMERCIAL

## 2022-12-08 ENCOUNTER — OFFICE VISIT (OUTPATIENT)
Dept: NEUROSURGERY | Age: 62
End: 2022-12-08

## 2022-12-08 VITALS
HEIGHT: 63 IN | HEART RATE: 95 BPM | SYSTOLIC BLOOD PRESSURE: 110 MMHG | OXYGEN SATURATION: 97 % | DIASTOLIC BLOOD PRESSURE: 73 MMHG | WEIGHT: 180 LBS | BODY MASS INDEX: 31.89 KG/M2

## 2022-12-08 DIAGNOSIS — G56.22 ULNAR NEUROPATHY AT ELBOW, LEFT: Primary | ICD-10-CM

## 2022-12-08 DIAGNOSIS — Z98.890 S/P DECOMPRESSION OF ULNAR NERVE AT ELBOW: ICD-10-CM

## 2022-12-08 PROCEDURE — 97110 THERAPEUTIC EXERCISES: CPT

## 2022-12-08 PROCEDURE — 97166 OT EVAL MOD COMPLEX 45 MIN: CPT

## 2022-12-08 PROCEDURE — 99024 POSTOP FOLLOW-UP VISIT: CPT | Performed by: NURSE PRACTITIONER

## 2022-12-08 RX ORDER — BUSPIRONE HYDROCHLORIDE 10 MG/1
TABLET ORAL
Qty: 180 TABLET | Refills: 0 | Status: SHIPPED | OUTPATIENT
Start: 2022-12-08

## 2022-12-08 ASSESSMENT — PAIN DESCRIPTION - LOCATION: LOCATION: ARM

## 2022-12-08 ASSESSMENT — 9 HOLE PEG TEST
TEST_RESULT: IMPAIRED
TESTTIME_SECONDS: 39.44
TESTTIME_SECONDS: 27.18
TEST_RESULT: IMPAIRED

## 2022-12-08 ASSESSMENT — PAIN SCALES - GENERAL: PAINLEVEL_OUTOF10: 6

## 2022-12-08 ASSESSMENT — PAIN DESCRIPTION - DESCRIPTORS: DESCRIPTORS: TINGLING;NUMBNESS;SORE

## 2022-12-08 ASSESSMENT — PAIN DESCRIPTION - FREQUENCY: FREQUENCY: CONTINUOUS

## 2022-12-08 ASSESSMENT — PAIN DESCRIPTION - ORIENTATION: ORIENTATION: LEFT

## 2022-12-08 NOTE — PROGRESS NOTES
[address]  [facility] [address], [address] [facility] [address]  Dept: [phone]    Patient:  Sunny Poe  YOB: 1960  Date: 12/8/22    The patient is a 65 y.o. female who presents today for consult of the following problems:     Chief Complaint   Patient presents with    Post-Op Check     8 week post op          HPI:     Sunny Poe is a 65 y.o. female who presents to the office for post-op evaluation s/p left cubital tunnel release. Incision healing well. Denies any discharge, drainage, fevers, chills. Still with decreased sensation and weakness to left hand in ulnar distribution. Postop pain well controlled. Incision has healed well  Decreased sensation persists in ulnar distribution  Intrinsics/hand grasp left hand 4/5, otherwise upper extremities 5/5    Date of surgery: 11/23/2022    Assessment and Plan:      1. Ulnar neuropathy at elbow, left    2. S/P decompression of ulnar nerve at elbow          Plan: Incision healing well. Postop pain well controlled. Referral provided for initiation of occupational therapy. Patient to return in January for next postop visit as planned    Followup: Return in about 6 weeks (around 1/19/2023), or if symptoms worsen or fail to improve. Prescriptions Ordered:  No orders of the defined types were placed in this encounter. Orders Placed:  Orders Placed This Encounter   Procedures    [facility]     Referral Priority:   Routine     Referral Type:   Eval and Treat     Referral Reason:   Specialty Services Required     Requested Specialty:   Occupational Therapy     Number of Visits Requested:   1        Electronically signed by ORLY Black CNP on 12/8/2022 at 1:20 PM    Please note that this chart was generated using voice recognition Dragon dictation software.   Although every effort was made to ensure the accuracy of this automated transcription, some errors in transcription may have occurred.

## 2022-12-08 NOTE — PROGRESS NOTES
Occupational Therapy: Initial Evaluation   Patient: Karl Thacker (14 y.o. female)   Examination Date:   Plan of Care Certification Period: 2022 to    Progress Note Counter: MD appt in 2023   :  1960  MRN: [id number]  CSN: [id number]   Insurance: Payor: Steve Reyes / Plan: [employer] [address] / Product Type: *No Product type* /   Insurance ID: [id number] - ([place] [employer]BS) Secondary Insurance (if applicable): Insurance Information: [employer] (pt has 10 visits left on her insurance till the end of the year).    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] Ulnar neuropathy at elbow lt (G56.22 -ICD-10 code)  Treatment Diagnosis: lt UE/hand: weakness , stiffness (shoulder, wrist,hand), impaired coordinaiton, pain, impaired sensation  (ICD-10 code: B85.12,M63.070, M25.632,M25.642, R27.9, M79.602, R20.2)         PERTINENT MEDICAL HISTORY           Medical History:     Past Medical History:   Diagnosis Date    Cervical spondylosis 2009    c-4 c-5, c-5 c-6, c-6 c-7    CKD (chronic kidney disease)     per pt, does not have nephrologist    COVID-19 2021    nasal congestion, fatique and myalgia x 3 weeks    Fall     PATIENT FELL 2 WEEKS AGO L[place]ED ON [employer]    Generalized anxiety disorder 2020    GERD (gastroesophageal reflux disease)     History of recent hospitalization 2022    til 10/9/22 at [facility]. V's    History of swelling of feet     lasix QOD for this    Hyperlipidemia     Hypertension     Left hand weakness     and pain, can not make fist d/t cubital tunnel issue    Lumbar radiculopathy 11/10/2021    was following with pain management and has had epidural injections    DIEGO (nonalcoholic steatohepatitis) 10/12/2014    Obesity     Osteoarthritis of left knee 2014 Recurrent major depressive disorder ([facility] [address] 07/15/2022    Restless legs syndrome     Snores 06/06/2022    was seen by Dr. Jose Roberto Jackson for likely sleep apnea while hospitalized , to follow up as OP for sleep study, has not been done    TIA (transient ischemic attack) 2005    no residual symptoms    Type II or unspecified type diabetes mellitus without mention of complication, not stated as uncontrolled     Wears glasses     Wellness examination     PCP, Dr. Glenda Leonardo, last seen     Surgical History:   Past Surgical History:   Procedure Laterality Date    CARPAL TUNNEL RELEASE Left 11/23/2022    CUBITAL TUNNEL DECOMPRESSION performed by Larry Arevalo DO at [address]      x2    COLONOSCOPY  09/17/2012    ?  polyps per pt    HYSTERECTOMY, TOTAL ABDOMINAL (CERVIX REMOVED)  2008    with BSO    OTHER SURGICAL HISTORY Left 11/23/2022    cubital tunnel release    SHOULDER SURGERY Left 02/17/2021    SHOULDER MANIPULATION WITH PRE OP INTERSCALENE BLOCK and intraop injection performed by Devang Patel DO at [facility] Left 10/03/2022    S1 nerve root injection, Dr. Scottie Drummnod Left 10/06/2022    S1 NERVE ROOT INJECTION performed by Tyra Sosa MD at [facility]      as child    UPPER GASTROINTESTINAL ENDOSCOPY  07/23/2012    [facility]       Medications:   Current Outpatient Medications:     busPIRone (BUSPAR) 10 MG tablet, TAKE 1 TABLET BY MOUTH 3 TIMES A DAY, Disp: 180 tablet, Rfl: 0    amoxicillin (AMOXIL) 500 MG capsule, Take 1 capsule by mouth 2 times daily for 7 days, Disp: 14 capsule, Rfl: 0    benzonatate (TESSALON PERLES) 100 MG capsule, Take 1 capsule by mouth every 6 hours as needed for Cough, Disp: 28 capsule, Rfl: 2    glipiZIDE (GLUCOTROL) 10 MG tablet, TAKE 1 TABLET BY MOUTH TWO TIMES A DAY, Disp: 180 tablet, Rfl: 0    sertraline (ZOLOFT) 50 MG tablet, TAKE ONE TABLET BY MOUTH EVERY MORNING, Disp: 30 tablet, Rfl: 0    omeprazole (PRILOSEC) 20 MG delayed release capsule, TAKE 1 CAPSULE BY MOUTH 2 TIMES A DAY, Disp: 180 capsule, Rfl: 2    lisinopril (PRINIVIL;ZESTRIL) 40 MG tablet, TAKE 1 TABLET BY MOUTH ONE TIME A DAY, Disp: 30 tablet, Rfl: 2    Prodigy Lancets 28G MISC, 100 Lancet by Does not apply route three times daily, Disp: 100 each, Rfl: 3    methocarbamol (ROBAXIN) 750 MG tablet, , Disp: , Rfl:     Prodigy Lancets 28G MISC, 1 Bottle by Does not apply route three times daily, Disp: 100 each, Rfl: 3    magnesium oxide (MAG-OX) 400 MG tablet, Take 1 tablet by mouth daily, Disp: 30 tablet, Rfl: 3    gabapentin (NEURONTIN) 400 MG capsule, Take 1 capsule by mouth 3 times daily for 30 days. , Disp: 90 capsule, Rfl: 0    ASPIRIN LOW DOSE 81 MG EC tablet, TAKE 1 TABLET BY MOUTH ONE TIME A DAY, Disp: 30 tablet, Rfl: 2    celecoxib (CELEBREX) 100 MG capsule, Take 100 mg by mouth 2 times daily, Disp: , Rfl:     glipiZIDE (GLUCOTROL) 10 MG tablet, Take 10 mg by mouth 2 times daily (before meals), Disp: , Rfl:     furosemide (LASIX) 20 MG tablet, Take 20 mg by mouth daily, Disp: , Rfl:     bimatoprost (LUMIGAN) 0.01 % SOLN ophthalmic drops, Place 1 drop into both eyes nightly, Disp: , Rfl:     omeprazole (PRILOSEC) 20 MG delayed release capsule, Take 20 mg by mouth daily, Disp: , Rfl:     rOPINIRole (REQUIP) 2 MG tablet, Take 2 mg by mouth nightly, Disp: , Rfl:     atorvastatin (LIPITOR) 40 MG tablet, Take 40 mg by mouth nightly, Disp: , Rfl:     sertraline (ZOLOFT) 50 MG tablet, Take 50 mg by mouth daily, Disp: , Rfl:     Empagliflozin-metFORMIN HCl (SYNJARDY) 5-1000 MG TABS, Take 1,000 mg by mouth 2 times daily (before meals), Disp: , Rfl:     Dulaglutide (TRULICITY) 1.5 ST/3.6FY SOPN, Inject 1.5 mg into the skin once a week, Disp: , Rfl:     albuterol sulfate HFA (PROVENTIL;VENTOLIN;PROAIR) 108 (90 Base) MCG/ACT inhaler, Inhale 2 puffs into the lungs every 6 hours as needed for Wheezing, Disp: , Rfl:     diclofenac sodium (VOLTAREN) 1 % GEL, Apply 2 g topically as needed for Pain, Disp: , Rfl:     docusate sodium (COLACE) 100 MG capsule, Take 100 mg by mouth 2 times daily as needed for Constipation, Disp: , Rfl:     cyclobenzaprine (FLEXERIL) 10 MG tablet, Take 10 mg by mouth 3 times daily as needed for Muscle spasms, Disp: , Rfl:     rOPINIRole (REQUIP) 2 MG tablet, , Disp: , Rfl:     TRULICITY 1.5 RH/2.2LY SOPN, INJECT THE CONTENTS OF ONE SYRINGE UNDER THE SKIN ONCE WEEKLY (Patient taking differently: Inject 1.5 mg into the skin once a week Saturdays), Disp: 6 mL, Rfl: 2    celecoxib (CELEBREX) 100 MG capsule, TAKE 1 CAPSULE BY MOUTH 2 TIMES A DAY, Disp: 180 capsule, Rfl: 1    SYNJARDY 5-1000 MG TABS, TAKE 1 TABLET BY MOUTH 2 TIMES A DAY, Disp: 180 tablet, Rfl: 2    Elastic Bandages & Supports (ELBOW BRACE) MISC, 1 each by Does not apply route at bedtime Please dispense left cubital tunnel brace, Disp: 1 each, Rfl: 0    alendronate (FOSAMAX) 70 MG tablet, Take 1 tablet by mouth every 7 days (Patient taking differently: Take 70 mg by mouth every 7 days Wednesdays), Disp: 12 tablet, Rfl: 1    oxyCODONE-acetaminophen (PERCOCET) 5-325 MG per tablet, Take by mouth every 4 hours as needed for Pain., Disp: , Rfl:     atorvastatin (LIPITOR) 40 MG tablet, Take 1 tablet by mouth at bedtime, Disp: 90 tablet, Rfl: 2    furosemide (LASIX) 20 MG tablet, Take 0.5 tablets by mouth every other day, Disp: 60 tablet, Rfl: 3    blood glucose test strips (PRODIGY NO CODING BLOOD GLUC) strip, Use to test once daily and as needed as directed by provider. Please dispense Prodigy brand. , Disp: 100 each, Rfl: 3    blood glucose monitor kit and supplies, Dispense sufficient amount for indicated testing frequency plus additional to accommodate PRN testing needs. Dispense all needed supplies to include: monitor, strips, lancing device, lancets, control solutions, alcohol swabs.   Prodigy Brand, Disp: 1 kit, Rfl: 0    albuterol sulfate HFA (PROVENTIL HFA) 108 (90 Base) MCG/ACT inhaler, Inhale 2 puffs into the lungs every 4 hours as needed for Wheezing, Disp: 2 Inhaler, Rfl: 5    bimatoprost (LUMIGAN) 0.01 % SOLN ophthalmic drops, Place 1 drop into both eyes nightly, Disp: , Rfl:     Blood Glucose Monitoring Suppl (PRODIGY AUTOCODE BLOOD GLUCOSE) w/Device KIT, 1 Device by Does not apply route 3 times daily, Disp: 1 kit, Rfl: 0  Allergies: Codeine      SUBJECTIVE EXAMINATION     Subjective History: Onset Date:  (lt elbow sx 11-)  Subjective: Pt still has steri strips on lt elbow. Pt had sx lt elbow but hasn't had pain. Pt has numbness & tingling in lt hand/rt hand. .Pt states that the APRN-CNP said she was ok to come back to therapy with lift restriction 5# or less. Pt states she got piriformis injection 2 days before her lt elbow sx in November. Additional Pertinent Hx (if applicable): lt elbow cubital tunnel release 11-            Learning/Language: Learning  Does the patient/guardian have any barriers to learning?: No barriers     Pain Screening    Pain Screening  Patient Currently in Pain: Yes  Pain Assessment: 0-10  Pain Level: 6  Post Treatment Pain Level: 6 (lt arm pain at end of tx)  Patient's Stated Pain Goal: 0 - No pain  Pain Location: Arm (piriformis)  Pain Orientation: Left  Pain Descriptors: Tingling, Numbness, Sore (numbness & tingling lt hand.)  Pain Frequency: Continuous  Aggravating factors: Movement  Pain Management/Relieving Factors: Rest (Positioning lt arm on pillow)    Functional Status    Dominant Hand: : Right    Social History:    Social History  Lives With: Spouse  Type of Home: House  Home Equipment: Cane    Occupation/Interests:   Occupation:  (Pt currently off work.)  Type of Occupation: Infrascaleing. Pt states she can't do her job right now because of her arm. Job Duties:  (typing)    Prior Level of Function:   Independent with ADLS. Independent        Current Level of Function:   See ADL section. IADL Comments: See UEFI for details.   Receives Help From: Family (Spouse.)  ADL Assistance:  (Pt's spouse assist to hook her bra, wash her back, & wash her hair.)  Homemaking Assistance: Needs assistance (Pt's spouse assists.)  Ambulation Assistance: Independent (Pt uses cane.)  Transfer Assistance: Independent  Active : No  Patient's  Info: Pt last drove in May 2022. Pt's spouse drives. ADLs:   ADL  Feeding: Independent  Grooming: Minimal assistance, Increased time to complete, Modified independent  (Pt states she brushes her hair with lt hand & uses hair dryer with rt hand. Pt's spouse washes her hair.)  UE Bathing: Modified independent  (Pt's spouse washes her back.)  LE Bathing: Modified independent   UE Dressing: Minimal assistance, Modified independent  (Pt gets assist for bra.)  LE Dressing: Modified independent     OBJECTIVE EXAMINATION   Restrictions:         Position Activity Restriction  Other position/activity restrictions: Per pt lifting restriction 5# or  less. Review of Systems (including Visual Assessment if applicable):  Vision  Vision: Impaired  Visual Deficits: Wears glasses  Hearing  Hearing: Exceptions to [facility]  Hearing Exceptions: Hard of hearing/hearing concerns (Pt states she is a little hard of hearing.)    Left AROM  Right AROM      LUE AROM (degrees)  L Shoulder Flexion 0-180: 150  L Shoulder ABduction 0-180: 140  L Shoulder Int Rotation  0-70: 79  L Shoulder Ext Rotation  0-90: 63  L Elbow Flexion 0-145: 0  L Elbow Extension 145-0: 145  L Forearm Pron 0-90: wfls  L Forearm Supination  0-90: 87  L Wrist Flexion 0-80: 60  L Wrist Extension 0-70: 40  L Wrist Radial Deviation 0-20: 20  L Wrist Ulnar Deviation 0-45: 25  Left Hand AROM (degrees)  Left Hand General AROM: Flexion to [facility] : index  3cm, middle   3cm, ring  3 cm, little finger 3  cm. Pt needs to concentrate with lt hand flex/extension, & finger abduction/adduction. lt hand abd/adduction . L Thumb Opposition: Slow to all fingers.  RUE AROM (degrees)  RUE AROM : WFL       Left PROM  Right PROM      LUE PROM (degrees)  LUE General PROM: PROM lt UE (shoulder, elbow, forearm, & wrist wfls)  Left Hand PROM (degrees)  Left Hand PROM: WFL  Left Hand General PROM: Pt had pulling in lt elbow during PROM lt hand flex/extension. Twitching lt hand observed. Left Strength  Right Strength      LUE Strength  LUE Strength Comment: TBA - pt s/p lt cubital tunnel release 11/23/22. Lt hand grasp - weak. RUE Strength  RUE Strength Comment: Rt hand grasp - weak. Hand Dominance: Right   Left  Right     Strength  Handle Setting 2: TBA - pt  2 wk 2 days since sx  Trial 2:  (TBA) Handle Setting 2: TBA   Pinch Strength (if applicable) Left Hand Strength - Lateral Pinch (lbs)  Trial 2:  (TBA)  Left Hand Strength - John (lbs)  Trial 2:  (TBA)     Fine Motor Skills:   Fine Motor Skills  Left 9-Hole Peg Test: Impaired (Pt below the 10th percentile for norms)  Left 9 Hole Peg Test Time (secs): 39.44  Right 9-Hole Peg Test: Impaired  Right 9 Hole Peg Test Time (secs): 27.18    12/08/22 1426   Tone   LUE Tone Normotonic   Coordination   Movements Are Fluid And Coordinated No   Coordination and Movement Description Fine motor impairments; Left UE;Decreased speed     Outcome Measure(s) Completed:   Upper Extremity Functional Index   Today, do you or would you have any difficulty at all with:   Any of your usual work, housework, or school activities[de-identified] Extreme difficulty or unable to perform activity  Your usual hobbies, re creational or sporting activities[de-identified] Extreme difficulty or unable to perform activity (exteme difficulty)  Lifting a bag of groceries to waist level[de-identified] Extreme difficulty or unable to perform activity (extreme difficulty)  Lifting a bag of groceries above your head[de-identified] Extreme difficulty or unable to perform activity (unable)  Grooming your hair[de-identified] Quite a bit of difficulty  Pushing up on your hands (eg from bathtub or chair):: Extreme difficulty or unable to perform activity (unable)  Preparing food (eg peeling, cutting):: Extreme difficulty or unable to perform activity (extreme to hold food with lt hand & cut with rt hand.)  Driving[de-identified] Extreme difficulty or unable to perform activity (Unable - pt hasn't driven since May.)  Vacuuming, sweeping or raking[de-identified] Extreme difficulty or unable to perform activity (vacuum Susy Maite - unable, now pt would use rt hand)  Dressing[de-identified] Moderate difficulty  Doing up buttons[de-identified] Moderate difficulty  Using tools or appliances[de-identified] Extreme difficulty or unable to perform activity (unable to use screw  with lt hand)  Opening doors[de-identified] Moderate difficulty  Cleaning[de-identified] Quite a bit of difficulty  Tying or lacing shoes[de-identified] A little bit of difficulty  Sleeping[de-identified] Quite a bit of difficulty (Pt sleeps in lazy boy recliner with lt arm resting on pillow)  Laundering clothes (eg washing, ironing, folding):: Moderate difficulty (Pt can get some clothes out of washer & put in dryer with assist lt hand)  Opening a jar[de-identified] Extreme difficulty or unable to perform activity (extreme)  Throwing a ball[de-identified] Moderate difficulty  Carrying a small suitcase with your affected limb[de-identified] Quite a bit of difficulty  UEFI Total Score: 17  UEFI Total Percentage: 21.25 %     Total Score (out of 80) - if applicable: 17   Current Percentage of Function: 21.25 % % (Date: 12/8/2022)    Interpretation of Score: The final UEFI score ranges between 0 and 80 points. Scores closer to 0 indicate severe limitation whilst scores closer to 80 indicate very little to no limitation. The significant change (Sola Earnestine [address] 112) between two subsequent evaluations is set at 9 points. Higher Score indicates less disability, more function. ASSESSMENT     Impression: Assessment: Pt presents with difficulty flexing fingers of lt hand not being able to make a fist & difficulty reaching with lt shoulder. Pt has steri strips lt elbow. Pt's lt hand strength/grasp is weak. Pt's lt hand motor coordination is slow.  At the end of session with pt doing rom exercises she pain lt arm. Pt receptive to doing HEP. OT reminded pt that since her lt cubital tunnel sx (its only been a few weeks)she has to gain back rom,coordination, & strength in lt UE & it will take time. Pt current UEFI total score is 17 out of total 80 points. Pt will benefit from skilled therapy to provide therapeutic exercises (49628),therapeutic [id number](80451), ulnar nerve gliding, to facilitate lt UE motion,coordination for daily activities, light lADLs. Progress to scar massage & light strengthening when appropriate. Performance Deficits/Impairments: Decreased ROM, Decreased strength, Decreased fine motor control, Decreased high-level IADLs, Decreased sensation    Statement of Medical Necessity: Occupational Therapy is both indicated and medically necessary as outlined in the POC to increase the likelihood of meeting the functionally related goals stated below. Patient's Activity Tolerance: Patient tolerated treatment well (Increase lt arm pain.)      Patient's rehabilitation potential/prognosis is considered to be: Good    Factors which may impact rehabilitation potential include: None        GOALS     Patient Goal(s): Patient goals : To be able to move lt little finger normally, make fist, type, go back to work  Short Term Goals Completed by 5 visits Goal Status   Pt will demo & report modified independence with lt UE HEP     Pt will demo improved lt hand (index,middle,ring, little)  AROM (flexion to [facility] ) by 1cm to improve grasp objects 1/2\" to 1\" diameter.      Increase AROM lt UE: shoulder (flexion, abduction, ER) by 5, wrist extension by 5-10 to improve [facility]     Pt will demo improved lt hand dexterity by completing 9 hole peg test 5 seconds quicker than eval.                                             Long Term Goals Completed by 10 visits Goal Status   Using the UEFI  pt will report less difficulty using lt UE for ADL/light IADLs ( getting dressed, hair grooming, button manipulation, laundry, opening jars) & increase in her total  UEFI score by 10 points     Compared to eval pt will complete 9 hole peg test 10 seconds quicker using lt hand. Pt will reports less difficulty with fine motor ADLs (hooking her bra)     Increase lt hand AROM & grasp -compared to eval  pt will flex fingers 2cm better. Assess lt hand strength & upgrade HEP . Increase AROM lt shoulder by 10 (flexion, abduction ) for reaching during daily household activities. TREATMENT PLAN       REQUIRES OT FOLLOW-UP: Yes  Type: Outpatient  Treatment Initiated : See OT exercises for details. Additional Comments: continue OT    Pt. and/or family actively involved in establishing Plan of Care and Goals: Yes   Patient/ Caregiver education and instruction: Plan of Care, OT Role, Home Exercise Program Patient Education: Pt familiar with OT from having therapy at our facility. OT instructed pt in HEP lt UE AROM exercises( finger wall walking, finger walking on table(with pt standing), shoulder flex/extension, elbow flex/extension,wrist RD/UD, wrist flex/extension). OT added towel on table exercises making circles cw /ccw to her current HEP. Pt correctly demo exercises today. Handouts issued to pt & copy put in chart. Treatment may include any combination of the following: Current Treatment Recommendations: ROM, Patient/Caregiver education & training, Coordination training (when appropriate progress to light strengthening)     Frequency / Duration:  Patient to be seen 2-3x/week for 10 visits (pt has 10 visits left on insurance for this year)       Eval Complexity:   Decision Making: Medium Complexity  Occupational Profile/History : Medium  History: 5-30-2022Pt was trying to pull a drawer when she got a sharp pain in back. Pt went to ED where pt reported that she saw with arms crossed on the bed leaning on it for \"days\".  After sitting in that position pt reported the numbness & tingling in bilateral UE began. Assessment(s) that identifies: Medium  Exam: 5 performance deficits  Assistance Modification: Medium  Assistance / Modification: See UEFI for details. OT Treatment Completed:  Exercises:     Treatment Reasoning    OT Exercise 1: PROM lt shoulder elbow ,wrist, hand. ,Lt hand finger blocking exercises 10x each (PIP flex/extension, DiP flex/extension )  OT Exercise 2: HEP instruction : towel on table 10x  lt UE circles cw & ccw (pt already doing reach forward/back, side to side with towel on table at home. ) . AROM lt UE (shoulder, elbow, wrist), finger wall walking      Limitations addressed: Coordination, Flexibility, Strength (ROM)  Limitations addressed: lt UE rom, coordination  Therapist provided: Verbal cuing                     Therapy Time  Individual Time In: 1426  Individual Time Out: 1540  Minutes: 74  Timed Code Treatment Minutes: 30 Minutes     Therapist Signature: June Son OT    Date: 45/0/7797     I certify that the above Occupational Therapy Services are being furnished while the patient is under my care. I agree with the treatment plan and certify that this therapy is necessary. Physician's Signature:  ___________________________   Date:_______                                                                   Izabel Aguillon MD        Physician Comments: _______________________________________________    Please sign and return to [address] [place] [address]. Please fax to the location listed below.  [facility] YOU for this referral!    [address] [facility]  [address], [address] [facility]  Dept: [phone]  Dept Fax: [phone]  Loc: [phone]       POC NOTE      Treatment Charges: Mins Units Time In/Out   [] Evaluation       []  Low       [x]  Moderate       []  High 44 1 14:26-15:10   []  Electrical Stim      []  Iontophoresis      []  Paraffin      []  Ultrasound        []  Masage      [] Neuromuscular Re-ed      []  ADL      [x]  Ther Exercise 30 2 15:10-15:40   []  Ther Activities      []  Neuro Re-Ed      []  Splinting      []  Other      Total time 74min

## 2022-12-08 NOTE — TELEPHONE ENCOUNTER
Last visit: 12/1/22  Last Med refill: 8/24/22  Does patient have enough medication for 72 hours: Yes    Next Visit Date:  Future Appointments   Date Time Provider Nicola Zaidi   12/8/2022  2:15 PM Kelsiemary alice Kyle, OT [facility]   12/9/2022 10:15 AM Constanza Hsu, OT [facility]y   12/28/2022  3:45 PM Yareli Ricks MD [facility]   1/20/2023 10:30 AM Alfonso Calles DO [facility]   2/15/2023  3:30 PM Omkar Johns MD Neuro [facility] Neurology -       Health Maintenance   Topic Date Due    Shingles vaccine (1 of 2) Never done    Diabetic retinal exam  11/01/2017    Diabetic foot exam  12/01/2017    COVID-19 Vaccine (3 - Booster for Birtha Asper series) 04/08/2021    Lipids  06/24/2022    Breast cancer screen  11/14/2022    Depression Monitoring  05/26/2023    Diabetic microalbuminuria test  09/22/2023    A1C test (Diabetic or Prediabetic)  11/08/2023    Colorectal Cancer Screen  11/22/2025    DTaP/Tdap/Td vaccine (3 - Td or Tdap) 08/25/2032    Flu vaccine  Completed    Pneumococcal 0-64 years Vaccine  Completed    Hepatitis C screen  Completed    HIV screen  Completed    Hepatitis A vaccine  Aged Out    Hib vaccine  Aged Out    Meningococcal (ACWY) vaccine  Aged Out       Hemoglobin A1C (%)   Date Value   11/08/2022 7.7   08/25/2022 6.9   05/26/2022 7.9             ( goal A1C is < 7)   Microalb/Crt.  Ratio (mcg/mg creat)   Date Value   09/22/2022 Can not be calculated     LDL Cholesterol (mg/dL)   Date Value   06/24/2021 52   04/20/2021 48       (goal LDL is <100)   AST (U/L)   Date Value   10/04/2022 35 (H)     ALT (U/L)   Date Value   10/04/2022 58 (H)     BUN (mg/dL)   Date Value   10/19/2022 33 (H)     BP Readings from Last 3 Encounters:   12/08/22 110/73   11/23/22 128/84   11/22/22 124/79          (goal 120/80)    All Future Testing planned in [facility]  Lab Frequency Next Occurrence   Saline lock IV Once 05/10/2022   Lipid Panel Once 06/26/2022   Comprehensive Metabolic Panel Once 19/40/0094   Vitamin D 25 Hydroxy Once 33/57/7411   Basic Metabolic Panel Once 08/18/3729   CBC with Auto Differential Once 12/02/2022   Magnesium Once 12/02/2022   Phosphorus Once 12/02/2022   Urinalysis with Microscopic Once 12/02/2022   Vitamin D 25 Hydroxy Once 12/02/2022               Patient Active Problem List:     Cervical spondylosis     Type 2 diabetes mellitus without complication, without long-term current use of insulin (HCC)     Hypertension     Abnormal auditory perception     Nasal polyps     Osteoarthritis of left knee     Osteoarthritis of right knee     DIEGO (nonalcoholic steatohepatitis)     Vitamin D deficiency     Iron deficiency anemia     Dyslipidemia     Trochanteric bursitis     Chronic left shoulder pain     Restless legs syndrome     Generalized anxiety disorder     Toxic metabolic encephalopathy     Class 2 obesity in adult     Leg edema     Recurrent major depressive disorder (HCC)     Dermatitis     Retrolisthesis of vertebrae     Bilateral carpal tunnel syndrome     Intention tremor     Sciatica     Back pain with radiation     Left sciatic nerve pain     Lumbar disc disease with radiculopathy     Intractable back pain     Piriformis syndrome of left side     Ulnar neuropathy at elbow, left     S/P carpal tunnel release

## 2022-12-09 ENCOUNTER — HOSPITAL ENCOUNTER (OUTPATIENT)
Dept: OCCUPATIONAL THERAPY | Age: 62
Setting detail: THERAPIES SERIES
Discharge: HOME | End: 2022-12-09
Payer: COMMERCIAL

## 2022-12-09 ENCOUNTER — TELEPHONE (OUTPATIENT)
Dept: NEUROSURGERY | Age: 62
End: 2022-12-09

## 2022-12-09 RX ORDER — LANOLIN ALCOHOL/MO/W.PET/CERES
CREAM (GRAM) TOPICAL
Qty: 30 TABLET | Refills: 3 | OUTPATIENT
Start: 2022-12-09

## 2022-12-09 NOTE — TELEPHONE ENCOUNTER
[facility] called in regards of the patient's recent refill denial of Magnesium Oxide. The refill denial statement says, \"refill not appropriate. \" So, the pharmacy would like to give the patient a better explanation as to why the request was denied. Questions from the pharmacy: \"Is it discontinued? \"     \"Is the patient no longer supposed take the medication? \"     \"Does the patient need to be seen in the office in order to receive the refill? \"

## 2022-12-12 ENCOUNTER — HOSPITAL ENCOUNTER (OUTPATIENT)
Dept: OCCUPATIONAL THERAPY | Age: 62
Setting detail: THERAPIES SERIES
Discharge: HOME | End: 2022-12-12
Payer: COMMERCIAL

## 2022-12-12 PROCEDURE — 97110 THERAPEUTIC EXERCISES: CPT

## 2022-12-12 RX ORDER — ALBUTEROL SULFATE 90 UG/1
2 AEROSOL, METERED RESPIRATORY (INHALATION) EVERY 4 HOURS PRN
Qty: 2 EACH | Refills: 5 | Status: SHIPPED | OUTPATIENT
Start: 2022-12-12 | End: 2023-12-12

## 2022-12-12 RX ORDER — ONDANSETRON 4 MG/1
4 TABLET, FILM COATED ORAL DAILY PRN
Qty: 30 TABLET | Refills: 3 | Status: SHIPPED | OUTPATIENT
Start: 2022-12-12

## 2022-12-12 ASSESSMENT — PAIN DESCRIPTION - DESCRIPTORS: DESCRIPTORS: SORE

## 2022-12-12 ASSESSMENT — PAIN SCALES - GENERAL: PAINLEVEL_OUTOF10: 7

## 2022-12-12 ASSESSMENT — PAIN DESCRIPTION - LOCATION: LOCATION: LEG

## 2022-12-12 ASSESSMENT — PAIN DESCRIPTION - ORIENTATION: ORIENTATION: LEFT

## 2022-12-12 NOTE — PROGRESS NOTES
[address]         Phone: [phone]       Fax: [phone]     Occupational Therapy Daily Treatment note     Occupational Therapy: Daily Note   Patient: Iliana Blackwell (12 y.o. female)   Examination Date: 2022  No data recorded  Progress Note Counter: MD appt in 2023     :  1960 # of Visits since [facility]:   2   MRN: [id number]  CSN: [id number] Start of Care Date: 2022   Insurance: Payor: Reba GOMEZkamryncarlos 150 / Plan: [employer] [address] / Product Type: *No Product type* /   Insurance ID: [id number] - ([employer]) Secondary Insurance (if applicable): Insurance Information: [employer] (pt has 10 visits left on her insurance till the end of the year). Referring Physician: MD Myra Wise APRN-CNP   PCP: Laura Bhatia MD Visits to Date/Visits Approved: 2 / 10    No Show/Cancelled Appts:   /       Medical Diagnosis: Ulnar neuropathy at elbow, left [G56.22] Ulnar neuropathy at elbow lt (G56.22 -ICD-10 code)  Treatment Diagnosis: lt UE/hand: weakness , stiffness (shoulder, wrist,hand), impaired coordinaiton, pain, impaired sensation  (ICD-10 code: Q69.86,Z18.557, M25.632,M25.642, R27.9, M79.602, R20.2)        SUBJECTIVE EXAMINATION   Pain Level: Pain Screening  Patient Currently in Pain: Yes  Pain Assessment: 0-10  Pain Level: 7  Pain Location: Leg  Pain Orientation: Left  Pain Descriptors: Sore    Patient Comments: Subjective: Pt reports no lt arm pain. pt states she was sick last friday. Pt reports decrease sleeep and being tired. Pt c/to lt leg pain. HEP Compliance: good        OBJECTIVE EXAMINATION   Restrictions:    Position Activity Restriction  Other position/activity restrictions: Per pt lifting restriction 5# or  less. TREATMENT     Exercises:     Treatment Reasoning    OT Exercise 1: PROM lt shoulder elbow ,wrist, hand.  ,Lt hand finger blocking exercises 2 sets 20x each (PIP flex/extension, DiP flex/extension )  OT Exercise 3: lt hand based skateboard on table :15x each way (reaching forward/back, side to side, circles cw & ccw, figure 8)  OT Exercise 4: UE cane exercises 10x each way ( shoulder circles cw & ccw, shoulder horizontal abduction/adduction to rt/lt sides, elbow fle/extension )  OT Exercise 5: cone stacking (10 cones) using lt UE: stack & unstack (move cones from rt & lt side), pass cone over lt shoulder  OT Exercise 6: Ulnar nerve glide 2x10   Shoulder 90 w/ elbow flex/ext  lt  OT Exercise 7: yellow 1.5# digiflex: lt hand gripping 3 sets 10x  OT Exercise 8: easy  pegs with foam board: using lt hand place all 25 pegs in board, then remove pegs  OT Exercise 9: key pegs :using lt hand place all 25 in board, then remove pegs & hold in hand. OT Exercise 10: purple rom hoop (ht 22\"0 using lt UE moving rings across midline over & back  OT Exercise 11: yellow therapy ball on table 15x each way:forward/back, circles cw & ccw, side to side (pt standing)    Limitations addressed: Coordination, Flexibility, Strength  Limitations addressed: lt UE rom, coordination  Therapist provided: Verbal cuing  Progressed: Begun lt UE rom, coordination, & lt hand strengthening. ASSESSMENT     Assessment: Assessment: Pt reports no lt UE pain at beginning of tx but has pulling lt elbow reaching with cone stacking  & passive elbow initial flexion. Pt completes lt elbow ulnar nerve glides. Pt participates in therapeutic exercises for lt UE & hand gross/fine motor coordination, rom, & light hand strengthening. See OT exercises for detail. Pt reports lt arm tired at end of session.   Performance deficits / Impairments: Decreased ROM, Decreased strength, Decreased fine motor control, Decreased high-level IADLs, Decreased sensation    Post-Treatment Pain Level:      Prognosis: Good    Activity Tolerance: Patient tolerated treatment well TREATMENT PLAN   REQUIRES OT FOLLOW-UP: Yes  Type: Outpatient  Plan  Plan Frequency: 2-3x/week  Plan Weeks: 10 visits (pt has 10 visits left on insurance for this year)  Current Treatment Recommendations: ROM, Patient/Caregiver education & training, Coordination training (when appropriate progress to light strengthening)  Additional Comments: continue OT       Therapy Time  Individual Time In: 1134  Individual Time Out: 1218  Minutes: 44  Timed Code Treatment Minutes: 44 Minutes       Electronically signed by Navjot Moreno OT  on 12/12/2022 at 12:28 PM       Treatment Charges: Mins Units Time In/Out   [] Evaluation       []  Low       []  Moderate       []  High      []  Electrical Stim      []  Iontophoresis      []  Paraffin      []  Ultrasound        []  Masage      []  Neuromuscular Re-ed      []  ADL      [x]  Ther Exercise 44 3    []  Ther Activities      []  Neuro Re-Ed      []  Splinting      []  Other      Total Treatment time 44 min 3          POC NOTE

## 2022-12-13 RX ORDER — LANOLIN ALCOHOL/MO/W.PET/CERES
CREAM (GRAM) TOPICAL
Qty: 30 TABLET | Refills: 3 | OUTPATIENT
Start: 2022-12-13

## 2022-12-15 ENCOUNTER — HOSPITAL ENCOUNTER (OUTPATIENT)
Dept: OCCUPATIONAL THERAPY | Age: 62
Setting detail: THERAPIES SERIES
End: 2022-12-15
Payer: COMMERCIAL

## 2022-12-15 ENCOUNTER — CARE COORDINATION (OUTPATIENT)
Dept: OTHER | Facility: CLINIC | Age: 62
End: 2022-12-15

## 2022-12-15 ENCOUNTER — HOSPITAL ENCOUNTER (OUTPATIENT)
Dept: OCCUPATIONAL THERAPY | Age: 62
Setting detail: THERAPIES SERIES
Discharge: HOME | End: 2022-12-15
Payer: COMMERCIAL

## 2022-12-15 ENCOUNTER — HOSPITAL ENCOUNTER (OUTPATIENT)
Age: 62
Setting detail: SPECIMEN
Discharge: HOME | End: 2022-12-15

## 2022-12-15 DIAGNOSIS — E13.22 SECONDARY DIABETES MELLITUS WITH STAGE 2 CHRONIC KIDNEY DISEASE (GFR 60-89) (HCC): ICD-10-CM

## 2022-12-15 DIAGNOSIS — I12.9 BENIGN HYPERTENSION WITH CKD (CHRONIC KIDNEY DISEASE), STAGE II: ICD-10-CM

## 2022-12-15 DIAGNOSIS — E55.9 VITAMIN D DEFICIENCY: ICD-10-CM

## 2022-12-15 DIAGNOSIS — N18.2 BENIGN HYPERTENSION WITH CKD (CHRONIC KIDNEY DISEASE), STAGE II: ICD-10-CM

## 2022-12-15 DIAGNOSIS — N18.2 CKD (CHRONIC KIDNEY DISEASE), STAGE II: ICD-10-CM

## 2022-12-15 DIAGNOSIS — N18.2 SECONDARY DIABETES MELLITUS WITH STAGE 2 CHRONIC KIDNEY DISEASE (GFR 60-89) (HCC): ICD-10-CM

## 2022-12-15 LAB
ABSOLUTE EOS #: 0.62 K/UL (ref 0–0.44)
ABSOLUTE IMMATURE GRANULOCYTE: 0.05 K/UL (ref 0–0.3)
ABSOLUTE LYMPH #: 3.68 K/UL (ref 1.1–3.7)
ABSOLUTE MONO #: 1.04 K/UL (ref 0.1–1.2)
ANION GAP SERPL CALCULATED.3IONS-SCNC: 12 MMOL/L (ref 9–17)
BASOPHILS # BLD: 1 % (ref 0–2)
BASOPHILS ABSOLUTE: 0.08 K/UL (ref 0–0.2)
BUN BLDV-MCNC: 17 MG/DL (ref 8–23)
CALCIUM SERPL-MCNC: 9 MG/DL (ref 8.6–10.4)
CHLORIDE BLD-SCNC: 107 MMOL/L (ref 98–107)
CO2: 24 MMOL/L (ref 20–31)
CREAT SERPL-MCNC: 0.86 MG/DL (ref 0.5–0.9)
EOSINOPHILS RELATIVE PERCENT: 6 % (ref 1–4)
GFR SERPL CREATININE-BSD FRML MDRD: >60 ML/MIN/1.73M2
GLUCOSE BLD-MCNC: 67 MG/DL (ref 70–99)
HCT VFR BLD CALC: 40.5 % (ref 36.3–47.1)
HEMOGLOBIN: 12.1 G/DL (ref 11.9–15.1)
IMMATURE GRANULOCYTES: 0 %
LYMPHOCYTES # BLD: 33 % (ref 24–43)
MAGNESIUM: 1.7 MG/DL (ref 1.6–2.6)
MCH RBC QN AUTO: 28.1 PG (ref 25.2–33.5)
MCHC RBC AUTO-ENTMCNC: 29.9 G/DL (ref 28.4–34.8)
MCV RBC AUTO: 94.2 FL (ref 82.6–102.9)
MONOCYTES # BLD: 9 % (ref 3–12)
NRBC AUTOMATED: 0 PER 100 WBC
PDW BLD-RTO: 14.7 % (ref 11.8–14.4)
PHOSPHORUS: 3.7 MG/DL (ref 2.6–4.5)
PLATELET # BLD: 424 K/UL (ref 138–453)
PMV BLD AUTO: 10.7 FL (ref 8.1–13.5)
POTASSIUM SERPL-SCNC: 4.3 MMOL/L (ref 3.7–5.3)
RBC # BLD: 4.3 M/UL (ref 3.95–5.11)
RBC # BLD: ABNORMAL 10*6/UL
SEG NEUTROPHILS: 51 % (ref 36–65)
SEGMENTED NEUTROPHILS ABSOLUTE COUNT: 5.86 K/UL (ref 1.5–8.1)
SODIUM BLD-SCNC: 143 MMOL/L (ref 135–144)
WBC # BLD: 11.3 K/UL (ref 3.5–11.3)

## 2022-12-15 PROCEDURE — 97110 THERAPEUTIC EXERCISES: CPT

## 2022-12-15 NOTE — FLOWSHEET NOTE
[] Agapito Aly       Occupational Therapy            1st floor       [address], [place]         Phone: [phone]       Fax: [phone] [] [facility] [address] Occupational Therapy  [address]  [place], [place]  Phone: [phone]  Fax: [phone]      Occupational Therapy Daily Treatment Note    Date:  12/15/2022  Patient Name:  Jenn Andrew    :  1960  MRN: [id number]  Physician: MD Noelle Jameson APRN-CNP     Insurance: [employer] / Plan: Gifty Olivo Collin for 10 remaining visits  Diagnosis: Ulnar neuropathy at elbow, left [G56.22] Ulnar neuropathy at elbow lt (G56.22 -ICD-10 code)   Onset Date: 22   Next [address]: [address]  Visit# / total visits: 3/10; Progress note for Medicare patient due at visit 8-9    Cancels/No Shows: 1/0      Subjective:    Pain:  [] Yes  [x] No Location:  N/A Pain Rating: (0-10 scale) 0/10  Pain altered Tx:  [x] No  [] Yes  Action:  Pt Comments: Pt reports her elbow is \"coming along\". Pt reports having numbness and tingling in L hand and forearm. Objective:  Modalities:  Precautions:  Exercises:  EXERCISE    REPS/     TIME  WEIGHT/    LEVEL COMMENTS   Elbow AROM 3x10 AROM Flexion/extension   Pronation/supination 3x10 AROM Completed    Gripping 3x10  Digiflex (yellow)  Metal gripper (15lbs)   Resistive clips  2x6  Yellow (6), Red (6) 2 times    Foam pegs   Completed  Full board    Key pegs   Completed   Full board          Other:      Treatment Charges: Mins Units   []  Modalities:      []  Ultrasound     [x]  Ther Exercise 30 2   []  Manual Therapy     []  Ther Activities     []  Orthotic fit/train     []  Orthotic recheck     []  Other     Total Treatment time 30 mins 2         Assessment: [x] Progressing toward goals. Pt arrived to OT approximately 10 minutes late, adjusted OT session accordingly. Pt's incision is healing well, 2 sterri-strips fell off while pt rolled up her sleeve.  No visual sign of redness or concern for infection. Began tx w/ soft tissue mobilization to the L forearm and bicep. Cont all AROM and light  strengthening exercises. Pt tolerted tx well on this date w/ c/o 2/10 pn in L elbow after exercises. [] No change. [] Other     [x] Patient would continue to benefit from skilled occupational therapy services in order to: improve complaint of pain, numbness and tingling, improve strength for the completion of ADLs/IADLs    STG/LTG  Short Term Goals Completed by 5 visits  Pt will demo & report modified independence with lt UE HEP   Pt will demo improved lt hand (index,middle,ring, little)  AROM (flexion to [facility] ) by 1cm to improve grasp objects 1/2\" to 1\" diameter. Increase AROM lt UE: shoulder (flexion, abduction, ER) by 5, wrist extension by 5-10 to improve reach into ccabinets   Pt will demo improved lt hand dexterity by completing 9 hole peg test 5 seconds quicker than eval.      Long Term Goals Completed by 10 visits   Using the UEFI  pt will report less difficulty using lt UE for ADL/light IADLs ( getting dressed, hair grooming, button manipulation, laundry, opening jars) & increase in her total  UEFI score by 10 points   Compared to eval pt will complete 9 hole peg test 10 seconds quicker using lt hand. Pt will reports less difficulty with fine motor ADLs (hooking her bra)   Increase lt hand AROM & grasp -compared to eval  pt will flex fingers 2cm better. Assess lt hand strength & upgrade HEP . Increase AROM lt shoulder by 10 (flexion, abduction ) for reaching during daily household activities. Pt. Education:  [] Yes  [] No  [x] Reviewed Prior HEP/Ed  Method of Education: [] Verbal  [] Demo  [] Written  Re:  Comprehension of Education:  [] Verbalizes understanding. [] Demonstrates understanding. [] Needs review. [] Demonstrates/verbalizes HEP/Ed previously given. Plan: [x] Continue current frequency toward short and long term goals.   [x] Specific Instructions for subsequent treatments: Cont AROM exercises    [] Other:       Time In: 1540  Time Out: 3216        Electronically signed by:  YAAKOV Elder/CARMEN

## 2022-12-15 NOTE — CARE COORDINATION
Ambulatory Care Coordination Note  12/15/2022    ACC: Ronelle Rinne, RN    Ambulatory Care Manager [facility]) contacted the patient by telephone to follow up on progress, discuss new issues or concerns, and reinforce/ provide patient education. Verified name and  with patient as identifiers. Patient had a f/u appt with neurosurgery on 2022. Incision is healing well. OT started on 2022 for left arm. States she has weakness and working on this in OT. Patient also followed up with pain management. Her left leg is hurting all of the time still. Did not have much improvement after 2 injections in pyriformis muscle. State the doctor wants to try a different injection possibly before insurance terms on 2022. States office is waiting on EMG results from April to be sent to them. Patient states she called [facility] and requested records be sent to Dr. Niyah Cueva. Patient is going to call and follow up on this tomorrow. Patient states she had labs done today and will follow up with nephrology on 2022. Patient states she is having no issues with urination. Patient states she is still coughing and feels wiped out. She is coughing up \"white stuff\". Has no energy. She is wondering if she should follow up with PCP since it has been almost 3 weeks this has been going on. I advised patient to contact her PCP to see if she would need additional stronger antibiotics. Patient agreed with this. Reinforced/ Provided Education:  Discussed red flags and appropriate site of care based on symptoms and resources available to patient including: PCP  Specialist  Benefits related nurse triage line  Urgent care clinics  When to call [address] Importance and benefits of: Follow up with PCP and specialist, medication adherence, self monitoring and reporting of symptoms. Plan:  Continue weekly-biweekly outreaches to provide telephonic support, education and resources as needed.    Discuss / follow up on: Goal progress and OT progress, status of injection for pyriformis muscle, and status of cough. Pt verbalized understanding and is agreeable to follow up contact. Offered patient enrollment in the Remote Patient Monitoring (RPM) program for in-home monitoring: NA. Lab Results       None                 Goals Addressed                   This Visit's Progress     Conditions and Symptoms   On track     I will schedule office visits, as directed by my provider. I will keep my appointment or reschedule if I have to cancel. I will notify my provider of any barriers to my plan of care. I will notify my provider of any symptoms that indicate a worsening of my condition. Barriers: none  Plan for overcoming my barriers: work with [facility]  Confidence: 10/10  Anticipated Goal Completion Date: 7/27/2022                Prior to Admission medications    Medication Sig Start Date End Date Taking?  Authorizing Provider   albuterol sulfate HFA (PROVENTIL HFA) 108 (90 Base) MCG/ACT inhaler Inhale 2 puffs into the lungs every 4 hours as needed for Wheezing 12/12/22 12/12/23  Rodriguez Gonzales MD   ondansetron (ZOFRAN) 4 MG tablet Take 1 tablet by mouth daily as needed for Nausea or Vomiting 12/12/22   Rodriguez Gonzales MD   busPIRone (BUSPAR) 10 MG tablet TAKE 1 TABLET BY MOUTH 3 TIMES A DAY 12/8/22   Rodriguez Gonzales MD   benzonatate (TESSALON PERLES) 100 MG capsule Take 1 capsule by mouth every 6 hours as needed for Cough 12/1/22 12/1/23  Rodriguez Gonzales MD   glipiZIDE (GLUCOTROL) 10 MG tablet TAKE 1 TABLET BY MOUTH TWO TIMES A DAY 11/30/22   Rodriguez Gonzales MD   sertraline (ZOLOFT) 50 MG tablet TAKE ONE TABLET BY MOUTH EVERY MORNING 11/30/22   Rodriguez Gonzales MD   omeprazole (PRILOSEC) 20 MG delayed release capsule TAKE 1 CAPSULE BY MOUTH 2 TIMES A DAY 11/30/22   Rodriguez Gonzales MD   lisinopril (PRINIVIL;ZESTRIL) 40 MG tablet TAKE 1 TABLET BY MOUTH ONE TIME A DAY 11/30/22   MD Haroldo Castañeda 28G MISC 100 Lancet by Does not apply route three times daily 11/30/22   Jennifer Couch MD   methocarbamol (ROBAXIN) 750 MG tablet  11/8/22   Historical Provider, MD   Prodigy Lancets 28G MISC 1 Bottle by Does not apply route three times daily 11/20/22   Blessing President, DO   magnesium oxide (MAG-OX) 400 MG tablet Take 1 tablet by mouth daily 11/10/22   Mo'Rosanne Cast MD   gabapentin (NEURONTIN) 400 MG capsule Take 1 capsule by mouth 3 times daily for 30 days.  11/9/22 12/9/22  Mo'Rosanne Cast MD   ASPIRIN LOW DOSE 81 MG EC tablet TAKE 1 TABLET BY MOUTH ONE TIME A DAY 11/8/22   Kamlesh Wilkins MD   celecoxib (CELEBREX) 100 MG capsule Take 100 mg by mouth 2 times daily    Historical Provider, MD   glipiZIDE (GLUCOTROL) 10 MG tablet Take 10 mg by mouth 2 times daily (before meals)    Historical Provider, MD   furosemide (LASIX) 20 MG tablet Take 20 mg by mouth daily    Historical Provider, MD   bimatoprost (LUMIGAN) 0.01 % SOLN ophthalmic drops Place 1 drop into both eyes nightly    Historical Provider, MD   omeprazole (PRILOSEC) 20 MG delayed release capsule Take 20 mg by mouth daily    Historical Provider, MD   rOPINIRole (REQUIP) 2 MG tablet Take 2 mg by mouth nightly    Historical Provider, MD   atorvastatin (LIPITOR) 40 MG tablet Take 40 mg by mouth nightly    Historical Provider, MD   sertraline (ZOLOFT) 50 MG tablet Take 50 mg by mouth daily    Historical Provider, MD   Empagliflozin-metFORMIN HCl (SYNJARDY) 5-1000 MG TABS Take 1,000 mg by mouth 2 times daily (before meals)    Historical Provider, MD   Dulaglutide (TRULICITY) 1.5 EG/1.0NZ SOPN Inject 1.5 mg into the skin once a week    Historical Provider, MD   albuterol sulfate HFA (PROVENTIL;VENTOLIN;PROAIR) 108 (90 Base) MCG/ACT inhaler Inhale 2 puffs into the lungs every 6 hours as needed for Wheezing    Historical Provider, MD   diclofenac sodium (VOLTAREN) 1 % GEL Apply 2 g topically as needed for Pain    Historical Provider, MD   docusate sodium (COLACE) 100 MG capsule Take 100 mg by mouth 2 times daily as needed for Constipation    Historical Provider, MD   cyclobenzaprine (FLEXERIL) 10 MG tablet Take 10 mg by mouth 3 times daily as needed for Muscle spasms    Historical Provider, MD   rOPINIRole (REQUIP) 2 MG tablet  8/29/22   Historical Provider, MD   TRULICITY 1.5 IP/2.8DX [address]  Patient taking differently: Inject 1.5 mg into the skin once a week Saturdays 9/21/22   Blayne Elizalde MD   celecoxib (CELEBREX) 100 MG capsule TAKE 1 CAPSULE BY MOUTH 2 TIMES A DAY 9/8/22   Denise Sheldon MD   SYNJARDY 5-1000 MG TABS TAKE 1 TABLET BY MOUTH 2 TIMES A DAY 8/30/22   Blayne Elizalde MD   Elastic Bandages & Supports (ELBOW BRACE) MISC 1 each by Does not apply route at bedtime Please dispense left cubital tunnel brace 8/18/22 11/16/22  ORLY Kidd - CNP   alendronate (FOSAMAX) 70 MG tablet Take 1 tablet by mouth every 7 days  Patient taking differently: Take 70 mg by mouth every 7 days Wednesdays 7/29/22   Uli Guan MD   oxyCODONE-acetaminophen (PERCOCET) 5-325 MG per tablet Take by mouth every 4 hours as needed for Pain. Historical Provider, MD   atorvastatin (LIPITOR) 40 MG tablet Take 1 tablet by mouth at bedtime 7/15/22   Kiana Aparicio MD   furosemide (LASIX) 20 MG tablet Take 0.5 tablets by mouth every other day 7/15/22   Kiana Aparicio MD   blood glucose test strips (PRODIGY NO CODING BLOOD GLUC) strip Use to test once daily and as needed as directed by provider. Please dispense Prodigy brand. 7/12/22   Blayne Elizalde MD   blood glucose monitor kit and supplies Dispense sufficient amount for indicated testing frequency plus additional to accommodate PRN testing needs. Dispense all needed supplies to include: monitor, strips, lancing device, lancets, control solutions, alcohol swabs.    Prodigy Brand 7/6/22   Blayne Elizalde MD   repaglinide (PRANDIN) 1 MG tablet TAKE 1 TABLET BY MOUTH 3 TIMES A DAY BEFORE MEALS 12/8/20   Martina Alberts MD   bimatoprost (LUMIGAN) 0.01 % SOLN ophthalmic drops Place 1 drop into both eyes nightly 4/26/18   Historical Provider, MD   Blood Glucose Monitoring Suppl (PRODIGY AUTOCODE BLOOD GLUCOSE) w/Device KIT 1 Device by Does not apply route 3 times daily 4/26/19   Martina Alberts MD       Future Appointments   Date Time Provider Nicola Zaidi   12/16/2022  1:00 PM Lugene Butter, OT [facility] [place]   12/20/2022 11:00 AM Jaycee Cargo, OT [facility] [place]   12/21/2022 11:00 AM Jaycee Cargo, OT [facility] [place]   12/23/2022  1:15 PM Lor Esparza MD [facility]   12/23/2022  1:45 PM Lugene Butter, OT [facility] [place]   12/28/2022  1:45 PM Jaycee Cargo, OT [address]   12/30/2022  2:00 PM Lugene Butter, OT [facility] [place]   1/20/2023 10:30 AM DO Artemio Alvarenga Neuro [address]   2/15/2023  3:30 PM Omkar Pleitez MD Neuro [facility] Neurology -

## 2022-12-16 ENCOUNTER — HOSPITAL ENCOUNTER (OUTPATIENT)
Dept: OCCUPATIONAL THERAPY | Age: 62
Setting detail: THERAPIES SERIES
Discharge: HOME | End: 2022-12-16
Payer: COMMERCIAL

## 2022-12-16 LAB
BILIRUBIN URINE: NEGATIVE
CASTS UA: ABNORMAL /LPF (ref 0–8)
COLOR: YELLOW
EPITHELIAL CELLS UA: ABNORMAL /HPF (ref 0–5)
GLUCOSE URINE: ABNORMAL
KETONES, URINE: NEGATIVE
LEUKOCYTE ESTERASE, URINE: NEGATIVE
NITRITE, URINE: NEGATIVE
PH UA: 5 (ref 5–8)
PROTEIN UA: NEGATIVE
RBC UA: ABNORMAL /HPF (ref 0–4)
SPECIFIC GRAVITY UA: 1.03 (ref 1–1.03)
TURBIDITY: CLEAR
URINE HGB: NEGATIVE
UROBILINOGEN, URINE: NORMAL
VITAMIN D 25-HYDROXY: 28.4 NG/ML
WBC UA: ABNORMAL /HPF (ref 0–5)
YEAST: ABNORMAL

## 2022-12-16 PROCEDURE — 97110 THERAPEUTIC EXERCISES: CPT

## 2022-12-16 NOTE — FLOWSHEET NOTE
bicep and medial elbow. Pt completed AROM, [address] and light strengthening of L UE. Introduced ulnar nerve glides, and resistive weight bearing. Pt tolerated new exercises well w/out complaint. Pt left OT reporting no increase in symptoms. Pt tolerated tx well on this date. [] No change. [] Other     [x] Patient would continue to benefit from skilled occupational therapy services in order to: improve complaint of pain, numbness and tingling, improve strength for the completion of ADLs/IADLs    STG/LTG  Short Term Goals Completed by 5 visits  Pt will demo & report modified independence with lt UE HEP   Pt will demo improved lt hand (index,middle,ring, little)  AROM (flexion to [facility] ) by 1cm to improve grasp objects 1/2\" to 1\" diameter. Increase AROM lt UE: shoulder (flexion, abduction, ER) by 5, wrist extension by 5-10 to improve reach into [facility]   Pt will demo improved lt hand dexterity by completing 9 hole peg test 5 seconds quicker than eval.      Long Term Goals Completed by 10 visits   Using the UEFI  pt will report less difficulty using lt UE for ADL/light IADLs ( getting dressed, hair grooming, button manipulation, laundry, opening jars) & increase in her total  UEFI score by 10 points   Compared to eval pt will complete 9 hole peg test 10 seconds quicker using lt hand. Pt will reports less difficulty with fine motor ADLs (hooking her bra)   Increase lt hand AROM & grasp -compared to eval  pt will flex fingers 2cm better. Assess lt hand strength & upgrade HEP . Increase AROM lt shoulder by 10 (flexion, abduction ) for reaching during daily household activities. Pt. Education:  [] Yes  [] No  [x] Reviewed Prior HEP/Ed  Method of Education: [] Verbal  [] Demo  [] Written  Re:  Comprehension of Education:  [] Verbalizes understanding. [] Demonstrates understanding. [] Needs review. [] Demonstrates/verbalizes HEP/Ed previously given.       Plan: [x] Continue current frequency toward short and long term goals.   [x] Specific Instructions for subsequent treatments: Cont AROM exercises    [] Other:       Time In: 1300  Time Out: 6758        Electronically signed by:  YAAKOV Wiley/CARMEN

## 2022-12-19 RX ORDER — LANOLIN ALCOHOL/MO/W.PET/CERES
CREAM (GRAM) TOPICAL
Qty: 30 TABLET | Refills: 3 | OUTPATIENT
Start: 2022-12-19

## 2022-12-20 ENCOUNTER — HOSPITAL ENCOUNTER (OUTPATIENT)
Dept: OCCUPATIONAL THERAPY | Age: 62
Setting detail: THERAPIES SERIES
Discharge: HOME | End: 2022-12-20
Payer: COMMERCIAL

## 2022-12-20 PROCEDURE — 97110 THERAPEUTIC EXERCISES: CPT

## 2022-12-20 RX ORDER — MAGNESIUM OXIDE 400 MG/1
400 TABLET ORAL DAILY
Qty: 30 TABLET | Refills: 3 | Status: SHIPPED | OUTPATIENT
Start: 2022-12-20

## 2022-12-20 RX ORDER — GABAPENTIN 400 MG/1
400 CAPSULE ORAL 3 TIMES DAILY
Qty: 90 CAPSULE | Refills: 0 | Status: SHIPPED | OUTPATIENT
Start: 2022-12-20 | End: 2023-01-19

## 2022-12-20 ASSESSMENT — PAIN DESCRIPTION - FREQUENCY: FREQUENCY: CONTINUOUS

## 2022-12-20 ASSESSMENT — PAIN DESCRIPTION - DESCRIPTORS: DESCRIPTORS: SORE

## 2022-12-20 ASSESSMENT — PAIN DESCRIPTION - ORIENTATION: ORIENTATION: LEFT

## 2022-12-20 ASSESSMENT — PAIN SCALES - GENERAL: PAINLEVEL_OUTOF10: 1

## 2022-12-20 ASSESSMENT — PAIN DESCRIPTION - PAIN TYPE: TYPE: ACUTE PAIN

## 2022-12-20 ASSESSMENT — PAIN DESCRIPTION - LOCATION: LOCATION: ARM

## 2022-12-20 NOTE — PROGRESS NOTES
[facility] [address] [place] [address]. [address]         Phone: [phone]       Fax: [phone]     Occupational Therapy Daily Treatment note     Occupational Therapy: Daily Note   Patient: Kailash Gambino (45 y.o. female)   Examination Date: 2022  No data recorded  Progress Note Counter: MD appt in 2023     :  1960 # of Visits since [facility]:   Visit count could not be calculated. Make sure you are using a visit which is associated with an episode. MRN: [id number]  CSN: [id number] Start of Care Date: 2022   Insurance: Payor: Reba Urbina 150 / Plan: [employer] [address] / Product Type: *No Product type* /   Insurance ID: [id number] - ([place] [employer]) Secondary Insurance (if applicable): Insurance Information: [employer] (pt has 10 visits left on her insurance till the end of the year). 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] Ulnar neuropathy at elbow lt (G56.22 -ICD-10 code)  Treatment Diagnosis: lt UE/hand: weakness , stiffness (shoulder, wrist,hand), impaired coordinaiton, pain, impaired sensation  (ICD-10 code: W54.94,C44.177, M25.632,M25.642, R27.9, M79.602, R20.2)        SUBJECTIVE EXAMINATION   Pain Level: Pain Screening  Patient Currently in Pain: Yes  Pain Assessment: 0-10  Pain Level: 1 (lt arm)  Post Treatment Pain Level: 1  Patient's Stated Pain Goal: 0 - No pain  Pain Type: Acute pain  Pain Location: Arm  Pain Orientation: Left  Pain Descriptors: Sore  Pain Frequency: Continuous    Patient Comments: Subjective: Pt states that she was running late d/t traffic. Pt reports decrease lt arm pain but has lt leg pain is a 7. Pt reports 4 steri strips on lt arm.     HEP Compliance: Good        OBJECTIVE EXAMINATION Restrictions: Restrictions/Precautions: Up as Tolerated; General Precautions     Position Activity Restriction  Other position/activity restrictions: Per pt lifting restriction 5# or  less. TREATMENT     Exercises:     Treatment Reasoning    OT Exercise 1: PROM lt shoulder elbow ,wrist, hand. ,Lt hand finger blocking exercises 2 sets 20x each (PIP flex/extension, DiP flex/extension )  OT Exercise 3: lt hand based skateboard on table :15x each way (reaching forward/back, side to side, circles cw & ccw, figure 8)  OT Exercise 4: UE 1# cane exercises  2 sets 10x each way ( shoulder circles cw & ccw, shoulder horizontal abduction/adduction to rt/lt sides, elbow fle/extension )  OT Exercise 6: Ulnar nerve glide 2x10   Shoulder 90 w/ elbow flex/ext ,AROM 3 sets of 10x ( lt elbow flex/extension, lt forearm supination/pronation)  OT Exercise 7: yellow 1.5# digiflex: lt hand gripping 3 sets 10x  OT Exercise 9: key pegs :using lt hand place all 25 in board, then remove pegs & hold in hand. OT Exercise 11: yellow therapy ball on table 15x each way:forward/back, circles cw & ccw, side to side, press into top of ball , press into side of ball  (pt standing)  OT Exercise 12: Graded clothespins /Resistive clips  2x6   Yellow 1# (6), Red 2#  (6) 2 times  OT Exercise 13: Yellow theraband Flexbar:  Wrist Flexion/Ext,  Wrist pronation , Wrist supination,  Wrist ulnar deviation,  Wrist radial dev,  make C,  make backward C  2x10  Complete    Limitations addressed: Coordination, Flexibility, Strength  Limitations addressed: lt UE rom, coordination  Therapist provided: Verbal cuing  Progressed: Resistance, Repetitions  Progressed: Progressed to 1# theracane for UE strengthening with increase reps. .                     ASSESSMENT     Assessment: Assessment: OT tx included lt UE PROM, & pt participating in therapeutic exercicses to improve lt UE coordination, fine motor skills, rom, & strength. Pt reports lt arm soreness & fatigues when completing 2nd set of theracane exercises. .Pt reports tingling  lt ring/little fingers after PROM exercises. Pt reports a little lt shoulder pain after ulnar n. glides for lt elbow with shoulder at 90 flexion. Pt reports more difficulty with key peg manipulation today, but she was able to hold all the pegs in lt hand. OT provided pt positive feedback encouraging her with exercises & talking about her progress with exercises & HEP. Pt states she just does the pink foam block for lt hand gripping & pinching & not the easier yellow foam block now.   Performance deficits / Impairments: Decreased ROM, Decreased strength, Decreased fine motor control, Decreased high-level IADLs, Decreased sensation    Post-Treatment Pain Level: 1    Prognosis: Good    Activity Tolerance: Patient tolerated treatment well             GOALS        TREATMENT PLAN   REQUIRES OT FOLLOW-UP: Yes  Type: Outpatient  Plan  Plan Frequency: 2-3x/week  Plan Weeks: 10 visits (pt has 10 visits left on insurance for this year)  Current Treatment Recommendations: ROM, Patient/Caregiver education & training, Coordination training, Strengthening  Additional Comments: continue OT       Therapy Time  Individual Time In: 1110  Individual Time Out: 1200  Minutes: 50  Timed Code Treatment Minutes: 50 Minutes       Electronically signed by Dora Gant OT  on 12/20/2022 at 12:19 PM       Treatment Charges: Mins Units Time In/Out   [] Evaluation       []  Low       []  Moderate       []  High      []  Electrical Stim      []  Iontophoresis      []  Paraffin      []  Ultrasound        []  Masage      []  Neuromuscular Re-ed      []  ADL      [x]  Ther Exercise 50 3    []  Ther Activities      []  Neuro Re-Ed      []  Splinting      []  Other      Total Treatment time 50 min 3          POC NOTE

## 2022-12-20 NOTE — TELEPHONE ENCOUNTER
E-scribe requesting refill for Magnesium Oxide. Please review and e-scribe if applicable.  **[address] called to verify they are NOT going to refill this until it is due, they just need the approval so when the times comes for her to be due they will just be able to fill it**    Last Visit Date: 12/8/2022    Next Visit Date:  Future Appointments   Date Time Provider Nicola Dyan   12/20/2022 11:00 AM Nelson Mancuso, OT [facility] [address]   12/21/2022 11:00 AM Nelson Mancuso, OT [facility]   12/22/2022  8:30 AM Nelson Mancuso, OT [facility] OT [address] [place] [address]   12/23/2022  1:15 PM Tasneem Lema MD [facility]   12/28/2022  1:45 PM Nelson Mancuso, OT [facility] [address]   12/29/2022  9:30 AM Vanda Berger MD [facility]   12/29/2022  2:30 PM Oscar Be, OT [facility] [address]   12/30/2022  2:00 PM Oscar Be, OT [facility] 1 [facility]   1/20/2023 10:30 AM DO Artemio Kincaid [facility]   2/15/2023  3:30 PM Omkar Monson MD Neuro [facility] Neurology -

## 2022-12-21 ENCOUNTER — HOSPITAL ENCOUNTER (OUTPATIENT)
Dept: OCCUPATIONAL THERAPY | Age: 62
Setting detail: THERAPIES SERIES
Discharge: HOME | End: 2022-12-21
Payer: COMMERCIAL

## 2022-12-21 PROCEDURE — 97110 THERAPEUTIC EXERCISES: CPT

## 2022-12-21 ASSESSMENT — PAIN DESCRIPTION - DESCRIPTORS: DESCRIPTORS: SORE

## 2022-12-21 ASSESSMENT — PAIN DESCRIPTION - LOCATION: LOCATION: ARM

## 2022-12-21 ASSESSMENT — PAIN DESCRIPTION - PAIN TYPE: TYPE: ACUTE PAIN

## 2022-12-21 ASSESSMENT — PAIN DESCRIPTION - ORIENTATION: ORIENTATION: LEFT

## 2022-12-21 ASSESSMENT — PAIN DESCRIPTION - FREQUENCY: FREQUENCY: CONTINUOUS

## 2022-12-21 ASSESSMENT — PAIN SCALES - GENERAL: PAINLEVEL_OUTOF10: 2

## 2022-12-21 NOTE — PROGRESS NOTES
[address]         Phone: [phone]       Fax: [phone]     Occupational Therapy Daily Treatment note     Occupational Therapy: Daily Note   Patient: Dmitry Smith (58 y.o. female)   Examination Date: 2022  No data recorded  Progress Note Counter: MD appt in 2023     :  1960 # of Visits since [facility]:   6   MRN: [id number]  CSN: [id number] Start of Care Date: 2022   Insurance: Payor: Rodney Rubio / Plan: [employer] [address] / Product Type: *No Product type* /   Insurance ID: [id number] - ([place] [employer]) Secondary Insurance (if applicable): Insurance Information: [place] [employer] (pt has 10 visits left on her insurance till the end of the year). 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] Ulnar neuropathy at elbow lt (G56.22 -ICD-10 code)  Treatment Diagnosis: lt UE/hand: weakness , stiffness (shoulder, wrist,hand), impaired coordinaiton, pain, impaired sensation  (ICD-10 code: C81.99,S24.012, M25.632,M25.642, R27.9, M79.602, R20.2)        SUBJECTIVE EXAMINATION   Pain Level: Pain Screening  Patient Currently in Pain: Yes  Pain Assessment: 0-10  Pain Level: 2  Post Treatment Pain Level: 2  Patient's Stated Pain Goal: 0 - No pain  Pain Type: Acute pain  Pain Location: Arm  Pain Orientation: Left  Pain Descriptors: Sore  Pain Frequency: Continuous    Patient Comments: Subjective: Pt reports lt arm a little painful. HEP Compliance: Good        OBJECTIVE EXAMINATION   Restrictions: Restrictions/Precautions: Up as Tolerated; General Precautions     Position Activity Restriction  Other position/activity restrictions: Per pt lifting restriction 5# or  less.   TREATMENT     Exercises: Treatment Reasoning    OT Exercise 1: PROM lt shoulder elbow ,wrist, hand. ,Lt hand finger blocking exercises 2 sets 20x each (PIP flex/extension, DiP flex/extension )  OT Exercise 2: HEP : OT issued & reviewed Hand Coordination & Dexterity OT [facility] Handout with pt for using lt UE. Pt instructed to use small items around the house for the exercises & object manipulation. Pt practiced today linking paper clips, finger walking on table, turning pages, palm up/down using lt hand but more items on handout. OT Exercise 3: lt hand based skateboard on table :20x each way (reaching forward/back, side to side, circles cw & ccw, figure 8)  OT Exercise 5: cone stacking (10 cones) using lt UE: stack & unstack (move cones from rt & lt side), pass cone over lt shoulder  OT Exercise 6: Ulnar nerve glide 2x10   Shoulder 90 w/ elbow flex/ext ,AROM 3 sets of 10x ( lt elbow flex/extension, lt forearm supination/pronation)  OT Exercise 13: Yellow theraband Flexbar:  Wrist Flexion/Ext,  Wrist pronation , Wrist supination,  Wrist ulnar deviation,  Wrist radial dev,  make C,  make backward C  2x10  Complete  OT Exercise 14: juxaciser - using lt UE moving disc over & back  2 sets  OT Exercise 15: mirela tweeser dexterity exercise: using lt hand  15  metal pins with tweezers placing in every other hole(in board), then remove pins using twezer. Limitations addressed: Coordination, Flexibility, Strength  Limitations addressed: lt UE rom, coordination  Therapist provided: Verbal cuing  Progressed: Complexity of movement, Repetitions  Progressed: Begun juxaciser for lt UE rom/coordination & mirela tweezer exercise for lt hand fine motor skills. .Increase reps hand based skateboard. Upgraded pt's lt UE HEP. Patient Education: OT Education: Home Exercise Program  Patient Education: HEP instruction: OT reviewed all items on \"Hand coordination & [address] [facility] handout\". See OT exercises for detail.  OT issued pt handout & ptu copy in chart. Pt reports that handout will help her. OT told pt to pick 2-3 items on handoutto do each day. ASSESSMENT     Assessment: Assessment: OT tx focus on lt UE rom, coordination, & strengthening exercises. OT upgraded pt's lt UE HEP for coordination/rom. Progressed pt's exercises to include juxaciser with lt arm extended and tweezer dexterity activity for improving lt hand fine motor skills. Pt reports difficulty & a little tingling lt arm during juxaciser exercise. Pt has difficulty & takes increase time completing tweezer dexterity exercise using lt hand. See OT exericses for detail.   Performance deficits / Impairments: Decreased ROM, Decreased strength, Decreased fine motor control, Decreased high-level IADLs, Decreased sensation    Post-Treatment Pain Level: 2    Prognosis: Good    Activity Tolerance: Patient tolerated treatment well       Barriers impacting rehab : None         TREATMENT PLAN   REQUIRES OT FOLLOW-UP: Yes  Type: Outpatient  Plan  Plan Frequency: 2-3x/week  Plan Weeks: 10 visits (pt has 10 visits left on insurance for this year)  Current Treatment Recommendations: ROM, Patient/Caregiver education & training, Coordination training, Strengthening  Additional Comments: continue OT       Therapy Time  Individual Time In: 1105  Individual Time Out: 2003  Minutes: 52          Electronically signed by Fidel Perez OT  on 12/21/2022 at 12:22 PM       Treatment Charges: Mins Units Time In/Out   [] Evaluation       []  Low       []  Moderate       []  High      []  Electrical Stim      []  Iontophoresis      []  Paraffin      []  Ultrasound        []  Masage      []  Neuromuscular Re-ed      []  ADL      [x]  Ther Exercise 52 3    []  Ther Activities      []  Neuro Re-Ed      []  Splinting      []  Other      Total Treatment time 52 min 3          POC NOTE

## 2022-12-22 ENCOUNTER — HOSPITAL ENCOUNTER (OUTPATIENT)
Dept: OCCUPATIONAL THERAPY | Age: 62
Setting detail: THERAPIES SERIES
Discharge: HOME | End: 2022-12-22
Payer: COMMERCIAL

## 2022-12-22 PROCEDURE — 97110 THERAPEUTIC EXERCISES: CPT

## 2022-12-22 ASSESSMENT — PAIN DESCRIPTION - FREQUENCY: FREQUENCY: CONTINUOUS

## 2022-12-22 ASSESSMENT — PAIN DESCRIPTION - LOCATION: LOCATION: ELBOW

## 2022-12-22 ASSESSMENT — PAIN DESCRIPTION - PAIN TYPE: TYPE: ACUTE PAIN

## 2022-12-22 ASSESSMENT — PAIN SCALES - GENERAL: PAINLEVEL_OUTOF10: 1

## 2022-12-22 ASSESSMENT — 9 HOLE PEG TEST
TESTTIME_SECONDS: 52.91
TEST_RESULT: IMPAIRED

## 2022-12-22 ASSESSMENT — PAIN DESCRIPTION - DESCRIPTORS: DESCRIPTORS: SORE

## 2022-12-22 ASSESSMENT — PAIN DESCRIPTION - ORIENTATION: ORIENTATION: LEFT

## 2022-12-22 NOTE — PROGRESS NOTES
[address]         Phone: [phone]       Fax: [phone]     Occupational Therapy Daily Treatment note     Occupational Therapy: Daily Note   Patient: Gil Jackson (19 y.o. female)   Examination Date: 2022  No data recorded  Progress Note Counter: MD appt in 2023     :  1960 # of Visits since [facility]:   Visit count could not be calculated. Make sure you are using a visit which is associated with an episode. MRN: [id number]  CSN: [id number] Start of Care Date: 2022   Insurance: Payor: John King / Plan: [employer] [address] / Product Type: *No Product type* /   Insurance ID: [id number] - ([employer]) Secondary Insurance (if applicable): Insurance Information: [employer] (pt has 10 visits left on her insurance till the end of the year). Referring Physician: MD Eyad Melissa APRN-CNP   PCP: Alex Hallman MD Visits to Date/Visits Approved: 7 / 10    No Show/Cancelled Appts:   /       Medical Diagnosis: Radiculopathy, lumbar region [M54.16] Ulnar neuropathy at elbow lt (G56.22 -ICD-10 code)  Treatment Diagnosis: lt UE/hand: weakness , stiffness (shoulder, wrist,hand), impaired coordinaiton, pain, impaired sensation  (ICD-10 code: N50.56,O43.552, M25.632,M25.642, R27.9, M79.602, R20.2)        SUBJECTIVE EXAMINATION   Pain Level: Pain Screening  Patient Currently in Pain: Yes  Pain Assessment: 0-10  Pain Level: 1  Post Treatment Pain Level: 1  Patient's Stated Pain Goal: 0 - No pain  Pain Type: Acute pain  Pain Location: Elbow  Pain Orientation: Left  Pain Descriptors: Sore  Pain Frequency: Continuous    Patient Comments: Subjective: Pt reports a little lt arm soreness after yesterday tx. Pt states yesterday she went to store and bought items & box for doing her lt UE/hand coordination HEP. Pt states her lt leg pain is 8.     HEP Compliance: Good        OBJECTIVE EXAMINATION   Restrictions: Restrictions/Precautions: Up as Tolerated; General Precautions     Position Activity Restriction  Other position/activity restrictions: Per pt lifting restriction 5# or  less. Hand Dominance:     Left  Right     Strength  Handle Setting 2: average  15#  Trial 1: 20  Trial 2: 10  Trial 3: 15  Average: 15     Pinch Strength (if applicable) Left Hand Strength - Lateral Pinch (lbs)  Trial 1: 4  Trial 2: 3  Trial 3: 3  Average: 3.33  Left Hand Strength - John (lbs)  Trial 1: 2  Trial 2: 2  Trial 3: 2  Average: 2  Left Hand Strength - Tip (lbs)  Trial 1: 1  Trial 2: 2 (pain lt thumb)  Trial 3: 1  Average: 1.33         Left Hand Edema  Right Hand Edema             Fine Motor Skills:   Fine Motor Skills  Left 9-Hole Peg Test: Impaired  Left 9 Hole Peg Test Time (secs): 52.91        TREATMENT     Exercises:     Treatment Reasoning    OT Exercise 1: PROM lt shoulder elbow ,wrist, hand.  ,Lt hand finger blocking exercises 1 sets 25x each (PIP flex/extension, DiP flex/extension )  OT Exercise 3: lt hand based skateboard on table :20x each way (reaching forward/back, side to side, circles cw & ccw, figure 8)  OT Exercise 6: Ulnar nerve glide 2x10   Shoulder 90 w/ elbow flex/ext ,AROM 3 sets of 10x ( lt elbow flex/extension, lt forearm supination/pronation)  OT Exercise 8: easy  pegs with foam board: using lt hand place all 25 pegs in board, then remove pegs & try to hold them  OT Exercise 12: Graded clothespins /Resistive clips  2x6   Yellow 1# (6), Red 2#  (6), Green 4# (4) 1 times  OT Exercise 13: Yellow theraband Flexbar:  Wrist Flexion/Ext,  Wrist pronation , Wrist supination,  Wrist ulnar deviation,  Wrist radial dev,  make C,  make backward C  2x10  Complete  OT Exercise 14: juxaciser - using lt UE moving disc over & back  4 sets    Limitations addressed: Coordination, Flexibility, Strength  Limitations addressed: lt UE rom, coordination  Therapist provided: Verbal cuing  Progressed: Complexity of movement  Progressed: OT added marble manipulation to peg board exercise. ASSESSMENT     Assessment: Assessment: PROM lt UE. Pt participates in lt UE therapeutic exercises for improving rom, fine motor skills, & strength for her daily home activities. Pt demo weakness lt hand & . Pt's fine motor testing slower today. Pt has difficulty with marble manipultion on top of easy  pegs. See OT exercises fpr details. Performance deficits / Impairments: Decreased ROM, Decreased strength, Decreased fine motor control, Decreased high-level IADLs, Decreased sensation    Post-Treatment Pain Level: 1    Prognosis: Good    Activity Tolerance: Patient tolerated treatment well             GOALS   Patient Goals   Patient goals : To be able to move lt little finger normally, make fist, type, go back to work  Short Term Goals Completed by 5 visits Current Status Goal Status   Pt will demo & report modified independence with lt UE HEP Pt doing lt UE rom, coordination exercises. foam block for /pinch. Met   Pt will demo improved lt hand (index,middle,ring, little)  AROM (flexion to [facility] ) by 1cm to improve grasp objects 1/2\" to 1\" diameter. Increase AROM lt UE: shoulder (flexion, abduction, ER) by 5, wrist extension by 5-10 to improve reach into [facility]       Pt will demo improved lt hand dexterity by completing 9 hole peg test 5 seconds quicker than eval. Pt's time slower with testing today. In progress, Not Met             Long Term Goals Completed by 10 visits Current Status Goal Status    Using the UEFI pt will report less difficulty using lt UE for ADL/light IADLs ( getting dressed, hair grooming, button manipulation, laundry, opening jars) & increase in her total UEFI score by 10 points       Compared to eval pt will complete 9 hole peg test 10 seconds quicker using lt hand.  Pt will reports less difficulty with fine motor ADLs (hooking her bra)   In progress   Increase lt hand AROM & grasp -compared to eval  pt will flex fingers 2cm better. Assess lt hand strength & upgrade HEP .   pinch assessed 12-. Pt gripping foam block for HEP  lt hand. Met   Increase AROM lt shoulder by 10 (flexion, abduction ) for reaching during daily household activities.             TREATMENT PLAN   REQUIRES OT FOLLOW-UP: Yes  Type: Outpatient  Plan  Plan Frequency: 2-3x/week  Plan Weeks: 10 visits (pt has 10 visits left on insurance for this year)  Current Treatment Recommendations: ROM, Patient/Caregiver education & training, Coordination training, Strengthening  Additional Comments: continue OT       Therapy Time  Individual Time In: 5184  Individual Time Out: 8113  Minutes: 52  Timed Code Treatment Minutes: 47 Minutes       Electronically signed by Deepika Steel OT  on 12/22/2022 at 5:43 PM       Treatment Charges: Mins Units Time In/Out   [] Evaluation       []  Low       []  Moderate       []  High      []  Electrical Stim      []  Iontophoresis      []  Paraffin      []  Ultrasound        []  Masage      []  Neuromuscular Re-ed      []  ADL      [x]  Ther Exercise 47 3 08:38-09:25   []  Ther Activities      []  Neuro Re-Ed      []  Splinting      [x]  Other 5 0 08:33-08:38   Total Ttime 52 min           POC NOTE

## 2022-12-23 ENCOUNTER — APPOINTMENT (OUTPATIENT)
Dept: OCCUPATIONAL THERAPY | Age: 62
End: 2022-12-23
Payer: COMMERCIAL

## 2022-12-28 ENCOUNTER — CARE COORDINATION (OUTPATIENT)
Dept: OTHER | Facility: CLINIC | Age: 62
End: 2022-12-28

## 2022-12-28 ENCOUNTER — HOSPITAL ENCOUNTER (OUTPATIENT)
Dept: OCCUPATIONAL THERAPY | Age: 62
Setting detail: THERAPIES SERIES
Discharge: HOME | End: 2022-12-28
Payer: COMMERCIAL

## 2022-12-28 ENCOUNTER — APPOINTMENT (OUTPATIENT)
Dept: OCCUPATIONAL THERAPY | Age: 62
End: 2022-12-28
Payer: COMMERCIAL

## 2022-12-28 PROCEDURE — 97110 THERAPEUTIC EXERCISES: CPT

## 2022-12-28 ASSESSMENT — PAIN DESCRIPTION - ORIENTATION: ORIENTATION: LEFT

## 2022-12-28 ASSESSMENT — PAIN DESCRIPTION - LOCATION: LOCATION: ELBOW

## 2022-12-28 ASSESSMENT — PAIN SCALES - GENERAL: PAINLEVEL_OUTOF10: 1

## 2022-12-28 ASSESSMENT — PAIN DESCRIPTION - DESCRIPTORS: DESCRIPTORS: SORE

## 2022-12-28 ASSESSMENT — PAIN DESCRIPTION - FREQUENCY: FREQUENCY: CONTINUOUS

## 2022-12-28 NOTE — CARE COORDINATION
Ambulatory Care Coordination Note  2022    ACC: Navjot Andrea RN    Ambulatory Care Manager [facility]) contacted the patient by telephone to follow up on progress, discuss new issues or concerns, and reinforce/ provide patient education. Verified name and  with patient as identifiers. Patient states she has a f/u appt with PCP for a physical and follow up on her cough. She followed up with nephrology today. States kidney labs are good and will f/u in 1 year. Patient called Dr. Jas Jackson office last week and they still did not have EMG records. Dr. Yunior Love was out of the office last week and this week so she wouldn't have been able to get in for an injection even if they did have records. Patient states she will call this week to follow up on this. States no change with pyriformis muscle- not any worse and patient is happy about this. Patient states she is doing well with OT for her arm/hand. She has 2 more sessions before insurance terms 2022. OT gave patient a list of things she can do at home to improve motor skills. Patient states her insurance terms on 2022. She will be applying for medicade sometime this week. Reinforced/ Provided Education:  Discussed red flags and appropriate site of care based on symptoms and resources available to patient including: PCP  Specialist  Benefits related nurse triage line  Urgent care clinics  When to call [address] Importance and benefits of: Follow up with PCP and specialist, medication adherence, self monitoring and reporting of symptoms. Plan:  Patient is doing well. Patient's insurance terms on 2022. Patient is no longer eligible for ACM program.   No further outreach scheduled with this ACM, ACM will sign off care team at this time. Episode of Care resolved. Patient has this ACM's contact information if future needs arise.          Offered patient enrollment in the Remote Patient Monitoring (RPM) program for in-home monitoring: NA. Lab Results       None                 Goals Addressed                   This Visit's Progress     COMPLETED: Conditions and Symptoms   On track     I will schedule office visits, as directed by my provider. I will keep my appointment or reschedule if I have to cancel. I will notify my provider of any barriers to my plan of care. I will notify my provider of any symptoms that indicate a worsening of my condition. Barriers: none  Plan for overcoming my barriers: work with [facility]  Confidence: 10/10  Anticipated Goal Completion Date: 7/27/2022                Prior to Admission medications    Medication Sig Start Date End Date Taking? Authorizing Provider   gabapentin (NEURONTIN) 400 MG capsule Take 1 capsule by mouth 3 times daily for 30 days.  12/20/22 1/19/23  Mo'Rosanne Jones MD   magnesium oxide (MAG-OX) 400 MG tablet Take 1 tablet by mouth daily 12/20/22   Mo'Rosanne Jones MD   albuterol sulfate HFA (PROVENTIL HFA) 108 (90 Base) MCG/ACT inhaler Inhale 2 puffs into the lungs every 4 hours as needed for Wheezing 12/12/22 12/12/23  Estela Session, MD   ondansetron (ZOFRAN) 4 MG tablet Take 1 tablet by mouth daily as needed for Nausea or Vomiting 12/12/22   Estela Session, MD   busPIRone (BUSPAR) 10 MG tablet TAKE 1 TABLET BY MOUTH 3 TIMES A DAY 12/8/22   Estela Session, MD   benzonatate (TESSALON PERLES) 100 MG capsule Take 1 capsule by mouth every 6 hours as needed for Cough 12/1/22 12/1/23  Estela Session, MD   glipiZIDE (GLUCOTROL) 10 MG tablet TAKE 1 TABLET BY MOUTH TWO TIMES A DAY 11/30/22   Estela Session, MD   sertraline (ZOLOFT) 50 MG tablet TAKE ONE TABLET BY MOUTH EVERY MORNING 11/30/22   Estela Session, MD   omeprazole (PRILOSEC) 20 MG delayed release capsule TAKE 1 CAPSULE BY MOUTH 2 TIMES A DAY 11/30/22   Estela Session, MD   lisinopril (PRINIVIL;ZESTRIL) 40 MG tablet TAKE 1 TABLET BY MOUTH ONE TIME A DAY 11/30/22   Estela Session, MD   Prodigy Lancets 28G MISC 100 Lancet by Does not apply route three times daily 11/30/22   Tanesha Cardoza MD   methocarbamol (ROBAXIN) 750 MG tablet  11/8/22   Historical Provider, MD   Prodigy Lancets 28G MISC 1 Bottle by Does not apply route three times daily 11/20/22   Kit Brenda,    ASPIRIN LOW DOSE 81 MG EC tablet TAKE 1 TABLET BY MOUTH ONE TIME A DAY 11/8/22   Reyes Revel, MD   celecoxib (CELEBREX) 100 MG capsule Take 100 mg by mouth 2 times daily    Historical Provider, MD   glipiZIDE (GLUCOTROL) 10 MG tablet Take 10 mg by mouth 2 times daily (before meals)    Historical Provider, MD   furosemide (LASIX) 20 MG tablet Take 20 mg by mouth daily    Historical Provider, MD   bimatoprost (LUMIGAN) 0.01 % SOLN ophthalmic drops Place 1 drop into both eyes nightly    Historical Provider, MD   omeprazole (PRILOSEC) 20 MG delayed release capsule Take 20 mg by mouth daily    Historical Provider, MD   rOPINIRole (REQUIP) 2 MG tablet Take 2 mg by mouth nightly    Historical Provider, MD   atorvastatin (LIPITOR) 40 MG tablet Take 40 mg by mouth nightly    Historical Provider, MD   sertraline (ZOLOFT) 50 MG tablet Take 50 mg by mouth daily    Historical Provider, MD   Empagliflozin-metFORMIN HCl (SYNJARDY) 5-1000 MG TABS Take 1,000 mg by mouth 2 times daily (before meals)    Historical Provider, MD   Dulaglutide (TRULICITY) 1.5 IX/3.1SM SOPN Inject 1.5 mg into the skin once a week    Historical Provider, MD   albuterol sulfate HFA (PROVENTIL;VENTOLIN;PROAIR) 108 (90 Base) MCG/ACT inhaler Inhale 2 puffs into the lungs every 6 hours as needed for Wheezing    Historical Provider, MD   diclofenac sodium (VOLTAREN) 1 % GEL Apply 2 g topically as needed for Pain    Historical Provider, MD   docusate sodium (COLACE) 100 MG capsule Take 100 mg by mouth 2 times daily as needed for Constipation    Historical Provider, MD   cyclobenzaprine (FLEXERIL) 10 MG tablet Take 10 mg by mouth 3 times daily as needed for Muscle spasms    Historical Provider, MD rOPINIRole (REQUIP) 2 MG tablet  8/29/22   Historical Provider, MD   TRULICITY 1.5 NN/8.9IA SOPN INJECT THE CONTENTS OF ONE SYRINGE UNDER THE SKIN ONCE WEEKLY  Patient taking differently: Inject 1.5 mg into the skin once a week Saturdays 9/21/22   Nhan Bell MD   celecoxib (CELEBREX) 100 MG capsule TAKE 1 CAPSULE BY MOUTH 2 TIMES A DAY 9/8/22   Tin Morris MD   SYNJARDY 5-1000 MG TABS TAKE 1 TABLET BY MOUTH 2 TIMES A DAY 8/30/22   Nhan Bell MD   Elastic Bandages & Supports (ELBOW BRACE) MISC 1 each by Does not apply route at bedtime Please dispense left cubital tunnel brace 8/18/22 11/16/22  ORLY Whelan - CNP   alendronate (FOSAMAX) 70 MG tablet Take 1 tablet by mouth every 7 days  Patient taking differently: Take 70 mg by mouth every 7 days Wednesdays 7/29/22   Cathy Lyn MD   oxyCODONE-acetaminophen (PERCOCET) 5-325 MG per tablet Take by mouth every 4 hours as needed for Pain. Historical Provider, MD   atorvastatin (LIPITOR) 40 MG tablet Take 1 tablet by mouth at bedtime 7/15/22   Farhad Escudero MD   furosemide (LASIX) 20 MG tablet Take 0.5 tablets by mouth every other day 7/15/22   Farhad Escudero MD   blood glucose test strips (PRODIGY NO CODING BLOOD GLUC) strip Use to test once daily and as needed as directed by provider. Please dispense Prodigy brand. 7/12/22   Nhan Bell MD   blood glucose monitor kit and supplies Dispense sufficient amount for indicated testing frequency plus additional to accommodate PRN testing needs. Dispense all needed supplies to include: monitor, strips, lancing device, lancets, control solutions, alcohol swabs.    Prodigy Brand 7/6/22   Nhan Bell MD   repaglinide (PRANDIN) 1 MG tablet TAKE 1 TABLET BY MOUTH 3 TIMES A DAY BEFORE MEALS 12/8/20   Nhan Bell MD   bimatoprost (LUMIGAN) 0.01 % SOLN ophthalmic drops Place 1 drop into both eyes nightly 4/26/18   Historical Provider, MD   Blood Glucose Monitoring Suppl (PRODIGY AUTOCODE BLOOD GLUCOSE) w/Device KIT 1 Device by Does not apply route 3 times daily 4/26/19   Alda Fields MD       Future Appointments   Date Time Provider Nicola Zaidi   12/29/2022  9:30 AM Alda Fields MD [address] [facility]   12/29/2022  2:30 PM [facility], OT [facility]   12/30/2022  2:00 PM [facility], OT [facility]   1/20/2023 10:30 AM DO Artemio Easton Neuro [facility]   2/15/2023  3:30 PM Mo'Men Zoraida Lesch, MD Neuro [facility] Neurology -

## 2022-12-28 NOTE — PROGRESS NOTES
[address]         Phone: [phone]       Fax: [phone]     Occupational Therapy Daily Treatment note     Occupational Therapy: Daily Note   Patient: Ayan Sanchez (20 y.o. female)   Examination Date: 2022  No data recorded  Progress Note Counter: MD appt in 2023     :  1960 # of Visits since [facility]:   8   MRN: [id number]  CSN: [id number] Start of Care Date: 2022   Insurance: Payor: Reba [address]rb[address] 150 / Plan: [employer] [address] / Product Type: *No Product type* /   Insurance ID: [id number] - ([place] [employer]) Secondary Insurance (if applicable): Insurance Information: [employer] (pt has 10 visits left on her insurance till the end of the year). 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 Pt states her steri strips fell off yesterday. Pt states her lt leg pain is 6. Pt states she cut up fruit for fruit salad on the holiday.     HEP Compliance: Good        OBJECTIVE EXAMINATION   Restrictions: Restrictions/Precautions: Up as Tolerated; General Precautions     Position Activity Restriction  Other position/activity restrictions: Per pt lifting restriction 5# or  less. TREATMENT     Exercises:     Treatment Reasoning    OT Exercise 1: PROM lt shoulder elbow ,wrist, hand. ,Lt hand finger blocking exercises 1 sets 25x each (PIP flex/extension, DiP flex/extension ), scar massage lt elbow  OT Exercise 3: lt hand based skateboard on table : 2 sets 20x each way (reaching forward/back, side to side, circles cw & ccw, figure 8)  OT Exercise 4: UE 1# cane exercises  2 sets 10x each way ( shoulder circles cw & ccw, shoulder horizontal abduction/adduction to rt/lt sides, elbow fle/extension )  OT Exercise 5: cone stacking (10 cones) using lt UE: stack & unstack (move cones from rt & lt side), pass cone over lt shoulder  OT Exercise 6: Ulnar nerve glide 2x10   Shoulder 90 w/ elbow flex/ext ,AROM 3 sets of 10x ( lt elbow flex/extension, lt forearm supination/pronation)  OT Exercise 9: key pegs :using lt hand place all 25 in board, then remove pegs & hold in hand. OT Exercise 13: Yellow theraband Flexbar:  Wrist Flexion/Ext,  Wrist pronation , Wrist supination,  Wrist ulnar deviation,  Wrist radial dev,  make C,  make backward C  2x10  Complete  OT Exercise 16: lt wrist over ramp palm down wrist flex/extension : 1#  3 sets 10x, lt elbow flex/extension 1# 3 sets 10x  OT Exercise 17: press cone in green putty using lt UE: 21x each way ( holding small end cone, holding large end cone)    Limitations addressed: Coordination, Flexibility, Strength  Limitations addressed: lt UE rom, coordination  Therapist provided: Verbal cuing  Progressed: Complexity of movement  Progressed: Begun 1# strengthening  for lt wrist & lt elbow, cone in putty exercise. Begun scar massage  lt elbow . Patient Education: OT Education: Home Exercise Program  Patient Education: OT demo to pt how she can have her kids help with her finger blocking exercises.  OT told pt that she can progress to using 1# wt this weekend for HEP wrist  & elbow strengthening for flexion/extension. ASSESSMENT     Assessment: Assessment: OT upgraded pt's lt UE HEP - see pt education for details. Begun scar massage on lt elbow scar. Tx inlcudes lt UE PROM/AROM, ulnar nerve glides, coordination, & light strengthening exercises. Progressed pt to lt UE (wrist & elbow ) strengthening using 1# with pt reporting her arm tired afterward. Pt manipulates key pegs using lt hand slowly. See OT exercises for details.   Performance deficits / Impairments: Decreased ROM, Decreased strength, Decreased fine motor control, Decreased high-level IADLs, Decreased sensation    Post-Treatment Pain Level: 1    Prognosis: Good    Activity Tolerance: Patient tolerated treatment well       Barriers impacting rehab : None     TREATMENT PLAN   REQUIRES OT FOLLOW-UP: Yes  Type: Outpatient  Plan  Plan Frequency: 2-3x/week  Plan Weeks: 10 visits (pt has 10 visits left on insurance for this year)  Current Treatment Recommendations: ROM, Patient/Caregiver education & training, Coordination training, Strengthening  Additional Comments: continue OT       Therapy Time  Individual Time In: 3611  Individual Time Out: 1240  Minutes: 49  Timed Code Treatment Minutes: 49 Minutes       Electronically signed by Sujit Pritchett OT  on 12/28/2022 at 4:29 PM       Treatment Charges: Mins Units Time In/Out   [] Evaluation       []  Low       []  Moderate       []  High      []  Electrical Stim      []  Iontophoresis      []  Paraffin      []  Ultrasound        []  Masage      []  Neuromuscular Re-ed      []  ADL      [x]  Ther Exercise 49 3    []  Ther Activities      []  Neuro Re-Ed      []  Splinting      []  Other      Total Treatment time 49 min 3          POC NOTE

## 2022-12-29 ENCOUNTER — HOSPITAL ENCOUNTER (OUTPATIENT)
Dept: OCCUPATIONAL THERAPY | Age: 62
Setting detail: THERAPIES SERIES
Discharge: HOME | End: 2022-12-29
Payer: COMMERCIAL

## 2022-12-29 ENCOUNTER — TELEPHONE (OUTPATIENT)
Dept: NEUROSURGERY | Age: 62
End: 2022-12-29

## 2022-12-29 ENCOUNTER — HOSPITAL ENCOUNTER (OUTPATIENT)
Dept: GENERAL RADIOLOGY | Facility: CLINIC | Age: 62
Discharge: HOME | End: 2022-12-31
Payer: COMMERCIAL

## 2022-12-29 ENCOUNTER — TELEPHONE (OUTPATIENT)
Dept: FAMILY MEDICINE CLINIC | Age: 62
End: 2022-12-29

## 2022-12-29 ENCOUNTER — TELEMEDICINE (OUTPATIENT)
Dept: FAMILY MEDICINE CLINIC | Age: 62
End: 2022-12-29
Payer: COMMERCIAL

## 2022-12-29 ENCOUNTER — HOSPITAL ENCOUNTER (OUTPATIENT)
Facility: CLINIC | Age: 62
Discharge: HOME | End: 2022-12-31
Payer: COMMERCIAL

## 2022-12-29 DIAGNOSIS — E11.9 TYPE 2 DIABETES MELLITUS WITHOUT COMPLICATION, WITHOUT LONG-TERM CURRENT USE OF INSULIN (HCC): ICD-10-CM

## 2022-12-29 DIAGNOSIS — M25.551 HIP PAIN, CHRONIC, RIGHT: Primary | ICD-10-CM

## 2022-12-29 DIAGNOSIS — G89.29 HIP PAIN, CHRONIC, RIGHT: ICD-10-CM

## 2022-12-29 DIAGNOSIS — G89.29 HIP PAIN, CHRONIC, RIGHT: Primary | ICD-10-CM

## 2022-12-29 DIAGNOSIS — M25.551 HIP PAIN, CHRONIC, RIGHT: ICD-10-CM

## 2022-12-29 PROCEDURE — 97110 THERAPEUTIC EXERCISES: CPT

## 2022-12-29 PROCEDURE — 73502 X-RAY EXAM HIP UNI 2-3 VIEWS: CPT

## 2022-12-29 PROCEDURE — 99443 PR PHYS/QHP TELEPHONE EVALUATION 21-30 MIN: CPT | Performed by: FAMILY MEDICINE

## 2022-12-29 RX ORDER — BENZONATATE 100 MG/1
100 CAPSULE ORAL EVERY 6 HOURS PRN
Qty: 28 CAPSULE | Refills: 1 | Status: SHIPPED | OUTPATIENT
Start: 2022-12-29 | End: 2023-12-29

## 2022-12-29 RX ORDER — FUROSEMIDE 20 MG/1
20 TABLET ORAL DAILY
Qty: 90 TABLET | Refills: 3 | Status: SHIPPED | OUTPATIENT
Start: 2022-12-29

## 2022-12-29 RX ORDER — AMOXICILLIN AND CLAVULANATE POTASSIUM 500; 125 MG/1; MG/1
1 TABLET, FILM COATED ORAL 3 TIMES DAILY
Qty: 30 TABLET | Refills: 0 | Status: SHIPPED | OUTPATIENT
Start: 2022-12-29 | End: 2023-01-08

## 2022-12-29 RX ORDER — SERTRALINE HYDROCHLORIDE 100 MG/1
100 TABLET, FILM COATED ORAL DAILY
Qty: 90 TABLET | Refills: 3 | Status: SHIPPED | OUTPATIENT
Start: 2022-12-29

## 2022-12-29 RX ORDER — LORATADINE AND PSEUDOEPHEDRINE SULFATE 10; 240 MG/1; MG/1
1 TABLET, EXTENDED RELEASE ORAL DAILY
Qty: 14 TABLET | Refills: 3 | Status: SHIPPED | OUTPATIENT
Start: 2022-12-29

## 2022-12-29 RX ORDER — ROPINIROLE 2 MG/1
4 TABLET, FILM COATED ORAL NIGHTLY
Qty: 90 TABLET | Refills: 3 | Status: SHIPPED | OUTPATIENT
Start: 2022-12-29

## 2022-12-29 RX ORDER — BLOOD SUGAR DIAGNOSTIC
STRIP MISCELLANEOUS
Qty: 100 EACH | Refills: 5 | Status: SHIPPED | OUTPATIENT
Start: 2022-12-29

## 2022-12-29 RX ORDER — BUSPIRONE HYDROCHLORIDE 10 MG/1
10 TABLET ORAL 3 TIMES DAILY
Qty: 90 TABLET | Refills: 3 | Status: SHIPPED | OUTPATIENT
Start: 2022-12-29 | End: 2023-01-28

## 2022-12-29 NOTE — PROGRESS NOTES
Visit Information    Have you changed or started any medications since your last visit including any over-the-counter medicines, vitamins, or herbal medicines? no   Have you stopped taking any of your medications? Is so, why? -  no  Are you having any side effects from any of your medications? - no    Have you seen any other physician or provider since your last visit? yes - Nephrology, OT, Neurosurgery, Neurology, Pain Management   Have you had any other diagnostic tests since your last visit? yes - Labs   Have you been seen in the emergency room and/or had an admission in a hospital since we last saw you?  yes - no  Have you had your routine dental cleaning in the past 6 months?  no    Do you have an active MyChart account? If no, what is the barrier?   Yes    Patient Care Team:  Monica Baxter MD as PCP - General (Family Medicine)  Monica Baxter MD as PCP - [facility] Provider  Alex Phillips MD as Consulting Physician (Gastroenterology)    Medical History Review  Past Medical, Family, and Social History reviewed and does not contribute to the patient presenting condition    Health Maintenance   Topic Date Due    Shingles vaccine (1 of 2) Never done    Diabetic retinal exam  11/01/2017    Diabetic foot exam  12/01/2017    COVID-19 Vaccine (3 - Booster for John Leatha series) 04/08/2021    Diabetic Alb to Cr ratio (uACR) test  06/11/2022    Lipids  06/24/2022    Breast cancer screen  11/14/2022    Depression Monitoring  05/26/2023    A1C test (Diabetic or Prediabetic)  11/08/2023    GFR test (Diabetes, CKD 3-4, OR last GFR 15-59)  12/15/2023    Colorectal Cancer Screen  11/22/2025    DTaP/Tdap/Td vaccine (3 - Td or Tdap) 08/25/2032    Flu vaccine  Completed    Pneumococcal 0-64 years Vaccine  Completed    Hepatitis C screen  Completed    HIV screen  Completed    Hepatitis A vaccine  Aged Out    Hib vaccine  Aged Out    Meningococcal (ACWY) vaccine  Aged Out

## 2022-12-29 NOTE — TELEPHONE ENCOUNTER
[employer]-Term Disability paperwork was received. Blank copy of form has been scanned.  Patient notified it will take up to 7-10 business days for completion

## 2022-12-29 NOTE — TELEPHONE ENCOUNTER
It can take time for the nerves to heal once they have been decompressed. Continue working with occupational therapy, follow up with Dr Eduardo Aguilera as planned.

## 2022-12-29 NOTE — TELEPHONE ENCOUNTER
Wound Care Progress Note    Does the patient OR patient’s household members have any of the following symptoms?  · Temperature: Fever >100.4°F or >38.0°C?  No  · Respiratory symptoms: New or worsening congestion, runny nose, cough, sore throat, shortness of breath, or difficulty breathing? No  · GI symptoms: New onset of nausea, vomiting or diarrhea?  No  · Miscellaneous: New onset of loss of taste or smell, chills, muscle or body aches/pains, fatigue or headache?  No  Has the patient or a household member tested positive for COVID-19 in the last 14 days?  No  Has the patient or a household member been tested for COVID-19 and awaiting results? No    Chief Complaint   Chief Complaint   Patient presents with   • Wound     Nephew reports changing dressing every 2 days. Was not sure if he should remove honey gauze or not.                                                        Services Activity   Requested For: (P) Patient   Modality Used: (P) Patient Refused [facility]   Wound Assessments      Wound Leg Left Medial Venous Ulcer (Active)   Date First Assessed: 08/19/21   Present on Hospital Admission: Yes  Location: Leg  Laterality: Left  Modifier: Medial  Level of Skin Injury: Full Thickness  Primary Wound Type: Venous Ulcer  Wound Approximate Age at First Assessment (Weeks): 2 weeks      Assessments 8/19/2021 10:43 AM 9/2/2021 10:42 AM   Wound Image       Wound Care Team Consult Date 08/19/21 --   Dressing Assessment Saturated --   Dressing Activity Changed Changed   Dressing Changed On   08/19/21 09/02/21   Wound Exudate Moderate;No odor;Serous Moderate;Serosanguineous;No odor   Wound Bed/Tissue Type Granulated;Pink;Yellow;Necrotic tissue, slough Pink;Yellow;Necrotic tissue, slough   Periwound Condition Edema;Hyperpigmented Edema;Hyperpigmented   Wound Edge Well defined;Attached to wound bed Well defined;Attached to wound bed   Wound Status Improving Improving   Wound Last Measured  busPIRone (BUSPAR) 10 MG tablet    Sig: Take 1 tablet by mouth 3 times daily Take 1 1/2 tab three times daily. Patient [facility] pharmacy called wanting to know which sig is the right one.  Thank you 08/19/21 09/02/21   Wound Length (cm) 4.1 cm 3.7 cm   Wound Width (cm) 2.4 cm 1.9 cm   Wound Depth (cm) 0.1 cm 0.1 cm   Wound Surface Area (cm^2) 9.84 cm^2 7.03 cm^2   Wound Volume (cm^3) 0.984 cm^3 0.703 cm^3   PhotoTaken? Yes Yes   Debridement -- Non-selective (Autolytic, Mechanical, Enzymatic)   Debridement Percentage (%) -- 100   Post-Procedure Length (cm) -- 3.7 cm   Post-Procedure Width (cm) -- 1.9 cm   Post-Procedure Depth (cm) -- 0.1 cm   Post-Procedure Surface Area (cm^2) -- 7.03 cm^2   Post-Procedure Volume (cm^3) -- 0.703 cm^3   Wound Volume Change (Initial) -- -0.28 cm3   Wound Volume % Change (Initial) -- -28.56 %       No Linked orders to display         Treatment  Wound Location: LLE   Treatment Type: Compression, Debridement  Compression  Lotion applied: Yes  Stockinette size applied: 3 inch  Laterality: Bilateral  Compression Applied to: Ankle to tibial tubercle, Toe bases to proximal ankle  Compression Used: Velcro compression garment (circ aid juxta lite)  Compression Time Spent (min): 5  Debridement  Photo Taken: Yes  Wound Cleansing : Dermal wound cleanser  Primary Wound Dressing: Honey gauze, Mepilex Ag 4 X 8  Dressing Securement: Paper tape  Dressing Time Spent (min): 15  Debridement Type: Non-selective     ASSESSMENT  Wound is reducing in surface area. No s/s infection present. Still with 3+ pitting edema however they report using velcro garments daily since discussion at last visit. Demonstrated appropriate donning technique and recommended continued garment use during the day with removal at night.    Plan  Continue weekly for wound assessment, advanced dressings, debridement as needed and compression. Recommended dressing be changed once between today and next visit as exudate has reduced and more frequent dressing changes are not necessary.     Prema Bob, PT, DPT, CWS, CLT  9/2/2021

## 2022-12-29 NOTE — TELEPHONE ENCOUNTER
Patient stopped at the [place] office with Disability documents, (*see telephone encounter/media*) and also to report that her left hand is not getting any better. She states that she is unable to make a fist, and the pain, numbness, and tingling is bothersome.

## 2022-12-29 NOTE — PROGRESS NOTES
Ayan Sanchez is a 65 y.o. female evaluated via telephone on 12/29/2022 for Cough (Lingering cough), Discuss Medications, Leg Pain (Left leg pain - 7/10 pain today), and Hip Pain (Right hip pain - 6/10 pain today)  . Documentation:  I communicated with the patient and/or health care decision maker about the following. Details of this discussion including any medical advice provided: Following  Decreased energy/Head cold/nasal congestion lingering after a cold in November  MIKKI- increased Buspar to 1 1/2 tab tid/Zoloft 100 mg po daily  DM- Supplies added  CKD- seen Nephro- Stage 2 CKD  Medication refills given  R hip pain- difficulty raising- X ray ordered. Continue with OTC NSAIDs  Total Time: minutes: 21-30 minutes    Kavya De Santiago was evaluated through a synchronous (real-time) audio encounter. Patient identification was verified at the start of the visit. She (or guardian if applicable) is aware that this is a billable service, which includes applicable co-pays. This visit was conducted with the patient's (and/or legal guardian's) verbal consent. She has not had a related appointment within my department in the past 7 days or scheduled within the next 24 hours. The patient was located at Home: [address] [place] [address]. The provider was located at [place] [address] (Appt Dept): [address] [place] [address].     Note: not billable if this call serves to triage the patient into an appointment for the relevant concern    Tatiana Cobb MD

## 2022-12-29 NOTE — FLOWSHEET NOTE
[] [place]       Occupational Therapy            1st floor       [address] [place], [place]         Phone: [phone]       Fax: [phone]  [x] [facility] @ [facility]  [address] [facility] [address]  [place], Sly Schro[address]  Phone [phone]  Fax [phone]       Occupational Therapy Daily Treatment Note    Date:  2022  Patient Name:  Iliana Blackwell    :  1960  MRN: [id number]  Physician: MD Naila Wise APRN-CNP     Insurance: [employer] / Plan: [facility] for 10 remaining visits  Diagnosis: Ulnar neuropathy at elbow, left [G56.22] Ulnar neuropathy at elbow lt (G56.22 -ICD-10 code)   Onset Date: 22   Next [address]: 23  Visit# / total visits: 9/10; Progress note for Medicare patient due at visit 8-9    Cancels/No Shows: 1/0      Subjective:    Pain:  [] Yes  [x] No Location:  N/A Pain Rating: (0-10 scale) 4/10  Pain altered Tx:  [x] No  [] Yes  Action:  Pt Comments: Pt reports she is in more pn today after over doing it at OT yesterday. Pt states her hand is numb and tingling.        Objective:  Modalities:  Precautions:  Exercises:  EXERCISE    REPS/     TIME  WEIGHT/    LEVEL COMMENTS   Elbow AROM 3x10 AROM Flexion/extension   Pronation/supination 3x10 AROM Completed    Gripping 3x10  Digiflex (yellow)  Metal gripper (15lbs)   Resistive clips  2x6  Yellow (6), Red (6) 2 times    Foam pegs   Completed  Full board    Key pegs   Completed   Full board    Ulnar nerve glide 2x10 AROM Completed   Flexbar  Wrist Flexion/Ext  Wrist pronation  Wrist supination  Wrist ulnar deviation  Wrist radial dev 2x10 Yellow Complete   gripping 3x10  Digiflex (yellow)  Metal gripper (15lbs)   Other:      Treatment Charges: Mins Units   []  Modalities:      []  Ultrasound     [x]  Ther Exercise 38 3   []  Manual Therapy     []  Ther Activities     []  Orthotic fit/train     []  Orthotic recheck     []  Other Total Treatment time 38 mins 3         Assessment: [x] Progressing toward goals. Dicussed w/ pt decreasing amount of exercises she is completing at home if she is feeling sore or painful. Pt reported understanding. Completed scar massage over medial L elbow scar. Educated pt on completing scar massage and desensitization at home. Completed light  strengthening and ulnar nerve glides. Concluded session early on this date as pt will return the next day for therapy and is still c/o increased pn, pt tolerated tx well on this date. [] No change. [] Other     [x] Patient would continue to benefit from skilled occupational therapy services in order to: improve complaint of pain, numbness and tingling, improve strength for the completion of ADLs/IADLs    STG/LTG  Short Term Goals Completed by 5 visits  Pt will demo & report modified independence with lt UE HEP   Pt will demo improved lt hand (index,middle,ring, little)  AROM (flexion to [facility] ) by 1cm to improve grasp objects 1/2\" to 1\" diameter. Increase AROM lt UE: shoulder (flexion, abduction, ER) by 5, wrist extension by 5-10 to improve reach into ccabinets   Pt will demo improved lt hand dexterity by completing 9 hole peg test 5 seconds quicker than eval.      Long Term Goals Completed by 10 visits   Using the UEFI  pt will report less difficulty using lt UE for ADL/light IADLs ( getting dressed, hair grooming, button manipulation, laundry, opening jars) & increase in her total  UEFI score by 10 points   Compared to eval pt will complete 9 hole peg test 10 seconds quicker using lt hand. Pt will reports less difficulty with fine motor ADLs (hooking her bra)   Increase lt hand AROM & grasp -compared to eval  pt will flex fingers 2cm better. Assess lt hand strength & upgrade HEP . Increase AROM lt shoulder by 10 (flexion, abduction ) for reaching during daily household activities.        Pt. Education:  [] Yes  [] No  [x] Reviewed Prior HEP/Ed  Method of Education: [] Verbal  [] Demo  [] Written  Re:  Comprehension of Education:  [] Verbalizes understanding. [] Demonstrates understanding. [] Needs review. [] Demonstrates/verbalizes HEP/Ed previously given. Plan: [x] Continue current frequency toward short and long term goals.   [x] Specific Instructions for subsequent treatments: Cont AROM exercises    [] Other:       Time In: 1430  Time Out: 1510       Electronically signed by:  YAAKOV Avery/CARMEN

## 2022-12-29 NOTE — PATIENT INSTRUCTIONS
Thank you for letting us take care of you today. We hope all your questions were addressed. If a question was overlooked or something else comes to mind after you return home, please contact a member of your Care Team listed below. Your Care Team at [place] [address] is Team #1  Gloria Childers MD (Faculty)  Prosper Lemon MD(Resident)  Nicolasa Jones MD (Resident)  Enid Zabala DO (Resident)  CHIVO Jones., CHIVO Lepe., SHERRY Bustillo, [address] [facility] office)  Tameka Seals, [address] (Clinical Practice Manager)  Ethan Quispe, [address] [place] [address] (Clinical Pharmacist)     Office phone number: [phone]    If you need to get in right away due to illness, please be advised we have \"Same Day\" appointments available Monday-Friday. Please call us at [phone] option #3 to schedule your \"Same Day\" appointment.

## 2022-12-30 ENCOUNTER — HOSPITAL ENCOUNTER (OUTPATIENT)
Dept: OCCUPATIONAL THERAPY | Age: 62
Setting detail: THERAPIES SERIES
Discharge: HOME | End: 2022-12-30
Payer: COMMERCIAL

## 2022-12-30 PROCEDURE — 97110 THERAPEUTIC EXERCISES: CPT

## 2022-12-30 NOTE — TELEPHONE ENCOUNTER
E-scribe request for med refill. Please review and e-scribe if applicable. Last Visit Date:  12/29/2022  Next Visit Date:  Visit date not found    Hemoglobin A1C (%)   Date Value   11/08/2022 7.7   08/25/2022 6.9   05/26/2022 7.9             ( goal A1C is < 7)   Microalb/Crt.  Ratio (mcg/mg creat)   Date Value   09/22/2022 Can not be calculated     LDL Cholesterol (mg/dL)   Date Value   06/24/2021 52       (goal LDL is <100)   AST (U/L)   Date Value   10/04/2022 35 (H)     ALT (U/L)   Date Value   10/04/2022 58 (H)     BUN (mg/dL)   Date Value   12/15/2022 17     BP Readings from Last 3 Encounters:   12/28/22 138/80   12/08/22 110/73   11/23/22 128/84          (goal 120/80)        Patient Active Problem List:     Cervical spondylosis     Type 2 diabetes mellitus without complication, without long-term current use of insulin (HCC)     Hypertension     Abnormal auditory perception     Nasal polyps     Osteoarthritis of left knee     Osteoarthritis of right knee     DIEGO (nonalcoholic steatohepatitis)     Vitamin D deficiency     Iron deficiency anemia     Dyslipidemia     Trochanteric bursitis     Chronic left shoulder pain     Restless legs syndrome     Generalized anxiety disorder     Toxic metabolic encephalopathy     Class 2 obesity in adult     Leg edema     Recurrent major depressive disorder (HCC)     Dermatitis     Retrolisthesis of vertebrae     Bilateral carpal tunnel syndrome     Intention tremor     Sciatica     Back pain with radiation     Left sciatic nerve pain     Lumbar disc disease with radiculopathy     Intractable back pain     Piriformis syndrome of left side     Ulnar neuropathy at elbow, left     S/P carpal tunnel release      ----ESTEVAN

## 2022-12-30 NOTE — DISCHARGE SUMMARY
[] [place]       Occupational Therapy             1st floor       [address] [place], [place]         Phone: [phone]       Fax: [phone]  [x] [facility] @ [facility]  [address] [facility] [address]  [place] [address]  Phone [phone]  Fax [phone]       Occupational Therapy Discharge Note    Date: 2022      Patient: Martha Leon  : 1960  MRN: [id number]    Physician: MD Yari Hui APRN-CNP                              Insurance: [facility] / Plan: [facility] for 10 remaining visits  Diagnosis: Ulnar neuropathy at elbow, left [G56.22] Ulnar neuropathy at elbow lt (G56.22 -ICD-10 code)           Onset Date: 22   Next Dr. Della Sue: 23  Visit# / total visits: 10/10; Progress note for Medicare patient due at visit 8-9                     Total visits attended:  Cancels/No shows: 10  Date of initial visit: 22                Date of final visit: 22      Subjective:  Pain:  [] Yes  [] No  Location: L elbow Pain Rating: (0-10 scale) 3/10  Pain altered Tx:  [] No  [] Yes  Action:  Comments:    Objective:  Test Measurements:     Function:  UE ROM/MMT   Right Left MMT Right MMT Left   Shoulder       Flexion [facility] 4/5 3+/5   Extension [facility] 4/5 3+/5   Abduction [facility] 4/5 3+/5   Internal Rotation [facility] 4/5 3+/5   External Rotation [facility] WFL 4/5 3+5   Elbow       Flexion [facility] WFL 4/5 4-/5   Extension [facility] WFL 4 4-/5   Pronation [facility] WFL 4 4-/5   Supination  [facility] WFL  4-/5   Wrist       Flexion [facility] WFL  4/5   Extension [facility] WF  4/5   Radial deviation [facility] WF  4/5   Ulnar deviation [facility] WF  4/5      COORDINATION  - Fine Motor (speed/dexterity)     Right in seconds Percentile Left in seconds Percentile   9 Hole Peg Test 30 seconds  50 seconds        Assessment:  Short Term Goals Completed by 5 visits  Pt will demo & report modified independence with lt UE HEP MET  Pt will demo improved lt hand (index,middle,ring, little)  AROM (flexion to [facility] ) by 1cm to improve grasp objects 1/2\" to 1\" diameter. MET          Increase AROM lt UE: shoulder (flexion, abduction, ER) by 5, wrist extension by 5-10 to improve reach into cabinets MET              Pt will demo improved lt hand dexterity by completing 9 hole peg test 5 seconds quicker than eval.  MET     Long Term Goals Completed by 10 visits    Using the UEFI  pt will report less difficulty using lt UE for ADL/light IADLs ( getting dressed, hair grooming, button manipulation, laundry, opening jars) & increase in her total  UEFI score by 10 points             Compared to eval pt will complete 9 hole peg test 10 seconds quicker using lt hand. Pt will reports less difficulty with fine motor ADLs (hooking her bra) NOT MET  Increase lt hand AROM & grasp -compared to eval  pt will flex fingers 2cm better. MET      Assess lt hand strength & upgrade HEP .NOT MET   Increase AROM lt shoulder by 10 (flexion, abduction ) for reaching during daily household activities. MET    Treatment to Date:     [x]  Therapeutic Exercise   79650              []  Iontophoresis: 4 mg/mL Dexamethasone Sodium Phosphate  mAmin  57193    [x]  Therapeutic Activity  66450  []  Vasopneumatic cold with compression  67100                []  ADL training  60661  []  Ultrasound        61845    []  Neuromuscular Re-education  14036  []  Electrical Stimulation Attended  64889    [x]  Manual Therapy  24018  Orthotic    []  Fit  86313     []  Train 49185    []  Instruction in HEP        Prosthetic    []  Fit 35708      []  Train [id number]    []  Cognitive Interventions, (first 15 min 25474, subsequent 15 min 99326)  []  Cold/hotpack      [x]  Massage   24153             Discharge Status:     [] Pt recovered from conditions. Treatment goals were met. [] Pt received maximum benefit. No further therapy indicated at this time.     [] Pt to continue exercise/home instructions independently. [] Therapy interrupted due to:    [] Pt has 2 or more no shows/cancels, is discontinued per our policy. [x] Pt has completed prescribed number of treatment sessions. [x] Other: Pt completed maximum number of auth visits for the year. Pt's insurance ends on 12/31/22 and does not have insurance to cont OT. Pt advised to contact clinic if/when she starts new insurance and wishes to cont OT. Electronically signed by: Shay King OTR/L    If you have any questions or concerns, please don't hesitate to call.   Thank you for your referral.

## 2022-12-30 NOTE — FLOWSHEET NOTE
time 40 mins 3         Assessment: [x] Progressing toward goals. Formal measurements taken on this date. See DC note dated 12/30/22 for more details. Discussed POC w/ pt for her to cont HEP independently. Pt complete light strengthening and FMC exercises. Dicussed POC for pt to cont HEP d/u insurance ending on 12/31/22. pt tolerated tx well on this date. [] No change. [] Other     [x] Patient would continue to benefit from skilled occupational therapy services in order to: improve complaint of pain, numbness and tingling, improve strength for the completion of ADLs/IADLs    STG/LTG  Short Term Goals Completed by 5 visits  Pt will demo & report modified independence with lt UE HEP MET  Pt will demo improved lt hand (index,middle,ring, little)  AROM (flexion to [facility] ) by 1cm to improve grasp objects 1/2\" to 1\" diameter. MET   Increase AROM lt UE: shoulder (flexion, abduction, ER) by 5, wrist extension by 5-10 to improve reach into cabinets MET   Pt will demo improved lt hand dexterity by completing 9 hole peg test 5 seconds quicker than eval. MET     Long Term Goals Completed by 10 visits   Using the UEFI  pt will report less difficulty using lt UE for ADL/light IADLs ( getting dressed, hair grooming, button manipulation, laundry, opening jars) & increase in her total  UEFI score by 10 points   Compared to eval pt will complete 9 hole peg test 10 seconds quicker using lt hand. Pt will reports less difficulty with fine motor ADLs (hooking her bra) NOT MET  Increase lt hand AROM & grasp -compared to eval  pt will flex fingers 2cm better. MET   Assess lt hand strength & upgrade HEP .NOT MET   Increase AROM lt shoulder by 10 (flexion, abduction ) for reaching during daily household activities. MET      Pt. Education:  [] Yes  [] No  [x] Reviewed Prior HEP/Ed  Method of Education: [] Verbal  [] Demo  [] Written  Re:  Comprehension of Education:  [] Verbalizes understanding. [] Demonstrates understanding.   [] Needs review. [] Demonstrates/verbalizes HEP/Ed previously given. Plan: [x] Continue current frequency toward short and long term goals.   [x] Specific Instructions for subsequent treatments: Cont AROM exercises    [] Other:       Time In: 1400  Time Out: 0134       Electronically signed by:  Estela Wheeler OTR/CARMEN

## 2022-12-30 NOTE — TELEPHONE ENCOUNTER
Reached out to patient, she expressed understanding of the response from [place] [address] and appreciates the timely response!

## 2023-01-19 ENCOUNTER — TELEPHONE (OUTPATIENT)
Dept: PHARMACY | Facility: CLINIC | Age: 63
End: 2023-01-19

## 2023-01-19 NOTE — TELEPHONE ENCOUNTER
2023 Annual Pharmacist Visit  **Patient is Ensemble**    Called patient to schedule 2023 yearly pharmacist appointment to discuss medications for Diabetes Management Program.    Spoke to patient and she stated her [facility] benefits ran out on 12/31/22.  No longer employed     Sending encounter to PSS to update encounter      [address] [facility]  Department, toll free: [phone] Option #3    For Pharmacy Admin Tracking Only    Program: [address] in place:  No  Gap Closed?: No   Time Spent (min): 15

## 2023-01-20 ENCOUNTER — OFFICE VISIT (OUTPATIENT)
Dept: NEUROSURGERY | Age: 63
End: 2023-01-20
Payer: COMMERCIAL

## 2023-01-20 VITALS
OXYGEN SATURATION: 97 % | DIASTOLIC BLOOD PRESSURE: 76 MMHG | TEMPERATURE: 97.9 F | HEART RATE: 74 BPM | BODY MASS INDEX: 33.48 KG/M2 | RESPIRATION RATE: 22 BRPM | WEIGHT: 189 LBS | SYSTOLIC BLOOD PRESSURE: 114 MMHG

## 2023-01-20 DIAGNOSIS — M54.12 CERVICAL MYELOPATHY WITH CERVICAL RADICULOPATHY (HCC): Primary | ICD-10-CM

## 2023-01-20 DIAGNOSIS — G56.11 RIGHT MEDIAN NERVE NEUROPATHY: ICD-10-CM

## 2023-01-20 DIAGNOSIS — Z98.890 S/P DECOMPRESSION OF ULNAR NERVE AT ELBOW: ICD-10-CM

## 2023-01-20 DIAGNOSIS — G57.02 PIRIFORMIS SYNDROME OF LEFT SIDE: ICD-10-CM

## 2023-01-20 DIAGNOSIS — M47.816 LUMBAR SPONDYLOSIS: ICD-10-CM

## 2023-01-20 DIAGNOSIS — G95.9 CERVICAL MYELOPATHY WITH CERVICAL RADICULOPATHY (HCC): Primary | ICD-10-CM

## 2023-01-20 PROCEDURE — 3074F SYST BP LT 130 MM HG: CPT | Performed by: NEUROLOGICAL SURGERY

## 2023-01-20 PROCEDURE — 3078F DIAST BP <80 MM HG: CPT | Performed by: NEUROLOGICAL SURGERY

## 2023-01-20 PROCEDURE — 99214 OFFICE O/P EST MOD 30 MIN: CPT | Performed by: NEUROLOGICAL SURGERY

## 2023-01-20 NOTE — PROGRESS NOTES
Department of Neurosurgery                                                      Follow up visit      History Obtained From:  patient, spouse    CHIEF COMPLAINT:         Chief Complaint   Patient presents with    Follow-up    Post-Op Check    Numbness     Hand numbess       HISTORY OF PRESENT ILLNESS:       The patient is a 65 y.o. female who presents for follow up for 8 weeks s/p cubital tunnel decompression for cubital tunnel syndrome. Patient reports that her left hand is still in \"bad shape\" and is very weak. She is unable to make a fist with her left hand with some pain in wrist and knuckles when attempting a fist. Her numbness of the last 3 fingers and in the medial aspect of  has not improved at all since surgery. She states that she started OT 3 weeks postoperatively and has not noticed any improvement since being in OT. She reports 9 months of worsening weakness of the entire left arm with limited range of motion, including the shoulder and bicep. She denies any EMG testing postoperatively. She also reports pain and severe tenderness in the left piriformis and lateral aspect of the left thigh. She states that she saw Dr. Patel Dahl who administered 3 injections in the left piriformis, which improved pain for 2 weeks at a time. This pain is aggravated by sitting for a long time and walking. She states that she is very reliant on her cane due to leg weakness. She admits neck pain, which is aggravated by turning head from side to side. She states the leg and back pain are worse than the neck pain. She also admits balance trouble, where she feels like she is falling. She states she relies more and more on her cane with time. She reports a recent fall due to loss of balance where she fell on her back and hit her head and shoulders on the ground.     PAST MEDICAL HISTORY :       Past Medical History:        Diagnosis Date    Cervical spondylosis 04/2009    c-4 c-5, c-5 c-6, c-6 c-7    CKD (chronic kidney disease)     per pt, does not have nephrologist    COVID-19 12/06/2021    nasal congestion, fatique and myalgia x 3 weeks    Fall     PATIENT FELL 2 WEEKS AGO LANDED ON TAILBONE    Generalized anxiety disorder 12/08/2020    GERD (gastroesophageal reflux disease)     History of recent hospitalization 09/30/2022    til 10/9/22 at [facility]. V's    History of swelling of feet     lasix QOD for this    Hyperlipidemia     Hypertension     Left hand weakness     and pain, can not make fist d/t cubital tunnel issue    Lumbar radiculopathy 11/10/2021    was following with pain management and has had epidural injections    DIEGO (nonalcoholic steatohepatitis) 10/12/2014    Obesity     Osteoarthritis of left knee 08/19/2014    Recurrent major depressive disorder ([facility] [address] 07/15/2022    Restless legs syndrome     Snores 06/06/2022    was seen by Dr. Yuri Appiah for likely sleep apnea while hospitalized , to follow up as OP for sleep study, has not been done    TIA (transient ischemic attack) 2005    no residual symptoms    Type II or unspecified type diabetes mellitus without mention of complication, not stated as uncontrolled     Wears glasses     Wellness examination     PCP, Dr. Polina Kerr, last seen       Past Surgical History:        Procedure Laterality Date    CARPAL TUNNEL RELEASE Left 11/23/2022    CUBITAL TUNNEL DECOMPRESSION performed by Carmela Callahan DO at [address]      x2    COLONOSCOPY  09/17/2012    ?  polyps per pt    HYSTERECTOMY, TOTAL ABDOMINAL (CERVIX REMOVED)  2008    with BSO    OTHER SURGICAL HISTORY Left 11/23/2022    cubital tunnel release    SHOULDER SURGERY Left 02/17/2021    SHOULDER MANIPULATION WITH PRE OP INTERSCALENE BLOCK and intraop injection performed by Lizzette Navarro DO at [address] Left 10/03/2022    S1 nerve root injection, Dr. Lockhart Scale Left 10/06/2022    S1 NERVE ROOT INJECTION performed by Primitivo Feldman MD at [place] [id number] TONSILLECTOMY      as child    UPPER GASTROINTESTINAL ENDOSCOPY  07/23/2012    [facility]       Social History:   Social History     Socioeconomic History    Marital status:      Spouse name: Not on file    Number of children: Not on file    Years of education: Not on file    Highest education level: Not on file   Occupational History    Not on file   Tobacco Use    Smoking status: Never    Smokeless tobacco: Never   Vaping Use    Vaping Use: Never used   Substance and Sexual Activity    Alcohol use: Yes     Alcohol/week: 0.0 standard drinks     Comment: rarely/ 4 times a year    Drug use: Never    Sexual activity: Yes   Other Topics Concern    Not on file   Social History Narrative    Not on file     Social Determinants of Health     Financial Resource Strain: Low Risk     Difficulty of Paying Living Expenses: Not hard at all   Food Insecurity: No Food Insecurity    Worried About Running Out of Food in the Last Year: Never true    Ran Out of Food in the Last Year: Never true   Transportation Needs: No Transportation Needs    Lack of Transportation (Medical): No    Lack of Transportation (Non-Medical): No   Physical Activity: Unknown    Days of Exercise per Week: 0 days    Minutes of Exercise per Session: Not on file   Stress: Stress Concern Present    Feeling of Stress : To some extent   Social Connections: Socially Integrated    Frequency of Communication with Friends and Family: More than three times a week    Frequency of Social Gatherings with Friends and Family: Once a week    Attends Synagogue Services: More than 4 times per year    Active Member of Clubs or Organizations: Yes    Attends Club or Organization Meetings: More than 4 times per year    Marital Status:    Intimate Partner Violence: Not At Risk    Fear of Current or Ex-Partner: No    Emotionally Abused: No    Physically Abused: No    Sexually Abused: No   Housing Stability: Low Risk     Unable to Pay for Housing in the Last Year: No     Number of Places Lived in the Last Year: 1    Unstable Housing in the Last Year: No       Family History:       Problem Relation Age of Onset    Heart Attack Mother     Diabetes Mother     Diabetes Father     Heart Attack Father        Allergies:  Codeine    Home Medications:  Prior to Admission medications    Medication Sig Start Date End Date Taking? Authorizing Provider   diclofenac sodium (VOLTAREN) 1 % GEL APPLY TWO GRAMS TOPICALLY FOUR TIMES A DAY, MORNING, NOON, EVENING AND BEFORE BEDTIME 1/3/23  Yes Karri Gamboa MD   loratadine-pseudoephedrine (CLARITIN-D 24 HOUR)  MG per extended release tablet Take 1 tablet by mouth daily 12/29/22  Yes Karri Gamboa MD   benzonatate (TESSALON PERLES) 100 MG capsule Take 1 capsule by mouth every 6 hours as needed for Cough 12/29/22 12/29/23 Yes Karri Gamboa MD   busPIRone (BUSPAR) 10 MG tablet Take 1 tablet by mouth 3 times daily Take 1 1/2 tab three times daily 12/29/22 1/28/23 Yes Karri Gamboa MD   sertraline (ZOLOFT) 100 MG tablet Take 1 tablet by mouth daily 12/29/22  Yes Karri Gamboa MD   blood glucose test strips (PRODIGY NO CODING BLOOD GLUC) strip Use to test once daily and as needed as directed by provider. Please dispense Prodigy brand.  12/29/22  Yes Karri Gamboa MD   rOPINIRole (REQUIP) 2 MG tablet Take 2 tablets by mouth nightly 12/29/22  Yes Karri Gamboa MD   furosemide (LASIX) 20 MG tablet Take 1 tablet by mouth daily 12/29/22  Yes Karri Gamboa MD   magnesium oxide (MAG-OX) 400 MG tablet Take 1 tablet by mouth daily 12/20/22  Yes Tri Mcgraw MD   albuterol sulfate HFA (PROVENTIL HFA) 108 (90 Base) MCG/ACT inhaler Inhale 2 puffs into the lungs every 4 hours as needed for Wheezing 12/12/22 12/12/23 Yes Karri Gamboa MD   ondansetron (ZOFRAN) 4 MG tablet Take 1 tablet by mouth daily as needed for Nausea or Vomiting 12/12/22  Yes Karri Gamboa MD   glipiZIDE (GLUCOTROL) 10 MG tablet TAKE 1 TABLET BY MOUTH TWO TIMES A DAY 11/30/22  Yes Tracey Rodriguez MD   omeprazole (PRILOSEC) 20 MG delayed release capsule TAKE 1 CAPSULE BY MOUTH 2 TIMES A DAY 11/30/22  Yes Tracey Rodriguez MD   lisinopril (PRINIVIL;ZESTRIL) 40 MG tablet TAKE 1 TABLET BY MOUTH ONE TIME A DAY 11/30/22  Yes Tracey Rodriguez MD   Prodigy Lancets 28G [address] [facility] 100 Lancet by Does not apply route three times daily 11/30/22  Yes Tracey Rodriguez MD   methocarbamol (ROBAXIN) 750 MG tablet  11/8/22  Yes Historical Provider, MD   ASPIRIN LOW DOSE 81 MG EC tablet TAKE 1 TABLET BY MOUTH ONE TIME A DAY 11/8/22  Yes Barry Olivier MD   celecoxib (CELEBREX) 100 MG capsule Take 100 mg by mouth 2 times daily   Yes Historical Provider, MD   glipiZIDE (GLUCOTROL) 10 MG tablet Take 10 mg by mouth 2 times daily (before meals)   Yes Historical Provider, MD   bimatoprost (LUMIGAN) 0.01 % SOLN ophthalmic drops Place 1 drop into both eyes nightly   Yes Historical Provider, MD   omeprazole (PRILOSEC) 20 MG delayed release capsule Take 20 mg by mouth daily   Yes Historical Provider, MD   Empagliflozin-metFORMIN HCl (SYNJARDY) 5-1000 MG TABS Take 1,000 mg by mouth 2 times daily (before meals)   Yes Historical Provider, MD   Dulaglutide (TRULICITY) 1.5 UR/7.1SQ SOPN Inject 1.5 mg into the skin once a week   Yes Historical Provider, MD   albuterol sulfate HFA (PROVENTIL;VENTOLIN;PROAIR) 108 (90 Base) MCG/ACT inhaler Inhale 2 puffs into the lungs every 6 hours as needed for Wheezing   Yes Historical Provider, MD   docusate sodium (COLACE) 100 MG capsule Take 100 mg by mouth 2 times daily as needed for Constipation   Yes Historical Provider, MD   cyclobenzaprine (FLEXERIL) 10 MG tablet Take 10 mg by mouth 3 times daily as needed for Muscle spasms   Yes Historical Provider, MD   TRULICITY 1.5 FU/9.0LZ SOPN INJECT THE CONTENTS OF ONE SYRINGE UNDER THE SKIN ONCE WEEKLY  Patient taking differently: Inject 1.5 mg into the skin once a week Saturdays 9/21/22  Yes Tracey Rodriguez MD   celecoxib (CELEBREX) 100 MG capsule TAKE 1 CAPSULE BY MOUTH 2 TIMES A DAY 9/8/22  Yes Vandana Villar MD   SYNJARDY 5-1000 MG TABS TAKE 1 TABLET BY MOUTH 2 TIMES A DAY 8/30/22  Yes Karri Gamboa MD   alendronate (FOSAMAX) 70 MG tablet Take 1 tablet by mouth every 7 days  Patient taking differently: Take 70 mg by mouth every 7 days Wednesdays 7/29/22  Yes Senthil Riojas MD   oxyCODONE-acetaminophen (PERCOCET) 5-325 MG per tablet Take by mouth every 4 hours as needed for Pain. Yes Historical Provider, MD   atorvastatin (LIPITOR) 40 MG tablet Take 1 tablet by mouth at bedtime 7/15/22  Yes Junito Jeffrey MD   bimatoprost (LUMIGAN) 0.01 % SOLN ophthalmic drops Place 1 drop into both eyes nightly 4/26/18  Yes Historical Provider, MD   Blood Glucose Monitoring Suppl (PRODIGY AUTOCODE BLOOD GLUCOSE) w/Device KIT 1 Device by Does not apply route 3 times daily 4/26/19  Yes Karri Gamboa MD   gabapentin (NEURONTIN) 400 MG capsule Take 1 capsule by mouth 3 times daily for 30 days. 12/20/22 1/19/23  Mo'Rosanne Chavez MD   Elastic Bandages & Supports (ELBOW BRACE) MISC 1 each by Does not apply route at bedtime Please dispense left cubital tunnel brace 8/18/22 11/16/22  Kiara Crimes, APRN - CNP   repaglinide (PRANDIN) 1 MG tablet TAKE 1 TABLET BY MOUTH 3 TIMES A DAY BEFORE MEALS 12/8/20   Karri Gamboa MD       Current Medications:   No current facility-administered medications for this visit.       PHYSICAL EXAM:       /76   Pulse 74   Temp 97.9 °F (36.6 °C)   Resp 22   Wt 189 lb (85.7 kg)   SpO2 97%   BMI 33.48 kg/m²   Physical Exam     Gen: NAD  HEENT: moist mucus membranes  Cardio: RRR  Pulm: chest rise symmetrically  GI: abd soft  Ext: no edema  Skin: warm    Neuro:    AOX3  CN 2-12 grossly intact  Speech articulate  Motor 5/5  No pronator drift  Sensation symmetrical   Left  4/5, right 5/5  Left Interosseous 4+/5  Deltoid, bicep, tricep 4+/5 on the left  Right intermittent Boyer's sign  Bilateral Trömner sign    Radiology Review:    None new. ASSESSMENT AND PLAN:       Patient Active Problem List   Diagnosis    Cervical spondylosis    Type 2 diabetes mellitus without complication, without long-term current use of insulin (HCC)    Hypertension    Abnormal auditory perception    Nasal polyps    Osteoarthritis of left knee    Osteoarthritis of right knee    DIEGO (nonalcoholic steatohepatitis)    Vitamin D deficiency    Iron deficiency anemia    Dyslipidemia    Trochanteric bursitis    Chronic left shoulder pain    Restless legs syndrome    Generalized anxiety disorder    Toxic metabolic encephalopathy    Class 2 obesity in adult    Leg edema    Recurrent major depressive disorder (HCC)    Dermatitis    Retrolisthesis of vertebrae    Bilateral carpal tunnel syndrome    Intention tremor    Sciatica    Back pain with radiation    Left sciatic nerve pain    Lumbar disc disease with radiculopathy    Intractable back pain    Piriformis syndrome of left side    Ulnar neuropathy at elbow, left    S/P decompression of ulnar nerve at elbow    Cervical myelopathy with cervical radiculopathy (HCC)    Lumbar spondylosis    Right median nerve neuropathy         A/P:  This is a 65 y.o. female with Cervical myelopathy with cervical radiculopathy (HCC)  -     XR CERVICAL SPINE FLEXION AND EXTENSION; Future  S/P decompression of ulnar nerve at elbow  Piriformis syndrome of left side  Lumbar spondylosis  Right median nerve neuropathy     Increased left arm weakness and imaging and physical exam consistent with cervical myelopathy  Ulnar symptoms still persistent but strength is improved      I thoroughly discussed details of diagnosis, natural histories of disease, and treatment options. We discussed surgical and non-surgical options, namely C4-5, C5-6, and C6-7 angio cervical decompression. I detailed the benefits, risks, recovery period, and alternatives of this surgery.  I used the imaging to depict the surgery and diagnosis to the patient. Follow up in 3 months as she would like to think about this more    Patient and/or family was counseled on the diagnosis and treatment plan    By signing my name below, I, Henri Cervantes, attest that this documentation has been prepared under the direction and in the presence of Isidro Cavanaugh DO. Electronically signed: Oseas Christensen, 1/20/23     This note was created using voice recognition software. There may be inaccuracies of transcription  that are inadvertently overlooked prior to the signature. There is any questions about the transcription please contact me.

## 2023-01-26 ENCOUNTER — CLINICAL DOCUMENTATION (OUTPATIENT)
Dept: PHARMACY | Facility: CLINIC | Age: 63
End: 2023-01-26

## 2023-01-26 NOTE — PROGRESS NOTES
Pharmacy Pop Care Documentation:     Cale Mcbride is being removed from the diabetes management program for the following reason(s):  per [facility] HR patient no longer has benefits    [address] Only    Program: [address]:  No  Gap Closed?: Yes   Time Spent (min): 5

## 2023-01-26 NOTE — LETTER
[facility] [address]  [address], [place] [address]  Phone: toll free [phone] option 3        [place]   [address] [facility] [address]           01/26/23     Dear Ag Arteaga,    We regret to inform you that you have been disqualified from The [address] [place] [address] With Diabetes Program because the following requirements were not met by the stated deadline:     [facility] Department your benefits have been termed     You will not receive the copay waiver up to $600 towards your diabetic medications and supplies in 2023. If you qualify once again, you will be eligible to reapply to [facility] Diabetes Management Program the following calendar year. [facility] Team  [phone] Option #3  Email: [email or web address]  Fax Number: [phone]

## 2023-02-08 ENCOUNTER — TELEMEDICINE (OUTPATIENT)
Dept: NEUROLOGY | Age: 63
End: 2023-02-08
Payer: COMMERCIAL

## 2023-02-08 DIAGNOSIS — M54.42 CHRONIC LEFT-SIDED LOW BACK PAIN WITH LEFT-SIDED SCIATICA: Primary | ICD-10-CM

## 2023-02-08 DIAGNOSIS — R20.0 NUMBNESS OF UPPER EXTREMITY: ICD-10-CM

## 2023-02-08 DIAGNOSIS — G25.81 RESTLESS LEGS SYNDROME: ICD-10-CM

## 2023-02-08 DIAGNOSIS — R41.89 BRAIN FOG: ICD-10-CM

## 2023-02-08 DIAGNOSIS — G89.29 CHRONIC LEFT-SIDED LOW BACK PAIN WITH LEFT-SIDED SCIATICA: Primary | ICD-10-CM

## 2023-02-08 DIAGNOSIS — R25.1 TREMOR: ICD-10-CM

## 2023-02-08 PROCEDURE — 99214 OFFICE O/P EST MOD 30 MIN: CPT | Performed by: STUDENT IN AN ORGANIZED HEALTH CARE EDUCATION/TRAINING PROGRAM

## 2023-02-08 RX ORDER — GABAPENTIN 300 MG/1
600 CAPSULE ORAL 3 TIMES DAILY
Qty: 180 CAPSULE | Refills: 0 | Status: SHIPPED | OUTPATIENT
Start: 2023-02-08 | End: 2023-03-10

## 2023-02-08 NOTE — PROGRESS NOTES
[address] [place] [address]  [address] [id number]  Dept: [phone]  Dept Fax: [phone]    NEUROLOGY CLINIC NOTE       PATIENT NAME: Michelle Mathews  PATIENT MRN: [id number]  PRIMARY CARE PHYSICIAN: Tonya Jose MD    HPI:      This was a virtual visit. Low back pain and left hip pain secondary to left piriformis syndrome. Michelle Mathews is a 65years old female patient. She follows up with neurology as a case of left hip and low back pain for at least a year, was referred by family medicine in 4/6/22 which was her first visit to our neurology service. She had a hospital encounter in October 16 2022; she presented with constant left buttock pain for pain control and evaluation. She had MRI pelvis WWO; slight asymmetric atrophy of the left-sided piriformis muscle which can be seen with left-sided piriformis syndrome. Mild to moderate nonspecific edema in the right gluteus julissa muscle with no significant muscle atrophy. Mild nonspecific edema in the posterior left obturator internus muscle and mild nonspecific edema in the subcutaneous fat at the lateral aspect of the right hip/thigh; she was treated with steroids epidural injection IR guided in addition to percocet and analgesics. She has left cubital tunnel syndrome with surgery done in 11/23/2022 with Dr. Beverley Diaz and Dr. Ren Benson has done 3 injection in the piriformis area (pain management). She still has pain and is using a cane for ambulation. She ran out of her insurance and was not able to follow with pain management but she said she is going to see another specialist at [facility]. Bilateral wrist tremors, R>L, case of essential tremors. This was noticed around 1 year ago, started in right hand; progressively worse now left hand in involved. It occurs at rest, no family hx, worse in the evening, worse with action. Scared to carry a cup of coffee. no relation with anxiety.  Patient doesn't drink alcohol. This issue is still ongoing. 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 Radiculopathy of C6-C7 and right C5-C6. Brain Fog   she complained Word finding difficulty and memory issues and brain fog sensation ever being diagnosed with covid December 2021. Short term memory loss. Most of the time. Since of COVID time. Forgets where she places things, not driving because of weakness in the arms and legs. Not paying attention. No hallucinations. Cannot sleep because of RLS and because of back pain and neck pain. RLS syndrome  This was diagnosed many years ago and recently has complained that it is worsening and the patient cannot sleep anymore because of this. She is on gabapentin 400 mg tid orally. Bilateral upper Ext numbness  Left arm weak and numb (ring and pinky finger) and numbness outside of the arm with right pinky fingers with functional impairment as she reported not being able to wash the dishes and other ADLs. Since June 2022. After the left cubital tunnel syndrome surgery, she felt slight improvement of numbness and strenght. She has chr. Neck pain and [facility] was recommending possible surgery with fixation for the cervical myelopathy and radiculopathy     Interval history:     65 female patient. She was first seen and evaluated in our neurology service in 4/6/22; she was tearing in pain with low back, left hip and leg pain. Initial onset of pain was 1 year ago but has been acutely worsening over the past week prior to this visit. Pain is was 9 out of 10 and is affecting ADLs; affecting patient's ability to walk or sleep. Had Toradol injection at the family medicine office which partially helped with her pain. She now describes a low back pain that is more on the left side radiating down to the posterior aspect of the thigh down to the knee then travels laterally over the tibia down to the big toe associated with numbness. Constant. Partial relief with leaning forward and Toradol . Sharp and stabbing in nature, increased by lying on the left side. Now she's using a cane for ambulation. She tried over-the-counter medications and steroids in the past with no relief. Mild improvement seen in past with aquatic therapy and Flexeril. She was started on gabapentin by orthopedics one week before the neurology office visit; 300 mg 3 times daily with no improvement seen as of yet back at that time. She had epidural lumbar injection back in November 2021 and worked with physical therapy but reported partial relief of her symptoms    No red flags; no weight loss, no fever, no loss of appetite, no bladder or bowel incontinence    Recent imaging of the MRI C/T/L spine reviewed and were remarkable for mild degenerative changes with spinal stenosis of L3-L4, ligamentum flavum hypertrophy with face joint hypertrophy and paraspinal multifidus muscle atrophy. The patient had a follow up MRI L spine in 6/5/22 which showed no significant change. There was mentioning disc bulging at L5-S1 1 with asymmetric engorgement on the left lateral recess and abutment of the transversing the left S1 nerve root. MRI C-spine and T-spine showed multilevel degenerative change with moderate spinal canal narrowing at C5-6 and mild spinal canal narrowing at C4-5 and C6-7 to level neuroforaminal narrowing. Also degenerative change with mild spinal canal narrowing at T8-9 and T9-10 with bilateral neuroforaminal narrowing at T8-9. After the last visit 4/6/22, the patient had some relief in her back pain. However in May 31st/2022 it appears she had an exacerbation of her low back pain following just simply folding some clothes. She went to [facility] and was hospitalized for severe low back pain that was treated with opioids. She was hospitalized for 10 days after she had hypotension and unresponsivenes and was transferred to the ICU.  She was discharged awake and alert and oriented but her back pain persisted. She is to start physical therapy next week. she reported bilateral left ulnar side of each wist numbness that extends all the way back to the elbow. She mentioned she was lying on her abdomen in the ER for prolonged time on the examination bed. Low back pain still there, constant all day. She was discharged on some percocet with gabapentin that was given as 300 mg in the am and 600 mg in the pm. No new red flags, no incontiencce or fever.        PREVIOUS WORKUP:     Lab Results   Component Value Date    WBC 11.3 12/15/2022    HGB 12.1 12/15/2022    HCT 40.5 12/15/2022    MCV 94.2 12/15/2022     12/15/2022       Past Medical History:   Diagnosis Date    Cervical spondylosis 04/2009    c-4 c-5, c-5 c-6, c-6 c-7    CKD (chronic kidney disease)     per pt, does not have nephrologist    COVID-19 12/06/2021    nasal congestion, fatique and myalgia x 3 weeks    Fall     PATIENT FELL 2 WEEKS AGO LANDED ON [employer]    Generalized anxiety disorder 12/08/2020    GERD (gastroesophageal reflux disease)     History of recent hospitalization 09/30/2022    til 10/9/22 at [facility]. V's    History of swelling of feet     lasix QOD for this    Hyperlipidemia     Hypertension     Left hand weakness     and pain, can not make fist d/t cubital tunnel issue    Lumbar radiculopathy 11/10/2021    was following with pain management and has had epidural injections    DIEGO (nonalcoholic steatohepatitis) 10/12/2014    Obesity     Osteoarthritis of left knee 08/19/2014    Recurrent major depressive disorder ([facility] [address] 07/15/2022    Restless legs syndrome     Snores 06/06/2022    was seen by Dr. Emeterio Webb for likely sleep apnea while hospitalized , to follow up as OP for sleep study, has not been done    TIA (transient ischemic attack) 2005    no residual symptoms    Type II or unspecified type diabetes mellitus without mention of complication, not stated as uncontrolled     Wears glasses     Wellness examination PCP, Dr. Khurram Jorge, last seen        Past Surgical History:   Procedure Laterality Date    CARPAL TUNNEL RELEASE Left 11/23/2022    CUBITAL TUNNEL DECOMPRESSION performed by Junior Mckeon DO at [address]       x2    COLONOSCOPY  09/17/2012    ? polyps per pt    HYSTERECTOMY, TOTAL ABDOMINAL (CERVIX REMOVED)  2008    with BSO    OTHER SURGICAL HISTORY Left 11/23/2022    cubital tunnel release    SHOULDER SURGERY Left 02/17/2021    SHOULDER MANIPULATION WITH PRE OP INTERSCALENE BLOCK and intraop injection performed by Tona Meier DO at [address] Left 10/03/2022    S1 nerve root injection, Dr. Charles Favors Left 10/06/2022    S1 NERVE ROOT INJECTION performed by Tashi Brady MD at [address] [facility]      as child    UPPER GASTROINTESTINAL ENDOSCOPY  07/23/2012    [facility]        Social History     Socioeconomic History    Marital status:      Spouse name: Not on file    Number of children: Not on file    Years of education: Not on file    Highest education level: Not on file   Occupational History    Not on file   Tobacco Use    Smoking status: Never    Smokeless tobacco: Never   Vaping Use    Vaping Use: Never used   Substance and Sexual Activity    Alcohol use: Yes     Alcohol/week: 0.0 standard drinks     Comment: rarely/ 4 times a year    Drug use: Never    Sexual activity: Yes   Other Topics Concern    Not on file   Social History Narrative    Not on file     Social Determinants of Health     Financial Resource Strain: Low Risk     Difficulty of Paying Living Expenses: Not hard at all   Food Insecurity: No Food Insecurity    Worried About [address] in the Last Year: Never true    [facility] in the Last Year: Never true   Transportation Needs: No Transportation Needs    Lack of Transportation (Medical): No    Lack of Transportation (Non-Medical):  No   Physical Activity: Unknown    Days of Exercise per Week: 0 days    Minutes of Exercise per Session: Not on file   Stress: Stress Concern Present    Feeling of Stress : To some extent   Social Connections: Socially Integrated    Frequency of Communication with Friends and Family: More than three times a week    Frequency of Social Gatherings with Friends and Family: Once a week    Attends [employer] Services: More than 4 times per year    Active Member of [employer] Group or Organizations: Yes    Attends Club or Organization Meetings: More than 4 times per year    Marital Status:    Intimate Partner Violence: Not At Risk    Fear of Current or Ex-Partner: No    Emotionally Abused: No    Physically Abused: No    Sexually Abused: No   Housing Stability: Low Risk     Unable to Pay for Housing in the Last Year: No    Number of [place] in the Last Year: 1    Unstable Housing in the Last Year: No        Current Outpatient Medications   Medication Sig Dispense Refill    diclofenac sodium (VOLTAREN) 1 % GEL APPLY TWO GRAMS TOPICALLY FOUR TIMES A DAY, MORNING, NOON, EVENING AND BEFORE BEDTIME 150 g 2    loratadine-pseudoephedrine (CLARITIN-D 24 HOUR)  MG per extended release tablet Take 1 tablet by mouth daily 14 tablet 3    benzonatate (TESSALON PERLES) 100 MG capsule Take 1 capsule by mouth every 6 hours as needed for Cough 28 capsule 1    busPIRone (BUSPAR) 10 MG tablet Take 1 tablet by mouth 3 times daily Take 1 1/2 tab three times daily 90 tablet 3    sertraline (ZOLOFT) 100 MG tablet Take 1 tablet by mouth daily 90 tablet 3    blood glucose test strips (PRODIGY NO CODING BLOOD GLUC) strip Use to test once daily and as needed as directed by provider. Please dispense Prodigy brand. 100 each 5    rOPINIRole (REQUIP) 2 MG tablet Take 2 tablets by mouth nightly 90 tablet 3    furosemide (LASIX) 20 MG tablet Take 1 tablet by mouth daily 90 tablet 3    gabapentin (NEURONTIN) 400 MG capsule Take 1 capsule by mouth 3 times daily for 30 days.  90 capsule 0    magnesium oxide (MAG-OX) 400 MG tablet Take 1 tablet by mouth daily 30 tablet 3    albuterol sulfate HFA (PROVENTIL HFA) 108 (90 Base) MCG/ACT inhaler Inhale 2 puffs into the lungs every 4 hours as needed for Wheezing 2 each 5    ondansetron (ZOFRAN) 4 MG tablet Take 1 tablet by mouth daily as needed for Nausea or Vomiting 30 tablet 3    glipiZIDE (GLUCOTROL) 10 MG tablet TAKE 1 TABLET BY MOUTH TWO TIMES A  tablet 0    omeprazole (PRILOSEC) 20 MG delayed release capsule TAKE 1 CAPSULE BY MOUTH 2 TIMES A  capsule 2    lisinopril (PRINIVIL;ZESTRIL) 40 MG tablet TAKE 1 TABLET BY MOUTH ONE TIME A DAY 30 tablet 2    Prodigy Lancets 28G MISC 100 Lancet by Does not apply route three times daily 100 each 3    methocarbamol (ROBAXIN) 750 MG tablet       ASPIRIN LOW DOSE 81 MG EC tablet TAKE 1 TABLET BY MOUTH ONE TIME A DAY 30 tablet 2    celecoxib (CELEBREX) 100 MG capsule Take 100 mg by mouth 2 times daily      glipiZIDE (GLUCOTROL) 10 MG tablet Take 10 mg by mouth 2 times daily (before meals)      bimatoprost (LUMIGAN) 0.01 % SOLN ophthalmic drops Place 1 drop into both eyes nightly      omeprazole (PRILOSEC) 20 MG delayed release capsule Take 20 mg by mouth daily      Empagliflozin-metFORMIN HCl (SYNJARDY) 5-1000 MG TABS Take 1,000 mg by mouth 2 times daily (before meals)      Dulaglutide (TRULICITY) 1.5 PK/4.0XP SOPN Inject 1.5 mg into the skin once a week      albuterol sulfate HFA (PROVENTIL;VENTOLIN;PROAIR) 108 (90 Base) MCG/ACT inhaler Inhale 2 puffs into the lungs every 6 hours as needed for Wheezing      docusate sodium (COLACE) 100 MG capsule Take 100 mg by mouth 2 times daily as needed for Constipation      cyclobenzaprine (FLEXERIL) 10 MG tablet Take 10 mg by mouth 3 times daily as needed for Muscle spasms      TRULICITY 1.5 HL/6.2XA SOPN INJECT THE CONTENTS OF ONE SYRINGE UNDER THE SKIN ONCE WEEKLY (Patient taking differently: Inject 1.5 mg into the skin once a week Saturdays) 6 mL 2    celecoxib (CELEBREX) 100 MG capsule TAKE 1 CAPSULE BY MOUTH 2 TIMES A  capsule 1    SYNJARDY 5-1000 MG TABS TAKE 1 TABLET BY MOUTH 2 TIMES A  tablet 2    Elastic Bandages & Supports (ELBOW BRACE) MISC 1 each by Does not apply route at bedtime Please dispense left cubital tunnel brace 1 each 0    alendronate (FOSAMAX) 70 MG tablet Take 1 tablet by mouth every 7 days (Patient taking differently: Take 70 mg by mouth every 7 days Wednesdays) 12 tablet 1    oxyCODONE-acetaminophen (PERCOCET) 5-325 MG per tablet Take by mouth every 4 hours as needed for Pain. atorvastatin (LIPITOR) 40 MG tablet Take 1 tablet by mouth at bedtime 90 tablet 2    bimatoprost (LUMIGAN) 0.01 % SOLN ophthalmic drops Place 1 drop into both eyes nightly      Blood Glucose Monitoring Suppl (PRODIGY AUTOCODE BLOOD GLUCOSE) w/Device KIT 1 Device by Does not apply route 3 times daily 1 kit 0     No current facility-administered medications for this visit. Allergies   Allergen Reactions    Codeine Nausea Only        REVIEW OF SYSTEMS:     Review of Systems     Review of Systems    Constitutional: Negative for appetite change, chills, fever and unexpected weight change. Eyes: Negative for photophobia and pain. Negative for visual disturbance. Respiratory: Negative for cough and shortness of breath. Cardiovascular: Negative  for chest pain. Negative for palpitations. Gastrointestinal: Negative for abdominal pain, constipation, diarrhea, nausea and vomiting. Endocrine: Negative for polydipsia and polyuria. Genitourinary: Negative for difficulty urinating, dysuria and hematuria. Musculoskeletal: Negative for back pain and neck pain. Skin: Negative for pallor and rash. Neurological: Negative for facial asymmetry, weakness, numbness and headaches. Negative for dizziness, tremors, seizures, syncope, speech difficulty and light-headedness. Psychiatric/Behavioral: Negative for behavioral problems and hallucinations.      VITALS  There were no vitals taken for this visit. PHYSICAL EXAMINATION:     Physical Exam   General appearance: cooperative  Skin: no rash or skin lesions. HEENT: normocephalic  Optic Fundi: deferred  Neck: supple: no cervcical adenopathy or carotid bruit  Lungs: clear to auscultation  Heart: Regular rate and rhythm, normal S1, S2. No murmurs, clicks or gallops. Peripheral pulses: radial pulses palpable  Abdominal: BS present, soft, NT, ND  Extremities: no edema    NEUROLOGICAL EXAMINATION:     GENERAL  Appears comfortable and in no distress   HEENT  NC/ AT   HEART  S1 and S2 heard; palpation of pulses: radial pulse    NECK  Supple and no bruits heard   MENTAL STATUS:  Alert, oriented, intact memory, no confusion, normal speech, normal language, no hallucination or delusion   CRANIAL NERVES: II     -      Visual fields intact to confrontation  III,IV,VI -  PERR, EOMs full, no ptosis  V     -     Normal facial sensation   VII    -     Normal facial symmetry  VIII   -     Intact hearing   IX,X -     Symmetrical palate  XI    -     Symmetrical shoulder shrug  XII   -     Midline tongue, no atrophy    MOTOR FUNCTION: RUE: Significant for good strength of grade 5/5 in proximal and 4/5 in distal muscles   LUE: Significant for good strength of grade 5/5 in proximal and 4/5 in distal muscles   RLE: Significant for good strength of grade 5/5 in proximal and distal muscle groups   LLE: Significant for good strength of grade 5/5 in proximal and distal muscle groups   -Cannot determine and evaluate bilateral hand strength. Normal bulk, normal tone and no involuntary movements, no tremor   SENSORY FUNCTION:  reduced sensation over the left radial nerve distrubution   CEREBELLAR FUNCTION:  Intact fine motor control over upper limbs and lower limbs   REFLEX FUNCTION: Symmetric in upper and lower extremities, no Babinski sign   STATION and GAIT Using a cane for ambulation.       Positive paraspinal tenderness on left side    IMAGING: Imaging/Diagnostics:  ECHO Complete 2D W Doppler W Color    Result Date: 6/6/2022  [facility] Transthoracic Echocardiography Report (TTE)  Patient Name Lauren Henry Date of Study                 06/06/2022               LEE ANN CARRASQUILLO   Date of      1960  Gender                        Female  Birth   Age          65 year(s)  Race                             Room Number  2011   Corporate ID [id number]    Weight:                       216 pounds, 98 kg  #   Patient Myah Santos [de-identified]  #   MR #         [id number]      Sonographer                   Marisol Jorge   Accession #  [id number]  Interpreting Physician        Tawny Zelaya   Fellow                   Referring Nurse Practitioner   Interpreting             Referring Physician           Venu Lorenzo  Fellow                                                 Anastacia Huitron  Type of Study   TTE procedure:2D Echocardiogram, M-Mode, Doppler, Color Doppler. Procedure Date Date: 06/06/2022 Start: 02:10 PM Study Location: [address] [place] [address] Technical Quality: Fair visualization Indications:Congestive heart failure. Patient Status: Inpatient Weight: 216 pounds Rhythm: Sinus tachycardia HR: 111 bpm BP: 111/61 mmHg CONCLUSIONS Summary Technically difficult study. Normal LV size. Hyperdynamic left ventricular function. Estimated LV EF 65-70 %. Normal wall thickness. No segmental wall motion abnormalities. Grade I (mild) left ventricular diastolic dysfunction. Normal right ventricular size and function. Left atrium is normal in size. Right atrium was not well visualized. Aortic valve was not well visualized. No aortic stenosis. No aortic insufficiency. Normal aortic root dimension. Normal mitral valve structure and function. No significant mitral regurgitation. Tricuspid valve was not well visualized. Insignificant tricuspid regurgitation, unable to estimate RVSP. IVC not well visualized. No significant pericardial effusion is seen.  Signature ----------------------------------------------------------------------------  Electronically signed by Tawny Zelaya(Interpreting physician) on  2022 05:07 PM ---------------------------------------------------------------------------- ----------------------------------------------------------------------------  Electronically signed by Marisol Jorge(Sonographer) on 2022 02:37  PM ---------------------------------------------------------------------------- FINDINGS Left Atrium Left atrium is normal in size. Left Ventricle Small LV cavity. Hyperdynamic left ventricular function. Estimated LV EF 65-70 %. Normal wall thickness. No segmental wall motion abnormalities. Grade I (mild) left ventricular diastolic dysfunction. Right Atrium Right atrium was not well visualized. Right Ventricle Normal right ventricular size and function. Mitral Valve Normal mitral valve structure and function. No significant mitral regurgitation. Aortic Valve Aortic valve was not well visualized. No aortic insufficiency. No aortic stenosis. Tricuspid Valve Tricuspid valve was not well visualized. Insignificant tricuspid regurgitation, unable to estimate RVSP. Pulmonic Valve Pulmonic valve not well visualized but Doppler velocities are normal. No pulmonic insufficiency. Pericardial Effusion No significant pericardial effusion is seen. Miscellaneous Normal aortic root dimension. E/e' average 7.4 IVC not well visualized.  M-mode / 2D Measurements & Calculations:   LVIDd:3.19 cm(3.7 - 5.6 cm)      Diastolic ELQTFQ:98.1 ml  KIEQB:5.86 cm(2.2 - 4.0 cm)      Systolic QPUHHO:5.34 ml  TELM:2.23 cm(0.6 - 1.1 cm)       Aortic Root:2.4 cm(2.0 - 3.7 cm)  LVPWd:1.07 cm(0.6 - 1.1 cm)      LA Dimension: 2.9 cm(1.9 - 4.0 cm)  Fractional Shortenin.81 %    LA volume/Index: 17.7 ml  Calculated LVEF (%): 78.22 %     LVOT:1.9 cm   Mitral:                              Aortic   Peak E-Wave: 0.61 m/s                Peak Velocity: 1.69 m/s  Peak A-Wave: 0.86 m/s                Mean Velocity: 1.22 m/s  E/A Ratio: 0.71                      Peak Gradient: 11.42 mmHg  Peak Gradient: 1.47 mmHg             Mean Gradient: 7 mmHg  Deceleration Time: 162 msec                                        Area (continuity): 2.44 cm^2                                       AV VTI: 27.1 cm  Septal Wall E' velocity:0.08 m/s Lateral Wall E' velocity:0.08 m/s    US RETROPERITONEAL COMPLETE    Result Date: 6/7/2022  EXAMINATION: RETROPERITONEAL ULTRASOUND OF THE KIDNEYS AND URINARY BLADDER 6/7/2022 COMPARISON: None HISTORY: ORDERING SYSTEM PROVIDED HISTORY: Acute kidney injury TECHNOLOGIST PROVIDED HISTORY: Acute kidney injury FINDINGS: Kidneys: The right kidney measures 10.6 cm in length and the left kidney measures 0.7 cm in length. Kidneys demonstrate normal cortical echogenicity. No evidence of hydronephrosis or intrarenal stones. Incidental finding of increased echogenicity through the liver consistent with hepatic steatosis. Unremarkable ultrasound kidneys Liver shows increased echogenicity suggesting hepatic steatosis without focal lesion. RECOMMENDATIONS: Unavailable     ASSESSMENT:     65years old female patient. Follows with neurology as a case of low back pain. This is a follow up visit today to the office    The following are her neurological issues:    1-Low back pain and left hip pain secondary to left piriformis syndrome. 2-Bilateral wrist tremors, R>L, case of essential tremors. 3-Brain Fog 2ry to COVID   4-RLS syndrome  5-Bilateral upper Ext numbness, cervical radiculopathy     PLAN:     1-Low back pain and left hip pain secondary to left piriformis syndrome. - continue following with pain management   - PT orders in   - Increased gabapentin to 600 mg TID     2-Bilateral wrist tremors, R>L, case of essential tremors.   - Increased gabapentin 600 mg TID     3-Brain Fog 2ry to COVID   - Brain MRI to R/O intracranial pathologies  - Recommended supplements; [place], Turmeric, Black seed oil      4-RLS syndrome  - Increased gabapentin to 600 mg TID     5-Bilateral upper Ext numbness, cervical radiculopathy   - MRI C spine   - Follow with Neurosurgery    RTC 3-4 months      Ms. De Santiago received counseling on the following healthy behaviors: medical compliance, smoking cessation, blood pressure control, regular follow up with primary doctor.         Electronically signed by Ceferino Isidro MD on 2/8/2023 at 1:29 PM

## 2023-02-15 ENCOUNTER — CLINICAL DOCUMENTATION (OUTPATIENT)
Dept: PHYSICAL THERAPY | Age: 63
End: 2023-02-15

## 2023-02-15 NOTE — DISCHARGE SUMMARY
[x] [facility] ([facility]) @ [facility]  [address]  [place], [address] [address]  Phone [phone]  Fax [phone]    Physical Therapy Discharge Note    Date: 2/15/2023      Patient: Bravo Gruber  : 1960  MRN: [id number]    Physician: Chapo Rose MD (resident physician)  Aurora Roy MD (attending physician)         Diagnosis: M54.16 (ICD-10-CM) - Lumbar radiculopathy      Onset Date: 2021 with recent exacerbation 2022    Rehab Diagnosis: Recent exacerbation of chronic L sided lumbar radiculopathy  ICD-10 Codes: M54.16, M25.60, R53.1  Total visits attended: 34/43  Cancels/No shows: 0/0  Date of initial visit: 22                   [] Patient recovered from conditions. Treatment goals were met. [] Patient received maximum benefit. No further therapy indicated at this time. [] Patient demonstrated improvement from condition with  ** Of  ** Short term goals met. []Patient demonstrated improvement from condition with **   Of **  Long term goals met. [] Patient to continue exercise/home instructions independently. [] Therapy interrupted due to:    [] Patient has 2 or more no shows/cancels, is discontinued per our policy. [] Patient has completed prescribed number of treatment sessions. [x] Other: Last attended visit 22 with therapy stopped at that time due to surgery planned the following week. Insurance authorization has since       Pain level at evaluation was      8 /10 and at discharge was       unknown/10    It Is My Understanding That The:  [] Patient returned to work. [x] Patient demonstrated improved level of function. [] Patient returned to previous functional level.   [] Patient's current functional status is unknown due to no-shows  [] Other:     Recommendations/Comments:       Treatment Included:     [x] Therapeutic Exercise   65797  [] Iontophoresis: 4 mg/mL Dexamethasone Sodium Phosphate  mAmin  25841   [] Therapeutic Activity  86532 [] Vasopneumatic cold with compression  10018    [] Gait Training   49783 [] Ultrasound   36544   [] Neuromuscular Re-education  41881 [] Electrical Stimulation Unattended  18503   [] Manual Therapy  00946 [] Electrical Stimulation Attended  97830   [x] Instruction in HEP  [] Lumbar/Cervical Traction  64637   [x] Aquatic Therapy   01503 [] Cold/hotpack    [] Massage   88731      [] Dry Needling, 1 or 2 muscles  97508   [] Biofeedback, first 15 minutes   11035  [] Biofeedback, additional 15 minutes   98407 [] Dry Needling, 3 or more muscles  86032                If you have any questions or concerns regarding this patient's care, please contact us.   Thank you for your referral.      Electronically signed by: Charissa Edwards PT

## 2023-02-28 ENCOUNTER — HOSPITAL ENCOUNTER (OUTPATIENT)
Facility: CLINIC | Age: 63
Discharge: HOME | End: 2023-03-02
Payer: COMMERCIAL

## 2023-02-28 ENCOUNTER — HOSPITAL ENCOUNTER (OUTPATIENT)
Dept: MRI IMAGING | Facility: CLINIC | Age: 63
Discharge: HOME | End: 2023-03-02
Payer: COMMERCIAL

## 2023-02-28 ENCOUNTER — HOSPITAL ENCOUNTER (OUTPATIENT)
Dept: GENERAL RADIOLOGY | Facility: CLINIC | Age: 63
Discharge: HOME | End: 2023-03-02
Payer: COMMERCIAL

## 2023-02-28 DIAGNOSIS — R41.89 BRAIN FOG: ICD-10-CM

## 2023-02-28 DIAGNOSIS — G95.9 CERVICAL MYELOPATHY WITH CERVICAL RADICULOPATHY (HCC): ICD-10-CM

## 2023-02-28 DIAGNOSIS — M54.12 CERVICAL MYELOPATHY WITH CERVICAL RADICULOPATHY (HCC): ICD-10-CM

## 2023-02-28 DIAGNOSIS — R20.0 NUMBNESS OF UPPER EXTREMITY: ICD-10-CM

## 2023-02-28 PROCEDURE — 72040 X-RAY EXAM NECK SPINE 2-3 VW: CPT

## 2023-02-28 PROCEDURE — 72141 MRI NECK SPINE W/O DYE: CPT

## 2023-02-28 PROCEDURE — 70551 MRI BRAIN STEM W/O DYE: CPT

## 2023-03-07 NOTE — THERAPY EVALUATION
[address]   Outpatient Physical Therapy  Physical Therapy Lumbar Evaluation    Date:  3/8/2023  Patient: Jackie Bravo  : 1960  MRN: [id number]  Physician: Jaleel Fallon MD (resident physician)  Marthena Castleman, MD (attending physician)    Insurance: [address] [place] [address] Medicaid. Auth required after 30 visits. Medical Diagnosis: M54.42, G89.29 (ICD-10-CM) - Chronic left-sided low back pain with left-sided sciatica    Rehab Codes: M54.42, M62.81, R26.9, R29.6  Onset Date: 2021 with multiple recent exacerbations    Next 's appt: 23 with neurosurgery. 5/10/23 with neurology    Subjective:   CC: Chronic L low back pain with radiating sx into L LE, generalized weakness in B UEs and LEs  HPI: Pt c/o chronic hx of L sided lumbar radiculopathy (radiating into L posterior hip, thigh and lower leg) which started insidiously in 2021. Pt was seen at this facility for 14 PT visits from August to 2021 with very good results and near complete resolution of peripheral LE sx, discharged to an independent aquatic HEP at that time. Shortly after completing PT pt also had a LESI at L5-S1 with good relief and sx were stable for a while. However, pt reports a recent exacerbation of similar sx in the beginning of 2022, again with no mechanism of injury or contributing factors. States that sx this time around seemed to be even more severe with very high levels of pain into her L ankle which has made it difficult for her to walk or mobilize in any capacity. Updated lumbar MRI was mostly unremarkable aside from mild DJD at L3-L4 and pt was referred back to pain mgmt and neurology for continued mgmt.  Pt has received various types of injection therapy, chiropractic treatment and also returned to this facility for continued aquatic therapy April-November but therapy through that episode of care was somewhat interrupted secondary to a hospital admission, pt undergoing a L cubital tunnel surgery, and then pt losing her insurance benefits due to job loss. Since that time, pt has secured new insurance benefits and has now been referred back to PT for continued management. Continues to follow with neurosurgery, neurology and pain mgmt. Pt reports that she did get temporary relief from cortisone injections to her L piriformis region (approximately 2 weeks relief) but that this has been short-lived. Expresses concern with significant weakness (L>R, but reports global weakness since all her health concerns in the last couple years). Continues to report near constant radiating pain into L posterolateral LE and numbness in L lateral calf/foot. Expresses frustration that her hand weakness and numbness has not improved at all after her cubital tunnel surgery and is following up with neurosurgery next month as she fears that she may need future surgery on her neck. Feels very weak/unsteady on her feet and uses a SPC in the community for balance as she has had 1 fall in the past year. Hoping to return to aquatic therapy as she feels like she has regressed functionally since her last episode of care here. PMHx: [] Unremarkable [x] Diabetes [x] HTN  [] Pacemaker   [] MI/Heart Problems [] Cancer [x] Arthritis [x] Other: HLD, L cubital tunnel surgery November 2022              [x] Refer to full medical chart  In EPIC     Comorbidities:   [x] Obesity [] Dialysis  [] Other:   [] Asthma/COPD [] Dementia [] Other:   [] Stroke [] Sleep apnea [] Other:   [] Vascular disease [] Rheumatic disease [] Other:     Preferred Language:   [x] English           [] Other:    Prior Imaging: Recent lumbar MRI from 3/15/22 showed mild degenerative changes at L3-L4 with mild spinal canal stenosis and multifidus atrophy. No significant neural foraminal stenosis. Prior EMG limited/unremarkable.  Pelvic MRI from 10/16/22 showed slight asymmetric atrophy of L piriformis with non-specific edema in L obturator internus and R glute max. Cervical MRI from 2/8/23 showed multilevel degenerative changes with central stenosis most significant at C4-C6. Brain MRI from 2/8/23 showed chronic microvascular disease without intracranial abnormalities. Previous Treatment: Attended 14 PT visits (primarily aquatics) at this facility from August-November 2021 with good relief and then again April-November 2022 with some improvement but this was interrupted due to a hospital admission and then pt losing her insurance benefits. 1 prior LESI at L5-S1. Multiple prior Toradol injections and 3 cortisone injections in L piriformis. Prior chiropractic treatment last year. Medications: [] Refer to full medical record [] None [x] Other: Gabapentin TID, extra strength Tylenol  Allergies:      [x] Refer to full medical record  [] None [] Other:    Work Status: Unemployed    Prior Level of Function: At time of discharge from this facility in November 2021, pt was having minimal pain with just mild residual soreness at L posterior hip. Was able to work full-time and ambulate without a SPC without significant limitations. However, pt has been declining functionally since that time with multiple hospital admissions due to intractable pain. Since last year, pt has been having to use a walker or cane for amb support. Pain:  [x] Yes  [] No Location: L low back to L posterolateral LE  Pain Rating: (0-10 scale) 5/10  Pain altered Tx:  [] Yes  [x] No  Action:    Symptoms:  [] Improving [] Worsening [x] Same  Aggravating factors: Extended sitting, forward bending, movement of L LE  Alleviating factors: Heat, shifting away from L side in sitting, laying on back       Functional Status Previous level of function Current level of function Comments   Sitting [x] Independent  [] Deficit [] Independent  [x] Deficit Pain   Standing/walking [x] Independent  [] Deficit [] Independent  [x] Deficit High levels of pain weight bearing through L LE.  Currently using SPC Driving [x] Independent  [] Deficit [] Independent  [x] Deficit Relies on family for transportation   Housekeeping/Meal Preparation [x] Independent  [] Deficit [] Independent  [x] Deficit    Lifting/Carrying [x] Independent  [] Deficit [] Independent  [x] Deficit Unable   Bending/Reaching [x] Independent  [] Deficit [] Independent  [x] Deficit High levels of pain into L low back and L LE   Stair climbing [x] Independent  [] Deficit [] Independent  [x] Deficit Painful   Pivoting [x] Independent  [] Deficit [] Independent  [x] Deficit High pain levels   Squatting [x] Independent  [] Deficit [] Independent  [x] Deficit Unable   Other [] Independent  [] Deficit [] Independent  [] Deficit      Patient Goals/Rehab Expectations: To improve strength and stability, decrease pain       Objective:      OBSERVATION No Deficit Deficit Not Tested Comments   Forward Head [] [x] []    Rounded Shoulders [] [x] []    Kyphosis [] [x] []    Lordosis [] [x] [] Reduced    Scoliosis [] [] [x]    Innominate Alignment [] [] [x]    NEUROLOGICAL       Reflexes [] [] [x]    Compression/Distraction [] [x] [] Pain with lumbar/SIJ compression  Relief with long axis traction   Sensation [] [x] [] Constant numbness in L lateral calf and foot   FALL RISK? [x] []     Comments:                                  Trista Rogers Fall Risk Assessment    Risk Factor Scale  Score   History of Falls [x] Yes (1 fall in the past year)  [] No 25  0 25   Secondary Diagnosis [] Yes  [x] No 15  0 0   Ambulatory Aid [] Furniture  [x] Crutches/cane/walker  [] None/bedrest/wheelchair/nurse 30  15  0 15   IV/Heparin Lock [] Yes  [x] No 20  0 0   Gait/Transferring [] Impaired  [x] Weak  [] Normal/bedrest/immobile 20  10  0 10   Mental Status [] Forgets limitations  [x] Oriented to own ability 15  0 0      Total: 50     Based on the Assessment score: check the appropriate box.     []  No intervention needed   Low =   Score of 0-24    []  Use standard prevention interventions Moderate =  Score of 24-44   [] Give patient handout and discuss fall prevention strategies   [] Establish goal of education for patient/family RE: fall prevention strategies    [x]  Use high risk prevention interventions High = Score of 45 and higher   [x] Give patient handout and discuss fall prevention strategies   [x] Establish goal of education for patient/family Re: fall prevention strategies   [] Discuss lifeline / other resources            Range of Motion  Left Range of Motion  Right Strength  Left Strength  Right   Lumbar Flexion 90%  Weak returning to neutral 3/5   Lumbar Extension 50%  Central back pain 4-/5   Lumbar Rotation 50%  Central back pain 50%  Central back pain 3+/5 3+/5   Lumbar Side Bend 50%  Central back pain 50%  Central back pain 3+/5 3+/5   Hip Flexion WNL for all below WNL for all below 3/5 3+/5   Hip Abduction   3/5 3+/5   Hip Adduction   3+/5 4-/5   Hip Extension   3-/5 4-/5   Hip ER       Hip IR       Knee Flexion   4-/5 4/5   Knee Extension   3-/5 4-/5   Dorsiflexion   4-/5 4/5   Plantar flexion   4-/5 4/5   Inversion       Eversion       *All LE MMT performed in modified (seated) positioning    LUMBAR/SIJ SPECIAL TESTS Left Right   Standing Flexion + [x]         - []           NT []  + []         - []           NT []    Repeated Flexion + []         - [x]           NT []  + []         - []           NT []    Repeated Extension + []         - [x]           NT []  + []         - []           NT []    SLR + []         - [x]           NT []  + []         - []           NT []    Slump Test + []         - [x]           NT []  + []         - []           NT []    Prone Instability Test + []         - []           NT [x]  + []         - []           NT []    Anterior Gapping + []         - []           NT [x]  + []         - []           NT []    Posterior Gapping + []         - []           NT [x]  + []         - []           NT []    Sacral Thrust + []         - [] NT [x]  + []         - []           NT []    Thigh Thrust + []         - []           NT [x]  + []         - []           NT []    Gaenslen's + []         - []           NT [x]  + []         - []           NT []    Other:  + []         - []           NT []  + []         - []           NT []      HIP SPECIAL TESTS Left Right   MILES + []         - [x]           NT []  + []         - []           NT []    FADIR + []         - [x]           NT []  + []         - []           NT []    Scour + []         - []           NT [x]  + []         - []           NT []    Trendelenburg Sign + [x]         - []           NT []   Weakness + [x]         - []           NT []   Weakness   Patrick + []         - [x]           NT []  + []         - []           NT []    Long axis traction + []         - []           NT []    + []         - []           NT []    Other: + []         - []           NT []  + []         - []           NT []        NEURAL/VASCULAR TESTS Left Right   Sciatic Nerve Tensioning + []         - [x]           NT []  + []         - []           NT []    Tibial Nerve Tensioning + []         - [x]           NT []  + []         - []           NT []    Sural Nerve Tensioning + []         - [x]           NT []  + []         - []           NT []    Common Peroneal Nerve Tensioning + []         - [x]           NT []  + []         - []           NT []    Femoral Nerve Tensioning + []         - []           NT [x]  + []         - []           NT []    Other: + []         - []           NT []  + []         - []           NT []      MUSCLE LENGTH TESTING Normal Left Tight Right Tight   Glutes []  [x]  []    Piriformis []  [x]  []    ITB/TFL []  []  []    Hip Flexors []  []  []    Quads []  []  []    Hamstrings []  []  []    Gastrocnemius []  []  []    Soleus []  []  []     []  []  []     []  []  []        Gait: Independent []           Modified Independent [x]            Not independent []  Comments: Ambulates independently with a  SPC but with a slow, guarded javed. Able to ambulate short household distances without an AD but with fear/apprehension and discontinuous steps. Unsteady with turning. Functional Mobility Testing:  Five Time Sit to Stand Test: 16 seconds with no UE support  Timed Up and Go: 21 seconds with no UE support. Discontinuous steps and very guarded/slow/unstable with turning     Tinetti Balance & Gait Assessment     0 1 2   Sitting balance Leans/slides in chair  [] Steady; safe  [x]    Rises from chair Unable without help  [] Uses arms  [] Able without arms  [x]   Attempts to rise Unable without help  [] >1 attempt  [] 1 attempt  [x]   Immediate standing balance Unsteady  [] Steady w/AD  [] Steady;  Independent  [x]   Standing balance Unsteady  [] Wide RICA  [x] Steady; neutral RICA  []   Nudged Begins to fall  [x] Staggers/Grabs/Catches  [] Steady  []   Standing (eyes closed) Unsteady  [x] Steady  []    360 deg Turn (steps) Discontinuous steps  [x] Continuous steps  []    360 deg Turn (steadiness) Unsteady  [x] Steady  []    Sitting Down Unsafe  [] Uses arms; not smooth  [] Safe and smooth  [x]         Gait initiation Hesitancy  [x] No hesitancy  []    Step Length (R) R swing foot does not pass stance foot  [] R swing foot passes stance foot  [x]    Step Length (L) L swing foot does not pass stance foot  [] L swing foot passes stance foot  [x]    Step Height (R) R foot does not clear completely  [] R foot clears completely  [x]    Step Height (L) L foot does not clear completely  [] L foot clears completely  [x]    Step Symmetry Not equal  [] Equal  []    Step Continuity Discontinuous  [x] Continuous  []    Walking Path Marked deviation  [x] AD  [] Straight w/o AD  []   Trunk Marked sway or AD  [x] Flexed knees/back or arms out  [] No sway/deviation  []   Walking Stance Heels apart  [x] Heels almost touch  []     Balance score: 10 / 16  Gait score: 4 / 12  Total score: 14 / 28  Fall risk: Low (>/=24)  []      Moderate (19-23)  []       High (</=18)  [x]        Assessment:  Problems:    [x] ? Pain:  [x] ? ROM:  [x] ? Strength:  [x] ? Function:  [] Other:    Pt is a 66 y/o F referred to PT with chronic hx of L low back/hip pain with referred pain and numbness/paresthesias into her L LE. Pt is well known to this facility with multiple prior episodes of care, but has unfortunately has had a couple setbacks and hospital admissions due to intractable pain. Pt presents to evaluation today with functional decline compared to presentation at previous episode of care. Demonstrates generalized weakness through B LEs (L more significant than R) with significant unsteadiness on feet, reliant on [facility] for stability. Referred pain and paresthesias into L LE is mostly constant at this point, unable to influence or centralize sx with positioning today. Significant core weakness present as well as impaired static & dynamic stability, with unsteadiness especially evident with turning/pivoting motions. Currently functioning at a high fall risk given all functional mobility testing, balance assessments and fall history. Skilled PT intervention is required to help alleviate pain, improve mobility, and maximize global strength/stability necessary to improve safe and efficient function through all ADLs and community activities at her prior level. Pt has previously had good results in aquatic therapy, requesting to continue treatment in this environment of care. No contraindications noted with aquatics at this time. Pt also demonstrates continued fine motor weakness and numbness in B hands, inquiring about continuing OT. Encouraged pt to reach out to her referring provider for an updated OT script.      STG: (to be met in 8 treatments)  Pt will self report worst pain no greater than 3/10 in order to better tolerate ADLs/work activities with minimal dysfunction  Pt will improve lumbar AROM to 90% or greater in all planes in order to demonstrate ability to move/reach in all planes unrestricted at PLOF  Pt will improve trunk strength to 4/5 or greater in all planes to show improved stability at prior level  Pt will improve FTSTS to <13 seconds with no UE support to show improved safety/efficiency and strength with functional transfers  LTG: (to be met in 16 treatments)  Pt will demonstrate improved B LE strength to 4/5 or greater in order to demonstrate improved stability/strength necessary for unrestricted ADLs/work activities   Pt will improve TUG to <12 seconds with no AD and no unsteadiness to demonstrate improved safety/efficiency with household mobility and minimal risk for falls  Pt will improve Tinetti score to 24/28 or greater to show improved balance and gait stability with minimal risk for falls  Pt will decrease SHEY to 10% impaired or less in order to demonstrate improved functional tolerances at PLOF with minimal restriction/dysfunction  Pt will demonstrate independence with a long term HEP for continued progress/maintenance after completion of PT      Functional Assessment Used: Mod. SHEY  Current Status Score: 54% impaired  Goal Status Score: 10% impaired    Evaluation Complexity:  History (Personal factors, comorbidities) [] 0 [] 1-2 [x] 3+   Exam (limitations, restrictions) [] 1-2 [x] 3 [] 4+   Clinical presentation (progression) [] Stable [x] Evolving  [] Unstable   Decision Making [] Low [x] Moderate [] High    [] Low Complexity [x] Moderate Complexity [] High Complexity     Rehab Potential:  [x] Good  [x] Fair  [] Poor   Suggested Professional Referral:  [] No  [x] Yes: Recommend updated OT referral to address pt's complaints of B UE fine motor deficits  Barriers to Goal Achievement[de-identified]  [x] No  [] Yes:  Domestic Concerns:  [x] No  [] Yes:    Pt. Education:  [x] Plans/Goals, Risks/Benefits discussed  [x] Home exercise program  Method of Education: [x] Verbal  [x] Demo  [x] Written   Comprehension of Education:  [x] Verbalizes understanding.   [x] Demonstrates understanding. [] Needs Review. [] Demonstrates/verbalizes understanding of HEP/Ed previously given. Access Code: [email or web address]  URL: [email or web address]  Date: 03/08/2023  Prepared by: Mariela Joyce    Exercises  Straight Leg Raise - 2 x daily - 7 x weekly - 3 sets - 5-8 reps  Supine Bridge - 2 x daily - 7 x weekly - 3 sets - 10 reps  Clamshell - 2 x daily - 7 x weekly - 3 sets - 10 reps  Sit to Stand - 2 x daily - 7 x weekly - 3 sets - 10 reps  Seated Figure 4 Piriformis Stretch - 1 x daily - 7 x weekly - 3 sets - 30 hold      Treatment Plan:  [x] Therapeutic Exercise   96025  [] Iontophoresis: 4 mg/mL Dexamethasone Sodium Phosphate  mAmin  75005   [] Therapeutic Activity  81448 [] Vasopneumatic cold with compression  47065    [] Gait Training   87490 [] Ultrasound   [id number]   [] Neuromuscular Re-education  51927 [] Electrical Stimulation Unattended  87567   [] Manual Therapy  17693 [] Electrical Stimulation Attended  61669   [x] Instruction in HEP  [] Lumbar/Cervical Traction  88923   [x] Aquatic Therapy   71826 [] Cold/hotpack    [] Massage   91451      [] Dry Needling, 1 or 2 muscles  89964   [] Biofeedback, first 15 minutes   64445  [] Biofeedback, additional 15 minutes   55803 [] Dry Needling, 3 or more muscles  48207        Frequency: 2x/week x 16 visits     Todays Treatment:  Modalities:   Precautions: Fall risk  Exercises:  Exercise Reps/ Time Weight/ Level Comments   SLRs 5x R/L  Initial HEP   Bridges 10x  Initial HEP   Clams 10x R/L  Initial HEP   Sit to stands 10x  No UE support;  Initial HEP   Seated figure 4 stretch (L) 30''   Initial HEP   Patient education 5'  Aquatics orientation  Fall precaution education- use AD at all times, importance of proper lighting, minimizing clutter as much as possible   Other:    Specific Instructions for next treatment: Initiate aquatic based PT intervention focusing on lumbopelvic stability and progressive global strengthening as appropriate. Work to improve general stability and balance reactions. Treatment Charges: Mins Units   [x] Evaluation       []  Low       [x]  Moderate       []  High 38 1   []  Modalities     [x]  Ther Exercise 10 1   []  Manual Therapy     []  Ther Activities     []  Aquatics     []  Neuromuscular     []  Gait Training     []  Dry Needling           1-2 muscles     []  Dry Needling           3 or more muscles     [] Vasocompression     []  Other       TOTAL TREATMENT TIME: 48    Time in: 2:45pm   Time Out: 3:33pm    Electronically signed by: Julissa Lindsey PT        Physician Signature:________________________________Date:__________________  By signing above or cosigning this note, I have reviewed this plan of care and certify a need for medically necessary rehabilitation services.      *PLEASE SIGN ABOVE AND FAX BACK ALL PAGES*

## 2023-03-08 ENCOUNTER — HOSPITAL ENCOUNTER (OUTPATIENT)
Dept: PHYSICAL THERAPY | Age: 63
Setting detail: THERAPIES SERIES
Discharge: HOME | End: 2023-03-08
Payer: COMMERCIAL

## 2023-03-08 PROCEDURE — 97162 PT EVAL MOD COMPLEX 30 MIN: CPT

## 2023-03-08 PROCEDURE — 97110 THERAPEUTIC EXERCISES: CPT

## 2023-03-14 ENCOUNTER — HOSPITAL ENCOUNTER (OUTPATIENT)
Dept: PHYSICAL THERAPY | Age: 63
Setting detail: THERAPIES SERIES
Discharge: HOME | End: 2023-03-14
Payer: COMMERCIAL

## 2023-03-14 PROCEDURE — 97113 AQUATIC THERAPY/EXERCISES: CPT

## 2023-03-14 NOTE — FLOWSHEET NOTE
[] [facility] HOSPITAL & Therapy  [address]  [place]: [phone]   F: [phone]    Physical Therapy Daily Treatment Note      Date:  3/14/2023  Patient Name:  Remedios Vargas    :  1960  MRN: [id number]  Physician: Janae Carty MD (resident physician)  Colleen Nguyen MD (attending physician)                                                Insurance: [address] [place] [address] Medicaid. Auth required after 30 visits. Medical Diagnosis: M54.42, G89.29 (ICD-10-CM) - Chronic left-sided low back pain with left-sided sciatica                          Rehab Codes: M54.42, M62.81, R26.9, R29.6  Onset Date: 2021 with multiple recent exacerbations                          Next 's appt: 23 with neurosurgery. 5/10/23 with neurology  Visit# / total visits:   Cancels/No Shows: 0/0    Subjective:  Reports she has not had a chance to follow up with PCP regarding order for OT. Reports neuro has discussed possible cervical surgery C3-7 which may also help with her limitations with fine motor. States her back pain is constant and continues to radiate down entire L LE to foot    Pain:  [x] Yes  [] No Location: Lower back into B buttocks/hips; down left lateral LE to foot  Pain Rating: (0-10 scale) 6/10  Pain altered Tx:  [x] No  [] Yes  Action:    Comments: Initiated aquatic therapy with emphasis on improved postural awareness, core stability and pain control.  Patient encouraged to avoid excessive movements and to control speed within aquatic environment to avoid aggravating symptoms    Objective:  Modalities:   Precautions: FALL RISK  Exercises:      [address] [facility] Exercise Log  Aquatic, Hip & DLS Program- Phase 1    Date of Eval:  3/8/23                              Primary PT: Lena Angeles, PT      Date 3/14/23       Visit # 2/       Walk F/L/R 2 Laps       Marching 10x       Squats 5x3\"       Step-Ups F/L        Step Down F/L        Heel-toe raises 10x       SLR F/L/R 10x       Hip/Knee Flex/Ext        F/L Lunges                Kickboard Ex. Small       Iso Abd. Vert 10x5\"       Push-pull  10x       Paddling                UE Format: Chest Deep       Horiz Abd/Add 10x       IR/ER (wipers)        Alt Flex/Ext 10x       Alt Press Down        Abd/Add 10x               Deep Water: 1 noodle       Hang 3'       Cycling 1'       Jacks        X-Country                Balance        SLS                Stretches        Achllies        Hamstring                Breast Stroke 2 Laps       Pain Rating 6           Specific Instructions for next treatment: Assess response to initial aquatics visit and progress as symptoms allow      Assessment: [x] Progressing toward goals. Emphasis throughout program on proper technique and trunk stabilization. Patient performed activity with good challenge to balance- encouraged little to no UE support without issue. Patient does notice decreased ability to WB on L LE during SL activity when compared to R LE. Added long lever UE activity while encouraging activation of core. Finished with deep water aerobic cycling and unloading to assist with pain. Patient tolerated well; denies increased symptoms    [] No change. [] Other:    [x] Skilled PT intervention is required to help alleviate pain, improve mobility, and maximize global strength/stability necessary to improve safe and efficient function through all ADLs and community activities at her prior level.       STG: (to be met in 8 treatments)  Pt will self report worst pain no greater than 3/10 in order to better tolerate ADLs/work activities with minimal dysfunction  Pt will improve lumbar AROM to 90% or greater in all planes in order to demonstrate ability to move/reach in all planes unrestricted at PLOF  Pt will improve trunk strength to 4/5 or greater in all planes to show improved stability at prior level  Pt will improve FTSTS to <13 seconds with no UE support to show improved safety/efficiency and strength with functional transfers  LTG: (to be met in 16 treatments)  Pt will demonstrate improved B LE strength to 4/5 or greater in order to demonstrate improved stability/strength necessary for unrestricted ADLs/work activities   Pt will improve TUG to <12 seconds with no AD and no unsteadiness to demonstrate improved safety/efficiency with household mobility and minimal risk for falls  Pt will improve Tinetti score to 24/28 or greater to show improved balance and gait stability with minimal risk for falls  Pt will decrease SHEY to 10% impaired or less in order to demonstrate improved functional tolerances at PLOF with minimal restriction/dysfunction  Pt will demonstrate independence with a long term HEP for continued progress/maintenance after completion of PT    Patient Goals/Rehab Expectations: To improve strength and stability, decrease pain     Pt. Education:  [x] Yes  [] No  [] Reviewed Prior HEP/Ed- benefits of aquatic therapy and importance of postural awareness/core stability  Method of Education: [x] Verbal  [x] Demo  [] Written  Comprehension of Education:  [x] Verbalizes understanding. [x] Demonstrates understanding. [] Needs review. [] Demonstrates/verbalizes HEP/Ed previously given. Access Code: [email or web address]  URL: [email or web address]  Date: 03/08/2023  Prepared by: Lena Cassette     Exercises  Straight Leg Raise - 2 x daily - 7 x weekly - 3 sets - 5-8 reps  Supine Bridge - 2 x daily - 7 x weekly - 3 sets - 10 reps  Clamshell - 2 x daily - 7 x weekly - 3 sets - 10 reps  Sit to Stand - 2 x daily - 7 x weekly - 3 sets - 10 reps  Seated Figure 4 Piriformis Stretch - 1 x daily - 7 x weekly - 3 sets - 30 hold     Plan: [x] Continue per plan of care.    [] Other:      Treatment Charges: Mins Units   []  Modalities     []  Ther Exercise     []  Manual Therapy     []  Ther Activities     [x]  Aquatics 31 2   []  Neuromuscular     [] Vasocompression     [] Gait Training     [] Dry needling        [] 1 or 2 muscles        [] 3 or more muscles     []  Other     Total Treatment time 31 2     Time In: 453 PM            Time Out: 331 PM    Electronically signed by:  Robert Junior PTA

## 2023-03-16 ENCOUNTER — HOSPITAL ENCOUNTER (OUTPATIENT)
Dept: PHYSICAL THERAPY | Age: 63
Setting detail: THERAPIES SERIES
Discharge: HOME | End: 2023-03-16
Payer: COMMERCIAL

## 2023-03-16 PROCEDURE — 97113 AQUATIC THERAPY/EXERCISES: CPT

## 2023-03-16 NOTE — FLOWSHEET NOTE
[] [facility] HOSPITAL & Therapy  [address]  [place]: [phone]   F: [phone]    Physical Therapy Daily Treatment Note      Date:  3/16/2023  Patient Name:  Gil Jackson    :  1960  MRN: [id number]  Physician: Sander Irwin MD (resident physician)  Ayan Linares MD (attending physician)                                                Insurance: [address] [place] [address] Medicaid. Auth required after 30 visits. Medical Diagnosis: M54.42, G89.29 (ICD-10-CM) - Chronic left-sided low back pain with left-sided sciatica                          Rehab Codes: M54.42, M62.81, R26.9, R29.6  Onset Date: 2021 with multiple recent exacerbations                          Next 's appt: 23 with neurosurgery. 5/10/23 with neurology  Visit# / total visits: 3/16  Cancels/No Shows: 0/0    Subjective:  Notes continued pain with lower back , L LE and R hip this date. Denies issues after initial pool visit    Pain:  [x] Yes  [] No Location: Lower back into B buttocks/hips; down left lateral LE to foot ; R hip Pain Rating: (0-10 scale) 5-6/10  Pain altered Tx:  [x] No  [] Yes  Action:    Comments:     Objective:  Modalities:   Precautions: FALL RISK  Exercises:      [facility] Exercise Log  Aquatic, Hip & DLS Program- Phase 1    Date of Eval:  3/8/23                              Primary PT: Maryse Hudson, PT      Date 3/14/23 3/16/23      Visit # 2/16 3/16      Walk F/L/R 2 Laps 2 Laps      Marching 10x 10x Add Lap     Squats 5x3\" 10x5\"      Step-Ups F/L        Step Down F/L        Heel-toe raises 10x 10x      SLR F/L/R 10x 10x      Hip/Knee Flex/Ext   Add     F/L Lunges                Kickboard Ex. Small Small      Iso Abd.  Vert 10x5\" 10x5\"      Push-pull  10x 10x      Paddling                UE Format: Chest Deep Chest Deep      Horiz Abd/Add 10x 10x      IR/ER (wipers)  10x      Alt Flex/Ext 10x 10x      Alt Press Down  10x Abd/Add 10x 10x              Deep Water: 1 noodle 1 Noodle      Hang 3' 6'      Cycling 1' 1'      Jacks  1'      X-Country  1'              Balance        SLS                Stretches        Achllies        Hamstring                Breast Stroke 2 Laps 2 Laps      Pain Rating 6 5-6          Specific Instructions for next treatment: Add marching lap and hip/knee flex/ext as tolerated. Assessment: [x] Progressing toward goals. Emphasis throughout program on proper technique, improved postural awareness and core activation. Added in short lever UE format exercise to challenge trunk stabilization - patient noted mild lower back soreness. Progressed with deep water hip abd/add and flex/ext unloaded- patient noted some relief with her R hip soreness. Finished with deep water decompression to assist with pain. [] No change. [] Other:    [x] Skilled PT intervention is required to help alleviate pain, improve mobility, and maximize global strength/stability necessary to improve safe and efficient function through all ADLs and community activities at her prior level.       STG: (to be met in 8 treatments)  Pt will self report worst pain no greater than 3/10 in order to better tolerate ADLs/work activities with minimal dysfunction  Pt will improve lumbar AROM to 90% or greater in all planes in order to demonstrate ability to move/reach in all planes unrestricted at PLOF  Pt will improve trunk strength to 4/5 or greater in all planes to show improved stability at prior level  Pt will improve FTSTS to <13 seconds with no UE support to show improved safety/efficiency and strength with functional transfers  LTG: (to be met in 16 treatments)  Pt will demonstrate improved B LE strength to 4/5 or greater in order to demonstrate improved stability/strength necessary for unrestricted ADLs/work activities   Pt will improve TUG to <12 seconds with no AD and no unsteadiness to demonstrate improved safety/efficiency with household mobility and minimal risk for falls  Pt will improve Tinetti score to 24/28 or greater to show improved balance and gait stability with minimal risk for falls  Pt will decrease SHEY to 10% impaired or less in order to demonstrate improved functional tolerances at PLOF with minimal restriction/dysfunction  Pt will demonstrate independence with a long term HEP for continued progress/maintenance after completion of PT    Patient Goals/Rehab Expectations: To improve strength and stability, decrease pain     Pt. Education:  [] Yes  [] No  [x] Reviewed Prior HEP/Ed- benefits of aquatic therapy and importance of postural awareness/core stability  Method of Education: [x] Verbal  [x] Demo  [] Written  Comprehension of Education:  [x] Verbalizes understanding. [x] Demonstrates understanding. [] Needs review. [] Demonstrates/verbalizes HEP/Ed previously given. Access Code: [email or web address]  URL: [email or web address]  Date: 03/08/2023  Prepared by: Stella Cool     Exercises  Straight Leg Raise - 2 x daily - 7 x weekly - 3 sets - 5-8 reps  Supine Bridge - 2 x daily - 7 x weekly - 3 sets - 10 reps  Clamshell - 2 x daily - 7 x weekly - 3 sets - 10 reps  Sit to Stand - 2 x daily - 7 x weekly - 3 sets - 10 reps  Seated Figure 4 Piriformis Stretch - 1 x daily - 7 x weekly - 3 sets - 30 hold     Plan: [x] Continue per plan of care.    [] Other:      Treatment Charges: Mins Units   []  Modalities     []  Ther Exercise     []  Manual Therapy     []  Ther Activities     [x]  Aquatics 38 3   []  Neuromuscular     [] Vasocompression     [] Gait Training     [] Dry needling        [] 1 or 2 muscles        [] 3 or more muscles     []  Other     Total Treatment time 38 3     Time In: 306 PM            Time Out: 350 PM    Electronically signed by:  Nathan Wayne PTA

## 2023-03-21 ENCOUNTER — HOSPITAL ENCOUNTER (OUTPATIENT)
Dept: PHYSICAL THERAPY | Age: 63
Setting detail: THERAPIES SERIES
Discharge: HOME | End: 2023-03-21
Payer: COMMERCIAL

## 2023-03-21 PROCEDURE — 97113 AQUATIC THERAPY/EXERCISES: CPT

## 2023-03-21 NOTE — FLOWSHEET NOTE
03/08/2023  Prepared by: Nigel Weller     Exercises  Straight Leg Raise - 2 x daily - 7 x weekly - 3 sets - 5-8 reps  Supine Bridge - 2 x daily - 7 x weekly - 3 sets - 10 reps  Clamshell - 2 x daily - 7 x weekly - 3 sets - 10 reps  Sit to Stand - 2 x daily - 7 x weekly - 3 sets - 10 reps  Seated Figure 4 Piriformis Stretch - 1 x daily - 7 x weekly - 3 sets - 30 hold     Plan: [x] Continue per plan of care.    [] Other:      Treatment Charges: Mins Units   []  Modalities     []  Ther Exercise     []  Manual Therapy     []  Ther Activities     [x]  Aquatics 28 2   []  Neuromuscular     [] Vasocompression     [] Gait Training     [] Dry needling        [] 1 or 2 muscles        [] 3 or more muscles     []  Other     Total Treatment time 28 2     Time In: 304 PM            Time Out: 336  PM    Electronically signed by:  Cally Cuevas PTA

## 2023-03-23 ENCOUNTER — HOSPITAL ENCOUNTER (OUTPATIENT)
Dept: PHYSICAL THERAPY | Age: 63
Setting detail: THERAPIES SERIES
Discharge: HOME | End: 2023-03-23
Payer: COMMERCIAL

## 2023-03-23 NOTE — FLOWSHEET NOTE
[] [facility]) - [facility] & Therapy  [address]  [place]: [phone]   F: [phone]     Physical Therapy Cancel/No Show note    Date: 3/23/2023  Patient: Raúl Carvajal  : 1960  MRN: [id number]    Visit Count:   Cancels/No Shows to date:     For today's appointment patient:    [x]  Cancelled    [] Rescheduled appointment    [] No-show     Reason given by patient:    []  Patient ill    []  Conflicting appointment    [] No transportation      [] Conflict with work    [] No reason given    [] Weather related    [] COVID-19    [x] Other: Per  patient having too much back pain to attend     Comments:        [x] Next appointment was confirmed    Electronically signed by: Ashutosh Esteban PTA

## 2023-03-28 ENCOUNTER — HOSPITAL ENCOUNTER (OUTPATIENT)
Dept: PHYSICAL THERAPY | Age: 63
Setting detail: THERAPIES SERIES
Discharge: HOME | End: 2023-03-28
Payer: COMMERCIAL

## 2023-03-28 NOTE — FLOWSHEET NOTE
[] [facility]) - [facility] & Therapy  [address]  [place]: [phone]   F: [phone]     Physical Therapy Cancel/No Show note    Date: 3/28/2023  Patient: Gayle Gonsalez  : 1960  MRN: [id number]    Visit Count:   Cancels/No Shows to date:     For today's appointment patient:    [x]  Cancelled    [] Rescheduled appointment    [] No-show     Reason given by patient:    []  Patient ill    []  Conflicting appointment    [] No transportation      [] Conflict with work    [] No reason given    [] Weather related    [] COVID-19    [x] Other: Per , thinks she pulled a muscle in her back- can't make it today   Comments:        [x] Next appointment was confirmed    Electronically signed by: Kwasi Ledesma PTA

## 2023-03-30 ENCOUNTER — HOSPITAL ENCOUNTER (OUTPATIENT)
Dept: PHYSICAL THERAPY | Age: 63
Setting detail: THERAPIES SERIES
Discharge: HOME | End: 2023-03-30
Payer: COMMERCIAL

## 2023-03-30 PROCEDURE — 97113 AQUATIC THERAPY/EXERCISES: CPT

## 2023-03-30 NOTE — FLOWSHEET NOTE
ability to move/reach in all planes unrestricted at PLOF  Pt will improve trunk strength to 4/5 or greater in all planes to show improved stability at prior level  Pt will improve FTSTS to <13 seconds with no UE support to show improved safety/efficiency and strength with functional transfers  LTG: (to be met in 16 treatments)  Pt will demonstrate improved B LE strength to 4/5 or greater in order to demonstrate improved stability/strength necessary for unrestricted ADLs/work activities   Pt will improve TUG to <12 seconds with no AD and no unsteadiness to demonstrate improved safety/efficiency with household mobility and minimal risk for falls  Pt will improve Tinetti score to 24/28 or greater to show improved balance and gait stability with minimal risk for falls  Pt will decrease SHEY to 10% impaired or less in order to demonstrate improved functional tolerances at PLOF with minimal restriction/dysfunction  Pt will demonstrate independence with a long term HEP for continued progress/maintenance after completion of PT    Patient Goals/Rehab Expectations: To improve strength and stability, decrease pain     Pt. Education:  [] Yes  [] No  [x] Reviewed Prior HEP/Ed- benefits of aquatic therapy and importance of postural awareness/core stability; avoidance of increasing pain  Method of Education: [x] Verbal  [x] Demo  [] Written  Comprehension of Education:  [x] Verbalizes understanding. [x] Demonstrates understanding. [] Needs review. [] Demonstrates/verbalizes HEP/Ed previously given. Access Code: [email or web address]  URL: [email or web address]  Date: 03/08/2023  Prepared by: Helga Chew     Exercises  Straight Leg Raise - 2 x daily - 7 x weekly - 3 sets - 5-8 reps  Supine Bridge - 2 x daily - 7 x weekly - 3 sets - 10 reps  Clamshell - 2 x daily - 7 x weekly - 3 sets - 10 reps  Sit to Stand - 2 x daily - 7 x weekly - 3 sets - 10 reps  Seated Figure 4 Piriformis Stretch - 1 x daily - 7 x weekly - 3 sets - 30

## 2023-04-04 ENCOUNTER — HOSPITAL ENCOUNTER (OUTPATIENT)
Dept: PHYSICAL THERAPY | Age: 63
Setting detail: THERAPIES SERIES
Discharge: HOME | End: 2023-04-04
Payer: COMMERCIAL

## 2023-04-04 ENCOUNTER — APPOINTMENT (OUTPATIENT)
Dept: PHYSICAL THERAPY | Age: 63
End: 2023-04-04
Payer: COMMERCIAL

## 2023-04-04 PROCEDURE — 97113 AQUATIC THERAPY/EXERCISES: CPT

## 2023-04-04 NOTE — FLOWSHEET NOTE
[] 1 or 2 muscles        [] 3 or more muscles     []  Other     Total Treatment time 35 2     Time In: 254 PM            Time Out:  333 PM    Electronically signed by:  Elmer Boogie PTA

## 2023-04-06 ENCOUNTER — HOSPITAL ENCOUNTER (OUTPATIENT)
Dept: PHYSICAL THERAPY | Age: 63
Setting detail: THERAPIES SERIES
Discharge: HOME | End: 2023-04-06
Payer: COMMERCIAL

## 2023-04-06 PROCEDURE — 97113 AQUATIC THERAPY/EXERCISES: CPT

## 2023-04-06 NOTE — FLOWSHEET NOTE
[] [facility] HOSPITAL & Therapy  [address]  [place]: [phone]   F: [phone]    Physical Therapy Daily Treatment Note      Date:  2023  Patient Name:  Marlene Cai    :  1960  MRN: [id number]  Physician: Uday Doherty MD (resident physician)  Neha Gant MD (attending physician)                                                Insurance: [address] [place] [place] [address] Medicaid. Auth required after 30 visits. Medical Diagnosis: M54.42, G89.29 (ICD-10-CM) - Chronic left-sided low back pain with left-sided sciatica                          Rehab Codes: M54.42, M62.81, R26.9, R29.6  Onset Date: 2021 with multiple recent exacerbations                          Next 's appt: 23 with neurosurgery. 5/10/23 with neurology  Visit# / total visits:   Cancels/No Shows: 2/0    Subjective:  Denies issues after last visit- states her pain is a little less today overall    Pain:  [x] Yes  [] No Location/Pain Rating: (0-10 scale) : Lower back into left buttocks/lateral hip; down left lateral LE to foot 4-5/10   Pain altered Tx:  [x] No  [] Yes  Action:     Comments:     Objective:  Modalities:   Precautions: FALL RISK  Exercises:      [address] [place] [address] Exercise Log  Aquatic, Hip & DLS Program- Phase 1    Date of Eval:  3/8/23                              Primary PT: Summer Sahu, PT      Date 3/21/23 3/30/23 4/4/23 4/6/23    Visit #     Walk F/L/R 2 Laps 2 Laps 2 Laps 2 Laps    Marching 10x 1 Lap 2 Laps 2 Laps    Squats 10x5\" Pt Deferred 10x3\" 10x3\"    Step-Ups F/L     Add   Step Down F/L        Heel-toe raises 10x 10x 10x 10x    SLR F/L/R 10x 10x 10x 10x    Hip/Knee Flex/Ext Add 8x  10x 10x    F/L Lunges                Kickboard Ex. Patient deferred Patient deferred -     Iso Abd.  Vert - - -     Push-pull  - - -     Paddling                UE Format: Chest Deep Chest Deep Chest deep  Chest Deep

## 2023-04-11 PROBLEM — N18.30 BENIGN HYPERTENSION WITH CKD (CHRONIC KIDNEY DISEASE) STAGE III (HCC): Status: ACTIVE | Noted: 2023-04-11

## 2023-04-11 PROBLEM — I12.9 BENIGN HYPERTENSION WITH CKD (CHRONIC KIDNEY DISEASE) STAGE III (HCC): Status: ACTIVE | Noted: 2023-04-11

## 2023-04-11 PROBLEM — E13.22 SECONDARY DIABETES MELLITUS WITH STAGE 2 CHRONIC KIDNEY DISEASE (GFR 60-89) (HCC): Status: ACTIVE | Noted: 2023-04-11

## 2023-04-11 PROBLEM — N18.2 SECONDARY DIABETES MELLITUS WITH STAGE 2 CHRONIC KIDNEY DISEASE (GFR 60-89) (HCC): Status: ACTIVE | Noted: 2023-04-11

## 2023-04-11 PROBLEM — M46.1 SACROILIITIS (HCC): Status: ACTIVE | Noted: 2023-04-11

## 2023-04-17 DIAGNOSIS — E11.9 TYPE 2 DIABETES MELLITUS WITHOUT COMPLICATION, WITHOUT LONG-TERM CURRENT USE OF INSULIN (HCC): ICD-10-CM

## 2023-04-17 NOTE — TELEPHONE ENCOUNTER
(goal 120/80)    All Future Testing planned in [facility]  Lab Frequency Next Occurrence   Saline lock IV Once 05/10/2022   Lipid Panel Once 06/26/2022   Comprehensive Metabolic Panel Once 57/46/0682   Vitamin D 25 Hydroxy Once 05/26/2022   FORREST DIGITAL SCREEN W OR WO CAD BILATERAL Once 04/11/2023               Patient Active Problem List:     Cervical spondylosis     Type 2 diabetes mellitus without complication, without long-term current use of insulin (HCC)     Hypertension     Abnormal auditory perception     Nasal polyps     Osteoarthritis of left knee     Osteoarthritis of right knee     DIEGO (nonalcoholic steatohepatitis)     Vitamin D deficiency     Iron deficiency anemia     Dyslipidemia     Trochanteric bursitis     Chronic left shoulder pain     Restless legs syndrome     Generalized anxiety disorder     Toxic metabolic encephalopathy     Class 2 obesity in adult     Leg edema     Recurrent major depressive disorder (HCC)     Dermatitis     Retrolisthesis of vertebrae     Bilateral carpal tunnel syndrome     Intention tremor     Sciatica     Back pain with radiation     Left sciatic nerve pain     Lumbar disc disease with radiculopathy     Intractable back pain     Piriformis syndrome of left side     Ulnar neuropathy at elbow, left     S/P decompression of ulnar nerve at elbow     Cervical myelopathy with cervical radiculopathy (HCC)     Lumbar spondylosis     Right median nerve neuropathy     Sacroiliitis (HCC)     Secondary diabetes mellitus with stage 2 chronic kidney disease (GFR 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

## 2023-04-18 ENCOUNTER — HOSPITAL ENCOUNTER (OUTPATIENT)
Dept: PHYSICAL THERAPY | Age: 63
Setting detail: THERAPIES SERIES
Discharge: HOME | End: 2023-04-18
Payer: COMMERCIAL

## 2023-04-18 DIAGNOSIS — E11.9 TYPE 2 DIABETES MELLITUS WITHOUT COMPLICATION, WITHOUT LONG-TERM CURRENT USE OF INSULIN (HCC): ICD-10-CM

## 2023-04-18 PROCEDURE — 97113 AQUATIC THERAPY/EXERCISES: CPT

## 2023-04-18 RX ORDER — DULAGLUTIDE 1.5 MG/.5ML
INJECTION, SOLUTION SUBCUTANEOUS
Qty: 6 ML | Refills: 2 | Status: SHIPPED | OUTPATIENT
Start: 2023-04-18

## 2023-04-18 RX ORDER — DULAGLUTIDE 1.5 MG/.5ML
INJECTION, SOLUTION SUBCUTANEOUS
Qty: 6 ML | Refills: 2 | Status: CANCELLED | OUTPATIENT
Start: 2023-04-18

## 2023-04-18 NOTE — FLOWSHEET NOTE
[] [facility] HOSPITAL & Therapy  [address]  [place]: [phone]   F: [phone]    Physical Therapy Daily Treatment Note      Date:  2023  Patient Name:  Stephen Romano    :  1960  MRN: [id number]  Physician: Yao Flower MD (resident physician)  Josephine Soares MD (attending physician)                                                Insurance: [address] [place] [address] Medicaid. Auth required after 30 visits. Medical Diagnosis: M54.42, G89.29 (ICD-10-CM) - Chronic left-sided low back pain with left-sided sciatica                          Rehab Codes: M54.42, M62.81, R26.9, R29.6  Onset Date: 2021 with multiple recent exacerbations                          Next 's appt: 23 with neurosurgery. 5/10/23 with neurology  Visit# / total visits: 10/16  Cancels/No Shows: 2/0    Subjective:  No new complaints this date- states pain is about the same as last visit with same locations. To see neurosurgeon Friday for neck and back. Denies issues after last visit. Noted increased pain when sleeping on her right side. States she was also going though clothes and also noted increased back pain from forward bending- took tylenol arthritis. Reports she will be pursuing OT when she sees neuro this week due to numbness in all of fingers on left hand are numb    Pain:  [x] Yes  [] No Location/Pain Rating: (0-10 scale) :  Lower back into left buttocks/lateral hip; down left lateral LE to foot ; R buttocks/lateral hip 5/10   Pain altered Tx:  [x] No  [] Yes  Action:     Comments:     Objective:  Modalities:   Precautions: FALL RISK  Exercises:      [address] [facility] Exercise Log  Aquatic, Hip & DLS Program- Phase 1    Date of Eval:  3/8/23                              Primary PT: Bharat Shankar, PT      Date 23    Visit # 8/16 9/16 10/16    Walk F/L/R 2 Laps 1 Lap 2 Laps    Marching 2 Laps 1 Lap 2 Laps    Squats

## 2023-04-20 ENCOUNTER — HOSPITAL ENCOUNTER (OUTPATIENT)
Dept: PHYSICAL THERAPY | Age: 63
Setting detail: THERAPIES SERIES
Discharge: HOME | End: 2023-04-20
Payer: COMMERCIAL

## 2023-04-20 NOTE — FLOWSHEET NOTE
[] [facility]) - [facility] & Therapy  [address]  [place]: [phone]   F: [phone]     Physical Therapy Cancel/No Show note    Date: 2023  Patient: Derek Martines  : 1960  MRN: [id number]    Visit Count: 10/16  Cancels/No Shows to date: 3/0    For today's appointment patient:    [x]  Cancelled    [] Rescheduled appointment    [] No-show     Reason given by patient:    []  Patient ill    []  Conflicting appointment    [] No transportation      [] Conflict with work    [] No reason given    [] Weather related    [] [id number]    [x] Other: Per  patient is now in too much pain on her right side (opposite side)     Comments:        [x] Next appointment was confirmed    Electronically signed by: Roderick Lion PTA

## 2023-04-21 ENCOUNTER — OFFICE VISIT (OUTPATIENT)
Dept: NEUROSURGERY | Age: 63
End: 2023-04-21

## 2023-04-21 VITALS
OXYGEN SATURATION: 98 % | WEIGHT: 192 LBS | TEMPERATURE: 97.6 F | HEART RATE: 81 BPM | DIASTOLIC BLOOD PRESSURE: 72 MMHG | HEIGHT: 63 IN | BODY MASS INDEX: 34.02 KG/M2 | SYSTOLIC BLOOD PRESSURE: 106 MMHG

## 2023-04-21 DIAGNOSIS — G56.22 ULNAR NEUROPATHY AT ELBOW, LEFT: ICD-10-CM

## 2023-04-21 DIAGNOSIS — G95.9 CERVICAL MYELOPATHY WITH CERVICAL RADICULOPATHY (HCC): Primary | ICD-10-CM

## 2023-04-21 DIAGNOSIS — Z98.890 S/P DECOMPRESSION OF ULNAR NERVE AT ELBOW: ICD-10-CM

## 2023-04-21 DIAGNOSIS — M47.812 CERVICAL SPONDYLOSIS: ICD-10-CM

## 2023-04-21 DIAGNOSIS — M54.12 CERVICAL MYELOPATHY WITH CERVICAL RADICULOPATHY (HCC): Primary | ICD-10-CM

## 2023-04-21 NOTE — PROGRESS NOTES
regular rate; pulses equal  Abdomen: Soft nontender nondistended. Ext: DP and PT pulses 2+, good cap refill  Neuro    Mentation  Appropriate affect  Registration intact  Orientation intact  Judgement intact to situation    Cranial Nerves:   Pupils equal and reactive to light  Extraocular motion intact  Face and shrug symmetric  Tongue midline  No dysarthria  v1-3 sensation symmetric, masseter tone symmetric  Hearing symmetric    Sensation: diminished left hand, distal BLE    Motor  L deltoid 5/5; R deltoid 5/5  L biceps 5/5; R biceps 5/5  L triceps 5/5; R triceps 5/5  L wrist extension 4/5; R wrist extension 5/5  L intrinsics 4/5; R intrinsics 5/5     L iliopsoas 4+/5 , R iliopsoas 5/5  L quadriceps 5/5; R quadriceps 5/5  L Dorsiflexion 4+/5; R dorsiflexion 5/5  L Plantarflexion 5/5; R plantarflexion 5/5  L EHL 4+/5; R EHL 5/5    Reflexes  L Brachioradialis 2+/4; R brachioradialis 2+/4  L Biceps 2+/4; R Biceps 2+/4  L Triceps 2+/4; R Triceps 2+/4  L Patellar 2+/4: R Patellar 2+/4  L Achilles 2+/4; R Achilles 2+/4    hoffmans L: neg  hoffmans R: neg  Clonus L: neg  Clonus R: neg  Babinski L: neg  Babinski R: neg    Studies Review:     MRI cervical spine 2/20/2023:  FINDINGS:   BONES/ALIGNMENT: The vertebral body heights are maintained. There is   age-appropriate bone marrow signal.  There is multilevel degenerative disc   disease with loss of disc signal.  There is disc space narrowing at the C4-5,   C5-6, and C6-7 levels. There is no spondylolisthesis. SPINAL CORD: No abnormal cord signal is seen. SOFT TISSUES: No paraspinal mass identified. C2-C3: There is no significant disc protrusion, spinal canal stenosis or   neural foraminal narrowing. C3-C4: There is a disc osteophyte complex with uncovertebral and facet   hypertrophy. There is canal stenosis measuring 9 mm in AP dimension. There   is no significant foraminal narrowing.        C4-C5: There is a disc osteophyte complex with

## 2023-04-25 ENCOUNTER — HOSPITAL ENCOUNTER (OUTPATIENT)
Dept: WOMENS IMAGING | Age: 63
Discharge: HOME | End: 2023-04-27
Payer: COMMERCIAL

## 2023-04-25 ENCOUNTER — HOSPITAL ENCOUNTER (OUTPATIENT)
Dept: PHYSICAL THERAPY | Age: 63
Setting detail: THERAPIES SERIES
Discharge: HOME | End: 2023-04-25
Payer: COMMERCIAL

## 2023-04-25 DIAGNOSIS — Z12.31 BREAST CANCER SCREENING BY MAMMOGRAM: ICD-10-CM

## 2023-04-25 DIAGNOSIS — E11.9 TYPE 2 DIABETES MELLITUS WITHOUT COMPLICATION, WITHOUT LONG-TERM CURRENT USE OF INSULIN (HCC): ICD-10-CM

## 2023-04-25 PROCEDURE — 77063 BREAST TOMOSYNTHESIS BI: CPT

## 2023-04-25 PROCEDURE — 97113 AQUATIC THERAPY/EXERCISES: CPT

## 2023-04-25 RX ORDER — DULAGLUTIDE 1.5 MG/.5ML
INJECTION, SOLUTION SUBCUTANEOUS
Qty: 6 ML | Refills: 2 | Status: SHIPPED | OUTPATIENT
Start: 2023-04-25

## 2023-04-25 NOTE — FLOWSHEET NOTE
Laps 2 Laps   Squats  10x3\" 10x3\" 10x3\"   Step-Ups F/L Add - Low Fwd 10x Low Fwd 10x   Step Down F/L       Heel-toe raises 10x 10x 10x 10x   SLR F/L/R 10x 10x 10x 10x   Hip/Knee Flex/Ext 10x 10x 10x 10x   F/L Lunges                     UE Format: Chest Deep Chest Deep Chest Deep Chest Deep   Horiz Abd/Add 10x 10x 10x 10x   IR/ER (wipers) 10x 10x 10x 10x   Alt Flex/Ext 10x 10x 10x 10x   Alt Press Down 10x 10x 10x 10x   Abd/Add 10x 10x 10x 10x          Deep Water: 1 Noodle 1 Noodle 1 Noodle 1 Noodle   Hang 5' 5' 5' 5'   Cycling 2' 2' 2' 2'   Jacks 2' 2' 2' 2'   X-Country 2' 2' 2' 2'          Balance       SLS              Stretches       Achllies       Hamstring Pt defers - - 1st Step    1x 20\" each          Breast Stroke 2 Laps 1 Lap 2 Laps 2 Laps   Pain Rating 6-7 5-6 5 5       Specific Instructions for next treatment: Add lateral step ups as tolerated. Assessment: [x] Slowly Progressing toward goals. Completed program as noted above to tolerance. Patient noted decreased tightness after marching warm up laps. Able to resume hamstring stretch on 1st step this date with less reps- denies issues. Held adding lateral step ups this date due to patient complaints of increased pain after last session with addition of forward step ups. Patient denies increased symptoms post session. Finished with deep water aerobic activity to assist with endurance and decompression of spine/joints. Patient plans to start OT next visit    [] No change. [] Other:     [x] Skilled PT intervention is required to help alleviate pain, improve mobility, and maximize global strength/stability necessary to improve safe and efficient function through all ADLs and community activities at her prior level.       STG: (to be met in 8 treatments)  Pt will self report worst pain no greater than 3/10 in order to better tolerate ADLs/work activities with minimal dysfunction NO CHANGE; GOAL EXTENDED THROUGH POC 4/13  Pt will improve lumbar AROM to 90%

## 2023-04-25 NOTE — TELEPHONE ENCOUNTER
Patient called and medication did not go through to pharmacy. Can you please resend to pharmacy .  Thank you

## 2023-04-27 ENCOUNTER — HOSPITAL ENCOUNTER (OUTPATIENT)
Dept: OCCUPATIONAL THERAPY | Age: 63
Setting detail: THERAPIES SERIES
Discharge: HOME | End: 2023-04-27
Payer: COMMERCIAL

## 2023-04-27 ENCOUNTER — HOSPITAL ENCOUNTER (OUTPATIENT)
Dept: PHYSICAL THERAPY | Age: 63
Setting detail: THERAPIES SERIES
Discharge: HOME | End: 2023-04-27
Payer: COMMERCIAL

## 2023-04-27 PROCEDURE — 97166 OT EVAL MOD COMPLEX 45 MIN: CPT

## 2023-04-27 PROCEDURE — 97113 AQUATIC THERAPY/EXERCISES: CPT

## 2023-04-27 ASSESSMENT — PAIN DESCRIPTION - LOCATION: LOCATION: ELBOW;WRIST;HAND

## 2023-04-27 ASSESSMENT — 9 HOLE PEG TEST
TEST_RESULT: IMPAIRED
TESTTIME_SECONDS: 42
TEST_RESULT: IMPAIRED
TESTTIME_SECONDS: 33

## 2023-04-27 ASSESSMENT — PAIN SCALES - GENERAL: PAINLEVEL_OUTOF10: 1

## 2023-04-27 ASSESSMENT — PAIN DESCRIPTION - ORIENTATION: ORIENTATION: LEFT

## 2023-04-27 ASSESSMENT — PAIN DESCRIPTION - PAIN TYPE: TYPE: ACUTE PAIN

## 2023-04-27 ASSESSMENT — PAIN DESCRIPTION - FREQUENCY: FREQUENCY: INTERMITTENT

## 2023-04-27 ASSESSMENT — PAIN DESCRIPTION - DESCRIPTORS: DESCRIPTORS: DULL;ACHING;NUMBNESS

## 2023-04-27 NOTE — PROGRESS NOTES
capsule by mouth 2 times daily, Disp: , Rfl:     glipiZIDE (GLUCOTROL) 10 MG tablet, Take 1 tablet by mouth 2 times daily (before meals), Disp: , Rfl:     bimatoprost (LUMIGAN) 0.01 % SOLN ophthalmic drops, Place 1 drop into both eyes nightly, Disp: , Rfl:     omeprazole (PRILOSEC) 20 MG delayed release capsule, Take 1 capsule by mouth daily, Disp: , Rfl:     Empagliflozin-metFORMIN HCl (SYNJARDY) 5-1000 MG TABS, Take 1,000 mg by mouth 2 times daily (before meals), Disp: , Rfl:     albuterol sulfate HFA (PROVENTIL;VENTOLIN;PROAIR) 108 (90 Base) MCG/ACT inhaler, Inhale 2 puffs into the lungs every 6 hours as needed for Wheezing, Disp: , Rfl:     docusate sodium (COLACE) 100 MG capsule, Take 1 capsule by mouth 2 times daily as needed for Constipation, Disp: , Rfl:     cyclobenzaprine (FLEXERIL) 10 MG tablet, Take 1 tablet by mouth 3 times daily as needed for Muscle spasms, Disp: , Rfl:     celecoxib (CELEBREX) 100 MG capsule, TAKE 1 CAPSULE BY MOUTH 2 TIMES A DAY, Disp: 180 capsule, Rfl: 1    SYNJARDY 5-1000 MG TABS, TAKE 1 TABLET BY MOUTH 2 TIMES A DAY, Disp: 180 tablet, Rfl: 2    Elastic Bandages & Supports (ELBOW BRACE) MISC, 1 each by Does not apply route at bedtime Please dispense left cubital tunnel brace, Disp: 1 each, Rfl: 0    alendronate (FOSAMAX) 70 MG tablet, Take 1 tablet by mouth every 7 days (Patient taking differently: Take 1 tablet by mouth every 7 days Wednesdays), Disp: 12 tablet, Rfl: 1    oxyCODONE-acetaminophen (PERCOCET) 5-325 MG per tablet, Take by mouth every 4 hours as needed for Pain., Disp: , Rfl:     atorvastatin (LIPITOR) 40 MG tablet, Take 1 tablet by mouth at bedtime, Disp: 90 tablet, Rfl: 2    bimatoprost (LUMIGAN) 0.01 % SOLN ophthalmic drops, Place 1 drop into both eyes nightly, Disp: , Rfl:     Blood Glucose Monitoring Suppl (PRODIGY AUTOCODE BLOOD GLUCOSE) w/Device KIT, 1 Device by Does not apply route 3 times daily, Disp: 1 kit, Rfl: 0  Allergies: Codeine      SUBJECTIVE

## 2023-04-27 NOTE — FLOWSHEET NOTE
[] [facility] HOSPITAL & Therapy  [address]  Shayna Pump: [phone]   F: [phone]    Physical Therapy Daily Treatment Note      Date:  2023  Patient Name:  Kailash Gambino    :  1960  MRN: [id number]  Physician: Tayla Muller MD (resident physician)  Lety Ontiveros MD (attending physician)                                                Insurance: [address] [place] [address] Medicaid. Auth required after 30 visits. Medical Diagnosis: M54.42, G89.29 (ICD-10-CM) - Chronic left-sided low back pain with left-sided sciatica                          Rehab Codes: M54.42, M62.81, R26.9, R29.6  Onset Date: 2021 with multiple recent exacerbations                          Next 's appt: 23 with neurosurgery. 5/10/23 with neurology  Visit# / total visits:   Cancels/No Shows: 3/0    Subjective:  Pain was minimal this date- rated at about 4/10 upon arrival. States she had her OT evaluation this date and from trying to compensate with strength testing she aggravated her left piriformis- pain now at 6/10    Pain:  [x] Yes  [] No Location/Pain Rating: (0-10 scale) :  Lower back into left buttocks/lateral hip; down left lateral LE to foot ; R buttocks/piriformis region  6/10   Pain altered Tx:  [x] No  [] Yes  Action:     Comments:     Objective:  Modalities:   Precautions: FALL RISK  Exercises:      [facility] Exercise Log  Aquatic, Hip & DLS Program- Phase 1    Date of Eval:  3/8/23                              Primary PT: Lance Franklin, PT      Date 23    Visit # 10/16 11/16 12/16    Walk F/L/R 2 Laps 2 Laps 1 Lap    Marching 2 Laps 2 Laps 1 Lap    Squats 10x3\" 10x3\" 10x3\"    Step-Ups F/L Low Fwd 10x Low Fwd 10x Low 10x F  5x Lat    Step Down F/L       Heel-toe raises 10x 10x 10x    SLR F/L/R 10x 10x 10x    Hip/Knee Flex/Ext 10x 10x R 10x  L 7x    F/L Lunges                     UE Format: Chest

## 2023-05-02 ENCOUNTER — HOSPITAL ENCOUNTER (OUTPATIENT)
Dept: PHYSICAL THERAPY | Age: 63
Setting detail: THERAPIES SERIES
Discharge: HOME | End: 2023-05-02
Payer: COMMERCIAL

## 2023-05-02 ENCOUNTER — HOSPITAL ENCOUNTER (OUTPATIENT)
Dept: OCCUPATIONAL THERAPY | Age: 63
Setting detail: THERAPIES SERIES
Discharge: HOME | End: 2023-05-02
Payer: COMMERCIAL

## 2023-05-02 PROCEDURE — 97110 THERAPEUTIC EXERCISES: CPT

## 2023-05-02 PROCEDURE — 97113 AQUATIC THERAPY/EXERCISES: CPT

## 2023-05-02 PROCEDURE — 97140 MANUAL THERAPY 1/> REGIONS: CPT

## 2023-05-02 ASSESSMENT — PAIN SCALES - GENERAL: PAINLEVEL_OUTOF10: 2

## 2023-05-02 ASSESSMENT — PAIN DESCRIPTION - ORIENTATION: ORIENTATION: LEFT

## 2023-05-02 ASSESSMENT — PAIN DESCRIPTION - DESCRIPTORS: DESCRIPTORS: ACHING;DULL;NUMBNESS

## 2023-05-02 NOTE — PROGRESS NOTES
[address]         Phone: [phone]       Fax: [phone]     Occupational Therapy Daily Treatment note     Occupational Therapy: Daily Note   Patient: Lincoln Bledsoe (89 y.o. female)   Examination Date: 2023  No data recorded  Progress Note Counter: MD appt - May 9, 2023. :  1960 # of Visits since [facility]:   2   MRN: [id number]  CSN: [id number] Start of Care Date: 2023   Insurance: Payor: Jimmy Levy [place] / Plan: [employer] / Product Type: *No Product type* /   Insurance ID: [id number] - (Medicaid Managed) Secondary Insurance (if applicable): Insurance Information: [address]  30 visits   Referring Physician: Val Bosworth, MD Eduardo Portland APRN-CNP   PCP: Kathia Herman MD Visits to Date/Visits Approved:     No Show/Cancelled Appts:   /       Medical Diagnosis: S/P decompression of ulnar nerve at elbow [Z98.890]  Cervical myelopathy with cervical radiculopathy (HCC) [G95.9, M54.12] Z98.890 (ICD-10-CM) - S/P decompression of ulnar nerve at elbow  G95.9, M54.12 (ICD-10-CM) - Cervical myelopathy with cervical radiculopathy (HCC)  Treatment Diagnosis: lt UE/hand weakness, stiffness lt shoulder & wrist, impaired coordination, impaired sensation, pain (ICDD-10 codes :M62.81, M25.612, M25.632, R27.9, R20.2, M25.522, M79.632, M79.642,M25.532)        SUBJECTIVE EXAMINATION   Pain Level: Pain Screening  Patient Currently in Pain: Yes  Pain Assessment: 0-10  Pain Level: 2  Post Treatment Pain Level: 2  Pain Location: Arm, Elbow, Wrist, Hand (ulnar side lt UE)  Pain Orientation: Left  Pain Descriptors: Aching, Dull, Numbness    Patient Comments: Subjective: OT called pt because she wasn't at 2pm appt. Pt states she thought her appt was at 2:15 p.m & is on her way.  Pt states she disn't sleep last night & she fell asleep before coming to

## 2023-05-02 NOTE — FLOWSHEET NOTE
[] [facility] HOSPITAL & Therapy  [address]  [place]: [phone]   F: [phone]    Physical Therapy Daily Treatment Note      Date:  2023  Patient Name:  Hayde Jones    :  1960  MRN: [id number]  Physician: Zita Kumar MD (resident physician)  Yuri Contreras MD (attending physician)                                                Insurance: [address] [place] [address] Medicaid. Auth required after 30 visits. Medical Diagnosis: M54.42, G89.29 (ICD-10-CM) - Chronic left-sided low back pain with left-sided sciatica                          Rehab Codes: M54.42, M62.81, R26.9, R29.6  Onset Date: 2021 with multiple recent exacerbations                          Next 's appt: 23 with neurosurgery. 5/10/23 with neurology  Visit# / total visits:   Cancels/No Shows: 3/0    Subjective:  Did not sleep well last night- was late for OT today. States pain is tolerable. Over the weekend states she was aching all over entire body - plans to talk to her PCP this week when she sees her. Unsure if generalized pain was weather related or not. R piriformis not bad today. Pain:  [x] Yes  [] No Location/Pain Rating: (0-10 scale) :  Lower back into left buttocks/lateral hip; down left lateral LE to foot   5/10   Pain altered Tx:  [x] No  [] Yes  Action:     Comments:     Objective:  Modalities:   Precautions: FALL RISK  Exercises:      [facility] Exercise Log  Aquatic, Hip & DLS Program- Phase 1    Date of Eval:  3/8/23                              Primary PT: Ashley Rodriguez, PT      Date 23    Visit # 10/16 11/16 12/16 13/16    Walk F/L/R 2 Laps 2 Laps 1 Lap 2 Laps    Marching 2 Laps 2 Laps 1 Lap 2 Laps    Squats 10x3\" 10x3\" 10x3\" 12x3\"    Step-Ups F/L Low Fwd 10x Low Fwd 10x Low 10x F  5x Lat Low 12x    Step Down F/L        Heel-toe raises 10x 10x 10x 12x    SLR F/L/R 10x 10x 10x 12x

## 2023-05-04 ENCOUNTER — HOSPITAL ENCOUNTER (OUTPATIENT)
Dept: OCCUPATIONAL THERAPY | Age: 63
Setting detail: THERAPIES SERIES
Discharge: HOME | End: 2023-05-04
Payer: COMMERCIAL

## 2023-05-04 ENCOUNTER — HOSPITAL ENCOUNTER (OUTPATIENT)
Dept: PHYSICAL THERAPY | Age: 63
Setting detail: THERAPIES SERIES
Discharge: HOME | End: 2023-05-04
Payer: COMMERCIAL

## 2023-05-04 PROCEDURE — 97113 AQUATIC THERAPY/EXERCISES: CPT

## 2023-05-04 PROCEDURE — 97110 THERAPEUTIC EXERCISES: CPT

## 2023-05-04 PROCEDURE — 97140 MANUAL THERAPY 1/> REGIONS: CPT

## 2023-05-04 ASSESSMENT — PAIN DESCRIPTION - DESCRIPTORS: DESCRIPTORS: SORE;ACHING;NUMBNESS

## 2023-05-04 ASSESSMENT — PAIN DESCRIPTION - PAIN TYPE: TYPE: ACUTE PAIN

## 2023-05-04 ASSESSMENT — PAIN DESCRIPTION - LOCATION: LOCATION: WRIST;SHOULDER;ELBOW

## 2023-05-04 ASSESSMENT — PAIN DESCRIPTION - ORIENTATION: ORIENTATION: LEFT

## 2023-05-04 ASSESSMENT — PAIN SCALES - GENERAL: PAINLEVEL_OUTOF10: 1

## 2023-05-04 NOTE — PROGRESS NOTES
[address]         Phone: [phone]       Fax: [phone]     Occupational Therapy Daily Treatment note     Occupational Therapy: Daily Note   Patient: Janis Adhikari (10 y.o. female)   Examination Date: 2023  No data recorded  Progress Note Counter: MD appt - May 9, 2023. :  1960 # of Visits since [facility]:   3   MRN: [id number]  CSN: [id number] Start of Care Date: 2023   Insurance: Payor: Mona Scott [place] / Plan: Shirline Sings / Product Type: *No Product type* /   Insurance ID: [id number] - (Medicaid Managed) Secondary Insurance (if applicable): Insurance Information: [address]  30 visits   Referring Physician: MD Deyvi Hunter-CNP   PCP: Jered Chaney MD Visits to Date/Visits Approved: 3 / 20    No Show/Cancelled Appts:   /       Medical Diagnosis: S/P decompression of ulnar nerve at elbow [Z98.890]  Cervical myelopathy with cervical radiculopathy (HCC) [G95.9, M54.12] Z98.890 (ICD-10-CM) - S/P decompression of ulnar nerve at elbow  G95.9, M54.12 (ICD-10-CM) - Cervical myelopathy with cervical radiculopathy (HCC)  Treatment Diagnosis: lt UE/hand weakness, stiffness lt shoulder & wrist, impaired coordination, impaired sensation, pain (ICDD-10 codes :M62.81, M25.612, M25.632, R27.9, R20.2, M25.522, M79.632, M79.642,M25.532)        SUBJECTIVE EXAMINATION   Pain Level: Pain Screening  Patient Currently in Pain: Yes  Pain Assessment: 0-10  Pain Level: 1 (wrist)  Post Treatment Pain Level: 3 (pain 1-3)  Pain Type: Acute pain  Pain Location: Wrist, Shoulder, Elbow  Pain Orientation: Left  Pain Descriptors: Sore, Aching, Numbness    Patient Comments: Subjective: Pt reports  little lt wrist pain. Pt states she will go to concert this weekend.  OT asked pt if she is doing theraband exercises for her lt arm & pt reports she has

## 2023-05-04 NOTE — FLOWSHEET NOTE
[] [facility] HOSPITAL & Therapy  [address]  [place]: [phone]   F: [phone]    Physical Therapy Daily Treatment Note      Date:  2023  Patient Name:  April Donato    :  1960  MRN: [id number]  Physician: Christina Hutchins MD (resident physician)  Myrna Ac MD (attending physician)                                                Insurance: [address] [place] [address] Medicaid. Auth required after 30 visits. Medical Diagnosis: M54.42, G89.29 (ICD-10-CM) - Chronic left-sided low back pain with left-sided sciatica                          Rehab Codes: M54.42, M62.81, R26.9, R29.6  Onset Date: 2021 with multiple recent exacerbations                          Next 's appt: 2023 with neurosurgery. 5/10/23 with neurology  Visit# / total visits:   Cancels/No Shows: 3/    Subjective:   Reports no issues after last visit; notes increased UE soreness from OT. Pain:  [x] Yes  [] No Location/Pain Rating: (0-10 scale) :  Lower back into left buttocks/lateral hip; down left lateral LE to foot   4-5/10   Pain altered Tx:  [x] No  [] Yes  Action:     Comments:     Objective:  Modalities:   Precautions: FALL RISK  Exercises:      [address] [place] [address] Exercise Log  Aquatic, Hip & DLS Program- Phase 1    Date of Eval:  3/8/23                              Primary PT: Anny Kiran, PT      Date 23    Visit # 10/16 11/16 12/16 13/16 14/16    Walk F/L/R 2 Laps 2 Laps 1 Lap 2 Laps 2 Laps    Marching 2 Laps 2 Laps 1 Lap 2 Laps 2 Laps    Squats 10x3\" 10x3\" 10x3\" 12x3\" 12x3\"    Step-Ups F/L Low Fwd 10x Low Fwd 10x Low 10x F  5x Lat Low 12x Low 12x    Step Down F/L         Heel-toe raises 10x 10x 10x 12x 12x    SLR F/L/R 10x 10x 10x 12x 12x    Hip/Knee Flex/Ext 10x 10x R 10x  L 7x 12x 12x    F/L Lunges     Low Fwd 5x                      UE Format: Chest Deep Chest Deep Chest Deep Chest Deep Chest

## 2023-05-05 ENCOUNTER — HOSPITAL ENCOUNTER (OUTPATIENT)
Dept: CT IMAGING | Facility: CLINIC | Age: 63
End: 2023-05-05
Payer: COMMERCIAL

## 2023-05-05 DIAGNOSIS — M54.12 CERVICAL MYELOPATHY WITH CERVICAL RADICULOPATHY (HCC): ICD-10-CM

## 2023-05-05 DIAGNOSIS — M47.812 CERVICAL SPONDYLOSIS: ICD-10-CM

## 2023-05-05 DIAGNOSIS — G95.9 CERVICAL MYELOPATHY WITH CERVICAL RADICULOPATHY (HCC): ICD-10-CM

## 2023-05-05 DIAGNOSIS — E11.9 TYPE 2 DIABETES MELLITUS WITHOUT COMPLICATION, WITHOUT LONG-TERM CURRENT USE OF INSULIN (HCC): ICD-10-CM

## 2023-05-05 PROCEDURE — 72125 CT NECK SPINE W/O DYE: CPT

## 2023-05-05 RX ORDER — DULAGLUTIDE 1.5 MG/.5ML
INJECTION, SOLUTION SUBCUTANEOUS
Qty: 6 ML | Refills: 2 | Status: SHIPPED | OUTPATIENT
Start: 2023-05-05

## 2023-05-05 RX ORDER — EMPAGLIFLOZIN AND METFORMIN HYDROCHLORIDE 5; 1000 MG/1; MG/1
1 TABLET ORAL 2 TIMES DAILY
Qty: 180 TABLET | Refills: 2 | Status: SHIPPED | OUTPATIENT
Start: 2023-05-05

## 2023-05-05 RX ORDER — GLIPIZIDE 10 MG/1
10 TABLET ORAL
Qty: 120 TABLET | Refills: 3 | Status: SHIPPED | OUTPATIENT
Start: 2023-05-05

## 2023-05-05 NOTE — TELEPHONE ENCOUNTER
Patient contacting office stating pharmacy says they never received her script for [employer]. Please resend pending medication.

## 2023-05-05 NOTE — TELEPHONE ENCOUNTER
Last visit: 4/11/23  Last Med refill: 1/2023  Does patient have enough medication for 72 hours: No:     Next Visit Date:  Future Appointments   Date Time Provider Nicola Zaidi   5/5/2023  3:30  [facility] [address] [place]  [address]   5/9/2023 10:30 AM MD Paulina Garcia FP [id number]   5/9/2023  3:00 PM Nicolasa Porter, PTA [facility] [place]   5/9/2023  4:00 PM Diego Woodruff, [address] [place]   5/10/2023  3:00 PM Omkar Winslow MD Neuro [facility] -   5/11/2023  3:15 PM Poncho Damico, PT [facility] [place]   5/11/2023  4:00 PM Aretha Pickett, PTA [facility] [place]   5/11/2023  5:00 PM Dominique Acosta, OT [facility] [place]   7/14/2023  9:30 AM DO Artemio Ray Neuro [address] Maintenance   Topic Date Due    Shingles vaccine (1 of 2) Never done    Diabetic retinal exam  01/12/2017    Diabetic foot exam  12/01/2017    COVID-19 Vaccine (3 - Booster for Moderna series) 04/08/2021    Lipids  06/24/2022    Diabetic Alb to Cr ratio (uACR) test  09/22/2023    A1C test (Diabetic or Prediabetic)  04/11/2024    Depression Monitoring  04/11/2024    GFR test (Diabetes, CKD 3-4, OR last GFR 15-59)  04/11/2024    Breast cancer screen  04/25/2025    Colorectal Cancer Screen  11/22/2025    DTaP/Tdap/Td vaccine (4 - Td or Tdap) 08/25/2032    Flu vaccine  Completed    Pneumococcal 0-64 years Vaccine  Completed    Hepatitis C screen  Completed    HIV screen  Completed    Hepatitis A vaccine  Aged Out    Hib vaccine  Aged Out    Meningococcal (ACWY) vaccine  Aged Out    Depression Screen  Discontinued       Hemoglobin A1C (%)   Date Value   04/11/2023 7.6 (H)   11/08/2022 7.7   08/25/2022 6.9             ( goal A1C is < 7)   Microalb/Crt.  Ratio (mcg/mg creat)   Date Value   09/22/2022 Can not be calculated     LDL Cholesterol (mg/dL)   Date Value   06/24/2021 52   04/20/2021 48       (goal LDL is <100)   AST (U/L)   Date Value   10/04/2022 35 (H)     ALT (U/L)   Date

## 2023-05-09 ENCOUNTER — HOSPITAL ENCOUNTER (OUTPATIENT)
Dept: PHYSICAL THERAPY | Age: 63
Setting detail: THERAPIES SERIES
Discharge: HOME | End: 2023-05-09
Payer: COMMERCIAL

## 2023-05-09 ENCOUNTER — OFFICE VISIT (OUTPATIENT)
Dept: FAMILY MEDICINE CLINIC | Age: 63
End: 2023-05-09
Payer: COMMERCIAL

## 2023-05-09 ENCOUNTER — HOSPITAL ENCOUNTER (OUTPATIENT)
Dept: OCCUPATIONAL THERAPY | Age: 63
Setting detail: THERAPIES SERIES
Discharge: HOME | End: 2023-05-09
Payer: COMMERCIAL

## 2023-05-09 VITALS
HEART RATE: 82 BPM | SYSTOLIC BLOOD PRESSURE: 116 MMHG | WEIGHT: 199 LBS | HEIGHT: 62 IN | DIASTOLIC BLOOD PRESSURE: 68 MMHG | BODY MASS INDEX: 36.62 KG/M2

## 2023-05-09 DIAGNOSIS — E66.01 SEVERE OBESITY (BMI 35.0-39.9) WITH COMORBIDITY (HCC): ICD-10-CM

## 2023-05-09 DIAGNOSIS — F33.0 MILD EPISODE OF RECURRENT MAJOR DEPRESSIVE DISORDER (HCC): Primary | ICD-10-CM

## 2023-05-09 PROCEDURE — 99213 OFFICE O/P EST LOW 20 MIN: CPT | Performed by: FAMILY MEDICINE

## 2023-05-09 PROCEDURE — 97113 AQUATIC THERAPY/EXERCISES: CPT

## 2023-05-09 PROCEDURE — 97140 MANUAL THERAPY 1/> REGIONS: CPT

## 2023-05-09 PROCEDURE — 97110 THERAPEUTIC EXERCISES: CPT

## 2023-05-09 PROCEDURE — 3078F DIAST BP <80 MM HG: CPT | Performed by: FAMILY MEDICINE

## 2023-05-09 PROCEDURE — 3074F SYST BP LT 130 MM HG: CPT | Performed by: FAMILY MEDICINE

## 2023-05-09 RX ORDER — EMPAGLIFLOZIN AND METFORMIN HYDROCHLORIDE 5; 1000 MG/1; MG/1
1 TABLET ORAL 2 TIMES DAILY
Qty: 180 TABLET | Refills: 2 | Status: CANCELLED | OUTPATIENT
Start: 2023-05-09

## 2023-05-09 RX ORDER — BUSPIRONE HYDROCHLORIDE 10 MG/1
10 TABLET ORAL 3 TIMES DAILY
Qty: 90 TABLET | Refills: 3 | Status: SHIPPED | OUTPATIENT
Start: 2023-05-09 | End: 2023-06-08

## 2023-05-09 RX ORDER — GLIPIZIDE 10 MG/1
10 TABLET ORAL 2 TIMES DAILY
Qty: 180 TABLET | Refills: 3 | Status: SHIPPED | OUTPATIENT
Start: 2023-05-09

## 2023-05-09 RX ORDER — VENLAFAXINE HYDROCHLORIDE 37.5 MG/1
37.5 CAPSULE, EXTENDED RELEASE ORAL DAILY
Qty: 30 CAPSULE | Refills: 3 | Status: SHIPPED | OUTPATIENT
Start: 2023-05-09

## 2023-05-09 RX ORDER — SEMAGLUTIDE 1.34 MG/ML
0.2 INJECTION, SOLUTION SUBCUTANEOUS WEEKLY
Qty: 4 ADJUSTABLE DOSE PRE-FILLED PEN SYRINGE | Refills: 1 | Status: SHIPPED | OUTPATIENT
Start: 2023-05-09

## 2023-05-09 RX ORDER — EMPAGLIFLOZIN AND METFORMIN HYDROCHLORIDE 5; 1000 MG/1; MG/1
1 TABLET ORAL 2 TIMES DAILY
Qty: 180 TABLET | Refills: 2 | Status: SHIPPED | OUTPATIENT
Start: 2023-05-09

## 2023-05-09 ASSESSMENT — PATIENT HEALTH QUESTIONNAIRE - PHQ9
5. POOR APPETITE OR OVEREATING: 3
7. TROUBLE CONCENTRATING ON THINGS, SUCH AS READING THE NEWSPAPER OR WATCHING TELEVISION: 3
3. TROUBLE FALLING OR STAYING ASLEEP: 3
1. LITTLE INTEREST OR PLEASURE IN DOING THINGS: 1
4. FEELING TIRED OR HAVING LITTLE ENERGY: 3
2. FEELING DOWN, DEPRESSED OR HOPELESS: 1
SUM OF ALL RESPONSES TO PHQ QUESTIONS 1-9: 18
SUM OF ALL RESPONSES TO PHQ QUESTIONS 1-9: 18
9. THOUGHTS THAT YOU WOULD BE BETTER OFF DEAD, OR OF HURTING YOURSELF: 0
6. FEELING BAD ABOUT YOURSELF - OR THAT YOU ARE A FAILURE OR HAVE LET YOURSELF OR YOUR FAMILY DOWN: 1
10. IF YOU CHECKED OFF ANY PROBLEMS, HOW DIFFICULT HAVE THESE PROBLEMS MADE IT FOR YOU TO DO YOUR WORK, TAKE CARE OF THINGS AT HOME, OR GET ALONG WITH OTHER PEOPLE: 1
SUM OF ALL RESPONSES TO PHQ QUESTIONS 1-9: 18
SUM OF ALL RESPONSES TO PHQ9 QUESTIONS 1 & 2: 2
SUM OF ALL RESPONSES TO PHQ QUESTIONS 1-9: 18
8. MOVING OR SPEAKING SO SLOWLY THAT OTHER PEOPLE COULD HAVE NOTICED. OR THE OPPOSITE, BEING SO FIGETY OR RESTLESS THAT YOU HAVE BEEN MOVING AROUND A LOT MORE THAN USUAL: 3

## 2023-05-09 ASSESSMENT — PAIN DESCRIPTION - PAIN TYPE: TYPE: ACUTE PAIN

## 2023-05-09 ASSESSMENT — PAIN DESCRIPTION - ORIENTATION: ORIENTATION: LEFT

## 2023-05-09 ASSESSMENT — PAIN DESCRIPTION - FREQUENCY: FREQUENCY: INTERMITTENT

## 2023-05-09 ASSESSMENT — PAIN DESCRIPTION - DESCRIPTORS: DESCRIPTORS: ACHING;SORE;NUMBNESS

## 2023-05-09 ASSESSMENT — PAIN DESCRIPTION - LOCATION: LOCATION: HAND

## 2023-05-09 ASSESSMENT — PAIN SCALES - GENERAL: PAINLEVEL_OUTOF10: 2

## 2023-05-09 NOTE — PROGRESS NOTES
[address]         Phone: [phone]       Fax: [phone]     Occupational Therapy Daily Treatment note     Occupational Therapy: Daily Note   Patient: Tracy Sheehan (45 y.o. female)   Examination Date: 2023  No data recorded  Progress Note Counter: MD appt - May 9, 2023. :  1960 # of Visits since [facility]:   4   MRN: [id number]  CSN: [id number] Start of Care Date: 2023   Insurance: Payor: Barbara Rivas [place] / Plan: Allie Palacio / Product Type: *No Product type* /   Insurance ID: [id number] - (Medicaid Managed) Secondary Insurance (if applicable): Insurance Information: [address]  30 visits   Referring Physician: MD Nena Mcarthur APRN-CNP   PCP: Justin Camilo MD Visits to Date/Visits Approved:     No Show/Cancelled Appts:   /       Medical Diagnosis: S/P decompression of ulnar nerve at elbow [Z98.890]  Cervical myelopathy with cervical radiculopathy (HCC) [G95.9, M54.12] Z98.890 (ICD-10-CM) - S/P decompression of ulnar nerve at elbow  G95.9, M54.12 (ICD-10-CM) - Cervical myelopathy with cervical radiculopathy (HCC)  Treatment Diagnosis: lt UE/hand weakness, stiffness lt shoulder & wrist, impaired coordination, impaired sensation, pain (ICDD-10 codes :M62.81, M25.612, M25.632, R27.9, R20.2, M25.522, M79.632, M79.642,M25.532)        SUBJECTIVE EXAMINATION   Pain Level: Pain Screening  Patient Currently in Pain: Yes  Pain Assessment: 0-10  Pain Level: 2 (Pt states pain in her neck is \"8\", piriformis \"6\", & lt hand is \"2\")  Post Treatment Pain Level: 3  Pain Type: Acute pain  Pain Location: Hand  Pain Orientation: Left  Pain Descriptors: Aching, Sore, Numbness    Patient Comments: Subjective: Pt states that she  pain in her neck is \"8\", piriformis \"6\", & lt hand is \"2\". Pt states that over the weekend she stepped off curb &

## 2023-05-09 NOTE — PATIENT INSTRUCTIONS
Thank you for letting us take care of you today. We hope all your questions were addressed. If a question was overlooked or something else comes to mind after you return home, please contact a member of your Care Team listed below. Your Care Team at [place] [address] is Team #1  Johnnie Mcadams MD (Faculty)  Ildefonso Dent MD(Resident)  Luis Martino MD (Resident)  Ross Suggs DO (Resident)  Buddy Mesa, CHIVO De La Rosa., CHIVO Eli., SHERRY Barker., Rubye Carrel., [facility] office)  Eleanor John, [address] (Clinical Practice Manager)  Miguelina Christensen, [facility] (Clinical Pharmacist)     Office phone number: [phone]    If you need to get in right away due to illness, please be advised we have \"Same Day\" appointments available Monday-Friday. Please call us at [phone] option #3 to schedule your \"Same Day\" appointment.

## 2023-05-09 NOTE — FLOWSHEET NOTE
12x   Hip/Knee Flex/Ext 10x 10x R 10x  L 7x 12x 12x 12x   F/L Lunges     Low Fwd 5x Low fwd 5x                     UE Format: Chest Deep Chest Deep Chest Deep Chest Deep Chest Deep Count/Time   Horiz Abd/Add 10x 10x 10x 12x 12x 1' (16)   IR/ER (wipers) 10x 10x 10x 12x 12x 1'   Alt Flex/Ext 10x 10x 10x 12x 12x 1'(19)   Alt Press Down 10x 10x 10x 12x 12x 1'   Abd/Add 10x 10x 10x 12x 12x 1'(23)            Deep Water: 1 Noodle 1 Noodle 1 Noodle 1 Noodle 1 Noodle 1 Noodle   Hang 5' 5' 5' 5' 5' 5'   Cycling 2' 2' 2' 2' 2' 2'   Jacks 2' 2' 2' 2' 2' 2'   X-Country 2' 2' 2' 2' 2' 2'            Balance         SLS                  Stretches         Achllies         Hamstring - 1st Step    1x 20\" each 1st Step  1x20\" each 1st Step  2x20\" each 1st step  2x20\" 1st step  2x20\"            Breast Stroke 2 Laps 2 Laps 1 Lap 2 Laps 2 Laps 2 Laps   Pain Rating 5 5 6 5 4-5 4-5     Specific Instructions for next treatment:  Progress reps as patient is able. Assessment: [x] Slowly Progressing toward goals. Progressed UE format to time/count to encourage increased paces and resistance to challenge trunk/core stability. Notes more fatigue in UEs after progression but no extra pain. Continued with deep water exercises for LE endurance and core stability. Ended with deep water hang for pain relief. [] No change. [] Other:     [x] Skilled PT intervention is required to help alleviate pain, improve mobility, and maximize global strength/stability necessary to improve safe and efficient function through all ADLs and community activities at her prior level.       STG: (to be met in 8 treatments)  Pt will self report worst pain no greater than 3/10 in order to better tolerate ADLs/work activities with minimal dysfunction NO CHANGE; GOAL EXTENDED THROUGH POC 4/13  Pt will improve lumbar AROM to 90% or greater in all planes in order to demonstrate ability to move/reach in all planes unrestricted at [address] [place] [address]

## 2023-05-09 NOTE — PROGRESS NOTES
Subjective:       Mj Wiggins is a 59 y.o. female who presents for follow up of depression. Onset was approximately Several months ago. Symptoms have been unchanged since that time. Current symptoms include: depressed mood, fatigue, and hopelessness. Patient denies hypersomnia, psychomotor agitation, psychomotor retardation, suicidal attempt, suicidal thoughts with specific plan, and weight loss. Family history significant for no psychiatric illness. Possible organic causes contributing are: none. Risk factors: none. Previous treatment includes medication. She complains of the following side effects from the treatment: weight gain. Assessment:Diabetes Mellitus  Patient presents for follow up of diabetes. Current symptoms include: none. Symptoms have stabilized. Patient denies hypoglycemia , increase appetite, nausea, paresthesia of the feet, polydipsia, polyuria, visual disturbances, vomiting, and weight loss. Evaluation to date has included: fasting blood sugar, fasting lipid panel, hemoglobin A1C, and microalbuminuria. Home sugars: patient does not check sugars. Current treatment: none. Last dilated eye exam: due this year. Patient asking to be placed on Ozempiq.       Depression, unchanged     Patient Active Problem List    Diagnosis Date Noted    Cervical myelopathy with cervical radiculopathy ([facility] 01/20/2023    Lumbar spondylosis 01/20/2023    Right median nerve neuropathy 01/20/2023    S/P decompression of ulnar nerve at elbow 11/23/2022    Ulnar neuropathy at elbow, left 11/22/2022    Piriformis syndrome of left side 11/02/2022    Lumbar disc disease with radiculopathy 10/15/2022    Intractable back pain 10/15/2022    Left sciatic nerve pain 10/14/2022    Back pain with radiation 10/02/2022    Sciatica 09/30/2022    Intention tremor 09/22/2022    Retrolisthesis of vertebrae 08/01/2022    Bilateral carpal tunnel syndrome 08/01/2022    Leg edema 07/15/2022    Recurrent major depressive disorder ([address]

## 2023-05-09 NOTE — PROGRESS NOTES
Visit Information    Have you changed or started any medications since your last visit including any over-the-counter medicines, vitamins, or herbal medicines? no   Are you having any side effects from any of your medications? -  no  Have you stopped taking any of your medications? Is so, why? -  no    Have you seen any other physician or provider since your last visit? Yes - Records Obtained  Have you had any other diagnostic tests since your last visit? No  Have you been seen in the emergency room and/or had an admission to a hospital since we last saw you? No  Have you had your routine dental cleaning in the past 6 months? no    Have you activated your [employer] account? If not, what are your barriers?  Yes     Patient Care Team:  Anthony Verduzco MD as PCP - General (Family Medicine)  Anthony Verduzco MD as PCP - Empaneled Provider  Sarahi Lowery MD as Consulting Physician (Gastroenterology)    Medical History Review  Past Medical, Family, and Social History reviewed and does not contribute to the patient presenting condition    Health Maintenance   Topic Date Due    Shingles vaccine (1 of 2) Never done    Diabetic retinal exam  01/12/2017    Diabetic foot exam  12/01/2017    COVID-19 Vaccine (3 - Booster for Loras Salts series) 04/08/2021    Lipids  06/24/2022    Diabetic Alb to Cr ratio (uACR) test  09/22/2023    A1C test (Diabetic or Prediabetic)  04/11/2024    Depression Monitoring  04/11/2024    GFR test (Diabetes, CKD 3-4, OR last GFR 15-59)  04/11/2024    Breast cancer screen  04/25/2025    Colorectal Cancer Screen  11/22/2025    DTaP/Tdap/Td vaccine (4 - Td or Tdap) 08/25/2032    Flu vaccine  Completed    Pneumococcal 0-64 years Vaccine  Completed    Hepatitis C screen  Completed    HIV screen  Completed    Hepatitis A vaccine  Aged Out    Hib vaccine  Aged Out    Meningococcal (ACWY) vaccine  Aged Out    Depression Screen  Discontinued

## 2023-05-10 PROBLEM — E66.01 SEVERE OBESITY (BMI 35.0-39.9) WITH COMORBIDITY (HCC): Status: ACTIVE | Noted: 2023-05-10

## 2023-05-10 ASSESSMENT — ENCOUNTER SYMPTOMS
SHORTNESS OF BREATH: 0
DIARRHEA: 0
CONSTIPATION: 0
COUGH: 0

## 2023-05-11 ENCOUNTER — HOSPITAL ENCOUNTER (OUTPATIENT)
Dept: PHYSICAL THERAPY | Age: 63
Setting detail: THERAPIES SERIES
Discharge: HOME | End: 2023-05-11
Payer: COMMERCIAL

## 2023-05-11 ENCOUNTER — HOSPITAL ENCOUNTER (OUTPATIENT)
Dept: OCCUPATIONAL THERAPY | Age: 63
Setting detail: THERAPIES SERIES
Discharge: HOME | End: 2023-05-11
Payer: COMMERCIAL

## 2023-05-11 PROCEDURE — 97110 THERAPEUTIC EXERCISES: CPT

## 2023-05-11 PROCEDURE — 97140 MANUAL THERAPY 1/> REGIONS: CPT

## 2023-05-11 PROCEDURE — 97113 AQUATIC THERAPY/EXERCISES: CPT

## 2023-05-11 NOTE — FLOWSHEET NOTE
Training     [] Dry needling        [] 1 or 2 muscles        [] 3 or more muscles     []  Other     Total Treatment time 75 5     Time In: 258 PM            Time Out:  422 PM  PT Re-Evaluation Time: 3:15pm-3:40pm (25' billable time for skilled reassessment of all subjective sx, objectives, goals and progress to date for POC extension)    Treatment times reflect total treatment time combined by both PT and PTA for today's service. Physical therapy treatment completed today in part by physical therapist assistant (PTA).     Electronically signed by:  Jada Story PTA

## 2023-05-11 NOTE — FLOWSHEET NOTE
[] [place]       Occupational Therapy            1st floor       [address], [place]         Phone: [phone]       Fax: [phone]  [x] [facility] @ [facility]  [address] [facility] [address]  [place], [place]ly Schroeder [facility]  Phone [phone]  Fax [phone]     Occupational Therapy Daily Treatment Note    Date:  2023  Patient Name:  Margaret Mendieta    :  1960  MRN: [id number]  Physician: Fernanda Mehta MD     Insurance: Nae Moles   Diagnosis: S/P decompression of ulnar nerve at elbow [Z98.890], Cervical myelopathy with cervical radiculopathy ([facility] [G95.9, M54.12] Z98.890 (ICD-10-CM) - S/P decompression of ulnar nerve at elbow  G95.9, M54.12 (ICD-10-CM) - Cervical myelopathy with cervical radiculopathy ([facility]   Onset Date: 2022   Next Dr. Priya Hopson: 23  Visit# / total visits: ; Progress note for Medicare patient due at visit 8-9    Cancels/No Shows: 0/0      Subjective:    Pain:  [] Yes  [x] No Location:  N/A Pain Rating: (0-10 scale) 0/10  Pain altered Tx:  [x] No  [] Yes  Action:  Pt Comments: Pt reports being tired after aquatic therapy. Pt reports having L wrist pn this morning but it is better now.     Objective:  Modalities:  Precautions:  Exercises:  EXERCISE    REPS/     TIME  WEIGHT/    LEVEL COMMENTS Completed    Ulnar nerve glide 15 AROM  X   Gripping 3x10 yellow digiflex X   Putty pressing  24 Green  X   UE cane exercises  3x10 1lbs ( shoulder circles cw & ccw,elbow flex/extension ) X   Flexbar     Wrist Flexion/Ext  Wrist pronation  Wrist supination  Wrist ulnar deviation  Wrist radial dev X   Resistive clips 6 each color  Yellow-green 2 blue clips X   Foam pegs Whole board    X   Other:      Treatment Charges: Mins Units   []  Modalities:      []  Ultrasound     [x]  Ther Exercise 23 1   [x]  Manual Therapy 10 1   []  Ther Activities     []  Orthotic fit/train     []  Orthotic recheck     []  Other

## 2023-05-11 NOTE — PROGRESS NOTES
GOAL PROGRESSING AND EXTENDED THROUGH NEW POC 5/11  Pt will improve trunk strength to 4/5 or greater in all planes to show improved stability at prior level GOAL PROGRESSING AND EXTENDED THROUGH NEW POC 5/11  Pt will improve FTSTS to <13 seconds with no UE support to show improved safety/efficiency and strength with functional transfers GOAL MET 5/11  LTG: (to be met in 24 treatments)  Pt will demonstrate improved B LE strength to 4/5 or greater in order to demonstrate improved stability/strength necessary for unrestricted ADLs/work activities Mony Merrill [address] POC 5/11  Pt will improve TUG to <12 seconds with no AD and no unsteadiness to demonstrate improved safety/efficiency with household mobility and minimal risk for falls GOAL PROGRESSING AND EXTENDED THROUGH NEW POC 5/11  Pt will improve Tinetti score to 24/28 or greater to show improved balance and gait stability with minimal risk for falls GOAL PROGRESSING AND EXTENDED THROUGH NEW POC 5/11  Pt will decrease SHEY to 10% impaired or less in order to demonstrate improved functional tolerances at PLOF with minimal restriction/dysfunction GOAL EXTENDED THROUGH NEW POC 5/11  Pt will demonstrate independence with a long term HEP for continued progress/maintenance after completion of PT GOAL PROGRESSING AND EXTENDED THROUGH NEW POC 5/11    Treatment to Date:  [] Therapeutic Exercise   01077  [] Iontophoresis: 4 mg/mL Dexamethasone Sodium Phosphate  mAmin  98437   [] Therapeutic Activity  23944 [] Vasopneumatic cold with compression  89344    [] Gait Training   62433 [] Ultrasound   39069   [] Neuromuscular Re-education  08186 [] Electrical Stimulation Unattended  17392   [] Manual Therapy  34912 [] Electrical Stimulation Attended  47061   [x] Instruction in HEP  [] Lumbar/Cervical Traction  [id number]   [x] Aquatic Therapy   09038 [] Cold/hotpack    [] Massage   63587      [] Dry Needling, 1 or 2 muscles  81449   [] Biofeedback, first 15

## 2023-05-16 ENCOUNTER — HOSPITAL ENCOUNTER (OUTPATIENT)
Dept: PHYSICAL THERAPY | Age: 63
Setting detail: THERAPIES SERIES
Discharge: HOME | End: 2023-05-16
Payer: COMMERCIAL

## 2023-05-16 ENCOUNTER — HOSPITAL ENCOUNTER (OUTPATIENT)
Dept: OCCUPATIONAL THERAPY | Age: 63
Setting detail: THERAPIES SERIES
Discharge: HOME | End: 2023-05-16
Payer: COMMERCIAL

## 2023-05-16 PROCEDURE — 97113 AQUATIC THERAPY/EXERCISES: CPT

## 2023-05-16 PROCEDURE — 97140 MANUAL THERAPY 1/> REGIONS: CPT

## 2023-05-16 PROCEDURE — 97110 THERAPEUTIC EXERCISES: CPT

## 2023-05-16 ASSESSMENT — PAIN DESCRIPTION - ORIENTATION: ORIENTATION: LEFT

## 2023-05-16 ASSESSMENT — PAIN SCALES - GENERAL: PAINLEVEL_OUTOF10: 1

## 2023-05-16 ASSESSMENT — PAIN DESCRIPTION - PAIN TYPE: TYPE: ACUTE PAIN

## 2023-05-16 ASSESSMENT — PAIN DESCRIPTION - DESCRIPTORS: DESCRIPTORS: SORE;NUMBNESS

## 2023-05-16 ASSESSMENT — PAIN DESCRIPTION - LOCATION: LOCATION: HAND

## 2023-05-16 NOTE — PROGRESS NOTES
[address]         Phone: [phone]       Fax: [phone]     Occupational Therapy Daily Treatment note     Occupational Therapy: Daily Note   Patient: Dmitry Smith (65 y.o. female)   Examination Date: 2023  No data recorded  Progress Note Counter: MD appt - May 9, 2023. :  1960 # of Visits since [facility]:   6   MRN: [id number]  CSN: [id number] Start of Care Date: 2023   Insurance: Payor: Tanya Linares [place] / Plan: Shiv Courtney / Product Type: *No Product type* /   Insurance ID: [id number] - (Medicaid Managed) Secondary Insurance (if applicable): Insurance Information: [employer]  30 visits   Referring Physician: Kris Whitlock MD     PCP: Manny Ibarra MD Visits to Date/Visits Approved:     No Show/Cancelled Appts:   /       Medical Diagnosis: S/P decompression of ulnar nerve at elbow [Z98.890]  Cervical myelopathy with cervical radiculopathy (HCC) [G95.9, M54.12] Z98.890 (ICD-10-CM) - S/P decompression of ulnar nerve at elbow  G95.9, M54.12 (ICD-10-CM) - Cervical myelopathy with cervical radiculopathy (HCC)  Treatment Diagnosis: lt UE/hand weakness, stiffness lt shoulder & wrist, impaired coordination, impaired sensation, pain (ICDD-10 codes :M62.81, M25.612, M25.632, R27.9, R20.2, M25.522, M79.632, M79.642,M25.532)        SUBJECTIVE EXAMINATION   Pain Level: Pain Screening  Patient Currently in Pain: No  Pain Assessment: 0-10  Pain Level: 1  Post Treatment Pain Level: 1  Pain Type: Acute pain  Pain Location: Hand  Pain Orientation: Left  Pain Descriptors: Sore, Numbness    Patient Comments: Subjective: Pt reports lt UE arm pain has decreased. Pt reports eating at [employer] for Mother's day. Pt reports she is doing HEP for rubbing lt hand with washcloth at home for desensitization.     HEP Compliance: Good - per pt report

## 2023-05-16 NOTE — FLOWSHEET NOTE
[x] [facility] HOSPITAL & Therapy  [address]  [place]: [phone]   F: [phone]    Physical Therapy Daily Treatment Note    Date:  2023  Patient Name:  Gadiel Pathak    :  1960  MRN: [id number]  Physician: Vera Davidson MD (resident physician)  Re Jeronimo MD (attending physician)                                                Insurance: [address] [place] [address] Medicaid. Auth required after 30 visits. Medical Diagnosis: M54.42, G89.29 (ICD-10-CM) - Chronic left-sided low back pain with left-sided sciatica                          Rehab Codes: M54.42, M62.81, R26.9, R29.6  Onset Date: 2021 with multiple recent exacerbations                          Next 's appt: 6/15/23 with neurosurgery. 23 with neurology  Visit# / total visits:     Cancels/No Shows: 3/0    Subjective:   Reports she is feeling better overall. Not too bad today    Pain:  [x] Yes  [] No Location/Pain Rating: (0-10 scale) :  Lower back into left buttocks/lateral hip to knee with pain; numbness down left lateral LE to foot,  4/10 R piriformis/lateral thigh 4/10  Pain altered Tx:  [x] No  [] Yes  Action:     Comments:     Objective:  Modalities:   Precautions: FALL RISK  Exercises:    [facility] Exercise Log  Aquatic, Hip & DLS Program- Phase 1    Date of Eval:  3/8/23                              Primary PT: Shawn Moncada, PT      Date 23    Visit #     Walk F/L/R 2 Laps 2 Laps    Marching 2 Laps 2 Laps          Squats 15x3\" 15x3\"    Step-Ups F/L Low 15x Low 15x Tall   Step Down F/L      Heel-toe raises 15x 15x    SLR F/L/R 15x SteamBoats 15x    Hip/Knee Flex/Ext 15x 15x    F/L Lunges Low Fwd 10x Low Fwd L 15x   R 12x    Figure 8s   Add         UE Format: Count/Time Count/time    Horiz Abd/Add 1' (20 ) 1' (20)    IR/ER (wipers) 1' 1'    Alt Flex/Ext 1' ( 22) 1' (24)    Alt Press Down 1' 1'    Abd/Add 1'

## 2023-05-17 ENCOUNTER — TELEPHONE (OUTPATIENT)
Dept: FAMILY MEDICINE CLINIC | Age: 63
End: 2023-05-17

## 2023-05-17 DIAGNOSIS — I10 PRIMARY HYPERTENSION: ICD-10-CM

## 2023-05-17 DIAGNOSIS — E11.9 TYPE 2 DIABETES MELLITUS WITHOUT COMPLICATION, WITHOUT LONG-TERM CURRENT USE OF INSULIN (HCC): ICD-10-CM

## 2023-05-17 DIAGNOSIS — G56.01 CARPAL TUNNEL SYNDROME OF RIGHT WRIST: ICD-10-CM

## 2023-05-17 RX ORDER — ATORVASTATIN CALCIUM 40 MG/1
40 TABLET, FILM COATED ORAL NIGHTLY
Qty: 90 TABLET | Refills: 2 | Status: SHIPPED | OUTPATIENT
Start: 2023-05-17

## 2023-05-17 RX ORDER — LISINOPRIL 40 MG/1
40 TABLET ORAL DAILY
Qty: 30 TABLET | Refills: 2 | Status: SHIPPED | OUTPATIENT
Start: 2023-05-17

## 2023-05-17 NOTE — TELEPHONE ENCOUNTER
Pt is requesting refills for :  Atorvastatin  Lisinopril 40 mg    [facility] in [place] ( [address])

## 2023-05-18 ENCOUNTER — HOSPITAL ENCOUNTER (OUTPATIENT)
Dept: PHYSICAL THERAPY | Age: 63
Setting detail: THERAPIES SERIES
Discharge: HOME | End: 2023-05-18
Payer: COMMERCIAL

## 2023-05-18 ENCOUNTER — HOSPITAL ENCOUNTER (OUTPATIENT)
Dept: OCCUPATIONAL THERAPY | Age: 63
Setting detail: THERAPIES SERIES
Discharge: HOME | End: 2023-05-18
Payer: COMMERCIAL

## 2023-05-18 PROCEDURE — 97110 THERAPEUTIC EXERCISES: CPT

## 2023-05-18 PROCEDURE — 97113 AQUATIC THERAPY/EXERCISES: CPT

## 2023-05-18 PROCEDURE — 97140 MANUAL THERAPY 1/> REGIONS: CPT

## 2023-05-18 RX ORDER — GABAPENTIN 300 MG/1
600 CAPSULE ORAL 3 TIMES DAILY
Qty: 180 CAPSULE | Refills: 0 | Status: SHIPPED | OUTPATIENT
Start: 2023-05-18 | End: 2023-06-17

## 2023-05-18 NOTE — FLOWSHEET NOTE
Assessment: [x] Progressing toward goals. Began session w/ soft tissue mobilization to the L forearm on the ulnar aspect of the wrist. Cont  and forearm strengthening. Pt tolerated tx well on this date. [] No change. [] Other     [x] Patient would continue to benefit from skilled occupational therapy services in order to: Occupational Therapy is both indicated and medically necessary as outlined in the POC to increase the likelihood of meeting the functionally related goals stated below. STG/LTG  Short Term Goals  (10 visits)  Pt will demo & report modified independence with lt UE HEP   Increase lt shoulder AROM (flexion & abduction) by 10 to improve reaching into cabinets & reaching for ADL. Increase AROM lt wrist (flexion, extension,UD) by 5 to improve wrist motion for meal prep/stabilizing food, opening jars/bottles. Pt will demo improved lt hand dexterity by completing 9 hole peg test 10 seconds quicker than eval. Pt will report less difficulty with fine motor ADLs buttoning buttons. Increase lt hand strength :  by 10# & pinch (lateral,abdalla, tip) by 2# to improve strength for opening jars/bottles, washing dishes. Using the UEFI pt will report an increase in total score by 10 points indicating that she is having less difficulty & more independence using lt UE for ADL/IADLS. Long Term Goals  (20 visits)  Pt will report & demo independence with lt UE HEP   Compared to eval : Increase lt shoulder AROM (flexion & abduction) by 20 to improve reaching into cabinets & reaching for ADL. Increase lt UE strength (shoulder, elbow, forearm, wrist, hand) to 4/5 so that pt will report less difficulty lifting groceries, doing housework, pushing up to get out of chair, opening door/jars. Compared to eval: Pt will demo improved lt hand dexterity by completing 9 hole peg test 15 seconds quicker than eval. Pt will report less difficulty with fine motor ADLs buttoning buttons.    Compared

## 2023-05-18 NOTE — FLOWSHEET NOTE
[x] [facility] HOSPITAL & Therapy  [address]  [place]: [phone]   F: [phone]    Physical Therapy Daily Treatment Note    Date:  2023  Patient Name:  Siri Valles    :  1960  MRN: [id number]  Physician: Alex Aldana MD (resident physician)  Patti Camacho MD (attending physician)                                                Insurance: [address] [place] [address] Medicaid. Auth required after 30 visits. Medical Diagnosis: M54.42, G89.29 (ICD-10-CM) - Chronic left-sided low back pain with left-sided sciatica                          Rehab Codes: M54.42, M62.81, R26.9, R29.6  Onset Date: 2021 with multiple recent exacerbations                          Next 's appt: 6/15/23 with neurosurgery. 23 with neurology  Visit# / total visits:     Cancels/No Shows: 3/0    Subjective:   States pain is minimal today- overall feels she has been doing better. Still using SPC for stability/balance. Has a busy weekend coming up with activity    Pain:  [x] Yes  [] No Location/Pain Rating: (0-10 scale) :  Lower back into left buttocks/lateral hip to knee with pain; numbness down left lateral LE to foot,  3/10 R ; piriformis/lateral thigh 5/10  Pain altered Tx:  [x] No  [] Yes  Action:     Comments:     Objective:  Modalities:   Precautions: FALL RISK  Exercises:    [address] [facility] Exercise Log  Aquatic, Hip & DLS Program- Phase 1    Date of Eval:  3/8/23                              Primary PT: Merlyn Mcginnis, PT      Date 23    Visit #     Walk F/L/R 2 Laps 2 Laps 2 Laps    Marching 2 Laps 2 Laps 2 Laps           Squats 15x3\" 15x3\" 15x3\"    Step-Ups F/L Low 15x Low 15x Low 15x    Step Down F/L       Heel-toe raises 15x 15x 15x    SLR F/L/R 15x SteamBoats 15x Steamboats  15x    Hip/Knee Flex/Ext 15x 15x 15x    F/L Lunges Low Fwd 10x Low Fwd L 15x   R 12x Low Fwd 15x    Figure 8s

## 2023-05-22 ENCOUNTER — OFFICE VISIT (OUTPATIENT)
Dept: NEUROLOGY | Age: 63
End: 2023-05-22

## 2023-05-22 VITALS
DIASTOLIC BLOOD PRESSURE: 84 MMHG | WEIGHT: 199 LBS | RESPIRATION RATE: 16 BRPM | SYSTOLIC BLOOD PRESSURE: 129 MMHG | HEIGHT: 62 IN | BODY MASS INDEX: 36.62 KG/M2 | OXYGEN SATURATION: 96 % | HEART RATE: 91 BPM

## 2023-05-22 DIAGNOSIS — G56.22 ULNAR NEUROPATHY AT ELBOW, LEFT: Primary | ICD-10-CM

## 2023-05-22 DIAGNOSIS — G57.03 BILATERAL PIRIFORMIS SYNDROME: ICD-10-CM

## 2023-05-22 DIAGNOSIS — M54.12 CERVICAL MYELOPATHY WITH CERVICAL RADICULOPATHY (HCC): ICD-10-CM

## 2023-05-22 DIAGNOSIS — G95.9 CERVICAL MYELOPATHY WITH CERVICAL RADICULOPATHY (HCC): ICD-10-CM

## 2023-05-22 NOTE — PROGRESS NOTES
[address] [place] [address]  [address] [id number]  Dept: [phone]  Dept Fax: [phone]    NEUROLOGY CLINIC FOLLOW UP NOTE       PATIENT NAME: Jamarcus Naylor  PATIENT MRN: [id number]  PRIMARY CARE PHYSICIAN: Radha Mcdonald MD    Interval Hx:    5/22/2023:  Patient here in follow up. Had her MRI Brain and Cervical Spine done in the interval.  Still continues to have left and right hand paresthesia. Does endorse weakness  Is currently working with PT/OT  Does have neurosurgery follow up. HPI:      Low back pain and left hip pain secondary to left piriformis syndrome. Jamarcus Naylor is a 65years old female patient. She follows up with neurology as a case of left hip and low back pain for at least a year, was referred by family medicine in 4/6/22 which was her first visit to our neurology service. She had a hospital encounter in October 16 2022; she presented with constant left buttock pain for pain control and evaluation. She had MRI pelvis WWO; slight asymmetric atrophy of the left-sided piriformis muscle which can be seen with left-sided piriformis syndrome. Mild to moderate nonspecific edema in the right gluteus julissa muscle with no significant muscle atrophy. Mild nonspecific edema in the posterior left obturator internus muscle and mild nonspecific edema in the subcutaneous fat at the lateral aspect of the right hip/thigh; she was treated with steroids epidural injection IR guided in addition to percocet and analgesics. She has left cubital tunnel syndrome with surgery done in 11/23/2022 with Dr. Coleman Abernathy and Dr. Marleni Castellon has done 3 injection in the piriformis area (pain management). She still has pain and is using a cane for ambulation. She ran out of her insurance and was not able to follow with pain management but she said she is going to see another specialist at [facility]. Bilateral wrist tremors, R>L, case of essential tremors.   This was

## 2023-05-23 ENCOUNTER — HOSPITAL ENCOUNTER (OUTPATIENT)
Dept: OCCUPATIONAL THERAPY | Age: 63
Setting detail: THERAPIES SERIES
Discharge: HOME | End: 2023-05-23
Payer: COMMERCIAL

## 2023-05-23 ENCOUNTER — HOSPITAL ENCOUNTER (OUTPATIENT)
Dept: PHYSICAL THERAPY | Age: 63
Setting detail: THERAPIES SERIES
Discharge: HOME | End: 2023-05-23
Payer: COMMERCIAL

## 2023-05-23 NOTE — PROGRESS NOTES
[address]   OCCUPATIONAL THERAPY MISSED TREATMENT NOTE   OUTPATIENT   Date: 23  Patient Name: Raúl Carvajal      MRN: [id number]   Account #: [de-identified]    : 1960  (59 y.o.)  Gender: female   Diagnosis: Z98.890 (ICD-10-CM) - S/P decompression of ulnar nerve at elbow  G95.9, M54.12 (ICD-10-CM) - Cervical myelopathy with cervical radiculopathy ([facility]  OT Visit Information  OT Insurance Information: [employer]  30 visits  Total # of Visits Approved: 20  Canceled Appointment: 1      REASON FOR MISSED TREATMENT:     -        Patient cancelled due to transportation conflict . Pt cancel because her ride can't bring her.        Electronically signed by Shobha Carbajal OT on 23 at 1:01 PM EDT

## 2023-05-23 NOTE — FLOWSHEET NOTE
[] [facility]) - [facility] & Therapy  [address]  [place]: [phone]   F: [phone]     Physical Therapy Cancel/No Show note    Date: 2023  Patient: Rosalba Rausch  : 1960  MRN: [id number]    Visit Count:   Cancels/No Shows to date:     For today's appointment patient:    [x]  Cancelled    [] Rescheduled appointment    [] No-show     Reason given by patient:    []  Patient ill    []  Conflicting appointment    [x] No transportation      [] Conflict with work    [] No reason given    [] Weather related    [] COVID-19    [] Other:      Comments:        [x] Next appointment was confirmed    Electronically signed by: Steven Lancaster PTA

## 2023-05-25 ENCOUNTER — HOSPITAL ENCOUNTER (OUTPATIENT)
Dept: PHYSICAL THERAPY | Age: 63
Setting detail: THERAPIES SERIES
Discharge: HOME | End: 2023-05-25
Payer: COMMERCIAL

## 2023-05-25 ENCOUNTER — HOSPITAL ENCOUNTER (OUTPATIENT)
Dept: OCCUPATIONAL THERAPY | Age: 63
Setting detail: THERAPIES SERIES
Discharge: HOME | End: 2023-05-25
Payer: COMMERCIAL

## 2023-05-25 PROCEDURE — 97113 AQUATIC THERAPY/EXERCISES: CPT

## 2023-05-25 PROCEDURE — 97110 THERAPEUTIC EXERCISES: CPT

## 2023-05-25 PROCEDURE — 97140 MANUAL THERAPY 1/> REGIONS: CPT

## 2023-05-25 NOTE — FLOWSHEET NOTE
[x] [facility] HOSPITAL & Therapy  [address]  [place]: [phone]   F: [phone]    Physical Therapy Daily Treatment Note    Date:  2023  Patient Name:  Kelechi Camara    :  1960  MRN: [id number]  Physician: Fahad Reyes MD (resident physician)  Carmela De La Rosa MD (attending physician)                                                Insurance: [address] [place] [address] Medicaid. Auth required after 30 visits. Medical Diagnosis: M54.42, G89.29 (ICD-10-CM) - Chronic left-sided low back pain with left-sided sciatica                          Rehab Codes: M54.42, M62.81, R26.9, R29.6  Onset Date: 2021 with multiple recent exacerbations                          Next 's appt: 6/15/23 with neurosurgery. 23 with neurology  Visit# / total visits:     Cancels/No Shows: 4/0    Subjective:   Reports last  dosage of Requip was lowered and she has noted increased symptoms with her restless leg syndrome- has not slept the last 3 nights. Denies increased pain this date- feeling pretty good overall this date. States she continues with L LE symptoms however they are less intense. May end up out of town next week- will call to cancel if so. Pain:  [x] Yes  [] No Location/Pain Rating: (0-10 scale) :  Lower back into left buttocks/lateral hip to knee with pain; numbness down left lateral LE to foot,  4/10 R ; piriformis/lateral thigh 4/10  Pain altered Tx:  [x] No  [] Yes  Action:     Comments:     Objective:  Modalities:   Precautions: FALL RISK  Exercises:    [address] [facility] Exercise Log  Aquatic, Hip & DLS Program- Phase 1    Date of Eval:  3/8/23                              Primary PT: Sandra Snider, PT      Date 23    Visit #     Walk F/L/R 2 Laps 2 Laps 2 Laps 2 Laps    Marching 2 Laps 2 Laps 2 Laps 2 Laps            Squats 15x3\" 15x3\" 15x3\" 15x3\"    Step-Ups

## 2023-05-25 NOTE — FLOWSHEET NOTE
[] [place]       Occupational Therapy            1st floor       [address], [place]         Phone: [phone]       Fax: [phone]  [x] [facility] @ [facility]  [address]  [place] [address] [address]  Phone [phone]  Fax [phone]     Occupational Therapy Daily Treatment Note    Date:  2023  Patient Name:  Lisa Weinberg    :  1960  MRN: [id number]  Physician: Mitali Elias MD     Insurance: Leon Aguirre   Diagnosis: S/P decompression of ulnar nerve at elbow [Z98.890], Cervical myelopathy with cervical radiculopathy ([address] [G95.9, M54.12] Z98.890 (ICD-10-CM) - S/P decompression of ulnar nerve at elbow  G95.9, M54.12 (ICD-10-CM) - Cervical myelopathy with cervical radiculopathy ([address]   Onset Date: 2022   Next  [address]: 23  Visit# / total visits: ; Progress note for Medicare patient due at visit 8-9    Cancels/No Shows: 0/0      Subjective:    Pain:  [x] Yes  [] No Location:  N/A Pain Rating: (0-10 scale) 2/10  Pain altered Tx:  [x] No  [] Yes  Action:  Pt Comments: Pt reports she is still having numbness in L hand.       Objective:  Modalities:  Precautions:  Exercises:  EXERCISE    REPS/     TIME  WEIGHT/    LEVEL COMMENTS Completed    Ulnar nerve glide 15 AROM Prayer glide X   Gripping 3x10 25lbs digiflex X   Putty pressing  24 Green  X   UE cane exercises  3x10 1lbs (shoulder circles cw & ccw,elbow flex/extension ) X   Flexbar   3x10 Red Wrist Flexion/Ext  Wrist pronation  Wrist supination  Wrist ulnar deviation  Wrist radial dev X   Resistive clips 6 each color  Yellow-black  X   Foam pegs Whole board    -   Other:      Treatment Charges: Mins Units   []  Modalities:      []  Ultrasound     [x]  Ther Exercise 33 2   [x]  Manual Therapy 10 1   []  Ther Activities     []  Orthotic fit/train     []  Orthotic recheck     []  Other     Total Treatment time  43 mins

## 2023-05-30 ENCOUNTER — APPOINTMENT (OUTPATIENT)
Dept: PHYSICAL THERAPY | Age: 63
End: 2023-05-30
Payer: COMMERCIAL

## 2023-05-30 PROCEDURE — 97113 AQUATIC THERAPY/EXERCISES: CPT

## 2023-05-30 NOTE — FLOWSHEET NOTE
[x] [facility]OSP[facility] & Therapy  [address] [facility] [address]  [place]: [phone]   F: [phone]    Physical Therapy Daily Treatment Note    Date:  2023  Patient Name:  Mariama Joe    :  1960  MRN: [id number]  Physician: Perry Clayton MD (resident physician)  He Hurtado MD (attending physician)                                                Insurance: [address] [place] [address] Medicaid. Auth required after 30 visits. Medical Diagnosis: M54.42, G89.29 (ICD-10-CM) - Chronic left-sided low back pain with left-sided sciatica                          Rehab Codes: M54.42, M62.81, R26.9, R29.6  Onset Date: 2021 with multiple recent exacerbations                          Next 's appt: 6/15/23 with neurosurgery. 23 with neurology  Visit# / total visits:     Cancels/No Shows: 4/0    Subjective:   : Patient arrived and reports her overall pain levels remain the same. Pain:  [x] Yes  [] No Location/Pain Rating: (0-10 scale) :  Lower back into left buttocks/lateral hip to knee with pain; numbness down left lateral LE to foot,  4/10 R ; piriformis/lateral thigh 4/10  Pain altered Tx:  [x] No  [] Yes  Action:     Comments:     Objective:  Modalities:   Precautions: FALL RISK  Exercises:    [address] [place] [address] Exercise Log  Aquatic, Hip & DLS Program- Phase 1    Date of Eval:  3/8/23                              Primary PT: Suleman Dent, DYANA      Date 23   Visit #    Walk F/L/R 2 Laps 2 Laps 2 Laps 2 Laps 2 Laps   Marching 2 Laps 2 Laps 2 Laps 2 Laps 2 Laps           Squats 15x3\" 15x3\" 15x3\" 15x3\" 15x3\"   Step-Ups F/L Low 15x Low 15x Low 15x Low 15x 15x Tall   Step Down F/L        Heel-toe raises 15x 15x 15x 15x 15x   SLR F/L/R 15x SteamBoats 15x Steamboats  15x Steamboats  15x Steamboats  15x   Hip/Knee Flex/Ext 15x 15x 15x 15x 15x   F/L Lunges Low Fwd 10x

## 2023-05-31 ENCOUNTER — APPOINTMENT (OUTPATIENT)
Dept: OCCUPATIONAL THERAPY | Age: 63
End: 2023-05-31
Payer: COMMERCIAL

## 2023-05-31 PROCEDURE — 97140 MANUAL THERAPY 1/> REGIONS: CPT

## 2023-05-31 PROCEDURE — 97110 THERAPEUTIC EXERCISES: CPT

## 2023-05-31 ASSESSMENT — PAIN SCALES - GENERAL: PAINLEVEL_OUTOF10: 1

## 2023-05-31 ASSESSMENT — PAIN DESCRIPTION - DESCRIPTORS: DESCRIPTORS: SORE;NUMBNESS

## 2023-05-31 ASSESSMENT — PAIN DESCRIPTION - LOCATION: LOCATION: HAND

## 2023-05-31 ASSESSMENT — PAIN DESCRIPTION - FREQUENCY: FREQUENCY: INTERMITTENT

## 2023-05-31 ASSESSMENT — PAIN DESCRIPTION - ORIENTATION: ORIENTATION: LEFT

## 2023-05-31 ASSESSMENT — PAIN DESCRIPTION - PAIN TYPE: TYPE: ACUTE PAIN

## 2023-05-31 NOTE — PROGRESS NOTES
[address]         Phone: [phone]       Fax: [phone]     Occupational Therapy Daily Treatment note     Occupational Therapy: Daily Note   Patient: Mariama Joe (59 y.o. female)   Examination Date: 2023  No data recorded  Progress Note Counter: MD appt 6-     :  1960 # of Visits since [facility]:   8   MRN: [id number]  CSN: [id number] Start of Care Date: 2023   Insurance: Payor: [place] / Plan: [place] / Product Type: *No Product type* /   Insurance ID: [id number] - (Medicaid Managed) Secondary Insurance (if applicable): Insurance Information: [address]  30 visits   Referring Physician: MD Nae Brunson APRN-CNP   PCP: Uzma Banuelos MD Visits to Date/Visits Approved:     No Show/Cancelled Appts:   /       Medical Diagnosis: S/P decompression of ulnar nerve at elbow [Z98.890]  Cervical myelopathy with cervical radiculopathy (HCC) [G95.9, M54.12] Z98.890 (ICD-10-CM) - S/P decompression of ulnar nerve at elbow  G95.9, M54.12 (ICD-10-CM) - Cervical myelopathy with cervical radiculopathy (HCC)  Treatment Diagnosis: lt UE/hand weakness, stiffness lt shoulder & wrist, impaired coordination, impaired sensation, pain (ICDD-10 codes :M62.81, M25.612, M25.632, R27.9, R20.2, M25.522, M79.632, M79.642,M25.532)        SUBJECTIVE EXAMINATION   Pain Level: Pain Screening  Patient Currently in Pain: Yes  Pain Assessment: 0-10  Pain Level: 1  Post Treatment Pain Level: 1  Pain Type: Acute pain  Pain Location: Hand  Pain Orientation: Left  Pain Descriptors: Sore, Numbness    Patient Comments: Subjective: Pt reports a little lt arm pain. Pt states that she stuffed bags today. Pt states they went to cem[place] on the holiday. Pt tired today.     HEP Compliance: Good        OBJECTIVE EXAMINATION   Restrictions:

## 2023-06-01 ENCOUNTER — HOSPITAL ENCOUNTER (OUTPATIENT)
Dept: OCCUPATIONAL THERAPY | Age: 63
Setting detail: THERAPIES SERIES
Discharge: HOME | End: 2023-06-01

## 2023-06-01 ENCOUNTER — HOSPITAL ENCOUNTER (OUTPATIENT)
Dept: PHYSICAL THERAPY | Age: 63
Setting detail: THERAPIES SERIES
Discharge: HOME | End: 2023-06-01

## 2023-06-01 ENCOUNTER — TELEPHONE (OUTPATIENT)
Dept: FAMILY MEDICINE CLINIC | Age: 63
End: 2023-06-01

## 2023-06-01 NOTE — PROGRESS NOTES
[address]   OCCUPATIONAL THERAPY MISSED TREATMENT NOTE   OUTPATIENT   Date: 23  Patient Name: Calin Hameed       Room: Room/bed info not found  MRN: [id number]   Account #: [de-identified]    : 1960  (65 y.o.)  Gender: female      OT Visit Information  Total # of Visits Approved: 20  Total # of Visits to Date: 9  Canceled Appointment: 2    REASON FOR MISSED TREATMENT:  Patient cancelled due to illness  Per :  Pt called and cancelled, reporting that she had not slept in 4 days.       Electronically signed by YAAKOV James/L on 23 at 3:17 PM EDT

## 2023-06-01 NOTE — FLOWSHEET NOTE
[] [facility] - [facility] & Therapy  [address]  [place]: [phone]   F: [phone]     Physical Therapy Cancel/No Show note    Date: 2023  Patient: Tracy Sheehan  : 1960  MRN: [id number]    Visit Count:   Cancels/No Shows to date:     For today's appointment patient:    [x]  Cancelled    [] Rescheduled appointment    [] No-show     Reason given by patient:    []  Patient ill    []  Conflicting appointment    [] No transportation      [] Conflict with work    [] No reason given    [] Weather related    [] COVID-19    [x] Other:      Comments: per  patient called and states she has not slept in 4 days.          [] Next appointment was confirmed    Electronically signed by: Kina Arroyo PTA

## 2023-06-02 RX ORDER — ROPINIROLE 2 MG/1
4 TABLET, FILM COATED ORAL NIGHTLY
Qty: 90 TABLET | Refills: 3 | Status: SHIPPED | OUTPATIENT
Start: 2023-06-02

## 2023-06-06 ENCOUNTER — HOSPITAL ENCOUNTER (OUTPATIENT)
Dept: OCCUPATIONAL THERAPY | Age: 63
Setting detail: THERAPIES SERIES
Discharge: HOME | End: 2023-06-06
Payer: COMMERCIAL

## 2023-06-06 ENCOUNTER — HOSPITAL ENCOUNTER (OUTPATIENT)
Dept: PHYSICAL THERAPY | Age: 63
Setting detail: THERAPIES SERIES
Discharge: HOME | End: 2023-06-06
Payer: COMMERCIAL

## 2023-06-06 PROCEDURE — 97113 AQUATIC THERAPY/EXERCISES: CPT

## 2023-06-06 PROCEDURE — 97110 THERAPEUTIC EXERCISES: CPT

## 2023-06-06 PROCEDURE — 97168 OT RE-EVAL EST PLAN CARE: CPT

## 2023-06-06 ASSESSMENT — PAIN DESCRIPTION - FREQUENCY: FREQUENCY: INTERMITTENT

## 2023-06-06 ASSESSMENT — 9 HOLE PEG TEST
TESTTIME_SECONDS: 41
TEST_RESULT: IMPAIRED

## 2023-06-06 ASSESSMENT — PAIN DESCRIPTION - DESCRIPTORS: DESCRIPTORS: ACHING;NAGGING

## 2023-06-06 ASSESSMENT — PAIN SCALES - GENERAL: PAINLEVEL_OUTOF10: 6

## 2023-06-06 ASSESSMENT — PAIN DESCRIPTION - PAIN TYPE: TYPE: ACUTE PAIN

## 2023-06-06 ASSESSMENT — PAIN DESCRIPTION - ORIENTATION: ORIENTATION: RIGHT;LEFT

## 2023-06-06 ASSESSMENT — PAIN DESCRIPTION - LOCATION: LOCATION: HAND

## 2023-06-06 NOTE — PROGRESS NOTES
Opening doors: 3   Cleanin   Tying or lacing shoes: 2  (Has been wearing mostly slip-on shoes)   Sleepin   Laundering clothes (eg washing, ironing, folding): 3   Opening a jar: 2   Throwing a ball: 2   Carrying a small suitcase with your affected limb: 2   UEFI Total Score 40   UEFI Total Percentage 50 %       TREATMENT     Exercises:     Treatment Reasoning    OT Exercise 2: yellow therapy ball  on table-using bilateral UE 2 sets 15x ( press into top of ball, roll ball on table forward/back,side to side, circles cw & ccw,  move ball for elbow flex/extension, raise ball up for shoulder flexion/extension)    Limitations addressed: Flexibility  Limitations addressed: lt UE rom, coordination, weakness  Therapist provided: Verbal cuing                     Patient Education: OT Education: Evaluative findings, Home Exercise Program  Patient Education: Encouraged pt to complete HEP as able. Also encouraged pt to speak to her medical team about help for sleep issues and pain management. Reviewed progress towards goals and POC. ASSESSMENT     Assessment: Assessment: Goals reassessed and pt making progress in several areas including AROM, pinch strength, coordination, and self-reported functional use of LUE. See progress note for details. Pt reporting difficulty getting any quality sleep for several days and increased pain, specifically in MP and PIP joints of L hand, making gripping more difficult. Pt required slow pace and rest breaks between measurements, exercises limited to stretching with yellow therapy ball. OT encouraged pt to reach out to her medical team for help with sleep issues and pain management. Pt verbalized understanding and agreement.   Performance deficits / Impairments: Decreased strength, Decreased fine motor control, Decreased high-level IADLs, Decreased sensation, Decreased ROM    Post-Treatment Pain Level: 6    Prognosis: Good    Activity Tolerance: Patient limited by fatigue;

## 2023-06-08 ENCOUNTER — APPOINTMENT (OUTPATIENT)
Dept: PHYSICAL THERAPY | Age: 63
End: 2023-06-08
Payer: COMMERCIAL

## 2023-06-08 ENCOUNTER — HOSPITAL ENCOUNTER (OUTPATIENT)
Dept: OCCUPATIONAL THERAPY | Age: 63
Setting detail: THERAPIES SERIES
Discharge: HOME | End: 2023-06-08
Payer: COMMERCIAL

## 2023-06-08 ENCOUNTER — HOSPITAL ENCOUNTER (OUTPATIENT)
Dept: PHYSICAL THERAPY | Age: 63
Setting detail: THERAPIES SERIES
Discharge: HOME | End: 2023-06-08
Payer: COMMERCIAL

## 2023-06-08 PROCEDURE — 97140 MANUAL THERAPY 1/> REGIONS: CPT

## 2023-06-08 PROCEDURE — 97110 THERAPEUTIC EXERCISES: CPT

## 2023-06-08 ASSESSMENT — PAIN DESCRIPTION - DESCRIPTORS: DESCRIPTORS: SORE

## 2023-06-08 ASSESSMENT — PAIN SCALES - GENERAL: PAINLEVEL_OUTOF10: 2

## 2023-06-08 ASSESSMENT — PAIN DESCRIPTION - LOCATION: LOCATION: ARM

## 2023-06-08 ASSESSMENT — PAIN DESCRIPTION - ORIENTATION: ORIENTATION: LEFT

## 2023-06-08 ASSESSMENT — PAIN DESCRIPTION - PAIN TYPE: TYPE: ACUTE PAIN

## 2023-06-08 NOTE — PROGRESS NOTES
[address]         Phone: [phone]       Fax: [phone]     Occupational Therapy Daily Treatment note     Occupational Therapy: Daily Note   Patient: Iliana Blackwell (42 y.o. female)   Examination Date: 2023  No data recorded  Progress Note Counter: MD appt 6-     :  1960 # of Visits since [facility]:   10   MRN: [id number]  CSN: [id number] Start of Care Date: 2023   Insurance: Payor: Christinajani Juliana / Plan: Lorenzo Valenzuela [place] / Product Type: *No Product type* /   Insurance ID: [id number] - (Medicaid Managed) Secondary Insurance (if applicable): Insurance Information: [address]  30 visits   Referring Physician: MD Myra Wise APRN-CNP   PCP: Laura Bhatia MD Visits to Date/Visits Approved:     No Show/Cancelled Appts:   /       Medical Diagnosis: S/P decompression of ulnar nerve at elbow [Z98.890]  Cervical myelopathy with cervical radiculopathy (HCC) [G95.9, M54.12] Z98.890 (ICD-10-CM) - S/P decompression of ulnar nerve at elbow  G95.9, M54.12 (ICD-10-CM) - Cervical myelopathy with cervical radiculopathy (HCC)  Treatment Diagnosis: lt UE/hand weakness, stiffness lt shoulder & wrist, impaired coordination, impaired sensation, pain (ICDD-10 codes :M62.81, M25.612, M25.632, R27.9, R20.2, M25.522, M79.632, M79.642,M25.532)        SUBJECTIVE EXAMINATION   Pain Level: Pain Screening  Patient Currently in Pain: Yes  Pain Assessment: 0-10  Pain Level: 2  Post Treatment Pain Level: 2  Pain Type: Acute pain  Pain Location: Arm  Pain Orientation: Left  Pain Descriptors: Sore    Patient Comments: Subjective: Pt states that she sneezed and has increase rt flank pain at 9, hips & legs pain 4, and lt arm pain 2. Pt states that she will MD next week.     HEP Compliance: Good        OBJECTIVE EXAMINATION   Restrictions:

## 2023-06-08 NOTE — FLOWSHEET NOTE
[] [facility]) - [facility] & Therapy  [address]  [place]: [phone]   F: [phone]     Physical Therapy Cancel/No Show note    Date: 2023  Patient: Arjun Gill  : 1960  MRN: [id number]    Visit Count:   Cancels/No Shows to date:     For today's appointment patient:    [x]  Cancelled    [] Rescheduled appointment    [] No-show     Reason given by patient:    []  Patient ill    []  Conflicting appointment    [] No transportation      [] Conflict with work    [] No reason given    [] Weather related    [] COVID-19    [x] Other:      Comments:  reports increased pain after sneezing      [x] Next appointment was confirmed    Electronically signed by: Екатерина Chavez PTA

## 2023-06-15 ENCOUNTER — OFFICE VISIT (OUTPATIENT)
Dept: NEUROSURGERY | Age: 63
End: 2023-06-15
Payer: COMMERCIAL

## 2023-06-15 ENCOUNTER — TELEPHONE (OUTPATIENT)
Dept: NEUROSURGERY | Age: 63
End: 2023-06-15

## 2023-06-15 VITALS
BODY MASS INDEX: 36.62 KG/M2 | WEIGHT: 199 LBS | OXYGEN SATURATION: 95 % | TEMPERATURE: 97.4 F | HEART RATE: 81 BPM | HEIGHT: 62 IN | DIASTOLIC BLOOD PRESSURE: 83 MMHG | SYSTOLIC BLOOD PRESSURE: 127 MMHG

## 2023-06-15 DIAGNOSIS — Z98.890 S/P DECOMPRESSION OF ULNAR NERVE AT ELBOW: ICD-10-CM

## 2023-06-15 DIAGNOSIS — G56.22 ULNAR NEUROPATHY AT ELBOW, LEFT: ICD-10-CM

## 2023-06-15 DIAGNOSIS — G57.02 PIRIFORMIS SYNDROME OF LEFT SIDE: ICD-10-CM

## 2023-06-15 DIAGNOSIS — M48.07 STENOSIS OF LATERAL RECESS OF LUMBOSACRAL SPINE: Primary | ICD-10-CM

## 2023-06-15 DIAGNOSIS — G95.9 CERVICAL MYELOPATHY WITH CERVICAL RADICULOPATHY (HCC): ICD-10-CM

## 2023-06-15 DIAGNOSIS — M54.12 CERVICAL MYELOPATHY WITH CERVICAL RADICULOPATHY (HCC): ICD-10-CM

## 2023-06-15 PROCEDURE — 3078F DIAST BP <80 MM HG: CPT | Performed by: NEUROLOGICAL SURGERY

## 2023-06-15 PROCEDURE — 3074F SYST BP LT 130 MM HG: CPT | Performed by: NEUROLOGICAL SURGERY

## 2023-06-15 PROCEDURE — 99214 OFFICE O/P EST MOD 30 MIN: CPT | Performed by: NEUROLOGICAL SURGERY

## 2023-06-15 RX ORDER — OXYCODONE HYDROCHLORIDE AND ACETAMINOPHEN 5; 325 MG/1; MG/1
1 TABLET ORAL PRN
Qty: 12 TABLET | Refills: 0 | Status: SHIPPED | OUTPATIENT
Start: 2023-06-15 | End: 2023-06-18

## 2023-06-19 NOTE — TELEPHONE ENCOUNTER
Contacted the pharmacy. Pharmacy states to be sure that when the new script is sent, that it is sent with updated instruction. Writer informed the pharmacy tech that EMG test is in September, so no refill is needed at this time.

## 2023-06-20 ENCOUNTER — OFFICE VISIT (OUTPATIENT)
Dept: FAMILY MEDICINE CLINIC | Age: 63
End: 2023-06-20
Payer: COMMERCIAL

## 2023-06-20 ENCOUNTER — HOSPITAL ENCOUNTER (OUTPATIENT)
Age: 63
Setting detail: SPECIMEN
Discharge: HOME | End: 2023-06-20

## 2023-06-20 VITALS
BODY MASS INDEX: 37.02 KG/M2 | HEIGHT: 62 IN | DIASTOLIC BLOOD PRESSURE: 75 MMHG | HEART RATE: 80 BPM | SYSTOLIC BLOOD PRESSURE: 119 MMHG | WEIGHT: 201.2 LBS

## 2023-06-20 DIAGNOSIS — N18.2 TYPE 2 DIABETES MELLITUS WITH STAGE 2 CHRONIC KIDNEY DISEASE, WITHOUT LONG-TERM CURRENT USE OF INSULIN (HCC): ICD-10-CM

## 2023-06-20 DIAGNOSIS — E11.9 TYPE 2 DIABETES MELLITUS WITHOUT COMPLICATION, WITHOUT LONG-TERM CURRENT USE OF INSULIN (HCC): Primary | ICD-10-CM

## 2023-06-20 DIAGNOSIS — E11.9 TYPE 2 DIABETES MELLITUS WITHOUT COMPLICATION, WITHOUT LONG-TERM CURRENT USE OF INSULIN (HCC): ICD-10-CM

## 2023-06-20 DIAGNOSIS — E11.22 TYPE 2 DIABETES MELLITUS WITH STAGE 2 CHRONIC KIDNEY DISEASE, WITHOUT LONG-TERM CURRENT USE OF INSULIN (HCC): ICD-10-CM

## 2023-06-20 LAB
ANION GAP SERPL CALCULATED.3IONS-SCNC: 14 MMOL/L (ref 9–17)
BUN SERPL-MCNC: 23 MG/DL (ref 8–23)
CALCIUM SERPL-MCNC: 9.6 MG/DL (ref 8.6–10.4)
CHLORIDE SERPL-SCNC: 104 MMOL/L (ref 98–107)
CO2 SERPL-SCNC: 22 MMOL/L (ref 20–31)
CREAT SERPL-MCNC: 1 MG/DL (ref 0.5–0.9)
GFR SERPL CREATININE-BSD FRML MDRD: >60 ML/MIN/1.73M2
GLUCOSE SERPL-MCNC: 186 MG/DL (ref 70–99)
GLUCOSE, WHOLE BLOOD: 300
HBA1C MFR BLD: 9 %
POTASSIUM SERPL-SCNC: 4.9 MMOL/L (ref 3.7–5.3)
SODIUM SERPL-SCNC: 140 MMOL/L (ref 135–144)

## 2023-06-20 PROCEDURE — 82947 ASSAY GLUCOSE BLOOD QUANT: CPT | Performed by: FAMILY MEDICINE

## 2023-06-20 PROCEDURE — 3074F SYST BP LT 130 MM HG: CPT | Performed by: FAMILY MEDICINE

## 2023-06-20 PROCEDURE — 83037 HB GLYCOSYLATED A1C HOME DEV: CPT | Performed by: FAMILY MEDICINE

## 2023-06-20 PROCEDURE — 99214 OFFICE O/P EST MOD 30 MIN: CPT | Performed by: FAMILY MEDICINE

## 2023-06-20 PROCEDURE — 3078F DIAST BP <80 MM HG: CPT | Performed by: FAMILY MEDICINE

## 2023-06-20 PROCEDURE — 3052F HG A1C>EQUAL 8.0%<EQUAL 9.0%: CPT | Performed by: FAMILY MEDICINE

## 2023-06-20 RX ORDER — BLOOD-GLUCOSE SENSOR
1 EACH MISCELLANEOUS CONTINUOUS
Qty: 1 EACH | Refills: 5 | Status: SHIPPED | OUTPATIENT
Start: 2023-06-20

## 2023-06-20 RX ORDER — VENLAFAXINE HYDROCHLORIDE 37.5 MG/1
75 CAPSULE, EXTENDED RELEASE ORAL DAILY
Qty: 90 CAPSULE | Refills: 3 | Status: SHIPPED | OUTPATIENT
Start: 2023-06-20

## 2023-06-20 RX ORDER — SEMAGLUTIDE 1.7 MG/.75ML
1.7 INJECTION, SOLUTION SUBCUTANEOUS
Qty: 4 ML | Refills: 5 | Status: SHIPPED | OUTPATIENT
Start: 2023-06-20

## 2023-06-20 ASSESSMENT — ENCOUNTER SYMPTOMS
CHEST TIGHTNESS: 0
COUGH: 0
DIARRHEA: 0
SHORTNESS OF BREATH: 0
ABDOMINAL PAIN: 0
CONSTIPATION: 0

## 2023-06-20 NOTE — PROGRESS NOTES
Visit Information    Have you changed or started any medications since your last visit including any over-the-counter medicines, vitamins, or herbal medicines? no   Have you stopped taking any of your medications? Is so, why? -  no  Are you having any side effects from any of your medications? - no    Have you seen any other physician or provider since your last visit? yes - PT/OT, NEUROLOGY, NEUROSURGERY   Have you had any other diagnostic tests since your last visit?  no   Have you been seen in the emergency room and/or had an admission in a hospital since we last saw you?  no   Have you had your routine dental cleaning in the past 6 months?  no     Do you have an active MyChart account? If no, what is the barrier?   Yes    Patient Care Team:  Sean Khanna MD as PCP - General (Family Medicine)  Sean Khanna MD as PCP - Empaneled Provider  Jack Benites MD as Consulting Physician (Gastroenterology)    Medical History Review  Past Medical, Family, and Social History reviewed and does not contribute to the patient presenting condition    Health Maintenance   Topic Date Due    Shingles vaccine (1 of 2) Never done    Diabetic retinal exam  01/12/2017    Diabetic foot exam  12/01/2017    COVID-19 Vaccine (3 - Booster for Aminata Brinks series) 04/08/2021    Lipids  06/24/2022    Diabetic Alb to Cr ratio (uACR) test  09/22/2023    A1C test (Diabetic or Prediabetic)  04/11/2024    GFR test (Diabetes, CKD 3-4, OR last GFR 15-59)  04/11/2024    Depression Monitoring  05/09/2024    Breast cancer screen  04/25/2025    Colorectal Cancer Screen  11/22/2025    DTaP/Tdap/Td vaccine (4 - Td or Tdap) 08/25/2032    Flu vaccine  Completed    Pneumococcal 0-64 years Vaccine  Completed    Hepatitis C screen  Completed    HIV screen  Completed    Hepatitis A vaccine  Aged Out    Hib vaccine  Aged Out    Meningococcal (ACWY) vaccine  Aged Out    Depression Screen  Discontinued
Mood normal.         Behavior: Behavior normal.         Thought Content: Thought content normal.         Judgment: Judgment normal.       Lab Results   Component Value Date    WBC 7.7 04/11/2023    HGB 12.0 04/11/2023    HCT 40.1 04/11/2023     04/11/2023    CHOL 154 06/24/2021    TRIG 217 (H) 06/24/2021    HDL 59 06/24/2021    LDLDIRECT 156 (H) 05/20/2017    ALT 58 (H) 10/04/2022    AST 35 (H) 10/04/2022     04/11/2023    K 4.6 04/11/2023     04/11/2023    CREATININE 1.07 (H) 04/11/2023    BUN 21 04/11/2023    CO2 25 04/11/2023    TSH 1.37 07/06/2022    INR 0.9 10/14/2022    GLUF 262 (H) 09/10/2018    LABA1C 7.6 (H) 04/11/2023    LABMICR Can not be calculated 09/22/2022     Lab Results   Component Value Date    CALCIUM 9.7 04/11/2023    PHOS 4.1 04/11/2023     Lab Results   Component Value Date    LDLCHOLESTEROL 52 06/24/2021    LDLDIRECT 156 (H) 05/20/2017       Assessment and Plan:      DM- Poor controlled. A1c 9 Start Wegovy 1.7 mg/ Diabetes education  HTN- well Controlled  DLP- stable  Chronic Neck and back pain- seeing pain management  Depression- Increased Effexor 75 mg    Requested Prescriptions     Pending Prescriptions Disp Refills    Semaglutide-Weight Management (WEGOVY) 1.7 MG/0.75ML SOAJ SC injection 4 mL 5     Sig: Inject 1.7 mg into the skin every 7 days       There are no discontinued medications. Kavya received counseling on the following healthy behaviors:nutrition, exercise and medication adherence    Discussed use, benefit, and side effects of prescribed medications. Barriers to medication compliance addressed. All patient questions answered. Pt voicedunderstanding. No follow-ups on file.

## 2023-06-22 ENCOUNTER — HOSPITAL ENCOUNTER (OUTPATIENT)
Dept: OCCUPATIONAL THERAPY | Age: 63
Setting detail: THERAPIES SERIES
Discharge: HOME | End: 2023-06-22
Payer: COMMERCIAL

## 2023-06-22 PROCEDURE — 97110 THERAPEUTIC EXERCISES: CPT

## 2023-06-22 PROCEDURE — 97140 MANUAL THERAPY 1/> REGIONS: CPT

## 2023-06-22 ASSESSMENT — PAIN SCALES - GENERAL: PAINLEVEL_OUTOF10: 8

## 2023-06-22 ASSESSMENT — PAIN DESCRIPTION - PAIN TYPE: TYPE: ACUTE PAIN

## 2023-06-22 ASSESSMENT — PAIN DESCRIPTION - ORIENTATION: ORIENTATION: LEFT;RIGHT

## 2023-06-22 NOTE — PROGRESS NOTES
[address]         Phone: [phone]       Fax: [phone]     Occupational Therapy Daily Treatment note     Occupational Therapy: Daily Note   Patient: Nancy Khanna (17 y.o. female)   Examination Date: 2023  No data recorded  Progress Note Counter: MD appt 6-     :  1960 # of Visits since [facility]:   12   MRN: [id number]  CSN: [id number] Start of Care Date: 2023   Insurance: Payor: [employer] / Plan: [employer] / Product Type: *No Product type* /   Insurance ID: [id number] - (Medicaid Managed) Secondary Insurance (if applicable): Insurance Information: [address]  30 visits   Referring Physician: MD Horacio Rosas-CNP   PCP: Johnnie Mcadams MD Visits to Date/Visits Approved: 15 / 20    No Show/Cancelled Appts:   /       Medical Diagnosis: S/P decompression of ulnar nerve at elbow [Z98.890]  Cervical myelopathy with cervical radiculopathy (HCC) [G95.9, M54.12] Z98.890 (ICD-10-CM) - S/P decompression of ulnar nerve at elbow  G95.9, M54.12 (ICD-10-CM) - Cervical myelopathy with cervical radiculopathy (HCC)  Treatment Diagnosis: lt UE/hand weakness, stiffness lt shoulder & wrist, impaired coordination, impaired sensation, pain (ICDD-10 codes :M62.81, M25.612, M25.632, R27.9, R20.2, M25.522, M79.632, M79.642,M25.532)        SUBJECTIVE EXAMINATION   Pain Level: Pain Screening  Patient Currently in Pain: Yes  Pain Assessment: 0-10  Pain Level: 8  Post Treatment Pain Level: 8  Pain Type: Acute pain  Pain Location: Arm, Neck, Foot, Leg  Pain Orientation: Left, Right  Pain Descriptors: Aching, Numbness, Burning, Sore (numbness lt hand)    Patient Comments: Subjective: Pt reports pain in all her joints  legs/arms,feet, including her lt arm today. Pt states she didn't sleep last night and she paced a lot. Pt states that

## 2023-06-29 ENCOUNTER — HOSPITAL ENCOUNTER (OUTPATIENT)
Dept: OCCUPATIONAL THERAPY | Age: 63
Setting detail: THERAPIES SERIES
Discharge: HOME | End: 2023-06-29
Payer: COMMERCIAL

## 2023-06-29 PROCEDURE — 97140 MANUAL THERAPY 1/> REGIONS: CPT

## 2023-06-29 PROCEDURE — 97110 THERAPEUTIC EXERCISES: CPT

## 2023-06-29 ASSESSMENT — PAIN DESCRIPTION - PAIN TYPE: TYPE: ACUTE PAIN

## 2023-06-29 ASSESSMENT — PAIN SCALES - GENERAL: PAINLEVEL_OUTOF10: 3

## 2023-06-29 ASSESSMENT — PAIN DESCRIPTION - ORIENTATION: ORIENTATION: LEFT

## 2023-06-29 ASSESSMENT — PAIN DESCRIPTION - LOCATION: LOCATION: ARM

## 2023-07-06 ENCOUNTER — HOSPITAL ENCOUNTER (OUTPATIENT)
Dept: OCCUPATIONAL THERAPY | Age: 63
Setting detail: THERAPIES SERIES
Discharge: HOME | End: 2023-07-06
Payer: COMMERCIAL

## 2023-07-06 PROCEDURE — 97140 MANUAL THERAPY 1/> REGIONS: CPT

## 2023-07-06 PROCEDURE — 97110 THERAPEUTIC EXERCISES: CPT

## 2023-07-06 ASSESSMENT — PAIN DESCRIPTION - LOCATION: LOCATION: ARM;HAND

## 2023-07-06 ASSESSMENT — PAIN DESCRIPTION - ORIENTATION: ORIENTATION: LEFT

## 2023-07-06 ASSESSMENT — PAIN SCALES - GENERAL: PAINLEVEL_OUTOF10: 4

## 2023-07-06 ASSESSMENT — PAIN DESCRIPTION - PAIN TYPE: TYPE: ACUTE PAIN

## 2023-07-06 ASSESSMENT — PAIN DESCRIPTION - FREQUENCY: FREQUENCY: INTERMITTENT

## 2023-07-06 NOTE — PROGRESS NOTES
[address]         Phone: [phone]       Fax: [phone]     Occupational Therapy Daily Treatment note     Occupational Therapy: Daily Note   Patient: Jemma Medrano (52 y.o. female)   Examination Date: 2023  No data recorded  Progress Note Counter: MD appt 6-     :  1960 # of Visits since [facility]:   15   MRN: [id number]  CSN: [id number] Start of Care Date: 2023   Insurance: Payor: [employer] [place] / Plan: [employer] [place] / Product Type: *No Product type* /   Insurance ID: [id number] - (Medicaid Managed) Secondary Insurance (if applicable): Insurance Information: [id number]  36 visits   Referring Physician: MD Aicha Arguelles APRN-CNP   PCP: Zeinab Linder MD Visits to Date/Visits Approved: 15 / 20    No Show/Cancelled Appts:   /       Medical Diagnosis: S/P decompression of ulnar nerve at elbow [Z98.890]  Cervical myelopathy with cervical radiculopathy (HCC) [G95.9, M54.12] Z98.890 (ICD-10-CM) - S/P decompression of ulnar nerve at elbow  G95.9, M54.12 (ICD-10-CM) - Cervical myelopathy with cervical radiculopathy (HCC)  Treatment Diagnosis: lt UE/hand weakness, stiffness lt shoulder & wrist, impaired coordination, impaired sensation, pain (ICDD-10 codes :M62.81, M25.612, M25.632, R27.9, R20.2, M25.522, M79.632, M79.642,M25.532)        SUBJECTIVE EXAMINATION   Pain Level: Pain Screening  Patient Currently in Pain: Yes  Pain Assessment: 0-10  Pain Level: 4  Post Treatment Pain Level: 4  Pain Type: Acute pain  Pain Location: Arm, Hand (Pain radiate from forearm to hand)  Pain Orientation: Left  Pain Descriptors: Numbness, Aching, Sore, Dull (Numb fingers)    Patient Comments: Subjective: Pt states earlier today her lt arm pain was 6 but now its 4. Pt reports she had a good holiday.  Pt states that she hasn't slept for 2 nights

## 2023-07-11 ENCOUNTER — PHARMACY VISIT (OUTPATIENT)
Dept: FAMILY MEDICINE CLINIC | Age: 63
End: 2023-07-11

## 2023-07-11 ENCOUNTER — HOSPITAL ENCOUNTER (OUTPATIENT)
Dept: OCCUPATIONAL THERAPY | Age: 63
Setting detail: THERAPIES SERIES
Discharge: HOME | End: 2023-07-11
Payer: COMMERCIAL

## 2023-07-11 DIAGNOSIS — Z79.899 MEDICATION MANAGEMENT: Primary | ICD-10-CM

## 2023-07-11 PROCEDURE — 99999 PR OFFICE/OUTPT VISIT,PROCEDURE ONLY: CPT | Performed by: PHARMACIST

## 2023-07-11 PROCEDURE — 97140 MANUAL THERAPY 1/> REGIONS: CPT

## 2023-07-11 PROCEDURE — 97110 THERAPEUTIC EXERCISES: CPT

## 2023-07-11 PROCEDURE — APPNB60 APP NON BILLABLE TIME 46-60 MINS: Performed by: PHARMACIST

## 2023-07-11 RX ORDER — BLOOD-GLUCOSE SENSOR
EACH MISCELLANEOUS
Qty: 2 EACH | Refills: 11 | Status: SHIPPED | OUTPATIENT
Start: 2023-07-11

## 2023-07-11 RX ORDER — DULAGLUTIDE 0.75 MG/.5ML
0.75 INJECTION, SOLUTION SUBCUTANEOUS WEEKLY
Qty: 2 ML | Refills: 1 | Status: SHIPPED | OUTPATIENT
Start: 2023-07-11

## 2023-07-11 ASSESSMENT — PAIN DESCRIPTION - PAIN TYPE: TYPE: ACUTE PAIN

## 2023-07-11 ASSESSMENT — PAIN DESCRIPTION - ORIENTATION: ORIENTATION: LEFT

## 2023-07-11 ASSESSMENT — PAIN DESCRIPTION - LOCATION: LOCATION: ARM;HAND

## 2023-07-11 ASSESSMENT — PAIN SCALES - GENERAL: PAINLEVEL_OUTOF10: 4

## 2023-07-11 NOTE — PROGRESS NOTES
Medication Management Service  [address] Jane is a 59 y.o. female that presents for an initial diabetes mellitus office visit with Medication Management Service per referral from Dr. Jimmy Aguilar MD for Diabetes Management under Collaborative Practice Agreement with A1c goal < 7 %. Subjective/Objective     New Problems/Changes: Has stopped Trulicty and was switched to Ozempic due to insurance. Patient felt like she wasn't getting the results that she was expecting. No reports of adverse effects. Maximum dose take was 1.5mg weekly. Patient is currently covered under [facility]. (Medicaid plan). Preferred formulary GLP1 agent for [address] [place] [address] is Trulicity. Ozempic is non-preferred with need to complete PA and provide support as to why patient is not able to take/tolerate preferred agent. Patient did not continue with Trulicity titration up to max dose of 4.5mg weekly. Patient also comes to office today with [employer] 3 sensor. Phone is compatible with Whitewater 3 application. DIET  Limited discussion around meals/meal prep today. Patient admits to not always making the best food choices. EXERCISE  Limited discussion today. WEIGHT  Weight: 91.3kg  BMI: 36.79 kg/m2    DIABETES MANAGEMENT    Diabetes Goals: Using ADA Standards of Care     Goal A1c:  Less than 7 %   Fasting Blood Sugars:  80-130mg/dL  Postprandial glucose:  Less than 180mg/dL     Lab Results   Component Value Date    LABA1C 9.0 06/20/2023    LABA1C 7.6 (H) 04/11/2023    LABA1C 7.7 11/08/2022     Current DM Medications  Glipizide 10mg tablet #180  90 day supply  Synjardy 5-1000mg twice daily. Wegovy  1.7mg-was not able to . Weight loss medication not covered by Medicaid plans. Ozempic not covered either. Pt did not start. Home Glucose Readings:  Clifford 3 sensors-brought sensor to appointment today.   Prodigy test strips-#100-last dispensed on

## 2023-07-11 NOTE — PROGRESS NOTES
75 [facility]  [address]         Phone: [phone]       Fax: [phone]     Occupational Therapy Daily Treatment note     Occupational Therapy: Daily Note   Patient: Robetra Sauceda (61 y.o. female)   Examination Date: 2023  No data recorded  Progress Note Counter: MD appt 6-     :  1960 # of Visits since [facility]:   15   MRN: [id number]  CSN: [id number] Start of Care Date: 2023   Insurance: Payor: Nissa Palacio [place] / Plan: Nissamaegan Palacio [place] / Product Type: *No Product type* /   Insurance ID: [id number] - (Medicaid Managed) Secondary Insurance (if applicable): Insurance Information: [id number]  18 visits   Referring Physician: MD Kimberly Rivers APRN-CNP   PCP: Jennifer Love MD Visits to Date/Visits Approved:  /      No Show/Cancelled Appts:   /       Medical Diagnosis: S/P decompression of ulnar nerve at elbow [Z98.890]  Cervical myelopathy with cervical radiculopathy ([address]) [G95.9, M54.12] Z98.890 (ICD-10-CM) - S/P decompression of ulnar nerve at elbow  G95.9, M54.12 (ICD-10-CM) - Cervical myelopathy with cervical radiculopathy (HCC)  Treatment Diagnosis: lt UE/hand weakness, stiffness lt shoulder & wrist, impaired coordination, impaired sensation, pain (ICDD-10 codes :M62.81, M25.612, M25.632, R27.9, R20.2, M25.522, M79.632, M79.642,M25.532)        SUBJECTIVE EXAMINATION   Pain Level: Pain Screening  Patient Currently in Pain: Yes  Pain Assessment: 0-10  Pain Level: 4  Post Treatment Pain Level: 4  Pain Type: Acute pain  Pain Location: Arm, Hand  Pain Orientation: Left  Pain Descriptors: Numbness, Dull, Sore    Patient Comments: Subjective: Pt reports being  tired today and having decrease sleep past 3 days. . Pt states she had good sleep last friday. Pt has blood sugar monitor small disc on rt upper arm that she recently got. Diamond Hamman     HEP

## 2023-07-13 ENCOUNTER — HOSPITAL ENCOUNTER (OUTPATIENT)
Dept: OCCUPATIONAL THERAPY | Age: 63
Setting detail: THERAPIES SERIES
Discharge: HOME | End: 2023-07-13
Payer: COMMERCIAL

## 2023-07-13 PROCEDURE — 97140 MANUAL THERAPY 1/> REGIONS: CPT

## 2023-07-13 PROCEDURE — 97110 THERAPEUTIC EXERCISES: CPT

## 2023-07-13 ASSESSMENT — PAIN SCALES - GENERAL: PAINLEVEL_OUTOF10: 3

## 2023-07-13 ASSESSMENT — PAIN DESCRIPTION - PAIN TYPE: TYPE: ACUTE PAIN

## 2023-07-13 ASSESSMENT — PAIN DESCRIPTION - LOCATION: LOCATION: HAND;WRIST;SHOULDER

## 2023-07-13 ASSESSMENT — PAIN DESCRIPTION - ORIENTATION: ORIENTATION: LEFT

## 2023-07-13 NOTE — PROGRESS NOTES
75 [facility]  [address]         Phone: [phone]       Fax: [phone]     Occupational Therapy Daily Treatment note     Occupational Therapy: Daily Note   Patient: Jose Roberto Tapia (73 y.o. female)   Examination Date: 2023  No data recorded  Progress Note Counter: MD appt 6-     :  1960 # of Visits since [facility]:   12   MRN: [id number]  CSN: [id number] Start of Care Date: 2023   Insurance: Payor: [employer] / Plan: [employer] / Product Type: *No Product type* /   Insurance ID: [id number] - (Medicaid Managed) Secondary Insurance (if applicable): Insurance Information: [id number]  40 visits   Referring Physician: MD Eveline Garcia APRN-CNP   PCP: Alyssa Monteiro MD Visits to Date/Visits Approved: 16 /      No Show/Cancelled Appts:   /       Medical Diagnosis: S/P decompression of ulnar nerve at elbow [Z98.890]  Cervical myelopathy with cervical radiculopathy ([address]) [G95.9, M54.12] Z98.890 (ICD-10-CM) - S/P decompression of ulnar nerve at elbow  G95.9, M54.12 (ICD-10-CM) - Cervical myelopathy with cervical radiculopathy (HCC)  Treatment Diagnosis: lt UE/hand weakness, stiffness lt shoulder & wrist, impaired coordination, impaired sensation, pain (ICDD-10 codes :M62.81, M25.612, M25.632, R27.9, R20.2, M25.522, M79.632, M79.642,M25.532)        SUBJECTIVE EXAMINATION   Pain Level: Pain Screening  Patient Currently in Pain: Yes  Pain Assessment: 0-10  Pain Level: 3  Post Treatment Pain Level: 3  Pain Type: Acute pain  Pain Location: Hand, Wrist, Shoulder  Pain Orientation: Left  Pain Descriptors: Sore, Numbness, Dull    Patient Comments: Subjective: Pt states that she got some rest last night and this morning. Pt reports that her pain is less today in lt wrist. Pt states her shoulders are a little sore. Pt states that she does her lt arm

## 2023-07-19 ENCOUNTER — HOSPITAL ENCOUNTER (OUTPATIENT)
Dept: OCCUPATIONAL THERAPY | Age: 63
Setting detail: THERAPIES SERIES
Discharge: HOME | End: 2023-07-19
Payer: COMMERCIAL

## 2023-07-19 PROCEDURE — 97140 MANUAL THERAPY 1/> REGIONS: CPT

## 2023-07-19 PROCEDURE — 97110 THERAPEUTIC EXERCISES: CPT

## 2023-07-19 ASSESSMENT — PAIN SCALES - GENERAL: PAINLEVEL_OUTOF10: 2

## 2023-07-19 ASSESSMENT — 9 HOLE PEG TEST
TESTTIME_SECONDS: 42
TEST_RESULT: IMPAIRED

## 2023-07-19 ASSESSMENT — PAIN DESCRIPTION - ORIENTATION: ORIENTATION: LEFT

## 2023-07-19 ASSESSMENT — PAIN DESCRIPTION - PAIN TYPE: TYPE: ACUTE PAIN

## 2023-07-19 ASSESSMENT — PAIN DESCRIPTION - LOCATION: LOCATION: SHOULDER

## 2023-07-19 NOTE — PROGRESS NOTES
4.33  Left Hand Strength - Tip (lbs)  Trial 1: 3  Trial 2: 3  Trial 3: 4  Average: 3.33     Fine Motor Skills:   Fine Motor Skills  Left 9-Hole Peg Test: Impaired  Left 9 Hole Peg Test Time (secs): 42   23 1146   Today, do you or would you have any difficulty at all with: Any of your usual work, housework, or school activities: 1  (housework moderate to quite a bit with lt UE)   Your usual hobbies, re creational or sporting activities: 1  (Mosque activities)   Lifting a bag of groceries to waist level: 1  (moderate if not too heavy, but if bunch cans quite a bit)   Lifting a bag of groceries above your head: 1   Grooming your hair: 2  (Pt uses both arms)   Pushing up on your hands (eg from bathtub or chair): 3  (little to moderate, but most of the time a little bit)   Preparing food (eg peeling, cutting): 2  (Pt cuts with rt hand and stabilizes food using lt hand)   Drivin  (Pt drives about 1x/month)   Vacuuming, sweeping or rakin  (Sweeping with broom little to moderate difficulty)   Dressing: 3   Doing up buttons: 2   Using tools or appliances: 2  (Using appliances with rt UE.)   Opening doors: 2  (little to moderate difficulty using rt UE depending on how heavy the door is)   Cleanin   Tying or lacing shoes: 2   Sleepin  (Extreme d/t pain & restless leg syndrome)   Laundering clothes (eg washing, ironing, folding): 3   Opening a jar: 1  (moderate to quite a bit)   Throwing a ball: 3   Carrying a small suitcase with your affected limb: 1  (moderate to quite a bit depending on it's wt.)   UEFI Total Score 36   UEFI Total Percentage 45 %   Upper Extremity Functional Scale -   Instruction to patient  - We are interested in knowing whether you are having any difficulty at all with the activities listed below because of your upper limb problem for which you are currently seeking attention.     Key:  0 = Extreme Difficulty or Unable to Perform Activity   1 = Quite a Bit of Difficulty   2 =

## 2023-07-24 NOTE — PROGRESS NOTES
180mg/dL     A1C MONITORING  Lab Results   Component Value Date    LABA1C 9.0 06/20/2023    LABA1C 7.6 (H) 04/11/2023    LABA1C 7.7 11/08/2022       RENAL MONITORING  Lab Results   Component Value Date/Time    MICROALBUR <12 09/22/2022 12:35 PM    LABCREA 32.1 09/22/2022 12:35 PM     CrCl cannot be calculated (Unknown ideal weight.). CHOLESTEROL MANAGEMENT  Lab Results   Component Value Date    CHOL 154 06/24/2021    TRIG 217 (H) 06/24/2021    HDL 59 06/24/2021    LDLCHOLESTEROL 52 06/24/2021    LDLDIRECT 156 (H) 05/20/2017     ALT   Date Value Ref Range Status   10/04/2022 58 (H) 5 - 33 U/L Final     AST   Date Value Ref Range Status   10/04/2022 35 (H) <32 U/L Final       BLOOD PRESSURE MANAGEMENT  BP Readings from Last 3 Encounters:   06/20/23 119/75   06/15/23 127/83   05/22/23 129/84       RISK MANAGEMENT  ASCVD RISK: The 10-year ASCVD risk score (Audie FERREIRA, et al., 2019) is: 7.2%    Values used to calculate the score:      Age: 59 years      Sex: Female      Is Non- : No      Diabetic: Yes      Tobacco smoker: No      Systolic Blood Pressure: 626 mmHg      Is BP treated: Yes      HDL Cholesterol: 59 mg/dL      Total Cholesterol: 154 mg/dL   On Statin: Yes  On ACE-I/ARB for HTN or Microalbuminuria: Yes  On GLP-1RA:Yes  On SGLT2i: Yes  Smoker: non-smoker  Immunizations Needed: Shingles, Hep B  Date of last eye exam: 07/21/2023  Date of last foot exam: 12/2022    Assessment/Plan     Diabetes Management: Controlled diabetes as evidenced by A1C of 9% (06/20/2023) (Goal < 7%). Currently on Glipizide, Synjardy, Trulicity. TIR = 63%. denies hypoglycemia. We provided education on diet and will continue titration of Trulicity. Pharmacologic therapy: Continue current reimen  When 7.45PF trulicity is finished, increase to 1.5 mg (08/08/2023) weekly. Order placed  Educated patient about the importance of finishing 4 weeks of the 9.98HA weekly Trulicity dose before going up to 1.5mg weekly.

## 2023-07-25 ENCOUNTER — PHARMACY VISIT (OUTPATIENT)
Dept: FAMILY MEDICINE CLINIC | Age: 63
End: 2023-07-25

## 2023-07-25 DIAGNOSIS — Z79.899 MEDICATION MANAGEMENT: Primary | ICD-10-CM

## 2023-07-25 PROCEDURE — 99999 PR OFFICE/OUTPT VISIT,PROCEDURE ONLY: CPT | Performed by: PHARMACIST

## 2023-07-25 PROCEDURE — APPNB60 APP NON BILLABLE TIME 46-60 MINS: Performed by: PHARMACIST

## 2023-07-25 RX ORDER — DULAGLUTIDE 1.5 MG/.5ML
1.5 INJECTION, SOLUTION SUBCUTANEOUS WEEKLY
Qty: 2 ML | Refills: 0 | Status: CANCELLED | OUTPATIENT
Start: 2023-07-25

## 2023-07-26 RX ORDER — DULAGLUTIDE 1.5 MG/.5ML
1.5 INJECTION, SOLUTION SUBCUTANEOUS WEEKLY
Qty: 2 ML | Refills: 0 | Status: SHIPPED | OUTPATIENT
Start: 2023-07-26

## 2023-08-22 ENCOUNTER — PHARMACY VISIT (OUTPATIENT)
Dept: FAMILY MEDICINE CLINIC | Age: 63
End: 2023-08-22

## 2023-08-22 VITALS — BODY MASS INDEX: 36.72 KG/M2 | WEIGHT: 200.8 LBS

## 2023-08-22 DIAGNOSIS — Z79.899 MEDICATION MANAGEMENT: Primary | ICD-10-CM

## 2023-08-22 PROCEDURE — APPNB60 APP NON BILLABLE TIME 46-60 MINS: Performed by: PHARMACIST

## 2023-08-22 PROCEDURE — 99999 PR OFFICE/OUTPT VISIT,PROCEDURE ONLY: CPT | Performed by: PHARMACIST

## 2023-08-22 RX ORDER — DULAGLUTIDE 3 MG/.5ML
3 INJECTION, SOLUTION SUBCUTANEOUS WEEKLY
Qty: 2 ML | Refills: 0 | Status: SHIPPED | OUTPATIENT
Start: 2023-08-22

## 2023-08-22 NOTE — PROGRESS NOTES
Medication Management Service  [address] Fazal Fuchs is a 59 y.o. female that presents for a follow-up diabetes mellitus office visit with Medication Management Service per referral from Dr. Kalpesh Garrido for Diabetes Management under Collaborative Practice Agreement with A1c goal < 7 %. Patient PMH includes HTN, CKD 3, DIEGO, T2DM, sciatica, neuropathy, osteoarthritis, obesity, MDD, HLD, RLS. Complications from DM Include Neuropathy and CKD. Subjective/Objective     New Problems/Changes since last visit: Patient increased to Trulicity 1.5 mg last week. Changed Trulicity days to Wednesdays. She reports no GI symptoms associated with Trulicity. Patient is eager to continue Trulicity titration as she believes it is helping her BG    Patient reports that yesterday she received shots for discs in neck. It was a very painful and stressful day for her, attributing to some high blood glucose readings. Additionally, after a painful procedure she had a milkshake as a treat. Patient is trying to use the planning healthy meals handout to make better food choices. She reports actively buying healthier snacks and trying to model her plate after the visual. She wants to try and use it more for her food choices. Patient reports having issues with scanning sensor with her phone to start new sensor. She can get it started it just takes awhile. Patient may be updating her phone soon. Discussed compatible phones with Faustino Anton in case she changes phones. LIFESTYLE  Diet: Patient admits to not always making the best food choices. Eats two meals  B: Skips, decreased toast. Eats cereal, banana, yogurt  L: Leftovers  D: meat and potato, reducing size of potato, hamburger or hot dogs, bratwurst. Takes buns of hamburger. Eating more salads with ranch or Trino. Cucumber and tomato salads   Drinks: water with crystal light, iced tea, coffee, sugar free-Icelandic vanilla purvia creamer.    Sweets/snacks:

## 2023-08-23 ENCOUNTER — TELEMEDICINE (OUTPATIENT)
Dept: NEUROLOGY | Age: 63
End: 2023-08-23
Payer: COMMERCIAL

## 2023-08-23 DIAGNOSIS — G95.9 CERVICAL MYELOPATHY WITH CERVICAL RADICULOPATHY (HCC): ICD-10-CM

## 2023-08-23 DIAGNOSIS — R41.89 BRAIN FOG: Primary | ICD-10-CM

## 2023-08-23 DIAGNOSIS — M54.12 CERVICAL MYELOPATHY WITH CERVICAL RADICULOPATHY (HCC): ICD-10-CM

## 2023-08-23 PROCEDURE — 99214 OFFICE O/P EST MOD 30 MIN: CPT | Performed by: STUDENT IN AN ORGANIZED HEALTH CARE EDUCATION/TRAINING PROGRAM

## 2023-08-23 RX ORDER — GABAPENTIN 300 MG/1
600 CAPSULE ORAL 3 TIMES DAILY
Qty: 180 CAPSULE | Refills: 0 | Status: SHIPPED | OUTPATIENT
Start: 2023-08-23 | End: 2023-09-22

## 2023-08-23 RX ORDER — GUAIFENESIN 600 MG/1
600 TABLET, EXTENDED RELEASE ORAL 2 TIMES DAILY
Qty: 30 TABLET | Refills: 0 | Status: SHIPPED | OUTPATIENT
Start: 2023-08-23 | End: 2023-09-07

## 2023-08-23 NOTE — PROGRESS NOTES
[address]  [facility] [address]  Dept: [phone]  Dept Fax: [phone]    NEUROLOGY CLINIC NOTE       PATIENT NAME: Jann Gonsalez  PATIENT MRN: [id number]  PRIMARY CARE PHYSICIAN: London Foster MD      Today's notes:     8/23/2023: virtual visit: the patient still complains of numbness of bilateral hands; pinky and ring fingers bilaterally worsening on the right right side but still the left hand worse than the right. She saw Dr. Shantelle Mckeon in June 2023. She is seeing pain management  for injections, got neck injections and reported pain has been gettign better. Brain fogginess still there, impaired concentration and memory has been ongoing. She reported that she cannot sleep; sleeps at 10 then wakes up at 12. Snoring. Left foot dragging and has been numb recently as well. She has been experiencing what appears to be sinusitis. HPI:      This was a virtual visit. Low back pain and left hip pain secondary to left piriformis syndrome. Jann Gonsalez is a 65years old female patient. She follows up with neurology as a case of left hip and low back pain for at least a year, was referred by family medicine in 4/6/22 which was her first visit to our neurology service. She had a hospital encounter in October 16 2022; she presented with constant left buttock pain for pain control and evaluation. She had MRI pelvis WWO; slight asymmetric atrophy of the left-sided piriformis muscle which can be seen with left-sided piriformis syndrome. Mild to moderate nonspecific edema in the right gluteus julissa muscle with no significant muscle atrophy. Mild nonspecific edema in the posterior left obturator internus muscle and mild nonspecific edema in the subcutaneous fat at the lateral aspect of the right hip/thigh; she was treated with steroids epidural injection IR guided in addition to percocet and analgesics.  She has left cubital tunnel

## 2023-09-07 ENCOUNTER — OFFICE VISIT (OUTPATIENT)
Dept: NEUROSURGERY | Age: 63
End: 2023-09-07
Payer: COMMERCIAL

## 2023-09-07 VITALS
WEIGHT: 200 LBS | RESPIRATION RATE: 16 BRPM | OXYGEN SATURATION: 96 % | HEART RATE: 74 BPM | SYSTOLIC BLOOD PRESSURE: 134 MMHG | BODY MASS INDEX: 36.8 KG/M2 | HEIGHT: 62 IN | DIASTOLIC BLOOD PRESSURE: 82 MMHG

## 2023-09-07 DIAGNOSIS — M53.3 SACROILIAC JOINT PAIN: Primary | ICD-10-CM

## 2023-09-07 DIAGNOSIS — G57.02 PIRIFORMIS SYNDROME OF LEFT SIDE: ICD-10-CM

## 2023-09-07 DIAGNOSIS — G56.22 ULNAR NEUROPATHY AT ELBOW, LEFT: ICD-10-CM

## 2023-09-07 DIAGNOSIS — M54.12 CERVICAL MYELOPATHY WITH CERVICAL RADICULOPATHY (HCC): ICD-10-CM

## 2023-09-07 DIAGNOSIS — Z98.890 S/P DECOMPRESSION OF ULNAR NERVE AT ELBOW: ICD-10-CM

## 2023-09-07 DIAGNOSIS — G95.9 CERVICAL MYELOPATHY WITH CERVICAL RADICULOPATHY (HCC): ICD-10-CM

## 2023-09-07 PROCEDURE — 3075F SYST BP GE 130 - 139MM HG: CPT | Performed by: NEUROLOGICAL SURGERY

## 2023-09-07 PROCEDURE — 99214 OFFICE O/P EST MOD 30 MIN: CPT | Performed by: NEUROLOGICAL SURGERY

## 2023-09-07 PROCEDURE — 3079F DIAST BP 80-89 MM HG: CPT | Performed by: NEUROLOGICAL SURGERY

## 2023-09-07 RX ORDER — LATANOPROST 50 UG/ML
SOLUTION/ DROPS OPHTHALMIC
COMMUNITY
Start: 2023-08-31

## 2023-09-07 NOTE — PROGRESS NOTES
Department of Neurosurgery                                                      Follow up visit      History Obtained From:  patient, spouse    CHIEF COMPLAINT:         Chief Complaint   Patient presents with    Back Pain     Stenosis of lateral recess of lumbosacral spine       HISTORY OF PRESENT ILLNESS:       The patient is a 59 y.o. 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 Patient reports that she is having neck pain, which is her most bothersome symptom. Patient reports that her neck does not have much ROM. Patient also reports of lower back pain that radiates down the her legs with paresthesia. She denies any balance problems. The pain is worst in her left leg than her right but it is still present bilaterally. She reports that all of the toes in her left foot are numb, which has been getting worst since her last visit. She describes this pain as mostly \"constant and dull\" but can also be \"sharp and stabby\" at times. This pain is aggravated when she goes from laying down or sitting to standing up. This has been ongoing for around a year, where her original symptom was back pain. Patient reports that occasionally this pain can radiate to her groin but this does not occur often. Patient reports she has been experiencing numbness in her palms. Her tremor is still present in her right hand and is managed by a neurologist.    Patient reports that she has been seeing Dr. Sondra Todd for nerve blocks and will be receiving an ablation at [facility]    She has a hx of lumbar injections with Dr. Rock Mcgraw in pain management, which helped her for a few weeks. Patient presented today with a cane for ambulation    She will undergo an EMG test later this month for her legs.     PAST MEDICAL HISTORY :

## 2023-09-08 DIAGNOSIS — I10 PRIMARY HYPERTENSION: ICD-10-CM

## 2023-09-08 DIAGNOSIS — E11.9 TYPE 2 DIABETES MELLITUS WITHOUT COMPLICATION, WITHOUT LONG-TERM CURRENT USE OF INSULIN (HCC): ICD-10-CM

## 2023-09-11 RX ORDER — LISINOPRIL 40 MG/1
TABLET ORAL
Qty: 30 TABLET | Refills: 2 | Status: SHIPPED | OUTPATIENT
Start: 2023-09-11

## 2023-09-11 NOTE — TELEPHONE ENCOUNTER
Last visit: 05/09/2023  Last Med refill: 05/17/2023  Does patient have enough medication for 72 hours: No:     Next Visit Date:  Future Appointments   Date Time Provider [facility] [address]   9/19/2023 10:00 AM Veronica Dent, [id number] [place] [address]   9/26/2023  2:00 PM Shital Lemus MD [facility]   10/3/2023 10:00 AM Gwen Downing MD [address] [place] [address]   12[facility]14/2023  9[facility] [address] [place] [address]   Topic Date Due    Shingles vaccine (1 of 2) Never done    Diabetic retinal exam  01/12/2017    Diabetic foot exam  12/01/2017    Hepatitis B vaccine (1 of 3 - Risk 3-dose series) Never done    COVID-19 Vaccine (3 - Moderna series) 04/08/2021    Lipids  06/24/2022    Flu vaccine (1) 08/01/2023    A1C test (Diabetic or Prediabetic)  09/20/2023    Diabetic Alb to Cr ratio (uACR) test  09/22/2023    Depression Monitoring  05/09/2024    GFR test (Diabetes, CKD 3-4, OR last GFR 15-59)  06/20/2024    Breast cancer screen  04/25/2025    Colorectal Cancer Screen  11/22/2025    DTaP/Tdap/Td vaccine (4 - Td or Tdap) 08/25/2032    Pneumococcal 0-64 years Vaccine  Completed    Hepatitis C screen  Completed    HIV screen  Completed    Hepatitis A vaccine  Aged Out    Hib vaccine  Aged Out    Meningococcal (ACWY) vaccine  Aged Out    Depression Screen  Discontinued       Hemoglobin A1C (%)   Date Value   06/20/2023 9.0   04/11/2023 7.6 (H)   11/08/2022 7.7             ( goal A1C is < 7)   No components found for: \"LABMICR\"  LDL Cholesterol (mg/dL)   Date Value   06/24/2021 52   04/20/2021 48       (goal LDL is <100)   AST (U/L)   Date Value   10/04/2022 35 (H)     ALT (U/L)   Date Value   10/04/2022 58 (H)     BUN (mg/dL)   Date Value   06/20/2023 23     BP Readings from Last 3 Encounters:   09/07/23 134/82   06/20/23 119/75   06/15/23 127/83          (goal 120/80)    All Future Testing planned in [facility]  Lab Frequency Next Occurrence   TSH With Reflex Ft4 Once 08/23/2023   Vitamin B12 &

## 2023-09-19 ENCOUNTER — PHARMACY VISIT (OUTPATIENT)
Dept: FAMILY MEDICINE CLINIC | Age: 63
End: 2023-09-19

## 2023-09-19 VITALS — BODY MASS INDEX: 36.84 KG/M2 | WEIGHT: 201.41 LBS

## 2023-09-19 DIAGNOSIS — Z79.899 MEDICATION MANAGEMENT: Primary | ICD-10-CM

## 2023-09-19 PROCEDURE — APPNB60 APP NON BILLABLE TIME 46-60 MINS: Performed by: PHARMACIST

## 2023-09-19 NOTE — PROGRESS NOTES
[phone]      ==================================================================    For Pharmacy Admin Tracking Only    Program: Medication Management  CPA in place:  Yes  Time Spent (min): 60

## 2023-09-26 ENCOUNTER — PROCEDURE VISIT (OUTPATIENT)
Dept: PHYSICAL MEDICINE AND REHAB | Age: 63
End: 2023-09-26

## 2023-09-26 DIAGNOSIS — M54.50 CHRONIC LEFT-SIDED LOW BACK PAIN, UNSPECIFIED WHETHER SCIATICA PRESENT: ICD-10-CM

## 2023-09-26 DIAGNOSIS — G89.29 CHRONIC LEFT-SIDED LOW BACK PAIN, UNSPECIFIED WHETHER SCIATICA PRESENT: ICD-10-CM

## 2023-09-26 DIAGNOSIS — R20.2 PARESTHESIA OF BOTH LOWER EXTREMITIES: Primary | ICD-10-CM

## 2023-09-27 DIAGNOSIS — Z79.899 MEDICATION MANAGEMENT: ICD-10-CM

## 2023-09-28 NOTE — TELEPHONE ENCOUNTER
E-scribe request for TRULICITY. Please review and e-scribe if applicable.      Last Visit Date:  9/19/2023  Next Visit Date:  10/3/2023    Hemoglobin A1C (%)   Date Value   06/20/2023 9.0   04/11/2023 7.6 (H)   11/08/2022 7.7             ( goal A1C is < 7)   No components found for: \"LABMICR\"  LDL Cholesterol (mg/dL)   Date Value   06/24/2021 52       (goal LDL is <100)   AST (U/L)   Date Value   10/04/2022 35 (H)     ALT (U/L)   Date Value   10/04/2022 58 (H)     BUN (mg/dL)   Date Value   06/20/2023 23     BP Readings from Last 3 Encounters:   09/07/23 134/82   06/20/23 119/75   06/15/23 127/83          (goal 120/80)        Patient Active Problem List:     Cervical spondylosis     Type 2 diabetes mellitus without complication, without long-term current use of insulin (HCC)     Hypertension     Abnormal auditory perception     Nasal polyps     Osteoarthritis of left knee     Osteoarthritis of right knee     DIEGO (nonalcoholic steatohepatitis)     Vitamin D deficiency     Iron deficiency anemia     Dyslipidemia     Trochanteric bursitis     Chronic left shoulder pain     Restless legs syndrome     Generalized anxiety disorder     Toxic metabolic encephalopathy     Class 2 obesity in adult     Leg edema     Recurrent major depressive disorder (HCC)     Dermatitis     Retrolisthesis of vertebrae     Bilateral carpal tunnel syndrome     Intention tremor     Sciatica     Back pain with radiation     Left sciatic nerve pain     Lumbar disc disease with radiculopathy     Intractable back pain     Piriformis syndrome of left side     Ulnar neuropathy at elbow, left     S/P decompression of ulnar nerve at elbow     Cervical myelopathy with cervical radiculopathy (HCC)     Lumbar spondylosis     Right median nerve neuropathy     Sacroiliitis (HCC)     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 obesity monitored anesthesia care (MAC)

## 2023-10-02 RX ORDER — DULAGLUTIDE 3 MG/.5ML
INJECTION, SOLUTION SUBCUTANEOUS
Qty: 2 ML | Refills: 0 | Status: SHIPPED | OUTPATIENT
Start: 2023-10-02

## 2023-10-03 ENCOUNTER — OFFICE VISIT (OUTPATIENT)
Dept: FAMILY MEDICINE CLINIC | Age: 63
End: 2023-10-03
Payer: COMMERCIAL

## 2023-10-03 ENCOUNTER — HOSPITAL ENCOUNTER (OUTPATIENT)
Age: 63
Setting detail: SPECIMEN
Discharge: HOME | End: 2023-10-03

## 2023-10-03 VITALS
HEIGHT: 62 IN | SYSTOLIC BLOOD PRESSURE: 106 MMHG | WEIGHT: 198.6 LBS | DIASTOLIC BLOOD PRESSURE: 63 MMHG | BODY MASS INDEX: 36.55 KG/M2 | HEART RATE: 73 BPM

## 2023-10-03 DIAGNOSIS — E11.9 TYPE 2 DIABETES MELLITUS WITHOUT COMPLICATION, WITHOUT LONG-TERM CURRENT USE OF INSULIN (HCC): ICD-10-CM

## 2023-10-03 DIAGNOSIS — E11.9 TYPE 2 DIABETES MELLITUS WITHOUT COMPLICATION, WITHOUT LONG-TERM CURRENT USE OF INSULIN (HCC): Primary | ICD-10-CM

## 2023-10-03 LAB
CHOLEST SERPL-MCNC: 150 MG/DL (ref 0–199)
CHOLESTEROL/HDL RATIO: 2
CREAT UR-MCNC: 42.2 MG/DL (ref 28–217)
HBA1C MFR BLD: 8 %
HDLC SERPL-MCNC: 64 MG/DL
LDLC SERPL CALC-MCNC: 60 MG/DL (ref 0–100)
TOTAL PROTEIN, URINE: 5 MG/DL
TRIGL SERPL-MCNC: 129 MG/DL (ref 0–149)
URINE TOTAL PROTEIN CREATININE RATIO: 0.12 (ref 0–0.2)
VLDLC SERPL CALC-MCNC: 26 MG/DL

## 2023-10-03 PROCEDURE — 90686 IIV4 VACC NO PRSV 0.5 ML IM: CPT | Performed by: FAMILY MEDICINE

## 2023-10-03 PROCEDURE — 3078F DIAST BP <80 MM HG: CPT

## 2023-10-03 PROCEDURE — 99213 OFFICE O/P EST LOW 20 MIN: CPT

## 2023-10-03 PROCEDURE — 83036 HEMOGLOBIN GLYCOSYLATED A1C: CPT

## 2023-10-03 PROCEDURE — 3052F HG A1C>EQUAL 8.0%<EQUAL 9.0%: CPT

## 2023-10-03 PROCEDURE — 3074F SYST BP LT 130 MM HG: CPT

## 2023-10-03 RX ORDER — OMEPRAZOLE 20 MG/1
20 CAPSULE, DELAYED RELEASE ORAL 2 TIMES DAILY
Qty: 120 CAPSULE | Refills: 1 | Status: SHIPPED | OUTPATIENT
Start: 2023-10-03

## 2023-10-03 ASSESSMENT — ENCOUNTER SYMPTOMS
SHORTNESS OF BREATH: 0
COUGH: 0
ABDOMINAL PAIN: 0

## 2023-10-03 NOTE — PROGRESS NOTES
plan.  We will update patient's lipid panel and treat accordingly. Patient may benefit from keeping a blood pressure log with blood pressures less than 194 systolic. Return in about 3 months (around 1/3/2024) for HTN, DM.    (Tina aPdgett ) Dr. Alton Ferrer MD

## 2023-10-03 NOTE — PROGRESS NOTES
COVID-19 Vaccine (3 - Moderna series) 04/08/2021    Lipids  06/24/2022    Flu vaccine (1) 08/01/2023    A1C test (Diabetic or Prediabetic)  09/20/2023    Diabetic Alb to Cr ratio (uACR) test  09/22/2023    Depression Monitoring  05/09/2024    GFR test (Diabetes, CKD 3-4, OR last GFR 15-59)  06/20/2024    Breast cancer screen  04/25/2025    Colorectal Cancer Screen  11/22/2025    DTaP/Tdap/Td vaccine (4 - Td or Tdap) 08/25/2032    Pneumococcal 0-64 years Vaccine  Completed    Hepatitis C screen  Completed    HIV screen  Completed    Hepatitis A vaccine  Aged Out    Hib vaccine  Aged Out    Meningococcal (ACWY) vaccine  Aged Out    Depression Screen  Discontinued

## 2023-10-03 NOTE — PATIENT INSTRUCTIONS
Thank you for letting us take care of you today. We hope all your questions were addressed. If a question was overlooked or something else comes to mind after you return home, please contact a member of your Care Team listed below. Your Care Team at [facility] is Team #1  Carin Acosta, Faculty Advisor  Litzy Zambrano, Resident Physician  Oscar Ordonez, Resident Physician  Hyun Medina, Resident Physician  Ruthie Carballo, [facility], [address] [facility], [address] [facility], 111  [facility], [address] [facility], [facility], [facility], [address] [facility],   Stephenie Caballero, [facility] (Clinical Pharmacist)     Office phone number: [phone]    If you need to get in right away due to illness, please be advised we have \"Same Day\" appointments available Monday-Friday. Please call us at [phone] option #3 to schedule your \"Same Day\" appointment.

## 2023-10-04 NOTE — PROGRESS NOTES
[address] [facility],[address] PHYSICAL MEDICINE AND REHABILITATION  [address] [address]  Dept: [phone]  Dept Fax: [phone]    EMG/NCS Bilateral Lower Limbs    Subjective:     Judie Khoury is a 59y.o.-year-old female presenting with low back and buttock pain, left greater than right, for about 2 years. She also reports numbness and pain in the left greater than right lateral lower limb and dorsum of the foot. She notes weakness in the bilateral lower limbs, left greater than right, as well. She states that she also has numbness/tingling in the left greater than right upper limbs as well as weakness in the left hand. PMH:  +type 2 diabetes, no thyroid disease    PSH:  no back surgery    Objective:     Physical Exam    Constitutional: She appears well-developed and well-nourished. In no distress. Pulmonary/Chest: Respirations WNL and unlabored. Neurological: She is alert. Sensation to light touch decreased in the left lower limb compared to the right. Strength 4/5 in the bilateral lower limbs (give-way weakness noted). No clonus with passive ankle dorsiflexion bilaterally. Negative seated straight leg raise bilaterally. Skin: Skin is warm and dry with good turgor. Imaging  MRI lumbar spine, 10/2/22: IMPRESSION:  Degenerative change most pronounced in the lower lumbar spine with mild right foraminal narrowing at L5-S1 and lateral recess narrowing that is worse on the right compared to the left. Left lower limb EMG/NCS, 4/13/22:  Assessment:  Limited and incomplete testing. However, the electrophysiologic evidence would tend to argue against a significant peripheral neuropathy or acute radiculopathy, though I cannot rule out chronic abnormalities due to patient limitations on evaluation of motor unit action potentials.       Findings:     The procedure was explained to the patient prior to beginning the test.  Risks and

## 2023-10-09 RX ORDER — BUSPIRONE HYDROCHLORIDE 10 MG/1
10 TABLET ORAL 3 TIMES DAILY
Qty: 90 TABLET | Refills: 3 | Status: SHIPPED | OUTPATIENT
Start: 2023-10-09

## 2023-10-09 NOTE — TELEPHONE ENCOUNTER
Last visit: 10/3/23  Last Med refill: 5/9/23  Does patient have enough medication for 72 hours: No:     Next Visit Date:  Future Appointments   Date Time Provider [address]   10/24/2023 10:00 AM Cornelius Vera, [address] FP [facility]   10/24/2023  2:00 PM [facility]  [facility]   10/24/2023  2:00 PM Josh Oliveira MD [facility] med/reha [facility]   12/14/2023  9:30 AM Millicent Royal DO [address] [employer] Maintenance   Topic Date Due    Shingles vaccine (1 of 2) Never done    Diabetic retinal exam  01/12/2017    Diabetic foot exam  12/01/2017    Hepatitis B vaccine (1 of 3 - Risk 3-dose series) Never done    COVID-19 Vaccine (3 - Moderna series) 04/08/2021    Diabetic Alb to Cr ratio (uACR) test  09/22/2023    Depression Monitoring  05/09/2024    GFR test (Diabetes, CKD 3-4, OR last GFR 15-59)  06/20/2024    A1C test (Diabetic or Prediabetic)  10/03/2024    Lipids  10/03/2024    Breast cancer screen  04/25/2025    Colorectal Cancer Screen  11/22/2025    DTaP/Tdap/Td vaccine (4 - Td or Tdap) 08/25/2032    Flu vaccine  Completed    Pneumococcal 0-64 years Vaccine  Completed    Hepatitis C screen  Completed    HIV screen  Completed    Hepatitis A vaccine  Aged Out    Hib vaccine  Aged Out    Meningococcal (ACWY) vaccine  Aged Out    Depression Screen  Discontinued       Hemoglobin A1C (%)   Date Value   10/03/2023 8.0   06/20/2023 9.0   04/11/2023 7.6 (H)             ( goal A1C is < 7)   No components found for: \"LABMICR\"  LDL Cholesterol (mg/dL)   Date Value   10/03/2023 60   06/24/2021 52       (goal LDL is <100)   AST (U/L)   Date Value   10/04/2022 35 (H)     ALT (U/L)   Date Value   10/04/2022 58 (H)     BUN (mg/dL)   Date Value   06/20/2023 23     BP Readings from Last 3 Encounters:   10/03/23 106/63   09/07/23 134/82   06/20/23 119/75          (goal 120/80)    All Future Testing planned in [facility]  Lab Frequency Next Occurrence   TSH With Reflex Ft4 Once 08/23/2023   Vitamin B12 &

## 2023-10-12 ENCOUNTER — TELEPHONE (OUTPATIENT)
Dept: FAMILY MEDICINE CLINIC | Age: 63
End: 2023-10-12

## 2023-10-12 NOTE — TELEPHONE ENCOUNTER
Medication Management Service ([facility])  [facility]  [phone]     CLINICAL PHARMACY NOTE:      Spoke with patient. Patient reports that current [place] 3 sensor has stopped working. Patient was told by her pharmacy that they are not able to request an early refill on the sensor. Recommendation made to the patient to contact [employer] and request a replacement for non-functioning sensor. Patient is concerned that the sensor may not be sticking well. Offer made to patient to stop by office to  a couple of packets of skin tac for next sensor application  Patient opened to that idea and may stop down for the skin tac. Next Med Mgmt appointment scheduled for 10/24/2023. Office has 1 sensor sample left that could be given to patient if [employer] will not send replacement. Alfred Smith, Pharm. D., [address] Medication Management Service  [phone]  10/12/2023  3:46 PM

## 2023-10-24 ENCOUNTER — HOSPITAL ENCOUNTER (OUTPATIENT)
Dept: NEUROLOGY | Age: 63
Discharge: HOME | End: 2023-10-24
Payer: COMMERCIAL

## 2023-10-24 PROCEDURE — 95886 MUSC TEST DONE W/N TEST COMP: CPT | Performed by: PHYSICAL MEDICINE & REHABILITATION

## 2023-10-24 PROCEDURE — 95911 NRV CNDJ TEST 9-10 STUDIES: CPT | Performed by: PHYSICAL MEDICINE & REHABILITATION

## 2023-10-25 DIAGNOSIS — Z79.899 MEDICATION MANAGEMENT: ICD-10-CM

## 2023-10-25 RX ORDER — DULAGLUTIDE 3 MG/.5ML
INJECTION, SOLUTION SUBCUTANEOUS
Qty: 2 ML | Refills: 0 | Status: SHIPPED | OUTPATIENT
Start: 2023-10-25

## 2023-10-25 NOTE — TELEPHONE ENCOUNTER
Last visit: 10/03/23  Last Med refill: 10/02/23  Does patient have enough medication for 72 hours: no    Next Visit Date:  Future Appointments   Date Time Provider [address]   11/7/2023 10:00 AM Dannielle Lockett, [address] FP [facility]   12/14/2023  9:30 AM Val Shola, [address] [facility] [address] Maintenance   Topic Date Due    Shingles vaccine (1 of 2) Never done    Diabetic retinal exam  01/12/2017    Diabetic foot exam  12/01/2017    Hepatitis B vaccine (1 of 3 - Risk 3-dose series) Never done    COVID-19 Vaccine (3 - Moderna series) 04/08/2021    Diabetic Alb to Cr ratio (uACR) test  09/22/2023    Depression Monitoring  05/09/2024    GFR test (Diabetes, CKD 3-4, OR last GFR 15-59)  06/20/2024    A1C test (Diabetic or Prediabetic)  10/03/2024    Lipids  10/03/2024    Breast cancer screen  04/25/2025    Colorectal Cancer Screen  11/22/2025    DTaP/Tdap/Td vaccine (4 - Td or Tdap) 08/25/2032    Flu vaccine  Completed    Pneumococcal 0-64 years Vaccine  Completed    Hepatitis C screen  Completed    HIV screen  Completed    Hepatitis A vaccine  Aged Out    Hib vaccine  Aged Out    Meningococcal (ACWY) vaccine  Aged Out    Depression Screen  Discontinued       Hemoglobin A1C (%)   Date Value   10/03/2023 8.0   06/20/2023 9.0   04/11/2023 7.6 (H)             ( goal A1C is < 7)   No components found for: \"LABMICR\"  LDL Cholesterol (mg/dL)   Date Value   10/03/2023 60   06/24/2021 52       (goal LDL is <100)   AST (U/L)   Date Value   10/04/2022 35 (H)     ALT (U/L)   Date Value   10/04/2022 58 (H)     BUN (mg/dL)   Date Value   06/20/2023 23     BP Readings from Last 3 Encounters:   10/03/23 106/63   09/07/23 134/82   06/20/23 119/75          (goal 120/80)    All Future Testing planned in [facility]  Lab Frequency Next Occurrence   TSH With Reflex Ft4 Once 08/23/2023   Vitamin B12 & Folate Once 08/23/2023   Sleep Study with PAP Titration Once 08/24/2023               Patient Active Problem List:

## 2023-11-03 DIAGNOSIS — E11.9 TYPE 2 DIABETES MELLITUS WITHOUT COMPLICATION, WITHOUT LONG-TERM CURRENT USE OF INSULIN (HCC): ICD-10-CM

## 2023-11-03 DIAGNOSIS — I10 PRIMARY HYPERTENSION: ICD-10-CM

## 2023-11-03 NOTE — TELEPHONE ENCOUNTER
Last visit: 10/03/2023  Last Med refill: 09/11/2023 - 12/20/2022  Does patient have enough medication for 72 hours: No:     Next Visit Date:  Future Appointments   Date Time Provider [address]   11/7/2023 10:00 AM Fina Skdavian, [address] [facility]   12/14/2023  9:30 AM Diego Levine, [address] Maintenance   Topic Date Due    Shingles vaccine (1 of 2) Never done    Diabetic retinal exam  01/12/2017    Diabetic foot exam  12/01/2017    Hepatitis B vaccine (1 of 3 - Risk 3-dose series) Never done    COVID-19 Vaccine (3 - Moderna series) 04/08/2021    Diabetic Alb to Cr ratio (uACR) test  09/22/2023    Depression Monitoring  05/09/2024    GFR test (Diabetes, CKD 3-4, OR last GFR 15-59)  06/20/2024    A1C test (Diabetic or Prediabetic)  10/03/2024    Lipids  10/03/2024    Breast cancer screen  04/25/2025    Colorectal Cancer Screen  11/22/2025    DTaP/Tdap/Td vaccine (4 - Td or Tdap) 08/25/2032    Flu vaccine  Completed    Pneumococcal 0-64 years Vaccine  Completed    Hepatitis C screen  Completed    HIV screen  Completed    Hepatitis A vaccine  Aged Out    Hib vaccine  Aged Out    Meningococcal (ACWY) vaccine  Aged Out    Depression Screen  Discontinued       Hemoglobin A1C (%)   Date Value   10/03/2023 8.0   06/20/2023 9.0   04/11/2023 7.6 (H)             ( goal A1C is < 7)   No components found for: \"LABMICR\"  LDL Cholesterol (mg/dL)   Date Value   10/03/2023 60   06/24/2021 52       (goal LDL is <100)   AST (U/L)   Date Value   10/04/2022 35 (H)     ALT (U/L)   Date Value   10/04/2022 58 (H)     BUN (mg/dL)   Date Value   06/20/2023 23     BP Readings from Last 3 Encounters:   10/03/23 106/63   09/07/23 134/82   06/20/23 119/75          (goal 120/80)    All Future Testing planned in [facility]  Lab Frequency Next Occurrence   TSH With Reflex Ft4 Once 08/23/2023   Vitamin B12 & Folate Once 08/23/2023   Sleep Study with PAP Titration Once 08/24/2023               Patient Active

## 2023-11-06 RX ORDER — MAGNESIUM OXIDE 400 MG/1
400 TABLET ORAL DAILY
Qty: 30 TABLET | Refills: 3 | Status: SHIPPED | OUTPATIENT
Start: 2023-11-06

## 2023-11-06 RX ORDER — LISINOPRIL 40 MG/1
40 TABLET ORAL DAILY
Qty: 90 TABLET | Refills: 3 | Status: SHIPPED | OUTPATIENT
Start: 2023-11-06

## 2023-11-07 ENCOUNTER — PHARMACY VISIT (OUTPATIENT)
Dept: FAMILY MEDICINE CLINIC | Age: 63
End: 2023-11-07

## 2023-11-07 DIAGNOSIS — E11.22 TYPE 2 DIABETES MELLITUS WITH STAGE 2 CHRONIC KIDNEY DISEASE, WITHOUT LONG-TERM CURRENT USE OF INSULIN (HCC): Primary | ICD-10-CM

## 2023-11-07 DIAGNOSIS — N18.2 TYPE 2 DIABETES MELLITUS WITH STAGE 2 CHRONIC KIDNEY DISEASE, WITHOUT LONG-TERM CURRENT USE OF INSULIN (HCC): Primary | ICD-10-CM

## 2023-11-07 PROCEDURE — APPNB60 APP NON BILLABLE TIME 46-60 MINS: Performed by: PHARMACIST

## 2023-11-07 PROCEDURE — 99999 PR OFFICE/OUTPT VISIT,PROCEDURE ONLY: CPT | Performed by: PHARMACIST

## 2023-11-07 RX ORDER — GLIPIZIDE 5 MG/1
5 TABLET ORAL
Qty: 180 TABLET | Refills: 3 | Status: SHIPPED | OUTPATIENT
Start: 2023-11-07

## 2023-11-07 RX ORDER — DULAGLUTIDE 4.5 MG/.5ML
4.5 INJECTION, SOLUTION SUBCUTANEOUS WEEKLY
Qty: 2 ML | Refills: 1 | Status: SHIPPED | OUTPATIENT
Start: 2023-11-07

## 2023-11-07 NOTE — PROGRESS NOTES
down   Spoke with patient's PCP regarding pain/bursitis. PCP appt scheduled for next Tuesday (11/14/2023) for further evaluation. The following education was provided during today's visit:     [] Medication adherence   [] Insulin technique and storage   [x] Healthy lifestyle including diet and exercise changes   [x] Hypoglycemia symptoms and management   [] Blood glucose goals    [] Introduction to FreeStyle Clifford 2 and continuing glucose monitoring   [x] Interpreting Ambulatory Glucose Profile (AGP) Report with focus on page 1, average number of scans and glucose levels per day, and snapshot     Follow-Up:   PharmD 12/12/2023 @ 21  by telephone  PCP 11/14//2023 @ 1000    Future Considerations:   Continue to monitor closely for hypoglycemia & consider decreasing or d/c'ing glipizide if lows persist    Patient verbalized understanding of care plan. Patient advised to call Medication Management with any questions, concerns, or changes prior to next appointment. Progress note sent to referring provider. (Dr. Krystle Mauro MD)   Patient acknowledges working in a consult agreement with the pharmacist as referred by his/her physician. Alvaro Jean Baptiste, Pharm. D., [address] [facility] Medication Management Service  [phone]  11/7/2023  2:15 PM      ==================================================================    For Pharmacy Admin Tracking Only    Program: Medication Management  CPA in place:  Yes  Time Spent (min): 60

## 2023-11-08 NOTE — TELEPHONE ENCOUNTER
E-scribe request for cyclobenzaprine. Please review and e-scribe if applicable. Last Visit Date: 01/21/2021  Next Visit Date:  3/18/2021    Hemoglobin A1C (%)   Date Value   12/08/2020 7.9   08/04/2020 7.5   09/26/2019 7.4             ( goal A1C is < 7)   Microalb/Crt.  Ratio (mcg/mg creat)   Date Value   12/13/2019 CANNOT BE CALCULATED     LDL Cholesterol (mg/dL)   Date Value   08/04/2020 114       (goal LDL is <100)   AST (U/L)   Date Value   02/04/2021 18     ALT (U/L)   Date Value   02/04/2021 20     BUN (mg/dL)   Date Value   02/04/2021 25 (H)     BP Readings from Last 3 Encounters:   02/17/21 108/60   02/17/21 101/64   02/04/21 116/71          (goal 120/80)        Patient Active Problem List:     Cervical spondylosis     Type II or unspecified type diabetes mellitus without mention of complication, not stated as uncontrolled     Hypertension     Abnormal auditory perception     Eustachian tube dysfunction     Chronic serous otitis media     Nasal polyps     Nasal congestion     Osteoarthritis of left knee     Osteoarthritis of right knee     DIEGO (nonalcoholic steatohepatitis)     Vitamin D deficiency     Iron deficiency anemia     Dyslipidemia     Diabetes mellitus type 2, uncontrolled (HCC)     Left hip pain     Trochanteric bursitis     Fatigue     Daytime somnolence     Snoring     Chronic left shoulder pain     Restless legs syndrome     Generalized anxiety disorder     Primary osteoarthritis, left shoulder     Tendinopathy of left shoulder Products Recommended: Elta MD UV Clear and Brush on Block Detail Level: Detailed General Sunscreen Counseling: I recommended a broad spectrum sunscreen with a SPF of 30 or higher.  I explained that SPF 30 sunscreens block approximately 97 percent of the sun's harmful rays.  Sunscreens should be applied at least 15 minutes prior to expected sun exposure and then every 2 hours after that as long as sun exposure continues. If swimming or exercising sunscreen should be reapplied every 45 minutes to an hour after getting wet or sweating.  One ounce, or the equivalent of a shot glass full of sunscreen, is adequate to protect the skin not covered by a bathing suit. I also recommended a lip balm with a sunscreen as well. Sun protective clothing can be used in lieu of sunscreen but must be worn the entire time you are exposed to the sun's rays.

## 2023-11-14 ENCOUNTER — OFFICE VISIT (OUTPATIENT)
Dept: FAMILY MEDICINE CLINIC | Age: 63
End: 2023-11-14
Payer: COMMERCIAL

## 2023-11-14 ENCOUNTER — HOSPITAL ENCOUNTER (OUTPATIENT)
Age: 63
Setting detail: SPECIMEN
Discharge: HOME | End: 2023-11-14

## 2023-11-14 VITALS
HEIGHT: 62 IN | SYSTOLIC BLOOD PRESSURE: 126 MMHG | BODY MASS INDEX: 36.32 KG/M2 | DIASTOLIC BLOOD PRESSURE: 82 MMHG | HEART RATE: 84 BPM

## 2023-11-14 DIAGNOSIS — R25.1 TREMOR: Primary | ICD-10-CM

## 2023-11-14 DIAGNOSIS — I10 HYPERTENSION, UNSPECIFIED TYPE: ICD-10-CM

## 2023-11-14 DIAGNOSIS — R25.1 TREMOR: ICD-10-CM

## 2023-11-14 LAB
ANION GAP SERPL CALCULATED.3IONS-SCNC: 12 MMOL/L (ref 9–16)
BUN SERPL-MCNC: 17 MG/DL (ref 8–23)
CALCIUM SERPL-MCNC: 9.8 MG/DL (ref 8.6–10.4)
CHLORIDE SERPL-SCNC: 104 MMOL/L (ref 98–107)
CO2 SERPL-SCNC: 26 MMOL/L (ref 20–31)
CREAT SERPL-MCNC: 0.9 MG/DL (ref 0.5–0.9)
GFR SERPL CREATININE-BSD FRML MDRD: >60 ML/MIN/1.73M2
GLUCOSE SERPL-MCNC: 157 MG/DL (ref 74–99)
POTASSIUM SERPL-SCNC: 4.8 MMOL/L (ref 3.7–5.3)
SODIUM SERPL-SCNC: 142 MMOL/L (ref 136–145)
TSH SERPL DL<=0.05 MIU/L-ACNC: 2.2 UIU/ML (ref 0.27–4.2)

## 2023-11-14 PROCEDURE — 3074F SYST BP LT 130 MM HG: CPT | Performed by: FAMILY MEDICINE

## 2023-11-14 PROCEDURE — 3079F DIAST BP 80-89 MM HG: CPT | Performed by: FAMILY MEDICINE

## 2023-11-14 PROCEDURE — 99214 OFFICE O/P EST MOD 30 MIN: CPT | Performed by: FAMILY MEDICINE

## 2023-11-14 RX ORDER — LIDOCAINE 50 MG/G
1 PATCH TOPICAL DAILY
Qty: 30 PATCH | Refills: 0 | Status: SHIPPED | OUTPATIENT
Start: 2023-11-14 | End: 2023-12-14

## 2023-11-14 RX ORDER — PROPRANOLOL HYDROCHLORIDE 80 MG/1
80 CAPSULE, EXTENDED RELEASE ORAL DAILY
Qty: 30 CAPSULE | Refills: 1 | Status: SHIPPED | OUTPATIENT
Start: 2023-11-14

## 2023-11-14 SDOH — ECONOMIC STABILITY: FOOD INSECURITY: WITHIN THE PAST 12 MONTHS, THE FOOD YOU BOUGHT JUST DIDN'T LAST AND YOU DIDN'T HAVE MONEY TO GET MORE.: NEVER TRUE

## 2023-11-14 SDOH — ECONOMIC STABILITY: INCOME INSECURITY: HOW HARD IS IT FOR YOU TO PAY FOR THE VERY BASICS LIKE FOOD, HOUSING, MEDICAL CARE, AND HEATING?: NOT HARD AT ALL

## 2023-11-14 SDOH — ECONOMIC STABILITY: FOOD INSECURITY: WITHIN THE PAST 12 MONTHS, YOU WORRIED THAT YOUR FOOD WOULD RUN OUT BEFORE YOU GOT MONEY TO BUY MORE.: NEVER TRUE

## 2023-11-14 ASSESSMENT — ENCOUNTER SYMPTOMS
COUGH: 0
DIARRHEA: 0
ABDOMINAL PAIN: 0
SHORTNESS OF BREATH: 0
CONSTIPATION: 0

## 2023-11-14 NOTE — PATIENT INSTRUCTIONS
Thank you for letting us take care of you today. We hope all your questions were addressed. If a question was overlooked or something else comes to mind after you return home, please contact a member of your Care Team listed below. Your Care Team at [facility] is Team #1  Gus Carevr, Faculty Advisor  Ambar Marcelino, Resident Physician  Phan Andrade, Resident Physician  Andrey Arnold, Resident Physician  Kelsey España, [facility], [address] [facility], [address] [facility], [address]  [facility], [address] [facility], [facility]  [facility]  Beau Kennedy, [address] [facility]) Chris,   Shagufta Chauhan, [address] [facility] (Clinical Pharmacist)     Office phone number: [phone]    If you need to get in right away due to illness, please be advised we have \"Same Day\" appointments available Monday-Friday. Please call us at [phone] option #3 to schedule your \"Same Day\" appointment.

## 2023-11-14 NOTE — PROGRESS NOTES
Subjective:    Tomy Jane is a 59 y.o. female with  has a past medical history of Cervical spondylosis, CKD (chronic kidney disease), COVID-19, Fall, Generalized anxiety disorder, GERD (gastroesophageal reflux disease), History of recent hospitalization, History of swelling of feet, Hyperlipidemia, Hypertension, Left hand weakness, Lumbar radiculopathy, DIEGO (nonalcoholic steatohepatitis), Obesity, Osteoarthritis of left knee, Recurrent major depressive disorder ([address]), Restless legs syndrome, Snores, TIA (transient ischemic attack), Type II or unspecified type diabetes mellitus without mention of complication, not stated as uncontrolled, Wears glasses, and Wellness examination. Tomy Jane is an 59 y.o. female who presents for follow up of diabetes. Current symptoms include: none. Patient denies foot ulcerations, nausea, paresthesia of the feet, polydipsia, polyuria, visual disturbances, and weight loss. Evaluation to date has included: fasting blood sugar, fasting lipid panel, hemoglobin A1C, and microalbuminuria. Home sugars: patient does not check sugars. Current treatments: Continued sulfonylurea which has been Yes: , which has been dose decreased and trulicity dose was increased. Pharmacy follows with the patient. .. Last dilated eye exam 1 year ago. Vin Jane is a 59 y.o. right handed female referred for evaluation of tremor. Tremor primarily involves the right hand. Onset of symptoms was gradual, starting about 1 year ago. Symptoms are currently of mild severity. Tremor exacerbated by activity (intention). Tremor is alleviated by on its own. Symptoms occur in the evening and last non specific. She also describes symptoms of none.  She denies unilateral hand tremor, voice change, sleep disturbance, drooling during sleep (wet pillows), rigidity, postural changes, difficulty in initiating movement, change in handwriting, difficulty with walking, difficult with posture,

## 2023-11-14 NOTE — PROGRESS NOTES
Visit Information    Have you changed or started any medications since your last visit including any over-the-counter medicines, vitamins, or herbal medicines? no   Have you stopped taking any of your medications? Is so, why? -  no  Are you having any side effects from any of your medications? - no    Have you seen any other physician or provider since your last visit?  no   Have you had any other diagnostic tests since your last visit? Labs, [place]  Have you been seen in the emergency room and/or had an admission in a hospital since we last saw you?  no   Have you had your routine dental cleaning in the past 6 months?  no     Do you have an active MyChart account? If no, what is the barrier?   Yes    Patient Care Team:  Carin Acosta MD as PCP - General (Family Medicine)  Carin Acosta MD as PCP - Empaneled Provider  Arielle Hudson MD as Consulting Physician (Gastroenterology)  MD Pati Gupta MD as Orthopedic Surgeon  Stephenie Caballero, [facility] as Pharmacist (Pharmacist)  Madelin Mariscal [facility] as Pharmacist (Pharmacy)    Medical History Review  Past Medical, Family, and Social History reviewed and does not contribute to the patient presenting condition    Health Maintenance   Topic Date Due    Shingles vaccine (1 of 2) Never done    Diabetic retinal exam  01/12/2017    Diabetic foot exam  12/01/2017    Hepatitis B vaccine (1 of 3 - Risk 3-dose series) Never done    COVID-19 Vaccine (3 - 2023-24 season) 09/01/2023    Diabetic Alb to Cr ratio (uACR) test  09/22/2023    Depression Monitoring  05/09/2024    GFR test (Diabetes, CKD 3-4, OR last GFR 15-59)  06/20/2024    A1C test (Diabetic or Prediabetic)  10/03/2024    Lipids  10/03/2024    Breast cancer screen  04/25/2025    Colorectal Cancer Screen  11/22/2025    DTaP/Tdap/Td vaccine (4 - Td or Tdap) 08/25/2032    Flu vaccine  Completed    Pneumococcal 0-64 years Vaccine  Completed    Hepatitis C screen  Completed    HIV screen  Completed

## 2023-11-22 RX ORDER — FUROSEMIDE 20 MG/1
20 TABLET ORAL DAILY
Qty: 90 TABLET | Refills: 3 | Status: SHIPPED | OUTPATIENT
Start: 2023-11-22

## 2023-11-22 RX ORDER — BUSPIRONE HYDROCHLORIDE 10 MG/1
10 TABLET ORAL 3 TIMES DAILY
Qty: 90 TABLET | Refills: 3 | Status: SHIPPED | OUTPATIENT
Start: 2023-11-22

## 2023-11-28 ENCOUNTER — TELEPHONE (OUTPATIENT)
Dept: FAMILY MEDICINE CLINIC | Age: 63
End: 2023-11-28

## 2023-11-28 DIAGNOSIS — E11.22 TYPE 2 DIABETES MELLITUS WITH STAGE 2 CHRONIC KIDNEY DISEASE, WITHOUT LONG-TERM CURRENT USE OF INSULIN (HCC): ICD-10-CM

## 2023-11-28 DIAGNOSIS — N18.2 TYPE 2 DIABETES MELLITUS WITH STAGE 2 CHRONIC KIDNEY DISEASE, WITHOUT LONG-TERM CURRENT USE OF INSULIN (HCC): ICD-10-CM

## 2023-11-28 RX ORDER — DULAGLUTIDE 4.5 MG/.5ML
4.5 INJECTION, SOLUTION SUBCUTANEOUS WEEKLY
Qty: 6 ML | Refills: 3 | Status: SHIPPED | OUTPATIENT
Start: 2023-11-28

## 2023-11-28 NOTE — TELEPHONE ENCOUNTER
Patient called office asking if all medications can be 90 day scripts due to insurance purposes. Also, patient asked about the Voltaren Gel and writer spoke to a couple pharmacies and that medication is still on backorder and is unable to be filled through the pharmacy. This is an OTC medication as well. Patient asked if something else can be sent to the pharmacy for her in replace of the gel. During the documentation of this call, patient called back and requested a refill on the Trulicity 6.1OF/2.4WI. she is asking for this to be a 90 supply.

## 2024-02-02 RX ORDER — PROPRANOLOL HYDROCHLORIDE 80 MG/1
80 CAPSULE, EXTENDED RELEASE ORAL DAILY
Qty: 30 CAPSULE | Refills: 1 | Status: SHIPPED | OUTPATIENT
Start: 2024-02-02

## 2024-02-02 NOTE — TELEPHONE ENCOUNTER
E-scribe request for inderal LA . Please review and e-scribe if applicable.     Last Visit Date:  11/14/23  Next Visit Date:  Visit date not found    Hemoglobin A1C (%)   Date Value   10/03/2023 8.0   06/20/2023 9.0   04/11/2023 7.6 (H)             ( goal A1C is < 7)   No components found for: \"LABMICR\"  LDL Cholesterol (mg/dL)   Date Value   10/03/2023 60       (goal LDL is <100)   AST (U/L)   Date Value   10/04/2022 35 (H)     ALT (U/L)   Date Value   10/04/2022 58 (H)     BUN (mg/dL)   Date Value   11/14/2023 17     BP Readings from Last 3 Encounters:   11/14/23 126/82   10/03/23 106/63   09/07/23 134/82          (goal 120/80)        Patient Active Problem List:     Cervical spondylosis     Type 2 diabetes mellitus without complication, without long-term current use of insulin (HCC)     Hypertension     Abnormal auditory perception     Nasal polyps     Osteoarthritis of left knee     Osteoarthritis of right knee     DIEGO (nonalcoholic steatohepatitis)     Vitamin D deficiency     Iron deficiency anemia     Dyslipidemia     Trochanteric bursitis     Chronic left shoulder pain     Restless legs syndrome     Generalized anxiety disorder     Toxic metabolic encephalopathy     Class 2 obesity in adult     Leg edema     Recurrent major depressive disorder (HCC)     Dermatitis     Retrolisthesis of vertebrae     Bilateral carpal tunnel syndrome     Intention tremor     Sciatica     Back pain with radiation     Left sciatic nerve pain     Lumbar disc disease with radiculopathy     Intractable back pain     Piriformis syndrome of left side     Ulnar neuropathy at elbow, left     S/P decompression of ulnar nerve at elbow     Cervical myelopathy with cervical radiculopathy (HCC)     Lumbar spondylosis     Right median nerve neuropathy     Sacroiliitis (HCC)     Secondary diabetes mellitus with stage 2 chronic kidney disease (GFR 60-89) (HCC)     Benign hypertension with CKD (chronic kidney disease) stage III (HCC)

## 2024-02-05 RX ORDER — OMEPRAZOLE 20 MG/1
20 CAPSULE, DELAYED RELEASE ORAL 2 TIMES DAILY
Qty: 120 CAPSULE | Refills: 1 | Status: SHIPPED | OUTPATIENT
Start: 2024-02-05

## 2024-02-05 NOTE — TELEPHONE ENCOUNTER
Please address the medication refill and close the encounter.  If I can be of assistance, please route to the applicable pool.      Thank you.    Last visit: 11-14-23  Last Med refill: 10-3-23  Does patient have enough medication for 72 hours: No:     Next Visit Date:  No future appointments.    Health Maintenance   Topic Date Due    Shingles vaccine (1 of 2) Never done    Diabetic retinal exam  01/12/2017    Diabetic foot exam  12/01/2017    Respiratory Syncytial Virus (RSV) Pregnant or age 60 yrs+ (1 - 1-dose 60+ series) Never done    COVID-19 Vaccine (3 - 2023-24 season) 09/01/2023    Diabetic Alb to Cr ratio (uACR) test  09/22/2023    Depression Monitoring  05/09/2024    A1C test (Diabetic or Prediabetic)  10/03/2024    Lipids  10/03/2024    GFR test (Diabetes, CKD 3-4, OR last GFR 15-59)  11/14/2024    Breast cancer screen  04/25/2025    Colorectal Cancer Screen  11/22/2025    DTaP/Tdap/Td vaccine (4 - Td or Tdap) 08/25/2032    Flu vaccine  Completed    Pneumococcal 0-64 years Vaccine  Completed    Hepatitis C screen  Completed    HIV screen  Completed    Hepatitis A vaccine  Aged Out    Hepatitis B vaccine  Aged Out    Hib vaccine  Aged Out    Polio vaccine  Aged Out    Meningococcal (ACWY) vaccine  Aged Out    Depression Screen  Discontinued       Hemoglobin A1C (%)   Date Value   10/03/2023 8.0   06/20/2023 9.0   04/11/2023 7.6 (H)             ( goal A1C is < 7)   No components found for: \"LABMICR\"  LDL Cholesterol (mg/dL)   Date Value   10/03/2023 60   06/24/2021 52       (goal LDL is <100)   AST (U/L)   Date Value   10/04/2022 35 (H)     ALT (U/L)   Date Value   10/04/2022 58 (H)     BUN (mg/dL)   Date Value   11/14/2023 17     BP Readings from Last 3 Encounters:   11/14/23 126/82   10/03/23 106/63   09/07/23 134/82          (goal 120/80)    All Future Testing planned in [facility]  Lab Frequency Next Occurrence   TSH With Reflex Ft4 Once 08/23/2023   Vitamin B12 & Folate Once 08/23/2023   Sleep Study with

## 2024-02-26 RX ORDER — VENLAFAXINE HYDROCHLORIDE 37.5 MG/1
75 CAPSULE, EXTENDED RELEASE ORAL DAILY
Qty: 90 CAPSULE | Refills: 3 | Status: SHIPPED | OUTPATIENT
Start: 2024-02-26

## 2024-02-26 NOTE — TELEPHONE ENCOUNTER
without long-term current use of insulin (HCC)     Hypertension     Abnormal auditory perception     Nasal polyps     Osteoarthritis of left knee     Osteoarthritis of right knee     DIEGO (nonalcoholic steatohepatitis)     Vitamin D deficiency     Iron deficiency anemia     Dyslipidemia     Trochanteric bursitis     Chronic left shoulder pain     Restless legs syndrome     Generalized anxiety disorder     Toxic metabolic encephalopathy     Class 2 obesity in adult     Leg edema     Recurrent major depressive disorder (HCC)     Dermatitis     Retrolisthesis of vertebrae     Bilateral carpal tunnel syndrome     Intention tremor     Sciatica     Back pain with radiation     Left sciatic nerve pain     Lumbar disc disease with radiculopathy     Intractable back pain     Piriformis syndrome of left side     Ulnar neuropathy at elbow, left     S/P decompression of ulnar nerve at elbow     Cervical myelopathy with cervical radiculopathy (HCC)     Lumbar spondylosis     Right median nerve neuropathy     Sacroiliitis (HCC)     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 obesity (BMI 35.0-39.9) with comorbidity (HCC)     Stenosis of lateral recess of lumbosacral spine     Type 2 diabetes mellitus with chronic kidney disease     Sacroiliac joint pain

## 2024-03-07 NOTE — TELEPHONE ENCOUNTER
Last visit: 11/14/2023  Last Med refill: 05/09/2023  Does patient have enough medication for 72 hours: No:     Next Visit Date:  No future appointments.    Health Maintenance   Topic Date Due    Shingles vaccine (1 of 2) Never done    Diabetic retinal exam  01/12/2017    Diabetic foot exam  12/01/2017    Respiratory Syncytial Virus (RSV) Pregnant or age 60 yrs+ (1 - 1-dose 60+ series) Never done    COVID-19 Vaccine (3 - 2023-24 season) 09/01/2023    Diabetic Alb to Cr ratio (uACR) test  09/22/2023    Depression Monitoring  05/09/2024    A1C test (Diabetic or Prediabetic)  10/03/2024    Lipids  10/03/2024    GFR test (Diabetes, CKD 3-4, OR last GFR 15-59)  11/14/2024    Breast cancer screen  04/25/2025    Colorectal Cancer Screen  11/22/2025    DTaP/Tdap/Td vaccine (4 - Td or Tdap) 08/25/2032    Flu vaccine  Completed    Pneumococcal 0-64 years Vaccine  Completed    Hepatitis C screen  Completed    HIV screen  Completed    Hepatitis A vaccine  Aged Out    Hepatitis B vaccine  Aged Out    Hib vaccine  Aged Out    Polio vaccine  Aged Out    Meningococcal (ACWY) vaccine  Aged Out    Depression Screen  Discontinued       Hemoglobin A1C (%)   Date Value   10/03/2023 8.0   06/20/2023 9.0   04/11/2023 7.6 (H)             ( goal A1C is < 7)   No components found for: \"LABMICR\"  LDL Cholesterol (mg/dL)   Date Value   10/03/2023 60   06/24/2021 52       (goal LDL is <100)   AST (U/L)   Date Value   10/04/2022 35 (H)     ALT (U/L)   Date Value   10/04/2022 58 (H)     BUN (mg/dL)   Date Value   11/14/2023 17     BP Readings from Last 3 Encounters:   11/14/23 126/82   10/03/23 106/63   09/07/23 134/82          (goal 120/80)    All Future Testing planned in [facility]  Lab Frequency Next Occurrence   TSH With Reflex Ft4 Once 08/23/2023   Vitamin B12 & Folate Once 08/23/2023   Sleep Study with PAP Titration Once 08/24/2023               Patient Active Problem List:     Cervical spondylosis     Type 2 diabetes mellitus without

## 2024-03-11 RX ORDER — EMPAGLIFLOZIN AND METFORMIN HYDROCHLORIDE 5; 1000 MG/1; MG/1
1 TABLET ORAL 2 TIMES DAILY
Qty: 180 TABLET | Refills: 2 | Status: SHIPPED | OUTPATIENT
Start: 2024-03-11 | End: 2024-03-12 | Stop reason: SDUPTHER

## 2024-03-12 ENCOUNTER — TELEPHONE (OUTPATIENT)
Dept: FAMILY MEDICINE CLINIC | Age: 64
End: 2024-03-12

## 2024-03-12 RX ORDER — EMPAGLIFLOZIN AND METFORMIN HYDROCHLORIDE 5; 1000 MG/1; MG/1
1 TABLET ORAL 2 TIMES DAILY
Qty: 180 TABLET | Refills: 2 | Status: SHIPPED | OUTPATIENT
Start: 2024-03-12

## 2024-03-12 NOTE — TELEPHONE ENCOUNTER
Patient called stating she no longer has health insurance.  Patient would like to know if you could send a script for Jardiance and a script for metformin, due to the cost being lower for each script.  Please advise and thank you.    Electrodesiccation And Curettage Text: The wound bed was treated with electrodesiccation and curettage after the biopsy was performed. Curettage Text: The wound bed was treated with curettage after the biopsy was performed. Detail Level: Detailed Cryotherapy Text: The wound bed was treated with cryotherapy after the biopsy was performed. Biopsy Type: H and E Bill [id number] For Specimen Handling/Conveyance To Laboratory?: no X Size Of Lesion In Cm: 0 Silver Nitrate Text: The wound bed was treated with silver nitrate after the biopsy was performed. Depth Of Biopsy: dermis Was A Bandage Applied: Yes Anesthesia Type: 1% lidocaine without epinephrine Lab Facility: [id number] Billing Type: Third-Party Bill Biopsy Method: Personna blade Anesthesia Volume In Cc: 0.5 Post-Care Instructions: I reviewed with the patient in detail post-care instructions. Patient is to wear sun protection, and avoid picking at any of the treated lesions. Pt was advised to wash daily with gentle soap and water and may apply Vaseline to crusted or scabbing areas. Wound Care: No ointment Electrodesiccation Text: The wound bed was treated with electrodesiccation after the biopsy was performed. Dressing: bandage Body Location Override (Optional - Billing Will Still Be Based On Selected Body Map Location If Applicable): left chin Type Of Destruction Used: Curettage Notification Instructions: Results will be faxed to PCP once they are available. Hemostasis: Aluminum Chloride Lab: [id number]

## 2024-03-13 RX ORDER — ROPINIROLE 2 MG/1
4 TABLET, FILM COATED ORAL NIGHTLY
Qty: 90 TABLET | Refills: 3 | Status: SHIPPED | OUTPATIENT
Start: 2024-03-13

## 2024-03-13 NOTE — TELEPHONE ENCOUNTER
Last visit: 11/14/23  Last Med refill: 6/2/23  Does patient have enough medication for 72 hours: No:     Next Visit Date:  No future appointments.    Health Maintenance   Topic Date Due    Shingles vaccine (1 of 2) Never done    Diabetic retinal exam  01/12/2017    Diabetic foot exam  12/01/2017    Respiratory Syncytial Virus (RSV) Pregnant or age 60 yrs+ (1 - 1-dose 60+ series) Never done    COVID-19 Vaccine (3 - 2023-24 season) 09/01/2023    Diabetic Alb to Cr ratio (uACR) test  09/22/2023    Depression Monitoring  05/09/2024    A1C test (Diabetic or Prediabetic)  10/03/2024    Lipids  10/03/2024    GFR test (Diabetes, CKD 3-4, OR last GFR 15-59)  11/14/2024    Breast cancer screen  04/25/2025    Colorectal Cancer Screen  11/22/2025    DTaP/Tdap/Td vaccine (4 - Td or Tdap) 08/25/2032    Flu vaccine  Completed    Pneumococcal 0-64 years Vaccine  Completed    Hepatitis C screen  Completed    HIV screen  Completed    Hepatitis A vaccine  Aged Out    Hepatitis B vaccine  Aged Out    Hib vaccine  Aged Out    Polio vaccine  Aged Out    Meningococcal (ACWY) vaccine  Aged Out    Depression Screen  Discontinued       Hemoglobin A1C (%)   Date Value   10/03/2023 8.0   06/20/2023 9.0   04/11/2023 7.6 (H)             ( goal A1C is < 7)   No components found for: \"LABMICR\"  LDL Cholesterol (mg/dL)   Date Value   10/03/2023 60   06/24/2021 52       (goal LDL is <100)   AST (U/L)   Date Value   10/04/2022 35 (H)     ALT (U/L)   Date Value   10/04/2022 58 (H)     BUN (mg/dL)   Date Value   11/14/2023 17     BP Readings from Last 3 Encounters:   11/14/23 126/82   10/03/23 106/63   09/07/23 134/82          (goal 120/80)    All Future Testing planned in [facility]  Lab Frequency Next Occurrence   TSH With Reflex Ft4 Once 08/23/2023   Vitamin B12 & Folate Once 08/23/2023   Sleep Study with PAP Titration Once 08/24/2023               Patient Active Problem List:     Cervical spondylosis     Type 2 diabetes mellitus without complication,

## 2024-03-28 ENCOUNTER — TELEPHONE (OUTPATIENT)
Dept: FAMILY MEDICINE CLINIC | Age: 64
End: 2024-03-28

## 2024-03-28 DIAGNOSIS — G56.01 CARPAL TUNNEL SYNDROME OF RIGHT WRIST: ICD-10-CM

## 2024-03-28 NOTE — TELEPHONE ENCOUNTER
Writer received LTD for [email or web address] on 3/22/2024. Scanned into the chart blank. Writer will give to Dr. Hamilton next time she is in office.

## 2024-03-29 NOTE — TELEPHONE ENCOUNTER
E-scribe request for atorvastatin pending for refill. Please review and e-scribe if applicable.     Last Visit Date:  11/14/23  Next Visit Date:  Visit date not found    Hemoglobin A1C (%)   Date Value   10/03/2023 8.0   06/20/2023 9.0   04/11/2023 7.6 (H)             ( goal A1C is < 7)   No components found for: \"LABMICR\"  LDL Cholesterol (mg/dL)   Date Value   10/03/2023 60       (goal LDL is <100)   AST (U/L)   Date Value   10/04/2022 35 (H)     ALT (U/L)   Date Value   10/04/2022 58 (H)     BUN (mg/dL)   Date Value   11/14/2023 17     BP Readings from Last 3 Encounters:   11/14/23 126/82   10/03/23 106/63   09/07/23 134/82          (goal 120/80)        Patient Active Problem List:     Cervical spondylosis     Type 2 diabetes mellitus without complication, without long-term current use of insulin (HCC)     Hypertension     Abnormal auditory perception     Nasal polyps     Osteoarthritis of left knee     Osteoarthritis of right knee     DIEGO (nonalcoholic steatohepatitis)     Vitamin D deficiency     Iron deficiency anemia     Dyslipidemia     Trochanteric bursitis     Chronic left shoulder pain     Restless legs syndrome     Generalized anxiety disorder     Toxic metabolic encephalopathy     Class 2 obesity in adult     Leg edema     Recurrent major depressive disorder (HCC)     Dermatitis     Retrolisthesis of vertebrae     Bilateral carpal tunnel syndrome     Intention tremor     Sciatica     Back pain with radiation     Left sciatic nerve pain     Lumbar disc disease with radiculopathy     Intractable back pain     Piriformis syndrome of left side     Ulnar neuropathy at elbow, left     S/P decompression of ulnar nerve at elbow     Cervical myelopathy with cervical radiculopathy (HCC)     Lumbar spondylosis     Right median nerve neuropathy     Sacroiliitis (HCC)     Secondary diabetes mellitus with stage 2 chronic kidney disease (GFR 60-89) (HCC)     Benign hypertension with CKD (chronic kidney disease) stage

## 2024-04-01 RX ORDER — ATORVASTATIN CALCIUM 40 MG/1
40 TABLET, FILM COATED ORAL NIGHTLY
Qty: 90 TABLET | Refills: 2 | Status: SHIPPED | OUTPATIENT
Start: 2024-04-01

## 2024-04-05 RX ORDER — PROPRANOLOL HYDROCHLORIDE 80 MG/1
80 CAPSULE, EXTENDED RELEASE ORAL DAILY
Qty: 30 CAPSULE | Refills: 1 | Status: SHIPPED | OUTPATIENT
Start: 2024-04-05

## 2024-04-05 NOTE — TELEPHONE ENCOUNTER
E-scribe request for PROPANOLOL. Please review and e-scribe if applicable.     Last Visit Date:  11/14/23  Next Visit Date:  Visit date not found    Hemoglobin A1C (%)   Date Value   10/03/2023 8.0   06/20/2023 9.0   04/11/2023 7.6 (H)             ( goal A1C is < 7)   No components found for: \"LABMICR\"  LDL Cholesterol (mg/dL)   Date Value   10/03/2023 60       (goal LDL is <100)   AST (U/L)   Date Value   10/04/2022 35 (H)     ALT (U/L)   Date Value   10/04/2022 58 (H)     BUN (mg/dL)   Date Value   11/14/2023 17     BP Readings from Last 3 Encounters:   11/14/23 126/82   10/03/23 106/63   09/07/23 134/82          (goal 120/80)        Patient Active Problem List:     Cervical spondylosis     Type 2 diabetes mellitus without complication, without long-term current use of insulin (HCC)     Hypertension     Abnormal auditory perception     Nasal polyps     Osteoarthritis of left knee     Osteoarthritis of right knee     DIEGO (nonalcoholic steatohepatitis)     Vitamin D deficiency     Iron deficiency anemia     Dyslipidemia     Trochanteric bursitis     Chronic left shoulder pain     Restless legs syndrome     Generalized anxiety disorder     Toxic metabolic encephalopathy     Class 2 obesity in adult     Leg edema     Recurrent major depressive disorder (HCC)     Dermatitis     Retrolisthesis of vertebrae     Bilateral carpal tunnel syndrome     Intention tremor     Sciatica     Back pain with radiation     Left sciatic nerve pain     Lumbar disc disease with radiculopathy     Intractable back pain     Piriformis syndrome of left side     Ulnar neuropathy at elbow, left     S/P decompression of ulnar nerve at elbow     Cervical myelopathy with cervical radiculopathy (HCC)     Lumbar spondylosis     Right median nerve neuropathy     Sacroiliitis (HCC)     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

## 2024-04-10 NOTE — TELEPHONE ENCOUNTER
Writer called patient again to see why she needs the STD forms filled out. Due to back, left hand, left leg, toes are numb, can't sit or stand for long time, amadou does not work (can't find the words to say). Feels weak all the time.

## 2024-04-16 NOTE — TELEPHONE ENCOUNTER
Writer spoke with Dr. Hamilton and gave her all Kavya's reasons for the STD forms. Per Dr. Hamliton she would like the patient to have a Functional Capacity Test before Dr. Hamilton will fill out forms. Writer called patient to inform her of this. Patient states that she does not have an insurance right now. Writer suggested calling around to find one that not too expensive. Patient stated she would let me know.

## 2024-04-19 ENCOUNTER — TELEPHONE (OUTPATIENT)
Dept: FAMILY MEDICINE CLINIC | Age: 64
End: 2024-04-19

## 2024-04-19 NOTE — TELEPHONE ENCOUNTER
PC from patient stating that they still have yet to get anything for their Diaberes from the pharmacy as the Synjardy script sent 3/12/24 costs hundreds of dollars and the pt couldn't afford such. Pt states pharmacy was supposed to reach out to office. Pt does state Blood sugar levels have been in the 400s. Appt scheduled with PCP 4/30/24.

## 2024-04-22 ENCOUNTER — TELEPHONE (OUTPATIENT)
Dept: FAMILY MEDICINE CLINIC | Age: 64
End: 2024-04-22

## 2024-04-22 RX ORDER — BUSPIRONE HYDROCHLORIDE 10 MG/1
10 TABLET ORAL 3 TIMES DAILY
Qty: 90 TABLET | Refills: 3 | Status: SHIPPED | OUTPATIENT
Start: 2024-04-22

## 2024-04-22 NOTE — TELEPHONE ENCOUNTER
Last visit: 11/14/2023  Last Med refill: 11/22/2023  Does patient have enough medication for 72 hours: No:     Next Visit Date:  Future Appointments   Date Time Provider Department Center   4/30/2024 10:30 AM Shaina Hamilton MD [id number]       Health Maintenance   Topic Date Due    Shingles vaccine (1 of 2) Never done    Diabetic retinal exam  01/12/2017    Diabetic foot exam  12/01/2017    Respiratory Syncytial Virus (RSV) Pregnant or age 60 yrs+ (1 - 1-dose 60+ series) Never done    COVID-19 Vaccine (3 - 2023-24 season) 09/01/2023    Diabetic Alb to Cr ratio (uACR) test  09/22/2023    Depression Monitoring  05/09/2024    A1C test (Diabetic or Prediabetic)  10/03/2024    Lipids  10/03/2024    GFR test (Diabetes, CKD 3-4, OR last GFR 15-59)  11/14/2024    Breast cancer screen  04/25/2025    Colorectal Cancer Screen  11/22/2025    DTaP/Tdap/Td vaccine (4 - Td or Tdap) 08/25/2032    Flu vaccine  Completed    Pneumococcal 0-64 years Vaccine  Completed    Hepatitis C screen  Completed    HIV screen  Completed    Hepatitis A vaccine  Aged Out    Hepatitis B vaccine  Aged Out    Hib vaccine  Aged Out    Polio vaccine  Aged Out    Meningococcal (ACWY) vaccine  Aged Out    Depression Screen  Discontinued       Hemoglobin A1C (%)   Date Value   10/03/2023 8.0   06/20/2023 9.0   04/11/2023 7.6 (H)             ( goal A1C is < 7)   No components found for: \"LABMICR\"  LDL Cholesterol (mg/dL)   Date Value   10/03/2023 60   06/24/2021 52       (goal LDL is <100)   AST (U/L)   Date Value   10/04/2022 35 (H)     ALT (U/L)   Date Value   10/04/2022 58 (H)     BUN (mg/dL)   Date Value   11/14/2023 17     BP Readings from Last 3 Encounters:   11/14/23 126/82   10/03/23 106/63   09/07/23 134/82          (goal 120/80)    All Future Testing planned in [facility]  Lab Frequency Next Occurrence   TSH With Reflex Ft4 Once 08/23/2023   Vitamin B12 & Folate Once 08/23/2023   Sleep Study with PAP Titration Once 08/24/2023

## 2024-04-22 NOTE — TELEPHONE ENCOUNTER
Writer called patient to instruct her to start taking Glipizide 5mg take 2 tablets 2 times daily and that a new script was sent to her pharmacy for Metformin 1000mg take 1 tablet 2 times daily. Patient acknowledged understanding. Patient then asked if PCP would fill out her leave papers without her having her Functional Capacity Test  done. Patient said now that they have cut her money off and not having insurance, she can't afford to do the test. Writer explained to patient, that PCP said she needs to have it done prior to the forms being filled out. Patient was told she will need to discuss this with her PCP at next appt.

## 2024-04-26 ENCOUNTER — TELEPHONE (OUTPATIENT)
Dept: FAMILY MEDICINE CLINIC | Age: 64
End: 2024-04-26

## 2024-04-26 NOTE — TELEPHONE ENCOUNTER
Medication Management Service ([facility])  [facility]  [phone]     CLINICAL PHARMACY NOTE:    Shaina Hamilton MD requested writer to reach out to patient to follow up on blood glucose readings.  Patient recently had a change in insurance and is without coverage.  Patient had been instructed to take glipizide 5mg (2 tablets daily) and a new prescription was sent to the pharmacy for metformin 1000mg twice daily.      Blood glucose had previously been managed with Trulicity, glipizide, and Synjardy.  With last medication management visit on 11/07/2023, patient was following glucose with the Clifford 3 sensor with phone as reader.  At that time, TIR was 76%.      Phone call to patient.  LVM to return writer's phone call ([phone]).  Patient is scheduled to see PCP on Tuesday, 04/30/2024.  Depending on insurance status, plans for new coverage, and household income, patient may be eligible for PAP through  for Synjardy.  Could also consider [employer] for Ozempic. [employer] is not accepting enrollment for Trulicity due to the supply issues.     Patricia March, Pharm.D., BCACP  Clinical Pharmacist  [facility] Medication Management Service  [phone]  4/26/2024  4:14 PM    =========================================================    For Pharmacy Admin Tracking Only    Program: Medical Group  CPA in place:  Yes  Intervention Detail: Patient Access Assistance/Sample Provided  Time Spent (min): 20

## 2024-04-30 ENCOUNTER — OFFICE VISIT (OUTPATIENT)
Dept: FAMILY MEDICINE CLINIC | Age: 64
End: 2024-04-30

## 2024-04-30 ENCOUNTER — TELEPHONE (OUTPATIENT)
Dept: FAMILY MEDICINE CLINIC | Age: 64
End: 2024-04-30

## 2024-04-30 VITALS
DIASTOLIC BLOOD PRESSURE: 64 MMHG | HEART RATE: 81 BPM | SYSTOLIC BLOOD PRESSURE: 106 MMHG | BODY MASS INDEX: 37.1 KG/M2 | WEIGHT: 201.6 LBS | HEIGHT: 62 IN

## 2024-04-30 DIAGNOSIS — M46.1 SACROILIITIS (HCC): ICD-10-CM

## 2024-04-30 DIAGNOSIS — E78.5 DYSLIPIDEMIA: ICD-10-CM

## 2024-04-30 DIAGNOSIS — M54.12 CERVICAL MYELOPATHY WITH CERVICAL RADICULOPATHY (HCC): ICD-10-CM

## 2024-04-30 DIAGNOSIS — E11.9 TYPE 2 DIABETES MELLITUS WITHOUT COMPLICATION, WITHOUT LONG-TERM CURRENT USE OF INSULIN (HCC): ICD-10-CM

## 2024-04-30 DIAGNOSIS — N18.30 BENIGN HYPERTENSION WITH CKD (CHRONIC KIDNEY DISEASE) STAGE III (HCC): ICD-10-CM

## 2024-04-30 DIAGNOSIS — E11.65 TYPE 2 DIABETES MELLITUS WITH HYPERGLYCEMIA, WITHOUT LONG-TERM CURRENT USE OF INSULIN (HCC): ICD-10-CM

## 2024-04-30 DIAGNOSIS — F33.0 MILD EPISODE OF RECURRENT MAJOR DEPRESSIVE DISORDER (HCC): ICD-10-CM

## 2024-04-30 DIAGNOSIS — N18.2 TYPE 2 DIABETES MELLITUS WITH STAGE 2 CHRONIC KIDNEY DISEASE, WITHOUT LONG-TERM CURRENT USE OF INSULIN (HCC): ICD-10-CM

## 2024-04-30 DIAGNOSIS — E11.22 TYPE 2 DIABETES MELLITUS WITH STAGE 2 CHRONIC KIDNEY DISEASE, WITHOUT LONG-TERM CURRENT USE OF INSULIN (HCC): ICD-10-CM

## 2024-04-30 DIAGNOSIS — I12.9 BENIGN HYPERTENSION WITH CKD (CHRONIC KIDNEY DISEASE) STAGE III (HCC): ICD-10-CM

## 2024-04-30 DIAGNOSIS — R10.13 EPIGASTRIC PAIN: Primary | ICD-10-CM

## 2024-04-30 DIAGNOSIS — G95.9 CERVICAL MYELOPATHY WITH CERVICAL RADICULOPATHY (HCC): ICD-10-CM

## 2024-04-30 DIAGNOSIS — E66.01 SEVERE OBESITY (BMI 35.0-39.9) WITH COMORBIDITY (HCC): ICD-10-CM

## 2024-04-30 PROCEDURE — 3078F DIAST BP <80 MM HG: CPT | Performed by: FAMILY MEDICINE

## 2024-04-30 PROCEDURE — 3074F SYST BP LT 130 MM HG: CPT | Performed by: FAMILY MEDICINE

## 2024-04-30 PROCEDURE — 99213 OFFICE O/P EST LOW 20 MIN: CPT | Performed by: FAMILY MEDICINE

## 2024-04-30 RX ORDER — VENLAFAXINE HYDROCHLORIDE 75 MG/1
75 CAPSULE, EXTENDED RELEASE ORAL DAILY
Qty: 90 CAPSULE | Refills: 3 | Status: SHIPPED | OUTPATIENT
Start: 2024-04-30

## 2024-04-30 ASSESSMENT — PATIENT HEALTH QUESTIONNAIRE - PHQ9
6. FEELING BAD ABOUT YOURSELF - OR THAT YOU ARE A FAILURE OR HAVE LET YOURSELF OR YOUR FAMILY DOWN: MORE THAN HALF THE DAYS
5. POOR APPETITE OR OVEREATING: MORE THAN HALF THE DAYS
3. TROUBLE FALLING OR STAYING ASLEEP: NEARLY EVERY DAY
10. IF YOU CHECKED OFF ANY PROBLEMS, HOW DIFFICULT HAVE THESE PROBLEMS MADE IT FOR YOU TO DO YOUR WORK, TAKE CARE OF THINGS AT HOME, OR GET ALONG WITH OTHER PEOPLE: SOMEWHAT DIFFICULT
9. THOUGHTS THAT YOU WOULD BE BETTER OFF DEAD, OR OF HURTING YOURSELF: NOT AT ALL
SUM OF ALL RESPONSES TO PHQ QUESTIONS 1-9: 15
SUM OF ALL RESPONSES TO PHQ9 QUESTIONS 1 & 2: 4
2. FEELING DOWN, DEPRESSED OR HOPELESS: MORE THAN HALF THE DAYS
SUM OF ALL RESPONSES TO PHQ QUESTIONS 1-9: 15
4. FEELING TIRED OR HAVING LITTLE ENERGY: NEARLY EVERY DAY
SUM OF ALL RESPONSES TO PHQ QUESTIONS 1-9: 15
7. TROUBLE CONCENTRATING ON THINGS, SUCH AS READING THE NEWSPAPER OR WATCHING TELEVISION: SEVERAL DAYS
8. MOVING OR SPEAKING SO SLOWLY THAT OTHER PEOPLE COULD HAVE NOTICED. OR THE OPPOSITE, BEING SO FIGETY OR RESTLESS THAT YOU HAVE BEEN MOVING AROUND A LOT MORE THAN USUAL: NOT AT ALL
SUM OF ALL RESPONSES TO PHQ QUESTIONS 1-9: 15
1. LITTLE INTEREST OR PLEASURE IN DOING THINGS: MORE THAN HALF THE DAYS

## 2024-04-30 ASSESSMENT — ENCOUNTER SYMPTOMS
SHORTNESS OF BREATH: 0
CHEST TIGHTNESS: 0
DIARRHEA: 0
CONSTIPATION: 0

## 2024-04-30 NOTE — TELEPHONE ENCOUNTER
Medication Management Service ([facility])  [facility]  [phone]     CLINICAL PHARMACY NOTE:    Met with patient during PCP appointment.  Concern for elevated glucose readings and need to add additional medications.  Patient been without the Clifford 3 sensors due to change in insurance.  Clifford 3 sensor samples given x 2.  Lot# [id number]  Exp. 05-.  Patient expressed that she is able to apply sensor with the help of her .    Patricia March, Pharm.D., [facility]  Clinical Pharmacist  [facility] Medication Management Service  [phone]  4/30/2024  12:21 PM      =========================================  For Pharmacy Admin Tracking Only    Program: Medical Group  CPA in place:  Yes  Recommendation Provided To: Patient/Caregiver: 1 via In person  Intervention Detail: Patient Access Assistance/Sample Provided  Intervention Accepted By: Patient/Caregiver: 1  Time Spent (min): 10

## 2024-04-30 NOTE — PROGRESS NOTES
Diabetic visit information    BP Readings from Last 3 Encounters:   11/14/23 126/82   10/03/23 106/63   09/07/23 134/82       Hemoglobin A1C (%)   Date Value   10/03/2023 8.0   06/20/2023 9.0   04/11/2023 7.6 (H)     LDL Cholesterol (mg/dL)   Date Value   10/03/2023 60               Have you changed or started any medications since your last visit including any over-the-counter medicines, vitamins, or herbal medicines? no   Have you stopped taking any of your medications? Is so, why? -  no  Are you having any side effects from any of your medications? - no    Have you seen any other physician or provider since your last visit?  no   Have you had any other diagnostic tests since your last visit?  yes - Labs   Have you been seen in the emergency room and/or had an admission in a hospital since we last saw you?  no     Have you had your annual diabetic retinal (eye) exam? Yes   (ensure copy of exam is in the chart) - [facility] in [place]    Have you had your routine dental cleaning in the past 6 months? no    Do you have an active MyChart account?  If not, what are your barriers?  Yes    Patient Care Team:  Shaina Hamilton MD as PCP - General (Family Medicine)  Shaina Hamilton MD as PCP - Empaneled Provider  Lucas Huitron MD as Consulting Physician (Gastroenterology)  Haris Granados MD Escobar, Alexander, MD as Orthopedic Surgeon  Patricia March [facility] as Pharmacist (Pharmacist)  Justyna Serna RPH as Pharmacist (Pharmacy)    Medical history Review  Past Medical, Family, and Social History reviewed and does contribute to the patient presenting condition.    Health Maintenance   Topic Date Due    Shingles vaccine (1 of 2) Never done    Diabetic retinal exam  01/12/2017    Diabetic foot exam  12/01/2017    Respiratory Syncytial Virus (RSV) Pregnant or age 60 yrs+ (1 - 1-dose 60+ series) Never done    COVID-19 Vaccine (3 - 2023-24 season) 09/01/2023    Diabetic Alb to Cr ratio (uACR) test  09/22/2023

## 2024-04-30 NOTE — PROGRESS NOTES
Treatment Adherence:   Medication compliance:  compliant most of the time  Diet compliance:  compliant most of the time  Weight trend: stable  Current exercise: no regular exercise  Barriers: none    Diabetes Mellitus Type 2: Current symptoms/problems include none.    Home blood sugar records: patient does not test  Any episodes of hypoglycemia? no  Eye exam current (within one year): no  Tobacco history: She  reports that she has never smoked. She has never used smokeless tobacco.   Daily Aspirin? Yes    Hypertension:  Home blood pressure monitoring: No.  She is adherent to a low sodium diet. Patient denies chest pain, shortness of breath, headache, lightheadedness, blurred vision, peripheral edema, palpitations, and dry cough.  Antihypertensive medication side effects: no medication side effects noted.  Use of agents associated with hypertension: none.     Hyperlipidemia:  No new myalgias or GI upset on atorvastatin (Lipitor).       Lab Results   Component Value Date    LABA1C 8.0 10/03/2023    LABA1C 9.0 06/20/2023    LABA1C 7.6 (H) 04/11/2023     Lab Results   Component Value Date    CREATININE 0.9 11/14/2023     Lab Results   Component Value Date    ALT 58 (H) 10/04/2022    AST 35 (H) 10/04/2022     Lab Results   Component Value Date    CHOL 150 10/03/2023    TRIG 129 10/03/2023    HDL 64 10/03/2023    LDLDIRECT 156 (H) 05/20/2017      Subjective:    Kavya De Santiago is a 64 y.o. female with  has a past medical history of Cervical spondylosis, CKD (chronic kidney disease), COVID-19, Fall, Generalized anxiety disorder, GERD (gastroesophageal reflux disease), History of recent hospitalization, History of swelling of feet, Hyperlipidemia, Hypertension, Left hand weakness, Lumbar radiculopathy, DIEGO (nonalcoholic steatohepatitis), Obesity, Osteoarthritis of left knee, Recurrent major depressive disorder (HCC), Restless legs syndrome, Snores, TIA (transient ischemic attack), Type II or unspecified type diabetes

## 2024-04-30 NOTE — PATIENT INSTRUCTIONS
Thank you for letting us take care of you today. We hope all your questions were addressed. If a question was overlooked or something else comes to mind after you return home, please contact a member of your Care Team listed below.      Your Care Team at [facility] is Team #1  Shaina Hamilton, Faculty Advisor  Benny Wei, Resident Physician  Matty Brown, Resident Physician  Judith Paul, Resident Physician  Jennifer Lugo, [facility]  Clare Kelley, [facility]  Mahendra Torres, [facility]  Kelsy Marin, [facility]  Etta Marquez, [facility]  Nissa Ying, [facility]  Deisi Lua, [facility]  Sharyn Crump, [facility]  Nuno (LJ) Jamaica,   Patricia March [facility] (Clinical Pharmacist)     Office phone number: [phone]    If you need to get in right away due to illness, please be advised we have \"Same Day\" appointments available Monday-Friday. Please call us at [phone] option #3 to schedule your \"Same Day\" appointment.

## 2024-05-09 ENCOUNTER — TELEPHONE (OUTPATIENT)
Dept: FAMILY MEDICINE CLINIC | Age: 64
End: 2024-05-09

## 2024-05-09 NOTE — TELEPHONE ENCOUNTER
Kavya called in this afternoon about her Disability forms, stating that Rafia at her job does not need the Functional Capacity Test done. Wanted to know if you will complete the forms.

## 2024-05-13 NOTE — TELEPHONE ENCOUNTER
E-scribe request for omeprazole and INDERAL . Please review and e-scribe if applicable.     Last Visit Date:  04/30/24  Next Visit Date:  Visit date not found    Hemoglobin A1C (%)   Date Value   10/03/2023 8.0   06/20/2023 9.0   04/11/2023 7.6 (H)             ( goal A1C is < 7)   No components found for: \"LABMICR\"  No components found for: \"LDLCHOLESTEROL\", \"LDLCALC\"    (goal LDL is <100)   AST (U/L)   Date Value   10/04/2022 35 (H)     ALT (U/L)   Date Value   10/04/2022 58 (H)     BUN (mg/dL)   Date Value   11/14/2023 17     BP Readings from Last 3 Encounters:   04/30/24 106/64   11/14/23 126/82   10/03/23 106/63          (goal 120/80)        Patient Active Problem List:     Cervical spondylosis     Type 2 diabetes mellitus without complication, without long-term current use of insulin (HCC)     Hypertension     Abnormal auditory perception     Nasal polyps     Osteoarthritis of left knee     Osteoarthritis of right knee     DIEGO (nonalcoholic steatohepatitis)     Vitamin D deficiency     Iron deficiency anemia     Dyslipidemia     Trochanteric bursitis     Chronic left shoulder pain     Restless legs syndrome     Generalized anxiety disorder     Toxic metabolic encephalopathy     Class 2 obesity in adult     Leg edema     Recurrent major depressive disorder (HCC)     Dermatitis     Retrolisthesis of vertebrae     Bilateral carpal tunnel syndrome     Intention tremor     Sciatica     Back pain with radiation     Left sciatic nerve pain     Lumbar disc disease with radiculopathy     Intractable back pain     Piriformis syndrome of left side     Ulnar neuropathy at elbow, left     S/P decompression of ulnar nerve at elbow     Cervical myelopathy with cervical radiculopathy (HCC)     Lumbar spondylosis     Right median nerve neuropathy     Sacroiliitis (HCC)     Secondary diabetes mellitus with stage 2 chronic kidney disease (GFR 60-89) (HCC)     Benign hypertension with CKD (chronic kidney disease) stage III

## 2024-05-14 RX ORDER — PROPRANOLOL HYDROCHLORIDE 80 MG/1
80 CAPSULE, EXTENDED RELEASE ORAL DAILY
Qty: 30 CAPSULE | Refills: 1 | Status: SHIPPED | OUTPATIENT
Start: 2024-05-14

## 2024-05-14 RX ORDER — OMEPRAZOLE 20 MG/1
CAPSULE, DELAYED RELEASE ORAL
Qty: 120 CAPSULE | Refills: 1 | Status: SHIPPED | OUTPATIENT
Start: 2024-05-14

## 2024-05-26 ENCOUNTER — HOSPITAL ENCOUNTER (INPATIENT)
Age: 64
LOS: 19 days | Discharge: NURSING, REHAB OR LONG-TERM CARE | End: 2024-06-14
Attending: EMERGENCY MEDICINE | Admitting: INTERNAL MEDICINE
Payer: COMMERCIAL

## 2024-05-26 ENCOUNTER — APPOINTMENT (OUTPATIENT)
Dept: GENERAL RADIOLOGY | Age: 64
End: 2024-05-26
Payer: COMMERCIAL

## 2024-05-26 ENCOUNTER — APPOINTMENT (OUTPATIENT)
Dept: GENERAL RADIOLOGY | Age: 64
End: 2024-05-26
Attending: INTERNAL MEDICINE
Payer: COMMERCIAL

## 2024-05-26 ENCOUNTER — APPOINTMENT (OUTPATIENT)
Dept: GENERAL RADIOLOGY | Age: 64
End: 2024-05-26
Attending: EMERGENCY MEDICINE
Payer: COMMERCIAL

## 2024-05-26 ENCOUNTER — APPOINTMENT (OUTPATIENT)
Dept: CT IMAGING | Age: 64
End: 2024-05-26
Payer: COMMERCIAL

## 2024-05-26 DIAGNOSIS — E83.42 HYPOMAGNESEMIA: ICD-10-CM

## 2024-05-26 DIAGNOSIS — I38 ENDOCARDITIS, UNSPECIFIED CHRONICITY, UNSPECIFIED ENDOCARDITIS TYPE: ICD-10-CM

## 2024-05-26 DIAGNOSIS — I63.429 CEREBROVASCULAR ACCIDENT (CVA) DUE TO EMBOLISM OF ANTERIOR CEREBRAL ARTERY, UNSPECIFIED BLOOD VESSEL LATERALITY (HCC): ICD-10-CM

## 2024-05-26 DIAGNOSIS — R06.02 SHORTNESS OF BREATH: ICD-10-CM

## 2024-05-26 DIAGNOSIS — I63.9 CEREBROVASCULAR ACCIDENT (CVA), UNSPECIFIED MECHANISM (HCC): ICD-10-CM

## 2024-05-26 DIAGNOSIS — R78.81 BACTEREMIA: ICD-10-CM

## 2024-05-26 DIAGNOSIS — R94.31 ABNORMAL ELECTROCARDIOGRAPHY: ICD-10-CM

## 2024-05-26 DIAGNOSIS — K52.9 COLITIS: Primary | ICD-10-CM

## 2024-05-26 DIAGNOSIS — I63.412 CEREBROVASCULAR ACCIDENT (CVA) DUE TO EMBOLISM OF LEFT MIDDLE CEREBRAL ARTERY (HCC): ICD-10-CM

## 2024-05-26 DIAGNOSIS — J96.00 ACUTE RESPIRATORY FAILURE, UNSPECIFIED WHETHER WITH HYPOXIA OR HYPERCAPNIA (HCC): ICD-10-CM

## 2024-05-26 LAB
ALBUMIN SERPL-MCNC: 4 G/DL (ref 3.5–5.2)
ALP SERPL-CCNC: 139 U/L (ref 35–104)
ALT SERPL-CCNC: 35 U/L (ref 5–33)
AMPHET UR QL SCN: NEGATIVE
ANION GAP SERPL CALCULATED.3IONS-SCNC: 16 MMOL/L (ref 9–17)
APAP SERPL-MCNC: <5 UG/ML (ref 10–30)
ARTERIAL PATENCY WRIST A: ABNORMAL
AST SERPL-CCNC: 30 U/L
B-OH-BUTYR SERPL-MCNC: 0.29 MMOL/L (ref 0.02–0.27)
BACTERIA URNS QL MICRO: ABNORMAL
BARBITURATES UR QL SCN: NEGATIVE
BASOPHILS # BLD: 0.1 K/UL (ref 0–0.2)
BASOPHILS NFR BLD: 1 % (ref 0–2)
BDY SITE: ABNORMAL
BENZODIAZ UR QL: POSITIVE
BILIRUB SERPL-MCNC: 0.4 MG/DL (ref 0.3–1.2)
BILIRUB UR QL STRIP: NEGATIVE
BODY TEMPERATURE: 37
BUN SERPL-MCNC: 20 MG/DL (ref 8–23)
CALCIUM SERPL-MCNC: 7.2 MG/DL (ref 8.6–10.4)
CANNABINOIDS UR QL SCN: NEGATIVE
CASTS #/AREA URNS LPF: ABNORMAL /LPF
CHLORIDE SERPL-SCNC: 96 MMOL/L (ref 98–107)
CLARITY UR: CLEAR
CO2 SERPL-SCNC: 23 MMOL/L (ref 20–31)
COCAINE UR QL SCN: NEGATIVE
COHGB MFR BLD: 0.9 % (ref 0–5)
COLOR UR: YELLOW
CREAT SERPL-MCNC: 1.1 MG/DL (ref 0.5–0.9)
EOSINOPHIL # BLD: 0 K/UL (ref 0–0.4)
EOSINOPHILS RELATIVE PERCENT: 0 % (ref 0–4)
EPI CELLS #/AREA URNS HPF: ABNORMAL /HPF
ERYTHROCYTE [DISTWIDTH] IN BLOOD BY AUTOMATED COUNT: 13.6 % (ref 11.5–14.9)
FENTANYL UR QL: POSITIVE
FIO2 ON VENT: 60 %
FLUAV RNA RESP QL NAA+PROBE: NOT DETECTED
FLUBV RNA RESP QL NAA+PROBE: NOT DETECTED
GAS FLOW.O2 O2 DELIVERY SYS: ABNORMAL L/MIN
GAS FLOW.O2 SETTING OXYMISER: 20 L/MIN
GFR, ESTIMATED: 56 ML/MIN/1.73M2
GLUCOSE BLD-MCNC: 202 MG/DL (ref 65–105)
GLUCOSE BLD-MCNC: 251 MG/DL (ref 65–105)
GLUCOSE BLD-MCNC: 301 MG/DL (ref 65–105)
GLUCOSE BLD-MCNC: 325 MG/DL (ref 65–105)
GLUCOSE SERPL-MCNC: 439 MG/DL (ref 70–99)
GLUCOSE UR STRIP-MCNC: ABNORMAL MG/DL
HCO3 ARTERIAL: 23.1 MMOL/L (ref 22–26)
HCT VFR BLD AUTO: 34.1 % (ref 36–46)
HGB BLD-MCNC: 10.8 G/DL (ref 12–16)
HGB UR QL STRIP.AUTO: ABNORMAL
INR PPP: 1.1
KETONES UR STRIP-MCNC: NEGATIVE MG/DL
LACTATE BLDV-SCNC: 1.7 MMOL/L (ref 0.5–2.2)
LACTATE BLDV-SCNC: 3.3 MMOL/L (ref 0.5–2.2)
LACTATE BLDV-SCNC: 3.4 MMOL/L (ref 0.5–2.2)
LEUKOCYTE ESTERASE UR QL STRIP: ABNORMAL
LYMPHOCYTES NFR BLD: 1.3 K/UL (ref 1–4.8)
LYMPHOCYTES RELATIVE PERCENT: 12 % (ref 24–44)
MAGNESIUM SERPL-MCNC: 0.9 MG/DL (ref 1.6–2.6)
MAGNESIUM SERPL-MCNC: 1.1 MG/DL (ref 1.6–2.6)
MCH RBC QN AUTO: 27.1 PG (ref 26–34)
MCHC RBC AUTO-ENTMCNC: 31.5 G/DL (ref 31–37)
MCV RBC AUTO: 85.9 FL (ref 80–100)
METHADONE UR QL: NEGATIVE
METHEMOGLOBIN: 0.9 % (ref 0–1.9)
MONOCYTES NFR BLD: 1 K/UL (ref 0.1–1.3)
MONOCYTES NFR BLD: 9 % (ref 1–7)
NEGATIVE BASE EXCESS, ART: 1.3 MMOL/L (ref 0–2)
NEUTROPHILS NFR BLD: 78 % (ref 36–66)
NEUTS SEG NFR BLD: 9.1 K/UL (ref 1.3–9.1)
NITRITE UR QL STRIP: NEGATIVE
O2 SAT, ARTERIAL: 97.2 % (ref 95–98)
OPIATES UR QL SCN: NEGATIVE
OXYCODONE UR QL SCN: NEGATIVE
PCO2 ARTERIAL: 35.1 MMHG (ref 35–45)
PCP UR QL SCN: NEGATIVE
PEEP RESPIRATORY: 7 CM[H2O]
PH ARTERIAL: 7.43 (ref 7.35–7.45)
PH UR STRIP: 5 [PH] (ref 5–8)
PLATELET # BLD AUTO: 242 K/UL (ref 150–450)
PMV BLD AUTO: 8.8 FL (ref 6–12)
PO2 ARTERIAL: 134 MMHG (ref 80–100)
POTASSIUM SERPL-SCNC: 4.4 MMOL/L (ref 3.7–5.3)
PROT SERPL-MCNC: 6.9 G/DL (ref 6.4–8.3)
PROT UR STRIP-MCNC: ABNORMAL MG/DL
PROTHROMBIN TIME: 14.1 SEC (ref 11.8–14.6)
PT. POSITION: ABNORMAL
RBC # BLD AUTO: 3.98 M/UL (ref 4–5.2)
RBC #/AREA URNS HPF: ABNORMAL /HPF
SALICYLATES SERPL-MCNC: <1 MG/DL (ref 3–10)
SARS-COV-2 RNA RESP QL NAA+PROBE: NOT DETECTED
SODIUM SERPL-SCNC: 135 MMOL/L (ref 135–144)
SOURCE: NORMAL
SP GR UR STRIP: 1.06 (ref 1–1.03)
SPECIMEN DESCRIPTION: NORMAL
TEST INFORMATION: ABNORMAL
TEXT FOR RESPIRATORY: ABNORMAL
TOTAL RATE: 20
TROPONIN I SERPL HS-MCNC: 13 NG/L (ref 0–14)
UROBILINOGEN UR STRIP-ACNC: NORMAL EU/DL (ref 0–1)
VENTILATION MODE VENT: ABNORMAL
VT: 500
WBC #/AREA URNS HPF: ABNORMAL /HPF
WBC OTHER # BLD: 11.5 K/UL (ref 3.5–11)

## 2024-05-26 PROCEDURE — 87154 CUL TYP ID BLD PTHGN 6+ TRGT: CPT

## 2024-05-26 PROCEDURE — 85025 COMPLETE CBC W/AUTO DIFF WBC: CPT

## 2024-05-26 PROCEDURE — 94002 VENT MGMT INPAT INIT DAY: CPT

## 2024-05-26 PROCEDURE — 71045 X-RAY EXAM CHEST 1 VIEW: CPT

## 2024-05-26 PROCEDURE — 80179 DRUG ASSAY SALICYLATE: CPT

## 2024-05-26 PROCEDURE — 74177 CT ABD & PELVIS W/CONTRAST: CPT

## 2024-05-26 PROCEDURE — 80307 DRUG TEST PRSMV CHEM ANLYZR: CPT

## 2024-05-26 PROCEDURE — 82010 KETONE BODYS QUAN: CPT

## 2024-05-26 PROCEDURE — 87185 SC STD ENZYME DETCJ PER NZM: CPT

## 2024-05-26 PROCEDURE — 6360000002 HC RX W HCPCS: Performed by: EMERGENCY MEDICINE

## 2024-05-26 PROCEDURE — 6370000000 HC RX 637 (ALT 250 FOR IP): Performed by: EMERGENCY MEDICINE

## 2024-05-26 PROCEDURE — 80143 DRUG ASSAY ACETAMINOPHEN: CPT

## 2024-05-26 PROCEDURE — 87205 SMEAR GRAM STAIN: CPT

## 2024-05-26 PROCEDURE — 6360000002 HC RX W HCPCS: Performed by: NURSE PRACTITIONER

## 2024-05-26 PROCEDURE — 2580000003 HC RX 258: Performed by: INTERNAL MEDICINE

## 2024-05-26 PROCEDURE — 83735 ASSAY OF MAGNESIUM: CPT

## 2024-05-26 PROCEDURE — 6370000000 HC RX 637 (ALT 250 FOR IP)

## 2024-05-26 PROCEDURE — 2580000003 HC RX 258: Performed by: EMERGENCY MEDICINE

## 2024-05-26 PROCEDURE — 6360000002 HC RX W HCPCS

## 2024-05-26 PROCEDURE — 2500000003 HC RX 250 WO HCPCS

## 2024-05-26 PROCEDURE — 99285 EMERGENCY DEPT VISIT HI MDM: CPT

## 2024-05-26 PROCEDURE — 6370000000 HC RX 637 (ALT 250 FOR IP): Performed by: INTERNAL MEDICINE

## 2024-05-26 PROCEDURE — 2000000000 HC ICU R&B

## 2024-05-26 PROCEDURE — 70450 CT HEAD/BRAIN W/O DYE: CPT

## 2024-05-26 PROCEDURE — 36600 WITHDRAWAL OF ARTERIAL BLOOD: CPT

## 2024-05-26 PROCEDURE — 83605 ASSAY OF LACTIC ACID: CPT

## 2024-05-26 PROCEDURE — C9113 INJ PANTOPRAZOLE SODIUM, VIA: HCPCS | Performed by: INTERNAL MEDICINE

## 2024-05-26 PROCEDURE — 02HV33Z INSERTION OF INFUSION DEVICE INTO SUPERIOR VENA CAVA, PERCUTANEOUS APPROACH: ICD-10-PCS | Performed by: INTERNAL MEDICINE

## 2024-05-26 PROCEDURE — 85610 PROTHROMBIN TIME: CPT

## 2024-05-26 PROCEDURE — A4216 STERILE WATER/SALINE, 10 ML: HCPCS | Performed by: INTERNAL MEDICINE

## 2024-05-26 PROCEDURE — 36415 COLL VENOUS BLD VENIPUNCTURE: CPT

## 2024-05-26 PROCEDURE — 87636 SARSCOV2 & INF A&B AMP PRB: CPT

## 2024-05-26 PROCEDURE — 99291 CRITICAL CARE FIRST HOUR: CPT | Performed by: INTERNAL MEDICINE

## 2024-05-26 PROCEDURE — 84484 ASSAY OF TROPONIN QUANT: CPT

## 2024-05-26 PROCEDURE — 96365 THER/PROPH/DIAG IV INF INIT: CPT

## 2024-05-26 PROCEDURE — 87070 CULTURE OTHR SPECIMN AEROBIC: CPT

## 2024-05-26 PROCEDURE — 82805 BLOOD GASES W/O2 SATURATION: CPT

## 2024-05-26 PROCEDURE — 31500 INSERT EMERGENCY AIRWAY: CPT

## 2024-05-26 PROCEDURE — 2580000003 HC RX 258

## 2024-05-26 PROCEDURE — 71260 CT THORAX DX C+: CPT

## 2024-05-26 PROCEDURE — 82947 ASSAY GLUCOSE BLOOD QUANT: CPT

## 2024-05-26 PROCEDURE — 80053 COMPREHEN METABOLIC PANEL: CPT

## 2024-05-26 PROCEDURE — 6360000004 HC RX CONTRAST MEDICATION: Performed by: EMERGENCY MEDICINE

## 2024-05-26 PROCEDURE — 6360000002 HC RX W HCPCS: Performed by: INTERNAL MEDICINE

## 2024-05-26 PROCEDURE — 94761 N-INVAS EAR/PLS OXIMETRY MLT: CPT

## 2024-05-26 PROCEDURE — 5A1955Z RESPIRATORY VENTILATION, GREATER THAN 96 CONSECUTIVE HOURS: ICD-10-PCS | Performed by: EMERGENCY MEDICINE

## 2024-05-26 PROCEDURE — 87040 BLOOD CULTURE FOR BACTERIA: CPT

## 2024-05-26 PROCEDURE — 87086 URINE CULTURE/COLONY COUNT: CPT

## 2024-05-26 PROCEDURE — 0BH17EZ INSERTION OF ENDOTRACHEAL AIRWAY INTO TRACHEA, VIA NATURAL OR ARTIFICIAL OPENING: ICD-10-PCS | Performed by: EMERGENCY MEDICINE

## 2024-05-26 PROCEDURE — 81001 URINALYSIS AUTO W/SCOPE: CPT

## 2024-05-26 PROCEDURE — 2700000000 HC OXYGEN THERAPY PER DAY

## 2024-05-26 PROCEDURE — 87077 CULTURE AEROBIC IDENTIFY: CPT

## 2024-05-26 PROCEDURE — 2500000003 HC RX 250 WO HCPCS: Performed by: EMERGENCY MEDICINE

## 2024-05-26 PROCEDURE — 51702 INSERT TEMP BLADDER CATH: CPT

## 2024-05-26 PROCEDURE — 36556 INSERT NON-TUNNEL CV CATH: CPT

## 2024-05-26 RX ORDER — FUROSEMIDE 20 MG/1
20 TABLET ORAL DAILY
Status: DISCONTINUED | OUTPATIENT
Start: 2024-05-26 | End: 2024-05-26

## 2024-05-26 RX ORDER — 0.9 % SODIUM CHLORIDE 0.9 %
500 INTRAVENOUS SOLUTION INTRAVENOUS ONCE
Status: DISCONTINUED | OUTPATIENT
Start: 2024-05-26 | End: 2024-06-14 | Stop reason: HOSPADM

## 2024-05-26 RX ORDER — INSULIN LISPRO 100 [IU]/ML
0-4 INJECTION, SOLUTION INTRAVENOUS; SUBCUTANEOUS
Status: DISCONTINUED | OUTPATIENT
Start: 2024-05-26 | End: 2024-05-27

## 2024-05-26 RX ORDER — MAGNESIUM SULFATE HEPTAHYDRATE 40 MG/ML
2000 INJECTION, SOLUTION INTRAVENOUS ONCE
Status: COMPLETED | OUTPATIENT
Start: 2024-05-26 | End: 2024-05-26

## 2024-05-26 RX ORDER — FENTANYL CITRATE-0.9 % NACL/PF 20 MCG/2ML
50 SYRINGE (ML) INTRAVENOUS EVERY 30 MIN PRN
Status: DISCONTINUED | OUTPATIENT
Start: 2024-05-26 | End: 2024-05-28

## 2024-05-26 RX ORDER — SODIUM CHLORIDE 9 MG/ML
INJECTION, SOLUTION INTRAVENOUS PRN
Status: DISCONTINUED | OUTPATIENT
Start: 2024-05-26 | End: 2024-06-14 | Stop reason: HOSPADM

## 2024-05-26 RX ORDER — SODIUM CHLORIDE 0.9 % (FLUSH) 0.9 %
5-40 SYRINGE (ML) INJECTION PRN
Status: DISCONTINUED | OUTPATIENT
Start: 2024-05-26 | End: 2024-06-14 | Stop reason: HOSPADM

## 2024-05-26 RX ORDER — POTASSIUM CHLORIDE 20 MEQ/1
40 TABLET, EXTENDED RELEASE ORAL PRN
Status: DISCONTINUED | OUTPATIENT
Start: 2024-05-26 | End: 2024-06-12

## 2024-05-26 RX ORDER — LISINOPRIL 20 MG/1
40 TABLET ORAL DAILY
Status: DISCONTINUED | OUTPATIENT
Start: 2024-05-26 | End: 2024-05-26

## 2024-05-26 RX ORDER — 0.9 % SODIUM CHLORIDE 0.9 %
100 INTRAVENOUS SOLUTION INTRAVENOUS ONCE
Status: COMPLETED | OUTPATIENT
Start: 2024-05-26 | End: 2024-05-26

## 2024-05-26 RX ORDER — SODIUM CHLORIDE 0.9 % (FLUSH) 0.9 %
10 SYRINGE (ML) INJECTION PRN
Status: DISCONTINUED | OUTPATIENT
Start: 2024-05-26 | End: 2024-06-14

## 2024-05-26 RX ORDER — ONDANSETRON 2 MG/ML
4 INJECTION INTRAMUSCULAR; INTRAVENOUS EVERY 6 HOURS PRN
Status: DISCONTINUED | OUTPATIENT
Start: 2024-05-26 | End: 2024-06-14 | Stop reason: HOSPADM

## 2024-05-26 RX ORDER — INSULIN LISPRO 100 [IU]/ML
0-4 INJECTION, SOLUTION INTRAVENOUS; SUBCUTANEOUS NIGHTLY
Status: DISCONTINUED | OUTPATIENT
Start: 2024-05-26 | End: 2024-05-27

## 2024-05-26 RX ORDER — SODIUM CHLORIDE 9 MG/ML
INJECTION, SOLUTION INTRAVENOUS CONTINUOUS
Status: ACTIVE | OUTPATIENT
Start: 2024-05-26 | End: 2024-05-28

## 2024-05-26 RX ORDER — LATANOPROST 50 UG/ML
1 SOLUTION/ DROPS OPHTHALMIC NIGHTLY
Status: DISCONTINUED | OUTPATIENT
Start: 2024-05-26 | End: 2024-06-14 | Stop reason: HOSPADM

## 2024-05-26 RX ORDER — ALBUTEROL SULFATE 90 UG/1
2 AEROSOL, METERED RESPIRATORY (INHALATION) EVERY 6 HOURS PRN
Status: DISCONTINUED | OUTPATIENT
Start: 2024-05-26 | End: 2024-05-29

## 2024-05-26 RX ORDER — ENOXAPARIN SODIUM 100 MG/ML
40 INJECTION SUBCUTANEOUS DAILY
Status: DISCONTINUED | OUTPATIENT
Start: 2024-05-26 | End: 2024-05-29

## 2024-05-26 RX ORDER — ROCURONIUM BROMIDE 10 MG/ML
INJECTION, SOLUTION INTRAVENOUS DAILY PRN
Status: COMPLETED | OUTPATIENT
Start: 2024-05-26 | End: 2024-05-26

## 2024-05-26 RX ORDER — POTASSIUM CHLORIDE 7.45 MG/ML
10 INJECTION INTRAVENOUS PRN
Status: DISCONTINUED | OUTPATIENT
Start: 2024-05-26 | End: 2024-06-12

## 2024-05-26 RX ORDER — METOPROLOL TARTRATE 1 MG/ML
5 INJECTION, SOLUTION INTRAVENOUS ONCE
Status: DISCONTINUED | OUTPATIENT
Start: 2024-05-26 | End: 2024-05-26

## 2024-05-26 RX ORDER — GLIPIZIDE 5 MG/1
5 TABLET ORAL
Status: DISCONTINUED | OUTPATIENT
Start: 2024-05-26 | End: 2024-05-26

## 2024-05-26 RX ORDER — METOPROLOL TARTRATE 1 MG/ML
5 INJECTION, SOLUTION INTRAVENOUS EVERY 8 HOURS
Status: DISCONTINUED | OUTPATIENT
Start: 2024-05-26 | End: 2024-05-26

## 2024-05-26 RX ORDER — MAGNESIUM SULFATE HEPTAHYDRATE 40 MG/ML
2000 INJECTION, SOLUTION INTRAVENOUS PRN
Status: DISCONTINUED | OUTPATIENT
Start: 2024-05-26 | End: 2024-06-14 | Stop reason: HOSPADM

## 2024-05-26 RX ORDER — 0.9 % SODIUM CHLORIDE 0.9 %
30 INTRAVENOUS SOLUTION INTRAVENOUS ONCE
Status: COMPLETED | OUTPATIENT
Start: 2024-05-26 | End: 2024-05-26

## 2024-05-26 RX ORDER — BUSPIRONE HYDROCHLORIDE 10 MG/1
10 TABLET ORAL 3 TIMES DAILY
Status: DISCONTINUED | OUTPATIENT
Start: 2024-05-26 | End: 2024-06-01

## 2024-05-26 RX ORDER — METOPROLOL TARTRATE 1 MG/ML
10 INJECTION, SOLUTION INTRAVENOUS ONCE
Status: DISCONTINUED | OUTPATIENT
Start: 2024-05-26 | End: 2024-05-26

## 2024-05-26 RX ORDER — ROPINIROLE 2 MG/1
4 TABLET, FILM COATED ORAL NIGHTLY
Status: DISCONTINUED | OUTPATIENT
Start: 2024-05-26 | End: 2024-05-28

## 2024-05-26 RX ORDER — VENLAFAXINE HYDROCHLORIDE 75 MG/1
75 CAPSULE, EXTENDED RELEASE ORAL DAILY
Status: DISCONTINUED | OUTPATIENT
Start: 2024-05-26 | End: 2024-06-14 | Stop reason: HOSPADM

## 2024-05-26 RX ORDER — SODIUM CHLORIDE 0.9 % (FLUSH) 0.9 %
5-40 SYRINGE (ML) INJECTION EVERY 12 HOURS SCHEDULED
Status: DISCONTINUED | OUTPATIENT
Start: 2024-05-26 | End: 2024-06-14 | Stop reason: HOSPADM

## 2024-05-26 RX ORDER — ATORVASTATIN CALCIUM 40 MG/1
40 TABLET, FILM COATED ORAL NIGHTLY
Status: DISCONTINUED | OUTPATIENT
Start: 2024-05-26 | End: 2024-06-14 | Stop reason: HOSPADM

## 2024-05-26 RX ORDER — 0.9 % SODIUM CHLORIDE 0.9 %
500 INTRAVENOUS SOLUTION INTRAVENOUS ONCE
Status: COMPLETED | OUTPATIENT
Start: 2024-05-26 | End: 2024-05-26

## 2024-05-26 RX ORDER — ACETAMINOPHEN 650 MG/1
650 SUPPOSITORY RECTAL EVERY 6 HOURS PRN
Status: DISCONTINUED | OUTPATIENT
Start: 2024-05-26 | End: 2024-06-14 | Stop reason: HOSPADM

## 2024-05-26 RX ORDER — VANCOMYCIN HYDROCHLORIDE 125 MG/1
125 CAPSULE ORAL 4 TIMES DAILY
Status: DISCONTINUED | OUTPATIENT
Start: 2024-05-26 | End: 2024-05-27 | Stop reason: ALTCHOICE

## 2024-05-26 RX ORDER — DEXTROSE MONOHYDRATE 100 MG/ML
INJECTION, SOLUTION INTRAVENOUS CONTINUOUS PRN
Status: DISCONTINUED | OUTPATIENT
Start: 2024-05-26 | End: 2024-06-14 | Stop reason: HOSPADM

## 2024-05-26 RX ORDER — INSULIN LISPRO 100 [IU]/ML
8 INJECTION, SOLUTION INTRAVENOUS; SUBCUTANEOUS ONCE
Status: COMPLETED | OUTPATIENT
Start: 2024-05-26 | End: 2024-05-26

## 2024-05-26 RX ORDER — PANTOPRAZOLE SODIUM 40 MG/1
40 TABLET, DELAYED RELEASE ORAL
Status: DISCONTINUED | OUTPATIENT
Start: 2024-05-27 | End: 2024-05-29

## 2024-05-26 RX ORDER — POLYETHYLENE GLYCOL 3350 17 G/17G
17 POWDER, FOR SOLUTION ORAL DAILY PRN
Status: DISCONTINUED | OUTPATIENT
Start: 2024-05-26 | End: 2024-06-14 | Stop reason: HOSPADM

## 2024-05-26 RX ORDER — ACETAMINOPHEN 325 MG/1
650 TABLET ORAL EVERY 6 HOURS PRN
Status: DISCONTINUED | OUTPATIENT
Start: 2024-05-26 | End: 2024-06-14 | Stop reason: HOSPADM

## 2024-05-26 RX ORDER — NOREPINEPHRINE BITARTRATE 0.06 MG/ML
1-100 INJECTION, SOLUTION INTRAVENOUS CONTINUOUS
Status: DISCONTINUED | OUTPATIENT
Start: 2024-05-27 | End: 2024-05-27

## 2024-05-26 RX ORDER — PROPOFOL 10 MG/ML
5-50 INJECTION, EMULSION INTRAVENOUS CONTINUOUS
Status: DISCONTINUED | OUTPATIENT
Start: 2024-05-26 | End: 2024-06-01

## 2024-05-26 RX ORDER — FENTANYL CITRATE-0.9 % NACL/PF 10 MCG/ML
25-200 PLASTIC BAG, INJECTION (ML) INTRAVENOUS CONTINUOUS
Status: DISCONTINUED | OUTPATIENT
Start: 2024-05-26 | End: 2024-05-28

## 2024-05-26 RX ORDER — ONDANSETRON 4 MG/1
4 TABLET, ORALLY DISINTEGRATING ORAL EVERY 8 HOURS PRN
Status: DISCONTINUED | OUTPATIENT
Start: 2024-05-26 | End: 2024-06-14 | Stop reason: HOSPADM

## 2024-05-26 RX ORDER — ASPIRIN 81 MG/1
81 TABLET ORAL DAILY
Status: DISCONTINUED | OUTPATIENT
Start: 2024-05-26 | End: 2024-06-14 | Stop reason: HOSPADM

## 2024-05-26 RX ORDER — PROPRANOLOL HYDROCHLORIDE 80 MG/1
80 CAPSULE, EXTENDED RELEASE ORAL DAILY
Status: DISCONTINUED | OUTPATIENT
Start: 2024-05-26 | End: 2024-05-29

## 2024-05-26 RX ORDER — LABETALOL HYDROCHLORIDE 5 MG/ML
10 INJECTION, SOLUTION INTRAVENOUS EVERY 4 HOURS PRN
Status: DISCONTINUED | OUTPATIENT
Start: 2024-05-26 | End: 2024-06-12

## 2024-05-26 RX ORDER — KETOROLAC TROMETHAMINE 30 MG/ML
30 INJECTION, SOLUTION INTRAMUSCULAR; INTRAVENOUS ONCE
Status: COMPLETED | OUTPATIENT
Start: 2024-05-26 | End: 2024-05-26

## 2024-05-26 RX ADMIN — MAGNESIUM SULFATE HEPTAHYDRATE 2000 MG: 40 INJECTION, SOLUTION INTRAVENOUS at 21:39

## 2024-05-26 RX ADMIN — Medication 50 MCG: at 12:58

## 2024-05-26 RX ADMIN — PIPERACILLIN AND TAZOBACTAM 4500 MG: 4; .5 INJECTION, POWDER, LYOPHILIZED, FOR SOLUTION INTRAVENOUS at 18:01

## 2024-05-26 RX ADMIN — FUROSEMIDE 20 MG: 20 TABLET ORAL at 18:23

## 2024-05-26 RX ADMIN — SODIUM CHLORIDE 100 ML: 9 INJECTION, SOLUTION INTRAVENOUS at 09:52

## 2024-05-26 RX ADMIN — SODIUM CHLORIDE, PRESERVATIVE FREE 10 ML: 5 INJECTION INTRAVENOUS at 20:28

## 2024-05-26 RX ADMIN — LATANOPROST 1 DROP: 50 SOLUTION OPHTHALMIC at 20:31

## 2024-05-26 RX ADMIN — BUSPIRONE HYDROCHLORIDE 10 MG: 10 TABLET ORAL at 18:23

## 2024-05-26 RX ADMIN — METOPROLOL TARTRATE 5 MG: 1 INJECTION, SOLUTION INTRAVENOUS at 17:48

## 2024-05-26 RX ADMIN — BUSPIRONE HYDROCHLORIDE 10 MG: 10 TABLET ORAL at 20:29

## 2024-05-26 RX ADMIN — ENOXAPARIN SODIUM 40 MG: 100 INJECTION SUBCUTANEOUS at 18:23

## 2024-05-26 RX ADMIN — PROPOFOL 20 MCG/KG/MIN: 10 INJECTION, EMULSION INTRAVENOUS at 08:55

## 2024-05-26 RX ADMIN — MAGNESIUM SULFATE HEPTAHYDRATE 2000 MG: 40 INJECTION, SOLUTION INTRAVENOUS at 23:53

## 2024-05-26 RX ADMIN — VENLAFAXINE HYDROCHLORIDE 75 MG: 75 CAPSULE, EXTENDED RELEASE ORAL at 18:23

## 2024-05-26 RX ADMIN — PROPOFOL 30 MG: 10 INJECTION, EMULSION INTRAVENOUS at 08:56

## 2024-05-26 RX ADMIN — MAGNESIUM SULFATE HEPTAHYDRATE 2000 MG: 40 INJECTION, SOLUTION INTRAVENOUS at 09:33

## 2024-05-26 RX ADMIN — GLIPIZIDE 5 MG: 5 TABLET ORAL at 18:23

## 2024-05-26 RX ADMIN — SODIUM CHLORIDE: 9 INJECTION, SOLUTION INTRAVENOUS at 15:20

## 2024-05-26 RX ADMIN — INSULIN LISPRO 2 UNITS: 100 INJECTION, SOLUTION INTRAVENOUS; SUBCUTANEOUS at 17:38

## 2024-05-26 RX ADMIN — SODIUM CHLORIDE, PRESERVATIVE FREE 10 ML: 5 INJECTION INTRAVENOUS at 20:30

## 2024-05-26 RX ADMIN — LISINOPRIL 40 MG: 20 TABLET ORAL at 18:23

## 2024-05-26 RX ADMIN — PIPERACILLIN AND TAZOBACTAM 4500 MG: 4; .5 INJECTION, POWDER, LYOPHILIZED, FOR SOLUTION INTRAVENOUS at 12:01

## 2024-05-26 RX ADMIN — ASPIRIN 81 MG: 81 TABLET, COATED ORAL at 18:23

## 2024-05-26 RX ADMIN — IOPAMIDOL 100 ML: 755 INJECTION, SOLUTION INTRAVENOUS at 09:52

## 2024-05-26 RX ADMIN — VANCOMYCIN HYDROCHLORIDE 125 MG: 125 CAPSULE ORAL at 20:30

## 2024-05-26 RX ADMIN — ROCURONIUM BROMIDE 70 MG: 10 INJECTION, SOLUTION INTRAVENOUS at 08:58

## 2024-05-26 RX ADMIN — Medication 50 MCG/HR: at 12:51

## 2024-05-26 RX ADMIN — SODIUM CHLORIDE, PRESERVATIVE FREE 10 ML: 5 INJECTION INTRAVENOUS at 09:52

## 2024-05-26 RX ADMIN — MEROPENEM 1000 MG: 1 INJECTION, POWDER, FOR SOLUTION INTRAVENOUS at 18:06

## 2024-05-26 RX ADMIN — ROPINIROLE HYDROCHLORIDE 4 MG: 2 TABLET, FILM COATED ORAL at 20:30

## 2024-05-26 RX ADMIN — MIDAZOLAM HYDROCHLORIDE 2 MG/HR: 5 INJECTION, SOLUTION INTRAMUSCULAR; INTRAVENOUS at 12:46

## 2024-05-26 RX ADMIN — LABETALOL HYDROCHLORIDE 1 MG/MIN: 5 INJECTION INTRAVENOUS at 18:20

## 2024-05-26 RX ADMIN — INSULIN LISPRO 4 UNITS: 100 INJECTION, SOLUTION INTRAVENOUS; SUBCUTANEOUS at 21:21

## 2024-05-26 RX ADMIN — LABETALOL HYDROCHLORIDE 10 MG: 5 INJECTION, SOLUTION INTRAVENOUS at 16:33

## 2024-05-26 RX ADMIN — KETOROLAC TROMETHAMINE 30 MG: 30 INJECTION, SOLUTION INTRAMUSCULAR at 21:34

## 2024-05-26 RX ADMIN — ACETAMINOPHEN 650 MG: 650 SUPPOSITORY RECTAL at 17:25

## 2024-05-26 RX ADMIN — VANCOMYCIN HYDROCHLORIDE 2250 MG: 1.25 INJECTION, POWDER, LYOPHILIZED, FOR SOLUTION INTRAVENOUS at 18:18

## 2024-05-26 RX ADMIN — SODIUM CHLORIDE 2721 ML: 9 INJECTION, SOLUTION INTRAVENOUS at 10:42

## 2024-05-26 RX ADMIN — ATORVASTATIN CALCIUM 40 MG: 40 TABLET, FILM COATED ORAL at 20:29

## 2024-05-26 RX ADMIN — INSULIN LISPRO 8 UNITS: 100 INJECTION, SOLUTION INTRAVENOUS; SUBCUTANEOUS at 11:08

## 2024-05-26 RX ADMIN — SODIUM CHLORIDE 500 ML: 9 INJECTION, SOLUTION INTRAVENOUS at 20:17

## 2024-05-26 ASSESSMENT — PULMONARY FUNCTION TESTS
PIF_VALUE: 27
PIF_VALUE: 23
PIF_VALUE: 32
PIF_VALUE: 23
PIF_VALUE: 27
PIF_VALUE: 24
PIF_VALUE: 22
PIF_VALUE: 25
PIF_VALUE: 22
PIF_VALUE: 23
PIF_VALUE: 15
PIF_VALUE: 18
PIF_VALUE: 27
PIF_VALUE: 28
PIF_VALUE: 26
PIF_VALUE: 27
PIF_VALUE: 25
PIF_VALUE: 25
PIF_VALUE: 24
PIF_VALUE: 24
PIF_VALUE: 23
PIF_VALUE: 28
PIF_VALUE: 27
PIF_VALUE: 24
PIF_VALUE: 28
PIF_VALUE: 25
PIF_VALUE: 24
PIF_VALUE: 27
PIF_VALUE: 25
PIF_VALUE: 22
PIF_VALUE: 26
PIF_VALUE: 25
PIF_VALUE: 29
PIF_VALUE: 26
PIF_VALUE: 29
PIF_VALUE: 23
PIF_VALUE: 26
PIF_VALUE: 22
PIF_VALUE: 25
PIF_VALUE: 29
PIF_VALUE: 37
PIF_VALUE: 23
PIF_VALUE: 25
PIF_VALUE: 22
PIF_VALUE: 24
PIF_VALUE: 23
PIF_VALUE: 26
PIF_VALUE: 27
PIF_VALUE: 23
PIF_VALUE: 24
PIF_VALUE: 26
PIF_VALUE: 24
PIF_VALUE: 25
PIF_VALUE: 26
PIF_VALUE: 28
PIF_VALUE: 22
PIF_VALUE: 23
PIF_VALUE: 26
PIF_VALUE: 25
PIF_VALUE: 24
PIF_VALUE: 27
PIF_VALUE: 28
PIF_VALUE: 31
PIF_VALUE: 26
PIF_VALUE: 23
PIF_VALUE: 23
PIF_VALUE: 27
PIF_VALUE: 26
PIF_VALUE: 24
PIF_VALUE: 25
PIF_VALUE: 31
PIF_VALUE: 24
PIF_VALUE: 25
PIF_VALUE: 23
PIF_VALUE: 23
PIF_VALUE: 26

## 2024-05-26 NOTE — ED PROVIDER NOTES
[facility] ICU  EMERGENCY DEPARTMENT ENCOUNTER      Pt Name: Kavya De Santiago  MRN: [id number]  Birthdate 1960  Date of evaluation: 5/26/24      CHIEF COMPLAINT       Chief Complaint   Patient presents with    Loss of Consciousness         HISTORY OF PRESENT ILLNESS   HPI 64 y.o. female presents with c/o is brought to the emergency department for evaluation of nausea vomiting altered mental status.  History is limited as the patient arrived to the emergency department with supraglottic airway and sedated.  History is obtained from the EMS report and the patient's .  EMS report states that they were called to the patient's house for nausea and vomiting.  They said that when they got there her GCS was 9.  She she had a high glucose they were concerned she was in DKA.  While transporting her to the hospital she became completely unresponsive, her O2 saturations were dropping.  They gave her 20 of etomidate 10 of Versed and placed a supraglottic airway.  She was hypertensive on their arrival with blood pressures over systolic around 160 mmHg.      In speaking with the patients , he states that she has been sick for the last week.  He says that initially she had a cold with sore throat congestion a little bit of a cough headache.  Over the last day she has had about 24 hours of nausea vomiting diarrhea could not keep any food down was basically in the bathroom throughout the night throwing up and having diarrhea..     REVIEW OF SYSTEMS       Review of Systems  Unable to obtain secondary to altered mental status   PAST MEDICAL HISTORY     Past Medical History:   Diagnosis Date    Cervical spondylosis 04/2009    c-4 c-5, c-5 c-6, c-6 c-7    CKD (chronic kidney disease)     per pt, does not have nephrologist    COVID-19 12/06/2021    nasal congestion, fatique and myalgia x 3 weeks    Fall     PATIENT FELL 2 WEEKS AGO LANDED ON [employer]    Generalized anxiety disorder 12/08/2020    GERD (gastroesophageal reflux

## 2024-05-26 NOTE — ED NOTES
Patient resting well with family at bedside. Patient has not produced urine sample through external female catheter as of intake. Will review and bladder scan patient.

## 2024-05-26 NOTE — PROGRESS NOTES
Vancomycin Dosing by Pharmacy - Initial Note   TODAY'S DATE:  5/26/2024  Patient name, age:  Kavya De Santiago, 64 y.o.    Indication: Sepsis of unknown origin, empiric  Additional antimicrobials:  Meropenem    Allergies:  Codeine   Actual Weight:    Wt Readings from Last 1 Encounters:   05/26/24 90.7 kg (200 lb)     Labs/Ancillary Data  Estimated Creatinine Clearance: 55 mL/min (A) (based on SCr of 1.1 mg/dL (H)).  Recent Labs     05/26/24  0854   CREATININE 1.1*   BUN 20   WBC 11.5*     Procalcitonin   Date Value Ref Range Status   06/06/2022 0.26 (H) <0.09 ng/mL Final     Comment:           Suspected Sepsis:  <0.50 ng/mL     Low likelihood of sepsis.  0.50-2.00 ng/mL     Increased likelihood of sepsis. Antibiotics encouraged.  >2.00 ng/mL     High risk of sepsis/shock. Antibiotics strongly encouraged.        Suspected Lower Resp Tract Infections:  <0.24 ng/mL     Low likelihood of bacterial infection.  >0.24 ng/mL     Increased likelihood of bacterial infection. Antibiotics encouraged.        With successful antibiotic therapy, PCT levels should decrease rapidly. (Half-life of 24 to   36 hours.)        Procalcitonin values from samples collected within the first 6 hours of systemic infection   may still be low. Retesting may be indicated.  Values from day 1 and day 4 can be entered into the Change in Procalcitonin Calculator   ([email or web address]) to determine the patient's Mortality Risk Prognosis        In healthy neonates, plasma Procalcitonin (PCT) concentrations increase gradually after   birth, reaching peak values at about 24 hours of age then decrease to normal values below   0.5 ng/mL by 48-72 hours of age.       No intake or output data in the 24 hours ending 05/26/24 1806  Temp: 104.8    Recent vancomycin administrations        No vancomycin IV orders with administrations found.            Orders not given:            vancomycin (VANCOCIN) capsule 125 mg    vancomycin (VANCOCIN) 2,250 mg

## 2024-05-26 NOTE — PROGRESS NOTES
Full consult to follow.    Patient intubated on vent support.  Critically ill.  With fevers.  Concern for sepsis.  Patient has had cultures drawn empiric antibiotics ordered.  There is a need for IV access.    Patient is allergies to codeine    Patient's blood counts 242  Patient's INR 1.1 prothrombin time 14.1    I did speak to patient's  regarding risk benefits of central venous catheter placement.  Questions answered.  Patient voiced understanding and signed consent witnessed by LEELA Burris MD

## 2024-05-26 NOTE — PROGRESS NOTES
Patient arrived by squad with I-Gel advanced airway in place. BMV in progress. Patient was then intubated with size 7.5 ET tube secured at the 20 cm myles at the teeth. Bilateral chest rise and breath sounds noted. Patient placed on Servo vent with prescribed settings of PRVC rate 20, Vt 500, PEEP 7, FiO2 60%.

## 2024-05-26 NOTE — CONSULTS
Not on file (11/14/2023)   Transportation Needs: Unknown (11/14/2023)    PRAPARE - Transportation     Lack of Transportation (Medical): Not on file     Lack of Transportation (Non-Medical): No   Physical Activity: Unknown (9/30/2022)    Exercise Vital Sign     Days of Exercise per Week: 0 days     Minutes of Exercise per Session: Not on file   Stress: Stress Concern Present (9/30/2022)    [employer] [place] [employer] - Occupational Stress Questionnaire     Feeling of Stress : To some extent   Social Connections: Socially Integrated (9/30/2022)    Social Connection and Isolation Panel [NHANES]     Frequency of Communication with Friends and Family: More than three times a week     Frequency of Social Gatherings with Friends and Family: Once a week     Attends Scientologist Services: More than 4 times per year     Active Member of Clubs or Organizations: Yes     Attends Club or Organization Meetings: More than 4 times per year     Marital Status:    Intimate Partner Violence: Not At Risk (9/30/2022)    Humiliation, Afraid, Rape, and Kick questionnaire     Fear of Current or Ex-Partner: No     Emotionally Abused: No     Physically Abused: No     Sexually Abused: No   Housing Stability: Unknown (11/14/2023)    Housing Stability Vital Sign     Unable to Pay for Housing in the Last Year: Not on file     Number of Places Lived in the Last Year: Not on file     Unstable Housing in the Last Year: No       FAMILY HISTORY:  Family History   Problem Relation Age of Onset    Heart Attack Mother     Diabetes Mother     Diabetes Father     Heart Attack Father        REVIEW OF SYSTEMS:  Not obtainable at this time    PHYSICAL EXAM:  Vital Signs Blood pressure (!) 164/78, pulse 84, temperature (!) 104.8 °F (40.4 °C), temperature source Rectal, resp. rate 16, weight 90.7 kg (200 lb), SpO2 99 %, not currently breastfeeding.  Oxygen Amount and Delivery:      Admission Weight Weight - Scale: 90.7 kg (200 lb)    General  Appearance   Critically ill-appearing 64-year-old female resting quietly  Head  Normocephalic, without obvious abnormality, atraumatic    Eyes  conjunctivae clear.     ENT endotracheal tube intact  Neck  no adenopathy, no carotid bruit,   Lungs coarse breath sounds I do not hear any crackles rales or wheeze  Heart: regular rate and rhythm, S1, S2 normal, no murmur, click, rub or gallop  Abdomen  soft, non-tender; bowel sounds normal  Extremities no signs of discoloration    Skin  Skin color, texture, turgor normal. No rashes or lesions  Neurologic: Intubated sedated on vent support        Imaging    Chest x-ray reveals endotracheal tube in the right IJ CVC in decent position      Lab Review  CBC     Lab Results   Component Value Date/Time    WBC 11.5 05/26/2024 08:54 AM    RBC 3.98 05/26/2024 08:54 AM    HGB 10.8 05/26/2024 08:54 AM    HCT 34.1 05/26/2024 08:54 AM     05/26/2024 08:54 AM    MCV 85.9 05/26/2024 08:54 AM    MCH 27.1 05/26/2024 08:54 AM    MCHC 31.5 05/26/2024 08:54 AM    RDW 13.6 05/26/2024 08:54 AM    LYMPHOPCT 12 05/26/2024 08:54 AM    MONOPCT 9 05/26/2024 08:54 AM    EOSPCT 0 05/26/2024 08:54 AM    BASOPCT 1 05/26/2024 08:54 AM    MONOSABS 1.00 05/26/2024 08:54 AM    LYMPHSABS 2.22 04/11/2023 10:48 AM    EOSABS 0.00 05/26/2024 08:54 AM    BASOSABS 0.10 05/26/2024 08:54 AM    DIFFTYPE NOT REPORTED 07/21/2021 10:15 AM       BMP   Lab Results   Component Value Date/Time     05/26/2024 08:54 AM    K 4.4 05/26/2024 08:54 AM    CL 96 05/26/2024 08:54 AM    CO2 23 05/26/2024 08:54 AM    BUN 20 05/26/2024 08:54 AM    CREATININE 1.1 05/26/2024 08:54 AM    GLUCOSE 439 05/26/2024 08:54 AM    GLUCOSE 137 05/19/2012 12:05 PM    CALCIUM 7.2 05/26/2024 08:54 AM    MG 0.9 05/26/2024 08:54 AM    PHOS 4.1 04/11/2023 10:48 AM       LFTS  Lab Results   Component Value Date/Time    ALKPHOS 139 05/26/2024 08:54 AM    ALT 35 05/26/2024 08:54 AM    AST 30 05/26/2024 08:54 AM    PROT 6.7 05/19/2012 12:05 PM

## 2024-05-26 NOTE — PROCEDURES
PROCEDURE NOTE  Date: 5/26/2024   Name: Kavya De Santiago  YOB: 1960    Procedures    Right IJ CVC placement    Indication prediagnosis: Need for IV access in the critically ill    Post diagnosis: Same    Estimated blood loss less than 8 mL    Consent for procedure signed by patient's     Patient right IJ area was prepped with ChloraPrep and draped in usual sterile fashion.  Using ultrasound guidance, we were able to place a triple-lumen CVC on the first attempt to the right IJ area.  The distal port did draw back dark blood and was flushed with saline.    Sutures x 2 were placed.    Chest x-ray revealed no evidence of pneumothorax.    We will proceed with using the central line.    No immediate complications appreciated    Murray Burris MD

## 2024-05-26 NOTE — ED NOTES
TRANSFER - OUT REPORT:    Verbal report given to LEELA Delgado on Kavya De Santiago  being transferred to ICU 2003 for routine progression of patient care       Report consisted of patient's Situation, Background, Assessment and   Recommendations(SBAR).     Information from the following report(s) Nurse Handoff Report, ED Encounter Summary, ED SBAR, Adult Overview, MAR, Recent Results, and Event Log was reviewed with the receiving nurse.    Clements Fall Assessment:                           Lines:   Peripheral IV 05/26/24 Right Antecubital (Active)       Peripheral IV 05/26/24 Distal;Left Forearm (Active)        Opportunity for questions and clarification was provided.      Patient transported with:  Monitor, O2 @ Vent lpm, Patient-specific medications from Pharmacy, Registered Nurse, and Tech

## 2024-05-26 NOTE — H&P
[facility]  IN-PATIENT SERVICE  [facility]  IN-PATIENT SERVICE   [facility]     HISTORY AND PHYSICAL EXAMINATION            Date:   5/26/2024  Patient name:  Kavya De Santiago  Date of admission:  5/26/2024  8:51 AM  MRN:   [id number]  Account:  [id number]  YOB: 1960  PCP:    Shaina Hamilton MD  Room:   06/06  Code Status:    Prior    SUBJECTIVE     Chief Complaint:    Chief Complaint   Patient presents with    Loss of Consciousness       History provided by:    Spouse (Rj), electronic medical record    History of Present Illness:    64-year-old female with history of T2DM, HTN, HLD, CKD, GERD, DIEGO, obesity who was brought to the ED for AMS, nausea, vomiting, diarrhea.  Patient is intubated at the time of my visit, her  Rj is present to answer questions.  Reportedly patient had some upper respiratory symptoms over the past week or so, with a dry cough, decreased appetite, rhinorrhea, congestion, headache/sinus pain.  This morning at 4 AM patient began to have diarrhea,  is unsure if it was bloody or not, and she also had several episodes of vomiting.  When he checked on her he found her on the ground, EMS was called, they arrived she has a GCS was 8-9, O2 saturations dropped on the way to the ED, given etomidate and Versed and placed a supraglottic airway, on arrival to the ED she was intubated.   CT head showed no acute abnormality, CTA chest showed no evidence of PE, CT abdomen pelvis showed pancolitis with abnormal wall thickening of the ascending, transverse, descending colon, sigmoid colon and rectum.  Lactate elevated 3.3.  Magnesium 0.9, replaced.  Leukocytosis with WBC 11.5.  Started on piperacillin/tazobactam.  Chest x-ray showed left basilar atelectasis/scarring versus infiltrate and/or small effusion. Blood sugar elevated 439 on arrival, ABG showed no acidosis, anion  medical condition->Emergency Medical Condition (MA) Reason for Exam: abdominal pain, vomtiing, diarrha.  cough, sob, hypoxia FINDINGS: Lower Chest: There is dependent atelectasis in the right and left lung bases. Organs:  Liver demonstrates no suspicious mass. There is no evidence of intrahepatic biliary ductal dilatation. The spleen, pancreas and adrenal glands are unremarkable. The kidneys demonstrate no evidence of hydronephrosis. GI/Bowel:  There is no evidence of bowel obstruction.  There is abnormal bowel wall thickening of the ascending, transverse, descending colon, sigmoid colon and rectum consistent with colitis. Pelvis:  No acute abnormality of the pelvis organs or bladder. Peritoneum/Retroperitoneum: No evidence of ascites or free air. Bones/Soft Tissues: There is umbilical hernia containing fat measures 2 cm.     1. Abnormal bowel wall thickening of the ascending, transverse, descending colon, sigmoid colon and rectum consistent with colitis. 2. Umbilical hernia containing fat measures 2 cm.     CT HEAD WO CONTRAST    Result Date: 5/26/2024  EXAMINATION: CT OF THE HEAD WITHOUT CONTRAST  5/26/2024 9:16 am TECHNIQUE: CT of the head was performed without the administration of intravenous contrast. Automated exposure control, iterative reconstruction, and/or weight based adjustment of the mA/kV was utilized to reduce the radiation dose to as low as reasonably achievable. COMPARISON: None. HISTORY: ORDERING SYSTEM PROVIDED HISTORY: ams, vomiting, TECHNOLOGIST PROVIDED HISTORY: ams, vomiting, Decision Support Exception - unselect if not a suspected or confirmed emergency medical condition->Emergency Medical Condition (MA) Reason for Exam: Found down, LT pupil more reactive than RT. Nausea, vomiting.  Intubated in route FINDINGS: BRAIN/VENTRICLES: There is no acute intracranial hemorrhage, mass effect or midline shift.  No abnormal extra-axial fluid collection.  The gray-white differentiation is maintained

## 2024-05-26 NOTE — ED NOTES
Author made attempt to place indwelling urinary catheter x 2 attempts with new kits without successful placement. Labia is swollen with meatus prolapsed. Author can visualize meatus, but can not successfully place catheter through. LEELA Lee will place catheter once patient is brought to ICU 2003.    <<--- Click to launch

## 2024-05-27 ENCOUNTER — APPOINTMENT (OUTPATIENT)
Dept: GENERAL RADIOLOGY | Age: 64
End: 2024-05-27
Payer: COMMERCIAL

## 2024-05-27 PROBLEM — J96.00 ACUTE RESPIRATORY FAILURE (HCC): Status: ACTIVE | Noted: 2024-05-27

## 2024-05-27 PROBLEM — R50.9 FEVER: Status: ACTIVE | Noted: 2024-05-27

## 2024-05-27 PROBLEM — K51.00 PANCOLITIS (HCC): Status: ACTIVE | Noted: 2024-05-27

## 2024-05-27 LAB
25(OH)D3 SERPL-MCNC: 9 NG/ML (ref 30–100)
ANION GAP SERPL CALCULATED.3IONS-SCNC: 13 MMOL/L (ref 9–17)
ARTERIAL PATENCY WRIST A: ABNORMAL
BASOPHILS # BLD: 0 K/UL (ref 0–0.2)
BASOPHILS NFR BLD: 0 % (ref 0–2)
BDY SITE: ABNORMAL
BODY TEMPERATURE: 37
BUN SERPL-MCNC: 16 MG/DL (ref 8–23)
CA-I BLD-SCNC: 0.97 MMOL/L (ref 1.1–1.33)
CALCIUM SERPL-MCNC: 6.4 MG/DL (ref 8.6–10.4)
CHLORIDE SERPL-SCNC: 108 MMOL/L (ref 98–107)
CO2 SERPL-SCNC: 19 MMOL/L (ref 20–31)
COHGB MFR BLD: 1.9 % (ref 0–5)
CREAT SERPL-MCNC: 1.1 MG/DL (ref 0.5–0.9)
EOSINOPHIL # BLD: 0 K/UL (ref 0–0.4)
EOSINOPHILS RELATIVE PERCENT: 0 % (ref 0–4)
ERYTHROCYTE [DISTWIDTH] IN BLOOD BY AUTOMATED COUNT: 13.8 % (ref 11.5–14.9)
FIO2 ON VENT: 30 %
GAS FLOW.O2 O2 DELIVERY SYS: ABNORMAL L/MIN
GAS FLOW.O2 SETTING OXYMISER: 20 L/MIN
GFR, ESTIMATED: 56 ML/MIN/1.73M2
GLUCOSE BLD-MCNC: 175 MG/DL (ref 65–105)
GLUCOSE BLD-MCNC: 181 MG/DL (ref 65–105)
GLUCOSE BLD-MCNC: 231 MG/DL (ref 65–105)
GLUCOSE BLD-MCNC: 334 MG/DL (ref 65–105)
GLUCOSE SERPL-MCNC: 335 MG/DL (ref 70–99)
HCO3 ARTERIAL: 18.4 MMOL/L (ref 22–26)
HCT VFR BLD AUTO: 28.9 % (ref 36–46)
HCT VFR BLD AUTO: 30.9 % (ref 36–46)
HGB BLD-MCNC: 8.9 G/DL (ref 12–16)
HGB BLD-MCNC: 9.7 G/DL (ref 12–16)
LACTATE BLDV-SCNC: 1.1 MMOL/L (ref 0.5–2.2)
LYMPHOCYTES NFR BLD: 1 K/UL (ref 1–4.8)
LYMPHOCYTES RELATIVE PERCENT: 10 % (ref 24–44)
MAGNESIUM SERPL-MCNC: 2.1 MG/DL (ref 1.6–2.6)
MCH RBC QN AUTO: 26.8 PG (ref 26–34)
MCHC RBC AUTO-ENTMCNC: 30.9 G/DL (ref 31–37)
MCV RBC AUTO: 86.7 FL (ref 80–100)
METHEMOGLOBIN: 1 % (ref 0–1.9)
MICROORGANISM SPEC CULT: NO GROWTH
MICROORGANISM SPEC CULT: NORMAL
MICROORGANISM/AGENT SPEC: NORMAL
MONOCYTES NFR BLD: 0.9 K/UL (ref 0.1–1.3)
MONOCYTES NFR BLD: 9 % (ref 1–7)
NEGATIVE BASE EXCESS, ART: 7 MMOL/L (ref 0–2)
NEUTROPHILS NFR BLD: 81 % (ref 36–66)
NEUTS SEG NFR BLD: 8.5 K/UL (ref 1.3–9.1)
O2 SAT, ARTERIAL: 93.1 % (ref 95–98)
PARTIAL THROMBOPLASTIN TIME: 36 SEC (ref 24–36)
PCO2 ARTERIAL: 32.5 MMHG (ref 35–45)
PEEP RESPIRATORY: 7 CM[H2O]
PH ARTERIAL: 7.36 (ref 7.35–7.45)
PLATELET # BLD AUTO: 179 K/UL (ref 150–450)
PMV BLD AUTO: 8.3 FL (ref 6–12)
PO2 ARTERIAL: 79 MMHG (ref 80–100)
POTASSIUM SERPL-SCNC: 3.8 MMOL/L (ref 3.7–5.3)
PROCALCITONIN SERPL-MCNC: 11.4 NG/ML
PT. POSITION: ABNORMAL
RBC # BLD AUTO: 3.33 M/UL (ref 4–5.2)
RESPIRATORY RATE: 20
SODIUM SERPL-SCNC: 140 MMOL/L (ref 135–144)
SPECIMEN DESCRIPTION: NORMAL
SPECIMEN DESCRIPTION: NORMAL
TEXT FOR RESPIRATORY: ABNORMAL
TOTAL RATE: 20
VENTILATION MODE VENT: ABNORMAL
VT: 500
WBC OTHER # BLD: 10.4 K/UL (ref 3.5–11)

## 2024-05-27 PROCEDURE — 6360000002 HC RX W HCPCS: Performed by: INTERNAL MEDICINE

## 2024-05-27 PROCEDURE — 85018 HEMOGLOBIN: CPT

## 2024-05-27 PROCEDURE — 6360000002 HC RX W HCPCS: Performed by: NURSE PRACTITIONER

## 2024-05-27 PROCEDURE — 2700000000 HC OXYGEN THERAPY PER DAY

## 2024-05-27 PROCEDURE — 82805 BLOOD GASES W/O2 SATURATION: CPT

## 2024-05-27 PROCEDURE — 99291 CRITICAL CARE FIRST HOUR: CPT | Performed by: INTERNAL MEDICINE

## 2024-05-27 PROCEDURE — 2500000003 HC RX 250 WO HCPCS

## 2024-05-27 PROCEDURE — 85025 COMPLETE CBC W/AUTO DIFF WBC: CPT

## 2024-05-27 PROCEDURE — 99255 IP/OBS CONSLTJ NEW/EST HI 80: CPT | Performed by: INTERNAL MEDICINE

## 2024-05-27 PROCEDURE — 99223 1ST HOSP IP/OBS HIGH 75: CPT | Performed by: INTERNAL MEDICINE

## 2024-05-27 PROCEDURE — 2580000003 HC RX 258

## 2024-05-27 PROCEDURE — 2580000003 HC RX 258: Performed by: INTERNAL MEDICINE

## 2024-05-27 PROCEDURE — 85730 THROMBOPLASTIN TIME PARTIAL: CPT

## 2024-05-27 PROCEDURE — 87641 MR-STAPH DNA AMP PROBE: CPT

## 2024-05-27 PROCEDURE — 2000000000 HC ICU R&B

## 2024-05-27 PROCEDURE — 6360000002 HC RX W HCPCS

## 2024-05-27 PROCEDURE — 83735 ASSAY OF MAGNESIUM: CPT

## 2024-05-27 PROCEDURE — 94761 N-INVAS EAR/PLS OXIMETRY MLT: CPT

## 2024-05-27 PROCEDURE — 36415 COLL VENOUS BLD VENIPUNCTURE: CPT

## 2024-05-27 PROCEDURE — 82330 ASSAY OF CALCIUM: CPT

## 2024-05-27 PROCEDURE — 3E033XZ INTRODUCTION OF VASOPRESSOR INTO PERIPHERAL VEIN, PERCUTANEOUS APPROACH: ICD-10-PCS | Performed by: INTERNAL MEDICINE

## 2024-05-27 PROCEDURE — 84145 PROCALCITONIN (PCT): CPT

## 2024-05-27 PROCEDURE — 82306 VITAMIN D 25 HYDROXY: CPT

## 2024-05-27 PROCEDURE — 36600 WITHDRAWAL OF ARTERIAL BLOOD: CPT

## 2024-05-27 PROCEDURE — 80048 BASIC METABOLIC PNL TOTAL CA: CPT

## 2024-05-27 PROCEDURE — 82947 ASSAY GLUCOSE BLOOD QUANT: CPT

## 2024-05-27 PROCEDURE — 83605 ASSAY OF LACTIC ACID: CPT

## 2024-05-27 PROCEDURE — 6370000000 HC RX 637 (ALT 250 FOR IP): Performed by: INTERNAL MEDICINE

## 2024-05-27 PROCEDURE — 6370000000 HC RX 637 (ALT 250 FOR IP)

## 2024-05-27 PROCEDURE — 71045 X-RAY EXAM CHEST 1 VIEW: CPT

## 2024-05-27 PROCEDURE — 85014 HEMATOCRIT: CPT

## 2024-05-27 PROCEDURE — APPNB60 APP NON BILLABLE TIME 46-60 MINS: Performed by: NURSE PRACTITIONER

## 2024-05-27 PROCEDURE — 2500000003 HC RX 250 WO HCPCS: Performed by: INTERNAL MEDICINE

## 2024-05-27 PROCEDURE — 94003 VENT MGMT INPAT SUBQ DAY: CPT

## 2024-05-27 RX ORDER — NOREPINEPHRINE BITARTRATE 0.06 MG/ML
1-100 INJECTION, SOLUTION INTRAVENOUS CONTINUOUS
Status: DISCONTINUED | OUTPATIENT
Start: 2024-05-27 | End: 2024-05-28

## 2024-05-27 RX ORDER — CALCIUM GLUCONATE 10 MG/ML
1000 INJECTION, SOLUTION INTRAVENOUS ONCE
Status: COMPLETED | OUTPATIENT
Start: 2024-05-27 | End: 2024-05-27

## 2024-05-27 RX ORDER — VANCOMYCIN HYDROCHLORIDE 50 MG/ML
125 KIT ORAL 4 TIMES DAILY
Status: DISCONTINUED | OUTPATIENT
Start: 2024-05-27 | End: 2024-05-28

## 2024-05-27 RX ORDER — METRONIDAZOLE 500 MG/100ML
500 INJECTION, SOLUTION INTRAVENOUS EVERY 8 HOURS
Status: DISCONTINUED | OUTPATIENT
Start: 2024-05-27 | End: 2024-05-27

## 2024-05-27 RX ORDER — INSULIN GLARGINE 100 [IU]/ML
5 INJECTION, SOLUTION SUBCUTANEOUS DAILY
Status: DISCONTINUED | OUTPATIENT
Start: 2024-05-27 | End: 2024-05-29

## 2024-05-27 RX ORDER — INSULIN LISPRO 100 [IU]/ML
0-8 INJECTION, SOLUTION INTRAVENOUS; SUBCUTANEOUS
Status: DISCONTINUED | OUTPATIENT
Start: 2024-05-27 | End: 2024-05-28

## 2024-05-27 RX ORDER — 0.9 % SODIUM CHLORIDE 0.9 %
1000 INTRAVENOUS SOLUTION INTRAVENOUS ONCE
Status: COMPLETED | OUTPATIENT
Start: 2024-05-27 | End: 2024-05-27

## 2024-05-27 RX ORDER — INSULIN LISPRO 100 [IU]/ML
0-4 INJECTION, SOLUTION INTRAVENOUS; SUBCUTANEOUS NIGHTLY
Status: DISCONTINUED | OUTPATIENT
Start: 2024-05-27 | End: 2024-05-28

## 2024-05-27 RX ADMIN — VANCOMYCIN HYDROCHLORIDE 1500 MG: 1.5 INJECTION, POWDER, LYOPHILIZED, FOR SOLUTION INTRAVENOUS at 18:10

## 2024-05-27 RX ADMIN — Medication 75 MCG/HR: at 19:57

## 2024-05-27 RX ADMIN — ROPINIROLE HYDROCHLORIDE 4 MG: 2 TABLET, FILM COATED ORAL at 20:04

## 2024-05-27 RX ADMIN — ACETAMINOPHEN 650 MG: 650 SUPPOSITORY RECTAL at 08:48

## 2024-05-27 RX ADMIN — BUSPIRONE HYDROCHLORIDE 10 MG: 10 TABLET ORAL at 08:48

## 2024-05-27 RX ADMIN — SODIUM CHLORIDE: 9 INJECTION, SOLUTION INTRAVENOUS at 22:39

## 2024-05-27 RX ADMIN — INSULIN LISPRO 2 UNITS: 100 INJECTION, SOLUTION INTRAVENOUS; SUBCUTANEOUS at 12:41

## 2024-05-27 RX ADMIN — Medication 5 MCG/MIN: at 00:26

## 2024-05-27 RX ADMIN — LABETALOL HYDROCHLORIDE 10 MG: 5 INJECTION, SOLUTION INTRAVENOUS at 15:10

## 2024-05-27 RX ADMIN — MEROPENEM 1000 MG: 1 INJECTION, POWDER, FOR SOLUTION INTRAVENOUS at 02:26

## 2024-05-27 RX ADMIN — CEFTRIAXONE SODIUM 2000 MG: 2 INJECTION, POWDER, FOR SOLUTION INTRAMUSCULAR; INTRAVENOUS at 17:03

## 2024-05-27 RX ADMIN — ATORVASTATIN CALCIUM 40 MG: 40 TABLET, FILM COATED ORAL at 20:04

## 2024-05-27 RX ADMIN — ACETAMINOPHEN 650 MG: 650 SUPPOSITORY RECTAL at 15:13

## 2024-05-27 RX ADMIN — ENOXAPARIN SODIUM 40 MG: 100 INJECTION SUBCUTANEOUS at 08:48

## 2024-05-27 RX ADMIN — SODIUM CHLORIDE: 9 INJECTION, SOLUTION INTRAVENOUS at 12:22

## 2024-05-27 RX ADMIN — Medication 25 MCG/HR: at 02:05

## 2024-05-27 RX ADMIN — INSULIN GLARGINE 5 UNITS: 100 INJECTION, SOLUTION SUBCUTANEOUS at 08:48

## 2024-05-27 RX ADMIN — VANCOMYCIN HYDROCHLORIDE 125 MG: KIT at 17:01

## 2024-05-27 RX ADMIN — SODIUM CHLORIDE, PRESERVATIVE FREE 10 ML: 5 INJECTION INTRAVENOUS at 20:03

## 2024-05-27 RX ADMIN — LATANOPROST 1 DROP: 50 SOLUTION OPHTHALMIC at 20:05

## 2024-05-27 RX ADMIN — ASPIRIN 81 MG: 81 TABLET, COATED ORAL at 08:48

## 2024-05-27 RX ADMIN — VANCOMYCIN HYDROCHLORIDE 125 MG: KIT at 14:45

## 2024-05-27 RX ADMIN — SODIUM CHLORIDE 1000 ML: 9 INJECTION, SOLUTION INTRAVENOUS at 00:56

## 2024-05-27 RX ADMIN — CALCIUM GLUCONATE 1000 MG: 10 INJECTION, SOLUTION INTRAVENOUS at 12:50

## 2024-05-27 RX ADMIN — BUSPIRONE HYDROCHLORIDE 10 MG: 10 TABLET ORAL at 20:04

## 2024-05-27 RX ADMIN — METRONIDAZOLE 500 MG: 500 INJECTION, SOLUTION INTRAVENOUS at 09:03

## 2024-05-27 RX ADMIN — MIDAZOLAM HYDROCHLORIDE 3 MG/HR: 5 INJECTION, SOLUTION INTRAMUSCULAR; INTRAVENOUS at 19:59

## 2024-05-27 RX ADMIN — BUSPIRONE HYDROCHLORIDE 10 MG: 10 TABLET ORAL at 14:45

## 2024-05-27 RX ADMIN — MEROPENEM 1000 MG: 1 INJECTION, POWDER, FOR SOLUTION INTRAVENOUS at 10:21

## 2024-05-27 RX ADMIN — INSULIN LISPRO 6 UNITS: 100 INJECTION, SOLUTION INTRAVENOUS; SUBCUTANEOUS at 08:50

## 2024-05-27 RX ADMIN — VANCOMYCIN HYDROCHLORIDE 125 MG: KIT at 20:07

## 2024-05-27 ASSESSMENT — PULMONARY FUNCTION TESTS
PIF_VALUE: 21
PIF_VALUE: 10
PIF_VALUE: 18
PIF_VALUE: 25
PIF_VALUE: 20
PIF_VALUE: 21
PIF_VALUE: 22
PIF_VALUE: 21
PIF_VALUE: 29
PIF_VALUE: 24
PIF_VALUE: 25
PIF_VALUE: 20
PIF_VALUE: 20
PIF_VALUE: 13
PIF_VALUE: 23
PIF_VALUE: 23
PIF_VALUE: 25
PIF_VALUE: 19
PIF_VALUE: 18
PIF_VALUE: 18
PIF_VALUE: 15
PIF_VALUE: 23
PIF_VALUE: 27
PIF_VALUE: 14
PIF_VALUE: 27
PIF_VALUE: 20
PIF_VALUE: 24
PIF_VALUE: 22
PIF_VALUE: 23
PIF_VALUE: 22
PIF_VALUE: 21
PIF_VALUE: 27
PIF_VALUE: 14
PIF_VALUE: 10
PIF_VALUE: 18
PIF_VALUE: 22
PIF_VALUE: 9
PIF_VALUE: 11
PIF_VALUE: 14
PIF_VALUE: 21
PIF_VALUE: 21
PIF_VALUE: 16
PIF_VALUE: 11
PIF_VALUE: 24
PIF_VALUE: 23
PIF_VALUE: 11
PIF_VALUE: 14
PIF_VALUE: 24
PIF_VALUE: 22
PIF_VALUE: 16
PIF_VALUE: 21
PIF_VALUE: 25
PIF_VALUE: 25
PIF_VALUE: 27
PIF_VALUE: 34
PIF_VALUE: 17
PIF_VALUE: 21
PIF_VALUE: 19
PIF_VALUE: 10
PIF_VALUE: 20
PIF_VALUE: 18
PIF_VALUE: 20
PIF_VALUE: 21
PIF_VALUE: 20
PIF_VALUE: 30
PIF_VALUE: 21
PIF_VALUE: 16
PIF_VALUE: 22
PIF_VALUE: 8
PIF_VALUE: 12
PIF_VALUE: 12
PIF_VALUE: 28
PIF_VALUE: 18
PIF_VALUE: 17
PIF_VALUE: 11
PIF_VALUE: 19
PIF_VALUE: 22
PIF_VALUE: 23
PIF_VALUE: 20
PIF_VALUE: 21
PIF_VALUE: 24
PIF_VALUE: 22

## 2024-05-27 NOTE — PROGRESS NOTES
Results   Component Value Date/Time    PHART 7.361 05/27/2024 04:15 AM    PO2ART 79.0 05/27/2024 04:15 AM    NCT8SXJ 32.5 05/27/2024 04:15 AM       Lab Results   Component Value Date/Time    MODE PRVC 05/27/2024 04:15 AM         Medications   IV   norepinephrine Stopped (05/27/24 0730)    propofol Stopped (05/26/24 1255)    midazolam 3 mg/hr (05/27/24 1254)    fentaNYL 75 mcg/hr (05/27/24 1243)    sodium chloride      dextrose      sodium chloride 100 mL/hr at 05/27/24 1222    labetalol (NORMODYNE;TRANDATE) 300 mg in sodium chloride 0.9 % 300 mL infusion Stopped (05/26/24 2240)      insulin glargine  5 Units SubCUTAneous Daily    insulin lispro  0-8 Units SubCUTAneous TID WC    insulin lispro  0-4 Units SubCUTAneous Nightly    vancomycin  125 mg Oral 4x daily    aspirin  81 mg Oral Daily    atorvastatin  40 mg Oral Nightly    latanoprost  1 drop Both Eyes Nightly    busPIRone  10 mg Oral TID    venlafaxine  75 mg Oral Daily    rOPINIRole  4 mg Oral Nightly    [Held by provider] propranolol  80 mg Oral Daily    pantoprazole  40 mg Oral QAM AC    sodium chloride flush  5-40 mL IntraVENous 2 times per day    enoxaparin  40 mg SubCUTAneous Daily    meropenem  1,000 mg IntraVENous Q8H    vancomycin (VANCOCIN) intermittent dosing (placeholder)   Other RX Placeholder    vancomycin  1,500 mg IntraVENous Q24H    sodium chloride  500 mL IntraVENous Once       Diet/Nutrition   Diet NPO    Exam   VITALS    weight is 90.7 kg (200 lb). Her temperature is 103.5 °F (39.7 °C) (abnormal). Her blood pressure is 121/51 (abnormal) and her pulse is 91. Her respiration is 21 and oxygen saturation is 96%.   Ventilator Settings (Basic)  Vent Mode: AC/PRVC Resp Rate (Set): 20 bpm/Vt (Set, mL): 500 mL/ /FiO2 : 30 %    Constitutional - Sedated  General Appearance  well developed, well nourished  HEENT - Life support devices in place (ET, ),normocephalic,   Lungs - Chest expands equally, no wheezes, rales or rhonchi.  Cardiovascular - Heart  sounds are normal.  normal rate and rhythm regular, no murmur, gallop or rub.  Abdomen - soft, nontender,   Extremities - no cyanosis, clubbing     Lab Results   CBC     Lab Results   Component Value Date/Time    WBC 10.4 05/27/2024 06:12 AM    RBC 3.33 05/27/2024 06:12 AM    HGB 9.7 05/27/2024 03:03 PM    HCT 30.9 05/27/2024 03:03 PM     05/27/2024 06:12 AM    MCV 86.7 05/27/2024 06:12 AM    MCH 26.8 05/27/2024 06:12 AM    MCHC 30.9 05/27/2024 06:12 AM    RDW 13.8 05/27/2024 06:12 AM    LYMPHOPCT 10 05/27/2024 06:12 AM    MONOPCT 9 05/27/2024 06:12 AM    EOSPCT 0 05/27/2024 06:12 AM    BASOPCT 0 05/27/2024 06:12 AM    MONOSABS 0.90 05/27/2024 06:12 AM    LYMPHSABS 2.22 04/11/2023 10:48 AM    EOSABS 0.00 05/27/2024 06:12 AM    BASOSABS 0.00 05/27/2024 06:12 AM    DIFFTYPE NOT REPORTED 07/21/2021 10:15 AM       BMP   Lab Results   Component Value Date/Time     05/27/2024 07:59 AM    K 3.8 05/27/2024 07:59 AM     05/27/2024 07:59 AM    CO2 19 05/27/2024 07:59 AM    BUN 16 05/27/2024 07:59 AM    CREATININE 1.1 05/27/2024 07:59 AM    GLUCOSE 335 05/27/2024 07:59 AM    GLUCOSE 137 05/19/2012 12:05 PM    CALCIUM 6.4 05/27/2024 07:59 AM    MG 2.1 05/27/2024 10:20 AM    PHOS 4.1 04/11/2023 10:48 AM       LFTS  Lab Results   Component Value Date/Time    ALKPHOS 139 05/26/2024 08:54 AM    ALT 35 05/26/2024 08:54 AM    AST 30 05/26/2024 08:54 AM    PROT 6.7 05/19/2012 12:05 PM    BILITOT 0.4 05/26/2024 08:54 AM    BILIDIR <0.08 08/04/2020 11:01 AM    IBILI CANNOT BE CALCULATED 08/04/2020 11:01 AM       INR  Recent Labs     05/26/24  0854   PROTIME 14.1   INR 1.1       APTT  Recent Labs     05/27/24  0612   APTT 36.0       Lactic Acid  Lab Results   Component Value Date/Time    LACTA 1.7 05/26/2024 09:09 PM    LACTA 3.4 05/26/2024 06:36 PM    LACTA 3.3 05/26/2024 10:30 AM        BNP   No results for input(s): \"BNP\" in the last 72 hours.     Cultures   1 of 2 blood cultures, May 26, 2024, positive for

## 2024-05-27 NOTE — CONSULTS
Gastroenterology Consult Note    Patient:   Kavya De Santiago   Admit date:  5/26/2024  Facility:   [facility]  Referring/PCP: Shaina Hamilton MD  Date:     5/27/2024  Consultant:   ORLY [place] - PAO, Jolynn Hobbs MD    Subjective:     This 64 y.o. female was admitted 5/26/2024 with a diagnosis of \"Colitis [K52.9]\" and is seen in consultation regarding   Chief Complaint   Patient presents with    Loss of Consciousness     64/F w/pmhx of CKD, GERD, anxiety, HLD, HTN, OA, TIA, T2DM presents to ED for AMS, nausea, vomiting, diarrhea.  Patient currently intubated.  HPI obtained from chart.  It appears patient had URI symptoms for the past week and the developed nausea, vomiting, diarrhea the day at 4 am yesterday morning.  Unclear if she was having any melena, hematochezia, hematemesis.  Patient was found lying on floor by  and EMS was called.  On arrival GCS was 8-9, patient was intubated in the ED.  CT abd/pelvis showed pancolitis.  Lactate 3.3 down to 1.7 this am.  WBC 11.5.  BC drawn.  Patient has been febrile. Fever 104.8 yesterday. Last night she was hypothermic.  Again this morning she is febrile with temp of 102.6.   reports that she is a non-smoker, drinks alcohol infrequently, no drug use.     No BM since admission.  Patient was started on empiric treatment for C. Diff.    Remains intubated  Currently off pressors      Past Medical History:  Past Medical History:   Diagnosis Date    Cervical spondylosis 04/2009    c-4 c-5, c-5 c-6, c-6 c-7    CKD (chronic kidney disease)     per pt, does not have nephrologist    COVID-19 12/06/2021    nasal congestion, fatique and myalgia x 3 weeks    Fall     PATIENT FELL 2 WEEKS AGO LANDED ON Tempronics    Generalized anxiety disorder 12/08/2020    GERD (gastroesophageal reflux disease)     History of recent hospitalization 09/30/2022    til 10/9/22 at [facility]    History of swelling of feet     lasix QOD for this

## 2024-05-27 NOTE — PLAN OF CARE
Problem: Safety - Medical Restraint  Goal: Remains free of injury from restraints (Restraint for Interference with Medical Device)  Description: INTERVENTIONS:  1. Determine that other, less restrictive measures have been tried or would not be effective before applying the restraint  2. Evaluate the patient's condition at the time of restraint application  3. Inform patient/family regarding the reason for restraint  4. Q2H: Monitor safety, psychosocial status, comfort, nutrition and hydration  Outcome: Progressing  Flowsheets (Taken 5/26/2024 2000)  Remains free of injury from restraints (restraint for interference with medical device):   Determine that other, less restrictive measures have been tried or would not be effective before applying the restraint   Evaluate the patient's condition at the time of restraint application   Inform patient/family regarding the reason for restraint   Every 2 hours: Monitor safety, psychosocial status, comfort, nutrition and hydration     Problem: Safety - Adult  Goal: Free from fall injury  Outcome: Progressing     Problem: Discharge Planning  Goal: Discharge to home or other facility with appropriate resources  Outcome: Progressing     Problem: Pain  Goal: Verbalizes/displays adequate comfort level or baseline comfort level  Outcome: Progressing     Problem: Skin/Tissue Integrity  Goal: Absence of new skin breakdown  Description: 1.  Monitor for areas of redness and/or skin breakdown  2.  Assess vascular access sites hourly  3.  Every 4-6 hours minimum:  Change oxygen saturation probe site  4.  Every 4-6 hours:  If on nasal continuous positive airway pressure, respiratory therapy assess nares and determine need for appliance change or resting period.  Outcome: Progressing     Problem: ABCDS Injury Assessment  Goal: Absence of physical injury  Outcome: Progressing

## 2024-05-27 NOTE — PROGRESS NOTES
05/27/24 0915   Encounter Summary   Support System [employer] community  ([employer] [place])   Plan and Referrals   Plan/Referrals Contacted support as requested per patient/family request Plan  [facility] [place] [phone])

## 2024-05-27 NOTE — PROGRESS NOTES
05/27/24 1210   Encounter Summary   Encounter Overview/Reason Spiritual/Emotional Needs   Service Provided For Patient   Referral/Consult From Rounding   Assessment/Intervention/Outcome   Assessment Unable to assess   Intervention Prayer (assurance of)/[place]

## 2024-05-27 NOTE — PROGRESS NOTES
PAO Mayorga notified of continuous decrease in patients temperature despite multiple warm blankets. Orders received to apply bear hugger warming blanket.

## 2024-05-27 NOTE — FLOWSHEET NOTE
Labetalol infusion paused due to hypotension and heart rate of 60-65. Writer also decreased fentanyl from 75mcg to 50mcg.

## 2024-05-27 NOTE — PROGRESS NOTES
Vancomycin Dosing by Pharmacy - Daily Note   Vancomycin Therapy Day:  2  Indication: sepsis of unknown origin, empiric    Allergies:  Codeine   Actual Weight:    Wt Readings from Last 1 Encounters:   05/26/24 90.7 kg (200 lb)       Labs/Ancillary Data  Estimated Creatinine Clearance: 55 mL/min (A) (based on SCr of 1.1 mg/dL (H)).  Recent Labs     05/26/24  0854 05/27/24  0612   CREATININE 1.1*  --    BUN 20  --    WBC 11.5* 10.4     Procalcitonin   Date Value Ref Range Status   06/06/2022 0.26 (H) <0.09 ng/mL Final     Comment:           Suspected Sepsis:  <0.50 ng/mL     Low likelihood of sepsis.  0.50-2.00 ng/mL     Increased likelihood of sepsis. Antibiotics encouraged.  >2.00 ng/mL     High risk of sepsis/shock. Antibiotics strongly encouraged.        Suspected Lower Resp Tract Infections:  <0.24 ng/mL     Low likelihood of bacterial infection.  >0.24 ng/mL     Increased likelihood of bacterial infection. Antibiotics encouraged.        With successful antibiotic therapy, PCT levels should decrease rapidly. (Half-life of 24 to   36 hours.)        Procalcitonin values from samples collected within the first 6 hours of systemic infection   may still be low. Retesting may be indicated.  Values from day 1 and day 4 can be entered into the Change in Procalcitonin Calculator   (www.[employer]-pct-calculator.[employer]) to determine the patient's Mortality Risk Prognosis        In healthy neonates, plasma Procalcitonin (PCT) concentrations increase gradually after   birth, reaching peak values at about 24 hours of age then decrease to normal values below   0.5 ng/mL by 48-72 hours of age.         Intake/Output Summary (Last 24 hours) at 5/27/2024 0650  Last data filed at 5/27/2024 0532  Gross per 24 hour   Intake 3138.61 ml   Output 1525 ml   Net 1613.61 ml     Temp: 99.9    Culture Date / Source  /  Results  See micro  Recent vancomycin administrations                     vancomycin (VANCOCIN) 2,250 mg in sodium chloride 0.9 %  500 mL IVPB (mg) 2,250 mg New Bag 05/26/24 181                    Vancomycin Concentrations:   TROUGH:  No results for input(s): \"VANCOTROUGH\" in the last 72 hours.  RANDOM:  No results for input(s): \"VANCORANDOM\" in the last 72 hours.    MRSA Nasal Swab: N/A. Non-respiratory infection..    PLAN     Continue current dose of 1500 mg q24h IV  Ensured BUN/sCr ordered at baseline and every 48 hours x at least 3 levels, then at least weekly.  Repeat vancomycin concentration ordered for 05/28 @ 0600   Pharmacy will continue to monitor patient and adjust therapy as indicated      Vancomycin Target Concentration Parameters  Treatment  Population Target AUC/RADHA Target Trough   Invasive MRSA Infection (bacteremia, pneumonia, meningitis, endocarditis, osteomyelitis)  Sepsis (undifferentiated) 400-600 N/A   Infection due to non-MRSA pathogen  Empiric treatment of non-invasive MRSA infection  (SSTI, UTI) <500 10-15 mg/L   CrCl < 29 mL/min  Rapidly fluctuating serum creatinine   HAYLEY N/A < 15 mg/L     Renal replacement therapy is dosed by levels, per hospital protocol.  Abbreviations  * Pauc: probability that AUC is >400 (efficacy); Pconc: probability that Ctrough is above 20 ?g/mL (toxicity); Tox: Probability of nephrotoxicity, based on Lodise et al. Clin Infect Dis 2009.      Thank you for the consult.  Pharmacy will continue to follow.      Justin Rodriguez, Pharm.D.   [facility] in-patient pharmacy

## 2024-05-27 NOTE — ACP (ADVANCE CARE PLANNING)
Advance Care Planning     Advance Care Planning Activator (Inpatient)  Conversation Note      Date of ACP Conversation: 5/27/2024     Conversation Conducted with: Legal next of kin    ACP Activator: Patricia King RN    Health Care Decision Maker:     Current Designated Health Care Decision Maker:     Click here to complete Healthcare Decision Makers including section of the Healthcare Decision Maker Relationship (ie \"Primary\")  Today we documented Decision Maker(s) consistent with Legal Next of Kin hierarchy.    Care Preferences    Ventilation:  \"If you were in your present state of health and suddenly became very ill and were unable to breathe on your own, what would your preference be about the use of a ventilator (breathing machine) if it were available to you?\"      Would the patient desire the use of ventilator (breathing machine)?: yes    \"If your health worsens and it becomes clear that your chance of recovery is unlikely, what would your preference be about the use of a ventilator (breathing machine) if it were available to you?\"     Would the patient desire the use of ventilator (breathing machine)?: Yes      Resuscitation  \"CPR works best to restart the heart when there is a sudden event, like a heart attack, in someone who is otherwise healthy. Unfortunately, CPR does not typically restart the heart for people who have serious health conditions or who are very sick.\"    \"In the event your heart stopped as a result of an underlying serious health condition, would you want attempts to be made to restart your heart (answer \"yes\" for attempt to resuscitate) or would you prefer a natural death (answer \"no\" for do not attempt to resuscitate)?\" yes       [] Yes   [x] No   Educated Patient / Decision Maker regarding differences between Advance Directives and portable DNR orders.    Length of ACP Conversation in minutes:      Conversation Outcomes:  ACP discussion completed    Follow-up plan:    [] Schedule  follow-up conversation to continue planning  [x] Referred individual to Provider for additional questions/concerns   [] Advised patient/agent/surrogate to review completed ACP document and update if needed with changes in condition, patient preferences or care setting    [] This note routed to one or more involved healthcare providers

## 2024-05-27 NOTE — CONSULTS
[facility] -   Infectious diseases evaluation  admission date 5/26/2024    reason for consultation:   High fever, unknown etiology sepsis    Impression :   Current:  Haemophilus influenza bacteremia, likely upper respiratory source, rule out meningitis.  Sepsis secondary to above  Colitis  Diabetes mellitus  Chronic kidney disease  Hyperlipidemia  Hypertension  GERD  Pagan      HENCE:   Discontinue meropenem  IV ceftriaxone 2 g every 12  Spinal tap  Continue vancomycin for now  Stool for C. difficile and GI panel pending, no bowel movement since admission, was started on empirical treatment with oral .  Nasal swab for MRSA  Respiratory culture  Follow CBC and renal function  Continue supportive care  Discussed with nursing staff and  at the bedside    Infection Control Recommendations   [place] Precautions  Droplet Isolation      Antimicrobial Stewardship Recommendations   Simplification of therapy  Targeted therapy      History of Present Illness:   Initial history:  Kavya De Santiago is a 64 y.o.-year-old female was brought to the hospital for altered mental status associated with nausea, vomiting and diarrhea.  The patient is intubated, unable to provide history that was obtained from chart review and  at the bedside had upper respiratory symptoms, congestion, headache and rhinorrhea for 1 week associated with dry cough and decreased appetite.  The patient was found on the ground on the day of admission, EMS called.  The patient was intubated at the ER.  The patient was hypothermic initially then has been running high-grade fever with temperature max of 105, hypotensive, on pressors.  CT head showed no acute abnormality, CTA chest showed no evidence of PE, CT abdomen pelvis showed pancolitis with abnormal wall thickening of the ascending, transverse, descending colon, sigmoid colon and rectum.   Initial lactic acid 3.3, WBC 11.5, procalcitonin

## 2024-05-27 NOTE — PROGRESS NOTES
Dr. Burris notified of patients hypotension despite turning the labetalol off and decreasing sedation. Physician would like to start levophed with a goal of MAP >65 and SBP> 100  and give  1000ml normal saline 0.9 over an hour. He would also like to check a CVP.     Physician notified of patients drop in temperature 35.5 C, no new orders.

## 2024-05-27 NOTE — PROGRESS NOTES
MECHANICAL VENTILATION  [x]  PROVIDE OPTIMAL VENTILATION  [x]   ASSESS FOR EXTUBATION READINESS  [x]   ASSESS FOR WEANING READINESS  []  EXTUBATE AS TOLERATED  [x]  IMPLEMENT ADULT MECHANICAL VENTILATION PROTOCOL  [x]  MAINTAIN ADEQUATE OXYGENATION  []  PERFORM SPONTANEOUS WEANING TRIAL AS TOLERATED    PROVIDE ADEQUATE OXYGENATION WITH ACCEPTABLE SP02/ABG'S  [x]  IDENTIFY APPROPRIATE OXYGEN THERAPY  [x]   MONITOR SP02/ABG'S AS NEEDED   [x]   PATIENT EDUCATION AS NEEDED

## 2024-05-27 NOTE — CARE COORDINATION
Case Management Assessment  Initial Evaluation    Date/Time of Evaluation: 5/27/2024 12:50 PM  Assessment Completed by: Patricia King RN    If patient is discharged prior to next notation, then this note serves as note for discharge by case management.    Patient Name: Kavya De Santiago                   YOB: 1960  Diagnosis: Colitis [K52.9]                   Date / Time: 5/26/2024  8:51 AM    Patient Admission Status: Inpatient   Readmission Risk (Low < 19, Mod (19-27), High > 27): Readmission Risk Score: 15.3    Current PCP: Shaina Hamilton MD  PCP verified by CM? Yes    Chart Reviewed: Yes      History Provided by: Spouse  Patient Orientation: Unable to Assess, Sedated    Patient Cognition: Other (see comment) (VENT)    Hospitalization in the last 30 days (Readmission):  No    If yes, Readmission Assessment in  Navigator will be completed.    Advance Directives:      Code Status: Full Code   Patient's Primary Decision Maker is: Legal Next of Kin      Discharge Planning:    Patient lives with: Spouse/Significant Other Type of Home: Apartment  Primary Care Giver: Self  Patient Support Systems include: Spouse/Significant Other   Current Financial resources: None  Current community resources: None  Current services prior to admission: Durable Medical Equipment            Current DME: Cane, Glucometer            Type of Home Care services:  PT, OT, Nursing Services    ADLS  Prior functional level: Independent in ADLs/IADLs  Current functional level: Independent in ADLs/IADLs    PT AM-PAC:   /24  OT AM-PAC:   /24    Family can provide assistance at DC: Yes  Would you like Case Management to discuss the discharge plan with any other family members/significant others, and if so, who? Yes (Spouse; Rj)  Plans to Return to Present Housing: Yes  Other Identified Issues/Barriers to RETURNING to current housing: Sepsis  Potential Assistance needed at discharge: Skilled Nursing Facility

## 2024-05-27 NOTE — PROGRESS NOTES
[facility]  IN-PATIENT SERVICE  [facility]    PROGRESS NOTE             5/27/2024    9:14 AM    Name:   Kavya De Santiago  MRN:     [id number]     Acct:      [id number]   Room:   2003/2003-01   Day:  1  Admit Date:  5/26/2024  8:51 AM    PCP:  Shaina Hamilton MD  Code Status:  Full Code    SUBJECTIVE     Status: worsened.    Interval History:    Patient seen and examined at bedside this morning.  Still sedated, intubated, does open her eyes with stimulation.  Was started on Levophed yesterday evening due to hypotension, currently off Levophed, blood pressure stable. Was hypothermic yesterday, given cooling blankets, became hypothermic, started on Robb hugger, discontinued this morning. Currently febrile again.  Lactate elevated yesterday, 3.3, repeat 3.4, repeat normal. Currently on IV normal saline at 100 mL/hr. blood cultures, urine culture, respiratory culture pending. Glucose elevated in the 300s overnight, added insulin Lantus 5 units, medium dose sliding scale. On meropenem, IV and oral vancomycin, Flagyl added by GI this morning.  ID consulted.    Brief History:    64-year-old female with history of T2DM, HTN, HLD, CKD, GERD, DIEGO, obesity who was brought to the ED for AMS, nausea, vomiting, diarrhea.  Patient is intubated at the time of my visit, her  Rj is present to answer questions.  Reportedly patient had some upper respiratory symptoms over the past week or so, with a dry cough, decreased appetite, rhinorrhea, congestion, headache/sinus pain.  This morning at 4 AM patient began to have diarrhea,  is unsure if it was bloody or not, and she also had several episodes of vomiting.  When he checked on her he found her on the ground, EMS was called, they arrived she has a GCS was 8-9, O2 saturations dropped on the way to the ED, given etomidate and Versed and placed a supraglottic airway, on arrival to the ED she was intubated.   CT head  showed no acute abnormality, CTA chest showed no evidence of PE, CT abdomen pelvis showed pancolitis with abnormal wall thickening of the ascending, transverse, descending colon, sigmoid colon and rectum.  Lactate elevated 3.3.  Magnesium 0.9, replaced.  Leukocytosis with WBC 11.5.  Started on piperacillin/tazobactam.  Chest x-ray showed left basilar atelectasis/scarring versus infiltrate and/or small effusion. Blood sugar elevated 439 on arrival, ABG showed no acidosis, anion gap normal, slightly elevated beta hydroxybutyrate 0.29.  COVID-19 and influenza swab negative.  Blood cultures collected and are pending.  Troponin normal.  Acetaminophen and salicylate levels normal.   reports that she is a non-smoker, drinks alcohol infrequently, no drug use.  Was not complaining of any chest pain, SOB.     Review of Systems:    Review of Systems   Reason unable to perform ROS: Patient intubated, sedated.     Scheduled Meds:      metroNIDAZOLE  500 mg IntraVENous Q8H    insulin glargine  5 Units SubCUTAneous Daily    insulin lispro  0-8 Units SubCUTAneous TID WC    insulin lispro  0-4 Units SubCUTAneous Nightly    aspirin  81 mg Oral Daily    atorvastatin  40 mg Oral Nightly    latanoprost  1 drop Both Eyes Nightly    busPIRone  10 mg Oral TID    venlafaxine  75 mg Oral Daily    rOPINIRole  4 mg Oral Nightly    [Held by provider] propranolol  80 mg Oral Daily    pantoprazole  40 mg Oral QAM AC    sodium chloride flush  5-40 mL IntraVENous 2 times per day    enoxaparin  40 mg SubCUTAneous Daily    vancomycin  125 mg Oral 4x Daily    meropenem  1,000 mg IntraVENous Q8H    vancomycin (VANCOCIN) intermittent dosing (placeholder)   Other RX Placeholder    vancomycin  1,500 mg IntraVENous Q24H    sodium chloride  500 mL IntraVENous Once     Continuous Infusions:      norepinephrine 2 mcg/min (05/27/24 0640)    propofol Stopped (05/26/24 1255)    midazolam 2 mg/hr (05/27/24 0049)    fentaNYL 50 mcg/hr (05/27/24 0829)

## 2024-05-28 ENCOUNTER — APPOINTMENT (OUTPATIENT)
Dept: GENERAL RADIOLOGY | Age: 64
End: 2024-05-28
Payer: COMMERCIAL

## 2024-05-28 ENCOUNTER — APPOINTMENT (OUTPATIENT)
Age: 64
End: 2024-05-28
Attending: INTERNAL MEDICINE
Payer: COMMERCIAL

## 2024-05-28 LAB
ABO + RH BLD: NORMAL
ANION GAP SERPL CALCULATED.3IONS-SCNC: 11 MMOL/L (ref 9–17)
ARM BAND NUMBER: NORMAL
ARTERIAL PATENCY WRIST A: ABNORMAL
BASOPHILS # BLD: 0 K/UL (ref 0–0.2)
BASOPHILS NFR BLD: 0 % (ref 0–2)
BDY SITE: ABNORMAL
BLOOD BANK SAMPLE EXPIRATION: NORMAL
BLOOD GROUP ANTIBODIES SERPL: NEGATIVE
BODY TEMPERATURE: 36.6
BUN SERPL-MCNC: 13 MG/DL (ref 8–23)
CALCIUM SERPL-MCNC: 6.8 MG/DL (ref 8.6–10.4)
CHLORIDE SERPL-SCNC: 110 MMOL/L (ref 98–107)
CO2 SERPL-SCNC: 18 MMOL/L (ref 20–31)
COHGB MFR BLD: 1 % (ref 0–5)
CREAT SERPL-MCNC: 0.7 MG/DL (ref 0.5–0.9)
DATE LAST DOSE: NORMAL
ECHO AO ROOT DIAM: 2.7 CM
ECHO AV AREA PEAK VELOCITY: 1.1 CM2
ECHO AV AREA VTI: 1.2 CM2
ECHO AV MEAN GRADIENT: 13 MMHG
ECHO AV MEAN VELOCITY: 1.7 M/S
ECHO AV PEAK GRADIENT: 20 MMHG
ECHO AV PEAK VELOCITY: 2.2 M/S
ECHO AV VELOCITY RATIO: 0.41
ECHO AV VTI: 39.6 CM
ECHO EST RA PRESSURE: 8 MMHG
ECHO LA AREA 2C: 24.5 CM2
ECHO LA AREA 4C: 18.7 CM2
ECHO LA DIAMETER: 4 CM
ECHO LA MAJOR AXIS: 5.7 CM
ECHO LA MINOR AXIS: 6.3 CM
ECHO LA TO AORTIC ROOT RATIO: 1.48
ECHO LA VOL BP: 64 ML (ref 22–52)
ECHO LA VOL MOD A2C: 77 ML (ref 22–52)
ECHO LA VOL MOD A4C: 50 ML (ref 22–52)
ECHO LV E' LATERAL VELOCITY: 13 CM/S
ECHO LV E' SEPTAL VELOCITY: 9 CM/S
ECHO LV FRACTIONAL SHORTENING: 30 % (ref 28–44)
ECHO LV INTERNAL DIMENSION DIASTOLIC: 4.7 CM (ref 3.9–5.3)
ECHO LV INTERNAL DIMENSION SYSTOLIC: 3.3 CM
ECHO LV IVSD: 1 CM (ref 0.6–0.9)
ECHO LV MASS 2D: 164.5 G (ref 67–162)
ECHO LV POSTERIOR WALL DIASTOLIC: 1 CM (ref 0.6–0.9)
ECHO LV RELATIVE WALL THICKNESS RATIO: 0.43
ECHO LVOT AREA: 2.5 CM2
ECHO LVOT AV VTI INDEX: 0.47
ECHO LVOT DIAM: 1.8 CM
ECHO LVOT MEAN GRADIENT: 2 MMHG
ECHO LVOT PEAK GRADIENT: 4 MMHG
ECHO LVOT PEAK VELOCITY: 0.9 M/S
ECHO LVOT SV: 47.1 ML
ECHO LVOT VTI: 18.5 CM
ECHO MV AREA VTI: 1.3 CM2
ECHO MV E DECELERATION TIME (DT): 229 MS
ECHO MV E VELOCITY: 1.16 M/S
ECHO MV E/E' LATERAL: 8.92
ECHO MV E/E' RATIO (AVERAGED): 10.91
ECHO MV E/E' SEPTAL: 12.89
ECHO MV LVOT VTI INDEX: 1.94
ECHO MV MAX VELOCITY: 1.5 M/S
ECHO MV MEAN GRADIENT: 3 MMHG
ECHO MV MEAN VELOCITY: 0.7 M/S
ECHO MV PEAK GRADIENT: 9 MMHG
ECHO MV VTI: 35.9 CM
ECHO RA AREA 4C: 14.3 CM2
ECHO RA VOLUME: 33 ML
ECHO RIGHT VENTRICULAR SYSTOLIC PRESSURE (RVSP): 52 MMHG
ECHO RV TAPSE: 2.1 CM (ref 1.7–?)
ECHO TV PEAK GRADIENT: 10 MMHG
ECHO TV REGURGITANT MAX VELOCITY: 3.3 M/S
ECHO TV REGURGITANT PEAK GRADIENT: 44 MMHG
EOSINOPHIL # BLD: 0.1 K/UL (ref 0–0.4)
EOSINOPHILS RELATIVE PERCENT: 1 % (ref 0–4)
ERYTHROCYTE [DISTWIDTH] IN BLOOD BY AUTOMATED COUNT: 14 % (ref 11.5–14.9)
FIO2 ON VENT: 30 %
GAS FLOW.O2 O2 DELIVERY SYS: ABNORMAL L/MIN
GAS FLOW.O2 SETTING OXYMISER: 20 L/MIN
GFR, ESTIMATED: >90 ML/MIN/1.73M2
GLUCOSE BLD-MCNC: 183 MG/DL (ref 65–105)
GLUCOSE BLD-MCNC: 187 MG/DL (ref 65–105)
GLUCOSE BLD-MCNC: 190 MG/DL (ref 65–105)
GLUCOSE SERPL-MCNC: 207 MG/DL (ref 70–99)
HCO3 ARTERIAL: 21 MMOL/L (ref 22–26)
HCT VFR BLD AUTO: 26.1 % (ref 36–46)
HGB BLD-MCNC: 8.1 G/DL (ref 12–16)
LYMPHOCYTES NFR BLD: 1 K/UL (ref 1–4.8)
LYMPHOCYTES RELATIVE PERCENT: 9 % (ref 24–44)
MCH RBC QN AUTO: 27 PG (ref 26–34)
MCHC RBC AUTO-ENTMCNC: 31.3 G/DL (ref 31–37)
MCV RBC AUTO: 86.4 FL (ref 80–100)
METHEMOGLOBIN: 1.2 % (ref 0–1.9)
MICROORGANISM SPEC CULT: ABNORMAL
MONOCYTES NFR BLD: 0.9 K/UL (ref 0.1–1.3)
MONOCYTES NFR BLD: 8 % (ref 1–7)
MRSA, DNA, NASAL: NEGATIVE
NEGATIVE BASE EXCESS, ART: 5 MMOL/L (ref 0–2)
NEUTROPHILS NFR BLD: 82 % (ref 36–66)
NEUTS SEG NFR BLD: 9.2 K/UL (ref 1.3–9.1)
O2 SAT, ARTERIAL: 94.1 % (ref 95–98)
PARTIAL THROMBOPLASTIN TIME: 31.2 SEC (ref 24–36)
PCO2 ARTERIAL: 40 MMHG (ref 35–45)
PEEP RESPIRATORY: 7 CM[H2O]
PH ARTERIAL: 7.33 (ref 7.35–7.45)
PLATELET # BLD AUTO: 199 K/UL (ref 150–450)
PMV BLD AUTO: 8.3 FL (ref 6–12)
PO2 ARTERIAL: 84 MMHG (ref 80–100)
POTASSIUM SERPL-SCNC: 3.5 MMOL/L (ref 3.7–5.3)
PT. POSITION: ABNORMAL
RBC # BLD AUTO: 3.02 M/UL (ref 4–5.2)
SERVICE CMNT-IMP: ABNORMAL
SODIUM SERPL-SCNC: 139 MMOL/L (ref 135–144)
SPECIMEN DESCRIPTION: ABNORMAL
SPECIMEN DESCRIPTION: NORMAL
TEXT FOR RESPIRATORY: ABNORMAL
TME LAST DOSE: 1810 H
TOTAL RATE: 20
VANCOMYCIN DOSE: 1500 MG
VANCOMYCIN SERPL-MCNC: 12.5 UG/ML
VENTILATION MODE VENT: ABNORMAL
VT: 500
WBC OTHER # BLD: 11.2 K/UL (ref 3.5–11)

## 2024-05-28 PROCEDURE — 86850 RBC ANTIBODY SCREEN: CPT

## 2024-05-28 PROCEDURE — 6360000002 HC RX W HCPCS: Performed by: INTERNAL MEDICINE

## 2024-05-28 PROCEDURE — 2700000000 HC OXYGEN THERAPY PER DAY

## 2024-05-28 PROCEDURE — 71045 X-RAY EXAM CHEST 1 VIEW: CPT

## 2024-05-28 PROCEDURE — 85730 THROMBOPLASTIN TIME PARTIAL: CPT

## 2024-05-28 PROCEDURE — 6360000002 HC RX W HCPCS

## 2024-05-28 PROCEDURE — 99233 SBSQ HOSP IP/OBS HIGH 50: CPT | Performed by: INTERNAL MEDICINE

## 2024-05-28 PROCEDURE — 86901 BLOOD TYPING SEROLOGIC RH(D): CPT

## 2024-05-28 PROCEDURE — 6370000000 HC RX 637 (ALT 250 FOR IP)

## 2024-05-28 PROCEDURE — 94761 N-INVAS EAR/PLS OXIMETRY MLT: CPT

## 2024-05-28 PROCEDURE — 2000000000 HC ICU R&B

## 2024-05-28 PROCEDURE — 80202 ASSAY OF VANCOMYCIN: CPT

## 2024-05-28 PROCEDURE — 86900 BLOOD TYPING SEROLOGIC ABO: CPT

## 2024-05-28 PROCEDURE — 2580000003 HC RX 258

## 2024-05-28 PROCEDURE — 93306 TTE W/DOPPLER COMPLETE: CPT | Performed by: INTERNAL MEDICINE

## 2024-05-28 PROCEDURE — 85025 COMPLETE CBC W/AUTO DIFF WBC: CPT

## 2024-05-28 PROCEDURE — 93306 TTE W/DOPPLER COMPLETE: CPT

## 2024-05-28 PROCEDURE — 82805 BLOOD GASES W/O2 SATURATION: CPT

## 2024-05-28 PROCEDURE — 2580000003 HC RX 258: Performed by: INTERNAL MEDICINE

## 2024-05-28 PROCEDURE — 82947 ASSAY GLUCOSE BLOOD QUANT: CPT

## 2024-05-28 PROCEDURE — 36600 WITHDRAWAL OF ARTERIAL BLOOD: CPT

## 2024-05-28 PROCEDURE — 6370000000 HC RX 637 (ALT 250 FOR IP): Performed by: INTERNAL MEDICINE

## 2024-05-28 PROCEDURE — 94003 VENT MGMT INPAT SUBQ DAY: CPT

## 2024-05-28 PROCEDURE — APPSS30 APP SPLIT SHARED TIME 16-30 MINUTES: Performed by: NURSE PRACTITIONER

## 2024-05-28 PROCEDURE — 36415 COLL VENOUS BLD VENIPUNCTURE: CPT

## 2024-05-28 PROCEDURE — 80048 BASIC METABOLIC PNL TOTAL CA: CPT

## 2024-05-28 PROCEDURE — 87040 BLOOD CULTURE FOR BACTERIA: CPT

## 2024-05-28 RX ORDER — VANCOMYCIN HYDROCHLORIDE 125 MG/1
125 CAPSULE ORAL 4 TIMES DAILY
Status: DISCONTINUED | OUTPATIENT
Start: 2024-05-28 | End: 2024-05-30

## 2024-05-28 RX ORDER — INSULIN LISPRO 100 [IU]/ML
0-8 INJECTION, SOLUTION INTRAVENOUS; SUBCUTANEOUS EVERY 4 HOURS
Status: DISCONTINUED | OUTPATIENT
Start: 2024-05-28 | End: 2024-06-12

## 2024-05-28 RX ORDER — FENTANYL CITRATE 0.05 MG/ML
50 INJECTION, SOLUTION INTRAMUSCULAR; INTRAVENOUS EVERY 30 MIN PRN
Status: DISCONTINUED | OUTPATIENT
Start: 2024-05-28 | End: 2024-06-04

## 2024-05-28 RX ORDER — SODIUM CHLORIDE 9 MG/ML
INJECTION, SOLUTION INTRAVENOUS CONTINUOUS
Status: DISCONTINUED | OUTPATIENT
Start: 2024-05-28 | End: 2024-06-01

## 2024-05-28 RX ADMIN — BUSPIRONE HYDROCHLORIDE 10 MG: 10 TABLET ORAL at 13:32

## 2024-05-28 RX ADMIN — VANCOMYCIN HYDROCHLORIDE 125 MG: KIT at 13:33

## 2024-05-28 RX ADMIN — VANCOMYCIN HYDROCHLORIDE 125 MG: KIT at 17:59

## 2024-05-28 RX ADMIN — PROPOFOL 40 MCG/KG/MIN: 10 INJECTION, EMULSION INTRAVENOUS at 21:12

## 2024-05-28 RX ADMIN — VANCOMYCIN HYDROCHLORIDE 1500 MG: 1.5 INJECTION, POWDER, LYOPHILIZED, FOR SOLUTION INTRAVENOUS at 17:03

## 2024-05-28 RX ADMIN — SODIUM CHLORIDE, PRESERVATIVE FREE 10 ML: 5 INJECTION INTRAVENOUS at 20:31

## 2024-05-28 RX ADMIN — BUSPIRONE HYDROCHLORIDE 10 MG: 10 TABLET ORAL at 07:44

## 2024-05-28 RX ADMIN — VANCOMYCIN HYDROCHLORIDE 125 MG: 125 CAPSULE ORAL at 20:34

## 2024-05-28 RX ADMIN — VANCOMYCIN HYDROCHLORIDE 125 MG: KIT at 07:51

## 2024-05-28 RX ADMIN — FENTANYL CITRATE 50 MCG: 0.05 INJECTION, SOLUTION INTRAMUSCULAR; INTRAVENOUS at 22:40

## 2024-05-28 RX ADMIN — PANTOPRAZOLE SODIUM 40 MG: 40 TABLET, DELAYED RELEASE ORAL at 07:45

## 2024-05-28 RX ADMIN — SODIUM CHLORIDE: 9 INJECTION, SOLUTION INTRAVENOUS at 16:06

## 2024-05-28 RX ADMIN — LATANOPROST 1 DROP: 50 SOLUTION OPHTHALMIC at 19:21

## 2024-05-28 RX ADMIN — FENTANYL CITRATE 50 MCG: 0.05 INJECTION, SOLUTION INTRAMUSCULAR; INTRAVENOUS at 20:31

## 2024-05-28 RX ADMIN — POTASSIUM BICARBONATE 40 MEQ: 782 TABLET, EFFERVESCENT ORAL at 11:59

## 2024-05-28 RX ADMIN — PROPOFOL 20 MCG/KG/MIN: 10 INJECTION, EMULSION INTRAVENOUS at 09:26

## 2024-05-28 RX ADMIN — BUSPIRONE HYDROCHLORIDE 10 MG: 10 TABLET ORAL at 19:21

## 2024-05-28 RX ADMIN — ACETAMINOPHEN 650 MG: 650 SUPPOSITORY RECTAL at 01:47

## 2024-05-28 RX ADMIN — ASPIRIN 81 MG: 81 TABLET, COATED ORAL at 07:45

## 2024-05-28 RX ADMIN — SODIUM CHLORIDE: 9 INJECTION, SOLUTION INTRAVENOUS at 07:33

## 2024-05-28 RX ADMIN — ACETAMINOPHEN 650 MG: 650 SUPPOSITORY RECTAL at 20:23

## 2024-05-28 RX ADMIN — CEFTRIAXONE SODIUM 2000 MG: 2 INJECTION, POWDER, FOR SOLUTION INTRAMUSCULAR; INTRAVENOUS at 15:49

## 2024-05-28 RX ADMIN — PROPOFOL 40 MCG/KG/MIN: 10 INJECTION, EMULSION INTRAVENOUS at 15:50

## 2024-05-28 RX ADMIN — SODIUM CHLORIDE, PRESERVATIVE FREE 10 ML: 5 INJECTION INTRAVENOUS at 07:34

## 2024-05-28 RX ADMIN — ATORVASTATIN CALCIUM 40 MG: 40 TABLET, FILM COATED ORAL at 19:21

## 2024-05-28 RX ADMIN — ACETAMINOPHEN 650 MG: 325 TABLET ORAL at 12:05

## 2024-05-28 RX ADMIN — FENTANYL CITRATE 50 MCG: 0.05 INJECTION, SOLUTION INTRAMUSCULAR; INTRAVENOUS at 17:00

## 2024-05-28 RX ADMIN — CEFTRIAXONE SODIUM 2000 MG: 2 INJECTION, POWDER, FOR SOLUTION INTRAMUSCULAR; INTRAVENOUS at 05:00

## 2024-05-28 RX ADMIN — INSULIN GLARGINE 5 UNITS: 100 INJECTION, SOLUTION SUBCUTANEOUS at 07:51

## 2024-05-28 ASSESSMENT — PULMONARY FUNCTION TESTS
PIF_VALUE: 19
PIF_VALUE: 30
PIF_VALUE: 18
PIF_VALUE: 29
PIF_VALUE: 16
PIF_VALUE: 25
PIF_VALUE: 13
PIF_VALUE: 24
PIF_VALUE: 13
PIF_VALUE: 21
PIF_VALUE: 25
PIF_VALUE: 20
PIF_VALUE: 10
PIF_VALUE: 29
PIF_VALUE: 22
PIF_VALUE: 26
PIF_VALUE: 20
PIF_VALUE: 11
PIF_VALUE: 32
PIF_VALUE: 21
PIF_VALUE: 8
PIF_VALUE: 19
PIF_VALUE: 38
PIF_VALUE: 23
PIF_VALUE: 21
PIF_VALUE: 21
PIF_VALUE: 15
PIF_VALUE: 25
PIF_VALUE: 23
PIF_VALUE: 26
PIF_VALUE: 17
PIF_VALUE: 30

## 2024-05-28 NOTE — PROGRESS NOTES
Comprehensive Nutrition Assessment    Type and Reason for Visit:  Initial    Nutrition Recommendations/Plan:   Glucerna 1.5 to start just at 15 ml/hr.  Follow for nutrition progression.     Malnutrition Assessment:  Malnutrition Status:  Insufficient data (05/28/24 1501)    Context:  Acute Illness     Findings of the 6 clinical characteristics of malnutrition:  Energy Intake:  Mild decrease in energy intake (Comment)  Weight Loss:  Unable to assess     Body Fat Loss:  Unable to assess     Muscle Mass Loss:  Unable to assess    Fluid Accumulation:  Moderate to Severe Extremities   Strength:  Not Performed    Nutrition Assessment:    Pt had a history of being sick for approximately 1 week PTA, last 24 hours PTA had nausea, vomiting, diarrhea unable to keep anything down.  While pt was being transported to ED became completely unresponsive, sedated with supraglottic airway placed.  Today Dr. Huffman ordered trickle feeds, Glucerna 1.5 at 15 ml/hr with 200 ml water every 6 hours.    Nutrition Related Findings:    Edema: +2 pitting all extremities.  Hx: type 2 diabetes, anxiety, HTN, HLD, DIEGO.  Labs & meds reviewed. Wound Type: None       Current Nutrition Intake & Therapies:    Average Meal Intake: NPO     Current Tube Feeding (TF) Orders:  Feeding Route: Orogastric  Formula: Diabetic  Schedule: Continuous  Feeding Regimen: 15 ml only  Water Flushes: 200 ml every 6 hours per MD  Current TF & Flush Orders Provides: 540 kcals, 30 gm protein      Anthropometric Measures:  Height: 157.5 cm (5' 2\")  Ideal Body Weight (IBW): 110 lbs (50 kg)    Admission Body Weight: 90.7 kg (200 lb)  Current Body Weight: 90.7 kg (200 lb), 181.8 % IBW. Weight Source: Not Specified  Current BMI (kg/m2): 36.6                          BMI Categories: Obese Class 2 (BMI 35.0 -39.9)    Estimated Daily Nutrient Needs:  Energy Requirements Based On: Formula  Weight Used for Energy Requirements: Admission  Energy (kcal/day): 7673-2589 kcals  based on [facility] with 1.3-1.4 factor  Weight Used for Protein Requirements: Ideal  Protein (g/day):  gm protein based on 1.8-2 gm/kg  Method Used for Fluid Requirements: Other (Comment) (per MD)    Nutrition Diagnosis:   Inadequate oral intake related to impaired respiratory function as evidenced by NPO or clear liquid status due to medical condition, intubation    Nutrition Interventions:   Food and/or Nutrient Delivery: Continue NPO, Start Tube Feeding  Nutrition Education/Counseling: No recommendation at this time  Coordination of Nutrition Care: Continue to monitor while inpatient       Goals:     Goals: Tolerate nutrition support at goal rate       Nutrition Monitoring and Evaluation:      Food/Nutrient Intake Outcomes: Enteral Nutrition Intake/Tolerance  Physical Signs/Symptoms Outcomes: Biochemical Data, Nutrition Focused Physical Findings, Skin, Weight    Discharge Planning:    Too soon to determine     Josee Stahl RD, LD  [phone]

## 2024-05-28 NOTE — PROGRESS NOTES
chloride, potassium chloride **OR** potassium alternative oral replacement **OR** potassium chloride, magnesium sulfate, ondansetron **OR** ondansetron, polyethylene glycol, acetaminophen **OR** acetaminophen, glucose, dextrose bolus **OR** dextrose bolus, glucagon (rDNA), dextrose, labetalol    Allergies:      Allergies   Allergen Reactions    Codeine Nausea Only     Past Medical History:     has a past medical history of Cervical spondylosis, CKD (chronic kidney disease), COVID-19, Fall, Generalized anxiety disorder, GERD (gastroesophageal reflux disease), History of recent hospitalization, History of swelling of feet, Hyperlipidemia, Hypertension, Left hand weakness, Lumbar radiculopathy, DIEGO (nonalcoholic steatohepatitis), Obesity, Osteoarthritis of left knee, Recurrent major depressive disorder (HCC), Restless legs syndrome, Snores, TIA (transient ischemic attack), Type II or unspecified type diabetes mellitus without mention of complication, not stated as uncontrolled, Wears glasses, and Wellness examination.    Social History:     reports that she has never smoked. She has never used smokeless tobacco. She reports current alcohol use. She reports that she does not use drugs.     Family History:    Family History   Problem Relation Age of Onset    Heart Attack Mother     Diabetes Mother     Diabetes Father     Heart Attack Father      OBJECTIVE        Vitals:    /64   Pulse 93   Temp 99.7 °F (37.6 °C)   Resp 18   Wt 90.7 kg (200 lb)   SpO2 100%   BMI 36.57 kg/m²   Temp (24hrs), Av.2 °F (38.4 °C), Min:97.7 °F (36.5 °C), Max:104.2 °F (40.1 °C)    Recent Labs     24  1055 24  1606 24  2055 24  0702   POCGLU 231* 181* 175* 190*       I/O (24 hr):    Intake/Output Summary (Last 24 hours) at 2024 0856  Last data filed at 2024 0734  Gross per 24 hour   Intake 1706.02 ml   Output 925 ml   Net 781.02 ml       Physical Exam:    Physical Exam  Constitutional:        Appearance: She is overweight.      Interventions: She is sedated and intubated.   HENT:      Head: Normocephalic and atraumatic.   Cardiovascular:      Rate and Rhythm: Normal rate and regular rhythm.      Pulses: Normal pulses.   Pulmonary:      Effort: Pulmonary effort is normal. She is intubated.      Breath sounds: Normal breath sounds.   Abdominal:      General: There is no distension.      Palpations: Abdomen is soft. There is no mass.   Skin:     General: Skin is warm and dry.      Capillary Refill: Capillary refill takes less than 2 seconds.   Neurological:      Mental Status: She is easily aroused.     Labs:    [unfilled]  Lab Results   Component Value Date/Time    SPECIAL NOT REPORTED 10/07/2021 06:24 PM     Lab Results   Component Value Date/Time    CULTURE NORMAL RESPIRATORY NICK MODERATE GROWTH 05/26/2024 07:49 PM     [unfilled]    Radiology:    XR CHEST PORTABLE    Result Date: 5/27/2024  EXAMINATION: ONE XRAY VIEW OF THE CHEST 5/27/2024 6:21 am COMPARISON: 26 May 2024 HISTORY: ORDERING SYSTEM PROVIDED HISTORY: vent TECHNOLOGIST PROVIDED HISTORY: vent Reason for Exam: vent FINDINGS: AP portable view of the chest at 611 hours demonstrates an endotracheal tube terminating 2.5 cm above the mason, intestinal tube extending in the distal stomach and a right IJ catheter terminating at the cavoatrial junction. Cardiomegaly and vascular congestion are noted with left effusion and left basilar opacity likely atelectasis.  This represents interval change from prior study.  No extrapleural air is seen.     1. Cardiomegaly and vascular congestion with left effusion and left basilar opacity likely atelectasis. This represents interval change from prior study. 2. Support tubes and lines as above.     XR CHEST PORTABLE    Result Date: 5/26/2024  EXAMINATION: ONE XRAY VIEW OF THE CHEST 5/26/2024 5:32 pm COMPARISON: 05/26/2024 at 14:55 HISTORY: ORDERING SYSTEM PROVIDED HISTORY: central line placement TECHNOLOGIST

## 2024-05-28 NOTE — CARE COORDINATION
At this time,patent meets LTAC criteria.    Electronically signed by Caridad August RN on 5/28/2024 at 11:53 AM

## 2024-05-28 NOTE — PROGRESS NOTES
[facility] -   Infectious diseases evaluation  admission date 5/26/2024    reason for consultation:   High fever, unknown etiology sepsis    Impression :   Current:  Haemophilus influenza bacteremia, likely upper respiratory source, rule out meningitis.  Sepsis secondary to above  Colitis  Diabetes mellitus  Chronic kidney disease  Hyperlipidemia  Hypertension  GERD  Pagan      HENCE:     IV ceftriaxone 2 g every 12  Spinal tap tomorrow/IR not obtainable  Continue vancomycin for now  Stool for C. difficile and GI panel pending, no bowel movement since admission, was started on empirical treatment with oral vancomycin.  Nasal swab for MRSA  Respiratory culture  Follow CBC and renal function  Continue supportive care      Infection Control Recommendations   [place] Precautions  Droplet Isolation      Antimicrobial Stewardship Recommendations   Simplification of therapy  Targeted therapy      History of Present Illness:   Initial history:  Kavya De Santiago is a 64 y.o.-year-old female was brought to the hospital for altered mental status associated with nausea, vomiting and diarrhea.  The patient is intubated, unable to provide history that was obtained from chart review and  at the bedside had upper respiratory symptoms, congestion, headache and rhinorrhea for 1 week associated with dry cough and decreased appetite.  The patient was found on the ground on the day of admission, EMS called.  The patient was intubated at the ER.  The patient was hypothermic initially then has been running high-grade fever with temperature max of 105, hypotensive, on pressors.  CT head showed no acute abnormality, CTA chest showed no evidence of PE, CT abdomen pelvis showed pancolitis with abnormal wall thickening of the ascending, transverse, descending colon, sigmoid colon and rectum.   Initial lactic acid 3.3, WBC 11.5, procalcitonin 11.4  COVID-19 influenza combo  at all   Food Insecurity: Not on file (11/14/2023)   Transportation Needs: Unknown (11/14/2023)    PRAPARE - Transportation     Lack of Transportation (Medical): Not on file     Lack of Transportation (Non-Medical): No   Physical Activity: Unknown (9/30/2022)    Exercise Vital Sign     Days of Exercise per Week: 0 days     Minutes of Exercise per Session: Not on file   Stress: Stress Concern Present (9/30/2022)    [employer] - Occupational Stress Questionnaire     Feeling of Stress : To some extent   Social Connections: Socially Integrated (9/30/2022)    Social Connection and Isolation Panel [NHANES]     Frequency of Communication with Friends and Family: More than three times a week     Frequency of Social Gatherings with Friends and Family: Once a week     Attends Tenri[employer] Services: More than 4 times per year     Active Member of Clubs or Organizations: Yes     Attends Club or Organization Meetings: More than 4 times per year     Marital Status:    Intimate Partner Violence: Not At Risk (9/30/2022)    Humiliation, Afraid, Rape, and Kick questionnaire     Fear of Current or Ex-Partner: No     Emotionally Abused: No     Physically Abused: No     Sexually Abused: No   Housing Stability: Unknown (11/14/2023)    Housing Stability Vital Sign     Unable to Pay for Housing in the Last Year: Not on file     Number of Places Lived in the Last Year: Not on file     Unstable Housing in the Last Year: No       Family History:     Family History   Problem Relation Age of Onset    Heart Attack Mother     Diabetes Mother     Diabetes Father     Heart Attack Father       Medical Decision Making:   I have independently reviewed/ordered the following labs:    CBC with Differential:   Recent Labs     05/27/24  0612 05/27/24  1503 05/28/24  1055   WBC 10.4  --  11.2*   HGB 8.9* 9.7* 8.1*   HCT 28.9* 30.9* 26.1*     --  199   LYMPHOPCT 10*  --  9*   MONOPCT 9*  --  8*   EOSPCT 0  --  1

## 2024-05-28 NOTE — PLAN OF CARE
Problem: Safety - Medical Restraint  Goal: Remains free of injury from restraints (Restraint for Interference with Medical Device)  Description: INTERVENTIONS:  1. Determine that other, less restrictive measures have been tried or would not be effective before applying the restraint  2. Evaluate the patient's condition at the time of restraint application  3. Inform patient/family regarding the reason for restraint  4. Q2H: Monitor safety, psychosocial status, comfort, nutrition and hydration  5/28/2024 1420 by Makeda Sprague RN  Outcome: Progressing  Flowsheets  Taken 5/28/2024 1400 by Makeda Sprague RN  Remains free of injury from restraints (restraint for interference with medical device):   Determine that other, less restrictive measures have been tried or would not be effective before applying the restraint   Inform patient/family regarding the reason for restraint   Evaluate the patient's condition at the time of restraint application   Every 2 hours: Monitor safety, psychosocial status, comfort, nutrition and hydration  Taken 5/28/2024 1200 by Makeda Sprague RN  Remains free of injury from restraints (restraint for interference with medical device):   Determine that other, less restrictive measures have been tried or would not be effective before applying the restraint   Evaluate the patient's condition at the time of restraint application   Inform patient/family regarding the reason for restraint   Every 2 hours: Monitor safety, psychosocial status, comfort, nutrition and hydration  Taken 5/28/2024 1000 by Makeda Sprague RN  Remains free of injury from restraints (restraint for interference with medical device):   Determine that other, less restrictive measures have been tried or would not be effective before applying the restraint   Evaluate the patient's condition at the time of restraint application   Inform patient/family regarding the reason for restraint   Every 2 hours: Monitor safety, psychosocial status,  Progressing  Flowsheets  Taken 5/28/2024 1200  Maintains or returns to baseline bowel function: Assess bowel function  Taken 5/28/2024 0800  Maintains or returns to baseline bowel function: Assess bowel function  Goal: Maintains adequate nutritional intake  5/28/2024 1420 by Makeda Sprague, RN  Outcome: Progressing  Flowsheets  Taken 5/28/2024 1200  Maintains adequate nutritional intake: Monitor percentage of each meal consumed  Taken 5/28/2024 0800  Maintains adequate nutritional intake: Monitor percentage of each meal consumed

## 2024-05-28 NOTE — PROGRESS NOTES
Physical Therapy        Physical Therapy Cancel Note      DATE: 2024    NAME: Kavya De Santiago  MRN: [id number]   : 1960      Patient not seen this date for Physical Therapy due to:    Sedation: Pt is currently sedated and intubated. Will continue to follow and plan for evaluation for later date as pt is medically appropriate.     The above documentation completed by Student Physical Therapist was provided under the direct line of sight by supervision. Documentation was reviewed and accepted by supervising Clinical Instructor Jazlyn Elias PT.       Electronically signed by YAZAN Gonzalez on 2024 at 9:01 AM

## 2024-05-28 NOTE — PROGRESS NOTES
Vancomycin Dosing by Pharmacy - Daily Note   Vancomycin Therapy Day:  3  Indication: sepsis     Allergies:  Codeine   Actual Weight:    Wt Readings from Last 1 Encounters:   05/26/24 90.7 kg (200 lb)       Labs/Ancillary Data  Estimated Creatinine Clearance: 86 mL/min (based on SCr of 0.7 mg/dL).  Recent Labs     05/26/24  0854 05/27/24  0612 05/27/24  0759 05/28/24  1055   CREATININE 1.1*  --  1.1* 0.7   BUN 20  --  16 13   WBC 11.5* 10.4  --  11.2*     Procalcitonin   Date Value Ref Range Status   05/27/2024 11.40 (H) <0.09 ng/mL Final     Comment:           Suspected Sepsis:  <0.50 ng/mL     Low likelihood of sepsis.  0.50-2.00 ng/mL     Increased likelihood of sepsis. Antibiotics encouraged.  >2.00 ng/mL     High risk of sepsis/shock. Antibiotics strongly encouraged.        Suspected Lower Resp Tract Infections:  <0.24 ng/mL     Low likelihood of bacterial infection.  >0.24 ng/mL     Increased likelihood of bacterial infection. Antibiotics encouraged.        With successful antibiotic therapy, PCT levels should decrease rapidly. (Half-life of 24 to   36 hours.)        Procalcitonin values from samples collected within the first 6 hours of systemic infection   may still be low. Retesting may be indicated.  Values from day 1 and day 4 can be entered into the Change in Procalcitonin Calculator   (www.[employer]-pct-calculator.[employer]) to determine the patient's Mortality Risk Prognosis        In healthy neonates, plasma Procalcitonin (PCT) concentrations increase gradually after   birth, reaching peak values at about 24 hours of age then decrease to normal values below   0.5 ng/mL by 48-72 hours of age.         Intake/Output Summary (Last 24 hours) at 5/28/2024 1152  Last data filed at 5/28/2024 0800  Gross per 24 hour   Intake 1766.02 ml   Output 925 ml   Net 841.02 ml     Temp: 100.6    Culture Date / Source  /  Results  See micro   Recent vancomycin administrations                     vancomycin (VANCOCIN) 1,500 mg in

## 2024-05-28 NOTE — PROGRESS NOTES
ICU Progress Note (Vent)   [facility]    Patient - Kavya De Santiago,  Age - 64 y.o.    - 1960      Room Number -    MRN -  [id number]   [id number]  Date of Admission -  2024  8:51 AM    Events of Past 24 Hours   And unresponsive on fentanyl and Versed drip.  Apparently when sedation vacation was done, she had increased respiratory rate but did not follow any commands.  Continues to have persistent fevers    Vitals    weight is 90.7 kg (200 lb). Her temperature is 99.1 °F (37.3 °C). Her blood pressure is 108/63 and her pulse is 91. Her respiration is 20 and oxygen saturation is 97%.       Temperature Range: Temp: 99.1 °F (37.3 °C) Temp  Av.2 °F (38.4 °C)  Min: 97.7 °F (36.5 °C)  Max: 104.2 °F (40.1 °C)  BP Range:  Systolic (24hrs), Av , Min:107 , Max:189     Diastolic (24hrs), Av, Min:48, Max:107    Pulse Range: Pulse  Av.9  Min: 76  Max: 105  Respiration Range: Resp  Av.3  Min: 14  Max: 28  Current Pulse Ox::  SpO2: 97 %  24HR Pulse Ox Range:  SpO2  Av.8 %  Min: 91 %  Max: 100 %  Oxygen Amount and Delivery:        Wt Readings from Last 3 Encounters:   24 90.7 kg (200 lb)   24 91.4 kg (201 lb 9.6 oz)   10/03/23 90.1 kg (198 lb 9.6 oz)     I/O     Intake/Output Summary (Last 24 hours) at 2024 0912  Last data filed at 2024 0800  Gross per 24 hour   Intake 1766.02 ml   Output 925 ml   Net 841.02 ml     I/O last 3 completed shifts:  In: 4834.6 [I.V.:3002; IV Piggyback:1832.7]  Out: 1225 [Urine:1025; Emesis/NG output:200]     DRAIN/TUBE OUTPUT:     Invasive Lines   ETT Day -   3  Lines -  3    ICP PRESSURE RANGE:  No data recorded  CVP PRESSURE RANGE:  No data recorded  Mechanical Ventilation Data   SETTINGS (Comprehensive)  Vent Information  Ventilator Day(s): 1  Ventilator ID: [id number]  Equipment Changed: HME  Vent Mode: AC/PRVC  Additional Respiratory Assessments  Pulse:

## 2024-05-28 NOTE — PROGRESS NOTES
Patient vented and sedated; visit with patient's  who is lovingly attentive to patient at bedside;  anxious and hopeful as he provided medical update; listening presence and support; welcomed prayer     05/28/24 0852   Encounter Summary   Encounter Overview/Reason Spiritual/Emotional Needs   Service Provided For Patient and family together   Referral/Consult From [facility]   Support System Spouse   Last Encounter  05/28/24   Complexity of Encounter Moderate   Spiritual/Emotional needs   Type Spiritual Support   Assessment/Intervention/Outcome   Assessment Anxious;Coping;Hopeful;Powerlessness   Intervention Active listening;Discussed illness injury and it’s impact;Explored/Affirmed feelings, thoughts, concerns;Prayer (assurance of)/[place];Sustaining Presence/Ministry of presence   Outcome Comfort;Coping;Engaged in conversation;Expressed feelings, needs, and concerns;Expressed Gratitude;Receptive

## 2024-05-28 NOTE — PROGRESS NOTES
Resident team called to clarify IVF. Notified that IVF were discontinued at 1500. Resident team reviewing chart and will place orders.

## 2024-05-28 NOTE — PROGRESS NOTES
Osteoarthritis of left knee 08/19/2014    Recurrent major depressive disorder (HCC) 07/15/2022    Restless legs syndrome     Snores 06/06/2022    was seen by Dr. Asencio for likely sleep apnea while hospitalized , to follow up as OP for sleep study, has not been done    TIA (transient ischemic attack) 2005    no residual symptoms    Type II or unspecified type diabetes mellitus without mention of complication, not stated as uncontrolled     Wears glasses     Wellness examination     PCP, Dr. Hamilton, last seen      Acute hypoxic respiratory failure, intubated  Sepsis, BC +ve x2 H. Influenzae.  Diarrhea, imaging demonstrates pancolitis      Plan:        -ID following, currently on Rocephin, IV vancomycin, and oral vancomycin for C.diff empiric treatment  -Stool studies ordered, no BM since admission  -Plan for LP, meningitis workup  -Supportive care  -GI prophylaxis    This plan was formulated in collaboration with Dr. Kincaid    Explained to the patient and d/W Nursing Staff  Will F/U with you  Please call or Page for any issues or change in status  Thanks    This note is created with the assistance of the speech recognition program.  While intending to generate a document that actually reflects the content of the visit, document can still have some errors including those of syntax and sound like substitutions which may escape proof reading.  Actual meaning can be extrapolated by contextual diversion.    Electronically signed by ORLY Houston NP on 5/28/2024 at 2:03 PM

## 2024-05-28 NOTE — PROGRESS NOTES
Dr. Huffman at bedside. MD would like fentanyl and versed gtt to be stopped and have patient started on propofol. MD ok with trickle tube feeds and will place orders.

## 2024-05-28 NOTE — CARE COORDINATION
ONGOING DISCHARGE PLAN:    Patient is intubated.  VENT 30%      Spoke with patient's spouse and daughter at bedside regarding discharge plan and patient confirms that plan is [facility] if needed. Discussed possible LTACH.    +Haemophilus Influenza  5/26 +BC x2   IV rocephin/vanco per ID  Temp 102  IR Lumbar Puncture pending      GI consult-colitis, vomiting, diarrhea  oral vanco-empirically  Hgb 8.1    PT/OT    Will continue to follow for additional discharge needs.    If patient is discharged prior to next notation, then this note serves as note for discharge by case management.    Electronically signed by Daksha Saha RN on 5/28/2024 at 2:58 PM

## 2024-05-28 NOTE — DISCHARGE INSTR - COC
Continuity of Care Form    Patient Name: Kavya De Santiago   :  1960  MRN:  [id number]    Admit date:  2024  Discharge date:  24    Code Status Order: Full Code   Advance Directives:     Admitting Physician:  Venu Lorenzo MD  PCP: Shaina Hamilton MD    Discharging [facility] Unit/[facility]#:   Discharging Unit Phone Number: [phone]    Emergency Contact:   Extended Emergency Contact Information  Primary Emergency Contact: Rj De Santiago  Address: [address] [place] [address] [facility]  Home Phone: [phone]  Work Phone: [phone]  Mobile Phone: [phone]  Relation: Spouse  Secondary Emergency Contact: Johanne De Santiago  Address: [address] [place] [address] [facility]  Home Phone: [phone]  Work Phone: [phone]  Mobile Phone: [phone]  Relation: Child    Past Surgical History:  Past Surgical History:   Procedure Laterality Date    CARPAL TUNNEL RELEASE Left 2022    CUBITAL TUNNEL DECOMPRESSION performed by Candice Harvey DO at [facility] OR     SECTION      x2    COLONOSCOPY  2012    ? polyps per pt    HYSTERECTOMY, TOTAL ABDOMINAL (CERVIX REMOVED)      with BSO    OTHER SURGICAL HISTORY Left 2022    cubital tunnel release    SHOULDER SURGERY Left 2021    SHOULDER MANIPULATION WITH PRE OP INTERSCALENE BLOCK and intraop injection performed by Ahsan Brown DO at [facility] OR    SPINE SURGERY Left 10/03/2022    S1 nerve root injection, Dr. Alston    SPINE SURGERY Left 10/06/2022    S1 NERVE ROOT INJECTION performed by Rema Alston MD at [facility] OR    TONSILLECTOMY      as child    UPPER GASTROINTESTINAL ENDOSCOPY  2012    [facility]       Immunization History:   Immunization History   Administered Date(s) Administered    COVID-19, MODERNA BLUE border, Primary or Immunocompromised, (age 12y+), IM, 100 mcg/0.5mL 2021, 2021    DTaP

## 2024-05-28 NOTE — PROGRESS NOTES
Resident team notified of need for leigh catheter order if they would like leigh to remain in place. Resident team placing orders.

## 2024-05-28 NOTE — PROGRESS NOTES
[facility]   OCCUPATIONAL THERAPY MISSED TREATMENT NOTE   INPATIENT   Date: 24  Patient Name: Kavya De Santiago       Room:   MRN: [id number]   Account #: [id number]    : 1960  (64 y.o.)  Gender: female           REASON FOR MISSED TREATMENT:  Pt intubated/sedated; OT to continue to follow for evaluation when appropriate          Electronically signed by Alexandra Cui OT on 24 at 9:00 AM EDT

## 2024-05-28 NOTE — PROGRESS NOTES
Patient breathing 38-39 on the vent, heart rate increased to 120-130's and patient extremely restless. Sedation restarted.

## 2024-05-28 NOTE — PLAN OF CARE
Problem: Safety - Medical Restraint  Goal: Remains free of injury from restraints (Restraint for Interference with Medical Device)  Description: INTERVENTIONS:  1. Determine that other, less restrictive measures have been tried or would not be effective before applying the restraint  2. Evaluate the patient's condition at the time of restraint application  3. Inform patient/family regarding the reason for restraint  4. Q2H: Monitor safety, psychosocial status, comfort, nutrition and hydration  Flowsheets (Taken 5/28/2024 0431)  Remains free of injury from restraints (restraint for interference with medical device):   Evaluate the patient's condition at the time of restraint application   Determine that other, less restrictive measures have been tried or would not be effective before applying the restraint   Every 2 hours: Monitor safety, psychosocial status, comfort, nutrition and hydration  Note: Bilat soft wrist restraints continue to prevent accidental dislodging of ET     Problem: Safety - Adult  Goal: Free from fall injury  Outcome: Progressing     Problem: Discharge Planning  Goal: Discharge to home or other facility with appropriate resources  Outcome: Not Progressing     Problem: Pain  Goal: Verbalizes/displays adequate comfort level or baseline comfort level  Outcome: Adequate for Discharge     Problem: Skin/Tissue Integrity  Goal: Absence of new skin breakdown  Description: 1.  Monitor for areas of redness and/or skin breakdown  2.  Assess vascular access sites hourly  3.  Every 4-6 hours minimum:  Change oxygen saturation probe site  4.  Every 4-6 hours:  If on nasal continuous positive airway pressure, respiratory therapy assess nares and determine need for appliance change or resting period.  Outcome: Progressing  Note: No new skin breakdown noted     Problem: ABCDS Injury Assessment  Goal: Absence of physical injury  Outcome: Progressing     Problem: Cardiovascular - Adult  Goal: Maintains optimal

## 2024-05-29 ENCOUNTER — APPOINTMENT (OUTPATIENT)
Dept: INTERVENTIONAL RADIOLOGY/VASCULAR | Age: 64
End: 2024-05-29
Attending: INTERNAL MEDICINE
Payer: COMMERCIAL

## 2024-05-29 ENCOUNTER — APPOINTMENT (OUTPATIENT)
Dept: GENERAL RADIOLOGY | Age: 64
End: 2024-05-29
Payer: COMMERCIAL

## 2024-05-29 LAB
ALBUMIN SERPL-MCNC: 2.8 G/DL (ref 3.5–5.2)
ALP SERPL-CCNC: 119 U/L (ref 35–104)
ALT SERPL-CCNC: 46 U/L (ref 5–33)
ANION GAP SERPL CALCULATED.3IONS-SCNC: 14 MMOL/L (ref 9–17)
APPEARANCE CSF: ABNORMAL
ARTERIAL PATENCY WRIST A: ABNORMAL
AST SERPL-CCNC: 72 U/L
BANDS, CSF: NORMAL %
BASOPHILS # BLD: 0 K/UL (ref 0–0.2)
BASOPHILS NFR BLD: 0 % (ref 0–2)
BASOPHILS NFR CSF: NORMAL %
BDY SITE: ABNORMAL
BILIRUB DIRECT SERPL-MCNC: 0.1 MG/DL
BILIRUB INDIRECT SERPL-MCNC: 0.1 MG/DL (ref 0–1)
BILIRUB SERPL-MCNC: 0.2 MG/DL (ref 0.3–1.2)
BLASTS NFR CSF: NORMAL %
BUN SERPL-MCNC: 10 MG/DL (ref 8–23)
C GATTII+NEOFOR DNA CSF QL NAA+NON-PROBE: NOT DETECTED
CALCIUM SERPL-MCNC: 7.1 MG/DL (ref 8.6–10.4)
CHLORIDE SERPL-SCNC: 110 MMOL/L (ref 98–107)
CMV DNA CSF QL NAA+NON-PROBE: NOT DETECTED
CO2 SERPL-SCNC: 18 MMOL/L (ref 20–31)
COHGB MFR BLD: 1.1 % (ref 0–5)
COLOR CSF: ABNORMAL
CREAT SERPL-MCNC: 0.6 MG/DL (ref 0.5–0.9)
E COLI K1 DNA CSF QL NAA+NON-PROBE: NOT DETECTED
EOSINOPHIL # BLD: 0.4 K/UL (ref 0–0.4)
EOSINOPHIL NFR CSF: NORMAL %
EOSINOPHILS RELATIVE PERCENT: 4 % (ref 0–4)
ERYTHROCYTE [DISTWIDTH] IN BLOOD BY AUTOMATED COUNT: 13.8 % (ref 11.5–14.9)
EV RNA CSF QL NAA+NON-PROBE: NOT DETECTED
FIO2 ON VENT: 30 %
GAS FLOW.O2 O2 DELIVERY SYS: ABNORMAL L/MIN
GAS FLOW.O2 SETTING OXYMISER: 20 L/MIN
GFR, ESTIMATED: >90 ML/MIN/1.73M2
GLUCOSE BLD-MCNC: 174 MG/DL (ref 65–105)
GLUCOSE BLD-MCNC: 175 MG/DL (ref 65–105)
GLUCOSE BLD-MCNC: 183 MG/DL (ref 65–105)
GLUCOSE BLD-MCNC: 194 MG/DL (ref 65–105)
GLUCOSE BLD-MCNC: 221 MG/DL (ref 65–105)
GLUCOSE CSF-MCNC: 68 MG/DL (ref 40–70)
GLUCOSE SERPL-MCNC: 208 MG/DL (ref 70–99)
GP B STREP DNA CSF QL NAA+NON-PROBE: NOT DETECTED
HAEM INFLU DNA CSF QL NAA+NON-PROBE: DETECTED
HCO3 ARTERIAL: 20.1 MMOL/L (ref 22–26)
HCT VFR BLD AUTO: 24.5 % (ref 36–46)
HGB BLD-MCNC: 7.9 G/DL (ref 12–16)
HHV6 DNA CSF QL NAA+NON-PROBE: NOT DETECTED
HSV1 DNA CSF QL NAA+NON-PROBE: NOT DETECTED
HSV2 DNA CSF QL NAA+NON-PROBE: NOT DETECTED
L MONOCYTOG DNA CSF QL NAA+NON-PROBE: NOT DETECTED
LYMPHOCYTES NFR BLD: 1.11 K/UL (ref 1–4.8)
LYMPHOCYTES NFR CSF: 11 %
LYMPHOCYTES RELATIVE PERCENT: 11 % (ref 24–44)
MAGNESIUM SERPL-MCNC: 1.8 MG/DL (ref 1.6–2.6)
MCH RBC QN AUTO: 27.7 PG (ref 26–34)
MCHC RBC AUTO-ENTMCNC: 32.2 G/DL (ref 31–37)
MCV RBC AUTO: 85.9 FL (ref 80–100)
METAYELO CSF: NORMAL %
METHEMOGLOBIN: 0.3 % (ref 0–1.9)
MICROORGANISM SPEC CULT: ABNORMAL
MONOCYTES NFR BLD: 0.71 K/UL (ref 0.1–1.3)
MONOCYTES NFR BLD: 7 % (ref 1–7)
MONOCYTES, CSF: 3 %
MORPHOLOGY: NORMAL
MYELOCYTE CSF: NORMAL %
N MEN DNA CSF QL NAA+NON-PROBE: NOT DETECTED
NEGATIVE BASE EXCESS, ART: 3.7 MMOL/L (ref 0–2)
NEUTROPHILS NFR BLD: 78 % (ref 36–66)
NEUTROPHILS NFR CSF: 86 %
NEUTS SEG NFR BLD: 7.88 K/UL (ref 1.3–9.1)
NUC CELL # FLD MANUAL: 280 CELLS/UL
O2 SAT, ARTERIAL: 91.4 % (ref 95–98)
PARECHOVIRUS A RNA CSF QL NAA+NON-PROBE: NOT DETECTED
PCO2 ARTERIAL: 31.3 MMHG (ref 35–45)
PEEP RESPIRATORY: 7 CM[H2O]
PH ARTERIAL: 7.42 (ref 7.35–7.45)
PLATELET # BLD AUTO: 202 K/UL (ref 150–450)
PMV BLD AUTO: 8.1 FL (ref 6–12)
PO2 ARTERIAL: 64.9 MMHG (ref 80–100)
POTASSIUM SERPL-SCNC: 3.4 MMOL/L (ref 3.7–5.3)
PROT CSF-MCNC: 253.7 MG/DL (ref 15–45)
PROT SERPL-MCNC: 5.3 G/DL (ref 6.4–8.3)
PT. POSITION: ABNORMAL
RBC # BLD AUTO: 2.85 M/UL (ref 4–5.2)
RBC # FLD MANUAL: 140 CELLS/UL
RESPIRATORY RATE: 20
S PNEUM DNA CSF QL NAA+NON-PROBE: NOT DETECTED
SERVICE CMNT-IMP: ABNORMAL
SODIUM SERPL-SCNC: 142 MMOL/L (ref 135–144)
SPECIMEN DESCRIPTION: ABNORMAL
SPECIMEN DESCRIPTION: ABNORMAL
SPECIMEN VOL CSF: 8 ML
TEXT FOR RESPIRATORY: ABNORMAL
TOTAL RATE: 31
TRIGL SERPL-MCNC: 353 MG/DL
TUBE # CSF: 3
UNIDENT CELLS NFR FLD: NORMAL %
VENTILATION MODE VENT: ABNORMAL
VT: 500
VZV DNA CSF QL NAA+NON-PROBE: NOT DETECTED
WBC OTHER # BLD: 10.1 K/UL (ref 3.5–11)
XANTHOCHROMIA CSF QL: PRESENT

## 2024-05-29 PROCEDURE — 87483 CNS DNA AMP PROBE TYPE 12-25: CPT

## 2024-05-29 PROCEDURE — 84157 ASSAY OF PROTEIN OTHER: CPT

## 2024-05-29 PROCEDURE — 36600 WITHDRAWAL OF ARTERIAL BLOOD: CPT

## 2024-05-29 PROCEDURE — 6370000000 HC RX 637 (ALT 250 FOR IP): Performed by: INTERNAL MEDICINE

## 2024-05-29 PROCEDURE — 2700000000 HC OXYGEN THERAPY PER DAY

## 2024-05-29 PROCEDURE — 84478 ASSAY OF TRIGLYCERIDES: CPT

## 2024-05-29 PROCEDURE — 87205 SMEAR GRAM STAIN: CPT

## 2024-05-29 PROCEDURE — 6360000002 HC RX W HCPCS: Performed by: INTERNAL MEDICINE

## 2024-05-29 PROCEDURE — 99233 SBSQ HOSP IP/OBS HIGH 50: CPT | Performed by: INTERNAL MEDICINE

## 2024-05-29 PROCEDURE — 6370000000 HC RX 637 (ALT 250 FOR IP)

## 2024-05-29 PROCEDURE — 2709999900 IR LUMBAR PUNCTURE FOR DIAGNOSIS

## 2024-05-29 PROCEDURE — 2580000003 HC RX 258: Performed by: INTERNAL MEDICINE

## 2024-05-29 PROCEDURE — 6360000002 HC RX W HCPCS

## 2024-05-29 PROCEDURE — 89051 BODY FLUID CELL COUNT: CPT

## 2024-05-29 PROCEDURE — 83735 ASSAY OF MAGNESIUM: CPT

## 2024-05-29 PROCEDURE — 009U3ZX DRAINAGE OF SPINAL CANAL, PERCUTANEOUS APPROACH, DIAGNOSTIC: ICD-10-PCS | Performed by: RADIOLOGY

## 2024-05-29 PROCEDURE — 2580000003 HC RX 258

## 2024-05-29 PROCEDURE — 82805 BLOOD GASES W/O2 SATURATION: CPT

## 2024-05-29 PROCEDURE — 2000000000 HC ICU R&B

## 2024-05-29 PROCEDURE — 94761 N-INVAS EAR/PLS OXIMETRY MLT: CPT

## 2024-05-29 PROCEDURE — 71045 X-RAY EXAM CHEST 1 VIEW: CPT

## 2024-05-29 PROCEDURE — B01B1ZZ FLUOROSCOPY OF SPINAL CORD USING LOW OSMOLAR CONTRAST: ICD-10-PCS | Performed by: RADIOLOGY

## 2024-05-29 PROCEDURE — 2500000003 HC RX 250 WO HCPCS: Performed by: NURSE PRACTITIONER

## 2024-05-29 PROCEDURE — 87015 SPECIMEN INFECT AGNT CONCNTJ: CPT

## 2024-05-29 PROCEDURE — 85025 COMPLETE CBC W/AUTO DIFF WBC: CPT

## 2024-05-29 PROCEDURE — 87506 IADNA-DNA/RNA PROBE TQ 6-11: CPT

## 2024-05-29 PROCEDURE — APPSS30 APP SPLIT SHARED TIME 16-30 MINUTES: Performed by: NURSE PRACTITIONER

## 2024-05-29 PROCEDURE — 36415 COLL VENOUS BLD VENIPUNCTURE: CPT

## 2024-05-29 PROCEDURE — 99232 SBSQ HOSP IP/OBS MODERATE 35: CPT | Performed by: INTERNAL MEDICINE

## 2024-05-29 PROCEDURE — 80048 BASIC METABOLIC PNL TOTAL CA: CPT

## 2024-05-29 PROCEDURE — 62328 DX LMBR SPI PNXR W/FLUOR/CT: CPT

## 2024-05-29 PROCEDURE — 82945 GLUCOSE OTHER FLUID: CPT

## 2024-05-29 PROCEDURE — 94003 VENT MGMT INPAT SUBQ DAY: CPT

## 2024-05-29 PROCEDURE — 80076 HEPATIC FUNCTION PANEL: CPT

## 2024-05-29 PROCEDURE — 82947 ASSAY GLUCOSE BLOOD QUANT: CPT

## 2024-05-29 PROCEDURE — 87070 CULTURE OTHR SPECIMN AEROBIC: CPT

## 2024-05-29 RX ORDER — VECURONIUM BROMIDE 1 MG/ML
10 INJECTION, POWDER, LYOPHILIZED, FOR SOLUTION INTRAVENOUS
Status: DISCONTINUED | OUTPATIENT
Start: 2024-05-29 | End: 2024-05-30

## 2024-05-29 RX ORDER — VANCOMYCIN HYDROCHLORIDE 50 MG/ML
125 KIT ORAL EVERY 6 HOURS SCHEDULED
Status: DISCONTINUED | OUTPATIENT
Start: 2024-05-29 | End: 2024-06-05

## 2024-05-29 RX ORDER — LISINOPRIL 20 MG/1
40 TABLET ORAL DAILY
Status: DISCONTINUED | OUTPATIENT
Start: 2024-05-29 | End: 2024-05-29

## 2024-05-29 RX ORDER — ROPINIROLE 1 MG/1
2 TABLET, FILM COATED ORAL 2 TIMES DAILY
Status: DISCONTINUED | OUTPATIENT
Start: 2024-05-29 | End: 2024-06-14 | Stop reason: HOSPADM

## 2024-05-29 RX ORDER — METOPROLOL TARTRATE 50 MG/1
50 TABLET, FILM COATED ORAL 2 TIMES DAILY
Status: DISCONTINUED | OUTPATIENT
Start: 2024-05-29 | End: 2024-05-30

## 2024-05-29 RX ORDER — FUROSEMIDE 20 MG/1
20 TABLET ORAL DAILY
Status: DISCONTINUED | OUTPATIENT
Start: 2024-05-29 | End: 2024-05-29

## 2024-05-29 RX ORDER — ROPINIROLE 2 MG/1
2 TABLET, FILM COATED ORAL NIGHTLY
Status: DISCONTINUED | OUTPATIENT
Start: 2024-05-29 | End: 2024-05-29

## 2024-05-29 RX ORDER — LISINOPRIL 20 MG/1
20 TABLET ORAL DAILY
Status: DISCONTINUED | OUTPATIENT
Start: 2024-05-29 | End: 2024-05-31

## 2024-05-29 RX ORDER — HYDRALAZINE HYDROCHLORIDE 20 MG/ML
10 INJECTION INTRAMUSCULAR; INTRAVENOUS EVERY 6 HOURS PRN
Status: DISCONTINUED | OUTPATIENT
Start: 2024-05-29 | End: 2024-05-29

## 2024-05-29 RX ORDER — INSULIN GLARGINE 100 [IU]/ML
5 INJECTION, SOLUTION SUBCUTANEOUS ONCE
Status: COMPLETED | OUTPATIENT
Start: 2024-05-29 | End: 2024-05-29

## 2024-05-29 RX ORDER — ENOXAPARIN SODIUM 100 MG/ML
30 INJECTION SUBCUTANEOUS 2 TIMES DAILY
Status: DISCONTINUED | OUTPATIENT
Start: 2024-05-29 | End: 2024-06-14 | Stop reason: HOSPADM

## 2024-05-29 RX ORDER — INSULIN GLARGINE 100 [IU]/ML
10 INJECTION, SOLUTION SUBCUTANEOUS DAILY
Status: DISCONTINUED | OUTPATIENT
Start: 2024-05-30 | End: 2024-05-30

## 2024-05-29 RX ADMIN — FENTANYL CITRATE 50 MCG: 0.05 INJECTION, SOLUTION INTRAMUSCULAR; INTRAVENOUS at 04:27

## 2024-05-29 RX ADMIN — BUSPIRONE HYDROCHLORIDE 10 MG: 10 TABLET ORAL at 20:35

## 2024-05-29 RX ADMIN — PROPOFOL 50 MCG/KG/MIN: 10 INJECTION, EMULSION INTRAVENOUS at 15:47

## 2024-05-29 RX ADMIN — ACETAMINOPHEN 650 MG: 325 TABLET ORAL at 20:34

## 2024-05-29 RX ADMIN — SODIUM CHLORIDE, PRESERVATIVE FREE 10 ML: 5 INJECTION INTRAVENOUS at 21:57

## 2024-05-29 RX ADMIN — ROPINIROLE HYDROCHLORIDE 2 MG: 2 TABLET, FILM COATED ORAL at 09:57

## 2024-05-29 RX ADMIN — ANTI-FUNGAL POWDER MICONAZOLE NITRATE TALC FREE: 1.42 POWDER TOPICAL at 20:34

## 2024-05-29 RX ADMIN — SODIUM CHLORIDE 5 MG/HR: 9 INJECTION, SOLUTION INTRAVENOUS at 21:56

## 2024-05-29 RX ADMIN — LABETALOL HYDROCHLORIDE 10 MG: 5 INJECTION, SOLUTION INTRAVENOUS at 14:50

## 2024-05-29 RX ADMIN — LABETALOL HYDROCHLORIDE 10 MG: 5 INJECTION, SOLUTION INTRAVENOUS at 06:39

## 2024-05-29 RX ADMIN — INSULIN GLARGINE 5 UNITS: 100 INJECTION, SOLUTION SUBCUTANEOUS at 07:51

## 2024-05-29 RX ADMIN — ROPINIROLE HYDROCHLORIDE 2 MG: 2 TABLET, FILM COATED ORAL at 20:35

## 2024-05-29 RX ADMIN — PROPOFOL 50 MCG/KG/MIN: 10 INJECTION, EMULSION INTRAVENOUS at 12:37

## 2024-05-29 RX ADMIN — SODIUM CHLORIDE: 9 INJECTION, SOLUTION INTRAVENOUS at 12:39

## 2024-05-29 RX ADMIN — ENOXAPARIN SODIUM 30 MG: 100 INJECTION SUBCUTANEOUS at 20:35

## 2024-05-29 RX ADMIN — FUROSEMIDE 20 MG: 20 TABLET ORAL at 08:59

## 2024-05-29 RX ADMIN — SODIUM CHLORIDE, PRESERVATIVE FREE 10 ML: 5 INJECTION INTRAVENOUS at 00:16

## 2024-05-29 RX ADMIN — FENTANYL CITRATE 50 MCG: 0.05 INJECTION, SOLUTION INTRAMUSCULAR; INTRAVENOUS at 12:15

## 2024-05-29 RX ADMIN — FENTANYL CITRATE 50 MCG: 0.05 INJECTION, SOLUTION INTRAMUSCULAR; INTRAVENOUS at 09:04

## 2024-05-29 RX ADMIN — POTASSIUM BICARBONATE 40 MEQ: 782 TABLET, EFFERVESCENT ORAL at 06:06

## 2024-05-29 RX ADMIN — FENTANYL CITRATE 50 MCG: 0.05 INJECTION, SOLUTION INTRAMUSCULAR; INTRAVENOUS at 02:12

## 2024-05-29 RX ADMIN — CEFTRIAXONE SODIUM 2000 MG: 2 INJECTION, POWDER, FOR SOLUTION INTRAMUSCULAR; INTRAVENOUS at 05:22

## 2024-05-29 RX ADMIN — PROPOFOL 50 MCG/KG/MIN: 10 INJECTION, EMULSION INTRAVENOUS at 23:47

## 2024-05-29 RX ADMIN — PANTOPRAZOLE SODIUM 40 MG: 40 TABLET, DELAYED RELEASE ORAL at 06:06

## 2024-05-29 RX ADMIN — VANCOMYCIN HYDROCHLORIDE 750 MG: 750 INJECTION, POWDER, LYOPHILIZED, FOR SOLUTION INTRAVENOUS at 09:08

## 2024-05-29 RX ADMIN — ANTI-FUNGAL POWDER MICONAZOLE NITRATE TALC FREE: 1.42 POWDER TOPICAL at 07:51

## 2024-05-29 RX ADMIN — BUSPIRONE HYDROCHLORIDE 10 MG: 10 TABLET ORAL at 14:32

## 2024-05-29 RX ADMIN — LABETALOL HYDROCHLORIDE 10 MG: 5 INJECTION, SOLUTION INTRAVENOUS at 02:43

## 2024-05-29 RX ADMIN — FENTANYL CITRATE 50 MCG: 0.05 INJECTION, SOLUTION INTRAMUSCULAR; INTRAVENOUS at 07:33

## 2024-05-29 RX ADMIN — LISINOPRIL 20 MG: 20 TABLET ORAL at 09:04

## 2024-05-29 RX ADMIN — SODIUM CHLORIDE, PRESERVATIVE FREE 10 ML: 5 INJECTION INTRAVENOUS at 07:51

## 2024-05-29 RX ADMIN — SODIUM CHLORIDE 5 MG/HR: 9 INJECTION, SOLUTION INTRAVENOUS at 18:02

## 2024-05-29 RX ADMIN — INSULIN LISPRO 2 UNITS: 100 INJECTION, SOLUTION INTRAVENOUS; SUBCUTANEOUS at 23:18

## 2024-05-29 RX ADMIN — VANCOMYCIN HYDROCHLORIDE 750 MG: 750 INJECTION, POWDER, LYOPHILIZED, FOR SOLUTION INTRAVENOUS at 20:10

## 2024-05-29 RX ADMIN — LATANOPROST 1 DROP: 50 SOLUTION OPHTHALMIC at 20:35

## 2024-05-29 RX ADMIN — ANTI-FUNGAL POWDER MICONAZOLE NITRATE TALC FREE: 1.42 POWDER TOPICAL at 03:05

## 2024-05-29 RX ADMIN — ATORVASTATIN CALCIUM 40 MG: 40 TABLET, FILM COATED ORAL at 20:35

## 2024-05-29 RX ADMIN — VANCOMYCIN HYDROCHLORIDE 125 MG: KIT at 14:32

## 2024-05-29 RX ADMIN — VANCOMYCIN HYDROCHLORIDE 125 MG: KIT at 08:59

## 2024-05-29 RX ADMIN — PROPOFOL 50 MCG/KG/MIN: 10 INJECTION, EMULSION INTRAVENOUS at 04:50

## 2024-05-29 RX ADMIN — ACETAMINOPHEN 650 MG: 325 TABLET ORAL at 07:50

## 2024-05-29 RX ADMIN — PROPOFOL 45 MCG/KG/MIN: 10 INJECTION, EMULSION INTRAVENOUS at 00:16

## 2024-05-29 RX ADMIN — VANCOMYCIN HYDROCHLORIDE 125 MG: KIT at 21:00

## 2024-05-29 RX ADMIN — PROPOFOL 50 MCG/KG/MIN: 10 INJECTION, EMULSION INTRAVENOUS at 19:41

## 2024-05-29 RX ADMIN — SODIUM CHLORIDE: 9 INJECTION, SOLUTION INTRAVENOUS at 02:12

## 2024-05-29 RX ADMIN — INSULIN GLARGINE 5 UNITS: 100 INJECTION, SOLUTION SUBCUTANEOUS at 09:57

## 2024-05-29 RX ADMIN — CEFTRIAXONE SODIUM 2000 MG: 2 INJECTION, POWDER, FOR SOLUTION INTRAMUSCULAR; INTRAVENOUS at 15:48

## 2024-05-29 RX ADMIN — BUSPIRONE HYDROCHLORIDE 10 MG: 10 TABLET ORAL at 07:50

## 2024-05-29 RX ADMIN — INSULIN LISPRO 2 UNITS: 100 INJECTION, SOLUTION INTRAVENOUS; SUBCUTANEOUS at 06:06

## 2024-05-29 RX ADMIN — METOPROLOL TARTRATE 50 MG: 50 TABLET, FILM COATED ORAL at 16:30

## 2024-05-29 ASSESSMENT — PULMONARY FUNCTION TESTS
PIF_VALUE: 23
PIF_VALUE: 20
PIF_VALUE: 16
PIF_VALUE: 20
PIF_VALUE: 25
PIF_VALUE: 24
PIF_VALUE: 24
PIF_VALUE: 17
PIF_VALUE: 21
PIF_VALUE: 27
PIF_VALUE: 24
PIF_VALUE: 25
PIF_VALUE: 15
PIF_VALUE: 23
PIF_VALUE: 17
PIF_VALUE: 25
PIF_VALUE: 23
PIF_VALUE: 27
PIF_VALUE: 21
PIF_VALUE: 24
PIF_VALUE: 21
PIF_VALUE: 25
PIF_VALUE: 24
PIF_VALUE: 27
PIF_VALUE: 24
PIF_VALUE: 30
PIF_VALUE: 11
PIF_VALUE: 23
PIF_VALUE: 25
PIF_VALUE: 25
PIF_VALUE: 22
PIF_VALUE: 22
PIF_VALUE: 21
PIF_VALUE: 27
PIF_VALUE: 24
PIF_VALUE: 22
PIF_VALUE: 25
PIF_VALUE: 24
PIF_VALUE: 21
PIF_VALUE: 19
PIF_VALUE: 21
PIF_VALUE: 22
PIF_VALUE: 22
PIF_VALUE: 25
PIF_VALUE: 19
PIF_VALUE: 24
PIF_VALUE: 27
PIF_VALUE: 22
PIF_VALUE: 22

## 2024-05-29 NOTE — PROGRESS NOTES
05/29/24 1754   Encounter Summary   Encounter Overview/Reason Spiritual/Emotional Needs   Service Provided For Patient   Referral/Consult From Rounding   Complexity of Encounter Low   Spiritual/Emotional needs   Type Spiritual Support   Assessment/Intervention/Outcome   Assessment Unable to assess   Intervention Prayer (assurance of)/[place]

## 2024-05-29 NOTE — PROGRESS NOTES
Writer asked Dr. Huffman if he would still like CVP to be checked. MD does not want CVP to be checked. Writer inquired about TLC removal-MD would like TLC to remain in place.

## 2024-05-29 NOTE — PROGRESS NOTES
Vancomycin Dosing by Pharmacy - Daily Note   Vancomycin Therapy Day:  4  Indication: sepsis, CNS r/o    Allergies:  Codeine   Actual Weight:    Wt Readings from Last 1 Encounters:   05/29/24 105 kg (231 lb 7.7 oz)       Labs/Ancillary Data  Estimated Creatinine Clearance: 109 mL/min (based on SCr of 0.6 mg/dL).  Recent Labs     05/27/24  0612 05/27/24  0759 05/28/24  1055 05/29/24  0446 05/29/24  0622   CREATININE  --  1.1* 0.7 0.6  --    BUN  --  16 13 10  --    WBC 10.4  --  11.2*  --  10.1     Procalcitonin   Date Value Ref Range Status   05/27/2024 11.40 (H) <0.09 ng/mL Final     Comment:           Suspected Sepsis:  <0.50 ng/mL     Low likelihood of sepsis.  0.50-2.00 ng/mL     Increased likelihood of sepsis. Antibiotics encouraged.  >2.00 ng/mL     High risk of sepsis/shock. Antibiotics strongly encouraged.        Suspected Lower Resp Tract Infections:  <0.24 ng/mL     Low likelihood of bacterial infection.  >0.24 ng/mL     Increased likelihood of bacterial infection. Antibiotics encouraged.        With successful antibiotic therapy, PCT levels should decrease rapidly. (Half-life of 24 to   36 hours.)        Procalcitonin values from samples collected within the first 6 hours of systemic infection   may still be low. Retesting may be indicated.  Values from day 1 and day 4 can be entered into the Change in Procalcitonin Calculator   ([email or web address]pct-calculator.[employer]) to determine the patient's Mortality Risk Prognosis        In healthy neonates, plasma Procalcitonin (PCT) concentrations increase gradually after   birth, reaching peak values at about 24 hours of age then decrease to normal values below   0.5 ng/mL by 48-72 hours of age.         Intake/Output Summary (Last 24 hours) at 5/29/2024 0746  Last data filed at 5/29/2024 0400  Gross per 24 hour   Intake 3198.31 ml   Output 1750 ml   Net 1448.31 ml     Temp: 100.2    Culture Date / Source  /  Results  See micro  Recent vancomycin administrations

## 2024-05-29 NOTE — PROGRESS NOTES
ICU Progress Note (Vent)   [facility]    Patient - Kavya De Santiago,  Age - 64 y.o.    - 1960      Room Number -    MRN -  [id number]   [facility] # - [id number]  Date of Admission -  2024  8:51 AM    Events of Past 24 Hours     On sedation vacation patient very restless not following commands      Vitals    height is 1.575 m (5' 2\") and weight is 105 kg (231 lb 7.7 oz). Her temperature is 100.2 °F (37.9 °C). Her blood pressure is 185/75 (abnormal) and her pulse is 84. Her respiration is 22 and oxygen saturation is 94%.       Temperature Range: Temp: 100.2 °F (37.9 °C) Temp  Av.1 °F (37.8 °C)  Min: 99.1 °F (37.3 °C)  Max: 102 °F (38.9 °C)  BP Range:  Systolic (24hrs), Av , Min:108 , Max:194     Diastolic (24hrs), Av, Min:51, Max:99    Pulse Range: Pulse  Av.7  Min: 79  Max: 112  Respiration Range: Resp  Av.2  Min: 16  Max: 26  Current Pulse Ox::  SpO2: 94 %  24HR Pulse Ox Range:  SpO2  Av %  Min: 93 %  Max: 99 %  Oxygen Amount and Delivery:        Wt Readings from Last 3 Encounters:   24 105 kg (231 lb 7.7 oz)   24 91.4 kg (201 lb 9.6 oz)   10/03/23 90.1 kg (198 lb 9.6 oz)     I/O     Intake/Output Summary (Last 24 hours) at 2024 0855  Last data filed at 2024 0751  Gross per 24 hour   Intake 3148.31 ml   Output 1750 ml   Net 1398.31 ml       I/O last 3 completed shifts:  In: 4904.3 [I.V.:3808.4; NG/GT:553; IV Piggyback:542.9]  Out: 2300 [Urine:2100; Emesis/NG output:200]     DRAIN/TUBE OUTPUT:     Invasive Lines   ETT Day -   4  Lines -right internal jugular central line 4    ICP PRESSURE RANGE:  No data recorded  CVP PRESSURE RANGE:  No data recorded  Mechanical Ventilation Data   SETTINGS (Comprehensive)  Vent Information  Ventilator Day(s): 1  Ventilator ID: SERVO [id number]  Equipment Changed: HME  Vent Mode: AC/PRVC  Additional Respiratory Assessments  Pulse: 84  Respirations: 22  SpO2: 94

## 2024-05-29 NOTE — PROGRESS NOTES
[facility]  IN-PATIENT SERVICE  [facility]    PROGRESS NOTE             5/29/2024    8:17 AM    Name:   Kavya De Santiago  MRN:     [id number]     Acct:      [id number]   Room:   2003/2003-01  IP Day:  3  Admit Date:  5/26/2024  8:51 AM    PCP:  Shaina Hamilton MD  Code Status:  Full Code    SUBJECTIVE     Status: unchanged    Interval History:    Patient seen and examined at bedside this morning. Remains sedated on propofol, intubated, has been off Levophed since 5/27.  Continues to have fevers overnight, up to 100.9.  Blood culture showing H. influenzae bacteremia, continue Rocephin, vancomycin.  Plan for LP today, workup for meningitis.  Some episodes of hypertension to 180 systolic, will plan to reconcile home medications.    Brief History:    64-year-old female with history of T2DM, HTN, HLD, CKD, GERD, DIEGO, obesity who was brought to the ED for AMS, nausea, vomiting, diarrhea.  Patient is intubated at the time of my visit, her  Rj is present to answer questions.  Reportedly patient had some upper respiratory symptoms over the past week or so, with a dry cough, decreased appetite, rhinorrhea, congestion, headache/sinus pain.  This morning at 4 AM patient began to have diarrhea,  is unsure if it was bloody or not, and she also had several episodes of vomiting.  When he checked on her he found her on the ground, EMS was called, they arrived she has a GCS was 8-9, O2 saturations dropped on the way to the ED, given etomidate and Versed and placed a supraglottic airway, on arrival to the ED she was intubated.   CT head showed no acute abnormality, CTA chest showed no evidence of PE, CT abdomen pelvis showed pancolitis with abnormal wall thickening of the ascending, transverse, descending colon, sigmoid colon and rectum.  Lactate elevated 3.3.  Magnesium 0.9, replaced.  Leukocytosis with WBC 11.5.  Started on piperacillin/tazobactam.  Chest x-ray

## 2024-05-29 NOTE — BRIEF OP NOTE
Brief Postoperative Note    Kavya De Santiago  YOB: 1960  [id number]    Pre-operative Diagnosis: AMS    Post-operative Diagnosis: Same    Procedure: Fluoro guided LP    Anesthesia: Local    Surgeons/Assistants: Mario    Estimated Blood Loss: less than 50     Complications: None    Specimens: Was Obtained: 9 ml of cloudy CSF    Electronically signed by Blas Martin MD on 5/29/2024 at 12:36 PM

## 2024-05-29 NOTE — PROGRESS NOTES
Dr. Huffman notified of continued hypertension despite PRN labetalol being given. MD placing orders for scheduled metoprolol with parameters.

## 2024-05-29 NOTE — PROGRESS NOTES
[facility] -   Infectious diseases evaluation  admission date 5/26/2024    reason for consultation:   High fever, unknown etiology sepsis    Impression :   Current:  Haemophilus influenza bacteremia, likely upper respiratory source, rule out meningitis.  Sepsis secondary to above  Colitis  Diabetes mellitus  Chronic kidney disease  Hyperlipidemia  Hypertension  GERD  [place]      HENCE:     IV ceftriaxone 2 g every 12  Spinal tap l planned for later today  Discontinue IV  vancomycin   Stool for C. difficile and GI panel pending   oral vancomycin.  Nasal swab for MRSA negative  Respiratory culture grew normal elli  Follow CBC and renal function  Continue supportive care      Infection Control Recommendations   [place] Precautions  Droplet Isolation      Antimicrobial Stewardship Recommendations   Simplification of therapy  Targeted therapy      History of Present Illness:   Initial history:  Kavya De Santiago is a 64 y.o.-year-old female was brought to the hospital for altered mental status associated with nausea, vomiting and diarrhea.  The patient is intubated, unable to provide history that was obtained from chart review and  at the bedside had upper respiratory symptoms, congestion, headache and rhinorrhea for 1 week associated with dry cough and decreased appetite.  The patient was found on the ground on the day of admission, EMS called.  The patient was intubated at the ER.  The patient was hypothermic initially then has been running high-grade fever with temperature max of 105, hypotensive, on pressors.  CT head showed no acute abnormality, CTA chest showed no evidence of PE, CT abdomen pelvis showed pancolitis with abnormal wall thickening of the ascending, transverse, descending colon, sigmoid colon and rectum.   Initial lactic acid 3.3, WBC 11.5, procalcitonin 11.4  COVID-19 influenza combo negative.  Blood cultures grew Haemophilus    Social History Narrative    Not on file     Social Determinants of Health     Financial Resource Strain: Low Risk  (11/14/2023)    Overall Financial Resource Strain (CARDIA)     Difficulty of Paying Living Expenses: Not hard at all   Food Insecurity: Not on file (11/14/2023)   Transportation Needs: Unknown (11/14/2023)    PRAPARE - Transportation     Lack of Transportation (Medical): Not on file     Lack of Transportation (Non-Medical): No   Physical Activity: Unknown (9/30/2022)    Exercise Vital Sign     Days of Exercise per Week: 0 days     Minutes of Exercise per Session: Not on file   Stress: Stress Concern Present (9/30/2022)    [place] [employer] - Occupational Stress Questionnaire     Feeling of Stress : To some extent   Social Connections: Socially Integrated (9/30/2022)    Social Connection and Isolation Panel [NHANES]     Frequency of Communication with Friends and Family: More than three times a week     Frequency of Social Gatherings with Friends and Family: Once a week     Attends [employer] Services: More than 4 times per year     Active Member of Clubs or Organizations: Yes     Attends Club or Organization Meetings: More than 4 times per year     Marital Status:    Intimate Partner Violence: Not At Risk (9/30/2022)    Humiliation, Afraid, Rape, and Kick questionnaire     Fear of Current or Ex-Partner: No     Emotionally Abused: No     Physically Abused: No     Sexually Abused: No   Housing Stability: Unknown (11/14/2023)    Housing Stability Vital Sign     Unable to Pay for Housing in the Last Year: Not on file     Number of Places Lived in the Last Year: Not on file     Unstable Housing in the Last Year: No       Family History:     Family History   Problem Relation Age of Onset    Heart Attack Mother     Diabetes Mother     Diabetes Father     Heart Attack Father       Medical Decision Making:   I have independently reviewed/ordered the following labs:    CBC with

## 2024-05-29 NOTE — PLAN OF CARE
RN)  Maintains optimal cardiac output and hemodynamic stability: Monitor urine output and notify Licensed Independent Practitioner for values outside of normal range     Problem: Cardiovascular - Adult  Goal: Absence of cardiac dysrhythmias or at baseline  5/29/2024 0320 by Lizzette Booker RN  Outcome: Progressing  Flowsheets  Taken 5/29/2024 0320 by Lizzette Booker RN  Absence of cardiac dysrhythmias or at baseline:   Monitor cardiac rate and rhythm   Assess for signs of decreased cardiac output  Taken 5/28/2024 1600 by Makeda Sprague RN  Absence of cardiac dysrhythmias or at baseline: Monitor cardiac rate and rhythm  Note: NSR on monitor, BP elevated, Labetolol given once this shift     Problem: Skin/Tissue Integrity - Adult  Goal: Skin integrity remains intact  5/29/2024 0320 by Lizzette Booker RN  Outcome: Progressing  Flowsheets (Taken 5/28/2024 1600 by Makeda Sprague RN)  Skin Integrity Remains Intact: Monitor for areas of redness and/or skin breakdown     Problem: Skin/Tissue Integrity - Adult  Goal: Incisions, wounds, or drain sites healing without S/S of infection  5/29/2024 0320 by Lizzette Booker RN  Outcome: Progressing     Problem: Skin/Tissue Integrity - Adult  Goal: Oral mucous membranes remain intact  5/29/2024 0320 by Lizzette Booker RN  Outcome: Progressing  Flowsheets (Taken 5/28/2024 1600 by Makeda Sprague RN)  Oral Mucous Membranes Remain Intact: Assess oral mucosa and hygiene practices     Problem: Gastrointestinal - Adult  Goal: Minimal or absence of nausea and vomiting  5/29/2024 0320 by Lizzette Booker RN  Outcome: Progressing  Flowsheets  Taken 5/29/2024 0320 by Lizzette Booker RN  Minimal or absence of nausea and vomiting: Advance diet as tolerated, if ordered  Taken 5/28/2024 1600 by Makeda Sprague RN  Minimal or absence of nausea and vomiting: Maintain NPO status until nausea and vomiting are resolved  Note: Tubefeeding at 15ml. tolerating     Problem: Gastrointestinal - Adult  Goal: Maintains  or returns to baseline bowel function  5/29/2024 0320 by Lizzette Booker RN  Outcome: Progressing  Flowsheets (Taken 5/28/2024 1600 by Makeda Sprague RN)  Maintains or returns to baseline bowel function: Assess bowel function     Problem: Infection - Adult  Goal: Absence of infection at discharge  5/29/2024 0320 by Lizzette Booker RN  Outcome: Progressing  Flowsheets  Taken 5/29/2024 0320 by Lizzette Booker RN  Absence of infection at discharge:   Assess and monitor for signs and symptoms of infection   Monitor lab/diagnostic results   Monitor all insertion sites i.e., indwelling lines, tubes and drains   Administer medications as ordered  Taken 5/28/2024 1600 by Makeda Sprague RN  Absence of infection at discharge: Assess and monitor for signs and symptoms of infection  Note: Iv antibiotics continued, temp monitored every 4 hrs     Problem: Infection - Adult  Goal: Absence of infection during hospitalization  5/29/2024 0320 by Lizzette Booker RN  Outcome: Progressing  Flowsheets (Taken 5/28/2024 1600 by Makeda Sprague RN)  Absence of infection during hospitalization: Assess and monitor for signs and symptoms of infection     Problem: Infection - Adult  Goal: Absence of fever/infection during anticipated neutropenic period  5/29/2024 0320 by Lizzette Booker RN  Outcome: Progressing  Flowsheets (Taken 5/28/2024 1600 by Makeda Sprague RN)  Absence of fever/infection during anticipated neutropenic period: Monitor white blood cell count     Problem: Hematologic - Adult  Goal: Maintains hematologic stability  5/29/2024 0320 by Lizzette Booker RN  Outcome: Progressing  Flowsheets (Taken 5/28/2024 1600 by Makeda Sprague RN)  Maintains hematologic stability: Assess for signs and symptoms of bleeding or hemorrhage     Problem: Chronic Conditions and Co-morbidities  Goal: Patient's chronic conditions and co-morbidity symptoms are monitored and maintained or improved  Outcome: Progressing  Flowsheets  Taken 5/29/2024 0320 by Jhony

## 2024-05-29 NOTE — PROGRESS NOTES
Attempted to contact  for consent for the LP with no answer. VM left, staff notified to call IR if he arrives to the hospital .

## 2024-05-29 NOTE — PLAN OF CARE
Problem: Safety - Medical Restraint  Goal: Remains free of injury from restraints (Restraint for Interference with Medical Device)  Description: INTERVENTIONS:  1. Determine that other, less restrictive measures have been tried or would not be effective before applying the restraint  2. Evaluate the patient's condition at the time of restraint application  3. Inform patient/family regarding the reason for restraint  4. Q2H: Monitor safety, psychosocial status, comfort, nutrition and hydration  5/29/2024 0702 by Lizzette Booker RN  Outcome: Progressing  Flowsheets  Taken 5/29/2024 0702 by Lizzette Booker RN  Remains free of injury from restraints (restraint for interference with medical device):   Every 2 hours: Monitor safety, psychosocial status, comfort, nutrition and hydration   Determine that other, less restrictive measures have been tried or would not be effective before applying the restraint  Taken 5/29/2024 0600 by Misael Calderon  Remains free of injury from restraints (restraint for interference with medical device):   Determine that other, less restrictive measures have been tried or would not be effective before applying the restraint   Evaluate the patient's condition at the time of restraint application   Inform patient/family regarding the reason for restraint   Every 2 hours: Monitor safety, psychosocial status, comfort, nutrition and hydration  Taken 5/29/2024 0400 by Misael Calderon  Remains free of injury from restraints (restraint for interference with medical device):   Determine that other, less restrictive measures have been tried or would not be effective before applying the restraint   Evaluate the patient's condition at the time of restraint application   Inform patient/family regarding the reason for restraint   Every 2 hours: Monitor safety, psychosocial status, comfort, nutrition and hydration  Note: Bilat soft wrist restraints continued

## 2024-05-29 NOTE — OR NURSING
Prone on table. Low back is prepped and draped. Numbed and accessed by Dr Martin. Obtained 8 ml turbulent fluid. Needle removed, band aid applied. Report to RN. Specimen to lab

## 2024-05-29 NOTE — PLAN OF CARE
Problem: Safety - Medical Restraint  Goal: Remains free of injury from restraints (Restraint for Interference with Medical Device)  Description: INTERVENTIONS:  1. Determine that other, less restrictive measures have been tried or would not be effective before applying the restraint  2. Evaluate the patient's condition at the time of restraint application  3. Inform patient/family regarding the reason for restraint  4. Q2H: Monitor safety, psychosocial status, comfort, nutrition and hydration  5/29/2024 1805 by Clare Yoder RN  Outcome: Progressing  Flowsheets  Taken 5/29/2024 1616 by Makeda Sprague RN  Remains free of injury from restraints (restraint for interference with medical device):   Determine that other, less restrictive measures have been tried or would not be effective before applying the restraint   Evaluate the patient's condition at the time of restraint application   Inform patient/family regarding the reason for restraint   Every 2 hours: Monitor safety, psychosocial status, comfort, nutrition and hydration  Taken 5/29/2024 1600 by Makeda Sprague RN  Remains free of injury from restraints (restraint for interference with medical device):   Evaluate the patient's condition at the time of restraint application   Determine that other, less restrictive measures have been tried or would not be effective before applying the restraint   Inform patient/family regarding the reason for restraint   Every 2 hours: Monitor safety, psychosocial status, comfort, nutrition and hydration  Note: Patients hands will go towards tubes when restraints are removed.   5/29/2024 1513 by Makeda Sprague RN  Outcome: Progressing  Flowsheets  Taken 5/29/2024 1000  Remains free of injury from restraints (restraint for interference with medical device):   Determine that other, less restrictive measures have been tried or would not be effective before applying the restraint   Evaluate the patient's condition at the time of  patient's condition at the time of restraint application   Inform patient/family regarding the reason for restraint   Every 2 hours: Monitor safety, psychosocial status, comfort, nutrition and hydration  Note: Bilat soft wrist restraints continued

## 2024-05-29 NOTE — PROGRESS NOTES
Dr. Huffman at bedside. Notified of restlessness and tachypnea while sedation is off. Notified that patient is not currently following commands. MD states no plan to wean today due to tachypnea. MD would like PRN fentanyl given prior to LP and MD placing orders for Norcuron in case needed for LP.

## 2024-05-29 NOTE — PROGRESS NOTES
Dr. Alarcon and resident MD Valente at bedside. Writer inquired about restarting patient's lisinopril as patient required labetalol over night and Requip due to restless leg syndrome. Notified that even when patient is sedated, patient continuously moving legs. Additionally, writer asked MD if we were able to change PO vanco capsule to oral solution like we were giving yesterday as we are unable to give capsule down OGT. Awaiting order placement.

## 2024-05-29 NOTE — PROGRESS NOTES
GI Progress notes    5/29/2024   9:43 AM    Name:  Kavya De Santiago  MRN:    [id number]     Acct:     [id number]   Room:  2003/2003-01  IP Day: 3     Admit Date: 5/26/2024  8:51 AM  PCP: Shaina Hamilton MD    Subjective:     C/C:   Chief Complaint   Patient presents with    Loss of Consciousness       Interval History: Status: not changed.     Patient seen and examined.  No acute events overnight.  Remains intubated, sedated.  Abdomen soft  BS active  Tolerating TF  No reported BM     ROS:  Unable to assess:  intubated, sedated    Medications:     Allergies:   Allergies   Allergen Reactions    Codeine Nausea Only       Current Meds: miconazole (MICOTIN) 2 % powder, BID  enoxaparin Sodium (LOVENOX) injection 30 mg, BID  vancomycin (VANCOCIN) 750 mg in sodium chloride 0.9 % 250 mL IVPB ([id number]), Q12H  vancomycin (FIRVANQ) 50 MG/ML oral solution 125 mg, 4 times per day  lisinopril (PRINIVIL;ZESTRIL) tablet 20 mg, Daily  vecuronium (NORCURON) injection 10 mg, Once PRN  rOPINIRole (REQUIP) tablet 2 mg, BID  pantoprazole (PROTONIX) 40 mg in sodium chloride (PF) 0.9 % 10 mL injection, Daily  [START ON 5/30/2024] insulin glargine (LANTUS) injection vial 10 Units, Daily  insulin glargine (LANTUS) injection vial 5 Units, Once  fentaNYL (SUBLIMAZE) injection 50 mcg, Q30 Min PRN  insulin lispro (HUMALOG,ADMELOG) injection vial 0-8 Units, Q4H  0.9 % sodium chloride infusion, Continuous  [Held by provider] vancomycin (VANCOCIN) capsule 125 mg, 4x Daily  cefTRIAXone (ROCEPHIN) 2,000 mg in sodium chloride 0.9 % 50 mL IVPB (mini-bag), Q12H  propofol infusion, Continuous  sodium chloride flush 0.9 % injection 10 mL, PRN  aspirin EC tablet 81 mg, Daily  atorvastatin (LIPITOR) tablet 40 mg, Nightly  latanoprost (XALATAN) 0.005 % ophthalmic solution 1 drop, Nightly  busPIRone (BUSPAR) tablet 10 mg, TID  [Held by provider] venlafaxine (EFFEXOR XR) extended release capsule 75 mg, Daily  [Held by provider] propranolol (INDERAL LA)  (24Hr):    Intake/Output Summary (Last 24 hours) at 5/29/2024 0943  Last data filed at 5/29/2024 0800  Gross per 24 hour   Intake 3348.31 ml   Output 1750 ml   Net 1598.31 ml       Labs:      CBC:   Lab Results   Component Value Date/Time    WBC 10.1 05/29/2024 06:22 AM    RBC 2.85 05/29/2024 06:22 AM    HGB 7.9 05/29/2024 06:22 AM    HCT 24.5 05/29/2024 06:22 AM    MCV 85.9 05/29/2024 06:22 AM    MCH 27.7 05/29/2024 06:22 AM    MCHC 32.2 05/29/2024 06:22 AM    RDW 13.8 05/29/2024 06:22 AM     05/29/2024 06:22 AM    MPV 8.1 05/29/2024 06:22 AM     CBC with Differential:    Lab Results   Component Value Date/Time    WBC 10.1 05/29/2024 06:22 AM    RBC 2.85 05/29/2024 06:22 AM    HGB 7.9 05/29/2024 06:22 AM    HCT 24.5 05/29/2024 06:22 AM     05/29/2024 06:22 AM    MCV 85.9 05/29/2024 06:22 AM    MCH 27.7 05/29/2024 06:22 AM    MCHC 32.2 05/29/2024 06:22 AM    RDW 13.8 05/29/2024 06:22 AM    LYMPHOPCT 11 05/29/2024 06:22 AM    MONOPCT 7 05/29/2024 06:22 AM    EOSPCT 4 05/29/2024 06:22 AM    BASOPCT 0 05/29/2024 06:22 AM    MONOSABS 0.71 05/29/2024 06:22 AM    LYMPHSABS 2.22 04/11/2023 10:48 AM    EOSABS 0.40 05/29/2024 06:22 AM    BASOSABS 0.00 05/29/2024 06:22 AM    DIFFTYPE NOT REPORTED 07/21/2021 10:15 AM     Hemoglobin/Hematocrit:    Lab Results   Component Value Date/Time    HGB 7.9 05/29/2024 06:22 AM    HCT 24.5 05/29/2024 06:22 AM     CMP:    Lab Results   Component Value Date/Time     05/29/2024 04:46 AM    K 3.4 05/29/2024 04:46 AM     05/29/2024 04:46 AM    CO2 18 05/29/2024 04:46 AM    BUN 10 05/29/2024 04:46 AM    CREATININE 0.6 05/29/2024 04:46 AM    GFRAA >60 07/06/2022 03:40 PM    LABGLOM >90 05/29/2024 04:46 AM    LABGLOM >60 11/14/2023 10:17 AM    GLUCOSE 208 05/29/2024 04:46 AM    GLUCOSE 137 05/19/2012 12:05 PM    PROT 6.7 05/19/2012 12:05 PM    CALCIUM 7.1 05/29/2024 04:46 AM    BILITOT 0.4 05/26/2024 08:54 AM    ALKPHOS 139 05/26/2024 08:54 AM    AST 30 05/26/2024 08:54

## 2024-05-29 NOTE — CARE COORDINATION
ONGOING DISCHARGE PLAN:    Patient is intubated.  VENT 30%    +Haemophilus Influenza  5/26 +BC x2, repeat BC x2 5/28  IV rocephin/vanco per ID    POD #0 IR Lumbar Puncture    GI consult-colitis, vomiting, diarrhea  oral vanco-empirically    Hgb 7.9    PT/OT    Will continue to follow for additional discharge needs.    If patient is discharged prior to next notation, then this note serves as note for discharge by case management.    Electronically signed by Daksha Saha RN on 5/29/2024 at 4:17 PM

## 2024-05-29 NOTE — PROGRESS NOTES
Senior resident on unit. Writer asked if we are able to discontinue c-diff order as patient has not had bowel movement in 4 days.

## 2024-05-29 NOTE — PLAN OF CARE
Problem: Safety - Medical Restraint  Goal: Remains free of injury from restraints (Restraint for Interference with Medical Device)  Description: INTERVENTIONS:  1. Determine that other, less restrictive measures have been tried or would not be effective before applying the restraint  2. Evaluate the patient's condition at the time of restraint application  3. Inform patient/family regarding the reason for restraint  4. Q2H: Monitor safety, psychosocial status, comfort, nutrition and hydration  5/29/2024 1513 by Makeda Sprague, RN  Outcome: Progressing  Flowsheets  Taken 5/29/2024 1000  Remains free of injury from restraints (restraint for interference with medical device):   Determine that other, less restrictive measures have been tried or would not be effective before applying the restraint   Evaluate the patient's condition at the time of restraint application   Inform patient/family regarding the reason for restraint   Every 2 hours: Monitor safety, psychosocial status, comfort, nutrition and hydration  Taken 5/29/2024 0800  Remains free of injury from restraints (restraint for interference with medical device):   Determine that other, less restrictive measures have been tried or would not be effective before applying the restraint   Evaluate the patient's condition at the time of restraint application   Inform patient/family regarding the reason for restraint   Every 2 hours: Monitor safety, psychosocial status, comfort, nutrition and hydration  Note: Attempts to pull at tubes when released. ROM assessed every 2 hours and visual safety checks completed hourly. Restraints maintained to preserve the patient's airway.    Problem: Safety - Adult  Goal: Free from fall injury  5/29/2024 1513 by Makeda Sprague, RN  Outcome: Progressing  Flowsheets (Taken 5/29/2024 0800)  Free From Fall Injury: Instruct family/caregiver on patient safety       Problem: Discharge Planning  Goal: Discharge to home or other facility with

## 2024-05-30 ENCOUNTER — APPOINTMENT (OUTPATIENT)
Dept: GENERAL RADIOLOGY | Age: 64
End: 2024-05-30
Payer: COMMERCIAL

## 2024-05-30 LAB
ANION GAP SERPL CALCULATED.3IONS-SCNC: 12 MMOL/L (ref 9–17)
ARTERIAL PATENCY WRIST A: ABNORMAL
BASOPHILS # BLD: 0 K/UL (ref 0–0.2)
BASOPHILS NFR BLD: 1 % (ref 0–2)
BDY SITE: ABNORMAL
BODY TEMPERATURE: 37
BUN SERPL-MCNC: 8 MG/DL (ref 8–23)
CALCIUM SERPL-MCNC: 7.9 MG/DL (ref 8.6–10.4)
CHLORIDE SERPL-SCNC: 108 MMOL/L (ref 98–107)
CO2 SERPL-SCNC: 22 MMOL/L (ref 20–31)
COHGB MFR BLD: 1.4 % (ref 0–5)
CREAT SERPL-MCNC: 0.6 MG/DL (ref 0.5–0.9)
EOSINOPHIL # BLD: 0.6 K/UL (ref 0–0.4)
EOSINOPHILS RELATIVE PERCENT: 7 % (ref 0–4)
ERYTHROCYTE [DISTWIDTH] IN BLOOD BY AUTOMATED COUNT: 14 % (ref 11.5–14.9)
FIO2 ON VENT: 30 %
GAS FLOW.O2 O2 DELIVERY SYS: ABNORMAL L/MIN
GAS FLOW.O2 SETTING OXYMISER: 20 L/MIN
GFR, ESTIMATED: >90 ML/MIN/1.73M2
GLUCOSE BLD-MCNC: 197 MG/DL (ref 65–105)
GLUCOSE BLD-MCNC: 205 MG/DL (ref 65–105)
GLUCOSE BLD-MCNC: 214 MG/DL (ref 65–105)
GLUCOSE BLD-MCNC: 218 MG/DL (ref 65–105)
GLUCOSE BLD-MCNC: 226 MG/DL (ref 65–105)
GLUCOSE BLD-MCNC: 228 MG/DL (ref 65–105)
GLUCOSE SERPL-MCNC: 246 MG/DL (ref 70–99)
HCO3 ARTERIAL: 25.5 MMOL/L (ref 22–26)
HCT VFR BLD AUTO: 26.2 % (ref 36–46)
HGB BLD-MCNC: 8.4 G/DL (ref 12–16)
LYMPHOCYTES NFR BLD: 1.3 K/UL (ref 1–4.8)
LYMPHOCYTES RELATIVE PERCENT: 16 % (ref 24–44)
MAGNESIUM SERPL-MCNC: 1.7 MG/DL (ref 1.6–2.6)
MCH RBC QN AUTO: 27.5 PG (ref 26–34)
MCHC RBC AUTO-ENTMCNC: 32 G/DL (ref 31–37)
MCV RBC AUTO: 86 FL (ref 80–100)
METHEMOGLOBIN: 1.3 % (ref 0–1.9)
MONOCYTES NFR BLD: 0.7 K/UL (ref 0.1–1.3)
MONOCYTES NFR BLD: 8 % (ref 1–7)
NEUTROPHILS NFR BLD: 68 % (ref 36–66)
NEUTS SEG NFR BLD: 5.5 K/UL (ref 1.3–9.1)
O2 SAT, ARTERIAL: 91.3 % (ref 95–98)
PCO2 ARTERIAL: 36.9 MMHG (ref 35–45)
PEEP RESPIRATORY: 7 CM[H2O]
PH ARTERIAL: 7.45 (ref 7.35–7.45)
PLATELET # BLD AUTO: 231 K/UL (ref 150–450)
PMV BLD AUTO: 8 FL (ref 6–12)
PO2 ARTERIAL: 65.7 MMHG (ref 80–100)
POSITIVE BASE EXCESS, ART: 1.5 MMOL/L (ref 0–2)
POTASSIUM SERPL-SCNC: 3.1 MMOL/L (ref 3.7–5.3)
POTASSIUM SERPL-SCNC: 3.6 MMOL/L (ref 3.7–5.3)
PT. POSITION: ABNORMAL
RBC # BLD AUTO: 3.04 M/UL (ref 4–5.2)
RESPIRATORY RATE: 20
SODIUM SERPL-SCNC: 142 MMOL/L (ref 135–144)
TEXT FOR RESPIRATORY: ABNORMAL
TOTAL RATE: 20
VENTILATION MODE VENT: ABNORMAL
VT: 500
WBC OTHER # BLD: 8.1 K/UL (ref 3.5–11)

## 2024-05-30 PROCEDURE — 6360000002 HC RX W HCPCS: Performed by: INTERNAL MEDICINE

## 2024-05-30 PROCEDURE — 84132 ASSAY OF SERUM POTASSIUM: CPT

## 2024-05-30 PROCEDURE — 82947 ASSAY GLUCOSE BLOOD QUANT: CPT

## 2024-05-30 PROCEDURE — 82805 BLOOD GASES W/O2 SATURATION: CPT

## 2024-05-30 PROCEDURE — 6370000000 HC RX 637 (ALT 250 FOR IP): Performed by: INTERNAL MEDICINE

## 2024-05-30 PROCEDURE — 2580000003 HC RX 258: Performed by: INTERNAL MEDICINE

## 2024-05-30 PROCEDURE — 6370000000 HC RX 637 (ALT 250 FOR IP)

## 2024-05-30 PROCEDURE — 36600 WITHDRAWAL OF ARTERIAL BLOOD: CPT

## 2024-05-30 PROCEDURE — 85025 COMPLETE CBC W/AUTO DIFF WBC: CPT

## 2024-05-30 PROCEDURE — 2700000000 HC OXYGEN THERAPY PER DAY

## 2024-05-30 PROCEDURE — 99233 SBSQ HOSP IP/OBS HIGH 50: CPT | Performed by: INTERNAL MEDICINE

## 2024-05-30 PROCEDURE — 6360000002 HC RX W HCPCS

## 2024-05-30 PROCEDURE — 83735 ASSAY OF MAGNESIUM: CPT

## 2024-05-30 PROCEDURE — C9113 INJ PANTOPRAZOLE SODIUM, VIA: HCPCS | Performed by: INTERNAL MEDICINE

## 2024-05-30 PROCEDURE — 99232 SBSQ HOSP IP/OBS MODERATE 35: CPT | Performed by: INTERNAL MEDICINE

## 2024-05-30 PROCEDURE — 80048 BASIC METABOLIC PNL TOTAL CA: CPT

## 2024-05-30 PROCEDURE — 99291 CRITICAL CARE FIRST HOUR: CPT | Performed by: INTERNAL MEDICINE

## 2024-05-30 PROCEDURE — 94003 VENT MGMT INPAT SUBQ DAY: CPT

## 2024-05-30 PROCEDURE — 2580000003 HC RX 258

## 2024-05-30 PROCEDURE — A4216 STERILE WATER/SALINE, 10 ML: HCPCS | Performed by: INTERNAL MEDICINE

## 2024-05-30 PROCEDURE — 94761 N-INVAS EAR/PLS OXIMETRY MLT: CPT

## 2024-05-30 PROCEDURE — 71045 X-RAY EXAM CHEST 1 VIEW: CPT

## 2024-05-30 PROCEDURE — 2000000000 HC ICU R&B

## 2024-05-30 PROCEDURE — APPSS30 APP SPLIT SHARED TIME 16-30 MINUTES: Performed by: NURSE PRACTITIONER

## 2024-05-30 PROCEDURE — 36415 COLL VENOUS BLD VENIPUNCTURE: CPT

## 2024-05-30 RX ORDER — INSULIN GLARGINE 100 [IU]/ML
4 INJECTION, SOLUTION SUBCUTANEOUS ONCE
Status: COMPLETED | OUTPATIENT
Start: 2024-05-30 | End: 2024-05-30

## 2024-05-30 RX ORDER — POTASSIUM CHLORIDE 7.45 MG/ML
10 INJECTION INTRAVENOUS
Status: COMPLETED | OUTPATIENT
Start: 2024-05-30 | End: 2024-05-30

## 2024-05-30 RX ORDER — INSULIN GLARGINE 100 [IU]/ML
14 INJECTION, SOLUTION SUBCUTANEOUS DAILY
Status: DISCONTINUED | OUTPATIENT
Start: 2024-05-31 | End: 2024-05-31

## 2024-05-30 RX ADMIN — POTASSIUM CHLORIDE 10 MEQ: 7.46 INJECTION, SOLUTION INTRAVENOUS at 08:55

## 2024-05-30 RX ADMIN — PROPOFOL 50 MCG/KG/MIN: 10 INJECTION, EMULSION INTRAVENOUS at 23:49

## 2024-05-30 RX ADMIN — VANCOMYCIN HYDROCHLORIDE 125 MG: KIT at 09:53

## 2024-05-30 RX ADMIN — INSULIN LISPRO 2 UNITS: 100 INJECTION, SOLUTION INTRAVENOUS; SUBCUTANEOUS at 03:39

## 2024-05-30 RX ADMIN — BUSPIRONE HYDROCHLORIDE 10 MG: 10 TABLET ORAL at 13:09

## 2024-05-30 RX ADMIN — CEFTRIAXONE SODIUM 2000 MG: 2 INJECTION, POWDER, FOR SOLUTION INTRAMUSCULAR; INTRAVENOUS at 16:12

## 2024-05-30 RX ADMIN — ACETAMINOPHEN 650 MG: 325 TABLET ORAL at 15:38

## 2024-05-30 RX ADMIN — SODIUM CHLORIDE 2.5 MG/HR: 9 INJECTION, SOLUTION INTRAVENOUS at 06:46

## 2024-05-30 RX ADMIN — METOPROLOL TARTRATE 25 MG: 25 TABLET, FILM COATED ORAL at 09:53

## 2024-05-30 RX ADMIN — VANCOMYCIN HYDROCHLORIDE 125 MG: KIT at 15:38

## 2024-05-30 RX ADMIN — PROPOFOL 50 MCG/KG/MIN: 10 INJECTION, EMULSION INTRAVENOUS at 16:10

## 2024-05-30 RX ADMIN — ROPINIROLE HYDROCHLORIDE 2 MG: 2 TABLET, FILM COATED ORAL at 20:56

## 2024-05-30 RX ADMIN — POTASSIUM CHLORIDE 10 MEQ: 7.46 INJECTION, SOLUTION INTRAVENOUS at 10:01

## 2024-05-30 RX ADMIN — VANCOMYCIN HYDROCHLORIDE 125 MG: KIT at 03:35

## 2024-05-30 RX ADMIN — INSULIN LISPRO 2 UNITS: 100 INJECTION, SOLUTION INTRAVENOUS; SUBCUTANEOUS at 20:56

## 2024-05-30 RX ADMIN — ATORVASTATIN CALCIUM 40 MG: 40 TABLET, FILM COATED ORAL at 20:56

## 2024-05-30 RX ADMIN — PROPOFOL 50 MCG/KG/MIN: 10 INJECTION, EMULSION INTRAVENOUS at 20:55

## 2024-05-30 RX ADMIN — SODIUM CHLORIDE 2.5 MG/HR: 9 INJECTION, SOLUTION INTRAVENOUS at 17:54

## 2024-05-30 RX ADMIN — INSULIN LISPRO 2 UNITS: 100 INJECTION, SOLUTION INTRAVENOUS; SUBCUTANEOUS at 16:12

## 2024-05-30 RX ADMIN — ANTI-FUNGAL POWDER MICONAZOLE NITRATE TALC FREE: 1.42 POWDER TOPICAL at 20:57

## 2024-05-30 RX ADMIN — INSULIN LISPRO 2 UNITS: 100 INJECTION, SOLUTION INTRAVENOUS; SUBCUTANEOUS at 07:39

## 2024-05-30 RX ADMIN — BUSPIRONE HYDROCHLORIDE 10 MG: 10 TABLET ORAL at 07:38

## 2024-05-30 RX ADMIN — SODIUM CHLORIDE, PRESERVATIVE FREE 10 ML: 5 INJECTION INTRAVENOUS at 20:56

## 2024-05-30 RX ADMIN — POTASSIUM CHLORIDE 10 MEQ: 7.46 INJECTION, SOLUTION INTRAVENOUS at 07:51

## 2024-05-30 RX ADMIN — PROPOFOL 35 MCG/KG/MIN: 10 INJECTION, EMULSION INTRAVENOUS at 11:35

## 2024-05-30 RX ADMIN — INSULIN GLARGINE 10 UNITS: 100 INJECTION, SOLUTION SUBCUTANEOUS at 07:39

## 2024-05-30 RX ADMIN — ANTI-FUNGAL POWDER MICONAZOLE NITRATE TALC FREE: 1.42 POWDER TOPICAL at 07:59

## 2024-05-30 RX ADMIN — PROPOFOL 50 MCG/KG/MIN: 10 INJECTION, EMULSION INTRAVENOUS at 03:43

## 2024-05-30 RX ADMIN — VANCOMYCIN HYDROCHLORIDE 125 MG: KIT at 20:58

## 2024-05-30 RX ADMIN — METOPROLOL TARTRATE 75 MG: 50 TABLET, FILM COATED ORAL at 20:56

## 2024-05-30 RX ADMIN — FENTANYL CITRATE 50 MCG: 0.05 INJECTION, SOLUTION INTRAMUSCULAR; INTRAVENOUS at 13:08

## 2024-05-30 RX ADMIN — METOPROLOL TARTRATE 50 MG: 50 TABLET, FILM COATED ORAL at 07:40

## 2024-05-30 RX ADMIN — ENOXAPARIN SODIUM 30 MG: 100 INJECTION SUBCUTANEOUS at 07:40

## 2024-05-30 RX ADMIN — LATANOPROST 1 DROP: 50 SOLUTION OPHTHALMIC at 20:57

## 2024-05-30 RX ADMIN — FENTANYL CITRATE 50 MCG: 0.05 INJECTION, SOLUTION INTRAMUSCULAR; INTRAVENOUS at 18:45

## 2024-05-30 RX ADMIN — LISINOPRIL 20 MG: 20 TABLET ORAL at 07:40

## 2024-05-30 RX ADMIN — BUSPIRONE HYDROCHLORIDE 10 MG: 10 TABLET ORAL at 20:57

## 2024-05-30 RX ADMIN — POTASSIUM CHLORIDE 10 MEQ: 7.46 INJECTION, SOLUTION INTRAVENOUS at 11:15

## 2024-05-30 RX ADMIN — PROPOFOL 50 MCG/KG/MIN: 10 INJECTION, EMULSION INTRAVENOUS at 07:03

## 2024-05-30 RX ADMIN — ROPINIROLE HYDROCHLORIDE 2 MG: 2 TABLET, FILM COATED ORAL at 07:40

## 2024-05-30 RX ADMIN — ENOXAPARIN SODIUM 30 MG: 100 INJECTION SUBCUTANEOUS at 20:56

## 2024-05-30 RX ADMIN — SODIUM CHLORIDE, PRESERVATIVE FREE 10 ML: 5 INJECTION INTRAVENOUS at 07:45

## 2024-05-30 RX ADMIN — INSULIN GLARGINE 4 UNITS: 100 INJECTION, SOLUTION SUBCUTANEOUS at 09:53

## 2024-05-30 RX ADMIN — CEFTRIAXONE SODIUM 2000 MG: 2 INJECTION, POWDER, FOR SOLUTION INTRAMUSCULAR; INTRAVENOUS at 03:36

## 2024-05-30 RX ADMIN — INSULIN LISPRO 2 UNITS: 100 INJECTION, SOLUTION INTRAVENOUS; SUBCUTANEOUS at 23:50

## 2024-05-30 RX ADMIN — FENTANYL CITRATE 50 MCG: 0.05 INJECTION, SOLUTION INTRAMUSCULAR; INTRAVENOUS at 09:07

## 2024-05-30 RX ADMIN — PANTOPRAZOLE SODIUM 40 MG: 40 INJECTION, POWDER, FOR SOLUTION INTRAVENOUS at 07:39

## 2024-05-30 RX ADMIN — FENTANYL CITRATE 50 MCG: 0.05 INJECTION, SOLUTION INTRAMUSCULAR; INTRAVENOUS at 20:56

## 2024-05-30 RX ADMIN — FENTANYL CITRATE 50 MCG: 0.05 INJECTION, SOLUTION INTRAMUSCULAR; INTRAVENOUS at 11:34

## 2024-05-30 ASSESSMENT — PULMONARY FUNCTION TESTS
PIF_VALUE: 23
PIF_VALUE: 19
PIF_VALUE: 32
PIF_VALUE: 32
PIF_VALUE: 27
PIF_VALUE: 30
PIF_VALUE: 23
PIF_VALUE: 30
PIF_VALUE: 25
PIF_VALUE: 27
PIF_VALUE: 30
PIF_VALUE: 35
PIF_VALUE: 23
PIF_VALUE: 31
PIF_VALUE: 27
PIF_VALUE: 26
PIF_VALUE: 22
PIF_VALUE: 26
PIF_VALUE: 21
PIF_VALUE: 28
PIF_VALUE: 25
PIF_VALUE: 28
PIF_VALUE: 31
PIF_VALUE: 30
PIF_VALUE: 28
PIF_VALUE: 24
PIF_VALUE: 25
PIF_VALUE: 23
PIF_VALUE: 17
PIF_VALUE: 18
PIF_VALUE: 25
PIF_VALUE: 33
PIF_VALUE: 28
PIF_VALUE: 27
PIF_VALUE: 33
PIF_VALUE: 17
PIF_VALUE: 43
PIF_VALUE: 28
PIF_VALUE: 23
PIF_VALUE: 25
PIF_VALUE: 25
PIF_VALUE: 17
PIF_VALUE: 29
PIF_VALUE: 24
PIF_VALUE: 21
PIF_VALUE: 24
PIF_VALUE: 29
PIF_VALUE: 28
PIF_VALUE: 26
PIF_VALUE: 25
PIF_VALUE: 29
PIF_VALUE: 25
PIF_VALUE: 23
PIF_VALUE: 29
PIF_VALUE: 24
PIF_VALUE: 27
PIF_VALUE: 26
PIF_VALUE: 26
PIF_VALUE: 28
PIF_VALUE: 25
PIF_VALUE: 22
PIF_VALUE: 24
PIF_VALUE: 28
PIF_VALUE: 27
PIF_VALUE: 28
PIF_VALUE: 29
PIF_VALUE: 25
PIF_VALUE: 23
PIF_VALUE: 23
PIF_VALUE: 30
PIF_VALUE: 24
PIF_VALUE: 30
PIF_VALUE: 23
PIF_VALUE: 28
PIF_VALUE: 17
PIF_VALUE: 37
PIF_VALUE: 21
PIF_VALUE: 25
PIF_VALUE: 42
PIF_VALUE: 26
PIF_VALUE: 26
PIF_VALUE: 25
PIF_VALUE: 30
PIF_VALUE: 28
PIF_VALUE: 25
PIF_VALUE: 27
PIF_VALUE: 29
PIF_VALUE: 25
PIF_VALUE: 25
PIF_VALUE: 42
PIF_VALUE: 32
PIF_VALUE: 19
PIF_VALUE: 21
PIF_VALUE: 28
PIF_VALUE: 30
PIF_VALUE: 25
PIF_VALUE: 20
PIF_VALUE: 21
PIF_VALUE: 30
PIF_VALUE: 21
PIF_VALUE: 21
PIF_VALUE: 28
PIF_VALUE: 25
PIF_VALUE: 23

## 2024-05-30 NOTE — PROGRESS NOTES
Comprehensive Nutrition Assessment    Type and Reason for Visit:  Reassess    Nutrition Recommendations/Plan:   Continue tube feeding regimen as ordered.     Malnutrition Assessment:  Malnutrition Status:  Insufficient data (24 1501)    Context:  Acute Illness     Findings of the 6 clinical characteristics of malnutrition:  Energy Intake:  Mild decrease in energy intake (Comment)  Weight Loss:  Unable to assess     Body Fat Loss:  Unable to assess     Muscle Mass Loss:  Unable to assess    Fluid Accumulation:  Moderate to Severe Extremities   Strength:  Not Performed    Nutrition Assessment:    Pt remains intubated with Propofol at 13.6 ml providin kcals.  Dr. Huffman increased tube feedings to 45 ml/hr providin kcals, 91 gm protein.  Had lumbar puncture yesterday revealing haemophilus influenza.    Nutrition Related Findings:    Edema: +2 non-pitting LUE, +3 non-pitting RUE, +1 pitting BLE's.  BM- . Labs & meds reviewed. Wound Type: None       Current Nutrition Intake & Therapies:    Average Meal Intake: NPO     Current Tube Feeding (TF) Orders:  Feeding Route: Nasogastric  Formula: Diabetic  Schedule: Continuous  Feeding Regimen: 45 ml (per MD)  Water Flushes: 200 ml every 6 hours per MD  Current TF & Flush Orders Provides: 1620 kcals, 91 gm protein    Anthropometric Measures:  Height: 157.5 cm (5' 2\")  Ideal Body Weight (IBW): 110 lbs (50 kg)    Admission Body Weight: 104.8 kg (231 lb)  Current Body Weight: 104.8 kg (231 lb), 181.8 % IBW. Weight Source: Bed Scale  Current BMI (kg/m2): 42.2                          BMI Categories: Obese Class 2 (BMI 35.0 -39.9)    Estimated Daily Nutrient Needs:  Energy Requirements Based On: Formula  Weight Used for Energy Requirements: Admission  Energy (kcal/day): 0418-6464 kcals based on [place]-St[place] Jeor with 1.3-1.4 factor  Weight Used for Protein Requirements: Ideal  Protein (g/day):  gm protein based on 1.8-2 gm/kg  Method Used for Fluid  Requirements: Other (Comment) (per MD)      Nutrition Diagnosis:   Inadequate oral intake related to impaired respiratory function as evidenced by NPO or clear liquid status due to medical condition, intubation    Nutrition Interventions:   Food and/or Nutrient Delivery: Continue NPO, Continue Current Tube Feeding  Nutrition Education/Counseling: No recommendation at this time  Coordination of Nutrition Care: Continue to monitor while inpatient       Goals:     Goals: Tolerate nutrition support at goal rate       Nutrition Monitoring and Evaluation:      Food/Nutrient Intake Outcomes: Enteral Nutrition Intake/Tolerance  Physical Signs/Symptoms Outcomes: Biochemical Data, Nutrition Focused Physical Findings, Skin, Weight    Discharge Planning:    Too soon to determine     Josee Stahl RD, LD  [phone]     (4) patient too young to ambulate or walks frequently

## 2024-05-30 NOTE — PROGRESS NOTES
[facility] -   Infectious diseases evaluation  admission date 5/26/2024    reason for consultation:   High fever, unknown etiology sepsis    Impression :   Current:  Haemophilus influenza meningitis/ bacteremia  Sepsis secondary to above  Colitis  Diabetes mellitus  Chronic kidney disease  Hyperlipidemia  Hypertension  GERD  Pagan      HENCE:     IV ceftriaxone 2 g every 12 hours  IV  vancomycin was discontinued  Stool for C. difficile and GI panel pending   oral vancomycin.  Nasal swab for MRSA negative  Respiratory culture grew normal elli  Follow CBC and renal function  Continue supportive care      Infection Control Recommendations   [place] Precautions  Droplet Isolation      Antimicrobial Stewardship Recommendations   Simplification of therapy  Targeted therapy      History of Present Illness:   Initial history:  Kavya De Santiago is a 64 y.o.-year-old female was brought to the hospital for altered mental status associated with nausea, vomiting and diarrhea.  The patient is intubated, unable to provide history that was obtained from chart review and  at the bedside had upper respiratory symptoms, congestion, headache and rhinorrhea for 1 week associated with dry cough and decreased appetite.  The patient was found on the ground on the day of admission, EMS called.  The patient was intubated at the ER.  The patient was hypothermic initially then has been running high-grade fever with temperature max of 105, hypotensive, on pressors.  CT head showed no acute abnormality, CTA chest showed no evidence of PE, CT abdomen pelvis showed pancolitis with abnormal wall thickening of the ascending, transverse, descending colon, sigmoid colon and rectum.   Initial lactic acid 3.3, WBC 11.5, procalcitonin 11.4  COVID-19 influenza combo negative.  Blood cultures grew Haemophilus influenzae    Interval changes  5/30/2024   She was seen and examined, temperature max

## 2024-05-30 NOTE — PLAN OF CARE
Problem: Safety - Medical Restraint  Goal: Remains free of injury from restraints (Restraint for Interference with Medical Device)  Description: INTERVENTIONS:  1. Determine that other, less restrictive measures have been tried or would not be effective before applying the restraint  2. Evaluate the patient's condition at the time of restraint application  3. Inform patient/family regarding the reason for restraint  4. Q2H: Monitor safety, psychosocial status, comfort, nutrition and hydration  5/30/2024 1932 by Belen Huffman RN  Outcome: Progressing  Flowsheets (Taken 5/30/2024 0927)  Remains free of injury from restraints (restraint for interference with medical device):   Determine that other, less restrictive measures have been tried or would not be effective before applying the restraint   Evaluate the patient's condition at the time of restraint application   Inform patient/family regarding the reason for restraint   Every 2 hours: Monitor safety, psychosocial status, comfort, nutrition and hydration  5/30/2024 0536 by Alexandra Borrero RN  Outcome: Progressing     Problem: Safety - Adult  Goal: Free from fall injury  5/30/2024 1932 by Belen Huffman RN  Outcome: Progressing  Flowsheets (Taken 5/30/2024 0800)  Free From Fall Injury:   Instruct family/caregiver on patient safety   Based on caregiver fall risk screen, instruct family/caregiver to ask for assistance with transferring infant if caregiver noted to have fall risk factors  5/30/2024 0536 by Alexandra Borrero RN  Outcome: Progressing     Problem: Discharge Planning  Goal: Discharge to home or other facility with appropriate resources  5/30/2024 1932 by Belen Huffman RN  Outcome: Progressing  5/30/2024 0536 by Alexandra Borrero RN  Outcome: Progressing  Flowsheets (Taken 5/29/2024 1600 by Makeda Sprague RN)  Discharge to home or other facility with appropriate resources: Identify barriers to discharge with patient and  RN  Outcome: Progressing  5/30/2024 0536 by Alexandra Borrero RN  Outcome: Progressing     Problem: Coping  Goal: Pt/Family able to verbalize concerns and demonstrate effective coping strategies  Description: INTERVENTIONS:  1. Assist patient/family to identify coping skills, available support systems and cultural and spiritual values  2. Provide emotional support, including active listening and acknowledgement of concerns of patient and caregivers  3. Reduce environmental stimuli, as able  4. Instruct patient/family in relaxation techniques, as appropriate  5. Assess for spiritual pain/suffering and initiate Spiritual Care, Psychosocial Clinical Specialist consults as needed  5/30/2024 1932 by Belen Huffman RN  Outcome: Progressing  5/30/2024 0536 by Alexandra Borrero RN  Outcome: Progressing

## 2024-05-30 NOTE — PROGRESS NOTES
GI Progress notes    5/30/2024   10:33 AM    Name:  Kavya De Santiago  MRN:    [id number]     Acct:     [id number]   Room:  2003/2003-01  IP Day: 4     Admit Date: 5/26/2024  8:51 AM  PCP: Shaina Hamilton MD    Subjective:     C/C:   Chief Complaint   Patient presents with    Loss of Consciousness       Interval History: Status: not changed.     Patient seen and examined  Had LP yesterday, consistent with meningitis.    Tolerating TF  Having diarrhea after her TF were started  Stool studies pending    ROS:  Unable to assess    Medications:     Allergies:   Allergies   Allergen Reactions    Codeine Nausea Only       Current Meds: potassium chloride 10 mEq/100 mL IVPB (Peripheral Line), Q1H  [START ON 5/31/2024] insulin glargine (LANTUS) injection vial 14 Units, Daily  metoprolol tartrate (LOPRESSOR) tablet 75 mg, BID  miconazole (MICOTIN) 2 % powder, BID  enoxaparin Sodium (LOVENOX) injection 30 mg, BID  vancomycin (FIRVANQ) 50 MG/ML oral solution 125 mg, 4 times per day  lisinopril (PRINIVIL;ZESTRIL) tablet 20 mg, Daily  rOPINIRole (REQUIP) tablet 2 mg, BID  pantoprazole (PROTONIX) 40 mg in sodium chloride (PF) 0.9 % 10 mL injection, Daily  niCARdipine (CARDENE) 25 mg in sodium chloride 0.9 % 250 mL infusion, Continuous  fentaNYL (SUBLIMAZE) injection 50 mcg, Q30 Min PRN  insulin lispro (HUMALOG,ADMELOG) injection vial 0-8 Units, Q4H  0.9 % sodium chloride infusion, Continuous  cefTRIAXone (ROCEPHIN) 2,000 mg in sodium chloride 0.9 % 50 mL IVPB (mini-bag), Q12H  propofol infusion, Continuous  sodium chloride flush 0.9 % injection 10 mL, PRN  aspirin EC tablet 81 mg, Daily  atorvastatin (LIPITOR) tablet 40 mg, Nightly  latanoprost (XALATAN) 0.005 % ophthalmic solution 1 drop, Nightly  busPIRone (BUSPAR) tablet 10 mg, TID  [Held by provider] venlafaxine (EFFEXOR XR) extended release capsule 75 mg, Daily  sodium chloride flush 0.9 % injection 5-40 mL, 2 times per day  sodium chloride flush 0.9 % injection 5-40 mL,

## 2024-05-30 NOTE — PROGRESS NOTES
Physical Therapy          Physical Therapy Cancel Note      DATE: 2024    NAME: Kavya De Santiago  MRN: [id number]   : 1960      Patient not seen this date for Physical Therapy due to:    Other - Pt intubated/sedated; PT will continue to follow for evaluation when appropriate.        Electronically signed by Jazlyn Elias PT on 2024 at 12:39 PM

## 2024-05-30 NOTE — PROGRESS NOTES
[facility]   OCCUPATIONAL THERAPY MISSED TREATMENT NOTE   INPATIENT   Date: 24  Patient Name: Kavya De Santiago       Room:   MRN: [id number]   Account #: [id number]    : 1960  (64 y.o.)  Gender: female         REASON FOR MISSED TREATMENT:    24    -   Other - Pt intubated/sedated; OT will continue to follow for evaluation when appropriate.      09:15       Electronically signed by JOSH FAIRCHILD S/OT on 24 at 9:14 AM EDT

## 2024-05-30 NOTE — PROGRESS NOTES
condition->Emergency Medical Condition (MA) Reason for Exam: abdominal pain, vomtiing, diarrha.  cough, sob, hypoxia FINDINGS: Lower Chest: There is dependent atelectasis in the right and left lung bases. Organs:  Liver demonstrates no suspicious mass. There is no evidence of intrahepatic biliary ductal dilatation. The spleen, pancreas and adrenal glands are unremarkable. The kidneys demonstrate no evidence of hydronephrosis. GI/Bowel:  There is no evidence of bowel obstruction.  There is abnormal bowel wall thickening of the ascending, transverse, descending colon, sigmoid colon and rectum consistent with colitis. Pelvis:  No acute abnormality of the pelvis organs or bladder. Peritoneum/Retroperitoneum: No evidence of ascites or free air. Bones/Soft Tissues: There is umbilical hernia containing fat measures 2 cm.     1. Abnormal bowel wall thickening of the ascending, transverse, descending colon, sigmoid colon and rectum consistent with colitis. 2. Umbilical hernia containing fat measures 2 cm.     CT HEAD WO CONTRAST    Result Date: 5/26/2024  EXAMINATION: CT OF THE HEAD WITHOUT CONTRAST  5/26/2024 9:16 am TECHNIQUE: CT of the head was performed without the administration of intravenous contrast. Automated exposure control, iterative reconstruction, and/or weight based adjustment of the mA/kV was utilized to reduce the radiation dose to as low as reasonably achievable. COMPARISON: None. HISTORY: ORDERING SYSTEM PROVIDED HISTORY: ams, vomiting, TECHNOLOGIST PROVIDED HISTORY: ams, vomiting, Decision Support Exception - unselect if not a suspected or confirmed emergency medical condition->Emergency Medical Condition (MA) Reason for Exam: Found down, LT pupil more reactive than RT. Nausea, vomiting.  Intubated in route FINDINGS: BRAIN/VENTRICLES: There is no acute intracranial hemorrhage, mass effect or midline shift.  No abnormal extra-axial fluid collection.  The gray-white differentiation is maintained without

## 2024-05-30 NOTE — PROGRESS NOTES
ICU Progress Note (Vent)   [facility]    Patient - Kavya De Santiago,  Age - 64 y.o.    - 1960      Room Number -    MRN -  [id number]   [id number]  Date of Admission -  2024  8:51 AM    Events of Past 24 Hours     Patient had LP yesterday      Vitals    height is 1.575 m (5' 2\") and weight is 105 kg (231 lb 7.7 oz). Her temperature is 99.1 °F (37.3 °C). Her blood pressure is 133/58 (abnormal) and her pulse is 74. Her respiration is 24 and oxygen saturation is 92%.       Temperature Range: Temp: 99.1 °F (37.3 °C) Temp  Av.1 °F (37.3 °C)  Min: 97.5 °F (36.4 °C)  Max: 100.9 °F (38.3 °C)  BP Range:  Systolic (24hrs), Av , Min:118 , Max:195     Diastolic (24hrs), Av, Min:54, Max:155    Pulse Range: Pulse  Av.9  Min: 66  Max: 93  Respiration Range: Resp  Av.9  Min: 14  Max: 28  Current Pulse Ox::  SpO2: 92 %  24HR Pulse Ox Range:  SpO2  Av.6 %  Min: 91 %  Max: 100 %  Oxygen Amount and Delivery:        Wt Readings from Last 3 Encounters:   24 105 kg (231 lb 7.7 oz)   24 91.4 kg (201 lb 9.6 oz)   10/03/23 90.1 kg (198 lb 9.6 oz)     I/O     Intake/Output Summary (Last 24 hours) at 2024 0855  Last data filed at 2024 0800  Gross per 24 hour   Intake 4674.63 ml   Output 4975 ml   Net -300.37 ml       I/O last 3 completed shifts:  In: 6460.7 [I.V.:4198.1; NG/GT:1323; IV Piggyback:939.6]  Out: 6125 [Urine:6125]     DRAIN/TUBE OUTPUT:     Invasive Lines   ETT Day -   5  Lines -right internal jugular central line 5    ICP PRESSURE RANGE:  No data recorded  CVP PRESSURE RANGE:  No data recorded  Mechanical Ventilation Data   SETTINGS (Comprehensive)  Vent Information  Ventilator Day(s): 1  Ventilator ID: [id number]  Equipment Changed: Suction catheter  Vent Mode: AC/PRVC  Additional Respiratory Assessments  Pulse: 74  Respirations: 24  SpO2: 92 %  ETCO2 (mmHg): 29 mmHg  Humidification Source:  atraumatic.   Lungs - Chest expands equally, no wheezes, rales or rhonchi.  Diminished  Cardiovascular - Heart sounds are normal.  normal rate and rhythm regular, no murmur, gallop or rub.  Abdomen - soft, nontender, nondistended, no masses or organomegaly  Neurologic -no obvious focal deficits but cannot assess fully  Skin - no bruising or bleeding  Extremities - no cyanosis, clubbing or edema    Lab Results   CBC     Lab Results   Component Value Date/Time    WBC 8.1 05/30/2024 04:47 AM    RBC 3.04 05/30/2024 04:47 AM    HGB 8.4 05/30/2024 04:47 AM    HCT 26.2 05/30/2024 04:47 AM     05/30/2024 04:47 AM    MCV 86.0 05/30/2024 04:47 AM    MCH 27.5 05/30/2024 04:47 AM    MCHC 32.0 05/30/2024 04:47 AM    RDW 14.0 05/30/2024 04:47 AM    LYMPHOPCT 16 05/30/2024 04:47 AM    MONOPCT 8 05/30/2024 04:47 AM    EOSPCT 7 05/30/2024 04:47 AM    BASOPCT 1 05/30/2024 04:47 AM    MONOSABS 0.70 05/30/2024 04:47 AM    LYMPHSABS 2.22 04/11/2023 10:48 AM    EOSABS 0.60 05/30/2024 04:47 AM    BASOSABS 0.00 05/30/2024 04:47 AM    DIFFTYPE NOT REPORTED 07/21/2021 10:15 AM       BMP   Lab Results   Component Value Date/Time     05/30/2024 04:47 AM    K 3.1 05/30/2024 04:47 AM     05/30/2024 04:47 AM    CO2 22 05/30/2024 04:47 AM    BUN 8 05/30/2024 04:47 AM    CREATININE 0.6 05/30/2024 04:47 AM    GLUCOSE 246 05/30/2024 04:47 AM    GLUCOSE 137 05/19/2012 12:05 PM    CALCIUM 7.9 05/30/2024 04:47 AM    MG 1.7 05/30/2024 04:47 AM    PHOS 4.1 04/11/2023 10:48 AM       LFTS  Lab Results   Component Value Date/Time    ALKPHOS 119 05/29/2024 04:46 AM    ALT 46 05/29/2024 04:46 AM    AST 72 05/29/2024 04:46 AM    PROT 6.7 05/19/2012 12:05 PM    BILITOT 0.2 05/29/2024 04:46 AM    BILIDIR 0.1 05/29/2024 04:46 AM    IBILI 0.1 05/29/2024 04:46 AM       INR  No results for input(s): \"PROTIME\", \"INR\" in the last 72 hours.      APTT  Recent Labs     05/28/24  0741   APTT 31.2         Lactic Acid  Lab Results   Component Value

## 2024-05-30 NOTE — CARE COORDINATION
ONGOING DISCHARGE PLAN:    Patient is intubated.  VENT 30%      Spoke with patient's spouse  at bedside regarding discharge plan and patient confirms that plan is [facility] if needed. Discussed possible LTACH. [facility] preferred if necessary.     +Haemophilus Influenza  5/26 +BC x2, repeat BC 5/28  IV rocephin 2G every 12 hours per ID      POD #1 LP  +CSF haemophilus influenza    GI consult-GI panel pending  oral vanco-empirically  Hgb 8.4    Cardene drip    PT/OT    Will continue to follow for additional discharge needs.    If patient is discharged prior to next notation, then this note serves as note for discharge by case management.    Electronically signed by Daksha Saha RN on 5/30/2024 at 4:20 PM

## 2024-05-30 NOTE — PLAN OF CARE
Problem: Safety - Medical Restraint  Goal: Remains free of injury from restraints (Restraint for Interference with Medical Device)  Description: INTERVENTIONS:  1. Determine that other, less restrictive measures have been tried or would not be effective before applying the restraint  2. Evaluate the patient's condition at the time of restraint application  3. Inform patient/family regarding the reason for restraint  4. Q2H: Monitor safety, psychosocial status, comfort, nutrition and hydration  5/30/2024 0536 by Alexandra Borrero RN  Outcome: Progressing  5/29/2024 1805 by Clare Yoder RN  Outcome: Progressing  Flowsheets  Taken 5/29/2024 1800 by Makeda Sprague RN  Remains free of injury from restraints (restraint for interference with medical device):   Determine that other, less restrictive measures have been tried or would not be effective before applying the restraint   Evaluate the patient's condition at the time of restraint application   Inform patient/family regarding the reason for restraint   Every 2 hours: Monitor safety, psychosocial status, comfort, nutrition and hydration  Taken 5/29/2024 1616 by Makeda Sprague RN  Remains free of injury from restraints (restraint for interference with medical device):   Determine that other, less restrictive measures have been tried or would not be effective before applying the restraint   Evaluate the patient's condition at the time of restraint application   Inform patient/family regarding the reason for restraint   Every 2 hours: Monitor safety, psychosocial status, comfort, nutrition and hydration  Taken 5/29/2024 1600 by Makeda Sprague RN  Remains free of injury from restraints (restraint for interference with medical device):   Evaluate the patient's condition at the time of restraint application   Determine that other, less restrictive measures have been tried or would not be effective before applying the restraint   Inform patient/family regarding the reason

## 2024-05-31 ENCOUNTER — APPOINTMENT (OUTPATIENT)
Dept: GENERAL RADIOLOGY | Age: 64
End: 2024-05-31
Payer: COMMERCIAL

## 2024-05-31 ENCOUNTER — APPOINTMENT (OUTPATIENT)
Dept: INTERVENTIONAL RADIOLOGY/VASCULAR | Age: 64
End: 2024-05-31
Attending: INTERNAL MEDICINE
Payer: COMMERCIAL

## 2024-05-31 PROBLEM — K52.9 COLITIS: Status: ACTIVE | Noted: 2024-05-31

## 2024-05-31 LAB
ANION GAP SERPL CALCULATED.3IONS-SCNC: 8 MMOL/L (ref 9–17)
ARTERIAL PATENCY WRIST A: ABNORMAL
BASOPHILS # BLD: 0 K/UL (ref 0–0.2)
BASOPHILS NFR BLD: 1 % (ref 0–2)
BDY SITE: ABNORMAL
BODY TEMPERATURE: 37
BUN SERPL-MCNC: 8 MG/DL (ref 8–23)
CALCIUM SERPL-MCNC: 8.1 MG/DL (ref 8.6–10.4)
CAMPYLOBACTER DNA SPEC NAA+PROBE: NORMAL
CHLORIDE SERPL-SCNC: 109 MMOL/L (ref 98–107)
CO2 SERPL-SCNC: 26 MMOL/L (ref 20–31)
COHGB MFR BLD: 2.3 % (ref 0–5)
CREAT SERPL-MCNC: 0.6 MG/DL (ref 0.5–0.9)
EOSINOPHIL # BLD: 0.5 K/UL (ref 0–0.4)
EOSINOPHILS RELATIVE PERCENT: 7 % (ref 0–4)
ERYTHROCYTE [DISTWIDTH] IN BLOOD BY AUTOMATED COUNT: 13.8 % (ref 11.5–14.9)
ETEC ELTA+ESTB GENES STL QL NAA+PROBE: NORMAL
FIO2 ON VENT: 30 %
GAS FLOW.O2 O2 DELIVERY SYS: ABNORMAL L/MIN
GAS FLOW.O2 SETTING OXYMISER: 20 L/MIN
GFR, ESTIMATED: >90 ML/MIN/1.73M2
GLUCOSE BLD-MCNC: 180 MG/DL (ref 65–105)
GLUCOSE BLD-MCNC: 207 MG/DL (ref 65–105)
GLUCOSE BLD-MCNC: 216 MG/DL (ref 65–105)
GLUCOSE BLD-MCNC: 245 MG/DL (ref 65–105)
GLUCOSE BLD-MCNC: 251 MG/DL (ref 65–105)
GLUCOSE SERPL-MCNC: 277 MG/DL (ref 70–99)
HCO3 ARTERIAL: 24 MMOL/L (ref 22–26)
HCT VFR BLD AUTO: 25.3 % (ref 36–46)
HGB BLD-MCNC: 8.3 G/DL (ref 12–16)
LYMPHOCYTES NFR BLD: 1.2 K/UL (ref 1–4.8)
LYMPHOCYTES RELATIVE PERCENT: 15 % (ref 24–44)
MAGNESIUM SERPL-MCNC: 1.5 MG/DL (ref 1.6–2.6)
MCH RBC QN AUTO: 27.9 PG (ref 26–34)
MCHC RBC AUTO-ENTMCNC: 32.6 G/DL (ref 31–37)
MCV RBC AUTO: 85.4 FL (ref 80–100)
METHEMOGLOBIN: 0 % (ref 0–1.9)
MONOCYTES NFR BLD: 0.8 K/UL (ref 0.1–1.3)
MONOCYTES NFR BLD: 11 % (ref 1–7)
NEUTROPHILS NFR BLD: 66 % (ref 36–66)
NEUTS SEG NFR BLD: 5.3 K/UL (ref 1.3–9.1)
O2 SAT, ARTERIAL: 94.8 % (ref 95–98)
P SHIGELLOIDES DNA STL QL NAA+PROBE: NORMAL
PCO2 ARTERIAL: 31.9 MMHG (ref 35–45)
PEEP RESPIRATORY: 7 CM[H2O]
PH ARTERIAL: 7.49 (ref 7.35–7.45)
PLATELET # BLD AUTO: 238 K/UL (ref 150–450)
PMV BLD AUTO: 7.6 FL (ref 6–12)
PO2 ARTERIAL: 79.7 MMHG (ref 80–100)
POSITIVE BASE EXCESS, ART: 0.9 MMOL/L (ref 0–2)
POTASSIUM SERPL-SCNC: 3.3 MMOL/L (ref 3.7–5.3)
POTASSIUM SERPL-SCNC: 3.5 MMOL/L (ref 3.7–5.3)
PT. POSITION: ABNORMAL
RBC # BLD AUTO: 2.96 M/UL (ref 4–5.2)
RESPIRATORY RATE: 20
SALMONELLA DNA SPEC QL NAA+PROBE: NORMAL
SHIGA TOXIN STX GENE SPEC NAA+PROBE: NORMAL
SHIGELLA DNA SPEC QL NAA+PROBE: NORMAL
SODIUM SERPL-SCNC: 143 MMOL/L (ref 135–144)
SPECIMEN DESCRIPTION: NORMAL
TEXT FOR RESPIRATORY: ABNORMAL
TOTAL RATE: 22
V CHOL+PARA RFBL+TRKH+TNAA STL QL NAA+PR: NORMAL
VENTILATION MODE VENT: ABNORMAL
VT: 500
WBC OTHER # BLD: 7.9 K/UL (ref 3.5–11)
Y ENTERO RECN STL QL NAA+PROBE: NORMAL

## 2024-05-31 PROCEDURE — APPSS30 APP SPLIT SHARED TIME 16-30 MINUTES: Performed by: NURSE PRACTITIONER

## 2024-05-31 PROCEDURE — 2580000003 HC RX 258

## 2024-05-31 PROCEDURE — 6370000000 HC RX 637 (ALT 250 FOR IP): Performed by: INTERNAL MEDICINE

## 2024-05-31 PROCEDURE — C1713 ANCHOR/SCREW BN/BN,TIS/BN: HCPCS

## 2024-05-31 PROCEDURE — 6360000002 HC RX W HCPCS: Performed by: INTERNAL MEDICINE

## 2024-05-31 PROCEDURE — C9113 INJ PANTOPRAZOLE SODIUM, VIA: HCPCS | Performed by: INTERNAL MEDICINE

## 2024-05-31 PROCEDURE — 82805 BLOOD GASES W/O2 SATURATION: CPT

## 2024-05-31 PROCEDURE — 6360000002 HC RX W HCPCS

## 2024-05-31 PROCEDURE — 80048 BASIC METABOLIC PNL TOTAL CA: CPT

## 2024-05-31 PROCEDURE — 6370000000 HC RX 637 (ALT 250 FOR IP)

## 2024-05-31 PROCEDURE — 99291 CRITICAL CARE FIRST HOUR: CPT | Performed by: INTERNAL MEDICINE

## 2024-05-31 PROCEDURE — 51701 INSERT BLADDER CATHETER: CPT

## 2024-05-31 PROCEDURE — 2580000003 HC RX 258: Performed by: INTERNAL MEDICINE

## 2024-05-31 PROCEDURE — 51798 US URINE CAPACITY MEASURE: CPT

## 2024-05-31 PROCEDURE — 83735 ASSAY OF MAGNESIUM: CPT

## 2024-05-31 PROCEDURE — 94003 VENT MGMT INPAT SUBQ DAY: CPT

## 2024-05-31 PROCEDURE — 99233 SBSQ HOSP IP/OBS HIGH 50: CPT | Performed by: INTERNAL MEDICINE

## 2024-05-31 PROCEDURE — 85025 COMPLETE CBC W/AUTO DIFF WBC: CPT

## 2024-05-31 PROCEDURE — 94761 N-INVAS EAR/PLS OXIMETRY MLT: CPT

## 2024-05-31 PROCEDURE — 2500000003 HC RX 250 WO HCPCS

## 2024-05-31 PROCEDURE — 2700000000 HC OXYGEN THERAPY PER DAY

## 2024-05-31 PROCEDURE — A4216 STERILE WATER/SALINE, 10 ML: HCPCS | Performed by: INTERNAL MEDICINE

## 2024-05-31 PROCEDURE — 71045 X-RAY EXAM CHEST 1 VIEW: CPT

## 2024-05-31 PROCEDURE — 84132 ASSAY OF SERUM POTASSIUM: CPT

## 2024-05-31 PROCEDURE — 36573 INSJ PICC RS&I 5 YR+: CPT

## 2024-05-31 PROCEDURE — 36600 WITHDRAWAL OF ARTERIAL BLOOD: CPT

## 2024-05-31 PROCEDURE — 6360000002 HC RX W HCPCS: Performed by: NURSE PRACTITIONER

## 2024-05-31 PROCEDURE — 2000000000 HC ICU R&B

## 2024-05-31 PROCEDURE — 82947 ASSAY GLUCOSE BLOOD QUANT: CPT

## 2024-05-31 PROCEDURE — 36415 COLL VENOUS BLD VENIPUNCTURE: CPT

## 2024-05-31 PROCEDURE — 02HV33Z INSERTION OF INFUSION DEVICE INTO SUPERIOR VENA CAVA, PERCUTANEOUS APPROACH: ICD-10-PCS | Performed by: INTERNAL MEDICINE

## 2024-05-31 PROCEDURE — 99231 SBSQ HOSP IP/OBS SF/LOW 25: CPT | Performed by: INTERNAL MEDICINE

## 2024-05-31 PROCEDURE — 2709999900 IR FLUORO GUIDED CVA DEVICE PLMT/REPLACE/REMOVAL

## 2024-05-31 RX ORDER — INSULIN GLARGINE 100 [IU]/ML
20 INJECTION, SOLUTION SUBCUTANEOUS DAILY
Status: DISCONTINUED | OUTPATIENT
Start: 2024-05-31 | End: 2024-06-14 | Stop reason: HOSPADM

## 2024-05-31 RX ORDER — LISINOPRIL 10 MG/1
10 TABLET ORAL ONCE
Status: COMPLETED | OUTPATIENT
Start: 2024-05-31 | End: 2024-05-31

## 2024-05-31 RX ORDER — FUROSEMIDE 10 MG/ML
20 INJECTION INTRAMUSCULAR; INTRAVENOUS ONCE
Status: COMPLETED | OUTPATIENT
Start: 2024-05-31 | End: 2024-05-31

## 2024-05-31 RX ORDER — MAGNESIUM SULFATE HEPTAHYDRATE 40 MG/ML
2000 INJECTION, SOLUTION INTRAVENOUS ONCE
Status: COMPLETED | OUTPATIENT
Start: 2024-05-31 | End: 2024-05-31

## 2024-05-31 RX ORDER — METOPROLOL TARTRATE 50 MG/1
50 TABLET, FILM COATED ORAL 2 TIMES DAILY
Status: DISCONTINUED | OUTPATIENT
Start: 2024-05-31 | End: 2024-05-31

## 2024-05-31 RX ORDER — POTASSIUM CHLORIDE 7.45 MG/ML
10 INJECTION INTRAVENOUS
Status: DISPENSED | OUTPATIENT
Start: 2024-05-31 | End: 2024-05-31

## 2024-05-31 RX ADMIN — INSULIN LISPRO 2 UNITS: 100 INJECTION, SOLUTION INTRAVENOUS; SUBCUTANEOUS at 21:21

## 2024-05-31 RX ADMIN — FENTANYL CITRATE 50 MCG: 0.05 INJECTION, SOLUTION INTRAMUSCULAR; INTRAVENOUS at 21:39

## 2024-05-31 RX ADMIN — ACETAMINOPHEN 650 MG: 325 TABLET ORAL at 04:17

## 2024-05-31 RX ADMIN — BUSPIRONE HYDROCHLORIDE 10 MG: 10 TABLET ORAL at 14:50

## 2024-05-31 RX ADMIN — LISINOPRIL 10 MG: 10 TABLET ORAL at 10:31

## 2024-05-31 RX ADMIN — FENTANYL CITRATE 50 MCG: 0.05 INJECTION, SOLUTION INTRAMUSCULAR; INTRAVENOUS at 22:25

## 2024-05-31 RX ADMIN — VANCOMYCIN HYDROCHLORIDE 125 MG: KIT at 04:09

## 2024-05-31 RX ADMIN — LISINOPRIL 20 MG: 20 TABLET ORAL at 08:28

## 2024-05-31 RX ADMIN — PROPOFOL 50 MCG/KG/MIN: 10 INJECTION, EMULSION INTRAVENOUS at 15:05

## 2024-05-31 RX ADMIN — PANTOPRAZOLE SODIUM 40 MG: 40 INJECTION, POWDER, FOR SOLUTION INTRAVENOUS at 08:39

## 2024-05-31 RX ADMIN — VANCOMYCIN HYDROCHLORIDE 125 MG: KIT at 21:26

## 2024-05-31 RX ADMIN — METOPROLOL TARTRATE 75 MG: 50 TABLET, FILM COATED ORAL at 21:22

## 2024-05-31 RX ADMIN — PROPOFOL 50 MCG/KG/MIN: 10 INJECTION, EMULSION INTRAVENOUS at 03:58

## 2024-05-31 RX ADMIN — MAGNESIUM SULFATE HEPTAHYDRATE 2000 MG: 40 INJECTION, SOLUTION INTRAVENOUS at 08:02

## 2024-05-31 RX ADMIN — INSULIN GLARGINE 20 UNITS: 100 INJECTION, SOLUTION SUBCUTANEOUS at 08:30

## 2024-05-31 RX ADMIN — INSULIN LISPRO 2 UNITS: 100 INJECTION, SOLUTION INTRAVENOUS; SUBCUTANEOUS at 04:09

## 2024-05-31 RX ADMIN — FENTANYL CITRATE 50 MCG: 0.05 INJECTION, SOLUTION INTRAMUSCULAR; INTRAVENOUS at 16:54

## 2024-05-31 RX ADMIN — FENTANYL CITRATE 50 MCG: 0.05 INJECTION, SOLUTION INTRAMUSCULAR; INTRAVENOUS at 00:28

## 2024-05-31 RX ADMIN — POTASSIUM CHLORIDE 10 MEQ: 7.46 INJECTION, SOLUTION INTRAVENOUS at 10:30

## 2024-05-31 RX ADMIN — ANTI-FUNGAL POWDER MICONAZOLE NITRATE TALC FREE: 1.42 POWDER TOPICAL at 21:25

## 2024-05-31 RX ADMIN — ANTI-FUNGAL POWDER MICONAZOLE NITRATE TALC FREE: 1.42 POWDER TOPICAL at 08:27

## 2024-05-31 RX ADMIN — ENOXAPARIN SODIUM 30 MG: 100 INJECTION SUBCUTANEOUS at 21:24

## 2024-05-31 RX ADMIN — PROPOFOL 50 MCG/KG/MIN: 10 INJECTION, EMULSION INTRAVENOUS at 07:56

## 2024-05-31 RX ADMIN — LATANOPROST 1 DROP: 50 SOLUTION OPHTHALMIC at 21:24

## 2024-05-31 RX ADMIN — POTASSIUM CHLORIDE 10 MEQ: 7.46 INJECTION, SOLUTION INTRAVENOUS at 05:45

## 2024-05-31 RX ADMIN — ACETAMINOPHEN 650 MG: 325 TABLET ORAL at 14:50

## 2024-05-31 RX ADMIN — FENTANYL CITRATE 50 MCG: 0.05 INJECTION, SOLUTION INTRAMUSCULAR; INTRAVENOUS at 08:35

## 2024-05-31 RX ADMIN — FUROSEMIDE 20 MG: 10 INJECTION, SOLUTION INTRAMUSCULAR; INTRAVENOUS at 10:30

## 2024-05-31 RX ADMIN — FENTANYL CITRATE 50 MCG: 0.05 INJECTION, SOLUTION INTRAMUSCULAR; INTRAVENOUS at 13:35

## 2024-05-31 RX ADMIN — ENOXAPARIN SODIUM 30 MG: 100 INJECTION SUBCUTANEOUS at 08:29

## 2024-05-31 RX ADMIN — PROPOFOL 50 MCG/KG/MIN: 10 INJECTION, EMULSION INTRAVENOUS at 21:39

## 2024-05-31 RX ADMIN — VANCOMYCIN HYDROCHLORIDE 125 MG: KIT at 16:58

## 2024-05-31 RX ADMIN — FENTANYL CITRATE 50 MCG: 0.05 INJECTION, SOLUTION INTRAMUSCULAR; INTRAVENOUS at 19:05

## 2024-05-31 RX ADMIN — PROPOFOL 50 MCG/KG/MIN: 10 INJECTION, EMULSION INTRAVENOUS at 11:08

## 2024-05-31 RX ADMIN — CEFTRIAXONE SODIUM 2000 MG: 2 INJECTION, POWDER, FOR SOLUTION INTRAMUSCULAR; INTRAVENOUS at 17:29

## 2024-05-31 RX ADMIN — FENTANYL CITRATE 50 MCG: 0.05 INJECTION, SOLUTION INTRAMUSCULAR; INTRAVENOUS at 03:19

## 2024-05-31 RX ADMIN — VANCOMYCIN HYDROCHLORIDE 125 MG: KIT at 10:31

## 2024-05-31 RX ADMIN — CEFTRIAXONE SODIUM 2000 MG: 2 INJECTION, POWDER, FOR SOLUTION INTRAMUSCULAR; INTRAVENOUS at 04:16

## 2024-05-31 RX ADMIN — INSULIN LISPRO 2 UNITS: 100 INJECTION, SOLUTION INTRAVENOUS; SUBCUTANEOUS at 12:22

## 2024-05-31 RX ADMIN — PROPOFOL 50 MCG/KG/MIN: 10 INJECTION, EMULSION INTRAVENOUS at 18:23

## 2024-05-31 RX ADMIN — SODIUM CHLORIDE, PRESERVATIVE FREE 10 ML: 5 INJECTION INTRAVENOUS at 21:24

## 2024-05-31 RX ADMIN — INSULIN LISPRO 4 UNITS: 100 INJECTION, SOLUTION INTRAVENOUS; SUBCUTANEOUS at 08:30

## 2024-05-31 RX ADMIN — SODIUM CHLORIDE 2 MG/HR: 9 INJECTION, SOLUTION INTRAVENOUS at 15:22

## 2024-05-31 RX ADMIN — METOPROLOL TARTRATE 75 MG: 50 TABLET, FILM COATED ORAL at 08:28

## 2024-05-31 RX ADMIN — FENTANYL CITRATE 50 MCG: 0.05 INJECTION, SOLUTION INTRAMUSCULAR; INTRAVENOUS at 15:34

## 2024-05-31 RX ADMIN — BUSPIRONE HYDROCHLORIDE 10 MG: 10 TABLET ORAL at 08:28

## 2024-05-31 RX ADMIN — ROPINIROLE HYDROCHLORIDE 2 MG: 2 TABLET, FILM COATED ORAL at 08:28

## 2024-05-31 RX ADMIN — ATORVASTATIN CALCIUM 40 MG: 40 TABLET, FILM COATED ORAL at 21:23

## 2024-05-31 RX ADMIN — SODIUM CHLORIDE: 9 INJECTION, SOLUTION INTRAVENOUS at 15:00

## 2024-05-31 RX ADMIN — SODIUM CHLORIDE 2.5 MG/HR: 9 INJECTION, SOLUTION INTRAVENOUS at 03:57

## 2024-05-31 RX ADMIN — BUSPIRONE HYDROCHLORIDE 10 MG: 10 TABLET ORAL at 21:23

## 2024-05-31 RX ADMIN — POTASSIUM CHLORIDE 10 MEQ: 7.46 INJECTION, SOLUTION INTRAVENOUS at 08:41

## 2024-05-31 RX ADMIN — ROPINIROLE HYDROCHLORIDE 2 MG: 2 TABLET, FILM COATED ORAL at 21:23

## 2024-05-31 RX ADMIN — POTASSIUM CHLORIDE 10 MEQ: 7.46 INJECTION, SOLUTION INTRAVENOUS at 07:05

## 2024-05-31 ASSESSMENT — PULMONARY FUNCTION TESTS
PIF_VALUE: 32
PIF_VALUE: 27
PIF_VALUE: 28
PIF_VALUE: 31
PIF_VALUE: 25
PIF_VALUE: 29
PIF_VALUE: 25
PIF_VALUE: 26
PIF_VALUE: 30
PIF_VALUE: 26
PIF_VALUE: 37
PIF_VALUE: 16
PIF_VALUE: 29
PIF_VALUE: 26
PIF_VALUE: 28
PIF_VALUE: 27
PIF_VALUE: 29
PIF_VALUE: 27
PIF_VALUE: 32
PIF_VALUE: 26
PIF_VALUE: 26
PIF_VALUE: 22
PIF_VALUE: 31
PIF_VALUE: 31
PIF_VALUE: 28
PIF_VALUE: 28
PIF_VALUE: 26
PIF_VALUE: 26
PIF_VALUE: 22
PIF_VALUE: 30
PIF_VALUE: 35
PIF_VALUE: 23
PIF_VALUE: 32
PIF_VALUE: 33
PIF_VALUE: 28
PIF_VALUE: 32
PIF_VALUE: 31
PIF_VALUE: 33
PIF_VALUE: 27
PIF_VALUE: 26
PIF_VALUE: 31
PIF_VALUE: 20
PIF_VALUE: 30
PIF_VALUE: 30
PIF_VALUE: 26
PIF_VALUE: 30
PIF_VALUE: 29
PIF_VALUE: 25
PIF_VALUE: 30
PIF_VALUE: 34
PIF_VALUE: 23
PIF_VALUE: 28
PIF_VALUE: 27
PIF_VALUE: 27
PIF_VALUE: 26
PIF_VALUE: 30
PIF_VALUE: 31
PIF_VALUE: 28
PIF_VALUE: 29
PIF_VALUE: 32
PIF_VALUE: 31
PIF_VALUE: 28
PIF_VALUE: 22
PIF_VALUE: 27
PIF_VALUE: 28
PIF_VALUE: 31
PIF_VALUE: 26
PIF_VALUE: 30
PIF_VALUE: 35
PIF_VALUE: 30
PIF_VALUE: 19
PIF_VALUE: 30
PIF_VALUE: 30
PIF_VALUE: 27
PIF_VALUE: 27
PIF_VALUE: 29
PIF_VALUE: 30
PIF_VALUE: 35
PIF_VALUE: 36
PIF_VALUE: 25
PIF_VALUE: 26
PIF_VALUE: 29
PIF_VALUE: 27
PIF_VALUE: 37
PIF_VALUE: 28
PIF_VALUE: 30
PIF_VALUE: 30
PIF_VALUE: 27
PIF_VALUE: 30
PIF_VALUE: 33
PIF_VALUE: 29
PIF_VALUE: 34
PIF_VALUE: 27
PIF_VALUE: 30
PIF_VALUE: 32
PIF_VALUE: 29
PIF_VALUE: 27
PIF_VALUE: 27
PIF_VALUE: 21
PIF_VALUE: 31
PIF_VALUE: 31
PIF_VALUE: 22
PIF_VALUE: 26
PIF_VALUE: 23

## 2024-05-31 ASSESSMENT — PAIN SCALES - GENERAL: PAINLEVEL_OUTOF10: 5

## 2024-05-31 NOTE — PROGRESS NOTES
Dr Harris updated that PICC ordered since central line has been in 6 days. Ok for PICC from Dr Harris.

## 2024-05-31 NOTE — PROGRESS NOTES
[facility] -   Infectious diseases evaluation  admission date 5/26/2024    reason for consultation:   High fever, unknown etiology sepsis    Impression :   Current:  Haemophilus influenza meningitis/ bacteremia  Sepsis secondary to above  Colitis  Diabetes mellitus  Chronic kidney disease  Hyperlipidemia  Hypertension  GERD  Pagan      HENCE:     IV ceftriaxone 2 g every 12 hours  IV  vancomycin was discontinued  Stool for C. difficile not sent  GI panel negative   Oral vancomycin.  Nasal swab for MRSA negative  Respiratory culture grew normal elli  Follow CBC and renal function  Continue supportive care      Infection Control Recommendations   [place] Precautions  Droplet Isolation      Antimicrobial Stewardship Recommendations   Simplification of therapy  Targeted therapy      History of Present Illness:   Initial history:  Kavya De Santiago is a 64 y.o.-year-old female was brought to the hospital for altered mental status associated with nausea, vomiting and diarrhea.  The patient is intubated, unable to provide history that was obtained from chart review and  at the bedside had upper respiratory symptoms, congestion, headache and rhinorrhea for 1 week associated with dry cough and decreased appetite.  The patient was found on the ground on the day of admission, EMS called.  The patient was intubated at the ER.  The patient was hypothermic initially then has been running high-grade fever with temperature max of 105, hypotensive, on pressors.  CT head showed no acute abnormality, CTA chest showed no evidence of PE, CT abdomen pelvis showed pancolitis with abnormal wall thickening of the ascending, transverse, descending colon, sigmoid colon and rectum.   Initial lactic acid 3.3, WBC 11.5, procalcitonin 11.4  COVID-19 influenza combo negative.  Blood cultures grew Haemophilus influenzae    Interval changes  5/31/2024   She was seen and examined earlier  05/31/24  0405   WBC 8.1 7.9   HGB 8.4* 8.3*   HCT 26.2* 25.3*    238   LYMPHOPCT 16* 15*   MONOPCT 8* 11*   EOSPCT 7* 7*       BMP:  Recent Labs     05/30/24  0447 05/30/24  1548 05/31/24  0405     --  143   K 3.1* 3.6* 3.3*   *  --  109*   CO2 22  --  26   BUN 8  --  8   CREATININE 0.6  --  0.6   MG 1.7  --  1.5*       Hepatic Function Panel:   Recent Labs     05/29/24  0446   BILIDIR 0.1   IBILI 0.1   BILITOT 0.2*   ALKPHOS 119*   ALT 46*   AST 72*       No results for input(s): \"RPR\" in the last 72 hours.  No results for input(s): \"HIV\" in the last 72 hours.  No results for input(s): \"BC\" in the last 72 hours.  Lab Results   Component Value Date/Time    CREATININE 0.6 05/31/2024 04:05 AM    GLUCOSE 277 05/31/2024 04:05 AM    GLUCOSE 137 05/19/2012 12:05 PM       Detailed results:        Thank you for allowing us to participate in the care of this patient.Please call with questions.    This note is created with the assistance of a speech recognition program.  While intending to generate adocument that actually reflects the content of the visit, the document can still have some errors including those of syntax and sound a like substitutions which may escape proof reading.  It such instances, actual meaningcan be extrapolated by contextual diversion.    Joel Harris MD  Office: [phone]  Perfect serve / office [phone]

## 2024-05-31 NOTE — PROGRESS NOTES
[facility]  IN-PATIENT SERVICE  [facility]    PROGRESS NOTE             5/31/2024    8:51 AM    Name:   Kavya De Santiago  MRN:     [id number]     Acct:      [id number]   Room:   2003/2003-01  IP Day:  5  Admit Date:  5/26/2024  8:51 AM    PCP:  Shaina Hamilton MD  Code Status:  Full Code    SUBJECTIVE     Status: unchanged    Interval History:    Patient seen and examined at bedside this morning.  Remains sedated, intubated.  On Cardene drip for hypertension.  GI panel negative, C. difficile was never sent.  Continues on IV Rocephin for meningitis and oral vancomycin for colitis.  Potassium 3.3, replacing.    Brief History:    64-year-old female with history of T2DM, HTN, HLD, CKD, GERD, DIEGO, obesity who was brought to the ED for AMS, nausea, vomiting, diarrhea.  Patient is intubated at the time of my visit, her  Rj is present to answer questions.  Reportedly patient had some upper respiratory symptoms over the past week or so, with a dry cough, decreased appetite, rhinorrhea, congestion, headache/sinus pain.  This morning at 4 AM patient began to have diarrhea,  is unsure if it was bloody or not, and she also had several episodes of vomiting.  When he checked on her he found her on the ground, EMS was called, they arrived she has a GCS was 8-9, O2 saturations dropped on the way to the ED, given etomidate and Versed and placed a supraglottic airway, on arrival to the ED she was intubated.   CT head showed no acute abnormality, CTA chest showed no evidence of PE, CT abdomen pelvis showed pancolitis with abnormal wall thickening of the ascending, transverse, descending colon, sigmoid colon and rectum.  Lactate elevated 3.3.  Magnesium 0.9, replaced.  Leukocytosis with WBC 11.5.  Started on piperacillin/tazobactam.  Chest x-ray showed left basilar atelectasis/scarring versus infiltrate and/or small effusion. Blood sugar elevated 439 on arrival, ABG  pelvis, no diarrhea since admission.  On p.o. vancomycin,   Elevated blood pressure, currently on Cardene gtt.  Overall prognosis very poor,  Vent settings reviewed,  CC time 32 minutes      Electronically signed by Sixto Wu MD

## 2024-05-31 NOTE — PROGRESS NOTES
Physical Therapy        Physical Therapy Cancel Note      DATE: 2024    NAME: Kavya De Santiago  MRN: [id number]   : 1960      Patient not seen this date for Physical Therapy due to:    Pt remains on vent- will continue to follow      Electronically signed by Bobbi Marin PT on 2024 at 7:50 AM

## 2024-05-31 NOTE — PLAN OF CARE
Problem: Safety - Medical Restraint  Goal: Remains free of injury from restraints (Restraint for Interference with Medical Device)  Description: INTERVENTIONS:  1. Determine that other, less restrictive measures have been tried or would not be effective before applying the restraint  2. Evaluate the patient's condition at the time of restraint application  3. Inform patient/family regarding the reason for restraint  4. Q2H: Monitor safety, psychosocial status, comfort, nutrition and hydration  5/31/2024 1954 by Brooke Boateng, RN  Outcome: Progressing  Flowsheets (Taken 5/31/2024 1954)  Remains free of injury from restraints (restraint for interference with medical device):   Determine that other, less restrictive measures have been tried or would not be effective before applying the restraint   Evaluate the patient's condition at the time of restraint application   Inform patient/family regarding the reason for restraint   Every 2 hours: Monitor safety, psychosocial status, comfort, nutrition and hydration     Problem: Safety - Adult  Goal: Free from fall injury  5/31/2024 1954 by Brooke Boateng, RN  Outcome: Progressing  Flowsheets (Taken 5/31/2024 1954)  Free From Fall Injury: Instruct family/caregiver on patient safety     Problem: Discharge Planning  Goal: Discharge to home or other facility with appropriate resources  5/31/2024 1954 by Brooke Boateng, RN  Outcome: Progressing  Flowsheets (Taken 5/31/2024 1954)  Discharge to home or other facility with appropriate resources: Refer to discharge planning if patient needs post-hospital services based on physician order or complex needs related to functional status, cognitive ability or social support system     Problem: Pain  Goal: Verbalizes/displays adequate comfort level or baseline comfort level  5/31/2024 1954 by Brooke Boateng, RN  Outcome: Progressing  Flowsheets (Taken 5/31/2024 1954)  Verbalizes/displays adequate comfort level or baseline comfort level:    Function: Ensure adequate protection for wounds/incisions during mobilization     Problem: Musculoskeletal - Adult  Goal: Return ADL status to a safe level of function  Outcome: Progressing     Problem: Genitourinary - Adult  Goal: Absence of urinary retention  5/31/2024 1954 by Brooke Boateng, RN  Outcome: Progressing  Flowsheets (Taken 5/31/2024 1954)  Absence of urinary retention:   Assess patient’s ability to void and empty bladder   Monitor intake/output and perform bladder scan as needed     Problem: Genitourinary - Adult  Goal: Urinary catheter remains patent  Outcome: Progressing  Flowsheets (Taken 5/31/2024 1954)  Urinary catheter remains patent: Assess patency of urinary catheter     Problem: Metabolic/Fluid and Electrolytes - Adult  Goal: Electrolytes maintained within normal limits  5/31/2024 1954 by Brooke Boateng, RN  Outcome: Progressing  Flowsheets (Taken 5/31/2024 1954)  Electrolytes maintained within normal limits:   Monitor labs and assess patient for signs and symptoms of electrolyte imbalances   Administer electrolyte replacement as ordered   Monitor response to electrolyte replacements, including repeat lab results as appropriate     Problem: Metabolic/Fluid and Electrolytes - Adult  Goal: Glucose maintained within prescribed range  5/31/2024 1954 by Brooke Boateng, RN  Outcome: Progressing  Flowsheets (Taken 5/31/2024 1954)  Glucose maintained within prescribed range:   Monitor blood glucose as ordered   Assess for signs and symptoms of hyperglycemia and hypoglycemia   Administer ordered medications to maintain glucose within target range     Problem: Anxiety  Goal: Will report anxiety at manageable levels  Description: INTERVENTIONS:  1. Administer medication as ordered  2. Teach and rehearse alternative coping skills  3. Provide emotional support with 1:1 interaction with staff  Outcome: Progressing  Flowsheets (Taken 5/31/2024 1954)  Will report anxiety at manageable levels: Administer medication

## 2024-05-31 NOTE — PLAN OF CARE
Cardiovascular - Adult  Goal: Absence of cardiac dysrhythmias or at baseline  5/31/2024 1547 by Kristi Martel RN  Outcome: Progressing     Problem: Skin/Tissue Integrity - Adult  Goal: Skin integrity remains intact  5/31/2024 1547 by rKisti Martel RN  Outcome: Progressing     Problem: Skin/Tissue Integrity - Adult  Goal: Incisions, wounds, or drain sites healing without S/S of infection  5/31/2024 1547 by Kristi Martel RN  Outcome: Progressing     Problem: Skin/Tissue Integrity - Adult  Goal: Oral mucous membranes remain intact  5/31/2024 1547 by Kristi Martel RN  Outcome: Progressing     Problem: Gastrointestinal - Adult  Goal: Minimal or absence of nausea and vomiting  5/31/2024 1547 by Kristi Martel RN  Outcome: Progressing     Problem: Gastrointestinal - Adult  Goal: Maintains or returns to baseline bowel function  5/31/2024 1549 by Kristi Martel RN  Outcome: Progressing     Problem: Gastrointestinal - Adult  Goal: Maintains adequate nutritional intake  5/31/2024 1548 by Kristi Martel RN  Outcome: Progressing     Problem: Infection - Adult  Goal: Absence of infection at discharge  5/31/2024 1548 by Kristi Martel RN  Outcome: Progressing     Problem: Infection - Adult  Goal: Absence of infection during hospitalization  5/31/2024 1548 by Kristi Martel RN  Outcome: Progressing     Problem: Hematologic - Adult  Goal: Maintains hematologic stability  5/31/2024 1548 by Kristi Martel RN  Outcome: Progressing     Problem: Chronic Conditions and Co-morbidities  Goal: Patient's chronic conditions and co-morbidity symptoms are monitored and maintained or improved  5/31/2024 1550 by Kristi Martel RN  Outcome: Progressing     Problem: Nutrition Deficit:  Goal: Optimize nutritional status  5/31/2024 1550 by Kristi Martel RN  Outcome: Progressing     Problem: Neurosensory - Adult  Goal: Achieves stable or improved neurological status  5/31/2024 0435 by Adair  LEELA Morris  Outcome: Progressing     Problem: Neurosensory - Adult  Goal: Achieves maximal functionality and self care  5/31/2024 0435 by Alexandra Borrero RN  Outcome: Progressing     Problem: Respiratory - Adult  Goal: Achieves optimal ventilation and oxygenation  5/31/2024 1548 by Kristi Martel RN  Outcome: Progressing     Problem: Musculoskeletal - Adult  Goal: Return mobility to safest level of function  5/31/2024 1550 by Kristi Martel RN  Outcome: Progressing     Problem: Musculoskeletal - Adult  Goal: Return ADL status to a safe level of function  5/31/2024 0435 by Alexandra Borrero RN  Outcome: Progressing     Problem: Genitourinary - Adult  Goal: Absence of urinary retention  5/31/2024 1550 by Kristi Martel RN  Outcome: Progressing     Problem: Metabolic/Fluid and Electrolytes - Adult  Goal: Electrolytes maintained within normal limits  5/31/2024 1550 by Kristi Martel RN  Outcome: Progressing     Problem: Metabolic/Fluid and Electrolytes - Adult  Goal: Glucose maintained within prescribed range  5/31/2024 1550 by Kristi Martel RN  Outcome: Progressing

## 2024-05-31 NOTE — PROGRESS NOTES
0825 50 mcg Diprivan paused for sedation vacation    0835 Diprivan restarted at half the rate per order at 25mcg and 50mcg of fentanyl given. Patient respiratory rate increased to 35-40 breathes per minute, heart rate increased to 120 bpm, O2 sat decreased to 90% and patient extremely restless. She was not able to follow commands but moves all extremities to painful stimuli as well as non purposeful movements. Will titrate diprivan as needed per the RASS scale. Fentanyl given with CPOT assessment tool.

## 2024-05-31 NOTE — PLAN OF CARE
Problem: Safety - Medical Restraint  Goal: Remains free of injury from restraints (Restraint for Interference with Medical Device)  Description: INTERVENTIONS:  1. Determine that other, less restrictive measures have been tried or would not be effective before applying the restraint  2. Evaluate the patient's condition at the time of restraint application  3. Inform patient/family regarding the reason for restraint  4. Q2H: Monitor safety, psychosocial status, comfort, nutrition and hydration  5/31/2024 0435 by Alexandra Borrero RN  Outcome: Progressing  5/30/2024 1932 by Belen Huffman RN  Outcome: Progressing  Flowsheets (Taken 5/30/2024 0927)  Remains free of injury from restraints (restraint for interference with medical device):   Determine that other, less restrictive measures have been tried or would not be effective before applying the restraint   Evaluate the patient's condition at the time of restraint application   Inform patient/family regarding the reason for restraint   Every 2 hours: Monitor safety, psychosocial status, comfort, nutrition and hydration     Problem: Safety - Adult  Goal: Free from fall injury  5/31/2024 0435 by Alexandra Borrero RN  Outcome: Progressing  5/30/2024 1932 by Belen Huffman RN  Outcome: Progressing  Flowsheets (Taken 5/30/2024 0800)  Free From Fall Injury:   Instruct family/caregiver on patient safety   Based on caregiver fall risk screen, instruct family/caregiver to ask for assistance with transferring infant if caregiver noted to have fall risk factors     Problem: Discharge Planning  Goal: Discharge to home or other facility with appropriate resources  5/31/2024 0435 by Alexandra Borrero RN  Outcome: Progressing  5/30/2024 1932 by Belen Huffman RN  Outcome: Progressing     Problem: Pain  Goal: Verbalizes/displays adequate comfort level or baseline comfort level  5/31/2024 0435 by Alexandra Borrero RN  Outcome: Progressing  5/30/2024 1932 by

## 2024-05-31 NOTE — CARE COORDINATION
ONGOING DISCHARGE PLAN:    Patient is intubated.  VENT 30%    [facility] is following. Spouse wants [facility].    +Haemophilus Influenza  5/26 +BC x2, repeat BC 5/28    POD #2 LP  +CSF (meningitis)    IV rocephin 2G every 12 hours per ID    GI consult-GI panel negative  oral vanco-empirically  Hgb 8.3    Cardene drip    PT/OT    Will continue to follow for additional discharge needs.    If patient is discharged prior to next notation, then this note serves as note for discharge by case management.    Electronically signed by Daksha Saha RN on 5/31/2024 at 3:23 PM

## 2024-05-31 NOTE — PROGRESS NOTES
ICU Progress Note (Vent)   [facility]    Patient - Kavya De Santiago,  Age - 64 y.o.    - 1960      Room Number -    MRN -  [id number]   [facility] # - [id number]  Date of Admission -  2024  8:51 AM    Events of Past 24 Hours     Sedation vacation this morning resulted in heart rate going into the 130s, respiratory rate into the 30s, patient thrashing and not following any commands    Resedated.  Weaning trial not attempted due to high heart rate and tachypnea    Yesterday evening, patient's blood pressure continued to be elevated so I did be started on a Cardene drip still on Cardene now.  However this is being weaned off    Vitals    height is 1.575 m (5' 2\") and weight is 105 kg (231 lb 7.7 oz). Her temperature is 98.8 °F (37.1 °C). Her blood pressure is 154/65 (abnormal) and her pulse is 111 (abnormal). Her respiration is 22 and oxygen saturation is 95%.       Temperature Range: Temp: 98.8 °F (37.1 °C) Temp  Av.8 °F (37.7 °C)  Min: 98.4 °F (36.9 °C)  Max: 100.9 °F (38.3 °C)  BP Range:  Systolic (24hrs), Av , Min:103 , Max:188     Diastolic (24hrs), Av, Min:51, Max:131    Pulse Range: Pulse  Av.9  Min: 64  Max: 111  Respiration Range: Resp  Av  Min: 14  Max: 29  Current Pulse Ox::  SpO2: 95 %  24HR Pulse Ox Range:  SpO2  Av %  Min: 92 %  Max: 98 %  Oxygen Amount and Delivery:        Wt Readings from Last 3 Encounters:   24 105 kg (231 lb 7.7 oz)   24 91.4 kg (201 lb 9.6 oz)   10/03/23 90.1 kg (198 lb 9.6 oz)     I/O     Intake/Output Summary (Last 24 hours) at 2024 0855  Last data filed at 2024 0554  Gross per 24 hour   Intake 4076.83 ml   Output 4250 ml   Net -173.17 ml       I/O last 3 completed shifts:  In: 6647.6 [I.V.:3265; NG/GT:2324; IV Piggyback:1058.6]  Out: 6800 [Urine:6350; Stool:450]     DRAIN/TUBE OUTPUT:     Invasive Lines   ETT Day -   6   Lines -right internal jugular central  AM    AST 72 05/29/2024 04:46 AM    PROT 6.7 05/19/2012 12:05 PM    BILITOT 0.2 05/29/2024 04:46 AM    BILIDIR 0.1 05/29/2024 04:46 AM    IBILI 0.1 05/29/2024 04:46 AM       INR  No results for input(s): \"PROTIME\", \"INR\" in the last 72 hours.      APTT  No results for input(s): \"APTT\" in the last 72 hours.      Lactic Acid  Lab Results   Component Value Date/Time    LACTA 1.1 05/27/2024 03:37 PM    LACTA 1.7 05/26/2024 09:09 PM    LACTA 3.4 05/26/2024 06:36 PM        BNP   No results for input(s): \"BNP\" in the last 72 hours.     Cultures       Radiology     Plain Films         Chest x-ray difficult to interpret today due to motion artifact but tubes and lines appear to be in good position      SYSTEM ASSESSMENT    Acute hypoxic respiratory failure, intubated 5/26  Sepsis-initially in shock with H influenza bacteremia-CSF fluid positive by molecular panel for H influenza  Lactic acidosis-resolved  Type 2 diabetes  Questionable colitis seen on CT scan abdomen/pelvis  Echo 5/28 shows normal ejection fraction but RVSP of 52  Type 2 diabetes  Chronic back pain had been on opiates and gabapentin in the past  History of hypertension  KVNG-clinical diagnosis no sleep study has been done never followed up with us in the office  Restless leg syndrome-improved with increased ropinirole  Nonmorbid obesity  COVID/influenza swab negative  Full CODE STATUS      Neuro     Currently on sedation vacation but does not follow commands but moves all extremities  LP findings consistent with meningitis and molecular panel shows H. Influenzae  Her legs are kicking as much since her Requip was increased to twice a day    Respiratory   Wean oxygen as tolerated. Keep O2 sat > 88%  Unable to wean today due to tachypnea off sedation  Would be careful with extubation since I do not think she can protect her airway and has KVNG clinically       Hemodynamics     Had to be started on nicardipine drip yesterday; now being weaned off blood pressure

## 2024-05-31 NOTE — PROGRESS NOTES
GI Progress notes    5/31/2024   8:28 AM    Name:  Kavya De Santiago  MRN:    [id number]     Acct:     [id number]   Room:  2003/2003-01  IP Day: 5     Admit Date: 5/26/2024  8:51 AM  PCP: Shaina Hamilton MD    Subjective:     C/C:   Chief Complaint   Patient presents with    Loss of Consciousness       Interval History: Status: not changed.     Patient seen and examined.  No acute events overnight.  GI panel negative  It appears C. Diff was not run.    ROS:  Unable to assess:  intubated, sedated    Medications:     Allergies:   Allergies   Allergen Reactions    Codeine Nausea Only       Current Meds: potassium chloride 10 mEq/100 mL IVPB (Peripheral Line), Q1H  magnesium sulfate 2000 mg in water 50 mL IVPB, Once  insulin glargine (LANTUS) injection vial 20 Units, Daily  metoprolol tartrate (LOPRESSOR) tablet 75 mg, BID  miconazole (MICOTIN) 2 % powder, BID  enoxaparin Sodium (LOVENOX) injection 30 mg, BID  vancomycin (FIRVANQ) 50 MG/ML oral solution 125 mg, 4 times per day  lisinopril (PRINIVIL;ZESTRIL) tablet 20 mg, Daily  rOPINIRole (REQUIP) tablet 2 mg, BID  pantoprazole (PROTONIX) 40 mg in sodium chloride (PF) 0.9 % 10 mL injection, Daily  niCARdipine (CARDENE) 25 mg in sodium chloride 0.9 % 250 mL infusion, Continuous  fentaNYL (SUBLIMAZE) injection 50 mcg, Q30 Min PRN  insulin lispro (HUMALOG,ADMELOG) injection vial 0-8 Units, Q4H  0.9 % sodium chloride infusion, Continuous  cefTRIAXone (ROCEPHIN) 2,000 mg in sodium chloride 0.9 % 50 mL IVPB (mini-bag), Q12H  propofol infusion, Continuous  sodium chloride flush 0.9 % injection 10 mL, PRN  aspirin EC tablet 81 mg, Daily  atorvastatin (LIPITOR) tablet 40 mg, Nightly  latanoprost (XALATAN) 0.005 % ophthalmic solution 1 drop, Nightly  busPIRone (BUSPAR) tablet 10 mg, TID  [Held by provider] venlafaxine (EFFEXOR XR) extended release capsule 75 mg, Daily  sodium chloride flush 0.9 % injection 5-40 mL, 2 times per day  sodium chloride flush 0.9 % injection 5-40     WBC 7.9 05/31/2024 04:05 AM    RBC 2.96 05/31/2024 04:05 AM    HGB 8.3 05/31/2024 04:05 AM    HCT 25.3 05/31/2024 04:05 AM    MCV 85.4 05/31/2024 04:05 AM    MCH 27.9 05/31/2024 04:05 AM    MCHC 32.6 05/31/2024 04:05 AM    RDW 13.8 05/31/2024 04:05 AM     05/31/2024 04:05 AM    MPV 7.6 05/31/2024 04:05 AM     CBC with Differential:    Lab Results   Component Value Date/Time    WBC 7.9 05/31/2024 04:05 AM    RBC 2.96 05/31/2024 04:05 AM    HGB 8.3 05/31/2024 04:05 AM    HCT 25.3 05/31/2024 04:05 AM     05/31/2024 04:05 AM    MCV 85.4 05/31/2024 04:05 AM    MCH 27.9 05/31/2024 04:05 AM    MCHC 32.6 05/31/2024 04:05 AM    RDW 13.8 05/31/2024 04:05 AM    LYMPHOPCT 15 05/31/2024 04:05 AM    MONOPCT 11 05/31/2024 04:05 AM    EOSPCT 7 05/31/2024 04:05 AM    BASOPCT 1 05/31/2024 04:05 AM    MONOSABS 0.80 05/31/2024 04:05 AM    LYMPHSABS 2.22 04/11/2023 10:48 AM    EOSABS 0.50 05/31/2024 04:05 AM    BASOSABS 0.00 05/31/2024 04:05 AM    DIFFTYPE NOT REPORTED 07/21/2021 10:15 AM     Hemoglobin/Hematocrit:    Lab Results   Component Value Date/Time    HGB 8.3 05/31/2024 04:05 AM    HCT 25.3 05/31/2024 04:05 AM     CMP:    Lab Results   Component Value Date/Time     05/31/2024 04:05 AM    K 3.3 05/31/2024 04:05 AM     05/31/2024 04:05 AM    CO2 26 05/31/2024 04:05 AM    BUN 8 05/31/2024 04:05 AM    CREATININE 0.6 05/31/2024 04:05 AM    GFRAA >60 07/06/2022 03:40 PM    LABGLOM >90 05/31/2024 04:05 AM    LABGLOM >60 11/14/2023 10:17 AM    GLUCOSE 277 05/31/2024 04:05 AM    GLUCOSE 137 05/19/2012 12:05 PM    PROT 6.7 05/19/2012 12:05 PM    CALCIUM 8.1 05/31/2024 04:05 AM    BILITOT 0.2 05/29/2024 04:46 AM    ALKPHOS 119 05/29/2024 04:46 AM    AST 72 05/29/2024 04:46 AM    ALT 46 05/29/2024 04:46 AM     BMP:    Lab Results   Component Value Date/Time     05/31/2024 04:05 AM    K 3.3 05/31/2024 04:05 AM     05/31/2024 04:05 AM    CO2 26 05/31/2024 04:05 AM    BUN 8 05/31/2024 04:05 AM

## 2024-05-31 NOTE — PROGRESS NOTES
Occupational Therapy  [facility]   OCCUPATIONAL THERAPY MISSED TREATMENT NOTE   INPATIENT   Date: 24  Patient Name: Kavya De Santiago       Room:   MRN: [id number]   Account #: [id number]    : 1960  (64 y.o.)  Gender: female            REASON FOR MISSED TREATMENT:  other    -   Patient remains sedated on vent per chart review. OT will continue to follow for needs and will eval when appropriate.        Electronically signed by YAAKOV Foster/L on 24 at 10:51 AM EDT

## 2024-06-01 ENCOUNTER — APPOINTMENT (OUTPATIENT)
Dept: CT IMAGING | Age: 64
End: 2024-06-01
Payer: COMMERCIAL

## 2024-06-01 ENCOUNTER — APPOINTMENT (OUTPATIENT)
Dept: GENERAL RADIOLOGY | Age: 64
End: 2024-06-01
Payer: COMMERCIAL

## 2024-06-01 PROBLEM — G00.0: Status: ACTIVE | Noted: 2024-06-01

## 2024-06-01 PROBLEM — Z86.73 HISTORY OF TIA (TRANSIENT ISCHEMIC ATTACK): Status: ACTIVE | Noted: 2024-06-01

## 2024-06-01 PROBLEM — R78.81 BACTEREMIA: Status: ACTIVE | Noted: 2024-06-01

## 2024-06-01 PROBLEM — R65.20: Status: ACTIVE | Noted: 2024-06-01

## 2024-06-01 PROBLEM — R79.89 ELEVATED LFTS: Status: ACTIVE | Noted: 2024-06-01

## 2024-06-01 PROBLEM — D64.9 ANEMIA: Status: ACTIVE | Noted: 2024-06-01

## 2024-06-01 PROBLEM — R41.89 UNRESPONSIVE STATE: Status: ACTIVE | Noted: 2024-06-01

## 2024-06-01 PROBLEM — A41.3: Status: ACTIVE | Noted: 2024-06-01

## 2024-06-01 PROBLEM — G04.90 MENINGOENCEPHALITIS: Status: ACTIVE | Noted: 2024-06-01

## 2024-06-01 PROBLEM — J96.00: Status: ACTIVE | Noted: 2024-06-01

## 2024-06-01 LAB
ALBUMIN SERPL-MCNC: 2.7 G/DL (ref 3.5–5.2)
ALP SERPL-CCNC: 223 U/L (ref 35–104)
ALT SERPL-CCNC: 81 U/L (ref 5–33)
ANION GAP SERPL CALCULATED.3IONS-SCNC: 8 MMOL/L (ref 9–17)
ARTERIAL PATENCY WRIST A: ABNORMAL
AST SERPL-CCNC: 42 U/L
BACTERIA URNS QL MICRO: ABNORMAL
BASOPHILS # BLD: 0.09 K/UL (ref 0–0.2)
BASOPHILS NFR BLD: 1 % (ref 0–2)
BDY SITE: ABNORMAL
BILIRUB DIRECT SERPL-MCNC: 0.1 MG/DL
BILIRUB INDIRECT SERPL-MCNC: 0.1 MG/DL (ref 0–1)
BILIRUB SERPL-MCNC: 0.2 MG/DL (ref 0.3–1.2)
BILIRUB UR QL STRIP: NEGATIVE
BODY TEMPERATURE: 37
BUN SERPL-MCNC: 9 MG/DL (ref 8–23)
CALCIUM SERPL-MCNC: 8.4 MG/DL (ref 8.6–10.4)
CASTS #/AREA URNS LPF: ABNORMAL /LPF
CHLORIDE SERPL-SCNC: 105 MMOL/L (ref 98–107)
CK SERPL-CCNC: 179 U/L (ref 26–192)
CLARITY UR: CLEAR
CO2 SERPL-SCNC: 29 MMOL/L (ref 20–31)
COHGB MFR BLD: 1.3 % (ref 0–5)
COLOR UR: YELLOW
CREAT SERPL-MCNC: 0.6 MG/DL (ref 0.5–0.9)
EOSINOPHIL # BLD: 0.62 K/UL (ref 0–0.4)
EOSINOPHILS RELATIVE PERCENT: 7 % (ref 0–4)
EPI CELLS #/AREA URNS HPF: ABNORMAL /HPF
ERYTHROCYTE [DISTWIDTH] IN BLOOD BY AUTOMATED COUNT: 13.9 % (ref 11.5–14.9)
FERRITIN SERPL-MCNC: 280 NG/ML (ref 13–150)
FIO2 ON VENT: 30 %
FOLATE SERPL-MCNC: 11.1 NG/ML (ref 4.8–24.2)
GAS FLOW.O2 O2 DELIVERY SYS: ABNORMAL L/MIN
GAS FLOW.O2 SETTING OXYMISER: 20 L/MIN
GFR, ESTIMATED: >90 ML/MIN/1.73M2
GLUCOSE BLD-MCNC: 174 MG/DL (ref 65–105)
GLUCOSE BLD-MCNC: 208 MG/DL (ref 65–105)
GLUCOSE BLD-MCNC: 228 MG/DL (ref 65–105)
GLUCOSE BLD-MCNC: 230 MG/DL (ref 65–105)
GLUCOSE BLD-MCNC: 253 MG/DL (ref 65–105)
GLUCOSE BLD-MCNC: 257 MG/DL (ref 65–105)
GLUCOSE SERPL-MCNC: 259 MG/DL (ref 70–99)
GLUCOSE UR STRIP-MCNC: ABNORMAL MG/DL
HAV IGM SERPL QL IA: NONREACTIVE
HBV CORE IGM SERPL QL IA: NONREACTIVE
HBV SURFACE AG SERPL QL IA: NONREACTIVE
HCO3 ARTERIAL: 31.8 MMOL/L (ref 22–26)
HCT VFR BLD AUTO: 26.8 % (ref 36–46)
HCV AB SERPL QL IA: NONREACTIVE
HGB BLD-MCNC: 8.5 G/DL (ref 12–16)
HGB UR QL STRIP.AUTO: ABNORMAL
IRON SATN MFR SERPL: 21 % (ref 20–55)
IRON SERPL-MCNC: 45 UG/DL (ref 37–145)
KETONES UR STRIP-MCNC: NEGATIVE MG/DL
LEUKOCYTE ESTERASE UR QL STRIP: NEGATIVE
LYMPHOCYTES NFR BLD: 1.32 K/UL (ref 1–4.8)
LYMPHOCYTES RELATIVE PERCENT: 15 % (ref 24–44)
MAGNESIUM SERPL-MCNC: 1.6 MG/DL (ref 1.6–2.6)
MCH RBC QN AUTO: 27.3 PG (ref 26–34)
MCHC RBC AUTO-ENTMCNC: 31.7 G/DL (ref 31–37)
MCV RBC AUTO: 86.1 FL (ref 80–100)
METHEMOGLOBIN: 0.4 % (ref 0–1.9)
MICROORGANISM SPEC CULT: NORMAL
MICROORGANISM/AGENT SPEC: NORMAL
MONOCYTES NFR BLD: 1.06 K/UL (ref 0.1–1.3)
MONOCYTES NFR BLD: 12 % (ref 1–7)
MORPHOLOGY: ABNORMAL
MORPHOLOGY: ABNORMAL
MYOGLOBIN SERPL-MCNC: 126 NG/ML (ref 25–58)
NEUTROPHILS NFR BLD: 65 % (ref 36–66)
NEUTS SEG NFR BLD: 5.71 K/UL (ref 1.3–9.1)
NITRITE UR QL STRIP: NEGATIVE
O2 SAT, ARTERIAL: 93.8 % (ref 95–98)
PCO2 ARTERIAL: 39.6 MMHG (ref 35–45)
PEEP RESPIRATORY: 7 CM[H2O]
PH ARTERIAL: 7.51 (ref 7.35–7.45)
PH UR STRIP: 7 [PH] (ref 5–8)
PLATELET # BLD AUTO: 282 K/UL (ref 150–450)
PMV BLD AUTO: 7.6 FL (ref 6–12)
PO2 ARTERIAL: 70.2 MMHG (ref 80–100)
POSITIVE BASE EXCESS, ART: 8.9 MMOL/L (ref 0–2)
POTASSIUM SERPL-SCNC: 3.6 MMOL/L (ref 3.7–5.3)
PROT SERPL-MCNC: 5.6 G/DL (ref 6.4–8.3)
PROT UR STRIP-MCNC: NEGATIVE MG/DL
PT. POSITION: ABNORMAL
RBC # BLD AUTO: 3.11 M/UL (ref 4–5.2)
RBC #/AREA URNS HPF: ABNORMAL /HPF
RESPIRATORY RATE: 20
SODIUM SERPL-SCNC: 142 MMOL/L (ref 135–144)
SP GR UR STRIP: 1.01 (ref 1–1.03)
SPECIMEN DESCRIPTION: NORMAL
TEXT FOR RESPIRATORY: ABNORMAL
TIBC SERPL-MCNC: 215 UG/DL (ref 250–450)
TOTAL RATE: 20
TRIGL SERPL-MCNC: 412 MG/DL
UNSATURATED IRON BINDING CAPACITY: 170 UG/DL (ref 112–347)
UROBILINOGEN UR STRIP-ACNC: NORMAL EU/DL (ref 0–1)
VENTILATION MODE VENT: ABNORMAL
VIT B12 SERPL-MCNC: 1159 PG/ML (ref 232–1245)
VT: 500
WBC #/AREA URNS HPF: ABNORMAL /HPF
WBC OTHER # BLD: 8.8 K/UL (ref 3.5–11)

## 2024-06-01 PROCEDURE — 2580000003 HC RX 258: Performed by: INTERNAL MEDICINE

## 2024-06-01 PROCEDURE — APPSS30 APP SPLIT SHARED TIME 16-30 MINUTES: Performed by: NURSE PRACTITIONER

## 2024-06-01 PROCEDURE — 6370000000 HC RX 637 (ALT 250 FOR IP): Performed by: INTERNAL MEDICINE

## 2024-06-01 PROCEDURE — 70460 CT HEAD/BRAIN W/DYE: CPT

## 2024-06-01 PROCEDURE — 82746 ASSAY OF FOLIC ACID SERUM: CPT

## 2024-06-01 PROCEDURE — 99231 SBSQ HOSP IP/OBS SF/LOW 25: CPT | Performed by: INTERNAL MEDICINE

## 2024-06-01 PROCEDURE — 6360000002 HC RX W HCPCS: Performed by: INTERNAL MEDICINE

## 2024-06-01 PROCEDURE — 2500000003 HC RX 250 WO HCPCS: Performed by: INTERNAL MEDICINE

## 2024-06-01 PROCEDURE — C9113 INJ PANTOPRAZOLE SODIUM, VIA: HCPCS | Performed by: INTERNAL MEDICINE

## 2024-06-01 PROCEDURE — 99232 SBSQ HOSP IP/OBS MODERATE 35: CPT | Performed by: NURSE PRACTITIONER

## 2024-06-01 PROCEDURE — 51702 INSERT TEMP BLADDER CATH: CPT

## 2024-06-01 PROCEDURE — 6370000000 HC RX 637 (ALT 250 FOR IP)

## 2024-06-01 PROCEDURE — 99291 CRITICAL CARE FIRST HOUR: CPT | Performed by: INTERNAL MEDICINE

## 2024-06-01 PROCEDURE — 2500000003 HC RX 250 WO HCPCS

## 2024-06-01 PROCEDURE — 36600 WITHDRAWAL OF ARTERIAL BLOOD: CPT

## 2024-06-01 PROCEDURE — 99223 1ST HOSP IP/OBS HIGH 75: CPT | Performed by: PSYCHIATRY & NEUROLOGY

## 2024-06-01 PROCEDURE — 6360000002 HC RX W HCPCS

## 2024-06-01 PROCEDURE — 83735 ASSAY OF MAGNESIUM: CPT

## 2024-06-01 PROCEDURE — 83874 ASSAY OF MYOGLOBIN: CPT

## 2024-06-01 PROCEDURE — 71045 X-RAY EXAM CHEST 1 VIEW: CPT

## 2024-06-01 PROCEDURE — 84478 ASSAY OF TRIGLYCERIDES: CPT

## 2024-06-01 PROCEDURE — 2580000003 HC RX 258: Performed by: PSYCHIATRY & NEUROLOGY

## 2024-06-01 PROCEDURE — 82947 ASSAY GLUCOSE BLOOD QUANT: CPT

## 2024-06-01 PROCEDURE — 6360000004 HC RX CONTRAST MEDICATION: Performed by: PSYCHIATRY & NEUROLOGY

## 2024-06-01 PROCEDURE — 36415 COLL VENOUS BLD VENIPUNCTURE: CPT

## 2024-06-01 PROCEDURE — 80076 HEPATIC FUNCTION PANEL: CPT

## 2024-06-01 PROCEDURE — 82805 BLOOD GASES W/O2 SATURATION: CPT

## 2024-06-01 PROCEDURE — 81001 URINALYSIS AUTO W/SCOPE: CPT

## 2024-06-01 PROCEDURE — 82607 VITAMIN B-12: CPT

## 2024-06-01 PROCEDURE — 83540 ASSAY OF IRON: CPT

## 2024-06-01 PROCEDURE — 94003 VENT MGMT INPAT SUBQ DAY: CPT

## 2024-06-01 PROCEDURE — A4216 STERILE WATER/SALINE, 10 ML: HCPCS | Performed by: INTERNAL MEDICINE

## 2024-06-01 PROCEDURE — 2580000003 HC RX 258

## 2024-06-01 PROCEDURE — 2700000000 HC OXYGEN THERAPY PER DAY

## 2024-06-01 PROCEDURE — 2000000000 HC ICU R&B

## 2024-06-01 PROCEDURE — 82550 ASSAY OF CK (CPK): CPT

## 2024-06-01 PROCEDURE — 94761 N-INVAS EAR/PLS OXIMETRY MLT: CPT

## 2024-06-01 PROCEDURE — 85025 COMPLETE CBC W/AUTO DIFF WBC: CPT

## 2024-06-01 PROCEDURE — 80074 ACUTE HEPATITIS PANEL: CPT

## 2024-06-01 PROCEDURE — 83550 IRON BINDING TEST: CPT

## 2024-06-01 PROCEDURE — 82728 ASSAY OF FERRITIN: CPT

## 2024-06-01 PROCEDURE — 51701 INSERT BLADDER CATHETER: CPT

## 2024-06-01 PROCEDURE — 70498 CT ANGIOGRAPHY NECK: CPT

## 2024-06-01 PROCEDURE — 51798 US URINE CAPACITY MEASURE: CPT

## 2024-06-01 PROCEDURE — 6360000002 HC RX W HCPCS: Performed by: PSYCHIATRY & NEUROLOGY

## 2024-06-01 PROCEDURE — 80048 BASIC METABOLIC PNL TOTAL CA: CPT

## 2024-06-01 RX ORDER — 0.9 % SODIUM CHLORIDE 0.9 %
100 INTRAVENOUS SOLUTION INTRAVENOUS ONCE
Status: COMPLETED | OUTPATIENT
Start: 2024-06-01 | End: 2024-06-01

## 2024-06-01 RX ORDER — DEXMEDETOMIDINE HYDROCHLORIDE 4 UG/ML
.1-1.5 INJECTION, SOLUTION INTRAVENOUS CONTINUOUS
Status: DISCONTINUED | OUTPATIENT
Start: 2024-06-01 | End: 2024-06-04

## 2024-06-01 RX ORDER — FENTANYL CITRATE 0.05 MG/ML
50 INJECTION, SOLUTION INTRAMUSCULAR; INTRAVENOUS ONCE
Status: COMPLETED | OUTPATIENT
Start: 2024-06-01 | End: 2024-06-01

## 2024-06-01 RX ORDER — BUSPIRONE HYDROCHLORIDE 10 MG/1
10 TABLET ORAL 3 TIMES DAILY
Status: DISCONTINUED | OUTPATIENT
Start: 2024-06-01 | End: 2024-06-14 | Stop reason: HOSPADM

## 2024-06-01 RX ORDER — SODIUM CHLORIDE 0.9 % (FLUSH) 0.9 %
10 SYRINGE (ML) INJECTION PRN
Status: DISCONTINUED | OUTPATIENT
Start: 2024-06-01 | End: 2024-06-14 | Stop reason: SDUPTHER

## 2024-06-01 RX ORDER — FENTANYL CITRATE-0.9 % NACL/PF 10 MCG/ML
25-200 PLASTIC BAG, INJECTION (ML) INTRAVENOUS CONTINUOUS
Status: DISCONTINUED | OUTPATIENT
Start: 2024-06-01 | End: 2024-06-03

## 2024-06-01 RX ADMIN — PROPOFOL 50 MCG/KG/MIN: 10 INJECTION, EMULSION INTRAVENOUS at 01:30

## 2024-06-01 RX ADMIN — VANCOMYCIN HYDROCHLORIDE 125 MG: KIT at 21:03

## 2024-06-01 RX ADMIN — METOPROLOL TARTRATE 75 MG: 50 TABLET, FILM COATED ORAL at 08:45

## 2024-06-01 RX ADMIN — DEXMEDETOMIDINE HYDROCHLORIDE 0.2 MCG/KG/HR: 400 INJECTION INTRAVENOUS at 15:00

## 2024-06-01 RX ADMIN — ROPINIROLE HYDROCHLORIDE 2 MG: 2 TABLET, FILM COATED ORAL at 20:52

## 2024-06-01 RX ADMIN — PROPOFOL 50 MCG/KG/MIN: 10 INJECTION, EMULSION INTRAVENOUS at 04:39

## 2024-06-01 RX ADMIN — INSULIN LISPRO 2 UNITS: 100 INJECTION, SOLUTION INTRAVENOUS; SUBCUTANEOUS at 16:34

## 2024-06-01 RX ADMIN — ATORVASTATIN CALCIUM 40 MG: 40 TABLET, FILM COATED ORAL at 20:53

## 2024-06-01 RX ADMIN — IOPAMIDOL 75 ML: 755 INJECTION, SOLUTION INTRAVENOUS at 19:58

## 2024-06-01 RX ADMIN — DEXMEDETOMIDINE HYDROCHLORIDE 1.2 MCG/KG/HR: 400 INJECTION INTRAVENOUS at 20:44

## 2024-06-01 RX ADMIN — METOPROLOL TARTRATE 75 MG: 50 TABLET, FILM COATED ORAL at 20:52

## 2024-06-01 RX ADMIN — PROPOFOL 50 MCG/KG/MIN: 10 INJECTION, EMULSION INTRAVENOUS at 12:25

## 2024-06-01 RX ADMIN — ENOXAPARIN SODIUM 30 MG: 100 INJECTION SUBCUTANEOUS at 08:44

## 2024-06-01 RX ADMIN — SODIUM CHLORIDE, PRESERVATIVE FREE 10 ML: 5 INJECTION INTRAVENOUS at 12:12

## 2024-06-01 RX ADMIN — INSULIN LISPRO 4 UNITS: 100 INJECTION, SOLUTION INTRAVENOUS; SUBCUTANEOUS at 12:09

## 2024-06-01 RX ADMIN — INSULIN LISPRO 2 UNITS: 100 INJECTION, SOLUTION INTRAVENOUS; SUBCUTANEOUS at 20:52

## 2024-06-01 RX ADMIN — ACETAMINOPHEN 650 MG: 325 TABLET ORAL at 15:02

## 2024-06-01 RX ADMIN — SODIUM CHLORIDE 2.5 MG/HR: 9 INJECTION, SOLUTION INTRAVENOUS at 05:07

## 2024-06-01 RX ADMIN — LATANOPROST 1 DROP: 50 SOLUTION OPHTHALMIC at 21:14

## 2024-06-01 RX ADMIN — CEFTRIAXONE SODIUM 2000 MG: 2 INJECTION, POWDER, FOR SOLUTION INTRAMUSCULAR; INTRAVENOUS at 16:53

## 2024-06-01 RX ADMIN — PROPOFOL 50 MCG/KG/MIN: 10 INJECTION, EMULSION INTRAVENOUS at 08:37

## 2024-06-01 RX ADMIN — SODIUM CHLORIDE: 9 INJECTION, SOLUTION INTRAVENOUS at 05:12

## 2024-06-01 RX ADMIN — ROPINIROLE HYDROCHLORIDE 2 MG: 2 TABLET, FILM COATED ORAL at 08:45

## 2024-06-01 RX ADMIN — Medication 175 MCG/HR: at 20:16

## 2024-06-01 RX ADMIN — PANTOPRAZOLE SODIUM 40 MG: 40 INJECTION, POWDER, FOR SOLUTION INTRAVENOUS at 08:44

## 2024-06-01 RX ADMIN — BUSPIRONE HYDROCHLORIDE 10 MG: 10 TABLET ORAL at 08:45

## 2024-06-01 RX ADMIN — LISINOPRIL 30 MG: 20 TABLET ORAL at 08:45

## 2024-06-01 RX ADMIN — ENOXAPARIN SODIUM 30 MG: 100 INJECTION SUBCUTANEOUS at 20:52

## 2024-06-01 RX ADMIN — INSULIN GLARGINE 20 UNITS: 100 INJECTION, SOLUTION SUBCUTANEOUS at 08:43

## 2024-06-01 RX ADMIN — INSULIN LISPRO 4 UNITS: 100 INJECTION, SOLUTION INTRAVENOUS; SUBCUTANEOUS at 08:43

## 2024-06-01 RX ADMIN — FENTANYL CITRATE 50 MCG: 0.05 INJECTION, SOLUTION INTRAMUSCULAR; INTRAVENOUS at 04:35

## 2024-06-01 RX ADMIN — VANCOMYCIN HYDROCHLORIDE 125 MG: KIT at 08:48

## 2024-06-01 RX ADMIN — INSULIN LISPRO 2 UNITS: 100 INJECTION, SOLUTION INTRAVENOUS; SUBCUTANEOUS at 00:19

## 2024-06-01 RX ADMIN — LABETALOL HYDROCHLORIDE 10 MG: 5 INJECTION, SOLUTION INTRAVENOUS at 00:19

## 2024-06-01 RX ADMIN — FENTANYL CITRATE 50 MCG: 0.05 INJECTION, SOLUTION INTRAMUSCULAR; INTRAVENOUS at 15:12

## 2024-06-01 RX ADMIN — Medication 50 MCG/HR: at 15:36

## 2024-06-01 RX ADMIN — ANTI-FUNGAL POWDER MICONAZOLE NITRATE TALC FREE: 1.42 POWDER TOPICAL at 21:16

## 2024-06-01 RX ADMIN — VANCOMYCIN HYDROCHLORIDE 125 MG: KIT at 16:34

## 2024-06-01 RX ADMIN — SODIUM CHLORIDE, PRESERVATIVE FREE 10 ML: 5 INJECTION INTRAVENOUS at 19:58

## 2024-06-01 RX ADMIN — VANCOMYCIN HYDROCHLORIDE 125 MG: KIT at 03:30

## 2024-06-01 RX ADMIN — FENTANYL CITRATE 50 MCG: 0.05 INJECTION, SOLUTION INTRAMUSCULAR; INTRAVENOUS at 05:43

## 2024-06-01 RX ADMIN — MAGNESIUM SULFATE HEPTAHYDRATE 2000 MG: 40 INJECTION, SOLUTION INTRAVENOUS at 07:01

## 2024-06-01 RX ADMIN — SODIUM CHLORIDE 100 ML: 9 INJECTION, SOLUTION INTRAVENOUS at 19:57

## 2024-06-01 RX ADMIN — SODIUM CHLORIDE 2.5 MG/HR: 9 INJECTION, SOLUTION INTRAVENOUS at 15:24

## 2024-06-01 RX ADMIN — LEVETIRACETAM 1000 MG: 100 INJECTION, SOLUTION INTRAVENOUS at 18:32

## 2024-06-01 RX ADMIN — BUSPIRONE HYDROCHLORIDE 10 MG: 10 TABLET ORAL at 13:57

## 2024-06-01 RX ADMIN — FENTANYL CITRATE 50 MCG: 0.05 INJECTION, SOLUTION INTRAMUSCULAR; INTRAVENOUS at 09:13

## 2024-06-01 RX ADMIN — CEFTRIAXONE SODIUM 2000 MG: 2 INJECTION, POWDER, FOR SOLUTION INTRAMUSCULAR; INTRAVENOUS at 05:00

## 2024-06-01 RX ADMIN — FENTANYL CITRATE 50 MCG: 0.05 INJECTION, SOLUTION INTRAMUSCULAR; INTRAVENOUS at 07:48

## 2024-06-01 RX ADMIN — SODIUM CHLORIDE, PRESERVATIVE FREE 10 ML: 5 INJECTION INTRAVENOUS at 21:03

## 2024-06-01 RX ADMIN — BUSPIRONE HYDROCHLORIDE 10 MG: 10 TABLET ORAL at 20:53

## 2024-06-01 ASSESSMENT — PULMONARY FUNCTION TESTS
PIF_VALUE: 15
PIF_VALUE: 28
PIF_VALUE: 15
PIF_VALUE: 29
PIF_VALUE: 28
PIF_VALUE: 19
PIF_VALUE: 15
PIF_VALUE: 30
PIF_VALUE: 27
PIF_VALUE: 17
PIF_VALUE: 31
PIF_VALUE: 19
PIF_VALUE: 28
PIF_VALUE: 20
PIF_VALUE: 30
PIF_VALUE: 29
PIF_VALUE: 28
PIF_VALUE: 15
PIF_VALUE: 17
PIF_VALUE: 30
PIF_VALUE: 26
PIF_VALUE: 27
PIF_VALUE: 28
PIF_VALUE: 32
PIF_VALUE: 27
PIF_VALUE: 26
PIF_VALUE: 20
PIF_VALUE: 26
PIF_VALUE: 30
PIF_VALUE: 31
PIF_VALUE: 30
PIF_VALUE: 17
PIF_VALUE: 27
PIF_VALUE: 34
PIF_VALUE: 27
PIF_VALUE: 27
PIF_VALUE: 21
PIF_VALUE: 33
PIF_VALUE: 18
PIF_VALUE: 29
PIF_VALUE: 17
PIF_VALUE: 17
PIF_VALUE: 19
PIF_VALUE: 30
PIF_VALUE: 17
PIF_VALUE: 30
PIF_VALUE: 20
PIF_VALUE: 15
PIF_VALUE: 19
PIF_VALUE: 30
PIF_VALUE: 32
PIF_VALUE: 19
PIF_VALUE: 27
PIF_VALUE: 16
PIF_VALUE: 28
PIF_VALUE: 15
PIF_VALUE: 16
PIF_VALUE: 30
PIF_VALUE: 25
PIF_VALUE: 12
PIF_VALUE: 29
PIF_VALUE: 19
PIF_VALUE: 18
PIF_VALUE: 28
PIF_VALUE: 23
PIF_VALUE: 19
PIF_VALUE: 27
PIF_VALUE: 16
PIF_VALUE: 27
PIF_VALUE: 25
PIF_VALUE: 14
PIF_VALUE: 20
PIF_VALUE: 29
PIF_VALUE: 17
PIF_VALUE: 20
PIF_VALUE: 27
PIF_VALUE: 14
PIF_VALUE: 27
PIF_VALUE: 17

## 2024-06-01 ASSESSMENT — PAIN SCALES - GENERAL
PAINLEVEL_OUTOF10: 0
PAINLEVEL_OUTOF10: 5
PAINLEVEL_OUTOF10: 5

## 2024-06-01 NOTE — CONSULTS
NEUROLOGY INPATIENT CONSULT NOTE    6/1/2024         Kavya De Santiago is a  64 y.o. female admitted on 5/26/2024 with  Colitis [K52.9]    Reason for consult: Intubated, not following commands  History is obtained mostly from the medical record and from the caregivers. Chart is reviewed and patient is examined.   Briefly, this is a  64 y.o. female with hx of HTN, DM, DIEGO and CKD was admitted on 5/26/2024 with altered mentation.  Initial history obtained from EMS report and patient's .  EMS report states that they were called to the patient's house for c/o nausea and vomiting and upon evaluation by EMS noted to be with abnormal glucose concerning for DKA.  While transporting the patient to the hospital; she became completely unresponsive with O2 saturations dropping for which patient was placed on supraglottic airway.  She was hypertensive on arrival with blood pressures with systolic around 160 mm Hg.  Patient was also noted to be febrile with Tmax 104 °F.  CT head on admit is negative for acute intracranial abnormalities.  Spinal fluid studies done and does help significantly abnormal with elevated CSF protein and neutrophilic pleocytosis with meningitis panel positive for H influenza for which patient has been on IV ceftriaxone and vancomycin.  Patient remained on vent since admitted.  Weaning trials were done and patient was off of sedation for several hours today.  Patient was not following commands.  Then patient became extremely violent and needed to be resumed back on sedation and presently on fentanyl and Precedex drip.  Caregivers also stated that patient has been on sedation holidays and has not been following commands.        No current facility-administered medications on file prior to encounter.     Current Outpatient Medications on File Prior to Encounter   Medication Sig Dispense Refill    omeprazole (PRILOSEC) 20 MG delayed release capsule take 1 capsule by mouth every morning and at  achievable.    COMPARISON:  None.    HISTORY:  ORDERING SYSTEM PROVIDED HISTORY: ams, vomiting,  TECHNOLOGIST PROVIDED HISTORY:  ams, vomiting,    Decision Support Exception - unselect if not a suspected or confirmed  emergency medical condition->Emergency Medical Condition (MA)  Reason for Exam: Found down, LT pupil more reactive than RT. Nausea,  vomiting.  Intubated in route    FINDINGS:  BRAIN/VENTRICLES: There is no acute intracranial hemorrhage, mass effect or  midline shift.  No abnormal extra-axial fluid collection.  The gray-white  differentiation is maintained without evidence of an acute infarct.  There is  no evidence of hydrocephalus.    ORBITS: The visualized portion of the orbits demonstrate no acute abnormality.    SINUSES: The visualized paranasal sinuses and mastoid air cells demonstrate  no acute abnormality.    SOFT TISSUES/SKULL:  No acute abnormality of the visualized skull or soft  tissues.    Impression  No acute intracranial abnormality.      Chest X-ray: Stable cardiomegaly.  Mild pulmonary vascular congestion.  Low lung volume exam.  Trace bilateral effusions, stable.  Stable mild retrocardiac airspace opacity.    CT head 5/26/2024: No acute intracranial abnormality    CSF 5/29/2024: ,  (86% neutrophils, 11% lymphs).,  Glucose 68, protein 253., meningitis panel +ve for H influenza    MRI brain:     2D echo:              Impression and Plan: Ms. Kavya De Santiago is a 64 y.o. female with   Acute persistent encephalopathy with abnormal CSF indicating which influenza meningitis; encephalitis and resultant subclinical seizures cannot be ruled out; will get MRI brain and EEG stat; will start her on loading dose of Keppra followed by 500 mg twice daily while waiting for those tests.   Vent dependent resp failure sec to above  Hx of chronic pain syndrome and depression on opiates under pain clinic mgt.   Hx of Restless legs syndrome: on ropinrole  Other comorbid conditions

## 2024-06-01 NOTE — PROGRESS NOTES
Writer in room to address vent alarm, Patient restless in bed, feet hang out bed. PRN fentanyl given at this time.

## 2024-06-01 NOTE — FLOWSHEET NOTE
05/31/24 2237   Urine Assessment   Urinary Status Straight cath;Retention   Urinary Incontinence Present   Urine Color Yellow/straw   Urine Appearance Red flecks;Clear   Urine Odor No odor   Bladder Scan Volume (mL) 766 mL   $ Bladder scan $ Yes   Intermittent/Straight Cath (mL) 850 mL   $ Cath urethra straight $ Yes   Post Void Cath Residual (mL) 0       Patient has not voided since leigh was discontinued at 430 pm. Bladder scan completed per PRN orders, Bladder shows 766. Patient straight cath at this time per order.

## 2024-06-01 NOTE — PROGRESS NOTES
eye movements intact, conjunctivae normal  ENT:does not respond to verbal stimuli  Neck: neck supple and  without mass, no thyromegaly or thyroid nodules, no cervical lymphadenopathy   Pulmonary/Chest: clear to auscultation bilaterally- no wheezes, rales or rhonchi, normal air movement, no respiratory distress pt is intubated   Cardiovascular: normal rate, regular rhythm, normal S1 and S2, no murmurs, rubs, clicks or gallops, distal pulses intact, no carotid bruits  Abdomen: soft, obese , non-distended, normal bowel sounds, no masses or organomegaly fms with liquid brown stool   Extremities: no cyanosis, clubbing or edema  Musculoskeletal: normal range of motion, no joint swelling, deformity   Neurologic: sedated on vent but restless, does not open eyes when name called, does not obey simple commands  Lab and Imaging Review     CBC  Recent Labs     05/30/24 0447 05/31/24  0405 06/01/24  0525   WBC 8.1 7.9 8.8   HGB 8.4* 8.3* 8.5*   HCT 26.2* 25.3* 26.8*   MCV 86.0 85.4 86.1    238 282       BMP  Recent Labs     05/30/24  0447 05/30/24  1548 05/31/24  0405 05/31/24  1617 06/01/24  0525     --  143  --  142   K 3.1*   < > 3.3* 3.5* 3.6*   *  --  109*  --  105   CO2 22  --  26  --  29   BUN 8  --  8  --  9   CREATININE 0.6  --  0.6  --  0.6   GLUCOSE 246*  --  277*  --  259*   CALCIUM 7.9*  --  8.1*  --  8.4*   MG 1.7  --  1.5*  --  1.6    < > = values in this interval not displayed.     Ct abd 5/26/24  FINDINGS:  Lower Chest: There is dependent atelectasis in the right and left lung bases.     Organs:  Liver demonstrates no suspicious mass. There is no evidence of  intrahepatic biliary ductal dilatation. The spleen, pancreas and adrenal  glands are unremarkable. The kidneys demonstrate no evidence of  hydronephrosis.     GI/Bowel:  There is no evidence of bowel obstruction.  There is abnormal  bowel wall thickening of the ascending, transverse, descending colon, sigmoid  colon and rectum consistent  with colitis.     Pelvis:  No acute abnormality of the pelvis organs or bladder.     Peritoneum/Retroperitoneum: No evidence of ascites or free air.     Bones/Soft Tissues: There is umbilical hernia containing fat measures 2 cm.     IMPRESSION:  1. Abnormal bowel wall thickening of the ascending, transverse, descending  colon, sigmoid colon and rectum consistent with colitis.  2. Umbilical hernia containing fat measures 2 cm.      ASSESSMENT/plan  Diarrhea, imaging showing pancolitis, stool cultures negative on empiric vanco per ID  Mgt per ID    2.anemia, no overt bleeding  Anemia profile  Trend hh  Ppi    3.elevated lft liver us 2015 showing steatosis, lft elevated since 2022, ct abd this admission shows no dilatation or overt mass  -acute hepatitis panel, ck myoglobin, ebv cmv  sma mitochondrial ab harpreet anti liver kid ab ordered  Recheck lft in am    4.acute hypoxic respiratory failure remains intubated      5.sepsis secondary to haemophilus influenza meningitis bacteremia    6.AMS neuro consult ordered by attending service    This plan was formulated in collaboration with Dr. Nettles  .    Electronically signed by: ORLY Kelly - CNP on 6/1/2024 at 8:34 AM       Attending Physician Statement  I have discussed the care of Kavya De Santiago and I have examined the patient myselft and taken ros and hpi , including pertinent history and exam findings,  with the author of this note . I have reviewed the key elements of all parts of the encounter with the nurse practitioner/resident.  I agree with the assessment, plan and orders as documented by the above health care provider     More than 50% of the time was spent taking care of this patient in addition to the nurse practitioner time.  That also included history taking follow-up physical examination and review of system.     Will not suppress the diarrhea because of the pancolitis and the questionable history of C. Difficile  Continue Vanco   Monitor

## 2024-06-01 NOTE — FLOWSHEET NOTE
06/01/24 0430   Urine Assessment   Urinary Status Straight cath   Urinary Incontinence Present   Urine Color Yellow/straw   Urine Appearance Red flecks;Clear   Urine Odor No odor   Bladder Scan Volume (mL) 450 mL   $ Bladder scan $ Yes   Intermittent/Straight Cath (mL) 500 mL   $ Cath urethra straight $ Yes   Post Void Cath Residual (mL) 0     Patient still has not voided since removal of leigh cath, bladder scan per PRN order 450. Veronica NP notifed of straight cath at 2230 and the need to straight cath now(0430). Order received  to straight cath patient.    Patient straight cath X2, since leigh removal on 5/31/2024 at 1630.

## 2024-06-01 NOTE — PLAN OF CARE
Problem: Safety - Medical Restraint  Goal: Remains free of injury from restraints (Restraint for Interference with Medical Device)  6/1/2024 0615 by Monie Chandra RN  Outcome: Progressing  5/31/2024 1954 by Brooke Boateng RN  Outcome: Progressing  Flowsheets (Taken 5/31/2024 1954)  Remains free of injury from restraints (restraint for interference with medical device):   Determine that other, less restrictive measures have been tried or would not be effective before applying the restraint   Evaluate the patient's condition at the time of restraint application   Inform patient/family regarding the reason for restraint   Every 2 hours: Monitor safety, psychosocial status, comfort, nutrition and hydration     Problem: Safety - Adult  Goal: Free from fall injury  6/1/2024 0615 by Monie Chandra RN  Outcome: Progressing  5/31/2024 1954 by Brooke Boateng RN  Outcome: Progressing  Flowsheets (Taken 5/31/2024 1954)  Free From Fall Injury: Instruct family/caregiver on patient safety     Problem: Discharge Planning  Goal: Discharge to home or other facility with appropriate resources  5/31/2024 1954 by Brooke Boateng RN  Outcome: Progressing  Flowsheets (Taken 5/31/2024 1954)  Discharge to home or other facility with appropriate resources: Refer to discharge planning if patient needs post-hospital services based on physician order or complex needs related to functional status, cognitive ability or social support system     Problem: Pain  Goal: Verbalizes/displays adequate comfort level or baseline comfort level  5/31/2024 1954 by Brooke Boateng RN  Outcome: Progressing  Flowsheets (Taken 5/31/2024 1954)  Verbalizes/displays adequate comfort level or baseline comfort level:   Implement non-pharmacological measures as appropriate and evaluate response   Assess pain using appropriate pain scale     Problem: Cardiovascular - Adult  Goal: Maintains optimal cardiac output and hemodynamic stability  5/31/2024 1954 by

## 2024-06-01 NOTE — PROGRESS NOTES
[facility] PULMONARY,CRITICAL CARE & SLEEP   Murray Burris MD/Rosendo Khan MD  Kavya Bunn ORLY AGACNP-BC, NP-C      Nadja VILLALBA NP-C     Lynn Fritz APRN NP-C      Ayo VILLALBA NP-C                                  Pulmonary Critical Care Progress Note    Patient - Kavya De Santiago   Age - 64 y.o.   - 1960  MRN - [id number]  [id number]  Date of Admission - 2024  8:51 AM    Consulting Service/Physician:       Primary Care Physician: Shaina Hamilton MD    SUBJECTIVE:     Chief Complaint:   Chief Complaint   Patient presents with    Loss of Consciousness   Sepsis  Subjective:     at bedside.  Patient on propofol unresponsive.  I had the nurse turn off the propofol.  She apparently gets very restless when she is off sedation but I want to see for myself.  She is only on 30% FiO2 and is tolerating CPAP at bedside despite the propofol.  On 10 of pressure support her tidal volumes are consistently 400 to 450 mL    She is hemodynamically stable.    Her  told me she was ill for about 1 week prior to coming into the hospital.  She was having a sore throat, ear pain and lethargy.  She finally came in when she collapsed    He believes she has had a couple of sleep studies most recently several years ago.  To his knowledge they were negative.  He does volunteer that she snores.    She does see multiple people for her chronic pain syndrome and depression.  She sees pain management, neurology, neurosurgery and her primary care.  She is on multiple psychiatric related medications.   denies alcoholism.    She was very restless while I was at bedside.  I could not really get her to open her eyes fully or to follow any commands ever off propofol she just simply became restless and pulled out her rectal tube.  The nurses are having to hold her down due to agitated state.  I suspect it is chronic pain and potential underlying psychiatric illness.   busPIRone (BUSPAR) tablet 10 mg, 10 mg, Oral, TID, Ahsan Thomas MD, 10 mg at 06/01/24 1357    [Held by provider] venlafaxine (EFFEXOR XR) extended release capsule 75 mg, 75 mg, Oral, Daily, Ahsan Thomas MD, 75 mg at 05/26/24 1823    sodium chloride flush 0.9 % injection 5-40 mL, 5-40 mL, IntraVENous, 2 times per day, Ahsan Thomas MD, 10 mL at 06/01/24 1212    sodium chloride flush 0.9 % injection 5-40 mL, 5-40 mL, IntraVENous, PRN, Ahsan Thomas MD    0.9 % sodium chloride infusion, , IntraVENous, PRN, Ahsan Thomas MD    potassium chloride (KLOR-CON M) extended release tablet 40 mEq, 40 mEq, Oral, PRN **OR** potassium bicarb-citric acid (EFFER-K) effervescent tablet 40 mEq, 40 mEq, Oral, PRN, 40 mEq at 05/29/24 0606 **OR** potassium chloride 10 mEq/100 mL IVPB (Peripheral Line), 10 mEq, IntraVENous, PRN, Ahsan Thomas MD, Last Rate: 100 mL/hr at 05/31/24 1030, 10 mEq at 05/31/24 1030    magnesium sulfate 2000 mg in water 50 mL IVPB, 2,000 mg, IntraVENous, PRN, Ahsan Thomas MD, Stopped at 06/01/24 0901    ondansetron (ZOFRAN-ODT) disintegrating tablet 4 mg, 4 mg, Oral, Q8H PRN **OR** ondansetron (ZOFRAN) injection 4 mg, 4 mg, IntraVENous, Q6H PRN, Ahsan Thomas MD    polyethylene glycol (GLYCOLAX) packet 17 g, 17 g, Oral, Daily PRN, Ahsan Thomas MD    acetaminophen (TYLENOL) tablet 650 mg, 650 mg, Oral, Q6H PRN, 650 mg at 05/31/24 1450 **OR** acetaminophen (TYLENOL) suppository 650 mg, 650 mg, Rectal, Q6H PRN, Ahsan Thomas MD, 650 mg at 05/28/24 2023    glucose chewable tablet 16 g, 4 tablet, Oral, PRN, Ahsan Thomas MD    dextrose bolus 10% 125 mL, 125 mL, IntraVENous, PRN **OR** dextrose bolus 10% 250 mL, 250 mL, IntraVENous, PRN, Ahsan Thomas MD    glucagon injection 1 mg, 1 mg, SubCUTAneous, PRN, Ahsan Thomas MD    dextrose 10 % infusion, , IntraVENous, Continuous PRN, Ahsan Thomas MD    labetalol (NORMODYNE;TRANDATE) injection 10 mg, 10 mg, IntraVENous, Q4H PRN, Ahsan Thomas MD, 10 mg at 06/01/24

## 2024-06-01 NOTE — PROGRESS NOTES
portable view of the chest time stamped at 1501 hours is submitted. Endotracheal tube terminates 3.6 cm above the mason.  Intestinal tube extends below the body of the stomach, tip out of the field of view.  Small left pleural effusion is noted with left basilar opacity which may be related atelectasis or focal airspace disease.  Vascularity is top normal.     1. Endotracheal tube terminates 3.6 cm above the mason. 2. Small left pleural effusion with left basilar opacity which may be related atelectasis or focal airspace disease.     CT CHEST PULMONARY EMBOLISM W CONTRAST    Result Date: 5/26/2024  EXAMINATION: CTA OF THE CHEST 5/26/2024 9:49 am TECHNIQUE: CTA of the chest was performed after the administration of intravenous contrast.  Multiplanar reformatted images are provided for review.  MIP images are provided for review. Automated exposure control, iterative reconstruction, and/or weight based adjustment of the mA/kV was utilized to reduce the radiation dose to as low as reasonably achievable. COMPARISON: None. HISTORY: ORDERING SYSTEM PROVIDED HISTORY: cough, sob, hypoxia TECHNOLOGIST PROVIDED HISTORY: cough, sob, hypoxia Additional Contrast?->1 FINDINGS: Respiratory motion mildly degrades overall evaluation Pulmonary Arteries: Pulmonary arteries are adequately opacified for evaluation.  No evidence of intraluminal filling defect to suggest pulmonary embolism.  Main pulmonary artery is prominent and could indicate underlying pulmonary arterial hypertension. Mediastinum: No evidence of mediastinal lymphadenopathy.  The heart and pericardium demonstrate no acute abnormality.  There is no acute abnormality of the thoracic aorta.  Aberrant right subclavian artery.  Endotracheal tube is above the mason.  Enteric tube within the esophagus with distal aspect below the hemidiaphragm within the stomach with tip not imaged on this exam. Lungs/pleura: Posterior dependent atelectasis or infiltrates.  No pneumothorax  26,  Septic shock secondary to H influenza bacteremia, now improved.  H influenza bacteremia, meningitis.  ID on board, currently receiving ceftriaxone.  Colitis noted on CT scanning abdomen and pelvis, no diarrhea since admission.  On p.o. vancomycin,   Elevated blood pressure, currently on Cardene gtt.  Overall prognosis very poor,  Vent settings reviewed,  CC time 31 minutes  Electronically signed by Sixto Wu MD

## 2024-06-01 NOTE — PROGRESS NOTES
Dr. Son returned call for new consult. He expressed confusion on why leigh was taken out yesterday and as to why he was on the case if patient is still intubated and sedated. Leigh to remain per urology.

## 2024-06-01 NOTE — PROGRESS NOTES
[facility] -   Infectious diseases evaluation  admission date 5/26/2024    reason for consultation:   High fever, unknown etiology sepsis    Impression :   Current:  Haemophilus influenza meningitis/ bacteremia  Sepsis secondary to above  Colitis  Diabetes mellitus  Chronic kidney disease  Hyperlipidemia  Hypertension  GERD  Pagan      Discussion:  Status post lumbar puncture 5/29, cloudy fluid, 280 WBC, meningitis panel was positive for haemophilus influenza.      HENCE:   Continue Ceftriaxone 2 gm IV every 12 hours x at least 14 days   No obvious vegetations per 2 d echo.   Follow CBC and renal function closely.  Vancomycin 125 mg po every 6 hours  Follow blood cultures.  Supportive care.  Discussed with her  at the bedside, RN, Dr. Castro.      Infection Control Recommendations   Stuart Precautions  Droplet Isolation      Antimicrobial Stewardship Recommendations   Simplification of therapy  Targeted therapy      History of Present Illness:   Initial history:  Kavya De Santiago is a 64 y.o.-year-old female was brought to the hospital for altered mental status associated with nausea, vomiting and diarrhea.  The patient is intubated, unable to provide history that was obtained from chart review and  at the bedside had upper respiratory symptoms, congestion, headache and rhinorrhea for 1 week associated with dry cough and decreased appetite.  The patient was found on the ground on the day of admission, EMS called.  The patient was intubated at the ER.  The patient was hypothermic initially then has been running high-grade fever with temperature max of 105, hypotensive, on pressors.  CT head showed no acute abnormality, CTA chest showed no evidence of PE, CT abdomen pelvis showed pancolitis with abnormal wall thickening of the ascending, transverse, descending colon, sigmoid colon and rectum.   Initial lactic acid 3.3, WBC 11.5, procalcitonin  chloride  500 mL IntraVENous Once       Social History:     Social History     Socioeconomic History    Marital status:      Spouse name: Not on file    Number of children: Not on file    Years of education: Not on file    Highest education level: Not on file   Occupational History    Not on file   Tobacco Use    Smoking status: Never    Smokeless tobacco: Never   Vaping Use    Vaping Use: Never used   Substance and Sexual Activity    Alcohol use: Yes     Alcohol/week: 0.0 standard drinks of alcohol     Comment: rarely/ 4 times a year    Drug use: Never    Sexual activity: Yes   Other Topics Concern    Not on file   Social History Narrative    Not on file     Social Determinants of Health     Financial Resource Strain: Low Risk  (11/14/2023)    Overall Financial Resource Strain (CARDIA)     Difficulty of Paying Living Expenses: Not hard at all   Food Insecurity: Not on file (11/14/2023)   Transportation Needs: Unknown (11/14/2023)    PRAPARE - Transportation     Lack of Transportation (Medical): Not on file     Lack of Transportation (Non-Medical): No   Physical Activity: Unknown (9/30/2022)    Exercise Vital Sign     Days of Exercise per Week: 0 days     Minutes of Exercise per Session: Not on file   Stress: Stress Concern Present (9/30/2022)    [place] citizen [place] of Occupational Health - Occupational Stress Questionnaire     Feeling of Stress : To some extent   Social Connections: Socially Integrated (9/30/2022)    Social Connection and Isolation Panel [NHANES]     Frequency of Communication with Friends and Family: More than three times a week     Frequency of Social Gatherings with Friends and Family: Once a week     Attends [employer] Services: More than 4 times per year     Active Member of Clubs or Organizations: Yes     Attends Club or Organization Meetings: More than 4 times per year     Marital Status:    Intimate Partner Violence: Not At Risk (9/30/2022)    Humiliation, Afraid, Rape, and Kick

## 2024-06-02 ENCOUNTER — APPOINTMENT (OUTPATIENT)
Dept: GENERAL RADIOLOGY | Age: 64
End: 2024-06-02
Payer: COMMERCIAL

## 2024-06-02 LAB
ALBUMIN SERPL-MCNC: 2.6 G/DL (ref 3.5–5.2)
ALP SERPL-CCNC: 197 U/L (ref 35–104)
ALT SERPL-CCNC: 60 U/L (ref 5–33)
ANION GAP SERPL CALCULATED.3IONS-SCNC: 8 MMOL/L (ref 9–17)
ARTERIAL PATENCY WRIST A: ABNORMAL
AST SERPL-CCNC: 32 U/L
BASOPHILS # BLD: 0.1 K/UL (ref 0–0.2)
BASOPHILS NFR BLD: 1 % (ref 0–2)
BDY SITE: ABNORMAL
BILIRUB DIRECT SERPL-MCNC: 0.1 MG/DL
BILIRUB INDIRECT SERPL-MCNC: 0.1 MG/DL (ref 0–1)
BILIRUB SERPL-MCNC: 0.2 MG/DL (ref 0.3–1.2)
BUN SERPL-MCNC: 12 MG/DL (ref 8–23)
CALCIUM SERPL-MCNC: 8.3 MG/DL (ref 8.6–10.4)
CHLORIDE SERPL-SCNC: 106 MMOL/L (ref 98–107)
CMV IGG SERPL QL IA: <0.2
CMV IGM SERPL QL IA: 0.1
CO2 SERPL-SCNC: 29 MMOL/L (ref 20–31)
COHGB MFR BLD: 1.2 % (ref 0–5)
CREAT SERPL-MCNC: 0.6 MG/DL (ref 0.5–0.9)
EOSINOPHIL # BLD: 0.6 K/UL (ref 0–0.4)
EOSINOPHILS RELATIVE PERCENT: 7 % (ref 0–4)
ERYTHROCYTE [DISTWIDTH] IN BLOOD BY AUTOMATED COUNT: 13.8 % (ref 11.5–14.9)
FIO2 ON VENT: 40 %
GAS FLOW.O2 O2 DELIVERY SYS: ABNORMAL L/MIN
GAS FLOW.O2 SETTING OXYMISER: 15 L/MIN
GFR, ESTIMATED: >90 ML/MIN/1.73M2
GLUCOSE BLD-MCNC: 168 MG/DL (ref 65–105)
GLUCOSE BLD-MCNC: 185 MG/DL (ref 65–105)
GLUCOSE BLD-MCNC: 187 MG/DL (ref 65–105)
GLUCOSE BLD-MCNC: 238 MG/DL (ref 65–105)
GLUCOSE BLD-MCNC: 277 MG/DL (ref 65–105)
GLUCOSE SERPL-MCNC: 218 MG/DL (ref 70–99)
HCO3 ARTERIAL: 29 MMOL/L (ref 22–26)
HCT VFR BLD AUTO: 25.3 % (ref 36–46)
HGB BLD-MCNC: 8 G/DL (ref 12–16)
LYMPHOCYTES NFR BLD: 1.2 K/UL (ref 1–4.8)
LYMPHOCYTES RELATIVE PERCENT: 15 % (ref 24–44)
MCH RBC QN AUTO: 27.3 PG (ref 26–34)
MCHC RBC AUTO-ENTMCNC: 31.6 G/DL (ref 31–37)
MCV RBC AUTO: 86.3 FL (ref 80–100)
METHEMOGLOBIN: 0.3 % (ref 0–1.9)
MICROORGANISM SPEC CULT: NORMAL
MICROORGANISM SPEC CULT: NORMAL
MONOCYTES NFR BLD: 1 K/UL (ref 0.1–1.3)
MONOCYTES NFR BLD: 12 % (ref 1–7)
NEUTROPHILS NFR BLD: 65 % (ref 36–66)
NEUTS SEG NFR BLD: 5 K/UL (ref 1.3–9.1)
O2 SAT, ARTERIAL: 97.5 % (ref 95–98)
PCO2 ARTERIAL: 41.4 MMHG (ref 35–45)
PEEP RESPIRATORY: 7 CM[H2O]
PH ARTERIAL: 7.46 (ref 7.35–7.45)
PLATELET # BLD AUTO: 244 K/UL (ref 150–450)
PMV BLD AUTO: 7.5 FL (ref 6–12)
PO2 ARTERIAL: 123.4 MMHG (ref 80–100)
POSITIVE BASE EXCESS, ART: 4.9 MMOL/L (ref 0–2)
POTASSIUM SERPL-SCNC: 4 MMOL/L (ref 3.7–5.3)
PROT SERPL-MCNC: 5.6 G/DL (ref 6.4–8.3)
PT. POSITION: ABNORMAL
RBC # BLD AUTO: 2.93 M/UL (ref 4–5.2)
RESPIRATORY RATE: 15
SERVICE CMNT-IMP: NORMAL
SERVICE CMNT-IMP: NORMAL
SODIUM SERPL-SCNC: 143 MMOL/L (ref 135–144)
SPECIMEN DESCRIPTION: NORMAL
SPECIMEN DESCRIPTION: NORMAL
TEXT FOR RESPIRATORY: ABNORMAL
TOTAL RATE: 24
VENTILATION MODE VENT: ABNORMAL
VT: 410
WBC OTHER # BLD: 7.8 K/UL (ref 3.5–11)

## 2024-06-02 PROCEDURE — 86663 EPSTEIN-BARR ANTIBODY: CPT

## 2024-06-02 PROCEDURE — 86038 ANTINUCLEAR ANTIBODIES: CPT

## 2024-06-02 PROCEDURE — 99232 SBSQ HOSP IP/OBS MODERATE 35: CPT | Performed by: NURSE PRACTITIONER

## 2024-06-02 PROCEDURE — 6360000002 HC RX W HCPCS: Performed by: INTERNAL MEDICINE

## 2024-06-02 PROCEDURE — 86665 EPSTEIN-BARR CAPSID VCA: CPT

## 2024-06-02 PROCEDURE — 74018 RADEX ABDOMEN 1 VIEW: CPT

## 2024-06-02 PROCEDURE — A4216 STERILE WATER/SALINE, 10 ML: HCPCS | Performed by: INTERNAL MEDICINE

## 2024-06-02 PROCEDURE — 80048 BASIC METABOLIC PNL TOTAL CA: CPT

## 2024-06-02 PROCEDURE — 2580000003 HC RX 258: Performed by: INTERNAL MEDICINE

## 2024-06-02 PROCEDURE — 6370000000 HC RX 637 (ALT 250 FOR IP)

## 2024-06-02 PROCEDURE — 2580000003 HC RX 258

## 2024-06-02 PROCEDURE — 80076 HEPATIC FUNCTION PANEL: CPT

## 2024-06-02 PROCEDURE — 86225 DNA ANTIBODY NATIVE: CPT

## 2024-06-02 PROCEDURE — 2580000003 HC RX 258: Performed by: PSYCHIATRY & NEUROLOGY

## 2024-06-02 PROCEDURE — 99233 SBSQ HOSP IP/OBS HIGH 50: CPT | Performed by: PSYCHIATRY & NEUROLOGY

## 2024-06-02 PROCEDURE — 83516 IMMUNOASSAY NONANTIBODY: CPT

## 2024-06-02 PROCEDURE — 99291 CRITICAL CARE FIRST HOUR: CPT | Performed by: INTERNAL MEDICINE

## 2024-06-02 PROCEDURE — 82947 ASSAY GLUCOSE BLOOD QUANT: CPT

## 2024-06-02 PROCEDURE — 6360000002 HC RX W HCPCS: Performed by: PSYCHIATRY & NEUROLOGY

## 2024-06-02 PROCEDURE — 6370000000 HC RX 637 (ALT 250 FOR IP): Performed by: INTERNAL MEDICINE

## 2024-06-02 PROCEDURE — 86645 CMV ANTIBODY IGM: CPT

## 2024-06-02 PROCEDURE — 85025 COMPLETE CBC W/AUTO DIFF WBC: CPT

## 2024-06-02 PROCEDURE — 2700000000 HC OXYGEN THERAPY PER DAY

## 2024-06-02 PROCEDURE — 86376 MICROSOMAL ANTIBODY EACH: CPT

## 2024-06-02 PROCEDURE — APPSS30 APP SPLIT SHARED TIME 16-30 MINUTES: Performed by: NURSE PRACTITIONER

## 2024-06-02 PROCEDURE — 36600 WITHDRAWAL OF ARTERIAL BLOOD: CPT

## 2024-06-02 PROCEDURE — 82805 BLOOD GASES W/O2 SATURATION: CPT

## 2024-06-02 PROCEDURE — 2000000000 HC ICU R&B

## 2024-06-02 PROCEDURE — 86664 EPSTEIN-BARR NUCLEAR ANTIGEN: CPT

## 2024-06-02 PROCEDURE — 94761 N-INVAS EAR/PLS OXIMETRY MLT: CPT

## 2024-06-02 PROCEDURE — 86644 CMV ANTIBODY: CPT

## 2024-06-02 PROCEDURE — 2500000003 HC RX 250 WO HCPCS: Performed by: INTERNAL MEDICINE

## 2024-06-02 PROCEDURE — 6360000002 HC RX W HCPCS

## 2024-06-02 PROCEDURE — 36415 COLL VENOUS BLD VENIPUNCTURE: CPT

## 2024-06-02 PROCEDURE — 99231 SBSQ HOSP IP/OBS SF/LOW 25: CPT | Performed by: INTERNAL MEDICINE

## 2024-06-02 PROCEDURE — 71045 X-RAY EXAM CHEST 1 VIEW: CPT

## 2024-06-02 PROCEDURE — C9113 INJ PANTOPRAZOLE SODIUM, VIA: HCPCS | Performed by: INTERNAL MEDICINE

## 2024-06-02 RX ORDER — LISINOPRIL 20 MG/1
40 TABLET ORAL DAILY
Status: DISCONTINUED | OUTPATIENT
Start: 2024-06-02 | End: 2024-06-14 | Stop reason: HOSPADM

## 2024-06-02 RX ORDER — FUROSEMIDE 10 MG/ML
40 INJECTION INTRAMUSCULAR; INTRAVENOUS 2 TIMES DAILY
Status: COMPLETED | OUTPATIENT
Start: 2024-06-02 | End: 2024-06-02

## 2024-06-02 RX ADMIN — FUROSEMIDE 40 MG: 10 INJECTION, SOLUTION INTRAMUSCULAR; INTRAVENOUS at 18:17

## 2024-06-02 RX ADMIN — Medication 100 MCG/HR: at 03:20

## 2024-06-02 RX ADMIN — INSULIN LISPRO 4 UNITS: 100 INJECTION, SOLUTION INTRAVENOUS; SUBCUTANEOUS at 11:58

## 2024-06-02 RX ADMIN — DEXMEDETOMIDINE HYDROCHLORIDE 0.5 MCG/KG/HR: 400 INJECTION INTRAVENOUS at 11:06

## 2024-06-02 RX ADMIN — SODIUM CHLORIDE, PRESERVATIVE FREE 10 ML: 5 INJECTION INTRAVENOUS at 09:13

## 2024-06-02 RX ADMIN — ENOXAPARIN SODIUM 30 MG: 100 INJECTION SUBCUTANEOUS at 09:11

## 2024-06-02 RX ADMIN — BUSPIRONE HYDROCHLORIDE 10 MG: 10 TABLET ORAL at 21:25

## 2024-06-02 RX ADMIN — CEFTRIAXONE SODIUM 2000 MG: 2 INJECTION, POWDER, FOR SOLUTION INTRAMUSCULAR; INTRAVENOUS at 04:52

## 2024-06-02 RX ADMIN — LATANOPROST 1 DROP: 50 SOLUTION OPHTHALMIC at 21:26

## 2024-06-02 RX ADMIN — DEXMEDETOMIDINE HYDROCHLORIDE 0.7 MCG/KG/HR: 400 INJECTION INTRAVENOUS at 00:23

## 2024-06-02 RX ADMIN — INSULIN LISPRO 2 UNITS: 100 INJECTION, SOLUTION INTRAVENOUS; SUBCUTANEOUS at 09:11

## 2024-06-02 RX ADMIN — INSULIN GLARGINE 20 UNITS: 100 INJECTION, SOLUTION SUBCUTANEOUS at 09:11

## 2024-06-02 RX ADMIN — ANTI-FUNGAL POWDER MICONAZOLE NITRATE TALC FREE: 1.42 POWDER TOPICAL at 21:26

## 2024-06-02 RX ADMIN — LEVETIRACETAM 500 MG: 100 INJECTION, SOLUTION INTRAVENOUS at 17:13

## 2024-06-02 RX ADMIN — VANCOMYCIN HYDROCHLORIDE 125 MG: KIT at 03:14

## 2024-06-02 RX ADMIN — DEXMEDETOMIDINE HYDROCHLORIDE 0.4 MCG/KG/HR: 400 INJECTION INTRAVENOUS at 23:52

## 2024-06-02 RX ADMIN — DEXMEDETOMIDINE HYDROCHLORIDE 0.9 MCG/KG/HR: 400 INJECTION INTRAVENOUS at 05:59

## 2024-06-02 RX ADMIN — FUROSEMIDE 40 MG: 10 INJECTION, SOLUTION INTRAMUSCULAR; INTRAVENOUS at 08:46

## 2024-06-02 RX ADMIN — VANCOMYCIN HYDROCHLORIDE 125 MG: KIT at 21:25

## 2024-06-02 RX ADMIN — PANTOPRAZOLE SODIUM 40 MG: 40 INJECTION, POWDER, FOR SOLUTION INTRAVENOUS at 09:11

## 2024-06-02 RX ADMIN — METOPROLOL TARTRATE 75 MG: 50 TABLET, FILM COATED ORAL at 21:24

## 2024-06-02 RX ADMIN — Medication 125 MCG/HR: at 12:07

## 2024-06-02 RX ADMIN — ENOXAPARIN SODIUM 30 MG: 100 INJECTION SUBCUTANEOUS at 21:25

## 2024-06-02 RX ADMIN — ROPINIROLE HYDROCHLORIDE 2 MG: 2 TABLET, FILM COATED ORAL at 21:25

## 2024-06-02 RX ADMIN — Medication 75 MCG/HR: at 20:59

## 2024-06-02 RX ADMIN — ACETAMINOPHEN 650 MG: 325 TABLET ORAL at 21:27

## 2024-06-02 RX ADMIN — CEFTRIAXONE SODIUM 2000 MG: 2 INJECTION, POWDER, FOR SOLUTION INTRAMUSCULAR; INTRAVENOUS at 16:34

## 2024-06-02 RX ADMIN — ATORVASTATIN CALCIUM 40 MG: 40 TABLET, FILM COATED ORAL at 21:25

## 2024-06-02 ASSESSMENT — PULMONARY FUNCTION TESTS
PIF_VALUE: 17
PIF_VALUE: 14
PIF_VALUE: 15
PIF_VALUE: 13
PIF_VALUE: 14
PIF_VALUE: 15
PIF_VALUE: 20
PIF_VALUE: 15
PIF_VALUE: 12
PIF_VALUE: 17
PIF_VALUE: 16
PIF_VALUE: 13
PIF_VALUE: 17
PIF_VALUE: 15
PIF_VALUE: 13
PIF_VALUE: 14
PIF_VALUE: 16
PIF_VALUE: 15
PIF_VALUE: 17
PIF_VALUE: 17
PIF_VALUE: 15
PIF_VALUE: 13
PIF_VALUE: 15
PIF_VALUE: 16
PIF_VALUE: 17
PIF_VALUE: 18
PIF_VALUE: 16
PIF_VALUE: 15
PIF_VALUE: 17
PIF_VALUE: 21
PIF_VALUE: 17
PIF_VALUE: 16
PIF_VALUE: 14
PIF_VALUE: 21
PIF_VALUE: 13
PIF_VALUE: 14

## 2024-06-02 ASSESSMENT — PAIN SCALES - GENERAL
PAINLEVEL_OUTOF10: 6
PAINLEVEL_OUTOF10: 0

## 2024-06-02 NOTE — PLAN OF CARE
Problem: Safety - Medical Restraint  Goal: Remains free of injury from restraints (Restraint for Interference with Medical Device)  Description: INTERVENTIONS:  1. Determine that other, less restrictive measures have been tried or would not be effective before applying the restraint  2. Evaluate the patient's condition at the time of restraint application  3. Inform patient/family regarding the reason for restraint  4. Q2H: Monitor safety, psychosocial status, comfort, nutrition and hydration  6/2/2024 1842 by Ranjan Melgoza RN  Outcome: Progressing  6/2/2024 0608 by Monie Chandra RN  Outcome: Progressing  Flowsheets (Taken 6/2/2024 0608)  Remains free of injury from restraints (restraint for interference with medical device):   Determine that other, less restrictive measures have been tried or would not be effective before applying the restraint   Every 2 hours: Monitor safety, psychosocial status, comfort, nutrition and hydration     Problem: Safety - Adult  Goal: Free from fall injury  6/2/2024 1842 by Ranjan Melgoza RN  Outcome: Progressing  6/2/2024 0608 by Monie Chandra RN  Outcome: Progressing     Problem: Cardiovascular - Adult  Goal: Maintains optimal cardiac output and hemodynamic stability  6/2/2024 1842 by Ranjan Melgoza RN  Outcome: Progressing  6/2/2024 0608 by Monie Chandra RN  Outcome: Progressing     Problem: Cardiovascular - Adult  Goal: Absence of cardiac dysrhythmias or at baseline  Outcome: Progressing

## 2024-06-02 NOTE — PROGRESS NOTES
[facility] PULMONARY,CRITICAL CARE & SLEEP   Murray Burris MD/Rosendo Khan MD  Kavya Bunn ORLY AGACNP-BC, NP-C      Nadja VILLALBA NP-C     Lynn Fritz APRN NP-C      Ayo Han APRRAIMUNDO NP-C                                  Pulmonary Critical Care Progress Note    Patient - Kavya De Santiago   Age - 64 y.o.   - 1960  MRN - [id number]  [id number]  Date of Admission - 2024  8:51 AM    Consulting Service/Physician:       Primary Care Physician: Shaina Hamilton MD    SUBJECTIVE:     Chief Complaint:   Chief Complaint   Patient presents with    Loss of Consciousness   Sepsis  Subjective:    We attempted to keep her off sedation as long as possible yesterday but unfortunately she became very agitated, restless, diaphoretic, hypertensive tachycardic and hypoxic with high respiratory rate.  We put her back on full ventilator support and had to use heavy doses of sedation because she had pulled out her rectal tube accidentally with her foot in addition to pulling restraints and attempting to get out of bed.  She was having purposeful movement but at no time was she answering questions or following any basic commands.  It became unsafe and was requiring multiple nurses at the bedside to restrain her so we eventually did resedate her this time with fentanyl and Precedex which seems to be working fine.    Thankfully, the nurses were able to get the Precedex down to 0.9 and fentanyl down to 125.  She currently appears calm.  She does open her eyes.  She does seem to track to her name.  She occasionally shakes her head yes and no but it does not seem to be exactly accurate but at least she is responding.  I could not get her to follow any commands but she is moving all 4 extremities.    She is currently on 30% FiO2 with pressure support of 10 on CPAP.  Respiratory rate is between 12 and 16.  End-tidal CO2 is 30 and tidal volumes are between 400-500 mL.    She did have a fever  at [facility] but has never taken them at home on a chronic basis.

## 2024-06-02 NOTE — PROGRESS NOTES
Order obtained for extubation.  SpO2 of 98 on 30% FiO2.   Patient extubated and placed on 3 liters/min via nasal cannula.   Post extubation SpO2 is 98% with HR  58 bpm and RR 16 breaths/min.    Patient had a weak cough that was non-productive.  Extubation was well tolerated by the patient.  Breath Sounds: clear, diminished throughout    Sheila Zabala   8:44am

## 2024-06-02 NOTE — PROGRESS NOTES
Patient off floor for CT scan with writer, RN and RT. On monitor and sedation    2015- Patient returned to room from CT scan    Your laceration was repaired today  Leave dressing in place x 24 hours  You may need to soak your dressing in a saline solution or warm soapy water for a few minutes if stuck to the sutures to remove it  Keep wound clean and dry  Wash gently with soap and water as needed, do not submerge or soak as this will delay healing and increase risk of infection  Take antibiotics as directed until complete  Use over the counter ibuprofen and/or tylenol as directed on the bottle as needed for pain  Return to clinic in 7-10 days for suture removal or sooner if signs of infection develop (erythema, worsening pain, or thick white or yellow drainage)  Laceration   AMBULATORY CARE:   A laceration  is an injury to the skin and the soft tissue underneath it  Lacerations can happen anywhere on the body  Common symptoms include the following:   · Injury or wound to skin and tissue of any shape size that looks like a cut, tear, or gash    · Edges of the wound may be close together or wide apart    · Pain, bleeding, bruising, or swelling    · Numbness around the wound    · Decreased movement in an area below the wound    Seek care immediately if:   · You have heavy bleeding or bleeding that does not stop after 10 minutes of holding firm, direct pressure over the wound  · Your wound opens up  Call your doctor if:   · You have a fever or chills  · Your laceration is red, warm, or swollen  · You have red streaks on your skin coming from your wound  · You have white or yellow drainage from the wound that smells bad  · You have pain that gets worse, even after treatment  · You have questions or concerns about your condition or care  Treatment for a laceration  includes care to stop any bleeding  Your healthcare provider will stop the bleeding by applying pressure to the wound  He or she may need to check your wound for foreign objects and clean it to decrease the chance of infection   Your laceration may be closed with stitches, staples, tissue glue, or medical strips  Ask your healthcare provider if you need a tetanus shot  Medicines: You may need any of the following:  · Prescription pain medicine  may be given  Ask your healthcare provider how to take this medicine safely  Some prescription pain medicines contain acetaminophen  Do not take other medicines that contain acetaminophen without talking to your healthcare provider  Too much acetaminophen may cause liver damage  Prescription pain medicine may cause constipation  Ask your healthcare provider how to prevent or treat constipation  · Antibiotics  help treat or prevent a bacterial infection  · Take your medicine as directed  Contact your healthcare provider if you think your medicine is not helping or if you have side effects  Tell him or her if you are allergic to any medicine  Keep a list of the medicines, vitamins, and herbs you take  Include the amounts, and when and why you take them  Bring the list or the pill bottles to follow-up visits  Carry your medicine list with you in case of an emergency  Care for your wound as directed:   · Do not get your wound wet  until your healthcare provider says it is okay  Do not soak your wound in water  Do not go swimming until your healthcare provider says it is okay  Carefully wash the wound with soap and water  Gently pat the area dry or allow it to air dry  · Change your bandages  when they get wet, dirty, or after washing  Apply new, clean bandages as directed  Do not apply elastic bandages or tape too tight  Do not put powders or lotions over your incision  · Apply antibiotic ointment as directed  Your healthcare provider may give you antibiotic ointment to put over your wound if you have stitches  If you have strips of tape over your incision, let them dry up and fall off on their own  If they do not fall off within 14 days, gently remove them   If you have glue over your wound, do not remove or pick at it  If your glue comes off, do not replace it with glue that you have at home  · Check your wound every day for signs of infection, such as swelling, redness, or pus  Self-care:   · Apply ice  on your wound for 15 to 20 minutes every hour or as directed  Use an ice pack, or put crushed ice in a plastic bag  Cover it with a towel  Ice helps prevent tissue damage and decreases swelling and pain  · Use a splint as directed  A splint will decrease movement and stress on your wound  It may help it heal faster  A splint may be used for lacerations over joints or areas of your body that bend  Ask your healthcare provider how to apply and remove a splint  · Decrease scarring of your wound  by applying ointments as directed  Do not apply ointments until your healthcare provider says it is okay  You may need to wait until your wound is healed  Ask which ointment to buy and how often to use it  After your wound is healed, use sunscreen over the area when you are out in the sun  You should do this for at least 6 months to 1 year after your injury  Follow up with your doctor as directed: You will need to return in 3 to 14 days to have stitches or staples removed  Write down your questions so you remember to ask them during your visits  © Copyright [address] Information is for End User's use only and may not be sold, redistributed or otherwise used for commercial purposes  All illustrations and images included in CareNotes® are the copyrighted property of A D A M , Inc  or [address] [place] [address] Andie   The above information is an  only  It is not intended as medical advice for individual conditions or treatments  Talk to your doctor, nurse or pharmacist before following any medical regimen to see if it is safe and effective for you

## 2024-06-02 NOTE — FLOWSHEET NOTE
06/02/24 0456   Vielka Coma Scale   Eye Opening 2   Best Verbal Response 1   Best Motor Response 4   [place] Coma Scale Score 7   OPO Notified Yes   Date OPO Notified 06/02/24   Time OPO Notified 0458   OPO Referral Number [id number]     Spoke to Vanna at [facility], per protocol based of GCS-will have some one call.     05:19 spoke to maeve for [facility] will follow for now

## 2024-06-02 NOTE — PROGRESS NOTES
eye movements intact, conjunctivae normal  ENT:does not respond to verbal commands  Neck: neck supple and non tender without mass, no thyromegaly or thyroid nodules, no cervical lymphadenopathy   Pulmonary/Chest: clear to auscultation bilaterally- no wheezes, rales or rhonchi, normal air movement, no respiratory distress  Cardiovascular: bradycardic  rate, regular rhythm, normal S1 and S2, no murmurs, rubs, clicks or gallops, distal pulses intact, no carotid bruits  Abdomen: soft, non-tender, obese non-distended, normal bowel sounds, no masses or organomegaly fms with liquid brown stool   Extremities: no cyanosis, clubbing positive for extremity  edema  Musculoskeletal:  no joint swelling, deformity  Neurologic: sedated not responding to commands    Lab and Imaging Review     CBC  Recent Labs     05/31/24  0405 06/01/24  0525 06/02/24  0734   WBC 7.9 8.8 7.8   HGB 8.3* 8.5* 8.0*   HCT 25.3* 26.8* 25.3*   MCV 85.4 86.1 86.3    282 244       BMP  Recent Labs     05/31/24  0405 05/31/24  1617 06/01/24  0525 06/02/24  0510     --  142 143   K 3.3* 3.5* 3.6* 4.0   *  --  105 106   CO2 26  --  29 29   BUN 8  --  9 12   CREATININE 0.6  --  0.6 0.6   GLUCOSE 277*  --  259* 218*   CALCIUM 8.1*  --  8.4* 8.3*   MG 1.5*  --  1.6  --        LFTS  Recent Labs     06/01/24  0525 06/02/24  0510   ALKPHOS 223* 197*   ALT 81* 60*   AST 42* 32*   BILITOT 0.2* 0.2*   BILIDIR 0.1 0.1      Latest Reference Range & Units 06/01/24 05:25   Total CK 26 - 192 U/L 179      Latest Reference Range & Units 06/01/24 05:25   Myoglobin 25 - 58 ng/mL 126 (H)   (H): Data is abnormally high      ANEMIA STUDIES  Recent Labs     06/01/24  0919   TIBC 215*   FERRITIN 280*   [id number] 1159   FOLATE 11.1      Latest Reference Range & Units 06/01/24 09:19   Hep A IgM NONREACTIVE  NONREACTIVE   Hepatitis B Surface Ag NONREACTIVE  NONREACTIVE   Hepatitis C Ab NONREACTIVE  NONREACTIVE   Hep B Core Ab, IgM NONREACTIVE  NONREACTIVE     Ct abd  5/26/24  FINDINGS:  Lower Chest: There is dependent atelectasis in the right and left lung bases.     Organs:  Liver demonstrates no suspicious mass. There is no evidence of  intrahepatic biliary ductal dilatation. The spleen, pancreas and adrenal  glands are unremarkable. The kidneys demonstrate no evidence of  hydronephrosis.     GI/Bowel:  There is no evidence of bowel obstruction.  There is abnormal  bowel wall thickening of the ascending, transverse, descending colon, sigmoid  colon and rectum consistent with colitis.     Pelvis:  No acute abnormality of the pelvis organs or bladder.     Peritoneum/Retroperitoneum: No evidence of ascites or free air.     Bones/Soft Tissues: There is umbilical hernia containing fat measures 2 cm.     IMPRESSION:  1. Abnormal bowel wall thickening of the ascending, transverse, descending  colon, sigmoid colon and rectum consistent with colitis.  2. Umbilical hernia containing fat measures 2 cm.    ASSESSMENT/plan  Diarrhea, imaging showing pancolitis, stool cultures negative on empiric vanco per ID, output decreased overnight,no peritoneal signs on exam  Mgt per ID    2.anemia, no overt bleeding  Anemia profile shows adequate iron stores  Trend hh  Ppi  Not stable for endoscopy     3.elevated lft liver us 2015 showing steatosis, lft elevated since 2022, ct abd this admission shows no dilatation or overt mass  ebv cmv sma mitochondrial ab harpreet anti liver kid ab results are pending  Recheck lft in am     4.acute hypoxic respiratory failure-extubated today    5.sepsis secondary to haemophilus influenza meningitis bacteremia  HSV ordered     6.AMS mgt per neuro     This plan was formulated in collaboration with Dr. Nettles  .    Electronically signed by: ORLY Kelly CNP on 6/2/2024 at 11:19 AM     Attending Physician Statement  I have discussed the care of Kavya De Santiago and I have examined the patient myselft and taken ros and hpi , including pertinent history and

## 2024-06-02 NOTE — PLAN OF CARE
Problem: Safety - Medical Restraint  Goal: Remains free of injury from restraints (Restraint for Interference with Medical Device)  Outcome: Progressing  Flowsheets (Taken 6/2/2024 0608)  Remains free of injury from restraints (restraint for interference with medical device):   Determine that other, less restrictive measures have been tried or would not be effective before applying the restraint   Every 2 hours: Monitor safety, psychosocial status, comfort, nutrition and hydration     Problem: Safety - Adult  Goal: Free from fall injury  Outcome: Progressing     Problem: Discharge Planning  Goal: Discharge to home or other facility with appropriate resources  Flowsheets (Taken 6/2/2024 0608)  Discharge to home or other facility with appropriate resources:   Arrange for needed discharge resources and transportation as appropriate   Arrange for interpreters to assist at discharge as needed   Identify discharge learning needs (meds, wound care, etc)     Problem: Cardiovascular - Adult  Goal: Maintains optimal cardiac output and hemodynamic stability  Outcome: Progressing     Problem: Skin/Tissue Integrity - Adult  Goal: Skin integrity remains intact  Outcome: Progressing     Problem: Infection - Adult  Goal: Absence of infection at discharge  Outcome: Progressing     Problem: Neurosensory - Adult  Goal: Achieves stable or improved neurological status  Outcome: Progressing     Problem: Respiratory - Adult  Goal: Achieves optimal ventilation and oxygenation  Outcome: Progressing     Problem: Genitourinary - Adult  Goal: Absence of urinary retention  Outcome: Progressing     Problem: Metabolic/Fluid and Electrolytes - Adult  Goal: Electrolytes maintained within normal limits  Outcome: Progressing

## 2024-06-02 NOTE — PROGRESS NOTES
RN removed restraints from patient. Daughter in room. Patient making non-purposeful movements. 4 siderails put up at family's request for patient safety.

## 2024-06-02 NOTE — PROGRESS NOTES
NEUROLOGY INPATIENT PROGRESS NOTE    6/2/2024         Kavya De Santiago is a  64 y.o. female admitted on 5/26/2024 with  Colitis [K52.9]    Reason for consult: Intubated, not following commands  History is obtained mostly from the medical record and from the caregivers. Chart is reviewed and patient is examined.   Briefly, this is a  64 y.o. female with hx of HTN, DM, DIEGO and CKD was admitted on 5/26/2024 with altered mentation.  Initial history obtained from EMS report and patient's .  EMS report states that they were called to the patient's house for c/o nausea and vomiting and upon evaluation by EMS noted to be with abnormal glucose concerning for DKA.  While transporting the patient to the hospital; she became completely unresponsive with O2 saturations dropping for which patient was placed on supraglottic airway.  She was hypertensive on arrival with blood pressures with systolic around 160 mm Hg.  Patient was also noted to be febrile with Tmax 104 °F.  CT head on admit is negative for acute intracranial abnormalities.  Spinal fluid studies done and does help significantly abnormal with elevated CSF protein and neutrophilic pleocytosis with meningitis panel positive for H influenza for which patient has been on IV ceftriaxone and vancomycin.  Patient remained on vent since admitted.  Weaning trials were done and patient was off of sedation for several hours today.  Patient was not following commands.  Then patient became extremely violent and needed to be resumed back on sedation and presently on fentanyl and Precedex drip.  Caregivers also stated that patient has been on sedation holidays and has not been following commands.  6/2/2024: Chart reviewed and discussed with caregivers.  Examined the patient.  Patient  is extubated today.  Patient is on tapering down doses of sedatives.  Patient's  at bedside stated that after extubation today, she did talk to her  at bedside stating  performed without the administration of intravenous  contrast. Automated exposure control, iterative reconstruction, and/or weight  based adjustment of the mA/kV was utilized to reduce the radiation dose to as  low as reasonably achievable.    COMPARISON:  None.    HISTORY:  ORDERING SYSTEM PROVIDED HISTORY: ams, vomiting,  TECHNOLOGIST PROVIDED HISTORY:  ams, vomiting,    Decision Support Exception - unselect if not a suspected or confirmed  emergency medical condition->Emergency Medical Condition (MA)  Reason for Exam: Found down, LT pupil more reactive than RT. Nausea,  vomiting.  Intubated in route    FINDINGS:  BRAIN/VENTRICLES: There is no acute intracranial hemorrhage, mass effect or  midline shift.  No abnormal extra-axial fluid collection.  The gray-white  differentiation is maintained without evidence of an acute infarct.  There is  no evidence of hydrocephalus.    ORBITS: The visualized portion of the orbits demonstrate no acute abnormality.    SINUSES: The visualized paranasal sinuses and mastoid air cells demonstrate  no acute abnormality.    SOFT TISSUES/SKULL:  No acute abnormality of the visualized skull or soft  tissues.    Impression  No acute intracranial abnormality.      Chest X-ray: Stable cardiomegaly.  Mild pulmonary vascular congestion.  Low lung volume exam.  Trace bilateral effusions, stable.  Stable mild retrocardiac airspace opacity.    CT head 5/26/2024: No acute intracranial abnormality    CSF 5/29/2024: ,  (86% neutrophils, 11% lymphs).,  Glucose 68, protein 253., meningitis panel +ve for H influenza    CT head (with) 6/1/2024: No acute intracranial abnormality or abnormal enhancement.    CTA head and neck 6/1/2024: No significant stenosis or occlusion of major arterial vessels of the head and neck.    MRI brain: pending    EEG: pending             Impression and Plan: Ms. Kavya De Santiago is a 64 y.o. female with   Acute persistent encephalopathy with abnormal CSF

## 2024-06-02 NOTE — PROGRESS NOTES
[facility] -   Infectious diseases evaluation  admission date 5/26/2024    reason for consultation:   High fever, unknown etiology sepsis    Impression :   Current:  Haemophilus influenza meningitis/ bacteremia  Sepsis secondary to above  Colitis  Diabetes mellitus  Chronic kidney disease  Hyperlipidemia  Hypertension  GERD  [place]agan      Discussion:  Status post lumbar puncture 5/29, cloudy fluid, 280 WBC, meningitis panel was positive for haemophilus influenza.      HENCE:   Continue Ceftriaxone 2 gm IV every 12 hours x at least 14 days   No obvious vegetations per 2 d echo.   Follow CBC and renal function closely.  Vancomycin 125 mg po every 6 hours  Follow blood cultures.  Supportive care.  Discussed with her  at the bedside, RN.      Infection Control Recommendations   [place] Precautions      Antimicrobial Stewardship Recommendations   Simplification of therapy  Targeted therapy      History of Present Illness:   Initial history:  Kavya De Santiago is a 64 y.o.-year-old female was brought to the hospital for altered mental status associated with nausea, vomiting and diarrhea.  The patient is intubated, unable to provide history that was obtained from chart review and  at the bedside had upper respiratory symptoms, congestion, headache and rhinorrhea for 1 week associated with dry cough and decreased appetite.  The patient was found on the ground on the day of admission, EMS called.  The patient was intubated at the ER.  The patient was hypothermic initially then has been running high-grade fever with temperature max of 105, hypotensive, on pressors.  CT head showed no acute abnormality, CTA chest showed no evidence of PE, CT abdomen pelvis showed pancolitis with abnormal wall thickening of the ascending, transverse, descending colon, sigmoid colon and rectum.   Initial lactic acid 3.3, WBC 11.5, procalcitonin 11.4  COVID-19 influenza combo  2008    with BSO    OTHER SURGICAL HISTORY Left 11/23/2022    cubital tunnel release    SHOULDER SURGERY Left 02/17/2021    SHOULDER MANIPULATION WITH PRE OP INTERSCALENE BLOCK and intraop injection performed by Ahsan Brown DO at [facility] OR    SPINE SURGERY Left 10/03/2022    S1 nerve root injection, Dr. Alston    SPINE SURGERY Left 10/06/2022    S1 NERVE ROOT INJECTION performed by Rema Alston MD at [facility] OR    TONSILLECTOMY      as child    UPPER GASTROINTESTINAL ENDOSCOPY  07/23/2012    [facility] [place]       Medications:      lisinopril  40 mg Oral Daily    furosemide  40 mg IntraVENous BID    busPIRone  10 mg Per NG tube TID    insulin glargine  20 Units SubCUTAneous Daily    metoprolol tartrate  75 mg Oral BID    miconazole   Topical BID    enoxaparin  30 mg SubCUTAneous BID    vancomycin  125 mg Oral 4 times per day    rOPINIRole  2 mg Oral BID    pantoprazole (PROTONIX) 40 mg in sodium chloride (PF) 0.9 % 10 mL injection  40 mg IntraVENous Daily    insulin lispro  0-8 Units SubCUTAneous Q4H    cefTRIAXone (ROCEPHIN) IV  2,000 mg IntraVENous Q12H    aspirin  81 mg Oral Daily    atorvastatin  40 mg Oral Nightly    latanoprost  1 drop Both Eyes Nightly    [Held by provider] venlafaxine  75 mg Oral Daily    sodium chloride flush  5-40 mL IntraVENous 2 times per day    sodium chloride  500 mL IntraVENous Once       Social History:     Social History     Socioeconomic History    Marital status:      Spouse name: Not on file    Number of children: Not on file    Years of education: Not on file    Highest education level: Not on file   Occupational History    Not on file   Tobacco Use    Smoking status: Never    Smokeless tobacco: Never   Vaping Use    Vaping Use: Never used   Substance and Sexual Activity    Alcohol use: Yes     Alcohol/week: 0.0 standard drinks of alcohol     Comment: rarely/ 4 times a year    Drug use: Never    Sexual activity: Yes   Other Topics Concern    Not on file   Social

## 2024-06-02 NOTE — PROGRESS NOTES
[facility]  IN-PATIENT SERVICE  [facility]    PROGRESS NOTE             6/2/2024    7:35 AM    Name:   Kavya De Santiago  MRN:     [id number]     Acct:      [id number]   Room:   2003/2003-01  IP Day:  7  Admit Date:  5/26/2024  8:51 AM    PCP:  Shaina Hamilton MD  Code Status:  Full Code    Subjective:     C/C:   Chief Complaint   Patient presents with    Loss of Consciousness     Interval History Status: not changed.  Patient is seen and examined at bedside.  Patient yesterday failed sedation weaning trial, she becomes very agitated does not follow commands.  Propofol was changed to Precedex she still needs fentanyl.  We have differentials opioid withdrawal/meningitis sequela  Patient was extubated this morning, still on Precedex and fentanyl, will try to wean her off both.  Extensive history required in terms of opioid dependence, will discharge her EMR more for complete details of opioids that she was taking before.   Patient has a rectal tube in, still having diarrhea.  Urology was taken on board yesterday they recommend keeping Bourgeois in send  Neurology was also consulted yesterday to recommend doing MRI and EEG    Brief History:     64-year-old female with history of T2DM, HTN, HLD, CKD, GERD, DIEGO, obesity who was brought to the ED for AMS, nausea, vomiting, diarrhea. Patient is intubated at the time of my visit, her  Rj is present to answer questions. Reportedly patient had some upper respiratory symptoms over the past week or so, with a dry cough, decreased appetite, rhinorrhea, congestion, headache/sinus pain. This morning at 4 AM patient began to have diarrhea,  is unsure if it was bloody or not, and she also had several episodes of vomiting. When he checked on her he found her on the ground, EMS was called, they arrived she has a GCS was 8-9, O2 saturations dropped on the way to the ED, given etomidate and Versed and placed a supraglottic

## 2024-06-02 NOTE — CONSULTS
Urology Consultation    Patient:  Kavya De Santiago  MRN: [id number]    CHIEF COMPLAINT: Urinary retention    HISTORY OF PRESENT ILLNESS:   The patient is a 64 y.o. female who presents with bacterial meningitis.  Per ICU protocol her Bourgeois catheter was removed yesterday.  At that point she was still intubated and sedated.  She obviously cannot void so the Bourgeois catheter was reinserted.  The urine is clear.  She was extubated this morning.  She is not having any respiratory problems but she is still under sedation.  UA yesterday just showed moderate glucose trace hemoglobin.    Patient's old records, notes and chart reviewed and summarized above.    Past Medical History:    Past Medical History:   Diagnosis Date    Cervical spondylosis 04/2009    c-4 c-5, c-5 c-6, c-6 c-7    CKD (chronic kidney disease)     per pt, does not have nephrologist    COVID-19 12/06/2021    nasal congestion, fatique and myalgia x 3 weeks    Fall     PATIENT FELL 2 WEEKS AGO LANDED ON AMX    Generalized anxiety disorder 12/08/2020    GERD (gastroesophageal reflux disease)     History of recent hospitalization 09/30/2022    til 10/9/22 at [facility]    History of swelling of feet     lasix QOD for this    Hyperlipidemia     Hypertension     Left hand weakness     and pain, can not make fist d/t cubital tunnel issue    Lumbar radiculopathy 11/10/2021    was following with pain management and has had epidural injections    DIEGO (nonalcoholic steatohepatitis) 10/12/2014    Obesity     Osteoarthritis of left knee 08/19/2014    Recurrent major depressive disorder (HCC) 07/15/2022    Restless legs syndrome     Snores 06/06/2022    was seen by Dr. Asencio for likely sleep apnea while hospitalized , to follow up as OP for sleep study, has not been done    TIA (transient ischemic attack) 2005    no residual symptoms    Type II or unspecified type diabetes mellitus without mention of complication, not stated as uncontrolled     Wears glasses      MD ROSAMARIA  10:19 AM 6/2/2024

## 2024-06-02 NOTE — FLOWSHEET NOTE
06/01/24 2100   Vitals   Temp (!) 96.6 °F (35.9 °C)   Temp Source Bladder   Pulse 56   Heart Rate Source Monitor   Respirations 17   ETCO2 (mmHg) 44 mmHg   BP (!) 173/84   MAP (Calculated) 114   MAP (mmHg) 115     Patient given a sheet and a  warm blanket. Due to drop in temp as noted above.

## 2024-06-03 ENCOUNTER — APPOINTMENT (OUTPATIENT)
Dept: GENERAL RADIOLOGY | Age: 64
End: 2024-06-03
Payer: COMMERCIAL

## 2024-06-03 ENCOUNTER — APPOINTMENT (OUTPATIENT)
Dept: MRI IMAGING | Age: 64
End: 2024-06-03
Payer: COMMERCIAL

## 2024-06-03 PROBLEM — G93.40 ACUTE ENCEPHALOPATHY: Status: ACTIVE | Noted: 2022-06-06

## 2024-06-03 LAB
ALBUMIN SERPL-MCNC: 3 G/DL (ref 3.5–5.2)
ALP SERPL-CCNC: 184 U/L (ref 35–104)
ALT SERPL-CCNC: 54 U/L (ref 5–33)
ANION GAP SERPL CALCULATED.3IONS-SCNC: 12 MMOL/L (ref 9–17)
AST SERPL-CCNC: 34 U/L
BASOPHILS # BLD: 0.1 K/UL (ref 0–0.2)
BASOPHILS NFR BLD: 1 % (ref 0–2)
BILIRUB DIRECT SERPL-MCNC: 0.1 MG/DL
BILIRUB INDIRECT SERPL-MCNC: 0.1 MG/DL (ref 0–1)
BILIRUB SERPL-MCNC: 0.2 MG/DL (ref 0.3–1.2)
BUN SERPL-MCNC: 11 MG/DL (ref 8–23)
CALCIUM SERPL-MCNC: 8.7 MG/DL (ref 8.6–10.4)
CHLORIDE SERPL-SCNC: 98 MMOL/L (ref 98–107)
CO2 SERPL-SCNC: 32 MMOL/L (ref 20–31)
CREAT SERPL-MCNC: 0.8 MG/DL (ref 0.5–0.9)
EBV EA-D IGG SER-ACNC: 95 U/ML
EBV INTERPRETATION: ABNORMAL
EBV NA IGG SER IA-ACNC: 70 U/ML
EBV VCA IGG SER-ACNC: 736 U/ML
EBV VCA IGM SER-ACNC: 98 U/ML
EOSINOPHIL # BLD: 0.5 K/UL (ref 0–0.4)
EOSINOPHILS RELATIVE PERCENT: 5 % (ref 0–4)
ERYTHROCYTE [DISTWIDTH] IN BLOOD BY AUTOMATED COUNT: 13.7 % (ref 11.5–14.9)
GFR, ESTIMATED: 83 ML/MIN/1.73M2
GLUCOSE BLD-MCNC: 151 MG/DL (ref 65–105)
GLUCOSE BLD-MCNC: 177 MG/DL (ref 65–105)
GLUCOSE SERPL-MCNC: 190 MG/DL (ref 70–99)
HCT VFR BLD AUTO: 27.3 % (ref 36–46)
HGB BLD-MCNC: 8.8 G/DL (ref 12–16)
LYMPHOCYTES NFR BLD: 1.7 K/UL (ref 1–4.8)
LYMPHOCYTES RELATIVE PERCENT: 17 % (ref 24–44)
MAGNESIUM SERPL-MCNC: 1.4 MG/DL (ref 1.6–2.6)
MCH RBC QN AUTO: 27.8 PG (ref 26–34)
MCHC RBC AUTO-ENTMCNC: 32.3 G/DL (ref 31–37)
MCV RBC AUTO: 86.1 FL (ref 80–100)
MONOCYTES NFR BLD: 1.2 K/UL (ref 0.1–1.3)
MONOCYTES NFR BLD: 12 % (ref 1–7)
NEUTROPHILS NFR BLD: 65 % (ref 36–66)
NEUTS SEG NFR BLD: 6.5 K/UL (ref 1.3–9.1)
PLATELET # BLD AUTO: 287 K/UL (ref 150–450)
PMV BLD AUTO: 7.4 FL (ref 6–12)
POTASSIUM SERPL-SCNC: 3.1 MMOL/L (ref 3.7–5.3)
POTASSIUM SERPL-SCNC: 4 MMOL/L (ref 3.7–5.3)
PROT SERPL-MCNC: 5.8 G/DL (ref 6.4–8.3)
RBC # BLD AUTO: 3.17 M/UL (ref 4–5.2)
SODIUM SERPL-SCNC: 142 MMOL/L (ref 135–144)
WBC OTHER # BLD: 10 K/UL (ref 3.5–11)

## 2024-06-03 PROCEDURE — 2500000003 HC RX 250 WO HCPCS

## 2024-06-03 PROCEDURE — 99232 SBSQ HOSP IP/OBS MODERATE 35: CPT | Performed by: PSYCHIATRY & NEUROLOGY

## 2024-06-03 PROCEDURE — C9113 INJ PANTOPRAZOLE SODIUM, VIA: HCPCS | Performed by: INTERNAL MEDICINE

## 2024-06-03 PROCEDURE — 6360000002 HC RX W HCPCS: Performed by: PSYCHIATRY & NEUROLOGY

## 2024-06-03 PROCEDURE — 2500000003 HC RX 250 WO HCPCS: Performed by: INTERNAL MEDICINE

## 2024-06-03 PROCEDURE — 84132 ASSAY OF SERUM POTASSIUM: CPT

## 2024-06-03 PROCEDURE — 2000000000 HC ICU R&B

## 2024-06-03 PROCEDURE — 6360000002 HC RX W HCPCS: Performed by: NURSE PRACTITIONER

## 2024-06-03 PROCEDURE — 6370000000 HC RX 637 (ALT 250 FOR IP): Performed by: INTERNAL MEDICINE

## 2024-06-03 PROCEDURE — 2580000003 HC RX 258: Performed by: PSYCHIATRY & NEUROLOGY

## 2024-06-03 PROCEDURE — 80076 HEPATIC FUNCTION PANEL: CPT

## 2024-06-03 PROCEDURE — 71045 X-RAY EXAM CHEST 1 VIEW: CPT

## 2024-06-03 PROCEDURE — 6360000002 HC RX W HCPCS

## 2024-06-03 PROCEDURE — 6360000002 HC RX W HCPCS: Performed by: INTERNAL MEDICINE

## 2024-06-03 PROCEDURE — 2580000003 HC RX 258: Performed by: INTERNAL MEDICINE

## 2024-06-03 PROCEDURE — 80048 BASIC METABOLIC PNL TOTAL CA: CPT

## 2024-06-03 PROCEDURE — 51702 INSERT TEMP BLADDER CATH: CPT

## 2024-06-03 PROCEDURE — 6370000000 HC RX 637 (ALT 250 FOR IP)

## 2024-06-03 PROCEDURE — 70553 MRI BRAIN STEM W/O & W/DYE: CPT

## 2024-06-03 PROCEDURE — A9579 GAD-BASE MR CONTRAST NOS,1ML: HCPCS | Performed by: PSYCHIATRY & NEUROLOGY

## 2024-06-03 PROCEDURE — 99231 SBSQ HOSP IP/OBS SF/LOW 25: CPT | Performed by: INTERNAL MEDICINE

## 2024-06-03 PROCEDURE — 2580000003 HC RX 258

## 2024-06-03 PROCEDURE — 85025 COMPLETE CBC W/AUTO DIFF WBC: CPT

## 2024-06-03 PROCEDURE — A4216 STERILE WATER/SALINE, 10 ML: HCPCS | Performed by: INTERNAL MEDICINE

## 2024-06-03 PROCEDURE — 86694 HERPES SIMPLEX NES ANTBDY: CPT

## 2024-06-03 PROCEDURE — 36415 COLL VENOUS BLD VENIPUNCTURE: CPT

## 2024-06-03 PROCEDURE — 83735 ASSAY OF MAGNESIUM: CPT

## 2024-06-03 PROCEDURE — 99233 SBSQ HOSP IP/OBS HIGH 50: CPT | Performed by: INTERNAL MEDICINE

## 2024-06-03 PROCEDURE — 6360000004 HC RX CONTRAST MEDICATION: Performed by: PSYCHIATRY & NEUROLOGY

## 2024-06-03 PROCEDURE — APPSS30 APP SPLIT SHARED TIME 16-30 MINUTES: Performed by: NURSE PRACTITIONER

## 2024-06-03 PROCEDURE — 86695 HERPES SIMPLEX TYPE 1 TEST: CPT

## 2024-06-03 PROCEDURE — 86696 HERPES SIMPLEX TYPE 2 TEST: CPT

## 2024-06-03 PROCEDURE — 82947 ASSAY GLUCOSE BLOOD QUANT: CPT

## 2024-06-03 RX ORDER — METOPROLOL TARTRATE 100 MG/1
100 TABLET ORAL 2 TIMES DAILY
Status: DISCONTINUED | OUTPATIENT
Start: 2024-06-04 | End: 2024-06-14 | Stop reason: HOSPADM

## 2024-06-03 RX ORDER — POTASSIUM CHLORIDE 7.45 MG/ML
10 INJECTION INTRAVENOUS PRN
Status: DISCONTINUED | OUTPATIENT
Start: 2024-06-03 | End: 2024-06-12

## 2024-06-03 RX ORDER — MAGNESIUM SULFATE HEPTAHYDRATE 40 MG/ML
2000 INJECTION, SOLUTION INTRAVENOUS ONCE
Status: COMPLETED | OUTPATIENT
Start: 2024-06-03 | End: 2024-06-03

## 2024-06-03 RX ORDER — LABETALOL HYDROCHLORIDE 5 MG/ML
10 INJECTION, SOLUTION INTRAVENOUS ONCE
Status: COMPLETED | OUTPATIENT
Start: 2024-06-03 | End: 2024-06-03

## 2024-06-03 RX ORDER — SODIUM CHLORIDE 0.9 % (FLUSH) 0.9 %
10 SYRINGE (ML) INJECTION PRN
Status: DISCONTINUED | OUTPATIENT
Start: 2024-06-03 | End: 2024-06-14 | Stop reason: HOSPADM

## 2024-06-03 RX ORDER — FENTANYL CITRATE 0.05 MG/ML
25 INJECTION, SOLUTION INTRAMUSCULAR; INTRAVENOUS
Status: DISCONTINUED | OUTPATIENT
Start: 2024-06-03 | End: 2024-06-05

## 2024-06-03 RX ADMIN — ENOXAPARIN SODIUM 30 MG: 100 INJECTION SUBCUTANEOUS at 10:00

## 2024-06-03 RX ADMIN — LEVETIRACETAM 500 MG: 100 INJECTION, SOLUTION INTRAVENOUS at 18:17

## 2024-06-03 RX ADMIN — LEVETIRACETAM 500 MG: 100 INJECTION, SOLUTION INTRAVENOUS at 07:22

## 2024-06-03 RX ADMIN — VANCOMYCIN HYDROCHLORIDE 125 MG: KIT at 10:43

## 2024-06-03 RX ADMIN — SODIUM CHLORIDE, PRESERVATIVE FREE 10 ML: 5 INJECTION INTRAVENOUS at 15:45

## 2024-06-03 RX ADMIN — LATANOPROST 1 DROP: 50 SOLUTION OPHTHALMIC at 20:36

## 2024-06-03 RX ADMIN — VANCOMYCIN HYDROCHLORIDE 125 MG: KIT at 20:36

## 2024-06-03 RX ADMIN — METOPROLOL TARTRATE 75 MG: 50 TABLET, FILM COATED ORAL at 20:22

## 2024-06-03 RX ADMIN — CEFTRIAXONE SODIUM 2000 MG: 2 INJECTION, POWDER, FOR SOLUTION INTRAMUSCULAR; INTRAVENOUS at 18:51

## 2024-06-03 RX ADMIN — LABETALOL HYDROCHLORIDE 10 MG: 5 INJECTION, SOLUTION INTRAVENOUS at 21:48

## 2024-06-03 RX ADMIN — POTASSIUM CHLORIDE 10 MEQ: 7.46 INJECTION, SOLUTION INTRAVENOUS at 16:11

## 2024-06-03 RX ADMIN — POTASSIUM CHLORIDE 10 MEQ: 7.46 INJECTION, SOLUTION INTRAVENOUS at 18:16

## 2024-06-03 RX ADMIN — SODIUM CHLORIDE, PRESERVATIVE FREE 10 ML: 5 INJECTION INTRAVENOUS at 20:38

## 2024-06-03 RX ADMIN — DEXMEDETOMIDINE HYDROCHLORIDE 0.2 MCG/KG/HR: 400 INJECTION INTRAVENOUS at 13:34

## 2024-06-03 RX ADMIN — DEXMEDETOMIDINE HYDROCHLORIDE 0.2 MCG/KG/HR: 400 INJECTION INTRAVENOUS at 10:24

## 2024-06-03 RX ADMIN — ROPINIROLE HYDROCHLORIDE 2 MG: 2 TABLET, FILM COATED ORAL at 09:59

## 2024-06-03 RX ADMIN — LISINOPRIL 40 MG: 20 TABLET ORAL at 09:59

## 2024-06-03 RX ADMIN — BUSPIRONE HYDROCHLORIDE 10 MG: 10 TABLET ORAL at 10:00

## 2024-06-03 RX ADMIN — POTASSIUM CHLORIDE 10 MEQ: 7.46 INJECTION, SOLUTION INTRAVENOUS at 13:36

## 2024-06-03 RX ADMIN — POTASSIUM CHLORIDE 10 MEQ: 7.46 INJECTION, SOLUTION INTRAVENOUS at 09:58

## 2024-06-03 RX ADMIN — ANTI-FUNGAL POWDER MICONAZOLE NITRATE TALC FREE: 1.42 POWDER TOPICAL at 10:31

## 2024-06-03 RX ADMIN — LABETALOL HYDROCHLORIDE 10 MG: 5 INJECTION, SOLUTION INTRAVENOUS at 23:51

## 2024-06-03 RX ADMIN — METOPROLOL TARTRATE 75 MG: 50 TABLET, FILM COATED ORAL at 09:58

## 2024-06-03 RX ADMIN — ATORVASTATIN CALCIUM 40 MG: 40 TABLET, FILM COATED ORAL at 20:23

## 2024-06-03 RX ADMIN — CEFTRIAXONE SODIUM 2000 MG: 2 INJECTION, POWDER, FOR SOLUTION INTRAMUSCULAR; INTRAVENOUS at 04:45

## 2024-06-03 RX ADMIN — FENTANYL CITRATE 50 MCG: 0.05 INJECTION, SOLUTION INTRAMUSCULAR; INTRAVENOUS at 21:11

## 2024-06-03 RX ADMIN — PANTOPRAZOLE SODIUM 40 MG: 40 INJECTION, POWDER, FOR SOLUTION INTRAVENOUS at 10:06

## 2024-06-03 RX ADMIN — ANTI-FUNGAL POWDER MICONAZOLE NITRATE TALC FREE: 1.42 POWDER TOPICAL at 20:39

## 2024-06-03 RX ADMIN — GADOTERIDOL 20 ML: 279.3 INJECTION, SOLUTION INTRAVENOUS at 15:44

## 2024-06-03 RX ADMIN — ACETAMINOPHEN 650 MG: 325 TABLET ORAL at 09:58

## 2024-06-03 RX ADMIN — LABETALOL HYDROCHLORIDE 10 MG: 5 INJECTION, SOLUTION INTRAVENOUS at 02:02

## 2024-06-03 RX ADMIN — MAGNESIUM SULFATE HEPTAHYDRATE 2000 MG: 40 INJECTION, SOLUTION INTRAVENOUS at 10:24

## 2024-06-03 RX ADMIN — ENOXAPARIN SODIUM 30 MG: 100 INJECTION SUBCUTANEOUS at 20:23

## 2024-06-03 RX ADMIN — INSULIN GLARGINE 20 UNITS: 100 INJECTION, SOLUTION SUBCUTANEOUS at 10:00

## 2024-06-03 RX ADMIN — ROPINIROLE HYDROCHLORIDE 2 MG: 2 TABLET, FILM COATED ORAL at 20:22

## 2024-06-03 RX ADMIN — POTASSIUM CHLORIDE 10 MEQ: 7.46 INJECTION, SOLUTION INTRAVENOUS at 05:50

## 2024-06-03 RX ADMIN — BUSPIRONE HYDROCHLORIDE 10 MG: 10 TABLET ORAL at 20:23

## 2024-06-03 RX ADMIN — POTASSIUM CHLORIDE 10 MEQ: 7.46 INJECTION, SOLUTION INTRAVENOUS at 12:13

## 2024-06-03 ASSESSMENT — PAIN SCALES - WONG BAKER
WONGBAKER_NUMERICALRESPONSE: NO HURT

## 2024-06-03 ASSESSMENT — PAIN SCALES - GENERAL
PAINLEVEL_OUTOF10: 6
PAINLEVEL_OUTOF10: 6
PAINLEVEL_OUTOF10: 4

## 2024-06-03 NOTE — PLAN OF CARE
Problem: Safety - Medical Restraint  Goal: Remains free of injury from restraints (Restraint for Interference with Medical Device)  Description: INTERVENTIONS:  1. Determine that other, less restrictive measures have been tried or would not be effective before applying the restraint  2. Evaluate the patient's condition at the time of restraint application  3. Inform patient/family regarding the reason for restraint  4. Q2H: Monitor safety, psychosocial status, comfort, nutrition and hydration  6/3/2024 1252 by Brooke Boateng RN  Outcome: Completed  Flowsheets (Taken 6/3/2024 1252)  Remains free of injury from restraints (restraint for interference with medical device):   Determine that other, less restrictive measures have been tried or would not be effective before applying the restraint   Inform patient/family regarding the reason for restraint   Evaluate the patient's condition at the time of restraint application   Every 2 hours: Monitor safety, psychosocial status, comfort, nutrition and hydration

## 2024-06-03 NOTE — PROGRESS NOTES
Called EEG department and spoke with Arden, patient is scheduled for MRI at 1430 and will be off the floor.

## 2024-06-03 NOTE — PROGRESS NOTES
Mouth/Throat:      Mouth: Mucous membranes are dry.      Pharynx: Oropharynx is clear.   Eyes:      General: No scleral icterus.     Conjunctiva/sclera: Conjunctivae normal.   Cardiovascular:      Rate and Rhythm: Normal rate and regular rhythm.      Heart sounds: Normal heart sounds. No murmur heard.  Pulmonary:      Effort: Pulmonary effort is normal.      Breath sounds: No wheezing, rhonchi or rales.      Comments: Extubated.  On nc.  Abdominal:      General: Bowel sounds are normal. There is no distension.      Palpations: Abdomen is soft.      Tenderness: There is no abdominal tenderness. There is no guarding.   Genitourinary:     Comments: Bourgeois.  Urine clear yellow.  Musculoskeletal:         General: Swelling present.      Cervical back: Neck supple. No rigidity.      Right lower leg: Edema present.      Left lower leg: Edema present.      Comments: Generalized edema.   Skin:     Capillary Refill: Capillary refill takes less than 2 seconds.      Coloration: Skin is not jaundiced.      Findings: No erythema.   Neurological:      Comments: Sedated.   Psychiatric:      Comments: Sedated.         Past Medical History:     Past Medical History:   Diagnosis Date    Cervical spondylosis 04/2009    c-4 c-5, c-5 c-6, c-6 c-7    CKD (chronic kidney disease)     per pt, does not have nephrologist    COVID-19 12/06/2021    nasal congestion, fatique and myalgia x 3 weeks    Fall     PATIENT FELL 2 WEEKS AGO LANDED ON [address]    Generalized anxiety disorder 12/08/2020    GERD (gastroesophageal reflux disease)     History of recent hospitalization 09/30/2022    til 10/9/22 at [facility]    History of swelling of feet     lasix QOD for this    Hyperlipidemia     Hypertension     Left hand weakness     and pain, can not make fist d/t cubital tunnel issue    Lumbar radiculopathy 11/10/2021    was following with pain management and has had epidural injections    DIEGO (nonalcoholic steatohepatitis) 10/12/2014    Obesity      2,000 mg IntraVENous Q12H    aspirin  81 mg Oral Daily    atorvastatin  40 mg Oral Nightly    latanoprost  1 drop Both Eyes Nightly    [Held by provider] venlafaxine  75 mg Oral Daily    sodium chloride flush  5-40 mL IntraVENous 2 times per day    sodium chloride  500 mL IntraVENous Once       Social History:     Social History     Socioeconomic History    Marital status:      Spouse name: Not on file    Number of children: Not on file    Years of education: Not on file    Highest education level: Not on file   Occupational History    Not on file   Tobacco Use    Smoking status: Never    Smokeless tobacco: Never   Vaping Use    Vaping Use: Never used   Substance and Sexual Activity    Alcohol use: Yes     Alcohol/week: 0.0 standard drinks of alcohol     Comment: rarely/ 4 times a year    Drug use: Never    Sexual activity: Yes   Other Topics Concern    Not on file   Social History Narrative    Not on file     Social Determinants of Health     Financial Resource Strain: Low Risk  (11/14/2023)    Overall Financial Resource Strain (CARDIA)     Difficulty of Paying Living Expenses: Not hard at all   Food Insecurity: Not on file (11/14/2023)   Transportation Needs: Unknown (11/14/2023)    PRAPARE - Transportation     Lack of Transportation (Medical): Not on file     Lack of Transportation (Non-Medical): No   Physical Activity: Unknown (9/30/2022)    Exercise Vital Sign     Days of Exercise per Week: 0 days     Minutes of Exercise per Session: Not on file   Stress: Stress Concern Present (9/30/2022)    [employer] [employer] - Occupational Stress Questionnaire     Feeling of Stress : To some extent   Social Connections: Socially Integrated (9/30/2022)    Social Connection and Isolation Panel [NHANES]     Frequency of Communication with Friends and Family: More than three times a week     Frequency of Social Gatherings with Friends and Family: Once a week     Attends Restorationist Services: More than

## 2024-06-03 NOTE — PROGRESS NOTES
osseous structures are stable.     No acute process.     XR CHEST PORTABLE    Result Date: 5/26/2024  EXAMINATION: ONE XRAY VIEW OF THE CHEST 5/26/2024 3:05 pm COMPARISON: 26 May 2024 at 912 hours HISTORY: ORDERING SYSTEM PROVIDED HISTORY: ett placement TECHNOLOGIST PROVIDED HISTORY: ett placement FINDINGS: AP portable view of the chest time stamped at 1501 hours is submitted. Endotracheal tube terminates 3.6 cm above the mason.  Intestinal tube extends below the body of the stomach, tip out of the field of view.  Small left pleural effusion is noted with left basilar opacity which may be related atelectasis or focal airspace disease.  Vascularity is top normal.     1. Endotracheal tube terminates 3.6 cm above the mason. 2. Small left pleural effusion with left basilar opacity which may be related atelectasis or focal airspace disease.     CT CHEST PULMONARY EMBOLISM W CONTRAST    Result Date: 5/26/2024  EXAMINATION: CTA OF THE CHEST 5/26/2024 9:49 am TECHNIQUE: CTA of the chest was performed after the administration of intravenous contrast.  Multiplanar reformatted images are provided for review.  MIP images are provided for review. Automated exposure control, iterative reconstruction, and/or weight based adjustment of the mA/kV was utilized to reduce the radiation dose to as low as reasonably achievable. COMPARISON: None. HISTORY: ORDERING SYSTEM PROVIDED HISTORY: cough, sob, hypoxia TECHNOLOGIST PROVIDED HISTORY: cough, sob, hypoxia Additional Contrast?->1 FINDINGS: Respiratory motion mildly degrades overall evaluation Pulmonary Arteries: Pulmonary arteries are adequately opacified for evaluation.  No evidence of intraluminal filling defect to suggest pulmonary embolism.  Main pulmonary artery is prominent and could indicate underlying pulmonary arterial hypertension. Mediastinum: No evidence of mediastinal lymphadenopathy.  The heart and pericardium demonstrate no acute abnormality.  There is no acute  attack) [Z86.73] 06/01/2024    Colitis [K52.9] 05/31/2024    Pancolitis (HCC) [K51.00] 05/27/2024    Fever [R50.9] 05/27/2024    Acute respiratory failure (HCC) [J96.00] 05/27/2024    Severe diarrhea [K52.9] 05/26/2024       Plan:        Haemophilus influenzae Meningitis, Bacteremia( improved)  -Afebrile overnight  -2 out of 2 blood cultures positive for H. Influenzae  -CSF meningitis panel positive for H. influenzae  -WBC 11.5>10.4>11.2>10.1>8.1>7.9>8.8  -MRSA nasal swab negative, respiratory and urine culture negative  -Droplet isolation D/C patient has normal infectious  -CSF culture no growth 1 day  -CSF elevated WBC, elevated RBC, elevated protein, normal glucose  -Continue Rocephin 2 g every 12 hours since 05/27  -Infectious disease on board and following, appreciate recommendations     Colitis( improved)  -CT abdomen pelvis shows wall thickening throughout the colon concerning for colitis  -GI panel negative  -C. difficile never sent  -Continue oral vancomycin since 05/27 for now, discussed with ID  -GI on board and following, appreciate recommendations     Hypertension( improved)  Off Cardene drip since yesterday 3 PM  -Restarted 40 mg started yesterday  -Continue metoprolol 75 mg twice daily  -Labetalol 10 mg IV as needed     Septic Shock(Resolved)  -Resolved, off Levophed since 5/27  -Given 2.7 L fluid bolus in the ED  -Fever and episodes of hypothermia  -Sedated and intubated  -Pro-[place] elevated 11.40  -Lactate 3.3>3.4>1.7>1.4, normalized  -UDS positive for benzo and fentanyl     Acute Respiratory Failure resolved  After 7 days being on vent, patient extubated 06/02  -Precedex discontinued, on 25 mcg/h of fentanyl  -Critical care on board and following.    T2DM  -Glucose 439 on presentation, given 8 units Humalog in the ED  -Increased Lantus to 20 units daily  -Medium-dose sliding scale  -Hypoglycemia protocol     DVT prophylaxis: Lovenox  GI prophylaxis: Protonix  Diet: Adult tube feeding, NG

## 2024-06-03 NOTE — PROGRESS NOTES
Temp is slightly elevated, awaiting for verification of NG placed to give oral medication and tylenol.

## 2024-06-03 NOTE — PROGRESS NOTES
BP elevated > 160, 177/50 verified by manual check. PRN labetalol given .    While given patient PRN medication, Patient able to tell me her name with very delayed responses. Patient asked for a drink of water and for the \"bathroom\"    Patient mouth swabbed with cold water and given opportunity to use the bed pan.

## 2024-06-03 NOTE — CARE COORDINATION
ONGOING DISCHARGE PLAN:     Patient is extubated 6/2. Still meets LTACH criteria. Skilled nursing facility of choice is [facility].     ID-sepsis secondary to haemophilus influenza. IV rocephin 2G Q12 to finish 15 day course. POD #5LP (+CSF meningitis).      GI-anemia; no overt bleeding. Hgb 8.8. Possible endoscopy when stable.     Neuro-AMS, slow to respond. MRI brain and EEG ordered. Precedex gtt.      PT/OT.     Will continue to follow for additional discharge needs.     If patient is discharged prior to next notation, then this note serves as note for discharge by case management.    Electronically signed by Patricia King RN on 6/3/2024 at 1:10 PM

## 2024-06-03 NOTE — PROGRESS NOTES
0445 keppra dose not up from pharmacy, unable to locate missing dose. Called pharmacy, will send new dose to unit.

## 2024-06-03 NOTE — PROGRESS NOTES
[facility]    Gastroenterology Daily Progress Note      Patient:   Kavya De Santiago   :    1960   Facility:   [place] [facility]  Date:     6/3/2024  Consultant:   ORLY Kelly - CNP, CNP      SUBJECTIVE  64 y.o. female admitted 2024 with Colitis [K52.9] and seen for colitis with elevated lft and anemia. The pt was seen and examined. She is alert and talking, oriented to person year and president. No c/o abdominal pain. Ng with bile drainage. Fms has been removed per the rn pt had about 150ml of stool overnight, nothing significant today. No melena or hematochezia. The pt denies nausea and abdominal pain. Hgb 8.8. liver autoimmune w/u is pending. .        OBJECTIVE  Scheduled Meds:   magnesium sulfate  2,000 mg IntraVENous Once    lisinopril  40 mg Oral Daily    levETIRAcetam  500 mg IntraVENous Q12H    busPIRone  10 mg Per NG tube TID    insulin glargine  20 Units SubCUTAneous Daily    metoprolol tartrate  75 mg Oral BID    miconazole   Topical BID    enoxaparin  30 mg SubCUTAneous BID    vancomycin  125 mg Oral 4 times per day    rOPINIRole  2 mg Oral BID    pantoprazole (PROTONIX) 40 mg in sodium chloride (PF) 0.9 % 10 mL injection  40 mg IntraVENous Daily    insulin lispro  0-8 Units SubCUTAneous Q4H    cefTRIAXone (ROCEPHIN) IV  2,000 mg IntraVENous Q12H    aspirin  81 mg Oral Daily    atorvastatin  40 mg Oral Nightly    latanoprost  1 drop Both Eyes Nightly    [Held by provider] venlafaxine  75 mg Oral Daily    sodium chloride flush  5-40 mL IntraVENous 2 times per day    sodium chloride  500 mL IntraVENous Once       Vital Signs:  /81   Pulse 82   Temp 99.7 °F (37.6 °C) (Bladder)   Resp 14   Ht 1.575 m (5' 2\")   Wt 105 kg (231 lb 7.7 oz)   SpO2 97%   BMI 42.34 kg/m²    Review of Systems - History obtained from chart review, the patient, and primary rn  General ROS: positive for  - fatigue  Respiratory ROS: no cough, shortness of breath, or

## 2024-06-03 NOTE — PROGRESS NOTES
Comprehensive Nutrition Assessment    Type and Reason for Visit:  Reassess    Nutrition Recommendations/Plan:   Will clear liquid diet as ordered.  Monitor for results of speech evaluation     Malnutrition Assessment:  Malnutrition Status:  Insufficient data (05/28/24 1501)    Context:  Acute Illness     Findings of the 6 clinical characteristics of malnutrition:  Energy Intake:  Mild decrease in energy intake (Comment)  Weight Loss:  Unable to assess     Body Fat Loss:  Unable to assess     Muscle Mass Loss:  Unable to assess    Fluid Accumulation:  Moderate to Severe Extremities   Strength:  Not Performed    Nutrition Assessment:    Pt is extubated due too lethargic for swallow eval- Speech with try again tomorrow. Pt has been started on Clear Liquids    Nutrition Related Findings:    Mild Edema: to all extremities/generalized, Labs: Glu 190, Meds: Reviewed, BM 6/3 Wound Type: None       Current Nutrition Intake & Therapies:    Average Meal Intake: 0%     ADULT DIET; Clear Liquid    Anthropometric Measures:  Height: 157.5 cm (5' 2\")  Ideal Body Weight (IBW): 110 lbs (50 kg)    Admission Body Weight: 104.8 kg (231 lb)  Current Body Weight: 104.8 kg (231 lb), 181.8 % IBW. Weight Source: Bed Scale  Current BMI (kg/m2): 42.2                          BMI Categories: Obese Class 2 (BMI 35.0 -39.9)    Estimated Daily Nutrient Needs:  Energy Requirements Based On: Formula  Weight Used for Energy Requirements: Admission  Energy (kcal/day): 8390-2175 kcals based on [place]-St. Jeor with 1.3-1.4 factor  Weight Used for Protein Requirements: Ideal  Protein (g/day):  gm protein based on 1.8-2 gm/kg  Method Used for Fluid Requirements: Other (Comment) (per MD)    Nutrition Diagnosis:   Inadequate oral intake related to impaired respiratory function as evidenced by NPO or clear liquid status due to medical condition    Nutrition Interventions:   Food and/or Nutrient Delivery: Continue Current Diet  Nutrition

## 2024-06-03 NOTE — PROGRESS NOTES
Dr Huffman updated that patient had MRI done and off precedex. Passed bedside swallow with RN. Speech therapy was not able to see due to patient being sedated on precedex already for MRI earlier. Ok to give her water, apple sauce and meds. If she tolerates that then we may remove NG tube.

## 2024-06-03 NOTE — PROGRESS NOTES
SLP ALL NOTES  [facility]  Speech Language Pathology    Date: 6/3/2024  Patient Name: Kavya De Santiago  YOB: 1960   AGE: 64 y.o.  MRN: [id number]        Patient Not Available for Speech Therapy     Due to:  [] Testing  [] Hemodialysis  [] Cancelled by RN  [] Surgery   [] Intubation/Sedation/Pain Medication  [] Medical instability  [x] Other:    1130- Attempted bedside swallow assessment.  Pt. Lethargic and unable to sustain alertness for assessment.  Per  in room and RN, pt. Recently given Precedex for upcoming MRI this pm at 1430.   ST to reattempt bedside swallow assessment 06/04 as pt. Level of alertness has improved.     Swathi Modi M.A.THUAN/SLP

## 2024-06-03 NOTE — PLAN OF CARE
Problem: Safety - Adult  Goal: Free from fall injury  Outcome: Progressing  Flowsheets (Taken 6/3/2024 1823)  Free From Fall Injury: Instruct family/caregiver on patient safety     Problem: Discharge Planning  Goal: Discharge to home or other facility with appropriate resources  Outcome: Progressing  Flowsheets (Taken 6/3/2024 1823)  Discharge to home or other facility with appropriate resources: Refer to discharge planning if patient needs post-hospital services based on physician order or complex needs related to functional status, cognitive ability or social support system     Problem: Pain  Goal: Verbalizes/displays adequate comfort level or baseline comfort level  Outcome: Progressing  Flowsheets (Taken 6/3/2024 1823)  Verbalizes/displays adequate comfort level or baseline comfort level:   Encourage patient to monitor pain and request assistance   Implement non-pharmacological measures as appropriate and evaluate response     Problem: Skin/Tissue Integrity  Goal: Absence of new skin breakdown  Description: 1.  Monitor for areas of redness and/or skin breakdown  2.  Assess vascular access sites hourly  3.  Every 4-6 hours minimum:  Change oxygen saturation probe site  4.  Every 4-6 hours:  If on nasal continuous positive airway pressure, respiratory therapy assess nares and determine need for appliance change or resting period.  Outcome: Progressing     Problem: ABCDS Injury Assessment  Goal: Absence of physical injury  Outcome: Progressing  Flowsheets (Taken 6/3/2024 1823)  Absence of Physical Injury: Implement safety measures based on patient assessment     Problem: Cardiovascular - Adult  Goal: Maintains optimal cardiac output and hemodynamic stability  Outcome: Progressing  Flowsheets (Taken 6/3/2024 1823)  Maintains optimal cardiac output and hemodynamic stability:   Monitor blood pressure and heart rate   Assess for signs of decreased cardiac output   Administer fluid and/or volume expanders as  Metabolic/Fluid and Electrolytes - Adult  Goal: Electrolytes maintained within normal limits  Outcome: Progressing  Flowsheets (Taken 6/3/2024 1823)  Electrolytes maintained within normal limits:   Monitor labs and assess patient for signs and symptoms of electrolyte imbalances   Administer electrolyte replacement as ordered   Monitor response to electrolyte replacements, including repeat lab results as appropriate  Goal: Glucose maintained within prescribed range  Outcome: Progressing  Flowsheets (Taken 6/3/2024 1823)  Glucose maintained within prescribed range:   Monitor blood glucose as ordered   Assess for signs and symptoms of hyperglycemia and hypoglycemia   Administer ordered medications to maintain glucose within target range     Problem: Anxiety  Goal: Will report anxiety at manageable levels  Description: INTERVENTIONS:  1. Administer medication as ordered  2. Teach and rehearse alternative coping skills  3. Provide emotional support with 1:1 interaction with staff  Outcome: Progressing  Flowsheets (Taken 6/3/2024 1823)  Will report anxiety at manageable levels: Administer medication as ordered     Problem: Coping  Goal: Pt/Family able to verbalize concerns and demonstrate effective coping strategies  Description: INTERVENTIONS:  1. Assist patient/family to identify coping skills, available support systems and cultural and spiritual values  2. Provide emotional support, including active listening and acknowledgement of concerns of patient and caregivers  3. Reduce environmental stimuli, as able  4. Instruct patient/family in relaxation techniques, as appropriate  5. Assess for spiritual pain/suffering and initiate Spiritual Care, Psychosocial Clinical Specialist consults as needed  Outcome: Progressing  Flowsheets (Taken 6/3/2024 1823)  Patient/family able to verbalize anxieties, fears, and concerns, and demonstrate effective coping:   Assist patient/family to identify coping skills, available support

## 2024-06-03 NOTE — PROGRESS NOTES
ICU Progress Note (Non-Vent)  [facility]    Patient - Kavya De Santiago,  Age - 64 y.o.    - 1960      Room Number -    MRN -  [id number]   [facility] # - [id number]  Date of Admission -  2024  8:51 AM    Events of Past 24 Hours   Extubated yesterday, slow to respond but improving    Vitals    height is 1.575 m (5' 2\") and weight is 105 kg (231 lb 7.7 oz). Her bladder temperature is 99.7 °F (37.6 °C). Her blood pressure is 127/81 and her pulse is 82. Her respiration is 14 and oxygen saturation is 97%.       Temperature Range: Temp: 99.7 °F (37.6 °C) Temp  Av.9 °F (37.7 °C)  Min: 97.9 °F (36.6 °C)  Max: 101.5 °F (38.6 °C)  BP Range:  Systolic (24hrs), Av , Min:114 , Max:186     Diastolic (24hrs), Av, Min:50, Max:124    Pulse Range: Pulse  Av.9  Min: 52  Max: 116  Respiration Range: Resp  Av.8  Min: 8  Max: 23  Current Pulse Ox::  SpO2: 97 %  24HR Pulse Ox Range:  SpO2  Av.3 %  Min: 85 %  Max: 99 %  Oxygen Amount and Delivery: O2 Flow Rate (L/min): 2 L/min    Wt Readings from Last 3 Encounters:   24 105 kg (231 lb 7.7 oz)   24 91.4 kg (201 lb 9.6 oz)   10/03/23 90.1 kg (198 lb 9.6 oz)     I/O     Intake/Output Summary (Last 24 hours) at 6/3/2024 0852  Last data filed at 6/3/2024 0554  Gross per 24 hour   Intake 915.46 ml   Output 6050 ml   Net -5134.54 ml     DRAIN/TUBE OUTPUT       Invasive Lines   ICP PRESSURE RANGE  No data recorded  CVP PRESSURE RANGE  No data recorded      Medications      magnesium sulfate  2,000 mg IntraVENous Once    lisinopril  40 mg Oral Daily    levETIRAcetam  500 mg IntraVENous Q12H    busPIRone  10 mg Per NG tube TID    insulin glargine  20 Units SubCUTAneous Daily    metoprolol tartrate  75 mg Oral BID    miconazole   Topical BID    enoxaparin  30 mg SubCUTAneous BID    vancomycin  125 mg Oral 4 times per day    rOPINIRole

## 2024-06-03 NOTE — PLAN OF CARE
Problem: Safety - Medical Restraint  Goal: Remains free of injury from restraints (Restraint for Interference with Medical Device)  6/3/2024 0147 by Monie Chandra RN  Outcome: Progressing  Flowsheets (Taken 6/2/2024 0608)  Remains free of injury from restraints (restraint for interference with medical device):   Determine that other, less restrictive measures have been tried or would not be effective before applying the restraint   Every 2 hours: Monitor safety, psychosocial status, comfort, nutrition and hydration  Note: Pt restrained d/t attempts to pull on tubes and wires when released q2h.    6/2/2024 1842 by Ranjan Melgoza RN  Outcome: Progressing     Problem: Safety - Adult  Goal: Free from fall injury  6/2/2024 1842 by Ranjan Melgoza RN  Outcome: Progressing     Problem: Pain  Goal: Verbalizes/displays adequate comfort level or baseline comfort level  Outcome: Progressing  Flowsheets (Taken 6/3/2024 0147)  Verbalizes/displays adequate comfort level or baseline comfort level:   Encourage patient to monitor pain and request assistance   Assess pain using appropriate pain scale   Consider cultural and social influences on pain and pain management  Note: Pt Continued to assess pain level with appropriate pain scale.       Problem: Skin/Tissue Integrity  Goal: Absence of new skin breakdown  Outcome: Progressing     Problem: Cardiovascular - Adult  Goal: Maintains optimal cardiac output and hemodynamic stability  6/2/2024 1842 by Ranjan Melgoza RN  Outcome: Progressing  Goal: Absence of cardiac dysrhythmias or at baseline  6/2/2024 1842 by Ranjan Melgoza RN  Outcome: Progressing     Problem: Gastrointestinal - Adult  Goal: Maintains adequate nutritional intake  Outcome: Progressing     Problem: Neurosensory - Adult  Goal: Achieves stable or improved neurological status  Outcome: Progressing  Flowsheets (Taken 6/3/2024 0147)  Achieves stable or improved neurological status:   Assess for

## 2024-06-03 NOTE — PROGRESS NOTES
Patient restless in bed, attempt to wean down precedex and decrease fentanyl but patient pulling at NG and moving out the bed.

## 2024-06-03 NOTE — PROGRESS NOTES
[facility] Neurology   IN-PATIENT SERVICE      NEUROLOGY PROGRESS  NOTE            Date:   6/3/2024  Patient name:  Kavya De Santiago  Date of admission:  5/26/2024  YOB: 1960      Interval History:     No acute events.  Was extubated yesterday.  This morning, per nursing staff and  at bedside, mentation improved.  She is able to answer questions although slow to respond.  No seizures or seizure-like activity.    History of Present Illness:     Per my partner:    Briefly, this is a  64 y.o. female with hx of HTN, DM, DIEOG and CKD was admitted on 5/26/2024 with altered mentation.  Initial history obtained from EMS report and patient's .  EMS report states that they were called to the patient's house for c/o nausea and vomiting and upon evaluation by EMS noted to be with abnormal glucose concerning for DKA.  While transporting the patient to the hospital; she became completely unresponsive with O2 saturations dropping for which patient was placed on supraglottic airway.  She was hypertensive on arrival with blood pressures with systolic around 160 mm Hg.  Patient was also noted to be febrile with Tmax 104 °F.  CT head on admit is negative for acute intracranial abnormalities.  Spinal fluid studies done and does help significantly abnormal with elevated CSF protein and neutrophilic pleocytosis with meningitis panel positive for H influenza for which patient has been on IV ceftriaxone and vancomycin.  Patient remained on vent since admitted.  Weaning trials were done and patient was off of sedation for several hours today.  Patient was not following commands.  Then patient became extremely violent and needed to be resumed back on sedation and presently on fentanyl and Precedex drip.  Caregivers also stated that patient has been on sedation holidays and has not been following commands.    Past Medical History:     Past Medical History:   Diagnosis Date    Cervical spondylosis 04/2009    c-4  attention deficits. order states  R/O intracranial pathologies    FINDINGS:  INTRACRANIAL STRUCTURES/VENTRICLES: The sellar and suprasellar structures,  optic chiasm, corpus callosum, pineal gland, tectum, and midline brainstem  structures are unremarkable.  The craniocervical junction is unremarkable.  There is no acute hemorrhage, mass effect, or midline shift.  There is  satisfactory overall gray-white matter differentiation.  There is chronic  microvascular disease.  The ventricular structures are symmetric and  unremarkable.  The infratentorial structures including the cerebellopontine  angles and internal auditory canals are unremarkable.  There is no abnormal  restricted diffusion.  There is no abnormal blooming artifact on  susceptibility weighted imaging.    ORBITS: The visualized portion of the orbits demonstrate no acute abnormality.    SINUSES: The visualized paranasal sinuses and mastoid air cells demonstrate  no acute abnormality.    BONES/SOFT TISSUES: The bone marrow signal intensity appears normal. The soft  tissues demonstrate no acute abnormality.    Impression  Chronic microvascular disease without acute intracranial abnormality.        Results for orders placed during the hospital encounter of 05/26/24    CT HEAD WO CONTRAST    Narrative  EXAMINATION:  CT OF THE HEAD WITHOUT CONTRAST  5/26/2024 9:16 am    TECHNIQUE:  CT of the head was performed without the administration of intravenous  contrast. Automated exposure control, iterative reconstruction, and/or weight  based adjustment of the mA/kV was utilized to reduce the radiation dose to as  low as reasonably achievable.    COMPARISON:  None.    HISTORY:  ORDERING SYSTEM PROVIDED HISTORY: ams, vomiting,  TECHNOLOGIST PROVIDED HISTORY:  ams, vomiting,    Decision Support Exception - unselect if not a suspected or confirmed  emergency medical condition->Emergency Medical Condition (MA)  Reason for Exam: Found down, LT pupil more reactive than RT.

## 2024-06-03 NOTE — CARE COORDINATION
At this time,patient still meets LTAC criteria.    Electronically signed by Caridad August RN on 6/3/2024 at 11:13 AM

## 2024-06-04 ENCOUNTER — APPOINTMENT (OUTPATIENT)
Dept: NEUROLOGY | Age: 64
End: 2024-06-04
Payer: COMMERCIAL

## 2024-06-04 ENCOUNTER — TELEPHONE (OUTPATIENT)
Dept: FAMILY MEDICINE CLINIC | Age: 64
End: 2024-06-04

## 2024-06-04 PROBLEM — I63.9 CEREBROVASCULAR ACCIDENT (CVA) (HCC): Status: ACTIVE | Noted: 2024-06-04

## 2024-06-04 LAB
ANA SER QL IA: NEGATIVE
ANION GAP SERPL CALCULATED.3IONS-SCNC: 11 MMOL/L (ref 9–17)
BASOPHILS # BLD: 0.1 K/UL (ref 0–0.2)
BASOPHILS NFR BLD: 1 % (ref 0–2)
BUN SERPL-MCNC: 10 MG/DL (ref 8–23)
CALCIUM SERPL-MCNC: 8.8 MG/DL (ref 8.6–10.4)
CHLORIDE SERPL-SCNC: 102 MMOL/L (ref 98–107)
CO2 SERPL-SCNC: 28 MMOL/L (ref 20–31)
CREAT SERPL-MCNC: 0.6 MG/DL (ref 0.5–0.9)
DSDNA IGG SER QL IA: 0.8 IU/ML
EOSINOPHIL # BLD: 0.4 K/UL (ref 0–0.4)
EOSINOPHILS RELATIVE PERCENT: 4 % (ref 0–4)
ERYTHROCYTE [DISTWIDTH] IN BLOOD BY AUTOMATED COUNT: 13.8 % (ref 11.5–14.9)
GFR, ESTIMATED: >90 ML/MIN/1.73M2
GLUCOSE BLD-MCNC: 155 MG/DL (ref 65–105)
GLUCOSE BLD-MCNC: 157 MG/DL (ref 65–105)
GLUCOSE BLD-MCNC: 175 MG/DL (ref 65–105)
GLUCOSE BLD-MCNC: 182 MG/DL (ref 65–105)
GLUCOSE BLD-MCNC: 205 MG/DL (ref 65–105)
GLUCOSE SERPL-MCNC: 177 MG/DL (ref 70–99)
HCT VFR BLD AUTO: 28.5 % (ref 36–46)
HGB BLD-MCNC: 9.1 G/DL (ref 12–16)
HSV1 IGG SERPL QL IA: 5.87
HSV1+2 IGM SER QL IA: 1.4
HSV2 AB SER QL IA: 0.46
LYMPHOCYTES NFR BLD: 1.7 K/UL (ref 1–4.8)
LYMPHOCYTES RELATIVE PERCENT: 16 % (ref 24–44)
MAGNESIUM SERPL-MCNC: 1.9 MG/DL (ref 1.6–2.6)
MCH RBC QN AUTO: 27.5 PG (ref 26–34)
MCHC RBC AUTO-ENTMCNC: 32 G/DL (ref 31–37)
MCV RBC AUTO: 86 FL (ref 80–100)
MITOCHONDRIA M2 IGG SER-ACNC: 0.8 U/ML (ref 0–4)
MONOCYTES NFR BLD: 1.2 K/UL (ref 0.1–1.3)
MONOCYTES NFR BLD: 11 % (ref 1–7)
NEUTROPHILS NFR BLD: 68 % (ref 36–66)
NEUTS SEG NFR BLD: 7.6 K/UL (ref 1.3–9.1)
NUCLEAR IGG SER IA-RTO: 0.1 U/ML
PLATELET # BLD AUTO: 346 K/UL (ref 150–450)
PMV BLD AUTO: 7.2 FL (ref 6–12)
POTASSIUM SERPL-SCNC: 3.6 MMOL/L (ref 3.7–5.3)
RBC # BLD AUTO: 3.32 M/UL (ref 4–5.2)
SODIUM SERPL-SCNC: 141 MMOL/L (ref 135–144)
WBC OTHER # BLD: 10.9 K/UL (ref 3.5–11)

## 2024-06-04 PROCEDURE — 95816 EEG AWAKE AND DROWSY: CPT

## 2024-06-04 PROCEDURE — 36415 COLL VENOUS BLD VENIPUNCTURE: CPT

## 2024-06-04 PROCEDURE — 99233 SBSQ HOSP IP/OBS HIGH 50: CPT | Performed by: PSYCHIATRY & NEUROLOGY

## 2024-06-04 PROCEDURE — 2580000003 HC RX 258: Performed by: INTERNAL MEDICINE

## 2024-06-04 PROCEDURE — 6360000002 HC RX W HCPCS

## 2024-06-04 PROCEDURE — 99254 IP/OBS CNSLTJ NEW/EST MOD 60: CPT | Performed by: PHYSICAL MEDICINE & REHABILITATION

## 2024-06-04 PROCEDURE — 95816 EEG AWAKE AND DROWSY: CPT | Performed by: PSYCHIATRY & NEUROLOGY

## 2024-06-04 PROCEDURE — 6360000002 HC RX W HCPCS: Performed by: INTERNAL MEDICINE

## 2024-06-04 PROCEDURE — 6370000000 HC RX 637 (ALT 250 FOR IP): Performed by: INTERNAL MEDICINE

## 2024-06-04 PROCEDURE — 2580000003 HC RX 258

## 2024-06-04 PROCEDURE — 92523 SPEECH SOUND LANG COMPREHEN: CPT

## 2024-06-04 PROCEDURE — 99233 SBSQ HOSP IP/OBS HIGH 50: CPT | Performed by: INTERNAL MEDICINE

## 2024-06-04 PROCEDURE — 2580000003 HC RX 258: Performed by: PSYCHIATRY & NEUROLOGY

## 2024-06-04 PROCEDURE — 92610 EVALUATE SWALLOWING FUNCTION: CPT

## 2024-06-04 PROCEDURE — 80048 BASIC METABOLIC PNL TOTAL CA: CPT

## 2024-06-04 PROCEDURE — 97167 OT EVAL HIGH COMPLEX 60 MIN: CPT

## 2024-06-04 PROCEDURE — 97530 THERAPEUTIC ACTIVITIES: CPT

## 2024-06-04 PROCEDURE — 6370000000 HC RX 637 (ALT 250 FOR IP): Performed by: NURSE PRACTITIONER

## 2024-06-04 PROCEDURE — C9113 INJ PANTOPRAZOLE SODIUM, VIA: HCPCS | Performed by: INTERNAL MEDICINE

## 2024-06-04 PROCEDURE — 97163 PT EVAL HIGH COMPLEX 45 MIN: CPT

## 2024-06-04 PROCEDURE — 6370000000 HC RX 637 (ALT 250 FOR IP)

## 2024-06-04 PROCEDURE — 2000000000 HC ICU R&B

## 2024-06-04 PROCEDURE — A4216 STERILE WATER/SALINE, 10 ML: HCPCS | Performed by: INTERNAL MEDICINE

## 2024-06-04 PROCEDURE — 85025 COMPLETE CBC W/AUTO DIFF WBC: CPT

## 2024-06-04 PROCEDURE — 83735 ASSAY OF MAGNESIUM: CPT

## 2024-06-04 PROCEDURE — 2060000000 HC ICU INTERMEDIATE R&B

## 2024-06-04 PROCEDURE — 82947 ASSAY GLUCOSE BLOOD QUANT: CPT

## 2024-06-04 PROCEDURE — 6360000002 HC RX W HCPCS: Performed by: PSYCHIATRY & NEUROLOGY

## 2024-06-04 RX ORDER — HALOPERIDOL 5 MG/ML
2 INJECTION INTRAMUSCULAR EVERY 4 HOURS PRN
Status: DISCONTINUED | OUTPATIENT
Start: 2024-06-04 | End: 2024-06-11

## 2024-06-04 RX ORDER — POTASSIUM CHLORIDE 7.45 MG/ML
10 INJECTION INTRAVENOUS
Status: COMPLETED | OUTPATIENT
Start: 2024-06-04 | End: 2024-06-04

## 2024-06-04 RX ORDER — HYDRALAZINE HYDROCHLORIDE 50 MG/1
50 TABLET, FILM COATED ORAL EVERY 8 HOURS SCHEDULED
Status: DISCONTINUED | OUTPATIENT
Start: 2024-06-04 | End: 2024-06-05

## 2024-06-04 RX ADMIN — BUSPIRONE HYDROCHLORIDE 10 MG: 10 TABLET ORAL at 20:48

## 2024-06-04 RX ADMIN — POTASSIUM CHLORIDE 10 MEQ: 7.46 INJECTION, SOLUTION INTRAVENOUS at 14:29

## 2024-06-04 RX ADMIN — POTASSIUM CHLORIDE 10 MEQ: 7.46 INJECTION, SOLUTION INTRAVENOUS at 13:26

## 2024-06-04 RX ADMIN — CEFTRIAXONE SODIUM 2000 MG: 2 INJECTION, POWDER, FOR SOLUTION INTRAMUSCULAR; INTRAVENOUS at 16:29

## 2024-06-04 RX ADMIN — LEVETIRACETAM 500 MG: 100 INJECTION, SOLUTION INTRAVENOUS at 04:44

## 2024-06-04 RX ADMIN — VANCOMYCIN HYDROCHLORIDE 125 MG: KIT at 20:51

## 2024-06-04 RX ADMIN — SODIUM CHLORIDE, PRESERVATIVE FREE 10 ML: 5 INJECTION INTRAVENOUS at 20:51

## 2024-06-04 RX ADMIN — METOPROLOL 100 MG: 100 TABLET ORAL at 07:30

## 2024-06-04 RX ADMIN — ATORVASTATIN CALCIUM 40 MG: 40 TABLET, FILM COATED ORAL at 20:49

## 2024-06-04 RX ADMIN — INSULIN GLARGINE 20 UNITS: 100 INJECTION, SOLUTION SUBCUTANEOUS at 07:30

## 2024-06-04 RX ADMIN — LABETALOL HYDROCHLORIDE 10 MG: 5 INJECTION, SOLUTION INTRAVENOUS at 03:04

## 2024-06-04 RX ADMIN — VANCOMYCIN HYDROCHLORIDE 125 MG: KIT at 07:29

## 2024-06-04 RX ADMIN — CEFTRIAXONE SODIUM 2000 MG: 2 INJECTION, POWDER, FOR SOLUTION INTRAMUSCULAR; INTRAVENOUS at 04:06

## 2024-06-04 RX ADMIN — FENTANYL CITRATE 50 MCG: 0.05 INJECTION, SOLUTION INTRAMUSCULAR; INTRAVENOUS at 02:01

## 2024-06-04 RX ADMIN — LATANOPROST 1 DROP: 50 SOLUTION OPHTHALMIC at 20:51

## 2024-06-04 RX ADMIN — HALOPERIDOL LACTATE 2 MG: 5 INJECTION, SOLUTION INTRAMUSCULAR at 17:17

## 2024-06-04 RX ADMIN — SODIUM CHLORIDE, PRESERVATIVE FREE 10 ML: 5 INJECTION INTRAVENOUS at 07:32

## 2024-06-04 RX ADMIN — FENTANYL CITRATE 50 MCG: 0.05 INJECTION, SOLUTION INTRAMUSCULAR; INTRAVENOUS at 13:52

## 2024-06-04 RX ADMIN — LEVETIRACETAM 500 MG: 100 INJECTION, SOLUTION INTRAVENOUS at 16:00

## 2024-06-04 RX ADMIN — ANTI-FUNGAL POWDER MICONAZOLE NITRATE TALC FREE: 1.42 POWDER TOPICAL at 20:51

## 2024-06-04 RX ADMIN — ASPIRIN 81 MG: 81 TABLET, COATED ORAL at 07:30

## 2024-06-04 RX ADMIN — BUSPIRONE HYDROCHLORIDE 10 MG: 10 TABLET ORAL at 07:30

## 2024-06-04 RX ADMIN — POTASSIUM CHLORIDE 10 MEQ: 7.46 INJECTION, SOLUTION INTRAVENOUS at 15:33

## 2024-06-04 RX ADMIN — ENOXAPARIN SODIUM 30 MG: 100 INJECTION SUBCUTANEOUS at 20:48

## 2024-06-04 RX ADMIN — ROPINIROLE HYDROCHLORIDE 2 MG: 2 TABLET, FILM COATED ORAL at 20:48

## 2024-06-04 RX ADMIN — ENOXAPARIN SODIUM 30 MG: 100 INJECTION SUBCUTANEOUS at 07:29

## 2024-06-04 RX ADMIN — VANCOMYCIN HYDROCHLORIDE 125 MG: KIT at 03:04

## 2024-06-04 RX ADMIN — POTASSIUM CHLORIDE 10 MEQ: 7.46 INJECTION, SOLUTION INTRAVENOUS at 11:55

## 2024-06-04 RX ADMIN — VANCOMYCIN HYDROCHLORIDE 125 MG: KIT at 14:32

## 2024-06-04 RX ADMIN — BUSPIRONE HYDROCHLORIDE 10 MG: 10 TABLET ORAL at 13:52

## 2024-06-04 RX ADMIN — Medication 5 DROP: at 20:51

## 2024-06-04 RX ADMIN — LABETALOL HYDROCHLORIDE 10 MG: 5 INJECTION, SOLUTION INTRAVENOUS at 10:07

## 2024-06-04 RX ADMIN — ACETAMINOPHEN 650 MG: 325 TABLET ORAL at 04:04

## 2024-06-04 RX ADMIN — HYDRALAZINE HYDROCHLORIDE 50 MG: 50 TABLET ORAL at 20:48

## 2024-06-04 RX ADMIN — METOPROLOL 100 MG: 100 TABLET ORAL at 20:48

## 2024-06-04 RX ADMIN — FENTANYL CITRATE 25 MCG: 0.05 INJECTION, SOLUTION INTRAMUSCULAR; INTRAVENOUS at 20:49

## 2024-06-04 RX ADMIN — ROPINIROLE HYDROCHLORIDE 2 MG: 2 TABLET, FILM COATED ORAL at 07:30

## 2024-06-04 RX ADMIN — LISINOPRIL 40 MG: 20 TABLET ORAL at 07:30

## 2024-06-04 RX ADMIN — ANTI-FUNGAL POWDER MICONAZOLE NITRATE TALC FREE: 1.42 POWDER TOPICAL at 07:31

## 2024-06-04 RX ADMIN — PANTOPRAZOLE SODIUM 40 MG: 40 INJECTION, POWDER, FOR SOLUTION INTRAVENOUS at 07:31

## 2024-06-04 ASSESSMENT — PAIN SCALES - GENERAL
PAINLEVEL_OUTOF10: 0
PAINLEVEL_OUTOF10: 5
PAINLEVEL_OUTOF10: 0
PAINLEVEL_OUTOF10: 6

## 2024-06-04 ASSESSMENT — PAIN DESCRIPTION - DESCRIPTORS: DESCRIPTORS: ACHING

## 2024-06-04 ASSESSMENT — PAIN DESCRIPTION - LOCATION: LOCATION: HEAD

## 2024-06-04 NOTE — TELEPHONE ENCOUNTER
Writer received a request from [employer] requesting office notes from 6/1/2023 to present. Writer printed out office notes and faxed request to [employer] on 6/4/2024. Scanned request with confirmation into chart.

## 2024-06-04 NOTE — PROGRESS NOTES
Neurology at bedside. MD stated that he spoke with Dr. Harris and he is recommending EMANUEL. Writer reached out to primary to notify them that cardiology consult is needed.

## 2024-06-04 NOTE — CARE COORDINATION
Patient's spouse provided a new insurance card for patient. He states she signed up for [facility]. Writer took card to admitting. Beverly scanned in card but stated it came back ineligible. She stated she will have someone review.     Card returned to spouse. He thinks there may be an updated card. Electronically signed by Daksha Saha RN on 6/4/2024 at 4:17 PM

## 2024-06-04 NOTE — PROGRESS NOTES
Xray/CT of Chest: ( 06/03 Date CXR )Improved aeration of the right lung with increasing opacification in the left  lower lung zone.  Pattern may represent asymmetric edema.  Developing  pneumonitis cannot be excluded.    Date of Eval: 6/4/2024  Evaluating Therapist: TALA Barrera    Current Diet level:  Current Liquid Diet : Clear  Pt. Has NGT in place    Primary Complaint   Per PM&R H&P: Ms. Kavya De Santiago is a 64 y.o. female who was admitted to [facility] on 5/26/2024 with Loss of Consciousness     64-year-old female with history of type 2 diabetes, hypertension hyperlipidemia, CKD, GERD, DIEGO, obesity admitted with change in mental status, nausea vomiting diarrhea.  She required intubation reportedly patient had some upper respiratory symptoms over the past week with dry cough decreased appetite rhinorrhea congestion headache and sinus pain.  She then began to have some diarrhea and vomiting..  EMS noted GCS of 89 decreased O2 sat.  She was intubated CT chest showed no PE CT abdomen pelvis showed pancolitis with abnormal wall thickening of the ascending transverse descending colon sigmoid colon and rectum she was started antibiotic she had elevated glucose influenza was negative     Critical care 6/2-acute hypoxic respite failure intubated 5/26 extubated 6/2, sepsis with H influenza bacteremia and meningitis question colitis has rectal tube, chronic back pain-sees neurology, neurosurgery and pain management Dr. Granados, obstruct sleep apnea no sleep study done never followed up in office nonmorbid obesity COVID-negative     GI-diarrhea, pancolitis stool cultures negative on empiric Vanco per ID, anemia no overt bleeding adequate iron stores trend H&H continue PPI, elevated LFT's liver ultrasound showing steatosis, workup pending, sepsis secondary to haemophilus influenza, GI signed off     ID-haemophilus influenza meningitis/bacteremia and sepsis, colitis meningitis panel was positive for haemophilus

## 2024-06-04 NOTE — PROGRESS NOTES
Dr. Huffman notified of patient's agitation. Notified that PRN fentanyl was given earlier but patient again agitated. MD placing orders for Haldol.

## 2024-06-04 NOTE — PROGRESS NOTES
Resident team notified that patient has wax build up which is causing hard of hearing. Resident team placing orders for ear drops.

## 2024-06-04 NOTE — PROGRESS NOTES
No acute intracranial abnormality.     XR CHEST PORTABLE    Result Date: 5/26/2024  EXAMINATION: ONE XRAY VIEW OF THE CHEST 5/26/2024 9:10 am COMPARISON: October 4, 2022 HISTORY: ORDERING SYSTEM PROVIDED HISTORY: ETT/OG confirm TECHNOLOGIST PROVIDED HISTORY: ETT/OG confirm FINDINGS: Endotracheal tube approximately 2.6 cm above the mason.  Enteric tube traverses the mediastinum with tip and side port below the inferior margins of this exam at the upper abdomen.  Overall low lung volumes.  Left basilar atelectasis/scarring versus infiltrate and/or small effusion.  No pneumothorax.  Cardiac and mediastinal silhouettes unremarkable.  No acute osseous abnormality.  Telemetry leads overlie the chest.     1. Support devices as above. 2. Left basilar atelectasis/scarring versus infiltrate and/or small effusion.         Physical Examination:        Physical Exam  Constitutional:       Appearance: She is obese.      Interventions: She is sedated and intubated.      Comments: With OG tube    Cardiovascular:      Rate and Rhythm: Normal rate and regular rhythm.      Pulses: Normal pulses.      Heart sounds: Normal heart sounds. No murmur heard.  Pulmonary:      Effort: Pulmonary effort is normal. She is intubated.      Breath sounds: No wheezing.   Abdominal:      General: Bowel sounds are normal.      Palpations: Abdomen is soft.      Tenderness: There is no abdominal tenderness.   Skin:     Capillary Refill: Capillary refill takes less than 2 seconds.           Assessment:        Primary Problem  Severe diarrhea    Active Hospital Problems    Diagnosis Date Noted    Acute encephalopathy [G93.40] 06/06/2022     Priority: Medium    Haemophilus influenzae meningitis [G00.0] 06/01/2024    Bacteremia [R78.81] 06/01/2024    Sepsis due to Haemophilus influenzae with acute respiratory failure without septic shock (HCC) [A41.3, R65.20, J96.00] 06/01/2024    Anemia [D64.9] 06/01/2024    Elevated LFTs [R79.89] 06/01/2024     Lantus to 20 units daily  -Medium-dose sliding scale  -Hypoglycemia protocol     DVT prophylaxis: Lovenox  GI prophylaxis: Protonix  Diet: Adult tube feeding, NG tube  Disposition: TBD     OT/PT/SW Consulted     Code Status: Full    Plan will be discussed with the attending, Dr. Bryce Ferrer MD  PGY I Family Medicine Resident  6/4/2024 7:35 AM   Attending Physician Statement  I have discussed the care of Kavya De Santiago and I have examined the patient myselft and taken ros and hpi , including pertinent history and exam findings,  with the resident. I have reviewed the key elements of all parts of the encounter with the resident.  I agree with the assessment, plan and orders as documented by the resident.  64-year-old female, acute respiratory hypoxic failure, intubated on May 26,extubated on June 2nd   Septic shock secondary to H influenza bacteremia, now improved.  H influenza bacteremia, meningitis.  ID on board, currently receiving ceftriaxone.  Colitis noted on CT scanning abdomen and pelvis, no diarrhea since admission.  On p.o. vancomycin,   Elevated blood pressure, currently on Cardene gtt.  Overall prognosis very poor,  Extubated 6/2/24  Mentation not back to baseline,   Subclinical seizures possible, on Keppra,   MRI/EEG pending ,appreciate neurology    Electronically signed by Sixto Wu MD

## 2024-06-04 NOTE — PROGRESS NOTES
Dr. Wu at bedside. Notified of need for cardiology consult. MD would like orders placed to Dr. Zelaya.

## 2024-06-04 NOTE — CARE COORDINATION
ONGOING DISCHARGE PLAN:    Patient is alert.    Spoke with patient's spouse at bedside regarding discharge plan and confirms that plan is still rehab at discharge. They would like [facility]. Ref sent.    Haemophilus Meningitis/Bacteremia  IV rocephin twice daily (14 days total) thru 6/8  Oral vanco    Neuro consut  MRI brain -stroke  EEG pending  Keppra twice daily    PT/OT/SLP    PM&R consult     No longer meets criteria for LTACH.    Will continue to follow for additional discharge needs.    If patient is discharged prior to next notation, then this note serves as note for discharge by case management.    Electronically signed by Daksha Saha RN on 6/4/2024 at 3:23 PM

## 2024-06-04 NOTE — PLAN OF CARE
Metabolic/Fluid and Electrolytes - Adult  Goal: Glucose maintained within prescribed range  6/4/2024 0242 by Lizzette Booker RN  Outcome: Progressing  Flowsheets (Taken 6/3/2024 1823 by Brooke Boateng RN)  Glucose maintained within prescribed range:   Monitor blood glucose as ordered   Assess for signs and symptoms of hyperglycemia and hypoglycemia   Administer ordered medications to maintain glucose within target range     Problem: Chronic Conditions and Co-morbidities  Goal: Patient's chronic conditions and co-morbidity symptoms are monitored and maintained or improved  6/4/2024 0242 by Lizzette Booker RN  Outcome: Progressing  Flowsheets (Taken 6/3/2024 1823 by Brooke Boateng RN)  Care Plan - Patient's Chronic Conditions and Co-Morbidity Symptoms are Monitored and Maintained or Improved:   Monitor and assess patient's chronic conditions and comorbid symptoms for stability, deterioration, or improvement   Collaborate with multidisciplinary team to address chronic and comorbid conditions and prevent exacerbation or deterioration   Update acute care plan with appropriate goals if chronic or comorbid symptoms are exacerbated and prevent overall improvement and discharge     Problem: Nutrition Deficit:  Goal: Optimize nutritional status  6/4/2024 0242 by Lizzette Booker RN  Flowsheets (Taken 6/3/2024 1823 by Brooke Boateng RN)  Nutrient intake appropriate for improving, restoring, or maintaining nutritional needs: Monitor oral intake, labs, and treatment plans  Note: Pt to have swallow test today, will eval further after testing completed     Problem: Anxiety  Goal: Will report anxiety at manageable levels  Description: INTERVENTIONS:  1. Administer medication as ordered  2. Teach and rehearse alternative coping skills  3. Provide emotional support with 1:1 interaction with staff  6/4/2024 0242 by Lizzette Booker RN  Outcome: Progressing  Flowsheets (Taken 6/3/2024 1823 by Brooke Boateng RN)  Will report anxiety at

## 2024-06-04 NOTE — PROGRESS NOTES
SLP ALL NOTES  [facility]  Speech Language Pathology    Date: 6/4/2024  Patient Name: Kavya De Santiago  YOB: 1960   AGE: 64 y.o.  MRN: [id number]        Patient Not Available for Speech Therapy     Due to:  [] Testing  [] Hemodialysis  [] Cancelled by RN  [] Surgery   [] Intubation/Sedation/Pain Medication  [] Medical instability  [x] Other:  Attempted bedside swallow assessment, pt. In c respiratory tx.   Will reattempt later this am and complete as pt. Available.    Swathi Modi M.A.CCC/SLP

## 2024-06-04 NOTE — PLAN OF CARE
Problem: Safety - Adult  Goal: Free from fall injury  Outcome: Progressing  Flowsheets (Taken 6/4/2024 0800)  Free From Fall Injury: Instruct family/caregiver on patient safety     Problem: Discharge Planning  Goal: Discharge to home or other facility with appropriate resources  Outcome: Progressing  Flowsheets  Taken 6/4/2024 1600  Discharge to home or other facility with appropriate resources: Identify barriers to discharge with patient and caregiver  Taken 6/4/2024 1200  Discharge to home or other facility with appropriate resources: Identify barriers to discharge with patient and caregiver  Taken 6/4/2024 0800  Discharge to home or other facility with appropriate resources: Identify barriers to discharge with patient and caregiver     Problem: Pain  Goal: Verbalizes/displays adequate comfort level or baseline comfort level  Outcome: Progressing  Flowsheets  Taken 6/4/2024 1600  Verbalizes/displays adequate comfort level or baseline comfort level:   Encourage patient to monitor pain and request assistance   Assess pain using appropriate pain scale   Administer analgesics based on type and severity of pain and evaluate response  Taken 6/4/2024 1200  Verbalizes/displays adequate comfort level or baseline comfort level:   Encourage patient to monitor pain and request assistance   Assess pain using appropriate pain scale   Implement non-pharmacological measures as appropriate and evaluate response  Taken 6/4/2024 0800  Verbalizes/displays adequate comfort level or baseline comfort level:   Encourage patient to monitor pain and request assistance   Assess pain using appropriate pain scale   Administer analgesics based on type and severity of pain and evaluate response     Problem: Cardiovascular - Adult  Goal: Maintains optimal cardiac output and hemodynamic stability  Outcome: Progressing  Flowsheets  Taken 6/4/2024 1600  Maintains optimal cardiac output and hemodynamic stability:   Monitor blood pressure and heart

## 2024-06-04 NOTE — PROGRESS NOTES
[facility] Neurology   IN-PATIENT SERVICE      NEUROLOGY PROGRESS  NOTE            Date:   6/4/2024  Patient name:  Kavya De Santiago  Date of admission:  5/26/2024  YOB: 1960      Interval History:     6/4: Had MRI brain done yesterday.  Continues to have improvement in mentation.  No new neurological deficits.  Working with PT/OT/ST.    6/3: No acute events.  Was extubated yesterday.  This morning, per nursing staff and  at bedside, mentation improved.  She is able to answer questions although slow to respond.  No seizures or seizure-like activity.    History of Present Illness:     Per my partner:    Briefly, this is a  64 y.o. female with hx of HTN, DM, DIEGO and CKD was admitted on 5/26/2024 with altered mentation.  Initial history obtained from EMS report and patient's .  EMS report states that they were called to the patient's house for c/o nausea and vomiting and upon evaluation by EMS noted to be with abnormal glucose concerning for DKA.  While transporting the patient to the hospital; she became completely unresponsive with O2 saturations dropping for which patient was placed on supraglottic airway.  She was hypertensive on arrival with blood pressures with systolic around 160 mm Hg.  Patient was also noted to be febrile with Tmax 104 °F.  CT head on admit is negative for acute intracranial abnormalities.  Spinal fluid studies done and does help significantly abnormal with elevated CSF protein and neutrophilic pleocytosis with meningitis panel positive for H influenza for which patient has been on IV ceftriaxone and vancomycin.  Patient remained on vent since admitted.  Weaning trials were done and patient was off of sedation for several hours today.  Patient was not following commands.  Then patient became extremely violent and needed to be resumed back on sedation and presently on fentanyl and Precedex drip.  Caregivers also stated that patient has been on sedation holidays  HISTORY:  ams, vomiting,    Decision Support Exception - unselect if not a suspected or confirmed  emergency medical condition->Emergency Medical Condition (MA)  Reason for Exam: Found down, LT pupil more reactive than RT. Nausea,  vomiting.  Intubated in route    FINDINGS:  BRAIN/VENTRICLES: There is no acute intracranial hemorrhage, mass effect or  midline shift.  No abnormal extra-axial fluid collection.  The gray-white  differentiation is maintained without evidence of an acute infarct.  There is  no evidence of hydrocephalus.    ORBITS: The visualized portion of the orbits demonstrate no acute abnormality.    SINUSES: The visualized paranasal sinuses and mastoid air cells demonstrate  no acute abnormality.    SOFT TISSUES/SKULL:  No acute abnormality of the visualized skull or soft  tissues.    Impression  No acute intracranial abnormality.          I personally reviewed all of the above medications, clinical laboratory, imaging and other diagnostic tests.         Impression:      Acute encephalopathy, in setting of haemophilus influenza meningitis, improving. Now extubated.  No seizures or seizure-like activity.  Small acute-subacute, left posterior frontal infarct.  Appears cortical/embolic.  This is in setting of bacterial meningitis (can occur due to various mechanisms including inflammatory response, vasculopathy, hypercoagulability).  CTA head and neck with no acute findings.  Infarct is essentially asymptomatic.    Plan:     Given cortical infarct and bacteremia, recommend EMANUEL to evaluate for endocarditis.  Previous TTE with no acute findings.  Discussed with ID.  On asa 81 mg. Continue for now (was off for last 6 days).  Continue home lipitor 40 mg. Obtain lipid panel, A1c.  EEG done, report pending.  Continue Keppra 500 mg twice daily.  Continue antibiotics as per ID recs.  Limit sedative medications.  PT/OT/ST. Will need placement.  Rest of care per primary team.    We will continue to follow.

## 2024-06-04 NOTE — PROGRESS NOTES
Physical Therapy  Facility/Department: [facility] ICU  Physical Therapy Initial Assessment    Name: Kavya De Santiago  : 1960  MRN: [id number]  Date of Service: 2024    Discharge Recommendations:  Therapy recommended at discharge          Patient Diagnosis(es): The primary encounter diagnosis was Colitis. Diagnoses of Acute respiratory failure, unspecified whether with hypoxia or hypercapnia (HCC), Hypomagnesemia, and Shortness of breath were also pertinent to this visit.  Past Medical History:  has a past medical history of Cervical spondylosis, CKD (chronic kidney disease), COVID-19, Fall, Generalized anxiety disorder, GERD (gastroesophageal reflux disease), History of recent hospitalization, History of swelling of feet, Hyperlipidemia, Hypertension, Left hand weakness, Lumbar radiculopathy, DIEGO (nonalcoholic steatohepatitis), Obesity, Osteoarthritis of left knee, Recurrent major depressive disorder (HCC), Restless legs syndrome, Snores, TIA (transient ischemic attack), Type II or unspecified type diabetes mellitus without mention of complication, not stated as uncontrolled, Wears glasses, and Wellness examination.  Past Surgical History:  has a past surgical history that includes Upper gastrointestinal endoscopy (2012); Colonoscopy (2012);  section; Hysterectomy, total abdominal (); shoulder surgery (Left, 2021); Spine surgery (Left, 10/03/2022); Spine surgery (Left, 10/06/2022); Tonsillectomy; other surgical history (Left, 2022); and Carpal tunnel release (Left, 2022).    Assessment   Assessment: Significant decline from baseline functional mobility with associated cognitive, strength, and balance deficits impacting safety and independence with mobility. Pt requires ModA for bed mobility, and ModA of 1-2 for transfers and very short distance ambulation with Maximal Cueing for safety and sequencing of tasks. RW for mobility. Pt unsafe to return home at this current  RAFAELA  Comment: See OT     Sensation  Overall Sensation Status: Impaired (N/T in L hand and LLE)     Bed mobility  Rolling to Left: Maximum assistance  Rolling to Right: Moderate assistance  Supine to Sit: Maximum assistance  Sit to Supine: Moderate assistance  Scooting: Dependent/Total (2 Assist)  Bed Mobility Comments: Bed mobility completed with HOB elevated with use of bed rail. Maximal verbal/tactile cueing required to initiation and perform to completion. pt progresses sitting EOB balance from ModA to close SBA using BUE to support upright positioning.  Transfers  Sit to Stand: Moderate Assistance  Stand to Sit: Minimal Assistance  Bed to Chair: 2 Person Assistance (ModAx2)  Stand Pivot Transfers: 2 Person Assistance (ModAx2)  Comment: RW for transfers, Cues for hands placement. Maximal Verbal and tactile cueing as well as continuous assistance needed to Manage RW, Physical Assistance for Weightshifting and pt would advance Each LE per self physically but with step by step cueing.  Ambulation  Surface: Level tile  Device: Rolling Walker  Assistance: 2 Person assistance (Giovanni-ModAx2 for forward walking)  Quality of Gait: Slowed, pt demo's retropulsion during standing and ambulation, Assist needed for adequate weightshifting and MAX verbal cues to continue task.  Gait Deviations: Slow Kaelyn;Decreased step length;Decreased step height;Decreased head and trunk rotation;Shuffles  Distance: 2'  Stairs/Curb  Stairs?: No (unsafe to attempt at this time d/t balance and safety concerns)     Balance  Posture: Fair  Sitting - Static: Fair;-  Sitting - Dynamic: Poor;+  Standing - Static: Fair;- (RW)  Standing - Dynamic: Poor;+ (RW)  Comments: Frequent retropulsion in static and dynamic standing/stepping with RW  Exercise Treatment: Bed <> Chair transfers, pt returned to bed d/t impaired cognition and present safety concerns with sitting to a chair for prolonged period.        AM-PAC - Mobility    AM-PAC Basic Mobility -

## 2024-06-04 NOTE — PROGRESS NOTES
ICU Progress Note (Non-Vent)  [facility]    Patient - Kavya De Santiago,  Age - 64 y.o.    - 1960      Room Number -    MRN -  [id number]   [facility] # - [id number]  Date of Admission -  2024  8:51 AM    Events of Past 24 Hours     Continues to improve, more alert still has NG tube in place    Vitals    height is 1.575 m (5' 2\") and weight is 99 kg (218 lb 4.1 oz). Her temperature is 98.2 °F (36.8 °C). Her blood pressure is 166/71 (abnormal) and her pulse is 79. Her respiration is 19 and oxygen saturation is 94%.       Temperature Range: Temp: 98.2 °F (36.8 °C) Temp  Av.1 °F (37.3 °C)  Min: 98.2 °F (36.8 °C)  Max: 99.9 °F (37.7 °C)  BP Range:  Systolic (24hrs), Av , Min:134 , Max:187     Diastolic (24hrs), Av, Min:53, Max:96    Pulse Range: Pulse  Av.4  Min: 56  Max: 93  Respiration Range: Resp  Av  Min: 12  Max: 25  Current Pulse Ox::  SpO2: 94 %  24HR Pulse Ox Range:  SpO2  Av.9 %  Min: 86 %  Max: 98 %  Oxygen Amount and Delivery: O2 Flow Rate (L/min): 2 L/min    Wt Readings from Last 3 Encounters:   24 99 kg (218 lb 4.1 oz)   24 91.4 kg (201 lb 9.6 oz)   10/03/23 90.1 kg (198 lb 9.6 oz)     I/O     Intake/Output Summary (Last 24 hours) at 2024 0801  Last data filed at 2024 0444  Gross per 24 hour   Intake 807.28 ml   Output 2350 ml   Net -1542.72 ml       DRAIN/TUBE OUTPUT       Invasive Lines   ICP PRESSURE RANGE  No data recorded  CVP PRESSURE RANGE  No data recorded      Medications      metoprolol tartrate  100 mg Oral BID    lisinopril  40 mg Oral Daily    levETIRAcetam  500 mg IntraVENous Q12H    busPIRone  10 mg Per NG tube TID    insulin glargine  20 Units SubCUTAneous Daily    miconazole   Topical BID    enoxaparin  30 mg SubCUTAneous BID    vancomycin  125 mg Oral 4 times per day    rOPINIRole  2 mg Oral BID    pantoprazole  (PROTONIX) 40 mg in sodium chloride (PF) 0.9 % 10 mL injection  40 mg IntraVENous Daily    insulin lispro  0-8 Units SubCUTAneous Q4H    cefTRIAXone (ROCEPHIN) IV  2,000 mg IntraVENous Q12H    aspirin  81 mg Oral Daily    atorvastatin  40 mg Oral Nightly    latanoprost  1 drop Both Eyes Nightly    [Held by provider] venlafaxine  75 mg Oral Daily    sodium chloride flush  5-40 mL IntraVENous 2 times per day    sodium chloride  500 mL IntraVENous Once     potassium chloride, fentanNYL, sodium chloride flush, sodium chloride flush, fentanNYL, sodium chloride flush, sodium chloride flush, sodium chloride, potassium chloride **OR** potassium alternative oral replacement **OR** potassium chloride, magnesium sulfate, ondansetron **OR** ondansetron, polyethylene glycol, acetaminophen **OR** acetaminophen, glucose, dextrose bolus **OR** dextrose bolus, glucagon (rDNA), dextrose, labetalol  IV Drips/Infusions   dexmedeTOMIDine HCl in NaCl Stopped (06/03/24 1643)    niCARdipine Stopped (06/01/24 1729)    sodium chloride      dextrose         Diet/Nutrition   ADULT DIET; Clear Liquid    Exam      Constitutional - Alert, arousable  General Appearance morbidly obese  HEENT -normocephalic, atraumatic. PERRLA macroglossia abnormal.  Lungs - Chest expands equally, no wheezes, rales or rhonchi.  Diminished breath sounds  Cardiovascular - Heart sounds are normal.  normal rate and rhythm regular, no murmur, gallop or rub.  Abdomen - soft, nontender, nondistended, no masses or organomegaly  Neurologic -continues to improve, seems to understand me but does have evidence of expressive aphasia  Skin - no bruising or bleeding  Extremities - no cyanosis, clubbing or edema    Lab Results   CBC     Lab Results   Component Value Date/Time    WBC 10.9 06/04/2024 03:46 AM    RBC 3.32 06/04/2024 03:46 AM    HGB 9.1 06/04/2024 03:46 AM    HCT 28.5 06/04/2024 03:46 AM     06/04/2024 03:46 AM    MCV 86.0 06/04/2024 03:46 AM    MCH 27.5

## 2024-06-04 NOTE — PROGRESS NOTES
SLP ALL NOTES  Facility/Department: [facility] ICU  Initial Speech/Language/Cognitive Assessment    NAME: Kavya De Santiago  : 1960   MRN: [id number]  ADMISSION DATE: 2024  ADMITTING DIAGNOSIS: has Cervical spondylosis; Type 2 diabetes mellitus without complication, without long-term current use of insulin ([facility]); Hypertension; Abnormal auditory perception; Nasal polyps; Osteoarthritis of left knee; Osteoarthritis of right knee; DIEGO (nonalcoholic steatohepatitis); Vitamin D deficiency; Iron deficiency anemia; Dyslipidemia; Trochanteric bursitis; Chronic left shoulder pain; Restless legs syndrome; Generalized anxiety disorder; Acute encephalopathy; Class 2 obesity in adult; Leg edema; Recurrent major depressive disorder ([facility]); Dermatitis; Retrolisthesis of vertebrae; Bilateral carpal tunnel syndrome; Intention tremor; Sciatica; Back pain with radiation; Left sciatic nerve pain; Lumbar disc disease with radiculopathy; Intractable back pain; Piriformis syndrome of left side; Ulnar neuropathy at elbow, left; S/P decompression of ulnar nerve at elbow; Cervical myelopathy with cervical radiculopathy ([facility]); Lumbar spondylosis; Right median nerve neuropathy; Sacroiliitis ([facility]); Secondary diabetes mellitus with stage 2 chronic kidney disease (GFR 60-89) ([facility]); Benign hypertension with CKD (chronic kidney disease) stage III ([facility]); Severe obesity (BMI 35.0-39.9) with comorbidity ([facility]); Stenosis of lateral recess of lumbosacral spine; Type 2 diabetes mellitus with chronic kidney disease; Sacroiliac joint pain; Type 2 diabetes mellitus with hyperglycemia; Severe diarrhea; Pancolitis ([facility]); Fever; Acute respiratory failure ([facility]); Colitis; Haemophilus influenzae meningitis; Bacteremia; Sepsis due to Haemophilus influenzae with acute respiratory failure without septic shock ([facility]); Anemia; Elevated LFTs; Unresponsive state; Meningoencephalitis; History of TIA (transient ischemic attack); and Cerebrovascular accident (CVA)

## 2024-06-04 NOTE — CONSULTS
Physical Medicine & Rehabilitation  Consult Note      Admitting Physician: Venu Lorenzo MD    Primary Care Provider: Shaina Hamilton MD     Reason for Consult:  Acute Inpatient Rehabilitation    Chief Complaint: Loss of consciousness    History of Present Illness:  Referring Provider is requesting an evaluation for appropriate placement upon discharge from acute care.     Ms. Kavya De Santiago is a 64 y.o. female who was admitted to [facility] on 5/26/2024 with Loss of Consciousness    64-year-old female with history of type 2 diabetes, hypertension hyperlipidemia, CKD, GERD, DIEGO, obesity admitted with change in mental status, nausea vomiting diarrhea.  She required intubation reportedly patient had some upper respiratory symptoms over the past week with dry cough decreased appetite rhinorrhea congestion headache and sinus pain.  She then began to have some diarrhea and vomiting..  EMS noted GCS of 89 decreased O2 sat.  She was intubated CT chest showed no PE CT abdomen pelvis showed pancolitis with abnormal wall thickening of the ascending transverse descending colon sigmoid colon and rectum she was started antibiotic she had elevated glucose influenza was negative    Critical care 6/2-acute hypoxic respite failure intubated 5/26 extubated 6/2, sepsis with H influenza bacteremia and meningitis question colitis has rectal tube, chronic back pain-sees neurology, neurosurgery and pain management Dr. Granados, obstruct sleep apnea no sleep study done never followed up in office nonmorbid obesity COVID-negative    GI-diarrhea, pancolitis stool cultures negative on empiric Vanco per ID, anemia no overt bleeding adequate iron stores trend H&H continue PPI, elevated LFT's liver ultrasound showing steatosis, workup pending, sepsis secondary to haemophilus influenza, GI signed off    ID-haemophilus influenza meningitis/bacteremia and sepsis, colitis meningitis panel was positive for haemophilus influenza, continue  Attack Mother     Diabetes Mother     Diabetes Father     Heart Attack Father            Physical Exam:    BP (!) 151/66   Pulse 71   Temp 98.6 °F (37 °C)   Resp 20   Ht 1.575 m (5' 2\")   Wt 99 kg (218 lb 4.1 oz)   SpO2 97%   BMI 39.92 kg/m²     General appearance: alert, appears stated age, cooperative, and no distress  HEENT: Normocephalic, without obvious abnormality, atraumatic               Eyes: conjunctivae clear.                Throat: tongue normal.               Neck:  symmetrical, trachea midline.  Pulm: clear to auscultation bilaterally.  Cardiac: regular rate and rhythm, no murmur.  Abdomen: soft, non-tender; bowel sounds normal.  MSK: extremities normal, atraumatic, no edema, normal tone.   ROM: Appears a functional range of motion upper and distal lower extremities able to partially straight leg raise  Mental status/Psych: Alert,, knew location, inconsistently follows commands , appears a bit confused  Skin:     Neuro:      Sensory: Intact in BUE and BLE to soft and pin sensation.  Motor: Muscle tone and bulk are normal bilaterally. No pronator drift.          Diagnostics:  CBC   Lab Results   Component Value Date/Time    WBC 10.9 06/04/2024 03:46 AM    RBC 3.32 06/04/2024 03:46 AM    HGB 9.1 06/04/2024 03:46 AM    HCT 28.5 06/04/2024 03:46 AM    MCV 86.0 06/04/2024 03:46 AM    RDW 13.8 06/04/2024 03:46 AM     06/04/2024 03:46 AM     BMP    Lab Results   Component Value Date/Time     06/04/2024 03:46 AM    K 3.6 06/04/2024 03:46 AM     06/04/2024 03:46 AM    CO2 28 06/04/2024 03:46 AM    BUN 10 06/04/2024 03:46 AM     Uric Acid  No components found for: \"URIC\"  VITAMIN B12 No components found for: \"B12\"  PT/INR  No results found for: \"PTINR\"      Impression: Ms. Kavya De Santiago is a 64 y.o. female with a history of Severe diarrhea    Subacute infarct left frontal lobe-aspirin  Encephalopathy due to meningitis haemophilus influenza, sepsis-ceftriaxone, oral

## 2024-06-04 NOTE — PROGRESS NOTES
Dr. Huffman notified of hypertension between 160-180-'s. Notified of current blood pressure medications that patient is on and notified that PRN labetalol was given earlier with no improvement in blood pressure. MD states he is ok with SBP below 165 but will review chart and place orders.

## 2024-06-04 NOTE — PROGRESS NOTES
GOPAL Berry NP, notified of pt  systolic, too early for Labetolol dose, additional labetolol ordered and Lopressor dose increased to BID starting in morning

## 2024-06-04 NOTE — PROGRESS NOTES
[facility]   Occupational Therapy Evaluation  Date: 24  Patient Name: Kavya De Santiago       Room:   MRN: [id number]  Account: [id number]   : 1960  (64 y.o.) Gender: female     Discharge Recommendations:  Further Occupational Therapy is recommended upon facility discharge.    OT Equipment Recommendations  Other: TBD              Treatment Diagnosis: Impaired self care status    Past Medical History:  has a past medical history of Cervical spondylosis, CKD (chronic kidney disease), COVID-19, Fall, Generalized anxiety disorder, GERD (gastroesophageal reflux disease), History of recent hospitalization, History of swelling of feet, Hyperlipidemia, Hypertension, Left hand weakness, Lumbar radiculopathy, DIEGO (nonalcoholic steatohepatitis), Obesity, Osteoarthritis of left knee, Recurrent major depressive disorder (HCC), Restless legs syndrome, Snores, TIA (transient ischemic attack), Type II or unspecified type diabetes mellitus without mention of complication, not stated as uncontrolled, Wears glasses, and Wellness examination.    Past Surgical History:   has a past surgical history that includes Upper gastrointestinal endoscopy (2012); Colonoscopy (2012);  section; Hysterectomy, total abdominal (); shoulder surgery (Left, 2021); Spine surgery (Left, 10/03/2022); Spine surgery (Left, 10/06/2022); Tonsillectomy; other surgical history (Left, 2022); and Carpal tunnel release (Left, 2022).    Restrictions  Restrictions/Precautions  Restrictions/Precautions: Fall Risk, General Precautions  Required Braces or Orthoses?: No  Implants present? :  ( denies)      Vitals  Vitals  O2 Device: Nasal cannula     Subjective  Subjective: \"I have to pee.\" Pt agreeable to OT eval  Comments: Ok per LEELA Ashford for OT eval  Subjective  Pain:  reports chronic back pain. Pt unable to rate at this time      Social/Functional  to CGA    Balance  Balance  Sitting Balance: Moderate assistance (Mod A initially with pt able to progress with CGA)  Standing Balance: Moderate assistance (Mod A x 2 with RW)  Standing Balance  Time: 3-4 mintues  Activity: Functional transfers/mobility  Comment: BUE support on RW. 2 person A for safety    Transfers  Transfers  Sit to stand: 2 Person assistance, Moderate assistance  Stand to sit: 2 Person assistance, Moderate assistance  Transfer Comments: Verbal cues for hand placement and safety. 2 person A for safety to complete    Functional Mobility  Functional - Mobility Device: Rolling Walker  Activity: Other (Small steps laterally and forwards and back)  Assist Level: Moderate assistance (Mod A x 2)  Functional Mobility Comments: Cues for hand placement and safety. Pt demonstrated short steps with increased time and cues to complete. 2 person A to complete    Assessment  Assessment  Performance deficits / Impairments: Decreased ADL status, Decreased functional mobility , Decreased ROM, Decreased strength, Decreased safe awareness, Decreased cognition, Decreased endurance, Decreased balance, Decreased high-level IADLs, Decreased fine motor control, Decreased coordination, Decreased posture  Treatment Diagnosis: Impaired self care status  Prognosis: Fair  Decision Making: High Complexity  Discharge Recommendations: Patient would benefit from continued therapy after discharge    Activity Tolerance  Activity Tolerance: Patient Tolerated treatment well, Patient limited by fatigue, Patient limited by pain, Treatment limited secondary to decreased cognition    Safety Devices  Type of Devices: All fall risk precautions in place, Call light within reach, Gait belt, Patient at risk for falls, Left in bed, Nurse notified (Nurse in room at end of session)    Patient Education  Patient Education  Education Given To: Patient, Family  Education Provided: Role of Therapy, Plan of Care, Transfer Training  Education Method:

## 2024-06-04 NOTE — PROCEDURES
PROCEDURE NOTE  Date: 6/4/2024   Name: Kavya De Santiago  YOB: 1960    Procedures    EEG REPORT     CLINICAL NEUROPHYSIOLOGY LABORATORY  DEPARTMENT OF NEUROLOGY  [facility]       Patient: Kavya De Santiago Age: 64 y.o.  MRN: [id number]      Referring Provider: No ref. provider found    History: This routine 30 minute scalp EEG was recorded with video- monitoring for a 64 y.o.. female  who presented with encephalopathy. This EEG was performed to evaluate for focal and epileptiform abnormalities.     Kavya De Santiago   Current Facility-Administered Medications   Medication Dose Route Frequency Provider Last Rate Last Admin    potassium chloride 10 mEq/100 mL IVPB (Peripheral Line)  10 mEq IntraVENous Q1H Cristobal Ferrer  mL/hr at 06/04/24 1155 10 mEq at 06/04/24 1155    potassium chloride 10 mEq/100 mL IVPB (Peripheral Line)  10 mEq IntraVENous PRN Tierra Berry APRN -  mL/hr at 06/03/24 1816 10 mEq at 06/03/24 1816    fentaNYL (SUBLIMAZE) injection 25 mcg  25 mcg IntraVENous Q1H PRN Rosendo Huffman MD        sodium chloride flush 0.9 % injection 10 mL  10 mL IntraVENous PRN Annel Cavazos MD   10 mL at 06/03/24 2038    metoprolol (LOPRESSOR) tablet 100 mg  100 mg Oral BID Tierra Berry APRN - CNP   100 mg at 06/04/24 0730    lisinopril (PRINIVIL;ZESTRIL) tablet 40 mg  40 mg Oral Daily Cristobal Ferrer MD   40 mg at 06/04/24 0730    levETIRAcetam (KEPPRA) 500 mg in sodium chloride 0.9 % 100 mL IVPB  500 mg IntraVENous Q12H Annel Cavazos MD   Stopped at 06/04/24 0459    busPIRone (BUSPAR) tablet 10 mg  10 mg Per NG tube TID Bo Khan MD   10 mg at 06/04/24 0730    sodium chloride flush 0.9 % injection 10 mL  10 mL IntraVENous PRN Annel Cavazos MD   10 mL at 06/01/24 1958    insulin glargine (LANTUS) injection vial 20 Units  20 Units SubCUTAneous Daily Bakri, Ahsan, MD   20 Units at 06/04/24 0730    miconazole (MICOTIN) 2 % powder   Topical

## 2024-06-04 NOTE — PROGRESS NOTES
[facility] -   Infectious diseases evaluation  admission date 5/26/2024    reason for consultation:   High fever, unknown etiology sepsis    Impression :   Current:  Haemophilus influenza meningitis/ bacteremia  Acute /subacute infarct in the left posterior frontal lobe   Sepsis secondary to above  Colitis  Diabetes mellitus  Chronic kidney disease  Hyperlipidemia  Hypertension  GERD  [place]      Discussion:  Status post lumbar puncture 5/29, cloudy fluid, 280 WBC, meningitis panel was positive for haemophilus influenza.      HENCE:   Continue Ceftriaxone 2 gm IV every 12 hours to finish 14 days course  No obvious vegetations per 2 d echo.   Cardiology consulted for EMANUEL  Follow CBC and renal function closely.  Vancomycin 125 mg po every 6 hours  Follow blood cultures.  Supportive care.        Infection Control Recommendations   [place] Precautions      Antimicrobial Stewardship Recommendations   Simplification of therapy  Targeted therapy      History of Present Illness:   Initial history:  Kavya De Santiago is a 64 y.o.-year-old female was brought to the hospital for altered mental status associated with nausea, vomiting and diarrhea.  The patient is intubated, unable to provide history that was obtained from chart review and  at the bedside had upper respiratory symptoms, congestion, headache and rhinorrhea for 1 week associated with dry cough and decreased appetite.  The patient was found on the ground on the day of admission, EMS called.  The patient was intubated at the ER.  The patient was hypothermic initially then has been running high-grade fever with temperature max of 105, hypotensive, on pressors.  CT head showed no acute abnormality, CTA chest showed no evidence of PE, CT abdomen pelvis showed pancolitis with abnormal wall thickening of the ascending, transverse, descending colon, sigmoid colon and rectum.   Initial lactic acid 3.3, WBC 11.5,  Patient unknown timeframe of LE swelling b/l; believes that it started in March   - Hx of factor V Leiden  - Pro BNP w/i nl  - reports LLE pain w/ edema > RLE  - 6/14 US doppler of b/l LE: no evidence of DVTs  - 6/27 LLE duplex ultrasound - no evidence of DVT  - 6/28 b/l LE swelling, recommend ace bandages/compression stockings  - 6/29 improving w/ ace bandages  - continue to monitor

## 2024-06-04 NOTE — CARE COORDINATION
Patient NO LONGER meets LTAC criteria.    Electronically signed by Caridad August RN on 6/4/2024 at 11:56 AM

## 2024-06-05 LAB
ANION GAP SERPL CALCULATED.3IONS-SCNC: 12 MMOL/L (ref 9–17)
ARTERIAL PATENCY WRIST A: ABNORMAL
BASOPHILS # BLD: 0.1 K/UL (ref 0–0.2)
BASOPHILS NFR BLD: 1 % (ref 0–2)
BDY SITE: ABNORMAL
BODY TEMPERATURE: 37
BUN SERPL-MCNC: 9 MG/DL (ref 8–23)
CALCIUM SERPL-MCNC: 9 MG/DL (ref 8.6–10.4)
CHLORIDE SERPL-SCNC: 104 MMOL/L (ref 98–107)
CHOLEST SERPL-MCNC: 126 MG/DL
CHOLESTEROL/HDL RATIO: 4.7
CO2 SERPL-SCNC: 26 MMOL/L (ref 20–31)
COHGB MFR BLD: 1.3 % (ref 0–5)
CREAT SERPL-MCNC: 0.6 MG/DL (ref 0.5–0.9)
EOSINOPHIL # BLD: 0.4 K/UL (ref 0–0.4)
EOSINOPHILS RELATIVE PERCENT: 3 % (ref 0–4)
ERYTHROCYTE [DISTWIDTH] IN BLOOD BY AUTOMATED COUNT: 13.8 % (ref 11.5–14.9)
EST. AVERAGE GLUCOSE BLD GHB EST-MCNC: 266 MG/DL
GAS FLOW.O2 O2 DELIVERY SYS: ABNORMAL L/MIN
GFR, ESTIMATED: >90 ML/MIN/1.73M2
GLUCOSE BLD-MCNC: 149 MG/DL (ref 65–105)
GLUCOSE BLD-MCNC: 159 MG/DL (ref 65–105)
GLUCOSE BLD-MCNC: 176 MG/DL (ref 65–105)
GLUCOSE BLD-MCNC: 179 MG/DL (ref 65–105)
GLUCOSE SERPL-MCNC: 183 MG/DL (ref 70–99)
HBA1C MFR BLD: 10.9 % (ref 4–6)
HCO3 ARTERIAL: 29.1 MMOL/L (ref 22–26)
HCT VFR BLD AUTO: 30.3 % (ref 36–46)
HDLC SERPL-MCNC: 27 MG/DL
HGB BLD-MCNC: 9.6 G/DL (ref 12–16)
LDLC SERPL CALC-MCNC: 47 MG/DL (ref 0–130)
LYMPHOCYTES NFR BLD: 2.4 K/UL (ref 1–4.8)
LYMPHOCYTES RELATIVE PERCENT: 17 % (ref 24–44)
MCH RBC QN AUTO: 27.3 PG (ref 26–34)
MCHC RBC AUTO-ENTMCNC: 31.8 G/DL (ref 31–37)
MCV RBC AUTO: 86.1 FL (ref 80–100)
METHEMOGLOBIN: 1.1 % (ref 0–1.9)
MICROBIOLOGY SEND OUT REPORT: NORMAL
MONOCYTES NFR BLD: 1.5 K/UL (ref 0.1–1.3)
MONOCYTES NFR BLD: 11 % (ref 1–7)
NEUTROPHILS NFR BLD: 68 % (ref 36–66)
NEUTS SEG NFR BLD: 9.6 K/UL (ref 1.3–9.1)
O2 SAT, ARTERIAL: 91.8 % (ref 95–98)
PCO2 ARTERIAL: 31.6 MMHG (ref 35–45)
PH ARTERIAL: 7.57 (ref 7.35–7.45)
PLATELET # BLD AUTO: 382 K/UL (ref 150–450)
PMV BLD AUTO: 6.9 FL (ref 6–12)
PO2 ARTERIAL: 64.3 MMHG (ref 80–100)
POSITIVE BASE EXCESS, ART: 7 MMOL/L (ref 0–2)
POTASSIUM SERPL-SCNC: 3.7 MMOL/L (ref 3.7–5.3)
POTASSIUM SERPL-SCNC: 4 MMOL/L (ref 3.7–5.3)
PT. POSITION: ABNORMAL
RBC # BLD AUTO: 3.52 M/UL (ref 4–5.2)
RESPIRATORY RATE: 28
SMOOTH MUSCLE ANTIBODY: 18 UNITS (ref 0–19)
SODIUM SERPL-SCNC: 142 MMOL/L (ref 135–144)
TEST NAME: NORMAL
TEXT FOR RESPIRATORY: ABNORMAL
TRIGL SERPL-MCNC: 262 MG/DL
WBC OTHER # BLD: 14 K/UL (ref 3.5–11)

## 2024-06-05 PROCEDURE — 92526 ORAL FUNCTION THERAPY: CPT

## 2024-06-05 PROCEDURE — 97110 THERAPEUTIC EXERCISES: CPT

## 2024-06-05 PROCEDURE — 2580000003 HC RX 258: Performed by: INTERNAL MEDICINE

## 2024-06-05 PROCEDURE — 6370000000 HC RX 637 (ALT 250 FOR IP)

## 2024-06-05 PROCEDURE — 99232 SBSQ HOSP IP/OBS MODERATE 35: CPT | Performed by: PHYSICAL MEDICINE & REHABILITATION

## 2024-06-05 PROCEDURE — 36415 COLL VENOUS BLD VENIPUNCTURE: CPT

## 2024-06-05 PROCEDURE — 6370000000 HC RX 637 (ALT 250 FOR IP): Performed by: INTERNAL MEDICINE

## 2024-06-05 PROCEDURE — 99232 SBSQ HOSP IP/OBS MODERATE 35: CPT | Performed by: INTERNAL MEDICINE

## 2024-06-05 PROCEDURE — 99233 SBSQ HOSP IP/OBS HIGH 50: CPT | Performed by: PSYCHIATRY & NEUROLOGY

## 2024-06-05 PROCEDURE — 51798 US URINE CAPACITY MEASURE: CPT

## 2024-06-05 PROCEDURE — 2500000003 HC RX 250 WO HCPCS: Performed by: INTERNAL MEDICINE

## 2024-06-05 PROCEDURE — 6370000000 HC RX 637 (ALT 250 FOR IP): Performed by: NURSE PRACTITIONER

## 2024-06-05 PROCEDURE — 84132 ASSAY OF SERUM POTASSIUM: CPT

## 2024-06-05 PROCEDURE — 6360000002 HC RX W HCPCS: Performed by: INTERNAL MEDICINE

## 2024-06-05 PROCEDURE — 36600 WITHDRAWAL OF ARTERIAL BLOOD: CPT

## 2024-06-05 PROCEDURE — 82140 ASSAY OF AMMONIA: CPT

## 2024-06-05 PROCEDURE — 99233 SBSQ HOSP IP/OBS HIGH 50: CPT | Performed by: INTERNAL MEDICINE

## 2024-06-05 PROCEDURE — 80061 LIPID PANEL: CPT

## 2024-06-05 PROCEDURE — 85025 COMPLETE CBC W/AUTO DIFF WBC: CPT

## 2024-06-05 PROCEDURE — 6360000002 HC RX W HCPCS: Performed by: PSYCHIATRY & NEUROLOGY

## 2024-06-05 PROCEDURE — 80048 BASIC METABOLIC PNL TOTAL CA: CPT

## 2024-06-05 PROCEDURE — 82805 BLOOD GASES W/O2 SATURATION: CPT

## 2024-06-05 PROCEDURE — 2060000000 HC ICU INTERMEDIATE R&B

## 2024-06-05 PROCEDURE — 2580000003 HC RX 258

## 2024-06-05 PROCEDURE — 6360000002 HC RX W HCPCS

## 2024-06-05 PROCEDURE — 83036 HEMOGLOBIN GLYCOSYLATED A1C: CPT

## 2024-06-05 PROCEDURE — 92507 TX SP LANG VOICE COMM INDIV: CPT

## 2024-06-05 PROCEDURE — 97530 THERAPEUTIC ACTIVITIES: CPT

## 2024-06-05 PROCEDURE — A4216 STERILE WATER/SALINE, 10 ML: HCPCS | Performed by: INTERNAL MEDICINE

## 2024-06-05 PROCEDURE — 93005 ELECTROCARDIOGRAM TRACING: CPT | Performed by: PSYCHIATRY & NEUROLOGY

## 2024-06-05 PROCEDURE — 6370000000 HC RX 637 (ALT 250 FOR IP): Performed by: PSYCHIATRY & NEUROLOGY

## 2024-06-05 PROCEDURE — 82947 ASSAY GLUCOSE BLOOD QUANT: CPT

## 2024-06-05 PROCEDURE — 2580000003 HC RX 258: Performed by: PSYCHIATRY & NEUROLOGY

## 2024-06-05 PROCEDURE — C9113 INJ PANTOPRAZOLE SODIUM, VIA: HCPCS | Performed by: INTERNAL MEDICINE

## 2024-06-05 RX ORDER — DEXMEDETOMIDINE HYDROCHLORIDE 4 UG/ML
.1-1.5 INJECTION, SOLUTION INTRAVENOUS CONTINUOUS
Status: DISCONTINUED | OUTPATIENT
Start: 2024-06-05 | End: 2024-06-08

## 2024-06-05 RX ORDER — FENTANYL CITRATE 0.05 MG/ML
25 INJECTION, SOLUTION INTRAMUSCULAR; INTRAVENOUS ONCE
Status: COMPLETED | OUTPATIENT
Start: 2024-06-06 | End: 2024-06-06

## 2024-06-05 RX ORDER — VANCOMYCIN HYDROCHLORIDE 125 MG/1
125 CAPSULE ORAL 4 TIMES DAILY
Status: DISCONTINUED | OUTPATIENT
Start: 2024-06-05 | End: 2024-06-06

## 2024-06-05 RX ORDER — QUETIAPINE FUMARATE 50 MG/1
25 TABLET, FILM COATED ORAL NIGHTLY
Status: DISCONTINUED | OUTPATIENT
Start: 2024-06-05 | End: 2024-06-07

## 2024-06-05 RX ORDER — HYDRALAZINE HYDROCHLORIDE 20 MG/ML
5 INJECTION INTRAMUSCULAR; INTRAVENOUS ONCE
Status: DISCONTINUED | OUTPATIENT
Start: 2024-06-05 | End: 2024-06-05

## 2024-06-05 RX ORDER — HYDRALAZINE HYDROCHLORIDE 20 MG/ML
5 INJECTION INTRAMUSCULAR; INTRAVENOUS ONCE
Status: COMPLETED | OUTPATIENT
Start: 2024-06-05 | End: 2024-06-05

## 2024-06-05 RX ADMIN — BUSPIRONE HYDROCHLORIDE 10 MG: 10 TABLET ORAL at 13:38

## 2024-06-05 RX ADMIN — ANTI-FUNGAL POWDER MICONAZOLE NITRATE TALC FREE: 1.42 POWDER TOPICAL at 08:25

## 2024-06-05 RX ADMIN — FENTANYL CITRATE 25 MCG: 0.05 INJECTION, SOLUTION INTRAMUSCULAR; INTRAVENOUS at 00:45

## 2024-06-05 RX ADMIN — LABETALOL HYDROCHLORIDE 10 MG: 5 INJECTION, SOLUTION INTRAVENOUS at 15:32

## 2024-06-05 RX ADMIN — LEVETIRACETAM 500 MG: 100 INJECTION, SOLUTION INTRAVENOUS at 17:43

## 2024-06-05 RX ADMIN — LATANOPROST 1 DROP: 50 SOLUTION OPHTHALMIC at 20:04

## 2024-06-05 RX ADMIN — ENOXAPARIN SODIUM 30 MG: 100 INJECTION SUBCUTANEOUS at 20:47

## 2024-06-05 RX ADMIN — CEFTRIAXONE SODIUM 2000 MG: 2 INJECTION, POWDER, FOR SOLUTION INTRAMUSCULAR; INTRAVENOUS at 04:15

## 2024-06-05 RX ADMIN — LISINOPRIL 40 MG: 20 TABLET ORAL at 08:22

## 2024-06-05 RX ADMIN — HYDRALAZINE HYDROCHLORIDE 5 MG: 20 INJECTION, SOLUTION INTRAMUSCULAR; INTRAVENOUS at 17:42

## 2024-06-05 RX ADMIN — Medication 5 DROP: at 20:47

## 2024-06-05 RX ADMIN — ANTI-FUNGAL POWDER MICONAZOLE NITRATE TALC FREE: 1.42 POWDER TOPICAL at 20:05

## 2024-06-05 RX ADMIN — METOPROLOL 100 MG: 100 TABLET ORAL at 08:22

## 2024-06-05 RX ADMIN — HALOPERIDOL LACTATE 2 MG: 5 INJECTION, SOLUTION INTRAMUSCULAR at 18:06

## 2024-06-05 RX ADMIN — Medication 5 DROP: at 08:37

## 2024-06-05 RX ADMIN — VANCOMYCIN HYDROCHLORIDE 125 MG: KIT at 04:09

## 2024-06-05 RX ADMIN — VANCOMYCIN HYDROCHLORIDE 125 MG: KIT at 15:58

## 2024-06-05 RX ADMIN — ATORVASTATIN CALCIUM 40 MG: 40 TABLET, FILM COATED ORAL at 20:04

## 2024-06-05 RX ADMIN — ENOXAPARIN SODIUM 30 MG: 100 INJECTION SUBCUTANEOUS at 08:23

## 2024-06-05 RX ADMIN — LEVETIRACETAM 500 MG: 100 INJECTION, SOLUTION INTRAVENOUS at 05:26

## 2024-06-05 RX ADMIN — HYDRALAZINE HYDROCHLORIDE 75 MG: 50 TABLET ORAL at 21:15

## 2024-06-05 RX ADMIN — PANTOPRAZOLE SODIUM 40 MG: 40 INJECTION, POWDER, FOR SOLUTION INTRAVENOUS at 08:23

## 2024-06-05 RX ADMIN — ROPINIROLE HYDROCHLORIDE 2 MG: 2 TABLET, FILM COATED ORAL at 20:04

## 2024-06-05 RX ADMIN — ASPIRIN 81 MG: 81 TABLET, COATED ORAL at 08:24

## 2024-06-05 RX ADMIN — SODIUM CHLORIDE, PRESERVATIVE FREE 10 ML: 5 INJECTION INTRAVENOUS at 08:26

## 2024-06-05 RX ADMIN — CEFTRIAXONE SODIUM 2000 MG: 2 INJECTION, POWDER, FOR SOLUTION INTRAMUSCULAR; INTRAVENOUS at 15:57

## 2024-06-05 RX ADMIN — VANCOMYCIN HYDROCHLORIDE 125 MG: 125 CAPSULE ORAL at 21:15

## 2024-06-05 RX ADMIN — BUSPIRONE HYDROCHLORIDE 10 MG: 10 TABLET ORAL at 20:04

## 2024-06-05 RX ADMIN — METOPROLOL 100 MG: 100 TABLET ORAL at 20:04

## 2024-06-05 RX ADMIN — VANCOMYCIN HYDROCHLORIDE 125 MG: KIT at 08:34

## 2024-06-05 RX ADMIN — HYDRALAZINE HYDROCHLORIDE 50 MG: 50 TABLET ORAL at 04:09

## 2024-06-05 RX ADMIN — INSULIN GLARGINE 20 UNITS: 100 INJECTION, SOLUTION SUBCUTANEOUS at 08:25

## 2024-06-05 RX ADMIN — ROPINIROLE HYDROCHLORIDE 2 MG: 2 TABLET, FILM COATED ORAL at 08:21

## 2024-06-05 RX ADMIN — BUSPIRONE HYDROCHLORIDE 10 MG: 10 TABLET ORAL at 08:21

## 2024-06-05 RX ADMIN — HYDRALAZINE HYDROCHLORIDE 50 MG: 50 TABLET ORAL at 13:38

## 2024-06-05 RX ADMIN — LABETALOL HYDROCHLORIDE 10 MG: 5 INJECTION, SOLUTION INTRAVENOUS at 11:14

## 2024-06-05 RX ADMIN — QUETIAPINE FUMARATE 25 MG: 50 TABLET ORAL at 20:03

## 2024-06-05 RX ADMIN — DEXMEDETOMIDINE HYDROCHLORIDE 0.4 MCG/KG/HR: 400 INJECTION INTRAVENOUS at 21:16

## 2024-06-05 RX ADMIN — HALOPERIDOL LACTATE 2 MG: 5 INJECTION, SOLUTION INTRAMUSCULAR at 13:39

## 2024-06-05 ASSESSMENT — PAIN SCALES - GENERAL: PAINLEVEL_OUTOF10: 5

## 2024-06-05 NOTE — PROGRESS NOTES
Patient welcomed prayer but did not engage in conversation; got medical update from LEELA Mendoza     06/05/24 5583   Encounter Summary   Encounter Overview/Reason Spiritual/Emotional Needs   Service Provided For Patient   Referral/Consult From Rounding   Last Encounter  06/05/24   Complexity of Encounter Moderate   Spiritual/Emotional needs   Type Spiritual Support   Assessment/Intervention/Outcome   Assessment Impaired social interaction;Unable to assess   Intervention Prayer (assurance of)/Warrior;Sustaining Presence/Ministry of presence   Outcome Receptive

## 2024-06-05 NOTE — PROGRESS NOTES
ICU Progress Note (Non-Vent)  [facility] Critical Care Special[facility]    Patient - Kavya De Santiago,  Age - 64 y.o.    - 1960      Room Number -    MRN -  [id number]   [facility] # - [id number]  Date of Admission -  2024  8:51 AM    Events of Past 24 Hours     Confused again today, says that she is going to go home then she said that she was going to go to [place].  Blood pressure elevated this morning    Vitals    height is 1.575 m (5' 2\") and weight is 99 kg (218 lb 4.1 oz). Her temperature is 99.3 °F (37.4 °C). Her blood pressure is 177/90 (abnormal) and her pulse is 87. Her respiration is 17 and oxygen saturation is 96%.       Temperature Range: Temp: 99.3 °F (37.4 °C) Temp  Av.5 °F (37.5 °C)  Min: 98.6 °F (37 °C)  Max: 100 °F (37.8 °C)  BP Range:  Systolic (24hrs), Av , Min:119 , Max:187     Diastolic (24hrs), Av, Min:64, Max:103    Pulse Range: Pulse  Av.9  Min: 71  Max: 91  Respiration Range: Resp  Av.7  Min: 11  Max: 28  Current Pulse Ox::  SpO2: 96 %  24HR Pulse Ox Range:  SpO2  Av %  Min: 78 %  Max: 98 %  Oxygen Amount and Delivery: O2 Flow Rate (L/min): 2 L/min    Wt Readings from Last 3 Encounters:   24 99 kg (218 lb 4.1 oz)   24 91.4 kg (201 lb 9.6 oz)   10/03/23 90.1 kg (198 lb 9.6 oz)     I/O     Intake/Output Summary (Last 24 hours) at 2024 0824  Last data filed at 2024 0600  Gross per 24 hour   Intake 692.09 ml   Output 2325 ml   Net -1632.91 ml       DRAIN/TUBE OUTPUT       Invasive Lines   ICP PRESSURE RANGE  No data recorded  CVP PRESSURE RANGE  No data recorded      Medications      carbamide peroxide  5 drop Both Ears BID    hydrALAZINE  50 mg Oral 3 times per day    metoprolol tartrate  100 mg Oral BID    lisinopril  40 mg Oral Daily    levETIRAcetam  500 mg IntraVENous Q12H    busPIRone  10 mg Per NG tube TID    insulin glargine  20 Units  06/05/2024 05:25 AM     06/05/2024 05:25 AM    MCV 86.1 06/05/2024 05:25 AM    MCH 27.3 06/05/2024 05:25 AM    MCHC 31.8 06/05/2024 05:25 AM    RDW 13.8 06/05/2024 05:25 AM    LYMPHOPCT 17 06/05/2024 05:25 AM    MONOPCT 11 06/05/2024 05:25 AM    EOSPCT 3 06/05/2024 05:25 AM    BASOPCT 1 06/05/2024 05:25 AM    MONOSABS 1.50 06/05/2024 05:25 AM    LYMPHSABS 2.22 04/11/2023 10:48 AM    EOSABS 0.40 06/05/2024 05:25 AM    BASOSABS 0.10 06/05/2024 05:25 AM    DIFFTYPE NOT REPORTED 07/21/2021 10:15 AM       BMP   Lab Results   Component Value Date/Time     06/05/2024 04:06 AM    K 3.7 06/05/2024 04:06 AM     06/05/2024 04:06 AM    CO2 26 06/05/2024 04:06 AM    BUN 9 06/05/2024 04:06 AM    CREATININE 0.6 06/05/2024 04:06 AM    GLUCOSE 183 06/05/2024 04:06 AM    GLUCOSE 137 05/19/2012 12:05 PM    MG 1.9 06/04/2024 03:46 AM    PHOS 4.1 04/11/2023 10:48 AM       LFTS  Lab Results   Component Value Date/Time    ALKPHOS 184 06/03/2024 04:43 AM    ALT 54 06/03/2024 04:43 AM    AST 34 06/03/2024 04:43 AM    PROT 6.7 05/19/2012 12:05 PM    BILITOT 0.2 06/03/2024 04:43 AM    BILIDIR 0.1 06/03/2024 04:43 AM    IBILI 0.1 06/03/2024 04:43 AM       ABG ABGs:   Lab Results   Component Value Date/Time    PHART 7.464 06/02/2024 04:54 AM    PO2ART 123.4 06/02/2024 04:54 AM    ZZZ5NOL 41.4 06/02/2024 04:54 AM       Lab Results   Component Value Date/Time    MODE PRVC 06/02/2024 04:54 AM         INR  No results for input(s): \"PROTIME\", \"INR\" in the last 72 hours.    APTT  No results for input(s): \"APTT\" in the last 72 hours.    Lactic Acid  Lab Results   Component Value Date/Time    LACTA 1.1 05/27/2024 03:37 PM    LACTA 1.7 05/26/2024 09:09 PM    LACTA 3.4 05/26/2024 06:36 PM        BNP   No results for input(s): \"BNP\" in the last 72 hours.     Cultures       Radiology     MRI findings    IMPRESSION:  1. Tiny acute to subacute acute infarct in the left posterior frontal lobe.  2. No definite MR evidence of

## 2024-06-05 NOTE — PROGRESS NOTES
Physical Therapy  Facility/Department: [facility] ICU  Daily Treatment Note  NAME: Kavya De Santiago  : 1960  MRN: [id number]    Date of Service: 2024    Discharge Recommendations:  Therapy recommended at discharge        Patient Diagnosis(es): The primary encounter diagnosis was Colitis. Diagnoses of Acute respiratory failure, unspecified whether with hypoxia or hypercapnia (HCC), Hypomagnesemia, and Shortness of breath were also pertinent to this visit.    Assessment   Activity Tolerance: Treatment limited secondary to decreased cognition     Plan    Physical Therapy Plan  General Plan: 5-7 times per week  Specific Instructions for Next Treatment: attempt gait  Current Treatment Recommendations: Strengthening;ROM;Balance training;Functional mobility training;Transfer training;Gait training;Cognitive/Perceptual training;Safety education & training;Patient/Caregiver education & training;Positioning;Therapeutic activities     Restrictions  Restrictions/Precautions  Restrictions/Precautions: Fall Risk, General Precautions  Required Braces or Orthoses?: No  Implants present? :  ( denies)     Subjective    Subjective  Subjective: Pt alert in bed; very confused and followed minimal commands. Pt remained cooperative with very delayed processing.  Pain: denies  Orientation  Overall Orientation Status: Impaired  Orientation Level: Oriented to person;Disoriented to place;Disoriented to time;Disoriented to situation      Objective     Bed Mobility Training  Bed Mobility Training: Yes  Rolling: Assist X2;Maximum assistance  Supine to Sit: Assist X2;Maximum assistance (increased time to complete, pt gave no effort to assist with mobility despite max VC/TCs)  Scooting: Maximum assistance;Assist X1  Balance  Sitting: Impaired (pt sat EOB 20 minutes with max A initially, pt progressed to CGA with time)  Sitting - Static: Fair (occasional)  Sitting - Dynamic: Poor (constant support)  Standing: Impaired  Standing -  Static: Poor (pt stood once at RW for 30'' with heavy posterior lean, minAx2 at all times to maintain standing balance. Pt stood again with jacque jennifer with CGA x2)  Standing - Dynamic: Poor  Transfer Training  Transfer Training: Yes  Overall Level of Assistance: Maximum assistance;Assist X2  Sit to Stand: Maximum assistance;Assist X2 (completed sit to stand twice at RW, completed again with jacque stedy, max VC/TCs given to elicit participation)  Stand to Sit: Minimum assistance;Assist X1  Bed to Chair: Total assistance (jacque stedy to chair)  Gait  Gait Training: No (pt unable to take steps with RW due to confusion)     PT Exercises  PROM Exercises: ankle pumps x 10 reps  A/AROM Exercises: Seated BLE LAQs and hip flexion; 10 x each with max VC/TCs to elicit participation and to stay on task     Safety Devices  Type of Devices: All fall risk precautions in place;Call light within reach;Chair alarm in place;Gait belt;Patient at risk for falls;Left in chair;Nurse notified       Goals  Short Term Goals  Time Frame for Short Term Goals: 6-8 visits  Short Term Goal 1: Pt to demo All bed mobility with Giovanni/CGA with cueing as needed  Short Term Goal 2: Pt to demo safe functional transfers with RW and MinAx1  Short Term Goal 3: Pt to ambulate 50' with RW and Giovanni + chair follow for safety  Short Term Goal 4: Pt to engage in 25+ minutes of therapeutic activity/exercise to improve functional endurance  Patient Goals   Patient Goals : Pt does not verbally state    Education  Patient Education  Education Given To: Patient;Family  Education Provided: Role of Therapy;Plan of Care;Transfer Training;Equipment;Fall Prevention Strategies;Home Exercise Program  Education Method: Demonstration;Verbal  Barriers to Learning: Cognition;Hearing  Education Outcome: Continued education needed    AM-PAC - Mobility    AM-PAC Basic Mobility - Inpatient   How much help is needed turning from your back to your side while in a flat bed without using

## 2024-06-05 NOTE — CARE COORDINATION
Awaiting decision on acceptance to [facility]. Patients insurance benefits will need verified if accepted.

## 2024-06-05 NOTE — PROGRESS NOTES
[facility]   INPATIENT OCCUPATIONAL THERAPY  PROGRESS NOTE  Date: 2024  Patient Name: Kavya De Santiago       Room:   MRN: [id number]    : 1960  (64 y.o.)  Gender: female      Diagnosis: Colitis      Discharge Recommendations:  Further Occupational Therapy is recommended upon facility discharge.    OT Equipment Recommendations  Other: TBD    Restrictions/Precautions  Restrictions/Precautions  Restrictions/Precautions: Fall Risk;General Precautions  Required Braces or Orthoses?: No  Implants present? :  ( denies)    O2 Device: Nasal cannula    Subjective  Subjective  Subjective: \"Kayla will be mad.\" Pt demonstrated increased confusion with decreased participation on this date.  Subjective  Pain: Pt did not report pain  Comments: Ok per LEELA Mendoza for OT/PT co-tx    Objective  Orientation  Overall Orientation Status: Impaired  Orientation Level: Disoriented to time;Disoriented to place;Oriented to person  Cognition  Overall Cognitive Status: Exceptions  Arousal/Alertness: Delayed responses to stimuli;Inconsistent responses to stimuli  Following Commands: Follows one step commands with increased time;Follows one step commands with repetition;Inconsistently follows commands  Attention Span: Difficulty attending to directions  Memory: Decreased short term memory;Decreased recall of recent events  Safety Judgement: Impaired;Decreased awareness of need for assistance;Decreased awareness of need for safety  Problem Solving: Impaired;Assistance required to generate solutions;Assistance required to implement solutions;Assistance required to identify errors made;Assistance required to correct errors made;Decreased awareness of errors  Insights: Decreased awareness of deficits  Initiation: Requires cues for all  Sequencing: Requires cues for all    Activities of Daily Living  ADL  Feeding: Maximum assistance, Based on clinical judgement  Feeding Skilled Clinical Factors:  position. Pt required 2 person A with jacque rodriguez to complete transfer to early mobility chair.    Functional Mobility  Functional Mobility Comments: GENA at this time due to increaesd confusion with pt unable to follow commands. Use of jacque stedy to complete transfer to chair       Patient Education  Patient Education  Education Given To: Patient, Family  Education Provided: Role of Therapy, Plan of Care, Transfer Training  Education Method: Verbal  Barriers to Learning: Cognition, Hearing  Education Outcome: Unable to demonstrate understanding, Continued education needed    Goals  Short Term Goals  Time Frame for Short Term Goals: By discharge  Short Term Goal 1: Pt will complete bed mobility with Mod A to sitting EOB for 15+ mintues during functional activity of choice unsupported with SBA  Short Term Goal 2: Pt will complete self feeding task/grooming with set-up A and Good attention to task  Short Term Goal 3: Pt will complete upper body dressing/bathing with SBA and Good safety/attention to task  Short Term Goal 4: Pt will complete lower body dressing/bathing with Max A x 1 and Good safety with use of AE as needed  Short Term Goal 5: Pt will complete functional transfers/mobility during self care tasks with Mod A and Good safety with use of least restrictive device  Short Term Goal 6: Pt will follow simple 2-3 step commands 80% of the time to increase participation in daily activities  Short Term Goal 7: Pt will participate in 15+ minutes of therapeutic exercises/functional activities to increase safety and independence with self care and mobility  Occupational Therapy Plan  Times Per Week: 5-7  Current Treatment Recommendations: Self-Care / ADL, Strengthening, ROM, Balance training, Functional mobility training, Endurance training, Cognitive reorientation, Pain management, Safety education & training, Patient/Caregiver education & training, Equipment evaluation, education, & procurement, Home management

## 2024-06-05 NOTE — PROGRESS NOTES
SLP ALL NOTES  Speech Language Pathology  [facility]    Speech Language Treatment Note    Date: 6/5/2024  Patient’s Name: Kavya De Santiago  MRN: [id number]  Diagnosis:   Patient Active Problem List   Diagnosis Code    Cervical spondylosis M47.812    Type 2 diabetes mellitus without complication, without long-term current use of insulin ([facility]) E11.9    Hypertension I10    Abnormal auditory perception H93.299    Nasal polyps J33.9    Osteoarthritis of left knee M17.12    Osteoarthritis of right knee M17.11    DIEGO (nonalcoholic steatohepatitis) K75.81    Vitamin D deficiency E55.9    Iron deficiency anemia D50.9    Dyslipidemia E78.5    Trochanteric bursitis M70.60    Chronic left shoulder pain M25.512, G89.29    Restless legs syndrome G25.81    Generalized anxiety disorder F41.1    Acute encephalopathy G93.40    Class 2 obesity in adult E66.9    Leg edema R60.0    Recurrent major depressive disorder ([facility]) F33.9    Dermatitis L30.9    Retrolisthesis of vertebrae M43.10    Bilateral carpal tunnel syndrome G56.03    Intention tremor G25.2    Sciatica M54.30    Back pain with radiation M54.9    Left sciatic nerve pain M54.32    Lumbar disc disease with radiculopathy M51.16    Intractable back pain M54.9    Piriformis syndrome of left side G57.02    Ulnar neuropathy at elbow, left G56.22    S/P decompression of ulnar nerve at elbow Z98.890    Cervical myelopathy with cervical radiculopathy ([facility]) G95.9, M54.12    Lumbar spondylosis M47.816    Right median nerve neuropathy G56.11    Sacroiliitis ([facility]) M46.1    Secondary diabetes mellitus with stage 2 chronic kidney disease (GFR 60-89) ([facility]) E13.22, N18.2    Benign hypertension with CKD (chronic kidney disease) stage III ([facility]) I12.9, N18.30    Severe obesity (BMI 35.0-39.9) with comorbidity (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    Bacteremia R78.81    Sepsis due to Haemophilus influenzae with acute respiratory failure without septic shock ([facility]) A41.3, R65.20, J96.00    Anemia D64.9    Elevated LFTs R79.89    Unresponsive state R41.89    Meningoencephalitis 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 Unable to follow simple one step directions or answer simple questions re: self and immediate environment.      Verbal Expression: Pt.named objects around room c 100% with long response latency.  Pt. Unable to state object function.   Pt. Often stating random, unrelated statements t/o session to ST.    Other: Orientation:   Pt. Is not oriented to place, reason or date, but oriented to name of hospital, month and year.    Pt. Very fidgety in bed and has poor attention to task.  Pt. Very internally distracted, kept asking her , \"can I see your phone, I need to make sure Melinda is okay.\"   Pt. Required MAX cues from ST and her  to attend to task.  Pt. Demo. No overt s/s aspiration c sips of thin liquids via straw and potato chips.  Pt. Declined ST offer to reheat her yash on lunch tray.   ST educ. Pt.  re: receptive language (comprehension)  deficit.       Plan:  [x] Continue ST services    [] Discharge from ST:      Discharge recommendations: []  Further therapy recommended at discharge.The patient should be able to tolerate at least 3 hours of therapy per day over 5 days or 15 hours over 7 days. [] Further therapy recommended

## 2024-06-05 NOTE — PLAN OF CARE
Problem: Safety - Adult  Goal: Free from fall injury  Outcome: Progressing     Problem: Discharge Planning  Goal: Discharge to home or other facility with appropriate resources  Outcome: Progressing     Problem: Pain  Goal: Verbalizes/displays adequate comfort level or baseline comfort level  Outcome: Progressing     Problem: Skin/Tissue Integrity  Goal: Absence of new skin breakdown  Description: 1.  Monitor for areas of redness and/or skin breakdown  2.  Assess vascular access sites hourly  3.  Every 4-6 hours minimum:  Change oxygen saturation probe site  4.  Every 4-6 hours:  If on nasal continuous positive airway pressure, respiratory therapy assess nares and determine need for appliance change or resting period.  Outcome: Progressing     Problem: ABCDS Injury Assessment  Goal: Absence of physical injury  Outcome: Progressing     Problem: Cardiovascular - Adult  Goal: Maintains optimal cardiac output and hemodynamic stability  Outcome: Progressing  Goal: Absence of cardiac dysrhythmias or at baseline  Outcome: Progressing

## 2024-06-05 NOTE — PLAN OF CARE
Problem: Safety - Adult  Goal: Free from fall injury  6/4/2024 2137 by Alexandra Borrero RN  Outcome: Progressing  6/4/2024 1656 by Makeda Sprague RN  Outcome: Progressing  Flowsheets (Taken 6/4/2024 0800)  Free From Fall Injury: Instruct family/caregiver on patient safety     Problem: Discharge Planning  Goal: Discharge to home or other facility with appropriate resources  6/4/2024 2137 by Alexandra Borrero RN  Outcome: Progressing  6/4/2024 1656 by Makeda Sprague RN  Outcome: Progressing  Flowsheets  Taken 6/4/2024 1600  Discharge to home or other facility with appropriate resources: Identify barriers to discharge with patient and caregiver  Taken 6/4/2024 1200  Discharge to home or other facility with appropriate resources: Identify barriers to discharge with patient and caregiver  Taken 6/4/2024 0800  Discharge to home or other facility with appropriate resources: Identify barriers to discharge with patient and caregiver     Problem: Pain  Goal: Verbalizes/displays adequate comfort level or baseline comfort level  6/4/2024 2137 by Alexandra Borrero RN  Outcome: Progressing  6/4/2024 1656 by Makeda Sprague RN  Outcome: Progressing  Flowsheets  Taken 6/4/2024 1600  Verbalizes/displays adequate comfort level or baseline comfort level:   Encourage patient to monitor pain and request assistance   Assess pain using appropriate pain scale   Administer analgesics based on type and severity of pain and evaluate response  Taken 6/4/2024 1200  Verbalizes/displays adequate comfort level or baseline comfort level:   Encourage patient to monitor pain and request assistance   Assess pain using appropriate pain scale   Implement non-pharmacological measures as appropriate and evaluate response  Taken 6/4/2024 0800  Verbalizes/displays adequate comfort level or baseline comfort level:   Encourage patient to monitor pain and request assistance   Assess pain using appropriate pain scale   Administer analgesics based on type and severity  manageable levels: Administer medication as ordered  Taken 6/4/2024 1200  Will report anxiety at manageable levels: Administer medication as ordered  Taken 6/4/2024 0800  Will report anxiety at manageable levels: Administer medication as ordered     Problem: Coping  Goal: Pt/Family able to verbalize concerns and demonstrate effective coping strategies  Description: INTERVENTIONS:  1. Assist patient/family to identify coping skills, available support systems and cultural and spiritual values  2. Provide emotional support, including active listening and acknowledgement of concerns of patient and caregivers  3. Reduce environmental stimuli, as able  4. Instruct patient/family in relaxation techniques, as appropriate  5. Assess for spiritual pain/suffering and initiate Spiritual Care, Psychosocial Clinical Specialist consults as needed  6/4/2024 2137 by Alexandra Borrero, RN  Outcome: Progressing  6/4/2024 1656 by Makeda Sprague, RN  Outcome: Progressing  Flowsheets  Taken 6/4/2024 1600  Patient/family able to verbalize anxieties, fears, and concerns, and demonstrate effective coping:   Assist patient/family to identify coping skills, available support systems and cultural and spiritual values   Provide emotional support, including active listening and acknowledgement of concerns of patient and caregivers  Taken 6/4/2024 1200  Patient/family able to verbalize anxieties, fears, and concerns, and demonstrate effective coping:   Assist patient/family to identify coping skills, available support systems and cultural and spiritual values   Provide emotional support, including active listening and acknowledgement of concerns of patient and caregivers  Taken 6/4/2024 0800  Patient/family able to verbalize anxieties, fears, and concerns, and demonstrate effective coping:   Assist patient/family to identify coping skills, available support systems and cultural and spiritual values   Provide emotional support, including active

## 2024-06-05 NOTE — PROGRESS NOTES
[facility] -   Infectious diseases evaluation  admission date 5/26/2024    reason for consultation:   High fever, unknown etiology sepsis    Impression :   Current:  Haemophilus influenza meningitis/ bacteremia  Acute /subacute infarct in the left posterior frontal lobe   Sepsis   Colitis  Diabetes mellitus  Chronic kidney disease  Hyperlipidemia  Hypertension  GERD  Pagan      Discussion:  Status post lumbar puncture 5/29, cloudy fluid, 280 WBC, meningitis panel was positive for haemophilus influenza.      HENCE:   Continue Ceftriaxone 2 gm IV every 12 hours   No obvious vegetations per 2 d echo.   Discussed with cardiology /EMANUEL planned for tomorrow  Follow CBC and renal function closely.  Vancomycin 125 mg po every 6 hours  No growth on blood cultures from 5/28/2024.  Supportive care.        Infection Control Recommendations   Rock Precautions      Antimicrobial Stewardship Recommendations   Simplification of therapy  Targeted therapy      History of Present Illness:   Initial history:  Kavya De Santiago is a 64 y.o.-year-old female was brought to the hospital for altered mental status associated with nausea, vomiting and diarrhea.  The patient is intubated, unable to provide history that was obtained from chart review and  at the bedside had upper respiratory symptoms, congestion, headache and rhinorrhea for 1 week associated with dry cough and decreased appetite.  The patient was found on the ground on the day of admission, EMS called.  The patient was intubated at the ER.  The patient was hypothermic initially then has been running high-grade fever with temperature max of 105, hypotensive, on pressors.  CT head showed no acute abnormality, CTA chest showed no evidence of PE, CT abdomen pelvis showed pancolitis with abnormal wall thickening of the ascending, transverse, descending colon, sigmoid colon and rectum.   Initial lactic acid 3.3, WBC 11.5,

## 2024-06-05 NOTE — PROGRESS NOTES
Physical Medicine & Rehabilitation  Progress Note    2024 2:00 PM     CC: Ambulatory and ADL dysfunction due to subacute infarct left frontal lobe complicated with encephalopathy     Pulmonary-acute hypoxic respite failure extubated , sepsis secondary H. influenzae bacteremia meningitis, left posterior frontal lobe CVA chronic back pain follows with pain management and neurosurgery obstruct sleep apnea multiple sleep studies ordered but never completed chronic pain and depression    Internal medicine neurology recommending EMANUEL, colitis complete Vanco for possible C. difficile, blood pressure medications adjusted    Neurology-acute encephalopathy in setting of haemophilus influenza meningitis steadily improving, small acute/subacute left posterior frontal infarct appears cortical/embolic infarct essentially asymptomatic recommend EMANUEL to evaluate for endocarditis continue aspirin and Lipitor and    Subjective:   No complaints sitting in chair.   present    ROS:  Denies fevers, chills, sweats.  No chest pain, palpitations, lightheadedness.  Denies coughing, wheezing or shortness of breath.  Denies abdominal pain, nausea, diarrhea or constipation.  No new areas of joint pain.  Denies new areas of numbness or weakness.  Denies new anxiety or depression issues.  No new skin problems.    Rehabilitation:   PT:      Bed mobility  Rolling to Left: 2 Person assistance, Maximum assistance  Rolling to Right: Moderate assistance  Supine to Sit: 2 Person assistance, Maximum assistance  Sit to Supine: 2 Person assistance, Maximum assistance  Scootin Person assistance, Maximal assistance  Bed Mobility Comments: Bed mobility completed with HOB elevated. Max verbal/tactile cues with Poor carryover noted at this time. Minimal participation on this date due to increased confusion    Transfers  Sit to Stand: Moderate Assistance  Stand to Sit: Minimal Assistance  Bed to Chair: 2 Person Assistance (ModAx2)  Stand Pivot  out of bed at times.  Occasional agitation has been receiving Haldol occasionally  - EMANUEL pending  -Patient receiving IV labetalol-needs to be off IV blood pressure medication     6. DVT proph: Lucie Ortiz MD     Electronically signed by Jose Ortiz MD on 6/5/2024 at 2:00 PM     This note is created with the assistance of a speech recognition program.  While intending to generate a document that actually reflects the content of the visit, the document can still have some errors including those of syntax and sound a like substitutions which may escape proof reading.  In such instances, actual meaning can be extrapolated by contextual diversion

## 2024-06-05 NOTE — PROGRESS NOTES
[facility] Neurology   IN-PATIENT SERVICE      NEUROLOGY PROGRESS  NOTE            Date:   6/5/2024  Patient name:  Kavya De Santiago  Date of admission:  5/26/2024  YOB: 1960      Interval History:     6/5: Has been having some waxing and waning mentation, continues to say that she is supposed to go to [facility] this weekend.  Intermittently restless/agitated.  No episodes of staring or seizure-like activity.    6/4: Had MRI brain done yesterday.  Continues to have improvement in mentation.  No new neurological deficits.  Working with PT/OT/ST.    6/3: No acute events.  Was extubated yesterday.  This morning, per nursing staff and  at bedside, mentation improved.  She is able to answer questions although slow to respond.  No seizures or seizure-like activity.    History of Present Illness:     Per my partner:    Briefly, this is a  64 y.o. female with hx of HTN, DM, DIEGO and CKD was admitted on 5/26/2024 with altered mentation.  Initial history obtained from EMS report and patient's .  EMS report states that they were called to the patient's house for c/o nausea and vomiting and upon evaluation by EMS noted to be with abnormal glucose concerning for DKA.  While transporting the patient to the hospital; she became completely unresponsive with O2 saturations dropping for which patient was placed on supraglottic airway.  She was hypertensive on arrival with blood pressures with systolic around 160 mm Hg.  Patient was also noted to be febrile with Tmax 104 °F.  CT head on admit is negative for acute intracranial abnormalities.  Spinal fluid studies done and does help significantly abnormal with elevated CSF protein and neutrophilic pleocytosis with meningitis panel positive for H influenza for which patient has been on IV ceftriaxone and vancomycin.  Patient remained on vent since admitted.  Weaning trials were done and patient was off of sedation for several hours today.  Patient was  sedation.   On asa 81 mg. Continue for now (was off for 6 days during hospitalization).  Continue home lipitor 40 mg. Obtain lipid panel, A1c.  Goal blood pressure normotensive.  Continue Keppra 500 mg twice daily.  Continue antibiotics as per ID recs.  Limit sedative medications. Obtain EKG. If Qtc wnl, would start seroquel 25 mg qhs to help with day/night cycling.  PT/OT/ST. Will need placement, possible LTACH vs ARU.  Treatment of underlying medical conditions as per primary team  Non-pharmacological methods to improve delirium include blinds up, TV on during the day, allow to sleep at night, frequent reorientation, etc.   Reorientation strategies (family pictures, well lit room, family reassurance)   updated at bedside.    We will continue to follow. Discussed with ICU, ID and primary teams.    Electronically signed by Jolynn Ferguson DO on 6/5/2024 at 2:18 PM      Jolynn Ferguson DO  [facility] [place]  Neurology

## 2024-06-05 NOTE — PROGRESS NOTES
[facility]  IN-PATIENT SERVICE  [facility]    PROGRESS NOTE             6/5/2024    7:06 AM    Name:   Kavya De Santiago  MRN:     [id number]     Acct:      [id number]   Room:   2003/2003-01  IP Day:  10  Admit Date:  5/26/2024  8:51 AM    PCP:  Shaina Hamilton MD  Code Status:  Full Code    Subjective:     C/C:   Chief Complaint   Patient presents with    Loss of Consciousness     Interval History Status: Significantly improved  Patient is seen and examined at bedside, she is extubated 06/02/24  NG tube also taken out yesterday, tolerating clear diet well  Patient is not oriented to time place and person, she is confused, patient had an episode of agitation last night, fentanyl was given 1 time, Haldol as needed was ordered by Dr. Asencio.  Patient is also hallucinating this morning, she told me he fell and Erica out to take advantage of her.  Neurology and ID recommended EMANUEL yesterday, Dr. Zelaya was also taken on board.  Will wait for cardiology recommendations.  Patient WBC count today is 14, she also have fever 99.6, patient has NG in, order to clamp NG and give a voiding trial.  Will also advance diet.  Will PerfectServe Dr. Zelaya regarding EMANUEL      Brief History:     64-year-old female with history of T2DM, HTN, HLD, CKD, GERD, DIEGO, obesity who was brought to the ED for AMS, nausea, vomiting, diarrhea. Patient is intubated at the time of my visit, her  Rj is present to answer questions. Reportedly patient had some upper respiratory symptoms over the past week or so, with a dry cough, decreased appetite, rhinorrhea, congestion, headache/sinus pain. This morning at 4 AM patient began to have diarrhea,  is unsure if it was bloody or not, and she also had several episodes of vomiting. When he checked on her he found her on the ground, EMS was called, they arrived she has a GCS was 8-9, O2 saturations dropped on the way to the ED, given  etomidate and Versed and placed a supraglottic airway, on arrival to the ED she was intubated. CT head showed no acute abnormality, CTA chest showed no evidence of PE, CT abdomen pelvis showed pancolitis with abnormal wall thickening of the ascending, transverse, descending colon, sigmoid colon and rectum. Lactate elevated 3.3. Magnesium 0.9, replaced. Leukocytosis with WBC 11.5. Started on piperacillin/tazobactam. Chest x-ray showed left basilar atelectasis/scarring versus infiltrate and/or small effusion. Blood sugar elevated 439 on arrival, ABG showed no acidosis, anion gap normal, slightly elevated beta hydroxybutyrate 0.29. COVID-19 and influenza swab negative. Blood cultures collected and are pending. Troponin normal. Acetaminophen and salicylate levels normal.  reports that she is a non-smoker, drinks alcohol infrequently, no drug use. Was not complaining of any chest pain, SOB.     Review of Systems:     Review of Systems   Constitutional:  Negative for diaphoresis.   Respiratory:  Negative for shortness of breath and wheezing.    Cardiovascular:  Negative for leg swelling.   Gastrointestinal:  Negative for abdominal pain.         Medications:     Allergies:    Allergies   Allergen Reactions    Codeine Nausea Only       Current Meds:   Scheduled Meds:    carbamide peroxide  5 drop Both Ears BID    hydrALAZINE  50 mg Oral 3 times per day    metoprolol tartrate  100 mg Oral BID    lisinopril  40 mg Oral Daily    levETIRAcetam  500 mg IntraVENous Q12H    busPIRone  10 mg Per NG tube TID    insulin glargine  20 Units SubCUTAneous Daily    miconazole   Topical BID    enoxaparin  30 mg SubCUTAneous BID    vancomycin  125 mg Oral 4 times per day    rOPINIRole  2 mg Oral BID    pantoprazole (PROTONIX) 40 mg in sodium chloride (PF) 0.9 % 10 mL injection  40 mg IntraVENous Daily    insulin lispro  0-8 Units SubCUTAneous Q4H    cefTRIAXone (ROCEPHIN) IV  2,000 mg IntraVENous Q12H    aspirin  81 mg Oral Daily

## 2024-06-06 ENCOUNTER — APPOINTMENT (OUTPATIENT)
Dept: GENERAL RADIOLOGY | Age: 64
End: 2024-06-06
Payer: COMMERCIAL

## 2024-06-06 ENCOUNTER — TELEPHONE (OUTPATIENT)
Dept: ORTHOPEDIC SURGERY | Age: 64
End: 2024-06-06

## 2024-06-06 ENCOUNTER — HOSPITAL ENCOUNTER (OUTPATIENT)
Age: 64
Discharge: HOME | End: 2024-06-06

## 2024-06-06 ENCOUNTER — APPOINTMENT (OUTPATIENT)
Dept: CT IMAGING | Age: 64
End: 2024-06-06
Payer: COMMERCIAL

## 2024-06-06 LAB
AMMONIA PLAS-SCNC: 20 UMOL/L (ref 11–51)
ANION GAP SERPL CALCULATED.3IONS-SCNC: 14 MMOL/L (ref 9–17)
BASOPHILS # BLD: 0.1 K/UL (ref 0–0.2)
BASOPHILS NFR BLD: 1 % (ref 0–2)
BILIRUB UR QL STRIP: NEGATIVE
BUN SERPL-MCNC: 12 MG/DL (ref 8–23)
CALCIUM SERPL-MCNC: 8.8 MG/DL (ref 8.6–10.4)
CHLORIDE SERPL-SCNC: 108 MMOL/L (ref 98–107)
CLARITY UR: ABNORMAL
CO2 SERPL-SCNC: 26 MMOL/L (ref 20–31)
COLOR UR: YELLOW
CREAT SERPL-MCNC: 0.8 MG/DL (ref 0.5–0.9)
CRP SERPL HS-MCNC: 19.2 MG/L (ref 0–5)
EKG Q-T INTERVAL: 286 MS
EKG QRS DURATION: 68 MS
EKG QTC CALCULATION (BAZETT): 347 MS
EKG R AXIS: -17 DEGREES
EKG T AXIS: 2 DEGREES
EKG VENTRICULAR RATE: 89 BPM
EOSINOPHIL # BLD: 0.5 K/UL (ref 0–0.4)
EOSINOPHILS RELATIVE PERCENT: 3 % (ref 0–4)
EPI CELLS #/AREA URNS HPF: ABNORMAL /HPF
ERYTHROCYTE [DISTWIDTH] IN BLOOD BY AUTOMATED COUNT: 14 % (ref 11.5–14.9)
GFR, ESTIMATED: 83 ML/MIN/1.73M2
GLUCOSE BLD-MCNC: 121 MG/DL (ref 65–105)
GLUCOSE BLD-MCNC: 131 MG/DL (ref 65–105)
GLUCOSE BLD-MCNC: 146 MG/DL (ref 65–105)
GLUCOSE BLD-MCNC: 147 MG/DL (ref 65–105)
GLUCOSE SERPL-MCNC: 157 MG/DL (ref 70–99)
GLUCOSE UR STRIP-MCNC: NEGATIVE MG/DL
HCT VFR BLD AUTO: 30.2 % (ref 36–46)
HGB BLD-MCNC: 9.6 G/DL (ref 12–16)
HGB UR QL STRIP.AUTO: NEGATIVE
KETONES UR STRIP-MCNC: ABNORMAL MG/DL
LACTATE BLDV-SCNC: 1 MMOL/L (ref 0.5–2.2)
LEUKOCYTE ESTERASE UR QL STRIP: ABNORMAL
LKM AB TITR SER IF: NORMAL {TITER}
LYMPHOCYTES NFR BLD: 2.6 K/UL (ref 1–4.8)
LYMPHOCYTES RELATIVE PERCENT: 19 % (ref 24–44)
MCH RBC QN AUTO: 26.9 PG (ref 26–34)
MCHC RBC AUTO-ENTMCNC: 31.9 G/DL (ref 31–37)
MCV RBC AUTO: 84.2 FL (ref 80–100)
MONOCYTES NFR BLD: 1.7 K/UL (ref 0.1–1.3)
MONOCYTES NFR BLD: 12 % (ref 1–7)
NEUTROPHILS NFR BLD: 65 % (ref 36–66)
NEUTS SEG NFR BLD: 8.9 K/UL (ref 1.3–9.1)
NITRITE UR QL STRIP: NEGATIVE
PH UR STRIP: 7 [PH] (ref 5–8)
PLATELET # BLD AUTO: 399 K/UL (ref 150–450)
PMV BLD AUTO: 7 FL (ref 6–12)
POTASSIUM SERPL-SCNC: 3.2 MMOL/L (ref 3.7–5.3)
POTASSIUM SERPL-SCNC: 3.3 MMOL/L (ref 3.7–5.3)
POTASSIUM SERPL-SCNC: 3.9 MMOL/L (ref 3.7–5.3)
PROCALCITONIN SERPL-MCNC: 0.18 NG/ML
PROT UR STRIP-MCNC: ABNORMAL MG/DL
RBC # BLD AUTO: 3.59 M/UL (ref 4–5.2)
RBC #/AREA URNS HPF: ABNORMAL /HPF
SODIUM SERPL-SCNC: 148 MMOL/L (ref 135–144)
SP GR UR STRIP: 1.03 (ref 1–1.03)
UROBILINOGEN UR STRIP-ACNC: NORMAL EU/DL (ref 0–1)
WBC #/AREA URNS HPF: ABNORMAL /HPF
WBC OTHER # BLD: 13.8 K/UL (ref 3.5–11)
YEAST URNS QL MICRO: ABNORMAL

## 2024-06-06 PROCEDURE — 99233 SBSQ HOSP IP/OBS HIGH 50: CPT | Performed by: INTERNAL MEDICINE

## 2024-06-06 PROCEDURE — 80048 BASIC METABOLIC PNL TOTAL CA: CPT

## 2024-06-06 PROCEDURE — 85025 COMPLETE CBC W/AUTO DIFF WBC: CPT

## 2024-06-06 PROCEDURE — A4216 STERILE WATER/SALINE, 10 ML: HCPCS | Performed by: INTERNAL MEDICINE

## 2024-06-06 PROCEDURE — 6360000002 HC RX W HCPCS

## 2024-06-06 PROCEDURE — 70450 CT HEAD/BRAIN W/O DYE: CPT

## 2024-06-06 PROCEDURE — 6360000002 HC RX W HCPCS: Performed by: NURSE PRACTITIONER

## 2024-06-06 PROCEDURE — 71045 X-RAY EXAM CHEST 1 VIEW: CPT

## 2024-06-06 PROCEDURE — 2700000000 HC OXYGEN THERAPY PER DAY

## 2024-06-06 PROCEDURE — 36415 COLL VENOUS BLD VENIPUNCTURE: CPT

## 2024-06-06 PROCEDURE — C9113 INJ PANTOPRAZOLE SODIUM, VIA: HCPCS | Performed by: INTERNAL MEDICINE

## 2024-06-06 PROCEDURE — 2580000003 HC RX 258: Performed by: INTERNAL MEDICINE

## 2024-06-06 PROCEDURE — 6360000002 HC RX W HCPCS: Performed by: PSYCHIATRY & NEUROLOGY

## 2024-06-06 PROCEDURE — 6360000002 HC RX W HCPCS: Performed by: INTERNAL MEDICINE

## 2024-06-06 PROCEDURE — 0202U NFCT DS 22 TRGT SARS-COV-2: CPT

## 2024-06-06 PROCEDURE — 81001 URINALYSIS AUTO W/SCOPE: CPT

## 2024-06-06 PROCEDURE — 2000000000 HC ICU R&B

## 2024-06-06 PROCEDURE — 83605 ASSAY OF LACTIC ACID: CPT

## 2024-06-06 PROCEDURE — 99233 SBSQ HOSP IP/OBS HIGH 50: CPT | Performed by: PSYCHIATRY & NEUROLOGY

## 2024-06-06 PROCEDURE — 87040 BLOOD CULTURE FOR BACTERIA: CPT

## 2024-06-06 PROCEDURE — 86140 C-REACTIVE PROTEIN: CPT

## 2024-06-06 PROCEDURE — 51798 US URINE CAPACITY MEASURE: CPT

## 2024-06-06 PROCEDURE — 2580000003 HC RX 258: Performed by: PSYCHIATRY & NEUROLOGY

## 2024-06-06 PROCEDURE — 6370000000 HC RX 637 (ALT 250 FOR IP)

## 2024-06-06 PROCEDURE — 84145 PROCALCITONIN (PCT): CPT

## 2024-06-06 PROCEDURE — 2580000003 HC RX 258: Performed by: NURSE PRACTITIONER

## 2024-06-06 PROCEDURE — 94761 N-INVAS EAR/PLS OXIMETRY MLT: CPT

## 2024-06-06 PROCEDURE — 84132 ASSAY OF SERUM POTASSIUM: CPT

## 2024-06-06 PROCEDURE — 99291 CRITICAL CARE FIRST HOUR: CPT | Performed by: INTERNAL MEDICINE

## 2024-06-06 PROCEDURE — 82947 ASSAY GLUCOSE BLOOD QUANT: CPT

## 2024-06-06 PROCEDURE — 2500000003 HC RX 250 WO HCPCS: Performed by: INTERNAL MEDICINE

## 2024-06-06 PROCEDURE — 51701 INSERT BLADDER CATHETER: CPT

## 2024-06-06 RX ORDER — SODIUM CHLORIDE 450 MG/100ML
INJECTION, SOLUTION INTRAVENOUS CONTINUOUS
Status: ACTIVE | OUTPATIENT
Start: 2024-06-06 | End: 2024-06-06

## 2024-06-06 RX ORDER — SODIUM CHLORIDE 450 MG/100ML
INJECTION, SOLUTION INTRAVENOUS CONTINUOUS
Status: ACTIVE | OUTPATIENT
Start: 2024-06-06 | End: 2024-06-07

## 2024-06-06 RX ORDER — DIPHENHYDRAMINE HYDROCHLORIDE 50 MG/ML
25 INJECTION INTRAMUSCULAR; INTRAVENOUS ONCE
Status: COMPLETED | OUTPATIENT
Start: 2024-06-06 | End: 2024-06-06

## 2024-06-06 RX ORDER — HYDRALAZINE HYDROCHLORIDE 20 MG/ML
10 INJECTION INTRAMUSCULAR; INTRAVENOUS EVERY 6 HOURS PRN
Status: DISCONTINUED | OUTPATIENT
Start: 2024-06-06 | End: 2024-06-12

## 2024-06-06 RX ORDER — BISACODYL 10 MG
10 SUPPOSITORY, RECTAL RECTAL DAILY PRN
Status: DISCONTINUED | OUTPATIENT
Start: 2024-06-06 | End: 2024-06-14 | Stop reason: HOSPADM

## 2024-06-06 RX ADMIN — ANTI-FUNGAL POWDER MICONAZOLE NITRATE TALC FREE: 1.42 POWDER TOPICAL at 08:49

## 2024-06-06 RX ADMIN — ACETAMINOPHEN 650 MG: 650 SUPPOSITORY RECTAL at 03:04

## 2024-06-06 RX ADMIN — POTASSIUM CHLORIDE 10 MEQ: 7.46 INJECTION, SOLUTION INTRAVENOUS at 17:02

## 2024-06-06 RX ADMIN — POTASSIUM CHLORIDE 10 MEQ: 7.46 INJECTION, SOLUTION INTRAVENOUS at 20:05

## 2024-06-06 RX ADMIN — DEXMEDETOMIDINE HYDROCHLORIDE 1.3 MCG/KG/HR: 400 INJECTION INTRAVENOUS at 08:20

## 2024-06-06 RX ADMIN — DEXMEDETOMIDINE HYDROCHLORIDE 1.5 MCG/KG/HR: 400 INJECTION INTRAVENOUS at 00:37

## 2024-06-06 RX ADMIN — DEXMEDETOMIDINE HYDROCHLORIDE 1.3 MCG/KG/HR: 400 INJECTION INTRAVENOUS at 05:33

## 2024-06-06 RX ADMIN — DIPHENHYDRAMINE HYDROCHLORIDE 25 MG: 50 INJECTION, SOLUTION INTRAMUSCULAR; INTRAVENOUS at 04:36

## 2024-06-06 RX ADMIN — PANTOPRAZOLE SODIUM 40 MG: 40 INJECTION, POWDER, FOR SOLUTION INTRAVENOUS at 11:22

## 2024-06-06 RX ADMIN — Medication 5 DROP: at 21:06

## 2024-06-06 RX ADMIN — DEXMEDETOMIDINE HYDROCHLORIDE 1.5 MCG/KG/HR: 400 INJECTION INTRAVENOUS at 03:04

## 2024-06-06 RX ADMIN — POTASSIUM CHLORIDE 10 MEQ: 7.46 INJECTION, SOLUTION INTRAVENOUS at 18:47

## 2024-06-06 RX ADMIN — POTASSIUM CHLORIDE: 2 INJECTION, SOLUTION, CONCENTRATE INTRAVENOUS at 05:36

## 2024-06-06 RX ADMIN — Medication 5 DROP: at 15:31

## 2024-06-06 RX ADMIN — ANTI-FUNGAL POWDER MICONAZOLE NITRATE TALC FREE: 1.42 POWDER TOPICAL at 21:06

## 2024-06-06 RX ADMIN — FENTANYL CITRATE 25 MCG: 0.05 INJECTION, SOLUTION INTRAMUSCULAR; INTRAVENOUS at 00:10

## 2024-06-06 RX ADMIN — HALOPERIDOL LACTATE 2 MG: 5 INJECTION, SOLUTION INTRAMUSCULAR at 00:20

## 2024-06-06 RX ADMIN — DEXMEDETOMIDINE HYDROCHLORIDE 1.1 MCG/KG/HR: 400 INJECTION INTRAVENOUS at 11:20

## 2024-06-06 RX ADMIN — INSULIN GLARGINE 20 UNITS: 100 INJECTION, SOLUTION SUBCUTANEOUS at 11:20

## 2024-06-06 RX ADMIN — DEXMEDETOMIDINE HYDROCHLORIDE 0.5 MCG/KG/HR: 400 INJECTION INTRAVENOUS at 21:09

## 2024-06-06 RX ADMIN — SODIUM CHLORIDE: 4.5 INJECTION, SOLUTION INTRAVENOUS at 05:31

## 2024-06-06 RX ADMIN — VANCOMYCIN HYDROCHLORIDE 2500 MG: 1 INJECTION, POWDER, LYOPHILIZED, FOR SOLUTION INTRAVENOUS at 16:45

## 2024-06-06 RX ADMIN — ENOXAPARIN SODIUM 30 MG: 100 INJECTION SUBCUTANEOUS at 21:06

## 2024-06-06 RX ADMIN — CEFTRIAXONE SODIUM 2000 MG: 2 INJECTION, POWDER, FOR SOLUTION INTRAMUSCULAR; INTRAVENOUS at 15:30

## 2024-06-06 RX ADMIN — SODIUM CHLORIDE: 4.5 INJECTION, SOLUTION INTRAVENOUS at 21:09

## 2024-06-06 RX ADMIN — CEFTRIAXONE SODIUM 2000 MG: 2 INJECTION, POWDER, FOR SOLUTION INTRAMUSCULAR; INTRAVENOUS at 04:36

## 2024-06-06 RX ADMIN — LABETALOL HYDROCHLORIDE 10 MG: 5 INJECTION, SOLUTION INTRAVENOUS at 01:21

## 2024-06-06 RX ADMIN — LEVETIRACETAM 500 MG: 100 INJECTION, SOLUTION INTRAVENOUS at 04:38

## 2024-06-06 RX ADMIN — LATANOPROST 1 DROP: 50 SOLUTION OPHTHALMIC at 21:06

## 2024-06-06 RX ADMIN — HYDRALAZINE HYDROCHLORIDE 10 MG: 20 INJECTION, SOLUTION INTRAMUSCULAR; INTRAVENOUS at 23:45

## 2024-06-06 RX ADMIN — POTASSIUM CHLORIDE 10 MEQ: 7.46 INJECTION, SOLUTION INTRAVENOUS at 21:17

## 2024-06-06 RX ADMIN — LABETALOL HYDROCHLORIDE 10 MG: 5 INJECTION, SOLUTION INTRAVENOUS at 21:07

## 2024-06-06 RX ADMIN — DEXMEDETOMIDINE HYDROCHLORIDE 0.7 MCG/KG/HR: 400 INJECTION INTRAVENOUS at 15:21

## 2024-06-06 ASSESSMENT — PAIN SCALES - WONG BAKER: WONGBAKER_NUMERICALRESPONSE: NO HURT

## 2024-06-06 ASSESSMENT — PAIN SCALES - GENERAL: PAINLEVEL_OUTOF10: 0

## 2024-06-06 NOTE — PROGRESS NOTES
DANIA Gaston NP at bedside to evaluate patient. Patient continues to be agitated and restless. Patient minimally following commands and unable to answer questions at this time. See new orders.

## 2024-06-06 NOTE — PROGRESS NOTES
DANIA Gaston NP notified patient is confused and attempting to get out of bed, as well as pulling at her lines, pulse ox, and blood pressure cuff. Patient is unable to be reoriented at this time and continues to state she needs to get out of bed.     Orders placed for bilateral soft wrist restraints.

## 2024-06-06 NOTE — PROGRESS NOTES
Called to bedside by primary RN; pt with increased agitation overnight. Resident physician at bedside. Requested CT head WO contrast to be changed to stat. Resident to modify order. Pt to CT with primary RN.

## 2024-06-06 NOTE — PROGRESS NOTES
Per LEELA Sparks Dr. to see pt today prior to EMANUEL being set up.    0900 no EMANUEL today per Dr. Zelaya   no

## 2024-06-06 NOTE — PROGRESS NOTES
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  Found to be septic with confirmatory testing for H influenza bacteremia and meningitis CSF fluid positive.  Scan of abdomen also showed colitis.  Patient started on IV ceftriaxone and oral vancomycin.  CT scan 6/6 showing mastoiditis and sinusitis.  Cardiac concern about endocarditis without EMANUEL scheduled for tomorrow 6/7 due to patient needing intubation today due to agitation and restlessness.  Currently on Precedex drip.     Acute mental status change  -New findings of agitation and restlessness-this morning 6/6  -Received Precedex drip and now on max dose  -Plan to undergo EMANUEL and mostly to be intubated  -Neurology on board awaiting input  -Continue ICU care level  -CT scan done this morning no acute intracranial process identified fluid opacification of middle ears and mastoid air cells mostly otomastoiditis  -Acute frontal sinusitis will discuss antibiotics with attending and change accordingly.   -ID on board and following the recommendations    Mastoiditis  -CT scan  scale  -Hypoglycemia protocol     DVT prophylaxis: Lovenox  GI prophylaxis: Protonix  Diet: Adult   Disposition: TBD     OT/PT/SW Consulted     Code Status: Full    Plan will be discussed with Dr. Nancy Perez MD  6/6/2024  10:29 AM      Attending Physician Statement  I have discussed the care of Kavya De Santiago and I have examined the patient myself and taken ROS and HPI, including pertinent history and exam findings, with the resident. I have reviewed the key elements of all parts of the encounter with the resident.  I agree with the assessment, plan and orders as documented by the resident.cc 35 mins  H. influenzae meningitis likely secondary to mastoiditis and otitis was the primary source sensitive to Rocephin on 2 g IV also on vancomycin ID to review the need for Vanco  Overnight agitation required Precedex drip and extubated  CT scan pictures reviewed  Case discussed in person with the neurologist      Electronically signed by Khoi Cruz MD

## 2024-06-06 NOTE — PLAN OF CARE
Problem: Safety - Medical Restraint  Goal: Remains free of injury from restraints (Restraint for Interference with Medical Device)  Description: INTERVENTIONS:  1. Determine that other, less restrictive measures have been tried or would not be effective before applying the restraint  2. Evaluate the patient's condition at the time of restraint application  3. Inform patient/family regarding the reason for restraint  4. Q2H: Monitor safety, psychosocial status, comfort, nutrition and hydration  6/6/2024 1545 by Kristi Martel RN  Outcome: Progressing  Flowsheets (Taken 6/6/2024 1545)  Remains free of injury from restraints (restraint for interference with medical device):   Determine that other, less restrictive measures have been tried or would not be effective before applying the restraint   Evaluate the patient's condition at the time of restraint application   Inform patient/family regarding the reason for restraint   Every 2 hours: Monitor safety, psychosocial status, comfort, nutrition and hydration     Problem: Safety - Adult  Goal: Free from fall injury  6/6/2024 1545 by Kristi Martel RN  Outcome: Progressing  Flowsheets (Taken 6/6/2024 1545)  Free From Fall Injury:   Instruct family/caregiver on patient safety   Based on caregiver fall risk screen, instruct family/caregiver to ask for assistance with transferring infant if caregiver noted to have fall risk factors     Problem: Discharge Planning  Goal: Discharge to home or other facility with appropriate resources  6/6/2024 1545 by Kristi Martel RN  Outcome: Progressing  Flowsheets (Taken 6/5/2024 2000 by Alta Navarrete, RN)  Discharge to home or other facility with appropriate resources:   Arrange for needed discharge resources and transportation as appropriate   Identify barriers to discharge with patient and caregiver   Identify discharge learning needs (meds, wound care, etc)   Arrange for interpreters to assist at discharge as  prescribed range:   Monitor blood glucose as ordered   Assess for signs and symptoms of hyperglycemia and hypoglycemia   Administer ordered medications to maintain glucose within target range   Assess barriers to adequate nutritional intake and initiate nutrition consult as needed   Instruct patient on self management of diabetes and initiate consult as needed

## 2024-06-06 NOTE — PROGRESS NOTES
hospitalization).  Continue home lipitor 40 mg. LDL 47, A1c 10.9.  Goal blood pressure normotensive.  Dc Keppra as may have contributed to agitation. This was started empirically. No seizures or seizure like activity. EEG normal.  Repeat CT brain with mastoiditis and sinusitis.  She had bilateral mastoid effusions on MRI as well.  Suspect this was likely source of meningitis given preceding ear pain/symptoms, also had decreased hearing.  Appears no ENT coverage at our facility.  She is already on antibiotics.  Discussed with ID.  Continue antibiotics as per ID recs.  Precedex currently being weaned. Agree to wean as tolerated. Started on seroquel 25 mg qhs. Would use PRN antipsychotics if needed. Avoid benzos.  PT/OT/ST. Will need placement, possible LTACH vs ARU.  Treatment of underlying medical conditions as per primary team  Non-pharmacological methods to improve delirium include blinds up, TV on during the day, allow to sleep at night, frequent reorientation, etc.   Reorientation strategies (family pictures, well lit room, family reassurance)   updated at bedside.    We will continue to follow. Discussed with ID and primary teams.    Electronically signed by Jolynn Ferguson DO on 6/6/2024 at 1:52 PM      Jolynn Ferguson DO  [facility] [place]  Neurology

## 2024-06-06 NOTE — PROGRESS NOTES
Dr. Burris notified patient is agitated and pulling at her restraints. Orders to start precedex gtt.

## 2024-06-06 NOTE — PROGRESS NOTES
Comprehensive Nutrition Assessment    Type and Reason for Visit:  Reassess    Nutrition Recommendations/Plan:   Will add Ensure High Protein to all Regular trays     Malnutrition Assessment:  Malnutrition Status:  Insufficient data (05/28/24 1501)    Context:  Acute Illness     Findings of the 6 clinical characteristics of malnutrition:  Energy Intake:  Mild decrease in energy intake (Comment)  Weight Loss:  Unable to assess     Body Fat Loss:  Unable to assess     Muscle Mass Loss:  Unable to assess    Fluid Accumulation:  Moderate to Severe Extremities   Strength:  Not Performed    Nutrition Assessment:    Pt passed swallow eval (6/4) for Regular diet but slept though lunch today.    Nutrition Related Findings:    mild edema all extremities/generalized, Labs: Glu 157, Meds: Reviewed, BM 6/5 Wound Type: None       Current Nutrition Intake & Therapies:    Average Meal Intake: 0%     ADULT DIET; Regular    Anthropometric Measures:  Height: 157.5 cm (5' 2\")  Ideal Body Weight (IBW): 110 lbs (50 kg)    Admission Body Weight: 104.8 kg (231 lb)  Current Body Weight: 98.9 kg (218 lb), 181.8 % IBW. Weight Source: Bed Scale  Current BMI (kg/m2): 39.9                          BMI Categories: Obese Class 2 (BMI 35.0 -39.9)    Estimated Daily Nutrient Needs:  Energy Requirements Based On: Formula  Weight Used for Energy Requirements: Admission  Energy (kcal/day): 8673-4033 kcals based on [employer] Jeor with 1.3-1.4 factor  Weight Used for Protein Requirements: Ideal  Protein (g/day):  gm protein based on 1.8-2 gm/kg  Method Used for Fluid Requirements: Other (Comment) (per MD)    Nutrition Diagnosis:   Inadequate oral intake related to impaired respiratory function as evidenced by NPO or clear liquid status due to medical condition    Nutrition Interventions:   Food and/or Nutrient Delivery: Start Oral Nutrition Supplement (monitor pt's ability to take po)  Nutrition Education/Counseling: No recommendation at this

## 2024-06-06 NOTE — TELEPHONE ENCOUNTER
Lindy from [employer] called office to clarify if paperwork was received.     As of date no Release of Information on file for [employer], informed Lindy that I was unable to speak to her regarding patient. Lindy states [employer] has has authorization.    Informed Lindy that [facility] requires a release on [facility] letterhead and as of date there is no Release on file within patients chart. Informed Lindy that if patient wishes to have office speak to [employer] patient would have to come into office and fill out a release of information provided by [facility].     Of note patient has not been seen by a provider in our office since 03/2022.

## 2024-06-06 NOTE — PROGRESS NOTES
Dr Harris aware of CT head results and over night temp. She read the perfect serve, waiting for orders.

## 2024-06-06 NOTE — PROGRESS NOTES
Dr. Burris updated on patient condition and that patient is maxed on Precedex as well as received all of her prn medication. Also updated on mental status, ABGs, and ammonia results. No new orders at this time.

## 2024-06-06 NOTE — PROGRESS NOTES
[facility]    Date: 2024  Patient Name: Kavya De Santiago        : 1960       [] Pt Refusal           [x] Pt Unavailable due to:     Per ST note this pm: Deferred by RN -- Pt started on Precedex as was agitated and restless.       Pao Gonzalez, OT,   Date: 2024

## 2024-06-06 NOTE — CARE COORDINATION
Writer contacted Indigo from HELP department to assist  with medicaid application as insurance had lapsed as of January 1 and current insurance will not provide SNF benefits. Harvey from [facility] updated.

## 2024-06-06 NOTE — PROGRESS NOTES
Dr Harris updated on patient status. Patient did have fever last night 101.2. CXR lactic and ua with reflux to culture ordered.

## 2024-06-06 NOTE — PROGRESS NOTES
Vancomycin Dosing by Pharmacy - Initial Note   TODAY'S DATE:  6/6/2024  Patient name, age:  Kavya De Santiago, 64 y.o.    Indication: Sepsis of unknown origin, empiric  Additional antimicrobials:  rocephin    Allergies:  Codeine   Actual Weight:    Wt Readings from Last 1 Encounters:   06/04/24 99 kg (218 lb 4.1 oz)     Labs/Ancillary Data  Estimated Creatinine Clearance: 79 mL/min (based on SCr of 0.8 mg/dL).  Recent Labs     06/04/24  0346 06/05/24  0406 06/05/24  0525 06/06/24  0349   CREATININE 0.6 0.6  --  0.8   BUN 10 9  --  12   WBC 10.9  --  14.0* 13.8*     Procalcitonin   Date Value Ref Range Status   05/27/2024 11.40 (H) <0.09 ng/mL Final     Comment:           Suspected Sepsis:  <0.50 ng/mL     Low likelihood of sepsis.  0.50-2.00 ng/mL     Increased likelihood of sepsis. Antibiotics encouraged.  >2.00 ng/mL     High risk of sepsis/shock. Antibiotics strongly encouraged.        Suspected Lower Resp Tract Infections:  <0.24 ng/mL     Low likelihood of bacterial infection.  >0.24 ng/mL     Increased likelihood of bacterial infection. Antibiotics encouraged.        With successful antibiotic therapy, PCT levels should decrease rapidly. (Half-life of 24 to   36 hours.)        Procalcitonin values from samples collected within the first 6 hours of systemic infection   may still be low. Retesting may be indicated.  Values from day 1 and day 4 can be entered into the Change in Procalcitonin Calculator   ([email or web address]-pct-[email or web address]) to determine the patient's Mortality Risk Prognosis        In healthy neonates, plasma Procalcitonin (PCT) concentrations increase gradually after   birth, reaching peak values at about 24 hours of age then decrease to normal values below   0.5 ng/mL by 48-72 hours of age.         Intake/Output Summary (Last 24 hours) at 6/6/2024 1450  Last data filed at 6/6/2024 0711  Gross per 24 hour   Intake 1099.75 ml   Output 550 ml   Net 549.75 ml     Temp: 101.2    Recent vancomycin  administrations        No vancomycin IV orders with administrations found.            Orders not given:            vancomycin (VANCOCIN) 2,500 mg in sodium chloride 0.9 % 500 mL IVPB    vancomycin (VANCOCIN) intermittent dosing (placeholder)    vancomycin (VANCOCIN) 1,500 mg in sodium chloride 0.9 % 250 mL IVPB ([id number])                  Culture Date / Source  /  Results  See micro     MRSA Nasal Swab: N/A. Non-respiratory infection..    PLAN   Initial loading dose of 25mg/kg (max of 2,500 mg) = 2500  mg  x 1, then  vancomycin 1500 mg IV every 18 hours.    Ensured BUN/sCr ordered at baseline and every 48 hours x at least 3 levels, then at least weekly.  Vancomycin level ordered for 06/07 @ 1600. Will use bayesian method for dosing.  This level will not be a trough.  Target AUC/RADHA: 400-600.      Vancomycin Target Concentration Parameters  Treatment  Population Target AUC/RADHA Target Trough   Invasive MRSA Infection (bacteremia, pneumonia, meningitis, endocarditis, osteomyelitis)  Sepsis (undifferentiated) 400-600 N/A   Infection due to non-MRSA pathogen  Empiric treatment of non-invasive MRSA infection  (SSTI, UTI) <500 10-15 mg/L   CrCl < 29 mL/min  Rapidly fluctuating serum creatinine   HAYLEY N/A < 15 mg/L     Renal replacement therapy is dosed by levels, per hospital protocol.  Abbreviations  * Pauc: probability that AUC is >400 (efficacy); Pconc: probability that Ctrough is above 20 ?g/mL (toxicity); Tox: Probability of nephrotoxicity, based on Rangel et al. Clin Infect Dis 2009.    Loading dose: 2500 mg at 00:00 06/06/2024.  Regimen: 1500 mg IV every 18 hours.  Start time: 18:00 on 06/06/2024  Exposure target: AUC24 (range)400-600 mg/L.hr   INP04-31: 443 mg/L.hr  AUC24,ss: 516 mg/L.hr  Probability of AUC24 > 400: 86 %  Ctrough,ss: 13.6 mg/L  Probability of Ctrough,ss > 20: 12 %      Thank you for the consult.  Pharmacy will continue to follow.    Kathy Todd, PharmD, BCPS  6/6/2024 2:50 PM

## 2024-06-06 NOTE — PROGRESS NOTES
Dr. Ferguson updated on patient's restlessness, confusion, agitation, and current medications. Notified patient has had no improvement in condition despite consistently going up per order on the patient's Precedex gtt. No new orders at this time. Dr. Ferguson states he will reassess this patient in the morning.

## 2024-06-06 NOTE — PLAN OF CARE
Problem: Discharge Planning  Goal: Discharge to home or other facility with appropriate resources  6/6/2024 0315 by Tamara Shell RN  Outcome: Not Progressing  Flowsheets (Taken 6/5/2024 2000 by Alta Navarrete RN)  Discharge to home or other facility with appropriate resources:   Arrange for needed discharge resources and transportation as appropriate   Identify barriers to discharge with patient and caregiver   Identify discharge learning needs (meds, wound care, etc)   Arrange for interpreters to assist at discharge as needed   Refer to discharge planning if patient needs post-hospital services based on physician order or complex needs related to functional status, cognitive ability or social support system  6/5/2024 1501 by Melinda Velazco RN  Outcome: Progressing     Problem: Pain  Goal: Verbalizes/displays adequate comfort level or baseline comfort level  6/6/2024 0315 by Tamara Shell RN  Outcome: Not Progressing  Flowsheets (Taken 6/5/2024 2000 by Alta Navarrete RN)  Verbalizes/displays adequate comfort level or baseline comfort level:   Encourage patient to monitor pain and request assistance   Assess pain using appropriate pain scale   Administer analgesics based on type and severity of pain and evaluate response   Implement non-pharmacological measures as appropriate and evaluate response   Consider cultural and social influences on pain and pain management   Notify Licensed Independent Practitioner if interventions unsuccessful or patient reports new pain  6/5/2024 1501 by Melinda Velazco RN  Outcome: Progressing     Problem: Neurosensory - Adult  Goal: Achieves stable or improved neurological status  6/6/2024 0315 by Tamara Shell RN  Outcome: Not Progressing  Flowsheets (Taken 6/5/2024 2000 by Alta Navarrete RN)  Achieves stable or improved neurological status: Assess for and report changes in neurological status  6/5/2024 1501 by Melinda Velazco RN  Outcome: Progressing  Goal:  Please excuse the above named patient from 12/15/2021 to 12/18/2021

## 2024-06-06 NOTE — PROGRESS NOTES
[facility] -   Infectious diseases evaluation  admission date 5/26/2024    reason for consultation:   High fever, unknown etiology sepsis    Impression :   Current:  Haemophilus influenza meningitis/ bacteremia  New fever/leukocytosis/mental status changes  Possible mastoiditis and sinusitis on CT head  Acute /subacute infarct in the left posterior frontal lobe   Sepsis   Colitis  Diabetes mellitus  Chronic kidney disease  Hyperlipidemia  Hypertension  GERD  Pagan      Discussion:  Status post lumbar puncture 5/29, cloudy fluid, 280 WBC, meningitis panel was positive for haemophilus influenza.      HENCE:   Continue Ceftriaxone 2 gm IV every 12 hours   Add IV vancomycin  Repeat blood cultures  UA with reflex to culture  Chest x-ray  Lactic acid  Procalcitonin level  Respiratory panel with COVID-19  No obvious vegetations per 2 d echo.   Discussed with cardiology and neurology  Follow CBC and renal function closely.  Discontinue p.o. vancomycin  No growth on blood cultures from 5/28/2024.  Consider ENT evaluation  Discussed with nursing staff  Supportive care.        Infection Control Recommendations   Harbert Precautions      Antimicrobial Stewardship Recommendations   Simplification of therapy  Targeted therapy      History of Present Illness:   Initial history:  Kavya De Santiago is a 64 y.o.-year-old female was brought to the hospital for altered mental status associated with nausea, vomiting and diarrhea.  The patient is intubated, unable to provide history that was obtained from chart review and  at the bedside had upper respiratory symptoms, congestion, headache and rhinorrhea for 1 week associated with dry cough and decreased appetite.  The patient was found on the ground on the day of admission, EMS called.  The patient was intubated at the ER.  The patient was hypothermic initially then has been running high-grade fever with temperature max of 105,

## 2024-06-06 NOTE — PROGRESS NOTES
expressive aphasia  Skin - no bruising or bleeding  Extremities - no cyanosis, clubbing or edema    Lab Results   CBC     Lab Results   Component Value Date/Time    WBC 13.8 06/06/2024 03:49 AM    RBC 3.59 06/06/2024 03:49 AM    HGB 9.6 06/06/2024 03:49 AM    HCT 30.2 06/06/2024 03:49 AM     06/06/2024 03:49 AM    MCV 84.2 06/06/2024 03:49 AM    MCH 26.9 06/06/2024 03:49 AM    MCHC 31.9 06/06/2024 03:49 AM    RDW 14.0 06/06/2024 03:49 AM    LYMPHOPCT 19 06/06/2024 03:49 AM    MONOPCT 12 06/06/2024 03:49 AM    EOSPCT 3 06/06/2024 03:49 AM    BASOPCT 1 06/06/2024 03:49 AM    MONOSABS 1.70 06/06/2024 03:49 AM    LYMPHSABS 2.22 04/11/2023 10:48 AM    EOSABS 0.50 06/06/2024 03:49 AM    BASOSABS 0.10 06/06/2024 03:49 AM    DIFFTYPE NOT REPORTED 07/21/2021 10:15 AM       BMP   Lab Results   Component Value Date/Time     06/06/2024 03:49 AM    K 3.2 06/06/2024 03:49 AM     06/06/2024 03:49 AM    CO2 26 06/06/2024 03:49 AM    BUN 12 06/06/2024 03:49 AM    CREATININE 0.8 06/06/2024 03:49 AM    GLUCOSE 157 06/06/2024 03:49 AM    GLUCOSE 137 05/19/2012 12:05 PM    MG 1.9 06/04/2024 03:46 AM    PHOS 4.1 04/11/2023 10:48 AM       LFTS  Lab Results   Component Value Date/Time    ALKPHOS 184 06/03/2024 04:43 AM    ALT 54 06/03/2024 04:43 AM    AST 34 06/03/2024 04:43 AM    PROT 6.7 05/19/2012 12:05 PM    BILITOT 0.2 06/03/2024 04:43 AM    BILIDIR 0.1 06/03/2024 04:43 AM    IBILI 0.1 06/03/2024 04:43 AM       ABG ABGs:   Lab Results   Component Value Date/Time    PHART 7.572 06/05/2024 10:35 PM    PO2ART 64.3 06/05/2024 10:35 PM    RHM3FRG 31.6 06/05/2024 10:35 PM       Lab Results   Component Value Date/Time    MODE PRVC 06/02/2024 04:54 AM         INR  No results for input(s): \"PROTIME\", \"INR\" in the last 72 hours.    APTT  No results for input(s): \"APTT\" in the last 72 hours.    Lactic Acid  Lab Results   Component Value Date/Time    LACTA 1.1 05/27/2024 03:37 PM    LACTA 1.7 05/26/2024 09:09 PM    LACTA 3.4  05/26/2024 06:36 PM        BNP   No results for input(s): \"BNP\" in the last 72 hours.     Cultures       Radiology     CT of brain is pending      SYSTEMS ASSESSMENT    Acute hypoxic respiratory failure, intubated 5/26 mostly for fever and altered mental status; extubated 6/2  Sepsis-initially in shock with H influenza bacteremia and meningitis-CSF fluid positive by molecular panel for H influenza, ill for approximately 1 week but refused to come to the hospital per family  Acute to subacute infarcts in the left posterior frontal lobe  Lactic acidosis-resolved  Type 2 diabetes  Questionable colitis seen on CT scan abdomen/pelvis rectal tube in place  Echo 5/28 shows normal ejection fraction but RVSP of 52  Type 2 diabetes  Chronic back pain had been on opiates and gabapentin in the past-follows with neurology, neurosurgery and pain management Dr. Granados  History of hypertension  KVNG-clinical diagnosis no sleep study has been done, never followed up with us in the office, sleep study ordered in 2015, 2022 and again in 2023 but I do not see that it was ever completed, does snore per her   Restless leg syndrome-improved with increased ropinirole  Chronic pain syndrome/depression on Lyrica, Requip, Effexor and BuSpar  Nonmorbid obesity  COVID/influenza swab negative  Full CODE STATUS    Significant change in mental status overnight   ABG shows respiratory alkalosis and mild hypoxemia  Too unstable now to undergo EMANUEL-would have to be intubated because I am not sure she could protect her airway if she has a EMANUEL  Await neurology input  Continue Precedex drip  If she tires out, will need to be reintubated  Replace electrolytes  Continue ICU level care   not here but is aware of events  Discussed with nursing staff    Critical Care Time   35 min    Electronically signed by Rosendo Huffman MD on 6/6/2024 at 8:44 AM

## 2024-06-06 NOTE — PROGRESS NOTES
SPEECH LANGUAGE PATHOLOGY  [facility]  Speech Language Pathology  [facility] ICU    Date: 6/6/2024  Patient Name: Kavya De Santiago  YOB: 1960   AGE: 64 y.o.  MRN: [id number]        Patient Not Available for Speech Therapy     Due to:  [] Testing  [] Hemodialysis  [x] Deferred by RN - 1529 Attempted to see Pt for ST.  Deferred by RN -- Pt started on Precedex as was agitated and restless.  ST to continue to follow.   [] Surgery   [] Intubation/Sedation/Pain Medication  [] Medical instability  [] Other:      Completed by: Blanca Snowden M.A., CCC-SLP

## 2024-06-07 ENCOUNTER — ANESTHESIA EVENT (OUTPATIENT)
Dept: ICU | Age: 64
End: 2024-06-07
Payer: COMMERCIAL

## 2024-06-07 ENCOUNTER — APPOINTMENT (OUTPATIENT)
Age: 64
End: 2024-06-07
Payer: COMMERCIAL

## 2024-06-07 ENCOUNTER — ANESTHESIA (OUTPATIENT)
Dept: ICU | Age: 64
End: 2024-06-07
Payer: COMMERCIAL

## 2024-06-07 LAB
ANION GAP SERPL CALCULATED.3IONS-SCNC: 15 MMOL/L (ref 9–17)
B PARAP IS1001 DNA NPH QL NAA+NON-PROBE: NOT DETECTED
B PERT DNA SPEC QL NAA+PROBE: NOT DETECTED
BASOPHILS # BLD: 0.1 K/UL (ref 0–0.2)
BASOPHILS NFR BLD: 1 % (ref 0–2)
BUN SERPL-MCNC: 12 MG/DL (ref 8–23)
C PNEUM DNA NPH QL NAA+NON-PROBE: NOT DETECTED
CALCIUM SERPL-MCNC: 8 MG/DL (ref 8.6–10.4)
CHLORIDE SERPL-SCNC: 110 MMOL/L (ref 98–107)
CO2 SERPL-SCNC: 20 MMOL/L (ref 20–31)
CREAT SERPL-MCNC: 0.7 MG/DL (ref 0.5–0.9)
DATE LAST DOSE: NORMAL
EOSINOPHIL # BLD: 0.6 K/UL (ref 0–0.4)
EOSINOPHILS RELATIVE PERCENT: 6 % (ref 0–4)
ERYTHROCYTE [DISTWIDTH] IN BLOOD BY AUTOMATED COUNT: 14.5 % (ref 11.5–14.9)
FLUAV RNA NPH QL NAA+NON-PROBE: NOT DETECTED
FLUBV RNA NPH QL NAA+NON-PROBE: NOT DETECTED
GFR, ESTIMATED: >90 ML/MIN/1.73M2
GLUCOSE BLD-MCNC: 100 MG/DL (ref 65–105)
GLUCOSE BLD-MCNC: 145 MG/DL (ref 65–105)
GLUCOSE BLD-MCNC: 155 MG/DL (ref 65–105)
GLUCOSE SERPL-MCNC: 106 MG/DL (ref 70–99)
HADV DNA NPH QL NAA+NON-PROBE: NOT DETECTED
HCOV 229E RNA NPH QL NAA+NON-PROBE: NOT DETECTED
HCOV HKU1 RNA NPH QL NAA+NON-PROBE: NOT DETECTED
HCOV NL63 RNA NPH QL NAA+NON-PROBE: NOT DETECTED
HCOV OC43 RNA NPH QL NAA+NON-PROBE: NOT DETECTED
HCT VFR BLD AUTO: 28.7 % (ref 36–46)
HGB BLD-MCNC: 8.9 G/DL (ref 12–16)
HMPV RNA NPH QL NAA+NON-PROBE: NOT DETECTED
HPIV1 RNA NPH QL NAA+NON-PROBE: NOT DETECTED
HPIV2 RNA NPH QL NAA+NON-PROBE: NOT DETECTED
HPIV3 RNA NPH QL NAA+NON-PROBE: NOT DETECTED
HPIV4 RNA NPH QL NAA+NON-PROBE: NOT DETECTED
LYMPHOCYTES NFR BLD: 2.2 K/UL (ref 1–4.8)
LYMPHOCYTES RELATIVE PERCENT: 23 % (ref 24–44)
M PNEUMO DNA NPH QL NAA+NON-PROBE: NOT DETECTED
MCH RBC QN AUTO: 27.3 PG (ref 26–34)
MCHC RBC AUTO-ENTMCNC: 30.9 G/DL (ref 31–37)
MCV RBC AUTO: 88.6 FL (ref 80–100)
MONOCYTES NFR BLD: 0.8 K/UL (ref 0.1–1.3)
MONOCYTES NFR BLD: 9 % (ref 1–7)
NEUTROPHILS NFR BLD: 61 % (ref 36–66)
NEUTS SEG NFR BLD: 5.7 K/UL (ref 1.3–9.1)
PLATELET # BLD AUTO: 344 K/UL (ref 150–450)
PMV BLD AUTO: 7.1 FL (ref 6–12)
POTASSIUM SERPL-SCNC: 3.5 MMOL/L (ref 3.7–5.3)
RBC # BLD AUTO: 3.24 M/UL (ref 4–5.2)
RSV RNA NPH QL NAA+NON-PROBE: NOT DETECTED
RV+EV RNA NPH QL NAA+NON-PROBE: NOT DETECTED
SARS-COV-2 RNA NPH QL NAA+NON-PROBE: NOT DETECTED
SODIUM SERPL-SCNC: 145 MMOL/L (ref 135–144)
SPECIMEN DESCRIPTION: NORMAL
TME LAST DOSE: 1526 H
VANCOMYCIN DOSE: 1500 MG
VANCOMYCIN SERPL-MCNC: 25.8 UG/ML
WBC OTHER # BLD: 9.4 K/UL (ref 3.5–11)

## 2024-06-07 PROCEDURE — A4216 STERILE WATER/SALINE, 10 ML: HCPCS | Performed by: INTERNAL MEDICINE

## 2024-06-07 PROCEDURE — 99233 SBSQ HOSP IP/OBS HIGH 50: CPT | Performed by: INTERNAL MEDICINE

## 2024-06-07 PROCEDURE — 2000000000 HC ICU R&B

## 2024-06-07 PROCEDURE — 6360000002 HC RX W HCPCS: Performed by: NURSE ANESTHETIST, CERTIFIED REGISTERED

## 2024-06-07 PROCEDURE — 3700000001 HC ADD 15 MINUTES (ANESTHESIA): Performed by: ANESTHESIOLOGY

## 2024-06-07 PROCEDURE — 2580000003 HC RX 258: Performed by: ANESTHESIOLOGY

## 2024-06-07 PROCEDURE — 36415 COLL VENOUS BLD VENIPUNCTURE: CPT

## 2024-06-07 PROCEDURE — 2580000003 HC RX 258: Performed by: INTERNAL MEDICINE

## 2024-06-07 PROCEDURE — 6370000000 HC RX 637 (ALT 250 FOR IP)

## 2024-06-07 PROCEDURE — 2500000003 HC RX 250 WO HCPCS: Performed by: INTERNAL MEDICINE

## 2024-06-07 PROCEDURE — 99232 SBSQ HOSP IP/OBS MODERATE 35: CPT | Performed by: PSYCHIATRY & NEUROLOGY

## 2024-06-07 PROCEDURE — 85025 COMPLETE CBC W/AUTO DIFF WBC: CPT

## 2024-06-07 PROCEDURE — 80202 ASSAY OF VANCOMYCIN: CPT

## 2024-06-07 PROCEDURE — 93312 ECHO TRANSESOPHAGEAL: CPT

## 2024-06-07 PROCEDURE — B24BZZ4 ULTRASONOGRAPHY OF HEART WITH AORTA, TRANSESOPHAGEAL: ICD-10-PCS | Performed by: INTERNAL MEDICINE

## 2024-06-07 PROCEDURE — 6370000000 HC RX 637 (ALT 250 FOR IP): Performed by: ANESTHESIOLOGY

## 2024-06-07 PROCEDURE — 80048 BASIC METABOLIC PNL TOTAL CA: CPT

## 2024-06-07 PROCEDURE — 51798 US URINE CAPACITY MEASURE: CPT

## 2024-06-07 PROCEDURE — 6360000002 HC RX W HCPCS: Performed by: INTERNAL MEDICINE

## 2024-06-07 PROCEDURE — 2500000003 HC RX 250 WO HCPCS: Performed by: NURSE ANESTHETIST, CERTIFIED REGISTERED

## 2024-06-07 PROCEDURE — 82947 ASSAY GLUCOSE BLOOD QUANT: CPT

## 2024-06-07 PROCEDURE — 51701 INSERT BLADDER CATHETER: CPT

## 2024-06-07 PROCEDURE — 6360000002 HC RX W HCPCS

## 2024-06-07 PROCEDURE — 6370000000 HC RX 637 (ALT 250 FOR IP): Performed by: INTERNAL MEDICINE

## 2024-06-07 PROCEDURE — 6370000000 HC RX 637 (ALT 250 FOR IP): Performed by: NURSE PRACTITIONER

## 2024-06-07 PROCEDURE — 3700000000 HC ANESTHESIA ATTENDED CARE: Performed by: ANESTHESIOLOGY

## 2024-06-07 PROCEDURE — C9113 INJ PANTOPRAZOLE SODIUM, VIA: HCPCS | Performed by: INTERNAL MEDICINE

## 2024-06-07 RX ORDER — SODIUM CHLORIDE 9 MG/ML
INJECTION, SOLUTION INTRAVENOUS CONTINUOUS PRN
Status: DISCONTINUED | OUTPATIENT
Start: 2024-06-07 | End: 2024-06-07 | Stop reason: SDUPTHER

## 2024-06-07 RX ORDER — HYDRALAZINE HYDROCHLORIDE 50 MG/1
50 TABLET, FILM COATED ORAL EVERY 8 HOURS SCHEDULED
Status: DISCONTINUED | OUTPATIENT
Start: 2024-06-07 | End: 2024-06-12

## 2024-06-07 RX ORDER — LIDOCAINE HYDROCHLORIDE 10 MG/ML
INJECTION, SOLUTION EPIDURAL; INFILTRATION; INTRACAUDAL; PERINEURAL PRN
Status: DISCONTINUED | OUTPATIENT
Start: 2024-06-07 | End: 2024-06-07 | Stop reason: SDUPTHER

## 2024-06-07 RX ORDER — PROPOFOL 10 MG/ML
INJECTION, EMULSION INTRAVENOUS PRN
Status: DISCONTINUED | OUTPATIENT
Start: 2024-06-07 | End: 2024-06-07 | Stop reason: SDUPTHER

## 2024-06-07 RX ORDER — QUETIAPINE FUMARATE 50 MG/1
50 TABLET, FILM COATED ORAL NIGHTLY
Status: DISCONTINUED | OUTPATIENT
Start: 2024-06-07 | End: 2024-06-14 | Stop reason: HOSPADM

## 2024-06-07 RX ORDER — LIDOCAINE HYDROCHLORIDE 10 MG/ML
INJECTION, SOLUTION EPIDURAL; INFILTRATION; INTRACAUDAL; PERINEURAL PRN
Status: DISCONTINUED | OUTPATIENT
Start: 2024-06-07 | End: 2024-06-07

## 2024-06-07 RX ADMIN — BUSPIRONE HYDROCHLORIDE 10 MG: 10 TABLET ORAL at 14:52

## 2024-06-07 RX ADMIN — LIDOCAINE HYDROCHLORIDE 50 MG: 10 INJECTION, SOLUTION EPIDURAL; INFILTRATION; INTRACAUDAL; PERINEURAL at 13:03

## 2024-06-07 RX ADMIN — ACETAMINOPHEN 650 MG: 325 TABLET ORAL at 14:51

## 2024-06-07 RX ADMIN — LATANOPROST 1 DROP: 50 SOLUTION OPHTHALMIC at 20:47

## 2024-06-07 RX ADMIN — SODIUM CHLORIDE: 9 INJECTION, SOLUTION INTRAVENOUS at 12:57

## 2024-06-07 RX ADMIN — BENZOCAINE, BUTAMBEN, AND TETRACAINE HYDROCHLORIDE 1 SPRAY: .028; .004; .004 AEROSOL, SPRAY TOPICAL at 15:13

## 2024-06-07 RX ADMIN — Medication 5 DROP: at 10:13

## 2024-06-07 RX ADMIN — DEXMEDETOMIDINE HYDROCHLORIDE 0.3 MCG/KG/HR: 400 INJECTION INTRAVENOUS at 04:32

## 2024-06-07 RX ADMIN — ATORVASTATIN CALCIUM 40 MG: 40 TABLET, FILM COATED ORAL at 20:44

## 2024-06-07 RX ADMIN — CEFTRIAXONE SODIUM 2000 MG: 2 INJECTION, POWDER, FOR SOLUTION INTRAMUSCULAR; INTRAVENOUS at 04:33

## 2024-06-07 RX ADMIN — Medication 5 DROP: at 20:47

## 2024-06-07 RX ADMIN — PANTOPRAZOLE SODIUM 40 MG: 40 INJECTION, POWDER, FOR SOLUTION INTRAVENOUS at 10:10

## 2024-06-07 RX ADMIN — VANCOMYCIN HYDROCHLORIDE 1500 MG: 1.5 INJECTION, POWDER, LYOPHILIZED, FOR SOLUTION INTRAVENOUS at 15:26

## 2024-06-07 RX ADMIN — ROPINIROLE HYDROCHLORIDE 2 MG: 2 TABLET, FILM COATED ORAL at 20:44

## 2024-06-07 RX ADMIN — HYDRALAZINE HYDROCHLORIDE 75 MG: 50 TABLET ORAL at 04:39

## 2024-06-07 RX ADMIN — ANTI-FUNGAL POWDER MICONAZOLE NITRATE TALC FREE: 1.42 POWDER TOPICAL at 20:46

## 2024-06-07 RX ADMIN — BUSPIRONE HYDROCHLORIDE 10 MG: 10 TABLET ORAL at 20:44

## 2024-06-07 RX ADMIN — HYDRALAZINE HYDROCHLORIDE 50 MG: 50 TABLET ORAL at 22:20

## 2024-06-07 RX ADMIN — POTASSIUM BICARBONATE 40 MEQ: 782 TABLET, EFFERVESCENT ORAL at 05:28

## 2024-06-07 RX ADMIN — QUETIAPINE FUMARATE 50 MG: 50 TABLET ORAL at 20:44

## 2024-06-07 RX ADMIN — CEFTRIAXONE SODIUM 2000 MG: 2 INJECTION, POWDER, FOR SOLUTION INTRAMUSCULAR; INTRAVENOUS at 17:01

## 2024-06-07 RX ADMIN — PROPOFOL 20 MG: 10 INJECTION, EMULSION INTRAVENOUS at 13:08

## 2024-06-07 RX ADMIN — PROPOFOL 30 MG: 10 INJECTION, EMULSION INTRAVENOUS at 13:04

## 2024-06-07 RX ADMIN — METOPROLOL 100 MG: 100 TABLET ORAL at 20:44

## 2024-06-07 RX ADMIN — ENOXAPARIN SODIUM 30 MG: 100 INJECTION SUBCUTANEOUS at 21:03

## 2024-06-07 RX ADMIN — ANTI-FUNGAL POWDER MICONAZOLE NITRATE TALC FREE: 1.42 POWDER TOPICAL at 17:02

## 2024-06-07 ASSESSMENT — PAIN SCALES - GENERAL
PAINLEVEL_OUTOF10: 0
PAINLEVEL_OUTOF10: 0
PAINLEVEL_OUTOF10: 3
PAINLEVEL_OUTOF10: 0

## 2024-06-07 ASSESSMENT — PAIN DESCRIPTION - DESCRIPTORS: DESCRIPTORS: ACHING

## 2024-06-07 ASSESSMENT — PAIN DESCRIPTION - LOCATION: LOCATION: HEAD

## 2024-06-07 NOTE — PLAN OF CARE
Progressing  Flowsheets (Taken 6/6/2024 2000)  Electrolytes maintained within normal limits:   Monitor labs and assess patient for signs and symptoms of electrolyte imbalances   Administer electrolyte replacement as ordered   Monitor response to electrolyte replacements, including repeat lab results as appropriate   Fluid restriction as ordered   Instruct patient on fluid and nutrition restrictions as appropriate     Problem: Metabolic/Fluid and Electrolytes - Adult  Goal: Glucose maintained within prescribed range  Outcome: Progressing  Flowsheets (Taken 6/6/2024 2000)  Glucose maintained within prescribed range:   Monitor blood glucose as ordered   Assess for signs and symptoms of hyperglycemia and hypoglycemia   Assess barriers to adequate nutritional intake and initiate nutrition consult as needed   Administer ordered medications to maintain glucose within target range   Instruct patient on self management of diabetes and initiate consult as needed     Problem: Anxiety  Goal: Will report anxiety at manageable levels  Description: INTERVENTIONS:  1. Administer medication as ordered  2. Teach and rehearse alternative coping skills  3. Provide emotional support with 1:1 interaction with staff  Outcome: Progressing  Flowsheets (Taken 6/6/2024 2000)  Will report anxiety at manageable levels: Administer medication as ordered     Problem: Coping  Goal: Pt/Family able to verbalize concerns and demonstrate effective coping strategies  Description: INTERVENTIONS:  1. Assist patient/family to identify coping skills, available support systems and cultural and spiritual values  2. Provide emotional support, including active listening and acknowledgement of concerns of patient and caregivers  3. Reduce environmental stimuli, as able  4. Instruct patient/family in relaxation techniques, as appropriate  5. Assess for spiritual pain/suffering and initiate Spiritual Care, Psychosocial Clinical Specialist consults as  effective before applying the restraint  2. Evaluate the patient's condition at the time of restraint application  3. Inform patient/family regarding the reason for restraint  4. Q2H: Monitor safety, psychosocial status, comfort, nutrition and hydration  Outcome: Completed  Flowsheets (Taken 6/6/2024 2000)  Remains free of injury from restraints (restraint for interference with medical device):   Determine that other, less restrictive measures have been tried or would not be effective before applying the restraint   Evaluate the patient's condition at the time of restraint application   Inform patient/family regarding the reason for restraint   Every 2 hours: Monitor safety, psychosocial status, comfort, nutrition and hydration

## 2024-06-07 NOTE — PROGRESS NOTES
1300  Dr Garcia Soto, Misael Damon with echo and this RN at bedside for EMANUEL. Patient placed on 4 L nasal cannula. Cetacaine spray was given, Lidocane and Diprivan per Dr Soto order and CRNA administration.     1310 /69 heart rate 95 resp rate 33 oxygen 98% on 4L NC patient sedated for scope to pass.    1313 /89 heart rate 117 resp rate 20 O2 95% 4L NC patient suctioned    1315 EMANUEL concluded. Patient tolerated well.

## 2024-06-07 NOTE — PROGRESS NOTES
Resident team paged patient is complaining of headache but has EMANUEL at 2pm, would need IV medication ordered.

## 2024-06-07 NOTE — PROGRESS NOTES
SPEECH LANGUAGE PATHOLOGY  Speech Language Pathology  ST ICU    Date: 6/7/2024  Patient Name: Kavya De Santiago  YOB: 1960   AGE: 64 y.o.  MRN: [id number]        Patient Not Available for Speech Therapy     Due to:  [] Testing  [] Hemodialysis  [] Cancelled by RN  [] Surgery   [] Intubation/Sedation/Pain Medication  [] Medical instability  [] Refusal  [x] Other: not appropriate for therapy this afternoon    Next scheduled treatment: as appropriate  Completed by: Oleg Nobles M.S., CCC-SLP

## 2024-06-07 NOTE — PLAN OF CARE
2259 by Alta Navarrete RN)  Incisions, Wounds, or Drain Sites Healing Without Sign and Symptoms of Infection:   ADMISSION and DAILY: Assess and document risk factors for pressure ulcer development   TWICE DAILY: Assess and document skin integrity     Problem: Skin/Tissue Integrity - Adult  Goal: Oral mucous membranes remain intact  6/7/2024 1509 by Kristi Martel RN  Outcome: Progressing  Flowsheets (Taken 6/7/2024 1509)  Oral Mucous Membranes Remain Intact:   Assess oral mucosa and hygiene practices   Implement preventative oral hygiene regimen   Implement oral medicated treatments as ordered     Problem: Gastrointestinal - Adult  Goal: Minimal or absence of nausea and vomiting  6/7/2024 1509 by Kristi Martel RN  Outcome: Progressing  Flowsheets (Taken 6/7/2024 1509)  Minimal or absence of nausea and vomiting:   Administer IV fluids as ordered to ensure adequate hydration   Provide nonpharmacologic comfort measures as appropriate   Nutrition consult to assist patient with adequate nutrition and appropriate food choices   Maintain NPO status until nausea and vomiting are resolved   Advance diet as tolerated, if ordered     Problem: Gastrointestinal - Adult  Goal: Maintains or returns to baseline bowel function  6/7/2024 1509 by Kristi Martel RN  Outcome: Progressing  Flowsheets (Taken 6/7/2024 1509)  Maintains or returns to baseline bowel function:   Assess bowel function   Administer IV fluids as ordered to ensure adequate hydration   Encourage oral fluids to ensure adequate hydration   Administer ordered medications as needed     Problem: Gastrointestinal - Adult  Goal: Maintains adequate nutritional intake  6/7/2024 1509 by Kristi Martel RN  Outcome: Progressing  Flowsheets (Taken 6/7/2024 1509)  Maintains adequate nutritional intake:   Assist with meals as needed   Monitor intake and output, weight and lab values   Obtain nutritional consult as needed     Problem: Infection - Adult  Goal:  appropriate   Monitor and intervene to maintain adequate nutrition, hydration, elimination, sleep and activity

## 2024-06-07 NOTE — PROGRESS NOTES
[facility] Neurology   IN-PATIENT SERVICE      NEUROLOGY PROGRESS  NOTE            Date:   6/7/2024  Patient name:  Kavya De Santiago  Date of admission:  5/26/2024  YOB: 1960      Interval History:     6/7: Mentation significantly improved today.  Was able to sleep last night.  Precedex has now been completely weaned off.  She is alert and oriented x 3.  No new neurological symptoms.    6/6: Overnight, patient became very agitated and restless, was trying to get out of bed.  She needed to be started on Precedex which was eventually maxed out.  At time of evaluation, she is very lethargic, difficult to awaken.   at bedside.  Repeat CT brain done today.    6/5: Has been having some waxing and waning mentation, continues to say that she is supposed to go to [facility] this weekend.  Intermittently restless/agitated.  No episodes of staring or seizure-like activity.    6/4: Had MRI brain done yesterday.  Continues to have improvement in mentation.  No new neurological deficits.  Working with PT/OT/ST.    6/3: No acute events.  Was extubated yesterday.  This morning, per nursing staff and  at bedside, mentation improved.  She is able to answer questions although slow to respond.  No seizures or seizure-like activity.    History of Present Illness:     Per my partner:    Briefly, this is a  64 y.o. female with hx of HTN, DM, DIEGO and CKD was admitted on 5/26/2024 with altered mentation.  Initial history obtained from EMS report and patient's .  EMS report states that they were called to the patient's house for c/o nausea and vomiting and upon evaluation by EMS noted to be with abnormal glucose concerning for DKA.  While transporting the patient to the hospital; she became completely unresponsive with O2 saturations dropping for which patient was placed on supraglottic airway.  She was hypertensive on arrival with blood pressures with systolic around 160 mm Hg.  Patient was also  chloride  500 mL IntraVENous Once         Physical Exam:   BP (!) 148/87   Pulse (!) 101   Temp 98.3 °F (36.8 °C) (Axillary)   Resp 26   Ht 1.575 m (5' 2\")   Wt 99 kg (218 lb 4.1 oz)   SpO2 96%   BMI 39.92 kg/m²   Temp (24hrs), Av.3 °F (36.8 °C), Min:97.8 °F (36.6 °C), Max:99.5 °F (37.5 °C)        Neurological examination:     Mental status    Awake, oriented to person, place ([facility]), knows year , month . Knows current president. Speech slow. Follows commands.      Cranial nerves    II - visual fields intact to confrontation; pupils reactive (4 mm OU)  III, IV, VI - extraocular muscles intact; no MALINI; no nystagmus; no ptosis   V - normal facial sensation                                                               VII - normal facial symmetry                                                             VIII - hearing decreased bilaterally                                                                         IX, X - symmetrical palate elevation                                               XI - symmetrical shoulder shrug                                                       XII - midline tongue without atrophy or fasciculation      Motor function  Strength: Bilateral UE 4/5, bilateral LE 3-4/5 with partial left foot drop (chronic)  Normal bulk and tone.                   Sensory function Intact to touch throughout      Cerebellar Not assessed      Reflex function 1/4 symmetric throughout . Equivocal plantar response bilaterally. (-)Boyer's sign bilaterally    Gait                  Deferred             Diagnostics:      Laboratory Testing:  CBC:   Recent Labs     24  0525 24  0349 24  0438   WBC 14.0* 13.8* 9.4   HGB 9.6* 9.6* 8.9*    399 344     BMP:    Recent Labs     24  0406 24  1452 24  0349 24  1528 24  2239 24  0438     --  148*  --   --  145*   K 3.7   < > 3.2* 3.3* 3.9 3.5*     --  108*  --   --  110*

## 2024-06-07 NOTE — PROGRESS NOTES
TECHNOLOGIST PROVIDED HISTORY: not following commands off sedation Additional Contrast?->1 Reason for Exam: not following commands off sedation FINDINGS: BRAIN/VENTRICLES: There is no acute intracranial hemorrhage, mass effect or midline shift.  No abnormal extra-axial fluid collection.  The gray-white differentiation is maintained without evidence of an acute infarct.  There is no evidence of hydrocephalus. No abnormal enhancement. ORBITS: The visualized portion of the orbits demonstrate no acute abnormality. SINUSES: Mucosal thickening and fluid throughout the paranasal sinuses. Partial opacification of the bilateral mastoid air cells. SOFT TISSUES/SKULL:  No acute abnormality of the visualized skull or soft tissues.     No acute intracranial abnormality or abnormal enhancement.     XR CHEST PORTABLE    Result Date: 6/1/2024  EXAMINATION: ONE XRAY VIEW OF THE CHEST 6/1/2024 6:38 am COMPARISON: 05/31/2024, 525 hours HISTORY: ORDERING SYSTEM PROVIDED HISTORY: vent TECHNOLOGIST PROVIDED HISTORY: vent Reason for Exam: vent 64-year-old female receiving ventilator support FINDINGS: AP portable upright view of the chest. Right-sided PICC distal tip overlying the cavoatrial junction. Endotracheal tube distal tip overlying the mid trachea approximately 2.5 cm above the level of the mason. NG tube traverses the GE junction with distal tip excluded from the field of view. Cardiac monitor leads overlie the chest. Stable cardiomegaly.  Mild pulmonary vascular congestion.  Low lung volume exam.  Trace bilateral effusions, stable.  Stable mild retrocardiac airspace opacity.  Visualized osseous structures remain unchanged.     1. Stable cardiomegaly.  Mild pulmonary vascular congestion.  Low lung volume study.  Trace bilateral effusions.  Stable mild retrocardiac airspace disease. 2. Right-sided PICC and tubes as detailed above.     IR FLUORO GUIDED CVA DEVICE PLMT/REPLACE/REMOVAL    Result Date: 5/31/2024  PROCEDURE: ULTRASOUND  evidence of intraluminal filling defect to suggest pulmonary embolism.  Main pulmonary artery is prominent and could indicate underlying pulmonary arterial hypertension. Mediastinum: No evidence of mediastinal lymphadenopathy.  The heart and pericardium demonstrate no acute abnormality.  There is no acute abnormality of the thoracic aorta.  Aberrant right subclavian artery.  Endotracheal tube is above the mason.  Enteric tube within the esophagus with distal aspect below the hemidiaphragm within the stomach with tip not imaged on this exam. Lungs/pleura: Posterior dependent atelectasis or infiltrates.  No pneumothorax or significant pleural effusion.  Central airways are patent. No suspicious lung nodule or mass. Upper Abdomen: Limited visualization upper abdomen demonstrates no acute findings.  Hepatic steatosis Soft Tissues/Bones: Spinal degenerative changes with no acute findings.     1. No evidence of pulmonary embolism. 2. Posterior dependent atelectasis or infiltrates. 3. Prominent main pulmonary artery which could indicate underlying pulmonary arterial hypertension. 4. Hepatic steatosis.     CT ABDOMEN PELVIS W IV CONTRAST Additional Contrast? None    Result Date: 5/26/2024  EXAMINATION: CT OF THE ABDOMEN AND PELVIS WITH CONTRAST 5/26/2024 8:49 am TECHNIQUE: CT of the abdomen and pelvis was performed with the administration of intravenous contrast. Multiplanar reformatted images are provided for review. Automated exposure control, iterative reconstruction, and/or weight based adjustment of the mA/kV was utilized to reduce the radiation dose to as low as reasonably achievable. COMPARISON: None. HISTORY: ORDERING SYSTEM PROVIDED HISTORY: abdominal pain, vomtiing, diarrha TECHNOLOGIST PROVIDED HISTORY: abdominal pain, vomtiing, diarrha Decision Support Exception - unselect if not a suspected or confirmed emergency medical condition->Emergency Medical Condition (MA) Reason for Exam: abdominal pain, vomtiing,

## 2024-06-07 NOTE — PROGRESS NOTES
DANIA Gaston NP notified patient is having elevated blood pressures despite prn labetolol. See new orders.

## 2024-06-07 NOTE — CARE COORDINATION
Patient accepted to ARU when medically ready for discharge. Per Harvey in admissions, anticipate bed will be available on Monday 6/10.

## 2024-06-07 NOTE — PROGRESS NOTES
Resident team updated that patient having about 300mL urine out per 12 hours. Bladder scan and straight cath per protocol,

## 2024-06-07 NOTE — PROGRESS NOTES
[facility] -   Infectious diseases evaluation  admission date 5/26/2024    reason for consultation:   High fever, unknown etiology sepsis    Impression :   Current:  Haemophilus influenza meningitis/ bacteremia  New fever/leukocytosis/mental status changes  Possible mastoiditis and sinusitis on CT head  Acute /subacute infarct in the left posterior frontal lobe   Sepsis   Colitis  Diabetes mellitus  Chronic kidney disease  Hyperlipidemia  Hypertension  GERD  Pagan      Discussion:  Status post lumbar puncture 5/29, cloudy fluid, 280 WBC, meningitis panel was positive for haemophilus influenza.      HENCE:   Continue Ceftriaxone 2 gm IV every 12 hours   Continue IV vancomycin  Repeat blood cultures from yesterday no growth to date  Respiratory panel with COVID-19 - 6/6/2024  UA yesterday showed 3-5 WBC, trace leukocyte esterase, many yeast  Chest x-ray from yesterday showed no acute process  Lactic acid was 1 yesterday  Procalcitonin level 0.18 yesterday  No obvious vegetations per 2 d echo.   Follow CBC and renal function closely.  No growth on blood cultures from 5/28/2024.  Consider ENT evaluation  Discussed with nursing staff  Supportive care.        Infection Control Recommendations   [place] Precautions      Antimicrobial Stewardship Recommendations   Simplification of therapy  Targeted therapy      History of Present Illness:   Initial history:  Kavya De Santiago is a 64 y.o.-year-old female was brought to the hospital for altered mental status associated with nausea, vomiting and diarrhea.  The patient is intubated, unable to provide history that was obtained from chart review and  at the bedside had upper respiratory symptoms, congestion, headache and rhinorrhea for 1 week associated with dry cough and decreased appetite.  The patient was found on the ground on the day of admission, EMS called.  The patient was intubated at the ER.  The patient was  History:   Diagnosis Date    Cervical spondylosis 2009    c-4 c-5, c-5 c-6, c-6 c-7    CKD (chronic kidney disease)     per pt, does not have nephrologist    COVID-19 2021    nasal congestion, fatique and myalgia x 3 weeks    Fall     PATIENT FELL 2 WEEKS AGO LANDED ON TAILBONE    Generalized anxiety disorder 2020    GERD (gastroesophageal reflux disease)     History of recent hospitalization 2022    til 10/9/22 at [facility]    History of swelling of feet     lasix QOD for this    Hyperlipidemia     Hypertension     Left hand weakness     and pain, can not make fist d/t cubital tunnel issue    Lumbar radiculopathy 11/10/2021    was following with pain management and has had epidural injections    DIEGO (nonalcoholic steatohepatitis) 10/12/2014    Obesity     Osteoarthritis of left knee 2014    Recurrent major depressive disorder (HCC) 07/15/2022    Restless legs syndrome     Snores 2022    was seen by Dr. Asencio for likely sleep apnea while hospitalized , to follow up as OP for sleep study, has not been done    TIA (transient ischemic attack)     no residual symptoms    Type II or unspecified type diabetes mellitus without mention of complication, not stated as uncontrolled     Wears glasses     Wellness examination     PCP, Dr. Hamilton, last seen       Past Surgical  History:     Past Surgical History:   Procedure Laterality Date    CARPAL TUNNEL RELEASE Left 2022    CUBITAL TUNNEL DECOMPRESSION performed by Candice Harvey DO at [facility] OR     SECTION      x2    COLONOSCOPY  2012    ? polyps per pt    HYSTERECTOMY, TOTAL ABDOMINAL (CERVIX REMOVED)      with BSO    OTHER SURGICAL HISTORY Left 2022    cubital tunnel release    SHOULDER SURGERY Left 2021    SHOULDER MANIPULATION WITH PRE OP INTERSCALENE BLOCK and intraop injection performed by Ahsan Brown DO at [facility] OR    SPINE SURGERY Left 10/03/2022    S1 nerve root injection,

## 2024-06-07 NOTE — PROGRESS NOTES
DANIA Gaston NP updated patient is now much more alert and only on low doses of precedex. Patient is oriented to self, time, and place. Restraints discontinued at this time per DANIA Gaston NP. Patient educated to hit her call light if she needs assistance and not get up on her own. Patient verbalizes understanding at this time.

## 2024-06-07 NOTE — PROGRESS NOTES
ICU Progress Note (Non-Vent)  [facility]    Patient - Kavya De Santiago,  Age - 64 y.o.    - 1960      Room Number -    MRN -  [id number]   [facility] # - [id number]  Date of Admission -  2024  8:51 AM    Events of Past 24 Hours     On low-dose Precedex, mental status now seems to be back to where it was 2 days ago.  Does not recall the events from yesterday.  Was not agitated overnight.  Oriented to self and place.    Vitals    height is 1.575 m (5' 2\") and weight is 99 kg (218 lb 4.1 oz). Her oral temperature is 98.5 °F (36.9 °C). Her blood pressure is 101/39 (abnormal) and her pulse is 70. Her respiration is 25 and oxygen saturation is 97%.       Temperature Range: Temp: 98.5 °F (36.9 °C) Temp  Av.1 °F (36.7 °C)  Min: 97.8 °F (36.6 °C)  Max: 98.5 °F (36.9 °C)  BP Range:  Systolic (24hrs), Av , Min:101 , Max:179     Diastolic (24hrs), Av, Min:39, Max:90    Pulse Range: Pulse  Av.2  Min: 55  Max: 88  Respiration Range: Resp  Av.4  Min: 19  Max: 31  Current Pulse Ox::  SpO2: 97 %  24HR Pulse Ox Range:  SpO2  Av.6 %  Min: 94 %  Max: 99 %  Oxygen Amount and Delivery: O2 Flow Rate (L/min): 2 L/min    Wt Readings from Last 3 Encounters:   24 99 kg (218 lb 4.1 oz)   24 91.4 kg (201 lb 9.6 oz)   10/03/23 90.1 kg (198 lb 9.6 oz)     I/O     Intake/Output Summary (Last 24 hours) at 2024 0808  Last data filed at 2024 0544  Gross per 24 hour   Intake 2294.92 ml   Output 625 ml   Net 1669.92 ml       DRAIN/TUBE OUTPUT       Invasive Lines   ICP PRESSURE RANGE  No data recorded  CVP PRESSURE RANGE  No data recorded      Medications      hydrALAZINE  50 mg Oral 3 times per day    vancomycin  1,500 mg IntraVENous Q18H    vancomycin (VANCOCIN) intermittent dosing (placeholder)   Other RX Placeholder    QUEtiapine  25 mg Oral Nightly    carbamide peroxide  5  05/26/2024 09:09 PM        BNP   No results for input(s): \"BNP\" in the last 72 hours.     Cultures       Radiology     CT of brain is pending      SYSTEMS ASSESSMENT    Acute hypoxic respiratory failure, intubated 5/26 mostly for fever and altered mental status; extubated 6/2  Sepsis-initially in shock with H influenza bacteremia and meningitis-CSF fluid positive by molecular panel for H influenza, ill for approximately 1 week but refused to come to the hospital per family  Acute to subacute infarcts in the left posterior frontal lobe  Mastoiditis/sinusitis on most recent CT of the head 6/6  Lactic acidosis-resolved  Type 2 diabetes  Questionable colitis seen on CT scan abdomen/pelvis rectal tube in place  Echo 5/28 shows normal ejection fraction but RVSP of 52  Type 2 diabetes  Chronic back pain had been on opiates and gabapentin in the past-follows with neurology, neurosurgery and pain management Dr. Granados  History of hypertension  KVNG-clinical diagnosis no sleep study has been done, never followed up with us in the office, sleep study ordered in 2015, 2022 and again in 2023 but I do not see that it was ever completed, does snore per her   Restless leg syndrome-improved with increased ropinirole  Chronic pain syndrome/depression on Lyrica, Requip, Effexor and BuSpar  Nonmorbid obesity  COVID/influenza swab negative on admission, respiratory viral panel 6/6 was also negative  Full CODE STATUS    Continues to have delirium waxing and waning, today is a good day  Okay to proceed with EMANUEL from my standpoint moderate risk due to KVNG  Repeat respiratory viral panel was negative  Will increase Seroquel to 50 mg nightly  Wean off Precedex drip  High blood pressure better controlled but would be careful about lowering it too quickly    Critical Care Time   35 min    Electronically signed by Rosendo Huffman MD on 6/7/2024 at 8:35 AM

## 2024-06-07 NOTE — ANESTHESIA PRE PROCEDURE
kidney disease)     per pt, does not have nephrologist   • COVID-19 2021    nasal congestion, fatique and myalgia x 3 weeks   • Fall     PATIENT FELL 2 WEEKS AGO LANDED ON TAILBONE   • Generalized anxiety disorder 2020   • GERD (gastroesophageal reflux disease)    • History of recent hospitalization 2022    til 10/9/22 at [facility]   • History of swelling of feet     lasix QOD for this   • Hyperlipidemia    • Hypertension    • Left hand weakness     and pain, can not make fist d/t cubital tunnel issue   • Lumbar radiculopathy 11/10/2021    was following with pain management and has had epidural injections   • DIEGO (nonalcoholic steatohepatitis) 10/12/2014   • Obesity    • Osteoarthritis of left knee 2014   • Recurrent major depressive disorder (HCC) 07/15/2022   • Restless legs syndrome    • Snores 2022    was seen by Dr. Asencio for likely sleep apnea while hospitalized , to follow up as OP for sleep study, has not been done   • TIA (transient ischemic attack)     no residual symptoms   • Type II or unspecified type diabetes mellitus without mention of complication, not stated as uncontrolled    • Wears glasses    • Wellness examination     PCP, Dr. Hamilton, last seen       Past Surgical History:        Procedure Laterality Date   • CARPAL TUNNEL RELEASE Left 2022    CUBITAL TUNNEL DECOMPRESSION performed by Candice Harvey DO at [facility] OR   •  SECTION      x2   • COLONOSCOPY  2012    ? polyps per pt   • HYSTERECTOMY, TOTAL ABDOMINAL (CERVIX REMOVED)      with BSO   • OTHER SURGICAL HISTORY Left 2022    cubital tunnel release   • SHOULDER SURGERY Left 2021    SHOULDER MANIPULATION WITH PRE OP INTERSCALENE BLOCK and intraop injection performed by Ahsan Brown DO at [facility] OR   • SPINE SURGERY Left 10/03/2022    S1 nerve root injection, Dr. Alston   • SPINE SURGERY Left 10/06/2022    S1 NERVE ROOT INJECTION performed by Rema Alston MD

## 2024-06-07 NOTE — PROGRESS NOTES
Physical Therapy Cancel Note      DATE: 2024    NAME: Kavya De Santiago  MRN: [id number]   : 1960      Patient not seen this date for Physical Therapy due to:    Patient is not appropriate for PT evaluation/treatment at this time d/t Per RN. Attempt made @ 4142.       Electronically signed by Rossana Bailey PTA on 2024 at 2:20 PM

## 2024-06-07 NOTE — PROCEDURES
PROCEDURE NOTE  Date: 6/7/2024   Name: Kavya De Santiago  YOB: 1960    Procedures: EMANUEL/transesophageal echocardiogram    Indication for procedure: Bacteremia, positive blood cultures suspected endocarditis, CVA    Anesthesia: Conscious anesthesia provided by anesthesiology    EMANUEL performing physician: Sonali Zelaya    Complication: None    Blood loss: None    Procedures finding:  No evidence of endocarditis involving mitral, tricuspid, aortic or pulmonary  Valve normal LV systolic function  No intra-atrial shunt noted    Procedures description    Informed and written consent was obtained Case discussed with the patient and patient's .  Lidocaine spray was used for oropharyngeal anesthesia.  Preprocedure patient was alert, cooperative, hemodynamically stable, oxygen saturation 98% room air, ECG monitor sinus rhythm  Patient received IV propofol given by anesthesiologist and also IV lidocaine  Gag reflex was checked, it was negative, oropharyngeal suctioning was performed and then transesophageal echo probe was advanced to the esophagus without any problem  Throughout the procedure patient was hemodynamically stable  Recurrent oropharyngeal suction was done  After the procedure patient woke up and no hemodynamic problem  ECG monitor sinus rhythm oxygen saturation 98% on 3 L  No focal neurodeficit  Postprocedure orders were given  Patient tolerated procedure very well no obvious complication.

## 2024-06-08 LAB
ANION GAP SERPL CALCULATED.3IONS-SCNC: 18 MMOL/L (ref 9–17)
BASOPHILS # BLD: 0.1 K/UL (ref 0–0.2)
BASOPHILS NFR BLD: 1 % (ref 0–2)
BUN SERPL-MCNC: 8 MG/DL (ref 8–23)
CALCIUM SERPL-MCNC: 7.7 MG/DL (ref 8.6–10.4)
CHLORIDE SERPL-SCNC: 105 MMOL/L (ref 98–107)
CO2 SERPL-SCNC: 15 MMOL/L (ref 20–31)
CREAT SERPL-MCNC: 0.6 MG/DL (ref 0.5–0.9)
EOSINOPHIL # BLD: 0.4 K/UL (ref 0–0.4)
EOSINOPHILS RELATIVE PERCENT: 4 % (ref 0–4)
ERYTHROCYTE [DISTWIDTH] IN BLOOD BY AUTOMATED COUNT: 15.4 % (ref 11.5–14.9)
GFR, ESTIMATED: >90 ML/MIN/1.73M2
GLUCOSE BLD-MCNC: 156 MG/DL (ref 65–105)
GLUCOSE BLD-MCNC: 160 MG/DL (ref 65–105)
GLUCOSE BLD-MCNC: 162 MG/DL (ref 65–105)
GLUCOSE BLD-MCNC: 167 MG/DL (ref 65–105)
GLUCOSE BLD-MCNC: 168 MG/DL (ref 65–105)
GLUCOSE SERPL-MCNC: 168 MG/DL (ref 70–99)
HCT VFR BLD AUTO: 27.7 % (ref 36–46)
HGB BLD-MCNC: 8.4 G/DL (ref 12–16)
LYMPHOCYTES NFR BLD: 2.1 K/UL (ref 1–4.8)
LYMPHOCYTES RELATIVE PERCENT: 17 % (ref 24–44)
MCH RBC QN AUTO: 27.2 PG (ref 26–34)
MCHC RBC AUTO-ENTMCNC: 30.3 G/DL (ref 31–37)
MCV RBC AUTO: 89.9 FL (ref 80–100)
MONOCYTES NFR BLD: 1 K/UL (ref 0.1–1.3)
MONOCYTES NFR BLD: 8 % (ref 1–7)
NEUTROPHILS NFR BLD: 70 % (ref 36–66)
NEUTS SEG NFR BLD: 8.6 K/UL (ref 1.3–9.1)
PLATELET # BLD AUTO: 396 K/UL (ref 150–450)
PMV BLD AUTO: 7.1 FL (ref 6–12)
POTASSIUM SERPL-SCNC: 3.1 MMOL/L (ref 3.7–5.3)
POTASSIUM SERPL-SCNC: 4.5 MMOL/L (ref 3.7–5.3)
RBC # BLD AUTO: 3.08 M/UL (ref 4–5.2)
SODIUM SERPL-SCNC: 138 MMOL/L (ref 135–144)
WBC OTHER # BLD: 12.2 K/UL (ref 3.5–11)

## 2024-06-08 PROCEDURE — 80048 BASIC METABOLIC PNL TOTAL CA: CPT

## 2024-06-08 PROCEDURE — 6360000002 HC RX W HCPCS: Performed by: NURSE PRACTITIONER

## 2024-06-08 PROCEDURE — 6370000000 HC RX 637 (ALT 250 FOR IP): Performed by: NURSE PRACTITIONER

## 2024-06-08 PROCEDURE — 6360000002 HC RX W HCPCS: Performed by: INTERNAL MEDICINE

## 2024-06-08 PROCEDURE — 51701 INSERT BLADDER CATHETER: CPT

## 2024-06-08 PROCEDURE — 2580000003 HC RX 258: Performed by: INTERNAL MEDICINE

## 2024-06-08 PROCEDURE — 6370000000 HC RX 637 (ALT 250 FOR IP)

## 2024-06-08 PROCEDURE — 6360000002 HC RX W HCPCS

## 2024-06-08 PROCEDURE — 97130 THER IVNTJ EA ADDL 15 MIN: CPT

## 2024-06-08 PROCEDURE — 6370000000 HC RX 637 (ALT 250 FOR IP): Performed by: INTERNAL MEDICINE

## 2024-06-08 PROCEDURE — 85025 COMPLETE CBC W/AUTO DIFF WBC: CPT

## 2024-06-08 PROCEDURE — 97129 THER IVNTJ 1ST 15 MIN: CPT

## 2024-06-08 PROCEDURE — 51798 US URINE CAPACITY MEASURE: CPT

## 2024-06-08 PROCEDURE — A4216 STERILE WATER/SALINE, 10 ML: HCPCS | Performed by: INTERNAL MEDICINE

## 2024-06-08 PROCEDURE — 84132 ASSAY OF SERUM POTASSIUM: CPT

## 2024-06-08 PROCEDURE — 99232 SBSQ HOSP IP/OBS MODERATE 35: CPT | Performed by: INTERNAL MEDICINE

## 2024-06-08 PROCEDURE — 99232 SBSQ HOSP IP/OBS MODERATE 35: CPT | Performed by: PSYCHIATRY & NEUROLOGY

## 2024-06-08 PROCEDURE — C9113 INJ PANTOPRAZOLE SODIUM, VIA: HCPCS | Performed by: INTERNAL MEDICINE

## 2024-06-08 PROCEDURE — 2060000000 HC ICU INTERMEDIATE R&B

## 2024-06-08 PROCEDURE — 82947 ASSAY GLUCOSE BLOOD QUANT: CPT

## 2024-06-08 PROCEDURE — 2580000003 HC RX 258

## 2024-06-08 PROCEDURE — 36415 COLL VENOUS BLD VENIPUNCTURE: CPT

## 2024-06-08 RX ADMIN — BUSPIRONE HYDROCHLORIDE 10 MG: 10 TABLET ORAL at 14:54

## 2024-06-08 RX ADMIN — ATORVASTATIN CALCIUM 40 MG: 40 TABLET, FILM COATED ORAL at 20:51

## 2024-06-08 RX ADMIN — LISINOPRIL 40 MG: 20 TABLET ORAL at 09:33

## 2024-06-08 RX ADMIN — ENOXAPARIN SODIUM 30 MG: 100 INJECTION SUBCUTANEOUS at 09:34

## 2024-06-08 RX ADMIN — HYDRALAZINE HYDROCHLORIDE 50 MG: 50 TABLET ORAL at 20:51

## 2024-06-08 RX ADMIN — Medication 5 DROP: at 10:57

## 2024-06-08 RX ADMIN — SODIUM CHLORIDE, PRESERVATIVE FREE 10 ML: 5 INJECTION INTRAVENOUS at 21:04

## 2024-06-08 RX ADMIN — ANTI-FUNGAL POWDER MICONAZOLE NITRATE TALC FREE: 1.42 POWDER TOPICAL at 09:35

## 2024-06-08 RX ADMIN — ROPINIROLE HYDROCHLORIDE 2 MG: 2 TABLET, FILM COATED ORAL at 09:33

## 2024-06-08 RX ADMIN — BUSPIRONE HYDROCHLORIDE 10 MG: 10 TABLET ORAL at 20:51

## 2024-06-08 RX ADMIN — SODIUM CHLORIDE, PRESERVATIVE FREE 10 ML: 5 INJECTION INTRAVENOUS at 09:34

## 2024-06-08 RX ADMIN — VANCOMYCIN HYDROCHLORIDE 1250 MG: 1.25 INJECTION, POWDER, LYOPHILIZED, FOR SOLUTION INTRAVENOUS at 16:44

## 2024-06-08 RX ADMIN — HYDRALAZINE HYDROCHLORIDE 50 MG: 50 TABLET ORAL at 14:54

## 2024-06-08 RX ADMIN — ENOXAPARIN SODIUM 30 MG: 100 INJECTION SUBCUTANEOUS at 20:51

## 2024-06-08 RX ADMIN — VANCOMYCIN HYDROCHLORIDE 1250 MG: 1.25 INJECTION, POWDER, LYOPHILIZED, FOR SOLUTION INTRAVENOUS at 01:50

## 2024-06-08 RX ADMIN — QUETIAPINE FUMARATE 50 MG: 50 TABLET ORAL at 20:51

## 2024-06-08 RX ADMIN — ROPINIROLE HYDROCHLORIDE 2 MG: 2 TABLET, FILM COATED ORAL at 20:50

## 2024-06-08 RX ADMIN — BUSPIRONE HYDROCHLORIDE 10 MG: 10 TABLET ORAL at 09:34

## 2024-06-08 RX ADMIN — ANTI-FUNGAL POWDER MICONAZOLE NITRATE TALC FREE: 1.42 POWDER TOPICAL at 21:06

## 2024-06-08 RX ADMIN — POTASSIUM CHLORIDE 40 MEQ: 1500 TABLET, EXTENDED RELEASE ORAL at 06:10

## 2024-06-08 RX ADMIN — METOPROLOL 100 MG: 100 TABLET ORAL at 09:34

## 2024-06-08 RX ADMIN — CEFTRIAXONE SODIUM 2000 MG: 2 INJECTION, POWDER, FOR SOLUTION INTRAMUSCULAR; INTRAVENOUS at 18:41

## 2024-06-08 RX ADMIN — INSULIN GLARGINE 20 UNITS: 100 INJECTION, SOLUTION SUBCUTANEOUS at 09:34

## 2024-06-08 RX ADMIN — HYDRALAZINE HYDROCHLORIDE 50 MG: 50 TABLET ORAL at 06:10

## 2024-06-08 RX ADMIN — LATANOPROST 1 DROP: 50 SOLUTION OPHTHALMIC at 22:09

## 2024-06-08 RX ADMIN — HYDRALAZINE HYDROCHLORIDE 10 MG: 20 INJECTION, SOLUTION INTRAMUSCULAR; INTRAVENOUS at 03:19

## 2024-06-08 RX ADMIN — CEFTRIAXONE SODIUM 2000 MG: 2 INJECTION, POWDER, FOR SOLUTION INTRAMUSCULAR; INTRAVENOUS at 04:46

## 2024-06-08 RX ADMIN — HYDRALAZINE HYDROCHLORIDE 10 MG: 20 INJECTION, SOLUTION INTRAMUSCULAR; INTRAVENOUS at 17:06

## 2024-06-08 RX ADMIN — METOPROLOL 100 MG: 100 TABLET ORAL at 20:51

## 2024-06-08 RX ADMIN — PANTOPRAZOLE SODIUM 40 MG: 40 INJECTION, POWDER, FOR SOLUTION INTRAVENOUS at 09:33

## 2024-06-08 RX ADMIN — ACETAMINOPHEN 650 MG: 325 TABLET ORAL at 10:57

## 2024-06-08 RX ADMIN — ASPIRIN 81 MG: 81 TABLET, COATED ORAL at 09:34

## 2024-06-08 ASSESSMENT — PAIN SCALES - GENERAL
PAINLEVEL_OUTOF10: 0
PAINLEVEL_OUTOF10: 2
PAINLEVEL_OUTOF10: 0
PAINLEVEL_OUTOF10: 3
PAINLEVEL_OUTOF10: 0
PAINLEVEL_OUTOF10: 2

## 2024-06-08 ASSESSMENT — PAIN DESCRIPTION - LOCATION
LOCATION: HEAD
LOCATION: HEAD

## 2024-06-08 NOTE — PROGRESS NOTES
SPEECH LANGUAGE PATHOLOGY  Speech Language Pathology  ST PROGRESSIVE CARE    Cognitive Treatment Note    Date: 6/8/2024  Patient’s Name: Kavya De Santiago  MRN: [id number]  Diagnosis:   Patient Active Problem List   Diagnosis Code    Cervical spondylosis M47.812    Type 2 diabetes mellitus without complication, without long-term current use of insulin ([facility]) E11.9    Hypertension I10    Abnormal auditory perception H93.299    Nasal polyps J33.9    Osteoarthritis of left knee M17.12    Osteoarthritis of right knee M17.11    DIEGO (nonalcoholic steatohepatitis) K75.81    Vitamin D deficiency E55.9    Iron deficiency anemia D50.9    Dyslipidemia E78.5    Trochanteric bursitis M70.60    Chronic left shoulder pain M25.512, G89.29    Restless legs syndrome G25.81    Generalized anxiety disorder F41.1    Acute encephalopathy G93.40    Class 2 obesity in adult E66.9    Leg edema R60.0    Recurrent major depressive disorder ([facility]) F33.9    Dermatitis L30.9    Retrolisthesis of vertebrae M43.10    Bilateral carpal tunnel syndrome G56.03    Intention tremor G25.2    Sciatica M54.30    Back pain with radiation M54.9    Left sciatic nerve pain M54.32    Lumbar disc disease with radiculopathy M51.16    Intractable back pain M54.9    Piriformis syndrome of left side G57.02    Ulnar neuropathy at elbow, left G56.22    S/P decompression of ulnar nerve at elbow Z98.890    Cervical myelopathy with cervical radiculopathy ([facility]) G95.9, M54.12    Lumbar spondylosis M47.816    Right median nerve neuropathy G56.11    Sacroiliitis ([facility]) M46.1    Secondary diabetes mellitus with stage 2 chronic kidney disease (GFR 60-89) ([facility]) E13.22, N18.2    Benign hypertension with CKD (chronic kidney disease) stage III ([facility]) I12.9, N18.30    Severe obesity (BMI 35.0-39.9) with comorbidity ([facility]) E66.01    Stenosis of lateral recess of lumbosacral spine M48.07    Type 2 diabetes mellitus with chronic kidney disease E11.22    Sacroiliac joint pain M53.3

## 2024-06-08 NOTE — PLAN OF CARE
RN  Outcome: Progressing  Flowsheets (Taken 6/7/2024 1509)  Effect of thought disturbance (confusion, delirium, dementia, or psychosis) are managed with adequate functional status:   Assess for contributors to thought disturbance, including medications, impaired vision or hearing, underlying metabolic abnormalities, dehydration, psychiatric diagnoses, notify LIP   Echo high risk fall precautions, as indicated   Provide frequent short contacts to provide reality reorientation, refocusing and direction   Decrease environmental stimuli, including noise as appropriate   Monitor and intervene to maintain adequate nutrition, hydration, elimination, sleep and activity     Problem: Genitourinary - Adult  Goal: Absence of urinary retention  6/8/2024 0249 by Arianna Joseph, RN  Outcome: Progressing  6/7/2024 1509 by Kristi Martel RN  Outcome: Not Progressing  Flowsheets (Taken 6/7/2024 1509)  Absence of urinary retention:   Monitor intake/output and perform bladder scan as needed   Assess patient’s ability to void and empty bladder   Place urinary catheter per Licensed Independent Practitioner order if needed   Discuss with Licensed Independent Practitioner  medications to alleviate retention as needed

## 2024-06-08 NOTE — PROGRESS NOTES
[facility] -   Infectious diseases evaluation  admission date 5/26/2024    reason for consultation:   High fever, unknown etiology sepsis    Impression :   Current:  Haemophilus influenza meningitis/ bacteremia  Fever/leukocytosis  Possible mastoiditis and sinusitis on CT head  Acute /subacute infarct in the left posterior frontal lobe   Sepsis   Colitis  Diabetes mellitus  Chronic kidney disease  Hyperlipidemia  Hypertension  GERD  Pagan      Discussion:  Status post lumbar puncture 5/29, cloudy fluid, 280 WBC, meningitis panel was positive for haemophilus influenza.      HENCE:   Continue Ceftriaxone 2 gm IV every 12 hours   Continue IV vancomycin  Repeat blood cultures from y no growth to date  Respiratory panel with COVID-19 - 6/6/2024  UA 6/6 showed 3-5 WBC, trace leukocyte esterase, many yeast  Chest x-ray from 6/6 showed no acute process  Procalcitonin level 0.18 on 6/6  No obvious vegetations per 2 d echo.   EMANUEL 6/7/2024 showed no vegetations  Follow CBC and renal function closely.  No growth on blood cultures from 5/28/2024.  Consider ENT evaluation  Discussed with nursing staff  Supportive care.        Infection Control Recommendations   [place] Precautions      Antimicrobial Stewardship Recommendations   Simplification of therapy  Targeted therapy      History of Present Illness:   Initial history:  Kavya De Santiago is a 64 y.o.-year-old female was brought to the hospital for altered mental status associated with nausea, vomiting and diarrhea.  The patient is intubated, unable to provide history that was obtained from chart review and  at the bedside had upper respiratory symptoms, congestion, headache and rhinorrhea for 1 week associated with dry cough and decreased appetite.  The patient was found on the ground on the day of admission, EMS called.  The patient was intubated at the ER.  The patient was hypothermic initially then has been running  high-grade fever with temperature max of 105, hypotensive, on pressors.  CT head showed no acute abnormality, CTA chest showed no evidence of PE, CT abdomen pelvis showed pancolitis with abnormal wall thickening of the ascending, transverse, descending colon, sigmoid colon and rectum.   Initial lactic acid 3.3, WBC 11.5, procalcitonin 11.4  COVID-19 influenza combo negative.  Blood cultures grew Haemophilus influenzae    Interval changes  6/8/2024   The patient was seen and examined at the progressive care unit today, awake and oriented, afebrile today, temperature max 100.7 yesterday.  No growth on repeat blood culture from 6/6/2024  CT head 6/6 showed 1. No acute intracranial process identified.  2. Fluid opacification of the middle ears and mastoid air cells, correlation  with any signs or symptoms of otomastoiditis is recommended.  3. Fluid levels within the frontal sinuses suggesting acute frontal sinusitis.  Haemophilus influenza sensitivity pattern, sensitive to ampicillin, ceftriaxone with RADHA of <= 0.06 and meropenem  MRI of the brain showed tiny acute/subacute infarct in the left posterior frontal lobe   Patient Vitals for the past 8 hrs:   BP Temp Temp src Pulse Resp SpO2 Weight   06/08/24 0845 -- -- -- -- -- -- 102.7 kg (226 lb 6.6 oz)   06/08/24 0830 (!) 158/77 98.3 °F (36.8 °C) Oral 95 22 96 % --   06/08/24 0610 (!) 172/72 -- -- -- -- -- --   06/08/24 0600 (!) 172/72 -- -- 93 (!) 32 96 % --   06/08/24 0500 (!) 148/95 -- -- 91 28 96 % --         RUE PICC site clean.  [place].  Urine clear yellow.  Blood cx from 5/28, CSF cx from 5/29 remain negative to date.    6/1 UA showing No bacteria, 10-20 RBC, 0-2 WBC, Nitrite negative, LE Negative.    6/1 CT head: No acute intracranial abnormality. Mucosal thickening and fluid throughout the paranasal sinuses.  Partial opacification of the bilateral mastoid air cells.    6/1 CTA head and neck: No significant stenosis or occlusion of the major arterial vessels of

## 2024-06-08 NOTE — CARE COORDINATION
ONGOING DISCHARGE PLANNING NOTE:    Writer reviewed LSW notes, and discharge plan is to DC to ARU.     Anticipate Bed available on Monday 6/10/24.     Will continue to follow for additional discharge needs.    Electronically signed by Indigo Rios RN on 6/8/2024 at 1:35 PM

## 2024-06-08 NOTE — PROGRESS NOTES
Vancomycin Dosing by Pharmacy - Daily Note   Vancomycin Therapy Day:  2  Indication: Sepsis, meningitis -, in blood - HAEMOPHILUS INFLUENZAE   Allergies:  Codeine   Actual Weight:    Wt Readings from Last 1 Encounters:   06/04/24 99 kg (218 lb 4.1 oz)       Labs/Ancillary Data  Estimated Creatinine Clearance: 91 mL/min (based on SCr of 0.7 mg/dL).  Recent Labs     06/05/24  0406 06/05/24  0525 06/06/24  0349 06/07/24  0438   CREATININE 0.6  --  0.8 0.7   BUN 9  --  12 12   WBC  --  14.0* 13.8* 9.4     Procalcitonin   Date Value Ref Range Status   06/06/2024 0.18 (H) <0.09 ng/mL Final     Comment:           Suspected Sepsis:  <0.50 ng/mL     Low likelihood of sepsis.  0.50-2.00 ng/mL     Increased likelihood of sepsis. Antibiotics encouraged.  >2.00 ng/mL     High risk of sepsis/shock. Antibiotics strongly encouraged.        Suspected Lower Resp Tract Infections:  <0.24 ng/mL     Low likelihood of bacterial infection.  >0.24 ng/mL     Increased likelihood of bacterial infection. Antibiotics encouraged.        With successful antibiotic therapy, PCT levels should decrease rapidly. (Half-life of 24 to   36 hours.)        Procalcitonin values from samples collected within the first 6 hours of systemic infection   may still be low. Retesting may be indicated.  Values from day 1 and day 4 can be entered into the Change in Procalcitonin Calculator   (www.[employer]-pct-calculator.[employer]) to determine the patient's Mortality Risk Prognosis        In healthy neonates, plasma Procalcitonin (PCT) concentrations increase gradually after   birth, reaching peak values at about 24 hours of age then decrease to normal values below   0.5 ng/mL by 48-72 hours of age.         Intake/Output Summary (Last 24 hours) at 6/7/2024 2108  Last data filed at 6/7/2024 1700  Gross per 24 hour   Intake 3646.97 ml   Output 375 ml   Net 3271.97 ml     Temp: 100.7 F    Culture Date / Source  /  Results  See micro  Recent vancomycin administrations

## 2024-06-08 NOTE — PLAN OF CARE
and Co-morbidities  Goal: Patient's chronic conditions and co-morbidity symptoms are monitored and maintained or improved  Outcome: Progressing     Problem: Nutrition Deficit:  Goal: Optimize nutritional status  Outcome: Progressing     Problem: Neurosensory - Adult  Goal: Achieves stable or improved neurological status  Outcome: Progressing  Goal: Achieves maximal functionality and self care  Outcome: Progressing     Problem: Respiratory - Adult  Goal: Achieves optimal ventilation and oxygenation  Outcome: Progressing     Problem: Musculoskeletal - Adult  Goal: Return mobility to safest level of function  Outcome: Progressing  Goal: Return ADL status to a safe level of function  Outcome: Progressing     Problem: Genitourinary - Adult  Goal: Absence of urinary retention  Outcome: Progressing  Goal: Urinary catheter remains patent  Outcome: Progressing     Problem: Metabolic/Fluid and Electrolytes - Adult  Goal: Electrolytes maintained within normal limits  Outcome: Progressing  Goal: Glucose maintained within prescribed range  Outcome: Progressing     Problem: Anxiety  Goal: Will report anxiety at manageable levels  Description: INTERVENTIONS:  1. Administer medication as ordered  2. Teach and rehearse alternative coping skills  3. Provide emotional support with 1:1 interaction with staff  Outcome: Progressing     Problem: Coping  Goal: Pt/Family able to verbalize concerns and demonstrate effective coping strategies  Description: INTERVENTIONS:  1. Assist patient/family to identify coping skills, available support systems and cultural and spiritual values  2. Provide emotional support, including active listening and acknowledgement of concerns of patient and caregivers  3. Reduce environmental stimuli, as able  4. Instruct patient/family in relaxation techniques, as appropriate  5. Assess for spiritual pain/suffering and initiate Spiritual Care, Psychosocial Clinical Specialist consults as needed  Outcome:  Progressing     Problem: Confusion  Goal: Confusion, delirium, dementia, or psychosis is improved or at baseline  Description: INTERVENTIONS:  1. Assess for possible contributors to thought disturbance, including medications, impaired vision or hearing, underlying metabolic abnormalities, dehydration, psychiatric diagnoses, and notify attending LIP  2. [place] high risk fall precautions, as indicated  3. Provide frequent short contacts to provide reality reorientation, refocusing and direction  4. Decrease environmental stimuli, including noise as appropriate  5. Monitor and intervene to maintain adequate nutrition, hydration, elimination, sleep and activity  6. If unable to ensure safety without constant attention obtain sitter and review sitter guidelines with assigned personnel  7. Initiate Psychosocial CNS and Spiritual Care consult, as indicated  Outcome: Progressing

## 2024-06-08 NOTE — PROGRESS NOTES
Vancomycin Dosing by Pharmacy - Daily Note   Vancomycin Therapy Day:  3  Indication: Meningitis /Baceremia    Allergies:  Codeine   Actual Weight:  99kg  Wt Readings from Last 1 Encounters:   06/04/24 99 kg (218 lb 4.1 oz)       Labs/Ancillary Data  Estimated Creatinine Clearance: 106 mL/min (based on SCr of 0.6 mg/dL).  Recent Labs     06/06/24  0349 06/07/24  0438 06/08/24  0439   CREATININE 0.8 0.7 0.6   BUN 12 12 8   WBC 13.8* 9.4 12.2*     Procalcitonin   Date Value Ref Range Status   06/06/2024 0.18 (H) <0.09 ng/mL Final     Comment:           Suspected Sepsis:  <0.50 ng/mL     Low likelihood of sepsis.  0.50-2.00 ng/mL     Increased likelihood of sepsis. Antibiotics encouraged.  >2.00 ng/mL     High risk of sepsis/shock. Antibiotics strongly encouraged.        Suspected Lower Resp Tract Infections:  <0.24 ng/mL     Low likelihood of bacterial infection.  >0.24 ng/mL     Increased likelihood of bacterial infection. Antibiotics encouraged.        With successful antibiotic therapy, PCT levels should decrease rapidly. (Half-life of 24 to   36 hours.)        Procalcitonin values from samples collected within the first 6 hours of systemic infection   may still be low. Retesting may be indicated.  Values from day 1 and day 4 can be entered into the Change in Procalcitonin Calculator   ([email or web address].Mozy) to determine the patient's Mortality Risk Prognosis        In healthy neonates, plasma Procalcitonin (PCT) concentrations increase gradually after   birth, reaching peak values at about 24 hours of age then decrease to normal values below   0.5 ng/mL by 48-72 hours of age.         Intake/Output Summary (Last 24 hours) at 6/8/2024 0835  Last data filed at 6/8/2024 0529  Gross per 24 hour   Intake 2633.56 ml   Output 867 ml   Net 1766.56 ml     Temp: 98.6    Culture Date / Source  /  Results  Meningitis Panel   Haemophilus Influenza  Repeat Blood Cultures pending  CSF Neg 3d  Resp Panel Neg  Recent

## 2024-06-08 NOTE — PROGRESS NOTES
ICU Progress Note (Non-Vent)  [facility]    Patient - Kavya De Santiago,  Age - 64 y.o.    - 1960      Room Number -    MRN -  [id number]   [facility] # - [id number]  Date of Admission -  2024  8:51 AM    Events of Past 24 Hours   EMANUEL was negative for any vegetations.  Keppra stopped by neurology.  Patient alert, off oxygen but still appears confused   Hard of hearing  Vitals    height is 1.575 m (5' 2\") and weight is 99 kg (218 lb 4.1 oz). Her axillary temperature is 98.6 °F (37 °C). Her blood pressure is 172/72 (abnormal) and her pulse is 93. Her respiration is 32 (abnormal) and oxygen saturation is 96%.       Temperature Range: Temp: 98.6 °F (37 °C) Temp  Av.1 °F (37.3 °C)  Min: 97.8 °F (36.6 °C)  Max: 100.7 °F (38.2 °C)  BP Range:  Systolic (24hrs), Av , Min:101 , Max:176     Diastolic (24hrs), Av, Min:39, Max:95    Pulse Range: Pulse  Av.9  Min: 70  Max: 101  Respiration Range: Resp  Av.6  Min: 18  Max: 33  Current Pulse Ox::  SpO2: 96 %  24HR Pulse Ox Range:  SpO2  Av.9 %  Min: 93 %  Max: 100 %  Oxygen Amount and Delivery: O2 Flow Rate (L/min): 2 L/min    Wt Readings from Last 3 Encounters:   24 99 kg (218 lb 4.1 oz)   24 91.4 kg (201 lb 9.6 oz)   10/03/23 90.1 kg (198 lb 9.6 oz)     I/O     Intake/Output Summary (Last 24 hours) at 2024 0637  Last data filed at 2024 0529  Gross per 24 hour   Intake 2633.56 ml   Output 867 ml   Net 1766.56 ml       DRAIN/TUBE OUTPUT       Invasive Lines   ICP PRESSURE RANGE  No data recorded  CVP PRESSURE RANGE  No data recorded      Medications      hydrALAZINE  50 mg Oral 3 times per day    QUEtiapine  50 mg Oral Nightly    vancomycin  1,250 mg IntraVENous Q12H    vancomycin (VANCOCIN) intermittent dosing (placeholder)   Other RX Placeholder    carbamide peroxide  5 drop Both Ears BID    metoprolol

## 2024-06-08 NOTE — CONSULTS
Date:   6/8/2024  Patient name: Kavya De Santiago  Date of admission:  5/26/2024  8:51 AM  MRN:   [id number]  YOB: 1960  PCP: Shaina Hamilton MD    Reason for Admission: Colitis [K52.9]    Cardiology consult: Positive blood culture, embolic CVA     Referring physician: Dr. Joel Harris      Impression    1.Admission 5/26/2024 acute hypoxic respiratory failure, intubated 5/26/2020 mostly for fever and altered mental status, extubated 6/2/2024  2.Septic shock with haemophilus influenza bacteremia and meningitis CSF fluid positive by molecular panel for H. influenzae, L for approximately 1 week but refused to come to the hospital per family  3: Acute to subacute infarct in the left posterior frontal lobe  4.  Mastoiditis/sinusitis on most recent CT of the head 6/6/2024  5. Type 2 diabetes  6.  Normal LV function 2D echo 5/28/2024 RVSP 52 mm Hg  7.  Overweight/KVNG  8.  Chronic back pain has been on opiates, gabapentin, Lyrica  9.  History of hypertension  10.  Gastroesophageal reflux disease  11.DIEGO  12.  Anemia normal MCV    History of present illness  64-year-old  female with a past medical history of hypertension, overweight, diabetes mellitus, chronic pain got hospitalized on 5/26/2024 with altered mental status associated with hypoxic respiratory failure, nausea vomiting and diarrhea.  She got intubated in the ER on arrival.  Prior to admission she developed upper respiratory symptoms and congestion and headache about 1 week before admission.  On the day of admission she was found on the ground, EMS was called.  Patient was initially hypothermic and then she developed high-grade fever maximum of 105 she became hypotensive required pressure agent.  CT head showed no acute abnormality, CTA chest showed no evidence of pulmonary embolism.  CT abdomen pelvis showed pancolitis with abnormal wall thickening of ascending, transverse, descending, sigmoid colon and rectum.  Initial lactic acid was 3.3

## 2024-06-08 NOTE — PROGRESS NOTES
6/6 showing mastoiditis and sinusitis.  Cardiac concern about endocarditis, EMANUEL showed no vegetation, ID on board, bump in WBC count to 12, she is already on vancomycin and ceftriaxone, so far blood cultures negative, UA and x-ray negative, will think about getting CT scan of abdomen or will follow ID recommendation regarding it  Afebrile overnight    H. Influenzae Meningitis and bacteremia(improved)  -2 out of 2 blood cultures positive for H. Influenzae  -CSF meningitis panel positive for H. influenzae  -WBC 13.8   -MRSA nasal swab negative, respiratory and urine culture negative  -Droplet isolation D/C patient has normal infectious  -CSF culture no growth 1 day  -CSF elevated WBC, elevated RBC, elevated protein, normal glucose  -Continue Rocephin 2 g every 12 hours since 05/27  -Continue vancomycin  -Infectious disease on board and following, appreciate recommendations  -EMANUEL planned due to concerns of infectious endocarditis, will keep n.p.o. for that     Acute mental status change  -Much more alert and oriented today, currently on Precedex drip of 0.3 mcg/kg per, continue to wean it down  -CT scan 6/6: no acute intracranial process identified fluid opacification of middle ears and mastoid air cells mostly otomastoiditis  -Acute frontal sinusitis will discuss antibiotics with attending and change accordingly.   -ID on board and following the recommendations     Mastoiditis  -CT scan reviewed showing AOM with signs of mastoiditis and sinusitis  -ID on board and following the recommendations  -Continue IV ceftriaxone and vancomycin        New finding acute/subacute stroke on MRI 06/04/2023  -EEG done normal  -Patient Keppra twice daily currently on hold  -Blood pressure goal above 165  -Neurology is on board, they recommend doing EMANUEL to rule out endocarditis         Colitis( improved)  -CT abdomen pelvis shows wall thickening throughout the colon concerning for colitis  -GI panel negative  -C. difficile never  sent  -Continue oral vancomycin since 05/27 for now, discussed with ID  -GI on board and following, appreciate recommendations     Hypertension( improved)  Off Cardene drip   -Lisinopril 40 mg  -Continue metoprolol 75 mg twice daily  -Labetalol 10 mg IV as needed     Septic Shock(Resolved)  -Resolved, off Levophed since 5/27  -Given 2.7 L fluid bolus in the ED  -Fever and episodes of hypothermia  -Sedated and intubated  -Pro-[place] elevated 11.40  -Lactate 3.3>3.4>1.7>1.4, normalized  -UDS positive for benzo and fentanyl     Acute Respiratory Failure resolved  After 7 days being on vent, patient extubated 06/02  -Currently on oxygen of 2 L     T2DM  -Glucose 439 on presentation, given 8 units Humalog in the ED  -Increased Lantus to 20 units daily  -Medium-dose sliding scale  -Hypoglycemia protocol     DVT prophylaxis: Lovenox  GI prophylaxis: Protonix  Diet: Adult   Disposition: TBD     OT/PT/SW Consulted     Code Status: Full    Plan will be discussed with the attending, Dr Sudhir Ferrer MD  PGY I Family Medicine Resident  6/8/2024 7:47 AM

## 2024-06-08 NOTE — PROGRESS NOTES
Attending Physician Statement  I have discussed the care of Kavya De Santiago with the resident team. I have examined the patient myself and taken ros and hpi , including pertinent history and exam findings,  with the resident. I have reviewed the key elements of all parts of the encounter with the resident.  I agree with the assessment, plan and orders as documented by the resident.    Principal Problem:    Severe diarrhea  Active Problems:    Acute encephalopathy    Pancolitis (HCC)    Fever    Acute respiratory failure (HCC)    Colitis    Haemophilus influenzae meningitis    Bacteremia    Sepsis due to Haemophilus influenzae with acute respiratory failure without septic shock (HCC)    Anemia    Elevated LFTs    Unresponsive state    Meningoencephalitis    History of TIA (transient ischemic attack)    Cerebrovascular accident (CVA) ([facility])  Resolved Problems:    * No resolved hospital problems. *      On iv rocephin for h.inf meningitis and bacteremia  EMANUEL neg for vegetation  New fevers, iv vanc has been added.   Pt has been on iv rocephon for meningitis for 11 days.   No other source of fever identified  Pt deneis any symptoms   Complted po vanc for presumed c.diff colitis  Hypokalemia- replace     Electronically signed by Ольга Peguero MD

## 2024-06-08 NOTE — PROGRESS NOTES
[facility] Neurology   IN-PATIENT SERVICE      NEUROLOGY PROGRESS  NOTE            Date:   6/8/2024  Patient name:  Kavya De Santiago  Date of admission:  5/26/2024  YOB: 1960      Interval History:     6/8: Underwent EMANUEL yesterday.  No acute findings.  Mentation continues to improve.  More alert and attentive today.   at bedside agrees.  No new neurological complaints.  Downgraded from ICU.    6/7: Mentation significantly improved today.  Was able to sleep last night.  Precedex has now been completely weaned off.  She is alert and oriented x 3.  No new neurological symptoms.    6/6: Overnight, patient became very agitated and restless, was trying to get out of bed.  She needed to be started on Precedex which was eventually maxed out.  At time of evaluation, she is very lethargic, difficult to awaken.   at bedside.  Repeat CT brain done today.    6/5: Has been having some waxing and waning mentation, continues to say that she is supposed to go to [facility] this weekend.  Intermittently restless/agitated.  No episodes of staring or seizure-like activity.    6/4: Had MRI brain done yesterday.  Continues to have improvement in mentation.  No new neurological deficits.  Working with PT/OT/ST.    6/3: No acute events.  Was extubated yesterday.  This morning, per nursing staff and  at bedside, mentation improved.  She is able to answer questions although slow to respond.  No seizures or seizure-like activity.    History of Present Illness:     Per my partner:    Briefly, this is a  64 y.o. female with hx of HTN, DM, DIEGO and CKD was admitted on 5/26/2024 with altered mentation.  Initial history obtained from EMS report and patient's .  EMS report states that they were called to the patient's house for c/o nausea and vomiting and upon evaluation by EMS noted to be with abnormal glucose concerning for DKA.  While transporting the patient to the hospital; she became completely  abnormality.          I personally reviewed all of the above medications, clinical laboratory, imaging and other diagnostic tests.         Impression:      Acute encephalopathy, in setting of haemophilus influenza meningitis, waxing and waning but now improving. Had transient worsening with agitation, restlessness.  Needed Precedex, now discontinued.  Mentation continues to improve.  No seizures or seizure-like activity.  EEG normal.  Suspect component of delirium.  Was also on empiric Keppra, now dc'd. Exam stable.  Small acute-subacute, left posterior frontal infarct.  Appears cortical/embolic.  This is in setting of bacterial meningitis (can occur due to various mechanisms including inflammatory response, vasculopathy, hypercoagulability).  CTA head and neck with no acute findings.  Infarct is essentially asymptomatic. EMANUEL with no acute findings, no evidence of endocarditis.    Plan:     On asa 81 mg. Continue for now (was off for 6 days during hospitalization).  Continue home lipitor 40 mg. LDL 47, A1c 10.9. Mgmt of DM per primary team.  No afib during hospitalization. Recommend 30 day event monitor at VT and close follow up with Cardiology. Consider LOOP recorder pending results of heart monitor.  Goal blood pressure normotensive.  Dc'd Keppra as may have contributed to agitation. This was started empirically. No seizures or seizure like activity. EEG normal.  Repeat CT brain with mastoiditis and sinusitis.  She had bilateral mastoid effusions on MRI as well.  Suspect this was likely source of meningitis given preceding ear pain/symptoms, also had decreased hearing.  Appears no ENT coverage at our facility.  She is already on antibiotics.  Discussed with ID.  Continue antibiotics as per ID recs.  Seroquel increased to 50 mg nightly. Would use PRN antipsychotics if needed. Avoid benzos.  PT/OT/ST. Will need placement, possible ARU.  Treatment of underlying medical conditions as per primary

## 2024-06-09 LAB
ANION GAP SERPL CALCULATED.3IONS-SCNC: 10 MMOL/L (ref 9–17)
ANION GAP SERPL CALCULATED.3IONS-SCNC: 14 MMOL/L (ref 9–17)
BUN SERPL-MCNC: 8 MG/DL (ref 8–23)
BUN SERPL-MCNC: 8 MG/DL (ref 8–23)
CA-I BLD-SCNC: 1.18 MMOL/L (ref 1.1–1.33)
CALCIUM SERPL-MCNC: 8.4 MG/DL (ref 8.6–10.4)
CALCIUM SERPL-MCNC: 8.5 MG/DL (ref 8.6–10.4)
CHLORIDE SERPL-SCNC: 111 MMOL/L (ref 98–107)
CHLORIDE SERPL-SCNC: 114 MMOL/L (ref 98–107)
CO2 SERPL-SCNC: 20 MMOL/L (ref 20–31)
CO2 SERPL-SCNC: 23 MMOL/L (ref 20–31)
CREAT SERPL-MCNC: 0.7 MG/DL (ref 0.5–0.9)
CREAT SERPL-MCNC: 0.7 MG/DL (ref 0.5–0.9)
DATE LAST DOSE: NORMAL
GFR, ESTIMATED: >90 ML/MIN/1.73M2
GFR, ESTIMATED: >90 ML/MIN/1.73M2
GLUCOSE BLD-MCNC: 148 MG/DL (ref 65–105)
GLUCOSE BLD-MCNC: 154 MG/DL (ref 65–105)
GLUCOSE BLD-MCNC: 167 MG/DL (ref 65–105)
GLUCOSE BLD-MCNC: 199 MG/DL (ref 65–105)
GLUCOSE BLD-MCNC: 201 MG/DL (ref 65–105)
GLUCOSE BLD-MCNC: 208 MG/DL (ref 65–105)
GLUCOSE BLD-MCNC: 289 MG/DL (ref 65–105)
GLUCOSE SERPL-MCNC: 168 MG/DL (ref 70–99)
GLUCOSE SERPL-MCNC: 187 MG/DL (ref 70–99)
MAGNESIUM SERPL-MCNC: 1.5 MG/DL (ref 1.6–2.6)
PHOSPHATE SERPL-MCNC: 3.1 MG/DL (ref 2.6–4.5)
POTASSIUM SERPL-SCNC: 3.3 MMOL/L (ref 3.7–5.3)
POTASSIUM SERPL-SCNC: 3.6 MMOL/L (ref 3.7–5.3)
SODIUM SERPL-SCNC: 144 MMOL/L (ref 135–144)
SODIUM SERPL-SCNC: 148 MMOL/L (ref 135–144)
TME LAST DOSE: 45 H
VANCOMYCIN DOSE: NORMAL MG
VANCOMYCIN SERPL-MCNC: 31.2 UG/ML

## 2024-06-09 PROCEDURE — 2060000000 HC ICU INTERMEDIATE R&B

## 2024-06-09 PROCEDURE — 99232 SBSQ HOSP IP/OBS MODERATE 35: CPT | Performed by: INTERNAL MEDICINE

## 2024-06-09 PROCEDURE — 84100 ASSAY OF PHOSPHORUS: CPT

## 2024-06-09 PROCEDURE — 2580000003 HC RX 258: Performed by: INTERNAL MEDICINE

## 2024-06-09 PROCEDURE — C9113 INJ PANTOPRAZOLE SODIUM, VIA: HCPCS | Performed by: INTERNAL MEDICINE

## 2024-06-09 PROCEDURE — A4216 STERILE WATER/SALINE, 10 ML: HCPCS | Performed by: INTERNAL MEDICINE

## 2024-06-09 PROCEDURE — 2580000003 HC RX 258

## 2024-06-09 PROCEDURE — 80202 ASSAY OF VANCOMYCIN: CPT

## 2024-06-09 PROCEDURE — 6370000000 HC RX 637 (ALT 250 FOR IP)

## 2024-06-09 PROCEDURE — 83735 ASSAY OF MAGNESIUM: CPT

## 2024-06-09 PROCEDURE — 2500000003 HC RX 250 WO HCPCS

## 2024-06-09 PROCEDURE — 2500000003 HC RX 250 WO HCPCS: Performed by: INTERNAL MEDICINE

## 2024-06-09 PROCEDURE — 36415 COLL VENOUS BLD VENIPUNCTURE: CPT

## 2024-06-09 PROCEDURE — 6360000002 HC RX W HCPCS: Performed by: NURSE PRACTITIONER

## 2024-06-09 PROCEDURE — 6370000000 HC RX 637 (ALT 250 FOR IP): Performed by: INTERNAL MEDICINE

## 2024-06-09 PROCEDURE — 6360000002 HC RX W HCPCS

## 2024-06-09 PROCEDURE — 80048 BASIC METABOLIC PNL TOTAL CA: CPT

## 2024-06-09 PROCEDURE — 6360000002 HC RX W HCPCS: Performed by: INTERNAL MEDICINE

## 2024-06-09 PROCEDURE — 6370000000 HC RX 637 (ALT 250 FOR IP): Performed by: NURSE PRACTITIONER

## 2024-06-09 PROCEDURE — 99232 SBSQ HOSP IP/OBS MODERATE 35: CPT | Performed by: PSYCHIATRY & NEUROLOGY

## 2024-06-09 PROCEDURE — 82330 ASSAY OF CALCIUM: CPT

## 2024-06-09 RX ADMIN — ASPIRIN 81 MG: 81 TABLET, COATED ORAL at 09:07

## 2024-06-09 RX ADMIN — ROPINIROLE HYDROCHLORIDE 2 MG: 2 TABLET, FILM COATED ORAL at 09:07

## 2024-06-09 RX ADMIN — INSULIN LISPRO 2 UNITS: 100 INJECTION, SOLUTION INTRAVENOUS; SUBCUTANEOUS at 20:50

## 2024-06-09 RX ADMIN — MAGNESIUM SULFATE HEPTAHYDRATE 2000 MG: 40 INJECTION, SOLUTION INTRAVENOUS at 23:25

## 2024-06-09 RX ADMIN — HYDRALAZINE HYDROCHLORIDE 50 MG: 50 TABLET ORAL at 05:20

## 2024-06-09 RX ADMIN — ANTI-FUNGAL POWDER MICONAZOLE NITRATE TALC FREE: 1.42 POWDER TOPICAL at 20:45

## 2024-06-09 RX ADMIN — ROPINIROLE HYDROCHLORIDE 2 MG: 2 TABLET, FILM COATED ORAL at 20:23

## 2024-06-09 RX ADMIN — PANTOPRAZOLE SODIUM 40 MG: 40 INJECTION, POWDER, FOR SOLUTION INTRAVENOUS at 09:07

## 2024-06-09 RX ADMIN — BUSPIRONE HYDROCHLORIDE 10 MG: 10 TABLET ORAL at 14:49

## 2024-06-09 RX ADMIN — LATANOPROST 1 DROP: 50 SOLUTION OPHTHALMIC at 20:45

## 2024-06-09 RX ADMIN — SODIUM CHLORIDE, PRESERVATIVE FREE 10 ML: 5 INJECTION INTRAVENOUS at 09:07

## 2024-06-09 RX ADMIN — CEFTRIAXONE SODIUM 2000 MG: 2 INJECTION, POWDER, FOR SOLUTION INTRAMUSCULAR; INTRAVENOUS at 16:05

## 2024-06-09 RX ADMIN — POTASSIUM CHLORIDE 40 MEQ: 1500 TABLET, EXTENDED RELEASE ORAL at 23:25

## 2024-06-09 RX ADMIN — SODIUM CHLORIDE, PRESERVATIVE FREE 10 ML: 5 INJECTION INTRAVENOUS at 20:44

## 2024-06-09 RX ADMIN — HYDRALAZINE HYDROCHLORIDE 50 MG: 50 TABLET ORAL at 20:24

## 2024-06-09 RX ADMIN — ENOXAPARIN SODIUM 30 MG: 100 INJECTION SUBCUTANEOUS at 20:24

## 2024-06-09 RX ADMIN — HYDRALAZINE HYDROCHLORIDE 50 MG: 50 TABLET ORAL at 14:49

## 2024-06-09 RX ADMIN — BUSPIRONE HYDROCHLORIDE 10 MG: 10 TABLET ORAL at 09:07

## 2024-06-09 RX ADMIN — ACETAMINOPHEN 650 MG: 325 TABLET ORAL at 02:52

## 2024-06-09 RX ADMIN — ACETAMINOPHEN 650 MG: 325 TABLET ORAL at 14:50

## 2024-06-09 RX ADMIN — BUSPIRONE HYDROCHLORIDE 10 MG: 10 TABLET ORAL at 20:24

## 2024-06-09 RX ADMIN — INSULIN LISPRO 4 UNITS: 100 INJECTION, SOLUTION INTRAVENOUS; SUBCUTANEOUS at 16:02

## 2024-06-09 RX ADMIN — ATORVASTATIN CALCIUM 40 MG: 40 TABLET, FILM COATED ORAL at 20:24

## 2024-06-09 RX ADMIN — POTASSIUM BICARBONATE 20 MEQ: 782 TABLET, EFFERVESCENT ORAL at 09:39

## 2024-06-09 RX ADMIN — LISINOPRIL 40 MG: 20 TABLET ORAL at 09:07

## 2024-06-09 RX ADMIN — HYDRALAZINE HYDROCHLORIDE 10 MG: 20 INJECTION, SOLUTION INTRAMUSCULAR; INTRAVENOUS at 12:32

## 2024-06-09 RX ADMIN — ACETAMINOPHEN 650 MG: 325 TABLET ORAL at 09:03

## 2024-06-09 RX ADMIN — INSULIN GLARGINE 20 UNITS: 100 INJECTION, SOLUTION SUBCUTANEOUS at 09:06

## 2024-06-09 RX ADMIN — METOPROLOL 100 MG: 100 TABLET ORAL at 09:07

## 2024-06-09 RX ADMIN — ACETAMINOPHEN 650 MG: 325 TABLET ORAL at 20:23

## 2024-06-09 RX ADMIN — QUETIAPINE FUMARATE 50 MG: 50 TABLET ORAL at 20:24

## 2024-06-09 RX ADMIN — VANCOMYCIN HYDROCHLORIDE 1250 MG: 1.25 INJECTION, POWDER, LYOPHILIZED, FOR SOLUTION INTRAVENOUS at 00:45

## 2024-06-09 RX ADMIN — METOPROLOL 100 MG: 100 TABLET ORAL at 20:24

## 2024-06-09 RX ADMIN — ANTI-FUNGAL POWDER MICONAZOLE NITRATE TALC FREE: 1.42 POWDER TOPICAL at 09:12

## 2024-06-09 RX ADMIN — ENOXAPARIN SODIUM 30 MG: 100 INJECTION SUBCUTANEOUS at 09:06

## 2024-06-09 RX ADMIN — CEFTRIAXONE SODIUM 2000 MG: 2 INJECTION, POWDER, FOR SOLUTION INTRAMUSCULAR; INTRAVENOUS at 04:39

## 2024-06-09 ASSESSMENT — PAIN SCALES - GENERAL
PAINLEVEL_OUTOF10: 9
PAINLEVEL_OUTOF10: 8
PAINLEVEL_OUTOF10: 8
PAINLEVEL_OUTOF10: 0
PAINLEVEL_OUTOF10: 4
PAINLEVEL_OUTOF10: 0
PAINLEVEL_OUTOF10: 5

## 2024-06-09 ASSESSMENT — PAIN DESCRIPTION - LOCATION
LOCATION: HEAD
LOCATION: EAR;HEAD
LOCATION: EAR;HEAD
LOCATION: HEAD
LOCATION: EAR;HEAD

## 2024-06-09 ASSESSMENT — PAIN DESCRIPTION - PAIN TYPE
TYPE: ACUTE PAIN
TYPE: ACUTE PAIN

## 2024-06-09 ASSESSMENT — PAIN - FUNCTIONAL ASSESSMENT
PAIN_FUNCTIONAL_ASSESSMENT: ACTIVITIES ARE NOT PREVENTED
PAIN_FUNCTIONAL_ASSESSMENT: ACTIVITIES ARE NOT PREVENTED

## 2024-06-09 ASSESSMENT — PAIN DESCRIPTION - DESCRIPTORS
DESCRIPTORS: ACHING

## 2024-06-09 ASSESSMENT — PAIN DESCRIPTION - ONSET
ONSET: ON-GOING
ONSET: ON-GOING

## 2024-06-09 ASSESSMENT — PAIN DESCRIPTION - FREQUENCY
FREQUENCY: CONTINUOUS
FREQUENCY: CONTINUOUS

## 2024-06-09 ASSESSMENT — PAIN DESCRIPTION - ORIENTATION
ORIENTATION: LEFT
ORIENTATION: LEFT

## 2024-06-09 NOTE — PROGRESS NOTES
[facility] -   Infectious diseases evaluation  admission date 5/26/2024    reason for consultation:   High fever, unknown etiology sepsis    Impression :   Current:  Haemophilus influenza meningitis/ bacteremia  Fever/leukocytosis  Possible mastoiditis and sinusitis on CT head  Acute /subacute infarct in the left posterior frontal lobe   Sepsis   Colitis  Diabetes mellitus  Chronic kidney disease  Hyperlipidemia  Hypertension  GERD  [place]      Discussion:  Status post lumbar puncture 5/29, cloudy fluid, 280 WBC, meningitis panel was positive for haemophilus influenza.      HENCE:   Continue Ceftriaxone 2 gm IV every 12 hours   D/c IV vancomycin  Repeat blood cultures from y no growth to date  Respiratory panel with COVID-19 - 6/6/2024  UA 6/6 showed 3-5 WBC, trace leukocyte esterase, many yeast  Chest x-ray from 6/6 showed no acute process  Procalcitonin level 0.18 on 6/6  No obvious vegetations per 2 d echo.   EMANUEL 6/7/2024 showed no vegetations  Follow CBC and renal function closely.  No growth on blood cultures from 5/28/2024.  Consider ENT evaluation  Discussed with nursing staff  Supportive care.        Infection Control Recommendations   [place] Precautions      Antimicrobial Stewardship Recommendations   Simplification of therapy  Targeted therapy      History of Present Illness:   Initial history:  Kavya De Santiago is a 64 y.o.-year-old female was brought to the hospital for altered mental status associated with nausea, vomiting and diarrhea.  The patient is intubated, unable to provide history that was obtained from chart review and  at the bedside had upper respiratory symptoms, congestion, headache and rhinorrhea for 1 week associated with dry cough and decreased appetite.  The patient was found on the ground on the day of admission, EMS called.  The patient was intubated at the ER.  The patient was hypothermic initially then has been running     metoprolol tartrate  100 mg Oral BID    lisinopril  40 mg Oral Daily    busPIRone  10 mg Per NG tube TID    insulin glargine  20 Units SubCUTAneous Daily    miconazole   Topical BID    enoxaparin  30 mg SubCUTAneous BID    rOPINIRole  2 mg Oral BID    pantoprazole (PROTONIX) 40 mg in sodium chloride (PF) 0.9 % 10 mL injection  40 mg IntraVENous Daily    insulin lispro  0-8 Units SubCUTAneous Q4H    cefTRIAXone (ROCEPHIN) IV  2,000 mg IntraVENous Q12H    aspirin  81 mg Oral Daily    atorvastatin  40 mg Oral Nightly    latanoprost  1 drop Both Eyes Nightly    [Held by provider] venlafaxine  75 mg Oral Daily    sodium chloride flush  5-40 mL IntraVENous 2 times per day    sodium chloride  500 mL IntraVENous Once       Social History:     Social History     Socioeconomic History    Marital status:      Spouse name: Not on file    Number of children: Not on file    Years of education: Not on file    Highest education level: Not on file   Occupational History    Not on file   Tobacco Use    Smoking status: Never    Smokeless tobacco: Never   Vaping Use    Vaping Use: Never used   Substance and Sexual Activity    Alcohol use: Yes     Alcohol/week: 0.0 standard drinks of alcohol     Comment: rarely/ 4 times a year    Drug use: Never    Sexual activity: Yes   Other Topics Concern    Not on file   Social History Narrative    Not on file     Social Determinants of Health     Financial Resource Strain: Low Risk  (11/14/2023)    Overall Financial Resource Strain (CARDIA)     Difficulty of Paying Living Expenses: Not hard at all   Food Insecurity: No Food Insecurity (6/4/2024)    Hunger Vital Sign     Worried About Running Out of Food in the Last Year: Never true     Ran Out of Food in the Last Year: Never true   Transportation Needs: No Transportation Needs (6/4/2024)    PRAPARE - Transportation     Lack of Transportation (Medical): No     Lack of Transportation (Non-Medical): No   Physical Activity: Unknown

## 2024-06-09 NOTE — PLAN OF CARE
Problem: Safety - Adult  Goal: Free from fall injury  6/9/2024 0224 by Karina Calderon RN  Outcome: Progressing  Note: Patient weak.  Bed alarm on.  Assisted to BSC with one assist.  Alert and oriented.  6/9/2024 0216 by Marva Brunson  Outcome: Progressing  Note: Patients bed alarm is on, bed is in the lowest position, and call light is within reach.   6/8/2024 1749 by Rafia Miller RN  Outcome: Progressing     Problem: Discharge Planning  Goal: Discharge to home or other facility with appropriate resources  6/9/2024 0224 by Karina Calderon RN  Outcome: Progressing  6/9/2024 0216 by Marva Brunson  Outcome: Progressing  6/8/2024 1749 by Rafia Miller RN  Outcome: Progressing     Problem: Pain  Goal: Verbalizes/displays adequate comfort level or baseline comfort level  6/9/2024 0224 by Karina Calderon RN  Outcome: Progressing  Note: Denies pain.Positioned comfortably.  6/9/2024 0216 by Marva Brunson  Outcome: Progressing  6/8/2024 1749 by Rafia Miller RN  Outcome: Progressing     Problem: Skin/Tissue Integrity  Goal: Absence of new skin breakdown  Description: 1.  Monitor for areas of redness and/or skin breakdown  2.  Assess vascular access sites hourly  3.  Every 4-6 hours minimum:  Change oxygen saturation probe site  4.  Every 4-6 hours:  If on nasal continuous positive airway pressure, respiratory therapy assess nares and determine need for appliance change or resting period.  6/9/2024 0224 by Karina Calderon RN  Outcome: Progressing  Note: Coccyx slightly reddened.  Assisted with turning and repositioning.    6/9/2024 0216 by Marva Brunson  Outcome: Progressing  Note: Q2 turning as prevention for worsening skin breakdown   6/8/2024 1749 by Rafia Miller RN  Outcome: Progressing     Problem: ABCDS Injury Assessment  Goal: Absence of physical injury  6/9/2024 0224 by Karina Calderon RN  Outcome: Progressing  6/9/2024 0216 by [place]

## 2024-06-09 NOTE — CARE COORDINATION
ONGOING DISCHARGE PLANNING NOTE:    Pt. Is Alert & oriented.     Writer reviewed LSW notes, and discharge plan is  to DC to ARU.      Anticipate Bed available on Monday 6/10/24.     Pt wanted to speak to HELP in regards, to not having an Income at this time. Left VM for HELP to call roberta.     Will continue to follow for additional discharge needs.    Electronically signed by Indigo Rios RN on 6/9/2024 at 3:00 PM

## 2024-06-09 NOTE — PROGRESS NOTES
Vancomycin Dosing by Pharmacy - Daily Note   Vancomycin Therapy Day:  15  Indication: Meningitis /Bacteremia/Mastoiditis    Allergies:  Codeine   Actual Weight:    Wt Readings from Last 1 Encounters:   06/08/24 102.7 kg (226 lb 6.6 oz)       Labs/Ancillary Data  Estimated Creatinine Clearance: 92 mL/min (based on SCr of 0.7 mg/dL).  Recent Labs     06/07/24  0438 06/08/24  0439 06/09/24  0501   CREATININE 0.7 0.6 0.7   BUN 12 8 8   WBC 9.4 12.2*  --      Procalcitonin   Date Value Ref Range Status   06/06/2024 0.18 (H) <0.09 ng/mL Final     Comment:           Suspected Sepsis:  <0.50 ng/mL     Low likelihood of sepsis.  0.50-2.00 ng/mL     Increased likelihood of sepsis. Antibiotics encouraged.  >2.00 ng/mL     High risk of sepsis/shock. Antibiotics strongly encouraged.        Suspected Lower Resp Tract Infections:  <0.24 ng/mL     Low likelihood of bacterial infection.  >0.24 ng/mL     Increased likelihood of bacterial infection. Antibiotics encouraged.        With successful antibiotic therapy, PCT levels should decrease rapidly. (Half-life of 24 to   36 hours.)        Procalcitonin values from samples collected within the first 6 hours of systemic infection   may still be low. Retesting may be indicated.  Values from day 1 and day 4 can be entered into the Change in Procalcitonin Calculator   (www."[employer]urPureVideo [email or web address].[employer]) to determine the patient's Mortality Risk Prognosis        In healthy neonates, plasma Procalcitonin (PCT) concentrations increase gradually after   birth, reaching peak values at about 24 hours of age then decrease to normal values below   0.5 ng/mL by 48-72 hours of age.         Intake/Output Summary (Last 24 hours) at 6/9/2024 0618  Last data filed at 6/9/2024 0439  Gross per 24 hour   Intake 724 ml   Output 1750 ml   Net -1026 ml     Temp: 98.9    Culture Date / Source  /  Results  See micro  Recent vancomycin administrations                     vancomycin (VANCOCIN) 1,250 mg in sodium

## 2024-06-09 NOTE — PROGRESS NOTES
HISTORY: cough, sob, hypoxia Additional Contrast?->1 FINDINGS: Respiratory motion mildly degrades overall evaluation Pulmonary Arteries: Pulmonary arteries are adequately opacified for evaluation.  No evidence of intraluminal filling defect to suggest pulmonary embolism.  Main pulmonary artery is prominent and could indicate underlying pulmonary arterial hypertension. Mediastinum: No evidence of mediastinal lymphadenopathy.  The heart and pericardium demonstrate no acute abnormality.  There is no acute abnormality of the thoracic aorta.  Aberrant right subclavian artery.  Endotracheal tube is above the mason.  Enteric tube within the esophagus with distal aspect below the hemidiaphragm within the stomach with tip not imaged on this exam. Lungs/pleura: Posterior dependent atelectasis or infiltrates.  No pneumothorax or significant pleural effusion.  Central airways are patent. No suspicious lung nodule or mass. Upper Abdomen: Limited visualization upper abdomen demonstrates no acute findings.  Hepatic steatosis Soft Tissues/Bones: Spinal degenerative changes with no acute findings.     1. No evidence of pulmonary embolism. 2. Posterior dependent atelectasis or infiltrates. 3. Prominent main pulmonary artery which could indicate underlying pulmonary arterial hypertension. 4. Hepatic steatosis.     CT ABDOMEN PELVIS W IV CONTRAST Additional Contrast? None    Result Date: 5/26/2024  EXAMINATION: CT OF THE ABDOMEN AND PELVIS WITH CONTRAST 5/26/2024 8:49 am TECHNIQUE: CT of the abdomen and pelvis was performed with the administration of intravenous contrast. Multiplanar reformatted images are provided for review. Automated exposure control, iterative reconstruction, and/or weight based adjustment of the mA/kV was utilized to reduce the radiation dose to as low as reasonably achievable. COMPARISON: None. HISTORY: ORDERING SYSTEM PROVIDED HISTORY: abdominal pain, vomtiing, diarrha TECHNOLOGIST PROVIDED HISTORY: abdominal  with the resident. I have reviewed the key elements of all parts of the encounter with the resident.  I agree with the assessment, plan and orders as documented by the resident.    Principal Problem:    Severe diarrhea  Active Problems:    Acute encephalopathy    Pancolitis (HCC)    Fever    Acute respiratory failure (HCC)    Colitis    Haemophilus influenzae meningitis    Bacteremia    Sepsis due to Haemophilus influenzae with acute respiratory failure without septic shock (HCC)    Anemia    Elevated LFTs    Unresponsive state    Meningoencephalitis    History of TIA (transient ischemic attack)    Cerebrovascular accident (CVA) (HCC)  Resolved Problems:    * No resolved hospital problems. *    No fevers for almost 48 hours now.  Repeat blood culture from 6/6 no growth  Vancomycin has been discontinued by ID  Patient remains on IV Rocephin to be continued on discharge to ARU per ID recommendations.  PT OT discharge planning  Will need to be discharged on 30-day Holter monitor due to recent stroke.    Electronically signed by Ольга Peguero MD

## 2024-06-09 NOTE — PROGRESS NOTES
[facility] Neurology   IN-PATIENT SERVICE      NEUROLOGY PROGRESS  NOTE            Date:   6/9/2024  Patient name:  Kavya De Santiago  Date of admission:  5/26/2024  YOB: 1960      Interval History:     6/9: Remains neurologically stable.  No further episodes of confusion.  Afebrile.    6/8: Underwent EMANUEL yesterday.  No acute findings.  Mentation continues to improve.  More alert and attentive today.   at bedside agrees.  No new neurological complaints.  Downgraded from ICU.    6/7: Mentation significantly improved today.  Was able to sleep last night.  Precedex has now been completely weaned off.  She is alert and oriented x 3.  No new neurological symptoms.    6/6: Overnight, patient became very agitated and restless, was trying to get out of bed.  She needed to be started on Precedex which was eventually maxed out.  At time of evaluation, she is very lethargic, difficult to awaken.   at bedside.  Repeat CT brain done today.    6/5: Has been having some waxing and waning mentation, continues to say that she is supposed to go to [facility] this weekend.  Intermittently restless/agitated.  No episodes of staring or seizure-like activity.    6/4: Had MRI brain done yesterday.  Continues to have improvement in mentation.  No new neurological deficits.  Working with PT/OT/ST.    6/3: No acute events.  Was extubated yesterday.  This morning, per nursing staff and  at bedside, mentation improved.  She is able to answer questions although slow to respond.  No seizures or seizure-like activity.    History of Present Illness:     Per my partner:    Briefly, this is a  64 y.o. female with hx of HTN, DM, DIEGO and CKD was admitted on 5/26/2024 with altered mentation.  Initial history obtained from EMS report and patient's .  EMS report states that they were called to the patient's house for c/o nausea and vomiting and upon evaluation by EMS noted to be with abnormal glucose  the orbits demonstrate no acute abnormality.    SINUSES: The visualized paranasal sinuses and mastoid air cells demonstrate  no acute abnormality.    BONES/SOFT TISSUES: The bone marrow signal intensity appears normal. The soft  tissues demonstrate no acute abnormality.    Impression  Chronic microvascular disease without acute intracranial abnormality.        Results for orders placed during the hospital encounter of 05/26/24    CT HEAD WO CONTRAST    Narrative  EXAMINATION:  CT OF THE HEAD WITHOUT CONTRAST  5/26/2024 9:16 am    TECHNIQUE:  CT of the head was performed without the administration of intravenous  contrast. Automated exposure control, iterative reconstruction, and/or weight  based adjustment of the mA/kV was utilized to reduce the radiation dose to as  low as reasonably achievable.    COMPARISON:  None.    HISTORY:  ORDERING SYSTEM PROVIDED HISTORY: ams, vomiting,  TECHNOLOGIST PROVIDED HISTORY:  ams, vomiting,    Decision Support Exception - unselect if not a suspected or confirmed  emergency medical condition->Emergency Medical Condition (MA)  Reason for Exam: Found down, LT pupil more reactive than RT. Nausea,  vomiting.  Intubated in route    FINDINGS:  BRAIN/VENTRICLES: There is no acute intracranial hemorrhage, mass effect or  midline shift.  No abnormal extra-axial fluid collection.  The gray-white  differentiation is maintained without evidence of an acute infarct.  There is  no evidence of hydrocephalus.    ORBITS: The visualized portion of the orbits demonstrate no acute abnormality.    SINUSES: The visualized paranasal sinuses and mastoid air cells demonstrate  no acute abnormality.    SOFT TISSUES/SKULL:  No acute abnormality of the visualized skull or soft  tissues.    Impression  No acute intracranial abnormality.          I personally reviewed all of the above medications, clinical laboratory, imaging and other diagnostic tests.         Impression:      Acute encephalopathy, in setting

## 2024-06-09 NOTE — PLAN OF CARE
Problem: Safety - Adult  Goal: Free from fall injury  6/9/2024 1054 by Kelsy Conner RN  Outcome: Progressing  6/9/2024 0224 by Karina Calderon RN  Outcome: Progressing  Note: Patient weak.  Bed alarm on.  Assisted to BSC with one assist.  Alert and oriented.  6/9/2024 0216 by Marva Brunson  Outcome: Progressing  Note: Patients bed alarm is on, bed is in the lowest position, and call light is within reach.      Problem: Discharge Planning  Goal: Discharge to home or other facility with appropriate resources  6/9/2024 1054 by Kelsy Conner RN  Outcome: Progressing  6/9/2024 0224 by Karina Calderon RN  Outcome: Progressing  6/9/2024 0216 by Marva Brunson  Outcome: Progressing     Problem: Pain  Goal: Verbalizes/displays adequate comfort level or baseline comfort level  6/9/2024 1054 by Kelsy Conner RN  Outcome: Progressing  6/9/2024 0224 by Karina Calderon RN  Outcome: Progressing  Note: Denies pain.Positioned comfortably.  6/9/2024 0216 by Marva Brunson  Outcome: Progressing     Problem: Skin/Tissue Integrity  Goal: Absence of new skin breakdown  Description: 1.  Monitor for areas of redness and/or skin breakdown  2.  Assess vascular access sites hourly  3.  Every 4-6 hours minimum:  Change oxygen saturation probe site  4.  Every 4-6 hours:  If on nasal continuous positive airway pressure, respiratory therapy assess nares and determine need for appliance change or resting period.  6/9/2024 1054 by Kelsy Conner RN  Outcome: Progressing  6/9/2024 0224 by Karina Calderon RN  Outcome: Progressing  Note: Coccyx slightly reddened.  Assisted with turning and repositioning.    6/9/2024 0216 by Marva Brunson  Outcome: Progressing  Note: Q2 turning as prevention for worsening skin breakdown      Problem: ABCDS Injury Assessment  Goal: Absence of physical injury  6/9/2024 1054 by Kelsy Conner RN  Outcome: Progressing  6/9/2024 0224 by Kvng  Marva Sandoval  Outcome: Progressing  Goal: Maintains adequate nutritional intake  6/9/2024 1054 by Kelsy Conner RN  Outcome: Progressing  6/9/2024 0224 by Karina Calderon RN  Outcome: Progressing  6/9/2024 0216 by Marva Brunson  Outcome: Progressing     Problem: Infection - Adult  Goal: Absence of infection at discharge  6/9/2024 1054 by Kelsy Conner RN  Outcome: Progressing  6/9/2024 0224 by Karina Calderon RN  Outcome: Progressing  Note: Afebrile.  Antibiotics continue as ordered.  6/9/2024 0216 by Marva Brunson  Outcome: Progressing  Goal: Absence of infection during hospitalization  6/9/2024 1054 by Kelsy Conner RN  Outcome: Progressing  6/9/2024 0224 by Karina Calderon RN  Outcome: Progressing  6/9/2024 0216 by Marva Brunson  Outcome: Progressing  Goal: Absence of fever/infection during anticipated neutropenic period  6/9/2024 1054 by Kelsy Conner RN  Outcome: Progressing  6/9/2024 0224 by Karina Calderon RN  Outcome: Progressing  6/9/2024 0216 by Marva Brunson  Outcome: Progressing     Problem: Hematologic - Adult  Goal: Maintains hematologic stability  6/9/2024 1054 by Kelsy Conner RN  Outcome: Progressing  6/9/2024 0224 by Karina Calderon RN  Outcome: Progressing  6/9/2024 0216 by Marva Brunson  Outcome: Progressing     Problem: Chronic Conditions and Co-morbidities  Goal: Patient's chronic conditions and co-morbidity symptoms are monitored and maintained or improved  6/9/2024 1054 by Kelsy Conner RN  Outcome: Progressing  6/9/2024 0224 by Karina Calderon RN  Outcome: Progressing  6/9/2024 0216 by Marva Brunson  Outcome: Progressing     Problem: Nutrition Deficit:  Goal: Optimize nutritional status  6/9/2024 1054 by Kelsy Conner RN  Outcome: Progressing  6/9/2024 0224 by Karina Calderon RN  Outcome: Progressing  6/9/2024 0216 by Marva Brunson  Outcome: Progressing

## 2024-06-10 ENCOUNTER — APPOINTMENT (OUTPATIENT)
Age: 64
End: 2024-06-10
Attending: PSYCHIATRY & NEUROLOGY
Payer: COMMERCIAL

## 2024-06-10 LAB
ANION GAP SERPL CALCULATED.3IONS-SCNC: 12 MMOL/L (ref 9–17)
BASOPHILS # BLD: 0.1 K/UL (ref 0–0.2)
BASOPHILS NFR BLD: 2 % (ref 0–2)
BUN SERPL-MCNC: 6 MG/DL (ref 8–23)
CALCIUM SERPL-MCNC: 8.6 MG/DL (ref 8.6–10.4)
CHLORIDE SERPL-SCNC: 111 MMOL/L (ref 98–107)
CO2 SERPL-SCNC: 24 MMOL/L (ref 20–31)
CREAT SERPL-MCNC: 0.8 MG/DL (ref 0.5–0.9)
EKG ATRIAL RATE: 60 BPM
EKG P AXIS: 47 DEGREES
EKG P-R INTERVAL: 148 MS
EKG Q-T INTERVAL: 494 MS
EKG QRS DURATION: 70 MS
EKG QTC CALCULATION (BAZETT): 494 MS
EKG R AXIS: -18 DEGREES
EKG T AXIS: -12 DEGREES
EKG VENTRICULAR RATE: 60 BPM
EOSINOPHIL # BLD: 0.5 K/UL (ref 0–0.4)
EOSINOPHILS RELATIVE PERCENT: 7 % (ref 0–4)
ERYTHROCYTE [DISTWIDTH] IN BLOOD BY AUTOMATED COUNT: 15.6 % (ref 11.5–14.9)
GFR, ESTIMATED: 83 ML/MIN/1.73M2
GLUCOSE BLD-MCNC: 143 MG/DL (ref 65–105)
GLUCOSE BLD-MCNC: 172 MG/DL (ref 65–105)
GLUCOSE BLD-MCNC: 207 MG/DL (ref 65–105)
GLUCOSE BLD-MCNC: 244 MG/DL (ref 65–105)
GLUCOSE SERPL-MCNC: 169 MG/DL (ref 70–99)
HCT VFR BLD AUTO: 29.7 % (ref 36–46)
HGB BLD-MCNC: 9.3 G/DL (ref 12–16)
LYMPHOCYTES NFR BLD: 2 K/UL (ref 1–4.8)
LYMPHOCYTES RELATIVE PERCENT: 28 % (ref 24–44)
MAGNESIUM SERPL-MCNC: 2.7 MG/DL (ref 1.6–2.6)
MCH RBC QN AUTO: 27.6 PG (ref 26–34)
MCHC RBC AUTO-ENTMCNC: 31.4 G/DL (ref 31–37)
MCV RBC AUTO: 87.9 FL (ref 80–100)
MONOCYTES NFR BLD: 0.9 K/UL (ref 0.1–1.3)
MONOCYTES NFR BLD: 12 % (ref 1–7)
NEUTROPHILS NFR BLD: 51 % (ref 36–66)
NEUTS SEG NFR BLD: 3.6 K/UL (ref 1.3–9.1)
PLATELET # BLD AUTO: 435 K/UL (ref 150–450)
PMV BLD AUTO: 7.8 FL (ref 6–12)
POTASSIUM SERPL-SCNC: 3.6 MMOL/L (ref 3.7–5.3)
POTASSIUM SERPL-SCNC: 4.4 MMOL/L (ref 3.7–5.3)
RBC # BLD AUTO: 3.37 M/UL (ref 4–5.2)
SODIUM SERPL-SCNC: 147 MMOL/L (ref 135–144)
TROPONIN I SERPL HS-MCNC: 37 NG/L (ref 0–14)
WBC OTHER # BLD: 7.1 K/UL (ref 3.5–11)

## 2024-06-10 PROCEDURE — 99232 SBSQ HOSP IP/OBS MODERATE 35: CPT | Performed by: INTERNAL MEDICINE

## 2024-06-10 PROCEDURE — 97110 THERAPEUTIC EXERCISES: CPT

## 2024-06-10 PROCEDURE — 82947 ASSAY GLUCOSE BLOOD QUANT: CPT

## 2024-06-10 PROCEDURE — 2580000003 HC RX 258: Performed by: INTERNAL MEDICINE

## 2024-06-10 PROCEDURE — 83735 ASSAY OF MAGNESIUM: CPT

## 2024-06-10 PROCEDURE — 6360000002 HC RX W HCPCS

## 2024-06-10 PROCEDURE — 85025 COMPLETE CBC W/AUTO DIFF WBC: CPT

## 2024-06-10 PROCEDURE — 97530 THERAPEUTIC ACTIVITIES: CPT

## 2024-06-10 PROCEDURE — 36415 COLL VENOUS BLD VENIPUNCTURE: CPT

## 2024-06-10 PROCEDURE — 2500000003 HC RX 250 WO HCPCS

## 2024-06-10 PROCEDURE — 6370000000 HC RX 637 (ALT 250 FOR IP): Performed by: NURSE PRACTITIONER

## 2024-06-10 PROCEDURE — 99232 SBSQ HOSP IP/OBS MODERATE 35: CPT | Performed by: PHYSICAL MEDICINE & REHABILITATION

## 2024-06-10 PROCEDURE — 80048 BASIC METABOLIC PNL TOTAL CA: CPT

## 2024-06-10 PROCEDURE — A4216 STERILE WATER/SALINE, 10 ML: HCPCS | Performed by: INTERNAL MEDICINE

## 2024-06-10 PROCEDURE — C9113 INJ PANTOPRAZOLE SODIUM, VIA: HCPCS | Performed by: INTERNAL MEDICINE

## 2024-06-10 PROCEDURE — 6360000002 HC RX W HCPCS: Performed by: INTERNAL MEDICINE

## 2024-06-10 PROCEDURE — 84484 ASSAY OF TROPONIN QUANT: CPT

## 2024-06-10 PROCEDURE — 6370000000 HC RX 637 (ALT 250 FOR IP): Performed by: INTERNAL MEDICINE

## 2024-06-10 PROCEDURE — 97535 SELF CARE MNGMENT TRAINING: CPT

## 2024-06-10 PROCEDURE — 6370000000 HC RX 637 (ALT 250 FOR IP)

## 2024-06-10 PROCEDURE — 84132 ASSAY OF SERUM POTASSIUM: CPT

## 2024-06-10 PROCEDURE — 97129 THER IVNTJ 1ST 15 MIN: CPT

## 2024-06-10 PROCEDURE — 2060000000 HC ICU INTERMEDIATE R&B

## 2024-06-10 PROCEDURE — 2580000003 HC RX 258

## 2024-06-10 RX ORDER — NITROGLYCERIN 0.4 MG/1
0.4 TABLET SUBLINGUAL EVERY 5 MIN PRN
Status: CANCELLED | OUTPATIENT
Start: 2024-06-10 | End: 2024-06-10

## 2024-06-10 RX ORDER — MAGNESIUM SULFATE HEPTAHYDRATE 40 MG/ML
2000 INJECTION, SOLUTION INTRAVENOUS ONCE
Status: COMPLETED | OUTPATIENT
Start: 2024-06-10 | End: 2024-06-10

## 2024-06-10 RX ORDER — METOPROLOL TARTRATE 1 MG/ML
5 INJECTION, SOLUTION INTRAVENOUS EVERY 5 MIN PRN
Status: CANCELLED | OUTPATIENT
Start: 2024-06-10 | End: 2024-06-10

## 2024-06-10 RX ORDER — AMINOPHYLLINE 25 MG/ML
50 INJECTION, SOLUTION INTRAVENOUS PRN
Status: CANCELLED | OUTPATIENT
Start: 2024-06-10 | End: 2024-06-10

## 2024-06-10 RX ORDER — SODIUM CHLORIDE 9 MG/ML
500 INJECTION, SOLUTION INTRAVENOUS CONTINUOUS PRN
Status: CANCELLED | OUTPATIENT
Start: 2024-06-10 | End: 2024-06-10

## 2024-06-10 RX ORDER — KETOROLAC TROMETHAMINE 30 MG/ML
15 INJECTION, SOLUTION INTRAMUSCULAR; INTRAVENOUS ONCE
Status: COMPLETED | OUTPATIENT
Start: 2024-06-10 | End: 2024-06-10

## 2024-06-10 RX ORDER — MAGNESIUM SULFATE 1 G/100ML
1000 INJECTION INTRAVENOUS ONCE
Status: DISCONTINUED | OUTPATIENT
Start: 2024-06-10 | End: 2024-06-10

## 2024-06-10 RX ORDER — SENNOSIDES A AND B 8.6 MG/1
2 TABLET, FILM COATED ORAL DAILY PRN
Status: CANCELLED | OUTPATIENT
Start: 2024-06-12

## 2024-06-10 RX ORDER — REGADENOSON 0.08 MG/ML
0.4 INJECTION, SOLUTION INTRAVENOUS
Status: CANCELLED | OUTPATIENT
Start: 2024-06-10

## 2024-06-10 RX ORDER — SODIUM CHLORIDE 0.9 % (FLUSH) 0.9 %
5-40 SYRINGE (ML) INJECTION PRN
Status: CANCELLED | OUTPATIENT
Start: 2024-06-10 | End: 2024-06-10

## 2024-06-10 RX ORDER — ATROPINE SULFATE 0.1 MG/ML
0.5 INJECTION INTRAVENOUS EVERY 5 MIN PRN
Status: CANCELLED | OUTPATIENT
Start: 2024-06-10 | End: 2024-06-10

## 2024-06-10 RX ORDER — ACETAMINOPHEN 325 MG/1
650 TABLET ORAL EVERY 4 HOURS PRN
Status: CANCELLED | OUTPATIENT
Start: 2024-06-12

## 2024-06-10 RX ORDER — POLYETHYLENE GLYCOL 3350 17 G/17G
17 POWDER, FOR SOLUTION ORAL DAILY
Status: CANCELLED | OUTPATIENT
Start: 2024-06-12

## 2024-06-10 RX ORDER — BISACODYL 10 MG
10 SUPPOSITORY, RECTAL RECTAL DAILY PRN
Status: CANCELLED | OUTPATIENT
Start: 2024-06-12

## 2024-06-10 RX ORDER — ALBUTEROL SULFATE 90 UG/1
2 AEROSOL, METERED RESPIRATORY (INHALATION) PRN
Status: CANCELLED | OUTPATIENT
Start: 2024-06-10 | End: 2024-06-10

## 2024-06-10 RX ADMIN — ROPINIROLE HYDROCHLORIDE 2 MG: 2 TABLET, FILM COATED ORAL at 09:08

## 2024-06-10 RX ADMIN — BUSPIRONE HYDROCHLORIDE 10 MG: 10 TABLET ORAL at 09:08

## 2024-06-10 RX ADMIN — ENOXAPARIN SODIUM 30 MG: 100 INJECTION SUBCUTANEOUS at 21:03

## 2024-06-10 RX ADMIN — INSULIN GLARGINE 20 UNITS: 100 INJECTION, SOLUTION SUBCUTANEOUS at 09:07

## 2024-06-10 RX ADMIN — CEFTRIAXONE SODIUM 2000 MG: 2 INJECTION, POWDER, FOR SOLUTION INTRAMUSCULAR; INTRAVENOUS at 17:03

## 2024-06-10 RX ADMIN — BUSPIRONE HYDROCHLORIDE 10 MG: 10 TABLET ORAL at 21:03

## 2024-06-10 RX ADMIN — HYDRALAZINE HYDROCHLORIDE 50 MG: 50 TABLET ORAL at 14:53

## 2024-06-10 RX ADMIN — SODIUM CHLORIDE, PRESERVATIVE FREE 10 ML: 5 INJECTION INTRAVENOUS at 09:47

## 2024-06-10 RX ADMIN — ANTI-FUNGAL POWDER MICONAZOLE NITRATE TALC FREE: 1.42 POWDER TOPICAL at 09:47

## 2024-06-10 RX ADMIN — POTASSIUM BICARBONATE 40 MEQ: 782 TABLET, EFFERVESCENT ORAL at 09:07

## 2024-06-10 RX ADMIN — METOPROLOL 100 MG: 100 TABLET ORAL at 09:08

## 2024-06-10 RX ADMIN — ROPINIROLE HYDROCHLORIDE 2 MG: 2 TABLET, FILM COATED ORAL at 21:03

## 2024-06-10 RX ADMIN — ASPIRIN 81 MG: 81 TABLET, COATED ORAL at 09:08

## 2024-06-10 RX ADMIN — LISINOPRIL 40 MG: 20 TABLET ORAL at 09:08

## 2024-06-10 RX ADMIN — ATORVASTATIN CALCIUM 40 MG: 40 TABLET, FILM COATED ORAL at 21:03

## 2024-06-10 RX ADMIN — KETOROLAC TROMETHAMINE 15 MG: 30 INJECTION, SOLUTION INTRAMUSCULAR at 09:45

## 2024-06-10 RX ADMIN — METOPROLOL 100 MG: 100 TABLET ORAL at 21:03

## 2024-06-10 RX ADMIN — LATANOPROST 1 DROP: 50 SOLUTION OPHTHALMIC at 21:05

## 2024-06-10 RX ADMIN — MAGNESIUM SULFATE HEPTAHYDRATE 2000 MG: 40 INJECTION, SOLUTION INTRAVENOUS at 09:16

## 2024-06-10 RX ADMIN — ANTI-FUNGAL POWDER MICONAZOLE NITRATE TALC FREE: 1.42 POWDER TOPICAL at 21:04

## 2024-06-10 RX ADMIN — ENOXAPARIN SODIUM 30 MG: 100 INJECTION SUBCUTANEOUS at 09:07

## 2024-06-10 RX ADMIN — QUETIAPINE FUMARATE 50 MG: 50 TABLET ORAL at 21:03

## 2024-06-10 RX ADMIN — BUSPIRONE HYDROCHLORIDE 10 MG: 10 TABLET ORAL at 14:53

## 2024-06-10 RX ADMIN — CEFTRIAXONE SODIUM 2000 MG: 2 INJECTION, POWDER, FOR SOLUTION INTRAMUSCULAR; INTRAVENOUS at 03:46

## 2024-06-10 RX ADMIN — HYDRALAZINE HYDROCHLORIDE 50 MG: 50 TABLET ORAL at 21:03

## 2024-06-10 RX ADMIN — PANTOPRAZOLE SODIUM 40 MG: 40 INJECTION, POWDER, FOR SOLUTION INTRAVENOUS at 09:47

## 2024-06-10 RX ADMIN — HYDRALAZINE HYDROCHLORIDE 50 MG: 50 TABLET ORAL at 05:24

## 2024-06-10 ASSESSMENT — PAIN DESCRIPTION - DESCRIPTORS
DESCRIPTORS: ACHING
DESCRIPTORS: ACHING

## 2024-06-10 ASSESSMENT — PAIN DESCRIPTION - LOCATION
LOCATION: HEAD

## 2024-06-10 ASSESSMENT — ENCOUNTER SYMPTOMS
NAUSEA: 0
VOMITING: 0
CONSTIPATION: 0

## 2024-06-10 ASSESSMENT — PAIN SCALES - WONG BAKER: WONGBAKER_NUMERICALRESPONSE: NO HURT

## 2024-06-10 ASSESSMENT — PAIN SCALES - GENERAL
PAINLEVEL_OUTOF10: 9
PAINLEVEL_OUTOF10: 8
PAINLEVEL_OUTOF10: 9
PAINLEVEL_OUTOF10: 9
PAINLEVEL_OUTOF10: 8
PAINLEVEL_OUTOF10: 0

## 2024-06-10 ASSESSMENT — PAIN DESCRIPTION - ONSET: ONSET: ON-GOING

## 2024-06-10 ASSESSMENT — PAIN DESCRIPTION - FREQUENCY: FREQUENCY: CONTINUOUS

## 2024-06-10 ASSESSMENT — PAIN DESCRIPTION - PAIN TYPE: TYPE: ACUTE PAIN

## 2024-06-10 NOTE — PROGRESS NOTES
Occupational Therapy  [facility]   INPATIENT OCCUPATIONAL THERAPY  PROGRESS NOTE  Date: 6/10/2024  Patient Name: Kavya De Santiago       Room:   MRN: [id number]    : 1960  (64 y.o.)  Gender: female      Diagnosis: Colitis      Discharge Recommendations:  Further Occupational Therapy is recommended upon facility discharge.    OT Equipment Recommendations  Other: TBD    Restrictions/Precautions  Restrictions/Precautions  Restrictions/Precautions: Fall Risk;General Precautions  Required Braces or Orthoses?: No  Implants present? :  ( denies)    O2 Device: None (Room air)    Subjective  Subjective  Subjective: \"I feel so weak today and my headache is bad.\" pt requires mod encouragement to participate this AM. Lila SWENSON in room to pass meds and aware of headache.  Subjective  Subjective: Pt very fatigued on arrival. States she's willing to attempt but reports not being able to do much throughout treatment  Pain: pt reports 9/10 headache  Comments: Lila SWENSON oks co-tx c Surjit PTA    Objective  Orientation  Overall Orientation Status: Impaired  Orientation Level: Disoriented to time;Oriented to person;Oriented to place  Cognition  Overall Cognitive Status: Exceptions  Arousal/Alertness: Delayed responses to stimuli;Inconsistent responses to stimuli  Following Commands: Follows one step commands with increased time;Follows one step commands with repetition;Inconsistently follows commands  Attention Span: Difficulty attending to directions  Memory: Decreased short term memory;Decreased recall of recent events  Safety Judgement: Impaired;Decreased awareness of need for assistance;Decreased awareness of need for safety  Problem Solving: Impaired;Assistance required to generate solutions;Assistance required to implement solutions;Assistance required to identify errors made;Assistance required to correct errors made;Decreased awareness of errors  Insights: Decreased awareness of

## 2024-06-10 NOTE — PROGRESS NOTES
Spoke with LEELA Sharp regarding Cardiac monitor. Pt is heading to Acute rehab here at [facility] and she will call by 3:00 if she is to be released to go today.

## 2024-06-10 NOTE — PROGRESS NOTES
[facility] PULMONARY,CRITICAL CARE & SLEEP   Murrayjw Burris MD/Rosendo Khan MD  Kavya Bunn ORLY AGACNP-BC, NP-C      Lynn VILLALBA NP-C     Ayo VILLALBA NP-C                                          Pulmonary Progress Note    Patient - Kavya De Santiago   Age - 64 y.o.   - 1960  MRN - [id number]  Acct # - [id number]  Date of Admission - 2024  8:51 AM    Consulting Service/Physician:       Primary Care Physician: Shaina Hamilton MD    SUBJECTIVE:     Chief Complaint:   Chief Complaint   Patient presents with    Loss of Consciousness     Subjective:    Kavya is seen sitting up in bed on room air.  Plan for possible transfer to acute rehab.  She continues on IV Rocephin per ID.  She has been afebrile.  Sodium slightly elevated today at 147.    VITALS  BP (!) 158/72   Pulse 71   Temp 98.3 °F (36.8 °C) (Oral)   Resp 16   Ht 1.575 m (5' 2\")   Wt 102.7 kg (226 lb 6.6 oz)   SpO2 99%   BMI 41.41 kg/m²   Wt Readings from Last 3 Encounters:   24 102.7 kg (226 lb 6.6 oz)   24 91.4 kg (201 lb 9.6 oz)   10/03/23 90.1 kg (198 lb 9.6 oz)     I/O (24 Hours)    Intake/Output Summary (Last 24 hours) at 6/10/2024 1305  Last data filed at 6/10/2024 1236  Gross per 24 hour   Intake 1900 ml   Output 500 ml   Net 1400 ml     Ventilator:   Settings  FiO2 : 30 %  Insp Time (sec): 1 sec  Insp Rise Time (%): 5 %  Flow Sensitivity: 5 L/min  Exam:   Physical Exam   Constitutional: Laying in bed on room air no distress  HENT: Unremarkable  Head: Normocephalic and atraumatic.   Eyes: EOM are normal. Pupils are equal, round, and reactive to light.   Neck: Neck supple.   Cardiovascular:  Regular rate and rhythm.  Normal heart tones.  No JVD.    Pulmonary/Chest: Respirations even and unlabored, lungs clear anteriorly, on room air with pulse ox 99%   Abdominal: Soft. Bowel sounds are normal.    Musculoskeletal: Normal range of motion.   Neurological: Patient is alert and oriented  to person, place, and time.   Skin: Skin is warm and dry. No rash noted.   Extremities: Trace ankle edema  Infusions:      sodium chloride      dextrose       Meds:     Current Facility-Administered Medications:     hydrALAZINE (APRESOLINE) tablet 50 mg, 50 mg, Oral, 3 times per day, Tesfaye Winslow MD, 50 mg at 06/10/24 0524    QUEtiapine (SEROQUEL) tablet 50 mg, 50 mg, Oral, Nightly, Rosendo Huffman MD, 50 mg at 06/09/24 2024    bisacodyl (DULCOLAX) suppository 10 mg, 10 mg, Rectal, Daily PRN, Lila Gaston APRN - NP    hydrALAZINE (APRESOLINE) injection 10 mg, 10 mg, IntraVENous, Q6H PRN, Lila Gaston APRN - NP, 10 mg at 06/09/24 1232    haloperidol lactate (HALDOL) injection 2 mg, 2 mg, IntraMUSCular, Q4H PRN, Rosendo Huffman MD, 2 mg at 06/06/24 0020    potassium chloride 10 mEq/100 mL IVPB (Peripheral Line), 10 mEq, IntraVENous, PRN, Tierra Berry APRN - CNP, Last Rate: 100 mL/hr at 06/06/24 2117, 10 mEq at 06/06/24 2117    sodium chloride flush 0.9 % injection 10 mL, 10 mL, IntraVENous, PRN, Annel Cavazos MD, 10 mL at 06/03/24 2038    metoprolol (LOPRESSOR) tablet 100 mg, 100 mg, Oral, BID, Tierra Berry APRN - CNP, 100 mg at 06/10/24 0908    lisinopril (PRINIVIL;ZESTRIL) tablet 40 mg, 40 mg, Oral, Daily, Cristobal Ferrer MD, 40 mg at 06/10/24 0908    busPIRone (BUSPAR) tablet 10 mg, 10 mg, Per NG tube, TID, Bo Khan MD, 10 mg at 06/10/24 0908    sodium chloride flush 0.9 % injection 10 mL, 10 mL, IntraVENous, PRN, Annel Cavazos MD, 10 mL at 06/01/24 1958    insulin glargine (LANTUS) injection vial 20 Units, 20 Units, SubCUTAneous, Daily, Ahsan Thomas MD, 20 Units at 06/10/24 0907    miconazole (MICOTIN) 2 % powder, , Topical, BID, Samreen Alarcon MD, Given at 06/10/24 0947    enoxaparin Sodium (LOVENOX) injection 30 mg, 30 mg, SubCUTAneous, BID, Ahsan Thomas MD, 30 mg at 06/10/24 0907    rOPINIRole (REQUIP) tablet 2 mg, 2 mg, Oral, BID, Rosendo Huffman MD, 2 mg at

## 2024-06-10 NOTE — PROGRESS NOTES
06/10/24 1646   Encounter Summary   Encounter Overview/Reason Spiritual/Emotional Needs   Service Provided For Patient and family together   Last Encounter  06/10/24   Complexity of Encounter Moderate   Spiritual/Emotional needs   Type Spiritual Support   Assessment/Intervention/Outcome   Assessment Anxious;Concerns with suffering;Fearful;Impaired resilience;Impaired social interaction   Intervention Active listening;Discussed belief system/Shinto practices/zion;Discussed illness injury and it’s impact;Discussed meaning/purpose;Discussed relationship with God;Explored/Affirmed feelings, thoughts, concerns;Nurtured Hope;Prayer (assurance of)/[place];Sustaining Presence/Ministry of presence   Outcome Receptive

## 2024-06-10 NOTE — PROGRESS NOTES
[facility]  IN-PATIENT SERVICE  [facility]    PROGRESS NOTE             6/10/2024    8:47 AM    Name:   Kavya De Santiago  MRN:     [id number]     Acct:      [id number]   Room:   2117/2117-01   Day:  15  Admit Date:  5/26/2024  8:51 AM    PCP:  Shaina Hamilton MD  Code Status:  Full Code    Subjective:     C/C:   Chief Complaint   Patient presents with    Loss of Consciousness     Interval History Status: significantly improved.    Patient seen and examined at bedside  Alert and oriented X3, following commands  Has been afebrile last 48 hours  Patient had 30 beats run of V. tach while sleeping and was asymptomatic at that time.  Potassium 3.6 and magnesium 1.5 at that time.  Will replace the potassium and magnesium this morning.EKG ordered.        Brief History:     64-year-old female with history of T2DM, HTN, HLD, CKD, GERD, DIEGO, obesity who was brought to the ED for AMS, nausea, vomiting, diarrhea. Patient is intubated at the time of my visit, her  Rj is present to answer questions. Reportedly patient had some upper respiratory symptoms over the past week or so, with a dry cough, decreased appetite, rhinorrhea, congestion, headache/sinus pain. This morning at 4 AM patient began to have diarrhea,  is unsure if it was bloody or not, and she also had several episodes of vomiting. When he checked on her he found her on the ground, EMS was called, they arrived she has a GCS was 8-9, O2 saturations dropped on the way to the ED, given etomidate and Versed and placed a supraglottic airway, on arrival to the ED she was intubated. CT head showed no acute abnormality, CTA chest showed no evidence of PE, CT abdomen pelvis showed pancolitis with abnormal wall thickening of the ascending, transverse, descending colon, sigmoid colon and rectum. Lactate elevated 3.3. Magnesium 0.9, replaced. Leukocytosis with WBC 11.5. Started on piperacillin/tazobactam.  WBC, elevated RBC, elevated protein, normal glucose  -Continue Rocephin 2 g every 12 hours since 05/27  -Infectious disease on board and following, appreciate recommendations     #Mastoiditis  -CT scan reviewed showing AOM with signs of mastoiditis and sinusitis  -ID on board and following the recommendations  -Continue IV ceftriaxone, will need ENT evaluation outpatient      #New finding acute/subacute stroke on MRI 06/04/2023  -EEG done normal  -Patient Keppra twice daily currently on hold  -Blood pressure goal above 165  -EMANUEL negative  -continue Holter monitor for 30 days, will need LOOP recorder if significant events        #Colitis( improved)  -CT abdomen pelvis shows wall thickening throughout the colon concerning for colitis  -continue ceftriaxone      #Hypertension( improved)  Off Cardene drip   -Lisinopril 40 mg  -Continue metoprolol 100 mg twice daily  -Labetalol 10 mg IV as needed     #Septic Shock(Resolved)     #Acute Respiratory Failure resolved  After 7 days being on vent, patient extubated 06/02  -Currently on oxygen of 2 L     T2DM  -Continue Lantus to 20 units daily  -Medium-dose sliding scale  -Hypoglycemia protocol     DVT prophylaxis: Lovenox  GI prophylaxis: Protonix  Diet: Adult   Disposition: Acute rehab,  working on it     OT/PT/SW Consulted     Code Status: Full    Plan will be discussed with the attending, Dr Bryce Bernal MD  PGY I Transitional year Resident  6/10/2024 8:47 AM         Attending Physician Statement  I have discussed the care of Kavya De Santiago and I have examined the patient myselft and taken ros and hpi , including pertinent history and exam findings,  with the resident. I have reviewed the key elements of all parts of the encounter with the resident.  I agree with the assessment, plan and orders as documented by the resident.      Electronically signed by Sixto Wu MD

## 2024-06-10 NOTE — CARE COORDINATION
Assist needed for adequate weightshifting and MAX verbal cues to continue task.  Gait Deviations: Slow Kaelyn, Decreased step length, Decreased step height, Decreased head and trunk rotation, Shuffles  Distance: 2'    Current functional status for comprehension: Exceptions To Functional Limits    Current functional status for expression: Within Functional Limits    Current functional status for social interaction: Within Functional Limits    Current functional status for problem solving: Exceptions To Functional Limits    Current functional status for memory: Exceptions To Functional Limits    Current Deficits R/T Impairment: Impaired Functional Mobility and Decreased ADLs    Required Therapy Services:  Physical Therapy: Yes  Occupational Therapy: Yes  Speech Therapy: Yes      Additional Services:    [x] /Case Management    [] Recreational Therapy, as appropriate    [x] Nutrition Consult, as appropriate  [x] Dietary Needs/Preferences: Specialized Dietary Needs/Preferences indicated as follows: ADULT DIET; Regular; 4 carb choices (60 gm/meal)  [] Dialysis  [x] Cultural/Jew Needs/Preferences: Cultural/Jew Needs/Preferences indicated as follows: Hoahaoism  [] Special Equipment Needs  [] Special Medication Needs  [] Other information relevant to patient's care needs:    Preferred Language: English    Does the patient need or want an  to communicate with a doctor or health care staff? No    Rehab Justification:  Needs 3 hrs therapy per day or 15 hours per week:  Yes  Identified Rehab Nursing needs: Yes  Intense Interdisciplinary need:  Yes  Need for 24 hr physician supervision:  Yes  Measurable improved quality of life:  Yes  Willingness to participate:  Yes  Medical Necessity:  Yes  Patient able to tolerate care proposed:  Yes    Expected Discharge Destination/Functional Level:  Home with assist    Expected length of time to achieve level of improvement: Approximately 2  Weeks  Please Note: Estimated length of stay is based on individual condition and Acute Inpatient Rehabilitation specific needs. Length of stay may vary based upon interdisciplinary team assessment, insurance approval, and patient progression.    Expected Post Discharge Treatments: Home with possible Home Care    Acute Inpatient Rehabilitation Disclosure Statement will be provided to patient upon admission to ARU with patient's verbalization of understanding.      I have reviewed and concur with the findings and results of the pre-admission screening assessment completed by the Inpatient Rehabilitation Admissions Coordinator.

## 2024-06-10 NOTE — PROGRESS NOTES
Physical Medicine & Rehabilitation  Progress Note    6/10/2024 11:09 AM     CC: Ambulatory and ADL dysfunction due to subacute infarct left frontal lobe, encephalopathy/meningitis    64-year-old female with history of type 2 diabetes, hypertension, hyperlipidemia, CKD, GERD, DIEGO, obesity admitted with mental status change required intubation and CT abdomen showed pancolitis with abnormal wall thickening started on antibiotics patient extubated 6/2 patient treated with antibiotics EMANUEL showed no vegetation currently on Vanco and ceftriaxone blood cultures have been negative currently on Holter monitor    Internal medicine-asymptomatic V. tach replace potassium repeat EKG follow cardiology recommendations, encephalopathy due to H influenza probable isolation DC'd by infectious CSF meningitis possible H influenza  Rocephin until 5/27, mastoiditis will need ENT evaluation upper patient, on IV ceftriaxone patient Keppra twice daily on hold, colitis on ceftriaxone    Cardiology blood pressure medications adjusted replacing potassium on Seroquel 50 at at bedtime Requip and BuSpar    Neurology acute encephalopathy in setting haemophilus influenza meningitis had transient worsening of agitation suspected delirium Keppra DC'd as small acute/subacute left posterior frontal infarct.  Embolic EMANUEL negative on aspirin and Lipitor recommend 30-day Holter monitor DC Keppra as may contribute to agitation has bilateral mastoid effusions on MRI likely source of meningitis and decreased hearing ENT does not come to facility will need follow-up as outpatient ID follow on antibiotics recommend weaning Seroquel follow-up 3 to 4 weeks    ID meningitis possible mild close influenza, possible mastoiditis and sinusitis, colitis-continue ceftriaxone-questionable length    Pulmonary mental status steadily improving, hearing loss possibly secondary mastoiditis/sinusitis monitor white blood cell count        Subjective:   No complaints.  Feels  last 72 hours.    Invalid input(s): \"ALB\"     I/O (24Hr):    Intake/Output Summary (Last 24 hours) at 6/10/2024 1109  Last data filed at 6/10/2024 1021  Gross per 24 hour   Intake 1240 ml   Output 500 ml   Net 740 ml       Glu last 24 hour  Recent Labs     06/09/24 2005 06/09/24  2308 06/10/24  0342 06/10/24  0953   POCGLU 208* 167* 143* 172*       No results for input(s): \"CLARITYU\", \"COLORU\", \"PHUR\", \"SPECGRAV\", \"PROTEINU\", \"RBCUA\", \"BLOODU\", \"BACTERIA\", \"NITRU\", \"WBCUA\", \"LEUKOCYTESUR\", \"YEAST\", \"GLUCOSEU\", \"BILIRUBINUR\" in the last 72 hours.    Impression: Ms. Kavya De Santiago is a 64 y.o. female with a history of Severe diarrhea     Subacute infarct left frontal lobe-aspirin, Lipitor  Encephalopathy due to meningitis haemophilus influenza, sepsis-ceftriaxone through 6/11, Seroquel, BuSpar, Haldol as needed  Depression-BuSpar  Questional C. Difficile-oral Vanco clarify length, completed  Mastoiditis/sinusitis-on ceftriaxone, decreased hearing will need ENT evaluation  Status post acute respiratory failure extubated 6/2  Obstructive sleep apnea follows with Dr. Asencio  Nonmorbid obesity BMI 39.92  Type 2 diabetes  CKD  Hypertension/hyperlipidemia-Lipitor, lisinopril, Lopressor, hydralazine,   DIEGO-elevated liver enzymes, steatosis-further workup in progress  Anemia no overt bleeding per GI-hemoglobin 9.1, stable  Urinary retention-Bourgeois  Chronic pain-  Depression-BuSpar, noted Effexor on hold  GERD-Protonix  Questional seizure-on Keppra IV  Glaucoma-latanoprost  Restless leg-Requip        Recommendations:  1. Diagnosis: Subacute infarct left frontal lobe, encephalopathy/meningitis  2. Therapy: Has PT/OT and speech need  3. Medical  Necessity: As above  4. Support: Clarify  present notes available on discharge for support  5. Rehab recommendation: Feel patient would benefit acute inpatient rehabilitation when medically ready, was independent prior, appears to be improving, has significant cognitive

## 2024-06-10 NOTE — PROGRESS NOTES
[facility] -   Infectious diseases evaluation  admission date 5/26/2024    reason for consultation:   High fever, unknown etiology sepsis    Impression :   Current:  Haemophilus influenza meningitis/ bacteremia  Fever/leukocytosis resolved  Possible mastoiditis and sinusitis on CT head  Acute /subacute infarct in the left posterior frontal lobe   Sepsis   Colitis  Diabetes mellitus  Chronic kidney disease  Hyperlipidemia  Hypertension  GERD  [place]      Discussion:  Status post lumbar puncture 5/29, cloudy fluid, 280 WBC, meningitis panel was positive for haemophilus influenza.      HENCE:   Continue Ceftriaxone 2 gm IV every 12 hours through 7/10/2024 to finish 6 weeks course for possible l mastoiditis.  ENT evaluation as outpatient  Repeat blood cultures from 6/6/2024 no growth  Respiratory panel with COVID-19 - 6/6/2024  UA 6/6 showed 3-5 WBC, trace leukocyte esterase, many yeast  Chest x-ray from 6/6 showed no acute process  Procalcitonin level 0.18 on 6/6  No obvious vegetations per 2 d echo.   EMANUEL 6/7/2024 showed no vegetations  Follow CBC and renal function closely.  No growth on blood cultures from 5/28/2024.  Supportive care.        Infection Control Recommendations   [place] Precautions      Antimicrobial Stewardship Recommendations   Simplification of therapy  Targeted therapy      History of Present Illness:   Initial history:  Kavya De Santiago is a 64 y.o.-year-old female was brought to the hospital for altered mental status associated with nausea, vomiting and diarrhea.  The patient is intubated, unable to provide history that was obtained from chart review and  at the bedside had upper respiratory symptoms, congestion, headache and rhinorrhea for 1 week associated with dry cough and decreased appetite.  The patient was found on the ground on the day of admission, EMS called.  The patient was intubated at the ER.  The patient was hypothermic

## 2024-06-10 NOTE — PLAN OF CARE
Problem: Safety - Adult  Goal: Free from fall injury  Outcome: Progressing     Problem: Discharge Planning  Goal: Discharge to home or other facility with appropriate resources  Outcome: Progressing  Flowsheets (Taken 6/10/2024 174)  Discharge to home or other facility with appropriate resources: Identify barriers to discharge with patient and caregiver  Note: Plan to transfer to acute rehab tomorrow      Problem: Pain  Goal: Verbalizes/displays adequate comfort level or baseline comfort level  Outcome: Progressing  Flowsheets (Taken 6/10/2024 1833)  Verbalizes/displays adequate comfort level or baseline comfort level:   Encourage patient to monitor pain and request assistance   Assess pain using appropriate pain scale   Implement non-pharmacological measures as appropriate and evaluate response   Administer analgesics based on type and severity of pain and evaluate response   Consider cultural and social influences on pain and pain management     Problem: Skin/Tissue Integrity  Goal: Absence of new skin breakdown  Description: 1.  Monitor for areas of redness and/or skin breakdown  2.  Assess vascular access sites hourly  3.  Every 4-6 hours minimum:  Change oxygen saturation probe site  4.  Every 4-6 hours:  If on nasal continuous positive airway pressure, respiratory therapy assess nares and determine need for appliance change or resting period.  Outcome: Progressing     Problem: ABCDS Injury Assessment  Goal: Absence of physical injury  Outcome: Progressing     Problem: Cardiovascular - Adult  Goal: Maintains optimal cardiac output and hemodynamic stability  Outcome: Progressing     Problem: Cardiovascular - Adult  Goal: Absence of cardiac dysrhythmias or at baseline  Outcome: Progressing     Problem: Skin/Tissue Integrity - Adult  Goal: Skin integrity remains intact  Outcome: Progressing     Problem: Skin/Tissue Integrity - Adult  Goal: Incisions, wounds, or drain sites healing without S/S of  Electrolytes maintained within normal limits  Outcome: Progressing  Flowsheets (Taken 6/10/2024 4678)  Electrolytes maintained within normal limits:   Monitor labs and assess patient for signs and symptoms of electrolyte imbalances   Monitor response to electrolyte replacements, including repeat lab results as appropriate   Administer electrolyte replacement as ordered   Instruct patient on fluid and nutrition restrictions as appropriate  Note: Pt was educated on potassium and magnesium replacement.      Problem: Metabolic/Fluid and Electrolytes - Adult  Goal: Glucose maintained within prescribed range  Outcome: Progressing     Problem: Anxiety  Goal: Will report anxiety at manageable levels  Description: INTERVENTIONS:  1. Administer medication as ordered  2. Teach and rehearse alternative coping skills  3. Provide emotional support with 1:1 interaction with staff  Outcome: Progressing     Problem: Coping  Goal: Pt/Family able to verbalize concerns and demonstrate effective coping strategies  Description: INTERVENTIONS:  1. Assist patient/family to identify coping skills, available support systems and cultural and spiritual values  2. Provide emotional support, including active listening and acknowledgement of concerns of patient and caregivers  3. Reduce environmental stimuli, as able  4. Instruct patient/family in relaxation techniques, as appropriate  5. Assess for spiritual pain/suffering and initiate Spiritual Care, Psychosocial Clinical Specialist consults as needed  Outcome: Progressing     Problem: Confusion  Goal: Confusion, delirium, dementia, or psychosis is improved or at baseline  Description: INTERVENTIONS:  1. Assess for possible contributors to thought disturbance, including medications, impaired vision or hearing, underlying metabolic abnormalities, dehydration, psychiatric diagnoses, and notify attending LIP  2. [place] high risk fall precautions, as indicated  3. Provide frequent short contacts

## 2024-06-10 NOTE — PROGRESS NOTES
Patient had a 30 beat run of what appeared to be Vtach while sleeping.  Tierra Berry notified and viewed strip.  Labs ordered

## 2024-06-10 NOTE — PLAN OF CARE
discharge  Outcome: Progressing  Note: Afebrile.  IV antibiotics continue.  Goal: Absence of infection during hospitalization  Outcome: Progressing  Goal: Absence of fever/infection during anticipated neutropenic period  Outcome: Progressing     Problem: Hematologic - Adult  Goal: Maintains hematologic stability  Outcome: Progressing     Problem: Chronic Conditions and Co-morbidities  Goal: Patient's chronic conditions and co-morbidity symptoms are monitored and maintained or improved  Outcome: Progressing     Problem: Nutrition Deficit:  Goal: Optimize nutritional status  Outcome: Progressing     Problem: Neurosensory - Adult  Goal: Achieves stable or improved neurological status  Outcome: Progressing  Goal: Achieves maximal functionality and self care  Outcome: Progressing     Problem: Respiratory - Adult  Goal: Achieves optimal ventilation and oxygenation  Outcome: Progressing     Problem: Musculoskeletal - Adult  Goal: Return mobility to safest level of function  Outcome: Progressing  Goal: Return ADL status to a safe level of function  Outcome: Progressing     Problem: Genitourinary - Adult  Goal: Absence of urinary retention  Outcome: Progressing  Goal: Urinary catheter remains patent  Outcome: Progressing     Problem: Metabolic/Fluid and Electrolytes - Adult  Goal: Electrolytes maintained within normal limits  Outcome: Progressing  Note: Elytes and mag replaced.  Goal: Glucose maintained within prescribed range  Outcome: Progressing     Problem: Anxiety  Goal: Will report anxiety at manageable levels  Description: INTERVENTIONS:  1. Administer medication as ordered  2. Teach and rehearse alternative coping skills  3. Provide emotional support with 1:1 interaction with staff  Outcome: Progressing     Problem: Coping  Goal: Pt/Family able to verbalize concerns and demonstrate effective coping strategies  Description: INTERVENTIONS:  1. Assist patient/family to identify coping skills, available support systems  and cultural and spiritual values  2. Provide emotional support, including active listening and acknowledgement of concerns of patient and caregivers  3. Reduce environmental stimuli, as able  4. Instruct patient/family in relaxation techniques, as appropriate  5. Assess for spiritual pain/suffering and initiate Spiritual Care, Psychosocial Clinical Specialist consults as needed  Outcome: Progressing     Problem: Confusion  Goal: Confusion, delirium, dementia, or psychosis is improved or at baseline  Description: INTERVENTIONS:  1. Assess for possible contributors to thought disturbance, including medications, impaired vision or hearing, underlying metabolic abnormalities, dehydration, psychiatric diagnoses, and notify attending LIP  2. [place] high risk fall precautions, as indicated  3. Provide frequent short contacts to provide reality reorientation, refocusing and direction  4. Decrease environmental stimuli, including noise as appropriate  5. Monitor and intervene to maintain adequate nutrition, hydration, elimination, sleep and activity  6. If unable to ensure safety without constant attention obtain sitter and review sitter guidelines with assigned personnel  7. Initiate Psychosocial CNS and Spiritual Care consult, as indicated  Outcome: Progressing

## 2024-06-10 NOTE — CARE COORDINATION
ONGOING DISCHARGE PLAN:    Patient is alert and oriented x4.    Spoke with patient regarding discharge plan and patient confirms that plan is still to DC to ARU.    Writer spoke to Harvey, from [facility], Pt. Should be able to DC today. She has 2 DC there & then they would be able to accept her.     Writer notified, her that pt. Will need Holter Monitor prior to DC.     Per Harvey, Pt's , spoke to Indigo from [facility] in regards to her income issues.     Report will need to be called to: [phone].         Will continue to follow for additional discharge needs.    If patient is discharged prior to next notation, then this note serves as note for discharge by case management.    Electronically signed by Indigo Rios RN on 6/10/2024 at 12:13 PM     ADD: Writer was notified, by Harvey, from [facility], that they would be able to accept pt. Around 4:30 today. Writer did inform Resident, he stated, Dr. Zelaya, needs to see her first prior to DC. Labs have also been ordered. Writer will follow.

## 2024-06-10 NOTE — PROGRESS NOTES
Date:   6/10/2024  Patient name: Kavya De Santiago  Date of admission:  5/26/2024  8:51 AM  MRN:   [id number]  YOB: 1960  PCP: Shaina Hamilton MD    Reason for Admission: Colitis [K52.9]    Cardiology follow-up: Positive blood culture, Haemophilus influenzae bacteremia, negative transesophageal echo examination, nonsustained ventricular tachycardia       Referring physician: Dr. Joel Harris                            Impression     1.Admission 5/26/2024 acute hypoxic respiratory failure, intubated 5/26/2020 mostly for fever and altered mental status, extubated 6/2/2024  2.Septic shock with haemophilus influenza bacteremia and meningitis CSF fluid positive by molecular panel for H. 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  Mastoiditis/sinusitis on most recent CT of the head 6/6/2024  5. Type 2 diabetes  6.  Normal LV function 2D echo 5/28/2024 RVSP 52 mm Hg  7.  Overweight/KVNG/history of snoring  8.  Chronic back pain has been on opiates, gabapentin, Lyrica  9.  History of hypertension  10.  Gastroesophageal reflux disease  11.DIEGO  12.  Anemia normal MCV  13.  Episodes of nonsustained V. Tach/polymorphic VT 9:15 PM 6/9/2024, episode of 6 beats of V. tach and 7 beats supraventricular tachycardia at 9:30 AM 6/10/2024, serum magnesium was 1.5 and potassium 3.3 prior to polymorphic V. Tach    ECG 6/5/2024  Sinus rhythm heart rate 60, low voltage QRS, cannot rule out anterior infarct, left axis, artifact  ECG 6/10/2024  Sinus rhythm heart rate 60, low voltage, poor R wave progression, T wave inversion lead III and aVF, no significant change from previous ECG    History of present illness  64-year-old  female with a past medical history of hypertension, overweight, diabetes mellitus, chronic pain got hospitalized on 5/26/2024 with altered mental

## 2024-06-10 NOTE — CARE COORDINATION
ACUTE INPATIENT REHABILITATION  Financial Disclosure Statement: Eligibility and Benefit Verification    Patient Name: Kavya De Santiago MRN: [id number]     Disclosure Statement  [facility] provides 24 hour individualized service to patients with functional limitations due to, but not limited to stroke, brain injury, spinal cord injury, major multiple trauma, hip fracture, amputation, and neurological disorders.  Acute Inpatient Rehabilitation provides rehabilitative nursing, physician coverage, as well as physical therapy, occupational therapy, speech therapy, recreational therapy and other services as deemed necessary by our Board Certified Physical Medicine and Rehabilitation Physicians, Dr. Jose Moran and Dr. Judith Boyer and Dr. Chelsie Haynes.    [facility] is fully accredited by the Commission on Accreditation of Rehabilitation Facilities (CARF) and The Joint Commission (TJC).    At a minimum, you will receive 5 days of therapy services totaling at least 15 hours per week. Your treatment program will consist of physical therapy at least 7.5 hours per week; occupational therapy 7.5 hours per week; and speech therapy 1.5 hours per week, as applicable.    Your estimated length of stay is currently:  Approximately 2 Weeks  Please Note: Estimated length of stay is based on individual condition and Acute Inpatient Rehabilitation specific needs. Length of stay may vary based upon interdisciplinary team assessment, insurance approval, and patient progression.    Your insurance coverage has been verified as follows:   Primary Insurance: Pending Medicaid - [place]    *Application submitted                    Coverage: Per [place] Medicaid Guidelines, Per Medicaid Benefit Period      Secondary Insurance: None  Secondary insurance policies often cover co-payments amounts.  Please contact your insurance company

## 2024-06-10 NOTE — CONSULTS
* 138  --  148*   K 3.5* 3.1* 4.5 3.6*   * 105  --  114*   CO2 20 15*  --  20   BUN 12 8  --  8   CREATININE 0.7 0.6  --  0.7   GLUCOSE 106* 168*  --  168*     Hepatic: No results for input(s): \"AST\", \"ALT\", \"BILITOT\", \"ALKPHOS\" in the last 72 hours.    Invalid input(s): \"ALB\"  Troponin: No results for input(s): \"TROPONINI\" in the last 72 hours.  BNP: No results for input(s): \"BNP\" in the last 72 hours.  Lipids: No results for input(s): \"CHOL\", \"HDL\" in the last 72 hours.    Invalid input(s): \"LDLCALCU\"  INR: No results for input(s): \"INR\" in the last 72 hours.    Objective:   Vitals: BP (!) 162/72   Pulse 84   Temp 98.7 °F (37.1 °C) (Oral)   Resp 18   Ht 1.575 m (5' 2\")   Wt 102.7 kg (226 lb 6.6 oz)   SpO2 96%   BMI 41.41 kg/m²   General appearance: alert and cooperative with exam  HEENT: Head: Normal, normocephalic, atraumatic.  Neck: no JVD and supple, symmetrical, trachea midline  Lungs: diminished breath sounds bibasilar  Heart: regular rate and rhythm  Abdomen:  Soft bowel sounds present  Extremities: Homans sign is negative, no sign of DVT  Neurologic: Mental status: Awake communicating well      EKG: Sinus rhythm, artifacts, left axis, poor R wave progression.  ECHO: reviewed.   Ejection fraction: 60%, no obvious significant valvular abnormality, no evidence of endocarditis EMANUEL 6/7/2024  Stress Test: not obtained.  Cardiac Angiography: not obtained.    Assessment / Acute Cardiac Problems:   1.Patient admitted with acute hypoxic respiratory failure, altered mental status required intubation 5/26/2024 extubated 6/2/2024  2.Febrile illness, septic shock required pressor agents, blood culture positive haemophilus influenza  3.Status post lumbar puncture 5/29/2024 cloudy fluid, 280 WBC, meningitis panel was positive for haemophilus influenza  4.EMANUEL 6/7/2024 no evidence of endocarditis  5.Acute to subacute infarct in the left posterior frontal lobe  6.Normal LV systolic function  7.Diabetes  mellitus, hypertension, hyperlipidemia    Patient Active Problem List:     Cervical spondylosis     Type 2 diabetes mellitus without complication, without long-term current use of insulin (HCC)     Hypertension     Abnormal auditory perception     Nasal polyps     Osteoarthritis of left knee     Osteoarthritis of right knee     DIEGO (nonalcoholic steatohepatitis)     Vitamin D deficiency     Iron deficiency anemia     Dyslipidemia     Trochanteric bursitis     Chronic left shoulder pain     Restless legs syndrome     Generalized anxiety disorder     Acute encephalopathy     Class 2 obesity in adult     Leg edema     Recurrent major depressive disorder (HCC)     Dermatitis     Retrolisthesis of vertebrae     Bilateral carpal tunnel syndrome     Intention tremor     Sciatica     Back pain with radiation     Left sciatic nerve pain     Lumbar disc disease with radiculopathy     Intractable back pain     Piriformis syndrome of left side     Ulnar neuropathy at elbow, left     S/P decompression of ulnar nerve at elbow     Cervical myelopathy with cervical radiculopathy (HCC)     Lumbar spondylosis     Right median nerve neuropathy     Sacroiliitis (HCC)     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 obesity (BMI 35.0-39.9) with comorbidity (HCC)     Stenosis of lateral recess of lumbosacral spine     Type 2 diabetes mellitus with chronic kidney disease     Sacroiliac joint pain     Type 2 diabetes mellitus with hyperglycemia     Severe diarrhea     Pancolitis (HCC)     Fever     Acute respiratory failure (HCC)     Colitis     Haemophilus influenzae meningitis     Bacteremia     Sepsis due to Haemophilus influenzae with acute respiratory failure without septic shock (HCC)     Anemia     Elevated LFTs     Unresponsive state     Meningoencephalitis     History of TIA (transient ischemic attack)

## 2024-06-10 NOTE — PROGRESS NOTES
Physical Therapy  Facility/Department: [facility] PROGRESSIVE CARE  Daily Treatment Note  NAME: Kavya De Santiago  : 1960  MRN: [id number]    Date of Service: 6/10/2024    Discharge Recommendations:  Therapy recommended at discharge        Patient Diagnosis(es): The primary encounter diagnosis was Colitis. Diagnoses of Acute respiratory failure, unspecified whether with hypoxia or hypercapnia (HCC), Hypomagnesemia, Shortness of breath, Bacteremia, Cerebrovascular accident (CVA) due to embolism of anterior cerebral artery, unspecified blood vessel laterality (HCC), Endocarditis, unspecified chronicity, unspecified endocarditis type, Cerebrovascular accident (CVA) due to embolism of left middle cerebral artery (HCC), and Cerebrovascular accident (CVA), unspecified mechanism (HCC) were also pertinent to this visit.    Assessment   Activity Tolerance: Patient limited by fatigue;Patient limited by endurance;Patient limited by pain (headache)     Plan    Physical Therapy Plan  General Plan: 5-7 times per week  Specific Instructions for Next Treatment: attempt gait  Current Treatment Recommendations: Strengthening;ROM;Balance training;Functional mobility training;Transfer training;Gait training;Cognitive/Perceptual training;Safety education & training;Patient/Caregiver education & training;Positioning;Therapeutic activities     Restrictions  Restrictions/Precautions  Restrictions/Precautions: Fall Risk, General Precautions  Required Braces or Orthoses?: No  Implants present? :  ( denies)     Subjective    Subjective  Subjective: Pt very fatigued on arrival. States she's willing to attempt but reports not being able to do much throughout treatment  Pain: pt reports 9/10 headache  Orientation  Overall Orientation Status: Impaired  Orientation Level: Disoriented to time;Disoriented to place;Oriented to person  Cognition  Overall Cognitive Status: Exceptions  Arousal/Alertness: Delayed responses to stimuli;Inconsistent  visits  Short Term Goal 1: Pt to demo All bed mobility with Giovanni/CGA with cueing as needed  Short Term Goal 2: Pt to demo safe functional transfers with RW and MinAx1  Short Term Goal 3: Pt to ambulate 50' with RW and Giovanni + chair follow for safety  Short Term Goal 4: Pt to engage in 25+ minutes of therapeutic activity/exercise to improve functional endurance  Patient Goals   Patient Goals : Pt does not verbally state    Education  Patient Education  Education Given To: Patient  Education Provided: Plan of Care;Home Exercise Program  Education Method: Demonstration;Verbal  Barriers to Learning: Cognition;Hearing  Education Outcome: Continued education needed    AM-PAC - Mobility    AM-PAC Basic Mobility - Inpatient   How much help is needed turning from your back to your side while in a flat bed without using bedrails?: A Little  How much help is needed moving from lying on your back to sitting on the side of a flat bed without using bedrails?: A Little  How much help is needed moving to and from a bed to a chair?: Total  How much help is needed standing up from a chair using your arms?: Total  How much help is needed walking in hospital room?: Total  How much help is needed climbing 3-5 steps with a railing?: Total  AM-PAC Inpatient Mobility Raw Score : 10  AM-PAC Inpatient T-Scale Score : 32.29  Mobility Inpatient CMS 0-100% Score: 76.75  Mobility Inpatient CMS G-Code Modifier : CL         Therapy Time   Individual Concurrent Group Co-treatment   Time In 0859         Time Out 0929         Minutes 30                 Surjit Rothman, PTA

## 2024-06-10 NOTE — PROGRESS NOTES
SPEECH LANGUAGE PATHOLOGY  Speech Language Pathology  ST PROGRESSIVE CARE    Cognitive Treatment Note    Date: 6/10/2024  Patient’s Name: Kavya De Santiago  MRN: [id number]  Diagnosis:   Patient Active Problem List   Diagnosis Code    Cervical spondylosis M47.812    Type 2 diabetes mellitus without complication, without long-term current use of insulin ([facility]) E11.9    Hypertension I10    Abnormal auditory perception H93.299    Nasal polyps J33.9    Osteoarthritis of left knee M17.12    Osteoarthritis of right knee M17.11    DIEGO (nonalcoholic steatohepatitis) K75.81    Vitamin D deficiency E55.9    Iron deficiency anemia D50.9    Dyslipidemia E78.5    Trochanteric bursitis M70.60    Chronic left shoulder pain M25.512, G89.29    Restless legs syndrome G25.81    Generalized anxiety disorder F41.1    Acute encephalopathy G93.40    Class 2 obesity in adult E66.9    Leg edema R60.0    Recurrent major depressive disorder ([facility]) F33.9    Dermatitis L30.9    Retrolisthesis of vertebrae M43.10    Bilateral carpal tunnel syndrome G56.03    Intention tremor G25.2    Sciatica M54.30    Back pain with radiation M54.9    Left sciatic nerve pain M54.32    Lumbar disc disease with radiculopathy M51.16    Intractable back pain M54.9    Piriformis syndrome of left side G57.02    Ulnar neuropathy at elbow, left G56.22    S/P decompression of ulnar nerve at elbow Z98.890    Cervical myelopathy with cervical radiculopathy ([facility]) G95.9, M54.12    Lumbar spondylosis M47.816    Right median nerve neuropathy G56.11    Sacroiliitis ([facility]) M46.1    Secondary diabetes mellitus with stage 2 chronic kidney disease (GFR 60-89) ([facility]) E13.22, N18.2    Benign hypertension with CKD (chronic kidney disease) stage III ([facility]) I12.9, N18.30    Severe obesity (BMI 35.0-39.9) with comorbidity ([facility]) E66.01    Stenosis of lateral recess of lumbosacral spine M48.07    Type 2 diabetes mellitus with chronic kidney disease E11.22    Sacroiliac joint pain M53.3

## 2024-06-11 ENCOUNTER — TELEPHONE (OUTPATIENT)
Dept: FAMILY MEDICINE CLINIC | Age: 64
End: 2024-06-11

## 2024-06-11 ENCOUNTER — HOSPITAL ENCOUNTER (INPATIENT)
Age: 64
Discharge: HOME | End: 2024-06-13
Payer: COMMERCIAL

## 2024-06-11 ENCOUNTER — APPOINTMENT (OUTPATIENT)
Dept: NUCLEAR MEDICINE | Age: 64
End: 2024-06-11
Payer: COMMERCIAL

## 2024-06-11 LAB
ANION GAP SERPL CALCULATED.3IONS-SCNC: 7 MMOL/L (ref 9–17)
BASOPHILS # BLD: 0.1 K/UL (ref 0–0.2)
BASOPHILS NFR BLD: 1 % (ref 0–2)
BUN SERPL-MCNC: 8 MG/DL (ref 8–23)
CALCIUM SERPL-MCNC: 8.3 MG/DL (ref 8.6–10.4)
CHLORIDE SERPL-SCNC: 110 MMOL/L (ref 98–107)
CO2 SERPL-SCNC: 27 MMOL/L (ref 20–31)
CREAT SERPL-MCNC: 0.7 MG/DL (ref 0.5–0.9)
EOSINOPHIL # BLD: 0.5 K/UL (ref 0–0.4)
EOSINOPHILS RELATIVE PERCENT: 7 % (ref 0–4)
ERYTHROCYTE [DISTWIDTH] IN BLOOD BY AUTOMATED COUNT: 15.3 % (ref 11.5–14.9)
GFR, ESTIMATED: >90 ML/MIN/1.73M2
GLUCOSE BLD-MCNC: 154 MG/DL (ref 65–105)
GLUCOSE BLD-MCNC: 156 MG/DL (ref 65–105)
GLUCOSE BLD-MCNC: 176 MG/DL (ref 65–105)
GLUCOSE BLD-MCNC: 181 MG/DL (ref 65–105)
GLUCOSE BLD-MCNC: 243 MG/DL (ref 65–105)
GLUCOSE SERPL-MCNC: 167 MG/DL (ref 70–99)
HCT VFR BLD AUTO: 27.8 % (ref 36–46)
HGB BLD-MCNC: 8.9 G/DL (ref 12–16)
LYMPHOCYTES NFR BLD: 2.1 K/UL (ref 1–4.8)
LYMPHOCYTES RELATIVE PERCENT: 31 % (ref 24–44)
MAGNESIUM SERPL-MCNC: 1.9 MG/DL (ref 1.6–2.6)
MCH RBC QN AUTO: 28.2 PG (ref 26–34)
MCHC RBC AUTO-ENTMCNC: 32.2 G/DL (ref 31–37)
MCV RBC AUTO: 87.6 FL (ref 80–100)
MICROORGANISM SPEC CULT: NORMAL
MONOCYTES NFR BLD: 0.9 K/UL (ref 0.1–1.3)
MONOCYTES NFR BLD: 13 % (ref 1–7)
NEUTROPHILS NFR BLD: 48 % (ref 36–66)
NEUTS SEG NFR BLD: 3.2 K/UL (ref 1.3–9.1)
NUC STRESS EJECTION FRACTION: 67 %
PLATELET # BLD AUTO: 376 K/UL (ref 150–450)
PMV BLD AUTO: 7.4 FL (ref 6–12)
POTASSIUM SERPL-SCNC: 4 MMOL/L (ref 3.7–5.3)
RBC # BLD AUTO: 3.17 M/UL (ref 4–5.2)
SERVICE CMNT-IMP: NORMAL
SODIUM SERPL-SCNC: 144 MMOL/L (ref 135–144)
SPECIMEN DESCRIPTION: NORMAL
STRESS BASELINE DIAS BP: 71 MMHG
STRESS BASELINE HR: 70 BPM
STRESS BASELINE SYS BP: 178 MMHG
STRESS ESTIMATED WORKLOAD: 1 METS
STRESS PEAK DIAS BP: 77 MMHG
STRESS PEAK SYS BP: 177 MMHG
STRESS PERCENT HR ACHIEVED: 64 %
STRESS POST PEAK HR: 100 BPM
STRESS RATE PRESSURE PRODUCT: NORMAL BPM*MMHG
STRESS ST DEPRESSION: 0 MM
STRESS STAGE RECOVERY 1 BP: NORMAL MMHG
STRESS STAGE RECOVERY 1 DURATION: 1 MIN:SEC
STRESS STAGE RECOVERY 1 HR: 100 BPM
STRESS STAGE RECOVERY 2 BP: NORMAL MMHG
STRESS STAGE RECOVERY 2 DURATION: 6 MIN:SEC
STRESS STAGE RECOVERY 2 HR: 94 BPM
STRESS TARGET HR: 157 BPM
TID: 0.96
WBC OTHER # BLD: 6.7 K/UL (ref 3.5–11)

## 2024-06-11 PROCEDURE — 6360000002 HC RX W HCPCS

## 2024-06-11 PROCEDURE — A9500 TC99M SESTAMIBI: HCPCS | Performed by: INTERNAL MEDICINE

## 2024-06-11 PROCEDURE — 93017 CV STRESS TEST TRACING ONLY: CPT

## 2024-06-11 PROCEDURE — 6370000000 HC RX 637 (ALT 250 FOR IP): Performed by: INTERNAL MEDICINE

## 2024-06-11 PROCEDURE — 36415 COLL VENOUS BLD VENIPUNCTURE: CPT

## 2024-06-11 PROCEDURE — 2580000003 HC RX 258

## 2024-06-11 PROCEDURE — 6360000002 HC RX W HCPCS: Performed by: INTERNAL MEDICINE

## 2024-06-11 PROCEDURE — 3430000000 HC RX DIAGNOSTIC RADIOPHARMACEUTICAL: Performed by: INTERNAL MEDICINE

## 2024-06-11 PROCEDURE — 6370000000 HC RX 637 (ALT 250 FOR IP)

## 2024-06-11 PROCEDURE — 82947 ASSAY GLUCOSE BLOOD QUANT: CPT

## 2024-06-11 PROCEDURE — 2580000003 HC RX 258: Performed by: INTERNAL MEDICINE

## 2024-06-11 PROCEDURE — 6370000000 HC RX 637 (ALT 250 FOR IP): Performed by: NURSE PRACTITIONER

## 2024-06-11 PROCEDURE — 2060000000 HC ICU INTERMEDIATE R&B

## 2024-06-11 PROCEDURE — 83735 ASSAY OF MAGNESIUM: CPT

## 2024-06-11 PROCEDURE — 99232 SBSQ HOSP IP/OBS MODERATE 35: CPT | Performed by: INTERNAL MEDICINE

## 2024-06-11 PROCEDURE — 78452 HT MUSCLE IMAGE SPECT MULT: CPT

## 2024-06-11 PROCEDURE — 85025 COMPLETE CBC W/AUTO DIFF WBC: CPT

## 2024-06-11 PROCEDURE — 80048 BASIC METABOLIC PNL TOTAL CA: CPT

## 2024-06-11 PROCEDURE — C9113 INJ PANTOPRAZOLE SODIUM, VIA: HCPCS | Performed by: INTERNAL MEDICINE

## 2024-06-11 RX ORDER — ATROPINE SULFATE 0.1 MG/ML
0.5 INJECTION INTRAVENOUS EVERY 5 MIN PRN
Status: ACTIVE | OUTPATIENT
Start: 2024-06-11 | End: 2024-06-11

## 2024-06-11 RX ORDER — NITROGLYCERIN 0.4 MG/1
0.4 TABLET SUBLINGUAL EVERY 5 MIN PRN
Status: ACTIVE | OUTPATIENT
Start: 2024-06-11 | End: 2024-06-11

## 2024-06-11 RX ORDER — REGADENOSON 0.08 MG/ML
0.4 INJECTION, SOLUTION INTRAVENOUS
Status: COMPLETED | OUTPATIENT
Start: 2024-06-11 | End: 2024-06-11

## 2024-06-11 RX ORDER — SODIUM CHLORIDE 0.9 % (FLUSH) 0.9 %
10 SYRINGE (ML) INJECTION PRN
Status: DISCONTINUED | OUTPATIENT
Start: 2024-06-11 | End: 2024-06-14 | Stop reason: HOSPADM

## 2024-06-11 RX ORDER — ALBUTEROL SULFATE 90 UG/1
2 AEROSOL, METERED RESPIRATORY (INHALATION) PRN
Status: ACTIVE | OUTPATIENT
Start: 2024-06-11 | End: 2024-06-11

## 2024-06-11 RX ORDER — METOPROLOL TARTRATE 1 MG/ML
5 INJECTION, SOLUTION INTRAVENOUS EVERY 5 MIN PRN
Status: ACTIVE | OUTPATIENT
Start: 2024-06-11 | End: 2024-06-11

## 2024-06-11 RX ORDER — LIDOCAINE 4 G/G
1 PATCH TOPICAL DAILY
Status: DISCONTINUED | OUTPATIENT
Start: 2024-06-11 | End: 2024-06-14 | Stop reason: HOSPADM

## 2024-06-11 RX ORDER — SODIUM CHLORIDE 0.9 % (FLUSH) 0.9 %
5-40 SYRINGE (ML) INJECTION PRN
Status: ACTIVE | OUTPATIENT
Start: 2024-06-11 | End: 2024-06-11

## 2024-06-11 RX ORDER — AMINOPHYLLINE 25 MG/ML
50 INJECTION, SOLUTION INTRAVENOUS PRN
Status: ACTIVE | OUTPATIENT
Start: 2024-06-11 | End: 2024-06-11

## 2024-06-11 RX ORDER — TETRAKIS(2-METHOXYISOBUTYLISOCYANIDE)COPPER(I) TETRAFLUOROBORATE 1 MG/ML
35 INJECTION, POWDER, LYOPHILIZED, FOR SOLUTION INTRAVENOUS
Status: COMPLETED | OUTPATIENT
Start: 2024-06-11 | End: 2024-06-11

## 2024-06-11 RX ORDER — KETOROLAC TROMETHAMINE 30 MG/ML
15 INJECTION, SOLUTION INTRAMUSCULAR; INTRAVENOUS ONCE
Status: COMPLETED | OUTPATIENT
Start: 2024-06-11 | End: 2024-06-11

## 2024-06-11 RX ORDER — SODIUM CHLORIDE 9 MG/ML
500 INJECTION, SOLUTION INTRAVENOUS CONTINUOUS PRN
Status: ACTIVE | OUTPATIENT
Start: 2024-06-11 | End: 2024-06-11

## 2024-06-11 RX ORDER — LANOLIN ALCOHOL/MO/W.PET/CERES
400 CREAM (GRAM) TOPICAL DAILY
Status: DISCONTINUED | OUTPATIENT
Start: 2024-06-11 | End: 2024-06-14 | Stop reason: HOSPADM

## 2024-06-11 RX ORDER — TETRAKIS(2-METHOXYISOBUTYLISOCYANIDE)COPPER(I) TETRAFLUOROBORATE 1 MG/ML
15 INJECTION, POWDER, LYOPHILIZED, FOR SOLUTION INTRAVENOUS
Status: COMPLETED | OUTPATIENT
Start: 2024-06-11 | End: 2024-06-11

## 2024-06-11 RX ADMIN — ANTI-FUNGAL POWDER MICONAZOLE NITRATE TALC FREE: 1.42 POWDER TOPICAL at 20:52

## 2024-06-11 RX ADMIN — ROPINIROLE HYDROCHLORIDE 2 MG: 2 TABLET, FILM COATED ORAL at 20:51

## 2024-06-11 RX ADMIN — SODIUM CHLORIDE, PRESERVATIVE FREE 10 ML: 5 INJECTION INTRAVENOUS at 20:52

## 2024-06-11 RX ADMIN — LATANOPROST 1 DROP: 50 SOLUTION OPHTHALMIC at 20:54

## 2024-06-11 RX ADMIN — SODIUM CHLORIDE, PRESERVATIVE FREE 10 ML: 5 INJECTION INTRAVENOUS at 13:01

## 2024-06-11 RX ADMIN — KETOROLAC TROMETHAMINE 15 MG: 30 INJECTION, SOLUTION INTRAMUSCULAR at 14:46

## 2024-06-11 RX ADMIN — HYDRALAZINE HYDROCHLORIDE 50 MG: 50 TABLET ORAL at 05:26

## 2024-06-11 RX ADMIN — SODIUM CHLORIDE, PRESERVATIVE FREE 10 ML: 5 INJECTION INTRAVENOUS at 11:42

## 2024-06-11 RX ADMIN — DICLOFENAC SODIUM 4 G: 10 GEL TOPICAL at 20:53

## 2024-06-11 RX ADMIN — LABETALOL HYDROCHLORIDE 10 MG: 5 INJECTION, SOLUTION INTRAVENOUS at 05:26

## 2024-06-11 RX ADMIN — Medication 400 MG: at 20:55

## 2024-06-11 RX ADMIN — CEFTRIAXONE SODIUM 2000 MG: 2 INJECTION, POWDER, FOR SOLUTION INTRAMUSCULAR; INTRAVENOUS at 16:33

## 2024-06-11 RX ADMIN — BUSPIRONE HYDROCHLORIDE 10 MG: 10 TABLET ORAL at 20:51

## 2024-06-11 RX ADMIN — INSULIN LISPRO 2 UNITS: 100 INJECTION, SOLUTION INTRAVENOUS; SUBCUTANEOUS at 16:39

## 2024-06-11 RX ADMIN — ATORVASTATIN CALCIUM 40 MG: 40 TABLET, FILM COATED ORAL at 20:51

## 2024-06-11 RX ADMIN — LISINOPRIL 40 MG: 20 TABLET ORAL at 11:41

## 2024-06-11 RX ADMIN — BUSPIRONE HYDROCHLORIDE 10 MG: 10 TABLET ORAL at 11:40

## 2024-06-11 RX ADMIN — PANTOPRAZOLE SODIUM 40 MG: 40 INJECTION, POWDER, FOR SOLUTION INTRAVENOUS at 11:47

## 2024-06-11 RX ADMIN — INSULIN LISPRO 2 UNITS: 100 INJECTION, SOLUTION INTRAVENOUS; SUBCUTANEOUS at 20:51

## 2024-06-11 RX ADMIN — ENOXAPARIN SODIUM 30 MG: 100 INJECTION SUBCUTANEOUS at 20:51

## 2024-06-11 RX ADMIN — METOPROLOL 100 MG: 100 TABLET ORAL at 20:51

## 2024-06-11 RX ADMIN — ACETAMINOPHEN 650 MG: 325 TABLET ORAL at 01:03

## 2024-06-11 RX ADMIN — ASPIRIN 81 MG: 81 TABLET, COATED ORAL at 11:41

## 2024-06-11 RX ADMIN — DICLOFENAC SODIUM 4 G: 10 GEL TOPICAL at 13:58

## 2024-06-11 RX ADMIN — ANTI-FUNGAL POWDER MICONAZOLE NITRATE TALC FREE: 1.42 POWDER TOPICAL at 11:47

## 2024-06-11 RX ADMIN — ENOXAPARIN SODIUM 30 MG: 100 INJECTION SUBCUTANEOUS at 11:41

## 2024-06-11 RX ADMIN — Medication 38 MILLICURIE: at 13:00

## 2024-06-11 RX ADMIN — HYDRALAZINE HYDROCHLORIDE 50 MG: 50 TABLET ORAL at 13:59

## 2024-06-11 RX ADMIN — ALTEPLASE 2 MG: 2.2 INJECTION, POWDER, LYOPHILIZED, FOR SOLUTION INTRAVENOUS at 13:59

## 2024-06-11 RX ADMIN — QUETIAPINE FUMARATE 50 MG: 50 TABLET ORAL at 20:51

## 2024-06-11 RX ADMIN — REGADENOSON 0.4 MG: 0.08 INJECTION, SOLUTION INTRAVENOUS at 09:11

## 2024-06-11 RX ADMIN — INSULIN GLARGINE 20 UNITS: 100 INJECTION, SOLUTION SUBCUTANEOUS at 11:41

## 2024-06-11 RX ADMIN — CEFTRIAXONE SODIUM 2000 MG: 2 INJECTION, POWDER, FOR SOLUTION INTRAMUSCULAR; INTRAVENOUS at 04:22

## 2024-06-11 RX ADMIN — ROPINIROLE HYDROCHLORIDE 2 MG: 2 TABLET, FILM COATED ORAL at 11:40

## 2024-06-11 RX ADMIN — HYDRALAZINE HYDROCHLORIDE 50 MG: 50 TABLET ORAL at 20:51

## 2024-06-11 RX ADMIN — METOPROLOL 100 MG: 100 TABLET ORAL at 11:41

## 2024-06-11 RX ADMIN — Medication 16.4 MILLICURIE: at 09:13

## 2024-06-11 ASSESSMENT — ENCOUNTER SYMPTOMS
VOMITING: 0
CONSTIPATION: 0
NAUSEA: 0

## 2024-06-11 ASSESSMENT — PAIN SCALES - GENERAL
PAINLEVEL_OUTOF10: 0
PAINLEVEL_OUTOF10: 0
PAINLEVEL_OUTOF10: 7
PAINLEVEL_OUTOF10: 9
PAINLEVEL_OUTOF10: 4
PAINLEVEL_OUTOF10: 0

## 2024-06-11 ASSESSMENT — PAIN DESCRIPTION - LOCATION
LOCATION: HEAD
LOCATION: HEAD

## 2024-06-11 ASSESSMENT — PAIN - FUNCTIONAL ASSESSMENT: PAIN_FUNCTIONAL_ASSESSMENT: ACTIVITIES ARE NOT PREVENTED

## 2024-06-11 ASSESSMENT — PAIN DESCRIPTION - DESCRIPTORS: DESCRIPTORS: ACHING

## 2024-06-11 NOTE — PROGRESS NOTES
Spoke with LEELA Sharp regarding Event Monitor order, pt likely discharging to Acute Rehab Thursday 6/13.

## 2024-06-11 NOTE — TELEPHONE ENCOUNTER
Medication Management Service (MMS)  [facility]  [phone]     CLINICAL PHARMACY NOTE:  Telephone    Patient's  (on HIPAA) called requesting a sample sensor from the pharmacy team for patient. Sample Clifford 3 sensor set aside for patient to . Lot [id number], EXP 10/31/2024.     Justyna Serna, Pharm.D., PGY2 Ambulatory Care Resident  06/11/24  =======================================================  For Pharmacy Admin Tracking Only    Program: Medical Group  CPA in place:  Yes  Recommendation Provided To: Patient/Caregiver: 1 via Telephone  Intervention Detail: Patient Access Assistance/Sample Provided  Intervention Accepted By: Patient/Caregiver: 1  Time Spent (min): 20

## 2024-06-11 NOTE — PROGRESS NOTES
Date:   6/11/2024  Patient name: Kavya De Santiago  Date of admission:  5/26/2024  8:51 AM  MRN:   [id number]  YOB: 1960  PCP: Shaina Hamilton MD    Reason for Admission: Colitis [K52.9]    Cardiology follow-up: Positive blood culture, Haemophilus influenzae bacteremia, negative transesophageal echo examination, nonsustained ventricular tachycardia        Referring physician: Dr. Joel Harris                            Impression     1.Admission 5/26/2024 acute hypoxic respiratory failure, intubated 5/26/2020 mostly for fever and altered mental status, extubated 6/2/2024  2.Septic shock with haemophilus influenza bacteremia and meningitis CSF fluid positive by molecular panel for H. 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  Mastoiditis/sinusitis on most recent CT of the head 6/6/2024  5. Type 2 diabetes  6.  Normal LV function 2D echo 5/28/2024 RVSP 52 mm Hg  7.  Overweight/KVNG/history of snoring  8.  Chronic back pain has been on opiates, gabapentin, Lyrica  9.  History of hypertension  10.  Gastroesophageal reflux disease  11.DIEGO  12.  Anemia normal MCV  13.  Episodes of nonsustained V. Tach/polymorphic VT 9:15 PM 6/9/2024, episode of 6 beats of V. tach and 7 beats supraventricular tachycardia at 9:30 AM 6/10/2024, serum magnesium was 1.5 and potassium 3.3 prior to polymorphic V. Tach  14: Lexiscan Myoview stress test no ischemia ejection fraction 69%       Investigation workup    Lexiscan Myoview stress test 6/11/2024  No ischemia no 3 times daily ejection fraction 69%    ECG 6/5/2024  Sinus rhythm heart rate 60, low voltage QRS, cannot rule out anterior infarct, left axis, artifact  ECG 6/10/2024  Sinus rhythm heart rate 60, low voltage, poor R wave progression, T wave inversion lead III and aVF, no significant change from previous

## 2024-06-11 NOTE — PROGRESS NOTES
SLP ALL NOTES  [facility]  Speech Language Pathology    Date: 6/11/2024  Patient Name: Kavya De Santiago  YOB: 1960   AGE: 64 y.o.  MRN: [id number]        Patient Not Available for Speech Therapy     Due to:  [] Testing  [] Hemodialysis  [] Cancelled by RN  [] Surgery   [] Intubation/Sedation/Pain Medication  [] Medical instability  [x] Other:    1315- ST attempt, pt. Out of room for stress test.    Will reattempt as able    1525- toileting  1540- Pt. Politely declining ST at this time d/t very bad headache and backache.  Pt. And her  report pt. Received pain meds but continues with extreme pain.     Will reattempt on 06/12    Swathi Modi M.A.CCC/SLP

## 2024-06-11 NOTE — PROGRESS NOTES
[facility]  IN-PATIENT SERVICE  [facility]    PROGRESS NOTE             6/11/2024    8:06 AM    Name:   Kavya De Santiago  MRN:     [id number]     Acct:      [id number]   Room:   2117/2117-01   Day:  16  Admit Date:  5/26/2024  8:51 AM    PCP:  Shaina Hamilton MD  Code Status:  Full Code    Subjective:     C/C:   Chief Complaint   Patient presents with    Loss of Consciousness     Interval History Status: significantly improved.    Patient seen and examined at bedside  Alert and oriented X3, following commands  Has been afebrile last 72 hours  Patient had 30 beats run of V. tach while sleeping and was asymptomatic at that time yesterday, patient will go for stress test today, electrolytes replaced potassium 4, magnesium 1.9 this morning.  Patient is to be Holter monitoring for the next 30 days as per cardiology recommendation, accepted in ARU.  Will touch base with , possible discharge today.    Brief History:     64-year-old female with history of T2DM, HTN, HLD, CKD, GERD, DIEGO, obesity who was brought to the ED for AMS, nausea, vomiting, diarrhea. Patient is intubated at the time of my visit, her  Rj is present to answer questions. Reportedly patient had some upper respiratory symptoms over the past week or so, with a dry cough, decreased appetite, rhinorrhea, congestion, headache/sinus pain. This morning at 4 AM patient began to have diarrhea,  is unsure if it was bloody or not, and she also had several episodes of vomiting. When he checked on her he found her on the ground, EMS was called, they arrived she has a GCS was 8-9, O2 saturations dropped on the way to the ED, given etomidate and Versed and placed a supraglottic airway, on arrival to the ED she was intubated. CT head showed no acute abnormality, CTA chest showed no evidence of PE, CT abdomen pelvis showed pancolitis with abnormal wall thickening of the ascending,    Component Value Date/Time    SPECIAL NOT REPORTED 10/07/2021 06:24 PM     Lab Results   Component Value Date/Time    CULTURE NO GROWTH 4 DAYS 06/06/2024 11:41 AM       [unfilled]    Radiology:    XR CHEST PORTABLE    Result Date: 6/6/2024  EXAMINATION: ONE XRAY VIEW OF THE CHEST 6/6/2024 3:20 pm COMPARISON: 06/03/2024 HISTORY: ORDERING SYSTEM PROVIDED HISTORY: r/o infections process due to new fever TECHNOLOGIST PROVIDED HISTORY: r/o infections process due to new fever Reason for Exam: r/o infections process due to new fever, best imaging due to pt not attentiveness. FINDINGS: Cardiac silhouette is within normal limits. Pulmonary vasculature is within normal limits. The lungs are clear. No pneumothorax is identified. Bony structures are unremarkable.Right PICC line is stable.  NG tube has been removed.     No acute cardiopulmonary process.     CT HEAD WO CONTRAST    Result Date: 6/6/2024  EXAMINATION: CT OF THE HEAD WITHOUT CONTRAST  6/6/2024 8:32 am TECHNIQUE: CT of the head was performed without the administration of intravenous contrast. Automated exposure control, iterative reconstruction, and/or weight based adjustment of the mA/kV was utilized to reduce the radiation dose to as low as reasonably achievable. COMPARISON: MR brain 06/03/2024, CT brain/CT angiogram brain 06/01/2024. HISTORY: ORDERING SYSTEM PROVIDED HISTORY: AMS, agitation TECHNOLOGIST PROVIDED HISTORY:  AMS, agitation Reason for Exam: AMS, agitation, LOC FINDINGS: BRAIN/VENTRICLES: There is no acute infarct or acute intracranial hemorrhage present.  There is no mass effect or midline shift present. There is no ventriculomegaly or abnormal extra-axial fluid collection present.  Mild periventricular hypoattenuation is unchanged. ORBITS: Limited evaluation of the orbits is unremarkable. SINUSES: Fluid levels are present within the frontal sinuses.  There is fluid within the middle ears and mastoid air cells. SOFT TISSUES/SKULL:  No lytic or

## 2024-06-11 NOTE — PLAN OF CARE
Problem: Safety - Adult  Goal: Free from fall injury  6/11/2024 0348 by Katarina Darden RN  Outcome: Progressing     Problem: Discharge Planning  Goal: Discharge to home or other facility with appropriate resources  6/11/2024 0348 by Katarina Darden RN  Outcome: Progressing     Problem: Pain  Goal: Verbalizes/displays adequate comfort level or baseline comfort level  6/11/2024 0348 by Katarina Darden RN  Outcome: Progressing     Problem: Skin/Tissue Integrity  Goal: Absence of new skin breakdown  Description: 1.  Monitor for areas of redness and/or skin breakdown  2.  Assess vascular access sites hourly  3.  Every 4-6 hours minimum:  Change oxygen saturation probe site  4.  Every 4-6 hours:  If on nasal continuous positive airway pressure, respiratory therapy assess nares and determine need for appliance change or resting period.  6/11/2024 0348 by Katarina Darden RN  Outcome: Progressing     Problem: ABCDS Injury Assessment  Goal: Absence of physical injury  6/11/2024 0348 by Katarina Darden RN  Outcome: Progressing     Problem: Cardiovascular - Adult  Goal: Maintains optimal cardiac output and hemodynamic stability  6/11/2024 0348 by Katarina Darden RN  Outcome: Progressing     Problem: Cardiovascular - Adult  Goal: Absence of cardiac dysrhythmias or at baseline  6/11/2024 0348 by Katarina Darden RN  Outcome: Progressing     Problem: Skin/Tissue Integrity - Adult  Goal: Skin integrity remains intact  6/11/2024 0348 by Katarina Darden RN  Outcome: Progressing     Problem: Skin/Tissue Integrity - Adult  Goal: Incisions, wounds, or drain sites healing without S/S of infection  6/11/2024 0348 by Katarina Darden RN  Outcome: Progressing     Problem: Skin/Tissue Integrity - Adult  Goal: Oral mucous membranes remain intact  6/11/2024 0348 by Katarina Darden RN  Outcome: Progressing     Problem: Gastrointestinal - Adult  Goal: Minimal or absence of nausea and vomiting  6/11/2024 0348 by Katarina Darden RN  Outcome: Progressing     Problem:  reality reorientation, refocusing and direction  4. Decrease environmental stimuli, including noise as appropriate  5. Monitor and intervene to maintain adequate nutrition, hydration, elimination, sleep and activity  6. If unable to ensure safety without constant attention obtain sitter and review sitter guidelines with assigned personnel  7. Initiate Psychosocial CNS and Spiritual Care consult, as indicated  6/11/2024 0348 by Katarina Darden, RN  Outcome: Progressing

## 2024-06-11 NOTE — PROGRESS NOTES
SLP ALL NOTES  [facility]  Speech Language Pathology    Date: 6/11/2024  Patient Name: Kavya De Santiago  YOB: 1960   AGE: 64 y.o.  MRN: [id number]        Patient Not Available for Speech Therapy     Due to:  [] Testing  [] Hemodialysis  [] Cancelled by RN  [] Surgery   [] Intubation/Sedation/Pain Medication  [] Medical instability  [x] Other:    1315- ST attempt, pt. Out of room for stress test.    Will reattempt as able    Swathi Modi M.A.CCC/SLP

## 2024-06-11 NOTE — PLAN OF CARE
Problem: Discharge Planning  Goal: Discharge to home or other facility with appropriate resources  6/11/2024 1611 by Niin Sumner RN  Outcome: Progressing  Note: Pt will be going to ARU on thursday      Problem: Pain  Goal: Verbalizes/displays adequate comfort level or baseline comfort level  6/11/2024 1611 by Nini Sumner RN  Outcome: Progressing  Note: Pts Pain was assessed on a 0-10 pain scale, pain was assessed for pain characteristics, duration, and location.      Problem: Skin/Tissue Integrity  Goal: Absence of new skin breakdown  Description: 1.  Monitor for areas of redness and/or skin breakdown  2.  Assess vascular access sites hourly  3.  Every 4-6 hours minimum:  Change oxygen saturation probe site  4.  Every 4-6 hours:  If on nasal continuous positive airway pressure, respiratory therapy assess nares and determine need for appliance change or resting period.  6/11/2024 1611 by Nini Sumner RN  Outcome: Progressing  Note: Pt was assessed for skin break down and risk of skin break down. The patient was turned and repositioned to help prevent skin breakdown      Problem: Cardiovascular - Adult  Goal: Maintains optimal cardiac output and hemodynamic stability  6/11/2024 1611 by Nini Sumner RN  Outcome: Progressing  Flowsheets (Taken 6/11/2024 1611)  Maintains optimal cardiac output and hemodynamic stability: Monitor blood pressure and heart rate  Note: Pt was on heart monitor to assess for cardiac arrhythmias.      Problem: Metabolic/Fluid and Electrolytes - Adult  Goal: Glucose maintained within prescribed range  6/11/2024 1611 by Nini Sumner RN  Outcome: Progressing  Flowsheets (Taken 6/11/2024 1611)  Glucose maintained within prescribed range:   Monitor blood glucose as ordered   Administer ordered medications to maintain glucose within target range   Instruct patient on self management of diabetes and initiate consult as needed

## 2024-06-11 NOTE — PROGRESS NOTES
Comprehensive Nutrition Assessment    Type and Reason for Visit:  Reassess    Nutrition Recommendations/Plan:   Will provide 4 carbohydrate choices with Glucerna all trays as able     Malnutrition Assessment:  Malnutrition Status:  Insufficient data (05/28/24 1501)    Context:  Acute Illness     Findings of the 6 clinical characteristics of malnutrition:  Energy Intake:  Mild decrease in energy intake (Comment)  Weight Loss:  Unable to assess     Body Fat Loss:  Unable to assess     Muscle Mass Loss:  Unable to assess    Fluid Accumulation:  Moderate to Severe Extremities   Strength:  Not Performed    Nutrition Assessment:    Pt is not meeting nutrition needs with intake less than 50% at most meals. Spoke to pt's - he is greeable to pushing supplements at meal times.    Nutrition Related Findings:    Trace edema: Generalized/ all extremities, Labs: Glu 207, Meds: Reviewed, BM 6/7 Wound Type: None       Current Nutrition Intake & Therapies:    Average Meal Intake: 1-25%     Diet NPO Exceptions are: Sips of Water with Meds, Ice Chips    Anthropometric Measures:  Height: 157.5 cm (5' 2\")  Ideal Body Weight (IBW): 110 lbs (50 kg)    Admission Body Weight: 104.8 kg (231 lb)  Current Body Weight: 102.5 kg (226 lb), 181.8 % IBW. Weight Source: Bed Scale  Current BMI (kg/m2): 41.3                          BMI Categories: Obese Class 3 (BMI 40.0 or greater)    Estimated Daily Nutrient Needs:  Energy Requirements Based On: Formula  Weight Used for Energy Requirements: Admission  Energy (kcal/day): 1616-9952 kcals based on Webb-St. Jeor with 1.3-1.4 factor  Weight Used for Protein Requirements: Ideal  Protein (g/day):  gm protein based on 1.8-2 gm/kg  Method Used for Fluid Requirements: 1 ml/kcal    Nutrition Diagnosis:   Inadequate oral intake related to impaired respiratory function as evidenced by NPO or clear liquid status due to medical condition    Nutrition Interventions:   Food and/or Nutrient

## 2024-06-11 NOTE — PROGRESS NOTES
[facility] -   Infectious diseases evaluation  admission date 5/26/2024    reason for consultation:   High fever, unknown etiology sepsis    Impression :   Current:  Haemophilus influenza meningitis/ bacteremia  Fever/leukocytosis resolved  Possible mastoiditis and sinusitis on CT head  Acute /subacute infarct in the left posterior frontal lobe   Sepsis   Colitis  Diabetes mellitus  Chronic kidney disease  Hyperlipidemia  Hypertension  GERD  Pagan      Discussion:  Status post lumbar puncture 5/29, cloudy fluid, 280 WBC, meningitis panel was positive for haemophilus influenza.      HENCE:   Continue Ceftriaxone 2 gm IV every 12 hours through 7/10/2024 to finish 6 weeks course for possible  mastoiditis.  ENT evaluation as outpatient  Repeat blood cultures from 6/6/2024 no growth  Respiratory panel with COVID-19 - 6/6/2024  UA 6/6 showed 3-5 WBC, trace leukocyte esterase, many yeast  Chest x-ray from 6/6 showed no acute process  Procalcitonin level 0.18 on 6/6  No obvious vegetations per 2 d echo.   EMANUEL 6/7/2024 showed no vegetations  Follow CBC and renal function closely.  No growth on blood cultures from 5/28/2024.  Supportive care.        Infection Control Recommendations   [place] Precautions      Antimicrobial Stewardship Recommendations   Simplification of therapy  Targeted therapy      History of Present Illness:   Initial history:  Kavya De Santiago is a 64 y.o.-year-old female was brought to the hospital for altered mental status associated with nausea, vomiting and diarrhea.  The patient is intubated, unable to provide history that was obtained from chart review and  at the bedside had upper respiratory symptoms, congestion, headache and rhinorrhea for 1 week associated with dry cough and decreased appetite.  The patient was found on the ground on the day of admission, EMS called.  The patient was intubated at the ER.  The patient was hypothermic  of feet     lasix QOD for this    Hyperlipidemia     Hypertension     Left hand weakness     and pain, can not make fist d/t cubital tunnel issue    Lumbar radiculopathy 11/10/2021    was following with pain management and has had epidural injections    DIEGO (nonalcoholic steatohepatitis) 10/12/2014    Obesity     Osteoarthritis of left knee 2014    Recurrent major depressive disorder (HCC) 07/15/2022    Restless legs syndrome     Snores 2022    was seen by Dr. Asencio for likely sleep apnea while hospitalized , to follow up as OP for sleep study, has not been done    TIA (transient ischemic attack)     no residual symptoms    Type II or unspecified type diabetes mellitus without mention of complication, not stated as uncontrolled     Wears glasses     Wellness examination     PCP, Dr. Hamilton, last seen       Past Surgical  History:     Past Surgical History:   Procedure Laterality Date    CARPAL TUNNEL RELEASE Left 2022    CUBITAL TUNNEL DECOMPRESSION performed by Candice Harvey DO at [facility] OR     SECTION      x2    COLONOSCOPY  2012    ? polyps per pt    HYSTERECTOMY, TOTAL ABDOMINAL (CERVIX REMOVED)      with BSO    OTHER SURGICAL HISTORY Left 2022    cubital tunnel release    SHOULDER SURGERY Left 2021    SHOULDER MANIPULATION WITH PRE OP INTERSCALENE BLOCK and intraop injection performed by Ahsan Brown DO at [facility] OR    SPINE SURGERY Left 10/03/2022    S1 nerve root injection, Dr. Alston    SPINE SURGERY Left 10/06/2022    S1 NERVE ROOT INJECTION performed by Rema Alston MD at [facility] OR    TONSILLECTOMY      as child    UPPER GASTROINTESTINAL ENDOSCOPY  2012    [facility]       Medications:      cefTRIAXone (ROCEPHIN) IV  2,000 mg IntraVENous Q12H    lidocaine  1 patch TransDERmal Daily    diclofenac sodium  4 g Topical BID    hydrALAZINE  50 mg Oral 3 times per day    QUEtiapine  50 mg Oral Nightly    metoprolol tartrate  100 mg Oral

## 2024-06-11 NOTE — TELEPHONE ENCOUNTER
Patient's spouse called to inform us patient is in hospital and to speak with Patricia about  the glucose receivers and sensors

## 2024-06-11 NOTE — PROGRESS NOTES
Physical Therapy        Physical Therapy Cancel Note      DATE: 2024    NAME: Kavya De Santiago  MRN: [id number]   : 1960      Patient not seen this date for Physical Therapy due to:    Patient Declined: Attempted 1452. Pt declining at this time due to painful headache, had just received pain meds prior to writers arrival. Per nursing pt has been up in the chair during the day today. Requests to reattempt at end of day and is agreeable if symptoms are improved. Will reattempt as able. RN aware      Electronically signed by Surjit Rothman PTA on 2024 at 3:01 PM

## 2024-06-12 ENCOUNTER — APPOINTMENT (OUTPATIENT)
Age: 64
End: 2024-06-12
Attending: PSYCHIATRY & NEUROLOGY
Payer: COMMERCIAL

## 2024-06-12 LAB
ANION GAP SERPL CALCULATED.3IONS-SCNC: 10 MMOL/L (ref 9–17)
BASOPHILS # BLD: 0.1 K/UL (ref 0–0.2)
BASOPHILS NFR BLD: 2 % (ref 0–2)
BUN SERPL-MCNC: 6 MG/DL (ref 8–23)
CALCIUM SERPL-MCNC: 8.7 MG/DL (ref 8.6–10.4)
CHLORIDE SERPL-SCNC: 106 MMOL/L (ref 98–107)
CO2 SERPL-SCNC: 25 MMOL/L (ref 20–31)
CREAT SERPL-MCNC: 0.7 MG/DL (ref 0.5–0.9)
EOSINOPHIL # BLD: 0.6 K/UL (ref 0–0.4)
EOSINOPHILS RELATIVE PERCENT: 9 % (ref 0–4)
ERYTHROCYTE [DISTWIDTH] IN BLOOD BY AUTOMATED COUNT: 16.8 % (ref 11.5–14.9)
GFR, ESTIMATED: >90 ML/MIN/1.73M2
GLUCOSE BLD-MCNC: 158 MG/DL (ref 65–105)
GLUCOSE SERPL-MCNC: 174 MG/DL (ref 70–99)
HCT VFR BLD AUTO: 30.7 % (ref 36–46)
HGB BLD-MCNC: 9.6 G/DL (ref 12–16)
LYMPHOCYTES NFR BLD: 2.4 K/UL (ref 1–4.8)
LYMPHOCYTES RELATIVE PERCENT: 36 % (ref 24–44)
MAGNESIUM SERPL-MCNC: 1.7 MG/DL (ref 1.6–2.6)
MCH RBC QN AUTO: 27.3 PG (ref 26–34)
MCHC RBC AUTO-ENTMCNC: 31.1 G/DL (ref 31–37)
MCV RBC AUTO: 87.7 FL (ref 80–100)
MONOCYTES NFR BLD: 0.8 K/UL (ref 0.1–1.3)
MONOCYTES NFR BLD: 12 % (ref 1–7)
NEUTROPHILS NFR BLD: 41 % (ref 36–66)
NEUTS SEG NFR BLD: 2.8 K/UL (ref 1.3–9.1)
PLATELET # BLD AUTO: 396 K/UL (ref 150–450)
PMV BLD AUTO: 7.6 FL (ref 6–12)
POTASSIUM SERPL-SCNC: 3.6 MMOL/L (ref 3.7–5.3)
POTASSIUM SERPL-SCNC: 4.1 MMOL/L (ref 3.7–5.3)
RBC # BLD AUTO: 3.5 M/UL (ref 4–5.2)
SODIUM SERPL-SCNC: 141 MMOL/L (ref 135–144)
WBC OTHER # BLD: 6.7 K/UL (ref 3.5–11)

## 2024-06-12 PROCEDURE — 99232 SBSQ HOSP IP/OBS MODERATE 35: CPT | Performed by: INTERNAL MEDICINE

## 2024-06-12 PROCEDURE — 2060000000 HC ICU INTERMEDIATE R&B

## 2024-06-12 PROCEDURE — 6360000002 HC RX W HCPCS: Performed by: INTERNAL MEDICINE

## 2024-06-12 PROCEDURE — 97116 GAIT TRAINING THERAPY: CPT

## 2024-06-12 PROCEDURE — 6370000000 HC RX 637 (ALT 250 FOR IP): Performed by: INTERNAL MEDICINE

## 2024-06-12 PROCEDURE — 2580000003 HC RX 258: Performed by: INTERNAL MEDICINE

## 2024-06-12 PROCEDURE — 6370000000 HC RX 637 (ALT 250 FOR IP): Performed by: NURSE PRACTITIONER

## 2024-06-12 PROCEDURE — 2580000003 HC RX 258

## 2024-06-12 PROCEDURE — 6370000000 HC RX 637 (ALT 250 FOR IP)

## 2024-06-12 PROCEDURE — 99232 SBSQ HOSP IP/OBS MODERATE 35: CPT | Performed by: PHYSICAL MEDICINE & REHABILITATION

## 2024-06-12 PROCEDURE — 97129 THER IVNTJ 1ST 15 MIN: CPT

## 2024-06-12 PROCEDURE — 97110 THERAPEUTIC EXERCISES: CPT

## 2024-06-12 PROCEDURE — 84132 ASSAY OF SERUM POTASSIUM: CPT

## 2024-06-12 PROCEDURE — 83735 ASSAY OF MAGNESIUM: CPT

## 2024-06-12 PROCEDURE — 97130 THER IVNTJ EA ADDL 15 MIN: CPT

## 2024-06-12 PROCEDURE — 85025 COMPLETE CBC W/AUTO DIFF WBC: CPT

## 2024-06-12 PROCEDURE — 36415 COLL VENOUS BLD VENIPUNCTURE: CPT

## 2024-06-12 PROCEDURE — 80048 BASIC METABOLIC PNL TOTAL CA: CPT

## 2024-06-12 PROCEDURE — 97530 THERAPEUTIC ACTIVITIES: CPT

## 2024-06-12 PROCEDURE — 6360000002 HC RX W HCPCS

## 2024-06-12 PROCEDURE — 82947 ASSAY GLUCOSE BLOOD QUANT: CPT

## 2024-06-12 RX ORDER — AMLODIPINE BESYLATE 10 MG/1
10 TABLET ORAL EVERY EVENING
Status: DISCONTINUED | OUTPATIENT
Start: 2024-06-12 | End: 2024-06-13

## 2024-06-12 RX ORDER — AMLODIPINE BESYLATE 10 MG/1
10 TABLET ORAL DAILY
Status: DISCONTINUED | OUTPATIENT
Start: 2024-06-12 | End: 2024-06-12

## 2024-06-12 RX ORDER — INSULIN LISPRO 100 [IU]/ML
0-8 INJECTION, SOLUTION INTRAVENOUS; SUBCUTANEOUS
Status: DISCONTINUED | OUTPATIENT
Start: 2024-06-12 | End: 2024-06-14 | Stop reason: HOSPADM

## 2024-06-12 RX ORDER — POTASSIUM CHLORIDE 20 MEQ/1
40 TABLET, EXTENDED RELEASE ORAL ONCE
Status: DISCONTINUED | OUTPATIENT
Start: 2024-06-12 | End: 2024-06-12

## 2024-06-12 RX ORDER — HYDRALAZINE HYDROCHLORIDE 50 MG/1
50 TABLET, FILM COATED ORAL EVERY 6 HOURS SCHEDULED
Status: DISCONTINUED | OUTPATIENT
Start: 2024-06-12 | End: 2024-06-14 | Stop reason: HOSPADM

## 2024-06-12 RX ORDER — POTASSIUM CHLORIDE 20 MEQ/1
40 TABLET, EXTENDED RELEASE ORAL ONCE
Status: COMPLETED | OUTPATIENT
Start: 2024-06-12 | End: 2024-06-12

## 2024-06-12 RX ADMIN — SODIUM CHLORIDE, PRESERVATIVE FREE 10 ML: 5 INJECTION INTRAVENOUS at 22:10

## 2024-06-12 RX ADMIN — HYDRALAZINE HYDROCHLORIDE 50 MG: 50 TABLET ORAL at 04:25

## 2024-06-12 RX ADMIN — Medication 400 MG: at 09:11

## 2024-06-12 RX ADMIN — AMLODIPINE BESYLATE 10 MG: 10 TABLET ORAL at 11:30

## 2024-06-12 RX ADMIN — ENOXAPARIN SODIUM 30 MG: 100 INJECTION SUBCUTANEOUS at 09:13

## 2024-06-12 RX ADMIN — LABETALOL HYDROCHLORIDE 10 MG: 5 INJECTION, SOLUTION INTRAVENOUS at 04:25

## 2024-06-12 RX ADMIN — ROPINIROLE HYDROCHLORIDE 2 MG: 2 TABLET, FILM COATED ORAL at 09:11

## 2024-06-12 RX ADMIN — DICLOFENAC SODIUM 4 G: 10 GEL TOPICAL at 09:19

## 2024-06-12 RX ADMIN — ANTI-FUNGAL POWDER MICONAZOLE NITRATE TALC FREE: 1.42 POWDER TOPICAL at 22:04

## 2024-06-12 RX ADMIN — METOPROLOL 100 MG: 100 TABLET ORAL at 22:01

## 2024-06-12 RX ADMIN — ANTI-FUNGAL POWDER MICONAZOLE NITRATE TALC FREE: 1.42 POWDER TOPICAL at 09:18

## 2024-06-12 RX ADMIN — ENOXAPARIN SODIUM 30 MG: 100 INJECTION SUBCUTANEOUS at 21:57

## 2024-06-12 RX ADMIN — INSULIN GLARGINE 20 UNITS: 100 INJECTION, SOLUTION SUBCUTANEOUS at 09:13

## 2024-06-12 RX ADMIN — ROPINIROLE HYDROCHLORIDE 2 MG: 2 TABLET, FILM COATED ORAL at 21:57

## 2024-06-12 RX ADMIN — QUETIAPINE FUMARATE 50 MG: 50 TABLET ORAL at 21:57

## 2024-06-12 RX ADMIN — POTASSIUM CHLORIDE 40 MEQ: 1500 TABLET, EXTENDED RELEASE ORAL at 09:11

## 2024-06-12 RX ADMIN — BUSPIRONE HYDROCHLORIDE 10 MG: 10 TABLET ORAL at 21:57

## 2024-06-12 RX ADMIN — ACETAMINOPHEN 650 MG: 325 TABLET ORAL at 21:57

## 2024-06-12 RX ADMIN — CEFTRIAXONE SODIUM 2000 MG: 2 INJECTION, POWDER, FOR SOLUTION INTRAMUSCULAR; INTRAVENOUS at 04:00

## 2024-06-12 RX ADMIN — ATORVASTATIN CALCIUM 40 MG: 40 TABLET, FILM COATED ORAL at 21:57

## 2024-06-12 RX ADMIN — METOPROLOL 100 MG: 100 TABLET ORAL at 09:12

## 2024-06-12 RX ADMIN — ACETAMINOPHEN 650 MG: 325 TABLET ORAL at 09:12

## 2024-06-12 RX ADMIN — LATANOPROST 1 DROP: 50 SOLUTION OPHTHALMIC at 22:01

## 2024-06-12 RX ADMIN — VENLAFAXINE HYDROCHLORIDE 75 MG: 75 CAPSULE, EXTENDED RELEASE ORAL at 14:09

## 2024-06-12 RX ADMIN — BUSPIRONE HYDROCHLORIDE 10 MG: 10 TABLET ORAL at 09:12

## 2024-06-12 RX ADMIN — CEFTRIAXONE SODIUM 2000 MG: 2 INJECTION, POWDER, FOR SOLUTION INTRAMUSCULAR; INTRAVENOUS at 15:41

## 2024-06-12 RX ADMIN — LISINOPRIL 40 MG: 20 TABLET ORAL at 09:12

## 2024-06-12 RX ADMIN — BUSPIRONE HYDROCHLORIDE 10 MG: 10 TABLET ORAL at 14:10

## 2024-06-12 RX ADMIN — HYDRALAZINE HYDROCHLORIDE 50 MG: 50 TABLET ORAL at 17:28

## 2024-06-12 RX ADMIN — SODIUM CHLORIDE, PRESERVATIVE FREE 10 ML: 5 INJECTION INTRAVENOUS at 09:13

## 2024-06-12 RX ADMIN — HYDRALAZINE HYDROCHLORIDE 50 MG: 50 TABLET ORAL at 14:11

## 2024-06-12 RX ADMIN — ASPIRIN 81 MG: 81 TABLET, COATED ORAL at 09:11

## 2024-06-12 ASSESSMENT — ENCOUNTER SYMPTOMS
CONSTIPATION: 0
NAUSEA: 0
VOMITING: 0

## 2024-06-12 ASSESSMENT — PAIN SCALES - GENERAL
PAINLEVEL_OUTOF10: 8
PAINLEVEL_OUTOF10: 1
PAINLEVEL_OUTOF10: 0
PAINLEVEL_OUTOF10: 7
PAINLEVEL_OUTOF10: 0

## 2024-06-12 ASSESSMENT — PAIN DESCRIPTION - LOCATION
LOCATION: HEAD
LOCATION: BACK
LOCATION: HEAD
LOCATION: HEAD

## 2024-06-12 ASSESSMENT — PAIN DESCRIPTION - DESCRIPTORS
DESCRIPTORS: ACHING

## 2024-06-12 ASSESSMENT — PAIN DESCRIPTION - ONSET: ONSET: ON-GOING

## 2024-06-12 ASSESSMENT — PAIN DESCRIPTION - PAIN TYPE: TYPE: ACUTE PAIN

## 2024-06-12 ASSESSMENT — PAIN DESCRIPTION - FREQUENCY: FREQUENCY: CONTINUOUS

## 2024-06-12 NOTE — PROGRESS NOTES
Rehabilitation Physical Therapy    Date: 2024  Patient Name: Kavya De Santiago      Room:   MRN: [id number]    : 1960  (64 y.o.)  Gender: female      Diagnosis: Severe diarrhea, Acute encephalopathy, pancolitis  Additional Pertinent Hx: Ms. Kavya De Santiago is a 64 y.o. female who was admitted to [facility] on 2024 with Loss of Consciousness     64-year-old female with history of type 2 diabetes, hypertension hyperlipidemia, CKD, GERD, DIEGO, obesity admitted with change in mental status, nausea vomiting diarrhea.  She required intubation reportedly patient had some upper respiratory symptoms over the past week with dry cough decreased appetite rhinorrhea congestion headache and sinus pain.  She then began to have some diarrhea and vomiting..  EMS noted GCS of 89 decreased O2 sat.  She was intubated CT chest showed no PE CT abdomen pelvis showed pancolitis with abnormal wall thickening of the ascending transverse descending colon sigmoid colon and rectum she was started antibiotic she had elevated glucose influenza was negative     Critical care -acute hypoxic respite failure intubated  extubated , sepsis with H influenza bacteremia and meningitis question colitis    Discharge Recommendations:  Therapy recommended at discharge       Assessment  Assessment  Activity Tolerance: Patient limited by fatigue;Patient limited by endurance;Patient limited by pain (headache, Educated pt on the importance of working with therapy every single day and that pt will be going to Acute Rehab which is 3 hours of therapy every day, 7 days a week. Pt needs extra encouragement for participation.)  Discharge Recommendations: Therapy recommended at discharge    Plan  Physical Therapy Plan  General Plan: 5-7 times per week  Specific Instructions for Next Treatment: attempt gait  Current Treatment Recommendations: Strengthening, ROM, Balance training, Functional mobility training, Transfer training,

## 2024-06-12 NOTE — PROGRESS NOTES
SPEECH LANGUAGE PATHOLOGY  Speech Language Pathology  ST PROGRESSIVE CARE    Cognitive Treatment Note    Date: 6/12/2024  Patient’s Name: aKvya De Santiago  MRN: [id number]  Diagnosis:   Patient Active Problem List   Diagnosis Code    Cervical spondylosis M47.812    Type 2 diabetes mellitus without complication, without long-term current use of insulin ([facility]) E11.9    Hypertension I10    Abnormal auditory perception H93.299    Nasal polyps J33.9    Osteoarthritis of left knee M17.12    Osteoarthritis of right knee M17.11    DIEGO (nonalcoholic steatohepatitis) K75.81    Vitamin D deficiency E55.9    Iron deficiency anemia D50.9    Dyslipidemia E78.5    Trochanteric bursitis M70.60    Chronic left shoulder pain M25.512, G89.29    Restless legs syndrome G25.81    Generalized anxiety disorder F41.1    Acute encephalopathy G93.40    Class 2 obesity in adult E66.9    Leg edema R60.0    Recurrent major depressive disorder ([facility]) F33.9    Dermatitis L30.9    Retrolisthesis of vertebrae M43.10    Bilateral carpal tunnel syndrome G56.03    Intention tremor G25.2    Sciatica M54.30    Back pain with radiation M54.9    Left sciatic nerve pain M54.32    Lumbar disc disease with radiculopathy M51.16    Intractable back pain M54.9    Piriformis syndrome of left side G57.02    Ulnar neuropathy at elbow, left G56.22    S/P decompression of ulnar nerve at elbow Z98.890    Cervical myelopathy with cervical radiculopathy ([facility]) G95.9, M54.12    Lumbar spondylosis M47.816    Right median nerve neuropathy G56.11    Sacroiliitis ([facility]) M46.1    Secondary diabetes mellitus with stage 2 chronic kidney disease (GFR 60-89) ([facility]) E13.22, N18.2    Benign hypertension with CKD (chronic kidney disease) stage III ([facility]) I12.9, N18.30    Severe obesity (BMI 35.0-39.9) with comorbidity ([facility]) E66.01    Stenosis of lateral recess of lumbosacral spine M48.07    Type 2 diabetes mellitus with chronic kidney disease E11.22    Sacroiliac joint pain M53.3     Type 2 diabetes mellitus with hyperglycemia E11.65    Severe diarrhea K52.9    Pancolitis ([facility]) K51.00    Fever R50.9    Acute respiratory failure ([facility]) J96.00    Colitis K52.9    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    Bacteremia R78.81    Sepsis due to Haemophilus influenzae with acute respiratory failure without septic shock ([facility]) A41.3, R65.20, J96.00    Anemia D64.9    Elevated LFTs R79.89    Unresponsive state R41.89    Meningoencephalitis 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     Organization: abstract convergent categorization- 80%, 90% c cues.      Problem Solving/Reasoning: missing equipment- 90%.      Other: family not present. ST utilized pocket talker to improve pt. Hearing of stimuli, resulted in pt. Reports and demo. Of increased comprehension and hearing accuracy.    Pt. Expressed appreciation for use of pocket talker.     Plan:  [x] Continue ST services    [] Discharge from ST:       Discharge recommendations:  [x] Further therapy recommended at discharge.   [] No therapy recommended at discharge.      Treatment completed by: Swathi Modi M.A.CCC/SLP

## 2024-06-12 NOTE — PROGRESS NOTES
[facility] -   Infectious diseases evaluation  admission date 5/26/2024    reason for consultation:   High fever, unknown etiology sepsis    Impression :   Current:  Haemophilus influenza meningitis/ bacteremia  Fever/leukocytosis resolved  Possible mastoiditis and sinusitis on CT head  Acute /subacute infarct in the left posterior frontal lobe   Sepsis   Colitis  Diabetes mellitus  Chronic kidney disease  Hyperlipidemia  Hypertension  GERD  Pagan      Discussion:  Status post lumbar puncture 5/29, cloudy fluid, 280 WBC, meningitis panel was positive for haemophilus influenza.      HENCE:   Continue Ceftriaxone 2 gm IV every 12 hours, may change the dose to 2 g every 24 on discharge through 7/10/2024 to finish 6 weeks course for possible  mastoiditis.  ENT evaluation as outpatient  Repeat blood cultures from 6/6/2024 no growth  Respiratory panel with COVID-19 - 6/6/2024  UA 6/6 showed 3-5 WBC, trace leukocyte esterase, many yeast  Chest x-ray from 6/6 showed no acute process  Procalcitonin level 0.18 on 6/6  No obvious vegetations per 2 d echo.   EMANUEL 6/7/2024 showed no vegetations  Follow CBC and renal function closely.  No growth on blood cultures from 5/28/2024.  Supportive care.        Infection Control Recommendations   [place] Precautions      Antimicrobial Stewardship Recommendations   Simplification of therapy  Targeted therapy      History of Present Illness:   Initial history:  Kavya De Santiago is a 64 y.o.-year-old female was brought to the hospital for altered mental status associated with nausea, vomiting and diarrhea.  The patient is intubated, unable to provide history that was obtained from chart review and  at the bedside had upper respiratory symptoms, congestion, headache and rhinorrhea for 1 week associated with dry cough and decreased appetite.  The patient was found on the ground on the day of admission, EMS called.  The patient was  last 72 hours.  No results for input(s): \"BC\" in the last 72 hours.  Lab Results   Component Value Date/Time    CREATININE 0.7 06/12/2024 05:05 AM    GLUCOSE 174 06/12/2024 05:05 AM    GLUCOSE 137 05/19/2012 12:05 PM       Detailed results:        Thank you for allowing us to participate in the care of this patient.Please call with questions.    This note is created with the assistance of a speech recognition program.  While intending to generate adocument that actually reflects the content of the visit, the document can still have some errors including those of syntax and sound a like substitutions which may escape proof reading.  It such instances, actual meaningcan be extrapolated by contextual diversion.    Joel Harris MD  Office: [phone]  Perfect serve / office [phone]

## 2024-06-12 NOTE — PROGRESS NOTES
Patient is seen and examined at bedside again, patient was complaining of headache, although she was not complaining of headache during the morning, she mentioned to me that she thinks is because of sleep deprivation, and being in the hospital she is getting these headaches, she received Tylenol for it, headache is improving, no new onset weakness on the examination, NIH score 0.  On examination:  Bilateral upper and lower extremity power 5 x 5  Reflexes intact, pupils bilateral reactive  No visible facial drooping    We have also discontinued all the as needed IV medications, patient started on amlodipine 10 mg for blood pressure control, magnesium 1.9, magnesium 400 mg supplementation daily added, in case of any electrolyte abnormality, like hypokalemia patient can have oral supplementation.    Cristobal Ferrer   resident.  PGY1.

## 2024-06-12 NOTE — FLOWSHEET NOTE
06/12/24 1617 06/12/24 1715   Vital Signs   BP (!) 170/77 (!) 162/102   MAP (Calculated) 108 122   MAP (mmHg) 104  --    BP Location Left lower arm Left upper arm   BP Method Automatic Manual       Dr campbell notified of the above bps which were still running high even hours after new medication Norvasc was given.   New order to edit hydralazine from q8h to q6h starting now. Mentioned maybe getting something for anxiety on.     Residents notified of dr cambpell thoughts on possibly adding an anxiety med. Reviewed that she seems calm to me and they stated they also assessed her and do not feel she needs added anxiety meds but will keep monitoring and add in future if needed.

## 2024-06-12 NOTE — PROGRESS NOTES
[facility]   INPATIENT OCCUPATIONAL THERAPY  PROGRESS NOTE  Date: 2024  Patient Name: Kavya De Santiago       Room:   MRN: [id number]    : 1960  (64 y.o.)  Gender: female      Diagnosis: Colitis      Discharge Recommendations:  Further Occupational Therapy is recommended upon facility discharge.    OT Equipment Recommendations  Other: TBD    Restrictions/Precautions  Restrictions/Precautions  Restrictions/Precautions: Fall Risk;General Precautions  Required Braces or Orthoses?: No    O2 Device: None (Room air)   Subjective  Subjective  Subjective: Pt pleasant and agreeable to co-tx with Daksha MARGO  Subjective  Subjective: Pt reports headache limiting standing participation req encourgament   Pain: pt reports 8/10 headache Comments: LEELA Beavers'raj treatment    Objective  Orientation  Overall Orientation Status: Within Functional Limits  Orientation Level: Oriented to person;Oriented to place;Oriented to time;Oriented to situation  Cognition  Overall Cognitive Status: Exceptions  Arousal/Alertness: Delayed responses to stimuli;Inconsistent responses to stimuli  Following Commands: Follows one step commands with increased time;Follows one step commands with repetition;Inconsistently follows commands  Attention Span: Difficulty attending to directions  Memory: Decreased short term memory;Decreased recall of recent events  Safety Judgement: Impaired;Decreased awareness of need for assistance;Decreased awareness of need for safety  Problem Solving: Impaired;Assistance required to generate solutions;Assistance required to implement solutions;Assistance required to identify errors made;Assistance required to correct errors made;Decreased awareness of errors  Insights: Decreased awareness of deficits  Initiation: Requires cues for all  Sequencing: Requires cues for all  Cognition Comment: Hard of Hearing from the Meningitis, impulsive tendencies without warning.    Activities  20s and 2 min 30s with seated RB d/t fatigue and headache. Pt demo's widened RICA during dynamic activities however no LOB noted    Patient Education  Patient Education  Education Given To: Patient  Education Provided: Role of Therapy, Plan of Care  Education Method: Demonstration, Verbal  Barriers to Learning: Cognition, Hearing  Education Outcome: Continued education needed    Goals  Short Term Goals  Time Frame for Short Term Goals: By discharge  Short Term Goal 1: Pt will complete bed mobility with Mod A to sitting EOB for 15+ mintues during functional activity of choice unsupported with SBA  Short Term Goal 2: Pt will complete self feeding task/grooming with set-up A and Good attention to task  Short Term Goal 3: Pt will complete upper body dressing/bathing with SBA and Good safety/attention to task  Short Term Goal 4: Pt will complete lower body dressing/bathing with Max A x 1 and Good safety with use of AE as needed  Short Term Goal 5: Pt will complete functional transfers/mobility during self care tasks with Mod A and Good safety with use of least restrictive device  Short Term Goal 6: Pt will follow simple 2-3 step commands 80% of the time to increase participation in daily activities  Short Term Goal 7: Pt will participate in 15+ minutes of therapeutic exercises/functional activities to increase safety and independence with self care and mobility  Occupational Therapy Plan  Times Per Week: 5-7  Current Treatment Recommendations: Self-Care / ADL, Strengthening, ROM, Balance training, Functional mobility training, Endurance training, Cognitive reorientation, Pain management, Safety education & training, Patient/Caregiver education & training, Equipment evaluation, education, & procurement, Home management training, Cognitive/Perceptual training, Coordination training, Co-Treatment    Assessment  Activity Tolerance  Activity Tolerance: Patient limited by pain, Patient limited by fatigue  Activity Tolerance

## 2024-06-12 NOTE — PROGRESS NOTES
fluid collection.  The gray-white differentiation is maintained without evidence of an acute infarct.  There is no evidence of hydrocephalus. ORBITS: The visualized portion of the orbits demonstrate no acute abnormality. SINUSES: The visualized paranasal sinuses and mastoid air cells demonstrate no acute abnormality. SOFT TISSUES/SKULL:  No acute abnormality of the visualized skull or soft tissues.     No acute intracranial abnormality.     XR CHEST PORTABLE    Result Date: 5/26/2024  EXAMINATION: ONE XRAY VIEW OF THE CHEST 5/26/2024 9:10 am COMPARISON: October 4, 2022 HISTORY: ORDERING SYSTEM PROVIDED HISTORY: ETT/OG confirm TECHNOLOGIST PROVIDED HISTORY: ETT/OG confirm FINDINGS: Endotracheal tube approximately 2.6 cm above the mason.  Enteric tube traverses the mediastinum with tip and side port below the inferior margins of this exam at the upper abdomen.  Overall low lung volumes.  Left basilar atelectasis/scarring versus infiltrate and/or small effusion.  No pneumothorax.  Cardiac and mediastinal silhouettes unremarkable.  No acute osseous abnormality.  Telemetry leads overlie the chest.     1. Support devices as above. 2. Left basilar atelectasis/scarring versus infiltrate and/or small effusion.         Physical Examination:        Physical Exam  Constitutional:       Appearance: She is obese.   HENT:      Right Ear: Ear canal and external ear normal.      Left Ear: Ear canal and external ear normal.      Ears:      Comments: Bilateral hearing loss  Cardiovascular:      Rate and Rhythm: Normal rate and regular rhythm.   Pulmonary:      Effort: Pulmonary effort is normal.      Breath sounds: No wheezing.   Abdominal:      General: Bowel sounds are normal. There is no distension.      Palpations: Abdomen is soft.      Tenderness: There is no guarding.   Musculoskeletal:      Right lower leg: No edema.      Left lower leg: No edema.   Neurological:      Mental Status: She is alert.           Assessment:         Primary Problem  Severe diarrhea    Active Hospital Problems    Diagnosis Date Noted    Acute encephalopathy [G93.40] 06/06/2022     Priority: Medium    Cerebrovascular accident (CVA) (HCC) [I63.9] 06/04/2024    Haemophilus influenzae meningitis [G00.0] 06/01/2024    Bacteremia [R78.81] 06/01/2024    Sepsis due to Haemophilus influenzae with acute respiratory failure without septic shock (HCC) [A41.3, R65.20, J96.00] 06/01/2024    Anemia [D64.9] 06/01/2024    Elevated LFTs [R79.89] 06/01/2024    Unresponsive state [R41.89] 06/01/2024    Meningoencephalitis [G04.90] 06/01/2024    History of TIA (transient ischemic attack) [Z86.73] 06/01/2024    Colitis [K52.9] 05/31/2024    Pancolitis (HCC) [K51.00] 05/27/2024    Fever [R50.9] 05/27/2024    Acute respiratory failure (HCC) [J96.00] 05/27/2024    Severe diarrhea [K52.9] 05/26/2024       Plan:        Patient 64-year-old extensive past medical history 5/26 for acute respiratory failure and diarrhea need to be intubated from 5/26 and extubated June 2.  Found to be septic with confirmatory testing for H influenza bacteremia and meningitis CSF fluid positive.  Scan of abdomen also showed colitis.  Patient started on IV ceftriaxone and oral vancomycin.  CT scan 6/6 showing mastoiditis and sinusitis.  Cardiac concern about endocarditis, EMANUEL showed no vegetation, ID on board, bump in WBC count to 12, she is on ceftriaxone, now WBC count wNL, afebrile for 4 days full so far blood cultures negative, UA and x-ray negative. Currently on Holter monitor for 30 days, patient is excepted in ARU for physical therapy for    #Asymptomatic V. Tach  - 30 runs of V Tach overnight during sleep, no symptomatic  -K 3.6, mg 1.5, both replaced, potassium this morning 4, magnesium 1.9  -Will follow cardiology recommendation of getting a stress test, and Holter monitor for the next 30 days       #Encephalopathy due to H. Influenzae Meningitis and bacteremia(improved)  -2 out of 2 blood  Clindamycin Pregnancy And Lactation Text: This medication can be used in pregnancy if certain situations. Clindamycin is also present in breast milk.

## 2024-06-12 NOTE — PROGRESS NOTES
sodium  4 g Topical BID    magnesium oxide  400 mg Oral Daily    hydrALAZINE  50 mg Oral 3 times per day    QUEtiapine  50 mg Oral Nightly    metoprolol tartrate  100 mg Oral BID    lisinopril  40 mg Oral Daily    busPIRone  10 mg Per NG tube TID    insulin glargine  20 Units SubCUTAneous Daily    miconazole   Topical BID    enoxaparin  30 mg SubCUTAneous BID    rOPINIRole  2 mg Oral BID    insulin lispro  0-8 Units SubCUTAneous Q4H    aspirin  81 mg Oral Daily    atorvastatin  40 mg Oral Nightly    latanoprost  1 drop Both Eyes Nightly    [Held by provider] venlafaxine  75 mg Oral Daily    sodium chloride flush  5-40 mL IntraVENous 2 times per day    sodium chloride  500 mL IntraVENous Once     Continuous Infusions:   sodium chloride      dextrose       PRN Meds:.sodium chloride flush, bisacodyl, hydrALAZINE, potassium chloride, sodium chloride flush, sodium chloride flush, sodium chloride flush, sodium chloride flush, sodium chloride, potassium chloride **OR** potassium alternative oral replacement **OR** potassium chloride, magnesium sulfate, ondansetron **OR** ondansetron, polyethylene glycol, acetaminophen **OR** acetaminophen, glucose, dextrose bolus **OR** dextrose bolus, glucagon (rDNA), dextrose, labetalol     Diagnostics:     CBC:   Recent Labs     06/10/24  0533 06/11/24  0529 06/12/24  0505   WBC 7.1 6.7 6.7   RBC 3.37* 3.17* 3.50*   HGB 9.3* 8.9* 9.6*   HCT 29.7* 27.8* 30.7*   MCV 87.9 87.6 87.7   RDW 15.6* 15.3* 16.8*    376 396       BMP:   Recent Labs     06/09/24  2247 06/10/24  0533 06/10/24  1250 06/11/24  0529 06/12/24  0505    147*  --  144 141   K 3.3* 3.6* 4.4 4.0 3.6*   * 111*  --  110* 106   CO2 23 24  --  27 25   PHOS 3.1  --   --   --   --    BUN 8 6*  --  8 6*   CREATININE 0.7 0.8  --  0.7 0.7       BNP: No results for input(s): \"BNP\" in the last 72 hours.  PT/INR: No results for input(s): \"PROTIME\", \"INR\" in the last 72 hours.  APTT: No results for input(s): \"APTT\" in

## 2024-06-12 NOTE — PROGRESS NOTES
Rehab Dr Moran rounded and stated they could potentially take pt but wanted her new headache that started yesterday addressed given her hx.  Also stated they are unable to give iv mag, IV K, or IV bp meds in ARU as well.     IV labetalol was last given yesterday at 526 am (bp 163/67) and today at 425 am (bp 172/74).     IV hydralazine has not been utilized since 6/9.   IV mag given last on 6/9 (Mag 1.9 yesterday).  IV potassium given last on 6/6 (K 3.6 today, given 40 mEq PO).     For BP, pt is on scheduled hydralazine 50 mg q8h, lisinopril 40 mg daily, and metoprolol 100 mg BID.     Attempted to call residents to discuss this but there was no answer x3.   Attempted to call Dr Zelaya to discuss bp trends, no answer, VM left with call back number for me.      1124 am - reviewed the above face to face with dr Zelaya. New order to dc labetalol and start Norvasc 10 mg daily, first dose now and continue every evening after starting tomorrow. Recheck K at 2 pm.

## 2024-06-12 NOTE — PROGRESS NOTES
Date:   6/12/2024  Patient name: Kavya De Santiago  Date of admission:  5/26/2024  8:51 AM  MRN:   [id number]  YOB: 1960  PCP: Shaina Hamilton MD    Reason for Admission: Colitis [K52.9]    Cardiology follow-up: Positive blood culture, Haemophilus influenzae bacteremia, negative transesophageal echo examination, nonsustained ventricular tachycardia        Referring physician: Dr. Joel Harris                            Impression     1.Admission 5/26/2024 acute hypoxic respiratory failure, intubated 5/26/2020 mostly for fever and altered mental status, extubated 6/2/2024  2.Septic shock with haemophilus influenza bacteremia and meningitis CSF fluid positive by molecular panel for H. 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  Mastoiditis/sinusitis on most recent CT of the head 6/6/2024  5. Type 2 diabetes  6.  Normal LV function 2D echo 5/28/2024 RVSP 52 mm Hg  7.  Overweight/KVNG/history of snoring  8.  Chronic back pain has been on opiates, gabapentin, Lyrica  9.  History of hypertension  10.  Gastroesophageal reflux disease  11.DIEGO  12.  Anemia normal MCV  13.  Episodes of nonsustained V. Tach/polymorphic VT 9:15 PM 6/9/2024, episode of 6 beats of V. tach and 7 beats supraventricular tachycardia at 9:30 AM 6/10/2024, serum magnesium was 1.5 and potassium 3.3 prior to polymorphic V. Tach  14: Lexiscan Myoview stress test no ischemia ejection fraction 69%    Investigation workup     Lexiscan Myoview stress test 6/11/2024  No ischemia no 3 times daily ejection fraction 69%     ECG 6/5/2024  Sinus rhythm heart rate 60, low voltage QRS, cannot rule out anterior infarct, left axis, artifact  ECG 6/10/2024  Sinus rhythm heart rate 60, low voltage, poor R wave progression, T wave inversion lead III and aVF, no significant change from previous ECG    2D  echo 5/28/2024  Normal LV size mild LVH normal wall motion ejection fraction 65 to 70%  Mild tricuspid regurgitation right ventricle systolic pressure 52 mmHg    CTA head without contrast 6/6/2024  No acute intracranial process identified  Fluid opacification of the middle ears and mastoid air cells  Blood level within the frontal sinuses suggesting acute frontal sinusitis    CTA chest 5/26/2024  No evidence of pulmonary embolism  Prominent main pulmonary artery which could indicate underlying pulmonary arterial hypertension  Hepatic asteatosis     History of present illness  64-year-old  female with a past medical history of hypertension, overweight, diabetes mellitus, chronic pain got hospitalized on 5/26/2024 with altered mental status associated with hypoxic respiratory failure, nausea vomiting and diarrhea.  She got intubated in the ER on arrival.  Prior to admission she developed upper respiratory symptoms and congestion and headache about 1 week before admission.  On the day of admission she was found on the ground, EMS was called.  Patient was initially hypothermic and then she developed high-grade fever maximum of 105 she became hypotensive required pressure agent.  CT head showed no acute abnormality, CTA chest showed no evidence of pulmonary embolism.  CT abdomen pelvis showed pancolitis with abnormal wall thickening of ascending, transverse, descending, sigmoid colon and rectum.  Initial lactic acid was 3.3 WBC 11.5 procalcitonin 11.4.  COVID-19 and influenza test were negative.  Blood cultures grew Haemophilus influenzae.     Because of positive blood culture and abnormal CT for acute to subacute infarct in the left posterior frontal I was asked to see patient and perform EMANUEL     I saw patient couple of days ago.  I arranged for EMANUEL but patient developed marked altered mental status and she was tachypneic and EMANUEL was postponed     I performed EMANUEL 6-7-2024 and it did not show any evidence of

## 2024-06-12 NOTE — PROGRESS NOTES
RN to call before D/C for holter monitor placement.    [facility]   [address] [place] [address]  Phone: [phone] Fax: [phone]  PROGRESS NOTE  Patient Name: Octavio Medina : 1970   Treatment/Medical Diagnosis: Left shoulder pain [M25.512]   Referral Source: Mark Sorenson MD Payor: Payor: [employer] / Plan: [employer] PLUS PPO / Product Type: *No Product type* /    Date of Initial Visit: 23 Attended Visits: 27 Missed Visits: 0     SUMMARY OF TREATMENT  Octavio Medina is a 53 y.o.  yo male with Dx of Left shoulder pain [M25.512].  Treatment has consisted of therapeutic exercise, therapeutic activity, neuromuscular re-education, self care education and physical agent/modality to improve left shoulder pain and function.    CURRENT STATUS  Patient has attended 27 of 27 scheduled sessions from 24-24 with fair progress toward goals. Patient continues to be limited in overhead reach and lifting. Persistent capsular stiffness and poor mechanics noted with shoulder hike during overhead reach. Decreased compliance with HEP due to work schedule.    Subjective:  Reported Improvement: 60%  Pain: Average 2/10   Best 0/10   Worst 8/10  Reported Functional Deficits: anything overhead, work activities - laser treatments, holding lens, tying surgical mask,  rec activities - biking, kayaking    Reported Functional Improvements: lifting and reach below shoulder height    Objective:    Range of Motion:  Left Shoulder ROM PROM AROM   Flexion 131 Degrees 86 Degrees   Abduction 120 Degrees 103 Degrees   External Rotation 54 Degrees 42 Degrees   Internal Rotation (behind back) NT T10    *noted compensatory shoulder hike with all overhead range*    Strength:  Left shoulder strength 3/5 due to AROM limitations; noted strength for flex, abd ER 4-/5 in available range    GOAL ASSESSMENT   -Patient will be independent with prescribed HEP           Status at PN: patient reports

## 2024-06-12 NOTE — PROGRESS NOTES
Facts: Pt is known to writer and had asked writer to visit.    Feelings: Pt is concerned as she has lost her hearing after infection and isn't sure if it will return. The days she spent in ICU and on the vent have caused confusion and uncertainty about whether certain events actually happened or not.     Family: Family is supportive;  Rj was present for part of our visit.     Radhika: Pt is active in her Voodoo; pt's radhika is foundational for coping.     Follow-up: Chaplains remain available for pt support and spiritual care.      06/12/24 1502   Encounter Summary   Encounter Overview/Reason Spiritual/Emotional Needs   Service Provided For Patient and family together   Referral/Consult From Patient   Support System Spouse;Children;Jain/radhika community   Last Encounter  06/12/24   Complexity of Encounter Moderate   Begin Time 1345   End Time  1400   Total Time Calculated 15 min   Spiritual/Emotional needs   Type Spiritual Support;Emotional Distress   Grief, Loss, and Adjustments   Type New Diagnosis   Assessment/Intervention/Outcome   Assessment Compromised coping;Impaired resilience;Stress overload   Intervention Active listening;Discussed illness injury and it’s impact;Explored/Affirmed feelings, thoughts, concerns;Explored Coping Skills/Resources;Prayer (assurance of)/[place];Sustaining Presence/Ministry of presence   Outcome Engaged in conversation;Expressed feelings, needs, and concerns;Expressed Gratitude;Receptive

## 2024-06-12 NOTE — PLAN OF CARE
Problem: Safety - Adult  Goal: Free from fall injury  6/12/2024 0011 by Katarina Darden RN  Outcome: Progressing     Problem: Discharge Planning  Goal: Discharge to home or other facility with appropriate resources  6/12/2024 0011 by Katarina Darden RN  Outcome: Progressing     Problem: Pain  Goal: Verbalizes/displays adequate comfort level or baseline comfort level  6/12/2024 0011 by Katarina Darden RN  Outcome: Progressing     Problem: Skin/Tissue Integrity  Goal: Absence of new skin breakdown  Description: 1.  Monitor for areas of redness and/or skin breakdown  2.  Assess vascular access sites hourly  3.  Every 4-6 hours minimum:  Change oxygen saturation probe site  4.  Every 4-6 hours:  If on nasal continuous positive airway pressure, respiratory therapy assess nares and determine need for appliance change or resting period.  6/12/2024 0011 by Katarina Darden RN  Outcome: Progressing     Problem: ABCDS Injury Assessment  Goal: Absence of physical injury  6/12/2024 0011 by Katarina Darden RN  Outcome: Progressing     Problem: Cardiovascular - Adult  Goal: Maintains optimal cardiac output and hemodynamic stability  6/12/2024 0011 by Katarina Darden RN  Outcome: Progressing     Problem: Cardiovascular - Adult  Goal: Absence of cardiac dysrhythmias or at baseline  6/12/2024 0011 by Katarina Darden RN  Outcome: Progressing     Problem: Skin/Tissue Integrity - Adult  Goal: Skin integrity remains intact  6/12/2024 0011 by Katarina Darden RN  Outcome: Progressing       Problem: Gastrointestinal - Adult  Goal: Minimal or absence of nausea and vomiting  6/12/2024 0011 by Katarina Darden RN  Outcome: Progressing       Problem: Chronic Conditions and Co-morbidities  Goal: Patient's chronic conditions and co-morbidity symptoms are monitored and maintained or improved  6/12/2024 0011 by Katarina Darden RN  Outcome: Progressing     Problem: Hematologic - Adult  Goal: Maintains hematologic stability  6/12/2024 0011 by Katarina Darden RN  Outcome:

## 2024-06-12 NOTE — CARE COORDINATION
Message from [facility] in ARU. They may be able to accept patient today, however they are not able to accommodate IV BP medications, IV magnesium or IV potassium. Will need to clear with cardio

## 2024-06-13 ENCOUNTER — APPOINTMENT (OUTPATIENT)
Age: 64
End: 2024-06-13
Attending: PSYCHIATRY & NEUROLOGY
Payer: COMMERCIAL

## 2024-06-13 ENCOUNTER — APPOINTMENT (OUTPATIENT)
Dept: CT IMAGING | Age: 64
End: 2024-06-13
Payer: COMMERCIAL

## 2024-06-13 LAB
ANION GAP SERPL CALCULATED.3IONS-SCNC: 11 MMOL/L (ref 9–17)
BASOPHILS # BLD: 0.1 K/UL (ref 0–0.2)
BASOPHILS NFR BLD: 1 % (ref 0–2)
BUN SERPL-MCNC: 5 MG/DL (ref 8–23)
CALCIUM SERPL-MCNC: 8.5 MG/DL (ref 8.6–10.4)
CHLORIDE SERPL-SCNC: 105 MMOL/L (ref 98–107)
CO2 SERPL-SCNC: 24 MMOL/L (ref 20–31)
CREAT SERPL-MCNC: 0.7 MG/DL (ref 0.5–0.9)
EOSINOPHIL # BLD: 0.4 K/UL (ref 0–0.4)
EOSINOPHILS RELATIVE PERCENT: 8 % (ref 0–4)
ERYTHROCYTE [DISTWIDTH] IN BLOOD BY AUTOMATED COUNT: 16.1 % (ref 11.5–14.9)
GFR, ESTIMATED: >90 ML/MIN/1.73M2
GLUCOSE SERPL-MCNC: 222 MG/DL (ref 70–99)
HCT VFR BLD AUTO: 28.4 % (ref 36–46)
HGB BLD-MCNC: 8.9 G/DL (ref 12–16)
LYMPHOCYTES NFR BLD: 1.5 K/UL (ref 1–4.8)
LYMPHOCYTES RELATIVE PERCENT: 26 % (ref 24–44)
MCH RBC QN AUTO: 27.4 PG (ref 26–34)
MCHC RBC AUTO-ENTMCNC: 31.3 G/DL (ref 31–37)
MCV RBC AUTO: 87.6 FL (ref 80–100)
MONOCYTES NFR BLD: 0.8 K/UL (ref 0.1–1.3)
MONOCYTES NFR BLD: 13 % (ref 1–7)
NEUTROPHILS NFR BLD: 52 % (ref 36–66)
NEUTS SEG NFR BLD: 2.9 K/UL (ref 1.3–9.1)
PLATELET # BLD AUTO: 322 K/UL (ref 150–450)
PMV BLD AUTO: 7.9 FL (ref 6–12)
POTASSIUM SERPL-SCNC: 3.6 MMOL/L (ref 3.7–5.3)
RBC # BLD AUTO: 3.24 M/UL (ref 4–5.2)
SODIUM SERPL-SCNC: 140 MMOL/L (ref 135–144)
WBC OTHER # BLD: 5.6 K/UL (ref 3.5–11)

## 2024-06-13 PROCEDURE — 97530 THERAPEUTIC ACTIVITIES: CPT

## 2024-06-13 PROCEDURE — 6360000002 HC RX W HCPCS

## 2024-06-13 PROCEDURE — 2580000003 HC RX 258: Performed by: INTERNAL MEDICINE

## 2024-06-13 PROCEDURE — 93270 REMOTE 30 DAY ECG REV/REPORT: CPT

## 2024-06-13 PROCEDURE — 6360000002 HC RX W HCPCS: Performed by: INTERNAL MEDICINE

## 2024-06-13 PROCEDURE — 99233 SBSQ HOSP IP/OBS HIGH 50: CPT | Performed by: INTERNAL MEDICINE

## 2024-06-13 PROCEDURE — 2060000000 HC ICU INTERMEDIATE R&B

## 2024-06-13 PROCEDURE — 6370000000 HC RX 637 (ALT 250 FOR IP): Performed by: INTERNAL MEDICINE

## 2024-06-13 PROCEDURE — 99232 SBSQ HOSP IP/OBS MODERATE 35: CPT | Performed by: INTERNAL MEDICINE

## 2024-06-13 PROCEDURE — 85025 COMPLETE CBC W/AUTO DIFF WBC: CPT

## 2024-06-13 PROCEDURE — 97110 THERAPEUTIC EXERCISES: CPT

## 2024-06-13 PROCEDURE — 2580000003 HC RX 258

## 2024-06-13 PROCEDURE — 70450 CT HEAD/BRAIN W/O DYE: CPT

## 2024-06-13 PROCEDURE — 6370000000 HC RX 637 (ALT 250 FOR IP): Performed by: NURSE PRACTITIONER

## 2024-06-13 PROCEDURE — 97129 THER IVNTJ 1ST 15 MIN: CPT

## 2024-06-13 PROCEDURE — 6370000000 HC RX 637 (ALT 250 FOR IP)

## 2024-06-13 PROCEDURE — 97130 THER IVNTJ EA ADDL 15 MIN: CPT

## 2024-06-13 PROCEDURE — 97116 GAIT TRAINING THERAPY: CPT

## 2024-06-13 PROCEDURE — 80048 BASIC METABOLIC PNL TOTAL CA: CPT

## 2024-06-13 PROCEDURE — 97535 SELF CARE MNGMENT TRAINING: CPT

## 2024-06-13 RX ORDER — POTASSIUM CHLORIDE 20 MEQ/1
20 TABLET, EXTENDED RELEASE ORAL ONCE
Status: COMPLETED | OUTPATIENT
Start: 2024-06-13 | End: 2024-06-13

## 2024-06-13 RX ORDER — NIFEDIPINE 30 MG
30 TABLET, EXTENDED RELEASE ORAL NIGHTLY
Status: DISCONTINUED | OUTPATIENT
Start: 2024-06-13 | End: 2024-06-14 | Stop reason: HOSPADM

## 2024-06-13 RX ORDER — SPIRONOLACTONE 25 MG/1
25 TABLET ORAL DAILY
Status: DISCONTINUED | OUTPATIENT
Start: 2024-06-13 | End: 2024-06-14 | Stop reason: HOSPADM

## 2024-06-13 RX ADMIN — NIFEDIPINE 30 MG: 30 TABLET, EXTENDED RELEASE ORAL at 21:58

## 2024-06-13 RX ADMIN — ANTI-FUNGAL POWDER MICONAZOLE NITRATE TALC FREE: 1.42 POWDER TOPICAL at 08:30

## 2024-06-13 RX ADMIN — CEFTRIAXONE SODIUM 2000 MG: 2 INJECTION, POWDER, FOR SOLUTION INTRAMUSCULAR; INTRAVENOUS at 15:46

## 2024-06-13 RX ADMIN — QUETIAPINE FUMARATE 50 MG: 50 TABLET ORAL at 21:59

## 2024-06-13 RX ADMIN — HYDRALAZINE HYDROCHLORIDE 50 MG: 50 TABLET ORAL at 17:54

## 2024-06-13 RX ADMIN — DICLOFENAC SODIUM 4 G: 10 GEL TOPICAL at 08:27

## 2024-06-13 RX ADMIN — CEFTRIAXONE SODIUM 2000 MG: 2 INJECTION, POWDER, FOR SOLUTION INTRAMUSCULAR; INTRAVENOUS at 04:27

## 2024-06-13 RX ADMIN — BUSPIRONE HYDROCHLORIDE 10 MG: 10 TABLET ORAL at 21:59

## 2024-06-13 RX ADMIN — HYDRALAZINE HYDROCHLORIDE 50 MG: 50 TABLET ORAL at 05:24

## 2024-06-13 RX ADMIN — ACETAMINOPHEN 650 MG: 325 TABLET ORAL at 10:22

## 2024-06-13 RX ADMIN — ENOXAPARIN SODIUM 30 MG: 100 INJECTION SUBCUTANEOUS at 08:31

## 2024-06-13 RX ADMIN — ACETAMINOPHEN 650 MG: 325 TABLET ORAL at 05:24

## 2024-06-13 RX ADMIN — ROPINIROLE HYDROCHLORIDE 2 MG: 2 TABLET, FILM COATED ORAL at 21:58

## 2024-06-13 RX ADMIN — VENLAFAXINE HYDROCHLORIDE 75 MG: 75 CAPSULE, EXTENDED RELEASE ORAL at 08:26

## 2024-06-13 RX ADMIN — INSULIN GLARGINE 20 UNITS: 100 INJECTION, SOLUTION SUBCUTANEOUS at 08:24

## 2024-06-13 RX ADMIN — BUSPIRONE HYDROCHLORIDE 10 MG: 10 TABLET ORAL at 15:45

## 2024-06-13 RX ADMIN — ENOXAPARIN SODIUM 30 MG: 100 INJECTION SUBCUTANEOUS at 21:59

## 2024-06-13 RX ADMIN — LATANOPROST 1 DROP: 50 SOLUTION OPHTHALMIC at 22:08

## 2024-06-13 RX ADMIN — ANTI-FUNGAL POWDER MICONAZOLE NITRATE TALC FREE: 1.42 POWDER TOPICAL at 21:59

## 2024-06-13 RX ADMIN — INSULIN LISPRO 2 UNITS: 100 INJECTION, SOLUTION INTRAVENOUS; SUBCUTANEOUS at 11:51

## 2024-06-13 RX ADMIN — POTASSIUM CHLORIDE 20 MEQ: 1500 TABLET, EXTENDED RELEASE ORAL at 11:54

## 2024-06-13 RX ADMIN — ATORVASTATIN CALCIUM 40 MG: 40 TABLET, FILM COATED ORAL at 21:59

## 2024-06-13 RX ADMIN — DICLOFENAC SODIUM 4 G: 10 GEL TOPICAL at 22:06

## 2024-06-13 RX ADMIN — Medication 400 MG: at 08:25

## 2024-06-13 RX ADMIN — SODIUM CHLORIDE, PRESERVATIVE FREE 10 ML: 5 INJECTION INTRAVENOUS at 08:25

## 2024-06-13 RX ADMIN — SPIRONOLACTONE 25 MG: 25 TABLET ORAL at 17:54

## 2024-06-13 RX ADMIN — ROPINIROLE HYDROCHLORIDE 2 MG: 2 TABLET, FILM COATED ORAL at 08:25

## 2024-06-13 RX ADMIN — HYDRALAZINE HYDROCHLORIDE 50 MG: 50 TABLET ORAL at 00:00

## 2024-06-13 RX ADMIN — METOPROLOL 100 MG: 100 TABLET ORAL at 21:58

## 2024-06-13 RX ADMIN — ACETAMINOPHEN 650 MG: 325 TABLET ORAL at 21:59

## 2024-06-13 RX ADMIN — HYDRALAZINE HYDROCHLORIDE 50 MG: 50 TABLET ORAL at 23:54

## 2024-06-13 RX ADMIN — ASPIRIN 81 MG: 81 TABLET, COATED ORAL at 08:25

## 2024-06-13 RX ADMIN — HYDRALAZINE HYDROCHLORIDE 50 MG: 50 TABLET ORAL at 10:22

## 2024-06-13 RX ADMIN — METOPROLOL 100 MG: 100 TABLET ORAL at 08:25

## 2024-06-13 RX ADMIN — BUSPIRONE HYDROCHLORIDE 10 MG: 10 TABLET ORAL at 08:26

## 2024-06-13 RX ADMIN — LISINOPRIL 40 MG: 20 TABLET ORAL at 08:25

## 2024-06-13 ASSESSMENT — PAIN SCALES - GENERAL
PAINLEVEL_OUTOF10: 7
PAINLEVEL_OUTOF10: 6
PAINLEVEL_OUTOF10: 5
PAINLEVEL_OUTOF10: 0
PAINLEVEL_OUTOF10: 6

## 2024-06-13 ASSESSMENT — ENCOUNTER SYMPTOMS
WHEEZING: 0
CONSTIPATION: 0
DIARRHEA: 0
COUGH: 0
NAUSEA: 0
VOMITING: 0
SHORTNESS OF BREATH: 0
BACK PAIN: 0

## 2024-06-13 ASSESSMENT — PAIN DESCRIPTION - DESCRIPTORS
DESCRIPTORS: ACHING

## 2024-06-13 ASSESSMENT — PAIN DESCRIPTION - LOCATION
LOCATION: BACK;HEAD
LOCATION: HEAD
LOCATION: BACK
LOCATION: HEAD
LOCATION: HEAD

## 2024-06-13 ASSESSMENT — PAIN DESCRIPTION - FREQUENCY
FREQUENCY: INTERMITTENT
FREQUENCY: INTERMITTENT

## 2024-06-13 ASSESSMENT — PAIN DESCRIPTION - DIRECTION
RADIATING_TOWARDS: DENIES
RADIATING_TOWARDS: DENIES

## 2024-06-13 ASSESSMENT — PAIN DESCRIPTION - PAIN TYPE
TYPE: ACUTE PAIN
TYPE: ACUTE PAIN

## 2024-06-13 ASSESSMENT — PAIN DESCRIPTION - ORIENTATION
ORIENTATION: RIGHT
ORIENTATION: LOWER
ORIENTATION: LOWER
ORIENTATION: LEFT;RIGHT

## 2024-06-13 ASSESSMENT — PAIN DESCRIPTION - ONSET
ONSET: SUDDEN
ONSET: SUDDEN

## 2024-06-13 NOTE — CARE COORDINATION
MAX verbal cues to continue task.  Gait Deviations: Slow Kaelyn, Decreased step length, Decreased step height, Decreased head and trunk rotation, Shuffles  Distance: 2'    Current functional status for comprehension: Exceptions To Functional Limits    Current functional status for expression: Within Functional Limits    Current functional status for social interaction: Within Functional Limits    Current functional status for problem solving: Exceptions To Functional Limits    Current functional status for memory: Exceptions To Functional Limits    Current Deficits R/T Impairment: Impaired Functional Mobility and Decreased ADLs    Required Therapy Services:  Physical Therapy: Yes  Occupational Therapy: Yes  Speech Therapy: Yes      Additional Services:    [x] /Case Management    [] Recreational Therapy, as appropriate    [x] Nutrition Consult, as appropriate  [x] Dietary Needs/Preferences: Specialized Dietary Needs/Preferences indicated as follows: ADULT DIET; Regular; 4 carb choices (60 gm/meal)  [] Dialysis  [x] Cultural/Religion Needs/Preferences: Cultural/Religion Needs/Preferences indicated as follows: Adventism  [] Special Equipment Needs  [] Special Medication Needs  [] Other information relevant to patient's care needs:    Preferred Language: English    Does the patient need or want an  to communicate with a doctor or health care staff? No    Rehab Justification:  Needs 3 hrs therapy per day or 15 hours per week:  Yes  Identified Rehab Nursing needs: Yes  Intense Interdisciplinary need:  Yes  Need for 24 hr physician supervision:  Yes  Measurable improved quality of life:  Yes  Willingness to participate:  Yes  Medical Necessity:  Yes  Patient able to tolerate care proposed:  Yes    Expected Discharge Destination/Functional Level:  Home with assist    Expected length of time to achieve level of improvement: Approximately 2 Weeks  Please Note: Estimated length of stay is based on

## 2024-06-13 NOTE — PROGRESS NOTES
[facility] -   Infectious diseases evaluation  admission date 5/26/2024    reason for consultation:   High fever, unknown etiology sepsis    Impression :   Current:  Haemophilus influenza meningitis/ bacteremia  Fever/leukocytosis resolved  Possible mastoiditis and sinusitis on CT head  Acute /subacute infarct in the left posterior frontal lobe   Sepsis   Colitis  Diabetes mellitus  Chronic kidney disease  Hyperlipidemia  Hypertension  GERD  [place]agan      Discussion:  Status post lumbar puncture 5/29, cloudy fluid, 280 WBC, meningitis panel was positive for haemophilus influenza.      HENCE:   Continue Ceftriaxone 2 gm IV every 12 hours, may change the dose to 2 g every 24 on discharge through 7/10/2024 to finish 6 weeks course for possible  mastoiditis.  Repeat CT head pending  ENT evaluation as outpatient  Repeat blood cultures from 6/6/2024 no growth  Respiratory panel with COVID-19 - 6/6/2024  UA 6/6 showed 3-5 WBC, trace leukocyte esterase, many yeast  Chest x-ray from 6/6 showed no acute process  Procalcitonin level 0.18 on 6/6  No obvious vegetations per 2 d echo.   EMANUEL 6/7/2024 showed no vegetations  Follow CBC and renal function closely.  No growth on blood cultures from 5/28/2024.  Supportive care.        Infection Control Recommendations   [place] Precautions      Antimicrobial Stewardship Recommendations   Simplification of therapy  Targeted therapy      History of Present Illness:   Initial history:  Kavya De Santiago is a 64 y.o.-year-old female was brought to the hospital for altered mental status associated with nausea, vomiting and diarrhea.  The patient is intubated, unable to provide history that was obtained from chart review and  at the bedside had upper respiratory symptoms, congestion, headache and rhinorrhea for 1 week associated with dry cough and decreased appetite.  The patient was found on the ground on the day of admission, EMS  (gastroesophageal reflux disease)     History of recent hospitalization 2022    til 10/9/22 at [facility]    History of swelling of feet     lasix QOD for this    Hyperlipidemia     Hypertension     Left hand weakness     and pain, can not make fist d/t cubital tunnel issue    Lumbar radiculopathy 11/10/2021    was following with pain management and has had epidural injections    DIEGO (nonalcoholic steatohepatitis) 10/12/2014    Obesity     Osteoarthritis of left knee 2014    Recurrent major depressive disorder (HCC) 07/15/2022    Restless legs syndrome     Snores 2022    was seen by Dr. Asencio for likely sleep apnea while hospitalized , to follow up as OP for sleep study, has not been done    TIA (transient ischemic attack)     no residual symptoms    Type II or unspecified type diabetes mellitus without mention of complication, not stated as uncontrolled     Wears glasses     Wellness examination     PCP, Dr. Hamilton, last seen       Past Surgical  History:     Past Surgical History:   Procedure Laterality Date    CARPAL TUNNEL RELEASE Left 2022    CUBITAL TUNNEL DECOMPRESSION performed by Candice Harvey DO at [facility] OR     SECTION      x2    COLONOSCOPY  2012    ? polyps per pt    HYSTERECTOMY, TOTAL ABDOMINAL (CERVIX REMOVED)      with BSO    OTHER SURGICAL HISTORY Left 2022    cubital tunnel release    SHOULDER SURGERY Left 2021    SHOULDER MANIPULATION WITH PRE OP INTERSCALENE BLOCK and intraop injection performed by Ahsan Brown DO at [facility] OR    SPINE SURGERY Left 10/03/2022    S1 nerve root injection, Dr. Alston    SPINE SURGERY Left 10/06/2022    S1 NERVE ROOT INJECTION performed by Rema Alston MD at [facility] OR    TONSILLECTOMY      as child    UPPER GASTROINTESTINAL ENDOSCOPY  2012    [facility]       Medications:      NIFEdipine  30 mg Oral Nightly    insulin lispro  0-8 Units SubCUTAneous 4x Daily AC & HS    hydrALAZINE  50 mg

## 2024-06-13 NOTE — PROGRESS NOTES
artifact  ECG 6/10/2024  Sinus rhythm heart rate 60, low voltage, poor R wave progression, T wave inversion lead III and aVF, no significant change from previous ECG     2D echo 5/28/2024  Normal LV size mild LVH normal wall motion ejection fraction 65 to 70%  Mild tricuspid regurgitation right ventricle systolic pressure 52 mmHg     CTA head without contrast 6/6/2024  No acute intracranial process identified  Fluid opacification of the middle ears and mastoid air cells  Blood level within the frontal sinuses suggesting acute frontal sinusitis     CTA chest 5/26/2024  No evidence of pulmonary embolism  Prominent main pulmonary artery which could indicate underlying pulmonary arterial hypertension  Hepatic asteatosis     History of present illness  64-year-old  female with a past medical history of hypertension, overweight, diabetes mellitus, chronic pain got hospitalized on 5/26/2024 with altered mental status associated with hypoxic respiratory failure, nausea vomiting and diarrhea.  She got intubated in the ER on arrival.  Prior to admission she developed upper respiratory symptoms and congestion and headache about 1 week before admission.  On the day of admission she was found on the ground, EMS was called.  Patient was initially hypothermic and then she developed high-grade fever maximum of 105 she became hypotensive required pressure agent.  CT head showed no acute abnormality, CTA chest showed no evidence of pulmonary embolism.  CT abdomen pelvis showed pancolitis with abnormal wall thickening of ascending, transverse, descending, sigmoid colon and rectum.  Initial lactic acid was 3.3 WBC 11.5 procalcitonin 11.4.  COVID-19 and influenza test were negative.  Blood cultures grew Haemophilus influenzae.     Because of positive blood culture and abnormal CT for acute to subacute infarct in the left posterior frontal I was asked to see patient and perform EMANUEL     I saw patient couple of days ago.  I arranged  Hypertension uncontrolled multiple medication, low potassium most likely hyperaldosteronism  9: Episode of 30 beats polymorphic V. tach heart rate more than 250 on 6/9/2024 at 9:30 PM patient had potassium 3.3 and magnesium 1.5  Episodes of 6 beat V. tach and 8 beats supraventricular tachycardia on 6/10/2024 at 9:30 AM serum potassium was 3.6     No further episode of V. tach since potassium and magnesium levels are within normal    Patient Active Problem List:     Cervical spondylosis     Type 2 diabetes mellitus without complication, without long-term current use of insulin ([facility])     Hypertension     Abnormal auditory perception     Nasal polyps     Osteoarthritis of left knee     Osteoarthritis of right knee     DIEGO (nonalcoholic steatohepatitis)     Vitamin D deficiency     Iron deficiency anemia     Dyslipidemia     Trochanteric bursitis     Chronic left shoulder pain     Restless legs syndrome     Generalized anxiety disorder     Acute encephalopathy     Class 2 obesity in adult     Leg edema     Recurrent major depressive disorder ([facility])     Dermatitis     Retrolisthesis of vertebrae     Bilateral carpal tunnel syndrome     Intention tremor     Sciatica     Back pain with radiation     Left sciatic nerve pain     Lumbar disc disease with radiculopathy     Intractable back pain     Piriformis syndrome of left side     Ulnar neuropathy at elbow, left     S/P decompression of ulnar nerve at elbow     Cervical myelopathy with cervical radiculopathy ([facility])     Lumbar spondylosis     Right median nerve neuropathy     Sacroiliitis ([facility])     Secondary diabetes mellitus with stage 2 chronic kidney disease (GFR 60-89) ([facility])     Benign hypertension with CKD (chronic kidney disease) stage III ([facility])     Severe obesity (BMI 35.0-39.9) with comorbidity (HCC)     Stenosis of lateral recess of lumbosacral spine     Type 2 diabetes mellitus with chronic kidney disease     Sacroiliac joint pain     Type 2 diabetes mellitus with

## 2024-06-13 NOTE — PROGRESS NOTES
SPEECH LANGUAGE PATHOLOGY  Speech Language Pathology  ST PROGRESSIVE CARE    Cognitive Treatment Note    Date: 6/13/2024  Patient’s Name: Kavya De Santiago  MRN: [id number]  Diagnosis:   Patient Active Problem List   Diagnosis Code    Cervical spondylosis M47.812    Type 2 diabetes mellitus without complication, without long-term current use of insulin ([facility]) E11.9    Hypertension I10    Abnormal auditory perception H93.299    Nasal polyps J33.9    Osteoarthritis of left knee M17.12    Osteoarthritis of right knee M17.11    DIEGO (nonalcoholic steatohepatitis) K75.81    Vitamin D deficiency E55.9    Iron deficiency anemia D50.9    Dyslipidemia E78.5    Trochanteric bursitis M70.60    Chronic left shoulder pain M25.512, G89.29    Restless legs syndrome G25.81    Generalized anxiety disorder F41.1    Acute encephalopathy G93.40    Class 2 obesity in adult E66.9    Leg edema R60.0    Recurrent major depressive disorder ([facility]) F33.9    Dermatitis L30.9    Retrolisthesis of vertebrae M43.10    Bilateral carpal tunnel syndrome G56.03    Intention tremor G25.2    Sciatica M54.30    Back pain with radiation M54.9    Left sciatic nerve pain M54.32    Lumbar disc disease with radiculopathy M51.16    Intractable back pain M54.9    Piriformis syndrome of left side G57.02    Ulnar neuropathy at elbow, left G56.22    S/P decompression of ulnar nerve at elbow Z98.890    Cervical myelopathy with cervical radiculopathy ([facility]) G95.9, M54.12    Lumbar spondylosis M47.816    Right median nerve neuropathy G56.11    Sacroiliitis ([facility]) M46.1    Secondary diabetes mellitus with stage 2 chronic kidney disease (GFR 60-89) ([facility]) E13.22, N18.2    Benign hypertension with CKD (chronic kidney disease) stage III ([facility]) I12.9, N18.30    Severe obesity (BMI 35.0-39.9) with comorbidity ([facility]) E66.01    Stenosis of lateral recess of lumbosacral spine M48.07    Type 2 diabetes mellitus with chronic kidney disease E11.22    Sacroiliac joint pain M53.3     Type 2 diabetes mellitus with hyperglycemia E11.65    Severe diarrhea K52.9    Pancolitis ([facility]) K51.00    Fever R50.9    Acute respiratory failure ([facility]) J96.00    Colitis K52.9    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    Bacteremia R78.81    Sepsis due to Haemophilus influenzae with acute respiratory failure without septic shock ([facility]) A41.3, R65.20, J96.00    Anemia D64.9    Elevated LFTs R79.89    Unresponsive state R41.89    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 D/C Goal    Recall: 3 word memory and mental manipulation (ranking)- 100%.   Image retention following 10 min delay (dominic)- 90%.     Organization: Mild anomia noted in conversation.      Problem Solving/Reasoning: word deduction- 100%.       Other: Pt.  present, reports pt. May be transferred to ARU today following repeat head CT. ST utilized pocket talker to improve pt. Hearing of stimuli, resulted in pt. Reports and demo. Of increased comprehension and hearing accuracy.    Pt. Expressed appreciation for use of pocket talker.     Plan:  [x] Continue ST services    [] Discharge from ST:       Discharge recommendations:  [x] Further therapy recommended at discharge.   [] No therapy recommended at discharge.      Treatment completed by: Swathi Modi M.A.CCC/SLP

## 2024-06-13 NOTE — PROGRESS NOTES
Rehabilitation Physical Therapy    Date: 2024  Patient Name: Kavya DeS antiago      Room:   MRN: [id number]    : 1960  (64 y.o.)  Gender: female      Diagnosis: Severe diarrhea, Acute encephalopathy, pancolitis  Additional Pertinent Hx: Ms. Kayva De Santiago is a 64 y.o. female who was admitted to [facility] on 2024 with Loss of Consciousness     64-year-old female with history of type 2 diabetes, hypertension hyperlipidemia, CKD, GERD, DIEGO, obesity admitted with change in mental status, nausea vomiting diarrhea.  She required intubation reportedly patient had some upper respiratory symptoms over the past week with dry cough decreased appetite rhinorrhea congestion headache and sinus pain.  She then began to have some diarrhea and vomiting..  EMS noted GCS of 89 decreased O2 sat.  She was intubated CT chest showed no PE CT abdomen pelvis showed pancolitis with abnormal wall thickening of the ascending transverse descending colon sigmoid colon and rectum she was started antibiotic she had elevated glucose influenza was negative     Critical care -acute hypoxic respite failure intubated  extubated , sepsis with H influenza bacteremia and meningitis question colitis    Discharge Recommendations:  Therapy recommended at discharge       Assessment  Assessment  Activity Tolerance: Patient limited by fatigue;Patient limited by endurance;Patient limited by pain (headache, Educated pt on the importance of working with therapy every single day and that pt will be going to Acute Rehab which is 3 hours of therapy every day, 7 days a week. Pt needs extra encouragement for participation.)  Discharge Recommendations: Therapy recommended at discharge    Plan  Physical Therapy Plan  General Plan: 5-7 times per week    Current Treatment Recommendations: Strengthening, ROM, Balance training, Functional mobility training, Transfer training, Gait training, Cognitive/Perceptual training, Safety  awareness training, Verbal cues, Tactile cues  Rolling: Stand-by assistance  Supine to Sit: Assist X1, Stand-by assistance (HOB elevated to 52 degrees and 1 bedrail.)  Sit to Supine:  (Not tested, pt left in bedside recliner)  Scooting: Stand-by assistance    Transfer Training  Transfer Training: Yes (pt not feeling well due to headache. defers standing at this time)  Overall Level of Assistance: Assist X2, Minimum assistance  Interventions: Safety awareness training, Verbal cues, Tactile cues  Sit to Stand: Contact-guard assistance, Assist X1 (cues for safe hand placement and sequencing)  Stand to Sit: Contact-guard assistance, Assist X1 (cues for safe hand placement and sequencing)  Stand Pivot Transfers: Additional time, Adaptive equipment, Assist X1, Minimum assistance (Standing with RW)  Bed to Chair: Assist X2, Contact-guard assistance, Additional time, Adaptive equipment (w/c follow, standing with RW)      Gait Training  Gait Training: Yes  Overall Level of Assistance: Minimum assistance, Assist X1, Other (comment), Additional time, Adaptive equipment (wheelchair follow)  Distance (ft): 8 Feet (8ft seated rest break, 14ft seated rest break)  Assistive Device: Gait belt, Walker, rolling (close wheelchair follow)  Interventions: Safety awareness training, Verbal cues, Tactile cues  Base of Support: Widened  Speed/Kaelyn: Slow, Shuffled  Step Length: Right shortened, Left shortened  Swing Pattern: Right asymmetrical, Left asymmetrical  Stance: Weight shift  Gait Abnormalities: Decreased step clearance, Path deviations, Trunk sway increased, Step to gait, Shuffling gait    Balance  Sitting: Impaired (pt sat EOB 20 minutes with max A initially, pt progressed to CGA with time)  Sitting - Static: Good (unsupported)  Sitting - Dynamic: Fair (occasional)  Standing: Impaired  Standing - Static: Fair  Standing - Dynamic: Fair     PT Exercises  Exercise Treatment: Monitor Vitals  PROM Exercises: ankle pumps x 10

## 2024-06-13 NOTE — CARE COORDINATION
ONGOING DISCHARGE  NOTE:      Writer reviewed notes, Patient is agreeable to discharge to [facility] today     Patient is able to admit to [facility] today, waiting on a bed to become available.     Patient will need a holter monitor placed before discharge      Will continue to follow for additional discharge needs.     If patient is discharged prior to next notation, then this note serves as note for discharge by case management.    Electronically signed by Jordyn Dunaway RN on 6/13/2024 at 12:00 PM

## 2024-06-13 NOTE — PLAN OF CARE
Problem: Safety - Adult  Goal: Free from fall injury  6/13/2024 0114 by Makeda Mesa  Outcome: Progressing  Note: Pt is free from falls this shift. Has been educated on use of call light.     Problem: Discharge Planning  Goal: Discharge to home or other facility with appropriate resources  6/13/2024 0114 by Makeda Mesa  Outcome: Progressing     Problem: Pain  Goal: Verbalizes/displays adequate comfort level or baseline comfort level  6/13/2024 0114 by Makeda Mesa  Outcome: Progressing  Flowsheets (Taken 6/10/2024 1745 by Nini Sumner RN)  Verbalizes/displays adequate comfort level or baseline comfort level:   Encourage patient to monitor pain and request assistance   Assess pain using appropriate pain scale   Implement non-pharmacological measures as appropriate and evaluate response   Administer analgesics based on type and severity of pain and evaluate response   Consider cultural and social influences on pain and pain management  Note: Pt is able to monitor their pain level and verbalize when they want medication to find relief.      Problem: Skin/Tissue Integrity  Goal: Absence of new skin breakdown  Description: 1.  Monitor for areas of redness and/or skin breakdown  2.  Assess vascular access sites hourly  3.  Every 4-6 hours minimum:  Change oxygen saturation probe site  4.  Every 4-6 hours:  If on nasal continuous positive airway pressure, respiratory therapy assess nares and determine need for appliance change or resting period.  Outcome: Progressing  Note: Free of new skin breakdown.

## 2024-06-13 NOTE — PROGRESS NOTES
Occupational Therapy  [facility]   INPATIENT OCCUPATIONAL THERAPY  PROGRESS NOTE  Date: 2024  Patient Name: Kavya De Santiago       Room: -  MRN: [id number]    : 1960  (64 y.o.)  Gender: female      Diagnosis: Colitis      Discharge Recommendations:  The patient would benefit from an intensive level of therapy after discharge from the facility. They should be able to tolerate 3-hours of Combined OT/PT/ST over 5 days/week or at least 900 minutes of  Combined Therapy over 7 days.    OT Equipment Recommendations  Other: TBD    Restrictions/Precautions  Restrictions/Precautions  Restrictions/Precautions: Fall Risk;General Precautions  Required Braces or Orthoses?: No    Vitals  Temp: 98.7 °F (37.1 °C)  Pulse: 85  Heart Rate Source: Monitor  Respirations: 18  SpO2: 97 %  O2 Device: None (Room air)  BP: (!) 176/103  MAP (Calculated): 127  BP Location: Left lower arm  BP Method: Automatic  Patient Position: Standing  Comment: SpO2 ranging from 96 to 99%, George SWENSON notified    Subjective  Subjective  Subjective: pt sleeping in bed upon arrival. pt very [place] and demo slow processing.  Subjective  Pain: pt reports 7/10 headache, \"front to back, side to side.\"  Comments: George sunshine. co-tx shakeel Crooks PTA for optimal participation and for safety during transfers.    Objective  Orientation  Overall Orientation Status: Within Functional Limits  Orientation Level: Oriented to person;Oriented to place;Oriented to time;Oriented to situation  Cognition  Overall Cognitive Status: Exceptions  Arousal/Alertness: Delayed responses to stimuli;Inconsistent responses to stimuli  Following Commands: Follows one step commands with increased time;Follows one step commands with repetition;Inconsistently follows commands  Attention Span: Difficulty attending to directions  Memory: Decreased short term memory;Decreased recall of recent events  Safety Judgement: Impaired;Decreased awareness of need

## 2024-06-13 NOTE — PROGRESS NOTES
[facility]  IN-PATIENT SERVICE  [facility]    PROGRESS NOTE             6/13/2024    10:15 AM    Name:   Kavya De Santiago  MRN:     [id number]     Acct:      [id number]   Room:   2117/2117-01   Day:  18  Admit Date:  5/26/2024  8:51 AM    PCP:  Shaina Hamilton MD  Code Status:  Full Code    Subjective:     C/C:   Chief Complaint   Patient presents with    Loss of Consciousness     Interval History Status: improved.    Patient is seen and examined at bedside, she is still having hard time hearing likely sequela of action for the meningitis. Patient has been afebrile, but continues to have elevated BP  She also continues to endorse headache that is 7 out of 10.  She states yesterday the headache was a 9 out of 10 throughout the day and only Tylenol helped lower the pain  She denies any numbness, tingling, chest pain, shortness of breath  Plan is to discharge to ARU once headache has resolved    Brief History:     64-year-old female with history of T2DM, HTN, HLD, CKD, GERD, DIEGO, obesity who was brought to the ED for AMS, nausea, vomiting, diarrhea. Patient is intubated at the time of my visit, her  Rj is present to answer questions. Reportedly patient had some upper respiratory symptoms over the past week or so, with a dry cough, decreased appetite, rhinorrhea, congestion, headache/sinus pain. This morning at 4 AM patient began to have diarrhea,  is unsure if it was bloody or not, and she also had several episodes of vomiting. When he checked on her he found her on the ground, EMS was called, they arrived she has a GCS was 8-9, O2 saturations dropped on the way to the ED, given etomidate and Versed and placed a supraglottic airway, on arrival to the ED she was intubated. CT head showed no acute abnormality, CTA chest showed no evidence of PE, CT abdomen pelvis showed pancolitis with abnormal wall thickening of the ascending, transverse, descending

## 2024-06-14 ENCOUNTER — HOSPITAL ENCOUNTER (INPATIENT)
Age: 64
LOS: 19 days | Discharge: HOME | DRG: 071 | End: 2024-07-03
Attending: PHYSICAL MEDICINE & REHABILITATION | Admitting: PHYSICAL MEDICINE & REHABILITATION
Payer: COMMERCIAL

## 2024-06-14 VITALS
WEIGHT: 201.5 LBS | HEIGHT: 62 IN | DIASTOLIC BLOOD PRESSURE: 65 MMHG | BODY MASS INDEX: 37.08 KG/M2 | HEART RATE: 84 BPM | RESPIRATION RATE: 16 BRPM | OXYGEN SATURATION: 92 % | SYSTOLIC BLOOD PRESSURE: 133 MMHG | TEMPERATURE: 97.5 F

## 2024-06-14 DIAGNOSIS — E11.22 TYPE 2 DIABETES MELLITUS WITH STAGE 2 CHRONIC KIDNEY DISEASE, WITHOUT LONG-TERM CURRENT USE OF INSULIN (HCC): ICD-10-CM

## 2024-06-14 DIAGNOSIS — H70.93 MASTOIDITIS OF BOTH SIDES: ICD-10-CM

## 2024-06-14 DIAGNOSIS — N18.2 TYPE 2 DIABETES MELLITUS WITH STAGE 2 CHRONIC KIDNEY DISEASE, WITHOUT LONG-TERM CURRENT USE OF INSULIN (HCC): ICD-10-CM

## 2024-06-14 DIAGNOSIS — Z86.61 HX OF MENINGITIS: Primary | ICD-10-CM

## 2024-06-14 PROBLEM — G93.40 ENCEPHALOPATHY: Status: ACTIVE | Noted: 2024-06-14

## 2024-06-14 LAB
ANION GAP SERPL CALCULATED.3IONS-SCNC: 10 MMOL/L (ref 9–17)
BASOPHILS # BLD: 0.1 K/UL (ref 0–0.2)
BASOPHILS NFR BLD: 1 % (ref 0–2)
BUN SERPL-MCNC: 4 MG/DL (ref 8–23)
CALCIUM SERPL-MCNC: 8.7 MG/DL (ref 8.6–10.4)
CHLORIDE SERPL-SCNC: 107 MMOL/L (ref 98–107)
CO2 SERPL-SCNC: 26 MMOL/L (ref 20–31)
CREAT SERPL-MCNC: 0.7 MG/DL (ref 0.5–0.9)
EOSINOPHIL # BLD: 0.4 K/UL (ref 0–0.4)
EOSINOPHILS RELATIVE PERCENT: 7 % (ref 0–4)
ERYTHROCYTE [DISTWIDTH] IN BLOOD BY AUTOMATED COUNT: 16.8 % (ref 11.5–14.9)
GFR, ESTIMATED: >90 ML/MIN/1.73M2
GLUCOSE BLD-MCNC: 170 MG/DL (ref 65–105)
GLUCOSE SERPL-MCNC: 197 MG/DL (ref 70–99)
HCT VFR BLD AUTO: 31.2 % (ref 36–46)
HGB BLD-MCNC: 9.8 G/DL (ref 12–16)
LYMPHOCYTES NFR BLD: 1.6 K/UL (ref 1–4.8)
LYMPHOCYTES RELATIVE PERCENT: 32 % (ref 24–44)
MCH RBC QN AUTO: 27.6 PG (ref 26–34)
MCHC RBC AUTO-ENTMCNC: 31.5 G/DL (ref 31–37)
MCV RBC AUTO: 87.9 FL (ref 80–100)
MONOCYTES NFR BLD: 0.6 K/UL (ref 0.1–1.3)
MONOCYTES NFR BLD: 12 % (ref 1–7)
NEUTROPHILS NFR BLD: 48 % (ref 36–66)
NEUTS SEG NFR BLD: 2.4 K/UL (ref 1.3–9.1)
PLATELET # BLD AUTO: 336 K/UL (ref 150–450)
PMV BLD AUTO: 7.4 FL (ref 6–12)
POTASSIUM SERPL-SCNC: 4 MMOL/L (ref 3.7–5.3)
RBC # BLD AUTO: 3.55 M/UL (ref 4–5.2)
SODIUM SERPL-SCNC: 143 MMOL/L (ref 135–144)
WBC OTHER # BLD: 5.1 K/UL (ref 3.5–11)

## 2024-06-14 PROCEDURE — 6370000000 HC RX 637 (ALT 250 FOR IP): Performed by: INTERNAL MEDICINE

## 2024-06-14 PROCEDURE — 85025 COMPLETE CBC W/AUTO DIFF WBC: CPT

## 2024-06-14 PROCEDURE — 1180000000 HC REHAB R&B

## 2024-06-14 PROCEDURE — 99232 SBSQ HOSP IP/OBS MODERATE 35: CPT | Performed by: INTERNAL MEDICINE

## 2024-06-14 PROCEDURE — 6360000002 HC RX W HCPCS

## 2024-06-14 PROCEDURE — 6370000000 HC RX 637 (ALT 250 FOR IP)

## 2024-06-14 PROCEDURE — 97129 THER IVNTJ 1ST 15 MIN: CPT

## 2024-06-14 PROCEDURE — 82947 ASSAY GLUCOSE BLOOD QUANT: CPT

## 2024-06-14 PROCEDURE — 2580000003 HC RX 258

## 2024-06-14 PROCEDURE — 99239 HOSP IP/OBS DSCHRG MGMT >30: CPT | Performed by: INTERNAL MEDICINE

## 2024-06-14 PROCEDURE — 97130 THER IVNTJ EA ADDL 15 MIN: CPT

## 2024-06-14 PROCEDURE — 80048 BASIC METABOLIC PNL TOTAL CA: CPT

## 2024-06-14 PROCEDURE — 6370000000 HC RX 637 (ALT 250 FOR IP): Performed by: PHYSICAL MEDICINE & REHABILITATION

## 2024-06-14 PROCEDURE — 2580000003 HC RX 258: Performed by: INTERNAL MEDICINE

## 2024-06-14 PROCEDURE — 6360000002 HC RX W HCPCS: Performed by: INTERNAL MEDICINE

## 2024-06-14 PROCEDURE — 6370000000 HC RX 637 (ALT 250 FOR IP): Performed by: NURSE PRACTITIONER

## 2024-06-14 PROCEDURE — 36415 COLL VENOUS BLD VENIPUNCTURE: CPT

## 2024-06-14 RX ORDER — ONDANSETRON 4 MG/1
4 TABLET, ORALLY DISINTEGRATING ORAL EVERY 8 HOURS PRN
Status: CANCELLED | OUTPATIENT
Start: 2024-06-14

## 2024-06-14 RX ORDER — ACETAMINOPHEN 650 MG/1
650 SUPPOSITORY RECTAL EVERY 6 HOURS PRN
Status: DISCONTINUED | OUTPATIENT
Start: 2024-06-14 | End: 2024-06-18

## 2024-06-14 RX ORDER — INSULIN LISPRO 100 [IU]/ML
0-8 INJECTION, SOLUTION INTRAVENOUS; SUBCUTANEOUS
Status: DISCONTINUED | OUTPATIENT
Start: 2024-06-14 | End: 2024-06-20

## 2024-06-14 RX ORDER — ENOXAPARIN SODIUM 100 MG/ML
40 INJECTION SUBCUTANEOUS DAILY
Status: DISCONTINUED | OUTPATIENT
Start: 2024-06-15 | End: 2024-07-03 | Stop reason: HOSPADM

## 2024-06-14 RX ORDER — ENOXAPARIN SODIUM 100 MG/ML
30 INJECTION SUBCUTANEOUS 2 TIMES DAILY
Status: DISCONTINUED | OUTPATIENT
Start: 2024-06-14 | End: 2024-06-14

## 2024-06-14 RX ORDER — POLYETHYLENE GLYCOL 3350 17 G/17G
17 POWDER, FOR SOLUTION ORAL DAILY
Status: DISCONTINUED | OUTPATIENT
Start: 2024-06-15 | End: 2024-07-03 | Stop reason: HOSPADM

## 2024-06-14 RX ORDER — VENLAFAXINE HYDROCHLORIDE 75 MG/1
75 CAPSULE, EXTENDED RELEASE ORAL DAILY
Status: CANCELLED | OUTPATIENT
Start: 2024-06-15

## 2024-06-14 RX ORDER — ACETAMINOPHEN 650 MG/1
650 SUPPOSITORY RECTAL EVERY 6 HOURS PRN
Status: CANCELLED | OUTPATIENT
Start: 2024-06-14

## 2024-06-14 RX ORDER — ASPIRIN 81 MG/1
81 TABLET ORAL DAILY
Status: CANCELLED | OUTPATIENT
Start: 2024-06-15

## 2024-06-14 RX ORDER — LISINOPRIL 20 MG/1
40 TABLET ORAL DAILY
Status: DISCONTINUED | OUTPATIENT
Start: 2024-06-15 | End: 2024-06-20

## 2024-06-14 RX ORDER — BISACODYL 10 MG
10 SUPPOSITORY, RECTAL RECTAL DAILY PRN
Status: CANCELLED | OUTPATIENT
Start: 2024-06-14

## 2024-06-14 RX ORDER — INSULIN LISPRO 100 [IU]/ML
0-8 INJECTION, SOLUTION INTRAVENOUS; SUBCUTANEOUS
Status: CANCELLED | OUTPATIENT
Start: 2024-06-14

## 2024-06-14 RX ORDER — SPIRONOLACTONE 25 MG/1
25 TABLET ORAL DAILY
Status: DISCONTINUED | OUTPATIENT
Start: 2024-06-15 | End: 2024-06-30

## 2024-06-14 RX ORDER — ONDANSETRON 4 MG/1
4 TABLET, ORALLY DISINTEGRATING ORAL EVERY 8 HOURS PRN
Status: DISCONTINUED | OUTPATIENT
Start: 2024-06-14 | End: 2024-07-03 | Stop reason: HOSPADM

## 2024-06-14 RX ORDER — SPIRONOLACTONE 25 MG/1
25 TABLET ORAL DAILY
Status: CANCELLED | OUTPATIENT
Start: 2024-06-15

## 2024-06-14 RX ORDER — ONDANSETRON 2 MG/ML
4 INJECTION INTRAMUSCULAR; INTRAVENOUS EVERY 6 HOURS PRN
Status: CANCELLED | OUTPATIENT
Start: 2024-06-14

## 2024-06-14 RX ORDER — ACETAMINOPHEN 325 MG/1
650 TABLET ORAL EVERY 6 HOURS PRN
Status: DISCONTINUED | OUTPATIENT
Start: 2024-06-14 | End: 2024-06-18

## 2024-06-14 RX ORDER — HYDRALAZINE HYDROCHLORIDE 50 MG/1
50 TABLET, FILM COATED ORAL EVERY 6 HOURS SCHEDULED
Status: DISCONTINUED | OUTPATIENT
Start: 2024-06-15 | End: 2024-06-17

## 2024-06-14 RX ORDER — INSULIN GLARGINE 100 [IU]/ML
20 INJECTION, SOLUTION SUBCUTANEOUS DAILY
Status: DISCONTINUED | OUTPATIENT
Start: 2024-06-15 | End: 2024-06-18

## 2024-06-14 RX ORDER — BISACODYL 10 MG
10 SUPPOSITORY, RECTAL RECTAL DAILY PRN
Status: DISCONTINUED | OUTPATIENT
Start: 2024-06-14 | End: 2024-07-03 | Stop reason: HOSPADM

## 2024-06-14 RX ORDER — HYDRALAZINE HYDROCHLORIDE 50 MG/1
50 TABLET, FILM COATED ORAL EVERY 6 HOURS SCHEDULED
Status: CANCELLED | OUTPATIENT
Start: 2024-06-14

## 2024-06-14 RX ORDER — ATORVASTATIN CALCIUM 40 MG/1
40 TABLET, FILM COATED ORAL NIGHTLY
Status: CANCELLED | OUTPATIENT
Start: 2024-06-14

## 2024-06-14 RX ORDER — LANOLIN ALCOHOL/MO/W.PET/CERES
400 CREAM (GRAM) TOPICAL DAILY
Status: DISCONTINUED | OUTPATIENT
Start: 2024-06-15 | End: 2024-06-20

## 2024-06-14 RX ORDER — BISACODYL 10 MG
10 SUPPOSITORY, RECTAL RECTAL DAILY PRN
Status: DISCONTINUED | OUTPATIENT
Start: 2024-06-14 | End: 2024-06-18

## 2024-06-14 RX ORDER — ACETAMINOPHEN 325 MG/1
650 TABLET ORAL EVERY 4 HOURS PRN
Status: DISCONTINUED | OUTPATIENT
Start: 2024-06-14 | End: 2024-06-21

## 2024-06-14 RX ORDER — INSULIN GLARGINE 100 [IU]/ML
20 INJECTION, SOLUTION SUBCUTANEOUS DAILY
Status: CANCELLED | OUTPATIENT
Start: 2024-06-15

## 2024-06-14 RX ORDER — ACETAMINOPHEN 325 MG/1
650 TABLET ORAL EVERY 6 HOURS PRN
Status: CANCELLED | OUTPATIENT
Start: 2024-06-14

## 2024-06-14 RX ORDER — ASPIRIN 81 MG/1
81 TABLET ORAL DAILY
Status: DISCONTINUED | OUTPATIENT
Start: 2024-06-15 | End: 2024-07-03 | Stop reason: HOSPADM

## 2024-06-14 RX ORDER — BUSPIRONE HYDROCHLORIDE 5 MG/1
10 TABLET ORAL 3 TIMES DAILY
Status: DISCONTINUED | OUTPATIENT
Start: 2024-06-15 | End: 2024-06-21

## 2024-06-14 RX ORDER — ONDANSETRON 2 MG/ML
4 INJECTION INTRAMUSCULAR; INTRAVENOUS EVERY 6 HOURS PRN
Status: DISCONTINUED | OUTPATIENT
Start: 2024-06-14 | End: 2024-07-03 | Stop reason: HOSPADM

## 2024-06-14 RX ORDER — SENNOSIDES A AND B 8.6 MG/1
2 TABLET, FILM COATED ORAL DAILY PRN
Status: DISCONTINUED | OUTPATIENT
Start: 2024-06-14 | End: 2024-07-03 | Stop reason: HOSPADM

## 2024-06-14 RX ORDER — BUSPIRONE HYDROCHLORIDE 10 MG/1
10 TABLET ORAL 3 TIMES DAILY
Status: CANCELLED | OUTPATIENT
Start: 2024-06-14

## 2024-06-14 RX ORDER — NIFEDIPINE 30 MG
30 TABLET, EXTENDED RELEASE ORAL NIGHTLY
Status: CANCELLED | OUTPATIENT
Start: 2024-06-14

## 2024-06-14 RX ORDER — LANOLIN ALCOHOL/MO/W.PET/CERES
400 CREAM (GRAM) TOPICAL DAILY
Status: CANCELLED | OUTPATIENT
Start: 2024-06-15

## 2024-06-14 RX ORDER — VENLAFAXINE HYDROCHLORIDE 75 MG/1
75 CAPSULE, EXTENDED RELEASE ORAL DAILY
Status: DISCONTINUED | OUTPATIENT
Start: 2024-06-15 | End: 2024-07-03 | Stop reason: HOSPADM

## 2024-06-14 RX ORDER — NIFEDIPINE 30 MG
30 TABLET, EXTENDED RELEASE ORAL NIGHTLY
Status: DISCONTINUED | OUTPATIENT
Start: 2024-06-15 | End: 2024-06-20

## 2024-06-14 RX ORDER — ATORVASTATIN CALCIUM 40 MG/1
40 TABLET, FILM COATED ORAL NIGHTLY
Status: DISCONTINUED | OUTPATIENT
Start: 2024-06-15 | End: 2024-07-03 | Stop reason: HOSPADM

## 2024-06-14 RX ORDER — METOPROLOL TARTRATE 100 MG/1
100 TABLET ORAL 2 TIMES DAILY
Status: DISCONTINUED | OUTPATIENT
Start: 2024-06-15 | End: 2024-07-03 | Stop reason: HOSPADM

## 2024-06-14 RX ORDER — METOPROLOL TARTRATE 100 MG/1
100 TABLET ORAL 2 TIMES DAILY
Status: CANCELLED | OUTPATIENT
Start: 2024-06-14

## 2024-06-14 RX ORDER — LISINOPRIL 20 MG/1
40 TABLET ORAL DAILY
Status: CANCELLED | OUTPATIENT
Start: 2024-06-15

## 2024-06-14 RX ADMIN — SODIUM CHLORIDE, PRESERVATIVE FREE 10 ML: 5 INJECTION INTRAVENOUS at 19:57

## 2024-06-14 RX ADMIN — LISINOPRIL 40 MG: 20 TABLET ORAL at 10:00

## 2024-06-14 RX ADMIN — SPIRONOLACTONE 25 MG: 25 TABLET ORAL at 10:00

## 2024-06-14 RX ADMIN — METOPROLOL 100 MG: 100 TABLET ORAL at 10:00

## 2024-06-14 RX ADMIN — ACETAMINOPHEN 650 MG: 325 TABLET ORAL at 09:59

## 2024-06-14 RX ADMIN — VENLAFAXINE HYDROCHLORIDE 75 MG: 75 CAPSULE, EXTENDED RELEASE ORAL at 10:00

## 2024-06-14 RX ADMIN — NIFEDIPINE 30 MG: 30 TABLET, EXTENDED RELEASE ORAL at 19:57

## 2024-06-14 RX ADMIN — METOPROLOL 100 MG: 100 TABLET ORAL at 19:57

## 2024-06-14 RX ADMIN — ACETAMINOPHEN 650 MG: 325 TABLET ORAL at 03:05

## 2024-06-14 RX ADMIN — SODIUM CHLORIDE, PRESERVATIVE FREE 10 ML: 5 INJECTION INTRAVENOUS at 10:01

## 2024-06-14 RX ADMIN — HYDRALAZINE HYDROCHLORIDE 50 MG: 50 TABLET ORAL at 05:38

## 2024-06-14 RX ADMIN — BUSPIRONE HYDROCHLORIDE 10 MG: 10 TABLET ORAL at 12:28

## 2024-06-14 RX ADMIN — ROPINIROLE HYDROCHLORIDE 2 MG: 2 TABLET, FILM COATED ORAL at 19:56

## 2024-06-14 RX ADMIN — CEFTRIAXONE SODIUM 2000 MG: 2 INJECTION, POWDER, FOR SOLUTION INTRAMUSCULAR; INTRAVENOUS at 15:48

## 2024-06-14 RX ADMIN — ANTI-FUNGAL POWDER MICONAZOLE NITRATE TALC FREE: 1.42 POWDER TOPICAL at 19:57

## 2024-06-14 RX ADMIN — ATORVASTATIN CALCIUM 40 MG: 40 TABLET, FILM COATED ORAL at 19:57

## 2024-06-14 RX ADMIN — ENOXAPARIN SODIUM 30 MG: 100 INJECTION SUBCUTANEOUS at 19:57

## 2024-06-14 RX ADMIN — ACETAMINOPHEN 650 MG: 325 TABLET ORAL at 22:42

## 2024-06-14 RX ADMIN — LATANOPROST 1 DROP: 50 SOLUTION OPHTHALMIC at 20:00

## 2024-06-14 RX ADMIN — QUETIAPINE FUMARATE 50 MG: 50 TABLET ORAL at 19:57

## 2024-06-14 RX ADMIN — BUSPIRONE HYDROCHLORIDE 10 MG: 10 TABLET ORAL at 19:57

## 2024-06-14 RX ADMIN — DICLOFENAC SODIUM 4 G: 10 GEL TOPICAL at 10:03

## 2024-06-14 RX ADMIN — ONDANSETRON 4 MG: 2 INJECTION INTRAMUSCULAR; INTRAVENOUS at 12:34

## 2024-06-14 RX ADMIN — ANTI-FUNGAL POWDER MICONAZOLE NITRATE TALC FREE: 1.42 POWDER TOPICAL at 10:02

## 2024-06-14 RX ADMIN — Medication 400 MG: at 10:00

## 2024-06-14 RX ADMIN — ROPINIROLE HYDROCHLORIDE 2 MG: 2 TABLET, FILM COATED ORAL at 10:01

## 2024-06-14 RX ADMIN — BUSPIRONE HYDROCHLORIDE 10 MG: 10 TABLET ORAL at 10:00

## 2024-06-14 RX ADMIN — DICLOFENAC SODIUM 4 G: 10 GEL TOPICAL at 20:00

## 2024-06-14 RX ADMIN — INSULIN GLARGINE 20 UNITS: 100 INJECTION, SOLUTION SUBCUTANEOUS at 09:59

## 2024-06-14 RX ADMIN — ASPIRIN 81 MG: 81 TABLET, COATED ORAL at 10:01

## 2024-06-14 RX ADMIN — ENOXAPARIN SODIUM 30 MG: 100 INJECTION SUBCUTANEOUS at 10:00

## 2024-06-14 RX ADMIN — CEFTRIAXONE SODIUM 2000 MG: 2 INJECTION, POWDER, FOR SOLUTION INTRAMUSCULAR; INTRAVENOUS at 04:36

## 2024-06-14 RX ADMIN — HYDRALAZINE HYDROCHLORIDE 50 MG: 50 TABLET ORAL at 12:28

## 2024-06-14 RX ADMIN — HYDRALAZINE HYDROCHLORIDE 50 MG: 50 TABLET ORAL at 17:05

## 2024-06-14 ASSESSMENT — PAIN DESCRIPTION - DESCRIPTORS
DESCRIPTORS: THROBBING
DESCRIPTORS: ACHING

## 2024-06-14 ASSESSMENT — PAIN SCALES - GENERAL
PAINLEVEL_OUTOF10: 3
PAINLEVEL_OUTOF10: 5

## 2024-06-14 ASSESSMENT — PAIN DESCRIPTION - LOCATION
LOCATION: HEAD;NECK
LOCATION: HEAD
LOCATION: HEAD
LOCATION: HEAD;NECK
LOCATION: HEAD
LOCATION: HEAD
LOCATION: HEAD;NECK

## 2024-06-14 ASSESSMENT — PAIN DESCRIPTION - ORIENTATION: ORIENTATION: POSTERIOR

## 2024-06-14 ASSESSMENT — PAIN - FUNCTIONAL ASSESSMENT: PAIN_FUNCTIONAL_ASSESSMENT: PREVENTS OR INTERFERES SOME ACTIVE ACTIVITIES AND ADLS

## 2024-06-14 NOTE — PROGRESS NOTES
procalcitonin 11.4.  COVID-19 and influenza test were negative.  Blood cultures grew Haemophilus influenzae.     Because of positive blood culture and abnormal CT for acute to subacute infarct in the left posterior frontal I was asked to see patient and perform EMANUEL     I saw patient couple of days ago.  I arranged for EMANUEL but patient developed marked altered mental status and she was tachypneic and EMANUEL was postponed     I performed EMANUEL 6-7-2024 and it did not show any evidence of endocarditis.     Current evaluation  Patient seen and examined, medications and labs checked  She was resting on bed watching TV  Able to ambulate with the help  Still has headache and mild nausea  She said she gets 6 to 7 hours of sleep or headache will improve  Neck veins were normal lungs were clear on auscultation no peripheral  ECG monitor sinus rhythm  No further episode of ventricular or supraventricular tachycardia    Blood pressure 156/57, heart rate 67, oxygen saturation 93% on room air  WBC 5.1, hemoglobin 9.8, platelets 336  Sodium 143, potassium 4.0, BUN 4, creatinine 0.7, glucose 197      Medications:   Scheduled Meds:   NIFEdipine  30 mg Oral Nightly    spironolactone  25 mg Oral Daily    insulin lispro  0-8 Units SubCUTAneous 4x Daily AC & HS    hydrALAZINE  50 mg Oral 4 times per day    cefTRIAXone (ROCEPHIN) IV  2,000 mg IntraVENous Q12H    lidocaine  1 patch TransDERmal Daily    diclofenac sodium  4 g Topical BID    magnesium oxide  400 mg Oral Daily    QUEtiapine  50 mg Oral Nightly    metoprolol tartrate  100 mg Oral BID    lisinopril  40 mg Oral Daily    busPIRone  10 mg Per NG tube TID    insulin glargine  20 Units SubCUTAneous Daily    miconazole   Topical BID    enoxaparin  30 mg SubCUTAneous BID    rOPINIRole  2 mg Oral BID    aspirin  81 mg Oral Daily    atorvastatin  40 mg Oral Nightly    latanoprost  1 drop Both Eyes Nightly    venlafaxine  75 mg Oral Daily    sodium chloride flush  5-40 mL IntraVENous 2 times  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 obesity (BMI 35.0-39.9) with comorbidity ([facility])     Stenosis of lateral recess of lumbosacral spine     Type 2 diabetes mellitus with chronic kidney disease     Sacroiliac joint pain     Type 2 diabetes mellitus with hyperglycemia     Severe diarrhea     Pancolitis (HCC)     Fever     Acute respiratory failure (HCC)     Colitis     Haemophilus influenzae meningitis     Bacteremia     Sepsis due to Haemophilus influenzae with acute respiratory failure without septic shock (HCC)     Anemia     Elevated LFTs     Unresponsive state     Meningoencephalitis     History of TIA (transient ischemic attack)     Cerebrovascular accident (CVA) ([facility])      Plan of Treatment:   Medications reviewed  1: Haemophilus influenza bacteremia, meningitis patient is on  Rocephin 2 g IV every 12 hours till 7/10/2024  EMANUEL 6/7/2024 no evidence of endocarditis  2: Hypertension she is on metoprolol 100 mg p.o. twice daily, lisinopril 40 mg a day, hydralazine 50 mg 3 times a day, add amlodipine 10 mg in the p.m.  Patient is having problem of hypokalemia and persistent elevation in blood pressure it appears most likely she has hyperaldosteronism start Aldactone 25 mg a day may need up to 50 mg daily  3: Diabetes mellitus on Lantus insulin and sliding scale insulin  4: Hyperlipidemia on Lipitor 40 mg a day  5: GERD on Protonix  6: Episode of 30 beats polymorphic ventricular tachycardia heart rate 250 on 6/9/2024 patient received 2 g IV magnesium, magnesium was 1.5, serum potassium was 3.3 early morning ,negative Lexiscan Myoview stress test most likely cause of ventricular arrhythmia severe hypomagnesemia and hypokalemia  6: Check serum potassium and magnesium daily keep potassium above 4 and magnesium above 2  Patient is on Protonix it can cause hypokalemia and hypomagnesemia,

## 2024-06-14 NOTE — PROGRESS NOTES
Nutrition Note    Pt to discharge to ARU this date. Intake 25-50% of meals; % intake of Glucerna nicki with B.    Electronically signed by OZIEL LAND MS, LD on 6/14/24 at 3:20 PM EDT    Contact: [phone]

## 2024-06-14 NOTE — PROGRESS NOTES
TONSILLECTOMY      as child    UPPER GASTROINTESTINAL ENDOSCOPY  07/23/2012    [facility]       Medications:      NIFEdipine  30 mg Oral Nightly    spironolactone  25 mg Oral Daily    insulin lispro  0-8 Units SubCUTAneous 4x Daily AC & HS    hydrALAZINE  50 mg Oral 4 times per day    cefTRIAXone (ROCEPHIN) IV  2,000 mg IntraVENous Q12H    lidocaine  1 patch TransDERmal Daily    diclofenac sodium  4 g Topical BID    magnesium oxide  400 mg Oral Daily    QUEtiapine  50 mg Oral Nightly    metoprolol tartrate  100 mg Oral BID    lisinopril  40 mg Oral Daily    busPIRone  10 mg Per NG tube TID    insulin glargine  20 Units SubCUTAneous Daily    miconazole   Topical BID    enoxaparin  30 mg SubCUTAneous BID    rOPINIRole  2 mg Oral BID    aspirin  81 mg Oral Daily    atorvastatin  40 mg Oral Nightly    latanoprost  1 drop Both Eyes Nightly    venlafaxine  75 mg Oral Daily    sodium chloride flush  5-40 mL IntraVENous 2 times per day    sodium chloride  500 mL IntraVENous Once       Social History:     Social History     Socioeconomic History    Marital status:      Spouse name: Not on file    Number of children: Not on file    Years of education: Not on file    Highest education level: Not on file   Occupational History    Not on file   Tobacco Use    Smoking status: Never    Smokeless tobacco: Never   Vaping Use    Vaping Use: Never used   Substance and Sexual Activity    Alcohol use: Yes     Alcohol/week: 0.0 standard drinks of alcohol     Comment: rarely/ 4 times a year    Drug use: Never    Sexual activity: Yes   Other Topics Concern    Not on file   Social History Narrative    Not on file     Social Determinants of Health     Financial Resource Strain: Low Risk  (11/14/2023)    Overall Financial Resource Strain (CARDIA)     Difficulty of Paying Living Expenses: Not hard at all   Food Insecurity: No Food Insecurity (6/4/2024)    Hunger Vital Sign     Worried About Running Out of Food in the Last Year: Never  true     Ran Out of Food in the Last Year: Never true   Transportation Needs: No Transportation Needs (6/4/2024)    PRAPARE - Transportation     Lack of Transportation (Medical): No     Lack of Transportation (Non-Medical): No   Physical Activity: Unknown (9/30/2022)    Exercise Vital Sign     Days of Exercise per Week: 0 days     Minutes of Exercise per Session: Not on file   Stress: Stress Concern Present (9/30/2022)    [employer] - Occupational Stress Questionnaire     Feeling of Stress : To some extent   Social Connections: Socially Integrated (9/30/2022)    Social Connection and Isolation Panel [NHANES]     Frequency of Communication with Friends and Family: More than three times a week     Frequency of Social Gatherings with Friends and Family: Once a week     Attends Orthodoxy Services: More than 4 times per year     Active Member of Clubs or Organizations: Yes     Attends Club or Organization Meetings: More than 4 times per year     Marital Status:    Intimate Partner Violence: Not At Risk (9/30/2022)    Humiliation, Afraid, Rape, and Kick questionnaire     Fear of Current or Ex-Partner: No     Emotionally Abused: No     Physically Abused: No     Sexually Abused: No   Housing Stability: Low Risk  (6/4/2024)    Housing Stability Vital Sign     Unable to Pay for Housing in the Last Year: No     Number of Places Lived in the Last Year: 1     Unstable Housing in the Last Year: No       Family History:     Family History   Problem Relation Age of Onset    Heart Attack Mother     Diabetes Mother     Diabetes Father     Heart Attack Father       Medical Decision Making:   I have independently reviewed/ordered the following labs:    CBC with Differential:   Recent Labs     06/13/24  0524 06/14/24  0601   WBC 5.6 5.1   HGB 8.9* 9.8*   HCT 28.4* 31.2*    336   LYMPHOPCT 26 32   MONOPCT 13* 12*   EOSPCT 8* 7*       BMP:  Recent Labs     06/12/24  0505 06/12/24  1338

## 2024-06-14 NOTE — PROGRESS NOTES
Patient seen and examined all questions answered Case discussed in person with cardiologist Dr. Zelaya  Medically cleared discharge to acute rehab unit discharge readmit done  Khoi Cruz MD  6/14/2024

## 2024-06-14 NOTE — PROGRESS NOTES
Physical Therapy        Physical Therapy Cancel Note      DATE: 2024    NAME: Kavya De Santiago  MRN: [id number]   : 1960      Patient not seen this date for Physical Therapy due to:    Other: Cx; patient to discharge to ARU this date. Will continue to follow for therapy updates.      Electronically signed by Wendi Dhaliwal PTA on 2024 at 1:53 PM

## 2024-06-14 NOTE — PLAN OF CARE
64-year-old female with extensive stay in ICU with acute hypoxic Respiratory failure septic shock 2/2 H. influenzae bacteremia and meningitis, CSF studies positive, CT scan of abdomen also showed colitis, patient received ceftriaxone IV, and presumed C. difficile infection 10-day course of oral vancomycin.  During ICU stay, we also found that patient has acute stroke on MRI, EMANUEL was ordered with concerns for vegetation, EMANUEL negative for any vegetation, MRI also showed mastoiditis, ID was on board throughout, patient is to remain on IV ceftriaxone till 07/12/2024 for 6-week course of mastoiditis.    Patient was extubated and moved out of the ICU, patient had 2 days of fever ranging  and a bump in WBC count, blood cultures again taken so far no growth, continued antibiotic at same per ID, patient has been afebrile for the last 5 days, continuously improving, she does have some residual hearing loss from meningitis, no she is very hard of hearing.    3 days ago she also had her an episode of 30 runs of V. tach, patient remained asymptomatic, cardiology was taken on board, patient potassium and magnesium corrected, she also underwent stress test which is low risk.  Patient is now here only for placement to acute rehab, barrier to acute rehab especially with uncontrolled hypertension, she is on metoprolol 100 twice daily, lisinopril 40 daily, hydralazine 53 times daily and amlodipine 10 mg, Aldactone 25 mg was started, might need to increase up to 50 as per cardiology.  Can be transferred to acute rehab if blood pressure is well-controlled when cardiology clears her.    Cristobal Ferrer   resident  PGY1

## 2024-06-14 NOTE — DISCHARGE INSTR - DIET

## 2024-06-14 NOTE — PROGRESS NOTES
How Severe Are Your Spot(S)?: mild SPEECH LANGUAGE PATHOLOGY  Speech Language Pathology  ST PROGRESSIVE CARE    Cognitive Treatment Note    Date: 6/14/2024  Patient’s Name: Kavya De Santiago  MRN: [id number]  Diagnosis:   Patient Active Problem List   Diagnosis Code    Cervical spondylosis M47.812    Type 2 diabetes mellitus without complication, without long-term current use of insulin ([facility]) E11.9    Hypertension I10    Abnormal auditory perception H93.299    Nasal polyps J33.9    Osteoarthritis of left knee M17.12    Osteoarthritis of right knee M17.11    DIEGO (nonalcoholic steatohepatitis) K75.81    Vitamin D deficiency E55.9    Iron deficiency anemia D50.9    Dyslipidemia E78.5    Trochanteric bursitis M70.60    Chronic left shoulder pain M25.512, G89.29    Restless legs syndrome G25.81    Generalized anxiety disorder F41.1    Acute encephalopathy G93.40    Class 2 obesity in adult E66.9    Leg edema R60.0    Recurrent major depressive disorder ([facility]) F33.9    Dermatitis L30.9    Retrolisthesis of vertebrae M43.10    Bilateral carpal tunnel syndrome G56.03    Intention tremor G25.2    Sciatica M54.30    Back pain with radiation M54.9    Left sciatic nerve pain M54.32    Lumbar disc disease with radiculopathy M51.16    Intractable back pain M54.9    Piriformis syndrome of left side G57.02    Ulnar neuropathy at elbow, left G56.22    S/P decompression of ulnar nerve at elbow Z98.890    Cervical myelopathy with cervical radiculopathy ([facility]) G95.9, M54.12    Lumbar spondylosis M47.816    Right median nerve neuropathy G56.11    Sacroiliitis ([facility]) M46.1    Secondary diabetes mellitus with stage 2 chronic kidney disease (GFR 60-89) ([facility]) E13.22, N18.2    Benign hypertension with CKD (chronic kidney disease) stage III ([facility]) I12.9, N18.30    Severe obesity (BMI 35.0-39.9) with comorbidity ([facility]) E66.01    Stenosis of lateral recess of lumbosacral spine M48.07    Type 2 diabetes mellitus with chronic kidney disease E11.22    Sacroiliac joint pain M53.3     Type 2 diabetes mellitus with hyperglycemia E11.65    Severe diarrhea K52.9    Pancolitis ([facility]) K51.00    Fever R50.9    Acute respiratory failure ([facility]) J96.00    Colitis K52.9    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    Bacteremia R78.81    Sepsis due to Haemophilus influenzae with acute respiratory failure without septic shock ([facility]) A41.3, R65.20, J96.00    Anemia D64.9    Elevated LFTs R79.89    Unresponsive state R41.89    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 D/C Goal    Recall: 3 word immediate recall- 100%, 3 word delayed recall- 67%.   Paragraph recall- 100%    Organization: convergent categorization and inferences- 100%.  Divergent naming- (animals)- 8 items in 60 seconds.     Problem Solving/Reasoning: multiprocess reasoning- 100%.  Abstract reasoning- 85%.     Other: Pt. To d/c to ARU this pm.    ST educ. Pt. And her  re: continuation of ST for cognition focusing on thought processing and memory to assist c pt. Prolonged response time.    Pt. Benefitted from use of Pocket talker to increase hearing during session.     Pt. Expressed appreciation for use of pocket talker.     Plan:  [x] Continue ST services    [] Discharge from ST:       Discharge recommendations:  [x] Further therapy recommended at discharge.   [] No therapy recommended at discharge.      Treatment completed by: Swathi Modi M.A.CCC/SLP     What Type Of Note Output Would You Prefer (Optional)?: Standard Output What Is The Reason For Today's Visit?: Full Body Skin Examination What Is The Reason For Today's Visit? (Being Monitored For X): the development of new lesions

## 2024-06-14 NOTE — CARE COORDINATION
Writer spoke to Harvey in admissions at ARU. Patient can transfer to ARU after 7:30pm once bed will be available. Discharge readmit will need to be completed with Lovenox. Bedside nurse notified.      Number for report [phone]

## 2024-06-15 LAB
ALBUMIN SERPL-MCNC: 3.4 G/DL (ref 3.5–5.2)
ALP SERPL-CCNC: 101 U/L (ref 35–104)
ALT SERPL-CCNC: 19 U/L (ref 5–33)
ANION GAP SERPL CALCULATED.3IONS-SCNC: 11 MMOL/L (ref 9–17)
AST SERPL-CCNC: 22 U/L
BASOPHILS # BLD: 0.1 K/UL (ref 0–0.2)
BASOPHILS NFR BLD: 1 % (ref 0–2)
BILIRUB DIRECT SERPL-MCNC: 0.1 MG/DL
BILIRUB INDIRECT SERPL-MCNC: 0.1 MG/DL (ref 0–1)
BILIRUB SERPL-MCNC: 0.2 MG/DL (ref 0.3–1.2)
BUN SERPL-MCNC: 7 MG/DL (ref 8–23)
CALCIUM SERPL-MCNC: 8.6 MG/DL (ref 8.6–10.4)
CHLORIDE SERPL-SCNC: 105 MMOL/L (ref 98–107)
CO2 SERPL-SCNC: 27 MMOL/L (ref 20–31)
CREAT SERPL-MCNC: 0.8 MG/DL (ref 0.5–0.9)
EOSINOPHIL # BLD: 0.4 K/UL (ref 0–0.4)
EOSINOPHILS RELATIVE PERCENT: 7 % (ref 0–4)
ERYTHROCYTE [DISTWIDTH] IN BLOOD BY AUTOMATED COUNT: 16.7 % (ref 11.5–14.9)
GFR, ESTIMATED: 83 ML/MIN/1.73M2
GLUCOSE SERPL-MCNC: 178 MG/DL (ref 70–99)
HCT VFR BLD AUTO: 30 % (ref 36–46)
HGB BLD-MCNC: 9.7 G/DL (ref 12–16)
LYMPHOCYTES NFR BLD: 1.8 K/UL (ref 1–4.8)
LYMPHOCYTES RELATIVE PERCENT: 35 % (ref 24–44)
MCH RBC QN AUTO: 28.4 PG (ref 26–34)
MCHC RBC AUTO-ENTMCNC: 32.3 G/DL (ref 31–37)
MCV RBC AUTO: 88 FL (ref 80–100)
MONOCYTES NFR BLD: 0.7 K/UL (ref 0.1–1.3)
MONOCYTES NFR BLD: 14 % (ref 1–7)
NEUTROPHILS NFR BLD: 43 % (ref 36–66)
NEUTS SEG NFR BLD: 2.3 K/UL (ref 1.3–9.1)
PLATELET # BLD AUTO: 338 K/UL (ref 150–450)
PMV BLD AUTO: 7.9 FL (ref 6–12)
POTASSIUM SERPL-SCNC: 3.9 MMOL/L (ref 3.7–5.3)
PROT SERPL-MCNC: 6.1 G/DL (ref 6.4–8.3)
RBC # BLD AUTO: 3.41 M/UL (ref 4–5.2)
SODIUM SERPL-SCNC: 143 MMOL/L (ref 135–144)
WBC OTHER # BLD: 5.3 K/UL (ref 3.5–11)

## 2024-06-15 PROCEDURE — 6370000000 HC RX 637 (ALT 250 FOR IP): Performed by: INTERNAL MEDICINE

## 2024-06-15 PROCEDURE — 97535 SELF CARE MNGMENT TRAINING: CPT

## 2024-06-15 PROCEDURE — 99222 1ST HOSP IP/OBS MODERATE 55: CPT | Performed by: INTERNAL MEDICINE

## 2024-06-15 PROCEDURE — 6360000002 HC RX W HCPCS: Performed by: PHYSICAL MEDICINE & REHABILITATION

## 2024-06-15 PROCEDURE — 2580000003 HC RX 258: Performed by: INTERNAL MEDICINE

## 2024-06-15 PROCEDURE — 80048 BASIC METABOLIC PNL TOTAL CA: CPT

## 2024-06-15 PROCEDURE — 6360000002 HC RX W HCPCS: Performed by: INTERNAL MEDICINE

## 2024-06-15 PROCEDURE — 97530 THERAPEUTIC ACTIVITIES: CPT

## 2024-06-15 PROCEDURE — 97116 GAIT TRAINING THERAPY: CPT

## 2024-06-15 PROCEDURE — 92523 SPEECH SOUND LANG COMPREHEN: CPT

## 2024-06-15 PROCEDURE — 80076 HEPATIC FUNCTION PANEL: CPT

## 2024-06-15 PROCEDURE — 97110 THERAPEUTIC EXERCISES: CPT

## 2024-06-15 PROCEDURE — 6370000000 HC RX 637 (ALT 250 FOR IP): Performed by: PHYSICAL MEDICINE & REHABILITATION

## 2024-06-15 PROCEDURE — 1180000000 HC REHAB R&B

## 2024-06-15 PROCEDURE — 36415 COLL VENOUS BLD VENIPUNCTURE: CPT

## 2024-06-15 PROCEDURE — 85025 COMPLETE CBC W/AUTO DIFF WBC: CPT

## 2024-06-15 PROCEDURE — 2500000003 HC RX 250 WO HCPCS: Performed by: INTERNAL MEDICINE

## 2024-06-15 PROCEDURE — 97162 PT EVAL MOD COMPLEX 30 MIN: CPT

## 2024-06-15 PROCEDURE — 97166 OT EVAL MOD COMPLEX 45 MIN: CPT

## 2024-06-15 RX ADMIN — CEFTRIAXONE SODIUM 2000 MG: 2 INJECTION, POWDER, FOR SOLUTION INTRAMUSCULAR; INTRAVENOUS at 16:11

## 2024-06-15 RX ADMIN — ACETAMINOPHEN 650 MG: 325 TABLET ORAL at 17:43

## 2024-06-15 RX ADMIN — INSULIN LISPRO 2 UNITS: 100 INJECTION, SOLUTION INTRAVENOUS; SUBCUTANEOUS at 11:59

## 2024-06-15 RX ADMIN — HYDRALAZINE HYDROCHLORIDE 50 MG: 50 TABLET ORAL at 17:43

## 2024-06-15 RX ADMIN — NIFEDIPINE 30 MG: 30 TABLET, EXTENDED RELEASE ORAL at 20:27

## 2024-06-15 RX ADMIN — LISINOPRIL 40 MG: 20 TABLET ORAL at 09:54

## 2024-06-15 RX ADMIN — SPIRONOLACTONE 25 MG: 25 TABLET ORAL at 09:55

## 2024-06-15 RX ADMIN — ACETAMINOPHEN 650 MG: 325 TABLET ORAL at 09:54

## 2024-06-15 RX ADMIN — HYDRALAZINE HYDROCHLORIDE 50 MG: 50 TABLET ORAL at 23:24

## 2024-06-15 RX ADMIN — HYDRALAZINE HYDROCHLORIDE 50 MG: 50 TABLET ORAL at 00:34

## 2024-06-15 RX ADMIN — ANTI-FUNGAL POWDER MICONAZOLE NITRATE TALC FREE: 1.42 POWDER TOPICAL at 20:10

## 2024-06-15 RX ADMIN — INSULIN LISPRO 4 UNITS: 100 INJECTION, SOLUTION INTRAVENOUS; SUBCUTANEOUS at 20:10

## 2024-06-15 RX ADMIN — POLYETHYLENE GLYCOL 3350 17 G: 17 POWDER, FOR SOLUTION ORAL at 09:56

## 2024-06-15 RX ADMIN — ANTI-FUNGAL POWDER MICONAZOLE NITRATE TALC FREE: 1.42 POWDER TOPICAL at 10:01

## 2024-06-15 RX ADMIN — BUSPIRONE HYDROCHLORIDE 10 MG: 5 TABLET ORAL at 20:10

## 2024-06-15 RX ADMIN — HYDRALAZINE HYDROCHLORIDE 50 MG: 50 TABLET ORAL at 11:59

## 2024-06-15 RX ADMIN — METOPROLOL 100 MG: 100 TABLET ORAL at 09:54

## 2024-06-15 RX ADMIN — ATORVASTATIN CALCIUM 40 MG: 40 TABLET, FILM COATED ORAL at 20:09

## 2024-06-15 RX ADMIN — BUSPIRONE HYDROCHLORIDE 10 MG: 5 TABLET ORAL at 14:53

## 2024-06-15 RX ADMIN — ASPIRIN 81 MG: 81 TABLET, COATED ORAL at 09:54

## 2024-06-15 RX ADMIN — VENLAFAXINE HYDROCHLORIDE 75 MG: 75 CAPSULE, EXTENDED RELEASE ORAL at 09:55

## 2024-06-15 RX ADMIN — HYDRALAZINE HYDROCHLORIDE 50 MG: 50 TABLET ORAL at 05:50

## 2024-06-15 RX ADMIN — Medication 400 MG: at 09:55

## 2024-06-15 RX ADMIN — BUSPIRONE HYDROCHLORIDE 10 MG: 5 TABLET ORAL at 09:55

## 2024-06-15 RX ADMIN — INSULIN GLARGINE 20 UNITS: 100 INJECTION, SOLUTION SUBCUTANEOUS at 09:55

## 2024-06-15 RX ADMIN — DICLOFENAC SODIUM 2 G: 10 GEL TOPICAL at 17:44

## 2024-06-15 RX ADMIN — METOPROLOL 100 MG: 100 TABLET ORAL at 20:09

## 2024-06-15 RX ADMIN — ACETAMINOPHEN 650 MG: 325 TABLET ORAL at 23:24

## 2024-06-15 RX ADMIN — ENOXAPARIN SODIUM 40 MG: 100 INJECTION SUBCUTANEOUS at 09:53

## 2024-06-15 RX ADMIN — CEFTRIAXONE SODIUM 2000 MG: 2 INJECTION, POWDER, FOR SOLUTION INTRAMUSCULAR; INTRAVENOUS at 04:16

## 2024-06-15 ASSESSMENT — PAIN SCALES - GENERAL
PAINLEVEL_OUTOF10: 3
PAINLEVEL_OUTOF10: 7
PAINLEVEL_OUTOF10: 5
PAINLEVEL_OUTOF10: 6
PAINLEVEL_OUTOF10: 0
PAINLEVEL_OUTOF10: 5
PAINLEVEL_OUTOF10: 7
PAINLEVEL_OUTOF10: 0
PAINLEVEL_OUTOF10: 3

## 2024-06-15 ASSESSMENT — PAIN DESCRIPTION - FREQUENCY: FREQUENCY: INTERMITTENT

## 2024-06-15 ASSESSMENT — 9 HOLE PEG TEST
TESTTIME_SECONDS: 49.57
TEST_RESULT: IMPAIRED
TEST_RESULT: IMPAIRED
TESTTIME_SECONDS: 40.43

## 2024-06-15 ASSESSMENT — PAIN - FUNCTIONAL ASSESSMENT
PAIN_FUNCTIONAL_ASSESSMENT: ACTIVITIES ARE NOT PREVENTED

## 2024-06-15 ASSESSMENT — PAIN DESCRIPTION - LOCATION
LOCATION: NECK
LOCATION: NECK
LOCATION: HEAD
LOCATION: HEAD;NECK

## 2024-06-15 ASSESSMENT — PAIN DESCRIPTION - ORIENTATION
ORIENTATION: MID
ORIENTATION: RIGHT;LEFT;MID
ORIENTATION: MID
ORIENTATION: OTHER (COMMENT)

## 2024-06-15 ASSESSMENT — PAIN DESCRIPTION - DESCRIPTORS
DESCRIPTORS: ACHING

## 2024-06-15 ASSESSMENT — PAIN DESCRIPTION - PAIN TYPE: TYPE: ACUTE PAIN

## 2024-06-15 ASSESSMENT — PAIN DESCRIPTION - ONSET: ONSET: ON-GOING

## 2024-06-15 NOTE — PROGRESS NOTES
Report called to Luci at [facility]. Patient and family at bedside updated that patient will be transferred to room 2608.

## 2024-06-15 NOTE — H&P
Physical Medicine & Rehabilitation History and Physical  [facility] Acute Rehabilitation Unit     Primary care provider: Shaina Hamilton MD     Chief Complaint and Reason for Rehabilitation Admission:   Encephalopathy due to meningitis and Subacute infarct left frontal lobe    History of Present Illness:  Kavya De Santiago  is a 64 y.o. right-handed     female admitted to the [place] [facility] [facility] on 6/14/2024.         Pt with history of type 2 diabetes, hypertension hyperlipidemia, CKD, GERD, DIEGO, obesity admitted with change in mental status, nausea vomiting diarrhea.  She required intubation reportedly patient had some upper respiratory symptoms over the past week with dry cough decreased appetite rhinorrhea congestion headache and sinus pain.  She then began to have some diarrhea and vomiting..  EMS noted GCS of 89 decreased O2 sat.  She was intubated CT chest showed no PE CT abdomen pelvis showed pancolitis with abnormal wall thickening of the ascending transverse descending colon sigmoid colon and rectum she was started antibiotic she had elevated glucose influenza was negative     Critical care 6/2-acute hypoxic respite failure intubated 5/26 extubated 6/2, sepsis with H influenza bacteremia and meningitis question colitis has rectal tube, chronic back pain-sees neurology, neurosurgery and pain management Dr. Granados, obstruct sleep apnea no sleep study done never followed up in office nonmorbid obesity COVID-negative     GI-diarrhea, pancolitis stool cultures negative on empiric Vanco per ID, anemia no overt bleeding adequate iron stores trend H&H continue PPI, elevated LFT's liver ultrasound showing steatosis, workup pending, sepsis secondary to haemophilus influenza, GI signed off     ID-haemophilus influenza meningitis/bacteremia and sepsis, colitis meningitis panel was positive for haemophilus influenza, continue ceftriaxone every 12 hours for 15-day

## 2024-06-15 NOTE — DISCHARGE SUMMARY
IN-PATIENT SERVICE   [facility]    Discharge Summary     Patient ID: Kavya De Santiago  :  1960   MRN: [id number]     ACCOUNT:  [id number]   Patient's PCP: Shaina Hamilton MD  Admit Date: 2024   Discharge Date: 6/15/2024    Length of Stay: 19  Code Status:  Prior  Admitting Physician: Venu Lorenzo MD  Discharge Physician: Khoi Cruz MD     Active Discharge Diagnoses:     Primary Problem  Severe diarrhea      Hospital Problems  Active Hospital Problems    Diagnosis Date Noted    Acute encephalopathy [G93.40] 2022     Priority: Medium    Cerebrovascular accident (CVA) (HCC) [I63.9] 2024    Haemophilus influenzae meningitis [G00.0] 2024    Bacteremia [R78.81] 2024    Sepsis due to Haemophilus influenzae with acute respiratory failure without septic shock (HCC) [A41.3, R65.20, J96.00] 2024    Anemia [D64.9] 2024    Elevated LFTs [R79.89] 2024    Unresponsive state [R41.89] 2024    Meningoencephalitis [G04.90] 2024    History of TIA (transient ischemic attack) [Z86.73] 2024    Colitis [K52.9] 2024    Pancolitis (HCC) [K51.00] 2024    Fever [R50.9] 2024    Acute respiratory failure (HCC) [J96.00] 2024    Severe diarrhea [K52.9] 2024       Admission Condition:  fair     Discharged Condition: fair    Hospital Stay:     Hospital Course:  Kavya De Santiago is a 64 y.o. female who was admitted for the management of Severe diarrhea , presented to ER with Loss of Consciousness    64-year-old lady with multiple comorbid conditions admitted with altered mental status blood culture positive for H. influenzae diagnosed with H. influenzae meningitis sepsis admitted to ICU prolonged hospital stay due to above and deconditioning due to the empiric antibiotic discharged to acute rehab  Below his infectious disease input      Current:  Haemophilus influenza meningitis/    ondansetron 4 MG tablet  Commonly known as: ZOFRAN  Take 1 tablet by mouth daily as needed for Nausea or Vomiting     Prodigy No Coding Blood Gluc strip  Generic drug: blood glucose test strips  Use to test once daily and as needed as directed by provider. Please dispense Prodigy brand.     propranolol 80 MG extended release capsule  Commonly known as: INDERAL LA  take 1 capsule by mouth once daily     rOPINIRole 2 MG tablet  Commonly known as: REQUIP  take 2 tablets by mouth nightly     Synjardy 5-1000 MG Tabs  Generic drug: Empagliflozin-metFORMIN HCl  Take 1 tablet by mouth 2 times daily     venlafaxine 75 MG extended release capsule  Commonly known as: EFFEXOR XR  Take 1 capsule by mouth daily           * This list has 4 medication(s) that are the same as other medications prescribed for you. Read the directions carefully, and ask your doctor or other care provider to review them with you.                  Time Spent on discharge is  35 mins in patient examination, evaluation, counseling as well as medication reconciliation, prescriptions for required medications, discharge plan and follow up.    Electronically signed by   Khoi Cruz MD  6/15/2024  5:57 PM      Thank you Shaina Matthews MD for the opportunity to be involved in this patient's care.

## 2024-06-16 PROBLEM — E44.1 MILD MALNUTRITION (HCC): Status: ACTIVE | Noted: 2024-06-16

## 2024-06-16 PROCEDURE — 97112 NEUROMUSCULAR REEDUCATION: CPT

## 2024-06-16 PROCEDURE — 2580000003 HC RX 258: Performed by: INTERNAL MEDICINE

## 2024-06-16 PROCEDURE — 6360000002 HC RX W HCPCS: Performed by: INTERNAL MEDICINE

## 2024-06-16 PROCEDURE — 97535 SELF CARE MNGMENT TRAINING: CPT

## 2024-06-16 PROCEDURE — 1180000000 HC REHAB R&B

## 2024-06-16 PROCEDURE — 97530 THERAPEUTIC ACTIVITIES: CPT

## 2024-06-16 PROCEDURE — 2580000003 HC RX 258: Performed by: NURSE PRACTITIONER

## 2024-06-16 PROCEDURE — 6360000002 HC RX W HCPCS: Performed by: PHYSICAL MEDICINE & REHABILITATION

## 2024-06-16 PROCEDURE — 97110 THERAPEUTIC EXERCISES: CPT

## 2024-06-16 PROCEDURE — 6370000000 HC RX 637 (ALT 250 FOR IP): Performed by: PHYSICAL MEDICINE & REHABILITATION

## 2024-06-16 PROCEDURE — 97116 GAIT TRAINING THERAPY: CPT

## 2024-06-16 PROCEDURE — 99232 SBSQ HOSP IP/OBS MODERATE 35: CPT | Performed by: INTERNAL MEDICINE

## 2024-06-16 PROCEDURE — 6370000000 HC RX 637 (ALT 250 FOR IP): Performed by: INTERNAL MEDICINE

## 2024-06-16 RX ORDER — SODIUM CHLORIDE 0.9 % (FLUSH) 0.9 %
5-40 SYRINGE (ML) INJECTION 2 TIMES DAILY
Status: DISCONTINUED | OUTPATIENT
Start: 2024-06-16 | End: 2024-07-03 | Stop reason: HOSPADM

## 2024-06-16 RX ORDER — LACTOBACILLUS RHAMNOSUS GG 10B CELL
1 CAPSULE ORAL
Status: DISCONTINUED | OUTPATIENT
Start: 2024-06-17 | End: 2024-07-03 | Stop reason: HOSPADM

## 2024-06-16 RX ORDER — LATANOPROST 50 UG/ML
1 SOLUTION/ DROPS OPHTHALMIC NIGHTLY
Status: DISCONTINUED | OUTPATIENT
Start: 2024-06-16 | End: 2024-07-03 | Stop reason: HOSPADM

## 2024-06-16 RX ORDER — ROPINIROLE 1 MG/1
2 TABLET, FILM COATED ORAL NIGHTLY
Status: DISCONTINUED | OUTPATIENT
Start: 2024-06-17 | End: 2024-07-03 | Stop reason: HOSPADM

## 2024-06-16 RX ADMIN — ACETAMINOPHEN 650 MG: 325 TABLET ORAL at 20:20

## 2024-06-16 RX ADMIN — INSULIN LISPRO 2 UNITS: 100 INJECTION, SOLUTION INTRAVENOUS; SUBCUTANEOUS at 14:09

## 2024-06-16 RX ADMIN — LISINOPRIL 40 MG: 20 TABLET ORAL at 09:56

## 2024-06-16 RX ADMIN — METOPROLOL 100 MG: 100 TABLET ORAL at 09:57

## 2024-06-16 RX ADMIN — BUSPIRONE HYDROCHLORIDE 10 MG: 5 TABLET ORAL at 14:09

## 2024-06-16 RX ADMIN — VENLAFAXINE HYDROCHLORIDE 75 MG: 75 CAPSULE, EXTENDED RELEASE ORAL at 09:56

## 2024-06-16 RX ADMIN — ANTI-FUNGAL POWDER MICONAZOLE NITRATE TALC FREE: 1.42 POWDER TOPICAL at 20:21

## 2024-06-16 RX ADMIN — BUSPIRONE HYDROCHLORIDE 10 MG: 5 TABLET ORAL at 09:58

## 2024-06-16 RX ADMIN — LATANOPROST 1 DROP: 50 SOLUTION OPHTHALMIC at 22:28

## 2024-06-16 RX ADMIN — BUSPIRONE HYDROCHLORIDE 10 MG: 5 TABLET ORAL at 20:21

## 2024-06-16 RX ADMIN — HYDRALAZINE HYDROCHLORIDE 50 MG: 50 TABLET ORAL at 14:08

## 2024-06-16 RX ADMIN — NIFEDIPINE 30 MG: 30 TABLET, EXTENDED RELEASE ORAL at 20:20

## 2024-06-16 RX ADMIN — INSULIN GLARGINE 20 UNITS: 100 INJECTION, SOLUTION SUBCUTANEOUS at 09:58

## 2024-06-16 RX ADMIN — ATORVASTATIN CALCIUM 40 MG: 40 TABLET, FILM COATED ORAL at 20:20

## 2024-06-16 RX ADMIN — HYDRALAZINE HYDROCHLORIDE 50 MG: 50 TABLET ORAL at 05:41

## 2024-06-16 RX ADMIN — Medication 400 MG: at 09:56

## 2024-06-16 RX ADMIN — SODIUM CHLORIDE, PRESERVATIVE FREE 10 ML: 5 INJECTION INTRAVENOUS at 20:20

## 2024-06-16 RX ADMIN — CEFTRIAXONE SODIUM 2000 MG: 2 INJECTION, POWDER, FOR SOLUTION INTRAMUSCULAR; INTRAVENOUS at 04:47

## 2024-06-16 RX ADMIN — ENOXAPARIN SODIUM 40 MG: 100 INJECTION SUBCUTANEOUS at 09:56

## 2024-06-16 RX ADMIN — ACETAMINOPHEN 650 MG: 325 TABLET ORAL at 14:14

## 2024-06-16 RX ADMIN — METOPROLOL 100 MG: 100 TABLET ORAL at 20:21

## 2024-06-16 RX ADMIN — INSULIN LISPRO 2 UNITS: 100 INJECTION, SOLUTION INTRAVENOUS; SUBCUTANEOUS at 21:00

## 2024-06-16 RX ADMIN — ANTI-FUNGAL POWDER MICONAZOLE NITRATE TALC FREE: 1.42 POWDER TOPICAL at 09:57

## 2024-06-16 RX ADMIN — CEFTRIAXONE SODIUM 2000 MG: 2 INJECTION, POWDER, FOR SOLUTION INTRAMUSCULAR; INTRAVENOUS at 17:32

## 2024-06-16 RX ADMIN — SPIRONOLACTONE 25 MG: 25 TABLET ORAL at 09:56

## 2024-06-16 RX ADMIN — HYDRALAZINE HYDROCHLORIDE 50 MG: 50 TABLET ORAL at 17:29

## 2024-06-16 RX ADMIN — ASPIRIN 81 MG: 81 TABLET, COATED ORAL at 09:56

## 2024-06-16 RX ADMIN — DICLOFENAC SODIUM 2 G: 10 GEL TOPICAL at 20:18

## 2024-06-16 ASSESSMENT — PAIN DESCRIPTION - LOCATION
LOCATION: KNEE;LEG;NECK
LOCATION: HEAD
LOCATION: HEAD

## 2024-06-16 ASSESSMENT — PAIN SCALES - GENERAL
PAINLEVEL_OUTOF10: 0
PAINLEVEL_OUTOF10: 0
PAINLEVEL_OUTOF10: 5
PAINLEVEL_OUTOF10: 5
PAINLEVEL_OUTOF10: 4
PAINLEVEL_OUTOF10: 0

## 2024-06-16 ASSESSMENT — PAIN DESCRIPTION - DESCRIPTORS
DESCRIPTORS: ACHING;SORE
DESCRIPTORS: ACHING;SORE
DESCRIPTORS: THROBBING

## 2024-06-16 ASSESSMENT — PAIN DESCRIPTION - ORIENTATION
ORIENTATION: MID;ANTERIOR;POSTERIOR
ORIENTATION: MID;ANTERIOR;POSTERIOR

## 2024-06-16 ASSESSMENT — PAIN - FUNCTIONAL ASSESSMENT
PAIN_FUNCTIONAL_ASSESSMENT: ACTIVITIES ARE NOT PREVENTED

## 2024-06-16 ASSESSMENT — PAIN SCALES - WONG BAKER: WONGBAKER_NUMERICALRESPONSE: NO HURT

## 2024-06-16 ASSESSMENT — PAIN DESCRIPTION - FREQUENCY
FREQUENCY: INTERMITTENT
FREQUENCY: INTERMITTENT

## 2024-06-16 ASSESSMENT — PAIN DESCRIPTION - PAIN TYPE
TYPE: ACUTE PAIN
TYPE: ACUTE PAIN

## 2024-06-16 ASSESSMENT — PAIN DESCRIPTION - ONSET: ONSET: ON-GOING

## 2024-06-17 PROCEDURE — 99232 SBSQ HOSP IP/OBS MODERATE 35: CPT | Performed by: INTERNAL MEDICINE

## 2024-06-17 PROCEDURE — 97130 THER IVNTJ EA ADDL 15 MIN: CPT

## 2024-06-17 PROCEDURE — 99232 SBSQ HOSP IP/OBS MODERATE 35: CPT | Performed by: STUDENT IN AN ORGANIZED HEALTH CARE EDUCATION/TRAINING PROGRAM

## 2024-06-17 PROCEDURE — 97110 THERAPEUTIC EXERCISES: CPT

## 2024-06-17 PROCEDURE — 2580000003 HC RX 258: Performed by: INTERNAL MEDICINE

## 2024-06-17 PROCEDURE — 1180000000 HC REHAB R&B

## 2024-06-17 PROCEDURE — 6370000000 HC RX 637 (ALT 250 FOR IP): Performed by: INTERNAL MEDICINE

## 2024-06-17 PROCEDURE — 6360000002 HC RX W HCPCS: Performed by: PHYSICAL MEDICINE & REHABILITATION

## 2024-06-17 PROCEDURE — 6360000002 HC RX W HCPCS: Performed by: INTERNAL MEDICINE

## 2024-06-17 PROCEDURE — 97116 GAIT TRAINING THERAPY: CPT

## 2024-06-17 PROCEDURE — 2580000003 HC RX 258: Performed by: NURSE PRACTITIONER

## 2024-06-17 PROCEDURE — 97129 THER IVNTJ 1ST 15 MIN: CPT

## 2024-06-17 PROCEDURE — 97530 THERAPEUTIC ACTIVITIES: CPT

## 2024-06-17 PROCEDURE — 6370000000 HC RX 637 (ALT 250 FOR IP): Performed by: PHYSICAL MEDICINE & REHABILITATION

## 2024-06-17 RX ORDER — HYDRALAZINE HYDROCHLORIDE 50 MG/1
100 TABLET, FILM COATED ORAL EVERY 8 HOURS SCHEDULED
Status: DISCONTINUED | OUTPATIENT
Start: 2024-06-17 | End: 2024-06-20

## 2024-06-17 RX ADMIN — LISINOPRIL 40 MG: 20 TABLET ORAL at 08:25

## 2024-06-17 RX ADMIN — ACETAMINOPHEN 650 MG: 325 TABLET ORAL at 11:12

## 2024-06-17 RX ADMIN — ANTI-FUNGAL POWDER MICONAZOLE NITRATE TALC FREE: 1.42 POWDER TOPICAL at 08:27

## 2024-06-17 RX ADMIN — SODIUM CHLORIDE, PRESERVATIVE FREE 10 ML: 5 INJECTION INTRAVENOUS at 20:30

## 2024-06-17 RX ADMIN — Medication 400 MG: at 08:26

## 2024-06-17 RX ADMIN — Medication 1 CAPSULE: at 08:26

## 2024-06-17 RX ADMIN — ANTI-FUNGAL POWDER MICONAZOLE NITRATE TALC FREE: 1.42 POWDER TOPICAL at 20:27

## 2024-06-17 RX ADMIN — METOPROLOL 100 MG: 100 TABLET ORAL at 20:27

## 2024-06-17 RX ADMIN — BUSPIRONE HYDROCHLORIDE 10 MG: 5 TABLET ORAL at 20:26

## 2024-06-17 RX ADMIN — HYDRALAZINE HYDROCHLORIDE 100 MG: 50 TABLET ORAL at 20:26

## 2024-06-17 RX ADMIN — HYDRALAZINE HYDROCHLORIDE 50 MG: 50 TABLET ORAL at 12:04

## 2024-06-17 RX ADMIN — INSULIN GLARGINE 20 UNITS: 100 INJECTION, SOLUTION SUBCUTANEOUS at 08:25

## 2024-06-17 RX ADMIN — BUSPIRONE HYDROCHLORIDE 10 MG: 5 TABLET ORAL at 08:26

## 2024-06-17 RX ADMIN — ENOXAPARIN SODIUM 40 MG: 100 INJECTION SUBCUTANEOUS at 08:25

## 2024-06-17 RX ADMIN — INSULIN LISPRO 4 UNITS: 100 INJECTION, SOLUTION INTRAVENOUS; SUBCUTANEOUS at 12:04

## 2024-06-17 RX ADMIN — SODIUM CHLORIDE, PRESERVATIVE FREE 10 ML: 5 INJECTION INTRAVENOUS at 08:27

## 2024-06-17 RX ADMIN — CEFTRIAXONE SODIUM 2000 MG: 2 INJECTION, POWDER, FOR SOLUTION INTRAMUSCULAR; INTRAVENOUS at 04:33

## 2024-06-17 RX ADMIN — INSULIN LISPRO 2 UNITS: 100 INJECTION, SOLUTION INTRAVENOUS; SUBCUTANEOUS at 17:28

## 2024-06-17 RX ADMIN — HYDRALAZINE HYDROCHLORIDE 50 MG: 50 TABLET ORAL at 05:56

## 2024-06-17 RX ADMIN — ACETAMINOPHEN 650 MG: 325 TABLET ORAL at 20:40

## 2024-06-17 RX ADMIN — ASPIRIN 81 MG: 81 TABLET, COATED ORAL at 08:26

## 2024-06-17 RX ADMIN — NIFEDIPINE 30 MG: 30 TABLET, EXTENDED RELEASE ORAL at 20:35

## 2024-06-17 RX ADMIN — ROPINIROLE HYDROCHLORIDE 2 MG: 1 TABLET, FILM COATED ORAL at 20:26

## 2024-06-17 RX ADMIN — ATORVASTATIN CALCIUM 40 MG: 40 TABLET, FILM COATED ORAL at 20:26

## 2024-06-17 RX ADMIN — METOPROLOL 100 MG: 100 TABLET ORAL at 08:26

## 2024-06-17 RX ADMIN — SPIRONOLACTONE 25 MG: 25 TABLET ORAL at 08:26

## 2024-06-17 RX ADMIN — HYDRALAZINE HYDROCHLORIDE 100 MG: 50 TABLET ORAL at 15:19

## 2024-06-17 RX ADMIN — LATANOPROST 1 DROP: 50 SOLUTION OPHTHALMIC at 20:28

## 2024-06-17 RX ADMIN — INSULIN LISPRO 2 UNITS: 100 INJECTION, SOLUTION INTRAVENOUS; SUBCUTANEOUS at 20:47

## 2024-06-17 RX ADMIN — DICLOFENAC SODIUM 2 G: 10 GEL TOPICAL at 20:42

## 2024-06-17 RX ADMIN — HYDRALAZINE HYDROCHLORIDE 50 MG: 50 TABLET ORAL at 00:57

## 2024-06-17 RX ADMIN — BUSPIRONE HYDROCHLORIDE 10 MG: 5 TABLET ORAL at 15:19

## 2024-06-17 RX ADMIN — VENLAFAXINE HYDROCHLORIDE 75 MG: 75 CAPSULE, EXTENDED RELEASE ORAL at 08:26

## 2024-06-17 ASSESSMENT — PAIN DESCRIPTION - LOCATION
LOCATION: NECK
LOCATION: NECK
LOCATION: HEAD
LOCATION: NECK

## 2024-06-17 ASSESSMENT — PAIN DESCRIPTION - ORIENTATION
ORIENTATION: POSTERIOR
ORIENTATION: ANTERIOR;POSTERIOR
ORIENTATION: MID

## 2024-06-17 ASSESSMENT — PAIN DESCRIPTION - DIRECTION: RADIATING_TOWARDS: HEAD/NECK

## 2024-06-17 ASSESSMENT — PAIN DESCRIPTION - DESCRIPTORS
DESCRIPTORS: ACHING

## 2024-06-17 ASSESSMENT — PAIN DESCRIPTION - FREQUENCY
FREQUENCY: INTERMITTENT
FREQUENCY: INTERMITTENT

## 2024-06-17 ASSESSMENT — PAIN SCALES - GENERAL
PAINLEVEL_OUTOF10: 7
PAINLEVEL_OUTOF10: 7
PAINLEVEL_OUTOF10: 6
PAINLEVEL_OUTOF10: 7
PAINLEVEL_OUTOF10: 7
PAINLEVEL_OUTOF10: 6
PAINLEVEL_OUTOF10: 7

## 2024-06-17 ASSESSMENT — PAIN - FUNCTIONAL ASSESSMENT
PAIN_FUNCTIONAL_ASSESSMENT: ACTIVITIES ARE NOT PREVENTED

## 2024-06-17 ASSESSMENT — PAIN DESCRIPTION - ONSET
ONSET: ON-GOING
ONSET: PROGRESSIVE

## 2024-06-17 ASSESSMENT — PAIN DESCRIPTION - PAIN TYPE
TYPE: ACUTE PAIN
TYPE: ACUTE PAIN

## 2024-06-17 NOTE — INTERDISCIPLINARY ROUNDS
[facility] Acute Inpatient Rehabilitation  INTERDISCIPLINARY MEETING  Date: 24  Patient Name: Kavya De Santiago       Room: 2608/2608-01  MRN: [id number]       : 1960  (64 y.o.)     Gender: female     Patient's care team, including nursing, speech language pathologist, occupational therapist, and/or physical therapist, met to discuss patient's care needs. Any patient concerns, change in medical status, and current transfer/mobility status were identified at this time.     Any Additional Pertinent Information:   Patient is 1 person assistance with RW for nursing staff.    Participating Team Members:  Nurse: Staci Lima RN    Occupational Therapist: Caroline Brown OT   Physical Therapist: Eda Johnson PT  Speech Therapist:  N/A

## 2024-06-18 ENCOUNTER — TELEPHONE (OUTPATIENT)
Dept: FAMILY MEDICINE CLINIC | Age: 64
End: 2024-06-18

## 2024-06-18 ENCOUNTER — APPOINTMENT (OUTPATIENT)
Dept: CT IMAGING | Age: 64
DRG: 071 | End: 2024-06-18
Attending: PHYSICAL MEDICINE & REHABILITATION
Payer: COMMERCIAL

## 2024-06-18 LAB
ANION GAP SERPL CALCULATED.3IONS-SCNC: 8 MMOL/L (ref 9–17)
BUN SERPL-MCNC: 11 MG/DL (ref 8–23)
CALCIUM SERPL-MCNC: 8.4 MG/DL (ref 8.6–10.4)
CHLORIDE SERPL-SCNC: 105 MMOL/L (ref 98–107)
CO2 SERPL-SCNC: 26 MMOL/L (ref 20–31)
CREAT SERPL-MCNC: 0.7 MG/DL (ref 0.5–0.9)
GFR, ESTIMATED: >90 ML/MIN/1.73M2
GLUCOSE BLD-MCNC: 228 MG/DL (ref 65–105)
GLUCOSE SERPL-MCNC: 237 MG/DL (ref 70–99)
MAGNESIUM SERPL-MCNC: 1.5 MG/DL (ref 1.6–2.6)
POTASSIUM SERPL-SCNC: 4.2 MMOL/L (ref 3.7–5.3)
SODIUM SERPL-SCNC: 139 MMOL/L (ref 135–144)

## 2024-06-18 PROCEDURE — 82947 ASSAY GLUCOSE BLOOD QUANT: CPT

## 2024-06-18 PROCEDURE — 36415 COLL VENOUS BLD VENIPUNCTURE: CPT

## 2024-06-18 PROCEDURE — 6370000000 HC RX 637 (ALT 250 FOR IP): Performed by: INTERNAL MEDICINE

## 2024-06-18 PROCEDURE — 6370000000 HC RX 637 (ALT 250 FOR IP): Performed by: PHYSICAL MEDICINE & REHABILITATION

## 2024-06-18 PROCEDURE — 97130 THER IVNTJ EA ADDL 15 MIN: CPT

## 2024-06-18 PROCEDURE — 2580000003 HC RX 258: Performed by: NURSE PRACTITIONER

## 2024-06-18 PROCEDURE — 97535 SELF CARE MNGMENT TRAINING: CPT

## 2024-06-18 PROCEDURE — 6360000002 HC RX W HCPCS: Performed by: PHYSICAL MEDICINE & REHABILITATION

## 2024-06-18 PROCEDURE — 97110 THERAPEUTIC EXERCISES: CPT

## 2024-06-18 PROCEDURE — 6370000000 HC RX 637 (ALT 250 FOR IP): Performed by: STUDENT IN AN ORGANIZED HEALTH CARE EDUCATION/TRAINING PROGRAM

## 2024-06-18 PROCEDURE — 99232 SBSQ HOSP IP/OBS MODERATE 35: CPT | Performed by: INTERNAL MEDICINE

## 2024-06-18 PROCEDURE — 6360000002 HC RX W HCPCS: Performed by: STUDENT IN AN ORGANIZED HEALTH CARE EDUCATION/TRAINING PROGRAM

## 2024-06-18 PROCEDURE — 83735 ASSAY OF MAGNESIUM: CPT

## 2024-06-18 PROCEDURE — 97129 THER IVNTJ 1ST 15 MIN: CPT

## 2024-06-18 PROCEDURE — 99232 SBSQ HOSP IP/OBS MODERATE 35: CPT | Performed by: STUDENT IN AN ORGANIZED HEALTH CARE EDUCATION/TRAINING PROGRAM

## 2024-06-18 PROCEDURE — 97530 THERAPEUTIC ACTIVITIES: CPT

## 2024-06-18 PROCEDURE — 2580000003 HC RX 258: Performed by: STUDENT IN AN ORGANIZED HEALTH CARE EDUCATION/TRAINING PROGRAM

## 2024-06-18 PROCEDURE — 1180000000 HC REHAB R&B

## 2024-06-18 PROCEDURE — 70450 CT HEAD/BRAIN W/O DYE: CPT

## 2024-06-18 PROCEDURE — 80048 BASIC METABOLIC PNL TOTAL CA: CPT

## 2024-06-18 RX ORDER — OXYCODONE HYDROCHLORIDE 5 MG/1
5 TABLET ORAL EVERY 6 HOURS PRN
Status: DISCONTINUED | OUTPATIENT
Start: 2024-06-18 | End: 2024-06-21

## 2024-06-18 RX ORDER — INSULIN GLARGINE 100 [IU]/ML
22 INJECTION, SOLUTION SUBCUTANEOUS DAILY
Status: DISCONTINUED | OUTPATIENT
Start: 2024-06-19 | End: 2024-06-22

## 2024-06-18 RX ADMIN — BUSPIRONE HYDROCHLORIDE 10 MG: 5 TABLET ORAL at 14:55

## 2024-06-18 RX ADMIN — HYDRALAZINE HYDROCHLORIDE 100 MG: 50 TABLET ORAL at 14:54

## 2024-06-18 RX ADMIN — ONDANSETRON 4 MG: 4 TABLET, ORALLY DISINTEGRATING ORAL at 02:03

## 2024-06-18 RX ADMIN — SPIRONOLACTONE 25 MG: 25 TABLET ORAL at 07:45

## 2024-06-18 RX ADMIN — VENLAFAXINE HYDROCHLORIDE 75 MG: 75 CAPSULE, EXTENDED RELEASE ORAL at 07:46

## 2024-06-18 RX ADMIN — DICLOFENAC SODIUM 2 G: 10 GEL TOPICAL at 16:38

## 2024-06-18 RX ADMIN — ANTI-FUNGAL POWDER MICONAZOLE NITRATE TALC FREE: 1.42 POWDER TOPICAL at 21:21

## 2024-06-18 RX ADMIN — INSULIN LISPRO 4 UNITS: 100 INJECTION, SOLUTION INTRAVENOUS; SUBCUTANEOUS at 12:24

## 2024-06-18 RX ADMIN — METOPROLOL 100 MG: 100 TABLET ORAL at 07:45

## 2024-06-18 RX ADMIN — ANTI-FUNGAL POWDER MICONAZOLE NITRATE TALC FREE: 1.42 POWDER TOPICAL at 07:47

## 2024-06-18 RX ADMIN — HYDRALAZINE HYDROCHLORIDE 100 MG: 50 TABLET ORAL at 06:09

## 2024-06-18 RX ADMIN — ROPINIROLE HYDROCHLORIDE 2 MG: 1 TABLET, FILM COATED ORAL at 21:14

## 2024-06-18 RX ADMIN — BUSPIRONE HYDROCHLORIDE 10 MG: 5 TABLET ORAL at 21:14

## 2024-06-18 RX ADMIN — INSULIN LISPRO 2 UNITS: 100 INJECTION, SOLUTION INTRAVENOUS; SUBCUTANEOUS at 06:33

## 2024-06-18 RX ADMIN — SODIUM CHLORIDE, PRESERVATIVE FREE 10 ML: 5 INJECTION INTRAVENOUS at 21:20

## 2024-06-18 RX ADMIN — METOPROLOL 100 MG: 100 TABLET ORAL at 21:14

## 2024-06-18 RX ADMIN — INSULIN LISPRO 2 UNITS: 100 INJECTION, SOLUTION INTRAVENOUS; SUBCUTANEOUS at 21:26

## 2024-06-18 RX ADMIN — ASPIRIN 81 MG: 81 TABLET, COATED ORAL at 07:46

## 2024-06-18 RX ADMIN — SODIUM CHLORIDE, PRESERVATIVE FREE 10 ML: 5 INJECTION INTRAVENOUS at 07:48

## 2024-06-18 RX ADMIN — HYDRALAZINE HYDROCHLORIDE 100 MG: 50 TABLET ORAL at 21:14

## 2024-06-18 RX ADMIN — DICLOFENAC SODIUM 2 G: 10 GEL TOPICAL at 07:47

## 2024-06-18 RX ADMIN — ENOXAPARIN SODIUM 40 MG: 100 INJECTION SUBCUTANEOUS at 07:45

## 2024-06-18 RX ADMIN — LISINOPRIL 40 MG: 20 TABLET ORAL at 07:45

## 2024-06-18 RX ADMIN — ACETAMINOPHEN 650 MG: 325 TABLET ORAL at 07:46

## 2024-06-18 RX ADMIN — Medication 1 CAPSULE: at 07:45

## 2024-06-18 RX ADMIN — CEFTRIAXONE SODIUM 2000 MG: 2 INJECTION, POWDER, FOR SOLUTION INTRAMUSCULAR; INTRAVENOUS at 06:09

## 2024-06-18 RX ADMIN — LATANOPROST 1 DROP: 50 SOLUTION OPHTHALMIC at 21:21

## 2024-06-18 RX ADMIN — NIFEDIPINE 30 MG: 30 TABLET, EXTENDED RELEASE ORAL at 21:14

## 2024-06-18 RX ADMIN — ATORVASTATIN CALCIUM 40 MG: 40 TABLET, FILM COATED ORAL at 21:14

## 2024-06-18 RX ADMIN — ACETAMINOPHEN 650 MG: 325 TABLET ORAL at 02:03

## 2024-06-18 RX ADMIN — INSULIN LISPRO 2 UNITS: 100 INJECTION, SOLUTION INTRAVENOUS; SUBCUTANEOUS at 16:37

## 2024-06-18 RX ADMIN — INSULIN GLARGINE 20 UNITS: 100 INJECTION, SOLUTION SUBCUTANEOUS at 07:45

## 2024-06-18 RX ADMIN — BUSPIRONE HYDROCHLORIDE 10 MG: 5 TABLET ORAL at 07:45

## 2024-06-18 RX ADMIN — OXYCODONE HYDROCHLORIDE 5 MG: 5 TABLET ORAL at 16:37

## 2024-06-18 RX ADMIN — Medication 400 MG: at 07:45

## 2024-06-18 ASSESSMENT — PAIN DESCRIPTION - LOCATION
LOCATION: HEAD;NECK
LOCATION: NECK;HEAD
LOCATION: HEAD;NECK

## 2024-06-18 ASSESSMENT — PAIN - FUNCTIONAL ASSESSMENT
PAIN_FUNCTIONAL_ASSESSMENT: PREVENTS OR INTERFERES SOME ACTIVE ACTIVITIES AND ADLS
PAIN_FUNCTIONAL_ASSESSMENT: ACTIVITIES ARE NOT PREVENTED
PAIN_FUNCTIONAL_ASSESSMENT: ACTIVITIES ARE NOT PREVENTED
PAIN_FUNCTIONAL_ASSESSMENT: PREVENTS OR INTERFERES SOME ACTIVE ACTIVITIES AND ADLS

## 2024-06-18 ASSESSMENT — PAIN DESCRIPTION - ORIENTATION
ORIENTATION: MID;POSTERIOR;LOWER
ORIENTATION: ANTERIOR;POSTERIOR
ORIENTATION: MID;LOWER

## 2024-06-18 ASSESSMENT — PAIN DESCRIPTION - DESCRIPTORS
DESCRIPTORS: ACHING
DESCRIPTORS: ACHING
DESCRIPTORS: THROBBING

## 2024-06-18 ASSESSMENT — PAIN SCALES - GENERAL
PAINLEVEL_OUTOF10: 9
PAINLEVEL_OUTOF10: 9
PAINLEVEL_OUTOF10: 4
PAINLEVEL_OUTOF10: 5
PAINLEVEL_OUTOF10: 8
PAINLEVEL_OUTOF10: 10
PAINLEVEL_OUTOF10: 5

## 2024-06-18 ASSESSMENT — PAIN DESCRIPTION - ONSET
ONSET: ON-GOING
ONSET: ON-GOING

## 2024-06-18 ASSESSMENT — PAIN DESCRIPTION - FREQUENCY
FREQUENCY: INTERMITTENT
FREQUENCY: INTERMITTENT

## 2024-06-18 ASSESSMENT — PAIN DESCRIPTION - PAIN TYPE: TYPE: ACUTE PAIN

## 2024-06-18 NOTE — TELEPHONE ENCOUNTER
Care Transitions Initial Follow Up Call    Outreach made within 2 business days of discharge: Yes    Patient: Kavya De Santiago Patient : 1960   MRN: [id number]  Reason for Admission: There are no discharge diagnoses documented for the most recent discharge.  Discharge Date: 24       Spoke with: SEVERO    No future appointments.    Kelsy Marin MA

## 2024-06-18 NOTE — CARE COORDINATION
[facility] Acute Inpatient Rehabilitation  Case Management Assessment  Initial Evaluation    Date/Time of Evaluation: 6/18/2024 8:09 AM  Assessment Completed by: Terrie Johnson RN    If patient is discharged prior to next notation, then this note serves as note for discharge by case management.    Patient Name: Kavya De Santiago                     Date / Time: 6/14/2024  8:59 PM  YOB: 1960  Diagnosis: Meningitis [G03.9]  Encephalopathy [G93.40]                     Patient Admission Status: REHAB IP   Readmission Risk (Low < 19, Mod (19-27), High > 27): Readmission Risk Score: 21.4      Current PCP: Shaina Hamilton MD  PCP verified by CM? Yes  Chart Reviewed: Yes      History Provided by: Patient  Patient Orientation: Alert and Oriented    Patient Cognition: Alert    Advance Directives:    Code Status: Prior   Patient's Primary Decision Maker is: Legal Next of Kin      Current Services Prior to Admission:  Current Financial resources: HELP  Current community resources: None  Current services prior to admission: Durable Medical Equipment  Type of Home Care services:  None  Current Home DME: Cane, Walker  Do you have a wheelchair ramp/Plans to build? No  Handicap Placard: Needs    Discharge Planning:  Patient lives with: Spouse/Significant Other   Type of Home: Apartment  Primary Care Giver: Self  Marital Status:   Patient Support Systems include: Spouse/Significant Other, Children   Family can provide assistance at DC: Yes  Does patient have 24 hour assistance at home:  Yes  Is patient agreeable to VNS or Outpatient therapy Yes  [place] of choice provided: Yes  List of Home Care Agencies/Outpatient therapy provided: No  VNS/Outpatient therapy chosen: No    Does patient go to outpatient dialysis: No  If yes, location and chair time: n/a    Would you like Case Management to discuss the discharge plan with any other family members/significant others, and if so, who? No  Plans to

## 2024-06-19 PROBLEM — R51.9 INTRACTABLE HEADACHE: Status: ACTIVE | Noted: 2024-06-19

## 2024-06-19 PROBLEM — Z86.61 HX OF MENINGITIS: Status: ACTIVE | Noted: 2024-06-19

## 2024-06-19 LAB
EKG ATRIAL RATE: 60 BPM
EKG P AXIS: 47 DEGREES
EKG P-R INTERVAL: 148 MS
EKG Q-T INTERVAL: 494 MS
EKG QRS DURATION: 70 MS
EKG QTC CALCULATION (BAZETT): 494 MS
EKG R AXIS: -18 DEGREES
EKG T AXIS: -12 DEGREES
EKG VENTRICULAR RATE: 60 BPM

## 2024-06-19 PROCEDURE — 97116 GAIT TRAINING THERAPY: CPT

## 2024-06-19 PROCEDURE — 6370000000 HC RX 637 (ALT 250 FOR IP): Performed by: INTERNAL MEDICINE

## 2024-06-19 PROCEDURE — 2580000003 HC RX 258: Performed by: PSYCHIATRY & NEUROLOGY

## 2024-06-19 PROCEDURE — 99232 SBSQ HOSP IP/OBS MODERATE 35: CPT | Performed by: INTERNAL MEDICINE

## 2024-06-19 PROCEDURE — 99222 1ST HOSP IP/OBS MODERATE 55: CPT | Performed by: PSYCHIATRY & NEUROLOGY

## 2024-06-19 PROCEDURE — 6360000002 HC RX W HCPCS: Performed by: STUDENT IN AN ORGANIZED HEALTH CARE EDUCATION/TRAINING PROGRAM

## 2024-06-19 PROCEDURE — 97129 THER IVNTJ 1ST 15 MIN: CPT

## 2024-06-19 PROCEDURE — 2580000003 HC RX 258: Performed by: NURSE PRACTITIONER

## 2024-06-19 PROCEDURE — 6360000002 HC RX W HCPCS: Performed by: PSYCHIATRY & NEUROLOGY

## 2024-06-19 PROCEDURE — 6370000000 HC RX 637 (ALT 250 FOR IP): Performed by: PHYSICAL MEDICINE & REHABILITATION

## 2024-06-19 PROCEDURE — 97530 THERAPEUTIC ACTIVITIES: CPT

## 2024-06-19 PROCEDURE — 1180000000 HC REHAB R&B

## 2024-06-19 PROCEDURE — 97130 THER IVNTJ EA ADDL 15 MIN: CPT

## 2024-06-19 PROCEDURE — 6360000002 HC RX W HCPCS: Performed by: PHYSICAL MEDICINE & REHABILITATION

## 2024-06-19 PROCEDURE — 97535 SELF CARE MNGMENT TRAINING: CPT

## 2024-06-19 PROCEDURE — 97110 THERAPEUTIC EXERCISES: CPT

## 2024-06-19 PROCEDURE — 2580000003 HC RX 258: Performed by: STUDENT IN AN ORGANIZED HEALTH CARE EDUCATION/TRAINING PROGRAM

## 2024-06-19 PROCEDURE — 6370000000 HC RX 637 (ALT 250 FOR IP): Performed by: STUDENT IN AN ORGANIZED HEALTH CARE EDUCATION/TRAINING PROGRAM

## 2024-06-19 RX ORDER — KETOROLAC TROMETHAMINE 30 MG/ML
15 INJECTION, SOLUTION INTRAMUSCULAR; INTRAVENOUS EVERY 6 HOURS
Status: COMPLETED | OUTPATIENT
Start: 2024-06-20 | End: 2024-06-21

## 2024-06-19 RX ORDER — DEXAMETHASONE SODIUM PHOSPHATE 10 MG/ML
10 INJECTION, SOLUTION INTRAMUSCULAR; INTRAVENOUS ONCE
Status: COMPLETED | OUTPATIENT
Start: 2024-06-19 | End: 2024-06-19

## 2024-06-19 RX ORDER — KETOROLAC TROMETHAMINE 30 MG/ML
30 INJECTION, SOLUTION INTRAMUSCULAR; INTRAVENOUS ONCE
Status: COMPLETED | OUTPATIENT
Start: 2024-06-19 | End: 2024-06-19

## 2024-06-19 RX ORDER — PANTOPRAZOLE SODIUM 40 MG/1
40 TABLET, DELAYED RELEASE ORAL
Status: DISCONTINUED | OUTPATIENT
Start: 2024-06-20 | End: 2024-07-03 | Stop reason: HOSPADM

## 2024-06-19 RX ORDER — DEXAMETHASONE SODIUM PHOSPHATE 4 MG/ML
4 INJECTION, SOLUTION INTRA-ARTICULAR; INTRALESIONAL; INTRAMUSCULAR; INTRAVENOUS; SOFT TISSUE EVERY 8 HOURS
Status: DISCONTINUED | OUTPATIENT
Start: 2024-06-20 | End: 2024-06-21

## 2024-06-19 RX ORDER — SODIUM CHLORIDE 9 MG/ML
INJECTION, SOLUTION INTRAVENOUS CONTINUOUS
Status: DISCONTINUED | OUTPATIENT
Start: 2024-06-19 | End: 2024-06-22

## 2024-06-19 RX ADMIN — OXYCODONE HYDROCHLORIDE 5 MG: 5 TABLET ORAL at 01:27

## 2024-06-19 RX ADMIN — NIFEDIPINE 30 MG: 30 TABLET, EXTENDED RELEASE ORAL at 19:44

## 2024-06-19 RX ADMIN — HYDRALAZINE HYDROCHLORIDE 100 MG: 50 TABLET ORAL at 20:03

## 2024-06-19 RX ADMIN — HYDRALAZINE HYDROCHLORIDE 100 MG: 50 TABLET ORAL at 14:17

## 2024-06-19 RX ADMIN — ASPIRIN 81 MG: 81 TABLET, COATED ORAL at 08:29

## 2024-06-19 RX ADMIN — SODIUM CHLORIDE, PRESERVATIVE FREE 10 ML: 5 INJECTION INTRAVENOUS at 19:45

## 2024-06-19 RX ADMIN — DICLOFENAC SODIUM 2 G: 10 GEL TOPICAL at 08:32

## 2024-06-19 RX ADMIN — ENOXAPARIN SODIUM 40 MG: 100 INJECTION SUBCUTANEOUS at 08:29

## 2024-06-19 RX ADMIN — ATORVASTATIN CALCIUM 40 MG: 40 TABLET, FILM COATED ORAL at 19:44

## 2024-06-19 RX ADMIN — OXYCODONE HYDROCHLORIDE 5 MG: 5 TABLET ORAL at 23:05

## 2024-06-19 RX ADMIN — ROPINIROLE HYDROCHLORIDE 2 MG: 1 TABLET, FILM COATED ORAL at 19:41

## 2024-06-19 RX ADMIN — OXYCODONE HYDROCHLORIDE 5 MG: 5 TABLET ORAL at 08:30

## 2024-06-19 RX ADMIN — ONDANSETRON 4 MG: 4 TABLET, ORALLY DISINTEGRATING ORAL at 11:18

## 2024-06-19 RX ADMIN — ACETAMINOPHEN 650 MG: 325 TABLET ORAL at 05:58

## 2024-06-19 RX ADMIN — SODIUM CHLORIDE: 9 INJECTION, SOLUTION INTRAVENOUS at 19:24

## 2024-06-19 RX ADMIN — CEFTRIAXONE SODIUM 2000 MG: 2 INJECTION, POWDER, FOR SOLUTION INTRAMUSCULAR; INTRAVENOUS at 05:53

## 2024-06-19 RX ADMIN — METOPROLOL 100 MG: 100 TABLET ORAL at 19:40

## 2024-06-19 RX ADMIN — ANTI-FUNGAL POWDER MICONAZOLE NITRATE TALC FREE: 1.42 POWDER TOPICAL at 19:40

## 2024-06-19 RX ADMIN — INSULIN LISPRO 4 UNITS: 100 INJECTION, SOLUTION INTRAVENOUS; SUBCUTANEOUS at 11:17

## 2024-06-19 RX ADMIN — BUSPIRONE HYDROCHLORIDE 10 MG: 5 TABLET ORAL at 19:44

## 2024-06-19 RX ADMIN — DEXAMETHASONE SODIUM PHOSPHATE 10 MG: 10 INJECTION, SOLUTION INTRAMUSCULAR; INTRAVENOUS at 20:03

## 2024-06-19 RX ADMIN — ANTI-FUNGAL POWDER MICONAZOLE NITRATE TALC FREE: 1.42 POWDER TOPICAL at 08:31

## 2024-06-19 RX ADMIN — KETOROLAC TROMETHAMINE 30 MG: 30 INJECTION INTRAMUSCULAR; INTRAVENOUS at 19:42

## 2024-06-19 RX ADMIN — METOPROLOL 100 MG: 100 TABLET ORAL at 08:29

## 2024-06-19 RX ADMIN — BUSPIRONE HYDROCHLORIDE 10 MG: 5 TABLET ORAL at 08:29

## 2024-06-19 RX ADMIN — LATANOPROST 1 DROP: 50 SOLUTION OPHTHALMIC at 19:43

## 2024-06-19 RX ADMIN — BUSPIRONE HYDROCHLORIDE 10 MG: 5 TABLET ORAL at 14:17

## 2024-06-19 RX ADMIN — VENLAFAXINE HYDROCHLORIDE 75 MG: 75 CAPSULE, EXTENDED RELEASE ORAL at 08:29

## 2024-06-19 RX ADMIN — HYDRALAZINE HYDROCHLORIDE 100 MG: 50 TABLET ORAL at 05:53

## 2024-06-19 RX ADMIN — SODIUM CHLORIDE, PRESERVATIVE FREE 10 ML: 5 INJECTION INTRAVENOUS at 08:32

## 2024-06-19 RX ADMIN — Medication 400 MG: at 08:30

## 2024-06-19 RX ADMIN — LISINOPRIL 40 MG: 20 TABLET ORAL at 08:29

## 2024-06-19 RX ADMIN — INSULIN GLARGINE 22 UNITS: 100 INJECTION, SOLUTION SUBCUTANEOUS at 08:28

## 2024-06-19 RX ADMIN — Medication 1 CAPSULE: at 08:29

## 2024-06-19 RX ADMIN — ONDANSETRON 4 MG: 4 TABLET, ORALLY DISINTEGRATING ORAL at 01:27

## 2024-06-19 RX ADMIN — SPIRONOLACTONE 25 MG: 25 TABLET ORAL at 08:32

## 2024-06-19 RX ADMIN — OXYCODONE HYDROCHLORIDE 5 MG: 5 TABLET ORAL at 14:34

## 2024-06-19 ASSESSMENT — PAIN - FUNCTIONAL ASSESSMENT
PAIN_FUNCTIONAL_ASSESSMENT: PREVENTS OR INTERFERES WITH MANY ACTIVE NOT PASSIVE ACTIVITIES
PAIN_FUNCTIONAL_ASSESSMENT: ACTIVITIES ARE NOT PREVENTED
PAIN_FUNCTIONAL_ASSESSMENT: ACTIVITIES ARE NOT PREVENTED
PAIN_FUNCTIONAL_ASSESSMENT: PREVENTS OR INTERFERES SOME ACTIVE ACTIVITIES AND ADLS
PAIN_FUNCTIONAL_ASSESSMENT: ACTIVITIES ARE NOT PREVENTED
PAIN_FUNCTIONAL_ASSESSMENT: PREVENTS OR INTERFERES SOME ACTIVE ACTIVITIES AND ADLS
PAIN_FUNCTIONAL_ASSESSMENT: ACTIVITIES ARE NOT PREVENTED

## 2024-06-19 ASSESSMENT — PAIN DESCRIPTION - LOCATION
LOCATION: HEAD
LOCATION: NECK;HEAD
LOCATION: HEAD
LOCATION: HEAD;NECK
LOCATION: HEAD
LOCATION: NECK;HEAD
LOCATION: HEAD
LOCATION: HEAD
LOCATION: NECK;HEAD

## 2024-06-19 ASSESSMENT — PAIN SCALES - WONG BAKER
WONGBAKER_NUMERICALRESPONSE: HURTS WHOLE LOT

## 2024-06-19 ASSESSMENT — PAIN DESCRIPTION - DESCRIPTORS
DESCRIPTORS: SHARP
DESCRIPTORS: ACHING
DESCRIPTORS: SHARP
DESCRIPTORS: SHARP
DESCRIPTORS: ACHING
DESCRIPTORS: ACHING
DESCRIPTORS: THROBBING
DESCRIPTORS: ACHING

## 2024-06-19 ASSESSMENT — PAIN SCALES - GENERAL
PAINLEVEL_OUTOF10: 10
PAINLEVEL_OUTOF10: 10
PAINLEVEL_OUTOF10: 5
PAINLEVEL_OUTOF10: 5
PAINLEVEL_OUTOF10: 9
PAINLEVEL_OUTOF10: 6
PAINLEVEL_OUTOF10: 8
PAINLEVEL_OUTOF10: 8
PAINLEVEL_OUTOF10: 9
PAINLEVEL_OUTOF10: 8
PAINLEVEL_OUTOF10: 5
PAINLEVEL_OUTOF10: 5
PAINLEVEL_OUTOF10: 9
PAINLEVEL_OUTOF10: 9
PAINLEVEL_OUTOF10: 7
PAINLEVEL_OUTOF10: 4

## 2024-06-19 ASSESSMENT — PAIN DESCRIPTION - ORIENTATION
ORIENTATION: MID;LOWER;POSTERIOR
ORIENTATION: ANTERIOR
ORIENTATION: MID;POSTERIOR
ORIENTATION: MID;POSTERIOR

## 2024-06-19 NOTE — CARE COORDINATION
ARU CASE MANAGEMENT NOTE:    Admission Date:  6/14/2024 Kavya De Santiago is a 64 y.o.  female    Admitted for : Meningitis [G03.9]  Encephalopathy [G93.40]    Spoke with patient regarding pending medicaid application. She stated that her  was able to turn in all of the needed documentation yesterday. Will follow up with the HELP department in a couple of days to check status. Patient aware that it may not come through before discharge and that she may need to go to a SNF for a short while.     Will continue to follow for additional discharge needs.    Electronically signed by Terrie Johnson RN on 6/19/2024 at 2:56 PM

## 2024-06-20 PROCEDURE — 6370000000 HC RX 637 (ALT 250 FOR IP): Performed by: INTERNAL MEDICINE

## 2024-06-20 PROCEDURE — 2580000003 HC RX 258: Performed by: PSYCHIATRY & NEUROLOGY

## 2024-06-20 PROCEDURE — 99233 SBSQ HOSP IP/OBS HIGH 50: CPT | Performed by: PSYCHIATRY & NEUROLOGY

## 2024-06-20 PROCEDURE — 1180000000 HC REHAB R&B

## 2024-06-20 PROCEDURE — 99232 SBSQ HOSP IP/OBS MODERATE 35: CPT | Performed by: STUDENT IN AN ORGANIZED HEALTH CARE EDUCATION/TRAINING PROGRAM

## 2024-06-20 PROCEDURE — 97110 THERAPEUTIC EXERCISES: CPT

## 2024-06-20 PROCEDURE — 97130 THER IVNTJ EA ADDL 15 MIN: CPT

## 2024-06-20 PROCEDURE — 99232 SBSQ HOSP IP/OBS MODERATE 35: CPT | Performed by: INTERNAL MEDICINE

## 2024-06-20 PROCEDURE — 97129 THER IVNTJ 1ST 15 MIN: CPT

## 2024-06-20 PROCEDURE — 6370000000 HC RX 637 (ALT 250 FOR IP): Performed by: PHYSICAL MEDICINE & REHABILITATION

## 2024-06-20 PROCEDURE — 2580000003 HC RX 258: Performed by: NURSE PRACTITIONER

## 2024-06-20 PROCEDURE — 6360000002 HC RX W HCPCS: Performed by: PSYCHIATRY & NEUROLOGY

## 2024-06-20 PROCEDURE — 2500000003 HC RX 250 WO HCPCS: Performed by: PSYCHIATRY & NEUROLOGY

## 2024-06-20 PROCEDURE — 6360000002 HC RX W HCPCS: Performed by: PHYSICAL MEDICINE & REHABILITATION

## 2024-06-20 PROCEDURE — 97530 THERAPEUTIC ACTIVITIES: CPT

## 2024-06-20 PROCEDURE — 6360000002 HC RX W HCPCS: Performed by: STUDENT IN AN ORGANIZED HEALTH CARE EDUCATION/TRAINING PROGRAM

## 2024-06-20 PROCEDURE — 97535 SELF CARE MNGMENT TRAINING: CPT

## 2024-06-20 PROCEDURE — 6370000000 HC RX 637 (ALT 250 FOR IP): Performed by: PSYCHIATRY & NEUROLOGY

## 2024-06-20 PROCEDURE — 2580000003 HC RX 258: Performed by: STUDENT IN AN ORGANIZED HEALTH CARE EDUCATION/TRAINING PROGRAM

## 2024-06-20 PROCEDURE — 97116 GAIT TRAINING THERAPY: CPT

## 2024-06-20 RX ORDER — LANOLIN ALCOHOL/MO/W.PET/CERES
400 CREAM (GRAM) TOPICAL 2 TIMES DAILY
Status: DISCONTINUED | OUTPATIENT
Start: 2024-06-20 | End: 2024-06-30

## 2024-06-20 RX ORDER — INSULIN LISPRO 100 [IU]/ML
0-4 INJECTION, SOLUTION INTRAVENOUS; SUBCUTANEOUS NIGHTLY
Status: DISCONTINUED | OUTPATIENT
Start: 2024-06-20 | End: 2024-07-03 | Stop reason: HOSPADM

## 2024-06-20 RX ORDER — INSULIN LISPRO 100 [IU]/ML
0-8 INJECTION, SOLUTION INTRAVENOUS; SUBCUTANEOUS
Status: DISCONTINUED | OUTPATIENT
Start: 2024-06-20 | End: 2024-07-03 | Stop reason: HOSPADM

## 2024-06-20 RX ORDER — LISINOPRIL 20 MG/1
20 TABLET ORAL DAILY
Status: DISCONTINUED | OUTPATIENT
Start: 2024-06-21 | End: 2024-07-03 | Stop reason: HOSPADM

## 2024-06-20 RX ORDER — NIFEDIPINE 30 MG
30 TABLET, EXTENDED RELEASE ORAL NIGHTLY
Status: DISCONTINUED | OUTPATIENT
Start: 2024-06-20 | End: 2024-07-03 | Stop reason: HOSPADM

## 2024-06-20 RX ORDER — HYDRALAZINE HYDROCHLORIDE 50 MG/1
50 TABLET, FILM COATED ORAL EVERY 8 HOURS SCHEDULED
Status: DISCONTINUED | OUTPATIENT
Start: 2024-06-20 | End: 2024-07-03 | Stop reason: HOSPADM

## 2024-06-20 RX ADMIN — DEXAMETHASONE SODIUM PHOSPHATE 4 MG: 4 INJECTION INTRA-ARTICULAR; INTRALESIONAL; INTRAMUSCULAR; INTRAVENOUS; SOFT TISSUE at 02:12

## 2024-06-20 RX ADMIN — CEFTRIAXONE SODIUM 2000 MG: 2 INJECTION, POWDER, FOR SOLUTION INTRAMUSCULAR; INTRAVENOUS at 05:39

## 2024-06-20 RX ADMIN — SODIUM CHLORIDE, PRESERVATIVE FREE 10 ML: 5 INJECTION INTRAVENOUS at 21:18

## 2024-06-20 RX ADMIN — DEXAMETHASONE SODIUM PHOSPHATE 4 MG: 4 INJECTION INTRA-ARTICULAR; INTRALESIONAL; INTRAMUSCULAR; INTRAVENOUS; SOFT TISSUE at 18:39

## 2024-06-20 RX ADMIN — ACETAMINOPHEN 650 MG: 325 TABLET ORAL at 13:02

## 2024-06-20 RX ADMIN — INSULIN GLARGINE 22 UNITS: 100 INJECTION, SOLUTION SUBCUTANEOUS at 08:02

## 2024-06-20 RX ADMIN — NIFEDIPINE 30 MG: 30 TABLET, EXTENDED RELEASE ORAL at 21:14

## 2024-06-20 RX ADMIN — SODIUM CHLORIDE: 9 INJECTION, SOLUTION INTRAVENOUS at 01:54

## 2024-06-20 RX ADMIN — VENLAFAXINE HYDROCHLORIDE 75 MG: 75 CAPSULE, EXTENDED RELEASE ORAL at 08:01

## 2024-06-20 RX ADMIN — DEXAMETHASONE SODIUM PHOSPHATE 4 MG: 4 INJECTION INTRA-ARTICULAR; INTRALESIONAL; INTRAMUSCULAR; INTRAVENOUS; SOFT TISSUE at 08:08

## 2024-06-20 RX ADMIN — ATORVASTATIN CALCIUM 40 MG: 40 TABLET, FILM COATED ORAL at 21:05

## 2024-06-20 RX ADMIN — SPIRONOLACTONE 25 MG: 25 TABLET ORAL at 08:01

## 2024-06-20 RX ADMIN — METOPROLOL 100 MG: 100 TABLET ORAL at 21:05

## 2024-06-20 RX ADMIN — BUSPIRONE HYDROCHLORIDE 10 MG: 5 TABLET ORAL at 15:46

## 2024-06-20 RX ADMIN — POLYETHYLENE GLYCOL 3350 17 G: 17 POWDER, FOR SOLUTION ORAL at 08:02

## 2024-06-20 RX ADMIN — Medication 400 MG: at 21:05

## 2024-06-20 RX ADMIN — METOPROLOL 100 MG: 100 TABLET ORAL at 08:01

## 2024-06-20 RX ADMIN — Medication 400 MG: at 08:01

## 2024-06-20 RX ADMIN — Medication 1 CAPSULE: at 08:01

## 2024-06-20 RX ADMIN — INSULIN LISPRO 4 UNITS: 100 INJECTION, SOLUTION INTRAVENOUS; SUBCUTANEOUS at 06:54

## 2024-06-20 RX ADMIN — KETOROLAC TROMETHAMINE 15 MG: 30 INJECTION INTRAMUSCULAR; INTRAVENOUS at 15:47

## 2024-06-20 RX ADMIN — VALPROATE SODIUM 500 MG: 100 INJECTION, SOLUTION INTRAVENOUS at 21:21

## 2024-06-20 RX ADMIN — PANTOPRAZOLE SODIUM 40 MG: 40 TABLET, DELAYED RELEASE ORAL at 05:41

## 2024-06-20 RX ADMIN — HYDRALAZINE HYDROCHLORIDE 50 MG: 50 TABLET ORAL at 21:05

## 2024-06-20 RX ADMIN — LATANOPROST 1 DROP: 50 SOLUTION OPHTHALMIC at 21:15

## 2024-06-20 RX ADMIN — ANTI-FUNGAL POWDER MICONAZOLE NITRATE TALC FREE: 1.42 POWDER TOPICAL at 21:14

## 2024-06-20 RX ADMIN — ASPIRIN 81 MG: 81 TABLET, COATED ORAL at 08:01

## 2024-06-20 RX ADMIN — LISINOPRIL 40 MG: 20 TABLET ORAL at 08:01

## 2024-06-20 RX ADMIN — INSULIN LISPRO 4 UNITS: 100 INJECTION, SOLUTION INTRAVENOUS; SUBCUTANEOUS at 13:03

## 2024-06-20 RX ADMIN — KETOROLAC TROMETHAMINE 15 MG: 30 INJECTION INTRAMUSCULAR; INTRAVENOUS at 02:11

## 2024-06-20 RX ADMIN — ROPINIROLE HYDROCHLORIDE 2 MG: 1 TABLET, FILM COATED ORAL at 21:05

## 2024-06-20 RX ADMIN — HYDRALAZINE HYDROCHLORIDE 100 MG: 50 TABLET ORAL at 05:41

## 2024-06-20 RX ADMIN — BUSPIRONE HYDROCHLORIDE 10 MG: 5 TABLET ORAL at 21:05

## 2024-06-20 RX ADMIN — SODIUM CHLORIDE: 9 INJECTION, SOLUTION INTRAVENOUS at 22:51

## 2024-06-20 RX ADMIN — ENOXAPARIN SODIUM 40 MG: 100 INJECTION SUBCUTANEOUS at 08:02

## 2024-06-20 RX ADMIN — BUSPIRONE HYDROCHLORIDE 10 MG: 5 TABLET ORAL at 08:01

## 2024-06-20 RX ADMIN — KETOROLAC TROMETHAMINE 15 MG: 30 INJECTION INTRAMUSCULAR; INTRAVENOUS at 21:04

## 2024-06-20 RX ADMIN — ACETAMINOPHEN 650 MG: 325 TABLET ORAL at 18:39

## 2024-06-20 RX ADMIN — KETOROLAC TROMETHAMINE 15 MG: 30 INJECTION INTRAMUSCULAR; INTRAVENOUS at 08:02

## 2024-06-20 ASSESSMENT — PAIN SCALES - GENERAL
PAINLEVEL_OUTOF10: 4
PAINLEVEL_OUTOF10: 6
PAINLEVEL_OUTOF10: 6
PAINLEVEL_OUTOF10: 4
PAINLEVEL_OUTOF10: 6
PAINLEVEL_OUTOF10: 4
PAINLEVEL_OUTOF10: 6
PAINLEVEL_OUTOF10: 6
PAINLEVEL_OUTOF10: 4

## 2024-06-20 ASSESSMENT — PAIN DESCRIPTION - ONSET
ONSET: ON-GOING

## 2024-06-20 ASSESSMENT — PAIN DESCRIPTION - PAIN TYPE
TYPE: ACUTE PAIN

## 2024-06-20 ASSESSMENT — PAIN - FUNCTIONAL ASSESSMENT

## 2024-06-20 ASSESSMENT — PAIN DESCRIPTION - ORIENTATION
ORIENTATION: ANTERIOR
ORIENTATION: MID
ORIENTATION: POSTERIOR
ORIENTATION: ANTERIOR;POSTERIOR
ORIENTATION: ANTERIOR
ORIENTATION: ANTERIOR;POSTERIOR

## 2024-06-20 ASSESSMENT — PAIN DESCRIPTION - DESCRIPTORS
DESCRIPTORS: THROBBING
DESCRIPTORS: ACHING
DESCRIPTORS: ACHING
DESCRIPTORS: SHARP
DESCRIPTORS: ACHING
DESCRIPTORS: THROBBING
DESCRIPTORS: SHARP

## 2024-06-20 ASSESSMENT — PAIN SCALES - WONG BAKER
WONGBAKER_NUMERICALRESPONSE: HURTS A LITTLE BIT

## 2024-06-20 ASSESSMENT — PAIN DESCRIPTION - LOCATION
LOCATION: HEAD
LOCATION: HEAD;NECK
LOCATION: HEAD
LOCATION: HEAD;NECK
LOCATION: HEAD
LOCATION: HEAD;NECK
LOCATION: HEAD

## 2024-06-20 ASSESSMENT — PAIN DESCRIPTION - FREQUENCY
FREQUENCY: INTERMITTENT

## 2024-06-20 NOTE — CARE COORDINATION
ARU CASE MANAGEMENT NOTE:    Admission Date:  6/14/2024 Kavya De Santiago is a 64 y.o.  female    Admitted for : Meningitis [G03.9]  Encephalopathy [G93.40]    Spoke with Indigo with the HELP (public assistance) department regarding this patient's pending Medicaid application. Advised her that the patient's  did turn all documentation in earlier this week. She will monitor patient's account to see if it gets approved prior to her expected discharge of 6/25/24.    Discussed with patient - updated her that HELP would continue to monitor for coverage. Final discharge plans remain up in the air - if pending medicaid goes through and patient improves on stairs, patient may dc home with HHC and infusion. IF those circumstances are not met, patient will need to go to a SNF. RN CM provided the patient with a list of SNFs that will consider pending Medicaid patients on a case-by-case basis. She will review with her  and give the RN CM a choice tomorrow.       Outside appointments while in ARU: none    DME:tbd    Will continue to follow for additional discharge needs.    Electronically signed by Terrie Johnson RN on 6/20/2024 at 8:53 AM

## 2024-06-21 ENCOUNTER — TELEPHONE (OUTPATIENT)
Dept: FAMILY MEDICINE CLINIC | Age: 64
End: 2024-06-21

## 2024-06-21 ENCOUNTER — APPOINTMENT (OUTPATIENT)
Dept: MRI IMAGING | Age: 64
DRG: 071 | End: 2024-06-21
Attending: PHYSICAL MEDICINE & REHABILITATION
Payer: COMMERCIAL

## 2024-06-21 LAB
GLUCOSE BLD-MCNC: 213 MG/DL (ref 65–105)
GLUCOSE BLD-MCNC: 252 MG/DL (ref 65–105)

## 2024-06-21 PROCEDURE — 2580000003 HC RX 258: Performed by: STUDENT IN AN ORGANIZED HEALTH CARE EDUCATION/TRAINING PROGRAM

## 2024-06-21 PROCEDURE — 97116 GAIT TRAINING THERAPY: CPT

## 2024-06-21 PROCEDURE — 6370000000 HC RX 637 (ALT 250 FOR IP): Performed by: INTERNAL MEDICINE

## 2024-06-21 PROCEDURE — 97535 SELF CARE MNGMENT TRAINING: CPT

## 2024-06-21 PROCEDURE — 6370000000 HC RX 637 (ALT 250 FOR IP): Performed by: PHYSICAL MEDICINE & REHABILITATION

## 2024-06-21 PROCEDURE — 6370000000 HC RX 637 (ALT 250 FOR IP): Performed by: PSYCHIATRY & NEUROLOGY

## 2024-06-21 PROCEDURE — 82947 ASSAY GLUCOSE BLOOD QUANT: CPT

## 2024-06-21 PROCEDURE — 6360000002 HC RX W HCPCS: Performed by: PSYCHIATRY & NEUROLOGY

## 2024-06-21 PROCEDURE — 99232 SBSQ HOSP IP/OBS MODERATE 35: CPT | Performed by: INTERNAL MEDICINE

## 2024-06-21 PROCEDURE — 2580000003 HC RX 258: Performed by: NURSE PRACTITIONER

## 2024-06-21 PROCEDURE — 99232 SBSQ HOSP IP/OBS MODERATE 35: CPT | Performed by: PSYCHIATRY & NEUROLOGY

## 2024-06-21 PROCEDURE — 97110 THERAPEUTIC EXERCISES: CPT

## 2024-06-21 PROCEDURE — 97530 THERAPEUTIC ACTIVITIES: CPT

## 2024-06-21 PROCEDURE — 1180000000 HC REHAB R&B

## 2024-06-21 PROCEDURE — 6360000002 HC RX W HCPCS: Performed by: PHYSICAL MEDICINE & REHABILITATION

## 2024-06-21 PROCEDURE — 6360000004 HC RX CONTRAST MEDICATION: Performed by: PSYCHIATRY & NEUROLOGY

## 2024-06-21 PROCEDURE — 6360000002 HC RX W HCPCS: Performed by: STUDENT IN AN ORGANIZED HEALTH CARE EDUCATION/TRAINING PROGRAM

## 2024-06-21 PROCEDURE — A9579 GAD-BASE MR CONTRAST NOS,1ML: HCPCS | Performed by: PSYCHIATRY & NEUROLOGY

## 2024-06-21 PROCEDURE — 99232 SBSQ HOSP IP/OBS MODERATE 35: CPT | Performed by: STUDENT IN AN ORGANIZED HEALTH CARE EDUCATION/TRAINING PROGRAM

## 2024-06-21 PROCEDURE — 6370000000 HC RX 637 (ALT 250 FOR IP): Performed by: STUDENT IN AN ORGANIZED HEALTH CARE EDUCATION/TRAINING PROGRAM

## 2024-06-21 PROCEDURE — 70546 MR ANGIOGRAPH HEAD W/O&W/DYE: CPT

## 2024-06-21 PROCEDURE — 70553 MRI BRAIN STEM W/O & W/DYE: CPT

## 2024-06-21 PROCEDURE — 97130 THER IVNTJ EA ADDL 15 MIN: CPT

## 2024-06-21 PROCEDURE — 2580000003 HC RX 258: Performed by: PSYCHIATRY & NEUROLOGY

## 2024-06-21 PROCEDURE — 97129 THER IVNTJ 1ST 15 MIN: CPT

## 2024-06-21 RX ORDER — DIVALPROEX SODIUM 250 MG/1
250 TABLET, DELAYED RELEASE ORAL 2 TIMES DAILY
Status: DISCONTINUED | OUTPATIENT
Start: 2024-06-21 | End: 2024-07-03 | Stop reason: HOSPADM

## 2024-06-21 RX ORDER — SODIUM CHLORIDE 0.9 % (FLUSH) 0.9 %
10 SYRINGE (ML) INJECTION PRN
Status: DISCONTINUED | OUTPATIENT
Start: 2024-06-21 | End: 2024-07-03 | Stop reason: HOSPADM

## 2024-06-21 RX ORDER — DEXTROSE MONOHYDRATE 100 MG/ML
INJECTION, SOLUTION INTRAVENOUS CONTINUOUS PRN
Status: DISCONTINUED | OUTPATIENT
Start: 2024-06-21 | End: 2024-07-03 | Stop reason: HOSPADM

## 2024-06-21 RX ORDER — BUSPIRONE HYDROCHLORIDE 5 MG/1
10 TABLET ORAL 3 TIMES DAILY
Status: DISCONTINUED | OUTPATIENT
Start: 2024-06-21 | End: 2024-07-03 | Stop reason: HOSPADM

## 2024-06-21 RX ORDER — DEXAMETHASONE SODIUM PHOSPHATE 4 MG/ML
4 INJECTION, SOLUTION INTRA-ARTICULAR; INTRALESIONAL; INTRAMUSCULAR; INTRAVENOUS; SOFT TISSUE EVERY 12 HOURS
Status: DISCONTINUED | OUTPATIENT
Start: 2024-06-22 | End: 2024-06-23

## 2024-06-21 RX ORDER — DIAZEPAM 2 MG/1
2 TABLET ORAL
Status: COMPLETED | OUTPATIENT
Start: 2024-06-21 | End: 2024-06-21

## 2024-06-21 RX ORDER — BUTALBITAL, ACETAMINOPHEN AND CAFFEINE 300; 40; 50 MG/1; MG/1; MG/1
1 CAPSULE ORAL EVERY 4 HOURS PRN
Status: DISCONTINUED | OUTPATIENT
Start: 2024-06-21 | End: 2024-07-03 | Stop reason: HOSPADM

## 2024-06-21 RX ADMIN — NIFEDIPINE 30 MG: 30 TABLET, EXTENDED RELEASE ORAL at 19:53

## 2024-06-21 RX ADMIN — KETOROLAC TROMETHAMINE 15 MG: 30 INJECTION INTRAMUSCULAR; INTRAVENOUS at 01:36

## 2024-06-21 RX ADMIN — KETOROLAC TROMETHAMINE 15 MG: 30 INJECTION INTRAMUSCULAR; INTRAVENOUS at 08:20

## 2024-06-21 RX ADMIN — BUSPIRONE HYDROCHLORIDE 10 MG: 5 TABLET ORAL at 13:15

## 2024-06-21 RX ADMIN — ASPIRIN 81 MG: 81 TABLET, COATED ORAL at 08:24

## 2024-06-21 RX ADMIN — ANTI-FUNGAL POWDER MICONAZOLE NITRATE TALC FREE: 1.42 POWDER TOPICAL at 19:52

## 2024-06-21 RX ADMIN — METOPROLOL 100 MG: 100 TABLET ORAL at 08:24

## 2024-06-21 RX ADMIN — SODIUM CHLORIDE, PRESERVATIVE FREE 10 ML: 5 INJECTION INTRAVENOUS at 08:26

## 2024-06-21 RX ADMIN — SPIRONOLACTONE 25 MG: 25 TABLET ORAL at 08:24

## 2024-06-21 RX ADMIN — SODIUM CHLORIDE, PRESERVATIVE FREE 10 ML: 5 INJECTION INTRAVENOUS at 19:53

## 2024-06-21 RX ADMIN — Medication 400 MG: at 19:52

## 2024-06-21 RX ADMIN — DEXAMETHASONE SODIUM PHOSPHATE 4 MG: 4 INJECTION INTRA-ARTICULAR; INTRALESIONAL; INTRAMUSCULAR; INTRAVENOUS; SOFT TISSUE at 18:20

## 2024-06-21 RX ADMIN — INSULIN LISPRO 4 UNITS: 100 INJECTION, SOLUTION INTRAVENOUS; SUBCUTANEOUS at 17:06

## 2024-06-21 RX ADMIN — VENLAFAXINE HYDROCHLORIDE 75 MG: 75 CAPSULE, EXTENDED RELEASE ORAL at 08:24

## 2024-06-21 RX ADMIN — INSULIN GLARGINE 22 UNITS: 100 INJECTION, SOLUTION SUBCUTANEOUS at 08:21

## 2024-06-21 RX ADMIN — SODIUM CHLORIDE, PRESERVATIVE FREE 10 ML: 5 INJECTION INTRAVENOUS at 16:30

## 2024-06-21 RX ADMIN — HYDRALAZINE HYDROCHLORIDE 50 MG: 50 TABLET ORAL at 13:20

## 2024-06-21 RX ADMIN — GADOTERIDOL 20 ML: 279.3 INJECTION, SOLUTION INTRAVENOUS at 16:30

## 2024-06-21 RX ADMIN — BUSPIRONE HYDROCHLORIDE 10 MG: 5 TABLET ORAL at 08:24

## 2024-06-21 RX ADMIN — INSULIN LISPRO 2 UNITS: 100 INJECTION, SOLUTION INTRAVENOUS; SUBCUTANEOUS at 12:06

## 2024-06-21 RX ADMIN — PANTOPRAZOLE SODIUM 40 MG: 40 TABLET, DELAYED RELEASE ORAL at 06:13

## 2024-06-21 RX ADMIN — HYDRALAZINE HYDROCHLORIDE 50 MG: 50 TABLET ORAL at 06:13

## 2024-06-21 RX ADMIN — LISINOPRIL 20 MG: 20 TABLET ORAL at 08:25

## 2024-06-21 RX ADMIN — LATANOPROST 1 DROP: 50 SOLUTION OPHTHALMIC at 19:54

## 2024-06-21 RX ADMIN — Medication 400 MG: at 08:25

## 2024-06-21 RX ADMIN — ROPINIROLE HYDROCHLORIDE 2 MG: 1 TABLET, FILM COATED ORAL at 20:31

## 2024-06-21 RX ADMIN — ENOXAPARIN SODIUM 40 MG: 100 INJECTION SUBCUTANEOUS at 08:20

## 2024-06-21 RX ADMIN — CEFTRIAXONE SODIUM 2000 MG: 2 INJECTION, POWDER, FOR SOLUTION INTRAMUSCULAR; INTRAVENOUS at 06:25

## 2024-06-21 RX ADMIN — BUSPIRONE HYDROCHLORIDE 10 MG: 5 TABLET ORAL at 19:52

## 2024-06-21 RX ADMIN — INSULIN LISPRO 4 UNITS: 100 INJECTION, SOLUTION INTRAVENOUS; SUBCUTANEOUS at 19:54

## 2024-06-21 RX ADMIN — ANTI-FUNGAL POWDER MICONAZOLE NITRATE TALC FREE: 1.42 POWDER TOPICAL at 08:19

## 2024-06-21 RX ADMIN — DIAZEPAM 2 MG: 2 TABLET ORAL at 14:52

## 2024-06-21 RX ADMIN — DEXAMETHASONE SODIUM PHOSPHATE 4 MG: 4 INJECTION INTRA-ARTICULAR; INTRALESIONAL; INTRAMUSCULAR; INTRAVENOUS; SOFT TISSUE at 01:37

## 2024-06-21 RX ADMIN — DEXAMETHASONE SODIUM PHOSPHATE 4 MG: 4 INJECTION INTRA-ARTICULAR; INTRALESIONAL; INTRAMUSCULAR; INTRAVENOUS; SOFT TISSUE at 10:35

## 2024-06-21 RX ADMIN — HYDRALAZINE HYDROCHLORIDE 50 MG: 50 TABLET ORAL at 21:36

## 2024-06-21 RX ADMIN — METOPROLOL 100 MG: 100 TABLET ORAL at 19:52

## 2024-06-21 RX ADMIN — ONDANSETRON 4 MG: 4 TABLET, ORALLY DISINTEGRATING ORAL at 01:36

## 2024-06-21 RX ADMIN — ATORVASTATIN CALCIUM 40 MG: 40 TABLET, FILM COATED ORAL at 19:52

## 2024-06-21 RX ADMIN — Medication 1 CAPSULE: at 08:25

## 2024-06-21 RX ADMIN — INSULIN LISPRO 4 UNITS: 100 INJECTION, SOLUTION INTRAVENOUS; SUBCUTANEOUS at 08:26

## 2024-06-21 RX ADMIN — DIVALPROEX SODIUM 250 MG: 250 TABLET, DELAYED RELEASE ORAL at 21:36

## 2024-06-21 ASSESSMENT — PAIN SCALES - GENERAL
PAINLEVEL_OUTOF10: 4
PAINLEVEL_OUTOF10: 5

## 2024-06-21 ASSESSMENT — PAIN DESCRIPTION - ONSET: ONSET: ON-GOING

## 2024-06-21 ASSESSMENT — PAIN DESCRIPTION - DESCRIPTORS
DESCRIPTORS: ACHING
DESCRIPTORS: ACHING

## 2024-06-21 ASSESSMENT — PAIN DESCRIPTION - LOCATION
LOCATION: HEAD
LOCATION: HEAD

## 2024-06-21 ASSESSMENT — PAIN DESCRIPTION - PAIN TYPE: TYPE: ACUTE PAIN

## 2024-06-21 ASSESSMENT — PAIN DESCRIPTION - FREQUENCY: FREQUENCY: INTERMITTENT

## 2024-06-21 ASSESSMENT — PAIN DESCRIPTION - ORIENTATION: ORIENTATION: ANTERIOR

## 2024-06-21 ASSESSMENT — PAIN SCALES - WONG BAKER: WONGBAKER_NUMERICALRESPONSE: HURTS A LITTLE BIT

## 2024-06-21 NOTE — TELEPHONE ENCOUNTER
Medication Management Service ([facility])  [facility]  [phone]     CLINICAL PHARMACY NOTE:    Message left by patient's , Rj.  Rj requesting a Clifford 3 sensor sample for his wife as she needed to remove current sensor due to procedure.  Insurance-pending medicaid review.    Clifford 3 sensor set aside for patient's  to  from the office.  (Lot # [id number] Exp. 10-)    Phone call back to patient's , Rj.  Spoke with patient.  Shared above information with patient.      Patricia March, Pharm.D., [facility]  Clinical Pharmacist  [facility] Medication Management Service  [phone]  6/21/2024  11:22 AM      =========================================  For Pharmacy Admin Tracking Only    Program: Medical Group  CPA in place:  Yes  Recommendation Provided To: Patient/Caregiver: 1 via Telephone  Intervention Detail: Patient Access Assistance/Sample Provided  Intervention Accepted By: Patient/Caregiver: 1  Time Spent (min): 10

## 2024-06-21 NOTE — CARE COORDINATION
ARU CASE MANAGEMENT NOTE:    Admission Date:  6/14/2024 Kavya De Santiago is a 64 y.o.  female    Admitted for : Meningitis [G03.9]  Encephalopathy [G93.40]    Patient is alert and oriented x4.    Spoke with patient regarding discharge plan: patient's pending medicaid has not processed at this time. Per patient, she did call [facility] and they are supposed to be processing her information today. Discussed skilled facilities with the patient, she and her  would like a referral to [facility] as they accept pending medicaid. Referral sent through [facility]. Again educated the patient that if her medicaid does go through, the plan for home health may be able to go through.     Outside appointments while in ARU: none    DME: TBD    Will continue to follow for additional discharge needs.    Electronically signed by Terrie Johnson RN on 6/21/2024 at 1:11 PM      Spoke with Sharyn in central intake with [facility]. They do not have any female beds available at this time but states that they may at the beginning of next week. Will continue to monitor for approved pending medicaid. Will call [facility] first thing next week to see if there is a bed available.    Electronically signed by Terrie Johnson RN on 6/21/2024 at 2:57 PM

## 2024-06-22 LAB
ALBUMIN SERPL-MCNC: 3.4 G/DL (ref 3.5–5.2)
ALP SERPL-CCNC: 64 U/L (ref 35–104)
ALT SERPL-CCNC: 11 U/L (ref 5–33)
ANION GAP SERPL CALCULATED.3IONS-SCNC: 11 MMOL/L (ref 9–17)
AST SERPL-CCNC: 10 U/L
BASOPHILS # BLD: 0 K/UL (ref 0–0.2)
BASOPHILS NFR BLD: 0 % (ref 0–2)
BILIRUB SERPL-MCNC: 0.2 MG/DL (ref 0.3–1.2)
BUN SERPL-MCNC: 22 MG/DL (ref 8–23)
CALCIUM SERPL-MCNC: 8.6 MG/DL (ref 8.6–10.4)
CHLORIDE SERPL-SCNC: 109 MMOL/L (ref 98–107)
CO2 SERPL-SCNC: 24 MMOL/L (ref 20–31)
CREAT SERPL-MCNC: 0.7 MG/DL (ref 0.5–0.9)
EOSINOPHIL # BLD: 0 K/UL (ref 0–0.4)
EOSINOPHILS RELATIVE PERCENT: 0 % (ref 0–4)
ERYTHROCYTE [DISTWIDTH] IN BLOOD BY AUTOMATED COUNT: 16.9 % (ref 11.5–14.9)
GFR, ESTIMATED: >90 ML/MIN/1.73M2
GLUCOSE SERPL-MCNC: 234 MG/DL (ref 70–99)
HCT VFR BLD AUTO: 26.6 % (ref 36–46)
HGB BLD-MCNC: 8.4 G/DL (ref 12–16)
LYMPHOCYTES NFR BLD: 1.7 K/UL (ref 1–4.8)
LYMPHOCYTES RELATIVE PERCENT: 26 % (ref 24–44)
MAGNESIUM SERPL-MCNC: 1.8 MG/DL (ref 1.6–2.6)
MCH RBC QN AUTO: 27.9 PG (ref 26–34)
MCHC RBC AUTO-ENTMCNC: 31.6 G/DL (ref 31–37)
MCV RBC AUTO: 88.2 FL (ref 80–100)
MONOCYTES NFR BLD: 0.7 K/UL (ref 0.1–1.3)
MONOCYTES NFR BLD: 10 % (ref 1–7)
NEUTROPHILS NFR BLD: 64 % (ref 36–66)
NEUTS SEG NFR BLD: 4.3 K/UL (ref 1.3–9.1)
PLATELET # BLD AUTO: 201 K/UL (ref 150–450)
PMV BLD AUTO: 8.3 FL (ref 6–12)
POTASSIUM SERPL-SCNC: 4.6 MMOL/L (ref 3.7–5.3)
PROT SERPL-MCNC: 5.4 G/DL (ref 6.4–8.3)
RBC # BLD AUTO: 3.02 M/UL (ref 4–5.2)
SODIUM SERPL-SCNC: 144 MMOL/L (ref 135–144)
WBC OTHER # BLD: 6.7 K/UL (ref 3.5–11)

## 2024-06-22 PROCEDURE — 6370000000 HC RX 637 (ALT 250 FOR IP): Performed by: INTERNAL MEDICINE

## 2024-06-22 PROCEDURE — 1180000000 HC REHAB R&B

## 2024-06-22 PROCEDURE — 6370000000 HC RX 637 (ALT 250 FOR IP): Performed by: PHYSICAL MEDICINE & REHABILITATION

## 2024-06-22 PROCEDURE — 6360000002 HC RX W HCPCS: Performed by: PSYCHIATRY & NEUROLOGY

## 2024-06-22 PROCEDURE — 97130 THER IVNTJ EA ADDL 15 MIN: CPT

## 2024-06-22 PROCEDURE — 97530 THERAPEUTIC ACTIVITIES: CPT

## 2024-06-22 PROCEDURE — 2580000003 HC RX 258: Performed by: NURSE PRACTITIONER

## 2024-06-22 PROCEDURE — 6360000002 HC RX W HCPCS: Performed by: STUDENT IN AN ORGANIZED HEALTH CARE EDUCATION/TRAINING PROGRAM

## 2024-06-22 PROCEDURE — 99232 SBSQ HOSP IP/OBS MODERATE 35: CPT | Performed by: STUDENT IN AN ORGANIZED HEALTH CARE EDUCATION/TRAINING PROGRAM

## 2024-06-22 PROCEDURE — 99233 SBSQ HOSP IP/OBS HIGH 50: CPT | Performed by: INTERNAL MEDICINE

## 2024-06-22 PROCEDURE — 80053 COMPREHEN METABOLIC PANEL: CPT

## 2024-06-22 PROCEDURE — 97116 GAIT TRAINING THERAPY: CPT

## 2024-06-22 PROCEDURE — 97110 THERAPEUTIC EXERCISES: CPT

## 2024-06-22 PROCEDURE — 99232 SBSQ HOSP IP/OBS MODERATE 35: CPT | Performed by: PSYCHIATRY & NEUROLOGY

## 2024-06-22 PROCEDURE — 6360000002 HC RX W HCPCS: Performed by: PHYSICAL MEDICINE & REHABILITATION

## 2024-06-22 PROCEDURE — 36415 COLL VENOUS BLD VENIPUNCTURE: CPT

## 2024-06-22 PROCEDURE — 97129 THER IVNTJ 1ST 15 MIN: CPT

## 2024-06-22 PROCEDURE — 97535 SELF CARE MNGMENT TRAINING: CPT

## 2024-06-22 PROCEDURE — 85025 COMPLETE CBC W/AUTO DIFF WBC: CPT

## 2024-06-22 PROCEDURE — 6370000000 HC RX 637 (ALT 250 FOR IP): Performed by: PSYCHIATRY & NEUROLOGY

## 2024-06-22 PROCEDURE — 83735 ASSAY OF MAGNESIUM: CPT

## 2024-06-22 PROCEDURE — 2580000003 HC RX 258: Performed by: STUDENT IN AN ORGANIZED HEALTH CARE EDUCATION/TRAINING PROGRAM

## 2024-06-22 RX ORDER — INSULIN GLARGINE 100 [IU]/ML
25 INJECTION, SOLUTION SUBCUTANEOUS DAILY
Status: DISCONTINUED | OUTPATIENT
Start: 2024-06-23 | End: 2024-06-24

## 2024-06-22 RX ORDER — MAGNESIUM SULFATE HEPTAHYDRATE 40 MG/ML
2000 INJECTION, SOLUTION INTRAVENOUS ONCE
Status: DISCONTINUED | OUTPATIENT
Start: 2024-06-22 | End: 2024-06-22

## 2024-06-22 RX ADMIN — Medication 1 CAPSULE: at 07:15

## 2024-06-22 RX ADMIN — INSULIN LISPRO 2 UNITS: 100 INJECTION, SOLUTION INTRAVENOUS; SUBCUTANEOUS at 07:19

## 2024-06-22 RX ADMIN — ANTI-FUNGAL POWDER MICONAZOLE NITRATE TALC FREE: 1.42 POWDER TOPICAL at 07:18

## 2024-06-22 RX ADMIN — ENOXAPARIN SODIUM 40 MG: 100 INJECTION SUBCUTANEOUS at 07:15

## 2024-06-22 RX ADMIN — BUSPIRONE HYDROCHLORIDE 10 MG: 5 TABLET ORAL at 14:28

## 2024-06-22 RX ADMIN — HYDRALAZINE HYDROCHLORIDE 50 MG: 50 TABLET ORAL at 14:28

## 2024-06-22 RX ADMIN — ATORVASTATIN CALCIUM 40 MG: 40 TABLET, FILM COATED ORAL at 20:05

## 2024-06-22 RX ADMIN — METOPROLOL 100 MG: 100 TABLET ORAL at 07:15

## 2024-06-22 RX ADMIN — CEFTRIAXONE SODIUM 2000 MG: 2 INJECTION, POWDER, FOR SOLUTION INTRAMUSCULAR; INTRAVENOUS at 05:43

## 2024-06-22 RX ADMIN — DEXAMETHASONE SODIUM PHOSPHATE 4 MG: 4 INJECTION INTRA-ARTICULAR; INTRALESIONAL; INTRAMUSCULAR; INTRAVENOUS; SOFT TISSUE at 17:46

## 2024-06-22 RX ADMIN — LISINOPRIL 20 MG: 20 TABLET ORAL at 07:17

## 2024-06-22 RX ADMIN — INSULIN LISPRO 4 UNITS: 100 INJECTION, SOLUTION INTRAVENOUS; SUBCUTANEOUS at 11:48

## 2024-06-22 RX ADMIN — NIFEDIPINE 30 MG: 30 TABLET, EXTENDED RELEASE ORAL at 20:14

## 2024-06-22 RX ADMIN — ROPINIROLE HYDROCHLORIDE 2 MG: 1 TABLET, FILM COATED ORAL at 20:04

## 2024-06-22 RX ADMIN — SPIRONOLACTONE 25 MG: 25 TABLET ORAL at 07:15

## 2024-06-22 RX ADMIN — LATANOPROST 1 DROP: 50 SOLUTION OPHTHALMIC at 20:14

## 2024-06-22 RX ADMIN — BUSPIRONE HYDROCHLORIDE 10 MG: 5 TABLET ORAL at 20:05

## 2024-06-22 RX ADMIN — SODIUM CHLORIDE, PRESERVATIVE FREE 10 ML: 5 INJECTION INTRAVENOUS at 20:18

## 2024-06-22 RX ADMIN — DEXAMETHASONE SODIUM PHOSPHATE 4 MG: 4 INJECTION INTRA-ARTICULAR; INTRALESIONAL; INTRAMUSCULAR; INTRAVENOUS; SOFT TISSUE at 05:35

## 2024-06-22 RX ADMIN — INSULIN LISPRO 4 UNITS: 100 INJECTION, SOLUTION INTRAVENOUS; SUBCUTANEOUS at 17:45

## 2024-06-22 RX ADMIN — INSULIN GLARGINE 22 UNITS: 100 INJECTION, SOLUTION SUBCUTANEOUS at 07:15

## 2024-06-22 RX ADMIN — ANTI-FUNGAL POWDER MICONAZOLE NITRATE TALC FREE: 1.42 POWDER TOPICAL at 20:06

## 2024-06-22 RX ADMIN — VENLAFAXINE HYDROCHLORIDE 75 MG: 75 CAPSULE, EXTENDED RELEASE ORAL at 07:15

## 2024-06-22 RX ADMIN — BUTALBITA,ACETAMINOPHEN AND CAFFEINE 1 CAPSULE: 50; 300; 40 CAPSULE ORAL at 20:29

## 2024-06-22 RX ADMIN — POLYETHYLENE GLYCOL 3350 17 G: 17 POWDER, FOR SOLUTION ORAL at 07:15

## 2024-06-22 RX ADMIN — BUSPIRONE HYDROCHLORIDE 10 MG: 5 TABLET ORAL at 07:15

## 2024-06-22 RX ADMIN — PANTOPRAZOLE SODIUM 40 MG: 40 TABLET, DELAYED RELEASE ORAL at 05:35

## 2024-06-22 RX ADMIN — HYDRALAZINE HYDROCHLORIDE 50 MG: 50 TABLET ORAL at 20:05

## 2024-06-22 RX ADMIN — METOPROLOL 100 MG: 100 TABLET ORAL at 20:05

## 2024-06-22 RX ADMIN — Medication 400 MG: at 07:15

## 2024-06-22 RX ADMIN — HYDRALAZINE HYDROCHLORIDE 50 MG: 50 TABLET ORAL at 05:35

## 2024-06-22 RX ADMIN — DIVALPROEX SODIUM 250 MG: 250 TABLET, DELAYED RELEASE ORAL at 07:15

## 2024-06-22 RX ADMIN — ASPIRIN 81 MG: 81 TABLET, COATED ORAL at 07:15

## 2024-06-22 RX ADMIN — Medication 400 MG: at 20:05

## 2024-06-22 RX ADMIN — DIVALPROEX SODIUM 250 MG: 250 TABLET, DELAYED RELEASE ORAL at 20:15

## 2024-06-22 ASSESSMENT — PAIN DESCRIPTION - FREQUENCY: FREQUENCY: INTERMITTENT

## 2024-06-22 ASSESSMENT — PAIN DESCRIPTION - ONSET: ONSET: ON-GOING

## 2024-06-22 ASSESSMENT — PAIN SCALES - WONG BAKER: WONGBAKER_NUMERICALRESPONSE: HURTS A LITTLE BIT

## 2024-06-22 ASSESSMENT — PAIN DESCRIPTION - ORIENTATION
ORIENTATION: ANTERIOR
ORIENTATION: MID

## 2024-06-22 ASSESSMENT — PAIN DESCRIPTION - LOCATION
LOCATION: HEAD
LOCATION: HEAD

## 2024-06-22 ASSESSMENT — PAIN SCALES - GENERAL
PAINLEVEL_OUTOF10: 5
PAINLEVEL_OUTOF10: 3
PAINLEVEL_OUTOF10: 2

## 2024-06-22 ASSESSMENT — PAIN DESCRIPTION - DESCRIPTORS
DESCRIPTORS: ACHING
DESCRIPTORS: ACHING

## 2024-06-22 ASSESSMENT — PAIN DESCRIPTION - PAIN TYPE: TYPE: ACUTE PAIN

## 2024-06-23 PROCEDURE — 6370000000 HC RX 637 (ALT 250 FOR IP): Performed by: PSYCHIATRY & NEUROLOGY

## 2024-06-23 PROCEDURE — 97116 GAIT TRAINING THERAPY: CPT

## 2024-06-23 PROCEDURE — 6360000002 HC RX W HCPCS: Performed by: PHYSICAL MEDICINE & REHABILITATION

## 2024-06-23 PROCEDURE — 97530 THERAPEUTIC ACTIVITIES: CPT

## 2024-06-23 PROCEDURE — 6370000000 HC RX 637 (ALT 250 FOR IP): Performed by: INTERNAL MEDICINE

## 2024-06-23 PROCEDURE — 2580000003 HC RX 258: Performed by: STUDENT IN AN ORGANIZED HEALTH CARE EDUCATION/TRAINING PROGRAM

## 2024-06-23 PROCEDURE — 97535 SELF CARE MNGMENT TRAINING: CPT

## 2024-06-23 PROCEDURE — 99231 SBSQ HOSP IP/OBS SF/LOW 25: CPT | Performed by: INTERNAL MEDICINE

## 2024-06-23 PROCEDURE — 6360000002 HC RX W HCPCS: Performed by: STUDENT IN AN ORGANIZED HEALTH CARE EDUCATION/TRAINING PROGRAM

## 2024-06-23 PROCEDURE — 97110 THERAPEUTIC EXERCISES: CPT

## 2024-06-23 PROCEDURE — 1180000000 HC REHAB R&B

## 2024-06-23 PROCEDURE — 6370000000 HC RX 637 (ALT 250 FOR IP): Performed by: PHYSICAL MEDICINE & REHABILITATION

## 2024-06-23 PROCEDURE — 6360000002 HC RX W HCPCS: Performed by: PSYCHIATRY & NEUROLOGY

## 2024-06-23 PROCEDURE — 99232 SBSQ HOSP IP/OBS MODERATE 35: CPT | Performed by: STUDENT IN AN ORGANIZED HEALTH CARE EDUCATION/TRAINING PROGRAM

## 2024-06-23 PROCEDURE — 2580000003 HC RX 258: Performed by: NURSE PRACTITIONER

## 2024-06-23 PROCEDURE — 99232 SBSQ HOSP IP/OBS MODERATE 35: CPT | Performed by: PSYCHIATRY & NEUROLOGY

## 2024-06-23 RX ADMIN — Medication 400 MG: at 07:56

## 2024-06-23 RX ADMIN — Medication 400 MG: at 20:20

## 2024-06-23 RX ADMIN — NIFEDIPINE 30 MG: 30 TABLET, EXTENDED RELEASE ORAL at 20:23

## 2024-06-23 RX ADMIN — VENLAFAXINE HYDROCHLORIDE 75 MG: 75 CAPSULE, EXTENDED RELEASE ORAL at 07:54

## 2024-06-23 RX ADMIN — Medication 1 CAPSULE: at 07:54

## 2024-06-23 RX ADMIN — BUSPIRONE HYDROCHLORIDE 10 MG: 5 TABLET ORAL at 20:20

## 2024-06-23 RX ADMIN — SODIUM CHLORIDE, PRESERVATIVE FREE 10 ML: 5 INJECTION INTRAVENOUS at 20:20

## 2024-06-23 RX ADMIN — BUTALBITA,ACETAMINOPHEN AND CAFFEINE 1 CAPSULE: 50; 300; 40 CAPSULE ORAL at 20:48

## 2024-06-23 RX ADMIN — ENOXAPARIN SODIUM 40 MG: 100 INJECTION SUBCUTANEOUS at 07:54

## 2024-06-23 RX ADMIN — PANTOPRAZOLE SODIUM 40 MG: 40 TABLET, DELAYED RELEASE ORAL at 05:44

## 2024-06-23 RX ADMIN — HYDRALAZINE HYDROCHLORIDE 50 MG: 50 TABLET ORAL at 20:20

## 2024-06-23 RX ADMIN — INSULIN LISPRO 4 UNITS: 100 INJECTION, SOLUTION INTRAVENOUS; SUBCUTANEOUS at 12:00

## 2024-06-23 RX ADMIN — DIVALPROEX SODIUM 250 MG: 250 TABLET, DELAYED RELEASE ORAL at 20:22

## 2024-06-23 RX ADMIN — BUSPIRONE HYDROCHLORIDE 10 MG: 5 TABLET ORAL at 14:25

## 2024-06-23 RX ADMIN — POLYETHYLENE GLYCOL 3350 17 G: 17 POWDER, FOR SOLUTION ORAL at 07:56

## 2024-06-23 RX ADMIN — ATORVASTATIN CALCIUM 40 MG: 40 TABLET, FILM COATED ORAL at 20:20

## 2024-06-23 RX ADMIN — METOPROLOL 100 MG: 100 TABLET ORAL at 07:54

## 2024-06-23 RX ADMIN — HYDRALAZINE HYDROCHLORIDE 50 MG: 50 TABLET ORAL at 05:55

## 2024-06-23 RX ADMIN — LATANOPROST 1 DROP: 50 SOLUTION OPHTHALMIC at 20:23

## 2024-06-23 RX ADMIN — SPIRONOLACTONE 25 MG: 25 TABLET ORAL at 07:54

## 2024-06-23 RX ADMIN — DIVALPROEX SODIUM 250 MG: 250 TABLET, DELAYED RELEASE ORAL at 07:58

## 2024-06-23 RX ADMIN — ASPIRIN 81 MG: 81 TABLET, COATED ORAL at 07:54

## 2024-06-23 RX ADMIN — INSULIN LISPRO 2 UNITS: 100 INJECTION, SOLUTION INTRAVENOUS; SUBCUTANEOUS at 16:42

## 2024-06-23 RX ADMIN — HYDRALAZINE HYDROCHLORIDE 50 MG: 50 TABLET ORAL at 15:08

## 2024-06-23 RX ADMIN — BUSPIRONE HYDROCHLORIDE 10 MG: 5 TABLET ORAL at 07:54

## 2024-06-23 RX ADMIN — METOPROLOL 100 MG: 100 TABLET ORAL at 20:20

## 2024-06-23 RX ADMIN — DEXAMETHASONE SODIUM PHOSPHATE 4 MG: 4 INJECTION INTRA-ARTICULAR; INTRALESIONAL; INTRAMUSCULAR; INTRAVENOUS; SOFT TISSUE at 05:44

## 2024-06-23 RX ADMIN — CEFTRIAXONE SODIUM 2000 MG: 2 INJECTION, POWDER, FOR SOLUTION INTRAMUSCULAR; INTRAVENOUS at 05:57

## 2024-06-23 RX ADMIN — SODIUM CHLORIDE, PRESERVATIVE FREE 10 ML: 5 INJECTION INTRAVENOUS at 07:57

## 2024-06-23 RX ADMIN — ROPINIROLE HYDROCHLORIDE 2 MG: 1 TABLET, FILM COATED ORAL at 20:20

## 2024-06-23 RX ADMIN — ANTI-FUNGAL POWDER MICONAZOLE NITRATE TALC FREE: 1.42 POWDER TOPICAL at 20:22

## 2024-06-23 RX ADMIN — INSULIN GLARGINE 25 UNITS: 100 INJECTION, SOLUTION SUBCUTANEOUS at 07:54

## 2024-06-23 RX ADMIN — INSULIN LISPRO 4 UNITS: 100 INJECTION, SOLUTION INTRAVENOUS; SUBCUTANEOUS at 20:49

## 2024-06-23 RX ADMIN — LISINOPRIL 20 MG: 20 TABLET ORAL at 07:54

## 2024-06-23 ASSESSMENT — PAIN DESCRIPTION - ORIENTATION
ORIENTATION: ANTERIOR
ORIENTATION: ANTERIOR

## 2024-06-23 ASSESSMENT — PAIN DESCRIPTION - DESCRIPTORS
DESCRIPTORS: ACHING
DESCRIPTORS: ACHING

## 2024-06-23 ASSESSMENT — PAIN DESCRIPTION - PAIN TYPE: TYPE: ACUTE PAIN

## 2024-06-23 ASSESSMENT — PAIN SCALES - GENERAL
PAINLEVEL_OUTOF10: 4
PAINLEVEL_OUTOF10: 4

## 2024-06-23 ASSESSMENT — PAIN SCALES - WONG BAKER: WONGBAKER_NUMERICALRESPONSE: HURTS A LITTLE BIT

## 2024-06-23 ASSESSMENT — PAIN DESCRIPTION - FREQUENCY: FREQUENCY: INTERMITTENT

## 2024-06-23 ASSESSMENT — PAIN DESCRIPTION - ONSET: ONSET: ON-GOING

## 2024-06-23 ASSESSMENT — PAIN DESCRIPTION - LOCATION
LOCATION: NECK
LOCATION: HEAD

## 2024-06-24 LAB
ANION GAP SERPL CALCULATED.3IONS-SCNC: 9 MMOL/L (ref 9–17)
BUN SERPL-MCNC: 14 MG/DL (ref 8–23)
CALCIUM SERPL-MCNC: 8.5 MG/DL (ref 8.6–10.4)
CHLORIDE SERPL-SCNC: 104 MMOL/L (ref 98–107)
CO2 SERPL-SCNC: 28 MMOL/L (ref 20–31)
CREAT SERPL-MCNC: 0.7 MG/DL (ref 0.5–0.9)
GFR, ESTIMATED: >90 ML/MIN/1.73M2
GLUCOSE SERPL-MCNC: 235 MG/DL (ref 70–99)
HCT VFR BLD AUTO: 30.5 % (ref 36–46)
HGB BLD-MCNC: 9.5 G/DL (ref 12–16)
POTASSIUM SERPL-SCNC: 3.8 MMOL/L (ref 3.7–5.3)
SODIUM SERPL-SCNC: 141 MMOL/L (ref 135–144)

## 2024-06-24 PROCEDURE — 36415 COLL VENOUS BLD VENIPUNCTURE: CPT

## 2024-06-24 PROCEDURE — 97116 GAIT TRAINING THERAPY: CPT

## 2024-06-24 PROCEDURE — 6360000002 HC RX W HCPCS: Performed by: PHYSICAL MEDICINE & REHABILITATION

## 2024-06-24 PROCEDURE — 97530 THERAPEUTIC ACTIVITIES: CPT

## 2024-06-24 PROCEDURE — 99232 SBSQ HOSP IP/OBS MODERATE 35: CPT | Performed by: INTERNAL MEDICINE

## 2024-06-24 PROCEDURE — 97110 THERAPEUTIC EXERCISES: CPT

## 2024-06-24 PROCEDURE — 6370000000 HC RX 637 (ALT 250 FOR IP): Performed by: INTERNAL MEDICINE

## 2024-06-24 PROCEDURE — 85018 HEMOGLOBIN: CPT

## 2024-06-24 PROCEDURE — 6370000000 HC RX 637 (ALT 250 FOR IP): Performed by: PSYCHIATRY & NEUROLOGY

## 2024-06-24 PROCEDURE — 99222 1ST HOSP IP/OBS MODERATE 55: CPT | Performed by: INTERNAL MEDICINE

## 2024-06-24 PROCEDURE — 1180000000 HC REHAB R&B

## 2024-06-24 PROCEDURE — 80048 BASIC METABOLIC PNL TOTAL CA: CPT

## 2024-06-24 PROCEDURE — 97130 THER IVNTJ EA ADDL 15 MIN: CPT

## 2024-06-24 PROCEDURE — 6360000002 HC RX W HCPCS: Performed by: STUDENT IN AN ORGANIZED HEALTH CARE EDUCATION/TRAINING PROGRAM

## 2024-06-24 PROCEDURE — 97535 SELF CARE MNGMENT TRAINING: CPT

## 2024-06-24 PROCEDURE — 85014 HEMATOCRIT: CPT

## 2024-06-24 PROCEDURE — 2580000003 HC RX 258: Performed by: NURSE PRACTITIONER

## 2024-06-24 PROCEDURE — 97129 THER IVNTJ 1ST 15 MIN: CPT

## 2024-06-24 PROCEDURE — 2580000003 HC RX 258: Performed by: STUDENT IN AN ORGANIZED HEALTH CARE EDUCATION/TRAINING PROGRAM

## 2024-06-24 PROCEDURE — 6370000000 HC RX 637 (ALT 250 FOR IP): Performed by: PHYSICAL MEDICINE & REHABILITATION

## 2024-06-24 RX ORDER — INSULIN GLARGINE 100 [IU]/ML
28 INJECTION, SOLUTION SUBCUTANEOUS DAILY
Status: DISCONTINUED | OUTPATIENT
Start: 2024-06-25 | End: 2024-06-27

## 2024-06-24 RX ADMIN — METOPROLOL 100 MG: 100 TABLET ORAL at 20:07

## 2024-06-24 RX ADMIN — Medication 1 CAPSULE: at 08:06

## 2024-06-24 RX ADMIN — DICLOFENAC SODIUM 2 G: 10 GEL TOPICAL at 20:23

## 2024-06-24 RX ADMIN — Medication 400 MG: at 20:15

## 2024-06-24 RX ADMIN — CEFTRIAXONE SODIUM 2000 MG: 2 INJECTION, POWDER, FOR SOLUTION INTRAMUSCULAR; INTRAVENOUS at 05:24

## 2024-06-24 RX ADMIN — ATORVASTATIN CALCIUM 40 MG: 40 TABLET, FILM COATED ORAL at 20:07

## 2024-06-24 RX ADMIN — VENLAFAXINE HYDROCHLORIDE 75 MG: 75 CAPSULE, EXTENDED RELEASE ORAL at 08:06

## 2024-06-24 RX ADMIN — NIFEDIPINE 30 MG: 30 TABLET, EXTENDED RELEASE ORAL at 20:17

## 2024-06-24 RX ADMIN — INSULIN GLARGINE 25 UNITS: 100 INJECTION, SOLUTION SUBCUTANEOUS at 08:06

## 2024-06-24 RX ADMIN — PANTOPRAZOLE SODIUM 40 MG: 40 TABLET, DELAYED RELEASE ORAL at 05:53

## 2024-06-24 RX ADMIN — Medication 400 MG: at 08:06

## 2024-06-24 RX ADMIN — ONDANSETRON 4 MG: 4 TABLET, ORALLY DISINTEGRATING ORAL at 10:47

## 2024-06-24 RX ADMIN — HYDRALAZINE HYDROCHLORIDE 50 MG: 50 TABLET ORAL at 14:44

## 2024-06-24 RX ADMIN — ROPINIROLE HYDROCHLORIDE 2 MG: 1 TABLET, FILM COATED ORAL at 20:08

## 2024-06-24 RX ADMIN — SODIUM CHLORIDE, PRESERVATIVE FREE 10 ML: 5 INJECTION INTRAVENOUS at 10:49

## 2024-06-24 RX ADMIN — ANTI-FUNGAL POWDER MICONAZOLE NITRATE TALC FREE: 1.42 POWDER TOPICAL at 20:18

## 2024-06-24 RX ADMIN — INSULIN LISPRO 2 UNITS: 100 INJECTION, SOLUTION INTRAVENOUS; SUBCUTANEOUS at 12:03

## 2024-06-24 RX ADMIN — LATANOPROST 1 DROP: 50 SOLUTION OPHTHALMIC at 20:19

## 2024-06-24 RX ADMIN — SODIUM CHLORIDE, PRESERVATIVE FREE 10 ML: 5 INJECTION INTRAVENOUS at 20:18

## 2024-06-24 RX ADMIN — HYDRALAZINE HYDROCHLORIDE 50 MG: 50 TABLET ORAL at 05:53

## 2024-06-24 RX ADMIN — BUSPIRONE HYDROCHLORIDE 10 MG: 5 TABLET ORAL at 20:09

## 2024-06-24 RX ADMIN — INSULIN LISPRO 2 UNITS: 100 INJECTION, SOLUTION INTRAVENOUS; SUBCUTANEOUS at 16:55

## 2024-06-24 RX ADMIN — DIVALPROEX SODIUM 250 MG: 250 TABLET, DELAYED RELEASE ORAL at 20:17

## 2024-06-24 RX ADMIN — POLYETHYLENE GLYCOL 3350 17 G: 17 POWDER, FOR SOLUTION ORAL at 08:06

## 2024-06-24 RX ADMIN — BUSPIRONE HYDROCHLORIDE 10 MG: 5 TABLET ORAL at 08:06

## 2024-06-24 RX ADMIN — BUTALBITA,ACETAMINOPHEN AND CAFFEINE 1 CAPSULE: 50; 300; 40 CAPSULE ORAL at 03:26

## 2024-06-24 RX ADMIN — DIVALPROEX SODIUM 250 MG: 250 TABLET, DELAYED RELEASE ORAL at 08:07

## 2024-06-24 RX ADMIN — SPIRONOLACTONE 25 MG: 25 TABLET ORAL at 08:06

## 2024-06-24 RX ADMIN — BUSPIRONE HYDROCHLORIDE 10 MG: 5 TABLET ORAL at 14:44

## 2024-06-24 RX ADMIN — ENOXAPARIN SODIUM 40 MG: 100 INJECTION SUBCUTANEOUS at 08:07

## 2024-06-24 RX ADMIN — LISINOPRIL 20 MG: 20 TABLET ORAL at 08:06

## 2024-06-24 RX ADMIN — BUTALBITA,ACETAMINOPHEN AND CAFFEINE 1 CAPSULE: 50; 300; 40 CAPSULE ORAL at 20:09

## 2024-06-24 RX ADMIN — METOPROLOL 100 MG: 100 TABLET ORAL at 08:06

## 2024-06-24 RX ADMIN — ASPIRIN 81 MG: 81 TABLET, COATED ORAL at 08:06

## 2024-06-24 ASSESSMENT — PAIN DESCRIPTION - ORIENTATION
ORIENTATION: ANTERIOR
ORIENTATION: ANTERIOR
ORIENTATION: MID
ORIENTATION: MID

## 2024-06-24 ASSESSMENT — PAIN DESCRIPTION - LOCATION
LOCATION: HEAD
LOCATION: HEAD;KNEE
LOCATION: HEAD

## 2024-06-24 ASSESSMENT — PAIN SCALES - GENERAL
PAINLEVEL_OUTOF10: 2
PAINLEVEL_OUTOF10: 4
PAINLEVEL_OUTOF10: 1
PAINLEVEL_OUTOF10: 4
PAINLEVEL_OUTOF10: 3
PAINLEVEL_OUTOF10: 4
PAINLEVEL_OUTOF10: 3
PAINLEVEL_OUTOF10: 2

## 2024-06-24 ASSESSMENT — PAIN SCALES - WONG BAKER
WONGBAKER_NUMERICALRESPONSE: HURTS A LITTLE BIT

## 2024-06-24 ASSESSMENT — PAIN DESCRIPTION - DESCRIPTORS
DESCRIPTORS: ACHING;GNAWING
DESCRIPTORS: ACHING
DESCRIPTORS: ACHING;GNAWING
DESCRIPTORS: ACHING

## 2024-06-24 ASSESSMENT — PAIN DESCRIPTION - FREQUENCY: FREQUENCY: INTERMITTENT

## 2024-06-24 ASSESSMENT — PAIN DESCRIPTION - PAIN TYPE: TYPE: ACUTE PAIN

## 2024-06-24 ASSESSMENT — PAIN DESCRIPTION - ONSET: ONSET: ON-GOING

## 2024-06-24 NOTE — DISCHARGE INSTR - COC
MANAGEMENT/SOCIAL WORK SECTION    Inpatient Status Date: ***    Readmission Risk Assessment Score:  Readmission Risk              Risk of Unplanned Readmission:  24           Discharging to Facility/ Agency   Name:   Address:  Phone:  Fax:    Dialysis Facility (if applicable)   Name:  Address:  Dialysis Schedule:  Phone:  Fax:    / signature: {Esignature:[id number]}    PHYSICIAN SECTION    Prognosis: Good    Condition at Discharge: Stable    Rehab Potential (if transferring to Rehab): Good    Recommended Labs or Other Treatments After Discharge: Physical and occupational therapy for ongoing functional deficits related to meningitis, left frontal lobe infarct.  Speech therapy for ongoing cognitive deficits.  Nursing for medication management.    , follow up 1.PCP 1-2 weeks, 2.Neurology, 3.GI, 4.ENT Dr. Villaseñor or [facility] Mastoiditis, 5. pulmonary, 6. ID - Dr Harris, 6. Cardiology - Holter monitor    CBC/CMP 1 week- 7/7/24  complete IV ceftriaxone 2 g q 24 hours through 7/10/2024- 6-week course for mastoiditis     Accu-Cheks ACHS, insulin management,  new to insulin    continue PT/OT/speech/nursing    No driviing    Physician Certification: I certify the above information and transfer of Kavya De Santiago  is necessary for the continuing treatment of the diagnosis listed and that she requires Home Care for less 30 days.     Update Admission H&P: No change in H&P    PHYSICIAN SIGNATURE:  {Esignature:[id number]}

## 2024-06-24 NOTE — DISCHARGE INSTR - DIET

## 2024-06-24 NOTE — CARE COORDINATION
ARU CASE MANAGEMENT NOTE:    Admission Date:  6/14/2024 Kavya De Santiago is a 64 y.o.  female    Admitted for : Meningitis [G03.9]  Encephalopathy [G93.40]    RN CM received an update from Indigo ([facility]) that the patient's pending Medicaid was denied. She stated that based on the information provided to her at the time of application, the patient should have been approved. CHIDI spoke with the patient regarding the denial of the pending medicaid application. The patient will call [facility] to enquire about the reasoning behind the denial. If the patient is truly denied and there is no chance of the determination changing, the patient will be unable to go to SNF for the remainder of her antibiotic infusion. She would have to be able to pay OOP for home infusion or a stay at a SNF - this is not an option for her financially. Will discuss with the team in IDT tomorrow.      DME:TTB - patient will need to purchase OOP    Will continue to follow for additional discharge needs.    Electronically signed by Terrie Johnson RN on 6/24/2024 at 10:27 AM

## 2024-06-24 NOTE — INTERDISCIPLINARY ROUNDS
[facility] Acute Inpatient Rehabilitation  INTERDISCIPLINARY MEETING  Date: 24  Patient Name: Kavya De Santiago       Room: 2608/2608-01  MRN: [id number]       : 1960  (64 y.o.)     Gender: female     Patient's care team, including nursing, speech language pathologist, occupational therapist, and/or physical therapist, met to discuss patient's care needs. Any patient concerns, change in medical status, and current transfer/mobility status were identified at this time.     Any Additional Pertinent Information:   Not applicable.     Participating Team Members:  Nurse: Joyce Ellington, RN , Zain Jenkins, RN    Physical Therapist: Eda Johnson  PT

## 2024-06-25 PROCEDURE — 6360000002 HC RX W HCPCS: Performed by: PHYSICAL MEDICINE & REHABILITATION

## 2024-06-25 PROCEDURE — 6370000000 HC RX 637 (ALT 250 FOR IP): Performed by: INTERNAL MEDICINE

## 2024-06-25 PROCEDURE — 97116 GAIT TRAINING THERAPY: CPT

## 2024-06-25 PROCEDURE — 2580000003 HC RX 258: Performed by: NURSE PRACTITIONER

## 2024-06-25 PROCEDURE — 99232 SBSQ HOSP IP/OBS MODERATE 35: CPT | Performed by: PHYSICAL MEDICINE & REHABILITATION

## 2024-06-25 PROCEDURE — 97530 THERAPEUTIC ACTIVITIES: CPT

## 2024-06-25 PROCEDURE — 99232 SBSQ HOSP IP/OBS MODERATE 35: CPT | Performed by: INTERNAL MEDICINE

## 2024-06-25 PROCEDURE — 97129 THER IVNTJ 1ST 15 MIN: CPT

## 2024-06-25 PROCEDURE — 6370000000 HC RX 637 (ALT 250 FOR IP): Performed by: PSYCHIATRY & NEUROLOGY

## 2024-06-25 PROCEDURE — 6360000002 HC RX W HCPCS: Performed by: STUDENT IN AN ORGANIZED HEALTH CARE EDUCATION/TRAINING PROGRAM

## 2024-06-25 PROCEDURE — 6370000000 HC RX 637 (ALT 250 FOR IP): Performed by: PHYSICAL MEDICINE & REHABILITATION

## 2024-06-25 PROCEDURE — 2580000003 HC RX 258: Performed by: STUDENT IN AN ORGANIZED HEALTH CARE EDUCATION/TRAINING PROGRAM

## 2024-06-25 PROCEDURE — 97130 THER IVNTJ EA ADDL 15 MIN: CPT

## 2024-06-25 PROCEDURE — 97535 SELF CARE MNGMENT TRAINING: CPT

## 2024-06-25 PROCEDURE — 97110 THERAPEUTIC EXERCISES: CPT

## 2024-06-25 PROCEDURE — 1180000000 HC REHAB R&B

## 2024-06-25 RX ADMIN — Medication 400 MG: at 08:00

## 2024-06-25 RX ADMIN — SODIUM CHLORIDE, PRESERVATIVE FREE 10 ML: 5 INJECTION INTRAVENOUS at 20:03

## 2024-06-25 RX ADMIN — DIVALPROEX SODIUM 250 MG: 250 TABLET, DELAYED RELEASE ORAL at 08:03

## 2024-06-25 RX ADMIN — ANTI-FUNGAL POWDER MICONAZOLE NITRATE TALC FREE: 1.42 POWDER TOPICAL at 19:59

## 2024-06-25 RX ADMIN — HYDRALAZINE HYDROCHLORIDE 50 MG: 50 TABLET ORAL at 20:00

## 2024-06-25 RX ADMIN — CEFTRIAXONE SODIUM 2000 MG: 2 INJECTION, POWDER, FOR SOLUTION INTRAMUSCULAR; INTRAVENOUS at 05:55

## 2024-06-25 RX ADMIN — LISINOPRIL 20 MG: 20 TABLET ORAL at 08:00

## 2024-06-25 RX ADMIN — BUSPIRONE HYDROCHLORIDE 10 MG: 5 TABLET ORAL at 15:07

## 2024-06-25 RX ADMIN — Medication 400 MG: at 20:00

## 2024-06-25 RX ADMIN — HYDRALAZINE HYDROCHLORIDE 50 MG: 50 TABLET ORAL at 15:07

## 2024-06-25 RX ADMIN — Medication 1 CAPSULE: at 08:00

## 2024-06-25 RX ADMIN — VENLAFAXINE HYDROCHLORIDE 75 MG: 75 CAPSULE, EXTENDED RELEASE ORAL at 08:00

## 2024-06-25 RX ADMIN — SPIRONOLACTONE 25 MG: 25 TABLET ORAL at 08:00

## 2024-06-25 RX ADMIN — METOPROLOL 100 MG: 100 TABLET ORAL at 07:59

## 2024-06-25 RX ADMIN — HYDRALAZINE HYDROCHLORIDE 50 MG: 50 TABLET ORAL at 06:02

## 2024-06-25 RX ADMIN — BUSPIRONE HYDROCHLORIDE 10 MG: 5 TABLET ORAL at 08:00

## 2024-06-25 RX ADMIN — ENOXAPARIN SODIUM 40 MG: 100 INJECTION SUBCUTANEOUS at 07:59

## 2024-06-25 RX ADMIN — METOPROLOL 100 MG: 100 TABLET ORAL at 20:00

## 2024-06-25 RX ADMIN — INSULIN GLARGINE 28 UNITS: 100 INJECTION, SOLUTION SUBCUTANEOUS at 08:01

## 2024-06-25 RX ADMIN — DIVALPROEX SODIUM 250 MG: 250 TABLET, DELAYED RELEASE ORAL at 20:00

## 2024-06-25 RX ADMIN — BUTALBITA,ACETAMINOPHEN AND CAFFEINE 1 CAPSULE: 50; 300; 40 CAPSULE ORAL at 08:44

## 2024-06-25 RX ADMIN — PANTOPRAZOLE SODIUM 40 MG: 40 TABLET, DELAYED RELEASE ORAL at 06:02

## 2024-06-25 RX ADMIN — LATANOPROST 1 DROP: 50 SOLUTION OPHTHALMIC at 20:00

## 2024-06-25 RX ADMIN — ASPIRIN 81 MG: 81 TABLET, COATED ORAL at 08:00

## 2024-06-25 RX ADMIN — SODIUM CHLORIDE, PRESERVATIVE FREE 10 ML: 5 INJECTION INTRAVENOUS at 08:11

## 2024-06-25 RX ADMIN — ROPINIROLE HYDROCHLORIDE 2 MG: 1 TABLET, FILM COATED ORAL at 19:59

## 2024-06-25 RX ADMIN — BUSPIRONE HYDROCHLORIDE 10 MG: 5 TABLET ORAL at 20:00

## 2024-06-25 RX ADMIN — NIFEDIPINE 30 MG: 30 TABLET, EXTENDED RELEASE ORAL at 20:00

## 2024-06-25 RX ADMIN — ATORVASTATIN CALCIUM 40 MG: 40 TABLET, FILM COATED ORAL at 20:00

## 2024-06-25 RX ADMIN — ANTI-FUNGAL POWDER MICONAZOLE NITRATE TALC FREE: 1.42 POWDER TOPICAL at 08:02

## 2024-06-25 ASSESSMENT — PAIN SCALES - WONG BAKER
WONGBAKER_NUMERICALRESPONSE: HURTS A LITTLE BIT

## 2024-06-25 ASSESSMENT — PAIN DESCRIPTION - DESCRIPTORS
DESCRIPTORS: ACHING

## 2024-06-25 ASSESSMENT — PAIN SCALES - GENERAL
PAINLEVEL_OUTOF10: 3
PAINLEVEL_OUTOF10: 1
PAINLEVEL_OUTOF10: 3
PAINLEVEL_OUTOF10: 3

## 2024-06-25 ASSESSMENT — PAIN DESCRIPTION - LOCATION
LOCATION: NECK

## 2024-06-25 NOTE — CARE COORDINATION
ARU CASE MANAGEMENT NOTE:    Admission Date:  6/14/2024 Kavya De Santiago is a 64 y.o.  female    Admitted for : Meningitis [G03.9]  Encephalopathy [G93.40]    Spoke with patient regarding her medicaid application. Per patient, she received a call from Saima at the [facility] [employer] department. She was told that they would be \"opening\" her for [place] Medicaid for the months of June and July. LEELA CM emailed Indigo Hernandez (public benefits) and Etta Owusu (registration) to determine how to get this updated on her hospital account. Once it has been updated, patient will need to be set up with [facility] and infusion. Will follow for updates.      Electronically signed by Terrie Johnson RN on 6/25/2024 at 1:14 PM

## 2024-06-25 NOTE — CONSULTS
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  Dr. Haynes discussed case with ENT, no intervention recommend.  Follow-up with ENT on discharge  Follow CBC and renal function  Supportive care.        Infection Control Recommendations   Gay Precautions      Antimicrobial Stewardship Recommendations   Simplification of therapy  Targeted therapy      History of Present Illness:   Initial history:  Kavya De Santiago is a 64 y.o.-year-old female was seen at [facility] for mastoiditis.  The patient was a admitted initially to [facility] with altered mental status associated with nausea, vomiting and diarrhea.   According to her  she had congestion, headache and rhinorrhea for 1 week prior to admission associated with dry cough and decreased appetite.  The patient was found on the ground on the day of admission, EMS called.  The patient was intubated at the ER.  The patient was hypothermic initially then was running high-grade fever with temperature max of 105, hypotensive, required pressors.  CT head showed no acute abnormality, CTA chest showed no evidence of PE, CT abdomen pelvis showed pancolitis with abnormal wall thickening of the ascending, transverse, descending colon, sigmoid colon and rectum.   Initial lactic acid 3.3, WBC 11.5, procalcitonin 11.4  COVID-19 influenza combo negative.  Blood cultures grew Haemophilus influenzae sensitivity  sensitive to ampicillin, ceftriaxone with RADHA of <= 0.06 and 
    [facility]   IN-PATIENT SERVICE   [facility]    Consult note            Date:   6/15/2024  Patient name:  Kavya De Santiago  Date of admission:  6/14/2024  8:59 PM  MRN:   [id number]  Account:  [id number]  YOB: 1960  PCP:    Shaina Hamilton MD  Room:   [address] [place] [address]  Code Status:    Prior    Chief Complaint:     No chief complaint on file.      History Obtained From:     patient    History of Present Illness:     64-year-old female with extensive stay in ICU with acute hypoxic Respiratory failure septic shock 2/2 H. influenzae bacteremia and meningitis, CSF studies positive, CT scan of abdomen also showed colitis, patient received ceftriaxone IV, and presumed C. difficile infection 10-day course of oral vancomycin.  During ICU stay,  also found that patient has acute stroke on MRI, EMANUEL was ordered with concerns for vegetation, EMANUEL negative for any vegetation, MRI also showed mastoiditis, ID was on board throughout, patient is to remain on IV ceftriaxone till 07/12/2024 for 6-week course of mastoiditis.     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      an episode of 30 runs of V. tach, patient remained asymptomatic, cardiology was taken on board, patient potassium and magnesium corrected, she also underwent stress test which is low risk.    Currently admitted at [facility] for further care    Past Medical History:     Past Medical History:   Diagnosis Date    Cervical spondylosis 04/2009    c-4 c-5, c-5 c-6, c-6 c-7    CKD (chronic kidney disease)     per pt, does not have nephrologist    COVID-19 12/06/2021    nasal congestion, fatique and myalgia x 
seen       Past Surgical  History:     Past Surgical History:   Procedure Laterality Date    CARPAL TUNNEL RELEASE Left 2022    CUBITAL TUNNEL DECOMPRESSION performed by Candice Harvey DO at [facility] OR     SECTION      x2    COLONOSCOPY  2012    ? polyps per pt    HYSTERECTOMY, TOTAL ABDOMINAL (CERVIX REMOVED)      with BSO    OTHER SURGICAL HISTORY Left 2022    cubital tunnel release    SHOULDER SURGERY Left 2021    SHOULDER MANIPULATION WITH PRE OP INTERSCALENE BLOCK and intraop injection [facility] Ahsan Brown DO [facility]    SPINE SURGERY Left 10/03/2022    S1 nerve root injection, Dr. Alston    SPINE SURGERY Left 10/06/2022    S1 NERVE ROOT INJECTION performed by Rema Alston MD at [facility] OR    TONSILLECTOMY      as child    UPPER GASTROINTESTINAL ENDOSCOPY  2012    [facility]       Medications:      insulin glargine  28 Units SubCUTAneous Daily    busPIRone  10 mg Oral TID    divalproex  250 mg Oral BID    insulin lispro  0-8 Units SubCUTAneous TID WC    insulin lispro  0-4 Units SubCUTAneous Nightly    magnesium oxide  400 mg Oral BID    hydrALAZINE  50 mg Oral 3 times per day    NIFEdipine  30 mg Oral Nightly    lisinopril  20 mg Oral Daily    pantoprazole  40 mg Oral QAM AC    cefTRIAXone (ROCEPHIN) IV  2,000 mg IntraVENous Q24H    lactobacillus  1 capsule Oral Daily with breakfast    sodium chloride flush  5-40 mL IntraVENous BID    rOPINIRole  2 mg Oral Nightly    latanoprost  1 drop Both Eyes Nightly    polyethylene glycol  17 g Oral Daily    atorvastatin  40 mg Oral Nightly    aspirin  81 mg Oral Daily    metoprolol tartrate  100 mg Oral BID    miconazole   Topical BID    venlafaxine  75 mg Oral Daily    spironolactone  25 mg Oral Daily    enoxaparin  40 mg SubCUTAneous Daily       Social History:     Social History     Socioeconomic History    Marital status:      Spouse name: Not on file    Number of children: Not on file    Years of 
of the orbits demonstrate no acute abnormality.    SINUSES: Mucosal thickening and fluid throughout the paranasal sinuses.  There are bilateral mastoid effusions.    BONES/SOFT TISSUES: The bone marrow signal intensity appears normal. The soft  tissues demonstrate no acute abnormality.    Impression  1. Tiny acute to subacute acute infarct in the left posterior frontal lobe.  2. No definite MR evidence of meningitis/encephalitis.  The findings were sent to the [facility]iology [facility] at 5:53  pm on 6/3/2024 to be communicated to a licensed caregiver.      Results for orders placed during the hospital encounter of 06/14/24    CT HEAD WO CONTRAST    Narrative  EXAMINATION:  CT OF THE HEAD WITHOUT CONTRAST  6/18/2024 2:02 pm    TECHNIQUE:  CT of the head was performed without the administration of intravenous  contrast. Automated exposure control, iterative reconstruction, and/or weight  based adjustment of the mA/kV was utilized to reduce the radiation dose to as  low as reasonably achievable.    COMPARISON:  CT brain 06/13/2024    HISTORY:  ORDERING SYSTEM PROVIDED HISTORY: Patient recently diagnosed with meningitis,  having significant headache last night and for the last few days, also has  bilateral ear pain, please evaluate  TECHNOLOGIST PROVIDED HISTORY:  Patient recently diagnosed with meningitis, having significant headache last  night and for the last few days, also has bilateral ear pain, please evaluate    Reason for Exam: Patient recently diagnosed with meningitis, having  significant headache last night and for the last few days, also has bilateral  ear pain, please evaluate  Additional signs and symptoms: pt states headache since admission    FINDINGS:  BRAIN/VENTRICLES: There is no acute intracranial hemorrhage, mass effect or  midline shift.  No abnormal extra-axial fluid collection.  The gray-white  differentiation is maintained without evidence of an acute infarct.  There is  mild

## 2024-06-26 PROCEDURE — 6370000000 HC RX 637 (ALT 250 FOR IP): Performed by: INTERNAL MEDICINE

## 2024-06-26 PROCEDURE — 97130 THER IVNTJ EA ADDL 15 MIN: CPT

## 2024-06-26 PROCEDURE — 2580000003 HC RX 258: Performed by: STUDENT IN AN ORGANIZED HEALTH CARE EDUCATION/TRAINING PROGRAM

## 2024-06-26 PROCEDURE — 6360000002 HC RX W HCPCS: Performed by: STUDENT IN AN ORGANIZED HEALTH CARE EDUCATION/TRAINING PROGRAM

## 2024-06-26 PROCEDURE — 97535 SELF CARE MNGMENT TRAINING: CPT

## 2024-06-26 PROCEDURE — 99231 SBSQ HOSP IP/OBS SF/LOW 25: CPT | Performed by: INTERNAL MEDICINE

## 2024-06-26 PROCEDURE — 1180000000 HC REHAB R&B

## 2024-06-26 PROCEDURE — 99232 SBSQ HOSP IP/OBS MODERATE 35: CPT | Performed by: PHYSICAL MEDICINE & REHABILITATION

## 2024-06-26 PROCEDURE — 6370000000 HC RX 637 (ALT 250 FOR IP): Performed by: PHYSICAL MEDICINE & REHABILITATION

## 2024-06-26 PROCEDURE — 97110 THERAPEUTIC EXERCISES: CPT

## 2024-06-26 PROCEDURE — 6370000000 HC RX 637 (ALT 250 FOR IP): Performed by: NURSE PRACTITIONER

## 2024-06-26 PROCEDURE — 6360000002 HC RX W HCPCS: Performed by: PHYSICAL MEDICINE & REHABILITATION

## 2024-06-26 PROCEDURE — 97129 THER IVNTJ 1ST 15 MIN: CPT

## 2024-06-26 PROCEDURE — 2580000003 HC RX 258: Performed by: NURSE PRACTITIONER

## 2024-06-26 PROCEDURE — 97116 GAIT TRAINING THERAPY: CPT

## 2024-06-26 PROCEDURE — 97530 THERAPEUTIC ACTIVITIES: CPT

## 2024-06-26 PROCEDURE — 99232 SBSQ HOSP IP/OBS MODERATE 35: CPT | Performed by: INTERNAL MEDICINE

## 2024-06-26 PROCEDURE — 6370000000 HC RX 637 (ALT 250 FOR IP): Performed by: PSYCHIATRY & NEUROLOGY

## 2024-06-26 RX ORDER — SPIRONOLACTONE 25 MG/1
25 TABLET ORAL DAILY
Qty: 30 TABLET | Refills: 3 | Status: CANCELLED | OUTPATIENT
Start: 2024-06-26

## 2024-06-26 RX ORDER — INSULIN GLARGINE 100 [IU]/ML
28 INJECTION, SOLUTION SUBCUTANEOUS DAILY
Qty: 10 ML | Refills: 3 | Status: CANCELLED | OUTPATIENT
Start: 2024-06-26

## 2024-06-26 RX ORDER — HYDRALAZINE HYDROCHLORIDE 50 MG/1
50 TABLET, FILM COATED ORAL EVERY 8 HOURS SCHEDULED
Qty: 90 TABLET | Refills: 3 | Status: CANCELLED | OUTPATIENT
Start: 2024-06-26

## 2024-06-26 RX ORDER — ACETAMINOPHEN 325 MG/1
650 TABLET ORAL EVERY 4 HOURS PRN
Status: DISCONTINUED | OUTPATIENT
Start: 2024-06-26 | End: 2024-07-03 | Stop reason: HOSPADM

## 2024-06-26 RX ORDER — METOPROLOL TARTRATE 100 MG/1
100 TABLET ORAL 2 TIMES DAILY
Qty: 60 TABLET | Refills: 3 | Status: CANCELLED | OUTPATIENT
Start: 2024-06-26

## 2024-06-26 RX ORDER — DIVALPROEX SODIUM 250 MG/1
250 TABLET, DELAYED RELEASE ORAL 2 TIMES DAILY
Qty: 90 TABLET | Refills: 1 | Status: CANCELLED | OUTPATIENT
Start: 2024-06-26

## 2024-06-26 RX ORDER — BUTALBITAL, ACETAMINOPHEN AND CAFFEINE 300; 40; 50 MG/1; MG/1; MG/1
1 CAPSULE ORAL EVERY 6 HOURS PRN
Qty: 20 CAPSULE | Refills: 0 | Status: CANCELLED | OUTPATIENT
Start: 2024-06-26 | End: 2024-07-03

## 2024-06-26 RX ORDER — LACTOBACILLUS RHAMNOSUS GG 10B CELL
1 CAPSULE ORAL
Qty: 30 CAPSULE | Refills: 0 | Status: CANCELLED | OUTPATIENT
Start: 2024-06-26 | End: 2024-07-26

## 2024-06-26 RX ORDER — ASPIRIN 81 MG/1
81 TABLET ORAL DAILY
Qty: 30 TABLET | Refills: 3 | Status: CANCELLED | OUTPATIENT
Start: 2024-06-26

## 2024-06-26 RX ORDER — LISINOPRIL 20 MG/1
20 TABLET ORAL DAILY
Qty: 30 TABLET | Refills: 3 | Status: CANCELLED | OUTPATIENT
Start: 2024-06-26

## 2024-06-26 RX ORDER — NIFEDIPINE 30 MG
30 TABLET, EXTENDED RELEASE ORAL NIGHTLY
Qty: 30 TABLET | Refills: 3 | Status: CANCELLED | OUTPATIENT
Start: 2024-06-26

## 2024-06-26 RX ORDER — LANOLIN ALCOHOL/MO/W.PET/CERES
400 CREAM (GRAM) TOPICAL 2 TIMES DAILY
Qty: 30 TABLET | Refills: 1 | Status: CANCELLED | OUTPATIENT
Start: 2024-06-26

## 2024-06-26 RX ORDER — POLYETHYLENE GLYCOL 3350 17 G/17G
17 POWDER, FOR SOLUTION ORAL DAILY
Qty: 527 G | Refills: 1 | Status: CANCELLED | OUTPATIENT
Start: 2024-06-26 | End: 2024-08-27

## 2024-06-26 RX ADMIN — SPIRONOLACTONE 25 MG: 25 TABLET ORAL at 08:16

## 2024-06-26 RX ADMIN — BUSPIRONE HYDROCHLORIDE 10 MG: 5 TABLET ORAL at 20:06

## 2024-06-26 RX ADMIN — Medication 400 MG: at 08:16

## 2024-06-26 RX ADMIN — POLYETHYLENE GLYCOL 3350 17 G: 17 POWDER, FOR SOLUTION ORAL at 08:16

## 2024-06-26 RX ADMIN — NIFEDIPINE 30 MG: 30 TABLET, EXTENDED RELEASE ORAL at 20:09

## 2024-06-26 RX ADMIN — ATORVASTATIN CALCIUM 40 MG: 40 TABLET, FILM COATED ORAL at 20:06

## 2024-06-26 RX ADMIN — INSULIN GLARGINE 28 UNITS: 100 INJECTION, SOLUTION SUBCUTANEOUS at 08:15

## 2024-06-26 RX ADMIN — ENOXAPARIN SODIUM 40 MG: 100 INJECTION SUBCUTANEOUS at 08:15

## 2024-06-26 RX ADMIN — ASPIRIN 81 MG: 81 TABLET, COATED ORAL at 08:16

## 2024-06-26 RX ADMIN — Medication 400 MG: at 20:06

## 2024-06-26 RX ADMIN — ANTI-FUNGAL POWDER MICONAZOLE NITRATE TALC FREE: 1.42 POWDER TOPICAL at 08:16

## 2024-06-26 RX ADMIN — DIVALPROEX SODIUM 250 MG: 250 TABLET, DELAYED RELEASE ORAL at 08:15

## 2024-06-26 RX ADMIN — HYDRALAZINE HYDROCHLORIDE 50 MG: 50 TABLET ORAL at 20:06

## 2024-06-26 RX ADMIN — DIVALPROEX SODIUM 250 MG: 250 TABLET, DELAYED RELEASE ORAL at 20:09

## 2024-06-26 RX ADMIN — CEFTRIAXONE SODIUM 2000 MG: 2 INJECTION, POWDER, FOR SOLUTION INTRAMUSCULAR; INTRAVENOUS at 06:02

## 2024-06-26 RX ADMIN — BUTALBITA,ACETAMINOPHEN AND CAFFEINE 1 CAPSULE: 50; 300; 40 CAPSULE ORAL at 14:17

## 2024-06-26 RX ADMIN — BUSPIRONE HYDROCHLORIDE 10 MG: 5 TABLET ORAL at 08:16

## 2024-06-26 RX ADMIN — Medication 1 CAPSULE: at 08:16

## 2024-06-26 RX ADMIN — PANTOPRAZOLE SODIUM 40 MG: 40 TABLET, DELAYED RELEASE ORAL at 06:04

## 2024-06-26 RX ADMIN — VENLAFAXINE HYDROCHLORIDE 75 MG: 75 CAPSULE, EXTENDED RELEASE ORAL at 08:16

## 2024-06-26 RX ADMIN — ROPINIROLE HYDROCHLORIDE 2 MG: 1 TABLET, FILM COATED ORAL at 20:06

## 2024-06-26 RX ADMIN — METOPROLOL 100 MG: 100 TABLET ORAL at 20:06

## 2024-06-26 RX ADMIN — METOPROLOL 100 MG: 100 TABLET ORAL at 08:16

## 2024-06-26 RX ADMIN — HYDRALAZINE HYDROCHLORIDE 50 MG: 50 TABLET ORAL at 14:17

## 2024-06-26 RX ADMIN — ACETAMINOPHEN 650 MG: 325 TABLET ORAL at 23:55

## 2024-06-26 RX ADMIN — ANTI-FUNGAL POWDER MICONAZOLE NITRATE TALC FREE: 1.42 POWDER TOPICAL at 20:09

## 2024-06-26 RX ADMIN — HYDRALAZINE HYDROCHLORIDE 50 MG: 50 TABLET ORAL at 06:04

## 2024-06-26 RX ADMIN — DICLOFENAC SODIUM 2 G: 10 GEL TOPICAL at 11:35

## 2024-06-26 RX ADMIN — LISINOPRIL 20 MG: 20 TABLET ORAL at 08:16

## 2024-06-26 RX ADMIN — SODIUM CHLORIDE, PRESERVATIVE FREE 10 ML: 5 INJECTION INTRAVENOUS at 20:10

## 2024-06-26 RX ADMIN — LATANOPROST 1 DROP: 50 SOLUTION OPHTHALMIC at 20:09

## 2024-06-26 RX ADMIN — SODIUM CHLORIDE, PRESERVATIVE FREE 10 ML: 5 INJECTION INTRAVENOUS at 08:16

## 2024-06-26 RX ADMIN — BUSPIRONE HYDROCHLORIDE 10 MG: 5 TABLET ORAL at 14:17

## 2024-06-26 ASSESSMENT — PAIN SCALES - GENERAL
PAINLEVEL_OUTOF10: 0
PAINLEVEL_OUTOF10: 5
PAINLEVEL_OUTOF10: 0
PAINLEVEL_OUTOF10: 1
PAINLEVEL_OUTOF10: 0
PAINLEVEL_OUTOF10: 0
PAINLEVEL_OUTOF10: 2
PAINLEVEL_OUTOF10: 1

## 2024-06-26 ASSESSMENT — PAIN DESCRIPTION - PAIN TYPE
TYPE: ACUTE PAIN

## 2024-06-26 ASSESSMENT — PAIN SCALES - WONG BAKER
WONGBAKER_NUMERICALRESPONSE: HURTS A LITTLE BIT

## 2024-06-26 ASSESSMENT — PAIN DESCRIPTION - FREQUENCY
FREQUENCY: INTERMITTENT
FREQUENCY: INTERMITTENT

## 2024-06-26 ASSESSMENT — PAIN - FUNCTIONAL ASSESSMENT
PAIN_FUNCTIONAL_ASSESSMENT: ACTIVITIES ARE NOT PREVENTED

## 2024-06-26 ASSESSMENT — PAIN DESCRIPTION - LOCATION
LOCATION: HEAD
LOCATION: HEAD
LOCATION: NECK
LOCATION: BACK

## 2024-06-26 ASSESSMENT — PAIN DESCRIPTION - ORIENTATION
ORIENTATION: POSTERIOR
ORIENTATION: ANTERIOR;POSTERIOR
ORIENTATION: MID

## 2024-06-26 ASSESSMENT — PAIN DESCRIPTION - DESCRIPTORS
DESCRIPTORS: ACHING
DESCRIPTORS: ACHING;DISCOMFORT
DESCRIPTORS: ACHING

## 2024-06-26 NOTE — CARE COORDINATION
ARU CASE MANAGEMENT NOTE:    Admission Date:  6/14/2024 Kavya De Santiago is a 64 y.o.  female    Admitted for : Meningitis [G03.9]  Encephalopathy [G93.40]    LEELA ORTIZ verified with Indigo Hernandez (public benefits) that the patient is still not showing active medicaid benefits. Will discuss with patient and continue to follow. Indigo will continue to check regularly for updates.    Will continue to follow for additional discharge needs.    Electronically signed by Terrie Johnson RN on 6/26/2024 at 9:15 AM

## 2024-06-27 PROCEDURE — 1180000000 HC REHAB R&B

## 2024-06-27 PROCEDURE — 6370000000 HC RX 637 (ALT 250 FOR IP): Performed by: INTERNAL MEDICINE

## 2024-06-27 PROCEDURE — 97530 THERAPEUTIC ACTIVITIES: CPT

## 2024-06-27 PROCEDURE — 97130 THER IVNTJ EA ADDL 15 MIN: CPT

## 2024-06-27 PROCEDURE — 99231 SBSQ HOSP IP/OBS SF/LOW 25: CPT | Performed by: INTERNAL MEDICINE

## 2024-06-27 PROCEDURE — 97110 THERAPEUTIC EXERCISES: CPT

## 2024-06-27 PROCEDURE — 97116 GAIT TRAINING THERAPY: CPT

## 2024-06-27 PROCEDURE — 6370000000 HC RX 637 (ALT 250 FOR IP): Performed by: PHYSICAL MEDICINE & REHABILITATION

## 2024-06-27 PROCEDURE — 99232 SBSQ HOSP IP/OBS MODERATE 35: CPT | Performed by: INTERNAL MEDICINE

## 2024-06-27 PROCEDURE — 97535 SELF CARE MNGMENT TRAINING: CPT

## 2024-06-27 PROCEDURE — 6360000002 HC RX W HCPCS: Performed by: STUDENT IN AN ORGANIZED HEALTH CARE EDUCATION/TRAINING PROGRAM

## 2024-06-27 PROCEDURE — 97129 THER IVNTJ 1ST 15 MIN: CPT

## 2024-06-27 PROCEDURE — 99232 SBSQ HOSP IP/OBS MODERATE 35: CPT | Performed by: PHYSICAL MEDICINE & REHABILITATION

## 2024-06-27 PROCEDURE — 2580000003 HC RX 258: Performed by: STUDENT IN AN ORGANIZED HEALTH CARE EDUCATION/TRAINING PROGRAM

## 2024-06-27 PROCEDURE — 6370000000 HC RX 637 (ALT 250 FOR IP): Performed by: PSYCHIATRY & NEUROLOGY

## 2024-06-27 PROCEDURE — 2580000003 HC RX 258: Performed by: NURSE PRACTITIONER

## 2024-06-27 PROCEDURE — 6360000002 HC RX W HCPCS: Performed by: PHYSICAL MEDICINE & REHABILITATION

## 2024-06-27 RX ORDER — INSULIN GLARGINE 100 [IU]/ML
30 INJECTION, SOLUTION SUBCUTANEOUS DAILY
Status: DISCONTINUED | OUTPATIENT
Start: 2024-06-28 | End: 2024-07-03 | Stop reason: HOSPADM

## 2024-06-27 RX ORDER — INSULIN GLARGINE 100 [IU]/ML
28 INJECTION, SOLUTION SUBCUTANEOUS DAILY
Qty: 10 ML | Refills: 3 | Status: CANCELLED | OUTPATIENT
Start: 2024-06-27

## 2024-06-27 RX ADMIN — ASPIRIN 81 MG: 81 TABLET, COATED ORAL at 09:34

## 2024-06-27 RX ADMIN — ROPINIROLE HYDROCHLORIDE 2 MG: 1 TABLET, FILM COATED ORAL at 20:09

## 2024-06-27 RX ADMIN — Medication 1 CAPSULE: at 09:31

## 2024-06-27 RX ADMIN — DIVALPROEX SODIUM 250 MG: 250 TABLET, DELAYED RELEASE ORAL at 09:33

## 2024-06-27 RX ADMIN — VENLAFAXINE HYDROCHLORIDE 75 MG: 75 CAPSULE, EXTENDED RELEASE ORAL at 09:31

## 2024-06-27 RX ADMIN — SPIRONOLACTONE 25 MG: 25 TABLET ORAL at 09:31

## 2024-06-27 RX ADMIN — ANTI-FUNGAL POWDER MICONAZOLE NITRATE TALC FREE: 1.42 POWDER TOPICAL at 20:09

## 2024-06-27 RX ADMIN — BUTALBITA,ACETAMINOPHEN AND CAFFEINE 1 CAPSULE: 50; 300; 40 CAPSULE ORAL at 16:08

## 2024-06-27 RX ADMIN — ATORVASTATIN CALCIUM 40 MG: 40 TABLET, FILM COATED ORAL at 20:10

## 2024-06-27 RX ADMIN — SODIUM CHLORIDE, PRESERVATIVE FREE 10 ML: 5 INJECTION INTRAVENOUS at 09:35

## 2024-06-27 RX ADMIN — BUSPIRONE HYDROCHLORIDE 10 MG: 5 TABLET ORAL at 20:10

## 2024-06-27 RX ADMIN — CEFTRIAXONE SODIUM 2000 MG: 2 INJECTION, POWDER, FOR SOLUTION INTRAMUSCULAR; INTRAVENOUS at 05:46

## 2024-06-27 RX ADMIN — INSULIN LISPRO 2 UNITS: 100 INJECTION, SOLUTION INTRAVENOUS; SUBCUTANEOUS at 11:25

## 2024-06-27 RX ADMIN — DICLOFENAC SODIUM 2 G: 10 GEL TOPICAL at 16:07

## 2024-06-27 RX ADMIN — PANTOPRAZOLE SODIUM 40 MG: 40 TABLET, DELAYED RELEASE ORAL at 05:44

## 2024-06-27 RX ADMIN — SODIUM CHLORIDE, PRESERVATIVE FREE 10 ML: 5 INJECTION INTRAVENOUS at 20:12

## 2024-06-27 RX ADMIN — Medication 400 MG: at 20:10

## 2024-06-27 RX ADMIN — DIVALPROEX SODIUM 250 MG: 250 TABLET, DELAYED RELEASE ORAL at 20:09

## 2024-06-27 RX ADMIN — METOPROLOL 100 MG: 100 TABLET ORAL at 20:10

## 2024-06-27 RX ADMIN — NIFEDIPINE 30 MG: 30 TABLET, EXTENDED RELEASE ORAL at 20:10

## 2024-06-27 RX ADMIN — BUSPIRONE HYDROCHLORIDE 10 MG: 5 TABLET ORAL at 09:31

## 2024-06-27 RX ADMIN — HYDRALAZINE HYDROCHLORIDE 50 MG: 50 TABLET ORAL at 20:10

## 2024-06-27 RX ADMIN — INSULIN GLARGINE 28 UNITS: 100 INJECTION, SOLUTION SUBCUTANEOUS at 09:32

## 2024-06-27 RX ADMIN — HYDRALAZINE HYDROCHLORIDE 50 MG: 50 TABLET ORAL at 05:44

## 2024-06-27 RX ADMIN — POLYETHYLENE GLYCOL 3350 17 G: 17 POWDER, FOR SOLUTION ORAL at 09:32

## 2024-06-27 RX ADMIN — ENOXAPARIN SODIUM 40 MG: 100 INJECTION SUBCUTANEOUS at 09:33

## 2024-06-27 RX ADMIN — LATANOPROST 1 DROP: 50 SOLUTION OPHTHALMIC at 20:09

## 2024-06-27 RX ADMIN — HYDRALAZINE HYDROCHLORIDE 50 MG: 50 TABLET ORAL at 15:02

## 2024-06-27 RX ADMIN — BUSPIRONE HYDROCHLORIDE 10 MG: 5 TABLET ORAL at 15:02

## 2024-06-27 RX ADMIN — LISINOPRIL 20 MG: 20 TABLET ORAL at 09:32

## 2024-06-27 RX ADMIN — ANTI-FUNGAL POWDER MICONAZOLE NITRATE TALC FREE: 1.42 POWDER TOPICAL at 09:35

## 2024-06-27 RX ADMIN — INSULIN LISPRO 4 UNITS: 100 INJECTION, SOLUTION INTRAVENOUS; SUBCUTANEOUS at 16:47

## 2024-06-27 RX ADMIN — Medication 400 MG: at 09:31

## 2024-06-27 RX ADMIN — METOPROLOL 100 MG: 100 TABLET ORAL at 09:31

## 2024-06-27 ASSESSMENT — PAIN SCALES - GENERAL
PAINLEVEL_OUTOF10: 3
PAINLEVEL_OUTOF10: 0
PAINLEVEL_OUTOF10: 3

## 2024-06-27 ASSESSMENT — PAIN DESCRIPTION - LOCATION: LOCATION: NECK

## 2024-06-27 ASSESSMENT — PAIN - FUNCTIONAL ASSESSMENT: PAIN_FUNCTIONAL_ASSESSMENT: ACTIVITIES ARE NOT PREVENTED

## 2024-06-27 ASSESSMENT — PAIN DESCRIPTION - DESCRIPTORS: DESCRIPTORS: ACHING;DISCOMFORT

## 2024-06-27 ASSESSMENT — PAIN DESCRIPTION - ORIENTATION: ORIENTATION: POSTERIOR

## 2024-06-28 PROCEDURE — 2580000003 HC RX 258: Performed by: STUDENT IN AN ORGANIZED HEALTH CARE EDUCATION/TRAINING PROGRAM

## 2024-06-28 PROCEDURE — 6370000000 HC RX 637 (ALT 250 FOR IP): Performed by: PHYSICAL MEDICINE & REHABILITATION

## 2024-06-28 PROCEDURE — 6360000002 HC RX W HCPCS: Performed by: PHYSICAL MEDICINE & REHABILITATION

## 2024-06-28 PROCEDURE — 6370000000 HC RX 637 (ALT 250 FOR IP): Performed by: INTERNAL MEDICINE

## 2024-06-28 PROCEDURE — 97129 THER IVNTJ 1ST 15 MIN: CPT

## 2024-06-28 PROCEDURE — 97110 THERAPEUTIC EXERCISES: CPT

## 2024-06-28 PROCEDURE — 97535 SELF CARE MNGMENT TRAINING: CPT

## 2024-06-28 PROCEDURE — 6370000000 HC RX 637 (ALT 250 FOR IP): Performed by: PSYCHIATRY & NEUROLOGY

## 2024-06-28 PROCEDURE — 97530 THERAPEUTIC ACTIVITIES: CPT

## 2024-06-28 PROCEDURE — 1180000000 HC REHAB R&B

## 2024-06-28 PROCEDURE — 97116 GAIT TRAINING THERAPY: CPT

## 2024-06-28 PROCEDURE — 99232 SBSQ HOSP IP/OBS MODERATE 35: CPT | Performed by: INTERNAL MEDICINE

## 2024-06-28 PROCEDURE — 6360000002 HC RX W HCPCS: Performed by: STUDENT IN AN ORGANIZED HEALTH CARE EDUCATION/TRAINING PROGRAM

## 2024-06-28 PROCEDURE — 2580000003 HC RX 258: Performed by: NURSE PRACTITIONER

## 2024-06-28 PROCEDURE — 99232 SBSQ HOSP IP/OBS MODERATE 35: CPT | Performed by: PHYSICAL MEDICINE & REHABILITATION

## 2024-06-28 PROCEDURE — 97130 THER IVNTJ EA ADDL 15 MIN: CPT

## 2024-06-28 RX ADMIN — ANTI-FUNGAL POWDER MICONAZOLE NITRATE TALC FREE: 1.42 POWDER TOPICAL at 08:04

## 2024-06-28 RX ADMIN — HYDRALAZINE HYDROCHLORIDE 50 MG: 50 TABLET ORAL at 20:08

## 2024-06-28 RX ADMIN — SPIRONOLACTONE 25 MG: 25 TABLET ORAL at 07:51

## 2024-06-28 RX ADMIN — LISINOPRIL 20 MG: 20 TABLET ORAL at 07:51

## 2024-06-28 RX ADMIN — ASPIRIN 81 MG: 81 TABLET, COATED ORAL at 07:51

## 2024-06-28 RX ADMIN — CEFTRIAXONE SODIUM 2000 MG: 2 INJECTION, POWDER, FOR SOLUTION INTRAMUSCULAR; INTRAVENOUS at 05:31

## 2024-06-28 RX ADMIN — BUSPIRONE HYDROCHLORIDE 10 MG: 5 TABLET ORAL at 14:52

## 2024-06-28 RX ADMIN — BUSPIRONE HYDROCHLORIDE 10 MG: 5 TABLET ORAL at 07:50

## 2024-06-28 RX ADMIN — ROPINIROLE HYDROCHLORIDE 2 MG: 1 TABLET, FILM COATED ORAL at 19:37

## 2024-06-28 RX ADMIN — DICLOFENAC SODIUM 2 G: 10 GEL TOPICAL at 19:41

## 2024-06-28 RX ADMIN — PANTOPRAZOLE SODIUM 40 MG: 40 TABLET, DELAYED RELEASE ORAL at 05:30

## 2024-06-28 RX ADMIN — SODIUM CHLORIDE, PRESERVATIVE FREE 10 ML: 5 INJECTION INTRAVENOUS at 08:06

## 2024-06-28 RX ADMIN — DIVALPROEX SODIUM 250 MG: 250 TABLET, DELAYED RELEASE ORAL at 09:33

## 2024-06-28 RX ADMIN — HYDRALAZINE HYDROCHLORIDE 50 MG: 50 TABLET ORAL at 14:52

## 2024-06-28 RX ADMIN — METOPROLOL 100 MG: 100 TABLET ORAL at 19:39

## 2024-06-28 RX ADMIN — ATORVASTATIN CALCIUM 40 MG: 40 TABLET, FILM COATED ORAL at 19:37

## 2024-06-28 RX ADMIN — HYDRALAZINE HYDROCHLORIDE 50 MG: 50 TABLET ORAL at 05:30

## 2024-06-28 RX ADMIN — VENLAFAXINE HYDROCHLORIDE 75 MG: 75 CAPSULE, EXTENDED RELEASE ORAL at 07:51

## 2024-06-28 RX ADMIN — Medication 400 MG: at 19:37

## 2024-06-28 RX ADMIN — INSULIN LISPRO 2 UNITS: 100 INJECTION, SOLUTION INTRAVENOUS; SUBCUTANEOUS at 12:59

## 2024-06-28 RX ADMIN — DIVALPROEX SODIUM 250 MG: 250 TABLET, DELAYED RELEASE ORAL at 19:37

## 2024-06-28 RX ADMIN — ANTI-FUNGAL POWDER MICONAZOLE NITRATE TALC FREE: 1.42 POWDER TOPICAL at 19:36

## 2024-06-28 RX ADMIN — BUSPIRONE HYDROCHLORIDE 10 MG: 5 TABLET ORAL at 19:37

## 2024-06-28 RX ADMIN — Medication 1 CAPSULE: at 07:51

## 2024-06-28 RX ADMIN — LATANOPROST 1 DROP: 50 SOLUTION OPHTHALMIC at 19:36

## 2024-06-28 RX ADMIN — INSULIN GLARGINE 30 UNITS: 100 INJECTION, SOLUTION SUBCUTANEOUS at 09:33

## 2024-06-28 RX ADMIN — METOPROLOL 100 MG: 100 TABLET ORAL at 07:51

## 2024-06-28 RX ADMIN — SODIUM CHLORIDE, PRESERVATIVE FREE 10 ML: 5 INJECTION INTRAVENOUS at 19:36

## 2024-06-28 RX ADMIN — Medication 400 MG: at 07:51

## 2024-06-28 RX ADMIN — INSULIN LISPRO 4 UNITS: 100 INJECTION, SOLUTION INTRAVENOUS; SUBCUTANEOUS at 16:45

## 2024-06-28 RX ADMIN — ENOXAPARIN SODIUM 40 MG: 100 INJECTION SUBCUTANEOUS at 07:50

## 2024-06-28 RX ADMIN — NIFEDIPINE 30 MG: 30 TABLET, EXTENDED RELEASE ORAL at 19:37

## 2024-06-28 ASSESSMENT — PAIN SCALES - GENERAL
PAINLEVEL_OUTOF10: 2
PAINLEVEL_OUTOF10: 3
PAINLEVEL_OUTOF10: 2
PAINLEVEL_OUTOF10: 0

## 2024-06-28 ASSESSMENT — PAIN DESCRIPTION - FREQUENCY: FREQUENCY: CONTINUOUS

## 2024-06-28 ASSESSMENT — PAIN DESCRIPTION - LOCATION
LOCATION: NECK
LOCATION: HEAD
LOCATION: HEAD

## 2024-06-28 ASSESSMENT — PAIN DESCRIPTION - ORIENTATION
ORIENTATION: POSTERIOR
ORIENTATION: LEFT

## 2024-06-28 ASSESSMENT — PAIN DESCRIPTION - PAIN TYPE: TYPE: ACUTE PAIN

## 2024-06-28 ASSESSMENT — PAIN DESCRIPTION - DESCRIPTORS
DESCRIPTORS: ACHING;DISCOMFORT
DESCRIPTORS: ACHING

## 2024-06-28 ASSESSMENT — PAIN DESCRIPTION - ONSET: ONSET: ON-GOING

## 2024-06-29 LAB
ANION GAP SERPL CALCULATED.3IONS-SCNC: 7 MMOL/L (ref 9–17)
BASOPHILS # BLD: 0 K/UL (ref 0–0.2)
BASOPHILS NFR BLD: 1 % (ref 0–2)
BUN SERPL-MCNC: 12 MG/DL (ref 8–23)
CALCIUM SERPL-MCNC: 8.5 MG/DL (ref 8.6–10.4)
CHLORIDE SERPL-SCNC: 104 MMOL/L (ref 98–107)
CO2 SERPL-SCNC: 30 MMOL/L (ref 20–31)
CREAT SERPL-MCNC: 0.6 MG/DL (ref 0.5–0.9)
EOSINOPHIL # BLD: 0.4 K/UL (ref 0–0.4)
EOSINOPHILS RELATIVE PERCENT: 6 % (ref 0–4)
ERYTHROCYTE [DISTWIDTH] IN BLOOD BY AUTOMATED COUNT: 16.8 % (ref 11.5–14.9)
GFR, ESTIMATED: >90 ML/MIN/1.73M2
GLUCOSE SERPL-MCNC: 178 MG/DL (ref 70–99)
HCT VFR BLD AUTO: 28.8 % (ref 36–46)
HGB BLD-MCNC: 9 G/DL (ref 12–16)
LYMPHOCYTES NFR BLD: 1.9 K/UL (ref 1–4.8)
LYMPHOCYTES RELATIVE PERCENT: 27 % (ref 24–44)
MAGNESIUM SERPL-MCNC: 1.5 MG/DL (ref 1.6–2.6)
MCH RBC QN AUTO: 27.6 PG (ref 26–34)
MCHC RBC AUTO-ENTMCNC: 31.4 G/DL (ref 31–37)
MCV RBC AUTO: 87.9 FL (ref 80–100)
MONOCYTES NFR BLD: 0.7 K/UL (ref 0.1–1.3)
MONOCYTES NFR BLD: 11 % (ref 1–7)
NEUTROPHILS NFR BLD: 55 % (ref 36–66)
NEUTS SEG NFR BLD: 3.9 K/UL (ref 1.3–9.1)
PLATELET # BLD AUTO: 175 K/UL (ref 150–450)
PMV BLD AUTO: 8.5 FL (ref 6–12)
POTASSIUM SERPL-SCNC: 3.7 MMOL/L (ref 3.7–5.3)
RBC # BLD AUTO: 3.28 M/UL (ref 4–5.2)
SODIUM SERPL-SCNC: 141 MMOL/L (ref 135–144)
WBC OTHER # BLD: 7 K/UL (ref 3.5–11)

## 2024-06-29 PROCEDURE — 97116 GAIT TRAINING THERAPY: CPT

## 2024-06-29 PROCEDURE — 6360000002 HC RX W HCPCS: Performed by: STUDENT IN AN ORGANIZED HEALTH CARE EDUCATION/TRAINING PROGRAM

## 2024-06-29 PROCEDURE — 6370000000 HC RX 637 (ALT 250 FOR IP): Performed by: INTERNAL MEDICINE

## 2024-06-29 PROCEDURE — 83735 ASSAY OF MAGNESIUM: CPT

## 2024-06-29 PROCEDURE — 80048 BASIC METABOLIC PNL TOTAL CA: CPT

## 2024-06-29 PROCEDURE — 99232 SBSQ HOSP IP/OBS MODERATE 35: CPT | Performed by: INTERNAL MEDICINE

## 2024-06-29 PROCEDURE — 1180000000 HC REHAB R&B

## 2024-06-29 PROCEDURE — 2580000003 HC RX 258: Performed by: NURSE PRACTITIONER

## 2024-06-29 PROCEDURE — 36415 COLL VENOUS BLD VENIPUNCTURE: CPT

## 2024-06-29 PROCEDURE — 6370000000 HC RX 637 (ALT 250 FOR IP): Performed by: PSYCHIATRY & NEUROLOGY

## 2024-06-29 PROCEDURE — 99232 SBSQ HOSP IP/OBS MODERATE 35: CPT | Performed by: PHYSICAL MEDICINE & REHABILITATION

## 2024-06-29 PROCEDURE — 97110 THERAPEUTIC EXERCISES: CPT

## 2024-06-29 PROCEDURE — 2580000003 HC RX 258: Performed by: STUDENT IN AN ORGANIZED HEALTH CARE EDUCATION/TRAINING PROGRAM

## 2024-06-29 PROCEDURE — 6370000000 HC RX 637 (ALT 250 FOR IP): Performed by: PHYSICAL MEDICINE & REHABILITATION

## 2024-06-29 PROCEDURE — 6370000000 HC RX 637 (ALT 250 FOR IP): Performed by: NURSE PRACTITIONER

## 2024-06-29 PROCEDURE — 85025 COMPLETE CBC W/AUTO DIFF WBC: CPT

## 2024-06-29 PROCEDURE — 6360000002 HC RX W HCPCS: Performed by: PHYSICAL MEDICINE & REHABILITATION

## 2024-06-29 PROCEDURE — 97530 THERAPEUTIC ACTIVITIES: CPT

## 2024-06-29 RX ADMIN — BUSPIRONE HYDROCHLORIDE 10 MG: 5 TABLET ORAL at 20:18

## 2024-06-29 RX ADMIN — NIFEDIPINE 30 MG: 30 TABLET, EXTENDED RELEASE ORAL at 20:18

## 2024-06-29 RX ADMIN — ROPINIROLE HYDROCHLORIDE 2 MG: 1 TABLET, FILM COATED ORAL at 20:18

## 2024-06-29 RX ADMIN — ANTI-FUNGAL POWDER MICONAZOLE NITRATE TALC FREE: 1.42 POWDER TOPICAL at 20:16

## 2024-06-29 RX ADMIN — SODIUM CHLORIDE, PRESERVATIVE FREE 10 ML: 5 INJECTION INTRAVENOUS at 08:23

## 2024-06-29 RX ADMIN — ACETAMINOPHEN 650 MG: 325 TABLET ORAL at 00:27

## 2024-06-29 RX ADMIN — Medication 400 MG: at 20:18

## 2024-06-29 RX ADMIN — LISINOPRIL 20 MG: 20 TABLET ORAL at 08:20

## 2024-06-29 RX ADMIN — HYDRALAZINE HYDROCHLORIDE 50 MG: 50 TABLET ORAL at 05:39

## 2024-06-29 RX ADMIN — ATORVASTATIN CALCIUM 40 MG: 40 TABLET, FILM COATED ORAL at 20:18

## 2024-06-29 RX ADMIN — ANTI-FUNGAL POWDER MICONAZOLE NITRATE TALC FREE: 1.42 POWDER TOPICAL at 08:22

## 2024-06-29 RX ADMIN — SPIRONOLACTONE 25 MG: 25 TABLET ORAL at 08:20

## 2024-06-29 RX ADMIN — BUSPIRONE HYDROCHLORIDE 10 MG: 5 TABLET ORAL at 15:04

## 2024-06-29 RX ADMIN — PANTOPRAZOLE SODIUM 40 MG: 40 TABLET, DELAYED RELEASE ORAL at 05:39

## 2024-06-29 RX ADMIN — CEFTRIAXONE SODIUM 2000 MG: 2 INJECTION, POWDER, FOR SOLUTION INTRAMUSCULAR; INTRAVENOUS at 05:39

## 2024-06-29 RX ADMIN — ACETAMINOPHEN 650 MG: 325 TABLET ORAL at 23:33

## 2024-06-29 RX ADMIN — HYDRALAZINE HYDROCHLORIDE 50 MG: 50 TABLET ORAL at 15:04

## 2024-06-29 RX ADMIN — SODIUM CHLORIDE, PRESERVATIVE FREE 10 ML: 5 INJECTION INTRAVENOUS at 20:17

## 2024-06-29 RX ADMIN — HYDRALAZINE HYDROCHLORIDE 50 MG: 50 TABLET ORAL at 20:18

## 2024-06-29 RX ADMIN — ENOXAPARIN SODIUM 40 MG: 100 INJECTION SUBCUTANEOUS at 08:21

## 2024-06-29 RX ADMIN — VENLAFAXINE HYDROCHLORIDE 75 MG: 75 CAPSULE, EXTENDED RELEASE ORAL at 08:20

## 2024-06-29 RX ADMIN — BUSPIRONE HYDROCHLORIDE 10 MG: 5 TABLET ORAL at 08:20

## 2024-06-29 RX ADMIN — ASPIRIN 81 MG: 81 TABLET, COATED ORAL at 08:20

## 2024-06-29 RX ADMIN — METOPROLOL 100 MG: 100 TABLET ORAL at 08:20

## 2024-06-29 RX ADMIN — METOPROLOL 100 MG: 100 TABLET ORAL at 20:18

## 2024-06-29 RX ADMIN — LATANOPROST 1 DROP: 50 SOLUTION OPHTHALMIC at 20:17

## 2024-06-29 RX ADMIN — DIVALPROEX SODIUM 250 MG: 250 TABLET, DELAYED RELEASE ORAL at 20:18

## 2024-06-29 RX ADMIN — ACETAMINOPHEN 650 MG: 325 TABLET ORAL at 15:21

## 2024-06-29 RX ADMIN — Medication 1 CAPSULE: at 08:20

## 2024-06-29 RX ADMIN — Medication 400 MG: at 08:20

## 2024-06-29 RX ADMIN — DICLOFENAC SODIUM 2 G: 10 GEL TOPICAL at 20:16

## 2024-06-29 RX ADMIN — DIVALPROEX SODIUM 250 MG: 250 TABLET, DELAYED RELEASE ORAL at 08:21

## 2024-06-29 RX ADMIN — INSULIN GLARGINE 30 UNITS: 100 INJECTION, SOLUTION SUBCUTANEOUS at 08:20

## 2024-06-29 ASSESSMENT — PAIN DESCRIPTION - LOCATION
LOCATION: NECK
LOCATION: HEAD
LOCATION: NECK
LOCATION: HEAD;NECK

## 2024-06-29 ASSESSMENT — PAIN - FUNCTIONAL ASSESSMENT
PAIN_FUNCTIONAL_ASSESSMENT: ACTIVITIES ARE NOT PREVENTED

## 2024-06-29 ASSESSMENT — PAIN DESCRIPTION - FREQUENCY: FREQUENCY: INTERMITTENT

## 2024-06-29 ASSESSMENT — PAIN DESCRIPTION - ORIENTATION
ORIENTATION: MID
ORIENTATION: POSTERIOR
ORIENTATION: POSTERIOR

## 2024-06-29 ASSESSMENT — PAIN DESCRIPTION - DESCRIPTORS
DESCRIPTORS: ACHING;SHARP
DESCRIPTORS: SHARP
DESCRIPTORS: ACHING
DESCRIPTORS: ACHING

## 2024-06-29 ASSESSMENT — PAIN SCALES - WONG BAKER: WONGBAKER_NUMERICALRESPONSE: HURTS A LITTLE BIT

## 2024-06-29 ASSESSMENT — PAIN SCALES - GENERAL
PAINLEVEL_OUTOF10: 4
PAINLEVEL_OUTOF10: 5
PAINLEVEL_OUTOF10: 0
PAINLEVEL_OUTOF10: 3
PAINLEVEL_OUTOF10: 4
PAINLEVEL_OUTOF10: 4
PAINLEVEL_OUTOF10: 0
PAINLEVEL_OUTOF10: 1

## 2024-06-29 ASSESSMENT — PAIN DESCRIPTION - ONSET: ONSET: ON-GOING

## 2024-06-29 ASSESSMENT — PAIN DESCRIPTION - PAIN TYPE: TYPE: ACUTE PAIN

## 2024-06-30 LAB
ALBUMIN SERPL-MCNC: 3.7 G/DL (ref 3.5–5.2)
ALP SERPL-CCNC: 95 U/L (ref 35–104)
ALT SERPL-CCNC: 20 U/L (ref 5–33)
AST SERPL-CCNC: 14 U/L
BILIRUB DIRECT SERPL-MCNC: <0.1 MG/DL
BILIRUB INDIRECT SERPL-MCNC: ABNORMAL MG/DL (ref 0–1)
BILIRUB SERPL-MCNC: <0.2 MG/DL (ref 0.3–1.2)
PROT SERPL-MCNC: 6.8 G/DL (ref 6.4–8.3)

## 2024-06-30 PROCEDURE — 97110 THERAPEUTIC EXERCISES: CPT

## 2024-06-30 PROCEDURE — 80076 HEPATIC FUNCTION PANEL: CPT

## 2024-06-30 PROCEDURE — 6370000000 HC RX 637 (ALT 250 FOR IP): Performed by: PHYSICAL MEDICINE & REHABILITATION

## 2024-06-30 PROCEDURE — 97530 THERAPEUTIC ACTIVITIES: CPT

## 2024-06-30 PROCEDURE — 97535 SELF CARE MNGMENT TRAINING: CPT

## 2024-06-30 PROCEDURE — 6370000000 HC RX 637 (ALT 250 FOR IP): Performed by: INTERNAL MEDICINE

## 2024-06-30 PROCEDURE — 97116 GAIT TRAINING THERAPY: CPT

## 2024-06-30 PROCEDURE — 6370000000 HC RX 637 (ALT 250 FOR IP): Performed by: PSYCHIATRY & NEUROLOGY

## 2024-06-30 PROCEDURE — 2580000003 HC RX 258: Performed by: STUDENT IN AN ORGANIZED HEALTH CARE EDUCATION/TRAINING PROGRAM

## 2024-06-30 PROCEDURE — 6360000002 HC RX W HCPCS: Performed by: PHYSICAL MEDICINE & REHABILITATION

## 2024-06-30 PROCEDURE — 1180000000 HC REHAB R&B

## 2024-06-30 PROCEDURE — 99232 SBSQ HOSP IP/OBS MODERATE 35: CPT | Performed by: PHYSICAL MEDICINE & REHABILITATION

## 2024-06-30 PROCEDURE — 6360000002 HC RX W HCPCS: Performed by: STUDENT IN AN ORGANIZED HEALTH CARE EDUCATION/TRAINING PROGRAM

## 2024-06-30 PROCEDURE — 36415 COLL VENOUS BLD VENIPUNCTURE: CPT

## 2024-06-30 PROCEDURE — 6370000000 HC RX 637 (ALT 250 FOR IP): Performed by: NURSE PRACTITIONER

## 2024-06-30 PROCEDURE — 99232 SBSQ HOSP IP/OBS MODERATE 35: CPT | Performed by: INTERNAL MEDICINE

## 2024-06-30 PROCEDURE — 2580000003 HC RX 258: Performed by: NURSE PRACTITIONER

## 2024-06-30 RX ORDER — NIFEDIPINE 30 MG
30 TABLET, EXTENDED RELEASE ORAL NIGHTLY
Qty: 30 TABLET | Refills: 3 | OUTPATIENT
Start: 2024-06-30

## 2024-06-30 RX ORDER — METOPROLOL TARTRATE 100 MG/1
100 TABLET ORAL 2 TIMES DAILY
Qty: 60 TABLET | Refills: 3 | OUTPATIENT
Start: 2024-06-30

## 2024-06-30 RX ORDER — PANTOPRAZOLE SODIUM 40 MG/1
40 TABLET, DELAYED RELEASE ORAL
Qty: 30 TABLET | Refills: 3 | OUTPATIENT
Start: 2024-06-30

## 2024-06-30 RX ORDER — BLOOD-GLUCOSE METER
1 KIT MISCELLANEOUS DAILY
Qty: 1 KIT | Refills: 2 | OUTPATIENT
Start: 2024-06-30

## 2024-06-30 RX ORDER — ASPIRIN 81 MG/1
81 TABLET ORAL DAILY
Qty: 30 TABLET | Refills: 3 | OUTPATIENT
Start: 2024-06-30

## 2024-06-30 RX ORDER — SPIRONOLACTONE 25 MG/1
25 TABLET ORAL 2 TIMES DAILY
Status: DISCONTINUED | OUTPATIENT
Start: 2024-06-30 | End: 2024-07-03 | Stop reason: HOSPADM

## 2024-06-30 RX ORDER — LACTOBACILLUS RHAMNOSUS GG 10B CELL
1 CAPSULE ORAL
Qty: 30 CAPSULE | Refills: 0 | OUTPATIENT
Start: 2024-06-30 | End: 2024-07-30

## 2024-06-30 RX ORDER — LISINOPRIL 20 MG/1
20 TABLET ORAL DAILY
Qty: 30 TABLET | Refills: 3 | OUTPATIENT
Start: 2024-06-30

## 2024-06-30 RX ORDER — LANOLIN ALCOHOL/MO/W.PET/CERES
400 CREAM (GRAM) TOPICAL 2 TIMES DAILY
Qty: 60 TABLET | Refills: 0 | OUTPATIENT
Start: 2024-06-30 | End: 2024-07-30

## 2024-06-30 RX ORDER — LANOLIN ALCOHOL/MO/W.PET/CERES
400 CREAM (GRAM) TOPICAL 3 TIMES DAILY
Status: DISCONTINUED | OUTPATIENT
Start: 2024-06-30 | End: 2024-07-03 | Stop reason: HOSPADM

## 2024-06-30 RX ORDER — SPIRONOLACTONE 25 MG/1
25 TABLET ORAL DAILY
Qty: 30 TABLET | Refills: 3 | OUTPATIENT
Start: 2024-06-30

## 2024-06-30 RX ORDER — LANOLIN ALCOHOL/MO/W.PET/CERES
3 CREAM (GRAM) TOPICAL NIGHTLY PRN
Status: DISCONTINUED | OUTPATIENT
Start: 2024-06-30 | End: 2024-07-03 | Stop reason: HOSPADM

## 2024-06-30 RX ORDER — HYDRALAZINE HYDROCHLORIDE 50 MG/1
50 TABLET, FILM COATED ORAL EVERY 8 HOURS SCHEDULED
Qty: 90 TABLET | Refills: 3 | OUTPATIENT
Start: 2024-06-30

## 2024-06-30 RX ORDER — BUTALBITAL, ACETAMINOPHEN AND CAFFEINE 300; 40; 50 MG/1; MG/1; MG/1
1 CAPSULE ORAL 3 TIMES DAILY PRN
Qty: 30 CAPSULE | Refills: 0 | OUTPATIENT
Start: 2024-06-30 | End: 2024-07-10

## 2024-06-30 RX ORDER — DIVALPROEX SODIUM 250 MG/1
250 TABLET, DELAYED RELEASE ORAL 2 TIMES DAILY
Qty: 90 TABLET | Refills: 1 | OUTPATIENT
Start: 2024-06-30

## 2024-06-30 RX ADMIN — LISINOPRIL 20 MG: 20 TABLET ORAL at 07:46

## 2024-06-30 RX ADMIN — ROPINIROLE HYDROCHLORIDE 2 MG: 1 TABLET, FILM COATED ORAL at 20:18

## 2024-06-30 RX ADMIN — CEFTRIAXONE SODIUM 2000 MG: 2 INJECTION, POWDER, FOR SOLUTION INTRAMUSCULAR; INTRAVENOUS at 05:21

## 2024-06-30 RX ADMIN — ACETAMINOPHEN 650 MG: 325 TABLET ORAL at 03:28

## 2024-06-30 RX ADMIN — METOPROLOL 100 MG: 100 TABLET ORAL at 07:46

## 2024-06-30 RX ADMIN — LATANOPROST 1 DROP: 50 SOLUTION OPHTHALMIC at 20:22

## 2024-06-30 RX ADMIN — BUSPIRONE HYDROCHLORIDE 10 MG: 5 TABLET ORAL at 07:46

## 2024-06-30 RX ADMIN — PANTOPRAZOLE SODIUM 40 MG: 40 TABLET, DELAYED RELEASE ORAL at 05:12

## 2024-06-30 RX ADMIN — ENOXAPARIN SODIUM 40 MG: 100 INJECTION SUBCUTANEOUS at 07:45

## 2024-06-30 RX ADMIN — CEFTRIAXONE SODIUM 2000 MG: 2 INJECTION, POWDER, FOR SOLUTION INTRAMUSCULAR; INTRAVENOUS at 05:44

## 2024-06-30 RX ADMIN — Medication 400 MG: at 07:46

## 2024-06-30 RX ADMIN — DIVALPROEX SODIUM 250 MG: 250 TABLET, DELAYED RELEASE ORAL at 20:53

## 2024-06-30 RX ADMIN — SPIRONOLACTONE 25 MG: 25 TABLET ORAL at 20:53

## 2024-06-30 RX ADMIN — ACETAMINOPHEN 650 MG: 325 TABLET ORAL at 15:39

## 2024-06-30 RX ADMIN — SODIUM CHLORIDE, PRESERVATIVE FREE 10 ML: 5 INJECTION INTRAVENOUS at 07:46

## 2024-06-30 RX ADMIN — BUSPIRONE HYDROCHLORIDE 10 MG: 5 TABLET ORAL at 20:18

## 2024-06-30 RX ADMIN — SODIUM CHLORIDE, PRESERVATIVE FREE 10 ML: 5 INJECTION INTRAVENOUS at 20:27

## 2024-06-30 RX ADMIN — Medication 400 MG: at 20:18

## 2024-06-30 RX ADMIN — ANTI-FUNGAL POWDER MICONAZOLE NITRATE TALC FREE: 1.42 POWDER TOPICAL at 20:55

## 2024-06-30 RX ADMIN — ANTI-FUNGAL POWDER MICONAZOLE NITRATE TALC FREE: 1.42 POWDER TOPICAL at 07:46

## 2024-06-30 RX ADMIN — HYDRALAZINE HYDROCHLORIDE 50 MG: 50 TABLET ORAL at 20:18

## 2024-06-30 RX ADMIN — INSULIN GLARGINE 30 UNITS: 100 INJECTION, SOLUTION SUBCUTANEOUS at 07:46

## 2024-06-30 RX ADMIN — HYDRALAZINE HYDROCHLORIDE 50 MG: 50 TABLET ORAL at 14:53

## 2024-06-30 RX ADMIN — Medication 3 MG: at 20:53

## 2024-06-30 RX ADMIN — Medication 1 CAPSULE: at 07:46

## 2024-06-30 RX ADMIN — SPIRONOLACTONE 25 MG: 25 TABLET ORAL at 07:46

## 2024-06-30 RX ADMIN — DIVALPROEX SODIUM 250 MG: 250 TABLET, DELAYED RELEASE ORAL at 07:45

## 2024-06-30 RX ADMIN — METOPROLOL 100 MG: 100 TABLET ORAL at 20:18

## 2024-06-30 RX ADMIN — HYDRALAZINE HYDROCHLORIDE 50 MG: 50 TABLET ORAL at 05:12

## 2024-06-30 RX ADMIN — POLYETHYLENE GLYCOL 3350 17 G: 17 POWDER, FOR SOLUTION ORAL at 07:46

## 2024-06-30 RX ADMIN — BUSPIRONE HYDROCHLORIDE 10 MG: 5 TABLET ORAL at 14:53

## 2024-06-30 RX ADMIN — NIFEDIPINE 30 MG: 30 TABLET, EXTENDED RELEASE ORAL at 20:22

## 2024-06-30 RX ADMIN — ASPIRIN 81 MG: 81 TABLET, COATED ORAL at 07:46

## 2024-06-30 RX ADMIN — VENLAFAXINE HYDROCHLORIDE 75 MG: 75 CAPSULE, EXTENDED RELEASE ORAL at 07:46

## 2024-06-30 RX ADMIN — INSULIN LISPRO 4 UNITS: 100 INJECTION, SOLUTION INTRAVENOUS; SUBCUTANEOUS at 16:32

## 2024-06-30 RX ADMIN — ATORVASTATIN CALCIUM 40 MG: 40 TABLET, FILM COATED ORAL at 20:18

## 2024-06-30 RX ADMIN — ACETAMINOPHEN 650 MG: 325 TABLET ORAL at 21:06

## 2024-06-30 ASSESSMENT — PAIN DESCRIPTION - ORIENTATION
ORIENTATION: ANTERIOR
ORIENTATION: MID;POSTERIOR

## 2024-06-30 ASSESSMENT — PAIN - FUNCTIONAL ASSESSMENT
PAIN_FUNCTIONAL_ASSESSMENT: ACTIVITIES ARE NOT PREVENTED

## 2024-06-30 ASSESSMENT — PAIN SCALES - GENERAL
PAINLEVEL_OUTOF10: 5
PAINLEVEL_OUTOF10: 2
PAINLEVEL_OUTOF10: 3
PAINLEVEL_OUTOF10: 1
PAINLEVEL_OUTOF10: 4
PAINLEVEL_OUTOF10: 3
PAINLEVEL_OUTOF10: 0
PAINLEVEL_OUTOF10: 0

## 2024-06-30 ASSESSMENT — PAIN DESCRIPTION - LOCATION
LOCATION: HEAD;NECK
LOCATION: HEAD
LOCATION: HEAD

## 2024-06-30 ASSESSMENT — 9 HOLE PEG TEST
TESTTIME_SECONDS: 22.7
TESTTIME_SECONDS: 25.39
TEST_RESULT: FUNCTIONAL
TEST_RESULT: FUNCTIONAL

## 2024-06-30 ASSESSMENT — PAIN DESCRIPTION - ONSET: ONSET: ON-GOING

## 2024-06-30 ASSESSMENT — PAIN DESCRIPTION - PAIN TYPE: TYPE: ACUTE PAIN

## 2024-06-30 ASSESSMENT — PAIN SCALES - WONG BAKER: WONGBAKER_NUMERICALRESPONSE: HURTS A LITTLE BIT

## 2024-06-30 ASSESSMENT — PAIN DESCRIPTION - FREQUENCY: FREQUENCY: INTERMITTENT

## 2024-06-30 ASSESSMENT — PAIN DESCRIPTION - DESCRIPTORS
DESCRIPTORS: ACHING
DESCRIPTORS: SHARP
DESCRIPTORS: DULL;POUNDING

## 2024-06-30 NOTE — DISCHARGE SUMMARY
every 6 hours as needed for Headaches     cefTRIAXone  infusion  Commonly known as: ROCEPHIN  Infuse 2,000 mg intravenously every 24 hours for 14 days Compound per protocol     divalproex 250 MG DR tablet  Commonly known as: DEPAKOTE  Take 1 tablet by mouth 2 times daily     hydrALAZINE 50 MG tablet  Commonly known as: APRESOLINE  Take 1 tablet by mouth every 8 hours     lactobacillus capsule  Take 1 capsule by mouth daily (with breakfast)     magnesium oxide 400 (240 Mg) MG tablet  Commonly known as: MAG-OX  Take 1 tablet by mouth 3 times daily     melatonin 3 MG Tabs tablet  Take 1 tablet by mouth nightly as needed (insomnia)     metoprolol 100 MG tablet  Commonly known as: LOPRESSOR  Take 1 tablet by mouth 2 times daily     miconazole 2 % powder  Commonly known as: MICOTIN  Apply topically 2 times daily.     NIFEdipine 30 MG extended release tablet  Commonly known as: ADALAT CC  Take 1 tablet by mouth nightly     spironolactone 25 MG tablet  Commonly known as: ALDACTONE  Take 1 tablet by mouth in the morning and 1 tablet in the evening.     tiZANidine 4 MG tablet  Commonly known as: ZANAFLEX  Take 1 tablet by mouth every 8 hours as needed (muscle spasm)            CHANGE how you take these medications      lisinopril 20 MG tablet  Commonly known as: PRINIVIL;ZESTRIL  Take 1 tablet by mouth daily  What changed:   medication strength  how much to take            CONTINUE taking these medications      Aspirin Low Dose 81 MG EC tablet  Generic drug: aspirin  TAKE 1 TABLET BY MOUTH ONE TIME A DAY     atorvastatin 40 MG tablet  Commonly known as: LIPITOR  take 1 tablet by mouth at bedtime     busPIRone 10 MG tablet  Commonly known as: BUSPAR  take 1 tablet by mouth three times a day     diclofenac sodium 1 % Gel  Commonly known as: VOLTAREN  APPLY TWO GRAMS TOPICALLY FOUR TIMES A DAY, MORNING, NOON, EVENING AND BEFORE BEDTIME     docusate sodium 100 MG capsule  Commonly known as: COLACE     Elbow Brace Misc  1 each by

## 2024-07-01 PROCEDURE — 99232 SBSQ HOSP IP/OBS MODERATE 35: CPT | Performed by: PHYSICAL MEDICINE & REHABILITATION

## 2024-07-01 PROCEDURE — 6370000000 HC RX 637 (ALT 250 FOR IP): Performed by: PHYSICAL MEDICINE & REHABILITATION

## 2024-07-01 PROCEDURE — 6360000002 HC RX W HCPCS: Performed by: STUDENT IN AN ORGANIZED HEALTH CARE EDUCATION/TRAINING PROGRAM

## 2024-07-01 PROCEDURE — 2580000003 HC RX 258: Performed by: NURSE PRACTITIONER

## 2024-07-01 PROCEDURE — 6370000000 HC RX 637 (ALT 250 FOR IP): Performed by: INTERNAL MEDICINE

## 2024-07-01 PROCEDURE — 97130 THER IVNTJ EA ADDL 15 MIN: CPT

## 2024-07-01 PROCEDURE — 97530 THERAPEUTIC ACTIVITIES: CPT

## 2024-07-01 PROCEDURE — 99232 SBSQ HOSP IP/OBS MODERATE 35: CPT | Performed by: INTERNAL MEDICINE

## 2024-07-01 PROCEDURE — 6360000002 HC RX W HCPCS: Performed by: PHYSICAL MEDICINE & REHABILITATION

## 2024-07-01 PROCEDURE — 6370000000 HC RX 637 (ALT 250 FOR IP): Performed by: PSYCHIATRY & NEUROLOGY

## 2024-07-01 PROCEDURE — 1180000000 HC REHAB R&B

## 2024-07-01 PROCEDURE — 2580000003 HC RX 258: Performed by: STUDENT IN AN ORGANIZED HEALTH CARE EDUCATION/TRAINING PROGRAM

## 2024-07-01 PROCEDURE — 97129 THER IVNTJ 1ST 15 MIN: CPT

## 2024-07-01 PROCEDURE — 97116 GAIT TRAINING THERAPY: CPT

## 2024-07-01 RX ORDER — TIZANIDINE 4 MG/1
4 TABLET ORAL EVERY 8 HOURS PRN
Status: DISCONTINUED | OUTPATIENT
Start: 2024-07-01 | End: 2024-07-03 | Stop reason: HOSPADM

## 2024-07-01 RX ADMIN — DICLOFENAC SODIUM 2 G: 10 GEL TOPICAL at 08:17

## 2024-07-01 RX ADMIN — SPIRONOLACTONE 25 MG: 25 TABLET ORAL at 17:22

## 2024-07-01 RX ADMIN — ENOXAPARIN SODIUM 40 MG: 100 INJECTION SUBCUTANEOUS at 07:57

## 2024-07-01 RX ADMIN — Medication 3 MG: at 20:06

## 2024-07-01 RX ADMIN — BUTALBITA,ACETAMINOPHEN AND CAFFEINE 1 CAPSULE: 50; 300; 40 CAPSULE ORAL at 15:20

## 2024-07-01 RX ADMIN — SPIRONOLACTONE 25 MG: 25 TABLET ORAL at 07:59

## 2024-07-01 RX ADMIN — INSULIN LISPRO 2 UNITS: 100 INJECTION, SOLUTION INTRAVENOUS; SUBCUTANEOUS at 17:21

## 2024-07-01 RX ADMIN — Medication 1 CAPSULE: at 08:01

## 2024-07-01 RX ADMIN — BUSPIRONE HYDROCHLORIDE 10 MG: 5 TABLET ORAL at 20:05

## 2024-07-01 RX ADMIN — SODIUM CHLORIDE, PRESERVATIVE FREE 10 ML: 5 INJECTION INTRAVENOUS at 08:04

## 2024-07-01 RX ADMIN — BUSPIRONE HYDROCHLORIDE 10 MG: 5 TABLET ORAL at 15:05

## 2024-07-01 RX ADMIN — BUSPIRONE HYDROCHLORIDE 10 MG: 5 TABLET ORAL at 08:07

## 2024-07-01 RX ADMIN — VENLAFAXINE HYDROCHLORIDE 75 MG: 75 CAPSULE, EXTENDED RELEASE ORAL at 08:01

## 2024-07-01 RX ADMIN — SODIUM CHLORIDE, PRESERVATIVE FREE 10 ML: 5 INJECTION INTRAVENOUS at 21:18

## 2024-07-01 RX ADMIN — PANTOPRAZOLE SODIUM 40 MG: 40 TABLET, DELAYED RELEASE ORAL at 05:25

## 2024-07-01 RX ADMIN — HYDRALAZINE HYDROCHLORIDE 50 MG: 50 TABLET ORAL at 05:25

## 2024-07-01 RX ADMIN — ATORVASTATIN CALCIUM 40 MG: 40 TABLET, FILM COATED ORAL at 20:05

## 2024-07-01 RX ADMIN — Medication 400 MG: at 08:08

## 2024-07-01 RX ADMIN — ONDANSETRON 4 MG: 4 TABLET, ORALLY DISINTEGRATING ORAL at 03:01

## 2024-07-01 RX ADMIN — DIVALPROEX SODIUM 250 MG: 250 TABLET, DELAYED RELEASE ORAL at 21:18

## 2024-07-01 RX ADMIN — ASPIRIN 81 MG: 81 TABLET, COATED ORAL at 08:02

## 2024-07-01 RX ADMIN — BUTALBITA,ACETAMINOPHEN AND CAFFEINE 1 CAPSULE: 50; 300; 40 CAPSULE ORAL at 06:06

## 2024-07-01 RX ADMIN — NIFEDIPINE 30 MG: 30 TABLET, EXTENDED RELEASE ORAL at 20:05

## 2024-07-01 RX ADMIN — Medication 400 MG: at 15:05

## 2024-07-01 RX ADMIN — HYDRALAZINE HYDROCHLORIDE 50 MG: 50 TABLET ORAL at 21:17

## 2024-07-01 RX ADMIN — DIVALPROEX SODIUM 250 MG: 250 TABLET, DELAYED RELEASE ORAL at 07:58

## 2024-07-01 RX ADMIN — ANTI-FUNGAL POWDER MICONAZOLE NITRATE TALC FREE: 1.42 POWDER TOPICAL at 08:08

## 2024-07-01 RX ADMIN — METOPROLOL 100 MG: 100 TABLET ORAL at 08:03

## 2024-07-01 RX ADMIN — INSULIN GLARGINE 30 UNITS: 100 INJECTION, SOLUTION SUBCUTANEOUS at 08:04

## 2024-07-01 RX ADMIN — ROPINIROLE HYDROCHLORIDE 2 MG: 1 TABLET, FILM COATED ORAL at 20:06

## 2024-07-01 RX ADMIN — HYDRALAZINE HYDROCHLORIDE 50 MG: 50 TABLET ORAL at 15:05

## 2024-07-01 RX ADMIN — Medication 400 MG: at 20:05

## 2024-07-01 RX ADMIN — ANTI-FUNGAL POWDER MICONAZOLE NITRATE TALC FREE: 1.42 POWDER TOPICAL at 21:18

## 2024-07-01 RX ADMIN — LATANOPROST 1 DROP: 50 SOLUTION OPHTHALMIC at 20:06

## 2024-07-01 RX ADMIN — METOPROLOL 100 MG: 100 TABLET ORAL at 20:05

## 2024-07-01 RX ADMIN — CEFTRIAXONE SODIUM 2000 MG: 2 INJECTION, POWDER, FOR SOLUTION INTRAMUSCULAR; INTRAVENOUS at 05:42

## 2024-07-01 RX ADMIN — LISINOPRIL 20 MG: 20 TABLET ORAL at 08:01

## 2024-07-01 RX ADMIN — BUTALBITA,ACETAMINOPHEN AND CAFFEINE 1 CAPSULE: 50; 300; 40 CAPSULE ORAL at 20:06

## 2024-07-01 RX ADMIN — BUTALBITA,ACETAMINOPHEN AND CAFFEINE 1 CAPSULE: 50; 300; 40 CAPSULE ORAL at 11:14

## 2024-07-01 ASSESSMENT — PAIN SCALES - GENERAL
PAINLEVEL_OUTOF10: 5
PAINLEVEL_OUTOF10: 9
PAINLEVEL_OUTOF10: 9
PAINLEVEL_OUTOF10: 5
PAINLEVEL_OUTOF10: 7
PAINLEVEL_OUTOF10: 7
PAINLEVEL_OUTOF10: 8
PAINLEVEL_OUTOF10: 7
PAINLEVEL_OUTOF10: 6

## 2024-07-01 ASSESSMENT — PAIN DESCRIPTION - DESCRIPTORS
DESCRIPTORS: ACHING
DESCRIPTORS: PRESSURE;ACHING
DESCRIPTORS: ACHING;STABBING
DESCRIPTORS: ACHING
DESCRIPTORS: ACHING

## 2024-07-01 ASSESSMENT — PAIN - FUNCTIONAL ASSESSMENT
PAIN_FUNCTIONAL_ASSESSMENT: PREVENTS OR INTERFERES SOME ACTIVE ACTIVITIES AND ADLS
PAIN_FUNCTIONAL_ASSESSMENT: ACTIVITIES ARE NOT PREVENTED
PAIN_FUNCTIONAL_ASSESSMENT: ACTIVITIES ARE NOT PREVENTED

## 2024-07-01 ASSESSMENT — PAIN SCALES - WONG BAKER: WONGBAKER_NUMERICALRESPONSE: HURTS EVEN MORE

## 2024-07-01 ASSESSMENT — PAIN DESCRIPTION - ORIENTATION
ORIENTATION: RIGHT;LEFT;MID
ORIENTATION: LEFT;ANTERIOR
ORIENTATION: LEFT;RIGHT
ORIENTATION: DISTAL;ANTERIOR

## 2024-07-01 ASSESSMENT — PAIN DESCRIPTION - LOCATION
LOCATION: NECK
LOCATION: EYE;HEAD
LOCATION: NECK;HEAD
LOCATION: HEAD;NECK
LOCATION: EAR;HEAD
LOCATION: HEAD

## 2024-07-01 ASSESSMENT — PAIN DESCRIPTION - PAIN TYPE: TYPE: ACUTE PAIN

## 2024-07-01 ASSESSMENT — PAIN DESCRIPTION - FREQUENCY: FREQUENCY: INTERMITTENT

## 2024-07-01 ASSESSMENT — PAIN DESCRIPTION - ONSET: ONSET: PROGRESSIVE

## 2024-07-01 NOTE — PATIENT CARE CONFERENCE
[facility]  TEAM CONFERENCE NOTE  Date: 24  Patient Name: Kavya De Santiago       Room: 2608/2608-01  MRN: [id number]       : 1960  (64 y.o.)     Gender: female     Referring Practitioner: Jose Moran MD     PRINCIPAL DIAGNOSIS: Encephalopathy    NURSING    Bladder Continence  Always Continent  Bowel Continence Always Continent    Date of Last BM: 2024    Bladder/Bowel Program Interventions: Both Bowel & Bladder Program In Place     Wounds/Incisions/Ulcers:  skin breakdown underneath bilat breast, being treated with antifungal powder.    Pain Control:  patient continues to complaining  abut pain in bilat ear, and is asking for ENT consult.     Pain Medication Regimen Usage Pattern: MAR reviewed and pain medications are being used at the following frequency (Specify Medication, # Doses Administered on average per day, identified patterns of use - for example: time of day, prior to activity/therapy)  Tylenol 650mg Q6 prn 1-2 times a day     Fall Risk:  Falling star program initiated    Medication Education Program: Patient able to manage medications and being educated by nursing    Discharge Preparation Patient/Responsible Party Education In Progress:   Fingerstick Glucose Monitoring and Insulin Management    Nursing specific communication for TEAM: No additional information identified requiring communication at this time    PHYSICAL THERAPY      Bed Mobility:      Supine to Sit: Stand by assistance  Sit to Supine: Stand by assistance  Scooting: Stand by assistance  Bed Mobility Comments: HOB flat, without railing    Transfers:  Sit to Stand: Stand by assistance  Stand to Sit: Stand by assistance  Bed to Chair: Contact guard assistance    Ambulation  Surface: Level tile  Device: Single point cane (hurry cane base left hand)  Assistance: Contact guard assistance  Quality of Gait: decreased left step lenght , right stance time , right hip ext mid stance 
  [facility]  TEAM CONFERENCE NOTE  Date: 24  Patient Name: Kavya De Santiago       Room: 2608/2608-01  MRN: [id number]       : 1960  (64 y.o.)     Gender: female     Referring Practitioner: Jose Moran MD     PRINCIPAL DIAGNOSIS: Encephalopathy    NURSING    Bladder Continence  Always Continent  Bowel Continence Always Continent    Date of Last BM: 24    Bladder/Bowel Program Interventions: Both Bowel & Bladder Program In Place     Wounds/Incisions/Ulcers: No skin issues identified    Pain Control: Patient's pain currently controlled and pain regimen effective as ordered    Pain Medication Regimen Usage Pattern: MAR reviewed and pain medications are being used at the following frequency (Specify Medication, # Doses Administered on average per day, identified patterns of use - for example: time of day, prior to activity/therapy)  Fioricet q4h PRN headaches- took last night before bed    Fall Risk:  Falling star program initiated    Medication Education Program: Patient able to manage medications and being educated by nursing    Discharge Preparation Patient/Responsible Party Education In Progress:   IV Infusion and IV Access Management    Nursing specific communication for TEAM: No additional information identified requiring communication at this time    PHYSICAL THERAPY      Bed Mobility:    Mod-I        Transfers:  Sit to Stand: Modified independent  Stand to Sit: Modified independent  Bed to Chair: Modified independent    Ambulation  Surface: Level tile;Ramp  Device: Rolling Walker  Assistance: Modified Independent  Quality of Gait: decreased left step length , right stance time, steady gait, improved endurance  Gait Deviations: Slow Kaelyn;Decreased step length;Decreased step height;Decreased head and trunk rotation  Distance: 500 feet + x 2  Comments: seated rest break and continued ambulation    Ambulation:  # Steps : 5  Stairs Height: 8\"  Rails: 
steady gait, improved endurance  Gait Deviations: Slow Kaelyn;Decreased step length;Decreased step height;Decreased head and trunk rotation  Distance: 2MWT 215 feet; 254 feet  Ambulation 2  Surface - 2: level tile;outdoors;ramp  Device 2: Single point cane  Gait Deviations: Slow Kaelyn  Distance: 50 feet x2    Ambulation:  # Steps : 17 (Seated rest at top of staircase before descending back down.)  Stairs Height: 8\"  Rails: Left ascending  Device: Single pt cane ([employer])  Assistance: Stand by assistance  Comment: Pt completed step over step technique to ascend steps, Step to technique while ascending, Step to when descending. Chair placed at top of staircase. Pt sits after ascending steps for rest break before descending steps  Assistance Needed: Independent    Goals  Time Frame for Short Term Goals: 7 days  Short Term Goal 1: Pt to demo All bed mobility on a flat bed without rails and CGA  Short Term Goal 2: Pt to demo safe functional transfers with device and CGA  Short Term Goal 3: Pt to ambulate 50-75' with device and SBA  Short Term Goal 4: Pt to negotiate 12-20 steps with single rail and device as needed with CGA to allow for home access                Continued Therapy After Discharge: Outpatient Therapy Recommended    DME Recommendations: Other: Pt has rolling walker at home    OCCUPATIONAL THERAPY    SELF CARE        Feeding  Assistance Level: Set-up  Skilled Clinical Factors: Per pt report.          Grooming/Oral Hygiene  Assistance Level: Supervision  Skilled Clinical Factors: Standing at sink for hair brushing and blow-drying hair.        Upper Extremity Bathing  Assistance Level: Set-up  Skilled Clinical Factors: Seated on shower bench.       Lower Extremity Bathing  Assistance Level: Supervision  Skilled Clinical Factors: Able to complete seated and standing tasks safely, using grab bar as needed for balance.        Upper Extremity Dressing  Assistance Level: Set-up  Skilled Clinical Factors:

## 2024-07-01 NOTE — CARE COORDINATION
ARU CASE MANAGEMENT NOTE:    Admission Date:  6/14/2024 Kavya De Santiago is a 64 y.o.  female    Admitted for : Meningitis [G03.9]  Encephalopathy [G93.40]    Patient was still not showing active in [facility] on Friday per Kelly with public benefits. Patient had her  go to the [facility] office on Friday afternoon. He was told that the patient needed to call and pick a managed care plan but that she is active for June and July. The patient called this morning and requested to be assigned to [employer]. Per the patient, the rep on the phone told her that [place] [facility] would not be active until 8/1 and that they would not back date. Patient explained the situation to the rep and the rep was unable to provide any further information. Patient called RN CM into the room and explained the above situation. RN CM called and left a VM with both Indigo and Kelly in public benefits requesting a call back to clarify.    Will continue to follow for additional discharge needs.    Electronically signed by Terrie Johnson RN on 7/1/2024 at 10:05 AM

## 2024-07-01 NOTE — INTERDISCIPLINARY ROUNDS
[facility]  INTERDISCIPLINARY MEETING  Date: 24  Patient Name: Kavya De Santiago       Room: 2608/2608-01  MRN: [id number]       : 1960  (64 y.o.)     Gender: female     Patient's care team, including nursing, speech language pathologist, occupational therapist, and/or physical therapist, met to discuss patient's care needs. Any patient concerns, change in medical status, and current transfer/mobility status were identified at this time.     Any Additional Pertinent Information:   Pt is 1 person assist with Standard cane.    Participating Team Members:  Nurse: Joyce Ellington, RN    Occupational Therapist: Caroline Brown OT   Physical Therapist: Jazlyn Elias PT  Speech Therapist:  N/A

## 2024-07-02 LAB
ANION GAP SERPL CALCULATED.3IONS-SCNC: 8 MMOL/L (ref 9–17)
BASOPHILS # BLD: 0.1 K/UL (ref 0–0.2)
BASOPHILS NFR BLD: 1 % (ref 0–2)
BUN SERPL-MCNC: 13 MG/DL (ref 8–23)
CALCIUM SERPL-MCNC: 8.9 MG/DL (ref 8.6–10.4)
CHLORIDE SERPL-SCNC: 105 MMOL/L (ref 98–107)
CO2 SERPL-SCNC: 29 MMOL/L (ref 20–31)
CREAT SERPL-MCNC: 0.8 MG/DL (ref 0.5–0.9)
EOSINOPHIL # BLD: 0.5 K/UL (ref 0–0.4)
EOSINOPHILS RELATIVE PERCENT: 6 % (ref 0–4)
ERYTHROCYTE [DISTWIDTH] IN BLOOD BY AUTOMATED COUNT: 16.5 % (ref 11.5–14.9)
GFR, ESTIMATED: 83 ML/MIN/1.73M2
GLUCOSE SERPL-MCNC: 207 MG/DL (ref 70–99)
HCT VFR BLD AUTO: 28.1 % (ref 36–46)
HGB BLD-MCNC: 9.1 G/DL (ref 12–16)
LYMPHOCYTES NFR BLD: 1.8 K/UL (ref 1–4.8)
LYMPHOCYTES RELATIVE PERCENT: 24 % (ref 24–44)
MAGNESIUM SERPL-MCNC: 1.8 MG/DL (ref 1.6–2.6)
MCH RBC QN AUTO: 28.6 PG (ref 26–34)
MCHC RBC AUTO-ENTMCNC: 32.2 G/DL (ref 31–37)
MCV RBC AUTO: 88.8 FL (ref 80–100)
MONOCYTES NFR BLD: 0.9 K/UL (ref 0.1–1.3)
MONOCYTES NFR BLD: 13 % (ref 1–7)
NEUTROPHILS NFR BLD: 56 % (ref 36–66)
NEUTS SEG NFR BLD: 4.2 K/UL (ref 1.3–9.1)
PLATELET # BLD AUTO: 207 K/UL (ref 150–450)
PMV BLD AUTO: 8.3 FL (ref 6–12)
POTASSIUM SERPL-SCNC: 4.8 MMOL/L (ref 3.7–5.3)
RBC # BLD AUTO: 3.17 M/UL (ref 4–5.2)
SODIUM SERPL-SCNC: 142 MMOL/L (ref 135–144)
WBC OTHER # BLD: 7.4 K/UL (ref 3.5–11)

## 2024-07-02 PROCEDURE — 6360000002 HC RX W HCPCS: Performed by: PHYSICAL MEDICINE & REHABILITATION

## 2024-07-02 PROCEDURE — 97110 THERAPEUTIC EXERCISES: CPT

## 2024-07-02 PROCEDURE — 97116 GAIT TRAINING THERAPY: CPT

## 2024-07-02 PROCEDURE — 6370000000 HC RX 637 (ALT 250 FOR IP): Performed by: INTERNAL MEDICINE

## 2024-07-02 PROCEDURE — 99233 SBSQ HOSP IP/OBS HIGH 50: CPT | Performed by: PHYSICAL MEDICINE & REHABILITATION

## 2024-07-02 PROCEDURE — 97130 THER IVNTJ EA ADDL 15 MIN: CPT

## 2024-07-02 PROCEDURE — 2580000003 HC RX 258: Performed by: STUDENT IN AN ORGANIZED HEALTH CARE EDUCATION/TRAINING PROGRAM

## 2024-07-02 PROCEDURE — 6370000000 HC RX 637 (ALT 250 FOR IP): Performed by: PHYSICAL MEDICINE & REHABILITATION

## 2024-07-02 PROCEDURE — 85025 COMPLETE CBC W/AUTO DIFF WBC: CPT

## 2024-07-02 PROCEDURE — 99232 SBSQ HOSP IP/OBS MODERATE 35: CPT | Performed by: INTERNAL MEDICINE

## 2024-07-02 PROCEDURE — 80048 BASIC METABOLIC PNL TOTAL CA: CPT

## 2024-07-02 PROCEDURE — 97530 THERAPEUTIC ACTIVITIES: CPT

## 2024-07-02 PROCEDURE — 6360000002 HC RX W HCPCS: Performed by: STUDENT IN AN ORGANIZED HEALTH CARE EDUCATION/TRAINING PROGRAM

## 2024-07-02 PROCEDURE — 97129 THER IVNTJ 1ST 15 MIN: CPT

## 2024-07-02 PROCEDURE — 1180000000 HC REHAB R&B

## 2024-07-02 PROCEDURE — 97535 SELF CARE MNGMENT TRAINING: CPT

## 2024-07-02 PROCEDURE — 6370000000 HC RX 637 (ALT 250 FOR IP): Performed by: PSYCHIATRY & NEUROLOGY

## 2024-07-02 PROCEDURE — 2580000003 HC RX 258: Performed by: NURSE PRACTITIONER

## 2024-07-02 PROCEDURE — 36415 COLL VENOUS BLD VENIPUNCTURE: CPT

## 2024-07-02 PROCEDURE — 83735 ASSAY OF MAGNESIUM: CPT

## 2024-07-02 RX ORDER — LANOLIN ALCOHOL/MO/W.PET/CERES
3 CREAM (GRAM) TOPICAL NIGHTLY PRN
Refills: 3 | COMMUNITY
Start: 2024-07-02

## 2024-07-02 RX ORDER — GLIPIZIDE 5 MG/1
5 TABLET ORAL
Qty: 180 TABLET | Refills: 3 | Status: SHIPPED | OUTPATIENT
Start: 2024-07-02

## 2024-07-02 RX ORDER — DIVALPROEX SODIUM 250 MG/1
250 TABLET, DELAYED RELEASE ORAL 2 TIMES DAILY
Qty: 90 TABLET | Refills: 3 | Status: SHIPPED | OUTPATIENT
Start: 2024-07-02

## 2024-07-02 RX ORDER — BUTALBITAL, ACETAMINOPHEN AND CAFFEINE 300; 40; 50 MG/1; MG/1; MG/1
1 CAPSULE ORAL EVERY 6 HOURS PRN
Qty: 84 CAPSULE | Refills: 0 | Status: SHIPPED | OUTPATIENT
Start: 2024-07-02

## 2024-07-02 RX ORDER — HYDRALAZINE HYDROCHLORIDE 50 MG/1
50 TABLET, FILM COATED ORAL EVERY 8 HOURS SCHEDULED
Qty: 90 TABLET | Refills: 3 | Status: SHIPPED | OUTPATIENT
Start: 2024-07-02

## 2024-07-02 RX ORDER — TIZANIDINE 4 MG/1
4 TABLET ORAL EVERY 8 HOURS PRN
Qty: 21 TABLET | Refills: 0 | Status: SHIPPED | OUTPATIENT
Start: 2024-07-02

## 2024-07-02 RX ORDER — NIFEDIPINE 30 MG
30 TABLET, EXTENDED RELEASE ORAL NIGHTLY
Qty: 30 TABLET | Refills: 3 | Status: SHIPPED | OUTPATIENT
Start: 2024-07-02

## 2024-07-02 RX ORDER — SPIRONOLACTONE 25 MG/1
25 TABLET ORAL 2 TIMES DAILY
Qty: 30 TABLET | Refills: 3 | Status: SHIPPED | OUTPATIENT
Start: 2024-07-03

## 2024-07-02 RX ORDER — LISINOPRIL 20 MG/1
20 TABLET ORAL DAILY
Qty: 30 TABLET | Refills: 3 | Status: SHIPPED | OUTPATIENT
Start: 2024-07-03

## 2024-07-02 RX ORDER — METOPROLOL TARTRATE 100 MG/1
100 TABLET ORAL 2 TIMES DAILY
Qty: 60 TABLET | Refills: 3 | Status: SHIPPED | OUTPATIENT
Start: 2024-07-02

## 2024-07-02 RX ORDER — LANOLIN ALCOHOL/MO/W.PET/CERES
400 CREAM (GRAM) TOPICAL 3 TIMES DAILY
Qty: 30 TABLET | Refills: 2 | Status: SHIPPED | OUTPATIENT
Start: 2024-07-02

## 2024-07-02 RX ORDER — LACTOBACILLUS RHAMNOSUS GG 10B CELL
1 CAPSULE ORAL
COMMUNITY
Start: 2024-07-03

## 2024-07-02 RX ADMIN — HYDRALAZINE HYDROCHLORIDE 50 MG: 50 TABLET ORAL at 13:41

## 2024-07-02 RX ADMIN — DIVALPROEX SODIUM 250 MG: 250 TABLET, DELAYED RELEASE ORAL at 07:56

## 2024-07-02 RX ADMIN — BUSPIRONE HYDROCHLORIDE 10 MG: 5 TABLET ORAL at 07:53

## 2024-07-02 RX ADMIN — ATORVASTATIN CALCIUM 40 MG: 40 TABLET, FILM COATED ORAL at 19:56

## 2024-07-02 RX ADMIN — METOPROLOL 100 MG: 100 TABLET ORAL at 07:53

## 2024-07-02 RX ADMIN — Medication 400 MG: at 07:53

## 2024-07-02 RX ADMIN — Medication 3 MG: at 19:56

## 2024-07-02 RX ADMIN — TIZANIDINE 4 MG: 4 TABLET ORAL at 20:11

## 2024-07-02 RX ADMIN — CEFTRIAXONE SODIUM 2000 MG: 2 INJECTION, POWDER, FOR SOLUTION INTRAMUSCULAR; INTRAVENOUS at 05:40

## 2024-07-02 RX ADMIN — HYDRALAZINE HYDROCHLORIDE 50 MG: 50 TABLET ORAL at 20:11

## 2024-07-02 RX ADMIN — DIVALPROEX SODIUM 250 MG: 250 TABLET, DELAYED RELEASE ORAL at 20:02

## 2024-07-02 RX ADMIN — ROPINIROLE HYDROCHLORIDE 2 MG: 1 TABLET, FILM COATED ORAL at 19:46

## 2024-07-02 RX ADMIN — SPIRONOLACTONE 25 MG: 25 TABLET ORAL at 07:53

## 2024-07-02 RX ADMIN — PANTOPRAZOLE SODIUM 40 MG: 40 TABLET, DELAYED RELEASE ORAL at 05:35

## 2024-07-02 RX ADMIN — Medication 400 MG: at 19:56

## 2024-07-02 RX ADMIN — ANTI-FUNGAL POWDER MICONAZOLE NITRATE TALC FREE: 1.42 POWDER TOPICAL at 07:54

## 2024-07-02 RX ADMIN — BUSPIRONE HYDROCHLORIDE 10 MG: 5 TABLET ORAL at 13:41

## 2024-07-02 RX ADMIN — ENOXAPARIN SODIUM 40 MG: 100 INJECTION SUBCUTANEOUS at 07:55

## 2024-07-02 RX ADMIN — Medication 400 MG: at 13:41

## 2024-07-02 RX ADMIN — VENLAFAXINE HYDROCHLORIDE 75 MG: 75 CAPSULE, EXTENDED RELEASE ORAL at 07:53

## 2024-07-02 RX ADMIN — BUSPIRONE HYDROCHLORIDE 10 MG: 5 TABLET ORAL at 19:56

## 2024-07-02 RX ADMIN — Medication 1 CAPSULE: at 07:53

## 2024-07-02 RX ADMIN — METOPROLOL 100 MG: 100 TABLET ORAL at 19:56

## 2024-07-02 RX ADMIN — LATANOPROST 1 DROP: 50 SOLUTION OPHTHALMIC at 20:02

## 2024-07-02 RX ADMIN — INSULIN GLARGINE 30 UNITS: 100 INJECTION, SOLUTION SUBCUTANEOUS at 07:56

## 2024-07-02 RX ADMIN — HYDRALAZINE HYDROCHLORIDE 50 MG: 50 TABLET ORAL at 05:35

## 2024-07-02 RX ADMIN — NIFEDIPINE 30 MG: 30 TABLET, EXTENDED RELEASE ORAL at 20:02

## 2024-07-02 RX ADMIN — SODIUM CHLORIDE, PRESERVATIVE FREE 10 ML: 5 INJECTION INTRAVENOUS at 07:54

## 2024-07-02 RX ADMIN — LISINOPRIL 20 MG: 20 TABLET ORAL at 07:53

## 2024-07-02 RX ADMIN — SPIRONOLACTONE 25 MG: 25 TABLET ORAL at 16:41

## 2024-07-02 RX ADMIN — ASPIRIN 81 MG: 81 TABLET, COATED ORAL at 07:53

## 2024-07-02 RX ADMIN — SODIUM CHLORIDE, PRESERVATIVE FREE 10 ML: 5 INJECTION INTRAVENOUS at 20:02

## 2024-07-02 ASSESSMENT — PAIN SCALES - GENERAL
PAINLEVEL_OUTOF10: 2
PAINLEVEL_OUTOF10: 6
PAINLEVEL_OUTOF10: 0

## 2024-07-02 ASSESSMENT — PAIN DESCRIPTION - ORIENTATION: ORIENTATION: POSTERIOR;MID

## 2024-07-02 ASSESSMENT — PAIN - FUNCTIONAL ASSESSMENT: PAIN_FUNCTIONAL_ASSESSMENT: PREVENTS OR INTERFERES SOME ACTIVE ACTIVITIES AND ADLS

## 2024-07-02 ASSESSMENT — PAIN DESCRIPTION - LOCATION: LOCATION: BACK;NECK

## 2024-07-02 NOTE — CARE COORDINATION
ARU CASE MANAGEMENT NOTE:    Admission Date:  6/14/2024 Kavya De Santiago is a 64 y.o.  female    Admitted for : Meningitis [G03.9]  Encephalopathy [G93.40]    Patient is alert and oriented x4.    Spoke with patient regarding discharge plan: [place] medicaid number has been added to patient's payor. Sent referral and written atb script to Lara. Per therapy in IDT, patient will not require OP therapy. After thorough discussion and education on PICC line management, patient in agreement that  will be able to help with infusion. [facility] received the fax and is checking benefits now. They will have their educator come out to instruct the patient and her family - likely tomorrow.     Outside appointments while in ARU: none    DME:none    Will continue to follow for additional discharge needs.    Electronically signed by Terrie Johnson RN on 7/2/2024 at 3:51 PM

## 2024-07-03 VITALS
DIASTOLIC BLOOD PRESSURE: 84 MMHG | TEMPERATURE: 98.1 F | SYSTOLIC BLOOD PRESSURE: 135 MMHG | HEIGHT: 62 IN | RESPIRATION RATE: 17 BRPM | BODY MASS INDEX: 35.75 KG/M2 | OXYGEN SATURATION: 98 % | WEIGHT: 194.3 LBS | HEART RATE: 70 BPM

## 2024-07-03 PROCEDURE — 6370000000 HC RX 637 (ALT 250 FOR IP): Performed by: INTERNAL MEDICINE

## 2024-07-03 PROCEDURE — 2580000003 HC RX 258: Performed by: STUDENT IN AN ORGANIZED HEALTH CARE EDUCATION/TRAINING PROGRAM

## 2024-07-03 PROCEDURE — 6370000000 HC RX 637 (ALT 250 FOR IP): Performed by: PSYCHIATRY & NEUROLOGY

## 2024-07-03 PROCEDURE — 97130 THER IVNTJ EA ADDL 15 MIN: CPT

## 2024-07-03 PROCEDURE — 97530 THERAPEUTIC ACTIVITIES: CPT

## 2024-07-03 PROCEDURE — 97110 THERAPEUTIC EXERCISES: CPT

## 2024-07-03 PROCEDURE — 2580000003 HC RX 258: Performed by: NURSE PRACTITIONER

## 2024-07-03 PROCEDURE — 6370000000 HC RX 637 (ALT 250 FOR IP): Performed by: NURSE PRACTITIONER

## 2024-07-03 PROCEDURE — 97129 THER IVNTJ 1ST 15 MIN: CPT

## 2024-07-03 PROCEDURE — 6360000002 HC RX W HCPCS: Performed by: PHYSICAL MEDICINE & REHABILITATION

## 2024-07-03 PROCEDURE — 6360000002 HC RX W HCPCS: Performed by: STUDENT IN AN ORGANIZED HEALTH CARE EDUCATION/TRAINING PROGRAM

## 2024-07-03 RX ORDER — BLOOD-GLUCOSE SENSOR
EACH MISCELLANEOUS
Qty: 2 EACH | Refills: 11 | Status: SHIPPED | OUTPATIENT
Start: 2024-07-03

## 2024-07-03 RX ADMIN — BUSPIRONE HYDROCHLORIDE 10 MG: 5 TABLET ORAL at 09:07

## 2024-07-03 RX ADMIN — DIVALPROEX SODIUM 250 MG: 250 TABLET, DELAYED RELEASE ORAL at 09:09

## 2024-07-03 RX ADMIN — ACETAMINOPHEN 650 MG: 325 TABLET ORAL at 04:43

## 2024-07-03 RX ADMIN — CEFTRIAXONE SODIUM 2000 MG: 2 INJECTION, POWDER, FOR SOLUTION INTRAMUSCULAR; INTRAVENOUS at 05:19

## 2024-07-03 RX ADMIN — METOPROLOL 100 MG: 100 TABLET ORAL at 09:07

## 2024-07-03 RX ADMIN — DICLOFENAC SODIUM 2 G: 10 GEL TOPICAL at 04:51

## 2024-07-03 RX ADMIN — VENLAFAXINE HYDROCHLORIDE 75 MG: 75 CAPSULE, EXTENDED RELEASE ORAL at 09:08

## 2024-07-03 RX ADMIN — INSULIN GLARGINE 30 UNITS: 100 INJECTION, SOLUTION SUBCUTANEOUS at 09:08

## 2024-07-03 RX ADMIN — ANTI-FUNGAL POWDER MICONAZOLE NITRATE TALC FREE: 1.42 POWDER TOPICAL at 09:12

## 2024-07-03 RX ADMIN — ASPIRIN 81 MG: 81 TABLET, COATED ORAL at 09:07

## 2024-07-03 RX ADMIN — HYDRALAZINE HYDROCHLORIDE 50 MG: 50 TABLET ORAL at 05:26

## 2024-07-03 RX ADMIN — PANTOPRAZOLE SODIUM 40 MG: 40 TABLET, DELAYED RELEASE ORAL at 05:26

## 2024-07-03 RX ADMIN — SODIUM CHLORIDE, PRESERVATIVE FREE 10 ML: 5 INJECTION INTRAVENOUS at 09:10

## 2024-07-03 RX ADMIN — Medication 400 MG: at 09:07

## 2024-07-03 RX ADMIN — LISINOPRIL 20 MG: 20 TABLET ORAL at 09:07

## 2024-07-03 RX ADMIN — SPIRONOLACTONE 25 MG: 25 TABLET ORAL at 09:07

## 2024-07-03 RX ADMIN — INSULIN LISPRO 2 UNITS: 100 INJECTION, SOLUTION INTRAVENOUS; SUBCUTANEOUS at 12:00

## 2024-07-03 RX ADMIN — Medication 1 CAPSULE: at 09:07

## 2024-07-03 RX ADMIN — ENOXAPARIN SODIUM 40 MG: 100 INJECTION SUBCUTANEOUS at 09:08

## 2024-07-03 ASSESSMENT — PAIN SCALES - GENERAL
PAINLEVEL_OUTOF10: 5
PAINLEVEL_OUTOF10: 5

## 2024-07-03 ASSESSMENT — PAIN DESCRIPTION - DESCRIPTORS
DESCRIPTORS: ACHING;DISCOMFORT;GNAWING;SORE
DESCRIPTORS: ACHING;DISCOMFORT;GNAWING;SORE

## 2024-07-03 ASSESSMENT — PAIN DESCRIPTION - ORIENTATION
ORIENTATION: RIGHT;POSTERIOR
ORIENTATION: RIGHT;POSTERIOR

## 2024-07-03 ASSESSMENT — PAIN DESCRIPTION - LOCATION
LOCATION: NECK;SHOULDER
LOCATION: NECK;SHOULDER

## 2024-07-03 NOTE — PROGRESS NOTES
[facility] -   Infectious diseases evaluation  admission date 6/14/2024    reason for consultation:   Mastoiditis    Impression :   Current:  Haemophilus influenza meningitis/ bacteremia  Sinusitis  Suspect mastoiditis  Acute /subacute infarct in the left posterior frontal lobe   Diabetes mellitus  Chronic kidney disease  Hyperlipidemia  Hypertension  GERD  DIEGO        HENCE:   Continue Ceftriaxone 2 gm daily 7/10/2024   No intervention recommend by ENT  Follow-up with ENT on discharge  Follow CBC and renal function  Supportive care.        Infection Control Recommendations   [place] Precautions      Antimicrobial Stewardship Recommendations   Simplification of therapy  Targeted therapy      History of Present Illness:   Initial history:  Kavya De Santiago is a 64 y.o.-year-old female was seen at [facility] for mastoiditis.  The patient was a admitted initially to [facility] with altered mental status associated with nausea, vomiting and diarrhea.   According to her  she had congestion, headache and rhinorrhea for 1 week prior to admission associated with dry cough and decreased appetite.  The patient was found on the ground on the day of admission, EMS called.  The patient was intubated at the ER.  The patient was hypothermic initially then was running high-grade fever with temperature max of 105, hypotensive, required pressors.  CT head showed no acute abnormality, CTA chest showed no evidence of PE, CT abdomen pelvis showed pancolitis with abnormal wall thickening of the ascending, transverse, descending colon, sigmoid colon and rectum.   Initial lactic acid 3.3, WBC 11.5, procalcitonin 11.4  COVID-19 influenza combo negative.  Blood cultures grew Haemophilus influenzae sensitivity  sensitive to ampicillin, ceftriaxone with RADHA of <= 0.06 and meropenem  Status post lumbar puncture 5/29, cloudy fluid, 280 WBC, meningitis panel 
          [facility] -   Infectious diseases evaluation  admission date 6/14/2024    reason for consultation:   Mastoiditis    Impression :   Current:  Haemophilus influenza meningitis/ bacteremia  Sinusitis  Suspect mastoiditis  Acute /subacute infarct in the left posterior frontal lobe   Diabetes mellitus  Chronic kidney disease  Hyperlipidemia  Hypertension  GERD  DIEGO        HENCE:   Continue Ceftriaxone 2 gm daily 7/10/2024   No intervention recommend by ENT  Follow-up with ENT on discharge  Follow CBC and renal function  Supportive care.        Infection Control Recommendations   [place] Precautions      Antimicrobial Stewardship Recommendations   Simplification of therapy  Targeted therapy      History of Present Illness:   Initial history:  Kavya De Santiago is a 64 y.o.-year-old female was seen at [facility] for mastoiditis.  The patient was a admitted initially to [facility] with altered mental status associated with nausea, vomiting and diarrhea.   According to her  she had congestion, headache and rhinorrhea for 1 week prior to admission associated with dry cough and decreased appetite.  The patient was found on the ground on the day of admission, EMS called.  The patient was intubated at the ER.  The patient was hypothermic initially then was running high-grade fever with temperature max of 105, hypotensive, required pressors.  CT head showed no acute abnormality, CTA chest showed no evidence of PE, CT abdomen pelvis showed pancolitis with abnormal wall thickening of the ascending, transverse, descending colon, sigmoid colon and rectum.   Initial lactic acid 3.3, WBC 11.5, procalcitonin 11.4  COVID-19 influenza combo negative.  Blood cultures grew Haemophilus influenzae sensitivity  sensitive to ampicillin, ceftriaxone with RADHA of <= 0.06 and meropenem  Status post lumbar puncture 5/29, cloudy fluid, 280 WBC, meningitis panel 
          Pharmacy Note     Enoxaparin Dose Adjustment    Kavya De Santiago is a 64 y.o. female. Pharmacist assessment of enoxaparin dose for VTE prophylaxis.    Recent Labs     06/13/24  0524 06/14/24  0601   BUN 5* 4*       Recent Labs     06/13/24  0524 06/14/24  0601   CREATININE 0.7 0.7       Estimated Creatinine Clearance: 86 mL/min (based on SCr of 0.7 mg/dL).  Estimated CrCl using Ideal Body Weight: 65 mL/min (based on IBW 50.1 kg)    Height:   Ht Readings from Last 1 Encounters:   05/28/24 1.575 m (5' 2\")     Weight:  Wt Readings from Last 1 Encounters:   06/14/24 91.4 kg (201 lb 8 oz)       The following enoxaparin dose has been adjusted based upon renal function and/or patient weight per P&T Guidelines:    Per patient's weight 91.4 kg and creatinine clearance of 86 mL/min. Lovenox was adjusted from 30 mg BID to 40 mg daily.    Dose has been adjusted per protocol.     Thank you,  Benny Frost, [facility]  6/14/2024   11:04 PM  
     Nutrition Note      Briefly reviewed basic carbohydrate control diet guidelines wt pt. Reviewed tips on treating low glucose levels, should they occur. Pt declined need for additional instruction.    Leanne Jerome R.D., L.D.  Phone: [phone]      
    [facility]   IN-PATIENT SERVICE   [facility]    Consult note            Date:   6/30/2024  Patient name:  Kavya De Santiago  Date of admission:  6/14/2024  8:59 PM  MRN:   [id number]  Account:  [id number]  YOB: 1960  PCP:    Shaina Hamilton MD  Room:   [address] [place] [address]  Code Status:    Full Code    Chief Complaint:     No chief complaint on file.  Debility    History Obtained From:     patient    History of Present Illness:     64-year-old female with extensive stay in ICU with acute hypoxic Respiratory failure septic shock 2/2 H. influenzae bacteremia and meningitis, CSF studies positive, CT scan of abdomen also showed colitis, patient received ceftriaxone IV, and presumed C. difficile infection 10-day course of oral vancomycin.  During ICU stay,  also found that patient has acute stroke on MRI, EMANUEL was ordered with concerns for vegetation, EMANUEL negative for any vegetation, MRI also showed mastoiditis, ID was on board throughout, patient is to remain on IV ceftriaxone till 07/12/2024 for 6-week course of mastoiditis.     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      an episode of 30 runs of V. tach, patient remained asymptomatic, cardiology was taken on board, patient potassium and magnesium corrected, she also underwent stress test which is low risk.    Currently admitted at [facility] for further care    Past Medical History:     Past Medical History:   Diagnosis Date    Cervical spondylosis 04/2009    c-4 c-5, c-5 c-6, c-6 c-7    CKD (chronic kidney disease)     per pt, does not have nephrologist    COVID-19 12/06/2021    nasal congestion, sherry 
    [facility]   IN-PATIENT SERVICE   [facility]    Consult note            Date:   6/19/2024  Patient name:  Kavya De Santiago  Date of admission:  6/14/2024  8:59 PM  MRN:   [id number]  Account:  [id number]  YOB: 1960  PCP:    Shaina Hamilton MD  Room:   [address] [place] [address]  Code Status:    Full Code    Chief Complaint:     No chief complaint on file.      History Obtained From:     patient    History of Present Illness:     64-year-old female with extensive stay in ICU with acute hypoxic Respiratory failure septic shock 2/2 H. influenzae bacteremia and meningitis, CSF studies positive, CT scan of abdomen also showed colitis, patient received ceftriaxone IV, and presumed C. difficile infection 10-day course of oral vancomycin.  During ICU stay,  also found that patient has acute stroke on MRI, EMANUEL was ordered with concerns for vegetation, EMANUEL negative for any vegetation, MRI also showed mastoiditis, ID was on board throughout, patient is to remain on IV ceftriaxone till 07/12/2024 for 6-week course of mastoiditis.     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      an episode of 30 runs of V. tach, patient remained asymptomatic, cardiology was taken on board, patient potassium and magnesium corrected, she also underwent stress test which is low risk.    Currently admitted at [facility] for further care    Past Medical History:     Past Medical History:   Diagnosis Date    Cervical spondylosis 04/2009    c-4 c-5, c-5 c-6, c-6 c-7    CKD (chronic kidney disease)     per pt, does not have nephrologist    COVID-19 12/06/2021    nasal congestion, fatique and 
    [facility]   IN-PATIENT SERVICE   [facility]    Consult note            Date:   7/1/2024  Patient name:  Kavya De Santiago  Date of admission:  6/14/2024  8:59 PM  MRN:   [id number]  Account:  [id number]  YOB: 1960  PCP:    Shaina Hamilton MD  Room:   [address] [place] [address]  Code Status:    Full Code    Chief Complaint:     No chief complaint on file.  Debility    History Obtained From:     patient    History of Present Illness:     64-year-old female with extensive stay in ICU with acute hypoxic Respiratory failure septic shock 2/2 H. influenzae bacteremia and meningitis, CSF studies positive, CT scan of abdomen also showed colitis, patient received ceftriaxone IV, and presumed C. difficile infection 10-day course of oral vancomycin.  During ICU stay,  also found that patient has acute stroke on MRI, EMANUEL was ordered with concerns for vegetation, EMANUEL negative for any vegetation, MRI also showed mastoiditis, ID was on board throughout, patient is to remain on IV ceftriaxone till 07/12/2024 for 6-week course of mastoiditis.     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      an episode of 30 runs of V. tach, patient remained asymptomatic, cardiology was taken on board, patient potassium and magnesium corrected, she also underwent stress test which is low risk.    Currently admitted at [facility] for further care    Past Medical History:     Past Medical History:   Diagnosis Date    Cervical spondylosis 04/2009    c-4 c-5, c-5 c-6, c-6 c-7    CKD (chronic kidney disease)     per pt, does not have nephrologist    COVID-19 12/06/2021    nasal congestion, fatique and 
    [facility]   IN-PATIENT SERVICE   [facility]    Consult note            Date:   6/18/2024  Patient name:  Kavya De Santiago  Date of admission:  6/14/2024  8:59 PM  MRN:   [id number]  Account:  [id number]  YOB: 1960  PCP:    Shaina Hamilton MD  Room:   [address] [place] [address]  Code Status:    Prior    Chief Complaint:     No chief complaint on file.      History Obtained From:     patient    History of Present Illness:     64-year-old female with extensive stay in ICU with acute hypoxic Respiratory failure septic shock 2/2 H. influenzae bacteremia and meningitis, CSF studies positive, CT scan of abdomen also showed colitis, patient received ceftriaxone IV, and presumed C. difficile infection 10-day course of oral vancomycin.  During ICU stay,  also found that patient has acute stroke on MRI, EMANUEL was ordered with concerns for vegetation, EMANUEL negative for any vegetation, MRI also showed mastoiditis, ID was on board throughout, patient is to remain on IV ceftriaxone till 07/12/2024 for 6-week course of mastoiditis.     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      an episode of 30 runs of V. tach, patient remained asymptomatic, cardiology was taken on board, patient potassium and magnesium corrected, she also underwent stress test which is low risk.    Currently admitted at [facility] for further care    Past Medical History:     Past Medical History:   Diagnosis Date    Cervical spondylosis 04/2009    c-4 c-5, c-5 c-6, c-6 c-7    CKD (chronic kidney disease)     per pt, does not have nephrologist    COVID-19 12/06/2021    nasal congestion, fatique and myalgia x 
    [facility]   IN-PATIENT SERVICE   [facility]    Consult note            Date:   6/17/2024  Patient name:  Kavya De Santiago  Date of admission:  6/14/2024  8:59 PM  MRN:   [id number]  Account:  [id number]  YOB: 1960  PCP:    Shaina Hamilton MD  Room:   [address] [place] [address]  Code Status:    Prior    Chief Complaint:     No chief complaint on file.      History Obtained From:     patient    History of Present Illness:     64-year-old female with extensive stay in ICU with acute hypoxic Respiratory failure septic shock 2/2 H. influenzae bacteremia and meningitis, CSF studies positive, CT scan of abdomen also showed colitis, patient received ceftriaxone IV, and presumed C. difficile infection 10-day course of oral vancomycin.  During ICU stay,  also found that patient has acute stroke on MRI, EMANUEL was ordered with concerns for vegetation, EMANUEL negative for any vegetation, MRI also showed mastoiditis, ID was on board throughout, patient is to remain on IV ceftriaxone till 07/12/2024 for 6-week course of mastoiditis.     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      an episode of 30 runs of V. tach, patient remained asymptomatic, cardiology was taken on board, patient potassium and magnesium corrected, she also underwent stress test which is low risk.    Currently admitted at [facility] for further care    Past Medical History:     Past Medical History:   Diagnosis Date    Cervical spondylosis 04/2009    c-4 c-5, c-5 c-6, c-6 c-7    CKD (chronic kidney disease)     per pt, does not have nephrologist    COVID-19 12/06/2021    nasal congestion, fatique and myalgia x 
    [facility]   IN-PATIENT SERVICE   [facility]    Consult note            Date:   6/24/2024  Patient name:  Kavya De Santiago  Date of admission:  6/14/2024  8:59 PM  MRN:   [id number]  Account:  [id number]  YOB: 1960  PCP:    Shaina Hamilton MD  Room:   [address] [place] [address]  Code Status:    Full Code    Chief Complaint:     No chief complaint on file.  Debility    History Obtained From:     patient    History of Present Illness:     64-year-old female with extensive stay in ICU with acute hypoxic Respiratory failure septic shock 2/2 H. influenzae bacteremia and meningitis, CSF studies positive, CT scan of abdomen also showed colitis, patient received ceftriaxone IV, and presumed C. difficile infection 10-day course of oral vancomycin.  During ICU stay,  also found that patient has acute stroke on MRI, EMANUEL was ordered with concerns for vegetation, EMANUEL negative for any vegetation, MRI also showed mastoiditis, ID was on board throughout, patient is to remain on IV ceftriaxone till 07/12/2024 for 6-week course of mastoiditis.     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      an episode of 30 runs of V. tach, patient remained asymptomatic, cardiology was taken on board, patient potassium and magnesium corrected, she also underwent stress test which is low risk.    Currently admitted at [facility] for further care    Past Medical History:     Past Medical History:   Diagnosis Date    Cervical spondylosis 04/2009    c-4 c-5, c-5 c-6, c-6 c-7    CKD (chronic kidney disease)     per pt, does not have nephrologist    COVID-19 12/06/2021    nasal congestion, sherry 
    [facility]   IN-PATIENT SERVICE   [facility]    Consult note            Date:   6/22/2024  Patient name:  Kavya De Santiago  Date of admission:  6/14/2024  8:59 PM  MRN:   [id number]  Account:  [id number]  YOB: 1960  PCP:    Shaina Hamilton MD  Room:   [address] [place] [address]  Code Status:    Full Code    Chief Complaint:     No chief complaint on file.      History Obtained From:     patient    History of Present Illness:     64-year-old female with extensive stay in ICU with acute hypoxic Respiratory failure septic shock 2/2 H. influenzae bacteremia and meningitis, CSF studies positive, CT scan of abdomen also showed colitis, patient received ceftriaxone IV, and presumed C. difficile infection 10-day course of oral vancomycin.  During ICU stay,  also found that patient has acute stroke on MRI, EMANUEL was ordered with concerns for vegetation, EMANUEL negative for any vegetation, MRI also showed mastoiditis, ID was on board throughout, patient is to remain on IV ceftriaxone till 07/12/2024 for 6-week course of mastoiditis.     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      an episode of 30 runs of V. tach, patient remained asymptomatic, cardiology was taken on board, patient potassium and magnesium corrected, she also underwent stress test which is low risk.    Currently admitted at [facility] for further care    Past Medical History:     Past Medical History:   Diagnosis Date    Cervical spondylosis 04/2009    c-4 c-5, c-5 c-6, c-6 c-7    CKD (chronic kidney disease)     per pt, does not have nephrologist    COVID-19 12/06/2021    nasal congestion, fatique and 
    [facility]   IN-PATIENT SERVICE   [facility]    Consult note            Date:   6/20/2024  Patient name:  Kavya DeS antiago  Date of admission:  6/14/2024  8:59 PM  MRN:   [id number]  Account:  [id number]  YOB: 1960  PCP:    Shaina Hamilton MD  Room:   [address] [place] [address]  Code Status:    Full Code    Chief Complaint:     No chief complaint on file.      History Obtained From:     patient    History of Present Illness:     64-year-old female with extensive stay in ICU with acute hypoxic Respiratory failure septic shock 2/2 H. influenzae bacteremia and meningitis, CSF studies positive, CT scan of abdomen also showed colitis, patient received ceftriaxone IV, and presumed C. difficile infection 10-day course of oral vancomycin.  During ICU stay,  also found that patient has acute stroke on MRI, EMANUEL was ordered with concerns for vegetation, EMANUEL negative for any vegetation, MRI also showed mastoiditis, ID was on board throughout, patient is to remain on IV ceftriaxone till 07/12/2024 for 6-week course of mastoiditis.     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      an episode of 30 runs of V. tach, patient remained asymptomatic, cardiology was taken on board, patient potassium and magnesium corrected, she also underwent stress test which is low risk.    Currently admitted at [facility] for further care    Past Medical History:     Past Medical History:   Diagnosis Date    Cervical spondylosis 04/2009    c-4 c-5, c-5 c-6, c-6 c-7    CKD (chronic kidney disease)     per pt, does not have nephrologist    COVID-19 12/06/2021    nasal congestion, fatique and 
    [facility]   IN-PATIENT SERVICE   [facility]    Consult note            Date:   6/26/2024  Patient name:  Kavya De Santiago  Date of admission:  6/14/2024  8:59 PM  MRN:   [id number]  Account:  [id number]  YOB: 1960  PCP:    Shaina Hamilton MD  Room:   [address] [place] [address]  Code Status:    Full Code    Chief Complaint:     No chief complaint on file.  Debility    History Obtained From:     patient    History of Present Illness:     64-year-old female with extensive stay in ICU with acute hypoxic Respiratory failure septic shock 2/2 H. influenzae bacteremia and meningitis, CSF studies positive, CT scan of abdomen also showed colitis, patient received ceftriaxone IV, and presumed C. difficile infection 10-day course of oral vancomycin.  During ICU stay,  also found that patient has acute stroke on MRI, EMANUEL was ordered with concerns for vegetation, EMANUEL negative for any vegetation, MRI also showed mastoiditis, ID was on board throughout, patient is to remain on IV ceftriaxone till 07/12/2024 for 6-week course of mastoiditis.     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      an episode of 30 runs of V. tach, patient remained asymptomatic, cardiology was taken on board, patient potassium and magnesium corrected, she also underwent stress test which is low risk.    Currently admitted at [facility] for further care    Past Medical History:     Past Medical History:   Diagnosis Date    Cervical spondylosis 04/2009    c-4 c-5, c-5 c-6, c-6 c-7    CKD (chronic kidney disease)     per pt, does not have nephrologist    COVID-19 12/06/2021    nasal congestion, sherry 
    [facility]   IN-PATIENT SERVICE   [facility]    Consult note            Date:   6/21/2024  Patient name:  Kavya De Santiago  Date of admission:  6/14/2024  8:59 PM  MRN:   [id number]  Account:  [id number]  YOB: 1960  PCP:    Shaina Hamilton MD  Room:   [address] [place] [address]  Code Status:    Full Code    Chief Complaint:     No chief complaint on file.      History Obtained From:     patient    History of Present Illness:     64-year-old female with extensive stay in ICU with acute hypoxic Respiratory failure septic shock 2/2 H. influenzae bacteremia and meningitis, CSF studies positive, CT scan of abdomen also showed colitis, patient received ceftriaxone IV, and presumed C. difficile infection 10-day course of oral vancomycin.  During ICU stay,  also found that patient has acute stroke on MRI, EMANUEL was ordered with concerns for vegetation, EMANUEL negative for any vegetation, MRI also showed mastoiditis, ID was on board throughout, patient is to remain on IV ceftriaxone till 07/12/2024 for 6-week course of mastoiditis.     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      an episode of 30 runs of V. tach, patient remained asymptomatic, cardiology was taken on board, patient potassium and magnesium corrected, she also underwent stress test which is low risk.    Currently admitted at [facility] for further care    Past Medical History:     Past Medical History:   Diagnosis Date    Cervical spondylosis 04/2009    c-4 c-5, c-5 c-6, c-6 c-7    CKD (chronic kidney disease)     per pt, does not have nephrologist    COVID-19 12/06/2021    nasal congestion, fatique and 
    [facility]   IN-PATIENT SERVICE   [facility]    Consult note            Date:   6/23/2024  Patient name:  Kavya De Santiago  Date of admission:  6/14/2024  8:59 PM  MRN:   [id number]  Account:  [id number]  YOB: 1960  PCP:    Shaina Hamilton MD  Room:   [address] [place] [address]  Code Status:    Full Code    Chief Complaint:     No chief complaint on file.      History Obtained From:     patient    History of Present Illness:     64-year-old female with extensive stay in ICU with acute hypoxic Respiratory failure septic shock 2/2 H. influenzae bacteremia and meningitis, CSF studies positive, CT scan of abdomen also showed colitis, patient received ceftriaxone IV, and presumed C. difficile infection 10-day course of oral vancomycin.  During ICU stay,  also found that patient has acute stroke on MRI, EMANUEL was ordered with concerns for vegetation, EMANUEL negative for any vegetation, MRI also showed mastoiditis, ID was on board throughout, patient is to remain on IV ceftriaxone till 07/12/2024 for 6-week course of mastoiditis.     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      an episode of 30 runs of V. tach, patient remained asymptomatic, cardiology was taken on board, patient potassium and magnesium corrected, she also underwent stress test which is low risk.    Currently admitted at [facility] for further care    Past Medical History:     Past Medical History:   Diagnosis Date    Cervical spondylosis 04/2009    c-4 c-5, c-5 c-6, c-6 c-7    CKD (chronic kidney disease)     per pt, does not have nephrologist    COVID-19 12/06/2021    nasal congestion, fatique and 
    [facility]   IN-PATIENT SERVICE   [facility]    Consult note            Date:   6/25/2024  Patient name:  Kavya De Santiago  Date of admission:  6/14/2024  8:59 PM  MRN:   [id number]  Account:  [id number]  YOB: 1960  PCP:    Shaina Hamilton MD  Room:   [address] [place] [address]  Code Status:    Full Code    Chief Complaint:     No chief complaint on file.  Debility    History Obtained From:     patient    History of Present Illness:     64-year-old female with extensive stay in ICU with acute hypoxic Respiratory failure septic shock 2/2 H. influenzae bacteremia and meningitis, CSF studies positive, CT scan of abdomen also showed colitis, patient received ceftriaxone IV, and presumed C. difficile infection 10-day course of oral vancomycin.  During ICU stay,  also found that patient has acute stroke on MRI, EMANUEL was ordered with concerns for vegetation, EMANUEL negative for any vegetation, MRI also showed mastoiditis, ID was on board throughout, patient is to remain on IV ceftriaxone till 07/12/2024 for 6-week course of mastoiditis.     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      an episode of 30 runs of V. tach, patient remained asymptomatic, cardiology was taken on board, patient potassium and magnesium corrected, she also underwent stress test which is low risk.    Currently admitted at [facility] for further care    Past Medical History:     Past Medical History:   Diagnosis Date    Cervical spondylosis 04/2009    c-4 c-5, c-5 c-6, c-6 c-7    CKD (chronic kidney disease)     per pt, does not have nephrologist    COVID-19 12/06/2021    nasal congestion, sherry 
   06/18/24 1115   Encounter Summary   Encounter Overview/Reason Attempted Encounter   Service Provided For Patient not available  (Patient with OT.)   Referral/Consult From Rounding   Last Encounter  06/18/24   Assessment/Intervention/Outcome   Assessment Unable to assess  (Patient unavailable.)       
   06/27/24 1100   Encounter Summary   Encounter Overview/Reason Attempted Encounter   Service Provided For Patient not available  (Patient OOR)   Referral/Consult From Rounding   Last Encounter  06/27/24   Spiritual/Emotional needs   Type Spiritual Support   Assessment/Intervention/Outcome   Assessment Unable to assess  (Patient unavailable.)       
  ACUTE INPATIENT REHABILITATION  [facility]    Case Management Assessment: IRF ALIZA 4.0     Patient Health Questionnaire-9 (PHQ-2 to 9)   Over the last 2 weeks, how often have you been bothered by any of the following problems?    1. Little Interest or pleasure in doing things?   Never or 1 Day - Score 0    2. Feeling down, depressed or hopeless?   Never or 1 Day - Score 0    3. Trouble falling or staying asleep, or sleeping too much?   Never or 1 Day - Score 0    4. Feeling tired or having little energy?   Never or 1 Day - Score 0    5. Poor appetite or overeating?   Never or 1 Day - Score 0    6. Feeling bad about yourself-or that you are a failure or have let yourself or your family down?   Never or 1 Day - Score 0    7. Trouble concentrating on things, such as reading the newspaper or watching television?    Never or 1 Day - Score 0    8. Moving or speaking so slowly that other people could have noticed? Or the opposite-being so fidgety or restless that you have been moving around a lot more than usual?  Never or 1 Day - Score 0    9. Thoughts that you would be better off dead or of hurting yourself in some way?   Never or 1 Day - Score 0    Total Score: 0  If score is above 15, Notify PM&R Physician    If you checked off any problems, how difficult have these problems made it for you to do your work, take care of things at home, or get along with other people?    Not Applicable - No Problems Identified         Social Isolation     How often do you feel lonely or isolated from those around you?  Never       Access To Transportation     2.In the past six months to a year, has lack of transportation kept you from medical appointments or from getting your medications?”     No    3.In the past six months to a year, has lack of transportation kept you from non-medical meetings, appointments, work, or from getting things that you need?     No    The responses to access to transportation remain applicable for 
  ACUTE INPATIENT REHABILITATION ADMISSION  [facility]    Patient Name: Kavya De Santiago  MRN: [id number]   Date of Admit: 6/14/2024  Time of Arrival: 2030    Patient admitted to the Acute Inpatient Rehabilitation Unit via Wheelchair    Patient oriented to room, unit and fall prevention safety measures.  Education provided on the rehabilitation routine and therapy schedules.    Drug / Medication Regimen Review   Admitting medication orders compared with acute stay medications; home medication list reviewed with patient/family.      Medication Issues Identified ? Yes If Yes, Check All That Apply   [x]  Allergy to medication   []  Drug interactions (drug/drug, drug/food, drug/disease interactions)   []  Duplicate drug   []  Omission (drug missing from prescribed regimen)   []  Non adherence   []  Adverse reaction   []  Wrong patient, drug, dose, route, time error   []  Ineffective drug therapy       High-Risk Drug Classes: Use and Indication   Check if the patient is taking any medications by pharmacological classification If yes, check if there is an indication noted for all meds in the drug class   Antipsychotic No If yes: indication noted?  [] If no indication noted, follow up with provider for order clarification   Anticoagulant Yes If yes: indication noted?  []    Antibiotic Yes If yes: indication noted?  []    Opioid No If yes: indication noted?  []    Antiplatelet Yes If yes: indication noted?  []    Hypoglycemic (Including Insulin) Yes If yes: indication noted?  []      Attending Physical Medicine & Rehabilitation (PM&R) Admitting Order Review   Admission orders reviewed with Acute Inpatient Rehabilitation Attending PM&R Physician: Vicky Hernandez MD     Skin Assessment   Skin assessment performed by two nurses: all active alterations in skin integrity, wounds, lines, drains and airways assessed, measured, recorded and reconciled. Refer to LDA avatar and LDA flowsheet for additional 
  ACUTE INPATIENT REHABILITATION DISCHARGE  [facility]    Patient Name: Kavya De Santiago  MRN: [id number]     Patient discharged in stable condition as per order of attending physician.     AVS provided by nurse at time of discharge, which includes all necessary medical information pertaining to the patients current course of illness, treatment, medications, post-discharge goals of care, and treatment preferences.     Provision of Current Reconciled Medication List to Patient at Discharge   Indicate the route(s) of transmission of the current reconciled medication list to the patient/family/caregiver. Paper Based (e.g. fax, copies, print outs)     Availability of \"My Chart\" offered to patient as a tool for updated health record.  Steps for activation discussed with patient as mentioned on AVS.      Patient/responsible party verbalize understanding of discharge plan and are in agreement with goal/plan/treatment preferences.     Belongings including Glasses, cell phone, , other belonging  sent with patient/responsible party.      Home medications sent home with patient/responsible party no    Car Transfer   Level of assistance required for patient transfer into vehicle:   INDEPENDENT: Patient completes the activity by him/herself with no assistance from a helper     High-Risk Drug Classes: Use and Indication   Check if the patient is taking any medications by pharmacological classification If yes, check if there is an indication noted for all meds in the drug class   Antipsychotic Yes If yes: indication noted?  [] If no indication noted, follow up with provider for order clarification   Anticoagulant Yes If yes: indication noted?  []    Antibiotic Yes If yes: indication noted?  []    Opioid No If yes: indication noted?  []    Antiplatelet Yes If yes: indication noted?  []    Hypoglycemic (Including Insulin) Yes If yes: indication noted?  []        Pain Assessment   Over the past 5 days, how much of 
  [facility]rology   IN-PATIENT SERVICE      NEUROLOGY PROGRESS  NOTE            Date:   6/21/2024  Patient name:  Kavya De Santiago  Date of admission:  6/14/2024  YOB: 1960      Interval History:     6/21: Reports headache overall improved.  Currently rates it at 5/10.  No new neurological symptoms.  MRI and MRV completed.    6/20: Reports headache improved today although still rated 6/10.  She is also complaining of associated pulsatile tinnitus along with ear fullness bilaterally.  No visual changes including blurry vision, diplopia.    History of Present Illness:     64-year-old female with past medical history of hypertension, hyperlipidemia, diabetes, recent bacterial meningitis, who is currently admitted in acute rehab.  Neurology consulted for headaches.  History obtained from patient and chart review.     Patient is known to our service.  She was recently hospitalized from 5/26 to 6/14/2024.  She was found to have haemophilus meningitis and septicemia.  Was followed by neurology service.  Most recently, was seen by me on 6/9/2024.  Please refer to prior neurology notes for further history.     She was discharged to acute rehab.  Since admission to rehab, her mentation has remained at baseline.  However, she states she continues to have headaches since her diagnosis of meningitis.  Describes headache as bifrontal and retro-orbital predominant, throbbing pain that is constant.  There is no associated positional component.  She has associated photophobia, nausea, no phonophobia.  She has been taking Tylenol and Roxicodone without significant relief.  There are no associated visual changes.  No focal weakness, numbness.  She states she has a history of migraines but these subsided about 20 years ago.  Current headache is similar phenotype to migraine.    Past Medical History:     Past Medical History:   Diagnosis Date    Cervical spondylosis 04/2009    c-4 c-5, c-5 c-6, c-6 c-7    CKD 
  [facility] Neurology   IN-PATIENT SERVICE      NEUROLOGY PROGRESS  NOTE            Date:   6/22/2024  Patient name:  Kavya De Santiago  Date of admission:  6/14/2024  YOB: 1960      Interval History:     6/22: Headache continues to improve, states today is the best day she has had in a while.  Currently rates it at 3/10.  No new neurological symptoms.    6/21: Reports headache overall improved.  Currently rates it at 5/10.  No new neurological symptoms.  MRI and MRV completed.    6/20: Reports headache improved today although still rated 6/10.  She is also complaining of associated pulsatile tinnitus along with ear fullness bilaterally.  No visual changes including blurry vision, diplopia.    History of Present Illness:     64-year-old female with past medical history of hypertension, hyperlipidemia, diabetes, recent bacterial meningitis, who is currently admitted in acute rehab.  Neurology consulted for headaches.  History obtained from patient and chart review.     Patient is known to our service.  She was recently hospitalized from 5/26 to 6/14/2024.  She was found to have haemophilus meningitis and septicemia.  Was followed by neurology service.  Most recently, was seen by me on 6/9/2024.  Please refer to prior neurology notes for further history.     She was discharged to acute rehab.  Since admission to rehab, her mentation has remained at baseline.  However, she states she continues to have headaches since her diagnosis of meningitis.  Describes headache as bifrontal and retro-orbital predominant, throbbing pain that is constant.  There is no associated positional component.  She has associated photophobia, nausea, no phonophobia.  She has been taking Tylenol and Roxicodone without significant relief.  There are no associated visual changes.  No focal weakness, numbness.  She states she has a history of migraines but these subsided about 20 years ago.  Current headache is similar phenotype to 
  [facility] Acute Inpatient Rehabilitation  Impairment Group Clarification    Patient Name: Kavya De Santiago     MRN:[id number]     :1960(64 y.o.)    PRINCIPAL DIAGNOSIS: Encephalopathy    Impairment Group Clarified with Physical Medicine & Rehabilitation Physician Dr. Chelsie Haynes.    IMPAIRMENT GROUP: 2.1 Non-Traumatic Brain Dysfunction - Encephalopathy    Established impairment group approved as documented within the medical record of Kavya De Santiago.   
  [facility] Neurology   IN-PATIENT SERVICE      NEUROLOGY PROGRESS  NOTE            Date:   6/23/2024  Patient name:  Kavya De Santiago  Date of admission:  6/14/2024  YOB: 1960      Interval History:     6/23: States headache is almost completely resolved now, may be 2/10.  Remains neurologically stable.  Was working with PT.    6/22: Headache continues to improve, states today is the best day she has had in a while.  Currently rates it at 3/10.  No new neurological symptoms.    6/21: Reports headache overall improved.  Currently rates it at 5/10.  No new neurological symptoms.  MRI and MRV completed.    6/20: Reports headache improved today although still rated 6/10.  She is also complaining of associated pulsatile tinnitus along with ear fullness bilaterally.  No visual changes including blurry vision, diplopia.    History of Present Illness:     64-year-old female with past medical history of hypertension, hyperlipidemia, diabetes, recent bacterial meningitis, who is currently admitted in acute rehab.  Neurology consulted for headaches.  History obtained from patient and chart review.     Patient is known to our service.  She was recently hospitalized from 5/26 to 6/14/2024.  She was found to have haemophilus meningitis and septicemia.  Was followed by neurology service.  Most recently, was seen by me on 6/9/2024.  Please refer to prior neurology notes for further history.     She was discharged to acute rehab.  Since admission to rehab, her mentation has remained at baseline.  However, she states she continues to have headaches since her diagnosis of meningitis.  Describes headache as bifrontal and retro-orbital predominant, throbbing pain that is constant.  There is no associated positional component.  She has associated photophobia, nausea, no phonophobia.  She has been taking Tylenol and Roxicodone without significant relief.  There are no associated visual changes.  No focal weakness, 
 Pt is requesting to have some of her home meds restarted. States has not recievied her nightly Requip 4mg or Latanoprost since she's been here.  MD Gutierrez notified. Pending response.   
AM BS was at 195 using Clifford'  
BS is 195 with Clifford  
BS per krystin 269  
BS reading was 150 with Clifford.  
Blood sugar 166 per patient sandee meter  
Blood sugar 170 via sandee  
Blood sugar 177 per patient sandee meter.  
Blood sugar 181 per Clifford reading.   
Blood sugar 182 via sandee  
Blood sugar 197 per patient sandee meter.        
Blood sugar 229 via sandee  
Blood sugar 259 per patient sandee meter   
Blood sugar 280 via Clifford  
Blood sugar 343 via sandee  
Blood sugar checked via freestyle sandee. Patient blood sugar is 217.   
Blood sugar checked via patients Clifford 294  no coverage required per sliding scale   
Blood sugar for breakfast 192   
Blood sugar from [facility]  
Blood sugar is 274 mg/dl per Clifford reading.  
Bs 171  
Bs 235 with Clifford  
Bs is 282 with Clifford  
Bs reading was 215 with Clifford.  
Bs with Clifford is 150.  
Cardiovascular called to inform patients holter monitor not reading, hasn't read since 6/19/24, writer placed monitor on charge, monitor fully charged. Screen on holter reads \"monitoring in progress\". Writer educated patient on importance of keeping monitor charged.   
[facility]   Acute Rehabilitation Occupational Therapy Daily Treatment Note    Date: 24  Patient Name: Kavya De Santiago       Room: 2608/2608-01  MRN: [id number]  Account: [id number]   : 1960  (64 y.o.) Gender: female       Referring Practitioner: Jose Moran MD  Diagnosis: Subacute infarct left frontal lobe, encephalopathy/meningitis  Additional Pertinent Hx: Pt with history of type 2 diabetes, hypertension hyperlipidemia, CKD, GERD, DIEGO, obesity admitted with change in mental status, nausea vomiting diarrhea.  She required intubation reportedly patient had some upper respiratory symptoms over the past week with dry cough decreased appetite rhinorrhea congestion headache and sinus pain.  She then began to have some diarrhea and vomiting..  EMS noted GCS of 89 decreased O2 sat.  She was intubated CT chest showed no PE CT abdomen pelvis showed pancolitis with abnormal wall thickening of the ascending transverse descending colon sigmoid colon and rectum she was started antibiotic she had elevated glucose influenza was negative. Critical care -acute hypoxic respite failure intubated  extubated , sepsis with H influenza bacteremia and meningitis question colitis has rectal tube, chronic back pain-sees neurology, neurosurgery and pain management Dr. Granados, obstruct sleep apnea no sleep study done never followed up in office nonmorbid obesity COVID-negative. Pt admitted to ARU 24.    Treatment Diagnosis: Impaired self care status    Past Medical History:  has a past medical history of Cervical spondylosis, CKD (chronic kidney disease), COVID-19, Fall, Generalized anxiety disorder, GERD (gastroesophageal reflux disease), History of recent hospitalization, History of swelling of feet, Hyperlipidemia, Hypertension, Left hand weakness, Lumbar radiculopathy, DIEGO (nonalcoholic steatohepatitis), Obesity, Osteoarthritis of left knee, Recurrent major depressive disorder 
Clifford blood sugar per pt 189  
Comprehensive Nutrition Assessment    Type and Reason for Visit:  Reassess    Nutrition Recommendations/Plan:   Continue current diet.   Provide chocolate Glucerna x 2 daily.      Malnutrition Assessment:  Malnutrition Status:  Mild malnutrition (06/16/24 1233)    Context:  Acute Illness     Findings of the 6 clinical characteristics of malnutrition:  Energy Intake:  75% or less of estimated energy requirements for 7 or more days  Weight Loss:   (Large variation in wt since acute admit, may be fluid related? difference in scales?)     Body Fat Loss:  Mild body fat loss Triceps   Muscle Mass Loss:  Mild muscle mass loss Hand (interosseous)  Fluid Accumulation:  Mild Generalized, Extremities   Strength:  Not Performed    Nutrition Assessment:    Pt states that PO intake had been decreased due to not feeling well; bad headaches reported. Was nauseated yesterday. Pt states she is feeling much better today and was able to eat all of lunch. Prefers strawberry Glucerna and would like it decreased to twice daily. Pt seemed to have overly restrictive view of diet indicating that some of items on meal tray she really shouldn't be eating due to diabetes. Explained that carbohydrate choices are tracked and items provided are within her limit.    Nutrition Related Findings:    Mild edema. Glucose of 265 at 11:14 per Clifford on 6/19; 295 at 11:32 today. Decadron, Lantus, Lispro sliding scale. Other labs and meds reviewed. Wound Type: None       Current Nutrition Intake & Therapies:    Average Meal Intake: % (lunch today)  Average Supplements Intake: 51-75%, 26-50%  ADULT DIET; Regular; 4 carb choices (60 gm/meal)  ADULT ORAL NUTRITION SUPPLEMENT; Breakfast, Lunch, Dinner; Diabetic Oral Supplement    Anthropometric Measures:  Height: 157.5 cm (5' 2\")  Ideal Body Weight (IBW): 110 lbs (50 kg)    Admission Body Weight: 89.4 kg (197 lb)  Current Body Weight: 89.4 kg (197 lb), 179.1 % IBW. Weight Source: Bed Scale  Current BMI 
Comprehensive Nutrition Assessment    Type and Reason for Visit:  Reassess    Nutrition Recommendations/Plan:   Continue current diet.  Add Glucerna twice daily.     Malnutrition Assessment:  Malnutrition Status:  Mild malnutrition (06/16/24 1233)    Context:  Acute Illness     Findings of the 6 clinical characteristics of malnutrition:  Energy Intake:  75% or less of estimated energy requirements for 7 or more days  Weight Loss:   (Large variation in wt since acute admit, may be fluid related? difference in scales?)     Body Fat Loss:  Mild body fat loss Triceps   Muscle Mass Loss:  Mild muscle mass loss Hand (interosseous)  Fluid Accumulation:  Mild Generalized, Extremities   Strength:  Not Performed    Nutrition Assessment:    Pt reported eating most of her food and the oral nutritional supplements twice daily. Pt shares liking the hospital food, especially the vegetable soup.    Nutrition Related Findings:    Gluc 178. Trace edema generalized, RUE, LUE; 1+ non-pitting RLE, LLE. BM 7/1. Meds reviewed. Wound Type: None       Current Nutrition Intake & Therapies:    Average Meal Intake: %  Average Supplements Intake: %  ADULT DIET; Regular; 4 carb choices (60 gm/meal)  ADULT ORAL NUTRITION SUPPLEMENT; Lunch, Dinner; Diabetic Oral Supplement    Anthropometric Measures:  Height: 157.5 cm (5' 2\")  Ideal Body Weight (IBW): 110 lbs (50 kg)    Admission Body Weight: 89.4 kg (197 lb)  Current Body Weight: 88.1 kg (194 lb 3.6 oz), 179.1 % IBW. Weight Source: Bed Scale  Current BMI (kg/m2): 35.5  Usual Body Weight: 104.8 kg (231 lb) (5/24)  % Weight Change (Calculated): -14.7                    BMI Categories: Obese Class 2 (BMI 35.0 -39.9)    Estimated Daily Nutrient Needs:  Energy Requirements Based On: Formula  Weight Used for Energy Requirements: Admission  Energy (kcal/day): [place] x 1.2-1.3= 2793-8069 kcal  Weight Used for Protein Requirements: Admission  Protein (g/day): 1.1g/kg= 95-100g  Method Used 
[facility] at [place]  Speech Language Pathology    Date: 7/1/2024  Patient Name: Kavya De Santiago  YOB: 1960   AGE: 64 y.o.  MRN: [id number]        Patient Not Available for Speech Therapy     Due to:  [] Testing  [] Hemodialysis  [] Cancelled by RN  [] Surgery   [] Intubation/Sedation/Pain Medication  [] Medical instability  [x] Other:      0930- Pt. Reporting terrible headache for which she has been medicated.     ST to reattempt later this pm.     Swathi Modi M.A.CCC/SLP      
[facility]   Acute Rehabilitation Occupational Therapy Daily Treatment Note    Date: 24  Patient Name: Kavya De Santiago       Room: 2608/2608-01  MRN: [id number]  Account: [id number]   : 1960  (64 y.o.) Gender: female       Referring Practitioner: Jose Moran MD  Diagnosis: Subacute infarct left frontal lobe, encephalopathy/meningitis  Additional Pertinent Hx: Pt with history of type 2 diabetes, hypertension hyperlipidemia, CKD, GERD, DIEGO, obesity admitted with change in mental status, nausea vomiting diarrhea.  She required intubation reportedly patient had some upper respiratory symptoms over the past week with dry cough decreased appetite rhinorrhea congestion headache and sinus pain.  She then began to have some diarrhea and vomiting..  EMS noted GCS of 89 decreased O2 sat.  She was intubated CT chest showed no PE CT abdomen pelvis showed pancolitis with abnormal wall thickening of the ascending transverse descending colon sigmoid colon and rectum she was started antibiotic she had elevated glucose influenza was negative. Critical care -acute hypoxic respite failure intubated  extubated , sepsis with H influenza bacteremia and meningitis question colitis has rectal tube, chronic back pain-sees neurology, neurosurgery and pain management Dr. Granados, obstruct sleep apnea no sleep study done never followed up in office nonmorbid obesity COVID-negative. Pt admitted to ARU 24.    Treatment Diagnosis: Impaired self care status    Past Medical History:  has a past medical history of Cervical spondylosis, CKD (chronic kidney disease), COVID-19, Fall, Generalized anxiety disorder, GERD (gastroesophageal reflux disease), History of recent hospitalization, History of swelling of feet, Hyperlipidemia, Hypertension, Left hand weakness, Lumbar radiculopathy, DIEGO (nonalcoholic steatohepatitis), Obesity, Osteoarthritis of left knee, Recurrent major depressive disorder 
Date:   6/15/2024  Patient name: Kavya De Santiago  Date of admission:  6/14/2024  8:59 PM  MRN:   [id number]  YOB: 1960  PCP: Shaina Hamilton MD    Reason for Admission: Meningitis [G03.9]  Encephalopathy [G93.40]    Cardiology follow-up: Positive blood culture, Haemophilus influenzae bacteremia, negative transesophageal echo examination, nonsustained ventricular tachycardia, nonsustained supraventricular tachyarrhythmia        Referring physician: Dr. Gina Lau                            Impression     1.Admission 5/26/2024 acute hypoxic respiratory failure, intubated 5/26/2020 mostly for fever and altered mental status, extubated 6/2/2024  2.Septic shock with haemophilus influenza bacteremia and meningitis CSF fluid positive by molecular panel for H. 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  Mastoiditis/sinusitis on most recent CT of the head 6/6/2024  5. Type 2 diabetes  6.  Normal LV function 2D echo 5/28/2024 RVSP 52 mm Hg  7.  Overweight/KVNG/history of snoring  8.  Chronic back pain has been on opiates, gabapentin, Lyrica  9.  History of hypertension  10.  Gastroesophageal reflux disease  11.DIEGO  12.  Anemia normal MCV  13: Episode of nonsustained supraventricular tachycardia heart rate up to 170  14.  Episodes of nonsustained V. Tach/polymorphic VT 9:15 PM 6/9/2024, episode of 6 beats of V. tach and 7 beats supraventricular tachycardia at 9:30 AM 6/10/2024, serum magnesium was 1.5 and potassium 3.3 prior to polymorphic V. Tach.  15: Lexiscan Myoview stress test no ischemia ejection fraction 69%  16: Hypertension on multiple medication, hypokalemia most likely hyperaldosteronism rhythm     Investigation workup     CT head without contrast  No acute intracranial abnormality  Cerebral and cerebellar parenchymal volume loss with chronic 
Date:   6/16/2024  Patient name: Kavya De Santiago  Date of admission:  6/14/2024  8:59 PM  MRN:   [id number]  YOB: 1960  PCP: Shaina Hamilton MD    Reason for Admission: Meningitis [G03.9]  Encephalopathy [G93.40]    Cardiology follow-up: Positive blood culture, Haemophilus influenzae bacteremia, negative transesophageal echo examination, nonsustained ventricular tachycardia, nonsustained supraventricular tachyarrhythmia        Referring physician: Dr. Gina Lau                            Impression     1.Admission 5/26/2024 acute hypoxic respiratory failure, intubated 5/26/2020 mostly for fever and altered mental status, extubated 6/2/2024  2.Septic shock with haemophilus influenza bacteremia and meningitis CSF fluid positive by molecular panel for H. 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  Mastoiditis/sinusitis on most recent CT of the head 6/6/2024  5. Type 2 diabetes  6.  Normal LV function 2D echo 5/28/2024 RVSP 52 mm Hg  7.  Overweight/KVNG/history of snoring  8.  Chronic back pain has been on opiates, gabapentin, Lyrica  9.  History of hypertension  10.  Gastroesophageal reflux disease  11.DIEGO  12.  Anemia normal MCV  13: Episode of nonsustained supraventricular tachycardia heart rate up to 170  14.  Episodes of nonsustained V. Tach/polymorphic VT 9:15 PM 6/9/2024, episode of 6 beats of V. tach and 7 beats supraventricular tachycardia at 9:30 AM 6/10/2024, serum magnesium was 1.5 and potassium 3.3 prior to polymorphic V. Tach.  15: Lexiscan Myoview stress test no ischemia ejection fraction 69%  16: Hypertension on multiple medication, hypokalemia most likely hyperaldosteronism rhythm    Investigation workup     CT head without contrast  No acute intracranial abnormality  Cerebral and cerebellar parenchymal volume loss with chronic 
Date:   6/17/2024  Patient name: Kavya De Santiago  Date of admission:  6/14/2024  8:59 PM  MRN:   [id number]  YOB: 1960  PCP: Shaina Hamilton MD    Reason for Admission: Meningitis [G03.9]  Encephalopathy [G93.40]    Cardiology follow-up: Positive blood culture, Haemophilus influenzae bacteremia, negative transesophageal echo examination, nonsustained ventricular tachycardia, nonsustained supraventricular tachyarrhythmia       Referring physician: Dr. Gina Lau                            Impression     1.Admission 5/26/2024 acute hypoxic respiratory failure, intubated 5/26/2020 mostly for fever and altered mental status, extubated 6/2/2024  2.Septic shock with haemophilus influenza bacteremia and meningitis CSF fluid positive by molecular panel for H. 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  Mastoiditis/sinusitis on most recent CT of the head 6/6/2024  5. Type 2 diabetes  6.  Normal LV function 2D echo 5/28/2024 RVSP 52 mm Hg  7.  Overweight/KVNG/history of snoring  8.  Chronic back pain has been on opiates, gabapentin, Lyrica  9.  History of hypertension  10.  Gastroesophageal reflux disease  11.DIEGO  12.  Anemia normal MCV  13: Episode of nonsustained supraventricular tachycardia heart rate up to 170  14.  Episodes of nonsustained V. Tach/polymorphic VT 9:15 PM 6/9/2024, episode of 6 beats of V. tach and 7 beats supraventricular tachycardia at 9:30 AM 6/10/2024, serum magnesium was 1.5 and potassium 3.3 prior to polymorphic V. Tach.  15: Lexiscan Myoview stress test no ischemia ejection fraction 69%  16: Hypertension on multiple medication, hypokalemia most likely hyperaldosteronism rhythm    Investigation workup     CT head without contrast  No acute intracranial abnormality  Cerebral and cerebellar parenchymal volume loss with chronic 
Date:   6/18/2024  Patient name: Kavya De Santiago  Date of admission:  6/14/2024  8:59 PM  MRN:   [id number]  YOB: 1960  PCP: Shaina Hamilton MD    Reason for Admission: Meningitis [G03.9]  Encephalopathy [G93.40]    Cardiology follow-up: Positive blood culture, Haemophilus influenzae bacteremia, negative transesophageal echo examination, nonsustained ventricular tachycardia, nonsustained supraventricular tachyarrhythmia          Impression     1.Admission 5/26/2024 acute hypoxic respiratory failure, intubated 5/26/2020 mostly for fever and altered mental status, extubated 6/2/2024  2.Septic shock with haemophilus influenza bacteremia and meningitis CSF fluid positive by molecular panel for H. 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  Mastoiditis/sinusitis on most recent CT of the head 6/6/2024  5. Type 2 diabetes  6.  Normal LV function 2D echo 5/28/2024 RVSP 52 mm Hg  7.  Overweight/KVNG/history of snoring  8.  Chronic back pain has been on opiates, gabapentin, Lyrica  9.  History of hypertension  10.  Gastroesophageal reflux disease  11.DIEGO  12.  Anemia normal MCV  13: Episode of nonsustained supraventricular tachycardia heart rate up to 170  14.  Episodes of nonsustained V. Tach/polymorphic VT 9:15 PM 6/9/2024, episode of 6 beats of V. tach and 7 beats supraventricular tachycardia at 9:30 AM 6/10/2024, serum magnesium was 1.5 and potassium 3.3 prior to polymorphic V. Tach.  15: Lexiscan Myoview stress test no ischemia ejection fraction 69%  16: Hypertension on multiple medication, hypokalemia most likely hyperaldosteronism rhythm    Investigation workup     CT head without contrast  No acute intracranial abnormality  Cerebral and cerebellar parenchymal volume loss with chronic microvascular white matter ischemic disease  Bilateral mastoid 
Date:   6/19/2024  Patient name: Kavya De Santiago  Date of admission:  6/14/2024  8:59 PM  MRN:   [id number]  YOB: 1960  PCP: Shaina Hamilton MD    Reason for Admission: Meningitis [G03.9]  Encephalopathy [G93.40]    Cardiology follow-up: Positive blood culture, Haemophilus influenzae bacteremia, negative transesophageal echo examination, nonsustained ventricular tachycardia, nonsustained supraventricular tachyarrhythmia        Impression     1.Admission 5/26/2024 acute hypoxic respiratory failure, intubated 5/26/2020 mostly for fever and altered mental status, extubated 6/2/2024  2.Septic shock with haemophilus influenza bacteremia and meningitis CSF fluid positive by molecular panel for H. 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  Mastoiditis/sinusitis on most recent CT of the head 6/6/2024  5. Type 2 diabetes  6.  Normal LV function 2D echo 5/28/2024 RVSP 52 mm Hg  7.  Overweight/KVNG/history of snoring  8.  Chronic back pain has been on opiates, gabapentin, Lyrica  9.  History of hypertension  10.  Gastroesophageal reflux disease  11.DIEGO  12.  Anemia normal MCV  13: Episode of nonsustained supraventricular tachycardia heart rate up to 170  14.  Episodes of nonsustained V. Tach/polymorphic VT 9:15 PM 6/9/2024, episode of 6 beats of V. tach and 7 beats supraventricular tachycardia at 9:30 AM 6/10/2024, serum magnesium was 1.5 and potassium 3.3 prior to polymorphic V. Tach.  15: Lexiscan Myoview stress test no ischemia ejection fraction 69%  16: Hypertension on multiple medication, hypokalemia most likely hyperaldosteronism rhythm     Investigation workup    CT head without contrast  No acute intracranial abnormality  Cerebral and cerebellar parenchymal volume loss with chronic microvascular white matter ischemic disease  Bilateral mastoid 
Date:   6/20/2024  Patient name: Kavya De Santiago  Date of admission:  6/14/2024  8:59 PM  MRN:   [id number]  YOB: 1960  PCP: Shaina Hamilton MD    Reason for Admission: Meningitis [G03.9]  Encephalopathy [G93.40]    Cardiology follow-up: Positive blood culture, Haemophilus influenzae bacteremia, negative transesophageal echo examination, nonsustained ventricular tachycardia, nonsustained supraventricular tachyarrhythmia        Impression     1.Admission 5/26/2024 acute hypoxic respiratory failure, intubated 5/26/2020 mostly for fever and altered mental status, extubated 6/2/2024  2.Septic shock with haemophilus influenza bacteremia and meningitis CSF fluid positive by molecular panel for H. 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  Mastoiditis/sinusitis on most recent CT of the head 6/6/2024  5. Type 2 diabetes  6.  Normal LV function 2D echo 5/28/2024 RVSP 52 mm Hg  7.  Overweight/KVNG/history of snoring  8.  Chronic back pain has been on opiates, gabapentin, Lyrica  9.  History of hypertension  10.  Gastroesophageal reflux disease  11.DIEGO  12.  Anemia normal MCV  13: Episode of nonsustained supraventricular tachycardia heart rate up to 170  14.  Episodes of nonsustained V. Tach/polymorphic VT 9:15 PM 6/9/2024, episode of 6 beats of V. tach and 7 beats supraventricular tachycardia at 9:30 AM 6/10/2024, serum magnesium was 1.5 and potassium 3.3 prior to polymorphic V. Tach.  15: Lexiscan Myoview stress test no ischemia ejection fraction 69%  16: Hypertension on multiple medication, hypokalemia most likely hyperaldosteronism rhythm     Investigation workup    CT head without contrast  No acute intracranial abnormality  Cerebral and cerebellar parenchymal volume loss with chronic microvascular white matter ischemic disease  Bilateral mastoid 
Date:   6/21/2024  Patient name: Kavya De Santiago  Date of admission:  6/14/2024  8:59 PM  MRN:   [id number]  YOB: 1960  PCP: Shaina Hamilton MD    Reason for Admission: Meningitis [G03.9]  Encephalopathy [G93.40]    Cardiology follow-up:   Positive blood culture, Haemophilus influenzae bacteremia, negative transesophageal echo examination, nonsustained ventricular tachycardia, nonsustained supraventricular tachyarrhythmia        Impression     1.Admission 5/26/2024 acute hypoxic respiratory failure, intubated 5/26/2020 mostly for fever and altered mental status, extubated 6/2/2024  2.Septic shock with haemophilus influenza bacteremia and meningitis CSF fluid positive by molecular panel for H. 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  Mastoiditis/sinusitis on most recent CT of the head 6/6/2024  5. Type 2 diabetes  6.  Normal LV function 2D echo 5/28/2024 RVSP 52 mm Hg  7.  Overweight/KVNG/history of snoring  8.  Chronic back pain has been on opiates, gabapentin, Lyrica  9.  History of hypertension  10.  Gastroesophageal reflux disease  11.DIEGO  12.  Anemia normal MCV  13: Episode of nonsustained supraventricular tachycardia heart rate up to 170  14.  Episodes of nonsustained V. Tach/polymorphic VT 9:15 PM 6/9/2024, episode of 6 beats of V. tach and 7 beats supraventricular tachycardia at 9:30 AM 6/10/2024, serum magnesium was 1.5 and potassium 3.3 prior to polymorphic V. Tach.  15: Lexiscan Myoview stress test no ischemia ejection fraction 69%  16: Hypertension on multiple medication, hypokalemia most likely hyperaldosteronism rhythm     Investigation workup     CT head without contrast 6/18/2024  No acute intracranial abnormality  Cerebral and cerebellar parenchymal volume loss with chronic microvascular white matter ischemic disease  Bilateral 
Date:   6/25/2024  Patient name: Kavya De Santiago  Date of admission:  6/14/2024  8:59 PM  MRN:   [id number]  YOB: 1960  PCP: Shaina Hamilton MD    Reason for Admission: Meningitis [G03.9]  Encephalopathy [G93.40]    Cardiology follow-up:   Positive blood culture, Haemophilus influenzae bacteremia, negative transesophageal echo examination, nonsustained ventricular tachycardia, nonsustained supraventricular tachyarrhythmia        Impression     1.Admission 5/26/2024 acute hypoxic respiratory failure, intubated 5/26/2020 mostly for fever and altered mental status, extubated 6/2/2024  2.Septic shock with haemophilus influenza bacteremia and meningitis CSF fluid positive by molecular panel for H. 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  Mastoiditis/sinusitis on most recent CT of the head 6/6/2024  5. Type 2 diabetes  6.  Normal LV function 2D echo 5/28/2024 RVSP 52 mm Hg  7.  Overweight/KVNG/history of snoring  8.  Chronic back pain has been on opiates, gabapentin, Lyrica  9.  History of hypertension  10.  Gastroesophageal reflux disease  11.DIEGO  12.  Anemia normal MCV  13: Episode of nonsustained supraventricular tachycardia heart rate up to 170  14.  Episodes of nonsustained V. Tach/polymorphic VT 9:15 PM 6/9/2024, episode of 6 beats of V. tach and 7 beats supraventricular tachycardia at 9:30 AM 6/10/2024, serum magnesium was 1.5 and potassium 3.3 prior to polymorphic V. Tach.  15: Lexiscan Myoview stress test no ischemia ejection fraction 69%  16: Hypertension on multiple medication, hypokalemia most likely hyperaldosteronism rhythm     Investigation workup     MRI and MRV brain 6/21/2024  No acute intracranial abnormality  No evidence of dural venous sinus thrombosis  Severe bilateral mastoid effusion with opacification of the left middle ear   
Date:   6/26/2024  Patient name: Kavya De Santiago  Date of admission:  6/14/2024  8:59 PM  MRN:   [id number]  YOB: 1960  PCP: Shaina Hamilton MD    Reason for Admission: Meningitis [G03.9]  Encephalopathy [G93.40]    Cardiology follow-up:   Positive blood culture, Haemophilus influenzae bacteremia, negative transesophageal echo examination, nonsustained ventricular tachycardia, nonsustained supraventricular tachyarrhythmia        Impression     1.Admission 5/26/2024 acute hypoxic respiratory failure, intubated 5/26/2020 mostly for fever and altered mental status, extubated 6/2/2024  2.Septic shock with haemophilus influenza bacteremia and meningitis CSF fluid positive by molecular panel for H. 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  Mastoiditis/sinusitis on most recent CT of the head 6/6/2024  5. Type 2 diabetes  6.  Normal LV function 2D echo 5/28/2024 RVSP 52 mm Hg  7.  Overweight/KVNG/history of snoring  8.  Chronic back pain has been on opiates, gabapentin, Lyrica  9.  History of hypertension  10.  Gastroesophageal reflux disease  11.DIEGO  12.  Anemia normal MCV  13: Episode of nonsustained supraventricular tachycardia heart rate up to 170  14.  Episodes of nonsustained V. Tach/polymorphic VT 9:15 PM 6/9/2024, episode of 6 beats of V. tach and 7 beats supraventricular tachycardia at 9:30 AM 6/10/2024, serum magnesium was 1.5 and potassium 3.3 prior to polymorphic V. Tach.  15: Lexiscan Myoview stress test no ischemia ejection fraction 69%  16: Hypertension on multiple medication, hypokalemia most likely hyperaldosteronism rhythm    Investigation workup     MRI and MRV brain 6/21/2024  No acute intracranial abnormality  No evidence of dural venous sinus thrombosis  Severe bilateral mastoid effusion with opacification of the left middle ear   
Date:   6/27/2024  Patient name: Kavya De Santiago  Date of admission:  6/14/2024  8:59 PM  MRN:   [id number]  YOB: 1960  PCP: Shaina Hamilton MD    Reason for Admission: Meningitis [G03.9]  Encephalopathy [G93.40]    Cardiology follow-up:   Positive blood culture, Haemophilus influenzae bacteremia, negative transesophageal echo examination, nonsustained ventricular tachycardia, nonsustained supraventricular tachyarrhythmia        Impression     1.Admission 5/26/2024 acute hypoxic respiratory failure, intubated 5/26/2020 mostly for fever and altered mental status, extubated 6/2/2024  2.Septic shock with haemophilus influenza bacteremia and meningitis CSF fluid positive by molecular panel for H. 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  Mastoiditis/sinusitis on most recent CT of the head 6/6/2024  5. Type 2 diabetes  6.  Normal LV function 2D echo 5/28/2024 RVSP 52 mm Hg  7.  Overweight/KVNG/history of snoring  8.  Chronic back pain has been on opiates, gabapentin, Lyrica  9.  History of hypertension  10.  Gastroesophageal reflux disease  11.DIEGO  12.  Anemia normal MCV  13: Episode of nonsustained supraventricular tachycardia heart rate up to 170  14.  Episodes of nonsustained V. Tach/polymorphic VT 9:15 PM 6/9/2024, episode of 6 beats of V. tach and 7 beats supraventricular tachycardia at 9:30 AM 6/10/2024, serum magnesium was 1.5 and potassium 3.3 prior to polymorphic V. Tach.  15: Lexiscan Myoview stress test no ischemia ejection fraction 69%  16: Hypertension on multiple medication, hypokalemia most likely hyperaldosteronism rhythm    Investigation workup     MRI and MRV brain 6/21/2024  No acute intracranial abnormality  No evidence of dural venous sinus thrombosis  Severe bilateral mastoid effusion with opacification of the left middle ear   
Date:   6/29/2024  Patient name: Kavya De Santiago  Date of admission:  6/14/2024  8:59 PM  MRN:   [id number]  YOB: 1960  PCP: Shaina Hamilton MD    Reason for Admission: Meningitis [G03.9]  Encephalopathy [G93.40]    Cardiology follow-up:   Positive blood culture, Haemophilus influenzae bacteremia, negative transesophageal echo examination, nonsustained ventricular tachycardia, nonsustained supraventricular tachyarrhythmia       Impression     1.Admission 5/26/2024 acute hypoxic respiratory failure, intubated 5/26/2020 mostly for fever and altered mental status, extubated 6/2/2024  2.Septic shock with haemophilus influenza bacteremia and meningitis CSF fluid positive by molecular panel for H. 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  Mastoiditis/sinusitis on most recent CT of the head 6/6/2024  5. Type 2 diabetes  6.  Normal LV function 2D echo 5/28/2024 RVSP 52 mm Hg  7.  Overweight/KVNG/history of snoring  8.  Chronic back pain has been on opiates, gabapentin, Lyrica  9.  History of hypertension  10.  Gastroesophageal reflux disease  11.DIEGO  12.  Anemia normal MCV  13: Episode of nonsustained supraventricular tachycardia heart rate up to 170  14.  Episodes of nonsustained V. Tach/polymorphic VT 9:15 PM 6/9/2024, episode of 6 beats of V. tach and 7 beats supraventricular tachycardia at 9:30 AM 6/10/2024, serum magnesium was 1.5 and potassium 3.3 prior to polymorphic V. Tach.  15: Lexiscan Myoview stress test no ischemia ejection fraction 69%  16: Hypertension on multiple medication, hypokalemia most likely hyperaldosteronism rhythm    Investigation workup     MRI and MRV brain 6/21/2024  No acute intracranial abnormality  No evidence of dural venous sinus thrombosis  Severe bilateral mastoid effusion with opacification of the left middle ear   
Date:   7/1/2024  Patient name: Kavya De Santiago  Date of admission:  6/14/2024  8:59 PM  MRN:   [id number]  YOB: 1960  PCP: Shaina Hamilton MD    Reason for Admission: Meningitis [G03.9]  Encephalopathy [G93.40]      Cardiology follow-up:   Positive blood culture, Haemophilus influenzae bacteremia, negative transesophageal echo examination, nonsustained ventricular tachycardia, nonsustained supraventricular tachyarrhythmia             Impression     1.Admission 5/26/2024 acute hypoxic respiratory failure, intubated 5/26/2024 , fever and altered mental status, extubated 6/2/2024  2.Septic shock with haemophilus influenza bacteremia and meningitis CSF fluid positive by molecular panel for H. 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  Mastoiditis/sinusitis on most recent CT of the head 6/6/2024  5. Type 2 diabetes  6.  Normal LV function 2D echo 5/28/2024 RVSP 52 mm Hg  7.  Overweight/KVNG/history of snoring  8.  Chronic back pain has been on opiates, gabapentin, Lyrica  9.  History of hypertension  10.  Gastroesophageal reflux disease  11.DIEGO  12.  Anemia normal MCV  13: Episode of nonsustained supraventricular tachycardia heart rate up to 170  14.  Episodes of nonsustained V. Tach/polymorphic VT 9:15 PM 6/9/2024, episode of 6 beats of V. tach and 7 beats supraventricular tachycardia at 9:30 AM 6/10/2024, serum magnesium was 1.5 and potassium 3.3 prior to polymorphic V. Tach.  15: Lexiscan Myoview stress test no ischemia ejection fraction 69%  16: Hypertension on multiple medication, hypokalemia most likely hyperaldosteronism     Investigation workup     MRI and MRV brain 6/21/2024  No acute intracranial abnormality  No evidence of dural venous sinus thrombosis  Severe bilateral mastoid effusion with opacification of the left middle ear     CT head 
Date:   7/3/2024  Patient name: Kavya De Santiago  Date of admission:  6/14/2024  8:59 PM  MRN:   [id number]  YOB: 1960  PCP: Shaina Hamilton MD    Reason for Admission: Meningitis [G03.9]  Encephalopathy [G93.40]    Cardiology follow-up:   Positive blood culture, Haemophilus influenzae bacteremia, negative transesophageal echo examination, nonsustained ventricular tachycardia, nonsustained supraventricular tachyarrhythmia         Impression     1.Admission 5/26/2024 acute hypoxic respiratory failure, intubated 5/26/2024 , fever and altered mental status, extubated 6/2/2024  2.Septic shock with haemophilus influenza bacteremia and meningitis CSF fluid positive by molecular panel for H. 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  Mastoiditis/sinusitis on most recent CT of the head 6/6/2024  5. Type 2 diabetes  6.  Normal LV function 2D echo 5/28/2024 RVSP 52 mm Hg  7.  Overweight/KVNG/history of snoring  8.  Chronic back pain has been on opiates, gabapentin, Lyrica  9.  History of hypertension  10.  Gastroesophageal reflux disease  11.DIEGO  12.  Anemia normal MCV  13: Episode of nonsustained supraventricular tachycardia heart rate up to 170  14.  Episodes of nonsustained V. Tach/polymorphic VT 9:15 PM 6/9/2024, episode of 6 beats of V. tach and 7 beats supraventricular tachycardia at 9:30 AM 6/10/2024, serum magnesium was 1.5 and potassium 3.3 prior to polymorphic V. Tach.  15: Lexiscan Myoview stress test no ischemia ejection fraction 69%  16: Hypertension on multiple medication, hypokalemia most likely hyperaldosteronism          Investigation workup     MRI and MRV brain 6/21/2024  No acute intracranial abnormality  No evidence of dural venous sinus thrombosis  Severe bilateral mastoid effusion with opacification of the left middle ear     CT head 
Dinner sugar 197.   
[facility]   Acute Rehabilitation Occupational Therapy Evaluation     Date: 6/15/24  Patient Name: Kavya De Santiago       Room: 2608/2608-01  MRN: [id number]  Account: [id number]   : 1960  (64 y.o.) Gender: female     Referring Practitioner: Jose Moran MD  Diagnosis: Subacute infarct left frontal lobe, encephalopathy/meningitis  Additional Pertinent Hx: Pt with history of type 2 diabetes, hypertension hyperlipidemia, CKD, GERD, DIEGO, obesity admitted with change in mental status, nausea vomiting diarrhea.  She required intubation reportedly patient had some upper respiratory symptoms over the past week with dry cough decreased appetite rhinorrhea congestion headache and sinus pain.  She then began to have some diarrhea and vomiting..  EMS noted GCS of 89 decreased O2 sat.  She was intubated CT chest showed no PE CT abdomen pelvis showed pancolitis with abnormal wall thickening of the ascending transverse descending colon sigmoid colon and rectum she was started antibiotic she had elevated glucose influenza was negative. Critical care -acute hypoxic respite failure intubated  extubated , sepsis with H influenza bacteremia and meningitis question colitis has rectal tube, chronic back pain-sees neurology, neurosurgery and pain management Dr. Granados, obstruct sleep apnea no sleep study done never followed up in office nonmorbid obesity COVID-negative. Pt admitted to ARU 24.    Treatment Diagnosis: Impaired self care status    Past Medical History:  has a past medical history of Cervical spondylosis, CKD (chronic kidney disease), COVID-19, Fall, Generalized anxiety disorder, GERD (gastroesophageal reflux disease), History of recent hospitalization, History of swelling of feet, Hyperlipidemia, Hypertension, Left hand weakness, Lumbar radiculopathy, DIEGO (nonalcoholic steatohepatitis), Obesity, Osteoarthritis of left knee, Recurrent major depressive disorder (HCC), 
[facility]   Acute Rehabilitation Occupational Therapy Daily Treatment Note    Date: 24  Patient Name: Kavya De Santiago       Room: 2608/2608-01  MRN: [id number]  Account: [id number]   : 1960  (64 y.o.) Gender: female       Referring Practitioner: Jose Moran MD  Diagnosis: Subacute infarct left frontal lobe, encephalopathy/meningitis  Additional Pertinent Hx: Pt with history of type 2 diabetes, hypertension hyperlipidemia, CKD, GERD, DIEGO, obesity admitted with change in mental status, nausea vomiting diarrhea.  She required intubation reportedly patient had some upper respiratory symptoms over the past week with dry cough decreased appetite rhinorrhea congestion headache and sinus pain.  She then began to have some diarrhea and vomiting..  EMS noted GCS of 89 decreased O2 sat.  She was intubated CT chest showed no PE CT abdomen pelvis showed pancolitis with abnormal wall thickening of the ascending transverse descending colon sigmoid colon and rectum she was started antibiotic she had elevated glucose influenza was negative. Critical care -acute hypoxic respite failure intubated  extubated , sepsis with H influenza bacteremia and meningitis question colitis has rectal tube, chronic back pain-sees neurology, neurosurgery and pain management Dr. Granados, obstruct sleep apnea no sleep study done never followed up in office nonmorbid obesity COVID-negative. Pt admitted to ARU 24.    Treatment Diagnosis: Impaired self care status    Past Medical History:  has a past medical history of Cervical spondylosis, CKD (chronic kidney disease), COVID-19, Fall, Generalized anxiety disorder, GERD (gastroesophageal reflux disease), History of recent hospitalization, History of swelling of feet, Hyperlipidemia, Hypertension, Left hand weakness, Lumbar radiculopathy, DIEGO (nonalcoholic steatohepatitis), Obesity, Osteoarthritis of left knee, Recurrent major depressive disorder 
[facility]   Acute Rehabilitation Occupational Therapy Daily Treatment Note    Date: 24  Patient Name: Kavya De Santiago       Room: 2608/2608-01  MRN: [id number]  Account: [id number]   : 1960  (64 y.o.) Gender: female       Referring Practitioner: Jose Moran MD  Diagnosis: Subacute infarct left frontal lobe, encephalopathy/meningitis  Additional Pertinent Hx: Pt with history of type 2 diabetes, hypertension hyperlipidemia, CKD, GERD, DIEGO, obesity admitted with change in mental status, nausea vomiting diarrhea.  She required intubation reportedly patient had some upper respiratory symptoms over the past week with dry cough decreased appetite rhinorrhea congestion headache and sinus pain.  She then began to have some diarrhea and vomiting..  EMS noted GCS of 89 decreased O2 sat.  She was intubated CT chest showed no PE CT abdomen pelvis showed pancolitis with abnormal wall thickening of the ascending transverse descending colon sigmoid colon and rectum she was started antibiotic she had elevated glucose influenza was negative. Critical care -acute hypoxic respite failure intubated  extubated , sepsis with H influenza bacteremia and meningitis question colitis has rectal tube, chronic back pain-sees neurology, neurosurgery and pain management Dr. Granados, obstruct sleep apnea no sleep study done never followed up in office nonmorbid obesity COVID-negative. Pt admitted to ARU 24.    Treatment Diagnosis: Impaired self care status    Past Medical History:  has a past medical history of Cervical spondylosis, CKD (chronic kidney disease), COVID-19, Fall, Generalized anxiety disorder, GERD (gastroesophageal reflux disease), History of recent hospitalization, History of swelling of feet, Hyperlipidemia, Hypertension, Left hand weakness, Lumbar radiculopathy, DIEGO (nonalcoholic steatohepatitis), Obesity, Osteoarthritis of left knee, Recurrent major depressive disorder 
HS BS is 295  
Lunch sugar 191.   
[facility]   Acute Rehabilitation Occupational Therapy Daily Treatment Note    Date: 24  Patient Name: Kavya De Santiago       Room: 2608/2608-01  MRN: [id number]  Account: [id number]   : 1960  (64 y.o.) Gender: female     Referring Practitioner: Jose Morna MD  Diagnosis: Subacute infarct left frontal lobe, encephalopathy/meningitis  Additional Pertinent Hx: Pt with history of type 2 diabetes, hypertension hyperlipidemia, CKD, GERD, DIEGO, obesity admitted with change in mental status, nausea vomiting diarrhea.  She required intubation reportedly patient had some upper respiratory symptoms over the past week with dry cough decreased appetite rhinorrhea congestion headache and sinus pain.  She then began to have some diarrhea and vomiting..  EMS noted GCS of 89 decreased O2 sat.  She was intubated CT chest showed no PE CT abdomen pelvis showed pancolitis with abnormal wall thickening of the ascending transverse descending colon sigmoid colon and rectum she was started antibiotic she had elevated glucose influenza was negative. Critical care -acute hypoxic respite failure intubated  extubated , sepsis with H influenza bacteremia and meningitis question colitis has rectal tube, chronic back pain-sees neurology, neurosurgery and pain management Dr. Granados, obstruct sleep apnea no sleep study done never followed up in office nonmorbid obesity COVID-negative. Pt admitted to ARU 24.    Treatment Diagnosis: Impaired self care status    Past Medical History:  has a past medical history of Cervical spondylosis, CKD (chronic kidney disease), COVID-19, Fall, Generalized anxiety disorder, GERD (gastroesophageal reflux disease), History of recent hospitalization, History of swelling of feet, Hyperlipidemia, Hypertension, Left hand weakness, Lumbar radiculopathy, DIEGO (nonalcoholic steatohepatitis), Obesity, Osteoarthritis of left knee, Recurrent major depressive disorder 
Patient AM blood glucose is 213  
Patient HS blood glucose is 254  
Patient blood sugar 165    
Patient blood sugar 172  
Patient blood sugar 173  
Patient blood sugar 181 with sandee  
Patient blood sugar 189 via sandee  
Patient blood sugar 201.  
Patient blood sugar 213   
Patient blood sugar 254, checked using Clifford  
Patient blood sugar 257 at 1106 am per patient sandee    
Patient blood sugar 259  
Patient blood sugar 265 by krystin  
Patient blood sugar 280 with sandee  
Patient blood sugar is 212   
Patient diabetic education provided to patient and her . All of patients questions were answered. Patient did not have any further questions. Handouts given to assist with education.   
Patients HS blood glucose was 291, no sliding scale coverage given  
Patients blood sugar 297, checked using Clifofrd  
Patients blood sugar is 260 via sandee  
Per Dr. Haynes patient is okay to remain on unit, spoke with medical team and patient will F/U with ENT on an outpatient basis. As far as IV magnesium, per Dr. Haynes will discuss this with neuro tomorrow, and as for now patient is on po magnesium. Patient and  aware of update, no questions at this time. Writer will continue to monitor for any worsening s/s.   
Per MD [facility] to restart latanoprost and start Requip 2mg nightly. Pt notified and verbalized understanding.   
Per Patient monitor BGL is 180 at this time    12:03: Per pt monitor  at this time    17:01 per pt monitor  at this time  
Per Patient monitor BS     6/16 @ 2100      6/17 @ 0600   
Physical Medicine & Rehabilitation  Progress Note      Subjective:      Kavya De Santiago is a 64 y.o. 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    She reports feeling better today than yesterday.  She rates headache at 4 out of 10 this morning rather than 9-10 out of 10.  Toradol and IV steroids ordered by neurology seem to be helping.  She still reports some bilateral ear pain and decreased hearing.  She denies any other acute concerns.    ROS:  Denies fevers, chills, sweats.  No chest pain, palpitations, lightheadedness.  Denies coughing, wheezing or shortness of breath.  Denies abdominal pain, nausea, diarrhea or constipation.  No new areas of joint pain.  Denies new areas of numbness or weakness.  Denies new anxiety or depression issues.  No new skin problems.    Rehabilitation:     Physical Therapy    Restrictions/Precautions: Fall Risk, General Precautions  Implants present? :  ( denies)    Bed mobility  Rolling to Right: Stand by assistance  Supine to Sit: Stand by assistance  Sit to Supine: Stand by assistance  Scooting: Stand by assistance  Bed Mobility Comments: up in recliner    Transfers  Sit to Stand: Stand by assistance (VCs hand placement to RW)  Stand to Sit: Stand by assistance (VCs haqnd placement from RW)  Bed to Chair: Contact guard assistance  Stand Pivot Transfers: Stand by assistance (RW and SC, VCs safe RW managment with education of benefits)  Squat Pivot Transfers: Stand by assistance (no AD , pt seeks UE support, VCs for placement)  Comment: increased time to complete functional transfers    Ambulation  Surface: Level tile  Device: Single point cane (hurry cane base right hand)  Assistance: Stand by assistance  Quality of Gait: decreased left step lengh , right stance time , right hip ext mid stance to toe off , increased lateral trunk sway right > left , trunk flexion  Gait Deviations: Slow Kaelyn, Decreased step length, Decreased step height, 
Physical Medicine & Rehabilitation  Progress Note      Subjective:      Kavya De Santiago is a 64 y.o. 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    She reports feeling okay today.  She notes feeling a little bit better overall but does have ongoing headache.  She denies any other acute concerns.    ROS:  Denies fevers, chills, sweats.  No chest pain, palpitations, lightheadedness.  Denies coughing, wheezing or shortness of breath.  Denies abdominal pain, nausea, diarrhea or constipation.  No new areas of joint pain.  Denies new areas of numbness or weakness.  Denies new anxiety or depression issues.  No new skin problems.    Rehabilitation:     Physical Therapy    Restrictions/Precautions: Fall Risk, General Precautions  Implants present? : Deep brain stimulator    Bed mobility  Rolling to Right: Stand by assistance  Supine to Sit: Stand by assistance  Sit to Supine: Stand by assistance  Scooting: Stand by assistance  Bed Mobility Comments: HOB flat, use of railing for supine to sit and Rolling    Transfers  Sit to Stand: Stand by assistance  Stand to Sit: Stand by assistance  Bed to Chair: Contact guard assistance  Stand Pivot Transfers: Stand by assistance  Squat Pivot Transfers: Stand by assistance (no AD , pt seeks UE support, VCs for placement)  Comment: increased time to complete functional transfers    Ambulation  Surface: Level tile  Device: Rolling Walker  Assistance: Stand by assistance  Quality of Gait: decreased left step length , right stance time  Gait Deviations: Slow Kaelyn, Decreased step length, Decreased step height, Decreased head and trunk rotation  Distance: 100' x2 with seated rest between. 84' in PM (+ additional short distances with hurricane.)  Comments: pt advances SC every 2 step bilateral , VCs and TC decraesed right lateral trunk flexion stance phase , pt reports mild fatigue at complation of distance. Pt alternates UE use right <> left as needed per 
Physical Medicine & Rehabilitation  Progress Note      Subjective:      Kavya De Santiago is a 64 y.o. 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    She reports feeling poorly again today due to headache as well as ear and jaw pain.  She also notes some dizziness during therapy.  Discussed consult to neurology due to recent meningitis and persistent headache.  She denies any other new symptoms.    ROS:  Denies fevers, chills, sweats.  No chest pain, palpitations, lightheadedness.  Denies coughing, wheezing or shortness of breath.  Denies abdominal pain, nausea, diarrhea or constipation.  No new areas of joint pain.  Denies new areas of numbness or weakness.  Denies new anxiety or depression issues.  No new skin problems.    Rehabilitation:     Physical Therapy    Restrictions/Precautions: Fall Risk, General Precautions  Implants present? :  (denies)    Bed mobility  Rolling to Right: Stand by assistance  Supine to Sit: Stand by assistance  Sit to Supine: Stand by assistance  Scooting: Stand by assistance  Bed Mobility Comments: bed flat no handrail    Transfers  Sit to Stand: Stand by assistance  Stand to Sit: Stand by assistance  Bed to Chair: Contact guard assistance  Stand Pivot Transfers: Contact guard assistance (RW and SC, VCs safe RW managment with education of benefits)  Squat Pivot Transfers: Contact guard assistance (no AD , pt seeks UE support, VCs for placement)  Comment: performs transfers with RW, and without device if sitting surfaces are set very near    Ambulation  Surface: Level tile  Device: Single point cane (hurry cane base right hand)  Assistance: Stand by assistance  Quality of Gait: decreased left step lengh , right stance time , right hip ext mid stance to toe off , increased lateral trunk sway right > left , trunk flexion  Gait Deviations: Slow Kaelyn, Decreased step length, Decreased step height, Decreased head and trunk rotation  Distance: 40 ft x 
Physical Medicine & Rehabilitation  Progress Note      Subjective:      Kavya De Santiago is a 64 y.o. 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    She reports feeling poorly today due to headache.  She states that the headache was significant last night but has been going on for the last few days.  She describes the headache as a squeezing or pressure-type pain around the whole head.  She also notes bilateral ear pain.  She denies any changes in vision, new numbness/tingling, and new weakness.  She reports only occasional dizziness with standing, which is mild.  Discussed obtaining CT head due to this headache.  She denies any other acute concerns.    ROS:  Denies fevers, chills, sweats.  No chest pain, palpitations, lightheadedness.  Denies coughing, wheezing or shortness of breath.  Denies abdominal pain, nausea, diarrhea or constipation.  No new areas of joint pain.  Denies new areas of numbness or weakness.  Denies new anxiety or depression issues.  No new skin problems.    Rehabilitation:     Physical Therapy    Restrictions/Precautions: Fall Risk, General Precautions  Implants present? :  ( denies)    Bed mobility  Rolling to Right: Stand by assistance  Supine to Sit: Stand by assistance  Sit to Supine: Stand by assistance  Scooting: Stand by assistance  Bed Mobility Comments: in WC AM, supine PM    Transfers  Sit to Stand: Stand by assistance  Stand to Sit: Stand by assistance  Bed to Chair: Contact guard assistance  Stand Pivot Transfers: Contact guard assistance (RW and SC, VCs safe RW managment with education of benefits)  Squat Pivot Transfers: Contact guard assistance (no AD , pt seeks UE support, VCs for placement)  Comment: performs transfers with RW, and without device if sitting surfaces are set very near    Ambulation  Surface: Level tile  Device: Single point cane (hurry cane base left hand)  Assistance: Contact guard assistance  Quality of Gait: decreased 
Physical Medicine & Rehabilitation  Progress Note    6/15/2024 9:48 AM     Subjective:   Encephalopathy due to meningitis and Subacute infarct left frontal lob     Feeling well this AM , slept well , got a device for her hearing , able to communicate much better, denies any complaints at this point,    ROS:    Constitutional: negative for anorexia, chills, fatigue, fevers, sweats and weight loss  Ears, nose, mouth, throat, and face: negative for earaches, sore throat and tinnitus, positive for hearing loss.  Respiratory: negative for cough and shortness of breath  Cardiovascular: negative for chest pain, dyspnea and palpitations  Gastrointestinal: negative for abdominal pain, change in bowel habits, constipation, nausea and vomiting  Genitourinary:negative for dysuria, frequency, hesitancy and urinary incontinence  Musculoskeletal:negative for muscle weakness and stiff joints  Neurological: negative for dizziness, headaches and weakness    Rehabilitation:       PT: pending evaluation    OT: pending evaluation      ST    Oral Motor   Labial: No impairment  Dentition: Intact  Oral Hygiene: Moist;Clean  Lingual: No impairment     Motor Speech  Apraxic Characteristics: None  Dysarthric Characteristics: None  Intelligibility: No impairment  Overall Impairment Severity: None     Auditory Comprehension  Comprehension: Exceptions  One Step Commands: WFL  Two Step Commands: WFL  Multistep Commands: Mild  Complex/Abstract Commands: Mild  Conversation: Mild        Expression  Primary Mode of Expression: Verbal     Verbal Expression  Verbal Expression: Exceptions to functional limits  Initiation: Mild   Automatic Speech: WFL  Confrontation: WFL  Convergent: WFL  Divergent: Mild (10 items in 60 seconds (norm = 11+ items))  Responsive: WFL  Conversation: Mild  Effective Techniques: Provide extra time;Word retrived strategies           Pragmatics/Social Functioning  Pragmatics: Within functional limits     Cognition: 
Physical Medicine & Rehabilitation  Progress Note    6/16/2024 12:32 PM     Subjective:   Encephalopathy due to meningitis and Subacute infarct left frontal lob     Feeling well this AM , slept well  , able to communicate much better with hearing device, denies any complaints at this point feels sore after Tx otherwise.    ROS:    Constitutional: negative for anorexia, chills, fatigue, fevers, sweats and weight loss  Ears, nose, mouth, throat, and face: negative for earaches, sore throat and tinnitus, positive for hearing loss.  Respiratory: negative for cough and shortness of breath  Cardiovascular: negative for chest pain, dyspnea and palpitations  Gastrointestinal: negative for abdominal pain, change in bowel habits, constipation, nausea and vomiting  Genitourinary:negative for dysuria, frequency, hesitancy and urinary incontinence  Musculoskeletal:negative for muscle weakness and stiff joints  Neurological: negative for dizziness, headaches and weakness    Rehabilitation:       PT:     Functional Mobility  Bed mobility  Rolling to Right: Stand by assistance  Supine to Sit: Stand by assistance  Sit to Supine: Stand by assistance  Scooting: Stand by assistance  Bed Mobility Comments: HOB flat, use of railing for supine to sit and Rolling  Transfers  Sit to Stand: Minimal Assistance  Stand to Sit: Minimal Assistance  Bed to Chair: Minimal assistance  Stand Pivot Transfers: Minimal Assistance  Comment: RW for transfers, Occasional cues for hands placement. Pt also Trials Sit to stand transfers with SPC and with no device, requiring Giovanni  Balance  Posture: Fair  Sitting - Static: Fair;+ (EOB)  Sitting - Dynamic: Fair;+ (EOB)  Standing - Static: Fair;+ (RW)  Standing - Dynamic: Fair;+ (RW)  Comments: Pt demo's Slight posterior LOB at sink during hand hygiene, with ability to self correct with slight CGA for steadying     Environmental Mobility  Ambulation  Surface: Level tile  Device: Rolling Walker  Assistance: 
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  Wearing hearing assistive device.  Working with speech.  Headaches better.  Reviewed discharge plans SNF versus home with patient    ROS:  Denies fevers, chills, sweats.  No chest pain, palpitations, lightheadedness.  Denies coughing, wheezing or shortness of breath.  Denies abdominal pain, nausea, diarrhea or constipation.  No new areas of joint pain.  Denies new areas of numbness or weakness.  Denies new anxiety or depression issues.  No new skin problems.    Rehabilitation:   PT:    Bed mobility  Rolling to Right: Stand by assistance  Supine to Sit: Stand by assistance  Sit to Supine: Stand by assistance  Scooting: Stand by assistance  Bed Mobility Comments: HOB flat, use of railing for supine to sit and Rolling    Transfers  Sit to Stand: Stand by assistance  Stand to Sit: Stand by assistance  Bed to Chair: Contact guard assistance  Stand Pivot Transfers: Stand by assistance  Squat Pivot Transfers: Stand by assistance (no AD , pt seeks UE support, VCs for placement)  Comment: increased time to complete functional transfers    Ambulation  Surface: Level tile  Device: Rolling Walker  Assistance: Stand by assistance  Quality of Gait: decreased left step length , right stance time, steady gait, improved endurance  Gait Deviations: Slow Kaelyn, Decreased step length, Decreased step height, Decreased head and trunk rotation  Distance: 137' in AM and 200' in PM  Comments: pt advances SC every 2 step bilateral , VCs and TC decraesed right lateral trunk flexion stance phase , pt reports mild fatigue at complation of distance. Pt alternates UE use right <> left as needed per pt direction.  More Ambulation?: Yes  Ambulation 2  Surface - 2: level tile  Device 2: Rolling Walker  Assistance 2: Stand by 
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  Wearing hearing assistive device.  Working with speech again today.  Headaches better-2/10.  Denies difficulty with bowels or bladder    ROS:  Denies fevers, chills, sweats.  No chest pain, palpitations, lightheadedness.  Denies coughing, wheezing or shortness of breath.  Denies abdominal pain, nausea, diarrhea or constipation.  No new areas of joint pain.  Denies new areas of numbness or weakness.  Denies new anxiety or depression issues.  No new skin problems.    Rehabilitation:   PT:    Bed mobility  Rolling to Right: Modified independent  Supine to Sit: Modified independent  Sit to Supine: Modified independent  Scooting: Modified independent  Bed Mobility Comments: HOB flat, use of railing for supine to sit and Rolling    Transfers  Sit to Stand: Modified independent  Stand to Sit: Modified independent  Bed to Chair: Modified independent  Stand Pivot Transfers: Modified independent  Squat Pivot Transfers: Stand by assistance (no AD , pt seeks UE support, VCs for placement)  Comment: increased time to complete functional transfers    Ambulation  Surface: Level tile  Device: Rolling Walker  Assistance: Modified Independent  Quality of Gait: decreased left step length , right stance time, steady gait, improved endurance  Gait Deviations: Slow Kaelyn, Decreased step length, Decreased step height, Decreased head and trunk rotation  Distance: 2MWT 215 feet; 254 feet  Comments: pt advances SC every 2 step bilateral , VCs and TC decraesed right lateral trunk flexion stance phase , pt reports mild fatigue at complation of distance. Pt alternates UE use right <> left as needed per pt direction.  More Ambulation?: Yes  Ambulation 2  Surface - 2: level tile, outdoors, ramp  Device 2: Single point 
Physical Medicine & Rehabilitation  Progress Note    6/27/2024 11:00 AM     CC: Ambulatory and ADL dysfunction due to encephalopathy secondary haemophilus influenza meningitis    Subjective:   No complaints today.  Seen in gym no complaints of headache today denies difficulty with bowels or bladder    ROS:  Denies fevers, chills, sweats.  No chest pain, palpitations, lightheadedness.  Denies coughing, wheezing or shortness of breath.  Denies abdominal pain, nausea, diarrhea or constipation.  No new areas of joint pain.  Denies new areas of numbness or weakness.  Denies new anxiety or depression issues.  No new skin problems.    Rehabilitation:   PT:    Bed mobility  Rolling to Right: Modified independent  Supine to Sit: Modified independent  Sit to Supine: Modified independent  Scooting: Modified independent  Bed Mobility Comments: HOB flat, use of railing for supine to sit and Rolling    Transfers  Sit to Stand: Modified independent  Stand to Sit: Modified independent  Bed to Chair: Modified independent  Stand Pivot Transfers: Modified independent  Squat Pivot Transfers: Stand by assistance (no AD , pt seeks UE support, VCs for placement)  Comment: increased time to complete functional transfers    Ambulation  Surface: Level tile  Device: Rolling Walker  Assistance: Modified Independent  Quality of Gait: decreased left step length , right stance time, steady gait, improved endurance  Gait Deviations: Slow Kaelyn, Decreased step length, Decreased step height, Decreased head and trunk rotation  Distance: 2MWT 215 feet; 254 feet  Comments: pt advances SC every 2 step bilateral , VCs and TC decraesed right lateral trunk flexion stance phase , pt reports mild fatigue at complation of distance. Pt alternates UE use right <> left as needed per pt direction.  More Ambulation?: Yes  Ambulation 2  Surface - 2: level tile, outdoors, ramp  Device 2: Single point cane  Assistance 2: Stand by assistance  Quality of Gait 2: 
Physical Therapy        Physical Therapy Cancel Note      DATE: 2024    NAME: Kavya De Santiago  MRN: [id number]   : 1960      Patient not seen this date for Physical Therapy due to:    Patient Declined: patient reporting intolerable headache and light sensitivity. Patient reported unable to tolerated out of bed safely. Upon arrival to pt room, patient in bed and pt wearing towel across eyes, room darkened.     Electronically signed by Kayla Monet PTA on 2024 at 11:30 AM   
Physical Therapy  Facility/Department: [facility] ACUTE REHAB  Rehabilitation Physical Therapy Progress Note    NAME: Kavya De Santiago  : 1960 (64 y.o.)  MRN: [id number]  CODE STATUS: Full Code    Date of Service: 24      Past Medical History:   Diagnosis Date    Cervical spondylosis 2009    c-4 c-5, c-5 c-6, c-6 c-7    CKD (chronic kidney disease)     per pt, does not have nephrologist    COVID-19 2021    nasal congestion, fatique and myalgia x 3 weeks    Fall     PATIENT FELL 2 WEEKS AGO LANDED ON TAILBONE    Generalized anxiety disorder 2020    GERD (gastroesophageal reflux disease)     History of recent hospitalization 2022    til 10/9/22 at [facility]'s    History of swelling of feet     lasix QOD for this    Hyperlipidemia     Hypertension     Left hand weakness     and pain, can not make fist d/t cubital tunnel issue    Lumbar radiculopathy 11/10/2021    was following with pain management and has had epidural injections    DIEGO (nonalcoholic steatohepatitis) 10/12/2014    Obesity     Osteoarthritis of left knee 2014    Recurrent major depressive disorder (HCC) 07/15/2022    Restless legs syndrome     Snores 2022    was seen by Dr. Asencio for likely sleep apnea while hospitalized , to follow up as OP for sleep study, has not been done    TIA (transient ischemic attack)     no residual symptoms    Type II or unspecified type diabetes mellitus without mention of complication, not stated as uncontrolled     Wears glasses     Wellness examination     PCP, Dr. Hamilton, last seen     Past Surgical History:   Procedure Laterality Date    CARPAL TUNNEL RELEASE Left 2022    CUBITAL TUNNEL DECOMPRESSION performed by Candice Harvey DO at [facility] OR     SECTION      x2    COLONOSCOPY  2012    ? polyps per pt    HYSTERECTOMY, TOTAL ABDOMINAL (CERVIX REMOVED)      with BSO    OTHER SURGICAL HISTORY Left 2022    cubital tunnel release    SHOULDER SURGERY Left 
Physical Therapy  Facility/Department: [facility] ACUTE REHAB  Rehabilitation Physical Therapy     NAME: Kavya De Santiago  : 1960 (64 y.o.)  MRN: [id number]  CODE STATUS: Full Code    Date of Service: 24      Past Medical History:   Diagnosis Date    Cervical spondylosis 2009    c-4 c-5, c-5 c-6, c-6 c-7    CKD (chronic kidney disease)     per pt, does not have nephrologist    COVID-19 2021    nasal congestion, fatique and myalgia x 3 weeks    Fall     PATIENT FELL 2 WEEKS AGO L[place]ED ON [address]    Generalized anxiety disorder 2020    GERD (gastroesophageal reflux disease)     History of recent hospitalization 2022    til 10/9/22 at [facility]    History of swelling of feet     lasix QOD for this    Hyperlipidemia     Hypertension     Left hand weakness     and pain, can not make fist d/t cubital tunnel issue    Lumbar radiculopathy 11/10/2021    was following with pain management and has had epidural injections    DIEGO (nonalcoholic steatohepatitis) 10/12/2014    Obesity     Osteoarthritis of left knee 2014    Recurrent major depressive disorder (HCC) 07/15/2022    Restless legs syndrome     Snores 2022    was seen by Dr. Asencio for likely sleep apnea while hospitalized , to follow up as OP for sleep study, has not been done    TIA (transient ischemic attack)     no residual symptoms    Type II or unspecified type diabetes mellitus without mention of complication, not stated as uncontrolled     Wears glasses     Wellness examination     PCP, Dr. Hamilton, last seen     Past Surgical History:   Procedure Laterality Date    CARPAL TUNNEL RELEASE Left 2022    CUBITAL TUNNEL DECOMPRESSION performed by Candice Harvey DO at [facility] OR     SECTION      x2    COLONOSCOPY  2012    ? polyps per pt    HYSTERECTOMY, TOTAL ABDOMINAL (CERVIX REMOVED)      with BSO    OTHER SURGICAL HISTORY Left 2022    cubital tunnel release    SHOULDER SURGERY Left 2021 
Physical Therapy  Facility/Department: [facility] ACUTE REHAB  Rehabilitation Physical Therapy    NAME: Kavya De Santiago  : 1960 (64 y.o.)  MRN: [id number]  CODE STATUS: Full Code    Date of Service: 24      Past Medical History:   Diagnosis Date    Cervical spondylosis 2009    c-4 c-5, c-5 c-6, c-6 c-7    CKD (chronic kidney disease)     per pt, does not have nephrologist    COVID-19 2021    nasal congestion, fatique and myalgia x 3 weeks    Fall     PATIENT FELL 2 WEEKS AGO [place]ED ON [address]    Generalized anxiety disorder 2020    GERD (gastroesophageal reflux disease)     History of recent hospitalization 2022    til 10/9/22 at [facility]'s    History of swelling of feet     lasix QOD for this    Hyperlipidemia     Hypertension     Left hand weakness     and pain, can not make fist d/t cubital tunnel issue    Lumbar radiculopathy 11/10/2021    was following with pain management and has had epidural injections    DIEGO (nonalcoholic steatohepatitis) 10/12/2014    Obesity     Osteoarthritis of left knee 2014    Recurrent major depressive disorder (HCC) 07/15/2022    Restless legs syndrome     Snores 2022    was seen by Dr. Asencio for likely sleep apnea while hospitalized , to follow up as OP for sleep study, has not been done    TIA (transient ischemic attack)     no residual symptoms    Type II or unspecified type diabetes mellitus without mention of complication, not stated as uncontrolled     Wears glasses     Wellness examination     PCP, Dr. Hamilton, last seen     Past Surgical History:   Procedure Laterality Date    CARPAL TUNNEL RELEASE Left 2022    CUBITAL TUNNEL DECOMPRESSION performed by Candice Harvey DO at [facility] OR     SECTION      x2    COLONOSCOPY  2012    ? polyps per pt    HYSTERECTOMY, TOTAL ABDOMINAL (CERVIX REMOVED)      with BSO    OTHER SURGICAL HISTORY Left 2022    cubital tunnel release    SHOULDER SURGERY Left 2021    
Physical Therapy  Facility/Department: [facility] ACUTE REHAB  Rehabilitation Physical Therapy Progress Note     NAME: Kavya De Santiago  : 1960 (64 y.o.)  MRN: [id number]  CODE STATUS: Full Code    Date of Service: 24        Additional Pertinent Hx: Pt with history of type 2 diabetes, hypertension hyperlipidemia, CKD, GERD, DIEGO, obesity admitted with change in mental status, nausea vomiting diarrhea.  She required intubation reportedly patient had some upper respiratory symptoms over the past week with dry cough decreased appetite rhinorrhea congestion headache and sinus pain.  She then began to have some diarrhea and vomiting..  EMS noted GCS of 89 decreased O2 sat.  She was intubated CT chest showed no PE CT abdomen pelvis showed pancolitis with abnormal wall thickening of the ascending transverse descending colon sigmoid colon and rectum she was started antibiotic she had elevated glucose influenza was negative  Family / Caregiver Present: No  Referring Practitioner: Jose Moran MD  Referral Date : 24  Diagnosis: Subacute infarct left frontal lobe, encephalopathy/meningitis  Other (Comment): Pt is R hand Dominant  General Comment  Comments: Fluid IV AM/PM during treatment    Restrictions:  Restrictions/Precautions: Fall Risk;General Precautions     SUBJECTIVE  Subjective: Pt states has RW at home, please verify with spouse by  for possible DME order .  Pain: cervical, headache, ears /10     OBJECTIVE  Vision  Vision: Impaired  Vision Exceptions: Wears glasses at all times    Hearing  Hearing: Exceptions to WFL  Hearing Exceptions: Hard of hearing/hearing concerns;No hearing aid    Cognition  Overall Cognitive Status: Exceptions  Arousal/Alertness: Delayed responses to stimuli;Inconsistent responses to stimuli  Following Commands: Follows one step commands with increased time;Follows one step commands with repetition  Attention Span: Difficulty attending to directions  Memory: Decreased 
Physical Therapy  Facility/Department: [facility] ACUTE REHAB  Rehabilitation Physical Therapy     NAME: Kavya De Santiago  : 1960 (64 y.o.)  MRN: [id number]  CODE STATUS: Full Code    Date of Service: 24      Past Medical History:   Diagnosis Date    Cervical spondylosis 2009    c-4 c-5, c-5 c-6, c-6 c-7    CKD (chronic kidney disease)     per pt, does not have nephrologist    COVID-19 2021    nasal congestion, fatique and myalgia x 3 weeks    Fall     PATIENT FELL 2 WEEKS AGO LANDED ON [address]    Generalized anxiety disorder 2020    GERD (gastroesophageal reflux disease)     History of recent hospitalization 2022    til 10/9/22 at [facility]'s    History of swelling of feet     lasix QOD for this    Hyperlipidemia     Hypertension     Left hand weakness     and pain, can not make fist d/t cubital tunnel issue    Lumbar radiculopathy 11/10/2021    was following with pain management and has had epidural injections    DIEGO (nonalcoholic steatohepatitis) 10/12/2014    Obesity     Osteoarthritis of left knee 2014    Recurrent major depressive disorder (HCC) 07/15/2022    Restless legs syndrome     Snores 2022    was seen by Dr. Asencio for likely sleep apnea while hospitalized , to follow up as OP for sleep study, has not been done    TIA (transient ischemic attack)     no residual symptoms    Type II or unspecified type diabetes mellitus without mention of complication, not stated as uncontrolled     Wears glasses     Wellness examination     PCP, Dr. Hamilton, last seen     Past Surgical History:   Procedure Laterality Date    CARPAL TUNNEL RELEASE Left 2022    CUBITAL TUNNEL DECOMPRESSION performed by Candice Harvey DO at [facility] OR     SECTION      x2    COLONOSCOPY  2012    ? polyps per pt    HYSTERECTOMY, TOTAL ABDOMINAL (CERVIX REMOVED)      with BSO    OTHER SURGICAL HISTORY Left 2022    cubital tunnel release    SHOULDER SURGERY Left 2021 
Physical Therapy  Facility/Department: [facility] ACUTE REHAB  Rehabilitation Physical Therapy Progress Note     NAME: Kavya De Santiago  : 1960 (64 y.o.)  MRN: [id number]  CODE STATUS: Prior    Date of Service: 24    Additional Pertinent Hx: Pt with history of type 2 diabetes, hypertension hyperlipidemia, CKD, GERD, DIEGO, obesity admitted with change in mental status, nausea vomiting diarrhea.  She required intubation reportedly patient had some upper respiratory symptoms over the past week with dry cough decreased appetite rhinorrhea congestion headache and sinus pain.  She then began to have some diarrhea and vomiting..  EMS noted GCS of 89 decreased O2 sat.  She was intubated CT chest showed no PE CT abdomen pelvis showed pancolitis with abnormal wall thickening of the ascending transverse descending colon sigmoid colon and rectum she was started antibiotic she had elevated glucose influenza was negative  Family / Caregiver Present: Yes (spouse PM)  Referring Practitioner: Jose Moran MD  Referral Date : 24  Diagnosis: Subacute infarct left frontal lobe, encephalopathy/meningitis  Other (Comment): Pt is R hand Dominant    Restrictions:  Restrictions/Precautions: Fall Risk;General Precautions     SUBJECTIVE  Subjective: Pt reports continued fatigue due to poor sleep 2 days ago.  Pain: cervical and headache 5/10       OBJECTIVE  Vision  Vision: Impaired  Vision Exceptions: Wears glasses at all times    Hearing  Hearing: Exceptions to WFL  Hearing Exceptions: Hard of hearing/hearing concerns;No hearing aid    Cognition  Overall Cognitive Status: Exceptions  Arousal/Alertness: Delayed responses to stimuli;Inconsistent responses to stimuli  Following Commands: Follows one step commands with increased time;Follows one step commands with repetition  Attention Span: Difficulty attending to directions  Memory: Decreased short term memory;Decreased recall of recent events  Safety Judgement: 
Physical Therapy  Facility/Department: [facility] ACUTE REHAB  Rehabilitation Physical Therapy     NAME: Kavya De Santiago  : 1960 (64 y.o.)  MRN: [id number]  CODE STATUS: Full Code    Date of Service: 24      Past Medical History:   Diagnosis Date    Cervical spondylosis 2009    c-4 c-5, c-5 c-6, c-6 c-7    CKD (chronic kidney disease)     per pt, does not have nephrologist    COVID-19 2021    nasal congestion, fatique and myalgia x 3 weeks    Fall     PATIENT FELL 2 WEEKS AGO LANDED ON [address]    Generalized anxiety disorder 2020    GERD (gastroesophageal reflux disease)     History of recent hospitalization 2022    til 10/9/22 at [facility]'s    History of swelling of feet     lasix QOD for this    Hyperlipidemia     Hypertension     Left hand weakness     and pain, can not make fist d/t cubital tunnel issue    Lumbar radiculopathy 11/10/2021    was following with pain management and has had epidural injections    DIEGO (nonalcoholic steatohepatitis) 10/12/2014    Obesity     Osteoarthritis of left knee 2014    Recurrent major depressive disorder (HCC) 07/15/2022    Restless legs syndrome     Snores 2022    was seen by Dr. Asencio for likely sleep apnea while hospitalized , to follow up as OP for sleep study, has not been done    TIA (transient ischemic attack)     no residual symptoms    Type II or unspecified type diabetes mellitus without mention of complication, not stated as uncontrolled     Wears glasses     Wellness examination     PCP, Dr. Hamilton, last seen     Past Surgical History:   Procedure Laterality Date    CARPAL TUNNEL RELEASE Left 2022    CUBITAL TUNNEL DECOMPRESSION performed by Candice Harvey DO at [facility] OR     SECTION      x2    COLONOSCOPY  2012    ? polyps per pt    HYSTERECTOMY, TOTAL ABDOMINAL (CERVIX REMOVED)      with BSO    OTHER SURGICAL HISTORY Left 2022    cubital tunnel release    SHOULDER SURGERY Left 2021    
Physical Therapy  Facility/Department: [facility] ACUTE REHAB  Rehabilitation Physical Therapy Initial Assessment    NAME: Kavya De Santiago  : 1960 (64 y.o.)  MRN: [id number]  CODE STATUS: Prior    Date of Service: 6/15/24      Past Medical History:   Diagnosis Date    Cervical spondylosis 2009    c-4 c-5, c-5 c-6, c-6 c-7    CKD (chronic kidney disease)     per pt, does not have nephrologist    COVID-19 2021    nasal congestion, fatique and myalgia x 3 weeks    Fall     PATIENT FELL 2 WEEKS AGO LANDED ON [address]    Generalized anxiety disorder 2020    GERD (gastroesophageal reflux disease)     History of recent hospitalization 2022    til 10/9/22 at [facility]'s    History of swelling of feet     lasix QOD for this    Hyperlipidemia     Hypertension     Left hand weakness     and pain, can not make fist d/t cubital tunnel issue    Lumbar radiculopathy 11/10/2021    was following with pain management and has had epidural injections    DIEGO (nonalcoholic steatohepatitis) 10/12/2014    Obesity     Osteoarthritis of left knee 2014    Recurrent major depressive disorder (HCC) 07/15/2022    Restless legs syndrome     Snores 2022    was seen by Dr. Asencio for likely sleep apnea while hospitalized , to follow up as OP for sleep study, has not been done    TIA (transient ischemic attack)     no residual symptoms    Type II or unspecified type diabetes mellitus without mention of complication, not stated as uncontrolled     Wears glasses     Wellness examination     PCP, Dr. Hamilton, last seen     Past Surgical History:   Procedure Laterality Date    CARPAL TUNNEL RELEASE Left 2022    CUBITAL TUNNEL DECOMPRESSION performed by Candice Harvey DO at [facility] OR     SECTION      x2    COLONOSCOPY  2012    ? polyps per pt    HYSTERECTOMY, TOTAL ABDOMINAL (CERVIX REMOVED)      with BSO    OTHER SURGICAL HISTORY Left 2022    cubital tunnel release    SHOULDER SURGERY 
Physical Therapy  Facility/Department: [facility] ACUTE REHAB  Rehabilitation Physical Therapy Progress Note     NAME: Kavya De Santiago  : 1960 (64 y.o.)  MRN: [id number]  CODE STATUS: Full Code    Date of Service: 24      Past Medical History:   Diagnosis Date    Cervical spondylosis 2009    c-4 c-5, c-5 c-6, c-6 c-7    CKD (chronic kidney disease)     per pt, does not have nephrologist    COVID-19 2021    nasal congestion, fatique and myalgia x 3 weeks    Fall     PATIENT FELL 2 WEEKS AGO LANDED ON TAILBONE    Generalized anxiety disorder 2020    GERD (gastroesophageal reflux disease)     History of recent hospitalization 2022    til 10/9/22 at [facility]'s    History of swelling of feet     lasix QOD for this    Hyperlipidemia     Hypertension     Left hand weakness     and pain, can not make fist d/t cubital tunnel issue    Lumbar radiculopathy 11/10/2021    was following with pain management and has had epidural injections    DIEGO (nonalcoholic steatohepatitis) 10/12/2014    Obesity     Osteoarthritis of left knee 2014    Recurrent major depressive disorder (HCC) 07/15/2022    Restless legs syndrome     Snores 2022    was seen by Dr. Asencio for likely sleep apnea while hospitalized , to follow up as OP for sleep study, has not been done    TIA (transient ischemic attack)     no residual symptoms    Type II or unspecified type diabetes mellitus without mention of complication, not stated as uncontrolled     Wears glasses     Wellness examination     PCP, Dr. Hamilton, last seen     Past Surgical History:   Procedure Laterality Date    CARPAL TUNNEL RELEASE Left 2022    CUBITAL TUNNEL DECOMPRESSION performed by Candice Harvey DO at [facility] OR     SECTION      x2    COLONOSCOPY  2012    ? polyps per pt    HYSTERECTOMY, TOTAL ABDOMINAL (CERVIX REMOVED)      with BSO    OTHER SURGICAL HISTORY Left 2022    cubital tunnel release    SHOULDER SURGERY 
Physical Therapy  Facility/Department: [facility] ACUTE REHAB  Rehabilitation Physical Therapy     NAME: Kavya De Santiago  : 1960 (64 y.o.)  MRN: [id number]  CODE STATUS: Full Code    Date of Service: 24      Past Medical History:   Diagnosis Date    Cervical spondylosis 2009    c-4 c-5, c-5 c-6, c-6 c-7    CKD (chronic kidney disease)     per pt, does not have nephrologist    COVID-19 2021    nasal congestion, fatique and myalgia x 3 weeks    Fall     [place]    Generalized anxiety disorder 2020    GERD (gastroesophageal reflux disease)     History of recent hospitalization 2022    til 10/9/22 at [facility]'s    History of swelling of feet     lasix QOD for this    Hyperlipidemia     Hypertension     Left hand weakness     and pain, can not make fist d/t cubital tunnel issue    Lumbar radiculopathy 11/10/2021    was following with pain management and has had epidural injections    DIEGO (nonalcoholic steatohepatitis) 10/12/2014    Obesity     Osteoarthritis of left knee 2014    Recurrent major depressive disorder (HCC) 07/15/2022    Restless legs syndrome     Snores 2022    was seen by Dr. Asencio for likely sleep apnea while hospitalized , to follow up as OP for sleep study, has not been done    TIA (transient ischemic attack)     no residual symptoms    Type II or unspecified type diabetes mellitus without mention of complication, not stated as uncontrolled     Wears glasses     Wellness examination     PCP, Dr. Hamilton, last seen     Past Surgical History:   Procedure Laterality Date    CARPAL TUNNEL RELEASE Left 2022    CUBITAL TUNNEL DECOMPRESSION performed by Candice Harvey DO at [facility] OR     SECTION      x2    COLONOSCOPY  2012    ? polyps per pt    HYSTERECTOMY, TOTAL ABDOMINAL (CERVIX REMOVED)      with BSO    OTHER SURGICAL HISTORY Left 2022    cubital tunnel release    SHOULDER SURGERY Left 2021 
Physical Therapy  Facility/Department: [facility] ACUTE REHAB  Rehabilitation Physical Therapy Treatment Note    NAME: Kavya De Santiago  : 1960 (64 y.o.)  MRN: [id number]  CODE STATUS: Prior    Date of Service: 24      Past Medical History:   Diagnosis Date    Cervical spondylosis 2009    c-4 c-5, c-5 c-6, c-6 c-7    CKD (chronic kidney disease)     per pt, does not have nephrologist    COVID-19 2021    nasal congestion, fatique and myalgia x 3 weeks    Fall     PAT[place]    Generalized anxiety disorder 2020    GERD (gastroesophageal reflux disease)     History of recent hospitalization 2022    til 10/9/22 at [facility]'s    History of swelling of feet     lasix QOD for this    Hyperlipidemia     Hypertension     Left hand weakness     and pain, can not make fist d/t cubital tunnel issue    Lumbar radiculopathy 11/10/2021    was following with pain management and has had epidural injections    DIEGO (nonalcoholic steatohepatitis) 10/12/2014    Obesity     Osteoarthritis of left knee 2014    Recurrent major depressive disorder (HCC) 07/15/2022    Restless legs syndrome     Snores 2022    was seen by Dr. Asencio for likely sleep apnea while hospitalized , to follow up as OP for sleep study, has not been done    TIA (transient ischemic attack)     no residual symptoms    Type II or unspecified type diabetes mellitus without mention of complication, not stated as uncontrolled     Wears glasses     Wellness examination     PCP, Dr. Hamilton, last seen     Past Surgical History:   Procedure Laterality Date    CARPAL TUNNEL RELEASE Left 2022    CUBITAL TUNNEL DECOMPRESSION performed by Candice Harvey DO at [facility] OR     SECTION      x2    COLONOSCOPY  2012    ? polyps per pt    HYSTERECTOMY, TOTAL ABDOMINAL (CERVIX REMOVED)      with BSO    OTHER SURGICAL HISTORY Left 2022    cubital tunnel release    SHOULDER SURGERY Left 
Physical Therapy  Facility/Department: [facility] ACUTE REHAB  Rehabilitation Physical Therapy Progress Note   NAME: Kavya De Santiago  : 1960 (64 y.o.)  MRN: [id number]  CODE STATUS: Full Code    Date of Service: 24      Additional Pertinent Hx: Pt with history of type 2 diabetes, hypertension hyperlipidemia, CKD, GERD, DIEGO, obesity admitted with change in mental status, nausea vomiting diarrhea.  She required intubation reportedly patient had some upper respiratory symptoms over the past week with dry cough decreased appetite rhinorrhea congestion headache and sinus pain.  She then began to have some diarrhea and vomiting..  EMS noted GCS of 89 decreased O2 sat.  She was intubated CT chest showed no PE CT abdomen pelvis showed pancolitis with abnormal wall thickening of the ascending transverse descending colon sigmoid colon and rectum she was started antibiotic she had elevated glucose influenza was negative  Family / Caregiver Present: No  Referring Practitioner: Jose Moran MD  Referral Date : 24  Diagnosis: Subacute infarct left frontal lobe, encephalopathy/meningitis  Other (Comment): Pt is R hand Dominant  General Comment  Comments: Pt reports pain head , ears, jaw 9/10 AM, 10/10 PM , pt requesting supine treatment due to pain levels, RN Freddy aware, approves treatment as tolerated. Dependant donning compression stockings AM .    Restrictions:  Restrictions/Precautions: Fall Risk;General Precautions     SUBJECTIVE  Pain: cervical, headache, ears 9-10/10      OBJECTIVE  Vision  Vision: Impaired  Vision Exceptions: Wears glasses at all times    Hearing  Hearing: Exceptions to WFL  Hearing Exceptions: Hard of hearing/hearing concerns;No hearing aid    Cognition  Overall Cognitive Status: Exceptions  Arousal/Alertness: Delayed responses to stimuli;Inconsistent responses to stimuli  Following Commands: Follows one step commands with increased time;Follows one step commands with 
Physical Therapy  Facility/Department: [facility] ACUTE REHAB  Rehabilitation Physical Therapy     NAME: Kavya De Santiago  : 1960 (64 y.o.)  MRN: [id number]  CODE STATUS: Full Code    Date of Service: 24      Past Medical History:   Diagnosis Date    Cervical spondylosis 2009    c-4 c-5, c-5 c-6, c-6 c-7    CKD (chronic kidney disease)     per pt, does not have nephrologist    COVID-19 2021    nasal congestion, fatique and myalgia x 3 weeks    Fall     PATIENT FELL 2 WEEKS AGO [place] [email or web address]    Generalized anxiety disorder 2020    GERD (gastroesophageal reflux disease)     History of recent hospitalization 2022    til 10/9/22 at [facility]'s    History of swelling of feet     lasix QOD for this    Hyperlipidemia     Hypertension     Left hand weakness     and pain, can not make fist d/t cubital tunnel issue    Lumbar radiculopathy 11/10/2021    was following with pain management and has had epidural injections    DIEGO (nonalcoholic steatohepatitis) 10/12/2014    Obesity     Osteoarthritis of left knee 2014    Recurrent major depressive disorder (HCC) 07/15/2022    Restless legs syndrome     Snores 2022    was seen by Dr. Asencio for likely sleep apnea while hospitalized , to follow up as OP for sleep study, has not been done    TIA (transient ischemic attack)     no residual symptoms    Type II or unspecified type diabetes mellitus without mention of complication, not stated as uncontrolled     Wears glasses     Wellness examination     PCP, Dr. Hamilton, last seen     Past Surgical History:   Procedure Laterality Date    CARPAL TUNNEL RELEASE Left 2022    CUBITAL TUNNEL DECOMPRESSION performed by Candice Harvey DO at [facility] OR     SECTION      x2    COLONOSCOPY  2012    ? polyps per pt    HYSTERECTOMY, TOTAL ABDOMINAL (CERVIX REMOVED)      with BSO    OTHER SURGICAL HISTORY Left 2022    cubital tunnel release    SHOULDER SURGERY Left 2021 
Pt and her  updated writer. Pt looking and feeling much better and stronger. Hoping to be d/c in next few days.    06/27/24 2006   Encounter Summary   Encounter Overview/Reason Spiritual/Emotional Needs   Service Provided For Patient and family together   [facility]   Support System Spouse;Children;[employer] day/zion community   Last Encounter  06/27/24   Complexity of Encounter Moderate   Spiritual/Emotional needs   Type Spiritual Support   Assessment/Intervention/Outcome   Assessment Calm   Intervention Active listening;Discussed illness injury and it’s impact;Explored/Affirmed feelings, thoughts, concerns;Prayer (assurance of)/[place];Sustaining Presence/Ministry of presence   Outcome Engaged in conversation;Expressed feelings, needs, and concerns;Coping;Expressed Gratitude;Receptive       
Pt blood sugar 171  
Pt blood sugar 243 with sandee this morning  
Pt blood sugar 256.   
Pt moves sufficiently in the chair unassisted.  
Pt much less confused and using device to aid in hearing so she could interact more readily. Pt is optimistic about therapy and looking forward to getting back to home and her activities at [employer].    06/15/24 1451   Encounter Summary   Encounter Overview/Reason Spiritual/Emotional Needs   Service Provided For Patient   Referral/Consult From [facility]   Last Encounter  06/15/24   Complexity of Encounter Moderate   Begin Time 1215   End Time  1235   Total Time Calculated 20 min   Spiritual/Emotional needs   Type Spiritual Support   Assessment/Intervention/Outcome   Assessment Calm   Intervention Active listening;Discussed illness injury and it’s impact;Explored/Affirmed feelings, thoughts, concerns;Explored Coping Skills/Resources;Prayer (assurance of)/[place];Sustaining Presence/Ministry of presence   Outcome Engaged in conversation;Expressed feelings, needs, and concerns;Expressed Gratitude;Receptive;Optimistic       
Pt reports per Clifford blood sugar readin 197  160 226  
Pt. Blood sugar is 277  
Pt. Blood sugar was 174   
SLP ALL NOTES  Speech Language Pathology  [facility] ACUTE REHAB    Cognitive Treatment Note    Date: 6/17/2024  Patient’s Name: Kavya De Santiago  MRN: [id number]  Diagnosis:   Patient Active Problem List   Diagnosis Code    Cervical spondylosis M47.812    Type 2 diabetes mellitus without complication, without long-term current use of insulin ([facility]) E11.9    Hypertension I10    Abnormal auditory perception H93.299    Nasal polyps J33.9    Osteoarthritis of left knee M17.12    Osteoarthritis of right knee M17.11    DIEGO (nonalcoholic steatohepatitis) K75.81    Vitamin D deficiency E55.9    Iron deficiency anemia D50.9    Dyslipidemia E78.5    Trochanteric bursitis M70.60    Chronic left shoulder pain M25.512, G89.29    Restless legs syndrome G25.81    Generalized anxiety disorder F41.1    Acute encephalopathy G93.40    Class 2 obesity in adult E66.9    Leg edema R60.0    Recurrent major depressive disorder ([facility]) F33.9    Dermatitis L30.9    Retrolisthesis of vertebrae M43.10    Bilateral carpal tunnel syndrome G56.03    Intention tremor G25.2    Sciatica M54.30    Back pain with radiation M54.9    Left sciatic nerve pain M54.32    Lumbar disc disease with radiculopathy M51.16    Intractable back pain M54.9    Piriformis syndrome of left side G57.02    Ulnar neuropathy at elbow, left G56.22    S/P decompression of ulnar nerve at elbow Z98.890    Cervical myelopathy with cervical radiculopathy ([facility]) G95.9, M54.12    Lumbar spondylosis M47.816    Right median nerve neuropathy G56.11    Sacroiliitis ([facility]) M46.1    Secondary diabetes mellitus with stage 2 chronic kidney disease (GFR 60-89) ([facility]) E13.22, N18.2    Benign hypertension with CKD (chronic kidney disease) stage III ([facility]) I12.9, N18.30    Severe obesity (BMI 35.0-39.9) with comorbidity ([facility]) E66.01    Stenosis of lateral recess of lumbosacral spine M48.07    Type 2 diabetes mellitus with chronic kidney disease E11.22    Sacroiliac joint pain M53.3    Type 2 diabetes 
SPEECH LANGUAGE PATHOLOGY  Facility/Department: [facility] ACUTE REHAB  Initial Speech/Language/Cognitive Assessment    NAME: Kavya De Santiago  : 1960   MRN: [id number]  ADMISSION DATE: 2024  ADMITTING DIAGNOSIS: has Cervical spondylosis; Type 2 diabetes mellitus without complication, without long-term current use of insulin ([facility]); Hypertension; Abnormal auditory perception; Nasal polyps; Osteoarthritis of left knee; Osteoarthritis of right knee; DIEGO (nonalcoholic steatohepatitis); Vitamin D deficiency; Iron deficiency anemia; Dyslipidemia; Trochanteric bursitis; Chronic left shoulder pain; Restless legs syndrome; Generalized anxiety disorder; Acute encephalopathy; Class 2 obesity in adult; Leg edema; Recurrent major depressive disorder ([facility]); Dermatitis; Retrolisthesis of vertebrae; Bilateral carpal tunnel syndrome; Intention tremor; Sciatica; Back pain with radiation; Left sciatic nerve pain; Lumbar disc disease with radiculopathy; Intractable back pain; Piriformis syndrome of left side; Ulnar neuropathy at elbow, left; S/P decompression of ulnar nerve at elbow; Cervical myelopathy with cervical radiculopathy ([facility]); Lumbar spondylosis; Right median nerve neuropathy; Sacroiliitis ([facility]); Secondary diabetes mellitus with stage 2 chronic kidney disease (GFR 60-89) ([facility]); Benign hypertension with CKD (chronic kidney disease) stage III ([facility]); Severe obesity (BMI 35.0-39.9) with comorbidity ([facility]); Stenosis of lateral recess of lumbosacral spine; Type 2 diabetes mellitus with chronic kidney disease; Sacroiliac joint pain; Type 2 diabetes mellitus with hyperglycemia; Severe diarrhea; Pancolitis ([facility]); Fever; Acute respiratory failure ([facility]); Colitis; Haemophilus influenzae meningitis; Bacteremia; Sepsis due to Haemophilus influenzae with acute respiratory failure without septic shock ([facility]); Anemia; Elevated LFTs; Unresponsive state; Meningoencephalitis; History of TIA (transient ischemic attack); Cerebrovascular 
SPEECH LANGUAGE PATHOLOGY  Speech Language Pathology  [facility] ACUTE REHAB    Cognitive Treatment Note    Date: 6/20/2024  Patient’s Name: Kavya De Santiago  MRN: [id number]  Diagnosis:   Patient Active Problem List   Diagnosis Code    Cervical spondylosis M47.812    Type 2 diabetes mellitus without complication, without long-term current use of insulin ([facility]) E11.9    Hypertension I10    Abnormal auditory perception H93.299    Nasal polyps J33.9    Osteoarthritis of left knee M17.12    Osteoarthritis of right knee M17.11    DIEGO (nonalcoholic steatohepatitis) K75.81    Vitamin D deficiency E55.9    Iron deficiency anemia D50.9    Dyslipidemia E78.5    Trochanteric bursitis M70.60    Chronic left shoulder pain M25.512, G89.29    Restless legs syndrome G25.81    Generalized anxiety disorder F41.1    Acute encephalopathy G93.40    Class 2 obesity in adult E66.9    Leg edema R60.0    Recurrent major depressive disorder ([facility]) F33.9    Dermatitis L30.9    Retrolisthesis of vertebrae M43.10    Bilateral carpal tunnel syndrome G56.03    Intention tremor G25.2    Sciatica M54.30    Back pain with radiation M54.9    Left sciatic nerve pain M54.32    Lumbar disc disease with radiculopathy M51.16    Intractable back pain M54.9    Piriformis syndrome of left side G57.02    Ulnar neuropathy at elbow, left G56.22    S/P decompression of ulnar nerve at elbow Z98.890    Cervical myelopathy with cervical radiculopathy ([facility]) G95.9, M54.12    Lumbar spondylosis M47.816    Right median nerve neuropathy G56.11    Sacroiliitis ([facility]) M46.1    Secondary diabetes mellitus with stage 2 chronic kidney disease (GFR 60-89) ([facility]) E13.22, N18.2    Benign hypertension with CKD (chronic kidney disease) stage III ([facility]) I12.9, N18.30    Severe obesity (BMI 35.0-39.9) with comorbidity ([facility]) E66.01    Stenosis of lateral recess of lumbosacral spine M48.07    Type 2 diabetes mellitus with chronic kidney disease E11.22    Sacroiliac joint pain M53.3    Type 2 
SPEECH LANGUAGE PATHOLOGY  Speech Language Pathology  [facility] ACUTE REHAB    Cognitive Treatment Note    Date: 7/1/2024  Patient’s Name: Kavya De Santiago  MRN: [id number]  Diagnosis:   Patient Active Problem List   Diagnosis Code    Cervical spondylosis M47.812    Type 2 diabetes mellitus without complication, without long-term current use of insulin ([facility]) E11.9    Hypertension I10    Abnormal auditory perception H93.299    Nasal polyps J33.9    Osteoarthritis of left knee M17.12    Osteoarthritis of right knee M17.11    DIEGO (nonalcoholic steatohepatitis) K75.81    Vitamin D deficiency E55.9    Iron deficiency anemia D50.9    Dyslipidemia E78.5    Trochanteric bursitis M70.60    Chronic left shoulder pain M25.512, G89.29    Restless legs syndrome G25.81    Generalized anxiety disorder F41.1    Acute encephalopathy G93.40    Class 2 obesity in adult E66.9    Leg edema R60.0    Recurrent major depressive disorder ([facility]) F33.9    Dermatitis L30.9    Retrolisthesis of vertebrae M43.10    Bilateral carpal tunnel syndrome G56.03    Intention tremor G25.2    Sciatica M54.30    Back pain with radiation M54.9    Left sciatic nerve pain M54.32    Lumbar disc disease with radiculopathy M51.16    Intractable back pain M54.9    Piriformis syndrome of left side G57.02    Ulnar neuropathy at elbow, left G56.22    S/P decompression of ulnar nerve at elbow Z98.890    Cervical myelopathy with cervical radiculopathy ([facility]) G95.9, M54.12    Lumbar spondylosis M47.816    Right median nerve neuropathy G56.11    Sacroiliitis ([facility]) M46.1    Secondary diabetes mellitus with stage 2 chronic kidney disease (GFR 60-89) ([facility]) E13.22, N18.2    Benign hypertension with CKD (chronic kidney disease) stage III ([facility]) I12.9, N18.30    Severe obesity (BMI 35.0-39.9) with comorbidity ([facility]) E66.01    Stenosis of lateral recess of lumbosacral spine M48.07    Type 2 diabetes mellitus with chronic kidney disease E11.22    Sacroiliac joint pain M53.3    Type 2 
SPEECH LANGUAGE PATHOLOGY  Speech Language Pathology  [facility] ACUTE REHAB    Cognitive Treatment Note    Date: 6/28/2024  Patient’s Name: Kavya De Santiago  MRN: [id number]  Diagnosis:   Patient Active Problem List   Diagnosis Code    Cervical spondylosis M47.812    Type 2 diabetes mellitus without complication, without long-term current use of insulin ([facility]) E11.9    Hypertension I10    Abnormal auditory perception H93.299    Nasal polyps J33.9    Osteoarthritis of left knee M17.12    Osteoarthritis of right knee M17.11    DIEGO (nonalcoholic steatohepatitis) K75.81    Vitamin D deficiency E55.9    Iron deficiency anemia D50.9    Dyslipidemia E78.5    Trochanteric bursitis M70.60    Chronic left shoulder pain M25.512, G89.29    Restless legs syndrome G25.81    Generalized anxiety disorder F41.1    Acute encephalopathy G93.40    Class 2 obesity in adult E66.9    Leg edema R60.0    Recurrent major depressive disorder ([facility]) F33.9    Dermatitis L30.9    Retrolisthesis of vertebrae M43.10    Bilateral carpal tunnel syndrome G56.03    Intention tremor G25.2    Sciatica M54.30    Back pain with radiation M54.9    Left sciatic nerve pain M54.32    Lumbar disc disease with radiculopathy M51.16    Intractable back pain M54.9    Piriformis syndrome of left side G57.02    Ulnar neuropathy at elbow, left G56.22    S/P decompression of ulnar nerve at elbow Z98.890    Cervical myelopathy with cervical radiculopathy ([facility]) G95.9, M54.12    Lumbar spondylosis M47.816    Right median nerve neuropathy G56.11    Sacroiliitis ([facility]) M46.1    Secondary diabetes mellitus with stage 2 chronic kidney disease (GFR 60-89) ([facility]) E13.22, N18.2    Benign hypertension with CKD (chronic kidney disease) stage III ([facility]) I12.9, N18.30    Severe obesity (BMI 35.0-39.9) with comorbidity ([facility]) E66.01    Stenosis of lateral recess of lumbosacral spine M48.07    Type 2 diabetes mellitus with chronic kidney disease E11.22    Sacroiliac joint pain M53.3    Type 2 
SPEECH LANGUAGE PATHOLOGY  Speech Language Pathology  [facility] ACUTE REHAB    Cognitive Treatment Note    Date: 6/19/2024  Patient’s Name: Kavya De Santiago  MRN: [id number]  Diagnosis:   Patient Active Problem List   Diagnosis Code    Cervical spondylosis M47.812    Type 2 diabetes mellitus without complication, without long-term current use of insulin ([facility]) E11.9    Hypertension I10    Abnormal auditory perception H93.299    Nasal polyps J33.9    Osteoarthritis of left knee M17.12    Osteoarthritis of right knee M17.11    DIEGO (nonalcoholic steatohepatitis) K75.81    Vitamin D deficiency E55.9    Iron deficiency anemia D50.9    Dyslipidemia E78.5    Trochanteric bursitis M70.60    Chronic left shoulder pain M25.512, G89.29    Restless legs syndrome G25.81    Generalized anxiety disorder F41.1    Acute encephalopathy G93.40    Class 2 obesity in adult E66.9    Leg edema R60.0    Recurrent major depressive disorder ([facility]) F33.9    Dermatitis L30.9    Retrolisthesis of vertebrae M43.10    Bilateral carpal tunnel syndrome G56.03    Intention tremor G25.2    Sciatica M54.30    Back pain with radiation M54.9    Left sciatic nerve pain M54.32    Lumbar disc disease with radiculopathy M51.16    Intractable back pain M54.9    Piriformis syndrome of left side G57.02    Ulnar neuropathy at elbow, left G56.22    S/P decompression of ulnar nerve at elbow Z98.890    Cervical myelopathy with cervical radiculopathy ([facility]) G95.9, M54.12    Lumbar spondylosis M47.816    Right median nerve neuropathy G56.11    Sacroiliitis ([facility]) M46.1    Secondary diabetes mellitus with stage 2 chronic kidney disease (GFR 60-89) ([facility]) E13.22, N18.2    Benign hypertension with CKD (chronic kidney disease) stage III ([facility]) I12.9, N18.30    Severe obesity (BMI 35.0-39.9) with comorbidity ([facility]) E66.01    Stenosis of lateral recess of lumbosacral spine M48.07    Type 2 diabetes mellitus with chronic kidney disease E11.22    Sacroiliac joint pain M53.3    Type 2 
SPEECH LANGUAGE PATHOLOGY  Speech Language Pathology  [facility] ACUTE REHAB    Cognitive Treatment Note    Date: 6/18/2024  Patient’s Name: Kavya De Santiago  MRN: [id number]  Diagnosis:   Patient Active Problem List   Diagnosis Code    Cervical spondylosis M47.812    Type 2 diabetes mellitus without complication, without long-term current use of insulin ([facility]) E11.9    Hypertension I10    Abnormal auditory perception H93.299    Nasal polyps J33.9    Osteoarthritis of left knee M17.12    Osteoarthritis of right knee M17.11    DIEGO (nonalcoholic steatohepatitis) K75.81    Vitamin D deficiency E55.9    Iron deficiency anemia D50.9    Dyslipidemia E78.5    Trochanteric bursitis M70.60    Chronic left shoulder pain M25.512, G89.29    Restless legs syndrome G25.81    Generalized anxiety disorder F41.1    Acute encephalopathy G93.40    Class 2 obesity in adult E66.9    Leg edema R60.0    Recurrent major depressive disorder ([facility]) F33.9    Dermatitis L30.9    Retrolisthesis of vertebrae M43.10    Bilateral carpal tunnel syndrome G56.03    Intention tremor G25.2    Sciatica M54.30    Back pain with radiation M54.9    Left sciatic nerve pain M54.32    Lumbar disc disease with radiculopathy M51.16    Intractable back pain M54.9    Piriformis syndrome of left side G57.02    Ulnar neuropathy at elbow, left G56.22    S/P decompression of ulnar nerve at elbow Z98.890    Cervical myelopathy with cervical radiculopathy ([facility]) G95.9, M54.12    Lumbar spondylosis M47.816    Right median nerve neuropathy G56.11    Sacroiliitis ([facility]) M46.1    Secondary diabetes mellitus with stage 2 chronic kidney disease (GFR 60-89) ([facility]) E13.22, N18.2    Benign hypertension with CKD (chronic kidney disease) stage III ([facility]) I12.9, N18.30    Severe obesity (BMI 35.0-39.9) with comorbidity ([facility]) E66.01    Stenosis of lateral recess of lumbosacral spine M48.07    Type 2 diabetes mellitus with chronic kidney disease E11.22    Sacroiliac joint pain M53.3    Type 2 
SPEECH LANGUAGE PATHOLOGY  Speech Language Pathology  [facility] ACUTE REHAB    Cognitive Treatment Note    Date: 7/3/2024  Patient’s Name: Kavya De Santiago  MRN: [id number]  Diagnosis:   Patient Active Problem List   Diagnosis Code    Cervical spondylosis M47.812    Type 2 diabetes mellitus without complication, without long-term current use of insulin ([facility]) E11.9    Hypertension I10    Abnormal auditory perception H93.299    Nasal polyps J33.9    Osteoarthritis of left knee M17.12    Osteoarthritis of right knee M17.11    DIEGO (nonalcoholic steatohepatitis) K75.81    Vitamin D deficiency E55.9    Iron deficiency anemia D50.9    Dyslipidemia E78.5    Trochanteric bursitis M70.60    Chronic left shoulder pain M25.512, G89.29    Restless legs syndrome G25.81    Generalized anxiety disorder F41.1    Acute encephalopathy G93.40    Class 2 obesity in adult E66.9    Leg edema R60.0    Recurrent major depressive disorder ([facility]) F33.9    Dermatitis L30.9    Retrolisthesis of vertebrae M43.10    Bilateral carpal tunnel syndrome G56.03    Intention tremor G25.2    Sciatica M54.30    Back pain with radiation M54.9    Left sciatic nerve pain M54.32    Lumbar disc disease with radiculopathy M51.16    Intractable back pain M54.9    Piriformis syndrome of left side G57.02    Ulnar neuropathy at elbow, left G56.22    S/P decompression of ulnar nerve at elbow Z98.890    Cervical myelopathy with cervical radiculopathy ([facility]) G95.9, M54.12    Lumbar spondylosis M47.816    Right median nerve neuropathy G56.11    Sacroiliitis ([facility]) M46.1    Secondary diabetes mellitus with stage 2 chronic kidney disease (GFR 60-89) ([facility]) E13.22, N18.2    Benign hypertension with CKD (chronic kidney disease) stage III ([facility]) I12.9, N18.30    Severe obesity (BMI 35.0-39.9) with comorbidity ([facility]) E66.01    Stenosis of lateral recess of lumbosacral spine M48.07    Type 2 diabetes mellitus with chronic kidney disease E11.22    Sacroiliac joint pain M53.3    Type 2 
SPEECH LANGUAGE PATHOLOGY  Speech Language Pathology  [facility] ACUTE REHAB    Cognitive Treatment Note    Date: 6/21/2024  Patient’s Name: Kavya De Santiago  MRN: [id number]  Diagnosis:   Patient Active Problem List   Diagnosis Code    Cervical spondylosis M47.812    Type 2 diabetes mellitus without complication, without long-term current use of insulin ([facility]) E11.9    Hypertension I10    Abnormal auditory perception H93.299    Nasal polyps J33.9    Osteoarthritis of left knee M17.12    Osteoarthritis of right knee M17.11    DIEGO (nonalcoholic steatohepatitis) K75.81    Vitamin D deficiency E55.9    Iron deficiency anemia D50.9    Dyslipidemia E78.5    Trochanteric bursitis M70.60    Chronic left shoulder pain M25.512, G89.29    Restless legs syndrome G25.81    Generalized anxiety disorder F41.1    Acute encephalopathy G93.40    Class 2 obesity in adult E66.9    Leg edema R60.0    Recurrent major depressive disorder ([facility]) F33.9    Dermatitis L30.9    Retrolisthesis of vertebrae M43.10    Bilateral carpal tunnel syndrome G56.03    Intention tremor G25.2    Sciatica M54.30    Back pain with radiation M54.9    Left sciatic nerve pain M54.32    Lumbar disc disease with radiculopathy M51.16    Intractable back pain M54.9    Piriformis syndrome of left side G57.02    Ulnar neuropathy at elbow, left G56.22    S/P decompression of ulnar nerve at elbow Z98.890    Cervical myelopathy with cervical radiculopathy ([facility]) G95.9, M54.12    Lumbar spondylosis M47.816    Right median nerve neuropathy G56.11    Sacroiliitis ([facility]) M46.1    Secondary diabetes mellitus with stage 2 chronic kidney disease (GFR 60-89) ([facility]) E13.22, N18.2    Benign hypertension with CKD (chronic kidney disease) stage III ([facility]) I12.9, N18.30    Severe obesity (BMI 35.0-39.9) with comorbidity ([facility]) E66.01    Stenosis of lateral recess of lumbosacral spine M48.07    Type 2 diabetes mellitus with chronic kidney disease E11.22    Sacroiliac joint pain M53.3    Type 2 
SPEECH LANGUAGE PATHOLOGY  Speech Language Pathology  [facility] ACUTE REHAB    Cognitive Treatment Note    Date: 7/2/2024  Patient’s Name: Kavya De Santiago  MRN: [id number]  Diagnosis:   Patient Active Problem List   Diagnosis Code    Cervical spondylosis M47.812    Type 2 diabetes mellitus without complication, without long-term current use of insulin ([facility]) E11.9    Hypertension I10    Abnormal auditory perception H93.299    Nasal polyps J33.9    Osteoarthritis of left knee M17.12    Osteoarthritis of right knee M17.11    DIEGO (nonalcoholic steatohepatitis) K75.81    Vitamin D deficiency E55.9    Iron deficiency anemia D50.9    Dyslipidemia E78.5    Trochanteric bursitis M70.60    Chronic left shoulder pain M25.512, G89.29    Restless legs syndrome G25.81    Generalized anxiety disorder F41.1    Acute encephalopathy G93.40    Class 2 obesity in adult E66.9    Leg edema R60.0    Recurrent major depressive disorder ([facility]) F33.9    Dermatitis L30.9    Retrolisthesis of vertebrae M43.10    Bilateral carpal tunnel syndrome G56.03    Intention tremor G25.2    Sciatica M54.30    Back pain with radiation M54.9    Left sciatic nerve pain M54.32    Lumbar disc disease with radiculopathy M51.16    Intractable back pain M54.9    Piriformis syndrome of left side G57.02    Ulnar neuropathy at elbow, left G56.22    S/P decompression of ulnar nerve at elbow Z98.890    Cervical myelopathy with cervical radiculopathy ([facility]) G95.9, M54.12    Lumbar spondylosis M47.816    Right median nerve neuropathy G56.11    Sacroiliitis ([facility]) M46.1    Secondary diabetes mellitus with stage 2 chronic kidney disease (GFR 60-89) ([facility]) E13.22, N18.2    Benign hypertension with CKD (chronic kidney disease) stage III ([facility]) I12.9, N18.30    Severe obesity (BMI 35.0-39.9) with comorbidity ([facility]) E66.01    Stenosis of lateral recess of lumbosacral spine M48.07    Type 2 diabetes mellitus with chronic kidney disease E11.22    Sacroiliac joint pain M53.3    Type 2 
SPEECH LANGUAGE PATHOLOGY  Speech Language Pathology  [facility] ACUTE REHAB    Cognitive Treatment Note    Date: 6/25/2024  Patient’s Name: Kavya De Santiago  MRN: [id number]  Diagnosis:   Patient Active Problem List   Diagnosis Code    Cervical spondylosis M47.812    Type 2 diabetes mellitus without complication, without long-term current use of insulin ([facility]) E11.9    Hypertension I10    Abnormal auditory perception H93.299    Nasal polyps J33.9    Osteoarthritis of left knee M17.12    Osteoarthritis of right knee M17.11    DIEGO (nonalcoholic steatohepatitis) K75.81    Vitamin D deficiency E55.9    Iron deficiency anemia D50.9    Dyslipidemia E78.5    Trochanteric bursitis M70.60    Chronic left shoulder pain M25.512, G89.29    Restless legs syndrome G25.81    Generalized anxiety disorder F41.1    Acute encephalopathy G93.40    Class 2 obesity in adult E66.9    Leg edema R60.0    Recurrent major depressive disorder ([facility]) F33.9    Dermatitis L30.9    Retrolisthesis of vertebrae M43.10    Bilateral carpal tunnel syndrome G56.03    Intention tremor G25.2    Sciatica M54.30    Back pain with radiation M54.9    Left sciatic nerve pain M54.32    Lumbar disc disease with radiculopathy M51.16    Intractable back pain M54.9    Piriformis syndrome of left side G57.02    Ulnar neuropathy at elbow, left G56.22    S/P decompression of ulnar nerve at elbow Z98.890    Cervical myelopathy with cervical radiculopathy ([facility]) G95.9, M54.12    Lumbar spondylosis M47.816    Right median nerve neuropathy G56.11    Sacroiliitis ([facility]) M46.1    Secondary diabetes mellitus with stage 2 chronic kidney disease (GFR 60-89) ([facility]) E13.22, N18.2    Benign hypertension with CKD (chronic kidney disease) stage III ([facility]) I12.9, N18.30    Severe obesity (BMI 35.0-39.9) with comorbidity ([facility]) E66.01    Stenosis of lateral recess of lumbosacral spine M48.07    Type 2 diabetes mellitus with chronic kidney disease E11.22    Sacroiliac joint pain M53.3    Type 2 
SPEECH LANGUAGE PATHOLOGY  Speech Language Pathology  [facility] ACUTE REHAB    Cognitive Treatment Note    Date: 6/24/2024  Patient’s Name: Kavya De Santiago  MRN: [id number]  Diagnosis:   Patient Active Problem List   Diagnosis Code    Cervical spondylosis M47.812    Type 2 diabetes mellitus without complication, without long-term current use of insulin ([facility]) E11.9    Hypertension I10    Abnormal auditory perception H93.299    Nasal polyps J33.9    Osteoarthritis of left knee M17.12    Osteoarthritis of right knee M17.11    DIEGO (nonalcoholic steatohepatitis) K75.81    Vitamin D deficiency E55.9    Iron deficiency anemia D50.9    Dyslipidemia E78.5    Trochanteric bursitis M70.60    Chronic left shoulder pain M25.512, G89.29    Restless legs syndrome G25.81    Generalized anxiety disorder F41.1    Acute encephalopathy G93.40    Class 2 obesity in adult E66.9    Leg edema R60.0    Recurrent major depressive disorder ([facility]) F33.9    Dermatitis L30.9    Retrolisthesis of vertebrae M43.10    Bilateral carpal tunnel syndrome G56.03    Intention tremor G25.2    Sciatica M54.30    Back pain with radiation M54.9    Left sciatic nerve pain M54.32    Lumbar disc disease with radiculopathy M51.16    Intractable back pain M54.9    Piriformis syndrome of left side G57.02    Ulnar neuropathy at elbow, left G56.22    S/P decompression of ulnar nerve at elbow Z98.890    Cervical myelopathy with cervical radiculopathy ([facility]) G95.9, M54.12    Lumbar spondylosis M47.816    Right median nerve neuropathy G56.11    Sacroiliitis ([facility]) M46.1    Secondary diabetes mellitus with stage 2 chronic kidney disease (GFR 60-89) ([facility]) E13.22, N18.2    Benign hypertension with CKD (chronic kidney disease) stage III ([facility]) I12.9, N18.30    Severe obesity (BMI 35.0-39.9) with comorbidity ([facility]) E66.01    Stenosis of lateral recess of lumbosacral spine M48.07    Type 2 diabetes mellitus with chronic kidney disease E11.22    Sacroiliac joint pain M53.3    Type 2 
SPEECH LANGUAGE PATHOLOGY  Speech Language Pathology  [facility] ACUTE REHAB    Cognitive Treatment Note    Date: 6/27/2024  Patient’s Name: Kavya De Santiago  MRN: [id number]  Diagnosis:   Patient Active Problem List   Diagnosis Code    Cervical spondylosis M47.812    Type 2 diabetes mellitus without complication, without long-term current use of insulin ([facility]) E11.9    Hypertension I10    Abnormal auditory perception H93.299    Nasal polyps J33.9    Osteoarthritis of left knee M17.12    Osteoarthritis of right knee M17.11    DIEGO (nonalcoholic steatohepatitis) K75.81    Vitamin D deficiency E55.9    Iron deficiency anemia D50.9    Dyslipidemia E78.5    Trochanteric bursitis M70.60    Chronic left shoulder pain M25.512, G89.29    Restless legs syndrome G25.81    Generalized anxiety disorder F41.1    Acute encephalopathy G93.40    Class 2 obesity in adult E66.9    Leg edema R60.0    Recurrent major depressive disorder ([facility]) F33.9    Dermatitis L30.9    Retrolisthesis of vertebrae M43.10    Bilateral carpal tunnel syndrome G56.03    Intention tremor G25.2    Sciatica M54.30    Back pain with radiation M54.9    Left sciatic nerve pain M54.32    Lumbar disc disease with radiculopathy M51.16    Intractable back pain M54.9    Piriformis syndrome of left side G57.02    Ulnar neuropathy at elbow, left G56.22    S/P decompression of ulnar nerve at elbow Z98.890    Cervical myelopathy with cervical radiculopathy ([facility]) G95.9, M54.12    Lumbar spondylosis M47.816    Right median nerve neuropathy G56.11    Sacroiliitis ([facility]) M46.1    Secondary diabetes mellitus with stage 2 chronic kidney disease (GFR 60-89) ([facility]) E13.22, N18.2    Benign hypertension with CKD (chronic kidney disease) stage III ([facility]) I12.9, N18.30    Severe obesity (BMI 35.0-39.9) with comorbidity ([facility]) E66.01    Stenosis of lateral recess of lumbosacral spine M48.07    Type 2 diabetes mellitus with chronic kidney disease E11.22    Sacroiliac joint pain M53.3    Type 2 
SPEECH LANGUAGE PATHOLOGY  Speech Language Pathology  [facility] ACUTE REHAB    Cognitive Treatment Note    Date: 6/26/2024  Patient’s Name: Kavya De Santiago  MRN: [id number]  Diagnosis:   Patient Active Problem List   Diagnosis Code    Cervical spondylosis M47.812    Type 2 diabetes mellitus without complication, without long-term current use of insulin ([facility]) E11.9    Hypertension I10    Abnormal auditory perception H93.299    Nasal polyps J33.9    Osteoarthritis of left knee M17.12    Osteoarthritis of right knee M17.11    DIEGO (nonalcoholic steatohepatitis) K75.81    Vitamin D deficiency E55.9    Iron deficiency anemia D50.9    Dyslipidemia E78.5    Trochanteric bursitis M70.60    Chronic left shoulder pain M25.512, G89.29    Restless legs syndrome G25.81    Generalized anxiety disorder F41.1    Acute encephalopathy G93.40    Class 2 obesity in adult E66.9    Leg edema R60.0    Recurrent major depressive disorder ([facility]) F33.9    Dermatitis L30.9    Retrolisthesis of vertebrae M43.10    Bilateral carpal tunnel syndrome G56.03    Intention tremor G25.2    Sciatica M54.30    Back pain with radiation M54.9    Left sciatic nerve pain M54.32    Lumbar disc disease with radiculopathy M51.16    Intractable back pain M54.9    Piriformis syndrome of left side G57.02    Ulnar neuropathy at elbow, left G56.22    S/P decompression of ulnar nerve at elbow Z98.890    Cervical myelopathy with cervical radiculopathy ([facility]) G95.9, M54.12    Lumbar spondylosis M47.816    Right median nerve neuropathy G56.11    Sacroiliitis ([facility]) M46.1    Secondary diabetes mellitus with stage 2 chronic kidney disease (GFR 60-89) ([facility]) E13.22, N18.2    Benign hypertension with CKD (chronic kidney disease) stage III ([facility]) I12.9, N18.30    Severe obesity (BMI 35.0-39.9) with comorbidity ([facility]) E66.01    Stenosis of lateral recess of lumbosacral spine M48.07    Type 2 diabetes mellitus with chronic kidney disease E11.22    Sacroiliac joint pain M53.3    Type 2 
SPEECH LANGUAGE PATHOLOGY  Speech Language Pathology  [facility] ACUTE REHAB    Cognitive Treatment Note    Date: 6/22/2024  Patient’s Name: Kavya De Santiago  MRN: [id number]  Diagnosis:   Patient Active Problem List   Diagnosis Code    Cervical spondylosis M47.812    Type 2 diabetes mellitus without complication, without long-term current use of insulin ([facility]) E11.9    Hypertension I10    Abnormal auditory perception H93.299    Nasal polyps J33.9    Osteoarthritis of left knee M17.12    Osteoarthritis of right knee M17.11    DIEGO (nonalcoholic steatohepatitis) K75.81    Vitamin D deficiency E55.9    Iron deficiency anemia D50.9    Dyslipidemia E78.5    Trochanteric bursitis M70.60    Chronic left shoulder pain M25.512, G89.29    Restless legs syndrome G25.81    Generalized anxiety disorder F41.1    Acute encephalopathy G93.40    Class 2 obesity in adult E66.9    Leg edema R60.0    Recurrent major depressive disorder ([facility]) F33.9    Dermatitis L30.9    Retrolisthesis of vertebrae M43.10    Bilateral carpal tunnel syndrome G56.03    Intention tremor G25.2    Sciatica M54.30    Back pain with radiation M54.9    Left sciatic nerve pain M54.32    Lumbar disc disease with radiculopathy M51.16    Intractable back pain M54.9    Piriformis syndrome of left side G57.02    Ulnar neuropathy at elbow, left G56.22    S/P decompression of ulnar nerve at elbow Z98.890    Cervical myelopathy with cervical radiculopathy ([facility]) G95.9, M54.12    Lumbar spondylosis M47.816    Right median nerve neuropathy G56.11    Sacroiliitis ([facility]) M46.1    Secondary diabetes mellitus with stage 2 chronic kidney disease (GFR 60-89) ([facility]) E13.22, N18.2    Benign hypertension with CKD (chronic kidney disease) stage III ([facility]) I12.9, N18.30    Severe obesity (BMI 35.0-39.9) with comorbidity ([facility]) E66.01    Stenosis of lateral recess of lumbosacral spine M48.07    Type 2 diabetes mellitus with chronic kidney disease E11.22    Sacroiliac joint pain M53.3    Type 2 
[facility]   Acute Rehabilitation Occupational Therapy Daily Treatment Note    Date: 24  Patient Name: Kavya De Santiago       Room: 2608/2608-01  MRN: [id number]  Account: [id number]   : 1960  (64 y.o.) Gender: female       Referring Practitioner: Jose Moran MD  Diagnosis: Subacute infarct left frontal lobe, encephalopathy/meningitis  Additional Pertinent Hx: Pt with history of type 2 diabetes, hypertension hyperlipidemia, CKD, GERD, DIEGO, obesity admitted with change in mental status, nausea vomiting diarrhea.  She required intubation reportedly patient had some upper respiratory symptoms over the past week with dry cough decreased appetite rhinorrhea congestion headache and sinus pain.  She then began to have some diarrhea and vomiting..  EMS noted GCS of 89 decreased O2 sat.  She was intubated CT chest showed no PE CT abdomen pelvis showed pancolitis with abnormal wall thickening of the ascending transverse descending colon sigmoid colon and rectum she was started antibiotic she had elevated glucose influenza was negative. Critical care -acute hypoxic respite failure intubated  extubated , sepsis with H influenza bacteremia and meningitis question colitis has rectal tube, chronic back pain-sees neurology, neurosurgery and pain management Dr. Granados, obstruct sleep apnea no sleep study done never followed up in office nonmorbid obesity COVID-negative. Pt admitted to ARU 24.    Treatment Diagnosis: Impaired self care status    Past Medical History:  has a past medical history of Cervical spondylosis, CKD (chronic kidney disease), COVID-19, Fall, Generalized anxiety disorder, GERD (gastroesophageal reflux disease), History of recent hospitalization, History of swelling of feet, Hyperlipidemia, Hypertension, Left hand weakness, Lumbar radiculopathy, DIEGO (nonalcoholic steatohepatitis), Obesity, Osteoarthritis of left knee, Recurrent major depressive disorder 
[facility]   Acute Rehabilitation Occupational Therapy Daily Treatment Note    Date: 24  Patient Name: Kavya De Santiago       Room: 2608/2608-01  MRN: [id number]  Account: [id number]   : 1960  (64 y.o.) Gender: female       Referring Practitioner: Jose Moran MD  Diagnosis: Subacute infarct left frontal lobe, encephalopathy/meningitis  Additional Pertinent Hx: Pt with history of type 2 diabetes, hypertension hyperlipidemia, CKD, GERD, DIEGO, obesity admitted with change in mental status, nausea vomiting diarrhea.  She required intubation reportedly patient had some upper respiratory symptoms over the past week with dry cough decreased appetite rhinorrhea congestion headache and sinus pain.  She then began to have some diarrhea and vomiting..  EMS noted GCS of 89 decreased O2 sat.  She was intubated CT chest showed no PE CT abdomen pelvis showed pancolitis with abnormal wall thickening of the ascending transverse descending colon sigmoid colon and rectum she was started antibiotic she had elevated glucose influenza was negative. Critical care -acute hypoxic respite failure intubated  extubated , sepsis with H influenza bacteremia and meningitis question colitis has rectal tube, chronic back pain-sees neurology, neurosurgery and pain management Dr. Granados, obstruct sleep apnea no sleep study done never followed up in office nonmorbid obesity COVID-negative. Pt admitted to ARU 24.    Treatment Diagnosis: Impaired self care status    Past Medical History:  has a past medical history of Cervical spondylosis, CKD (chronic kidney disease), COVID-19, Fall, Generalized anxiety disorder, GERD (gastroesophageal reflux disease), History of recent hospitalization, History of swelling of feet, Hyperlipidemia, Hypertension, Left hand weakness, Lumbar radiculopathy, DIEGO (nonalcoholic steatohepatitis), Obesity, Osteoarthritis of left knee, Recurrent major depressive disorder 
Unable to get blood return in [facility]. PMR notified.  
[facility]   OCCUPATIONAL THERAPY MISSED TREATMENT NOTE   ACUTE REHAB  Date: 24  Patient Name: Kavya De Santiago       Room: 2608/2608-01  MRN: [id number]   Account #: [id number]    : 1960  (64 y.o.)  Gender: female   Referring Practitioner: Jose Moran MD  Diagnosis: Subacute infarct left frontal lobe, encephalopathy/meningitis             REASON FOR MISSED TREATMENT:  Patient declined   -   Other - Pt in bed am and unable to tolerate therapy d/t headache and light sensitivity unable to keep eyes open. patient has towel over her eyes. Afternoon, declining due to fatigue and not feeling well.             24 1458   Minute Variance   Variance 90   Reason Illness  (Pt reporting intense headache and declining therapy this morning. PM: pt declining therapy reporting she still doesn't feel well and is tired.)         Electronically signed by LUCILLE oMscoso on 24 at 3:01 PM EDT    
   SHOULDER MANIPULATION WITH PRE OP INTERSCALENE BLOCK and intraop injection performed by Ahsan Brown DO at [facility] OR    SPINE SURGERY Left 10/03/2022    S1 nerve root injection, Dr. Alston    SPINE SURGERY Left 10/06/2022    S1 NERVE ROOT INJECTION performed by Rema Alston MD at [facility] OR    TONSILLECTOMY      as child    UPPER GASTROINTESTINAL ENDOSCOPY  07/23/2012    [facility]       Chart Reviewed: Yes  Patient assessed for rehabilitation services?: Yes  Additional Pertinent Hx: Pt with history of type 2 diabetes, hypertension hyperlipidemia, CKD, GERD, DIEGO, obesity admitted with change in mental status, nausea vomiting diarrhea.  She required intubation reportedly patient had some upper respiratory symptoms over the past week with dry cough decreased appetite rhinorrhea congestion headache and sinus pain.  She then began to have some diarrhea and vomiting..  EMS noted GCS of 89 decreased O2 sat.  She was intubated CT chest showed no PE CT abdomen pelvis showed pancolitis with abnormal wall thickening of the ascending transverse descending colon sigmoid colon and rectum she was started antibiotic she had elevated glucose influenza was negative  Referring Practitioner: Jose Moran MD  Referral Date : 06/14/24  Diagnosis: Subacute infarct left frontal lobe, encephalopathy/meningitis  Other (Comment): Pt is R hand Dominant    Restrictions:  Restrictions/Precautions: Fall Risk;General Precautions     SUBJECTIVE  Subjective: patient without complaints and states better today less fatigue            OBJECTIVE               Functional Mobility  Bed mobility  Rolling to Right: Modified independent  Supine to Sit: Modified independent  Sit to Supine: Modified independent  Scooting: Modified independent  Transfers  Sit to Stand: Modified independent  Stand to Sit: Modified independent  Bed to Chair: Modified independent  Stand Pivot Transfers: Modified independent    Environmental 
  CT head without contrast 6/18/2024  No acute intracranial abnormality  Cerebral and cerebellar parenchymal volume loss with chronic microvascular white matter ischemic disease  Bilateral mastoid effusions with fluid in the middle ear correlate for signs of acute infection     Lexiscan Myoview stress test 6/11/2024  No ischemia no 3 times daily ejection fraction 69%     ECG 6/5/2024  Sinus rhythm heart rate 60, low voltage QRS, cannot rule out anterior infarct, left axis, artifact  ECG 6/10/2024  Sinus rhythm heart rate 60, low voltage, poor R wave progression, T wave inversion lead III and aVF, no significant change from previous ECG     2D echo 5/28/2024  Normal LV size mild LVH normal wall motion ejection fraction 65 to 70%  Mild tricuspid regurgitation right ventricle systolic pressure 52 mmHg     CTA head without contrast 6/6/2024  No acute intracranial process identified  Fluid opacification of the middle ears and mastoid air cells  Blood level within the frontal sinuses suggesting acute frontal sinusitis     CTA chest 5/26/2024  No evidence of pulmonary embolism  Prominent main pulmonary artery which could indicate underlying pulmonary arterial hypertension  Hepatic asteatosis    History of present illness  64-year-old  female with a past medical history of hypertension, overweight, diabetes mellitus, chronic pain got hospitalized on 5/26/2024 with altered mental status associated with hypoxic respiratory failure, nausea vomiting and diarrhea.  She got intubated in the ER on arrival.  Prior to admission she developed upper respiratory symptoms and congestion and headache about 1 week before admission.  On the day of admission she was found on the ground, EMS was called.  Patient was initially hypothermic and then she developed high-grade fever maximum of 105 she became hypotensive required pressure agent.  CT head showed no acute abnormality, CTA chest showed no evidence of pulmonary embolism.  CT 
(poor appetite) as evidenced by intake 0-25%, intake 26-50% (previously)    Nutrition Interventions:   Food and/or Nutrient Delivery: Continue Current Diet, Continue Oral Nutrition Supplement  Nutrition Education/Counseling:  (Will offer additional education)  Coordination of Nutrition Care: Continue to monitor while inpatient       Goals:  Previous Goal Met: Progressing toward Goal(s)  Goals: PO intake 75% or greater       Nutrition Monitoring and Evaluation:      Food/Nutrient Intake Outcomes: Food and Nutrient Intake, Supplement Intake  Physical Signs/Symptoms Outcomes: Biochemical Data, GI Status, Fluid Status or Edema, Skin, Weight    Discharge Planning:    Continue current diet     Leanne Jerome, TABATHA, LD  Contact: [phone]      
02/17/2021    SHOULDER MANIPULATION WITH PRE OP INTERSCALENE BLOCK and intraop injection performed by Ahsan Brown DO at [facility] OR    SPINE SURGERY Left 10/03/2022    S1 nerve root injection, Dr. Alston    SPINE SURGERY Left 10/06/2022    S1 NERVE ROOT INJECTION performed by Rema Alston MD at [facility] OR    TONSILLECTOMY      as child    UPPER GASTROINTESTINAL ENDOSCOPY  07/23/2012    [facility]       Chart Reviewed: Yes  Patient assessed for rehabilitation services?: Yes  Additional Pertinent Hx: Pt with history of type 2 diabetes, hypertension hyperlipidemia, CKD, GERD, DIEGO, obesity admitted with change in mental status, nausea vomiting diarrhea.  She required intubation reportedly patient had some upper respiratory symptoms over the past week with dry cough decreased appetite rhinorrhea congestion headache and sinus pain.  She then began to have some diarrhea and vomiting..  EMS noted GCS of 89 decreased O2 sat.  She was intubated CT chest showed no PE CT abdomen pelvis showed pancolitis with abnormal wall thickening of the ascending transverse descending colon sigmoid colon and rectum she was started antibiotic she had elevated glucose influenza was negative  Family / Caregiver Present: Yes (spouse pm with car transfer , outside gait and 32\" bed height.)  Referring Practitioner: Jose Moran MD  Referral Date : 06/14/24  Diagnosis: Subacute infarct left frontal lobe, encephalopathy/meningitis  Other (Comment): Pt is R hand Dominant    Restrictions:  Restrictions/Precautions: Fall Risk;General Precautions     SUBJECTIVE  Subjective: Pt seated in reclining chair, pleasantly agreeable to tx with  Rj.  Pain: Pt denies having pain      Prior Level of Function:  Social/Functional History  Lives With: Spouse, Daughter (2 adult daughters)  Type of Home: Apartment (Upper apartment above a store)  Home Layout: One level  Home Access: Stairs to enter with rails  Entrance Stairs - Number of Steps: 
Requirements: Admission  Energy (kcal/day): [place] x 1.2-1.3= 9915-6473 kcal  Weight Used for Protein Requirements: Admission  Protein (g/day): 1.1g/kg= 95-100g  Method Used for Fluid Requirements: 1 ml/kcal    Nutrition Diagnosis:   Inadequate protein intake related to  (poor appetite) as evidenced by intake 0-25%, intake 26-50%    Nutrition Interventions:   Food and/or Nutrient Delivery: Continue Current Diet, Start Oral Nutrition Supplement  Nutrition Education/Counseling: No recommendation at this time  Coordination of Nutrition Care: Continue to monitor while inpatient       Goals:     Goals: PO intake 75% or greater       Nutrition Monitoring and Evaluation:      Food/Nutrient Intake Outcomes: Food and Nutrient Intake, Supplement Intake  Physical Signs/Symptoms Outcomes: Biochemical Data, GI Status, Fluid Status or Edema, Skin, Weight    Discharge Planning:    Continue current diet     Trinity Palma RD, LD  Contact: [phone]    
one step commands with repetition  Attention Span: Difficulty attending to directions  Memory: Decreased short term memory;Decreased recall of recent events  Safety Judgement: Impaired;Decreased awareness of need for assistance;Decreased awareness of need for safety  Problem Solving: Impaired;Assistance required to generate solutions;Assistance required to implement solutions;Assistance required to identify errors made;Assistance required to correct errors made;Decreased awareness of errors  Insights: Decreased awareness of deficits  Initiation: Requires cues for all  Sequencing: Requires cues for all  Cognition Comment: Hard of Hearing from the Meningitis, impulsive tendencies without warning which could be in part due to hearing challenges.         Sensation  Overall Sensation Status: WFL (pt denies)    Functional Mobility  Bed mobility  Bed Mobility Comments: in WC AM, supine PM  Transfers  Sit to Stand: Stand by assistance  Stand to Sit: Stand by assistance  Stand Pivot Transfers: Contact guard assistance (RW and SC, VCs safe RW managment with education of benefits)  Squat Pivot Transfers: Contact guard assistance (no AD , pt seeks UE support, VCs for placement)  Balance  Posture: Fair  Sitting - Static: Fair;+  Sitting - Dynamic: Fair;+  Standing - Static: Fair;- (SC)  Standing - Dynamic: Fair;- (SC)    Environmental Mobility  Stairs/Curb  Stairs?: No    PT Exercises  PROM Exercises: seated autumn knee ext and gastroc  Resistive Exercises: seated autumn 1.5# lt green x 15. PM supine x 15 lt green, except ankle DF orange (VCs increased ROM)  Functional Mobility Circuit Training: Assist from cammode to sink with RW, SBA VCs for safe RW managment with education of benefits  Static Standing Balance Exercises: Stand at sink side no UE support ~ 2 minutes (SBA)  Disease-specific Exercises: AROM cervical rotation right <> left , cervical retractions (no complaints of pain)    ASSESSMENT  Vitals  Pulse: 89  SpO2: 95 %  BP: 
  Objective/Assessment:  Attention: Sustained throughout.       Recall: n/a     Organization: n/a     Problem Solving/Reasoning:   Pt. Completed deductive reasoning puzzles x1 c min A and planning projects puzzle c min A.        Other: No family present. Pt in recliner with call light in reach/personal alarm attached at end of session.   Pt. Continues to benefit from use of Pocket Talker while in ARU d/t hearing difficulties.        Objective:  /69   Pulse 70   Temp 98.5 °F (36.9 °C) (Oral)   Resp 16   Ht 1.575 m (5' 2\")   Wt 88.1 kg (194 lb 4.8 oz)   SpO2 96%   BMI 35.54 kg/m²       GEN: well developed, well nourished, NAD  HEENT: NCAT, PERRL, EOMI, mucous membranes pink and moist  CV: RRR, no murmurs, rubs or gallops  PULM: CTAB, no rales or rhonchi. Respirations WNL and unlabored  ABD: soft, NT, ND, BS+ and equal  NEURO: A&O x3. Sensation intact to light touch.   MSK: Functional ROM all extremities .  Strength 4+/5 key muscles RUE and RLE. 4-/5 key muscles LUE, 4/5 key muscles LLE.   EXTREMITIES: No calf tenderness to palpation bilaterally. No edema BLEs  SKIN: warm dry and intact with good turgor  PSYCH: appropriately interactive. Affect WNL.     Diagnostics:     CBC:   Recent Labs     06/29/24  0659   WBC 7.0   RBC 3.28*   HGB 9.0*   HCT 28.8*   MCV 87.9   RDW 16.8*        BMP:   Recent Labs     06/29/24  0659      K 3.7      CO2 30   BUN 12   CREATININE 0.6   GLUCOSE 178*     BNP: No results for input(s): \"BNP\" in the last 72 hours.  PT/INR: No results for input(s): \"PROTIME\", \"INR\" in the last 72 hours.  APTT: No results for input(s): \"APTT\" in the last 72 hours.  CARDIAC ENZYMES: No results for input(s): \"CKMB\", \"CKMBINDEX\", \"TROPONINT\" in the last 72 hours.    Invalid input(s): \"CKTOTAL;3\" troponins   FASTING LIPID PANEL:  Lab Results   Component Value Date    CHOL 126 06/05/2024    HDL 27 (L) 06/05/2024    TRIG 262 (H) 06/05/2024     LIVER PROFILE:   Recent Labs     
Able to doff/rina [place] shirt while seated.  Lower Extremity Dressing  Assistance Level: Modified independent  Skilled Clinical Factors: Pt able to doff/rina underwear/pants wth good safety and no LOB noted.  Putting On/Taking Off Footwear  Equipment Provided: Reachers, Sock aid  Assistance Level: Modified independent  Skilled Clinical Factors: Pt able to doff/don footies and slip on shoes. Pt is walking community distances.  Toileting  Assistance Level: Modified independent  Skilled Clinical Factors: Clothing management and hygiene while standing completed safely. no LOB.  Toilet Transfers  Technique:  (ambulating with cane.)  Equipment: Standard toilet  Additional Factors: Increased time to complete  Assistance Level: Modified independent  Skilled Clinical Factors: Good safety and no LOB noted.      ST:    Cognitive Treatment     Treatment time: 3529-9964        Subjective: [x] Alert     [x] Cooperative     [] Confused     [] Agitated    [] Lethargic        Objective/Assessment:  Attention: Sustained throughout.       Recall: Pt. Recalled 3/3 units immediately and given delay.      Organization: Pt. Completed deductive by exclusion puzzle x2 c mod to max A.      Problem Solving/Reasoning:   Pt. Completed deductive reasoning puzzles x1 c very min A.        Other: No family present. Pt in recliner with call light in reach/personal alarm attached at end of session.   Pt. Continues to benefit from use of Pocket Talker while in ARU d/t hearing difficulties.      Plan:  [x] Continue ST services    [] Discharge from ST:      Objective:  /72   Pulse 70   Temp 98.2 °F (36.8 °C) (Oral)   Resp 16   Ht 1.575 m (5' 2\")   Wt 88.1 kg (194 lb 4.8 oz)   SpO2 96%   BMI 35.54 kg/m²  I Body mass index is 35.54 kg/m². I   Wt Readings from Last 1 Encounters:   24 88.1 kg (194 lb 4.8 oz)      Temp (24hrs), Av.2 °F (36.8 °C), Min:98.2 °F (36.8 °C), Max:98.2 °F (36.8 °C)         GEN: well developed, well nourished, no 
Solving: Impaired;Assistance required to generate solutions;Assistance required to implement solutions;Assistance required to identify errors made;Assistance required to correct errors made;Decreased awareness of errors  Insights: Decreased awareness of deficits  Initiation: Requires cues for all  Sequencing: Requires cues for all  Cognition Comment: Hard of Hearing from the Meningitis, impulsive tendencies without warning which could be in part due to hearing challenges.  Patient affect:: Flat    Activities of Daily Living     Upper Extremity Dressing  Assistance Level: Stand by assist  Skilled Clinical Factors: setup assist provided as pt is unable to tolerate much movement this date d/t increased pain and nausea with movement    Lower Extremity Dressing  Assistance Level: Stand by assist  Skilled Clinical Factors: pt able to thread BLE and manage over hips with SBA for safety when standing with RW to manage over hips. After donning clohting pt returned to bed d/t increased pain and nausea, pt refused the rest of her AM session.    OT Exercises  Motor Control/Coordination: HOGAN facilitated pt's engagement in completeing a seated BUE table-top FMC activity for improved strength and coordination for completion of self-care. Pt completed harley peg board switching hands MCC through.    Assessment  Assessment  Activity Tolerance: Patient limited by pain;Patient limited by endurance;Patient limited by fatigue    Safety Devices  Safety Devices in place: Yes  Type of devices: Nurse notified;Left in bed;Patient at risk for falls;Call light within reach;Bed alarm in place       Goals  Patient Goals   Patient goals : \"To get stronger\"  Short Term Goals  Time Frame for Short Term Goals: 5 - 7 days  Short Term Goal 1: Pt will complete BADLs with SBA, Good safety and use of AE/DME/Modified techniques as needed.  Short Term Goal 2: Pt will complete functional transfers/mobility during self care tasks/functional activity 
Sustained throughout.       Orientation: x4 (I).      Recall: Delayed image recall: 90% (I), 100% min cues.     Organization: recall by characteristic, f=3: 100% MI (occasional repetition).     Problem Solving/Reasoning: Multiple meanings for homographs (3+): 75% (I) improved to 100% with mod verbal cues.      Other: No family present. Pt in  with call light in reach/personal alarm attached at end of session.       Current Medications:   Current Facility-Administered Medications: busPIRone (BUSPAR) tablet 10 mg, 10 mg, Oral, TID  sodium chloride flush 0.9 % injection 10 mL, 10 mL, IntraVENous, PRN  glucose chewable tablet 16 g, 4 tablet, Oral, PRN  dextrose bolus 10% 125 mL, 125 mL, IntraVENous, PRN **OR** dextrose bolus 10% 250 mL, 250 mL, IntraVENous, PRN  glucagon injection 1 mg, 1 mg, SubCUTAneous, PRN  dextrose 10 % infusion, , IntraVENous, Continuous PRN  dexAMETHasone (DECADRON) injection 4 mg, 4 mg, IntraVENous, Q12H  divalproex (DEPAKOTE) DR tablet 250 mg, 250 mg, Oral, BID  butalbital-APAP-caffeine -40 MG per capsule 1 capsule, 1 capsule, Oral, Q4H PRN  insulin lispro (HUMALOG,ADMELOG) injection vial 0-8 Units, 0-8 Units, SubCUTAneous, TID WC  insulin lispro (HUMALOG,ADMELOG) injection vial 0-4 Units, 0-4 Units, SubCUTAneous, Nightly  magnesium oxide (MAG-OX) tablet 400 mg, 400 mg, Oral, BID  hydrALAZINE (APRESOLINE) tablet 50 mg, 50 mg, Oral, 3 times per day  NIFEdipine (ADALAT CC) extended release tablet 30 mg, 30 mg, Oral, Nightly  lisinopril (PRINIVIL;ZESTRIL) tablet 20 mg, 20 mg, Oral, Daily  0.9 % sodium chloride infusion, , IntraVENous, Continuous  pantoprazole (PROTONIX) tablet 40 mg, 40 mg, Oral, QAM AC  insulin glargine (LANTUS) injection vial 22 Units, 22 Units, SubCUTAneous, Daily  cefTRIAXone (ROCEPHIN) 2,000 mg in sodium chloride 0.9 % 50 mL IVPB (mini-bag), 2,000 mg, IntraVENous, Q24H  lactobacillus (CULTURELLE) capsule 1 capsule, 1 capsule, Oral, Daily with breakfast  sodium 
assistance  Stand to Sit: Stand by assistance  Stand Pivot Transfers: Stand by assistance    Environmental Mobility  Ambulation  Surface: Level tile  Device: Rolling Walker  Assistance: Stand by assistance  Quality of Gait: decreased left step length , right stance time, steady gait, improved endurance  Gait Deviations: Slow Kaelyn;Decreased step length;Decreased step height;Decreased head and trunk rotation  Distance: 137' in AM and 200' in PM  Stairs/Curb  Stairs?: Yes  Stairs  # Steps : 17 (Seated rest at top of staircase before descending back down.)  Stairs Height: 8\"  Rails: Left ascending  Device: Single pt cane ([employer])  Assistance: Stand by assistance  Comment: Pt completed step over step technique to ascend steps, Step to technique while ascending, Step to when descending. Chair placed at top of staircase. Pt sits after ascending steps for rest break before descending steps    PT Exercises  Resistive Exercises: seated autumn 2# lime x 15.  Functional Mobility Circuit Training: Practiced entering bed at ~28\" height using step stool similar to pts home set up. Performed on mat table.  Standing Open/Closed Kinetic Chain Exercises: Standing BLE exs at RW x 10 2 UE support. Marching+kick, 3 way hip, heel raises.  Exercise Equipment: Nustep L2 x 8 minutes    ASSESSMENT       Activity Tolerance  Activity Tolerance: Patient limited by fatigue;Patient limited by endurance  Activity Tolerance Comments: frequent rest breaks as needed    Assessment  Treatment Diagnosis: Impaired endurance and mobility  Therapy Prognosis: Good  Decision Making: Medium Complexity  Discharge Recommendations: Outpatient PT      GOALS  Patient Goals   Patient Goals : Pt does not verbally state  Short Term Goals  Time Frame for Short Term Goals: 7 days  Short Term Goal 1: Pt to demo All bed mobility on a flat bed without rails and CGA  Short Term Goal 2: Pt to demo safe functional transfers with device and CGA  Short Term Goal 3: Pt 
distress.   Abdominal:      General: There is no distension.      Palpations: Abdomen is soft.   Musculoskeletal:      Cervical back: Neck supple. No rigidity.      Right lower leg: Edema present.      Left lower leg: Edema present.   Skin:     Coloration: Skin is not jaundiced.      Findings: No erythema.         Past Medical History:     Past Medical History:   Diagnosis Date    Cervical spondylosis 2009    c-4 c-5, c-5 c-6, c-6 c-7    CKD (chronic kidney disease)     per pt, does not have nephrologist    COVID-19 2021    nasal congestion, fatique and myalgia x 3 weeks    Fall     PATIENT FELL 2 WEEKS AGO LANDED ON [address]    Generalized anxiety disorder 2020    GERD (gastroesophageal reflux disease)     History of recent hospitalization 2022    til 10/9/22 at [facility]    History of swelling of feet     lasix QOD for this    Hyperlipidemia     Hypertension     Left hand weakness     and pain, can not make fist d/t cubital tunnel issue    Lumbar radiculopathy 11/10/2021    was following with pain management and has had epidural injections    DIEGO (nonalcoholic steatohepatitis) 10/12/2014    Obesity     Osteoarthritis of left knee 2014    Recurrent major depressive disorder (HCC) 07/15/2022    Restless legs syndrome     Snores 2022    was seen by Dr. Asencio for likely sleep apnea while hospitalized , to follow up as OP for sleep study, has not been done    TIA (transient ischemic attack)     no residual symptoms    Type II or unspecified type diabetes mellitus without mention of complication, not stated as uncontrolled     Wears glasses     Wellness examination     PCP, Dr. Hamilton, last seen       Past Surgical  History:     Past Surgical History:   Procedure Laterality Date    CARPAL TUNNEL RELEASE Left 2022    CUBITAL TUNNEL DECOMPRESSION performed by Candice Harvey DO at [facility] OR     SECTION      x2    COLONOSCOPY  2012    ? polyps per pt    HYSTERECTOMY, 
impulsive tendencies without warning which could be in part due to hearing challenges.  Patient affect:: Flat    Mobility        Roll Left  Assistance Level: Stand by assist  Roll Right  Assistance Level: Stand by assist  Sit to Supine  Assistance Level: Stand by assist  Skilled Clinical Factors: using bed rail     Scooting  Assistance Level: Stand by assist  Skilled Clinical Factors: hips back into bed    Transfers  Surface: To bed;Wheelchair  Additional Factors: Set-up;Hand placement cues;Verbal cues;Increased time to complete  Device: Walker  Sit to Stand  Assistance Level: Contact guard assist  Skilled Clinical Factors: Cues for hand placement  Stand to Sit  Assistance Level: Contact guard assist  Skilled Clinical Factors: VC for controlled descent and hand placement                    Functional Mobility  Device: Rolling walker  Activity:  (in room)  Assistance Level: Contact guard assist  Skilled Clinical Factors: with RW, no LOB noted, cuing for tech/safety           Additional Activities  Additional Activities Comment: pt participated in seated tabletop task, 1.5# wrist weights on RUE and 1# weights on LUE initially for task engagement, RB's req due to fatigue but tolerated fairly, pt requesting to remove weights due to fatigue with pt completing remaining parts of task without weights, no assist req except some cuing throughout, max time req for completion       Assessment  Assessment  Activity Tolerance: Patient limited by fatigue;Patient limited by endurance  Discharge Recommendations: Patient would benefit from continued therapy after discharge;Continue to assess pending progress    Patient Education  Education  Education Given To: Patient;Family  Education Provided: Plan of Care;Precautions;Safety;Mobility Training;Transfer Training  Education Method: Verbal;Demonstration  Barriers to Learning: Hearing (using pocket talker)  Education Outcome: Verbalized understanding;Demonstrated 
normal. No respiratory distress.   Abdominal:      General: There is no distension.      Palpations: Abdomen is soft.   Musculoskeletal:      Cervical back: Neck supple. No rigidity.      Right lower leg: Edema present.      Left lower leg: Edema present.   Skin:     Coloration: Skin is not jaundiced.      Findings: No erythema.         Past Medical History:     Past Medical History:   Diagnosis Date    Cervical spondylosis 2009    c-4 c-5, c-5 c-6, c-6 c-7    CKD (chronic kidney disease)     per pt, does not have nephrologist    COVID-19 2021    nasal congestion, fatique and myalgia x 3 weeks    Fall     PATIENT FELL 2 WEEKS AGO LANDED ON [address]    Generalized anxiety disorder 2020    GERD (gastroesophageal reflux disease)     History of recent hospitalization 2022    til 10/9/22 at [facility]'s    History of swelling of feet     lasix QOD for this    Hyperlipidemia     Hypertension     Left hand weakness     and pain, can not make fist d/t cubital tunnel issue    Lumbar radiculopathy 11/10/2021    was following with pain management and has had epidural injections    DIEGO (nonalcoholic steatohepatitis) 10/12/2014    Obesity     Osteoarthritis of left knee 2014    Recurrent major depressive disorder (HCC) 07/15/2022    Restless legs syndrome     Snores 2022    was seen by Dr. Asencio for likely sleep apnea while hospitalized , to follow up as OP for sleep study, has not been done    TIA (transient ischemic attack)     no residual symptoms    Type II or unspecified type diabetes mellitus without mention of complication, not stated as uncontrolled     Wears glasses     Wellness examination     PCP, Dr. Hamilton, last seen       Past Surgical  History:     Past Surgical History:   Procedure Laterality Date    CARPAL TUNNEL RELEASE Left 2022    CUBITAL TUNNEL DECOMPRESSION performed by Candice Harvey, DO at [facility] OR     SECTION      x2    COLONOSCOPY  2012    ? polyps 
safety  Problem Solving: Impaired;Assistance required to generate solutions;Assistance required to implement solutions;Assistance required to identify errors made;Assistance required to correct errors made;Decreased awareness of errors  Insights: Decreased awareness of deficits  Initiation: Requires cues for all  Sequencing: Requires cues for all  Cognition Comment: Hard of Hearing from the Meningitis, impulsive tendencies without warning which could be in part due to hearing challenges.  Patient affect:: Flat    Activities of Daily Living     Grooming/Oral Hygiene  Assistance Level: Set-up  Skilled Clinical Factors: Pt completed oral hygiene seated sink side in w/c along with hair brushing/drying    Upper Extremity Bathing  Assistance Level: Stand by assist  Skilled Clinical Factors: pt gathered supplies from RW level and transported to TTB. SBA for safety as pt has impulsive tendacies this date    Lower Extremity Bathing  Assistance Level: Stand by assist  Skilled Clinical Factors: pt gathered supplies from RW level and transported to TTB. SBA for safety as pt has impulsive tendacies    Upper Extremity Dressing  Assistance Level: Stand by assist  Skilled Clinical Factors: pt gathered supplies from RW level and transported to TTB. SBA for safety as pt has impulsive tendacies    Lower Extremity Dressing  Assistance Level: Stand by assist  Skilled Clinical Factors: pt able to thread BLE and manage over hips with CGA for safety when standing with RW to manage over hips    Putting On/Taking Off Footwear  Assistance Level: Stand by assist  Skilled Clinical Factors: pt donned/ doffed shoes with SBA    Tub/Shower Transfers  Type: Shower  Transfer From: Rolling walker  Transfer To: Tub transfer bench  Additional Factors: With handrails  Assistance Level: Contact guard assist  Skilled Clinical Factors: CGA and vc for safety    Mobility     Sit to Stand  Assistance Level: Contact guard assist  Skilled Clinical Factors: Cues 
showed pancolitis with abnormal wall thickening of ascending, transverse, descending, sigmoid colon and rectum.  Initial lactic acid was 3.3 WBC 11.5 procalcitonin 11.4.  COVID-19 and influenza test were negative.  Blood cultures grew Haemophilus influenzae.  Because of positive blood culture and abnormal CT for acute to subacute infarct in the left posterior frontal I was asked to see patient and perform EMANUEL  I saw patient couple of days ago.  I arranged for EMANUEL but patient developed marked altered mental status and she was tachypneic and EMANUEL was postponed  I performed EMANUEL 6-7-2024 and it did not show any evidence of endocarditis.  Patient got transferred to acute rehab on 6/14/2024     Current evaluation  Patient seen and examined, medications and labs checked  She was resting on the chair  She did have a headache but no nausea no vomiting  No obvious sign of cardiopulmonary decompensation  Pulses regular  She has mild ankle edema but no calf tenderness  Systolic blood pressure 140-150  Lab work on 6/29/2024  Potassium 3.7, magnesium 1.5, albumi, hemoglobin 9.0          Medications:   Scheduled Meds:   insulin glargine  30 Units SubCUTAneous Daily    busPIRone  10 mg Oral TID    divalproex  250 mg Oral BID    insulin lispro  0-8 Units SubCUTAneous TID WC    insulin lispro  0-4 Units SubCUTAneous Nightly    magnesium oxide  400 mg Oral BID    hydrALAZINE  50 mg Oral 3 times per day    NIFEdipine  30 mg Oral Nightly    lisinopril  20 mg Oral Daily    pantoprazole  40 mg Oral QAM AC    cefTRIAXone (ROCEPHIN) IV  2,000 mg IntraVENous Q24H    lactobacillus  1 capsule Oral Daily with breakfast    sodium chloride flush  5-40 mL IntraVENous BID    rOPINIRole  2 mg Oral Nightly    latanoprost  1 drop Both Eyes Nightly    polyethylene glycol  17 g Oral Daily    atorvastatin  40 mg Oral Nightly    aspirin  81 mg Oral Daily    metoprolol tartrate  100 mg Oral BID    miconazole   Topical BID    venlafaxine  75 mg Oral Daily    
tendencies without warning which could be in part due to hearing challenges.    Activities of Daily Living  Pt politely declines ADLs this AM. Agreeable to shower tomorrow with OT.     Mobility  Sit to Stand  Assistance Level: Contact guard assist  Skilled Clinical Factors: Cues for hand placement  Stand to Sit  Assistance Level: Contact guard assist  Skilled Clinical Factors: VC for controlled descent and hand placement    Functional Mobility  Device: Rolling walker  Activity:  (few feeet w/c>recliner)  Assistance Level: Contact guard assist  Skilled Clinical Factors: Cues for RW mgmt.    OT Exercises  Exercise Treatment: Pt instruction in BUE exercises for facilitation of increased strength and activity tolerance to support safety and I with ADLs. Pt complete with 1# free weight, in all available planes, X15 reps each. Tolerates well with intermittent RB and no c/o pain or discomfort.    Motor Control/Coordination: HOGAN facilitated pt's engagement in completeing a seated BUE table-top FMC activity for improved strength and coordination for completion of self-care. Pt places small shapes into slots following correct pattern. Completes well while donning 1# wrist weight for added challenge. Tolerates well.    Assessment  Assessment  Activity Tolerance: Patient tolerated treatment well    Patient Education  Education  Education Given To: Patient  Education Provided: Role of Therapy;Plan of Care;Safety;Transfer Training;Energy Conservation;Fall Prevention Strategies  Education Method: Verbal;Demonstration  Barriers to Learning: Hearing  Education Outcome: Verbalized understanding;Demonstrated understanding;Continued education needed    Safety Devices  Safety Devices in place: Yes  Type of devices: Left in chair;Call light within reach;Chair alarm in place;Gait belt       Goals  Patient Goals   Patient goals : \"To get stronger\"  Short Term Goals  Time Frame for Short Term Goals: 5 - 7 days  Short Term Goal 1: Pt will 
(36.8 °C), Min:98.2 °F (36.8 °C), Max:98.3 °F (36.8 °C)         GEN: well developed, well nourished, no acute distress  HEENT: Normocephalic atraumatic, EOMI, mucous membranes pink and moist  CV: RRR, no murmurs, rubs or gallops  PULM: CTAB, no rales or rhonchi. Respirations WNL and unlabored  ABD: soft, NT, ND, +BS and equal  NEURO: Alert speech fluent, follows commands. Sensation intact to light touch.   MSK: 4/5 upper and lower extremity  EXTREMITIES: No calf tenderness to palpation bilaterally. No edema BLEs  SKIN: warm dry and intact with good turgor  PSYCH: appropriately interactive. Affect WNL.          Medications   Scheduled Meds:   insulin glargine  30 Units SubCUTAneous Daily    busPIRone  10 mg Oral TID    divalproex  250 mg Oral BID    insulin lispro  0-8 Units SubCUTAneous TID WC    insulin lispro  0-4 Units SubCUTAneous Nightly    magnesium oxide  400 mg Oral BID    hydrALAZINE  50 mg Oral 3 times per day    NIFEdipine  30 mg Oral Nightly    lisinopril  20 mg Oral Daily    pantoprazole  40 mg Oral QAM AC    cefTRIAXone (ROCEPHIN) IV  2,000 mg IntraVENous Q24H    lactobacillus  1 capsule Oral Daily with breakfast    sodium chloride flush  5-40 mL IntraVENous BID    rOPINIRole  2 mg Oral Nightly    latanoprost  1 drop Both Eyes Nightly    polyethylene glycol  17 g Oral Daily    atorvastatin  40 mg Oral Nightly    aspirin  81 mg Oral Daily    metoprolol tartrate  100 mg Oral BID    miconazole   Topical BID    venlafaxine  75 mg Oral Daily    spironolactone  25 mg Oral Daily    enoxaparin  40 mg SubCUTAneous Daily     Continuous Infusions:   dextrose       PRN Meds:.acetaminophen, sodium chloride flush, glucose, dextrose bolus **OR** dextrose bolus, glucagon (rDNA), dextrose, butalbital-APAP-caffeine, diclofenac sodium, senna, bisacodyl, ondansetron **OR** ondansetron     Diagnostics:     CBC:   No results for input(s): \"WBC\", \"RBC\", \"HGB\", \"HCT\", \"MCV\", \"RDW\", \"PLT\" in the last 72 hours.    BMP:   No 
2,000 mg in sodium chloride 0.9 % 50 mL IVPB (mini-bag), 2,000 mg, IntraVENous, Q24H  hydrALAZINE (APRESOLINE) tablet 100 mg, 100 mg, Oral, 3 times per day  lactobacillus (CULTURELLE) capsule 1 capsule, 1 capsule, Oral, Daily with breakfast  sodium chloride flush 0.9 % injection 5-40 mL, 5-40 mL, IntraVENous, BID  rOPINIRole (REQUIP) tablet 2 mg, 2 mg, Oral, Nightly  latanoprost (XALATAN) 0.005 % ophthalmic solution 1 drop, 1 drop, Both Eyes, Nightly  diclofenac sodium (VOLTAREN) 1 % gel 2 g, 2 g, Topical, BID PRN  acetaminophen (TYLENOL) tablet 650 mg, 650 mg, Oral, Q4H PRN  polyethylene glycol (GLYCOLAX) packet 17 g, 17 g, Oral, Daily  senna (SENOKOT) tablet 17.2 mg, 2 tablet, Oral, Daily PRN  bisacodyl (DULCOLAX) suppository 10 mg, 10 mg, Rectal, Daily PRN  acetaminophen (TYLENOL) tablet 650 mg, 650 mg, Oral, Q6H PRN **OR** acetaminophen (TYLENOL) suppository 650 mg, 650 mg, Rectal, Q6H PRN  atorvastatin (LIPITOR) tablet 40 mg, 40 mg, Oral, Nightly  aspirin EC tablet 81 mg, 81 mg, Oral, Daily  bisacodyl (DULCOLAX) suppository 10 mg, 10 mg, Rectal, Daily PRN  busPIRone (BUSPAR) tablet 10 mg, 10 mg, Per NG tube, TID  insulin glargine (LANTUS) injection vial 20 Units, 20 Units, SubCUTAneous, Daily  insulin lispro (HUMALOG,ADMELOG) injection vial 0-8 Units, 0-8 Units, SubCUTAneous, 4x Daily AC & HS  lisinopril (PRINIVIL;ZESTRIL) tablet 40 mg, 40 mg, Oral, Daily  magnesium oxide (MAG-OX) tablet 400 mg, 400 mg, Oral, Daily  metoprolol (LOPRESSOR) tablet 100 mg, 100 mg, Oral, BID  miconazole (MICOTIN) 2 % powder, , Topical, BID  NIFEdipine (ADALAT CC) extended release tablet 30 mg, 30 mg, Oral, Nightly  ondansetron (ZOFRAN-ODT) disintegrating tablet 4 mg, 4 mg, Oral, Q8H PRN **OR** ondansetron (ZOFRAN) injection 4 mg, 4 mg, IntraVENous, Q6H PRN  venlafaxine (EFFEXOR XR) extended release capsule 75 mg, 75 mg, Oral, Daily  spironolactone (ALDACTONE) tablet 25 mg, 25 mg, Oral, Daily  enoxaparin (LOVENOX) injection 40 mg, 
59 seconds)    PLAN OF CARE  Frequency: 1-2 treatment sessions per day, 5-7 days per week  Safety Devices  Type of Devices: Gait belt;Chair alarm in place;Left in chair;Call light within reach;All fall risk precautions in place    EDUCATION  Education  Education Given To: Patient  Education Provided: Mobility Training;Energy Conservation  Education Method: Demonstration;Verbal  Barriers to Learning: Cognition;Hearing           Therapy Time   07/01/24 1437   PT Individual Minutes   Time In 1336   Time Out 1406   Minutes 30   Minute Variance   Variance 60   Reason Refusal         Kayla Monet, MARGO, 07/01/24 at 2:44 PM         
AE/DME/Modified techniques for improved independence in ADLs.  Short Term Goal 7: -    Long Term Goals  Time Frame for Long Term Goals : By discharge  Long Term Goal 1: Pt will complete BADLs with SUP, Good safety and use of AE/DME/Modified techniques as needed.  Long Term Goal 2: Pt will complete functional transfers/mobility during self care tasks/functional activity with SUP, Good safety, and use of least restrictive device for improved safety and independence in ADLs.  Long Term Goal 3: Pt will tolerate static/dynamic standing 12+ minutes during functional activity with SUP for improved endurance for completion of self-care.  Long Term Goal 4: Pt will demonstrate improved BUE general strength for completion of transfers/ADLs AEB 6# increase with the dynaometer.  Long Term Goal 5: Pt will demonstrate improved BUE coordination for independence in ADLs AEB 10 second improvement in the 9HPT.  Additional Goals?: Yes  Long Term Goal 6: Pt will complete a tub transfer with SUP and Good safety to promote independence in ADLs.  Long Term Goal 7: Pt will complete light housekeeping/simple meal prep with SUP, Good safety, and use of least restrictive device.    Plan  Occupational Therapy Plan  Times Per Week: 5-7  Times Per Day: Twice a day  Current Treatment Recommendations: Strengthening, Functional mobility training, Endurance training, Pain management, Safety education & training, Patient/Caregiver education & training, Equipment evaluation, education, & procurement, Self-Care / ADL, Home management training, Cognitive/Perceptual training     07/03/24 1001   OT Individual Minutes   Time In 1001   Time Out 1054   Minutes 53         Electronically signed by LUCILLE Moscoso on 7/3/24 at 11:38 AM EDT    
Alfonso, last seen       Past Surgical  History:     Past Surgical History:   Procedure Laterality Date    CARPAL TUNNEL RELEASE Left 2022    CUBITAL TUNNEL DECOMPRESSION performed by Candice Harvey DO at [facility] OR     SECTION      x2    COLONOSCOPY  2012    ? polyps per pt    HYSTERECTOMY, TOTAL ABDOMINAL (CERVIX REMOVED)      with BSO    OTHER SURGICAL HISTORY Left 2022    cubital tunnel release    SHOULDER SURGERY Left 2021    SHOULDER MANIPULATION WITH PRE OP INTERSCALENE BLOCK and intraop injection [facility]    SPINE SURGERY Left 10/03/2022    S1 nerve root injection, Dr. Alston    SPINE SURGERY Left 10/06/2022    S1 NERVE ROOT INJECTION performed by Rema Alston MD at [facility] OR    TONSILLECTOMY      as child    UPPER GASTROINTESTINAL ENDOSCOPY  2012    [facility]       Medications:      insulin glargine  28 Units SubCUTAneous Daily    busPIRone  10 mg Oral TID    divalproex  250 mg Oral BID    insulin lispro  0-8 Units SubCUTAneous TID WC    insulin lispro  0-4 Units SubCUTAneous Nightly    magnesium oxide  400 mg Oral BID    hydrALAZINE  50 mg Oral 3 times per day    NIFEdipine  30 mg Oral Nightly    lisinopril  20 mg Oral Daily    pantoprazole  40 mg Oral QAM AC    cefTRIAXone (ROCEPHIN) IV  2,000 mg IntraVENous Q24H    lactobacillus  1 capsule Oral Daily with breakfast    sodium chloride flush  5-40 mL IntraVENous BID    rOPINIRole  2 mg Oral Nightly    latanoprost  1 drop Both Eyes Nightly    polyethylene glycol  17 g Oral Daily    atorvastatin  40 mg Oral Nightly    aspirin  81 mg Oral Daily    metoprolol tartrate  100 mg Oral BID    miconazole   Topical BID    venlafaxine  75 mg Oral Daily    spironolactone  25 mg Oral Daily    enoxaparin  40 mg SubCUTAneous Daily       Social History:     Social History     Socioeconomic History    Marital status:      Spouse name: Not on file    Number of children: Not on file    
Goals  Time Frame for Short Term Goals: 5 - 7 days  Short Term Goal 1: Pt will complete BADLs with SBA, Good safety and use of AE/DME/Modified techniques as needed.  Short Term Goal 2: Pt will complete functional transfers/mobility during self care tasks/functional activity with SBA,Good safety, and use of least restrictive device for improved safety and independence in ADLs.  Short Term Goal 3: Pt will actively participate in 30+ minutes of therapeutic exercise/functional activity/ social participation for improved activity tolerance and quality of life.  Short Term Goal 4: Pt will tolerate static/dynamic standing 8+ minutes during functional activity with SBA for improved endurance for completion of self-care.  Short Term Goal 5: Pt will verbalize/demonstrate Good understanding of 2+ Fall prevention/Home safety strategies to promote safety and independence in ADLs/IADLs.  Additional Goals?: Yes  Short Term Goal 6: Pt will verbalize/demonstrate Good understanding of AE/DME/Modified techniques for improved independence in ADLs.  Short Term Goal 7: -    Long Term Goals  Time Frame for Long Term Goals : By discharge  Long Term Goal 1: Pt will complete BADLs with SUP, Good safety and use of AE/DME/Modified techniques as needed.  Long Term Goal 2: Pt will complete functional transfers/mobility during self care tasks/functional activity with SUP, Good safety, and use of least restrictive device for improved safety and independence in ADLs.  Long Term Goal 3: Pt will tolerate static/dynamic standing 12+ minutes during functional activity with SUP for improved endurance for completion of self-care.  Long Term Goal 4: Pt will demonstrate improved BUE general strength for completion of transfers/ADLs AEB 6# increase with the dynaometer.  Long Term Goal 5: Pt will demonstrate improved BUE coordination for independence in ADLs AEB 10 second improvement in the 9HPT.  Additional Goals?: Yes  Long Term Goal 6: Pt will complete a 
Pt does not verbally state  Short Term Goals  Time Frame for Short Term Goals: 7 days  Short Term Goal 1: Pt to demo All bed mobility on a flat bed without rails and CGA  Short Term Goal 2: Pt to demo safe functional transfers with device and CGA  Short Term Goal 3: Pt to ambulate 50-75' with device and SBA  Short Term Goal 4: Pt to negotiate 12-20 steps with single rail and device as needed with CGA to allow for home access  Long Term Goals  Time Frame for Long Term Goals : Until DC  Long Term Goal 1: Pt to demo IND bed mobility on flat bed without rails  Long Term Goal 2: Pt to demo safe functional transfers with device Mod I  Long Term Goal 3: Pt to improve on 2 MWT by at least 50' to dec risk for falls and normalize gait speed  Long Term Goal 4: Pt to ambulate >150' level surfaces with  supervision, >50' paved/uneven terrains with SBA using appropriate AD  Long Term Goal 5: Pt to demo car transfer with device and SBA  Additional Goals?: Yes  Long term goal 6: Caregiver/family to demonstrate provision of adequate assist/safety techniques during patient's mobility performance during family training  Long term goal 7: Pt to improve on TUG by 10 seconds or greater to demo dec fall risk. (Original TUG score 59 seconds)    PLAN OF CARE  Frequency: 1-2 treatment sessions per day, 5-7 days per week  Physical Therapy Plan  General Plan:  minutes of therapy at least 5 out of 7 days a week  Current Treatment Recommendations: Strengthening;Balance training;Functional mobility training;Transfer training;Gait training;Cognitive/Perceptual training;Safety education & training;Patient/Caregiver education & training;Positioning;Therapeutic activities;Endurance training;Stair training;Pain management  Safety Devices  Type of Devices: Gait belt;Chair alarm in place;Left in chair;Call light within reach;Bed alarm in place;Left in bed    EDUCATION  Education  Education Given To: Patient  Education Provided: Safety;Transfer 
SHOULDER MANIPULATION WITH PRE OP INTERSCALENE BLOCK and intraop injection performed by Ahsan Brown DO at [facility] OR    SPINE SURGERY Left 10/03/2022    S1 nerve root injection, Dr. Alston    SPINE SURGERY Left 10/06/2022    S1 NERVE ROOT INJECTION performed by Rema Alston MD at [facility] OR    TONSILLECTOMY      as child    UPPER GASTROINTESTINAL ENDOSCOPY  07/23/2012    [facility]       Medications:      insulin glargine  30 Units SubCUTAneous Daily    busPIRone  10 mg Oral TID    divalproex  250 mg Oral BID    insulin lispro  0-8 Units SubCUTAneous TID WC    insulin lispro  0-4 Units SubCUTAneous Nightly    magnesium oxide  400 mg Oral BID    hydrALAZINE  50 mg Oral 3 times per day    NIFEdipine  30 mg Oral Nightly    lisinopril  20 mg Oral Daily    pantoprazole  40 mg Oral QAM AC    cefTRIAXone (ROCEPHIN) IV  2,000 mg IntraVENous Q24H    lactobacillus  1 capsule Oral Daily with breakfast    sodium chloride flush  5-40 mL IntraVENous BID    rOPINIRole  2 mg Oral Nightly    latanoprost  1 drop Both Eyes Nightly    polyethylene glycol  17 g Oral Daily    atorvastatin  40 mg Oral Nightly    aspirin  81 mg Oral Daily    metoprolol tartrate  100 mg Oral BID    miconazole   Topical BID    venlafaxine  75 mg Oral Daily    spironolactone  25 mg Oral Daily    enoxaparin  40 mg SubCUTAneous Daily       Social History:     Social History     Socioeconomic History    Marital status:      Spouse name: Not on file    Number of children: Not on file    Years of education: Not on file    Highest education level: Not on file   Occupational History    Not on file   Tobacco Use    Smoking status: Never    Smokeless tobacco: Never   Vaping Use    Vaping Use: Never used   Substance and Sexual Activity    Alcohol use: Yes     Alcohol/week: 0.0 standard drinks of alcohol     Comment: rarely/ 4 times a year    Drug use: Never    Sexual activity: Yes   Other Topics Concern    Not on file   Social History Narrative    
Training;Transfer Training  Education Method: Verbal;Demonstration  Barriers to Learning: Hearing  Education Outcome: Verbalized understanding;Demonstrated understanding;Continued education needed    OT Equipment Recommendations  Other: TBD    Safety Devices  Safety Devices in place: Yes  Type of devices: All fall risk precautions in place     Goals  Patient Goals   Patient goals : \"To get stronger\"  Short Term Goals  Time Frame for Short Term Goals: 5 - 7 days  Short Term Goal 1: Pt will complete BADLs with SBA, Good safety and use of AE/DME/Modified techniques as needed.  Short Term Goal 2: Pt will complete functional transfers/mobility during self care tasks/functional activity with SBA,Good safety, and use of least restrictive device for improved safety and independence in ADLs.  Short Term Goal 3: Pt will actively participate in 30+ minutes of therapeutic exercise/functional activity/ social participation for improved activity tolerance and quality of life.  Short Term Goal 4: Pt will tolerate static/dynamic standing 8+ minutes during functional activity with SBA for improved endurance for completion of self-care.  Short Term Goal 5: Pt will verbalize/demonstrate Good understanding of 2+ Fall prevention/Home safety strategies to promote safety and independence in ADLs/IADLs.  Additional Goals?: Yes  Short Term Goal 6: Pt will verbalize/demonstrate Good understanding of AE/DME/Modified techniques for improved independence in ADLs.  Short Term Goal 7: -    Long Term Goals  Time Frame for Long Term Goals : By discharge  Long Term Goal 1: Pt will complete BADLs with SUP, Good safety and use of AE/DME/Modified techniques as needed.  Long Term Goal 2: Pt will complete functional transfers/mobility during self care tasks/functional activity with SUP, Good safety, and use of least restrictive device for improved safety and independence in ADLs.  Long Term Goal 3: Pt will tolerate static/dynamic standing 12+ minutes 
discharge;Continue to assess pending progress    Patient Education  Education  Education Given To: Patient  Education Provided: Role of Therapy;Plan of Care;Safety;ADL Function;Fall Prevention Strategies;Energy Conservation;Transfer Training;Home Exercise Program  Education Method: Verbal;Demonstration  Barriers to Learning: Hearing  Education Outcome: Verbalized understanding;Demonstrated understanding;Continued education needed    Goals  Patient Goals   Patient goals : \"To get stronger\"  Short Term Goals  Time Frame for Short Term Goals: 5 - 7 days  Short Term Goal 1: Pt will complete BADLs with SBA, Good safety and use of AE/DME/Modified techniques as needed.  Short Term Goal 2: Pt will complete functional transfers/mobility during self care tasks/functional activity with SBA,Good safety, and use of least restrictive device for improved safety and independence in ADLs.  Short Term Goal 3: Pt will actively participate in 30+ minutes of therapeutic exercise/functional activity/ social participation for improved activity tolerance and quality of life.  Short Term Goal 4: Pt will tolerate static/dynamic standing 8+ minutes during functional activity with SBA for improved endurance for completion of self-care.  Short Term Goal 5: Pt will verbalize/demonstrate Good understanding of 2+ Fall prevention/Home safety strategies to promote safety and independence in ADLs/IADLs.  Additional Goals?: Yes  Short Term Goal 6: Pt will verbalize/demonstrate Good understanding of AE/DME/Modified techniques for improved independence in ADLs.  Short Term Goal 7: -    Long Term Goals  Time Frame for Long Term Goals : By discharge  Long Term Goal 1: Pt will complete BADLs with SUP, Good safety and use of AE/DME/Modified techniques as needed.  Long Term Goal 2: Pt will complete functional transfers/mobility during self care tasks/functional activity with SUP, Good safety, and use of least restrictive device for improved safety and 
distress  HEENT: Normocephalic atraumatic, EOMI, mucous membranes pink and moist  CV: RRR, no murmurs, rubs or gallops  PULM: CTAB, no rales or rhonchi. Respirations WNL and unlabored  ABD: soft, NT, ND, +BS and equal  NEURO: Alert speech fluent, follows commands. Sensation intact to light touch.   MSK: 4/5 upper and lower extremity  EXTREMITIES: No calf tenderness to palpation bilaterally. No edema BLEs  SKIN: warm dry and intact with good turgor  PSYCH: appropriately interactive. Affect WNL.          Medications   Scheduled Meds:   insulin glargine  28 Units SubCUTAneous Daily    busPIRone  10 mg Oral TID    divalproex  250 mg Oral BID    insulin lispro  0-8 Units SubCUTAneous TID WC    insulin lispro  0-4 Units SubCUTAneous Nightly    magnesium oxide  400 mg Oral BID    hydrALAZINE  50 mg Oral 3 times per day    NIFEdipine  30 mg Oral Nightly    lisinopril  20 mg Oral Daily    pantoprazole  40 mg Oral QAM AC    cefTRIAXone (ROCEPHIN) IV  2,000 mg IntraVENous Q24H    lactobacillus  1 capsule Oral Daily with breakfast    sodium chloride flush  5-40 mL IntraVENous BID    rOPINIRole  2 mg Oral Nightly    latanoprost  1 drop Both Eyes Nightly    polyethylene glycol  17 g Oral Daily    atorvastatin  40 mg Oral Nightly    aspirin  81 mg Oral Daily    metoprolol tartrate  100 mg Oral BID    miconazole   Topical BID    venlafaxine  75 mg Oral Daily    spironolactone  25 mg Oral Daily    enoxaparin  40 mg SubCUTAneous Daily     Continuous Infusions:   dextrose       PRN Meds:.sodium chloride flush, glucose, dextrose bolus **OR** dextrose bolus, glucagon (rDNA), dextrose, butalbital-APAP-caffeine, diclofenac sodium, senna, bisacodyl, ondansetron **OR** ondansetron     Diagnostics:     CBC:   Recent Labs     06/24/24  1044   HGB 9.5*   HCT 30.5*       BMP:   Recent Labs     06/24/24  1044      K 3.8      CO2 28   BUN 14   CREATININE 0.7       BNP: No results for input(s): \"BNP\" in the last 72 hours.  PT/INR: No 
rales or rhonchi. Respirations WNL and unlabored  ABD: soft, NT, ND, +BS and equal  NEURO: Alert speech fluent, follows commands. Sensation intact to light touch.   MSK: 4/5 upper and lower extremity-no change  EXTREMITIES: No calf tenderness to palpation bilaterally. No edema BLEs  SKIN: warm dry and intact with good turgor  PSYCH: appropriately interactive. Affect WNL.          Medications   Scheduled Meds:   insulin glargine  30 Units SubCUTAneous Daily    busPIRone  10 mg Oral TID    divalproex  250 mg Oral BID    insulin lispro  0-8 Units SubCUTAneous TID WC    insulin lispro  0-4 Units SubCUTAneous Nightly    magnesium oxide  400 mg Oral BID    hydrALAZINE  50 mg Oral 3 times per day    NIFEdipine  30 mg Oral Nightly    lisinopril  20 mg Oral Daily    pantoprazole  40 mg Oral QAM AC    cefTRIAXone (ROCEPHIN) IV  2,000 mg IntraVENous Q24H    lactobacillus  1 capsule Oral Daily with breakfast    sodium chloride flush  5-40 mL IntraVENous BID    rOPINIRole  2 mg Oral Nightly    latanoprost  1 drop Both Eyes Nightly    polyethylene glycol  17 g Oral Daily    atorvastatin  40 mg Oral Nightly    aspirin  81 mg Oral Daily    metoprolol tartrate  100 mg Oral BID    miconazole   Topical BID    venlafaxine  75 mg Oral Daily    spironolactone  25 mg Oral Daily    enoxaparin  40 mg SubCUTAneous Daily     Continuous Infusions:   dextrose       PRN Meds:.acetaminophen, sodium chloride flush, glucose, dextrose bolus **OR** dextrose bolus, glucagon (rDNA), dextrose, butalbital-APAP-caffeine, diclofenac sodium, senna, bisacodyl, ondansetron **OR** ondansetron     Diagnostics:     CBC:   Recent Labs     06/29/24  0659   WBC 7.0   RBC 3.28*   HGB 9.0*   HCT 28.8*   MCV 87.9   RDW 16.8*          BMP:   Recent Labs     06/29/24  0659      K 3.7      CO2 30   BUN 12   CREATININE 0.6       BNP: No results for input(s): \"BNP\" in the last 72 hours.  PT/INR: No results for input(s): \"PROTIME\", \"INR\" in the last 72 
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  On rocephin daily through 7/10/24, per ID.  On lactobacillus daily.  Headache:  Repeat CT head showed no changes.  Added oxycodone as needed on 6/18.  Neurology following - toradol completed, decadron completed, added depakote on 6/21, added as-needed fioricet on 6/21, MRI brain and MRV unremarkable for intracranial pathology, also on oral magnesium, may consider medrol dose yao and increased in depakote if headache recurs.  Unable to give IV magnesium in acute rehab.  Bilateral mastoid effusions:  Noted on CT head and MRI brain.  On rocephin as above.  Follow up with ENT as outpatient.  As patient is clinically improving and remains on IV antibiotics, ENT did not feel that she needs any inpatient intervention at this time (discussed on 6/22).  Discussed case with neurology and IM.  Left frontal lobe infarct:  On aspirin, atorvastatin.  Has 30-day event monitor in place (appears to have started 6/13/24).  Anemia:  Hemoglobin 8.4 on 6/22, stable.  Monitoring.  HTN:  On hydralazine - decreased by cardiology on 6/20, lisinopril - decreased by cardiology for 6/21, metoprolol, nifedipine, spironolactone  HLD:  On atorvastatin  Type 2 diabetes:  On lantus daily, humalog sliding scale  DIEGO:  With elevated LFTs in acute care, now resolved.  CKD:  Creatinine 0.7 on 6/22, stable.  Monitoring.  GERD:  On protonix (started 6/20 by neurology)  KVNG:  Follows with Dr. Huffman  Depression:  On buspar, venlafaxine  Mild malnutrition:  Dietician following.  On oral nutrition supplements.  Obesity:  BMI 36.03.  Dietician following  Bowel Management: Miralax daily, senokot prn, dulcolax prn.  DVT prophylaxis:  lovenox  Internal Medicine for medical management  Follow up PCP 1-2 weeks, Neurology, GI, ENT (will need referral)      Electronically signed by Chelsie Haynes MD on 6/23/2024 at 9:58 PM      
Rectal, Daily PRN  atorvastatin (LIPITOR) tablet 40 mg, 40 mg, Oral, Nightly  aspirin EC tablet 81 mg, 81 mg, Oral, Daily  metoprolol (LOPRESSOR) tablet 100 mg, 100 mg, Oral, BID  miconazole (MICOTIN) 2 % powder, , Topical, BID  ondansetron (ZOFRAN-ODT) disintegrating tablet 4 mg, 4 mg, Oral, Q8H PRN **OR** ondansetron (ZOFRAN) injection 4 mg, 4 mg, IntraVENous, Q6H PRN  venlafaxine (EFFEXOR XR) extended release capsule 75 mg, 75 mg, Oral, Daily  enoxaparin (LOVENOX) injection 40 mg, 40 mg, SubCUTAneous, Daily      Impression/Plan:   Impaired ADLs, gait, and mobility due to:    Encephalopathy and secondary to Haemophilus influenza meningitis: PT/OT for gait, mobility, strengthening, endurance, ADLs, and self care.  On rocephin daily through 7/10/24, per ID.  On lactobacillus daily.  Headache:  Head CT stable. Neurology following - toradol completed, decadron completed, added depakote on 6/21, magnesium, added as-needed fioricet on 6/21, recommended avoiding oxycodone for headache.  MRI brain and MRV unremarkable for intracranial pathology. Needs close follow-up with ENT. May benefit from muscle relaxer prn. Added prn tizanidine.   Bilateral mastoid effusions:  Incidental finding.  On rocephin as above per ID to complete through 7/10/2020 for complete 6-week course.  Follow up with ENT as outpatient.    Left frontal lobe infarct:  On aspirin, atorvastatin.  Has 30-day event monitor in place (appears to have started 6/13/24).  Follow-up cardiology outpatient  Anemia:  Hemoglobin low but stable.  Monitoring.  HTN:  On hydralazine - decreased by cardiology on 6/20, lisinopril - decreased by cardiology for 6/21, metoprolol, nifedipine, spironolactone. Cardiology following and adjusting medications/monitoring serum K.  HLD:  On atorvastatin  Type 2 diabetes:  On lantus daily, humalog sliding scale. Was on Synjardy and glipizide at home - IM adjusting due to HbA1c 10 indicating poor control. DM education ongoing with 
illness, septic shock required pressor agents, blood culture positive haemophilus influenza  3.Status post lumbar puncture 5/29/2024 cloudy fluid, 280 WBC, meningitis panel was positive for haemophilus influenza  4.EMANUEL 6/7/2024 no evidence of endocarditis  5.Acute to subacute infarct in the left posterior frontal lobe  6.Normal LV systolic function  7.Diabetes mellitus,  hyperlipidemia  8: Hypertension uncontrolled multiple medication, low potassium most likely hyperaldosteronism significant improvement in blood pressure with Aldactone  9: Episode of 30 beats polymorphic V. tach heart rate more than 250 on 6/9/2024 at 9:30 PM patient had potassium 3.3 and magnesium 1.5  Episodes of 6 beat V. tach and 8 beats supraventricular tachycardia on 6/10/2024 at 9:30 AM serum potassium was 3.6     No further episode of V. tach since potassium and magnesium levels are within normal    Patient Active Problem List:     Cervical spondylosis     Type 2 diabetes mellitus without complication, without long-term current use of insulin (HCC)     Hypertension     Abnormal auditory perception     Nasal polyps     Osteoarthritis of left knee     Osteoarthritis of right knee     DIEGO (nonalcoholic steatohepatitis)     Vitamin D deficiency     Iron deficiency anemia     Dyslipidemia     Trochanteric bursitis     Chronic left shoulder pain     Restless legs syndrome     Generalized anxiety disorder     Acute encephalopathy     Class 2 obesity in adult     Leg edema     Recurrent major depressive disorder (HCC)     Dermatitis     Retrolisthesis of vertebrae     Bilateral carpal tunnel syndrome     Intention tremor     Sciatica     Back pain with radiation     Left sciatic nerve pain     Lumbar disc disease with radiculopathy     Intractable back pain     Piriformis syndrome of left side     Ulnar neuropathy at elbow, left     S/P decompression of ulnar nerve at elbow     Cervical myelopathy with cervical radiculopathy (HCC)     Lumbar 
self-care.  Long Term Goal 4: Pt will demonstrate improved BUE general strength for completion of transfers/ADLs AEB 6# increase with the dynaometer.  Long Term Goal 5: Pt will demonstrate improved BUE coordination for independence in ADLs AEB 10 second improvement in the 9HPT.  Additional Goals?: Yes  Long Term Goal 6: Pt will complete a tub transfer with SUP and Good safety to promote independence in ADLs.  Long Term Goal 7: Pt will complete light housekeeping/simple meal prep with SUP, Good safety, and use of least restrictive device.    Plan  Occupational Therapy Plan  Times Per Week: 5-7  Times Per Day: Twice a day  Current Treatment Recommendations: Strengthening, Functional mobility training, Endurance training, Pain management, Safety education & training, Patient/Caregiver education & training, Equipment evaluation, education, & procurement, Self-Care / ADL, Home management training, Cognitive/Perceptual training         06/24/24 1129 06/24/24 1500   OT Individual Minutes   Time In 1107 1404   Time Out 1204 1439   Minutes 57 35       Electronically signed by LUCILLE Moscoso on 6/24/24 at 3:45 PM EDT    
transfers/ADLs AEB 6# increase with the dynaometer.  Long Term Goal 5: Pt will demonstrate improved BUE coordination for independence in ADLs AEB 10 second improvement in the 9HPT.  Additional Goals?: Yes  Long Term Goal 6: Pt will complete a tub transfer with SUP and Good safety to promote independence in ADLs.  Long Term Goal 7: Pt will complete light housekeeping/simple meal prep with SUP, Good safety, and use of least restrictive device.    Plan  Occupational Therapy Plan  Times Per Week: 5-7  Times Per Day: Twice a day  Current Treatment Recommendations: Strengthening, Functional mobility training, Endurance training, Pain management, Safety education & training, Patient/Caregiver education & training, Equipment evaluation, education, & procurement, Self-Care / ADL, Home management training, Cognitive/Perceptual training       06/25/24 1103 06/25/24 1400   OT Individual Minutes   Time In 1103 1410   Time Out 1210 1443   Minutes 67 33         Electronically signed by LUCILLE Moscoso on 6/25/24 at 3:35 PM EDT    
oriented to person, place, and time.      Cranial Nerves: No cranial nerve deficit.       Investigations:      Laboratory Testing:  No results found for this or any previous visit (from the past 24 hour(s)).      Imaging/Diagnostics:        Assessment :      Primary Problem  Encephalopathy    Active Hospital Problems    Diagnosis Date Noted    Mild malnutrition (HCC) [E44.1] 06/16/2024    Encephalopathy [G93.40] 06/14/2024       Plan:     Patient is 64-year-old female with multiple comorbidities including diabetes, hypertension, CKD had long hospitalization at [facility], was treated for H. influenzae bacteremia and meningitis, mastoiditis, acute infarct and sepsis, now admitted at [facility] acute rehab for further care    Acute metabolic encephalopathy secondary to H. influenzae back meningitis/bacteremia, on IV Rocephin, last dose 7/10, had EMANUEL did not show any vegetation, encephalopathy now improved, patient also had EEG did not show any seizure, was on Keppra which has not been    Acute mastoiditis, ID on board, continue antibiotics, will need evaluation as ENT    Small acute/subacute posterior frontal infarct, CTA did not show any acute findings, EMANUEL negative, patient to have Holter monitor at the time of discharge, on aspirin and Lipitor      Insulin dependent diabetes mellitus blood sugar has been    Hypertension, blood pressure has been now getting better, on multiple medications    Episode of NSVT, no more episode, had echocardiogram showed preserved ejection fraction,    Was also treated for C. difficile during hospitalization finished course of vancomycin    EMEA likely anemia of chronic disease    DVT prophylaxis      6/16  Patient seen and examined  Vitals have been stable continues to have headache slightly better today  No dizziness blurred vision, no chest pain shortness of breath  Patient complaining of abdominal cramping on Rocephin, will give probiotics  Consultations:   IP CONSULT TO 
  Intractable headaches after bacterial meningitis     Plan:      Given continued headache, in abundance of caution, will repeat MRI brain and obtain MRV w/wo.  Continue headache cocktail with Toradol, Decadron.  On maintenance IV fluids.  On magnesium which should help as well.  Limit opioids for severe pain/headache. Will also given  mg IV x 1. She has had side effects to Benadryl - avoid.  Will likely need daily HA medication. Hx of glaucoma, will defer TPM. She is on venlafaxine which can be further optimized. Course of VPA is also an option if it helps.   CT brain with mastoid effusions.  Has been maintained on antibiotics.  Needs ENT follow-up. Unfortunately, no ENT coverage at our facility.  Rest of care per primary/IM team.        We will continue to follow.      Electronically signed by Jolynn Ferguson DO on 6/20/2024 at 6:55 PM      Jolynn Ferguson DO  [facility] [place]  Neurology    
effusion with opacification of the left middle, neurology following she received Decadron  now on Depakote 250 mg 2 times a day, ceftriaxone 2000 mg every 24 hours ,headache has improved she is also on Effexor 75 mg daily    Today's potassium was 4 and magnesium 1.8 continue current medication  Most likely discharge home tomorrow  Electronically signed by Tawny Zelaya MD on 7/2/2024 at 3:43 PM  
Effexor already  If no improvement, will consult neurology    6/19  Patient seen and examined  Vitals have been stable continues to complain of headache  Had multiple CTs  Magnesium 1.5 today will start p.o. mag  Neurology consulted  Patient was hyperglycemic today likely secondary to underlying steroid, on sliding scale and Lantus     6/20  Patient seen and examined continues to complain of headache, started on Decadron by neurology  Patient blood sugar not getting checked on Lantus and sliding scale and on steroids, expect steroid-induced hyperglycemia, will need to get sliding scale and frequent blood sugar check  Vitals have been stable  On IV Rocephin    6/21  Blood sugar check at noon today was 213 no other values to review ordering ACHS POCT blood sugars  Appreciate neurology recommendations MRI brain and MRV repeated and in process  Headache    6/22  MRI brain showed severe bilateral mastoid effusions worse from previous CT scan patient is on steroids and antibiotics  Persistent headache discussed with rehab physician-consulting ID  Blood sugars have been running high more than 250 all day-likely secondary to steroid-will increase Lantus dose to 25 units  I was able to speak with Dr. Harris in person-recommending transfer to [place] for severe bilateral mastoid effusions and left otitis media  Discussed with Dr. Haynes-she will kindly initiate transfer.    6/23  Dr. Haynes discussed with ENT yesterday no need for transfer since patient clinically doing well  ID has been consulted appreciate recommendations  Blood sugar slightly better controlled today 170 fasting we will continue current dose of insulin blood pressure heart rate controlled no fevers.  Patient participating in therapy.  Reports headache is better    6/24  Fasting blood sugar 180 today, increasing Lantus dose to 28 units  Patient will need to be discharged on insulin that would be new medication for her   A1c on 6/4 was 10.9  Today 
[place] for severe bilateral mastoid effusions and left otitis media  Discussed with Dr. Haynes-she will kindly initiate transfer.    6/23  Dr. Haynes discussed with ENT yesterday no need for transfer since patient clinically doing well  ID has been consulted appreciate recommendations  Blood sugar slightly better controlled today 170 fasting we will continue current dose of insulin blood pressure heart rate controlled no fevers.  Patient participating in therapy.  Reports headache is better    6/24  Fasting blood sugar 180 today, increasing Lantus dose to 28 units  Patient will need to be discharged on insulin that would be new medication for her   A1c on 6/4 was 10.9  Today patient is reporting improvement in headache, has been participating in therapy no new symptoms    6/25  Fasting sugar was 197 today, unclear if this was fasting, since she receives Lantus in the morning, will continue on current dose for now review blood sugars tomorrow.  Labs and vitals reviewed and satisfactory  Patient's headache has resolved  Blood pressure and heart rate is controlled    6/26  Doing better,   A.m. blood sugar pressure elevated-to be repeated after medications  Blood sugars 1 60-1 70 range today  Patient reports feeling well, ready for discharge    6/27  Labs and vitals reviewed and satisfactory  Blood sugars around 180 sustaining.  Will increase Lantus dose to 30 units daily  No objections to discharge      consultations:   IP CONSULT TO DIETITIAN  IP CONSULT TO SOCIAL WORK  IP CONSULT TO INTERNAL MEDICINE  IP CONSULT TO CARDIOLOGY  IP CONSULT TO NEUROLOGY  IP CONSULT TO INFECTIOUS DISEASES  IP CONSULT TO DIABETES EDUCATOR     Patient is admitted as inpatient status because of co-morbidities listed above, severity of signs and symptoms as outlined, requirement for current medical therapies and most importantly because of direct risk to patient if care not provided in a hospital setting.    Ольга Peguero, 
MD  7/2/2024  6:48 PM    Copy sent to Shaina Matthews MD    Please note that this chart was generated using voice recognition Dragon dictation software.  Although every effort was made to ensure the accuracy of this automated transcription, some errors in transcription may have occurred.

## 2024-07-03 NOTE — PLAN OF CARE
Problem: Discharge Planning  Goal: Discharge to home or other facility with appropriate resources  6/15/2024 1054 by Staci Lima RN  Outcome: Progressing  6/15/2024 0216 by Luci Ramos RN  Outcome: Progressing     Problem: Safety - Medical Restraint  Goal: Remains free of injury from restraints (Restraint for Interference with Medical Device)  Description: INTERVENTIONS:  1. Determine that other, less restrictive measures have been tried or would not be effective before applying the restraint  2. Evaluate the patient's condition at the time of restraint application  3. Inform patient/family regarding the reason for restraint  4. Q2H: Monitor safety, psychosocial status, comfort, nutrition and hydration  6/15/2024 1054 by Staci Lima RN  Outcome: Progressing  6/15/2024 0216 by Luci Ramos RN  Outcome: Progressing     Problem: Safety - Adult  Goal: Free from fall injury  6/15/2024 1054 by Staci Lima RN  Outcome: Progressing  6/15/2024 0216 by Luci Ramos RN  Outcome: Progressing     Problem: Skin/Tissue Integrity  Goal: Absence of new skin breakdown  Description: 1.  Monitor for areas of redness and/or skin breakdown  2.  Assess vascular access sites hourly  3.  Every 4-6 hours minimum:  Change oxygen saturation probe site  4.  Every 4-6 hours:  If on nasal continuous positive airway pressure, respiratory therapy assess nares and determine need for appliance change or resting period.  6/15/2024 1054 by Staci Lima RN  Outcome: Progressing  6/15/2024 0216 by Luci Ramos RN  Outcome: Progressing     Problem: ABCDS Injury Assessment  Goal: Absence of physical injury  6/15/2024 1054 by Staci Lima RN  Outcome: Progressing  6/15/2024 0216 by Luci Ramos RN  Outcome: Progressing     Problem: Pain  Goal: Verbalizes/displays adequate comfort level or baseline comfort level  Outcome: Progressing     
  Problem: Discharge Planning  Goal: Discharge to home or other facility with appropriate resources  6/15/2024 2125 by Ashly Glass RN  Outcome: Progressing  Flowsheets (Taken 6/15/2024 2010)  Discharge to home or other facility with appropriate resources:   Identify barriers to discharge with patient and caregiver   Arrange for needed discharge resources and transportation as appropriate   Identify discharge learning needs (meds, wound care, etc)  6/15/2024 1054 by Staci Lima RN  Outcome: Progressing     Problem: Safety - Medical Restraint  Goal: Remains free of injury from restraints (Restraint for Interference with Medical Device)  Description: INTERVENTIONS:  1. Determine that other, less restrictive measures have been tried or would not be effective before applying the restraint  2. Evaluate the patient's condition at the time of restraint application  3. Inform patient/family regarding the reason for restraint  4. Q2H: Monitor safety, psychosocial status, comfort, nutrition and hydration  6/15/2024 2125 by Ashly Glass RN  Outcome: Progressing  6/15/2024 1054 by Staci Lima RN  Outcome: Progressing     Problem: Safety - Adult  Goal: Free from fall injury  6/15/2024 2125 by Ashly Glass RN  Outcome: Progressing  6/15/2024 1054 by Staci Lima RN  Outcome: Progressing     Problem: Skin/Tissue Integrity  Goal: Absence of new skin breakdown  Description: 1.  Monitor for areas of redness and/or skin breakdown  2.  Assess vascular access sites hourly  3.  Every 4-6 hours minimum:  Change oxygen saturation probe site  4.  Every 4-6 hours:  If on nasal continuous positive airway pressure, respiratory therapy assess nares and determine need for appliance change or resting period.  6/15/2024 2125 by Ashly Glass RN  Outcome: Progressing  6/15/2024 1054 by Staci Lima RN  Outcome: Progressing     Problem: ABCDS Injury Assessment  Goal: Absence of physical injury  6/15/2024 2125 by Ashly Glass 
  Problem: Discharge Planning  Goal: Discharge to home or other facility with appropriate resources  6/17/2024 0921 by Staci Lima RN  Outcome: Progressing  6/17/2024 0110 by Annabelle Fiore LPN  Outcome: Progressing     Problem: Safety - Medical Restraint  Goal: Remains free of injury from restraints (Restraint for Interference with Medical Device)  Description: INTERVENTIONS:  1. Determine that other, less restrictive measures have been tried or would not be effective before applying the restraint  2. Evaluate the patient's condition at the time of restraint application  3. Inform patient/family regarding the reason for restraint  4. Q2H: Monitor safety, psychosocial status, comfort, nutrition and hydration  6/17/2024 0921 by Staci Lima RN  Outcome: Progressing  6/17/2024 0110 by Annabelle Fiore LPN  Outcome: Progressing     Problem: Safety - Adult  Goal: Free from fall injury  6/17/2024 0921 by Staci Lima RN  Outcome: Progressing  6/17/2024 0110 by Annabelle Fiore LPN  Outcome: Progressing  Flowsheets (Taken 6/16/2024 1627 by Daksha Rodriguez, RN)  Free From Fall Injury:   Instruct family/caregiver on patient safety   Based on caregiver fall risk screen, instruct family/caregiver to ask for assistance with transferring infant if caregiver noted to have fall risk factors     Problem: Skin/Tissue Integrity  Goal: Absence of new skin breakdown  Description: 1.  Monitor for areas of redness and/or skin breakdown  2.  Assess vascular access sites hourly  3.  Every 4-6 hours minimum:  Change oxygen saturation probe site  4.  Every 4-6 hours:  If on nasal continuous positive airway pressure, respiratory therapy assess nares and determine need for appliance change or resting period.  6/17/2024 0921 by Staci Lima RN  Outcome: Progressing  6/17/2024 0110 by Annabelle Fiore LPN  Outcome: Progressing     Problem: ABCDS Injury Assessment  Goal: Absence of physical injury  6/17/2024 0921 by Clarence 
  Problem: Discharge Planning  Goal: Discharge to home or other facility with appropriate resources  6/19/2024 0001 by Luci Ramos RN  Outcome: Progressing     Problem: Safety - Medical Restraint  Goal: Remains free of injury from restraints (Restraint for Interference with Medical Device)  Description: INTERVENTIONS:  1. Determine that other, less restrictive measures have been tried or would not be effective before applying the restraint  2. Evaluate the patient's condition at the time of restraint application  3. Inform patient/family regarding the reason for restraint  4. Q2H: Monitor safety, psychosocial status, comfort, nutrition and hydration  6/19/2024 0001 by Luci Ramos RN  Outcome: Progressing     Problem: Safety - Adult  Goal: Free from fall injury  6/19/2024 0001 by Luci Ramos RN  Outcome: Progressing     Problem: Skin/Tissue Integrity  Goal: Absence of new skin breakdown  Description: 1.  Monitor for areas of redness and/or skin breakdown  2.  Assess vascular access sites hourly  3.  Every 4-6 hours minimum:  Change oxygen saturation probe site  4.  Every 4-6 hours:  If on nasal continuous positive airway pressure, respiratory therapy assess nares and determine need for appliance change or resting period.  6/19/2024 0001 by Luci Ramos RN  Outcome: Progressing     Problem: ABCDS Injury Assessment  Goal: Absence of physical injury  6/19/2024 0001 by Luci Ramos RN  Outcome: Progressing     Problem: Pain  Goal: Verbalizes/displays adequate comfort level or baseline comfort level  6/19/2024 0001 by Luci Ramos RN  Outcome: Progressing  Flowsheets (Taken 6/19/2024 0001)  Verbalizes/displays adequate comfort level or baseline comfort level:   Encourage patient to monitor pain and request assistance   Administer analgesics based on type and severity of pain and evaluate response   Assess pain using appropriate pain scale   Implement non-pharmacological 
  Problem: Discharge Planning  Goal: Discharge to home or other facility with appropriate resources  6/19/2024 0936 by Staci Lima RN  Outcome: Progressing  6/19/2024 0001 by Luci Ramos RN  Outcome: Progressing     Problem: Safety - Medical Restraint  Goal: Remains free of injury from restraints (Restraint for Interference with Medical Device)  Description: INTERVENTIONS:  1. Determine that other, less restrictive measures have been tried or would not be effective before applying the restraint  2. Evaluate the patient's condition at the time of restraint application  3. Inform patient/family regarding the reason for restraint  4. Q2H: Monitor safety, psychosocial status, comfort, nutrition and hydration  6/19/2024 0936 by Staci Lima RN  Outcome: Progressing  6/19/2024 0001 by Luci Ramos RN  Outcome: Progressing     Problem: Safety - Adult  Goal: Free from fall injury  6/19/2024 0936 by Staci Lima RN  Outcome: Progressing  6/19/2024 0001 by Luci Ramos RN  Outcome: Progressing     Problem: Skin/Tissue Integrity  Goal: Absence of new skin breakdown  Description: 1.  Monitor for areas of redness and/or skin breakdown  2.  Assess vascular access sites hourly  3.  Every 4-6 hours minimum:  Change oxygen saturation probe site  4.  Every 4-6 hours:  If on nasal continuous positive airway pressure, respiratory therapy assess nares and determine need for appliance change or resting period.  6/19/2024 0936 by Staci Lima RN  Outcome: Progressing  6/19/2024 0007 by Luci Ramos RN  Outcome: Progressing  6/19/2024 0001 by Luci Ramos RN  Outcome: Progressing     Problem: ABCDS Injury Assessment  Goal: Absence of physical injury  6/19/2024 0936 by Staci Lima RN  Outcome: Progressing  6/19/2024 0007 by Luci Ramos RN  Outcome: Progressing  6/19/2024 0001 by Luci Ramos RN  Outcome: Progressing     Problem: Pain  Goal: Verbalizes/displays 
  Problem: Discharge Planning  Goal: Discharge to home or other facility with appropriate resources  6/24/2024 0952 by Zain Jenkins RN  Outcome: Progressing  6/23/2024 2135 by Vicki Baeza RN  Outcome: Progressing  Flowsheets (Taken 6/23/2024 0900 by Joyce Ellington, RN)  Discharge to home or other facility with appropriate resources:   Identify barriers to discharge with patient and caregiver   Arrange for needed discharge resources and transportation as appropriate   Identify discharge learning needs (meds, wound care, etc)   Refer to discharge planning if patient needs post-hospital services based on physician order or complex needs related to functional status, cognitive ability or social support system     Problem: Safety - Medical Restraint  Goal: Remains free of injury from restraints (Restraint for Interference with Medical Device)  Description: INTERVENTIONS:  1. Determine that other, less restrictive measures have been tried or would not be effective before applying the restraint  2. Evaluate the patient's condition at the time of restraint application  3. Inform patient/family regarding the reason for restraint  4. Q2H: Monitor safety, psychosocial status, comfort, nutrition and hydration  6/24/2024 0952 by Zain Jenkins RN  Outcome: Progressing  6/23/2024 2135 by Vicki Baeza RN  Outcome: Progressing     Problem: Safety - Adult  Goal: Free from fall injury  6/24/2024 0952 by Zain Jenkins RN  Outcome: Progressing  6/23/2024 2135 by Vicki Baeza RN  Outcome: Progressing     Problem: Skin/Tissue Integrity  Goal: Absence of new skin breakdown  Description: 1.  Monitor for areas of redness and/or skin breakdown  2.  Assess vascular access sites hourly  3.  Every 4-6 hours minimum:  Change oxygen saturation probe site  4.  Every 4-6 hours:  If on nasal continuous positive airway pressure, respiratory therapy assess nares and determine need for appliance change or resting period.  6/24/2024 0952 by 
  Problem: Discharge Planning  Goal: Discharge to home or other facility with appropriate resources  6/26/2024 1236 by Edyta Cruz RN  Outcome: Progressing   Pt to discharge home once medically cleared.   Problem: Safety - Adult  Goal: Free from fall injury  6/26/2024 1236 by Edyta Cruz RN  Outcome: Progressing  Flowsheets (Taken 6/26/2024 0939)  Free From Fall Injury: Instruct family/caregiver on patient safety   No injury noted this shift.   Problem: Skin/Tissue Integrity  Goal: Absence of new skin breakdown  Description: 1.  Monitor for areas of redness and/or skin breakdown  2.  Assess vascular access sites hourly  3.  Every 4-6 hours minimum:  Change oxygen saturation probe site  4.  Every 4-6 hours:  If on nasal continuous positive airway pressure, respiratory therapy assess nares and determine need for appliance change or resting period.  6/26/2024 1236 by Edyta Cruz RN  Outcome: Progressing   No new altered skin noted this shift.   Problem: ABCDS Injury Assessment  Goal: Absence of physical injury  6/26/2024 1236 by Edyta Cruz RN  Outcome: Progressing   No injury noted this shift.   Problem: Pain  Goal: Verbalizes/displays adequate comfort level or baseline comfort level  6/26/2024 1236 by Edyta Cruz RN  Outcome: Progressing  Flowsheets (Taken 6/26/2024 0813)  Verbalizes/displays adequate comfort level or baseline comfort level: Encourage patient to monitor pain and request assistance   Pt rated pain to back of neck at a 1. PRN voltaren gel helpful.   Problem: Nutrition Deficit:  Goal: Optimize nutritional status  6/26/2024 1236 by Edyta Cruz RN  Outcome: Progressing   Pt tolerating po intake this shift.   Problem: Chronic Conditions and Co-morbidities  Goal: Patient's chronic conditions and co-morbidity symptoms are monitored and maintained or improved  6/26/2024 1236 by Edyta Cruz RN  Outcome: Progressing   Monitoring.   
  Problem: Discharge Planning  Goal: Discharge to home or other facility with appropriate resources  6/27/2024 1302 by Angelica Andujar LPN  Outcome: Progressing    Problem: Safety - Adult  Goal: Free from fall injury  6/27/2024 1302 by Angelica Andujar LPN  Outcome: Progressing       Problem: Skin/Tissue Integrity  Goal: Absence of new skin breakdown  Description: 1.  Monitor for areas of redness and/or skin breakdown  2.  Assess vascular access sites hourly  3.  Every 4-6 hours minimum:  Change oxygen saturation probe site  4.  Every 4-6 hours:  If on nasal continuous positive airway pressure, respiratory therapy assess nares and determine need for appliance change or resting period.  6/27/2024 1302 by Angelica Andujar LPN  Outcome: Progressing     Problem: ABCDS Injury Assessment  Goal: Absence of physical injury  6/27/2024 1302 by Angelica Andujar LPN  Outcome: Progressing     Problem: Pain  Goal: Verbalizes/displays adequate comfort level or baseline comfort level  6/27/2024 1302 by Angelica Andujar LPN  Outcome: Progressing     Problem: Nutrition Deficit:  Goal: Optimize nutritional status  6/27/2024 1302 by Angelica Andujar LPN  Outcome: Progressing     Problem: Chronic Conditions and Co-morbidities  Goal: Patient's chronic conditions and co-morbidity symptoms are monitored and maintained or improved  6/27/2024 1302 by Angelica Andujar LPN  Outcome: Progressing           
  Problem: Discharge Planning  Goal: Discharge to home or other facility with appropriate resources  6/28/2024 1741 by Daysi Boateng RN  Outcome: Progressing     Problem: Safety - Adult  Goal: Free from fall injury  6/28/2024 1741 by Daysi Boateng RN  Outcome: Progressing     Problem: Skin/Tissue Integrity  Goal: Absence of new skin breakdown  Description: 1.  Monitor for areas of redness and/or skin breakdown  2.  Assess vascular access sites hourly  3.  Every 4-6 hours minimum:  Change oxygen saturation probe site  4.  Every 4-6 hours:  If on nasal continuous positive airway pressure, respiratory therapy assess nares and determine need for appliance change or resting period.  6/28/2024 1741 by Daysi Boateng RN  Outcome: Progressing     Problem: ABCDS Injury Assessment  Goal: Absence of physical injury  6/28/2024 1741 by Daysi Boateng RN  Outcome: Progressing     Problem: Pain  Goal: Verbalizes/displays adequate comfort level or baseline comfort level  6/28/2024 1741 by Daysi Boateng RN  Outcome: Progressing     Problem: Nutrition Deficit:  Goal: Optimize nutritional status  6/28/2024 1741 by Daysi Boateng RN  Outcome: Progressing     Problem: Chronic Conditions and Co-morbidities  Goal: Patient's chronic conditions and co-morbidity symptoms are monitored and maintained or improved  6/28/2024 1741 by Daysi Boateng RN  Outcome: Progressing     
  Problem: Discharge Planning  Goal: Discharge to home or other facility with appropriate resources  6/28/2024 2339 by Annabelle Fiore LPN  Outcome: Progressing     Problem: Safety - Adult  Goal: Free from fall injury  6/28/2024 2339 by Annabelle Fiore LPN  Outcome: Progressing     Problem: Skin/Tissue Integrity  Goal: Absence of new skin breakdown  Description: 1.  Monitor for areas of redness and/or skin breakdown  2.  Assess vascular access sites hourly  3.  Every 4-6 hours minimum:  Change oxygen saturation probe site  4.  Every 4-6 hours:  If on nasal continuous positive airway pressure, respiratory therapy assess nares and determine need for appliance change or resting period.  6/28/2024 2339 by Annabelle Fiore LPN  Outcome: Progressing     Problem: ABCDS Injury Assessment  Goal: Absence of physical injury  6/28/2024 2339 by Annabelle Fiore LPN  Outcome: Progressing     Problem: Pain  Goal: Verbalizes/displays adequate comfort level or baseline comfort level  6/28/2024 2339 by Annabelle Fiore LPN  Outcome: Progressing     Problem: Nutrition Deficit:  Goal: Optimize nutritional status  6/28/2024 2339 by Annabelle Fiore LPN  Outcome: Progressing     Problem: Chronic Conditions and Co-morbidities  Goal: Patient's chronic conditions and co-morbidity symptoms are monitored and maintained or improved  6/28/2024 2339 by Annabelle Fiore LPN  Outcome: Progressing     
  Problem: Discharge Planning  Goal: Discharge to home or other facility with appropriate resources  7/1/2024 1306 by Zain Jenkins RN  Outcome: Progressing     Problem: Safety - Adult  Goal: Free from fall injury  7/1/2024 1306 by Zain Jenkins RN  Outcome: Progressing     Problem: Skin/Tissue Integrity  Goal: Absence of new skin breakdown  Description: 1.  Monitor for areas of redness and/or skin breakdown  2.  Assess vascular access sites hourly  3.  Every 4-6 hours minimum:  Change oxygen saturation probe site  4.  Every 4-6 hours:  If on nasal continuous positive airway pressure, respiratory therapy assess nares and determine need for appliance change or resting period.  7/1/2024 1306 by Zain Jenkins RN  Outcome: Progressing     Problem: ABCDS Injury Assessment  Goal: Absence of physical injury  7/1/2024 1306 by Zain Jenkins RN  Outcome: Progressing     Problem: Pain  Goal: Verbalizes/displays adequate comfort level or baseline comfort level  7/1/2024 1306 by Zain Jenkins RN  Outcome: Progressing     Problem: Nutrition Deficit:  Goal: Optimize nutritional status  7/1/2024 1306 by Zain Jenkins RN  Outcome: Progressing     Problem: Chronic Conditions and Co-morbidities  Goal: Patient's chronic conditions and co-morbidity symptoms are monitored and maintained or improved  7/1/2024 1306 by Zain Jenkins RN  Outcome: Progressing     
  Problem: Discharge Planning  Goal: Discharge to home or other facility with appropriate resources  7/1/2024 2318 by eLsa Eli LPN  Outcome: Progressing     Problem: Safety - Adult  Goal: Free from fall injury  7/1/2024 2318 by Lesa Eli LPN  Outcome: Progressing     Problem: Skin/Tissue Integrity  Goal: Absence of new skin breakdown  Description: 1.  Monitor for areas of redness and/or skin breakdown  2.  Assess vascular access sites hourly  3.  Every 4-6 hours minimum:  Change oxygen saturation probe site  4.  Every 4-6 hours:  If on nasal continuous positive airway pressure, respiratory therapy assess nares and determine need for appliance change or resting period.  7/1/2024 2318 by Lesa Eli LPN  Outcome: Progressing     Problem: ABCDS Injury Assessment  Goal: Absence of physical injury  7/1/2024 2318 by Lesa Eli LPN  Outcome: Progressing     Problem: Pain  Goal: Verbalizes/displays adequate comfort level or baseline comfort level  7/1/2024 2318 by Lesa Eli LPN  Outcome: Progressing     Problem: Nutrition Deficit:  Goal: Optimize nutritional status  7/1/2024 2318 by Lesa Eli LPN  Outcome: Progressing     Problem: Chronic Conditions and Co-morbidities  Goal: Patient's chronic conditions and co-morbidity symptoms are monitored and maintained or improved  7/1/2024 2318 by Lesa Eli LPN  Outcome: Progressing     
  Problem: Discharge Planning  Goal: Discharge to home or other facility with appropriate resources  7/2/2024 1008 by Fabiola Townsend RN  Outcome: Progressing     Problem: Safety - Adult  Goal: Free from fall injury  7/2/2024 1008 by Fabiola Townsend RN  Outcome: Progressing     Problem: Skin/Tissue Integrity  Goal: Absence of new skin breakdown  Description: 1.  Monitor for areas of redness and/or skin breakdown  2.  Assess vascular access sites hourly  3.  Every 4-6 hours minimum:  Change oxygen saturation probe site  4.  Every 4-6 hours:  If on nasal continuous positive airway pressure, respiratory therapy assess nares and determine need for appliance change or resting period.  7/2/2024 1008 by Fabiola Townsend RN  Outcome: Progressing     Problem: ABCDS Injury Assessment  Goal: Absence of physical injury  7/2/2024 1008 by Fabiola Townsend RN  Outcome: Progressing     Problem: Pain  Goal: Verbalizes/displays adequate comfort level or baseline comfort level  7/2/2024 1008 by Fabiola Townsend RN  Outcome: Progressing     Problem: Nutrition Deficit:  Goal: Optimize nutritional status  7/2/2024 1008 by Fabiola Townsend RN  Outcome: Progressing     Problem: Chronic Conditions and Co-morbidities  Goal: Patient's chronic conditions and co-morbidity symptoms are monitored and maintained or improved  7/2/2024 1008 by Fabiola Townsend RN  Outcome: Progressing     
  Problem: Discharge Planning  Goal: Discharge to home or other facility with appropriate resources  Outcome: Progressing     Problem: Safety - Adult  Goal: Free from fall injury  Outcome: Progressing     Problem: Skin/Tissue Integrity  Goal: Absence of new skin breakdown  Description: 1.  Monitor for areas of redness and/or skin breakdown  2.  Assess vascular access sites hourly  3.  Every 4-6 hours minimum:  Change oxygen saturation probe site  4.  Every 4-6 hours:  If on nasal continuous positive airway pressure, respiratory therapy assess nares and determine need for appliance change or resting period.  Outcome: Progressing     Problem: ABCDS Injury Assessment  Goal: Absence of physical injury  Outcome: Progressing     Problem: Pain  Goal: Verbalizes/displays adequate comfort level or baseline comfort level  Outcome: Progressing     Problem: Nutrition Deficit:  Goal: Optimize nutritional status  Outcome: Progressing     Problem: Chronic Conditions and Co-morbidities  Goal: Patient's chronic conditions and co-morbidity symptoms are monitored and maintained or improved  Outcome: Progressing     
  Problem: Discharge Planning  Goal: Discharge to home or other facility with appropriate resources  Outcome: Progressing     Problem: Safety - Adult  Goal: Free from fall injury  Outcome: Progressing     Problem: Skin/Tissue Integrity  Goal: Absence of new skin breakdown  Description: 1.  Monitor for areas of redness and/or skin breakdown  2.  Assess vascular access sites hourly  3.  Every 4-6 hours minimum:  Change oxygen saturation probe site  4.  Every 4-6 hours:  If on nasal continuous positive airway pressure, respiratory therapy assess nares and determine need for appliance change or resting period.  Outcome: Progressing     Problem: ABCDS Injury Assessment  Goal: Absence of physical injury  Outcome: Progressing  Flowsheets (Taken 6/27/2024 1113)  Absence of Physical Injury: Implement safety measures based on patient assessment     Problem: Pain  Goal: Verbalizes/displays adequate comfort level or baseline comfort level  Outcome: Progressing  Flowsheets (Taken 6/26/2024 2355 by Stephanie Ramires, RN)  Verbalizes/displays adequate comfort level or baseline comfort level:   Encourage patient to monitor pain and request assistance   Assess pain using appropriate pain scale   Administer analgesics based on type and severity of pain and evaluate response   Implement non-pharmacological measures as appropriate and evaluate response     Problem: Nutrition Deficit:  Goal: Optimize nutritional status  Outcome: Progressing     Problem: Chronic Conditions and Co-morbidities  Goal: Patient's chronic conditions and co-morbidity symptoms are monitored and maintained or improved  Outcome: Progressing  Flowsheets (Taken 6/25/2024 2000 by Stephanie Ramires, RN)  Care Plan - Patient's Chronic Conditions and Co-Morbidity Symptoms are Monitored and Maintained or Improved: Monitor and assess patient's chronic conditions and comorbid symptoms for stability, deterioration, or improvement     
  Problem: Discharge Planning  Goal: Discharge to home or other facility with appropriate resources  Outcome: Progressing     Problem: Safety - Medical Restraint  Goal: Remains free of injury from restraints (Restraint for Interference with Medical Device)  Description: INTERVENTIONS:  1. Determine that other, less restrictive measures have been tried or would not be effective before applying the restraint  2. Evaluate the patient's condition at the time of restraint application  3. Inform patient/family regarding the reason for restraint  4. Q2H: Monitor safety, psychosocial status, comfort, nutrition and hydration  Outcome: Progressing     Problem: Safety - Adult  Goal: Free from fall injury  Outcome: Progressing     Problem: Skin/Tissue Integrity  Goal: Absence of new skin breakdown  Description: 1.  Monitor for areas of redness and/or skin breakdown    Problem: ABCDS Injury Assessment  Goal: Absence of physical injury  Outcome: Progressing     Problem: Pain  Goal: Verbalizes/displays adequate comfort level or baseline comfort level  Outcome: Progressing     Problem: Nutrition Deficit:  Goal: Optimize nutritional status  Outcome: Progressing     Problem: Chronic Conditions and Co-morbidities  Goal: Patient's chronic conditions and co-morbidity symptoms are monitored and maintained or improved  Outcome: Progressing   Outcome: Progressing     
  Problem: Discharge Planning  Goal: Discharge to home or other facility with appropriate resources  Outcome: Progressing     Problem: Safety - Medical Restraint  Goal: Remains free of injury from restraints (Restraint for Interference with Medical Device)  Description: INTERVENTIONS:  1. Determine that other, less restrictive measures have been tried or would not be effective before applying the restraint  2. Evaluate the patient's condition at the time of restraint application  3. Inform patient/family regarding the reason for restraint  4. Q2H: Monitor safety, psychosocial status, comfort, nutrition and hydration  Outcome: Progressing     Problem: Safety - Adult  Goal: Free from fall injury  Outcome: Progressing     Problem: Skin/Tissue Integrity  Goal: Absence of new skin breakdown  Description: 1.  Monitor for areas of redness and/or skin breakdown  2.  Assess vascular access sites hourly  3.  Every 4-6 hours minimum:  Change oxygen saturation probe site  4.  Every 4-6 hours:  If on nasal continuous positive airway pressure, respiratory therapy assess nares and determine need for appliance change or resting period.  Outcome: Progressing     Problem: ABCDS Injury Assessment  Goal: Absence of physical injury  Outcome: Progressing     
  Problem: Discharge Planning  Goal: Discharge to home or other facility with appropriate resources  Outcome: Progressing     Problem: Safety - Medical Restraint  Goal: Remains free of injury from restraints (Restraint for Interference with Medical Device)  Description: INTERVENTIONS:  1. Determine that other, less restrictive measures have been tried or would not be effective before applying the restraint  2. Evaluate the patient's condition at the time of restraint application  3. Inform patient/family regarding the reason for restraint  4. Q2H: Monitor safety, psychosocial status, comfort, nutrition and hydration  Outcome: Progressing     Problem: Safety - Adult  Goal: Free from fall injury  Outcome: Progressing     Problem: Skin/Tissue Integrity  Goal: Absence of new skin breakdown  Description: 1.  Monitor for areas of redness and/or skin breakdown  2.  Assess vascular access sites hourly  3.  Every 4-6 hours minimum:  Change oxygen saturation probe site  4.  Every 4-6 hours:  If on nasal continuous positive airway pressure, respiratory therapy assess nares and determine need for appliance change or resting period.  Outcome: Progressing     Problem: ABCDS Injury Assessment  Goal: Absence of physical injury  Outcome: Progressing     Problem: Pain  Goal: Verbalizes/displays adequate comfort level or baseline comfort level  Outcome: Progressing     Problem: Nutrition Deficit:  Goal: Optimize nutritional status  Outcome: Progressing     Problem: Chronic Conditions and Co-morbidities  Goal: Patient's chronic conditions and co-morbidity symptoms are monitored and maintained or improved  Outcome: Progressing     
  Problem: Discharge Planning  Goal: Discharge to home or other facility with appropriate resources  Outcome: Progressing     Problem: Safety - Medical Restraint  Goal: Remains free of injury from restraints (Restraint for Interference with Medical Device)  Description: INTERVENTIONS:  1. Determine that other, less restrictive measures have been tried or would not be effective before applying the restraint  2. Evaluate the patient's condition at the time of restraint application  3. Inform patient/family regarding the reason for restraint  4. Q2H: Monitor safety, psychosocial status, comfort, nutrition and hydration  Outcome: Progressing     Problem: Safety - Adult  Goal: Free from fall injury  Outcome: Progressing  Flowsheets (Taken 6/16/2024 1627 by Daksha Rodriguez, RN)  Free From Fall Injury:   Instruct family/caregiver on patient safety   Based on caregiver fall risk screen, instruct family/caregiver to ask for assistance with transferring infant if caregiver noted to have fall risk factors     Problem: Skin/Tissue Integrity  Goal: Absence of new skin breakdown  Description: 1.  Monitor for areas of redness and/or skin breakdown  2.  Assess vascular access sites hourly  3.  Every 4-6 hours minimum:  Change oxygen saturation probe site  4.  Every 4-6 hours:  If on nasal continuous positive airway pressure, respiratory therapy assess nares and determine need for appliance change or resting period.  Outcome: Progressing     Problem: ABCDS Injury Assessment  Goal: Absence of physical injury  Outcome: Progressing  Flowsheets (Taken 6/16/2024 8697 by Daksha Rodriguez, RN)  Absence of Physical Injury: Implement safety measures based on patient assessment     Problem: Pain  Goal: Verbalizes/displays adequate comfort level or baseline comfort level  Outcome: Progressing     Problem: Nutrition Deficit:  Goal: Optimize nutritional status  Outcome: Progressing  Flowsheets (Taken 6/16/2024 7378 by Trinity Palma RD, 
  Problem: Discharge Planning  Goal: Discharge to home or other facility with appropriate resources  Outcome: Progressing     Problem: Skin/Tissue Integrity  Goal: Absence of new skin breakdown  Description: 1.  Monitor for areas of redness and/or skin breakdown  2.  Assess vascular access sites hourly  3.  Every 4-6 hours minimum:  Change oxygen saturation probe site  4.  Every 4-6 hours:  If on nasal continuous positive airway pressure, respiratory therapy assess nares and determine need for appliance change or resting period.  6/20/2024 2333 by Luci Ramos, RN  Outcome: Progressing     Problem: ABCDS Injury Assessment  Goal: Absence of physical injury  6/20/2024 2333 by Luci Ramos, RN  Outcome: Progressing     Problem: Nutrition Deficit:  Goal: Optimize nutritional status  6/20/2024 2333 by Luci Ramos, RN  Outcome: Progressing     
  Problem: Discharge Planning  Goal: Discharge to home or other facility with appropriate resources  Outcome: Progressing     Problem: Skin/Tissue Integrity  Goal: Absence of new skin breakdown  Description: 1.  Monitor for areas of redness and/or skin breakdown  2.  Assess vascular access sites hourly  3.  Every 4-6 hours minimum:  Change oxygen saturation probe site  4.  Every 4-6 hours:  If on nasal continuous positive airway pressure, respiratory therapy assess nares and determine need for appliance change or resting period.  Outcome: Progressing     Problem: ABCDS Injury Assessment  Goal: Absence of physical injury  Outcome: Progressing     Problem: Nutrition Deficit:  Goal: Optimize nutritional status  Outcome: Progressing     
  Problem: Discharge Planning  Goal: Discharge to home or other facility with appropriate resources  Outcome: Progressing  Flowsheets (Taken 6/23/2024 0900 by Joyce Ellington, RN)  Discharge to home or other facility with appropriate resources:   Identify barriers to discharge with patient and caregiver   Arrange for needed discharge resources and transportation as appropriate   Identify discharge learning needs (meds, wound care, etc)   Refer to discharge planning if patient needs post-hospital services based on physician order or complex needs related to functional status, cognitive ability or social support system     Problem: Safety - Medical Restraint  Goal: Remains free of injury from restraints (Restraint for Interference with Medical Device)  Description: INTERVENTIONS:  1. Determine that other, less restrictive measures have been tried or would not be effective before applying the restraint  2. Evaluate the patient's condition at the time of restraint application  3. Inform patient/family regarding the reason for restraint  4. Q2H: Monitor safety, psychosocial status, comfort, nutrition and hydration  Outcome: Progressing     Problem: Safety - Adult  Goal: Free from fall injury  Outcome: Progressing     Problem: Skin/Tissue Integrity  Goal: Absence of new skin breakdown  Description: 1.  Monitor for areas of redness and/or skin breakdown  2.  Assess vascular access sites hourly  3.  Every 4-6 hours minimum:  Change oxygen saturation probe site  4.  Every 4-6 hours:  If on nasal continuous positive airway pressure, respiratory therapy assess nares and determine need for appliance change or resting period.  Outcome: Progressing     Problem: ABCDS Injury Assessment  Goal: Absence of physical injury  Outcome: Progressing     Problem: Pain  Goal: Verbalizes/displays adequate comfort level or baseline comfort level  Outcome: Progressing     Problem: Nutrition Deficit:  Goal: Optimize nutritional status  Outcome: 
  Problem: Safety - Adult  Goal: Free from fall injury  Outcome: Progressing   Free from falls throughout shift. Pt is compliant with safety measures in place. Pt seen by care team every hour with purposefully hourly rounding, bed is in the lowest position, call light and personal belongings within reach. Two side rails are up and bed alarm is on. Pt calls out appropriately.       Problem: Discharge Planning  Goal: Discharge to home or other facility with appropriate resources  Outcome: Progressing  Flowsheets (Taken 6/25/2024 2000)  Discharge to home or other facility with appropriate resources: Identify barriers to discharge with patient and caregiver     Problem: Skin/Tissue Integrity  Goal: Absence of new skin breakdown  Description: 1.  Monitor for areas of redness and/or skin breakdown  2.  Assess vascular access sites hourly  3.  Every 4-6 hours minimum:  Change oxygen saturation probe site  4.  Every 4-6 hours:  If on nasal continuous positive airway pressure, respiratory therapy assess nares and determine need for appliance change or resting period.  Outcome: Progressing     Problem: ABCDS Injury Assessment  Goal: Absence of physical injury  Outcome: Progressing     Problem: Pain  Goal: Verbalizes/displays adequate comfort level or baseline comfort level  Outcome: Progressing     Problem: Chronic Conditions and Co-morbidities  Goal: Patient's chronic conditions and co-morbidity symptoms are monitored and maintained or improved  Outcome: Progressing  Flowsheets (Taken 6/25/2024 2000)  Care Plan - Patient's Chronic Conditions and Co-Morbidity Symptoms are Monitored and Maintained or Improved: Monitor and assess patient's chronic conditions and comorbid symptoms for stability, deterioration, or improvement     
  Problem: Safety - Medical Restraint  Goal: Remains free of injury from restraints (Restraint for Interference with Medical Device)  Description: INTERVENTIONS:  1. Determine that other, less restrictive measures have been tried or would not be effective before applying the restraint  2. Evaluate the patient's condition at the time of restraint application  3. Inform patient/family regarding the reason for restraint  4. Q2H: Monitor safety, psychosocial status, comfort, nutrition and hydration  6/19/2024 0001 by Luci Ramos RN  Outcome: Progressing     Problem: Safety - Adult  Goal: Free from fall injury  6/19/2024 0001 by Luci Ramos RN  Outcome: Progressing     Problem: Skin/Tissue Integrity  Goal: Absence of new skin breakdown  Description: 1.  Monitor for areas of redness and/or skin breakdown  2.  Assess vascular access sites hourly  3.  Every 4-6 hours minimum:  Change oxygen saturation probe site  4.  Every 4-6 hours:  If on nasal continuous positive airway pressure, respiratory therapy assess nares and determine need for appliance change or resting period.  6/19/2024 0007 by Luci Ramos RN  Outcome: Progressing     Problem: ABCDS Injury Assessment  Goal: Absence of physical injury  6/19/2024 0007 by Luci Ramos RN  Outcome: Progressing     Problem: Pain  Goal: Verbalizes/displays adequate comfort level or baseline comfort level  6/19/2024 0007 by Luci Ramos RN  Outcome: Progressing     Problem: Nutrition Deficit:  Goal: Optimize nutritional status  6/19/2024 0007 by Luci Ramos RN  Outcome: Progressing     
  Problem: Safety - Medical Restraint  Goal: Remains free of injury from restraints (Restraint for Interference with Medical Device)  Description: INTERVENTIONS:  1. Determine that other, less restrictive measures have been tried or would not be effective before applying the restraint  2. Evaluate the patient's condition at the time of restraint application  3. Inform patient/family regarding the reason for restraint  4. Q2H: Monitor safety, psychosocial status, comfort, nutrition and hydration  6/24/2024 1786 by Altagracia Nuñez, RN  Outcome: Completed     
Individualized Plan of Care  [facility] Rehabilitation Unit    Rehabilitation physician: Dr. Hernandez    Admit Date: 6/14/2024     Rehabilitation Diagnosis: Meningitis [G03.9]  Encephalopathy [G93.40]     Rehabilitation impairments: self care, mobility, motor dysfunction, safety, cognitive function, and communication    Factors facilitating achievement of predicted outcomes: Family support, Motivated, Cooperative, Pleasant, Good insight into deficits, and Knowledge about rehab  Barriers to the achievement of predicted outcomes: Decreased endurance, Decreased proprioception, Upper extremity weakness, and Lower extremity weakness    Patient Goals: Improve independence with mobility, Improvement of mobility at a wheelchair level, Increase overall strength and endurance, Increase balance, Increase endurance, Increase independence with activities of daily living, Improve cognition, Increase self-awareness, Increase safety awareness, Increase community integration, Increase socialization, Functional communication with caregivers, Integrate appropriate pain management plan, Assure adequate nutritional option for discharge, Continence of bowel and bladder, and Provide appropriate patient and family education      NURSING:  Nursing goals for Kavya De Santiago while on the rehabilitation unit will include:  Continence of bowel and bladder, Adequate number of bowel movements, Urinate with no urinary retention >300ml in bladder, Complete bladder protocol with leigh removal, Maintain O2 SATs at an acceptable level during stay, Effective pain management while on the rehabilitation unit, Establish adequate pain control plan for discharge, Absence of skin breakdown while on the rehabilitation unit, Improved skin integrity via assessments including wound measurements, Avoidance of any hospital acquired infections, No signs/symptoms of infection at the wound site, Freedom from injury during hospitalization, and 
Please have patient or POA answer all MRI Screening questions in Epic. Thanks!  
LEELA Small  Outcome: Adequate for Discharge  7/3/2024 0053 by Florence Frye LPN  Outcome: Progressing     Problem: Chronic Conditions and Co-morbidities  Goal: Patient's chronic conditions and co-morbidity symptoms are monitored and maintained or improved  7/3/2024 1007 by Staci Lima RN  Outcome: Adequate for Discharge  7/3/2024 0053 by Florence Frye LPN  Outcome: Progressing  Flowsheets (Taken 7/2/2024 1955)  Care Plan - Patient's Chronic Conditions and Co-Morbidity Symptoms are Monitored and Maintained or Improved:   Monitor and assess patient's chronic conditions and comorbid symptoms for stability, deterioration, or improvement   Collaborate with multidisciplinary team to address chronic and comorbid conditions and prevent exacerbation or deterioration   Update acute care plan with appropriate goals if chronic or comorbid symptoms are exacerbated and prevent overall improvement and discharge     
RN  Outcome: Progressing     Problem: Nutrition Deficit:  Goal: Optimize nutritional status  6/18/2024 1047 by Staci Lima RN  Outcome: Progressing  6/18/2024 0235 by Vicki Baeza, RN  Outcome: Progressing     Problem: Chronic Conditions and Co-morbidities  Goal: Patient's chronic conditions and co-morbidity symptoms are monitored and maintained or improved  6/18/2024 1047 by Staci Lima RN  Outcome: Progressing  6/18/2024 0235 by Vicki Baeza, RN  Outcome: Progressing     
deterioration, or improvement     
maintained or improved  6/25/2024 1545 by Milagros Caballero, RN  Outcome: Progressing

## 2024-07-03 NOTE — CARE COORDINATION
[facility] Acute Inpatient Rehabilitation  Case Management Discharge Note    Discharge Disposition: Home with     Discharge Transportation Method: family car ,  Time: after 12pm    IMM Letter Status: Not Applicable: Patient does not have Medicare as a payor.    Home Healthcare Services: Company: [employer] infusion    Outpatient Therapy Services: Not Applicable    DME: No DME Needs Identified    Current reconciled medication list sent to subsequent provider via: Electronic Health Record      Patient Health Questionnaire-9 (PHQ-2 to 9)   Over the last 2 weeks, how often have you been bothered by any of the following problems?    1. Little Interest or pleasure in doing things?   Never or 1 Day - Score 0    2. Feeling down, depressed or hopeless?   2-6 Days (Several Days) - Score 1    3. Trouble falling or staying asleep, or sleeping too much?   Never or 1 Day - Score 0    4. Feeling tired or having little energy?   2-6 Days (Several Days) - Score 1    5. Poor appetite or overeating?   Never or 1 Day - Score 0    6. Feeling bad about yourself-or that you are a failure or have let yourself or your family down?   Never or 1 Day - Score 0    7. Trouble concentrating on things, such as reading the newspaper or watching television?    Never or 1 Day - Score 0    8. Moving or speaking so slowly that other people could have noticed? Or the opposite-being so fidgety or restless that you have been moving around a lot more than usual?  Never or 1 Day - Score 0    9. Thoughts that you would be better off dead or of hurting yourself in some way?   Never or 1 Day - Score 0    Total Score: 2  If score is above 15, Notify PM&R Physician    If you checked off any problems, how difficult have these problems made it for you to do your work, take care of things at home, or get along with other people?    Not Difficult At All       Social Isolation     How often do you feel lonely or isolated from

## 2024-07-03 NOTE — CARE COORDINATION
[facility] CASE MANAGEMENT NOTE:    Admission Date:  6/14/2024 Kavya De Santiago is a 64 y.o.  female    Admitted for : Meningitis [G03.9]  Encephalopathy [G93.40]    Patient follow up appointments for PCP, ID, ENT scheduled. Patient will follow up with Dr. Heck (ENT) at the [facility] on 7/18/24 @ 1:00 pm. Dr. Harris requested a follow up in the next couple of weeks after she sees ENT. That appointment was scheduled for 7/24 @ 9:15 am. Patient will need to keep PICC line until after her appointment with Dr. aHrris. Arranged for patient to go to [facility] for dressing changes every week as C is unable to be arranged.    Will continue to follow for additional discharge needs.    Electronically signed by Terrie Johnson RN on 7/3/2024 at 10:49 AM

## 2024-07-05 ENCOUNTER — CARE COORDINATION (OUTPATIENT)
Dept: CARE COORDINATION | Age: 64
End: 2024-07-05

## 2024-07-05 DIAGNOSIS — G93.40 ACUTE ENCEPHALOPATHY: Primary | ICD-10-CM

## 2024-07-05 NOTE — CARE COORDINATION
Call to ofc of [facility] pain mgt. Spoke with Ann, updated on patient inquiry re: okay to continue to [facility] after recent DC, also will need refill, states patient will need ofc appt to address.

## 2024-07-05 NOTE — CARE COORDINATION
Care Transitions Note    Initial Call - Call within 2 business days of discharge: Yes    Patient Current Location:  Home: [address] [facility] [address]    Care Transition Nurse contacted the patient by telephone to perform post hospital discharge assessment, verified name and  as identifiers. Provided introduction to self, and explanation of the Care Transition Nurse role.     Patient: Kavya De Santiago    Patient : 1960   MRN: [id number]    Reason for Admission: encephalopathy  Discharge Date: 7/3/24  RURS: Readmission Risk Score: 24.4      Last Discharge Facility       Date Complaint Diagnosis Description Type Department Provider    24  Hx of meningitis ... Admission (Discharged) [facility] Jose Moran MD            Was this an external facility discharge? No    Additional needs identified to be addressed with provider   Standard priority: HFU appt 24. New Fioricet denied by insurance, will need prior auth to fill. States will purchase partial supply.              Method of communication with provider: chart routing.    Patients top risk factors for readmission: medical condition-complex medical conditions, multiple health system providers, and polypharmacy    Interventions to address risk factors:   Education: check BP, adequate fluids, beta blockers  Review of patient management of conditions/medications: med rec  Communication with providers: contacted pcp, pain mgt    Care Summary Note: Patient reached for 24 h call. States doing \"pretty good\" home with IV antibiotics, spouse is assisting. Seen at [facility] infusion for PICC maintenance. Denies transportation barriers. Reviewed IP stay, HFU with PCP . Patient to schedule with neuro and GI, Has IP appt . Multiple new medications. Does not have Fioricet or lactobacillis, states pharm could not fill and will check today. Instructed to contact CTN for barriers with these. Med rec completed, denies further medication barriers. Discussed

## 2024-07-05 NOTE — CARE COORDINATION
Call back to patient updated on need to make ofc appt with Dr. Granados to address Lyrica, verbalizes understanding. States she has obtained partial script Fiorcet due to need for PA, educated will need to request from PCP. CTN will msg PCP.

## 2024-07-08 ENCOUNTER — TELEPHONE (OUTPATIENT)
Dept: GASTROENTEROLOGY | Age: 64
End: 2024-07-08

## 2024-07-08 ENCOUNTER — OFFICE VISIT (OUTPATIENT)
Dept: GASTROENTEROLOGY | Age: 64
End: 2024-07-08
Payer: MEDICAID

## 2024-07-08 ENCOUNTER — HOSPITAL ENCOUNTER (OUTPATIENT)
Age: 64
Setting detail: SPECIMEN
Discharge: HOME | End: 2024-07-08

## 2024-07-08 VITALS
OXYGEN SATURATION: 97 % | BODY MASS INDEX: 35.22 KG/M2 | HEART RATE: 63 BPM | HEIGHT: 62 IN | TEMPERATURE: 97.8 F | SYSTOLIC BLOOD PRESSURE: 136 MMHG | WEIGHT: 191.4 LBS | RESPIRATION RATE: 19 BRPM | DIASTOLIC BLOOD PRESSURE: 74 MMHG

## 2024-07-08 DIAGNOSIS — Z12.11 COLON CANCER SCREENING: ICD-10-CM

## 2024-07-08 DIAGNOSIS — Z12.11 COLON CANCER SCREENING: Primary | ICD-10-CM

## 2024-07-08 DIAGNOSIS — K76.0 HEPATIC STEATOSIS: ICD-10-CM

## 2024-07-08 DIAGNOSIS — R19.7 DIARRHEA, UNSPECIFIED TYPE: Primary | ICD-10-CM

## 2024-07-08 LAB
ALBUMIN SERPL-MCNC: 4.4 G/DL (ref 3.5–5.2)
ALBUMIN/GLOB SERPL: 1 {RATIO} (ref 1–2.5)
ALP SERPL-CCNC: 109 U/L (ref 35–104)
ALT SERPL-CCNC: 26 U/L (ref 10–35)
AST SERPL-CCNC: 20 U/L (ref 10–35)
BILIRUB DIRECT SERPL-MCNC: <0.2 MG/DL (ref 0–0.2)
BILIRUB INDIRECT SERPL-MCNC: ABNORMAL MG/DL (ref 0–1)
BILIRUB SERPL-MCNC: <0.2 MG/DL (ref 0–1.2)
ERYTHROCYTE [DISTWIDTH] IN BLOOD BY AUTOMATED COUNT: 14.7 % (ref 11.8–14.4)
GLOBULIN SER CALC-MCNC: 3.2 G/DL
HCT VFR BLD AUTO: 36.4 % (ref 36.3–47.1)
HGB BLD-MCNC: 11.1 G/DL (ref 11.9–15.1)
INR PPP: 0.9
MCH RBC QN AUTO: 27.8 PG (ref 25.2–33.5)
MCHC RBC AUTO-ENTMCNC: 30.5 G/DL (ref 28.4–34.8)
MCV RBC AUTO: 91 FL (ref 82.6–102.9)
NRBC BLD-RTO: 0 PER 100 WBC
PLATELET # BLD AUTO: 413 K/UL (ref 138–453)
PMV BLD AUTO: 10.5 FL (ref 8.1–13.5)
PROT SERPL-MCNC: 7.6 G/DL (ref 6.6–8.7)
PROTHROMBIN TIME: 11.9 SEC (ref 11.7–14.9)
RBC # BLD AUTO: 4 M/UL (ref 3.95–5.11)
WBC OTHER # BLD: 9.7 K/UL (ref 3.5–11.3)

## 2024-07-08 PROCEDURE — 3078F DIAST BP <80 MM HG: CPT | Performed by: INTERNAL MEDICINE

## 2024-07-08 PROCEDURE — 99214 OFFICE O/P EST MOD 30 MIN: CPT | Performed by: INTERNAL MEDICINE

## 2024-07-08 PROCEDURE — 3075F SYST BP GE 130 - 139MM HG: CPT | Performed by: INTERNAL MEDICINE

## 2024-07-08 PROCEDURE — G2211 COMPLEX E/M VISIT ADD ON: HCPCS | Performed by: INTERNAL MEDICINE

## 2024-07-08 RX ORDER — POLYETHYLENE GLYCOL 3350, SODIUM SULFATE ANHYDROUS, SODIUM BICARBONATE, SODIUM CHLORIDE, POTASSIUM CHLORIDE 236; 22.74; 6.74; 5.86; 2.97 G/4L; G/4L; G/4L; G/4L; G/4L
4 POWDER, FOR SOLUTION ORAL ONCE
Qty: 4000 ML | Refills: 0 | Status: SHIPPED | OUTPATIENT
Start: 2024-07-08 | End: 2024-07-08

## 2024-07-08 ASSESSMENT — ENCOUNTER SYMPTOMS
NAUSEA: 1
TROUBLE SWALLOWING: 0
ABDOMINAL DISTENTION: 1
ANAL BLEEDING: 0
SORE THROAT: 1
ABDOMINAL PAIN: 0
WHEEZING: 0
DIARRHEA: 1
RECTAL PAIN: 0
BLOOD IN STOOL: 0
CONSTIPATION: 0
VOICE CHANGE: 1
COUGH: 1
CHOKING: 0
VOMITING: 0
COLOR CHANGE: 0

## 2024-07-08 NOTE — TELEPHONE ENCOUNTER
Pt saw Anjel today in clinic. He would like pt put on schedule at [facility] next Wednesday, 07/17/24 at 8:30 am. Per Dr. Hobbs, if her infections come back positive, then we will need to cancel procedure.    Procedure scheduled/Dr Hobbs  Procedure: Colonoscopy (Diagnostic)  Dx: Diarrhea, unspecified type; Colon cancer screening  Date: Wednesday 07/17/24  Time: 8:30 am/Arrive at 7:00 am  Hospital: [facility]; Entrance C  PAT Phone Call: N/A  Bowel Prep instructions given: Mery Bowel Prep  In office/via phone: in office  Clearance needed: N/A    Pt states she takes Aspirin. Writer instructed pt to stop Aspirin 1 day prior to procedure.

## 2024-07-08 NOTE — PROGRESS NOTES
Reason for Referral:       No referring provider defined for this encounter.    Chief Complaint   Patient presents with    Follow-up     Hosp FU Colitis           HISTORY OF PRESENT ILLNESS: Ms.Cheryl FOREIGN De Santiago is a 64 y.o. female with a past history remarkable for CKD, GERD, hypertension, hyperlipidemia, type 2 diabetes, obesity, who presents after recent hospitalization.    The patient was notably admitted with acute hypoxic respiratory failure and was intubated.  Also noted to be in sepsis with blood cultures positive for haemophilus influenza.  Lumbar puncture was done that showed meningitis.  Patient also had diarrhea and imaging demonstrated pancolitis for which empiric C. difficile therapy with oral vancomycin was given.    Her LFTs were notably elevated for which a complete liver disease workup was obtained.  The autoimmune panel and acute hepatitis panel was unremarkable.  Her most recent LFTs completed on 6/30/2024 were normal.    Interval history 7/8/2024:  The patient continues to endorse diarrhea.  Reports having 6-7 loose stools daily.  This has been ongoing since the hospitalization.  She did not have diarrhea prior to the hospital stay.      Previous Endoscopies    Last colonoscopy 10 years ago  No prior EGD    Previous GI workup       Past Medical,Family, and Social History reviewed and does not contribute to the patient presentingcondition.    Patient's PMH/PSH,SH,PSYCH Hx, MEDs, ALLERGIES, and ROS were all reviewed and updated in the appropriate sections.    PAST MEDICAL HISTORY:  Past Medical History:   Diagnosis Date    Cervical spondylosis 04/2009    c-4 c-5, c-5 c-6, c-6 c-7    CKD (chronic kidney disease)     per pt, does not have nephrologist    COVID-19 12/06/2021    nasal congestion, fatique and myalgia x 3 weeks    Fall     PATIENT FELL 2 WEEKS AGO [place]    Generalized anxiety disorder 12/08/2020    GERD (gastroesophageal reflux disease)     History of recent

## 2024-07-09 ENCOUNTER — CARE COORDINATION (OUTPATIENT)
Dept: CARE COORDINATION | Age: 64
End: 2024-07-09

## 2024-07-09 NOTE — CARE COORDINATION
Care Transitions Note    Follow Up Call     Patient Current Location:  Home: [address]  [facility], [place] [address]    Care Transition Nurse contacted the patient by telephone. Verified name and  as identifiers.    Additional needs identified to be addressed with provider   No needs identified                 Method of communication with provider: none.    Care Summary Note: Patient reached for follow up call. Discussed has completed GI follow up appt, to submit stool sample, colonoscopy pending test results. Neurology follow up not until 24, states concerns this was first available, reports is on cancellation list. States concerns re: HFU out so far. Educated if has concerns related to neurological issues, may contact this office, PCP or CTN to assist with possible interventions. Reviewed ACP, states does not have, educated on [place] Advanced Directives. Educated on RPM program, inquired on cost, updated with current insurance no OOP, communicates is unsure re: continued access to health insurance, will have active SS disability next month, agrees to MSW referral. Will revisit RPM discussion at later time. Denies further questions or concerns at this time. Agrees to call back in a week.    Plan of care updates since last contact:  Education: [place]ives  Review of patient management of conditions/medications: Neuro and GI appts, patient to follow up on stool sample, possible colonoscopy  Referrals: MSW        Advance Care Planning:   Does patient have an Advance Directive: not on file; education provided   Medication Review:  Full medication reconciliation completed during previous call.    Remote Patient Monitoring:  Offered patient enrollment in the Remote Patient Monitoring (RPM) program for in-home monitoring: Yes, but did not enroll at this time: will review if appropriate at later time .    Assessments:  Care Transitions Subsequent and Final Call    Schedule Follow Up Appointment with PCP:

## 2024-07-10 ENCOUNTER — TELEPHONE (OUTPATIENT)
Dept: FAMILY MEDICINE CLINIC | Age: 64
End: 2024-07-10

## 2024-07-10 ENCOUNTER — HOSPITAL ENCOUNTER (OUTPATIENT)
Age: 64
Setting detail: SPECIMEN
Discharge: HOME | End: 2024-07-10
Payer: MEDICAID

## 2024-07-10 ENCOUNTER — HOSPITAL ENCOUNTER (OUTPATIENT)
Dept: INFUSION THERAPY | Age: 64
Setting detail: INFUSION SERIES
Discharge: HOME | End: 2024-07-10
Payer: MEDICAID

## 2024-07-10 ENCOUNTER — CARE COORDINATION (OUTPATIENT)
Dept: CARE COORDINATION | Age: 64
End: 2024-07-10

## 2024-07-10 ENCOUNTER — HOSPITAL ENCOUNTER (OUTPATIENT)
Dept: ULTRASOUND IMAGING | Age: 64
Discharge: HOME | End: 2024-07-12
Payer: MEDICAID

## 2024-07-10 DIAGNOSIS — K76.0 HEPATIC STEATOSIS: ICD-10-CM

## 2024-07-10 DIAGNOSIS — R19.7 DIARRHEA, UNSPECIFIED TYPE: ICD-10-CM

## 2024-07-10 LAB
C DIFF GDH + TOXINS A+B STL QL IA.RAPID: NEGATIVE
SPECIMEN DESCRIPTION: NORMAL

## 2024-07-10 PROCEDURE — 76981 USE PARENCHYMA: CPT

## 2024-07-10 PROCEDURE — 87324 CLOSTRIDIUM AG IA: CPT

## 2024-07-10 PROCEDURE — 76705 ECHO EXAM OF ABDOMEN: CPT

## 2024-07-10 PROCEDURE — 99211 OFF/OP EST MAY X REQ PHY/QHP: CPT

## 2024-07-10 PROCEDURE — 83993 ASSAY FOR CALPROTECTIN FECAL: CPT

## 2024-07-10 PROCEDURE — 87449 NOS EACH ORGANISM AG IA: CPT

## 2024-07-10 PROCEDURE — 87506 IADNA-DNA/RNA PROBE TQ 6-11: CPT

## 2024-07-10 NOTE — PROGRESS NOTES
Pt seen for PICC dressing change. Small blisters noted around edge of dressing, left open to air. Dressing not well sealed upon pt arrival. Site cleaned and new dressing applied.

## 2024-07-10 NOTE — CARE COORDINATION
Writer called Patient to do a Social service call for her to introduce myself and to gauge her needs in regards to the Referral that was placed by her PCP Office.    Patient reported that she would be losing her Insurance at the end of the Month as she will be getting Social Security Disability at that time.    Writer inquired about how much it would be to see if she would still qualify for MEDICAID and she would be over the income guideline.    Writer told her to apply for (QMB) QUALIFIED MEDICAID BENEFIT to see if she could access that benefit; Patient said she would do so.    Writer and Patient agreed to talk on next week or so.

## 2024-07-10 NOTE — TELEPHONE ENCOUNTER
Medication Management Service ([facility])  [facility]  [phone]     CLINICAL PHARMACY NOTE:    Message received from patient.  Patient scheduled for post hospital follow on 07/12/2024@10:45 (Dr Roe).  Patient is concerned regarding the changes made to medications while in the hospital and was hoping to schedule an appointment with writer for review of medications.      At this time, writer's schedule is full around the time of patient's PCP appointment and would not be able to add patient to writer's schedule on that day.      Phone call to patient to see if an appointment could be scheduled for another day.  No answer.  Unable to LVM due to full mailbox.     Patricia March, Pharm.D., BCACP  Clinical Pharmacist  [facility] Medication Management Service  [phone]  7/10/2024  4:49 PM      ===============================================  For Pharmacy Admin Tracking Only    Program: Medical Group  Time Spent (min): 10

## 2024-07-11 LAB
CAMPYLOBACTER DNA SPEC NAA+PROBE: NORMAL
ETEC ELTA+ESTB GENES STL QL NAA+PROBE: NORMAL
P SHIGELLOIDES DNA STL QL NAA+PROBE: NORMAL
SALMONELLA DNA SPEC QL NAA+PROBE: NORMAL
SHIGA TOXIN STX GENE SPEC NAA+PROBE: NORMAL
SHIGELLA DNA SPEC QL NAA+PROBE: NORMAL
SPECIMEN DESCRIPTION: NORMAL
V CHOL+PARA RFBL+TRKH+TNAA STL QL NAA+PR: NORMAL
Y ENTERO RECN STL QL NAA+PROBE: NORMAL

## 2024-07-12 ENCOUNTER — OFFICE VISIT (OUTPATIENT)
Dept: FAMILY MEDICINE CLINIC | Age: 64
End: 2024-07-12

## 2024-07-12 VITALS
HEART RATE: 68 BPM | BODY MASS INDEX: 35.88 KG/M2 | DIASTOLIC BLOOD PRESSURE: 81 MMHG | HEIGHT: 62 IN | SYSTOLIC BLOOD PRESSURE: 136 MMHG | WEIGHT: 195 LBS

## 2024-07-12 DIAGNOSIS — H70.92 MASTOIDITIS OF LEFT SIDE: Primary | ICD-10-CM

## 2024-07-12 DIAGNOSIS — R05.1 ACUTE COUGH: ICD-10-CM

## 2024-07-12 DIAGNOSIS — Z09 HOSPITAL DISCHARGE FOLLOW-UP: ICD-10-CM

## 2024-07-12 PROBLEM — I07.9 TRICUSPID VALVE DISORDER: Status: ACTIVE | Noted: 2023-07-18

## 2024-07-12 PROBLEM — G56.03 BILATERAL CARPAL TUNNEL SYNDROME: Status: RESOLVED | Noted: 2022-08-01 | Resolved: 2024-07-12

## 2024-07-12 LAB
ALANINE AMINOTRANSFERASE, FIBROMETER: NORMAL
ALPHA-2-MACROGLOBULIN, FIBROMETER: NORMAL
ASPARTATE AMINOTRANSFERASE, FIBROMETER: NORMAL
CALPROTECTIN, FECAL: 48 UG/G
CIRRHOMETER PATIENT SCORE: NORMAL
EER FIBROMETER REPORT: NORMAL
FIBROMETER INTERPRETATION: NORMAL
FIBROMETER PATIENT SCORE: NORMAL
FIBROMETER PLATELET COUNT: 149
FIBROMETER PROTHROMBIN INDEX: NORMAL
FIBROSIS METAVIR CLASSIFICATION: NORMAL
GAMMA GLUTAMYL TRANSFERASE, FIBROMETER: NORMAL
INFLAMETER METAVIR CLASSIFICATION: NORMAL
INFLAMETER PATIENT SCORE: NORMAL
UREA NITROGEN, FIBROMETER: NORMAL

## 2024-07-12 RX ORDER — BENZONATATE 200 MG/1
200 CAPSULE ORAL 3 TIMES DAILY PRN
Qty: 22 CAPSULE | Refills: 0 | Status: SHIPPED | OUTPATIENT
Start: 2024-07-12 | End: 2024-07-19

## 2024-07-12 ASSESSMENT — ENCOUNTER SYMPTOMS
COUGH: 1
DIARRHEA: 1
ABDOMINAL PAIN: 0
BACK PAIN: 0
TROUBLE SWALLOWING: 0
CONSTIPATION: 0
VOICE CHANGE: 0
SHORTNESS OF BREATH: 0
SORE THROAT: 1
CHEST TIGHTNESS: 0

## 2024-07-12 NOTE — PROGRESS NOTES
Attending Physician Statement  I have discussed the care of Kavya De Santiago, including pertinent history and exam findings,  with the resident. I have seen and examined the patient and the key elements of all parts of the encounter have been performed by me.  I agree with the assessment, plan and orders as documented by the resident.  (GC Modifier)    Maximiliano Gómez MD  
Visit Information    Have you changed or started any medications since your last visit including any over-the-counter medicines, vitamins, or herbal medicines? no   Have you stopped taking any of your medications? Is so, why? -  no  Are you having any side effects from any of your medications? - no    Have you seen any other physician or provider since your last visit?  yes - Gastro 7/8/24   Have you had any other diagnostic tests since your last visit?  yes - Liver US and Labs 7/10/24   Have you been seen in the emergency room and/or had an admission in a hospital since we last saw you?  yes - [facility] ED 6/14/24 to 7/3/24 for Meningitis and Mastoiditis   Have you had your routine dental cleaning in the past 6 months?  no     Do you have an active MyChart account? If no, what is the barrier?  Yes    Patient Care Team:  Shaina Hamilton MD as PCP - General (Family Medicine)  Shaina Hamilton MD as PCP - Empaneled Provider  Lucas Huitron MD as Consulting Physician (Gastroenterology)  Haris Granados MD Escobar, Alexander, MD as Orthopedic Surgeon  Patricia March [facility] as Pharmacist (Pharmacist)  Justyna Serna [facility] as Pharmacist (Pharmacy)  Bonita Armenta RN as Registered Nurse  Junior Mallory, MSW, LSW as  (Licensed Clinical )    Medical History Review  Past Medical, Family, and Social History reviewed and does contribute to the patient presenting condition    Health Maintenance   Topic Date Due    Shingles vaccine (1 of 2) Never done    Diabetic retinal exam  01/12/2017    Diabetic foot exam  12/01/2017    Respiratory Syncytial Virus (RSV) Pregnant or age 60 yrs+ (1 - 1-dose 60+ series) Never done    COVID-19 Vaccine (3 - 2023-24 season) 09/01/2023    Diabetic Alb to Cr ratio (uACR) test  09/22/2023    Flu vaccine (1) 08/01/2024    A1C test (Diabetic or Prediabetic)  09/05/2024    Breast cancer screen  04/25/2025    Depression Monitoring  04/30/2025    Lipids  06/05/2025    GFR test 
  Mood and Affect: Mood normal.         Behavior: Behavior normal.           Assessment and Plan:    1. Hospital discharge follow-up  -Recent admission due to sepsis secondary to mastoiditis and meningitis  -Prolonged hospitalization and acute rehab    2. Acute cough  -Associated with sore throat and runny nose, likely URI  -Afebrile, normal O2 sat  -Need to rule out pneumonia s/p hospitalization  - XR CHEST STANDARD (2 VW); Future  - benzonatate (TESSALON) 200 MG capsule; Take 1 capsule by mouth 3 times daily as needed for Cough  Dispense: 22 capsule; Refill: 0    3. Mastoiditis of left side  -Complicated meningitis and sepsis, currently improving  -Will follow-up with ENT to [facility]  -Follow-up with infectious disease      Requested Prescriptions     Signed Prescriptions Disp Refills    benzonatate (TESSALON) 200 MG capsule 22 capsule 0     Sig: Take 1 capsule by mouth 3 times daily as needed for Cough       Medications Discontinued During This Encounter   Medication Reason    ondansetron (ZOFRAN) 4 MG tablet LIST CLEANUP    docusate sodium (COLACE) 100 MG capsule LIST CLEANUP    Blood Glucose Monitoring Suppl (PRODIGY AUTOCODE BLOOD GLUCOSE) w/Device KIT LIST CLEANUP    Elastic Bandages & Supports (ELBOW BRACE) MISC LIST CLEANUP    diclofenac sodium (VOLTAREN) 1 % GEL LIST CLEANUP    latanoprost (XALATAN) 0.005 % ophthalmic solution LIST CLEANUP       Kavya received counseling on the following healthy behaviors: nutrition, exercise and medication adherence.    Discussed use, benefit, and side effects of prescribed medications.  Barriers to medication compliance addressed.        All patient questions answered.  Pt voiced understanding.     Following up with PCP in 3 weeks      Cristobal Roe MD  Family medicine resident, PGY3  7/12/2024 at 12:10 PM       Disclaimer: Some orall of this note was transcribed using voice-recognition software.This may cause typographical errors occasionally. Although all effort is made

## 2024-07-12 NOTE — TELEPHONE ENCOUNTER
Writer reviewed OV Note DOS 07/08/24 with Dr. Hobbs, he said:     -Will obtain ultrasound liver, elastography and fibrosis panel  -Repeat LFTs.  -Will obtain C. difficile, GI panel and calprotectin  -If above negative for infection, will plan for colonoscopy    Writer saw labs from 07/10/24 were completed and resulted, however Calprotectin is still active-in process.    Dr. Hobbs, please advise.

## 2024-07-12 NOTE — TELEPHONE ENCOUNTER
Patient left message for a call back.  Stated that she had to have testing prior to procedure.  Asking if she really needs the procedure.  Please follow up.  Thank you.

## 2024-07-13 NOTE — DISCHARGE INSTRUCTIONS
Normal changes you may experience after a colonoscopy:  Passing of gas for several hours after  Some mild abdominal cramping  If a biopsy/ polypectomy was done, you may see some spotting of blood on the tissue when wiping  You may feel fatigued for the next 24-48 hours due to the preparation, sedation and procedure    Activity   You have had anesthesia today  Do not drive, operate heavy equipment, consume alcoholic beverages, or make any important decisions  for 24 hours   Take your time changing positions today. You may feel light headed or dizzy if you move too quickly.   Rest for the next 24 hours.   Diet   You can eat your normal diet when you feel well. You should start off with bland foods like chicken soup, toast, or yogurt. Then advance as tolerated.  Drink plenty of fluids (unless your doctor tells you not to). Your urine should be very lightly colored without a strong odor.    Medicines   Continue your home medications as ordered by your physician.     Call your doctor now or seek immediate medical care if:  [phone]  You are passing blood rectally or vomiting blood (color of blood may be red or black)  Severe abdominal pain or tenderness (that is not relieved by passing air)   You have a fever, chills or excessive sweating   You have persistent nausea or vomiting   Redness or swelling at the IV site

## 2024-07-15 ENCOUNTER — TELEPHONE (OUTPATIENT)
Dept: FAMILY MEDICINE CLINIC | Age: 64
End: 2024-07-15

## 2024-07-15 LAB
ALANINE AMINOTRANSFERASE, FIBROMETER: 25 U/L (ref 5–40)
ALPHA-2-MACROGLOBULIN, FIBROMETER: 176 MG/DL (ref 131–293)
ASPARTATE AMINOTRANSFERASE, FIBROMETER: 23 U/L (ref 9–40)
CIRRHOMETER PATIENT SCORE: 0.01
EER FIBROMETER REPORT: ABNORMAL
FIBROMETER INTERPRETATION: ABNORMAL
FIBROMETER PATIENT SCORE: 0.19
FIBROMETER PLATELET COUNT: 149
FIBROMETER PROTHROMBIN INDEX: 121 % (ref 90–120)
FIBROSIS METAVIR CLASSIFICATION: ABNORMAL
GAMMA GLUTAMYL TRANSFERASE, FIBROMETER: 92 U/L (ref 7–33)
INFLAMETER METAVIR CLASSIFICATION: ABNORMAL
INFLAMETER PATIENT SCORE: 0.2
UREA NITROGEN, FIBROMETER: 18 MG/DL (ref 7–20)

## 2024-07-15 NOTE — PROGRESS NOTES
PAT phone call for colonoscopy completed with pt.    Date/time/location (entrance C) of surgery/procedure verified with pt.    NPO after MN status verified with pt.    Need for  (  x )  verified with pt.    Verified need to complete bowel prep/instructions per      Instructed pt to take a shower the AM of surgery/procedure and to avoid lotions, creams, jewelry.    Instructed pt to take BP meds , Depakote, Omeprazole with a small sips of water prior to procedure/surgery. Stop Aspirin as directed by Dr. Hobbs.

## 2024-07-15 NOTE — TELEPHONE ENCOUNTER
Medication Management Service ([facility])  [facility] [address]  [phone]     CLINICAL PHARMACY NOTE:      Spoke with patient after PCP appointment on Friday, 07/12/2024.  Patient had concerns regarding blood glucose readings.  Patient is wanting to restart Trulicity but will be having a change in insurance at the end of the month.  Pt reports that has been approved for disability but is uncertain if she will need to go to the market place for insurance coverage or she may be eligible for medication coverage through Medicare?    Discussion around cost of the medication and concern about re-starting for a month and not being able to continue with it once insurance changes?  Or perhaps she will be able to continue if she will have prescription coverage through Medicare.      Libreview report reviewed and noticing glucose readings above goal. TIR-15%.  Continues with glipizide 5mg twice daily and metformin 1000mg twice daily.  Mentioned how it might be necessary to consider starting some basal insulin until insurance is identified once medicaid is ended.          Progress note routed to PCP for review and consideration of starting basal insulin.  Patient will follow up with Rose, MSW, LSW regarding her coverage.      Patricia March, Pharm.D., BCACP  Clinical Pharmacist  [facility] Medication Management Service  [phone]  7/15/2024  6:39 PM        =================================================  For Pharmacy Admin Tracking Only    Program: Medical Group  Time Spent (min): 30

## 2024-07-15 NOTE — TELEPHONE ENCOUNTER
Writer received paperwork from [employer] requesting office notes from 5/1/24 to 7/12/24. Writer printed out office notes and faxed forms back to [employer].

## 2024-07-16 ENCOUNTER — CARE COORDINATION (OUTPATIENT)
Dept: CARE COORDINATION | Age: 64
End: 2024-07-16

## 2024-07-16 ENCOUNTER — HOSPITAL ENCOUNTER (OUTPATIENT)
Dept: GENERAL RADIOLOGY | Age: 64
Discharge: HOME | End: 2024-07-18
Payer: MEDICAID

## 2024-07-16 ENCOUNTER — ANESTHESIA EVENT (OUTPATIENT)
Dept: OPERATING ROOM | Age: 64
End: 2024-07-16
Payer: MEDICAID

## 2024-07-16 ENCOUNTER — HOSPITAL ENCOUNTER (OUTPATIENT)
Age: 64
Discharge: HOME | End: 2024-07-18
Payer: MEDICAID

## 2024-07-16 DIAGNOSIS — R05.1 ACUTE COUGH: ICD-10-CM

## 2024-07-16 PROCEDURE — 71046 X-RAY EXAM CHEST 2 VIEWS: CPT

## 2024-07-16 NOTE — CARE COORDINATION
Care Transitions Note    Follow Up Call     Attempted to reach patient for transitions of care follow up.  Unable to reach patient.      Outreach Attempts:   Spoke with patient, she is unable to take call. States will try to call back, discussed CTN will call back Thursday if unable to connect today.    Follow Up Appointment:   Future Appointments         Provider Specialty Dept Phone    7/18/2024 8:30 AM [facility] OP INFUSION CHAIR 01 Infusion Therapy [phone]    7/24/2024 8:30 AM [facility] OP INFUSION CHAIR 01 Infusion Therapy [phone]    7/24/2024 9:15 AM Joel Harris MD Infectious Diseases [phone]    8/6/2024 10:30 AM Shaina Hamilton MD Family Medicine [phone]    11/7/2024 9:20 AM Annel Cavazos MD Neurology [phone]            Plan for follow-up call in 2-5 days based on severity of symptoms and risk factors. Plan for next call:  review follow up: GI appt- scope, MSW consult, continued symptoms, pharm consult    Bonita Armenta RN

## 2024-07-17 ENCOUNTER — ANESTHESIA (OUTPATIENT)
Dept: OPERATING ROOM | Age: 64
End: 2024-07-17
Payer: MEDICAID

## 2024-07-17 ENCOUNTER — HOSPITAL ENCOUNTER (OUTPATIENT)
Age: 64
Setting detail: OUTPATIENT SURGERY
Discharge: HOME | End: 2024-07-17
Attending: INTERNAL MEDICINE | Admitting: INTERNAL MEDICINE
Payer: MEDICAID

## 2024-07-17 VITALS
WEIGHT: 192.8 LBS | RESPIRATION RATE: 14 BRPM | BODY MASS INDEX: 34.16 KG/M2 | OXYGEN SATURATION: 95 % | TEMPERATURE: 97.1 F | SYSTOLIC BLOOD PRESSURE: 114 MMHG | HEIGHT: 63 IN | HEART RATE: 61 BPM | DIASTOLIC BLOOD PRESSURE: 64 MMHG

## 2024-07-17 DIAGNOSIS — Z12.11 SCREENING FOR COLON CANCER: ICD-10-CM

## 2024-07-17 DIAGNOSIS — R19.7 DIARRHEA, UNSPECIFIED TYPE: ICD-10-CM

## 2024-07-17 LAB
GLUCOSE BLD-MCNC: 232 MG/DL (ref 65–105)
GLUCOSE BLD-MCNC: 252 MG/DL (ref 65–105)

## 2024-07-17 PROCEDURE — 7100000010 HC PHASE II RECOVERY - FIRST 15 MIN: Performed by: INTERNAL MEDICINE

## 2024-07-17 PROCEDURE — 3700000001 HC ADD 15 MINUTES (ANESTHESIA): Performed by: INTERNAL MEDICINE

## 2024-07-17 PROCEDURE — 3700000000 HC ANESTHESIA ATTENDED CARE: Performed by: INTERNAL MEDICINE

## 2024-07-17 PROCEDURE — 6360000002 HC RX W HCPCS: Performed by: NURSE ANESTHETIST, CERTIFIED REGISTERED

## 2024-07-17 PROCEDURE — 3609027000 HC COLONOSCOPY: Performed by: INTERNAL MEDICINE

## 2024-07-17 PROCEDURE — 6370000000 HC RX 637 (ALT 250 FOR IP)

## 2024-07-17 PROCEDURE — 2500000003 HC RX 250 WO HCPCS: Performed by: NURSE ANESTHETIST, CERTIFIED REGISTERED

## 2024-07-17 PROCEDURE — 88305 TISSUE EXAM BY PATHOLOGIST: CPT

## 2024-07-17 PROCEDURE — 45380 COLONOSCOPY AND BIOPSY: CPT | Performed by: INTERNAL MEDICINE

## 2024-07-17 PROCEDURE — 7100000011 HC PHASE II RECOVERY - ADDTL 15 MIN: Performed by: INTERNAL MEDICINE

## 2024-07-17 PROCEDURE — 82947 ASSAY GLUCOSE BLOOD QUANT: CPT

## 2024-07-17 PROCEDURE — 2709999900 HC NON-CHARGEABLE SUPPLY: Performed by: INTERNAL MEDICINE

## 2024-07-17 RX ORDER — DIPHENHYDRAMINE HYDROCHLORIDE 50 MG/ML
12.5 INJECTION INTRAMUSCULAR; INTRAVENOUS
Status: DISCONTINUED | OUTPATIENT
Start: 2024-07-17 | End: 2024-07-17 | Stop reason: HOSPADM

## 2024-07-17 RX ORDER — IPRATROPIUM BROMIDE AND ALBUTEROL SULFATE 2.5; .5 MG/3ML; MG/3ML
1 SOLUTION RESPIRATORY (INHALATION)
Status: DISCONTINUED | OUTPATIENT
Start: 2024-07-17 | End: 2024-07-17 | Stop reason: HOSPADM

## 2024-07-17 RX ORDER — MIDAZOLAM HYDROCHLORIDE 2 MG/2ML
2 INJECTION, SOLUTION INTRAMUSCULAR; INTRAVENOUS
Status: DISCONTINUED | OUTPATIENT
Start: 2024-07-17 | End: 2024-07-17 | Stop reason: HOSPADM

## 2024-07-17 RX ORDER — SODIUM CHLORIDE 0.9 % (FLUSH) 0.9 %
5-40 SYRINGE (ML) INJECTION EVERY 12 HOURS SCHEDULED
Status: DISCONTINUED | OUTPATIENT
Start: 2024-07-17 | End: 2024-07-17 | Stop reason: HOSPADM

## 2024-07-17 RX ORDER — PROPOFOL 10 MG/ML
INJECTION, EMULSION INTRAVENOUS CONTINUOUS PRN
Status: DISCONTINUED | OUTPATIENT
Start: 2024-07-17 | End: 2024-07-17 | Stop reason: SDUPTHER

## 2024-07-17 RX ORDER — LIDOCAINE HYDROCHLORIDE 10 MG/ML
INJECTION, SOLUTION INFILTRATION; PERINEURAL PRN
Status: DISCONTINUED | OUTPATIENT
Start: 2024-07-17 | End: 2024-07-17 | Stop reason: SDUPTHER

## 2024-07-17 RX ORDER — SODIUM CHLORIDE 9 MG/ML
INJECTION, SOLUTION INTRAVENOUS PRN
Status: DISCONTINUED | OUTPATIENT
Start: 2024-07-17 | End: 2024-07-17 | Stop reason: HOSPADM

## 2024-07-17 RX ORDER — SODIUM CHLORIDE, SODIUM LACTATE, POTASSIUM CHLORIDE, CALCIUM CHLORIDE 600; 310; 30; 20 MG/100ML; MG/100ML; MG/100ML; MG/100ML
INJECTION, SOLUTION INTRAVENOUS CONTINUOUS
Status: DISCONTINUED | OUTPATIENT
Start: 2024-07-17 | End: 2024-07-17 | Stop reason: HOSPADM

## 2024-07-17 RX ORDER — NALOXONE HYDROCHLORIDE 0.4 MG/ML
INJECTION, SOLUTION INTRAMUSCULAR; INTRAVENOUS; SUBCUTANEOUS PRN
Status: DISCONTINUED | OUTPATIENT
Start: 2024-07-17 | End: 2024-07-17 | Stop reason: HOSPADM

## 2024-07-17 RX ORDER — PROPOFOL 10 MG/ML
INJECTION, EMULSION INTRAVENOUS PRN
Status: DISCONTINUED | OUTPATIENT
Start: 2024-07-17 | End: 2024-07-17 | Stop reason: SDUPTHER

## 2024-07-17 RX ORDER — SODIUM CHLORIDE 0.9 % (FLUSH) 0.9 %
5-40 SYRINGE (ML) INJECTION PRN
Status: DISCONTINUED | OUTPATIENT
Start: 2024-07-17 | End: 2024-07-17 | Stop reason: HOSPADM

## 2024-07-17 RX ORDER — OXYCODONE HYDROCHLORIDE AND ACETAMINOPHEN 5; 325 MG/1; MG/1
1 TABLET ORAL
Status: DISCONTINUED | OUTPATIENT
Start: 2024-07-17 | End: 2024-07-17 | Stop reason: HOSPADM

## 2024-07-17 RX ORDER — HYDRALAZINE HYDROCHLORIDE 20 MG/ML
10 INJECTION INTRAMUSCULAR; INTRAVENOUS
Status: DISCONTINUED | OUTPATIENT
Start: 2024-07-17 | End: 2024-07-17 | Stop reason: HOSPADM

## 2024-07-17 RX ORDER — MORPHINE SULFATE 2 MG/ML
2 INJECTION, SOLUTION INTRAMUSCULAR; INTRAVENOUS EVERY 5 MIN PRN
Status: DISCONTINUED | OUTPATIENT
Start: 2024-07-17 | End: 2024-07-17 | Stop reason: HOSPADM

## 2024-07-17 RX ORDER — LABETALOL HYDROCHLORIDE 5 MG/ML
10 INJECTION, SOLUTION INTRAVENOUS
Status: DISCONTINUED | OUTPATIENT
Start: 2024-07-17 | End: 2024-07-17 | Stop reason: HOSPADM

## 2024-07-17 RX ORDER — MEPERIDINE HYDROCHLORIDE 50 MG/ML
12.5 INJECTION INTRAMUSCULAR; INTRAVENOUS; SUBCUTANEOUS EVERY 5 MIN PRN
Status: DISCONTINUED | OUTPATIENT
Start: 2024-07-17 | End: 2024-07-17 | Stop reason: HOSPADM

## 2024-07-17 RX ORDER — ONDANSETRON 2 MG/ML
4 INJECTION INTRAMUSCULAR; INTRAVENOUS
Status: DISCONTINUED | OUTPATIENT
Start: 2024-07-17 | End: 2024-07-17 | Stop reason: HOSPADM

## 2024-07-17 RX ORDER — LIDOCAINE HYDROCHLORIDE 10 MG/ML
1 INJECTION, SOLUTION EPIDURAL; INFILTRATION; INTRACAUDAL; PERINEURAL
Status: DISCONTINUED | OUTPATIENT
Start: 2024-07-17 | End: 2024-07-17 | Stop reason: HOSPADM

## 2024-07-17 RX ORDER — GLYCOPYRROLATE 0.2 MG/ML
0.4 INJECTION INTRAMUSCULAR; INTRAVENOUS ONCE
Status: DISCONTINUED | OUTPATIENT
Start: 2024-07-17 | End: 2024-07-17 | Stop reason: HOSPADM

## 2024-07-17 RX ORDER — OXYCODONE HYDROCHLORIDE AND ACETAMINOPHEN 5; 325 MG/1; MG/1
2 TABLET ORAL
Status: DISCONTINUED | OUTPATIENT
Start: 2024-07-17 | End: 2024-07-17 | Stop reason: HOSPADM

## 2024-07-17 RX ORDER — METOCLOPRAMIDE HYDROCHLORIDE 5 MG/ML
10 INJECTION INTRAMUSCULAR; INTRAVENOUS
Status: DISCONTINUED | OUTPATIENT
Start: 2024-07-17 | End: 2024-07-17 | Stop reason: HOSPADM

## 2024-07-17 RX ADMIN — LIDOCAINE HYDROCHLORIDE 40 MG: 10 INJECTION, SOLUTION INFILTRATION; PERINEURAL at 08:33

## 2024-07-17 RX ADMIN — PROPOFOL 50 MG: 10 INJECTION, EMULSION INTRAVENOUS at 08:33

## 2024-07-17 RX ADMIN — PROPOFOL 140 MCG/KG/MIN: 10 INJECTION, EMULSION INTRAVENOUS at 08:33

## 2024-07-17 RX ADMIN — INSULIN HUMAN 4 UNITS: 100 INJECTION, SOLUTION PARENTERAL at 08:16

## 2024-07-17 ASSESSMENT — PAIN - FUNCTIONAL ASSESSMENT
PAIN_FUNCTIONAL_ASSESSMENT: NONE - DENIES PAIN
PAIN_FUNCTIONAL_ASSESSMENT: 0-10

## 2024-07-17 NOTE — ANESTHESIA PRE PROCEDURE
Date/Time    COVID19 Not Detected 06/06/2024 03:19 PM           Anesthesia Evaluation  Patient summary reviewed and Nursing notes reviewed  Airway: Mallampati: II  TM distance: >3 FB   Neck ROM: full  Mouth opening: > = 3 FB   Dental: normal exam         Pulmonary:Negative Pulmonary ROS and normal exam                               Cardiovascular:    (+) hypertension:      ECG reviewed      Echocardiogram reviewed  Stress test reviewed             ROS comment: -EKG - SR @ 60  -STRESS TEST 2024 - NO ISCHEMIA  -ECHO 2024 - EF-55%     Neuro/Psych:   (+) TIA             ROS comment: -TIA - 5/2024 GI/Hepatic/Renal:   (+) GERD: well controlled, morbid obesity          Endo/Other:    (+) Diabetes.                  ROS comment: -NPO AFTER MIDNIGHT  -ALLERGIES - CODEINE Abdominal: normal exam            Vascular: negative vascular ROS.         Other Findings:       Anesthesia Plan      MAC     ASA 3       Induction: intravenous.    MIPS: Postoperative opioids intended and Prophylactic antiemetics administered.  Anesthetic plan and risks discussed with patient.      Plan discussed with CRNA.    Attending anesthesiologist reviewed and agrees with Preprocedure content            STEVE NOLASCO MD   7/17/2024

## 2024-07-17 NOTE — OP NOTE
Colonoscopy report    Colonoscopy Procedure Note    Procedure:  Colonoscopy with biopsies    Procedure Date: 7/17/2024    Indications: Diarrhea, colon cancer screening    Sedation:  MAC    Attending Physician:  Dr. Jolynn Hobbs MD.     Assistant Physician: None    Consent:   Informed consent was obtained for the procedure after explaining the risks including bleeding, perforation, aspiration, arrhythmia, risks related to sedation, reaction to medications and rarely death, benefits and alternatives to the patient. The patient verbalized understanding and agreed to proceed with the procedure.       Procedure Details:  The patient was placed in the left lateral decubitus position.  Oxygen and cardiac monitoring equipment was attached. The patient's vital signs were monitored continuously  throughout the procedure. After appropriate sedation was achieved, a rectal examination was performed.  The pediatric colonoscope was inserted into the rectum and advanced under direct vision to the terminal ileum.  The quality of the colonic preparation was poor.  A careful inspection was made as the colonoscope was withdrawn, including a retroflexed view of the rectum; findings and interventions are described below.  Appropriate photodocumentation was obtained.           Complications:  None    Estimated blood loss:  Minimal           Disposition:  Home           Condition: stable    Withdrawal Time: 9 minutes    Findings:   The bowel prep was poor.  The terminal ileum appeared unremarkable.  No significant inflammation noted throughout the colonoscopy, random colon biopsies obtained.  No significant masses or polyps noted, however the prep was poor and the endoscopic examination was limited.  Retroflexion examination in the rectum with small internal hemorrhoids.    Specimen Removed: Random colon biopsies    Endoscopic Impression:    Poor bowel prep.  No inflammation throughout colon, random biopsies obtained.  Small internal  hemorrhoids    Recommendations:  Follow pathology results.  Repeat colonoscopy for screening in 6 to 12 months with a 2-day prep.  Consider using adult scope for the next colonoscopy.    Attending Attestation: I performed the procedure.    Jolynn Hobbs MD

## 2024-07-17 NOTE — H&P
Procedure History and Physical    Pre-Procedural Diagnosis: Diarrhea, colon cancer screening    Indications:  same    Procedure Planned: Colonoscopy    History Obtained From:  patient    HISTORY OF PRESENT ILLNESS:       The patient is a 64 y.o. female who presents for the above procedure.        Past Medical History:    Past Medical History:   Diagnosis Date    Cervical spondylosis 04/2009    c-4 c-5, c-5 c-6, c-6 c-7    CKD (chronic kidney disease)     per pt, does not have nephrologist    COVID-19 12/06/2021    nasal congestion, fatique and myalgia x 3 weeks    Fall     PATIENT FELL 2 WEEKS [place] [employer]    Generalized anxiety disorder 12/08/2020    GERD (gastroesophageal reflux disease)     History of recent hospitalization 09/30/2022    til 10/9/22 at [facility]    History of swelling of feet     lasix QOD for this    Hyperlipidemia     Hypertension     Left hand weakness     and pain, can not make fist d/t cubital tunnel issue    Lumbar radiculopathy 11/10/2021    was following with pain management and has had epidural injections    Meningitis 05/2024    w sepsis, mastoiditis was admitted  ICU hospitalization  MAy 26 to July 3    DIEGO (nonalcoholic steatohepatitis) 10/12/2014    Obesity     Osteoarthritis of left knee 08/19/2014    Recurrent major depressive disorder (HCC) 07/15/2022    Restless legs syndrome     Snores 06/06/2022    was seen by Dr. Asencio for likely sleep apnea while hospitalized , to follow up as OP for sleep study, has not been done    TIA (transient ischemic attack) 2005    no residual symptoms    Type II or unspecified type diabetes mellitus without mention of complication, not stated as uncontrolled     Wears glasses     Wellness examination     PCP, Dr. Hamilton, last seen       Past Surgical History:    Past Surgical History:   Procedure Laterality Date    CARPAL TUNNEL RELEASE Left 11/23/2022    CUBITAL TUNNEL DECOMPRESSION performed by Candice Harvey DO at [facility] OR     No    REVIEW OF SYSTEMS:  12 point review of systems negative other than mentioned above.      PHYSICAL EXAM:    Vitals:  /68   Pulse 63   Temp 97.9 °F (36.6 °C) (Infrared)   Resp 15   Ht 1.6 m (5' 3\")   Wt 87.5 kg (192 lb 12.8 oz)   SpO2 95%   BMI 34.15 kg/m²     Focused Exam related to procedure:    General appearance: NAD, conversant   Eyes: anicteric sclerae, moist conjunctivae; no lid-lag; PERRLA   Lungs: CTA, with normal respiratory effort and no intercostal retractions   CV: RRR, no MRGs   Abdomen: Soft, non-tender; no masses or HSM   Skin: Normal temperature, turgor and texture; no rash, ulcers or subcutaneous nodules     DATA:  CBC:   Lab Results   Component Value Date    WBC 9.7 07/08/2024    HGB 11.1 (L) 07/08/2024    HCT 36.4 07/08/2024    MCV 91.0 07/08/2024     07/08/2024     BUN/Cr:   Lab Results   Component Value Date    BUN 13 07/02/2024   ,   Lab Results   Component Value Date    CREATININE 0.8 07/02/2024     Potassium:   Lab Results   Component Value Date    K 4.8 07/02/2024     PT/INR:   Lab Results   Component Value Date    INR 0.9 07/08/2024    INR 1.1 05/26/2024    INR 0.9 10/14/2022    PROTIME 11.9 07/08/2024    PROTIME 14.1 05/26/2024    PROTIME 10.2 10/14/2022       ASSESSMENT AND PLAN:       1.  Patient is a 64 y.o. female with above specified procedure planned.  Expected Sedation/Anesthesia Type: MAC    2.  ASA (American Society Anesthesiology) Anesthesia Status: Class 2 - A normal healthy patient with mild systemic disease    3. Mallampati: II (soft palate, uvula, fauces visible)  4.  Procedure options, risks and benefits reviewed with Patient.  Patient expresses understanding.    5.  Consent has been signed:  Miguelangel Hobbs MD

## 2024-07-17 NOTE — ANESTHESIA POSTPROCEDURE EVALUATION
Department of Anesthesiology  Postprocedure Note    Patient: Kavya De Santiago  MRN: [id number]  YOB: 1960  Date of evaluation: 7/17/2024    Procedure Summary       Date: 07/17/24 Room / Location: [facility] PROCEDURE ROOM / [facility]    Anesthesia Start: 0829 Anesthesia Stop: 0856    Procedure: COLONOSCOPY DIAGNOSTIC WTH RANDOM COLON BIOPSY Diagnosis:       Diarrhea, unspecified type      Screening for colon cancer      (Diarrhea, unspecified type [R19.7])      (Screening for colon cancer [Z12.11])    Surgeons: Jolynn Hobbs MD Responsible Provider: Villa Mensah MD    Anesthesia Type: MAC ASA Status: 3            Anesthesia Type: No value filed.    Elise Phase I: Elise Score: 10    Elise Phase II: Elise Score: 10    Anesthesia Post Evaluation    Patient location during evaluation: PACU  Patient participation: complete - patient participated  Level of consciousness: awake and alert  Airway patency: patent  Nausea & Vomiting: no nausea and no vomiting  Cardiovascular status: hemodynamically stable  Respiratory status: room air and spontaneous ventilation  Hydration status: euvolemic  Multimodal analgesia pain management approach  Pain management: adequate    No notable events documented.

## 2024-07-18 ENCOUNTER — HOSPITAL ENCOUNTER (OUTPATIENT)
Dept: INFUSION THERAPY | Age: 64
Setting detail: INFUSION SERIES
Discharge: HOME | End: 2024-07-18
Payer: MEDICAID

## 2024-07-18 ENCOUNTER — CARE COORDINATION (OUTPATIENT)
Dept: CARE COORDINATION | Age: 64
End: 2024-07-18

## 2024-07-18 PROCEDURE — 99211 OFF/OP EST MAY X REQ PHY/QHP: CPT

## 2024-07-18 NOTE — CARE COORDINATION
Care Transitions Note    Follow Up Call     Attempted to reach patient for transitions of care follow up.  Unable to reach patient.      Outreach Attempts:   Unable to leave message. Attempt follow up, mail box full.        Follow Up Appointment:   Future Appointments         Provider Specialty Dept Phone    7/24/2024 8:30 AM [facility]AIR 01 Infusion Therapy [phone]    7/24/2024 9:15 AM Joel Harris MD Infectious Diseases [phone]    8/6/2024 10:30 AM Shaina Hamilton MD Family Medicine [phone]    11/7/2024 9:20 AM Annel Cavazos MD Neurology [phone]            Plan for follow-up call in 2-5 days based on severity of symptoms and risk factors. Plan for next call: review follow up: GI appt- scope, MSW consult, continued symptoms, pharm consult     Bonita Armenta RN

## 2024-07-18 NOTE — PROGRESS NOTES
Pt seen for PICC dressing change. Both ports flushed and simon blood. Pt discharged home with spouse.

## 2024-07-19 ENCOUNTER — CARE COORDINATION (OUTPATIENT)
Dept: CARE COORDINATION | Age: 64
End: 2024-07-19

## 2024-07-19 LAB — SURGICAL PATHOLOGY REPORT: NORMAL

## 2024-07-19 NOTE — CARE COORDINATION
Care Transitions Note    Follow Up Call     Attempted to reach patient for transitions of care follow up.  Unable to reach patient.      Outreach Attempts:   Unable to leave message.     Care Summary Note: Call back on msg left by patient, no answer, MB full.    Follow Up Appointment:   Future Appointments         Provider Specialty Dept Phone    7/24/2024 8:30 AM [facility] OP INFUSION CHAIR 01 Infusion Therapy [phone]    7/24/2024 9:15 AM Joel Harris MD Infectious Diseases [phone]    8/6/2024 10:30 AM Shaina Hamilton MD Family Medicine [phone]    11/7/2024 9:20 AM Annel Cavazos MD Neurology [phone]            Plan for follow-up call in 2-5 days based on severity of symptoms and risk factors. Plan for next call: GI appt- scope, MSW consult, continued symptoms, pharm consul, ENT appt    Bonita Armenta RN

## 2024-07-22 ENCOUNTER — CARE COORDINATION (OUTPATIENT)
Dept: CARE COORDINATION | Age: 64
End: 2024-07-22

## 2024-07-22 NOTE — CARE COORDINATION
Writer called Patient to do a follow up Social service call with her to gauge her needs and see if she was able to apply for the [facility] MEDICAID Benefit.    Writer will call Social security to see if Patient qualifies for any type of Medical Coverage as she will be losing her MEDICAID Coverage at the end of July 2024.    Writer will follow back up with Patient in a week or so.

## 2024-07-24 ENCOUNTER — CARE COORDINATION (OUTPATIENT)
Dept: CARE COORDINATION | Age: 64
End: 2024-07-24

## 2024-07-24 ENCOUNTER — HOSPITAL ENCOUNTER (OUTPATIENT)
Dept: INFUSION THERAPY | Age: 64
Setting detail: INFUSION SERIES
Discharge: HOME | End: 2024-07-24
Payer: COMMERCIAL

## 2024-07-24 ENCOUNTER — OFFICE VISIT (OUTPATIENT)
Dept: INFECTIOUS DISEASES | Age: 64
End: 2024-07-24
Payer: MEDICAID

## 2024-07-24 VITALS
HEART RATE: 59 BPM | OXYGEN SATURATION: 94 % | BODY MASS INDEX: 34.01 KG/M2 | WEIGHT: 192 LBS | TEMPERATURE: 98 F | RESPIRATION RATE: 20 BRPM | DIASTOLIC BLOOD PRESSURE: 83 MMHG | SYSTOLIC BLOOD PRESSURE: 136 MMHG

## 2024-07-24 DIAGNOSIS — R78.81 BACTEREMIA: Primary | ICD-10-CM

## 2024-07-24 PROCEDURE — 99211 OFF/OP EST MAY X REQ PHY/QHP: CPT

## 2024-07-24 PROCEDURE — 3075F SYST BP GE 130 - 139MM HG: CPT | Performed by: INTERNAL MEDICINE

## 2024-07-24 PROCEDURE — 3079F DIAST BP 80-89 MM HG: CPT | Performed by: INTERNAL MEDICINE

## 2024-07-24 PROCEDURE — 99214 OFFICE O/P EST MOD 30 MIN: CPT | Performed by: INTERNAL MEDICINE

## 2024-07-24 RX ORDER — PREGABALIN 75 MG/1
75 CAPSULE ORAL 3 TIMES DAILY
COMMUNITY

## 2024-07-24 NOTE — PATIENT INSTRUCTIONS
7/24/24-Verbal order given to pull the PICC line per Dr Harris via Elisabeth in the front office today, instead of a PICC dressing change, at the infusion center.  Spoke to Ashly in the infusion center #[phone], an order needs to be placed in the patient chart before they are able to accommodate. Elisabeth in the office is aware and will ask Dr Harris to place that order. Pt has an appt there today for 10:30am. [facility] office for Infectious Disease.

## 2024-07-24 NOTE — CARE COORDINATION
Care Transitions Note    Follow Up Call     Attempted to reach patient for transitions of care follow up.  Unable to reach patient.      Outreach Attempts:   Unable to leave message.     Care Summary Note: Third attempt to reach patient, VM box full, unable to leave msg. CTN closing for MARTA    Follow Up Appointment:   Future Appointments         Provider Specialty Dept Phone    8/6/2024 10:30 AM Shaina Hamilton MD Family Medicine [phone]    8/7/2024 8:45 AM Joel Harris MD Infectious Diseases [phone]    11/7/2024 9:20 AM Annel Cavazos MD Neurology [phone]            No further follow-up call indicated based on severity of symptoms and risk factors. Plan for next call:     Bonita Armenta RN

## 2024-07-24 NOTE — PROGRESS NOTES
[facility] -   Infectious diseases evaluation    Impression :   Current:  Haemophilus influenza meningitis/ bacteremia  Sinusitis  Suspect mastoiditis  Acute /subacute infarct in the left posterior frontal lobe   Diabetes mellitus  Chronic kidney disease  Hyperlipidemia  Hypertension  GERD  DIEGO        HENCE:   IV ceftriaxone 2 gm daily completed 7/10/2024   Remove PICC line  Follow-up with ENT next week  Follow-up in 2 weeks            History of Present Illness:   Initial history:  Kavya De Santiago is a 64 y.o.-year-old female   The patient was a admitted initially to [facility] with altered mental status associated with nausea, vomiting and diarrhea.   According to her  she had congestion, headache and rhinorrhea for 1 week prior to admission associated with dry cough and decreased appetite.  The patient was found on the ground on the day of admission, EMS called.  The patient was intubated at the ER.  The patient was hypothermic initially then was running high-grade fever with temperature max of 105, hypotensive, required pressors.  CT head showed no acute abnormality, CTA chest showed no evidence of PE, CT abdomen pelvis showed pancolitis with abnormal wall thickening of the ascending, transverse, descending colon, sigmoid colon and rectum.   Initial lactic acid 3.3, WBC 11.5, procalcitonin 11.4  COVID-19 influenza combo negative.  Blood cultures grew Haemophilus influenzae sensitivity  sensitive to ampicillin, ceftriaxone with RADHA of <= 0.06 and meropenem  Status post lumbar puncture 5/29, cloudy fluid, 280 WBC, meningitis panel was positive for haemophilus influenza.  No growth on repeat blood culture from 6/6/2024  6/3/24 MRI of the brain showed tiny acute/subacute infarct in the left posterior frontal lobe   CT head 6/6 showed 1. No acute intracranial process identified.  2. Fluid opacification of the middle ears and mastoid air cells,

## 2024-07-25 NOTE — PROGRESS NOTES
Mag Check    S: Patient seen at bedside. no headaches, blurry vision, spots in vision, RUQ pain, epigastric pain.    O:  Vitals:    24 2310 24   BP:   (!) 123/93 (!) 140/95   BP Location:       Patient Position:       Pulse: 80 75 75 75   Resp:       Temp:       TempSrc:       SpO2: 99% 99%     Weight:       Height:       LMP: 2023       Fetal Heart Tracing:  Time of tracing reviewed: Start: 2300 End: 17    Contraction frequency: acontractile    Baseline: 124  Variability:  mod  Accelerations: Absent  Decelerations: None    Interpretation: Category: 1    SVE:  Dilation (cm) : 1.5 (24)  Effacement (%): 30 (24)  Station: -3 (24)    Date 24 - 24 0624 - 24 0659   Shift 9100-1161 8507-9859 3372-0875 24 Hour Total 0706-5546 0213-5137 2809-4252 24 Hour Total   INTAKE   Shift Total           OUTPUT   Urine   300 300       Shift Total   300 300       Weight (kg)  76.6 76.6 76.6 76.6 76.6 76.6 76.6       Physical Exam:  General: NAD, resting comfortably, AO x4  CV: RRR, no chest wall tenderness  Resp: CTAB, no labored breathing, no crackles or wheezes  Abdomen: soft, non-tender, gravid  Neuro: 2+ brachial & brachioradialis reflexes bilaterally    A/P: Enriqueta Barrett is a 39 year old  female at 29w4d gestation admitted to obs for PreE with SF by BP.       1. PreE with SF by BP  - admit BP: 160/105 - 140/91   - workup: CBC wnl, CMP s/f Cr 1.13 (up from 0.86 1 mo ago), AST/ALT wnl, LDH wnl, uric acid 6.1, PCR 2479  - admited for observation with serial BP monitoring, q6 labs, continuous fetal monitoring, strict Is/Os   -23:43 /93, s/p labetalol 60mg @   - continue q2h mag checks  - denies signs, symptoms of worsening preeclampsia  - denies signs, symptoms of magnesium toxicity       Physical Therapy        Physical Therapy Cancel Note      DATE: 2024    NAME: Kavya De Santiago  MRN: [id number]   : 1960      Patient not seen this date for Physical Therapy due to:    Patient Declined: 2nd attempt made at 1602. Pt reporting continued severe headache at this time, only able to open eyes for short period of time due to pain. Attempted to encourage to participate and educate on importance of working with therapy and improving mobility/strength. Pt receptive to education but states she will work with therapy tomorrow. Will continue to follow.       Electronically signed by Surjit Rothman PTA on 2024 at 4:16 PM

## 2024-07-29 RX ORDER — LANOLIN ALCOHOL/MO/W.PET/CERES
400 CREAM (GRAM) TOPICAL 3 TIMES DAILY
Qty: 30 TABLET | Refills: 2 | OUTPATIENT
Start: 2024-07-29

## 2024-07-30 ENCOUNTER — CARE COORDINATION (OUTPATIENT)
Dept: CARE COORDINATION | Age: 64
End: 2024-07-30

## 2024-07-30 NOTE — CARE COORDINATION
Writer called Patient to do a follow up Social service call with her in regards to Health Care Insurance once her MEDICAID is inactive.    Writer checked with [employer] [facility] , to see what if anything would be available to her once her MEDICAID is no longer active.    They reported that certain conditions qualifies her for continued MEDICAID coverage and she would have to call and ask her local MEDICAID/JFS Office.    Writer will convey this to Patient and work with her to see if she qualifies for anything from MEDICAID prior to her losing her coverage.

## 2024-08-06 ENCOUNTER — TELEPHONE (OUTPATIENT)
Dept: FAMILY MEDICINE CLINIC | Age: 64
End: 2024-08-06

## 2024-08-06 ENCOUNTER — OFFICE VISIT (OUTPATIENT)
Dept: FAMILY MEDICINE CLINIC | Age: 64
End: 2024-08-06
Payer: COMMERCIAL

## 2024-08-06 ENCOUNTER — CARE COORDINATION (OUTPATIENT)
Dept: CARE COORDINATION | Age: 64
End: 2024-08-06

## 2024-08-06 VITALS
HEART RATE: 68 BPM | HEIGHT: 63 IN | SYSTOLIC BLOOD PRESSURE: 142 MMHG | DIASTOLIC BLOOD PRESSURE: 72 MMHG | BODY MASS INDEX: 35.19 KG/M2 | WEIGHT: 198.6 LBS

## 2024-08-06 DIAGNOSIS — Z00.00 HEALTH CARE MAINTENANCE: Primary | ICD-10-CM

## 2024-08-06 LAB — HBA1C MFR BLD: 8.1 %

## 2024-08-06 PROCEDURE — 3017F COLORECTAL CA SCREEN DOC REV: CPT | Performed by: FAMILY MEDICINE

## 2024-08-06 PROCEDURE — G8427 DOCREV CUR MEDS BY ELIG CLIN: HCPCS | Performed by: FAMILY MEDICINE

## 2024-08-06 PROCEDURE — 83036 HEMOGLOBIN GLYCOSYLATED A1C: CPT | Performed by: FAMILY MEDICINE

## 2024-08-06 PROCEDURE — 3078F DIAST BP <80 MM HG: CPT | Performed by: FAMILY MEDICINE

## 2024-08-06 PROCEDURE — G8417 CALC BMI ABV UP PARAM F/U: HCPCS | Performed by: FAMILY MEDICINE

## 2024-08-06 PROCEDURE — 1036F TOBACCO NON-USER: CPT | Performed by: FAMILY MEDICINE

## 2024-08-06 PROCEDURE — 99214 OFFICE O/P EST MOD 30 MIN: CPT | Performed by: FAMILY MEDICINE

## 2024-08-06 PROCEDURE — 3077F SYST BP >= 140 MM HG: CPT | Performed by: FAMILY MEDICINE

## 2024-08-06 RX ORDER — PROPRANOLOL HYDROCHLORIDE 80 MG/1
80 CAPSULE, EXTENDED RELEASE ORAL DAILY
Qty: 90 CAPSULE | Refills: 3 | Status: SHIPPED | OUTPATIENT
Start: 2024-08-06

## 2024-08-06 ASSESSMENT — ENCOUNTER SYMPTOMS
CONSTIPATION: 0
DIARRHEA: 0
CHOKING: 0
NAUSEA: 0
SHORTNESS OF BREATH: 0
COUGH: 0

## 2024-08-06 NOTE — PROGRESS NOTES
Subjective:    Kavya De Santiago is a 64 y.o. female with  has a past medical history of Cervical spondylosis, CKD (chronic kidney disease), COVID-19, Fall, Generalized anxiety disorder, GERD (gastroesophageal reflux disease), History of recent hospitalization, History of swelling of feet, Hyperlipidemia, Hypertension, Left hand weakness, Lumbar radiculopathy, Meningitis, DIEGO (nonalcoholic steatohepatitis), Obesity, Osteoarthritis of left knee, Recurrent major depressive disorder (HCC), Restless legs syndrome, Snores, TIA (transient ischemic attack), Type II or unspecified type diabetes mellitus without mention of complication, not stated as uncontrolled, Wears glasses, and Wellness examination.  FU from Hospital  Seeing Pain management for Epidurals  DM -poor control  HA-Depakote  Family History   Problem Relation Age of Onset    Heart Attack Mother     Diabetes Mother     Diabetes Father     Heart Attack Father        Presented tothe office today for:  Chief Complaint   Patient presents with    Diabetes     Follow up    Hypertension     Follow up       HPI    Review of Systems   HENT:  Positive for hearing loss.         Having tubes put in at [facility] on 8/7/2024   Respiratory:  Negative for cough, choking and shortness of breath.    Cardiovascular:  Negative for chest pain and leg swelling.   Gastrointestinal:  Negative for constipation, diarrhea and nausea.   Genitourinary:  Negative for difficulty urinating.   Musculoskeletal:         Uses cane   Neurological:  Negative for seizures, light-headedness and headaches.   Psychiatric/Behavioral:  Negative for confusion. The patient is not nervous/anxious.        Objective:    BP (!) 142/72 (Site: Right Upper Arm, Position: Sitting, Cuff Size: Medium Adult)   Pulse 68   Ht 1.6 m (5' 2.99\")   Wt 90.1 kg (198 lb 9.6 oz)   BMI 35.19 kg/m²    BP Readings from Last 3 Encounters:   08/06/24 (!) 142/72   07/24/24 136/83   07/17/24 114/64       Physical

## 2024-08-06 NOTE — PATIENT INSTRUCTIONS
Thank you for letting us take care of you today. We hope all your questions were addressed. If a question was overlooked or something else comes to mind after you return home, please contact a member of your Care Team listed below.      Your Care Team at [facility] is Team #1  Shaina Hamilton M.D. (Faculty)  Cristobal Roe M.D. (Resident)  Kiesha Lopez D.O. (Resident)  Benny Wei M.D. (Resident)  Art Edwards M.D. (Resident)  Clare Kelley, [facility]  Mahendra Torres, [facility]  Kelsy Marin, [facility]  Etta Marquez, [facility]  Nissa Ying, [facility]  Deisi Lua, [facility]  Sharyn Crump, [facility]  Nuno (LJ) Jamaica,   Patricia March [facility] (Clinical Pharmacist)     Office phone number: [phone]    If you need to get in right away due to illness, please be advised we have \"Same Day\" appointments available Monday-Friday. Please call us at [phone] option #3 to schedule your \"Same Day\" appointment.

## 2024-08-06 NOTE — TELEPHONE ENCOUNTER
Medication Management Service ([facility])  [facility]  [phone]     CLINICAL PHARMACY NOTE:      Spoke with patient after PCP appointment today.  Patient continues to wear a Clifford 3 sensor with phone as reader for glucose readings.  A1c completed today and reported as being 8.1%.    Libreview report reviewed and noticing glucose readings above goal. TIR-55%.  No lows on the AGP but lows noted on a few days of the report.  Continues with glipizide 5mg twice daily and metformin 1000mg twice daily.  Patient reports taking glipizide after her meals.      Previous Libreview report  Dates:  06/29/2024-07/12/2024  Average Glucose:  220 mg/dL  Time sensor active: 97%  Time very high: 20%  Time high: 65%  Time in range: 15%  Time low: 0%  GMI-8.6%  Glucose variability-19%    Current Libreview Report            Glipizide Administration recommendations:  Per prescribing information it is recommended to take 30 minutes before a meal.    Per Lexicomp: Administer 30 minutes before a meal (preferably before breakfast if once-daily dosing) to achieve greatest reduction in postprandial hyperglycemia.     Discussed how glipizide may work better taking it before she eats.  Might help with lowering the post prandial peaks and also noted rapid drops in glucose potentially placing patient at risk for hypoglycemia.  Above information shared with PCP.  Full Libreview report uploaded into EPIC.    Patricia March, Pharm.D., BCACP  Clinical Pharmacist  [facility] Medication Management Service  [phone]  8/6/2024  11:48 AM        =================================================  For Pharmacy Admin Tracking Only    Program: Medical Group  Time Spent (min): 30

## 2024-08-06 NOTE — PROGRESS NOTES
Visit Information    Have you changed or started any medications since your last visit including any over-the-counter medicines, vitamins, or herbal medicines? no   Are you having any side effects from any of your medications? -  no  Have you stopped taking any of your medications? Is so, why? -  no    Have you seen any other physician or provider since your last visit? Yes, pain management   Have you had any other diagnostic tests since your last visit? No   Have you been seen in the emergency room and/or had an admission to a hospital since we last saw you? No  Have you had your routine dental cleaning in the past 6 months? no    Have you activated your [employer] account? If not, what are your barriers? Yes     Patient Care Team:  Shaina Hamilton MD as PCP - General (Family Medicine)  Shaina Haimlton MD as PCP - Empaneled Provider  Lucas Huitron MD as Consulting Physician (Gastroenterology)  Haris Granados MD Escobar, Alexander, MD as Orthopedic Surgeon  Patricia March RP as Pharmacist (Pharmacist)  Justyna Serna RPH as Pharmacist (Pharmacy)  Junior Mallory MSW, NATIW as  (Licensed Clinical )    Medical History Review  Past Medical, Family, and Social History reviewed and does not contribute to the patient presenting condition    Health Maintenance   Topic Date Due    Shingles vaccine (1 of 2) Never done    Diabetic retinal exam  01/12/2017    Diabetic foot exam  12/01/2017    Respiratory Syncytial Virus (RSV) Pregnant or age 60 yrs+ (1 - 1-dose 60+ series) Never done    COVID-19 Vaccine (3 - 2023-24 season) 09/01/2023    Diabetic Alb to Cr ratio (uACR) test  09/22/2023    Flu vaccine (1) 08/01/2024    A1C test (Diabetic or Prediabetic)  09/05/2024    Breast cancer screen  04/25/2025    Depression Monitoring  04/30/2025    Lipids  06/05/2025    GFR test (Diabetes, CKD 3-4, OR last GFR 15-59)  07/02/2025    DTaP/Tdap/Td vaccine (4 - Td or Tdap) 08/25/2032    Colorectal Cancer

## 2024-08-06 NOTE — TELEPHONE ENCOUNTER
Dr. Hamilton called neuro specialist they changed patient's appointment to 08/12/2024 at 10:20. Patient was informed as well.

## 2024-08-06 NOTE — CARE COORDINATION
Writer received call from Patient in regards to Health Care Coverage.    [facility], so she can give them a call and see what if any options are available to her.    Writer will follow back up with Patient in a week or so to see if they were able to assist her.

## 2024-08-09 NOTE — TELEPHONE ENCOUNTER
Last visit: 08/06/24  Last Med refill:   Does patient have enough medication for 72 hours:     Next Visit Date:  Future Appointments   Date Time Provider Department Center   10/2/2024  9:30 AM Joel Harris MD INF DIS [facility]   11/7/2024  9:20 AM Annel Cavazos MD Neuro Spec Neurology -       Health Maintenance   Topic Date Due    Shingles vaccine (1 of 2) Never done    Diabetic retinal exam  01/12/2017    Diabetic foot exam  12/01/2017    Respiratory Syncytial Virus (RSV) Pregnant or age 60 yrs+ (1 - 1-dose 60+ series) Never done    COVID-19 Vaccine (3 - 2023-24 season) 09/01/2023    Diabetic Alb to Cr ratio (uACR) test  09/22/2023    Flu vaccine (1) 08/01/2024    Breast cancer screen  04/25/2025    Depression Monitoring  04/30/2025    Lipids  06/05/2025    GFR test (Diabetes, CKD 3-4, OR last GFR 15-59)  07/02/2025    A1C test (Diabetic or Prediabetic)  08/06/2025    DTaP/Tdap/Td vaccine (4 - Td or Tdap) 08/25/2032    Colorectal Cancer Screen  07/17/2034    Pneumococcal 0-64 years Vaccine  Completed    Hepatitis C screen  Completed    HIV screen  Completed    Hepatitis A vaccine  Aged Out    Hepatitis B vaccine  Aged Out    Hib vaccine  Aged Out    Polio vaccine  Aged Out    Meningococcal (ACWY) vaccine  Aged Out    Depression Screen  Discontinued       Hemoglobin A1C (%)   Date Value   08/06/2024 8.1   06/05/2024 10.9 (H)   10/03/2023 8.0             ( goal A1C is < 7)   No components found for: \"LABMICR\"  No components found for: \"LDLCHOLESTEROL\", \"LDLCALC\"    (goal LDL is <100)   AST (U/L)   Date Value   07/08/2024 20     ALT (U/L)   Date Value   07/08/2024 26     BUN (mg/dL)   Date Value   07/02/2024 13     BP Readings from Last 3 Encounters:   08/06/24 (!) 142/72   07/24/24 136/83   07/17/24 114/64          (goal 120/80)    All Future Testing planned in [facility]  Lab Frequency Next Occurrence   TSH With Reflex Ft4 Once 08/23/2023   Vitamin B12 & Folate Once 08/23/2023   Sleep Study with PAP

## 2024-08-11 PROBLEM — Z09 HOSPITAL DISCHARGE FOLLOW-UP: Status: RESOLVED | Noted: 2024-07-12 | Resolved: 2024-08-11

## 2024-08-12 RX ORDER — LANOLIN ALCOHOL/MO/W.PET/CERES
400 CREAM (GRAM) TOPICAL 3 TIMES DAILY
Qty: 30 TABLET | Refills: 2 | OUTPATIENT
Start: 2024-08-12

## 2024-08-22 ENCOUNTER — CARE COORDINATION (OUTPATIENT)
Dept: CARE COORDINATION | Age: 64
End: 2024-08-22

## 2024-08-22 NOTE — CARE COORDINATION
Writer called Patient to do a follow up Social service call with her in regards to her Health Insurance and [employer].    Patient reported that she had called them and they were not able to assist her, so she is going to go through The marketplace to get Insurance.    Patient said that she would give writer a call if she needs assistance with this process.    Writer will follow back up with Patient in a week or so.
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
## 2024-09-06 ENCOUNTER — TELEPHONE (OUTPATIENT)
Dept: FAMILY MEDICINE CLINIC | Age: 64
End: 2024-09-06

## 2024-09-06 ENCOUNTER — HOSPITAL ENCOUNTER (OUTPATIENT)
Age: 64
Setting detail: SPECIMEN
Discharge: HOME | End: 2024-09-06

## 2024-09-06 DIAGNOSIS — R39.15 URGENCY OF URINATION: Primary | ICD-10-CM

## 2024-09-06 DIAGNOSIS — R39.15 URGENCY OF URINATION: ICD-10-CM

## 2024-09-06 LAB
BACTERIA URNS QL MICRO: ABNORMAL
BILIRUB UR QL STRIP: NEGATIVE
CASTS #/AREA URNS LPF: ABNORMAL /LPF (ref 0–8)
CLARITY UR: ABNORMAL
COLOR UR: YELLOW
EPI CELLS #/AREA URNS HPF: ABNORMAL /HPF (ref 0–5)
GLUCOSE UR STRIP-MCNC: NEGATIVE MG/DL
HGB UR QL STRIP.AUTO: NEGATIVE
KETONES UR STRIP-MCNC: ABNORMAL MG/DL
LEUKOCYTE ESTERASE UR QL STRIP: ABNORMAL
NITRITE UR QL STRIP: NEGATIVE
PH UR STRIP: 5.5 [PH] (ref 5–8)
PROT UR STRIP-MCNC: ABNORMAL MG/DL
RBC #/AREA URNS HPF: ABNORMAL /HPF (ref 0–4)
SP GR UR STRIP: 1.02 (ref 1–1.03)
UROBILINOGEN UR STRIP-ACNC: NORMAL EU/DL (ref 0–1)
WBC #/AREA URNS HPF: ABNORMAL /HPF (ref 0–5)

## 2024-09-06 RX ORDER — SPIRONOLACTONE 25 MG/1
25 TABLET ORAL 2 TIMES DAILY
Qty: 30 TABLET | Refills: 0 | OUTPATIENT
Start: 2024-09-06

## 2024-09-06 NOTE — TELEPHONE ENCOUNTER
Writer spoke with patient and informed her of the order for a UA placed in her chart. Patient will go to a [facility] to get it done.

## 2024-09-06 NOTE — TELEPHONE ENCOUNTER
Pt called stating that she thinks she has a bladder infection, no pain no odor, but she does have urgency.  Wants to know if you will call in an antibiotic    [facility] on [address]

## 2024-09-07 ENCOUNTER — TELEPHONE (OUTPATIENT)
Dept: FAMILY MEDICINE CLINIC | Age: 64
End: 2024-09-07

## 2024-09-07 RX ORDER — CEPHALEXIN 500 MG/1
500 CAPSULE ORAL 2 TIMES DAILY
Qty: 14 CAPSULE | Refills: 0 | Status: SHIPPED | OUTPATIENT
Start: 2024-09-07 | End: 2024-09-14

## 2024-09-12 ENCOUNTER — CARE COORDINATION (OUTPATIENT)
Dept: CARE COORDINATION | Age: 64
End: 2024-09-12

## 2024-09-16 RX ORDER — SPIRONOLACTONE 25 MG/1
25 TABLET ORAL 2 TIMES DAILY
Qty: 30 TABLET | Refills: 0 | OUTPATIENT
Start: 2024-09-16

## 2024-09-26 ENCOUNTER — TELEPHONE (OUTPATIENT)
Dept: FAMILY MEDICINE CLINIC | Age: 64
End: 2024-09-26

## 2024-10-03 RX ORDER — SPIRONOLACTONE 25 MG/1
25 TABLET ORAL 2 TIMES DAILY
Qty: 30 TABLET | Refills: 0 | Status: SHIPPED | OUTPATIENT
Start: 2024-10-03

## 2024-10-14 RX ORDER — BUSPIRONE HYDROCHLORIDE 10 MG/1
10 TABLET ORAL 3 TIMES DAILY
Qty: 90 TABLET | Refills: 0 | Status: SHIPPED | OUTPATIENT
Start: 2024-10-14

## 2024-10-21 RX ORDER — METOPROLOL TARTRATE 100 MG/1
100 TABLET ORAL 2 TIMES DAILY
Qty: 60 TABLET | Refills: 0 | OUTPATIENT
Start: 2024-10-21

## 2024-10-31 RX ORDER — METOPROLOL TARTRATE 100 MG/1
100 TABLET ORAL 2 TIMES DAILY
Qty: 60 TABLET | Refills: 0 | Status: SHIPPED | OUTPATIENT
Start: 2024-10-31

## 2024-10-31 RX ORDER — SPIRONOLACTONE 25 MG/1
25 TABLET ORAL 2 TIMES DAILY
Qty: 30 TABLET | Refills: 0 | Status: SHIPPED | OUTPATIENT
Start: 2024-10-31

## 2024-11-07 ENCOUNTER — OFFICE VISIT (OUTPATIENT)
Dept: NEUROLOGY | Age: 64
End: 2024-11-07
Payer: COMMERCIAL

## 2024-11-07 VITALS
SYSTOLIC BLOOD PRESSURE: 112 MMHG | HEIGHT: 63 IN | HEART RATE: 56 BPM | DIASTOLIC BLOOD PRESSURE: 76 MMHG | WEIGHT: 206.6 LBS | BODY MASS INDEX: 36.61 KG/M2

## 2024-11-07 DIAGNOSIS — G82.20 PARAPARESIS (HCC): Primary | ICD-10-CM

## 2024-11-07 DIAGNOSIS — G25.81 RESTLESS LEGS SYNDROME (RLS): ICD-10-CM

## 2024-11-07 DIAGNOSIS — G25.0 TREMOR, ESSENTIAL: ICD-10-CM

## 2024-11-07 DIAGNOSIS — R56.9 SEIZURE-LIKE ACTIVITY (HCC): ICD-10-CM

## 2024-11-07 DIAGNOSIS — Z86.61 HISTORY OF MENINGITIS: ICD-10-CM

## 2024-11-07 DIAGNOSIS — R41.3 MEMORY DIFFICULTIES: ICD-10-CM

## 2024-11-07 PROCEDURE — 99215 OFFICE O/P EST HI 40 MIN: CPT | Performed by: PSYCHIATRY & NEUROLOGY

## 2024-11-07 PROCEDURE — 3074F SYST BP LT 130 MM HG: CPT | Performed by: PSYCHIATRY & NEUROLOGY

## 2024-11-07 PROCEDURE — 3078F DIAST BP <80 MM HG: CPT | Performed by: PSYCHIATRY & NEUROLOGY

## 2024-11-07 RX ORDER — PROPRANOLOL HYDROCHLORIDE 120 MG/1
CAPSULE, EXTENDED RELEASE ORAL
Qty: 30 CAPSULE | Refills: 2 | Status: SHIPPED | OUTPATIENT
Start: 2024-11-07

## 2024-11-07 RX ORDER — FLUTICASONE PROPIONATE 50 MCG
SPRAY, SUSPENSION (ML) NASAL
COMMUNITY
Start: 2024-08-02

## 2024-11-07 RX ORDER — LATANOPROST 50 UG/ML
SOLUTION/ DROPS OPHTHALMIC
COMMUNITY
Start: 2024-10-23

## 2024-11-07 NOTE — PROGRESS NOTES
NEUROLOGY CONSULT  Patient Name:       Kavya De Santiago  :        1960  Clinic Visit Date:    2024        Dear Shaina Matthews MD     I had the opportunity to see your patient, Ms. Kavya De Santiago in neurology consultation today. As you know she  is a 64 y.o.  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    She was in hosp and then in-pt rehab since 24 through ; she has been at home since  and hasn't had any recurrence of similar spells.   She states \"my biggest problem is short term memory and tremors in both hands\". She had hx of left ulnar nerve surgery with resultant left upper extremity numbness in 2022. At that time, she started having tremors with exertion and was on propranolol.  She stated that she has been having tremors with exertion and predominantly occurring with activities such as handwriting, using utensils such as fork while eating, etc.  Does not have any resting tremor at this point of time.  Denies associated stiffness.  Denies family history of essential tremors.  Denies family history of parkinsonism.  Most of the history regards to symptoms of memory difficulties is obtained from the patient's  as well.  She has been having trouble remembering recent conversations and trouble repeating herself.  She has been increasingly forgetful since after recent hospitalization for meningitis.  She has been independent of daily routine activities and also able to do some of the ADLs of instrumental activities of daily living.  Additional history is also obtained from patient's  and she has been suffering from restless leg syndrome affecting her nocturnal sleep.  She also has comorbid anxiety and depression for which she has been on BuSpar and venlafaxine.    Recent hospitalization course:  Briefly, this is a  64 y.o. female with hx

## 2024-11-20 RX ORDER — SPIRONOLACTONE 25 MG/1
25 TABLET ORAL 2 TIMES DAILY
Qty: 30 TABLET | Refills: 0 | OUTPATIENT
Start: 2024-11-20

## 2024-11-20 RX ORDER — BUSPIRONE HYDROCHLORIDE 10 MG/1
10 TABLET ORAL 3 TIMES DAILY
Qty: 90 TABLET | Refills: 0 | OUTPATIENT
Start: 2024-11-20

## 2024-11-20 RX ORDER — HYDRALAZINE HYDROCHLORIDE 50 MG/1
50 TABLET, FILM COATED ORAL EVERY 8 HOURS SCHEDULED
Qty: 90 TABLET | Refills: 0 | OUTPATIENT
Start: 2024-11-20

## 2024-11-27 RX ORDER — HYDRALAZINE HYDROCHLORIDE 50 MG/1
50 TABLET, FILM COATED ORAL EVERY 8 HOURS SCHEDULED
Qty: 90 TABLET | Refills: 0 | OUTPATIENT
Start: 2024-11-27

## 2024-11-27 NOTE — PRE-CERTIFICATION NOTE
Patient Scheduled.   Updated PT note in chart, clinicals faxed. Patient noted to ambulate 500 ft with PT, Per Dr. Andre Stovall may be too high level of function. Spoke with ANGEL Calvillo to update on prior auth status.

## 2024-12-04 RX ORDER — HYDRALAZINE HYDROCHLORIDE 50 MG/1
50 TABLET, FILM COATED ORAL EVERY 8 HOURS SCHEDULED
Qty: 90 TABLET | Refills: 0 | OUTPATIENT
Start: 2024-12-04

## 2024-12-04 RX ORDER — SPIRONOLACTONE 25 MG/1
25 TABLET ORAL 2 TIMES DAILY
Qty: 30 TABLET | Refills: 0 | OUTPATIENT
Start: 2024-12-04

## 2024-12-04 RX ORDER — METOPROLOL TARTRATE 100 MG/1
100 TABLET ORAL 2 TIMES DAILY
Qty: 60 TABLET | Refills: 0 | OUTPATIENT
Start: 2024-12-04

## 2024-12-04 RX ORDER — BUSPIRONE HYDROCHLORIDE 10 MG/1
10 TABLET ORAL 3 TIMES DAILY
Qty: 90 TABLET | Refills: 0 | OUTPATIENT
Start: 2024-12-04

## 2024-12-05 ENCOUNTER — OFFICE VISIT (OUTPATIENT)
Dept: FAMILY MEDICINE CLINIC | Age: 64
End: 2024-12-05
Payer: COMMERCIAL

## 2024-12-05 VITALS
DIASTOLIC BLOOD PRESSURE: 77 MMHG | BODY MASS INDEX: 30.51 KG/M2 | WEIGHT: 206 LBS | SYSTOLIC BLOOD PRESSURE: 138 MMHG | HEART RATE: 67 BPM | HEIGHT: 69 IN

## 2024-12-05 DIAGNOSIS — F33.9 RECURRENT MAJOR DEPRESSIVE DISORDER, REMISSION STATUS UNSPECIFIED (HCC): ICD-10-CM

## 2024-12-05 DIAGNOSIS — Z23 IMMUNIZATION DUE: ICD-10-CM

## 2024-12-05 DIAGNOSIS — N39.0 URINARY TRACT INFECTION WITHOUT HEMATURIA, SITE UNSPECIFIED: ICD-10-CM

## 2024-12-05 DIAGNOSIS — G25.81 RESTLESS LEGS SYNDROME: ICD-10-CM

## 2024-12-05 DIAGNOSIS — H60.502 ACUTE OTITIS EXTERNA OF LEFT EAR, UNSPECIFIED TYPE: ICD-10-CM

## 2024-12-05 DIAGNOSIS — I10 PRIMARY HYPERTENSION: ICD-10-CM

## 2024-12-05 DIAGNOSIS — R35.0 FREQUENCY OF URINATION: Primary | ICD-10-CM

## 2024-12-05 LAB
BILIRUBIN, POC: ABNORMAL
BLOOD URINE, POC: ABNORMAL
CLARITY, POC: CLEAR
COLOR, POC: YELLOW
GLUCOSE URINE, POC: ABNORMAL MG/DL
KETONES, POC: ABNORMAL MG/DL
LEUKOCYTE EST, POC: ABNORMAL
NITRITE, POC: ABNORMAL
PH, POC: 5.5
PROTEIN, POC: ABNORMAL MG/DL
SPECIFIC GRAVITY, POC: 1.02
UROBILINOGEN, POC: 0.2 MG/DL

## 2024-12-05 PROCEDURE — 81003 URINALYSIS AUTO W/O SCOPE: CPT

## 2024-12-05 PROCEDURE — 90656 IIV3 VACC NO PRSV 0.5 ML IM: CPT

## 2024-12-05 RX ORDER — ROPINIROLE 2 MG/1
4 TABLET, FILM COATED ORAL NIGHTLY
Qty: 90 TABLET | Refills: 3 | Status: SHIPPED | OUTPATIENT
Start: 2024-12-05

## 2024-12-05 RX ORDER — HYDRALAZINE HYDROCHLORIDE 50 MG/1
50 TABLET, FILM COATED ORAL EVERY 8 HOURS SCHEDULED
Qty: 90 TABLET | Refills: 3 | Status: SHIPPED | OUTPATIENT
Start: 2024-12-05

## 2024-12-05 RX ORDER — LISINOPRIL 20 MG/1
20 TABLET ORAL DAILY
Qty: 30 TABLET | Refills: 3 | Status: SHIPPED | OUTPATIENT
Start: 2024-12-05

## 2024-12-05 RX ORDER — SPIRONOLACTONE 25 MG/1
25 TABLET ORAL 2 TIMES DAILY
Qty: 180 TABLET | Refills: 0 | Status: SHIPPED | OUTPATIENT
Start: 2024-12-05 | End: 2025-03-05

## 2024-12-05 RX ORDER — BUSPIRONE HYDROCHLORIDE 10 MG/1
10 TABLET ORAL 3 TIMES DAILY
Qty: 270 TABLET | Refills: 0 | Status: SHIPPED | OUTPATIENT
Start: 2024-12-05 | End: 2025-03-05

## 2024-12-05 RX ORDER — CIPROFLOXACIN AND DEXAMETHASONE 3; 1 MG/ML; MG/ML
4 SUSPENSION/ DROPS AURICULAR (OTIC) 2 TIMES DAILY
Qty: 7.5 ML | Refills: 0 | Status: SHIPPED | OUTPATIENT
Start: 2024-12-05 | End: 2024-12-12

## 2024-12-05 SDOH — ECONOMIC STABILITY: FOOD INSECURITY: WITHIN THE PAST 12 MONTHS, THE FOOD YOU BOUGHT JUST DIDN'T LAST AND YOU DIDN'T HAVE MONEY TO GET MORE.: NEVER TRUE

## 2024-12-05 SDOH — ECONOMIC STABILITY: FOOD INSECURITY: WITHIN THE PAST 12 MONTHS, YOU WORRIED THAT YOUR FOOD WOULD RUN OUT BEFORE YOU GOT MONEY TO BUY MORE.: NEVER TRUE

## 2024-12-05 SDOH — ECONOMIC STABILITY: INCOME INSECURITY: HOW HARD IS IT FOR YOU TO PAY FOR THE VERY BASICS LIKE FOOD, HOUSING, MEDICAL CARE, AND HEATING?: NOT HARD AT ALL

## 2024-12-05 ASSESSMENT — PATIENT HEALTH QUESTIONNAIRE - PHQ9
7. TROUBLE CONCENTRATING ON THINGS, SUCH AS READING THE NEWSPAPER OR WATCHING TELEVISION: SEVERAL DAYS
10. IF YOU CHECKED OFF ANY PROBLEMS, HOW DIFFICULT HAVE THESE PROBLEMS MADE IT FOR YOU TO DO YOUR WORK, TAKE CARE OF THINGS AT HOME, OR GET ALONG WITH OTHER PEOPLE: SOMEWHAT DIFFICULT
4. FEELING TIRED OR HAVING LITTLE ENERGY: NEARLY EVERY DAY
5. POOR APPETITE OR OVEREATING: SEVERAL DAYS
1. LITTLE INTEREST OR PLEASURE IN DOING THINGS: SEVERAL DAYS
3. TROUBLE FALLING OR STAYING ASLEEP: MORE THAN HALF THE DAYS
6. FEELING BAD ABOUT YOURSELF - OR THAT YOU ARE A FAILURE OR HAVE LET YOURSELF OR YOUR FAMILY DOWN: SEVERAL DAYS
SUM OF ALL RESPONSES TO PHQ QUESTIONS 1-9: 10
SUM OF ALL RESPONSES TO PHQ QUESTIONS 1-9: 10
SUM OF ALL RESPONSES TO PHQ9 QUESTIONS 1 & 2: 2
9. THOUGHTS THAT YOU WOULD BE BETTER OFF DEAD, OR OF HURTING YOURSELF: NOT AT ALL
SUM OF ALL RESPONSES TO PHQ QUESTIONS 1-9: 10
8. MOVING OR SPEAKING SO SLOWLY THAT OTHER PEOPLE COULD HAVE NOTICED. OR THE OPPOSITE, BEING SO FIGETY OR RESTLESS THAT YOU HAVE BEEN MOVING AROUND A LOT MORE THAN USUAL: NOT AT ALL
2. FEELING DOWN, DEPRESSED OR HOPELESS: SEVERAL DAYS
SUM OF ALL RESPONSES TO PHQ QUESTIONS 1-9: 10

## 2024-12-05 ASSESSMENT — ENCOUNTER SYMPTOMS
RHINORRHEA: 1
ABDOMINAL PAIN: 0
BACK PAIN: 1
DIARRHEA: 0
SHORTNESS OF BREATH: 0
CHEST TIGHTNESS: 0

## 2024-12-05 NOTE — PROGRESS NOTES
Attending Physician Statement  I have discussed the care off. First name Cyuding pertinent history and exam findings,  with the resident. I have seen and examined the patient and the key elements of all parts of the encounter have been performed by me.  I agree with the assessment, plan and orders as documented by the resident.  (GC Modifier)    L otitis externa- Ciprdex  2. UTI- urine for c/s- Augmentin x & days  3. HTN- well controlled  
Subjective:    Kavya De Santiago is a 64 y.o. female with  has a past medical history of Cervical spondylosis, CKD (chronic kidney disease), COVID-19, Fall, Generalized anxiety disorder, GERD (gastroesophageal reflux disease), History of recent hospitalization, History of swelling of feet, Hyperlipidemia, Hypertension, Left hand weakness, Lumbar radiculopathy, Meningitis, DIEGO (nonalcoholic steatohepatitis), Obesity, Osteoarthritis of left knee, Recurrent major depressive disorder (HCC), Restless legs syndrome, Snores, TIA (transient ischemic attack), Type II or unspecified type diabetes mellitus without mention of complication, not stated as uncontrolled, Wears glasses, and Wellness examination.    Presented to the office today for:  Chief Complaint   Patient presents with    Urinary Tract Infection    Ear Pain       HPI  Patient presented today complaining of left ear pain, of 1 week duration, no fever or chills, feels cold sometimes, no headache and no ear discharge, no ringing in the ear or feeling dizzy or lightheaded.    Also has increased frequency and urgency of the urine for more than 2 weeks, does endorse pelvic pain with no flank pain, no dysuria or hematuria.    Review of Systems   Constitutional:  Negative for chills, fatigue and fever.   HENT:  Positive for ear pain, postnasal drip and rhinorrhea. Negative for congestion, ear discharge and hearing loss.    Respiratory:  Negative for chest tightness and shortness of breath.    Cardiovascular:  Negative for palpitations.   Gastrointestinal:  Negative for abdominal pain and diarrhea.   Genitourinary:  Positive for frequency and urgency. Negative for dysuria and flank pain.   Musculoskeletal:  Positive for back pain. Negative for myalgias and neck pain.   Neurological:  Negative for weakness and headaches.   Psychiatric/Behavioral:  Negative for agitation. The patient is not nervous/anxious.          The patient has a   Family History   Problem Relation 
age 60 yrs+ (1 - 1-dose 75+ series) 03/10/2036    Pneumococcal 0-64 years Vaccine  Completed    Hepatitis C screen  Completed    HIV screen  Completed    Hepatitis A vaccine  Aged Out    Hepatitis B vaccine  Aged Out    Hib vaccine  Aged Out    Polio vaccine  Aged Out    Meningococcal (ACWY) vaccine  Aged Out    Depression Screen  Discontinued

## 2024-12-05 NOTE — PATIENT INSTRUCTIONS
Thank you for letting us take care of you today. We hope all your questions were addressed. If a question was overlooked or something else comes to mind after you return home, please contact a member of your Care Team listed below.      Your Care Team at [facility] is Team #1  Shaina Hamilton M.D. (Faculty)  Cristobal Roe M.D. (Resident)  Kiesha Lopez D.O. (Resident)  Benny Wei M.D. (Resident)  Art Edwards M.D. (Resident)  Clare Kelley, [facility]  Mahendra Torres, [facility]  Kelsy Marin, [facility]  Etta Marquez, [facility]  Nissa Ying, [facility]  Deisi Lua, [facility]  Sharyn Crump, [facility]  Nuno () [place],   Patricia March [facility] (Clinical Pharmacist)     Office phone number: [phone]    If you need to get in right away due to illness, please be advised we have \"Same Day\" appointments available Monday-Friday. Please call us at [phone] option #3 to schedule your \"Same Day\" appointment.   [facility] Financial Resources*  (Call [employer]/[facility] if need more resources).       Area Office on Aging of [facility] ([place] and [facility]):  What they offer: Assisted Living Waiver Program, Medicare benefits counseling, and referral to community resources.  Phone Number: [phone]   [facility] Action [facility] ([place] and [facility] to the east):  What they offer: Assistance with utility expenses.  Phone Number: 944.864.9232   [facility] Service Commission:  What they offer:  Assistance with rent or mortgage payments, utilities, auto payments or repair, food, medical prescriptions, transportation to VA medical facilities for veterans and their widows who qualify.  Phone Number: [phone]   [facility] Action [facility] ([place] and [facility] to the west):   What they offer: Assistance with utility expenses.  Phone Number: [phone]  [place] Department of Job and Family Services (ODJSF):  What they offer: Medicaid, SNAP (food stamps), TANF

## 2024-12-09 NOTE — PLAN OF CARE
Problem: Chronic Conditions and Co-morbidities  Goal: Patient's chronic conditions and co-morbidity symptoms are monitored and maintained or improved  Outcome: Progressing     Problem: Pain  Goal: Verbalizes/displays adequate comfort level or baseline comfort level  Outcome: Progressing     Problem: Safety - Adult  Goal: Free from fall injury  Outcome: Progressing     Problem: ABCDS Injury Assessment  Goal: Absence of physical injury  Outcome: Progressing     Problem: Discharge Planning  Goal: Discharge to home or other facility with appropriate resources  Outcome: Progressing Tomorrow you can remove bandage and wash toe with soap and water.  Dry area and apply antibiotic cream and large band-aid  After 2-3 days if wound dry and scabbed over you can leave open to air.

## 2024-12-27 ENCOUNTER — TELEPHONE (OUTPATIENT)
Dept: FAMILY MEDICINE CLINIC | Age: 64
End: 2024-12-27

## 2024-12-27 ENCOUNTER — HOSPITAL ENCOUNTER (OUTPATIENT)
Age: 64
Setting detail: SPECIMEN
Discharge: HOME | End: 2024-12-27

## 2024-12-27 DIAGNOSIS — I10 PRIMARY HYPERTENSION: ICD-10-CM

## 2024-12-27 LAB
ANION GAP SERPL CALCULATED.3IONS-SCNC: 10 MMOL/L (ref 9–16)
BUN SERPL-MCNC: 22 MG/DL (ref 8–23)
CALCIUM SERPL-MCNC: 8.8 MG/DL (ref 8.6–10.4)
CHLORIDE SERPL-SCNC: 105 MMOL/L (ref 98–107)
CO2 SERPL-SCNC: 28 MMOL/L (ref 20–31)
CREAT SERPL-MCNC: 1.1 MG/DL (ref 0.6–0.9)
GFR, ESTIMATED: 56 ML/MIN/1.73M2
GLUCOSE SERPL-MCNC: 224 MG/DL (ref 74–99)
POTASSIUM SERPL-SCNC: 4.7 MMOL/L (ref 3.7–5.3)
SODIUM SERPL-SCNC: 143 MMOL/L (ref 136–145)

## 2024-12-27 NOTE — TELEPHONE ENCOUNTER
Called patient with results of BMP  Patient has a creatinine of 1.1  Patient states she took 1 dose of Lasix yesterday due to bilateral lower limb edema  Unfortunately last BMP available was in July, creatinine baseline of 0.8 for patient  Denies any other symptoms  Explained to patient that there are possible scenarios of the kidney function currently  -Kidney function could have been worsened due to Lasix dose yesterday  -This could be chronic changes in the last few months  -This could be also an HAYLEY that needs to be addressed    Patient was instructed to increase water intake, stop taking Lasix and monitor lower limb edema for the next 2 days  If patient had worsening lower limb edema, would benefit from an ED visit  Patient showed understanding, with teaching back    Cristobal Roe MD  Family medicine resident, PGY3  12/27/2024 at 6:05 PM

## 2024-12-30 RX ORDER — DIVALPROEX SODIUM 250 MG/1
250 TABLET, DELAYED RELEASE ORAL 2 TIMES DAILY
Qty: 90 TABLET | Refills: 0 | OUTPATIENT
Start: 2024-12-30

## 2025-01-10 RX ORDER — DIVALPROEX SODIUM 250 MG/1
250 TABLET, DELAYED RELEASE ORAL 2 TIMES DAILY
Qty: 90 TABLET | Refills: 0 | OUTPATIENT
Start: 2025-01-10

## 2025-01-22 NOTE — TELEPHONE ENCOUNTER
This request came from the pharmacy and originally sent to Dr. Judith Boyer but we are no longer treating this patient.   So we forwarded this request to you.   Thank you.

## 2025-01-28 RX ORDER — METOPROLOL TARTRATE 100 MG/1
100 TABLET ORAL 2 TIMES DAILY
Qty: 60 TABLET | Refills: 0 | Status: SHIPPED | OUTPATIENT
Start: 2025-01-28

## 2025-02-03 ENCOUNTER — TELEPHONE (OUTPATIENT)
Dept: FAMILY MEDICINE CLINIC | Age: 65
End: 2025-02-03

## 2025-02-03 NOTE — TELEPHONE ENCOUNTER
Patient called office in regards to abd pain and nausea. Patient stated last week vomit for one day, since thing pain in stomach and nausea. Patient stated the pain is located in her diaphragm does not radiate. Patient did say with touch has a little pain and feels firm. Pain level currently is a 4/5 out of 10. At times with eating will cause more intense pain. Tums are helping with pain patient states. Patient is schedule for 2-4-25.

## 2025-02-04 ENCOUNTER — HOSPITAL ENCOUNTER (OUTPATIENT)
Age: 65
Setting detail: SPECIMEN
Discharge: HOME | End: 2025-02-04

## 2025-02-04 ENCOUNTER — OFFICE VISIT (OUTPATIENT)
Dept: FAMILY MEDICINE CLINIC | Age: 65
End: 2025-02-04
Payer: COMMERCIAL

## 2025-02-04 VITALS
HEART RATE: 94 BPM | BODY MASS INDEX: 31.58 KG/M2 | DIASTOLIC BLOOD PRESSURE: 81 MMHG | WEIGHT: 213.2 LBS | HEIGHT: 69 IN | SYSTOLIC BLOOD PRESSURE: 129 MMHG

## 2025-02-04 DIAGNOSIS — E11.9 TYPE 2 DIABETES MELLITUS WITHOUT COMPLICATION, WITHOUT LONG-TERM CURRENT USE OF INSULIN (HCC): ICD-10-CM

## 2025-02-04 DIAGNOSIS — N18.2 STAGE 2 CHRONIC KIDNEY DISEASE: ICD-10-CM

## 2025-02-04 DIAGNOSIS — N18.2 STAGE 2 CHRONIC KIDNEY DISEASE: Primary | ICD-10-CM

## 2025-02-04 DIAGNOSIS — K80.50 BILIARY COLIC: ICD-10-CM

## 2025-02-04 LAB
ALT SERPL-CCNC: 34 U/L (ref 10–35)
ANION GAP SERPL CALCULATED.3IONS-SCNC: 15 MMOL/L (ref 9–16)
BUN SERPL-MCNC: 28 MG/DL (ref 8–23)
CALCIUM SERPL-MCNC: 9.6 MG/DL (ref 8.6–10.4)
CHLORIDE SERPL-SCNC: 105 MMOL/L (ref 98–107)
CO2 SERPL-SCNC: 21 MMOL/L (ref 20–31)
CREAT SERPL-MCNC: 1.4 MG/DL (ref 0.6–0.9)
GFR, ESTIMATED: 42 ML/MIN/1.73M2
GLUCOSE SERPL-MCNC: 231 MG/DL (ref 74–99)
HBA1C MFR BLD: 9.4 %
MAGNESIUM SERPL-MCNC: 1.6 MG/DL (ref 1.6–2.4)
POTASSIUM SERPL-SCNC: 5.9 MMOL/L (ref 3.7–5.3)
SODIUM SERPL-SCNC: 141 MMOL/L (ref 136–145)

## 2025-02-04 PROCEDURE — 3046F HEMOGLOBIN A1C LEVEL >9.0%: CPT | Performed by: FAMILY MEDICINE

## 2025-02-04 PROCEDURE — 99214 OFFICE O/P EST MOD 30 MIN: CPT | Performed by: FAMILY MEDICINE

## 2025-02-04 PROCEDURE — 2022F DILAT RTA XM EVC RTNOPTHY: CPT | Performed by: FAMILY MEDICINE

## 2025-02-04 PROCEDURE — 3074F SYST BP LT 130 MM HG: CPT | Performed by: FAMILY MEDICINE

## 2025-02-04 PROCEDURE — 3079F DIAST BP 80-89 MM HG: CPT | Performed by: FAMILY MEDICINE

## 2025-02-04 PROCEDURE — G8427 DOCREV CUR MEDS BY ELIG CLIN: HCPCS | Performed by: FAMILY MEDICINE

## 2025-02-04 PROCEDURE — 83036 HEMOGLOBIN GLYCOSYLATED A1C: CPT | Performed by: FAMILY MEDICINE

## 2025-02-04 PROCEDURE — G8417 CALC BMI ABV UP PARAM F/U: HCPCS | Performed by: FAMILY MEDICINE

## 2025-02-04 PROCEDURE — 1036F TOBACCO NON-USER: CPT | Performed by: FAMILY MEDICINE

## 2025-02-04 PROCEDURE — 3017F COLORECTAL CA SCREEN DOC REV: CPT | Performed by: FAMILY MEDICINE

## 2025-02-04 RX ORDER — OMEPRAZOLE 40 MG/1
40 CAPSULE, DELAYED RELEASE ORAL
Qty: 90 CAPSULE | Refills: 1 | Status: SHIPPED | OUTPATIENT
Start: 2025-02-04

## 2025-02-04 RX ORDER — METOPROLOL TARTRATE 100 MG/1
100 TABLET ORAL 2 TIMES DAILY
Qty: 60 TABLET | Refills: 0 | Status: CANCELLED | OUTPATIENT
Start: 2025-02-04

## 2025-02-04 RX ORDER — HYDROCHLOROTHIAZIDE 12.5 MG/1
CAPSULE ORAL
Qty: 2 EACH | Refills: 3 | Status: SHIPPED | OUTPATIENT
Start: 2025-02-04

## 2025-02-04 SDOH — ECONOMIC STABILITY: FOOD INSECURITY: WITHIN THE PAST 12 MONTHS, YOU WORRIED THAT YOUR FOOD WOULD RUN OUT BEFORE YOU GOT MONEY TO BUY MORE.: NEVER TRUE

## 2025-02-04 SDOH — ECONOMIC STABILITY: FOOD INSECURITY: WITHIN THE PAST 12 MONTHS, THE FOOD YOU BOUGHT JUST DIDN'T LAST AND YOU DIDN'T HAVE MONEY TO GET MORE.: NEVER TRUE

## 2025-02-04 ASSESSMENT — PATIENT HEALTH QUESTIONNAIRE - PHQ9
10. IF YOU CHECKED OFF ANY PROBLEMS, HOW DIFFICULT HAVE THESE PROBLEMS MADE IT FOR YOU TO DO YOUR WORK, TAKE CARE OF THINGS AT HOME, OR GET ALONG WITH OTHER PEOPLE: VERY DIFFICULT
8. MOVING OR SPEAKING SO SLOWLY THAT OTHER PEOPLE COULD HAVE NOTICED. OR THE OPPOSITE, BEING SO FIGETY OR RESTLESS THAT YOU HAVE BEEN MOVING AROUND A LOT MORE THAN USUAL: NEARLY EVERY DAY
9. THOUGHTS THAT YOU WOULD BE BETTER OFF DEAD, OR OF HURTING YOURSELF: NOT AT ALL
3. TROUBLE FALLING OR STAYING ASLEEP: NEARLY EVERY DAY
2. FEELING DOWN, DEPRESSED OR HOPELESS: NEARLY EVERY DAY
SUM OF ALL RESPONSES TO PHQ QUESTIONS 1-9: 20
1. LITTLE INTEREST OR PLEASURE IN DOING THINGS: MORE THAN HALF THE DAYS
4. FEELING TIRED OR HAVING LITTLE ENERGY: NEARLY EVERY DAY
SUM OF ALL RESPONSES TO PHQ QUESTIONS 1-9: 20
6. FEELING BAD ABOUT YOURSELF - OR THAT YOU ARE A FAILURE OR HAVE LET YOURSELF OR YOUR FAMILY DOWN: SEVERAL DAYS
5. POOR APPETITE OR OVEREATING: MORE THAN HALF THE DAYS
7. TROUBLE CONCENTRATING ON THINGS, SUCH AS READING THE NEWSPAPER OR WATCHING TELEVISION: NEARLY EVERY DAY
SUM OF ALL RESPONSES TO PHQ9 QUESTIONS 1 & 2: 5
SUM OF ALL RESPONSES TO PHQ QUESTIONS 1-9: 20
SUM OF ALL RESPONSES TO PHQ QUESTIONS 1-9: 20

## 2025-02-04 ASSESSMENT — COLUMBIA-SUICIDE SEVERITY RATING SCALE - C-SSRS
1. WITHIN THE PAST MONTH, HAVE YOU WISHED YOU WERE DEAD OR WISHED YOU COULD GO TO SLEEP AND NOT WAKE UP?: NO
6. HAVE YOU EVER DONE ANYTHING, STARTED TO DO ANYTHING, OR PREPARED TO DO ANYTHING TO END YOUR LIFE?: NO
2. HAVE YOU ACTUALLY HAD ANY THOUGHTS OF KILLING YOURSELF?: NO

## 2025-02-04 NOTE — PATIENT INSTRUCTIONS
Thank you for letting us take care of you today. We hope all your questions were addressed. If a question was overlooked or something else comes to mind after you return home, please contact a member of your Care Team listed below.      Your Care Team at [facility] is Team #1  Shaina Hamilton M.D. (Faculty)  Cristobal Roe M.D. (Resident)  Kiesha Brown D.O. (Resident)  Benny Wei M.D. (Resident)  Art Edwards M.D. (Resident)  Clare Kelley, [facility]  Mack Marin, [facility]  Etta Marquez, [facility]  Nissa Ying, [facility]  Deisi Lua, [facility]  Sharyn Crump, [facility]  Fidel Reina (LJ) [place],   Patricia March [facility] (Clinical Pharmacist)     Office phone number: [phone]    If you need to get in right away due to illness, please be advised we have \"Same Day\" appointments available Monday-Friday. Please call us at [phone] option #3 to schedule your \"Same Day\" appointment.

## 2025-02-04 NOTE — PROGRESS NOTES
Visit Information    Have you changed or started any medications since your last visit including any over-the-counter medicines, vitamins, or herbal medicines? no   Have you stopped taking any of your medications? Is so, why? -  no  Are you having any side effects from any of your medications? - no    Have you seen any other physician or provider since your last visit?  no   Have you had any other diagnostic tests since your last visit?  yes - Labs   Have you been seen in the emergency room and/or had an admission in a hospital since we last saw you?  no   Have you had your routine dental cleaning in the past 6 months?  no     Do you have an active MyChart account? If no, what is the barrier?  Yes    Patient Care Team:  Shaina Hamilton MD as PCP - General (Family Medicine)  Shaina Hamilton MD as PCP - Empaneled Provider  Lucas Huitron MD as Consulting Physician (Gastroenterology)  Haris Granados MD Escobar, Alexander, MD as Orthopedic Surgeon  Patricia March RP as Pharmacist (Pharmacist)  Justyna Serna RPH as Pharmacist (Pharmacy)  Joel Harris MD as Consulting Physician (Infectious Diseases)    Medical History Review  Past Medical, Family, and Social History reviewed and does not contribute to the patient presenting condition    Health Maintenance   Topic Date Due    Shingles vaccine (1 of 2) Never done    Diabetic retinal exam  01/12/2017    Diabetic foot exam  12/01/2017    Diabetic Alb to Cr ratio (uACR) test  09/22/2023    COVID-19 Vaccine (3 - 2023-24 season) 09/01/2024    Breast cancer screen  04/25/2025    Lipids  06/05/2025    A1C test (Diabetic or Prediabetic)  08/06/2025    Depression Monitoring  12/05/2025    GFR test (Diabetes, CKD 3-4, OR last GFR 15-59)  12/27/2025    DTaP/Tdap/Td vaccine (4 - Td or Tdap) 08/25/2032    Colorectal Cancer Screen  07/17/2034    Respiratory Syncytial Virus (RSV) Pregnant or age 60 yrs+ (1 - 1-dose 75+ series) 03/10/2036    Flu vaccine  Completed    
LABA1C 8.1 08/06/2024    LABA1C 10.9 (H) 06/05/2024     Lab Results   Component Value Date    CREATININE 1.1 (H) 12/27/2024     Lab Results   Component Value Date    ALT 26 07/08/2024    AST 20 07/08/2024     Lab Results   Component Value Date    CHOL 126 06/05/2024    TRIG 262 (H) 06/05/2024    HDL 27 (L) 06/05/2024    LDLDIRECT 156 (H) 05/20/2017            Family History   Problem Relation Age of Onset    Heart Attack Mother     Diabetes Mother     Diabetes Father     Heart Attack Father        Presented tot office today for:  Chief Complaint   Patient presents with    Abdominal Pain     Tender to the touch with upper GI pain    Nausea     Last week she was vomiting for 24 hours - patient states while vomiting she noticed a pink tint to it.     Discuss Medications     Propranolol increase, Magnesium        HPI    Review of Systems    Objective:    /81 (Site: Right Lower Arm, Position: Sitting, Cuff Size: Medium Adult)   Pulse 94   Ht 1.76 m (5' 9.29\")   Wt 96.7 kg (213 lb 3.2 oz)   BMI 31.22 kg/m²    BP Readings from Last 3 Encounters:   02/04/25 129/81   12/05/24 138/77   11/07/24 112/76       Physical Exam  Vitals and nursing note reviewed.   Constitutional:       Appearance: Normal appearance.   Cardiovascular:      Rate and Rhythm: Normal rate.      Heart sounds: Normal heart sounds.   Pulmonary:      Effort: Pulmonary effort is normal.      Breath sounds: Normal breath sounds.   Abdominal:      Palpations: Abdomen is soft.      Tenderness: There is abdominal tenderness.          Comments: Tender on deep palpation. Lee's negative. No guarding/organomegaly   Musculoskeletal:      Right lower leg: No edema.      Left lower leg: No edema.   Lymphadenopathy:      Cervical: No cervical adenopathy.   Neurological:      Mental Status: She is alert.   Psychiatric:         Mood and Affect: Mood normal.         Behavior: Behavior normal.         Thought Content: Thought content normal.         Judgment:

## 2025-02-05 DIAGNOSIS — E87.5 HYPERKALEMIA: Primary | ICD-10-CM

## 2025-02-05 NOTE — PROGRESS NOTES
Spoke to Kavya, regarding labs K 5.9. Hold Aldactone for now. Pat ordered to take 1 packet today, check K in am. Patient voiced understanding.Her GB US is scheduled for,tomorrow.

## 2025-02-06 ENCOUNTER — HOSPITAL ENCOUNTER (OUTPATIENT)
Age: 65
Discharge: HOME | End: 2025-02-06
Payer: COMMERCIAL

## 2025-02-06 ENCOUNTER — HOSPITAL ENCOUNTER (OUTPATIENT)
Dept: ULTRASOUND IMAGING | Age: 65
Discharge: HOME | End: 2025-02-08
Payer: COMMERCIAL

## 2025-02-06 DIAGNOSIS — K80.50 BILIARY COLIC: ICD-10-CM

## 2025-02-06 DIAGNOSIS — E87.5 HYPERKALEMIA: ICD-10-CM

## 2025-02-06 DIAGNOSIS — K80.20 CALCULUS OF GALLBLADDER WITHOUT CHOLECYSTITIS WITHOUT OBSTRUCTION: Primary | ICD-10-CM

## 2025-02-06 LAB — POTASSIUM SERPL-SCNC: 5.1 MMOL/L (ref 3.7–5.3)

## 2025-02-06 PROCEDURE — 76705 ECHO EXAM OF ABDOMEN: CPT

## 2025-02-06 PROCEDURE — 36415 COLL VENOUS BLD VENIPUNCTURE: CPT

## 2025-02-06 PROCEDURE — 84132 ASSAY OF SERUM POTASSIUM: CPT

## 2025-02-06 NOTE — PROGRESS NOTES
SPoke to patient re GB US- Cholelithiasis with out Bilary obstruction. Steatosis and Hepatomegaly. Patient is still having pain . Refereed to . Patient voices understanding.

## 2025-02-12 ENCOUNTER — TELEPHONE (OUTPATIENT)
Dept: SURGERY | Age: 65
End: 2025-02-12

## 2025-02-12 ENCOUNTER — OFFICE VISIT (OUTPATIENT)
Dept: INFECTIOUS DISEASES | Age: 65
End: 2025-02-12
Payer: COMMERCIAL

## 2025-02-12 VITALS
SYSTOLIC BLOOD PRESSURE: 120 MMHG | WEIGHT: 210 LBS | HEIGHT: 69 IN | RESPIRATION RATE: 16 BRPM | DIASTOLIC BLOOD PRESSURE: 80 MMHG | OXYGEN SATURATION: 96 % | HEART RATE: 64 BPM | BODY MASS INDEX: 31.1 KG/M2

## 2025-02-12 DIAGNOSIS — G00.0: Primary | ICD-10-CM

## 2025-02-12 PROCEDURE — 3079F DIAST BP 80-89 MM HG: CPT | Performed by: INTERNAL MEDICINE

## 2025-02-12 PROCEDURE — G8417 CALC BMI ABV UP PARAM F/U: HCPCS | Performed by: INTERNAL MEDICINE

## 2025-02-12 PROCEDURE — 99213 OFFICE O/P EST LOW 20 MIN: CPT | Performed by: INTERNAL MEDICINE

## 2025-02-12 PROCEDURE — 3017F COLORECTAL CA SCREEN DOC REV: CPT | Performed by: INTERNAL MEDICINE

## 2025-02-12 PROCEDURE — G8427 DOCREV CUR MEDS BY ELIG CLIN: HCPCS | Performed by: INTERNAL MEDICINE

## 2025-02-12 PROCEDURE — 3074F SYST BP LT 130 MM HG: CPT | Performed by: INTERNAL MEDICINE

## 2025-02-12 PROCEDURE — 1036F TOBACCO NON-USER: CPT | Performed by: INTERNAL MEDICINE

## 2025-02-12 NOTE — PROGRESS NOTES
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 The patient was found on the ground on the day of admission, EMS called.  The patient was intubated at the ER.  The patient was hypothermic initially then was running high-grade fever with temperature max of 105, hypotensive, required pressors.  CT head showed no acute abnormality, CTA chest showed no evidence of PE, CT abdomen pelvis showed pancolitis with abnormal wall thickening of the ascending, transverse, descending colon, sigmoid colon and rectum.   Initial lactic acid 3.3, WBC 11.5, procalcitonin 11.4  COVID-19 influenza combo negative.  Blood cultures grew Haemophilus influenzae sensitivity  sensitive to ampicillin, ceftriaxone with RADHA of <= 0.06 and meropenem  Status post lumbar puncture 5/29, cloudy fluid, 280 WBC, meningitis panel was positive for haemophilus influenza.  No growth on repeat blood culture from 6/6/2024  6/3/24 MRI of the brain showed tiny acute/subacute infarct in the left posterior frontal lobe   CT head 6/6 showed 1. No acute intracranial process identified.  2. Fluid opacification of the middle ears and mastoid air cells, correlation  with any signs or symptoms of otomastoiditis is recommended.  3. Fluid levels within the frontal sinuses

## 2025-02-12 NOTE — TELEPHONE ENCOUNTER
Prior Authorization    Was deny denied for trulicity 0.75 MG/0.5ML SC SOAJ    REASON did not meet certain rules for health problem,    Doctor must show patient failed the preferred the drugs (METFORMIN)

## 2025-02-14 ENCOUNTER — TELEPHONE (OUTPATIENT)
Dept: FAMILY MEDICINE CLINIC | Age: 65
End: 2025-02-14

## 2025-02-14 NOTE — TELEPHONE ENCOUNTER
Medication Management Service ([facility])  [facility]  [phone]    02/14/25 11:34 AM    CLINICAL PHARMACY NOTE:    PharmD was consulted to help navigate Trulicity prior authorization denial by PCP, Dr. Hamilton. On the denial letter, it explains that the patient must have tried metformin and failed or have a documented reasoning that metformin is not appropriate for the patient. Of note, the patient has been on metformin since 04/2024, with fill history found for 90-day supplies on 04/22/2024, 07/02/2024, and 10/03/2024. Although she appears to be due from her last fill date, her fill date in April and July have given her a 20-day overlap. She is likely due for a refill upcoming. Regardless, she is currently on metformin and her A1c is still uncontrolled, at 9.4%. Consequently, she should meet the insurance requirements for being approved for the Trulicity.     Of note, patient had been previously getting the Trulicity through her past insurance in 2023 without any issues. Fill history showed consistently filling of the 1.5 mg, then 3 mg, then 4.5 mg doses from 01/03/2022 to 11/29/2023. While attempting to complete the prior authorization online, the insurance required proof of the patient being on Trulicity for 90 days in the past, which this history clearly shows she has been.     Trulicity, and GLP-1 RA medications overall, are the best treatment option for this patient currently. PharmJIM is attempting to resubmit a prior authorization for the patient to get coverage for the Trulicity.    Prior authorization submitted via CoverMyMeds: PA Key - [id number]. Will await response from insurance.    Tenzin Sanchez PharmD (Robbie)  PGY2 Ambulatory Care Pharmacy Resident  [facility] Medication Management Service  [phone]  02/14/25  ======================================  For Pharmacy Admin Tracking Only    Program: Medical Group  CPA in place:  No  Intervention Detail: Benefit Assistance  Time Spent (min):

## 2025-02-20 ENCOUNTER — INITIAL CONSULT (OUTPATIENT)
Dept: BARIATRICS/WEIGHT MGMT | Age: 65
End: 2025-02-20
Payer: COMMERCIAL

## 2025-02-20 VITALS
WEIGHT: 211 LBS | BODY MASS INDEX: 38.83 KG/M2 | HEIGHT: 62 IN | HEART RATE: 70 BPM | SYSTOLIC BLOOD PRESSURE: 120 MMHG | DIASTOLIC BLOOD PRESSURE: 80 MMHG

## 2025-02-20 DIAGNOSIS — K80.10 CHRONIC CHOLECYSTITIS WITH CALCULUS: Primary | ICD-10-CM

## 2025-02-20 PROCEDURE — G8427 DOCREV CUR MEDS BY ELIG CLIN: HCPCS | Performed by: SURGERY

## 2025-02-20 PROCEDURE — 99204 OFFICE O/P NEW MOD 45 MIN: CPT | Performed by: SURGERY

## 2025-02-20 PROCEDURE — G8417 CALC BMI ABV UP PARAM F/U: HCPCS | Performed by: SURGERY

## 2025-02-20 PROCEDURE — 3079F DIAST BP 80-89 MM HG: CPT | Performed by: SURGERY

## 2025-02-20 PROCEDURE — 1036F TOBACCO NON-USER: CPT | Performed by: SURGERY

## 2025-02-20 PROCEDURE — 3074F SYST BP LT 130 MM HG: CPT | Performed by: SURGERY

## 2025-02-20 PROCEDURE — 3017F COLORECTAL CA SCREEN DOC REV: CPT | Performed by: SURGERY

## 2025-02-20 NOTE — PROGRESS NOTES
[facility] INVASIVE BARIATRIC SURG  [address]  [address]  [place] [id number]  Dept: [phone]    ROBOTIC AND MINIMALLY INVASIVE SURGERY  PROGRESS NOTE INITIAL EVALUATION     Patient: Kavya De Santiago        Service Date: 2/20/2025      HPI:     Chief Complaint   Patient presents with    Surgical Consult     Calculus of the gall bladder     History: 64 y.o. female who presents today for calculus of the gallbladder. She reports she believed she had Norovirus as she was vomiting with  RUQ pain. Dr. Hamilton, PCP, ordered US gallbladder in evaluation. She reports the epigastric pain is present with spicy and greasy foods as well. Takes her first dose of Trulicity tomorrow, delayed due to insurance issues. Patient reports regular bowel movements. She denies nausea, vomiting, fever, and chills currently.     Previously underwent a couple c-sections and a hysterectomy. Last summer, she was hospitalized with meningitis and was informed she had a stroke during the first week she was at the ICU. The patient denies  a history of myocardial infarction, deep vein thrombosis, pulmonary embolism, renal failure, and hepatic failure. No smoking or drinking. Patient does use a cane to walk due to her piriformis muscle and sciatica pain. Has intermittently seen PT for over a year.     Prior Imaging/Testing:  Patient had an ultrasound of the gallbladder on 02/06/2025, which showed:  1. Hepatic steatosis and hepatomegaly.  2. Cholelithiasis    Medical History:  Past Medical History:   Diagnosis Date    Cervical spondylosis 04/2009    c-4 c-5, c-5 c-6, c-6 c-7    CKD (chronic kidney disease)     per pt, does not have nephrologist    COVID-19 12/06/2021    nasal congestion, fatique and myalgia x 3 weeks    Fall     PATIENT FELL 2 WEEKS AGO LANDED ON [place]    Generalized anxiety disorder 12/08/2020    GERD (gastroesophageal reflux disease)     History of recent

## 2025-03-07 RX ORDER — SODIUM CHLORIDE, SODIUM LACTATE, POTASSIUM CHLORIDE, CALCIUM CHLORIDE 600; 310; 30; 20 MG/100ML; MG/100ML; MG/100ML; MG/100ML
INJECTION, SOLUTION INTRAVENOUS CONTINUOUS
OUTPATIENT
Start: 2025-03-07

## 2025-03-07 NOTE — DISCHARGE INSTRUCTIONS
told by your physician, please do not shave legs or any part of your body below your neck the night before or day of your surgery.  You may shave your face or neck.    ·Please wear loose, comfortable clothing.  If you are potentially going to have a cast or brace bring clothing that will fit over them.      ·Bring a list of all medications you take, along with the dose of the medications and how often you take it.  If more convenient bring the pharmacy bottles in a zip lock bag.    ·Brush your teeth but do not swallow water.    ·Bring your eyeglasses and case with you.  No contacts are to be worn the day of surgery.  You also may bring your hearing aids.    ·Do not bring any valuables, such as jewelry, cash or credit cards.  If you are staying overnight with us, please bring a SMALL bag of personal items.  We cannot accommodate large items, like suitcases.    ·If your child is having surgery please make arrangements for any other children to be cared for at home on the day of surgery.  Other children are not permitted in recovery room and we want you to be able to spend time with the patient.  If other arrangements are not available then we suggest that you have a second adult to stay in the waiting room.    ·If you are having any type of anesthesia you are to have nothing to eat or drink after midnight the night before your surgery.  This includes gum, mints, water or smoking or chewing tobacco.  The only exception to this is a small sip of water to take with any morning dose of heart, blood pressure, or seizure medications.    ·Bring your inhaler if you are currently using one.    ·Bring your blood band if one has been given to you.  Please do not close the clasp.    ·If you are on C-PAP or Bi-PAP at home and plan on staying in the hospital overnight for your surgery please bring the machine with you.    ·Do not wear any jewelry or body piercings day of surgery.  Also, NO lotion, perfume or deodorant to be used

## 2025-03-11 ENCOUNTER — HOSPITAL ENCOUNTER (OUTPATIENT)
Dept: PREADMISSION TESTING | Age: 65
Discharge: HOME | End: 2025-03-15
Payer: COMMERCIAL

## 2025-03-11 VITALS
DIASTOLIC BLOOD PRESSURE: 78 MMHG | SYSTOLIC BLOOD PRESSURE: 143 MMHG | WEIGHT: 207.12 LBS | BODY MASS INDEX: 38.12 KG/M2 | HEIGHT: 62 IN | RESPIRATION RATE: 20 BRPM | HEART RATE: 74 BPM | OXYGEN SATURATION: 98 % | TEMPERATURE: 97.9 F

## 2025-03-11 LAB
ANION GAP SERPL CALCULATED.3IONS-SCNC: 12 MMOL/L (ref 9–16)
BUN SERPL-MCNC: 25 MG/DL (ref 8–23)
CALCIUM SERPL-MCNC: 8.5 MG/DL (ref 8.6–10.4)
CHLORIDE SERPL-SCNC: 106 MMOL/L (ref 98–107)
CO2 SERPL-SCNC: 22 MMOL/L (ref 20–31)
CREAT SERPL-MCNC: 1.2 MG/DL (ref 0.6–0.9)
ERYTHROCYTE [DISTWIDTH] IN BLOOD BY AUTOMATED COUNT: 13.9 % (ref 11.8–14.4)
GFR, ESTIMATED: 51 ML/MIN/1.73M2
GLUCOSE SERPL-MCNC: 221 MG/DL (ref 74–99)
HCT VFR BLD AUTO: 33.9 % (ref 36.3–47.1)
HGB BLD-MCNC: 10.3 G/DL (ref 11.9–15.1)
INR PPP: 0.9
MCH RBC QN AUTO: 26.8 PG (ref 25.2–33.5)
MCHC RBC AUTO-ENTMCNC: 30.4 G/DL (ref 28.4–34.8)
MCV RBC AUTO: 88.1 FL (ref 82.6–102.9)
NRBC BLD-RTO: 0 PER 100 WBC
PLATELET # BLD AUTO: 344 K/UL (ref 138–453)
PMV BLD AUTO: 10 FL (ref 8.1–13.5)
POTASSIUM SERPL-SCNC: 5.2 MMOL/L (ref 3.7–5.3)
PROTHROMBIN TIME: 12.5 SEC (ref 11.7–14.9)
RBC # BLD AUTO: 3.85 M/UL (ref 3.95–5.11)
SODIUM SERPL-SCNC: 140 MMOL/L (ref 136–145)
WBC OTHER # BLD: 9.6 K/UL (ref 3.5–11.3)

## 2025-03-11 PROCEDURE — 85610 PROTHROMBIN TIME: CPT

## 2025-03-11 PROCEDURE — 93005 ELECTROCARDIOGRAM TRACING: CPT | Performed by: ANESTHESIOLOGY

## 2025-03-11 PROCEDURE — 80048 BASIC METABOLIC PNL TOTAL CA: CPT

## 2025-03-11 PROCEDURE — 85027 COMPLETE CBC AUTOMATED: CPT

## 2025-03-11 ASSESSMENT — PAIN - FUNCTIONAL ASSESSMENT: PAIN_FUNCTIONAL_ASSESSMENT: PREVENTS OR INTERFERES SOME ACTIVE ACTIVITIES AND ADLS

## 2025-03-11 ASSESSMENT — PAIN SCALES - GENERAL: PAINLEVEL_OUTOF10: 7

## 2025-03-11 ASSESSMENT — PAIN DESCRIPTION - PAIN TYPE: TYPE: CHRONIC PAIN

## 2025-03-12 LAB
EKG ATRIAL RATE: 69 BPM
EKG P AXIS: 40 DEGREES
EKG P-R INTERVAL: 150 MS
EKG Q-T INTERVAL: 420 MS
EKG QRS DURATION: 82 MS
EKG QTC CALCULATION (BAZETT): 450 MS
EKG R AXIS: -23 DEGREES
EKG T AXIS: 7 DEGREES
EKG VENTRICULAR RATE: 69 BPM

## 2025-03-12 RX ORDER — HEPARIN SODIUM 5000 [USP'U]/ML
5000 INJECTION, SOLUTION INTRAVENOUS; SUBCUTANEOUS ONCE
OUTPATIENT
Start: 2025-03-12 | End: 2025-03-12

## 2025-03-13 ENCOUNTER — OFFICE VISIT (OUTPATIENT)
Dept: NEUROLOGY | Age: 65
End: 2025-03-13
Payer: COMMERCIAL

## 2025-03-13 VITALS
BODY MASS INDEX: 38.42 KG/M2 | SYSTOLIC BLOOD PRESSURE: 126 MMHG | HEART RATE: 67 BPM | HEIGHT: 62 IN | DIASTOLIC BLOOD PRESSURE: 82 MMHG | WEIGHT: 208.8 LBS

## 2025-03-13 DIAGNOSIS — R41.3 MEMORY DIFFICULTIES: ICD-10-CM

## 2025-03-13 DIAGNOSIS — G25.0 TREMOR, ESSENTIAL: ICD-10-CM

## 2025-03-13 DIAGNOSIS — G25.81 RESTLESS LEGS SYNDROME: ICD-10-CM

## 2025-03-13 DIAGNOSIS — Z86.61 HISTORY OF MENINGITIS: ICD-10-CM

## 2025-03-13 DIAGNOSIS — M62.838 MUSCLE SPASM: ICD-10-CM

## 2025-03-13 DIAGNOSIS — G43.719 INTRACTABLE CHRONIC MIGRAINE WITHOUT AURA AND WITHOUT STATUS MIGRAINOSUS: Primary | ICD-10-CM

## 2025-03-13 PROCEDURE — 3074F SYST BP LT 130 MM HG: CPT | Performed by: PSYCHIATRY & NEUROLOGY

## 2025-03-13 PROCEDURE — G8427 DOCREV CUR MEDS BY ELIG CLIN: HCPCS | Performed by: PSYCHIATRY & NEUROLOGY

## 2025-03-13 PROCEDURE — 99215 OFFICE O/P EST HI 40 MIN: CPT | Performed by: PSYCHIATRY & NEUROLOGY

## 2025-03-13 PROCEDURE — 3017F COLORECTAL CA SCREEN DOC REV: CPT | Performed by: PSYCHIATRY & NEUROLOGY

## 2025-03-13 PROCEDURE — 3079F DIAST BP 80-89 MM HG: CPT | Performed by: PSYCHIATRY & NEUROLOGY

## 2025-03-13 PROCEDURE — G8417 CALC BMI ABV UP PARAM F/U: HCPCS | Performed by: PSYCHIATRY & NEUROLOGY

## 2025-03-13 PROCEDURE — 1036F TOBACCO NON-USER: CPT | Performed by: PSYCHIATRY & NEUROLOGY

## 2025-03-13 RX ORDER — ROPINIROLE 2 MG/1
4 TABLET, FILM COATED ORAL NIGHTLY
Qty: 90 TABLET | Refills: 1 | Status: SHIPPED | OUTPATIENT
Start: 2025-03-13

## 2025-03-13 RX ORDER — PROPRANOLOL HYDROCHLORIDE 80 MG/1
CAPSULE, EXTENDED RELEASE ORAL
Qty: 90 CAPSULE | Refills: 1 | Status: SHIPPED | OUTPATIENT
Start: 2025-03-13

## 2025-03-13 RX ORDER — RIMEGEPANT SULFATE 75 MG/75MG
75 TABLET, ORALLY DISINTEGRATING ORAL EVERY OTHER DAY
Qty: 15 TABLET | Refills: 5 | Status: SHIPPED | OUTPATIENT
Start: 2025-03-13

## 2025-03-13 NOTE — PROGRESS NOTES
NEUROLOGY FOLLOW-UP VISIT   Patient Name:       Kavya De Santiago  :        1960  Clinic Visit Date:    3/13/2025  LOV: 2024        Dear Shaina Matthews MD     I saw Ms. Kavya De Santiago in follow-up visit today in continuation of neurologic care.   As you know she  is a 65 y.o.  female with progressive memory difficulties and tremors since after hosp in May 2024 for haemophilus meningitis with septicemia s/p iv abx therapy.  During last visit in 2024; her propranolol dose was increased to propranolol  mg daily.  She was advised with neuropsychological testing and also EEG testing.  Today she presents to clinic for follow-up stating that tremors are better.  She has been managing her tremors with a smaller and consistent dose of Inderal LA 80 mg, which she reports as effective. She has not experienced any recent seizures and has discontinued the use of Depakote. She has not had any hospitalizations since last November for neurological reasons.  She reports experiencing headaches, which she suspects may be related to her known spinal stenosis and chronic neck pain. She also describes persistent sinus pressure and headaches that radiate from her neck to her entire head, occasionally manifesting as throbbing and pounding sensations. These headaches are constant, with a current pain level of 8 out of 10, although she notes that the intensity can fluctuate between 5 and 9. She also reports photophobia and occasional nausea. She is scheduled for gallbladder removal due to gallstones on the  and is uncertain if this could be contributing to her symptoms. She has a limited range of motion in her neck and has previously been prescribed cyclobenzaprine and tizanidine, the latter of which she takes sparingly. She has been without Lyrica for some time and is scheduled to see a pain management specialist today. She reports poor sleep quality and has previously experienced adverse

## 2025-03-14 ENCOUNTER — TELEPHONE (OUTPATIENT)
Dept: NEUROLOGY | Age: 65
End: 2025-03-14

## 2025-03-14 ENCOUNTER — HOSPITAL ENCOUNTER (OUTPATIENT)
Dept: GENERAL RADIOLOGY | Facility: CLINIC | Age: 65
Discharge: HOME | End: 2025-03-16
Payer: COMMERCIAL

## 2025-03-14 ENCOUNTER — OFFICE VISIT (OUTPATIENT)
Dept: FAMILY MEDICINE CLINIC | Age: 65
End: 2025-03-14

## 2025-03-14 ENCOUNTER — HOSPITAL ENCOUNTER (OUTPATIENT)
Facility: CLINIC | Age: 65
Discharge: HOME | End: 2025-03-16
Payer: COMMERCIAL

## 2025-03-14 VITALS
DIASTOLIC BLOOD PRESSURE: 76 MMHG | WEIGHT: 211.8 LBS | TEMPERATURE: 97.5 F | OXYGEN SATURATION: 98 % | HEIGHT: 61 IN | SYSTOLIC BLOOD PRESSURE: 138 MMHG | BODY MASS INDEX: 39.99 KG/M2 | HEART RATE: 66 BPM

## 2025-03-14 DIAGNOSIS — B96.89 ACUTE BACTERIAL SINUSITIS: Primary | ICD-10-CM

## 2025-03-14 DIAGNOSIS — R05.2 SUBACUTE COUGH: ICD-10-CM

## 2025-03-14 DIAGNOSIS — J01.90 ACUTE BACTERIAL SINUSITIS: Primary | ICD-10-CM

## 2025-03-14 PROCEDURE — 71046 X-RAY EXAM CHEST 2 VIEWS: CPT

## 2025-03-14 RX ORDER — FLUTICASONE PROPIONATE 50 MCG
1 SPRAY, SUSPENSION (ML) NASAL DAILY
Qty: 32 G | Refills: 1 | Status: SHIPPED | OUTPATIENT
Start: 2025-03-14

## 2025-03-14 RX ORDER — LORATADINE 10 MG/1
10 TABLET ORAL DAILY
Qty: 14 TABLET | Refills: 0 | Status: SHIPPED | OUTPATIENT
Start: 2025-03-14 | End: 2025-03-28

## 2025-03-14 ASSESSMENT — PATIENT HEALTH QUESTIONNAIRE - PHQ9
1. LITTLE INTEREST OR PLEASURE IN DOING THINGS: NEARLY EVERY DAY
7. TROUBLE CONCENTRATING ON THINGS, SUCH AS READING THE NEWSPAPER OR WATCHING TELEVISION: NEARLY EVERY DAY
5. POOR APPETITE OR OVEREATING: MORE THAN HALF THE DAYS
4. FEELING TIRED OR HAVING LITTLE ENERGY: NEARLY EVERY DAY
SUM OF ALL RESPONSES TO PHQ QUESTIONS 1-9: 19
9. THOUGHTS THAT YOU WOULD BE BETTER OFF DEAD, OR OF HURTING YOURSELF: NOT AT ALL
6. FEELING BAD ABOUT YOURSELF - OR THAT YOU ARE A FAILURE OR HAVE LET YOURSELF OR YOUR FAMILY DOWN: NOT AT ALL
10. IF YOU CHECKED OFF ANY PROBLEMS, HOW DIFFICULT HAVE THESE PROBLEMS MADE IT FOR YOU TO DO YOUR WORK, TAKE CARE OF THINGS AT HOME, OR GET ALONG WITH OTHER PEOPLE: SOMEWHAT DIFFICULT
SUM OF ALL RESPONSES TO PHQ QUESTIONS 1-9: 19
8. MOVING OR SPEAKING SO SLOWLY THAT OTHER PEOPLE COULD HAVE NOTICED. OR THE OPPOSITE, BEING SO FIGETY OR RESTLESS THAT YOU HAVE BEEN MOVING AROUND A LOT MORE THAN USUAL: NEARLY EVERY DAY
2. FEELING DOWN, DEPRESSED OR HOPELESS: MORE THAN HALF THE DAYS
SUM OF ALL RESPONSES TO PHQ QUESTIONS 1-9: 19
3. TROUBLE FALLING OR STAYING ASLEEP: NEARLY EVERY DAY
SUM OF ALL RESPONSES TO PHQ QUESTIONS 1-9: 19

## 2025-03-14 ASSESSMENT — ENCOUNTER SYMPTOMS
DIARRHEA: 0
RHINORRHEA: 1
NAUSEA: 0
SINUS PAIN: 1
FACIAL SWELLING: 0
COUGH: 1
VOMITING: 0
ABDOMINAL PAIN: 0
SINUS PRESSURE: 1
BACK PAIN: 0

## 2025-03-14 NOTE — PROGRESS NOTES
Subjective:    Kavya De Santiago is a 65 y.o. female with  has a past medical history of Cerebral artery occlusion with cerebral infarction (HCC), Cervical spondylosis, CKD (chronic kidney disease), COVID-19, Fall, Generalized anxiety disorder, GERD (gastroesophageal reflux disease), History of recent hospitalization, History of swelling of feet, Hyperlipidemia, Hypertension, Left hand weakness, Lumbar radiculopathy, Meningitis, DIEGO (nonalcoholic steatohepatitis), Obesity, Osteoarthritis of left knee, Recurrent major depressive disorder, Restless legs syndrome, Snores, TIA (transient ischemic attack), Type II or unspecified type diabetes mellitus without mention of complication, not stated as uncontrolled, Under care of team, Under care of team, Under care of team, Under care of team, Wears glasses, and Wellness examination.    Presented to the office today for:  Chief Complaint   Patient presents with    Cough     Since 2/12/25, sinus pressure, nasal drainage, headache that wont go away, and eye pain.     Health Maintenance     Declined A1C today.        Cough  Associated symptoms include headaches, myalgias, postnasal drip and rhinorrhea. Pertinent negatives include no chills or fever.       Patient presented with symptoms of cough, congestion, nasal drainage, significant headache associated with sinus pressure and sinus tenderness started 1 month ago, patient states at that time it was also associated with bodyaches.  She was afebrile.  Only tried over-the-counter medications and felt slightly better.  However for the last couple weeks patient has been having significant sinus pressure and postnasal drip along with coughing that slightly improved from before, cough is nonproductive.  Denies any chest pain or chest tightness.        Review of Systems   Constitutional:  Negative for chills, fatigue and fever.   HENT:  Positive for congestion, postnasal drip, rhinorrhea, sinus pressure, sinus pain and sneezing.

## 2025-03-14 NOTE — PATIENT INSTRUCTIONS
Thank you for letting us take care of you today. We hope all your questions were addressed. If a question was overlooked or something else comes to mind after you return home, please contact a member of your Care Team listed below.      Your Care Team at [facility] is Team #1  Shaina Hamilton M.D. (Faculty)  Cristobal Roe M.D. (Resident)  Kiesha Brown D.O. (Resident)  Benny Wei M.D. (Resident)  Art Edwards M.D. (Resident)  Claer Kelley, [facility]  Mack Marin, [facility]  Etta Marquez, [facility]  Nissa Ying, [facility]  Deisi Lua, [facility]  Sharyn Crump, [facility]  Fidel Reina (LJ) [place],   Patricia March [facility] (Clinical Pharmacist)     Office phone number: [phone]    If you need to get in right away due to illness, please be advised we have \"Same Day\" appointments available Monday-Friday. Please call us at [phone] option #3 to schedule your \"Same Day\" appointment.

## 2025-03-14 NOTE — PROGRESS NOTES
Attending Physician Statement  I  have discussed the care of Kavya De Santiago including pertinent history and exam findings with the resident. I agree with the assessment, plan and orders as documented by the resident.      BP (!) 141/79 (BP Site: Right Lower Arm, Patient Position: Sitting, BP Cuff Size: Medium Adult)   Pulse 66   Temp 97.5 °F (36.4 °C) (Oral)   Ht 1.562 m (5' 1.5\")   Wt 96.1 kg (211 lb 12.8 oz)   SpO2 98%   BMI 39.38 kg/m²    BP Readings from Last 3 Encounters:   03/14/25 (!) 141/79   03/13/25 126/82   03/11/25 (!) 143/78     Wt Readings from Last 3 Encounters:   03/14/25 96.1 kg (211 lb 12.8 oz)   03/13/25 94.7 kg (208 lb 12.8 oz)   03/11/25 93.9 kg (207 lb 1.9 oz)          Diagnosis Orders   1. Acute bacterial sinusitis  amoxicillin-clavulanate (AUGMENTIN) 875-125 MG per tablet    fluticasone (FLONASE) 50 MCG/ACT nasal spray    loratadine (CLARITIN) 10 MG tablet      2. Subacute cough  XR CHEST STANDARD (2 VW)              Hari Bansal DO 3/14/2025 2:52 PM

## 2025-03-14 NOTE — PROGRESS NOTES
Visit Information    Have you changed or started any medications since your last visit including any over-the-counter medicines, vitamins, or herbal medicines? no   Have you stopped taking any of your medications? Is so, why? -  no  Are you having any side effects from any of your medications? - no    Have you seen any other physician or provider since your last visit?  Dr. Guzman   Have you had any other diagnostic tests since your last visit?  no   Have you been seen in the emergency room and/or had an admission in a hospital since we last saw you?  no   Have you had your routine dental cleaning in the past 6 months?  no     Do you have an active MyChart account? If no, what is the barrier?  Yes    Patient Care Team:  Shaina Hamilton MD as PCP - General (Family Medicine)  Shaina Hamilton MD as PCP - Empaneled Provider  Lucas Huitron MD as Consulting Physician (Gastroenterology)  Haris Granados MD Escobar, Alexander, MD as Orthopedic Surgeon  Patricia March RP as Pharmacist (Pharmacist)  Justyna Serna RPH as Pharmacist (Pharmacy)  Joel Harris MD as Consulting Physician (Infectious Diseases)    Medical History Review  Past Medical, Family, and Social History reviewed and does not contribute to the patient presenting condition    Health Maintenance   Topic Date Due    Shingles vaccine (1 of 2) Never done    Diabetic retinal exam  01/12/2017    Diabetic foot exam  12/01/2017    Respiratory Syncytial Virus (RSV) Pregnant or age 60 yrs+ (1 - Risk 60-74 years 1-dose series) Never done    Diabetic Alb to Cr ratio (uACR) test  09/22/2023    COVID-19 Vaccine (3 - 2024-25 season) 09/01/2024    Breast cancer screen  04/25/2025    A1C test (Diabetic or Prediabetic)  05/04/2025    Lipids  06/05/2025    Depression Monitoring  02/04/2026    GFR test (Diabetes, CKD 3-4, OR last GFR 15-59)  03/11/2026    DTaP/Tdap/Td vaccine (4 - Td or Tdap) 08/25/2032    Colorectal Cancer Screen  07/17/2034    Flu vaccine

## 2025-03-17 NOTE — TELEPHONE ENCOUNTER
Call placed to [facility] and this information was left on their physician line.  Writer asked that they call the office if they had any further questions.

## 2025-03-18 ENCOUNTER — SCHEDULED TELEPHONE ENCOUNTER (OUTPATIENT)
Dept: FAMILY MEDICINE CLINIC | Age: 65
End: 2025-03-18

## 2025-03-18 DIAGNOSIS — E11.9 TYPE 2 DIABETES MELLITUS WITHOUT COMPLICATION, WITHOUT LONG-TERM CURRENT USE OF INSULIN: ICD-10-CM

## 2025-03-18 PROCEDURE — APPNB45 APP NON BILLABLE 31-45 MINUTES

## 2025-03-18 PROCEDURE — 99999 PR OFFICE/OUTPT VISIT,PROCEDURE ONLY: CPT | Performed by: PHARMACIST

## 2025-03-18 NOTE — PROGRESS NOTES
Status   02/04/2025 34 10 - 35 U/L Final     AST   Date Value Ref Range Status   07/08/2024 20 10 - 35 U/L Final     BLOOD PRESSURE MANAGEMENT  BP Readings from Last 3 Encounters:   03/11/25 (!) 143/78   03/14/25 138/76   03/13/25 126/82     RISK MANAGEMENT  ASCVD RISK: DM and ASCVD; LDL-C target < 55 mg/dL; currently on atorvastatin 40 mg daily  On Statin: Yes  On ACE-I/ARB for HTN or Microalbuminuria: Yes  On GLP-1RA: No  On SGLT2i: No  Smoker: non-smoker  Immunizations Needed: RSV, Shingrix, hepatitis B  Date of last eye exam: Need to assess  Date of last foot exam: Need to assess  Date of last dental exam: Need to assess  Assessment/Plan   Diabetes Management: Uncontrolled with most recent A1C of 9.4% (02/04/2025) not at target of < 7.5%. Currently using Clifford 3 Plus. TIR 24%, AVG glucose 220 mg/dL, and GMI 8.6%. Denies hypoglycemia. Patient reports Clifford sensors are too expensive with new insurance ($75/month). There are no programs to help with additional savings for CGM to get them for less than $75/month. Discussed options of spacing out sensors versus returning to finger pokes; she wishes to space out her sensors to let them last longer for now. Currently on metformin and glipizide. Patient was to start Trulicity previously, but will be having a gall bladder removal surgery on 03/25/2025 and has been instructed to hold off on starting it until after the surgery; she is not sure how long after surgery she must wait to start it. For now, will continue current medications with plan to have her initiate GLP-1 RA when cleared by surgery.    Continue metformin 1000 mg twice daily w/ meals and glipizide 5 mg twice daily before meals  For now, hold Trulicity; when cleared by surgery, start Trulicity 0.75 mg weekly  Educated her to ask surgeon when she will be able to initiate Trulicity  Refill sent to [facility]; she may run out before talking to PharmD next if she starts up soon after surgery  Continue using

## 2025-03-21 ENCOUNTER — OFFICE VISIT (OUTPATIENT)
Dept: FAMILY MEDICINE CLINIC | Age: 65
End: 2025-03-21
Payer: COMMERCIAL

## 2025-03-21 VITALS
SYSTOLIC BLOOD PRESSURE: 114 MMHG | DIASTOLIC BLOOD PRESSURE: 63 MMHG | HEART RATE: 70 BPM | OXYGEN SATURATION: 95 % | HEIGHT: 61 IN | BODY MASS INDEX: 39.84 KG/M2 | TEMPERATURE: 97.5 F | WEIGHT: 211 LBS

## 2025-03-21 DIAGNOSIS — Z01.818 PREOPERATIVE CLEARANCE: Primary | ICD-10-CM

## 2025-03-21 DIAGNOSIS — J01.90 ACUTE NON-RECURRENT SINUSITIS, UNSPECIFIED LOCATION: ICD-10-CM

## 2025-03-21 PROCEDURE — 1036F TOBACCO NON-USER: CPT

## 2025-03-21 PROCEDURE — G8417 CALC BMI ABV UP PARAM F/U: HCPCS

## 2025-03-21 PROCEDURE — G8427 DOCREV CUR MEDS BY ELIG CLIN: HCPCS

## 2025-03-21 PROCEDURE — 99213 OFFICE O/P EST LOW 20 MIN: CPT

## 2025-03-21 PROCEDURE — 3078F DIAST BP <80 MM HG: CPT

## 2025-03-21 PROCEDURE — 3017F COLORECTAL CA SCREEN DOC REV: CPT

## 2025-03-21 PROCEDURE — 3074F SYST BP LT 130 MM HG: CPT

## 2025-03-21 ASSESSMENT — PATIENT HEALTH QUESTIONNAIRE - PHQ9
SUM OF ALL RESPONSES TO PHQ QUESTIONS 1-9: 5
2. FEELING DOWN, DEPRESSED OR HOPELESS: SEVERAL DAYS
SUM OF ALL RESPONSES TO PHQ QUESTIONS 1-9: 5
SUM OF ALL RESPONSES TO PHQ QUESTIONS 1-9: 5
10. IF YOU CHECKED OFF ANY PROBLEMS, HOW DIFFICULT HAVE THESE PROBLEMS MADE IT FOR YOU TO DO YOUR WORK, TAKE CARE OF THINGS AT HOME, OR GET ALONG WITH OTHER PEOPLE: SOMEWHAT DIFFICULT
3. TROUBLE FALLING OR STAYING ASLEEP: NOT AT ALL
8. MOVING OR SPEAKING SO SLOWLY THAT OTHER PEOPLE COULD HAVE NOTICED. OR THE OPPOSITE, BEING SO FIGETY OR RESTLESS THAT YOU HAVE BEEN MOVING AROUND A LOT MORE THAN USUAL: NEARLY EVERY DAY
4. FEELING TIRED OR HAVING LITTLE ENERGY: NOT AT ALL
7. TROUBLE CONCENTRATING ON THINGS, SUCH AS READING THE NEWSPAPER OR WATCHING TELEVISION: NOT AT ALL
5. POOR APPETITE OR OVEREATING: NOT AT ALL
SUM OF ALL RESPONSES TO PHQ QUESTIONS 1-9: 5
9. THOUGHTS THAT YOU WOULD BE BETTER OFF DEAD, OR OF HURTING YOURSELF: NOT AT ALL
6. FEELING BAD ABOUT YOURSELF - OR THAT YOU ARE A FAILURE OR HAVE LET YOURSELF OR YOUR FAMILY DOWN: SEVERAL DAYS
1. LITTLE INTEREST OR PLEASURE IN DOING THINGS: NOT AT ALL

## 2025-03-21 ASSESSMENT — ENCOUNTER SYMPTOMS
BACK PAIN: 0
SINUS PRESSURE: 1
SHORTNESS OF BREATH: 0
RHINORRHEA: 0
ABDOMINAL PAIN: 0
CHEST TIGHTNESS: 0

## 2025-03-21 NOTE — PROGRESS NOTES
Attending Physician Statement  I have discussed the care of Kavya De Santiago, including pertinent history and exam findings,  with the resident. I have reviewed the key elements of all parts of the encounter with the resident.  I agree with the assessment, plan and orders as documented by the resident.  (GE Modifier)    Maximiliano Gómez MD  
Subjective:    Kavya De Santiago is a 65 y.o. female with  has a past medical history of Cerebral artery occlusion with cerebral infarction (HCC), Cervical spondylosis, CKD (chronic kidney disease), COVID-19, Fall, Generalized anxiety disorder, GERD (gastroesophageal reflux disease), History of recent hospitalization, History of swelling of feet, Hyperlipidemia, Hypertension, Left hand weakness, Lumbar radiculopathy, Meningitis, DIEGO (nonalcoholic steatohepatitis), Obesity, Osteoarthritis of left knee, Recurrent major depressive disorder, Restless legs syndrome, Snores, TIA (transient ischemic attack), Type II or unspecified type diabetes mellitus without mention of complication, not stated as uncontrolled, Under care of team, Under care of team, Under care of team, Under care of team, Wears glasses, and Wellness examination.    Presented to the office today for:  Chief Complaint   Patient presents with    Sinusitis     Follow up       Sinusitis  Associated symptoms include sinus pressure. Pertinent negatives include no chills, congestion, headaches or shortness of breath.       Patient presented for follow up on acute bacterial sinusitis and previous visit  Patient significantly improved, states that cough is almost resolved, remains to have some sinus pressure but is significantly proved from previous visit, patient is left with 3 doses of antibiotics to complete.    Patient is also here for surgical clearance for an elective laparoscopic cholecystectomy    REVISED CARDIAC RISK INDEX (RCRI)      HIGH RISK SURGERY (Intraperitoneal, intrathoracic, suprainguinal vascular) - no    HISTORY OF ISCHEMIC HEART DISEASE - no    HISTORY OF CHF  - no    HISTORY OF CVD (CEREBROVASCULAR DISEASE) - no     PRE-OPERATIVE INSULIN TREATMENT - no    PRE-OPERATIVE CREATININE >2 mg/dL - no      RCRI Score    0 - Class I   0.4%  1 - Class II  0.9%  2 - Class III 6.6 %  3 - 6 Class IV   11%     Patient already spoke to her PCP and 
   Pneumococcal 50+ years Vaccine  Completed    Hepatitis C screen  Completed    HIV screen  Completed    Hepatitis A vaccine  Aged Out    Hepatitis B vaccine  Aged Out    Hib vaccine  Aged Out    Polio vaccine  Aged Out    Meningococcal (ACWY) vaccine  Aged Out    Meningococcal B vaccine  Aged Out    Depression Screen  Discontinued    Pneumococcal 0-49 years Vaccine  Discontinued

## 2025-03-21 NOTE — PATIENT INSTRUCTIONS
Thank you for letting us take care of you today. We hope all your questions were addressed. If a question was overlooked or something else comes to mind after you return home, please contact a member of your Care Team listed below.      Your Care Team at [facility] is Team #1  Shaina Hamilton M.D. (Faculty)  Cristobal Roe M.D. (Resident)  Kiesha Brown D.O. (Resident)  Benny Wei M.D. (Resident)  Art Edwards M.D. (Resident)  Clare Kelley, [facility]  Mack Marin, [facility]  Etta Marquez, [facility]  Nissa Ying, [facility]  Deisi Lua, [facility]  Sharyn Crump, [facility]  Fidel Reina (LJ) [place],   Patricia March [facility] (Clinical Pharmacist)     Office phone number: [phone]    If you need to get in right away due to illness, please be advised we have \"Same Day\" appointments available Monday-Friday. Please call us at [phone] option #3 to schedule your \"Same Day\" appointment.

## 2025-03-24 ENCOUNTER — ANESTHESIA EVENT (OUTPATIENT)
Dept: OPERATING ROOM | Age: 65
End: 2025-03-24
Payer: COMMERCIAL

## 2025-03-25 ENCOUNTER — ANESTHESIA (OUTPATIENT)
Dept: OPERATING ROOM | Age: 65
End: 2025-03-25
Payer: COMMERCIAL

## 2025-03-25 ENCOUNTER — HOSPITAL ENCOUNTER (OUTPATIENT)
Age: 65
Setting detail: OBSERVATION
Discharge: HOME | End: 2025-03-26
Attending: SURGERY | Admitting: SURGERY
Payer: COMMERCIAL

## 2025-03-25 DIAGNOSIS — K80.10 CHRONIC CHOLECYSTITIS WITH CALCULUS: ICD-10-CM

## 2025-03-25 PROBLEM — K81.1 CHRONIC CHOLECYSTITIS: Status: ACTIVE | Noted: 2025-03-25

## 2025-03-25 LAB
ALBUMIN SERPL-MCNC: 4.2 G/DL (ref 3.5–5.2)
ALBUMIN/GLOB SERPL: 1.6 {RATIO} (ref 1–2.5)
ALP SERPL-CCNC: 106 U/L (ref 35–104)
ALT SERPL-CCNC: 119 U/L (ref 10–35)
ANION GAP SERPL CALCULATED.3IONS-SCNC: 10 MMOL/L (ref 9–16)
AST SERPL-CCNC: 100 U/L (ref 10–35)
BASOPHILS # BLD: 0.03 K/UL (ref 0–0.2)
BASOPHILS NFR BLD: 0 % (ref 0–2)
BILIRUB SERPL-MCNC: 0.2 MG/DL (ref 0–1.2)
BUN SERPL-MCNC: 22 MG/DL (ref 8–23)
CA-I BLD-SCNC: 1.14 MMOL/L (ref 1.13–1.33)
CALCIUM SERPL-MCNC: 9.2 MG/DL (ref 8.6–10.4)
CHLORIDE SERPL-SCNC: 102 MMOL/L (ref 98–107)
CO2 SERPL-SCNC: 24 MMOL/L (ref 20–31)
CREAT SERPL-MCNC: 1.1 MG/DL (ref 0.6–0.9)
EOSINOPHIL # BLD: <0.03 K/UL (ref 0–0.44)
EOSINOPHILS RELATIVE PERCENT: 0 % (ref 1–4)
ERYTHROCYTE [DISTWIDTH] IN BLOOD BY AUTOMATED COUNT: 14.2 % (ref 11.8–14.4)
GFR, ESTIMATED: 56 ML/MIN/1.73M2
GLUCOSE BLD-MCNC: 198 MG/DL (ref 65–105)
GLUCOSE BLD-MCNC: 235 MG/DL (ref 65–105)
GLUCOSE BLD-MCNC: 404 MG/DL (ref 65–105)
GLUCOSE SERPL-MCNC: 382 MG/DL (ref 74–99)
HCT VFR BLD AUTO: 32.3 % (ref 36.3–47.1)
HGB BLD-MCNC: 9.5 G/DL (ref 11.9–15.1)
IMM GRANULOCYTES # BLD AUTO: 0.07 K/UL (ref 0–0.3)
IMM GRANULOCYTES NFR BLD: 1 %
LYMPHOCYTES NFR BLD: 1.31 K/UL (ref 1.1–3.7)
LYMPHOCYTES RELATIVE PERCENT: 10 % (ref 24–43)
MAGNESIUM SERPL-MCNC: 1.5 MG/DL (ref 1.6–2.4)
MCH RBC QN AUTO: 26.4 PG (ref 25.2–33.5)
MCHC RBC AUTO-ENTMCNC: 29.4 G/DL (ref 28.4–34.8)
MCV RBC AUTO: 89.7 FL (ref 82.6–102.9)
MONOCYTES NFR BLD: 0.28 K/UL (ref 0.1–1.2)
MONOCYTES NFR BLD: 2 % (ref 3–12)
NEUTROPHILS NFR BLD: 87 % (ref 36–65)
NEUTS SEG NFR BLD: 10.89 K/UL (ref 1.5–8.1)
NRBC BLD-RTO: 0 PER 100 WBC
PHOSPHATE SERPL-MCNC: 4.4 MG/DL (ref 2.5–4.5)
PLATELET # BLD AUTO: 267 K/UL (ref 138–453)
PMV BLD AUTO: 10.2 FL (ref 8.1–13.5)
POTASSIUM SERPL-SCNC: 5.5 MMOL/L (ref 3.7–5.3)
PROT SERPL-MCNC: 6.8 G/DL (ref 6.6–8.7)
RBC # BLD AUTO: 3.6 M/UL (ref 3.95–5.11)
SODIUM SERPL-SCNC: 136 MMOL/L (ref 136–145)
WBC OTHER # BLD: 12.6 K/UL (ref 3.5–11.3)

## 2025-03-25 PROCEDURE — 6370000000 HC RX 637 (ALT 250 FOR IP)

## 2025-03-25 PROCEDURE — 3700000000 HC ANESTHESIA ATTENDED CARE: Performed by: SURGERY

## 2025-03-25 PROCEDURE — 2500000003 HC RX 250 WO HCPCS

## 2025-03-25 PROCEDURE — 3600000019 HC SURGERY ROBOT ADDTL 15MIN: Performed by: SURGERY

## 2025-03-25 PROCEDURE — 83735 ASSAY OF MAGNESIUM: CPT

## 2025-03-25 PROCEDURE — 2709999900 HC NON-CHARGEABLE SUPPLY: Performed by: SURGERY

## 2025-03-25 PROCEDURE — G0378 HOSPITAL OBSERVATION PER HR: HCPCS

## 2025-03-25 PROCEDURE — C1889 IMPLANT/INSERT DEVICE, NOC: HCPCS | Performed by: SURGERY

## 2025-03-25 PROCEDURE — 2720000010 HC SURG SUPPLY STERILE: Performed by: SURGERY

## 2025-03-25 PROCEDURE — 2500000003 HC RX 250 WO HCPCS: Performed by: SURGERY

## 2025-03-25 PROCEDURE — 84100 ASSAY OF PHOSPHORUS: CPT

## 2025-03-25 PROCEDURE — 6360000002 HC RX W HCPCS: Performed by: SURGERY

## 2025-03-25 PROCEDURE — 88304 TISSUE EXAM BY PATHOLOGIST: CPT

## 2025-03-25 PROCEDURE — 7100000000 HC PACU RECOVERY - FIRST 15 MIN: Performed by: SURGERY

## 2025-03-25 PROCEDURE — 80053 COMPREHEN METABOLIC PANEL: CPT

## 2025-03-25 PROCEDURE — 2580000003 HC RX 258: Performed by: ANESTHESIOLOGY

## 2025-03-25 PROCEDURE — 82330 ASSAY OF CALCIUM: CPT

## 2025-03-25 PROCEDURE — 2580000003 HC RX 258

## 2025-03-25 PROCEDURE — 7100000001 HC PACU RECOVERY - ADDTL 15 MIN: Performed by: SURGERY

## 2025-03-25 PROCEDURE — 6360000002 HC RX W HCPCS

## 2025-03-25 PROCEDURE — 82947 ASSAY GLUCOSE BLOOD QUANT: CPT

## 2025-03-25 PROCEDURE — 6360000002 HC RX W HCPCS: Performed by: ANESTHESIOLOGY

## 2025-03-25 PROCEDURE — 85025 COMPLETE CBC W/AUTO DIFF WBC: CPT

## 2025-03-25 PROCEDURE — S2900 ROBOTIC SURGICAL SYSTEM: HCPCS | Performed by: SURGERY

## 2025-03-25 PROCEDURE — 6370000000 HC RX 637 (ALT 250 FOR IP): Performed by: ANESTHESIOLOGY

## 2025-03-25 PROCEDURE — 3600000009 HC SURGERY ROBOT BASE: Performed by: SURGERY

## 2025-03-25 PROCEDURE — 47562 LAPAROSCOPIC CHOLECYSTECTOMY: CPT | Performed by: SURGERY

## 2025-03-25 PROCEDURE — 3700000001 HC ADD 15 MINUTES (ANESTHESIA): Performed by: SURGERY

## 2025-03-25 DEVICE — HEMOLOK L 6 CLIPS/CART
Type: IMPLANTABLE DEVICE | Site: GALLBLADDER | Status: FUNCTIONAL
Brand: WECK

## 2025-03-25 RX ORDER — NALOXONE HYDROCHLORIDE 0.4 MG/ML
INJECTION, SOLUTION INTRAMUSCULAR; INTRAVENOUS; SUBCUTANEOUS PRN
Status: DISCONTINUED | OUTPATIENT
Start: 2025-03-25 | End: 2025-03-25 | Stop reason: HOSPADM

## 2025-03-25 RX ORDER — PROMETHAZINE HYDROCHLORIDE 25 MG/1
25 TABLET ORAL EVERY 6 HOURS PRN
Qty: 30 TABLET | Refills: 0 | Status: SHIPPED | OUTPATIENT
Start: 2025-03-25

## 2025-03-25 RX ORDER — SODIUM CHLORIDE 0.9 % (FLUSH) 0.9 %
5-40 SYRINGE (ML) INJECTION PRN
Status: DISCONTINUED | OUTPATIENT
Start: 2025-03-25 | End: 2025-03-26 | Stop reason: HOSPADM

## 2025-03-25 RX ORDER — SODIUM CHLORIDE 9 MG/ML
INJECTION, SOLUTION INTRAVENOUS PRN
Status: DISCONTINUED | OUTPATIENT
Start: 2025-03-25 | End: 2025-03-25 | Stop reason: HOSPADM

## 2025-03-25 RX ORDER — ONDANSETRON 4 MG/1
4 TABLET, ORALLY DISINTEGRATING ORAL EVERY 8 HOURS PRN
Status: DISCONTINUED | OUTPATIENT
Start: 2025-03-25 | End: 2025-03-26 | Stop reason: HOSPADM

## 2025-03-25 RX ORDER — INSULIN LISPRO 100 [IU]/ML
0-16 INJECTION, SOLUTION INTRAVENOUS; SUBCUTANEOUS
Status: DISCONTINUED | OUTPATIENT
Start: 2025-03-25 | End: 2025-03-26 | Stop reason: HOSPADM

## 2025-03-25 RX ORDER — PROPOFOL 10 MG/ML
INJECTION, EMULSION INTRAVENOUS
Status: DISCONTINUED | OUTPATIENT
Start: 2025-03-25 | End: 2025-03-25 | Stop reason: SDUPTHER

## 2025-03-25 RX ORDER — ACETAMINOPHEN 500 MG
1000 TABLET ORAL EVERY 8 HOURS SCHEDULED
Status: DISCONTINUED | OUTPATIENT
Start: 2025-03-25 | End: 2025-03-26 | Stop reason: HOSPADM

## 2025-03-25 RX ORDER — SODIUM CHLORIDE 9 MG/ML
INJECTION, SOLUTION INTRAVENOUS PRN
Status: DISCONTINUED | OUTPATIENT
Start: 2025-03-25 | End: 2025-03-26 | Stop reason: HOSPADM

## 2025-03-25 RX ORDER — HYDROCODONE BITARTRATE AND ACETAMINOPHEN 5; 325 MG/1; MG/1
1 TABLET ORAL ONCE
Status: COMPLETED | OUTPATIENT
Start: 2025-03-25 | End: 2025-03-25

## 2025-03-25 RX ORDER — SODIUM CHLORIDE, SODIUM LACTATE, POTASSIUM CHLORIDE, CALCIUM CHLORIDE 600; 310; 30; 20 MG/100ML; MG/100ML; MG/100ML; MG/100ML
INJECTION, SOLUTION INTRAVENOUS CONTINUOUS
Status: DISCONTINUED | OUTPATIENT
Start: 2025-03-25 | End: 2025-03-25 | Stop reason: HOSPADM

## 2025-03-25 RX ORDER — IPRATROPIUM BROMIDE AND ALBUTEROL SULFATE 2.5; .5 MG/3ML; MG/3ML
1 SOLUTION RESPIRATORY (INHALATION)
Status: DISCONTINUED | OUTPATIENT
Start: 2025-03-25 | End: 2025-03-25 | Stop reason: HOSPADM

## 2025-03-25 RX ORDER — INDOCYANINE GREEN AND WATER 25 MG
5 KIT INJECTION ONCE
Status: COMPLETED | OUTPATIENT
Start: 2025-03-25 | End: 2025-03-25

## 2025-03-25 RX ORDER — LIDOCAINE HYDROCHLORIDE 10 MG/ML
INJECTION, SOLUTION EPIDURAL; INFILTRATION; INTRACAUDAL; PERINEURAL
Status: DISCONTINUED | OUTPATIENT
Start: 2025-03-25 | End: 2025-03-25 | Stop reason: SDUPTHER

## 2025-03-25 RX ORDER — ENOXAPARIN SODIUM 100 MG/ML
40 INJECTION SUBCUTANEOUS DAILY
Status: DISCONTINUED | OUTPATIENT
Start: 2025-03-25 | End: 2025-03-25

## 2025-03-25 RX ORDER — ROCURONIUM BROMIDE 10 MG/ML
INJECTION, SOLUTION INTRAVENOUS
Status: DISCONTINUED | OUTPATIENT
Start: 2025-03-25 | End: 2025-03-25 | Stop reason: SDUPTHER

## 2025-03-25 RX ORDER — ONDANSETRON 2 MG/ML
4 INJECTION INTRAMUSCULAR; INTRAVENOUS EVERY 6 HOURS PRN
Status: DISCONTINUED | OUTPATIENT
Start: 2025-03-25 | End: 2025-03-26 | Stop reason: HOSPADM

## 2025-03-25 RX ORDER — OXYCODONE AND ACETAMINOPHEN 5; 325 MG/1; MG/1
1 TABLET ORAL EVERY 4 HOURS PRN
Refills: 0 | Status: DISCONTINUED | OUTPATIENT
Start: 2025-03-25 | End: 2025-03-26

## 2025-03-25 RX ORDER — GLUCAGON 1 MG/ML
1 KIT INJECTION PRN
Status: DISCONTINUED | OUTPATIENT
Start: 2025-03-25 | End: 2025-03-26 | Stop reason: HOSPADM

## 2025-03-25 RX ORDER — POLYETHYLENE GLYCOL 3350 17 G/17G
17 POWDER, FOR SOLUTION ORAL DAILY PRN
Status: DISCONTINUED | OUTPATIENT
Start: 2025-03-25 | End: 2025-03-26 | Stop reason: HOSPADM

## 2025-03-25 RX ORDER — LABETALOL HYDROCHLORIDE 5 MG/ML
10 INJECTION, SOLUTION INTRAVENOUS
Status: DISCONTINUED | OUTPATIENT
Start: 2025-03-25 | End: 2025-03-25 | Stop reason: HOSPADM

## 2025-03-25 RX ORDER — PROCHLORPERAZINE EDISYLATE 5 MG/ML
5 INJECTION INTRAMUSCULAR; INTRAVENOUS
Status: COMPLETED | OUTPATIENT
Start: 2025-03-25 | End: 2025-03-25

## 2025-03-25 RX ORDER — DIPHENHYDRAMINE HYDROCHLORIDE 50 MG/ML
12.5 INJECTION, SOLUTION INTRAMUSCULAR; INTRAVENOUS
Status: DISCONTINUED | OUTPATIENT
Start: 2025-03-25 | End: 2025-03-25 | Stop reason: HOSPADM

## 2025-03-25 RX ORDER — BUPIVACAINE HYDROCHLORIDE 5 MG/ML
INJECTION, SOLUTION PERINEURAL PRN
Status: DISCONTINUED | OUTPATIENT
Start: 2025-03-25 | End: 2025-03-25 | Stop reason: HOSPADM

## 2025-03-25 RX ORDER — SODIUM CHLORIDE 0.9 % (FLUSH) 0.9 %
5-40 SYRINGE (ML) INJECTION EVERY 12 HOURS SCHEDULED
Status: DISCONTINUED | OUTPATIENT
Start: 2025-03-25 | End: 2025-03-25 | Stop reason: HOSPADM

## 2025-03-25 RX ORDER — HYDROCODONE BITARTRATE AND ACETAMINOPHEN 5; 325 MG/1; MG/1
1 TABLET ORAL EVERY 6 HOURS PRN
Qty: 28 TABLET | Refills: 0 | Status: SHIPPED | OUTPATIENT
Start: 2025-03-25 | End: 2025-04-01

## 2025-03-25 RX ORDER — SODIUM CHLORIDE 0.9 % (FLUSH) 0.9 %
5-40 SYRINGE (ML) INJECTION PRN
Status: DISCONTINUED | OUTPATIENT
Start: 2025-03-25 | End: 2025-03-25 | Stop reason: HOSPADM

## 2025-03-25 RX ORDER — FENTANYL CITRATE 50 UG/ML
INJECTION, SOLUTION INTRAMUSCULAR; INTRAVENOUS
Status: DISCONTINUED | OUTPATIENT
Start: 2025-03-25 | End: 2025-03-25 | Stop reason: SDUPTHER

## 2025-03-25 RX ORDER — POTASSIUM CHLORIDE 1500 MG/1
40 TABLET, EXTENDED RELEASE ORAL PRN
Status: DISCONTINUED | OUTPATIENT
Start: 2025-03-25 | End: 2025-03-26 | Stop reason: HOSPADM

## 2025-03-25 RX ORDER — SODIUM CHLORIDE, SODIUM LACTATE, POTASSIUM CHLORIDE, CALCIUM CHLORIDE 600; 310; 30; 20 MG/100ML; MG/100ML; MG/100ML; MG/100ML
INJECTION, SOLUTION INTRAVENOUS CONTINUOUS
Status: DISCONTINUED | OUTPATIENT
Start: 2025-03-25 | End: 2025-03-25

## 2025-03-25 RX ORDER — MAGNESIUM SULFATE IN WATER 40 MG/ML
2000 INJECTION, SOLUTION INTRAVENOUS PRN
Status: DISCONTINUED | OUTPATIENT
Start: 2025-03-25 | End: 2025-03-26 | Stop reason: HOSPADM

## 2025-03-25 RX ORDER — HYDRALAZINE HYDROCHLORIDE 20 MG/ML
10 INJECTION INTRAMUSCULAR; INTRAVENOUS
Status: DISCONTINUED | OUTPATIENT
Start: 2025-03-25 | End: 2025-03-25 | Stop reason: HOSPADM

## 2025-03-25 RX ORDER — SODIUM CHLORIDE, SODIUM LACTATE, POTASSIUM CHLORIDE, CALCIUM CHLORIDE 600; 310; 30; 20 MG/100ML; MG/100ML; MG/100ML; MG/100ML
INJECTION, SOLUTION INTRAVENOUS
Status: DISCONTINUED | OUTPATIENT
Start: 2025-03-25 | End: 2025-03-25 | Stop reason: SDUPTHER

## 2025-03-25 RX ORDER — MAGNESIUM HYDROXIDE 1200 MG/15ML
LIQUID ORAL CONTINUOUS PRN
Status: DISCONTINUED | OUTPATIENT
Start: 2025-03-25 | End: 2025-03-25 | Stop reason: HOSPADM

## 2025-03-25 RX ORDER — HEPARIN SODIUM 5000 [USP'U]/ML
5000 INJECTION, SOLUTION INTRAVENOUS; SUBCUTANEOUS ONCE
Status: COMPLETED | OUTPATIENT
Start: 2025-03-25 | End: 2025-03-25

## 2025-03-25 RX ORDER — ACETAMINOPHEN 325 MG/1
650 TABLET ORAL EVERY 6 HOURS PRN
Status: DISCONTINUED | OUTPATIENT
Start: 2025-03-25 | End: 2025-03-25

## 2025-03-25 RX ORDER — METHOCARBAMOL 750 MG/1
750 TABLET, FILM COATED ORAL EVERY 6 HOURS
Status: DISCONTINUED | OUTPATIENT
Start: 2025-03-25 | End: 2025-03-26 | Stop reason: HOSPADM

## 2025-03-25 RX ORDER — ACETAMINOPHEN 650 MG/1
650 SUPPOSITORY RECTAL EVERY 6 HOURS PRN
Status: DISCONTINUED | OUTPATIENT
Start: 2025-03-25 | End: 2025-03-25

## 2025-03-25 RX ORDER — MAGNESIUM SULFATE 1 G/100ML
1000 INJECTION INTRAVENOUS ONCE
Status: COMPLETED | OUTPATIENT
Start: 2025-03-25 | End: 2025-03-26

## 2025-03-25 RX ORDER — POTASSIUM CHLORIDE 7.45 MG/ML
10 INJECTION INTRAVENOUS PRN
Status: DISCONTINUED | OUTPATIENT
Start: 2025-03-25 | End: 2025-03-26 | Stop reason: HOSPADM

## 2025-03-25 RX ORDER — SODIUM CHLORIDE 0.9 % (FLUSH) 0.9 %
5-40 SYRINGE (ML) INJECTION EVERY 12 HOURS SCHEDULED
Status: DISCONTINUED | OUTPATIENT
Start: 2025-03-25 | End: 2025-03-26 | Stop reason: HOSPADM

## 2025-03-25 RX ORDER — DEXTROSE MONOHYDRATE 100 MG/ML
INJECTION, SOLUTION INTRAVENOUS CONTINUOUS PRN
Status: DISCONTINUED | OUTPATIENT
Start: 2025-03-25 | End: 2025-03-26 | Stop reason: HOSPADM

## 2025-03-25 RX ORDER — DEXAMETHASONE SODIUM PHOSPHATE 10 MG/ML
INJECTION, SOLUTION INTRA-ARTICULAR; INTRALESIONAL; INTRAMUSCULAR; INTRAVENOUS; SOFT TISSUE
Status: DISCONTINUED | OUTPATIENT
Start: 2025-03-25 | End: 2025-03-25 | Stop reason: SDUPTHER

## 2025-03-25 RX ORDER — OXYCODONE AND ACETAMINOPHEN 5; 325 MG/1; MG/1
1 TABLET ORAL EVERY 6 HOURS PRN
Qty: 28 TABLET | Refills: 0 | Status: SHIPPED | OUTPATIENT
Start: 2025-03-25 | End: 2025-03-25 | Stop reason: HOSPADM

## 2025-03-25 RX ORDER — GABAPENTIN 300 MG/1
300 CAPSULE ORAL EVERY 8 HOURS
Status: DISCONTINUED | OUTPATIENT
Start: 2025-03-25 | End: 2025-03-26 | Stop reason: HOSPADM

## 2025-03-25 RX ORDER — ENOXAPARIN SODIUM 100 MG/ML
40 INJECTION SUBCUTANEOUS DAILY
Status: DISCONTINUED | OUTPATIENT
Start: 2025-03-25 | End: 2025-03-26 | Stop reason: HOSPADM

## 2025-03-25 RX ADMIN — SODIUM CHLORIDE, POTASSIUM CHLORIDE, SODIUM LACTATE AND CALCIUM CHLORIDE: 600; 310; 30; 20 INJECTION, SOLUTION INTRAVENOUS at 12:44

## 2025-03-25 RX ADMIN — GABAPENTIN 300 MG: 300 CAPSULE ORAL at 22:41

## 2025-03-25 RX ADMIN — LIDOCAINE HYDROCHLORIDE 50 MG: 10 INJECTION, SOLUTION EPIDURAL; INFILTRATION; INTRACAUDAL; PERINEURAL at 12:59

## 2025-03-25 RX ADMIN — INDOCYANINE GREEN AND WATER 5 MG: KIT at 12:35

## 2025-03-25 RX ADMIN — HYDROMORPHONE HYDROCHLORIDE 0.5 MG: 1 INJECTION, SOLUTION INTRAMUSCULAR; INTRAVENOUS; SUBCUTANEOUS at 14:44

## 2025-03-25 RX ADMIN — ROCURONIUM BROMIDE 100 MG: 50 INJECTION INTRAVENOUS at 12:59

## 2025-03-25 RX ADMIN — HYDROMORPHONE HYDROCHLORIDE 0.5 MG: 1 INJECTION, SOLUTION INTRAMUSCULAR; INTRAVENOUS; SUBCUTANEOUS at 13:20

## 2025-03-25 RX ADMIN — MAGNESIUM SULFATE HEPTAHYDRATE 1000 MG: 1 INJECTION, SOLUTION INTRAVENOUS at 22:44

## 2025-03-25 RX ADMIN — PHENYLEPHRINE HYDROCHLORIDE 100 MCG: 10 INJECTION INTRAVENOUS at 13:39

## 2025-03-25 RX ADMIN — SUGAMMADEX 300 MG: 100 INJECTION, SOLUTION INTRAVENOUS at 14:17

## 2025-03-25 RX ADMIN — HYDROMORPHONE HYDROCHLORIDE 0.5 MG: 1 INJECTION, SOLUTION INTRAMUSCULAR; INTRAVENOUS; SUBCUTANEOUS at 15:32

## 2025-03-25 RX ADMIN — HYDROCODONE BITARTRATE AND ACETAMINOPHEN 1 TABLET: 5; 325 TABLET ORAL at 16:18

## 2025-03-25 RX ADMIN — FENTANYL CITRATE 50 MCG: 50 INJECTION, SOLUTION INTRAMUSCULAR; INTRAVENOUS at 12:59

## 2025-03-25 RX ADMIN — SODIUM CHLORIDE, SODIUM LACTATE, POTASSIUM CHLORIDE, AND CALCIUM CHLORIDE: .6; .31; .03; .02 INJECTION, SOLUTION INTRAVENOUS at 09:24

## 2025-03-25 RX ADMIN — DEXAMETHASONE SODIUM PHOSPHATE 10 MG: 10 INJECTION INTRAMUSCULAR; INTRAVENOUS at 13:06

## 2025-03-25 RX ADMIN — PROCHLORPERAZINE EDISYLATE 5 MG: 5 INJECTION INTRAMUSCULAR; INTRAVENOUS at 18:09

## 2025-03-25 RX ADMIN — METHOCARBAMOL 750 MG: 750 TABLET ORAL at 22:41

## 2025-03-25 RX ADMIN — ACETAMINOPHEN 1000 MG: 500 TABLET ORAL at 22:42

## 2025-03-25 RX ADMIN — HYDROMORPHONE HYDROCHLORIDE 0.5 MG: 1 INJECTION, SOLUTION INTRAMUSCULAR; INTRAVENOUS; SUBCUTANEOUS at 14:52

## 2025-03-25 RX ADMIN — Medication 2000 MG: at 13:00

## 2025-03-25 RX ADMIN — HEPARIN SODIUM 5000 UNITS: 5000 INJECTION INTRAVENOUS; SUBCUTANEOUS at 09:22

## 2025-03-25 RX ADMIN — FENTANYL CITRATE 25 MCG: 50 INJECTION, SOLUTION INTRAMUSCULAR; INTRAVENOUS at 13:28

## 2025-03-25 RX ADMIN — FENTANYL CITRATE 25 MCG: 50 INJECTION, SOLUTION INTRAMUSCULAR; INTRAVENOUS at 13:22

## 2025-03-25 RX ADMIN — INSULIN LISPRO 16 UNITS: 100 INJECTION, SOLUTION INTRAVENOUS; SUBCUTANEOUS at 22:42

## 2025-03-25 RX ADMIN — SODIUM CHLORIDE, PRESERVATIVE FREE 10 ML: 5 INJECTION INTRAVENOUS at 22:42

## 2025-03-25 RX ADMIN — PROPOFOL 200 MG: 10 INJECTION, EMULSION INTRAVENOUS at 12:59

## 2025-03-25 ASSESSMENT — PAIN DESCRIPTION - DESCRIPTORS
DESCRIPTORS: ACHING;DISCOMFORT
DESCRIPTORS: ACHING
DESCRIPTORS: ACHING;DISCOMFORT

## 2025-03-25 ASSESSMENT — PAIN DESCRIPTION - LOCATION
LOCATION: ABDOMEN

## 2025-03-25 ASSESSMENT — PAIN DESCRIPTION - PAIN TYPE
TYPE: SURGICAL PAIN
TYPE: SURGICAL PAIN

## 2025-03-25 ASSESSMENT — PAIN DESCRIPTION - ORIENTATION
ORIENTATION: RIGHT;LEFT;ANTERIOR
ORIENTATION: ANTERIOR;RIGHT;LEFT

## 2025-03-25 ASSESSMENT — PAIN SCALES - GENERAL
PAINLEVEL_OUTOF10: 8
PAINLEVEL_OUTOF10: 7
PAINLEVEL_OUTOF10: 8
PAINLEVEL_OUTOF10: 5
PAINLEVEL_OUTOF10: 10
PAINLEVEL_OUTOF10: 9
PAINLEVEL_OUTOF10: 7
PAINLEVEL_OUTOF10: 8

## 2025-03-25 NOTE — DISCHARGE INSTRUCTIONS
Discharge Instructions for Bariatric Surgery          Home Care     It is important to keep the incisions clean and dry to promote healing.   Shower with soap and rinse and dry thoroughly.  If incisions are oozing, you may cover with clean gauze.  Use the incentive spirometer every couple of hours. This is to make sure you are breathing deeply and keeping the air sacs within your lungs as open as possible to prevent respiratory problems.      Diet    Regular    Physical Activity    When home, we recommend that you take frequent walks, as tolerated, to prevent complications and increase your endurance.   Ask your doctor when you will be able to return to work. You may need to wait 2-6 weeks.    Do not drive unless you are no longer using pain medications  Do not lift anything over ten pounds for 4 weeks.      Medications    See list    Lifestyle Changes    Changing your diet and level of activity are the biggest lifestyle changes associated with your success. Be aware that you may have emotional ups and downs after this surgery.           Follow-up   Follow up with your primary care physician in one week.   Follow up with Dr. Glaser in 1 week. If you don't already have a scheduled  appointment, please call the office at [phone].    Call Your Doctor If Any of the Following Occurs   Monitor your recovery once you leave the hospital. If any of the following occur, call your doctor:   Signs of infection, including fever above 100.5F    Redness, swelling, increasing pain, excessive bleeding, or any discharge from the incision site   Persistent nausea and/or vomiting   Pain that you can't control with the medications you've been given   Shortness of breath and/or chest pain   Tachycardia (racing heart sensation) unrelieved by rest  Pain, redness and/or swelling in your feet or legs    Sudden onset of severe left shoulder pain or severe abdominal pain  Any symptoms that are causing you concern   In case of an

## 2025-03-25 NOTE — H&P
tablet by mouth nightly 7/2/24  Yes Judith Boyer MD   magnesium oxide (MAG-OX) 400 (240 Mg) MG tablet Take 1 tablet by mouth 3 times daily 7/2/24  Yes Judith Boyer MD   venlafaxine (EFFEXOR XR) 75 MG extended release capsule Take 1 capsule by mouth daily 4/30/24  Yes Shaina Hamilton MD   atorvastatin (LIPITOR) 40 MG tablet take 1 tablet by mouth at bedtime 4/1/24  Yes Shania Hamilton MD   ASPIRIN LOW DOSE 81 MG EC tablet TAKE 1 TABLET BY MOUTH ONE TIME A DAY 11/8/22  Yes Tesfaye Winslow MD   dulaglutide (TRULICITY) 0.75 MG/0.5ML SOAJ SC injection Inject 0.5 mLs into the skin every 7 days  Patient not taking: Reported on 3/25/2025 3/18/25   Shaina Hamilton MD   fluticasone (FLONASE) 50 MCG/ACT nasal spray 1 spray by Each Nostril route daily  Patient not taking: Reported on 3/25/2025 3/14/25   Cristobal Roe MD   loratadine (CLARITIN) 10 MG tablet Take 1 tablet by mouth daily for 14 days  Patient not taking: Reported on 3/25/2025 3/14/25 3/28/25  Cristobal Roe MD   rimegepant sulfate (NURTEC) 75 MG TBDP Take 75 mg by mouth every other day  Patient not taking: Reported on 3/25/2025 3/13/25   Annel Cavazos MD   Continuous Glucose Sensor (FREESTYLE JEREMY 3 PLUS SENSOR) MISC Apply new sensor every 15 days 2/4/25   Shaina Hamilton MD   latanoprost (XALATAN) 0.005 % ophthalmic solution INSTILL 1 DROP IN EACH EYE AT BEDTIME 10/23/24   Gerardo Medina MD   Continuous Glucose Sensor (FREESTYLE JEREMY 3 SENSOR) MISC Apply new sensor to back of arm every 14 days. 7/3/24   Judith Boyer MD   miconazole (MICOTIN) 2 % powder Apply topically 2 times daily. 7/2/24   Judith Boyer MD   melatonin 3 MG TABS tablet Take 1 tablet by mouth nightly as needed (insomnia)  Patient not taking: Reported on 3/25/2025 7/2/24   Judith Boyer MD   blood glucose test strips (PRODIGY NO CODING BLOOD GLUC) strip Use to test once daily and as needed as directed by provider. Please dispense Prodigy brand.  SELF, APRN - CNP   Electronically signed 3/25/2025 at 9:51 AM

## 2025-03-25 NOTE — ANESTHESIA PRE PROCEDURE
Department of Anesthesiology  Preprocedure Note       Name:  Kavya De Santiago   Age:  65 y.o.  :  1960                                          MRN:  [id number]         Date:  3/25/2025      Surgeon: Surgeon(s):  Cas Glaser DO    Procedure: Procedure(s):  ROBOTIC LAPAROSCOPIC CHOLECYSTECTOMY, POSSIBLE OPEN    Medications prior to admission:   Prior to Admission medications    Medication Sig Start Date End Date Taking? Authorizing Provider   propranolol (INDERAL LA) 80 MG extended release capsule Take one cap daily (reason: tremors) 3/13/25  Yes Annel Cavazos MD   rOPINIRole (REQUIP) 2 MG tablet Take 2 tablets by mouth nightly 3/13/25  Yes Annel Cavazos MD   tiZANidine (ZANAFLEX) 4 MG tablet Take one tab nightly 3/13/25  Yes Annel Cavazos MD   metFORMIN (GLUCOPHAGE) 1000 MG tablet TAKE 1 TABLET BY MOUTH 2 TIMES A DAY WITH MEALS 3/3/25  Yes Shaina Hamilton MD   omeprazole (PRILOSEC) 40 MG delayed release capsule Take 1 capsule by mouth every morning (before breakfast) 25  Yes Shaina Hamilton MD   lisinopril (PRINIVIL;ZESTRIL) 20 MG tablet Take 1 tablet by mouth daily 24  Yes Cristobal Roe MD   hydrALAZINE (APRESOLINE) 50 MG tablet Take 1 tablet by mouth every 8 hours 24  Yes Cristobal Roe MD   busPIRone (BUSPAR) 10 MG tablet Take 1 tablet by mouth 3 times daily 12/5/24 3/25/25 Yes Cristobal Roe MD   pregabalin (LYRICA) 75 MG capsule Take 100 mg by mouth 3 times daily. Will get refill at 3/13 appt. Has not taken for \"several months\"   Yes ProviderGerardo MD   glipiZIDE (GLUCOTROL) 5 MG tablet Take 1 tablet by mouth 2 times daily (before meals) 24  Yes Judith Boyer MD   NIFEdipine (ADALAT CC) 30 MG extended release tablet Take 1 tablet by mouth nightly 24  Yes Judith Boyer MD   magnesium oxide (MAG-OX) 400 (240 Mg) MG tablet Take 1 tablet by mouth 3 times daily 24  Yes Judith Boyer MD   venlafaxine (EFFEXOR  RN to triage

## 2025-03-25 NOTE — OP NOTE
Operative Note      Patient: Kavya De Santiago  YOB: 1960  MRN: [id number]    Date of Procedure: 3/25/2025    Pre-Op Diagnosis Codes:      * Chronic cholecystitis with calculus [K80.10]    Post-Op Diagnosis: Same       Procedure(s):  ROBOTIC LAPAROSCOPIC CHOLECYSTECTOMY, POSSIBLE OPEN    Surgeon(s):  Cas Glaser DO    Assistant:   * No surgical staff found *    Anesthesia: General    Estimated Blood Loss (mL): Minimal    Complications: None    Specimens:   ID Type Source Tests Collected by Time Destination   A : GALLBLADDER AND CONTENTS Tissue Gallbladder SURGICAL PATHOLOGY Cas Glaser DO 3/25/2025 1322        Implants:  Implant Name Type Inv. Item Serial No.  Lot No. LRB No. Used Action   CLIP INT L POLYMER TAL LIG HEM O TAL (6EA/PK) - [id number]  CLIP INT L POLYMER TAL LIG HEM O TAL (6EA/PK)  [employer]- [id number] N/A 1 Implanted   CLIP INT L POLYMER TAL LIG HEM O TAL (6EA/PK) - [id number]  CLIP INT L POLYMER TAL LIG HEM O TAL (6EA/PK)  [employer]- [id number] N/A 1 Implanted         Drains: * No LDAs found *    Findings:  Infection Present At Time Of Surgery (PATOS) (choose all levels that have infection present):  No infection present  Other Findings: hemostatic liver bed  Counts reported to me as correct  ICG confirmed anatomy  Critical view obtained       Detailed Description of Procedure:   Operative narrative: The risks and benefits of the procedure were explained in detail to the patient who agreed and consented with the procedure.  Patient was taken to the operative suite and administered general anesthetic by the anesthetic team.       Using an optical access trocar and a 12 mm port, the peritoneal cavity was entered under direct visualization from John's point.  Pneumoperitoneum was established at that time without complication.  Next, three 8 mm ports were then placed in standard fashion for robotic assisted cholecystectomy.  All ports

## 2025-03-25 NOTE — ANESTHESIA POSTPROCEDURE EVALUATION
Department of Anesthesiology  Postprocedure Note    Patient: Kavya De Santiago  MRN: [id number]  YOB: 1960  Date of evaluation: 3/25/2025    Procedure Summary       Date: 03/25/25 Room / Location: [address]    Anesthesia Start: 1245 Anesthesia Stop: 1436    Procedure: ROBOTIC LAPAROSCOPIC CHOLECYSTECTOMY, POSSIBLE OPEN Diagnosis:       Chronic cholecystitis with calculus      (Chronic cholecystitis with calculus [K80.10])    Surgeons: Cas Glaser DO Responsible Provider: Jass Colon MD    Anesthesia Type: general ASA Status: 3            Anesthesia Type: No value filed.    Elise Phase I: Elise Score: 9    Elise Phase II: Elise Score: 9  POST-OP ANESTHESIA NOTE       BP (!) 160/89   Pulse 63   Temp 97.9 °F (36.6 °C) (Temporal)   Resp (!) 9   SpO2 97%    Pain Assessment: 0-10  Pain Level: 8       Anesthesia Post Evaluation    Patient location during evaluation: PACU  Patient participation: complete - patient participated  Level of consciousness: awake  Pain score: 8  Airway patency: patent  Nausea & Vomiting: no vomiting and no nausea  Cardiovascular status: hemodynamically stable  Respiratory status: acceptable  Hydration status: stable  Pain management: adequate        No notable events documented.

## 2025-03-25 NOTE — PROGRESS NOTES
The following labs labeled with pt sticker and tubed to lab:     [] Blue     [x] Lavender   [] on ice  [x] Green/yellow  [x] Green/black x 2 [] on ice  [] Yellow  [] Red  [] Pink      [] COVID-19 swab    [] Rapid  [] PCR  [] Flu Swab  [] Strep Swab  [] Peds Viral Panel     [] Urine Sample  [] Pelvic Cultures  [] Blood Cultures   [] Wound Cultures

## 2025-03-26 VITALS
HEIGHT: 61 IN | DIASTOLIC BLOOD PRESSURE: 89 MMHG | WEIGHT: 203.71 LBS | HEART RATE: 60 BPM | SYSTOLIC BLOOD PRESSURE: 155 MMHG | RESPIRATION RATE: 15 BRPM | TEMPERATURE: 98.1 F | OXYGEN SATURATION: 98 % | BODY MASS INDEX: 38.46 KG/M2

## 2025-03-26 LAB
ALBUMIN SERPL-MCNC: 3.5 G/DL (ref 3.5–5.2)
ALBUMIN/GLOB SERPL: 1.5 {RATIO} (ref 1–2.5)
ALP SERPL-CCNC: 89 U/L (ref 35–104)
ALT SERPL-CCNC: 80 U/L (ref 10–35)
ANION GAP SERPL CALCULATED.3IONS-SCNC: 10 MMOL/L (ref 9–16)
AST SERPL-CCNC: 58 U/L (ref 10–35)
BASOPHILS # BLD: <0.03 K/UL (ref 0–0.2)
BASOPHILS NFR BLD: 0 % (ref 0–2)
BILIRUB SERPL-MCNC: 0.2 MG/DL (ref 0–1.2)
BUN SERPL-MCNC: 19 MG/DL (ref 8–23)
CALCIUM SERPL-MCNC: 8.7 MG/DL (ref 8.6–10.4)
CHLORIDE SERPL-SCNC: 105 MMOL/L (ref 98–107)
CO2 SERPL-SCNC: 23 MMOL/L (ref 20–31)
CREAT SERPL-MCNC: 1.1 MG/DL (ref 0.6–0.9)
EOSINOPHIL # BLD: <0.03 K/UL (ref 0–0.44)
EOSINOPHILS RELATIVE PERCENT: 0 % (ref 1–4)
ERYTHROCYTE [DISTWIDTH] IN BLOOD BY AUTOMATED COUNT: 14.1 % (ref 11.8–14.4)
EST. AVERAGE GLUCOSE BLD GHB EST-MCNC: 226 MG/DL
GFR, ESTIMATED: 56 ML/MIN/1.73M2
GLUCOSE BLD-MCNC: 202 MG/DL (ref 65–105)
GLUCOSE BLD-MCNC: 226 MG/DL (ref 65–105)
GLUCOSE BLD-MCNC: 261 MG/DL (ref 65–105)
GLUCOSE SERPL-MCNC: 245 MG/DL (ref 74–99)
HBA1C MFR BLD: 9.5 % (ref 4–6)
HCT VFR BLD AUTO: 29.1 % (ref 36.3–47.1)
HGB BLD-MCNC: 8.8 G/DL (ref 11.9–15.1)
IMM GRANULOCYTES # BLD AUTO: 0.03 K/UL (ref 0–0.3)
IMM GRANULOCYTES NFR BLD: 0 %
LYMPHOCYTES NFR BLD: 1.49 K/UL (ref 1.1–3.7)
LYMPHOCYTES RELATIVE PERCENT: 15 % (ref 24–43)
MCH RBC QN AUTO: 26.8 PG (ref 25.2–33.5)
MCHC RBC AUTO-ENTMCNC: 30.2 G/DL (ref 28.4–34.8)
MCV RBC AUTO: 88.7 FL (ref 82.6–102.9)
MONOCYTES NFR BLD: 0.88 K/UL (ref 0.1–1.2)
MONOCYTES NFR BLD: 9 % (ref 3–12)
NEUTROPHILS NFR BLD: 76 % (ref 36–65)
NEUTS SEG NFR BLD: 7.45 K/UL (ref 1.5–8.1)
NRBC BLD-RTO: 0 PER 100 WBC
PLATELET # BLD AUTO: 300 K/UL (ref 138–453)
PMV BLD AUTO: 10.7 FL (ref 8.1–13.5)
POTASSIUM SERPL-SCNC: 4.8 MMOL/L (ref 3.7–5.3)
PROT SERPL-MCNC: 5.8 G/DL (ref 6.6–8.7)
RBC # BLD AUTO: 3.28 M/UL (ref 3.95–5.11)
SODIUM SERPL-SCNC: 138 MMOL/L (ref 136–145)
WBC OTHER # BLD: 9.9 K/UL (ref 3.5–11.3)

## 2025-03-26 PROCEDURE — 36415 COLL VENOUS BLD VENIPUNCTURE: CPT

## 2025-03-26 PROCEDURE — 2500000003 HC RX 250 WO HCPCS

## 2025-03-26 PROCEDURE — 85025 COMPLETE CBC W/AUTO DIFF WBC: CPT

## 2025-03-26 PROCEDURE — 82947 ASSAY GLUCOSE BLOOD QUANT: CPT

## 2025-03-26 PROCEDURE — 96366 THER/PROPH/DIAG IV INF ADDON: CPT

## 2025-03-26 PROCEDURE — 96375 TX/PRO/DX INJ NEW DRUG ADDON: CPT

## 2025-03-26 PROCEDURE — 6360000002 HC RX W HCPCS

## 2025-03-26 PROCEDURE — 99024 POSTOP FOLLOW-UP VISIT: CPT | Performed by: NURSE PRACTITIONER

## 2025-03-26 PROCEDURE — 96365 THER/PROPH/DIAG IV INF INIT: CPT

## 2025-03-26 PROCEDURE — 83036 HEMOGLOBIN GLYCOSYLATED A1C: CPT

## 2025-03-26 PROCEDURE — 80053 COMPREHEN METABOLIC PANEL: CPT

## 2025-03-26 PROCEDURE — 96372 THER/PROPH/DIAG INJ SC/IM: CPT

## 2025-03-26 PROCEDURE — 6370000000 HC RX 637 (ALT 250 FOR IP)

## 2025-03-26 PROCEDURE — G0378 HOSPITAL OBSERVATION PER HR: HCPCS

## 2025-03-26 RX ORDER — HYDROCODONE BITARTRATE AND ACETAMINOPHEN 5; 325 MG/1; MG/1
1 TABLET ORAL EVERY 6 HOURS PRN
Status: DISCONTINUED | OUTPATIENT
Start: 2025-03-26 | End: 2025-03-26 | Stop reason: HOSPADM

## 2025-03-26 RX ORDER — ROPINIROLE 1 MG/1
2 TABLET, FILM COATED ORAL NIGHTLY
Status: DISCONTINUED | OUTPATIENT
Start: 2025-03-26 | End: 2025-03-26 | Stop reason: HOSPADM

## 2025-03-26 RX ADMIN — ENOXAPARIN SODIUM 40 MG: 100 INJECTION SUBCUTANEOUS at 10:57

## 2025-03-26 RX ADMIN — GABAPENTIN 300 MG: 300 CAPSULE ORAL at 10:57

## 2025-03-26 RX ADMIN — ROPINIROLE HYDROCHLORIDE 2 MG: 1 TABLET, FILM COATED ORAL at 04:31

## 2025-03-26 RX ADMIN — INSULIN LISPRO 4 UNITS: 100 INJECTION, SOLUTION INTRAVENOUS; SUBCUTANEOUS at 12:25

## 2025-03-26 RX ADMIN — HYDROCODONE BITARTRATE AND ACETAMINOPHEN 1 TABLET: 5; 325 TABLET ORAL at 03:19

## 2025-03-26 RX ADMIN — ACETAMINOPHEN 1000 MG: 500 TABLET ORAL at 06:11

## 2025-03-26 RX ADMIN — METHOCARBAMOL 750 MG: 750 TABLET ORAL at 10:59

## 2025-03-26 RX ADMIN — SODIUM CHLORIDE, PRESERVATIVE FREE 10 ML: 5 INJECTION INTRAVENOUS at 11:03

## 2025-03-26 RX ADMIN — ONDANSETRON 4 MG: 2 INJECTION, SOLUTION INTRAMUSCULAR; INTRAVENOUS at 03:19

## 2025-03-26 RX ADMIN — HYDROCODONE BITARTRATE AND ACETAMINOPHEN 1 TABLET: 5; 325 TABLET ORAL at 10:57

## 2025-03-26 RX ADMIN — GABAPENTIN 300 MG: 300 CAPSULE ORAL at 06:11

## 2025-03-26 RX ADMIN — INSULIN LISPRO 4 UNITS: 100 INJECTION, SOLUTION INTRAVENOUS; SUBCUTANEOUS at 10:59

## 2025-03-26 RX ADMIN — METHOCARBAMOL 750 MG: 750 TABLET ORAL at 04:31

## 2025-03-26 RX ADMIN — ACETAMINOPHEN 1000 MG: 500 TABLET ORAL at 14:07

## 2025-03-26 ASSESSMENT — PAIN DESCRIPTION - DESCRIPTORS
DESCRIPTORS: ACHING;DISCOMFORT

## 2025-03-26 ASSESSMENT — PAIN SCALES - GENERAL
PAINLEVEL_OUTOF10: 5
PAINLEVEL_OUTOF10: 3
PAINLEVEL_OUTOF10: 7
PAINLEVEL_OUTOF10: 3
PAINLEVEL_OUTOF10: 7

## 2025-03-26 ASSESSMENT — PAIN DESCRIPTION - LOCATION
LOCATION: ABDOMEN

## 2025-03-26 ASSESSMENT — PAIN DESCRIPTION - PAIN TYPE
TYPE: SURGICAL PAIN
TYPE: SURGICAL PAIN

## 2025-03-26 ASSESSMENT — PAIN DESCRIPTION - ORIENTATION
ORIENTATION: RIGHT;LEFT;LOWER;MID;UPPER
ORIENTATION: RIGHT;LEFT;MID;UPPER;LOWER
ORIENTATION: ANTERIOR;MID;LOWER

## 2025-03-26 NOTE — PLAN OF CARE
Problem: Chronic Conditions and Co-morbidities  Goal: Patient's chronic conditions and co-morbidity symptoms are monitored and maintained or improved  3/26/2025 1601 by Fausto Morales RN  Outcome: Completed     Problem: Discharge Planning  Goal: Discharge to home or other facility with appropriate resources  3/26/2025 1601 by Fausto Morales RN  Outcome: Completed     Problem: Safety - Adult  Goal: Free from fall injury  3/26/2025 1601 by Fausto Morales RN  Outcome: Completed     Problem: Pain  Goal: Verbalizes/displays adequate comfort level or baseline comfort level  3/26/2025 1601 by Fausto Morales RN  Outcome: Completed

## 2025-03-26 NOTE — PLAN OF CARE
Problem: Chronic Conditions and Co-morbidities  Goal: Patient's chronic conditions and co-morbidity symptoms are monitored and maintained or improved  Outcome: Progressing  Flowsheets (Taken 3/25/2025 2100)  Care Plan - Patient's Chronic Conditions and Co-Morbidity Symptoms are Monitored and Maintained or Improved:   Monitor and assess patient's chronic conditions and comorbid symptoms for stability, deterioration, or improvement   Collaborate with multidisciplinary team to address chronic and comorbid conditions and prevent exacerbation or deterioration   Update acute care plan with appropriate goals if chronic or comorbid symptoms are exacerbated and prevent overall improvement and discharge     Problem: Discharge Planning  Goal: Discharge to home or other facility with appropriate resources  Outcome: Progressing  Flowsheets (Taken 3/25/2025 2100)  Discharge to home or other facility with appropriate resources:   Identify barriers to discharge with patient and caregiver   Identify discharge learning needs (meds, wound care, etc)     Problem: Safety - Adult  Goal: Free from fall injury  Outcome: Progressing  Flowsheets  Taken 3/26/2025 0242  Free From Fall Injury: Instruct family/caregiver on patient safety  Taken 3/26/2025 0239  Free From Fall Injury: Instruct family/caregiver on patient safety     Problem: Pain  Goal: Verbalizes/displays adequate comfort level or baseline comfort level  Outcome: Progressing  Flowsheets (Taken 3/25/2025 2241)  Verbalizes/displays adequate comfort level or baseline comfort level:   Encourage patient to monitor pain and request assistance   Assess pain using appropriate pain scale   Administer analgesics based on type and severity of pain and evaluate response   Implement non-pharmacological measures as appropriate and evaluate response   Consider cultural and social influences on pain and pain management   Notify Licensed Independent Practitioner if interventions unsuccessful or

## 2025-03-26 NOTE — PROGRESS NOTES
Bariatric Surgery Progress Note            PATIENT NAME: Kavya De Santiago     TODAY'S DATE: 3/26/2025, 8:56 AM      SUBJECTIVE:    Pt seen and examined at bedside.  Tolerable pain with current pain regimen. Tolerating low fat diet. She hasn't had breakfast but did have pudding and jello yesterday. Drinking well. Ambulating to the bathroom and ambulated in the leahy last night.  Denies N/V/F/C.  Voiding without difficulty. She states she been using IS.   No other complaints noted.    She is on 2L O2 per NC, satting 99%.       OBJECTIVE:   VITALS:  BP (!) 150/75   Pulse 64   Temp 98.1 °F (36.7 °C) (Oral)   Resp 14   Ht 1.549 m (5' 1\")   Wt 92.4 kg (203 lb 11.3 oz)   SpO2 98%   BMI 38.49 kg/m²      INTAKE/OUTPUT:      Intake/Output Summary (Last 24 hours) at 3/26/2025 0856  Last data filed at 3/26/2025 0600  Gross per 24 hour   Intake 1350 ml   Output 700 ml   Net 650 ml       CONSTITUTIONAL:  NAD, A&O x 3  HEENT: EOMI, moist mucous membranes  LUNGS:  normal effort with symmetric rise and fall of chest wall  CARDIOVASCULAR:  regular rate and rhythm  ABDOMEN: Soft, nondistended, appropriately TTP, port sites I/C/D.  EXTREMITIES: no rashes, lesions, edema.      Data:  CBC with Differential:    Lab Results   Component Value Date/Time    WBC 9.9 03/26/2025 08:08 AM    RBC 3.28 03/26/2025 08:08 AM    HGB 8.8 03/26/2025 08:08 AM    HCT 29.1 03/26/2025 08:08 AM     03/26/2025 08:08 AM    MCV 88.7 03/26/2025 08:08 AM    MCH 26.8 03/26/2025 08:08 AM    MCHC 30.2 03/26/2025 08:08 AM    RDW 14.1 03/26/2025 08:08 AM    LYMPHOPCT 15 03/26/2025 08:08 AM    MONOPCT 9 03/26/2025 08:08 AM    EOSPCT 0 03/26/2025 08:08 AM    BASOPCT 0 03/26/2025 08:08 AM    MONOSABS 0.88 03/26/2025 08:08 AM    LYMPHSABS 1.49 03/26/2025 08:08 AM    EOSABS <0.03 03/26/2025 08:08 AM    BASOSABS <0.03 03/26/2025 08:08 AM    DIFFTYPE NOT REPORTED 07/21/2021 10:15 AM     CMP:    Lab Results   Component Value Date/Time     03/26/2025

## 2025-03-27 ENCOUNTER — TELEPHONE (OUTPATIENT)
Dept: BARIATRICS/WEIGHT MGMT | Age: 65
End: 2025-03-27

## 2025-03-27 ENCOUNTER — TELEPHONE (OUTPATIENT)
Dept: FAMILY MEDICINE CLINIC | Age: 65
End: 2025-03-27

## 2025-03-27 PROBLEM — K80.10 CHRONIC CHOLECYSTITIS WITH CALCULUS: Status: ACTIVE | Noted: 2025-03-27

## 2025-03-27 NOTE — TELEPHONE ENCOUNTER
Patient called the office on 3/27/25 and stated that she is having severe abdominal pain.  Patient stated that the pain is severe when she tries to get up.  The pain is not hat bad when she is sitting in her lazy-boy.  Patient is taking her pain med every 6 hours.  Please advise

## 2025-03-27 NOTE — TELEPHONE ENCOUNTER
Care Transitions Initial Follow Up Call    Outreach made within 2 business days of discharge: Yes    Patient: Kavya De Santiago Patient : 1960   MRN: [id number]  Reason for Admission: Chronic cholecystitis   Discharge Date: 3/26/25       Spoke with: Patient, scheduled     Discharge department/facility: [facility] Interactive Patient Contact:  Was patient able to fill all prescriptions: Yes  Was patient instructed to bring all medications to the follow-up visit: Yes  Is patient taking all medications as directed in the discharge summary? Yes  Does patient understand their discharge instructions: Yes  Does patient have questions or concerns that need addressed prior to 7-14 day follow up office visit: no    Additional needs identified to be addressed with provider  No needs identified             Scheduled appointment with PCP within 7-14 days    Follow Up  Future Appointments   Date Time Provider Department Center   2025  1:45 PM Cas Glaser DO bariatric [facility]avez [facility]   2025  3:00 PM Matty Brown DO [facility] DEP   2025 11:30 AM Patricia March RPH [facility]   2025 10:00 AM Shaina Hamilton MD [facility] DEP       Fidel Dumont MA

## 2025-03-28 ENCOUNTER — TELEPHONE (OUTPATIENT)
Dept: FAMILY MEDICINE CLINIC | Age: 65
End: 2025-03-28

## 2025-03-28 DIAGNOSIS — F33.9 RECURRENT MAJOR DEPRESSIVE DISORDER, REMISSION STATUS UNSPECIFIED: ICD-10-CM

## 2025-03-28 LAB — SURGICAL PATHOLOGY REPORT: NORMAL

## 2025-03-28 NOTE — TELEPHONE ENCOUNTER
Medication Management Service ([facility])  [phone]     CLINICAL PHARMACY NOTE:      Phone call received from patient.  Patient shared that she had her gall bladder surgery on Tuesday.  Mentions how recovery is slower than she expected due to pain.  Patient has followed up with surgeon regarding pain.  Recommendation was for patient to take 2 tablets of Norco every 6 hours along with with Motrin 600mg three times daily. Patient reports taking ibuprofen 400mg instead of 600mg.      Reason for patient call was to let PharmD that she restarted the Trulicity 0.75mg today (Friday, 03/28/2025) and she has also applied the Clifford 3+ sensor.      PharmD follow up planned for 04/18/2025.  Patient has primary care post-hospital on 04/07/2025.  Next PCP appt is scheduled for 04/29/2025.      Patricia March, Pharm.D., BCACP  Clinical Pharmacist  [facility] Medication Management Service  [phone]  3/28/2025  4:20 PM

## 2025-03-28 NOTE — TELEPHONE ENCOUNTER
Called patient, no pain if laying flat, pain with movements.  Appears to be msk pain    Will add motrin 600 mg TID for 3 days    She will take 2 norco every 6 hours

## 2025-03-31 RX ORDER — BUSPIRONE HYDROCHLORIDE 10 MG/1
10 TABLET ORAL 3 TIMES DAILY
Qty: 270 TABLET | Refills: 0 | Status: SHIPPED | OUTPATIENT
Start: 2025-03-31 | End: 2025-06-29

## 2025-03-31 NOTE — TELEPHONE ENCOUNTER
Last visit: 03/21/2025  Last Med refill: 12/05/2024  Does patient have enough medication for 72 hours: No:     Next Visit Date:  Future Appointments   Date Time Provider Department Center   4/4/2025  1:45 PM Cas Glaser DO bariatric chavez [facility]   4/7/2025  3:00 PM Matty Brown DO [facility] DEP   4/18/2025 11:30 AM Patricia March RPH [facility] DEP   4/29/2025 10:00 AM Shaina Hamilton MD [facility] DEP       Health Maintenance   Topic Date Due    Shingles vaccine (1 of 2) Never done    Diabetic retinal exam  01/12/2017    Diabetic foot exam  12/01/2017    Respiratory Syncytial Virus (RSV) Pregnant or age 60 yrs+ (1 - Risk 60-74 years 1-dose series) Never done    Diabetic Alb to Cr ratio (uACR) test  09/22/2023    COVID-19 Vaccine (3 - 2024-25 season) 09/01/2024    Breast cancer screen  04/25/2025    Lipids  06/05/2025    A1C test (Diabetic or Prediabetic)  06/26/2025    Depression Monitoring  03/21/2026    GFR test (Diabetes, CKD 3-4, OR last GFR 15-59)  03/26/2026    DTaP/Tdap/Td vaccine (4 - Td or Tdap) 08/25/2032    Colorectal Cancer Screen  07/17/2034    Flu vaccine  Completed    Pneumococcal 50+ years Vaccine  Completed    Hepatitis C screen  Completed    HIV screen  Completed    Hepatitis A vaccine  Aged Out    Hepatitis B vaccine  Aged Out    Hib vaccine  Aged Out    Polio vaccine  Aged Out    Meningococcal (ACWY) vaccine  Aged Out    Meningococcal B vaccine  Aged Out    Depression Screen  Discontinued    Pneumococcal 0-49 years Vaccine  Discontinued       Hemoglobin A1C (%)   Date Value   03/26/2025 9.5 (H)   02/04/2025 9.4   08/06/2024 8.1             ( goal A1C is < 7)   No components found for: \"LABMICR\"  No components found for: \"LDLCHOLESTEROL\", \"LDLCALC\"    (goal LDL is <100)   AST (U/L)   Date Value   03/26/2025 58 (H)     ALT (U/L)   Date Value   03/26/2025 80 (H)     BUN (mg/dL)   Date Value   03/26/2025 19     BP Readings from Last 3 Encounters:   03/26/25 (!)

## 2025-04-04 ENCOUNTER — HOSPITAL ENCOUNTER (OUTPATIENT)
Dept: VASCULAR LAB | Age: 65
Discharge: HOME | End: 2025-04-04
Attending: SURGERY
Payer: COMMERCIAL

## 2025-04-04 ENCOUNTER — OFFICE VISIT (OUTPATIENT)
Dept: BARIATRICS/WEIGHT MGMT | Age: 65
End: 2025-04-04

## 2025-04-04 VITALS
BODY MASS INDEX: 41.35 KG/M2 | HEART RATE: 69 BPM | DIASTOLIC BLOOD PRESSURE: 75 MMHG | SYSTOLIC BLOOD PRESSURE: 135 MMHG | HEIGHT: 61 IN | OXYGEN SATURATION: 96 % | WEIGHT: 219 LBS | TEMPERATURE: 97.7 F

## 2025-04-04 DIAGNOSIS — Z90.49 S/P LAPAROSCOPIC CHOLECYSTECTOMY: Primary | ICD-10-CM

## 2025-04-04 DIAGNOSIS — R60.0 BILATERAL LOWER EXTREMITY EDEMA: ICD-10-CM

## 2025-04-04 LAB — ECHO BSA: 2.07 M2

## 2025-04-04 PROCEDURE — 93970 EXTREMITY STUDY: CPT

## 2025-04-04 PROCEDURE — 93970 EXTREMITY STUDY: CPT | Performed by: SURGERY

## 2025-04-04 PROCEDURE — 99024 POSTOP FOLLOW-UP VISIT: CPT | Performed by: SURGERY

## 2025-04-04 RX ORDER — CYCLOBENZAPRINE HCL 10 MG
10 TABLET ORAL 3 TIMES DAILY PRN
Qty: 21 TABLET | Refills: 0 | Status: SHIPPED | OUTPATIENT
Start: 2025-04-04 | End: 2025-04-14

## 2025-04-04 NOTE — PROGRESS NOTES
Bilateral lower extremity edema         S/P Laparoscopic Cholecystectomy:  Incisions are healing well without signs of infection  Advised to avoid pushing and pulling more than 15 lbs for the next 3 weeks  Diet and activity progression discussed  Patient may peel surgical glue as tolerated  Start Flexeril 10 mg TID  Doing well     Bilateral Lower Extremity Edema:  Vascular duplex lower extremities ordered   May take 1 dose of previously prescribed Lasix    I called and spoke with patient about superficial clot, Lovenox Rx sent. I explained this is not a DVT    Follow up: 1 month    Orders placed this encounter:   Orders Placed This Encounter   Procedures    Vascular duplex lower extremity venous bilateral       New Prescriptions:   Orders Placed This Encounter   Medications    cyclobenzaprine (FLEXERIL) 10 MG tablet     Sig: Take 1 tablet by mouth 3 times daily as needed for Muscle spasms     Dispense:  21 tablet     Refill:  0        Scribe Attestation:  Eder BLAIR (scribe) scribed for and in the presence of Dr. Cas Glaser DO.    Electronically signed by Cas Glaser DO on 4/4/2025 at 2:24 PM    Please note that this chart was generated using voice recognition Dragon dictation software.  Although every effort was made to ensure the accuracy of this automated transcription, some errors in transcription may have occurred.     Cas BLAIR DO DO, personally performed the services described in this documentation. All medical record entries made by the scribe were at my direction and in my presence. I have reviewed the chart and discharge instructions (if applicable) and agree that the record reflects my personal performance and is accurate and complete.    Electronically Signed: Cas Glaser DO. 04/09/25. 8:30 PM.

## 2025-04-05 ENCOUNTER — TELEPHONE (OUTPATIENT)
Dept: FAMILY MEDICINE CLINIC | Age: 65
End: 2025-04-05

## 2025-04-05 RX ORDER — ENOXAPARIN SODIUM 100 MG/ML
40 INJECTION SUBCUTANEOUS DAILY
Qty: 5.6 ML | Refills: 0 | Status: SHIPPED | OUTPATIENT
Start: 2025-04-05 | End: 2025-04-19

## 2025-04-07 NOTE — TELEPHONE ENCOUNTER
Patient called to say that she had a venous Doppler and the Vascular surgery advised Lovenox but no orderes were placed. Writer reviewed patients charts no Documentation of Lovenox seen. Reviewed the scan report . Spokre with the patient and advised to talk to the Vascular and ask if she needed Anticoagulation. Patient called back and reported she needed to take Lovenox for 2 weeks and the order is in place. Advised patient to call back with any concerns.

## 2025-04-14 RX ORDER — OMEPRAZOLE 20 MG/1
20 CAPSULE, DELAYED RELEASE ORAL
Qty: 60 CAPSULE | Refills: 0 | Status: SHIPPED | OUTPATIENT
Start: 2025-04-14

## 2025-04-14 NOTE — TELEPHONE ENCOUNTER
Last visit: 03/21/2025  Last Med refill: 02/04/2025  Does patient have enough medication for 72 hours: No:     Next Visit Date:  Future Appointments   Date Time Provider Department Center   4/18/2025 11:30 AM Patricia March RPH [facility] DEP   4/29/2025 10:00 AM Shaina Hamilton MD [facility] DEP   1/12/2026  8:30 AM Bernie Valenzuela PSYD Neuro Spec Neurology -       Health Maintenance   Topic Date Due    Shingles vaccine (1 of 2) Never done    Diabetic retinal exam  01/12/2017    Diabetic foot exam  12/01/2017    Respiratory Syncytial Virus (RSV) Pregnant or age 60 yrs+ (1 - Risk 60-74 years 1-dose series) Never done    Diabetic Alb to Cr ratio (uACR) test  09/22/2023    COVID-19 Vaccine (3 - 2024-25 season) 09/01/2024    Breast cancer screen  04/25/2025    Lipids  06/05/2025    A1C test (Diabetic or Prediabetic)  06/26/2025    Depression Monitoring  03/21/2026    GFR test (Diabetes, CKD 3-4, OR last GFR 15-59)  03/26/2026    DTaP/Tdap/Td vaccine (4 - Td or Tdap) 08/25/2032    Colorectal Cancer Screen  07/17/2034    Flu vaccine  Completed    Pneumococcal 50+ years Vaccine  Completed    Hepatitis C screen  Completed    HIV screen  Completed    Hepatitis A vaccine  Aged Out    Hepatitis B vaccine  Aged Out    Hib vaccine  Aged Out    Polio vaccine  Aged Out    Meningococcal (ACWY) vaccine  Aged Out    Meningococcal B vaccine  Aged Out    Depression Screen  Discontinued    Pneumococcal 0-49 years Vaccine  Discontinued       Hemoglobin A1C (%)   Date Value   03/26/2025 9.5 (H)   02/04/2025 9.4   08/06/2024 8.1             ( goal A1C is < 7)   No components found for: \"LABMICR\"  No components found for: \"LDLCHOLESTEROL\", \"LDLCALC\"    (goal LDL is <100)   AST (U/L)   Date Value   03/26/2025 58 (H)     ALT (U/L)   Date Value   03/26/2025 80 (H)     BUN (mg/dL)   Date Value   03/26/2025 19     BP Readings from Last 3 Encounters:   04/04/25 135/75   03/26/25 (!) 155/89   03/21/25 114/63          (goal

## 2025-04-18 ENCOUNTER — SCHEDULED TELEPHONE ENCOUNTER (OUTPATIENT)
Dept: FAMILY MEDICINE CLINIC | Age: 65
End: 2025-04-18

## 2025-04-18 DIAGNOSIS — N18.2 TYPE 2 DIABETES MELLITUS WITH STAGE 2 CHRONIC KIDNEY DISEASE, WITHOUT LONG-TERM CURRENT USE OF INSULIN (HCC): ICD-10-CM

## 2025-04-18 DIAGNOSIS — E11.22 TYPE 2 DIABETES MELLITUS WITH STAGE 2 CHRONIC KIDNEY DISEASE, WITHOUT LONG-TERM CURRENT USE OF INSULIN (HCC): ICD-10-CM

## 2025-04-18 DIAGNOSIS — E11.9 TYPE 2 DIABETES MELLITUS WITHOUT COMPLICATION, WITHOUT LONG-TERM CURRENT USE OF INSULIN: Primary | ICD-10-CM

## 2025-04-18 PROCEDURE — APPNB60 APP NON BILLABLE TIME 46-60 MINS

## 2025-04-18 RX ORDER — GLIPIZIDE 5 MG/1
5 TABLET ORAL
Qty: 30 TABLET | Refills: 1 | Status: SHIPPED | OUTPATIENT
Start: 2025-04-18

## 2025-04-18 RX ORDER — DULAGLUTIDE 1.5 MG/.5ML
1.5 INJECTION, SOLUTION SUBCUTANEOUS WEEKLY
Qty: 2 ML | Refills: 2 | Status: SHIPPED | OUTPATIENT
Start: 2025-04-18

## 2025-04-18 RX ORDER — CYCLOBENZAPRINE HCL 10 MG
10 TABLET ORAL 3 TIMES DAILY PRN
COMMUNITY

## 2025-04-18 NOTE — PROGRESS NOTES
Medication Management Service  [facility]  [phone]    Pharmacist Visit - Phone Consult  Referring Provider: Dr. Hamilton    Subjective/Objective   Kavya De Santiaog is a 65 y.o. female contacted by Medication Management Service for Diabetes Management under Collaborative Practice Agreement with A1c goal < 7.5%. Re-establishing care with PharmD after not seeing for some time.     Patient PMH includes T2DM, HLD, HTN, TIA, cerebral artery occlusion with cerebral infarction (HCC), cervical spondylosis, fall, generalized anxiety disorder, GERD (gastroesophageal reflux disease), history of swelling of feet, lumbar radiculopathy, meningitis, DIEGO (nonalcoholic steatohepatitis), obesity, osteoarthritis of left knee, recurrent major depressive disorder, and restless legs syndrome. Complications from DM Include Neuropathy and CKD.     Patient recently underwent gall bladder removal surgery (03/25/2025); she reports still feeling a lump above one of her incisions and that the skin on her stomach is still very painful (rated as 6.5/10). Denies any redness, swelling, fever, or chills. She also reports that after the surgery she ended up getting a \"superficial blood clot\" in her left leg; has been on Lovenox since this time - will be finishing up last couple doses in the next few days.    Additionally, patient reports that her Clifford sensors are now cheaper through her insurance and more affordable; no concerns currently. She reports that one sensor had fallen off early; did not call [employer] for replacement.    COMPREHENSIVE MEDICATION REVIEW  Patient currently prescribed the following medications:  Current Outpatient Medications   Medication Sig Dispense Refill    omeprazole (PRILOSEC) 20 MG delayed release capsule Take 1 capsule by mouth 2 times daily (before meals) 60 capsule 0    enoxaparin (LOVENOX) 40 MG/0.4ML Inject 0.4 mLs into the skin daily for 14 days 5.6 mL 0    busPIRone (BUSPAR) 10 MG tablet Take 1

## 2025-04-20 PROBLEM — Z01.818 PREOPERATIVE CLEARANCE: Status: RESOLVED | Noted: 2025-03-21 | Resolved: 2025-04-20

## 2025-04-23 ENCOUNTER — RESULTS FOLLOW-UP (OUTPATIENT)
Dept: BARIATRICS/WEIGHT MGMT | Age: 65
End: 2025-04-23

## 2025-04-25 DIAGNOSIS — I10 PRIMARY HYPERTENSION: ICD-10-CM

## 2025-04-28 RX ORDER — LISINOPRIL 20 MG/1
20 TABLET ORAL DAILY
Qty: 30 TABLET | Refills: 0 | Status: SHIPPED | OUTPATIENT
Start: 2025-04-28

## 2025-04-28 RX ORDER — NIFEDIPINE 30 MG
30 TABLET, EXTENDED RELEASE ORAL NIGHTLY
Qty: 30 TABLET | Refills: 0 | Status: SHIPPED | OUTPATIENT
Start: 2025-04-28

## 2025-04-28 NOTE — TELEPHONE ENCOUNTER
Last visit: 3/21/25  Last Med refill: 12/5/24  Does patient have enough medication for 72 hours: no    Next Visit Date:4/29/25  Future Appointments   Date Time Provider Department Center   4/29/2025 10:00 AM Shaina Hamilton MD [facility] DEP   6/6/2025 10:00 AM Patricia March [facility] DEP   1/12/2026  8:30 AM Bernie Valenzuela PSYD Neuro Spec Neurology -       Health Maintenance   Topic Date Due    Shingles vaccine (1 of 2) Never done    Diabetic retinal exam  01/12/2017    Diabetic foot exam  12/01/2017    Respiratory Syncytial Virus (RSV) Pregnant or age 60 yrs+ (1 - Risk 60-74 years 1-dose series) Never done    Diabetic Alb to Cr ratio (uACR) test  09/22/2023    COVID-19 Vaccine (3 - 2024-25 season) 09/01/2024    Breast cancer screen  04/25/2025    Lipids  06/05/2025    A1C test (Diabetic or Prediabetic)  06/26/2025    Depression Monitoring  03/21/2026    GFR test (Diabetes, CKD 3-4, OR last GFR 15-59)  03/26/2026    DTaP/Tdap/Td vaccine (4 - Td or Tdap) 08/25/2032    Colorectal Cancer Screen  07/17/2034    Flu vaccine  Completed    Pneumococcal 50+ years Vaccine  Completed    Hepatitis C screen  Completed    HIV screen  Completed    Hepatitis A vaccine  Aged Out    Hepatitis B vaccine  Aged Out    Hib vaccine  Aged Out    Polio vaccine  Aged Out    Meningococcal (ACWY) vaccine  Aged Out    Meningococcal B vaccine  Aged Out    Depression Screen  Discontinued    Pneumococcal 0-49 years Vaccine  Discontinued       Hemoglobin A1C (%)   Date Value   03/26/2025 9.5 (H)   02/04/2025 9.4   08/06/2024 8.1             ( goal A1C is < 7)   No components found for: \"LABMICR\"  No components found for: \"LDLCHOLESTEROL\", \"LDLCALC\"    (goal LDL is <100)   AST (U/L)   Date Value   03/26/2025 58 (H)     ALT (U/L)   Date Value   03/26/2025 80 (H)     BUN (mg/dL)   Date Value   03/26/2025 19     BP Readings from Last 3 Encounters:   04/04/25 135/75   03/26/25 (!) 155/89   03/21/25 114/63          (goal

## 2025-04-29 ENCOUNTER — OFFICE VISIT (OUTPATIENT)
Dept: FAMILY MEDICINE CLINIC | Age: 65
End: 2025-04-29
Payer: COMMERCIAL

## 2025-04-29 ENCOUNTER — HOSPITAL ENCOUNTER (OUTPATIENT)
Age: 65
Setting detail: SPECIMEN
Discharge: HOME | End: 2025-04-29

## 2025-04-29 ENCOUNTER — TELEPHONE (OUTPATIENT)
Dept: FAMILY MEDICINE CLINIC | Age: 65
End: 2025-04-29

## 2025-04-29 VITALS
DIASTOLIC BLOOD PRESSURE: 80 MMHG | SYSTOLIC BLOOD PRESSURE: 126 MMHG | WEIGHT: 214 LBS | HEIGHT: 61 IN | BODY MASS INDEX: 40.4 KG/M2

## 2025-04-29 DIAGNOSIS — D50.8 IRON DEFICIENCY ANEMIA SECONDARY TO INADEQUATE DIETARY IRON INTAKE: ICD-10-CM

## 2025-04-29 DIAGNOSIS — D50.8 IRON DEFICIENCY ANEMIA SECONDARY TO INADEQUATE DIETARY IRON INTAKE: Primary | ICD-10-CM

## 2025-04-29 DIAGNOSIS — E11.9 TYPE 2 DIABETES MELLITUS WITHOUT COMPLICATION, WITHOUT LONG-TERM CURRENT USE OF INSULIN (HCC): ICD-10-CM

## 2025-04-29 DIAGNOSIS — I10 PRIMARY HYPERTENSION: ICD-10-CM

## 2025-04-29 LAB
HCT VFR BLD AUTO: 33.6 % (ref 36.3–47.1)
HGB BLD-MCNC: 9.7 G/DL (ref 11.9–15.1)

## 2025-04-29 PROCEDURE — 3074F SYST BP LT 130 MM HG: CPT | Performed by: FAMILY MEDICINE

## 2025-04-29 PROCEDURE — 2022F DILAT RTA XM EVC RTNOPTHY: CPT | Performed by: FAMILY MEDICINE

## 2025-04-29 PROCEDURE — 3079F DIAST BP 80-89 MM HG: CPT | Performed by: FAMILY MEDICINE

## 2025-04-29 PROCEDURE — 99213 OFFICE O/P EST LOW 20 MIN: CPT | Performed by: FAMILY MEDICINE

## 2025-04-29 PROCEDURE — G8428 CUR MEDS NOT DOCUMENT: HCPCS | Performed by: FAMILY MEDICINE

## 2025-04-29 PROCEDURE — G8417 CALC BMI ABV UP PARAM F/U: HCPCS | Performed by: FAMILY MEDICINE

## 2025-04-29 PROCEDURE — 3017F COLORECTAL CA SCREEN DOC REV: CPT | Performed by: FAMILY MEDICINE

## 2025-04-29 PROCEDURE — 3046F HEMOGLOBIN A1C LEVEL >9.0%: CPT | Performed by: FAMILY MEDICINE

## 2025-04-29 PROCEDURE — 1036F TOBACCO NON-USER: CPT | Performed by: FAMILY MEDICINE

## 2025-04-29 RX ORDER — HYDRALAZINE HYDROCHLORIDE 50 MG/1
50 TABLET, FILM COATED ORAL EVERY 8 HOURS SCHEDULED
Qty: 90 TABLET | Refills: 3 | Status: SHIPPED | OUTPATIENT
Start: 2025-04-29

## 2025-04-29 ASSESSMENT — ENCOUNTER SYMPTOMS
APNEA: 0
CONSTIPATION: 0
SHORTNESS OF BREATH: 0
DIARRHEA: 0

## 2025-04-29 NOTE — PROGRESS NOTES
Visit Information    Have you changed or started any medications since your last visit including any over-the-counter medicines, vitamins, or herbal medicines? no   Have you stopped taking any of your medications? Is so, why? -  no  Are you having any side effects from any of your medications? - no    Have you seen any other physician or provider since your last visit?  yes - Bariatrics   Have you had any other diagnostic tests since your last visit?  yes - Labs, Vascular Duplex   Have you been seen in the emergency room and/or had an admission in a hospital since we last saw you?  yes - [facility]   Have you had your routine dental cleaning in the past 6 months?  no     Do you have an active MyChart account? If no, what is the barrier?  Yes    Patient Care Team:  Shaina Hamilton MD as PCP - General (Family Medicine)  Shaina Hamilton MD as PCP - Empaneled Provider  Lucas Huitron MD as Consulting Physician (Gastroenterology)  Haris Granados MD Escobar, Alexander, MD as Orthopedic Surgeon  Patricia March [facility] as Pharmacist (Pharmacist)  Joel Harris MD as Consulting Physician (Infectious Diseases)  Tenzin Sanchez RPH as Pharmacist (Pharmacy)    Medical History Review  Past Medical, Family, and Social History reviewed and does contribute to the patient presenting condition    Health Maintenance   Topic Date Due    Shingles vaccine (1 of 2) Never done    Diabetic retinal exam  01/12/2017    Diabetic foot exam  12/01/2017    Respiratory Syncytial Virus (RSV) Pregnant or age 60 yrs+ (1 - Risk 60-74 years 1-dose series) Never done    Diabetic Alb to Cr ratio (uACR) test  09/22/2023    COVID-19 Vaccine (3 - 2024-25 season) 09/01/2024    Breast cancer screen  04/25/2025    Lipids  06/05/2025    A1C test (Diabetic or Prediabetic)  06/26/2025    Depression Monitoring  03/21/2026    GFR test (Diabetes, CKD 3-4, OR last GFR 15-59)  03/26/2026    DTaP/Tdap/Td vaccine (4 - Td or Tdap) 08/25/2032    Colorectal Cancer

## 2025-04-29 NOTE — TELEPHONE ENCOUNTER
Copied from CRM #[id number]. Topic: MW Messaging - MW Patient Request  >> Apr 29, 2025  2:12 PM Debbie PITTS wrote:  Gene called requesting to send a general message to clinician.   Verified issue is NOT regarding a symptom(s) requiring routine or emergent triage. Verified another message template type and CRM does not apply.    Selected 'Wrap Up CRM' and created new Telephone Encounter after clicking 'Convert to Clinical Call'. Selected appropriate Reason for Call.  Sent Pt message template and routed as routine priority per Clinician KB page to appropriate clinician pool. Readback full message.   Patient wants to know if she could get a letter to be off of work the whole week due to covid she thought she could manage going back to work today but she does not feel better or strong enough to work yet. If please can fax it too, [phone]. Patient would like a copy of letter.

## 2025-04-29 NOTE — PATIENT INSTRUCTIONS
Thank you for letting us take care of you today. We hope all your questions were addressed. If a question was overlooked or something else comes to mind after you return home, please contact a member of your Care Team listed below.      Your Care Team at [facility] is Team #1  Shaina Hamilton M.D. (Faculty)  Cristobal Roe M.D. (Resident)  Kiesha Brown D.O. (Resident)  Benny Wei M.D. (Resident)  Art Edwards M.D. (Resident)  Clare Kelley, [facility]  Mack Marin, [facility]  Etta Marquez, [facility]  Nissa Ying, [facility]  Deisi Lua, [facility]  Sharyn Crump, [facility]  Fidel Reina (LJ) [place],   Patricia March [facility] (Clinical Pharmacist)     Office phone number: [phone]    If you need to get in right away due to illness, please be advised we have \"Same Day\" appointments available Monday-Friday. Please call us at [phone] option #3 to schedule your \"Same Day\" appointment.

## 2025-04-29 NOTE — PROGRESS NOTES
Subjective:    Kavya De Santiago is a 65 y.o. female with  has a past medical history of Cerebral artery occlusion with cerebral infarction (HCC), Cervical spondylosis, CKD (chronic kidney disease), COVID-19, Fall, Generalized anxiety disorder, GERD (gastroesophageal reflux disease), History of recent hospitalization, History of swelling of feet, Hyperlipidemia, Hypertension, Left hand weakness, Lumbar radiculopathy, Meningitis, DIEGO (nonalcoholic steatohepatitis), Obesity, Osteoarthritis of left knee, Recurrent major depressive disorder, Restless legs syndrome, Snores, TIA (transient ischemic attack), Type II or unspecified type diabetes mellitus without mention of complication, not stated as uncontrolled, Under care of team, Under care of team, Under care of team, Under care of team, Wears glasses, and Wellness examination.  DM- better controlled on Trulicity  HTN= controlled  Anemia-probably Iron deficiency  . Repeat H&H  Family History   Problem Relation Age of Onset    Heart Attack Mother     Diabetes Mother     Diabetes Father     Heart Attack Father        Presented tothe office today for:  No chief complaint on file.      HPI    Review of Systems   Constitutional:  Negative for activity change and appetite change.   Eyes:  Negative for visual disturbance.   Respiratory:  Negative for apnea and shortness of breath.    Cardiovascular:  Negative for chest pain, palpitations and leg swelling.   Gastrointestinal:  Negative for constipation and diarrhea.   Genitourinary:  Negative for genital sores.   Neurological:  Positive for light-headedness. Negative for headaches.   Psychiatric/Behavioral:  Negative for behavioral problems.        Objective:    /80 (BP Site: Left Upper Arm, Patient Position: Sitting, BP Cuff Size: Large Adult)   Ht 1.549 m (5' 1\")   Wt 97.1 kg (214 lb)   BMI 40.43 kg/m²    BP Readings from Last 3 Encounters:   04/29/25 126/80   04/04/25 135/75   03/26/25 (!) 155/89       Physical

## 2025-04-29 NOTE — TELEPHONE ENCOUNTER
Received blank FMLA for [employer]. Scanned into chart blank. ERNIE and FMLA Policy signed and scanned into chart.

## 2025-05-08 NOTE — TELEPHONE ENCOUNTER
Received completed FMLA for [employer]. Faxed back and scanned into chart with confirmation. Writer called patient to inform her.

## 2025-05-12 NOTE — TELEPHONE ENCOUNTER
Last visit: 4.29.25  Last Med refill: 4.14.25  Does patient have enough medication for 72 hours: Yes    Next Visit Date:  Future Appointments   Date Time Provider Department Center   6/6/2025 10:00 AM Patricia March RPH [facility] DEP   7/29/2025 10:00 AM Shaina Hamilton MD [facility] DEP   1/12/2026  8:30 AM Bernie Valenzuela PSYD Neuro Spec Neurology -       Health Maintenance   Topic Date Due    Shingles vaccine (1 of 2) Never done    Diabetic retinal exam  01/12/2017    Diabetic foot exam  12/01/2017    Respiratory Syncytial Virus (RSV) Pregnant or age 60 yrs+ (1 - Risk 60-74 years 1-dose series) Never done    Diabetic Alb to Cr ratio (uACR) test  09/22/2023    COVID-19 Vaccine (3 - 2024-25 season) 09/01/2024    Breast cancer screen  04/25/2025    Lipids  06/05/2025    A1C test (Diabetic or Prediabetic)  06/26/2025    Depression Monitoring  03/21/2026    GFR test (Diabetes, CKD 3-4, OR last GFR 15-59)  03/26/2026    DTaP/Tdap/Td vaccine (4 - Td or Tdap) 08/25/2032    Colorectal Cancer Screen  07/17/2034    Flu vaccine  Completed    Pneumococcal 50+ years Vaccine  Completed    Hepatitis C screen  Completed    HIV screen  Completed    Hepatitis A vaccine  Aged Out    Hepatitis B vaccine  Aged Out    Hib vaccine  Aged Out    Polio vaccine  Aged Out    Meningococcal (ACWY) vaccine  Aged Out    Meningococcal B vaccine  Aged Out    Depression Screen  Discontinued    Pneumococcal 0-49 years Vaccine  Discontinued       Hemoglobin A1C (%)   Date Value   03/26/2025 9.5 (H)   02/04/2025 9.4   08/06/2024 8.1             ( goal A1C is < 7)   No components found for: \"LABMICR\"  No components found for: \"LDLCHOLESTEROL\", \"LDLCALC\"    (goal LDL is <100)   AST (U/L)   Date Value   03/26/2025 58 (H)     ALT (U/L)   Date Value   03/26/2025 80 (H)     BUN (mg/dL)   Date Value   03/26/2025 19     BP Readings from Last 3 Encounters:   04/29/25 126/80   04/04/25 135/75   03/26/25 (!) 155/89          (goal 120/80)    All

## 2025-05-13 RX ORDER — OMEPRAZOLE 20 MG/1
CAPSULE, DELAYED RELEASE ORAL
Qty: 60 CAPSULE | Refills: 0 | Status: SHIPPED | OUTPATIENT
Start: 2025-05-13

## 2025-05-22 NOTE — TELEPHONE ENCOUNTER
Future Testing planned in [facility]  Lab Frequency Next Occurrence   CBC Once 07/07/2024   Comprehensive Metabolic Panel Once 07/07/2024   COLONOSCOPY (Diagnostic) Once 07/15/2024   MRI BRAIN WO CONTRAST Once 11/07/2024   EEG Once 11/07/2024   Albumin/Creatinine Ratio, Urine Once 04/18/2025               Patient Active Problem List:     Cervical spondylosis     Type 2 diabetes mellitus without complication, without long-term current use of insulin ([facility])     Hypertension     Abnormal auditory perception     Nasal polyps     Osteoarthritis of left knee     Osteoarthritis of right knee     DIEGO (nonalcoholic steatohepatitis)     Vitamin D deficiency     Iron deficiency anemia     Dyslipidemia     Trochanteric bursitis     Chronic left shoulder pain     Restless legs syndrome     Generalized anxiety disorder     Acute encephalopathy     Class 2 obesity in adult     Leg edema     Recurrent major depressive disorder     Dermatitis     Retrolisthesis of vertebrae     Intention tremor     Sciatica     Back pain with radiation     Left sciatic nerve pain     Lumbar disc disease with radiculopathy     Intractable back pain     Piriformis syndrome of left side     Ulnar neuropathy at elbow, left     S/P decompression of ulnar nerve at elbow     Cervical myelopathy with cervical radiculopathy ([facility])     Lumbar spondylosis     Right median nerve neuropathy     Sacroiliitis     Secondary diabetes mellitus with stage 2 chronic kidney disease (GFR 60-89) (HCC)     Benign hypertension with CKD (chronic kidney disease) stage III ([facility])     Severe obesity (BMI 35.0-39.9) with comorbidity (HCC)     Stenosis of lateral recess of lumbosacral spine     Type 2 diabetes mellitus with chronic kidney disease     Sacroiliac joint pain     Type 2 diabetes mellitus with hyperglycemia     Severe diarrhea     Pancolitis (HCC)     Fever     Acute respiratory failure (HCC)     Colitis     Haemophilus influenzae meningitis     Bacteremia     Sepsis due

## 2025-05-23 RX ORDER — HYDROCHLOROTHIAZIDE 12.5 MG/1
CAPSULE ORAL
Qty: 2 EACH | Refills: 3 | Status: SHIPPED | OUTPATIENT
Start: 2025-05-23

## 2025-05-29 DIAGNOSIS — I10 PRIMARY HYPERTENSION: ICD-10-CM

## 2025-05-30 RX ORDER — NIFEDIPINE 30 MG
30 TABLET, EXTENDED RELEASE ORAL NIGHTLY
Qty: 30 TABLET | Refills: 0 | Status: SHIPPED | OUTPATIENT
Start: 2025-05-30

## 2025-05-30 RX ORDER — LISINOPRIL 20 MG/1
20 TABLET ORAL DAILY
Qty: 30 TABLET | Refills: 0 | Status: SHIPPED | OUTPATIENT
Start: 2025-05-30

## 2025-05-30 NOTE — TELEPHONE ENCOUNTER
Date Value   03/26/2025 19     BP Readings from Last 3 Encounters:   04/29/25 126/80   04/04/25 135/75   03/26/25 (!) 155/89          (goal 120/80)    All Future Testing planned in [facility]  Lab Frequency Next Occurrence   CBC Once 07/07/2024   Comprehensive Metabolic Panel Once 07/07/2024   COLONOSCOPY (Diagnostic) Once 07/15/2024   MRI BRAIN WO CONTRAST Once 11/07/2024   EEG Once 11/07/2024   Albumin/Creatinine Ratio, Urine Once 04/18/2025               Patient Active Problem List:     Cervical spondylosis     Type 2 diabetes mellitus without complication, without long-term current use of insulin (HCC)     Hypertension     Abnormal auditory perception     Nasal polyps     Osteoarthritis of left knee     Osteoarthritis of right knee     DIEGO (nonalcoholic steatohepatitis)     Vitamin D deficiency     Iron deficiency anemia     Dyslipidemia     Trochanteric bursitis     Chronic left shoulder pain     Restless legs syndrome     Generalized anxiety disorder     Acute encephalopathy     Class 2 obesity in adult     Leg edema     Recurrent major depressive disorder     Dermatitis     Retrolisthesis of vertebrae     Intention tremor     Sciatica     Back pain with radiation     Left sciatic nerve pain     Lumbar disc disease with radiculopathy     Intractable back pain     Piriformis syndrome of left side     Ulnar neuropathy at elbow, left     S/P decompression of ulnar nerve at elbow     Cervical myelopathy with cervical radiculopathy (HCC)     Lumbar spondylosis     Right median nerve neuropathy     Sacroiliitis     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 obesity (BMI 35.0-39.9) with comorbidity (HCC)     Stenosis of lateral recess of lumbosacral spine     Type 2 diabetes mellitus with chronic kidney disease     Sacroiliac joint pain     Type 2 diabetes mellitus with hyperglycemia     Severe

## 2025-06-13 RX ORDER — OMEPRAZOLE 20 MG/1
CAPSULE, DELAYED RELEASE ORAL
Qty: 60 CAPSULE | Refills: 0 | Status: SHIPPED | OUTPATIENT
Start: 2025-06-13

## 2025-06-13 NOTE — TELEPHONE ENCOUNTER
Bacteremia     Sepsis due to Haemophilus influenzae with acute respiratory failure without septic shock (HCC)     Anemia     Elevated LFTs     Unresponsive state     Meningoencephalitis     History of TIA (transient ischemic attack)     Cerebrovascular accident (CVA) (HCC)     Encephalopathy     Mild malnutrition     Intractable headache     Hx of meningitis     Tricuspid valve disorder     Acute cough     Mastoiditis of left side     Acute non-recurrent sinusitis     Chronic cholecystitis     Chronic cholecystitis with calculus

## 2025-06-17 DIAGNOSIS — N18.2 TYPE 2 DIABETES MELLITUS WITH STAGE 2 CHRONIC KIDNEY DISEASE, WITHOUT LONG-TERM CURRENT USE OF INSULIN (HCC): ICD-10-CM

## 2025-06-17 DIAGNOSIS — E11.22 TYPE 2 DIABETES MELLITUS WITH STAGE 2 CHRONIC KIDNEY DISEASE, WITHOUT LONG-TERM CURRENT USE OF INSULIN (HCC): ICD-10-CM

## 2025-06-17 RX ORDER — GLIPIZIDE 5 MG/1
5 TABLET ORAL
Qty: 30 TABLET | Refills: 0 | OUTPATIENT
Start: 2025-06-17

## 2025-06-19 DIAGNOSIS — G56.01 CARPAL TUNNEL SYNDROME OF RIGHT WRIST: ICD-10-CM

## 2025-06-20 RX ORDER — ATORVASTATIN CALCIUM 40 MG/1
40 TABLET, FILM COATED ORAL NIGHTLY
Qty: 90 TABLET | Refills: 0 | Status: SHIPPED | OUTPATIENT
Start: 2025-06-20

## 2025-06-25 DIAGNOSIS — I10 PRIMARY HYPERTENSION: ICD-10-CM

## 2025-06-25 DIAGNOSIS — F33.9 RECURRENT MAJOR DEPRESSIVE DISORDER, REMISSION STATUS UNSPECIFIED: ICD-10-CM

## 2025-06-25 RX ORDER — NIFEDIPINE 30 MG
30 TABLET, EXTENDED RELEASE ORAL NIGHTLY
Qty: 30 TABLET | Refills: 0 | Status: SHIPPED | OUTPATIENT
Start: 2025-06-25

## 2025-06-25 RX ORDER — LISINOPRIL 20 MG/1
20 TABLET ORAL DAILY
Qty: 30 TABLET | Refills: 0 | Status: SHIPPED | OUTPATIENT
Start: 2025-06-25

## 2025-06-25 RX ORDER — BUSPIRONE HYDROCHLORIDE 10 MG/1
10 TABLET ORAL 3 TIMES DAILY
Qty: 270 TABLET | Refills: 0 | Status: SHIPPED | OUTPATIENT
Start: 2025-06-25 | End: 2025-09-23

## 2025-06-25 NOTE — TELEPHONE ENCOUNTER
Last visit: 04/29/25  Last Med refill: 03/31/25  Does patient have enough medication for 72 hours: No:     Next Visit Date:  Future Appointments   Date Time Provider Department Center   7/22/2025  3:00 PM Patricia March RPH [facility] DEP   7/29/2025 10:00 AM Shaina Hamilton MD [facility] DEP   1/12/2026  8:30 AM Bernie Valenzuela PSYD Neuro Spec Neurology -       Health Maintenance   Topic Date Due    Shingles vaccine (1 of 2) Never done    Diabetic retinal exam  01/12/2017    Diabetic foot exam  12/01/2017    Respiratory Syncytial Virus (RSV) Pregnant or age 60 yrs+ (1 - Risk 60-74 years 1-dose series) Never done    Diabetic Alb to Cr ratio (uACR) test  09/22/2023    COVID-19 Vaccine (3 - 2024-25 season) 09/01/2024    Breast cancer screen  04/25/2025    Lipids  06/05/2025    A1C test (Diabetic or Prediabetic)  06/26/2025    Depression Monitoring  03/21/2026    GFR test (Diabetes, CKD 3-4, OR last GFR 15-59)  03/26/2026    DTaP/Tdap/Td vaccine (4 - Td or Tdap) 08/25/2032    Colorectal Cancer Screen  07/17/2034    Flu vaccine  Completed    Pneumococcal 50+ years Vaccine  Completed    Hepatitis C screen  Completed    HIV screen  Completed    Hepatitis A vaccine  Aged Out    Hepatitis B vaccine  Aged Out    Hib vaccine  Aged Out    Polio vaccine  Aged Out    Meningococcal (ACWY) vaccine  Aged Out    Meningococcal B vaccine  Aged Out    Depression Screen  Discontinued    Pneumococcal 0-49 years Vaccine  Discontinued       Hemoglobin A1C (%)   Date Value   03/26/2025 9.5 (H)   02/04/2025 9.4   08/06/2024 8.1             ( goal A1C is < 7)   No components found for: \"LABMICR\"  No components found for: \"LDLCHOLESTEROL\", \"LDLCALC\"    (goal LDL is <100)   AST (U/L)   Date Value   03/26/2025 58 (H)     ALT (U/L)   Date Value   03/26/2025 80 (H)     BUN (mg/dL)   Date Value   03/26/2025 19     BP Readings from Last 3 Encounters:   04/29/25 126/80   04/04/25 135/75   03/26/25 (!) 155/89          (goal

## 2025-06-27 DIAGNOSIS — N18.2 TYPE 2 DIABETES MELLITUS WITH STAGE 2 CHRONIC KIDNEY DISEASE, WITHOUT LONG-TERM CURRENT USE OF INSULIN (HCC): ICD-10-CM

## 2025-06-27 DIAGNOSIS — E11.22 TYPE 2 DIABETES MELLITUS WITH STAGE 2 CHRONIC KIDNEY DISEASE, WITHOUT LONG-TERM CURRENT USE OF INSULIN (HCC): ICD-10-CM

## 2025-06-30 RX ORDER — GLIPIZIDE 5 MG/1
5 TABLET ORAL
Qty: 30 TABLET | Refills: 0 | OUTPATIENT
Start: 2025-06-30

## 2025-07-02 DIAGNOSIS — E11.22 TYPE 2 DIABETES MELLITUS WITH STAGE 2 CHRONIC KIDNEY DISEASE, WITHOUT LONG-TERM CURRENT USE OF INSULIN (HCC): ICD-10-CM

## 2025-07-02 DIAGNOSIS — N18.2 TYPE 2 DIABETES MELLITUS WITH STAGE 2 CHRONIC KIDNEY DISEASE, WITHOUT LONG-TERM CURRENT USE OF INSULIN (HCC): ICD-10-CM

## 2025-07-02 NOTE — TELEPHONE ENCOUNTER
Last visit: 04/29/25  Last Med refill: 04/18/25  Does patient have enough medication for 72 hours: No:     Next Visit Date:  Future Appointments   Date Time Provider Department Center   7/22/2025  3:00 PM Patricia March RPH [facility] DEP   7/29/2025 10:00 AM Shaina Hamilton MD [facility] DEP   1/12/2026  8:30 AM Bernie Valenzuela PSYD Neuro Spec Neurology -       Health Maintenance   Topic Date Due    Shingles vaccine (1 of 2) Never done    Diabetic retinal exam  01/12/2017    Diabetic foot exam  12/01/2017    Respiratory Syncytial Virus (RSV) Pregnant or age 60 yrs+ (1 - Risk 60-74 years 1-dose series) Never done    Diabetic Alb to Cr ratio (uACR) test  09/22/2023    COVID-19 Vaccine (3 - 2024-25 season) 09/01/2024    Breast cancer screen  04/25/2025    Lipids  06/05/2025    A1C test (Diabetic or Prediabetic)  06/26/2025    Flu vaccine (1) 08/01/2025    Depression Monitoring  03/21/2026    GFR test (Diabetes, CKD 3-4, OR last GFR 15-59)  03/26/2026    DTaP/Tdap/Td vaccine (4 - Td or Tdap) 08/25/2032    Colorectal Cancer Screen  07/17/2034    Pneumococcal 50+ years Vaccine  Completed    Hepatitis C screen  Completed    HIV screen  Completed    Hepatitis A vaccine  Aged Out    Hepatitis B vaccine  Aged Out    Hib vaccine  Aged Out    Polio vaccine  Aged Out    Meningococcal (ACWY) vaccine  Aged Out    Meningococcal B vaccine  Aged Out    Depression Screen  Discontinued    Pneumococcal 0-49 years Vaccine  Discontinued       Hemoglobin A1C (%)   Date Value   03/26/2025 9.5 (H)   02/04/2025 9.4   08/06/2024 8.1             ( goal A1C is < 7)   No components found for: \"LABMICR\"  No components found for: \"LDLCHOLESTEROL\", \"LDLCALC\"    (goal LDL is <100)   AST (U/L)   Date Value   03/26/2025 58 (H)     ALT (U/L)   Date Value   03/26/2025 80 (H)     BUN (mg/dL)   Date Value   03/26/2025 19     BP Readings from Last 3 Encounters:   04/29/25 126/80   04/04/25 135/75   03/26/25 (!) 155/89          (goal

## 2025-07-03 RX ORDER — GLIPIZIDE 5 MG/1
5 TABLET ORAL
Qty: 30 TABLET | Refills: 0 | Status: SHIPPED | OUTPATIENT
Start: 2025-07-03

## 2025-07-07 RX ORDER — OMEPRAZOLE 20 MG/1
CAPSULE, DELAYED RELEASE ORAL
Qty: 60 CAPSULE | Refills: 0 | Status: SHIPPED | OUTPATIENT
Start: 2025-07-07

## 2025-07-07 NOTE — TELEPHONE ENCOUNTER
Last visit: 04/29/2025  Last Med refill: 06/13/2025  Does patient have enough medication for 72 hours: No:     Next Visit Date:  Future Appointments   Date Time Provider Department Center   7/22/2025  3:00 PM Patricia March RPH [facility] DEP   7/29/2025 10:00 AM Shaina Hamilton MD [facility] DEP   1/12/2026  8:30 AM Bernie Valenzuela PSYD Neuro Spec Neurology -       Health Maintenance   Topic Date Due    Shingles vaccine (1 of 2) Never done    Diabetic retinal exam  01/12/2017    Diabetic foot exam  12/01/2017    Respiratory Syncytial Virus (RSV) Pregnant or age 60 yrs+ (1 - Risk 60-74 years 1-dose series) Never done    Diabetic Alb to Cr ratio (uACR) test  09/22/2023    COVID-19 Vaccine (3 - 2024-25 season) 09/01/2024    Breast cancer screen  04/25/2025    Lipids  06/05/2025    A1C test (Diabetic or Prediabetic)  06/26/2025    Flu vaccine (1) 08/01/2025    Depression Monitoring  03/21/2026    GFR test (Diabetes, CKD 3-4, OR last GFR 15-59)  03/26/2026    DTaP/Tdap/Td vaccine (4 - Td or Tdap) 08/25/2032    Colorectal Cancer Screen  07/17/2034    Pneumococcal 50+ years Vaccine  Completed    Hepatitis C screen  Completed    HIV screen  Completed    Hepatitis A vaccine  Aged Out    Hepatitis B vaccine  Aged Out    Hib vaccine  Aged Out    Polio vaccine  Aged Out    Meningococcal (ACWY) vaccine  Aged Out    Meningococcal B vaccine  Aged Out    Depression Screen  Discontinued    Pneumococcal 0-49 years Vaccine  Discontinued       Hemoglobin A1C (%)   Date Value   03/26/2025 9.5 (H)   02/04/2025 9.4   08/06/2024 8.1             ( goal A1C is < 7)   No components found for: \"LABMICR\"  No components found for: \"LDLCHOLESTEROL\", \"LDLCALC\"    (goal LDL is <100)   AST (U/L)   Date Value   03/26/2025 58 (H)     ALT (U/L)   Date Value   03/26/2025 80 (H)     BUN (mg/dL)   Date Value   03/26/2025 19     BP Readings from Last 3 Encounters:   04/29/25 126/80   04/04/25 135/75   03/26/25 (!) 155/89          (goal

## 2025-07-17 DIAGNOSIS — E11.65 TYPE 2 DIABETES MELLITUS WITH HYPERGLYCEMIA, WITHOUT LONG-TERM CURRENT USE OF INSULIN (HCC): Primary | ICD-10-CM

## 2025-07-17 NOTE — TELEPHONE ENCOUNTER
Last visit: 04/29/25  Last Med refill: 05/23/25  Does patient have enough medication for 72 hours: No:     Next Visit Date:  Future Appointments   Date Time Provider Department Center   7/22/2025  3:00 PM Patricia March RPH [facility]   7/29/2025 10:00 AM Shaina Hamilton MD [facility]   1/12/2026  8:30 AM Bernie Valenzuela PSYD [facility] -       Health Maintenance   Topic Date Due    Shingles vaccine (1 of 2) Never done    Diabetic retinal exam  01/12/2017    Diabetic foot exam  12/01/2017    Respiratory Syncytial Virus (RSV) Pregnant or age 60 yrs+ (1 - Risk 60-74 years 1-dose series) Never done    Diabetic Alb to Cr ratio (uACR) test  09/22/2023    COVID-19 Vaccine (3 - 2024-25 season) 09/01/2024    Breast cancer screen  04/25/2025    Lipids  06/05/2025    A1C test (Diabetic or Prediabetic)  06/26/2025    Flu vaccine (1) 08/01/2025    Depression Monitoring  03/21/2026    GFR test (Diabetes, CKD 3-4, OR last GFR 15-59)  03/26/2026    DTaP/Tdap/Td vaccine (4 - Td or Tdap) 08/25/2032    Colorectal Cancer Screen  07/17/2034    Pneumococcal 50+ years Vaccine  Completed    Hepatitis C screen  Completed    HIV screen  Completed    Hepatitis A vaccine  Aged Out    Hepatitis B vaccine  Aged Out    Hib vaccine  Aged Out    Polio vaccine  Aged Out    Meningococcal (ACWY) vaccine  Aged Out    Meningococcal B vaccine  Aged Out    Depression Screen  Discontinued    Pneumococcal 0-49 years Vaccine  Discontinued       Hemoglobin A1C (%)   Date Value   03/26/2025 9.5 (H)   02/04/2025 9.4   08/06/2024 8.1             ( goal A1C is < 7)   No components found for: \"LABMICR\"  No components found for: \"LDLCHOLESTEROL\", \"LDLCALC\"    (goal LDL is <100)   AST (U/L)   Date Value   03/26/2025 58 (H)     ALT (U/L)   Date Value   03/26/2025 80 (H)     BUN (mg/dL)   Date Value   03/26/2025 19     BP Readings from Last 3 Encounters:   04/29/25 126/80   04/04/25 135/75   03/26/25 (!) 155/89          (goal

## 2025-07-19 DIAGNOSIS — E11.9 TYPE 2 DIABETES MELLITUS WITHOUT COMPLICATION, WITHOUT LONG-TERM CURRENT USE OF INSULIN (HCC): ICD-10-CM

## 2025-07-21 DIAGNOSIS — E11.9 TYPE 2 DIABETES MELLITUS WITHOUT COMPLICATION, WITHOUT LONG-TERM CURRENT USE OF INSULIN (HCC): ICD-10-CM

## 2025-07-21 RX ORDER — HYDROCHLOROTHIAZIDE 12.5 MG/1
CAPSULE ORAL
Qty: 1 EACH | Refills: 3 | Status: SHIPPED | OUTPATIENT
Start: 2025-07-21 | End: 2025-07-22 | Stop reason: SDUPTHER

## 2025-07-21 RX ORDER — DULAGLUTIDE 1.5 MG/.5ML
1.5 INJECTION, SOLUTION SUBCUTANEOUS WEEKLY
Qty: 2 ML | Refills: 0 | OUTPATIENT
Start: 2025-07-21

## 2025-07-21 NOTE — TELEPHONE ENCOUNTER
Last visit: 4/29/25  Last Med refill: 4/30/24  Does patient have enough medication for 72 hours: no  7/29/25  Next Visit Date:  Future Appointments   Date Time Provider Department Center   7/22/2025  3:00 PM Patricia March RPH [facility] DEP   7/29/2025 10:00 AM Shaina Hamilton MD [facility] DEP   1/12/2026  8:30 AM Bernie Valenzuela PSYD [facility] -       Health Maintenance   Topic Date Due    Shingles vaccine (1 of 2) Never done    Diabetic retinal exam  01/12/2017    Diabetic foot exam  12/01/2017    Respiratory Syncytial Virus (RSV) Pregnant or age 60 yrs+ (1 - Risk 60-74 years 1-dose series) Never done    Diabetic Alb to Cr ratio (uACR) test  09/22/2023    COVID-19 Vaccine (3 - 2024-25 season) 09/01/2024    Breast cancer screen  04/25/2025    Lipids  06/05/2025    A1C test (Diabetic or Prediabetic)  06/26/2025    Flu vaccine (1) 08/01/2025    Depression Monitoring  03/21/2026    GFR test (Diabetes, CKD 3-4, OR last GFR 15-59)  03/26/2026    DTaP/Tdap/Td vaccine (4 - Td or Tdap) 08/25/2032    Colorectal Cancer Screen  07/17/2034    Pneumococcal 50+ years Vaccine  Completed    Hepatitis C screen  Completed    HIV screen  Completed    Hepatitis A vaccine  Aged Out    Hepatitis B vaccine  Aged Out    Hib vaccine  Aged Out    Polio vaccine  Aged Out    Meningococcal (ACWY) vaccine  Aged Out    Meningococcal B vaccine  Aged Out    Depression Screen  Discontinued    Pneumococcal 0-49 years Vaccine  Discontinued       Hemoglobin A1C (%)   Date Value   03/26/2025 9.5 (H)   02/04/2025 9.4   08/06/2024 8.1             ( goal A1C is < 7)   No components found for: \"LABMICR\"  No components found for: \"LDLCHOLESTEROL\", \"LDLCALC\"    (goal LDL is <100)   AST (U/L)   Date Value   03/26/2025 58 (H)     ALT (U/L)   Date Value   03/26/2025 80 (H)     BUN (mg/dL)   Date Value   03/26/2025 19     BP Readings from Last 3 Encounters:   04/29/25 126/80   04/04/25 135/75   03/26/25 (!) 155/89          (goal

## 2025-07-22 ENCOUNTER — SCHEDULED TELEPHONE ENCOUNTER (OUTPATIENT)
Age: 65
End: 2025-07-22

## 2025-07-22 DIAGNOSIS — E11.65 TYPE 2 DIABETES MELLITUS WITH HYPERGLYCEMIA, WITHOUT LONG-TERM CURRENT USE OF INSULIN (HCC): ICD-10-CM

## 2025-07-22 PROCEDURE — APPNB60 APP NON BILLABLE TIME 46-60 MINS: Performed by: PHARMACIST

## 2025-07-22 RX ORDER — VENLAFAXINE HYDROCHLORIDE 75 MG/1
CAPSULE, EXTENDED RELEASE ORAL DAILY
Qty: 90 CAPSULE | Refills: 0 | Status: SHIPPED | OUTPATIENT
Start: 2025-07-22

## 2025-07-22 RX ORDER — DULAGLUTIDE 1.5 MG/.5ML
1.5 INJECTION, SOLUTION SUBCUTANEOUS WEEKLY
Qty: 2 ML | Refills: 2 | Status: SHIPPED | OUTPATIENT
Start: 2025-07-22 | End: 2025-07-22 | Stop reason: DRUGHIGH

## 2025-07-22 RX ORDER — HYDROCHLOROTHIAZIDE 12.5 MG/1
CAPSULE ORAL
Qty: 2 EACH | Refills: 3 | Status: SHIPPED | OUTPATIENT
Start: 2025-07-22

## 2025-07-22 NOTE — PROGRESS NOTES
chips   says not really eating.  A couple of times per week for ice cream.  Not noticing big increase in readings after eating ice cream.      Physical Activity: Not much physical activity, gonna go walking today.  Piriformus muscles achy/hurting-not able to walk long distances.    Sleep: Has trouble going to sleep/staying asleep due to pain. Reports not sleeping much due to restless leg syndrome.      WEIGHT/BODY MASS INDEX  Estimated body mass index is 40.68 kg/m² as calculated from the following:    Height as of 7/29/25: 1.549 m (5' 0.98\").    Weight as of 7/29/25: 97.6 kg (215 lb 3.2 oz).    DIABETES MANAGEMENT  Current DM Medications:   Metformin 1000 mg twice daily with meals  Trulicity 1.5 mg weekly (Saturday)  Glipizide 5 mg daily in the AM before breakfast    Previous Diabetes Medications (Why stopped): Synjardy 5-1000 mg, Trulicity 4.5 mg (cost issue), Humalog    Medication Adherence/Cost/Adverse Events: Reports tolerating the Trulicity well.      Blood Glucose Monitoring System: FreeStyle Clifford 3 Plus w/ phone ignacia  Testing Supply Fulfillment: [facility]    Previous Home Glucose Readings:    Dates:  04/05/2025-04/18/2025  Average Glucose:  165 mg/dL  GMI: 7.3%  Time sensor active: 85%  Time very high: 3%  Time high: 31%  Time in range: 66%  Time low: 0%  Time very low:  0%  Glucose Variabiltiy/Coefficient of Variation: 24.7%     Current Home Glucose Readings:     Hypoglycemia:   Classification of Hypoglycemia Events  []   None Reported  [x]   Level 1   (Glucose less than 70 mg/dL and greater than or equal to 54mg/dL)  []   Level 2   (Glucose less than 54mg/dL)  []   Level 3   (A severe event characterized by altered mental and/or physical status requiring assistance for treatment of hypoglycemia)    Diabetes Goals: Using ADA Standards of Care   Goal A1c:  Less than 7.5%   Fasting Blood Sugars:  80-130mg/dL  Postprandial glucose:  Less than 180mg/dL     A1C MONITORING  Lab Results   Component

## 2025-07-23 DIAGNOSIS — I10 PRIMARY HYPERTENSION: ICD-10-CM

## 2025-07-23 NOTE — TELEPHONE ENCOUNTER
Last visit: 04/29/2025  Last Med refill: 06/25/2025  Does patient have enough medication for 72 hours: No    Next Visit Date:  Future Appointments   Date Time Provider Department Center   7/29/2025 10:00 AM Shaina Hamilton MD [facility] DE[facility]   9/9/2025  3:00 PM Patricia March [facility]   1/12/2026  8:30 AM Bernie Valenzuela PSYD [facility] -       Health Maintenance   Topic Date Due    Shingles vaccine (1 of 2) Never done    Diabetic retinal exam  01/12/2017    Diabetic foot exam  12/01/2017    Respiratory Syncytial Virus (RSV) Pregnant or age 60 yrs+ (1 - Risk 60-74 years 1-dose series) Never done    Diabetic Alb to Cr ratio (uACR) test  09/22/2023    COVID-19 Vaccine (3 - 2024-25 season) 09/01/2024    Breast cancer screen  04/25/2025    Lipids  06/05/2025    A1C test (Diabetic or Prediabetic)  06/26/2025    Flu vaccine (1) 08/01/2025    Depression Monitoring  03/21/2026    GFR test (Diabetes, CKD 3-4, OR last GFR 15-59)  03/26/2026    DTaP/Tdap/Td vaccine (4 - Td or Tdap) 08/25/2032    Colorectal Cancer Screen  07/17/2034    Pneumococcal 50+ years Vaccine  Completed    Hepatitis C screen  Completed    HIV screen  Completed    Hepatitis A vaccine  Aged Out    Hepatitis B vaccine  Aged Out    Hib vaccine  Aged Out    Polio vaccine  Aged Out    Meningococcal (ACWY) vaccine  Aged Out    Meningococcal B vaccine  Aged Out    Depression Screen  Discontinued    Pneumococcal 0-49 years Vaccine  Discontinued       Hemoglobin A1C (%)   Date Value   03/26/2025 9.5 (H)   02/04/2025 9.4   08/06/2024 8.1             ( goal A1C is < 7)   No components found for: \"LABMICR\"  No components found for: \"LDLCHOLESTEROL\", \"LDLCALC\"    (goal LDL is <100)   AST (U/L)   Date Value   03/26/2025 58 (H)     ALT (U/L)   Date Value   03/26/2025 80 (H)     BUN (mg/dL)   Date Value   03/26/2025 19     BP Readings from Last 3 Encounters:   04/29/25 126/80   04/04/25 135/75   03/26/25 (!) 155/89          (goal

## 2025-07-24 RX ORDER — NIFEDIPINE 30 MG
30 TABLET, EXTENDED RELEASE ORAL NIGHTLY
Qty: 30 TABLET | Refills: 0 | Status: SHIPPED | OUTPATIENT
Start: 2025-07-24

## 2025-07-24 RX ORDER — LISINOPRIL 20 MG/1
20 TABLET ORAL DAILY
Qty: 30 TABLET | Refills: 0 | Status: SHIPPED | OUTPATIENT
Start: 2025-07-24

## 2025-07-29 ENCOUNTER — HOSPITAL ENCOUNTER (OUTPATIENT)
Age: 65
Setting detail: SPECIMEN
Discharge: HOME | End: 2025-07-29

## 2025-07-29 ENCOUNTER — OFFICE VISIT (OUTPATIENT)
Age: 65
End: 2025-07-29
Payer: COMMERCIAL

## 2025-07-29 VITALS — BODY MASS INDEX: 40.63 KG/M2 | WEIGHT: 215.2 LBS | HEIGHT: 61 IN

## 2025-07-29 DIAGNOSIS — E11.22 TYPE 2 DIABETES MELLITUS WITH STAGE 1 CHRONIC KIDNEY DISEASE, WITH LONG-TERM CURRENT USE OF INSULIN (HCC): ICD-10-CM

## 2025-07-29 DIAGNOSIS — Z12.31 ENCOUNTER FOR SCREENING MAMMOGRAM FOR MALIGNANT NEOPLASM OF BREAST: Primary | ICD-10-CM

## 2025-07-29 DIAGNOSIS — Z79.4 TYPE 2 DIABETES MELLITUS WITH STAGE 1 CHRONIC KIDNEY DISEASE, WITH LONG-TERM CURRENT USE OF INSULIN (HCC): ICD-10-CM

## 2025-07-29 DIAGNOSIS — Z79.4 TYPE 2 DIABETES MELLITUS WITHOUT COMPLICATION, WITH LONG-TERM CURRENT USE OF INSULIN (HCC): ICD-10-CM

## 2025-07-29 DIAGNOSIS — E11.9 TYPE 2 DIABETES MELLITUS WITHOUT COMPLICATION, WITH LONG-TERM CURRENT USE OF INSULIN (HCC): ICD-10-CM

## 2025-07-29 DIAGNOSIS — N18.1 TYPE 2 DIABETES MELLITUS WITH STAGE 1 CHRONIC KIDNEY DISEASE, WITH LONG-TERM CURRENT USE OF INSULIN (HCC): ICD-10-CM

## 2025-07-29 PROBLEM — A41.3: Status: RESOLVED | Noted: 2024-06-01 | Resolved: 2025-07-29

## 2025-07-29 PROBLEM — K51.00 PANCOLITIS (HCC): Status: RESOLVED | Noted: 2024-05-27 | Resolved: 2025-07-29

## 2025-07-29 PROBLEM — J96.00: Status: RESOLVED | Noted: 2024-06-01 | Resolved: 2025-07-29

## 2025-07-29 PROBLEM — R65.20: Status: RESOLVED | Noted: 2024-06-01 | Resolved: 2025-07-29

## 2025-07-29 PROBLEM — J96.00 ACUTE RESPIRATORY FAILURE (HCC): Status: RESOLVED | Noted: 2024-05-27 | Resolved: 2025-07-29

## 2025-07-29 LAB
ALBUMIN SERPL-MCNC: 4.2 G/DL (ref 3.5–5.2)
ALBUMIN/GLOB SERPL: 1.7 {RATIO} (ref 1–2.5)
ALP SERPL-CCNC: 102 U/L (ref 35–104)
ALT SERPL-CCNC: 27 U/L (ref 10–35)
ANION GAP SERPL CALCULATED.3IONS-SCNC: 13 MMOL/L (ref 9–16)
AST SERPL-CCNC: 22 U/L (ref 10–35)
BILIRUB SERPL-MCNC: 0.2 MG/DL (ref 0–1.2)
BUN SERPL-MCNC: 16 MG/DL (ref 8–23)
CALCIUM SERPL-MCNC: 9.8 MG/DL (ref 8.6–10.4)
CHLORIDE SERPL-SCNC: 106 MMOL/L (ref 98–107)
CO2 SERPL-SCNC: 23 MMOL/L (ref 20–31)
CREAT SERPL-MCNC: 1.1 MG/DL (ref 0.6–0.9)
CREAT UR-MCNC: 141 MG/DL (ref 28–217)
GFR, ESTIMATED: 56 ML/MIN/1.73M2
GLUCOSE SERPL-MCNC: 173 MG/DL (ref 74–99)
HBA1C MFR BLD: 8 %
MICROALBUMIN UR-MCNC: 40 MG/L (ref 0–20)
MICROALBUMIN/CREAT UR-RTO: 28 MCG/MG CREAT (ref 0–25)
POTASSIUM SERPL-SCNC: 5.4 MMOL/L (ref 3.7–5.3)
PROT SERPL-MCNC: 6.7 G/DL (ref 6.6–8.7)
SODIUM SERPL-SCNC: 142 MMOL/L (ref 136–145)

## 2025-07-29 PROCEDURE — 2022F DILAT RTA XM EVC RTNOPTHY: CPT | Performed by: FAMILY MEDICINE

## 2025-07-29 PROCEDURE — 99213 OFFICE O/P EST LOW 20 MIN: CPT | Performed by: FAMILY MEDICINE

## 2025-07-29 PROCEDURE — 83036 HEMOGLOBIN GLYCOSYLATED A1C: CPT | Performed by: FAMILY MEDICINE

## 2025-07-29 PROCEDURE — 99212 OFFICE O/P EST SF 10 MIN: CPT | Performed by: FAMILY MEDICINE

## 2025-07-29 PROCEDURE — G8417 CALC BMI ABV UP PARAM F/U: HCPCS | Performed by: FAMILY MEDICINE

## 2025-07-29 PROCEDURE — G8427 DOCREV CUR MEDS BY ELIG CLIN: HCPCS | Performed by: FAMILY MEDICINE

## 2025-07-29 PROCEDURE — 3052F HG A1C>EQUAL 8.0%<EQUAL 9.0%: CPT | Performed by: FAMILY MEDICINE

## 2025-07-29 PROCEDURE — PBSHW POCT GLYCOSYLATED HEMOGLOBIN (HGB A1C): Performed by: FAMILY MEDICINE

## 2025-07-29 PROCEDURE — 3017F COLORECTAL CA SCREEN DOC REV: CPT | Performed by: FAMILY MEDICINE

## 2025-07-29 PROCEDURE — 1036F TOBACCO NON-USER: CPT | Performed by: FAMILY MEDICINE

## 2025-07-31 DIAGNOSIS — R80.9 MICROALBUMINURIA: Primary | ICD-10-CM

## 2025-07-31 DIAGNOSIS — I10 PRIMARY HYPERTENSION: ICD-10-CM

## 2025-07-31 RX ORDER — LISINOPRIL 20 MG/1
30 TABLET ORAL DAILY
Qty: 90 TABLET | Refills: 3 | Status: SHIPPED | OUTPATIENT
Start: 2025-07-31

## 2025-07-31 NOTE — PROGRESS NOTES
Spoke with Kavya schultz : Microalbuminuria- Lisinopril dose was increased to 30 mg . New script ordered. [place] labs before next appointment.

## 2025-08-04 RX ORDER — OMEPRAZOLE 20 MG/1
CAPSULE, DELAYED RELEASE ORAL
Qty: 60 CAPSULE | Refills: 0 | Status: SHIPPED | OUTPATIENT
Start: 2025-08-04

## 2025-08-17 DIAGNOSIS — G25.81 RESTLESS LEGS SYNDROME: ICD-10-CM

## 2025-08-18 DIAGNOSIS — E11.65 TYPE 2 DIABETES MELLITUS WITH HYPERGLYCEMIA, WITHOUT LONG-TERM CURRENT USE OF INSULIN (HCC): ICD-10-CM

## 2025-08-18 RX ORDER — DULAGLUTIDE 3 MG/.5ML
INJECTION, SOLUTION SUBCUTANEOUS
Qty: 2 ML | Refills: 0 | Status: SHIPPED | OUTPATIENT
Start: 2025-08-18

## 2025-08-20 RX ORDER — NIFEDIPINE 30 MG
30 TABLET, EXTENDED RELEASE ORAL NIGHTLY
Qty: 30 TABLET | Refills: 0 | Status: SHIPPED | OUTPATIENT
Start: 2025-08-20

## 2025-08-20 RX ORDER — ROPINIROLE 2 MG/1
4 TABLET, FILM COATED ORAL NIGHTLY
Qty: 180 TABLET | Refills: 1 | Status: SHIPPED | OUTPATIENT
Start: 2025-08-20

## 2025-09-02 ENCOUNTER — TELEPHONE (OUTPATIENT)
Age: 65
End: 2025-09-02

## 2025-09-04 RX ORDER — OMEPRAZOLE 20 MG/1
CAPSULE, DELAYED RELEASE ORAL
Qty: 60 CAPSULE | Refills: 0 | Status: SHIPPED | OUTPATIENT
Start: 2025-09-04

## (undated) DEVICE — GLOVE ORANGE PI 8   MSG9080

## (undated) DEVICE — NEEDLE HYPO 25GA L1.5IN BLU POLYPR HUB S STL REG BVL STR

## (undated) DEVICE — LIQUIBAND RAPID ADHESIVE 36/CS 0.8ML: Brand: MEDLINE

## (undated) DEVICE — GLOVE ORANGE PI 7   MSG9070

## (undated) DEVICE — TISSUE RETRIEVAL SYSTEM: Brand: INZII RETRIEVAL SYSTEM

## (undated) DEVICE — MARKER,SKIN,WI/RULER AND LABELS: Brand: MEDLINE

## (undated) DEVICE — Device

## (undated) DEVICE — GOWN,AURORA,NONREINFORCED,LARGE: Brand: MEDLINE

## (undated) DEVICE — GARMENT,MEDLINE,DVT,INT,CALF,MED, GEN2: Brand: MEDLINE

## (undated) DEVICE — APPLICATOR MEDICATED 10.5 CC SOLUTION HI LT ORNG CHLORAPREP

## (undated) DEVICE — GLOVE SURG SZ 65 THK91MIL LTX FREE SYN POLYISOPRENE

## (undated) DEVICE — SUTURE NONABSORBABLE MONOFILAMENT 3-0 PS-1 18 IN BLK ETHILON 1663H

## (undated) DEVICE — COVER LT HNDL BLU PLAS

## (undated) DEVICE — VESSEL SEALER EXTEND: Brand: ENDOWRIST

## (undated) DEVICE — TROCAR: Brand: KII FIOS FIRST ENTRY

## (undated) DEVICE — DRESSING BORDERED ADH GZ UNIV GEN USE 5IN 4IN AND 2 1/2IN

## (undated) DEVICE — SYRINGE,CONTROL,LL,FINGER,GRIP: Brand: MEDLINE INDUSTRIES, INC.

## (undated) DEVICE — GOWN,SIRUS,NONRNF,SETINSLV,XL,20/CS: Brand: MEDLINE

## (undated) DEVICE — KIT DETRGNT ENZYMATIC SOLUTION 100 ML SOAK SHLD 5 VI LG HUMD

## (undated) DEVICE — GAUZE,SPONGE,FLUFF,6"X6.75",STRL,5/TRAY: Brand: MEDLINE

## (undated) DEVICE — ELECTRODE PT RET AD L9FT HI MOIST COND ADH HYDRGEL CORDED

## (undated) DEVICE — GLOVE ORANGE PI 7 1/2   MSG9075

## (undated) DEVICE — HYPODERMIC SAFETY NEEDLE: Brand: MAGELLAN

## (undated) DEVICE — C-ARM: Brand: UNBRANDED

## (undated) DEVICE — NEEDLE SPINAL 22GA L3.5IN SPINOCAN

## (undated) DEVICE — DRAPE,REIN 53X77,STERILE: Brand: MEDLINE

## (undated) DEVICE — Device: Brand: DEFENDO VALVE AND CONNECTOR KIT

## (undated) DEVICE — TOWEL,OR,DSP,ST,NATURAL,DLX,4/PK,20PK/CS: Brand: MEDLINE

## (undated) DEVICE — 3M™ STERI-STRIP™ COMPOUND BENZOIN TINCTURE 40 BAGS/CARTON 4 CARTONS/CASE C1544: Brand: 3M™ STERI-STRIP™

## (undated) DEVICE — HAND LEAD

## (undated) DEVICE — SOLUTION IRRIG 1000ML STRL H2O USP PLAS POUR BTL

## (undated) DEVICE — BANDAGE ADH W0.75XL3IN NAT PLAS CURAD

## (undated) DEVICE — ARM DRAPE

## (undated) DEVICE — SWABSTICK MEDICATED 10% POVIDONE IOD PVP SGL ANTISEP SAT

## (undated) DEVICE — ADAPTER CLEANING PORPOISE CLEANING

## (undated) DEVICE — SHEET, T, LAPAROTOMY, STERILE: Brand: MEDLINE

## (undated) DEVICE — 1000 S-DRAPE TOWEL DRAPE 10/BX: Brand: STERI-DRAPE™

## (undated) DEVICE — TUBE SET WITH SMOKE EVACUATION

## (undated) DEVICE — SUTURE VCRL SZ 2-0 L18IN ABSRB VLT L26MM SH 1/2 CIR J775D

## (undated) DEVICE — TUBING, SUCTION, 1/4" X 20', STRAIGHT: Brand: MEDLINE INDUSTRIES, INC.

## (undated) DEVICE — CONNECTOR,TUBING,5-IN-1,NON-STERILE: Brand: MEDLINE INDUSTRIES, INC.

## (undated) DEVICE — 3M™ IOBAN™ 2 ANTIMICROBIAL INCISE DRAPE 6650EZ: Brand: IOBAN™ 2

## (undated) DEVICE — C-ARMOR C-ARM EQUIPMENT COVERS CLEAR STERILE UNIVERSAL FIT 12 PER CASE: Brand: C-ARMOR

## (undated) DEVICE — COVER,MAYO STAND,STERILE: Brand: MEDLINE

## (undated) DEVICE — FORCEPS BX L240CM JAW DIA2.4MM ORNG L CAP W/ NDL DISP RAD

## (undated) DEVICE — SYRINGE MED 30ML STD CLR PLAS LUERLOCK TIP N CTRL DISP

## (undated) DEVICE — SVMMC KYPHOPLASTY PK

## (undated) DEVICE — ADHESIVE SKIN CLOSURE TOP 36 CC HI VISC DERMBND MINI

## (undated) DEVICE — Device: Brand: JELCO

## (undated) DEVICE — SEAL

## (undated) DEVICE — CONTAINER,SPECIMEN,4OZ,OR STRL: Brand: MEDLINE

## (undated) DEVICE — SUCTION IRRIGATOR: Brand: ENDOWRIST

## (undated) DEVICE — STRIP,CLOSURE,WOUND,MEDI-STRIP,1/2X4: Brand: MEDLINE

## (undated) DEVICE — SYRINGE, LUER LOCK, 10ML: Brand: MEDLINE

## (undated) DEVICE — PERRYSBURG ENDO PACK: Brand: MEDLINE INDUSTRIES, INC.

## (undated) DEVICE — INTENDED FOR TISSUE SEPARATION, AND OTHER PROCEDURES THAT REQUIRE A SHARP SURGICAL BLADE TO PUNCTURE OR CUT.: Brand: BARD-PARKER ® CARBON RIB-BACK BLADES

## (undated) DEVICE — JACKSON / MODULAR SPINAL TABLE STYLE POSITIONING KIT - STANDARD: Brand: SOULE MEDICAL

## (undated) DEVICE — SUTURE VICRYL + SZ 0 L27IN ABSRB VLT L26MM UR-6 5/8 CIR VCP603H